# Patient Record
Sex: MALE | Race: WHITE | NOT HISPANIC OR LATINO | Employment: OTHER | URBAN - METROPOLITAN AREA
[De-identification: names, ages, dates, MRNs, and addresses within clinical notes are randomized per-mention and may not be internally consistent; named-entity substitution may affect disease eponyms.]

---

## 2017-03-30 DIAGNOSIS — K70.31 ALCOHOLIC CIRRHOSIS OF LIVER WITH ASCITES (H): Primary | ICD-10-CM

## 2017-04-10 ENCOUNTER — OFFICE VISIT (OUTPATIENT)
Dept: GASTROENTEROLOGY | Facility: CLINIC | Age: 52
End: 2017-04-10
Attending: INTERNAL MEDICINE
Payer: COMMERCIAL

## 2017-04-10 VITALS
TEMPERATURE: 98 F | HEART RATE: 89 BPM | HEIGHT: 70 IN | OXYGEN SATURATION: 100 % | SYSTOLIC BLOOD PRESSURE: 156 MMHG | WEIGHT: 171.8 LBS | BODY MASS INDEX: 24.6 KG/M2 | DIASTOLIC BLOOD PRESSURE: 91 MMHG

## 2017-04-10 DIAGNOSIS — K70.31 ALCOHOLIC CIRRHOSIS OF LIVER WITH ASCITES (H): ICD-10-CM

## 2017-04-10 DIAGNOSIS — K70.30 ALCOHOLIC CIRRHOSIS OF LIVER WITHOUT ASCITES (H): Primary | ICD-10-CM

## 2017-04-10 LAB
ALBUMIN SERPL-MCNC: 3.2 G/DL (ref 3.4–5)
ALP SERPL-CCNC: 245 U/L (ref 40–150)
ALT SERPL W P-5'-P-CCNC: 19 U/L (ref 0–70)
ANION GAP SERPL CALCULATED.3IONS-SCNC: 8 MMOL/L (ref 3–14)
AST SERPL W P-5'-P-CCNC: 48 U/L (ref 0–45)
BILIRUB DIRECT SERPL-MCNC: 0.9 MG/DL (ref 0–0.2)
BILIRUB SERPL-MCNC: 1.9 MG/DL (ref 0.2–1.3)
BUN SERPL-MCNC: 7 MG/DL (ref 7–30)
CALCIUM SERPL-MCNC: 9 MG/DL (ref 8.5–10.1)
CHLORIDE SERPL-SCNC: 109 MMOL/L (ref 94–109)
CO2 SERPL-SCNC: 23 MMOL/L (ref 20–32)
CREAT SERPL-MCNC: 0.92 MG/DL (ref 0.66–1.25)
ERYTHROCYTE [DISTWIDTH] IN BLOOD BY AUTOMATED COUNT: 15.9 % (ref 10–15)
GFR SERPL CREATININE-BSD FRML MDRD: 86 ML/MIN/1.7M2
GLUCOSE SERPL-MCNC: 116 MG/DL (ref 70–99)
HCT VFR BLD AUTO: 39.3 % (ref 40–53)
HGB BLD-MCNC: 13.2 G/DL (ref 13.3–17.7)
INR PPP: 1.4 (ref 0.86–1.14)
MCH RBC QN AUTO: 31.7 PG (ref 26.5–33)
MCHC RBC AUTO-ENTMCNC: 33.6 G/DL (ref 31.5–36.5)
MCV RBC AUTO: 95 FL (ref 78–100)
PLATELET # BLD AUTO: 66 10E9/L (ref 150–450)
POTASSIUM SERPL-SCNC: 3.5 MMOL/L (ref 3.4–5.3)
PROT SERPL-MCNC: 7.6 G/DL (ref 6.8–8.8)
RBC # BLD AUTO: 4.16 10E12/L (ref 4.4–5.9)
SODIUM SERPL-SCNC: 140 MMOL/L (ref 133–144)
WBC # BLD AUTO: 4.3 10E9/L (ref 4–11)

## 2017-04-10 PROCEDURE — 36415 COLL VENOUS BLD VENIPUNCTURE: CPT | Performed by: INTERNAL MEDICINE

## 2017-04-10 PROCEDURE — 99212 OFFICE O/P EST SF 10 MIN: CPT | Mod: ZF

## 2017-04-10 PROCEDURE — 80048 BASIC METABOLIC PNL TOTAL CA: CPT | Performed by: INTERNAL MEDICINE

## 2017-04-10 PROCEDURE — 85027 COMPLETE CBC AUTOMATED: CPT | Performed by: INTERNAL MEDICINE

## 2017-04-10 PROCEDURE — 80076 HEPATIC FUNCTION PANEL: CPT | Performed by: INTERNAL MEDICINE

## 2017-04-10 PROCEDURE — 85610 PROTHROMBIN TIME: CPT | Performed by: INTERNAL MEDICINE

## 2017-04-10 ASSESSMENT — PAIN SCALES - GENERAL: PAINLEVEL: NO PAIN (0)

## 2017-04-10 NOTE — NURSING NOTE
Chief Complaint   Patient presents with     RECHECK     Alcoholic Cirrhosis of Liver with ascites   Pt roomed, vitals, meds, and allergies reviewed with pt. Pt ready for provider.  Dalton Arriaga, CMA

## 2017-04-10 NOTE — MR AVS SNAPSHOT
After Visit Summary   4/10/2017    Ivan Bell    MRN: [id number]           Patient Information     Date Of Birth          1965        Visit Information        Provider Department      4/10/2017 9:00 AM Gonzalo Wahl MD [facility] Hepatology        Care Instructions    1.  Stop spironolactone/Aldactone  2.  Low salt diet  3.  Weight yourself every week- let us know if your weight increases or if you develop fluid retention in your legs  4.  No alcohol  5.  See me in the clinic in 6 months    Gonzalo Bales MD  Hepatology  [facility]        Follow-ups after your visit        Follow-up notes from your care team     Return in about 6 months (around 10/10/2017).      Your next 10 appointments already scheduled     Oct 09, 2017  7:00 AM CDT   Lab with  LAB   [facility])    [address] [place] [id number] [phone]            Oct 09, 2017  8:00 AM CDT   (Arrive by 7:45 AM)   Return General Liver with Gonzalo Miramontes MD   [facility] Hepatology ([facility])    [address] [place] [address] [phone]              Who to contact     If you have questions or need follow up information about today's clinic visit or your schedule please contact [facility] HEPATOLOGY directly at [phone].  Normal or non-critical lab and imaging results will be communicated to you by MyChart, letter or phone within 4 business days after the clinic has received the results. If you do not hear from us within 7 days, please contact the clinic through MyChart or phone. If you have a critical or abnormal lab result, we will notify you by phone as soon as possible.  Submit refill requests through [employer] or call your pharmacy and they will forward the refill request to us. Please allow 3 business days for your refill to be completed.          Additional Information About Your  "Visit        MyChart Information     [employer] gives you secure access to your electronic health record. If you see a primary care provider, you can also send messages to your care team and make appointments. If you have questions, please call your primary care clinic.  If you do not have a primary care provider, please call [phone] and they will assist you.        Care EveryWhere ID     This is your Care EveryWhere ID. This could be used by other organizations to access your [place] medical records  [id number]        Your Vitals Were     Pulse Temperature Height Pulse Oximetry BMI (Body Mass Index)       89 98  F (36.7  C) (Oral) 1.778 m (5' 10\") 100% 24.65 kg/m2        Blood Pressure from Last 3 Encounters:   04/10/17 (!) 156/91   10/03/16 140/70   10/03/16 135/79    Weight from Last 3 Encounters:   04/10/17 77.9 kg (171 lb 12.8 oz)   10/03/16 76.9 kg (169 lb 8 oz)   10/03/16 76.6 kg (168 lb 12.8 oz)              Today, you had the following     No orders found for display       Primary Care Provider Office Phone # Fax #    Rosette Wills [phone]       [place] [facility] [address] [place] [address]        Thank you!     Thank you for choosing [facility] HEPATOLOGY  for your care. Our goal is always to provide you with excellent care. Hearing back from our patients is one way we can continue to improve our services. Please take a few minutes to complete the written survey that you may receive in the mail after your visit with us. Thank you!             Your Updated Medication List - Protect others around you: Learn how to safely use, store and throw away your medicines at www.disposemymeds.org.          This list is accurate as of: 4/10/17  9:21 AM.  Always use your most recent med list.                   Brand Name Dispense Instructions for use    MULTIPLE VITAMINS PO      Take 1 tablet by mouth daily       sildenafil 20 MG tablet    REVATIO/VIAGRA    90 tablet    Take 1 " tablet (20 mg) by mouth 3 times daily Take 1-3 po daily prn       triamcinolone 0.1 % cream    KENALOG    80 g    Apply sparingly to affected area three times daily as needed for itching.       UNABLE TO FIND      MEDICATION NAME: P6

## 2017-04-10 NOTE — PATIENT INSTRUCTIONS
1.  Stop spironolactone/Aldactone  2.  Low salt diet  3.  Weight yourself every week- let us know if your weight increases or if you develop fluid retention in your legs  4.  No alcohol  5.  See me in the clinic in 6 months    Gonzalo Bales MD  Hepatology  [facility]

## 2017-04-10 NOTE — LETTER
"4/10/2017      RE: Ivan Bell  [address] [address] [address]       [facility]    Hepatology follow-up    Follow-up visit for cirrhosis    Subjective:  51 year old male    Cirrhosis  - ETOH, last ETOH 2/14/16  - hx ascites, HE  - no hx variceal bleed  - last EGD March 2016- small EV, mod portal hypertensive gastropathy  - HCC screening- abd U/S 4/10/2017 (P)    Patient presents to clinic this morning with his wife for follow-up of cirrhosis.  Last clinic visit was Oct 2016.  Patient denies ER visits, new medications or hospital admissions since his last clinic visit.    Patient is doing well.  His appetite has improved.  He feels well with improved energy.  He reports that his mood is good but his wife thinks that he is still martinez/irritable.  The clinic  and behavioral health clinician did reach out about counseling but the patient never returned their calls.  He is still concerned about erectile dysfunction.  Patient denies any symptoms specific to liver disease.    Patient denies jaundice, lower extremity edema, abdominal distension, lethargy or confusion.    Patient denies melena, hematemesis or hematochezia.    Patient denies fevers, sweats or chills.  Weight stable.    Patient reports that he \"occasionally\" has a beer.  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  He and his wife are still raising a [place] Short-hair puppy who is now 6 months old.      Medical hx Surgical hx   Past Medical History:   Diagnosis Date     Hypertension      Left calcaneus fracture 1/9/2006      Past Surgical History:   Procedure Laterality Date     ANKLE SURGERY Left      COLONOSCOPY N/A 3/31/2016    Procedure: COLONOSCOPY;  Surgeon: Rhys Uriostegui MD;  Location:  GI     ESOPHAGOSCOPY, GASTROSCOPY, DUODENOSCOPY (EGD), COMBINED N/A 3/31/2016    Procedure: COMBINED ESOPHAGOSCOPY, GASTROSCOPY, DUODENOSCOPY (EGD);  Surgeon: " "Rhys Uriostegui MD;  Location:  GI     KNEE SURGERY Left      KNEE SURGERY Right           Medications  Prior to Admission medications    Medication Sig Start Date End Date Taking? Authorizing Provider   UNABLE TO FIND MEDICATION NAME: P6   Yes Reported, Patient   sildenafil (REVATIO/VIAGRA) 20 MG tablet Take 1 tablet (20 mg) by mouth 3 times daily Take 1-3 po daily prn 10/3/16  Yes Rosette Wills MD   triamcinolone (KENALOG) 0.1 % cream Apply sparingly to affected area three times daily as needed for itching. 10/3/16  Yes Rosette Wills MD   spironolactone (ALDACTONE) 100 MG tablet TAKE ONE TABLET BY MOUTH EVERY DAY  Patient taking differently: TAKE ONE 1/2 TABLET BY MOUTH EVERY DAY 8/2/16  Yes Gonzalo Wahl MD   MULTIPLE VITAMINS PO Take 1 tablet by mouth daily   Yes Reported, Patient       Allergies  Allergies   Allergen Reactions     Benadryl [Diphenhydramine] Other (See Comments)     Delirium (visual and auditory hallucinations)       Review of systems  A 10-point review of systems was negative    Examination  BP (!) 156/91  Pulse 89  Temp 98  F (36.7  C) (Oral)  Ht 1.778 m (5' 10\")  Wt 77.9 kg (171 lb 12.8 oz)  SpO2 100%  BMI 24.65 kg/m2  Body mass index is 24.65 kg/(m^2).    Gen- well, NAD, A+Ox3, normal color  Lym- no palpable LAD  CVS- RRR  RS- CTA  Abd- soft, non-tender, no ascites or organomegaly on palpation or percussion, BS+  Extr-hands normal, no BESSY  Skin- no rash or jaundice  Neuro- no asterixis  Psych- normal mood    Laboratory  Lab Results   Component Value Date     04/10/2017    POTASSIUM 3.5 04/10/2017    CHLORIDE 109 04/10/2017    CO2 23 04/10/2017    BUN 7 04/10/2017    CR 0.92 04/10/2017       Lab Results   Component Value Date    BILITOTAL 1.9 04/10/2017    ALT 19 04/10/2017    AST 48 04/10/2017    ALKPHOS 245 04/10/2017       Lab Results   Component Value Date    ALBUMIN 3.2 04/10/2017    PROTTOTAL 7.6 04/10/2017        Lab Results   Component " Value Date    WBC 4.3 04/10/2017    HGB 13.2 04/10/2017    MCV 95 04/10/2017    PLT 66 04/10/2017       Lab Results   Component Value Date    INR 1.40 04/10/2017       Radiology  abd U/S (P)    Assessment  51 year old male who presents for routine follow-up of alcoholic cirrhosis complicated with history of ascites.  MELD-Na= 13.  Last ETOH= ?.  Ascites remains well-controlled on low dose diuretics.  Will discontinue diuretics.  We discussed the importance of complete abstinence from alcohol in patients with alcoholic cirrhosis in order to avoid worsening liver dysfunction which would ultimately require evaluation for liver transplant.  Up to date with surveillance of esophageal varices.  Up to date with HCC screening    Plan  1.  Follow-up pending U/S results  2.  Discontinue diuretics if no evidence of ascites on U/S  3.  Complete abstinence from alcohol  4.  Low Na diet  5.  Follow-up in 6 months    Gonzalo Bales MD  Hepatology  [facility]

## 2017-04-10 NOTE — PROGRESS NOTES
"[facility]    Hepatology follow-up    Follow-up visit for cirrhosis    Subjective:  51 year old male    Cirrhosis  - ETOH, last ETOH 2/14/16  - hx ascites, HE  - no hx variceal bleed  - last EGD March 2016- small EV, mod portal hypertensive gastropathy  - HCC screening- abd U/S 4/10/2017 (P)    Patient presents to clinic this morning with his wife for follow-up of cirrhosis.  Last clinic visit was Oct 2016.  Patient denies ER visits, new medications or hospital admissions since his last clinic visit.    Patient is doing well.  His appetite has improved.  He feels well with improved energy.  He reports that his mood is good but his wife thinks that he is still martinez/irritable.  The clinic  and behavioral health clinician did reach out about counseling but the patient never returned their calls.  He is still concerned about erectile dysfunction.  Patient denies any symptoms specific to liver disease.    Patient denies jaundice, lower extremity edema, abdominal distension, lethargy or confusion.    Patient denies melena, hematemesis or hematochezia.    Patient denies fevers, sweats or chills.  Weight stable.    Patient reports that he \"occasionally\" has a beer.  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      Medical hx Surgical hx   Past Medical History:   Diagnosis Date     Hypertension      Left calcaneus fracture 1/9/2006      Past Surgical History:   Procedure Laterality Date     ANKLE SURGERY Left      COLONOSCOPY N/A 3/31/2016    Procedure: COLONOSCOPY;  Surgeon: Rhys Uriostegui MD;  Location:  GI     ESOPHAGOSCOPY, GASTROSCOPY, DUODENOSCOPY (EGD), COMBINED N/A 3/31/2016    Procedure: COMBINED ESOPHAGOSCOPY, GASTROSCOPY, DUODENOSCOPY (EGD);  Surgeon: Rhys Uriostegui MD;  Location:  GI     KNEE SURGERY Left      KNEE " "SURGERY Right           Medications  Prior to Admission medications    Medication Sig Start Date End Date Taking? Authorizing Provider   UNABLE TO FIND MEDICATION NAME: P6   Yes Reported, Patient   sildenafil (REVATIO/VIAGRA) 20 MG tablet Take 1 tablet (20 mg) by mouth 3 times daily Take 1-3 po daily prn 10/3/16  Yes Rosette Wills MD   triamcinolone (KENALOG) 0.1 % cream Apply sparingly to affected area three times daily as needed for itching. 10/3/16  Yes Rosette Wills MD   spironolactone (ALDACTONE) 100 MG tablet TAKE ONE TABLET BY MOUTH EVERY DAY  Patient taking differently: TAKE ONE 1/2 TABLET BY MOUTH EVERY DAY 8/2/16  Yes Gonzalo Wahl MD   MULTIPLE VITAMINS PO Take 1 tablet by mouth daily   Yes Reported, Patient       Allergies  Allergies   Allergen Reactions     Benadryl [Diphenhydramine] Other (See Comments)     Delirium (visual and auditory hallucinations)       Review of systems  A 10-point review of systems was negative    Examination  BP (!) 156/91  Pulse 89  Temp 98  F (36.7  C) (Oral)  Ht 1.778 m (5' 10\")  Wt 77.9 kg (171 lb 12.8 oz)  SpO2 100%  BMI 24.65 kg/m2  Body mass index is 24.65 kg/(m^2).    Gen- well, NAD, A+Ox3, normal color  Lym- no palpable LAD  CVS- RRR  RS- CTA  Abd- soft, non-tender, no ascites or organomegaly on palpation or percussion, BS+  Extr-hands normal, no BESSY  Skin- no rash or jaundice  Neuro- no asterixis  Psych- normal mood    Laboratory  Lab Results   Component Value Date     04/10/2017    POTASSIUM 3.5 04/10/2017    CHLORIDE 109 04/10/2017    CO2 23 04/10/2017    BUN 7 04/10/2017    CR 0.92 04/10/2017       Lab Results   Component Value Date    BILITOTAL 1.9 04/10/2017    ALT 19 04/10/2017    AST 48 04/10/2017    ALKPHOS 245 04/10/2017       Lab Results   Component Value Date    ALBUMIN 3.2 04/10/2017    PROTTOTAL 7.6 04/10/2017        Lab Results   Component Value Date    WBC 4.3 04/10/2017    HGB 13.2 04/10/2017    MCV 95 04/10/2017    PLT " 66 04/10/2017       Lab Results   Component Value Date    INR 1.40 04/10/2017       Radiology  abd U/S (P)    Assessment  51 year old male who presents for routine follow-up of alcoholic cirrhosis complicated with history of ascites.  MELD-Na= 13.  Last ETOH= ?.  Ascites remains well-controlled on low dose diuretics.  Will discontinue diuretics.  We discussed the importance of complete abstinence from alcohol in patients with alcoholic cirrhosis in order to avoid worsening liver dysfunction which would ultimately require evaluation for liver transplant.  Up to date with surveillance of esophageal varices.  Up to date with HCC screening    Plan  1.  Follow-up pending U/S results  2.  Discontinue diuretics if no evidence of ascites on U/S  3.  Complete abstinence from alcohol  4.  Low Na diet  5.  Follow-up in 6 months    Gonzalo Bales MD  Hepatology  [facility]

## 2017-04-11 DIAGNOSIS — N52.9 ERECTILE DYSFUNCTION, UNSPECIFIED ERECTILE DYSFUNCTION TYPE: ICD-10-CM

## 2017-04-11 RX ORDER — SILDENAFIL CITRATE 20 MG/1
20 TABLET ORAL 3 TIMES DAILY
Qty: 90 TABLET | Refills: 3 | Status: ON HOLD | OUTPATIENT
Start: 2017-04-11 | End: 2017-10-31

## 2017-04-11 NOTE — TELEPHONE ENCOUNTER
Routing refill request to provider for review/approval because:  Drug not on the [facility] refill protocol or controlled substance  PDevyn Fernandez  Clinic  RN/[facility]

## 2017-04-11 NOTE — TELEPHONE ENCOUNTER
Sildenafil      Last Written Prescription Date: 10/3/2016  Last Fill Quantity: 90,  # refills: 0   Last Office Visit with [facility], [facility] or [facility] prescribing provider: 10/3/2016

## 2017-09-18 DIAGNOSIS — K70.31 ALCOHOLIC CIRRHOSIS OF LIVER WITH ASCITES (H): Primary | ICD-10-CM

## 2017-10-06 DIAGNOSIS — K70.31 ALCOHOLIC CIRRHOSIS OF LIVER WITH ASCITES (H): ICD-10-CM

## 2017-10-06 RX ORDER — SPIRONOLACTONE 100 MG/1
TABLET, FILM COATED ORAL
Qty: 30 TABLET | Refills: 3 | OUTPATIENT
Start: 2017-10-06

## 2017-10-11 ENCOUNTER — TELEPHONE (OUTPATIENT)
Dept: FAMILY MEDICINE | Facility: CLINIC | Age: 52
End: 2017-10-11

## 2017-10-11 ENCOUNTER — OFFICE VISIT (OUTPATIENT)
Dept: FAMILY MEDICINE | Facility: CLINIC | Age: 52
End: 2017-10-11
Payer: COMMERCIAL

## 2017-10-11 VITALS
HEIGHT: 70 IN | BODY MASS INDEX: 24.62 KG/M2 | TEMPERATURE: 96.9 F | HEART RATE: 107 BPM | SYSTOLIC BLOOD PRESSURE: 139 MMHG | DIASTOLIC BLOOD PRESSURE: 85 MMHG | WEIGHT: 172 LBS

## 2017-10-11 DIAGNOSIS — K70.31 ASCITES DUE TO ALCOHOLIC CIRRHOSIS (H): ICD-10-CM

## 2017-10-11 DIAGNOSIS — K70.31 ALCOHOLIC CIRRHOSIS OF LIVER WITH ASCITES (H): Primary | ICD-10-CM

## 2017-10-11 LAB
ALBUMIN SERPL-MCNC: 2.8 G/DL (ref 3.4–5)
ALP SERPL-CCNC: 330 U/L (ref 40–150)
ALT SERPL W P-5'-P-CCNC: 21 U/L (ref 0–70)
ANION GAP SERPL CALCULATED.3IONS-SCNC: 8 MMOL/L (ref 3–14)
AST SERPL W P-5'-P-CCNC: 46 U/L (ref 0–45)
BASOPHILS # BLD AUTO: 0 10E9/L (ref 0–0.2)
BASOPHILS NFR BLD AUTO: 0.4 %
BILIRUB DIRECT SERPL-MCNC: 1.5 MG/DL (ref 0–0.2)
BILIRUB SERPL-MCNC: 2.9 MG/DL (ref 0.2–1.3)
BUN SERPL-MCNC: 12 MG/DL (ref 7–30)
CALCIUM SERPL-MCNC: 8.5 MG/DL (ref 8.5–10.1)
CHLORIDE SERPL-SCNC: 107 MMOL/L (ref 94–109)
CO2 SERPL-SCNC: 21 MMOL/L (ref 20–32)
CREAT SERPL-MCNC: 0.94 MG/DL (ref 0.66–1.25)
DIFFERENTIAL METHOD BLD: ABNORMAL
EOSINOPHIL # BLD AUTO: 0.4 10E9/L (ref 0–0.7)
EOSINOPHIL NFR BLD AUTO: 7.1 %
ERYTHROCYTE [DISTWIDTH] IN BLOOD BY AUTOMATED COUNT: 16.6 % (ref 10–15)
GFR SERPL CREATININE-BSD FRML MDRD: 83 ML/MIN/1.7M2
GLUCOSE SERPL-MCNC: 101 MG/DL (ref 70–99)
HCT VFR BLD AUTO: 29.3 % (ref 40–53)
HGB BLD-MCNC: 9.3 G/DL (ref 13.3–17.7)
LYMPHOCYTES # BLD AUTO: 0.8 10E9/L (ref 0.8–5.3)
LYMPHOCYTES NFR BLD AUTO: 15.1 %
MCH RBC QN AUTO: 30 PG (ref 26.5–33)
MCHC RBC AUTO-ENTMCNC: 31.7 G/DL (ref 31.5–36.5)
MCV RBC AUTO: 95 FL (ref 78–100)
MONOCYTES # BLD AUTO: 0.6 10E9/L (ref 0–1.3)
MONOCYTES NFR BLD AUTO: 12 %
NEUTROPHILS # BLD AUTO: 3.5 10E9/L (ref 1.6–8.3)
NEUTROPHILS NFR BLD AUTO: 65.4 %
PLATELET # BLD AUTO: 130 10E9/L (ref 150–450)
POTASSIUM SERPL-SCNC: 4.2 MMOL/L (ref 3.4–5.3)
PROT SERPL-MCNC: 7.2 G/DL (ref 6.8–8.8)
RBC # BLD AUTO: 3.1 10E12/L (ref 4.4–5.9)
SODIUM SERPL-SCNC: 136 MMOL/L (ref 133–144)
WBC # BLD AUTO: 5.4 10E9/L (ref 4–11)

## 2017-10-11 PROCEDURE — 99214 OFFICE O/P EST MOD 30 MIN: CPT | Performed by: FAMILY MEDICINE

## 2017-10-11 PROCEDURE — 80048 BASIC METABOLIC PNL TOTAL CA: CPT | Performed by: FAMILY MEDICINE

## 2017-10-11 PROCEDURE — 85025 COMPLETE CBC W/AUTO DIFF WBC: CPT | Performed by: FAMILY MEDICINE

## 2017-10-11 PROCEDURE — 80076 HEPATIC FUNCTION PANEL: CPT | Performed by: FAMILY MEDICINE

## 2017-10-11 PROCEDURE — 36415 COLL VENOUS BLD VENIPUNCTURE: CPT | Performed by: FAMILY MEDICINE

## 2017-10-11 RX ORDER — SPIRONOLACTONE 100 MG/1
100 TABLET, FILM COATED ORAL DAILY
Qty: 30 TABLET | Refills: 3 | Status: ON HOLD | OUTPATIENT
Start: 2017-10-11 | End: 2017-10-31

## 2017-10-11 RX ORDER — SPIRONOLACTONE 100 MG/1
100 TABLET, FILM COATED ORAL DAILY
Qty: 30 TABLET | Refills: 3 | Status: CANCELLED | OUTPATIENT
Start: 2017-10-11

## 2017-10-11 NOTE — TELEPHONE ENCOUNTER
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   Sounded like they are in the car, on the way here.   Edie Clark RNC      "

## 2017-10-11 NOTE — MR AVS SNAPSHOT
After Visit Summary   10/11/2017    Ivan Bell    MRN: [id number]           Patient Information     Date Of Birth          1965        Visit Information        Provider Department      10/11/2017 8:00 AM Gabriella Ramirez MD [facility]        Today's Diagnoses     Alcoholic cirrhosis of liver with ascites (H)          Care Instructions          Thank you for choosing [facility].  You may be receiving a survey in the mail from [facility] your visit today.  Please take a few minutes to complete and return the survey to let us know how we are doing.      If you have questions or concerns, please contact us via [employer] or you can contact your care team at [phone].    Our Clinic hours are:  Monday 6:40 am  to 7:00 pm  Tuesday -Friday 6:40 am to 5:00 pm    The [place] outpatient lab hours are:  Monday - Friday 6:10 am to 4:45 pm  Saturdays 7:00 am to 11:00 am  Appointments are required, call [phone]    If you have clinical questions after hours or would like to schedule an appointment,  call the clinic at [phone].  Cirrhosis    The liver is found on the right side of your belly (abdomen). It is just below the rib cage. The liver has many important jobs. It removes toxins from the blood. It also helps your blood clot to stop bleeding. Cirrhosis happens when the liver is scarred or injured. This damage is permanent. It can cause your liver to stop working (liver failure).   The two most common causes of cirrhosis are long-term heavy alcohol use and having hepatitis B or C. Other things that can damage the liver include toxins, certain medicines, and certain viruses.  Common symptoms of cirrhosis include:    Tiredness or weakness    Loss of appetite    Nausea and vomiting    Easy bleeding and bruising    Swelling of the belly (abdomen)    Weight loss    Yellowing of the eyes or skin (jaundice)    Itching    Confusion  Treatment helps ease symptoms  and prevent more liver damage. You may also get treatment to fight the hepatitis virus. Quitting alcohol will help slow down the disease getting worse. It may also prevent more complications. If cirrhosis gets worse and becomes life threatening, you may need a liver transplant.   Home care    Don't take medicines that can make liver damage worse. Your healthcare provider will tell you if any of the medicines you now take need to be changed. Talk with your provider before taking any medicine not prescribed. These include dietary supplements and herbs. Some of these may make liver damage worse.    Talk with your healthcare provider about medicines that have acetaminophen or NSAIDs such as ibuprofen and naproxen. These can also harm your liver.     Stop drinking alcohol. If you find it hard to stop drinking, seek professional help. Consider joining Alcoholics Anonymous or another type of treatment program for support.    If you use IV drugs, you are at high risk for hepatitis B and C. Seek help to stop.     Be sure to ask your healthcare provider about recommended vaccines. These include vaccines for viruses that can cause liver disease.  Follow-up care  Follow up with your healthcare provider or as advised by our staff.  For more information and to learn about support groups for people with liver disease, contact:    American Liver Foundation, www.liverfoundation.org, 165.716.3160    Hepatitis Foundation International, www.hepfi.org, 139.495.6824  When to seek medical advice  Call your healthcare provider right away if you have any of the following:    Rapid weight gain with increased size of your belly (abdomen) or leg swelling    Yellow color of your skin or eyes (jaundice) gets worse    Excess bleeding from cuts or injuries  Date Last Reviewed: 6/22/2015 2000-2017 [employer]. [address] [place] [address], [place] [address]. All rights reserved. This information is not intended as a substitute for  professional medical care. Always follow your healthcare professional's instructions.                Follow-ups after your visit        Your next 10 appointments already scheduled     Oct 12, 2017 10:45 AM CDT   US PARACENTESIS with YOSVANY MILLIGAN IMAGING NURSE, [place] [facility]   [facility] [place] [facility] ([facility])    [address] [place] [address]   [phone]           Tell us in advance if there s any chance you may be pregnant.  Bring a list of your medicines to the exam. Include vitamins, minerals and over-the-counter drugs.  If you take blood thinners, you may need to stop taking them a few days before treatment. Talk to your doctor before stopping these medicines. You will need a blood test the morning of your exam.   Stop taking Coumadin (warfarin) 3 days before your exam. Restart the day after your exam.   If you take aspirin, you may need to stop taking it 3 days before your scan.   If you take Plavix, Ticlid, Pletal or Persantine, you may need to stop taking them 5 days before your scan. Please talk to your doctor before stopping these medicines.  IF YOU WILL RECEIVE SEDATION (medicine to help you relax):   See your family doctor for an exam within 30 days of treatment.   Plan for an adult to drive you home and stay with you for at least 6 hours.   Follow the eating and drinking guidelines checked below:   No eating or drinking for 4 hours before your test. You may take medicine with small sips of water.   If you have diabetes:If you take insulin, call your diabetes care team. Do not take diabetes pills on the morning of your test. If you take metformin (Avandamet, Glucophage, Glucovance, Metaglip) and received contrast, wait 48 hours before re-starting this medicine.  Please call the Imaging Department at your exam site with any questions.            Nov 28, 2017  7:45 AM CST   Lab with  LAB    Health Lab ([facility])    [address]  "Floor  [facility] [id number] [phone]            Nov 28, 2017  8:40 AM CST   (Arrive by 8:25 AM)   Return General Liver with Gonzalo Miramnotes MD   [facility] ([facility])    [address] [facility]  3rd [facility] [id number] [phone]              Future tests that were ordered for you today     Open Future Orders        Priority Expected Expires Ordered    US Paracentesis Routine  10/11/2018 10/11/2017    US Abdomen Complete Routine  10/11/2018 10/11/2017            Who to contact     If you have questions or need follow up information about today's clinic visit or your schedule please contact [facility] directly at [phone].  Normal or non-critical lab and imaging results will be communicated to you by MyChart, letter or phone within 4 business days after the clinic has received the results. If you do not hear from us within 7 days, please contact the clinic through MyChart or phone. If you have a critical or abnormal lab result, we will notify you by phone as soon as possible.  Submit refill requests through [employer] or call your pharmacy and they will forward the refill request to us. Please allow 3 business days for your refill to be completed.          Additional Information About Your Visit        [employer] Information     [employer] gives you secure access to your electronic health record. If you see a primary care provider, you can also send messages to your care team and make appointments. If you have questions, please call your primary care clinic.  If you do not have a primary care provider, please call [phone] and they will assist you.        Care EveryWhere ID     This is your Care EveryWhere ID. This could be used by other organizations to access your [facility] medical records  [id number]        Your Vitals Were     Pulse Temperature Height BMI (Body Mass Index)          107 96.9  F (36.1  C) (Tympanic) 5' 10\" (1.778 m) " 24.68 kg/m2         Blood Pressure from Last 3 Encounters:   10/11/17 139/85   04/10/17 (!) 156/91   10/03/16 140/70    Weight from Last 3 Encounters:   10/11/17 172 lb (78 kg)   04/10/17 171 lb 12.8 oz (77.9 kg)   10/03/16 169 lb 8 oz (76.9 kg)              We Performed the Following     Basic metabolic panel     CBC with platelets differential     Hepatic panel          Today's Medication Changes          These changes are accurate as of: 10/11/17  9:08 AM.  If you have any questions, ask your nurse or doctor.               Start taking these medicines.        Dose/Directions    spironolactone 100 MG tablet   Commonly known as:  ALDACTONE   Used for:  Alcoholic cirrhosis of liver with ascites (H)   Started by:  Gabriella Ramirez MD        Dose:  100 mg   Take 1 tablet (100 mg) by mouth daily   Quantity:  30 tablet   Refills:  3            Where to get your medicines      These medications were sent to [facility] [address] [address]     Phone:  [phone]     spironolactone 100 MG tablet                Primary Care Provider Office Phone # Fax #    Rosette Wills [phone]       [address] [place] [address]        Equal Access to Services     KAITLYN CAMACHO AH: Hadii linnea ku hadasho Soomaali, waaxda luqadaha, qaybta kaalmada adeegyada, jenny roe hayberenicen han molina. So [facility] [phone].    ATENCIÓN: Si habla español, tiene a lee disposición servicios gratuitos de asistencia lingüística. Llame al [phone].    We comply with applicable federal civil rights laws and [place] laws. We do not discriminate on the basis of race, color, national origin, age, disability, sex, sexual orientation, or gender identity.            Thank you!     Thank you for choosing [facility]  for your care. Our goal is always to provide you with excellent care. Hearing back from our patients is one  way we can continue to improve our services. Please take a few minutes to complete the written survey that you may receive in the mail after your visit with us. Thank you!             Your Updated Medication List - Protect others around you: Learn how to safely use, store and throw away your medicines at www.disposemymeds.org.          This list is accurate as of: 10/11/17  9:08 AM.  Always use your most recent med list.                   Brand Name Dispense Instructions for use Diagnosis    MULTIPLE VITAMINS PO      Take 1 tablet by mouth daily        sildenafil 20 MG tablet    REVATIO    90 tablet    Take 1 tablet (20 mg) by mouth 3 times daily Take 1-3 po daily prn    Erectile dysfunction, unspecified erectile dysfunction type       spironolactone 100 MG tablet    ALDACTONE    30 tablet    Take 1 tablet (100 mg) by mouth daily    Alcoholic cirrhosis of liver with ascites (H)       triamcinolone 0.1 % cream    KENALOG    80 g    Apply sparingly to affected area three times daily as needed for itching.    Rash       UNABLE TO FIND      MEDICATION NAME: P6

## 2017-10-11 NOTE — PATIENT INSTRUCTIONS
Thank you for choosing [facility].  You may be receiving a survey in the mail from [employer] regarding your visit today.  Please take a few minutes to complete and return the survey to let us know how we are doing.      If you have questions or concerns, please contact us via [employer] or you can contact your care team at [phone].    Our Clinic hours are:  Monday 6:40 am  to 7:00 pm  Tuesday -Friday 6:40 am to 5:00 pm    The [place] outpatient lab hours are:  Monday - Friday 6:10 am to 4:45 pm  Saturdays 7:00 am to 11:00 am  Appointments are required, call [phone]    If you have clinical questions after hours or would like to schedule an appointment,  call the clinic at [phone].  Cirrhosis    The liver is found on the right side of your belly (abdomen). It is just below the rib cage. The liver has many important jobs. It removes toxins from the blood. It also helps your blood clot to stop bleeding. Cirrhosis happens when the liver is scarred or injured. This damage is permanent. It can cause your liver to stop working (liver failure).   The two most common causes of cirrhosis are long-term heavy alcohol use and having hepatitis B or C. Other things that can damage the liver include toxins, certain medicines, and certain viruses.  Common symptoms of cirrhosis include:    Tiredness or weakness    Loss of appetite    Nausea and vomiting    Easy bleeding and bruising    Swelling of the belly (abdomen)    Weight loss    Yellowing of the eyes or skin (jaundice)    Itching    Confusion  Treatment helps ease symptoms and prevent more liver damage. You may also get treatment to fight the hepatitis virus. Quitting alcohol will help slow down the disease getting worse. It may also prevent more complications. If cirrhosis gets worse and becomes life threatening, you may need a liver transplant.   Home care    Don't take medicines that can make liver damage worse. Your healthcare provider will tell  you if any of the medicines you now take need to be changed. Talk with your provider before taking any medicine not prescribed. These include dietary supplements and herbs. Some of these may make liver damage worse.    Talk with your healthcare provider about medicines that have acetaminophen or NSAIDs such as ibuprofen and naproxen. These can also harm your liver.     Stop drinking alcohol. If you find it hard to stop drinking, seek professional help. Consider joining Alcoholics Anonymous or another type of treatment program for support.    If you use IV drugs, you are at high risk for hepatitis B and C. Seek help to stop.     Be sure to ask your healthcare provider about recommended vaccines. These include vaccines for viruses that can cause liver disease.  Follow-up care  Follow up with your healthcare provider or as advised by our staff.  For more information and to learn about support groups for people with liver disease, contact:    American Liver Foundation, www.liverfoundation.org, 700.406.3298    Hepatitis Foundation International, www.hepfi.org, 940.384.1269  When to seek medical advice  Call your healthcare provider right away if you have any of the following:    Rapid weight gain with increased size of your belly (abdomen) or leg swelling    Yellow color of your skin or eyes (jaundice) gets worse    Excess bleeding from cuts or injuries  Date Last Reviewed: 6/22/2015 2000-2017 [employer]. [address] [place] [address], [place] [address]. All rights reserved. This information is not intended as a substitute for professional medical care. Always follow your healthcare professional's instructions.

## 2017-10-11 NOTE — NURSING NOTE
"Chief Complaint   Patient presents with     Abdominal Pain     patient has fluid in abdominal area needing to be drained        Initial /85 (BP Location: Right arm, Cuff Size: Adult Regular)  Pulse 107  Temp 96.9  F (36.1  C) (Tympanic)  Ht 5' 10\" (1.778 m)  Wt 172 lb (78 kg)  BMI 24.68 kg/m2 Estimated body mass index is 24.68 kg/(m^2) as calculated from the following:    Height as of this encounter: 5' 10\" (1.778 m).    Weight as of this encounter: 172 lb (78 kg).  Medication Reconciliation: complete  "

## 2017-10-11 NOTE — PROGRESS NOTES
"  SUBJECTIVE:   Ivan Bell is a 52 year old male who presents to clinic today for the following health issues:      Concern - abdominal fluid   Onset: has been increasing over the past mo    Description:   Patient is obtaining abdominal fluid, it is getting difficult for him to breathe,   has had trouble in the past,    Intensity: moderate, severe    Progression of Symptoms:  Worsening over the past mo    Accompanying Signs & Symptoms:  Patient has not been taking the water pill    Previous history of similar problem:   Yes     Precipitating factors:   Worsened by: amount of fluid patient is having difficulty breathing     Alleviating factors:  Improved by: with fluid drained and maybe going back on the water pill    Therapies Tried and outcome: Patient does see Dr. Bales he is a liver specialist       Patient is a 52 yr old male here for complaint of abdominal distension. Patient has a history of alcohol cirrhosis and has had paracentesis in the past . He follows GI at the  of  . Last visit was in April of 2017 . He had been doing relatively well since then and has not needed any drainage from his abdomen. He reports that the last constanza there has been progression of fluid accumulating in his abdomen. He complained of pain and shortness of breath especially when he lays down at night . He reports that he is not drinking but has \" an occasional beer:\". No fevers or chills. He denies any jaundice.    Problem list and histories reviewed & adjusted, as indicated.  Additional history: as documented    Patient Active Problem List   Diagnosis     Hypertriglyceridemia     Gouty arthropathy     Erectile dysfunction     Injury, hand     CARDIOVASCULAR SCREENING; LDL GOAL LESS THAN 130     24 hour contact given to patient      Hypertension goal BP (blood pressure) < 140/90     Peroneal muscular atrophy     Hallucinations, visual     Alcoholic cirrhosis of liver with ascites (H)     Past Surgical History:   Procedure " Laterality Date     ANKLE SURGERY Left      COLONOSCOPY N/A 3/31/2016    Procedure: COLONOSCOPY;  Surgeon: Rhys Uriostegui MD;  Location:  GI     ESOPHAGOSCOPY, GASTROSCOPY, DUODENOSCOPY (EGD), COMBINED N/A 3/31/2016    Procedure: COMBINED ESOPHAGOSCOPY, GASTROSCOPY, DUODENOSCOPY (EGD);  Surgeon: Rhys Uriostegui MD;  Location:  GI     KNEE SURGERY Left      KNEE SURGERY Right        Social History   Substance Use Topics     Smoking status: Former Smoker     Types: Dip, chew, snus or snuff     Smokeless tobacco: Current User      Comment: 1 tin per 10 days.     Alcohol use No      Comment: last etoh 2/14/16     Family History   Problem Relation Age of Onset     Family History Negative Mother      Family History Negative Father      Hypertension Father      Breast Cancer Maternal Grandmother      Rheumatoid Arthritis Daughter      Depression Daughter      Cancer - colorectal No family hx of      Prostate Cancer No family hx of      Liver Disease No family hx of          Current Outpatient Prescriptions   Medication Sig Dispense Refill     spironolactone (ALDACTONE) 100 MG tablet Take 1 tablet (100 mg) by mouth daily 30 tablet 3     MULTIPLE VITAMINS PO Take 1 tablet by mouth daily       sildenafil (REVATIO/VIAGRA) 20 MG tablet Take 1 tablet (20 mg) by mouth 3 times daily Take 1-3 po daily prn 90 tablet 3     UNABLE TO FIND MEDICATION NAME: P6       triamcinolone (KENALOG) 0.1 % cream Apply sparingly to affected area three times daily as needed for itching. 80 g 3     Allergies   Allergen Reactions     Benadryl [Diphenhydramine] Other (See Comments)     Delirium (visual and auditory hallucinations)         Reviewed and updated as needed this visit by clinical staffTobacco  Allergies  Med Hx  Surg Hx  Fam Hx  Soc Hx      Reviewed and updated as needed this visit by Provider         ROS:  Constitutional, HEENT, cardiovascular, pulmonary, gi and gu systems are negative, except as  "otherwise noted.      OBJECTIVE:   /85 (BP Location: Right arm, Cuff Size: Adult Regular)  Pulse 107  Temp 96.9  F (36.1  C) (Tympanic)  Ht 5' 10\" (1.778 m)  Wt 172 lb (78 kg)  BMI 24.68 kg/m2  Body mass index is 24.68 kg/(m^2).  GENERAL: healthy, alert and no distress  EYES: Eyes grossly normal to inspection, PERRL and conjunctivae and sclerae normal  HENT: ear canals and TM's normal, nose and mouth without ulcers or lesions  NECK: no adenopathy, no asymmetry, masses, or scars and thyroid normal to palpation  RESP: lungs clear to auscultation - no rales, rhonchi or wheezes  CV: regular rate and rhythm, normal S1 S2, no S3 or S4, no murmur, click or rub, no peripheral edema and peripheral pulses strong  ABDOMEN: tenderness diffuse, bowel sounds normal and ascites  MS: no gross musculoskeletal defects noted, no edema  SKIN: no suspicious lesions or rashes  NEURO: Normal strength and tone, mentation intact and speech normal    Diagnostic Test Results:  Results for orders placed or performed in visit on 10/11/17 (from the past 24 hour(s))   CBC with platelets differential   Result Value Ref Range    WBC 5.4 4.0 - 11.0 10e9/L    RBC Count 3.10 (L) 4.4 - 5.9 10e12/L    Hemoglobin 9.3 (L) 13.3 - 17.7 g/dL    Hematocrit 29.3 (L) 40.0 - 53.0 %    MCV 95 78 - 100 fl    MCH 30.0 26.5 - 33.0 pg    MCHC 31.7 31.5 - 36.5 g/dL    RDW 16.6 (H) 10.0 - 15.0 %    Platelet Count 130 (L) 150 - 450 10e9/L    Diff Method Automated Method     % Neutrophils 65.4 %    % Lymphocytes 15.1 %    % Monocytes 12.0 %    % Eosinophils 7.1 %    % Basophils 0.4 %    Absolute Neutrophil 3.5 1.6 - 8.3 10e9/L    Absolute Lymphocytes 0.8 0.8 - 5.3 10e9/L    Absolute Monocytes 0.6 0.0 - 1.3 10e9/L    Absolute Eosinophils 0.4 0.0 - 0.7 10e9/L    Absolute Basophils 0.0 0.0 - 0.2 10e9/L       ASSESSMENT/PLAN:   (K70.31) Alcoholic cirrhosis of liver with ascites (H)  (primary encounter diagnosis)  Comment: Labs ordered, ordered US paracentesis. " This will be done tomorrow   Plan: CBC with platelets differential, Hepatic panel,        Basic metabolic panel, US Abdomen Complete, US         Paracentesis, spironolactone (ALDACTONE) 100 MG        tablet            (K70.31) Ascites due to alcoholic cirrhosis (H)  Comment: As above  Plan: Asked to refrain from alcohol. Restarted spironolactone     FUTURE APPOINTMENTS:       - Follow-up visit as needed.  Patient Instructions         Thank you for choosing [facility].  You may be receiving a survey in the mail from [employer] regarding your visit today.  Please take a few minutes to complete and return the survey to let us know how we are doing.      If you have questions or concerns, please contact us via [employer] or you can contact your care team at [phone].    Our Clinic hours are:  Monday 6:40 am  to 7:00 pm  Tuesday -Friday 6:40 am to 5:00 pm    The [place] outpatient lab hours are:  Monday - Friday 6:10 am to 4:45 pm  Saturdays 7:00 am to 11:00 am  Appointments are required, call [phone]    If you have clinical questions after hours or would like to schedule an appointment,  call the clinic at [phone].  Cirrhosis    The liver is found on the right side of your belly (abdomen). It is just below the rib cage. The liver has many important jobs. It removes toxins from the blood. It also helps your blood clot to stop bleeding. Cirrhosis happens when the liver is scarred or injured. This damage is permanent. It can cause your liver to stop working (liver failure).   The two most common causes of cirrhosis are long-term heavy alcohol use and having hepatitis B or C. Other things that can damage the liver include toxins, certain medicines, and certain viruses.  Common symptoms of cirrhosis include:    Tiredness or weakness    Loss of appetite    Nausea and vomiting    Easy bleeding and bruising    Swelling of the belly (abdomen)    Weight loss    Yellowing of the eyes or skin  (jaundice)    Itching    Confusion  Treatment helps ease symptoms and prevent more liver damage. You may also get treatment to fight the hepatitis virus. Quitting alcohol will help slow down the disease getting worse. It may also prevent more complications. If cirrhosis gets worse and becomes life threatening, you may need a liver transplant.   Home care    Don't take medicines that can make liver damage worse. Your healthcare provider will tell you if any of the medicines you now take need to be changed. Talk with your provider before taking any medicine not prescribed. These include dietary supplements and herbs. Some of these may make liver damage worse.    Talk with your healthcare provider about medicines that have acetaminophen or NSAIDs such as ibuprofen and naproxen. These can also harm your liver.     Stop drinking alcohol. If you find it hard to stop drinking, seek professional help. Consider joining Alcoholics Anonymous or another type of treatment program for support.    If you use IV drugs, you are at high risk for hepatitis B and C. Seek help to stop.     Be sure to ask your healthcare provider about recommended vaccines. These include vaccines for viruses that can cause liver disease.  Follow-up care  Follow up with your healthcare provider or as advised by our staff.  For more information and to learn about support groups for people with liver disease, contact:    [employer], www.liverfoundation.org, 660.951.6476    [employer], www.hepfi.org, 324.729.4163  When to seek medical advice  Call your healthcare provider right away if you have any of the following:    Rapid weight gain with increased size of your belly (abdomen) or leg swelling    Yellow color of your skin or eyes (jaundice) gets worse    Excess bleeding from cuts or injuries  Date Last Reviewed: 6/22/2015 2000-2017 [employer]. [address] [place] [address], [place] [address]. All rights  reserved. This information is not intended as a substitute for professional medical care. Always follow your healthcare professional's instructions.            Gabriella Ramirez MD  [facility]

## 2017-10-12 ENCOUNTER — HOSPITAL ENCOUNTER (OUTPATIENT)
Dept: ULTRASOUND IMAGING | Facility: CLINIC | Age: 52
Discharge: HOME | End: 2017-10-12
Attending: FAMILY MEDICINE | Admitting: FAMILY MEDICINE
Payer: COMMERCIAL

## 2017-10-12 ENCOUNTER — NURSE TRIAGE (OUTPATIENT)
Dept: NURSING | Facility: CLINIC | Age: 52
End: 2017-10-12

## 2017-10-12 DIAGNOSIS — K70.31 ALCOHOLIC CIRRHOSIS OF LIVER WITH ASCITES (H): ICD-10-CM

## 2017-10-12 LAB — INR PPP: 1.45 (ref 0.86–1.14)

## 2017-10-12 PROCEDURE — 36415 COLL VENOUS BLD VENIPUNCTURE: CPT | Performed by: FAMILY MEDICINE

## 2017-10-12 PROCEDURE — 25000125 ZZHC RX 250: Performed by: RADIOLOGY

## 2017-10-12 PROCEDURE — 85610 PROTHROMBIN TIME: CPT | Performed by: FAMILY MEDICINE

## 2017-10-12 PROCEDURE — 27210190 US PARACENTESIS

## 2017-10-12 PROCEDURE — 25000128 H RX IP 250 OP 636: Performed by: RADIOLOGY

## 2017-10-12 PROCEDURE — P9047 ALBUMIN (HUMAN), 25%, 50ML: HCPCS | Performed by: RADIOLOGY

## 2017-10-12 RX ORDER — ALBUMIN (HUMAN) 12.5 G/50ML
37.5 SOLUTION INTRAVENOUS ONCE
Status: COMPLETED | OUTPATIENT
Start: 2017-10-12 | End: 2017-10-12

## 2017-10-12 RX ADMIN — ALBUMIN (HUMAN) 37.5 G: 12.5 SOLUTION INTRAVENOUS at 12:25

## 2017-10-12 RX ADMIN — LIDOCAINE HYDROCHLORIDE 8 ML: 10 INJECTION, SOLUTION EPIDURAL; INFILTRATION; INTRACAUDAL; PERINEURAL at 11:34

## 2017-10-12 NOTE — PROGRESS NOTES
RADIOLOGY PROCEDURE NOTE  Patient name: Ivan Bell  MRN: [id number]  : 1965    Pre-procedure diagnosis: Distention.  Post-procedure diagnosis: Same    Procedure Date/Time: 2017  12:15 PM  Procedure: US guided paracentesis.  Estimated blood loss: None  Specimen(s) collected:  9.8 L ascitic fluid.  The patient tolerated the procedure well with no immediate complications.    See imaging dictation for procedural details.    Provider name: Dontrell Choe  Assistant(s):None

## 2017-10-12 NOTE — IP AVS SNAPSHOT
MRN:[id number]                      After Visit Summary   10/12/2017    Ivan Bell    MRN: [id number]           Visit Information        Provider Department      10/12/2017 11:15 AM [facility]; [facility] N[facility]SE; [facility]           Review of your medicines      UNREVIEWED medicines. Ask your doctor about these medicines        Dose / Directions    MULTIPLE VITAMINS PO        Dose:  1 tablet   Take 1 tablet by mouth daily   Refills:  0       sildenafil 20 MG tablet   Commonly known as:  REVATIO   Used for:  Erectile dysfunction, unspecified erectile dysfunction type        Dose:  20 mg   Take 1 tablet (20 mg) by mouth 3 times daily Take 1-3 po daily prn   Quantity:  90 tablet   Refills:  3       spironolactone 100 MG tablet   Commonly known as:  ALDACTONE   Used for:  Alcoholic cirrhosis of liver with ascites (H)        Dose:  100 mg   Take 1 tablet (100 mg) by mouth daily   Quantity:  30 tablet   Refills:  3       triamcinolone 0.1 % cream   Commonly known as:  KENALOG   Used for:  Rash        Apply sparingly to affected area three times daily as needed for itching.   Quantity:  80 g   Refills:  3       UNABLE TO FIND        MEDICATION NAME: P6   Refills:  0                Protect others around you: Learn how to safely use, store and throw away your medicines at www.disposemymeds.org.         Follow-ups after your visit        Your next 10 appointments already scheduled     Nov 28, 2017  7:45 AM CST   Lab with  LAB   [facility] ([facility])    [address] [place] [id number] [phone]            Nov 28, 2017  8:40 AM CST   (Arrive by 8:25 AM)   Return General Liver with Gonzalo Miramontes MD   [facility] ([facility])    [address] [place] [id number] [phone]               Care Instructions        Further instructions from your care team                              Radiology  Discharge Instructions for Abdominal Paracentesis    You have had an abdominal paracentesis procedure today.  A needle or catheter was put into your abdomen to remove fluid for laboratory studies and/or to remove the extra fluid from your abdomen.    AFTER YOU ARE HOME:    Rest at home today.    Limit physical activity such as lifting, straining, or exercise for 48 hours.  You may resume normal activity in 24 hours.    Resume previous diet and medications.    You may remove bandage in 24 hours.    CALL YOUR PRIMARY PROVIDER IF:    You develop temperature over 101o F, or redness at the drainage site.    You have any other questions or concerns.    AFTER HOURS CALL [facility] AT [phone]    COME TO EMERGENCY ROOM IF:    You develop severe abdominal pain, swelling the size of a baseball or larger, continuous oozing from puncture site longer than 24 hours, bleeding that saturates a gauze dressing even after you have put direct pressure on the site for 15 minutes, severe lightheadedness or fainting.  DO NOT DRIVE YOURSELF.      Your ordering physician will contact you with the laboratory results.        ADDITIONAL INSTRUCTIONS:   ***    I have reviewed and understand these instructions.      __________________________________________________  Ivan Gonsior      __________________________________________________     Nurse/Radiologist Signature      Date: 10/12/2017               Additional Information About Your Visit        [employer] Information     [employer] gives you secure access to your electronic health record. If you see a primary care provider, you can also send messages to your care team and make appointments. If you have questions, please call your primary care clinic.  If you do not have a primary care provider, please call [phone] and they will assist you.        Care EveryWhere ID     This is your Care EveryWhere ID. This could be used by other organizations  to access your [facility] medical records  [id number]         Primary Care Provider Office Phone # Fax #    Rosette Wills [phone]      Equal Access to Services     KAITLYN CAMACHO : Hadii linnea ku alo Sojimyali, waaxda luqadaha, qaybta kaalmada adeegyada, jenny vilchiseddie molina. So [facility] [phone].    ATENCIÓN: Si habla español, tiene a lee disposición servicios gratuitos de asistencia lingüística. Llame al [phone].    We comply with applicable federal civil rights laws and [place] laws. We do not discriminate on the basis of race, color, national origin, age, disability, sex, sexual orientation, or gender identity.            Thank you!     Thank you for choosing [facility] for your care. Our goal is always to provide you with excellent care. Hearing back from our patients is one way we can continue to improve our services. Please take a few minutes to complete the written survey that you may receive in the mail after you visit with us. Thank you!             Medication List: This is a list of all your medications and when to take them. Check marks below indicate your daily home schedule. Keep this list as a reference.      Medications           Morning Afternoon Evening Bedtime As Needed    MULTIPLE VITAMINS PO   Take 1 tablet by mouth daily                                sildenafil 20 MG tablet   Commonly known as:  REVATIO   Take 1 tablet (20 mg) by mouth 3 times daily Take 1-3 po daily prn                                spironolactone 100 MG tablet   Commonly known as:  ALDACTONE   Take 1 tablet (100 mg) by mouth daily                                triamcinolone 0.1 % cream   Commonly known as:  KENALOG   Apply sparingly to affected area three times daily as needed for itching.                                UNABLE TO FIND   MEDICATION NAME: P6

## 2017-10-12 NOTE — IP AVS SNAPSHOT
[facility]    [address] [facility] [address]    Phone:  [phone]                                       After Visit Summary   10/12/2017    Ivan Bell    MRN: [id number]           After Visit Summary Signature Page     I have received my discharge instructions, and my questions have been answered. I have discussed any challenges I see with this plan with the nurse or doctor.    ..........................................................................................................................................  Patient/Patient Representative Signature      ..........................................................................................................................................  Patient Representative Print Name and Relationship to Patient    ..................................................               ................................................  Date                                            Time    ..........................................................................................................................................  Reviewed by Signature/Title    ...................................................              ..............................................  Date                                                            Time

## 2017-10-13 NOTE — TELEPHONE ENCOUNTER
"  Additional Information    Negative: Looks like a broken bone or dislocated joint (e.g., crooked or deformed)    Negative: Sounds like a life-threatening emergency to the triager    Negative: Followed a leg injury    Negative: Leg swelling is main symptom    Negative: Back pain radiating (shooting) into leg(s)    Negative: Knee pain is main symptom    Negative: Ankle pain is main symptom    Negative: Pregnant    Negative: Chest pain    Negative: Difficulty breathing    Negative: Entire foot is cool or blue in comparison to other side    Negative: Unable to walk    Negative: [1] Red area or streak AND [2] fever    Negative: [1] Swollen joint AND [2] fever    Negative: [1] Cast on leg or ankle AND [2] now increased pain    Negative: Patient sounds very sick or weak to the triager    Negative: [1] SEVERE pain (e.g., excruciating, unable to do any normal activities) AND [2] not improved after 2 hours of pain medicine    Negative: [1] Thigh or calf pain AND [2] only 1 side AND [3] present > 1 hour    Negative: [1] Thigh, calf, or ankle swelling AND [2] only 1 side    Negative: [1] Thigh, calf, or ankle swelling AND [2] bilateral AND [3] 1 side is more swollen    Negative: [1] Red area or streak AND [2] large (> 2 in. or 5 cm)    Negative: History of prior \"blood clot\" in leg or lungs (i.e., deep vein thrombosis, pulmonary embolism)    Negative: History of inherited increased risk of blood clots (e.g., Factor 5 Leiden, Anti-thrombin 3, Protein C or Protein S deficiency, Prothrombin mutation)    Negative: Recent illness requiring prolonged bedrest (i.e., immobilization)    Negative: Hip or leg fracture in past 2 months (e.g, or had cast on leg or ankle)    Negative: Major surgery in the past two months    Negative: Cancer treatment in the past two months (or has cancer now)    Negative: Recent long-distance travel with prolonged time in car, bus, plane, or train (i.e., within past 2 weeks; 6 or  more hours duration)    " "Negative: [1] Painful rash AND [2] multiple small blisters grouped together (i.e., dermatomal distribution or \"band\" or \"stripe\")    Negative: Looks like a boil, infected sore, deep ulcer or other infected rash (spreading redness, pus)    Negative: [1] Localized rash is very painful AND [2] no fever    Negative: Numbness in a leg or foot (i.e., loss of sensation)    Negative: Localized pain, redness or hard lump along vein    Negative: [1] MODERATE pain (e.g., interferes with normal activities, limping) AND [2] present > 3 days    Negative: [1] Swollen joint AND [2] no fever or redness    Negative: [1] Leg pain which occurs after walking a certain distance AND [2] disappears with rest AND [3] age > 50    Negative: [1] Pain in front of the lower leg(s) (shins)     AND [2] occurs with running or jumping exercise  (e.g., jogging,  basketball)    Negative: [1] MILD pain (e.g., does not interfere with normal activities) AND [2] present > 7 days    Negative: Leg pain or muscle cramp is a chronic symptom (recurrent or ongoing AND present > 4 weeks)    Negative: Caused by strained muscle (all triage questions negative)    Negative: Caused by overuse from recent vigorous activity (e.g.,  aerobics, jogging/running, physical work, prolonged walking, sports)  (all triage questions negative)    [1] Caused by muscle cramps in the thigh, calf, or foot AND [2] present < 1 hour (brief, now gone) (all triage questions negative)     Wife calling with Ivan:  \"he had a paracentesis today and now he just had severe leg cramps\". Cramps resolved by the time they called, bilat in upper thigh, lasting two minutes.    Paged on call provider for [facility]/for 2nd level triage regarding cramps.  Dr. Garcia is on call, page sent @ 10:29 pm via smart web.    Dr. Garcia advised that due to paracentesis he lost fluid and there is probably fluid shift going on internally and dehydration occurring. Drink plenty of water tonight, may add " luz elena as well.    Called wife back with instructions, she appears to understand directives and agrees with plan.    Protocols used: LEG PAIN-ADULT-AH      Daylin Villeda, RN  [facility]

## 2017-10-27 ENCOUNTER — OFFICE VISIT (OUTPATIENT)
Dept: FAMILY MEDICINE | Facility: CLINIC | Age: 52
End: 2017-10-27
Payer: COMMERCIAL

## 2017-10-27 VITALS
HEART RATE: 109 BPM | OXYGEN SATURATION: 99 % | SYSTOLIC BLOOD PRESSURE: 143 MMHG | WEIGHT: 175 LBS | HEIGHT: 70 IN | DIASTOLIC BLOOD PRESSURE: 89 MMHG | BODY MASS INDEX: 25.05 KG/M2 | TEMPERATURE: 98.7 F

## 2017-10-27 DIAGNOSIS — K70.31 ALCOHOLIC CIRRHOSIS OF LIVER WITH ASCITES (H): Primary | ICD-10-CM

## 2017-10-27 PROCEDURE — 99213 OFFICE O/P EST LOW 20 MIN: CPT | Performed by: INTERNAL MEDICINE

## 2017-10-27 NOTE — PROGRESS NOTES
SUBJECTIVE:   Ivan Bell is a 52 year old male who presents to clinic today for the following health issues:  Chief Complaint   Patient presents with     RECHECK     had fluid drained from stomach 10/12/17. feeling pretty good, not seeing doctor at [facility] until 11/28/17     Imm/Inj     deferred flu and Tdap vaccines      Ivan has a history of alcoholic cirrhosis with ascites and had a paracentesis on 10/11 when he was SOB.  9.8L were removed.  He developed a lot of muscle cramps afterward, but these settled.  He is no loner having SOB.  No ab pain, fevers, chills.  Appetite is good.  He was resumed on spironolactone.  Had trouble with hypoK when on furosemide despite K supplement, so this has been avoided.  He would like to have a recheck of the ascites to make sure it doesn't accumulate so much prior to next intervention.      Problem list and histories reviewed & adjusted, as indicated.  Additional history: as documented    Current Outpatient Prescriptions   Medication Sig Dispense Refill     spironolactone (ALDACTONE) 100 MG tablet Take 1 tablet (100 mg) by mouth daily 30 tablet 3     MULTIPLE VITAMINS PO Take 1 tablet by mouth daily       sildenafil (REVATIO/VIAGRA) 20 MG tablet Take 1 tablet (20 mg) by mouth 3 times daily Take 1-3 po daily prn 90 tablet 3     UNABLE TO FIND MEDICATION NAME: P6       triamcinolone (KENALOG) 0.1 % cream Apply sparingly to affected area three times daily as needed for itching. (Patient not taking: Reported on 10/27/2017) 80 g 3     Allergies   Allergen Reactions     Benadryl [Diphenhydramine] Other (See Comments)     Delirium (visual and auditory hallucinations)       Reviewed and updated as needed this visit by clinical staff  Allergies       Reviewed and updated as needed this visit by Provider         ROS:  Constitutional, gi systems are negative, except as otherwise noted.      OBJECTIVE:   /89 (BP Location: Right arm, Patient Position: Chair, Cuff Size: Adult  "Regular)  Pulse 109  Temp 98.7  F (37.1  C) (Tympanic)  Ht 5' 10\" (1.778 m)  Wt 175 lb (79.4 kg)  SpO2 99%  BMI 25.11 kg/m2  Body mass index is 25.11 kg/(m^2).    GENERAL: healthy, alert and no distress  RESP: lungs clear to auscultation - no rales, rhonchi or wheezes  CV: regular and slightly tachy, normal S1 S2, no S3 or S4, no murmur, click or rub  ABDOMEN: distended and firm, non painful, dull to percussion      ASSESSMENT/PLAN:       1. Alcoholic cirrhosis of liver with ascites (H)    Ivan would like a repeat ultrasound to assess for re-accumulation of ascites and certainly he is quite distended on exam, so this was ordered.  Symptomatically, he is feeling pretty good right now.  He was recently restarted on spironolactone.  Avoiding furosemide due to history of significant hypokalemia on this.  Has follow-up with GI in a month.     - US Abdomen Limited    Jasper Garrett MD  [facility]  "

## 2017-10-27 NOTE — MR AVS SNAPSHOT
After Visit Summary   10/27/2017    Ivan Bell    MRN: [id number]           Patient Information     Date Of Birth          1965        Visit Information        Provider Department      10/27/2017 1:00 PM Jasper Garrett MD [facility]        Today's Diagnoses     Alcoholic cirrhosis of liver with ascites (H)    -  1       Follow-ups after your visit        Your next 10 appointments already scheduled     Oct 30, 2017  3:30 PM CDT   US ABDOMEN LIMITED with [id number]   [facility] Ultrasound ([facility])    [address] [id number] [phone]           Please bring a list of your medicines (including vitamins, minerals and over-the-counter drugs). Also, tell your doctor about any allergies you may have. Wear comfortable clothes and leave your valuables at home.  Adults: No eating or drinking for 8 hours before the exam. You may take medicine with a small sip of water.  Children: - Children 6+ years: No food or drink for 6 hours before exam. - Children 1-5 years: No food or drink for 4 hours before exam. - Infants, breast-fed: may have breast milk up to 2 hours before exam. - Infants, formula: may have bottle until 4 hours before exam.  Please call the Imaging Department at your exam site with any questions.            Nov 28, 2017  7:45 AM CST   Lab with  LAB   [facility] Lab ([facility])    [address] [place] [id number] [phone]            Nov 28, 2017  8:40 AM CST   (Arrive by 8:25 AM)   Return General Liver with Gonzalo Miramontes MD   [facility] Hepatology ([facility])    [address] [place] [id number] [phone]            Nov 28, 2017  9:00 AM CST   NUTRITION VISIT with Diana Heaton RD   [facility] Solid Organ Transplant ([facility])    [address] [place] [address]  "[id number] [phone]              Who to contact     If you have questions or need follow up information about today's clinic visit or your schedule please contact [facility] directly at [phone].  Normal or non-critical lab and imaging results will be communicated to you by MyChart, letter or phone within 4 business days after the clinic has received the results. If you do not hear from us within 7 days, please contact the clinic through [employer] or phone. If you have a critical or abnormal lab result, we will notify you by phone as soon as possible.  Submit refill requests through [employer] or call your pharmacy and they will forward the refill request to us. Please allow 3 business days for your refill to be completed.          Additional Information About Your Visit        [employer] Information     [employer] gives you secure access to your electronic health record. If you see a primary care provider, you can also send messages to your care team and make appointments. If you have questions, please call your primary care clinic.  If you do not have a primary care provider, please call [phone] and they will assist you.        Care EveryWhere ID     This is your Care EveryWhere ID. This could be used by other organizations to access your [facility] medical records  [id number]        Your Vitals Were     Pulse Temperature Height Pulse Oximetry BMI (Body Mass Index)       109 98.7  F (37.1  C) (Tympanic) 5' 10\" (1.778 m) 99% 25.11 kg/m2        Blood Pressure from Last 3 Encounters:   10/27/17 143/89   10/11/17 139/85   04/10/17 (!) 156/91    Weight from Last 3 Encounters:   10/27/17 175 lb (79.4 kg)   10/11/17 172 lb (78 kg)   04/10/17 171 lb 12.8 oz (77.9 kg)              We Performed the Following     US Abdomen Limited        Primary Care Provider Office Phone # Fax #    Rosette Wills [phone] [address] [place] [address]        Equal Access to Services     " KAITLYN CAMACHO : Hadii aad ku jacki Myles, waaxda luqadaha, qaybta kaalmada adeolesya, jenny bhupinder christianoyony short wendytova gaitan . So [facility] [phone].    ATENCIÓN: Si habla espking, tiene a lee disposición servicios gratuitos de asistencia lingüística. Llame al [phone].    We comply with applicable federal civil rights laws and [place] laws. We do not discriminate on the basis of race, color, national origin, age, disability, sex, sexual orientation, or gender identity.            Thank you!     Thank you for choosing [facility]  for your care. Our goal is always to provide you with excellent care. Hearing back from our patients is one way we can continue to improve our services. Please take a few minutes to complete the written survey that you may receive in the mail after your visit with us. Thank you!             Your Updated Medication List - Protect others around you: Learn how to safely use, store and throw away your medicines at www.disposemymeds.org.          This list is accurate as of: 10/27/17  2:23 PM.  Always use your most recent med list.                   Brand Name Dispense Instructions for use Diagnosis    MULTIPLE VITAMINS PO      Take 1 tablet by mouth daily        sildenafil 20 MG tablet    REVATIO    90 tablet    Take 1 tablet (20 mg) by mouth 3 times daily Take 1-3 po daily prn    Erectile dysfunction, unspecified erectile dysfunction type       spironolactone 100 MG tablet    ALDACTONE    30 tablet    Take 1 tablet (100 mg) by mouth daily    Alcoholic cirrhosis of liver with ascites (H)       triamcinolone 0.1 % cream    KENALOG    80 g    Apply sparingly to affected area three times daily as needed for itching.    Rash       UNABLE TO FIND      MEDICATION NAME: P6

## 2017-10-27 NOTE — NURSING NOTE
"Chief Complaint   Patient presents with     RECHECK     had fluid drained from stomach 10/12/17. feeling pretty good, not seeing doctor at [facility] until 11/28/17     Imm/Inj     deferred flu and Tdap vaccines        Initial /89 (BP Location: Right arm, Patient Position: Chair, Cuff Size: Adult Regular)  Pulse 109  Temp 98.7  F (37.1  C) (Tympanic)  Ht 5' 10\" (1.778 m)  Wt 175 lb (79.4 kg)  SpO2 99%  BMI 25.11 kg/m2 Estimated body mass index is 25.11 kg/(m^2) as calculated from the following:    Height as of this encounter: 5' 10\" (1.778 m).    Weight as of this encounter: 175 lb (79.4 kg).  Medication Reconciliation: complete   Jenelle LOZADA CMA ([facility])    "

## 2017-10-30 ENCOUNTER — HOSPITAL ENCOUNTER (EMERGENCY)
Facility: CLINIC | Age: 52
Discharge: HOME | End: 2017-10-30
Attending: EMERGENCY MEDICINE
Payer: COMMERCIAL

## 2017-10-30 ENCOUNTER — HOSPITAL ENCOUNTER (INPATIENT)
Facility: CLINIC | Age: 52
LOS: 4 days | Discharge: HOME HEALTH | End: 2017-11-03
Attending: INTERNAL MEDICINE | Admitting: INTERNAL MEDICINE
Payer: COMMERCIAL

## 2017-10-30 ENCOUNTER — HOSPITAL ENCOUNTER (EMERGENCY)
Facility: CLINIC | Age: 52
Discharge: ANOTHER HOSPITAL | End: 2017-10-30
Attending: EMERGENCY MEDICINE | Admitting: EMERGENCY MEDICINE
Payer: COMMERCIAL

## 2017-10-30 ENCOUNTER — APPOINTMENT (OUTPATIENT)
Dept: CT IMAGING | Facility: CLINIC | Age: 52
End: 2017-10-30
Attending: EMERGENCY MEDICINE
Payer: COMMERCIAL

## 2017-10-30 ENCOUNTER — APPOINTMENT (OUTPATIENT)
Dept: ULTRASOUND IMAGING | Facility: CLINIC | Age: 52
End: 2017-10-30
Attending: EMERGENCY MEDICINE
Payer: COMMERCIAL

## 2017-10-30 VITALS
RESPIRATION RATE: 21 BRPM | DIASTOLIC BLOOD PRESSURE: 46 MMHG | BODY MASS INDEX: 22.67 KG/M2 | OXYGEN SATURATION: 100 % | SYSTOLIC BLOOD PRESSURE: 91 MMHG | HEART RATE: 102 BPM | WEIGHT: 158 LBS | TEMPERATURE: 100.4 F

## 2017-10-30 VITALS
BODY MASS INDEX: 22.62 KG/M2 | OXYGEN SATURATION: 94 % | HEART RATE: 134 BPM | TEMPERATURE: 98.3 F | DIASTOLIC BLOOD PRESSURE: 54 MMHG | SYSTOLIC BLOOD PRESSURE: 94 MMHG | RESPIRATION RATE: 20 BRPM | WEIGHT: 158 LBS | HEIGHT: 70 IN

## 2017-10-30 DIAGNOSIS — R06.02 SHORTNESS OF BREATH: ICD-10-CM

## 2017-10-30 DIAGNOSIS — K76.6 PORTAL HYPERTENSIVE GASTROPATHY (H): ICD-10-CM

## 2017-10-30 DIAGNOSIS — K92.1 BLOOD IN STOOL: ICD-10-CM

## 2017-10-30 DIAGNOSIS — I81 PORTAL VEIN THROMBOSIS: ICD-10-CM

## 2017-10-30 DIAGNOSIS — T81.89XA DRAINING POSTOPERATIVE WOUND, INITIAL ENCOUNTER: ICD-10-CM

## 2017-10-30 DIAGNOSIS — R19.7 DIARRHEA, UNSPECIFIED TYPE: ICD-10-CM

## 2017-10-30 DIAGNOSIS — N20.1 URETERAL STONE: ICD-10-CM

## 2017-10-30 DIAGNOSIS — I85.00 ESOPHAGEAL VARICES WITHOUT BLEEDING, UNSPECIFIED ESOPHAGEAL VARICES TYPE (H): ICD-10-CM

## 2017-10-30 DIAGNOSIS — R78.81 BACTEREMIA: ICD-10-CM

## 2017-10-30 DIAGNOSIS — K65.2 SBP (SPONTANEOUS BACTERIAL PERITONITIS) (H): ICD-10-CM

## 2017-10-30 DIAGNOSIS — K76.6 PORTAL HYPERTENSION (H): ICD-10-CM

## 2017-10-30 DIAGNOSIS — K70.31 ALCOHOLIC CIRRHOSIS OF LIVER WITH ASCITES (H): ICD-10-CM

## 2017-10-30 DIAGNOSIS — D73.5 SPLENIC INFARCT: ICD-10-CM

## 2017-10-30 DIAGNOSIS — I85.10 SECONDARY ESOPHAGEAL VARICES WITHOUT BLEEDING (H): ICD-10-CM

## 2017-10-30 DIAGNOSIS — M79.605 LEFT LEG PAIN: ICD-10-CM

## 2017-10-30 DIAGNOSIS — K92.2 GASTROINTESTINAL HEMORRHAGE, UNSPECIFIED GASTROINTESTINAL HEMORRHAGE TYPE: Primary | ICD-10-CM

## 2017-10-30 DIAGNOSIS — R06.02 SOB (SHORTNESS OF BREATH): ICD-10-CM

## 2017-10-30 DIAGNOSIS — I81 PVT (PORTAL VEIN THROMBOSIS): ICD-10-CM

## 2017-10-30 DIAGNOSIS — R10.84 ABDOMINAL PAIN, GENERALIZED: ICD-10-CM

## 2017-10-30 DIAGNOSIS — N20.0 CALCULUS OF KIDNEY: ICD-10-CM

## 2017-10-30 DIAGNOSIS — D73.5 SPLENIC INFARCTION: ICD-10-CM

## 2017-10-30 DIAGNOSIS — K31.89 PORTAL HYPERTENSIVE GASTROPATHY (H): ICD-10-CM

## 2017-10-30 DIAGNOSIS — I85.11 SECONDARY ESOPHAGEAL VARICES WITH BLEEDING (H): ICD-10-CM

## 2017-10-30 PROBLEM — K74.60 DECOMPENSATED HEPATIC CIRRHOSIS (H): Status: ACTIVE | Noted: 2017-10-30

## 2017-10-30 PROBLEM — K72.90 DECOMPENSATED HEPATIC CIRRHOSIS (H): Status: ACTIVE | Noted: 2017-10-30

## 2017-10-30 LAB
ABO + RH BLD: NORMAL
ABO + RH BLD: NORMAL
ALBUMIN FLD-MCNC: 0.2 G/DL
ALBUMIN SERPL-MCNC: 2.3 G/DL (ref 3.4–5)
ALBUMIN SERPL-MCNC: 2.7 G/DL (ref 3.4–5)
ALP SERPL-CCNC: 163 U/L (ref 40–150)
ALP SERPL-CCNC: 221 U/L (ref 40–150)
ALT SERPL W P-5'-P-CCNC: 17 U/L (ref 0–70)
ALT SERPL W P-5'-P-CCNC: 19 U/L (ref 0–70)
ANION GAP SERPL CALCULATED.3IONS-SCNC: 10 MMOL/L (ref 3–14)
ANION GAP SERPL CALCULATED.3IONS-SCNC: 8 MMOL/L (ref 3–14)
ANION GAP SERPL CALCULATED.3IONS-SCNC: 8 MMOL/L (ref 3–14)
APPEARANCE FLD: NORMAL
AST SERPL W P-5'-P-CCNC: 29 U/L (ref 0–45)
AST SERPL W P-5'-P-CCNC: 38 U/L (ref 0–45)
BASOPHILS # BLD AUTO: 0 10E9/L (ref 0–0.2)
BASOPHILS NFR BLD AUTO: 0.1 %
BILIRUB SERPL-MCNC: 2.3 MG/DL (ref 0.2–1.3)
BILIRUB SERPL-MCNC: 2.7 MG/DL (ref 0.2–1.3)
BLD GP AB SCN SERPL QL: NORMAL
BLD PROD TYP BPU: NORMAL
BLD UNIT ID BPU: 0
BLD UNIT ID BPU: 0
BLOOD BANK CMNT PATIENT-IMP: NORMAL
BLOOD PRODUCT CODE: NORMAL
BLOOD PRODUCT CODE: NORMAL
BPU ID: NORMAL
BPU ID: NORMAL
BUN SERPL-MCNC: 23 MG/DL (ref 7–30)
BUN SERPL-MCNC: 25 MG/DL (ref 7–30)
BUN SERPL-MCNC: 26 MG/DL (ref 7–30)
CALCIUM SERPL-MCNC: 7.2 MG/DL (ref 8.5–10.1)
CALCIUM SERPL-MCNC: 7.9 MG/DL (ref 8.5–10.1)
CALCIUM SERPL-MCNC: 8 MG/DL (ref 8.5–10.1)
CHLORIDE SERPL-SCNC: 104 MMOL/L (ref 94–109)
CHLORIDE SERPL-SCNC: 105 MMOL/L (ref 94–109)
CHLORIDE SERPL-SCNC: 106 MMOL/L (ref 94–109)
CO2 SERPL-SCNC: 19 MMOL/L (ref 20–32)
CO2 SERPL-SCNC: 19 MMOL/L (ref 20–32)
CO2 SERPL-SCNC: 21 MMOL/L (ref 20–32)
COLOR FLD: YELLOW
CREAT SERPL-MCNC: 1.11 MG/DL (ref 0.66–1.25)
CREAT SERPL-MCNC: 1.15 MG/DL (ref 0.66–1.25)
CREAT SERPL-MCNC: 1.24 MG/DL (ref 0.66–1.25)
DIFFERENTIAL METHOD BLD: ABNORMAL
DIFFERENTIAL METHOD BLD: ABNORMAL
EOSINOPHIL # BLD AUTO: 0 10E9/L (ref 0–0.7)
EOSINOPHIL NFR BLD AUTO: 0.2 %
ERYTHROCYTE [DISTWIDTH] IN BLOOD BY AUTOMATED COUNT: 17.2 % (ref 10–15)
ERYTHROCYTE [DISTWIDTH] IN BLOOD BY AUTOMATED COUNT: 17.2 % (ref 10–15)
GFR SERPL CREATININE-BSD FRML MDRD: 61 ML/MIN/1.7M2
GFR SERPL CREATININE-BSD FRML MDRD: 66 ML/MIN/1.7M2
GFR SERPL CREATININE-BSD FRML MDRD: 69 ML/MIN/1.7M2
GLUCOSE SERPL-MCNC: 103 MG/DL (ref 70–99)
GLUCOSE SERPL-MCNC: 113 MG/DL (ref 70–99)
GLUCOSE SERPL-MCNC: 127 MG/DL (ref 70–99)
GRAM STN SPEC: NORMAL
GRAM STN SPEC: NORMAL
HCT VFR BLD AUTO: 20.9 % (ref 40–53)
HCT VFR BLD AUTO: 27.2 % (ref 40–53)
HGB BLD-MCNC: 6.5 G/DL (ref 13.3–17.7)
HGB BLD-MCNC: 6.5 G/DL (ref 13.3–17.7)
HGB BLD-MCNC: 8.8 G/DL (ref 13.3–17.7)
IMM GRANULOCYTES # BLD: 0 10E9/L (ref 0–0.4)
IMM GRANULOCYTES NFR BLD: 0.2 %
INR PPP: 1.83 (ref 0.86–1.14)
INR PPP: 2.76 (ref 0.86–1.14)
LACTATE BLD-SCNC: 2.3 MMOL/L (ref 0.7–2)
LACTATE BLD-SCNC: 2.6 MMOL/L (ref 0.7–2)
LACTATE BLD-SCNC: 3.3 MMOL/L (ref 0.7–2)
LYMPHOCYTES # BLD AUTO: 0.6 10E9/L (ref 0.8–5.3)
LYMPHOCYTES # BLD AUTO: 1.4 10E9/L (ref 0.8–5.3)
LYMPHOCYTES NFR BLD AUTO: 12 %
LYMPHOCYTES NFR BLD AUTO: 6.6 %
LYMPHOCYTES NFR FLD MANUAL: 2 %
MCH RBC QN AUTO: 26.3 PG (ref 26.5–33)
MCH RBC QN AUTO: 26.8 PG (ref 26.5–33)
MCHC RBC AUTO-ENTMCNC: 31.1 G/DL (ref 31.5–36.5)
MCHC RBC AUTO-ENTMCNC: 32.4 G/DL (ref 31.5–36.5)
MCV RBC AUTO: 83 FL (ref 78–100)
MCV RBC AUTO: 85 FL (ref 78–100)
MONOCYTES # BLD AUTO: 0.4 10E9/L (ref 0–1.3)
MONOCYTES # BLD AUTO: 1.3 10E9/L (ref 0–1.3)
MONOCYTES NFR BLD AUTO: 11.1 %
MONOCYTES NFR BLD AUTO: 5.1 %
NEUTROPHILS # BLD AUTO: 7.4 10E9/L (ref 1.6–8.3)
NEUTROPHILS # BLD AUTO: 8.6 10E9/L (ref 1.6–8.3)
NEUTROPHILS NFR BLD AUTO: 76.4 %
NEUTROPHILS NFR BLD AUTO: 88.3 %
NEUTS BAND NFR FLD MANUAL: 74 %
NUM BPU REQUESTED: 2
OTHER CELLS FLD MANUAL: 24 %
PLATELET # BLD AUTO: 120 10E9/L (ref 150–450)
PLATELET # BLD AUTO: 159 10E9/L (ref 150–450)
POTASSIUM SERPL-SCNC: 4.4 MMOL/L (ref 3.4–5.3)
POTASSIUM SERPL-SCNC: 4.6 MMOL/L (ref 3.4–5.3)
POTASSIUM SERPL-SCNC: 4.7 MMOL/L (ref 3.4–5.3)
PROT SERPL-MCNC: 6 G/DL (ref 6.8–8.8)
PROT SERPL-MCNC: 6.3 G/DL (ref 6.8–8.8)
RBC # BLD AUTO: 2.47 10E12/L (ref 4.4–5.9)
RBC # BLD AUTO: 3.28 10E12/L (ref 4.4–5.9)
SODIUM SERPL-SCNC: 133 MMOL/L (ref 133–144)
SODIUM SERPL-SCNC: 133 MMOL/L (ref 133–144)
SODIUM SERPL-SCNC: 134 MMOL/L (ref 133–144)
SPECIMEN EXP DATE BLD: NORMAL
SPECIMEN SOURCE FLD: NORMAL
SPECIMEN SOURCE FLD: NORMAL
SPECIMEN SOURCE: NORMAL
TRANSFUSION STATUS PATIENT QL: NORMAL
WBC # BLD AUTO: 11.3 10E9/L (ref 4–11)
WBC # BLD AUTO: 8.5 10E9/L (ref 4–11)
WBC # FLD AUTO: 7813 /UL

## 2017-10-30 PROCEDURE — 85018 HEMOGLOBIN: CPT | Performed by: INTERNAL MEDICINE

## 2017-10-30 PROCEDURE — 86923 COMPATIBILITY TEST ELECTRIC: CPT | Performed by: INTERNAL MEDICINE

## 2017-10-30 PROCEDURE — 76705 ECHO EXAM OF ABDOMEN: CPT | Mod: 26 | Performed by: EMERGENCY MEDICINE

## 2017-10-30 PROCEDURE — 25000132 ZZH RX MED GY IP 250 OP 250 PS 637: Performed by: EMERGENCY MEDICINE

## 2017-10-30 PROCEDURE — 49082 ABD PARACENTESIS: CPT | Mod: GC | Performed by: INTERNAL MEDICINE

## 2017-10-30 PROCEDURE — P9016 RBC LEUKOCYTES REDUCED: HCPCS | Performed by: EMERGENCY MEDICINE

## 2017-10-30 PROCEDURE — 99285 EMERGENCY DEPT VISIT HI MDM: CPT | Mod: 25 | Performed by: EMERGENCY MEDICINE

## 2017-10-30 PROCEDURE — 96366 THER/PROPH/DIAG IV INF ADDON: CPT | Performed by: EMERGENCY MEDICINE

## 2017-10-30 PROCEDURE — 96376 TX/PRO/DX INJ SAME DRUG ADON: CPT | Performed by: EMERGENCY MEDICINE

## 2017-10-30 PROCEDURE — 85025 COMPLETE CBC W/AUTO DIFF WBC: CPT | Performed by: EMERGENCY MEDICINE

## 2017-10-30 PROCEDURE — 99223 1ST HOSP IP/OBS HIGH 75: CPT | Mod: 25 | Performed by: INTERNAL MEDICINE

## 2017-10-30 PROCEDURE — 86900 BLOOD TYPING SEROLOGIC ABO: CPT | Performed by: INTERNAL MEDICINE

## 2017-10-30 PROCEDURE — 96365 THER/PROPH/DIAG IV INF INIT: CPT | Performed by: EMERGENCY MEDICINE

## 2017-10-30 PROCEDURE — 80048 BASIC METABOLIC PNL TOTAL CA: CPT | Performed by: PHYSICIAN ASSISTANT

## 2017-10-30 PROCEDURE — 49083 ABD PARACENTESIS W/IMAGING: CPT | Performed by: EMERGENCY MEDICINE

## 2017-10-30 PROCEDURE — 87077 CULTURE AEROBIC IDENTIFY: CPT | Performed by: EMERGENCY MEDICINE

## 2017-10-30 PROCEDURE — 12001 RPR S/N/AX/GEN/TRNK 2.5CM/<: CPT | Performed by: EMERGENCY MEDICINE

## 2017-10-30 PROCEDURE — 86901 BLOOD TYPING SEROLOGIC RH(D): CPT | Performed by: EMERGENCY MEDICINE

## 2017-10-30 PROCEDURE — 85610 PROTHROMBIN TIME: CPT | Performed by: INTERNAL MEDICINE

## 2017-10-30 PROCEDURE — 86850 RBC ANTIBODY SCREEN: CPT | Performed by: INTERNAL MEDICINE

## 2017-10-30 PROCEDURE — 99285 EMERGENCY DEPT VISIT HI MDM: CPT | Mod: 25,27 | Performed by: EMERGENCY MEDICINE

## 2017-10-30 PROCEDURE — 25000131 ZZH RX MED GY IP 250 OP 636 PS 637: Performed by: INTERNAL MEDICINE

## 2017-10-30 PROCEDURE — 87040 BLOOD CULTURE FOR BACTERIA: CPT | Performed by: EMERGENCY MEDICINE

## 2017-10-30 PROCEDURE — 25000128 H RX IP 250 OP 636

## 2017-10-30 PROCEDURE — 96375 TX/PRO/DX INJ NEW DRUG ADDON: CPT | Performed by: EMERGENCY MEDICINE

## 2017-10-30 PROCEDURE — 89051 BODY FLUID CELL COUNT: CPT | Performed by: INTERNAL MEDICINE

## 2017-10-30 PROCEDURE — 25000128 H RX IP 250 OP 636: Performed by: EMERGENCY MEDICINE

## 2017-10-30 PROCEDURE — 86901 BLOOD TYPING SEROLOGIC RH(D): CPT | Performed by: INTERNAL MEDICINE

## 2017-10-30 PROCEDURE — 12001 RPR S/N/AX/GEN/TRNK 2.5CM/<: CPT | Mod: Z6 | Performed by: EMERGENCY MEDICINE

## 2017-10-30 PROCEDURE — 87040 BLOOD CULTURE FOR BACTERIA: CPT | Performed by: STUDENT IN AN ORGANIZED HEALTH CARE EDUCATION/TRAINING PROGRAM

## 2017-10-30 PROCEDURE — 87205 SMEAR GRAM STAIN: CPT | Performed by: INTERNAL MEDICINE

## 2017-10-30 PROCEDURE — 40000141 ZZH STATISTIC PERIPHERAL IV START W/O US GUIDANCE

## 2017-10-30 PROCEDURE — 87186 SC STD MICRODIL/AGAR DIL: CPT | Performed by: EMERGENCY MEDICINE

## 2017-10-30 PROCEDURE — 83605 ASSAY OF LACTIC ACID: CPT | Mod: 91 | Performed by: EMERGENCY MEDICINE

## 2017-10-30 PROCEDURE — 76705 ECHO EXAM OF ABDOMEN: CPT | Performed by: EMERGENCY MEDICINE

## 2017-10-30 PROCEDURE — 36430 TRANSFUSION BLD/BLD COMPNT: CPT | Performed by: EMERGENCY MEDICINE

## 2017-10-30 PROCEDURE — 96365 THER/PROPH/DIAG IV INF INIT: CPT | Mod: 59 | Performed by: EMERGENCY MEDICINE

## 2017-10-30 PROCEDURE — 25000125 ZZHC RX 250: Performed by: EMERGENCY MEDICINE

## 2017-10-30 PROCEDURE — 96361 HYDRATE IV INFUSION ADD-ON: CPT | Performed by: EMERGENCY MEDICINE

## 2017-10-30 PROCEDURE — 86850 RBC ANTIBODY SCREEN: CPT | Performed by: EMERGENCY MEDICINE

## 2017-10-30 PROCEDURE — S0164 INJECTION PANTROPRAZOLE: HCPCS | Performed by: INTERNAL MEDICINE

## 2017-10-30 PROCEDURE — 87070 CULTURE OTHR SPECIMN AEROBIC: CPT | Performed by: INTERNAL MEDICINE

## 2017-10-30 PROCEDURE — 87800 DETECT AGNT MULT DNA DIREC: CPT | Performed by: EMERGENCY MEDICINE

## 2017-10-30 PROCEDURE — P9047 ALBUMIN (HUMAN), 25%, 50ML: HCPCS | Performed by: INTERNAL MEDICINE

## 2017-10-30 PROCEDURE — 36415 COLL VENOUS BLD VENIPUNCTURE: CPT | Performed by: PHYSICIAN ASSISTANT

## 2017-10-30 PROCEDURE — 83605 ASSAY OF LACTIC ACID: CPT | Performed by: INTERNAL MEDICINE

## 2017-10-30 PROCEDURE — 87040 BLOOD CULTURE FOR BACTERIA: CPT | Performed by: PHYSICIAN ASSISTANT

## 2017-10-30 PROCEDURE — 93971 EXTREMITY STUDY: CPT | Mod: LT

## 2017-10-30 PROCEDURE — 96367 TX/PROPH/DG ADDL SEQ IV INF: CPT | Performed by: EMERGENCY MEDICINE

## 2017-10-30 PROCEDURE — 25000125 ZZHC RX 250: Performed by: INTERNAL MEDICINE

## 2017-10-30 PROCEDURE — P9016 RBC LEUKOCYTES REDUCED: HCPCS | Performed by: INTERNAL MEDICINE

## 2017-10-30 PROCEDURE — 12000006 ZZH R&B IMCU INTERMEDIATE UMMC

## 2017-10-30 PROCEDURE — 82042 OTHER SOURCE ALBUMIN QUAN EA: CPT | Performed by: INTERNAL MEDICINE

## 2017-10-30 PROCEDURE — 49083 ABD PARACENTESIS W/IMAGING: CPT | Mod: Z6 | Performed by: EMERGENCY MEDICINE

## 2017-10-30 PROCEDURE — 86923 COMPATIBILITY TEST ELECTRIC: CPT | Performed by: EMERGENCY MEDICINE

## 2017-10-30 PROCEDURE — 83605 ASSAY OF LACTIC ACID: CPT | Performed by: EMERGENCY MEDICINE

## 2017-10-30 PROCEDURE — 74177 CT ABD & PELVIS W/CONTRAST: CPT

## 2017-10-30 PROCEDURE — 25000128 H RX IP 250 OP 636: Performed by: INTERNAL MEDICINE

## 2017-10-30 PROCEDURE — 25000131 ZZH RX MED GY IP 250 OP 636 PS 637: Performed by: EMERGENCY MEDICINE

## 2017-10-30 PROCEDURE — 80053 COMPREHEN METABOLIC PANEL: CPT | Performed by: EMERGENCY MEDICINE

## 2017-10-30 PROCEDURE — 0W9G3ZX DRAINAGE OF PERITONEAL CAVITY, PERCUTANEOUS APPROACH, DIAGNOSTIC: ICD-10-PCS | Performed by: INTERNAL MEDICINE

## 2017-10-30 PROCEDURE — 86900 BLOOD TYPING SEROLOGIC ABO: CPT | Performed by: EMERGENCY MEDICINE

## 2017-10-30 RX ORDER — CEFTRIAXONE 1 G/1
1 INJECTION, POWDER, FOR SOLUTION INTRAMUSCULAR; INTRAVENOUS EVERY 24 HOURS
Status: DISCONTINUED | OUTPATIENT
Start: 2017-10-30 | End: 2017-10-30 | Stop reason: CLARIF

## 2017-10-30 RX ORDER — ALBUMIN (HUMAN) 12.5 G/50ML
100 SOLUTION INTRAVENOUS ONCE
Status: COMPLETED | OUTPATIENT
Start: 2017-10-30 | End: 2017-10-30

## 2017-10-30 RX ORDER — ONDANSETRON 2 MG/ML
4 INJECTION INTRAMUSCULAR; INTRAVENOUS EVERY 6 HOURS PRN
Status: DISCONTINUED | OUTPATIENT
Start: 2017-10-30 | End: 2017-11-03 | Stop reason: HOSPADM

## 2017-10-30 RX ORDER — OCTREOTIDE ACETATE 50 UG/ML
50 INJECTION, SOLUTION INTRAVENOUS; SUBCUTANEOUS 3 TIMES DAILY
Status: DISCONTINUED | OUTPATIENT
Start: 2017-10-30 | End: 2017-10-30 | Stop reason: CLARIF

## 2017-10-30 RX ORDER — CEFTRIAXONE SODIUM 2 G/50ML
2 INJECTION, SOLUTION INTRAVENOUS EVERY 24 HOURS
Status: DISCONTINUED | OUTPATIENT
Start: 2017-10-30 | End: 2017-10-30 | Stop reason: HOSPADM

## 2017-10-30 RX ORDER — ONDANSETRON 2 MG/ML
4 INJECTION INTRAMUSCULAR; INTRAVENOUS
Status: DISCONTINUED | OUTPATIENT
Start: 2017-10-30 | End: 2017-10-30 | Stop reason: HOSPADM

## 2017-10-30 RX ORDER — HYDROMORPHONE HYDROCHLORIDE 1 MG/ML
0.5 INJECTION, SOLUTION INTRAMUSCULAR; INTRAVENOUS; SUBCUTANEOUS
Status: COMPLETED | OUTPATIENT
Start: 2017-10-30 | End: 2017-10-30

## 2017-10-30 RX ORDER — HYDROMORPHONE HYDROCHLORIDE 1 MG/ML
0.5 INJECTION, SOLUTION INTRAMUSCULAR; INTRAVENOUS; SUBCUTANEOUS
Status: DISCONTINUED | OUTPATIENT
Start: 2017-10-30 | End: 2017-10-30 | Stop reason: HOSPADM

## 2017-10-30 RX ORDER — LIDOCAINE 40 MG/G
CREAM TOPICAL
Status: DISCONTINUED | OUTPATIENT
Start: 2017-10-30 | End: 2017-11-03 | Stop reason: HOSPADM

## 2017-10-30 RX ORDER — CEFTRIAXONE 2 G/1
2 INJECTION, POWDER, FOR SOLUTION INTRAMUSCULAR; INTRAVENOUS EVERY 24 HOURS
Status: DISCONTINUED | OUTPATIENT
Start: 2017-10-31 | End: 2017-11-03 | Stop reason: HOSPADM

## 2017-10-30 RX ORDER — LIDOCAINE 40 MG/G
CREAM TOPICAL
Status: DISCONTINUED | OUTPATIENT
Start: 2017-10-30 | End: 2017-10-30 | Stop reason: HOSPADM

## 2017-10-30 RX ORDER — SODIUM CHLORIDE 9 MG/ML
1000 INJECTION, SOLUTION INTRAVENOUS CONTINUOUS
Status: DISCONTINUED | OUTPATIENT
Start: 2017-10-30 | End: 2017-10-30 | Stop reason: HOSPADM

## 2017-10-30 RX ORDER — OCTREOTIDE ACETATE 50 UG/ML
50 INJECTION, SOLUTION INTRAVENOUS; SUBCUTANEOUS ONCE
Status: COMPLETED | OUTPATIENT
Start: 2017-10-30 | End: 2017-10-30

## 2017-10-30 RX ORDER — NALOXONE HYDROCHLORIDE 0.4 MG/ML
.1-.4 INJECTION, SOLUTION INTRAMUSCULAR; INTRAVENOUS; SUBCUTANEOUS
Status: DISCONTINUED | OUTPATIENT
Start: 2017-10-30 | End: 2017-11-03 | Stop reason: HOSPADM

## 2017-10-30 RX ORDER — ONDANSETRON 4 MG/1
4 TABLET, ORALLY DISINTEGRATING ORAL EVERY 6 HOURS PRN
Status: DISCONTINUED | OUTPATIENT
Start: 2017-10-30 | End: 2017-11-03 | Stop reason: HOSPADM

## 2017-10-30 RX ORDER — HYDROMORPHONE HYDROCHLORIDE 1 MG/ML
INJECTION, SOLUTION INTRAMUSCULAR; INTRAVENOUS; SUBCUTANEOUS
Status: COMPLETED
Start: 2017-10-30 | End: 2017-10-30

## 2017-10-30 RX ORDER — HYDROMORPHONE HYDROCHLORIDE 1 MG/ML
0.5 INJECTION, SOLUTION INTRAMUSCULAR; INTRAVENOUS; SUBCUTANEOUS ONCE
Status: COMPLETED | OUTPATIENT
Start: 2017-10-30 | End: 2017-10-30

## 2017-10-30 RX ORDER — LACTULOSE 10 G/15ML
15 SOLUTION ORAL 2 TIMES DAILY
COMMUNITY
End: 2018-03-22

## 2017-10-30 RX ORDER — ACETAMINOPHEN 500 MG
1000 TABLET ORAL ONCE
Status: COMPLETED | OUTPATIENT
Start: 2017-10-30 | End: 2017-10-30

## 2017-10-30 RX ORDER — LIDOCAINE HYDROCHLORIDE AND EPINEPHRINE 10; 10 MG/ML; UG/ML
INJECTION, SOLUTION INFILTRATION; PERINEURAL
Status: DISCONTINUED
Start: 2017-10-30 | End: 2017-10-30 | Stop reason: HOSPADM

## 2017-10-30 RX ORDER — ALBUMIN (HUMAN) 12.5 G/50ML
75 SOLUTION INTRAVENOUS ONCE
Status: COMPLETED | OUTPATIENT
Start: 2017-11-01 | End: 2017-11-01

## 2017-10-30 RX ORDER — OCTREOTIDE ACETATE 100 UG/ML
50 INJECTION, SOLUTION INTRAVENOUS; SUBCUTANEOUS 3 TIMES DAILY
Status: DISCONTINUED | OUTPATIENT
Start: 2017-10-30 | End: 2017-10-30 | Stop reason: HOSPADM

## 2017-10-30 RX ORDER — HYDROMORPHONE HYDROCHLORIDE 1 MG/ML
0.5 INJECTION, SOLUTION INTRAMUSCULAR; INTRAVENOUS; SUBCUTANEOUS ONCE
Status: COMPLETED | OUTPATIENT
Start: 2017-10-30 | End: 2017-10-31

## 2017-10-30 RX ORDER — IOPAMIDOL 755 MG/ML
77 INJECTION, SOLUTION INTRAVASCULAR ONCE
Status: COMPLETED | OUTPATIENT
Start: 2017-10-30 | End: 2017-10-30

## 2017-10-30 RX ORDER — ALBUMIN (HUMAN) 12.5 G/50ML
50 SOLUTION INTRAVENOUS ONCE
Status: COMPLETED | OUTPATIENT
Start: 2017-10-30 | End: 2017-10-30

## 2017-10-30 RX ADMIN — ALBUMIN HUMAN 50 G: 0.25 SOLUTION INTRAVENOUS at 01:11

## 2017-10-30 RX ADMIN — OCTREOTIDE ACETATE 50 MCG/HR: 200 INJECTION, SOLUTION INTRAVENOUS; SUBCUTANEOUS at 20:59

## 2017-10-30 RX ADMIN — CEFTRIAXONE SODIUM 2 G: 2 INJECTION, SOLUTION INTRAVENOUS at 14:11

## 2017-10-30 RX ADMIN — OCTREOTIDE ACETATE 50 MCG: 50 INJECTION, SOLUTION INTRAVENOUS; SUBCUTANEOUS at 20:56

## 2017-10-30 RX ADMIN — HYDROMORPHONE HYDROCHLORIDE 1 MG: 1 INJECTION, SOLUTION INTRAMUSCULAR; INTRAVENOUS; SUBCUTANEOUS at 01:15

## 2017-10-30 RX ADMIN — SODIUM CHLORIDE 59 ML: 9 INJECTION, SOLUTION INTRAVENOUS at 15:17

## 2017-10-30 RX ADMIN — SODIUM CHLORIDE 1000 ML: 9 INJECTION, SOLUTION INTRAVENOUS at 17:36

## 2017-10-30 RX ADMIN — SODIUM CHLORIDE 1000 ML: 9 INJECTION, SOLUTION INTRAVENOUS at 15:01

## 2017-10-30 RX ADMIN — PANTOPRAZOLE SODIUM 8 MG/HR: 40 INJECTION, POWDER, FOR SOLUTION INTRAVENOUS at 21:12

## 2017-10-30 RX ADMIN — SODIUM CHLORIDE 1000 ML: 9 INJECTION, SOLUTION INTRAVENOUS at 13:38

## 2017-10-30 RX ADMIN — ALBUMIN (HUMAN) 100 G: 12.5 SOLUTION INTRAVENOUS at 21:50

## 2017-10-30 RX ADMIN — PANTOPRAZOLE SODIUM 80 MG: 40 INJECTION, POWDER, FOR SOLUTION INTRAVENOUS at 20:58

## 2017-10-30 RX ADMIN — IOPAMIDOL 77 ML: 755 INJECTION, SOLUTION INTRAVENOUS at 15:16

## 2017-10-30 RX ADMIN — HYDROMORPHONE HYDROCHLORIDE 0.5 MG: 1 INJECTION, SOLUTION INTRAMUSCULAR; INTRAVENOUS; SUBCUTANEOUS at 17:01

## 2017-10-30 RX ADMIN — OCTREOTIDE ACETATE 50 MCG: 100 INJECTION, SOLUTION INTRAVENOUS; SUBCUTANEOUS at 17:30

## 2017-10-30 RX ADMIN — ACETAMINOPHEN 1000 MG: 500 TABLET ORAL at 13:42

## 2017-10-30 RX ADMIN — HYDROMORPHONE HYDROCHLORIDE 0.5 MG: 1 INJECTION, SOLUTION INTRAMUSCULAR; INTRAVENOUS; SUBCUTANEOUS at 17:47

## 2017-10-30 RX ADMIN — HYDROMORPHONE HYDROCHLORIDE 0.5 MG: 1 INJECTION, SOLUTION INTRAMUSCULAR; INTRAVENOUS; SUBCUTANEOUS at 02:26

## 2017-10-30 RX ADMIN — OCTREOTIDE ACETATE 50 MCG/HR: 200 INJECTION, SOLUTION INTRAVENOUS; SUBCUTANEOUS at 17:32

## 2017-10-30 ASSESSMENT — ENCOUNTER SYMPTOMS
EYES NEGATIVE: 1
SHORTNESS OF BREATH: 1
PSYCHIATRIC NEGATIVE: 1
RESPIRATORY NEGATIVE: 1
ABDOMINAL DISTENTION: 1
FEVER: 1
BLOOD IN STOOL: 1
HEMATOLOGIC/LYMPHATIC NEGATIVE: 1
ABDOMINAL PAIN: 1
DIARRHEA: 1
ALLERGIC/IMMUNOLOGIC NEGATIVE: 1
NEUROLOGICAL NEGATIVE: 1
FEVER: 0
DIARRHEA: 1
ABDOMINAL DISTENTION: 1
ENDOCRINE NEGATIVE: 1
CARDIOVASCULAR NEGATIVE: 1

## 2017-10-30 ASSESSMENT — ACTIVITIES OF DAILY LIVING (ADL): ADLS_ACUITY_SCORE: 11

## 2017-10-30 NOTE — LETTER
Transition Communication Hand-off for Care Transitions to Next Level of Care Provider    Name: Ivan Bell  MRN #: [id number]  Primary Care Provider: Rosette Wills     Primary Clinic: [address] [place] [address] [address] [place] [address]     Reason for Hospitalization:  fever  leg pain  GI bleed  GI bleed  Decompensated hepatic cirrhosis (H)  GI bleed  Admit Date/Time: 10/30/2017  6:39 PM  Discharge Date: 11/3/17  Payor Source: Payor: BCBS / Plan: [employer] / Product Type: Indemnity /     Readmission Assessment Measure (VINNIE) Risk Score/category: Low    Reason for Communication Hand-off Referral: Multiple providers/specialties    Discharge Plan: home with IV antibiotics       Concern for non-adherence with plan of care:   Y/N no  Already enrolled in Tele-monitoring program and name of program:  no  Follow-up specialty is recommended: Yes    Follow-up plan:  Future Appointments  Date Time Provider Department Center   11/3/2017 1:30 PM Vanessa Em RN [facility]   11/8/2017 9:15 AM  LAB [facility]   11/8/2017 10:00 AM Gonzalo Wahl MD [facility]   11/28/2017 7:45 AM  LAB [facility]   11/28/2017 8:40 AM Gonzalo Wahl MD [facility]   11/28/2017 9:00 AM Diana Heaton RD [facility]       Referrals     Future Labs/Procedures    GASTROENTEROLOGY ADULT REF PROCEDURE ONLY     Comments:    Last Lab Result: Creatinine (mg/dL)       Date                     Value                 11/01/2017               0.97             ----------  Body mass index is 21.74 kg/(m^2).     Needed:  No  Language:  English    Patient will be contacted to schedule procedure.     Please be aware that coverage of these services is subject to the terms and limitations of your health insurance plan.  Call member services at your health plan with any benefit or coverage questions.  Any procedures must be performed at a [facility] facility OR coordinated by your clinic's referral office.    Please bring  the following with you to your appointment:    (1) Any X-Rays, CTs or MRIs which have been performed.  Contact the facility where they were done to arrange for  prior to your scheduled appointment.    (2) List of current medications   (3) This referral request   (4) Any documents/labs given to you for this referral    Home infusion referral     Comments:    Your provider has referred you to: [facility]: [facility] [phone]   [email or web address]    Local Address (if different from home address): N/A    Anticipated Length of Therapy: 2 week course: start date was 10/31/17    Ceftriaxone 2 Gm IV every 24 hours    Home Infusion Pharmacist to adjust therapy based on labs and clinical assessments: Yes    Labs:  May draw labs from Venous Catheter: Yes  Home Infusion Pharmacist to order labs based on therapy type and clinical assessments: Yes  Call/Fax Lab Results to:     Agency Staff to assess nursing needs for Infusion Therapy.    Access Device Management:  IV Access Type: midline  Flush with Heparin and Normal Saline IVP PRN and routine site care (per agency protocol) to maintain access device? Yes    Medication Therapy Management Referral     Comments:    Reason for referral:  on more than 10 medications and hospitalized or in the ED in the past 6 months  and takes rifaxamin or lactulose     This service is designed to help you get the most from your medications.  A specially trained pharmacist will work closely with you and your doctors  to solve any problems related to your medications and to help you get the   best results from taking them.      The Medication Therapy Management staff will call you to schedule an appointment.            Key Recommendations:  See AVS    Jolanta Gomes RN, BSN  Care Coordinator [facility]  Pager: [phone]  Phone: [phone]    AVS/Discharge Summary is the source of truth; this is a helpful guide for  improved communication of patient story

## 2017-10-30 NOTE — IP AVS SNAPSHOT
[address] [address]    Phone:  [phone]                                       After Visit Summary   10/30/2017    Ivan Bell    MRN: [id number]           After Visit Summary Signature Page     I have received my discharge instructions, and my questions have been answered. I have discussed any challenges I see with this plan with the nurse or doctor.    ..........................................................................................................................................  Patient/Patient Representative Signature      ..........................................................................................................................................  Patient Representative Print Name and Relationship to Patient    ..................................................               ................................................  Date                                            Time    ..........................................................................................................................................  Reviewed by Signature/Title    ...................................................              ..............................................  Date                                                            Time

## 2017-10-30 NOTE — IP AVS SNAPSHOT
MRN:[id number]                      After Visit Summary   10/30/2017    Ivan Bell    MRN: [id number]           Thank you!     Thank you for choosing [facility] for your care. Our goal is always to provide you with excellent care. Hearing back from our patients is one way we can continue to improve our services. Please take a few minutes to complete the written survey that you may receive in the mail after you visit with us. Thank you!        Patient Information     Date Of Birth          1965        Designated Caregiver       Most Recent Value    Caregiver    Will someone help with your care after discharge? yes    Name of designated caregiver Elicia    Phone number of caregiver [phone]    Caregiver address home address on file      About your hospital stay     You were admitted on:  October 30, 2017 You last received care in the:  [address] [facility]    You were discharged on:  November 3, 2017        Reason for your hospital stay       You were admitted with an upper gastrointestinal bleed.  An endoscopy was completed and esophageal varices were found.  Banding was completed.    You were also found to have spontaneous bacterial peritonitis (infection of your ascites) and bacteremia (infection of your blood).  You were started on antibiotics for this and will be continued on the intravenous antibiotics at discharge.                  Who to Call     For medical emergencies, please call 911.  For non-urgent questions about your medical care, please call your primary care provider or clinic, [phone]  For questions related to your surgery, please call your surgery clinic        Attending Provider     Provider Specialty    Ton Javier MD Internal Medicine    Stepan Rick MD Internal Medicine    Eric May MD Internal Medicine       Primary Care Provider Office Phone # Fax #    Rosette Wills [phone]      After Care Instructions     Activity        Your activity upon discharge: activity as tolerated            Diet       Follow this diet upon discharge: low salt diet            Discharge Instructions       You have follow up scheduled with Gastroenterology on 11/8/2017.  You should have labs completed before that appointment.  You will also need a repeat endoscopy in 1 month after the banding.  You will need to continue the pantoprazole 40 mg twice daily until you have the endoscopy.  They will advise you on the long term plan for this medication after.      For the infection of your ascites and your blood infection you will need to continue the ceftriaxone intravenously for 10 more doses. After you complete the last dose you will start taking the ciprofloxacin daily for prophylaxis to prevent recurrence.  Recommend that your reduce the dose of your spironolactone to 50 mg daily (previously 100 mg).  Will be adding another medication call furosemide (or lasix) 20 mg daily which should help prevent re-accumulation of the fluid.                  Follow-up Appointments     Adult [facility] Follow-up and recommended labs and tests       Follow up with Dr. Bales on 11/8/2018. The following labs/tests are recommended: BMP, hepatic panel, CBC, INR.    Appointments on [place] and/or [facility] (with [facility] or [facility] provider or service). Call [phone] if you haven't heard regarding these appointments within 7 days of discharge.                  Your next 10 appointments already scheduled     Nov 08, 2017  9:15 AM CST   Lab with  LAB   [facility])    [address] [place] [address] [id number] [phone]            Nov 08, 2017 10:00 AM CST   (Arrive by 9:45 AM)   Return General Liver with Gonzalo Miramontes MD   [facility] Hepatology ([facility])    [address] [place] [address] [id number] [phone]            Nov 28, 2017  7:45 AM CST   Lab with  LAB     [facility] ([facility])    [address]  1st Floor  [facility] [address]   [phone]            Nov 28, 2017  8:40 AM CST   (Arrive by 8:25 AM)   Return General Liver with Gonzalo Miramontes MD   [facility] Hepatology ([facility])    [address]  [address] [facility] [address]   [phone]            Nov 28, 2017  9:00 AM CST   NUTRITION VISIT with Diana Heaton RD   [facility] Solid Organ Transplant ([facility])    [address]  [address] [facility] [address]   [phone]            Dec 08, 2017   Procedure with Elsy Chamorro MD   [facility] [place], Endoscopy ([facility])    [address] [address]   [phone]           The [facility] is located on the corner of [address] and [address] on the [facility]. It is easily accessible from virtually any point in the [facility] area, via [employer] and [employer].              Additional Services     GASTROENTEROLOGY ADULT REF PROCEDURE ONLY       Last Lab Result: Creatinine (mg/dL)       Date                     Value                 11/01/2017               0.97             ----------  Body mass index is 21.74 kg/(m^2).     Needed:  No  Language:  English    Patient will be contacted to schedule procedure.     Please be aware that coverage of these services is subject to the terms and limitations of your health insurance plan.  Call member services at your health plan with any benefit or coverage questions.  Any procedures must be performed at a [place] facility OR coordinated by your clinic's referral office.    Please bring the following with you to your appointment:    (1) Any X-Rays, CTs or MRIs which have been performed.  Contact the facility where they were done to arrange for  prior to your scheduled appointment.     (2) List of current medications   (3) This referral request   (4) Any documents/labs given to you for this referral            Home infusion referral       Your provider has referred you to: [facility]: [facility] [place] [phone]   [email or web address]    Local Address (if different from home address): N/A    Anticipated Length of Therapy: 2 week course: start date was 10/31/17    Ceftriaxone 2 Gm IV every 24 hours    Home Infusion Pharmacist to adjust therapy based on labs and clinical assessments: Yes    Labs:  May draw labs from Venous Catheter: Yes  Home Infusion Pharmacist to order labs based on therapy type and clinical assessments: Yes  Call/Fax Lab Results to:     Agency Staff to assess nursing needs for Infusion Therapy.    Access Device Management:  IV Access Type: midline  Flush with Heparin and Normal Saline IVP PRN and routine site care (per agency protocol) to maintain access device? Yes            Medication Therapy Management Referral       Reason for referral:  on more than 10 medications and hospitalized or in the ED in the past 6 months  and takes rifaxamin or lactulose     This service is designed to help you get the most from your medications.  A specially trained pharmacist will work closely with you and your doctors  to solve any problems related to your medications and to help you get the   best results from taking them.      The Medication Therapy Management staff will call you to schedule an appointment.                  Further instructions from your care team       [facility]  Phone  [phone]  Fax  [phone]      Pending Results     Date and Time Order Name Status Description    10/31/2017 0453 Blood culture Preliminary     10/31/2017 0453 Blood culture Preliminary     10/30/2017 1941 Fluid Culture Aerobic Bacterial Preliminary     10/30/2017 1915 Blood culture Preliminary     10/30/2017 1915 Blood culture  "Preliminary     10/30/2017 1315 Blood culture Preliminary             Statement of Approval     Ordered          11/03/17 1206  I have reviewed and agree with all the recommendations and orders detailed in this document.  EFFECTIVE NOW     Approved and electronically signed by:  Precious Luke MD             Admission Information     Date & Time Provider Department Dept. Phone    10/30/2017 Eric May MD [address] [facility] [phone]      Your Vitals Were     Blood Pressure Pulse Temperature Respirations Height Weight    111/63 (BP Location: Right arm) 82 97.5  F (36.4  C) (Oral) 16 1.778 m (5' 10\") 72.1 kg (158 lb 14.4 oz)    Pulse Oximetry BMI (Body Mass Index)                99% 22.8 kg/m2          [facility] Information     [facility] gives you secure access to your electronic health record. If you see a primary care provider, you can also send messages to your care team and make appointments. If you have questions, please call your primary care clinic.  If you do not have a primary care provider, please call [phone] and they will assist you.        Care EveryWhere ID     This is your Care EveryWhere ID. This could be used by other organizations to access your [place] medical records  [id number]        Equal Access to Services     KAITLYN CAMACHO AH: Hadii linnea melendezo Sobud, waaxda luqadaha, qaybta kaalmada adeegyada, jenny molina. So [facility] [phone].    ATENCIÓN: Si habla español, tiene a lee disposición servicios gratuitos de asistencia lingüística. Llame al [phone].    We comply with applicable federal civil rights laws and [place] laws. We do not discriminate on the basis of race, color, national origin, age, disability, sex, sexual orientation, or gender identity.               Review of your medicines      START taking        Dose / Directions    cefTRIAXone 2 GM vial   Commonly known as:  ROCEPHIN   Indication:  Infection Within the Abdomen "   Used for:  Bacteremia        Dose:  2 g   Inject 2 g into the vein every 24 hours   Quantity:  10 each   Refills:  0       ciprofloxacin 500 MG tablet   Commonly known as:  CIPRO   Used for:  SBP (spontaneous bacterial peritonitis) (H)        Dose:  500 mg   Take 1 tablet (500 mg) by mouth daily Start taking after you complete course of IV ceftriaxone.   Quantity:  30 tablet   Refills:  0       furosemide 20 MG tablet   Commonly known as:  LASIX   Used for:  Alcoholic cirrhosis of liver with ascites (H)        Dose:  20 mg   Take 1 tablet (20 mg) by mouth daily   Quantity:  30 tablet   Refills:  0       pantoprazole 40 MG EC tablet   Commonly known as:  PROTONIX   Used for:  Secondary esophageal varices with bleeding (H)        Dose:  40 mg   Take 1 tablet (40 mg) by mouth daily   Quantity:  90 tablet   Refills:  0         CONTINUE these medicines which may have CHANGED, or have new prescriptions. If we are uncertain of the size of tablets/capsules you have at home, strength may be listed as something that might have changed.        Dose / Directions    spironolactone 100 MG tablet   Commonly known as:  ALDACTONE   This may have changed:  how much to take   Used for:  Alcoholic cirrhosis of liver with ascites (H)        Dose:  50 mg   Take 0.5 tablets (50 mg) by mouth daily   Quantity:  30 tablet   Refills:  0         CONTINUE these medicines which have NOT CHANGED        Dose / Directions    lactulose 10 GM/15ML solution   Commonly known as:  CHRONULAC        Dose:  15 g   Take 15 g by mouth 2 times daily   Refills:  0       MULTIPLE VITAMINS PO        Dose:  1 tablet   Take 1 tablet by mouth daily   Refills:  0       SILDENAFIL CITRATE PO        Dose:  10 mg   Take 10 mg by mouth daily   Refills:  0       TYLENOL PO        Take by mouth every 4 hours as needed for mild pain or fever   Refills:  0       VITAMIN B6 PO        Take by mouth daily   Refills:  0            Where to get your medicines      These  medications were sent to [facility] - [place], [place] - [address]  [address], [facility] [address]     Phone:  [phone]     ciprofloxacin 500 MG tablet    furosemide 20 MG tablet    pantoprazole 40 MG EC tablet    spironolactone 100 MG tablet         Some of these will need a paper prescription and others can be bought over the counter. Ask your nurse if you have questions.     You don't need a prescription for these medications     cefTRIAXone 2 GM vial               ANTIBIOTIC INSTRUCTION     You've Been Prescribed an Antibiotic - Now What?  Your healthcare team thinks that you or your loved one might have an infection. Some infections can be treated with antibiotics, which are powerful, life-saving drugs. Like all medications, antibiotics have side effects and should only be used when necessary. There are some important things you should know about your antibiotic treatment.      Your healthcare team may run tests before you start taking an antibiotic.    Your team may take samples (e.g., from your blood, urine or other areas) to run tests to look for bacteria. These test can be important to determine if you need an antibiotic at all and, if you do, which antibiotic will work best.      Within a few days, your healthcare team might change or even stop your antibiotic.    Your team may start you on an antibiotic while they are working to find out what is making you sick.    Your team might change your antibiotic because test results show that a different antibiotic would be better to treat your infection.    In some cases, once your team has more information, they learn that you do not need an antibiotic at all. They may find out that you don't have an infection, or that the antibiotic you're taking won't work against your infection. For example, an infection caused by a virus can't be treated with antibiotics. Staying on an antibiotic when you don't need it is more likely  to be harmful than helpful.      You may experience side effects from your antibiotic.    Like all medications, antibiotics have side effects. Some of these can be serious.    Let you healthcare team know if you have any known allergies when you are admitted to the hospital.    One significant side effect of nearly all antibiotics is the risk of severe and sometimes deadly diarrhea caused by Clostridium difficile (C. Difficile). This occurs when a person takes antibiotics because some good germs are destroyed. Antibiotic use allows C. diificile to take over, putting patients at high risk for this serious infection.    As a patient or caregiver, it is important to understand your or your loved one's antibiotic treatment. It is especially important for caregivers to speak up when patients can't speak for themselves. Here are some important questions to ask your healthcare team.    What infection is this antibiotic treating and how do you know I have that infection?    What side effects might occur from this antibiotic?    How long will I need to take this antibiotic?    Is it safe to take this antibiotic with other medications or supplements (e.g., vitamins) that I am taking?     Are there any special directions I need to know about taking this antibiotic? For example, should I take it with food?    How will I be monitored to know whether my infection is responding to the antibiotic?    What tests may help to make sure the right antibiotic is prescribed for me?      Information provided by:  www.cdc.gov/getsmart  U.S. Department of Health and Human Services  Centers for disease Control and Prevention  National Center for Emerging and Zoonotic Infectious Diseases  Division of Healthcare Quality Promotion         Protect others around you: Learn how to safely use, store and throw away your medicines at www.disposemymeds.org.             Medication List: This is a list of all your medications and when to take them. Check  marks below indicate your daily home schedule. Keep this list as a reference.      Medications           Morning Afternoon Evening Bedtime As Needed    cefTRIAXone 2 GM vial   Commonly known as:  ROCEPHIN   Inject 2 g into the vein every 24 hours   Last time this was given:  2 g on 11/3/2017  3:36 PM                                ciprofloxacin 500 MG tablet   Commonly known as:  CIPRO   Take 1 tablet (500 mg) by mouth daily Start taking after you complete course of IV ceftriaxone.                                furosemide 20 MG tablet   Commonly known as:  LASIX   Take 1 tablet (20 mg) by mouth daily                                lactulose 10 GM/15ML solution   Commonly known as:  CHRONULAC   Take 15 g by mouth 2 times daily                                MULTIPLE VITAMINS PO   Take 1 tablet by mouth daily                                pantoprazole 40 MG EC tablet   Commonly known as:  PROTONIX   Take 1 tablet (40 mg) by mouth daily   Last time this was given:  40 mg on 11/3/2017  8:17 AM                                SILDENAFIL CITRATE PO   Take 10 mg by mouth daily                                spironolactone 100 MG tablet   Commonly known as:  ALDACTONE   Take 0.5 tablets (50 mg) by mouth daily                                TYLENOL PO   Take by mouth every 4 hours as needed for mild pain or fever   Last time this was given:  650 mg on 11/1/2017  7:38 PM                                VITAMIN B6 PO   Take by mouth daily

## 2017-10-30 NOTE — DISCHARGE INSTRUCTIONS
Follow up with primary doctors as planned.        Paracentesis  Your healthcare provider recommends that you have paracentesis. This is a procedure to remove extra fluid from your belly (abdomen). A needle is used to drain the fluid. A small sample of fluid may be taken and tested for problems. If the fluid buildup is causing discomfort or pain, all of the fluid may be drained. To do this, a tube is attached to the needle. The fluid is drained into a container that sits outside of the body. If symptoms are severe, paracentesis may be done as an emergency procedure. Otherwise, it will be scheduled ahead of time. Read on to learn more about paracentesis and how it works.     Understanding ascites  Many of the body s organs, including the liver and intestines, are inside the belly (abdomen). The organs are covered in a thin membrane called the peritoneum. The peritoneum has 2 layers. It makes a fluid that allows the layers to glide smoothly past each other. If this fluid builds up in the belly, the condition is called ascites. Ascites causes pain and discomfort. It can also make it hard to breathe. Fluid can build up for a number of reasons. These include chronic liver disease (cirrhosis), heart or kidney failure, and cancer. Your provider can tell you more about the cause of your ascites.  How paracentesis works  The goal of paracentesis may be to help diagnose the cause of the excess fluid. Or, the goal may be to drain excess fluid from the abdomen. In some cases, fluid returns and the procedure needs to be repeated.  Before the procedure    Tell your provider about any medicines you are taking. This includes all prescription medicines, over-the-counter medicines, street drugs, herbs, vitamins, and other supplements.    Tell your provider about any allergies you have.    Before the procedure begins, you ll be asked to empty your bladder. This helps prevent injury to the bladder during the procedure. If needed, a thin  tube (Blue catheter) may be placed into your bladder to drain urine during the procedure. This tube is removed after the procedure.    An IV (intravenous) line may be put into a vein in your arm or hand. This line supplies fluids and medicines.  During the procedure    You are awake during the procedure.    An imaging method called ultrasound may be used to guide the procedure. It shows live images of the inside of your belly on a video screen. This helps the provider find the site of the excess fluid inside your belly and decide where to insert the needle.    A numbing medicine (local anesthesia) is injected into your belly where the needle will be inserted.    Once the skin is numb, the provider carefully inserts the needle into the belly. This causes the needle to fill with fluid.    The needle may be removed with only a small sample of fluid. This sample is sent to a lab for testing. Getting a sample takes about 10 to 15 minutes.    Or, a tube may be attached to the needle so that more of the excess fluid can be drained. The tube may be taped or stitched into place. This keeps it from pulling the needle out of your belly.    How long it takes to drain all of the fluid varies for each person. In most cases, it takes about 30 minutes. Your provider will let you know if the procedure is expected to take longer than usual.    Once all of the fluid is drained, the needle and tube are removed.    Pressure is put on the puncture site to stop any fluid leakage or bleeding.    A small bandage is placed over the puncture site. Albumin may be given during or after the procedure to prevent low blood pressure or kidney problems.  After the procedure  You may be taken to a recovery room to rest after the procedure. If you are in pain, you will be given medicine as needed. You will likely be sent home 1 to 2 hours after the procedure is done. When you leave the hospital, have an adult family member or friend drive you home.  If you are staying in the hospital, you will return to your hospital room.  Risks and possible complications of paracentesis  This procedure is considered safe. But like all procedures, it carries some risks. These include the following:    Bleeding    Infection    Injury to structures in the belly    Fast drop in blood pressure   Recovering at home    If needed, your provider can prescribe or recommend pain medicines for you to take at home. Take these exactly as directed. If you stopped taking other medicines before the procedure, ask your provider when you can start them again.    You may remove the bandage 24 hours after the procedure.    Take it easy for 24 hours after the procedure. Avoid physical activity until your provider says it s OK.  Follow-up care  Make a follow-up appointment with your provider as directed. During your follow-up visit, your provider will check your healing. Let your provider know how you are feeling. You can also discuss the cause of your ascites and if any more treatment is needed.   When to seek medical care  Call your healthcare provider if you notice any of the following after the procedure:    A fever of 100.4 F (38.0 C) or higher    Trouble breathing    Pain that does not go away even after taking pain medicine    Belly pain not caused by having the skin punctured    Bleeding from the puncture site    More than a small amount of fluid leakage from the puncture site    Swollen belly    Signs of infection at the puncture site. These include increased pain, redness, or swelling, as well as warmth or bad-smelling drainage.    Blood in your urine    Dizziness, lightheadedness, or fainting   Date Last Reviewed: 7/1/2016 2000-2017 [employer]. [address] [place] [address], [place] [id number]. All rights reserved. This information is not intended as a substitute for professional medical care. Always follow your healthcare professional's instructions.

## 2017-10-30 NOTE — ED PROVIDER NOTES
History     Chief Complaint   Patient presents with     Leg Pain     pt seen 0100 this am in ED and had fluid drained from abdomen.   pt now has left leg pain with diarrhea.       EMERY Bell is a 52 year old male with a history of alcoholic cirrhosis with ascites who presents to the Emergency Department for concerns of left leg pain and red bloody diarrhea. Patient was seen in the ED early this morning and had a paracentisis of approximately 9 L. This morning patient developed left posterior leg pain, behind the knee. Patient reports no history of blood clots. He notes a 2-3 week history of cough and congestion. He denies fever at home, but is febrile on examination. He denies sick contacts. Patient also developed red bloody diarrhea this morning. His last bowel movement was at noon, which was red and bloody. Patient restarted lactulose 30 g daily but cut back this morning 15 g due to diarrhea. Patients endoscopy in March 2016 showed small esophageal varicies.     Social History: Lives in [place], [place].  Here in ED with his wife Elicia by private car.     Past Medical History:  Past Medical History:   Diagnosis Date     Cirrhosis (H)      Hepatitis      Hypertension      Left calcaneus fracture 1/9/2006 January 16, 2006: Fell 10 feet from ladder onto left foot on frozen ground on 1/9/06 at home.  Immediate pain and unable to walk- seen at [place] and diagnosed with calcaneus fracture       Medications:  Current Outpatient Prescriptions   Medication Sig Dispense Refill     lactulose (CHRONULAC) 10 GM/15ML solution Take 15 g by mouth 2 times daily        Acetaminophen (TYLENOL PO) Take by mouth every 4 hours as needed for mild pain or fever       spironolactone (ALDACTONE) 100 MG tablet Take 1 tablet (100 mg) by mouth daily 30 tablet 3     UNABLE TO FIND Take 1 capsule by mouth every evening MEDICATION NAME: P6 (energy booster from [facility])       MULTIPLE VITAMINS PO Take 1 tablet by mouth daily       sildenafil  (REVATIO/VIAGRA) 20 MG tablet Take 1 tablet (20 mg) by mouth 3 times daily Take 1-3 po daily prn 90 tablet 3       Allergies:     Allergies   Allergen Reactions     Benadryl [Diphenhydramine] Other (See Comments)     Delirium (visual and auditory hallucinations)       Review of Systems   Constitutional: Positive for fever.   HENT: Negative.    Eyes: Negative.    Respiratory: Negative.    Cardiovascular: Negative.    Gastrointestinal: Positive for abdominal distention, abdominal pain, blood in stool and diarrhea.   Endocrine: Negative.    Genitourinary: Negative.    Musculoskeletal:        Left posterior thigh pain, redness and warmth   Skin: Negative.    Allergic/Immunologic: Negative.    Neurological: Negative.    Hematological: Negative.    Psychiatric/Behavioral: Negative.        Physical Exam   BP: 121/59  Pulse: 102  Heart Rate: 133  Temp: 101.2  F (38.4  C)  Resp: 18  Weight: 71.7 kg (158 lb)  SpO2: 100 %      Physical Exam   Constitutional: He appears well-developed and well-nourished. He appears ill. No distress.   HENT:   Head: Normocephalic and atraumatic.   Eyes: Conjunctivae are normal. Pupils are equal, round, and reactive to light.   Neck: Normal range of motion. Neck supple. No JVD present. No tracheal deviation present. No thyromegaly present.   Cardiovascular: Tachycardia present.    Pulmonary/Chest: Effort normal and breath sounds normal. No stridor. Tachypnea noted. No respiratory distress. He has no wheezes. He has no rales.   Abdominal: He exhibits distension (with leak of ascitic fluid from post-puncture wound after paracentesis). There is tenderness.       Musculoskeletal:        Right shoulder: He exhibits tenderness and pain.        Left knee: He exhibits erythema. He exhibits no deformity and no laceration. Tenderness found.        Legs:  Lymphadenopathy:     He has no cervical adenopathy.   Skin: Skin is warm. He is not diaphoretic. There is pallor.       ED Course     ED Course      Procedures               Critical Care time:  none     The patient has signs of Severe Sepsis as evidenced by:    1. 2 SIRS criteria, AND  2. Suspected infection, AND   3. Organ dysfunction: Lactic Acid >2    Time severe sepsis diagnosis confirmed on ED arrival as this was the time when Lactate resulted, and the level was >2      3 Hour Severe Sepsis Bundle Completion:  1. Initial Lactic Acid Result:   Recent Labs   Lab Test  10/30/17   1540  10/30/17   1345  03/09/16   1238   LACT  2.3*  3.3*  2.4*     2. Blood Cultures before Antibiotics: Yes  3. Broad Spectrum Antibiotics Administered: Yes     Anti-infectives (Future)    Start     Dose/Rate Route Frequency Ordered Stop    10/30/17 1400  cefTRIAXone IN D5W (ROCEPHIN) intermittent infusion 2 g      2 g  over 30 Minutes Intravenous EVERY 24 HOURS 10/30/17 1352          4. 2000 ml of IV fluids.    the patient was transferred out of the ED prior to the 6 hour jennifer.              ED Medications:  Medications   lidocaine 1 % 1 mL (not administered)   lidocaine (LMX4) kit (not administered)   sodium chloride (PF) 0.9% PF flush 3 mL (not administered)   sodium chloride (PF) 0.9% PF flush 3 mL (not administered)   cefTRIAXone IN D5W (ROCEPHIN) intermittent infusion 2 g (0 g Intravenous Stopped 10/30/17 1452)   0.9% sodium chloride infusion (1,000 mLs Intravenous New Bag 10/30/17 1736)   ondansetron (ZOFRAN) injection 4 mg (not administered)   octreotide (sandoSTATIN) injection 50 mcg (50 mcg Intravenous Given 10/30/17 1730)   0.9% sodium chloride BOLUS (0 mLs Intravenous Stopped 10/30/17 1453)   acetaminophen (TYLENOL) tablet 1,000 mg (1,000 mg Oral Given 10/30/17 1342)   0.9% sodium chloride BOLUS (0 mLs Intravenous Stopped 10/30/17 1736)   iopamidol (ISOVUE-370) solution 77 mL (77 mLs Intravenous Given 10/30/17 1516)   sodium chloride 0.9 % bag 500mL for CT scan flush use (59 mLs As instructed Given 10/30/17 1517)   HYDROmorphone (PF) (DILAUDID) injection 0.5 mg (0.5  mg Intravenous Given 10/30/17 1701)   octreotide (sandoSTATIN) 1,250 mcg in NaCl 0.9 % 250 mL (50 mcg/hr Intravenous New Bag 10/30/17 5070)         ED Labs and Imaging:  Results for orders placed or performed during the hospital encounter of 10/30/17 (from the past 24 hour(s))   Comprehensive metabolic panel   Result Value Ref Range    Sodium 133 133 - 144 mmol/L    Potassium 4.4 3.4 - 5.3 mmol/L    Chloride 104 94 - 109 mmol/L    Carbon Dioxide 21 20 - 32 mmol/L    Anion Gap 8 3 - 14 mmol/L    Glucose 127 (H) 70 - 99 mg/dL    Urea Nitrogen 25 7 - 30 mg/dL    Creatinine 1.11 0.66 - 1.25 mg/dL    GFR Estimate 69 >60 mL/min/1.7m2    GFR Estimate If Black 83 >60 mL/min/1.7m2    Calcium 7.9 (L) 8.5 - 10.1 mg/dL    Bilirubin Total 2.3 (H) 0.2 - 1.3 mg/dL    Albumin 2.7 (L) 3.4 - 5.0 g/dL    Protein Total 6.0 (L) 6.8 - 8.8 g/dL    Alkaline Phosphatase 163 (H) 40 - 150 U/L    ALT 17 0 - 70 U/L    AST 29 0 - 45 U/L   CBC with platelets differential   Result Value Ref Range    WBC 8.5 4.0 - 11.0 10e9/L    RBC Count 2.47 (L) 4.4 - 5.9 10e12/L    Hemoglobin 6.5 (LL) 13.3 - 17.7 g/dL    Hematocrit 20.9 (L) 40.0 - 53.0 %    MCV 85 78 - 100 fl    MCH 26.3 (L) 26.5 - 33.0 pg    MCHC 31.1 (L) 31.5 - 36.5 g/dL    RDW 17.2 (H) 10.0 - 15.0 %    Platelet Count 120 (L) 150 - 450 10e9/L    Diff Method Automated Method     % Neutrophils 88.3 %    % Lymphocytes 6.6 %    % Monocytes 5.1 %    Absolute Neutrophil 7.4 1.6 - 8.3 10e9/L    Absolute Lymphocytes 0.6 (L) 0.8 - 5.3 10e9/L    Absolute Monocytes 0.4 0.0 - 1.3 10e9/L   Lactic acid whole blood   Result Value Ref Range    Lactic Acid 3.3 (H) 0.7 - 2.0 mmol/L   ABO/Rh type and screen   Result Value Ref Range    Units Ordered 2     ABO A     RH(D) Neg     Antibody Screen Neg     Test Valid Only At [facility]        Specimen Expires 11/02/2017     Crossmatch Red Blood Cells    Blood component   Result Value Ref Range    Unit Number [id number]     Blood Component Type Red  Blood Cells Leukocyte Reduced     Division Number 00     Status of Unit Released to care unit 10/30/2017 1625     Blood Product Code [id number]     Unit Status ISS    Blood component   Result Value Ref Range    Unit Number [id number]     Blood Component Type Red Blood Cells Leukocyte Reduced     Division Number 00     Status of Unit Ready for patient 10/30/2017 1603     Blood Product Code [id number]     Unit Status CON    US Lower Extremity Venous Duplex Left    Narrative    VENOUS ULTRASOUND LEFT LEG  10/30/2017 2:38 PM     HISTORY: Evaluate for DVT versus superficial thrombophlebitis.,  History of alcoholic cirrhosis, status post paracentesis  therapeutically.  Fever, leg pain    COMPARISON: None.    FINDINGS:  Examination of the deep veins with graded compression and  color flow Doppler with spectral wave form analysis was performed.      Impression    IMPRESSION: No evidence of deep venous thrombosis.  Probable ascites  in the right inguinal region.    TRE MARTINEZ MD   CT Abdomen Pelvis w Contrast    Narrative    CT ABDOMEN AND PELVIS WITH CONTRAST   10/30/2017 3:33 PM     HISTORY: Left lower quadrant abdominal pain. Fever, bloody stools.  Status post paracentesis. Evaluate for ischemic versus other acute  process.    TECHNIQUE:   CT of the abdomen and pelvis was performed following the  administration of 77 mL Isovue 370. Radiation dose for this scan was  reduced using automated exposure control, adjustment of the mA and/or  kV according to patient size, or iterative reconstruction technique.    COMPARISON: CT of the abdomen and pelvis dated 2/25/2016.    FINDINGS: The colon appears grossly unremarkable. There is a small  hiatal hernia.    The liver is atrophic with nodular contour consistent with cirrhosis.  There appears to be thrombosis of the left portal vein and anterior  branch of the right portal vein. There is nonocclusive thrombus in the  main portal vein to its junction with the superior  mesenteric vein.  These findings are new. There are distal esophageal varices.    The spleen is enlarged with a maximum length of approximately 16 cm  with the upper limits of normal being 13 mm. There is a new  wedge-shaped area of decreased perfusion in the anterosuperior  portions of the spleen most consistent with an infarct.    There are multiple nonobstructing stones in the kidneys bilaterally.  There also appears to be a 5 mm stone in the mid distal left ureter.  No significant left hydroureter is noted.    The pancreas and adrenal glands appear unremarkable.    There are multiple gallstones.    There is a moderate amount of ascites in the abdomen and pelvis.      Impression    IMPRESSION:  1. Cirrhosis of the liver. Thrombosis of the left portal vein and  anterior branch of the right portal vein. Nonocclusive thrombus is  noted in the main portal vein as above. Distal esophageal varices.  2. Splenomegaly with evidence of a new infarct involving the  anterosuperior spleen.  3. Moderate amount of ascites.  4. Nephrolithiasis bilaterally. There also appears to be a stone in  the mid distal left ureter. No significant left hydroureter or  hydronephrosis.  5. Gallstones.    TRE MARTINEZ MD   Lactic acid whole blood   Result Value Ref Range    Lactic Acid 2.3 (H) 0.7 - 2.0 mmol/L       ED Vitals:  Vitals:    10/30/17 1645 10/30/17 1700 10/30/17 1715 10/30/17 1730   BP: 102/49 102/49 98/48 105/57   BP Location:       Pulse:       Resp: 22 25 22 24   Temp:  100.4  F (38  C)     TempSrc:  Oral     SpO2:       Weight:         Vitals:    10/30/17 1645 10/30/17 1700 10/30/17 1715 10/30/17 1730   BP: 102/49 102/49 98/48 105/57   BP Location:       Pulse:       Resp: 22 25 22 24   Temp:  100.4  F (38  C)     TempSrc:  Oral     SpO2:       Weight:             Previous records reviewed:   Colonoscopy 8/4/16  Findings:        A sessile polyp was found in the cecum. The polyp was 15 mm in size. The        polyp was  removed with a hot snare. Resection and retrieval were        complete. Verification of patient identification for the specimen was        done by the physician using the patient's name and medical record number.        A single small-mouthed diverticulum was found in the sigmoid colon.                                                                                     Impression:          - One 15 mm polyp in the cecum. Resected and retrieved.                        - Diverticulosis in the sigmoid colon.   Recommendation:      - Repeat colonoscopy in 1 year to review the polypectomy                        site.                                                                                       electronically signed by Rianna Be   ______________________   Rianna Be MD   8/4/2016 2:38 PM   I was physically present for the entire viewing portion of the exam.     Upper endoscopy 3/31/2016  Findings:        Small (< 5 mm) varices were found in the lower third of the esophagus.        Moderate portal hypertensive gastropathy was found in the entire        examined stomach.        The duodenal bulb, first part of the duodenum and 2nd part of the        duodenum were normal.                                                                                     Impression:          - Small (< 5 mm) esophageal varices.                        - Portal hypertensive gastropathy.                        - Normal duodenal bulb, first part of the duodenum and                        2nd part of the duodenum.   Recommendation:      - Perform a colonoscopy today.                                                                                       electronically signed by JAKE Uriostegui   _____________________   Rhys Uriostegui MD   3/31/2016 10:49 AM   I was physically present for the entire viewing portion of the exam.       Assessments & Plan (with Medical Decision Making)   Clinical Impression: Pleasant 52-year-old male  who presented for evaluation for fever, leg pain,  and diarrhea after paracentesis completed early this morning in the emergency department.  Patient has sepsis felt to be secondary to intra-abdominal process with new portal vein thrombosis and splenic infarct.  He may also have a GI bleed cannot exclude variceal bleeding vs possible infected mid distal left ureteral stone with left lower quadrant abdominal pain.  Patient has a history of alcoholic cirrhosis and is reported to have been sober  since February 2016.  Patient reports since his ED visit early this morning after his therapeutic paracentesis he developed a fever.  He reports he continues to leak around his paracentesis site, overlying the puncture wound.  He also reports he has had diarrhea which is likely related to his lactulose use.  He did change his lactulose dose from 30 mg daily to 15 mg daily today.  He reports a cough ×2-3 weeks and reports he has had bloody stools this afternoon.  Review of his medical records indicate the patient was found to have a small esophageal varices during his last upper endoscopy from March 2016.  He also does have known portal hypertensive gastropathy.  Patient appears ill on exam.  He is warm to touch, leaking ascitic fluid over the left abdomen that is distended with some localized tenderness.  He has some streaking redness over the posterior left thigh.  He is alert and oriented.  He is tachycardic at rest but normotensive.       ED Course and Plan:   I reviewed his medical records including his ED visit earlier this morning with Dr. Shahid Miller who completed his emergent paracentesis for therapeutic purposes given patient was symptomatic with abdominal distention and shortness of breath.  Labs were obtained given his fever with temperature 101.2F here in the ED and tachycardia.  Blood work was obtained.  His medical record and procedure note by Dr. Miller indicates patient had a 8.6 L of ascites drained.  At this  time given concern for post-paracentesis lower abdominal pain with fever patient was started empirically on IV Rocephin after blood cultures were obtained.  A CT of his abdomen was obtained to exclude any secondary complications post-paracentesis completed for therapeutic purposes in the emergency department.   CT today does not show any acute hemorrhage.  There is however what appears to be an acute portal vein thrombosis in the left, and anterior branch of the right portal vein there is a nonocclusive thrombus noted in the main portal vein.  Distal esophageal varices noted with splenomegaly with evidence of new infarct involving the anterior superior spleen.  Moderate amount of ascites is also noted. There is also probable mid distal left ureteral stone.  See detailed report above.  An ultrasound of his left lower leg was obtained given lower posterior thigh redness and localized tenderness to exclude DVT versus superficial thrombophlebitis.  With his history of a small esophageal varices and report of bright red blood per rectum after a bowel movement this morning patient will likely need to be transferred for additional care given possibility of variceal bleeding although he is not hypotensive.Patient is followed by  Dr. Bales by report. He will benefit from transfer to [place] for further care and evaluation including consideration of repeat upper endoscopy and colonoscopy.    I reviewed his presentation with Dr. ROBISON- triage hospitalist at the [facility] who agreed to assume care on transfer.  Hemoglobin this morning prior to his therapeutic paracentesis in the ED was 8.8, hematocrit was 27.2. Hemoglobin this afternoon is 6.5.  Lactate is 3.3.  Type and screen completed.  2 units of blood ordered, consent signed.  Repeat lactate 2.3 after fluids.. He was also not heparinized for his portal vein thrombosis.  A figure of 8 stitch was placed over his post-paracentesis puncture  Wound (from earlier in the  morning) over the left lower abdomen as he continued to have leaking from his paracentesis this morning.  The skin was prepped with Betadine.  Anesthesia with 2 cc of 0.5% bupivacaine with epinephrine.  Leak from the paracentesis puncture wound stopped.  He was started on IV octreotide bolus with drip.  Patient is transferred for further care including consideration of care by GI/Hepatology and Urology. Plan of care reviewed with patient and spouse.      Critical care 60 minutes for resuscitation for sepsis felt to be secondary to intra-abdominal process including portal vein thrombosis, splenic infarct and possible mid distal uretheral stone as well as coordination of care and consultation.        Disclaimer: This note consists of symbols derived from keyboarding, dictation and/or voice recognition software. As a result, there may be errors in the script that have gone undetected. Please consider this when interpreting information found in this chart.    I have reviewed the nursing notes.    I have reviewed the findings, diagnosis, plan and need for follow up with the patient.       New Prescriptions    No medications on file       Final diagnoses:   Alcoholic cirrhosis of liver with ascites (H) - s/p therapeutic paracentesis this morning   Blood in stool   Esophageal varices without bleeding, unspecified esophageal varices type (H) - Less than 5 mm varices noted on upper endoscopy from March 2016.   Portal hypertensive gastropathy   Diarrhea, unspecified type - Likely medication related given recent dose change of lactulose   PVT (portal vein thrombosis)   Splenic infarct   Ureteral stone - nephrolithiasis with possible left mid distal uretheral stone     This document serves as a record of the services and decisions personally performed and made by Darrel Brandon. The HPI was created on his behalf by Ayah Turk, a trained medical scribe. The creation of this document is based on the provider's  statements to the medical scribe.     Ayah Turk 1:10 PM October 30, 2017    Provider:   The information in this document created by the medical scribe for me, accurately reflects the services I personally performed and the decisions made by me. I have reviewed and approved this document for accuracy prior to leaving the patient care area. Darrel Brandon, 1:10 PM October 30, 2017      10/30/2017   [facility] EMERGENCY DEPARTMENT      Darrel Brandon MD  10/30/17 [id number]

## 2017-10-30 NOTE — ED NOTES
Paracentesis complete with 8.6L out. Pt tolerated procedure well. Pt reports decreased abdominal discomfort and improvement in breathing.

## 2017-10-30 NOTE — ED PROVIDER NOTES
History     Chief Complaint   Patient presents with     Abdominal Pain     since 1200, pt is due to have abdomin drainged at 3pm 10/30;      Shortness of Breath     HPI  Ivan Bell is a 52 year old male who has past medical history significant for alcoholic cirrhosis with ascites, who recently had a paracentesis draining 9.8 L of fluid on 10/11/2017.  I reviewed a recent primary care visit with Dr. Velez.  Patient had been feeling better, however he states that his abdomen has been increasingly distended over the recent past.  He describes increasing amounts of shortness of breath, worsened over the past couple days, causing him to not be able to sleep this evening.  Therefore, he presents to the emergency department with his wife.  Patient is on spironolactone currently.  He was unable to take furosemide secondary to previous episodes of hypokalemia taking that medication.  Patient denies fever.  He has had slight cough, with nasal congestion.  Denies urinary symptoms.  He did also start lactulose on Friday, 2 days ago.  He has had frequent stooling secondary to the lactulose.  No other changes in health.  He does state he has been working lots, and therefore his appetite has been less, and he has been less able to eat good meals.  No alcohol use since February, 2016.  he has GI appointment in approximately 8 days.      Problem List:    Patient Active Problem List    Diagnosis Date Noted     Alcoholic cirrhosis of liver with ascites (H) 03/08/2016     Priority: Medium     Hallucinations, visual 02/28/2016     Priority: Medium     Peroneal muscular atrophy 01/02/2014     Priority: Medium     January 2, 2014 right leg, EMG done.        Hypertension goal BP (blood pressure) < 140/90 12/18/2012     Priority: Medium     24 hour contact given to patient  01/02/2012     Priority: Medium     EMERGENCY CARE PLAN  Presenting Problem Signs and Symptoms Treatment Plan    Questions or conerns during clinic hours    I will  call the clinic directly     Questions or conerns outside clinic hours    I will call the 24 hour nurse line at [phone]    Patient needs to schedule an appointment    I will call the 24 hour scheduling team at [phone] or clinic directly    Same day treatment     I will call the clinic first, nurse line if after hours, urgent care and express care if needed                                 CARDIOVASCULAR SCREENING; LDL GOAL LESS THAN 130 10/31/2010     Priority: Medium     Injury, hand 11/21/2008     Priority: Medium     January 2, 2009: Tendon injury 2nd  extensor tendon.  Crush injury with residual significant deficit.  Ortho Hand consult       Erectile dysfunction 01/29/2008     Priority: Medium     Gouty arthropathy 12/31/2006     Priority: Medium     Problem list name updated by automated process. Provider to review and confirm  [employer] Update utility       Hypertriglyceridemia 08/03/2005     Priority: Medium     Last Exam: December 19, 2007  Last Lipids: CHOL      211   01/25/2007 LDL      104   01/25/2007 HDL       83   01/25/2007  Last LFTs:: AST      106   01/25/2007   ALT       53   01/25/2007    TRIG      120   01/25/2007  TRIG      115   01/16/2006  Meds: Lopid 600 bid - tolerating          Past Medical History:    Past Medical History:   Diagnosis Date     Cirrhosis (H)      Hepatitis      Hypertension      Left calcaneus fracture 1/9/2006       Past Surgical History:    Past Surgical History:   Procedure Laterality Date     ANKLE SURGERY Left      COLONOSCOPY N/A 3/31/2016    Procedure: COLONOSCOPY;  Surgeon: Rhys Uriostegui MD;  Location:  GI     ESOPHAGOSCOPY, GASTROSCOPY, DUODENOSCOPY (EGD), COMBINED N/A 3/31/2016    Procedure: COMBINED ESOPHAGOSCOPY, GASTROSCOPY, DUODENOSCOPY (EGD);  Surgeon: Rhys Uriostegui MD;  Location:  GI     KNEE SURGERY Left      KNEE SURGERY Right        Family History:    Family History   Problem Relation Age of Onset     Family History  "Negative Mother      Family History Negative Father      Hypertension Father      CEREBROVASCULAR DISEASE Father 87     Breast Cancer Maternal Grandmother      Rheumatoid Arthritis Daughter      Depression Daughter      Cancer - colorectal No family hx of      Prostate Cancer No family hx of      Liver Disease No family hx of        Social History:  Marital Status:   [2]  Social History   Substance Use Topics     Smoking status: Former Smoker     Types: Dip, chew, snus or snuff     Smokeless tobacco: Current User      Comment: 1 tin per 10 days.     Alcohol use No      Comment: last etoh 2/14/16        Medications:      lactulose (CHRONULAC) 10 GM/15ML solution   Acetaminophen (TYLENOL PO)   spironolactone (ALDACTONE) 100 MG tablet   MULTIPLE VITAMINS PO   sildenafil (REVATIO/VIAGRA) 20 MG tablet   UNABLE TO FIND   triamcinolone (KENALOG) 0.1 % cream         Review of Systems   Constitutional: Negative for fever.   Respiratory: Positive for shortness of breath.    Cardiovascular: Negative for chest pain.   Gastrointestinal: Positive for abdominal distention and diarrhea.   All other systems reviewed and are negative.      Physical Exam   BP: 140/80  Pulse: 134  Temp: 98.3  F (36.8  C)  Resp: 20  Height: 177.8 cm (5' 10\")  Weight: 71.7 kg (158 lb)  SpO2: 100 %      Physical Exam  BP 94/54  Pulse 134  Temp 98.3  F (36.8  C) (Oral)  Resp 20  Ht 1.778 m (5' 10\")  Wt 71.7 kg (158 lb)  SpO2 94%  BMI 22.67 kg/m2  General: alert, interactive, in moderate respiratory distress.  Notably distended abdomen  Head: atraumatic  Nose: no rhinorrhea or epistaxis  Ears: no external auditory canal discharge or bleeding.    Eyes: Sclera nonicteric. Conjunctiva noninjected.    Mouth: no tonsillar erythema, edema, or exudate  Neck: supple, no palp LAD  Lungs: Clear, however patient with increased work of breathing  CV: RRR, S1/S2; peripheral pulses palpable and symmetric  Abdomen: distended with fluid wave present.  " "  Extremities: no cyanosis or edema  Skin: no rash or diaphoresis  Neuro:  strength 5/5 in UE and LEs bilaterally, sensation intact to light touch in UE and LEs bilaterally;       ED Course     ED Course     Paracentesis  Date/Time: 10/30/2017 3:11 AM  Performed by: SHAHID MILLER  Authorized by: SHAHID MILLER   Consent: Verbal consent obtained. Written consent obtained.  Risks and benefits: risks, benefits and alternatives were discussed  Consent given by: patient  Patient understanding: patient states understanding of the procedure being performed  Patient consent: the patient's understanding of the procedure matches consent given  Patient identity confirmed: verbally with patient  Time out: Immediately prior to procedure a \"time out\" was called to verify the correct patient, procedure, equipment, support staff and site/side marked as required.  Initial or subsequent exam: initial  Procedure purpose: therapeutic  Indications: abdominal discomfort secondary to ascites and respiratory distress secondary to ascites  Anesthesia: local infiltration    Anesthesia:  Local Anesthetic: lidocaine 1% without epinephrine  Anesthetic total: 3 mL    Sedation:  Patient sedated: no  Preparation: Patient was prepped and draped in the usual sterile fashion.  Needle gauge: 18  Ultrasound guidance: yes  Puncture site: left lower quadrant  Fluid removed: 8600(ml)  Fluid appearance: serous  Dressing: 4x4 sterile gauze  Patient tolerance: Patient tolerated the procedure well with no immediate complications          Results for orders placed during the hospital encounter of 10/30/17   POC US ABDOMEN LIMITED    [facility] Procedure Note      limited abdome    PROCEDURE: PERFORMED BY: Dr. Shahid Miller  INDICATIONS/SYMPTOM:  Abdominal Pain  PROBE: Low frequency convex probe  BODY LOCATION: The ultrasound was performed in the abdominal areas.  FINDINGS: Free fluid in the abdomen with known history of " ascities      INTERPRETATION: FAST exam revealed Free fluid in abdomen consistent with known ascites.  IMAGE DOCUMENTATION: Images were archived to PACs system.               Critical Care time:  none               Labs Ordered and Resulted from Time of ED Arrival Up to the Time of Departure from the ED   CBC WITH PLATELETS DIFFERENTIAL - Abnormal; Notable for the following:        Result Value    WBC 11.3 (*)     RBC Count 3.28 (*)     Hemoglobin 8.8 (*)     Hematocrit 27.2 (*)     RDW 17.2 (*)     Absolute Neutrophil 8.6 (*)     All other components within normal limits   COMPREHENSIVE METABOLIC PANEL - Abnormal; Notable for the following:     Carbon Dioxide 19 (*)     Glucose 113 (*)     Calcium 8.0 (*)     Bilirubin Total 2.7 (*)     Albumin 2.3 (*)     Protein Total 6.3 (*)     Alkaline Phosphatase 221 (*)     All other components within normal limits   INR - Abnormal; Notable for the following:     INR 1.83 (*)     All other components within normal limits     Results for orders placed or performed during the hospital encounter of 10/30/17 (from the past 24 hour(s))   CBC with platelets differential   Result Value Ref Range    WBC 11.3 (H) 4.0 - 11.0 10e9/L    RBC Count 3.28 (L) 4.4 - 5.9 10e12/L    Hemoglobin 8.8 (L) 13.3 - 17.7 g/dL    Hematocrit 27.2 (L) 40.0 - 53.0 %    MCV 83 78 - 100 fl    MCH 26.8 26.5 - 33.0 pg    MCHC 32.4 31.5 - 36.5 g/dL    RDW 17.2 (H) 10.0 - 15.0 %    Platelet Count 159 150 - 450 10e9/L    Diff Method Automated Method     % Neutrophils 76.4 %    % Lymphocytes 12.0 %    % Monocytes 11.1 %    % Eosinophils 0.2 %    % Basophils 0.1 %    % Immature Granulocytes 0.2 %    Absolute Neutrophil 8.6 (H) 1.6 - 8.3 10e9/L    Absolute Lymphocytes 1.4 0.8 - 5.3 10e9/L    Absolute Monocytes 1.3 0.0 - 1.3 10e9/L    Absolute Eosinophils 0.0 0.0 - 0.7 10e9/L    Absolute Basophils 0.0 0.0 - 0.2 10e9/L    Abs Immature Granulocytes 0.0 0 - 0.4 10e9/L   Comprehensive metabolic panel   Result Value Ref  Range    Sodium 134 133 - 144 mmol/L    Potassium 4.6 3.4 - 5.3 mmol/L    Chloride 105 94 - 109 mmol/L    Carbon Dioxide 19 (L) 20 - 32 mmol/L    Anion Gap 10 3 - 14 mmol/L    Glucose 113 (H) 70 - 99 mg/dL    Urea Nitrogen 23 7 - 30 mg/dL    Creatinine 1.24 0.66 - 1.25 mg/dL    GFR Estimate 61 >60 mL/min/1.7m2    GFR Estimate If Black 73 >60 mL/min/1.7m2    Calcium 8.0 (L) 8.5 - 10.1 mg/dL    Bilirubin Total 2.7 (H) 0.2 - 1.3 mg/dL    Albumin 2.3 (L) 3.4 - 5.0 g/dL    Protein Total 6.3 (L) 6.8 - 8.8 g/dL    Alkaline Phosphatase 221 (H) 40 - 150 U/L    ALT 19 0 - 70 U/L    AST 38 0 - 45 U/L   INR   Result Value Ref Range    INR 1.83 (H) 0.86 - 1.14   POC US ABDOMEN LIMITED    Impression    [facility] Procedure Note      limited abdome    PROCEDURE: PERFORMED BY: Dr. Shahid Miller  INDICATIONS/SYMPTOM:  Abdominal Pain  PROBE: Low frequency convex probe  BODY LOCATION: The ultrasound was performed in the abdominal areas.  FINDINGS: Free fluid in the abdomen with known history of ascities      INTERPRETATION: FAST exam revealed Free fluid in abdomen consistent with known ascites.  IMAGE DOCUMENTATION: Images were archived to PACs system.            Assessments & Plan (with Medical Decision Making)  52 year old male , with known history of alcoholic cirrhosis who has not had any alcohol since February, 2016, and presents to the emergency department with increasing amounts of abdominal distention, in addition to shortness of breath.  Patient had drainage of approximately 9.8 L of fluid on October 11.  He has since reaccumulated fluid in his abdomen, causing shortness of breath.  Patient arrives with significant amounts of shortness of breath secondary to the abdominal distention.  He recently started lactulose on Friday, 2 days ago.  Has had frequent stooling secondary to the lactulose.  No other changes in medications.  He is unable to take furosemide secondary to prior hypokalemia with that medication.   Patient has GI follow-up in approximately 8-9 days.    I had discussion with the patient, and given his shortness of breath with abdominal distention, and free fluid consistent with ascites, paracentesis which was originally planned for later this afternoon will be performed now in the emergency department.  Verbal, in addition a written consent is performed.  Patient given IV Dilaudid as well.  8.6 L of ascitic fluid was removed from the abdomen.  He was given albumin before and during the procedure.  Blood pressures remained stable throughout his ED course.  He felt much improved, with respiratory status that it also improved prior to discharge.  Patient has GI follow-up.  No immediate complications secondary to the procedure.  Recommended continued follow-up with primary care provider, in addition to GI.       I have reviewed the nursing notes.    I have reviewed the findings, diagnosis, plan and need for follow up with the patient.       Discharge Medication List as of 10/30/2017  2:52 AM          Final diagnoses:   Alcoholic cirrhosis of liver with ascites (H)   SOB (shortness of breath)   Abdominal pain, generalized       10/30/2017   [facility] EMERGENCY DEPARTMENT     Shahid Miller MD  10/30/17 0315

## 2017-10-30 NOTE — ED AVS SNAPSHOT
[facility] Emergency Department    [address] [address]    Phone:  [phone]    Fax:  [phone]                                       Ivan Bell   MRN: [id number]    Department:  [facility] Emergency Department   Date of Visit:  10/30/2017           After Visit Summary Signature Page     I have received my discharge instructions, and my questions have been answered. I have discussed any challenges I see with this plan with the nurse or doctor.    ..........................................................................................................................................  Patient/Patient Representative Signature      ..........................................................................................................................................  Patient Representative Print Name and Relationship to Patient    ..................................................               ................................................  Date                                            Time    ..........................................................................................................................................  Reviewed by Signature/Title    ...................................................              ..............................................  Date                                                            Time

## 2017-10-30 NOTE — ED NOTES
"Pt reports increased abdominal pain since 1200 10/29. Pt describes \"gas\" \"constant\" pain in abdomen. Pt states pain and distention in abdomen are causing shortness of breath. Pt states he does have cirrhosis and is due to have abdominal paracentesis at 3pm today. Pt states he started taking lactulose Friday night and has since started abdominal discomfort. Pt reports about 12 stools today.   "

## 2017-10-30 NOTE — ED AVS SNAPSHOT
[facility] Emergency Department    [address] [address]    Phone:  [phone]    Fax:  [phone]                                       Ivan Bell   MRN: [id number]    Department:  [facility] Emergency Department   Date of Visit:  10/30/2017           Patient Information     Date Of Birth          1965        Your diagnoses for this visit were:     Alcoholic cirrhosis of liver with ascites (H)     SOB (shortness of breath)     Abdominal pain, generalized        You were seen by Shahid Miller MD.        Discharge Instructions       Follow up with primary doctors as planned.        Paracentesis  Your healthcare provider recommends that you have paracentesis. This is a procedure to remove extra fluid from your belly (abdomen). A needle is used to drain the fluid. A small sample of fluid may be taken and tested for problems. If the fluid buildup is causing discomfort or pain, all of the fluid may be drained. To do this, a tube is attached to the needle. The fluid is drained into a container that sits outside of the body. If symptoms are severe, paracentesis may be done as an emergency procedure. Otherwise, it will be scheduled ahead of time. Read on to learn more about paracentesis and how it works.     Understanding ascites  Many of the body s organs, including the liver and intestines, are inside the belly (abdomen). The organs are covered in a thin membrane called the peritoneum. The peritoneum has 2 layers. It makes a fluid that allows the layers to glide smoothly past each other. If this fluid builds up in the belly, the condition is called ascites. Ascites causes pain and discomfort. It can also make it hard to breathe. Fluid can build up for a number of reasons. These include chronic liver disease (cirrhosis), heart or kidney failure, and cancer. Your provider can tell you more about the cause of your ascites.  How paracentesis works  The goal of paracentesis may  be to help diagnose the cause of the excess fluid. Or, the goal may be to drain excess fluid from the abdomen. In some cases, fluid returns and the procedure needs to be repeated.  Before the procedure    Tell your provider about any medicines you are taking. This includes all prescription medicines, over-the-counter medicines, street drugs, herbs, vitamins, and other supplements.    Tell your provider about any allergies you have.    Before the procedure begins, you ll be asked to empty your bladder. This helps prevent injury to the bladder during the procedure. If needed, a thin tube (Blue catheter) may be placed into your bladder to drain urine during the procedure. This tube is removed after the procedure.    An IV (intravenous) line may be put into a vein in your arm or hand. This line supplies fluids and medicines.  During the procedure    You are awake during the procedure.    An imaging method called ultrasound may be used to guide the procedure. It shows live images of the inside of your belly on a video screen. This helps the provider find the site of the excess fluid inside your belly and decide where to insert the needle.    A numbing medicine (local anesthesia) is injected into your belly where the needle will be inserted.    Once the skin is numb, the provider carefully inserts the needle into the belly. This causes the needle to fill with fluid.    The needle may be removed with only a small sample of fluid. This sample is sent to a lab for testing. Getting a sample takes about 10 to 15 minutes.    Or, a tube may be attached to the needle so that more of the excess fluid can be drained. The tube may be taped or stitched into place. This keeps it from pulling the needle out of your belly.    How long it takes to drain all of the fluid varies for each person. In most cases, it takes about 30 minutes. Your provider will let you know if the procedure is expected to take longer than usual.    Once all of  the fluid is drained, the needle and tube are removed.    Pressure is put on the puncture site to stop any fluid leakage or bleeding.    A small bandage is placed over the puncture site. Albumin may be given during or after the procedure to prevent low blood pressure or kidney problems.  After the procedure  You may be taken to a recovery room to rest after the procedure. If you are in pain, you will be given medicine as needed. You will likely be sent home 1 to 2 hours after the procedure is done. When you leave the hospital, have an adult family member or friend drive you home. If you are staying in the hospital, you will return to your hospital room.  Risks and possible complications of paracentesis  This procedure is considered safe. But like all procedures, it carries some risks. These include the following:    Bleeding    Infection    Injury to structures in the belly    Fast drop in blood pressure   Recovering at home    If needed, your provider can prescribe or recommend pain medicines for you to take at home. Take these exactly as directed. If you stopped taking other medicines before the procedure, ask your provider when you can start them again.    You may remove the bandage 24 hours after the procedure.    Take it easy for 24 hours after the procedure. Avoid physical activity until your provider says it s OK.  Follow-up care  Make a follow-up appointment with your provider as directed. During your follow-up visit, your provider will check your healing. Let your provider know how you are feeling. You can also discuss the cause of your ascites and if any more treatment is needed.   When to seek medical care  Call your healthcare provider if you notice any of the following after the procedure:    A fever of 100.4 F (38.0 C) or higher    Trouble breathing    Pain that does not go away even after taking pain medicine    Belly pain not caused by having the skin punctured    Bleeding from the puncture  site    More than a small amount of fluid leakage from the puncture site    Swollen belly    Signs of infection at the puncture site. These include increased pain, redness, or swelling, as well as warmth or bad-smelling drainage.    Blood in your urine    Dizziness, lightheadedness, or fainting   Date Last Reviewed: 7/1/2016 2000-2017 [employer]. [address] [place] [address]. All rights reserved. This information is not intended as a substitute for professional medical care. Always follow your healthcare professional's instructions.          Future Appointments        Provider Department Dept Phone Center    10/30/2017 8:45 AM [place] Radiologist; [place] [facility] Ultrasound [phone] [facility]    10/30/2017 3:30 PM [facility] Ultrasound [phone] [facility]    11/8/2017 9:15 AM [facility] [phone] [facility]    11/8/2017 10:00 AM Gonzalo Bales MD [facility] Hepatology [phone] [facility]    11/28/2017 7:45 AM [facility] [phone] [facility]    11/28/2017 8:40 AM Gonzalo Bales MD [facility] Hepatology [phone] [facility]    11/28/2017 9:00 AM Diana Heaton RD [facility] Solid Organ Transplant [phone] [facility]      24 Hour Appointment Hotline       To make an appointment at any [facility], call [phone] ([phone]). If you don't have a family doctor or clinic, we will help you find one. [facility] are conveniently located to serve the needs of you and your family.             Review of your medicines      Our records show that you are taking the medicines listed below. If these are incorrect, please call your family doctor or clinic.        Dose / Directions Last dose taken    lactulose 10 GM/15ML solution   Commonly known as:  CHRONULAC   Dose:  30 g        Take 30 g by mouth 2 times daily   Refills:  0        MULTIPLE VITAMINS PO   Dose:  1 tablet         Take 1 tablet by mouth daily   Refills:  0        sildenafil 20 MG tablet   Commonly known as:  REVATIO   Dose:  20 mg   Quantity:  90 tablet        Take 1 tablet (20 mg) by mouth 3 times daily Take 1-3 po daily prn   Refills:  3        spironolactone 100 MG tablet   Commonly known as:  ALDACTONE   Dose:  100 mg   Quantity:  30 tablet        Take 1 tablet (100 mg) by mouth daily   Refills:  3        triamcinolone 0.1 % cream   Commonly known as:  KENALOG   Quantity:  80 g        Apply sparingly to affected area three times daily as needed for itching.   Refills:  3        TYLENOL PO        Take by mouth every 4 hours as needed for mild pain or fever   Refills:  0        UNABLE TO FIND        MEDICATION NAME: P6   Refills:  0                Procedures and tests performed during your visit     CBC with platelets differential    Comprehensive metabolic panel    INR    POC US ABDOMEN LIMITED      Orders Needing Specimen Collection     None      Pending Results     Date and Time Order Name Status Description    10/30/2017 0045 POC US ABDOMEN LIMITED In process             Pending Culture Results     No orders found from 10/28/2017 to 10/31/2017.            Pending Results Instructions     If you had any lab results that were not finalized at the time of your Discharge, you can call the ED Lab Result RN at [phone]. You will be contacted by this team for any positive Lab results or changes in treatment. The nurses are available 7 days a week from 10A to 6:30P.  You can leave a message 24 hours per day and they will return your call.        Test Results From Your Hospital Stay        10/30/2017 12:42 AM      Component Results     Component Value Ref Range & Units Status    WBC 11.3 (H) 4.0 - 11.0 10e9/L Final    RBC Count 3.28 (L) 4.4 - 5.9 10e12/L Final    Hemoglobin 8.8 (L) 13.3 - 17.7 g/dL Final    Hematocrit 27.2 (L) 40.0 - 53.0 % Final    MCV 83 78 - 100 fl Final    MCH 26.8 26.5 - 33.0 pg Final    MCHC 32.4  31.5 - 36.5 g/dL Final    RDW 17.2 (H) 10.0 - 15.0 % Final    Platelet Count 159 150 - 450 10e9/L Final    Diff Method Automated Method  Final    % Neutrophils 76.4 % Final    % Lymphocytes 12.0 % Final    % Monocytes 11.1 % Final    % Eosinophils 0.2 % Final    % Basophils 0.1 % Final    % Immature Granulocytes 0.2 % Final    Absolute Neutrophil 8.6 (H) 1.6 - 8.3 10e9/L Final    Absolute Lymphocytes 1.4 0.8 - 5.3 10e9/L Final    Absolute Monocytes 1.3 0.0 - 1.3 10e9/L Final    Absolute Eosinophils 0.0 0.0 - 0.7 10e9/L Final    Absolute Basophils 0.0 0.0 - 0.2 10e9/L Final    Abs Immature Granulocytes 0.0 0 - 0.4 10e9/L Final         10/30/2017 12:59 AM      Component Results     Component Value Ref Range & Units Status    Sodium 134 133 - 144 mmol/L Final    Potassium 4.6 3.4 - 5.3 mmol/L Final    Chloride 105 94 - 109 mmol/L Final    Carbon Dioxide 19 (L) 20 - 32 mmol/L Final    Anion Gap 10 3 - 14 mmol/L Final    Glucose 113 (H) 70 - 99 mg/dL Final    Urea Nitrogen 23 7 - 30 mg/dL Final    Creatinine 1.24 0.66 - 1.25 mg/dL Final    GFR Estimate 61 >60 mL/min/1.7m2 Final    Non  GFR Calc    GFR Estimate If Black 73 >60 mL/min/1.7m2 Final    African American GFR Calc    Calcium 8.0 (L) 8.5 - 10.1 mg/dL Final    Bilirubin Total 2.7 (H) 0.2 - 1.3 mg/dL Final    Albumin 2.3 (L) 3.4 - 5.0 g/dL Final    Protein Total 6.3 (L) 6.8 - 8.8 g/dL Final    Alkaline Phosphatase 221 (H) 40 - 150 U/L Final    ALT 19 0 - 70 U/L Final    AST 38 0 - 45 U/L Final         10/30/2017  1:18 AM      Component Results     Component Value Ref Range & Units Status    INR 1.83 (H) 0.86 - 1.14 Final         10/30/2017 12:45 AM      Result not yet available     Exam Begun                Thank you for choosing [facility]       Thank you for choosing [facility] for your care. Our goal is always to provide you with excellent care. Hearing back from our patients is one way we can continue to improve our services. Please take a few  minutes to complete the written survey that you may receive in the mail after you visit with us. Thank you!        [employer] Information     [employer] gives you secure access to your electronic health record. If you see a primary care provider, you can also send messages to your care team and make appointments. If you have questions, please call your primary care clinic.  If you do not have a primary care provider, please call [phone] and they will assist you.        Care EveryWhere ID     This is your Care EveryWhere ID. This could be used by other organizations to access your [place] medical records  [id number]        Equal Access to Services     [facility] Center[facility]KH[facility] : Elie Myles, sarah guevara, jeet cabral, jenny molina. So [facility] [phone].    ATENCIÓN: Si habla español, tiene a lee disposición servicios gratuitos de asistencia lingüística. Llame al [phone].    We comply with applicable federal civil rights laws and [place] laws. We do not discriminate on the basis of race, color, national origin, age, disability, sex, sexual orientation, or gender identity.            After Visit Summary       This is your record. Keep this with you and show to your community pharmacist(s) and doctor(s) at your next visit.

## 2017-10-31 ENCOUNTER — APPOINTMENT (OUTPATIENT)
Dept: ULTRASOUND IMAGING | Facility: CLINIC | Age: 52
End: 2017-10-31
Attending: INTERNAL MEDICINE
Payer: COMMERCIAL

## 2017-10-31 ENCOUNTER — APPOINTMENT (OUTPATIENT)
Dept: CT IMAGING | Facility: CLINIC | Age: 52
End: 2017-10-31
Attending: INTERNAL MEDICINE
Payer: COMMERCIAL

## 2017-10-31 LAB
ABO + RH BLD: NORMAL
ABO + RH BLD: NORMAL
ALBUMIN SERPL-MCNC: 3.4 G/DL (ref 3.4–5)
ALP SERPL-CCNC: 106 U/L (ref 40–150)
ALT SERPL W P-5'-P-CCNC: 14 U/L (ref 0–70)
ANION GAP SERPL CALCULATED.3IONS-SCNC: 6 MMOL/L (ref 3–14)
AST SERPL W P-5'-P-CCNC: 24 U/L (ref 0–45)
BILIRUB SERPL-MCNC: 3.5 MG/DL (ref 0.2–1.3)
BLD GP AB SCN SERPL QL: NORMAL
BLD PROD TYP BPU: NORMAL
BLD PROD TYP BPU: NORMAL
BLD UNIT ID BPU: 0
BLOOD BANK CMNT PATIENT-IMP: NORMAL
BLOOD PRODUCT CODE: NORMAL
BPU ID: NORMAL
BUN SERPL-MCNC: 29 MG/DL (ref 7–30)
CALCIUM SERPL-MCNC: 7.7 MG/DL (ref 8.5–10.1)
CHLORIDE SERPL-SCNC: 106 MMOL/L (ref 94–109)
CK SERPL-CCNC: 53 U/L (ref 30–300)
CO2 SERPL-SCNC: 22 MMOL/L (ref 20–32)
CREAT SERPL-MCNC: 1.18 MG/DL (ref 0.66–1.25)
ERYTHROCYTE [DISTWIDTH] IN BLOOD BY AUTOMATED COUNT: 16.8 % (ref 10–15)
FLEXIBLE SIGMOIDOSCOPY: NORMAL
GFR SERPL CREATININE-BSD FRML MDRD: 64 ML/MIN/1.7M2
GLUCOSE SERPL-MCNC: 102 MG/DL (ref 70–99)
HCT VFR BLD AUTO: 25 % (ref 40–53)
HGB BLD-MCNC: 6 G/DL (ref 13.3–17.7)
HGB BLD-MCNC: 8 G/DL (ref 13.3–17.7)
HGB BLD-MCNC: 8.1 G/DL (ref 13.3–17.7)
HGB BLD-MCNC: 8.9 G/DL (ref 13.3–17.7)
INR PPP: 2.55 (ref 0.86–1.14)
LACTATE BLD-SCNC: 1.4 MMOL/L (ref 0.7–2)
MCH RBC QN AUTO: 27.5 PG (ref 26.5–33)
MCHC RBC AUTO-ENTMCNC: 32 G/DL (ref 31.5–36.5)
MCV RBC AUTO: 86 FL (ref 78–100)
NUM BPU REQUESTED: 2
PLATELET # BLD AUTO: 70 10E9/L (ref 150–450)
POTASSIUM SERPL-SCNC: 4.3 MMOL/L (ref 3.4–5.3)
PROT SERPL-MCNC: 5.9 G/DL (ref 6.8–8.8)
RBC # BLD AUTO: 2.91 10E12/L (ref 4.4–5.9)
SODIUM SERPL-SCNC: 134 MMOL/L (ref 133–144)
SPECIMEN EXP DATE BLD: NORMAL
TRANSFUSION STATUS PATIENT QL: NORMAL
TRANSFUSION STATUS PATIENT QL: NORMAL
UPPER GI ENDOSCOPY: NORMAL
WBC # BLD AUTO: 6 10E9/L (ref 4–11)

## 2017-10-31 PROCEDURE — 85018 HEMOGLOBIN: CPT | Performed by: INTERNAL MEDICINE

## 2017-10-31 PROCEDURE — 06L38CZ OCCLUSION OF ESOPHAGEAL VEIN WITH EXTRALUMINAL DEVICE, VIA NATURAL OR ARTIFICIAL OPENING ENDOSCOPIC: ICD-10-PCS | Performed by: INTERNAL MEDICINE

## 2017-10-31 PROCEDURE — 99152 MOD SED SAME PHYS/QHP 5/>YRS: CPT | Performed by: INTERNAL MEDICINE

## 2017-10-31 PROCEDURE — 85018 HEMOGLOBIN: CPT | Performed by: HOSPITALIST

## 2017-10-31 PROCEDURE — 73700 CT LOWER EXTREMITY W/O DYE: CPT | Mod: LT

## 2017-10-31 PROCEDURE — 25000125 ZZHC RX 250: Performed by: INTERNAL MEDICINE

## 2017-10-31 PROCEDURE — 82550 ASSAY OF CK (CPK): CPT | Performed by: STUDENT IN AN ORGANIZED HEALTH CARE EDUCATION/TRAINING PROGRAM

## 2017-10-31 PROCEDURE — 87040 BLOOD CULTURE FOR BACTERIA: CPT | Performed by: INTERNAL MEDICINE

## 2017-10-31 PROCEDURE — 36415 COLL VENOUS BLD VENIPUNCTURE: CPT | Performed by: INTERNAL MEDICINE

## 2017-10-31 PROCEDURE — 25000128 H RX IP 250 OP 636: Performed by: INTERNAL MEDICINE

## 2017-10-31 PROCEDURE — 12000006 ZZH R&B IMCU INTERMEDIATE UMMC

## 2017-10-31 PROCEDURE — P9016 RBC LEUKOCYTES REDUCED: HCPCS | Performed by: INTERNAL MEDICINE

## 2017-10-31 PROCEDURE — 85027 COMPLETE CBC AUTOMATED: CPT | Performed by: PHYSICIAN ASSISTANT

## 2017-10-31 PROCEDURE — 45330 DIAGNOSTIC SIGMOIDOSCOPY: CPT | Performed by: INTERNAL MEDICINE

## 2017-10-31 PROCEDURE — 25000128 H RX IP 250 OP 636: Performed by: STUDENT IN AN ORGANIZED HEALTH CARE EDUCATION/TRAINING PROGRAM

## 2017-10-31 PROCEDURE — 43244 EGD VARICES LIGATION: CPT | Performed by: INTERNAL MEDICINE

## 2017-10-31 PROCEDURE — 76705 ECHO EXAM OF ABDOMEN: CPT | Mod: XS

## 2017-10-31 PROCEDURE — 83605 ASSAY OF LACTIC ACID: CPT | Performed by: INTERNAL MEDICINE

## 2017-10-31 PROCEDURE — 0DJD8ZZ INSPECTION OF LOWER INTESTINAL TRACT, VIA NATURAL OR ARTIFICIAL OPENING ENDOSCOPIC: ICD-10-PCS | Performed by: INTERNAL MEDICINE

## 2017-10-31 PROCEDURE — 80053 COMPREHEN METABOLIC PANEL: CPT | Performed by: PHYSICIAN ASSISTANT

## 2017-10-31 PROCEDURE — 36415 COLL VENOUS BLD VENIPUNCTURE: CPT | Performed by: PHYSICIAN ASSISTANT

## 2017-10-31 PROCEDURE — 99233 SBSQ HOSP IP/OBS HIGH 50: CPT | Mod: GC | Performed by: INTERNAL MEDICINE

## 2017-10-31 PROCEDURE — S0164 INJECTION PANTROPRAZOLE: HCPCS | Performed by: INTERNAL MEDICINE

## 2017-10-31 PROCEDURE — 40000141 ZZH STATISTIC PERIPHERAL IV START W/O US GUIDANCE

## 2017-10-31 PROCEDURE — 36415 COLL VENOUS BLD VENIPUNCTURE: CPT | Performed by: HOSPITALIST

## 2017-10-31 PROCEDURE — 85610 PROTHROMBIN TIME: CPT | Performed by: PHYSICIAN ASSISTANT

## 2017-10-31 RX ORDER — FENTANYL CITRATE 50 UG/ML
INJECTION, SOLUTION INTRAMUSCULAR; INTRAVENOUS PRN
Status: DISCONTINUED | OUTPATIENT
Start: 2017-10-31 | End: 2017-10-31 | Stop reason: HOSPADM

## 2017-10-31 RX ORDER — HYDROMORPHONE HYDROCHLORIDE 1 MG/ML
.3-.5 INJECTION, SOLUTION INTRAMUSCULAR; INTRAVENOUS; SUBCUTANEOUS EVERY 6 HOURS PRN
Status: DISCONTINUED | OUTPATIENT
Start: 2017-10-31 | End: 2017-11-02

## 2017-10-31 RX ADMIN — HYDROMORPHONE HYDROCHLORIDE 0.5 MG: 1 INJECTION, SOLUTION INTRAMUSCULAR; INTRAVENOUS; SUBCUTANEOUS at 00:09

## 2017-10-31 RX ADMIN — VANCOMYCIN HYDROCHLORIDE 1500 MG: 10 INJECTION, POWDER, LYOPHILIZED, FOR SOLUTION INTRAVENOUS at 20:58

## 2017-10-31 RX ADMIN — PANTOPRAZOLE SODIUM 8 MG/HR: 40 INJECTION, POWDER, FOR SOLUTION INTRAVENOUS at 16:53

## 2017-10-31 RX ADMIN — PHYTONADIONE 10 MG: 10 INJECTION, EMULSION INTRAMUSCULAR; INTRAVENOUS; SUBCUTANEOUS at 00:25

## 2017-10-31 RX ADMIN — VANCOMYCIN HYDROCHLORIDE 1500 MG: 10 INJECTION, POWDER, LYOPHILIZED, FOR SOLUTION INTRAVENOUS at 06:41

## 2017-10-31 RX ADMIN — CEFTRIAXONE SODIUM 2 G: 2 INJECTION, POWDER, FOR SOLUTION INTRAMUSCULAR; INTRAVENOUS at 17:38

## 2017-10-31 ASSESSMENT — ACTIVITIES OF DAILY LIVING (ADL)
ADLS_ACUITY_SCORE: 11
ADLS_ACUITY_SCORE: 12

## 2017-10-31 ASSESSMENT — PAIN DESCRIPTION - DESCRIPTORS
DESCRIPTORS: SHARP
DESCRIPTORS: SHARP

## 2017-10-31 NOTE — PHARMACY-VANCOMYCIN DOSING SERVICE
Pharmacy Vancomycin Initial Note  Date of Service 2017  Patient's  1965  52 year old, male    Indication: Bacteremia    Current estimated CrCl = Estimated Creatinine Clearance: 72.6 mL/min (based on Cr of 1.15).    Creatinine for last 3 days  10/30/2017: 12:35 AM Creatinine 1.24 mg/dL;  1:45 PM Creatinine 1.11 mg/dL;  8:06 PM Creatinine 1.15 mg/dL    Recent Vancomycin Level(s) for last 3 days  No results found for requested labs within last 72 hours.      Vancomycin IV Administrations (past 72 hours)      No vancomycin orders with administrations in past 72 hours.                Nephrotoxins and other renal medications (Future)    Start     Dose/Rate Route Frequency Ordered Stop    10/31/17 0515  vancomycin (VANCOCIN) 1,500 mg in NaCl 0.9 % 250 mL intermittent infusion      1,500 mg  over 90 Minutes Intravenous EVERY 12 HOURS 10/31/17 0503            Contrast Orders - past 72 hours     None           Plan:  1.  Start vancomycin 1500 mg IV q12h.   2.  Goal Trough Level: 15-20 mg/L   3.  Pharmacy will check trough levels as appropriate in 1-3 Days.    4. Serum creatinine levels will be ordered daily for the first week of therapy and at least twice weekly for subsequent weeks.    5. [place] method utilized to dose vancomycin therapy: Method 2    Jolanta Almanza, PharmD, BCPS

## 2017-10-31 NOTE — PLAN OF CARE
Problem: Patient Care Overview  Goal: Plan of Care/Patient Progress Review     Pt admitted from OSH ED with GI bleed and left upper leg pain, tender to touch. U/S of leg negative. 1 unit PRBCs completed after arrival to . Recheck Hgb 6.5, another unit ordered. Blood transfusion consent obtained. Protonix and Octreotide boluses and gtts given/started. Upon arrival, diagnostic paracentesis performed, results positive for SBP. Lactate 3.3 to most recent 2.6. BPs 100s/60s on admission then decreasing to 70s-80s/40s, MD aware. 100g albumin given. No further BMs since arrival. Pt also has not voided yet. Pt alert and oriented x4. Pt's family at bedside upon admission and updated on plan of care.   Plan for endoscopy procedure tomorrow (10/31).  Monitor hemodynamics closely.

## 2017-10-31 NOTE — PROGRESS NOTES
Care Coordinator Progress Note     Admission Date/Time:  10/30/2017  Attending MD:  Eric Mya MD     Data  Chart reviewed, discussed with interdisciplinary team.   Patient was admitted for: Gastrointestinal hemorrhage, unspecified gastrointestinal hemorrhage type.    Concerns with insurance coverage for discharge needs: None.  Current Living Situation: Patient lives with spouse.  Support System: Supportive and Involved  Services Involved: none prior to admission  Transportation: Family or Friend will provide  Barriers to Discharge: medical course     Assessment  Patient admitted with BRBPR and fever. History of alcoholic cirrhosis. Met with patients wife and daughter, introduced self and RNCC role. Patient lives independently at home. Denies need for any services. Family will transport home at time of discharge.      Plan  Anticipated Discharge Date:  TBD  Anticipated Discharge Plan:  Home    Jennifer Rowland RN

## 2017-10-31 NOTE — PROCEDURES
Paracentesis Procedure Note  Date of Service: 10/30/2017    Patient: Ivan Bell  MRN: [id number]  Admission Date: 10/30/2017  Hospital Day # 0    Attending: Dr. Vincent Rick  Resident: Dr. Basim Jimenez  Procedure: Diagnostic paracentesis  Indication: SBP  Pre-procedure diagnosis: Possible SBP  Post-procedure diagnosis: pending    The risks and benefits of the procedure were explained to Mr. Bell who expressed understanding and opted to proceed.  Consent was obtained and placed in the chart.  A time out was performed.  An area of ascites was located and marked in the right lower quadrant; the area was prepped and draped in the usual sterile fashion.  6 ml of 1% lidocaine was instilled and ascites located. Approximately 10 ml of ascites was removed and was sent for analysis.   Patient tolerated the procedure well with no immediate complications.      Dr. Rick was present for the entirety of the procedure.     Basim Jimenez MD  Internal Medicine, PGY1  Pager: [phone]      Attending Attestation   I was present for the entirety of the procedure.     Vincent Rick MD  Med-Peds Hospitalist  Pager [phone]

## 2017-10-31 NOTE — CONSULTS
Hepatology Consultation    Ivan Bell   MRN# [id number]     Age: 52 year old YOB: 1965       Referring provider: Eric May  Attending Hepatologist: Dr. Uriostegui  Consult requested for: GI bleeding       Assessment and Recommendation:   Assessment:   Mr. Bell is a 52-year-old with a history of alcohol cirrhosis complicated by esophageal varices, HE, ascites and PVT. He was admitted with BRBPR and fevers after a paracentesis and transferred from OSH.       Recommendations:   1. BRBPR  2. History of small EV  - currently on octreotide and ppi  - did received x1 dose of vitamin K 10 IV, continue daily for total of 3 days.   - BP and hemoglobin improved with PRBC. Plan for EGD with +/- flex sig today.     3. SBP  4. Ascites  - on ceftriaxone and awaiting cultures  - will need to be on prophylaxis following completing treatment course  - Day 1 albumin bolus of 1.5 gm/kg, D3 albumin 1 gm/kg to protect kidney function.   - Will need to restart lasix and spironolactone later if labs stable.     5. Alcohol cirrhosis  6. Portal vein thrombosis  - MELD 26. Has not been evaluated for liver transplant in the past due to low MELD.   - Was advised as outpatient no alcohol use. Last had O'Douls approx 2 months ago.   - new PVT seen on imaging of CT and US. Would not start anticoagulation at this time. Will discuss with IR if any options.   - No evidence of HCC on imaging.     Plan of care discussed with Dr. Uriostegui    Thank you for the opportunity to be involved in Ivan Bell care. Please call with any questions or concerns.     Josseline Naylor, APRN, CNP  [phone]               History of Present Illness:   Ivan Bell is a 52 year old male with a history of alcohol cirrhosis, last O'Douls drink ~2 months ago. His liver disease has been complicated by ascites, HE, and EV. He started to have increasing abdominal distention requiring paracentesis of 9 liters on 10/11. This reoccurred quickly and  he went into the ER yesterday and had 8 L removed. He returned to the ER with BRBPR and his hemoglobin was 6.5 (dropped from 8.8 earlier in the day). He denies any fevers but was feeling chilled (no rigors). He did not have hematemesis, nausea or vomiting. His last EGD was 3/2016 with small varices seen. A CT did note a left portal vein thrombosis, possible splenic infarct, and nonocclusive thrombus in main PV.     Mr. Bell has not needed a paracentesis for some period of time and has not been on diuretics. He normally has ~2 BM's per day that are formed. 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 He continues to have appetite but doesn't eat very much due to activity with work. He has been trying to adhere to low sodium diet. He denies change in mentation. He stopped using chewing tobacco 1 week ago.              Past Medical History:     Past Medical History:   Diagnosis Date     Ascites      Cirrhosis (H)      Esophageal varices (H)      Hepatitis      Hypertension      Left calcaneus fracture 1/9/2006 January 16, 2006: Fell 10 feet from ladder onto left foot on frozen ground on 1/9/06 at home.  Immediate pain and unable to walk- seen at [place] and diagnosed with calcaneus fracture     Portal vein thrombosis     left occlusion, partial main              Past Surgical History:     Past Surgical History:   Procedure Laterality Date     ANKLE SURGERY Left      COLONOSCOPY N/A 3/31/2016    Procedure: COLONOSCOPY;  Surgeon: Rhys Uriostegui MD;  Location:  GI     ESOPHAGOSCOPY, GASTROSCOPY, DUODENOSCOPY (EGD), COMBINED N/A 3/31/2016    Procedure: COMBINED ESOPHAGOSCOPY, GASTROSCOPY, DUODENOSCOPY (EGD);  Surgeon: Rhys Uriostegui MD;  Location:  GI     KNEE SURGERY Left      KNEE SURGERY Right               Social History:     Social History   Substance Use Topics     Smoking status: Former Smoker     Types:  "Dip, chew, snus or snuff     Smokeless tobacco: Former User     Quit date: 10/24/2017      Comment: 1 tin per 10 days.     Alcohol use No      Comment: last etoh 2/14/16, did have Odouls ~8/2017             Family History:   The family history includes Breast Cancer in his maternal grandmother; CEREBROVASCULAR DISEASE (age of onset: 87) in his father; Depression in his daughter; Family History Negative in his father and mother; Hypertension in his father; Rheumatoid Arthritis in his daughter. There is no history of Cancer - colorectal, Prostate Cancer, or Liver Disease.             Immunizations:     Immunization History   Administered Date(s) Administered     Influenza Vaccine IM 3yrs+ 4 Valent IIV4 10/03/2016     Pneumococcal (PCV 13) 03/21/2016     TD (ADULT, 7+) 12/19/2005            Allergies:     Allergies   Allergen Reactions     Benadryl [Diphenhydramine] Other (See Comments)     Delirium (visual and auditory hallucinations)             Medications:   @  Prescriptions Prior to Admission   Medication Sig Dispense Refill Last Dose     SILDENAFIL CITRATE PO Take 10 mg by mouth daily   10/30/2017 at Unknown time     Pyridoxine HCl (VITAMIN B6 PO) Take by mouth daily        lactulose (CHRONULAC) 10 GM/15ML solution Take 15 g by mouth 2 times daily    10/30/2017 at Unknown time     Acetaminophen (TYLENOL PO) Take by mouth every 4 hours as needed for mild pain or fever   10/30/2017 at Unknown time     MULTIPLE VITAMINS PO Take 1 tablet by mouth daily   10/30/2017 at Unknown time   @          Review of Systems:    ROS: 10 point ROS neg other than the symptoms noted above in the HPI.          Physical Exam:   Blood pressure 102/66, pulse 82, temperature 98.7  F (37.1  C), temperature source Oral, resp. rate 14, height 1.778 m (5' 10\"), weight 69.9 kg (154 lb 3.2 oz), SpO2 98 %. Body mass index is 22.13 kg/(m^2).    Intake/Output Summary (Last 24 hours) at 10/31/17 1021  Last data filed at 10/31/17 0850   Gross per 24 " hour   Intake           893.17 ml   Output              500 ml   Net           393.17 ml     General: In no acute distress, mild facial muscle wasting  Neuro: AOx3, No asterixis  HEENT: PERRL trace scleral icterus, no overt oral lesions, tongue with demarcated area, no redness  Lymph:  No cervical lymphadenoapthy  CV: S1/P6yjqmsdu murmurs, Skin warm and dry  Lungs: clear to auscultation Respirations even and nonlabored on room air  Abd: Moderately distended  Extrem: Noperipehral edema  Skin: Nojaundice  Psych: pleasant         Data:     Lab Results   Component Value Date    WBC 6.0 10/31/2017     Lab Results   Component Value Date    RBC 2.91 10/31/2017     Lab Results   Component Value Date    HGB 8.0 10/31/2017     Lab Results   Component Value Date    HCT 25.0 10/31/2017     No components found for: MCT  Lab Results   Component Value Date    MCV 86 10/31/2017     Lab Results   Component Value Date    MCH 27.5 10/31/2017     Lab Results   Component Value Date    MCHC 32.0 10/31/2017     Lab Results   Component Value Date    RDW 16.8 10/31/2017     Lab Results   Component Value Date    PLT 70 10/31/2017       Last Basic Metabolic Panel:  Lab Results   Component Value Date     10/31/2017      Lab Results   Component Value Date    POTASSIUM 4.3 10/31/2017     Lab Results   Component Value Date    CHLORIDE 106 10/31/2017     Lab Results   Component Value Date    JULISSA 7.7 10/31/2017     Lab Results   Component Value Date    CO2 22 10/31/2017     Lab Results   Component Value Date    BUN 29 10/31/2017     Lab Results   Component Value Date    CR 1.18 10/31/2017     Lab Results   Component Value Date     10/31/2017       Liver Function Studies -   Recent Labs   Lab Test  10/31/17   0559   PROTTOTAL  5.9*   ALBUMIN  3.4   BILITOTAL  3.5*   ALKPHOS  106   AST  24   ALT  14       Lab Results   Component Value Date    INR 2.55 10/31/2017       MELD-Na score: 26 at 10/31/2017  5:59 AM  MELD score: 24 at 10/31/2017   5:59 AM  Calculated from:  Serum Creatinine: 1.18 mg/dL at 10/31/2017  5:59 AM  Serum Sodium: 134 mmol/L at 10/31/2017  5:59 AM  Total Bilirubin: 3.5 mg/dL at 10/31/2017  5:59 AM  INR(ratio): 2.55 at 10/31/2017  5:59 AM  Age: 52 years    IMAGING:  US abd 10/31/17  Impression:   1.  Cirrhotic configuration of the liver parenchyma. No focal liver  lesions.  2.  Bidirectional flow in the main portal vein at the kayla hepatis  with nonocclusive intraluminal thrombus.  3.  Occluded left portal vein, with no evidence of flow in the present  study.  4.  The right portal vein demonstrates retrograde flow. The anterior  branch of the right portal vein is occluded with no evidence of flow.  5.  Nonobstructive right renal stones.  6.  Moderate volume of ascites.    CT abd 10/30/17  IMPRESSION:  1. Cirrhosis of the liver. Thrombosis of the left portal vein and  anterior branch of the right portal vein. Nonocclusive thrombus is  noted in the main portal vein as above. Distal esophageal varices.  2. Splenomegaly with evidence of a new infarct involving the  anterosuperior spleen.  3. Moderate amount of ascites.  4. Nephrolithiasis bilaterally. There also appears to be a stone in  the mid distal left ureter. No significant left hydroureter or  hydronephrosis.  5. Gallstones.    EGD 3/31/16  Impression:          - Small (< 5 mm) esophageal varices.                        - Portal hypertensive gastropathy.                        - Normal duodenal bulb, first part of the duodenum and                        2nd part of the duodenum.

## 2017-10-31 NOTE — BRIEF OP NOTE
Upper GI Endoscopy 10/31/2017 11:50 AM [facility], [address] [place] [address] [phone]     Endoscopy Department   _______________________________________________________________________________   Patient Name: Ivan Bell           Procedure Date: 10/31/2017 11:50 AM   MRN: [id number]                       Account Number: [id number]   YOB: 1965              Admit Type: Inpatient   Age: 52                                Gender: Male   Note Status: Finalized                Attending MD: Armaan Adams MD   Pause for the Cause: time out performed Total Sedation Time:   _______________________________________________________________________________       Procedure:           Upper GI endoscopy   Indications:         Hematochezia   Providers:           Armaan Adams MD, Lida Sky RN   Referring MD:        Lm May MD   Medicines:           Midazolam 4 mg IV, Fentanyl 200 micrograms IV   Complications:       No immediate complications. Estimated blood loss:                        Minimal.   _______________________________________________________________________________   Procedure:           Pre-Anesthesia Assessment:                        - Prior to the procedure, a History and Physical was                        performed, and patient medications and allergies were                        reviewed. The patient is competent. The risks and                        benefits of the procedure and the sedation options and                        risks were discussed with the patient. All questions                        were answered and informed consent was obtained. Patient                        identification and proposed procedure were verified by                        the physician and the nurse in the procedure room.                        Mental Status Examination: alert and oriented. Airway                        Examination: normal oropharyngeal  airway and neck                        mobility. Respiratory Examination: clear to                        auscultation. CV Examination: normal. Prophylactic                        Antibiotics: The patient does not require prophylactic                        antibiotics. Prior Anticoagulants: The patient has taken                        no previous anticoagulant or antiplatelet agents. ASA                        Grade Assessment: III - A patient with severe systemic                        disease. After reviewing the risks and benefits, the                        patient was deemed in satisfactory condition to undergo                        the procedure. The anesthesia plan was to use moderate                        sedation / analgesia (conscious sedation). Immediately                        prior to administration of medications, the patient was                        re-assessed for adequacy to receive sedatives. The heart                        rate, respiratory rate, oxygen saturations, blood                        pressure, adequacy of pulmonary ventilation, and                        response to care were monitored throughout the                        procedure. The physical status of the patient was                        re-assessed after the procedure.                        After obtaining informed consent, the endoscope was                        passed under direct vision. Throughout the procedure,                        the patient's blood pressure, pulse, and oxygen                        saturations were monitored continuously. The Endoscope                        was introduced through the mouth, and advanced to the                        second part of duodenum. The upper GI endoscopy was                        accomplished without difficulty. The patient tolerated                        the procedure well.                                                                                      Findings:        The examined duodenum was normal.        Mild portal hypertensive gastropathy was found in the entire examined        stomach.        There is no endoscopic evidence of varices in the entire examined        stomach.        Two columns of non-bleeding grade III varices were found in the lower        third of the esophagus,. No stigmata of recent bleeding were evident but        red lucia signs were present. Five bands were successfully placed with        complete eradication, resulting in deflation of varices. There was no        bleeding at the end of the procedure.                                                                                     Moderate Sedation:        Moderate (conscious) sedation was administered by the endoscopy nurse        and supervised by the endoscopist. The following parameters were        monitored: oxygen saturation, heart rate, blood pressure, and response        to care. Total physician intraservice time was 18 minutes.   Impression:          - Normal examined duodenum.                        - Portal hypertensive gastropathy.                        - Non-bleeding grade III esophageal varices. Completely                        eradicated. Banded.                        - No specimens collected.   Recommendation:      - Perform a flexible sigmoidoscopy for further                        evaluation of the rectum and sigmoid colon today.                        - Continue IV octreotide                        - Continue antibiotics                        - BID PPI                        - Hepatology to continue following                        - Repeat upper endoscopy in 1 month for endoscopic band                        ligation.                                                                                       Armaan Adams MD      Flex Sig 10/31/2017 11:50 AM [address]., [place] [id number] [phone]      Endoscopy Department   _______________________________________________________________________________   Patient Name: Ivan Bell           Procedure Date: 10/31/2017 11:50 AM   MRN: [id number]                       Account Number: [id number]   YOB: 1965              Admit Type: Inpatient   Age: 52                                Gender: Male   Note Status: Finalized                Attending MD: Armaan Adams MD   Total Sedation Time:                     _______________________________________________________________________________       Procedure:           Flexible Sigmoidoscopy   Indications:         Hematochezia   Providers:           Armaan Adams MD, Lida Sky RN   Referring MD:        Lm May MD   Medicines:           See the other procedure note for documentation of the                        administered medications   Complications:       No immediate complications. Estimated blood loss: None.   _______________________________________________________________________________   Procedure:           Pre-Anesthesia Assessment:                        - Prior to the procedure, a History and Physical was                        performed, and patient medications and allergies were                        reviewed. The patient is competent. The risks and                        benefits of the procedure and the sedation options and                        risks were discussed with the patient. All questions                        were answered and informed consent was obtained. Patient                        identification and proposed procedure were verified by                        the physician and the nurse in the procedure room.                        Mental Status Examination: alert and oriented. Airway                        Examination: normal oropharyngeal airway and neck                        mobility. Respiratory Examination: clear to                        auscultation. CV  Examination: normal. Prophylactic                        Antibiotics: The patient does not require prophylactic                        antibiotics. Prior Anticoagulants: The patient has taken                        no previous anticoagulant or antiplatelet agents. ASA                        Grade Assessment: III - A patient with severe systemic                        disease. After reviewing the risks and benefits, the                        patient was deemed in satisfactory condition to undergo                        the procedure. The anesthesia plan was to use moderate                        sedation / analgesia (conscious sedation). Immediately                        prior to administration of medications, the patient was                        re-assessed for adequacy to receive sedatives. The heart                        rate, respiratory rate, oxygen saturations, blood                        pressure, adequacy of pulmonary ventilation, and                        response to care were monitored throughout the                        procedure. The physical status of the patient was                        re-assessed after the procedure.                        After obtaining informed consent, the scope was passed                        under direct vision. The Endoscope was introduced                        through the anus and advanced to 50 cm from the anal                        verge. The flexible sigmoidoscopy was accomplished                        without difficulty. The patient tolerated the procedure                        well. The quality of the bowel preparation was poor.                                                                                     Findings:        The perianal exam findings include non-thrombosed external hemorrhoids.        Normal mucosa was found at 50 cm proximal to the anus. No signs of        bleeding        Internal hemorrhoids were found during retroflexion.        10  mm, non-bleeding rectal varix was found on the right lateral wall        without any high risk stigmata. Not treated                                                                                     Moderate Sedation:        Moderate (conscious) sedation was administered by the endoscopy nurse        and supervised by the endoscopist. The following parameters were        monitored: oxygen saturation, heart rate, blood pressure, and response        to care. Total physician intraservice time was 3 minutes.   Impression:          - Preparation of the colon was poor.                        - Non-thrombosed external hemorrhoids found on perianal                        exam.                        - No signs of bleeding                        - Internal hemorrhoids.                        - Rectal varix without high risk stigmata.                        - No specimens collected.   Recommendation:      - Return patient to hospital [facility] for ongoing care.                        - If further evidence of hematochezia, would then                        consider full colonoscopy and possible treatment of                        rectal varix                        - See EGD report for further recommendations                        - Advance diet as tolerated.                                                                                       Armaan Adams MD

## 2017-10-31 NOTE — PROGRESS NOTES
Admission          10/30/2017  6:39 PM  -----------------------------------------------------------  Reason for admission:  Primary team notified of pt arrival.  Admitted from: [facility]  Via: stretcher  Accompanied by: family  Belongings: Placed in closet; valuables sent home with family  Admission Profile: complete  Teaching: orientation to unit and call light- call light within reach, call don't fall, use of console, meal times, when to call for the RN, and enforced importance of safety   Access: PIV  Telemetry:Placed on pt  Ht./Wt.: complete  2 RN Skin Assessment Completed By: Kenneth PERLA and Ángela PERLA  Pt status: A&Ox4, complains of pain in leg.     Temp:  [98.9  F (37.2  C)] 98.9  F (37.2  C)  Heart Rate:  [105] 105  Resp:  [16] 16  BP: (105)/(64) 105/64  SpO2:  [95 %] 95 %

## 2017-10-31 NOTE — PHARMACY-ADMISSION MEDICATION HISTORY
"Admission medication history interview status for the 10/30/2017 admission is complete. See Epic admission navigator for allergy information, pharmacy, prior to admission medications and immunization status.     Medication history interview sources:  Patient and [facility]    Changes made to PTA medication list (reason)  Added: none  Deleted: Spironolactone 100 mg qday  Changed: [employer] \"energy booster\" to B6, Sildenafil dose/directions,     Additional medication history information (including reliability of information, actions taken by pharmacist):  Patient was fairly good historian. Knew specific doses of some meds w/o prompting.  APAP-not sure how many/what dose he takes. Takes about 1-2 times per  Week.  Sildenafil-Says he takes 1/2 a tab every day (per [place] [facility] Sildenafil 20 mg-take 1-3 tablets per day prn. Last filled 4/11/17 for 90 d.s.  Spironolactone-Says he hasn't taken for months. However last sold per [place] on 10/11/17 for 30 d.s.  \"energy booster\" is B6 per patient.   Also states he has been taking a pain med for his leg, however is sounds like he received this in the hospital, not from an outpatient pharmacy.  Benadryl allergy/AE-makes him fell \"weird\" \"it's really bad\".   Last flu shot per [facility] 10/3/16        Prior to Admission medications    Medication Sig Last Dose Taking? Auth Provider   SILDENAFIL CITRATE PO Take 10 mg by mouth daily 10/30/2017 at Unknown time Yes Unknown, Entered By History   Pyridoxine HCl (VITAMIN B6 PO) Take by mouth daily  Yes Unknown, Entered By History   lactulose (CHRONULAC) 10 GM/15ML solution Take 15 g by mouth 2 times daily  10/30/2017 at Unknown time Yes Reported, Patient   Acetaminophen (TYLENOL PO) Take by mouth every 4 hours as needed for mild pain or fever 10/30/2017 at Unknown time Yes Reported, Patient   MULTIPLE VITAMINS PO Take 1 tablet by mouth daily 10/30/2017 at Unknown time Yes Reported, Patient         Medication history completed by: Mely Paige " PD4 Student

## 2017-10-31 NOTE — PROGRESS NOTES
[facility] Progress Note      Patient: Ivan Bell  Date of Admission: 10/30/2017  Hospital Day: # 1    Date of Service: 10/31/2017    Assessment & Plan:  Ivan Bell is a 52 year old male admitted on 10/30/2017. He has a history of alcoholic cirrhosis c/b ascites and esophageal varices admitted for BRBPR in setting of recent therapeutic paracentesis also with concern for SBP given fever upon admission.     # upper GI bleed  # acute blood loss anemia  2 episodes of BRBPR.  Baseline Hgb ~12. Hgb drop 8.8 to 6.5 in ED. EGD 3/2016 with grade I esophageal varices. S/p 3 units of pRBC's.  Hgb stabilized.  EGD completed by GI on 10/31 and notable for portal hypertensive gastropathy and grade II esophageal varices that were banded x 5.  Varices with whale sign.  Flex sig also completed and with rectal varix without thomas risk stigmata.    - GI consulted, appreciate recs  - continue protonix gtt  - continue octreotide gtt  - vitamin K 10 mg IV x 3 doses  - monitor hgb Q12H overnight  - treatment of SBP below  - holding PTA aldactone    # SBP  Diagnostic paracentesis upon admission with 7800 WBCs and 74% neutrophils.   - follow culture  - continue ceftriaxone 2g daily  - albumin 1.5g/kg IV day 1, repeat 1g/kg scheduled for 11/01    # streptococcus bacteremia   2/2 blood cultures positive on 10/30.   - continue vancomycin  - repeat cultures  - follow speciation and sensitivities     # portal venous thrombus   CT abd with thrombosis of left portal vein and anterior branch of right portal vein. Nonocclusive thrombus noted in main portal vein. Also, with evidence of new infarct involving anterosuperior spleen. This is likely chronic and at least not acutely related to ascites as ascites has been chronic.  - GI consulted, appreciate recs  - avoiding anticoagulation in setting of GI bleed     # EtOH cirrhosis c/b HE  Diagnosed many years ago after liver bx at [facility] which showed TOMAS cirrhosis, subsequently followed  up with [facility] GI for short time. Patient also with long history of alcohol use, although reports no alcohol use currently.  No signs of HE at this time.   - GI following, appreciate recs  - holding PTA lactulose given bleeding  - holding PTA aldactone with bleeding   - trend MELD labs  MELD-Na score: 26 at 10/31/2017  5:59 AM  MELD score: 24 at 10/31/2017  5:59 AM  Calculated from:  Serum Creatinine: 1.18 mg/dL at 10/31/2017  5:59 AM  Serum Sodium: 134 mmol/L at 10/31/2017  5:59 AM  Total Bilirubin: 3.5 mg/dL at 10/31/2017  5:59 AM  INR(ratio): 2.55 at 10/31/2017  5:59 AM  Age: 52 years    # left lower extremity pain and rash  Tenderness and mild erythema on medial right thigh.  CK level 53.  CT of the leg without acute findings.   Possible shingles?  - consider biopsy in AM    FEN: clear liquid w/ banding  GI PPx: protonix gtt  DVT PPx: no chemical given variceal bleed  CODE: full code  Disposition: pending stability of hemoglobin and off drips    Patient was seen and discussed with Dr. May.     Precious Luke MD, MS  Internal Medicine, PGY-2  Pager: [phone]    ___________________________________________________________________    Subjective:  Single episode of melena since admission.  Denies abdominal pain.  Notes significant discomfort on inside of left leg.  No extension of the area of redness.  Hemoglobin stable this AM.  Nursing notes reviewed.     Medications:  Current Facility-Administered Medications   Medication     HYDROmorphone (PF) (DILAUDID) injection 0.3-0.5 mg     vancomycin (VANCOCIN) 1,500 mg in NaCl 0.9 % 250 mL intermittent infusion     naloxone (NARCAN) injection 0.1-0.4 mg     lidocaine 1 % 1 mL     lidocaine (LMX4) kit     sodium chloride (PF) 0.9% PF flush 3 mL     sodium chloride (PF) 0.9% PF flush 3 mL     ondansetron (ZOFRAN-ODT) ODT tab 4 mg    Or     ondansetron (ZOFRAN) injection 4 mg     octreotide (sandoSTATIN) 1,250 mcg in NaCl 0.9 % 250 mL     pantoprazole (PROTONIX) 80 mg in NaCl 0.9 %  "100 mL infusion     [START ON 11/1/2017] albumin human 25 % injection 75 g     cefTRIAXone (ROCEPHIN) 2 g vial to attach to  ml bag for ADULTS or NS 50 ml bag for PEDS     phytonadione (AQUA-MEPHYTON) 10 mg in NaCl 0.9 % 50 mL intermittent infusion       Vitals:  Blood pressure 115/65, pulse 82, temperature 97.8  F (36.6  C), temperature source Oral, resp. rate 18, height 1.778 m (5' 10\"), weight 69.9 kg (154 lb 3.2 oz), SpO2 98 %.    Physical Exam:  General  - sitting up in bed, no acute distress  HEENT - NC/AT, no scleral icterus, mmm  Cardiovascular - RRR, normal S1/S2, no murmur appreciated  Pulmonary - CTAB, no wheezing or crackles appreciated, normal respiratory effort on room air  Abdomen - soft, non-tender, mild distension   Extremities - warm and well perfused, no peripheral edema in bilateral lower extremities  Neurologic - alert and oriented, no gross focal deficits, no asterixis  Psych - normal mood and affect  Skin - erythematous area on medial side of left leg    Labs:  Reviewed.     Microbiology:   Reviewed.     Imaging:   Reviewed.    "

## 2017-10-31 NOTE — H&P
[facility], [place]    Internal Medicine History and Physical - [facility] Service       Date of Admission:  10/30/2017    Chief Complaint    BRBPR, fever    History is obtained from the patient    History of Present Illness   Ivan Bell is a 52 year old male with PMH significant for alcoholic cirrhosis complicated by ascites and history of encephalopathy who presents to the ED with BRBPR and fever. He was seen in the ED early AM 10/30  with abdominal pain and distension and had a therapeutic paracentesis performed (on 10/30) with 8.6L out. Fluid at that time not sent for diagnostic studies. He returned to the ED later in day with BRBPR, fever (101.2 in the ED), and left leg pain.     He describes red bloody diarrhea that started this morning with last BM at 1600. He also notes cutting back on his lactulose from 30g to 15g due to diarrhea and abdominal cramping although failry asymptomatic today and reports no abd pain this AM, no SOB, hematemesis, or syncopal symptoms. Of note, he follows GI for alcoholic cirrhosis and most recently endoscopy 3/2016 revealed small esophageal varices and portal hypertensive gastropathy. Cscope performed 8/2016 revealing diverticulosis in sigmoid colon and 15mm polyp which was resected. That he can remember, he's not had any past episodes of GI bleeding.    In the ED, he was found to be febrile with Hgb drop from 8.8-6.5, elevated lactate (3.3) and mildly hypotensive. He was fluid resuscitated with 2L NS and 1u PRBC and started on ceftriaxone, PPi and octreotide gtt's. CT abd was obtained which was negative for ischemic colitis but did show portal vein thrombosis and new splenic infarct. DVT US was also done on left LE as he complained of significant pain with palpation behind left knee with some redness. US negative for DVT. Upon transfer to floor, he was pleasant, hemodynamically stable and without further bloody stool. 1 further unit of blood given for  hgb remaining 6.5 despite first unit and albumin dosed for SBP treatment.      Review of Systems   General: Suprisingly well appearing man in no acute distress lying in bed  Skin: negative for rash, itching, lumps or bumps, slight contusion of left leg behind knee  Respiratory: Mild SOB, no dyspnea on exertion, cough, or hemoptysis  Cardiovascular: negative for palpitations, tachycardia, irregular heart beat, chest pain and lower extremity edema  Gastrointestinal: negative for poor appetite, nausea, vomiting, reflux, abdominal pain, or constipation. Recent BRBPR  Genitourinary: negative for dysuria, frequency, urgency, hematuria, incontinence and vaginal discharge  Musculoskeletal: pain at left LLE  Neurologic:  Hematologic/Lymphatic/Immunologic: negative for fevers/chills, anemia, bleeding disorder, night sweats and weight loss  Endocrine: negative for cold intolerance and heat intolerance      Past Medical History    I have reviewed this patient's medical history and updated it with pertinent information if needed.   Past Medical History:   Diagnosis Date     Cirrhosis (H)      Hepatitis      Hypertension      Left calcaneus fracture 1/9/2006 January 16, 2006: Fell 10 feet from ladder onto left foot on frozen ground on 1/9/06 at home.  Immediate pain and unable to walk- seen at [place] and diagnosed with calcaneus fracture       Past Surgical History   I have reviewed this patient's surgical history and updated it with pertinent information if needed.  Past Surgical History:   Procedure Laterality Date     ANKLE SURGERY Left      COLONOSCOPY N/A 3/31/2016    Procedure: COLONOSCOPY;  Surgeon: Rhys Uriostegui MD;  Location:  GI     ESOPHAGOSCOPY, GASTROSCOPY, DUODENOSCOPY (EGD), COMBINED N/A 3/31/2016    Procedure: COMBINED ESOPHAGOSCOPY, GASTROSCOPY, DUODENOSCOPY (EGD);  Surgeon: Rhys Uriostegui MD;  Location:  GI     KNEE SURGERY Left      KNEE SURGERY Right        Social History    Home: Lives in [place], [place] ([facility]) with wife at home.   Work: In the [employer] business and continues to work frequently  Tobacco: Former smoker, Currently chews tobacco  EtOH: none currently  No drug use    Family History   I have reviewed this patient's family history and updated it with pertinent information if needed.   Family History   Problem Relation Age of Onset     Family History Negative Mother      Family History Negative Father      Hypertension Father      CEREBROVASCULAR DISEASE Father 87     Breast Cancer Maternal Grandmother      Rheumatoid Arthritis Daughter      Depression Daughter      Cancer - colorectal No family hx of      Prostate Cancer No family hx of      Liver Disease No family hx of        Prior to Admission Medications   Prior to Admission Medications   Prescriptions Last Dose Informant Patient Reported? Taking?   Acetaminophen (TYLENOL PO)  Self Yes No   Sig: Take by mouth every 4 hours as needed for mild pain or fever   MULTIPLE VITAMINS PO  Self Yes No   Sig: Take 1 tablet by mouth daily   UNABLE TO FIND  Self Yes No   Sig: Take 1 capsule by mouth every evening MEDICATION NAME: P6 (energy booster from [facility])   lactulose (CHRONULAC) 10 GM/15ML solution  Self Yes No   Sig: Take 15 g by mouth 2 times daily    sildenafil (REVATIO/VIAGRA) 20 MG tablet  Self No No   Sig: Take 1 tablet (20 mg) by mouth 3 times daily Take 1-3 po daily prn   spironolactone (ALDACTONE) 100 MG tablet  Self No No   Sig: Take 1 tablet (100 mg) by mouth daily      Facility-Administered Medications: None     Allergies   Allergies   Allergen Reactions     Benadryl [Diphenhydramine] Other (See Comments)     Delirium (visual and auditory hallucinations)       Physical Exam   Vital Signs: Temp: 98.9  F (37.2  C) Temp src: Oral BP: 105/64   Heart Rate: 105 Resp: 16 SpO2: 95 % O2 Device: None (Room air)    Weight: 0 lbs 0 oz    Exam:  Constitutional: alert, no distress and cooperative  Head: Normocephalic. No  masses, lesions, tenderness or abnormalities  Neck: Neck supple. No LAD. Thyroid symmetric, normal size, Carotids without bruits.  Cardiovascular: RRR. No murmurs, clicks gallops or rub, No edema or JVD.  Respiratory: Lungs CTAB, no wheezes or rales, Good diaphragmatic excursion.    Gastrointestinal: Abdomen soft, non-tender. BS normal. No masses, organomegaly  : Deferred  Musculoskeletal: extremities normal- no gross deformities noted, gait normal, normal muscle tone and normal range of motion of extremities  Skin: no suspicious lesions or rashes  Neurologic: CN II-XII intact. Gait normal. Reflexes normal and symmetric. Sensation grossly WNL.  Psychiatric: mentation appears normal and affect normal/bright  Hematologic/Lymphatic/Immunologic: Normal cervical lymph nodes      Assessment & Plan   Ivan Bell is a 52 year old male admitted on 10/30/2017. He has a history of alcoholic cirrhosis c/b ascites and esophageal varices admitted for BRBPR in setting of recent therapeutic paracentesis also with concern for SBP given fever upon admission.      # BRBPR  # Acute Blood Loss Anemia  # Likely UGI Bleed  Differential includes UGI variceal bleeding vs diverticular bleed vs Other. Baseline Hgb ~12. INR 2.76. Bloody stools x2 today with last stool at 1600. Hgb drop 8.8 to 6.5 in ED. EGD 3/2016 notes small esophageal varices. Colonoscopy 8/16 with 15mm polyp in cecum and diverticulosis in sigmoid colon requiring 1yr f/u although not yet done. In ED received 2L NS in ED and 1u pRBC. No bloody stools or melena upon admission to medicine floor.  - GI consulted, appreciate rec's. Will plan to see in AM with likely endoscopy tomorrow  - Started PPI gtt  - Started Octreotide gtt  - Started Ceftriaxone  - Vit K 10mg x3 days  - Trend Hgb q4h  - Transfusing for hgb <7      # SBP  Diagnostic paracentesis upon admission with 7800 WBCs and cloudy. Has had numerous recent therapeutic paracenteses most recent being early AM of 10/30  with 9L off.  - Ascites fluid culture pending, Gm stain without org's  - Started Ceftriaxone 2g/day  - Albumin 1.5g/kg IV today, repeat 1g/kg scheduled for 11/01  - Blood cultures x2 drawn: pending      # Hypotension  MAPs ~60-65 upon admission. Patient appears to have SBP in 140s on outpatient visits. LA 3.3 on admission down to 2.3 and then up to 2.6. Fluid resuscitation in ED as well as albumin dosed upon admission. Patient otherwise stable and mentating appropriately  - Holding pta spironolactone  - Trend Lactic Acid (downtrending despite MAPs ~60)  - Consider further fluid bolus as needed      # Portal Venous Thrombus   CT abd with thrombosis of left portal vein and anterior branch of right portal vein. Nonocclusive thrombus noted in main portal vein. Also with evidence of new infarct involving anterosuperior spleen. This is likely chronic and at least not acutely related to ascites as ascites has been chronic.  - GI following, appreciate rec's  - Avoiding anticoagulation in setting of GI bleed      # EtOH Cirrhosis   # Hepatic Encephalopathy  Diagnosed many years ago after liver bx at [facility] which showed TOMAS cirrhosis, subsequently followed up with [facility] GI for short time. Patient also with long history of alcohol use, although reports no alcohol use currently.  - GI following, appreciate rec's  - Hold pta lactulose 15g qday (recently decreased from 30g due to cramping abd pain) with plan to restart when hgb stable without further bleeding.  - Trend MELD labs    MELD-Na score: 25 at 10/30/2017  8:06 PM  MELD score: 23 at 10/30/2017  8:06 PM  Calculated from:  Serum Creatinine: 1.15 mg/dL at 10/30/2017  8:06 PM  Serum Sodium: 133 mmol/L at 10/30/2017  8:06 PM  Total Bilirubin: 2.3 mg/dL at 10/30/2017  1:45 PM  INR(ratio): 2.76 at 10/30/2017  8:06 PM  Age: 52 years      # LLE pain  Complains of vague, left posterior leg pain behind the knee. No recollection of trauma or inciting event. US DVT of leg  negative. Some erythema with significant tenderness on palpation. Unlikely infectious given physical exam appearance. Possible hematoma though not picked up on US.   - Dilaudid prn for pain  - Continue to monitor  - CK level 53  - CT left femur pending      Diet: clear liquid  Fluids: 2L given in ED, Albumin 1.5g/kg IV 10/30  DVT Prophylaxis: Pneumatic Compression Devices  Code Status: Full Code      Disposition Plan   Expected discharge: 4 - 7 days; recommended to prior living arrangement once hemoglobin stable and SIRS/Sepsis treated.     Entered: Basim Jimenez 10/30/2017, 7:05 PM   Information in the above section will display in the discharge planner report.    The patient was discussed with Dr. Vincent Jimenez MD  Internal Medicine, PGY1  Pager: [phone]    Data     Recent Labs  Lab 10/30/17  2006 10/30/17  1345 10/30/17  0035   WBC  --  8.5 11.3*   HGB 6.5* 6.5* 8.8*   MCV  --  85 83   PLT  --  120* 159   INR 2.76*  --  1.83*    133 134   POTASSIUM 4.7 4.4 4.6   CHLORIDE 106 104 105   CO2 19* 21 19*   BUN 26 25 23   CR 1.15 1.11 1.24   ANIONGAP 8 8 10   JULISSA 7.2* 7.9* 8.0*   * 127* 113*   ALBUMIN  --  2.7* 2.3*   PROTTOTAL  --  6.0* 6.3*   BILITOTAL  --  2.3* 2.7*   ALKPHOS  --  163* 221*   ALT  --  17 19   AST  --  29 38     Recent Results (from the past 24 hour(s))   US Lower Extremity Venous Duplex Left    Narrative    VENOUS ULTRASOUND LEFT LEG  10/30/2017 2:38 PM     HISTORY: Evaluate for DVT versus superficial thrombophlebitis.,  History of alcoholic cirrhosis, status post paracentesis  therapeutically.  Fever, leg pain    COMPARISON: None.    FINDINGS:  Examination of the deep veins with graded compression and  color flow Doppler with spectral wave form analysis was performed.      Impression    IMPRESSION: No evidence of deep venous thrombosis.  Probable ascites  in the right inguinal region.    TRE MARTINEZ MD   CT Abdomen Pelvis w Contrast    Narrative    CT ABDOMEN AND  PELVIS WITH CONTRAST   10/30/2017 3:33 PM     HISTORY: Left lower quadrant abdominal pain. Fever, bloody stools.  Status post paracentesis. Evaluate for ischemic versus other acute  process.    TECHNIQUE:   CT of the abdomen and pelvis was performed following the  administration of 77 mL Isovue 370. Radiation dose for this scan was  reduced using automated exposure control, adjustment of the mA and/or  kV according to patient size, or iterative reconstruction technique.    COMPARISON: CT of the abdomen and pelvis dated 2/25/2016.    FINDINGS: The colon appears grossly unremarkable. There is a small  hiatal hernia.    The liver is atrophic with nodular contour consistent with cirrhosis.  There appears to be thrombosis of the left portal vein and anterior  branch of the right portal vein. There is nonocclusive thrombus in the  main portal vein to its junction with the superior mesenteric vein.  These findings are new. There are distal esophageal varices.    The spleen is enlarged with a maximum length of approximately 16 cm  with the upper limits of normal being 13 mm. There is a new  wedge-shaped area of decreased perfusion in the anterosuperior  portions of the spleen most consistent with an infarct.    There are multiple nonobstructing stones in the kidneys bilaterally.  There also appears to be a 5 mm stone in the mid distal left ureter.  No significant left hydroureter is noted.    The pancreas and adrenal glands appear unremarkable.    There are multiple gallstones.    There is a moderate amount of ascites in the abdomen and pelvis.      Impression    IMPRESSION:  1. Cirrhosis of the liver. Thrombosis of the left portal vein and  anterior branch of the right portal vein. Nonocclusive thrombus is  noted in the main portal vein as above. Distal esophageal varices.  2. Splenomegaly with evidence of a new infarct involving the  anterosuperior spleen.  3. Moderate amount of ascites.  4. Nephrolithiasis bilaterally.  There also appears to be a stone in  the mid distal left ureter. No significant left hydroureter or  hydronephrosis.  5. Gallstones.    TRE MARTINEZ MD       Attending Attestation   This patient has been seen and evaluated by me, Vincent Rick MD.  I have discussed the patient and today's care plan with the house staff team and agree with the findings and plan in this note and any edits by me are indicated above in blue.      I have reviewed today's care team notes, Medications, Vital Signs, Labs and Imaging    Vincent Rick MD  Med-Peds Hospitalist  Pager [phone]

## 2017-10-31 NOTE — PLAN OF CARE
Problem: Gastrointestinal Bleeding (Adult)  Goal: Signs and Symptoms of Listed Potential Problems Will be Absent, Minimized or Managed (Gastrointestinal Bleeding)  Signs and symptoms of listed potential problems will be absent, minimized or managed by discharge/transition of care (reference Gastrointestinal Bleeding (Adult) CPG).   Outcome: No Change  Pt passed small BM maroon in color. Previous stools reported as bloody/red. 2 units total of prbc given with hgb recheck as 8.0. LS decreased in bases. Cross cover monitoring BP changes. Pt asymptomatic with lower BP. Denies feeling light headed or any changes in sensation. A/O x 4. NPO for US in AM.

## 2017-10-31 NOTE — PLAN OF CARE
Problem: Patient Care Overview  Goal: Plan of Care/Patient Progress Review  Outcome: Improving  Neuro: A&Ox4.   Cardiac: SR. VSS. /66             Respiratory: Sating 98% on RA.  GI/: Adequate urine output. BM X1 Brown/Slightly maroon, and 3 tap water enemas for Flex sig prep.  Diet/appetite: NPO except meds  Activity:  Stand by Assist to the bathroom.  Pain: At acceptable level on current regimen. Left upper leg, painful to touch and with walking.  Skin: Reddened area on Left thigh area.  LDA's: PIV x2 Octreotide and Protonix gtt running.  Trending Hgb now 8.9. Pt went down for Upper endoscopy and Flex sig.  Plan: Continue with POC. Notify primary team with changes.

## 2017-10-31 NOTE — OR NURSING
Procedure: EGD with 5 bands and no interventions in the flex sig  Sedation: Concious  O2: 2l  Tolerated Procedure: Fair  Patient returned to recovery room in stable condition with RN  Other: Report called to Carin on Floor RN 6B  Lida Sky RN

## 2017-11-01 DIAGNOSIS — K70.31 ALCOHOLIC CIRRHOSIS OF LIVER WITH ASCITES (H): Primary | ICD-10-CM

## 2017-11-01 LAB
ALBUMIN SERPL-MCNC: 2.7 G/DL (ref 3.4–5)
ALP SERPL-CCNC: 113 U/L (ref 40–150)
ALT SERPL W P-5'-P-CCNC: 11 U/L (ref 0–70)
ANION GAP SERPL CALCULATED.3IONS-SCNC: 8 MMOL/L (ref 3–14)
AST SERPL W P-5'-P-CCNC: 26 U/L (ref 0–45)
BILIRUB SERPL-MCNC: 2.4 MG/DL (ref 0.2–1.3)
BUN SERPL-MCNC: 22 MG/DL (ref 7–30)
CALCIUM SERPL-MCNC: 7.7 MG/DL (ref 8.5–10.1)
CHLORIDE SERPL-SCNC: 109 MMOL/L (ref 94–109)
CO2 SERPL-SCNC: 18 MMOL/L (ref 20–32)
CREAT SERPL-MCNC: 0.97 MG/DL (ref 0.66–1.25)
ERYTHROCYTE [DISTWIDTH] IN BLOOD BY AUTOMATED COUNT: 17.3 % (ref 10–15)
GFR SERPL CREATININE-BSD FRML MDRD: 80 ML/MIN/1.7M2
GLUCOSE SERPL-MCNC: 179 MG/DL (ref 70–99)
HCT VFR BLD AUTO: 25 % (ref 40–53)
HGB BLD-MCNC: 7.6 G/DL (ref 13.3–17.7)
HGB BLD-MCNC: 8 G/DL (ref 13.3–17.7)
INR PPP: 1.89 (ref 0.86–1.14)
MAGNESIUM SERPL-MCNC: 2.2 MG/DL (ref 1.6–2.3)
MCH RBC QN AUTO: 27.5 PG (ref 26.5–33)
MCHC RBC AUTO-ENTMCNC: 32 G/DL (ref 31.5–36.5)
MCV RBC AUTO: 86 FL (ref 78–100)
PHOSPHATE SERPL-MCNC: 1.6 MG/DL (ref 2.5–4.5)
PHOSPHATE SERPL-MCNC: 1.7 MG/DL (ref 2.5–4.5)
PLATELET # BLD AUTO: 80 10E9/L (ref 150–450)
POTASSIUM SERPL-SCNC: 3.7 MMOL/L (ref 3.4–5.3)
PROT SERPL-MCNC: 5.3 G/DL (ref 6.8–8.8)
RBC # BLD AUTO: 2.91 10E12/L (ref 4.4–5.9)
SODIUM SERPL-SCNC: 136 MMOL/L (ref 133–144)
WBC # BLD AUTO: 4.7 10E9/L (ref 4–11)

## 2017-11-01 PROCEDURE — 25000128 H RX IP 250 OP 636: Performed by: INTERNAL MEDICINE

## 2017-11-01 PROCEDURE — S0164 INJECTION PANTROPRAZOLE: HCPCS | Performed by: INTERNAL MEDICINE

## 2017-11-01 PROCEDURE — 25000128 H RX IP 250 OP 636: Performed by: HOSPITALIST

## 2017-11-01 PROCEDURE — 83735 ASSAY OF MAGNESIUM: CPT | Performed by: HOSPITALIST

## 2017-11-01 PROCEDURE — 25000125 ZZHC RX 250: Performed by: INTERNAL MEDICINE

## 2017-11-01 PROCEDURE — 36415 COLL VENOUS BLD VENIPUNCTURE: CPT | Performed by: HOSPITALIST

## 2017-11-01 PROCEDURE — 12000006 ZZH R&B IMCU INTERMEDIATE UMMC

## 2017-11-01 PROCEDURE — 85027 COMPLETE CBC AUTOMATED: CPT | Performed by: HOSPITALIST

## 2017-11-01 PROCEDURE — P9047 ALBUMIN (HUMAN), 25%, 50ML: HCPCS | Performed by: INTERNAL MEDICINE

## 2017-11-01 PROCEDURE — 99233 SBSQ HOSP IP/OBS HIGH 50: CPT | Mod: GC | Performed by: INTERNAL MEDICINE

## 2017-11-01 PROCEDURE — 80053 COMPREHEN METABOLIC PANEL: CPT | Performed by: HOSPITALIST

## 2017-11-01 PROCEDURE — 85018 HEMOGLOBIN: CPT | Performed by: HOSPITALIST

## 2017-11-01 PROCEDURE — 25000125 ZZHC RX 250: Performed by: HOSPITALIST

## 2017-11-01 PROCEDURE — 85610 PROTHROMBIN TIME: CPT | Performed by: HOSPITALIST

## 2017-11-01 PROCEDURE — 25000132 ZZH RX MED GY IP 250 OP 250 PS 637: Performed by: STUDENT IN AN ORGANIZED HEALTH CARE EDUCATION/TRAINING PROGRAM

## 2017-11-01 PROCEDURE — 84100 ASSAY OF PHOSPHORUS: CPT | Performed by: HOSPITALIST

## 2017-11-01 RX ORDER — ACETAMINOPHEN 325 MG/1
325-650 TABLET ORAL ONCE
Status: COMPLETED | OUTPATIENT
Start: 2017-11-01 | End: 2017-11-01

## 2017-11-01 RX ORDER — ALBUMIN (HUMAN) 12.5 G/50ML
25 SOLUTION INTRAVENOUS ONCE
Status: COMPLETED | OUTPATIENT
Start: 2017-11-01 | End: 2017-11-01

## 2017-11-01 RX ORDER — SPIRONOLACTONE 100 MG/1
100 TABLET, FILM COATED ORAL DAILY
Refills: 0 | Status: ON HOLD | COMMUNITY
Start: 2017-10-11 | End: 2017-11-03

## 2017-11-01 RX ADMIN — PHYTONADIONE 10 MG: 10 INJECTION, EMULSION INTRAMUSCULAR; INTRAVENOUS; SUBCUTANEOUS at 00:46

## 2017-11-01 RX ADMIN — ALBUMIN (HUMAN) 25 G: 12.5 SOLUTION INTRAVENOUS at 10:26

## 2017-11-01 RX ADMIN — CEFTRIAXONE SODIUM 2 G: 2 INJECTION, POWDER, FOR SOLUTION INTRAMUSCULAR; INTRAVENOUS at 16:20

## 2017-11-01 RX ADMIN — ALBUMIN (HUMAN) 50 G: 12.5 SOLUTION INTRAVENOUS at 09:09

## 2017-11-01 RX ADMIN — POTASSIUM PHOSPHATE, MONOBASIC AND POTASSIUM PHOSPHATE, DIBASIC 20 MMOL: 224; 236 INJECTION, SOLUTION INTRAVENOUS at 11:08

## 2017-11-01 RX ADMIN — ACETAMINOPHEN 650 MG: 325 TABLET, FILM COATED ORAL at 19:38

## 2017-11-01 RX ADMIN — PANTOPRAZOLE SODIUM 8 MG/HR: 40 INJECTION, POWDER, FOR SOLUTION INTRAVENOUS at 03:46

## 2017-11-01 RX ADMIN — PANTOPRAZOLE SODIUM 8 MG/HR: 40 INJECTION, POWDER, FOR SOLUTION INTRAVENOUS at 12:13

## 2017-11-01 RX ADMIN — POTASSIUM PHOSPHATE, MONOBASIC AND POTASSIUM PHOSPHATE, DIBASIC 20 MMOL: 224; 236 INJECTION, SOLUTION INTRAVENOUS at 23:39

## 2017-11-01 RX ADMIN — VANCOMYCIN HYDROCHLORIDE 1500 MG: 10 INJECTION, POWDER, LYOPHILIZED, FOR SOLUTION INTRAVENOUS at 06:16

## 2017-11-01 RX ADMIN — PANTOPRAZOLE SODIUM 8 MG/HR: 40 INJECTION, POWDER, FOR SOLUTION INTRAVENOUS at 22:25

## 2017-11-01 RX ADMIN — PHYTONADIONE 10 MG: 10 INJECTION, EMULSION INTRAMUSCULAR; INTRAVENOUS; SUBCUTANEOUS at 22:21

## 2017-11-01 RX ADMIN — ACETAMINOPHEN 325 MG: 325 TABLET, FILM COATED ORAL at 01:33

## 2017-11-01 RX ADMIN — VANCOMYCIN HYDROCHLORIDE 1500 MG: 10 INJECTION, POWDER, LYOPHILIZED, FOR SOLUTION INTRAVENOUS at 17:41

## 2017-11-01 ASSESSMENT — ACTIVITIES OF DAILY LIVING (ADL)
ADLS_ACUITY_SCORE: 13
ADLS_ACUITY_SCORE: 10
ADLS_ACUITY_SCORE: 12

## 2017-11-01 ASSESSMENT — PAIN DESCRIPTION - DESCRIPTORS
DESCRIPTORS: ACHING
DESCRIPTORS: ACHING

## 2017-11-01 NOTE — PROGRESS NOTES
[facility]  WOC Nurse Inpatient Adult Pressure INJURY (PI) Wound Assessment     Initial assessment of PU(s) on pt's:   Coccyx    Data:   Patient History:      per MD note(s):  Ivan Bell is a 52 year old male with PMH significant for alcoholic cirrhosis complicated by ascites and history of encephalopathy who presents to the ED with BRBPR and fever. He was seen in the ED early AM 10/30  with abdominal pain and distension and had a therapeutic paracentesis performed (on 10/30) with 8.6L out. Fluid at that time not sent for diagnostic studies. He returned to the ED later in day with BRBPR, fever (101.2 in the ED), and left leg pain.      He describes red bloody diarrhea that started this morning with last BM at 1600. He also notes cutting back on his lactulose from 30g to 15g due to diarrhea and abdominal cramping although failry asymptomatic today and reports no abd pain this AM, no SOB, hematemesis, or syncopal symptoms. Of note, he follows GI for alcoholic cirrhosis and most recently endoscopy 3/2016 revealed small esophageal varices and portal hypertensive gastropathy. Cscope performed 8/2016 revealing diverticulosis in sigmoid colon and 15mm polyp which was resected. That he can remember, he's not had any past episodes of GI bleeding.     In the ED, he was found to be febrile with Hgb drop from 8.8-6.5, elevated lactate (3.3) and mildly hypotensive. He was fluid resuscitated with 2L NS and 1u PRBC and started on ceftriaxone, PPi and octreotide gtt's. CT abd was obtained which was negative for ischemic colitis but did show portal vein thrombosis and new splenic infarct. DVT US was also done on left LE as he complained of significant pain with palpation behind left knee with some redness. US negative for DVT. Upon transfer to floor, he was pleasant, hemodynamically stable and without further bloody stool. 1 further unit of blood given for hgb remaining 6.5 despite first unit and albumin dosed for SBP  treatment.         Tanmay Assessment and sub scores:   Tanmay Score  Av.1  Min: 17  Max: 18    Positioning: Mepilex dressing,     Mattress:  Standard , Atmos Air    Moisture Management:  N/A    Current Diet / Nutrition:           Active Diet Order      Clear Liquid Diet  Labs:   Recent Labs   Lab Test  17   0618   ALBUMIN  2.7*   HGB  8.0*   INR  1.89*   WBC  4.7                                                                                                                        Pressure Injury Assessment  (location #1Coccyx )    Wound History:   Patient admitted with a stage 2 community acquired pressure injury          Wound Base:is clean with dermal base , moist    Specific Dimensions (length x width x depth, in cm) :  0.5 cm x 0.5 cm x 0.2 cm     Tunneling:  N/A    Undermining: N/A    Palpation of the wound bed:  normal    Slough appearance:  none    Eschar appearance:  none    Periwound Skin: erythema,      Color: pink    Temperature  normal     Drainage:  Scant          Odor: none    Pain:  minimal , tender         Intervention:     Patient's chart evaluated.      Tanmay Interventions:  Current Tanmay Interventions and Care Plan reviewed and updated, appropriate at this time.    Wound was fully assessed.    Wound Care: was done: Removal of existing dressing    Cleansing with microklenz solution    Application of clean dressing,    Orders  Written    Supplies  Ordered: Mepilex    Discussed plan of care with Patient           Assessment:     Pressure Injury (PI) located on Coccyx: 2    Status: wound  initial assessment, Stable    Present on admission         Plan:     Nursing to notify the Provider(s) and re-consult the [facility] Nurse if wound(s) deteriorate(s).  Plan of care for wound located on Coccyx:Cleanse with MicroKlenz moisten gauze   Pat dry. Apply no sting barrier film to th tea wound skin allow to dry   Cover with  4 x4 Mepilex dressing    Change every other day and as needed          [facility]  Nurse will return: Weekly  Face to face time: 20 Minutes

## 2017-11-01 NOTE — PROGRESS NOTES
"[facility]  HEPATOLOGY PROGRESS NOTE  Ivan Bell [id number]       CC: fevers, BRBPR  SUBJECTIVE:  Has appetite and would like to eat more. He denies nausea, vomiting, fevers, hematemesis, abdominal pain or chest pain. No further bright red blood per rectum. He is anxious to leave the hospital in a short period of time.     ROS:  A 10-point review of systems was negative.    OBJECTIVE:  VS: /67 (BP Location: Right arm)  Pulse 91  Temp 98.3  F (36.8  C) (Oral)  Resp 20  Ht 1.778 m (5' 10\")  Wt 68.7 kg (151 lb 8 oz)  SpO2 99%  BMI 21.74 kg/m2   General: In no acute distress, mild facial muscle wasting  Neuro: AOx3, No asterixis  Lymph: No cervical lymphadenopathy  HEENT:  Noscleral icterus, Nooral lesions  CV: S1/N9gtodpau murmurs. Skin warm and dry  Lungs: clear to auscultation Respirations even and nonlabored on room air  Abd: Soft, nontender, nondistended. +BS  Extrem: No peripehral edema  Skin: No jaundice  Psych: Pleasant, appropriate.     MEDICATIONS:  Current Facility-Administered Medications   Medication     potassium phosphate 15 mmol in D5W 250 mL intermittent infusion     potassium phosphate 20 mmol in D5W 500 mL intermittent infusion     potassium phosphate 20 mmol in D5W 250 mL intermittent infusion     potassium phosphate 25 mmol in D5W 500 mL intermittent infusion     HYDROmorphone (PF) (DILAUDID) injection 0.3-0.5 mg     vancomycin (VANCOCIN) 1,500 mg in NaCl 0.9 % 250 mL intermittent infusion     naloxone (NARCAN) injection 0.1-0.4 mg     lidocaine 1 % 1 mL     lidocaine (LMX4) kit     sodium chloride (PF) 0.9% PF flush 3 mL     sodium chloride (PF) 0.9% PF flush 3 mL     ondansetron (ZOFRAN-ODT) ODT tab 4 mg    Or     ondansetron (ZOFRAN) injection 4 mg     octreotide (sandoSTATIN) 1,250 mcg in NaCl 0.9 % 250 mL     pantoprazole (PROTONIX) 80 mg in NaCl 0.9 % 100 mL infusion     cefTRIAXone (ROCEPHIN) 2 g vial to attach to  ml bag for ADULTS or NS 50 ml bag for PEDS     " phytonadione (AQUA-MEPHYTON) 10 mg in NaCl 0.9 % 50 mL intermittent infusion       REVIEW OF LABORATORY, PATHOLOGY AND IMAGING RESULTS:  BMP  Recent Labs  Lab 11/01/17  0618 10/31/17  0559 10/30/17  2006 10/30/17  1345    134 133 133   POTASSIUM 3.7 4.3 4.7 4.4   CHLORIDE 109 106 106 104   JULISSA 7.7* 7.7* 7.2* 7.9*   CO2 18* 22 19* 21   BUN 22 29 26 25   CR 0.97 1.18 1.15 1.11   * 102* 103* 127*     CBC  Recent Labs  Lab 11/01/17  0618  10/31/17  0559  10/30/17  1345 10/30/17  0035   WBC 4.7  --  6.0  --  8.5 11.3*   RBC 2.91*  --  2.91*  --  2.47* 3.28*   HGB 8.0*  < > 8.0*  < > 6.5* 8.8*   HCT 25.0*  --  25.0*  --  20.9* 27.2*   MCV 86  --  86  --  85 83   MCH 27.5  --  27.5  --  26.3* 26.8   MCHC 32.0  --  32.0  --  31.1* 32.4   RDW 17.3*  --  16.8*  --  17.2* 17.2*   PLT 80*  --  70*  --  120* 159   < > = values in this interval not displayed.  INR  Recent Labs  Lab 11/01/17  0618 10/31/17  0559 10/30/17  2006 10/30/17  0035   INR 1.89* 2.55* 2.76* 1.83*     LFTs  Recent Labs  Lab 11/01/17  0618 10/31/17  0559 10/30/17  1345 10/30/17  0035   ALKPHOS 113 106 163* 221*   AST 26 24 29 38   ALT 11 14 17 19   BILITOTAL 2.4* 3.5* 2.3* 2.7*   PROTTOTAL 5.3* 5.9* 6.0* 6.3*   ALBUMIN 2.7* 3.4 2.7* 2.3*      PANCNo lab results found in last 7 days.   MELD-Na score: 18 at 11/1/2017  6:18 AM  MELD score: 17 at 11/1/2017  6:18 AM  Calculated from:  Serum Creatinine: 0.97 mg/dL (Rounded to 1) at 11/1/2017  6:18 AM  Serum Sodium: 136 mmol/L at 11/1/2017  6:18 AM  Total Bilirubin: 2.4 mg/dL at 11/1/2017  6:18 AM  INR(ratio): 1.89 at 11/1/2017  6:18 AM  Age: 52 years      Imaging Results:  US doppler 10/31/17  Impression:   1.  Cirrhotic configuration of the liver parenchyma. No focal liver  lesions.  2.  Bidirectional flow in the main portal vein at the kayla hepatis  with nonocclusive intraluminal thrombus.  3.  Occluded left portal vein, with no evidence of flow in the present  study.  4.  The right portal vein  demonstrates retrograde flow. The anterior  branch of the right portal vein is occluded with no evidence of flow.  5.  Nonobstructive right renal stones.  6.  Moderate volume of ascites.    EGD 10/31/17  Impression:          - Normal examined duodenum.                        - Portal hypertensive gastropathy.                        - Non-bleeding grade III esophageal varices. Completely                        eradicated. Banded.     Flex Sig 10/31/17  Impression:          - Preparation of the colon was poor.                        - Non-thrombosed external hemorrhoids found on perianal                        exam.                        - No signs of bleeding                        - Internal hemorrhoids.                        - Rectal varix without high risk stigmata.     IMPRESSION:  Ivan Bell is a 52 year old male with a history of alcohol cirrhosis complicated by EV, HE, non occlusive PVT who was admitted with fevers and BRBPR. His paracentesis did have high PMN's of 5700 and he underwent EGD with banding x5. No clear source of bleeding noted but high risk stigmata of EV bleeding.     RECOMMENDATIONS:  1. Large esophageal varices  2. Rectal varix  - currently on octreotide and ppi to complete 72 hours.   - varices banded x5, may advance diet, first with soft  - hemoglobin stable and no further evidence of bleeding.   - will need repeat EGD in ~ 1 month.  (ordered and note sent to schedulers)  - if he has BRBPR occur, he will need repeat colonoscopy to evaluate rectal varix bleeding.    3.  SBP  4. Ascites  - on ceftriaxone and awaiting cultures, continue for at least 5 days.   - will need to be on prophylaxis following completing treatment course  - Day 1 albumin bolus of 1.5 gm/kg, D3 albumin 1 gm/kg to protect kidney function, d 3 today.   - Will need to restart lasix and spironolactone later if labs stable.      5. Alcohol cirrhosis  6. Portal vein thrombosis  - MELD 18. Has not been evaluated for liver  transplant in the past due to low MELD.   - Was advised as outpatient no alcohol use. Last had O'Douls approx 2 months ago.   - new PVT seen on imaging of CT and US. If TIPS an option in future, may also do thrombectomy. No anticoagulation  - No evidence of HCC on imaging.     Has follow up apt with Dr. Bales on 11/8    Patient was discussed with attending physician, Dr. Uriostegui      Thank you for the opportunity to be involved with  [facility]. Please call with any questions or concerns.    Josseline Naylor, APRN, CNP  [phone]

## 2017-11-01 NOTE — PROGRESS NOTES
[facility] Progress Note      Patient: Ivan Bell  Date of Admission: 10/30/2017  Hospital Day: # 2    Date of Service: 11/1/2017    Assessment & Plan:  Ivan Bell is a 52 year old male admitted on 10/30/2017. He has a history of alcoholic cirrhosis c/b ascites and esophageal varices admitted for BRBPR in setting of recent therapeutic paracentesis also with concern for SBP given fever upon admission.     # upper GI bleed  # acute blood loss anemia  # grade III esophageal varices s/p banding (10/31)   2 episodes of BRBPR.  Baseline Hgb ~12. Hgb drop 8.8 to 6.5 in ED. EGD 3/2016 with grade I esophageal varices. S/p 3 units of pRBC's.  Hgb stabilized.  EGD completed by GI on 10/31 and notable for portal hypertensive gastropathy and grade III esophageal varices that were banded x 5.  Varices with whale sign.  Flex sig also completed and with rectal varix without thomas risk stigmata.    - GI consulted, appreciate recs  - continue protonix gtt  - continue octreotide gtt  - vitamin K 10 mg IV x 3 doses  - monitor hgb Q12H   - treatment of SBP below  - holding PTA aldactone, plan to resume tomorrow if stable    # SBP  Diagnostic paracentesis upon admission with 7800 WBCs and 74% neutrophils.   - follow culture, currently NTD  - continue ceftriaxone 2g daily  - albumin 1.5g/kg IV day 1, repeat 1g/kg scheduled for 11/01    # streptococcus bacteremia   2/2 blood cultures positive on 10/30. Awaiting speciation and sensitivities.  Repeat cultures from 10/30 and 10/31 are negative.    - continue vancomycin  - follow speciation and sensitivities     # portal venous thrombus   CT abd with thrombosis of left portal vein and anterior branch of right portal vein. Nonocclusive thrombus noted in main portal vein. Also, with evidence of new infarct involving anterosuperior spleen. This is likely chronic and at least not acutely related to ascites as ascites has been chronic.  - GI consulted, appreciate recs  - avoiding  anticoagulation in setting of GI bleed     # EtOH cirrhosis c/b HE  Diagnosed many years ago after liver bx at [facility] which showed TOMAS cirrhosis, subsequently followed up with [facility] GI for short time. Patient also with long history of alcohol use, although reports no alcohol use currently.  No signs of HE at this time.   - GI following, appreciate recs  - holding PTA lactulose given bleeding  - holding PTA aldactone, plan to resume tomorrow if stable  - trend MELD labs  MELD-Na score: 18 at 11/1/2017  6:18 AM  MELD score: 17 at 11/1/2017  6:18 AM  Calculated from:  Serum Creatinine: 0.97 mg/dL (Rounded to 1) at 11/1/2017  6:18 AM  Serum Sodium: 136 mmol/L at 11/1/2017  6:18 AM  Total Bilirubin: 2.4 mg/dL at 11/1/2017  6:18 AM  INR(ratio): 1.89 at 11/1/2017  6:18 AM  Age: 52 years    # left lower extremity pain and rash  Tenderness and mild erythema on medial right thigh.  CK level 53.  CT of the leg without acute findings.   Now improving.    - continue to monitor    FEN: clear liquid w/ banding  GI PPx: protonix gtt  DVT PPx: no chemical given variceal bleed  CODE: full code  Disposition: pending stability of hemoglobin and off drips    Patient was seen and discussed with Dr. May.     Precious Luke MD, MS  Internal Medicine, PGY-2  Pager: [phone]    ___________________________________________________________________    Subjective:  EGD completed yesterday with banding.  No more episodes of melena.  Denies abdominal pain and nausea.  Hungry and would like to advance diet.  Pain on left leg is improved.  Nursing notes reviewed.  Afebrile.      Medications:  Current Facility-Administered Medications   Medication     potassium phosphate 15 mmol in D5W 250 mL intermittent infusion     potassium phosphate 20 mmol in D5W 500 mL intermittent infusion     potassium phosphate 20 mmol in D5W 250 mL intermittent infusion     potassium phosphate 25 mmol in D5W 500 mL intermittent infusion     HYDROmorphone (PF)  "(DILAUDID) injection 0.3-0.5 mg     vancomycin (VANCOCIN) 1,500 mg in NaCl 0.9 % 250 mL intermittent infusion     naloxone (NARCAN) injection 0.1-0.4 mg     lidocaine 1 % 1 mL     lidocaine (LMX4) kit     sodium chloride (PF) 0.9% PF flush 3 mL     sodium chloride (PF) 0.9% PF flush 3 mL     ondansetron (ZOFRAN-ODT) ODT tab 4 mg    Or     ondansetron (ZOFRAN) injection 4 mg     octreotide (sandoSTATIN) 1,250 mcg in NaCl 0.9 % 250 mL     pantoprazole (PROTONIX) 80 mg in NaCl 0.9 % 100 mL infusion     cefTRIAXone (ROCEPHIN) 2 g vial to attach to  ml bag for ADULTS or NS 50 ml bag for PEDS     phytonadione (AQUA-MEPHYTON) 10 mg in NaCl 0.9 % 50 mL intermittent infusion       Vitals:  Blood pressure 106/66, pulse 91, temperature 98.5  F (36.9  C), temperature source Oral, resp. rate 20, height 1.778 m (5' 10\"), weight 68.7 kg (151 lb 8 oz), SpO2 97 %.    Physical Exam:  General  - sitting up in bed, no acute distress  HEENT - NC/AT, no scleral icterus, mmm  Cardiovascular - RRR, normal S1/S2, no murmur appreciated  Pulmonary - CTAB, no wheezing or crackles appreciated, normal respiratory effort on room air  Abdomen - soft, non-tender, mild distension   Extremities - warm and well perfused, no peripheral edema in bilateral lower extremities  Neurologic - alert and oriented, no gross focal deficits, no asterixis  Psych - normal mood and affect  Skin - erythematous area on medial side of left leg which has receded from outline    Labs:  Reviewed.     Microbiology:   Reviewed.     Imaging:   Reviewed.    "

## 2017-11-01 NOTE — PLAN OF CARE
Problem: Patient Care Overview  Goal: Plan of Care/Patient Progress Review  Outcome: No Change  Neuro: A&Ox4. Appropriate. Unsteady and shaky on feet. Very thirsty. Asking if he can go hunting this weekend.  Cardiac: SR. VSS.       Respiratory: Sating mid 90's on RA.  GI/: Adequate urine output per urinal. No bm overnight.  Diet/appetite: Tolerating clears diet. Eating well. Had soup broth, 3 popsicle and 1L water. Hungry  Activity:  Assist of 1,up to bathroom  Pain: discomfort at left inner thigh area.   Skin: Intact, reddened inner left thigh, warm to touch. Suture in stomach old para site and bandage on right abdominal area.   LDA's:none     Plan: Continue with POC. Notify primary team with changes.

## 2017-11-01 NOTE — PLAN OF CARE
"Problem: Patient Care Overview  Goal: Plan of Care/Patient Progress Review  Outcome: No Change  Blood pressure 106/67, pulse 91, temperature 98.3  F (36.8  C), temperature source Oral, resp. rate 20, height 1.778 m (5' 10\"), weight 68.7 kg (151 lb 8 oz), SpO2 99 %.    Pt A&O x 4, denies any pain.  VSS, on RA.  Protonix and octreotide gtt infusing per orders.  Advanced to full liquids, tolerating well.  Wants to eat more.  Up with SBA to BR.  Adequate UOP, BM x 1.  Wife at bedside.  Phos replaced per protocol. Hgb recheck in for 1800. mepilex placed on coccyx wound by WOCN.  Call light within reach. Continue with plan of care.       "

## 2017-11-01 NOTE — PLAN OF CARE
Problem: Patient Care Overview  Goal: Plan of Care/Patient Progress Review  Outcome: No Change  End Of Shift Progress Note:     Neuro: alert and oriented, occasionally forgetful, impulsive and restless since returning for upper GI endoscopy, easily redirectable  Cardiac: remains on tele, sinus rhythm; BP soft occasionally, IVF continue to run  Respiratory: sating well on RA, needed O2 following procedure but no longer in need; lung sounds clear-diminished with fine crackles in upper lobes  GI/: voiding well, no concerns; bowel sounds hyperactive  Diet/appetite: tolerating NPO diet  Activity: SBA-A1 with transfers  Pain: reported pain in L leg/back, refused pain medications  Skin: rash on L inner thigh, continue to monitor; coccyx wound, no concerns  LDA's: PIV x2, both infusing, no concerns     Plan: Continue with plan of care. Notify primary team with changes.        Carin Zuniga RN  10/31/17  11:00 PM

## 2017-11-01 NOTE — PROGRESS NOTES
Wife called for update, RN in another room for awhile, RN returned call x twice with no answer and no voicemail.

## 2017-11-02 ENCOUNTER — HOME INFUSION (PRE-WILLOW HOME INFUSION) (OUTPATIENT)
Dept: PHARMACY | Facility: CLINIC | Age: 52
End: 2017-11-02

## 2017-11-02 LAB
ALBUMIN SERPL-MCNC: 2.8 G/DL (ref 3.4–5)
ALP SERPL-CCNC: 101 U/L (ref 40–150)
ALT SERPL W P-5'-P-CCNC: 13 U/L (ref 0–70)
ANION GAP SERPL CALCULATED.3IONS-SCNC: 8 MMOL/L (ref 3–14)
AST SERPL W P-5'-P-CCNC: 24 U/L (ref 0–45)
BACTERIA SPEC CULT: ABNORMAL
BACTERIA SPEC CULT: ABNORMAL
BILIRUB SERPL-MCNC: 2.3 MG/DL (ref 0.2–1.3)
BUN SERPL-MCNC: 13 MG/DL (ref 7–30)
CA-I BLD-MCNC: 4.3 MG/DL (ref 4.4–5.2)
CALCIUM SERPL-MCNC: 7.5 MG/DL (ref 8.5–10.1)
CHLORIDE BLD-SCNC: 110 MMOL/L (ref 94–109)
CHLORIDE SERPL-SCNC: 112 MMOL/L (ref 94–109)
CO2 SERPL-SCNC: 19 MMOL/L (ref 20–32)
CREAT SERPL-MCNC: 0.81 MG/DL (ref 0.66–1.25)
ERYTHROCYTE [DISTWIDTH] IN BLOOD BY AUTOMATED COUNT: 17.6 % (ref 10–15)
GFR SERPL CREATININE-BSD FRML MDRD: >90 ML/MIN/1.7M2
GLUCOSE BLD-MCNC: 158 MG/DL (ref 70–99)
GLUCOSE SERPL-MCNC: 107 MG/DL (ref 70–99)
HCT VFR BLD AUTO: 24.6 % (ref 40–53)
HGB BLD-MCNC: 7.8 G/DL (ref 13.3–17.7)
INR PPP: 1.83 (ref 0.86–1.14)
LACTATE BLD-SCNC: 1.6 MMOL/L (ref 0.7–2)
LACTATE BLD-SCNC: 1.6 MMOL/L (ref 0.7–2)
Lab: ABNORMAL
MCH RBC QN AUTO: 27.6 PG (ref 26.5–33)
MCHC RBC AUTO-ENTMCNC: 31.7 G/DL (ref 31.5–36.5)
MCV RBC AUTO: 87 FL (ref 78–100)
PHOSPHATE SERPL-MCNC: 2.3 MG/DL (ref 2.5–4.5)
PLATELET # BLD AUTO: 71 10E9/L (ref 150–450)
POTASSIUM BLD-SCNC: 3.8 MMOL/L (ref 3.4–5.3)
POTASSIUM SERPL-SCNC: 3.9 MMOL/L (ref 3.4–5.3)
PROT SERPL-MCNC: 5.4 G/DL (ref 6.8–8.8)
RBC # BLD AUTO: 2.83 10E12/L (ref 4.4–5.9)
SODIUM BLD-SCNC: 134 MMOL/L (ref 133–144)
SODIUM SERPL-SCNC: 138 MMOL/L (ref 133–144)
SPECIMEN SOURCE: ABNORMAL
VANCOMYCIN SERPL-MCNC: 15 MG/L
WBC # BLD AUTO: 3.7 10E9/L (ref 4–11)

## 2017-11-02 PROCEDURE — 25000125 ZZHC RX 250: Performed by: INTERNAL MEDICINE

## 2017-11-02 PROCEDURE — 99233 SBSQ HOSP IP/OBS HIGH 50: CPT | Mod: GC | Performed by: INTERNAL MEDICINE

## 2017-11-02 PROCEDURE — 84100 ASSAY OF PHOSPHORUS: CPT | Performed by: HOSPITALIST

## 2017-11-02 PROCEDURE — 85610 PROTHROMBIN TIME: CPT | Performed by: HOSPITALIST

## 2017-11-02 PROCEDURE — 82330 ASSAY OF CALCIUM: CPT

## 2017-11-02 PROCEDURE — S0164 INJECTION PANTROPRAZOLE: HCPCS | Performed by: INTERNAL MEDICINE

## 2017-11-02 PROCEDURE — 85027 COMPLETE CBC AUTOMATED: CPT | Performed by: HOSPITALIST

## 2017-11-02 PROCEDURE — 36415 COLL VENOUS BLD VENIPUNCTURE: CPT | Performed by: HOSPITALIST

## 2017-11-02 PROCEDURE — 80202 ASSAY OF VANCOMYCIN: CPT | Performed by: INTERNAL MEDICINE

## 2017-11-02 PROCEDURE — 12000001 ZZH R&B MED SURG/OB UMMC

## 2017-11-02 PROCEDURE — 82435 ASSAY OF BLOOD CHLORIDE: CPT

## 2017-11-02 PROCEDURE — 83605 ASSAY OF LACTIC ACID: CPT

## 2017-11-02 PROCEDURE — 25000128 H RX IP 250 OP 636: Performed by: HOSPITALIST

## 2017-11-02 PROCEDURE — 90686 IIV4 VACC NO PRSV 0.5 ML IM: CPT | Performed by: INTERNAL MEDICINE

## 2017-11-02 PROCEDURE — 25000125 ZZHC RX 250: Performed by: HOSPITALIST

## 2017-11-02 PROCEDURE — 84132 ASSAY OF SERUM POTASSIUM: CPT

## 2017-11-02 PROCEDURE — 80053 COMPREHEN METABOLIC PANEL: CPT | Performed by: HOSPITALIST

## 2017-11-02 PROCEDURE — 25000131 ZZH RX MED GY IP 250 OP 636 PS 637: Performed by: INTERNAL MEDICINE

## 2017-11-02 PROCEDURE — 36415 COLL VENOUS BLD VENIPUNCTURE: CPT | Performed by: INTERNAL MEDICINE

## 2017-11-02 PROCEDURE — 25000128 H RX IP 250 OP 636: Performed by: INTERNAL MEDICINE

## 2017-11-02 PROCEDURE — 36569 INSJ PICC 5 YR+ W/O IMAGING: CPT

## 2017-11-02 PROCEDURE — 82947 ASSAY GLUCOSE BLOOD QUANT: CPT

## 2017-11-02 PROCEDURE — 27210208 ZZH KIT VALVED DOUBLE LUMEN

## 2017-11-02 PROCEDURE — 84295 ASSAY OF SERUM SODIUM: CPT

## 2017-11-02 PROCEDURE — 83605 ASSAY OF LACTIC ACID: CPT | Performed by: INTERNAL MEDICINE

## 2017-11-02 RX ORDER — HEPARIN SODIUM,PORCINE 10 UNIT/ML
5-10 VIAL (ML) INTRAVENOUS EVERY 24 HOURS
Status: DISCONTINUED | OUTPATIENT
Start: 2017-11-02 | End: 2017-11-03 | Stop reason: HOSPADM

## 2017-11-02 RX ORDER — HEPARIN SODIUM,PORCINE 10 UNIT/ML
5-10 VIAL (ML) INTRAVENOUS
Status: DISCONTINUED | OUTPATIENT
Start: 2017-11-02 | End: 2017-11-03 | Stop reason: HOSPADM

## 2017-11-02 RX ORDER — PANTOPRAZOLE SODIUM 40 MG/1
40 TABLET, DELAYED RELEASE ORAL
Status: DISCONTINUED | OUTPATIENT
Start: 2017-11-03 | End: 2017-11-03 | Stop reason: HOSPADM

## 2017-11-02 RX ORDER — LIDOCAINE 40 MG/G
CREAM TOPICAL
Status: DISCONTINUED | OUTPATIENT
Start: 2017-11-02 | End: 2017-11-03 | Stop reason: HOSPADM

## 2017-11-02 RX ADMIN — POTASSIUM PHOSPHATE, MONOBASIC AND POTASSIUM PHOSPHATE, DIBASIC 15 MMOL: 224; 236 INJECTION, SOLUTION INTRAVENOUS at 10:35

## 2017-11-02 RX ADMIN — PANTOPRAZOLE SODIUM 8 MG/HR: 40 INJECTION, POWDER, FOR SOLUTION INTRAVENOUS at 08:30

## 2017-11-02 RX ADMIN — LIDOCAINE HYDROCHLORIDE 3 ML: 10 INJECTION, SOLUTION EPIDURAL; INFILTRATION; INTRACAUDAL; PERINEURAL at 16:10

## 2017-11-02 RX ADMIN — CEFTRIAXONE SODIUM 2 G: 2 INJECTION, POWDER, FOR SOLUTION INTRAMUSCULAR; INTRAVENOUS at 14:22

## 2017-11-02 RX ADMIN — OCTREOTIDE ACETATE 50 MCG/HR: 200 INJECTION, SOLUTION INTRAVENOUS; SUBCUTANEOUS at 00:20

## 2017-11-02 RX ADMIN — VANCOMYCIN HYDROCHLORIDE 1500 MG: 10 INJECTION, POWDER, LYOPHILIZED, FOR SOLUTION INTRAVENOUS at 07:31

## 2017-11-02 RX ADMIN — INFLUENZA A VIRUS A/MICHIGAN/45/2015 X-275 (H1N1) ANTIGEN (FORMALDEHYDE INACTIVATED), INFLUENZA A VIRUS A/HONG KONG/4801/2014 X-263B (H3N2) ANTIGEN (FORMALDEHYDE INACTIVATED), INFLUENZA B VIRUS B/PHUKET/3073/2013 ANTIGEN (FORMALDEHYDE INACTIVATED), AND INFLUENZA B VIRUS B/BRISBANE/60/2008 ANTIGEN (FORMALDEHYDE INACTIVATED) 0.5 ML: 15; 15; 15; 15 INJECTION, SUSPENSION INTRAMUSCULAR at 10:35

## 2017-11-02 ASSESSMENT — ACTIVITIES OF DAILY LIVING (ADL)
ADLS_ACUITY_SCORE: 13
ADLS_ACUITY_SCORE: 13
ADLS_ACUITY_SCORE: 12
ADLS_ACUITY_SCORE: 12

## 2017-11-02 NOTE — PROGRESS NOTES
Home Infusion  Ivan is expected to dc tomorrow and will be going home on daily IV rocephin.   He has never done home IV therapy before and has not yet been to the [facility] for initial teaching.  Met with Ivan and his wife, Elicia at bedside and provided information about [facility] services.  Explained about administration method, showed them the teaching materials and explained that a home nurse will provide additional teaching needed for self-administration.  Informed them about supplies and delivery of supplies,  dosing schedule, plan for SNV and 24/7 availability of I staff while on IV therapy.     Ivan and Elicia verbalized understanding of all information given.   They were a little taken aback at first being told about self-administration but are more comfortable now with a little more info.  They are willing and able to learn and manage home IV therapy.  Questions answered.  Will continue to follow until dc and update pt once final orders are determined.    Kimberly JARVIS  [place] [facility] Liaison  [phone] pager

## 2017-11-02 NOTE — PLAN OF CARE
Problem: Patient Care Overview  Goal: Plan of Care/Patient Progress Review  Outcome: No Change  Temp:  [98.3  F (36.8  C)-98.7  F (37.1  C)] 98.5  F (36.9  C)  Pulse:  [91] 91  Heart Rate:  [81-90] 89  Resp:  [19-20] 19  BP: ()/(48-69) 102/57  SpO2:  [95 %-99 %] 95 %  Shift 2116-6205  Neuro: A/Ox4.  Cardiac:NSR,  Respiratory:  RA.  GI/: 1bm, flushed before visualized, pt denies any blood with stool. Adequate UO.   Diet/appetite: full liquid, good appetite.   Activity: SBA to bathroom.   Pain: headache, 1x dose tylenol given.    Skin: rash to L thigh outlined, improving. Coccyx mepilex CDI.       hgb 7.6 notified cross cover of drop from 8.0 this morning. No new orders at this time, will continue to monitor for bleeding.  1.6 phos. Ordered replacement infusion, awaiting delivery from pharmacy.      Continue with POC. Notify primary team with changes.

## 2017-11-02 NOTE — PLAN OF CARE
"Problem: Patient Care Overview  Goal: Plan of Care/Patient Progress Review  Outcome: No Change  Blood pressure 115/67, pulse 91, temperature 97.6  F (36.4  C), temperature source Oral, resp. rate 22, height 1.778 m (5' 10\"), weight 70 kg (154 lb 5.2 oz), SpO2 100 %.    Pt VSS, on RA.  Denies any pain.  A&O x 4.  Up with SBA to BR.  Advanced to mechanical soft diet this morning, tolerating well.  Good UOP.  BM x 1, no blood noted.  Phosphorus replaced per protocol, currently infusing.  Suture removed from LLQ abdomen from paracentesis site.  Large amount of serous drainage since, soaking through pressure dressings.  MD placed dermabond on site.  Report given to 5A.  All belongings packed and sent with pt in wheelchair.  Transferred around 1145.      "

## 2017-11-02 NOTE — PROGRESS NOTES
[facility] Progress Note      Patient: Ivan Bell  Date of Admission: 10/30/2017  Hospital Day: # 3    Date of Service: 11/2/2017    Assessment & Plan:  Ivan Bell is a 52 year old male admitted on 10/30/2017. He has a history of alcoholic cirrhosis c/b ascites and esophageal varices admitted for BRBPR in setting of recent therapeutic paracentesis also with concern for SBP given fever upon admission.     # upper GI bleed  # acute blood loss anemia  # grade III esophageal varices s/p banding (10/31)   2 episodes of BRBPR.  Baseline Hgb ~12. Hgb drop 8.8 to 6.5 in ED. EGD 3/2016 with grade I esophageal varices. S/p 3 units of pRBC's.  Hgb stabilized.  EGD completed by GI on 10/31 and notable for portal hypertensive gastropathy and grade III esophageal varices that were banded x 5.  Flex sig also completed and with rectal varix without thomas risk stigmata.    - GI consulted, appreciate recs  - continue protonix gtt x 72 hours (end 11/2 @ 2000)  - protonix 40 mg BID (start 11/3)  - continue octreotide gtt x 72 hours (end 11/2 @ 2000)  - completed vitamin K 10 mg IV x 3 doses  - treatment of SBP below  - resume aldactone at d/c   - repeat EGD in 1 month    # SBP  Diagnostic paracentesis upon admission with 7800 WBCs and 74% neutrophils.   - follow culture, currently NTD  - continue ceftriaxone 2g daily  - albumin 1.5g/kg IV day 1, 1g/kg IV day 3  - prophylaxis following completion of ceftriaxone     # beta hemolytic group C strep bacteremia   2/2 blood cultures positive on 10/30 for beta hemolytic group c strep (pan-sensitive).    - d/c vancomycin   - ceftriaxone 2 g daily as above (14 day course)     # portal venous thrombus   CT abd with thrombosis of left portal vein and anterior branch of right portal vein. Nonocclusive thrombus noted in main portal vein. Also, with evidence of new infarct involving anterosuperior spleen. This is likely chronic and at least not acutely related to ascites as ascites has been  chronic.  - GI consulted, appreciate recs  - avoiding anticoagulation in setting of GI bleed     # EtOH cirrhosis c/b HE  Diagnosed many years ago after liver bx at [facility] which showed TOMAS cirrhosis, subsequently followed up with [facility] for short time. Patient also with long history of alcohol use, although reports no alcohol use currently.  No signs of HE at this time.   - GI following, appreciate recs  - resume aldactone and lactulose at d/c  - trend MELD labs  MELD-Na score: 18 at 11/2/2017  8:45 AM  MELD score: 16 at 11/2/2017  6:35 AM  Calculated from:  Serum Creatinine: 0.81 mg/dL (Rounded to 1) at 11/2/2017  6:35 AM  Serum Sodium: 134 mmol/L at 11/2/2017  8:45 AM  Total Bilirubin: 2.3 mg/dL at 11/2/2017  6:35 AM  INR(ratio): 1.83 at 11/2/2017  6:35 AM  Age: 52 years    # left lower extremity pain and rash  Tenderness and mild erythema on medial right thigh.  CK level 53.  CT of the leg without acute findings.   Now improving.    - continue to monitor    FEN: mechanical soft  GI PPx: protonix gtt  DVT PPx: no chemical given variceal bleed  CODE: full code  Disposition: tomorrow once transitioned of gtt    Patient was seen and discussed with Dr. May.     Precious Luke MD, MS  Internal Medicine, PGY-2  Pager: [phone]    ___________________________________________________________________    Subjective:  No more episodes of melena.  Denies abdominal pain and nausea.  Pain on left leg is improved.  Nursing notes reviewed.  Afebrile. Hemoglobin stable.   4 point ROS otherwise negative.      Medications:  Current Facility-Administered Medications   Medication     lidocaine 1 % 0.5-5 mL     lidocaine (LMX4) kit     sodium chloride (PF) 0.9% PF flush 10-20 mL     sodium chloride (PF) 0.9% PF flush 10 mL     potassium phosphate 15 mmol in D5W 250 mL intermittent infusion     potassium phosphate 20 mmol in D5W 500 mL intermittent infusion     potassium phosphate 20 mmol in D5W 250 mL intermittent infusion      "potassium phosphate 25 mmol in D5W 500 mL intermittent infusion     HYDROmorphone (PF) (DILAUDID) injection 0.3-0.5 mg     naloxone (NARCAN) injection 0.1-0.4 mg     lidocaine 1 % 1 mL     lidocaine (LMX4) kit     sodium chloride (PF) 0.9% PF flush 3 mL     ondansetron (ZOFRAN-ODT) ODT tab 4 mg    Or     ondansetron (ZOFRAN) injection 4 mg     octreotide (sandoSTATIN) 1,250 mcg in NaCl 0.9 % 250 mL     pantoprazole (PROTONIX) 80 mg in NaCl 0.9 % 100 mL infusion     cefTRIAXone (ROCEPHIN) 2 g vial to attach to  ml bag for ADULTS or NS 50 ml bag for PEDS       Vitals:  Blood pressure 115/67, pulse 91, temperature 97.6  F (36.4  C), temperature source Oral, resp. rate 22, height 1.778 m (5' 10\"), weight 70 kg (154 lb 5.2 oz), SpO2 100 %.    Physical Exam:  General  - sitting up in bed, no acute distress  HEENT - NC/AT, no scleral icterus, mmm  Cardiovascular - RRR, normal S1/S2, no murmur appreciated  Pulmonary - CTAB, no wheezing or crackles appreciated, normal respiratory effort on room air  Abdomen - soft, non-tender, mild distension   Extremities - warm and well perfused, no peripheral edema in bilateral lower extremities  Neurologic - alert and oriented, no gross focal deficits, no asterixis  Psych - normal mood and affect  Skin - erythematous area on medial side of left leg which has receded from outline    Labs:  Reviewed.     Microbiology:   Reviewed.     Imaging:   No new imaging.    "

## 2017-11-02 NOTE — PLAN OF CARE
"Problem: Patient Care Overview  Goal: Plan of Care/Patient Progress Review  /65 (BP Location: Right arm)  Pulse 76  Temp 97.8  F (36.6  C) (Oral)  Resp 18  Ht 1.778 m (5' 10\")  Wt 70 kg (154 lb 5.2 oz)  SpO2 99%  BMI 22.14 kg/m2     Admitted for GI bleed. Patient A&Ox4, able to make needs known. Up w/SBA. Denies pain. Paracentesis site now closed with dermabond, no leaking noted. Plan for midline placement, patient will d/c with midline for IV antibiotics. Patient learning center appointment scheduled for tomorrow at 1330, wife will attend. Will continue to monitor.              "

## 2017-11-02 NOTE — PROGRESS NOTES
Care Coordinator Progress Note     Admission Date/Time:  10/30/2017  Attending MD:  Eric May MD     Data  Chart reviewed, discussed with interdisciplinary team.   Patient was admitted for:    Gastrointestinal hemorrhage, unspecified gastrointestinal hemorrhage type  Alcoholic cirrhosis of liver with ascites (H)  Secondary esophageal varices without bleeding (H).    Concerns with insurance coverage for discharge needs: None.  Current Living Situation: Patient lives with spouse.  Support System: Supportive and Involved  Services Involved: none  Transportation: Family or Friend will provide  Barriers to Discharge: pending medical clearance    Coordination of Care and Referrals: Provided patient/family with options for Home Infusion.        Assessment  Was notified patient will need IV ceftriaxone daily for 2 weeks.  Spoke with patient at bedside.  He would like to use [facility] and gave permission for checking benefits.  Referral and orders placed.    Addendum: Patient has met his $5000 deductible and now has 100% coverage for home IV antibiotics.  Patient notified. PLC order placed.    [facility]  Phone  [phone]  Fax  [phone]       Plan  Anticipated Discharge Date:  tomorrow  Anticipated Discharge Plan:  Home with IV antibiotics    Jolanta Gomes RN, BSN  Care Coordinator [facility]  Pager: [phone]  Phone: [phone]

## 2017-11-02 NOTE — PROGRESS NOTES
[facility], [place]   Antimicrobial Management Team (AMT) Note              To: Constance Davis  Unit: 0E, 3389  Allergies   Allergen Reactions     Benadryl [Diphenhydramine] Other (See Comments)     Delirium (visual and auditory hallucinations)       Brief Summary: Ivan Bell is a 52 year old male with PMH of alcoholic cirrhosis, hepatitis, hx of portal hypertensive gastropathy hypertension, and an endoscopy (3/2016) that showed small esophageal varices admitted on 10/30/17 for GI bleed and decompensated hepatic cirrhosis.    HPI: Patient presented to [facility] ED with concerns of left leg pain (behind the knee), red bloody diarrhea (started in AM), and had previously been in the ED earlier that morning for paracentesis which collected approximately 9 L. Deep vein thrombosis was ruled out. The puncture site for paracentesis continued to leak per patient and left abdomen remain distended with some localized tenderness. He reported a 2 to 3 week history of cough/congestion and was febrile (101.2F) on examination with elevated lactate (3.3), heart rate (102), and hypotensive (83/56). Empirically started on ceftriaxone (10/30) for intra-abdominal infection/SBP and vancomycin (10/31) for bacteremia. Vancomycin was discontinued on 11/2 after susceptibility results showed favorable sensitivities to more narrow spectrum antibiotics (ceftriaxone ML <0.25, clindamycin ML <0.06). 2/4 BCx collected peripherally on 10/30 were positive for beta-hemolytic Streptococcus group C. 2/2 BCx collected peripherally on 10/31 were negative for growth. Ascites fluid was collected with negative results for gram stain and aerobic bacteria. Patient is on Day 4 of ceftriaxone.      Assessment:   SBP vs Bacteremia. Patient is afebrile, slightly low WBC count (3.7), and mostly hemodynamically stable other than elevated respiratory rate (22) on 11/1. He has significant history of ascites and concerns of SBP with diagnostic  paracentesis showing 7800 WBCs and 74% neutrophils. Repeat blood cultures have been negative for growth. Agree with discontinuation of vancomycin and continuation of ceftriaxone as it provides coverage of both SBP and Streptococcus group C (ML <0.25). Although AASLD guidelines recommend a duration of therapy for 7 days for SBP (Class 1, Level A - American Association for the Study of Liver Diseases), management of bacteremia is more important and IDSA guidelines state that a 14-day course of antibiotics from last negative blood culture (Stop Date: 11/12) is appropriate.    Recommendations:  1. Agree with discontinuing vancomycin  2. Agree with current regimen of ceftriaxone for a 14-day course (Stop Date: 11/2)    Discussed with ID Staff - Dr. Dylan Berumen MD and Dr. Nina Bacon , PharmD   Ha    BETA HEMOLYTIC STREPTOCOCCUS GROUP C      Antibiotic Interpretation Sensitivity Unit Method Status     AMPICILLIN Sensitive <=0.06 ug/mL ML Preliminary     CEFOTAXIME Sensitive <=0.25 ug/mL ML Preliminary     CEFTRIAXONE Sensitive <=0.25 ug/mL ML Preliminary     CLINDAMYCIN Sensitive <=0.06 ug/mL ML Preliminary     MEROPENEM Sensitive <=0.06 ug/mL ML Preliminary     PENICILLIN Sensitive <=0.03 ug/mL ML Preliminary     VANCOMYCIN Sensitive 0.25 ug/mL ML Preliminary       Current Anti-infective Orders:  Anti-infectives (Future)    Start     Dose/Rate Route Frequency Ordered Stop    10/31/17 1400  cefTRIAXone (ROCEPHIN) 2 g vial to attach to  ml bag for ADULTS or NS 50 ml bag for PEDS      2 g  over 30 Minutes Intravenous EVERY 24 HOURS 10/30/17 2050      10/31/17 0515  vancomycin (VANCOCIN) 1,500 mg in NaCl 0.9 % 250 mL intermittent infusion      1,500 mg  over 90 Minutes Intravenous EVERY 12 HOURS 10/31/17 0503            Vitals and other clinical features  Vitals       10/31 0700  -  11/01 0659 11/01 0700  -  11/02 0659 11/02 0700  -  11/02 0838   Most Recent    Temp ( F) 97.8 -  98.7    98 -  98.7       98.4     98.4 (36.9)    Pulse   91         91    Heart Rate 85 -  105    74 -  89      79     79    Resp 14 -  23    14 -  20      22     22    BP (!)85/47 -  116/69    98/53 -  123/67      112/67     112/67    SpO2 (%) (!)28 -  99    95 -  99      100     100        Temperature curve:      Labs  Estimated Creatinine Clearance: 103.2 mL/min (based on Cr of 0.81).  Recent Labs   Lab Test  11/02/17   0635  11/01/17   0618  10/31/17   0559  10/30/17   2006  10/30/17   1345  10/30/17   0035   CR  0.81  0.97  1.18  1.15  1.11  1.24       Recent Labs   Lab Test  11/02/17   0635  11/01/17   1805  11/01/17 0618  10/31/17   2024  10/31/17   1147  10/31/17   0559   10/30/17   1345  10/30/17   0035  10/11/17   0829   03/08/16   1727  03/07/16   0833   02/28/16   0248  02/25/16   1630   WBC  3.7*   --   4.7   --    --   6.0   --   8.5  11.3*  5.4   < >  14.6*  14.7*   < >  8.7  8.3   ANEU   --    --    --    --    --    --    --   7.4  8.6*  3.5   --   11.5*  11.7*   --   6.6  6.6   ALYM   --    --    --    --    --    --    --   0.6*  1.4  0.8   --   1.3  1.1   --   0.8  0.6*   COREY   --    --    --    --    --    --    --   0.4  1.3  0.6   --   1.5*  1.6*   --   1.1  0.9   AEOS   --    --    --    --    --    --    --    --   0.0  0.4   --   0.2  0.3   --   0.2  0.1   HGB  7.8*  7.6*  8.0*  8.1*  8.9*  8.0*   < >  6.5*  8.8*  9.3*   < >  12.7*  12.8*   < >  14.0  14.2   HCT  24.6*   --   25.0*   --    --   25.0*   --   20.9*  27.2*  29.3*   < >  36.4*  36.0*   < >  38.9*  40.1   MCV  87   --   86   --    --   86   --   85  83  95   < >  103*  103*   < >  98  98   PLT  71*   --   80*   --    --   70*   --   120*  159  130*   < >  196  210   < >  133*  99*    < > = values in this interval not displayed.       Recent Labs   Lab Test  11/02/17   0635  11/01/17   0618  10/31/17   0559  10/30/17   1345  10/30/17   0035  10/11/17   0829   BILITOTAL  2.3*  2.4*  3.5*  2.3*  2.7*  2.9*   ALKPHOS  101  113  106  163*  221*  330*    ALBUMIN  2.8*  2.7*  3.4  2.7*  2.3*  2.8*   AST  24  26  24  29  38  46*   ALT  13  11  14  17  19  21       Recent Labs   Lab Test  10/31/17   0312  10/30/17   2006   LACT  1.4  2.6*     Recent Labs   Lab Test  11/02/17   0635   VANCOMYCIN  15.0       Culture results with specimen source  Culture Micro   Date Value Ref Range Status   10/31/2017 No growth after 2 days  Preliminary   10/31/2017 No growth after 2 days  Preliminary   10/30/2017 No growth after 3 days  Preliminary   10/30/2017 No growth after 3 days  Preliminary   10/30/2017 Culture negative monitoring continues  Preliminary   10/30/2017 (A)  Preliminary    Cultured on the 1st day of incubation:  Beta hemolytic Streptococcus group C  Susceptibility testing in progress     10/30/2017   Preliminary    Critical Value/Significant Value, preliminary result only, called to and read back by  Daryl Sethi RN 0449 10/31/17. MS     10/30/2017   Preliminary    (Note)  POSITIVE for STREPTOCOCCUS SPECIES OTHER THAN pneumococcus, anginosus  group, S. pyogenes and S. agalactiae. Performed using Tagrule  multiplex nucleic acid test. Final identification and antimicrobial  susceptibility testing will be verified by standard methods.    Specimen tested with Verigene multiplex, gram-positive blood culture  nucleic acid test for the following targets: Staph aureus, Staph  epidermidis, Staph lugdunensis, other Staph species, Enterococcus  faecalis, Enterococcus faecium, Streptococcus species, S. agalactiae,  S. anginosus grp., S. pneumoniae, S. pyogenes, Listeria sp., mecA  (methicillin resistance) and Chastity/B (vancomycin resistance).    Critical Value/Significant Value, preliminary result only, called to  and read back by CHHAYA WARREN RN 10/31/17 0758. ANUSHA      Specimen Description   Date Value Ref Range Status   10/31/2017 Blood Left Arm  Final   10/31/2017 Blood Right Arm  Final   10/30/2017 Blood Right Arm  Final   10/30/2017 Blood Left Arm  Final   10/30/2017 Ascites   Final   10/30/2017 Ascites  Final        Urine Studies     Recent Labs   Lab Test  03/09/16   0017  02/28/16   0425  02/25/16   1718  12/27/13   0918  12/18/12   1642   URINEPH  5.5  6.0  6.5  6.0  6.0   NITRITE  Negative  Negative  Negative  Negative  Negative   LEUKEST  Negative  Negative  Negative  Trace*  Negative   WBCU  <1  6*  1  O - 2  O - 2       CSF Testing  No lab results found.    Respiratory Virus Testing    No results found for: RVSPEC, IFLUA, FLUAH1, FLUAH3, YL0120, IFLUB, RSVA, RSVB, PIV1, PIV2, PIV3, HMPV, HRVS, ADVBE, ADVC  Last check of C difficile  C Diff Toxin B PCR   Date Value Ref Range Status   03/09/2016  NEG Final    Negative  Negative: Clostridium difficile target DNA sequences NOT detected, presumed   negative for Clostridium difficile toxin B or the number of bacteria present   may be below the limit of detection for the test.   FDA approved assay performed using [employer] GeneXpert real-time PCR.   A negative result does not exclude actual disease due to Clostridium difficile   and may be due to improper collection, handling and storage of the specimen or   the number of organisms in the specimen is below the detection limit of the   assay.         Imaging:  Ct Femur Left W/o Contrast    Result Date: 10/31/2017  Exam: CT FEMUR LEFT W/O CONTRAST, 10/31/2017 3:34 AM Indication: pain with erythema of left leg behind knee Comparison: None Technique: CT scan of the left lower extremity was performed on a multidetector scanner and images were reconstructed in multiple planes and reviewed. Findings: Visualized hip and knee joint synovial exhibit no evidence of dislocation. No bone shows no evidence of osseous lesion. The anterior, medial and posterior compartment of the thigh show normal architecture. No space-occupying mass lesion is seen. Mild subcutaneous streakiness is seen involving the medial and posterior thigh, believed to be related to CT contrast from yesterday. The presence of the  contrast soft tissue suggest underlying edema. No hemorrhage is demonstrated..     Impression: 1. No osseous lesion involving the femur. No intramuscular hematoma 2. Mild subcutaneous streakiness involving the medial and posterior thigh, believed to be related to contrast from yesterday's CT of the abdomen.. I have personally reviewed the examination and initial interpretation and I agree with the findings. KERRIE CRUZ MD    Us Lower Extremity Venous Duplex Left    Result Date: 10/30/2017  VENOUS ULTRASOUND LEFT LEG  10/30/2017 2:38 PM HISTORY: Evaluate for DVT versus superficial thrombophlebitis., History of alcoholic cirrhosis, status post paracentesis therapeutically.  Fever, leg pain COMPARISON: None. FINDINGS:  Examination of the deep veins with graded compression and color flow Doppler with spectral wave form analysis was performed.     IMPRESSION: No evidence of deep venous thrombosis.  Probable ascites in the right inguinal region. TRE MARTINEZ MD    Ct Abdomen Pelvis W Contrast    Result Date: 10/30/2017  CT ABDOMEN AND PELVIS WITH CONTRAST   10/30/2017 3:33 PM HISTORY: Left lower quadrant abdominal pain. Fever, bloody stools. Status post paracentesis. Evaluate for ischemic versus other acute process. TECHNIQUE:   CT of the abdomen and pelvis was performed following the administration of 77 mL Isovue 370. Radiation dose for this scan was reduced using automated exposure control, adjustment of the mA and/or kV according to patient size, or iterative reconstruction technique. COMPARISON: CT of the abdomen and pelvis dated 2/25/2016. FINDINGS: The colon appears grossly unremarkable. There is a small hiatal hernia. The liver is atrophic with nodular contour consistent with cirrhosis. There appears to be thrombosis of the left portal vein and anterior branch of the right portal vein. There is nonocclusive thrombus in the main portal vein to its junction with the superior mesenteric vein. These findings  are new. There are distal esophageal varices. The spleen is enlarged with a maximum length of approximately 16 cm with the upper limits of normal being 13 mm. There is a new wedge-shaped area of decreased perfusion in the anterosuperior portions of the spleen most consistent with an infarct. There are multiple nonobstructing stones in the kidneys bilaterally. There also appears to be a 5 mm stone in the mid distal left ureter. No significant left hydroureter is noted. The pancreas and adrenal glands appear unremarkable. There are multiple gallstones. There is a moderate amount of ascites in the abdomen and pelvis.     IMPRESSION: 1. Cirrhosis of the liver. Thrombosis of the left portal vein and anterior branch of the right portal vein. Nonocclusive thrombus is noted in the main portal vein as above. Distal esophageal varices. 2. Splenomegaly with evidence of a new infarct involving the anterosuperior spleen. 3. Moderate amount of ascites. 4. Nephrolithiasis bilaterally. There also appears to be a stone in the mid distal left ureter. No significant left hydroureter or hydronephrosis. 5. Gallstones. TRE MARTINEZ MD    Us Abddomen Limited W Abd/pelvis Duplex Complete    Result Date: 10/31/2017  EXAMINATION: US ABDOMEN COMPLETE WITH DOPPLER, 10/31/2017 8:37 AM COMPARISON: CT exam 10/30/2017. HISTORY: Cirrhosis. Evaluation for portal flow. TECHNIQUE: The abdomen was scanned in standard fashion with specialized ultrasound transducer(s) using both gray-scale, color Doppler, and spectral flow techniques. Findings: Liver: Cirrhotic configuration of the liver parenchyma with coarse heterogeneous echotexture, nodular contours and irregular surface. No focal liver lesions. No intra or extrahepatic biliary dilation. Recanalization of the paraumbilical vein. Extrahepatic portal vein flow is bidirectional, measuring 42 cm/sec. There is a partially nonocclusive intraluminal echogenic thrombus at the main portal vein at the  kayla hepatis. Right portal vein flow is retrograde, measuring 26 cm/sec. The anterior branch of the right portal vein is occluded with no evidence of flow. Left portal vein is occluded with intraluminal thrombus and no evidence of flow. Flow in the hepatic artery is towards the liver and: 149 cm/sec peak systolic 0.69 resistive index. The splenic vein is not characterized in the present study due to overlying bowel gas. The left, middle, and right hepatic veins are patent with flow towards the IVC. The IVC is patent with flow towards the heart. Gallbladder: There is no pericholecystic fluid, positive sonographic Zimmer's sign. Cholelithiasis. No associated sonographic findings of acute cholecystitis. Mild gallbladder wall thickening measuring 4 mm, likely due to hepatic intrinsic disease and ascites. Bile Ducts: Both the intra- and extrahepatic biliary system are of normal caliber.  The common bile duct measures 4 mm in diameter. Pancreas: Not evaluated due to overlying bowel gas. Right kidney with normal size, position, echogenicity, cortical thickness and corticomedullary junction differentiation. Scattered nonobstructive stones. No hydronephrosis. The right kidney measures 11.9 cm in length Fluid: Moderate volume of ascites.     Impression: 1.  Cirrhotic configuration of the liver parenchyma. No focal liver lesions. 2.  Bidirectional flow in the main portal vein at the kayla hepatis with nonocclusive intraluminal thrombus. 3.  Occluded left portal vein, with no evidence of flow in the present study. 4.  The right portal vein demonstrates retrograde flow. The anterior branch of the right portal vein is occluded with no evidence of flow. 5.  Nonobstructive right renal stones. 6.  Moderate volume of ascites. HARIS VALDEZ MD    Poc Us Abdomen Limited    [facility] Procedure Note   limited abdome PROCEDURE: PERFORMED BY: Dr. Shahid Miller INDICATIONS/SYMPTOM:  Abdominal Pain PROBE: Low frequency  convex probe BODY LOCATION: The ultrasound was performed in the abdominal areas. FINDINGS: Free fluid in the abdomen with known history of ascities   INTERPRETATION: FAST exam revealed Free fluid in abdomen consistent with known ascites. IMAGE DOCUMENTATION: Images were archived to PACs system.

## 2017-11-02 NOTE — PROGRESS NOTES
Therapy: Home Infusion  Insurance: [facility]  Ded: $5000  Met: $5000  Per plan, pt is covered 100% once ded met.    Please contact Intake with any questions, [phone] or In Basket pool, [facility] ([id number]).    [employer] 5A: in reference to admission date 10/30/2017 to check IC home infusion benefits.

## 2017-11-02 NOTE — PLAN OF CARE
Problem: Gastrointestinal Bleeding (Adult)  Goal: Signs and Symptoms of Listed Potential Problems Will be Absent, Minimized or Managed (Gastrointestinal Bleeding)  Signs and symptoms of listed potential problems will be absent, minimized or managed by discharge/transition of care (reference Gastrointestinal Bleeding (Adult) CPG).   Outcome: Improving  Pt a/o x 4. VSS. Good UOP. Passed one loose BM brown in color. No issues of leg pain. Pt in NAD.

## 2017-11-02 NOTE — PHARMACY-VANCOMYCIN DOSING SERVICE
Pharmacy Vancomycin Note  Date of Service 2017  Patient's  1965   52 year old, male    Indication: Bacteremia  Goal Trough Level: 15-20 mg/L  Day of Therapy: 3  Current Vancomycin regimen:  1500 mg IV q12h    Current estimated CrCl = Estimated Creatinine Clearance: 103.2 mL/min (based on Cr of 0.81).    Creatinine for last 3 days  10/30/2017:  1:45 PM Creatinine 1.11 mg/dL;  8:06 PM Creatinine 1.15 mg/dL  10/31/2017:  5:59 AM Creatinine 1.18 mg/dL  2017:  6:18 AM Creatinine 0.97 mg/dL  2017:  6:35 AM Creatinine 0.81 mg/dL    Recent Vancomycin Levels (past 3 days)  2017:  6:35 AM Vancomycin Level 15.0 mg/L    Vancomycin IV Administrations (past 72 hours)                   vancomycin (VANCOCIN) 1,500 mg in NaCl 0.9 % 250 mL intermittent infusion (mg) 1,500 mg New Bag 17 0731     1,500 mg New Bag 17 1741     1,500 mg New Bag  0616     1,500 mg New Bag 10/31/17 2058     1,500 mg New Bag  0641                Nephrotoxins and other renal medications (Future)    Start     Dose/Rate Route Frequency Ordered Stop    10/31/17 0515  vancomycin (VANCOCIN) 1,500 mg in NaCl 0.9 % 250 mL intermittent infusion      1,500 mg  over 90 Minutes Intravenous EVERY 12 HOURS 10/31/17 0503               Contrast Orders - past 72 hours     None          Interpretation of levels and current regimen:  Trough level is  Therapeutic    Has serum creatinine changed > 50% in last 72 hours: No    Urine output:  good urine output    Renal Function: Stable    Plan:  1.  Continue Current Dose  2.  Pharmacy will check trough levels as appropriate in 1-3 Days.    3. Serum creatinine levels will be ordered daily for the first week of therapy and at least twice weekly for subsequent weeks.      Tabatha Mckenzie, PharmD  Tabatha Mckenzie        .

## 2017-11-02 NOTE — PROGRESS NOTES
GI Progress Note  11/2/2017     S: Feeling well. Brown BM today. No abdominal pain. 4 point ROS otherwise negative.     O:  B/P: 120/65, T: 97.8, P: 76, R: 18   Gen: NAD  HEENT: anicteric, MMM  Neck: supple  CV: RRR  Pulm: non labored  Abd: non tender, slightly distended  Skin: no jaundice  Neuro: AAOx3, no asterixis    Labs and imaging personally reviewed.   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     RECOMMENDATIONS:  1. Large esophageal varices  2. Rectal varix  - currently on octreotide and ppi, complete 72 hours.   - varices banded x5, ADAT  - hemoglobin stable and no further evidence of bleeding.   - will need repeat EGD in ~ 1 month.  (ordered and note sent to schedulers)  - if he has BRBPR occur, he will need repeat colonoscopy to evaluate for rectal varix bleeding  - may benefit from TIPS (first hemorrhage, Payal class C, MELD 16), will defer to outpatient hepatologist Dr Bales     3.  SBP  4. Ascites  - on ceftriaxone and awaiting cultures (b-hemolytic Streptococcus group C from blood 10/30), continue for at least 5 days  - will need to be on ciprofloxacin prophylaxis for SBP following completing treatment course  - finished albumin for SBP (day 1 and 3)  - Will need to restart lasix and spironolactone later if labs stable      5. Alcohol cirrhosis  6. Non occlusive portal vein thrombosis  - MELD Na 18. Has not been evaluated for liver  transplant in the past due to low MELD.   - Was advised as outpatient no alcohol use. Last had O'Douls approx 2 months ago.   - new PVT seen on imaging of CT and US. If TIPS an option in future, may also do thrombectomy. No anticoagulation  - No evidence of HCC on imaging.       Has follow up apt with Dr. Bales on 11/8.     Discussed with attending, Dr Be.     Berlin Stoner MD  GI Fellow  [phone]     Addendum: Patient would need echocardiogram completed prior to TIPS consideration, but this can be decided at outpatient hepatology appointment.

## 2017-11-03 VITALS
HEART RATE: 82 BPM | TEMPERATURE: 97.5 F | SYSTOLIC BLOOD PRESSURE: 111 MMHG | WEIGHT: 158.9 LBS | HEIGHT: 70 IN | RESPIRATION RATE: 16 BRPM | OXYGEN SATURATION: 99 % | BODY MASS INDEX: 22.75 KG/M2 | DIASTOLIC BLOOD PRESSURE: 63 MMHG

## 2017-11-03 LAB
ALBUMIN SERPL-MCNC: 2.8 G/DL (ref 3.4–5)
ALP SERPL-CCNC: 122 U/L (ref 40–150)
ALT SERPL W P-5'-P-CCNC: 11 U/L (ref 0–70)
ANION GAP SERPL CALCULATED.3IONS-SCNC: 9 MMOL/L (ref 3–14)
AST SERPL W P-5'-P-CCNC: 27 U/L (ref 0–45)
BILIRUB SERPL-MCNC: 2.3 MG/DL (ref 0.2–1.3)
BLD PROD TYP BPU: NORMAL
BLD UNIT ID BPU: 0
BLOOD PRODUCT CODE: NORMAL
BPU ID: NORMAL
BUN SERPL-MCNC: 10 MG/DL (ref 7–30)
CALCIUM SERPL-MCNC: 7.8 MG/DL (ref 8.5–10.1)
CHLORIDE SERPL-SCNC: 111 MMOL/L (ref 94–109)
CO2 SERPL-SCNC: 18 MMOL/L (ref 20–32)
CREAT SERPL-MCNC: 0.87 MG/DL (ref 0.66–1.25)
ERYTHROCYTE [DISTWIDTH] IN BLOOD BY AUTOMATED COUNT: 18 % (ref 10–15)
GFR SERPL CREATININE-BSD FRML MDRD: >90 ML/MIN/1.7M2
GLUCOSE SERPL-MCNC: 99 MG/DL (ref 70–99)
HCT VFR BLD AUTO: 24.7 % (ref 40–53)
HGB BLD-MCNC: 7.9 G/DL (ref 13.3–17.7)
INR PPP: 1.91 (ref 0.86–1.14)
MAGNESIUM SERPL-MCNC: 1.9 MG/DL (ref 1.6–2.3)
MCH RBC QN AUTO: 27.6 PG (ref 26.5–33)
MCHC RBC AUTO-ENTMCNC: 32 G/DL (ref 31.5–36.5)
MCV RBC AUTO: 86 FL (ref 78–100)
PHOSPHATE SERPL-MCNC: 1.9 MG/DL (ref 2.5–4.5)
PLATELET # BLD AUTO: 75 10E9/L (ref 150–450)
POTASSIUM SERPL-SCNC: 4 MMOL/L (ref 3.4–5.3)
PROT SERPL-MCNC: 5.4 G/DL (ref 6.8–8.8)
RBC # BLD AUTO: 2.86 10E12/L (ref 4.4–5.9)
SODIUM SERPL-SCNC: 138 MMOL/L (ref 133–144)
TRANSFUSION STATUS PATIENT QL: NORMAL
TRANSFUSION STATUS PATIENT QL: NORMAL
WBC # BLD AUTO: 3.7 10E9/L (ref 4–11)

## 2017-11-03 PROCEDURE — 85027 COMPLETE CBC AUTOMATED: CPT | Performed by: HOSPITALIST

## 2017-11-03 PROCEDURE — 25000125 ZZHC RX 250: Performed by: HOSPITALIST

## 2017-11-03 PROCEDURE — 84100 ASSAY OF PHOSPHORUS: CPT | Performed by: HOSPITALIST

## 2017-11-03 PROCEDURE — 25000128 H RX IP 250 OP 636: Performed by: INTERNAL MEDICINE

## 2017-11-03 PROCEDURE — 83735 ASSAY OF MAGNESIUM: CPT | Performed by: HOSPITALIST

## 2017-11-03 PROCEDURE — 80053 COMPREHEN METABOLIC PANEL: CPT | Performed by: HOSPITALIST

## 2017-11-03 PROCEDURE — 36592 COLLECT BLOOD FROM PICC: CPT | Performed by: HOSPITALIST

## 2017-11-03 PROCEDURE — 25000132 ZZH RX MED GY IP 250 OP 250 PS 637: Performed by: HOSPITALIST

## 2017-11-03 PROCEDURE — 40000558 ZZH STATISTIC CVC DRESSING CHANGE

## 2017-11-03 PROCEDURE — 25000128 H RX IP 250 OP 636: Performed by: HOSPITALIST

## 2017-11-03 PROCEDURE — 85610 PROTHROMBIN TIME: CPT | Performed by: HOSPITALIST

## 2017-11-03 PROCEDURE — 99239 HOSP IP/OBS DSCHRG MGMT >30: CPT | Mod: GC | Performed by: INTERNAL MEDICINE

## 2017-11-03 RX ORDER — CEFTRIAXONE 2 G/1
2 INJECTION, POWDER, FOR SOLUTION INTRAMUSCULAR; INTRAVENOUS EVERY 24 HOURS
Qty: 10 EACH | Refills: 0
Start: 2017-11-03 | End: 2017-12-12

## 2017-11-03 RX ORDER — PANTOPRAZOLE SODIUM 40 MG/1
40 TABLET, DELAYED RELEASE ORAL DAILY
Qty: 90 TABLET | Refills: 0 | Status: SHIPPED | OUTPATIENT
Start: 2017-11-03 | End: 2018-02-07

## 2017-11-03 RX ORDER — PANTOPRAZOLE SODIUM 40 MG/1
40 TABLET, DELAYED RELEASE ORAL
Qty: 60 TABLET | Refills: 0 | Status: SHIPPED | OUTPATIENT
Start: 2017-11-03 | End: 2017-11-03

## 2017-11-03 RX ORDER — FUROSEMIDE 20 MG
20 TABLET ORAL DAILY
Qty: 30 TABLET | Refills: 0 | Status: SHIPPED | OUTPATIENT
Start: 2017-11-03 | End: 2017-11-08

## 2017-11-03 RX ORDER — CIPROFLOXACIN 500 MG/1
500 TABLET, FILM COATED ORAL DAILY
Qty: 20 TABLET | Refills: 0 | Status: SHIPPED | OUTPATIENT
Start: 2017-11-03 | End: 2017-11-03

## 2017-11-03 RX ORDER — CEFTRIAXONE 2 G/1
2 INJECTION, POWDER, FOR SOLUTION INTRAMUSCULAR; INTRAVENOUS EVERY 24 HOURS
Qty: 10 EACH | Refills: 0 | Status: SHIPPED | OUTPATIENT
Start: 2017-11-03 | End: 2017-11-03

## 2017-11-03 RX ORDER — SPIRONOLACTONE 100 MG/1
50 TABLET, FILM COATED ORAL DAILY
Qty: 30 TABLET | Refills: 0 | Status: SHIPPED | OUTPATIENT
Start: 2017-11-03 | End: 2017-11-03

## 2017-11-03 RX ORDER — FUROSEMIDE 20 MG
20 TABLET ORAL DAILY
Qty: 30 TABLET | Refills: 0 | Status: SHIPPED | OUTPATIENT
Start: 2017-11-03 | End: 2017-11-03

## 2017-11-03 RX ORDER — SPIRONOLACTONE 100 MG/1
50 TABLET, FILM COATED ORAL DAILY
Qty: 30 TABLET | Refills: 0 | Status: SHIPPED | OUTPATIENT
Start: 2017-11-03 | End: 2017-11-08

## 2017-11-03 RX ORDER — CIPROFLOXACIN 500 MG/1
500 TABLET, FILM COATED ORAL DAILY
Qty: 30 TABLET | Refills: 0 | Status: SHIPPED | OUTPATIENT
Start: 2017-11-03 | End: 2018-02-07

## 2017-11-03 RX ORDER — PANTOPRAZOLE SODIUM 40 MG/1
40 TABLET, DELAYED RELEASE ORAL DAILY
Qty: 90 TABLET | Refills: 0 | Status: SHIPPED | OUTPATIENT
Start: 2017-11-03 | End: 2017-11-03

## 2017-11-03 RX ADMIN — SODIUM CHLORIDE, PRESERVATIVE FREE 5 ML: 5 INJECTION INTRAVENOUS at 17:14

## 2017-11-03 RX ADMIN — PANTOPRAZOLE SODIUM 40 MG: 40 TABLET, DELAYED RELEASE ORAL at 17:13

## 2017-11-03 RX ADMIN — POTASSIUM PHOSPHATE, MONOBASIC AND POTASSIUM PHOSPHATE, DIBASIC 20 MMOL: 224; 236 INJECTION, SOLUTION INTRAVENOUS at 12:46

## 2017-11-03 RX ADMIN — CEFTRIAXONE SODIUM 2 G: 2 INJECTION, POWDER, FOR SOLUTION INTRAMUSCULAR; INTRAVENOUS at 15:36

## 2017-11-03 RX ADMIN — PANTOPRAZOLE SODIUM 40 MG: 40 TABLET, DELAYED RELEASE ORAL at 08:17

## 2017-11-03 NOTE — PLAN OF CARE
Problem: Patient Care Overview  Goal: Plan of Care/Patient Progress Review  Pt has been improving significantly today. Alert and oriented X 4, VSS, denied any pain, up in room independently, tolerated diet well. Paracentesis site on LLQ stopped leaking after derma-bond applied X2 in addition to pressure tape. Dressing has been c/d/i. Mepiplex dressing change done on old wound on coccyx area. Phos 1.9 this am, replaced. Pt went to [facility] at mid-line teaching at 1345 for home IV antibx. Rocephin given when pt came back from [facility]. Discharge instruction given with wife at the bedside. Pt was accompanied home by wife.

## 2017-11-03 NOTE — PLAN OF CARE
Pt A&Ox4 VSS on room air. Independent to bathroom, no BM overnight. Midline SL. Heat to site for discomfort. PLC appointment for 1345 today. Phos recheck this AM. Continue with POC.

## 2017-11-03 NOTE — DISCHARGE SUMMARY
Medicine Discharge Summary  Ivan Bell MRN: [id number]  1965  Primary care provider: Rosette Wills  ___________________________________          Date of Admission:  10/30/2017  Date of Discharge:  11/3/2017   Admitting Physician:  Stepan Rick MD  Discharge Physician:  Eric May MD   Discharging Service:  Internal Medicine, [place] [id number]     Primary Provider: Rosette Wills         Reason for Admission:   Ivan Bell is a 52 year old male admitted on 10/30/2017. He has a history of alcoholic cirrhosis complicated by ascites and esophageal varices admitted for bright red blood per rectum in setting of recent therapeutic paracentesis.  Also, found to be febrile on admission.           Discharge Diagnosis:   Upper gastrointestinal bleed  Acute blood loss anemia  Grade II esophageal varices s/p banding   Spontaneous bacterial peritonitis  Beta hemolytic group C streptococcus bacteremia  Portal venous thrombosis  Alcoholic cirrhosis c/b ascites and HE  Left lower extremity rash         Procedures & Significant Findings:   CT abdomen/pelvis w/ contrast (10/30) -   1. Cirrhosis of the liver. Thrombosis of the left portal vein and anterior branch of the right portal vein. Nonocclusive thrombus is noted in the main portal vein as above. Distal esophageal varices.  2. Splenomegaly with evidence of a new infarct involving the anterosuperior spleen.  3. Moderate amount of ascites.  4. Nephrolithiasis bilaterally. There also appears to be a stone in the mid distal left ureter. No significant left hydroureter or hydronephrosis.  5. Gallstones.    US abdomen w/ doppler (10/30) -  1.  Cirrhotic configuration of the liver parenchyma. No focal liver lesions.  2.  Bidirectional flow in the main portal vein at the kayla hepatis with nonocclusive intraluminal thrombus.  3.  Occluded left portal vein, with no evidence of flow in the present  study.  4.  The right portal vein demonstrates retrograde flow. The anterior branch of the right portal vein is occluded with no evidence of flow.  5.  Nonobstructive right renal stones.  6.  Moderate volume of ascites.    US left lower extremity (10/30) -   No evidence of deep venous thrombosis.  Probable ascites in the right inguinal region    CT left lower extremity (10/30) -   1. No osseous lesion involving the femur. No intramuscular hematoma  2. Mild subcutaneous streakiness involving the medial and posterior thigh, believed to be related to contrast from yesterday's CT of the abdomen.    Endoscopy (10/31) -   - Normal examined duodenum.   - Portal hypertensive gastropathy.   - Non-bleeding grade III esophageal varices. Completely eradicated. Banded.   - No specimens collected.     Flexible Sigmoidoscopy (10/31) -   - Preparation of the colon was poor.   - Non-thrombosed external hemorrhoids found on perianal exam.   - No signs of bleeding   - Internal hemorrhoids.   - Rectal varix without high risk stigmata.   - No specimens collected.          Consultations:   Gastroenterology         Hospital Course by Problem:    # Upper gastrointestinal bleed  # Acute blood loss anemia  # Grade II esophageal varices s/p banding   Two episodes of bright red blood per rectum.  Hemoglobin drop from 8.8 to 6.5 in ED.  Stabilized after 3 units of pRBC's. Gastroenterology completed EGD and 10/31 that was notable for portal hypertensive gastropathy and grade III esophageal varices that were completed eradicated with banding.  Flexible sigmoidoscopy was also completed and was notable for rectal varix without high risk stigmata for bleeding.  Patient was continued on pantoprazole and octreotide drip for 72 hours.  Transitioned to pantoprazole 40 mg BID prior to discharge.  Also, received 3 doses of 10 mg of IV vitamin K.  Patient will need repeat EGD in 1 month.  He will continue pantoprazole 40 mg BID until follow up EGD.      #  Spontaneous bacterial peritonitis  Diagnostic paracentesis completed on admission with 7800 WBC's and 74% neutrophils.  Culture of the fluid remained negative.  Started on ceftriaxone 2 g daily.  Following completion of course he will start ciprofloxacin 500 mg daily for prophylaxis.      # Beta hemolytic group C streptococcus bacteremia  2/2 blood cultures from admission positive for beta hemolytic group C streptococcus.  Possible abdominal source, but ascitic fluid culture remained negative.  Started on vancomycin initially, but transitioned to ceftriaxone 2 g daily once speciated.  Repeat blood cultures remained negative.  Midline was placed prior to discharge and patient will continue ceftriaxone 2 g daily to complete 2 week treatment course.      # Portal venous thrombosis  CT abdomen obtained on admission with thrombosis of left portal vein and anterior branch of right portal vein. Non-occlusive thrombus noted in main portal vein. Not a candidate for anticoagulation given acute bleed.  Gastroenterology evaluated and presumed that it was chronic. Will follow up with Gastroenterology in clinic on 11/8 (Dr. Bales).     # Alcoholic cirrhosis c/b ascites and HE  Resumed aldactone at 50 mg daily at discharged and started lasix at 20 mg daily for management of ascetics.  Will follow up with Gastroenterology in clinic on 11/8 (Dr. Bales).  For MELD at discharge see below.   MELD-Na score: 17 at 11/3/2017  7:37 AM  MELD score: 17 at 11/3/2017  7:37 AM  Calculated from:  Serum Creatinine: 0.87 mg/dL (Rounded to 1) at 11/3/2017  7:37 AM  Serum Sodium: 138 mmol/L (Rounded to 137) at 11/3/2017  7:37 AM  Total Bilirubin: 2.3 mg/dL at 11/3/2017  7:37 AM  INR(ratio): 1.91 at 11/3/2017  7:37 AM  Age: 52 years    # Left lower extremity rash  Noted to have a tender erythematous area left medial thigh on admission.  US negative for DVT.  CT negative.  CK level within normal limits.  Unclear source.  Self resolved over the course of  "the admission.     Physical Exam on day of Discharge:  Blood pressure 116/64, pulse 82, temperature 97.8  F (36.6  C), temperature source Oral, resp. rate 16, height 1.778 m (5' 10\"), weight 72.1 kg (158 lb 14.4 oz), SpO2 98 %.  General  - sitting up in bed, no acute distress  HEENT - NC/AT, no scleral icterus, mmm  Cardiovascular - RRR, normal S1/S2, no murmur appreciated  Pulmonary - CTAB, no wheezing or crackles appreciated, normal respiratory effort on room air  Abdomen - soft, non-tender, mild distension due to ascities  Extremities - warm and well perfused, no peripheral edema in bilateral lower extremities  Neurologic - alert and oriented, no gross focal deficits, no asterixis  Psych - normal mood and affect  Skin - erythematous area on medial side of left leg dissipated     Lines/Tubes:  left midline         Pending Results:   Ascitic fluid culture - negative to date  Blood cultures - negative to date         Discharge Medications:     Current Discharge Medication List      START taking these medications    Details   furosemide (LASIX) 20 MG tablet Take 1 tablet (20 mg) by mouth daily  Qty: 30 tablet, Refills: 0    Associated Diagnoses: Alcoholic cirrhosis of liver with ascites (H)      ciprofloxacin (CIPRO) 500 MG tablet Take 1 tablet (500 mg) by mouth daily Start taking after you complete course of IV ceftriaxone.  Qty: 20 tablet, Refills: 0    Associated Diagnoses: SBP (spontaneous bacterial peritonitis) (H)      cefTRIAXone (ROCEPHIN) 2 GM vial Inject 2 g into the vein every 24 hours  Qty: 10 each, Refills: 0    Associated Diagnoses: Bacteremia      pantoprazole (PROTONIX) 40 MG EC tablet Take 1 tablet (40 mg) by mouth daily  Qty: 90 tablet, Refills: 0    Associated Diagnoses: Secondary esophageal varices with bleeding (H)         CONTINUE these medications which have CHANGED    Details   spironolactone (ALDACTONE) 100 MG tablet Take 0.5 tablets (50 mg) by mouth daily  Qty: 30 tablet, Refills: 0    " Associated Diagnoses: Alcoholic cirrhosis of liver with ascites (H)         CONTINUE these medications which have NOT CHANGED    Details   SILDENAFIL CITRATE PO Take 10 mg by mouth daily      Pyridoxine HCl (VITAMIN B6 PO) Take by mouth daily      lactulose (CHRONULAC) 10 GM/15ML solution Take 15 g by mouth 2 times daily       Acetaminophen (TYLENOL PO) Take by mouth every 4 hours as needed for mild pain or fever      MULTIPLE VITAMINS PO Take 1 tablet by mouth daily                  Discharge Instructions and Follow-Up:     Discharge Procedure Orders  GASTROENTEROLOGY ADULT REF PROCEDURE ONLY     Home infusion referral     Medication Therapy Management Referral   Referral Type: Med Therapy Management     Reason for your hospital stay   Order Comments: You were admitted with an upper gastrointestinal bleed.  An endoscopy was completed and esophageal varices were found.  Banding was completed.    You were also found to have spontaneous bacterial peritonitis (infection of your ascites) and bacteremia (infection of your blood).  You were started on antibiotics for this and will be continued on the intravenous antibiotics at discharge.     Adult [facility] Follow-up and recommended labs and tests   Order Comments: Follow up with Dr. Bales on 11/8/2018. The following labs/tests are recommended: BMP, hepatic panel, CBC, INR.    Appointments on [place] and/or [facility] (with [facility] or [facility] provider or service). Call [phone] if you haven't heard regarding these appointments within 7 days of discharge.     Activity   Order Comments: Your activity upon discharge: activity as tolerated   Order Specific Question Answer Comments   Is discharge order? Yes      Discharge Instructions   Order Comments: You have follow up scheduled with Gastroenterology on 11/8/2017.  You should have labs completed before that appointment.  You will also need a repeat endoscopy in 1 month after the banding.  You will need to continue the  pantoprazole 40 mg twice daily until you have the endoscopy.  They will advise you on the long term plan for this medication after.      For the infection of your ascites and your blood infection you will need to continue the ceftriaxone intravenously for 10 more doses. After you complete the last dose you will start taking the ciprofloxacin daily for prophylaxis to prevent recurrence.  Recommend that your reduce the dose of your spironolactone to 50 mg daily (previously 100 mg).  Will be adding another medication call furosemide (or lasix) 20 mg daily which should help prevent re-accumulation of the fluid.     Full Code     Diet   Order Comments: Follow this diet upon discharge: low salt diet   Order Specific Question Answer Comments   Is discharge order? Yes        IV access: left midline         Discharge Disposition:   home         Condition on Discharge:   Discharge condition: Stable   Code status on discharge: Full Code        Date of service: 11/3/2017    The patient was discussed with Dr. May.    Precious Luke MD, MS  Internal Medicine, PGY-2

## 2017-11-03 NOTE — PLAN OF CARE
"11/3/17 Patient(pt)and wife correctly returned all mid line/IV medication skills using [facility] supplies,asked a few questions,able to answer \"teach back\",and verbalized understanding of content presented.See education flow sheet.Home care to follow.All written material given and reviewed at today's appointment.    "

## 2017-11-04 LAB
BACTERIA SPEC CULT: ABNORMAL
BACTERIA SPEC CULT: NO GROWTH
Lab: ABNORMAL
SPECIMEN SOURCE: ABNORMAL
SPECIMEN SOURCE: NORMAL

## 2017-11-05 LAB
BACTERIA SPEC CULT: NO GROWTH
BACTERIA SPEC CULT: NO GROWTH
SPECIMEN SOURCE: NORMAL
SPECIMEN SOURCE: NORMAL

## 2017-11-06 ENCOUNTER — TELEPHONE (OUTPATIENT)
Dept: PHARMACY | Facility: OTHER | Age: 52
End: 2017-11-06

## 2017-11-06 ENCOUNTER — HOSPITAL ENCOUNTER (EMERGENCY)
Facility: CLINIC | Age: 52
Discharge: HOME | End: 2017-11-06
Attending: EMERGENCY MEDICINE | Admitting: EMERGENCY MEDICINE
Payer: COMMERCIAL

## 2017-11-06 ENCOUNTER — TELEPHONE (OUTPATIENT)
Dept: FAMILY MEDICINE | Facility: CLINIC | Age: 52
End: 2017-11-06

## 2017-11-06 VITALS
BODY MASS INDEX: 22.68 KG/M2 | WEIGHT: 158.07 LBS | OXYGEN SATURATION: 100 % | HEART RATE: 87 BPM | SYSTOLIC BLOOD PRESSURE: 118 MMHG | TEMPERATURE: 97.8 F | RESPIRATION RATE: 18 BRPM | DIASTOLIC BLOOD PRESSURE: 78 MMHG

## 2017-11-06 DIAGNOSIS — T14.8XXA DRAINAGE FROM WOUND: ICD-10-CM

## 2017-11-06 DIAGNOSIS — T81.89XA NON-HEALING SURGICAL WOUND: ICD-10-CM

## 2017-11-06 DIAGNOSIS — K70.31 ALCOHOLIC CIRRHOSIS OF LIVER WITH ASCITES (H): ICD-10-CM

## 2017-11-06 PROCEDURE — 99282 EMERGENCY DEPT VISIT SF MDM: CPT | Mod: 25

## 2017-11-06 PROCEDURE — 99282 EMERGENCY DEPT VISIT SF MDM: CPT | Mod: 25 | Performed by: EMERGENCY MEDICINE

## 2017-11-06 PROCEDURE — 27210282 ZZH ADHESIVE DERMABOND SKIN

## 2017-11-06 PROCEDURE — 12001 RPR S/N/AX/GEN/TRNK 2.5CM/<: CPT | Mod: Z6 | Performed by: EMERGENCY MEDICINE

## 2017-11-06 PROCEDURE — 12001 RPR S/N/AX/GEN/TRNK 2.5CM/<: CPT

## 2017-11-06 ASSESSMENT — ENCOUNTER SYMPTOMS
ROS GI COMMENTS: SEE HPI.
SHORTNESS OF BREATH: 0
FEVER: 0
ABDOMINAL PAIN: 0

## 2017-11-06 NOTE — DISCHARGE INSTRUCTIONS
Leave bandage in place until follow up Wednesday.    Would leave suture/stitch in place for one week to 10 days.          Ascites    Ascites (pronounced [id number]) is fluid collecting in the abdomen (stomach area). Symptoms include swelling of the abdomen and a feeling of pressure. Shortness of breath may also occur. In severe cases, the feet, ankles and legs may also swell.   There are many causes of ascites. The most common are related to the liver. They include:    Cirrhosis of the liver, which may be due to hepatitis B, hepatitis C, long-term alcohol abuse, nonalcoholic steatohepatitis (TOMAS) and others    Diseases such as heart failure, kidney failure, pancreatitis, or cancer  To treat the condition, a low-salt diet may be recommended. Medicines that help fluid leave the body (diuretics) may be prescribed. In some cases, a procedure is done to drain the abdomen of fluid. This is called paracentesis. Unless the underlying cause is treated, the fluid is likely to return.  If liver damage is due to alcohol, stopping all alcohol will help slow the progress of the disease. If liver damage is from hepatitis B or C, treatments may be given to fight the virus. If liver damage becomes life threatening, a liver transplant may be needed.  Home care    Certain medicines can worsen liver damage. Talk to your healthcare provider or pharmacist about any medicines you currently take. Ask your healthcare provider or pharmacist before taking any new medicines. Also ask before taking herbs, vitamins, or minerals. Certain ones affect the liver.    Do not taking acetaminophen or ibuprofen without taking to your healthcare provider first. Both can affect your liver.     Stop all alcohol use. If you abuse alcohol, talk to your healthcare provider about getting help and support to stop.     If you use IV drugs, seek help to stop. Never share needles or other equipment.      If you have cirrhosis from diabetes, obesity, or metabolic  syndrome (associated with non-alcoholic steatohepatitis), treatment of the underlying condition is critical. So is exercise and weight loss.  Follow-up care  Follow up with your healthcare provider as advised. If a culture was done on the ascitic fluid, or imaging, or other labs were done, call as directed for the results. Depending on the results, your treatment may change.  The following sources can tell you more about ascites and help you find support.    [employer] 653-224-1344 www.liverfoundation.org    Hepatitis [employer] International www.hepfi.org    Alcoholics Anonymous www.aa.org    [employer] [place] on Alcoholism and Drug Dependence 714-689-6369 www.ncadd.org  When to seek medical advice  Call your healthcare provider right away if you have any of the following:    Sudden weight gain with increased size of your abdomen or leg swelling    Increasing jaundice (yellowing of skin or eyes)    Excess bleeding from cuts or injuries    Blood in vomit or stool (black or red color)    Trouble breathing    Increasing abdominal pain    Confusion    Fever of 100.4 F (38 C) or higher, or as directed by your healthcare provider  Date Last Reviewed: 6/1/2017 2000-2017 [employer]. [address] [place] [address], [place] [address]. All rights reserved. This information is not intended as a substitute for professional medical care. Always follow your healthcare professional's instructions.

## 2017-11-06 NOTE — ED AVS SNAPSHOT
[facility] Emergency Department    [address] [facility] [address]    Phone:  [phone]    Fax:  [phone]                                       Ivan Bell   MRN: [id number]    Department:  [facility] Emergency Department   Date of Visit:  11/6/2017           After Visit Summary Signature Page     I have received my discharge instructions, and my questions have been answered. I have discussed any challenges I see with this plan with the nurse or doctor.    ..........................................................................................................................................  Patient/Patient Representative Signature      ..........................................................................................................................................  Patient Representative Print Name and Relationship to Patient    ..................................................               ................................................  Date                                            Time    ..........................................................................................................................................  Reviewed by Signature/Title    ...................................................              ..............................................  Date                                                            Time

## 2017-11-06 NOTE — ED NOTES
Leaking from paracentesis site started around 2300 after changing dressing at 1900 hrs  Denies any pain or discomfort no fevers otherwise feeling well  Was discharged from hospital on Friday had suture removed form site while in hospital   Drove self here

## 2017-11-06 NOTE — TELEPHONE ENCOUNTER
MTM referral from: Transitions of Care (recent hospital discharge or ED visit)    MTM referral outreach attempt #1 on November 6, 2017 at 3:24 PM      Outcome: No Answer    Bianca Do MTM Coordinator

## 2017-11-06 NOTE — ED AVS SNAPSHOT
[facility] Emergency Department    [address] [address]    Phone:  [phone]    Fax:  [phone]                                       Ivan Bell   MRN: [id number]    Department:  [facility] Emergency Department   Date of Visit:  11/6/2017           Patient Information     Date Of Birth          1965        Your diagnoses for this visit were:     Drainage from wound     Alcoholic cirrhosis of liver with ascites (H)        You were seen by Shahid Miller MD.        Discharge Instructions       Leave bandage in place until follow up Wednesday.    Would leave suture/stitch in place for one week to 10 days.          Ascites    Ascites (pronounced xh-naak-chjx) is fluid collecting in the abdomen (stomach area). Symptoms include swelling of the abdomen and a feeling of pressure. Shortness of breath may also occur. In severe cases, the feet, ankles and legs may also swell.   There are many causes of ascites. The most common are related to the liver. They include:    Cirrhosis of the liver, which may be due to hepatitis B, hepatitis C, long-term alcohol abuse, nonalcoholic steatohepatitis (TOMAS) and others    Diseases such as heart failure, kidney failure, pancreatitis, or cancer  To treat the condition, a low-salt diet may be recommended. Medicines that help fluid leave the body (diuretics) may be prescribed. In some cases, a procedure is done to drain the abdomen of fluid. This is called paracentesis. Unless the underlying cause is treated, the fluid is likely to return.  If liver damage is due to alcohol, stopping all alcohol will help slow the progress of the disease. If liver damage is from hepatitis B or C, treatments may be given to fight the virus. If liver damage becomes life threatening, a liver transplant may be needed.  Home care    Certain medicines can worsen liver damage. Talk to your healthcare provider or pharmacist about any medicines you currently take.  Ask your healthcare provider or pharmacist before taking any new medicines. Also ask before taking herbs, vitamins, or minerals. Certain ones affect the liver.    Do not taking acetaminophen or ibuprofen without taking to your healthcare provider first. Both can affect your liver.     Stop all alcohol use. If you abuse alcohol, talk to your healthcare provider about getting help and support to stop.     If you use IV drugs, seek help to stop. Never share needles or other equipment.      If you have cirrhosis from diabetes, obesity, or metabolic syndrome (associated with non-alcoholic steatohepatitis), treatment of the underlying condition is critical. So is exercise and weight loss.  Follow-up care  Follow up with your healthcare provider as advised. If a culture was done on the ascitic fluid, or imaging, or other labs were done, call as directed for the results. Depending on the results, your treatment may change.  The following sources can tell you more about ascites and help you find support.    American [employer] Foundation [phone] www.liverfoundation.org    Hepatitis Foundation International www.hepfi.org    Alcoholics Anonymous www.aa.org    National [place] on Alcoholism and Drug Dependence 633-105-4455 www.ncadd.org  When to seek medical advice  Call your healthcare provider right away if you have any of the following:    Sudden weight gain with increased size of your abdomen or leg swelling    Increasing jaundice (yellowing of skin or eyes)    Excess bleeding from cuts or injuries    Blood in vomit or stool (black or red color)    Trouble breathing    Increasing abdominal pain    Confusion    Fever of 100.4 F (38 C) or higher, or as directed by your healthcare provider  Date Last Reviewed: 6/1/2017 2000-2017 [employer]. [address] [place] [address], [place] [address]. All rights reserved. This information is not intended as a substitute for professional medical care. Always follow your  healthcare professional's instructions.          Future Appointments        Provider Department Dept Phone Center    11/8/2017 9:15 AM Lab  Health Lab [phone] [facility]    11/8/2017 10:00 AM Gonzalo Bales MD [facility] Hepatology [phone] [facility]    11/28/2017 7:45 AM Lab  Health Lab [phone] [facility]    11/28/2017 8:40 AM Gonzalo Bales MD [facility] Hepatology [phone] [facility]    11/28/2017 9:00 AM Diana Heaton RD [facility] Solid Organ Transplant [phone] [facility]      24 Hour Appointment Hotline       To make an appointment at any [facility], call [phone] ([phone]). If you don't have a family doctor or clinic, we will help you find one. [facility] are conveniently located to serve the needs of you and your family.             Review of your medicines      Our records show that you are taking the medicines listed below. If these are incorrect, please call your family doctor or clinic.        Dose / Directions Last dose taken    cefTRIAXone 2 GM vial   Commonly known as:  ROCEPHIN   Dose:  2 g   Indication:  Infection Within the Abdomen   Quantity:  10 each        Inject 2 g into the vein every 24 hours   Refills:  0        ciprofloxacin 500 MG tablet   Commonly known as:  CIPRO   Dose:  500 mg   Quantity:  30 tablet        Take 1 tablet (500 mg) by mouth daily Start taking after you complete course of IV ceftriaxone.   Refills:  0        furosemide 20 MG tablet   Commonly known as:  LASIX   Dose:  20 mg   Quantity:  30 tablet        Take 1 tablet (20 mg) by mouth daily   Refills:  0        lactulose 10 GM/15ML solution   Commonly known as:  CHRONULAC   Dose:  15 g        Take 15 g by mouth 2 times daily   Refills:  0        MULTIPLE VITAMINS PO   Dose:  1 tablet        Take 1 tablet by mouth daily   Refills:  0        pantoprazole 40 MG EC tablet   Commonly known as:  PROTONIX   Dose:  40 mg   Quantity:  90 tablet        Take 1 tablet (40 mg) by mouth daily   Refills:  0         SILDENAFIL CITRATE PO   Dose:  10 mg        Take 10 mg by mouth daily   Refills:  0        spironolactone 100 MG tablet   Commonly known as:  ALDACTONE   Dose:  50 mg   Quantity:  30 tablet        Take 0.5 tablets (50 mg) by mouth daily   Refills:  0        TYLENOL PO        Take by mouth every 4 hours as needed for mild pain or fever   Refills:  0        VITAMIN B6 PO        Take by mouth daily   Refills:  0                Orders Needing Specimen Collection     None      Pending Results     No orders found from 11/4/2017 to 11/7/2017.            Pending Culture Results     No orders found from 11/4/2017 to 11/7/2017.            Pending Results Instructions     If you had any lab results that were not finalized at the time of your Discharge, you can call the ED Lab Result RN at [phone]. You will be contacted by this team for any positive Lab results or changes in treatment. The nurses are available 7 days a week from 10A to 6:30P.  You can leave a message 24 hours per day and they will return your call.        Test Results From Your Hospital Stay               Thank you for choosing [facility]       Thank you for choosing [facility] for your care. Our goal is always to provide you with excellent care. Hearing back from our patients is one way we can continue to improve our services. Please take a few minutes to complete the written survey that you may receive in the mail after you visit with us. Thank you!        [facility] Information     [employer] gives you secure access to your electronic health record. If you see a primary care provider, you can also send messages to your care team and make appointments. If you have questions, please call your primary care clinic.  If you do not have a primary care provider, please call [phone] and they will assist you.        Care EveryWhere ID     This is your Care EveryWhere ID. This could be used by other organizations to access your [facility]  records  [id number]        Equal Access to Services     KAITLYN CAMACHO : Elie Myles, sarah guevara, jeet cabral, jenny molina. So [facility] [phone].    ATENCIÓN: Si habla español, tiene a lee disposición servicios gratuitos de asistencia lingüística. Llame al [phone].    We comply with applicable federal civil rights laws and [place] laws. We do not discriminate on the basis of race, color, national origin, age, disability, sex, sexual orientation, or gender identity.            After Visit Summary       This is your record. Keep this with you and show to your community pharmacist(s) and doctor(s) at your next visit.

## 2017-11-07 NOTE — TELEPHONE ENCOUNTER
ED / Discharge Outreach Protocol    Patient Contact    Attempt # 2    Was call answered?  No.  Unable to leave message. No voicemail set up

## 2017-11-07 NOTE — TELEPHONE ENCOUNTER
MTM referral from: Transitions of Care (recent hospital discharge or ED visit)    MTM referral outreach attempt #2 on November 7, 2017 at 1:37 PM      Outcome: Patient not reachable after several attempts, will route to MTM Pharmacist/Provider as an FYI. Thank you for the referral.    Beverly Bull, MTM Coordinator Intern

## 2017-11-08 ENCOUNTER — OFFICE VISIT (OUTPATIENT)
Dept: GASTROENTEROLOGY | Facility: CLINIC | Age: 52
End: 2017-11-08
Attending: INTERNAL MEDICINE
Payer: COMMERCIAL

## 2017-11-08 VITALS
HEART RATE: 99 BPM | WEIGHT: 167.7 LBS | SYSTOLIC BLOOD PRESSURE: 133 MMHG | HEIGHT: 70 IN | OXYGEN SATURATION: 100 % | BODY MASS INDEX: 24.01 KG/M2 | RESPIRATION RATE: 16 BRPM | TEMPERATURE: 98.2 F | DIASTOLIC BLOOD PRESSURE: 86 MMHG

## 2017-11-08 DIAGNOSIS — K70.31 ALCOHOLIC CIRRHOSIS OF LIVER WITH ASCITES (H): ICD-10-CM

## 2017-11-08 DIAGNOSIS — I85.10 SECONDARY ESOPHAGEAL VARICES WITHOUT BLEEDING (H): Primary | ICD-10-CM

## 2017-11-08 PROBLEM — K72.90 DECOMPENSATED HEPATIC CIRRHOSIS (H): Status: RESOLVED | Noted: 2017-10-30 | Resolved: 2017-11-08

## 2017-11-08 PROBLEM — K74.60 DECOMPENSATED HEPATIC CIRRHOSIS (H): Status: RESOLVED | Noted: 2017-10-30 | Resolved: 2017-11-08

## 2017-11-08 PROBLEM — K92.2 GI BLEED: Status: RESOLVED | Noted: 2017-10-30 | Resolved: 2017-11-08

## 2017-11-08 LAB
ALBUMIN SERPL-MCNC: 3.1 G/DL (ref 3.4–5)
ALP SERPL-CCNC: 363 U/L (ref 40–150)
ALT SERPL W P-5'-P-CCNC: 17 U/L (ref 0–70)
ANION GAP SERPL CALCULATED.3IONS-SCNC: 9 MMOL/L (ref 3–14)
AST SERPL W P-5'-P-CCNC: 40 U/L (ref 0–45)
BILIRUB DIRECT SERPL-MCNC: 1.1 MG/DL (ref 0–0.2)
BILIRUB SERPL-MCNC: 2 MG/DL (ref 0.2–1.3)
BUN SERPL-MCNC: 8 MG/DL (ref 7–30)
CALCIUM SERPL-MCNC: 8.2 MG/DL (ref 8.5–10.1)
CHLORIDE SERPL-SCNC: 109 MMOL/L (ref 94–109)
CO2 SERPL-SCNC: 19 MMOL/L (ref 20–32)
CREAT SERPL-MCNC: 0.84 MG/DL (ref 0.66–1.25)
ERYTHROCYTE [DISTWIDTH] IN BLOOD BY AUTOMATED COUNT: 18.6 % (ref 10–15)
GFR SERPL CREATININE-BSD FRML MDRD: >90 ML/MIN/1.7M2
GLUCOSE SERPL-MCNC: 92 MG/DL (ref 70–99)
HCT VFR BLD AUTO: 27.9 % (ref 40–53)
HGB BLD-MCNC: 8.9 G/DL (ref 13.3–17.7)
INR PPP: 1.73 (ref 0.86–1.14)
MCH RBC QN AUTO: 27.5 PG (ref 26.5–33)
MCHC RBC AUTO-ENTMCNC: 31.9 G/DL (ref 31.5–36.5)
MCV RBC AUTO: 86 FL (ref 78–100)
PLATELET # BLD AUTO: 161 10E9/L (ref 150–450)
POTASSIUM SERPL-SCNC: 3.7 MMOL/L (ref 3.4–5.3)
PROT SERPL-MCNC: 6.8 G/DL (ref 6.8–8.8)
RBC # BLD AUTO: 3.24 10E12/L (ref 4.4–5.9)
SODIUM SERPL-SCNC: 137 MMOL/L (ref 133–144)
WBC # BLD AUTO: 5.4 10E9/L (ref 4–11)

## 2017-11-08 PROCEDURE — 80048 BASIC METABOLIC PNL TOTAL CA: CPT | Performed by: INTERNAL MEDICINE

## 2017-11-08 PROCEDURE — 85610 PROTHROMBIN TIME: CPT | Performed by: INTERNAL MEDICINE

## 2017-11-08 PROCEDURE — 99212 OFFICE O/P EST SF 10 MIN: CPT | Mod: ZF

## 2017-11-08 PROCEDURE — 36415 COLL VENOUS BLD VENIPUNCTURE: CPT | Performed by: INTERNAL MEDICINE

## 2017-11-08 PROCEDURE — 80076 HEPATIC FUNCTION PANEL: CPT | Performed by: INTERNAL MEDICINE

## 2017-11-08 PROCEDURE — 85027 COMPLETE CBC AUTOMATED: CPT | Performed by: INTERNAL MEDICINE

## 2017-11-08 RX ORDER — SPIRONOLACTONE 100 MG/1
100 TABLET, FILM COATED ORAL DAILY
Qty: 30 TABLET | Refills: 0
Start: 2017-11-08 | End: 2017-11-20

## 2017-11-08 RX ORDER — FUROSEMIDE 20 MG
40 TABLET ORAL DAILY
Qty: 30 TABLET | Refills: 0
Start: 2017-11-08 | End: 2017-11-20

## 2017-11-08 RX ORDER — CARVEDILOL 6.25 MG/1
6.25 TABLET ORAL DAILY
Qty: 60 TABLET | Refills: 1 | Status: SHIPPED | OUTPATIENT
Start: 2017-11-08 | End: 2018-01-09

## 2017-11-08 ASSESSMENT — PAIN SCALES - GENERAL: PAINLEVEL: MILD PAIN (2)

## 2017-11-08 NOTE — PROGRESS NOTES
[facility]    Hepatology follow-up    Follow-up visit for cirrhosis    Subjective:  52 year old male    Cirrhosis  - ETOH, last ETOH 2/14/16  - hx ascites, HE  - no hx variceal bleed  - last EGD March 2016- small EV, mod portal hypertensive gastropathy  - HCC screening- CT A/P with IV contrast Oct 2017    Patient comes to clinic this morning with his wife and daughter for follow-up of cirrhosis and recent hospital admission.  Last clinic visit 04/2017.    The patient was discharged from hospital last week following a variceal bleed complicated with e.coli bacteremia secondary to SBP.  Patient initially presented to the hospital with hematochezia.  He underwent EGD 10/31/2017 which showed a large esophageal varices which were banded.  He also underwent flexible sigmoidoscopy which may have shown rectal varices.  The patient was discharged to home on IV antibiotics.  He was not started on a non-selective beta blocker.  TIPS was considered as the patient was technically Child class C but the decision was deferred to this clinic visit.      Of note, the patient presented to ER with new onset ascites the day before admission to hospital with bleeding.  A new portal vein thrombosis was seen on CT with IV contrast.  It was decided not to anticoagulate the patient in context of his GI bleed.     The patient is feeling better compared to admission.  He has ongoing ascites.  He is taking low-dose furosemide and spironolactone.  He is not adherent to a low-salt diet: follow-up with a nutritionist has been scheduled for later this month.      The patient denies any melena, hematemesis or hematochezia.      He denies jaundice, lower extremity edema or confusion.      No history of fevers, sweats or chills.  Weight has increased since last clinic visit related to ascites.      The patient last drank alcohol 6 weeks ago.  He had a beer with his brother-in-law around that time.       Medical hx Surgical  hx   Past Medical History:   Diagnosis Date     Ascites      Cirrhosis (H)      Esophageal varices (H)      Hepatitis      Hypertension      Left calcaneus fracture 1/9/2006     Portal vein thrombosis       Past Surgical History:   Procedure Laterality Date     ANKLE SURGERY Left      COLONOSCOPY N/A 3/31/2016    Procedure: COLONOSCOPY;  Surgeon: Rhys Uriostegui MD;  Location:  GI     ESOPHAGOSCOPY, GASTROSCOPY, DUODENOSCOPY (EGD), COMBINED N/A 3/31/2016    Procedure: COMBINED ESOPHAGOSCOPY, GASTROSCOPY, DUODENOSCOPY (EGD);  Surgeon: Rhys Uriostegui MD;  Location: [facility]     KNEE SURGERY Left      KNEE SURGERY Right      SIGMOIDOSCOPY FLEXIBLE N/A 10/31/2017    Procedure: SIGMOIDOSCOPY FLEXIBLE;;  Surgeon: Armaan Adams MD;  Location:  GI          Medications  Prior to Admission medications    Medication Sig Start Date End Date Taking? Authorizing Provider   cefTRIAXone (ROCEPHIN) 2 GM vial Inject 2 g into the vein every 24 hours 11/3/17   Precious Luke MD   furosemide (LASIX) 20 MG tablet Take 1 tablet (20 mg) by mouth daily 11/3/17   Precious Luke MD   spironolactone (ALDACTONE) 100 MG tablet Take 0.5 tablets (50 mg) by mouth daily 11/3/17   Precious Luke MD   ciprofloxacin (CIPRO) 500 MG tablet Take 1 tablet (500 mg) by mouth daily Start taking after you complete course of IV ceftriaxone. 11/3/17   Precious Luke MD   pantoprazole (PROTONIX) 40 MG EC tablet Take 1 tablet (40 mg) by mouth daily 11/3/17   Precious Luke MD   SILDENAFIL CITRATE PO Take 10 mg by mouth daily    Unknown, Entered By History   Pyridoxine HCl (VITAMIN B6 PO) Take by mouth daily    Unknown, Entered By History   lactulose (CHRONULAC) 10 GM/15ML solution Take 15 g by mouth 2 times daily     Reported, Patient   Acetaminophen (TYLENOL PO) Take by mouth every 4 hours as needed for mild pain or fever    Reported, Patient   MULTIPLE VITAMINS PO Take 1 tablet by mouth daily     "Reported, Patient       Allergies  Allergies   Allergen Reactions     Benadryl [Diphenhydramine] Other (See Comments)     Delirium (visual and auditory hallucinations)       Review of systems  A 10-point review of systems was negative    Examination  /86  Pulse 99  Temp 98.2  F (36.8  C) (Oral)  Resp 16  Ht 1.778 m (5' 10\")  Wt 76.1 kg (167 lb 11.2 oz)  SpO2 100%  BMI 24.06 kg/m2  Body mass index is 24.06 kg/(m^2).    Gen- well, NAD, A+Ox3, normal color  CVS- RRR  RS- reduced air entry and dull to percuss L base, no wheeze or crackles  Abd- distended, soft, non-tender, dull to percuss, no obvious organomegaly, BS+  Extr- hands normal, no BESSY  Skin- no rash or jaundice  Neuro- no asterixis  Psych- normal mood    Laboratory  Lab Results   Component Value Date     11/03/2017    POTASSIUM 4.0 11/03/2017    CHLORIDE 111 11/03/2017    CO2 18 11/03/2017    BUN 10 11/03/2017    CR 0.87 11/03/2017       Lab Results   Component Value Date    BILITOTAL 2.3 11/03/2017    ALT 11 11/03/2017    AST 27 11/03/2017    ALKPHOS 122 11/03/2017       Lab Results   Component Value Date    ALBUMIN 2.8 11/03/2017    PROTTOTAL 5.4 11/03/2017        Lab Results   Component Value Date    WBC 5.4 11/08/2017    HGB 8.9 11/08/2017    MCV 86 11/08/2017     11/08/2017       Lab Results   Component Value Date    INR 1.73 11/08/2017       Radiology  CT A/P with IV contrast 10/30/17 reviewed- PVT, ascites, varices    Assessment  52-year-old male with history of decompensated alcoholic cirrhosis complicated with ascites who presents for hospital follow-up after recent admission with variceal bleeding and SBP secondary to E. coli.  MELD-Na= 15.  Last ETOH= 10/2017.  Will complete 2-week course of IV antibiotics for e. coli bacteremia secondary to SBP.  Will then start prophylactic antibiotics for SBP.  Will increase diuretic doses to optimize control of ascites.  Will start non-selective beta blocker for secondary prophylaxis " of variceal bleeding.  TIPS placement during hospital admission would have been appropriate.  The patient will undergo a follow-up EGD next month.  Will not anticoagulate for relatively new onset portal vein thrombosis due to recent GI bleeding.  Up-to-date with HCC screening.     We discussed the importance of abstinence from alcohol in liver disease, complications of cirrhosis, management of ascites and indications for liver transplantation.    Plan  1.  Complete abstinence from alcohol  2.  Complete IV antibiotics as scheduled  3.  Start ciprofloxacin 500mg PO Q24 after completion of IV antibiotics  4.  Start carvedilol 6.25mg PO Q24  5.  EGD in Dec 2017  6.  Increase furosemide to 40mg per day  7.  Increase spironolactone to 100mg per day  8.  Low salt diet  9.  Nutritionist appointment 11/28/17  10.  Follow-up in 3 months    Gonzalo Bales MD  Hepatology  [facility]    I spent a total of 40 minutes face-to-face with Ivan Bell during today's office visit.  Over 50% of this time was spent counseling the patient and/or coordinating care regarding the importance of abstinence from alcohol in liver disease, complications of cirrhosis, management of ascites and indications for liver transplantation.  See note for details.

## 2017-11-08 NOTE — TELEPHONE ENCOUNTER
"ED / Discharge Outreach Protocol    Patient Contact    Attempt # 1    Was call answered?  No.  Unable to leave message. \"wireless customer you are calling is not available, please try your call again later. \"  Edie RIOS      "

## 2017-11-08 NOTE — PATIENT INSTRUCTIONS
1.  Complete IV antibiotics as scheduled  2.  Start ciprofloxacin as instructed  3.  Start carvedilol 6.25mg PO Q24 to reduce the pressures in your liver  4.  Upper endoscopy with me in Dec  5.  Increase furosemide to 40mg per day  6.  Increase spironolactone to 100mg per day  7.  Low salt diet  8.  Nutritionist appointment 11/28/17 as scheduled  9.  See me in the office in 3 months    Gonzalo Bales MD  Hepatology  [facility]

## 2017-11-08 NOTE — MR AVS SNAPSHOT
After Visit Summary   11/8/2017    Ivan Bell    MRN: [id number]           Patient Information     Date Of Birth          1965        Visit Information        Provider Department      11/8/2017 10:00 AM Gonzalo Wahl MD [facility] Hepatology        Today's Diagnoses     Secondary esophageal varices without bleeding (H)    -  1    Alcoholic cirrhosis of liver with ascites (H)          Care Instructions    1.  Complete IV antibiotics as scheduled  2.  Start ciprofloxacin as instructed  3.  Start carvedilol 6.25mg PO Q24 to reduce the pressures in your liver  4.  Upper endoscopy with me in Dec  5.  Increase furosemide to 40mg per day  6.  Increase spironolactone to 100mg per day  7.  Low salt diet  8.  Nutritionist appointment 11/28/17 as scheduled  9.  See me in the office in 3 months    Gonzalo Bales MD  Hepatology  [facility]          Follow-ups after your visit        Follow-up notes from your care team     Return in about 3 months (around 2/8/2018).      Your next 10 appointments already scheduled     Nov 28, 2017  8:00 AM CST   NUTRITION VISIT with Diana Heaton RD   [facility] Solid Organ Transplant ([facility])    [address] [place] [address]  3rd [facility] [id number] [phone]            Dec 08, 2017   Procedure with Gonzalo Miramontes MD   [facility] [place], Endoscopy ([facility])    [address] [facility] [address] [phone]           The [facility] is located on the corner of [address] and [address] on the [facility]. It is easily accessible from virtually any point in the [facility] area, via [address] and [address].            Feb 07, 2018  7:00 AM CST   Lab with  LAB    Health Lab ([facility])    [address] [id number] [phone]            Feb 07, 2018  8:00 AM  "CST   (Arrive by 7:45 AM)   Return General Liver with Gonzalo Miramontes MD   [facility] Hepatology ([facility])    [address] [facility]  3rd [facility] [address] [phone]              Who to contact     If you have questions or need follow up information about today's clinic visit or your schedule please contact [facility] HEPATOLOGY directly at [phone].  Normal or non-critical lab and imaging results will be communicated to you by [employer], letter or phone within 4 business days after the clinic has received the results. If you do not hear from us within 7 days, please contact the clinic through [employer] or phone. If you have a critical or abnormal lab result, we will notify you by phone as soon as possible.  Submit refill requests through [employer] or call your pharmacy and they will forward the refill request to us. Please allow 3 business days for your refill to be completed.          Additional Information About Your Visit        [employer] Information     [employer] gives you secure access to your electronic health record. If you see a primary care provider, you can also send messages to your care team and make appointments. If you have questions, please call your primary care clinic.  If you do not have a primary care provider, please call [phone] and they will assist you.        Care EveryWhere ID     This is your Care EveryWhere ID. This could be used by other organizations to access your [place] medical records  [id number]        Your Vitals Were     Pulse Temperature Respirations Height Pulse Oximetry BMI (Body Mass Index)    99 98.2  F (36.8  C) (Oral) 16 1.778 m (5' 10\") 100% 24.06 kg/m2       Blood Pressure from Last 3 Encounters:   11/08/17 133/86   11/06/17 118/78   11/03/17 111/63    Weight from Last 3 Encounters:   11/08/17 76.1 kg (167 lb 11.2 oz)   11/06/17 71.7 kg (158 lb 1.1 oz)   11/03/17 72.1 kg (158 lb 14.4 oz)              Today, you had the " following     No orders found for display         Today's Medication Changes          These changes are accurate as of: 11/8/17 10:52 AM.  If you have any questions, ask your nurse or doctor.               Start taking these medicines.        Dose/Directions    carvedilol 6.25 MG tablet   Commonly known as:  COREG   Used for:  Secondary esophageal varices without bleeding (H), Alcoholic cirrhosis of liver with ascites (H)   Started by:  Gonzalo Wahl MD        Dose:  6.25 mg   Take 1 tablet (6.25 mg) by mouth daily   Quantity:  60 tablet   Refills:  1         These medicines have changed or have updated prescriptions.        Dose/Directions    furosemide 20 MG tablet   Commonly known as:  LASIX   This may have changed:  how much to take   Used for:  Alcoholic cirrhosis of liver with ascites (H)   Changed by:  Gonzalo Wahl MD        Dose:  40 mg   Take 2 tablets (40 mg) by mouth daily   Quantity:  30 tablet   Refills:  0       spironolactone 100 MG tablet   Commonly known as:  ALDACTONE   This may have changed:  how much to take   Used for:  Alcoholic cirrhosis of liver with ascites (H)   Changed by:  Gonzalo Wahl MD        Dose:  100 mg   Take 1 tablet (100 mg) by mouth daily   Quantity:  30 tablet   Refills:  0            Where to get your medicines      These medications were sent to [facility] [address]  [address] [facility] [address]     Phone:  [phone]     carvedilol 6.25 MG tablet         Some of these will need a paper prescription and others can be bought over the counter.  Ask your nurse if you have questions.     You don't need a prescription for these medications     furosemide 20 MG tablet    spironolactone 100 MG tablet                Primary Care Provider Office Phone # Fax #    Rosette Wills [phone] [address] [facility] [address]        Equal Access to Services     KAITLYN  GAAR : Hadii aad ku jacki Myles, waaxda luqadaha, qaybta kaamarada adeolesya, jenny jenellein hayaayony short wendytova gaitan . So [facility] [phone].    ATENCIÓN: Si habla español, tiene a lee disposición servicios gratuitos de asistencia lingüística. Llame al [phone].    We comply with applicable federal civil rights laws and [place] laws. We do not discriminate on the basis of race, color, national origin, age, disability, sex, sexual orientation, or gender identity.            Thank you!     Thank you for choosing [facility] HEPATOLOGY  for your care. Our goal is always to provide you with excellent care. Hearing back from our patients is one way we can continue to improve our services. Please take a few minutes to complete the written survey that you may receive in the mail after your visit with us. Thank you!             Your Updated Medication List - Protect others around you: Learn how to safely use, store and throw away your medicines at www.disposemymeds.org.          This list is accurate as of: 11/8/17 10:52 AM.  Always use your most recent med list.                   Brand Name Dispense Instructions for use Diagnosis    carvedilol 6.25 MG tablet    COREG    60 tablet    Take 1 tablet (6.25 mg) by mouth daily    Secondary esophageal varices without bleeding (H), Alcoholic cirrhosis of liver with ascites (H)       cefTRIAXone 2 GM vial    ROCEPHIN    10 each    Inject 2 g into the vein every 24 hours    Bacteremia       ciprofloxacin 500 MG tablet    CIPRO    30 tablet    Take 1 tablet (500 mg) by mouth daily Start taking after you complete course of IV ceftriaxone.    SBP (spontaneous bacterial peritonitis) (H)       furosemide 20 MG tablet    LASIX    30 tablet    Take 2 tablets (40 mg) by mouth daily    Alcoholic cirrhosis of liver with ascites (H)       lactulose 10 GM/15ML solution    CHRONULAC     Take 15 g by mouth 2 times daily        MULTIPLE VITAMINS PO      Take 1 tablet by mouth daily         pantoprazole 40 MG EC tablet    PROTONIX    90 tablet    Take 1 tablet (40 mg) by mouth daily    Secondary esophageal varices with bleeding (H)       SILDENAFIL CITRATE PO      Take 10 mg by mouth daily        spironolactone 100 MG tablet    ALDACTONE    30 tablet    Take 1 tablet (100 mg) by mouth daily    Alcoholic cirrhosis of liver with ascites (H)       TYLENOL PO      Take by mouth every 4 hours as needed for mild pain or fever        VITAMIN B6 PO      Take by mouth daily

## 2017-11-08 NOTE — LETTER
11/8/2017       RE: Ivan Bell  [id number] [address] [place] [address]     Dear Colleague,    Thank you for referring your patient, Ivan Bell, to the [facility] HEPATOLOGY at [facility]. Please see a copy of my visit note below.    [facility]    Hepatology follow-up    Follow-up visit for cirrhosis    Subjective:  52 year old male    Cirrhosis  - ETOH, last ETOH 2/14/16  - hx ascites, HE  - no hx variceal bleed  - last EGD March 2016- small EV, mod portal hypertensive gastropathy  - HCC screening- CT A/P with IV contrast Oct 2017    Patient comes to clinic this morning with his wife and daughter for follow-up of cirrhosis and recent hospital admission.  Last clinic visit 04/2017.    The patient was discharged from hospital last week following a variceal bleed complicated with e.coli bacteremia secondary to SBP.  Patient initially presented to the hospital with hematochezia.  He underwent EGD 10/31/2017 which showed a large esophageal varices which were banded.  He also underwent flexible sigmoidoscopy which may have shown rectal varices.  The patient was discharged to home on IV antibiotics.  He was not started on a non-selective beta blocker.  TIPS was considered as the patient was technically Child class C but the decision was deferred to this clinic visit.      Of note, the patient presented to ER with new onset ascites the day before admission to hospital with bleeding.  A new portal vein thrombosis was seen on CT with IV contrast.  It was decided not to anticoagulate the patient in context of his GI bleed.     The patient is feeling better compared to admission.  He has ongoing ascites.  He is taking low-dose furosemide and spironolactone.  He is not adherent to a low-salt diet: follow-up with a nutritionist has been scheduled for later this month.      The patient denies any melena, hematemesis or hematochezia.      He denies jaundice,  lower extremity edema or confusion.      No history of fevers, sweats or chills.  Weight has increased since last clinic visit related to ascites.      The patient last drank alcohol 6 weeks ago.  He had a beer with his brother-in-law around that time.       Medical hx Surgical hx   Past Medical History:   Diagnosis Date     Ascites      Cirrhosis (H)      Esophageal varices (H)      Hepatitis      Hypertension      Left calcaneus fracture 1/9/2006     Portal vein thrombosis       Past Surgical History:   Procedure Laterality Date     ANKLE SURGERY Left      COLONOSCOPY N/A 3/31/2016    Procedure: COLONOSCOPY;  Surgeon: Rhys Uriostegui MD;  [facility]     ESOPHAGOSCOPY, GASTROSCOPY, DUODENOSCOPY (EGD), COMBINED N/A 3/31/2016    Procedure: COMBINED ESOPHAGOSCOPY, GASTROSCOPY, DUODENOSCOPY (EGD);  Surgeon: Rhys Uriostegui MD;  [facility]     KNEE SURGERY Left      KNEE SURGERY Right      SIGMOIDOSCOPY FLEXIBLE N/A 10/31/2017    Procedure: SIGMOIDOSCOPY FLEXIBLE;;  Surgeon: Armaan Adams MD;  [facility]          Medications  Prior to Admission medications    Medication Sig Start Date End Date Taking? Authorizing Provider   cefTRIAXone (ROCEPHIN) 2 GM vial Inject 2 g into the vein every 24 hours 11/3/17   Precious Luke MD   furosemide (LASIX) 20 MG tablet Take 1 tablet (20 mg) by mouth daily 11/3/17   Precious Luke MD   spironolactone (ALDACTONE) 100 MG tablet Take 0.5 tablets (50 mg) by mouth daily 11/3/17   Precious Luke MD   ciprofloxacin (CIPRO) 500 MG tablet Take 1 tablet (500 mg) by mouth daily Start taking after you complete course of IV ceftriaxone. 11/3/17   Precious Luke MD   pantoprazole (PROTONIX) 40 MG EC tablet Take 1 tablet (40 mg) by mouth daily 11/3/17   Precious Luke MD   SILDENAFIL CITRATE PO Take 10 mg by mouth daily    Unknown, Entered By History   Pyridoxine HCl (VITAMIN B6 PO) Take by mouth daily    Unknown, Entered By  "History   lactulose (CHRONULAC) 10 GM/15ML solution Take 15 g by mouth 2 times daily     Reported, Patient   Acetaminophen (TYLENOL PO) Take by mouth every 4 hours as needed for mild pain or fever    Reported, Patient   MULTIPLE VITAMINS PO Take 1 tablet by mouth daily    Reported, Patient       Allergies  Allergies   Allergen Reactions     Benadryl [Diphenhydramine] Other (See Comments)     Delirium (visual and auditory hallucinations)       Review of systems  A 10-point review of systems was negative    Examination  /86  Pulse 99  Temp 98.2  F (36.8  C) (Oral)  Resp 16  Ht 1.778 m (5' 10\")  Wt 76.1 kg (167 lb 11.2 oz)  SpO2 100%  BMI 24.06 kg/m2  Body mass index is 24.06 kg/(m^2).    Gen- well, NAD, A+Ox3, normal color  CVS- RRR  RS- reduced air entry and dull to percuss L base, no wheeze or crackles  Abd- distended, soft, non-tender, dull to percuss, no obvious organomegaly, BS+  Extr- hands normal, no BESSY  Skin- no rash or jaundice  Neuro- no asterixis  Psych- normal mood    Laboratory  Lab Results   Component Value Date     11/03/2017    POTASSIUM 4.0 11/03/2017    CHLORIDE 111 11/03/2017    CO2 18 11/03/2017    BUN 10 11/03/2017    CR 0.87 11/03/2017       Lab Results   Component Value Date    BILITOTAL 2.3 11/03/2017    ALT 11 11/03/2017    AST 27 11/03/2017    ALKPHOS 122 11/03/2017       Lab Results   Component Value Date    ALBUMIN 2.8 11/03/2017    PROTTOTAL 5.4 11/03/2017        Lab Results   Component Value Date    WBC 5.4 11/08/2017    HGB 8.9 11/08/2017    MCV 86 11/08/2017     11/08/2017       Lab Results   Component Value Date    INR 1.73 11/08/2017       Radiology  CT A/P with IV contrast 10/30/17 reviewed- PVT, ascites, varices    Assessment  52-year-old male with history of decompensated alcoholic cirrhosis complicated with ascites who presents for hospital follow-up after recent admission with variceal bleeding and SBP secondary to E. coli.  MELD-Na= 15.  Last ETOH= " 10/2017.  Will complete 2-week course of IV antibiotics for e. coli bacteremia secondary to SBP.  Will then start prophylactic antibiotics for SBP.  Will increase diuretic doses to optimize control of ascites.  Will start non-selective beta blocker for secondary prophylaxis of variceal bleeding.  TIPS placement during hospital admission would have been appropriate.  The patient will undergo a follow-up EGD next month.  Will not anticoagulate for relatively new onset portal vein thrombosis due to recent GI bleeding.  Up-to-date with HCC screening.     We discussed the importance of abstinence from alcohol in liver disease, complications of cirrhosis, management of ascites and indications for liver transplantation.    Plan  1.  Complete abstinence from alcohol  2.  Complete IV antibiotics as scheduled  3.  Start ciprofloxacin 500mg PO Q24 after completion of IV antibiotics  4.  Start carvedilol 6.25mg PO Q24  5.  EGD in Dec 2017  6.  Increase furosemide to 40mg per day  7.  Increase spironolactone to 100mg per day  8.  Low salt diet  9.  Nutritionist appointment 11/28/17  10.  Follow-up in 3 months    Gonzalo Bales MD  Hepatology  [facility]    I spent a total of 40 minutes face-to-face with Ivan Bell during today's office visit.  Over 50% of this time was spent counseling the patient and/or coordinating care regarding the importance of abstinence from alcohol in liver disease, complications of cirrhosis, management of ascites and indications for liver transplantation.  See note for details.      Again, thank you for allowing me to participate in the care of your patient.      Sincerely,    Gonzalo Bales MD

## 2017-11-08 NOTE — NURSING NOTE
"No chief complaint on file.      Initial /86  Pulse 99  Temp 98.2  F (36.8  C) (Oral)  Resp 16  Ht 1.778 m (5' 10\")  Wt 76.1 kg (167 lb 11.2 oz)  SpO2 100%  BMI 24.06 kg/m2 Estimated body mass index is 24.06 kg/(m^2) as calculated from the following:    Height as of this encounter: 1.778 m (5' 10\").    Weight as of this encounter: 76.1 kg (167 lb 11.2 oz).  Medication Reconciliation: complete    "

## 2017-11-09 NOTE — TELEPHONE ENCOUNTER
ED / Discharge Outreach Protocol    Patient Contact    Attempt # 3    Was call answered?  No.  Unable to leave message.  I tried to call both of the numbers listed. Both of the numbers just rang. No answer.   Stephanie Huff RN

## 2017-11-10 ENCOUNTER — HOME INFUSION (PRE-WILLOW HOME INFUSION) (OUTPATIENT)
Dept: PHARMACY | Facility: CLINIC | Age: 52
End: 2017-11-10

## 2017-11-13 ENCOUNTER — ALLIED HEALTH/NURSE VISIT (OUTPATIENT)
Dept: FAMILY MEDICINE | Facility: CLINIC | Age: 52
End: 2017-11-13
Payer: COMMERCIAL

## 2017-11-13 ENCOUNTER — HOME INFUSION (PRE-WILLOW HOME INFUSION) (OUTPATIENT)
Dept: PHARMACY | Facility: CLINIC | Age: 52
End: 2017-11-13

## 2017-11-13 VITALS
HEIGHT: 70 IN | WEIGHT: 167 LBS | HEART RATE: 72 BPM | BODY MASS INDEX: 23.91 KG/M2 | SYSTOLIC BLOOD PRESSURE: 92 MMHG | DIASTOLIC BLOOD PRESSURE: 60 MMHG

## 2017-11-13 DIAGNOSIS — Z48.02 ENCOUNTER FOR REMOVAL OF SUTURES: Primary | ICD-10-CM

## 2017-11-13 DIAGNOSIS — K70.31 ALCOHOLIC CIRRHOSIS OF LIVER WITH ASCITES (H): ICD-10-CM

## 2017-11-13 PROCEDURE — 99024 POSTOP FOLLOW-UP VISIT: CPT | Performed by: FAMILY MEDICINE

## 2017-11-13 NOTE — PROGRESS NOTES
This is a recent snapshot of the patient's [facility] medical record.  For current drug dose and complete information and questions, call [phone] or In Basket pool, [facility] ([id number])  CSN Number:  [id number]

## 2017-11-13 NOTE — PROGRESS NOTES
SUBJECTIVE:   Ivan Bell is a 52 year old male who presents to clinic today for the following health issues:    - 2 sutures to remove from left abdomen. Area is somewhat red and does itch. There is no bleeding or oozing. Area is not warm to the touch. No fevers. Sutures were placed 11/6/2017. PMHx cirrhosis with ascites and paracentesis completed 10/30/2017.    Problem list and histories reviewed & adjusted, as indicated.  Additional history: as documented    Patient Active Problem List   Diagnosis     Hypertriglyceridemia     Gouty arthropathy     Erectile dysfunction     Injury, hand     CARDIOVASCULAR SCREENING; LDL GOAL LESS THAN 130     24 hour contact given to patient      Hypertension goal BP (blood pressure) < 140/90     Peroneal muscular atrophy     Hallucinations, visual     Alcoholic cirrhosis of liver with ascites (H)     Past Surgical History:   Procedure Laterality Date     ANKLE SURGERY Left      COLONOSCOPY N/A 3/31/2016    Procedure: COLONOSCOPY;  Surgeon: Rhys Uriostegui MD;  Location:  GI     ESOPHAGOSCOPY, GASTROSCOPY, DUODENOSCOPY (EGD), COMBINED N/A 3/31/2016    Procedure: COMBINED ESOPHAGOSCOPY, GASTROSCOPY, DUODENOSCOPY (EGD);  Surgeon: Rhys Uriostegui MD;  Location:  GI     KNEE SURGERY Left      KNEE SURGERY Right      SIGMOIDOSCOPY FLEXIBLE N/A 10/31/2017    Procedure: SIGMOIDOSCOPY FLEXIBLE;;  Surgeon: Armaan Adams MD;  Location:  GI       Social History   Substance Use Topics     Smoking status: Former Smoker     Types: Dip, chew, snus or snuff     Smokeless tobacco: Former User     Quit date: 10/24/2017      Comment: 1 tin per 10 days.     Alcohol use No      Comment: last etoh 2/14/16, did have Odouls ~8/2017     Family History   Problem Relation Age of Onset     Family History Negative Mother      Family History Negative Father      Hypertension Father      CEREBROVASCULAR DISEASE Father 87     Breast Cancer Maternal Grandmother      Rheumatoid  "Arthritis Daughter      Depression Daughter      Cancer - colorectal No family hx of      Prostate Cancer No family hx of      Liver Disease No family hx of              Reviewed and updated as needed this visit by clinical staffTobacco  Allergies  Meds  Med Hx  Surg Hx  Fam Hx  Soc Hx      Reviewed and updated as needed this visit by Provider         1. Encounter for removal of sutures    2. Alcoholic cirrhosis of liver with ascites (H)        PMH: Updated and/or reviewed in chart.    PSH: Updated and/or reviewed in chart.    Family History: Updated and/or reviewed in chart.     ROS:  NO fevers, pain, oozing.  No new issues.  See recent GI note.    OBJECTIVE:                                                    BP 92/60  Pulse 72  Ht 5' 10\" (1.778 m)  Wt 167 lb (75.8 kg)  BMI 23.96 kg/m2  ABDOMEN: protuberant.  Non-tender. Paracentesis site with a large clump of adehsive. Carefully removed and apears to have single, figure of 8 suture. Removed. Tolerated well. No erythema. No drainage.      Results for orders placed or performed in visit on 11/08/17   Hepatic panel   Result Value Ref Range    Bilirubin Direct 1.1 (H) 0.0 - 0.2 mg/dL    Bilirubin Total 2.0 (H) 0.2 - 1.3 mg/dL    Albumin 3.1 (L) 3.4 - 5.0 g/dL    Protein Total 6.8 6.8 - 8.8 g/dL    Alkaline Phosphatase 363 (H) 40 - 150 U/L    ALT 17 0 - 70 U/L    AST 40 0 - 45 U/L   Basic metabolic panel   Result Value Ref Range    Sodium 137 133 - 144 mmol/L    Potassium 3.7 3.4 - 5.3 mmol/L    Chloride 109 94 - 109 mmol/L    Carbon Dioxide 19 (L) 20 - 32 mmol/L    Anion Gap 9 3 - 14 mmol/L    Glucose 92 70 - 99 mg/dL    Urea Nitrogen 8 7 - 30 mg/dL    Creatinine 0.84 0.66 - 1.25 mg/dL    GFR Estimate >90 >60 mL/min/1.7m2    GFR Estimate If Black >90 >60 mL/min/1.7m2    Calcium 8.2 (L) 8.5 - 10.1 mg/dL   INR   Result Value Ref Range    INR 1.73 (H) 0.86 - 1.14   CBC with platelets   Result Value Ref Range    WBC 5.4 4.0 - 11.0 10e9/L    RBC Count 3.24 (L) " 4.4 - 5.9 10e12/L    Hemoglobin 8.9 (L) 13.3 - 17.7 g/dL    Hematocrit 27.9 (L) 40.0 - 53.0 %    MCV 86 78 - 100 fl    MCH 27.5 26.5 - 33.0 pg    MCHC 31.9 31.5 - 36.5 g/dL    RDW 18.6 (H) 10.0 - 15.0 %    Platelet Count 161 150 - 450 10e9/L      ASSESSMENT/PLAN:                                                        ICD-10-CM    1. Encounter for removal of sutures Z48.02    2. Alcoholic cirrhosis of liver with ascites (H) K70.31          There are no Patient Instructions on file for this visit.   No orders of the defined types were placed in this encounter.     BP Readings from Last 1 Encounters:   11/13/17 92/60      Wt Readings from Last 1 Encounters:   11/13/17 167 lb (75.8 kg)          See Patient Instructions    Jm Barrow MD

## 2017-11-13 NOTE — MR AVS SNAPSHOT
After Visit Summary   11/13/2017    Ivan Bell    MRN: [id number]           Patient Information     Date Of Birth          1965        Visit Information        Provider Department      11/13/2017 4:00 PM Jm Barrow MD [facility]        Today's Diagnoses     Encounter for removal of sutures    -  1    Alcoholic cirrhosis of liver with ascites (H)           Follow-ups after your visit        Your next 10 appointments already scheduled     Nov 28, 2017  8:00 AM CST   NUTRITION VISIT with Diana Heaton RD   [facility] Solid Organ Transplant ([facility])    [address] [place] [address]  3rd [facility] [id number] [phone]            Dec 08, 2017   Procedure with Gonzalo Miramontes MD   [facility] [place], Endoscopy ([facility], [facility])    [address] [id number] [phone]           The [facility] is located on the corner of [facility] and [facility] on the [facility]. It is easily accessible from virtually any point in the [facility] area, via [address] and [address].            Feb 07, 2018  7:00 AM CST   Lab with  LAB   [facility] Lab ([facility])    [address] [place] [id number] [phone]            Feb 07, 2018  8:00 AM CST   (Arrive by 7:45 AM)   Return General Liver with Gonzalo Miramontes MD   [facility] Hepatology ([facility])    [address] [place] [id number] [phone]              Who to contact     Normal or non-critical lab and imaging results will be communicated to you by MyChart, letter or phone within 4 business days after the clinic has received the results. If you do not hear from us within 7 days, please contact the clinic through MyChart or phone. If you have a critical or abnormal lab result, we will notify  "you by phone as soon as possible.  Submit refill requests through [employer] or call your pharmacy and they will forward the refill request to us. Please allow 3 business days for your refill to be completed.          If you need to speak with a  for additional information , please call: [phone]           Additional Information About Your Visit        [employer] Information     [employer] gives you secure access to your electronic health record. If you see a primary care provider, you can also send messages to your care team and make appointments. If you have questions, please call your primary care clinic.  If you do not have a primary care provider, please call [phone] and they will assist you.        Care EveryWhere ID     This is your Care EveryWhere ID. This could be used by other organizations to access your [place] medical records  [id number]        Your Vitals Were     Pulse Height BMI (Body Mass Index)             72 5' 10\" (1.778 m) 23.96 kg/m2          Blood Pressure from Last 3 Encounters:   11/13/17 92/60   11/08/17 133/86   11/06/17 118/78    Weight from Last 3 Encounters:   11/13/17 167 lb (75.8 kg)   11/08/17 167 lb 11.2 oz (76.1 kg)   11/06/17 158 lb 1.1 oz (71.7 kg)              Today, you had the following     No orders found for display       Primary Care Provider Office Phone # Fax #    Rosette Wills [phone] [address] [place] [facility] [address]        Equal Access to Services     [facility] CenterMEKH[facility] : Hadii aad ku hadasho Soomaali, waaxda luqadaha, qaybta kaalmada adeegyada, waxay jenellein hayberenicen han molina. So [facility] [phone].    ATENCIÓN: Si habla español, tiene a lee disposición servicios gratuitos de asistencia lingüística. Llame al [phone].    We comply with applicable federal civil rights laws and [place] laws. We do not discriminate on the basis of race, color, national origin, age, disability, sex, sexual orientation, or " gender identity.            Thank you!     Thank you for choosing [facility]  for your care. Our goal is always to provide you with excellent care. Hearing back from our patients is one way we can continue to improve our services. Please take a few minutes to complete the written survey that you may receive in the mail after your visit with us. Thank you!             Your Updated Medication List - Protect others around you: Learn how to safely use, store and throw away your medicines at www.disposemymeds.org.          This list is accurate as of: 11/13/17 11:59 PM.  Always use your most recent med list.                   Brand Name Dispense Instructions for use Diagnosis    carvedilol 6.25 MG tablet    COREG    60 tablet    Take 1 tablet (6.25 mg) by mouth daily    Secondary esophageal varices without bleeding (H), Alcoholic cirrhosis of liver with ascites (H)       cefTRIAXone 2 GM vial    ROCEPHIN    10 each    Inject 2 g into the vein every 24 hours    Bacteremia       ciprofloxacin 500 MG tablet    CIPRO    30 tablet    Take 1 tablet (500 mg) by mouth daily Start taking after you complete course of IV ceftriaxone.    SBP (spontaneous bacterial peritonitis) (H)       furosemide 20 MG tablet    LASIX    30 tablet    Take 2 tablets (40 mg) by mouth daily    Alcoholic cirrhosis of liver with ascites (H)       lactulose 10 GM/15ML solution    CHRONULAC     Take 15 g by mouth 2 times daily        MULTIPLE VITAMINS PO      Take 1 tablet by mouth daily        pantoprazole 40 MG EC tablet    PROTONIX    90 tablet    Take 1 tablet (40 mg) by mouth daily    Secondary esophageal varices with bleeding (H)       SILDENAFIL CITRATE PO      Take 10 mg by mouth daily        spironolactone 100 MG tablet    ALDACTONE    30 tablet    Take 1 tablet (100 mg) by mouth daily    Alcoholic cirrhosis of liver with ascites (H)       TYLENOL PO      Take by mouth every 4 hours as needed for mild pain or fever        VITAMIN  B6 PO      Take by mouth daily

## 2017-11-14 ENCOUNTER — HOME INFUSION (PRE-WILLOW HOME INFUSION) (OUTPATIENT)
Dept: PHARMACY | Facility: CLINIC | Age: 52
End: 2017-11-14

## 2017-11-14 ENCOUNTER — TELEPHONE (OUTPATIENT)
Dept: GASTROENTEROLOGY | Facility: CLINIC | Age: 52
End: 2017-11-14

## 2017-11-14 NOTE — TELEPHONE ENCOUNTER
Writer called patient's wife.  She had questions on whether he should be finished with his Cefetriaxone infusion.  Informed her that Dr. [facility] know that the last day of infusion is today 11/14/17 and PICC line removal afterwards.

## 2017-11-14 NOTE — PROGRESS NOTES
This is a recent snapshot of the patient's [facility] medical record.  For current drug dose and complete information and questions, call [phone] or In Basket pool, fv home infusion ([id number])  CSN Number:  [id number]

## 2017-11-16 NOTE — PROGRESS NOTES
This is a recent snapshot of the patient's [facility] medical record.  For current drug dose and complete information and questions, call [phone] or In Basket pool, fv home infusion ([id number])  CSN Number:  [id number]

## 2017-11-28 ENCOUNTER — ALLIED HEALTH/NURSE VISIT (OUTPATIENT)
Dept: TRANSPLANT | Facility: CLINIC | Age: 52
End: 2017-11-28
Payer: COMMERCIAL

## 2017-11-28 DIAGNOSIS — K70.31 ALCOHOLIC CIRRHOSIS OF LIVER WITH ASCITES (H): Primary | ICD-10-CM

## 2017-11-28 PROCEDURE — 97802 MEDICAL NUTRITION INDIV IN: CPT | Mod: ZF | Performed by: DIETITIAN, REGISTERED

## 2017-11-28 NOTE — MR AVS SNAPSHOT
After Visit Summary   11/28/2017    Ivan Bell    MRN: [id number]           Patient Information     Date Of Birth          1965        Visit Information        Provider Department      11/28/2017 8:00 AM Diana Heaton RD [facility] Solid Organ Transplant        Today's Diagnoses     Alcoholic cirrhosis of liver with ascites (H)    -  1       Follow-ups after your visit        Your next 10 appointments already scheduled     Dec 08, 2017   Procedure with Gonzalo Miramontes MD   [facility] [place], Endoscopy ([facility])    [address] [facility] [id number] [phone]           The [facility] is located on the corner of [facility] and [facility] on the [facility]. It is easily accessible from virtually any point in the [facility] area, via [address] and [address].            Feb 07, 2018  7:00 AM CST   Lab with  LAB   [facility] Lab ([facility])    [address] [facility]  1st [facility] [address] [phone]            Feb 07, 2018  8:00 AM CST   (Arrive by 7:45 AM)   Return General Liver with Gonzalo Miramontes MD   [facility] Hepatology ([facility])    [address] [place] [address]  3rd [facility] [address] [phone]              Who to contact     If you have questions or need follow up information about today's clinic visit or your schedule please contact [facility] directly at [phone].  Normal or non-critical lab and imaging results will be communicated to you by MyChart, letter or phone within 4 business days after the clinic has received the results. If you do not hear from us within 7 days, please contact the clinic through MyChart or phone. If you have a critical or abnormal lab result, we will notify you by phone as soon as possible.  Submit refill requests through [employer] or call your pharmacy  and they will forward the refill request to us. Please allow 3 business days for your refill to be completed.          Additional Information About Your Visit        "[employer] Information     [employer] gives you secure access to your electronic health record. If you see a primary care provider, you can also send messages to your care team and make appointments. If you have questions, please call your primary care clinic.  If you do not have a primary care provider, please call [phone] and they will assist you.        Care EveryWhere ID     This is your Care EveryWhere ID. This could be used by other organizations to access your [place] medical records  [id number]         Blood Pressure from Last 3 Encounters:   11/13/17 92/60   11/08/17 133/86   11/06/17 118/78    Weight from Last 3 Encounters:   11/13/17 75.8 kg (167 lb)   11/08/17 76.1 kg (167 lb 11.2 oz)   11/06/17 71.7 kg (158 lb 1.1 oz)              We Performed the Following     MNT INDIVIDUAL INITIAL EA 15 MIN        Primary Care Provider Office Phone # Fax #    Rosette Wills [phone] [address] [place] [address]        Equal Access to Services     MYKE CAMACHO AH: Hadii aad pat hadtooo Sojimyali, waaxda luqadaha, qaybta kaalmada adeegyada, jenny abad labird molina. So [facility] [phone].    ATENCIÓN: Si habla español, tiene a lee disposición servicios gratuitos de asistencia lingüística. Llame al [phone].    We comply with applicable federal civil rights laws and [place] laws. We do not discriminate on the basis of race, color, national origin, age, disability, sex, sexual orientation, or gender identity.            Thank you!     Thank you for choosing [facility]  for your care. Our goal is always to provide you with excellent care. Hearing back from our patients is one way we can continue to improve our services. Please take a few minutes to complete the written survey that  you may receive in the mail after your visit with us. Thank you!             Your Updated Medication List - Protect others around you: Learn how to safely use, store and throw away your medicines at www.disposemymeds.org.          This list is accurate as of: 11/28/17  9:14 AM.  Always use your most recent med list.                   Brand Name Dispense Instructions for use Diagnosis    carvedilol 6.25 MG tablet    COREG    60 tablet    Take 1 tablet (6.25 mg) by mouth daily    Secondary esophageal varices without bleeding (H), Alcoholic cirrhosis of liver with ascites (H)       cefTRIAXone 2 GM vial    ROCEPHIN    10 each    Inject 2 g into the vein every 24 hours    Bacteremia       ciprofloxacin 500 MG tablet    CIPRO    30 tablet    Take 1 tablet (500 mg) by mouth daily Start taking after you complete course of IV ceftriaxone.    SBP (spontaneous bacterial peritonitis) (H)       furosemide 20 MG tablet    LASIX    30 tablet    Take 2 tablets (40 mg) by mouth daily    Alcoholic cirrhosis of liver with ascites (H)       lactulose 10 GM/15ML solution    CHRONULAC     Take 15 g by mouth 2 times daily        MULTIPLE VITAMINS PO      Take 1 tablet by mouth daily        pantoprazole 40 MG EC tablet    PROTONIX    90 tablet    Take 1 tablet (40 mg) by mouth daily    Secondary esophageal varices with bleeding (H)       SILDENAFIL CITRATE PO      Take 10 mg by mouth daily        spironolactone 100 MG tablet    ALDACTONE    30 tablet    Take 1 tablet (100 mg) by mouth daily    Alcoholic cirrhosis of liver with ascites (H)       TYLENOL PO      Take by mouth every 4 hours as needed for mild pain or fever        VITAMIN B6 PO      Take by mouth daily

## 2017-11-28 NOTE — PROGRESS NOTES
Outpatient MNT     Time Spent: 30 minutes  Visit Type: Initial  Referring Physician: Dr Bales   Reason for RD Visit: recent weight loss, low Na+ diet ed   Pt accompanied by: his wife, Elicia    Nutrition Assessment  Appetite: varies   Pt's wife makes shake every other day, which pt drinks half of - Greek yogurt, protein powder, PB, banana, flaxseed, cinnamon, oats. Pt does have protein bars at home that he used to eat when working this summer, but not currently.     Vitamins, Supplements, Pertinent Meds: MVI  Herbal Medicines/Supplements: Cellucor P6 from [facility] for testosterone purposes per pt    Diet Recall  Breakfast Cereal, PB toast, 1 cinnamon roll, banana, fruit    Lunch Hot dish, toast with butter   Dinner Frozen burrito, RB float, cinnamon roll   Snacks Candy bars   Beverages Water, Root Beer, occasional tea, hot chocolate    Dining out 2x/month      Physical Activity  Working outside      Procedures with Nutritional Implications  N/A    Anthropometrics  Height:   70 in   BMI:    23.9    Weight Status:Normal BMI   Weight:  167 lbs (11/13)            IBW (lb): 166  % IBW: 100    Wt Hx: UBW 1 year ago when healthy of 150 lbs. Current weight up (likely fluid - although pt reports no edema/ascites), but severe muscle loss, most notable in face, legs, arms. Pt's wife showed me a picture of them >1 year ago, and pt's muscle wasting would be classified as severe.     Adj/dosing BW: 167 lbs/76 kg       Malnutrition  % Intake: difficult to assess d/t variable appetite   % Weight Loss: None noted  Subcutaneous Fat Loss: Mild  Muscle Loss: Severe  Fluid Accumulation/Edema: Unknown, yet pt appears to be retaining fluid   Malnutrition Diagnosis: Non-Severe malnutrition in the context of chronic illness    Estimated Nutrition Needs  Energy  1211-2973     (25-30 kcal/kg for maintenance)     Protein  75-    (1-1.2-1.5 g/kg for increased needs based on severe muscle wasting)         Fluid  1 ml/kcal or per MD     Nutrition  Diagnosis  Inadequate protein intake related to appetite for sweet foods and non high protein items as evidenced by severe muscle wasting noted in face, arms, and legs, pt/wife report of muscle loss, diet recall showing pt not meeting 75 g/day minimum of protein intake.     Nutrition Intervention  1- Discussed need for increased protein intake at a minimum of 75 g/day consistently in order to replete any lost muscle. Reviewed protein sources, encouraged him to drink his wife's protein shake daily, try protein bars that he has around, and think about how to add/increase protein at each meal. Provided protein shake ideas for variety via email. Encouraged tracking his protein intake if desired.     2- Reviewed following low Na+ diet <2000 mg/day. Encouraged him to read labels to educate self on sodium content of various foods. Pt reports he was not aware of amount for sodium restriction, but wife has heard this goal number before. Reviewed seasonings to use instead of salt, high sodium foods to avoid and low sodium foods to choose more often, and emailed recipes.     Patient Understanding: Pt verbalized understanding of education provided.  Expected Compliance: Good  Follow-Up Plans: PRN     Nutrition Goals  1. Na+ <2000 mg/day  2. Minimum of 75 g protein/day using 1 protein shake or supplement daily     Provided pt with contact info.   Diana Heaton, RD, LD

## 2017-12-04 ENCOUNTER — TELEPHONE (OUTPATIENT)
Dept: GASTROENTEROLOGY | Facility: CLINIC | Age: 52
End: 2017-12-04

## 2017-12-04 NOTE — TELEPHONE ENCOUNTER
Patient scheduled for EGD    Indication for procedure. Secondary esophageal varices without bleeding    Referring Provider. Josseline Naylor, GILLIAN CNP     ? Not Needed    Arrival time verified? Yes, 0820    Facility location verified? Yes, [address]    Instructions given regarding prep and procedure    Prep Type NPO    Are you taking any anticoagulants or blood thinners? No    Instructions given? N/A    Electronic implanted devices? No    Pre procedure teaching completed? Yes    Transportation from procedure? Wife    H&P / Pre op physical completed? N/A

## 2017-12-06 DIAGNOSIS — K70.31 ALCOHOLIC CIRRHOSIS OF LIVER WITH ASCITES (H): ICD-10-CM

## 2017-12-06 RX ORDER — FUROSEMIDE 20 MG
40 TABLET ORAL DAILY
Qty: 60 TABLET | Refills: 0 | Status: SHIPPED | OUTPATIENT
Start: 2017-12-06 | End: 2018-01-10

## 2017-12-06 RX ORDER — SPIRONOLACTONE 100 MG/1
100 TABLET, FILM COATED ORAL DAILY
Qty: 30 TABLET | Refills: 1 | Status: SHIPPED | OUTPATIENT
Start: 2017-12-06 | End: 2018-02-07

## 2017-12-06 NOTE — TELEPHONE ENCOUNTER
spironolactone      Last Written Prescription Date: 11-  Last Fill Quantity: 30,  # refills: 0   Last Office Visit with [facility], [facility] or [facility] prescribing provider: upcoming 12-

## 2017-12-08 ENCOUNTER — HOSPITAL ENCOUNTER (OUTPATIENT)
Facility: CLINIC | Age: 52
Discharge: HOME | End: 2017-12-08
Attending: INTERNAL MEDICINE | Admitting: INTERNAL MEDICINE
Payer: COMMERCIAL

## 2017-12-08 ENCOUNTER — SURGERY (OUTPATIENT)
Age: 52
End: 2017-12-08

## 2017-12-08 VITALS
HEART RATE: 70 BPM | DIASTOLIC BLOOD PRESSURE: 88 MMHG | RESPIRATION RATE: 16 BRPM | OXYGEN SATURATION: 62 % | SYSTOLIC BLOOD PRESSURE: 142 MMHG

## 2017-12-08 DIAGNOSIS — K70.31 ALCOHOLIC CIRRHOSIS OF LIVER WITH ASCITES (H): Primary | ICD-10-CM

## 2017-12-08 LAB — UPPER GI ENDOSCOPY: NORMAL

## 2017-12-08 PROCEDURE — 25000125 ZZHC RX 250: Performed by: INTERNAL MEDICINE

## 2017-12-08 PROCEDURE — 25000128 H RX IP 250 OP 636: Performed by: INTERNAL MEDICINE

## 2017-12-08 PROCEDURE — 43244 EGD VARICES LIGATION: CPT | Performed by: INTERNAL MEDICINE

## 2017-12-08 PROCEDURE — G0500 MOD SEDAT ENDO SERVICE >5YRS: HCPCS | Performed by: INTERNAL MEDICINE

## 2017-12-08 PROCEDURE — 99152 MOD SED SAME PHYS/QHP 5/>YRS: CPT | Performed by: INTERNAL MEDICINE

## 2017-12-08 RX ORDER — FENTANYL CITRATE 50 UG/ML
INJECTION, SOLUTION INTRAMUSCULAR; INTRAVENOUS PRN
Status: DISCONTINUED | OUTPATIENT
Start: 2017-12-08 | End: 2017-12-08 | Stop reason: HOSPADM

## 2017-12-08 RX ADMIN — MIDAZOLAM 2 MG: 1 INJECTION INTRAMUSCULAR; INTRAVENOUS at 09:35

## 2017-12-08 RX ADMIN — MIDAZOLAM 1 MG: 1 INJECTION INTRAMUSCULAR; INTRAVENOUS at 09:38

## 2017-12-08 RX ADMIN — MIDAZOLAM 1 MG: 1 INJECTION INTRAMUSCULAR; INTRAVENOUS at 09:45

## 2017-12-08 RX ADMIN — BENZOCAINE 1 SPRAY: 220 SPRAY, METERED PERIODONTAL at 09:33

## 2017-12-08 RX ADMIN — FENTANYL CITRATE 100 MCG: 50 INJECTION, SOLUTION INTRAMUSCULAR; INTRAVENOUS at 09:34

## 2017-12-08 RX ADMIN — FENTANYL CITRATE 50 MCG: 50 INJECTION, SOLUTION INTRAMUSCULAR; INTRAVENOUS at 09:45

## 2017-12-08 RX ADMIN — FENTANYL CITRATE 50 MCG: 50 INJECTION, SOLUTION INTRAMUSCULAR; INTRAVENOUS at 09:38

## 2017-12-08 NOTE — OR NURSING
Procedure: EGD with banding x3  Sedation: Conscious sedation  O2: 2-4 LPM NC, room air post  Tolerated: Well. VS stable during and post procedure. Not c/o abdominal or chest pain  Report: Given to recovery RN  Pt to recovery area in stable condition, accompanied by RN      Sue French, RN

## 2017-12-13 DIAGNOSIS — K70.31 ALCOHOLIC CIRRHOSIS OF LIVER WITH ASCITES (H): Primary | ICD-10-CM

## 2017-12-16 ENCOUNTER — RADIANT APPOINTMENT (OUTPATIENT)
Dept: ULTRASOUND IMAGING | Facility: CLINIC | Age: 52
End: 2017-12-16
Attending: INTERNAL MEDICINE
Payer: COMMERCIAL

## 2017-12-16 DIAGNOSIS — K70.31 ALCOHOLIC CIRRHOSIS OF LIVER WITH ASCITES (H): ICD-10-CM

## 2017-12-18 ENCOUNTER — TELEPHONE (OUTPATIENT)
Dept: GASTROENTEROLOGY | Facility: CLINIC | Age: 52
End: 2017-12-18

## 2017-12-18 DIAGNOSIS — R18.8 ASCITES: Primary | ICD-10-CM

## 2017-12-18 RX ORDER — ALBUMIN (HUMAN) 12.5 G/50ML
12.5 SOLUTION INTRAVENOUS 4 TIMES DAILY PRN
Status: CANCELLED
Start: 2017-12-18

## 2017-12-18 NOTE — TELEPHONE ENCOUNTER
Left vm message for patient to call back to confirm dosage of diuretics he is taking.      Wife called and confirmed that patient is taking his furosemide and spironolactone as directed, which is 40 mg daily furosemide and spironolactone 100 mg one tablet daily.

## 2017-12-18 NOTE — TELEPHONE ENCOUNTER
Writer called patient to let him know that order was put in for paracentesis.  Writer told patient he could called [facility] to see when he could get in to get paracentesis done. He would like to get done tomorrow if he could.

## 2017-12-19 ENCOUNTER — HOSPITAL ENCOUNTER (OUTPATIENT)
Dept: ULTRASOUND IMAGING | Facility: CLINIC | Age: 52
Discharge: HOME | End: 2017-12-19
Attending: INTERNAL MEDICINE | Admitting: INTERNAL MEDICINE
Payer: COMMERCIAL

## 2017-12-19 VITALS — RESPIRATION RATE: 16 BRPM | SYSTOLIC BLOOD PRESSURE: 124 MMHG | HEART RATE: 67 BPM | DIASTOLIC BLOOD PRESSURE: 74 MMHG

## 2017-12-19 DIAGNOSIS — K70.31 ALCOHOLIC CIRRHOSIS OF LIVER WITH ASCITES (H): ICD-10-CM

## 2017-12-19 LAB
INR PPP: 1.5 (ref 0.86–1.14)
PLATELET # BLD AUTO: 92 10E9/L (ref 150–450)

## 2017-12-19 PROCEDURE — 25000128 H RX IP 250 OP 636: Performed by: INTERNAL MEDICINE

## 2017-12-19 PROCEDURE — P9047 ALBUMIN (HUMAN), 25%, 50ML: HCPCS | Performed by: INTERNAL MEDICINE

## 2017-12-19 PROCEDURE — 27210190 US PARACENTESIS

## 2017-12-19 PROCEDURE — 85049 AUTOMATED PLATELET COUNT: CPT | Performed by: RADIOLOGY

## 2017-12-19 PROCEDURE — 85610 PROTHROMBIN TIME: CPT | Performed by: RADIOLOGY

## 2017-12-19 RX ORDER — ALBUMIN (HUMAN) 12.5 G/50ML
12.5 SOLUTION INTRAVENOUS ONCE
Status: COMPLETED | OUTPATIENT
Start: 2017-12-19 | End: 2017-12-19

## 2017-12-19 RX ORDER — LIDOCAINE 40 MG/G
CREAM TOPICAL
Status: DISCONTINUED | OUTPATIENT
Start: 2017-12-19 | End: 2017-12-20 | Stop reason: HOSPADM

## 2017-12-19 RX ADMIN — ALBUMIN HUMAN 50 G: 0.25 SOLUTION INTRAVENOUS at 10:23

## 2017-12-19 NOTE — PROGRESS NOTES
RLQ paracentesis performed by radiologist.  7100   Ml clear yellow fluid removed. Pressure held at site after catheter removed. No bleeding noted.  Skin glue used at site.

## 2017-12-19 NOTE — IP AVS SNAPSHOT
MRN:[id number]                      After Visit Summary   12/19/2017    Ivan Bell    MRN: [id number]           Visit Information        Provider Department      12/19/2017 10:00 AM [place] RAD; [place] IMAGING NURSE; [facility]           Review of your medicines      UNREVIEWED medicines. Ask your doctor about these medicines        Dose / Directions    carvedilol 6.25 MG tablet   Commonly known as:  COREG   Used for:  Secondary esophageal varices without bleeding (H), Alcoholic cirrhosis of liver with ascites (H)        Dose:  6.25 mg   Take 1 tablet (6.25 mg) by mouth daily   Quantity:  60 tablet   Refills:  1       ciprofloxacin 500 MG tablet   Commonly known as:  CIPRO   Used for:  SBP (spontaneous bacterial peritonitis) (H)        Dose:  500 mg   Take 1 tablet (500 mg) by mouth daily Start taking after you complete course of IV ceftriaxone.   Quantity:  30 tablet   Refills:  0       furosemide 20 MG tablet   Commonly known as:  LASIX   Used for:  Alcoholic cirrhosis of liver with ascites (H)        Dose:  40 mg   Take 2 tablets (40 mg) by mouth daily   Quantity:  60 tablet   Refills:  0       lactulose 10 GM/15ML solution   Commonly known as:  CHRONULAC        Dose:  15 g   Take 15 g by mouth 2 times daily   Refills:  0       MULTIPLE VITAMINS PO        Dose:  1 tablet   Take 1 tablet by mouth daily   Refills:  0       pantoprazole 40 MG EC tablet   Commonly known as:  PROTONIX   Used for:  Secondary esophageal varices with bleeding (H)        Dose:  40 mg   Take 1 tablet (40 mg) by mouth daily   Quantity:  90 tablet   Refills:  0       SILDENAFIL CITRATE PO        Dose:  10 mg   Take 10 mg by mouth daily   Refills:  0       spironolactone 100 MG tablet   Commonly known as:  ALDACTONE   Used for:  Alcoholic cirrhosis of liver with ascites (H)        Dose:  100 mg   Take 1 tablet (100 mg) by mouth daily   Quantity:  30 tablet   Refills:  1       TYLENOL PO        Take by mouth  every 4 hours as needed for mild pain or fever   Refills:  0       VITAMIN B6 PO        Take by mouth daily   Refills:  0                Protect others around you: Learn how to safely use, store and throw away your medicines at www.disposemymeds.org.         Follow-ups after your visit        Your next 10 appointments already scheduled     Dec 19, 2017 10:00 AM CST   (Arrive by 9:45 AM)   US PARACENTESIS with YOSVANY MILLIGAN IMAGING NURSE, [place] RAD   [facility] ([facility])    [address] [address] [phone]           Tell us in advance if there s any chance you may be pregnant.  Bring a list of your medicines to the exam. Include vitamins, minerals and over-the-counter drugs.  If you take blood thinners, you may need to stop taking them a few days before treatment. Talk to your doctor before stopping these medicines. You will need a blood test the morning of your exam.   Stop taking Coumadin (warfarin) 3 days before your exam. Restart the day after your exam.   If you take aspirin, you may need to stop taking it 3 days before your scan.   If you take Plavix, Ticlid, Pletal or Persantine, you may need to stop taking them 5 days before your scan. Please talk to your doctor before stopping these medicines.  IF YOU WILL RECEIVE SEDATION (medicine to help you relax):   See your family doctor for an exam within 30 days of treatment.   Plan for an adult to drive you home and stay with you for at least 6 hours.   Follow the eating and drinking guidelines checked below:   No eating or drinking for 4 hours before your test. You may take medicine with small sips of water.   If you have diabetes:If you take insulin, call your diabetes care team. Do not take diabetes pills on the morning of your test. If you take metformin (Avandamet, Glucophage, Glucovance, Metaglip) and received contrast, wait 48 hours before re-starting this medicine.  Please call the Imaging Department at  your exam site with any questions.            Jan 19, 2018   Procedure with Gonzalo Miramontes MD   [facility] [place], Endoscopy ([facility])    [address]  [facility] [place] [address] [phone]           The [facility] is located on the corner of [address] and [address] on the [facility]. It is easily accessible from virtually any point in the [place] area, via [address] and [address].            Feb 07, 2018  7:00 AM CST   Lab with  LAB   [facility] Lab ([facility])    [address]  [address] [facility] [id number] [phone]            Feb 07, 2018  8:00 AM CST   (Arrive by 7:45 AM)   Return General Liver with Gonzalo Miramontes MD   [facility] Hepatology ([facility])    [address] [place] [id number] [phone]               Care Instructions        Further instructions from your care team                             Radiology  Discharge Instructions for Abdominal Paracentesis    You have had an abdominal paracentesis procedure today.  A needle or catheter was put into your abdomen to remove fluid for laboratory studies and/or to remove the extra fluid from your abdomen.    AFTER YOU ARE HOME:    Rest at home today.    Limit physical activity such as lifting, straining, or exercise for 48 hours.  You may resume normal activity in 24 hours.    Resume previous diet and medications.    You may remove bandage in 24 hours.    CALL YOUR PRIMARY PROVIDER IF:    You develop temperature over 101o F, or redness at the drainage site.    You have any other questions or concerns.    AFTER HOURS CALL [place] NURSE ADVISORS AT [phone]    COME TO EMERGENCY ROOM IF:    You develop severe abdominal pain, swelling the size of a baseball or larger, continuous oozing from puncture site longer than 24 hours, bleeding that saturates a  gauze dressing even after you have put direct pressure on the site for 15 minutes, severe lightheadedness or fainting.  DO NOT DRIVE YOURSELF.      Your ordering physician will contact you with the laboratory results.        ADDITIONAL INSTRUCTIONS:       I have reviewed and understand these instructions.      __________________________________________________  Ivan Gonsior      __________________________________________________     Nurse/Radiologist Signature      Date: 12/19/2017               Additional Information About Your Visit        [employer] Information     [employer] gives you secure access to your electronic health record. If you see a primary care provider, you can also send messages to your care team and make appointments. If you have questions, please call your primary care clinic.  If you do not have a primary care provider, please call [phone] and they will assist you.        Care EveryWhere ID     This is your Care EveryWhere ID. This could be used by other organizations to access your [place] medical records  [id number]         Primary Care Provider Office Phone # Fax #    Rosette Wills [phone]      Equal Access to Services     [facility]: Elie Myles, sarah guevara, qajenny amador . So [facility] [place] [facility] [phone].    ATENCIÓN: Si habla español, tiene a lee disposición servicios gratuitos de asistencia lingüística. Roberto al [phone].    We comply with applicable federal civil rights laws and [place] laws. We do not discriminate on the basis of race, color, national origin, age, disability, sex, sexual orientation, or gender identity.            Thank you!     Thank you for choosing [place] for your care. Our goal is always to provide you with excellent care. Hearing back from our patients is one way we can continue to improve our services. Please take a few minutes to complete the written survey  that you may receive in the mail after you visit with us. Thank you!             Medication List: This is a list of all your medications and when to take them. Check marks below indicate your daily home schedule. Keep this list as a reference.      Medications           Morning Afternoon Evening Bedtime As Needed    carvedilol 6.25 MG tablet   Commonly known as:  COREG   Take 1 tablet (6.25 mg) by mouth daily                                ciprofloxacin 500 MG tablet   Commonly known as:  CIPRO   Take 1 tablet (500 mg) by mouth daily Start taking after you complete course of IV ceftriaxone.                                furosemide 20 MG tablet   Commonly known as:  LASIX   Take 2 tablets (40 mg) by mouth daily                                lactulose 10 GM/15ML solution   Commonly known as:  CHRONULAC   Take 15 g by mouth 2 times daily                                MULTIPLE VITAMINS PO   Take 1 tablet by mouth daily                                pantoprazole 40 MG EC tablet   Commonly known as:  PROTONIX   Take 1 tablet (40 mg) by mouth daily                                SILDENAFIL CITRATE PO   Take 10 mg by mouth daily                                spironolactone 100 MG tablet   Commonly known as:  ALDACTONE   Take 1 tablet (100 mg) by mouth daily                                TYLENOL PO   Take by mouth every 4 hours as needed for mild pain or fever                                VITAMIN B6 PO   Take by mouth daily

## 2017-12-19 NOTE — DISCHARGE INSTRUCTIONS
Radiology  Discharge Instructions for Abdominal Paracentesis    You have had an abdominal paracentesis procedure today.  A needle or catheter was put into your abdomen to remove fluid for laboratory studies and/or to remove the extra fluid from your abdomen.    AFTER YOU ARE HOME:    Rest at home today.    Limit physical activity such as lifting, straining, or exercise for 48 hours.  You may resume normal activity in 24 hours.    Resume previous diet and medications.    You may remove bandage in 24 hours.    CALL YOUR PRIMARY PROVIDER IF:    You develop temperature over 101o F, or redness at the drainage site.    You have any other questions or concerns.    AFTER HOURS CALL [place] NURSE ADVISORS AT [phone]    COME TO EMERGENCY ROOM IF:    You develop severe abdominal pain, swelling the size of a baseball or larger, continuous oozing from puncture site longer than 24 hours, bleeding that saturates a gauze dressing even after you have put direct pressure on the site for 15 minutes, severe lightheadedness or fainting.  DO NOT DRIVE YOURSELF.      Your ordering physician will contact you with the laboratory results.        ADDITIONAL INSTRUCTIONS:       I have reviewed and understand these instructions.      __________________________________________________  Ivan Gonsior      __________________________________________________     Nurse/Radiologist Signature      Date: 12/19/2017

## 2017-12-19 NOTE — IP AVS SNAPSHOT
[facility]    [address] [facility] [place] [address]    Phone:  [phone]                                       After Visit Summary   12/19/2017    Ivan Bell    MRN: [id number]           After Visit Summary Signature Page     I have received my discharge instructions, and my questions have been answered. I have discussed any challenges I see with this plan with the nurse or doctor.    ..........................................................................................................................................  Patient/Patient Representative Signature      ..........................................................................................................................................  Patient Representative Print Name and Relationship to Patient    ..................................................               ................................................  Date                                            Time    ..........................................................................................................................................  Reviewed by Signature/Title    ...................................................              ..............................................  Date                                                            Time

## 2017-12-28 DIAGNOSIS — K70.31 ALCOHOLIC CIRRHOSIS OF LIVER WITH ASCITES (H): Primary | ICD-10-CM

## 2017-12-28 RX ORDER — ALBUMIN (HUMAN) 12.5 G/50ML
12.5 SOLUTION INTRAVENOUS 4 TIMES DAILY PRN
Status: CANCELLED
Start: 2017-12-28

## 2018-01-09 ENCOUNTER — MYC REFILL (OUTPATIENT)
Dept: GASTROENTEROLOGY | Facility: CLINIC | Age: 53
End: 2018-01-09

## 2018-01-09 DIAGNOSIS — K70.31 ALCOHOLIC CIRRHOSIS OF LIVER WITH ASCITES (H): ICD-10-CM

## 2018-01-09 DIAGNOSIS — K70.31 ALCOHOLIC CIRRHOSIS OF LIVER WITH ASCITES (H): Primary | ICD-10-CM

## 2018-01-09 DIAGNOSIS — I85.10 SECONDARY ESOPHAGEAL VARICES WITHOUT BLEEDING (H): ICD-10-CM

## 2018-01-09 RX ORDER — CARVEDILOL 6.25 MG/1
6.25 TABLET ORAL DAILY
Qty: 60 TABLET | Refills: 1 | Status: SHIPPED | OUTPATIENT
Start: 2018-01-09 | End: 2018-01-10

## 2018-01-10 ENCOUNTER — TELEPHONE (OUTPATIENT)
Dept: GASTROENTEROLOGY | Facility: CLINIC | Age: 53
End: 2018-01-10

## 2018-01-10 DIAGNOSIS — K70.31 ALCOHOLIC CIRRHOSIS OF LIVER WITH ASCITES (H): ICD-10-CM

## 2018-01-10 RX ORDER — CARVEDILOL 6.25 MG/1
6.25 TABLET ORAL DAILY
Qty: 60 TABLET | Refills: 3 | Status: ON HOLD | OUTPATIENT
Start: 2018-01-10 | End: 2018-03-09

## 2018-01-10 RX ORDER — FUROSEMIDE 20 MG
40 TABLET ORAL DAILY
Qty: 60 TABLET | Refills: 1 | Status: SHIPPED | OUTPATIENT
Start: 2018-01-10 | End: 2018-02-07

## 2018-01-19 ENCOUNTER — SURGERY (OUTPATIENT)
Age: 53
End: 2018-01-19

## 2018-01-19 ENCOUNTER — HOSPITAL ENCOUNTER (OUTPATIENT)
Facility: CLINIC | Age: 53
Discharge: HOME | End: 2018-01-19
Attending: INTERNAL MEDICINE | Admitting: INTERNAL MEDICINE
Payer: COMMERCIAL

## 2018-01-19 VITALS
RESPIRATION RATE: 13 BRPM | OXYGEN SATURATION: 95 % | BODY MASS INDEX: 22.62 KG/M2 | SYSTOLIC BLOOD PRESSURE: 119 MMHG | HEIGHT: 70 IN | DIASTOLIC BLOOD PRESSURE: 71 MMHG | WEIGHT: 158 LBS

## 2018-01-19 DIAGNOSIS — I85.10 SECONDARY ESOPHAGEAL VARICES WITHOUT BLEEDING (H): Primary | ICD-10-CM

## 2018-01-19 LAB — UPPER GI ENDOSCOPY: NORMAL

## 2018-01-19 PROCEDURE — 99152 MOD SED SAME PHYS/QHP 5/>YRS: CPT | Performed by: INTERNAL MEDICINE

## 2018-01-19 PROCEDURE — 99153 MOD SED SAME PHYS/QHP EA: CPT | Performed by: INTERNAL MEDICINE

## 2018-01-19 PROCEDURE — 25000125 ZZHC RX 250: Performed by: INTERNAL MEDICINE

## 2018-01-19 PROCEDURE — 43244 EGD VARICES LIGATION: CPT | Performed by: INTERNAL MEDICINE

## 2018-01-19 PROCEDURE — 25000128 H RX IP 250 OP 636: Performed by: INTERNAL MEDICINE

## 2018-01-19 PROCEDURE — G0500 MOD SEDAT ENDO SERVICE >5YRS: HCPCS | Performed by: INTERNAL MEDICINE

## 2018-01-19 RX ORDER — FENTANYL CITRATE 50 UG/ML
INJECTION, SOLUTION INTRAMUSCULAR; INTRAVENOUS PRN
Status: DISCONTINUED | OUTPATIENT
Start: 2018-01-19 | End: 2018-01-19 | Stop reason: HOSPADM

## 2018-01-19 RX ADMIN — MIDAZOLAM 2 MG: 1 INJECTION INTRAMUSCULAR; INTRAVENOUS at 08:09

## 2018-01-19 RX ADMIN — FENTANYL CITRATE 50 MCG: 50 INJECTION, SOLUTION INTRAMUSCULAR; INTRAVENOUS at 08:14

## 2018-01-19 RX ADMIN — FENTANYL CITRATE 50 MCG: 50 INJECTION, SOLUTION INTRAMUSCULAR; INTRAVENOUS at 08:19

## 2018-01-19 RX ADMIN — MIDAZOLAM 1 MG: 1 INJECTION INTRAMUSCULAR; INTRAVENOUS at 08:14

## 2018-01-19 RX ADMIN — FENTANYL CITRATE 100 MCG: 50 INJECTION, SOLUTION INTRAMUSCULAR; INTRAVENOUS at 08:09

## 2018-01-19 RX ADMIN — MIDAZOLAM 1 MG: 1 INJECTION INTRAMUSCULAR; INTRAVENOUS at 08:19

## 2018-01-19 RX ADMIN — BENZOCAINE 1 SPRAY: 220 SPRAY, METERED PERIODONTAL at 08:06

## 2018-01-19 NOTE — OR NURSING
Procedure: EGD with banding (5 placed)  Sedation: Conscious sedation  O2: 2 LPM NC, room air post  Tolerated: Well. VS stable during and post procedure. Not c/o abdominal or chest pain  Report: Given to recovery RN  Pt to recovery area in stable condition, accompanied by RN    Sue French RN

## 2018-01-22 NOTE — ADDENDUM NOTE
Encounter addended by: Radha Shane RN on: 1/22/2018  6:12 AM<BR>     Actions taken: Charge Capture section accepted

## 2018-01-23 ENCOUNTER — TELEPHONE (OUTPATIENT)
Dept: GASTROENTEROLOGY | Facility: CLINIC | Age: 53
End: 2018-01-23

## 2018-02-07 ENCOUNTER — RADIANT APPOINTMENT (OUTPATIENT)
Dept: ULTRASOUND IMAGING | Facility: CLINIC | Age: 53
End: 2018-02-07
Attending: INTERNAL MEDICINE
Payer: COMMERCIAL

## 2018-02-07 ENCOUNTER — OFFICE VISIT (OUTPATIENT)
Dept: INFUSION THERAPY | Facility: CLINIC | Age: 53
End: 2018-02-07
Attending: INTERNAL MEDICINE
Payer: COMMERCIAL

## 2018-02-07 ENCOUNTER — OFFICE VISIT (OUTPATIENT)
Dept: GASTROENTEROLOGY | Facility: CLINIC | Age: 53
End: 2018-02-07
Attending: INTERNAL MEDICINE
Payer: COMMERCIAL

## 2018-02-07 VITALS
RESPIRATION RATE: 20 BRPM | WEIGHT: 162.2 LBS | DIASTOLIC BLOOD PRESSURE: 71 MMHG | BODY MASS INDEX: 23.22 KG/M2 | HEIGHT: 70 IN | SYSTOLIC BLOOD PRESSURE: 121 MMHG | HEART RATE: 77 BPM | TEMPERATURE: 98 F

## 2018-02-07 VITALS
OXYGEN SATURATION: 100 % | DIASTOLIC BLOOD PRESSURE: 61 MMHG | SYSTOLIC BLOOD PRESSURE: 112 MMHG | BODY MASS INDEX: 21.21 KG/M2 | HEART RATE: 76 BPM | WEIGHT: 147.8 LBS

## 2018-02-07 DIAGNOSIS — K70.31 ALCOHOLIC CIRRHOSIS OF LIVER WITH ASCITES (H): Primary | ICD-10-CM

## 2018-02-07 DIAGNOSIS — K70.31 ALCOHOLIC CIRRHOSIS OF LIVER WITH ASCITES (H): ICD-10-CM

## 2018-02-07 LAB
ALBUMIN SERPL-MCNC: 2.7 G/DL (ref 3.4–5)
ALP SERPL-CCNC: 217 U/L (ref 40–150)
ALT SERPL W P-5'-P-CCNC: 14 U/L (ref 0–70)
ANION GAP SERPL CALCULATED.3IONS-SCNC: 7 MMOL/L (ref 3–14)
AST SERPL W P-5'-P-CCNC: 37 U/L (ref 0–45)
BILIRUB DIRECT SERPL-MCNC: 0.9 MG/DL (ref 0–0.2)
BILIRUB SERPL-MCNC: 1.8 MG/DL (ref 0.2–1.3)
BUN SERPL-MCNC: 10 MG/DL (ref 7–30)
CALCIUM SERPL-MCNC: 8.1 MG/DL (ref 8.5–10.1)
CHLORIDE SERPL-SCNC: 110 MMOL/L (ref 94–109)
CO2 SERPL-SCNC: 23 MMOL/L (ref 20–32)
CREAT SERPL-MCNC: 1.13 MG/DL (ref 0.66–1.25)
ERYTHROCYTE [DISTWIDTH] IN BLOOD BY AUTOMATED COUNT: 21.4 % (ref 10–15)
GFR SERPL CREATININE-BSD FRML MDRD: 68 ML/MIN/1.7M2
GLUCOSE SERPL-MCNC: 89 MG/DL (ref 70–99)
HCT VFR BLD AUTO: 31.4 % (ref 40–53)
HGB BLD-MCNC: 9.9 G/DL (ref 13.3–17.7)
INR PPP: 1.52 (ref 0.86–1.14)
MCH RBC QN AUTO: 27.4 PG (ref 26.5–33)
MCHC RBC AUTO-ENTMCNC: 31.5 G/DL (ref 31.5–36.5)
MCV RBC AUTO: 87 FL (ref 78–100)
PLATELET # BLD AUTO: 62 10E9/L (ref 150–450)
POTASSIUM SERPL-SCNC: 3.8 MMOL/L (ref 3.4–5.3)
PROT SERPL-MCNC: 7.2 G/DL (ref 6.8–8.8)
RBC # BLD AUTO: 3.61 10E12/L (ref 4.4–5.9)
SODIUM SERPL-SCNC: 140 MMOL/L (ref 133–144)
WBC # BLD AUTO: 2.6 10E9/L (ref 4–11)

## 2018-02-07 PROCEDURE — 25000128 H RX IP 250 OP 636: Mod: ZF | Performed by: INTERNAL MEDICINE

## 2018-02-07 PROCEDURE — G0463 HOSPITAL OUTPT CLINIC VISIT: HCPCS | Mod: 25

## 2018-02-07 PROCEDURE — 25000125 ZZHC RX 250: Mod: ZF | Performed by: INTERNAL MEDICINE

## 2018-02-07 PROCEDURE — 85027 COMPLETE CBC AUTOMATED: CPT | Performed by: INTERNAL MEDICINE

## 2018-02-07 PROCEDURE — 85610 PROTHROMBIN TIME: CPT | Performed by: INTERNAL MEDICINE

## 2018-02-07 PROCEDURE — 80048 BASIC METABOLIC PNL TOTAL CA: CPT | Performed by: INTERNAL MEDICINE

## 2018-02-07 PROCEDURE — 80076 HEPATIC FUNCTION PANEL: CPT | Performed by: INTERNAL MEDICINE

## 2018-02-07 PROCEDURE — 27210190 US PARACENTESIS

## 2018-02-07 PROCEDURE — P9047 ALBUMIN (HUMAN), 25%, 50ML: HCPCS | Mod: ZF | Performed by: INTERNAL MEDICINE

## 2018-02-07 RX ORDER — ALBUMIN (HUMAN) 12.5 G/50ML
12.5 SOLUTION INTRAVENOUS 4 TIMES DAILY PRN
Status: DISCONTINUED | OUTPATIENT
Start: 2018-02-07 | End: 2018-02-07

## 2018-02-07 RX ORDER — ALBUMIN (HUMAN) 12.5 G/50ML
12.5 SOLUTION INTRAVENOUS 4 TIMES DAILY PRN
Status: CANCELLED
Start: 2018-02-07

## 2018-02-07 RX ORDER — SPIRONOLACTONE 100 MG/1
150 TABLET, FILM COATED ORAL DAILY
Qty: 120 TABLET | Refills: 2 | Status: SHIPPED | OUTPATIENT
Start: 2018-02-07 | End: 2018-07-02

## 2018-02-07 RX ORDER — FUROSEMIDE 20 MG
60 TABLET ORAL DAILY
Qty: 120 TABLET | Refills: 2 | Status: SHIPPED | OUTPATIENT
Start: 2018-02-07 | End: 2018-07-02

## 2018-02-07 RX ORDER — ALBUMIN (HUMAN) 12.5 G/50ML
12.5 SOLUTION INTRAVENOUS 4 TIMES DAILY PRN
Status: DISCONTINUED | OUTPATIENT
Start: 2018-02-07 | End: 2018-02-07 | Stop reason: HOSPADM

## 2018-02-07 RX ADMIN — ALBUMIN HUMAN 12.5 G: 0.25 SOLUTION INTRAVENOUS at 10:54

## 2018-02-07 RX ADMIN — LIDOCAINE HYDROCHLORIDE 20 ML: 10 INJECTION, SOLUTION INFILTRATION; PERINEURAL at 10:37

## 2018-02-07 RX ADMIN — ALBUMIN HUMAN 12.5 G: 0.25 SOLUTION INTRAVENOUS at 10:46

## 2018-02-07 RX ADMIN — ALBUMIN HUMAN 12.5 G: 0.25 SOLUTION INTRAVENOUS at 11:03

## 2018-02-07 RX ADMIN — ALBUMIN HUMAN 12.5 G: 0.25 SOLUTION INTRAVENOUS at 10:38

## 2018-02-07 ASSESSMENT — PAIN SCALES - GENERAL: PAINLEVEL: NO PAIN (0)

## 2018-02-07 NOTE — MR AVS SNAPSHOT
After Visit Summary   2/7/2018    Ivan Bell    MRN: [id number]           Patient Information     Date Of Birth          1965        Visit Information        Provider Department      2/7/2018 8:00 AM Gonzalo Wahl MD [facility]atology        Today's Diagnoses     Alcoholic cirrhosis of liver with ascites (H)    -  1      Care Instructions    1.  Paracentesis today or this week  2.  Continue low salt diet  3.  Increase furosemide to 60mg (3 tabs) per day  4.  Increase spironolactone to 150mg (1.5 tabs) per day  5.  Check blood work next week  6.  Endoscopy in early March (UPPER GI ENDOSCOPY is scheduled for Friday March 9, 2018 at 9:45am, check-in at 8:45am. Their phone number is [phone].    7.  Follow-up with me in the office in 2 months    Gonzalo Bales MD  Hepatology  [facility]          Follow-ups after your visit        Follow-up notes from your care team     Return in about 2 months (around 4/7/2018).      Your next 10 appointments already scheduled     Feb 07, 2018  2:00 PM CST   Paracentesis Visit with Uc Spec Inf Para Provider,  39 ATC   [facility] ([facility])    [address]  [address]  [facility] [id number] [phone]            Mar 09, 2018   Procedure with Gonzalo Miramontes MD   [facility] [place], Endoscopy ([facility])    [address] [address] [phone]           The [facility] is located on the corner of [address] on the [facility]. It is easily accessible from virtually any point in the [facility] area, via [address] and [address].            Apr 10, 2018  8:00 AM CDT   (Arrive by 7:45 AM)   Return General Liver with Gonzalo Miramontes MD   [facility] ([facility])    [address] [place] [address]  [address]  [facility] [id number] [phone]              Future tests that were ordered for you today     Open Future Orders        Priority Expected Expires Ordered    UPPER GI ENDOSCOPY Routine  3/24/2018 2/7/2018    Basic metabolic panel Routine  3/9/2018 2/7/2018    US Paracentesis Routine  2/7/2019 2/7/2018            Who to contact     If you have questions or need follow up information about today's clinic visit or your schedule please contact [facility] HEPATOLOGY directly at [phone].  Normal or non-critical lab and imaging results will be communicated to you by OceanTailerhart, letter or phone within 4 business days after the clinic has received the results. If you do not hear from us within 7 days, please contact the clinic through [employer] or phone. If you have a critical or abnormal lab result, we will notify you by phone as soon as possible.  Submit refill requests through [employer] or call your pharmacy and they will forward the refill request to us. Please allow 3 business days for your refill to be completed.          Additional Information About Your Visit        [employer] Information     [employer] gives you secure access to your electronic health record. If you see a primary care provider, you can also send messages to your care team and make appointments. If you have questions, please call your primary care clinic.  If you do not have a primary care provider, please call [phone] and they will assist you.        Care EveryWhere ID     This is your Care EveryWhere ID. This could be used by other organizations to access your [place] medical records  [id number]        Your Vitals Were     Pulse Temperature Respirations Height BMI (Body Mass Index)       77 98  F (36.7  C) (Oral) 20 1.778 m (5' 10\") 23.27 kg/m2        Blood Pressure from Last 3 Encounters:   02/07/18 121/71   01/19/18 119/71   12/19/17 124/74    Weight from Last 3 Encounters:   02/07/18 73.6 kg (162 lb 3.2 oz)   01/19/18 71.7 kg (158 lb) "   11/13/17 75.8 kg (167 lb)                 Today's Medication Changes          These changes are accurate as of 2/7/18  8:54 AM.  If you have any questions, ask your nurse or doctor.               These medicines have changed or have updated prescriptions.        Dose/Directions    furosemide 20 MG tablet   Commonly known as:  LASIX   This may have changed:  how much to take   Used for:  Alcoholic cirrhosis of liver with ascites (H)   Changed by:  Gonzalo Wahl MD        Dose:  60 mg   Take 3 tablets (60 mg) by mouth daily   Quantity:  120 tablet   Refills:  2       spironolactone 100 MG tablet   Commonly known as:  ALDACTONE   This may have changed:  how much to take   Used for:  Alcoholic cirrhosis of liver with ascites (H)   Changed by:  Gonzalo Wahl MD        Dose:  150 mg   Take 1.5 tablets (150 mg) by mouth daily   Quantity:  120 tablet   Refills:  2            Where to get your medicines      These medications were sent to [address]  [address] [place] [address]     Phone:  [phone]     furosemide 20 MG tablet    spironolactone 100 MG tablet                Primary Care Provider Office Phone # Fax #    Rosette Wills [phone] [address] [facility] [address]        Equal Access to Services     KAITLYN CAMACHO AH: Hadii linnea stewart hadasho Soomaali, waaxda luqadaha, qaybta kaalmada adeegyada, waxay jenellein hayberenicen han molina. So [facility] [phone].    ATENCIÓN: Si habla español, tiene a lee disposición servicios gratuitos de asistencia lingüística. Llame al [phone].    We comply with applicable federal civil rights laws and [place] laws. We do not discriminate on the basis of race, color, national origin, age, disability, sex, sexual orientation, or gender identity.            Thank you!     Thank you for choosing [facility]PATOLOG[facility]  for your care. Our goal is always to provide you  with excellent care. Hearing back from our patients is one way we can continue to improve our services. Please take a few minutes to complete the written survey that you may receive in the mail after your visit with us. Thank you!             Your Updated Medication List - Protect others around you: Learn how to safely use, store and throw away your medicines at www.disposemymeds.org.          This list is accurate as of 2/7/18  8:54 AM.  Always use your most recent med list.                   Brand Name Dispense Instructions for use Diagnosis    carvedilol 6.25 MG tablet    COREG    60 tablet    Take 1 tablet (6.25 mg) by mouth daily    Secondary esophageal varices without bleeding (H), Alcoholic cirrhosis of liver with ascites (H)       furosemide 20 MG tablet    LASIX    120 tablet    Take 3 tablets (60 mg) by mouth daily    Alcoholic cirrhosis of liver with ascites (H)       lactulose 10 GM/15ML solution    CHRONULAC     Take 15 g by mouth 2 times daily        MULTIPLE VITAMINS PO      Take 1 tablet by mouth daily        SILDENAFIL CITRATE PO      Take 10 mg by mouth daily        spironolactone 100 MG tablet    ALDACTONE    120 tablet    Take 1.5 tablets (150 mg) by mouth daily    Alcoholic cirrhosis of liver with ascites (H)       TYLENOL PO      Take by mouth every 4 hours as needed for mild pain or fever

## 2018-02-07 NOTE — PROGRESS NOTES
DEBBIE Bales: D/C Dr. Smith paracentesis therapy plan and continue with the Dr. Bales Therapy plan as written.  D/C at the end of the procedure as this is a one time order for a paracentesis.

## 2018-02-07 NOTE — PROGRESS NOTES
Paracentesis Nursing Note  Ivan Bell presents [facility].    During today's appointment orders from Dr. Bales were completed.    Progress Note:  Patient identification verified by name and date of birth.  Assessment completed.  Vitals monitored throughout appointment and recorded in Doc Flowsheets.  See proceduralist note in ultrasound.    Date of consent or authorization: 2/7/18.  Invasive Procedure Safety Checklist was completed and sent for scanning.     Paracentesis performed by Jean Sanchez PA-C Radiology.    The following labs were communicated to provider performing paracentesis:  Lab Results   Component Value Date    PLT 62 02/07/2018       Total amount of ascites fluid drained: 8.1 liters.  Color of ascites fluid: yellow.  Total amount of albumin given: 50  grams.    Patient tolerated procedure well.    Post procedure,denies pain or discomfort post paracentesis.      Discharge Plan:  Discharge instructions were reviewed with patient.  Patient/Representative verbalized understanding and all questions were answered.   Discharged from [facility] in stable condition.    HAL YANG RN        /73  Pulse 78  SpO2 100%

## 2018-02-07 NOTE — NURSING NOTE
"Chief Complaint   Patient presents with     RECHECK     3 mo follow up       Initial /71  Pulse 77  Temp 98  F (36.7  C) (Oral)  Resp 20  Ht 1.778 m (5' 10\")  Wt 73.6 kg (162 lb 3.2 oz)  BMI 23.27 kg/m2 Estimated body mass index is 23.27 kg/(m^2) as calculated from the following:    Height as of this encounter: 1.778 m (5' 10\").    Weight as of this encounter: 73.6 kg (162 lb 3.2 oz).  Medication Reconciliation: complete   CRISTIANO GOMEZ CMA      "

## 2018-02-07 NOTE — LETTER
2/7/2018      RE: Ivan Bell  [address] [facility] [address]       [employer]    Hepatology follow-up    Follow-up visit for cirrhosis, ascites    Subjective:  52 year old male    Cirrhosis  - ETOH, last ETOH 2/14/16  - hx ascites, HE  - hx variceal bleed Oct 2017  - last EGD Jan 2018- medium EV with banding, mod portal hypertensive gastropathy  - HCC screening- CT A/P with IV contrast Oct 2017    Patient comes to clinic this morning for follow-up of ascites.  Last clinic visit Nov 2017.  Since then, patient has undergone EGD twice with banding of esophageal varices.  Patient has undergone therapeutic paracentesis once in Dec 2017.  No new medications, ER visits or hospital admissions since last clinic visit.    Patient is doing fine.  He reports occasional transient dyspnea.  No cough or chest pain.  His wife has noticed increasing abdominal distension but he denies any abdominal discomfort or pain.  He also denies lower extremity edema.  He is adherent to a low Na diet and his diuretics.    Patient denies lethargy or confusion.  He is taking lactulose once per day (in the evenings) and moves his bowels 2-3 times per day.    Patient denies jaundice, melena, hematemesis or hematochezia.    Patient denies fevers, sweats or chills.  Weight stable- it has been fluctuating by 5lbs since last clinic visit.    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  He last drank any alcohol in October.  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      Medical hx Surgical hx   Past Medical History:   Diagnosis Date     Ascites      Cirrhosis (H)      Esophageal varices (H)      Hepatitis      Hypertension      Left calcaneus fracture 1/9/2006     Portal vein thrombosis       Past Surgical History:   Procedure Laterality Date     ANKLE SURGERY Left      COLONOSCOPY N/A 3/31/2016    Procedure: COLONOSCOPY;  Surgeon: Rhys Uriostegui MD;  Location:  GI     ESOPHAGOSCOPY, GASTROSCOPY,  "DUODENOSCOPY (EGD), COMBINED N/A 3/31/2016    Procedure: COMBINED ESOPHAGOSCOPY, GASTROSCOPY, DUODENOSCOPY (EGD);  Surgeon: Rhys Uriostegui MD;  [facility]     KNEE SURGERY Left      KNEE SURGERY Right      SIGMOIDOSCOPY FLEXIBLE N/A 10/31/2017    Procedure: SIGMOIDOSCOPY FLEXIBLE;;  Surgeon: Armaan Adams MD;  [facility]          Medications  Prior to Admission medications    Medication Sig Start Date End Date Taking? Authorizing Provider   furosemide (LASIX) 20 MG tablet Take 2 tablets (40 mg) by mouth daily 1/10/18  Yes Gonzalo Wahl MD   carvedilol (COREG) 6.25 MG tablet Take 1 tablet (6.25 mg) by mouth daily 1/10/18  Yes Gonzalo Wahl MD   spironolactone (ALDACTONE) 100 MG tablet Take 1 tablet (100 mg) by mouth daily 12/6/17  Yes Gonzalo Wahl MD   SILDENAFIL CITRATE PO Take 10 mg by mouth daily   Yes Unknown, Entered By History   lactulose (CHRONULAC) 10 GM/15ML solution Take 15 g by mouth 2 times daily    Yes Reported, Patient   Acetaminophen (TYLENOL PO) Take by mouth every 4 hours as needed for mild pain or fever   Yes Reported, Patient   MULTIPLE VITAMINS PO Take 1 tablet by mouth daily   Yes Reported, Patient       Allergies  Allergies   Allergen Reactions     Benadryl [Diphenhydramine] Other (See Comments)     Delirium (visual and auditory hallucinations)       Review of systems  A 10-point review of systems was negative    Examination  /71  Pulse 77  Temp 98  F (36.7  C) (Oral)  Resp 20  Ht 1.778 m (5' 10\")  Wt 73.6 kg (162 lb 3.2 oz)  BMI 23.27 kg/m2  Body mass index is 23.27 kg/(m^2).    Gen- well, NAD, A+Ox3, normal color, fronto-temporal wasting  CVS- RRR  RS- CTA  Abd- distended, soft, non-tender, dull to percuss, no obvious organomegaly on palpation or percussion, BS+  Extr- hands normal, no BESSY  Skin- no rash or jaundice  Neuro- no asterixis  Psych- normal mood    Laboratory  Lab Results   Component Value Date     02/07/2018 "    POTASSIUM 3.8 02/07/2018    CHLORIDE 110 02/07/2018    CO2 23 02/07/2018    BUN 10 02/07/2018    CR 1.13 02/07/2018       Lab Results   Component Value Date    BILITOTAL 1.8 02/07/2018    ALT 14 02/07/2018    AST 37 02/07/2018    ALKPHOS 217 02/07/2018       Lab Results   Component Value Date    ALBUMIN 2.7 02/07/2018    PROTTOTAL 7.2 02/07/2018        Lab Results   Component Value Date    WBC 5.4 11/08/2017    HGB 8.9 11/08/2017    MCV 86 11/08/2017    PLT 92 12/19/2017       Lab Results   Component Value Date    INR 1.52 02/07/2018       Radiology  Nil new    Assessment  52 year old male who presents for follow-up of decompensated alcoholic cirrhosis complicated with ascites and history of variceal bleed in Oct 2017.  MELD-Na= 14 (stable).  Last ETOH= Oct 2017.  Will obtain paracentesis and increase diuretics to optimize ascites.  If there is any evidence of renal dysfunction, will consider TIPS placement which would also address ongoing issues with esophageal varices despite non-selective beta-blocker therapy.      Plan  1.  Therapeutic paracentesis  2.  Increase furosemide to 60mg PO Q24  3.  Increase spironolactone to 150mg PO Q24  4.  Check BMP next week  5.  EGD in 4-6 weeks  6.  Continue carvedilol  7.  Follow-up in 2 months    Gonzalo Bales MD  Hepatology  [facility]

## 2018-02-07 NOTE — PATIENT INSTRUCTIONS
1.  Paracentesis today or this week  2.  Continue low salt diet  3.  Increase furosemide to 60mg (3 tabs) per day  4.  Increase spironolactone to 150mg (1.5 tabs) per day  5.  Check blood work next week  6.  Endoscopy in early March (UPPER GI ENDOSCOPY is scheduled for Friday March 9, 2018 at 9:45am, check-in at 8:45am. Their phone number is [phone].    7.  Follow-up with me in the office in 2 months    Gonzalo Bales MD  Hepatology  [facility]

## 2018-02-07 NOTE — PATIENT INSTRUCTIONS
Dear Ivan Bell    Thank you for choosing [facility] ([facility]) for your paracentesis.  The following information is a summary of our appointment as well as important reminders.    Discharge Instructions for Paracentesis  Paracentesis is a procedure to remove extra fluid from your belly (abdomen). This fluid buildup in the abdomen is called ascites. The procedure may have been done to take a sample of the fluid. Or, it may have been done to drain the extra fluid from your abdomen and help make you more comfortable.     Ascites is buildup of excess fluid in the abdomen.   Home care    If you have pain after the procedure, your healthcare provider can prescribe or recommend pain medicines. Take these exactly as directed. If you stopped taking other medicines before the procedure, ask your provider when you can start them again.    Take it easy for 24 hours after the procedure. Avoid physical activity until your provider says it s OK.    You will have a small bandage over the puncture site. Stitches (sutures), surgical staples, adhesive tapes, adhesive strips, or surgical glue may be used to close the incision. They also help stop bleeding and speed healing. You may take the bandage off in 24 hours.    Check the puncture site for the signs of infection listed below.  Follow-up care  Make a follow-up appointment with your healthcare provider as directed. During your follow-up visit, your provider will check your healing. Let your provider know how you are feeling. You can also discuss the cause of your ascites and if you need any further treatment.  When to seek medical advice  Call your healthcare provider if you have any of the following after the procedure:    A fever of 100.4 F (38.0 C) or higher    Trouble breathing    Pain that doesn't go away even after taking pain medicine    Belly pain not caused by having the skin punctured    Bleeding from the  puncture site    More than a small amount of fluid leaking from the puncture site    Swollen belly    Signs of infection at the puncture site. These include increased pain, redness, or swelling, warmth, or bad-smelling drainage.    Blood in your urine    Feeling dizzy or lightheaded, or fainting   Date Last Reviewed: 7/1/2016 2000-2017 The [employer]. [address] [place] [address]. All rights reserved. This information is not intended as a substitute for professional medical care. Always follow your healthcare professional's instructions.    We look forward in seeing you on your next appointment here at [facility].  Please don t hesitate to call us at [phone] to reschedule any of your appointments or to speak with one of the [facility] registered nurses.  It was a pleasure taking care of you today.    Sincerely,    [facility]  Specialty Infusion & Procedure Center  [address] [place]  [address]  Phone:  [phone]

## 2018-02-07 NOTE — MR AVS SNAPSHOT
After Visit Summary   2/7/2018    Ivan Bell    MRN: [id number]           Patient Information     Date Of Birth          1965        Visit Information        Provider Department      2/7/2018 9:30 AM Provider, [facility] Spec Inf Para; [facility] Specialty [facility] Procedure        Today's Diagnoses     Alcoholic cirrhosis of liver with ascites (H)    -  1      Care Instructions    Dear Ivan Bell    Thank you for choosing [facility] ([facility]) for your paracentesis.  The following information is a summary of our appointment as well as important reminders.    Discharge Instructions for Paracentesis  Paracentesis is a procedure to remove extra fluid from your belly (abdomen). This fluid buildup in the abdomen is called ascites. The procedure may have been done to take a sample of the fluid. Or, it may have been done to drain the extra fluid from your abdomen and help make you more comfortable.     Ascites is buildup of excess fluid in the abdomen.   Home care    If you have pain after the procedure, your healthcare provider can prescribe or recommend pain medicines. Take these exactly as directed. If you stopped taking other medicines before the procedure, ask your provider when you can start them again.    Take it easy for 24 hours after the procedure. Avoid physical activity until your provider says it s OK.    You will have a small bandage over the puncture site. Stitches (sutures), surgical staples, adhesive tapes, adhesive strips, or surgical glue may be used to close the incision. They also help stop bleeding and speed healing. You may take the bandage off in 24 hours.    Check the puncture site for the signs of infection listed below.  Follow-up care  Make a follow-up appointment with your healthcare provider as directed. During your follow-up visit, your provider will check your healing. Let your provider  know how you are feeling. You can also discuss the cause of your ascites and if you need any further treatment.  When to seek medical advice  Call your healthcare provider if you have any of the following after the procedure:    A fever of 100.4 F (38.0 C) or higher    Trouble breathing    Pain that doesn't go away even after taking pain medicine    Belly pain not caused by having the skin punctured    Bleeding from the puncture site    More than a small amount of fluid leaking from the puncture site    Swollen belly    Signs of infection at the puncture site. These include increased pain, redness, or swelling, warmth, or bad-smelling drainage.    Blood in your urine    Feeling dizzy or lightheaded, or fainting   Date Last Reviewed: 7/1/2016 2000-2017 [employer]. [address] [place] [address]. All rights reserved. This information is not intended as a substitute for professional medical care. Always follow your healthcare professional's instructions.    We look forward in seeing you on your next appointment here at [facility].  Please don t hesitate to call us at [phone] to reschedule any of your appointments or to speak with one of the [facility] registered nurses.  It was a pleasure taking care of you today.    Sincerely,    [facility]  Specialty Infusion & Procedure Center  [address] [place] [address]  [address]  Phone:  [phone]            Follow-ups after your visit        Your next 10 appointments already scheduled     Mar 09, 2018   Procedure with Gonzalo Miramontes MD   [facility] [place] End[facility] ([facility])    [address] [address] [phone]           The [facility] is located on the corner of [facility] and [facility] on the [address]. It is easily accessible from virtually any point in the [facility] area, via [employer] and [employer].             Apr 10, 2018  8:00 AM CDT   (Arrive by 7:45 AM)   Return General Liver with Gonzalo Miramontes MD   [facility] ([facility])    [address]  [address]  [facility] [address] [phone]              Future tests that were ordered for you today     Open Future Orders        Priority Expected Expires Ordered    UPPER GI ENDOSCOPY Routine  3/24/2018 2/7/2018    Basic metabolic panel Routine  3/9/2018 2/7/2018    US Paracentesis Routine  2/7/2019 2/7/2018            Who to contact     If you have questions or need follow up information about today's clinic visit or your schedule please contact [facility] SPECIALTY AND PROCEDURE directly at [phone].  Normal or non-critical lab and imaging results will be communicated to you by MyChart, letter or phone within 4 business days after the clinic has received the results. If you do not hear from us within 7 days, please contact the clinic through [employer] or phone. If you have a critical or abnormal lab result, we will notify you by phone as soon as possible.  Submit refill requests through [employer] or call your pharmacy and they will forward the refill request to us. Please allow 3 business days for your refill to be completed.          Additional Information About Your Visit        [employer] Information     [employer] gives you secure access to your electronic health record. If you see a primary care provider, you can also send messages to your care team and make appointments. If you have questions, please call your primary care clinic.  If you do not have a primary care provider, please call [phone] and they will assist you.        Care EveryWhere ID     This is your Care EveryWhere ID. This could be used by other organizations to access your [place] medical records  [id number]        Your Vitals Were     Pulse Pulse Oximetry BMI (Body Mass Index)             76 100% 21.21 kg/m2           Blood Pressure from Last 3 Encounters:   02/07/18 112/61   02/07/18 121/71   01/19/18 119/71    Weight from Last 3 Encounters:   02/07/18 67 kg (147 lb 12.8 oz)   02/07/18 73.6 kg (162 lb 3.2 oz)   01/19/18 71.7 kg (158 lb)              We Performed the Following     US Paracentesis          Today's Medication Changes          These changes are accurate as of 2/7/18 11:49 AM.  If you have any questions, ask your nurse or doctor.               These medicines have changed or have updated prescriptions.        Dose/Directions    furosemide 20 MG tablet   Commonly known as:  LASIX   This may have changed:  how much to take   Used for:  Alcoholic cirrhosis of liver with ascites (H)   Changed by:  Gonzalo Wahl MD        Dose:  60 mg   Take 3 tablets (60 mg) by mouth daily   Quantity:  120 tablet   Refills:  2       spironolactone 100 MG tablet   Commonly known as:  ALDACTONE   This may have changed:  how much to take   Used for:  Alcoholic cirrhosis of liver with ascites (H)   Changed by:  Gonzalo Wahl MD        Dose:  150 mg   Take 1.5 tablets (150 mg) by mouth daily   Quantity:  120 tablet   Refills:  2            Where to get your medicines      These medications were sent to [facility] [address] [address]     Phone:  [phone]     furosemide 20 MG tablet    spironolactone 100 MG tablet                Primary Care Provider Office Phone # Fax #    Rosette Wills [phone]       [address] [place] [address]        Equal Access to Services     [facility] Hospital[facility] AH: Hadii linnea stewart hadasho Soomaali, waaxda luqadaha, qaybta kaalmada adeegyada, jenny molina. So [facility] [phone].    ATENCIÓN: Si habla español, tiene a lee disposición servicios gratuitos de asistencia lingüística. Llame al [phone].    We comply with applicable federal civil rights laws and [place]  laws. We do not discriminate on the basis of race, color, national origin, age, disability, sex, sexual orientation, or gender identity.            Thank you!     Thank you for choosing [facility] SPECIALTY AND PROCEDURE  for your care. Our goal is always to provide you with excellent care. Hearing back from our patients is one way we can continue to improve our services. Please take a few minutes to complete the written survey that you may receive in the mail after your visit with us. Thank you!             Your Updated Medication List - Protect others around you: Learn how to safely use, store and throw away your medicines at www.disposemymeds.org.          This list is accurate as of 2/7/18 11:49 AM.  Always use your most recent med list.                   Brand Name Dispense Instructions for use Diagnosis    carvedilol 6.25 MG tablet    COREG    60 tablet    Take 1 tablet (6.25 mg) by mouth daily    Secondary esophageal varices without bleeding (H), Alcoholic cirrhosis of liver with ascites (H)       furosemide 20 MG tablet    LASIX    120 tablet    Take 3 tablets (60 mg) by mouth daily    Alcoholic cirrhosis of liver with ascites (H)       lactulose 10 GM/15ML solution    CHRONULAC     Take 15 g by mouth 2 times daily        MULTIPLE VITAMINS PO      Take 1 tablet by mouth daily        SILDENAFIL CITRATE PO      Take 10 mg by mouth daily        spironolactone 100 MG tablet    ALDACTONE    120 tablet    Take 1.5 tablets (150 mg) by mouth daily    Alcoholic cirrhosis of liver with ascites (H)       TYLENOL PO      Take by mouth every 4 hours as needed for mild pain or fever

## 2018-02-07 NOTE — PROGRESS NOTES
[facility]    Hepatology follow-up    Follow-up visit for cirrhosis, ascites    Subjective:  52 year old male    Cirrhosis  - ETOH, last ETOH 2/14/16  - hx ascites, HE  - hx variceal bleed Oct 2017  - last EGD Jan 2018- medium EV with banding, mod portal hypertensive gastropathy  - HCC screening- CT A/P with IV contrast Oct 2017    Patient comes to clinic this morning for follow-up of ascites.  Last clinic visit Nov 2017.  Since then, patient has undergone EGD twice with banding of esophageal varices.  Patient has undergone therapeutic paracentesis once in Dec 2017.  No new medications, ER visits or hospital admissions since last clinic visit.    Patient is doing fine.  He reports occasional transient dyspnea.  No cough or chest pain.  His wife has noticed increasing abdominal distension but he denies any abdominal discomfort or pain.  He also denies lower extremity edema.  He is adherent to a low Na diet and his diuretics.    Patient denies lethargy or confusion.  He is taking lactulose once per day (in the evenings) and moves his bowels 2-3 times per day.    Patient denies jaundice, melena, hematemesis or hematochezia.    Patient denies fevers, sweats or chills.  Weight stable- it has been fluctuating by 5lbs since last clinic visit.    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  He last drank any alcohol in October.  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      Medical hx Surgical hx   Past Medical History:   Diagnosis Date     Ascites      Cirrhosis (H)      Esophageal varices (H)      Hepatitis      Hypertension      Left calcaneus fracture 1/9/2006     Portal vein thrombosis       Past Surgical History:   Procedure Laterality Date     ANKLE SURGERY Left      COLONOSCOPY N/A 3/31/2016    Procedure: COLONOSCOPY;  Surgeon: Rhys Uriostegui MD;  Location:  GI     ESOPHAGOSCOPY, GASTROSCOPY, DUODENOSCOPY (EGD), COMBINED N/A 3/31/2016    Procedure: COMBINED ESOPHAGOSCOPY, GASTROSCOPY,  "DUODENOSCOPY (EGD);  Surgeon: Rhys Uriostegui MD;  Location: [facility]     KNEE SURGERY Left      KNEE SURGERY Right      SIGMOIDOSCOPY FLEXIBLE N/A 10/31/2017    Procedure: SIGMOIDOSCOPY FLEXIBLE;;  Surgeon: Armaan Adams MD;  Location: [facility]          Medications  Prior to Admission medications    Medication Sig Start Date End Date Taking? Authorizing Provider   furosemide (LASIX) 20 MG tablet Take 2 tablets (40 mg) by mouth daily 1/10/18  Yes Gonzalo Wahl MD   carvedilol (COREG) 6.25 MG tablet Take 1 tablet (6.25 mg) by mouth daily 1/10/18  Yes Gonzalo Wahl MD   spironolactone (ALDACTONE) 100 MG tablet Take 1 tablet (100 mg) by mouth daily 12/6/17  Yes Gonzalo Wahl MD   SILDENAFIL CITRATE PO Take 10 mg by mouth daily   Yes Unknown, Entered By History   lactulose (CHRONULAC) 10 GM/15ML solution Take 15 g by mouth 2 times daily    Yes Reported, Patient   Acetaminophen (TYLENOL PO) Take by mouth every 4 hours as needed for mild pain or fever   Yes Reported, Patient   MULTIPLE VITAMINS PO Take 1 tablet by mouth daily   Yes Reported, Patient       Allergies  Allergies   Allergen Reactions     Benadryl [Diphenhydramine] Other (See Comments)     Delirium (visual and auditory hallucinations)       Review of systems  A 10-point review of systems was negative    Examination  /71  Pulse 77  Temp 98  F (36.7  C) (Oral)  Resp 20  Ht 1.778 m (5' 10\")  Wt 73.6 kg (162 lb 3.2 oz)  BMI 23.27 kg/m2  Body mass index is 23.27 kg/(m^2).    Gen- well, NAD, A+Ox3, normal color, fronto-temporal wasting  CVS- RRR  RS- CTA  Abd- distended, soft, non-tender, dull to percuss, no obvious organomegaly on palpation or percussion, BS+  Extr- hands normal, no BESSY  Skin- no rash or jaundice  Neuro- no asterixis  Psych- normal mood    Laboratory  Lab Results   Component Value Date     02/07/2018    POTASSIUM 3.8 02/07/2018    CHLORIDE 110 02/07/2018    CO2 23 02/07/2018    BUN 10 " 02/07/2018    CR 1.13 02/07/2018       Lab Results   Component Value Date    BILITOTAL 1.8 02/07/2018    ALT 14 02/07/2018    AST 37 02/07/2018    ALKPHOS 217 02/07/2018       Lab Results   Component Value Date    ALBUMIN 2.7 02/07/2018    PROTTOTAL 7.2 02/07/2018        Lab Results   Component Value Date    WBC 5.4 11/08/2017    HGB 8.9 11/08/2017    MCV 86 11/08/2017    PLT 92 12/19/2017       Lab Results   Component Value Date    INR 1.52 02/07/2018       Radiology  Nil new    Assessment  52 year old male who presents for follow-up of decompensated alcoholic cirrhosis complicated with ascites and history of variceal bleed in Oct 2017.  MELD-Na= 14 (stable).  Last ETOH= Oct 2017.  Will obtain paracentesis and increase diuretics to optimize ascites.  If there is any evidence of renal dysfunction, will consider TIPS placement which would also address ongoing issues with esophageal varices despite non-selective beta-blocker therapy.      Plan  1.  Therapeutic paracentesis  2.  Increase furosemide to 60mg PO Q24  3.  Increase spironolactone to 150mg PO Q24  4.  Check BMP next week  5.  EGD in 4-6 weeks  6.  Continue carvedilol  7.  Follow-up in 2 months    Gonzalo Bales MD  Hepatology  [facility]

## 2018-02-13 ENCOUNTER — CARE COORDINATION (OUTPATIENT)
Dept: GASTROENTEROLOGY | Facility: CLINIC | Age: 53
End: 2018-02-13

## 2018-02-13 DIAGNOSIS — K70.31 ALCOHOLIC CIRRHOSIS OF LIVER WITH ASCITES (H): ICD-10-CM

## 2018-02-13 PROCEDURE — 36415 COLL VENOUS BLD VENIPUNCTURE: CPT | Performed by: INTERNAL MEDICINE

## 2018-02-13 PROCEDURE — 80048 BASIC METABOLIC PNL TOTAL CA: CPT | Performed by: INTERNAL MEDICINE

## 2018-02-13 NOTE — PROGRESS NOTES
Patient is having lab recheck tomorrow at [facility]. He states since his last para and increased diuretic dosing, his weight has been stable and he has not noticed any lower extremity swelling or ascites build up. Will check in later this week to review lab results. Patient has no further questions or concerns at this time.

## 2018-02-14 LAB
ANION GAP SERPL CALCULATED.3IONS-SCNC: 7 MMOL/L (ref 3–14)
BUN SERPL-MCNC: 13 MG/DL (ref 7–30)
CALCIUM SERPL-MCNC: 8.2 MG/DL (ref 8.5–10.1)
CHLORIDE SERPL-SCNC: 107 MMOL/L (ref 94–109)
CO2 SERPL-SCNC: 23 MMOL/L (ref 20–32)
CREAT SERPL-MCNC: 1.14 MG/DL (ref 0.66–1.25)
GFR SERPL CREATININE-BSD FRML MDRD: 67 ML/MIN/1.7M2
GLUCOSE SERPL-MCNC: 56 MG/DL (ref 70–99)
POTASSIUM SERPL-SCNC: 3.4 MMOL/L (ref 3.4–5.3)
SODIUM SERPL-SCNC: 137 MMOL/L (ref 133–144)

## 2018-03-05 ENCOUNTER — TELEPHONE (OUTPATIENT)
Dept: GASTROENTEROLOGY | Facility: CLINIC | Age: 53
End: 2018-03-05

## 2018-03-05 NOTE — TELEPHONE ENCOUNTER
Patient scheduled for EGD    Indication for procedure. Variceal screening    Referring Provider. Dr. Bales    ? no    Arrival time verified? Yes, 8:45    Facility location verified? [address] [facility], [address]    Instructions given regarding prep and procedure    Prep Type npo for 6 hours    Are you taking any anticoagulants or blood thinners? no    Instructions given? Yes, verbally and written    Electronic implanted devices? no    Pre procedure teaching completed? Yes    Transportation from procedure? Yes, wife    H&P / Pre op physical completed? Na    Denise Bansal RN

## 2018-03-09 ENCOUNTER — SURGERY (OUTPATIENT)
Age: 53
End: 2018-03-09

## 2018-03-09 ENCOUNTER — HOSPITAL ENCOUNTER (OUTPATIENT)
Facility: CLINIC | Age: 53
Discharge: HOME | End: 2018-03-09
Attending: INTERNAL MEDICINE | Admitting: INTERNAL MEDICINE
Payer: COMMERCIAL

## 2018-03-09 VITALS
RESPIRATION RATE: 20 BRPM | DIASTOLIC BLOOD PRESSURE: 73 MMHG | SYSTOLIC BLOOD PRESSURE: 128 MMHG | OXYGEN SATURATION: 97 %

## 2018-03-09 DIAGNOSIS — I85.10 SECONDARY ESOPHAGEAL VARICES WITHOUT BLEEDING (H): ICD-10-CM

## 2018-03-09 DIAGNOSIS — K70.31 ALCOHOLIC CIRRHOSIS OF LIVER WITH ASCITES (H): ICD-10-CM

## 2018-03-09 LAB — UPPER GI ENDOSCOPY: NORMAL

## 2018-03-09 PROCEDURE — 25000125 ZZHC RX 250: Performed by: INTERNAL MEDICINE

## 2018-03-09 PROCEDURE — 43239 EGD BIOPSY SINGLE/MULTIPLE: CPT | Performed by: INTERNAL MEDICINE

## 2018-03-09 PROCEDURE — 25000132 ZZH RX MED GY IP 250 OP 250 PS 637: Performed by: INTERNAL MEDICINE

## 2018-03-09 PROCEDURE — G0500 MOD SEDAT ENDO SERVICE >5YRS: HCPCS | Performed by: INTERNAL MEDICINE

## 2018-03-09 PROCEDURE — 25000128 H RX IP 250 OP 636: Performed by: INTERNAL MEDICINE

## 2018-03-09 PROCEDURE — 40000556 ZZH STATISTIC PERIPHERAL IV START W US GUIDANCE

## 2018-03-09 PROCEDURE — 43244 EGD VARICES LIGATION: CPT | Performed by: INTERNAL MEDICINE

## 2018-03-09 PROCEDURE — 99152 MOD SED SAME PHYS/QHP 5/>YRS: CPT | Performed by: INTERNAL MEDICINE

## 2018-03-09 RX ORDER — SIMETHICONE
LIQUID (ML) MISCELLANEOUS PRN
Status: DISCONTINUED | OUTPATIENT
Start: 2018-03-09 | End: 2018-03-09 | Stop reason: HOSPADM

## 2018-03-09 RX ORDER — FENTANYL CITRATE 50 UG/ML
INJECTION, SOLUTION INTRAMUSCULAR; INTRAVENOUS PRN
Status: DISCONTINUED | OUTPATIENT
Start: 2018-03-09 | End: 2018-03-09 | Stop reason: HOSPADM

## 2018-03-09 RX ORDER — SODIUM CHLORIDE, SODIUM LACTATE, POTASSIUM CHLORIDE, CALCIUM CHLORIDE 600; 310; 30; 20 MG/100ML; MG/100ML; MG/100ML; MG/100ML
INJECTION, SOLUTION INTRAVENOUS CONTINUOUS
Status: DISCONTINUED | OUTPATIENT
Start: 2018-03-09 | End: 2018-03-09 | Stop reason: HOSPADM

## 2018-03-09 RX ORDER — CARVEDILOL 6.25 MG/1
6.25 TABLET ORAL 2 TIMES DAILY WITH MEALS
Qty: 180 TABLET | Refills: 1 | Status: SHIPPED | OUTPATIENT
Start: 2018-03-09 | End: 2018-07-02

## 2018-03-09 RX ORDER — ONDANSETRON 2 MG/ML
4 INJECTION INTRAMUSCULAR; INTRAVENOUS
Status: DISCONTINUED | OUTPATIENT
Start: 2018-03-09 | End: 2018-03-09 | Stop reason: HOSPADM

## 2018-03-09 RX ADMIN — MIDAZOLAM 1 MG: 1 INJECTION INTRAMUSCULAR; INTRAVENOUS at 10:20

## 2018-03-09 RX ADMIN — FENTANYL CITRATE 50 MCG: 50 INJECTION, SOLUTION INTRAMUSCULAR; INTRAVENOUS at 10:30

## 2018-03-09 RX ADMIN — FENTANYL CITRATE 50 MCG: 50 INJECTION, SOLUTION INTRAMUSCULAR; INTRAVENOUS at 10:21

## 2018-03-09 RX ADMIN — FENTANYL CITRATE 100 MCG: 50 INJECTION, SOLUTION INTRAMUSCULAR; INTRAVENOUS at 10:16

## 2018-03-09 RX ADMIN — Medication 2 ML: at 10:17

## 2018-03-09 RX ADMIN — BENZOCAINE 1 SPRAY: 220 SPRAY, METERED PERIODONTAL at 10:18

## 2018-03-09 RX ADMIN — MIDAZOLAM 2 MG: 1 INJECTION INTRAMUSCULAR; INTRAVENOUS at 10:15

## 2018-03-09 RX ADMIN — MIDAZOLAM 1 MG: 1 INJECTION INTRAMUSCULAR; INTRAVENOUS at 10:30

## 2018-03-09 NOTE — DISCHARGE INSTRUCTIONS
Discharge Instructions after  Upper Endoscopy (EGD)    Activity and Diet  You were given medicine for pain. You may be dizzy or sleepy.  For 24 hours:    Do not drive or use heavy equipment.    Do not make important decisions.    Do not drink any alcohol.      Discomfort  You may have a sore throat for 2 to 3 days. It may help to:    Avoid hot liquids for 24 hours.    Use sore throat lozenges.    Gargle as needed with salt water up to 4 times a day. Mix 1 cup of warm water  with 1 teaspoon of salt. Do not swallow.  __x_ Your esophagus was dilated (opened) or banded during the exam:    Drink only cool liquids for the rest of the day. Eat a soft diet for the next few days.    You may have a sore chest for 2 to 3 days.    You may take Tylenol (acetaminophen) for pain unless your doctor has told you not to.    Do not take aspirin or ibuprofen (Advil, Motrin) or other NSAIDS  (anti-inflammatory drugs) for _3__ days.    Follow-up per doctor orders    Other instructions________________________________________________________    When to call us:  Problems are rare. Call right away if you have:    Unusual throat pain or trouble swallowing    Unusual pain in belly or chest that is not relieved by belching or passing air    Black stools (tar-like looking bowel movement)    Temperature above 100.6  F. (37.5  C).    If you vomit blood or have severe pain, go to an emergency room.    If you have questions, call:  Monday to Friday, 7 a.m. to 4:30 p.m.: Endoscopy: [phone] (We may have to call you back)    After hours: Hospital: [phone] (Ask for the GI fellow on call)

## 2018-03-09 NOTE — IP AVS SNAPSHOT
MRN:[id number]                      After Visit Summary   3/9/2018    Ivan Bell    MRN: [id number]           Thank you!     Thank you for choosing [facility] for your care. Our goal is always to provide you with excellent care. Hearing back from our patients is one way we can continue to improve our services. Please take a few minutes to complete the written survey that you may receive in the mail after you visit with us. Thank you!        Patient Information     Date Of Birth          1965        About your hospital stay     You were admitted on:  March 9, 2018 You last received care in the:  [facility], Endoscopy    You were discharged on:  March 9, 2018       Who to Call     For medical emergencies, please call 911.  For non-urgent questions about your medical care, please call your primary care provider or clinic, [phone]  For questions related to your surgery, please call your surgery clinic        Attending Provider     Provider Specialty    Gonzalo Wahl MD Gastroenterology       Primary Care Provider Office Phone # Fax #    Rosette Wills [phone]      Your next 10 appointments already scheduled     Apr 10, 2018  7:00 AM CDT   Lab with  LAB   [facility] ([facility] [place])    [address]  1st Floor  [facility] [address] [phone]            Apr 10, 2018  8:00 AM CDT   (Arrive by 7:45 AM)   Return General Liver with Gonzalo Miramontes MD   [facility] ([facility] [place])    [address]  [address]  [facility] [address] [phone]              Further instructions from your care team       Discharge Instructions after  Upper Endoscopy (EGD)    Activity and Diet  You were given medicine for pain. You may be dizzy or sleepy.  For 24 hours:    Do not drive or use heavy equipment.    Do not make important decisions.    Do not drink any  alcohol.      Discomfort  You may have a sore throat for 2 to 3 days. It may help to:    Avoid hot liquids for 24 hours.    Use sore throat lozenges.    Gargle as needed with salt water up to 4 times a day. Mix 1 cup of warm water  with 1 teaspoon of salt. Do not swallow.  __x_ Your esophagus was dilated (opened) or banded during the exam:    Drink only cool liquids for the rest of the day. Eat a soft diet for the next few days.    You may have a sore chest for 2 to 3 days.    You may take Tylenol (acetaminophen) for pain unless your doctor has told you not to.    Do not take aspirin or ibuprofen (Advil, Motrin) or other NSAIDS  (anti-inflammatory drugs) for _3__ days.    Follow-up per doctor orders    Other instructions________________________________________________________    When to call us:  Problems are rare. Call right away if you have:    Unusual throat pain or trouble swallowing    Unusual pain in belly or chest that is not relieved by belching or passing air    Black stools (tar-like looking bowel movement)    Temperature above 100.6  F. (37.5  C).    If you vomit blood or have severe pain, go to an emergency room.    If you have questions, call:  Monday to Friday, 7 a.m. to 4:30 p.m.: Endoscopy: [phone] (We may have to call you back)    After hours: Hospital: [phone] (Ask for the GI fellow on call)    Pending Results     No orders found from 3/7/2018 to 3/10/2018.            Admission Information     Date & Time Provider Department Dept. Phone    3/9/2018 Gonzalo Wahl MD [facility] [place], Endoscopy [phone]      Your Vitals Were     Blood Pressure Respirations Pulse Oximetry             145/86 11 95%         MyChart Information     [employer] gives you secure access to your electronic health record. If you see a primary care provider, you can also send messages to your care team and make appointments. If you have questions, please call your primary care clinic.  If you do not have a  primary care provider, please call [phone] and they will assist you.        Care EveryWhere ID     This is your Care EveryWhere ID. This could be used by other organizations to access your [place] medical records  [id number]        Equal Access to Services     KAITLYN CAMACHO : Hadii aad ku hadtoohellen Sobud, wajadada luqadaha, qaybta kaalmada adeolesya, waxana bhupinder christianoyony romerosofiyatova molina. So [facility] [phone].    ATENCIÓN: Si habla español, tiene a lee disposición servicios gratuitos de asistencia lingüística. Llame al [phone].    We comply with applicable federal civil rights laws and [place] laws. We do not discriminate on the basis of race, color, national origin, age, disability, sex, sexual orientation, or gender identity.               Review of your medicines      UNREVIEWED medicines. Ask your doctor about these medicines        Dose / Directions    carvedilol 6.25 MG tablet   Commonly known as:  COREG   Used for:  Secondary esophageal varices without bleeding (H), Alcoholic cirrhosis of liver with ascites (H)        Dose:  6.25 mg   Take 1 tablet (6.25 mg) by mouth daily   Quantity:  60 tablet   Refills:  3       furosemide 20 MG tablet   Commonly known as:  LASIX   Used for:  Alcoholic cirrhosis of liver with ascites (H)        Dose:  60 mg   Take 3 tablets (60 mg) by mouth daily   Quantity:  120 tablet   Refills:  2       lactulose 10 GM/15ML solution   Commonly known as:  CHRONULAC        Dose:  15 g   Take 15 g by mouth 2 times daily   Refills:  0       MULTIPLE VITAMINS PO        Dose:  1 tablet   Take 1 tablet by mouth daily   Refills:  0       SILDENAFIL CITRATE PO        Dose:  10 mg   Take 10 mg by mouth daily   Refills:  0       spironolactone 100 MG tablet   Commonly known as:  ALDACTONE   Used for:  Alcoholic cirrhosis of liver with ascites (H)        Dose:  150 mg   Take 1.5 tablets (150 mg) by mouth daily   Quantity:  120 tablet   Refills:  2       TYLENOL PO        Take by  mouth every 4 hours as needed for mild pain or fever   Refills:  0                Protect others around you: Learn how to safely use, store and throw away your medicines at www.disposemymeds.org.             Medication List: This is a list of all your medications and when to take them. Check marks below indicate your daily home schedule. Keep this list as a reference.      Medications           Morning Afternoon Evening Bedtime As Needed    carvedilol 6.25 MG tablet   Commonly known as:  COREG   Take 1 tablet (6.25 mg) by mouth daily                                furosemide 20 MG tablet   Commonly known as:  LASIX   Take 3 tablets (60 mg) by mouth daily                                lactulose 10 GM/15ML solution   Commonly known as:  CHRONULAC   Take 15 g by mouth 2 times daily                                MULTIPLE VITAMINS PO   Take 1 tablet by mouth daily                                SILDENAFIL CITRATE PO   Take 10 mg by mouth daily                                spironolactone 100 MG tablet   Commonly known as:  ALDACTONE   Take 1.5 tablets (150 mg) by mouth daily                                TYLENOL PO   Take by mouth every 4 hours as needed for mild pain or fever

## 2018-03-09 NOTE — IP AVS SNAPSHOT
[facility] [place], Endoscopy    [address] [address]    Phone:  [phone]                                       After Visit Summary   3/9/2018    Ivan Bell    MRN: [id number]           After Visit Summary Signature Page     I have received my discharge instructions, and my questions have been answered. I have discussed any challenges I see with this plan with the nurse or doctor.    ..........................................................................................................................................  Patient/Patient Representative Signature      ..........................................................................................................................................  Patient Representative Print Name and Relationship to Patient    ..................................................               ................................................  Date                                            Time    ..........................................................................................................................................  Reviewed by Signature/Title    ...................................................              ..............................................  Date                                                            Time

## 2018-03-12 ENCOUNTER — HOSPITAL ENCOUNTER (OUTPATIENT)
Facility: CLINIC | Age: 53
End: 2018-03-12
Attending: INTERNAL MEDICINE | Admitting: INTERNAL MEDICINE

## 2018-03-22 ENCOUNTER — MYC MEDICAL ADVICE (OUTPATIENT)
Dept: GASTROENTEROLOGY | Facility: CLINIC | Age: 53
End: 2018-03-22

## 2018-03-22 DIAGNOSIS — K70.31 ALCOHOLIC CIRRHOSIS OF LIVER WITH ASCITES (H): Primary | ICD-10-CM

## 2018-03-22 DIAGNOSIS — K76.82 HEPATIC ENCEPHALOPATHY (H): ICD-10-CM

## 2018-03-22 RX ORDER — LACTULOSE 10 G/15ML
20 SOLUTION ORAL 3 TIMES DAILY PRN
Qty: 500 ML | Refills: 11 | Status: ON HOLD | OUTPATIENT
Start: 2018-03-22 | End: 2018-06-12

## 2018-04-06 ENCOUNTER — TELEPHONE (OUTPATIENT)
Dept: GASTROENTEROLOGY | Facility: CLINIC | Age: 53
End: 2018-04-06

## 2018-04-06 NOTE — TELEPHONE ENCOUNTER
Patient scheduled for EGD    Indication for procedure. H/o colonic polyps     Referring Provider. Rosette Wills MD    ? No     Arrival time verified? Patient instructed to arrive at 0800 am.     Facility location verified? [address] [place] [address], 5th floor     Instructions given regarding prep and procedure. Transportation policy reviewed; verbalized understanding.     Prep Type NPO 6-8 hours     Are you taking any anticoagulants or blood thinners? Denies     Instructions given? Yes     Electronic implanted devices? Denies     Pre procedure teaching completed? Yes    Transportation from procedure? Yes.     H&P / Pre op physical completed? N/A    Jerome Ramirez RN

## 2018-04-10 ENCOUNTER — OFFICE VISIT (OUTPATIENT)
Dept: GASTROENTEROLOGY | Facility: CLINIC | Age: 53
End: 2018-04-10
Attending: INTERNAL MEDICINE
Payer: COMMERCIAL

## 2018-04-10 VITALS
BODY MASS INDEX: 22.68 KG/M2 | WEIGHT: 158.4 LBS | TEMPERATURE: 98.3 F | HEIGHT: 70 IN | HEART RATE: 74 BPM | DIASTOLIC BLOOD PRESSURE: 71 MMHG | SYSTOLIC BLOOD PRESSURE: 118 MMHG

## 2018-04-10 DIAGNOSIS — K70.31 ALCOHOLIC CIRRHOSIS OF LIVER WITH ASCITES (H): Primary | ICD-10-CM

## 2018-04-10 DIAGNOSIS — K70.31 ALCOHOLIC CIRRHOSIS OF LIVER WITH ASCITES (H): ICD-10-CM

## 2018-04-10 LAB
ALBUMIN SERPL-MCNC: 2.9 G/DL (ref 3.4–5)
ALP SERPL-CCNC: 214 U/L (ref 40–150)
ALT SERPL W P-5'-P-CCNC: 16 U/L (ref 0–70)
ANION GAP SERPL CALCULATED.3IONS-SCNC: 8 MMOL/L (ref 3–14)
AST SERPL W P-5'-P-CCNC: 42 U/L (ref 0–45)
BILIRUB DIRECT SERPL-MCNC: 0.9 MG/DL (ref 0–0.2)
BILIRUB SERPL-MCNC: 1.8 MG/DL (ref 0.2–1.3)
BUN SERPL-MCNC: 14 MG/DL (ref 7–30)
CALCIUM SERPL-MCNC: 8.5 MG/DL (ref 8.5–10.1)
CHLORIDE SERPL-SCNC: 107 MMOL/L (ref 94–109)
CO2 SERPL-SCNC: 25 MMOL/L (ref 20–32)
CREAT SERPL-MCNC: 1.2 MG/DL (ref 0.66–1.25)
ERYTHROCYTE [DISTWIDTH] IN BLOOD BY AUTOMATED COUNT: 17.8 % (ref 10–15)
GFR SERPL CREATININE-BSD FRML MDRD: 63 ML/MIN/1.7M2
GLUCOSE SERPL-MCNC: 111 MG/DL (ref 70–99)
HCT VFR BLD AUTO: 32.2 % (ref 40–53)
HGB BLD-MCNC: 10.2 G/DL (ref 13.3–17.7)
INR PPP: 1.4 (ref 0.86–1.14)
MCH RBC QN AUTO: 28.4 PG (ref 26.5–33)
MCHC RBC AUTO-ENTMCNC: 31.7 G/DL (ref 31.5–36.5)
MCV RBC AUTO: 90 FL (ref 78–100)
PLATELET # BLD AUTO: 61 10E9/L (ref 150–450)
POTASSIUM SERPL-SCNC: 3.3 MMOL/L (ref 3.4–5.3)
PROT SERPL-MCNC: 7.2 G/DL (ref 6.8–8.8)
RBC # BLD AUTO: 3.59 10E12/L (ref 4.4–5.9)
SODIUM SERPL-SCNC: 139 MMOL/L (ref 133–144)
WBC # BLD AUTO: 4.1 10E9/L (ref 4–11)

## 2018-04-10 PROCEDURE — 80048 BASIC METABOLIC PNL TOTAL CA: CPT | Performed by: INTERNAL MEDICINE

## 2018-04-10 PROCEDURE — 85610 PROTHROMBIN TIME: CPT | Performed by: INTERNAL MEDICINE

## 2018-04-10 PROCEDURE — G0463 HOSPITAL OUTPT CLINIC VISIT: HCPCS | Mod: ZF

## 2018-04-10 PROCEDURE — 80076 HEPATIC FUNCTION PANEL: CPT | Performed by: INTERNAL MEDICINE

## 2018-04-10 PROCEDURE — 85027 COMPLETE CBC AUTOMATED: CPT | Performed by: INTERNAL MEDICINE

## 2018-04-10 PROCEDURE — 36415 COLL VENOUS BLD VENIPUNCTURE: CPT | Performed by: INTERNAL MEDICINE

## 2018-04-10 ASSESSMENT — PAIN SCALES - GENERAL: PAINLEVEL: NO PAIN (0)

## 2018-04-10 NOTE — PATIENT INSTRUCTIONS
Your weight is a little higher than before    There is a bit more of strain on your kidneys from the diuretic pills.    We will do some tests to see if a stent/shunt (TIPS) is feasible as it would help manage both the varices and the fluid.    This would reduce the need for medications for both varices and fluid.    The endoscopy findings on Friday will also help to influence what we do.    Gonzalo Bales MD  Hepatology  [facility]

## 2018-04-10 NOTE — PROGRESS NOTES
[facility]    Hepatology follow-up    Follow-up visit for cirrhosis    Subjective:  52 year old male     Cirrhosis  - ETOH  - hx ascites, HE  - hx variceal bleed Oct 2017  - last EGD Mar 2018- medium EV with bandingx6, mod portal hypertensive gastropathy  - HCC screening- Abd U/S Dec 2017    Patient comes to clinic this morning for follow-up of cirrhosis.  Last clinic visit was Feb 2018.  Since then, he has undergone repeat EGD with bandingx6.  Carvedilol dose was doubled in context of ongoing bandable esophageal varices.  He denies any new medications, ER visits or hospital admissions.    Patient is well today.  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  He denies lethargy and has not noticed any new symptoms since the increase in carvedilol.    Patient denies abdominal distension or discomfort.  He is taking his diuretics as prescribed.  He also denies lower extremity edema.    Patient is taking lactulose twice daily with 3-4 bowel movements per day.  He denies any confusion.    Patient denies jaundice, melena, hematemesis or hematochezia.    Patient denies fevers, sweats or chills.      Weight has increased 11lbs since last clinic visit.    Patient has not drank alcohol since Oct 2017.      Medical hx Surgical hx   Past Medical History:   Diagnosis Date     Ascites      Cirrhosis (H)      Esophageal varices (H)      Hepatitis      Hypertension      Left calcaneus fracture 1/9/2006     Portal vein thrombosis       Past Surgical History:   Procedure Laterality Date     ANKLE SURGERY Left      COLONOSCOPY N/A 3/31/2016    Procedure: COLONOSCOPY;  Surgeon: Rhys Uriostegui MD;  Location:  GI     ESOPHAGOSCOPY, GASTROSCOPY, DUODENOSCOPY (EGD), COMBINED N/A 3/31/2016    Procedure: COMBINED ESOPHAGOSCOPY, GASTROSCOPY, DUODENOSCOPY (EGD);  Surgeon: Rhys Uriostegui MD;  Location:  GI     ESOPHAGOSCOPY, GASTROSCOPY, DUODENOSCOPY (EGD), COMBINED  "N/A 3/9/2018    Procedure: COMBINED ESOPHAGOSCOPY, GASTROSCOPY, DUODENOSCOPY (EGD), BIOPSY SINGLE OR MULTIPLE;  EGD;  Surgeon: Gonzalo Wahl MD;  Location: [facility] GI     KNEE SURGERY Left      KNEE SURGERY Right      SIGMOIDOSCOPY FLEXIBLE N/A 10/31/2017    Procedure: SIGMOIDOSCOPY FLEXIBLE;;  Surgeon: Armaan Adams MD;  Location: [facility] GI          Medications  Prior to Admission medications    Medication Sig Start Date End Date Taking? Authorizing Provider   lactulose (CHRONULAC) 10 GM/15ML solution Take 30 mLs (20 g) by mouth 3 times daily as needed for constipation (Take as needed to maintain 3-4 bowel movements daily) 3/22/18  Yes Gonzalo Wahl MD   carvedilol (COREG) 6.25 MG tablet Take 1 tablet (6.25 mg) by mouth 2 times daily (with meals) 3/9/18  Yes Gonzalo Wahl MD   furosemide (LASIX) 20 MG tablet Take 3 tablets (60 mg) by mouth daily 2/7/18  Yes Gonzalo Wahl MD   spironolactone (ALDACTONE) 100 MG tablet Take 1.5 tablets (150 mg) by mouth daily 2/7/18  Yes Gonzalo Wahl MD   SILDENAFIL CITRATE PO Take 10 mg by mouth daily   Yes Unknown, Entered By History   Acetaminophen (TYLENOL PO) Take by mouth every 4 hours as needed for mild pain or fever   Yes Reported, Patient   MULTIPLE VITAMINS PO Take 1 tablet by mouth daily   Yes Reported, Patient       Allergies  Allergies   Allergen Reactions     Benadryl [Diphenhydramine] Other (See Comments)     Delirium (visual and auditory hallucinations)       Review of systems  A 10-point review of systems was negative    Examination  /71  Pulse 74  Temp 98.3  F (36.8  C) (Oral)  Ht 1.778 m (5' 10\")  Wt 71.8 kg (158 lb 6.4 oz)  BMI 22.73 kg/m2  Body mass index is 22.73 kg/(m^2).    Gen- well, NAD, A+Ox3, normal color  CVS- RRR  RS- CTA  Abd- non-distended, soft, non-tender, no ascites or organomegaly on palpation or percussion, BS+  Extr- hands normal, no BESSY  Skin- no rash or jaundice  Neuro- no asterixis  Psych- " normal mood    Laboratory  Lab Results   Component Value Date     04/10/2018    POTASSIUM 3.3 04/10/2018    CHLORIDE 107 04/10/2018    CO2 25 04/10/2018    BUN 14 04/10/2018    CR 1.20 04/10/2018       Lab Results   Component Value Date    BILITOTAL 1.8 04/10/2018    ALT 16 04/10/2018    AST 42 04/10/2018    ALKPHOS 214 04/10/2018       Lab Results   Component Value Date    ALBUMIN 2.9 04/10/2018    PROTTOTAL 7.2 04/10/2018        Lab Results   Component Value Date    WBC 4.1 04/10/2018    HGB 10.2 04/10/2018    MCV 90 04/10/2018    PLT 61 04/10/2018       Lab Results   Component Value Date    INR 1.40 04/10/2018       Radiology  CT A/P with contrast Oct 2017- left portal vein and anterior branch of right portal vein thrombosis    Assessment  52 year old male who presents for routine follow-up of decompensated alcoholic cirrhosis complicated with ascites, variceal bleed and history of hepatic encephalopathy.  MELD-Na= 14 (stable).  Last ETOH= Oct 2017.  Evidence of mild renal dysfunction on increased doses of diuretics although ascites is well-controlled.  In addition, esophageal varices still requiring intervention despite multiple banding sessions.  MELD-Na too low for consideration of liver transplant therefore will evaluate for TIPS for management of ascites and esophageal varices.  Will evaluate status of previously seen portal vein thrombus (6 months ago) with abdominal doppler U/S.  Hepatic encephalopathy is well-controlled on lactulose alone.  Due for follow-up endoscopy later this week.  Up to date with HCC screening.    Plan  1.  Continue abstinence from alcohol  2.  Low Na diet  3.  Continue carvedilol 6.25mg PO BID  4.  Continue furosemide 60mg PO Q24  5.  Continue spironolactone 150mg PO Q24  6.  Echocardiogram  7.  Abd U/S with doppler U/S  8.  EGD as scheduled  9.  Follow-up in 3 months    Gonzalo Bales MD  Hepatology  [facility]

## 2018-04-10 NOTE — NURSING NOTE
"Chief Complaint   Patient presents with     RECHECK     follow up with cirrhosis, tzimmer cma       Initial /71  Pulse 74  Temp 98.3  F (36.8  C) (Oral)  Ht 1.778 m (5' 10\")  Wt 71.8 kg (158 lb 6.4 oz)  BMI 22.73 kg/m2 Estimated body mass index is 22.73 kg/(m^2) as calculated from the following:    Height as of this encounter: 1.778 m (5' 10\").    Weight as of this encounter: 71.8 kg (158 lb 6.4 oz).  Medication Reconciliation: complete    "

## 2018-04-10 NOTE — MR AVS SNAPSHOT
After Visit Summary   4/10/2018    Ivan Bell    MRN: [id number]           Patient Information     Date Of Birth          1965        Visit Information        Provider Department      4/10/2018 8:00 AM Gonzalo Wahl MD [facility] Hepatology        Today's Diagnoses     Alcoholic cirrhosis of liver with ascites (H)    -  1      Care Instructions    Your weight is a little higher than before    There is a bit more of strain on your kidneys from the diuretic pills.    We will do some tests to see if a stent/shunt (TIPS) is feasible as it would help manage both the varices and the fluid.    This would reduce the need for medications for both varices and fluid.    The endoscopy findings on Friday will also help to influence what we do.    Gonzalo Bales MD  Hepatology  [facility]          Follow-ups after your visit        Follow-up notes from your care team     Return in about 3 months (around 7/10/2018).      Your next 10 appointments already scheduled     Apr 13, 2018   Procedure with Gonzalo Miramontes MD   [facility] ([facility])    [address] [place] [address]  [address] [place] [id number] [phone]           Located in the Clinics and Surgery Center at [address] [place] [address].   parking is very convenient and highly recommended.  is a $6 flat rate fee.  Both  and self parkers should enter the main arrival plaza from [facility]; parking attendants will direct you based on your parking preference.            Apr 26, 2018  9:45 AM CDT   Ech Complete with DONNY   [facility] Echocardiography ([facility])    [address] [address] [phone]           1. Please bring or wear a comfortable two-piece outfit. 2. You may eat, drink and take your normal medicines. 3. For any questions that cannot be answered, please contact the ordering  physician            Apr 26, 2018 11:00 AM CDT   US ABDOMEN/PELVIS DUPLEX COMPLETE with [id number]   [facility] ([facility])    [address] [address] [phone]           Please bring a list of your medicines (including vitamins, minerals and over-the-counter drugs). Also, tell your doctor about any allergies you may have. Wear comfortable clothes and leave your valuables at home.  Adults: No eating or drinking for 8 hours before the exam. You may take medicine with a small sip of water.  Children: - Children 6+ years: No food or drink for 6 hours before exam. - Children 1-5 years: No food or drink for 4 hours before exam. - Infants, breast-fed: may have breast milk up to 2 hours before exam. - Infants, formula: may have bottle until 4 hours before exam.  Please call the Imaging Department at your exam site with any questions.            Jul 23, 2018  8:00 AM CDT   Lab with  LAB   [facility] ([facility])    [address]  1st Floor  [facility] [address] [phone]            Jul 23, 2018  9:00 AM CDT   (Arrive by 8:45 AM)   Return General Liver with Gonzalo Miramontes MD   [facility] Hepatology ([facility])    [address] [place] [address]  [address]  [facility] [address] [phone]              Future tests that were ordered for you today     Open Future Orders        Priority Expected Expires Ordered    Echocardiogram Complete Routine  4/10/2019 4/10/2018    US Abdomen Complete w Doppler Complete Routine  5/10/2018 4/10/2018            Who to contact     If you have questions or need follow up information about today's clinic visit or your schedule please contact [facility] HEPATOLOGY directly at [phone].  Normal or non-critical lab and imaging results will be communicated to you by MyChart, letter or phone within 4 business days after the clinic has received the results. If you do not  "hear from us within 7 days, please contact the clinic through [employer] or phone. If you have a critical or abnormal lab result, we will notify you by phone as soon as possible.  Submit refill requests through [employer] or call your pharmacy and they will forward the refill request to us. Please allow 3 business days for your refill to be completed.          Additional Information About Your Visit        [employer] Information     [employer] gives you secure access to your electronic health record. If you see a primary care provider, you can also send messages to your care team and make appointments. If you have questions, please call your primary care clinic.  If you do not have a primary care provider, please call [phone] and they will assist you.        Care EveryWhere ID     This is your Care EveryWhere ID. This could be used by other organizations to access your [facility] medical records  [id number]        Your Vitals Were     Pulse Temperature Height BMI (Body Mass Index)          74 98.3  F (36.8  C) (Oral) 1.778 m (5' 10\") 22.73 kg/m2         Blood Pressure from Last 3 Encounters:   04/10/18 118/71   03/09/18 128/73   02/07/18 112/61    Weight from Last 3 Encounters:   04/10/18 71.8 kg (158 lb 6.4 oz)   02/07/18 67 kg (147 lb 12.8 oz)   02/07/18 73.6 kg (162 lb 3.2 oz)               Primary Care Provider Office Phone # Fax #    Rosette Maycol Wills [phone] [address] [place] [facility] [address]        Equal Access to Services     MYKE CAMACHO : Hadii linnea stewart hadtooo Sobud, waaxda luqadaha, qaybta kaalmada misha, jenny molina. So [facility] [phone].    ATENCIÓN: Si habla español, tiene a lee disposición servicios gratuitos de asistencia lingüística. Llame al [phone].    We comply with applicable federal civil rights laws and [place] laws. We do not discriminate on the basis of race, color, national origin, age, disability, sex, sexual orientation, or " gender identity.            Thank you!     Thank you for choosing [facility] HEPATOLOGY  for your care. Our goal is always to provide you with excellent care. Hearing back from our patients is one way we can continue to improve our services. Please take a few minutes to complete the written survey that you may receive in the mail after your visit with us. Thank you!             Your Updated Medication List - Protect others around you: Learn how to safely use, store and throw away your medicines at www.disposemymeds.org.          This list is accurate as of 4/10/18  8:58 AM.  Always use your most recent med list.                   Brand Name Dispense Instructions for use Diagnosis    carvedilol 6.25 MG tablet    COREG    180 tablet    Take 1 tablet (6.25 mg) by mouth 2 times daily (with meals)    Secondary esophageal varices without bleeding (H), Alcoholic cirrhosis of liver with ascites (H)       furosemide 20 MG tablet    LASIX    120 tablet    Take 3 tablets (60 mg) by mouth daily    Alcoholic cirrhosis of liver with ascites (H)       lactulose 10 GM/15ML solution    CHRONULAC    500 mL    Take 30 mLs (20 g) by mouth 3 times daily as needed for constipation (Take as needed to maintain 3-4 bowel movements daily)    Alcoholic cirrhosis of liver with ascites (H), Hepatic encephalopathy (H)       MULTIPLE VITAMINS PO      Take 1 tablet by mouth daily        SILDENAFIL CITRATE PO      Take 10 mg by mouth daily        spironolactone 100 MG tablet    ALDACTONE    120 tablet    Take 1.5 tablets (150 mg) by mouth daily    Alcoholic cirrhosis of liver with ascites (H)       TYLENOL PO      Take by mouth every 4 hours as needed for mild pain or fever

## 2018-04-10 NOTE — LETTER
4/10/2018      RE: Ivan Bell  [address] [address]       [facility]    Hepatology follow-up    Follow-up visit for cirrhosis    Subjective:  52 year old male     Cirrhosis  - ETOH  - hx ascites, HE  - hx variceal bleed Oct 2017  - last EGD Mar 2018- medium EV with bandingx6, mod portal hypertensive gastropathy  - HCC screening- Abd U/S Dec 2017    Patient comes to clinic this morning for follow-up of cirrhosis.  Last clinic visit was Feb 2018.  Since then, he has undergone repeat EGD with bandingx6.  Carvedilol dose was doubled in context of ongoing bandable esophageal varices.  He denies any new medications, ER visits or hospital admissions.    Patient is well today.  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  He denies lethargy and has not noticed any new symptoms since the increase in carvedilol.    Patient denies abdominal distension or discomfort.  He is taking his diuretics as prescribed.  He also denies lower extremity edema.    Patient is taking lactulose twice daily with 3-4 bowel movements per day.  He denies any confusion.    Patient denies jaundice, melena, hematemesis or hematochezia.    Patient denies fevers, sweats or chills.      Weight has increased 11lbs since last clinic visit.    Patient has not drank alcohol since Oct 2017.      Medical hx Surgical hx   Past Medical History:   Diagnosis Date     Ascites      Cirrhosis (H)      Esophageal varices (H)      Hepatitis      Hypertension      Left calcaneus fracture 1/9/2006     Portal vein thrombosis       Past Surgical History:   Procedure Laterality Date     ANKLE SURGERY Left      COLONOSCOPY N/A 3/31/2016    Procedure: COLONOSCOPY;  Surgeon: Rhys Uriostegui MD;  Location:  GI     ESOPHAGOSCOPY, GASTROSCOPY, DUODENOSCOPY (EGD), COMBINED N/A 3/31/2016    Procedure: COMBINED ESOPHAGOSCOPY, GASTROSCOPY, DUODENOSCOPY (EGD);  Surgeon: Rhys Uriostegui  "MD ALAN;  Location:  GI     ESOPHAGOSCOPY, GASTROSCOPY, DUODENOSCOPY (EGD), COMBINED N/A 3/9/2018    Procedure: COMBINED ESOPHAGOSCOPY, GASTROSCOPY, DUODENOSCOPY (EGD), BIOPSY SINGLE OR MULTIPLE;  EGD;  Surgeon: Gonzalo Wahl MD;  Location:  GI     KNEE SURGERY Left      KNEE SURGERY Right      SIGMOIDOSCOPY FLEXIBLE N/A 10/31/2017    Procedure: SIGMOIDOSCOPY FLEXIBLE;;  Surgeon: Armaan Adams MD;  Location:  GI          Medications  Prior to Admission medications    Medication Sig Start Date End Date Taking? Authorizing Provider   lactulose (CHRONULAC) 10 GM/15ML solution Take 30 mLs (20 g) by mouth 3 times daily as needed for constipation (Take as needed to maintain 3-4 bowel movements daily) 3/22/18  Yes Gonzalo Wahl MD   carvedilol (COREG) 6.25 MG tablet Take 1 tablet (6.25 mg) by mouth 2 times daily (with meals) 3/9/18  Yes Gonzalo Wahl MD   furosemide (LASIX) 20 MG tablet Take 3 tablets (60 mg) by mouth daily 2/7/18  Yes Gonzalo Wahl MD   spironolactone (ALDACTONE) 100 MG tablet Take 1.5 tablets (150 mg) by mouth daily 2/7/18  Yes Gonzalo Wahl MD   SILDENAFIL CITRATE PO Take 10 mg by mouth daily   Yes Unknown, Entered By History   Acetaminophen (TYLENOL PO) Take by mouth every 4 hours as needed for mild pain or fever   Yes Reported, Patient   MULTIPLE VITAMINS PO Take 1 tablet by mouth daily   Yes Reported, Patient       Allergies  Allergies   Allergen Reactions     Benadryl [Diphenhydramine] Other (See Comments)     Delirium (visual and auditory hallucinations)       Review of systems  A 10-point review of systems was negative    Examination  /71  Pulse 74  Temp 98.3  F (36.8  C) (Oral)  Ht 1.778 m (5' 10\")  Wt 71.8 kg (158 lb 6.4 oz)  BMI 22.73 kg/m2  Body mass index is 22.73 kg/(m^2).    Gen- well, NAD, A+Ox3, normal color  CVS- RRR  RS- CTA  Abd- non-distended, soft, non-tender, no ascites or organomegaly on palpation or percussion, " BS+  Extr- hands normal, no BESSY  Skin- no rash or jaundice  Neuro- no asterixis  Psych- normal mood    Laboratory  Lab Results   Component Value Date     04/10/2018    POTASSIUM 3.3 04/10/2018    CHLORIDE 107 04/10/2018    CO2 25 04/10/2018    BUN 14 04/10/2018    CR 1.20 04/10/2018       Lab Results   Component Value Date    BILITOTAL 1.8 04/10/2018    ALT 16 04/10/2018    AST 42 04/10/2018    ALKPHOS 214 04/10/2018       Lab Results   Component Value Date    ALBUMIN 2.9 04/10/2018    PROTTOTAL 7.2 04/10/2018        Lab Results   Component Value Date    WBC 4.1 04/10/2018    HGB 10.2 04/10/2018    MCV 90 04/10/2018    PLT 61 04/10/2018       Lab Results   Component Value Date    INR 1.40 04/10/2018       Radiology  CT A/P with contrast Oct 2017- left portal vein and anterior branch of right portal vein thrombosis    Assessment  52 year old male who presents for routine follow-up of decompensated alcoholic cirrhosis complicated with ascites, variceal bleed and history of hepatic encephalopathy.  MELD-Na= 14 (stable).  Last ETOH= Oct 2017.  Evidence of mild renal dysfunction on increased doses of diuretics although ascites is well-controlled.  In addition, esophageal varices still requiring intervention despite multiple banding sessions.  MELD-Na too low for consideration of liver transplant therefore will evaluate for TIPS for management of ascites and esophageal varices.  Will evaluate status of previously seen portal vein thrombus (6 months ago) with abdominal doppler U/S.  Hepatic encephalopathy is well-controlled on lactulose alone.  Due for follow-up endoscopy later this week.  Up to date with HCC screening.    Plan  1.  Continue abstinence from alcohol  2.  Low Na diet  3.  Continue carvedilol 6.25mg PO BID  4.  Continue furosemide 60mg PO Q24  5.  Continue spironolactone 150mg PO Q24  6.  Echocardiogram  7.  Abd U/S with doppler U/S  8.  EGD as scheduled  9.  Follow-up in 3 months    Gonzalo Bales  MD  Hepatology  [facility]

## 2018-04-13 ENCOUNTER — SURGERY (OUTPATIENT)
Age: 53
End: 2018-04-13

## 2018-04-13 ENCOUNTER — HOSPITAL ENCOUNTER (OUTPATIENT)
Facility: AMBULATORY SURGERY CENTER | Age: 53
End: 2018-04-13
Attending: INTERNAL MEDICINE
Payer: COMMERCIAL

## 2018-04-13 VITALS
OXYGEN SATURATION: 96 % | WEIGHT: 158.6 LBS | BODY MASS INDEX: 22.71 KG/M2 | SYSTOLIC BLOOD PRESSURE: 105 MMHG | RESPIRATION RATE: 18 BRPM | HEART RATE: 59 BPM | DIASTOLIC BLOOD PRESSURE: 60 MMHG | HEIGHT: 70 IN | TEMPERATURE: 97.9 F

## 2018-04-13 LAB — UPPER GI ENDOSCOPY: NORMAL

## 2018-04-13 RX ORDER — ONDANSETRON 2 MG/ML
4 INJECTION INTRAMUSCULAR; INTRAVENOUS
Status: DISCONTINUED | OUTPATIENT
Start: 2018-04-13 | End: 2018-04-14 | Stop reason: HOSPADM

## 2018-04-13 RX ORDER — SIMETHICONE
LIQUID (ML) MISCELLANEOUS PRN
Status: DISCONTINUED | OUTPATIENT
Start: 2018-04-13 | End: 2018-04-13 | Stop reason: HOSPADM

## 2018-04-13 RX ORDER — LIDOCAINE 40 MG/G
CREAM TOPICAL
Status: DISCONTINUED | OUTPATIENT
Start: 2018-04-13 | End: 2018-04-14 | Stop reason: HOSPADM

## 2018-04-13 RX ORDER — FENTANYL CITRATE 50 UG/ML
INJECTION, SOLUTION INTRAMUSCULAR; INTRAVENOUS PRN
Status: DISCONTINUED | OUTPATIENT
Start: 2018-04-13 | End: 2018-04-13 | Stop reason: HOSPADM

## 2018-04-13 RX ADMIN — Medication 2 ML: at 09:07

## 2018-04-13 RX ADMIN — FENTANYL CITRATE 50 MCG: 50 INJECTION, SOLUTION INTRAMUSCULAR; INTRAVENOUS at 09:49

## 2018-04-13 RX ADMIN — FENTANYL CITRATE 50 MCG: 50 INJECTION, SOLUTION INTRAMUSCULAR; INTRAVENOUS at 09:36

## 2018-04-13 RX ADMIN — FENTANYL CITRATE 100 MCG: 50 INJECTION, SOLUTION INTRAMUSCULAR; INTRAVENOUS at 09:32

## 2018-04-13 NOTE — OR NURSING
EGD under conscious sedation wearing 2lpm 02 via NC.  Pt on left side for exam, tolerated procedure.  4 bands placed using [employer] .

## 2018-04-26 ENCOUNTER — HOSPITAL ENCOUNTER (OUTPATIENT)
Dept: CARDIOLOGY | Facility: CLINIC | Age: 53
Discharge: HOME | End: 2018-04-26
Attending: INTERNAL MEDICINE | Admitting: INTERNAL MEDICINE
Payer: COMMERCIAL

## 2018-04-26 ENCOUNTER — HOSPITAL ENCOUNTER (OUTPATIENT)
Dept: ULTRASOUND IMAGING | Facility: CLINIC | Age: 53
End: 2018-04-26
Attending: INTERNAL MEDICINE
Payer: COMMERCIAL

## 2018-04-26 DIAGNOSIS — K70.31 ALCOHOLIC CIRRHOSIS OF LIVER WITH ASCITES (H): Primary | ICD-10-CM

## 2018-04-26 DIAGNOSIS — K70.31 ALCOHOLIC CIRRHOSIS OF LIVER WITH ASCITES (H): ICD-10-CM

## 2018-04-26 PROCEDURE — 93306 TTE W/DOPPLER COMPLETE: CPT | Mod: 26 | Performed by: INTERNAL MEDICINE

## 2018-04-26 PROCEDURE — 93306 TTE W/DOPPLER COMPLETE: CPT

## 2018-04-26 PROCEDURE — 76700 US EXAM ABDOM COMPLETE: CPT | Mod: XS

## 2018-05-07 ENCOUNTER — OFFICE VISIT (OUTPATIENT)
Dept: INTERVENTIONAL RADIOLOGY/VASCULAR | Facility: CLINIC | Age: 53
End: 2018-05-07
Payer: COMMERCIAL

## 2018-05-07 VITALS
SYSTOLIC BLOOD PRESSURE: 116 MMHG | OXYGEN SATURATION: 100 % | WEIGHT: 161 LBS | HEART RATE: 78 BPM | BODY MASS INDEX: 23.1 KG/M2 | DIASTOLIC BLOOD PRESSURE: 66 MMHG

## 2018-05-07 DIAGNOSIS — K70.31 ALCOHOLIC CIRRHOSIS OF LIVER WITH ASCITES (H): Primary | ICD-10-CM

## 2018-05-07 ASSESSMENT — ENCOUNTER SYMPTOMS
EYE IRRITATION: 0
EYE REDNESS: 0
BRUISES/BLEEDS EASILY: 1
EYE PAIN: 0
SWOLLEN GLANDS: 0
NAIL CHANGES: 0
EYE WATERING: 1
SKIN CHANGES: 0
POOR WOUND HEALING: 0
DOUBLE VISION: 0

## 2018-05-07 NOTE — PATIENT INSTRUCTIONS
We will call you with the appointment if your TIPS PLACEMENT.    On the day of the appointment, you will check  in 2 hours early to your scheduled procedure.     Please check into the 3rd floor, Pre-Op , located  at [facility], located at [address] [place] [address].     *please note that you will be given General Anesthesia sedation for this procedure.       Reminders:    -Nothing to eat or drink after midnight the night before.     -Please take your morning medications as indicated with enough water to swallow.     -Please also note that you will be going home after the procedure so therefore make sure that  You have a .       *We ask that you continue to take your medications and attend Paracentesis appointments as scheduled. We do not want you to discontinue your medications, especially if it is related to your liver disease.     Once the TIPS is placed, it will take a few weeks for it to function properly, so therefore attending Paracentesis appointments is important as you will notice that over time there will be less fluid removed.     **Should you experience any of the symptoms below please let us know.     1. Confusion- Hepatic encephalopathy. This is a dangerous symptom that can happen after the TIPS procedure. Please go immediately to the Emergency room    2. Swelling of lower legs- please notify us as soon as possible. This can happen afterwards as well. Please make sure to connect with your Hepatologist/Liver doctor for any diuretics.     3. Fever and abd pain-please call me as this may indicate an infection or so.      We will call you 2-3 days after you go home.     Follow up: We will see you at 1, 3 ,6, 12 months after TIPS placement for proper follow up with an Ultrasound to evaluate if the TIPS is still patent. Otherwise your Hepatologist will follow up with you.       Should you have any further questions, please call us anytime. It has been a pleasure to  see you today.        Denise Ramirez, RN, BSN  Interventional Radiology Nurse Coordinator   Phone: [phone]

## 2018-05-07 NOTE — MR AVS SNAPSHOT
After Visit Summary   5/7/2018    Ivan Bell    MRN: [id number]           Patient Information     Date Of Birth          1965        Visit Information        Provider Department      5/7/2018 1:15 PM Jelani Tristan MD [facility] Interventional Radiology        Care Instructions    We will call you with the appointment if your TIPS PLACEMENT.    On the day of the appointment, you will check  in 2 hours early to your scheduled procedure.     Please check into the 3rd floor, Pre-Op , located  at [facility], located at [address] [place] [address].     *please note that you will be given General Anesthesia sedation for this procedure.       Reminders:    -Nothing to eat or drink after midnight the night before.     -Please take your morning medications as indicated with enough water to swallow.     -Please also note that you will be going home after the procedure so therefore make sure that  You have a .       *We ask that you continue to take your medications and attend Paracentesis appointments as scheduled. We do not want you to discontinue your medications, especially if it is related to your liver disease.     Once the TIPS is placed, it will take a few weeks for it to function properly, so therefore attending Paracentesis appointments is important as you will notice that over time there will be less fluid removed.     **Should you experience any of the symptoms below please let us know.     1. Confusion- Hepatic encephalopathy. This is a dangerous symptom that can happen after the TIPS procedure. Please go immediately to the Emergency room    2. Swelling of lower legs- please notify us as soon as possible. This can happen afterwards as well. Please make sure to connect with your Hepatologist/Liver doctor for any diuretics.     3. Fever and abd pain-please call me as this may indicate an infection or so.      We will call you 2-3 days after you go  home.     Follow up: We will see you at 1, 3 ,6, 12 months after TIPS placement for proper follow up with an Ultrasound to evaluate if the TIPS is still patent. Otherwise your Hepatologist will follow up with you.       Should you have any further questions, please call us anytime. It has been a pleasure to see you today.        Denise Ramirez RN, BSN  Interventional Radiology Nurse Coordinator   Phone: [phone]            Follow-ups after your visit        Your next 10 appointments already scheduled     Jul 23, 2018  8:00 AM CDT   Lab with  LAB   [facility] ([facility])    [address]  1st Floor  [facility] [address] [phone]            Jul 23, 2018  9:00 AM CDT   (Arrive by 8:45 AM)   Return General Liver with Gonzalo Miramontes MD   [facility] Hepatology ([facility])    [address]  [address]  [facility] [address] [phone]              Who to contact     Please call your clinic at [phone] to:    Ask questions about your health    Make or cancel appointments    Discuss your medicines    Learn about your test results    Speak to your doctor            Additional Information About Your Visit        [employer] Information     [employer] gives you secure access to your electronic health record. If you see a primary care provider, you can also send messages to your care team and make appointments. If you have questions, please call your primary care clinic.  If you do not have a primary care provider, please call [phone] and they will assist you.      [employer] is an electronic gateway that provides easy, online access to your medical records. With [employer], you can request a clinic appointment, read your test results, renew a prescription or communicate with your care team.     To access your existing account, please contact your [facility] or call [phone] for assistance.        Care  EveryWhere ID     This is your Care EveryWhere ID. This could be used by other organizations to access your [facility] medical records  [id number]        Your Vitals Were     Pulse Pulse Oximetry BMI (Body Mass Index)             78 100% 23.1 kg/m2          Blood Pressure from Last 3 Encounters:   05/07/18 116/66   04/13/18 105/60   04/10/18 118/71    Weight from Last 3 Encounters:   05/07/18 73 kg (161 lb)   04/13/18 71.9 kg (158 lb 9.6 oz)   04/10/18 71.8 kg (158 lb 6.4 oz)              Today, you had the following     No orders found for display       Primary Care Provider Office Phone # Fax #    Rosette Wills [phone] [address] [place] [address]        Equal Access to Services     [facility]: Hadii aad pat hadasho Soomaali, waaxda luqadaha, qaybta kaalmada adeegyada, jenny roe hayeddie gaitan . So [facility] [phone].    ATENCIÓN: Si habla español, tiene a lee disposición servicios gratuitos de asistencia lingüística. [facility] [phone].    We comply with applicable federal civil rights laws and [place] laws. We do not discriminate on the basis of race, color, national origin, age, disability, sex, sexual orientation, or gender identity.            Thank you!     Thank you for choosing [facility] INTERVENTIONAL RADIOLOGY  for your care. Our goal is always to provide you with excellent care. Hearing back from our patients is one way we can continue to improve our services. Please take a few minutes to complete the written survey that you may receive in the mail after your visit with us. Thank you!             Your Updated Medication List - Protect others around you: Learn how to safely use, store and throw away your medicines at www.disposemymeds.org.          This list is accurate as of 5/7/18  1:48 PM.  Always use your most recent med list.                   Brand Name Dispense Instructions for use Diagnosis    carvedilol 6.25 MG tablet    COREG    180  tablet    Take 1 tablet (6.25 mg) by mouth 2 times daily (with meals)    Secondary esophageal varices without bleeding (H), Alcoholic cirrhosis of liver with ascites (H)       furosemide 20 MG tablet    LASIX    120 tablet    Take 3 tablets (60 mg) by mouth daily    Alcoholic cirrhosis of liver with ascites (H)       lactulose 10 GM/15ML solution    CHRONULAC    500 mL    Take 30 mLs (20 g) by mouth 3 times daily as needed for constipation (Take as needed to maintain 3-4 bowel movements daily)    Alcoholic cirrhosis of liver with ascites (H), Hepatic encephalopathy (H)       MULTIPLE VITAMINS PO      Take 1 tablet by mouth daily        SILDENAFIL CITRATE PO      Take 10 mg by mouth daily        spironolactone 100 MG tablet    ALDACTONE    120 tablet    Take 1.5 tablets (150 mg) by mouth daily    Alcoholic cirrhosis of liver with ascites (H)       TYLENOL PO      Take by mouth every 4 hours as needed for mild pain or fever

## 2018-05-07 NOTE — PROGRESS NOTES
INTERVENTIONAL RADIOLOGY CONSULTATION    Name: Ivan Bell  Age: 52 year old   Referring Physician: Dr. Nii Miramontes   REASON FOR REFERRAL: TIPS    HPI:   51 yo male with EtOH cirrhosis with ascites and variceal bleeding s/p multiple bandings. He has EGDs with Dr. Mcintyre which have shown progressive varices. He also has portal hypertensive gastropathy. He had a possible episode of HE currently under control by lactulose. He last had a paracentesis several months ago and has not needed another since adjusting his diuretics.    No episodes of hematemesis or melena. No confusion recently. Some weight loss. Hasn't had a drink in over 6 months.    PAST MEDICAL HISTORY:   Past Medical History:   Diagnosis Date     Ascites      Cirrhosis (H)      Esophageal varices (H)      Hepatitis      Hypertension      Left calcaneus fracture 1/9/2006 January 16, 2006: Fell 10 feet from ladder onto left foot on frozen ground on 1/9/06 at home.  Immediate pain and unable to walk- seen at [place] and diagnosed with calcaneus fracture     Portal vein thrombosis     left occlusion, partial main       PAST SURGICAL HISTORY:   Past Surgical History:   Procedure Laterality Date     ANKLE SURGERY Left      COLONOSCOPY N/A 3/31/2016    Procedure: COLONOSCOPY;  Surgeon: Rhys Uriostegui MD;  Location: [facility]     ESOPHAGOSCOPY, GASTROSCOPY, DUODENOSCOPY (EGD), COMBINED N/A 3/31/2016    Procedure: COMBINED ESOPHAGOSCOPY, GASTROSCOPY, DUODENOSCOPY (EGD);  Surgeon: Rhys Uriostegui MD;  Location:  GI     ESOPHAGOSCOPY, GASTROSCOPY, DUODENOSCOPY (EGD), COMBINED N/A 3/9/2018    Procedure: COMBINED ESOPHAGOSCOPY, GASTROSCOPY, DUODENOSCOPY (EGD), BIOPSY SINGLE OR MULTIPLE;  EGD;  Surgeon: Gonzalo Wahl MD;  Location:  GI     KNEE SURGERY Left      KNEE SURGERY Right      SIGMOIDOSCOPY FLEXIBLE N/A 10/31/2017    Procedure: SIGMOIDOSCOPY FLEXIBLE;;  Surgeon: Armaan Adams MD;  Location:  GI       FAMILY  HISTORY:   Family History   Problem Relation Age of Onset     Family History Negative Mother      Family History Negative Father      Hypertension Father      CEREBROVASCULAR DISEASE Father 87     Breast Cancer Maternal Grandmother      Rheumatoid Arthritis Daughter      Depression Daughter      Cancer - colorectal No family hx of      Prostate Cancer No family hx of      Liver Disease No family hx of        SOCIAL HISTORY:   Social History   Substance Use Topics     Smoking status: Former Smoker     Types: Dip, chew, snus or snuff     Quit date: 8/29/1998     Smokeless tobacco: Former User     Quit date: 10/24/2017      Comment: 1 tin per 10 days.     Alcohol use No      Comment: last etoh 2/14/16, did have Odouls ~8/2017     MEDICATIONS:   Prescription Medications as of 5/7/2018             Acetaminophen (TYLENOL PO) Take by mouth every 4 hours as needed for mild pain or fever    carvedilol (COREG) 6.25 MG tablet Take 1 tablet (6.25 mg) by mouth 2 times daily (with meals)    furosemide (LASIX) 20 MG tablet Take 3 tablets (60 mg) by mouth daily    lactulose (CHRONULAC) 10 GM/15ML solution Take 30 mLs (20 g) by mouth 3 times daily as needed for constipation (Take as needed to maintain 3-4 bowel movements daily)    MULTIPLE VITAMINS PO Take 1 tablet by mouth daily    SILDENAFIL CITRATE PO Take 10 mg by mouth daily    spironolactone (ALDACTONE) 100 MG tablet Take 1.5 tablets (150 mg) by mouth daily          ALLERGIES:   Benadryl [diphenhydramine]    Physical Examination:   Constitutional: healthy, alert and no distress  Head: No scleral icterus  Cardiovascular: RRR. No murmurs  Respiratory: CTA bilaterally.  Skin: No jaundice  Hematologic/Lymphatic/Immunologic: No LE swelling.    Labs:    BMP RESULTS:  Lab Results   Component Value Date     04/10/2018    POTASSIUM 3.3 (L) 04/10/2018    CHLORIDE 107 04/10/2018    CO2 25 04/10/2018    ANIONGAP 8 04/10/2018     (H) 04/10/2018    BUN 14 04/10/2018    CR 1.20  04/10/2018    GFRESTIMATED 63 04/10/2018    GFRESTBLACK 76 04/10/2018    JULISSA 8.5 04/10/2018        CBC RESULTS:  Lab Results   Component Value Date    WBC 4.1 04/10/2018    RBC 3.59 (L) 04/10/2018    HGB 10.2 (L) 04/10/2018    HCT 32.2 (L) 04/10/2018    MCV 90 04/10/2018    MCH 28.4 04/10/2018    MCHC 31.7 04/10/2018    RDW 17.8 (H) 04/10/2018    PLT 61 (L) 04/10/2018       INR/PTT:  Lab Results   Component Value Date    INR 1.40 (H) 04/10/2018    PTT 45 (H) 03/10/2016       Diagnostic studies:   10/30/17 CT and 4/26 US show a shrunken cirrhotic liver with nonocclusive PV thrombus.  The RPV does have some thrombus but is patent on ultrasound and probably amenable to tips.  The nonocclusive thrombus in the MPV is stable.    Assessment/Plan   52 year old male with EtOH cirrhosis leading to variceal bleeding and ascites. 4/10/18 MELD 14. Ascites controlled well with diuretics. No recent HE on lactulose.    Good candidate for TIPS placement. If the RPV is thrombosed and inaccessible, will perform DIPS.    I, Dr Jelani Tristan, was present with the fellow during the history and exam. I discussed the case with the fellow and agree with the findings as documented in the assessment and plan. I spent a total of 45 minutes with the patient.     Jatinder Blanc MD  Vascular and Interventional Radiology Fellow  [facility]  Patient Care Team:  Rosette Wills MD as PCP - General (Family Practice)  Devin Diego MD as MD (Family Practice)  Gonzalo Watkins MD as MD (Gastroenterology)  Vaishali Goff, RN as Nurse Coordinator (Hepatology)  GONZALO WATKINS

## 2018-05-07 NOTE — LETTER
5/7/2018       RE: Ivan Bell  [address] [id number]     Dear Colleague,    Thank you for referring your patient, Ivan Bell, to the [facility] INTERVENTIONAL RADIOLOGY at [facility]. Please see a copy of my visit note below.        INTERVENTIONAL RADIOLOGY CONSULTATION    Name: Ivan Bell  Age: 52 year old   Referring Physician: Dr. Nii Miramontes   REASON FOR REFERRAL: TIPS    HPI:   51 yo male with EtOH cirrhosis with ascites and variceal bleeding s/p multiple bandings. He has EGDs with Dr. Mcintyre which have shown progressive varices. He also has portal hypertensive gastropathy. He had a possible episode of HE currently under control by lactulose. He last had a paracentesis several months ago and has not needed another since adjusting his diuretics.    No episodes of hematemesis or melena. No confusion recently. Some weight loss. Hasn't had a drink in over 6 months.    PAST MEDICAL HISTORY:   Past Medical History:   Diagnosis Date     Ascites      Cirrhosis (H)      Esophageal varices (H)      Hepatitis      Hypertension      Left calcaneus fracture 1/9/2006 January 16, 2006: Fell 10 feet from ladder onto left foot on frozen ground on 1/9/06 at home.  Immediate pain and unable to walk- seen at [place] and diagnosed with calcaneus fracture     Portal vein thrombosis     left occlusion, partial main       PAST SURGICAL HISTORY:   Past Surgical History:   Procedure Laterality Date     ANKLE SURGERY Left      COLONOSCOPY N/A 3/31/2016    Procedure: COLONOSCOPY;  Surgeon: Rhys Uriostegui MD;  Location:  GI     ESOPHAGOSCOPY, GASTROSCOPY, DUODENOSCOPY (EGD), COMBINED N/A 3/31/2016    Procedure: COMBINED ESOPHAGOSCOPY, GASTROSCOPY, DUODENOSCOPY (EGD);  Surgeon: Rhys Uriostegui MD;  Location:  GI     ESOPHAGOSCOPY, GASTROSCOPY, DUODENOSCOPY (EGD), COMBINED N/A 3/9/2018    Procedure: COMBINED ESOPHAGOSCOPY, GASTROSCOPY,  DUODENOSCOPY (EGD), BIOPSY SINGLE OR MULTIPLE;  EGD;  Surgeon: Gonzalo Wahl MD;  Location:  GI     KNEE SURGERY Left      KNEE SURGERY Right      SIGMOIDOSCOPY FLEXIBLE N/A 10/31/2017    Procedure: SIGMOIDOSCOPY FLEXIBLE;;  Surgeon: Armaan Adams MD;  Location:  GI       FAMILY HISTORY:   Family History   Problem Relation Age of Onset     Family History Negative Mother      Family History Negative Father      Hypertension Father      CEREBROVASCULAR DISEASE Father 87     Breast Cancer Maternal Grandmother      Rheumatoid Arthritis Daughter      Depression Daughter      Cancer - colorectal No family hx of      Prostate Cancer No family hx of      Liver Disease No family hx of        SOCIAL HISTORY:   Social History   Substance Use Topics     Smoking status: Former Smoker     Types: Dip, chew, snus or snuff     Quit date: 8/29/1998     Smokeless tobacco: Former User     Quit date: 10/24/2017      Comment: 1 tin per 10 days.     Alcohol use No      Comment: last etoh 2/14/16, did have Odouls ~8/2017     MEDICATIONS:   Prescription Medications as of 5/7/2018             Acetaminophen (TYLENOL PO) Take by mouth every 4 hours as needed for mild pain or fever    carvedilol (COREG) 6.25 MG tablet Take 1 tablet (6.25 mg) by mouth 2 times daily (with meals)    furosemide (LASIX) 20 MG tablet Take 3 tablets (60 mg) by mouth daily    lactulose (CHRONULAC) 10 GM/15ML solution Take 30 mLs (20 g) by mouth 3 times daily as needed for constipation (Take as needed to maintain 3-4 bowel movements daily)    MULTIPLE VITAMINS PO Take 1 tablet by mouth daily    SILDENAFIL CITRATE PO Take 10 mg by mouth daily    spironolactone (ALDACTONE) 100 MG tablet Take 1.5 tablets (150 mg) by mouth daily          ALLERGIES:   Benadryl [diphenhydramine]    Physical Examination:   Constitutional: healthy, alert and no distress  Head: No scleral icterus  Cardiovascular: RRR. No murmurs  Respiratory: CTA bilaterally.  Skin: No  jaundice  Hematologic/Lymphatic/Immunologic: No LE swelling.    Labs:    BMP RESULTS:  Lab Results   Component Value Date     04/10/2018    POTASSIUM 3.3 (L) 04/10/2018    CHLORIDE 107 04/10/2018    CO2 25 04/10/2018    ANIONGAP 8 04/10/2018     (H) 04/10/2018    BUN 14 04/10/2018    CR 1.20 04/10/2018    GFRESTIMATED 63 04/10/2018    GFRESTBLACK 76 04/10/2018    JULISSA 8.5 04/10/2018        CBC RESULTS:  Lab Results   Component Value Date    WBC 4.1 04/10/2018    RBC 3.59 (L) 04/10/2018    HGB 10.2 (L) 04/10/2018    HCT 32.2 (L) 04/10/2018    MCV 90 04/10/2018    MCH 28.4 04/10/2018    MCHC 31.7 04/10/2018    RDW 17.8 (H) 04/10/2018    PLT 61 (L) 04/10/2018       INR/PTT:  Lab Results   Component Value Date    INR 1.40 (H) 04/10/2018    PTT 45 (H) 03/10/2016       Diagnostic studies:   10/30/17 CT and 4/26 US show a shrunken cirrhotic liver with nonocclusive PV thrombus.  The RPV does have some thrombus but is patent on ultrasound and probably amenable to tips.  The nonocclusive thrombus in the MPV is stable.    Assessment/Plan   52 year old male with EtOH cirrhosis leading to variceal bleeding and ascites. 4/10/18 MELD 14. Ascites controlled well with diuretics. No recent HE on lactulose.    Good candidate for TIPS placement. If the RPV is thrombosed and inaccessible, will perform DIPS.    I, Dr Jelani Tristan, was present with the fellow during the history and exam. I discussed the case with the fellow and agree with the findings as documented in the assessment and plan. I spent a total of 45 minutes with the patient.     Jatinder Blanc MD  Vascular and Interventional Radiology Fellow  [facility]  Patient Care Team:  Rosette Wills MD as PCP - General (Family Practice)  Devin Diego MD as MD (Family Practice)  Gonzalo Watkins MD as MD (Gastroenterology)  Vaishali Goff, RN as Nurse Coordinator (Hepatology)  GONZALO WATKINS, thank you for  allowing me to participate in the care of your patient.      Sincerely,    Jelani Tristan MD

## 2018-05-08 ENCOUNTER — TELEPHONE (OUTPATIENT)
Dept: INTERVENTIONAL RADIOLOGY/VASCULAR | Facility: CLINIC | Age: 53
End: 2018-05-08

## 2018-05-08 NOTE — TELEPHONE ENCOUNTER
Called and left a msg for pt to return my call regarding his TIPS procedure.   Inquired to see if he can call me back. Left direct number.     Denise Ramirez RN, BSN  Interventional Radiology Nurse Coordinator   Phone: [phone]

## 2018-05-11 ENCOUNTER — ANESTHESIA EVENT (OUTPATIENT)
Dept: SURGERY | Facility: CLINIC | Age: 53
End: 2018-05-11
Payer: COMMERCIAL

## 2018-05-13 NOTE — ANESTHESIA PREPROCEDURE EVALUATION
Anesthesia Evaluation     . Pt has had prior anesthetic. Type: MAC    No history of anesthetic complications          ROS/MED HX    ENT/Pulmonary:  - neg pulmonary ROS     Neurologic: Comment: H/o hepatic encephalopathy on lactulose      Cardiovascular:     (+) hypertension----. : . . . :. .       METS/Exercise Tolerance:     Hematologic:     (+) Other Hematologic Disorder-coagulopathic 2/2 liver disease (INR 1.4)      Musculoskeletal:  - neg musculoskeletal ROS       GI/Hepatic: Comment: EtOH cirrhosis with esophageal varices, non-occlusive portal vein thrombosis    (+) hepatitis liver disease,       Renal/Genitourinary:  - ROS Renal section negative       Endo:  - neg endo ROS       Psychiatric:  - neg psychiatric ROS       Infectious Disease:  - neg infectious disease ROS       Malignancy:      - no malignancy   Other:    - neg other ROS               ANESTHESIA PREOP EVALUATION    Procedure: Procedure(s):  Anesthesia Out of OR Transvenous Intrahepatic Systemic Shunt  - Wound Class:     HPI: Ivan Bell is a 52 year old male     PMHx/PSHx/ROS:  Past Medical History:   Diagnosis Date     Ascites      Cirrhosis (H)      Esophageal varices (H)      Hepatitis      Hypertension      Left calcaneus fracture 1/9/2006 January 16, 2006: Fell 10 feet from ladder onto left foot on frozen ground on 1/9/06 at home.  Immediate pain and unable to walk- seen at [place] and diagnosed with calcaneus fracture     Portal vein thrombosis     left occlusion, partial main       Past Surgical History:   Procedure Laterality Date     ANKLE SURGERY Left      COLONOSCOPY N/A 3/31/2016    Procedure: COLONOSCOPY;  Surgeon: Rhys Uriostegui MD;  Location:  GI     ESOPHAGOSCOPY, GASTROSCOPY, DUODENOSCOPY (EGD), COMBINED N/A 3/31/2016    Procedure: COMBINED ESOPHAGOSCOPY, GASTROSCOPY, DUODENOSCOPY (EGD);  Surgeon: Rhys Uriostegui MD;  Location:  GI     ESOPHAGOSCOPY, GASTROSCOPY, DUODENOSCOPY (EGD),  COMBINED N/A 3/9/2018    Procedure: COMBINED ESOPHAGOSCOPY, GASTROSCOPY, DUODENOSCOPY (EGD), BIOPSY SINGLE OR MULTIPLE;  EGD;  Surgeon: Gonzalo Wahl MD;  Location:  GI     KNEE SURGERY Left      KNEE SURGERY Right      SIGMOIDOSCOPY FLEXIBLE N/A 10/31/2017    Procedure: SIGMOIDOSCOPY FLEXIBLE;;  Surgeon: Armaan Adams MD;  Location:  GI         Past Anes Hx: No personal or family h/o anesthesia problems    Soc Hx:   Social History   Substance Use Topics     Smoking status: Former Smoker     Types: Dip, chew, snus or snuff     Quit date: 8/29/1998     Smokeless tobacco: Former User     Quit date: 10/24/2017      Comment: 1 tin per 10 days.     Alcohol use No      Comment: last etoh 2/14/16, did have Odouls ~8/2017       Allergies:   Allergies   Allergen Reactions     Benadryl [Diphenhydramine] Other (See Comments)     Delirium (visual and auditory hallucinations)       Meds:   No prescriptions prior to admission.       Current Outpatient Prescriptions   Medication Sig Dispense Refill     Acetaminophen (TYLENOL PO) Take by mouth every 4 hours as needed for mild pain or fever       carvedilol (COREG) 6.25 MG tablet Take 1 tablet (6.25 mg) by mouth 2 times daily (with meals) 180 tablet 1     furosemide (LASIX) 20 MG tablet Take 3 tablets (60 mg) by mouth daily 120 tablet 2     lactulose (CHRONULAC) 10 GM/15ML solution Take 30 mLs (20 g) by mouth 3 times daily as needed for constipation (Take as needed to maintain 3-4 bowel movements daily) 500 mL 11     MULTIPLE VITAMINS PO Take 1 tablet by mouth daily       SILDENAFIL CITRATE PO Take 10 mg by mouth daily       spironolactone (ALDACTONE) 100 MG tablet Take 1.5 tablets (150 mg) by mouth daily 120 tablet 2       Physical Exam:  VS: T Data Unavailable, P Data Unavailable, BP Data Unavailable, R Data Unavailable, SpO2  , Weight   Wt Readings from Last 2 Encounters:   05/07/18 73 kg (161 lb)   04/13/18 71.9 kg (158 lb 9.6 oz)         Labs:  UPT: No results  found for: HCGQUANT      BMP:  Recent Labs   Lab Test  04/10/18   0659   NA  139   POTASSIUM  3.3*   CHLORIDE  107   CO2  25   BUN  14   CR  1.20   GLC  111*   JULISSA  8.5     CBC:   Recent Labs   Lab Test  04/10/18   0659   WBC  4.1   RBC  3.59*   HGB  10.2*   HCT  32.2*   MCV  90   MCH  28.4   MCHC  31.7   RDW  17.8*   PLT  61*     Coags:  Recent Labs   Lab Test  04/10/18   0659   03/10/16   0610   INR  1.40*   < >  1.98*   PTT   --    --   45*   FIBR   --    --   181*    < > = values in this interval not displayed.       Assessment/Plan:  - ASA 3  - GETA with standard ASA monitors, IV induction, balanced anesthetic  - PIVx2  - Antibiotics per surgery  - PONV prophylaxis  - Blood products available, possible administration discussed with patient  - Relevant risks, benefits, alternatives and the anesthetic plan were discussed with patient/family or family representative.  All questions were answered and there was agreement to proceed.      Celina Hutchinson MD    5/13/2018  6:13 PM      Physical Exam  Normal systems: cardiovascular, pulmonary and dental    Airway   Mallampati: II  TM distance: >3 FB  Neck ROM: full    Dental     Cardiovascular   Rhythm and rate: regular and normal      Pulmonary    breath sounds clear to auscultation                    Anesthesia Plan      History & Physical Review  History and physical reviewed and following examination; no interval change.    ASA Status:  3 .    NPO Status:  > 2 hours    Plan for General with Intravenous induction. Maintenance will be Balanced.    PONV prophylaxis:  Ondansetron (or other 5HT-3)       Postoperative Care  Postoperative pain management:  Multi-modal analgesia.      Consents  Anesthetic plan, risks, benefits and alternatives discussed with:  Patient.  Use of blood products discussed: Yes.   Use of blood products discussed with Patient.  Consented to blood products.  .                          .

## 2018-05-14 ENCOUNTER — APPOINTMENT (OUTPATIENT)
Dept: INTERVENTIONAL RADIOLOGY/VASCULAR | Facility: CLINIC | Age: 53
End: 2018-05-14
Attending: RADIOLOGY
Payer: COMMERCIAL

## 2018-05-14 ENCOUNTER — ANESTHESIA (OUTPATIENT)
Dept: SURGERY | Facility: CLINIC | Age: 53
End: 2018-05-14
Payer: COMMERCIAL

## 2018-05-14 ENCOUNTER — HOSPITAL ENCOUNTER (OUTPATIENT)
Facility: CLINIC | Age: 53
Discharge: HOME | End: 2018-05-14
Attending: RADIOLOGY | Admitting: RADIOLOGY
Payer: COMMERCIAL

## 2018-05-14 ENCOUNTER — SURGERY (OUTPATIENT)
Age: 53
End: 2018-05-14

## 2018-05-14 VITALS
TEMPERATURE: 97.5 F | SYSTOLIC BLOOD PRESSURE: 107 MMHG | RESPIRATION RATE: 16 BRPM | HEART RATE: 76 BPM | OXYGEN SATURATION: 99 % | BODY MASS INDEX: 23.67 KG/M2 | HEIGHT: 70 IN | WEIGHT: 165.34 LBS | DIASTOLIC BLOOD PRESSURE: 70 MMHG

## 2018-05-14 DIAGNOSIS — K70.31 ALCOHOLIC CIRRHOSIS OF LIVER WITH ASCITES (H): ICD-10-CM

## 2018-05-14 LAB
ABO + RH BLD: NORMAL
ABO + RH BLD: NORMAL
ALBUMIN SERPL-MCNC: 2.8 G/DL (ref 3.4–5)
ALP SERPL-CCNC: 187 U/L (ref 40–150)
ALT SERPL W P-5'-P-CCNC: 19 U/L (ref 0–70)
ANION GAP SERPL CALCULATED.3IONS-SCNC: 10 MMOL/L (ref 3–14)
APTT PPP: 29 SEC (ref 22–37)
AST SERPL W P-5'-P-CCNC: 44 U/L (ref 0–45)
BASOPHILS # BLD AUTO: 0 10E9/L (ref 0–0.2)
BASOPHILS NFR BLD AUTO: 0.4 %
BILIRUB SERPL-MCNC: 2.1 MG/DL (ref 0.2–1.3)
BLD GP AB SCN SERPL QL: NORMAL
BLD PROD TYP BPU: NORMAL
BLOOD BANK CMNT PATIENT-IMP: NORMAL
BUN SERPL-MCNC: 11 MG/DL (ref 7–30)
CALCIUM SERPL-MCNC: 8.5 MG/DL (ref 8.5–10.1)
CHLORIDE SERPL-SCNC: 108 MMOL/L (ref 94–109)
CO2 SERPL-SCNC: 22 MMOL/L (ref 20–32)
CREAT SERPL-MCNC: 1.02 MG/DL (ref 0.66–1.25)
DIFFERENTIAL METHOD BLD: ABNORMAL
EOSINOPHIL # BLD AUTO: 0.3 10E9/L (ref 0–0.7)
EOSINOPHIL NFR BLD AUTO: 10.5 %
ERYTHROCYTE [DISTWIDTH] IN BLOOD BY AUTOMATED COUNT: 18.5 % (ref 10–15)
ERYTHROCYTE [DISTWIDTH] IN BLOOD BY AUTOMATED COUNT: 18.5 % (ref 10–15)
GFR SERPL CREATININE-BSD FRML MDRD: 76 ML/MIN/1.7M2
GLUCOSE BLDC GLUCOMTR-MCNC: 85 MG/DL (ref 70–99)
GLUCOSE SERPL-MCNC: 92 MG/DL (ref 70–99)
HCT VFR BLD AUTO: 28.3 % (ref 40–53)
HCT VFR BLD AUTO: 32.6 % (ref 40–53)
HGB BLD-MCNC: 10.2 G/DL (ref 13.3–17.7)
HGB BLD-MCNC: 9 G/DL (ref 13.3–17.7)
IMM GRANULOCYTES # BLD: 0 10E9/L (ref 0–0.4)
IMM GRANULOCYTES NFR BLD: 0 %
INR PPP: 1.33 (ref 0.86–1.14)
LYMPHOCYTES # BLD AUTO: 0.6 10E9/L (ref 0.8–5.3)
LYMPHOCYTES NFR BLD AUTO: 24.6 %
MCH RBC QN AUTO: 28.2 PG (ref 26.5–33)
MCH RBC QN AUTO: 28.6 PG (ref 26.5–33)
MCHC RBC AUTO-ENTMCNC: 31.3 G/DL (ref 31.5–36.5)
MCHC RBC AUTO-ENTMCNC: 31.8 G/DL (ref 31.5–36.5)
MCV RBC AUTO: 90 FL (ref 78–100)
MCV RBC AUTO: 90 FL (ref 78–100)
MONOCYTES # BLD AUTO: 0.3 10E9/L (ref 0–1.3)
MONOCYTES NFR BLD AUTO: 12.9 %
NEUTROPHILS # BLD AUTO: 1.3 10E9/L (ref 1.6–8.3)
NEUTROPHILS NFR BLD AUTO: 51.6 %
NRBC # BLD AUTO: 0 10*3/UL
NRBC BLD AUTO-RTO: 0 /100
NUM BPU REQUESTED: 2
PLATELET # BLD AUTO: 44 10E9/L (ref 150–450)
PLATELET # BLD AUTO: 50 10E9/L (ref 150–450)
POTASSIUM SERPL-SCNC: 3.6 MMOL/L (ref 3.4–5.3)
PROT SERPL-MCNC: 6.8 G/DL (ref 6.8–8.8)
RBC # BLD AUTO: 3.15 10E12/L (ref 4.4–5.9)
RBC # BLD AUTO: 3.62 10E12/L (ref 4.4–5.9)
SODIUM SERPL-SCNC: 140 MMOL/L (ref 133–144)
SPECIMEN EXP DATE BLD: NORMAL
WBC # BLD AUTO: 2.5 10E9/L (ref 4–11)
WBC # BLD AUTO: 2.7 10E9/L (ref 4–11)

## 2018-05-14 PROCEDURE — C1769 GUIDE WIRE: HCPCS

## 2018-05-14 PROCEDURE — 37000009 ZZH ANESTHESIA TECHNICAL FEE, EACH ADDTL 15 MIN

## 2018-05-14 PROCEDURE — 25000565 ZZH ISOFLURANE, EA 15 MIN

## 2018-05-14 PROCEDURE — 86901 BLOOD TYPING SEROLOGIC RH(D): CPT | Performed by: RADIOLOGY

## 2018-05-14 PROCEDURE — 27211039 ZZH NEEDLE CR2

## 2018-05-14 PROCEDURE — C9399 UNCLASSIFIED DRUGS OR BIOLOG: HCPCS | Performed by: NURSE ANESTHETIST, CERTIFIED REGISTERED

## 2018-05-14 PROCEDURE — 27210732 ZZH ACCESSORY CR1

## 2018-05-14 PROCEDURE — 76705 ECHO EXAM OF ABDOMEN: CPT

## 2018-05-14 PROCEDURE — 86923 COMPATIBILITY TEST ELECTRIC: CPT | Performed by: RADIOLOGY

## 2018-05-14 PROCEDURE — 25000125 ZZHC RX 250: Performed by: NURSE ANESTHETIST, CERTIFIED REGISTERED

## 2018-05-14 PROCEDURE — 40000170 ZZH STATISTIC PRE-PROCEDURE ASSESSMENT II

## 2018-05-14 PROCEDURE — 25000125 ZZHC RX 250: Performed by: RADIOLOGY

## 2018-05-14 PROCEDURE — 00000146 ZZHCL STATISTIC GLUCOSE BY METER IP

## 2018-05-14 PROCEDURE — 27210908 ZZH NEEDLE CR4

## 2018-05-14 PROCEDURE — 40000275 ZZH STATISTIC RCP TIME EA 10 MIN

## 2018-05-14 PROCEDURE — 27210911 ZZH NEEDLE CR7

## 2018-05-14 PROCEDURE — 71000015 ZZH RECOVERY PHASE 1 LEVEL 2 EA ADDTL HR

## 2018-05-14 PROCEDURE — 71000027 ZZH RECOVERY PHASE 2 EACH 15 MINS

## 2018-05-14 PROCEDURE — 27210903 ZZH KIT CR5

## 2018-05-14 PROCEDURE — 36481 INSERTION OF CATHETER VEIN: CPT

## 2018-05-14 PROCEDURE — 37182 INSERT HEPATIC SHUNT (TIPS): CPT | Mod: 74

## 2018-05-14 PROCEDURE — 85025 COMPLETE CBC W/AUTO DIFF WBC: CPT | Performed by: RADIOLOGY

## 2018-05-14 PROCEDURE — C1887 CATHETER, GUIDING: HCPCS

## 2018-05-14 PROCEDURE — 75887 VEIN X-RAY LIVER W/O HEMODYN: CPT

## 2018-05-14 PROCEDURE — 27210845 ZZH DEVICE INFLATION CR5

## 2018-05-14 PROCEDURE — 71000014 ZZH RECOVERY PHASE 1 LEVEL 2 FIRST HR

## 2018-05-14 PROCEDURE — 80053 COMPREHEN METABOLIC PANEL: CPT | Performed by: RADIOLOGY

## 2018-05-14 PROCEDURE — 86850 RBC ANTIBODY SCREEN: CPT | Performed by: RADIOLOGY

## 2018-05-14 PROCEDURE — 85610 PROTHROMBIN TIME: CPT | Performed by: RADIOLOGY

## 2018-05-14 PROCEDURE — 25000128 H RX IP 250 OP 636: Performed by: NURSE ANESTHETIST, CERTIFIED REGISTERED

## 2018-05-14 PROCEDURE — 27210905 ZZH KIT CR7

## 2018-05-14 PROCEDURE — 25000132 ZZH RX MED GY IP 250 OP 250 PS 637: Performed by: ANESTHESIOLOGY

## 2018-05-14 PROCEDURE — 27210780 ZZH KIT CR3

## 2018-05-14 PROCEDURE — 85730 THROMBOPLASTIN TIME PARTIAL: CPT | Performed by: RADIOLOGY

## 2018-05-14 PROCEDURE — 40000014 ZZH STATISTIC ARTERIAL MONITORING DAILY

## 2018-05-14 PROCEDURE — 25000128 H RX IP 250 OP 636: Performed by: RADIOLOGY

## 2018-05-14 PROCEDURE — 36415 COLL VENOUS BLD VENIPUNCTURE: CPT | Performed by: RADIOLOGY

## 2018-05-14 PROCEDURE — 86900 BLOOD TYPING SEROLOGIC ABO: CPT | Performed by: RADIOLOGY

## 2018-05-14 PROCEDURE — 37000008 ZZH ANESTHESIA TECHNICAL FEE, 1ST 30 MIN

## 2018-05-14 PROCEDURE — 85027 COMPLETE CBC AUTOMATED: CPT | Performed by: RADIOLOGY

## 2018-05-14 RX ORDER — LIDOCAINE 40 MG/G
CREAM TOPICAL
Status: DISCONTINUED | OUTPATIENT
Start: 2018-05-14 | End: 2018-05-14 | Stop reason: HOSPADM

## 2018-05-14 RX ORDER — ACETAMINOPHEN 325 MG/1
975 TABLET ORAL ONCE
Status: DISCONTINUED | OUTPATIENT
Start: 2018-05-14 | End: 2018-05-14

## 2018-05-14 RX ORDER — LABETALOL HYDROCHLORIDE 5 MG/ML
10 INJECTION, SOLUTION INTRAVENOUS
Status: DISCONTINUED | OUTPATIENT
Start: 2018-05-14 | End: 2018-05-14 | Stop reason: HOSPADM

## 2018-05-14 RX ORDER — ONDANSETRON 4 MG/1
4 TABLET, ORALLY DISINTEGRATING ORAL EVERY 30 MIN PRN
Status: DISCONTINUED | OUTPATIENT
Start: 2018-05-14 | End: 2018-05-14 | Stop reason: HOSPADM

## 2018-05-14 RX ORDER — DEXTROSE MONOHYDRATE 25 G/50ML
25-50 INJECTION, SOLUTION INTRAVENOUS
Status: DISCONTINUED | OUTPATIENT
Start: 2018-05-14 | End: 2018-05-14 | Stop reason: HOSPADM

## 2018-05-14 RX ORDER — ONDANSETRON 2 MG/ML
4 INJECTION INTRAMUSCULAR; INTRAVENOUS EVERY 30 MIN PRN
Status: DISCONTINUED | OUTPATIENT
Start: 2018-05-14 | End: 2018-05-14 | Stop reason: HOSPADM

## 2018-05-14 RX ORDER — PROPOFOL 10 MG/ML
INJECTION, EMULSION INTRAVENOUS PRN
Status: DISCONTINUED | OUTPATIENT
Start: 2018-05-14 | End: 2018-05-14

## 2018-05-14 RX ORDER — NICOTINE POLACRILEX 4 MG
15-30 LOZENGE BUCCAL
Status: DISCONTINUED | OUTPATIENT
Start: 2018-05-14 | End: 2018-05-14 | Stop reason: HOSPADM

## 2018-05-14 RX ORDER — ACETAMINOPHEN 500 MG
500 TABLET ORAL EVERY 6 HOURS PRN
Status: DISCONTINUED | OUTPATIENT
Start: 2018-05-14 | End: 2018-05-14 | Stop reason: HOSPADM

## 2018-05-14 RX ORDER — ONDANSETRON 2 MG/ML
INJECTION INTRAMUSCULAR; INTRAVENOUS PRN
Status: DISCONTINUED | OUTPATIENT
Start: 2018-05-14 | End: 2018-05-14

## 2018-05-14 RX ORDER — IODIXANOL 320 MG/ML
200 INJECTION, SOLUTION INTRAVASCULAR ONCE
Status: COMPLETED | OUTPATIENT
Start: 2018-05-14 | End: 2018-05-14

## 2018-05-14 RX ORDER — FENTANYL CITRATE 50 UG/ML
INJECTION, SOLUTION INTRAMUSCULAR; INTRAVENOUS PRN
Status: DISCONTINUED | OUTPATIENT
Start: 2018-05-14 | End: 2018-05-14

## 2018-05-14 RX ORDER — ACETAMINOPHEN 325 MG/1
975 TABLET ORAL ONCE
Status: COMPLETED | OUTPATIENT
Start: 2018-05-14 | End: 2018-05-14

## 2018-05-14 RX ORDER — SODIUM CHLORIDE, SODIUM LACTATE, POTASSIUM CHLORIDE, CALCIUM CHLORIDE 600; 310; 30; 20 MG/100ML; MG/100ML; MG/100ML; MG/100ML
INJECTION, SOLUTION INTRAVENOUS CONTINUOUS
Status: DISCONTINUED | OUTPATIENT
Start: 2018-05-14 | End: 2018-05-14 | Stop reason: HOSPADM

## 2018-05-14 RX ORDER — SODIUM CHLORIDE, SODIUM LACTATE, POTASSIUM CHLORIDE, CALCIUM CHLORIDE 600; 310; 30; 20 MG/100ML; MG/100ML; MG/100ML; MG/100ML
INJECTION, SOLUTION INTRAVENOUS CONTINUOUS PRN
Status: DISCONTINUED | OUTPATIENT
Start: 2018-05-14 | End: 2018-05-14

## 2018-05-14 RX ORDER — PROPOFOL 10 MG/ML
INJECTION, EMULSION INTRAVENOUS CONTINUOUS PRN
Status: DISCONTINUED | OUTPATIENT
Start: 2018-05-14 | End: 2018-05-14

## 2018-05-14 RX ORDER — NALOXONE HYDROCHLORIDE 0.4 MG/ML
.1-.4 INJECTION, SOLUTION INTRAMUSCULAR; INTRAVENOUS; SUBCUTANEOUS
Status: DISCONTINUED | OUTPATIENT
Start: 2018-05-14 | End: 2018-05-14 | Stop reason: HOSPADM

## 2018-05-14 RX ORDER — MEPERIDINE HYDROCHLORIDE 25 MG/ML
12.5 INJECTION INTRAMUSCULAR; INTRAVENOUS; SUBCUTANEOUS
Status: DISCONTINUED | OUTPATIENT
Start: 2018-05-14 | End: 2018-05-14 | Stop reason: HOSPADM

## 2018-05-14 RX ORDER — AMPICILLIN AND SULBACTAM 2; 1 G/1; G/1
3 INJECTION, POWDER, FOR SOLUTION INTRAMUSCULAR; INTRAVENOUS
Status: COMPLETED | OUTPATIENT
Start: 2018-05-14 | End: 2018-05-14

## 2018-05-14 RX ORDER — SODIUM CHLORIDE 9 MG/ML
INJECTION, SOLUTION INTRAVENOUS CONTINUOUS
Status: DISCONTINUED | OUTPATIENT
Start: 2018-05-14 | End: 2018-05-14 | Stop reason: HOSPADM

## 2018-05-14 RX ORDER — FENTANYL CITRATE 50 UG/ML
25-50 INJECTION, SOLUTION INTRAMUSCULAR; INTRAVENOUS
Status: DISCONTINUED | OUTPATIENT
Start: 2018-05-14 | End: 2018-05-14 | Stop reason: HOSPADM

## 2018-05-14 RX ADMIN — ONDANSETRON 4 MG: 2 INJECTION INTRAMUSCULAR; INTRAVENOUS at 10:59

## 2018-05-14 RX ADMIN — MIDAZOLAM 2 MG: 1 INJECTION INTRAMUSCULAR; INTRAVENOUS at 08:07

## 2018-05-14 RX ADMIN — SODIUM CHLORIDE, POTASSIUM CHLORIDE, SODIUM LACTATE AND CALCIUM CHLORIDE: 600; 310; 30; 20 INJECTION, SOLUTION INTRAVENOUS at 07:56

## 2018-05-14 RX ADMIN — AMPICILLIN SODIUM AND SULBACTAM SODIUM 3 G: 2; 1 INJECTION, POWDER, FOR SOLUTION INTRAMUSCULAR; INTRAVENOUS at 08:55

## 2018-05-14 RX ADMIN — FENTANYL CITRATE 50 MCG: 50 INJECTION, SOLUTION INTRAMUSCULAR; INTRAVENOUS at 10:34

## 2018-05-14 RX ADMIN — IODIXANOL 85 ML: 320 INJECTION, SOLUTION INTRAVASCULAR at 11:16

## 2018-05-14 RX ADMIN — PROPOFOL 180 MG: 10 INJECTION, EMULSION INTRAVENOUS at 08:44

## 2018-05-14 RX ADMIN — ROCURONIUM BROMIDE 10 MG: 10 INJECTION INTRAVENOUS at 10:37

## 2018-05-14 RX ADMIN — SUGAMMADEX 200 MG: 100 INJECTION, SOLUTION INTRAVENOUS at 10:59

## 2018-05-14 RX ADMIN — LIDOCAINE HYDROCHLORIDE 15 ML: 10 INJECTION, SOLUTION EPIDURAL; INFILTRATION; INTRACAUDAL; PERINEURAL at 11:21

## 2018-05-14 RX ADMIN — FENTANYL CITRATE 25 MCG: 50 INJECTION, SOLUTION INTRAMUSCULAR; INTRAVENOUS at 09:45

## 2018-05-14 RX ADMIN — ROCURONIUM BROMIDE 10 MG: 10 INJECTION INTRAVENOUS at 09:45

## 2018-05-14 RX ADMIN — ROCURONIUM BROMIDE 40 MG: 10 INJECTION INTRAVENOUS at 08:44

## 2018-05-14 RX ADMIN — FENTANYL CITRATE 50 MCG: 50 INJECTION, SOLUTION INTRAMUSCULAR; INTRAVENOUS at 08:32

## 2018-05-14 RX ADMIN — PROPOFOL 75 MCG/KG/MIN: 10 INJECTION, EMULSION INTRAVENOUS at 11:12

## 2018-05-14 RX ADMIN — ROCURONIUM BROMIDE 10 MG: 10 INJECTION INTRAVENOUS at 09:15

## 2018-05-14 RX ADMIN — FENTANYL CITRATE 50 MCG: 50 INJECTION, SOLUTION INTRAMUSCULAR; INTRAVENOUS at 08:29

## 2018-05-14 RX ADMIN — ACETAMINOPHEN 975 MG: 325 TABLET, FILM COATED ORAL at 13:31

## 2018-05-14 RX ADMIN — Medication 12.5 MG: at 07:44

## 2018-05-14 RX ADMIN — FENTANYL CITRATE 25 MCG: 50 INJECTION, SOLUTION INTRAMUSCULAR; INTRAVENOUS at 10:14

## 2018-05-14 RX ADMIN — PHENYLEPHRINE HYDROCHLORIDE 100 MCG: 10 INJECTION, SOLUTION INTRAMUSCULAR; INTRAVENOUS; SUBCUTANEOUS at 09:32

## 2018-05-14 RX ADMIN — Medication 5000 UNITS: at 11:21

## 2018-05-14 NOTE — IP AVS SNAPSHOT
MRN:[id number]                      After Visit Summary   5/14/2018    Ivan Bell    MRN: [id number]           Thank you!     Thank you for choosing [facility] for your care. Our goal is always to provide you with excellent care. Hearing back from our patients is one way we can continue to improve our services. Please take a few minutes to complete the written survey that you may receive in the mail after you visit with us. Thank you!        Patient Information     Date Of Birth          1965        About your hospital stay     You were admitted on:  May 14, 2018 You last received care in the:  Same Day [facility]    You were discharged on:  May 14, 2018       Who to Call     For medical emergencies, please call 911.  For non-urgent questions about your medical care, please call your primary care provider or clinic, [phone]  For questions related to your surgery, please call your surgery clinic        Attending Provider     Provider Specialty    Jelani Tristan MD Radiology       Primary Care Provider Office Phone # Fax #    Rosette Wills [phone]      Your next 10 appointments already scheduled     Jul 23, 2018  8:00 AM CDT   Lab with  LAB   [facility] ([facility])    [address]  1st Floor  [facility] [place] [address] [phone]            Jul 23, 2018  9:00 AM CDT   (Arrive by 8:45 AM)   Return General Liver with Gonzalo Miramontes MD   [facility] ([facility])    [address]  [address]  [place] [address] [phone]              Further instructions from your care team       No hot tub or bath for five days post procedure.  For the next 48 hours please apply pressure to the puncture site when coughing, sneezing or bearing down for a bowel movement.  No lotion or powder to R neck puncture site for 3 days.  Same-Day Surgery   Adult Discharge Orders  & Instructions     For 24 hours after surgery    1. Get plenty of rest.  A responsible adult must stay with you for at least 24 hours after you leave the hospital.   2. Do not drive or use heavy equipment.  If you have weakness or tingling, don't drive or use heavy equipment until this feeling goes away.  3. Do not drink alcohol.  4. Avoid strenuous or risky activities.  Ask for help when climbing stairs.   5. You may feel lightheaded.  IF so, sit for a few minutes before standing.  Have someone help you get up.   6. If you have nausea (feel sick to your stomach): Drink only clear liquids such as apple juice, ginger ale, broth or 7-Up.  Rest may also help.  Be sure to drink enough fluids.  Move to a regular diet as you feel able.  7. You may have a slight fever. Call the doctor if your fever is over 100 F (37.7 C) (taken under the tongue) or lasts longer than 24 hours.  8. You may have a dry mouth, a sore throat, muscle aches or trouble sleeping.  These should go away after 24 hours.  9. Do not make important or legal decisions.   Call your doctor for any of the followin.  Signs of infection (fever, growing tenderness at the surgery site, a large amount of drainage or bleeding, severe pain, foul-smelling drainage, redness, swelling).    2. It has been over 8 to 10 hours since surgery and you are still not able to urinate (pass water).    3.  Headache for over 24 hours.      To contact a doctor, call Interventional Radiology at [phone] or:        [phone] and ask for the resident on call for Interventional Radiology (answered 24 hours a day)      Emergency Department:    [facility]: [phone]       (TTY for hearing impaired: [phone])              Pending Results     Date and Time Order Name Status Description    2018 0646 IR Transven Intrahepatic Portosyst Shunt In process             Admission Information     Date & Time Provider Department Dept. Phone    2018 Jelani Tristan  "MD Jovan [facility] [phone]      Your Vitals Were     Blood Pressure Pulse Temperature Respirations Height Weight    107/69 72 97.6  F (36.4  C) (Oral) 16 1.778 m (5' 10\") 75 kg (165 lb 5.5 oz)    Pulse Oximetry BMI (Body Mass Index)                97% 23.72 kg/m2          [facility] Information     [facility] gives you secure access to your electronic health record. If you see a primary care provider, you can also send messages to your care team and make appointments. If you have questions, please call your primary care clinic.  If you do not have a primary care provider, please call [phone] and they will assist you.        Care EveryWhere ID     This is your Care EveryWhere ID. This could be used by other organizations to access your [facility] medical records  [id number]        Equal Access to Services     KAITLYN CAMACHO : Elie Myles, sarah guevara, jenny ramsey . So [facility] [phone].    ATENCIÓN: Si habla español, tiene a lee disposición servicios gratuitos de asistencia lingüística. Llame al [phone].    We comply with applicable federal civil rights laws and [place] laws. We do not discriminate on the basis of race, color, national origin, age, disability, sex, sexual orientation, or gender identity.               Review of your medicines      CONTINUE these medicines which have NOT CHANGED        Dose / Directions    carvedilol 6.25 MG tablet   Commonly known as:  COREG   Used for:  Secondary esophageal varices without bleeding (H), Alcoholic cirrhosis of liver with ascites (H)        Dose:  6.25 mg   Take 1 tablet (6.25 mg) by mouth 2 times daily (with meals)   Quantity:  180 tablet   Refills:  1       furosemide 20 MG tablet   Commonly known as:  LASIX   Used for:  Alcoholic cirrhosis of liver with ascites (H)        Dose:  60 mg   Take 3 tablets (60 mg) by mouth daily   Quantity:  120 tablet "   Refills:  2       lactulose 10 GM/15ML solution   Commonly known as:  CHRONULAC   Used for:  Alcoholic cirrhosis of liver with ascites (H), Hepatic encephalopathy (H)        Dose:  20 g   Take 30 mLs (20 g) by mouth 3 times daily as needed for constipation (Take as needed to maintain 3-4 bowel movements daily)   Quantity:  500 mL   Refills:  11       MULTIPLE VITAMINS PO        Dose:  1 tablet   Take 1 tablet by mouth daily   Refills:  0       SILDENAFIL CITRATE PO        Dose:  10 mg   Take 10 mg by mouth daily   Refills:  0       spironolactone 100 MG tablet   Commonly known as:  ALDACTONE   Used for:  Alcoholic cirrhosis of liver with ascites (H)        Dose:  150 mg   Take 1.5 tablets (150 mg) by mouth daily   Quantity:  120 tablet   Refills:  2       TYLENOL PO        Take by mouth every 4 hours as needed for mild pain or fever   Refills:  0                Protect others around you: Learn how to safely use, store and throw away your medicines at www.disposemymeds.org.             Medication List: This is a list of all your medications and when to take them. Check marks below indicate your daily home schedule. Keep this list as a reference.      Medications           Morning Afternoon Evening Bedtime As Needed    carvedilol 6.25 MG tablet   Commonly known as:  COREG   Take 1 tablet (6.25 mg) by mouth 2 times daily (with meals)                                furosemide 20 MG tablet   Commonly known as:  LASIX   Take 3 tablets (60 mg) by mouth daily                                lactulose 10 GM/15ML solution   Commonly known as:  CHRONULAC   Take 30 mLs (20 g) by mouth 3 times daily as needed for constipation (Take as needed to maintain 3-4 bowel movements daily)                                MULTIPLE VITAMINS PO   Take 1 tablet by mouth daily                                SILDENAFIL CITRATE PO   Take 10 mg by mouth daily                                spironolactone 100 MG tablet   Commonly known as:   ALDACTONE   Take 1.5 tablets (150 mg) by mouth daily                                TYLENOL PO   Take by mouth every 4 hours as needed for mild pain or fever

## 2018-05-14 NOTE — OR NURSING
Dr Hutchinson notified that patient did not take his coreg.  She ordered Metoprolol to be given preop which was done

## 2018-05-14 NOTE — ANESTHESIA CARE TRANSFER NOTE
Patient: Ivan Bell    Procedure(s):  Anesthesia Out of OR Transvenous Intrahepatic Systemic Shunt     Diagnosis: Alcohol Cirrhosis with Ascites   Diagnosis Additional Information: No value filed.    Anesthesia Type:   General     Note:  Airway :Face Mask  Patient transferred to:PACU  Comments: Vss, report to rn,  Sleepy but responds to voiceHandoff Report: Identifed the Patient, Identified the Reponsible Provider, Reviewed the pertinent medical history, Discussed the surgical course, Reviewed Intra-OP anesthesia mangement and issues during anesthesia, Set expectations for post-procedure period and Allowed opportunity for questions and acknowledgement of understanding      Vitals: (Last set prior to Anesthesia Care Transfer)    CRNA VITALS  5/14/2018 1102 - 5/14/2018 1132      5/14/2018             Resp Rate (observed): (!)  3                Electronically Signed By: GILLIAN Guillen CRNA  May 14, 2018  11:32 AM

## 2018-05-14 NOTE — PROGRESS NOTES
Patient Name: Ivan Bell  Medical Record Number: [id number]  Today's Date: 5/14/2018    Procedure: TIPS  Proceduralist: Dr. Tristan, Dr. Barrera    Sedation start time: General Anesthesia    Procedure start time: 0915  Puncture time: 0921  Procedure end time: 1125    Report given to: Pacu, per anesthesia  : N/A    Other Notes: Pt arrived to IR room 1 from pre-op. Pt denies any questions or concerns regarding procedure. Pt positioned supine and monitored per protocol. Pt monitored throughout by anesthesia. Pt transferred back to PACU.    Amanda Gipson RN

## 2018-05-14 NOTE — DISCHARGE INSTRUCTIONS
No hot tub or bath for five days post procedure.  For the next 48 hours please apply pressure to the puncture site when coughing, sneezing or bearing down for a bowel movement.  No lotion or powder to R neck puncture site for 3 days.  Same-Day Surgery   Adult Discharge Orders & Instructions     For 24 hours after surgery    1. Get plenty of rest.  A responsible adult must stay with you for at least 24 hours after you leave the hospital.   2. Do not drive or use heavy equipment.  If you have weakness or tingling, don't drive or use heavy equipment until this feeling goes away.  3. Do not drink alcohol.  4. Avoid strenuous or risky activities.  Ask for help when climbing stairs.   5. You may feel lightheaded.  IF so, sit for a few minutes before standing.  Have someone help you get up.   6. If you have nausea (feel sick to your stomach): Drink only clear liquids such as apple juice, ginger ale, broth or 7-Up.  Rest may also help.  Be sure to drink enough fluids.  Move to a regular diet as you feel able.  7. You may have a slight fever. Call the doctor if your fever is over 100 F (37.7 C) (taken under the tongue) or lasts longer than 24 hours.  8. You may have a dry mouth, a sore throat, muscle aches or trouble sleeping.  These should go away after 24 hours.  9. Do not make important or legal decisions.   Call your doctor for any of the followin.  Signs of infection (fever, growing tenderness at the surgery site, a large amount of drainage or bleeding, severe pain, foul-smelling drainage, redness, swelling).    2. It has been over 8 to 10 hours since surgery and you are still not able to urinate (pass water).    3.  Headache for over 24 hours.      To contact a doctor, call Interventional Radiology at [phone] or:        [phone] and ask for the resident on call for Interventional Radiology (answered 24 hours a day)      Emergency Department:    [facility]: [phone]       (TTY for hearing  impaired: [phone])

## 2018-05-14 NOTE — OR NURSING
Dr. GONZALO Tristan contacted regarding the need to void before discharge. He said it was OK if he did not, but to be sure and recommend watching for his urinating closely and bringing him to the ER if unable to void within 8-10 hours. Wife, Elicia and Ivan expressed understanding.

## 2018-05-14 NOTE — IP AVS SNAPSHOT
Same Day Surgery [address] [address]    Phone:  [phone]                                       After Visit Summary   5/14/2018    Ivan Bell    MRN: [id number]           After Visit Summary Signature Page     I have received my discharge instructions, and my questions have been answered. I have discussed any challenges I see with this plan with the nurse or doctor.    ..........................................................................................................................................  Patient/Patient Representative Signature      ..........................................................................................................................................  Patient Representative Print Name and Relationship to Patient    ..................................................               ................................................  Date                                            Time    ..........................................................................................................................................  Reviewed by Signature/Title    ...................................................              ..............................................  Date                                                            Time

## 2018-05-14 NOTE — OR NURSING
Dr Hutchinson in during interview from 0655 to 7:02.  At this time Dr Solorzano from service in to talk with patient about consent

## 2018-05-14 NOTE — BRIEF OP NOTE
Interventional Radiology Brief Post Procedure Note    Procedure: TIPS    Proceduralist: Jelani Tristan MD    Assistant: Jose Barrera MD    Time Out: Prior to the start of the procedure and with procedural staff participation, I verbally confirmed the patient s identity using two indicators, relevant allergies, that the procedure was appropriate and matched the consent or emergent situation, and that the correct equipment/implants were available. Immediately prior to starting the procedure I conducted the Time Out with the procedural staff and re-confirmed the patient s name, procedure, and site/side. (The Joint Commission universal protocol was followed.)  Yes        Sedation: General Endotracheal Anesthesia (GET) administered and documented by Anesthesia Care Provider    Findings: Unsuccessful TIPS placement    Estimated Blood Loss: Minimal    Fluoroscopy Time:  minute(s)    SPECIMENS: None    Complications: 1. None     Condition: Stable    Plan: Patient to PACU for postprocedure cares. Bedrest for at least 1 hour. IR to arrange followup.    Comments: See dictated procedure note for full details.    Jose Barrera MD

## 2018-05-14 NOTE — ANESTHESIA POSTPROCEDURE EVALUATION
Patient: [facility]    Procedure(s):  Anesthesia Out of OR Transvenous Intrahepatic Systemic Shunt     Diagnosis:Alcohol Cirrhosis with Ascites   Diagnosis Additional Information: No value filed.    Anesthesia Type:  General    Note:  Anesthesia Post Evaluation    Patient location during evaluation: PACU  Patient participation: Able to fully participate in evaluation  Level of consciousness: awake  Pain management: adequate  Airway patency: patent  Cardiovascular status: acceptable  Respiratory status: acceptable  Hydration status: acceptable  PONV: none     Anesthetic complications: None          Last vitals:  Vitals:    05/14/18 0634 05/14/18 1130 05/14/18 1145   BP: 122/78 111/62 92/57   Resp: 15 18 16   Temp: 36.7  C (98  F) 36.4  C (97.6  F)    SpO2: 100% 100% 95%         Electronically Signed By: Celina Hutchinson MD  May 14, 2018  11:56 AM

## 2018-05-14 NOTE — OR NURSING
Pt extubated on arrival to PACU by MARTINEZ Meléndez and Dr. Hutchinson.  Pt follows commands.  Lungs clear post extubation.  Sats in the mid 90's on 5L O2.  Pt breathing easily.

## 2018-05-14 NOTE — OR NURSING
Dr. Tristan of Radiology paged and notified of pt's PLT level of 44 and HGB of 9.  No new interventions ordered at this time.  Plan for pt to d/c to home today.

## 2018-05-15 ENCOUNTER — TELEPHONE (OUTPATIENT)
Dept: INTERVENTIONAL RADIOLOGY/VASCULAR | Facility: CLINIC | Age: 53
End: 2018-05-15

## 2018-05-15 ENCOUNTER — TELEPHONE (OUTPATIENT)
Dept: FAMILY MEDICINE | Facility: CLINIC | Age: 53
End: 2018-05-15

## 2018-05-15 DIAGNOSIS — I85.00 ESOPHAGEAL VARICES (H): Primary | ICD-10-CM

## 2018-05-15 NOTE — TELEPHONE ENCOUNTER
ED/UC/IP follow up phone call:   05/14/18  Alcoholic Cirrhosis of Liver with Ascites    RN please call to follow up    Number of ED visits in past 12 mths:   1    Poppy Vela  Clinic Station

## 2018-05-15 NOTE — TELEPHONE ENCOUNTER
"  ED / Discharge Outreach Protocol    Patient Contact    Attempt # 1    Was call answered?  Yes.  \"May I please speak with <patient name>\"  Is patient available?   No. Left message with female who answered for patient to call back. \"he is napping right now. \"  Edie Clark RNC      "

## 2018-05-16 ENCOUNTER — TELEPHONE (OUTPATIENT)
Dept: INTERVENTIONAL RADIOLOGY/VASCULAR | Facility: CLINIC | Age: 53
End: 2018-05-16

## 2018-05-16 ENCOUNTER — MYC MEDICAL ADVICE (OUTPATIENT)
Dept: GASTROENTEROLOGY | Facility: CLINIC | Age: 53
End: 2018-05-16

## 2018-05-16 NOTE — TELEPHONE ENCOUNTER
Attempted call to pt regarding is re-schedule TIPS procedure. Unsuccessful.    However am still working on appt details.     Plan would be to reschedule for Tues 6/5 or Weds 6/6.     Will try again later.     Denise Ramirez RN, BSN  Interventional Radiology Nurse Coordinator   Phone: [phone]

## 2018-05-16 NOTE — TELEPHONE ENCOUNTER
"ED/Discharge Protocol    \"Hi, my name is Tracy Awan, a registered nurse, and I am calling on behalf of Dr. Wills's office at [place].  I am calling to follow up and see how things are going for you after your recent visit.\"    \"I see that you were in the (ER/UC/IP) on 5/14/18.    How are you doing now that you are home?\" ok    Is patient experiencing symptoms that may require a hospital visit?  maybe    Discharge Instructions    \"Let's review your discharge instructions.  What is/are the follow-up recommendations?  Pt. Response: to have the TIPS procedure done again    \"Were you instructed to make a follow-up appointment?\"  Pt. Response: No.       \"When you see the provider, I would recommend that you bring your discharge instructions with you.    Medications    \"How many new medications are you on since your hospitalization/ED visit?\"    0-1  \"How many of your current medicines changed (dose, timing, name, etc.) while you were in the hospital/ED visit?\"   0-1  \"Do you have questions about your medications?\"   No  \"Were you newly diagnosed with heart failure, COPD, diabetes or did you have a heart attack?\"   No  For patients on insulin: \"Did you start on insulin in the hospital or did you have your insulin dose changed?\"   No    Medication reconciliation completed? No, due to busy    Was MTM referral placed (*Make sure to put transitions as reason for referral)?   No    Call Summary    \"Do you have any questions or concerns about your condition or care plan at the moment?\"    Yes   I need to set this procedure up again.  They were unable to perform it the other day as it was totally blocked off.  I am having some SOA.  Triage nurse advice given: To get a hold of the [facility] and if unable to do that then go directly to the nearest ED.    Patient was in ER 1 in the past year (assess appropriateness of ER visits.)      \"If you have questions or things don't continue to improve, we encourage you contact us through " "the main clinic number,  [phone].  Even if the clinic is not open, triage nurses are available 24/7 to help you.     We would like you to know that our clinic has extended hours (provide information).  We also have urgent care (provide details on closest location and hours/contact info)\"      \"Thank you for your time and take care!\"        "

## 2018-05-17 LAB
BLD PROD TYP BPU: NORMAL
BLD PROD TYP BPU: NORMAL
BLD UNIT ID BPU: 0
BLD UNIT ID BPU: 0
BLOOD PRODUCT CODE: NORMAL
BLOOD PRODUCT CODE: NORMAL
BPU ID: NORMAL
BPU ID: NORMAL
TRANSFUSION STATUS PATIENT QL: NORMAL

## 2018-05-17 NOTE — TELEPHONE ENCOUNTER
Spoke to patient to let him know that the schedule of testing and TIPS is being worked on by that department, and that he will hear from them when scheduling is finalized. Patient verbalized understanding and has no further questions.

## 2018-05-18 ENCOUNTER — RADIANT APPOINTMENT (OUTPATIENT)
Dept: CT IMAGING | Facility: CLINIC | Age: 53
End: 2018-05-18
Attending: RADIOLOGY
Payer: COMMERCIAL

## 2018-05-18 DIAGNOSIS — I85.00 ESOPHAGEAL VARICES (H): ICD-10-CM

## 2018-05-18 RX ORDER — IOPAMIDOL 755 MG/ML
101 INJECTION, SOLUTION INTRAVASCULAR ONCE
Status: COMPLETED | OUTPATIENT
Start: 2018-05-18 | End: 2018-05-18

## 2018-05-18 RX ADMIN — IOPAMIDOL 101 ML: 755 INJECTION, SOLUTION INTRAVASCULAR at 07:53

## 2018-05-18 NOTE — DISCHARGE INSTRUCTIONS

## 2018-05-21 NOTE — TELEPHONE ENCOUNTER
Called pt on his mobile phone and was not able to leave a VM.     Called pt on his home phone. Informed him that I've tried to schedule him sooner however with next week being a holiday, it set the scheduling back a little.     At this time, we have him scheduled for Weds 6/6. This is a 6am check for an 8am start time.     He verbalized understanding.     Informed him that If I'm able to schedule him sooner than I will see what I can do. He agrees to plan     Denise Ramirez RN, BSN  Interventional Radiology Nurse Coordinator   Phone: [phone]

## 2018-06-05 ENCOUNTER — ANESTHESIA EVENT (OUTPATIENT)
Dept: SURGERY | Facility: CLINIC | Age: 53
End: 2018-06-05
Payer: COMMERCIAL

## 2018-06-05 NOTE — ANESTHESIA PREPROCEDURE EVALUATION
Anesthesia Evaluation     . Pt has had prior anesthetic. Type: MAC    No history of anesthetic complications          ROS/MED HX    ENT/Pulmonary:  - neg pulmonary ROS     Neurologic: Comment: H/o hepatic encephalopathy on lactulose      Cardiovascular:     (+) hypertension----. : . . . :. . Previous cardiac testing Echodate:4/26/18results:Interpretation Summary     The visual ejection fraction is estimated at 55-60%.  Left ventricular systolic function is normal.  _____________________________________________________________________________  __        Left Ventricle  The left ventricle is normal in size. There is mild to moderate concentric  left ventricular hypertrophy. Left ventricular systolic function is normal.  The visual ejection fraction is estimated at 55-60%. Diastolic Doppler  findings (E/E' ratio and/or other parameters) suggest left ventricular filling  pressures are indeterminate. A false chord is noted (normal variant).     Right Ventricle  The right ventricle is normal in size and function.     Atria  Normal left atrial size. Right atrial size is normal. There is no color  Doppler evidence of an atrial shunt.     Mitral Valve  There is trace mitral regurgitation.        Tricuspid Valve  There is trace tricuspid regurgitation. The right ventricular systolic  pressure is approximated at 16.4 mmHg plus the right atrial pressure.     Aortic Valve  The aortic valve is trileaflet. There is trivial trileaflet aortic sclerosis.  No aortic regurgitation is present. No hemodynamically significant valvular  aortic stenosis.     Pulmonic Valve  There is no pulmonic valvular regurgitation.     Vessels  The aortic root is normal size. The inferior vena cava is not dilated.     Pericardium  There is no pericardial effusion. Moderate ascites.        Rhythm  Sinus rhythm was noted.  _____________________________________________________________________________date: results: date: results: date: results:           METS/Exercise Tolerance:     Hematologic:     (+) Other Hematologic Disorder-coagulopathic 2/2 liver disease (INR 1.33 as of 5/14/18)      Musculoskeletal:  - neg musculoskeletal ROS       GI/Hepatic: Comment: EtOH cirrhosis with esophageal varices, non-occlusive portal vein thrombosis    (+) hepatitis type Alcoholic, liver disease, Other GI/Hepatic ascites, controlled on diuretics      Renal/Genitourinary:  - ROS Renal section negative       Endo:  - neg endo ROS       Psychiatric:  - neg psychiatric ROS       Infectious Disease:  - neg infectious disease ROS       Malignancy:      - no malignancy   Other:    - neg other ROS               Procedure: Procedure(s):  Anesthesia Coverage Transjugular Intrahepatic Portosystemic Shunt Procedure @0800  - Wound Class:     HPI: Ivan Bell is a 52 year old male with PMHx as below. Patient presents for above procedure 2/2 liver cirrhosis and PV nonocclusive thrombosis.    PMHx/PSHx:  Past Medical History:   Diagnosis Date     Ascites      Cirrhosis (H)      Esophageal varices (H)      Hepatitis      Hypertension      Left calcaneus fracture 1/9/2006 January 16, 2006: Fell 10 feet from ladder onto left foot on frozen ground on 1/9/06 at home.  Immediate pain and unable to walk- seen at [place] and diagnosed with calcaneus fracture     Portal vein thrombosis     left occlusion, partial main       Past Surgical History:   Procedure Laterality Date     ANKLE SURGERY Left      COLONOSCOPY N/A 3/31/2016    Procedure: COLONOSCOPY;  Surgeon: Rhys Uriostegui MD;  Location:  GI     ESOPHAGOSCOPY, GASTROSCOPY, DUODENOSCOPY (EGD), COMBINED N/A 3/31/2016    Procedure: COMBINED ESOPHAGOSCOPY, GASTROSCOPY, DUODENOSCOPY (EGD);  Surgeon: Rhys Uriostegui MD;  Location:  GI     ESOPHAGOSCOPY, GASTROSCOPY, DUODENOSCOPY (EGD), COMBINED N/A 3/9/2018    Procedure: COMBINED ESOPHAGOSCOPY, GASTROSCOPY, DUODENOSCOPY (EGD), BIOPSY SINGLE OR MULTIPLE;  EGD;   Surgeon: Gonzalo Wahl MD;  Location:  GI     KNEE SURGERY Left      KNEE SURGERY Right      SIGMOIDOSCOPY FLEXIBLE N/A 10/31/2017    Procedure: SIGMOIDOSCOPY FLEXIBLE;;  Surgeon: Armaan Adams MD;  Location:  GI         No current facility-administered medications on file prior to encounter.   Current Outpatient Prescriptions on File Prior to Encounter:  Acetaminophen (TYLENOL PO) Take by mouth every 4 hours as needed for mild pain or fever   carvedilol (COREG) 6.25 MG tablet Take 1 tablet (6.25 mg) by mouth 2 times daily (with meals)   furosemide (LASIX) 20 MG tablet Take 3 tablets (60 mg) by mouth daily   lactulose (CHRONULAC) 10 GM/15ML solution Take 30 mLs (20 g) by mouth 3 times daily as needed for constipation (Take as needed to maintain 3-4 bowel movements daily)   MULTIPLE VITAMINS PO Take 1 tablet by mouth daily   SILDENAFIL CITRATE PO Take 10 mg by mouth daily   spironolactone (ALDACTONE) 100 MG tablet Take 1.5 tablets (150 mg) by mouth daily       Social Hx:   Social History   Substance Use Topics     Smoking status: Former Smoker     Types: Dip, chew, snus or snuff     Quit date: 8/29/1998     Smokeless tobacco: Former User     Quit date: 10/24/2017      Comment: 1 tin per 10 days.     Alcohol use No      Comment: last etoh 2/14/16, did have Odouls ~8/2017       Allergies:   Allergies   Allergen Reactions     Benadryl [Diphenhydramine] Other (See Comments)     Delirium (visual and auditory hallucinations)         NPO Status: Per ASA Guidelines    Labs:    Blood Bank:  Lab Results   Component Value Date    ABO A 05/14/2018    RH Neg 05/14/2018    AS Neg 05/14/2018     BMP:  Recent Labs   Lab Test  05/14/18   0712   NA  140   POTASSIUM  3.6   CHLORIDE  108   CO2  22   BUN  11   CR  1.02   GLC  92   JULISSA  8.5     CBC:   Recent Labs   Lab Test  05/14/18   1101   WBC  2.5*   RBC  3.15*   HGB  9.0*   HCT  28.3*   MCV  90   MCH  28.6   MCHC  31.8   RDW  18.5*   PLT  44*     Coags:  Recent Labs    Lab Test  05/14/18   0712   03/10/16   0610   INR  1.33*   < >  1.98*   PTT  29   --   45*   FIBR   --    --   181*    < > = values in this interval not displayed.       Results for orders placed or performed in visit on 05/18/18   CT Abdomen w contrast*    Narrative    CT ABDOMEN WITH CONTRAST 5/18/2018    CLINICAL HISTORY: Reevaluate portal and splenic veins after  unsuccessful TIPS attempt. History of nonocclusive thrombus in the  portal vein.    COMPARISONS: Ultrasound 4/26/2018. TIPS attempt 5/14/2018.    TECHNIQUE: Following the uneventful administration of intravenous  contrast, CT was performed through the abdomen.    DOSE (DLP): 231 mGy*cm    CONTRAST: 101 mL Isovue-370.    FINDINGS: Extrahepatic main portal vein nonocclusive thickening or  thrombus causes up to 50% diameter narrowing. Right and left  intrahepatic and extrahepatic portal veins are otherwise patent.    Heterogeneous opacification of the superior mesenteric vein is thought  to represent contrast mixing rather than thrombus. Splenic and  superior mesenteric vein are patent.    Hepatic veins were poorly demonstrated.    Splenomegaly. Large gastroesophageal varices. Ascites.    Cholelithiasis without CT evidence for cholecystitis.    Nonobstructing bilateral renal stones.    Spine degenerative changes.    Dependent atelectasis.      Impression    IMPRESSION:  1. Extrahepatic main portal vein nonocclusive thickening or thrombus  causes up to 50% diameter narrowing. Patent intrahepatic portal veins.    2. Portal hypertension with ascites, splenomegaly, and large  gastroesophageal varices.    AVA SHERMAN MD                        Anesthesia Plan      History & Physical Review  History and physical reviewed and following examination; no interval change.    ASA Status:  3 .        Plan for General and ETT with Intravenous induction. Maintenance will be Balanced.    PONV prophylaxis:  Ondansetron (or other 5HT-3)       Postoperative  Care  Postoperative pain management:  IV analgesics.      Consents            Sho Butler MD  CA-3/PGY-4  6/5/2018  3:30 PM                      .

## 2018-06-06 ENCOUNTER — APPOINTMENT (OUTPATIENT)
Dept: INTERVENTIONAL RADIOLOGY/VASCULAR | Facility: CLINIC | Age: 53
End: 2018-06-06
Attending: RADIOLOGY
Payer: COMMERCIAL

## 2018-06-06 ENCOUNTER — ANESTHESIA (OUTPATIENT)
Dept: SURGERY | Facility: CLINIC | Age: 53
End: 2018-06-06
Payer: COMMERCIAL

## 2018-06-06 ENCOUNTER — HOSPITAL ENCOUNTER (OUTPATIENT)
Facility: CLINIC | Age: 53
Discharge: HOME | End: 2018-06-06
Attending: STUDENT IN AN ORGANIZED HEALTH CARE EDUCATION/TRAINING PROGRAM | Admitting: STUDENT IN AN ORGANIZED HEALTH CARE EDUCATION/TRAINING PROGRAM
Payer: COMMERCIAL

## 2018-06-06 VITALS
HEIGHT: 70 IN | DIASTOLIC BLOOD PRESSURE: 64 MMHG | OXYGEN SATURATION: 96 % | TEMPERATURE: 98.2 F | BODY MASS INDEX: 23.64 KG/M2 | WEIGHT: 165.12 LBS | SYSTOLIC BLOOD PRESSURE: 125 MMHG | HEART RATE: 83 BPM | RESPIRATION RATE: 16 BRPM

## 2018-06-06 DIAGNOSIS — I85.00 ESOPHAGEAL VARICES (H): ICD-10-CM

## 2018-06-06 LAB
ABO + RH BLD: NORMAL
ABO + RH BLD: NORMAL
ALBUMIN SERPL-MCNC: 2.8 G/DL (ref 3.4–5)
ALP SERPL-CCNC: 157 U/L (ref 40–150)
ALT SERPL W P-5'-P-CCNC: 20 U/L (ref 0–70)
ANION GAP SERPL CALCULATED.3IONS-SCNC: 8 MMOL/L (ref 3–14)
AST SERPL W P-5'-P-CCNC: 39 U/L (ref 0–45)
BILIRUB SERPL-MCNC: 2.1 MG/DL (ref 0.2–1.3)
BLD GP AB SCN SERPL QL: NORMAL
BLD PROD TYP BPU: NORMAL
BLD UNIT ID BPU: 0
BLOOD BANK CMNT PATIENT-IMP: NORMAL
BLOOD PRODUCT CODE: NORMAL
BPU ID: NORMAL
BUN SERPL-MCNC: 15 MG/DL (ref 7–30)
CALCIUM SERPL-MCNC: 8.4 MG/DL (ref 8.5–10.1)
CHLORIDE SERPL-SCNC: 108 MMOL/L (ref 94–109)
CO2 SERPL-SCNC: 22 MMOL/L (ref 20–32)
CREAT SERPL-MCNC: 1.27 MG/DL (ref 0.66–1.25)
ERYTHROCYTE [DISTWIDTH] IN BLOOD BY AUTOMATED COUNT: 18.5 % (ref 10–15)
GFR SERPL CREATININE-BSD FRML MDRD: 59 ML/MIN/1.7M2
GLUCOSE BLDC GLUCOMTR-MCNC: 77 MG/DL (ref 70–99)
GLUCOSE SERPL-MCNC: 92 MG/DL (ref 70–99)
HCT VFR BLD AUTO: 32.2 % (ref 40–53)
HGB BLD-MCNC: 10.3 G/DL (ref 13.3–17.7)
INR PPP: 1.43 (ref 0.86–1.14)
INTERPRETATION ECG - MUSE: NORMAL
MCH RBC QN AUTO: 29.3 PG (ref 26.5–33)
MCHC RBC AUTO-ENTMCNC: 32 G/DL (ref 31.5–36.5)
MCV RBC AUTO: 92 FL (ref 78–100)
NUM BPU REQUESTED: 2
PLATELET # BLD AUTO: 38 10E9/L (ref 150–450)
POTASSIUM SERPL-SCNC: 3.2 MMOL/L (ref 3.4–5.3)
PROT SERPL-MCNC: 6.6 G/DL (ref 6.8–8.8)
RBC # BLD AUTO: 3.52 10E12/L (ref 4.4–5.9)
SODIUM SERPL-SCNC: 138 MMOL/L (ref 133–144)
SPECIMEN EXP DATE BLD: NORMAL
TRANSFUSION STATUS PATIENT QL: NORMAL
WBC # BLD AUTO: 2.8 10E9/L (ref 4–11)

## 2018-06-06 PROCEDURE — C9399 UNCLASSIFIED DRUGS OR BIOLOG: HCPCS

## 2018-06-06 PROCEDURE — 37182 INSERT HEPATIC SHUNT (TIPS): CPT

## 2018-06-06 PROCEDURE — 25000128 H RX IP 250 OP 636: Performed by: STUDENT IN AN ORGANIZED HEALTH CARE EDUCATION/TRAINING PROGRAM

## 2018-06-06 PROCEDURE — 37000008 ZZH ANESTHESIA TECHNICAL FEE, 1ST 30 MIN

## 2018-06-06 PROCEDURE — C1769 GUIDE WIRE: HCPCS

## 2018-06-06 PROCEDURE — P9041 ALBUMIN (HUMAN),5%, 50ML: HCPCS | Performed by: ANESTHESIOLOGY

## 2018-06-06 PROCEDURE — 25000132 ZZH RX MED GY IP 250 OP 250 PS 637

## 2018-06-06 PROCEDURE — 27210845 ZZH DEVICE INFLATION CR5

## 2018-06-06 PROCEDURE — 71000015 ZZH RECOVERY PHASE 1 LEVEL 2 EA ADDTL HR

## 2018-06-06 PROCEDURE — 40000065 ZZH STATISTIC EKG NON-CHARGEABLE

## 2018-06-06 PROCEDURE — 25000128 H RX IP 250 OP 636: Performed by: ANESTHESIOLOGY

## 2018-06-06 PROCEDURE — 25000128 H RX IP 250 OP 636: Performed by: RADIOLOGY

## 2018-06-06 PROCEDURE — 71000014 ZZH RECOVERY PHASE 1 LEVEL 2 FIRST HR

## 2018-06-06 PROCEDURE — C1887 CATHETER, GUIDING: HCPCS

## 2018-06-06 PROCEDURE — P9041 ALBUMIN (HUMAN),5%, 50ML: HCPCS

## 2018-06-06 PROCEDURE — 82962 GLUCOSE BLOOD TEST: CPT

## 2018-06-06 PROCEDURE — 86900 BLOOD TYPING SEROLOGIC ABO: CPT | Performed by: RADIOLOGY

## 2018-06-06 PROCEDURE — 93005 ELECTROCARDIOGRAM TRACING: CPT

## 2018-06-06 PROCEDURE — C1725 CATH, TRANSLUMIN NON-LASER: HCPCS

## 2018-06-06 PROCEDURE — 85027 COMPLETE CBC AUTOMATED: CPT | Performed by: RADIOLOGY

## 2018-06-06 PROCEDURE — C1874 STENT, COATED/COV W/DEL SYS: HCPCS

## 2018-06-06 PROCEDURE — P9037 PLATE PHERES LEUKOREDU IRRAD: HCPCS | Performed by: RADIOLOGY

## 2018-06-06 PROCEDURE — 25000125 ZZHC RX 250: Performed by: STUDENT IN AN ORGANIZED HEALTH CARE EDUCATION/TRAINING PROGRAM

## 2018-06-06 PROCEDURE — 80053 COMPREHEN METABOLIC PANEL: CPT | Performed by: RADIOLOGY

## 2018-06-06 PROCEDURE — 86923 COMPATIBILITY TEST ELECTRIC: CPT | Performed by: RADIOLOGY

## 2018-06-06 PROCEDURE — 27210732 ZZH ACCESSORY CR1

## 2018-06-06 PROCEDURE — 40000170 ZZH STATISTIC PRE-PROCEDURE ASSESSMENT II

## 2018-06-06 PROCEDURE — 36415 COLL VENOUS BLD VENIPUNCTURE: CPT | Performed by: RADIOLOGY

## 2018-06-06 PROCEDURE — P9016 RBC LEUKOCYTES REDUCED: HCPCS | Performed by: RADIOLOGY

## 2018-06-06 PROCEDURE — 49083 ABD PARACENTESIS W/IMAGING: CPT

## 2018-06-06 PROCEDURE — 27210908 ZZH NEEDLE CR4

## 2018-06-06 PROCEDURE — 25000125 ZZHC RX 250

## 2018-06-06 PROCEDURE — 86901 BLOOD TYPING SEROLOGIC RH(D): CPT | Performed by: RADIOLOGY

## 2018-06-06 PROCEDURE — 27211039 ZZH NEEDLE CR2

## 2018-06-06 PROCEDURE — 25000128 H RX IP 250 OP 636

## 2018-06-06 PROCEDURE — 37000009 ZZH ANESTHESIA TECHNICAL FEE, EACH ADDTL 15 MIN

## 2018-06-06 PROCEDURE — 93010 ELECTROCARDIOGRAM REPORT: CPT | Performed by: INTERNAL MEDICINE

## 2018-06-06 PROCEDURE — 27210891 ZZH BALLOON (NON-PTA) CR6

## 2018-06-06 PROCEDURE — 86850 RBC ANTIBODY SCREEN: CPT | Performed by: RADIOLOGY

## 2018-06-06 PROCEDURE — 71000027 ZZH RECOVERY PHASE 2 EACH 15 MINS

## 2018-06-06 PROCEDURE — 25000566 ZZH SEVOFLURANE, EA 15 MIN

## 2018-06-06 PROCEDURE — 85610 PROTHROMBIN TIME: CPT | Performed by: RADIOLOGY

## 2018-06-06 PROCEDURE — 27210905 ZZH KIT CR7

## 2018-06-06 RX ORDER — AMPICILLIN AND SULBACTAM 2; 1 G/1; G/1
3 INJECTION, POWDER, FOR SOLUTION INTRAMUSCULAR; INTRAVENOUS
Status: DISCONTINUED | OUTPATIENT
Start: 2018-06-06 | End: 2018-06-06 | Stop reason: HOSPADM

## 2018-06-06 RX ORDER — DEXTROSE MONOHYDRATE 25 G/50ML
25-50 INJECTION, SOLUTION INTRAVENOUS
Status: DISCONTINUED | OUTPATIENT
Start: 2018-06-06 | End: 2018-06-06 | Stop reason: HOSPADM

## 2018-06-06 RX ORDER — ONDANSETRON 2 MG/ML
INJECTION INTRAMUSCULAR; INTRAVENOUS PRN
Status: DISCONTINUED | OUTPATIENT
Start: 2018-06-06 | End: 2018-06-06

## 2018-06-06 RX ORDER — NALOXONE HYDROCHLORIDE 0.4 MG/ML
.1-.4 INJECTION, SOLUTION INTRAMUSCULAR; INTRAVENOUS; SUBCUTANEOUS
Status: DISCONTINUED | OUTPATIENT
Start: 2018-06-06 | End: 2018-06-06 | Stop reason: HOSPADM

## 2018-06-06 RX ORDER — ACETAMINOPHEN 500 MG
500 TABLET ORAL EVERY 6 HOURS PRN
Status: DISCONTINUED | OUTPATIENT
Start: 2018-06-06 | End: 2018-06-06 | Stop reason: HOSPADM

## 2018-06-06 RX ORDER — FENTANYL CITRATE 50 UG/ML
25-50 INJECTION, SOLUTION INTRAMUSCULAR; INTRAVENOUS
Status: DISCONTINUED | OUTPATIENT
Start: 2018-06-06 | End: 2018-06-06 | Stop reason: HOSPADM

## 2018-06-06 RX ORDER — ONDANSETRON 2 MG/ML
4 INJECTION INTRAMUSCULAR; INTRAVENOUS EVERY 30 MIN PRN
Status: DISCONTINUED | OUTPATIENT
Start: 2018-06-06 | End: 2018-06-06 | Stop reason: HOSPADM

## 2018-06-06 RX ORDER — SODIUM CHLORIDE, SODIUM LACTATE, POTASSIUM CHLORIDE, CALCIUM CHLORIDE 600; 310; 30; 20 MG/100ML; MG/100ML; MG/100ML; MG/100ML
INJECTION, SOLUTION INTRAVENOUS CONTINUOUS
Status: DISCONTINUED | OUTPATIENT
Start: 2018-06-06 | End: 2018-06-06 | Stop reason: HOSPADM

## 2018-06-06 RX ORDER — DEXMEDETOMIDINE HYDROCHLORIDE 4 UG/ML
0.2-1.2 INJECTION, SOLUTION INTRAVENOUS CONTINUOUS
Status: DISCONTINUED | OUTPATIENT
Start: 2018-06-06 | End: 2018-06-06 | Stop reason: HOSPADM

## 2018-06-06 RX ORDER — ALBUMIN, HUMAN INJ 5% 5 %
SOLUTION INTRAVENOUS CONTINUOUS PRN
Status: DISCONTINUED | OUTPATIENT
Start: 2018-06-06 | End: 2018-06-06

## 2018-06-06 RX ORDER — ACETAMINOPHEN 325 MG/1
975 TABLET ORAL ONCE
Status: COMPLETED | OUTPATIENT
Start: 2018-06-06 | End: 2018-06-06

## 2018-06-06 RX ORDER — CARVEDILOL 6.25 MG/1
6.25 TABLET ORAL 2 TIMES DAILY WITH MEALS
Status: DISCONTINUED | OUTPATIENT
Start: 2018-06-06 | End: 2018-06-06 | Stop reason: HOSPADM

## 2018-06-06 RX ORDER — LIDOCAINE 40 MG/G
CREAM TOPICAL
Status: DISCONTINUED | OUTPATIENT
Start: 2018-06-06 | End: 2018-06-06 | Stop reason: HOSPADM

## 2018-06-06 RX ORDER — DEXAMETHASONE SODIUM PHOSPHATE 4 MG/ML
4 INJECTION, SOLUTION INTRA-ARTICULAR; INTRALESIONAL; INTRAMUSCULAR; INTRAVENOUS; SOFT TISSUE
Status: DISCONTINUED | OUTPATIENT
Start: 2018-06-06 | End: 2018-06-06 | Stop reason: HOSPADM

## 2018-06-06 RX ORDER — ALBUMIN, HUMAN INJ 5% 5 %
25 SOLUTION INTRAVENOUS ONCE
Status: COMPLETED | OUTPATIENT
Start: 2018-06-06 | End: 2018-06-06

## 2018-06-06 RX ORDER — IODIXANOL 320 MG/ML
100 INJECTION, SOLUTION INTRAVASCULAR ONCE
Status: COMPLETED | OUTPATIENT
Start: 2018-06-06 | End: 2018-06-06

## 2018-06-06 RX ORDER — DEXAMETHASONE SODIUM PHOSPHATE 4 MG/ML
INJECTION, SOLUTION INTRA-ARTICULAR; INTRALESIONAL; INTRAMUSCULAR; INTRAVENOUS; SOFT TISSUE PRN
Status: DISCONTINUED | OUTPATIENT
Start: 2018-06-06 | End: 2018-06-06

## 2018-06-06 RX ORDER — HYDROMORPHONE HYDROCHLORIDE 1 MG/ML
.3-.5 INJECTION, SOLUTION INTRAMUSCULAR; INTRAVENOUS; SUBCUTANEOUS EVERY 5 MIN PRN
Status: DISCONTINUED | OUTPATIENT
Start: 2018-06-06 | End: 2018-06-06 | Stop reason: HOSPADM

## 2018-06-06 RX ORDER — LIDOCAINE HYDROCHLORIDE 20 MG/ML
INJECTION, SOLUTION INFILTRATION; PERINEURAL PRN
Status: DISCONTINUED | OUTPATIENT
Start: 2018-06-06 | End: 2018-06-06

## 2018-06-06 RX ORDER — FENTANYL CITRATE 50 UG/ML
INJECTION, SOLUTION INTRAMUSCULAR; INTRAVENOUS PRN
Status: DISCONTINUED | OUTPATIENT
Start: 2018-06-06 | End: 2018-06-06

## 2018-06-06 RX ORDER — LABETALOL HYDROCHLORIDE 5 MG/ML
10 INJECTION, SOLUTION INTRAVENOUS
Status: DISCONTINUED | OUTPATIENT
Start: 2018-06-06 | End: 2018-06-06 | Stop reason: HOSPADM

## 2018-06-06 RX ORDER — OXYCODONE HYDROCHLORIDE 5 MG/1
5 TABLET ORAL EVERY 4 HOURS PRN
Status: DISCONTINUED | OUTPATIENT
Start: 2018-06-06 | End: 2018-06-06 | Stop reason: HOSPADM

## 2018-06-06 RX ORDER — ONDANSETRON 4 MG/1
4 TABLET, ORALLY DISINTEGRATING ORAL EVERY 30 MIN PRN
Status: DISCONTINUED | OUTPATIENT
Start: 2018-06-06 | End: 2018-06-06 | Stop reason: HOSPADM

## 2018-06-06 RX ORDER — NICOTINE POLACRILEX 4 MG
15-30 LOZENGE BUCCAL
Status: DISCONTINUED | OUTPATIENT
Start: 2018-06-06 | End: 2018-06-06 | Stop reason: HOSPADM

## 2018-06-06 RX ORDER — PROPOFOL 10 MG/ML
INJECTION, EMULSION INTRAVENOUS PRN
Status: DISCONTINUED | OUTPATIENT
Start: 2018-06-06 | End: 2018-06-06

## 2018-06-06 RX ADMIN — ROCURONIUM BROMIDE 20 MG: 10 INJECTION INTRAVENOUS at 09:18

## 2018-06-06 RX ADMIN — PHENYLEPHRINE HYDROCHLORIDE 100 MCG: 10 INJECTION, SOLUTION INTRAMUSCULAR; INTRAVENOUS; SUBCUTANEOUS at 08:25

## 2018-06-06 RX ADMIN — FENTANYL CITRATE 100 MCG: 50 INJECTION, SOLUTION INTRAMUSCULAR; INTRAVENOUS at 08:25

## 2018-06-06 RX ADMIN — FENTANYL CITRATE 50 MCG: 50 INJECTION INTRAMUSCULAR; INTRAVENOUS at 11:25

## 2018-06-06 RX ADMIN — SODIUM CHLORIDE, POTASSIUM CHLORIDE, SODIUM LACTATE AND CALCIUM CHLORIDE: 600; 310; 30; 20 INJECTION, SOLUTION INTRAVENOUS at 08:08

## 2018-06-06 RX ADMIN — SUGAMMADEX 300 MG: 100 INJECTION, SOLUTION INTRAVENOUS at 10:31

## 2018-06-06 RX ADMIN — PHENYLEPHRINE HYDROCHLORIDE 200 MCG: 10 INJECTION, SOLUTION INTRAMUSCULAR; INTRAVENOUS; SUBCUTANEOUS at 08:34

## 2018-06-06 RX ADMIN — PHENYLEPHRINE HYDROCHLORIDE 100 MCG: 10 INJECTION, SOLUTION INTRAMUSCULAR; INTRAVENOUS; SUBCUTANEOUS at 08:45

## 2018-06-06 RX ADMIN — DEXMEDETOMIDINE HYDROCHLORIDE 0.2 MCG/KG/HR: 4 INJECTION, SOLUTION INTRAVENOUS at 08:55

## 2018-06-06 RX ADMIN — HYDROMORPHONE HYDROCHLORIDE 0.5 MG: 1 INJECTION, SOLUTION INTRAMUSCULAR; INTRAVENOUS; SUBCUTANEOUS at 12:07

## 2018-06-06 RX ADMIN — ROCURONIUM BROMIDE 50 MG: 10 INJECTION INTRAVENOUS at 08:25

## 2018-06-06 RX ADMIN — LIDOCAINE HYDROCHLORIDE 80 MG: 20 INJECTION, SOLUTION INFILTRATION; PERINEURAL at 08:25

## 2018-06-06 RX ADMIN — ALBUMIN HUMAN: 0.05 INJECTION, SOLUTION INTRAVENOUS at 10:00

## 2018-06-06 RX ADMIN — ALBUMIN HUMAN 12.5 G: 0.05 INJECTION, SOLUTION INTRAVENOUS at 11:23

## 2018-06-06 RX ADMIN — ROCURONIUM BROMIDE 10 MG: 10 INJECTION INTRAVENOUS at 10:00

## 2018-06-06 RX ADMIN — ONDANSETRON 4 MG: 2 INJECTION INTRAMUSCULAR; INTRAVENOUS at 10:24

## 2018-06-06 RX ADMIN — IODIXANOL 80 ML: 320 INJECTION, SOLUTION INTRAVASCULAR at 10:29

## 2018-06-06 RX ADMIN — OXYCODONE HYDROCHLORIDE 5 MG: 5 TABLET ORAL at 12:29

## 2018-06-06 RX ADMIN — HYDROMORPHONE HYDROCHLORIDE 0.5 MG: 1 INJECTION, SOLUTION INTRAMUSCULAR; INTRAVENOUS; SUBCUTANEOUS at 12:47

## 2018-06-06 RX ADMIN — PROPOFOL 150 MG: 10 INJECTION, EMULSION INTRAVENOUS at 08:25

## 2018-06-06 RX ADMIN — PHENYLEPHRINE HYDROCHLORIDE 0.5 MCG/KG/MIN: 10 INJECTION, SOLUTION INTRAMUSCULAR; INTRAVENOUS; SUBCUTANEOUS at 08:44

## 2018-06-06 RX ADMIN — PHENYLEPHRINE HYDROCHLORIDE 0.5 MCG/KG/MIN: 10 INJECTION, SOLUTION INTRAMUSCULAR; INTRAVENOUS; SUBCUTANEOUS at 09:15

## 2018-06-06 RX ADMIN — PHENYLEPHRINE HYDROCHLORIDE 200 MCG: 10 INJECTION, SOLUTION INTRAMUSCULAR; INTRAVENOUS; SUBCUTANEOUS at 09:55

## 2018-06-06 RX ADMIN — ACETAMINOPHEN 975 MG: 325 TABLET, FILM COATED ORAL at 06:52

## 2018-06-06 RX ADMIN — PHENYLEPHRINE HYDROCHLORIDE 100 MCG: 10 INJECTION, SOLUTION INTRAMUSCULAR; INTRAVENOUS; SUBCUTANEOUS at 08:41

## 2018-06-06 RX ADMIN — FENTANYL CITRATE 50 MCG: 50 INJECTION, SOLUTION INTRAMUSCULAR; INTRAVENOUS at 09:50

## 2018-06-06 RX ADMIN — HYDROMORPHONE HYDROCHLORIDE 0.5 MG: 1 INJECTION, SOLUTION INTRAMUSCULAR; INTRAVENOUS; SUBCUTANEOUS at 11:37

## 2018-06-06 RX ADMIN — DEXAMETHASONE SODIUM PHOSPHATE 8 MG: 4 INJECTION, SOLUTION INTRA-ARTICULAR; INTRALESIONAL; INTRAMUSCULAR; INTRAVENOUS; SOFT TISSUE at 08:33

## 2018-06-06 RX ADMIN — CARVEDILOL 6.25 MG: 6.25 TABLET, FILM COATED ORAL at 07:02

## 2018-06-06 ASSESSMENT — PAIN DESCRIPTION - DESCRIPTORS
DESCRIPTORS: PRESSURE

## 2018-06-06 NOTE — IP AVS SNAPSHOT
MRN:[id number]                      After Visit Summary   6/6/2018    Ivan Bell    MRN: [id number]           Thank you!     Thank you for choosing [facility] for your care. Our goal is always to provide you with excellent care. Hearing back from our patients is one way we can continue to improve our services. Please take a few minutes to complete the written survey that you may receive in the mail after you visit with us. Thank you!        Patient Information     Date Of Birth          1965        About your hospital stay     You were admitted on:  June 6, 2018 You last received care in the:  Same Day Surgery [facility]    You were discharged on:  June 6, 2018       Who to Call     For medical emergencies, please call 911.  For non-urgent questions about your medical care, please call your primary care provider or clinic, [phone]  For questions related to your surgery, please call your surgery clinic        Attending Provider     Provider Specialty    Oniel Real MD Anesthesiology       Primary Care Provider Office Phone # Fax #    Rosette Wills [phone]      Your next 10 appointments already scheduled     Jul 23, 2018  8:00 AM CDT   Lab with  LAB   [facility] ([facility] [place])    [address]  1st Floor  [place] [address] [phone]            Jul 23, 2018  9:00 AM CDT   (Arrive by 8:45 AM)   Return General Liver with Gonzalo Miramontes MD   [facility] Hepatology ([facility] [place])    [address]  [address]  [place] [address] [phone]              Further instructions from your care team       No lotion or powder to puncture site for 3 days.  Support the puncture site for coughing, sneezing, or moving your bowels for the first 48 hours.  or 5 days post procedure - no tub bath.     For patients on anticoagulation, patient should return to the primary clinic in 3 days  after the procedure to have INR blood drawn.  Patient should be established in an anticoagulation clinic for routine anticoagulation management.    [facility] [place], [place]  Same-Day Surgery   Adult Discharge Orders & Instructions     For 24 hours after surgery    1. Get plenty of rest.  A responsible adult must stay with you for at least 24 hours after you leave the hospital.   2. Do not drive or use heavy equipment.  If you have weakness or tingling, don't drive or use heavy equipment until this feeling goes away.  3. Do not drink alcohol.  4. Avoid strenuous or risky activities.  Ask for help when climbing stairs.   5. You may feel lightheaded.  IF so, sit for a few minutes before standing.  Have someone help you get up.   6. If you have nausea (feel sick to your stomach): Drink only clear liquids such as apple juice, ginger ale, broth or 7-Up.  Rest may also help.  Be sure to drink enough fluids.  Move to a regular diet as you feel able.  7. You may have a slight fever. Call the doctor if your fever is over 100 F (37.7 C) (taken under the tongue) or lasts longer than 24 hours.  8. You may have a dry mouth, a sore throat, muscle aches or trouble sleeping.  These should go away after 24 hours.  9. Do not make important or legal decisions.   Call your doctor for any of the followin.  Signs of infection (fever, growing tenderness at the surgery site, a large amount of drainage or bleeding, severe pain, foul-smelling drainage, redness, swelling).    2. It has been over 8 to 10 hours since surgery and you are still not able to urinate (pass water).    3.  Headache for over 24 hours.    To contact a doctor, call Dr. WILLIAMSON @ [phone] or:        [phone] and ask for the resident on call for INTERVENTIONAL RADIOLOGY (IR) (answered 24 hours a day)      Emergency Department:    [facility]: [phone]       (TTY for hearing impaired: [phone])    [facility]:  "[phone]       (TTY for hearing impaired: [phone])    Pending Results     Date and Time Order Name Status Description    6/6/2018 0850 Platelets prepare order unit In process     6/6/2018 0716 IR Transven Intrahepatic Portosyst Shunt In process             Statement of Approval     Ordered          06/06/18 1028  I have reviewed and agree with all the recommendations and orders detailed in this document.  EFFECTIVE NOW     Approved and electronically signed by:  Jose Barrera MD             Admission Information     Date & Time Provider Department Dept. Phone    6/6/2018 Oniel Real MD Same Day Surgery [facility] [phone]      Your Vitals Were     Blood Pressure Pulse Temperature Respirations Height Weight    120/55 83 98.2  F (36.8  C) (Oral) 16 1.778 m (5' 10\") 74.9 kg (165 lb 2 oz)    Pulse Oximetry BMI (Body Mass Index)                92% 23.69 kg/m2          [email or web address] Information     [employer] gives you secure access to your electronic health record. If you see a primary care provider, you can also send messages to your care team and make appointments. If you have questions, please call your primary care clinic.  If you do not have a primary care provider, please call [phone] and they will assist you.        Care EveryWhere ID     This is your Care EveryWhere ID. This could be used by other organizations to access your [facility] medical records  [id number]        Equal Access to Services     KAITLYN CAMACHO AH: Hadii linnea melendezo Sobud, waaxda luqadaha, qaybta kaalmada misha, jenny gaitan . So [facility] [phone].    ATENCIÓN: Si habla español, tiene a lee disposición servicios gratuitos de asistencia lingüística. Llame al [phone].    We comply with applicable federal civil rights laws and [place] laws. We do not discriminate on the basis of race, color, national origin, age, disability, sex, sexual orientation, or gender identity.             "   Review of your medicines      CONTINUE these medicines which have NOT CHANGED        Dose / Directions    carvedilol 6.25 MG tablet   Commonly known as:  COREG   Used for:  Secondary esophageal varices without bleeding (H), Alcoholic cirrhosis of liver with ascites (H)        Dose:  6.25 mg   Take 1 tablet (6.25 mg) by mouth 2 times daily (with meals)   Quantity:  180 tablet   Refills:  1       furosemide 20 MG tablet   Commonly known as:  LASIX   Used for:  Alcoholic cirrhosis of liver with ascites (H)        Dose:  60 mg   Take 3 tablets (60 mg) by mouth daily   Quantity:  120 tablet   Refills:  2       lactulose 10 GM/15ML solution   Commonly known as:  CHRONULAC   Used for:  Alcoholic cirrhosis of liver with ascites (H), Hepatic encephalopathy (H)        Dose:  20 g   Take 30 mLs (20 g) by mouth 3 times daily as needed for constipation (Take as needed to maintain 3-4 bowel movements daily)   Quantity:  500 mL   Refills:  11       MULTIPLE VITAMINS PO        Dose:  1 tablet   Take 1 tablet by mouth daily   Refills:  0       SILDENAFIL CITRATE PO        Dose:  10 mg   Take 10 mg by mouth daily   Refills:  0       spironolactone 100 MG tablet   Commonly known as:  ALDACTONE   Used for:  Alcoholic cirrhosis of liver with ascites (H)        Dose:  150 mg   Take 1.5 tablets (150 mg) by mouth daily   Quantity:  120 tablet   Refills:  2       TYLENOL PO        Take by mouth every 4 hours as needed for mild pain or fever   Refills:  0                Protect others around you: Learn how to safely use, store and throw away your medicines at www.disposemymeds.org.             Medication List: This is a list of all your medications and when to take them. Check marks below indicate your daily home schedule. Keep this list as a reference.      Medications           Morning Afternoon Evening Bedtime As Needed    carvedilol 6.25 MG tablet   Commonly known as:  COREG   Take 1 tablet (6.25 mg) by mouth 2 times daily (with meals)    Last time this was given:  6.25 mg on 6/6/2018  7:02 AM                                furosemide 20 MG tablet   Commonly known as:  LASIX   Take 3 tablets (60 mg) by mouth daily                                lactulose 10 GM/15ML solution   Commonly known as:  CHRONULAC   Take 30 mLs (20 g) by mouth 3 times daily as needed for constipation (Take as needed to maintain 3-4 bowel movements daily)                                MULTIPLE VITAMINS PO   Take 1 tablet by mouth daily                                SILDENAFIL CITRATE PO   Take 10 mg by mouth daily                                spironolactone 100 MG tablet   Commonly known as:  ALDACTONE   Take 1.5 tablets (150 mg) by mouth daily                                TYLENOL PO   Take by mouth every 4 hours as needed for mild pain or fever   Last time this was given:  975 mg on 6/6/2018  6:52 AM

## 2018-06-06 NOTE — ANESTHESIA CARE TRANSFER NOTE
Patient: Ivan Bell    Procedure(s):  Anesthesia Coverage Transjugular Intrahepatic Portosystemic Shunt Procedure @0800     Diagnosis: Esophageal Varices   Diagnosis Additional Information: No value filed.    Anesthesia Type:   General, ETT     Note:  Airway :Nasal Cannula  Patient transferred to:PACU  Comments: Patient is awake and alert. No complaints. Vss. rn given report at bedside.Handoff Report: Identifed the Patient, Identified the Reponsible Provider, Reviewed the pertinent medical history, Discussed the surgical course, Reviewed Intra-OP anesthesia mangement and issues during anesthesia, Set expectations for post-procedure period and Allowed opportunity for questions and acknowledgement of understanding      Vitals: (Last set prior to Anesthesia Care Transfer)    CRNA VITALS  6/6/2018 1005 - 6/6/2018 1047      6/6/2018             Pulse: 78    Ht Rate: 78    SpO2: 100 %    Resp Rate (observed): 24    Resp Rate (set): 14                Electronically Signed By: Sho Butler MD  June 6, 2018  10:47 AM

## 2018-06-06 NOTE — ANESTHESIA POSTPROCEDURE EVALUATION
Patient: Ivan Bell    Procedure(s):  Anesthesia Coverage Transjugular Intrahepatic Portosystemic Shunt Procedure @0800     Diagnosis:Esophageal Varices   Diagnosis Additional Information: No value filed.    Anesthesia Type:  General, ETT    Note:  Anesthesia Post Evaluation    Patient location during evaluation: PACU  Patient participation: Able to fully participate in evaluation  Level of consciousness: awake and alert  Pain management: adequate  Airway patency: patent  Cardiovascular status: acceptable and hemodynamically stable  Respiratory status: acceptable  Hydration status: acceptable  PONV: none     Anesthetic complications: None          Last vitals:  Vitals:    06/06/18 1130 06/06/18 1145 06/06/18 1200   BP: 104/58 105/63 111/65   Pulse:      Resp: 16 13 12   Temp: 35.4  C (95.7  F) 35.5  C (95.9  F) 35.6  C (96.1  F)   SpO2: 100% 100% 100%         Electronically Signed By: Jean De Oliveira MD  June 6, 2018  1:04 PM

## 2018-06-06 NOTE — OR NURSING
Call to Dr De Oliveira regarding pt's platelet level of 38, no new orders at this time, he will contact IR.

## 2018-06-06 NOTE — IP AVS SNAPSHOT
Same Day Surgery [address] [address]    Phone:  [phone]                                       After Visit Summary   6/6/2018    Ivan Bell    MRN: [id number]           After Visit Summary Signature Page     I have received my discharge instructions, and my questions have been answered. I have discussed any challenges I see with this plan with the nurse or doctor.    ..........................................................................................................................................  Patient/Patient Representative Signature      ..........................................................................................................................................  Patient Representative Print Name and Relationship to Patient    ..................................................               ................................................  Date                                            Time    ..........................................................................................................................................  Reviewed by Signature/Title    ...................................................              ..............................................  Date                                                            Time

## 2018-06-06 NOTE — IR NOTE
Patient Name: Ivan Bell  Medical Record Number: [id number]  Today's Date: 6/6/2018    Procedure: transjugular intrahepatic portal-systemic shunting  Proceduralist: Jelani Tristan MD; Jose Barrera MD; Michelle Trinidad MD  Anesthesia Staff: Laura Butler MD; Antwon Uriostegui MD    Procedure start time: 0900  Puncture time: 0905  Procedure end time: 1030    Other Notes: Pt arrived to IR room 4 from . Pt denies any questions or concerns regarding procedure. Consent reviewed. Pt positioned supine and monitored per protocol. Access obtained. Target vessel identified. Stent deployed. Sheath removed. Manual pressure applied. Dressing applied. Hemostasis obtained. Transparent dressing applied.  Pt tolerated procedure without any noted complications. Pt transferred back to  accompanied by anesthesia staff.

## 2018-06-06 NOTE — DISCHARGE INSTRUCTIONS
No lotion or powder to puncture site for 3 days.  Support the puncture site for coughing, sneezing, or moving your bowels for the first 48 hours.  or 5 days post procedure - no tub bath.     For patients on anticoagulation, patient should return to the primary clinic in 3 days after the procedure to have INR blood drawn.  Patient should be established in an anticoagulation clinic for routine anticoagulation management.    [facility], [place]  Same-Day Surgery   Adult Discharge Orders & Instructions     For 24 hours after surgery    1. Get plenty of rest.  A responsible adult must stay with you for at least 24 hours after you leave the hospital.   2. Do not drive or use heavy equipment.  If you have weakness or tingling, don't drive or use heavy equipment until this feeling goes away.  3. Do not drink alcohol.  4. Avoid strenuous or risky activities.  Ask for help when climbing stairs.   5. You may feel lightheaded.  IF so, sit for a few minutes before standing.  Have someone help you get up.   6. If you have nausea (feel sick to your stomach): Drink only clear liquids such as apple juice, ginger ale, broth or 7-Up.  Rest may also help.  Be sure to drink enough fluids.  Move to a regular diet as you feel able.  7. You may have a slight fever. Call the doctor if your fever is over 100 F (37.7 C) (taken under the tongue) or lasts longer than 24 hours.  8. You may have a dry mouth, a sore throat, muscle aches or trouble sleeping.  These should go away after 24 hours.  9. Do not make important or legal decisions.   Call your doctor for any of the followin.  Signs of infection (fever, growing tenderness at the surgery site, a large amount of drainage or bleeding, severe pain, foul-smelling drainage, redness, swelling).    2. It has been over 8 to 10 hours since surgery and you are still not able to urinate (pass water).    3.  Headache for over 24 hours.    To contact a doctor, call   [facility] @ [phone] or:        [phone] and ask for the resident on call for INTERVENTIONAL RADIOLOGY (IR) (answered 24 hours a day)      Emergency Department:    [facility]: [phone]       (TTY for hearing impaired: [phone])    [facility]: [phone]       (TTY for hearing impaired: [phone])

## 2018-06-06 NOTE — OR NURSING
Pt doing much better.. VSS Pain under control.. Sign out,  d/c instructions., Brief op note done. . Report to 3C Tx via cart

## 2018-06-06 NOTE — BRIEF OP NOTE
Interventional Radiology Brief Post Procedure Note    Procedure: TIPS placement    Proceduralist: Michelle Trinidad MD and Jelani Tristan MD    Assistant: Jose Barrera MD    Time Out: Prior to the start of the procedure and with procedural staff participation, I verbally confirmed the patient s identity using two indicators, relevant allergies, that the procedure was appropriate and matched the consent or emergent situation, and that the correct equipment/implants were available. Immediately prior to starting the procedure I conducted the Time Out with the procedural staff and re-confirmed the patient s name, procedure, and site/side. (The Joint Commission universal protocol was followed.)  Yes        Sedation: General Endotracheal Anesthesia (GET) administered and documented by Anesthesia Care Provider    Findings: Placement of 8-10mm x 6cm x 2cm TIPS endoprosthesis    Estimated Blood Loss: Minimal    Fluoroscopy Time:  minute(s)    SPECIMENS: None    Complications: 1. None     Condition: Stable    Plan: Patient to PACU for postprocedure cares. Bedrest for at least 2 hours. IR to see patient prior to discharge and will arrange clinic followup in 1 month.    Comments: See dictated procedure note for full details.    Jose Barrera MD3  [phone]

## 2018-06-06 NOTE — OR NURSING
Patient complained of rt shoulder pain on arrival to PACU, anesthesia notified and saw patient.  IR also came to evaluate patient, and stated it was referred pain. Pain medication given with some relief.  Patient hypotensive on arrival to PACU, 500cc 5% albumin ordered per anesthesia.  1145 Handoff report given to Dennys Xiao RN

## 2018-06-07 ENCOUNTER — TELEPHONE (OUTPATIENT)
Dept: OTHER | Facility: CLINIC | Age: 53
End: 2018-06-07

## 2018-06-07 DIAGNOSIS — Z95.828 S/P TIPS (TRANSJUGULAR INTRAHEPATIC PORTOSYSTEMIC SHUNT): ICD-10-CM

## 2018-06-07 DIAGNOSIS — G89.18 OTHER ACUTE POSTPROCEDURAL PAIN: Primary | ICD-10-CM

## 2018-06-07 RX ORDER — OXYCODONE HYDROCHLORIDE 5 MG/1
5-10 TABLET ORAL EVERY 6 HOURS PRN
Qty: 20 TABLET | Refills: 0 | Status: ON HOLD | OUTPATIENT
Start: 2018-06-07 | End: 2018-06-10

## 2018-06-07 NOTE — TELEPHONE ENCOUNTER
Elicia called regarding patient. He had a TIPS placement with Dr. Tristan in IR on 6/6. In the recovery unit he was complaining of right arm pain and headache. He still has that same pain today and states he takes tylenol and it is not managing the pain. He describes this pain as a dull, constant pain. He is otherwise feeling well, without fevers, abdominal pain. He denies any musculoskeletal concerns prior to coming for his procedure.    Spoke with Dr. Tristan regarding Ivan's concerns. He states that Ivan should not be taking tylenol. He is willing to give him an rx for oxycodone 5 mg tabs. A small amount to hopefully get him through this post-procedural period. This is likely related to positioning from the procedure. If this does not improve he will need to see Dr. Tristan in clinic for follow-up.     Elicia confirms this plan. The rx will be at the discharge pharmacy for  here at the hospital.     Beryl Peterson DNP, GILLIAN  Interventional Radiology   Phone: [phone]

## 2018-06-08 ENCOUNTER — TELEPHONE (OUTPATIENT)
Dept: INTERVENTIONAL RADIOLOGY/VASCULAR | Facility: CLINIC | Age: 53
End: 2018-06-08

## 2018-06-08 DIAGNOSIS — I85.00 ESOPHAGEAL VARICES (H): Primary | ICD-10-CM

## 2018-06-08 NOTE — TELEPHONE ENCOUNTER
Called to f/u on pt s/p discharge and ongoing pain and headache.     Called pt's cell phone in which I was unsuccessful and received a busy signal.    I then called pt's home phone in which I spoke to him.     I inquired as to how he is doing today. Called to f/u on his pain in which he states, that it's gotten better. He did have some shoulder pain in which he states that this is better. He does have some slight abd pain but is now tolerable.       He states that he's doing well so far and that we will go ahead and schedule his follow up appt and let him know. He agrees to plan.     Denise Ramirez RN, BSN  Interventional Radiology Nurse Coordinator   Phone: [phone]

## 2018-06-09 ENCOUNTER — HOSPITAL ENCOUNTER (INPATIENT)
Facility: CLINIC | Age: 53
LOS: 2 days | Discharge: HOME | End: 2018-06-12
Attending: EMERGENCY MEDICINE | Admitting: PEDIATRICS
Payer: COMMERCIAL

## 2018-06-09 ENCOUNTER — APPOINTMENT (OUTPATIENT)
Dept: CT IMAGING | Facility: CLINIC | Age: 53
End: 2018-06-09
Attending: EMERGENCY MEDICINE
Payer: COMMERCIAL

## 2018-06-09 ENCOUNTER — APPOINTMENT (OUTPATIENT)
Dept: GENERAL RADIOLOGY | Facility: CLINIC | Age: 53
End: 2018-06-09
Attending: EMERGENCY MEDICINE
Payer: COMMERCIAL

## 2018-06-09 ENCOUNTER — TELEPHONE (OUTPATIENT)
Dept: INTERVENTIONAL RADIOLOGY/VASCULAR | Facility: CLINIC | Age: 53
End: 2018-06-09

## 2018-06-09 DIAGNOSIS — N39.0 URINARY TRACT INFECTION WITHOUT HEMATURIA, SITE UNSPECIFIED: ICD-10-CM

## 2018-06-09 DIAGNOSIS — R41.0 DELIRIUM: ICD-10-CM

## 2018-06-09 DIAGNOSIS — R41.82 ALTERED MENTAL STATUS, UNSPECIFIED ALTERED MENTAL STATUS TYPE: ICD-10-CM

## 2018-06-09 DIAGNOSIS — I10 HYPERTENSION GOAL BP (BLOOD PRESSURE) < 140/90: ICD-10-CM

## 2018-06-09 DIAGNOSIS — K76.82 HEPATIC ENCEPHALOPATHY (H): Primary | ICD-10-CM

## 2018-06-09 DIAGNOSIS — K70.31 ALCOHOLIC CIRRHOSIS OF LIVER WITH ASCITES (H): ICD-10-CM

## 2018-06-09 LAB
ALBUMIN SERPL-MCNC: 3 G/DL (ref 3.4–5)
ALBUMIN UR-MCNC: NEGATIVE MG/DL
ALP SERPL-CCNC: 167 U/L (ref 40–150)
ALT SERPL W P-5'-P-CCNC: 33 U/L (ref 0–70)
AMMONIA PLAS-SCNC: 36 UMOL/L (ref 10–50)
AMPHETAMINES UR QL SCN: NEGATIVE
ANION GAP SERPL CALCULATED.3IONS-SCNC: 9 MMOL/L (ref 3–14)
APPEARANCE UR: CLEAR
AST SERPL W P-5'-P-CCNC: 62 U/L (ref 0–45)
BARBITURATES UR QL: NEGATIVE
BASOPHILS # BLD AUTO: 0 10E9/L (ref 0–0.2)
BASOPHILS NFR BLD AUTO: 0.2 %
BENZODIAZ UR QL: NEGATIVE
BILIRUB SERPL-MCNC: 3.2 MG/DL (ref 0.2–1.3)
BILIRUB UR QL STRIP: NEGATIVE
BUN SERPL-MCNC: 13 MG/DL (ref 7–30)
CALCIUM SERPL-MCNC: 8.3 MG/DL (ref 8.5–10.1)
CANNABINOIDS UR QL SCN: NEGATIVE
CHLORIDE SERPL-SCNC: 108 MMOL/L (ref 94–109)
CO2 BLDCOV-SCNC: 26 MMOL/L (ref 21–28)
CO2 SERPL-SCNC: 25 MMOL/L (ref 20–32)
COCAINE UR QL: NEGATIVE
COLOR UR AUTO: YELLOW
CREAT SERPL-MCNC: 1.06 MG/DL (ref 0.66–1.25)
DIFFERENTIAL METHOD BLD: ABNORMAL
EOSINOPHIL # BLD AUTO: 0.6 10E9/L (ref 0–0.7)
EOSINOPHIL NFR BLD AUTO: 10.1 %
ERYTHROCYTE [DISTWIDTH] IN BLOOD BY AUTOMATED COUNT: 18.9 % (ref 10–15)
ETHANOL SERPL-MCNC: <0.01 G/DL
ETHANOL UR QL SCN: NEGATIVE
GFR SERPL CREATININE-BSD FRML MDRD: 73 ML/MIN/1.7M2
GLUCOSE SERPL-MCNC: 116 MG/DL (ref 70–99)
GLUCOSE UR STRIP-MCNC: NEGATIVE MG/DL
HCT VFR BLD AUTO: 37.1 % (ref 40–53)
HGB BLD-MCNC: 12.4 G/DL (ref 13.3–17.7)
HGB UR QL STRIP: NEGATIVE
IMM GRANULOCYTES # BLD: 0 10E9/L (ref 0–0.4)
IMM GRANULOCYTES NFR BLD: 0.3 %
INR PPP: 1.54 (ref 0.86–1.14)
INTERPRETATION ECG - MUSE: NORMAL
KETONES UR STRIP-MCNC: NEGATIVE MG/DL
LACTATE BLD-SCNC: 1.4 MMOL/L (ref 0.7–2.1)
LEUKOCYTE ESTERASE UR QL STRIP: ABNORMAL
LIPASE SERPL-CCNC: 182 U/L (ref 73–393)
LYMPHOCYTES # BLD AUTO: 1.4 10E9/L (ref 0.8–5.3)
LYMPHOCYTES NFR BLD AUTO: 23.7 %
MCH RBC QN AUTO: 29.5 PG (ref 26.5–33)
MCHC RBC AUTO-ENTMCNC: 33.4 G/DL (ref 31.5–36.5)
MCV RBC AUTO: 88 FL (ref 78–100)
MONOCYTES # BLD AUTO: 0.7 10E9/L (ref 0–1.3)
MONOCYTES NFR BLD AUTO: 11.5 %
NEUTROPHILS # BLD AUTO: 3.1 10E9/L (ref 1.6–8.3)
NEUTROPHILS NFR BLD AUTO: 54.2 %
NITRATE UR QL: NEGATIVE
NRBC # BLD AUTO: 0 10*3/UL
NRBC BLD AUTO-RTO: 0 /100
OPIATES UR QL SCN: NEGATIVE
PCO2 BLDV: 37 MM HG (ref 40–50)
PH BLDV: 7.45 PH (ref 7.32–7.43)
PH UR STRIP: 7.5 PH (ref 5–7)
PLATELET # BLD AUTO: 64 10E9/L (ref 150–450)
PO2 BLDV: 29 MM HG (ref 25–47)
POTASSIUM SERPL-SCNC: 3.5 MMOL/L (ref 3.4–5.3)
PROT SERPL-MCNC: 6.7 G/DL (ref 6.8–8.8)
RBC # BLD AUTO: 4.2 10E12/L (ref 4.4–5.9)
RBC #/AREA URNS AUTO: 1 /HPF (ref 0–2)
SAO2 % BLDV FROM PO2: 60 %
SODIUM SERPL-SCNC: 141 MMOL/L (ref 133–144)
SOURCE: ABNORMAL
SP GR UR STRIP: 1.01 (ref 1–1.03)
TROPONIN I SERPL-MCNC: <0.015 UG/L (ref 0–0.04)
TSH SERPL DL<=0.005 MIU/L-ACNC: 1.22 MU/L (ref 0.4–4)
UROBILINOGEN UR STRIP-MCNC: NORMAL MG/DL (ref 0–2)
WBC # BLD AUTO: 5.8 10E9/L (ref 4–11)
WBC #/AREA URNS AUTO: 13 /HPF (ref 0–5)

## 2018-06-09 PROCEDURE — 80053 COMPREHEN METABOLIC PANEL: CPT | Performed by: EMERGENCY MEDICINE

## 2018-06-09 PROCEDURE — 83690 ASSAY OF LIPASE: CPT | Performed by: EMERGENCY MEDICINE

## 2018-06-09 PROCEDURE — 82803 BLOOD GASES ANY COMBINATION: CPT

## 2018-06-09 PROCEDURE — 85025 COMPLETE CBC W/AUTO DIFF WBC: CPT | Performed by: EMERGENCY MEDICINE

## 2018-06-09 PROCEDURE — 25000128 H RX IP 250 OP 636: Performed by: EMERGENCY MEDICINE

## 2018-06-09 PROCEDURE — 70450 CT HEAD/BRAIN W/O DYE: CPT

## 2018-06-09 PROCEDURE — 87086 URINE CULTURE/COLONY COUNT: CPT | Performed by: EMERGENCY MEDICINE

## 2018-06-09 PROCEDURE — 80307 DRUG TEST PRSMV CHEM ANLYZR: CPT | Performed by: EMERGENCY MEDICINE

## 2018-06-09 PROCEDURE — 81001 URINALYSIS AUTO W/SCOPE: CPT | Performed by: EMERGENCY MEDICINE

## 2018-06-09 PROCEDURE — 85610 PROTHROMBIN TIME: CPT | Performed by: EMERGENCY MEDICINE

## 2018-06-09 PROCEDURE — 99285 EMERGENCY DEPT VISIT HI MDM: CPT | Mod: 25

## 2018-06-09 PROCEDURE — 93005 ELECTROCARDIOGRAM TRACING: CPT

## 2018-06-09 PROCEDURE — 84484 ASSAY OF TROPONIN QUANT: CPT | Performed by: EMERGENCY MEDICINE

## 2018-06-09 PROCEDURE — 99285 EMERGENCY DEPT VISIT HI MDM: CPT | Mod: 25 | Performed by: EMERGENCY MEDICINE

## 2018-06-09 PROCEDURE — 80320 DRUG SCREEN QUANTALCOHOLS: CPT | Performed by: EMERGENCY MEDICINE

## 2018-06-09 PROCEDURE — 96365 THER/PROPH/DIAG IV INF INIT: CPT

## 2018-06-09 PROCEDURE — 82140 ASSAY OF AMMONIA: CPT | Performed by: EMERGENCY MEDICINE

## 2018-06-09 PROCEDURE — 83605 ASSAY OF LACTIC ACID: CPT

## 2018-06-09 PROCEDURE — 71046 X-RAY EXAM CHEST 2 VIEWS: CPT

## 2018-06-09 PROCEDURE — 84443 ASSAY THYROID STIM HORMONE: CPT | Performed by: EMERGENCY MEDICINE

## 2018-06-09 PROCEDURE — 93010 ELECTROCARDIOGRAM REPORT: CPT | Mod: Z6 | Performed by: EMERGENCY MEDICINE

## 2018-06-09 RX ORDER — CEFTRIAXONE 1 G/1
1 INJECTION, POWDER, FOR SOLUTION INTRAMUSCULAR; INTRAVENOUS ONCE
Status: COMPLETED | OUTPATIENT
Start: 2018-06-09 | End: 2018-06-09

## 2018-06-09 RX ADMIN — CEFTRIAXONE 1 G: 1 INJECTION, POWDER, FOR SOLUTION INTRAMUSCULAR; INTRAVENOUS at 22:23

## 2018-06-09 ASSESSMENT — ENCOUNTER SYMPTOMS
PALPITATIONS: 0
COLOR CHANGE: 0
SORE THROAT: 0
TROUBLE SWALLOWING: 0
HEADACHES: 0
COUGH: 0
WEAKNESS: 1
LIGHT-HEADEDNESS: 0
HEMATURIA: 0
CHILLS: 0
POLYDIPSIA: 0
VOICE CHANGE: 0
DYSPHORIC MOOD: 0
DIARRHEA: 0
NAUSEA: 1
CONFUSION: 1
ADENOPATHY: 0
DIAPHORESIS: 0
BLOOD IN STOOL: 0
ABDOMINAL PAIN: 0
BACK PAIN: 0
CONSTIPATION: 0
FREQUENCY: 0
VOMITING: 1
NERVOUS/ANXIOUS: 0
FEVER: 0
BRUISES/BLEEDS EASILY: 0
NUMBNESS: 0
DYSURIA: 0
EYE PAIN: 0
NECK PAIN: 0
SHORTNESS OF BREATH: 0
DIZZINESS: 0

## 2018-06-09 NOTE — IP AVS SNAPSHOT
MRN:[id number]                      After Visit Summary   6/9/2018    Ivan Bell    MRN: [id number]           Thank you!     Thank you for choosing [facility] for your care. Our goal is always to provide you with excellent care. Hearing back from our patients is one way we can continue to improve our services. Please take a few minutes to complete the written survey that you may receive in the mail after you visit with us. Thank you!        Patient Information     Date Of Birth          1965        Designated Caregiver       Most Recent Value    Caregiver    Will someone help with your care after discharge? yes    Name of designated caregiver wife     Phone number of caregiver same     Caregiver address same       About your hospital stay     You were admitted on:  Meghan 10, 2018 You last received care in the:  [address] [facility]    You were discharged on:  June 12, 2018        Reason for your hospital stay       You were admitted for confusion due to your liver and constipation                  Who to Call     For medical emergencies, please call 911.  For non-urgent questions about your medical care, please call your primary care provider or clinic, [phone]          Attending Provider     Provider Specialty    Sara Kruse MD Emergency Medicine    Sit, MD Xavier Internal Medicine    [facility], Kang Suarez MD Internal Medicine    , MD Ton Internal Medicine       Primary Care Provider Office Phone # Fax #    Rosette Wills [phone]       When to contact your care team       Call your primary doctor if you have any of the following: confusion or altered mental status that does not respond to extra lactulose.                  After Care Instructions     Activity       Your activity upon discharge: activity as tolerated            Diet       Follow this diet upon discharge: Orders Placed This Encounter  Regular diet            Discharge Instructions        Recommended that patient complete an independent driving assessment PRIOR to returning to driving  There are many organizations that can you can choose from, only one has been listened below.    [facility]   Assessment Services  To make an appointment, call [phone]. For more information, call [phone]    For many of us, being able to drive means independence, mobility and a sense of control in our lives. [facility]'s unique  Assessment and Training service consists of both assessment and training. Since 1978, [facility] has been a leader in  assessment and training for seniors and people with disabilities.            Discharge Instructions       - follow up with primary care in 1 week  - follow up with your liver doctor in 1 week  - take lactulose with goal of 3-4 loose BMs per day, may hold if at goal, give extra for too few BMs or increased confusion  - start taking rifaximin twice daily                  Follow-up Appointments     Adult [facility] Follow-up and recommended labs and tests       Follow up with primary care provider, Rosette Wills, within 7 days to evaluate medication change and for hospital follow- up.  No follow up labs or test are needed.    Follow up with hepatologist, Dr Bales, at [facility] hepatology clinic  Within 7 days to evaluate post-TIPS and hepatic encephalopathy    Appointments on [place] and/or [facility] (with [facility] or [facility] provider or service). Call [phone] if you haven't heard regarding these appointments within 7 days of discharge.                  Your next 10 appointments already scheduled     Jun 25, 2018  8:15 AM CDT   US ABDOMEN/PELVIS DUPLEX COMPLETE with [address] [facility] ([facility])    [address]  1st Floor  [facility] [address] [phone]           Please bring a list of your medicines (including  vitamins, minerals and over-the-counter drugs). Also, tell your doctor about any allergies you may have. Wear comfortable clothes and leave your valuables at home.  Adults: No eating or drinking for 8 hours before the exam. You may take medicine with a small sip of water.  Children: - Children 6+ years: No food or drink for 6 hours before exam. - Children 1-5 years: No food or drink for 4 hours before exam. - Infants, breast-fed: may have breast milk up to 2 hours before exam. - Infants, formula: may have bottle until 4 hours before exam.  Please call the Imaging Department at your exam site with any questions.            Jun 25, 2018  9:00 AM CDT   LAB with  LAB   [facility] ([facility])    [address] [place] [id number] [phone]           Please do not eat 10-12 hours before your appointment if you are coming in fasting for labs on lipids, cholesterol, or glucose (sugar). This does not apply to pregnant women. Water, hot tea and black coffee (with nothing added) are okay. Do not drink other fluids, diet soda or chew gum.            Jul 02, 2018 12:30 PM CDT   (Arrive by 12:15 PM)   Return Visit with Jelani Tristan MD   [facility] Interventional Radiology ([facility])    [address] [place] [id number]   [phone]            Jul 02, 2018  3:15 PM CDT   Lab with  LAB   [facility] ([facility])    [address] [place] [id number] [phone]            Jul 02, 2018  4:30 PM CDT   (Arrive by 4:15 PM)   New General Liver with Gonzalo Miramontes MD   [facility] Hepatology ([facility])    [address] [place] [address]   [phone]            Jul 23, 2018  8:00 AM CDT   Lab with  LAB   [facility] ([facility])    [address] [place] [address]  "[id number]   [phone]            Jul 23, 2018  9:00 AM CDT   (Arrive by 8:45 AM)   Return General Liver with Gonzalo Miramontes MD   [facility] ([facility])    [address]  [address]  [facility] [id number] [phone]              Pending Results     No orders found from 6/7/2018 to 6/10/2018.            Statement of Approval     Ordered          06/12/18 1311  I have reviewed and agree with all the recommendations and orders detailed in this document.  EFFECTIVE NOW     Approved and electronically signed by:  Antwon Light MD             Admission Information     Date & Time Provider Department Dept. Phone    6/9/2018 Ton Javier MD [address] [facility] [phone]      Your Vitals Were     Blood Pressure Pulse Temperature Respirations Height Weight    109/66 (BP Location: Right arm) 64 97.4  F (36.3  C) (Oral) 20 1.778 m (5' 10\") 72.6 kg (160 lb)    Pulse Oximetry BMI (Body Mass Index)                98% 22.96 kg/m2          [facility] [employer] Information     [employer] gives you secure access to your electronic health record. If you see a primary care provider, you can also send messages to your care team and make appointments. If you have questions, please call your primary care clinic.  If you do not have a primary care provider, please call [phone] and they will assist you.        Care EveryWhere ID     This is your Care EveryWhere ID. This could be used by other organizations to access your [facility] medical records  [id number]        Equal Access to Services     KAITLYN CAMACHO : Hadii linnea ku hadasho Sojimyali, waaxda luqadaha, qaybta kaalmada adeegyada, jenny idisalina molina. So [facility] [phone].    ATENCIÓN: Si habla español, tiene a lee disposición servicios gratuitos de asistencia lingüística. Llame al [phone].    We comply with applicable federal civil rights laws and [place] laws. We do not discriminate on the basis of race, " color, national origin, age, disability, sex, sexual orientation, or gender identity.               Review of your medicines      START taking        Dose / Directions    lactulose 20 GM Packet   Commonly known as:  CEPHULAC   Used for:  Hepatic encephalopathy (H)   Replaces:  lactulose 10 GM/15ML solution        Dose:  20 g   Take 1 packet (20 g) by mouth 4 times daily Goal of 3-4 loose BMs per day, extra doses if concerned for confusion or not at goal, may hold for that day if reaches goal   Quantity:  300 each   Refills:  0       rifaximin 550 MG Tabs tablet   Commonly known as:  XIFAXAN   Used for:  Hepatic encephalopathy (H)        Dose:  550 mg   Take 1 tablet (550 mg) by mouth 2 times daily   Quantity:  180 tablet   Refills:  0         CONTINUE these medicines which have NOT CHANGED        Dose / Directions    carvedilol 6.25 MG tablet   Commonly known as:  COREG   Used for:  Secondary esophageal varices without bleeding (H), Alcoholic cirrhosis of liver with ascites (H)        Dose:  6.25 mg   Take 1 tablet (6.25 mg) by mouth 2 times daily (with meals)   Quantity:  180 tablet   Refills:  1       furosemide 20 MG tablet   Commonly known as:  LASIX   Used for:  Alcoholic cirrhosis of liver with ascites (H)        Dose:  60 mg   Take 3 tablets (60 mg) by mouth daily   Quantity:  120 tablet   Refills:  2       MULTIPLE VITAMINS PO        Dose:  1 tablet   Take 1 tablet by mouth daily   Refills:  0       SILDENAFIL CITRATE PO        Dose:  10 mg   Take 10 mg by mouth daily   Refills:  0       spironolactone 100 MG tablet   Commonly known as:  ALDACTONE   Used for:  Alcoholic cirrhosis of liver with ascites (H)        Dose:  150 mg   Take 1.5 tablets (150 mg) by mouth daily   Quantity:  120 tablet   Refills:  2       TYLENOL PO        Take by mouth every 4 hours as needed for mild pain or fever   Refills:  0         STOP taking     lactulose 10 GM/15ML solution   Commonly known as:  CHRONULAC   Replaced by:   lactulose 20 GM Packet           oxyCODONE IR 5 MG tablet   Commonly known as:  ROXICODONE                Where to get your medicines      These medications were sent to [facility] [place] - [place], [place] - [address]  [address], [place] [address]     Phone:  [phone]     rifaximin 550 MG Tabs tablet         Some of these will need a paper prescription and others can be bought over the counter. Ask your nurse if you have questions.     Bring a paper prescription for each of these medications     lactulose 20 GM Packet                Protect others around you: Learn how to safely use, store and throw away your medicines at www.disposemymeds.org.        ANTIBIOTIC INSTRUCTION     You've Been Prescribed an Antibiotic - Now What?  Your healthcare team thinks that you or your loved one might have an infection. Some infections can be treated with antibiotics, which are powerful, life-saving drugs. Like all medications, antibiotics have side effects and should only be used when necessary. There are some important things you should know about your antibiotic treatment.      Your healthcare team may run tests before you start taking an antibiotic.    Your team may take samples (e.g., from your blood, urine or other areas) to run tests to look for bacteria. These test can be important to determine if you need an antibiotic at all and, if you do, which antibiotic will work best.      Within a few days, your healthcare team might change or even stop your antibiotic.    Your team may start you on an antibiotic while they are working to find out what is making you sick.    Your team might change your antibiotic because test results show that a different antibiotic would be better to treat your infection.    In some cases, once your team has more information, they learn that you do not need an antibiotic at all. They may find out that you don't have an infection, or that the antibiotic you're  taking won't work against your infection. For example, an infection caused by a virus can't be treated with antibiotics. Staying on an antibiotic when you don't need it is more likely to be harmful than helpful.      You may experience side effects from your antibiotic.    Like all medications, antibiotics have side effects. Some of these can be serious.    Let you healthcare team know if you have any known allergies when you are admitted to the hospital.    One significant side effect of nearly all antibiotics is the risk of severe and sometimes deadly diarrhea caused by Clostridium difficile (C. Difficile). This occurs when a person takes antibiotics because some good germs are destroyed. Antibiotic use allows C. diificile to take over, putting patients at high risk for this serious infection.    As a patient or caregiver, it is important to understand your or your loved one's antibiotic treatment. It is especially important for caregivers to speak up when patients can't speak for themselves. Here are some important questions to ask your healthcare team.    What infection is this antibiotic treating and how do you know I have that infection?    What side effects might occur from this antibiotic?    How long will I need to take this antibiotic?    Is it safe to take this antibiotic with other medications or supplements (e.g., vitamins) that I am taking?     Are there any special directions I need to know about taking this antibiotic? For example, should I take it with food?    How will I be monitored to know whether my infection is responding to the antibiotic?    What tests may help to make sure the right antibiotic is prescribed for me?      Information provided by:  www.cdc.gov/getsmart  U.S. Department of Health and Human Services  Centers for disease Control and Prevention  National Center for Emerging and Zoonotic Infectious Diseases  Division of Healthcare Quality Promotion             Medication List: This  is a list of all your medications and when to take them. Check marks below indicate your daily home schedule. Keep this list as a reference.      Medications           Morning Afternoon Evening Bedtime As Needed    carvedilol 6.25 MG tablet   Commonly known as:  COREG   Take 1 tablet (6.25 mg) by mouth 2 times daily (with meals)   Last time this was given:  6.25 mg on 6/12/2018  8:24 AM                                furosemide 20 MG tablet   Commonly known as:  LASIX   Take 3 tablets (60 mg) by mouth daily   Last time this was given:  60 mg on 6/12/2018  8:24 AM                                lactulose 20 GM Packet   Commonly known as:  CEPHULAC   Take 1 packet (20 g) by mouth 4 times daily Goal of 3-4 loose BMs per day, extra doses if concerned for confusion or not at goal, may hold for that day if reaches goal   Last time this was given:  20 g on 6/12/2018  8:24 AM                                MULTIPLE VITAMINS PO   Take 1 tablet by mouth daily                                rifaximin 550 MG Tabs tablet   Commonly known as:  XIFAXAN   Take 1 tablet (550 mg) by mouth 2 times daily   Last time this was given:  550 mg on 6/12/2018  8:24 AM                                SILDENAFIL CITRATE PO   Take 10 mg by mouth daily                                spironolactone 100 MG tablet   Commonly known as:  ALDACTONE   Take 1.5 tablets (150 mg) by mouth daily   Last time this was given:  150 mg on 6/12/2018  8:24 AM                                TYLENOL PO   Take by mouth every 4 hours as needed for mild pain or fever

## 2018-06-09 NOTE — LETTER
Transition Communication Hand-off for Care Transitions to Next Level of Care Provider    Name: Ivan Bell  : 1965  MRN #: [id number]  Primary Care Provider: Rosette Wills     Primary Clinic: [address] [place] [address] [place] [id number]     Reason for Hospitalization:    Delirium [R41.0]  Urinary tract infection without hematuria, site unspecified [N39.0]  Altered mental status, unspecified altered mental status type [R41.82]    Admit Date/Time: 2018  6:55 PM  Discharge Date: 2018    Payor Source: Payor: BCBS / Plan: [place]BS [place] / Product Type: Indemnity /     Discharge Needs Assessment:  Needs       Most Recent Value    Equipment Currently Used at Home none    Transportation Available car, family or friend will provide        Follow-up plan:  Future Appointments  Date Time Provider Department Center   2018 6:00 AM Holli Dumont OTR [facility]   2018 8:15 AM [facility]   2018 9:00 AM  LAB [facility]   2018 12:30 PM Jelani Tristan MD [facility]   2018 3:15 PM  LAB [facility]   2018 4:30 PM Gonzalo Wahl MD [facility]   2018 8:00 AM  LAB [facility]   2018 9:00 AM Gonzalo Wahl MD [facility]         Key Recommendations:  Please review     Nina PEÑAN RN PHN  Patient Care Management Coordinator  Constance Valencia 5, and [facility] 5  Phone: [phone] / Pager: [phone]      AVS/Discharge Summary is the source of truth; this is a helpful guide for improved communication of patient story

## 2018-06-09 NOTE — IP AVS SNAPSHOT
[address] [address]    Phone:  [phone]                                       After Visit Summary   6/9/2018    Ivan Bell    MRN: [id number]           After Visit Summary Signature Page     I have received my discharge instructions, and my questions have been answered. I have discussed any challenges I see with this plan with the nurse or doctor.    ..........................................................................................................................................  Patient/Patient Representative Signature      ..........................................................................................................................................  Patient Representative Print Name and Relationship to Patient    ..................................................               ................................................  Date                                            Time    ..........................................................................................................................................  Reviewed by Signature/Title    ...................................................              ..............................................  Date                                                            Time

## 2018-06-09 NOTE — TELEPHONE ENCOUNTER
Patient's wife contacted IR pager on 6/9/2018 in the afternoon. She described Ivan as acting differently and his behavior has changed and he has become more sleepy today. Ivan underwent a successful TIPS placement on 6/7/2018. Since then he took Oxycodone (total of 8 tablets over 2 days) and also he has been on Lactulose. But he was constipated until today that he had a bowel movement after an over the counter Madonna lax.   Patient's wife was strongly encouraged to ensure patient is taking Lactulose and also control constipation aggressively. If patient's symptoms are not controlled they should come to ED.   Patient's wife called second time and after that they were advised to the ED immediately to control the symptoms of encephalopathy.     Catracho Warner MD, MPH  IR Fellow  Pager: [phone]

## 2018-06-10 PROBLEM — G93.41 ACUTE METABOLIC ENCEPHALOPATHY: Status: ACTIVE | Noted: 2018-06-10

## 2018-06-10 PROBLEM — G93.40 ENCEPHALOPATHY ACUTE: Status: ACTIVE | Noted: 2018-06-10

## 2018-06-10 LAB
BACTERIA SPEC CULT: NO GROWTH
INR PPP: 1.59 (ref 0.86–1.14)
Lab: NORMAL
SODIUM SERPL-SCNC: 142 MMOL/L (ref 133–144)
SPECIMEN SOURCE: NORMAL

## 2018-06-10 PROCEDURE — 12000008 ZZH R&B INTERMEDIATE UMMC

## 2018-06-10 PROCEDURE — 25000132 ZZH RX MED GY IP 250 OP 250 PS 637: Performed by: INTERNAL MEDICINE

## 2018-06-10 PROCEDURE — 85610 PROTHROMBIN TIME: CPT | Performed by: INTERNAL MEDICINE

## 2018-06-10 PROCEDURE — 84295 ASSAY OF SERUM SODIUM: CPT | Performed by: INTERNAL MEDICINE

## 2018-06-10 PROCEDURE — G0378 HOSPITAL OBSERVATION PER HR: HCPCS

## 2018-06-10 PROCEDURE — 36415 COLL VENOUS BLD VENIPUNCTURE: CPT | Performed by: INTERNAL MEDICINE

## 2018-06-10 RX ORDER — POTASSIUM CHLORIDE 1.5 G/1.58G
20-40 POWDER, FOR SOLUTION ORAL
Status: DISCONTINUED | OUTPATIENT
Start: 2018-06-10 | End: 2018-06-12 | Stop reason: HOSPADM

## 2018-06-10 RX ORDER — ONDANSETRON 2 MG/ML
4 INJECTION INTRAMUSCULAR; INTRAVENOUS EVERY 6 HOURS PRN
Status: DISCONTINUED | OUTPATIENT
Start: 2018-06-10 | End: 2018-06-12 | Stop reason: HOSPADM

## 2018-06-10 RX ORDER — MAGNESIUM SULFATE HEPTAHYDRATE 40 MG/ML
4 INJECTION, SOLUTION INTRAVENOUS EVERY 4 HOURS PRN
Status: DISCONTINUED | OUTPATIENT
Start: 2018-06-10 | End: 2018-06-12 | Stop reason: HOSPADM

## 2018-06-10 RX ORDER — LACTULOSE 10 G/15ML
20 SOLUTION ORAL
Status: DISCONTINUED | OUTPATIENT
Start: 2018-06-10 | End: 2018-06-12 | Stop reason: HOSPADM

## 2018-06-10 RX ORDER — ACETAMINOPHEN 325 MG/1
325 TABLET ORAL EVERY 4 HOURS PRN
Status: DISCONTINUED | OUTPATIENT
Start: 2018-06-10 | End: 2018-06-12 | Stop reason: HOSPADM

## 2018-06-10 RX ORDER — ONDANSETRON 4 MG/1
4 TABLET, ORALLY DISINTEGRATING ORAL EVERY 6 HOURS PRN
Status: DISCONTINUED | OUTPATIENT
Start: 2018-06-10 | End: 2018-06-12 | Stop reason: HOSPADM

## 2018-06-10 RX ORDER — POTASSIUM CL/LIDO/0.9 % NACL 10MEQ/0.1L
10 INTRAVENOUS SOLUTION, PIGGYBACK (ML) INTRAVENOUS
Status: DISCONTINUED | OUTPATIENT
Start: 2018-06-10 | End: 2018-06-12 | Stop reason: HOSPADM

## 2018-06-10 RX ORDER — LACTULOSE 10 G/15ML
100 SOLUTION ORAL
Status: DISCONTINUED | OUTPATIENT
Start: 2018-06-10 | End: 2018-06-12 | Stop reason: HOSPADM

## 2018-06-10 RX ORDER — POTASSIUM CHLORIDE 7.45 MG/ML
10 INJECTION INTRAVENOUS
Status: DISCONTINUED | OUTPATIENT
Start: 2018-06-10 | End: 2018-06-12 | Stop reason: HOSPADM

## 2018-06-10 RX ORDER — CARVEDILOL 6.25 MG/1
6.25 TABLET ORAL 2 TIMES DAILY WITH MEALS
Status: DISCONTINUED | OUTPATIENT
Start: 2018-06-10 | End: 2018-06-12 | Stop reason: HOSPADM

## 2018-06-10 RX ORDER — NALOXONE HYDROCHLORIDE 0.4 MG/ML
.1-.4 INJECTION, SOLUTION INTRAMUSCULAR; INTRAVENOUS; SUBCUTANEOUS
Status: DISCONTINUED | OUTPATIENT
Start: 2018-06-10 | End: 2018-06-12 | Stop reason: HOSPADM

## 2018-06-10 RX ORDER — POTASSIUM CHLORIDE 750 MG/1
20-40 TABLET, EXTENDED RELEASE ORAL
Status: DISCONTINUED | OUTPATIENT
Start: 2018-06-10 | End: 2018-06-12 | Stop reason: HOSPADM

## 2018-06-10 RX ORDER — SPIRONOLACTONE 50 MG/1
150 TABLET, FILM COATED ORAL DAILY
Status: DISCONTINUED | OUTPATIENT
Start: 2018-06-10 | End: 2018-06-12 | Stop reason: HOSPADM

## 2018-06-10 RX ORDER — POTASSIUM CHLORIDE 29.8 MG/ML
20 INJECTION INTRAVENOUS
Status: DISCONTINUED | OUTPATIENT
Start: 2018-06-10 | End: 2018-06-12 | Stop reason: HOSPADM

## 2018-06-10 RX ADMIN — CARVEDILOL 6.25 MG: 6.25 TABLET, FILM COATED ORAL at 08:51

## 2018-06-10 RX ADMIN — CARVEDILOL 6.25 MG: 6.25 TABLET, FILM COATED ORAL at 18:27

## 2018-06-10 RX ADMIN — SPIRONOLACTONE 150 MG: 100 TABLET, FILM COATED ORAL at 08:51

## 2018-06-10 RX ADMIN — LACTULOSE 20 G: 20 SOLUTION ORAL at 05:03

## 2018-06-10 RX ADMIN — LACTULOSE 20 G: 20 SOLUTION ORAL at 07:08

## 2018-06-10 RX ADMIN — LACTULOSE 20 G: 20 SOLUTION ORAL at 08:51

## 2018-06-10 RX ADMIN — RIFAXIMIN 550 MG: 550 TABLET ORAL at 20:12

## 2018-06-10 RX ADMIN — LACTULOSE 20 G: 20 SOLUTION ORAL at 02:23

## 2018-06-10 RX ADMIN — RIFAXIMIN 550 MG: 550 TABLET ORAL at 08:51

## 2018-06-10 RX ADMIN — FUROSEMIDE 60 MG: 40 TABLET ORAL at 08:50

## 2018-06-10 ASSESSMENT — ACTIVITIES OF DAILY LIVING (ADL)
COGNITION: 0 - NO COGNITION ISSUES REPORTED
TOILETING: 0-->INDEPENDENT
TRANSFERRING: 0-->INDEPENDENT
BATHING: 0-->INDEPENDENT
RETIRED_EATING: 0-->INDEPENDENT
SWALLOWING: 0-->SWALLOWS FOODS/LIQUIDS WITHOUT DIFFICULTY
RETIRED_COMMUNICATION: 0-->UNDERSTANDS/COMMUNICATES WITHOUT DIFFICULTY
AMBULATION: 0-->INDEPENDENT
FALL_HISTORY_WITHIN_LAST_SIX_MONTHS: NO
DRESS: 0-->INDEPENDENT

## 2018-06-10 NOTE — PLAN OF CARE
"Problem: Fall Risk (Adult)  Goal: Identify Related Risk Factors and Signs and Symptoms  Related risk factors and signs and symptoms are identified upon initiation of Human Response Clinical Practice Guideline (CPG).   Outcome: No Change  Pt arrived to 6A at 0100, admitted for altered mental status, d/t hepatic encephalopathy. /63  Pulse 79  Temp 97.9  F (36.6  C) (Axillary)  Resp 16  Ht 1.778 m (5' 10\")  Wt 72.6 kg (160 lb)  SpO2 94%  BMI 22.96 kg/m2. Oriented to self. Neuros difficult to complete full neuro exam d/t pt not following all commands, pt with generalized weakness and confusion, not impulsive this shift, BA on for safety. Denies pain. Incision on upper R thoracic area from recent TIPS sugery. Voiding spont. BS+, no BM this shift, lactolose given x2 per protocol, plan to give again at 0700. PIV SL. Up with A1/GB. Clear liquid diet. Cont to monitor and with POC.         "

## 2018-06-10 NOTE — PLAN OF CARE
Problem: Patient Care Overview  Goal: Plan of Care/Patient Progress Review  Pt VSS. Neuros with mild confusion, needs cueing/redirecting for simple tasks (example washing hands, getting dressed, etc). Steady on feet. L groin site from TIPS WDL. Linens/gown changed multiple times. BM x5, large, loose. PIV SL. Changed to inpatient status. Reg diet, refusing to eat, but denies nausea/pain/abd discomfort. Up in room independently. Door alarm in place d/t confusion, however pt has not attempted to leave room yet. Wife at bedside and supportive of pt. Cont with POC.

## 2018-06-10 NOTE — PLAN OF CARE
Problem: Patient Care Overview  Goal: Plan of Care/Patient Progress Review  OT:  Pt with encephalopathic not following commands.  Will hold OT evaluation until pt clears, nursing agreeable.

## 2018-06-10 NOTE — ED NOTES
[facility], [place]   ED Nurse to Floor Handoff     Ivan Bell is a 52 year old male who speaks English and lives with a spouse,  in a home  They arrived in the ED by car from home    ED Chief Complaint: Altered Mental Status    ED Dx;   Final diagnoses:   Altered mental status, unspecified altered mental status type   Urinary tract infection without hematuria, site unspecified   Delirium         Needed?: No    Allergies:   Allergies   Allergen Reactions     Benadryl [Diphenhydramine] Other (See Comments)     Delirium (visual and auditory hallucinations)   .  Past Medical Hx:   Past Medical History:   Diagnosis Date     Ascites      Cirrhosis (H)      Esophageal varices (H)      Hepatitis      Hypertension      Left calcaneus fracture 1/9/2006 January 16, 2006: Fell 10 feet from ladder onto left foot on frozen ground on 1/9/06 at home.  Immediate pain and unable to walk- seen at [place] and diagnosed with calcaneus fracture     Portal vein thrombosis     left occlusion, partial main      Baseline Mental status: WDL  Current Mental Status changes: other Confused, unknown cause at this time    Infection present or suspected this encounter: yes urinary and cultures pending  Sepsis suspected: No  Isolation type: No active isolations     Activity level - Baseline/Home:  Independent  Activity Level - Current:   Stand with Assist    Bariatric equipment needed?: No    In the ED these meds were given:   Medications   cefTRIAXone (ROCEPHIN) 1 g vial to attach to  mL bag for ADULTS or NS 50 mL bag for PEDS (not administered)       Drips running?  Yes    Home pump  No    Current LDAs  Peripheral IV 06/09/18 Left Upper arm (Active)   Number of days:0       Venous Sheath (Active)   Number of days:3       Midline Catheter Double Lumen (Active)   Number of days:219       Wound 10/31/17 Left;Inner;Lower Thigh Reddened with petechia (Active)   Number of days:221       Wound 10/31/17  Coccyx small hole over coccyx area (Active)   Number of days:221       Incision/Surgical Site 05/14/18 Right Neck (Active)   Number of days:26       Incision/Surgical Site 06/06/18 Right Neck (Active)   Number of days:3       Incision/Surgical Site 06/06/18 Right Chest (Active)   Number of days:3       Labs results:   Labs Ordered and Resulted from Time of ED Arrival Up to the Time of Departure from the ED   CBC WITH PLATELETS DIFFERENTIAL - Abnormal; Notable for the following:        Result Value    RBC Count 4.20 (*)     Hemoglobin 12.4 (*)     Hematocrit 37.1 (*)     RDW 18.9 (*)     Platelet Count 64 (*)     All other components within normal limits   INR - Abnormal; Notable for the following:     INR 1.54 (*)     All other components within normal limits   COMPREHENSIVE METABOLIC PANEL - Abnormal; Notable for the following:     Glucose 116 (*)     Calcium 8.3 (*)     Bilirubin Total 3.2 (*)     Albumin 3.0 (*)     Protein Total 6.7 (*)     Alkaline Phosphatase 167 (*)     AST 62 (*)     All other components within normal limits   ROUTINE UA WITH MICROSCOPIC - Abnormal; Notable for the following:     pH Urine 7.5 (*)     Leukocyte Esterase Urine Small (*)     WBC Urine 13 (*)     All other components within normal limits   ISTAT  GASES LACTATE ROSALEE POCT - Abnormal; Notable for the following:     Ph Venous 7.45 (*)     PCO2 Venous 37 (*)     All other components within normal limits   LIPASE   AMMONIA   TROPONIN I   TSH WITH FREE T4 REFLEX   ALCOHOL ETHYL   DRUG ABUSE SCREEN 6 CHEM DEP URINE ([facility])   ISTAT CG4 GASES LACTATE ROSALEE NURSING POCT   URINE CULTURE AEROBIC BACTERIAL       Imaging Studies:   Recent Results (from the past 24 hour(s))   CT Head w/o Contrast    Narrative    CT HEAD W/O CONTRAST 6/9/2018 8:19 PM    Provided History: AMS;     Comparison: Head CT 2/28/2016.    Technique: Using multidetector thin collimation helical acquisition  technique, axial, coronal and sagittal CT images from the skull  "base  to the vertex were obtained without intravenous contrast.     Findings:    No intracranial hemorrhage, mass effect, midline shift, or abnormal  extra axial fluid collection. No hydrocephalus. Basal cisterns are  patent. Gray-white matter differentiation throughout both cerebral  hemispheres is preserved.    Bony calvarium and visualized facial bones are unremarkable. Paranasal  sinuses and mastoid air cells are clear. Orbits are unremarkable.      Impression    Impression: No acute intracranial pathology.     I have personally reviewed the examination and initial interpretation  and I agree with the findings.    MONALISA LOPEZ MD   XR Chest 2 Views    Narrative    Trace right pleural effusion and adjacent basilar atelectasis and/or  consolidation.       Recent vital signs:   /69  Pulse 82  Temp 98.4  F (36.9  C) (Oral)  Resp 14  Ht 1.778 m (5' 10\")  Wt 72.6 kg (160 lb)  SpO2 95%  BMI 22.96 kg/m2    Cardiac Rhythm: Normal Sinus  Pt needs tele? No  Skin/wound Issues: None    Code Status: Full Code    Pain control: fair    Nausea control: good    Abnormal labs/tests/findings requiring intervention: See Epic    Family present during ED course? Yes   Family Comments/Social Situation comments: Wife and Daughter    Tasks needing completion: None    David Bowens RN  ascom-- [phone] [facility]  [phone] [facility]      "

## 2018-06-10 NOTE — H&P
[facility], [place]  Internal Medicine History and Physical - [facility]       Date of Admission:  6/9/2018    Assessment & Plan:   Mr. Bell is a 52-year-old male with history of alcoholic cirrhosis complicated by ascites, portal hypertensive gastropathy, grade 2 varices, portal vein thrombosis, status post TIPS June 6, 2018 who presents from home with acute encephalopathy in the setting of constipation; suspect hepatic encephalopathy.    # Acute encephalopathy  # Suspect hepatic encephalopathy in the setting of constipation 2/2 opiates  # DDx includes delirium 2/2 surgery + opiates versus UTI  Developed progressive encephalopathy since starting opiates after TIPS procedure.  Has been constipated despite reportedly taking his lactulose and MiraLAX.  Mental slowing and asterixis is compatible with hepatic encephalopathy; normal ammonia level does not rule out hepatic encephalopathy.  UA possibly suggestive of UTI w/ 13 WBC, small LE, alkaline urine. Could also be delirium given recent surgery and opiates. No vomiting, is passing flatus; at this point not concerned for small bowel obstruction. No ascites to tap by bedside US.  -Lactulose protocol  -If no stools by morning, abdominal film  -Urine culture pending; s/p 1g ceftriaxone in ED    Chronic medical problems:  # Cirrhosis with portal hypertension, thrombocytopenia, elevated INR: Continue furosemide 60 mg daily, spironolactone 150 mg daily, carvedilol 6.25 mg daily    # Pain Assessment:  Current Pain Score 6/10/2018   Patient currently in pain? denies   Pain score (0-10) -   Pain location -   Pain descriptors -   Ivan s pain level was assessed and he currently denies pain.      Active Diet Order      Advance Diet as Tolerated: Clear Liquid Diet  Fluids: PO as needed  DVT Prophylaxis: Pneumatic Compression Devices  Code Status: Full Code (discussed w/ pt and wife)    Disposition Plan:   Expected discharge: 2 - 3 days;  "recommended to prior living arrangement once mental status at baseline.     Entered: Calin Shay Meghan 10, 2018     To staff in     Calin Shay  [facility] -> to M5 in [facility]   Pager: [phone]  Please see sticky note for cross cover information    ---------------------------------------------------------------------------------------------    Chief Complaint:   \"I do not know why I am here\"    History is obtained from the patient and patient's spouse    History of Present Illness   Mr. Bell is a 52-year-old male with history of alcoholic cirrhosis complicated by ascites, portal hypertensive gastropathy, grade 2 varices, portal vein thrombosis, status post TIPS June 6, 2018 who presents from home with altered mental status.    The patient had TIPS placement completed June 6 without any complication.  Per the patient's wife, they returned home and was apparently doing quite well until Wednesday night, when he could not sleep due to some shoulder pain ongoing since the surgery, apparently referred pain.  He had been taken Tylenol but his surgical team advised him to switch to oxycodone due to his liver disease.  On Thursday he was prescribed oxycodone and consumed approximately 8 pills over 48 hours.  By Friday he was appearing tired which persisted until yesterday morning when he began to act \"goofy\" --not turning off lights, not flushing the toilet, one time even opening the refrigerator and attempting to walk into it thinking the fridge was his car.  He was also dropping things and shaking. He has not had any falls that the wife is aware of.    The patient himself is quite slow to answer questions.  He does not think he has had a bowel movement since Tuesday of last week despite taking MiraLAX every day.  He usually has a bowel movement twice daily.  His wife is unsure if he has been taking his lactulose as she is does not monitor his med intake. She is unsure when he " last had a bowel movement.  He did have some intermittent nausea, per his wife, but the patient denies any of that currently.  There has been no vomiting.  He continues to pass flatus.  The wife and patient denies any drug use, alcohol use, hallucinations.  The wife says that the last time he acted like this was around the time he was diagnosed with cirrhosis, a few years ago.    The patient denies any headache, fever, chills, dysuria, shortness of breath or chest pain.  He does endorse occasional epigastric pain which she believes proceeds the tips; he has no abdominal pain now.    In the ED he was given ceftriaxone    Review of Systems:   10 point ROS completed and negative unless noted in HPI.    Past Medical History:    I have reviewed this patient's medical history and updated it with pertinent information if needed.   Past Medical History:   Diagnosis Date     Ascites      Cirrhosis (H)      Esophageal varices (H)      Hepatitis      Hypertension      Left calcaneus fracture 1/9/2006 January 16, 2006: Fell 10 feet from ladder onto left foot on frozen ground on 1/9/06 at home.  Immediate pain and unable to walk- seen at [place] and diagnosed with calcaneus fracture     Portal vein thrombosis     left occlusion, partial main      Past Surgical History:   I have reviewed this patient's surgical history and updated it with pertinent information if needed.  Past Surgical History:   Procedure Laterality Date     ANKLE SURGERY Left      COLONOSCOPY N/A 3/31/2016    Procedure: COLONOSCOPY;  Surgeon: Rhys Uriostegui MD;  Location:  GI     ESOPHAGOSCOPY, GASTROSCOPY, DUODENOSCOPY (EGD), COMBINED N/A 3/31/2016    Procedure: COMBINED ESOPHAGOSCOPY, GASTROSCOPY, DUODENOSCOPY (EGD);  Surgeon: Rhys Uriostegui MD;  Location:  GI     ESOPHAGOSCOPY, GASTROSCOPY, DUODENOSCOPY (EGD), COMBINED N/A 3/9/2018    Procedure: COMBINED ESOPHAGOSCOPY, GASTROSCOPY, DUODENOSCOPY (EGD), BIOPSY SINGLE OR  MULTIPLE;  EGD;  Surgeon: Gonzalo Wahl MD;  Location:  GI     KNEE SURGERY Left      KNEE SURGERY Right      SIGMOIDOSCOPY FLEXIBLE N/A 10/31/2017    Procedure: SIGMOIDOSCOPY FLEXIBLE;;  Surgeon: Armaan Adams MD;  Location:  GI     TIPS Procedure  06/06/2018      Social History:   Social History   Substance Use Topics     Smoking status: Former Smoker     Types: Dip, chew, snus or snuff     Quit date: 8/29/1998     Smokeless tobacco: Former User     Quit date: 10/24/2017      Comment: 1 tin per 10 days.     Alcohol use No      Comment: last etoh 2/14/16, did have Odouls ~8/2017     Family History:   I have reviewed this patient's family history and updated it with pertinent information if needed.   Family History   Problem Relation Age of Onset     Family History Negative Mother      Family History Negative Father      Hypertension Father      CEREBROVASCULAR DISEASE Father 87     Breast Cancer Maternal Grandmother      Rheumatoid Arthritis Daughter      Depression Daughter      Cancer - colorectal No family hx of      Prostate Cancer No family hx of      Liver Disease No family hx of      Prior to Admission Medications:     No current facility-administered medications on file prior to encounter.   Current Outpatient Prescriptions on File Prior to Encounter:  Acetaminophen (TYLENOL PO) Take by mouth every 4 hours as needed for mild pain or fever   carvedilol (COREG) 6.25 MG tablet Take 1 tablet (6.25 mg) by mouth 2 times daily (with meals)   furosemide (LASIX) 20 MG tablet Take 3 tablets (60 mg) by mouth daily   lactulose (CHRONULAC) 10 GM/15ML solution Take 30 mLs (20 g) by mouth 3 times daily as needed for constipation (Take as needed to maintain 3-4 bowel movements daily)   MULTIPLE VITAMINS PO Take 1 tablet by mouth daily   oxyCODONE IR (ROXICODONE) 5 MG tablet Take 1-2 tablets (5-10 mg) by mouth every 6 hours as needed for severe pain   SILDENAFIL CITRATE PO Take 10 mg by mouth daily    spironolactone (ALDACTONE) 100 MG tablet Take 1.5 tablets (150 mg) by mouth daily       Allergies:   Allergies   Allergen Reactions     Benadryl [Diphenhydramine] Other (See Comments)     Delirium (visual and auditory hallucinations)       Physical Exam:   Vital Signs: Temp: 98.2  F (36.8  C) Temp src: Oral BP: 117/62 Pulse: 84   Resp: 16 SpO2: 94 % O2 Device: None (Room air)    Weight: 160 lbs 0 oz    General: Pleasant male, laying in bed, slow, delayed speech  HEENT: No Scleral icterus. NCAT, MMM. PERRL, EOMI, temporal atrophy  Cardiac: Normal rate, regular rhythm. No m/r/g. Normal S1, S2.  Pulm: CTAB, no wheezes, or crackles. Normal respiratory effort  Abd: Soft, slightly distended distended, slightly tender diffusely with palpation, no ascites  Skin: No jaundice, No rash  Extremities: No LE edema  Joints: No inflammation noted on joints  Neuro: Oriented to self, location, reports Cruz is the president, cannot name the year, cranial nerves II through XII intact bilaterally.  Slow to follow commands.  Upper and lower extremities with 4-5 strength bilaterally.  Asterixis noted with handgrip bilaterally.  Psych: euthymic mood, full affect    Data   Creatinine 1.06, improving from prior.  Alk phos 167, decreasing since November 2018.  AST slightly elevated to 62.  Hemoglobin 12.4, improving from most recent of 10.3.  Platelets 64, also improving from 38 on June 6.  Ammonia 36, lipase 182, negative troponin.  TSH is normal.  Mildly hypercarbic.  UA with 13 white cells, small leukocyte esterase, negative nitrite.      Chest x-ray with mild right-sided pleural effusion and atelectasis, cannot completely rule out infection.    EKG without ischemic changes.    Completed ultrasound in room, no ascites to tap.

## 2018-06-10 NOTE — PLAN OF CARE
Observation goals    -diagnostic tests and consults completed and resulted: not met  -vital signs normal or at patient baseline: goal met   -tolerating oral intake to maintain hydration: goal met, taking adequate oral intake this shift  -returns to baseline functional status: not met, pt d/o to place, time, situation, unable to follow all commands

## 2018-06-10 NOTE — PROGRESS NOTES
INTERNAL MEDICINE PROGRESS NOTE    Ivan Bell ([id number]) admitted on 6/9/2018  06/10/2018    Changes today:  -d/c oxycodone  -increased lactulose (goal 3-4 BMs/day)  -pt had 4 BMs (6/10)  -2g Na diet      Assessment & Plan:  Mr. Bell is a 51 yo M with a hx of alcoholic cirrhosis complicated by ascites, portal hypertensive gastropathy, grade 2 varices, portal vein thrombosis, s/pt TIPS June 6, 2018 who presents with AMS likely 2/2 hepatic encephalopathy in the setting of constipation.     # Acute encephalopathy  #Hepatic encephalopathy  #Toxic encephalopathy  EtOH cirrhosis complicated by: ascites, portal HTN, varices, , s/p TIPs, thrombocytopenia, elevated INR  #Chronic nonocclusive portal vein thrombus  # Constipation  Pt developed progressive encephalopathy since starting opiates after TIPS procedure. Etiology remains multifactorial d/t opiates and insufficient BMs causing hepatic encephalopathy (likely contributed to by opiate use as well). No signs or symptoms of infection, no GIB, patient has known chronic nonocclusive thrombus of portal vein (since 10/2018).  No alcohol or other drug use, CT head negative, no other metabolic derangements, glucose within normal limits, TSH normal.  Pt does have asterixis on exam. Normal ammonia level does not rule out hepatic encephalopathy. Minimal ascites to tap by bedside US (6/10).   -Lactulose protocol (goal 3-4 BM/day)  -d/c all opiates  -Continue furosemide 60 mg daily, spironolactone 150 mg daily, carvedilol 6.25 mg daily  -monitor electrolytes  -trend INR and Plts  -since 10/30/17 pt has had non-occlusive portal vein thrombus    #Asymptomatic pyuria  UA possibly suggestive of UTI w/ 13 WBC, small LE, alkaline urine. Pt denies dysuria, abdominal/flank pain, afebrile. Ucx negative. Will with hold abx at this time.  -Monitor    #Possible right pleural effusion/atelectasis  CXR: (6/10) Trace right pleural effusion and adjacent basilar atelectasis and/or  "consolidation. Pt is afebrile, no leukocytosis. Denies SOB, CP, cough. Breathing comfortably on room air, CTAB.   -Monitor      FEN: 2g Na diet  Prophylaxis: ambulate  Disposition: 1-2 days, goal 3-4 BMs day, baseline mental status, Hepatology f/u with in 2 weeks  Code Status: Full code    Creed Roddy    I was present with the medical student who participated in the service and in the documentation of the notes.  I have verified the history and personal performed the physical exam medical decision making.  I agree with the assessment and plan of care as documented in the note. My additions are marked in blue. Corrections .    Meghan 10, 2018   Jean Light MD  Internal Medicine, PGY-2  Pager [phone]    ==================================================================    Subjective:  Today pt is doing well. He is short with his answers, mostly one word responses. He states it is June 2018 and his is at the [facility]. He was unable to do this yesterday. Per wife he is still not at his baseline mentation. He has NO confusion at baseline. He works as a  and has no difficulty with his job or ADLs.     No BM for 5-6 days prior to admission. 4 BMs today (6/10) after increasing lactulose. Last opiate dose was morning of (6/9). Pts has 1BM at baseline while on 15g Lactulose once daily. Mental status improved, per wife he does not have any confusion at baseline, he works as a  with out difficulty. On 6/9) pt was unable to identify wife, calling her (Elicia) \"Gurtrude\". He is now oriented to time and place. However according to his wife he is not at his baseline mentation. Nursing states he continues to need prompting with simple tasks such as washing hands.    He denies fever, SOB, cough, neck stiffness, abdominal pain, N/V, skin lesions. He has no other concerns.    Objective:  Most recent vital signs:  /65 (BP Location: Left arm)  Pulse 79  Temp 96.5  F (35.8  C) (Axillary) " " Resp 16  Ht 1.778 m (5' 10\")  Wt 72.6 kg (160 lb)  SpO2 97%  BMI 22.96 kg/m2  Temp:  [96.5  F (35.8  C)-98.4  F (36.9  C)] 96.5  F (35.8  C)  Pulse:  [79-84] 79  Resp:  [14-16] 16  BP: (110-132)/(60-69) 118/65  SpO2:  [94 %-97 %] 97 %  Wt Readings from Last 2 Encounters:   06/09/18 72.6 kg (160 lb)   06/06/18 74.9 kg (165 lb 2 oz)       Intake/Output Summary (Last 24 hours) at 06/10/18 1139  Last data filed at 06/10/18 0700   Gross per 24 hour   Intake              360 ml   Output              575 ml   Net             -215 ml       Physical exam:  General: Patient lying comfortably in bed, NAD  HEENT: no scleral icterus or injection  Cardiac: RRR, no m/r/g appreciated.   Respiratory: CTAB, no wheezes, rhonchi or crackles appreciated.  GI: NABS, NT/ND, no guarding or rebound. R sided chest bandage. No erythema, active bleeding.  Extremities: No LE edema  Skin: No acute lesions appreciated   Neuro: AOx3, answers question with one word anwers, appears irritated.    Labs (Past three days):  CBC-plts 64, since 11/2017 range 38-92  Hgb 12.4, since 11/2017 range 8-10  Bili 3.2, 2.1 on (6/6)  Cr-1.06 (baseline)  INR-1.59 from 1.54 (6/10)  Na-142  Urine drug screen-negative  UA-small LE, 13 WBCs  Ucx-negative  Ammonia-wnl 36  TSH 1.22  Ethanol- <0.01  Lipase-wnl 182    Imaging/procedure results: Reviewed and remarkable for:   CT head: (6/10) No acute intracranial pathology.    CXR: (6/10) Trace right pleural effusion and adjacent basilar atelectasis and/or consolidation.    "

## 2018-06-10 NOTE — ED NOTES
Bed: ED15  Expected date:   Expected time:   Means of arrival:   Comments:  HELD FOR POSSIBLE [facility] TRANSFER

## 2018-06-10 NOTE — PLAN OF CARE
"Observation goals    -diagnostic tests and consults completed and resulted: not met  -vital signs normal or at patient baseline: goal met /62  Pulse 84  Temp 98.2  F (36.8  C) (Oral)  Resp 16  Ht 1.778 m (5' 10\")  Wt 72.6 kg (160 lb)  SpO2 94%  BMI 22.96 kg/m2  -tolerating oral intake to maintain hydration: not met, pt arrived on 6A at 0100, will monitor I&O  -returns to baseline functional status: not met, pt d/o to place, time, situation, unable to follow all commands          "

## 2018-06-10 NOTE — ED PROVIDER NOTES
[place] EMERGENCY DEPARTMENT ([facility])  6/09/18   History     Chief Complaint   Patient presents with     Altered Mental Status     The history is provided by a relative, medical records and the patient. History limited by: Some confusion, but answering all questions.     Ivan Bell is a 52 year old male with a medical history significant for EtOH cirrhosis with ascites and variceal bleeding s/p multiple banding who recently underwent a TIPS procedure on 6/6/2018 and presents to the Emergency Department today for evaluation of altered mental status.  The family brought the patient in as they were concerned about the patient's acute confusion.  The family reports that the patient while hospitalized after the TIPS procedure was complaining of some abdominal pain that radiated up to his shoulder; he was given a dose of oxycodone prior to leaving.  The next day, the patient was still complaining of this pain and he was given a prescription for oxycodone at home.  The family reports that the patient has been constipated over the past few days and he has been taking his oxycodone for pain management.  They believed his confusion was secondary to this; however, they contacted IR and was told to bring the patient in.  The family reports that the patient's confusion started today; he was using the restroom and not flushing and not turning the light off after he left, which is abnormal for him.  The family also reports that they went into the sewing room and the patient was unzipping his pants in the middle of the room, at which point they became concerned about his confusion and contacted IR.  The wife states that when they were getting into the car to come to the Emergency Department, the patient grabbed the door for the freezer in the garage.  The wife then asked the patient what he was doing, the patient said he was getting into the car; the patient then stared at the freezer with the door open.       The family reports that the patient has been taking his medications as prescribed.  The patient does complain of some generalized weakness as well as some mild nausea and vomiting.  Patient denies any pain, denies chest pain or abdominal pain, denies any fevers, loss of consciousness, difficulty breathing, palpitations, blood in his vomit, blood in stool, rashes, urinary symptoms or sore throat.  He also denies any cough.  The family states that the patient has had no other recent changes to his medications besides the oxycodone.    I have reviewed the Medications, Allergies, Past Medical and Surgical History, and Social History in the [facility] system.    Past Medical History:   Diagnosis Date     Ascites      Cirrhosis (H)      Esophageal varices (H)      Hepatitis      Hypertension      Left calcaneus fracture 1/9/2006 January 16, 2006: Fell 10 feet from ladder onto left foot on frozen ground on 1/9/06 at home.  Immediate pain and unable to walk- seen at [place] and diagnosed with calcaneus fracture     Portal vein thrombosis     left occlusion, partial main       Past Surgical History:   Procedure Laterality Date     ANKLE SURGERY Left      COLONOSCOPY N/A 3/31/2016    Procedure: COLONOSCOPY;  Surgeon: Rhys Uriostegui MD;  Location:  GI     ESOPHAGOSCOPY, GASTROSCOPY, DUODENOSCOPY (EGD), COMBINED N/A 3/31/2016    Procedure: COMBINED ESOPHAGOSCOPY, GASTROSCOPY, DUODENOSCOPY (EGD);  Surgeon: Rhys Uriostegui MD;  Location:  GI     ESOPHAGOSCOPY, GASTROSCOPY, DUODENOSCOPY (EGD), COMBINED N/A 3/9/2018    Procedure: COMBINED ESOPHAGOSCOPY, GASTROSCOPY, DUODENOSCOPY (EGD), BIOPSY SINGLE OR MULTIPLE;  EGD;  Surgeon: Gonzalo Wahl MD;  Location:  GI     KNEE SURGERY Left      KNEE SURGERY Right      SIGMOIDOSCOPY FLEXIBLE N/A 10/31/2017    Procedure: SIGMOIDOSCOPY FLEXIBLE;;  Surgeon: Armaan Adams MD;  Location:  GI     TIPS Procedure  06/06/2018       Family History    Problem Relation Age of Onset     Family History Negative Mother      Family History Negative Father      Hypertension Father      CEREBROVASCULAR DISEASE Father 87     Breast Cancer Maternal Grandmother      Rheumatoid Arthritis Daughter      Depression Daughter      Cancer - colorectal No family hx of      Prostate Cancer No family hx of      Liver Disease No family hx of        Social History   Substance Use Topics     Smoking status: Former Smoker     Types: Dip, chew, snus or snuff     Quit date: 8/29/1998     Smokeless tobacco: Former User     Quit date: 10/24/2017      Comment: 1 tin per 10 days.     Alcohol use No      Comment: last etoh 2/14/16, did have [place] ~8/2017       No current facility-administered medications for this encounter.      Current Outpatient Prescriptions   Medication     Acetaminophen (TYLENOL PO)     carvedilol (COREG) 6.25 MG tablet     furosemide (LASIX) 20 MG tablet     lactulose (CHRONULAC) 10 GM/15ML solution     MULTIPLE VITAMINS PO     oxyCODONE IR (ROXICODONE) 5 MG tablet     SILDENAFIL CITRATE PO     spironolactone (ALDACTONE) 100 MG tablet        Allergies   Allergen Reactions     Benadryl [Diphenhydramine] Other (See Comments)     Delirium (visual and auditory hallucinations)         Review of Systems   Reason unable to perform ROS: May be limited by confusion.   Constitutional: Negative for chills, diaphoresis and fever.   HENT: Negative for ear pain, sore throat, tinnitus, trouble swallowing and voice change.    Eyes: Negative for pain and visual disturbance.   Respiratory: Negative for cough and shortness of breath.    Cardiovascular: Negative for chest pain, palpitations and leg swelling.   Gastrointestinal: Positive for nausea and vomiting. Negative for abdominal pain, blood in stool, constipation and diarrhea.   Endocrine: Negative for polydipsia and polyuria.   Genitourinary: Negative for dysuria, frequency, hematuria and urgency.   Musculoskeletal: Negative for  "back pain and neck pain.   Skin: Negative for color change and rash.   Allergic/Immunologic: Negative for immunocompromised state.   Neurological: Positive for weakness (generalized). Negative for dizziness, syncope, light-headedness, numbness and headaches.   Hematological: Negative for adenopathy. Does not bruise/bleed easily.   Psychiatric/Behavioral: Positive for confusion. Negative for dysphoric mood and suicidal ideas. The patient is not nervous/anxious.    All other systems reviewed and are negative.      Physical Exam   BP: 132/69  Pulse: 82  Temp: 98.4  F (36.9  C)  Resp: 14  Height: 177.8 cm (5' 10\")  Weight: 72.6 kg (160 lb)  SpO2: 96 %      Physical Exam   CONSTITUTIONAL: Well-developed and well-nourished. Awake and alert. Non-toxic appearance. No acute distress.   HENT:   - Head: Normocephalic and atraumatic.   - Ears: Hearing and external ear grossly normal.   - Nose: Nose normal. No rhinorrhea. No epistaxis.   - Mouth/Throat: MMM  EYES: Conjunctivae and lids are normal. No scleral icterus.   NECK: Normal range of motion and phonation normal. Neck supple.  No tracheal deviation, no stridor. No edema or erythema noted.  CARDIOVASCULAR: Normal rate, regular rhythm and no appreciable abnormal heart sounds.  PULMONARY/CHEST: Normal work of breathing. No accessory muscle usage or stridor. No respiratory distress.  No appreciable abnormal breath sounds.  ABDOMEN: Soft, non-distended. No tenderness. No rigidity, rebound or guarding.   MUSCULOSKELETAL: Extremities warm and seemingly well perfused. No edema or calf tenderness.  NEUROLOGIC: Awake, alert. Not disoriented.  Normal tone. No seizure activity. GCS 15  SKIN: Skin is warm and dry. No rash noted. No diaphoresis. No pallor.   PSYCHIATRIC: Normal mood and affect. Speech and behavior normal. Thought processes linear. Cognition and memory are normal.       ED Course   7:22 PM  The patient was seen and examined by Sara Kruse MD in [facility].     ED Course "     Procedures             EKG Interpretation:      Interpreted by Sara Kruse MD  Time reviewed: 19:13  Symptoms at time of EKG: AMS   Rhythm: normal sinus   Rate: 77 bpm  Axis: Normal  Ectopy: none  Conduction: normal  ST Segments/ T Waves: No ST-T wave changes and No acute ischemic changes  Comparison to prior: Compared to 6/6/2018: Qrc now 460s from 480s, no significant change in morphology  Clinical Impression: normal EKG           Labs Ordered and Resulted from Time of ED Arrival Up to the Time of Departure from the ED - No data to display         Assessments & Plan (with Medical Decision Making)   IMPRESSION: 82-year-old male, PMH notable for alcoholic liver cirrhosis, S/P TIPS procedure on 6/6/18 (3 days ago), w/ additional PMH notable for hx of portal vein thrombus, previous GIB w/ known esophageal varices, HTN, previous delirium, et al., who presents w/ wife for worsening AMS following the recent TIPS, some missed doses of lactulose/less stooling, along w/ addition of oxycodone, as described further above in the HPI/ROS.     Vitals grossly WNL. Clinically, patient appears nontoxic, NAD, is awake and alert, does appear confused/oriented to person/place only.   Otherwise on examination, has some mild bruising on the upper extremities from IVs, etc. No focal deficits, abdomen benign. Exam otherwise unremarkable.    This sounds to likely be a case of delirium especially given the relatively acute onset, somewhat waxing and waning severity and timing near a recent procedure.  DDX for such includes, but not limited to, hyperammonemia especially given the lack of stooling and somewhat less consistent lactulose dosing, medication effects from oxycodone, metabolic derrangement, occult infection, complication from procedure, et al. Think most likely cause might be from a hyperammonemia with potential narcotic medication effect.   No fevers or findings on exam to make infection more likely.  No history or findings  for trauma to make me think patient would've had an intracranial event/bleed, etc.    PLAN: Labs, urine studies, CXR, may add additional testing if not clear cause identified on initial eval. Will likely need to come in for Obs when having the AMS.     RESULTS:  - Labs: WBC (5.8), Hgb (12.4) and PLT (64) all increased from previous. INR 1.54 (somewhat up form previous, prev 1.43), ammonia normal, lactate normal.   - Urine: Possible UTI (though asymptomatic from urine standpoint), culture pending  - Imaging: Images and written preliminary reports reviewed by myself and revealed:  --- CXR: Possible right pleural effusion with basilar atelectasis and or consolidation (no lung sounds in this area)  --- Head CT: No acute intracranial pathology    INTERVENTIONS:   - IV Ceftriaxone    RE-EVALUATION:  - Pt continues to do well here in the ED, no acute issues or apparent concerning changes in vitals or clinical appearance, but remains confused, though cooperative.    DISCUSSIONS:  - w/ IM: Reviewed case, they will admit for further evaluation/management  - w/ Patient: I have reviewed the available findings, plan with the patient and his family. They expressed understanding and agreement with this plan. All questions answered to the best of our ability at this time.     DISPOSITION/PLANNING:  - IMPRESSION: AMS/confusion/altered behaviors  --- Could be related to liver disease, oxycodone use, possible UTI  - DISPOSITION: Admit to IM for further evaluation/management        ______________________________________________________________________________    - I have reviewed the available nursing notes.      New Prescriptions    No medications on file       Final diagnoses:   None     I, Jamie Levi, am serving as a trained medical scribe to document services personally performed by Sara Kruse MD, based on the provider's statements to me.   I, Sara Kruse MD, was physically present and have reviewed and verified the  accuracy of this note documented by Jamie Lvei.    6/9/2018   [facility], [place], EMERGENCY DEPARTMENT     Sara Kruse MD  06/11/18 [id number]

## 2018-06-10 NOTE — DISCHARGE SUMMARY
[facility], [place]    Internal Medicine Discharge Summary- [facility]    Date of Admission:  6/9/2018  Date of Discharge:  6/12/2018  1:44 PM  Discharging Attending Provider: Dr Ton ROBISON  Discharge Team: [facility] 5    Discharge Diagnoses   Acute encephalopathy  Hepatic encephalopathy  Toxic encephalopathy  Decompensated alcoholic cirrhosis s/p TIPS 6/6  Ascites  Esophageal varices    Follow-ups Needed After Discharge   -Primary care follow-up in 1 week to evaluate compliance with rifaximin and lactulose, mental status  -Follow-up with hepatology in 1-2 weeks to evaluate hepatic encephalopathy in light of recent TIPS.  -Outpatient driving assessment prior to resuming driving    Hospital Course   Ivan Bell has hx decompensated etoh cirrhosis, grade II EVs, ascites, s/p TIPS 6/6, HE who was admitted on 6/9/2018 for acute encephalopathy.  The following problems were addressed during his hospitalization:    # Acute encephalopathy  # Hepatic encephalopathy  # Toxic encephalopathy  Patient presented with acute encephalopathy which is likely due to hepatic as well as toxic encephalopathy (opiates).  Patient had asterixis on exam, normal ammonia.  Patient denies alcohol use, alcohol level negative on presentation, U tox negative.  Patient did not have any signs or symptoms of infection, minimal ascites, too small to perform diagnostic paracentesis.  No other metabolic derangements.  Patient underwent tips 6/6 as below which increases risk of hepatic encephalopathy.  Patient had had suboptimal bowel movements including no bowel movements since 6/5.  This was likely exacerbated by initiation of opiates for post TIPS pain.  Opiates likely contributed to ongoing encephalopathy themselves.  Patient given lactulose with goal of 3-4 loose bowel movements per day, started rifaximin.  Opiates were held.  Patient cleared with lactulose.  Evaluated by OT, patient scored 12/30 on [place], repeat 1 day  later 18/30 indicating acute process.  Patient failed to appropriately answer hypothetical situations and responses initially, by discharge, demonstrated appropriate responses (calling 911 for severe chest pain and shortness of breath, not for stubbing toe).  Unclear whether hepatic encephalopathy will be more difficult to control status post TIPS or whether this was acute worsening due to constipation.  Will need to continue to monitor this over the upcoming months.  -continue lactulose with goal 3-4 loose bowel movements per day, start rifaximin  -Hepatology follow-up within 1-2 weeks to evaluate liver disease post TIPS  -Primary care follow-up within 1 week to evaluate mental status  -OT recommend driving eval prior to resuming driving    # Decompensated alcoholic cirrhosis s/p TIPS 6/6  # Ascites  # Grade II esophageal varices  # Chronic non-occlusive portal vein thrombus  Patient to perform 6/6 for worsened varices as well as ascites management and not a transplant candidate due to low MELD-Na.  Patient has had nonocclusive portal vein thrombus since at least 10/2017.  No evidence of GI bleed, clinically, hemoglobin stable, vitals stable.  As noted above, minimal ascites noted on bedside ultrasound.  -Hepatology follow-up within 1-2 weeks to evaluate liver disease post TIPS    # Discharge Pain Plan:  - Patient currently has NO PAIN and is not being prescribed pain medications on discharge.    Consultations This Hospital Stay   PHYSICAL THERAPY ADULT IP CONSULT  OCCUPATIONAL THERAPY ADULT IP CONSULT    Code Status   Full Code       The patient was discussed with Dr. ENRIQUETA Light  [facility]  Pager: [phone]  ______________________________________________________________________    Physical Exam   Vital Signs: Temp: 97.4  F (36.3  C) Temp src: Oral BP: 109/66 Pulse: 64   Resp: 20 SpO2: 98 % O2 Device: None (Room air)    Weight: 160 lbs 0 oz    General Appearance: Pleasant, no acute  distress  Respiratory: Clear to auscultation bilaterally, breathing comfortably on room air  Cardiovascular: Regular rate and rhythm, no murmurs rubs or gallops  GI: Soft, nontender, nondistended, no fluid wave  Skin: No jaundice or rash appreciated.  Bilateral gynecomastia present  Other: Alert, oriented ×3, no asterixis, able to perform serial sevens, months backwards    Significant Results and Procedures   Most Recent 3 CBC's:  Recent Labs   Lab Test  06/12/18   0755  06/11/18   0636  06/09/18 1926   WBC  6.6  5.7  5.8   HGB  13.1*  11.5*  12.4*   MCV  87  88  88   PLT  65*  50*  64*     Most Recent 3 BMP's:  Recent Labs   Lab Test  06/12/18   0755  06/11/18   0636  06/10/18   0828  06/09/18 1926   NA  141  140  142  141   POTASSIUM  3.8  3.3*   --   3.5   CHLORIDE  108  110*   --   108   CO2  24  20   --   25   BUN  19  18   --   13   CR  1.29*  0.94   --   1.06   ANIONGAP  10  11   --   9   JULISSA  9.1  8.7   --   8.3*   GLC  119*  97   --   116*     Most Recent 2 LFT's:  Recent Labs   Lab Test  06/12/18   0755  06/11/18   0636   AST  46*  41   ALT  27  26   ALKPHOS  196*  169*   BILITOTAL  4.1*  3.1*     Most Recent 3 INR's:  Recent Labs   Lab Test  06/12/18   0755  06/11/18   0636  06/10/18   0828   INR  1.51*  1.61*  1.59*       Pending Results     Unresulted Labs Ordered in the Past 30 Days of this Admission     Date and Time Order Name Status Description    6/6/2018 0850 Platelets prepare order unit In process              Primary Care Physician   Rosette Wills    Discharge Disposition   Discharged to home  Condition at discharge: Stable    Discharge Orders     Discharge Instructions   Recommended that patient complete an independent driving assessment PRIOR to returning to driving  There are many organizations that can you can choose from, only one has been listened below.    [facility]   Assessment Services  To make an appointment, call [phone]. For more information, call  [phone]    For many of us, being able to drive means independence, mobility and a sense of control in our lives. [facility]'s unique  Assessment and Training service consists of both assessment and training. Since 1978, [facility] has been a leader in  assessment and training for seniors and people with disabilities.     Reason for your hospital stay   You were admitted for confusion due to your liver and constipation     Adult [facility] Follow-up and recommended labs and tests   Follow up with primary care provider, Rosette Wills, within 7 days to evaluate medication change and for hospital follow- up.  No follow up labs or test are needed.    Follow up with hepatologist, Dr Bales, at [facility] hepatology clinic  Within 7 days to evaluate post-TIPS and hepatic encephalopathy    Appointments on [place] and/or [facility] (with [facility] provider or service). Call [phone] if you haven't heard regarding these appointments within 7 days of discharge.     Activity   Your activity upon discharge: activity as tolerated     When to contact your care team   Call your primary doctor if you have any of the following: confusion or altered mental status that does not respond to extra lactulose.     Discharge Instructions   - follow up with primary care in 1 week  - follow up with your liver doctor in 1 week  - take lactulose with goal of 3-4 loose BMs per day, may hold if at goal, give extra for too few BMs or increased confusion  - start taking rifaximin twice daily     Full Code     Diet   Follow this diet upon discharge: Orders Placed This Encounter  Regular diet       Discharge Medications   Discharge Medication List as of 6/12/2018  1:22 PM      START taking these medications    Details   rifaximin (XIFAXAN) 550 MG TABS tablet Take 1 tablet (550 mg) by mouth 2 times daily, Disp-180 tablet, R-0, E-Prescribe         CONTINUE these  medications which have CHANGED    Details   lactulose (CEPHULAC) 20 GM Packet Take 1 packet (20 g) by mouth 4 times daily Goal of 3-4 loose BMs per day, extra doses if concerned for confusion or not at goal, may hold for that day if reaches goal, Disp-300 each, R-0, Local Print         CONTINUE these medications which have NOT CHANGED    Details   Acetaminophen (TYLENOL PO) Take by mouth every 4 hours as needed for mild pain or fever, Historical      carvedilol (COREG) 6.25 MG tablet Take 1 tablet (6.25 mg) by mouth 2 times daily (with meals), Disp-180 tablet, R-1, E-Prescribe      furosemide (LASIX) 20 MG tablet Take 3 tablets (60 mg) by mouth daily, Disp-120 tablet, R-2, E-Prescribe      MULTIPLE VITAMINS PO Take 1 tablet by mouth daily, Historical      SILDENAFIL CITRATE PO Take 10 mg by mouth daily, Historical      spironolactone (ALDACTONE) 100 MG tablet Take 1.5 tablets (150 mg) by mouth daily, Disp-120 tablet, R-2, E-Prescribe         STOP taking these medications       lactulose (CHRONULAC) 10 GM/15ML solution Comments:   Reason for Stopping:         oxyCODONE IR (ROXICODONE) 5 MG tablet Comments:   Reason for Stopping:             Allergies   Allergies   Allergen Reactions     Benadryl [Diphenhydramine] Other (See Comments)     Delirium (visual and auditory hallucinations)

## 2018-06-11 ENCOUNTER — APPOINTMENT (OUTPATIENT)
Dept: OCCUPATIONAL THERAPY | Facility: CLINIC | Age: 53
End: 2018-06-11
Attending: INTERNAL MEDICINE
Payer: COMMERCIAL

## 2018-06-11 LAB
ALBUMIN SERPL-MCNC: 3 G/DL (ref 3.4–5)
ALP SERPL-CCNC: 169 U/L (ref 40–150)
ALT SERPL W P-5'-P-CCNC: 26 U/L (ref 0–70)
ANION GAP SERPL CALCULATED.3IONS-SCNC: 11 MMOL/L (ref 3–14)
AST SERPL W P-5'-P-CCNC: 41 U/L (ref 0–45)
BILIRUB DIRECT SERPL-MCNC: 1.1 MG/DL (ref 0–0.2)
BILIRUB SERPL-MCNC: 3.1 MG/DL (ref 0.2–1.3)
BUN SERPL-MCNC: 18 MG/DL (ref 7–30)
CALCIUM SERPL-MCNC: 8.7 MG/DL (ref 8.5–10.1)
CHLORIDE SERPL-SCNC: 110 MMOL/L (ref 94–109)
CO2 SERPL-SCNC: 20 MMOL/L (ref 20–32)
CREAT SERPL-MCNC: 0.94 MG/DL (ref 0.66–1.25)
ERYTHROCYTE [DISTWIDTH] IN BLOOD BY AUTOMATED COUNT: 18.7 % (ref 10–15)
GFR SERPL CREATININE-BSD FRML MDRD: 84 ML/MIN/1.7M2
GLUCOSE SERPL-MCNC: 97 MG/DL (ref 70–99)
HCT VFR BLD AUTO: 35.4 % (ref 40–53)
HGB BLD-MCNC: 11.5 G/DL (ref 13.3–17.7)
INR PPP: 1.61 (ref 0.86–1.14)
MCH RBC QN AUTO: 28.5 PG (ref 26.5–33)
MCHC RBC AUTO-ENTMCNC: 32.5 G/DL (ref 31.5–36.5)
MCV RBC AUTO: 88 FL (ref 78–100)
PHOSPHATE SERPL-MCNC: 3.2 MG/DL (ref 2.5–4.5)
PLATELET # BLD AUTO: 50 10E9/L (ref 150–450)
POTASSIUM SERPL-SCNC: 3.3 MMOL/L (ref 3.4–5.3)
PROT SERPL-MCNC: 6.4 G/DL (ref 6.8–8.8)
RBC # BLD AUTO: 4.04 10E12/L (ref 4.4–5.9)
SODIUM SERPL-SCNC: 140 MMOL/L (ref 133–144)
WBC # BLD AUTO: 5.7 10E9/L (ref 4–11)

## 2018-06-11 PROCEDURE — 84100 ASSAY OF PHOSPHORUS: CPT | Performed by: INTERNAL MEDICINE

## 2018-06-11 PROCEDURE — 99233 SBSQ HOSP IP/OBS HIGH 50: CPT | Mod: GC | Performed by: PEDIATRICS

## 2018-06-11 PROCEDURE — 85610 PROTHROMBIN TIME: CPT | Performed by: INTERNAL MEDICINE

## 2018-06-11 PROCEDURE — 97165 OT EVAL LOW COMPLEX 30 MIN: CPT | Mod: GO

## 2018-06-11 PROCEDURE — 25000132 ZZH RX MED GY IP 250 OP 250 PS 637: Performed by: STUDENT IN AN ORGANIZED HEALTH CARE EDUCATION/TRAINING PROGRAM

## 2018-06-11 PROCEDURE — 25000132 ZZH RX MED GY IP 250 OP 250 PS 637: Performed by: INTERNAL MEDICINE

## 2018-06-11 PROCEDURE — 36415 COLL VENOUS BLD VENIPUNCTURE: CPT | Performed by: INTERNAL MEDICINE

## 2018-06-11 PROCEDURE — 82248 BILIRUBIN DIRECT: CPT | Performed by: INTERNAL MEDICINE

## 2018-06-11 PROCEDURE — 12000001 ZZH R&B MED SURG/OB UMMC

## 2018-06-11 PROCEDURE — 97530 THERAPEUTIC ACTIVITIES: CPT | Mod: GO

## 2018-06-11 PROCEDURE — 85027 COMPLETE CBC AUTOMATED: CPT | Performed by: INTERNAL MEDICINE

## 2018-06-11 PROCEDURE — 40000133 ZZH STATISTIC OT WARD VISIT

## 2018-06-11 PROCEDURE — 80053 COMPREHEN METABOLIC PANEL: CPT | Performed by: INTERNAL MEDICINE

## 2018-06-11 RX ADMIN — CARVEDILOL 6.25 MG: 6.25 TABLET, FILM COATED ORAL at 18:03

## 2018-06-11 RX ADMIN — CARVEDILOL 6.25 MG: 6.25 TABLET, FILM COATED ORAL at 08:32

## 2018-06-11 RX ADMIN — POTASSIUM CHLORIDE 20 MEQ: 750 TABLET, EXTENDED RELEASE ORAL at 08:33

## 2018-06-11 RX ADMIN — LACTULOSE 20 G: 20 SOLUTION ORAL at 12:21

## 2018-06-11 RX ADMIN — LACTULOSE 20 G: 20 POWDER, FOR SOLUTION ORAL at 16:03

## 2018-06-11 RX ADMIN — RIFAXIMIN 550 MG: 550 TABLET ORAL at 08:31

## 2018-06-11 RX ADMIN — RIFAXIMIN 550 MG: 550 TABLET ORAL at 20:25

## 2018-06-11 RX ADMIN — FUROSEMIDE 60 MG: 40 TABLET ORAL at 08:35

## 2018-06-11 RX ADMIN — SPIRONOLACTONE 150 MG: 100 TABLET, FILM COATED ORAL at 08:34

## 2018-06-11 ASSESSMENT — ACTIVITIES OF DAILY LIVING (ADL): PREVIOUS_RESPONSIBILITIES: WORK

## 2018-06-11 NOTE — PROGRESS NOTES
06/11/18 0800   Quick Adds   Type of Visit Initial Occupational Therapy Evaluation   Living Environment   Lives With spouse   Living Arrangements house   Home Accessibility tub/shower is not walk in;stairs to enter home   Number of Stairs to Enter Home 1   Number of Stairs Within Home 12  (does not need to access - pool table only downstairs)   Transportation Available car;family or friend will provide   Living Environment Comment Pt lives in a single-story home with his wife.  1 SHIKHA with no rail. All needs can be met on the main level including laundry.  Pt has a basement with a pool table only.  Bathroom is set up with a tub shower combo with no shower chair.    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  Pt drives and manages his own schedule.  Pt's wife is responsible for med set up and finances.  Pt states that he enjoys golfing.    Functional Level Prior   Ambulation 0-->independent   Transferring 0-->independent   Toileting 0-->independent   Bathing 0-->independent   Dressing 0-->independent   Eating 0-->independent   Communication 0-->understands/communicates without difficulty   Swallowing 0-->swallows foods/liquids without difficulty   Cognition 0 - no cognition issues reported   Fall history within last six months no   Which of the above functional risks had a recent onset or change? cognition   Prior Functional Level Comment Previously IND with ADLs       Present no   Language English    General Information   Onset of Illness/Injury or Date of Surgery - Date 06/09/18   Referring Physician Calin Shay MD   Patient/Family Goals Statement  To return home    Additional Occupational Profile Info/Pertinent History of Current Problem 53 yo M with a hx of alcoholic cirrhosis complicated by ascites, portal hypertensive gastropathy, grade 2 varices, portal vein thrombosis, s/pt TIPS June 6, 2018 who presents with AMS likely 2/2 hepatic encephalopathy in the setting of constipation.   Precautions/Limitations no known precautions/limitations   General Observations Pt was resting in bed upon arrival, agreeable to OT   General Info Comments Activity: Ambulate with assist   Cognitive Status Examination   Orientation person;place;time   Cognitive Comment Pt scores 12/30 on MoCA. Significant deficits in memory, attention, and problem solving impacting safety with returning home without supervision.  Pt needs max A for pathfinding activity.     Visual Perception   Visual Perception Wears glasses;No deficits were identified  (for reading - does not have at hospital )   Sensory Examination   Sensory Quick Adds No deficits were identified   Pain Assessment   Patient Currently in Pain No   Range of Motion (ROM)   ROM Comment WFL    Strength   Strength Comments WFL    Hand Strength   Hand Strength Comments WFL    Coordination   Upper Extremity Coordination No deficits were identified   Transfer Skill: Bed to Chair/Chair to Bed   Level of [place]: Bed to Chair independent   Transfer Skill: Sit to Stand   Level of [place]: Sit/Stand independent   Upper Body Dressing   Level of [place]: Dress Upper Body independent   Instrumental Activities of Daily Living (IADL)   Previous Responsibilities work   IADL Comments Pt's wife is responsible for meds and finances    Activities of Daily Living Analysis   Impairments Contributing to Impaired Activities of Daily Living cognition impaired   General Therapy Interventions   Planned Therapy Interventions cognition;IADL retraining;risk factor education   Clinical Impression   Criteria for Skilled Therapeutic Interventions  "Met yes, treatment indicated   OT Diagnosis Decreased safety and IND with I/ADLs    Influenced by the following impairments Cognitive deficits    Assessment of Occupational Performance 1-3 Performance Deficits   Identified Performance Deficits home managment, IADLs, driving    Clinical Decision Making (Complexity) Low complexity   Therapy Frequency 5 times/wk   Predicted Duration of Therapy Intervention (days/wks) 1 week   Anticipated Discharge Disposition Transitional Care Facility;Home with Assist;Home with Outpatient Therapy   Risks and Benefits of Treatment have been explained. Yes   Patient, Family & other staff in agreement with plan of care Yes   [facility] TM \"6 Clicks\"   2016, Trustees of [facility], under license to [employer].  All rights reserved.   6 Clicks Short Forms Daily Activity Inpatient Short Form   [facility]  \"6 Clicks\" Daily Activity Inpatient Short Form   1. Putting on and taking off regular lower body clothing? 4 - None   2. Bathing (including washing, rinsing, drying)? 4 - None   3. Toileting, which includes using toilet, bedpan or urinal? 4 - None   4. Putting on and taking off regular upper body clothing? 4 - None   5. Taking care of personal grooming such as brushing teeth? 4 - None   6. Eating meals? 4 - None   Daily Activity Raw Score (Score out of 24.Lower scores equate to lower levels of function) 24   Total Evaluation Time   Total Evaluation Time (Minutes) 5     "

## 2018-06-11 NOTE — PLAN OF CARE
Problem: Patient Care Overview  Goal: Discharge Needs Assessment  D/I: patient remains on 6A for monitoring following AMS.  Neuro- unchanged: confused, aphasic, unable to follow commands, needs constant cueing and reorientation for initiation and completion of ADLs.  CV- WNL  Pulm- on RA with O2 sats in mid-upper 90s. LS clear  GI- Tolerated regular diet, BM x 5 today, none since lactulose stopped this afternoon.  - voiding with incontinence, pt refuses showering or changing underwear.  Skin- jaundiced  Gtts- PIV Saline locked  ID- n/a  Labs- WNL  Pain- denied since start of shift  Activity- up independently in room. Door alarm   Drains- n/a  Social- wife at bedside most of day, supportive  CRRT- n/a  See flow sheets for further details and assessments.  A: pt admitted to inpatient, possible DC tomorrow afternoon if pt mentation clears as expected with resumption of regular diet and hydration along with lactulose.  P: continue to monitor, notify MD of significant changes.

## 2018-06-11 NOTE — PROGRESS NOTES
INTERNAL MEDICINE PROGRESS NOTE    Ivan Bell ([id number]) admitted on 6/9/2018 06/11/2018    Changes today:  -scheduled lactulose (goal 3-4 BMs/day)  -pt had 3 BMs (6/11)  -continue 2g Na diet  -OT assessed pt, moca 12/30        Assessment & Plan:  Mr. Bell is a 51 yo M with a hx of alcoholic cirrhosis complicated by ascites, portal hypertensive gastropathy, grade 2 varices, portal vein thrombosis, s/pt TIPS June 6, 2018 who presents with AMS likely 2/2 hepatic encephalopathy in the setting of constipation.      # Acute encephalopathy  #Hepatic encephalopathy  #Toxic encephalopathy  EtOH cirrhosis complicated by: ascites, portal HTN, varices, s/p TIPs, thrombocytopenia, elevated INR  #Chronic nonocclusive portal vein thrombus  # Constipation  Pt developed progressive encephalopathy since starting opiates after TIPS procedure. Etiology remains multifactorial d/t opiates and insufficient BMs causing hepatic encephalopathy (likely contributed to by opiate use as well). No signs or symptoms of infection, no GIB, patient has known chronic nonocclusive thrombus of portal vein (since 10/2018).  No alcohol or other drug use, CT head negative, no other metabolic derangements, glucose within normal limits, TSH normal. Pt does have asterixis on exam, improved from (6/10). Normal ammonia level does not rule out hepatic encephalopathy. Minimal ascites to tap by bedside US (6/10). Overall patient's encephalopathy has improved though concern remains given 12/30 MoCA.  -Lactulose protocol (goal 3-4 BM/day)  -d/c all opiates  -Continue furosemide 60 mg daily, spironolactone 150 mg daily, carvedilol 6.25 mg daily  -monitor electrolytes, replace as needed  -trend INR and Plts  -since 10/30/17 pt has had non-occlusive portal vein thrombus     #Asymptomatic pyuria  UA possibly suggestive of UTI w/ 13 WBC, small LE, alkaline urine. Pt denies dysuria, abdominal/flank pain, afebrile. Ucx negative. Will with hold abx at this  "time.  -Monitor     #Possible right pleural effusion/atelectasis  CXR: (6/10) Trace right pleural effusion and adjacent basilar atelectasis and/or consolidation. Pt is afebrile, no leukocytosis. Denies SOB, CP, cough. Breathing comfortably on room air, CTAB.   -Monitor        FEN: 2g Na diet  Prophylaxis: ambulate  Disposition: 1-2 days, goal 3-4 BMs day, baseline mental status, Hepatology f/u with in 2 weeks  Code Status: Full code     Austen Pereyra, MS4    I was present with the medical student who participated in the service and in the documentation of the notes.  I have verified the history and personal performed the physical exam medical decision making.  I agree with the assessment and plan of care as documented in the note. My additions are marked in blue. Corrections .    June 11, 2018   Jean Light MD  Internal Medicine, PGY-2  Pager [phone]    ==================================================================    Subjective:  Today pt is doing well. His mood is improved today, he is able to have longer conversations and appears to have better insight into his condition. Continues to deny abdominal pain, n/v. No new or worsening symptoms. His mental status continues to improve but per wife he is not at his baseline. OT saw pt today, moca 12/30 and 3/7 on safety questions. Discussed with pt the need to have proper outpatient management, specifically BM monitoring with a goal of 3-4 a day. He was receptive to discussion. No other complaints,    Objective:  Most recent vital signs:  /59  Pulse 80  Temp 98  F (36.7  C) (Oral)  Resp 16  Ht 1.778 m (5' 10\")  Wt 72.6 kg (160 lb)  SpO2 98%  BMI 22.96 kg/m2  Temp:  [96.6  F (35.9  C)-98  F (36.7  C)] 98  F (36.7  C)  Pulse:  [79-87] 80  Resp:  [16] 16  BP: (104-118)/(47-67) 104/59  SpO2:  [96 %-99 %] 98 %  Wt Readings from Last 2 Encounters:   06/09/18 72.6 kg (160 lb)   06/06/18 74.9 kg (165 lb 2 oz)       Intake/Output Summary (Last 24 hours) at " 18 1314  Last data filed at 06/10/18 1400   Gross per 24 hour   Intake              200 ml   Output                0 ml   Net              200 ml       Physical exam:    General: Patient lying comfortably in bed, NAD  HEENT: no scleral icterus or injection  Cardiac: RRR, no m/r/g appreciated.   Respiratory: CTAB, no wheezes, rhonchi or crackles appreciated.  GI: NABS, NT/ND, no guarding or rebound. R sided chest bandage-clean,dry, intact  Extremities: No LE edema  Skin: No acute lesions appreciated. L groin site (TIPs) clean dry intact  Neuro: AOx3, able to tell me who the president is, wifes name, his , multiples of 15, difficulty with naming months in reverse order. Asterixis mainly left hand, improved from 6/10    Labs (Past three days):  CBC-hgb 11.5 < 12.4  Plts 50 < 64  Potassium 3.3 < 3.5  Total bili-3.1  Direct 1.1    INR 1.61    Imaging/procedure results: Reviewed and remarkable for: no new imaging

## 2018-06-11 NOTE — PLAN OF CARE
Problem: Patient Care Overview  Goal: Plan of Care/Patient Progress Review  PT 6A: Deferring PT consult. Acknowledge order placed for PT eval and treat.  Upon chart review and discussion with OT, no acute needs identified requiring skilled PT intervention.     Current level of assist for mobility: Ind from strength and balance standpoint, assistance for pathfinding and limited in cognitive abilities.   Current level of assist for ADLs:  Supervision secondary to cognition.  Barriers to discharge: Cognitive deficits, no 24/7 supervision available from family - this was recommended by OT due to 12/30 on MoCA  Disciplines to follow: OT to address cognition and safety issues.

## 2018-06-11 NOTE — PROGRESS NOTES
Care Coordinator Progress Note    Admission Date/Time:  6/9/2018  Attending MD:  Kang Liang*    Data  Chart reviewed, discussed with interdisciplinary team.   Patient was admitted for:    Altered mental status, unspecified altered mental status type  Urinary tract infection without hematuria, site unspecified  Delirium.    Concerns with insurance coverage for discharge needs: None.  Current Living Situation: Patient lives with spouse.  Support System: Supportive and Involved  Services Involved: none  Transportation at Discharge: Family or friend will provide  Transportation to Medical Appointments: - family can provide  Barriers to Discharge: confusion    Coordination of Care    Referrals: Provided patient/family with referrals for Outpatient Rehab and Outpatient Driving Assessment.        OT recommended outpatient physical therapy and strongly advised that patient complete an outpatient driving assessment (information provided on AVS)    Recommended that patient complete an independent driving assessment PRIOR to returning to driving. There are many organizations that can you can choose from, only one has been listened below.    [facility]   Assessment Services  To make an appointment, call [phone]. For more information, call [phone]    For many of us, being able to drive means independence, mobility and a sense of control in our lives. [facility]'s unique  Assessment and Training service consists of both assessment and training. Since 1978, [facility] has been a leader in  assessment and training for seniors and people with disabilities.    Plan  Anticipated Discharge Date: Tues/Wed TBD pending improvement with confusion  Anticipated Discharge Plan:  Home (pt is refusing TCU)    Nina PEÑAN RN PHN  Patient Care Management Coordinator  [facility]  Phone: [phone] /  Pager: [phone]

## 2018-06-11 NOTE — PLAN OF CARE
Problem: Patient Care Overview  Goal: Plan of Care/Patient Progress Review  Outcome: Improving  Pt is on 6A due to encephalopathy r/t cirrhosis. VSS. Pt has been experiencing some confusion. He was A&amp;Ox4 this morning, but was only oreintedx2 when assessed again at 1600 (pt was disoriented to time and situation; he knew he had recently been hospitalized for TIPS procedure, but could not recall that he is currently here for encephalopathy). Pt denies pain, voids spontaneously; had three BMs today, per pt report. PIV SL. Pt walked in halls with OT this shift. He is tolerating a 2g sodium diet well. He is up independently in room with an activated door alarm for safety. Pt may transfer later this evening to 5A/B to be closer to the [facility] 5 team. Continue to monitor and follow POC.

## 2018-06-11 NOTE — PLAN OF CARE
Problem: Patient Care Overview  Goal: Plan of Care/Patient Progress Review  Outcome: No Change  1900 - 2300: VSS. Disoriented to place, time, & situation. Neuro's unchanged: mild confusion, aphasic, needs constant cueing, reorientation, & redirecting for ADLs, unable to follow all commands. Reg diet. Pt had multiple BMs today. Voiding with incontinence at times. Up independent in room, Door Alarm in place. Possible DC tomorrow if mentation clears as expected. Cont to monitor & POC.

## 2018-06-11 NOTE — PLAN OF CARE
"Problem: Patient Care Overview  Goal: Plan of Care/Patient Progress Review  Discharge Planner OT   Patient plan for discharge: Pt adamant about returning home - does not want to pursue rehab.  Does not have family/friends available for 24 hour assist upon DC.  Would be agreeable to home health/PCA services if he can qualify.   Current status: OT eval completed.  Pt demonstrates IND with functional transfers and mobility without AD.  Administered MoCA to screen for cognitive deficits and pt scores 12/30 (>26/30 is normal).  Pt requires max A for pathfinding activity in the hallway and answers 3/7 questions correctly related to problem solving and safety.  For example, when questioned how he would respond if he spilled hot coffee on his lap, pt states, \"I'd call 911.\"  Pt appears to have some insight into his cognitive deficits, but is not agreeable to facility placement upon DC despite not having assist at home.  Pt's daughter was present for discussion of DC recs and agrees that pt is unsafe to be home alone.  Pt's wife not present.    Barriers to return to prior living situation: Cognitive deficits, decreased insight into consequences of deficits.  Recommendations for discharge: Pt declining TCU, does not have 24 hour caregiver assist available.  Should pt return home, would recommend 24 hour caregiver assist and follow-up OP OT to address cognitive deficits.    Rationale for recommendations: Pt is not safe to return home alone.  Pt presents with deficits in attention, memory, problem solving, and executive function.  Pt's safety would be at risk should he return home without 24 hour supervision.     **Would also recommend that a driving evaluation be completed prior to pt returning to driving.         Entered by: Wes Jones 06/11/2018 10:00 AM           "

## 2018-06-11 NOTE — PLAN OF CARE
Problem: Fall Risk (Adult)  Goal: Identify Related Risk Factors and Signs and Symptoms  Related risk factors and signs and symptoms are identified upon initiation of Human [employer] Clinical Practice Guideline (CPG).   Outcome: No Change  Oriented x4, pt has intermittent confusion. Neuros difficult to complete full neuro exam d/t pt not following all commands, pt with confusion and illogical speech at times. Denies pain. Voiding spont. BS+, no BM this shift. PIV SL. Up with ind in room, door alarm for safety. 2 gram sodium diet. Cont to monitor and with POC.

## 2018-06-12 ENCOUNTER — APPOINTMENT (OUTPATIENT)
Dept: OCCUPATIONAL THERAPY | Facility: CLINIC | Age: 53
End: 2018-06-12
Payer: COMMERCIAL

## 2018-06-12 ENCOUNTER — CARE COORDINATION (OUTPATIENT)
Dept: CARE COORDINATION | Facility: CLINIC | Age: 53
End: 2018-06-12

## 2018-06-12 VITALS
WEIGHT: 160 LBS | TEMPERATURE: 97.4 F | RESPIRATION RATE: 20 BRPM | SYSTOLIC BLOOD PRESSURE: 109 MMHG | DIASTOLIC BLOOD PRESSURE: 66 MMHG | HEART RATE: 64 BPM | HEIGHT: 70 IN | OXYGEN SATURATION: 98 % | BODY MASS INDEX: 22.9 KG/M2

## 2018-06-12 LAB
ALBUMIN SERPL-MCNC: 3.4 G/DL (ref 3.4–5)
ALP SERPL-CCNC: 196 U/L (ref 40–150)
ALT SERPL W P-5'-P-CCNC: 27 U/L (ref 0–70)
ANION GAP SERPL CALCULATED.3IONS-SCNC: 10 MMOL/L (ref 3–14)
AST SERPL W P-5'-P-CCNC: 46 U/L (ref 0–45)
BILIRUB SERPL-MCNC: 4.1 MG/DL (ref 0.2–1.3)
BUN SERPL-MCNC: 19 MG/DL (ref 7–30)
CALCIUM SERPL-MCNC: 9.1 MG/DL (ref 8.5–10.1)
CHLORIDE SERPL-SCNC: 108 MMOL/L (ref 94–109)
CO2 SERPL-SCNC: 24 MMOL/L (ref 20–32)
CREAT SERPL-MCNC: 1.29 MG/DL (ref 0.66–1.25)
ERYTHROCYTE [DISTWIDTH] IN BLOOD BY AUTOMATED COUNT: 19.1 % (ref 10–15)
GFR SERPL CREATININE-BSD FRML MDRD: 58 ML/MIN/1.7M2
GLUCOSE SERPL-MCNC: 119 MG/DL (ref 70–99)
HCT VFR BLD AUTO: 39 % (ref 40–53)
HGB BLD-MCNC: 13.1 G/DL (ref 13.3–17.7)
INR PPP: 1.51 (ref 0.86–1.14)
MAGNESIUM SERPL-MCNC: 2 MG/DL (ref 1.6–2.3)
MCH RBC QN AUTO: 29.2 PG (ref 26.5–33)
MCHC RBC AUTO-ENTMCNC: 33.6 G/DL (ref 31.5–36.5)
MCV RBC AUTO: 87 FL (ref 78–100)
PLATELET # BLD AUTO: 65 10E9/L (ref 150–450)
POTASSIUM SERPL-SCNC: 3.8 MMOL/L (ref 3.4–5.3)
PROT SERPL-MCNC: 7.3 G/DL (ref 6.8–8.8)
RBC # BLD AUTO: 4.48 10E12/L (ref 4.4–5.9)
SODIUM SERPL-SCNC: 141 MMOL/L (ref 133–144)
WBC # BLD AUTO: 6.6 10E9/L (ref 4–11)

## 2018-06-12 PROCEDURE — 25000132 ZZH RX MED GY IP 250 OP 250 PS 637: Performed by: STUDENT IN AN ORGANIZED HEALTH CARE EDUCATION/TRAINING PROGRAM

## 2018-06-12 PROCEDURE — 99239 HOSP IP/OBS DSCHRG MGMT >30: CPT | Mod: GC | Performed by: INTERNAL MEDICINE

## 2018-06-12 PROCEDURE — 84132 ASSAY OF SERUM POTASSIUM: CPT | Performed by: STUDENT IN AN ORGANIZED HEALTH CARE EDUCATION/TRAINING PROGRAM

## 2018-06-12 PROCEDURE — 36415 COLL VENOUS BLD VENIPUNCTURE: CPT | Performed by: STUDENT IN AN ORGANIZED HEALTH CARE EDUCATION/TRAINING PROGRAM

## 2018-06-12 PROCEDURE — 80053 COMPREHEN METABOLIC PANEL: CPT | Performed by: STUDENT IN AN ORGANIZED HEALTH CARE EDUCATION/TRAINING PROGRAM

## 2018-06-12 PROCEDURE — 97530 THERAPEUTIC ACTIVITIES: CPT | Mod: GO

## 2018-06-12 PROCEDURE — 85610 PROTHROMBIN TIME: CPT | Performed by: STUDENT IN AN ORGANIZED HEALTH CARE EDUCATION/TRAINING PROGRAM

## 2018-06-12 PROCEDURE — 25000132 ZZH RX MED GY IP 250 OP 250 PS 637: Performed by: INTERNAL MEDICINE

## 2018-06-12 PROCEDURE — 40000133 ZZH STATISTIC OT WARD VISIT

## 2018-06-12 PROCEDURE — 83735 ASSAY OF MAGNESIUM: CPT | Performed by: STUDENT IN AN ORGANIZED HEALTH CARE EDUCATION/TRAINING PROGRAM

## 2018-06-12 PROCEDURE — 85027 COMPLETE CBC AUTOMATED: CPT | Performed by: STUDENT IN AN ORGANIZED HEALTH CARE EDUCATION/TRAINING PROGRAM

## 2018-06-12 RX ADMIN — CARVEDILOL 6.25 MG: 6.25 TABLET, FILM COATED ORAL at 08:24

## 2018-06-12 RX ADMIN — RIFAXIMIN 550 MG: 550 TABLET ORAL at 08:24

## 2018-06-12 RX ADMIN — SPIRONOLACTONE 150 MG: 100 TABLET, FILM COATED ORAL at 08:24

## 2018-06-12 RX ADMIN — LACTULOSE 20 G: 20 POWDER, FOR SOLUTION ORAL at 08:24

## 2018-06-12 RX ADMIN — FUROSEMIDE 60 MG: 40 TABLET ORAL at 08:24

## 2018-06-12 NOTE — PLAN OF CARE
"Problem: Patient Care Overview  Goal: Plan of Care/Patient Progress Review  Outcome: Improving  BP 98/50 (BP Location: Left arm)  Pulse 66  Temp 98.1  F (36.7  C) (Oral)  Resp 16  Ht 1.778 m (5' 10\")  Wt 72.6 kg (160 lb)  SpO2 98%  BMI 22.96 kg/m2 RA.Pt has been A&O x4 and has been sleeping when roommate has woke him up.Denies any pain or nausea and no BM yet during the night.Will continue to monitor.      "

## 2018-06-12 NOTE — PLAN OF CARE
Problem: Patient Care Overview  Goal: Plan of Care/Patient Progress Review  Outcome: No Change  Pt w/ intermittent confusion. Transferred to  via  after report given to next RN .  Pt stable

## 2018-06-12 NOTE — PROGRESS NOTES
Social Work Services Progress Note    Hospital Day: 4  Collaborated with:  Primary team Constance Davis, patient    Data:  Pt is a 52-year-old male presenting with AMS likely 2/2 hepatic encephalopathy in the setting of constipation.     Intervention:  OT recommending TCU at discharge. Per OT pt not agreeable to TCU placement. Met with pt to confirm this. Pt states that he is planning to return home and does not want to go to a TCU/facility. Pt states he is eager to get back to work. Pt lives with spouse but she works during the days.     Assessment:  Pt declining TCU placement    Plan:    Anticipated Disposition:  Home with services    Barriers to d/c plan:  Medical stability    Follow Up:  SW to follow as needed    DAVID Samayoa, LGSW  5A Unit   Pager [phone]  Phone [phone]

## 2018-06-12 NOTE — PLAN OF CARE
Problem: Patient Care Overview  Goal: Plan of Care/Patient Progress Review  OT5A  Discharge Planner OT   Patient plan for discharge: Home   Current status: Pt completed path finding activity, ambulating 400+' SBA with no AD/AE and no LOB, pt needing Levi to navigate path back to room from 7th floor gym. Pt scored 18/30 on MOCA version 7.2, pt had difficulty with areas of memory, delayed recall, language, attention, and abstraction. Pt receptive to education but still declining d/c to anywhere but home.   Barriers to return to prior living situation: cognitive deficits, decreased insight to safety awareness, medical status   Recommendations for discharge: TCU, however pt not agreeable to this recommend home with supervision and OP OT to assist with cognitive deficits   Rationale for recommendations: Pt would benefit from 24 hour supervision, however not an option 2/2 wife working. Pt not receptive to d/c anywhere but home. Still recommend OP OT to progress cognitive abilities, and supervision at home.        Entered by: Holli Dumont 06/12/2018 8:12 AM         Occupational Therapy Discharge Summary    Reason for therapy discharge:    Discharged to home.    Progress towards therapy goal(s). See goals on Care Plan in [facility] electronic health record for goal details.  Goals partially met.  Barriers to achieving goals:   discharge from facility.    Therapy recommendation(s):    Continued therapy is recommended.  Rationale/Recommendations:  TCU recommended pt refusing however, and would benefit from 24/7 supervision at home and OP  assessment.

## 2018-06-12 NOTE — PLAN OF CARE
Problem: Patient Care Overview  Goal: Plan of Care/Patient Progress Review  Pt arrived from 6A today around 2030. Pt is A&Ox4 and VSS ORA at this time. Pt up independently in room and reports 4 bowel movements today. No signs of encephalopathy. Evening lactulose held. Belongings placed in room closet. Will continue to monitor and follow POC.

## 2018-06-12 NOTE — PROGRESS NOTES
Care Coordinator Progress Note    Admission Date/Time:  6/9/2018  Attending MD:  Kang Liang*    Data  Chart reviewed, discussed with interdisciplinary team.   Patient was admitted for:    Altered mental status, unspecified altered mental status type  Urinary tract infection without hematuria, site unspecified  Delirium.    Concerns with insurance coverage for discharge needs: None.  Current Living Situation: Patient lives with spouse.  Support System: Supportive and Involved  Services Involved: none  Transportation at Discharge: Family or friend will provide  Transportation to Medical Appointments: - family can provide  Barriers to Discharge: none    Coordination of Care    6/12: JANEL acosta again today; although improved MoCA 18/30 recommendation continues to be TCU (patient is still refusing). MD to speak to patient and spouse regarding recommendations as patient is medically to discharge. Medical Team feels patient is okay to d/c home.    6/11: JANEL acosta completed MoCA 12/30. Recommendations is for discharge to TCU (patient adamantly refusing). Secondary recommendation is for patient to be discharged home with 24hr assistance (this is not available as spouse work). Plan to continue increased Lactulose hope for improvment in cognition prior to d/c.    Referrals: Provided patient/family with referrals for Outpatient Rehab and Outpatient Driving Assessment.        OT recommended outpatient physical therapy and strongly advised that patient complete an outpatient driving assessment (information provided on AVS)    Recommended that patient complete an independent driving assessment PRIOR to returning to driving. There are many organizations that can you can choose from, only one has been listened below.    [facility]   Assessment Services  To make an appointment, call [phone]. For more information, call [phone]    For many of us, being able to drive means independence, mobility  and a sense of control in our lives. [facility]'s unique  Assessment and Training service consists of both assessment and training. Since 1978, [facility] has been a leader in  assessment and training for seniors and people with disabilities.    Plan  Anticipated Discharge Date: Tues/Wed  Anticipated Discharge Plan:  Home (pt is refusing TCU)    CTS Handoff Complete: YES    Nina PEÑAN RN PHN  Patient Care Management Coordinator  [facility]  Phone: [phone] / Pager: [phone]

## 2018-06-12 NOTE — PLAN OF CARE
Problem: Patient Care Overview  Goal: Individualization & Mutuality  Outcome: Adequate for Discharge Date Met: 06/12/18  Alert and oriented x 4. Vital signs stable. On room air. Denies pain. Ambulates independently. I/A: Explained care. Reviewed discharge instruction. Removed iv line. Discharged. Patient accompanied by spouse.

## 2018-06-14 ENCOUNTER — CARE COORDINATION (OUTPATIENT)
Dept: GASTROENTEROLOGY | Facility: CLINIC | Age: 53
End: 2018-06-14

## 2018-06-14 NOTE — PROGRESS NOTES
Hepatology post hospital discharge RNCC assessment    Post hospital discharge follow up:      1. Date of Admission:  6/9/2018  2. Admission/discharge diagnosis:  Hepatic encephalopathy s/p TIPS 6/6/18  3. Discharged on:  6/12/2018    Medication Review    1. Medication review completed.    2. Discharge medication changes reviewed.   3. Patient did have new medications prescribed in hospital: Rifaximin    Follow Up Post Discharge    1. Reviewed discharge instructions with patient. Follow up appointments reviewed and transportation to appointments confirmed.   2. Patient able to verbalized understanding of discharge instructions including medications and follow up.      3. Care coordinator role and contact information reviewed, and pt encouraged to call with questions or concerns.     Symptom Review    1.  Ascites:  none  2.  Edema:  none  3.  Jaundice:  none  4.  Encephalopathy:  none  5.  Other:  none    Collaboration    N/A    Plan    1. Continue medications as prescribed  2. Titration of lactulose as needed to maintain 3-5 bowel movements daily.  3. Follow up in hepatology as scheduled 7/2/18 with Dr. Bales  4. Call this writer with questions or concerns.      Patient was given an opportunity to ask questions and have those questions answered.  Patient verbalized understanding of instructions provided and agreed to plan of care.

## 2018-06-18 ENCOUNTER — TELEPHONE (OUTPATIENT)
Dept: GASTROENTEROLOGY | Facility: CLINIC | Age: 53
End: 2018-06-18
Payer: COMMERCIAL

## 2018-06-18 NOTE — TELEPHONE ENCOUNTER
[facility] Call Center    Phone Message    May a detailed message be left on voicemail: yes    Reason for Call: Patient wife called in regarding Hospital F/U he was discharged 06/12 , she wondered if he could come in on June 25 if Dr Bales is in the clinic. He is is a RTN patient   Action Taken: Message routed to:  [facility] HEPATOLOGY

## 2018-06-21 NOTE — TELEPHONE ENCOUNTER
[facility] Call [facility]    Phone Message    May a detailed message be left on voicemail: yes    Reason for Call: Other: Pt's wife returning call about pt's hosp f/u appt, please f/u with the wife's mobile number listed.     Action Taken: Message routed to:  [facility] Hep

## 2018-06-21 NOTE — DISCHARGE SUMMARY
Mr. Bell presented for outpatient TIPS placement.  During TIPS attempt patient found to have thrombosed right portal vein near ostium.  Therefore TIPS could not be placed.  Patient tolereated procedure without complication.  He recovered from anesthesia well and was discharged.  He will return in a couple weeks for re attempt at TIPS placement possibly with transplenic wire or ICE.

## 2018-06-22 NOTE — TELEPHONE ENCOUNTER
Attempted to reach patient's wife, unable to leave message. Dr. Bales is out of the office the week of 6/25 so he will be unable to be seen at all that week. Patient does have an appointment on 7/2, as previously scheduled.

## 2018-06-25 ENCOUNTER — RADIANT APPOINTMENT (OUTPATIENT)
Dept: ULTRASOUND IMAGING | Facility: CLINIC | Age: 53
End: 2018-06-25
Attending: RADIOLOGY
Payer: COMMERCIAL

## 2018-06-25 DIAGNOSIS — I85.00 ESOPHAGEAL VARICES (H): ICD-10-CM

## 2018-06-25 LAB
ALBUMIN SERPL-MCNC: 2.5 G/DL (ref 3.4–5)
ALP SERPL-CCNC: 154 U/L (ref 40–150)
ALT SERPL W P-5'-P-CCNC: 19 U/L (ref 0–70)
ANION GAP SERPL CALCULATED.3IONS-SCNC: 7 MMOL/L (ref 3–14)
AST SERPL W P-5'-P-CCNC: 40 U/L (ref 0–45)
BILIRUB SERPL-MCNC: 1.9 MG/DL (ref 0.2–1.3)
BUN SERPL-MCNC: 11 MG/DL (ref 7–30)
CALCIUM SERPL-MCNC: 8.4 MG/DL (ref 8.5–10.1)
CHLORIDE SERPL-SCNC: 114 MMOL/L (ref 94–109)
CO2 SERPL-SCNC: 22 MMOL/L (ref 20–32)
CREAT SERPL-MCNC: 1.13 MG/DL (ref 0.66–1.25)
ERYTHROCYTE [DISTWIDTH] IN BLOOD BY AUTOMATED COUNT: 20.9 % (ref 10–15)
GFR SERPL CREATININE-BSD FRML MDRD: 67 ML/MIN/1.7M2
GLUCOSE SERPL-MCNC: 80 MG/DL (ref 70–99)
HCT VFR BLD AUTO: 31.3 % (ref 40–53)
HGB BLD-MCNC: 10.1 G/DL (ref 13.3–17.7)
INR PPP: 1.58 (ref 0.86–1.14)
MCH RBC QN AUTO: 30.3 PG (ref 26.5–33)
MCHC RBC AUTO-ENTMCNC: 32.3 G/DL (ref 31.5–36.5)
MCV RBC AUTO: 94 FL (ref 78–100)
PLATELET # BLD AUTO: 50 10E9/L (ref 150–450)
POTASSIUM SERPL-SCNC: 3.6 MMOL/L (ref 3.4–5.3)
PROT SERPL-MCNC: 5.7 G/DL (ref 6.8–8.8)
RBC # BLD AUTO: 3.33 10E12/L (ref 4.4–5.9)
SODIUM SERPL-SCNC: 143 MMOL/L (ref 133–144)
WBC # BLD AUTO: 2.9 10E9/L (ref 4–11)

## 2018-06-29 DIAGNOSIS — K70.31 ALCOHOLIC CIRRHOSIS OF LIVER WITH ASCITES (H): Primary | ICD-10-CM

## 2018-07-02 ENCOUNTER — OFFICE VISIT (OUTPATIENT)
Dept: GASTROENTEROLOGY | Facility: CLINIC | Age: 53
End: 2018-07-02
Attending: INTERNAL MEDICINE
Payer: COMMERCIAL

## 2018-07-02 ENCOUNTER — OFFICE VISIT (OUTPATIENT)
Dept: INTERVENTIONAL RADIOLOGY/VASCULAR | Facility: CLINIC | Age: 53
End: 2018-07-02
Payer: COMMERCIAL

## 2018-07-02 VITALS
TEMPERATURE: 98 F | DIASTOLIC BLOOD PRESSURE: 65 MMHG | HEART RATE: 68 BPM | SYSTOLIC BLOOD PRESSURE: 106 MMHG | BODY MASS INDEX: 24.34 KG/M2 | OXYGEN SATURATION: 100 % | WEIGHT: 169.6 LBS

## 2018-07-02 VITALS — SYSTOLIC BLOOD PRESSURE: 106 MMHG | OXYGEN SATURATION: 100 % | DIASTOLIC BLOOD PRESSURE: 59 MMHG | HEART RATE: 72 BPM

## 2018-07-02 DIAGNOSIS — K70.31 ALCOHOLIC CIRRHOSIS OF LIVER WITH ASCITES (H): ICD-10-CM

## 2018-07-02 DIAGNOSIS — K70.31 ALCOHOLIC CIRRHOSIS OF LIVER WITH ASCITES (H): Primary | ICD-10-CM

## 2018-07-02 PROBLEM — G93.40 ENCEPHALOPATHY ACUTE: Status: RESOLVED | Noted: 2018-06-10 | Resolved: 2018-07-02

## 2018-07-02 PROBLEM — G93.41 ACUTE METABOLIC ENCEPHALOPATHY: Status: RESOLVED | Noted: 2018-06-10 | Resolved: 2018-07-02

## 2018-07-02 LAB
ALBUMIN SERPL-MCNC: 2.7 G/DL (ref 3.4–5)
ALP SERPL-CCNC: 152 U/L (ref 40–150)
ALT SERPL W P-5'-P-CCNC: 18 U/L (ref 0–70)
ANION GAP SERPL CALCULATED.3IONS-SCNC: 9 MMOL/L (ref 3–14)
AST SERPL W P-5'-P-CCNC: 36 U/L (ref 0–45)
BILIRUB DIRECT SERPL-MCNC: 1 MG/DL (ref 0–0.2)
BILIRUB SERPL-MCNC: 2.2 MG/DL (ref 0.2–1.3)
BUN SERPL-MCNC: 14 MG/DL (ref 7–30)
CALCIUM SERPL-MCNC: 8.9 MG/DL (ref 8.5–10.1)
CHLORIDE SERPL-SCNC: 112 MMOL/L (ref 94–109)
CO2 SERPL-SCNC: 23 MMOL/L (ref 20–32)
CREAT SERPL-MCNC: 1.12 MG/DL (ref 0.66–1.25)
ERYTHROCYTE [DISTWIDTH] IN BLOOD BY AUTOMATED COUNT: 20.9 % (ref 10–15)
GFR SERPL CREATININE-BSD FRML MDRD: 68 ML/MIN/1.7M2
GLUCOSE SERPL-MCNC: 153 MG/DL (ref 70–99)
HCT VFR BLD AUTO: 32.5 % (ref 40–53)
HGB BLD-MCNC: 10.5 G/DL (ref 13.3–17.7)
INR PPP: 1.42 (ref 0.86–1.14)
MCH RBC QN AUTO: 31.2 PG (ref 26.5–33)
MCHC RBC AUTO-ENTMCNC: 32.3 G/DL (ref 31.5–36.5)
MCV RBC AUTO: 96 FL (ref 78–100)
PLATELET # BLD AUTO: 51 10E9/L (ref 150–450)
POTASSIUM SERPL-SCNC: 4.1 MMOL/L (ref 3.4–5.3)
PROT SERPL-MCNC: 6.2 G/DL (ref 6.8–8.8)
RBC # BLD AUTO: 3.37 10E12/L (ref 4.4–5.9)
SODIUM SERPL-SCNC: 144 MMOL/L (ref 133–144)
WBC # BLD AUTO: 2.9 10E9/L (ref 4–11)

## 2018-07-02 PROCEDURE — G0463 HOSPITAL OUTPT CLINIC VISIT: HCPCS | Mod: ZF

## 2018-07-02 PROCEDURE — 85610 PROTHROMBIN TIME: CPT | Performed by: INTERNAL MEDICINE

## 2018-07-02 PROCEDURE — 80048 BASIC METABOLIC PNL TOTAL CA: CPT | Performed by: INTERNAL MEDICINE

## 2018-07-02 PROCEDURE — 80076 HEPATIC FUNCTION PANEL: CPT | Performed by: INTERNAL MEDICINE

## 2018-07-02 PROCEDURE — 36415 COLL VENOUS BLD VENIPUNCTURE: CPT | Performed by: INTERNAL MEDICINE

## 2018-07-02 PROCEDURE — 85027 COMPLETE CBC AUTOMATED: CPT | Performed by: INTERNAL MEDICINE

## 2018-07-02 RX ORDER — SPIRONOLACTONE 100 MG/1
100 TABLET, FILM COATED ORAL DAILY
Qty: 120 TABLET | Refills: 2
Start: 2018-07-02 | End: 2018-09-10

## 2018-07-02 RX ORDER — FUROSEMIDE 20 MG
40 TABLET ORAL DAILY
Qty: 120 TABLET | Refills: 2
Start: 2018-07-02 | End: 2018-07-10

## 2018-07-02 ASSESSMENT — ENCOUNTER SYMPTOMS
NERVOUS/ANXIOUS: 0
INSOMNIA: 1
POLYDIPSIA: 1
POLYPHAGIA: 0
WEIGHT LOSS: 0
NIGHT SWEATS: 0
ALTERED TEMPERATURE REGULATION: 0
POOR WOUND HEALING: 0
FATIGUE: 0
DECREASED CONCENTRATION: 0
DECREASED APPETITE: 0
CHILLS: 0
SKIN CHANGES: 0
NAIL CHANGES: 0
WEIGHT GAIN: 1
FEVER: 0
BRUISES/BLEEDS EASILY: 1
PANIC: 0
INCREASED ENERGY: 0
DEPRESSION: 0
SWOLLEN GLANDS: 0
HALLUCINATIONS: 0

## 2018-07-02 ASSESSMENT — PAIN SCALES - GENERAL: PAINLEVEL: NO PAIN (0)

## 2018-07-02 NOTE — PATIENT INSTRUCTIONS
Plan  1.  Reduce spironolactone to 1 tab (100mg per day)  2.  Reduce furosemide to 2 tabs (40mg per day)  3.  Stop carvedilol  4.  Continue lactulose and rifaximin  5.  Follow-up with me in 2 months    Gonzalo Bales MD  Hepatology  [facility]

## 2018-07-02 NOTE — PROGRESS NOTES
[facility]    Hepatology follow-up    Follow-up visit for cirrhosis    Subjective:  52 year old male    Cirrhosis  - ETOH  - hx ascites, TIPS placement 5/14/18, 6/6/18  - hx HE  - hx variceal bleed Oct 2017  - last EGD Apr 2018- medium EV with bandingx4, mild portal hypertensive gastropathy  - HCC screening- Abd U/S Jun 2018    The patient comes to clinic this afternoon with his wife for follow-up of cirrhosis and recent admission for hepatic encephalopathy.  Last clinic visit 04/18/2018.  Since then, the patient underwent an upper endoscopy, which showed medium-sized esophageal varices, which required banding x4.  The patient was referred to Interventional Radiology and underwent an unsuccessful attempt at TIPS 05/14/2018.  He underwent a successful TIPS placement 6/6/2018 with a post-TIPS pressure gradient of 7 mmHg.  The patient was then admitted to the hospital for 2 days for hepatic encephalopathy related to recent TIPS placement and narcotic analgesia.      The patient is doing well today.  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  He denies any problems with confusion or lethargy.  He is currently taking lactulose and rifaximin.  Bowels are moving 4 times per day, and this is not affecting his quality of life.      The patient denies any abdominal distention.  He last underwent paracentesis on the same day as his TIPS last month with 2.5L removed.  He continues to take diuretics at the same doses prior to TIPS placement.  He denies any lower extremity edema.      The patient reports some recent gynecomastia with mild tenderness and sensitivity.  He has noticed this over the last 6 months.      The patient denies jaundice, melena, hematemesis or hematochezia.      No history of fevers, sweats or chills.      Weight has been stable.      The patient does not drink any alcohol.       Medical hx Surgical hx   Past Medical History:   Diagnosis Date      Ascites      Cirrhosis (H)      Esophageal varices (H)      Hepatitis      Hypertension      Left calcaneus fracture 1/9/2006     Portal vein thrombosis       Past Surgical History:   Procedure Laterality Date     ANKLE SURGERY Left      COLONOSCOPY N/A 3/31/2016    Procedure: COLONOSCOPY;  Surgeon: Rhys Uriostegui MD;  Location:  GI     ESOPHAGOSCOPY, GASTROSCOPY, DUODENOSCOPY (EGD), COMBINED N/A 3/31/2016    Procedure: COMBINED ESOPHAGOSCOPY, GASTROSCOPY, DUODENOSCOPY (EGD);  Surgeon: Rhys Uriostegui MD;  Location:  GI     ESOPHAGOSCOPY, GASTROSCOPY, DUODENOSCOPY (EGD), COMBINED N/A 3/9/2018    Procedure: COMBINED ESOPHAGOSCOPY, GASTROSCOPY, DUODENOSCOPY (EGD), BIOPSY SINGLE OR MULTIPLE;  EGD;  Surgeon: Gonzalo Wahl MD;  Location:  GI     KNEE SURGERY Left      KNEE SURGERY Right      SIGMOIDOSCOPY FLEXIBLE N/A 10/31/2017    Procedure: SIGMOIDOSCOPY FLEXIBLE;;  Surgeon: Armaan Adams MD;  Location:  GI     TIPS Procedure  06/06/2018          Medications  Prior to Admission medications    Medication Sig Start Date End Date Taking? Authorizing Provider   Acetaminophen (TYLENOL PO) Take by mouth every 4 hours as needed for mild pain or fever   Yes Reported, Patient   furosemide (LASIX) 20 MG tablet Take 2 tablets (40 mg) by mouth daily 7/2/18  Yes Gonzalo Wahl MD   lactulose (CEPHULAC) 20 GM Packet Take 1 packet (20 g) by mouth 4 times daily Goal of 3-4 loose BMs per day, extra doses if concerned for confusion or not at goal, may hold for that day if reaches goal 6/12/18  Yes Antwon Light MD   MULTIPLE VITAMINS PO Take 1 tablet by mouth daily   Yes Reported, Patient   rifaximin (XIFAXAN) 550 MG TABS tablet Take 1 tablet (550 mg) by mouth 2 times daily 6/12/18  Yes Antwon Light MD   SILDENAFIL CITRATE PO Take 10 mg by mouth daily   Yes Unknown, Entered By History   spironolactone (ALDACTONE) 100 MG tablet Take 1 tablet (100 mg) by mouth daily 7/2/18   Yes Gonzalo Wahl MD       Allergies  Allergies   Allergen Reactions     Benadryl [Diphenhydramine] Other (See Comments)     Delirium (visual and auditory hallucinations)     Oxycodone Other (See Comments)     Delirium and constipation       Review of systems  A 10-point review of systems was negative    Examination  /65  Pulse 68  Temp 98  F (36.7  C) (Oral)  Wt 76.9 kg (169 lb 9.6 oz)  SpO2 100%  BMI 24.34 kg/m2  Body mass index is 24.34 kg/(m^2).    Gen- well, NAD, A+Ox3, normal color  CVS- RRR  Chest- normal air entry, no crackles or wheeze, bilateral gynecomastia  Abd- soft, non-tender, no ascites or organomegaly on palpation or percussion, BS+  Extr- hands normal, no BESSY  Skin- no rash or jaundice  Neuro- no asterixis  Psych- normal mood    Laboratory  Lab Results   Component Value Date     07/02/2018    POTASSIUM 4.1 07/02/2018    CHLORIDE 112 07/02/2018    CO2 23 07/02/2018    BUN 14 07/02/2018    CR 1.12 07/02/2018       Lab Results   Component Value Date    BILITOTAL 2.2 07/02/2018    ALT 18 07/02/2018    AST 36 07/02/2018    ALKPHOS 152 07/02/2018       Lab Results   Component Value Date    ALBUMIN 2.7 07/02/2018    PROTTOTAL 6.2 07/02/2018        Lab Results   Component Value Date    WBC 2.9 07/02/2018    HGB 10.5 07/02/2018    MCV 96 07/02/2018    PLT 51 07/02/2018       Lab Results   Component Value Date    INR 1.42 07/02/2018       Radiology  IR TIPS 5/14/18, 6/6/18 reviewed  Abd U/S 6/25/18 reviewed    Assessment  52 year old male with history of decompensated alcoholic cirrhosis complicated with hepatic encephalopathy, ascites and variceal bleeding who presents for follow-up after recent admission to hospital for hepatic encephalopathy related to TIPS placement and narcotic analgesia.  MELD-Na= 14 (stable).  Last ETOH= Oct 2017.  No evidence of hepatic encephalopathy on lactulose and rifaximin.  Will reduce dose of diuretics and discontinue carvedilol following recent  successful TIPS placement.  Dose reduction and eventual cessation of spironolactone will also help gynecomastia.  Up to date with HCC screening.  No longer needs upper endoscopy for surveillance of esophageal varices following TIPS placement.    Plan  1.  Continue ETOH abstinence  2.  Low Na diet  3.  Discontinue carvedilol  4.  Reduce furosemide to 40mg PO Q24  5.  Reduce spironolactone to 100mg PO Q24  6.  Continue lactulose  7.  Continue rifaximin for now  8.  Follow-up in 2 months    Gonzalo Bales MD  Hepatology  [facility]

## 2018-07-02 NOTE — LETTER
7/2/2018      RE: Ivan Bell  [id number] [address] [id number]       [facility]    Hepatology follow-up    Follow-up visit for cirrhosis    Subjective:  52 year old male    Cirrhosis  - ETOH  - hx ascites, TIPS placement 5/14/18, 6/6/18  - hx HE  - hx variceal bleed Oct 2017  - last EGD Apr 2018- medium EV with bandingx4, mild portal hypertensive gastropathy  - HCC screening- Abd U/S Jun 2018    The patient comes to clinic this afternoon with his wife for follow-up of cirrhosis and recent admission for hepatic encephalopathy.  Last clinic visit 04/18/2018.  Since then, the patient underwent an upper endoscopy, which showed medium-sized esophageal varices, which required banding x4.  The patient was referred to Interventional Radiology and underwent an unsuccessful attempt at TIPS 05/14/2018.  He underwent a successful TIPS placement 6/6/2018 with a post-TIPS pressure gradient of 7 mmHg.  The patient was then admitted to the hospital for 2 days for hepatic encephalopathy related to recent TIPS placement and narcotic analgesia.      The patient is doing well today.  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  He denies any problems with confusion or lethargy.  He is currently taking lactulose and rifaximin.  Bowels are moving 4 times per day, and this is not affecting his quality of life.      The patient denies any abdominal distention.  He last underwent paracentesis on the same day as his TIPS last month with 2.5L removed.  He continues to take diuretics at the same doses prior to TIPS placement.  He denies any lower extremity edema.      The patient reports some recent gynecomastia with mild tenderness and sensitivity.  He has noticed this over the last 6 months.      The patient denies jaundice, melena, hematemesis or hematochezia.      No history of fevers, sweats or chills.      Weight has been stable.      The patient does not drink  any alcohol.       Medical hx Surgical hx   Past Medical History:   Diagnosis Date     Ascites      Cirrhosis (H)      Esophageal varices (H)      Hepatitis      Hypertension      Left calcaneus fracture 1/9/2006     Portal vein thrombosis       Past Surgical History:   Procedure Laterality Date     ANKLE SURGERY Left      COLONOSCOPY N/A 3/31/2016    Procedure: COLONOSCOPY;  Surgeon: Rhys Uriostegui MD;  Location:  GI     ESOPHAGOSCOPY, GASTROSCOPY, DUODENOSCOPY (EGD), COMBINED N/A 3/31/2016    Procedure: COMBINED ESOPHAGOSCOPY, GASTROSCOPY, DUODENOSCOPY (EGD);  Surgeon: Rhys Uriostegui MD;  Location:  GI     ESOPHAGOSCOPY, GASTROSCOPY, DUODENOSCOPY (EGD), COMBINED N/A 3/9/2018    Procedure: COMBINED ESOPHAGOSCOPY, GASTROSCOPY, DUODENOSCOPY (EGD), BIOPSY SINGLE OR MULTIPLE;  EGD;  Surgeon: Gonzalo Wahl MD;  Location:  GI     KNEE SURGERY Left      KNEE SURGERY Right      SIGMOIDOSCOPY FLEXIBLE N/A 10/31/2017    Procedure: SIGMOIDOSCOPY FLEXIBLE;;  Surgeon: Armaan Adams MD;  Location:  GI     TIPS Procedure  06/06/2018          Medications  Prior to Admission medications    Medication Sig Start Date End Date Taking? Authorizing Provider   Acetaminophen (TYLENOL PO) Take by mouth every 4 hours as needed for mild pain or fever   Yes Reported, Patient   furosemide (LASIX) 20 MG tablet Take 2 tablets (40 mg) by mouth daily 7/2/18  Yes Gonzalo Wahl MD   lactulose (CEPHULAC) 20 GM Packet Take 1 packet (20 g) by mouth 4 times daily Goal of 3-4 loose BMs per day, extra doses if concerned for confusion or not at goal, may hold for that day if reaches goal 6/12/18  Yes Antwon Light MD   MULTIPLE VITAMINS PO Take 1 tablet by mouth daily   Yes Reported, Patient   rifaximin (XIFAXAN) 550 MG TABS tablet Take 1 tablet (550 mg) by mouth 2 times daily 6/12/18  Yes Antwon Light MD   SILDENAFIL CITRATE PO Take 10 mg by mouth daily   Yes Unknown, Entered By History    spironolactone (ALDACTONE) 100 MG tablet Take 1 tablet (100 mg) by mouth daily 7/2/18  Yes Gonzalo Wahl MD       Allergies  Allergies   Allergen Reactions     Benadryl [Diphenhydramine] Other (See Comments)     Delirium (visual and auditory hallucinations)     Oxycodone Other (See Comments)     Delirium and constipation       Review of systems  A 10-point review of systems was negative    Examination  /65  Pulse 68  Temp 98  F (36.7  C) (Oral)  Wt 76.9 kg (169 lb 9.6 oz)  SpO2 100%  BMI 24.34 kg/m2  Body mass index is 24.34 kg/(m^2).    Gen- well, NAD, A+Ox3, normal color  CVS- RRR  Chest- normal air entry, no crackles or wheeze, bilateral gynecomastia  Abd- soft, non-tender, no ascites or organomegaly on palpation or percussion, BS+  Extr- hands normal, no BESSY  Skin- no rash or jaundice  Neuro- no asterixis  Psych- normal mood    Laboratory  Lab Results   Component Value Date     07/02/2018    POTASSIUM 4.1 07/02/2018    CHLORIDE 112 07/02/2018    CO2 23 07/02/2018    BUN 14 07/02/2018    CR 1.12 07/02/2018       Lab Results   Component Value Date    BILITOTAL 2.2 07/02/2018    ALT 18 07/02/2018    AST 36 07/02/2018    ALKPHOS 152 07/02/2018       Lab Results   Component Value Date    ALBUMIN 2.7 07/02/2018    PROTTOTAL 6.2 07/02/2018        Lab Results   Component Value Date    WBC 2.9 07/02/2018    HGB 10.5 07/02/2018    MCV 96 07/02/2018    PLT 51 07/02/2018       Lab Results   Component Value Date    INR 1.42 07/02/2018       Radiology  IR TIPS 5/14/18, 6/6/18 reviewed  Abd U/S 6/25/18 reviewed    Assessment  52 year old male with history of decompensated alcoholic cirrhosis complicated with hepatic encephalopathy, ascites and variceal bleeding who presents for follow-up after recent admission to hospital for hepatic encephalopathy related to TIPS placement and narcotic analgesia.  MELD-Na= 14 (stable).  Last ETOH= Oct 2017.  No evidence of hepatic encephalopathy on lactulose and  rifaximin.  Will reduce dose of diuretics and discontinue carvedilol following recent successful TIPS placement.  Dose reduction and eventual cessation of spironolactone will also help gynecomastia.  Up to date with HCC screening.  No longer needs upper endoscopy for surveillance of esophageal varices following TIPS placement.    Plan  1.  Continue ETOH abstinence  2.  Low Na diet  3.  Discontinue carvedilol  4.  Reduce furosemide to 40mg PO Q24  5.  Reduce spironolactone to 100mg PO Q24  6.  Continue lactulose  7.  Continue rifaximin for now  8.  Follow-up in 2 months    Gonzalo Bales MD  Hepatology  [facility]

## 2018-07-02 NOTE — NURSING NOTE
Chief Complaint   Patient presents with     RECHECK     Alcoholic cirrhosis of liver with ascites     /65  Pulse 68  Temp 98  F (36.7  C) (Oral)  Wt 76.9 kg (169 lb 9.6 oz)  SpO2 100%  BMI 24.34 kg/m2  Ivonne Ogden, SMA

## 2018-07-02 NOTE — PROGRESS NOTES
S: Mr. 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  He was treated as he was failing variceal banding therapy.  He did suffer an episode of HE after TIPS placement requiring a hospital visit, however, is doing well after lactulose was started.  He reports having 4 bowel movements a day.  He denies jaundice, ascites, or lower extremity edema.      O:  /59  Pulse 72  SpO2 100%    Last Comprehensive Metabolic Panel:  Sodium   Date Value Ref Range Status   06/25/2018 143 133 - 144 mmol/L Final     Potassium   Date Value Ref Range Status   06/25/2018 3.6 3.4 - 5.3 mmol/L Final     Chloride   Date Value Ref Range Status   06/25/2018 114 (H) 94 - 109 mmol/L Final     Carbon Dioxide   Date Value Ref Range Status   06/25/2018 22 20 - 32 mmol/L Final     Anion Gap   Date Value Ref Range Status   06/25/2018 7 3 - 14 mmol/L Final     Glucose   Date Value Ref Range Status   06/25/2018 80 70 - 99 mg/dL Final     Urea Nitrogen   Date Value Ref Range Status   06/25/2018 11 7 - 30 mg/dL Final     Creatinine   Date Value Ref Range Status   06/25/2018 1.13 0.66 - 1.25 mg/dL Final     GFR Estimate   Date Value Ref Range Status   06/25/2018 67 >60 mL/min/1.7m2 Final     Comment:     Non  GFR Calc     Calcium   Date Value Ref Range Status   06/25/2018 8.4 (L) 8.5 - 10.1 mg/dL Final     Bilirubin Total   Date Value Ref Range Status   06/25/2018 1.9 (H) 0.2 - 1.3 mg/dL Final     Alkaline Phosphatase   Date Value Ref Range Status   06/25/2018 154 (H) 40 - 150 U/L Final     ALT   Date Value Ref Range Status   06/25/2018 19 0 - 70 U/L Final     AST   Date Value Ref Range Status   06/25/2018 40 0 - 45 U/L Final     I reviewed the US from 6/25/18 which demonstrated no concerning flow abnormalities.    A/P:  Mr. Bell is doing well 1 month after TIPS.  He did have significant HE which appears to be well controlled after lactulose.  We will  see him back again in approximately 2 months.

## 2018-07-02 NOTE — LETTER
7/2/2018       RE: Ivan Bell  [address] [address] [facility] [address]     Dear Colleague,    Thank you for referring your patient, Ivan Bell, to the [facility] INTERVENTIONAL RADIOLOGY at [facility]. Please see a copy of my visit note below.    S: Mr. 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  He was treated as he was failing variceal banding therapy.  He did suffer an episode of HE after TIPS placement requiring a hospital visit, however, is doing well after lactulose was started.  He reports having 4 bowel movements a day.  He denies jaundice, ascites, or lower extremity edema.      O:  /59  Pulse 72  SpO2 100%    Last Comprehensive Metabolic Panel:  Sodium   Date Value Ref Range Status   06/25/2018 143 133 - 144 mmol/L Final     Potassium   Date Value Ref Range Status   06/25/2018 3.6 3.4 - 5.3 mmol/L Final     Chloride   Date Value Ref Range Status   06/25/2018 114 (H) 94 - 109 mmol/L Final     Carbon Dioxide   Date Value Ref Range Status   06/25/2018 22 20 - 32 mmol/L Final     Anion Gap   Date Value Ref Range Status   06/25/2018 7 3 - 14 mmol/L Final     Glucose   Date Value Ref Range Status   06/25/2018 80 70 - 99 mg/dL Final     Urea Nitrogen   Date Value Ref Range Status   06/25/2018 11 7 - 30 mg/dL Final     Creatinine   Date Value Ref Range Status   06/25/2018 1.13 0.66 - 1.25 mg/dL Final     GFR Estimate   Date Value Ref Range Status   06/25/2018 67 >60 mL/min/1.7m2 Final     Comment:     Non  GFR Calc     Calcium   Date Value Ref Range Status   06/25/2018 8.4 (L) 8.5 - 10.1 mg/dL Final     Bilirubin Total   Date Value Ref Range Status   06/25/2018 1.9 (H) 0.2 - 1.3 mg/dL Final     Alkaline Phosphatase   Date Value Ref Range Status   06/25/2018 154 (H) 40 - 150 U/L Final     ALT   Date Value Ref Range Status   06/25/2018 19 0 - 70 U/L Final     AST    Date Value Ref Range Status   06/25/2018 40 0 - 45 U/L Final     I reviewed the US from 6/25/18 which demonstrated no concerning flow abnormalities.    A/P:  Mr. Bell is doing well 1 month after TIPS.  He did have significant HE which appears to be well controlled after lactulose.  We will see him back again in approximately 2 months.      Again, thank you for allowing me to participate in the care of your patient.      Sincerely,    Jelani Tristan MD

## 2018-07-02 NOTE — MR AVS SNAPSHOT
After Visit Summary   7/2/2018    Ivan Bell    MRN: [id number]           Patient Information     Date Of Birth          1965        Visit Information        Provider Department      7/2/2018 12:30 PM Jelani Tristan MD [facility] Interventional Radiology        Today's Diagnoses     Alcoholic cirrhosis of liver with ascites (H)    -  1       Follow-ups after your visit        Your next 10 appointments already scheduled     Jul 02, 2018  1:30 PM CDT   Lab with  LAB   [facility] Lab ([facility])    [address] [place] [id number] [phone]            Jul 02, 2018  2:30 PM CDT   (Arrive by 2:15 PM)   Return General Liver with Gonzalo Miramontes MD   [facility] Hepatology ([facility])    [address] [place] [address]  [address]  [facility] [id number] [phone]            Jul 23, 2018  8:00 AM CDT   Lab with  LAB   [facility] Lab ([facility])    [address] [place] [id number] [phone]            Jul 23, 2018  9:00 AM CDT   (Arrive by 8:45 AM)   Return General Liver with Gonzalo Miramontes MD   [facility] Hepatology ([facility])    [address] [place] [address]  [address]  [facility] [id number] [phone]              Who to contact     Please call your clinic at [phone] to:    Ask questions about your health    Make or cancel appointments    Discuss your medicines    Learn about your test results    Speak to your doctor            Additional Information About Your Visit        [employer] Information     [employer] gives you secure access to your electronic health record. If you see a primary care provider, you can also send messages to your care team and make appointments. If you have questions, please call your primary care clinic.  If you do not have a primary care provider, please call [phone] and they will assist you.       [employer] is an electronic gateway that provides easy, online access to your medical records. With [employer], you can request a clinic appointment, read your test results, renew a prescription or communicate with your care team.     To access your existing account, please contact your [facility] or call [phone] for assistance.        Care EveryWhere ID     This is your Care EveryWhere ID. This could be used by other organizations to access your [place] medical records  [id number]        Your Vitals Were     Pulse Pulse Oximetry                72 100%           Blood Pressure from Last 3 Encounters:   07/02/18 106/59   06/12/18 109/66   06/06/18 125/64    Weight from Last 3 Encounters:   06/09/18 72.6 kg (160 lb)   06/06/18 74.9 kg (165 lb 2 oz)   05/14/18 75 kg (165 lb 5.5 oz)              Today, you had the following     No orders found for display       Primary Care Provider Office Phone # Fax #    Rosette Wills [phone] [address] [place] [facility] [address]        Equal Access to Services     [facility]: Hadii aad ku hadasho Soomaali, waaxda luqadaha, qaybta kaalmada adeegyada, jenny gaitan . So [facility] [phone].    ATENCIÓN: Si habla español, tiene a lee disposición servicios gratuitos de asistencia lingüística. Roberto al [phone].    We comply with applicable federal civil rights laws and [place] laws. We do not discriminate on the basis of race, color, national origin, age, disability, sex, sexual orientation, or gender identity.            Thank you!     Thank you for choosing [facility] INTERVENTIONAL RADIOLOGY  for your care. Our goal is always to provide you with excellent care. Hearing back from our patients is one way we can continue to improve our services. Please take a few minutes to complete the written survey that you may receive in the mail after your visit with us. Thank you!             Your  Updated Medication List - Protect others around you: Learn how to safely use, store and throw away your medicines at www.disposemymeds.org.          This list is accurate as of 7/2/18  1:16 PM.  Always use your most recent med list.                   Brand Name Dispense Instructions for use Diagnosis    carvedilol 6.25 MG tablet    COREG    180 tablet    Take 1 tablet (6.25 mg) by mouth 2 times daily (with meals)    Secondary esophageal varices without bleeding (H), Alcoholic cirrhosis of liver with ascites (H)       furosemide 20 MG tablet    LASIX    120 tablet    Take 3 tablets (60 mg) by mouth daily    Alcoholic cirrhosis of liver with ascites (H)       lactulose 20 GM Packet    CEPHULAC    300 each    Take 1 packet (20 g) by mouth 4 times daily Goal of 3-4 loose BMs per day, extra doses if concerned for confusion or not at goal, may hold for that day if reaches goal    Hepatic encephalopathy (H)       MULTIPLE VITAMINS PO      Take 1 tablet by mouth daily        rifaximin 550 MG Tabs tablet    XIFAXAN    180 tablet    Take 1 tablet (550 mg) by mouth 2 times daily    Hepatic encephalopathy (H)       SILDENAFIL CITRATE PO      Take 10 mg by mouth daily        spironolactone 100 MG tablet    ALDACTONE    120 tablet    Take 1.5 tablets (150 mg) by mouth daily    Alcoholic cirrhosis of liver with ascites (H)       TYLENOL PO      Take by mouth every 4 hours as needed for mild pain or fever

## 2018-07-02 NOTE — MR AVS SNAPSHOT
After Visit Summary   7/2/2018    Ivan Bell    MRN: [id number]           Patient Information     Date Of Birth          1965        Visit Information        Provider Department      7/2/2018 2:30 PM Gonzalo Wahl MD [facility] Hepatology        Today's Diagnoses     Alcoholic cirrhosis of liver with ascites (H)          Care Instructions    Plan  1.  Reduce spironolactone to 1 tab (100mg per day)  2.  Reduce furosemide to 2 tabs (40mg per day)  3.  Stop carvedilol  4.  Continue lactulose and rifaximin  5.  Follow-up with me in 2 months    Gonzalo Bales MD  Hepatology  [facility]          Follow-ups after your visit        Follow-up notes from your care team     Return in about 2 months (around 9/2/2018).      Your next 10 appointments already scheduled     Jul 23, 2018  8:00 AM CDT   Lab with  LAB   [facility] Lab ([facility])    [address]  1st Floor  [facility] [id number] [phone]            Jul 23, 2018  9:00 AM CDT   (Arrive by 8:45 AM)   Return General Liver with Gonzalo Miramontes MD   [facility] Hepatology ([facility])    [address]  [address]  [facility] [id number] [phone]            Sep 10, 2018 10:30 AM CDT   US ABDOMEN/PELVIS DUPLEX COMPLETE with UCUS2   [facility] US ([facility])    [address]  1st Floor  [facility] [id number] [phone]           Please bring a list of your medicines (including vitamins, minerals and over-the-counter drugs). Also, tell your doctor about any allergies you may have. Wear comfortable clothes and leave your valuables at home.  Adults: No eating or drinking for 8 hours before the exam. You may take medicine with a small sip of water.  Children: - Children 6+ years: No food or drink for 6 hours before exam. - Children 1-5 years: No food or drink for 4 hours before exam. - Infants,  breast-fed: may have breast milk up to 2 hours before exam. - Infants, formula: may have bottle until 4 hours before exam.  Please call the Imaging Department at your exam site with any questions.            Sep 10, 2018 11:30 AM CDT   LAB with  LAB   [facility] Lab ([facility])    [address] [place] [id number] [phone]           Please do not eat 10-12 hours before your appointment if you are coming in fasting for labs on lipids, cholesterol, or glucose (sugar). This does not apply to pregnant women. Water, hot tea and black coffee (with nothing added) are okay. Do not drink other fluids, diet soda or chew gum.            Sep 10, 2018 12:00 PM CDT   (Arrive by 11:45 AM)   Return Visit with Jelani Tristan MD   [facility] Interventional Radiology ([facility])    [address] [place] [id number] [phone]              Future tests that were ordered for you today     Open Future Orders        Priority Expected Expires Ordered    US Abdomen Complete w Doppler Complete Routine  7/3/2019 7/3/2018    CBC with platelets Routine  7/3/2019 7/3/2018    Comprehensive metabolic panel Routine  7/3/2019 7/3/2018    INR Routine  7/3/2019 7/3/2018            Who to contact     If you have questions or need follow up information about today's clinic visit or your schedule please contact [facility] HEPATOLOGY directly at [phone].  Normal or non-critical lab and imaging results will be communicated to you by MyChart, letter or phone within 4 business days after the clinic has received the results. If you do not hear from us within 7 days, please contact the clinic through [employer] or phone. If you have a critical or abnormal lab result, we will notify you by phone as soon as possible.  Submit refill requests through [employer] or call your pharmacy and they will forward the refill request to us. Please allow 3 business  days for your refill to be completed.          Additional Information About Your Visit        [employer] Information     [employer] gives you secure access to your electronic health record. If you see a primary care provider, you can also send messages to your care team and make appointments. If you have questions, please call your primary care clinic.  If you do not have a primary care provider, please call [phone] and they will assist you.        Care EveryWhere ID     This is your Care EveryWhere ID. This could be used by other organizations to access your [place] medical records  [id number]        Your Vitals Were     Pulse Temperature Pulse Oximetry BMI (Body Mass Index)          68 98  F (36.7  C) (Oral) 100% 24.34 kg/m2         Blood Pressure from Last 3 Encounters:   07/02/18 106/65   07/02/18 106/59   06/12/18 109/66    Weight from Last 3 Encounters:   07/02/18 76.9 kg (169 lb 9.6 oz)   06/09/18 72.6 kg (160 lb)   06/06/18 74.9 kg (165 lb 2 oz)              Today, you had the following     No orders found for display         Today's Medication Changes          These changes are accurate as of 7/2/18 11:59 PM.  If you have any questions, ask your nurse or doctor.               These medicines have changed or have updated prescriptions.        Dose/Directions    furosemide 20 MG tablet   Commonly known as:  LASIX   This may have changed:  how much to take   Used for:  Alcoholic cirrhosis of liver with ascites (H)   Changed by:  Gonzalo Wahl MD        Dose:  40 mg   Take 2 tablets (40 mg) by mouth daily   Quantity:  120 tablet   Refills:  2       spironolactone 100 MG tablet   Commonly known as:  ALDACTONE   This may have changed:  how much to take   Used for:  Alcoholic cirrhosis of liver with ascites (H)   Changed by:  Gonzalo Wahl MD        Dose:  100 mg   Take 1 tablet (100 mg) by mouth daily   Quantity:  120 tablet   Refills:  2            Where to get your medicines      Some of  these will need a paper prescription and others can be bought over the counter.  Ask your nurse if you have questions.     You don't need a prescription for these medications     furosemide 20 MG tablet    spironolactone 100 MG tablet                Primary Care Provider Office Phone # Fax #    Rosette Wills [phone] [address] [place] [address]        Equal Access to Services     [facility]: Hadii aad ku hadasho Soomaali, waaxda luqadaha, qaybta kaalmada adeegyada, waxay idiin hayaan adeeg kharash la'aan . So [facility] [phone].    ATENCIÓN: Si habla español, tiene a lee disposición servicios gratuitos de asistencia lingüística. [facility] [phone].    We comply with applicable federal civil rights laws and [place] laws. We do not discriminate on the basis of race, color, national origin, age, disability, sex, sexual orientation, or gender identity.            Thank you!     Thank you for choosing [facility] HEPATOLOGY  for your care. Our goal is always to provide you with excellent care. Hearing back from our patients is one way we can continue to improve our services. Please take a few minutes to complete the written survey that you may receive in the mail after your visit with us. Thank you!             Your Updated Medication List - Protect others around you: Learn how to safely use, store and throw away your medicines at www.disposemymeds.org.          This list is accurate as of 7/2/18 11:59 PM.  Always use your most recent med list.                   Brand Name Dispense Instructions for use Diagnosis    furosemide 20 MG tablet    LASIX    120 tablet    Take 2 tablets (40 mg) by mouth daily    Alcoholic cirrhosis of liver with ascites (H)       lactulose 20 GM Packet    CEPHULAC    300 each    Take 1 packet (20 g) by mouth 4 times daily Goal of 3-4 loose BMs per day, extra doses if concerned for confusion or not at goal, may hold for that day if reaches goal     Hepatic encephalopathy (H)       MULTIPLE VITAMINS PO      Take 1 tablet by mouth daily        rifaximin 550 MG Tabs tablet    XIFAXAN    180 tablet    Take 1 tablet (550 mg) by mouth 2 times daily    Hepatic encephalopathy (H)       SILDENAFIL CITRATE PO      Take 10 mg by mouth daily        spironolactone 100 MG tablet    ALDACTONE    120 tablet    Take 1 tablet (100 mg) by mouth daily    Alcoholic cirrhosis of liver with ascites (H)       TYLENOL PO      Take by mouth every 4 hours as needed for mild pain or fever

## 2018-07-03 DIAGNOSIS — K76.6 PORTAL HYPERTENSION (H): ICD-10-CM

## 2018-07-03 DIAGNOSIS — K70.31 ALCOHOLIC CIRRHOSIS OF LIVER WITH ASCITES (H): ICD-10-CM

## 2018-07-03 DIAGNOSIS — I85.00 ESOPHAGEAL VARICES (H): Primary | ICD-10-CM

## 2018-07-10 ENCOUNTER — CARE COORDINATION (OUTPATIENT)
Dept: GASTROENTEROLOGY | Facility: CLINIC | Age: 53
End: 2018-07-10

## 2018-07-10 DIAGNOSIS — K70.31 ALCOHOLIC CIRRHOSIS OF LIVER WITH ASCITES (H): ICD-10-CM

## 2018-07-10 RX ORDER — FUROSEMIDE 20 MG
40 TABLET ORAL DAILY
Qty: 120 TABLET | Refills: 2 | Status: SHIPPED | OUTPATIENT
Start: 2018-07-10 | End: 2018-09-10

## 2018-07-10 NOTE — PROGRESS NOTES
Spoke with pt for check in after decrease in diuretics. Lasix decreased to 40 mg daily and spironolactone decreased to 100 mg daily. Pt denied any issues with edema or fluid retention. Pt has follow-up scheduled with Dr. Bales in September and pt requested an appointment letter be sent in the mail. Letter to be sent by clinic coordinator.

## 2018-07-30 ENCOUNTER — TELEPHONE (OUTPATIENT)
Dept: INTERVENTIONAL RADIOLOGY/VASCULAR | Facility: CLINIC | Age: 53
End: 2018-07-30

## 2018-08-07 DIAGNOSIS — K70.31 ALCOHOLIC CIRRHOSIS OF LIVER WITH ASCITES (H): Primary | ICD-10-CM

## 2018-08-23 ENCOUNTER — OFFICE VISIT (OUTPATIENT)
Dept: FAMILY MEDICINE | Facility: CLINIC | Age: 53
End: 2018-08-23
Payer: COMMERCIAL

## 2018-08-23 ENCOUNTER — RADIANT APPOINTMENT (OUTPATIENT)
Dept: GENERAL RADIOLOGY | Facility: CLINIC | Age: 53
End: 2018-08-23
Attending: FAMILY MEDICINE
Payer: COMMERCIAL

## 2018-08-23 VITALS
DIASTOLIC BLOOD PRESSURE: 73 MMHG | HEIGHT: 70 IN | HEART RATE: 96 BPM | BODY MASS INDEX: 25.9 KG/M2 | TEMPERATURE: 98.1 F | WEIGHT: 180.9 LBS | SYSTOLIC BLOOD PRESSURE: 131 MMHG

## 2018-08-23 DIAGNOSIS — S49.92XA ARM INJURIES, LEFT, INITIAL ENCOUNTER: Primary | ICD-10-CM

## 2018-08-23 DIAGNOSIS — S49.92XA ARM INJURIES, LEFT, INITIAL ENCOUNTER: ICD-10-CM

## 2018-08-23 PROCEDURE — 73090 X-RAY EXAM OF FOREARM: CPT | Mod: LT

## 2018-08-23 PROCEDURE — 99213 OFFICE O/P EST LOW 20 MIN: CPT | Performed by: FAMILY MEDICINE

## 2018-08-23 ASSESSMENT — PAIN SCALES - GENERAL: PAINLEVEL: SEVERE PAIN (7)

## 2018-08-23 NOTE — PROGRESS NOTES
SUBJECTIVE:                                                    Ivna Bell is a 52 year old male who presents to clinic today for the following health issues:    Concern - several cute on left forarm  Onset: Sunday    Description:   Tearing up concrete steps and got a piece of concrete dropped on his arm.    Intensity: moderate    Progression of Symptoms:  same    Accompanying Signs & Symptoms:  He is having some swelling. Still bleeding when he takes the wrap off.    Previous history of similar problem:   never    Therapies Tried and outcome: ty;enol seemed to give some relief    Problem list and histories reviewed & adjusted, as indicated.  Additional history:     Patient Active Problem List   Diagnosis     Hypertriglyceridemia     Gouty arthropathy     Erectile dysfunction     CARDIOVASCULAR SCREENING; LDL GOAL LESS THAN 130     24 hour contact given to patient      Hypertension goal BP (blood pressure) < 140/90     Alcoholic cirrhosis of liver with ascites (H)     Past Surgical History:   Procedure Laterality Date     ANKLE SURGERY Left      COLONOSCOPY N/A 3/31/2016    Procedure: COLONOSCOPY;  Surgeon: Rhys Uriostegui MD;  Location:  GI     ESOPHAGOSCOPY, GASTROSCOPY, DUODENOSCOPY (EGD), COMBINED N/A 3/31/2016    Procedure: COMBINED ESOPHAGOSCOPY, GASTROSCOPY, DUODENOSCOPY (EGD);  Surgeon: Rhys Uriostegui MD;  Location:  GI     ESOPHAGOSCOPY, GASTROSCOPY, DUODENOSCOPY (EGD), COMBINED N/A 3/9/2018    Procedure: COMBINED ESOPHAGOSCOPY, GASTROSCOPY, DUODENOSCOPY (EGD), BIOPSY SINGLE OR MULTIPLE;  EGD;  Surgeon: Gonzalo Wahl MD;  Location:  GI     KNEE SURGERY Left      KNEE SURGERY Right      SIGMOIDOSCOPY FLEXIBLE N/A 10/31/2017    Procedure: SIGMOIDOSCOPY FLEXIBLE;;  Surgeon: Armaan Adams MD;  Location:  GI     TIPS Procedure  06/06/2018       Social History   Substance Use Topics     Smoking status: Former Smoker     Types: Dip, chew, snus or snuff     Quit  "date: 8/29/1998     Smokeless tobacco: Former User     Quit date: 10/24/2017      Comment: 1 tin per 10 days.     Alcohol use No      Comment: last etoh 2/14/16, did have Odouls ~8/2017     Family History   Problem Relation Age of Onset     Family History Negative Mother      Family History Negative Father      Hypertension Father      Cerebrovascular Disease Father 87     Breast Cancer Maternal Grandmother      Rheumatoid Arthritis Daughter      Depression Daughter      Cancer - colorectal No family hx of      Prostate Cancer No family hx of      Liver Disease No family hx of            ROS:  Constitutional, HEENT, cardiovascular, pulmonary, gi and gu systems are negative, except as otherwise noted.    OBJECTIVE:                                                    /73 (BP Location: Right arm, Patient Position: Sitting, Cuff Size: Adult Large)  Pulse 96  Temp 98.1  F (36.7  C) (Tympanic)  Ht 5' 10\" (1.778 m)  Wt 180 lb 14.4 oz (82.1 kg)  BMI 25.96 kg/m2 Body mass index is 25.96 kg/(m^2).   GENERAL: healthy, alert, well nourished, well hydrated, no distress  HENT: ear canals- normal; TMs- normal; Nose- normal; Mouth- no ulcers, no lesions  NECK: no tenderness, no adenopathy, no asymmetry, no masses, no stiffness; thyroid- normal to palpation  RESP: lungs clear to auscultation - no rales, no rhonchi, no wheezes  CV: regular rates and rhythm, normal S1 S2, no S3 or S4 and no murmur, no click or rub -  ABDOMEN: soft, no tenderness, no  hepatosplenomegaly, no masses, normal bowel sounds  forarm  3 3 cortney tears. Puncture type.  No erythema  Debrided  cleanesed with hebicleans soak and irragation      ASSESSMENT/PLAN:                                                      (S49.92XA) Arm injuries, left, initial encounter  (primary encounter diagnosis)  Comment: I independently visualized the xray:  No frx  No forgien body radioopace    Plan: XR Forearm Left 2 Views   wound care instructions given       reports that " he quit smoking about 20 years ago. His smoking use included Dip, chew, snus or snuff. He quit smokeless tobacco use about 10 months ago.        [facility]

## 2018-08-23 NOTE — MR AVS SNAPSHOT
After Visit Summary   8/23/2018    Ivan Bell    MRN: [id number]           Patient Information     Date Of Birth          1965        Visit Information        Provider Department      8/23/2018 11:00 AM Rickie Cisneros MD [facility]        Today's Diagnoses     Arm injuries, left, initial encounter    -  1       Follow-ups after your visit        Your next 10 appointments already scheduled     Sep 10, 2018  8:00 AM CDT   Lab with  LAB   [facility] Lab ([facility])    [address]  1st Floor  [facility] [id number] [phone]            Sep 10, 2018  9:00 AM CDT   (Arrive by 8:45 AM)   Return General Liver with Gonzalo Miramontes MD   [facility] Hepatology ([facility])    [address] [place] [address]  [address]  [facility] [id number] [phone]            Sep 10, 2018 10:30 AM CDT   US ABDOMEN/PELVIS DUPLEX COMPLETE with UCUS2   [facility] [place] US ([facility])    [address]  [facility] [address] [phone]           Please bring a list of your medicines (including vitamins, minerals and over-the-counter drugs). Also, tell your doctor about any allergies you may have. Wear comfortable clothes and leave your valuables at home.  Adults: No eating or drinking for 8 hours before the exam. You may take medicine with a small sip of water.  Children: - Infants, breast-fed: may have breast milk up to 2 hours before exam. - Infants, formula: may have bottle until 4 hours before exam. - Children 1-5 years: No food or drink for 4 hours before exam. - Children 6 -12 years: No food or drink for 6 hours before exam. - Children over 12 years: No food or drink for 8 hours before exam. - J Tube Fed: No need to stop feedings.  Please call the Imaging Department at your exam site with any questions.            Sep 10, 2018 12:00 PM CDT   (Arrive by 11:45 AM)   Return Visit  "with Jelani Tristan MD   [facility] Interventional Radiology ([facility] [place])    [address]  1st Floor  [facility] [address] [phone]              Who to contact     Normal or non-critical lab and imaging results will be communicated to you by MyChart, letter or phone within 4 business days after the clinic has received the results. If you do not hear from us within 7 days, please contact the clinic through MyChart or phone. If you have a critical or abnormal lab result, we will notify you by phone as soon as possible.  Submit refill requests through [employer] or call your pharmacy and they will forward the refill request to us. Please allow 3 business days for your refill to be completed.          If you need to speak with a  for additional information , please call: [phone]             Additional Information About Your Visit        [employer] Information     [employer] gives you secure access to your electronic health record. If you see a primary care provider, you can also send messages to your care team and make appointments. If you have questions, please call your primary care clinic.  If you do not have a primary care provider, please call [phone] and they will assist you.        Care EveryWhere ID     This is your Care EveryWhere ID. This could be used by other organizations to access your [place] medical records  [id number]        Your Vitals Were     Pulse Temperature Height BMI (Body Mass Index)          96 98.1  F (36.7  C) (Tympanic) 5' 10\" (1.778 m) 25.96 kg/m2         Blood Pressure from Last 3 Encounters:   08/23/18 131/73   07/02/18 106/65   07/02/18 106/59    Weight from Last 3 Encounters:   08/23/18 180 lb 14.4 oz (82.1 kg)   07/02/18 169 lb 9.6 oz (76.9 kg)   06/09/18 160 lb (72.6 kg)               Primary Care Provider Office Phone # Fax #    Rosette Wills [phone] [address] [place] " [phone]        Equal Access to Services     [facility]: Hadii linnea stewart jacki Myles, wajadada luqadaha, qaybta kaamarada misha, jenny gaitan . So [facility] [phone].    ATENCIÓN: Si habla español, tiene a lee disposición servicios gratuitos de asistencia lingüística. Osminame al [phone].    We comply with applicable federal civil rights laws and [place] laws. We do not discriminate on the basis of race, color, national origin, age, disability, sex, sexual orientation, or gender identity.            Thank you!     Thank you for choosing [facility]  for your care. Our goal is always to provide you with excellent care. Hearing back from our patients is one way we can continue to improve our services. Please take a few minutes to complete the written survey that you may receive in the mail after your visit with us. Thank you!             Your Updated Medication List - Protect others around you: Learn how to safely use, store and throw away your medicines at www.disposemymeds.org.          This list is accurate as of 8/23/18 11:59 PM.  Always use your most recent med list.                   Brand Name Dispense Instructions for use Diagnosis    furosemide 20 MG tablet    LASIX    120 tablet    Take 2 tablets (40 mg) by mouth daily    Alcoholic cirrhosis of liver with ascites (H)       lactulose 20 GM Packet    CEPHULAC    300 each    Take 1 packet (20 g) by mouth 4 times daily Goal of 3-4 loose BMs per day, extra doses if concerned for confusion or not at goal, may hold for that day if reaches goal    Hepatic encephalopathy (H)       MULTIPLE VITAMINS PO      Take 1 tablet by mouth daily        rifaximin 550 MG Tabs tablet    XIFAXAN    180 tablet    Take 1 tablet (550 mg) by mouth 2 times daily    Hepatic encephalopathy (H)       SILDENAFIL CITRATE PO      Take 10 mg by mouth daily        spironolactone 100 MG tablet    ALDACTONE    120 tablet    Take 1 tablet (100 mg) by  mouth daily    Alcoholic cirrhosis of liver with ascites (H)       TYLENOL PO      Take by mouth every 4 hours as needed for mild pain or fever

## 2018-09-10 ENCOUNTER — RADIANT APPOINTMENT (OUTPATIENT)
Dept: ULTRASOUND IMAGING | Facility: CLINIC | Age: 53
End: 2018-09-10
Attending: RADIOLOGY
Payer: COMMERCIAL

## 2018-09-10 ENCOUNTER — OFFICE VISIT (OUTPATIENT)
Dept: INTERVENTIONAL RADIOLOGY/VASCULAR | Facility: CLINIC | Age: 53
End: 2018-09-10
Payer: COMMERCIAL

## 2018-09-10 ENCOUNTER — OFFICE VISIT (OUTPATIENT)
Dept: GASTROENTEROLOGY | Facility: CLINIC | Age: 53
End: 2018-09-10
Attending: INTERNAL MEDICINE
Payer: COMMERCIAL

## 2018-09-10 VITALS
TEMPERATURE: 98 F | HEIGHT: 70 IN | SYSTOLIC BLOOD PRESSURE: 135 MMHG | DIASTOLIC BLOOD PRESSURE: 74 MMHG | BODY MASS INDEX: 25.8 KG/M2 | RESPIRATION RATE: 16 BRPM | HEART RATE: 80 BPM | WEIGHT: 180.2 LBS | OXYGEN SATURATION: 100 %

## 2018-09-10 DIAGNOSIS — K76.6 PORTAL HYPERTENSION (H): ICD-10-CM

## 2018-09-10 DIAGNOSIS — I85.00 ESOPHAGEAL VARICES (H): ICD-10-CM

## 2018-09-10 DIAGNOSIS — K70.31 ALCOHOLIC CIRRHOSIS OF LIVER WITH ASCITES (H): Primary | ICD-10-CM

## 2018-09-10 DIAGNOSIS — K70.31 ALCOHOLIC CIRRHOSIS OF LIVER WITH ASCITES (H): ICD-10-CM

## 2018-09-10 LAB
ALBUMIN SERPL-MCNC: 2.8 G/DL (ref 3.4–5)
ALP SERPL-CCNC: 110 U/L (ref 40–150)
ALT SERPL W P-5'-P-CCNC: 16 U/L (ref 0–70)
ANION GAP SERPL CALCULATED.3IONS-SCNC: 10 MMOL/L (ref 3–14)
AST SERPL W P-5'-P-CCNC: 34 U/L (ref 0–45)
BILIRUB DIRECT SERPL-MCNC: 0.9 MG/DL (ref 0–0.2)
BILIRUB SERPL-MCNC: 2.6 MG/DL (ref 0.2–1.3)
BUN SERPL-MCNC: 13 MG/DL (ref 7–30)
CALCIUM SERPL-MCNC: 8.7 MG/DL (ref 8.5–10.1)
CHLORIDE SERPL-SCNC: 111 MMOL/L (ref 94–109)
CO2 SERPL-SCNC: 24 MMOL/L (ref 20–32)
CREAT SERPL-MCNC: 1.26 MG/DL (ref 0.66–1.25)
ERYTHROCYTE [DISTWIDTH] IN BLOOD BY AUTOMATED COUNT: 16.8 % (ref 10–15)
GFR SERPL CREATININE-BSD FRML MDRD: 59 ML/MIN/1.7M2
GLUCOSE SERPL-MCNC: 100 MG/DL (ref 70–99)
HCT VFR BLD AUTO: 32.3 % (ref 40–53)
HGB BLD-MCNC: 11.3 G/DL (ref 13.3–17.7)
INR PPP: 1.47 (ref 0.86–1.14)
MCH RBC QN AUTO: 31.9 PG (ref 26.5–33)
MCHC RBC AUTO-ENTMCNC: 35 G/DL (ref 31.5–36.5)
MCV RBC AUTO: 91 FL (ref 78–100)
PLATELET # BLD AUTO: 60 10E9/L (ref 150–450)
POTASSIUM SERPL-SCNC: 2.9 MMOL/L (ref 3.4–5.3)
PROT SERPL-MCNC: 6.1 G/DL (ref 6.8–8.8)
RBC # BLD AUTO: 3.54 10E12/L (ref 4.4–5.9)
SODIUM SERPL-SCNC: 144 MMOL/L (ref 133–144)
WBC # BLD AUTO: 3.1 10E9/L (ref 4–11)

## 2018-09-10 PROCEDURE — 82248 BILIRUBIN DIRECT: CPT | Performed by: RADIOLOGY

## 2018-09-10 PROCEDURE — G0463 HOSPITAL OUTPT CLINIC VISIT: HCPCS

## 2018-09-10 PROCEDURE — 36415 COLL VENOUS BLD VENIPUNCTURE: CPT | Performed by: RADIOLOGY

## 2018-09-10 ASSESSMENT — PAIN SCALES - GENERAL: PAINLEVEL: NO PAIN (0)

## 2018-09-10 NOTE — MR AVS SNAPSHOT
After Visit Summary   9/10/2018    Ivan Bell    MRN: [id number]           Patient Information     Date Of Birth          1965        Visit Information        Provider Department      9/10/2018 9:00 AM Gonzalo Wahl MD [facility] Hepatology         Follow-ups after your visit        Follow-up notes from your care team     Return in about 6 months (around 3/10/2019).      Your next 10 appointments already scheduled     Sep 10, 2018 10:30 AM CDT   US ABDOMEN/PELVIS DUPLEX COMPLETE with UCUS2   [facility] US ([facility])    [address] [place] [address] [phone]           How do I prepare for my exam? (Food and drink instructions) Adults: No eating, smoking, gum chewing or drinking for 8 hours before the exam. You may take medicine with a small sip of water.  Children: * Infants, breast-fed: may have breast milk up to 2 hours before exam. * Infants, formula: may have bottle until 4 hours before exam. * Children 1-5 years: No food or drink for 4 hours before exam. * Children 6 -12 years: No food or drink for 6 hours before exam. * Children over 12 years: No food or drink for 8 hours before exam.  * J Tube Fed: No need to stop feedings.  What should I wear: Wear comfortable clothes.  How long does the exam take: Most ultrasounds take 30 to 60 minutes.  What should I bring: Bring a list of your medicines, including vitamins, minerals and over-the-counter drugs. It is safest to leave personal items at home.  Do I need a :  No  is needed.  What do I need to tell my doctor: Tell your doctor about any allergies you may have.  What should I do after the exam: No restrictions, You may resume normal activities.  What is this test: An ultrasound uses sound waves to make pictures of the body. Sound waves do not cause pain. The only discomfort may be the pressure of the wand against your skin or full bladder.  Who  should I call with questions: If you have any questions, please call the Imaging Department where you will have your exam. Directions, parking instructions, and other information is available on our website, [email or web address].            Sep 10, 2018 12:00 PM CDT   (Arrive by 11:45 AM)   Return Visit with Jelani Tristan MD   [facility] Interventional Radiology ([facility])    [address]  1st Floor  [facility] [id number] [phone]            Mar 11, 2019  7:30 AM CDT   Lab with  LAB   [facility] Lab ([facility])    [address]  1st Floor  [facility] [id number] [phone]            Mar 11, 2019  8:30 AM CDT   (Arrive by 8:15 AM)   Return General Liver with Gonzalo Miramontes MD   [facility] Hepatology ([facility])    [address]  [address]  [facility] [address] [phone]              Who to contact     If you have questions or need follow up information about today's clinic visit or your schedule please contact [facility] HEPATOLOGY directly at [phone].  Normal or non-critical lab and imaging results will be communicated to you by MyChart, letter or phone within 4 business days after the clinic has received the results. If you do not hear from us within 7 days, please contact the clinic through MyChart or phone. If you have a critical or abnormal lab result, we will notify you by phone as soon as possible.  Submit refill requests through [employer] or call your pharmacy and they will forward the refill request to us. Please allow 3 business days for your refill to be completed.          Additional Information About Your Visit        [employer] Information     [employer] gives you secure access to your electronic health record. If you see a primary care provider, you can also send messages to your care team and make appointments. If you have questions, please call your primary care  "clinic.  If you do not have a primary care provider, please call [phone] and they will assist you.        Care EveryWhere ID     This is your Care EveryWhere ID. This could be used by other organizations to access your [place] medical records  [id number]        Your Vitals Were     Pulse Temperature Respirations Height Pulse Oximetry BMI (Body Mass Index)    80 98  F (36.7  C) (Oral) 16 1.778 m (5' 10\") 100% 25.86 kg/m2       Blood Pressure from Last 3 Encounters:   09/10/18 135/74   08/23/18 131/73   07/02/18 106/65    Weight from Last 3 Encounters:   09/10/18 81.7 kg (180 lb 3.2 oz)   08/23/18 82.1 kg (180 lb 14.4 oz)   07/02/18 76.9 kg (169 lb 9.6 oz)              Today, you had the following     No orders found for display       Primary Care Provider Office Phone # Fax #    Rosette Wills [phone] [address] [place] [address]        Equal Access to Services     [facility]: Hadii linnea ku hadjeremy Sobud, waaxda luqadaha, qaybta kaalmashaji cabral, jenny gaitan . So [facility] [phone].    ATENCIÓN: Si habla español, tiene a lee disposición servicios gratuitos de asistencia lingüística. [facility] [phone].    We comply with applicable federal civil rights laws and [place] laws. We do not discriminate on the basis of race, color, national origin, age, disability, sex, sexual orientation, or gender identity.            Thank you!     Thank you for choosing [facility] HEPATOLOGY  for your care. Our goal is always to provide you with excellent care. Hearing back from our patients is one way we can continue to improve our services. Please take a few minutes to complete the written survey that you may receive in the mail after your visit with us. Thank you!             Your Updated Medication List - Protect others around you: Learn how to safely use, store and throw away your medicines at www.disposemymeds.org.          This list is accurate as " of 9/10/18  9:31 AM.  Always use your most recent med list.                   Brand Name Dispense Instructions for use Diagnosis    furosemide 20 MG tablet    LASIX    120 tablet    Take 2 tablets (40 mg) by mouth daily    Alcoholic cirrhosis of liver with ascites (H)       lactulose 20 GM Packet    CEPHULAC    300 each    Take 1 packet (20 g) by mouth 4 times daily Goal of 3-4 loose BMs per day, extra doses if concerned for confusion or not at goal, may hold for that day if reaches goal    Hepatic encephalopathy (H)       MULTIPLE VITAMINS PO      Take 1 tablet by mouth daily        rifaximin 550 MG Tabs tablet    XIFAXAN    180 tablet    Take 1 tablet (550 mg) by mouth 2 times daily    Hepatic encephalopathy (H)       spironolactone 100 MG tablet    ALDACTONE    120 tablet    Take 1 tablet (100 mg) by mouth daily    Alcoholic cirrhosis of liver with ascites (H)       TYLENOL PO      Take by mouth every 4 hours as needed for mild pain or fever

## 2018-09-10 NOTE — MR AVS SNAPSHOT
MRN:[id number]                      After Visit Summary   9/10/2018    Ivan Bell    MRN: [id number]           Visit Information        Provider Department      9/10/2018 12:00 PM Jelani Tristan MD [facility] Interventional Radiology        Your next 10 appointments already scheduled     Mar 11, 2019  7:30 AM CDT   Lab with UC LAB   [facility] Lab ([facility])    [address]  1st Floor  [facility] [address] [phone]            Mar 11, 2019  8:30 AM CDT   (Arrive by 8:15 AM)   Return General Liver with Gonzalo Miramontes MD   [facility] Hepatology ([facility])    [address]  [address]  [facility] [address] [phone]              MyChart Information     [employer] gives you secure access to your electronic health record. If you see a primary care provider, you can also send messages to your care team and make appointments. If you have questions, please call your primary care clinic.  If you do not have a primary care provider, please call [phone] and they will assist you.      [employer] is an electronic gateway that provides easy, online access to your medical records. With [employer], you can request a clinic appointment, read your test results, renew a prescription or communicate with your care team.     To access your existing account, please contact your [facility] or call [phone] for assistance.        Care EveryWhere ID     This is your Care EveryWhere ID. This could be used by other organizations to access your [place] medical records  [id number]        Equal Access to Services     [facility]: Hadii aad ku hadasho Soomaali, waaxda luqadaha, qaybta kaalmada adeegyada, waxay jenellein hayberenicen han molina. So [facility] [phone].    ATENCIÓN: Si habla español, tiene a lee disposición servicios gratuitos de asistencia lingüística. Llame al [phone].    We  comply with applicable federal civil rights laws and [place] laws. We do not discriminate on the basis of race, color, national origin, age, disability, sex, sexual orientation, or gender identity.

## 2018-09-10 NOTE — LETTER
9/10/2018       RE: Ivan Bell  [address] [address]     Dear Colleague,    Thank you for referring your patient, Ivan Bell, to the [facility] INTERVENTIONAL RADIOLOGY at [facility]. Please see a copy of my visit note below.    S: Mr. 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  He was treated as he was failing variceal banding therapy.   He has had no further episodes of HE, he does continue to take lactulose and has 2-3 bowel movements a day.  He denies jaundice, ascites, or lower extremity edema.     O:  LE: No edema.    Lab Results   Component Value Date    WBC 3.1 09/10/2018     Lab Results   Component Value Date    RBC 3.54 09/10/2018     Lab Results   Component Value Date    HGB 11.3 09/10/2018     Lab Results   Component Value Date    HCT 32.3 09/10/2018     Lab Results   Component Value Date    MCV 91 09/10/2018     Lab Results   Component Value Date    MCH 31.9 09/10/2018     Lab Results   Component Value Date    MCHC 35.0 09/10/2018     Lab Results   Component Value Date    RDW 16.8 09/10/2018     Lab Results   Component Value Date    PLT 60 09/10/2018         Last Comprehensive Metabolic Panel:  Sodium   Date Value Ref Range Status   09/10/2018 144 133 - 144 mmol/L Final     Potassium   Date Value Ref Range Status   09/10/2018 2.9 (L) 3.4 - 5.3 mmol/L Final     Chloride   Date Value Ref Range Status   09/10/2018 111 (H) 94 - 109 mmol/L Final     Carbon Dioxide   Date Value Ref Range Status   09/10/2018 24 20 - 32 mmol/L Final     Anion Gap   Date Value Ref Range Status   09/10/2018 10 3 - 14 mmol/L Final     Glucose   Date Value Ref Range Status   09/10/2018 100 (H) 70 - 99 mg/dL Final     Urea Nitrogen   Date Value Ref Range Status   09/10/2018 13 7 - 30 mg/dL Final     Creatinine   Date Value Ref Range Status   09/10/2018 1.26 (H) 0.66 - 1.25 mg/dL Final      GFR Estimate   Date Value Ref Range Status   09/10/2018 59 (L) >60 mL/min/1.7m2 Final     Comment:     Non  GFR Calc     Calcium   Date Value Ref Range Status   09/10/2018 8.7 8.5 - 10.1 mg/dL Final     Bilirubin Total   Date Value Ref Range Status   09/10/2018 2.6 (H) 0.2 - 1.3 mg/dL Final     Alkaline Phosphatase   Date Value Ref Range Status   09/10/2018 110 40 - 150 U/L Final     ALT   Date Value Ref Range Status   09/10/2018 16 0 - 70 U/L Final     AST   Date Value Ref Range Status   09/10/2018 34 0 - 45 U/L Final     US: I reviewed the US from 9/10/2018 and it demonstrated a open TIPS with acceptable velocities.     A/P: Mr. Bell is doing well approximately 3 months s/p TIPS placement.  He has no complaints.  We discussed that he does not need to have a scheduled follow up with me.  He will continue to see Dr. Bales and we will follow his US results.     A total of 15 minutes was spent in care for the patient, of which >50% was spent in counseling and co-ordination of care.        Again, thank you for allowing me to participate in the care of your patient.      Sincerely,    Jelani Tristan MD

## 2018-09-10 NOTE — PROGRESS NOTES
[facility]    Hepatology follow-up    Follow-up visit for cirrhosis    Subjective:  53 year old male    Cirrhosis  - ETOH  - hx ascites, TIPS placement 5/14/18, 6/6/18  - hx HE  - hx variceal bleed Oct 2017  - last EGD Apr 2018- medium EV with bandingx4, mild portal hypertensive gastropathy  - HCC screening- liver doppler U/S 9/10/2018 (P)    The patient comes to clinic this morning with his wife for follow-up of cirrhosis.  Last clinic visit was 07/2018.  The patient denies new medications, ER visits or hospital admissions since last clinic visit.      The patient is doing well today.  He reports resolution of bloating symptoms compared to last visit.  He denies any abdominal discomfort or abdominal distention.  He denies lower extremity edema.  He continues to take his diuretics as prescribed.    He is taking lactulose once per day with 2-3 bowel movements per day.  He also continues to take rifaximin for now.     The patient denies jaundice, lethargy or confusion.      The patient denies melena, hematemesis, hematochezia.      The patient denies fevers, sweats or chills.      Weight has been stable.      The patient has not drank alcohol since 10/2017.  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       Medical hx Surgical hx   Past Medical History:   Diagnosis Date     Ascites      Cirrhosis (H)      Esophageal varices (H)      Hepatitis      Hypertension      Left calcaneus fracture 1/9/2006     Portal vein thrombosis       Past Surgical History:   Procedure Laterality Date     ANKLE SURGERY Left      COLONOSCOPY N/A 3/31/2016    Procedure: COLONOSCOPY;  Surgeon: Rhys Uriostegui MD;  Location:  GI     ESOPHAGOSCOPY, GASTROSCOPY, DUODENOSCOPY (EGD), COMBINED N/A 3/31/2016    Procedure: COMBINED ESOPHAGOSCOPY, GASTROSCOPY, DUODENOSCOPY (EGD);  Surgeon: Rhys Uriostegui MD;  Location:  GI     ESOPHAGOSCOPY, GASTROSCOPY, DUODENOSCOPY (EGD), COMBINED  "N/A 3/9/2018    Procedure: COMBINED ESOPHAGOSCOPY, GASTROSCOPY, DUODENOSCOPY (EGD), BIOPSY SINGLE OR MULTIPLE;  EGD;  Surgeon: Gonzalo Wahl MD;  Location:  GI     KNEE SURGERY Left      KNEE SURGERY Right      SIGMOIDOSCOPY FLEXIBLE N/A 10/31/2017    Procedure: SIGMOIDOSCOPY FLEXIBLE;;  Surgeon: Armaan Adams MD;  Location:  GI     TIPS Procedure  06/06/2018          Medications  Prior to Admission medications    Medication Sig Start Date End Date Taking? Authorizing Provider   Acetaminophen (TYLENOL PO) Take by mouth every 4 hours as needed for mild pain or fever   Yes Reported, Patient   furosemide (LASIX) 20 MG tablet Take 2 tablets (40 mg) by mouth daily 7/10/18  Yes Gonzalo Wahl MD   lactulose (CEPHULAC) 20 GM Packet Take 1 packet (20 g) by mouth 4 times daily Goal of 3-4 loose BMs per day, extra doses if concerned for confusion or not at goal, may hold for that day if reaches goal 6/12/18  Yes Antwon Light MD   MULTIPLE VITAMINS PO Take 1 tablet by mouth daily   Yes Reported, Patient   rifaximin (XIFAXAN) 550 MG TABS tablet Take 1 tablet (550 mg) by mouth 2 times daily 6/12/18  Yes Antwon Light MD   spironolactone (ALDACTONE) 100 MG tablet Take 1 tablet (100 mg) by mouth daily 7/2/18  Yes Gonzalo Wahl MD       Allergies  Allergies   Allergen Reactions     Benadryl [Diphenhydramine] Other (See Comments)     Delirium (visual and auditory hallucinations)     Oxycodone Other (See Comments)     Delirium and constipation       Review of systems  A 10-point review of systems was negative    Examination  /74 (BP Location: Right arm, Patient Position: Sitting, Cuff Size: Adult Regular)  Pulse 80  Temp 98  F (36.7  C) (Oral)  Resp 16  Ht 1.778 m (5' 10\")  Wt 81.7 kg (180 lb 3.2 oz)  SpO2 100%  BMI 25.86 kg/m2  Body mass index is 25.86 kg/(m^2).    Gen- well, NAD, A+Ox3, normal color  CVS- RRR  RS- CTA  Abd- soft, non-tender, no ascites or organomegaly on " palpation or percussion, BS+  Extr- hands normal, no BESSY  Skin- no rash or jaundice  Neuro- no asterixis  Psych- normal mood    Laboratory  Lab Results   Component Value Date     09/10/2018    POTASSIUM 2.9 09/10/2018    CHLORIDE 111 09/10/2018    CO2 24 09/10/2018    BUN 13 09/10/2018    CR 1.26 09/10/2018       Lab Results   Component Value Date    BILITOTAL 2.6 09/10/2018    ALT 16 09/10/2018    AST 34 09/10/2018    ALKPHOS 110 09/10/2018       Lab Results   Component Value Date    ALBUMIN 2.8 09/10/2018    PROTTOTAL 6.1 09/10/2018        Lab Results   Component Value Date    WBC 3.1 09/10/2018    HGB 11.3 09/10/2018    MCV 91 09/10/2018    PLT 60 09/10/2018       Lab Results   Component Value Date    INR 1.47 09/10/2018       Radiology  Liver doppler U/S 9/10/2018 (P)    Assessment  53-year-old male who presents for routine follow-up of decompensated alcoholic cirrhosis complicated with diuretic refractory ascites (s/p TIPS), variceal hemorrhage and hepatic encephalopathy.  MELD-Na= 17.  Last ETOH = 10/2017.  No evidence of ascites.  Hepatic encephalopathy adequately managed on current medical regimen.  If no evidence of ascites on abdominal ultrasound today, will discontinue diuretics especially with mild renal dysfunction.  If evidence of small ascites, will reduce dose of furosemide and discontinue spironolactone due to side effects of gynecomastia.  The patient has a prescription for rifaximin that is due to finish soon:  will try lactulose alone for management of hepatic encephalopathy.  Up-to-date with HCC screening.     Plan  1.  Abstinence from alcohol  2.  Low Na diet  3.  Follow-up pending liver ultrasound today  4.  Repeat CMP, INR, CBC next week  5.  Continue lactulose  6.  Trial off rifaximin once prescription completed  7.  Follow-up with me in 6 months    Gonzalo Bales MD  Hepatology  [facility]    Addendum 9/11/18 0820hrs- no evidence of ascites on U/S.  Will  discontinue diuretics.  Will repeat blood work next week to evaluate for improvement in lytes and renal function.  Discussed with patient.

## 2018-09-10 NOTE — NURSING NOTE
"Chief Complaint   Patient presents with     RECHECK     alcohol induced cirrhosis       /74 (BP Location: Right arm, Patient Position: Sitting, Cuff Size: Adult Regular)  Pulse 80  Temp 98  F (36.7  C) (Oral)  Resp 16  Ht 1.778 m (5' 10\")  Wt 81.7 kg (180 lb 3.2 oz)  SpO2 100%  BMI 25.86 kg/m2      Lida Hutchinson [facility]  9/10/2018 8:54 AM      "

## 2018-09-10 NOTE — PROGRESS NOTES
S: Mr. 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  He was treated as he was failing variceal banding therapy.  He has had no further episodes of HE, he does continue to take lactulose and has 2-3 bowel movements a day.  He denies jaundice, ascites, or lower extremity edema.     O:  LE: No edema.    Lab Results   Component Value Date    WBC 3.1 09/10/2018     Lab Results   Component Value Date    RBC 3.54 09/10/2018     Lab Results   Component Value Date    HGB 11.3 09/10/2018     Lab Results   Component Value Date    HCT 32.3 09/10/2018     Lab Results   Component Value Date    MCV 91 09/10/2018     Lab Results   Component Value Date    MCH 31.9 09/10/2018     Lab Results   Component Value Date    MCHC 35.0 09/10/2018     Lab Results   Component Value Date    RDW 16.8 09/10/2018     Lab Results   Component Value Date    PLT 60 09/10/2018         Last Comprehensive Metabolic Panel:  Sodium   Date Value Ref Range Status   09/10/2018 144 133 - 144 mmol/L Final     Potassium   Date Value Ref Range Status   09/10/2018 2.9 (L) 3.4 - 5.3 mmol/L Final     Chloride   Date Value Ref Range Status   09/10/2018 111 (H) 94 - 109 mmol/L Final     Carbon Dioxide   Date Value Ref Range Status   09/10/2018 24 20 - 32 mmol/L Final     Anion Gap   Date Value Ref Range Status   09/10/2018 10 3 - 14 mmol/L Final     Glucose   Date Value Ref Range Status   09/10/2018 100 (H) 70 - 99 mg/dL Final     Urea Nitrogen   Date Value Ref Range Status   09/10/2018 13 7 - 30 mg/dL Final     Creatinine   Date Value Ref Range Status   09/10/2018 1.26 (H) 0.66 - 1.25 mg/dL Final     GFR Estimate   Date Value Ref Range Status   09/10/2018 59 (L) >60 mL/min/1.7m2 Final     Comment:     Non  GFR Calc     Calcium   Date Value Ref Range Status   09/10/2018 8.7 8.5 - 10.1 mg/dL Final     Bilirubin Total   Date Value Ref Range Status   09/10/2018 2.6 (H) 0.2 -  1.3 mg/dL Final     Alkaline Phosphatase   Date Value Ref Range Status   09/10/2018 110 40 - 150 U/L Final     ALT   Date Value Ref Range Status   09/10/2018 16 0 - 70 U/L Final     AST   Date Value Ref Range Status   09/10/2018 34 0 - 45 U/L Final     US: I reviewed the US from 9/10/2018 and it demonstrated a open TIPS with acceptable velocities.     A/P: Mr. Bell is doing well approximately 3 months s/p TIPS placement.  He has no complaints.  We discussed that he does not need to have a scheduled follow up with me.  He will continue to see Dr. Bales and we will follow his US results.     A total of 15 minutes was spent in care for the patient, of which >50% was spent in counseling and co-ordination of care.

## 2018-09-10 NOTE — LETTER
9/10/2018       RE: Ivan Bell  [id number] [address] [place] [id number]     Dear Colleague,    Thank you for referring your patient, Ivan Bell, to the [facility] HEPATOLOGY at [facility]. Please see a copy of my visit note below.    [facility]    Hepatology follow-up    Follow-up visit for cirrhosis    Subjective:  53 year old male    Cirrhosis  - ETOH  - hx ascites, TIPS placement 5/14/18, 6/6/18  - hx HE  - hx variceal bleed Oct 2017  - last EGD Apr 2018- medium EV with bandingx4, mild portal hypertensive gastropathy  - HCC screening- liver doppler U/S 9/10/2018 (P)    The patient comes to clinic this morning with his wife for follow-up of cirrhosis.  Last clinic visit was 07/2018.  The patient denies new medications, ER visits or hospital admissions since last clinic visit.      The patient is doing well today.  He reports resolution of bloating symptoms compared to last visit.  He denies any abdominal discomfort or abdominal distention.  He denies lower extremity edema.  He continues to take his diuretics as prescribed.    He is taking lactulose once per day with 2-3 bowel movements per day.  He also continues to take rifaximin for now.     The patient denies jaundice, lethargy or confusion.      The patient denies melena, hematemesis, hematochezia.      The patient denies fevers, sweats or chills.      Weight has been stable.      The patient has not drank alcohol since 10/2017.  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       Medical hx Surgical hx   Past Medical History:   Diagnosis Date     Ascites      Cirrhosis (H)      Esophageal varices (H)      Hepatitis      Hypertension      Left calcaneus fracture 1/9/2006     Portal vein thrombosis       Past Surgical History:   Procedure Laterality Date     ANKLE SURGERY Left      COLONOSCOPY N/A 3/31/2016    Procedure: COLONOSCOPY;  Surgeon: Rhys Uriostegui MD;   Location:  GI     ESOPHAGOSCOPY, GASTROSCOPY, DUODENOSCOPY (EGD), COMBINED N/A 3/31/2016    Procedure: COMBINED ESOPHAGOSCOPY, GASTROSCOPY, DUODENOSCOPY (EGD);  Surgeon: Rhys Uriostegui MD;  Location:  GI     ESOPHAGOSCOPY, GASTROSCOPY, DUODENOSCOPY (EGD), COMBINED N/A 3/9/2018    Procedure: COMBINED ESOPHAGOSCOPY, GASTROSCOPY, DUODENOSCOPY (EGD), BIOPSY SINGLE OR MULTIPLE;  EGD;  Surgeon: Goznalo Wahl MD;  Location:  GI     KNEE SURGERY Left      KNEE SURGERY Right      SIGMOIDOSCOPY FLEXIBLE N/A 10/31/2017    Procedure: SIGMOIDOSCOPY FLEXIBLE;;  Surgeon: Armaan Adams MD;  Location:  GI     TIPS Procedure  06/06/2018          Medications  Prior to Admission medications    Medication Sig Start Date End Date Taking? Authorizing Provider   Acetaminophen (TYLENOL PO) Take by mouth every 4 hours as needed for mild pain or fever   Yes Reported, Patient   furosemide (LASIX) 20 MG tablet Take 2 tablets (40 mg) by mouth daily 7/10/18  Yes Gonzalo Wahl MD   lactulose (CEPHULAC) 20 GM Packet Take 1 packet (20 g) by mouth 4 times daily Goal of 3-4 loose BMs per day, extra doses if concerned for confusion or not at goal, may hold for that day if reaches goal 6/12/18  Yes Antwon Light MD   MULTIPLE VITAMINS PO Take 1 tablet by mouth daily   Yes Reported, Patient   rifaximin (XIFAXAN) 550 MG TABS tablet Take 1 tablet (550 mg) by mouth 2 times daily 6/12/18  Yes Antwon Light MD   spironolactone (ALDACTONE) 100 MG tablet Take 1 tablet (100 mg) by mouth daily 7/2/18  Yes Gonzalo Wahl MD       Allergies  Allergies   Allergen Reactions     Benadryl [Diphenhydramine] Other (See Comments)     Delirium (visual and auditory hallucinations)     Oxycodone Other (See Comments)     Delirium and constipation       Review of systems  A 10-point review of systems was negative    Examination  /74 (BP Location: Right arm, Patient Position: Sitting, Cuff Size: Adult Regular)   "Pulse 80  Temp 98  F (36.7  C) (Oral)  Resp 16  Ht 1.778 m (5' 10\")  Wt 81.7 kg (180 lb 3.2 oz)  SpO2 100%  BMI 25.86 kg/m2  Body mass index is 25.86 kg/(m^2).    Gen- well, NAD, A+Ox3, normal color  CVS- RRR  RS- CTA  Abd- soft, non-tender, no ascites or organomegaly on palpation or percussion, BS+  Extr- hands normal, no BESSY  Skin- no rash or jaundice  Neuro- no asterixis  Psych- normal mood    Laboratory  Lab Results   Component Value Date     09/10/2018    POTASSIUM 2.9 09/10/2018    CHLORIDE 111 09/10/2018    CO2 24 09/10/2018    BUN 13 09/10/2018    CR 1.26 09/10/2018       Lab Results   Component Value Date    BILITOTAL 2.6 09/10/2018    ALT 16 09/10/2018    AST 34 09/10/2018    ALKPHOS 110 09/10/2018       Lab Results   Component Value Date    ALBUMIN 2.8 09/10/2018    PROTTOTAL 6.1 09/10/2018        Lab Results   Component Value Date    WBC 3.1 09/10/2018    HGB 11.3 09/10/2018    MCV 91 09/10/2018    PLT 60 09/10/2018       Lab Results   Component Value Date    INR 1.47 09/10/2018       Radiology  Liver doppler U/S 9/10/2018 (P)    Assessment  53-year-old male who presents for routine follow-up of decompensated alcoholic cirrhosis complicated with diuretic refractory ascites (s/p TIPS), variceal hemorrhage and hepatic encephalopathy.  MELD-Na= 17.  Last ETOH = 10/2017.  No evidence of ascites.  Hepatic encephalopathy adequately managed on current medical regimen.  If no evidence of ascites on abdominal ultrasound today, will discontinue diuretics especially with mild renal dysfunction.  If evidence of small ascites, will reduce dose of furosemide and discontinue spironolactone due to side effects of gynecomastia.  The patient has a prescription for rifaximin that is due to finish soon:  will try lactulose alone for management of hepatic encephalopathy.  Up-to-date with HCC screening.     Plan  1.  Abstinence from alcohol  2.  Low Na diet  3.  Follow-up pending liver ultrasound today  4.  Repeat " CMP, INR, CBC next week  5.  Continue lactulose  6.  Trial off rifaximin once prescription completed  7.  Follow-up with me in 6 months    Gonzalo Bales MD  Hepatology  [facility]    Addendum 9/11/18 0820hrs- no evidence of ascites on U/S.  Will discontinue diuretics.  Will repeat blood work next week to evaluate for improvement in lytes and renal function.  Discussed with patient.

## 2018-09-21 ENCOUNTER — TELEPHONE (OUTPATIENT)
Dept: TRANSPLANT | Facility: CLINIC | Age: 53
End: 2018-09-21

## 2018-09-21 ENCOUNTER — OFFICE VISIT (OUTPATIENT)
Dept: FAMILY MEDICINE | Facility: CLINIC | Age: 53
End: 2018-09-21
Payer: COMMERCIAL

## 2018-09-21 ENCOUNTER — RADIANT APPOINTMENT (OUTPATIENT)
Dept: GENERAL RADIOLOGY | Facility: CLINIC | Age: 53
End: 2018-09-21
Attending: FAMILY MEDICINE
Payer: COMMERCIAL

## 2018-09-21 VITALS
HEIGHT: 70 IN | SYSTOLIC BLOOD PRESSURE: 122 MMHG | DIASTOLIC BLOOD PRESSURE: 60 MMHG | WEIGHT: 190.38 LBS | BODY MASS INDEX: 27.25 KG/M2 | HEART RATE: 70 BPM

## 2018-09-21 DIAGNOSIS — N20.0 NEPHROLITHIASIS: ICD-10-CM

## 2018-09-21 DIAGNOSIS — K70.31 ALCOHOLIC CIRRHOSIS OF LIVER WITH ASCITES (H): ICD-10-CM

## 2018-09-21 DIAGNOSIS — M25.541 METACARPOPHALANGEAL JOINT PAIN OF RIGHT HAND: ICD-10-CM

## 2018-09-21 DIAGNOSIS — Z23 NEED FOR PROPHYLACTIC VACCINATION AND INOCULATION AGAINST INFLUENZA: ICD-10-CM

## 2018-09-21 DIAGNOSIS — K80.20 CALCULUS OF GALLBLADDER WITHOUT CHOLECYSTITIS WITHOUT OBSTRUCTION: ICD-10-CM

## 2018-09-21 DIAGNOSIS — M79.645 THUMB PAIN, LEFT: ICD-10-CM

## 2018-09-21 DIAGNOSIS — M79.645 THUMB PAIN, LEFT: Primary | ICD-10-CM

## 2018-09-21 DIAGNOSIS — I10 HYPERTENSION GOAL BP (BLOOD PRESSURE) < 140/90: ICD-10-CM

## 2018-09-21 DIAGNOSIS — N52.9 IMPOTENCE OF ORGANIC ORIGIN: ICD-10-CM

## 2018-09-21 LAB
ALBUMIN SERPL-MCNC: 2.6 G/DL (ref 3.4–5)
ALP SERPL-CCNC: 124 U/L (ref 40–150)
ALT SERPL W P-5'-P-CCNC: 13 U/L (ref 0–70)
ANION GAP SERPL CALCULATED.3IONS-SCNC: 7 MMOL/L (ref 3–14)
AST SERPL W P-5'-P-CCNC: 34 U/L (ref 0–45)
BILIRUB DIRECT SERPL-MCNC: 1 MG/DL (ref 0–0.2)
BILIRUB SERPL-MCNC: 2.5 MG/DL (ref 0.2–1.3)
BUN SERPL-MCNC: 8 MG/DL (ref 7–30)
CALCIUM SERPL-MCNC: 8.6 MG/DL (ref 8.5–10.1)
CHLORIDE SERPL-SCNC: 115 MMOL/L (ref 94–109)
CO2 SERPL-SCNC: 23 MMOL/L (ref 20–32)
CREAT SERPL-MCNC: 1.07 MG/DL (ref 0.66–1.25)
ERYTHROCYTE [DISTWIDTH] IN BLOOD BY AUTOMATED COUNT: 16.6 % (ref 10–15)
GFR SERPL CREATININE-BSD FRML MDRD: 72 ML/MIN/1.7M2
GLUCOSE SERPL-MCNC: 104 MG/DL (ref 70–99)
HCT VFR BLD AUTO: 31.1 % (ref 40–53)
HGB BLD-MCNC: 10.2 G/DL (ref 13.3–17.7)
INR PPP: 1.6 (ref 0.86–1.14)
MCH RBC QN AUTO: 31.6 PG (ref 26.5–33)
MCHC RBC AUTO-ENTMCNC: 32.8 G/DL (ref 31.5–36.5)
MCV RBC AUTO: 96 FL (ref 78–100)
PLATELET # BLD AUTO: 44 10E9/L (ref 150–450)
POTASSIUM SERPL-SCNC: 3.2 MMOL/L (ref 3.4–5.3)
PROT SERPL-MCNC: 5.8 G/DL (ref 6.8–8.8)
RBC # BLD AUTO: 3.23 10E12/L (ref 4.4–5.9)
SODIUM SERPL-SCNC: 145 MMOL/L (ref 133–144)
WBC # BLD AUTO: 2.9 10E9/L (ref 4–11)

## 2018-09-21 PROCEDURE — 80048 BASIC METABOLIC PNL TOTAL CA: CPT | Performed by: INTERNAL MEDICINE

## 2018-09-21 PROCEDURE — 99214 OFFICE O/P EST MOD 30 MIN: CPT | Mod: 25 | Performed by: FAMILY MEDICINE

## 2018-09-21 PROCEDURE — 85610 PROTHROMBIN TIME: CPT | Performed by: INTERNAL MEDICINE

## 2018-09-21 PROCEDURE — 90686 IIV4 VACC NO PRSV 0.5 ML IM: CPT | Performed by: FAMILY MEDICINE

## 2018-09-21 PROCEDURE — 85027 COMPLETE CBC AUTOMATED: CPT | Performed by: INTERNAL MEDICINE

## 2018-09-21 PROCEDURE — 36415 COLL VENOUS BLD VENIPUNCTURE: CPT | Performed by: INTERNAL MEDICINE

## 2018-09-21 PROCEDURE — 80076 HEPATIC FUNCTION PANEL: CPT | Performed by: INTERNAL MEDICINE

## 2018-09-21 PROCEDURE — 73130 X-RAY EXAM OF HAND: CPT | Mod: RT

## 2018-09-21 PROCEDURE — 90471 IMMUNIZATION ADMIN: CPT | Performed by: FAMILY MEDICINE

## 2018-09-21 PROCEDURE — 73140 X-RAY EXAM OF FINGER(S): CPT | Mod: LT

## 2018-09-21 RX ORDER — SILDENAFIL CITRATE 20 MG/1
TABLET ORAL
Qty: 90 TABLET | Refills: 3 | Status: SHIPPED | OUTPATIENT
Start: 2018-09-21 | End: 2019-04-12

## 2018-09-21 NOTE — PROGRESS NOTES
SUBJECTIVE:   Ivan Bell is a 53 year old male who presents to clinic today for the following health issues:      - Right hand pain. Onset 6 weeks ago he was cutting a hole on concrete and the block twisted pushing pointer, middle and ring finger back. There is swelling to right hand and fingers, no redness or bruising. He is also now noticing left thumb pain. He is able to move the fingers but with pain.    - also addressed history of liver cirrhosis.    Problem list and histories reviewed & adjusted, as indicated.  Additional history: as documented    Patient Active Problem List   Diagnosis     Hypertriglyceridemia     Gouty arthropathy     Erectile dysfunction     CARDIOVASCULAR SCREENING; LDL GOAL LESS THAN 130     24 hour contact given to patient      Hypertension goal BP (blood pressure) < 140/90     Alcoholic cirrhosis of liver with ascites (H)     Calculus of gallbladder without cholecystitis     Nephrolithiasis     Past Surgical History:   Procedure Laterality Date     ANKLE SURGERY Left      COLONOSCOPY N/A 3/31/2016    Procedure: COLONOSCOPY;  Surgeon: Rhys Uriostegui MD;  Location:  GI     ESOPHAGOSCOPY, GASTROSCOPY, DUODENOSCOPY (EGD), COMBINED N/A 3/31/2016    Procedure: COMBINED ESOPHAGOSCOPY, GASTROSCOPY, DUODENOSCOPY (EGD);  Surgeon: Rhys Uriostegui MD;  Location:  GI     ESOPHAGOSCOPY, GASTROSCOPY, DUODENOSCOPY (EGD), COMBINED N/A 3/9/2018    Procedure: COMBINED ESOPHAGOSCOPY, GASTROSCOPY, DUODENOSCOPY (EGD), BIOPSY SINGLE OR MULTIPLE;  EGD;  Surgeon: Gonzalo Wahl MD;  Location:  GI     KNEE SURGERY Left      KNEE SURGERY Right      SIGMOIDOSCOPY FLEXIBLE N/A 10/31/2017    Procedure: SIGMOIDOSCOPY FLEXIBLE;;  Surgeon: Armaan Adams MD;  Location:  GI     TIPS Procedure  06/06/2018       Social History   Substance Use Topics     Smoking status: Former Smoker     Types: Dip, chew, snus or snuff     Quit date: 8/29/1998     Smokeless tobacco:  "Current User     Last attempt to quit: 10/24/2017      Comment: 1 tin per 10 days.     Alcohol use No      Comment: last etoh 2/14/16, did have Odouls ~8/2017     Family History   Problem Relation Age of Onset     Family History Negative Mother      Family History Negative Father      Hypertension Father      Cerebrovascular Disease Father 87     Breast Cancer Maternal Grandmother      Rheumatoid Arthritis Daughter      Depression Daughter      Cancer - colorectal No family hx of      Prostate Cancer No family hx of      Liver Disease No family hx of            Reviewed and updated as needed this visit by clinical staff  Tobacco  Allergies  Meds  Med Hx  Surg Hx  Fam Hx  Soc Hx      Reviewed and updated as needed this visit by Provider         ROS:  Constitutional, HEENT, cardiovascular, pulmonary, gi and gu systems are negative, except as otherwise noted.    OBJECTIVE:     /60  Pulse 70  Ht 5' 10\" (1.778 m)  Wt 190 lb 6 oz (86.4 kg)  BMI 27.32 kg/m2  Body mass index is 27.32 kg/(m^2).       GENERAL: Healthy, alert and no distress  EYES: Eyes grossly normal to inspection, conjunctivae and sclerae normal  RESP: Lungs clear to auscultation - no rales, rhonchi or wheezes  CV: Regular rate and rhythm, normal S1 S2, no murmur  MS: left thumb: There is tenderness to palpation at the proximal and distal joints, some lateral laxity.  Left hand shows tenderness to palpation over the middle finger and ring finger MCP joints.  NEURO: Normal strength and tone, mentation intact and speech normal  PSYCH: Mentation appears normal, affect normal/bright       Results for orders placed or performed in visit on 09/21/18   CBC with platelets   Result Value Ref Range    WBC 2.9 (L) 4.0 - 11.0 10e9/L    RBC Count 3.23 (L) 4.4 - 5.9 10e12/L    Hemoglobin 10.2 (L) 13.3 - 17.7 g/dL    Hematocrit 31.1 (L) 40.0 - 53.0 %    MCV 96 78 - 100 fl    MCH 31.6 26.5 - 33.0 pg    MCHC 32.8 31.5 - 36.5 g/dL    RDW 16.6 (H) 10.0 - 15.0 % "    Platelet Count 44 (LL) 150 - 450 10e9/L   Basic metabolic panel   Result Value Ref Range    Sodium 145 (H) 133 - 144 mmol/L    Potassium 3.2 (L) 3.4 - 5.3 mmol/L    Chloride 115 (H) 94 - 109 mmol/L    Carbon Dioxide 23 20 - 32 mmol/L    Anion Gap 7 3 - 14 mmol/L    Glucose 104 (H) 70 - 99 mg/dL    Urea Nitrogen 8 7 - 30 mg/dL    Creatinine 1.07 0.66 - 1.25 mg/dL    GFR Estimate 72 >60 mL/min/1.7m2    GFR Estimate If Black 87 >60 mL/min/1.7m2    Calcium 8.6 8.5 - 10.1 mg/dL   Hepatic panel   Result Value Ref Range    Bilirubin Direct 1.0 (H) 0.0 - 0.2 mg/dL    Bilirubin Total 2.5 (H) 0.2 - 1.3 mg/dL    Albumin 2.6 (L) 3.4 - 5.0 g/dL    Protein Total 5.8 (L) 6.8 - 8.8 g/dL    Alkaline Phosphatase 124 40 - 150 U/L    ALT 13 0 - 70 U/L    AST 34 0 - 45 U/L   INR   Result Value Ref Range    INR 1.60 (H) 0.86 - 1.14      X-rays of left thumb and right hand show no acute fracture, degenerative changes consistent with mild to moderate primary osteoarthritis.      ASSESSMENT/PLAN:     (M79.645) Thumb pain, left  (primary encounter diagnosis)  Comment: Possible collateral ligament tear.  Will refer for consultation with hand surgery.  Plan: XR Finger Left G/E 2 Views, ORTHO          REFERRAL          (M79.641) Metacarpophalangeal joint pain of right hand  Comment: Probable sprain.  Plan: XR Hand Right G/E 3 Views, ORTHO          REFERRAL, CANCELED: XR Finger Right G/E 2 Views        Monitor.    (N52.9) Erectile dysfunction  Comment: No contraindications.  Plan: sildenafil (REVATIO) 20 MG tablet        Reviewed side effects.    (I10) Hypertension goal BP (blood pressure) < 140/90  Comment: Resolved with abstinence from alcohol.  Not currently on hypertensive medications.      (Z23) Need for prophylactic vaccination and inoculation against influenza  Plan: FLU VACCINE, SPLIT VIRUS, IM (QUADRIVALENT)         [80906]- >3 YRS, Vaccine Administration,         Initial [64409]      (K70.31) Alcoholic cirrhosis of  liver with ascites (H)  Comment: See problem list.      (K80.20) Calculus of gallbladder without cholecystitis without obstruction  Comment: See problem list.    (N20.0) Nephrolithiasis  Comment: See problem list.            Patient Instructions   *    For the hand and thumb, the xrays are okay. Next step would be to see a hand specialist. Call [phone].     *    Great that you stopped smoking.     *    Call with any qestions.              Rosette Wills MD  [facility]      Injectable Influenza Immunization Documentation    1.  Is the person to be vaccinated sick today?   No    2. Does the person to be vaccinated have an allergy to a component   of the vaccine?   No  Egg Allergy Algorithm Link    3. Has the person to be vaccinated ever had a serious reaction   to influenza vaccine in the past?   No    4. Has the person to be vaccinated ever had Guillain-Barré syndrome?   No    Form completed by Digna Magallon CMA

## 2018-09-21 NOTE — MR AVS SNAPSHOT
After Visit Summary   9/21/2018    Ivan Bell    MRN: [id number]           Patient Information     Date Of Birth          1965        Visit Information        Provider Department      9/21/2018 8:00 AM Rosette Wills MD [facility]        Today's Diagnoses     Thumb pain, left    -  1    Metacarpophalangeal joint pain of right hand        Erectile dysfunction        Hypertension goal BP (blood pressure) < 140/90        Need for prophylactic vaccination and inoculation against influenza          Care Instructions    *    For the hand and thumb, the xrays are okay. Next step would be to see a hand specialist. Call [phone].     *    Great that you stopped smoking.     *    Call with any qestions.           Follow-ups after your visit        Additional Services     ORTHO  REFERRAL       [facility] is referring you to the Orthopedic  Services at [facility].       The  Representative will assist you in the coordination of your Orthopedic and Musculoskeletal Care as prescribed by your physician.    The  Representative will call you within 1 business day to help schedule your appointment, or you may contact the  Representative at:    All areas ~ [phone]     Type of Referral : Surgical / Specialist   Hand surgeon.       Timeframe requested: Routine    Coverage of these services is subject to the terms and limitations of your health insurance plan.  Please call member services at your health plan with any benefit or coverage questions.      If X-rays, CT or MRI's have been performed, please contact the facility where they were done to arrange for , prior to your scheduled appointment.  Please bring this referral request to your appointment and present it to your specialist.                  Your next 10 appointments already scheduled     Mar 11, 2019  7:30 AM CDT   Lab with  LAB  "   Health Lab ([facility])    [address]  1st Floor  [facility] [id number] [phone]            Mar 11, 2019  8:30 AM CDT   (Arrive by 8:15 AM)   Return General Liver with Gonzalo Miramontes MD   [facility] ([facility])    [address]  [address]  [facility] [place] [facility] [id number] [phone]              Who to contact     Normal or non-critical lab and imaging results will be communicated to you by [employer], letter or phone within 4 business days after the clinic has received the results. If you do not hear from us within 7 days, please contact the clinic through [employer] or phone. If you have a critical or abnormal lab result, we will notify you by phone as soon as possible.  Submit refill requests through [employer] or call your pharmacy and they will forward the refill request to us. Please allow 3 business days for your refill to be completed.          If you need to speak with a  for additional information , please call: [phone]           Additional Information About Your Visit        [employer] Information     [employer] gives you secure access to your electronic health record. If you see a primary care provider, you can also send messages to your care team and make appointments. If you have questions, please call your primary care clinic.  If you do not have a primary care provider, please call [phone] and they will assist you.        Care EveryWhere ID     This is your Care EveryWhere ID. This could be used by other organizations to access your [facility] medical records  [id number]        Your Vitals Were     Pulse Height BMI (Body Mass Index)             70 5' 10\" (1.778 m) 27.32 kg/m2          Blood Pressure from Last 3 Encounters:   09/21/18 122/60   09/10/18 135/74   08/23/18 131/73    Weight from Last 3 Encounters:   09/21/18 190 lb 6 oz (86.4 kg)   09/10/18 180 lb 3.2 oz (81.7 kg)   08/23/18 180 lb " 14.4 oz (82.1 kg)              We Performed the Following     FLU VACCINE, SPLIT VIRUS, IM (QUADRIVALENT) [90128]- >3 YRS     ORTHO  REFERRAL     Vaccine Administration, Initial [60216]          Today's Medication Changes          These changes are accurate as of 9/21/18  9:12 AM.  If you have any questions, ask your nurse or doctor.               Start taking these medicines.        Dose/Directions    sildenafil 20 MG tablet   Commonly known as:  REVATIO   Used for:  Impotence of organic origin   Started by:  Rosette Wills MD        Take 1-3  tablet (20 mg) by mouth daily as needed.   Quantity:  90 tablet   Refills:  3            Where to get your medicines      These medications were sent to [facility] [address]  [address] [address]     Phone:  [phone]     sildenafil 20 MG tablet                Primary Care Provider Office Phone # Fax #    Rosette Wills [phone] [address] [address]        Equal Access to Services     [facility] AH: Hadii aad ku hadasho Soomaali, waaxda luqadaha, qaybta kaalmada adeegyada, waxay idiin hayaan han gaitan . So [facility] [phone].    ATENCIÓN: Si habla español, tiene a lee disposición servicios gratuitos de asistencia lingüística. Llame al [phone].    We comply with applicable federal civil rights laws and [place] laws. We do not discriminate on the basis of race, color, national origin, age, disability, sex, sexual orientation, or gender identity.            Thank you!     Thank you for choosing [facility]  for your care. Our goal is always to provide you with excellent care. Hearing back from our patients is one way we can continue to improve our services. Please take a few minutes to complete the written survey that you may receive in the mail after your visit with us. Thank you!             Your Updated Medication List  - Protect others around you: Learn how to safely use, store and throw away your medicines at www.disposemymeds.org.          This list is accurate as of 9/21/18  9:12 AM.  Always use your most recent med list.                   Brand Name Dispense Instructions for use Diagnosis    lactulose 20 GM Packet    CEPHULAC    300 each    Take 1 packet (20 g) by mouth 4 times daily Goal of 3-4 loose BMs per day, extra doses if concerned for confusion or not at goal, may hold for that day if reaches goal    Hepatic encephalopathy (H)       MULTIPLE VITAMINS PO      Take 1 tablet by mouth daily        rifaximin 550 MG Tabs tablet    XIFAXAN    180 tablet    Take 1 tablet (550 mg) by mouth 2 times daily    Hepatic encephalopathy (H)       sildenafil 20 MG tablet    REVATIO    90 tablet    Take 1-3  tablet (20 mg) by mouth daily as needed.    Impotence of organic origin       TYLENOL PO      Take by mouth every 4 hours as needed for mild pain or fever

## 2018-09-21 NOTE — TELEPHONE ENCOUNTER
DATE:  9/21/2018   TIME OF RECEIPT FROM LAB:  0266  LAB TEST:  Platelets   LAB VALUE:  44  RESULTS GIVEN WITH READ-BACK TO (PROVIDER):  Kulwinder Romo RN  TIME LAB VALUE REPORTED TO PROVIDER:   2294

## 2018-09-21 NOTE — PATIENT INSTRUCTIONS
*    For the hand and thumb, the xrays are okay. Next step would be to see a hand specialist. Call [phone].     *    Great that you stopped smoking.     *    Call with any qestions.

## 2018-09-23 PROBLEM — K80.20 CALCULUS OF GALLBLADDER WITHOUT CHOLECYSTITIS: Status: ACTIVE | Noted: 2018-09-23

## 2018-10-03 ENCOUNTER — TRANSFERRED RECORDS (OUTPATIENT)
Dept: HEALTH INFORMATION MANAGEMENT | Facility: CLINIC | Age: 53
End: 2018-10-03

## 2018-11-05 DIAGNOSIS — K70.31 ALCOHOLIC CIRRHOSIS OF LIVER WITH ASCITES (H): Primary | ICD-10-CM

## 2018-11-05 DIAGNOSIS — K70.31 ALCOHOLIC CIRRHOSIS OF LIVER WITH ASCITES (H): ICD-10-CM

## 2018-11-05 LAB
ALBUMIN SERPL-MCNC: 2.6 G/DL (ref 3.4–5)
ALP SERPL-CCNC: 119 U/L (ref 40–150)
ALT SERPL W P-5'-P-CCNC: 17 U/L (ref 0–70)
ANION GAP SERPL CALCULATED.3IONS-SCNC: 8 MMOL/L (ref 3–14)
AST SERPL W P-5'-P-CCNC: 31 U/L (ref 0–45)
BILIRUB DIRECT SERPL-MCNC: 1 MG/DL (ref 0–0.2)
BILIRUB SERPL-MCNC: 2.9 MG/DL (ref 0.2–1.3)
BUN SERPL-MCNC: 9 MG/DL (ref 7–30)
CALCIUM SERPL-MCNC: 8.5 MG/DL (ref 8.5–10.1)
CHLORIDE SERPL-SCNC: 110 MMOL/L (ref 94–109)
CO2 SERPL-SCNC: 24 MMOL/L (ref 20–32)
CREAT SERPL-MCNC: 1.17 MG/DL (ref 0.66–1.25)
ERYTHROCYTE [DISTWIDTH] IN BLOOD BY AUTOMATED COUNT: 18.4 % (ref 10–15)
GFR SERPL CREATININE-BSD FRML MDRD: 65 ML/MIN/1.7M2
GLUCOSE SERPL-MCNC: 97 MG/DL (ref 70–99)
HCT VFR BLD AUTO: 36.8 % (ref 40–53)
HGB BLD-MCNC: 12.2 G/DL (ref 13.3–17.7)
INR PPP: 1.56 (ref 0.86–1.14)
MCH RBC QN AUTO: 31.5 PG (ref 26.5–33)
MCHC RBC AUTO-ENTMCNC: 33.2 G/DL (ref 31.5–36.5)
MCV RBC AUTO: 95 FL (ref 78–100)
PLATELET # BLD AUTO: 74 10E9/L (ref 150–450)
POTASSIUM SERPL-SCNC: 3.2 MMOL/L (ref 3.4–5.3)
PROT SERPL-MCNC: 5.8 G/DL (ref 6.8–8.8)
RBC # BLD AUTO: 3.87 10E12/L (ref 4.4–5.9)
SODIUM SERPL-SCNC: 142 MMOL/L (ref 133–144)
WBC # BLD AUTO: 5.3 10E9/L (ref 4–11)

## 2018-11-05 PROCEDURE — 85610 PROTHROMBIN TIME: CPT | Performed by: INTERNAL MEDICINE

## 2018-11-05 PROCEDURE — 85027 COMPLETE CBC AUTOMATED: CPT | Performed by: INTERNAL MEDICINE

## 2018-11-05 PROCEDURE — 80048 BASIC METABOLIC PNL TOTAL CA: CPT | Performed by: INTERNAL MEDICINE

## 2018-11-05 PROCEDURE — 80076 HEPATIC FUNCTION PANEL: CPT | Performed by: INTERNAL MEDICINE

## 2018-11-05 PROCEDURE — 36415 COLL VENOUS BLD VENIPUNCTURE: CPT | Performed by: INTERNAL MEDICINE

## 2018-11-07 ENCOUNTER — TELEPHONE (OUTPATIENT)
Dept: GASTROENTEROLOGY | Facility: CLINIC | Age: 53
End: 2018-11-07

## 2018-11-07 DIAGNOSIS — E87.6 HYPOKALEMIA: Primary | ICD-10-CM

## 2018-11-07 RX ORDER — POTASSIUM CHLORIDE 1500 MG/1
20 TABLET, EXTENDED RELEASE ORAL 2 TIMES DAILY
Qty: 120 TABLET | Refills: 3 | Status: ON HOLD | OUTPATIENT
Start: 2018-11-07 | End: 2019-03-22

## 2018-11-19 ENCOUNTER — TELEPHONE (OUTPATIENT)
Dept: FAMILY MEDICINE | Facility: CLINIC | Age: 53
End: 2018-11-19

## 2018-11-19 DIAGNOSIS — M79.643 PAIN OF HAND, UNSPECIFIED LATERALITY: Primary | ICD-10-CM

## 2018-11-19 NOTE — TELEPHONE ENCOUNTER
Pt is calling and states that Dr Wills referred him to [facility] and they advised him that he should have his left hand  fused together , he does not want to have this done and is asking for a referral to go to the [facility] for a second opinion. Please advise.     Abby Muller, [place]

## 2018-11-23 NOTE — TELEPHONE ENCOUNTER
Spoke with patient and he has to look for the visit summary to find out which surgeon he saw. Patient is going to look for this and call us back.    Alona Naylor RN

## 2018-11-23 NOTE — TELEPHONE ENCOUNTER
Called patient back; he saw Walker (spelling?) at [facility], who recommended fusing the hand together. He would like a second opinion, he has been reading about other techniques that would preserve hand function.  If possible, a doctor at [facility], as they have done his previous surgeries.    Would appreciate recommendation from Dr Wills.    Delphine Larry RN

## 2018-11-23 NOTE — TELEPHONE ENCOUNTER
Could you call him and ask who he saw? I do have several hand surgeons in the [place] I can recommend. Otherwise, he'd have to check with his insurance about coverage for the [facility]. Thank you.

## 2018-11-23 NOTE — TELEPHONE ENCOUNTER
I don't know the [facility] providers as well. He can call their office and request seeing a hand specialist. That's what I would recommend. Please update patient. Thank you.

## 2018-11-23 NOTE — TELEPHONE ENCOUNTER
Pt agrees with plan to call and ask for a hand specialist at [facility].  In order to be seen there, he will need a referral order entered.    Delphine Larry RN

## 2019-02-05 DIAGNOSIS — Z95.828 S/P TIPS (TRANSJUGULAR INTRAHEPATIC PORTOSYSTEMIC SHUNT): ICD-10-CM

## 2019-02-05 DIAGNOSIS — K70.31 ALCOHOLIC CIRRHOSIS OF LIVER WITH ASCITES (H): Primary | ICD-10-CM

## 2019-03-10 ENCOUNTER — HOSPITAL ENCOUNTER (INPATIENT)
Facility: CLINIC | Age: 54
LOS: 12 days | Discharge: HOME | End: 2019-03-22
Attending: EMERGENCY MEDICINE | Admitting: INTERNAL MEDICINE
Payer: COMMERCIAL

## 2019-03-10 ENCOUNTER — APPOINTMENT (OUTPATIENT)
Dept: ULTRASOUND IMAGING | Facility: CLINIC | Age: 54
End: 2019-03-10
Attending: EMERGENCY MEDICINE
Payer: COMMERCIAL

## 2019-03-10 DIAGNOSIS — E87.6 HYPOKALEMIA: ICD-10-CM

## 2019-03-10 DIAGNOSIS — R11.0 NAUSEA: ICD-10-CM

## 2019-03-10 DIAGNOSIS — R04.0 EPISTAXIS: ICD-10-CM

## 2019-03-10 DIAGNOSIS — K72.90 DECOMPENSATION OF CIRRHOSIS OF LIVER (H): Primary | ICD-10-CM

## 2019-03-10 DIAGNOSIS — R31.9 HEMATURIA SYNDROME: ICD-10-CM

## 2019-03-10 DIAGNOSIS — K72.90 DECOMPENSATED HEPATIC CIRRHOSIS (H): ICD-10-CM

## 2019-03-10 DIAGNOSIS — K74.60 DECOMPENSATED HEPATIC CIRRHOSIS (H): ICD-10-CM

## 2019-03-10 DIAGNOSIS — K74.60 DECOMPENSATION OF CIRRHOSIS OF LIVER (H): Primary | ICD-10-CM

## 2019-03-10 DIAGNOSIS — R42 DIZZINESS AND GIDDINESS: ICD-10-CM

## 2019-03-10 LAB
ABO + RH BLD: NORMAL
ABO + RH BLD: NORMAL
ALBUMIN SERPL-MCNC: 1.9 G/DL (ref 3.4–5)
ALP SERPL-CCNC: 329 U/L (ref 40–150)
ALT SERPL W P-5'-P-CCNC: 41 U/L (ref 0–70)
AMMONIA PLAS-SCNC: 71 UMOL/L (ref 10–50)
ANION GAP SERPL CALCULATED.3IONS-SCNC: 14 MMOL/L (ref 3–14)
AST SERPL W P-5'-P-CCNC: 171 U/L (ref 0–45)
BASOPHILS # BLD AUTO: 0 10E9/L (ref 0–0.2)
BASOPHILS NFR BLD AUTO: 0.5 %
BILIRUB DIRECT SERPL-MCNC: 24.1 MG/DL (ref 0–0.2)
BILIRUB SERPL-MCNC: 28.6 MG/DL (ref 0.2–1.3)
BLD GP AB SCN SERPL QL: NORMAL
BLOOD BANK CMNT PATIENT-IMP: NORMAL
BUN SERPL-MCNC: 18 MG/DL (ref 7–30)
CALCIUM SERPL-MCNC: 8.5 MG/DL (ref 8.5–10.1)
CHLORIDE SERPL-SCNC: 102 MMOL/L (ref 94–109)
CO2 SERPL-SCNC: 20 MMOL/L (ref 20–32)
COHGB MFR BLD: 1.7 % (ref 0–2)
CREAT SERPL-MCNC: 1.68 MG/DL (ref 0.66–1.25)
DIFFERENTIAL METHOD BLD: ABNORMAL
EOSINOPHIL # BLD AUTO: 0.5 10E9/L (ref 0–0.7)
EOSINOPHIL NFR BLD AUTO: 6.5 %
ERYTHROCYTE [DISTWIDTH] IN BLOOD BY AUTOMATED COUNT: 18.6 % (ref 10–15)
ETHANOL SERPL-MCNC: <0.01 G/DL
GFR SERPL CREATININE-BSD FRML MDRD: 45 ML/MIN/{1.73_M2}
GLUCOSE SERPL-MCNC: 113 MG/DL (ref 70–99)
HCT VFR BLD AUTO: 36.2 % (ref 40–53)
HGB BLD-MCNC: 12.8 G/DL (ref 13.3–17.7)
IMM GRANULOCYTES # BLD: 0.1 10E9/L (ref 0–0.4)
IMM GRANULOCYTES NFR BLD: 1 %
INR PPP: 2.09 (ref 0.86–1.14)
LIPASE SERPL-CCNC: 252 U/L (ref 73–393)
LYMPHOCYTES # BLD AUTO: 0.5 10E9/L (ref 0.8–5.3)
LYMPHOCYTES NFR BLD AUTO: 6.3 %
MAGNESIUM SERPL-MCNC: 2.1 MG/DL (ref 1.6–2.3)
MCH RBC QN AUTO: 34.6 PG (ref 26.5–33)
MCHC RBC AUTO-ENTMCNC: 35.4 G/DL (ref 31.5–36.5)
MCV RBC AUTO: 98 FL (ref 78–100)
MONOCYTES # BLD AUTO: 0.7 10E9/L (ref 0–1.3)
MONOCYTES NFR BLD AUTO: 8.2 %
NEUTROPHILS # BLD AUTO: 6.4 10E9/L (ref 1.6–8.3)
NEUTROPHILS NFR BLD AUTO: 77.5 %
NRBC # BLD AUTO: 0 10*3/UL
NRBC BLD AUTO-RTO: 0 /100
PLATELET # BLD AUTO: 80 10E9/L (ref 150–450)
POTASSIUM SERPL-SCNC: 2.1 MMOL/L (ref 3.4–5.3)
POTASSIUM SERPL-SCNC: 2.4 MMOL/L (ref 3.4–5.3)
POTASSIUM SERPL-SCNC: 2.5 MMOL/L (ref 3.4–5.3)
PROT SERPL-MCNC: 5.6 G/DL (ref 6.8–8.8)
RBC # BLD AUTO: 3.7 10E12/L (ref 4.4–5.9)
SODIUM SERPL-SCNC: 135 MMOL/L (ref 133–144)
SPECIMEN EXP DATE BLD: NORMAL
WBC # BLD AUTO: 8.3 10E9/L (ref 4–11)

## 2019-03-10 PROCEDURE — 86900 BLOOD TYPING SEROLOGIC ABO: CPT | Performed by: EMERGENCY MEDICINE

## 2019-03-10 PROCEDURE — 25000125 ZZHC RX 250: Performed by: EMERGENCY MEDICINE

## 2019-03-10 PROCEDURE — 25000128 H RX IP 250 OP 636: Performed by: EMERGENCY MEDICINE

## 2019-03-10 PROCEDURE — 82375 ASSAY CARBOXYHB QUANT: CPT | Performed by: EMERGENCY MEDICINE

## 2019-03-10 PROCEDURE — 83735 ASSAY OF MAGNESIUM: CPT | Performed by: EMERGENCY MEDICINE

## 2019-03-10 PROCEDURE — 96365 THER/PROPH/DIAG IV INF INIT: CPT | Mod: 59 | Performed by: EMERGENCY MEDICINE

## 2019-03-10 PROCEDURE — 96361 HYDRATE IV INFUSION ADD-ON: CPT | Performed by: EMERGENCY MEDICINE

## 2019-03-10 PROCEDURE — 83690 ASSAY OF LIPASE: CPT | Performed by: EMERGENCY MEDICINE

## 2019-03-10 PROCEDURE — 25000132 ZZH RX MED GY IP 250 OP 250 PS 637: Performed by: STUDENT IN AN ORGANIZED HEALTH CARE EDUCATION/TRAINING PROGRAM

## 2019-03-10 PROCEDURE — 84132 ASSAY OF SERUM POTASSIUM: CPT | Performed by: STUDENT IN AN ORGANIZED HEALTH CARE EDUCATION/TRAINING PROGRAM

## 2019-03-10 PROCEDURE — 85610 PROTHROMBIN TIME: CPT | Performed by: EMERGENCY MEDICINE

## 2019-03-10 PROCEDURE — 93005 ELECTROCARDIOGRAM TRACING: CPT

## 2019-03-10 PROCEDURE — 99285 EMERGENCY DEPT VISIT HI MDM: CPT | Mod: Z6 | Performed by: EMERGENCY MEDICINE

## 2019-03-10 PROCEDURE — 93975 VASCULAR STUDY: CPT | Mod: TC

## 2019-03-10 PROCEDURE — 86901 BLOOD TYPING SEROLOGIC RH(D): CPT | Performed by: EMERGENCY MEDICINE

## 2019-03-10 PROCEDURE — 25800030 ZZH RX IP 258 OP 636

## 2019-03-10 PROCEDURE — 25000132 ZZH RX MED GY IP 250 OP 250 PS 637

## 2019-03-10 PROCEDURE — 99285 EMERGENCY DEPT VISIT HI MDM: CPT | Mod: 25 | Performed by: EMERGENCY MEDICINE

## 2019-03-10 PROCEDURE — 96375 TX/PRO/DX INJ NEW DRUG ADDON: CPT | Performed by: EMERGENCY MEDICINE

## 2019-03-10 PROCEDURE — 87040 BLOOD CULTURE FOR BACTERIA: CPT

## 2019-03-10 PROCEDURE — 36415 COLL VENOUS BLD VENIPUNCTURE: CPT | Performed by: EMERGENCY MEDICINE

## 2019-03-10 PROCEDURE — 82248 BILIRUBIN DIRECT: CPT | Performed by: EMERGENCY MEDICINE

## 2019-03-10 PROCEDURE — 12000012 ZZH R&B MS OVERFLOW UMMC

## 2019-03-10 PROCEDURE — 99223 1ST HOSP IP/OBS HIGH 75: CPT | Mod: AI | Performed by: INTERNAL MEDICINE

## 2019-03-10 PROCEDURE — 93010 ELECTROCARDIOGRAM REPORT: CPT | Performed by: INTERNAL MEDICINE

## 2019-03-10 PROCEDURE — 85025 COMPLETE CBC W/AUTO DIFF WBC: CPT | Performed by: EMERGENCY MEDICINE

## 2019-03-10 PROCEDURE — 80053 COMPREHEN METABOLIC PANEL: CPT | Performed by: EMERGENCY MEDICINE

## 2019-03-10 PROCEDURE — 82140 ASSAY OF AMMONIA: CPT | Performed by: EMERGENCY MEDICINE

## 2019-03-10 PROCEDURE — 40000141 ZZH STATISTIC PERIPHERAL IV START W/O US GUIDANCE

## 2019-03-10 PROCEDURE — 36415 COLL VENOUS BLD VENIPUNCTURE: CPT | Performed by: STUDENT IN AN ORGANIZED HEALTH CARE EDUCATION/TRAINING PROGRAM

## 2019-03-10 PROCEDURE — 36415 COLL VENOUS BLD VENIPUNCTURE: CPT

## 2019-03-10 PROCEDURE — 84132 ASSAY OF SERUM POTASSIUM: CPT

## 2019-03-10 PROCEDURE — 25800030 ZZH RX IP 258 OP 636: Performed by: EMERGENCY MEDICINE

## 2019-03-10 PROCEDURE — 86850 RBC ANTIBODY SCREEN: CPT | Performed by: EMERGENCY MEDICINE

## 2019-03-10 PROCEDURE — 80320 DRUG SCREEN QUANTALCOHOLS: CPT | Performed by: EMERGENCY MEDICINE

## 2019-03-10 PROCEDURE — 25000132 ZZH RX MED GY IP 250 OP 250 PS 637: Performed by: EMERGENCY MEDICINE

## 2019-03-10 PROCEDURE — 25000128 H RX IP 250 OP 636

## 2019-03-10 PROCEDURE — P9047 ALBUMIN (HUMAN), 25%, 50ML: HCPCS

## 2019-03-10 RX ORDER — POTASSIUM CL/LIDO/0.9 % NACL 10MEQ/0.1L
10 INTRAVENOUS SOLUTION, PIGGYBACK (ML) INTRAVENOUS
Status: COMPLETED | OUTPATIENT
Start: 2019-03-10 | End: 2019-03-10

## 2019-03-10 RX ORDER — PANTOPRAZOLE SODIUM 40 MG/1
40 TABLET, DELAYED RELEASE ORAL
Status: DISCONTINUED | OUTPATIENT
Start: 2019-03-11 | End: 2019-03-22 | Stop reason: HOSPADM

## 2019-03-10 RX ORDER — MULTIPLE VITAMINS W/ MINERALS TAB 9MG-400MCG
1 TAB ORAL DAILY
Status: DISCONTINUED | OUTPATIENT
Start: 2019-03-10 | End: 2019-03-11 | Stop reason: ALTCHOICE

## 2019-03-10 RX ORDER — LACTULOSE 10 G/15ML
20 SOLUTION ORAL
Status: DISCONTINUED | OUTPATIENT
Start: 2019-03-10 | End: 2019-03-13

## 2019-03-10 RX ORDER — ALBUMIN (HUMAN) 12.5 G/50ML
75 SOLUTION INTRAVENOUS DAILY
Status: COMPLETED | OUTPATIENT
Start: 2019-03-10 | End: 2019-03-12

## 2019-03-10 RX ORDER — POTASSIUM CHLORIDE 1.5 G/1.58G
40 POWDER, FOR SOLUTION ORAL ONCE
Status: COMPLETED | OUTPATIENT
Start: 2019-03-10 | End: 2019-03-10

## 2019-03-10 RX ORDER — LACTULOSE 10 G/10G
20 SOLUTION ORAL 4 TIMES DAILY
Status: DISCONTINUED | OUTPATIENT
Start: 2019-03-10 | End: 2019-03-17

## 2019-03-10 RX ORDER — ONDANSETRON 2 MG/ML
8 INJECTION INTRAMUSCULAR; INTRAVENOUS ONCE
Status: COMPLETED | OUTPATIENT
Start: 2019-03-10 | End: 2019-03-10

## 2019-03-10 RX ORDER — LANOLIN ALCOHOL/MO/W.PET/CERES
100 CREAM (GRAM) TOPICAL DAILY
Status: COMPLETED | OUTPATIENT
Start: 2019-03-10 | End: 2019-03-14

## 2019-03-10 RX ORDER — SODIUM CHLORIDE, SODIUM LACTATE, POTASSIUM CHLORIDE, CALCIUM CHLORIDE 600; 310; 30; 20 MG/100ML; MG/100ML; MG/100ML; MG/100ML
INJECTION, SOLUTION INTRAVENOUS CONTINUOUS
Status: DISCONTINUED | OUTPATIENT
Start: 2019-03-10 | End: 2019-03-10

## 2019-03-10 RX ORDER — LACTULOSE 10 G/15ML
20 SOLUTION ORAL ONCE
Status: COMPLETED | OUTPATIENT
Start: 2019-03-10 | End: 2019-03-10

## 2019-03-10 RX ORDER — POTASSIUM CHLORIDE 1.5 G/1.58G
60 POWDER, FOR SOLUTION ORAL ONCE
Status: COMPLETED | OUTPATIENT
Start: 2019-03-10 | End: 2019-03-10

## 2019-03-10 RX ORDER — POTASSIUM CL/LIDO/0.9 % NACL 10MEQ/0.1L
10 INTRAVENOUS SOLUTION, PIGGYBACK (ML) INTRAVENOUS ONCE
Status: COMPLETED | OUTPATIENT
Start: 2019-03-10 | End: 2019-03-10

## 2019-03-10 RX ORDER — NALOXONE HYDROCHLORIDE 0.4 MG/ML
.1-.4 INJECTION, SOLUTION INTRAMUSCULAR; INTRAVENOUS; SUBCUTANEOUS
Status: DISCONTINUED | OUTPATIENT
Start: 2019-03-10 | End: 2019-03-22 | Stop reason: HOSPADM

## 2019-03-10 RX ORDER — LACTULOSE 10 G/15ML
100 SOLUTION ORAL
Status: DISCONTINUED | OUTPATIENT
Start: 2019-03-10 | End: 2019-03-22 | Stop reason: HOSPADM

## 2019-03-10 RX ORDER — FOLIC ACID 1 MG/1
1 TABLET ORAL DAILY
Status: DISCONTINUED | OUTPATIENT
Start: 2019-03-10 | End: 2019-03-22 | Stop reason: HOSPADM

## 2019-03-10 RX ORDER — PROMETHAZINE HYDROCHLORIDE 25 MG/ML
12.5 INJECTION, SOLUTION INTRAMUSCULAR; INTRAVENOUS ONCE
Status: DISCONTINUED | OUTPATIENT
Start: 2019-03-10 | End: 2019-03-19

## 2019-03-10 RX ORDER — POTASSIUM CHLORIDE 7.45 MG/ML
10 INJECTION INTRAVENOUS CONTINUOUS
Status: DISCONTINUED | OUTPATIENT
Start: 2019-03-10 | End: 2019-03-10 | Stop reason: ALTCHOICE

## 2019-03-10 RX ORDER — LIDOCAINE 40 MG/G
CREAM TOPICAL
Status: DISCONTINUED | OUTPATIENT
Start: 2019-03-10 | End: 2019-03-22 | Stop reason: HOSPADM

## 2019-03-10 RX ADMIN — Medication 10 MEQ: at 13:31

## 2019-03-10 RX ADMIN — MAGNESIUM SULFATE HEPTAHYDRATE: 500 INJECTION, SOLUTION INTRAMUSCULAR; INTRAVENOUS at 10:14

## 2019-03-10 RX ADMIN — SODIUM CHLORIDE, POTASSIUM CHLORIDE, SODIUM LACTATE AND CALCIUM CHLORIDE: 600; 310; 30; 20 INJECTION, SOLUTION INTRAVENOUS at 11:54

## 2019-03-10 RX ADMIN — RIFAXIMIN 550 MG: 550 TABLET ORAL at 13:01

## 2019-03-10 RX ADMIN — RIFAXIMIN 550 MG: 550 TABLET ORAL at 21:26

## 2019-03-10 RX ADMIN — Medication 100 MG: at 13:02

## 2019-03-10 RX ADMIN — LACTULOSE 20 G: 10 POWDER, FOR SOLUTION ORAL at 14:55

## 2019-03-10 RX ADMIN — POTASSIUM CHLORIDE 40 MEQ: 1.5 POWDER, FOR SOLUTION ORAL at 16:49

## 2019-03-10 RX ADMIN — Medication 10 MEQ: at 14:57

## 2019-03-10 RX ADMIN — FOLIC ACID 1 MG: 1 TABLET ORAL at 13:02

## 2019-03-10 RX ADMIN — Medication 10 MEQ: at 16:41

## 2019-03-10 RX ADMIN — Medication 1 MG: at 23:21

## 2019-03-10 RX ADMIN — POTASSIUM CHLORIDE 40 MEQ: 1.5 POWDER, FOR SOLUTION ORAL at 08:05

## 2019-03-10 RX ADMIN — Medication 10 MEQ: at 10:11

## 2019-03-10 RX ADMIN — ALBUMIN HUMAN 75 G: 0.25 SOLUTION INTRAVENOUS at 15:34

## 2019-03-10 RX ADMIN — PHYTONADIONE 10 MG: 10 INJECTION, EMULSION INTRAMUSCULAR; INTRAVENOUS; SUBCUTANEOUS at 12:06

## 2019-03-10 RX ADMIN — ONDANSETRON 8 MG: 2 INJECTION INTRAMUSCULAR; INTRAVENOUS at 06:51

## 2019-03-10 RX ADMIN — SODIUM CHLORIDE 1000 ML: 9 INJECTION, SOLUTION INTRAVENOUS at 06:51

## 2019-03-10 RX ADMIN — LACTULOSE 20 G: 20 SOLUTION ORAL at 07:30

## 2019-03-10 RX ADMIN — MULTIPLE VITAMINS W/ MINERALS TAB 1 TABLET: TAB at 13:02

## 2019-03-10 RX ADMIN — POTASSIUM CHLORIDE 60 MEQ: 1.5 POWDER, FOR SOLUTION ORAL at 21:26

## 2019-03-10 ASSESSMENT — ACTIVITIES OF DAILY LIVING (ADL)
RETIRED_COMMUNICATION: 0-->UNDERSTANDS/COMMUNICATES WITHOUT DIFFICULTY
ADLS_ACUITY_SCORE: 10
ADLS_ACUITY_SCORE: 10
FALL_HISTORY_WITHIN_LAST_SIX_MONTHS: NO
ADLS_ACUITY_SCORE: 10
COGNITION: 0 - NO COGNITION ISSUES REPORTED
TOILETING: 0-->INDEPENDENT
RETIRED_EATING: 0-->INDEPENDENT
BATHING: 0-->INDEPENDENT
SWALLOWING: 0-->SWALLOWS FOODS/LIQUIDS WITHOUT DIFFICULTY
DRESS: 0-->INDEPENDENT

## 2019-03-10 ASSESSMENT — MIFFLIN-ST. JEOR: SCORE: 1589.69

## 2019-03-10 NOTE — ED NOTES
"     Emergency Department Patient Sign-out       Brief HPI:  This is a 53 year old male signed out to me by Dr. Taylor.  See initial ED Provider note for details of the presentation.        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  Noted on initial exam to be quite jaundiced.  Wife and patient state increase in yellowness over the past 3-4 weeks.  Patient reinterviewed and admits to alcohol use in January of this year.          Exam:   Patient Vitals for the past 24 hrs:   BP Temp Temp src Pulse Resp SpO2 Height Weight   03/10/19 0715 131/72 -- -- -- -- 97 % -- --   03/10/19 0615 143/51 -- -- -- -- 98 % -- --   03/10/19 0613 143/51 98.4  F (36.9  C) Oral 96 18 99 % 1.778 m (5' 10\") 74.8 kg (165 lb)           ED RESULTS:   Results for orders placed or performed during the hospital encounter of 03/10/19 (from the past 24 hour(s))   CBC with platelets differential     Status: Abnormal    Collection Time: 03/10/19  6:42 AM   Result Value Ref Range    WBC 8.3 4.0 - 11.0 10e9/L    RBC Count 3.70 (L) 4.4 - 5.9 10e12/L    Hemoglobin 12.8 (L) 13.3 - 17.7 g/dL    Hematocrit 36.2 (L) 40.0 - 53.0 %    MCV 98 78 - 100 fl    MCH 34.6 (H) 26.5 - 33.0 pg    MCHC 35.4 31.5 - 36.5 g/dL    RDW 18.6 (H) 10.0 - 15.0 %    Platelet Count 80 (L) 150 - 450 10e9/L    Diff Method Automated Method     % Neutrophils 77.5 %    % Lymphocytes 6.3 %    % Monocytes 8.2 %    % Eosinophils 6.5 %    % Basophils 0.5 %    % Immature Granulocytes 1.0 %    Nucleated RBCs 0 0 /100    Absolute Neutrophil 6.4 1.6 - 8.3 10e9/L    Absolute Lymphocytes 0.5 (L) 0.8 - 5.3 10e9/L    Absolute Monocytes 0.7 0.0 - 1.3 10e9/L    Absolute Eosinophils 0.5 0.0 - 0.7 10e9/L    Absolute Basophils 0.0 0.0 - 0.2 10e9/L    Abs Immature Granulocytes 0.1 0 - 0.4 10e9/L    Absolute Nucleated RBC 0.0    Comprehensive metabolic panel     Status: Abnormal    " Collection Time: 03/10/19  6:42 AM   Result Value Ref Range    Sodium 135 133 - 144 mmol/L    Potassium 2.1 (LL) 3.4 - 5.3 mmol/L    Chloride 102 94 - 109 mmol/L    Carbon Dioxide 20 20 - 32 mmol/L    Anion Gap 14 3 - 14 mmol/L    Glucose 113 (H) 70 - 99 mg/dL    Urea Nitrogen 18 7 - 30 mg/dL    Creatinine 1.68 (H) 0.66 - 1.25 mg/dL    GFR Estimate 45 (L) >60 mL/min/[1.73_m2]    GFR Estimate If Black 52 (L) >60 mL/min/[1.73_m2]    Calcium 8.5 8.5 - 10.1 mg/dL    Bilirubin Total 28.6 (HH) 0.2 - 1.3 mg/dL    Albumin 1.9 (L) 3.4 - 5.0 g/dL    Protein Total 5.6 (L) 6.8 - 8.8 g/dL    Alkaline Phosphatase 329 (H) 40 - 150 U/L    ALT 41 0 - 70 U/L     (H) 0 - 45 U/L   Lipase     Status: None    Collection Time: 03/10/19  6:42 AM   Result Value Ref Range    Lipase 252 73 - 393 U/L   Ammonia     Status: Abnormal    Collection Time: 03/10/19  6:42 AM   Result Value Ref Range    Ammonia 71 (H) 10 - 50 umol/L   ABO/Rh type and screen     Status: None    Collection Time: 03/10/19  6:42 AM   Result Value Ref Range    ABO A     RH(D) Neg     Antibody Screen Neg     Test Valid Only At          [facility]    Specimen Expires 03/13/2019        ED MEDICATIONS:   Medications   potassium chloride (KLOR-CON) Packet 40 mEq (not administered)   0.9% sodium chloride BOLUS (1,000 mLs Intravenous New Bag 3/10/19 0651)   ondansetron (ZOFRAN) injection 8 mg (8 mg Intravenous Given 3/10/19 0651)   lactulose (CHRONULAC) solution 20 g (20 g Oral Given 3/10/19 7630)         Impression:    ICD-10-CM    1. Decompensated hepatic cirrhosis (H) K72.90 CANCELED: Magnesium       Plan:    Given patient's progressive symptoms over the past 3-4 weeks in light of acutely worsening jaundice and laboratories showing significant hypokalemia with potassium level at 2.1.  I have recommended admission for further evaluation and management.      Shaquille Dick MD  03/10/19 0837

## 2019-03-10 NOTE — ED NOTES
Received report and assumed care of patient.  Patient found on litter resting; PIV infusing bolus of NS.  Wife at bedside.  Patient denies any pain and no needs at this time.  Will perform orthostatic pressures.

## 2019-03-10 NOTE — CONSULTS
GASTROENTEROLOGY CONSULTATION      Date of Admission: 3/10/2019       Date of Consult: 3/10/2019          Chief Complaint:   We were asked by DELFINO Styles MD to evaluate this patient with alcoholic cirrhosis         ASSESSMENT AND RECOMMENDATIONS:   Assessment:  Ivan Bell is a 53 year old male with a history of alcohol misuse, decompensated alcoholic cirrhosis, history of ascites (s/p TIPS 5/14/18, 6/6/18), HE, EV and variceal bleed (Oct 2017) who presents to the emergency department for evaluation of dizziness, nausea and recent mental changes in setting of relapsed alcohol use.    # Decompensated liver cirrhosis:  # Alcoholic hepatitis:  Sober for 18 months, relapsed drinking in January 2019 Per patient's wife, noted to be more confused over last few days. Presentation suggestive of HE, with elevated transaminases suggestive of AH and intrahepatic cholestasis, no biliary dilation noted on imaging.  - HE: Grade I PTA, grade 0 at bedside  - EV/PHG: last EGD Apr 2018- medium EV with bandingx4, mild portal hypertensive gastropathy  - Ascites: Mild, US w/ doppler 3/10 with significant increase in TIPS stent velocities concerning for stenosis.  - HCC screening: no focal lesions on most recent imaging  - Coagulopathy: INR 2.09, PLTs 80, Hgb 12.8  - HRS/AMBER: Worsening creatinine at 1.68  MELD-Na score: 33 at 3/10/2019  6:42 AM  MELD score: 32 at 3/10/2019  6:42 AM  Calculated from:  Serum Creatinine: 1.68 mg/dL at 3/10/2019  6:42 AM  Serum Sodium: 135 mmol/L at 3/10/2019  6:42 AM  Total Bilirubin: 28.6 mg/dL at 3/10/2019  6:42 AM  INR(ratio): 2.09 at 3/10/2019  6:42 AM  Age: 53 years     Recommendations  - Neuro check q4H, given risk of progression   - PO Lactulose, titrate to 3-4 BMs per day  - Rifaximin 550 mg BID  - Will hold off on steroids for now, please complete infectious workup including dx paracentesis if feasible, UA, CXR and blood cultures  - Albumin challenge, closely monitor renal function  - Aggressive  electrolytes replacement  - Thiamine and folate  - Daily MELD labs  - PO PPI once daily  - Chem dep evaluation  - Low salt diet (<2 g/day)  - Nutritional service consult  - GI team will continue to follow with you, please call for questions/concerns     Patient care plan discussed with Dr. Be, GI staff physician.   Thank you for involving us in this patient's care.       Ulisses Briggs  Hepatology fellow  #[id number]   -------------------------------------------------------------------------------------------------------------------   History is obtained from the patient and the medical record.          History of Present Illness:   Ivan Bell is a 53 year old male with a history of alcohol misuse, decompensated alcoholic cirrhosis, history of ascites (s/p TIPS 5/14/18, 6/6/18), HE, EV and variceal bleed (Oct 2017) who presents to the emergency department for evaluation of dizziness.  Nausea and recent mental changes in setting of of relapsed alcohol use.  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 Per patient's wife since his recent alcohol relapse in January 2019, he was noted to be more jaundiced and weak. Per patient he had a drink (1/2 pint of blackberry ryan) two months ago but he stopped drinking then after. More recently he was noted to have intermittent confusional states, and being more forgetful.  Continued to take lactulose at home. Denied abdominal pain, distension, changes in BM (once a day), bloody stools, N/V, fevers or chills. No reported systemic symptoms. Not on home diuretics, and no recurrence of ascites after TIPS placement. No recent infection, IVDU or transfusions.            Past Medical History:   Reviewed and edited as appropriate  Past Medical History:   Diagnosis Date     Ascites      Cirrhosis (H)      Esophageal varices (H)      Hepatitis      Hypertension      Left calcaneus fracture 1/9/2006 January 16, 2006: Fell 10 feet from  ladder onto left foot on frozen ground on 1/9/06 at home.  Immediate pain and unable to walk- seen at [place] and diagnosed with calcaneus fracture     Portal vein thrombosis     left occlusion, partial main            Past Surgical History:   Reviewed and edited as appropriate   Past Surgical History:   Procedure Laterality Date     ANKLE SURGERY Left      COLONOSCOPY N/A 3/31/2016    Procedure: COLONOSCOPY;  Surgeon: Rhys Uriostegui MD;  Location:  GI     ESOPHAGOSCOPY, GASTROSCOPY, DUODENOSCOPY (EGD), COMBINED N/A 3/31/2016    Procedure: COMBINED ESOPHAGOSCOPY, GASTROSCOPY, DUODENOSCOPY (EGD);  Surgeon: Rhys Uriostegui MD;  Location:  GI     ESOPHAGOSCOPY, GASTROSCOPY, DUODENOSCOPY (EGD), COMBINED N/A 3/9/2018    Procedure: COMBINED ESOPHAGOSCOPY, GASTROSCOPY, DUODENOSCOPY (EGD), BIOPSY SINGLE OR MULTIPLE;  EGD;  Surgeon: Gonzalo Wahl MD;  Location:  GI     KNEE SURGERY Left      KNEE SURGERY Right      SIGMOIDOSCOPY FLEXIBLE N/A 10/31/2017    Procedure: SIGMOIDOSCOPY FLEXIBLE;;  Surgeon: Armaan Adams MD;  Location:  GI     TIPS Procedure  06/06/2018            Previous Endoscopy:     Results for orders placed or performed during the hospital encounter of 08/04/16   COLONOSCOPY   Result Value Ref Range    COLONOSCOPY       [address] [place] [address] [phone]     Endoscopy Department  _______________________________________________________________________________  Patient Name: Ivan Bell           Procedure Date: 8/4/2016 12:48 PM  MRN: [id number]                       Account Number: [id number]  YOB: 1965              Admit Type: Outpatient  Age: 50                                Gender: Male  Note Status: Finalized                Attending MD: Rianna Be MD  _______________________________________________________________________________     Procedure:           Colonoscopy  Indications:          Personal history of colonic polyps  Providers:           Rianna Be MD, Royer Rogel, RN  Referring MD:        Gonzalo Bales MD, Rosette Wills MD  Medicines:           Monitored Anesthesia Care  Complications:       No immediate complications.  _______________________________________________________________________________  Proced ure:           Pre-Anesthesia Assessment:                       - Prior to the procedure, a History and Physical was                        performed, and patient medications and allergies were                        reviewed. The patient is competent. The risks and                        benefits of the procedure and the sedation options and                        risks were discussed with the patient. All questions                        were answered and informed consent was obtained. Patient                        identification and proposed procedure were verified by                        the physician in the procedure room. Mental Status                        Examination: alert and oriented. Airway Examination:                        normal oropharyngeal airway and neck mobility.                        Respiratory Examination: clear to auscultation. CV                        Examination: normal. Prophylactic Antibiotics: The                        patient does not require proph ylactic antibiotics. Prior                        Anticoagulants: The patient has taken no previous                        anticoagulant or antiplatelet agents. ASA Grade                        Assessment: II - A patient with mild systemic disease.                        After reviewing the risks and benefits, the patient was                        deemed in satisfactory condition to undergo the                        procedure. The anesthesia plan was to use monitored                        anesthesia care (MAC). Immediately prior to                        administration of medications, the  patient was                        re-assessed for adequacy to receive sedatives. The heart                        rate, respiratory rate, oxygen saturations, blood                        pressure, adequacy of pulmonary ventilation, and                        response to care were monitored throughout the                        procedure. The physical status of the patient was                         re-assessed after the procedure.                       After obtaining informed consent, the colonoscope was                        passed under direct vision. Throughout the procedure,                        the patient's blood pressure, pulse, and oxygen                        saturations were monitored continuously. The Colonoscope                        was introduced through the anus and advanced to the                        cecum, identified by appendiceal orifice and ileocecal                        valve. The colonoscopy was performed without difficulty.                        The patient tolerated the procedure well.                                                                                   Findings:       A sessile polyp was found in the cecum. The polyp was 15 mm in size. The        polyp was removed with a hot snare. Resection and retrieval were        complete. Verification of patient identification for the specimen was        done by the physician using the inez ent's name and medical record number.       A single small-mouthed diverticulum was found in the sigmoid colon.                                                                                   Impression:          - One 15 mm polyp in the cecum. Resected and retrieved.                       - Diverticulosis in the sigmoid colon.  Recommendation:      - Repeat colonoscopy in 1 year to review the polypectomy                        site.                                                                                     electronically signed  by Rianna Be  ______________________  Rianna Be MD  2016 2:38 PM  I was physically present for the entire viewing portion of the exam.  __________________________  Signature of teaching physician  B4c/D4c  Number of Addenda: 0    Note Initiated On: 2016 12:48 PM              Social History:   Reviewed and edited as appropriate  Social History     Socioeconomic History     Marital status:      Spouse name: Not on file     Number of children: Not on file     Years of education: Not on file     Highest education level: Not on file   Occupational History     Not on file   Social Needs     Financial resource strain: Not on file     Food insecurity:     Worry: Not on file     Inability: Not on file     Transportation needs:     Medical: Not on file     Non-medical: Not on file   Tobacco Use     Smoking status: Former Smoker     Types: Dip, chew, snus or snuff     Last attempt to quit: 1998     Years since quittin.5     Smokeless tobacco: Current User     Last attempt to quit: 10/24/2017     Tobacco comment: 1 tin per 10 days.   Substance and Sexual Activity     Alcohol use: No     Alcohol/week: 10.5 oz     Types: 21 Cans of beer per week     Comment: last etoh 14, did have Odouls ~2017     Drug use: No     Sexual activity: Not on file   Lifestyle     Physical activity:     Days per week: Not on file     Minutes per session: Not on file     Stress: Not on file   Relationships     Social connections:     Talks on phone: Not on file     Gets together: Not on file     Attends Baptism service: Not on file     Active member of club or organization: Not on file     Attends meetings of clubs or organizations: Not on file     Relationship status: Not on file     Intimate partner violence:     Fear of current or ex partner: Not on file     Emotionally abused: Not on file     Physically abused: Not on file     Forced sexual activity: Not on file   Other Topics Concern      Parent/sibling w/ CABG, MI or angioplasty before 65F 55M? Not Asked   Social History Narrative     Not on file            Family History:   Reviewed and edited as appropriate  Family History   Problem Relation Age of Onset     Family History Negative Mother      Family History Negative Father      Hypertension Father      Cerebrovascular Disease Father 87     Breast Cancer Maternal Grandmother      Rheumatoid Arthritis Daughter      Depression Daughter      Cancer - colorectal No family hx of      Prostate Cancer No family hx of      Liver Disease No family hx of            Allergies:   Reviewed and edited as appropriate     Allergies   Allergen Reactions     Benadryl [Diphenhydramine] Other (See Comments)     Delirium (visual and auditory hallucinations)     Oxycodone Other (See Comments)     Delirium and constipation            Medications:     Current Facility-Administered Medications   Medication     albumin human 25 % injection 75 g     Daily 2 GRAM acetaminophen limit, unless fulminent liver failure or transaminases greater than or equal to 300 - 400, then none     folic acid (FOLVITE) tablet 1 mg     lactulose (CEPHULAC) Packet 20 g     lactulose (CHRONULAC) solution 20 g    Or     lactulose (CHRONULAC) solution for enema prep 100 g     lidocaine (LMX4) cream     lidocaine 1 % 0.1-1 mL     melatonin tablet 1 mg     multivitamin w/minerals (THERA-VIT-M) tablet 1 tablet     naloxone (NARCAN) injection 0.1-0.4 mg     potassium chloride 10 mEq in 100 mL intermittent infusion with 10 mg lidocaine     promethazine (PHENERGAN) IV injection 12.5 mg     rifaximin (XIFAXAN) tablet 550 mg     sodium chloride (PF) 0.9% PF flush 3 mL     sodium chloride (PF) 0.9% PF flush 3 mL     vitamin B1 (THIAMINE) tablet 100 mg             Review of Systems:   A complete review of systems was performed and is negative except as noted in the HPI           Physical Exam:   /62 (BP Location: Left arm)   Pulse 88   Temp 98.8  F  "(37.1  C) (Oral)   Resp 16   Ht 1.778 m (5' 10\")   Wt 74.8 kg (165 lb)   SpO2 96%   BMI 23.68 kg/m    Wt:   Wt Readings from Last 2 Encounters:   03/10/19 74.8 kg (165 lb)   09/21/18 86.4 kg (190 lb 6 oz)      Constitutional:  no acute distress  Eyes: Sclera icteric/injected  Neck: supple, thyroid normal size  CV: No edema  Respiratory: Unlabored breathing  Lymph: No axillary, submandibular, supraclavicular or inguinal lymphadenopathy  Abd: Nondistended, +bs, no hepatosplenomegaly, nontender, no peritoneal signs  Skin: warm, perfused, ++jaundice  Neuro: AAO x 3, No asterixis         Data:   Labs and imaging below were independently reviewed and interpreted    BMP  Recent Labs   Lab 03/10/19  1237 03/10/19  0642   NA  --  135   POTASSIUM 2.4* 2.1*   CHLORIDE  --  102   JULISSA  --  8.5   CO2  --  20   BUN  --  18   CR  --  1.68*   GLC  --  113*     CBC  Recent Labs   Lab 03/10/19  0642   WBC 8.3   RBC 3.70*   HGB 12.8*   HCT 36.2*   MCV 98   MCH 34.6*   MCHC 35.4   RDW 18.6*   PLT 80*     INR  Recent Labs   Lab 03/10/19  0642   INR 2.09*     LFTs  Recent Labs   Lab 03/10/19  0642   ALKPHOS 329*   *   ALT 41   BILITOTAL 28.6*   PROTTOTAL 5.6*   ALBUMIN 1.9*      PANC  Recent Labs   Lab 03/10/19  0642   LIPASE 252     ATTENDING NOTE, GASTROENTEROLOGY/HEPATOLOGY    I saw and discussed this patient with the fellow and participated in the decision making. I agree with the fellow's note. Rianna Be MD  "

## 2019-03-10 NOTE — ED PROVIDER NOTES
"  History     Chief Complaint   Patient presents with     Nausea     Dizziness     Hematuria     [facility]  Ivan Bell is a 53 year old male with PMH notable for cirrhosis s/p TIPS 6/6/2018, esophageal varices, who presents to the ED with nausea and lightheadedness. 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 He has felt somewhat lightheaded and nauseous this past week but not this severe. He has had several episodes of dry heaving but no emesis tonight. He had a brief nose bleed that stopped after a few minutes spontaneously. No abdominal pain, no fever. Patient has been having 2-3 BM/day with lactulose use. No bloody nor black BM. Patient endorses 2 episodes painless hematuria this past day. He has been getting gradually more jaundiced this past month. Ascites reportedly resolved after TIPS this  Past summer.     I have reviewed the Medications, Allergies, Past Medical and Surgical History, and Social History in the Epic system.    Review of Systems  A complete review of systems was performed with pertinent positives and negatives noted in the HPI, and all other systems negative.     Physical Exam   BP: 143/51  Pulse: 96  Temp: 98.4  F (36.9  C)  Resp: 18  Height: 177.8 cm (5' 10\")  Weight: 74.8 kg (165 lb)  SpO2: 99 %    Physical Exam  General: very jaundiced male. Appears stated age.   HENT: dry MM, no oropharyngeal lesions. Dried blood in left nare.   Eyes: PERRL, icteric sclerae  Cardio: regular rate. Regular rhythm. Extremities well perfused  Resp: Normal work of breathing, clear breath sounds  Chest/Back: no visual signs of trauma, no CVA tenderness  Abdomen: no tenderness, non-distended, no rebound, no guarding  Neuro: alert and fully oriented. CN II-XII grossly intact. Grossly normal strength and sensation in all extremities.   MSK: no deformities.   Integumentary/Skin: no rash visualized, jaundiced color  Psych: normal affect, normal " behavior    ED Course      Procedures        Critical Care time:  none       Labs Ordered and Resulted from Time of ED Arrival Up to the Time of Departure from the ED - No data to display         Assessments & Plan (with Medical Decision Making)   Patient presenting with dry heaves, lightheadedness, and hematuria. Vitals in the ED wnl. Initial differential diagnosis includes but not limited to hepatic encephalopathy, CO poisoning, hyperbilirubinemia, TIPS shunt failure, hematuria.     Broad lab work-up with CBC, CMP, lipase, ammonia, CO level, INR, UA, type and screen ordered. Abdominal ultrasound pending. Patient signed out early in his ED course to oncoming provider with plan for f/u of the above studies, likely admission pending completion of work-up.       Clinical Impression (preliminary):  Lightheadedness   Nausea   Epistaxis   Hematuria       Hitesh Taylor MD  Emergency Medicine     I have reviewed the nursing notes.  I have reviewed the findings, diagnosis, plan and need for follow up with the patient.  Current Discharge Medication List          Final diagnoses:   None       3/10/2019   [facility], [place], EMERGENCY DEPARTMENT     Hitesh Taylor MD  03/10/19 0719

## 2019-03-10 NOTE — ED NOTES
[facility], [place]   ED Nurse to Floor Handoff     Ivan Bell is a 53 year old male who speaks English and lives with a spouse,  in a home  They arrived in the ED by car from home    ED Chief Complaint: Nausea; Dizziness; and Hematuria    ED Dx;   Final diagnoses:   Decompensated hepatic cirrhosis (H)         Needed?: No    Allergies:   Allergies   Allergen Reactions     Benadryl [Diphenhydramine] Other (See Comments)     Delirium (visual and auditory hallucinations)     Oxycodone Other (See Comments)     Delirium and constipation   .  Past Medical Hx:   Past Medical History:   Diagnosis Date     Ascites      Cirrhosis (H)      Esophageal varices (H)      Hepatitis      Hypertension      Left calcaneus fracture 1/9/2006 January 16, 2006: Fell 10 feet from ladder onto left foot on frozen ground on 1/9/06 at home.  Immediate pain and unable to walk- seen at [place] and diagnosed with calcaneus fracture     Portal vein thrombosis     left occlusion, partial main      Baseline Mental status: WDL  Current Mental Status changes: at basesline    Infection present or suspected this encounter: no  Sepsis suspected: No  Isolation type: No active isolations     Activity level - Baseline/Home:  Independent  Activity Level - Current:   Independent    Bariatric equipment needed?: No    In the ED these meds were given:   Medications   0.9% sodium chloride BOLUS (0 mLs Intravenous Stopped 3/10/19 0804)   ondansetron (ZOFRAN) injection 8 mg (8 mg Intravenous Given 3/10/19 0651)   lactulose (CHRONULAC) solution 20 g (20 g Oral Given 3/10/19 0730)   potassium chloride (KLOR-CON) Packet 40 mEq (40 mEq Oral Given 3/10/19 0805)       Drips running?  Yes    Home pump  No    Current LDAs  Peripheral IV 03/10/19 Left Lower forearm (Active)   Number of days: 0       Wound 10/31/17 Left;Inner;Lower Thigh Reddened with petechia (Active)   Number of days: 495       Wound 10/31/17 Coccyx small hole  "over coccyx area (Active)   Number of days: 495       Incision/Surgical Site 05/14/18 Right Neck (Active)   Number of days: 300       Incision/Surgical Site 06/06/18 Right Neck (Active)   Number of days: 277       Incision/Surgical Site 06/06/18 Right Chest (Active)   Number of days: 277       Labs results:   Labs Ordered and Resulted from Time of ED Arrival Up to the Time of Departure from the ED   CBC WITH PLATELETS DIFFERENTIAL - Abnormal; Notable for the following components:       Result Value    RBC Count 3.70 (*)     Hemoglobin 12.8 (*)     Hematocrit 36.2 (*)     MCH 34.6 (*)     RDW 18.6 (*)     Platelet Count 80 (*)     Absolute Lymphocytes 0.5 (*)     All other components within normal limits   COMPREHENSIVE METABOLIC PANEL - Abnormal; Notable for the following components:    Potassium 2.1 (*)     Glucose 113 (*)     Creatinine 1.68 (*)     GFR Estimate 45 (*)     GFR Estimate If Black 52 (*)     Bilirubin Total 28.6 (*)     Albumin 1.9 (*)     Protein Total 5.6 (*)     Alkaline Phosphatase 329 (*)      (*)     All other components within normal limits   AMMONIA - Abnormal; Notable for the following components:    Ammonia 71 (*)     All other components within normal limits   INR - Abnormal; Notable for the following components:    INR 2.09 (*)     All other components within normal limits   LIPASE   CARBON MONOXIDE   MAGNESIUM   ROUTINE UA WITH MICROSCOPIC REFLEX TO CULTURE   DRUG ABUSE SCREEN 6 CHEM DEP URINE ([facility])   PERIPHERAL IV CATHETER   ORTHOSTATIC BLOOD PRESSURE AND PULSE   ABO/RH TYPE AND SCREEN       Imaging Studies: No results found for this or any previous visit (from the past 24 hour(s)).    Recent vital signs:   /72   Pulse 96   Temp 98.4  F (36.9  C) (Oral)   Resp 19   Ht 1.778 m (5' 10\")   Wt 74.8 kg (165 lb)   SpO2 97%   BMI 23.68 kg/m      Cardiac Rhythm: Normal Sinus  Pt needs tele? Yes  Skin/wound Issues: None    Code Status: Full Code    Pain control: pt had " none    Nausea control: good    Abnormal labs/tests/findings requiring intervention: potassium of 2.1--received K-kim 40 meq/ ammonia level of 71---received lactulose.     Family present during ED course? Yes   Family Comments/Social Situation comments: Patient live at home with wife and two dogs.  Patient works in snow removal; has essentially been working non-stop for the past 5 weeks.     Tasks needing completion: Patient still needs to provide urine speciment for UA and Utox    NICHOLAS CALI, RN  [phone] [facility]

## 2019-03-10 NOTE — ED TRIAGE NOTES
Pt arrived to the ER with c/o nausea and dry heaving. 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 Pt also reports blood in urine. Since yesterday. VSS and afebrile. Pt has hx of liver disease and is on lactulose.

## 2019-03-10 NOTE — H&P
[facility], [place]    History and Physical - Mardarcy 2 Service        Date of Admission:  3/10/2019    Assessment & Plan   Ivan Bell is a 53 year old male with history of NAFLD cirrhosis s/p TIPS who presents with superimposed etoh hepaitis w/o ascites in setting of relapsed alcohol use and additionally with AMBER     # NAFLD cirrhosis  # Alcohol hepatis, superimposed  # Alcohol use disorder  US Tips doesn't show thrombosis, but increased stent velocities concerning for stenosis. Per GI, not alarming but they are consulting and will provide recs. Relapsed drinking started ~ 2 months ago (had been 18 months sober), unclear amount of drinking but he said it was a total of 1/4 pint total that he drank over this period of time and his last drink was 3 weeks ago. No ascites on ultrasound, LFTs with alcohol hepatitis pattern with AST great than 3x ALT, direct hyperbilirubinemia, alk-phosphatemia. According to his wife, she thinks that he has been a bit more confused than baseline but nothing like florid hepatic encephalopathy which she has seen in the past. He restarted his lactulose 2 weeks ago. Maddrey's of 88    MELD-Na score: 33 at 3/10/2019  6:42 AM  MELD score: 32 at 3/10/2019  6:42 AM  Calculated from:  Serum Creatinine: 1.68 mg/dL at 3/10/2019  6:42 AM  Serum Sodium: 135 mmol/L at 3/10/2019  6:42 AM  Total Bilirubin: 28.6 mg/dL at 3/10/2019  6:42 AM  INR(ratio): 2.09 at 3/10/2019  6:42 AM  Age: 53 years      Consults  - Chem dep consult  - Hepatology consult    Other:  - CIWA monitoring w/o prn benzo, nursing to page if scoring high  - Maddrey's (using IRN, not PT) of 88, f/u with GI re steroids    Treatment  - Scheduled lactulose  - Rifaxamin  - PRN lactulose (aka [place] protocol)  - Vit K    # Hypokalemia  Hypokalemia to 2.1, receiving IV and oral replacement. EKG with flat T waves  - Aggressive replacement as above  - Recheck stat    # AMBER  Baseline SCr of 1.10 now elevated  to 1.68. US tips w/o hydronephrosis. Level of decompensation would make HRS less likely but will albumin challenge. Likely non HRS prerenal in setting of decreased PO  - UA with micro  - FeNa  - 75 g 25% albumin x 3 days    # Nicotine use disorder  Has 60 year pack history, quit smoking in 1994 but he still uses chewing tobacco. He is interested in quitting nicotine altogether and understands cancer risk associated with smokeless tobacco. Not interested in any nicotine replacement at this time.      Diet: Regular diet  Fluids: 125 ml/hr LR  DVT Prophylaxis: VTE Prophylaxis contraindicated due to elevated INR  Blue Catheter: not present  Code Status: Full, discussed with patient and wife    Disposition Plan   Expected discharge: 2 - 3 days, recommended to prior living arrangement once Resolution of AMBER, improved liver function.  Entered: Chace Bustamante MD 03/10/2019, 9:47 AM       The patient's care was discussed with the Attending Physician, Dr. Styles.    Chace Bustamante MD  [address], [place]  Pager: [phone]  Please see sticky note for cross cover information  ______________________________________________________________________    Chief Complaint   Lightheadedness  Alcohol use, wants to stop drinking  Jaundice  Mild confusion    History is obtained from the patient and wife    History of Present Illness   Ivan Bell is a 53 year old male who presents with history of cirrhosis s/p TIPS 6/2018 with prior complications of ascites, variceal bleed, esophageal varices s/p banding in 2018, alcohol use disorder, nephrolithiasis who presents with nausea and lightheadedness. This morning he was working (does [employer]) and felt lightheaded, slightly nauseated. Three weeks ago, became more jaundiced after relapse to drinking alcohol ~2 months ago. He said that he has stopped drinking since noticing the jaundice 3 weeks ago, and during the period of  "drinking (~5-6 weeks) he only at \"1/2 of 1/2 pint of blackberry ryan.\" His wife, Elicia, is in the room and says that she maybe has noticed some confusion. Given concern for returning hepatic encephalopathy, Ivan restarted his lactulose 2 weeks ago which he'd been off of since after getting his TIPS placement. He denies fevers/chills, vomiting, cough, shortness of breath, chest pain. He has had loose stools but only after restarting his lactulose. Has not noticed any BRB in stool, nor black tarry stool. He did notice a sore throat yesterday, which is mild. He also said that he did have some pinkish urine, but no flank pain or dysuria/freq/urgency. About a month ago he did pass a kidney stone, which he has done several times before as well. He has a 60 year pack hx (3 pack per day for 20 years) and he quit smoking in 1994 but he still chews tobacco (says he doesn't want lozenge/gum/patch). Wife (Elicia) thinks that drinking for him has mainly been a problem in the winter time and that it may be related to seasonal affective disorder. He has never been treated for this. Ivan is very interested in quitting alcohol once and for all, and said that he would like to \"start taking that pill for it.\" He would like to speak with chem dep while he is here. He denies other drug use.    Review of Systems    The 10 point Review of Systems is negative other than noted in the HPI or here.     Past Medical History    I have reviewed this patient's medical history and updated it with pertinent information if needed.   Past Medical History:   Diagnosis Date     Ascites      Cirrhosis (H)      Esophageal varices (H)      Hepatitis      Hypertension      Left calcaneus fracture 1/9/2006 January 16, 2006: Fell 10 feet from ladder onto left foot on frozen ground on 1/9/06 at home.  Immediate pain and unable to walk- seen at [place] and diagnosed with calcaneus fracture     Portal vein thrombosis     left occlusion, partial main    "     Past Surgical History   I have reviewed this patient's surgical history and updated it with pertinent information if needed.  Past Surgical History:   Procedure Laterality Date     ANKLE SURGERY Left      COLONOSCOPY N/A 3/31/2016    Procedure: COLONOSCOPY;  Surgeon: Rhys Uriostegui MD;  Location:  GI     ESOPHAGOSCOPY, GASTROSCOPY, DUODENOSCOPY (EGD), COMBINED N/A 3/31/2016    Procedure: COMBINED ESOPHAGOSCOPY, GASTROSCOPY, DUODENOSCOPY (EGD);  Surgeon: Rhys Uriostegui MD;  Location:  GI     ESOPHAGOSCOPY, GASTROSCOPY, DUODENOSCOPY (EGD), COMBINED N/A 3/9/2018    Procedure: COMBINED ESOPHAGOSCOPY, GASTROSCOPY, DUODENOSCOPY (EGD), BIOPSY SINGLE OR MULTIPLE;  EGD;  Surgeon: Gonzalo Wahl MD;  Location:  GI     KNEE SURGERY Left      KNEE SURGERY Right      SIGMOIDOSCOPY FLEXIBLE N/A 10/31/2017    Procedure: SIGMOIDOSCOPY FLEXIBLE;;  Surgeon: Armaan Adams MD;  Location:  GI     TIPS Procedure  06/06/2018        Social History   I have reviewed this patient's social history and updated it with pertinent information if needed. Ivan Bell  reports that he quit smoking about 20 years ago. His smoking use included dip, chew, snus or snuff. He uses smokeless tobacco. He reports that he does not drink alcohol or use drugs.    Family History   I have reviewed this patient's family history and updated it with pertinent information if needed.   Family History   Problem Relation Age of Onset     Family History Negative Mother      Family History Negative Father      Hypertension Father      Cerebrovascular Disease Father 87     Breast Cancer Maternal Grandmother      Rheumatoid Arthritis Daughter      Depression Daughter      Cancer - colorectal No family hx of      Prostate Cancer No family hx of      Liver Disease No family hx of        Prior to Admission Medications   Prior to Admission Medications   Prescriptions Last Dose Informant Patient Reported? Taking?    Acetaminophen (TYLENOL PO) Unknown at Unknown time Self Yes No   Sig: Take by mouth every 4 hours as needed for mild pain or fever   MULTIPLE VITAMINS PO Unknown at Unknown time Self Yes No   Sig: Take 1 tablet by mouth daily   lactulose (CEPHULAC) 20 GM Packet 3/9/2019 at Unknown time  No Yes   Sig: Take 1 packet (20 g) by mouth 4 times daily Goal of 3-4 loose BMs per day, extra doses if concerned for confusion or not at goal, may hold for that day if reaches goal   potassium chloride SA (K-DUR/KLOR-CON M) 20 MEQ CR tablet Unknown at Unknown time  No No   Sig: Take 1 tablet (20 mEq) by mouth 2 times daily   rifaximin (XIFAXAN) 550 MG TABS tablet Unknown at Unknown time  No No   Sig: Take 1 tablet (550 mg) by mouth 2 times daily   sildenafil (REVATIO) 20 MG tablet Unknown at Unknown time  No No   Sig: Take 1-3  tablet (20 mg) by mouth daily as needed.      Facility-Administered Medications: None     Allergies   Allergies   Allergen Reactions     Benadryl [Diphenhydramine] Other (See Comments)     Delirium (visual and auditory hallucinations)     Oxycodone Other (See Comments)     Delirium and constipation       Physical Exam   Vital Signs: Temp: 98.4  F (36.9  C) Temp src: Oral BP: 131/72 Pulse: 96 Heart Rate: 90 Resp: 19 SpO2: 97 % O2 Device: None (Room air)    Weight: 165 lbs 0 oz    General Appearance: Jaundiced, alert and oriented x 3  Eyes: scleral icterus, PERRLA, EOMI  HEENT: MMM, mild pharyngeal erythema    Respiratory: CTAB  Cardiovascular: I/VI systolic murmur. S1/S2 heard a/p/t windows, but diminished in mitral window  GI: Nontender, non-distended  Skin: Jaundice, no rashes on limited exam, no telangiectasias, spider angiomas  Neurologic: no asterixis, grossly neuro intact    Data   Data reviewed today: I reviewed all medications, new labs and imaging results over the last 24 hours. I personally reviewed the EKG tracing showing fairly flat t waves.    Recent Labs   Lab 03/10/19  0642   WBC 8.3   HGB  12.8*   MCV 98   PLT 80*   INR 2.09*      POTASSIUM 2.1*   CHLORIDE 102   CO2 20   BUN 18   CR 1.68*   ANIONGAP 14   JULISSA 8.5   *   ALBUMIN 1.9*   PROTTOTAL 5.6*   BILITOTAL 28.6*   ALKPHOS 329*   ALT 41   *   LIPASE 252

## 2019-03-10 NOTE — ED NOTES
8:39 AM  Results for orders placed or performed during the hospital encounter of 03/10/19   CBC with platelets differential   Result Value Ref Range    WBC 8.3 4.0 - 11.0 10e9/L    RBC Count 3.70 (L) 4.4 - 5.9 10e12/L    Hemoglobin 12.8 (L) 13.3 - 17.7 g/dL    Hematocrit 36.2 (L) 40.0 - 53.0 %    MCV 98 78 - 100 fl    MCH 34.6 (H) 26.5 - 33.0 pg    MCHC 35.4 31.5 - 36.5 g/dL    RDW 18.6 (H) 10.0 - 15.0 %    Platelet Count 80 (L) 150 - 450 10e9/L    Diff Method Automated Method     % Neutrophils 77.5 %    % Lymphocytes 6.3 %    % Monocytes 8.2 %    % Eosinophils 6.5 %    % Basophils 0.5 %    % Immature Granulocytes 1.0 %    Nucleated RBCs 0 0 /100    Absolute Neutrophil 6.4 1.6 - 8.3 10e9/L    Absolute Lymphocytes 0.5 (L) 0.8 - 5.3 10e9/L    Absolute Monocytes 0.7 0.0 - 1.3 10e9/L    Absolute Eosinophils 0.5 0.0 - 0.7 10e9/L    Absolute Basophils 0.0 0.0 - 0.2 10e9/L    Abs Immature Granulocytes 0.1 0 - 0.4 10e9/L    Absolute Nucleated RBC 0.0    Comprehensive metabolic panel   Result Value Ref Range    Sodium 135 133 - 144 mmol/L    Potassium 2.1 (LL) 3.4 - 5.3 mmol/L    Chloride 102 94 - 109 mmol/L    Carbon Dioxide 20 20 - 32 mmol/L    Anion Gap 14 3 - 14 mmol/L    Glucose 113 (H) 70 - 99 mg/dL    Urea Nitrogen 18 7 - 30 mg/dL    Creatinine 1.68 (H) 0.66 - 1.25 mg/dL    GFR Estimate 45 (L) >60 mL/min/[1.73_m2]    GFR Estimate If Black 52 (L) >60 mL/min/[1.73_m2]    Calcium 8.5 8.5 - 10.1 mg/dL    Bilirubin Total 28.6 (HH) 0.2 - 1.3 mg/dL    Albumin 1.9 (L) 3.4 - 5.0 g/dL    Protein Total 5.6 (L) 6.8 - 8.8 g/dL    Alkaline Phosphatase 329 (H) 40 - 150 U/L    ALT 41 0 - 70 U/L     (H) 0 - 45 U/L   Lipase   Result Value Ref Range    Lipase 252 73 - 393 U/L   Ammonia   Result Value Ref Range    Ammonia 71 (H) 10 - 50 umol/L   Carbon monoxide   Result Value Ref Range    Carbon Monoxide 1.7 0 - 2 %   INR   Result Value Ref Range    INR 2.09 (H) 0.86 - 1.14   Magnesium   Result Value Ref Range    Magnesium 2.1 1.6  - 2.3 mg/dL   EKG 12 lead   Result Value Ref Range    Interpretation ECG Click View Image link to view waveform and result    ABO/Rh type and screen   Result Value Ref Range    ABO A     RH(D) Neg     Antibody Screen Neg     Test Valid Only At          [facility]    Specimen Expires 03/13/2019      The case was discussed at length with the hematology fellow who agrees with admission and ultrasound.       EKG Interpretation:      Interpreted by Shaquille Caruso  Time reviewed: 7:09 AM  Symptoms at time of EKG: Generalized weakness  Rhythm: Normal sinus   Rate: Normal  Axis: Right Axis Deviation  Ectopy: None  Conduction:  prolonged QT  ST Segments/ T Waves: No ST-T wave changes, No acute ischemic changes and ST Segment Depression III, V2, V3, V4, V5 and V6  Q Waves: None  Comparison to prior: Compared with EKG from June 9, 2018, patient now has prolongation of QT, diffuse ST segment flattening as noted above    Clinical Impression: hypokalemia       Shaquille Caruso MD  03/10/19 0844

## 2019-03-10 NOTE — PLAN OF CARE
Vitals: Vitals stable, on room air. NSR with no ectopy, is on telemetry.  Endocrine: n/a  Labs: Increased LFT's, Potassium 2.1 in the ED, re-check 2.4 after their replacements @noon. Infusing KCL 10 meq IV X3 and 40 meq orally with recheck at 1900. Ammonia level 71, patient states taking his Lactulose due to recent jaundice.  Pain: Patient denies pain.  PRN's: n/a  Diet: Regular diet, ate cereal  and a popsicle at noon, fair appetite. He denies nausea, has PRN Zofran available and 1X IV Phenergan if needed.   LDA: PIV X2, new one re- placed. Albumin 300cc ordered, banana bag completed.  GI: Diarrhea with Lactulose, 5 BM's since midnight, team ok with holding for >4 BM's daily.He thought the color looked dark, instructed him to not flush for staff to assess.  : One void in the toilet with BM, needs several urine samples collected with next void. Patient stated his recent void was pink.  Skin: Jaundiced, skin intact.   Neuro: Alert and oriented, pleasant, family here most of the day.  Mobility: Stand by assist to the bathroom.  Education: Instructed to use the call light when up due to dizziness in the last few days.  Plan: Albumin, monitor potassium level.  1800 Update: Patient had a large, loose green BM, and was unable to void in the urinal. Patient reported that the urine was pink/red but difficult to determine in the toilet as it looked more brown/icteric. Patient is aware that a urine culture is needed with next void.

## 2019-03-11 ENCOUNTER — APPOINTMENT (OUTPATIENT)
Dept: OCCUPATIONAL THERAPY | Facility: CLINIC | Age: 54
End: 2019-03-11
Payer: COMMERCIAL

## 2019-03-11 LAB
ALBUMIN SERPL-MCNC: 2.3 G/DL (ref 3.4–5)
ALBUMIN UR-MCNC: NEGATIVE MG/DL
ALP SERPL-CCNC: 214 U/L (ref 40–150)
ALT SERPL W P-5'-P-CCNC: 28 U/L (ref 0–70)
AMPHETAMINES UR QL SCN: NEGATIVE
ANION GAP SERPL CALCULATED.3IONS-SCNC: 8 MMOL/L (ref 3–14)
APPEARANCE UR: CLEAR
AST SERPL W P-5'-P-CCNC: 115 U/L (ref 0–45)
BARBITURATES UR QL: NEGATIVE
BENZODIAZ UR QL: NEGATIVE
BILIRUB SERPL-MCNC: 26.8 MG/DL (ref 0.2–1.3)
BILIRUB UR QL STRIP: ABNORMAL
BUN SERPL-MCNC: 14 MG/DL (ref 7–30)
CALCIUM SERPL-MCNC: 8 MG/DL (ref 8.5–10.1)
CANNABINOIDS UR QL SCN: NEGATIVE
CHLORIDE SERPL-SCNC: 113 MMOL/L (ref 94–109)
CO2 SERPL-SCNC: 20 MMOL/L (ref 20–32)
COCAINE UR QL: NEGATIVE
COLOR UR AUTO: ABNORMAL
CREAT SERPL-MCNC: 1.44 MG/DL (ref 0.66–1.25)
CREAT UR-MCNC: 65 MG/DL
ERYTHROCYTE [DISTWIDTH] IN BLOOD BY AUTOMATED COUNT: 18.9 % (ref 10–15)
ETHANOL UR QL SCN: NEGATIVE
FRACT EXCRET NA UR+SERPL-RTO: 0.8 %
GFR SERPL CREATININE-BSD FRML MDRD: 54 ML/MIN/{1.73_M2}
GLUCOSE SERPL-MCNC: 82 MG/DL (ref 70–99)
GLUCOSE UR STRIP-MCNC: NEGATIVE MG/DL
HCT VFR BLD AUTO: 26.8 % (ref 40–53)
HGB BLD-MCNC: 9.2 G/DL (ref 13.3–17.7)
HGB UR QL STRIP: ABNORMAL
INR PPP: 2.25 (ref 0.86–1.14)
INTERPRETATION ECG - MUSE: NORMAL
INTERPRETATION ECG - MUSE: NORMAL
KETONES UR STRIP-MCNC: NEGATIVE MG/DL
LACTATE BLD-SCNC: 1.4 MMOL/L (ref 0.7–2)
LACTATE BLD-SCNC: 3 MMOL/L (ref 0.7–2)
LEUKOCYTE ESTERASE UR QL STRIP: ABNORMAL
MAGNESIUM SERPL-MCNC: 2.4 MG/DL (ref 1.6–2.3)
MCH RBC QN AUTO: 34.6 PG (ref 26.5–33)
MCHC RBC AUTO-ENTMCNC: 34.3 G/DL (ref 31.5–36.5)
MCV RBC AUTO: 101 FL (ref 78–100)
MUCOUS THREADS #/AREA URNS LPF: PRESENT /LPF
NITRATE UR QL: NEGATIVE
OPIATES UR QL SCN: NEGATIVE
PH UR STRIP: 7.5 PH (ref 5–7)
PLATELET # BLD AUTO: 42 10E9/L (ref 150–450)
POTASSIUM SERPL-SCNC: 2.5 MMOL/L (ref 3.4–5.3)
POTASSIUM SERPL-SCNC: 3.3 MMOL/L (ref 3.4–5.3)
POTASSIUM SERPL-SCNC: 3.7 MMOL/L (ref 3.4–5.3)
PROT SERPL-MCNC: 4.6 G/DL (ref 6.8–8.8)
RBC # BLD AUTO: 2.66 10E12/L (ref 4.4–5.9)
RBC #/AREA URNS AUTO: 164 /HPF (ref 0–2)
SODIUM SERPL-SCNC: 141 MMOL/L (ref 133–144)
SODIUM UR-SCNC: 53 MMOL/L
SOURCE: ABNORMAL
SP GR UR STRIP: 1.01 (ref 1–1.03)
UROBILINOGEN UR STRIP-MCNC: 2 MG/DL (ref 0–2)
WBC # BLD AUTO: 3.7 10E9/L (ref 4–11)
WBC #/AREA URNS AUTO: 13 /HPF (ref 0–5)

## 2019-03-11 PROCEDURE — 25000132 ZZH RX MED GY IP 250 OP 250 PS 637

## 2019-03-11 PROCEDURE — P9047 ALBUMIN (HUMAN), 25%, 50ML: HCPCS

## 2019-03-11 PROCEDURE — 36415 COLL VENOUS BLD VENIPUNCTURE: CPT | Performed by: STUDENT IN AN ORGANIZED HEALTH CARE EDUCATION/TRAINING PROGRAM

## 2019-03-11 PROCEDURE — 85610 PROTHROMBIN TIME: CPT

## 2019-03-11 PROCEDURE — 84132 ASSAY OF SERUM POTASSIUM: CPT | Performed by: STUDENT IN AN ORGANIZED HEALTH CARE EDUCATION/TRAINING PROGRAM

## 2019-03-11 PROCEDURE — 87086 URINE CULTURE/COLONY COUNT: CPT | Performed by: INTERNAL MEDICINE

## 2019-03-11 PROCEDURE — 87040 BLOOD CULTURE FOR BACTERIA: CPT | Performed by: STUDENT IN AN ORGANIZED HEALTH CARE EDUCATION/TRAINING PROGRAM

## 2019-03-11 PROCEDURE — 84300 ASSAY OF URINE SODIUM: CPT | Performed by: STUDENT IN AN ORGANIZED HEALTH CARE EDUCATION/TRAINING PROGRAM

## 2019-03-11 PROCEDURE — 80307 DRUG TEST PRSMV CHEM ANLYZR: CPT | Performed by: STUDENT IN AN ORGANIZED HEALTH CARE EDUCATION/TRAINING PROGRAM

## 2019-03-11 PROCEDURE — 80053 COMPREHEN METABOLIC PANEL: CPT

## 2019-03-11 PROCEDURE — 82570 ASSAY OF URINE CREATININE: CPT | Performed by: STUDENT IN AN ORGANIZED HEALTH CARE EDUCATION/TRAINING PROGRAM

## 2019-03-11 PROCEDURE — 99254 IP/OBS CNSLTJ NEW/EST MOD 60: CPT | Performed by: PSYCHIATRY & NEUROLOGY

## 2019-03-11 PROCEDURE — 25000128 H RX IP 250 OP 636

## 2019-03-11 PROCEDURE — 85027 COMPLETE CBC AUTOMATED: CPT

## 2019-03-11 PROCEDURE — 84132 ASSAY OF SERUM POTASSIUM: CPT

## 2019-03-11 PROCEDURE — 25000128 H RX IP 250 OP 636: Performed by: STUDENT IN AN ORGANIZED HEALTH CARE EDUCATION/TRAINING PROGRAM

## 2019-03-11 PROCEDURE — 97535 SELF CARE MNGMENT TRAINING: CPT | Mod: GO

## 2019-03-11 PROCEDURE — 83605 ASSAY OF LACTIC ACID: CPT | Performed by: INTERNAL MEDICINE

## 2019-03-11 PROCEDURE — 83605 ASSAY OF LACTIC ACID: CPT | Performed by: STUDENT IN AN ORGANIZED HEALTH CARE EDUCATION/TRAINING PROGRAM

## 2019-03-11 PROCEDURE — 25000132 ZZH RX MED GY IP 250 OP 250 PS 637: Performed by: STUDENT IN AN ORGANIZED HEALTH CARE EDUCATION/TRAINING PROGRAM

## 2019-03-11 PROCEDURE — 80320 DRUG SCREEN QUANTALCOHOLS: CPT | Performed by: STUDENT IN AN ORGANIZED HEALTH CARE EDUCATION/TRAINING PROGRAM

## 2019-03-11 PROCEDURE — 81001 URINALYSIS AUTO W/SCOPE: CPT | Performed by: STUDENT IN AN ORGANIZED HEALTH CARE EDUCATION/TRAINING PROGRAM

## 2019-03-11 PROCEDURE — 12000012 ZZH R&B MS OVERFLOW UMMC

## 2019-03-11 PROCEDURE — 36415 COLL VENOUS BLD VENIPUNCTURE: CPT

## 2019-03-11 PROCEDURE — 97165 OT EVAL LOW COMPLEX 30 MIN: CPT | Mod: GO

## 2019-03-11 PROCEDURE — 99233 SBSQ HOSP IP/OBS HIGH 50: CPT | Mod: GC | Performed by: INTERNAL MEDICINE

## 2019-03-11 PROCEDURE — 83735 ASSAY OF MAGNESIUM: CPT

## 2019-03-11 PROCEDURE — P9047 ALBUMIN (HUMAN), 25%, 50ML: HCPCS | Performed by: STUDENT IN AN ORGANIZED HEALTH CARE EDUCATION/TRAINING PROGRAM

## 2019-03-11 RX ORDER — POTASSIUM CHLORIDE 20MEQ/15ML
60 LIQUID (ML) ORAL EVERY 4 HOURS
Status: DISCONTINUED | OUTPATIENT
Start: 2019-03-11 | End: 2019-03-11

## 2019-03-11 RX ORDER — POTASSIUM CHLORIDE 1.5 G/1.58G
40 POWDER, FOR SOLUTION ORAL 2 TIMES DAILY
Status: DISCONTINUED | OUTPATIENT
Start: 2019-03-11 | End: 2019-03-11

## 2019-03-11 RX ORDER — POTASSIUM CL/LIDO/0.9 % NACL 10MEQ/0.1L
10 INTRAVENOUS SOLUTION, PIGGYBACK (ML) INTRAVENOUS
Status: DISCONTINUED | OUTPATIENT
Start: 2019-03-11 | End: 2019-03-11

## 2019-03-11 RX ORDER — ALBUMIN (HUMAN) 12.5 G/50ML
12.5 SOLUTION INTRAVENOUS ONCE
Status: COMPLETED | OUTPATIENT
Start: 2019-03-11 | End: 2019-03-11

## 2019-03-11 RX ORDER — POTASSIUM CHLORIDE 1.5 G/1.58G
40 POWDER, FOR SOLUTION ORAL ONCE
Status: COMPLETED | OUTPATIENT
Start: 2019-03-11 | End: 2019-03-11

## 2019-03-11 RX ORDER — POTASSIUM CHLORIDE 1.5 G/1.58G
60 POWDER, FOR SOLUTION ORAL ONCE
Status: DISCONTINUED | OUTPATIENT
Start: 2019-03-11 | End: 2019-03-11

## 2019-03-11 RX ORDER — MULTIVITAMIN,THERAPEUTIC
1 TABLET ORAL DAILY
Status: DISCONTINUED | OUTPATIENT
Start: 2019-03-12 | End: 2019-03-22 | Stop reason: HOSPADM

## 2019-03-11 RX ORDER — TRAZODONE HYDROCHLORIDE 50 MG/1
50 TABLET, FILM COATED ORAL AT BEDTIME
Status: DISCONTINUED | OUTPATIENT
Start: 2019-03-11 | End: 2019-03-14

## 2019-03-11 RX ADMIN — Medication 10 MEQ: at 10:37

## 2019-03-11 RX ADMIN — POTASSIUM CHLORIDE 40 MEQ: 1.5 POWDER, FOR SOLUTION ORAL at 08:09

## 2019-03-11 RX ADMIN — Medication 10 MEQ: at 15:27

## 2019-03-11 RX ADMIN — LACTULOSE 20 G: 10 POWDER, FOR SOLUTION ORAL at 20:03

## 2019-03-11 RX ADMIN — LACTULOSE 20 G: 10 POWDER, FOR SOLUTION ORAL at 09:16

## 2019-03-11 RX ADMIN — PANTOPRAZOLE SODIUM 40 MG: 40 TABLET, DELAYED RELEASE ORAL at 08:07

## 2019-03-11 RX ADMIN — TRAZODONE HYDROCHLORIDE 50 MG: 50 TABLET ORAL at 22:42

## 2019-03-11 RX ADMIN — Medication 10 MEQ: at 14:18

## 2019-03-11 RX ADMIN — Medication 10 MEQ: at 09:16

## 2019-03-11 RX ADMIN — FOLIC ACID 1 MG: 1 TABLET ORAL at 08:08

## 2019-03-11 RX ADMIN — ALBUMIN HUMAN 75 G: 0.25 SOLUTION INTRAVENOUS at 08:09

## 2019-03-11 RX ADMIN — POTASSIUM CHLORIDE 60 MEQ: 20 SOLUTION ORAL at 12:07

## 2019-03-11 RX ADMIN — ALBUMIN HUMAN 12.5 G: 0.25 SOLUTION INTRAVENOUS at 18:12

## 2019-03-11 RX ADMIN — LACTULOSE 20 G: 10 POWDER, FOR SOLUTION ORAL at 15:32

## 2019-03-11 RX ADMIN — LACTULOSE 20 G: 10 POWDER, FOR SOLUTION ORAL at 12:06

## 2019-03-11 RX ADMIN — Medication 10 MEQ: at 11:41

## 2019-03-11 RX ADMIN — RIFAXIMIN 550 MG: 550 TABLET ORAL at 08:08

## 2019-03-11 RX ADMIN — POTASSIUM CHLORIDE 40 MEQ: 1.5 POWDER, FOR SOLUTION ORAL at 21:25

## 2019-03-11 RX ADMIN — LACTULOSE 20 G: 20 SOLUTION ORAL at 06:42

## 2019-03-11 RX ADMIN — RIFAXIMIN 550 MG: 550 TABLET ORAL at 20:03

## 2019-03-11 RX ADMIN — MULTIPLE VITAMINS W/ MINERALS TAB 1 TABLET: TAB at 08:08

## 2019-03-11 RX ADMIN — Medication 100 MG: at 08:07

## 2019-03-11 ASSESSMENT — ACTIVITIES OF DAILY LIVING (ADL)
ADLS_ACUITY_SCORE: 10
ADLS_ACUITY_SCORE: 10
ADLS_ACUITY_SCORE: 11
ADLS_ACUITY_SCORE: 10

## 2019-03-11 ASSESSMENT — MIFFLIN-ST. JEOR: SCORE: 1599.21

## 2019-03-11 NOTE — PROGRESS NOTES
"CLINICAL NUTRITION SERVICES - ASSESSMENT NOTE     Nutrition Prescription    Malnutrition Status:    Severe malnutrition in the context of acute on chronic illness    Recommendations already ordered by Registered Dietitian (RD):  Continue Breeze supplements (250 kcal, 0 grams protein, 0mg K+, try Gelatein Plus (150 kcal, 20 grams protein, 150 mg K+)    Change to MVI without minerals (d/t preference of avoidance of Cu/Mn with high Tbili)    Future/Additional Recommendations:  Continue thiamine and folic acid supplementation.       REASON FOR ASSESSMENT  Ivan Bell is a/an 53 year old male assessed by the dietitian for Provider Order - malnutrition in the setting of chronic liver disease    PMH of decompensated alcoholic cirrhosis, ascites (s/p TIPS 2018), HE, EV.  Admitted for confusion, worsening liver function.     NUTRITION HISTORY  Patient reports \"good PO intake\".  Patient's wife notes his appetite has been decreased over the last month.  When he has an appetite, he eats well and most foods.  He reports he sometimes doesn't get lunch in.  Will try to eat cereal in AM and have a dinner daily.  Has tried his wife's shakes (protein powder, greek yogurt, peanut butter, bananas, etc), but doesn't care for them.  He has tried protein bars in the past.      Patient notes he has been educated on a low sodium diet (writer notes documentation of this from 12/2017) and says, \"nothing from a can\".      CURRENT NUTRITION ORDERS  Diet: 2 g Sodium + Boost Breeze between meals  Intake/Tolerance: Interested in trying Boost Breeze (peach) during visit.     LABS  K+ 2.5 (low)     MEDICATIONS  MVI with minerals  Folate  Thiamine    ANTHROPOMETRICS  Height: 177.8 cm (5' 10\")  Most Recent Weight: 75.8 kg (167 lb 1.6 oz)    IBW: 75.5 kg  BMI: Normal BMI  Weight History: Patient reports stable weight.  Feels he is dry currently, denies edema.  No ascites per US.   Dosing Weight: 76 kg (actual)    ASSESSED NUTRITION NEEDS  Estimated " Energy Needs: 6198-7259 kcals/day (25 - 30 kcals/kg)  Justification: Maintenance  Estimated Protein Needs:  grams protein/day (1 - 1.5 grams of pro/kg)  Justification: Maintenance/Hypercatabolism of liver disease  Estimated Fluid Needs: 1 mL/kcal   Justification: Maintenance    PHYSICAL FINDINGS  Jaundice    MALNUTRITION  % Intake: </=75% for >/= 1 month (severe)  % Weight Loss: None noted  Subcutaneous Fat Loss: Facial region: Moderate and Thoracic/intercostal: Moderate  Muscle Loss: Facial & jaw region:  Mild-moderate and Thoracic region (clavicle, acromium bone, deltoid, trapezius, pectoral):  Mild-moderate  Fluid Accumulation/Edema: None noted  Malnutrition Diagnosis: Severe malnutrition in the context of acute on chronic illness    NUTRITION DIAGNOSIS  Inadequate oral intake related to decreased appetite as evidenced by patient eating 2 small meals daily over the last month.     INTERVENTIONS  Implementation  Nutrition Education: Provided education on oral supplement options.  Encouraged avoidance of prolonged periods without PO intake as this will lead to accelerated muscle loss.  Encouraged ongoing adequate protein intake.     Medical food supplement therapy- (see above).  Discussed with wife where patient can obtain the supplements he is receiving this admission for use at home.      Goals  Patient to consume % of nutritionally adequate meal trays TID, or the equivalent with supplements/snacks.     Monitoring/Evaluation  Progress toward goals will be monitored and evaluated per protocol.    Citlali Conway MS, RD, LD  Pager [phone]

## 2019-03-11 NOTE — PROGRESS NOTES
"[facility]    Hepatology Follow-up    CC: Dizziness                  AMS    Dx: Decompensated alcoholic cirrhosis, grade 1 HE                  AH              AMBER  24 hour events:  Persistent hypokalemia, creatinine and LFTs continues to improve    Subjective:  Alert and oriented, denies abd pain, distension, N/V  Patient denies fevers, sweats or chills.    Medications  Current Facility-Administered Medications   Medication Dose Route Frequency     albumin human  75 g Intravenous Daily     folic acid  1 mg Oral Daily     lactulose  20 g Oral 4x Daily     multivitamin w/minerals  1 tablet Oral Daily     pantoprazole  40 mg Oral QAM AC     potassium chloride  60 mEq Oral Q4H     potassium chloride with lidocaine  10 mEq Intravenous Q1H     promethazine  12.5 mg Intravenous Once     rifaximin  550 mg Oral BID     sodium chloride (PF)  3 mL Intracatheter Q8H     vitamin B1  100 mg Oral Daily       Review of systems  A 10-point review of systems was negative.    Examination  /71   Pulse 88   Temp 99.1  F (37.3  C) (Oral)   Resp 16   Ht 1.778 m (5' 10\")   Wt 75.8 kg (167 lb 1.6 oz)   SpO2 96%   BMI 23.98 kg/m      Intake/Output Summary (Last 24 hours) at 3/11/2019 1050  Last data filed at 3/11/2019 1038  Gross per 24 hour   Intake 500 ml   Output 901 ml   Net -401 ml       Gen- NAD, A+Ox3, icteric  CVS- RRR  RS- CTA  Abd- Soft, NT, ND, + BS  Extr-No edema  Neuro- No asterexis  Skin- Jaundice  Psych- normal mood  Lym- no LAD    Laboratory  Lab Results   Component Value Date     03/11/2019    POTASSIUM 2.5 03/11/2019    CHLORIDE 113 03/11/2019    CO2 20 03/11/2019    BUN 14 03/11/2019    CR 1.44 03/11/2019       Lab Results   Component Value Date    BILITOTAL 26.8 03/11/2019    ALT 28 03/11/2019     03/11/2019    ALKPHOS 214 03/11/2019       Lab Results   Component Value Date    WBC 3.7 03/11/2019    HGB 9.2 03/11/2019     03/11/2019    PLT 42 03/11/2019       Lab " Results   Component Value Date    INR 2.25 03/11/2019     MELD-Na score: 31 at 3/11/2019  6:14 AM  MELD score: 31 at 3/11/2019  6:14 AM  Calculated from:  Serum Creatinine: 1.44 mg/dL at 3/11/2019  6:14 AM  Serum Sodium: 141 mmol/L (Rounded to 137 mmol/L) at 3/11/2019  6:14 AM  Total Bilirubin: 26.8 mg/dL at 3/11/2019  6:14 AM  INR(ratio): 2.25 at 3/11/2019  6:14 AM  Age: 53 years    Assessment  53 year old male with a history of alcohol misuse, decompensated alcoholic cirrhosis, history of ascites (s/p TIPS 5/14/18, 6/6/18), HE, EV and variceal bleed (Oct 2017) who presents for evaluation of dizziness, nausea and recent mental changes in setting of relapsed alcohol use.  Sober for 18 months, relapsed drinking in January 2019 Per patient's wife, noted to be more confused over last few days. Presentation suggestive of grade 1 HE, with elevated transaminases suggestive of AH and intrahepatic cholestasis, no biliary dilation noted on imaging.  Last EGD Apr 2018- medium EV with bandingx4, mild portal hypertensive gastropathy. US w/ doppler 3/10 with mild ascites and significant increase in TIPS stent velocities concerning for stenosis.  Renal function and LFTs continues to improve with gentle hydration, suspect low volume status on presentation.   Recommendations  - Continue neuro check q4H  - Continue PO Lactulose, titrate to 3-4 BMs per day, avoid over-titration of lactulose to prevent dehydration/FWD  - Continue rifaximin 550 mg BID  - Albumin challenge, closely monitor renal function  - Aggressive electrolytes replacement  - Thiamine and folate  - Daily MELD labs  - PO PPI once daily  - Chem dep evaluation  - Low salt diet (<2 g/day)  - Protein-calorie supplementation  - GI team will continue to follow with you, please call for questions/concerns     Patient care plan discussed with Dr. Be, GI staff physician.      Ulisses Briggs MD  Hepatology  #[id number]    ATTENDING NOTE, GASTROENTEROLOGY/HEPATOLOGY    I saw  and discussed this patient with the fellow and participated in the decision making. I agree with the fellow's note. Rianna Be MD

## 2019-03-11 NOTE — PROGRESS NOTES
" 03/11/19 1638   Quick Adds   Type of Visit Initial Occupational Therapy Evaluation   Living Environment   Lives With spouse   Living Arrangements house   Home Accessibility stairs within home;stairs to enter home   Number of Stairs, Main Entrance one   Number of Stairs, Within Home, Primary (12)   Transportation Anticipated family or friend will provide;car, drives self   Living Environment Comment Pt reports all needs met on main level but does go down to lower level to use pool table. Pt with tub shower.    Self-Care   Usual Activity Tolerance moderate   Current Activity Tolerance moderate   Equipment Currently Used at Home none   Activity/Exercise/Self-Care Comment Pt report working still, plowing snow. Pt reports IND wit hmed management as only taking 1 medication   Functional Level   Ambulation 0-->independent   Transferring 0-->independent   Toileting 0-->independent   Bathing 0-->independent   Dressing 0-->independent   Eating 0-->independent   Communication 0-->understands/communicates without difficulty   Swallowing 0-->swallows foods/liquids without difficulty   Cognition 1 - attention or memory deficits   Fall history within last six months no   Which of the above functional risks had a recent onset or change? cognition   Prior Functional Level Comment Pt previously IND with I/ADLs.    General Information   Onset of Illness/Injury or Date of Surgery - Date 03/10/18   Referring Physician Chace Bustamante MD   Patient/Family Goals Statement return home and to work   Additional Occupational Profile Info/Pertinent History of Current Problem \"53 year old male with a history of alcohol misuse, decompensated alcoholic cirrhosis, history of ascites (s/p TIPS 5/14/18, 6/6/18), HE, EV and variceal bleed (Oct 2017) who presents to the emergency department for evaluation of dizziness, nausea and recent mental changes in setting of relapsed alcohol use.\"   Precautions/Limitations no known " precautions/limitations   Weight-Bearing Status - LUE full weight-bearing   Weight-Bearing Status - RUE full weight-bearing   Weight-Bearing Status - LLE full weight-bearing   Weight-Bearing Status - RLE full weight-bearing   General Info Comments Activity: up with assist   Cognitive Status Examination   Orientation orientation to person, place and time   Level of Consciousness alert   Follows Commands (Cognition) WNL   Memory impaired   Attention Sustained attention impaired   Visual Perception   Visual Perception No deficits were identified   Visual Perception Comments needs glasses for reading   Integumentary/Edema   Integumentary/Edema no deficits were identifed   Posture   Posture forward head position;protracted shoulders   Range of Motion (ROM)   ROM Comment BUE ROM WNL   Strength   Strength Comments MMT: BUE grossly 5/5   Coordination   Upper Extremity Coordination No deficits were identified   Mobility   Bed Mobility Bed mobility skill: Supine to sit;Bed mobility skill: Sit to supine   Bed Mobility Skill: Sit to Supine   Level of [place]: Sit/Supine independent   Bed Mobility Skill: Supine to Sit   Level of [place]: Supine/Sit independent   Transfer Skill: Sit to Stand   Level of [place]: Sit/Stand stand-by assist   Balance   Balance Comments Pt steady on feet    Toileting   Level of [place]: Toilet independent   Instrumental Activities of Daily Living (IADL)   Previous Responsibilities yardwork;medication management;driving;work   Activities of Daily Living Analysis   Impairments Contributing to Impaired Activities of Daily Living cognition impaired   General Therapy Interventions   Planned Therapy Interventions ADL retraining;IADL retraining;cognition;home program guidelines;risk factor education   Clinical Impression   Criteria for Skilled Therapeutic Interventions Met yes, treatment indicated   OT Diagnosis decreased safety with I/ADLs   Influenced by the following impairments  "impaired cognition   Assessment of Occupational Performance 1-3 Performance Deficits   Identified Performance Deficits work, transportation (driving), med management   Clinical Decision Making (Complexity) Low complexity   Therapy Frequency 5 times/wk   Predicted Duration of Therapy Intervention (days/wks) 1 week   Anticipated Discharge Disposition Home with Assist   Risks and Benefits of Treatment have been explained. Yes   Patient, Family & other staff in agreement with plan of care Yes   [facility] TM \"6 Clicks\"   2016, Trustees of [facility], under license to [employer].  All rights reserved.   6 Clicks Short Forms Daily Activity Inpatient Short Form   [facility]  \"6 Clicks\" Daily Activity Inpatient Short Form   1. Putting on and taking off regular lower body clothing? 4 - None   2. Bathing (including washing, rinsing, drying)? 4 - None   3. Toileting, which includes using toilet, bedpan or urinal? 4 - None   4. Putting on and taking off regular upper body clothing? 4 - None   5. Taking care of personal grooming such as brushing teeth? 4 - None   6. Eating meals? 4 - None   Daily Activity Raw Score (Score out of 24.Lower scores equate to lower levels of function) 24   Total Evaluation Time   Total Evaluation Time (Minutes) 8     "

## 2019-03-11 NOTE — PLAN OF CARE
RN [id number]:  Tmax 99.1; intermittently tachycardic low 100's. Otherwise vitally stable on RA. Sepsis protocol triggered at ~1500 with Lactic: 3.0. [place] 2 team paged with results & VS. Tele NSR/sinus tachycardia. Denies pain and nausea. 2gm Na diet with fair appetite. Voiding adequate amounts. BM x3 today - Lactulose administered as scheduled. K+ 2.5 with AM labs replaced with IV + PO (see MAR). Recheck at 1400: 3.7 Additional recheck scheduled for 1900 this evening. Urine sample sent, but requiring one more for excretion of potassium. Patient up with SBA. Will continue to monitor and treat per plan of care.

## 2019-03-11 NOTE — PLAN OF CARE
7A PT: PT orders acknowledged and appreciated. Per discussion with OT, pt limited by cognitive deficits and SOB with activity, though presents with no unsteadiness or balance concerns with mobility. Pt appropriate for 1 discipline to follow, no skilled IP PT needs at this time, OT to continue to follow for progression of functional mobility, ADLs, and cognition. PT to complete orders and defer.

## 2019-03-11 NOTE — PLAN OF CARE
"/68 (BP Location: Right arm)   Pulse 88   Temp 99.3  F (37.4  C) (Oral)   Resp 16   Ht 1.778 m (5' 10\")   Wt 74.8 kg (165 lb)   SpO2 99%   BMI 23.68 kg/m       VSS on RA, afebrile. Tele- NSR. Denies pain & nausea. Given PRN melatonin 1 mg x 1 at bedtime. A&O x 4.  mL icteric + occurrence x 2. Pt reminded of need to collect urine for lab. Clean urinal at bedside for collection. No BM overnight. Blood culture drawn. Fair appetite on regular diet; pt concerned about decreased appetite over last ~2 weeks. R & L PIVs NS locked. Pt up independently, asked to call due to lightheadedness in ED today. Bed alarm on, call light in reach. Pt educated about effects of ammonia on mental status and managing ammonia levels with lactulose & xifaxan. LABS: K recheck 2.5 @ 1945; 60 mEq PO given, recheck in AM. Creat 1.68. Tot bili 28.6. Alk phos 329. ALT 41. . Ammonia 71. INR 2.09. Continue to monitor and update [facility] 2 team with acute changes.    "

## 2019-03-11 NOTE — CONSULTS
Consult Date:  03/11/2019      PSYCHIATRIC CONSULTATION      IDENTIFICATION:  Mr. Ivan Bell is a 53-year-old white male who is currently hospitalized with decompensated cirrhosis, status post TIPS procedure with superimposed alcoholic hepatitis.  I am asked to evaluate his chemical dependency by Dr. Bustamante.      HISTORY OF PRESENT ILLNESS:  Mr. Bell reports that he started drinking at age 14 and has generally been a binge drinker.  He says once he gets started, he has difficulty stopping.  He reports that his most recent sobriety was about 15 months prior to January of this year.  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  When the snow quits falling he becomes increasingly bored and lethargic and those are the times when he starts drinking.  She does not view him as depressed during those times, simply lazy and bored.  However, he has very poor sleep most of the time.  Recently he has had very poor appetite.  He denies decreased interest or suicidal ideation.  He has had decreased energy but this is likely from his medical illness.  There is also a question of anxiety disorder as the patient is often staying up all night long, watching the weather forecast so he knows when to jump out of bed and begin plowing snow.  During these times, he does seem to have some anxiety and in general has significant insomnia.      Given his anxiety and insomnia, I thought it might be helpful to add a little Remeron.  However, in reviewing his laboratory results, I note that he has significant kidney issues including an elevated creatinine and most notably a chronic hypokalemia that has been difficult to treat for unknown reasons.  He also has very abnormal liver function tests.  Because of his very abnormal laboratories, any of the medications that are traditionally used to treat alcohol use disorder would be inappropriate.  The patient is aware  "that the alcohol use is potentially fatal.  He would like to stop and is perfectly comfortable with the idea of outpatient chemical dependency treatment.  I did give the patient the phone number of [facility] intake.  The patient does have insurance and likely will not require a Rule 25.  However, I will be recommending that the treatment team get a social work consult to make sure that this patient does have the resources necessary to receive outpatient alcohol treatment.      PAST MEDICAL HISTORY:  The patient has history of ascites, cirrhosis, esophageal varices, hepatitis, hypertension, left calcaneus fracture, and portal vein thrombosis.      ALLERGIES:  BENADRYL and OXYCODONE.      FAMILY HISTORY:  The patient reports that his brother  of alcohol use.  He has a daughter with depression.      SOCIAL HISTORY:  The patient has a 60-pack-year history of smoking cigarettes.  He has quit cigarettes but is now chewing snuff.  He denies other substances of abuse.  He has a very supportive wife and works as a snow plow  and contractor, as mentioned above.      REVIEW OF SYSTEMS:  Today, the patient denied headache or problems with vision or hearing.  He denied chest pain, shortness of breath, abdominal pain.  He denied genitourinary symptoms or problems with muscles, skin or joints.  There are no known endocrinopathies.  He has a number of significant laboratory abnormalities suggesting a severe kidney and liver disease.  He also has thrombocytopenia and anemia.  His INR is still elevated at 2.25 and his creatinine is still low, but at 3.7, which is significantly better than yesterday.      MENTAL STATUS EXAMINATION:  On my interview, the patient was a pleasant, cooperative, ill-appearing white male.  His mood was reported as \"okay.\"  His affect was somewhat restricted.  His speech was coherent and goal oriented.  His associations tight.  His thought processes logical and linear.  His content of thought was " without psychosis or suicidal ideation.  Recent memory appears somewhat impaired.  He tended to look to his wife to answer questions about the last several months.  His long-term memory appears better preserved.  Fund of knowledge, use of language and concentration were within normal limits.  He is alert and oriented x 3.  Insight and judgment are currently intact, but have certainly been guarded as far as alcohol goes.        Recent vital signs include a temperature of 98.4, heart rate of 88, respiration rate of 16 with 100% oxygen saturation and a blood pressure of 131/70.  Muscle strength and tone seem decreased, and the patient has had very significant weight loss.      ASSESSMENT:  Alcohol use disorder and unspecified anxiety disorder.      RECOMMENDATION:  Outpatient chemical dependency treatment.  Follow up with his local physician.  If his laboratory results improve significantly, he may be a candidate for outpatient pharmacologic treatment of substance use disorder, but at the present time he is too physically ill. Please consult social work        ROGELIO SILVERMAN MD             D: 2019   T: 2019   MT: WT      Name:     AMADO HAMMER   MRN:      [id number]        Account:       [id number]   :      1965           Consult Date:  2019      Document: [id number]       cc: DONTAE Wills MD

## 2019-03-11 NOTE — PLAN OF CARE
OT 7A  Discharge Planner OT   Patient plan for discharge: home  Current status: Pt IND for bed mobility. SBA for functional mobility ~150 x 2; SOB noted but tolerating okay. SBA for transfers. Administered MoCA 8.2; pt scored 17/30 with deficits most apparent in delayed recall, attention, and language. Pt likely with baseline deficits due to liver disease but does endorse not being at baseline cognitively this admission demonstrating some insight   Barriers to return to prior living situation: cognition, medical status   Recommendations for discharge: home with increased assist/supervision with I/ADLs until return to baseline cognition   Rationale for recommendations: Spouse supportive and both pt and spouse verbalizing insight and agreeableness to recommendations.       Entered by: Holli Wang 03/11/2019 4:35 PM

## 2019-03-11 NOTE — PROVIDER NOTIFICATION
Patient's K+ level: 2.5 this AM. Provider notified. Will replace per orders and continue to monitor.

## 2019-03-11 NOTE — PROGRESS NOTES
[facility], [place]    Sepsis Evaluation Progress Note    Date of Service: 03/11/2019    I was called to see Ivan Bell due to abnormal vital signs triggering the Sepsis SIRS screening alert. (low WBCs, mild tachycardia) He is not known to have an infection.     Physical Exam    Vital Signs:  Temp: 98.6  F (37  C) Temp src: Oral BP: 129/59   Heart Rate: 97 Resp: 16 SpO2: 94 % O2 Device: None (Room air)      Lab:  Lactic Acid   Date Value Ref Range Status   06/09/2018 1.4 0.7 - 2.1 mmol/L Final     Lactate for Sepsis Protocol   Date Value Ref Range Status   03/11/2019 3.0 (H) 0.7 - 2.0 mmol/L Final     Comment:     Significant value called to and read back by  DILIA JAMES ON 7A AT 17 39 03/11/19 BY AEG         The patient is at baseline mental status. (confirmed with primary team that admitted him)    The rest of their physical exam: Patient is comfortable on room air, mildly tachycardic, abdomen nontender, CVAs nontender, A&O x3.     Assessment and Plan    The SIRS and exam findings are likely due to alcoholic hepatitis atop possible hepatorenal syndrome, there is no sign of sepsis at this time.     Intervention: 12.5 gm of 25% albumin atop current albumin challenge, with lactate recheck at approximately 1930 tonight (current draw 3.0).     Disposition: The patient will remain on the current unit. We will continue to monitor this patient closely.    Cuauhtemoc Nichols MD

## 2019-03-12 ENCOUNTER — APPOINTMENT (OUTPATIENT)
Dept: OCCUPATIONAL THERAPY | Facility: CLINIC | Age: 54
End: 2019-03-12
Payer: COMMERCIAL

## 2019-03-12 LAB
ALBUMIN SERPL-MCNC: 2.8 G/DL (ref 3.4–5)
ALP SERPL-CCNC: 174 U/L (ref 40–150)
ALT SERPL W P-5'-P-CCNC: 26 U/L (ref 0–70)
ANION GAP SERPL CALCULATED.3IONS-SCNC: 9 MMOL/L (ref 3–14)
AST SERPL W P-5'-P-CCNC: 95 U/L (ref 0–45)
BACTERIA SPEC CULT: NO GROWTH
BILIRUB DIRECT SERPL-MCNC: 21.6 MG/DL (ref 0–0.2)
BILIRUB SERPL-MCNC: 30.8 MG/DL (ref 0.2–1.3)
BUN SERPL-MCNC: 13 MG/DL (ref 7–30)
CALCIUM SERPL-MCNC: 8.4 MG/DL (ref 8.5–10.1)
CHLORIDE SERPL-SCNC: 117 MMOL/L (ref 94–109)
CO2 SERPL-SCNC: 16 MMOL/L (ref 20–32)
CREAT SERPL-MCNC: 1.36 MG/DL (ref 0.66–1.25)
ERYTHROCYTE [DISTWIDTH] IN BLOOD BY AUTOMATED COUNT: 18.7 % (ref 10–15)
GFR SERPL CREATININE-BSD FRML MDRD: 57 ML/MIN/{1.73_M2}
GLUCOSE SERPL-MCNC: 85 MG/DL (ref 70–99)
HCT VFR BLD AUTO: 25.2 % (ref 40–53)
HGB BLD-MCNC: 8.5 G/DL (ref 13.3–17.7)
INR PPP: 2.31 (ref 0.86–1.14)
LACTATE BLD-SCNC: 1.8 MMOL/L (ref 0.7–2)
Lab: NORMAL
MCH RBC QN AUTO: 35.1 PG (ref 26.5–33)
MCHC RBC AUTO-ENTMCNC: 33.7 G/DL (ref 31.5–36.5)
MCV RBC AUTO: 104 FL (ref 78–100)
PLATELET # BLD AUTO: 40 10E9/L (ref 150–450)
POTASSIUM SERPL-SCNC: 3 MMOL/L (ref 3.4–5.3)
POTASSIUM SERPL-SCNC: 3.3 MMOL/L (ref 3.4–5.3)
POTASSIUM UR-SCNC: 40 MMOL/L
PROT SERPL-MCNC: 4.9 G/DL (ref 6.8–8.8)
RBC # BLD AUTO: 2.42 10E12/L (ref 4.4–5.9)
SODIUM SERPL-SCNC: 142 MMOL/L (ref 133–144)
SPECIMEN SOURCE: NORMAL
WBC # BLD AUTO: 3.8 10E9/L (ref 4–11)

## 2019-03-12 PROCEDURE — 12000012 ZZH R&B MS OVERFLOW UMMC

## 2019-03-12 PROCEDURE — 97535 SELF CARE MNGMENT TRAINING: CPT | Mod: GO | Performed by: OCCUPATIONAL THERAPIST

## 2019-03-12 PROCEDURE — 25000132 ZZH RX MED GY IP 250 OP 250 PS 637: Performed by: STUDENT IN AN ORGANIZED HEALTH CARE EDUCATION/TRAINING PROGRAM

## 2019-03-12 PROCEDURE — 85027 COMPLETE CBC AUTOMATED: CPT

## 2019-03-12 PROCEDURE — 99233 SBSQ HOSP IP/OBS HIGH 50: CPT | Mod: GC | Performed by: STUDENT IN AN ORGANIZED HEALTH CARE EDUCATION/TRAINING PROGRAM

## 2019-03-12 PROCEDURE — 25000132 ZZH RX MED GY IP 250 OP 250 PS 637

## 2019-03-12 PROCEDURE — 85610 PROTHROMBIN TIME: CPT

## 2019-03-12 PROCEDURE — 84132 ASSAY OF SERUM POTASSIUM: CPT | Performed by: STUDENT IN AN ORGANIZED HEALTH CARE EDUCATION/TRAINING PROGRAM

## 2019-03-12 PROCEDURE — 36415 COLL VENOUS BLD VENIPUNCTURE: CPT | Performed by: STUDENT IN AN ORGANIZED HEALTH CARE EDUCATION/TRAINING PROGRAM

## 2019-03-12 PROCEDURE — 80053 COMPREHEN METABOLIC PANEL: CPT

## 2019-03-12 PROCEDURE — 82248 BILIRUBIN DIRECT: CPT

## 2019-03-12 PROCEDURE — 84133 ASSAY OF URINE POTASSIUM: CPT | Performed by: STUDENT IN AN ORGANIZED HEALTH CARE EDUCATION/TRAINING PROGRAM

## 2019-03-12 PROCEDURE — P9047 ALBUMIN (HUMAN), 25%, 50ML: HCPCS

## 2019-03-12 PROCEDURE — 83605 ASSAY OF LACTIC ACID: CPT | Performed by: INTERNAL MEDICINE

## 2019-03-12 PROCEDURE — 36415 COLL VENOUS BLD VENIPUNCTURE: CPT

## 2019-03-12 PROCEDURE — 25000128 H RX IP 250 OP 636

## 2019-03-12 RX ORDER — POTASSIUM CL/LIDO/0.9 % NACL 10MEQ/0.1L
10 INTRAVENOUS SOLUTION, PIGGYBACK (ML) INTRAVENOUS
Status: DISCONTINUED | OUTPATIENT
Start: 2019-03-12 | End: 2019-03-16

## 2019-03-12 RX ORDER — POTASSIUM CHLORIDE 7.45 MG/ML
10 INJECTION INTRAVENOUS
Status: DISCONTINUED | OUTPATIENT
Start: 2019-03-12 | End: 2019-03-16

## 2019-03-12 RX ORDER — POTASSIUM CHLORIDE 1.5 G/1.58G
20-40 POWDER, FOR SOLUTION ORAL
Status: DISCONTINUED | OUTPATIENT
Start: 2019-03-12 | End: 2019-03-16

## 2019-03-12 RX ORDER — POTASSIUM CHLORIDE 750 MG/1
20-40 TABLET, EXTENDED RELEASE ORAL
Status: DISCONTINUED | OUTPATIENT
Start: 2019-03-12 | End: 2019-03-16

## 2019-03-12 RX ORDER — POTASSIUM CHLORIDE 29.8 MG/ML
20 INJECTION INTRAVENOUS
Status: DISCONTINUED | OUTPATIENT
Start: 2019-03-12 | End: 2019-03-16

## 2019-03-12 RX ADMIN — POTASSIUM CHLORIDE 20 MEQ: 750 TABLET, EXTENDED RELEASE ORAL at 10:23

## 2019-03-12 RX ADMIN — Medication 100 MG: at 07:38

## 2019-03-12 RX ADMIN — RIFAXIMIN 550 MG: 550 TABLET ORAL at 19:53

## 2019-03-12 RX ADMIN — LACTULOSE 20 G: 10 POWDER, FOR SOLUTION ORAL at 15:37

## 2019-03-12 RX ADMIN — LACTULOSE 20 G: 10 POWDER, FOR SOLUTION ORAL at 19:54

## 2019-03-12 RX ADMIN — POTASSIUM CHLORIDE 20 MEQ: 750 TABLET, EXTENDED RELEASE ORAL at 22:39

## 2019-03-12 RX ADMIN — TRAZODONE HYDROCHLORIDE 50 MG: 50 TABLET ORAL at 22:39

## 2019-03-12 RX ADMIN — PANTOPRAZOLE SODIUM 40 MG: 40 TABLET, DELAYED RELEASE ORAL at 07:38

## 2019-03-12 RX ADMIN — RIFAXIMIN 550 MG: 550 TABLET ORAL at 07:38

## 2019-03-12 RX ADMIN — POTASSIUM CHLORIDE 40 MEQ: 750 TABLET, EXTENDED RELEASE ORAL at 07:55

## 2019-03-12 RX ADMIN — THERA TABS 1 TABLET: TAB at 07:38

## 2019-03-12 RX ADMIN — FOLIC ACID 1 MG: 1 TABLET ORAL at 07:38

## 2019-03-12 RX ADMIN — ALBUMIN HUMAN 75 G: 0.25 SOLUTION INTRAVENOUS at 07:37

## 2019-03-12 RX ADMIN — LACTULOSE 20 G: 10 POWDER, FOR SOLUTION ORAL at 07:38

## 2019-03-12 RX ADMIN — POTASSIUM CHLORIDE 40 MEQ: 750 TABLET, EXTENDED RELEASE ORAL at 20:26

## 2019-03-12 ASSESSMENT — ACTIVITIES OF DAILY LIVING (ADL)
ADLS_ACUITY_SCORE: 11

## 2019-03-12 ASSESSMENT — MIFFLIN-ST. JEOR: SCORE: 1607.38

## 2019-03-12 ASSESSMENT — PAIN DESCRIPTION - DESCRIPTORS: DESCRIPTORS: HEADACHE

## 2019-03-12 NOTE — PLAN OF CARE
"/62 (BP Location: Right arm)   Pulse 88   Temp 98.7  F (37.1  C) (Oral)   Resp 16   Ht 1.778 m (5' 10\")   Wt 75.8 kg (167 lb 1.6 oz)   SpO2 97%   BMI 23.98 kg/m  . Pt's vitals stable on RA. CIWA score: 0. Continues on telemetry & in NSR. Pt. denies pain or nausea. 2 PIVs saline locked. Pt. reported diarrhea mixed with urine x1 this shift. Urine needs to be sent for K+ & pt. reminded to save urine.   Pt. slept well between cares. Call light within reach. Continue to follow POC & notify team with changes.    "

## 2019-03-12 NOTE — PLAN OF CARE
RN [id number]:  Tmax 99.1; intermittently tachycardic low 100's. Tele monitored; sinus tachycardia noted. Otherwise vitally stable on RA. C/o slight HA; cold pack given. Denies nausea. Tolerating 2gm Na diet with fair appetite. Voiding adequate amounts. BM x3 - scheduled Lactulose administered. PIV x2 SL'd. Up with SBA. Will continue with plan of care.     Sepsis protocol triggered- Lactic 1.8  K+ recheck drawn- results currently pending.

## 2019-03-12 NOTE — PROGRESS NOTES
[facility], [place]    History and Physical - [facility] 2 Service        Date of Admission:  3/10/2019    Assessment & Plan   Ivan Bell is a 53 year old male with history of NAFLD cirrhosis s/p TIPS who presents with superimposed EtOH hepaitis w/o ascites in setting of relapsed alcohol use and AMBER.     TODAY  - Electrolyte replacement protocol ordered   - Nephrology consult for concern for possible renal tubular acidosis in setting of low potassium and metabolic acidosis (requiring significant amount of repletion ~330mEq upon admission)   - Chem dep as an outpatient   - Per GI, question of stenosis of TIPS can be followed up as outpatient    #Hypokalemia   Patient requiring ~330mEq of potassium upon admission to bring potassium from 2.1 --> 3.7. Urine potassium of 40, which is elevated. Urine potassium should not be elevated in setting of hypokalemia in the serum. Patient with metabolic acidosis (HCO3 of 16) today. Concern for renal tubular acidosis, likely type II.   - Electrolyte protocol   - Renal consult, appreciate recommendations      # NAFLD cirrhosis  # Alcohol hepatis, superimposed  # Alcohol use disorder  # MELD 32, worsening total bilirubin   US TIPS doesn't show thrombosis, but increased stent velocities concerning for stenosis. Per GI, not alarming but they are consulted and will provide recs. Relapsed drinking started ~ 2 months ago (had been 18 months sober), unclear amount of drinking but he said it was a total of 1/4 pint total that he drank over this period of time and his last drink was 3 weeks ago. No ascites on ultrasound, LFTs with alcohol hepatitis pattern with AST great than 3x ALT, direct hyperbilirubinemia, alk-phosphatemia. According to his wife, she thinks that he has been a bit more confused than baseline but nothing like florid hepatic encephalopathy which she has seen in the past. He restarted his lactulose 2 weeks ago. Shaguftaey's of 88 on admission, no  steroids at this time.      MELD-Na score: 32 at 3/12/2019  6:10 AM  MELD score: 32 at 3/12/2019  6:10 AM  Calculated from:  Serum Creatinine: 1.36 mg/dL at 3/12/2019  6:10 AM  Serum Sodium: 142 mmol/L (Rounded to 137 mmol/L) at 3/12/2019  6:10 AM  Total Bilirubin: 30.8 mg/dL at 3/12/2019  6:10 AM  INR(ratio): 2.31 at 3/12/2019  6:10 AM  Age: 53 years      Consults  - Chem dep consult, appreciate recs   - Hepatology consult, appreciate recs    Other:  - CIWA monitoring w/o prn benzo, nursing to page if scoring high    Treatment  - Scheduled lactulose  - Rifaxamin  - PRN lactulose (aka [place] protocol)  - Vitamin K    Follow up  - TIPS ?stenosis follow up as OP  - Outpatient chem dep  - No pharmacologic management for etoh cessation for now (maybe as OP)    # AMBER  Likely pre-renal in setting of poor po intake and high EtOH intake prior to admission. Level of decompensation would make HRS less likely but will albumin challenge.  - 75 g 25% albumin x 3 days    # Nicotine use disorder  Has 60 year pack history, quit smoking in 1994 but he still uses chewing tobacco. He is interested in quitting nicotine altogether and understands cancer risk associated with smokeless tobacco. Not interested in any nicotine replacement at this time.      Diet: 2g restricted sodium diet   Fluids: None  DVT Prophylaxis: VTE Prophylaxis contraindicated due to elevated INR  Blue Catheter: not present  Code Status: Full, discussed with patient and wife    Disposition Plan   Expected discharge: 1-2 days recommended to prior living arrangement once Resolution of AMBER, improved liver function.  Entered: Leonarda Cabrera MD 03/12/2019, 4:45 PM       The patient's care was discussed with the attending physician, Dr. Patel.     DO Constance Levine 2 Service  [facility], [place]  Pager: [phone]  Please see sticky note for cross cover  information  ______________________________________________________________________    Subjective  Patient states he feels better today. Tearful when he starts talking about his relapse with EtOH. He states that he wants to get sober again. Denies nausea, vomiting, chest pain, SOB, abdominal pain. Patient having loose bowel movements that are dark brown in color.       Physical Exam   Vital Signs: Temp: 99.1  F (37.3  C) Temp src: Oral BP: 140/80   Heart Rate: 103 Resp: 16 SpO2: 90 % O2 Device: None (Room air)    Weight: 168 lbs 14.4 oz    General Appearance: Jaundiced, alert and oriented x 3  Eyes: scleral icterus, PERRLA, EOMI  HEENT: MMM, mild pharyngeal erythema    Respiratory: CTAB  Cardiovascular: RRR, systolic murmur.  GI: Nontender, non-distended  Skin: Jaundice, no rashes on limited exam, no telangiectasias, spider angiomas  Neurologic: no asterixis, grossly neuro intact    Data   Data reviewed today: I reviewed all medications, new labs and imaging results over the last 24 hours. I personally reviewed the EKG tracing showing fairly flat t waves.    Recent Labs   Lab 03/12/19  0610 03/11/19  1926 03/11/19  1358 03/11/19  0614  03/10/19  0642   WBC 3.8*  --   --  3.7*  --  8.3   HGB 8.5*  --   --  9.2*  --  12.8*   *  --   --  101*  --  98   PLT 40*  --   --  42*  --  80*   INR 2.31*  --   --  2.25*  --  2.09*     --   --  141  --  135   POTASSIUM 3.0* 3.3* 3.7 2.5*   < > 2.1*   CHLORIDE 117*  --   --  113*  --  102   CO2 16*  --   --  20  --  20   BUN 13  --   --  14  --  18   CR 1.36*  --   --  1.44*  --  1.68*   ANIONGAP 9  --   --  8  --  14   JULISSA 8.4*  --   --  8.0*  --  8.5   GLC 85  --   --  82  --  113*   ALBUMIN 2.8*  --   --  2.3*  --  1.9*   PROTTOTAL 4.9*  --   --  4.6*  --  5.6*   BILITOTAL 30.8*  --   --  26.8*  --  28.6*   ALKPHOS 174*  --   --  214*  --  329*   ALT 26  --   --  28  --  41   AST 95*  --   --  115*  --  171*   LIPASE  --   --   --   --   --  252    < > = values in  this interval not displayed.

## 2019-03-12 NOTE — CONSULTS
[facility], [place]  Consult Note - Nephrology     Date of Admission:  3/10/2019  Consult Requested by: Dr. FER Styles  Reason for Consult: Hypokalemia     Assessment & Plan   Ivan Bell is a 53 year old male admitted on 3/10/2019. He has a PMH of cirrhosis s/p TIPS 6/2018 with prior complications of ascites, variceal bleed, esophageal varices s/p banding in 2018, and alcohol use disorder who came to the hospital with nausea, jaundice, and lightheadedness in the setting of recent alcohol relapse; he was found to to have AMBER with creatinine of 1.68 on admission and hypokalemia, with K of 2.1 on admission. EKG was significant for flat T waves. He received over 330 mEq of potassium, bringing K up from 2.1 to 3.7. Most recent K was 3.0.     Recommendations:   - Continue to check K on AM labs and replace per protocol  - If K is less than 3.0 after correction, then we would recommend starting spironolactone at 12.5 mg; if adding spironolactone, would also add oral sodium bicarbonate.    # Hypokalemia   # Type II renal tubular acidosis   K went from 2.1--> 2.4 -> 2.5--> 2.5--> 3.7--> 3.3--> 3.0. He received over 330 mEq of K to bring K from 2.1 to 3.7. Urine K is 40, combined with non-anion gap metabolic acidosis with alkaline urinary pH, suggests that kidney is inappropriately wasting potassium, consistent with proximal renal tubular acidosis (type II RTA). Proximal RTA more likely than distal RTA because serum bicarb is low (16 on 3/12/2019), likely because of decreased proximal reabsorption of HCO3 in the nephron.   BP is WNL, making primary hyperaldosteronism an unlikely cause of hypokalemia. Of note, it is possible that the patient also has a level of hyperaldosteronism on top of this RTA, but it is unlikely that this is the primary problem. The etiology of this patient's RTA is not exactly known, but it is possibly secondary to acute tubular necrosis from transient ischemia after  prerenal AMBER (decreased PO intake due to nausea, increased stool output after restarting lactulose). In other words, it appears that the patient's AMBER from prerenal etiology caused an ATN which then caused a proximal RTA leading to hypokalemia.   At this time, it appears as though the potassium is normalizing (currently 3.0, up from 2.1 on admission). This is likely because kidney function is also improving (creatinine is 1.36, down from 1.68 on admission).    - Replete potassium per protocol   - If potassium drops to < 3.0, okay to start 12.5 mg of spironolactone; would need to watch serum bicarbonate levels if adding spironolactone, likely would need to add sodium bicarbonate therapy    Other electrolytes:  Magnesium WNL (2.1 to 2.4). Corrected calcium WNL (9.4). Sodium (142) WNL.    # AMBER   Patient likely had a prerenal AMBER which converted to ATN from decreased PO intake due to nausea  and increased stool output after restarting lactulose. His creatinine on admission was 1.68, up from his baseline of about 1.1. AMBER improved with volume (received 75 g of albumin x 3 with improvement in creatinine). Creatinine downtrended to 1.68-->1.44-->1.36. Patient appears euvolemic on physical exam.   - Encourage PO intake  - Continue to monitor Cr on AM labs     # Non- gap metabolic acidosis   Etiology of non-gap metabolic acidosis is from Type II RTA, as discussed above. Urine pH is 7.5 and bicarb is low at 16 on AM labs. Kidney likely has decreased proximal reabsorption of bicarb secondary to tubular injury, likely from hypovolemia.  - Continue to monitor on AM labs     The patient's care was discussed with the Attending Physician, Dr. Blue.    Esther Kraft MD  [facility], [place]  Pager: [phone]  Please see sticky note for cross cover information  ______________________________________________________________________    Chief Complaint   Hypokalemia     History is obtained from the  patient, electronic health record and patient's significant other    History of Present Illness   Ivan Bell is a 53 year old male PMH of cirrhosis s/p TIPS 6/2018 with prior complications of ascites, variceal bleed, esophageal varices s/p banding in 2018, and alcohol use disorder who came to the hospital with nausea, jaundice, and lightheadedness in the setting of recent alcohol relapse. He reports that he was also having decreased appetite secondary to nausea. He also recently restarted taking lactulose for hepatic encephalopathy; his wife was concerned that he was slowing down mentally. As a result of resuming lactulose, he was having increased stool output. He did not have any emesis. No abdominal pain. No chest pain. No shortness of breath. No fevers. No chills. No cough or sputum production. No lower extremity swelling.     Review of Systems   ROS negative except as indicated above.     Past Medical History    I have reviewed this patient's medical history and updated it with pertinent information if needed.   Past Medical History:   Diagnosis Date     Ascites      Cirrhosis (H)      Esophageal varices (H)      Hepatitis      Hypertension      Left calcaneus fracture 1/9/2006 January 16, 2006: Fell 10 feet from ladder onto left foot on frozen ground on 1/9/06 at home.  Immediate pain and unable to walk- seen at [place] and diagnosed with calcaneus fracture     Portal vein thrombosis     left occlusion, partial main       Past Surgical History   I have reviewed this patient's surgical history and updated it with pertinent information if needed.  Past Surgical History:   Procedure Laterality Date     ANKLE SURGERY Left      COLONOSCOPY N/A 3/31/2016    Procedure: COLONOSCOPY;  Surgeon: Rhys Uriostegui MD;  Location:  GI     ESOPHAGOSCOPY, GASTROSCOPY, DUODENOSCOPY (EGD), COMBINED N/A 3/31/2016    Procedure: COMBINED ESOPHAGOSCOPY, GASTROSCOPY, DUODENOSCOPY (EGD);  Surgeon: Rhys Uriostegui  Luis Manuel PHILLIPS MD;  Location:  GI     ESOPHAGOSCOPY, GASTROSCOPY, DUODENOSCOPY (EGD), COMBINED N/A 3/9/2018    Procedure: COMBINED ESOPHAGOSCOPY, GASTROSCOPY, DUODENOSCOPY (EGD), BIOPSY SINGLE OR MULTIPLE;  EGD;  Surgeon: Gonzalo Wahl MD;  Location:  GI     KNEE SURGERY Left      KNEE SURGERY Right      SIGMOIDOSCOPY FLEXIBLE N/A 10/31/2017    Procedure: SIGMOIDOSCOPY FLEXIBLE;;  Surgeon: Armaan Adams MD;  Location:  GI     TIPS Procedure  2018       Social History   I have reviewed this patient's social history and updated it with pertinent information if needed.  Social History     Tobacco Use     Smoking status: Former Smoker     Types: Dip, chew, snus or snuff     Last attempt to quit: 1998     Years since quittin.5     Smokeless tobacco: Current User     Last attempt to quit: 10/24/2017     Tobacco comment: 1 tin per 10 days.   Substance Use Topics     Alcohol use: No     Alcohol/week: 10.5 oz     Types: 21 Cans of beer per week     Comment: last etoh 16, did have Odouls ~2017     Drug use: No       Family History   I have reviewed this patient's family history and updated it with pertinent information if needed.   Family History   Problem Relation Age of Onset     Family History Negative Mother      Family History Negative Father      Hypertension Father      Cerebrovascular Disease Father 87     Breast Cancer Maternal Grandmother      Rheumatoid Arthritis Daughter      Depression Daughter      Cancer - colorectal No family hx of      Prostate Cancer No family hx of      Liver Disease No family hx of        Medications   Medications Prior to Admission   Medication Sig Dispense Refill Last Dose     lactulose (CEPHULAC) 20 GM Packet Take 1 packet (20 g) by mouth 4 times daily Goal of 3-4 loose BMs per day, extra doses if concerned for confusion or not at goal, may hold for that day if reaches goal 300 each 0 3/9/2019 at Unknown time     Acetaminophen (TYLENOL PO) Take by  mouth every 4 hours as needed for mild pain or fever   Unknown at Unknown time     MULTIPLE VITAMINS PO Take 1 tablet by mouth daily   Unknown at Unknown time     potassium chloride SA (K-DUR/KLOR-CON M) 20 MEQ CR tablet Take 1 tablet (20 mEq) by mouth 2 times daily 120 tablet 3 Unknown at Unknown time     rifaximin (XIFAXAN) 550 MG TABS tablet Take 1 tablet (550 mg) by mouth 2 times daily 180 tablet 0 Unknown at Unknown time     sildenafil (REVATIO) 20 MG tablet Take 1-3  tablet (20 mg) by mouth daily as needed. 90 tablet 3 Unknown at Unknown time       Allergies   Allergies   Allergen Reactions     Benadryl [Diphenhydramine] Other (See Comments)     Delirium (visual and auditory hallucinations)     Oxycodone Other (See Comments)     Delirium and constipation       Physical Exam   Vital Signs: Temp: 99.5  F (37.5  C) Temp src: Oral BP: 129/63   Heart Rate: 98 Resp: 18 SpO2: 98 % O2 Device: None (Room air)    Weight: 168 lbs 14.4 oz    General Appearance: Laying in bed in NAD   Eyes: PERRL, MMM, unable to appreciate scleral icterus   HEENT: Normocephalic, atraumatic   Respiratory: CTAB, no respiratory distress, breathing comfortably on RA  Cardiovascular: Tachycardic   GI: Firm. Slightly distended. No rebound or tenderness. No masses appreciated.   Skin: Jaundiced   Musculoskeletal: No LE edema   Neurologic: No focal neurologic deficits   Psych: Normal affect. Responds to questions appropriately     Data   Results for orders placed or performed during the hospital encounter of 03/10/19 (from the past 24 hour(s))   Lactic acid level STAT for sepsis protocol   Result Value Ref Range    Lactate for Sepsis Protocol 3.0 (H) 0.7 - 2.0 mmol/L   Potassium   Result Value Ref Range    Potassium 3.3 (L) 3.4 - 5.3 mmol/L   Lactic acid whole blood   Result Value Ref Range    Lactic Acid 1.4 0.7 - 2.0 mmol/L   Comprehensive metabolic panel   Result Value Ref Range    Sodium 142 133 - 144 mmol/L    Potassium 3.0 (L) 3.4 - 5.3  mmol/L    Chloride 117 (H) 94 - 109 mmol/L    Carbon Dioxide 16 (L) 20 - 32 mmol/L    Anion Gap 9 3 - 14 mmol/L    Glucose 85 70 - 99 mg/dL    Urea Nitrogen 13 7 - 30 mg/dL    Creatinine 1.36 (H) 0.66 - 1.25 mg/dL    GFR Estimate 57 (L) >60 mL/min/[1.73_m2]    GFR Estimate If Black 66 >60 mL/min/[1.73_m2]    Calcium 8.4 (L) 8.5 - 10.1 mg/dL    Bilirubin Total 30.8 (HH) 0.2 - 1.3 mg/dL    Albumin 2.8 (L) 3.4 - 5.0 g/dL    Protein Total 4.9 (L) 6.8 - 8.8 g/dL    Alkaline Phosphatase 174 (H) 40 - 150 U/L    ALT 26 0 - 70 U/L    AST 95 (H) 0 - 45 U/L   CBC with platelets   Result Value Ref Range    WBC 3.8 (L) 4.0 - 11.0 10e9/L    RBC Count 2.42 (L) 4.4 - 5.9 10e12/L    Hemoglobin 8.5 (L) 13.3 - 17.7 g/dL    Hematocrit 25.2 (L) 40.0 - 53.0 %     (H) 78 - 100 fl    MCH 35.1 (H) 26.5 - 33.0 pg    MCHC 33.7 31.5 - 36.5 g/dL    RDW 18.7 (H) 10.0 - 15.0 %    Platelet Count 40 (LL) 150 - 450 10e9/L   INR   Result Value Ref Range    INR 2.31 (H) 0.86 - 1.14   Bilirubin direct   Result Value Ref Range    Bilirubin Direct 21.6 (H) 0.0 - 0.2 mg/dL   Potassium random urine   Result Value Ref Range    Potassium Urine mmol/L 40 mmol/L   Lactic acid level STAT for sepsis protocol   Result Value Ref Range    Lactate for Sepsis Protocol 1.8 0.7 - 2.0 mmol/L       Dennys RUIZ, saw this patient on 03/12/2019 with Dr. Ivey and agree with the findings and plan of care as documented in the note.  Agree that his hypokalemia and non-anion gap metabolic acidosis likely reflects proximal tubular damage and proximal renal tubular acidosis, proximal sodium bicarbonate leak with highly distal sodium excretion (conjugated with bicarbonate) leading to secondary hyperaldosteronism and hypokalemia.  His renal function appears to be improving, likely that proximal RTA should improve as well. If adding spironolactone, the latter may exacerbate acidosis while improving K, so should be combined with sodium bicarbonate.

## 2019-03-12 NOTE — PROGRESS NOTES
[facility] [place]    History and Physical - [facility] Service        Date of Admission:  3/10/2019    Assessment & Plan   Ivan Bell is a 53 year old male with history of NAFLD cirrhosis s/p TIPS who presents with superimposed etoh hepaitis w/o ascites in setting of relapsed alcohol use and additionally with AMBER     TODAY  Received over 330 mEq of potassium since admission to bring up potassium from 2.1 to 3.7, curb-sided nephrology who recommend urine potassium for now. Seen by psych today who don't recommend starting medication for etoh cessation management given his liver and kidney issues currently.  - Recheck K this mode  - Follow up urine K  - Per psych - outpatient chem dependency treatment and he may be candidate for pharmacologic treatment   - Per GI, question of stenosis of TIPS can be followed up as outpatient    # NAFLD cirrhosis  # Alcohol hepatis, superimposed  # Alcohol use disorder  US Tips doesn't show thrombosis, but increased stent velocities concerning for stenosis. Per GI, not alarming but they are consulting and will provide recs. Relapsed drinking started ~ 2 months ago (had been 18 months sober), unclear amount of drinking but he said it was a total of 1/4 pint total that he drank over this period of time and his last drink was 3 weeks ago. No ascites on ultrasound, LFTs with alcohol hepatitis pattern with AST great than 3x ALT, direct hyperbilirubinemia, alk-phosphatemia. According to his wife, she thinks that he has been a bit more confused than baseline but nothing like florid hepatic encephalopathy which she has seen in the past. He restarted his lactulose 2 weeks ago. Jamila's of 88    MELD-Na score: 31 at 3/11/2019  6:14 AM  MELD score: 31 at 3/11/2019  6:14 AM  Calculated from:  Serum Creatinine: 1.44 mg/dL at 3/11/2019  6:14 AM  Serum Sodium: 141 mmol/L (Rounded to 137 mmol/L) at 3/11/2019  6:14 AM  Total Bilirubin: 26.8 mg/dL at 3/11/2019  6:14  AM  INR(ratio): 2.25 at 3/11/2019  6:14 AM  Age: 53 years      Consults  - Chem dep consult, appreciate recs  - Hepatology consult, appreciate recs    Other:  - CIWA monitoring w/o prn benzo, nursing to page if scoring high  - Maddrey's (using IRN, not PT) of 88, f/u with GI re steroids    Treatment  - Scheduled lactulose  - Rifaxamin  - PRN lactulose (aka [place] protocol)  - Vit K    Follow up  - TIPS ?stenosis follow up as OP  - Outpatient chem dep  - No pharmacologic management for etoh cessation for now (maybe as OP)    # Hypokalemia  Hypokalemia to 2.1, receiving IV and oral replacement. EKG with flat T waves  - Aggressive replacement as above  - Recheck stat    # AMBER  Baseline SCr of 1.10 now elevated to 1.68. US tips w/o hydronephrosis. Level of decompensation would make HRS less likely but will albumin challenge. Likely non HRS prerenal in setting of decreased PO. UA with blood most likely from nephrolithiasis  - 75 g 25% albumin x 3 days    # Nicotine use disorder  Has 60 year pack history, quit smoking in 1994 but he still uses chewing tobacco. He is interested in quitting nicotine altogether and understands cancer risk associated with smokeless tobacco. Not interested in any nicotine replacement at this time.      Diet: Regular diet  Fluids: 125 ml/hr LR  DVT Prophylaxis: VTE Prophylaxis contraindicated due to elevated INR  Blue Catheter: not present  Code Status: Full, discussed with patient and wife    Disposition Plan   Expected discharge: 2 - 3 days, recommended to prior living arrangement once Resolution of AMBER, improved liver function.  Entered: Chace Bustamante MD 03/11/2019, 7:24 PM       The patient's care was discussed with the Attending Physician, Dr. Styles.    Chace Bustamante MD  [place] [id number] Service  [facility], [place]  Pager: [phone]  Please see sticky note for cross cover  information  ______________________________________________________________________    Subjective  NAEO, no f/c/n/v, no blood per rectum. CIWA not elevated       Physical Exam   Vital Signs: Temp: 98.6  F (37  C) Temp src: Oral BP: 129/59   Heart Rate: 97 Resp: 16 SpO2: 94 % O2 Device: None (Room air)    Weight: 167 lbs 1.6 oz    General Appearance: Jaundiced, alert and oriented x 3  Eyes: scleral icterus, PERRLA, EOMI  HEENT: MMM, mild pharyngeal erythema    Respiratory: CTAB  Cardiovascular: I/V systolic murmur. S1/S2 heard a/p/t windows, but diminished in mitral window  GI: Nontender, non-distended  Skin: Jaundice, no rashes on limited exam, no telangiectasias, spider angiomas  Neurologic: no asterixis, grossly neuro intact    Data   Data reviewed today: I reviewed all medications, new labs and imaging results over the last 24 hours. I personally reviewed the EKG tracing showing fairly flat t waves.    Recent Labs   Lab 03/11/19  1358 03/11/19  0614 03/10/19  1904  03/10/19  0642   WBC  --  3.7*  --   --  8.3   HGB  --  9.2*  --   --  12.8*   MCV  --  101*  --   --  98   PLT  --  42*  --   --  80*   INR  --  2.25*  --   --  2.09*   NA  --  141  --   --  135   POTASSIUM 3.7 2.5* 2.5*   < > 2.1*   CHLORIDE  --  113*  --   --  102   CO2  --  20  --   --  20   BUN  --  14  --   --  18   CR  --  1.44*  --   --  1.68*   ANIONGAP  --  8  --   --  14   JULISSA  --  8.0*  --   --  8.5   GLC  --  82  --   --  113*   ALBUMIN  --  2.3*  --   --  1.9*   PROTTOTAL  --  4.6*  --   --  5.6*   BILITOTAL  --  26.8*  --   --  28.6*   ALKPHOS  --  214*  --   --  329*   ALT  --  28  --   --  41   AST  --  115*  --   --  171*   LIPASE  --   --   --   --  252    < > = values in this interval not displayed.

## 2019-03-12 NOTE — PLAN OF CARE
"Vitals: Vitals stable, 99.5 temperature at noon.  Endocrine: n/a  Labs: Elevated LFT's and bilirubin, improvement in most, INR 2.31, creatinine decreased to 1.36. Potassium 3.0 this morning, 60 meq orally administered, recheck per lab at 1400.  Pain: Denies pain or nausea.  PRN's: n/a  Diet: 2 GM Sodium diet, fair appetite.  LDA: PIV X2, saline locked. 300cc IV Albumin administered.  GI: Distended abdomen, 1 loose BM per patient, is on Lactulose, hold for BM> 4. Patient flushed, unable to assess.  : Icteric urine, no blood noted.  Skin: Jaundiced.  Neuro: Flat affect, wife is concerned that he is still \"slow\" and did poorly on cognitive test yesterday.  Mobility: Up independently to the bathroom when not attached to IV. Continue to assess stability when up due to dizziness upon admission.  Education: n/a  Plan: Discharge home when medically stable with LFT's and potassium levels.    "

## 2019-03-12 NOTE — PLAN OF CARE
"Discharge Planner OT   Patient plan for discharge: Home with wife  Current status: indep bed mobility. SBA amb'd >500 feet no device and no LOB. Completed stairs 4 stepsx2 with mod I-indep, with and without use of rails. Tub transfer with mod I. Educated on EC/WS, fatigue management and prevention of deconditioning with sitting up in chair vs lying in bed. Pt with insight about need for OP chemical rehab and reports cognition is \"closer to normal\" during this session.   Barriers to return to prior living situation: Cognition, medical status  Recommendations for discharge: home with wife and OP chemical rehab to address alcohol abuse  Rationale for recommendations: pt with insight into his cognitive deficits and further need for chemical rehab. Pt making progress with functional ADL's.        Entered by: Susan Simmons 03/12/2019 11:11 AM       "

## 2019-03-13 ENCOUNTER — APPOINTMENT (OUTPATIENT)
Dept: OCCUPATIONAL THERAPY | Facility: CLINIC | Age: 54
End: 2019-03-13
Payer: COMMERCIAL

## 2019-03-13 ENCOUNTER — APPOINTMENT (OUTPATIENT)
Dept: ULTRASOUND IMAGING | Facility: CLINIC | Age: 54
End: 2019-03-13
Payer: COMMERCIAL

## 2019-03-13 LAB
ALBUMIN SERPL-MCNC: 3.5 G/DL (ref 3.4–5)
ALP SERPL-CCNC: 155 U/L (ref 40–150)
ALT SERPL W P-5'-P-CCNC: 22 U/L (ref 0–70)
ANION GAP SERPL CALCULATED.3IONS-SCNC: 14 MMOL/L (ref 3–14)
AST SERPL W P-5'-P-CCNC: 77 U/L (ref 0–45)
BILIRUB DIRECT SERPL-MCNC: 25.1 MG/DL (ref 0–0.2)
BILIRUB SERPL-MCNC: 35.5 MG/DL (ref 0.2–1.3)
BILIRUB SERPL-MCNC: 36.3 MG/DL (ref 0.2–1.3)
BUN SERPL-MCNC: 16 MG/DL (ref 7–30)
CALCIUM SERPL-MCNC: 8.8 MG/DL (ref 8.5–10.1)
CHLORIDE SERPL-SCNC: 112 MMOL/L (ref 94–109)
CO2 SERPL-SCNC: 14 MMOL/L (ref 20–32)
CREAT SERPL-MCNC: 1.41 MG/DL (ref 0.66–1.25)
ERYTHROCYTE [DISTWIDTH] IN BLOOD BY AUTOMATED COUNT: 18.6 % (ref 10–15)
GFR SERPL CREATININE-BSD FRML MDRD: 55 ML/MIN/{1.73_M2}
GLUCOSE SERPL-MCNC: 90 MG/DL (ref 70–99)
HCT VFR BLD AUTO: 25.1 % (ref 40–53)
HGB BLD-MCNC: 8.5 G/DL (ref 13.3–17.7)
INR PPP: 2.4 (ref 0.86–1.14)
MCH RBC QN AUTO: 35.9 PG (ref 26.5–33)
MCHC RBC AUTO-ENTMCNC: 33.9 G/DL (ref 31.5–36.5)
MCV RBC AUTO: 106 FL (ref 78–100)
PLATELET # BLD AUTO: 46 10E9/L (ref 150–450)
POTASSIUM SERPL-SCNC: 3.5 MMOL/L (ref 3.4–5.3)
POTASSIUM SERPL-SCNC: 3.5 MMOL/L (ref 3.4–5.3)
PROT SERPL-MCNC: 5.5 G/DL (ref 6.8–8.8)
RBC # BLD AUTO: 2.37 10E12/L (ref 4.4–5.9)
SODIUM SERPL-SCNC: 140 MMOL/L (ref 133–144)
WBC # BLD AUTO: 6.2 10E9/L (ref 4–11)

## 2019-03-13 PROCEDURE — 99233 SBSQ HOSP IP/OBS HIGH 50: CPT | Mod: GC | Performed by: STUDENT IN AN ORGANIZED HEALTH CARE EDUCATION/TRAINING PROGRAM

## 2019-03-13 PROCEDURE — 80053 COMPREHEN METABOLIC PANEL: CPT

## 2019-03-13 PROCEDURE — 25000132 ZZH RX MED GY IP 250 OP 250 PS 637: Performed by: STUDENT IN AN ORGANIZED HEALTH CARE EDUCATION/TRAINING PROGRAM

## 2019-03-13 PROCEDURE — 85027 COMPLETE CBC AUTOMATED: CPT

## 2019-03-13 PROCEDURE — 85610 PROTHROMBIN TIME: CPT

## 2019-03-13 PROCEDURE — 84132 ASSAY OF SERUM POTASSIUM: CPT | Performed by: INTERNAL MEDICINE

## 2019-03-13 PROCEDURE — 84132 ASSAY OF SERUM POTASSIUM: CPT | Performed by: STUDENT IN AN ORGANIZED HEALTH CARE EDUCATION/TRAINING PROGRAM

## 2019-03-13 PROCEDURE — 25000132 ZZH RX MED GY IP 250 OP 250 PS 637

## 2019-03-13 PROCEDURE — 36415 COLL VENOUS BLD VENIPUNCTURE: CPT

## 2019-03-13 PROCEDURE — 36415 COLL VENOUS BLD VENIPUNCTURE: CPT | Performed by: INTERNAL MEDICINE

## 2019-03-13 PROCEDURE — 97535 SELF CARE MNGMENT TRAINING: CPT | Mod: GO

## 2019-03-13 PROCEDURE — 76700 US EXAM ABDOM COMPLETE: CPT

## 2019-03-13 PROCEDURE — 12000012 ZZH R&B MS OVERFLOW UMMC

## 2019-03-13 PROCEDURE — 82247 BILIRUBIN TOTAL: CPT | Performed by: INTERNAL MEDICINE

## 2019-03-13 PROCEDURE — 97530 THERAPEUTIC ACTIVITIES: CPT | Mod: GO

## 2019-03-13 PROCEDURE — 82248 BILIRUBIN DIRECT: CPT | Performed by: INTERNAL MEDICINE

## 2019-03-13 RX ORDER — POTASSIUM CHLORIDE 750 MG/1
40 TABLET, EXTENDED RELEASE ORAL 2 TIMES DAILY
Status: DISCONTINUED | OUTPATIENT
Start: 2019-03-13 | End: 2019-03-14

## 2019-03-13 RX ORDER — POTASSIUM CHLORIDE 1.5 G/1.58G
40 POWDER, FOR SOLUTION ORAL 2 TIMES DAILY
Status: DISCONTINUED | OUTPATIENT
Start: 2019-03-13 | End: 2019-03-13

## 2019-03-13 RX ORDER — POTASSIUM CHLORIDE 1.5 G/1.58G
20 POWDER, FOR SOLUTION ORAL ONCE
Status: COMPLETED | OUTPATIENT
Start: 2019-03-13 | End: 2019-03-13

## 2019-03-13 RX ORDER — PREDNISOLONE 5 MG/1
40 TABLET ORAL DAILY
Status: DISCONTINUED | OUTPATIENT
Start: 2019-03-13 | End: 2019-03-19

## 2019-03-13 RX ADMIN — POTASSIUM CHLORIDE 20 MEQ: 750 TABLET, EXTENDED RELEASE ORAL at 04:00

## 2019-03-13 RX ADMIN — FOLIC ACID 1 MG: 1 TABLET ORAL at 07:50

## 2019-03-13 RX ADMIN — Medication 100 MG: at 07:50

## 2019-03-13 RX ADMIN — POTASSIUM CHLORIDE 40 MEQ: 750 TABLET, EXTENDED RELEASE ORAL at 19:50

## 2019-03-13 RX ADMIN — PREDNISOLONE 40 MG: 5 TABLET ORAL at 17:18

## 2019-03-13 RX ADMIN — THERA TABS 1 TABLET: TAB at 07:50

## 2019-03-13 RX ADMIN — PANTOPRAZOLE SODIUM 40 MG: 40 TABLET, DELAYED RELEASE ORAL at 07:50

## 2019-03-13 RX ADMIN — RIFAXIMIN 550 MG: 550 TABLET ORAL at 19:50

## 2019-03-13 RX ADMIN — LACTULOSE 20 G: 10 POWDER, FOR SOLUTION ORAL at 16:42

## 2019-03-13 RX ADMIN — RIFAXIMIN 550 MG: 550 TABLET ORAL at 07:50

## 2019-03-13 RX ADMIN — LACTULOSE 20 G: 10 POWDER, FOR SOLUTION ORAL at 19:50

## 2019-03-13 RX ADMIN — POTASSIUM CHLORIDE 20 MEQ: 750 TABLET, EXTENDED RELEASE ORAL at 07:50

## 2019-03-13 RX ADMIN — POTASSIUM CHLORIDE 20 MEQ: 1.5 POWDER, FOR SOLUTION ORAL at 09:50

## 2019-03-13 RX ADMIN — TRAZODONE HYDROCHLORIDE 50 MG: 50 TABLET ORAL at 22:32

## 2019-03-13 ASSESSMENT — ACTIVITIES OF DAILY LIVING (ADL)
ADLS_ACUITY_SCORE: 11
ADLS_ACUITY_SCORE: 11
ADLS_ACUITY_SCORE: 12
ADLS_ACUITY_SCORE: 11

## 2019-03-13 ASSESSMENT — MIFFLIN-ST. JEOR: SCORE: 1601.48

## 2019-03-13 NOTE — PROGRESS NOTES
"[facility]    Hepatology Follow-up    CC: Dizziness                  AMS    Dx: Decompensated alcoholic cirrhosis, grade 1 HE                  AH              AMBER  24 hour events:  Potasium normalizes, slightly worsened renal function and T bilirubin.     Subjective:  Alert and oriented, denies abd pain, distension, N/V  Patient denies fevers, sweats or chills.    Medications  Current Facility-Administered Medications   Medication Dose Route Frequency     folic acid  1 mg Oral Daily     lactulose  20 g Oral 4x Daily     multivitamin, therapeutic  1 tablet Oral Daily     pantoprazole  40 mg Oral QAM AC     potassium chloride  40 mEq Oral BID     prednisoLONE  40 mg Oral Daily     promethazine  12.5 mg Intravenous Once     rifaximin  550 mg Oral BID     sodium chloride (PF)  3 mL Intracatheter Q8H     traZODone  50 mg Oral At Bedtime     vitamin B1  100 mg Oral Daily       Review of systems  A 10-point review of systems was negative.    Examination  /79 (BP Location: Left arm)   Pulse 88   Temp 98.3  F (36.8  C) (Oral)   Resp 20   Ht 1.778 m (5' 10\")   Wt 76 kg (167 lb 9.6 oz)   SpO2 92%   BMI 24.05 kg/m      Intake/Output Summary (Last 24 hours) at 3/11/2019 1050  Last data filed at 3/11/2019 1038  Gross per 24 hour   Intake 500 ml   Output 901 ml   Net -401 ml       Gen- NAD, A+Ox3, icteric  CVS- RRR  RS- CTA  Abd- Soft, NT, ND, + BS  Extr-No edema  Neuro- No asterexis  Skin- Jaundice  Psych- normal mood  Lym- no LAD    Laboratory  Lab Results   Component Value Date     03/11/2019    POTASSIUM 2.5 03/11/2019    CHLORIDE 113 03/11/2019    CO2 20 03/11/2019    BUN 14 03/11/2019    CR 1.44 03/11/2019       Lab Results   Component Value Date    BILITOTAL 26.8 03/11/2019    ALT 28 03/11/2019     03/11/2019    ALKPHOS 214 03/11/2019       Lab Results   Component Value Date    WBC 3.7 03/11/2019    HGB 9.2 03/11/2019     03/11/2019    PLT 42 03/11/2019       Lab " Results   Component Value Date    INR 2.25 03/11/2019     MELD-Na score: 33 at 3/13/2019  6:00 AM  MELD score: 33 at 3/13/2019  6:00 AM  Calculated from:  Serum Creatinine: 1.41 mg/dL at 3/13/2019  6:00 AM  Serum Sodium: 140 mmol/L (Rounded to 137 mmol/L) at 3/13/2019  6:00 AM  Total Bilirubin: 36.3 mg/dL at 3/13/2019  6:00 AM  INR(ratio): 2.4 at 3/13/2019  6:00 AM  Age: 53 years    Assessment  53 year old male with a history of alcohol misuse, decompensated alcoholic cirrhosis, history of ascites (s/p TIPS 5/14/18, 6/6/18), HE, EV and variceal bleed (Oct 2017) who presents for evaluation of dizziness, nausea and recent mental changes in setting of relapsed alcohol use.  Sober for 18 months, relapsed drinking in January 2019 Per patient's wife, noted to be more confused over last few days. Presentation suggestive of grade 1 HE, with elevated transaminases suggestive of AH and intrahepatic cholestasis, no biliary dilation noted on imaging.  Last EGD Apr 2018- medium EV with bandingx4, mild portal hypertensive gastropathy. US w/ doppler 3/10 with mild ascites and significant increase in TIPS stent velocities concerning for stenosis.  Continues to have worsening hyperbilirubinemia and AMBER.    Recommendations  - Start PO Prednisolone 40 mg daily and continue for 28 days if showing response to tratment. Stop therapy if fails to show signs of improvement after one week (no improvement in bilirubin or DF). This can be addressed/monitored as outpatient with repeat LFTs/PT/INR  - Continue neuro check q4H  - Continue PO Lactulose, titrate to 3-4 BMs per day, avoid over-titration of lactulose to prevent dehydration/FWD  - Continue rifaximin 550 mg BID  - Thiamine and folate  - Daily MELD labs  - PO PPI once daily  - Low salt diet (<2 g/day)  - Protein-calorie supplementation  - GI team will continue to follow with you, please call for questions/concerns     Patient care plan discussed with Dr. Be, GI staff  physician.      Ulisses Briggs MD  Hepatology  #[id number]  ATTENDING NOTE, GASTROENTEROLOGY/HEPATOLOGY    I saw and discussed this patient with the fellow and participated in the decision making. I agree with the fellow's note. Rianna Be MD

## 2019-03-13 NOTE — PROGRESS NOTES
Nephrology Progress Note  March 13, 2019      Ivan Bell MRN:[id number] YOB: 1965  Date of Admission:3/10/2019  Primary care provider: Rosette Wills  Requesting physician: Michael Patel,*    ASSESSMENT AND RECOMMENDATIONS:   Ivan Bell is a 53 year old male admitted on 3/10/2019. He has a PMH of cirrhosis s/p TIPS 6/2018 with prior complications of ascites, variceal bleed, esophageal varices s/p banding in 2018, and alcohol use disorder who came to the hospital with nausea, jaundice, and lightheadedness in the setting of recent alcohol relapse; he was found to to have AMBER with creatinine of 1.68 on admission and hypokalemia with K of 2.1 on admission. EKG was significant for flat T waves. He received over 330 mEq of potassium, bringing K up from 2.1 to 3.7. Most recent K was 3.5. Also showing non-gap metabolic acidosis with hypocapnia, suggestive of type II RTA.      Recommendations:   - Check ABG or VBG for serum pH; depending on pH, could consider giving bicarb.  - Agree with 1.5 to 2 L water restriction   - Standing daily weights   - Continue to check K on AM labs and replace per protocol     # Hypokalemia   # Type II renal tubular acidosis   K went from 2.1--> 2.4 -> 2.5--> 2.5--> 3.7--> 3.3--> 3.0. He received over 330 mEq of K to bring K from 2.1 to 3.7. Urine K is 40, combined with non-anion gap metabolic acidosis with alkaline urinary pH, suggests that kidney is inappropriately wasting potassium, consistent with proximal renal tubular acidosis (type II RTA). Proximal RTA more likely than distal RTA because serum bicarb is low (16 on 3/12/2019), likely because of decreased proximal reabsorption of HCO3 in the nephron.   BP is WNL, making primary hyperaldosteronism an unlikely cause of hypokalemia. Of note, it is possible that the patient also has a level of hyperaldosteronism on top of this RTA, but it is unlikely that this is the primary problem. The etiology of this  patient's RTA is not exactly known, but it is possibly secondary to acute tubular necrosis from transient ischemia after prerenal AMBER (decreased PO intake due to nausea, increased stool output after restarting lactulose). In other words, it appears that the patient's AMBER from prerenal etiology caused an ATN which then caused a proximal RTA leading to hypokalemia.   At this time, it appears as though the potassium is normalizing (currently 3.5, up from 2.1 on admission). He received 120 mEq of K on 3/12/2019. Expect hypokalemia to normalize as renal function improves.   - No need for spironolactone at this time    - Replete potassium per protocol      Other electrolytes:  Magnesium WNL. Corrected calcium WNL. Sodium WNL.     # AMBER   Patient likely had a prerenal AMBER which converted to ATN from decreased PO intake due to nausea  and increased stool output after restarting lactulose. His creatinine on admission was 1.68, up from his baseline of about 1.1. AMBER improved with volume (received 75 g of albumin x 3 with improvement in creatinine). Creatinine downtrended to 1.68-->1.44-->1.36. Slightly uptrending creatinine on 3/13/2019 to 1.41.   - Encourage PO intake, but agree with 2 L water restriction from primary team.   - Standing daily weights   - Continue to monitor Cr on AM labs      # Non- gap metabolic acidosis   Etiology of non-gap metabolic acidosis is from Type II RTA, as discussed above. Urine pH is 7.5 and bicarb is low at 14 on AM labs (down from 16). Kidney likely has decreased proximal reabsorption of bicarb secondary to tubular injury, likely from hypovolemia. Patient likely has proximal sodium bicarbonate leak with highly distal sodium excretion (conjugated with bicarbonate) leading to secondary hyperaldosteronism and hypokalemia  - Recommend getting ABG or VBG for serum pH   - Need to watch serum bicarbonate levels; may need to add sodium bicarbonate therapy, but this is risky because it could cause  intracellular shifting of K, which would further lower potassium levels in the blood.     Recommendations were communicated to primary team.     Seen and discussed with Dr. Su Kraft MD   Internal Medicine, PGY-1  [phone]      SUBJECTIVE:  Nursing notes reviewed. No acute events overnight. Patient denies any nausea, vomiting, chest pain, constipation, or abdominal pain. Denies confusion, although wife thinks that he might be more confused than his baseline. Also denying shortness of breath.     PAST MEDICAL HISTORY:  Reviewed with patient on 03/13/2019     Past Medical History:   Diagnosis Date     Ascites      Cirrhosis (H)      Esophageal varices (H)      Hepatitis      Hypertension      Left calcaneus fracture 1/9/2006 January 16, 2006: Fell 10 feet from ladder onto left foot on frozen ground on 1/9/06 at home.  Immediate pain and unable to walk- seen at [place] and diagnosed with calcaneus fracture     Portal vein thrombosis     left occlusion, partial main       Past Surgical History:   Procedure Laterality Date     ANKLE SURGERY Left      COLONOSCOPY N/A 3/31/2016    Procedure: COLONOSCOPY;  Surgeon: Rhys Uriostegui MD;  Location:  GI     ESOPHAGOSCOPY, GASTROSCOPY, DUODENOSCOPY (EGD), COMBINED N/A 3/31/2016    Procedure: COMBINED ESOPHAGOSCOPY, GASTROSCOPY, DUODENOSCOPY (EGD);  Surgeon: Rhys Uriostegui MD;  Location:  GI     ESOPHAGOSCOPY, GASTROSCOPY, DUODENOSCOPY (EGD), COMBINED N/A 3/9/2018    Procedure: COMBINED ESOPHAGOSCOPY, GASTROSCOPY, DUODENOSCOPY (EGD), BIOPSY SINGLE OR MULTIPLE;  EGD;  Surgeon: Gonzalo Wahl MD;  Location:  GI     KNEE SURGERY Left      KNEE SURGERY Right      SIGMOIDOSCOPY FLEXIBLE N/A 10/31/2017    Procedure: SIGMOIDOSCOPY FLEXIBLE;;  Surgeon: Armaan Adams MD;  Location:  GI     TIPS Procedure  06/06/2018        MEDICATIONS:  PTA Meds  Prior to Admission medications    Medication Sig Last Dose Taking? Auth  Provider   lactulose (CEPHULAC) 20 GM Packet Take 1 packet (20 g) by mouth 4 times daily Goal of 3-4 loose BMs per day, extra doses if concerned for confusion or not at goal, may hold for that day if reaches goal 3/9/2019 at Unknown time Yes Antwon Light MD   Acetaminophen (TYLENOL PO) Take by mouth every 4 hours as needed for mild pain or fever Unknown at Unknown time  Reported, Patient   MULTIPLE VITAMINS PO Take 1 tablet by mouth daily Unknown at Unknown time  Reported, Patient   potassium chloride SA (K-DUR/KLOR-CON M) 20 MEQ CR tablet Take 1 tablet (20 mEq) by mouth 2 times daily Unknown at Unknown time  Gonzalo Wahl MD   rifaximin (XIFAXAN) 550 MG TABS tablet Take 1 tablet (550 mg) by mouth 2 times daily Unknown at Unknown time  Antwon Light MD   sildenafil (REVATIO) 20 MG tablet Take 1-3  tablet (20 mg) by mouth daily as needed. Unknown at Unknown time  Rosette Wills MD      Current Meds    folic acid  1 mg Oral Daily     lactulose  20 g Oral 4x Daily     multivitamin, therapeutic  1 tablet Oral Daily     pantoprazole  40 mg Oral QAM AC     potassium chloride  40 mEq Oral BID     prednisoLONE  40 mg Oral Daily     promethazine  12.5 mg Intravenous Once     rifaximin  550 mg Oral BID     sodium chloride (PF)  3 mL Intracatheter Q8H     traZODone  50 mg Oral At Bedtime     vitamin B1  100 mg Oral Daily     Infusion Meds    - MEDICATION INSTRUCTIONS -         ALLERGIES:    Allergies   Allergen Reactions     Benadryl [Diphenhydramine] Other (See Comments)     Delirium (visual and auditory hallucinations)     Oxycodone Other (See Comments)     Delirium and constipation       REVIEW OF SYSTEMS:  A comprehensive of systems was negative except as noted above.    SOCIAL HISTORY:   Social History     Socioeconomic History     Marital status:      Spouse name: Not on file     Number of children: Not on file     Years of education: Not on file     Highest education level: Not on file    Occupational History     Not on file   Social Needs     Financial resource strain: Not on file     Food insecurity:     Worry: Not on file     Inability: Not on file     Transportation needs:     Medical: Not on file     Non-medical: Not on file   Tobacco Use     Smoking status: Former Smoker     Types: Dip, chew, snus or snuff     Last attempt to quit: 1998     Years since quittin.5     Smokeless tobacco: Current User     Last attempt to quit: 10/24/2017     Tobacco comment: 1 tin per 10 days.   Substance and Sexual Activity     Alcohol use: No     Alcohol/week: 10.5 oz     Types: 21 Cans of beer per week     Comment: last etoh 16, did have Odouls ~2017     Drug use: No     Sexual activity: Not on file   Lifestyle     Physical activity:     Days per week: Not on file     Minutes per session: Not on file     Stress: Not on file   Relationships     Social connections:     Talks on phone: Not on file     Gets together: Not on file     Attends [employer] service: Not on file     Active member of club or organization: Not on file     Attends meetings of clubs or organizations: Not on file     Relationship status: Not on file     Intimate partner violence:     Fear of current or ex partner: Not on file     Emotionally abused: Not on file     Physically abused: Not on file     Forced sexual activity: Not on file   Other Topics Concern     Parent/sibling w/ CABG, MI or angioplasty before 65F 55M? Not Asked   Social History Narrative     Not on file       FAMILY MEDICAL HISTORY:   Family History   Problem Relation Age of Onset     Family History Negative Mother      Family History Negative Father      Hypertension Father      Cerebrovascular Disease Father 87     Breast Cancer Maternal Grandmother      Rheumatoid Arthritis Daughter      Depression Daughter      Cancer - colorectal No family hx of      Prostate Cancer No family hx of      Liver Disease No family hx of          PHYSICAL EXAM:   Temp  Avg:  "98.9  F (37.2  C)  Min: 97.9  F (36.6  C)  Max: 100  F (37.8  C)      Pulse  Av  Min: 86  Max: 96 Resp  Av  Min: 14  Max: 21  SpO2  Av.9 %  Min: 90 %  Max: 100 %       /79 (BP Location: Left arm)   Pulse 88   Temp 98.3  F (36.8  C) (Oral)   Resp 20   Ht 1.778 m (5' 10\")   Wt 76 kg (167 lb 9.6 oz)   SpO2 92%   BMI 24.05 kg/m     Date 19 0700 - 19 0659   Shift 7819-1753 9720-7029 7244-2561 24 Hour Total   INTAKE   P.O. 320   320   Shift Total(mL/kg) 320(4.21)   320(4.21)   OUTPUT   Urine 200   200   Shift Total(mL/kg) 200(2.63)   200(2.63)   Weight (kg) 76.02 76.02 76.02 76.02      Admit Weight: 74.8 kg (165 lb)     GENERAL APPEARANCE: Laying in bed in NAD   EYES: Positive for faint scleral icterus, pupils equal  Pulmonary: Tachypneic with some abdominal movement with inspiration. Faint crackles auscultated. Symmetric chest rise.   CV: Regular rhythm, normal rate  GI: Distended, soft, nontender, normal bowel sounds  MSK: No LE edema. Distal pulses intact bilaterally.   : No kenney catheter in place   SKIN: Positive for jaundice   NEURO: Alert, but not responding to questions appropriately. Appears confused.     LABS:   CMP  Recent Labs   Lab 19  0600 19  0302 19  1637 19  0610  19  0614  03/10/19  0642     --   --  142  --  141  --  135   POTASSIUM 3.5 3.5 3.3* 3.0*   < > 2.5*   < > 2.1*   CHLORIDE 112*  --   --  117*  --  113*  --  102   CO2 14*  --   --  16*  --  20  --  20   ANIONGAP 14  --   --  9  --  8  --  14   GLC 90  --   --  85  --  82  --  113*   BUN 16  --   --  13  --  14  --  18   CR 1.41*  --   --  1.36*  --  1.44*  --  1.68*   GFRESTIMATED 55*  --   --  57*  --  54*  --  45*   GFRESTBLACK 64  --   --  66  --  63  --  52*   JULISSA 8.8  --   --  8.4*  --  8.0*  --  8.5   MAG  --   --   --   --   --  2.4*  --  2.1   PROTTOTAL 5.5*  --   --  4.9*  --  4.6*  --  5.6*   ALBUMIN 3.5  --   --  2.8*  --  2.3*  --  1.9*   BILITOTAL 36.3* " 35.5*  --  30.8*  --  26.8*  --  28.6*   ALKPHOS 155*  --   --  174*  --  214*  --  329*   AST 77*  --   --  95*  --  115*  --  171*   ALT 22  --   --  26  --  28  --  41    < > = values in this interval not displayed.     CBC  Recent Labs   Lab 03/13/19  0600 03/12/19  0610 03/11/19  0614 03/10/19  0642   HGB 8.5* 8.5* 9.2* 12.8*   WBC 6.2 3.8* 3.7* 8.3   RBC 2.37* 2.42* 2.66* 3.70*   HCT 25.1* 25.2* 26.8* 36.2*   * 104* 101* 98   MCH 35.9* 35.1* 34.6* 34.6*   MCHC 33.9 33.7 34.3 35.4   RDW 18.6* 18.7* 18.9* 18.6*   PLT 46* 40* 42* 80*     INR  Recent Labs   Lab 03/13/19  0600 03/12/19  0610 03/11/19  0614 03/10/19  0642   INR 2.40* 2.31* 2.25* 2.09*     ABGNo lab results found in last 7 days.   URINE STUDIES  Recent Labs   Lab Test 03/11/19  1040 06/09/18 2003 03/09/16  0017 02/28/16  0425  12/27/13  0918 12/18/12  1642 03/30/11  0830   COLOR Dark Yellow Yellow Dark Yellow Dark Yellow   < > Yellow Yellow Yellow   APPEARANCE Clear Clear Slightly Cloudy Slightly Cloudy   < > Clear Clear Clear   URINEGLC Negative Negative Negative Negative   < > Negative Negative Negative   URINEBILI Large* Negative Large   This is an unconfirmed screening test result. A positive result may be false.  * Large   This is an unconfirmed screening test result. A positive result may be false.  *   < > Negative Negative Small*   URINEKETONE Negative Negative Negative Negative   < > Negative 15* Negative   SG 1.007 1.008 1.010 1.003   < > 1.020 1.015 1.020   UBLD Moderate* Negative Negative Negative   < > Negative Trace* Negative   URINEPH 7.5* 7.5* 5.5 6.0   < > 6.0 6.0 6.0   PROTEIN Negative Negative Negative Negative   < > Negative Negative Trace*   UROBILINOGEN  --   --   --   --   --  1.0 0.2 2.0*   NITRITE Negative Negative Negative Negative   < > Negative Negative Negative CORRECTED ON 03/30 AT 0928: PREVIOUSLY REPORTED AS Positive   LEUKEST Small* Small* Negative Negative   < > Trace* Negative Negative   RBCU 164* 1 0 <1   <  > O - 2 O - 2 O - 2   WBCU 13* 13* <1 6*   < > O - 2 O - 2 O - 2    < > = values in this interval not displayed.     No lab results found.  PTH  No lab results found.  IRON STUDIES  Recent Labs   Lab Test 02/25/16  1630 02/13/11  0709   IRON 51 147   * 229*   IRONSAT 28 64*   JOAQUIN 1,306* 1391*     MELD-Na score: 33 at 3/13/2019  6:00 AM  MELD score: 33 at 3/13/2019  6:00 AM  Calculated from:  Serum Creatinine: 1.41 mg/dL at 3/13/2019  6:00 AM  Serum Sodium: 140 mmol/L (Rounded to 137 mmol/L) at 3/13/2019  6:00 AM  Total Bilirubin: 36.3 mg/dL at 3/13/2019  6:00 AM  INR(ratio): 2.4 at 3/13/2019  6:00 AM  Age: 53 years    IMAGING:  Reviewed.       I, Dennys Blue, saw this patient on 03/13/2019 with Dr. Kraft and agree with the findings and plan of care as documented in the note.

## 2019-03-13 NOTE — PROGRESS NOTES
[facility], [place]    History and Physical - Mardarcy 2 Service        Date of Admission:  3/10/2019    Assessment & Plan   Ivan Bell is a 53 year old male with history of NAFLD cirrhosis s/p TIPS who presents with superimposed EtOH hepaitis w/o ascites in setting of relapsed alcohol use and AMBER. Patient more overloaded on physical exam today, tachypneic at times.  Improving creatinine but worsening total bilirubin.     TODAY  - 40mg Prednisolone started for alcohol hepatitis   - Abdominal ultrasound ordered  - 40mEq potassium BID with potassium replacement protocol     # NAFLD cirrhosis  # Alcohol hepatis, superimposed  # Alcohol use disorder  # MELD 33, worsening total bilirubin   Patient sober for 18 months but then started drinking again in January. Presented to the hospital with laboratory values concerning for EtOH hepatitis. Patient's Maddrey score was 88 but no steroids were started. Abdominal ultrasound on admission showed no evidence of thrombosis or ascites but did show increased stent velocities concerning for stenosis. Patient slightly more confused today. Also appears more volume overloaded on physical examination. Tachypneic at times. Total bilirubin continues to rise.   - Hepatology following, appreciate recs  - Rediscussed patient's case with hepatology today who is not recommending 40mg Prednisolone be started for EtOH hepatitis   - Abdominal ultrasound ordered today to look for ascites as patient more hypervolemic   - Lactulose QID   - Daily MELD labs   - Daily weights, 2L fluid restriction, 2g sodium restricted diet  - PPI once daily       MELD-Na score: 33 at 3/13/2019  6:00 AM  MELD score: 33 at 3/13/2019  6:00 AM  Calculated from:  Serum Creatinine: 1.41 mg/dL at 3/13/2019  6:00 AM  Serum Sodium: 140 mmol/L (Rounded to 137 mmol/L) at 3/13/2019  6:00 AM  Total Bilirubin: 36.3 mg/dL at 3/13/2019  6:00 AM  INR(ratio): 2.4 at 3/13/2019  6:00 AM  Age: 53  years    Consults  - Chem dep consult, appreciate recs   - Hepatology consult, appreciate recs    Other:  - CIWA monitoring w/o prn benzo    Treatment  - Scheduled lactulose  - Rifaxamin    Follow up  - TIPS ?stenosis follow up as OP  - Outpatient chem dep      #Hypokalemia  #RTA Type II    Patient requiring ~330mEq of potassium upon admission to bring potassium from 2.1 --> 3.7. Urine potassium of 40. Thought to be secondary to RTA Type II in setting of pre-renal injury resulting in AMBER. Decreased amount of potassium repletion (120mEq) over the last 24 hours. Potassium has remained stable.   - Renal consult, appreciate recs  - Since potassium staying stable, no need to start Spironolactone at this time   - 40mEq potassium BID   - Potassium repletion protocol     # AMBER  Likely pre-renal in setting of poor po intake and high EtOH intake prior to admission. Responded well to albumin challenge. Creatinine increased slightly this AM.   - Continue to monitor     # Nicotine use disorder  Has 60 year pack history, quit smoking in 1994 but he still uses chewing tobacco. He is interested in quitting nicotine altogether and understands cancer risk associated with smokeless tobacco. Not interested in any nicotine replacement at this time.      Diet: 2g restricted sodium diet, 2L fluid restricted diet   Fluids: None  DVT Prophylaxis: VTE Prophylaxis contraindicated due to elevated INR  Blue Catheter: not present  Code Status: Full, discussed with patient and wife    Disposition Plan   Expected discharge: 1-2 days recommended to prior living arrangement once Resolution of AMBER, improved liver function.  Entered: Leonarda Cabrera MD 03/13/2019, 6:02 PM       The patient's care was discussed with the attending physician, Dr. Patel.     DO Constance Levine 2 Service  [facility]  Pager: [phone]  Please see sticky note for cross cover  information  ______________________________________________________________________    Subjective  No acute events overnight. Patient states he feels well today. Wife is at bedside. Patient states that he has been having multiple loose bowel movements. Denies headache, dizziness, lightheadedness, chest pain, SOB, abdominal pain, dysuria.       Physical Exam   Vital Signs: Temp: 99.4  F (37.4  C) Temp src: Oral BP: 148/74   Heart Rate: 105 Resp: 20 SpO2: 90 % O2 Device: None (Room air)    Weight: 167 lbs 9.6 oz    General Appearance: Jaundiced, alert and oriented x 3  Eyes: scleral icterus, PERRLA, EOMI  HEENT: MMM, mild pharyngeal erythema    Respiratory: Tachypneic on exam, bibasilar crackles present   Cardiovascular: RRR, systolic murmur  GI: Nontender, slightly distended   Skin: Jaundice, no rashes on limited exam, no telangiectasias, spider angiomas  Neurologic: no asterixis, grossly neuro intact    Data   Data reviewed today: I reviewed all medications, new labs and imaging results over the last 24 hours. I personally reviewed the EKG tracing showing fairly flat t waves.    Recent Labs   Lab 03/13/19  0600 03/13/19  0302 03/12/19  1637 03/12/19  0610  03/11/19  0614  03/10/19  0642   WBC 6.2  --   --  3.8*  --  3.7*  --  8.3   HGB 8.5*  --   --  8.5*  --  9.2*  --  12.8*   *  --   --  104*  --  101*  --  98   PLT 46*  --   --  40*  --  42*  --  80*   INR 2.40*  --   --  2.31*  --  2.25*  --  2.09*     --   --  142  --  141  --  135   POTASSIUM 3.5 3.5 3.3* 3.0*   < > 2.5*   < > 2.1*   CHLORIDE 112*  --   --  117*  --  113*  --  102   CO2 14*  --   --  16*  --  20  --  20   BUN 16  --   --  13  --  14  --  18   CR 1.41*  --   --  1.36*  --  1.44*  --  1.68*   ANIONGAP 14  --   --  9  --  8  --  14   JULISSA 8.8  --   --  8.4*  --  8.0*  --  8.5   GLC 90  --   --  85  --  82  --  113*   ALBUMIN 3.5  --   --  2.8*  --  2.3*  --  1.9*   PROTTOTAL 5.5*  --   --  4.9*  --  4.6*  --  5.6*   BILITOTAL 36.3*  35.5*  --  30.8*  --  26.8*  --  28.6*   ALKPHOS 155*  --   --  174*  --  214*  --  329*   ALT 22  --   --  26  --  28  --  41   AST 77*  --   --  95*  --  115*  --  171*   LIPASE  --   --   --   --   --   --   --  252    < > = values in this interval not displayed.

## 2019-03-13 NOTE — PLAN OF CARE
Vitals: Vitals stable, on room air, afebrile.  Endocrine: n/a  Labs: Elevated liver enzymes, creatinine stable. Potassium 3.5 40 meq orally and started on scheduled oral K+.  Pain: Patient denies pain.  PRN's: n/a  Diet: 2 GM Na diet, poor appetite.  LDA: PIV X1 saline locked.  GI: 4 loose BM's, Lactulose held this morning.  : Voiding dark waleska, tea colored urine.  Skin: Jaundiced, skin intact.  Neuro: OT noted some improvement in cognitive impairment today, is oriented, off on the day of the month by a week, does tend to have garbled speech at times. His wife noted the speech but thought he was not confused. She did express some concern about him returning to work and driving in the condition that his is in now.  Mobility: Minimal stand by assist, encourage ambulation.  Education: n/a  Plan: Liver ultrasound completed, to start on oral Prednisone.

## 2019-03-13 NOTE — PLAN OF CARE
"/67 (BP Location: Left arm)   Pulse 88   Temp 99.6  F (37.6  C) (Oral)   Resp 18   Ht 1.778 m (5' 10\")   Wt 76.6 kg (168 lb 14.4 oz)   SpO2 96%   BMI 24.23 kg/m  . T-max: 99.6 oral, HR: 100-102, OVSS, on RA. CIWA score: 0. Pt. in NSR & sinus tachycardia during the night.  Pt. denies pain or nausea. Voiding spontaneously, 3 loose stools reported this shift. K+ 3.5 & pt. given 20meq Klor-con tab. X1 per protocol. Next K+ level with morning labs. Pt. up with standby assist. 2 PIVs saline locked. Pt. slept well between cares. Continue to follow POC & update team with changes/concerns.   "

## 2019-03-13 NOTE — PLAN OF CARE
Discharge Planner OT   Patient plan for discharge: Patient is eager to discharge home  Current status: Patient completed safety questionnaire for cognitive activity, answering 6/7 correctly. Patient ambulated around unit for endurance and path finding activity with SBA, SOB but O2 sats in mid 90's with activity. Encouraged to sit up in chair for a bit for improved lung health, patient in agreement.   Barriers to return to prior living situation: cognition, medical status  Recommendations for discharge: home with assist and OP rehab to address alcohol issues. When discussing concerns with returning home, patient has none, wife is concerned about his awareness/safety when driving.  Rationale for recommendations: will continue with OT to maximize safety and IND with functional mobility and ADLs/cognition.        Entered by: MINE IVORY HELD 03/13/2019 10:55 AM

## 2019-03-14 ENCOUNTER — APPOINTMENT (OUTPATIENT)
Dept: CT IMAGING | Facility: CLINIC | Age: 54
End: 2019-03-14
Payer: COMMERCIAL

## 2019-03-14 ENCOUNTER — APPOINTMENT (OUTPATIENT)
Dept: GENERAL RADIOLOGY | Facility: CLINIC | Age: 54
End: 2019-03-14
Payer: COMMERCIAL

## 2019-03-14 ENCOUNTER — APPOINTMENT (OUTPATIENT)
Dept: OCCUPATIONAL THERAPY | Facility: CLINIC | Age: 54
End: 2019-03-14
Payer: COMMERCIAL

## 2019-03-14 LAB
ABO + RH BLD: NORMAL
ABO + RH BLD: NORMAL
ALBUMIN SERPL-MCNC: 3.3 G/DL (ref 3.4–5)
ALBUMIN UR-MCNC: NEGATIVE MG/DL
ALP SERPL-CCNC: 144 U/L (ref 40–150)
ALT SERPL W P-5'-P-CCNC: 24 U/L (ref 0–70)
ANION GAP SERPL CALCULATED.3IONS-SCNC: 13 MMOL/L (ref 3–14)
APPEARANCE UR: ABNORMAL
AST SERPL W P-5'-P-CCNC: 65 U/L (ref 0–45)
BACTERIA #/AREA URNS HPF: ABNORMAL /HPF
BASE DEFICIT BLDV-SCNC: 10.2 MMOL/L
BILIRUB DIRECT SERPL-MCNC: 29 MG/DL (ref 0–0.2)
BILIRUB SERPL-MCNC: 40.2 MG/DL (ref 0.2–1.3)
BILIRUB UR QL STRIP: ABNORMAL
BLD GP AB SCN SERPL QL: NORMAL
BLD PROD TYP BPU: NORMAL
BLOOD BANK CMNT PATIENT-IMP: NORMAL
BUN SERPL-MCNC: 18 MG/DL (ref 7–30)
CALCIUM SERPL-MCNC: 8.9 MG/DL (ref 8.5–10.1)
CHLORIDE SERPL-SCNC: 116 MMOL/L (ref 94–109)
CO2 SERPL-SCNC: 12 MMOL/L (ref 20–32)
COLOR UR AUTO: ABNORMAL
CREAT SERPL-MCNC: 1.37 MG/DL (ref 0.66–1.25)
ERYTHROCYTE [DISTWIDTH] IN BLOOD BY AUTOMATED COUNT: 18.3 % (ref 10–15)
GFR SERPL CREATININE-BSD FRML MDRD: 57 ML/MIN/{1.73_M2}
GLUCOSE SERPL-MCNC: 156 MG/DL (ref 70–99)
GLUCOSE UR STRIP-MCNC: NEGATIVE MG/DL
HCO3 BLDV-SCNC: 14 MMOL/L (ref 21–28)
HCT VFR BLD AUTO: 24.7 % (ref 40–53)
HGB BLD-MCNC: 8.2 G/DL (ref 13.3–17.7)
HGB UR QL STRIP: ABNORMAL
INR PPP: 2.37 (ref 0.86–1.14)
KETONES UR STRIP-MCNC: NEGATIVE MG/DL
LEUKOCYTE ESTERASE UR QL STRIP: ABNORMAL
MAGNESIUM SERPL-MCNC: 2.1 MG/DL (ref 1.6–2.3)
MCH RBC QN AUTO: 35.5 PG (ref 26.5–33)
MCHC RBC AUTO-ENTMCNC: 33.2 G/DL (ref 31.5–36.5)
MCV RBC AUTO: 107 FL (ref 78–100)
MUCOUS THREADS #/AREA URNS LPF: PRESENT /LPF
NITRATE UR QL: NEGATIVE
NUM BPU REQUESTED: 2
O2/TOTAL GAS SETTING VFR VENT: 21 %
PCO2 BLDV: 24 MM HG (ref 40–50)
PH BLDV: 7.37 PH (ref 7.32–7.43)
PH UR STRIP: 6.5 PH (ref 5–7)
PLATELET # BLD AUTO: 41 10E9/L (ref 150–450)
PO2 BLDV: 40 MM HG (ref 25–47)
POTASSIUM SERPL-SCNC: 3.6 MMOL/L (ref 3.4–5.3)
PROT SERPL-MCNC: 5.2 G/DL (ref 6.8–8.8)
RBC # BLD AUTO: 2.31 10E12/L (ref 4.4–5.9)
RBC #/AREA URNS AUTO: 3 /HPF (ref 0–2)
SODIUM SERPL-SCNC: 141 MMOL/L (ref 133–144)
SOURCE: ABNORMAL
SP GR UR STRIP: 1.01 (ref 1–1.03)
SPECIMEN EXP DATE BLD: NORMAL
TRANS CELLS #/AREA URNS HPF: <1 /HPF (ref 0–1)
UROBILINOGEN UR STRIP-MCNC: NORMAL MG/DL (ref 0–2)
WBC # BLD AUTO: 4.6 10E9/L (ref 4–11)
WBC #/AREA URNS AUTO: 5 /HPF (ref 0–5)

## 2019-03-14 PROCEDURE — 86923 COMPATIBILITY TEST ELECTRIC: CPT | Performed by: INTERNAL MEDICINE

## 2019-03-14 PROCEDURE — 86901 BLOOD TYPING SEROLOGIC RH(D): CPT | Performed by: INTERNAL MEDICINE

## 2019-03-14 PROCEDURE — 25000132 ZZH RX MED GY IP 250 OP 250 PS 637: Performed by: STUDENT IN AN ORGANIZED HEALTH CARE EDUCATION/TRAINING PROGRAM

## 2019-03-14 PROCEDURE — 99221 1ST HOSP IP/OBS SF/LOW 40: CPT | Performed by: STUDENT IN AN ORGANIZED HEALTH CARE EDUCATION/TRAINING PROGRAM

## 2019-03-14 PROCEDURE — 70450 CT HEAD/BRAIN W/O DYE: CPT

## 2019-03-14 PROCEDURE — 83605 ASSAY OF LACTIC ACID: CPT | Performed by: STUDENT IN AN ORGANIZED HEALTH CARE EDUCATION/TRAINING PROGRAM

## 2019-03-14 PROCEDURE — 36415 COLL VENOUS BLD VENIPUNCTURE: CPT | Performed by: STUDENT IN AN ORGANIZED HEALTH CARE EDUCATION/TRAINING PROGRAM

## 2019-03-14 PROCEDURE — 25000132 ZZH RX MED GY IP 250 OP 250 PS 637

## 2019-03-14 PROCEDURE — 40000141 ZZH STATISTIC PERIPHERAL IV START W/O US GUIDANCE

## 2019-03-14 PROCEDURE — 71045 X-RAY EXAM CHEST 1 VIEW: CPT

## 2019-03-14 PROCEDURE — 85027 COMPLETE CBC AUTOMATED: CPT | Performed by: STUDENT IN AN ORGANIZED HEALTH CARE EDUCATION/TRAINING PROGRAM

## 2019-03-14 PROCEDURE — 82248 BILIRUBIN DIRECT: CPT | Performed by: STUDENT IN AN ORGANIZED HEALTH CARE EDUCATION/TRAINING PROGRAM

## 2019-03-14 PROCEDURE — 99233 SBSQ HOSP IP/OBS HIGH 50: CPT | Mod: GC | Performed by: STUDENT IN AN ORGANIZED HEALTH CARE EDUCATION/TRAINING PROGRAM

## 2019-03-14 PROCEDURE — 80053 COMPREHEN METABOLIC PANEL: CPT | Performed by: STUDENT IN AN ORGANIZED HEALTH CARE EDUCATION/TRAINING PROGRAM

## 2019-03-14 PROCEDURE — 81001 URINALYSIS AUTO W/SCOPE: CPT | Performed by: STUDENT IN AN ORGANIZED HEALTH CARE EDUCATION/TRAINING PROGRAM

## 2019-03-14 PROCEDURE — 85610 PROTHROMBIN TIME: CPT

## 2019-03-14 PROCEDURE — 86900 BLOOD TYPING SEROLOGIC ABO: CPT | Performed by: INTERNAL MEDICINE

## 2019-03-14 PROCEDURE — 86850 RBC ANTIBODY SCREEN: CPT | Performed by: INTERNAL MEDICINE

## 2019-03-14 PROCEDURE — 12000012 ZZH R&B MS OVERFLOW UMMC

## 2019-03-14 PROCEDURE — 25800030 ZZH RX IP 258 OP 636: Performed by: STUDENT IN AN ORGANIZED HEALTH CARE EDUCATION/TRAINING PROGRAM

## 2019-03-14 PROCEDURE — 82803 BLOOD GASES ANY COMBINATION: CPT | Performed by: STUDENT IN AN ORGANIZED HEALTH CARE EDUCATION/TRAINING PROGRAM

## 2019-03-14 PROCEDURE — 83735 ASSAY OF MAGNESIUM: CPT | Performed by: STUDENT IN AN ORGANIZED HEALTH CARE EDUCATION/TRAINING PROGRAM

## 2019-03-14 PROCEDURE — 97110 THERAPEUTIC EXERCISES: CPT | Mod: GO | Performed by: OCCUPATIONAL THERAPIST

## 2019-03-14 PROCEDURE — 25000128 H RX IP 250 OP 636: Performed by: STUDENT IN AN ORGANIZED HEALTH CARE EDUCATION/TRAINING PROGRAM

## 2019-03-14 PROCEDURE — 87040 BLOOD CULTURE FOR BACTERIA: CPT | Performed by: STUDENT IN AN ORGANIZED HEALTH CARE EDUCATION/TRAINING PROGRAM

## 2019-03-14 RX ORDER — SODIUM BICARBONATE 650 MG/1
1300 TABLET ORAL ONCE
Status: COMPLETED | OUTPATIENT
Start: 2019-03-14 | End: 2019-03-14

## 2019-03-14 RX ORDER — POTASSIUM CHLORIDE 1500 MG/1
60 TABLET, EXTENDED RELEASE ORAL 2 TIMES DAILY
Status: DISCONTINUED | OUTPATIENT
Start: 2019-03-14 | End: 2019-03-14

## 2019-03-14 RX ORDER — LANOLIN ALCOHOL/MO/W.PET/CERES
3 CREAM (GRAM) TOPICAL
Status: DISCONTINUED | OUTPATIENT
Start: 2019-03-14 | End: 2019-03-22 | Stop reason: HOSPADM

## 2019-03-14 RX ORDER — POTASSIUM CHLORIDE 750 MG/1
40 TABLET, EXTENDED RELEASE ORAL 2 TIMES DAILY
Status: DISCONTINUED | OUTPATIENT
Start: 2019-03-14 | End: 2019-03-20

## 2019-03-14 RX ORDER — LACTULOSE 10 G/15ML
20 SOLUTION ORAL
Status: DISCONTINUED | OUTPATIENT
Start: 2019-03-14 | End: 2019-03-22 | Stop reason: HOSPADM

## 2019-03-14 RX ORDER — SODIUM BICARBONATE 650 MG/1
650 TABLET ORAL 2 TIMES DAILY
Status: DISCONTINUED | OUTPATIENT
Start: 2019-03-15 | End: 2019-03-16

## 2019-03-14 RX ORDER — LACTULOSE 10 G/15ML
100 SOLUTION ORAL
Status: DISCONTINUED | OUTPATIENT
Start: 2019-03-14 | End: 2019-03-22 | Stop reason: HOSPADM

## 2019-03-14 RX ADMIN — PANTOPRAZOLE SODIUM 40 MG: 40 TABLET, DELAYED RELEASE ORAL at 07:46

## 2019-03-14 RX ADMIN — Medication 100 MG: at 07:46

## 2019-03-14 RX ADMIN — THIAMINE HYDROCHLORIDE 500 MG: 100 INJECTION, SOLUTION INTRAMUSCULAR; INTRAVENOUS at 22:18

## 2019-03-14 RX ADMIN — PREDNISOLONE 40 MG: 5 TABLET ORAL at 07:45

## 2019-03-14 RX ADMIN — MELATONIN TAB 3 MG 3 MG: 3 TAB at 19:41

## 2019-03-14 RX ADMIN — FOLIC ACID 1 MG: 1 TABLET ORAL at 07:46

## 2019-03-14 RX ADMIN — LACTULOSE 20 G: 10 POWDER, FOR SOLUTION ORAL at 07:45

## 2019-03-14 RX ADMIN — THIAMINE HYDROCHLORIDE 500 MG: 100 INJECTION, SOLUTION INTRAMUSCULAR; INTRAVENOUS at 16:44

## 2019-03-14 RX ADMIN — POTASSIUM CHLORIDE 20 MEQ: 750 TABLET, EXTENDED RELEASE ORAL at 17:34

## 2019-03-14 RX ADMIN — LACTULOSE 20 G: 10 SOLUTION ORAL at 10:34

## 2019-03-14 RX ADMIN — POTASSIUM CHLORIDE 40 MEQ: 750 TABLET, EXTENDED RELEASE ORAL at 19:42

## 2019-03-14 RX ADMIN — LACTULOSE 20 G: 10 SOLUTION ORAL at 03:18

## 2019-03-14 RX ADMIN — LACTULOSE 20 G: 10 SOLUTION ORAL at 01:16

## 2019-03-14 RX ADMIN — LACTULOSE 20 G: 10 POWDER, FOR SOLUTION ORAL at 16:44

## 2019-03-14 RX ADMIN — LACTULOSE 20 G: 10 SOLUTION ORAL at 18:55

## 2019-03-14 RX ADMIN — SODIUM BICARBONATE 650 MG TABLET 1300 MG: at 19:41

## 2019-03-14 RX ADMIN — RIFAXIMIN 550 MG: 550 TABLET ORAL at 19:41

## 2019-03-14 RX ADMIN — LACTULOSE 20 G: 10 SOLUTION ORAL at 14:47

## 2019-03-14 RX ADMIN — LACTULOSE 20 G: 10 POWDER, FOR SOLUTION ORAL at 19:41

## 2019-03-14 RX ADMIN — LACTULOSE 20 G: 10 SOLUTION ORAL at 05:21

## 2019-03-14 RX ADMIN — THERA TABS 1 TABLET: TAB at 07:45

## 2019-03-14 RX ADMIN — LACTULOSE 20 G: 10 POWDER, FOR SOLUTION ORAL at 12:51

## 2019-03-14 RX ADMIN — RIFAXIMIN 550 MG: 550 TABLET ORAL at 07:46

## 2019-03-14 RX ADMIN — POTASSIUM CHLORIDE 40 MEQ: 750 TABLET, EXTENDED RELEASE ORAL at 07:45

## 2019-03-14 ASSESSMENT — MIFFLIN-ST. JEOR: SCORE: 1616.9

## 2019-03-14 ASSESSMENT — ACTIVITIES OF DAILY LIVING (ADL)
ADLS_ACUITY_SCORE: 12

## 2019-03-14 NOTE — PROGRESS NOTES
"[facility]    Hepatology Follow-up    CC: Dizziness                  AMS    Dx: Decompensated alcoholic cirrhosis, grade 1 HE                  AH              AMBER  24 hour events:  Confused overnight, more oriented this am, fluctuating renal function and worsening T bilirubin.     Subjective:  Oriented, denies abd pain, distension, N/V  Patient denies fevers, sweats or chills.    Medications  Current Facility-Administered Medications   Medication Dose Route Frequency     folic acid  1 mg Oral Daily     lactulose  20 g Oral 4x Daily     multivitamin, therapeutic  1 tablet Oral Daily     pantoprazole  40 mg Oral QAM AC     potassium chloride  40 mEq Oral BID     prednisoLONE  40 mg Oral Daily     promethazine  12.5 mg Intravenous Once     rifaximin  550 mg Oral BID     sodium chloride (PF)  3 mL Intracatheter Q8H     traZODone  50 mg Oral At Bedtime       Review of systems  A 10-point review of systems was negative.    Examination  /77 (BP Location: Left arm)   Pulse 98   Temp 98.1  F (36.7  C) (Oral)   Resp 20   Ht 1.778 m (5' 10\")   Wt 77.6 kg (171 lb)   SpO2 94%   BMI 24.54 kg/m      Intake/Output Summary (Last 24 hours) at 3/11/2019 1050  Last data filed at 3/11/2019 1038  Gross per 24 hour   Intake 500 ml   Output 901 ml   Net -401 ml       Gen- NAD, A+Ox2, icteric  CVS- RRR  RS- CTA  Abd- Soft, NT, ND, + BS  Extr-No edema  Neuro- No asterexis  Skin- Jaundice  Psych- normal mood  Lym- no LAD    Laboratory  Lab Results   Component Value Date     03/11/2019    POTASSIUM 2.5 03/11/2019    CHLORIDE 113 03/11/2019    CO2 20 03/11/2019    BUN 14 03/11/2019    CR 1.44 03/11/2019       Lab Results   Component Value Date    BILITOTAL 26.8 03/11/2019    ALT 28 03/11/2019     03/11/2019    ALKPHOS 214 03/11/2019       Lab Results   Component Value Date    WBC 3.7 03/11/2019    HGB 9.2 03/11/2019     03/11/2019    PLT 42 03/11/2019       Lab Results   Component " Value Date    INR 2.25 03/11/2019     MELD-Na score: 33 at 3/14/2019  6:02 AM  MELD score: 33 at 3/14/2019  6:02 AM  Calculated from:  Serum Creatinine: 1.37 mg/dL at 3/14/2019  6:02 AM  Serum Sodium: 141 mmol/L (Rounded to 137 mmol/L) at 3/14/2019  6:02 AM  Total Bilirubin: 40.2 mg/dL at 3/14/2019  6:02 AM  INR(ratio): 2.37 at 3/14/2019  6:02 AM  Age: 53 years    Assessment  53 year old male with a history of alcohol misuse, decompensated alcoholic cirrhosis, history of ascites (s/p TIPS 5/14/18, 6/6/18), HE, EV and variceal bleed (Oct 2017) who presents for evaluation of dizziness, nausea and recent mental changes in setting of relapsed alcohol use.  Sober for 18 months, relapsed drinking in January 2019 Per patient's wife, noted to be more confused over last few days. Presentation suggestive of grade 1 HE, with elevated transaminases suggestive of AH and intrahepatic cholestasis, no biliary dilation noted on imaging.  Last EGD Apr 2018- medium EV with bandingx4, mild portal hypertensive gastropathy. US w/ doppler 3/10 with mild ascites and significant increase in TIPS stent velocities concerning for stenosis.  Continues to have worsening hyperbilirubinemia and AMBER.    Recommendations  - Dx paracentesis today, please send fluid for cells, differential, albumin, protein, gram stain and culture  - Continue PO Prednisolone 40 mg daily, stop therapy if fails to show signs of improvement after one week (no improvement in bilirubin or DF). This can be addressed/monitored as outpatient with repeat LFTs/PT/INR  - Continue neuro check q4H  - Continue PO Lactulose, titrate to 3-4 BMs per day, avoid over-titration of lactulose to prevent dehydration/FWD  - Continue rifaximin 550 mg BID  - Thiamine and folate  - Daily MELD labs  - PO PPI once daily  - Low salt diet (<2 g/day)  - Protein-calorie supplementation  - GI team will continue to follow with you, please call for questions/concerns     Patient care plan discussed with  Dr. Bales, GI staff physician.      Ulisses Briggs MD  Hepatology  #[phone]

## 2019-03-14 NOTE — PLAN OF CARE
"Patient becoming more confused, walking into other patient rooms, easily directed, calm and cooperative but disoriented. He talks about things that make no sense, \"I need Denise to find the closet door.\". Patient is walking around the unit, expressed wanting to leave with his wife but has since been resting in bed. Team paged regarding worsening mental status changes. Unable to place him on bed/chair alarm due to restlessness, VPM would possibly agitate him worse, will be assessed for sitter. Patient is taking Q2 hour Lactulose, hat in the toilet to assess frequency of BM's as patient has short term memory impairment and is not accurate in reporting frequency.   "

## 2019-03-14 NOTE — PLAN OF CARE
Vitals: Vitals are stable, afebrile, on room air.  Endocrine: Blood glucose 156 per labs.  Labs: Increasing Bilirubin, Potassium 3.6, HGB 8.3, new order for 2U PRBC's.  Pain: Patient denies pain.  PRN's: Lactulose Q 2hours.  Diet: 2 GM Na diet, fair appetite.  LDA: PIV saline locked.  GI: Diarrhea with Lactulose, unable to determine how often as patient flushes.  : Urine is icteric, UA/UC sent.  Skin: Jaundiced, skin intact.  Neuro: Disoriented to time, situation. [facility] Protocol 1 in place this morning, level 2 this afternoon.  Mobility: Up ad dianna.  Education: n/a  Plan: Monitor neuro status, lactulose PRN and scheduled, possible paracentesis today.

## 2019-03-14 NOTE — PLAN OF CARE
Occupational Therapy Discharge Summary    Reason for therapy discharge:    All goals and outcomes met, no further needs identified.    Progress towards therapy goal(s). See goals on Care Plan in [facility] electronic health record for goal details.  Goals met    Therapy recommendation(s):    Continued therapy is recommended.  Rationale/Recommendations:  Pt to benefit from OP rehab to address alcohol abuse.

## 2019-03-14 NOTE — CONSULTS
[facility], [place]  Consult Note - Consult and Procedure Service    Date of Admission:  3/10/2019  Consult Requested by: Dr. Patel  Reason for Consult:  Evaluate for paracentesis    Assessment & Plan   Ivan Bell is a 53 year old male admitted on 3/10/2019. He has a history of NAFLD and recent diagnosis of alcoholic hepatitis along with acute kidney injury.  The CAPS service is consulted for paracentesis.  Patient has had worsening in recent days along with more hypervolemia.    Labs this morning reveal hemoglobin of 8.2 and plts 41.  Patient not on anticoagulant or antiplatelet drugs.     Ultrasound yesterday revealed minimal ascites.    On my scan today there is a very small pocket of perihepatic ascites that is not amenable to safe drainage - there was bowel floating in and out of view in the small pocket.         Will defer procedure for now and recheck tomorrow    The patient's care was discussed with the Patient and Primary team.    Portillo Arriaga MD  [facility], [place]  Pager: [phone]  Please see sticky note for cross cover information  ______________________________________________________________________    Chief Complaint   Evaluate for paracentesis    History is obtained from the patient    History of Present Illness   Ivan Bell is a 53 year old man with a history of alcohol use disorder, end stage liver disease due to alcohol complicated by ascites s/p TIPS x2, HE, EV and bleeding in 2017 who presents with dizziness, nausea and altered mental status.  Of note, patient had a recent relapse after 18 months of sobriety and at admission the patient appears to have alcoholic hepatitis superimposed on cirrhosis.    He has ongoing worsening of his hyperbilirubinemia and also his acute kidney injury and the CAPS service is consulted for evaluation for paracentesis.  It appears last paracentesis was performed in 2018.    He is not receiving  any anticoagulant medications.     Past Medical History    I have reviewed this patient's medical history and updated it with pertinent information if needed.   Past Medical History:   Diagnosis Date     Ascites      Cirrhosis (H)      Esophageal varices (H)      Hepatitis      Hypertension      Left calcaneus fracture 2006: Fell 10 feet from ladder onto left foot on frozen ground on 06 at home.  Immediate pain and unable to walk- seen at [place] and diagnosed with calcaneus fracture     Portal vein thrombosis     left occlusion, partial main       Past Surgical History   I have reviewed this patient's surgical history and updated it with pertinent information if needed.  Past Surgical History:   Procedure Laterality Date     ANKLE SURGERY Left      COLONOSCOPY N/A 3/31/2016    Procedure: COLONOSCOPY;  Surgeon: Rhys Uriostegui MD;  Location:  GI     ESOPHAGOSCOPY, GASTROSCOPY, DUODENOSCOPY (EGD), COMBINED N/A 3/31/2016    Procedure: COMBINED ESOPHAGOSCOPY, GASTROSCOPY, DUODENOSCOPY (EGD);  Surgeon: Rhys Uriostegui MD;  Location:  GI     ESOPHAGOSCOPY, GASTROSCOPY, DUODENOSCOPY (EGD), COMBINED N/A 3/9/2018    Procedure: COMBINED ESOPHAGOSCOPY, GASTROSCOPY, DUODENOSCOPY (EGD), BIOPSY SINGLE OR MULTIPLE;  EGD;  Surgeon: Gonzalo Wahl MD;  Location:  GI     KNEE SURGERY Left      KNEE SURGERY Right      SIGMOIDOSCOPY FLEXIBLE N/A 10/31/2017    Procedure: SIGMOIDOSCOPY FLEXIBLE;;  Surgeon: Armaan Adams MD;  Location:  GI     TIPS Procedure  2018       Social History   I have reviewed this patient's social history and updated it with pertinent information if needed.  Social History     Tobacco Use     Smoking status: Former Smoker     Types: Dip, chew, snus or snuff     Last attempt to quit: 1998     Years since quittin.5     Smokeless tobacco: Current User     Last attempt to quit: 10/24/2017     Tobacco comment: 1 tin per 10  days.   Substance Use Topics     Alcohol use: No     Alcohol/week: 10.5 oz     Types: 21 Cans of beer per week     Comment: last etoh 2/14/16, did have Odouls ~8/2017     Drug use: No       Family History   I have reviewed this patient's family history and updated it with pertinent information if needed.   Family History   Problem Relation Age of Onset     Family History Negative Mother      Family History Negative Father      Hypertension Father      Cerebrovascular Disease Father 87     Breast Cancer Maternal Grandmother      Rheumatoid Arthritis Daughter      Depression Daughter      Cancer - colorectal No family hx of      Prostate Cancer No family hx of      Liver Disease No family hx of        Medications   Current Facility-Administered Medications   Medication     Daily 2 GRAM acetaminophen limit, unless fulminent liver failure or transaminases greater than or equal to 300 - 400, then none     folic acid (FOLVITE) tablet 1 mg     lactulose (CEPHULAC) Packet 20 g     lactulose (CHRONULAC) solution 20 g    Or     lactulose (CHRONULAC) solution for enema prep 100 g     lactulose (CHRONULAC) solution for enema prep 100 g     lidocaine (LMX4) cream     lidocaine 1 % 0.1-1 mL     melatonin tablet 3 mg     multivitamin, therapeutic (THERA-VIT) tablet 1 tablet     naloxone (NARCAN) injection 0.1-0.4 mg     pantoprazole (PROTONIX) EC tablet 40 mg     potassium chloride (KLOR-CON) Packet 20-40 mEq     potassium chloride 10 mEq in 100 mL intermittent infusion with 10 mg lidocaine     potassium chloride 10 mEq in 100 mL sterile water intermittent infusion (premix)     potassium chloride 20 mEq in 50 mL intermittent infusion     potassium chloride ER (K-DUR/KLOR-CON M) CR tablet 20-40 mEq     potassium chloride ER (K-DUR/KLOR-CON M) CR tablet 40 mEq     prednisoLONE tablet 40 mg     promethazine (PHENERGAN) IV injection 12.5 mg     rifaximin (XIFAXAN) tablet 550 mg     sodium chloride (PF) 0.9% PF flush 3 mL     sodium  chloride (PF) 0.9% PF flush 3 mL     thiamine (B-1) 500 mg in sodium chloride 0.9 % 50 mL intermittent infusion       Allergies   Allergies   Allergen Reactions     Benadryl [Diphenhydramine] Other (See Comments)     Delirium (visual and auditory hallucinations)     Oxycodone Other (See Comments)     Delirium and constipation       Physical Exam   Vital Signs: Temp: 98.1  F (36.7  C) Temp src: Oral BP: 138/77 Pulse: 98 Heart Rate: 98 Resp: 20 SpO2: 94 % O2 Device: None (Room air)    Weight: 171 lbs 0 oz  Gen: alert, awake, oriented  Abdominal: Soft, mildly distended, no tenderness to palpation.    Data   Most Recent 3 CBC's:  Recent Labs   Lab Test 03/14/19  0602 03/13/19  0600 03/12/19  0610   WBC 4.6 6.2 3.8*   HGB 8.2* 8.5* 8.5*   * 106* 104*   PLT 41* 46* 40*     Most Recent 3 BMP's:  Recent Labs   Lab Test 03/14/19 0602 03/13/19  0600 03/13/19  0302  03/12/19  0610    140  --   --  142   POTASSIUM 3.6 3.5 3.5   < > 3.0*   CHLORIDE 116* 112*  --   --  117*   CO2 12* 14*  --   --  16*   BUN 18 16  --   --  13   CR 1.37* 1.41*  --   --  1.36*   ANIONGAP 13 14  --   --  9   JULISSA 8.9 8.8  --   --  8.4*   * 90  --   --  85    < > = values in this interval not displayed.     Most Recent 2 LFT's:  Recent Labs   Lab Test 03/14/19 0602 03/13/19  0600   AST 65* 77*   ALT 24 22   ALKPHOS 144 155*   BILITOTAL 40.2* 36.3*

## 2019-03-14 NOTE — PLAN OF CARE
"/79 (BP Location: Left arm)   Pulse 88   Temp 98.1  F (36.7  C) (Oral)   Resp 20   Ht 1.778 m (5' 10\")   Wt 76 kg (167 lb 9.6 oz)   SpO2 96%   BMI 24.05 kg/m  .  T-max: 99.0 oral, HR: 100-106, OVSS, on RA. Pt. confused & disoriented to date, time, place & situation & on-call, Dr. Garcia, notified & restarted prn Lactulose for HE. Pt. given prn Lactulose x3 doses per protocol. Pt. had 4 loose stools this shift, voiding but not always saving when having stools. Pt. denies pain or nausea. Left PIV saline locked. Pt. slept very little despite receiving Remeron at HS.  Pt. not using call light & reminded many times to use, bed alarm on. Pt's spouse called for update & will be coming around 1400 today. Continue to monitor pt. closely & update MD with changes/concerns.   "

## 2019-03-14 NOTE — PLAN OF CARE
[id number]:  Pt wandering halls at beginning of shift, asking for keys so he could leave. Wife present in room and unable to redirect patient. Placed on a sitter for safety. VSS, denies pain. PRN lactulose given per [facility] protocol. Has reached limit for PO intake for the day, has already exceded 2L limit. Head CT completed, results pending. Potassium replaced per PRN protocol with recheck in the morning.

## 2019-03-14 NOTE — PLAN OF CARE
Discharge Planner OT   Patient plan for discharge: home with wife  Current status: Pt declining OOB activities this session. Therapist addressed any concerns for home, AE needs, and discharge plans. Therapist educated pt on continuing to participate in exercise and activity to increase endurance and strength. Pt DISCH from OT services, pt is indep with all ADL's and currently ambulating independently in hallways without a device.   Barriers to return to prior living situation: cognition   Recommendations for discharge: home with wife and OP chemical rehab to address alcohol abuse  Rationale for recommendations: Pt is indep with ADLs and ambulating in hallways with indep no device used. Pt to benefit from OP to address alcohol abuse.        Entered by: Susan Simmons 03/14/2019 1:00 PM

## 2019-03-14 NOTE — PROGRESS NOTES
[facility], [place]    History and Physical - Constance 2 Service        Date of Admission:  3/10/2019    Assessment & Plan   Ivan Bell is a 53 year old male with history of NAFLD cirrhosis s/p TIPS who presents with superimposed EtOH hepaitis w/o ascites in setting of relapsed alcohol use and AMBER. Patient's total bilirubin continues to increase. He has also had worsening confusion over the last 24 hours.     TODAY  - CT head for confusion   - Sodium bicarbonate started   - IV thiamine started   - Discontinued trazodone     # NAFLD cirrhosis  # Alcohol hepatis, superimposed  # Alcohol use disorder  # MELD 33, worsening total bilirubin   Patient sober for 18 months but then started drinking again in January. Presented to the hospital with laboratory values concerning for EtOH hepatitis. Patient's Maddrey score was 88 but no steroids were started. Abdominal ultrasound on admission showed no evidence of thrombosis or ascites but did show increased stent velocities concerning for stenosis. Abdominal ultrasound repeated 3/13 with no significant change. Patient's total bilirubin continues to rise despite steroids. Patient's confusion worsened over the last 24 hours. Nystagmus and confabulation on exam (no ataxia), so some concern for Wernicke's in setting of alcohol abuse. However, we will also rule out infection and get CT head.   - Hepatology following, appreciate recs  - 40mg Prednisolone for EtOH hepatitis   - CXR, UA, blood cultures  - CAPS consult for diagnostic paracentesis. Will attempt again 3/15.   - CT head   - Lactulose QID   - Daily MELD labs   - Daily weights, 2L fluid restriction, 2g sodium restricted diet  - PPI once daily       MELD-Na score: 33 at 3/14/2019  6:02 AM  MELD score: 33 at 3/14/2019  6:02 AM  Calculated from:  Serum Creatinine: 1.37 mg/dL at 3/14/2019  6:02 AM  Serum Sodium: 141 mmol/L (Rounded to 137 mmol/L) at 3/14/2019  6:02 AM  Total Bilirubin: 40.2 mg/dL  at 3/14/2019  6:02 AM  INR(ratio): 2.37 at 3/14/2019  6:02 AM  Age: 53 years    Consults  - Chem dep consult, appreciate recs   - Hepatology consult, appreciate recs    Other:  - CIWA monitoring w/o prn benzo    Treatment  - Scheduled lactulose  - Rifaxamin    Follow up  - TIPS ?stenosis follow up as OP  - Outpatient chem dep      #Hypokalemia  #RTA Type II    #NAGMA   Patient requiring ~330mEq of potassium upon admission to bring potassium from 2.1 --> 3.7. Urine potassium of 40. Thought to be secondary to RTA Type II in setting of pre-renal injury resulting in AMBRE. Decreased amount of potassium repletion (100mEq) over the last 24 hours. Worsening bicarbonate this AM.   - Renal consult, appreciate recs  - 40mEq potassium BID   - Potassium repletion protocol   - 1300mg sodium bicarb tonight then 650mg BID   - Daily BMP    # AMBER  Likely pre-renal in setting of poor po intake and high EtOH intake prior to admission. Responded well to albumin challenge. Creatinine increased slightly this AM.   - Continue to monitor     # Nicotine use disorder  Has 60 year pack history, quit smoking in 1994 but he still uses chewing tobacco. He is interested in quitting nicotine altogether and understands cancer risk associated with smokeless tobacco. Not interested in any nicotine replacement at this time.      Diet: 2g restricted sodium diet, 2L fluid restricted diet   Fluids: None  DVT Prophylaxis: VTE Prophylaxis contraindicated due to elevated INR  Blue Catheter: not present  Code Status: Full, discussed with patient and wife    Disposition Plan   Expected discharge: 1-2 days recommended to prior living arrangement once Resolution of AMBER, improved liver function.  Entered: Leonarda Cabrera MD 03/14/2019, 7:50 AM       The patient's care was discussed with the attending physician, Dr. Patel.     DO Constance Levine 2 Service  [facility], [place]  Pager: [phone]  Please see sticky note for  cross cover information  ______________________________________________________________________    Subjective  No acute events overnight but patient is having worsening confusion. Patient states he feels well today. Wife is at bedside - very concerned about patient's worsening confusion. Patient denies confusion. Patient denies pain. States he has been having a lot of loose stools per day. Denies chest pain, SOB, cough, abdominal pain.       Physical Exam   Vital Signs: Temp: 98.1  F (36.7  C) Temp src: Oral BP: 152/79   Heart Rate: 100 Resp: 20 SpO2: 96 % O2 Device: None (Room air)    Weight: 167 lbs 9.6 oz    General Appearance: Jaundiced, alert and oriented x 3  Eyes: scleral icterus, PERRLA, EOMI  HEENT: MMM, mild pharyngeal erythema    Respiratory: Tachypneic on exam, bibasilar crackles present   Cardiovascular: RRR, systolic murmur  GI: Nontender, slightly distended   Skin: Jaundice, no rashes on limited exam, no telangiectasias, spider angiomas  Neurologic: no asterixis, nystagmus present, CN II-XII intact, sensation intact bilaterally, 5/5 muscle strength in UE and LE     Data   Data reviewed today: I reviewed all medications, new labs and imaging results over the last 24 hours. I personally reviewed the EKG tracing showing fairly flat t waves.    Recent Labs   Lab 03/14/19  0602 03/13/19  0600 03/13/19  0302  03/12/19  0610  03/10/19  0642   WBC 4.6 6.2  --   --  3.8*   < > 8.3   HGB 8.2* 8.5*  --   --  8.5*   < > 12.8*   * 106*  --   --  104*   < > 98   PLT 41* 46*  --   --  40*   < > 80*   INR 2.37* 2.40*  --   --  2.31*   < > 2.09*    140  --   --  142   < > 135   POTASSIUM 3.6 3.5 3.5   < > 3.0*   < > 2.1*   CHLORIDE 116* 112*  --   --  117*   < > 102   CO2 12* 14*  --   --  16*   < > 20   BUN 18 16  --   --  13   < > 18   CR 1.37* 1.41*  --   --  1.36*   < > 1.68*   ANIONGAP 13 14  --   --  9   < > 14   JULISSA 8.9 8.8  --   --  8.4*   < > 8.5   * 90  --   --  85   < > 113*   ALBUMIN  3.3* 3.5  --   --  2.8*   < > 1.9*   PROTTOTAL 5.2* 5.5*  --   --  4.9*   < > 5.6*   BILITOTAL 40.2* 36.3* 35.5*  --  30.8*   < > 28.6*   ALKPHOS 144 155*  --   --  174*   < > 329*   ALT 24 22  --   --  26   < > 41   AST 65* 77*  --   --  95*   < > 171*   LIPASE  --   --   --   --   --   --  252    < > = values in this interval not displayed.

## 2019-03-14 NOTE — PROGRESS NOTES
Nephrology Progress Note  March 14, 2019      ASSESSMENT AND RECOMMENDATIONS:   Ivan Bell is a 53 year old male admitted on 3/10/2019. He has a PMH of cirrhosis s/p TIPS 6/2018 with prior complications of ascites, variceal bleed, esophageal varices s/p banding in 2018, and alcohol use disorder who came to the hospital with nausea, jaundice, and lightheadedness in the setting of recent alcohol relapse; he was found to to have AMBER with creatinine of 1.68 on admission and hypokalemia with K of 2.1 on admission. EKG was significant for flat T waves. He received over 330 mEq of potassium, bringing K up from 2.1 to 3.7. Most recent K was 3.5. Also showing non-gap metabolic acidosis with hypocapnia, suggestive of type II RTA.      Recommendations:   - Start Bicarb Oral - 650mg BID   - 1.5 L FR   - Standing daily weights   - Agree with Encephalopathy work up    # AMBER   Patient likely had a prerenal AMBER which converted to ATN from decreased PO intake due to nausea and increased stool output after restarting lactulose. His creatinine on admission was 1.68, up from his baseline of about 1.1. AMBER improved with volume (received 75 g of albumin x 3 with improvement in creatinine).   --  At this time, renal function remains relatively stable with Crt ~1.3. However, concerning re: encephalopathy and rising LFTs for worsening liver disease vs infection which could neg affect his renal function. Recommend continued supportive cares and daily weights for volume management (weight is uptrending - if developing SOB/Hypoxia - may need to trial diuretics).     # Hypokalemia   # Type II renal tubular acidosis   Potassium remains stable and at a safe level with replacement. He continue to have ongoing loss in the stool. Agree with ongoing replacement.   Bicarb continues to drop, related to renal dysfunction and GI loss. Well compensated by VBG. However, given degree of loss and ongoing stools, recommend starting oral bicarbonate as  "above.     Recommendations were communicated to primary team.     Seen and discussed with Dr. Su Ellis MD   [id number]    SUBJECTIVE:  Nursing notes reviewed. Increasing confusion, disorientation. He is not in distress, and denies any major issues. Still having lots of BMs. Making urine. No fevers, chest pain. He is mildly SOB at rest due to distended abdomen, but ambulating without issue.      Current Meds    folic acid  1 mg Oral Daily     lactulose  20 g Oral 4x Daily     multivitamin, therapeutic  1 tablet Oral Daily     pantoprazole  40 mg Oral QAM AC     potassium chloride  40 mEq Oral BID     prednisoLONE  40 mg Oral Daily     promethazine  12.5 mg Intravenous Once     rifaximin  550 mg Oral BID     sodium chloride (PF)  3 mL Intracatheter Q8H     thiamine  500 mg Intravenous TID     Infusion Meds    - MEDICATION INSTRUCTIONS -         ALLERGIES:    Allergies   Allergen Reactions     Benadryl [Diphenhydramine] Other (See Comments)     Delirium (visual and auditory hallucinations)     Oxycodone Other (See Comments)     Delirium and constipation       REVIEW OF SYSTEMS:  A comprehensive of systems was negative except as noted above.      PHYSICAL EXAM:   Temp  Av.9  F (37.2  C)  Min: 97.9  F (36.6  C)  Max: 100  F (37.8  C)      Pulse  Av  Min: 86  Max: 96 Resp  Av  Min: 14  Max: 21  SpO2  Av.9 %  Min: 90 %  Max: 100 %       /78 (BP Location: Left arm)   Pulse 98   Temp 98.9  F (37.2  C) (Oral)   Resp 28   Ht 1.778 m (5' 10\")   Wt 77.6 kg (171 lb)   SpO2 95%   BMI 24.54 kg/m     Date 19 0700 - 19 0659   Shift 4817-2345 8598-3177 3686-5527 24 Hour Total   INTAKE   P.O. 320   320   Shift Total(mL/kg) 320(4.21)   320(4.21)   OUTPUT   Urine 200   200   Shift Total(mL/kg) 200(2.63)   200(2.63)   Weight (kg) 76.02 76.02 76.02 76.02      Admit Weight: 74.8 kg (165 lb)     GENERAL APPEARANCE: Laying in bed in NAD   EYES: Positive for faint scleral icterus, " pupils equal  Pulmonary: Air entry bilaterally, Faint crackles auscultated. Symmetric chest rise.   CV: Regular rhythm, normal rate  GI: Distended, soft, nontender, normal bowel sounds  MSK: No LE edema. Distal pulses intact bilaterally.   : No kenney catheter in place   SKIN: Positive for jaundice   NEURO: Alert, but not responding to questions appropriately. Appears confused.     LABS:   CMP  Recent Labs   Lab 03/14/19  0602 03/13/19  0600 03/13/19  0302 03/12/19  1637 03/12/19  0610  03/11/19  0614  03/10/19  0642    140  --   --  142  --  141  --  135   POTASSIUM 3.6 3.5 3.5 3.3* 3.0*   < > 2.5*   < > 2.1*   CHLORIDE 116* 112*  --   --  117*  --  113*  --  102   CO2 12* 14*  --   --  16*  --  20  --  20   ANIONGAP 13 14  --   --  9  --  8  --  14   * 90  --   --  85  --  82  --  113*   BUN 18 16  --   --  13  --  14  --  18   CR 1.37* 1.41*  --   --  1.36*  --  1.44*  --  1.68*   GFRESTIMATED 57* 55*  --   --  57*  --  54*  --  45*   GFRESTBLACK 67 64  --   --  66  --  63  --  52*   JULISSA 8.9 8.8  --   --  8.4*  --  8.0*  --  8.5   MAG 2.1  --   --   --   --   --  2.4*  --  2.1   PROTTOTAL 5.2* 5.5*  --   --  4.9*  --  4.6*  --  5.6*   ALBUMIN 3.3* 3.5  --   --  2.8*  --  2.3*  --  1.9*   BILITOTAL 40.2* 36.3* 35.5*  --  30.8*  --  26.8*  --  28.6*   ALKPHOS 144 155*  --   --  174*  --  214*  --  329*   AST 65* 77*  --   --  95*  --  115*  --  171*   ALT 24 22  --   --  26  --  28  --  41    < > = values in this interval not displayed.     CBC  Recent Labs   Lab 03/14/19  0602 03/13/19  0600 03/12/19 0610 03/11/19 0614   HGB 8.2* 8.5* 8.5* 9.2*   WBC 4.6 6.2 3.8* 3.7*   RBC 2.31* 2.37* 2.42* 2.66*   HCT 24.7* 25.1* 25.2* 26.8*   * 106* 104* 101*   MCH 35.5* 35.9* 35.1* 34.6*   MCHC 33.2 33.9 33.7 34.3   RDW 18.3* 18.6* 18.7* 18.9*   PLT 41* 46* 40* 42*     INR  Recent Labs   Lab 03/14/19  0602 03/13/19  0600 03/12/19 0610 03/11/19 0614   INR 2.37* 2.40* 2.31* 2.25*     ABG  Recent Labs    Lab 03/14/19  0602   O2PER 21.0      URINE STUDIES  Recent Labs   Lab Test 03/14/19  1300 03/11/19  1040 06/09/18 2003 03/09/16  0017  12/27/13  0918 12/18/12  1642 03/30/11  0830   COLOR Dark Yellow Dark Yellow Yellow Dark Yellow   < > Yellow Yellow Yellow   APPEARANCE Slightly Cloudy Clear Clear Slightly Cloudy   < > Clear Clear Clear   URINEGLC Negative Negative Negative Negative   < > Negative Negative Negative   URINEBILI Large* Large* Negative Large   This is an unconfirmed screening test result. A positive result may be false.  *   < > Negative Negative Small*   URINEKETONE Negative Negative Negative Negative   < > Negative 15* Negative   SG 1.009 1.007 1.008 1.010   < > 1.020 1.015 1.020   UBLD Small* Moderate* Negative Negative   < > Negative Trace* Negative   URINEPH 6.5 7.5* 7.5* 5.5   < > 6.0 6.0 6.0   PROTEIN Negative Negative Negative Negative   < > Negative Negative Trace*   UROBILINOGEN  --   --   --   --   --  1.0 0.2 2.0*   NITRITE Negative Negative Negative Negative   < > Negative Negative Negative CORRECTED ON 03/30 AT 0928: PREVIOUSLY REPORTED AS Positive   LEUKEST Small* Small* Small* Negative   < > Trace* Negative Negative   RBCU 3* 164* 1 0   < > O - 2 O - 2 O - 2   WBCU 5 13* 13* <1   < > O - 2 O - 2 O - 2    < > = values in this interval not displayed.     No lab results found.  PTH  No lab results found.  IRON STUDIES  Recent Labs   Lab Test 02/25/16  1630 02/13/11  0709   IRON 51 147   * 229*   IRONSAT 28 64*   JOAQUIN 1,306* 1391*     MELD-Na score: 33 at 3/14/2019  6:02 AM  MELD score: 33 at 3/14/2019  6:02 AM  Calculated from:  Serum Creatinine: 1.37 mg/dL at 3/14/2019  6:02 AM  Serum Sodium: 141 mmol/L (Rounded to 137 mmol/L) at 3/14/2019  6:02 AM  Total Bilirubin: 40.2 mg/dL at 3/14/2019  6:02 AM  INR(ratio): 2.37 at 3/14/2019  6:02 AM  Age: 53 years    IMAGING:  Reviewed.   I, catalina Valencia patient on *03/14/2019 with Dr. Ellis and agree with the findings  and plan of care as documented in the note.

## 2019-03-14 NOTE — PROVIDER NOTIFICATION
Notified Resident at 0040 AM regarding pt's confusion.      Spoke with:  EMANUEL Garcia MD    Orders were obtained.    Comments: MD notified of pt's confusion with [place] Stage 1 & ordered prn Lactulose for HE protocol. Pt. given prn Lactulose. Pt. disoriented to time & place & reoriented, bed alarm on.

## 2019-03-15 LAB
ALBUMIN SERPL-MCNC: 3.5 G/DL (ref 3.4–5)
ALP SERPL-CCNC: 184 U/L (ref 40–150)
ALT SERPL W P-5'-P-CCNC: 29 U/L (ref 0–70)
ANION GAP SERPL CALCULATED.3IONS-SCNC: 8 MMOL/L (ref 3–14)
AST SERPL W P-5'-P-CCNC: 86 U/L (ref 0–45)
BILIRUB SERPL-MCNC: 44.2 MG/DL (ref 0.2–1.3)
BUN SERPL-MCNC: 21 MG/DL (ref 7–30)
CALCIUM SERPL-MCNC: 9 MG/DL (ref 8.5–10.1)
CHLORIDE SERPL-SCNC: 121 MMOL/L (ref 94–109)
CO2 SERPL-SCNC: 14 MMOL/L (ref 20–32)
CREAT SERPL-MCNC: 1.21 MG/DL (ref 0.66–1.25)
ERYTHROCYTE [DISTWIDTH] IN BLOOD BY AUTOMATED COUNT: 18.2 % (ref 10–15)
GFR SERPL CREATININE-BSD FRML MDRD: 67 ML/MIN/{1.73_M2}
GLUCOSE SERPL-MCNC: 116 MG/DL (ref 70–99)
HCT VFR BLD AUTO: 27.8 % (ref 40–53)
HGB BLD-MCNC: 9.2 G/DL (ref 13.3–17.7)
INR PPP: 1.98 (ref 0.86–1.14)
MCH RBC QN AUTO: 35.9 PG (ref 26.5–33)
MCHC RBC AUTO-ENTMCNC: 33.1 G/DL (ref 31.5–36.5)
MCV RBC AUTO: 109 FL (ref 78–100)
PLATELET # BLD AUTO: 61 10E9/L (ref 150–450)
POTASSIUM SERPL-SCNC: 3.6 MMOL/L (ref 3.4–5.3)
PROT SERPL-MCNC: 6 G/DL (ref 6.8–8.8)
RBC # BLD AUTO: 2.56 10E12/L (ref 4.4–5.9)
SODIUM SERPL-SCNC: 144 MMOL/L (ref 133–144)
WBC # BLD AUTO: 5.8 10E9/L (ref 4–11)

## 2019-03-15 PROCEDURE — 25000132 ZZH RX MED GY IP 250 OP 250 PS 637: Performed by: STUDENT IN AN ORGANIZED HEALTH CARE EDUCATION/TRAINING PROGRAM

## 2019-03-15 PROCEDURE — 80053 COMPREHEN METABOLIC PANEL: CPT

## 2019-03-15 PROCEDURE — 25000132 ZZH RX MED GY IP 250 OP 250 PS 637

## 2019-03-15 PROCEDURE — 99233 SBSQ HOSP IP/OBS HIGH 50: CPT | Mod: GC | Performed by: STUDENT IN AN ORGANIZED HEALTH CARE EDUCATION/TRAINING PROGRAM

## 2019-03-15 PROCEDURE — 25000128 H RX IP 250 OP 636: Performed by: STUDENT IN AN ORGANIZED HEALTH CARE EDUCATION/TRAINING PROGRAM

## 2019-03-15 PROCEDURE — 12000026 ZZH R&B TRANSPLANT

## 2019-03-15 PROCEDURE — 85610 PROTHROMBIN TIME: CPT

## 2019-03-15 PROCEDURE — 25800030 ZZH RX IP 258 OP 636: Performed by: STUDENT IN AN ORGANIZED HEALTH CARE EDUCATION/TRAINING PROGRAM

## 2019-03-15 PROCEDURE — 85027 COMPLETE CBC AUTOMATED: CPT

## 2019-03-15 PROCEDURE — 36415 COLL VENOUS BLD VENIPUNCTURE: CPT

## 2019-03-15 RX ADMIN — POTASSIUM CHLORIDE 40 MEQ: 750 TABLET, EXTENDED RELEASE ORAL at 07:59

## 2019-03-15 RX ADMIN — THERA TABS 1 TABLET: TAB at 07:59

## 2019-03-15 RX ADMIN — RIFAXIMIN 550 MG: 550 TABLET ORAL at 07:59

## 2019-03-15 RX ADMIN — LACTULOSE 20 G: 10 POWDER, FOR SOLUTION ORAL at 07:59

## 2019-03-15 RX ADMIN — MELATONIN TAB 3 MG 3 MG: 3 TAB at 21:56

## 2019-03-15 RX ADMIN — POTASSIUM CHLORIDE 20 MEQ: 750 TABLET, EXTENDED RELEASE ORAL at 17:26

## 2019-03-15 RX ADMIN — THIAMINE HYDROCHLORIDE 500 MG: 100 INJECTION, SOLUTION INTRAMUSCULAR; INTRAVENOUS at 07:59

## 2019-03-15 RX ADMIN — THIAMINE HYDROCHLORIDE 500 MG: 100 INJECTION, SOLUTION INTRAMUSCULAR; INTRAVENOUS at 17:28

## 2019-03-15 RX ADMIN — SODIUM BICARBONATE 650 MG TABLET 650 MG: at 07:59

## 2019-03-15 RX ADMIN — THIAMINE HYDROCHLORIDE 500 MG: 100 INJECTION, SOLUTION INTRAMUSCULAR; INTRAVENOUS at 12:37

## 2019-03-15 RX ADMIN — PANTOPRAZOLE SODIUM 40 MG: 40 TABLET, DELAYED RELEASE ORAL at 07:59

## 2019-03-15 RX ADMIN — POTASSIUM CHLORIDE 40 MEQ: 750 TABLET, EXTENDED RELEASE ORAL at 19:55

## 2019-03-15 RX ADMIN — FOLIC ACID 1 MG: 1 TABLET ORAL at 07:59

## 2019-03-15 RX ADMIN — PREDNISOLONE 40 MG: 5 TABLET ORAL at 07:58

## 2019-03-15 RX ADMIN — RIFAXIMIN 550 MG: 550 TABLET ORAL at 19:55

## 2019-03-15 RX ADMIN — SODIUM BICARBONATE 650 MG TABLET 650 MG: at 19:55

## 2019-03-15 RX ADMIN — LACTULOSE 20 G: 10 SOLUTION ORAL at 00:11

## 2019-03-15 ASSESSMENT — ACTIVITIES OF DAILY LIVING (ADL)
ADLS_ACUITY_SCORE: 14
ADLS_ACUITY_SCORE: 15
ADLS_ACUITY_SCORE: 15
ADLS_ACUITY_SCORE: 12
ADLS_ACUITY_SCORE: 14
ADLS_ACUITY_SCORE: 15

## 2019-03-15 ASSESSMENT — MIFFLIN-ST. JEOR: SCORE: 1579.71

## 2019-03-15 NOTE — PLAN OF CARE
Vitals:  Vitals stable, afebrile.  Endocrine: Glucose 116 with labs.  Labs: Increased bilirubin, stable creatinine, Bicarb 13.  Pain: Patient denies pain.  PRN's: n/a  Diet: 2 GM Na + 2L fluid restriction, 1330cc since midnight,  improving appetite.  LDA: Right PIV, saline locked between use.  GI: Diarrhea with scheduled lactulose, 2 small watery stools this shift. Lactulose given this morning, held afternoon dose. Patient has hemorrhoids, some bleeding noted with wiping but he declined medications or interventions.  : Voiding icteric urine in urinal, or mixed with stool in collection hat.  Skin: Jaundiced skin, intact otherwise.  Neuro: Mildly confused this morning, improving mentation this afternoon. Sitter DC'd, VPM initiated. Patient is confused on the year but has improved on additions and subtractions, has used the call light appropriately most recently. Bed and chair alarm in use.  Mobility: Minimal stand by assist, declines staff presence when in the bathroom.  Education: Using call light when out of bed or chair.  Plan: Monitor mental status changes, patient safety. His wife will be here this afternoon.

## 2019-03-15 NOTE — PROVIDER NOTIFICATION
Pt CIWA score 11 due to agitation and tremors.   No PRN available for CIWA. PRN lactulose given for [place] protocol Stage 1.   Maroon cross cover notified of patient findings/behaviors.   Pt has since calmed and trying to sleep.   Will continue to monitor closely.

## 2019-03-15 NOTE — PLAN OF CARE
VS: HTN at times secondary to agitation; afebrile; on RA  Mental Status: A&O 2-4; intermittent confusion, agitation; [place] and CIWA protocol continue   Cardiac: WNL; denies chest pain   Respiratory: LS clear, diminished in bases, bilaterally; denies SOB  GI/: voiding adequate amount urine; measured. Abdomen distended, + hyperactive bowel tones, diarrhea secondary to scheduled and PRN lactulose; denies nausea   Pain: denies pain   Diet: tolerating 2g sodium diet; 2L fluid restriction - pt tolerating diet    Mobility: Up SBA; sitter at bedside   Treatment: scheduled and PRN lactulose; thiamine replacement; sitter for behaviors   DC plan: TBD once improved AMBER and improved liver function per MD note

## 2019-03-15 NOTE — PROGRESS NOTES
"[facility]    Hepatology Follow-up    CC: Dizziness                  AMS     Dx: Decompensated alcoholic cirrhosis/alcoholic hepatitis                   HE                   Resolving AMBER    Subjective:  Alert and oriented, denies abd pain, N/V, fevers or chills. Tolerating PO  Medications  Current Facility-Administered Medications   Medication Dose Route Frequency     folic acid  1 mg Oral Daily     lactulose  20 g Oral 4x Daily     multivitamin, therapeutic  1 tablet Oral Daily     pantoprazole  40 mg Oral QAM AC     potassium chloride  40 mEq Oral BID     prednisoLONE  40 mg Oral Daily     promethazine  12.5 mg Intravenous Once     rifaximin  550 mg Oral BID     sodium bicarbonate  650 mg Oral BID     sodium chloride (PF)  3 mL Intracatheter Q8H     thiamine  500 mg Intravenous TID       Review of systems  A 10-point review of systems was negative.    Examination  /63 (BP Location: Left arm)   Pulse 98   Temp 98.3  F (36.8  C) (Oral)   Resp 18   Ht 1.778 m (5' 10\")   Wt 73.8 kg (162 lb 12.8 oz)   SpO2 99%   BMI 23.36 kg/m      Intake/Output Summary (Last 24 hours) at 3/11/2019 1050  Last data filed at 3/11/2019 1038  Gross per 24 hour   Intake 500 ml   Output 901 ml   Net -401 ml       Gen- NAD, AAOx3, ictericNAD, A+Ox3, icteric  CVS- RRR  RS- CTA  Abd- Soft, ND, NT, +BS  Extr-No edema  Neuro- No asterexis  Skin- Jaundice      Laboratory  Lab Results   Component Value Date     03/11/2019    POTASSIUM 2.5 03/11/2019    CHLORIDE 113 03/11/2019    CO2 20 03/11/2019    BUN 14 03/11/2019    CR 1.44 03/11/2019       Lab Results   Component Value Date    BILITOTAL 26.8 03/11/2019    ALT 28 03/11/2019     03/11/2019    ALKPHOS 214 03/11/2019       Lab Results   Component Value Date    WBC 3.7 03/11/2019    HGB 9.2 03/11/2019     03/11/2019    PLT 42 03/11/2019       Lab Results   Component Value Date    INR 2.25 03/11/2019     MELD-Na score: 30 at 3/15/2019  " 6:36 AM  MELD score: 30 at 3/15/2019  6:36 AM  Calculated from:  Serum Creatinine: 1.21 mg/dL at 3/15/2019  6:36 AM  Serum Sodium: 144 mmol/L (Rounded to 137 mmol/L) at 3/15/2019  6:36 AM  Total Bilirubin: 44.2 mg/dL at 3/15/2019  6:36 AM  INR(ratio): 1.98 at 3/15/2019  6:36 AM  Age: 53 years    Assessment  53 year old male with decompensated alcoholic cirrhosis complicated with ascites s/p TIPS, HE and EV who admitted with dizziness and encephalopathy in setting of relapsed alcohol use.   On prednisolone for AH, noted gradual improvement in renal function and increasing Na levels with calculated FWD of 1.3 L     Recommendations  - Continue prednisolone 40 mg daily  - Replace free water deficit with continuous D5 100 ml/hr for 24 hours   - Continue with rifaximin and lactulose, avoid over-titration of lactulose considering current FWD  - Protein-calorie supplementation     Plan discussed with Dr. Bales, Hepatology staff     Ulisses Briggs MD  Hepatology  #[phone]

## 2019-03-15 NOTE — PROGRESS NOTES
Nephrology Progress Note  March 15, 2019      ASSESSMENT AND RECOMMENDATIONS:   Ivan Bell is a 53 year old male admitted on 3/10/2019. He has a PMH of cirrhosis s/p TIPS 6/2018 with prior complications of ascites, variceal bleed, esophageal varices s/p banding in 2018, and alcohol use disorder who came to the hospital with nausea, jaundice, and lightheadedness in the setting of recent alcohol relapse; he was found to to have AMBER with creatinine of 1.68 on admission and hypokalemia with K of 2.1 on admission. EKG was significant for flat T waves. He received over 330 mEq of potassium, bringing K up from 2.1 to 3.7. Most recent K was 3.5. Also showing non-gap metabolic acidosis with hypocapnia, suggestive of type II RTA.      Recommendations:   - 1.5 L Fluid Restriction  - Continue K+ and Bicarb Replacement   - Standing daily weights     # AMBER   Patient likely had a prerenal AMBER which converted to ATN from decreased PO intake due to nausea and increased stool output after restarting lactulose. His creatinine on admission was 1.68, up from his baseline of about 1.1. AMBER improved with volume (received 75 g of albumin x 3 with improvement in creatinine).   --  At this time, renal function remains relatively stable with Crt ~1.3. He is non oliguric and his mental status is improving as well. Additionally, weight is down and he is breathing comf, would not start diuretics at this time but could be started at a later date outpatient when renal function is consistently stable. At this point, recommend continued supportive cares and daily weights.    # Hypokalemia   # Type II renal tubular acidosis   Potassium remains stable and bicarbonate improved as well. He continue to have ongoing loss in the stool. Agree with ongoing replacement of K+ and Bicarb.     Recommendations were communicated to primary team.     Seen and discussed with Dr. Patino.    Chi Ellis MD   [id number]    SUBJECTIVE:  Nursing notes reviewed.  "Improved confusion, sitter discontinued. He is not in distress, and denies any major issues. Enjoying television. Still having stool output and making urine. No fevers, chest pain.       Current Meds    folic acid  1 mg Oral Daily     lactulose  20 g Oral 4x Daily     multivitamin, therapeutic  1 tablet Oral Daily     pantoprazole  40 mg Oral QAM AC     potassium chloride  40 mEq Oral BID     prednisoLONE  40 mg Oral Daily     promethazine  12.5 mg Intravenous Once     rifaximin  550 mg Oral BID     sodium bicarbonate  650 mg Oral BID     sodium chloride (PF)  3 mL Intracatheter Q8H     thiamine  500 mg Intravenous TID     Infusion Meds    - MEDICATION INSTRUCTIONS -         ALLERGIES:    Allergies   Allergen Reactions     Benadryl [Diphenhydramine] Other (See Comments)     Delirium (visual and auditory hallucinations)     Oxycodone Other (See Comments)     Delirium and constipation       REVIEW OF SYSTEMS:  A comprehensive of systems was negative except as noted above.      PHYSICAL EXAM:   Temp  Av.9  F (37.2  C)  Min: 97.9  F (36.6  C)  Max: 100  F (37.8  C)      Pulse  Av  Min: 86  Max: 96 Resp  Av  Min: 14  Max: 21  SpO2  Av.9 %  Min: 90 %  Max: 100 %       /73 (BP Location: Left arm)   Pulse 98   Temp 98  F (36.7  C) (Oral)   Resp 18   Ht 1.778 m (5' 10\")   Wt 73.8 kg (162 lb 12.8 oz)   SpO2 96%   BMI 23.36 kg/m     Date 19 0700 - 19 0659   Shift 7824-2797 1095-8961 0873-3527 24 Hour Total   INTAKE   P.O. 320   320   Shift Total(mL/kg) 320(4.21)   320(4.21)   OUTPUT   Urine 200   200   Shift Total(mL/kg) 200(2.63)   200(2.63)   Weight (kg) 76.02 76.02 76.02 76.02      Admit Weight: 74.8 kg (165 lb)     GENERAL APPEARANCE: Up in chair, NAD  EYES: Positive for scleral icterus, pupils equal  Pulmonary: Air entry bilaterally, Faint crackles auscultated. Symmetric chest rise.   CV: Regular rhythm, normal rate  GI: Distended, soft, nontender, normal bowel sounds  MSK: No " LE edema. Distal pulses intact bilaterally.   : No kenney catheter in place   SKIN: Positive for jaundice   NEURO: Alert, answers Qs appropriately. Per RN report, still with intermittent confusion.     LABS:   CMP  Recent Labs   Lab 03/15/19  0636 03/14/19  0602 03/13/19  0600 03/13/19  0302  03/12/19  0610  03/11/19  0614  03/10/19  0642    141 140  --   --  142  --  141  --  135   POTASSIUM 3.6 3.6 3.5 3.5   < > 3.0*   < > 2.5*   < > 2.1*   CHLORIDE 121* 116* 112*  --   --  117*  --  113*  --  102   CO2 14* 12* 14*  --   --  16*  --  20  --  20   ANIONGAP 8 13 14  --   --  9  --  8  --  14   * 156* 90  --   --  85  --  82  --  113*   BUN 21 18 16  --   --  13  --  14  --  18   CR 1.21 1.37* 1.41*  --   --  1.36*  --  1.44*  --  1.68*   GFRESTIMATED 67 57* 55*  --   --  57*  --  54*  --  45*   GFRESTBLACK 77 67 64  --   --  66  --  63  --  52*   JULISSA 9.0 8.9 8.8  --   --  8.4*  --  8.0*  --  8.5   MAG  --  2.1  --   --   --   --   --  2.4*  --  2.1   PROTTOTAL 6.0* 5.2* 5.5*  --   --  4.9*  --  4.6*  --  5.6*   ALBUMIN 3.5 3.3* 3.5  --   --  2.8*  --  2.3*  --  1.9*   BILITOTAL 44.2* 40.2* 36.3* 35.5*  --  30.8*  --  26.8*  --  28.6*   ALKPHOS 184* 144 155*  --   --  174*  --  214*  --  329*   AST 86* 65* 77*  --   --  95*  --  115*  --  171*   ALT 29 24 22  --   --  26  --  28  --  41    < > = values in this interval not displayed.     CBC  Recent Labs   Lab 03/15/19  0636 03/14/19  0602 03/13/19  0600 03/12/19  0610   HGB 9.2* 8.2* 8.5* 8.5*   WBC 5.8 4.6 6.2 3.8*   RBC 2.56* 2.31* 2.37* 2.42*   HCT 27.8* 24.7* 25.1* 25.2*   * 107* 106* 104*   MCH 35.9* 35.5* 35.9* 35.1*   MCHC 33.1 33.2 33.9 33.7   RDW 18.2* 18.3* 18.6* 18.7*   PLT 61* 41* 46* 40*     INR  Recent Labs   Lab 03/15/19  0636 03/14/19  0602 03/13/19  0600 03/12/19  0610   INR 1.98* 2.37* 2.40* 2.31*     ABG  Recent Labs   Lab 03/14/19  0602   O2PER 21.0      URINE STUDIES  Recent Labs   Lab Test 03/14/19  1300 03/11/19  1040  06/09/18 2003 03/09/16  0017  12/27/13  0918 12/18/12  1642 03/30/11  0830   COLOR Dark Yellow Dark Yellow Yellow Dark Yellow   < > Yellow Yellow Yellow   APPEARANCE Slightly Cloudy Clear Clear Slightly Cloudy   < > Clear Clear Clear   URINEGLC Negative Negative Negative Negative   < > Negative Negative Negative   URINEBILI Large* Large* Negative Large   This is an unconfirmed screening test result. A positive result may be false.  *   < > Negative Negative Small*   URINEKETONE Negative Negative Negative Negative   < > Negative 15* Negative   SG 1.009 1.007 1.008 1.010   < > 1.020 1.015 1.020   UBLD Small* Moderate* Negative Negative   < > Negative Trace* Negative   URINEPH 6.5 7.5* 7.5* 5.5   < > 6.0 6.0 6.0   PROTEIN Negative Negative Negative Negative   < > Negative Negative Trace*   UROBILINOGEN  --   --   --   --   --  1.0 0.2 2.0*   NITRITE Negative Negative Negative Negative   < > Negative Negative Negative CORRECTED ON 03/30 AT 0928: PREVIOUSLY REPORTED AS Positive   LEUKEST Small* Small* Small* Negative   < > Trace* Negative Negative   RBCU 3* 164* 1 0   < > O - 2 O - 2 O - 2   WBCU 5 13* 13* <1   < > O - 2 O - 2 O - 2    < > = values in this interval not displayed.     No lab results found.  PTH  No lab results found.  IRON STUDIES  Recent Labs   Lab Test 02/25/16  1630 02/13/11  0709   IRON 51 147   * 229*   IRONSAT 28 64*   JOAQUIN 1,306* 1391*     MELD-Na score: 30 at 3/15/2019  6:36 AM  MELD score: 30 at 3/15/2019  6:36 AM  Calculated from:  Serum Creatinine: 1.21 mg/dL at 3/15/2019  6:36 AM  Serum Sodium: 144 mmol/L (Rounded to 137 mmol/L) at 3/15/2019  6:36 AM  Total Bilirubin: 44.2 mg/dL at 3/15/2019  6:36 AM  INR(ratio): 1.98 at 3/15/2019  6:36 AM  Age: 53 years    IMAGING:  Reviewed.

## 2019-03-15 NOTE — PROGRESS NOTES
[facility], [place]    History and Physical - Mardarcy 2 Service        Date of Admission:  3/10/2019    Assessment & Plan   Ivan Bell is a 53 year old male with history of NAFLD cirrhosis s/p TIPS who presents with superimposed EtOH hepaitis w/o ascites in setting of relapsed alcohol use and AMBER. Patient's confusion markedly improved today. MELD labs improved overall with decrease in total bilirubin today.     TODAY  - Discontinued potassium replacement protocol, continue scheduled potassium   - Sodium bicarb increased   - Continue prednisolone     # NAFLD cirrhosis  # Alcohol hepatis, superimposed  # Alcohol use disorder  # MELD 33, worsening total bilirubin   Patient sober for 18 months but then started drinking again in January. Presented to the hospital with laboratory values concerning for EtOH hepatitis. Patient's Maddrey score was 88 but no steroids were started. Abdominal ultrasound on admission showed no evidence of thrombosis or ascites but did show increased stent velocities concerning for stenosis. Abdominal ultrasound repeated 3/13 with no significant change. Patient with confusion during hospitalization (infectious workup and CT head negative), which has markedly improved today. Patient's MELD 30 with decrease in total bilirubin today.   - Hepatology following, appreciate recs  - 40mg Prednisolone for EtOH hepatitis for total of 7 days. At that time, Lille score will be calculated and further steroid recommendations will be made at that time.   - 500mg IV Thiamine TID for possible Wernicke's encephalopathy    - Lactulose per [facility] protocol   - Continue folic acid and MV    - Daily MELD labs   - Daily weights, 2L fluid restriction, 2g sodium restricted diet  - PPI once daily     MELD-Na score: 30 at 3/15/2019  6:36 AM  MELD score: 30 at 3/15/2019  6:36 AM  Calculated from:  Serum Creatinine: 1.21 mg/dL at 3/15/2019  6:36 AM  Serum Sodium: 144 mmol/L (Rounded to  137 mmol/L) at 3/15/2019  6:36 AM  Total Bilirubin: 44.2 mg/dL at 3/15/2019  6:36 AM  INR(ratio): 1.98 at 3/15/2019  6:36 AM  Age: 53 years    Consults  - Chem dep consult, appreciate recs   - Hepatology consult, appreciate recs    Treatment  - [facility] protocol for lactulose mgmt   - Rifaxamin    Follow up  - TIPS and EtOH hepatitis follow-up as outpatient   - Outpatient chem dep     #Hypokalemia  #RTA Type II    #NAGMA, improving   Patient requiring ~330mEq of potassium upon admission to bring potassium from 2.1 --> 3.7. Urine potassium of 40 and low bicarbonate. Thought to be secondary to RTA Type II in setting of pre-renal injury --> ATN. Sodium bicarbonate 14 --> 11 today.   - Renal consult, appreciate recs  - 40mEq potassium BID   - Potassium repletion protocol discontinued   - Sodium bicarb increased to 1300mg TID   - Daily BMP    # AMBER, improving   Likely pre-renal in setting of poor po intake and high EtOH intake prior to admission. Responded well to albumin challenge. Creatinine improved this AM.   - Continue to monitor     # Nicotine use disorder  Has 60 year pack history, quit smoking in 1994 but he still uses chewing tobacco. He is interested in quitting nicotine altogether and understands cancer risk associated with smokeless tobacco. Not interested in any nicotine replacement at this time.      Diet: 2g restricted sodium diet, 2L fluid restricted diet   Fluids: None  DVT Prophylaxis: VTE Prophylaxis contraindicated due to elevated INR  Blue Catheter: not present  Code Status: Full, discussed with patient and wife    Disposition Plan   Expected discharge: 1-2 days recommended to prior living arrangement once treatment of EtOH hepatitis is more clear.   Entered: Leonarda Cabrera MD 03/15/2019, 11:24 AM       The patient's care was discussed with the attending physician, Dr. Patel.     DO Constance Levine 2 Service  [facility], [place]  Pager: [phone]  Please see  sticky note for cross cover information  ______________________________________________________________________    Subjective  No acute events overnight. Patient sitting up in bed today asking when he can go home. States he had about 3 bowel movements overnight and slept well. Denies cough, SOB, chest pain, abdominal pain.     Physical Exam   Vital Signs: Temp: 98  F (36.7  C) Temp src: Oral BP: 137/73 Pulse: 98 Heart Rate: 88 Resp: 18 SpO2: 96 % O2 Device: None (Room air)    Weight: 171 lbs 0 oz    General Appearance: Jaundiced, alert and oriented x 3.   Eyes: Scleral icterus, PERRLA, EOMI  HEENT: MMM, mild pharyngeal erythema    Respiratory: Mild bibasilar crackles, improved overall. Breathing comfortably on RA.   Cardiovascular: RRR, systolic murmur  GI: Nontender, slightly distended   Skin: Jaundice, no rashes on limited exam, no telangiectasias, spider angiomas.   Neurologic: no asterixis, nystagmus present, CN II-XII intact, sensation intact bilaterally, 5/5 muscle strength in UE and LE     Data   Data reviewed today: I reviewed all medications, new labs and imaging results over the last 24 hours. I personally reviewed the EKG tracing showing fairly flat t waves.    Recent Labs   Lab 03/15/19  0636 03/14/19  0602 03/13/19  0600  03/10/19  0642   WBC 5.8 4.6 6.2   < > 8.3   HGB 9.2* 8.2* 8.5*   < > 12.8*   * 107* 106*   < > 98   PLT 61* 41* 46*   < > 80*   INR 1.98* 2.37* 2.40*   < > 2.09*    141 140   < > 135   POTASSIUM 3.6 3.6 3.5   < > 2.1*   CHLORIDE 121* 116* 112*   < > 102   CO2 14* 12* 14*   < > 20   BUN 21 18 16   < > 18   CR 1.21 1.37* 1.41*   < > 1.68*   ANIONGAP 8 13 14   < > 14   JULISSA 9.0 8.9 8.8   < > 8.5   * 156* 90   < > 113*   ALBUMIN 3.5 3.3* 3.5   < > 1.9*   PROTTOTAL 6.0* 5.2* 5.5*   < > 5.6*   BILITOTAL 44.2* 40.2* 36.3*   < > 28.6*   ALKPHOS 184* 144 155*   < > 329*   ALT 29 24 22   < > 41   AST 86* 65* 77*   < > 171*   LIPASE  --   --   --   --  252    < > = values in  this interval not displayed.

## 2019-03-16 LAB
ALBUMIN SERPL-MCNC: 3 G/DL (ref 3.4–5)
ALP SERPL-CCNC: 188 U/L (ref 40–150)
ALT SERPL W P-5'-P-CCNC: 28 U/L (ref 0–70)
ANION GAP SERPL CALCULATED.3IONS-SCNC: 14 MMOL/L (ref 3–14)
AST SERPL W P-5'-P-CCNC: 88 U/L (ref 0–45)
BACTERIA SPEC CULT: NO GROWTH
BILIRUB SERPL-MCNC: 38.3 MG/DL (ref 0.2–1.3)
BUN SERPL-MCNC: 20 MG/DL (ref 7–30)
CALCIUM SERPL-MCNC: 8.2 MG/DL (ref 8.5–10.1)
CHLORIDE SERPL-SCNC: 117 MMOL/L (ref 94–109)
CO2 SERPL-SCNC: 11 MMOL/L (ref 20–32)
CREAT SERPL-MCNC: 1.12 MG/DL (ref 0.66–1.25)
ERYTHROCYTE [DISTWIDTH] IN BLOOD BY AUTOMATED COUNT: 18.5 % (ref 10–15)
GFR SERPL CREATININE-BSD FRML MDRD: 73 ML/MIN/{1.73_M2}
GLUCOSE SERPL-MCNC: 109 MG/DL (ref 70–99)
HCT VFR BLD AUTO: 26 % (ref 40–53)
HGB BLD-MCNC: 8.5 G/DL (ref 13.3–17.7)
INR PPP: 2.01 (ref 0.86–1.14)
Lab: NORMAL
MCH RBC QN AUTO: 36 PG (ref 26.5–33)
MCHC RBC AUTO-ENTMCNC: 32.7 G/DL (ref 31.5–36.5)
MCV RBC AUTO: 110 FL (ref 78–100)
PLATELET # BLD AUTO: 59 10E9/L (ref 150–450)
POTASSIUM SERPL-SCNC: 3.9 MMOL/L (ref 3.4–5.3)
PROT SERPL-MCNC: 5.6 G/DL (ref 6.8–8.8)
RBC # BLD AUTO: 2.36 10E12/L (ref 4.4–5.9)
SODIUM SERPL-SCNC: 142 MMOL/L (ref 133–144)
SPECIMEN SOURCE: NORMAL
WBC # BLD AUTO: 5.8 10E9/L (ref 4–11)

## 2019-03-16 PROCEDURE — 25000132 ZZH RX MED GY IP 250 OP 250 PS 637: Performed by: STUDENT IN AN ORGANIZED HEALTH CARE EDUCATION/TRAINING PROGRAM

## 2019-03-16 PROCEDURE — 40000556 ZZH STATISTIC PERIPHERAL IV START W US GUIDANCE

## 2019-03-16 PROCEDURE — 99233 SBSQ HOSP IP/OBS HIGH 50: CPT | Performed by: STUDENT IN AN ORGANIZED HEALTH CARE EDUCATION/TRAINING PROGRAM

## 2019-03-16 PROCEDURE — 25800030 ZZH RX IP 258 OP 636: Performed by: STUDENT IN AN ORGANIZED HEALTH CARE EDUCATION/TRAINING PROGRAM

## 2019-03-16 PROCEDURE — 12000026 ZZH R&B TRANSPLANT

## 2019-03-16 PROCEDURE — 85027 COMPLETE CBC AUTOMATED: CPT | Performed by: STUDENT IN AN ORGANIZED HEALTH CARE EDUCATION/TRAINING PROGRAM

## 2019-03-16 PROCEDURE — 25000132 ZZH RX MED GY IP 250 OP 250 PS 637: Performed by: INTERNAL MEDICINE

## 2019-03-16 PROCEDURE — 25000128 H RX IP 250 OP 636: Performed by: STUDENT IN AN ORGANIZED HEALTH CARE EDUCATION/TRAINING PROGRAM

## 2019-03-16 PROCEDURE — 85610 PROTHROMBIN TIME: CPT | Performed by: STUDENT IN AN ORGANIZED HEALTH CARE EDUCATION/TRAINING PROGRAM

## 2019-03-16 PROCEDURE — 36415 COLL VENOUS BLD VENIPUNCTURE: CPT | Performed by: STUDENT IN AN ORGANIZED HEALTH CARE EDUCATION/TRAINING PROGRAM

## 2019-03-16 PROCEDURE — 80053 COMPREHEN METABOLIC PANEL: CPT | Performed by: STUDENT IN AN ORGANIZED HEALTH CARE EDUCATION/TRAINING PROGRAM

## 2019-03-16 PROCEDURE — 25000132 ZZH RX MED GY IP 250 OP 250 PS 637

## 2019-03-16 RX ORDER — SODIUM BICARBONATE 650 MG/1
1300 TABLET ORAL 3 TIMES DAILY
Status: DISCONTINUED | OUTPATIENT
Start: 2019-03-16 | End: 2019-03-22 | Stop reason: HOSPADM

## 2019-03-16 RX ADMIN — RIFAXIMIN 550 MG: 550 TABLET ORAL at 20:33

## 2019-03-16 RX ADMIN — SODIUM BICARBONATE 650 MG TABLET 1300 MG: at 13:50

## 2019-03-16 RX ADMIN — SODIUM BICARBONATE 650 MG TABLET 1300 MG: at 07:52

## 2019-03-16 RX ADMIN — FOLIC ACID 1 MG: 1 TABLET ORAL at 07:52

## 2019-03-16 RX ADMIN — PREDNISOLONE 40 MG: 5 TABLET ORAL at 07:51

## 2019-03-16 RX ADMIN — LACTULOSE 20 G: 10 SOLUTION ORAL at 01:50

## 2019-03-16 RX ADMIN — RIFAXIMIN 550 MG: 550 TABLET ORAL at 07:52

## 2019-03-16 RX ADMIN — THIAMINE HYDROCHLORIDE 500 MG: 100 INJECTION, SOLUTION INTRAMUSCULAR; INTRAVENOUS at 07:50

## 2019-03-16 RX ADMIN — THIAMINE HYDROCHLORIDE 500 MG: 100 INJECTION, SOLUTION INTRAMUSCULAR; INTRAVENOUS at 17:23

## 2019-03-16 RX ADMIN — LACTULOSE 20 G: 10 SOLUTION ORAL at 06:20

## 2019-03-16 RX ADMIN — LACTULOSE 20 G: 10 POWDER, FOR SOLUTION ORAL at 12:42

## 2019-03-16 RX ADMIN — LACTULOSE 20 G: 10 SOLUTION ORAL at 04:31

## 2019-03-16 RX ADMIN — POTASSIUM CHLORIDE 40 MEQ: 750 TABLET, EXTENDED RELEASE ORAL at 07:52

## 2019-03-16 RX ADMIN — SODIUM BICARBONATE 650 MG TABLET 1300 MG: at 20:33

## 2019-03-16 RX ADMIN — LACTULOSE 20 G: 10 POWDER, FOR SOLUTION ORAL at 15:05

## 2019-03-16 RX ADMIN — THIAMINE HYDROCHLORIDE 500 MG: 100 INJECTION, SOLUTION INTRAMUSCULAR; INTRAVENOUS at 12:43

## 2019-03-16 RX ADMIN — PANTOPRAZOLE SODIUM 40 MG: 40 TABLET, DELAYED RELEASE ORAL at 07:52

## 2019-03-16 RX ADMIN — THERA TABS 1 TABLET: TAB at 07:52

## 2019-03-16 RX ADMIN — POTASSIUM CHLORIDE 40 MEQ: 750 TABLET, EXTENDED RELEASE ORAL at 20:33

## 2019-03-16 ASSESSMENT — ACTIVITIES OF DAILY LIVING (ADL)
ADLS_ACUITY_SCORE: 15
ADLS_ACUITY_SCORE: 14
ADLS_ACUITY_SCORE: 14
ADLS_ACUITY_SCORE: 15
ADLS_ACUITY_SCORE: 14
ADLS_ACUITY_SCORE: 14

## 2019-03-16 ASSESSMENT — MIFFLIN-ST. JEOR: SCORE: 1598.76

## 2019-03-16 NOTE — PLAN OF CARE
AVSS and denies pain.  Patient alert and oriented x4 this AM; however, patient appears to be a little bit more confused as day has progressed - bed alarm and VPM remain in place.  Patient up with standby assist, voiding, BMx4, and tolerating diet with good appetite.  Scheduled meds given as ordered.  Continue to monitor, treat as ordered, notify team with changes.

## 2019-03-16 NOTE — PLAN OF CARE
"/54   Pulse 98   Temp 98.9  F (37.2  C) (Oral)   Resp 28   Ht 1.778 m (5' 10\")   Wt 75.8 kg (167 lb)   SpO2 96%   BMI 23.96 kg/m  . Pt. tachypneic 28, AOVSS, on RA. Pt. denies pain or nausea. Right PIV saline locked. Pt. up with standby assist.   Voiding spontaneously, 3 stools this shift. Pt. remains confused to date. Bed alarm & VPM on. Pt. received prn Lactulose x3 per [facility] protocol. Pt. on 2gm NA+ with 2L fluid restriction & tolerating well. Pt. slept fair between cares. Call light within reach. Continue to monitor per POC & notify team with change in status.   "

## 2019-03-16 NOTE — PLAN OF CARE
[id number]: AVSS on RA. A&O x4, received scheduled lactulose. VPM in place and bed alarm activated. Denies pain and nausea. PIV saline locked. Voiding, not saving. Loose BM x2. Up w/ SBA. Plan for possible discharge tomorrow pending lab values. Will continue to monitor and update team w/ changes.

## 2019-03-17 ENCOUNTER — APPOINTMENT (OUTPATIENT)
Dept: PHYSICAL THERAPY | Facility: CLINIC | Age: 54
End: 2019-03-17
Payer: COMMERCIAL

## 2019-03-17 LAB
ALBUMIN SERPL-MCNC: 2.8 G/DL (ref 3.4–5)
ALP SERPL-CCNC: 225 U/L (ref 40–150)
ALT SERPL W P-5'-P-CCNC: 37 U/L (ref 0–70)
ANION GAP SERPL CALCULATED.3IONS-SCNC: 8 MMOL/L (ref 3–14)
AST SERPL W P-5'-P-CCNC: 104 U/L (ref 0–45)
BACTERIA SPEC CULT: NO GROWTH
BILIRUB SERPL-MCNC: 37.5 MG/DL (ref 0.2–1.3)
BUN SERPL-MCNC: 18 MG/DL (ref 7–30)
CALCIUM SERPL-MCNC: 8.2 MG/DL (ref 8.5–10.1)
CHLORIDE SERPL-SCNC: 120 MMOL/L (ref 94–109)
CO2 SERPL-SCNC: 15 MMOL/L (ref 20–32)
CREAT SERPL-MCNC: 1.1 MG/DL (ref 0.66–1.25)
ERYTHROCYTE [DISTWIDTH] IN BLOOD BY AUTOMATED COUNT: 18.6 % (ref 10–15)
GFR SERPL CREATININE-BSD FRML MDRD: 75 ML/MIN/{1.73_M2}
GLUCOSE SERPL-MCNC: 126 MG/DL (ref 70–99)
HCT VFR BLD AUTO: 27.7 % (ref 40–53)
HGB BLD-MCNC: 9 G/DL (ref 13.3–17.7)
INR PPP: 2.04 (ref 0.86–1.14)
Lab: NORMAL
MCH RBC QN AUTO: 36 PG (ref 26.5–33)
MCHC RBC AUTO-ENTMCNC: 32.5 G/DL (ref 31.5–36.5)
MCV RBC AUTO: 111 FL (ref 78–100)
PLATELET # BLD AUTO: 73 10E9/L (ref 150–450)
POTASSIUM SERPL-SCNC: 4 MMOL/L (ref 3.4–5.3)
PROT SERPL-MCNC: 4.9 G/DL (ref 6.8–8.8)
RBC # BLD AUTO: 2.5 10E12/L (ref 4.4–5.9)
SODIUM SERPL-SCNC: 134 MMOL/L (ref 133–144)
SODIUM SERPL-SCNC: 141 MMOL/L (ref 133–144)
SODIUM SERPL-SCNC: 144 MMOL/L (ref 133–144)
SPECIMEN SOURCE: NORMAL
WBC # BLD AUTO: 6.1 10E9/L (ref 4–11)

## 2019-03-17 PROCEDURE — 25000132 ZZH RX MED GY IP 250 OP 250 PS 637: Performed by: STUDENT IN AN ORGANIZED HEALTH CARE EDUCATION/TRAINING PROGRAM

## 2019-03-17 PROCEDURE — 85610 PROTHROMBIN TIME: CPT | Performed by: STUDENT IN AN ORGANIZED HEALTH CARE EDUCATION/TRAINING PROGRAM

## 2019-03-17 PROCEDURE — 99233 SBSQ HOSP IP/OBS HIGH 50: CPT | Mod: GC | Performed by: STUDENT IN AN ORGANIZED HEALTH CARE EDUCATION/TRAINING PROGRAM

## 2019-03-17 PROCEDURE — 25000128 H RX IP 250 OP 636: Performed by: STUDENT IN AN ORGANIZED HEALTH CARE EDUCATION/TRAINING PROGRAM

## 2019-03-17 PROCEDURE — 25800030 ZZH RX IP 258 OP 636: Performed by: STUDENT IN AN ORGANIZED HEALTH CARE EDUCATION/TRAINING PROGRAM

## 2019-03-17 PROCEDURE — 84295 ASSAY OF SERUM SODIUM: CPT | Performed by: STUDENT IN AN ORGANIZED HEALTH CARE EDUCATION/TRAINING PROGRAM

## 2019-03-17 PROCEDURE — 25000132 ZZH RX MED GY IP 250 OP 250 PS 637: Performed by: INTERNAL MEDICINE

## 2019-03-17 PROCEDURE — 85027 COMPLETE CBC AUTOMATED: CPT | Performed by: STUDENT IN AN ORGANIZED HEALTH CARE EDUCATION/TRAINING PROGRAM

## 2019-03-17 PROCEDURE — 12000026 ZZH R&B TRANSPLANT

## 2019-03-17 PROCEDURE — 80053 COMPREHEN METABOLIC PANEL: CPT | Performed by: STUDENT IN AN ORGANIZED HEALTH CARE EDUCATION/TRAINING PROGRAM

## 2019-03-17 PROCEDURE — 97116 GAIT TRAINING THERAPY: CPT | Mod: GP

## 2019-03-17 PROCEDURE — 36415 COLL VENOUS BLD VENIPUNCTURE: CPT | Performed by: STUDENT IN AN ORGANIZED HEALTH CARE EDUCATION/TRAINING PROGRAM

## 2019-03-17 PROCEDURE — 97161 PT EVAL LOW COMPLEX 20 MIN: CPT | Mod: GP

## 2019-03-17 PROCEDURE — 25000132 ZZH RX MED GY IP 250 OP 250 PS 637

## 2019-03-17 RX ORDER — LACTULOSE 10 G/10G
20 SOLUTION ORAL 3 TIMES DAILY
Status: DISCONTINUED | OUTPATIENT
Start: 2019-03-17 | End: 2019-03-22 | Stop reason: HOSPADM

## 2019-03-17 RX ORDER — DEXTROSE MONOHYDRATE 50 MG/ML
INJECTION, SOLUTION INTRAVENOUS CONTINUOUS
Status: DISCONTINUED | OUTPATIENT
Start: 2019-03-17 | End: 2019-03-17

## 2019-03-17 RX ADMIN — POTASSIUM CHLORIDE 40 MEQ: 750 TABLET, EXTENDED RELEASE ORAL at 21:09

## 2019-03-17 RX ADMIN — PANTOPRAZOLE SODIUM 40 MG: 40 TABLET, DELAYED RELEASE ORAL at 07:46

## 2019-03-17 RX ADMIN — THIAMINE HYDROCHLORIDE 500 MG: 100 INJECTION, SOLUTION INTRAMUSCULAR; INTRAVENOUS at 07:46

## 2019-03-17 RX ADMIN — LACTULOSE 20 G: 10 POWDER, FOR SOLUTION ORAL at 11:30

## 2019-03-17 RX ADMIN — DEXTROSE MONOHYDRATE: 50 INJECTION, SOLUTION INTRAVENOUS at 10:12

## 2019-03-17 RX ADMIN — RIFAXIMIN 550 MG: 550 TABLET ORAL at 21:09

## 2019-03-17 RX ADMIN — POTASSIUM CHLORIDE 40 MEQ: 750 TABLET, EXTENDED RELEASE ORAL at 07:47

## 2019-03-17 RX ADMIN — SODIUM BICARBONATE 650 MG TABLET 1300 MG: at 14:35

## 2019-03-17 RX ADMIN — THIAMINE HYDROCHLORIDE 500 MG: 100 INJECTION, SOLUTION INTRAMUSCULAR; INTRAVENOUS at 18:01

## 2019-03-17 RX ADMIN — PREDNISOLONE 40 MG: 5 TABLET ORAL at 07:46

## 2019-03-17 RX ADMIN — LACTULOSE 20 G: 10 POWDER, FOR SOLUTION ORAL at 16:03

## 2019-03-17 RX ADMIN — RIFAXIMIN 550 MG: 550 TABLET ORAL at 07:46

## 2019-03-17 RX ADMIN — SODIUM BICARBONATE 650 MG TABLET 1300 MG: at 07:46

## 2019-03-17 RX ADMIN — THERA TABS 1 TABLET: TAB at 07:46

## 2019-03-17 RX ADMIN — THIAMINE HYDROCHLORIDE 500 MG: 100 INJECTION, SOLUTION INTRAMUSCULAR; INTRAVENOUS at 11:26

## 2019-03-17 RX ADMIN — FOLIC ACID 1 MG: 1 TABLET ORAL at 07:46

## 2019-03-17 RX ADMIN — SODIUM BICARBONATE 650 MG TABLET 1300 MG: at 21:09

## 2019-03-17 RX ADMIN — LACTULOSE 20 G: 10 POWDER, FOR SOLUTION ORAL at 07:46

## 2019-03-17 ASSESSMENT — ACTIVITIES OF DAILY LIVING (ADL)
ADLS_ACUITY_SCORE: 14
ADLS_ACUITY_SCORE: 12
ADLS_ACUITY_SCORE: 14
ADLS_ACUITY_SCORE: 14

## 2019-03-17 ASSESSMENT — MIFFLIN-ST. JEOR: SCORE: 1594.22

## 2019-03-17 NOTE — PLAN OF CARE
AVSS and denies pain.  Patient alert and oriented to person, place, situation, and a little off on time, confusion appears to be intermittent at times, bed alarm and VPM remain in place.  Patient up with standby assist, voiding, BMx4, and tolerating diet with good appetite. Maintenance fluids initiated today to correct sodium level; sodium level rechecked at 1330 and awaiting results.  Scheduled meds given as ordered.  Continue to monitor, treat as ordered, notify team with changes.

## 2019-03-17 NOTE — PLAN OF CARE
Physical Therapy Discharge Summary    Reason for therapy discharge:    All goals and outcomes met, no further needs identified.    Progress towards therapy goal(s). See goals on Care Plan in [facility] electronic health record for goal details.  Goals met    Therapy recommendation(s):    Continued therapy is recommended.  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

## 2019-03-17 NOTE — PLAN OF CARE
"/62 (BP Location: Left arm)   Pulse 98   Temp 97.9  F (36.6  C) (Oral)   Resp 28   Ht 1.778 m (5' 10\")   Wt 75.3 kg (166 lb)   SpO2 99%   BMI 23.82 kg/m      [id number]. Tachypneic, no signs of distress. AOVSS on RA, afebrile. Denies pain and nausea. Tolerating a 2g sodium diet w/ 2L FR. R PIV is SL. Voiding, not saving. 1 loose BM overnight. Skin intact, jaundice. Up with SBA. BA and VPM in place, pt is impulsive and forgetful, but A&O this shift. Plan for potential discharge based on labs this am. Will continue to monitor and notify of any changes.   "

## 2019-03-17 NOTE — PROGRESS NOTES
[facility], [place]    History and Physical - Mardarcy 2 Service        Date of Admission:  3/10/2019    Assessment & Plan   Ivan Bell is a 53 year old male with history of NAFLD cirrhosis s/p TIPS who presents with superimposed EtOH hepaitis w/o ascites in setting of relapsed alcohol use and AMBER. Patient's confusion and total bilirubin improved today.   TODAY  - D5W started for sodium   - Fluid restriction discontinued   - PT/OT consult   - Continue prednisolone     # NAFLD cirrhosis  # Alcohol hepatis, superimposed  # Alcohol use disorder  # MELD 33, worsening total bilirubin   Patient with confusion during hospitalization (infectious workup and CT head negative), which has improved overall, however still confused at times. Patient's sodium borderline elevated (144), which could be contributing. Will start D5W today with q8h sodium checks. Continue prednisolone for alcoholic hepatitis, MELD labs improving.   - Hepatology following, appreciate recs  - PT/OT consult for safe discharge planning   - 40mg Prednisolone for EtOH hepatitis for total of 7 days. At that time, Lille score will be calculated and further steroid recommendations will be made at that time.   - d5W @100mL/hr  - q8h sodium check to avoid overcorrection   - 500mg IV Thiamine TID for possible Wernicke's encephalopathy    - Lactulose per [facility] protocol   - Continue folic acid and MV    - Daily MELD labs   - Daily weights, 2L fluid restriction, 2g sodium restricted diet  - PPI once daily     MELD-Na score: 29 at 3/17/2019  5:35 AM  MELD score: 29 at 3/17/2019  5:35 AM  Calculated from:  Serum Creatinine: 1.1 mg/dL at 3/17/2019  5:35 AM  Serum Sodium: 144 mmol/L (Rounded to 137 mmol/L) at 3/17/2019  5:35 AM  Total Bilirubin: 37.5 mg/dL at 3/17/2019  5:35 AM  INR(ratio): 2.04 at 3/17/2019  5:35 AM  Age: 53 years    Consults  - Chem dep consult, appreciate recs   - Hepatology consult, appreciate recs    Treatment  -  [facility] protocol for lactulose mgmt   - Rifaxamin    Follow up  - TIPS and EtOH hepatitis follow-up as outpatient   - Outpatient chem dep     #Hypokalemia  #RTA Type II    #NAGMA, improving   Patient requiring ~330mEq of potassium upon admission to bring potassium from 2.1 --> 3.7. Urine potassium of 40 and low bicarbonate. Thought to be secondary to RTA Type II in setting of pre-renal injury --> ATN. Potassium and bicarbonate improving with scheduled repletion.   - Renal consult, appreciate recs  - 40mEq potassium BID   - Sodium bicarb increased to 1300mg TID   - Daily BMP    # AMBER, improving   Likely pre-renal in setting of poor po intake and high EtOH intake prior to admission. Responded well to albumin challenge. Creatinine improved this AM.   - Continue to monitor     # Nicotine use disorder  Has 60 year pack history, quit smoking in 1994 but he still uses chewing tobacco. He is interested in quitting nicotine altogether and understands cancer risk associated with smokeless tobacco. Not interested in any nicotine replacement at this time.      Diet: 2g restricted sodium diet, 2L fluid restricted diet   Fluids: None  DVT Prophylaxis: VTE Prophylaxis contraindicated due to elevated INR  Blue Catheter: not present  Code Status: Full, discussed with patient and wife    Disposition Plan   Expected discharge: 1-2 days recommended to prior living arrangement once treatment of EtOH hepatitis is more clear.   Entered: Leonarda Cabrera MD 03/17/2019, 9:47 AM       The patient's care was discussed with the attending physician, Dr. Patel.     DO Constance Levine 2 Service  [facility], [place]  Pager: [phone]  Please see sticky note for cross cover information  ______________________________________________________________________    Subjective  No acute events overnight. Patient sitting up in bed today asking to go home. Wife at bedside. States she doesn't think patient is ready  to go home at this time. Thinks confusion is better but he's unsteady on his feet and time and still not back to his baseline mental status. Patient denies chest pain, cough, SOB, abdominal pain. Continues to have multiple loose bowel movements per day.     Physical Exam   Vital Signs: Temp: 97.7  F (36.5  C) Temp src: Oral BP: 121/69   Heart Rate: 78 Resp: 24 SpO2: 100 % O2 Device: None (Room air)    Weight: 166 lbs 0 oz    General Appearance: Jaundiced, alert and oriented x 3.   Eyes: Scleral icterus, PERRLA, EOMI  HEENT: MMM, mild pharyngeal erythema    Respiratory: CTAB   Cardiovascular: RRR, systolic murmur  GI: Nontender, slightly distended   Skin: Jaundice, no rashes on limited exam, no telangiectasias, spider angiomas.   Neurologic: no asterixis, nystagmus present, CN II-XII intact, sensation intact bilaterally, 5/5 muscle strength in UE and LE     Data   Data reviewed today: I reviewed all medications, new labs and imaging results over the last 24 hours. I personally reviewed the EKG tracing showing fairly flat t waves.    Recent Labs   Lab 03/17/19  0535 03/16/19  0611 03/15/19  0636   WBC 6.1 5.8 5.8   HGB 9.0* 8.5* 9.2*   * 110* 109*   PLT 73* 59* 61*   INR 2.04* 2.01* 1.98*    142 144   POTASSIUM 4.0 3.9 3.6   CHLORIDE 120* 117* 121*   CO2 15* 11* 14*   BUN 18 20 21   CR 1.10 1.12 1.21   ANIONGAP 8 14 8   JULISSA 8.2* 8.2* 9.0   * 109* 116*   ALBUMIN 2.8* 3.0* 3.5   PROTTOTAL 4.9* 5.6* 6.0*   BILITOTAL 37.5* 38.3* 44.2*   ALKPHOS 225* 188* 184*   ALT 37 28 29   * 88* 86*

## 2019-03-17 NOTE — PLAN OF CARE
6B  Discharge Planner PT   Patient plan for discharge: home  Current status: Pt is IND with all flat bed mobility and transfers. Pt amb 720' without rest or device, progressing from SBA to IND, no LOB with horizontal and vertical head turns. Completed 4 stairs x2 without rail, progressing from SBA to IND.  Barriers to return to prior living situation: medical status  Recommendations for discharge: home with OP PT  Rationale for recommendations: Pt is near baseline IND and would be safe for home discharge. 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        Entered by: Mary Souza 03/17/2019 1:57 PM

## 2019-03-17 NOTE — PROGRESS NOTES
"[facility]    Hepatology Follow-up    CC: Confusion     Dx: Alcoholic hepatitis   Hepatic encephalopathy   Delirium   Alcoholic cirrhosis    Acute kidney injury    24 hour events: nil    Subjective:  Confused overnight per nursing staff  Mentation overall improved per wife- \"less goofy\" each day  No abdominal pain  Patient denies fevers, sweats or chills.    Medications  Current Facility-Administered Medications   Medication Dose Route Frequency     folic acid  1 mg Oral Daily     lactulose  20 g Oral 4x Daily     multivitamin, therapeutic  1 tablet Oral Daily     pantoprazole  40 mg Oral QAM AC     potassium chloride  40 mEq Oral BID     prednisoLONE  40 mg Oral Daily     promethazine  12.5 mg Intravenous Once     rifaximin  550 mg Oral BID     sodium bicarbonate  1,300 mg Oral TID     sodium chloride (PF)  3 mL Intracatheter Q8H     thiamine  500 mg Intravenous TID       Review of systems  A 10-point review of systems was negative.    Examination  /71 (BP Location: Left arm)   Pulse 98   Temp 98  F (36.7  C) (Oral)   Resp 24   Ht 1.778 m (5' 10\")   Wt 75.3 kg (166 lb)   SpO2 99%   BMI 23.82 kg/m      Intake/Output Summary (Last 24 hours) at 3/17/2019 1418  Last data filed at 3/17/2019 1200  Gross per 24 hour   Intake 2224 ml   Output --   Net 2224 ml       Gen- well, NAD, A+Ox3, jaundiced  CVS- RRR  RS- CTA  Abd- distended, SNT, BS+  Extr- no BESSY  Neuro- mild asterixis  Skin- jaundiced  Psych- normal mood    Laboratory  Lab Results   Component Value Date     03/17/2019    POTASSIUM 4.0 03/17/2019    CHLORIDE 120 03/17/2019    CO2 15 03/17/2019    BUN 18 03/17/2019    CR 1.10 03/17/2019       Lab Results   Component Value Date    BILITOTAL 37.5 03/17/2019    ALT 37 03/17/2019     03/17/2019    ALKPHOS 225 03/17/2019       Lab Results   Component Value Date    WBC 6.1 03/17/2019    HGB 9.0 03/17/2019     03/17/2019    PLT 73 03/17/2019       Lab Results "   Component Value Date    INR 2.04 03/17/2019     Radiology  Nil recent    Assessment  53 year old male with history of decompensated alcoholic cirrhosis admitted to hospital with alcoholic hepatitis complicated with pre-renal acute kidney injury.  MELD-Na= 29 (stable).  Jaundice improving.  Renal function stable.  Free water deficit increased with ongoing GI losses therefore would reduce lactulose frequency and start D5 infusion.  No need for fluid restriction in patients with cirrhosis- this is also contributing to free water deficit.      Recommendations  1.  Start D5 IV 100ml/hr  2.  No fluid restriction  3.  Reduce lactulose to TID  4.  Continue prednisolone day 5  5.  Continue lactulose and rifaximin    Please page GI fellow on call with questions    Gonzalo Bales MD  Hepatology staff

## 2019-03-17 NOTE — PLAN OF CARE
RN [id number]:  Alert and disoriented only to time. AVSS on RA. Denies pain and nausea. 2gm Na diet with good appetite. PIV SL'd. Voiding adequate amounts. BM x6 today; scheduled Lactulose administered. VPM + bed alarm remain activated. Up with SBA. Family at bedside this afternoon. Will continue to monitor and treat per plan of care.

## 2019-03-17 NOTE — PROGRESS NOTES
03/17/19 1400   Quick Adds   Type of Visit Initial PT Evaluation   Living Environment   Lives With spouse   Living Arrangements house   Home Accessibility stairs to enter home   Number of Stairs, Main Entrance 1   Stair Railings, Main Entrance none   Number of Stairs, Within Home, Primary 1   Living Environment Comment Pt is currently remodeling kitchen and plans to continue project.   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 Plans to return to work immediately.   Functional Level Prior   Ambulation 0-->independent   Transferring 0-->independent   Toileting 0-->independent   Bathing 0-->independent   Communication 0-->understands/communicates without difficulty   Swallowing 0-->swallows foods/liquids without difficulty   Fall history within last six months no   Which of the above functional risks had a recent onset or change? ambulation   Prior Functional Level Comment Pt was previously IND with all ADL's and mobility   General Information   Onset of Illness/Injury or Date of Surgery - Date 03/10/19   Referring Physician Leonarda Cabrera MD   Patient/Family Goals Statement To return to all physical activities and work   Pertinent History of Current Problem (include personal factors and/or comorbidities that impact the POC) 53 year old male with history of NAFLD cirrhosis s/p TIPS who presents with superimposed EtOH hepaitis w/o ascites in setting of relapsed alcohol use and AMBER.   Precautions/Limitations no known precautions/limitations   Heart Disease Risk Factors High blood pressure;Dislipidemia;Family history;Gender   Cognitive Status Examination   Orientation  "orientation to person, place and time   Level of Consciousness alert   Follows Commands and Answers Questions 100% of the time   Personal Safety and Judgment impaired   Pain Assessment   Patient Currently in Pain No   Posture    Posture Protracted shoulders;Forward head position   Range of Motion (ROM)   ROM Comment B UE/LE ROM WFL   Strength   Strength Comments B UE/LE strength >3/5   Bed Mobility   Bed Mobility Comments IND supine <> sit from flat bed   Transfer Skills   Transfer Comments IND sit <> stand   Gait   Gait Comments Amb 200' with SBA, no device; completes 4 stairs with no rail and SBA   Balance   Balance Comments static and dynamic balance adequate for safe mobility   General Therapy Interventions   Planned Therapy Interventions balance training;gait training;manual therapy;neuromuscular re-education;ROM;strengthening;stretching;transfer training;risk factor education;home program guidelines;progressive activity/exercise   Clinical Impression   Criteria for Skilled Therapeutic Intervention yes, treatment indicated   PT Diagnosis impaired functional mobility   Influenced by the following impairments balance, strength, endurance   Functional limitations due to impairments stairs, gait   Clinical Presentation Stable/Uncomplicated   Clinical Presentation Rationale medical status improving, clinical reasoning   Clinical Decision Making (Complexity) Low complexity   Therapy Frequency` daily   Predicted Duration of Therapy Intervention (days/wks) eval and treat x1   Anticipated Discharge Disposition Home with Outpatient Therapy   Risk & Benefits of therapy have been explained Yes   Patient, Family & other staff in agreement with plan of care Yes   [facility] AM-PAC  \"6 Clicks\" V.2 Basic Mobility Inpatient Short Form   1. Turning from your back to your side while in a flat bed without using bedrails? 4 - None   2. Moving from lying on your back to sitting on the side of a flat bed without using " bedrails? 4 - None   3. Moving to and from a bed to a chair (including a wheelchair)? 4 - None   4. Standing up from a chair using your arms (e.g., wheelchair, or bedside chair)? 4 - None   5. To walk in hospital room? 4 - None   6. Climbing 3-5 steps with a railing? 4 - None   Basic Mobility Raw Score (Score out of 24.Lower scores equate to lower levels of function) 24   Total Evaluation Time   Total Evaluation Time (Minutes) 7

## 2019-03-18 LAB
ALBUMIN SERPL-MCNC: 2.7 G/DL (ref 3.4–5)
ALP SERPL-CCNC: 242 U/L (ref 40–150)
ALT SERPL W P-5'-P-CCNC: 41 U/L (ref 0–70)
ANION GAP SERPL CALCULATED.3IONS-SCNC: 10 MMOL/L (ref 3–14)
AST SERPL W P-5'-P-CCNC: 109 U/L (ref 0–45)
BILIRUB SERPL-MCNC: 34 MG/DL (ref 0.2–1.3)
BLD PROD TYP BPU: NORMAL
BLD PROD TYP BPU: NORMAL
BLD UNIT ID BPU: 0
BLD UNIT ID BPU: 0
BLOOD PRODUCT CODE: NORMAL
BLOOD PRODUCT CODE: NORMAL
BPU ID: NORMAL
BPU ID: NORMAL
BUN SERPL-MCNC: 17 MG/DL (ref 7–30)
CALCIUM SERPL-MCNC: 8.2 MG/DL (ref 8.5–10.1)
CHLORIDE SERPL-SCNC: 119 MMOL/L (ref 94–109)
CO2 SERPL-SCNC: 14 MMOL/L (ref 20–32)
CREAT SERPL-MCNC: 1 MG/DL (ref 0.66–1.25)
ERYTHROCYTE [DISTWIDTH] IN BLOOD BY AUTOMATED COUNT: 18.7 % (ref 10–15)
GFR SERPL CREATININE-BSD FRML MDRD: 84 ML/MIN/{1.73_M2}
GLUCOSE SERPL-MCNC: 94 MG/DL (ref 70–99)
HCT VFR BLD AUTO: 26.9 % (ref 40–53)
HGB BLD-MCNC: 9.1 G/DL (ref 13.3–17.7)
INR PPP: 2.19 (ref 0.86–1.14)
MCH RBC QN AUTO: 36.5 PG (ref 26.5–33)
MCHC RBC AUTO-ENTMCNC: 33.8 G/DL (ref 31.5–36.5)
MCV RBC AUTO: 108 FL (ref 78–100)
PLATELET # BLD AUTO: 63 10E9/L (ref 150–450)
POTASSIUM SERPL-SCNC: 3.7 MMOL/L (ref 3.4–5.3)
PROT SERPL-MCNC: 4.6 G/DL (ref 6.8–8.8)
RBC # BLD AUTO: 2.49 10E12/L (ref 4.4–5.9)
SODIUM SERPL-SCNC: 141 MMOL/L (ref 133–144)
SODIUM SERPL-SCNC: 142 MMOL/L (ref 133–144)
TRANSFUSION STATUS PATIENT QL: NORMAL
WBC # BLD AUTO: 5.5 10E9/L (ref 4–11)

## 2019-03-18 PROCEDURE — 25000132 ZZH RX MED GY IP 250 OP 250 PS 637: Performed by: STUDENT IN AN ORGANIZED HEALTH CARE EDUCATION/TRAINING PROGRAM

## 2019-03-18 PROCEDURE — 85027 COMPLETE CBC AUTOMATED: CPT | Performed by: STUDENT IN AN ORGANIZED HEALTH CARE EDUCATION/TRAINING PROGRAM

## 2019-03-18 PROCEDURE — 12000012 ZZH R&B MS OVERFLOW UMMC

## 2019-03-18 PROCEDURE — 85610 PROTHROMBIN TIME: CPT | Performed by: STUDENT IN AN ORGANIZED HEALTH CARE EDUCATION/TRAINING PROGRAM

## 2019-03-18 PROCEDURE — 25000132 ZZH RX MED GY IP 250 OP 250 PS 637: Performed by: INTERNAL MEDICINE

## 2019-03-18 PROCEDURE — 25000128 H RX IP 250 OP 636: Performed by: STUDENT IN AN ORGANIZED HEALTH CARE EDUCATION/TRAINING PROGRAM

## 2019-03-18 PROCEDURE — 99233 SBSQ HOSP IP/OBS HIGH 50: CPT | Performed by: STUDENT IN AN ORGANIZED HEALTH CARE EDUCATION/TRAINING PROGRAM

## 2019-03-18 PROCEDURE — 25000132 ZZH RX MED GY IP 250 OP 250 PS 637

## 2019-03-18 PROCEDURE — 80053 COMPREHEN METABOLIC PANEL: CPT | Performed by: STUDENT IN AN ORGANIZED HEALTH CARE EDUCATION/TRAINING PROGRAM

## 2019-03-18 PROCEDURE — 25800030 ZZH RX IP 258 OP 636: Performed by: STUDENT IN AN ORGANIZED HEALTH CARE EDUCATION/TRAINING PROGRAM

## 2019-03-18 PROCEDURE — 40000894 ZZH STATISTIC OT IP EVAL DEFER: Performed by: OCCUPATIONAL THERAPIST

## 2019-03-18 PROCEDURE — 36415 COLL VENOUS BLD VENIPUNCTURE: CPT | Performed by: STUDENT IN AN ORGANIZED HEALTH CARE EDUCATION/TRAINING PROGRAM

## 2019-03-18 PROCEDURE — 84295 ASSAY OF SERUM SODIUM: CPT | Performed by: STUDENT IN AN ORGANIZED HEALTH CARE EDUCATION/TRAINING PROGRAM

## 2019-03-18 RX ORDER — DEXTROSE MONOHYDRATE 50 MG/ML
INJECTION, SOLUTION INTRAVENOUS
Status: DISPENSED
Start: 2019-03-18 | End: 2019-03-19

## 2019-03-18 RX ORDER — DEXTROSE MONOHYDRATE 50 MG/ML
INJECTION, SOLUTION INTRAVENOUS CONTINUOUS
Status: DISCONTINUED | OUTPATIENT
Start: 2019-03-18 | End: 2019-03-20

## 2019-03-18 RX ADMIN — FOLIC ACID 1 MG: 1 TABLET ORAL at 07:28

## 2019-03-18 RX ADMIN — POTASSIUM CHLORIDE 40 MEQ: 750 TABLET, EXTENDED RELEASE ORAL at 07:28

## 2019-03-18 RX ADMIN — DEXTROSE MONOHYDRATE: 50 INJECTION, SOLUTION INTRAVENOUS at 19:04

## 2019-03-18 RX ADMIN — PREDNISOLONE 40 MG: 5 TABLET ORAL at 07:28

## 2019-03-18 RX ADMIN — SODIUM BICARBONATE 650 MG TABLET 1300 MG: at 07:28

## 2019-03-18 RX ADMIN — RIFAXIMIN 550 MG: 550 TABLET ORAL at 07:28

## 2019-03-18 RX ADMIN — THIAMINE HYDROCHLORIDE 500 MG: 100 INJECTION, SOLUTION INTRAMUSCULAR; INTRAVENOUS at 18:02

## 2019-03-18 RX ADMIN — SODIUM BICARBONATE 650 MG TABLET 1300 MG: at 20:53

## 2019-03-18 RX ADMIN — DEXTROSE MONOHYDRATE: 50 INJECTION, SOLUTION INTRAVENOUS at 13:27

## 2019-03-18 RX ADMIN — THIAMINE HYDROCHLORIDE 500 MG: 100 INJECTION, SOLUTION INTRAMUSCULAR; INTRAVENOUS at 07:32

## 2019-03-18 RX ADMIN — SODIUM BICARBONATE 650 MG TABLET 1300 MG: at 13:34

## 2019-03-18 RX ADMIN — RIFAXIMIN 550 MG: 550 TABLET ORAL at 20:53

## 2019-03-18 RX ADMIN — POTASSIUM CHLORIDE 40 MEQ: 750 TABLET, EXTENDED RELEASE ORAL at 20:53

## 2019-03-18 RX ADMIN — LACTULOSE 20 G: 10 POWDER, FOR SOLUTION ORAL at 20:52

## 2019-03-18 RX ADMIN — PANTOPRAZOLE SODIUM 40 MG: 40 TABLET, DELAYED RELEASE ORAL at 07:28

## 2019-03-18 RX ADMIN — THERA TABS 1 TABLET: TAB at 07:28

## 2019-03-18 RX ADMIN — THIAMINE HYDROCHLORIDE 500 MG: 100 INJECTION, SOLUTION INTRAMUSCULAR; INTRAVENOUS at 12:37

## 2019-03-18 ASSESSMENT — ACTIVITIES OF DAILY LIVING (ADL)
ADLS_ACUITY_SCORE: 14

## 2019-03-18 ASSESSMENT — MIFFLIN-ST. JEOR: SCORE: 1585.6

## 2019-03-18 NOTE — PLAN OF CARE
[id number]:  Disoriented to time  VS: AVSS on RA  Pain/Nausea: No acute complaints overnight  Diet: 2G Na diet, eating well  LDA: R upper peripheral saline locked  GI: Multiple BMs overnight  Skin: Jaundiced  Mobility: steady gait, bed alarm on for forgetfulness and high falls risk. VPM in place. Setting off both  Will continue to monitor and follow POC.

## 2019-03-18 NOTE — PROGRESS NOTES
CLINICAL NUTRITION SERVICES - REASSESSMENT NOTE     Nutrition Prescription    Malnutrition Status:    Non-severe malnutrition in he context of acute on chronic illness    Recommendations already ordered by Registered Dietitian (RD):  Continue Breeze and Gelatein Plus (1 each daily)    Future/Additional Recommendations:  Encourage PO intake of meals TID + oral supplements as ordered     EVALUATION OF THE PROGRESS TOWARD GOALS   Diet: 2 gram Sodium   IVF with D5 @ 100 ml/hr (started 3/18 AM)    Intake: Patient reports good appetite/intake.  Tolerates the oral supplements and is agreeable to continue getting sent one of each daily.  Nursing reporting 100% of meals over the last 2 days, 50-75% from 3/13-3/15.       NEW FINDINGS   Meds: MVI, thiamine 5000 mg TID IV, folic acid 1 mg daily    Weight fairly stable with admission weight, ~74 kg.     MALNUTRITION  % Intake: </=75% for >/= 1 month (severe) - on admission, appears improved  % Weight Loss: None noted  Subcutaneous Fat Loss: Facial region:  Moderate and Thoracic/intercostal: Moderate  Muscle Loss: Facial & jaw region and Thoracic region (clavicle, acromium bone, deltoid, trapezius, pectoral): Mild-moderate  Fluid Accumulation/Edema: None noted  Malnutrition Diagnosis: Non-severe malnutrition in he context of acute on chronic illness    Previous Goals   Patient to consume % of nutritionally adequate meal trays TID, or the equivalent with supplements/snacks.  Evaluation: Met    Previous Nutrition Diagnosis  Inadequate oral intake  Evaluation: Improving    CURRENT NUTRITION DIAGNOSIS  Predicted inadequate nutrient intake (kcal/pro) related to altered mental status    INTERVENTIONS  Implementation  Discussed ongoing PO intake, encouraged ongoing intake of oral supplements (x2/day)  Medical food supplement therapy - continue (see above)    Goals  Patient to consume % of nutritionally adequate meal trays TID, or the equivalent with  supplements/snacks.    Monitoring/Evaluation  Progress toward goals will be monitored and evaluated per protocol.    Citlali Conway MS, RD, LD  Pager [phone]

## 2019-03-18 NOTE — PROGRESS NOTES
[facility], [place]    History and Physical - Constance 2 Service        Date of Admission:  3/10/2019    Assessment & Plan   Ivan Bell is a 53 year old male with history of NAFLD cirrhosis s/p TIPS who presents with superimposed EtOH hepaitis w/o ascites in setting of relapsed alcohol use and AMBER.     TODAY  - restart free water today  - Fluid restriction discontinued   - ongoing confusion - concern that patient is not safe at home alone  - Continue prednisolone (Carlos 6)  - discuss with renal for ongoing low HCO3    # NAFLD cirrhosis  # Alcohol hepatis, superimposed  # Alcohol use disorder  # MELD 33, worsening total bilirubin   Patient with confusion during hospitalization (infectious workup and CT head negative), which has improved overall, however still confused at times. Patient's sodium borderline elevated (144), which could be contributing. Will start D5W today with q8h sodium checks. Continue prednisolone for alcoholic hepatitis, MELD labs improving.   - Hepatology following, appreciate recs  - 40mg Prednisolone for EtOH hepatitis, to determine Lille score in AM  - d5W @100mL/hr  - q8h sodium check to avoid overcorrection   - 500mg IV Thiamine TID x7 days (if discharges earlier, high dose replacement for remainder)  - Continue folic acid and MV    - Daily MELD labs   - Daily weights, 2g sodium restricted diet  - PPI once daily     #Hypokalemia  #RTA Type II    #NAGMA, improving   Patient requiring ~330mEq of potassium upon admission to bring potassium from 2.1 --> 3.7. Urine potassium of 40 and low bicarbonate. Thought to be secondary to RTA Type II in setting of pre-renal injury --> ATN. Potassium and bicarbonate improving with scheduled repletion.   - Renal consult, appreciate recs - will need to discuss persistence of NAGMA and when expected resolution will occur  - 40mEq potassium BID   - Sodium bicarb 1300mg TID   - Daily BMP    # AMBER, improving   Likely pre-renal in  setting of poor po intake and high EtOH intake prior to admission.   - Continue to monitor     # Nicotine use disorder  Has 60 year pack history, quit smoking in 1994 but he still uses chewing tobacco. He is interested in quitting nicotine altogether and understands cancer risk associated with smokeless tobacco. Not interested in any nicotine replacement at this time.      Diet: 2g restricted sodium diet, 2L fluid restricted diet   Fluids: None  DVT Prophylaxis: VTE Prophylaxis contraindicated due to elevated INR  Blue Catheter: not present  Code Status: Full, discussed with patient and wife    Disposition Plan   Expected discharge: 1-2 days recommended to prior living arrangement once treatment of EtOH hepatitis is more clear.   Entered: Michael Patel MD 03/18/2019, 12:39 PM     Michael Patel MD  Staff physician  ______________________________________________________________________    Subjective  No acute events overnight. Still having some confusion this morning, reports he ordered dinner a little while ago.  Denies pain, shortness of breath or other concerns.  Very much wants to go home, but has been understanding of concerns for wellbeing and need for ongoing hospitalization.    Physical Exam   Vital Signs: Temp: 97.9  F (36.6  C) Temp src: Oral BP: 130/67   Heart Rate: 78 Resp: 16 SpO2: 100 % O2 Device: None (Room air)    Weight: 164 lbs 1.6 oz    General Appearance: Jaundiced, alert and oriented x 2   Eyes: Scleral icterus, PERRLA, EOMI  HEENT: MMM, mild pharyngeal erythema    Respiratory: CTAB   Cardiovascular: RRR, systolic murmur  GI: Nontender, slightly distended   Skin: Jaundice, no rashes on limited exam, no telangiectasias, spider angiomas.   Neurologic: no asterixis, DURAND, CN 2-12 grossly intact    Data

## 2019-03-18 NOTE — PLAN OF CARE
OT: Defer, Pt previously discharged from OT services for meeting all goals.  Pt able to discharge safely home with assistance from wife, see previous notes for more details.  Will complete orders at this time.

## 2019-03-18 NOTE — PROGRESS NOTES
"[facility]    Hepatology Follow-up    CC: AMS    Dx:       AH                Decompensated alcoholic cirrhosis, HE                AMBER    24 hour events:  Worsening hypernatremia, bilirubin continues to improve while on Po steroids     Subjective:  Feels well. Disoriented to time earlier this am, tolerating po and walking around  Patient denies fevers, sweats or chills.    Medications  Current Facility-Administered Medications   Medication Dose Route Frequency     folic acid  1 mg Oral Daily     lactulose  20 g Oral TID     multivitamin, therapeutic  1 tablet Oral Daily     pantoprazole  40 mg Oral QAM AC     potassium chloride  40 mEq Oral BID     prednisoLONE  40 mg Oral Daily     promethazine  12.5 mg Intravenous Once     rifaximin  550 mg Oral BID     sodium bicarbonate  1,300 mg Oral TID     sodium chloride (PF)  3 mL Intracatheter Q8H     thiamine  500 mg Intravenous TID       Review of systems  A 10-point review of systems was negative.    Examination  /67 (BP Location: Left arm)   Pulse 98   Temp 97.9  F (36.6  C) (Oral)   Resp 16   Ht 1.778 m (5' 10\")   Wt 74.4 kg (164 lb 1.6 oz)   SpO2 100%   BMI 23.55 kg/m      Intake/Output Summary (Last 24 hours) at 3/18/2019 1224  Last data filed at 3/18/2019 0900  Gross per 24 hour   Intake 818.33 ml   Output --   Net 818.33 ml       Gen-NAD, A+Ox3, Jaundiced  CVS- RRR  RS- CTA  Abd- Soft, NT/ND, +BS  Extr-No edema  Neuro-AAOx3, no asterexis  Skin- no rash      Laboratory  Lab Results   Component Value Date     03/18/2019    POTASSIUM 3.7 03/18/2019    CHLORIDE 119 03/18/2019    CO2 14 03/18/2019    BUN 17 03/18/2019    CR 1.00 03/18/2019       Lab Results   Component Value Date    BILITOTAL 34.0 03/18/2019    ALT 41 03/18/2019     03/18/2019    ALKPHOS 242 03/18/2019       Lab Results   Component Value Date    WBC 5.5 03/18/2019    HGB 9.1 03/18/2019     03/18/2019    PLT 63 03/18/2019       Lab Results "   Component Value Date    INR 2.19 03/18/2019     MELD-Na score: 29 at 3/18/2019  5:42 AM  MELD score: 29 at 3/18/2019  5:42 AM  Calculated from:  Serum Creatinine: 1 mg/dL at 3/18/2019  5:42 AM  Serum Sodium: 142 mmol/L (Rounded to 137 mmol/L) at 3/18/2019  5:42 AM  Total Bilirubin: 34 mg/dL at 3/18/2019  5:42 AM  INR(ratio): 2.19 at 3/18/2019  5:42 AM  Age: 53 years    Assessment  53 year old male with decompensated alcoholic cirrhosis admitted with AH complicated with AMBER. MELD-Na 29 (stable), bilirubin continues to improve while on PO steroids. Still with hyponatremia and FWD of 2.3 L today.    Recommendations  - Continue PO prednisolone (day 6)  - No fluids restriction  - Replace FWD with D5 100 ml/hr continuous  - Continue lactulose and rifaximin (limit lactulose to TID)    Discussed with hepatology staff, Dr. Nii Briggs MD  Hepatology  #[id number]

## 2019-03-18 NOTE — PLAN OF CARE
Vitals: VSS, afebrile.  Endocrine: n/a  Labs: Improving bilirubin and creatinine.  Pain: Patient denies pain.  PRN's: n/a  Diet: Did not order breakfast, ordered lunch.  LDA: PIV.  GI: Several loose stools, held Lactulose this morning.  : Voiding in the toilet, is not saving in the urinal.  Skin: Jaundiced, skin is intact.  Neuro: Disoriented to the year, but oriented X3, able to do serial additions and subtractions.  Mobility: Up ad dianna.  Education: n/a  Plan: D5W at 100cc/hr to start this afternoon, team to discuss plan of care with his wife when she gets here this afternoon.

## 2019-03-19 ENCOUNTER — APPOINTMENT (OUTPATIENT)
Dept: OCCUPATIONAL THERAPY | Facility: CLINIC | Age: 54
End: 2019-03-19
Payer: COMMERCIAL

## 2019-03-19 LAB
ALBUMIN SERPL-MCNC: 2.6 G/DL (ref 3.4–5)
ALP SERPL-CCNC: 279 U/L (ref 40–150)
ALT SERPL W P-5'-P-CCNC: 54 U/L (ref 0–70)
ANION GAP SERPL CALCULATED.3IONS-SCNC: 10 MMOL/L (ref 3–14)
AST SERPL W P-5'-P-CCNC: 133 U/L (ref 0–45)
BILIRUB SERPL-MCNC: 35.2 MG/DL (ref 0.2–1.3)
BUN SERPL-MCNC: 15 MG/DL (ref 7–30)
CALCIUM SERPL-MCNC: 7.9 MG/DL (ref 8.5–10.1)
CHLORIDE SERPL-SCNC: 113 MMOL/L (ref 94–109)
CO2 SERPL-SCNC: 14 MMOL/L (ref 20–32)
CREAT SERPL-MCNC: 1.02 MG/DL (ref 0.66–1.25)
ERYTHROCYTE [DISTWIDTH] IN BLOOD BY AUTOMATED COUNT: 18.6 % (ref 10–15)
GFR SERPL CREATININE-BSD FRML MDRD: 82 ML/MIN/{1.73_M2}
GLUCOSE SERPL-MCNC: 131 MG/DL (ref 70–99)
HCT VFR BLD AUTO: 27.9 % (ref 40–53)
HGB BLD-MCNC: 9.7 G/DL (ref 13.3–17.7)
INR PPP: 2.04 (ref 0.86–1.14)
MCH RBC QN AUTO: 36.2 PG (ref 26.5–33)
MCHC RBC AUTO-ENTMCNC: 34.8 G/DL (ref 31.5–36.5)
MCV RBC AUTO: 104 FL (ref 78–100)
PLATELET # BLD AUTO: 63 10E9/L (ref 150–450)
POTASSIUM SERPL-SCNC: 3.4 MMOL/L (ref 3.4–5.3)
PROT SERPL-MCNC: 5.1 G/DL (ref 6.8–8.8)
RBC # BLD AUTO: 2.68 10E12/L (ref 4.4–5.9)
SODIUM SERPL-SCNC: 138 MMOL/L (ref 133–144)
SODIUM SERPL-SCNC: 138 MMOL/L (ref 133–144)
SODIUM SERPL-SCNC: 141 MMOL/L (ref 133–144)
WBC # BLD AUTO: 7.2 10E9/L (ref 4–11)

## 2019-03-19 PROCEDURE — 99232 SBSQ HOSP IP/OBS MODERATE 35: CPT | Mod: GC | Performed by: PEDIATRICS

## 2019-03-19 PROCEDURE — 36416 COLLJ CAPILLARY BLOOD SPEC: CPT | Performed by: STUDENT IN AN ORGANIZED HEALTH CARE EDUCATION/TRAINING PROGRAM

## 2019-03-19 PROCEDURE — 25000132 ZZH RX MED GY IP 250 OP 250 PS 637: Performed by: STUDENT IN AN ORGANIZED HEALTH CARE EDUCATION/TRAINING PROGRAM

## 2019-03-19 PROCEDURE — 25000132 ZZH RX MED GY IP 250 OP 250 PS 637

## 2019-03-19 PROCEDURE — 25000128 H RX IP 250 OP 636: Performed by: STUDENT IN AN ORGANIZED HEALTH CARE EDUCATION/TRAINING PROGRAM

## 2019-03-19 PROCEDURE — 84295 ASSAY OF SERUM SODIUM: CPT | Performed by: STUDENT IN AN ORGANIZED HEALTH CARE EDUCATION/TRAINING PROGRAM

## 2019-03-19 PROCEDURE — 97535 SELF CARE MNGMENT TRAINING: CPT | Mod: GO | Performed by: OCCUPATIONAL THERAPIST

## 2019-03-19 PROCEDURE — 80053 COMPREHEN METABOLIC PANEL: CPT | Performed by: STUDENT IN AN ORGANIZED HEALTH CARE EDUCATION/TRAINING PROGRAM

## 2019-03-19 PROCEDURE — 25800030 ZZH RX IP 258 OP 636: Performed by: STUDENT IN AN ORGANIZED HEALTH CARE EDUCATION/TRAINING PROGRAM

## 2019-03-19 PROCEDURE — 85610 PROTHROMBIN TIME: CPT | Performed by: STUDENT IN AN ORGANIZED HEALTH CARE EDUCATION/TRAINING PROGRAM

## 2019-03-19 PROCEDURE — 36415 COLL VENOUS BLD VENIPUNCTURE: CPT | Performed by: STUDENT IN AN ORGANIZED HEALTH CARE EDUCATION/TRAINING PROGRAM

## 2019-03-19 PROCEDURE — 12000012 ZZH R&B MS OVERFLOW UMMC

## 2019-03-19 PROCEDURE — 85027 COMPLETE CBC AUTOMATED: CPT | Performed by: STUDENT IN AN ORGANIZED HEALTH CARE EDUCATION/TRAINING PROGRAM

## 2019-03-19 PROCEDURE — 40000556 ZZH STATISTIC PERIPHERAL IV START W US GUIDANCE

## 2019-03-19 PROCEDURE — 25000132 ZZH RX MED GY IP 250 OP 250 PS 637: Performed by: INTERNAL MEDICINE

## 2019-03-19 RX ADMIN — THIAMINE HYDROCHLORIDE 500 MG: 100 INJECTION, SOLUTION INTRAMUSCULAR; INTRAVENOUS at 07:57

## 2019-03-19 RX ADMIN — LACTULOSE 20 G: 10 POWDER, FOR SOLUTION ORAL at 07:57

## 2019-03-19 RX ADMIN — PREDNISOLONE 40 MG: 5 TABLET ORAL at 07:57

## 2019-03-19 RX ADMIN — SODIUM BICARBONATE 650 MG TABLET 1300 MG: at 20:25

## 2019-03-19 RX ADMIN — SODIUM BICARBONATE 650 MG TABLET 1300 MG: at 07:57

## 2019-03-19 RX ADMIN — SODIUM BICARBONATE 650 MG TABLET 1300 MG: at 13:38

## 2019-03-19 RX ADMIN — PANTOPRAZOLE SODIUM 40 MG: 40 TABLET, DELAYED RELEASE ORAL at 07:57

## 2019-03-19 RX ADMIN — LACTULOSE 20 G: 10 SOLUTION ORAL at 16:13

## 2019-03-19 RX ADMIN — THERA TABS 1 TABLET: TAB at 07:57

## 2019-03-19 RX ADMIN — POTASSIUM CHLORIDE 40 MEQ: 750 TABLET, EXTENDED RELEASE ORAL at 20:25

## 2019-03-19 RX ADMIN — POTASSIUM CHLORIDE 40 MEQ: 750 TABLET, EXTENDED RELEASE ORAL at 07:57

## 2019-03-19 RX ADMIN — DEXTROSE MONOHYDRATE: 50 INJECTION, SOLUTION INTRAVENOUS at 06:16

## 2019-03-19 RX ADMIN — THIAMINE HYDROCHLORIDE 500 MG: 100 INJECTION, SOLUTION INTRAMUSCULAR; INTRAVENOUS at 11:30

## 2019-03-19 RX ADMIN — FOLIC ACID 1 MG: 1 TABLET ORAL at 07:57

## 2019-03-19 RX ADMIN — RIFAXIMIN 550 MG: 550 TABLET ORAL at 07:57

## 2019-03-19 RX ADMIN — THIAMINE HYDROCHLORIDE 500 MG: 100 INJECTION, SOLUTION INTRAMUSCULAR; INTRAVENOUS at 17:39

## 2019-03-19 RX ADMIN — LACTULOSE 20 G: 10 SOLUTION ORAL at 17:39

## 2019-03-19 RX ADMIN — RIFAXIMIN 550 MG: 550 TABLET ORAL at 20:25

## 2019-03-19 RX ADMIN — DEXTROSE MONOHYDRATE: 50 INJECTION, SOLUTION INTRAVENOUS at 16:51

## 2019-03-19 ASSESSMENT — ACTIVITIES OF DAILY LIVING (ADL)
ADLS_ACUITY_SCORE: 14

## 2019-03-19 ASSESSMENT — MIFFLIN-ST. JEOR: SCORE: 1609.64

## 2019-03-19 NOTE — PLAN OF CARE
VSS. [facility] protocol - 1. Relatively alert with maybe some disorientation to situation. Waxes and wanes. May have been related to change in disposition conversation, patient therefore looking to be discharge. Evidence of understanding as patient did state that he is hoping to go tomorrow if everything goes well. States that he feels better given that his stool is more formed. Got x1 Lactulose evening shift. Serial sodium q8, timed 2130 did not get drawn. Drawn @ 0145 - 141. 2 gm sodium diet.    L PIV - D5 @ 100 ml/hr. Voids, does not save. BM x1 overnight. VPM

## 2019-03-19 NOTE — PLAN OF CARE
"/81 (BP Location: Left arm)   Pulse 77   Temp 97.8  F (36.6  C) (Oral)   Resp 18   Ht 1.778 m (5' 10\")   Wt 74.4 kg (164 lb 1.6 oz)   SpO2 100%   BMI 23.55 kg/m       Pt scored 1 on the [place] but for very minimal confusion. Pt appears to hid confusion well and did not appear disoriented majority of the shift. However, patient's wife made a few comments about concerns regarding things patient was saying such as not seeing the second hand moving on the room clock.    VS: stable on room air.   BG: n/a.   LABS: q8 hr Na checks; see chart.   Pain: denied.   PRN: n/a.  Nausea: denied.   Diet: 2 gm Na diet, fair appetite.   LDA: PIV with D5% @ 100 ml/hr and receiving thiamine infusions TID.   GI/: voiding but not saving, no reported BM this shift and on scheduled lactulose. It was reported that pt had more stools than the goal number today.  Skin: jaundice and ecchymotic, especially around IV site. .   Mobility: up independently.   Education: pt reminded to call for help but is setting off bed alarm at times. VPM in room for added safety measures. Pt also reminded to take his pole with him when getting out of bed and ambulating.   Plan: Continue POC.     All care explained and questions answered. Will continue to monitor and notify team of changes.     "

## 2019-03-19 NOTE — PLAN OF CARE
AVSS and denies pain.  Patient alert and oriented to person, place, situation, but disoriented to time - confusion appears to be intermittent at times, bed alarm and VPM remain in place.  Patient up with standby assist, voiding, BMx3, and tolerating diet with good appetite. Sodium to be drawn at 1330.  Scheduled meds given as ordered.  Continue to monitor, treat as ordered, notify team with changes.

## 2019-03-19 NOTE — PROGRESS NOTES
"SPIRITUAL HEALTH SERVICES  SPIRITUAL ASSESSMENT Progress Note  [facility] [address] [facility]     PRIMARY FOCUS:     Goals of care    Emotional/spiritual/Denominational distress    Support for coping    REFERRAL SOURCE: Length-of-stay    ILLNESS CIRCUMSTANCES:   Reviewed documentation. Reflective conversation shared with Ivan which integrated elements of illness and family narratives.     Context of Serious Illness/Symptom(s) - Ivan did not go into great detail about his illness other than to say that he knows now \"that [he] can never drink again.\" Ivan cited extreme frustration and disappointment at being hospitalized for longer than he had anticipated. He described the experience of being hospitalized for so long as \"hell.\"    Resources for Support  o Family     DISTRESS:     Emotional/Spiritual/Existential Distress - Ivan explained that he was sober for \"18 months\" and then started drinking again and it was \"worse than it was before.\" He named his fear that \"it could happen again.\" We discussed the principle of living one day at a time and his comitment to being sober for today as something that is more manageable than thinking in \"forever\" terms.    Faith Distress - none discussed    Social/Cultural/Economic Distress - Ivan endorsed feeling isolated, saying that he \"hasn't seen his wife for two weeks\" and he is concerned that she is worried that \"it will happen again,\" referring to his relapse.     SPIRITUAL/Mu-ism COPING:     Islam/Carol - none discussed     Spiritual Practice(s) - Ivan said he has tried AA and plans to return, with an understanding that \"he can't go every day but he can go every other day.\"     Emotional/Relational/Existential Connections - Ivan spoke about his wife several times and his desire to stay sober for their relationship.    GOALS OF CARE:    Goals of Care - Ivan wants to get out of the hospital so he can \"take his wife out to eat.\" He is committed to \"never drinking " "again.\"    Meaning/Sense-Making - Ivan appears to be understanding his hospitalization as a wake-up call that he should refrain from alcohol.     PLAN: I will bring Ivan a schedule of the weekly AA meetings in [place], as he said he did not have one and would like one. [facility] remains available to support Ivan as needed.    Ed Dejesus  Chaplain Resident  Pager [phone]    "

## 2019-03-19 NOTE — PLAN OF CARE
Discharge Planner OT   Patient plan for discharge: home with assist from wife  Current status: mod I ambulation with IV pole and was walking with wife prior to OT arrival. Cognitive assessment with SLUMS, score of 8/30 today indicating dementia-like cognitive deficits. (MoCA completed on 3/11/19 with score of 17/30 with norms of a score of 26 and above as normal cognition.) Therapist educated pt and his wife on cognitive scores and recommendations for 24 hour supervision including medication management.   Barriers to return to prior living situation: cognition, medical status  Recommendations for discharge: home with assist from wife, recommending 24 hours supervision including medication management.  Rationale for recommendations: pt with some confusion during this session. Pt presents with dementia-like cognitive deficits based on low cognitive score on SLUMS. OT recommends 24 hours supervision including medication management.        Entered by: Susan Simmons 03/19/2019 2:28 PM

## 2019-03-19 NOTE — PROGRESS NOTES
[facility], [place]    History and Physical - Mardarcy 2 Service        Date of Admission:  3/10/2019    Assessment & Plan   Ivan Bell is a 53 year old male with history of NAFLD cirrhosis s/p TIPS who presents with superimposed EtOH hepaitis w/o ascites in setting of relapsed alcohol use and AMBER. Patient's confusion markedly improved today. MELD labs improved overall with decrease in total bilirubin today.     TODAY  - continue to monitor rising bilirubin and creatinine  - discuss with renal HCO3 supplementation    # NAFLD cirrhosis  # Alcohol hepatis, superimposed  # Alcohol use disorder  # MELD 33, worsening total bilirubin   Patient sober for 18 months but then started drinking again in January. Presented to the hospital with laboratory values concerning for EtOH hepatitis. Patient's Maddrey score was 88 but no steroids were started. Abdominal ultrasound on admission showed no evidence of thrombosis or ascites but did show increased stent velocities concerning for stenosis. Abdominal ultrasound repeated 3/13 with no significant change. Patient with confusion during hospitalization (infectious workup and CT head negative), which has markedly improved today. Patient's MELD 30 with decrease in total bilirubin today.   - Hepatology following, appreciate recs  - 40mg Prednisolone for EtOH hepatitis for total of 7 days. At that time, Lille score will be calculated and further steroid recommendations will be made at that time.   - 500mg IV Thiamine TID for possible Wernicke's encephalopathy (x7 days total)  - Lactulose per [facility] protocol   - Continue folic acid and MV    - Daily MELD labs   - Daily weights, 2L fluid restriction, 2g sodium restricted diet  - PPI once daily     MELD-Na score: 28 at 3/19/2019  6:06 AM  MELD score: 28 at 3/19/2019  6:06 AM  Calculated from:  Serum Creatinine: 1.02 mg/dL at 3/19/2019  6:06 AM  Serum Sodium: 138 mmol/L (Rounded to 137 mmol/L) at  3/19/2019  6:06 AM  Total Bilirubin: 35.2 mg/dL at 3/19/2019  6:06 AM  INR(ratio): 2.04 at 3/19/2019  6:06 AM  Age: 53 years    Consults  - Chem dep consult, appreciate recs   - Hepatology consult, appreciate recs    Treatment  - [facility] protocol for lactulose mgmt   - Rifaxamin    Follow up  - TIPS and EtOH hepatitis follow-up as outpatient   - Outpatient chem dep     #Hypokalemia  #RTA Type II    #NAGMA, improving   Patient requiring ~330mEq of potassium upon admission to bring potassium from 2.1 --> 3.7. Urine potassium of 40 and low bicarbonate. Thought to be secondary to RTA Type II in setting of pre-renal injury --> ATN.   - Renal consult, appreciate recs  - 40mEq potassium BID   - Potassium repletion protocol discontinued   - Continue bicarb, discuss dosing with renal  - Daily BMP    # AMBER, improving   Likely pre-renal in setting of poor po intake and high EtOH intake prior to admission. Responded well to albumin challenge. Creatinine improved this AM.   - Continue to monitor     # Nicotine use disorder  Has 60 year pack history, quit smoking in 1994 but he still uses chewing tobacco. He is interested in quitting nicotine altogether and understands cancer risk associated with smokeless tobacco. Not interested in any nicotine replacement at this time.      Diet: 2g restricted sodium diet, 2L fluid restricted diet   Fluids: None  DVT Prophylaxis: VTE Prophylaxis contraindicated due to elevated INR  Blue Catheter: not present  Code Status: Full, discussed with patient and wife    Disposition Plan   Expected discharge: 1-2 days recommended to prior living arrangement once treatment of EtOH hepatitis is more clear.     Micheal Patel MD  Staff physician  ______________________________________________________________________    Subjective  Ivan continues to report feeling well.  He acknowledges some confusion, but thinks he is improving overall. Denies significant concerns - no abdominal pain, cough, shortness  of breath, headache, blurred vision.  He wants to go home, but is understanding of need to stay.     Physical Exam   Vital Signs: Temp: 98.1  F (36.7  C) Temp src: Oral BP: 129/74 Pulse: 77 Heart Rate: 78 Resp: 16 SpO2: 100 % O2 Device: None (Room air)    Weight: 169 lbs 6.4 oz    General Appearance: Jaundiced, alert and oriented x 3.   Eyes: Scleral icterus, PERRLA, EOMI  HEENT: MMM, mild pharyngeal erythema    Respiratory: Rare crackles, Breathing comfortably on RA.   Cardiovascular: RRR, systolic murmur  GI: Nontender, slightly distended   Skin: Jaundice, no rashes on limited exam, no telangiectasias, spider angiomas.   Neurologic: no asterixis, DURAND, CN 2-12 grossly intact    Data   Data reviewed today: I reviewed all medications, new labs and imaging results over the last 24 hours. I personally reviewed the EKG tracing showing fairly flat t waves.    Recent Labs   Lab 03/19/19  0606 03/19/19  0145 03/18/19  1333 03/18/19  0542  03/17/19  0535   WBC 7.2  --   --  5.5  --  6.1   HGB 9.7*  --   --  9.1*  --  9.0*   *  --   --  108*  --  111*   PLT 63*  --   --  63*  --  73*   INR 2.04*  --   --  2.19*  --  2.04*    141 141 142   < > 144   POTASSIUM 3.4  --   --  3.7  --  4.0   CHLORIDE 113*  --   --  119*  --  120*   CO2 14*  --   --  14*  --  15*   BUN 15  --   --  17  --  18   CR 1.02  --   --  1.00  --  1.10   ANIONGAP 10  --   --  10  --  8   JULISSA 7.9*  --   --  8.2*  --  8.2*   *  --   --  94  --  126*   ALBUMIN 2.6*  --   --  2.7*  --  2.8*   PROTTOTAL 5.1*  --   --  4.6*  --  4.9*   BILITOTAL 35.2*  --   --  34.0*  --  37.5*   ALKPHOS 279*  --   --  242*  --  225*   ALT 54  --   --  41  --  37   *  --   --  109*  --  104*    < > = values in this interval not displayed.

## 2019-03-20 LAB
ALBUMIN SERPL-MCNC: 2.4 G/DL (ref 3.4–5)
ALP SERPL-CCNC: 259 U/L (ref 40–150)
ALT SERPL W P-5'-P-CCNC: 60 U/L (ref 0–70)
ANION GAP SERPL CALCULATED.3IONS-SCNC: 11 MMOL/L (ref 3–14)
AST SERPL W P-5'-P-CCNC: 138 U/L (ref 0–45)
BACTERIA SPEC CULT: NO GROWTH
BACTERIA SPEC CULT: NO GROWTH
BILIRUB SERPL-MCNC: 31.2 MG/DL (ref 0.2–1.3)
BUN SERPL-MCNC: 13 MG/DL (ref 7–30)
CALCIUM SERPL-MCNC: 7.7 MG/DL (ref 8.5–10.1)
CHLORIDE SERPL-SCNC: 114 MMOL/L (ref 94–109)
CO2 SERPL-SCNC: 16 MMOL/L (ref 20–32)
CREAT SERPL-MCNC: 1.02 MG/DL (ref 0.66–1.25)
ERYTHROCYTE [DISTWIDTH] IN BLOOD BY AUTOMATED COUNT: 19 % (ref 10–15)
GFR SERPL CREATININE-BSD FRML MDRD: 82 ML/MIN/{1.73_M2}
GLUCOSE SERPL-MCNC: 93 MG/DL (ref 70–99)
HCT VFR BLD AUTO: 26.2 % (ref 40–53)
HGB BLD-MCNC: 8.9 G/DL (ref 13.3–17.7)
INR PPP: 2.1 (ref 0.86–1.14)
Lab: NORMAL
Lab: NORMAL
MCH RBC QN AUTO: 35.9 PG (ref 26.5–33)
MCHC RBC AUTO-ENTMCNC: 34 G/DL (ref 31.5–36.5)
MCV RBC AUTO: 106 FL (ref 78–100)
PLATELET # BLD AUTO: 61 10E9/L (ref 150–450)
POTASSIUM SERPL-SCNC: 3.2 MMOL/L (ref 3.4–5.3)
PROT SERPL-MCNC: 4.7 G/DL (ref 6.8–8.8)
RBC # BLD AUTO: 2.48 10E12/L (ref 4.4–5.9)
SODIUM SERPL-SCNC: 140 MMOL/L (ref 133–144)
SODIUM SERPL-SCNC: 141 MMOL/L (ref 133–144)
SODIUM SERPL-SCNC: 142 MMOL/L (ref 133–144)
SODIUM SERPL-SCNC: 143 MMOL/L (ref 133–144)
SPECIMEN SOURCE: NORMAL
SPECIMEN SOURCE: NORMAL
WBC # BLD AUTO: 5 10E9/L (ref 4–11)

## 2019-03-20 PROCEDURE — 25000132 ZZH RX MED GY IP 250 OP 250 PS 637: Performed by: STUDENT IN AN ORGANIZED HEALTH CARE EDUCATION/TRAINING PROGRAM

## 2019-03-20 PROCEDURE — 25800030 ZZH RX IP 258 OP 636: Performed by: STUDENT IN AN ORGANIZED HEALTH CARE EDUCATION/TRAINING PROGRAM

## 2019-03-20 PROCEDURE — 25000132 ZZH RX MED GY IP 250 OP 250 PS 637

## 2019-03-20 PROCEDURE — 85610 PROTHROMBIN TIME: CPT | Performed by: STUDENT IN AN ORGANIZED HEALTH CARE EDUCATION/TRAINING PROGRAM

## 2019-03-20 PROCEDURE — 25000132 ZZH RX MED GY IP 250 OP 250 PS 637: Performed by: INTERNAL MEDICINE

## 2019-03-20 PROCEDURE — 99232 SBSQ HOSP IP/OBS MODERATE 35: CPT | Mod: GC | Performed by: PEDIATRICS

## 2019-03-20 PROCEDURE — 85027 COMPLETE CBC AUTOMATED: CPT | Performed by: STUDENT IN AN ORGANIZED HEALTH CARE EDUCATION/TRAINING PROGRAM

## 2019-03-20 PROCEDURE — 25000128 H RX IP 250 OP 636: Performed by: STUDENT IN AN ORGANIZED HEALTH CARE EDUCATION/TRAINING PROGRAM

## 2019-03-20 PROCEDURE — 80053 COMPREHEN METABOLIC PANEL: CPT | Performed by: STUDENT IN AN ORGANIZED HEALTH CARE EDUCATION/TRAINING PROGRAM

## 2019-03-20 PROCEDURE — 84295 ASSAY OF SERUM SODIUM: CPT | Performed by: STUDENT IN AN ORGANIZED HEALTH CARE EDUCATION/TRAINING PROGRAM

## 2019-03-20 PROCEDURE — 12000012 ZZH R&B MS OVERFLOW UMMC

## 2019-03-20 PROCEDURE — 36415 COLL VENOUS BLD VENIPUNCTURE: CPT | Performed by: STUDENT IN AN ORGANIZED HEALTH CARE EDUCATION/TRAINING PROGRAM

## 2019-03-20 PROCEDURE — 40000141 ZZH STATISTIC PERIPHERAL IV START W/O US GUIDANCE

## 2019-03-20 RX ORDER — POTASSIUM CHLORIDE 1500 MG/1
60 TABLET, EXTENDED RELEASE ORAL 2 TIMES DAILY
Status: DISCONTINUED | OUTPATIENT
Start: 2019-03-20 | End: 2019-03-22 | Stop reason: HOSPADM

## 2019-03-20 RX ORDER — DEXTROSE MONOHYDRATE 50 MG/ML
INJECTION, SOLUTION INTRAVENOUS CONTINUOUS
Status: DISCONTINUED | OUTPATIENT
Start: 2019-03-20 | End: 2019-03-21

## 2019-03-20 RX ADMIN — FOLIC ACID 1 MG: 1 TABLET ORAL at 09:14

## 2019-03-20 RX ADMIN — SODIUM BICARBONATE 650 MG TABLET 1300 MG: at 09:14

## 2019-03-20 RX ADMIN — POTASSIUM CHLORIDE 60 MEQ: 1500 TABLET, EXTENDED RELEASE ORAL at 19:29

## 2019-03-20 RX ADMIN — THERA TABS 1 TABLET: TAB at 09:14

## 2019-03-20 RX ADMIN — DEXTROSE MONOHYDRATE: 50 INJECTION, SOLUTION INTRAVENOUS at 12:46

## 2019-03-20 RX ADMIN — PANTOPRAZOLE SODIUM 40 MG: 40 TABLET, DELAYED RELEASE ORAL at 09:14

## 2019-03-20 RX ADMIN — THIAMINE HYDROCHLORIDE 500 MG: 100 INJECTION, SOLUTION INTRAMUSCULAR; INTRAVENOUS at 17:49

## 2019-03-20 RX ADMIN — SODIUM BICARBONATE 650 MG TABLET 1300 MG: at 14:27

## 2019-03-20 RX ADMIN — DEXTROSE MONOHYDRATE: 50 INJECTION, SOLUTION INTRAVENOUS at 04:19

## 2019-03-20 RX ADMIN — LACTULOSE 20 G: 10 POWDER, FOR SOLUTION ORAL at 19:26

## 2019-03-20 RX ADMIN — DEXTROSE MONOHYDRATE: 50 INJECTION, SOLUTION INTRAVENOUS at 17:49

## 2019-03-20 RX ADMIN — LACTULOSE 20 G: 10 SOLUTION ORAL at 00:49

## 2019-03-20 RX ADMIN — RIFAXIMIN 550 MG: 550 TABLET ORAL at 09:14

## 2019-03-20 RX ADMIN — SODIUM BICARBONATE 650 MG TABLET 1300 MG: at 19:26

## 2019-03-20 RX ADMIN — THIAMINE HYDROCHLORIDE 500 MG: 100 INJECTION, SOLUTION INTRAMUSCULAR; INTRAVENOUS at 11:17

## 2019-03-20 RX ADMIN — RIFAXIMIN 550 MG: 550 TABLET ORAL at 19:26

## 2019-03-20 RX ADMIN — THIAMINE HYDROCHLORIDE 500 MG: 100 INJECTION, SOLUTION INTRAMUSCULAR; INTRAVENOUS at 09:15

## 2019-03-20 RX ADMIN — LACTULOSE 20 G: 10 POWDER, FOR SOLUTION ORAL at 09:14

## 2019-03-20 RX ADMIN — POTASSIUM CHLORIDE 60 MEQ: 1500 TABLET, EXTENDED RELEASE ORAL at 09:14

## 2019-03-20 ASSESSMENT — ACTIVITIES OF DAILY LIVING (ADL)
ADLS_ACUITY_SCORE: 14

## 2019-03-20 NOTE — PLAN OF CARE
Ivan had set off his bed alarm/VPM x2 both times needing to go the the bathroom. At 0150 I did give him a PRN dose of lactulose and at 0300 after going to the bathroom he was very clear with his speech and thought. Was asking about his liver disease and if he would get better. He was open about his drinking and how upset he was after being 18mo sober and then started drinking. We talked about the importance of not drinking and staying with the medicines he care provider subscribes as well keeping up with his care provider visit. This nurse listened to his concerns and reinforced with him the importance of self care. He appeared to be very pleased to be conversing with a clearer mind then he had at the start of shift. Will continue to monitor and report any significant changes.

## 2019-03-20 NOTE — PROGRESS NOTES
Care Coordinator Progress Note    Admission Date/Time:  3/10/2019  Attending MD:  Kiera Gray MD    Data  Chart reviewed, discussed with interdisciplinary team.   Patient was admitted for:    Decompensated hepatic cirrhosis (H)  Hypokalemia  Dizziness and giddiness  Nausea  Epistaxis  Hematuria syndrome  Decompensation of cirrhosis of liver (H).    Concerns with insurance coverage for discharge needs: None.  Current Living Situation: Patient lives with spouse.  Support System: Supportive and Involved  Services Involved: None at time of admission.  Transportation at Discharge: Family or friend will provide  Transportation to Medical Appointments:   - Name of caregiver: SpouseElicia  Barriers to Discharge: Medical stability.     Coordination of Care and Referrals: Provided patient/family with options for Home Care.        Assessment  Patient is a 53yr odl male with a history of NAFLD cirrhosis s/p TIPS who was admitted with superimposed EtOH hepatitis w/o ascites in setting of relapsed alcohol use and AMBER. Writer introduced self and role of RNCC to patient and his spouse, Elicia. Patient lives locally and has no current services. Per chart review, OT recommending home w/assist and 24hr supervision. At this time, patient's wife has stated that she will be taking Friday off of work and will be able to get things situated over the weekend, noting that their daughter in-law who is currently not working has offered to help. Patient and spouse have declined home care services at this time, noting that they don't see a need for weekly nursing visits. Patient's wife will provide transportation at time of discharge. RNCC will continue to follow to assist with discharge needs.      Plan  Anticipated Discharge Date:  TBD  Anticipated Discharge Plan:  Home w/asst and 24hr supervision from family.     Jolanta Salomon, RNCC, BSN    [facility]  [address] [place]  [address]    [email or web address]  [email or web address]    Office: [phone] Pager: [phone]  To contact weekend MAYA, dial [phone] and enter pager number [phone] at prompt. This pager can not be contacted by text page or outside line.

## 2019-03-20 NOTE — PROGRESS NOTES
[facility], [place]    History and Physical - Mardarcy 2 Service        Date of Admission:  3/10/2019    Assessment & Plan   Ivan Bell is a 53 year old male with history of NAFLD cirrhosis s/p TIPS who presents with superimposed EtOH hepaitis w/o ascites in setting of relapsed alcohol use and AMBER.     TODAY  - OT consult for cognitive evaluation   - Last day of prednisolone     # NAFLD cirrhosis  # Alcohol hepatis, superimposed  # Alcohol use disorder  # MELD 33, worsening total bilirubin   Patient with confusion during hospitalization (infectious workup and CT head negative), which has improved overall, however still confused at times. Consideration that borderline sodium (144) was contributing. Patient has been receiving D5W with q8h sodium checks. Remains confused at times. ?Prednisolone. Lille score calculated today and prednisolone is no longer indicated.   - Hepatology following, appreciate recs  - Last day of Prednisolone   - OT consult for confusion: recommend 24-hour assist at home   - d5W @100mL/hr  - q8h sodium check to avoid overcorrection   - 500mg IV Thiamine TID x7 days (if discharges earlier, high dose replacement for remainder)  - Continue folic acid and MV    - Daily MELD labs   - Daily weights, 2g sodium restricted diet  - PPI once daily     #Hypokalemia  #RTA Type II    #NAGMA, improving   Patient requiring ~330mEq of potassium upon admission to bring potassium from 2.1 --> 3.7. Urine potassium of 40 and low bicarbonate. Thought to be secondary to RTA Type II in setting of pre-renal injury --> ATN. Potassium and bicarbonate improving with scheduled repletion.   - Renal consult, appreciate recs   - 40mEq potassium BID   - Sodium bicarb 1300mg TID   - Daily BMP    # AMBER, improving   Likely pre-renal in setting of poor po intake and high EtOH intake prior to admission.   - Continue to monitor     # Nicotine use disorder  Has 60 year pack history, quit smoking in  1994 but he still uses chewing tobacco. He is interested in quitting nicotine altogether and understands cancer risk associated with smokeless tobacco. Not interested in any nicotine replacement at this time.      Diet: 2g restricted sodium diet  Fluids: D5W @ 100ml/hr   DVT Prophylaxis: VTE Prophylaxis contraindicated due to elevated INR  Blue Catheter: not present  Code Status: Full, discussed with patient and wife    Disposition Plan   Expected discharge: 1-2 days recommended to prior living arrangement once treatment of EtOH hepatitis is more clear.   Entered: Leonarda Cabrera MD 03/19/2019, 8:13 PM     Leonarda Cabrera DO  PGY1 Internal Medicine  [phone]  ______________________________________________________________________    Subjective  No acute events overnight. Still having confusion this AM. Unable to read the first and second hand of the clock. States he did not sleep well overnight. Denies fever, chest pain, SOB, cough, abdominal pain. Continues to have multiple loose BMs a day.     Physical Exam   Vital Signs: Temp: 97.7  F (36.5  C) Temp src: Oral BP: 121/62 Pulse: 78 Heart Rate: 75 Resp: 18 SpO2: 99 % O2 Device: None (Room air)    Weight: 169 lbs 6.4 oz    General Appearance: Jaundiced, alert and oriented x 2   Eyes: Scleral icterus, PERRLA, EOMI  HEENT: MMM, mild pharyngeal erythema    Respiratory: CTAB   Cardiovascular: RRR, systolic murmur  GI: Nontender, slightly distended   Skin: Jaundice, no rashes on limited exam, no telangiectasias, spider angiomas.   Neurologic: no asterixis, CN 2-12 grossly intact

## 2019-03-20 NOTE — PLAN OF CARE
"/62 (BP Location: Right arm)   Pulse 78   Temp 97.7  F (36.5  C) (Oral)   Resp 18   Ht 1.778 m (5' 10\")   Wt 76.8 kg (169 lb 6.4 oz)   SpO2 99%   BMI 24.31 kg/m       Pt confused intermittently and frustrated at times about not being able to discharge home.    VS: stable on room air.   BG: n/a.   LABS: see chart.   Pain: denied.   PRN: n/a.  Nausea: denied.   Diet: 2 gm Na diet.   LDA: PIV with D5 @ 100 ml/hr.   GI/: voiding but not saving and had multiple BMs this shift. Pt on lactulose protocol with [place] but did not take last dose of 3 d/t \"upset stomach\".   Skin: jaundice and ecchymotic.   Mobility: up SBA.   Education: pt reminded to call for help when getting out of bed. Bed alarm on and VPM.   Plan: continue POC.     All care explained and questions answered. Will continue to monitor and notify team of changes.     "

## 2019-03-20 NOTE — PLAN OF CARE
AVSS and denies pain.  Patient alert and oriented to person, place, situation, but disoriented to time - confusion appears to be intermittent at times, bed alarm and VPM remain in place.  Patient up with standby assist, voiding, BMx3, and tolerating diet with good appetite. Sodium to be drawn at 1330 and 1600.  Scheduled meds given as ordered.  Continue to monitor, treat as ordered, notify team with changes.

## 2019-03-21 LAB
ALBUMIN SERPL-MCNC: 2.4 G/DL (ref 3.4–5)
ALP SERPL-CCNC: 280 U/L (ref 40–150)
ALT SERPL W P-5'-P-CCNC: 62 U/L (ref 0–70)
ANION GAP SERPL CALCULATED.3IONS-SCNC: 9 MMOL/L (ref 3–14)
AST SERPL W P-5'-P-CCNC: 159 U/L (ref 0–45)
BILIRUB SERPL-MCNC: 29.7 MG/DL (ref 0.2–1.3)
BUN SERPL-MCNC: 11 MG/DL (ref 7–30)
CALCIUM SERPL-MCNC: 7.9 MG/DL (ref 8.5–10.1)
CHLORIDE SERPL-SCNC: 114 MMOL/L (ref 94–109)
CO2 SERPL-SCNC: 17 MMOL/L (ref 20–32)
CREAT SERPL-MCNC: 1 MG/DL (ref 0.66–1.25)
ERYTHROCYTE [DISTWIDTH] IN BLOOD BY AUTOMATED COUNT: 19.6 % (ref 10–15)
GFR SERPL CREATININE-BSD FRML MDRD: 84 ML/MIN/{1.73_M2}
GLUCOSE SERPL-MCNC: 105 MG/DL (ref 70–99)
HCT VFR BLD AUTO: 27.3 % (ref 40–53)
HGB BLD-MCNC: 9.2 G/DL (ref 13.3–17.7)
INR PPP: 2.15 (ref 0.86–1.14)
MCH RBC QN AUTO: 36.1 PG (ref 26.5–33)
MCHC RBC AUTO-ENTMCNC: 33.7 G/DL (ref 31.5–36.5)
MCV RBC AUTO: 107 FL (ref 78–100)
PLATELET # BLD AUTO: 60 10E9/L (ref 150–450)
POTASSIUM SERPL-SCNC: 4.2 MMOL/L (ref 3.4–5.3)
PROT SERPL-MCNC: 4.6 G/DL (ref 6.8–8.8)
RBC # BLD AUTO: 2.55 10E12/L (ref 4.4–5.9)
SODIUM SERPL-SCNC: 140 MMOL/L (ref 133–144)
SODIUM SERPL-SCNC: 141 MMOL/L (ref 133–144)
WBC # BLD AUTO: 3.9 10E9/L (ref 4–11)

## 2019-03-21 PROCEDURE — 25000132 ZZH RX MED GY IP 250 OP 250 PS 637: Performed by: INTERNAL MEDICINE

## 2019-03-21 PROCEDURE — 99232 SBSQ HOSP IP/OBS MODERATE 35: CPT | Mod: GC | Performed by: PEDIATRICS

## 2019-03-21 PROCEDURE — 80053 COMPREHEN METABOLIC PANEL: CPT | Performed by: STUDENT IN AN ORGANIZED HEALTH CARE EDUCATION/TRAINING PROGRAM

## 2019-03-21 PROCEDURE — 25000132 ZZH RX MED GY IP 250 OP 250 PS 637: Performed by: STUDENT IN AN ORGANIZED HEALTH CARE EDUCATION/TRAINING PROGRAM

## 2019-03-21 PROCEDURE — 25000128 H RX IP 250 OP 636: Performed by: STUDENT IN AN ORGANIZED HEALTH CARE EDUCATION/TRAINING PROGRAM

## 2019-03-21 PROCEDURE — 84295 ASSAY OF SERUM SODIUM: CPT | Performed by: STUDENT IN AN ORGANIZED HEALTH CARE EDUCATION/TRAINING PROGRAM

## 2019-03-21 PROCEDURE — 25000132 ZZH RX MED GY IP 250 OP 250 PS 637

## 2019-03-21 PROCEDURE — 85027 COMPLETE CBC AUTOMATED: CPT | Performed by: STUDENT IN AN ORGANIZED HEALTH CARE EDUCATION/TRAINING PROGRAM

## 2019-03-21 PROCEDURE — 12000012 ZZH R&B MS OVERFLOW UMMC

## 2019-03-21 PROCEDURE — 25800030 ZZH RX IP 258 OP 636: Performed by: STUDENT IN AN ORGANIZED HEALTH CARE EDUCATION/TRAINING PROGRAM

## 2019-03-21 PROCEDURE — 85610 PROTHROMBIN TIME: CPT | Performed by: STUDENT IN AN ORGANIZED HEALTH CARE EDUCATION/TRAINING PROGRAM

## 2019-03-21 PROCEDURE — 36415 COLL VENOUS BLD VENIPUNCTURE: CPT | Performed by: STUDENT IN AN ORGANIZED HEALTH CARE EDUCATION/TRAINING PROGRAM

## 2019-03-21 RX ADMIN — RIFAXIMIN 550 MG: 550 TABLET ORAL at 19:39

## 2019-03-21 RX ADMIN — SODIUM BICARBONATE 650 MG TABLET 1300 MG: at 19:39

## 2019-03-21 RX ADMIN — SODIUM BICARBONATE 650 MG TABLET 1300 MG: at 08:18

## 2019-03-21 RX ADMIN — LACTULOSE 20 G: 10 POWDER, FOR SOLUTION ORAL at 14:25

## 2019-03-21 RX ADMIN — LACTULOSE 20 G: 10 POWDER, FOR SOLUTION ORAL at 08:19

## 2019-03-21 RX ADMIN — POTASSIUM CHLORIDE 60 MEQ: 1500 TABLET, EXTENDED RELEASE ORAL at 08:18

## 2019-03-21 RX ADMIN — LACTULOSE 20 G: 10 POWDER, FOR SOLUTION ORAL at 19:39

## 2019-03-21 RX ADMIN — MELATONIN TAB 3 MG 3 MG: 3 TAB at 01:04

## 2019-03-21 RX ADMIN — THERA TABS 1 TABLET: TAB at 08:18

## 2019-03-21 RX ADMIN — SODIUM BICARBONATE 650 MG TABLET 1300 MG: at 14:25

## 2019-03-21 RX ADMIN — PANTOPRAZOLE SODIUM 40 MG: 40 TABLET, DELAYED RELEASE ORAL at 08:18

## 2019-03-21 RX ADMIN — THIAMINE HYDROCHLORIDE 500 MG: 100 INJECTION, SOLUTION INTRAMUSCULAR; INTRAVENOUS at 08:14

## 2019-03-21 RX ADMIN — MELATONIN TAB 3 MG 3 MG: 3 TAB at 19:39

## 2019-03-21 RX ADMIN — RIFAXIMIN 550 MG: 550 TABLET ORAL at 08:18

## 2019-03-21 RX ADMIN — FOLIC ACID 1 MG: 1 TABLET ORAL at 08:18

## 2019-03-21 RX ADMIN — POTASSIUM CHLORIDE 60 MEQ: 1500 TABLET, EXTENDED RELEASE ORAL at 19:40

## 2019-03-21 ASSESSMENT — ACTIVITIES OF DAILY LIVING (ADL)
ADLS_ACUITY_SCORE: 13
ADLS_ACUITY_SCORE: 14
ADLS_ACUITY_SCORE: 13

## 2019-03-21 ASSESSMENT — MIFFLIN-ST. JEOR: SCORE: 1568.37

## 2019-03-21 NOTE — PLAN OF CARE
"/62 (BP Location: Left arm)   Pulse 75   Temp 98.4  F (36.9  C) (Oral)   Resp 20   Ht 1.778 m (5' 10\")   Wt 76.8 kg (169 lb 6.4 oz)   SpO2 100%   BMI 24.31 kg/m       VS: stable on room air.   BG: n/a.   LABS: Na 142 and ___; q 6 hr Na checks.   Pain: denied.   PRN: n/a.  Nausea: denied.   Diet: 2 gm Na diet with fair appetite.   LDA: PIV with D5 @ 100 ml/hr and TID thiamine infusions.   GI/: voiding and having loose BMs (on scheduled lactulose).   Skin: jaundice and ecchymotic.   Mobility: up independently in room.   Education: reminded to call for assistance when getting out of bed.   Plan: Possible discharge Friday; continue POC.    Bed alarm on and VPM for additional safety measures. Pt had very little evidence of confusion this evening other than time. Rested most of the shift between cares.     All care explained and questions answered. Will continue to monitor and notify team of changes.     "

## 2019-03-21 NOTE — PROGRESS NOTES
[facility], [place]    History and Physical - Mardarcy 2 Service        Date of Admission:  3/10/2019    Assessment & Plan   Ivan Bell is a 53 year old male with history of NAFLD cirrhosis s/p TIPS who presents with superimposed EtOH hepaitis w/o ascites in setting of relapsed alcohol use and AMBER.     TODAY  - Stopped D5W   - Discussed tylenol use  - Will likely discharge home tomorrow     # NAFLD cirrhosis  # Alcohol hepatis, superimposed  # Alcohol use disorder  Patient with confusion during hospitalization (infectious workup and CT head negative) which could be secondary to Prednisolone considering his confusion has improved over last 24-48 hours after stopping Prednisolone. Will likely discharge home tomorrow.   - Hepatology following, appreciate recs  - OT consult: 24-hour assist at home   - Prednisolone discontinued 3/19 due to Lille score   - Continue Lactulose   - D5W discontinued for borderline hypernatremia   - 500mg IV Thiamine TID x7 days (if discharges earlier, high dose replacement for remainder)  - Continue folic acid and MV    - Daily MELD labs   - Daily weights, 2g sodium restricted diet  - PPI once daily     #Hypokalemia  #RTA Type II    #NAGMA, improving   Patient requiring ~330mEq of potassium upon admission to bring potassium from 2.1 --> 3.7. Urine potassium of 40 and low bicarbonate. Thought to be secondary to RTA Type II in setting of pre-renal injury --> ATN.   - Renal consult, appreciate recs   - Potassium 60mEq BID   - Sodium bicarb 1300mg TID   - Daily BMP    # AMBER, improved  Likely pre-renal in setting of poor po intake and high EtOH intake prior to admission.   - Continue to monitor     # Nicotine use disorder  Has 60 year pack history, quit smoking in 1994 but he still uses chewing tobacco. He is interested in quitting nicotine altogether and understands cancer risk associated with smokeless tobacco. Not interested in any nicotine replacement at this  time.      Diet: 2g restricted sodium diet  Fluids: None   DVT Prophylaxis: VTE Prophylaxis contraindicated due to elevated INR  Blue Catheter: not present  Code Status: Full, discussed with patient and wife    Disposition Plan   Expected discharge: Likely tomorrow to  prior living arrangement with 24 hour supervision.   Entered: Leonarda Cabrera MD 03/21/2019, 5:44 PM     Assessment and plan discussed with my attending, Dr. Gray.      Leonarda Cabrera,   PGY1 Internal Medicine  [phone]  ______________________________________________________________________    Subjective  No acute events overnight. Patient's confusion much improved this AM. States he feels well and wants to go home. All ROS negative.     Physical Exam   Vital Signs: Temp: 97.8  F (36.6  C) Temp src: Oral BP: 135/63 Pulse: 85 Heart Rate: 85 Resp: 16 SpO2: 100 % O2 Device: None (Room air)    Weight: 160 lbs 4.8 oz    General Appearance: Jaundiced, alert and oriented x 3  Eyes: Scleral icterus, PERRLA, EOMI  HEENT: Normocephalic, scleral icterus, MMM   Respiratory: CTAB   Cardiovascular: RRR, systolic murmur  GI: Nontender, slightly distended   Skin: Jaundice, no rashes on limited exam, no telangiectasias, spider angiomas.   Neurologic: no asterixis, CN 2-12 grossly intact    Labs: Reviewed in Epic

## 2019-03-21 NOTE — PLAN OF CARE
"/60 (BP Location: Left arm)   Pulse 74   Temp 98.2  F (36.8  C) (Oral)   Resp 18   Ht 1.778 m (5' 10\")   Wt 72.7 kg (160 lb 4.8 oz)   SpO2 100%   BMI 23.00 kg/m      Patient VSS on RA, afebrile. A&O x4, [place] protocol scoring 0, scheduled lactulose given. VPM active, bed alarm active. Denies pain. Tolerating 2g Na+ diet with good appetite. L PIV-SL, IV thiamine given, discontinued IVF. BM today, voiding but not saving. Up with SBA. Educated for medication regimen, completed med card to take home per wife's request. Plan to discharge to home tomorrow. Will continue with POC and notify MD with changes or concerns.    "

## 2019-03-21 NOTE — PLAN OF CARE
Ivan appears more alert this night. Has used his call light at times for bathroom assist. Has had 2 loose stools this shift. Last sodium was 140. Continues on the D-5 at 100cc/hr. Did set off VPM a couple of times, but he stated he was just sitting up. Ivan expressed that he is hoping to go home today. [place] score was '0 and has not required any PRN dose of lactulose. Will continue to monitor and will report any significant changes.

## 2019-03-22 VITALS
WEIGHT: 160.3 LBS | HEART RATE: 85 BPM | OXYGEN SATURATION: 100 % | HEIGHT: 70 IN | RESPIRATION RATE: 18 BRPM | SYSTOLIC BLOOD PRESSURE: 133 MMHG | TEMPERATURE: 98.8 F | DIASTOLIC BLOOD PRESSURE: 75 MMHG | BODY MASS INDEX: 22.95 KG/M2

## 2019-03-22 LAB
ALBUMIN SERPL-MCNC: 2.4 G/DL (ref 3.4–5)
ALP SERPL-CCNC: 282 U/L (ref 40–150)
ALT SERPL W P-5'-P-CCNC: 63 U/L (ref 0–70)
ANION GAP SERPL CALCULATED.3IONS-SCNC: 7 MMOL/L (ref 3–14)
AST SERPL W P-5'-P-CCNC: 144 U/L (ref 0–45)
BILIRUB SERPL-MCNC: 30.4 MG/DL (ref 0.2–1.3)
BUN SERPL-MCNC: 10 MG/DL (ref 7–30)
CALCIUM SERPL-MCNC: 8.1 MG/DL (ref 8.5–10.1)
CHLORIDE SERPL-SCNC: 112 MMOL/L (ref 94–109)
CO2 SERPL-SCNC: 21 MMOL/L (ref 20–32)
CREAT SERPL-MCNC: 0.92 MG/DL (ref 0.66–1.25)
ERYTHROCYTE [DISTWIDTH] IN BLOOD BY AUTOMATED COUNT: 19.2 % (ref 10–15)
GFR SERPL CREATININE-BSD FRML MDRD: >90 ML/MIN/{1.73_M2}
GLUCOSE SERPL-MCNC: 80 MG/DL (ref 70–99)
HCT VFR BLD AUTO: 28.5 % (ref 40–53)
HGB BLD-MCNC: 9.6 G/DL (ref 13.3–17.7)
MCH RBC QN AUTO: 36 PG (ref 26.5–33)
MCHC RBC AUTO-ENTMCNC: 33.7 G/DL (ref 31.5–36.5)
MCV RBC AUTO: 107 FL (ref 78–100)
PLATELET # BLD AUTO: 62 10E9/L (ref 150–450)
POTASSIUM SERPL-SCNC: 4 MMOL/L (ref 3.4–5.3)
PROT SERPL-MCNC: 4.8 G/DL (ref 6.8–8.8)
RBC # BLD AUTO: 2.67 10E12/L (ref 4.4–5.9)
SODIUM SERPL-SCNC: 140 MMOL/L (ref 133–144)
WBC # BLD AUTO: 4.1 10E9/L (ref 4–11)

## 2019-03-22 PROCEDURE — 25000132 ZZH RX MED GY IP 250 OP 250 PS 637: Performed by: INTERNAL MEDICINE

## 2019-03-22 PROCEDURE — 25000132 ZZH RX MED GY IP 250 OP 250 PS 637: Performed by: STUDENT IN AN ORGANIZED HEALTH CARE EDUCATION/TRAINING PROGRAM

## 2019-03-22 PROCEDURE — 80053 COMPREHEN METABOLIC PANEL: CPT | Performed by: STUDENT IN AN ORGANIZED HEALTH CARE EDUCATION/TRAINING PROGRAM

## 2019-03-22 PROCEDURE — 25000132 ZZH RX MED GY IP 250 OP 250 PS 637

## 2019-03-22 PROCEDURE — 36415 COLL VENOUS BLD VENIPUNCTURE: CPT | Performed by: STUDENT IN AN ORGANIZED HEALTH CARE EDUCATION/TRAINING PROGRAM

## 2019-03-22 PROCEDURE — 99238 HOSP IP/OBS DSCHRG MGMT 30/<: CPT | Mod: GC | Performed by: PEDIATRICS

## 2019-03-22 PROCEDURE — 85027 COMPLETE CBC AUTOMATED: CPT | Performed by: STUDENT IN AN ORGANIZED HEALTH CARE EDUCATION/TRAINING PROGRAM

## 2019-03-22 RX ORDER — POTASSIUM CHLORIDE 1500 MG/1
20 TABLET, EXTENDED RELEASE ORAL 2 TIMES DAILY
Qty: 120 TABLET | Refills: 0 | Status: SHIPPED | OUTPATIENT
Start: 2019-03-22 | End: 2019-05-24

## 2019-03-22 RX ORDER — LANOLIN ALCOHOL/MO/W.PET/CERES
100 CREAM (GRAM) TOPICAL 3 TIMES DAILY
Qty: 42 TABLET | Refills: 0 | Status: SHIPPED | OUTPATIENT
Start: 2019-03-22 | End: 2019-04-01

## 2019-03-22 RX ORDER — FOLIC ACID 1 MG/1
1 TABLET ORAL DAILY
Qty: 30 TABLET | Refills: 0 | Status: SHIPPED | OUTPATIENT
Start: 2019-03-22 | End: 2019-04-17

## 2019-03-22 RX ORDER — PANTOPRAZOLE SODIUM 40 MG/1
40 TABLET, DELAYED RELEASE ORAL
Qty: 30 TABLET | Refills: 0 | Status: SHIPPED | OUTPATIENT
Start: 2019-03-22 | End: 2019-04-17

## 2019-03-22 RX ORDER — LANOLIN ALCOHOL/MO/W.PET/CERES
100 CREAM (GRAM) TOPICAL DAILY
Qty: 30 TABLET | Refills: 0 | Status: SHIPPED | OUTPATIENT
Start: 2019-04-06 | End: 2019-06-14

## 2019-03-22 RX ORDER — SODIUM BICARBONATE 650 MG/1
1300 TABLET ORAL 3 TIMES DAILY
Qty: 90 TABLET | Refills: 0 | Status: SHIPPED | OUTPATIENT
Start: 2019-03-22 | End: 2019-04-12

## 2019-03-22 RX ADMIN — SODIUM BICARBONATE 650 MG TABLET 1300 MG: at 07:55

## 2019-03-22 RX ADMIN — FOLIC ACID 1 MG: 1 TABLET ORAL at 07:56

## 2019-03-22 RX ADMIN — LACTULOSE 20 G: 10 POWDER, FOR SOLUTION ORAL at 07:56

## 2019-03-22 RX ADMIN — THERA TABS 1 TABLET: TAB at 07:56

## 2019-03-22 RX ADMIN — PANTOPRAZOLE SODIUM 40 MG: 40 TABLET, DELAYED RELEASE ORAL at 07:55

## 2019-03-22 RX ADMIN — POTASSIUM CHLORIDE 60 MEQ: 1500 TABLET, EXTENDED RELEASE ORAL at 07:55

## 2019-03-22 RX ADMIN — RIFAXIMIN 550 MG: 550 TABLET ORAL at 07:55

## 2019-03-22 ASSESSMENT — ACTIVITIES OF DAILY LIVING (ADL)
ADLS_ACUITY_SCORE: 13

## 2019-03-22 NOTE — PLAN OF CARE
Ivan appeared to sleep soundly between cares tonight. Has been alert and oriented. No c/o of discomfort or pain. Reyna has bee '0'. Will continue on scheduled lactulose and give prn lactulose when needed.

## 2019-03-22 NOTE — PLAN OF CARE
"/54 (BP Location: Left arm)   Pulse 85   Temp 98.5  F (36.9  C) (Oral)   Resp 16   Ht 1.778 m (5' 10\")   Wt 72.7 kg (160 lb 4.8 oz)   SpO2 99%   BMI 23.00 kg/m       VS: stable on room air.   BG: n/a.   LABS: see chart.   Pain: denied.   PRN: n/a.  Nausea: denied.   Diet: 2 gm Na diet with good appetite.   LDA: PIV saline locked .   GI/: voiding and having BMs but not saving.   Skin: slightly jaundice, but improving.   Mobility: up independently in room.   Plan: plan to discharge home tomorrow with family.    [place] score = 0. Pt off on date but otherwise appropriate.    Bed alarm and VPM discontinued this afternoon, pt appropriate and stable to be up on own.    All care explained and questions answered. Will continue to monitor and notify team of changes.     "

## 2019-03-22 NOTE — PLAN OF CARE
Occupational Therapy Discharge Summary    Reason for therapy discharge:    Discharged to home.    Progress towards therapy goal(s). See goals on Care Plan in [facility] electronic health record for goal details.  Goals partially met.  Barriers to achieving goals:   Pt appears to have plateau with cognitive function or presents with cognitive deficits that may be better treated medically .    Therapy recommendation(s):    Futher assessment warrented at different time to assess for cognitive deficits.

## 2019-03-22 NOTE — PLAN OF CARE
Patient discharged home accompanied by his wife. PIV DC'd, discharge orders and medications reviewed with Ivan and his wife. Medications to be picked up at outside Pharmacy. All belongings with the patient. No lines or drains in place.

## 2019-03-24 ENCOUNTER — MYC REFILL (OUTPATIENT)
Dept: INTERNAL MEDICINE | Facility: CLINIC | Age: 54
End: 2019-03-24

## 2019-03-24 DIAGNOSIS — K76.82 HEPATIC ENCEPHALOPATHY (H): ICD-10-CM

## 2019-03-24 NOTE — DISCHARGE SUMMARY
Medicine Discharge Summary  Ivan Bell MRN: [id number]  1965  Primary care provider: Rosette Wills  ___________________________________          Date of Admission:  3/10/2019  Date of Discharge:  3/24/2019  Admitting Physician:  Cande Styles MD  Discharge Physician:  Kiera Gray MD   Discharging Service:  Internal Medicine, [place] [id number]     Primary Provider: Rosette Wills         Reason for Admission:   53 year old male with history of NAFLD cirrhosis s/p TIPS who presents with superimposed EtOH hepatitis without significant ascites in setting of relapsed alcohol use and additionally with AMBER.          Discharge Diagnosis:   Alcoholic hepatitis          Procedures & Significant Findings:   N/A         Consultations:   Hepatology   Psychiatry  Nephrology  Procedure Team  PT  OT  Nutrition        Relevant Results:   Sodium: 140 (3/22)  Potassium: 2.1 (3/10) --> 4.0 (3/22)   Bicarbonate: 21 (3/22)  Creatinine: 1.68 (3/10) --> 0.92 (3/22)  Total bilirubin: 28.6 (3/10) --> 44.2 (peak on 3/15) --> 30.4 (3/22)   INR: 2.15 (3/22)     Abdominal ultrasound (3/10):   Impression:   1. Significant increase in TIPS stent velocities concerning for  stenosis. Flow within the stent demonstrates significant aliasing,  which was seen on the prior exam. The highest velocity measures 279  cm/sec in the mid stent, previously 159 cm/sec.  2. Cirrhotic morphology of the liver with evidence of portal  hypertension, including splenomegaly and mild ascites.  3. Limited examination with nonvisualization of the gallbladder and  pancreas, likely due to bowel gas.  4. Bilateral renal stones.    Abdominal ultrasound (3/13):   Impression: Mild ascites noted in all four quadrants.  Not  substantially changed since 3/10/19.     CXR (3/14):   IMPRESSION:  Bilateral interstitial opacities consistent with edema.    CT head w/o contrast (3/14):   Impression: No acute  intracranial pathology.         Hospital Course by Problem:    # Superimposed EtOH Hepatitis   # NAFLD cirrhosis   # Alcohol use disorder  Patient had been sober a total of 18 months, but he started to relapse ~2 months prior to admission. Patient presented with labs suggestive of alcoholic hepatitis: AST three times greater than ALT, direct hyperbilirubinemia, and alkaline phosphatemia. Abdominal ultrasound showed increased stent velocities concerning for stenosis, but no thrombosis. Patient's Maddrey score was 88 upon admission. Steroids were not started right away after discussion with hepatology. Patient's MELD score continued to increase during hospitalization and patient had waxing/waning confusion. Addition of steroids was readdressed with hepatology team. Prednisolone was started on 3/13. The patient's Lille score was calculated after 7-day course of Prednisolone, and due to the results, Prednisolone was discontinued. While the patient was receiving Prednisolone, he had increased confusion.  Infectious workup, CT head, and repeat abdominal ultrasound negative for acute etiology. Patient has some ascites on abdominal ultrasound, so the procedure team was consulted to rule out SBP. However, there was not enough fluid within the abdomen for them to obtain a sample. Patient's sodium was borderline elevated (144), so he was treated with D5W. Wernicke's encephalopathy was also considered, so he was started on Thiamine. Patient's confusion improved 24-48 hours after stopping prednisolone. PT/OT were consulted due to patient's confusion who recommended that patient go home with 24-hour assist. Discussion was had with patient and wife upon discharge that he is not to drive, operate machinery, or electric tools before he is cleared by his PCP or hepatologist.   Additionally, patient's EtOH use disorder was addressed during this hospitalization. Psychiatry was consulted and recommended outpatient chemical dependency  "treatment. Patient plans on going to local treatment center upon discharge. PCP could consider outpatient pharmacologic treatment of substance use disorder if deemed appropriate.     #Hypokalemia  #NAGMA  #RTA Type II  #AMBER  Patient required 330mEq of potassium upon admission to bring potassium to 2.1 --> 3.7. Urine potassium was 40. Additionally, patient had low bicarbonate and a NAGMA. Nephrology was consulted and agreed with medicine team that patient likely developed RTA Type II in setting of pre-renal injury --> AMBER. Patient was discharged on 60mEq potassium BID and 1300mg sodium bicarbonate TID. The patient should have repeat labs in 1 week to reassess regimen.     #Non-severe malnutrition in setting of acute on chronic illness   Nutrition consulted during hospitalization to address patient's nutrition status. Recommend that patient continues multivitamins, folic acid and thiamine upon discharge and encourage supplement use (Boost).     Follow up plan summary  1. Repeat CMP in 1 week   2. Follow up with PCP in 1-2 weeks to discuss potassium and bicarbonate dosing. Also, evaluate the appropriateness of medications for EtOH use disorder.   3. Continue lactulose  4. Follow-up with hepatologist, Dr. Bales in 2-4 weeks.   5. Do not drive, operate machinery or electronic tools before cleared by PCP or Dr. Bales.   6. Go to outpatient chemical dependency   7. Do not exceed use >1gram Tylenol per day     Physical Exam on day of Discharge:  Blood pressure 133/75, pulse 85, temperature 98.8  F (37.1  C), temperature source Oral, resp. rate 18, height 1.778 m (5' 10\"), weight 72.7 kg (160 lb 4.8 oz), SpO2 100 %.    General Appearance: Jaundiced, alert and oriented x 3  Eyes: Scleral icterus, PERRLA, EOMI  HEENT: Normocephalic, MMM  Respiratory: CTAB   Cardiovascular: RRR, systolic murmur  GI: Nontender, slightly distended   Skin: Jaundice, no telangiectasias or spider angiomas.   Neurologic: no asterixis, CN 2-12 grossly " intact    Lines/Tubes:  None            Discharge Medications:     Discharge Medication List as of 3/22/2019 12:26 PM      START taking these medications    Details   folic acid (FOLVITE) 1 MG tablet Take 1 tablet (1 mg) by mouth daily, Disp-30 tablet, R-0, Local Print      pantoprazole (PROTONIX) 40 MG EC tablet Take 1 tablet (40 mg) by mouth every morning (before breakfast), Disp-30 tablet, R-0, Local Print      sodium bicarbonate 650 MG tablet Take 2 tablets (1,300 mg) by mouth 3 times daily, Disp-90 tablet, R-0, Local Print      !! vitamin B1 (THIAMINE) 100 MG tablet Take 1 tablet (100 mg) by mouth 3 times daily for 14 days, Disp-42 tablet, R-0, Local Print      !! vitamin B1 (THIAMINE) 100 MG tablet Take 1 tablet (100 mg) by mouth daily, Disp-30 tablet, R-0, Local Print       !! - Potential duplicate medications found. Please discuss with provider.      CONTINUE these medications which have CHANGED    Details   potassium chloride ER (K-DUR/KLOR-CON M) 20 MEQ CR tablet Take 1 tablet (20 mEq) by mouth 2 times daily, Disp-120 tablet, R-0, Local Print         CONTINUE these medications which have NOT CHANGED    Details   lactulose (CEPHULAC) 20 GM Packet Take 1 packet (20 g) by mouth 4 times daily Goal of 3-4 loose BMs per day, extra doses if concerned for confusion or not at goal, may hold for that day if reaches goal, Disp-300 each, R-0, Local Print      MULTIPLE VITAMINS PO Take 1 tablet by mouth daily, Historical      rifaximin (XIFAXAN) 550 MG TABS tablet Take 1 tablet (550 mg) by mouth 2 times daily, Disp-180 tablet, R-0, E-Prescribe      sildenafil (REVATIO) 20 MG tablet Take 1-3  tablet (20 mg) by mouth daily as needed., Disp-90 tablet, R-3, E-Prescribe         STOP taking these medications       Acetaminophen (TYLENOL PO) Comments:   Reason for Stopping:                    Discharge Instructions and Follow-Up:     Discharge Procedure Orders   Physical Therapy Referral   Standing Status: Future Standing  Exp. Date: 20   Referral Type: Rehab Therapy Physical Therapy   Number of Visits Requested: 1     Reason for your hospital stay   Order Comments: You were hospitalized for alcoholic hepatitis secondary to alcohol use. During admission, you had very elevated total bilirubin levels, low potassium and low bicarbonate levels.     Follow Up and recommended labs and tests   Order Comments: Follow up with Dr. Bales , at  [facility], within 2-4 weeks  to evaluate medication change and to evaluate treatment change. The following labs/tests are recommended: CMP and INR.     Adult [facility] Follow-up and recommended labs and tests   Order Comments: Follow up with primary care provider, Rosette Wills, within 7 days to evaluate medication change.  The following labs/tests are recommended: CMP, INR.      Appointments on [place] and/or [facility] (with [facility] or [facility] provider or service). Call [phone] if you haven't heard regarding these appointments within 7 days of discharge.     Activity   Order Comments: Your activity upon discharge: activity as tolerated     Order Specific Question Answer Comments   Is discharge order? Yes      Discharge Instructions   Order Comments: You were admitted for alcoholic hepatitis. Upon discharge you should do the followin) Start taking sodium bicarbonate, folic acid, potassium chloride, thiamine, and Protonix as prescribed.   2) You should schedule a follow-up with your primary care doctor in ~7 days and have repeat labs. They should discuss your potassium and bicarbonate levels with you.   3) You should attempt sobriety and attempt to find a treatment group/facility.   4) You should not take more than 2 grams of Tylenol per day.   5) You should have 24-hour assistance at home and help with medications until your confusion improves.   6) You should not drive, operate machinery or tools until you have met with either your primary  care physician or Dr. Bales and they have deemed that it is safe for you to do those things.     Full Code     Order Specific Question Answer Comments   Code status determined by: Discussion with patient/legal decision maker      Diet   Order Comments: Follow this diet upon discharge: Orders Placed This Encounter      Snacks/Supplements Adult: Other; Send peach Breeze at 10 AM snack time and pineapple Gelatein Plus AT 8 PM snack time; Between Meals      2 Gram Sodium Diet     Order Specific Question Answer Comments   Is discharge order? Yes              Discharge Disposition:   Home with 24 hour surveillance          Condition on Discharge:   Discharge condition: Stable    Code status on discharge: Full         Date of service: 3/24/2019    The patient was discussed with Dr. Gray.    Leonarda Cabrera DO  PGY-1 Internal Medicine  Pager [phone]

## 2019-03-25 ENCOUNTER — MYC MEDICAL ADVICE (OUTPATIENT)
Dept: GASTROENTEROLOGY | Facility: CLINIC | Age: 54
End: 2019-03-25

## 2019-03-25 ENCOUNTER — TELEPHONE (OUTPATIENT)
Dept: GASTROENTEROLOGY | Facility: CLINIC | Age: 54
End: 2019-03-25

## 2019-03-25 NOTE — TELEPHONE ENCOUNTER
[facility] Call Center    Phone Message    May a detailed message be left on voicemail: yes    Reason for Call: Other: Pt was instructed to follow up with his hepatologist, Dr Bales 2-4 weeks after discharge from the hospital (on 3/22) Dr Bales's next openings are currently in July. Please call pt to discuss, thanks!     Action Taken: Message routed to:  [facility] ([facility] Hepatology

## 2019-03-28 DIAGNOSIS — K76.82 HEPATIC ENCEPHALOPATHY (H): ICD-10-CM

## 2019-03-28 NOTE — TELEPHONE ENCOUNTER
Routing refill request to provider for review/approval because:  Drug not on the [facility] refill protocol  A break in medication     Requested Prescriptions   Pending Prescriptions Disp Refills     rifaximin (XIFAXAN) 550 MG TABS tablet 180 tablet 0     Sig: Take 1 tablet (550 mg) by mouth 2 times daily    There is no refill protocol information for this order        rifaximin (XIFAXAN) 550 MG TABS tablet  Last Written Prescription Date:  6/12/18  Last Fill Quantity: 180,  # refills: 0   Last office visit: 9/21/2018 with prescribing provider:  Dr. Wills   Future Office Visit:   Next 5 appointments (look out 90 days)    Apr 01, 2019  2:40 PM CDT  SHORT with Rosette Wills MD  [facility] [address] [address] [phone]         Aleja DEWEY RN

## 2019-04-01 ENCOUNTER — OFFICE VISIT (OUTPATIENT)
Dept: FAMILY MEDICINE | Facility: CLINIC | Age: 54
End: 2019-04-01
Payer: COMMERCIAL

## 2019-04-01 VITALS
HEART RATE: 80 BPM | HEIGHT: 70 IN | WEIGHT: 169 LBS | BODY MASS INDEX: 24.2 KG/M2 | RESPIRATION RATE: 16 BRPM | TEMPERATURE: 97.6 F | DIASTOLIC BLOOD PRESSURE: 60 MMHG | SYSTOLIC BLOOD PRESSURE: 122 MMHG

## 2019-04-01 DIAGNOSIS — R17 JAUNDICE: ICD-10-CM

## 2019-04-01 DIAGNOSIS — E55.9 VITAMIN D DEFICIENCY: ICD-10-CM

## 2019-04-01 DIAGNOSIS — D63.8 ANEMIA OF CHRONIC DISEASE: ICD-10-CM

## 2019-04-01 DIAGNOSIS — K70.31 ALCOHOLIC CIRRHOSIS OF LIVER WITH ASCITES (H): Primary | ICD-10-CM

## 2019-04-01 DIAGNOSIS — K76.89 HEPATIC DYSFUNCTION: ICD-10-CM

## 2019-04-01 DIAGNOSIS — I85.10 SECONDARY ESOPHAGEAL VARICES WITHOUT BLEEDING (H): ICD-10-CM

## 2019-04-01 LAB
ALBUMIN SERPL-MCNC: 2.4 G/DL (ref 3.4–5)
ALP SERPL-CCNC: 328 U/L (ref 40–150)
ALT SERPL W P-5'-P-CCNC: 30 U/L (ref 0–70)
ANION GAP SERPL CALCULATED.3IONS-SCNC: 8 MMOL/L (ref 3–14)
AST SERPL W P-5'-P-CCNC: 66 U/L (ref 0–45)
BILIRUB SERPL-MCNC: 32.6 MG/DL (ref 0.2–1.3)
BUN SERPL-MCNC: 9 MG/DL (ref 7–30)
CALCIUM SERPL-MCNC: 8.4 MG/DL (ref 8.5–10.1)
CHLORIDE SERPL-SCNC: 108 MMOL/L (ref 94–109)
CO2 SERPL-SCNC: 23 MMOL/L (ref 20–32)
CREAT SERPL-MCNC: 1.14 MG/DL (ref 0.66–1.25)
ERYTHROCYTE [DISTWIDTH] IN BLOOD BY AUTOMATED COUNT: 17 % (ref 10–15)
GFR SERPL CREATININE-BSD FRML MDRD: 72 ML/MIN/{1.73_M2}
GLUCOSE SERPL-MCNC: 124 MG/DL (ref 70–99)
HCT VFR BLD AUTO: 31.9 % (ref 40–53)
HGB BLD-MCNC: 10.6 G/DL (ref 13.3–17.7)
INR PPP: 1.8 (ref 0.86–1.14)
MCH RBC QN AUTO: 34.4 PG (ref 26.5–33)
MCHC RBC AUTO-ENTMCNC: 33.2 G/DL (ref 31.5–36.5)
MCV RBC AUTO: 104 FL (ref 78–100)
PLATELET # BLD AUTO: 72 10E9/L (ref 150–450)
POTASSIUM SERPL-SCNC: 3.9 MMOL/L (ref 3.4–5.3)
PROT SERPL-MCNC: 5.5 G/DL (ref 6.8–8.8)
RBC # BLD AUTO: 3.08 10E12/L (ref 4.4–5.9)
SODIUM SERPL-SCNC: 139 MMOL/L (ref 133–144)
WBC # BLD AUTO: 5.2 10E9/L (ref 4–11)

## 2019-04-01 PROCEDURE — 82306 VITAMIN D 25 HYDROXY: CPT | Performed by: FAMILY MEDICINE

## 2019-04-01 PROCEDURE — 85610 PROTHROMBIN TIME: CPT | Performed by: FAMILY MEDICINE

## 2019-04-01 PROCEDURE — 99215 OFFICE O/P EST HI 40 MIN: CPT | Performed by: FAMILY MEDICINE

## 2019-04-01 PROCEDURE — 85027 COMPLETE CBC AUTOMATED: CPT | Performed by: FAMILY MEDICINE

## 2019-04-01 PROCEDURE — 36415 COLL VENOUS BLD VENIPUNCTURE: CPT | Performed by: FAMILY MEDICINE

## 2019-04-01 PROCEDURE — 80053 COMPREHEN METABOLIC PANEL: CPT | Performed by: FAMILY MEDICINE

## 2019-04-01 ASSESSMENT — PAIN SCALES - GENERAL: PAINLEVEL: NO PAIN (0)

## 2019-04-01 ASSESSMENT — MIFFLIN-ST. JEOR: SCORE: 1607.83

## 2019-04-01 NOTE — PATIENT INSTRUCTIONS
*   Will check blood tests today, including potassium and vitamin D.     *   Stay on your current medications.     *   I think a group is very important to stay sober. I don't think you can stay away from alcohol on your own.     *   Learn from past mistakes.     *   Try small meals, eat what you can.     *    See me again in 2 weeks.

## 2019-04-01 NOTE — PROGRESS NOTES
SUBJECTIVE:   Ivan Bell is a 53 year old male who presents to clinic today for the following health issues:      Hospital Follow-up Visit:    Hospital/Nursing Home/IP Rehab Facility: [facility]  Date of Admission: 3/10/2019  Date of Discharge: 3/22/2019  Reason(s) for Admission: Alcoholic hepatitis             Problems taking medications regularly:  None       Medication changes since discharge: Folic acid 1mg every day, pantoprazole 40mg every day, sodium bicarbonate 1,300mg tid, vitamin B1 100mg every day. Potassium chloride ER was changed to 20mEq bid       Problems adhering to non-medication therapy:  None    Summary of hospitalization:  [facility] discharge summary reviewed  Diagnostic Tests/Treatments reviewed.  Follow up needed: none  Other Healthcare Providers Involved in Patient s Care:         None  Update since discharge: Since returning home he is very fatigued. He does have a good appetite but states that food makes him feel ill. He is having diarrhea, no constipation, urinary sx or nausea. Discuss dosage of sodium bicarbonate. He denies any pain today but states that last night he did have some epigastric pain which felt like gas.    Post Discharge Medication Reconciliation: discharge medications reconciled, continue medications without change.  Plan of care communicated with patient     Coding guidelines for this visit:  Type of Medical   Decision Making Face-to-Face Visit       within 7 Days of discharge Face-to-Face Visit        within 14 days of discharge   Moderate Complexity [id number]   High Complexity [id number]       CT HEAD W/O CONTRAST 3/14/2019 5:25 PM  Impression: No acute intracranial pathology.    XR CHEST PORT 1 VW  3/14/2019 9:51 AM   IMPRESSION:  Bilateral interstitial opacities consistent with edema.    Ultrasound ascites check 3/13/2019 2:37 PM  Impression: Mild ascites noted in all four quadrants.  Not  substantially changed since 3/10/19.  "    US ABDOMEN COMPLETE WITH TIPSS DOPPLER, 3/10/2019 8:48 AM  Impression:   1. Significant increase in TIPS stent velocities concerning for  stenosis. Flow within the stent demonstrates significant aliasing,  which was seen on the prior exam. The highest velocity measures 279  cm/sec in the mid stent, previously 159 cm/sec.  2. Cirrhotic morphology of the liver with evidence of portal  hypertension, including splenomegaly and mild ascites.  3. Limited examination with nonvisualization of the gallbladder and  pancreas, likely due to bowel gas.  4. Bilateral renal stones.           Alcohol Abuse  Notes signed up program at [facility]. Denies any alcohol use since discharge. 18 month soberity.    Problem list and histories reviewed & adjusted, as indicated.  Additional history: as documented    Labs reviewed in EPIC    Reviewed and updated as needed this visit by clinical staff       Reviewed and updated as needed this visit by Provider         ROS:  CONSTITUTIONAL: NEGATIVE for fever, chills, change in weight  INTEGUMENTARY/SKIN: POSITIVE for jaundice  ENT/MOUTH: NEGATIVE for ear, mouth and throat problems  RESP: NEGATIVE for significant cough or SOB  CV: NEGATIVE for chest pain, palpitations or peripheral edema  GI: POSITIVE for abdominal pain epigastric, diarrhea, gas or bloating, jaundice and nausea (postprandial)  PSYCHIATRIC: POSITIVE for fatigue    This document serves as a record of the services and decisions personally performed and made by Rosette Wills MD. It was created on his behalf by Jonah Wang, a trained medical scribe. The creation of this document is based the provider's statements to the medical scribe.  Jonah Wang 3:26 PM April 1, 2019    OBJECTIVE:                                                    /60   Pulse 80   Temp 97.6  F (36.4  C) (Tympanic)   Resp 16   Ht 1.778 m (5' 10\")   Wt 76.7 kg (169 lb)   BMI 24.25 kg/m    Body mass index is 24.25 kg/m .   GENERAL: alert, pleasant, " marked jaundice in color.  HENT: ear canals- normal; TMs- normal; Nose- normal; Mouth- no ulcers, no lesions  NECK: no tenderness, no adenopathy, no asymmetry, no masses, no stiffness; thyroid- normal to palpation  RESP: lungs clear to auscultation - no rales, no rhonchi, no wheezes  CV: regular rates and rhythm, normal S1 S2  ABDOMEN: soft, no tenderness  SKIN: Significant icterus noted.  PSYCH: mentation appears normal, affect normal/bright    Results for orders placed or performed in visit on 04/01/19   Comprehensive metabolic panel (BMP + Alb, Alk Phos, ALT, AST, Total. Bili, TP)   Result Value Ref Range    Sodium 139 133 - 144 mmol/L    Potassium 3.9 3.4 - 5.3 mmol/L    Chloride 108 94 - 109 mmol/L    Carbon Dioxide 23 20 - 32 mmol/L    Anion Gap 8 3 - 14 mmol/L    Glucose 124 (H) 70 - 99 mg/dL    Urea Nitrogen 9 7 - 30 mg/dL    Creatinine 1.14 0.66 - 1.25 mg/dL    GFR Estimate 72 >60 mL/min/[1.73_m2]    GFR Estimate If Black 83 >60 mL/min/[1.73_m2]    Calcium 8.4 (L) 8.5 - 10.1 mg/dL    Bilirubin Total 32.6 (HH) 0.2 - 1.3 mg/dL    Albumin 2.4 (L) 3.4 - 5.0 g/dL    Protein Total 5.5 (L) 6.8 - 8.8 g/dL    Alkaline Phosphatase 328 (H) 40 - 150 U/L    ALT 30 0 - 70 U/L    AST 66 (H) 0 - 45 U/L   Vitamin D Deficiency   Result Value Ref Range    Vitamin D Deficiency screening 7 (L) 20 - 75 ug/L   INR   Result Value Ref Range    INR 1.80 (H) 0.86 - 1.14   CBC with platelets   Result Value Ref Range    WBC 5.2 4.0 - 11.0 10e9/L    RBC Count 3.08 (L) 4.4 - 5.9 10e12/L    Hemoglobin 10.6 (L) 13.3 - 17.7 g/dL    Hematocrit 31.9 (L) 40.0 - 53.0 %     (H) 78 - 100 fl    MCH 34.4 (H) 26.5 - 33.0 pg    MCHC 33.2 31.5 - 36.5 g/dL    RDW 17.0 (H) 10.0 - 15.0 %    Platelet Count 72 (L) 150 - 450 10e9/L         ASSESSMENT/PLAN:                                                    (K70.31) Alcoholic cirrhosis of liver with ascites (H)  (primary encounter diagnosis)  Comment: Reviewed with patient the importance of abstaining  from alcohol.  Discussed this is a lifelong abstinence.  Discussed the importance of an aftercare program and close follow-up.  Plan: Comprehensive metabolic panel (BMP + Alb, Alk         Phos, ALT, AST, Total. Bili, TP), INR, CBC with        platelets           (E55.9) Vitamin D deficiency  Comment: Significant deficiency.  Plan: Vitamin D Deficiency        Through the result note, advised 5000 units vitamin D3 daily.  Recheck vitamin D level in 3 months.    (I85.10) Secondary esophageal varices without bleeding (H)  Comment: No apparent bleeding at this time.  Plan: Monitor.    (K76.9) Hepatic dysfunction  Comment: Significant liver dysfunction with an elevated INR and bilirubin.  Liver function tests are not significantly elevated at this time.  Reviewed with the patient, this could represent 2 scenarios.  First, he has intact liver function that will improve if he still abstains from alcohol and the bilirubin and INR will improve with time.  Second, he could be looking at end-stage liver function.  In which case, there is limited treatment options.  Plan: We will monitor.  Serial blood tests to monitor bilirubin, INR, and liver function test.    (R17) Jaundice  Comment: Significant, patient denies itching.      (D63.8) Anemia of chronic disease  Comment: Moderate.  Plan: Reviewed treatment is addressing the underlying conditions.              Patient Instructions   *   Will check blood tests today, including potassium and vitamin D.     *   Stay on your current medications.     *   I think a group is very important to stay sober. I don't think you can stay away from alcohol on your own.     *   Learn from past mistakes.     *   Try small meals, eat what you can.     *    See me again in 2 weeks.     Follow up with Provider - Patient will follow up in 2 weeks or sooner, PRN.  Patient instructed to call with any questions or concerns.     See Patient Instructions    The information in this document, created by van  scribe for me, accurately reflects the services I personally performed and the decisions made by me. I have reviewed and approved this document for accuracy.     Rosette Wills MD  [facility]

## 2019-04-02 LAB — DEPRECATED CALCIDIOL+CALCIFEROL SERPL-MC: 7 UG/L (ref 20–75)

## 2019-04-10 ENCOUNTER — TELEPHONE (OUTPATIENT)
Dept: GASTROENTEROLOGY | Facility: CLINIC | Age: 54
End: 2019-04-10

## 2019-04-10 DIAGNOSIS — K74.69 DECOMPENSATED LIVER DISEASE (H): ICD-10-CM

## 2019-04-10 DIAGNOSIS — K70.31 ALCOHOLIC CIRRHOSIS OF LIVER WITH ASCITES (H): Primary | ICD-10-CM

## 2019-04-10 NOTE — TELEPHONE ENCOUNTER
Patient contacted and reminded of upcoming appointment.  Patient instructed to bring updated medications list to appointment.  Ivonne Ogden MA

## 2019-04-12 ENCOUNTER — OFFICE VISIT (OUTPATIENT)
Dept: GASTROENTEROLOGY | Facility: CLINIC | Age: 54
End: 2019-04-12
Attending: PHYSICIAN ASSISTANT
Payer: COMMERCIAL

## 2019-04-12 VITALS
HEART RATE: 97 BPM | WEIGHT: 172.4 LBS | HEIGHT: 70 IN | TEMPERATURE: 98 F | BODY MASS INDEX: 24.68 KG/M2 | SYSTOLIC BLOOD PRESSURE: 112 MMHG | DIASTOLIC BLOOD PRESSURE: 64 MMHG

## 2019-04-12 DIAGNOSIS — K74.69 DECOMPENSATED LIVER DISEASE (H): ICD-10-CM

## 2019-04-12 DIAGNOSIS — K70.31 ALCOHOLIC CIRRHOSIS OF LIVER WITH ASCITES (H): ICD-10-CM

## 2019-04-12 DIAGNOSIS — Z95.828 S/P TIPS (TRANSJUGULAR INTRAHEPATIC PORTOSYSTEMIC SHUNT): Primary | ICD-10-CM

## 2019-04-12 LAB
ALBUMIN SERPL-MCNC: 2 G/DL (ref 3.4–5)
ALP SERPL-CCNC: 299 U/L (ref 40–150)
ALT SERPL W P-5'-P-CCNC: 17 U/L (ref 0–70)
ANION GAP SERPL CALCULATED.3IONS-SCNC: 12 MMOL/L (ref 3–14)
AST SERPL W P-5'-P-CCNC: 57 U/L (ref 0–45)
BILIRUB DIRECT SERPL-MCNC: 21.1 MG/DL (ref 0–0.2)
BILIRUB SERPL-MCNC: 25.7 MG/DL (ref 0.2–1.3)
BUN SERPL-MCNC: 8 MG/DL (ref 7–30)
CALCIUM SERPL-MCNC: 8.6 MG/DL (ref 8.5–10.1)
CHLORIDE SERPL-SCNC: 109 MMOL/L (ref 94–109)
CO2 SERPL-SCNC: 19 MMOL/L (ref 20–32)
CREAT SERPL-MCNC: 1.08 MG/DL (ref 0.66–1.25)
ERYTHROCYTE [DISTWIDTH] IN BLOOD BY AUTOMATED COUNT: 16.4 % (ref 10–15)
GFR SERPL CREATININE-BSD FRML MDRD: 77 ML/MIN/{1.73_M2}
GLUCOSE SERPL-MCNC: 95 MG/DL (ref 70–99)
HCT VFR BLD AUTO: 30 % (ref 40–53)
HGB BLD-MCNC: 9.9 G/DL (ref 13.3–17.7)
INR PPP: 2.14 (ref 0.86–1.14)
MCH RBC QN AUTO: 33.2 PG (ref 26.5–33)
MCHC RBC AUTO-ENTMCNC: 33 G/DL (ref 31.5–36.5)
MCV RBC AUTO: 101 FL (ref 78–100)
PLATELET # BLD AUTO: 88 10E9/L (ref 150–450)
POTASSIUM SERPL-SCNC: 3.2 MMOL/L (ref 3.4–5.3)
PROT SERPL-MCNC: 5 G/DL (ref 6.8–8.8)
RBC # BLD AUTO: 2.98 10E12/L (ref 4.4–5.9)
SODIUM SERPL-SCNC: 139 MMOL/L (ref 133–144)
WBC # BLD AUTO: 5.6 10E9/L (ref 4–11)

## 2019-04-12 PROCEDURE — 85027 COMPLETE CBC AUTOMATED: CPT | Performed by: PHYSICIAN ASSISTANT

## 2019-04-12 PROCEDURE — G0463 HOSPITAL OUTPT CLINIC VISIT: HCPCS | Mod: ZF

## 2019-04-12 PROCEDURE — 85610 PROTHROMBIN TIME: CPT | Performed by: PHYSICIAN ASSISTANT

## 2019-04-12 PROCEDURE — 80048 BASIC METABOLIC PNL TOTAL CA: CPT | Performed by: PHYSICIAN ASSISTANT

## 2019-04-12 PROCEDURE — 80076 HEPATIC FUNCTION PANEL: CPT | Performed by: PHYSICIAN ASSISTANT

## 2019-04-12 PROCEDURE — 36415 COLL VENOUS BLD VENIPUNCTURE: CPT | Performed by: PHYSICIAN ASSISTANT

## 2019-04-12 ASSESSMENT — MIFFLIN-ST. JEOR: SCORE: 1623.25

## 2019-04-12 ASSESSMENT — PAIN SCALES - GENERAL: PAINLEVEL: NO PAIN (0)

## 2019-04-12 NOTE — NURSING NOTE
Met with patient and wife along with Celestino Verduzco PA-C for follow up after recent hospitalization. Plan for ultrasound to evaluate TIPS patency and ascites, scheduled for tomorrow AM. Will call on Monday to discuss any diuretic prescriptions Celestino may want to prescribe based on ultrasound results. Called and notified patient's wife to have patient increase his potassium over the weekend to 40 mEq twice per day. Will have labs drawn again at [facility] 1 week after starting diuretics. If patient were to need a paracentesis, would like this scheduled in [facility]. Patient and wife have this writer's direct contact information should they have any questions or concerns going forward. Elicia verbalized understanding and is agreeable to plan.

## 2019-04-12 NOTE — LETTER
4/12/2019      RE: Ivan Bell  [address] [address]       Hepatology Follow-up Clinic note  Ivan Bell   Date of Birth 1965  Date of Service 4/11/2019    Reason for follow-up: ETOH cirrhosis          Assessment/plan:   Ivan Bell is a 53 year old male with recent hospitalization ofr acute ETOH hepatitis on chronic cirrhosis complicated by history of ascites and variceal bleed s/p TIPS on 6/6/2018 and history of HE. He relapsed drinking after 18 months of sobriety, Last drank end of January 2019. His HE is well controlled. He does have moderate shifting dullness on exam and early satiety, so will assess for ascites with imaging and to re-evaluate TIPS patency.  His Bilirubin has improved from 32.5 to 25.7. MELD-Na-28. His hemoglobin is stable at 9.9. We discuss the importance of good nutrition as he recovers. We discussed a referral to behavior therapy to help aid in sobriety and provide further coping skills. He was agreeable.     # US abdomen limited today to assess for ascites, if so will start diuretics  - 2000 mg sodium/high protein diet  # Hypokalemia: Replace and repeat BMP in one week   - Increase to 80 mEq x 3 days.   # Hx of RTA Type 2: okay to discontinue Sodium Bicarb at this time given improvement of bilirubin and cholestasis   # Anemia: discussed monitoring for signs of lightheadedness or SOB  - Peripheal blood smear, LDH, haptoglobin   # History of alcohol abuse: Continue abstinence from Alcohol. Behavior therapy referral, this was discussed with patient and he is agreeable   # History of HE: continue lactulose (dose to 3-4 bowel movements a day)   # Follow-up in clinic in one month   # Follow up with Dr. Bales in 3 months      Celestino Verduzco PA-C   [facility]    Discussed with Dr. Valdez   -----------------------------------------------------       HPI:   Ivan Bell is a 53 year old male presenting for follow-up.     Cirrhosis  -  ETOH  - hx ascites, TIPS placement 5/14/18, 6/6/18  - hx HE  - hx variceal bleed Oct 2017  - last EGD Apr 2018- medium EV with banding x 4, mild portal hypertensive gastropathy  - HCC screening- liver doppler U/S  (P)    Patient was last seen in clinic by Dr. Bales on 9/10/2018. He is accompanied by his wife today.     Since last visit, patient was hospitalized for ETOH Hepatitis complicated by AMBER and confusion and malnutrition after relapsing with alcohol use.     Patient was sober for 18 month and then relapsed January 2019. Patient did not respond to prednisolone so it was discontinued after 7 days. No significant ascites on ultrasounds during hospitalization in March.     His weight has remained stable. He is losing muscle mass. He is not eating a lot. He states he will have an appetite and then he eats and foods don't taste good and he gets full easily. He is not sure if has fluid in his abdomen or ascites.  He is having at least three bowel movements a day. He denies any confusion. He has been taking rifaximin and lactulose twice daily.     He has been taking potassium supplements. He finished sodium bi-carb. He has been taking potassium supplements.     Patient denies lower extremity edema, abdominal distension or confusion.  Patient also denies melena, hematochezia or hematemesis. Patient denies  fevers, sweats or chills.    Patient last drank the end of January. States he drinks during boredom during the winter when he doesn't have work to do.     Medical hx Surgical hx   Past Medical History:   Diagnosis Date     Ascites      Cirrhosis (H)      Esophageal varices (H)      Hepatitis      Hypertension      Left calcaneus fracture 1/9/2006     Portal vein thrombosis     Past Surgical History:   Procedure Laterality Date     ANKLE SURGERY Left      COLONOSCOPY N/A 3/31/2016    Procedure: COLONOSCOPY;  Surgeon: Rhys Uriostegui MD;  Location:  GI     ESOPHAGOSCOPY, GASTROSCOPY, DUODENOSCOPY (EGD),  "COMBINED N/A 3/31/2016    Procedure: COMBINED ESOPHAGOSCOPY, GASTROSCOPY, DUODENOSCOPY (EGD);  Surgeon: Rhys Uriostegui MD;  Location:  GI     ESOPHAGOSCOPY, GASTROSCOPY, DUODENOSCOPY (EGD), COMBINED N/A 3/9/2018    Procedure: COMBINED ESOPHAGOSCOPY, GASTROSCOPY, DUODENOSCOPY (EGD), BIOPSY SINGLE OR MULTIPLE;  EGD;  Surgeon: Gonzalo Wahl MD;  Location:  GI     KNEE SURGERY Left      KNEE SURGERY Right      SIGMOIDOSCOPY FLEXIBLE N/A 10/31/2017    Procedure: SIGMOIDOSCOPY FLEXIBLE;;  Surgeon: Armaan Adams MD;  Location:  GI     TIPS Procedure  06/06/2018                 Medications:     Current Outpatient Medications   Medication     folic acid (FOLVITE) 1 MG tablet     lactulose (CEPHULAC) 20 GM Packet     MULTIPLE VITAMINS PO     pantoprazole (PROTONIX) 40 MG EC tablet     potassium chloride ER (K-DUR/KLOR-CON M) 20 MEQ CR tablet     rifaximin (XIFAXAN) 550 MG TABS tablet     vitamin B1 (THIAMINE) 100 MG tablet     No current facility-administered medications for this visit.             Allergies:     Allergies   Allergen Reactions     Benadryl [Diphenhydramine] Other (See Comments)     Delirium (visual and auditory hallucinations)     Oxycodone Other (See Comments)     Delirium and constipation            Review of Systems:   10 points ROS was obtained and highlighted in the HPI, otherwise negative.          Physical Exam:   VS:  /64   Pulse 97   Temp 98  F (36.7  C) (Oral)   Ht 1.778 m (5' 10\")   Wt 78.2 kg (172 lb 6.4 oz)   BMI 24.74 kg/m         Gen: A&Ox3, NAD, thin,peripheral muscle wasting, significantly jaundiced  HEENT: Icteric sclera,   CV: RRR, no overt murmurs  Lung: CTA Bilatererally, no wheezing or crackles.   Lym- no palpable lymphadenopathy  Abd: soft, NT, ND, moderate shifting dullness. No tense ascites.   Ext: no edema, intact pulses.   Skin: No rash no palmar erythema, telangiectasias.   Neuro: grossly intact, no asterixis   Psych: appropriate mood and " affects           Data:   Reviewed in person and significant for:    Lab Results   Component Value Date     04/01/2019      Lab Results   Component Value Date    POTASSIUM 3.9 04/01/2019     Lab Results   Component Value Date    CHLORIDE 108 04/01/2019     Lab Results   Component Value Date    CO2 23 04/01/2019     Lab Results   Component Value Date    BUN 9 04/01/2019     Lab Results   Component Value Date    CR 1.14 04/01/2019       Lab Results   Component Value Date    WBC 5.2 04/01/2019     Lab Results   Component Value Date    HGB 10.6 04/01/2019     Lab Results   Component Value Date    HCT 31.9 04/01/2019     Lab Results   Component Value Date     04/01/2019     Lab Results   Component Value Date    PLT 72 04/01/2019       Lab Results   Component Value Date    AST 66 04/01/2019     Lab Results   Component Value Date    ALT 30 04/01/2019     Lab Results   Component Value Date    BILICONJ 0.1 03/30/2011      Lab Results   Component Value Date    BILITOTAL 32.6 04/01/2019       Lab Results   Component Value Date    ALBUMIN 2.4 04/01/2019     Lab Results   Component Value Date    PROTTOTAL 5.5 04/01/2019      Lab Results   Component Value Date    ALKPHOS 328 04/01/2019       Lab Results   Component Value Date    INR 1.80 04/01/2019     3/13/2019:   US ABDOMEN   Impression: Mild ascites noted in all four quadrants.  Not  substantially changed since 3/10/19.      3/10/2019:   US ABDOMEN COMPLETE WITH DOPPLER:   1. Significant increase in TIPS stent velocities concerning for  stenosis. Flow within the stent demonstrates significant aliasing,  which was seen on the prior exam. The highest velocity measures 279  cm/sec in the mid stent, previously 159 cm/sec.  2. Cirrhotic morphology of the liver with evidence of portal  hypertension, including splenomegaly and mild ascites.  3. Limited examination with nonvisualization of the gallbladder and  pancreas, likely due to bowel gas.  4. Bilateral renal  stones.      Celestino Verduzco PA-C

## 2019-04-12 NOTE — NURSING NOTE
"/64   Pulse 97   Temp 98  F (36.7  C) (Oral)   Ht 1.778 m (5' 10\")   Wt 78.2 kg (172 lb 6.4 oz)   BMI 24.74 kg/m    Chief Complaint   Patient presents with     RECHECK     follow up with hospital visit, tyler heller       "

## 2019-04-12 NOTE — PROGRESS NOTES
Hepatology Follow-up Clinic note  Ivan Bell   Date of Birth 1965  Date of Service 4/11/2019    Reason for follow-up: ETOH cirrhosis          Assessment/plan:   Ivan Bell is a 53 year old male with recent hospitalization ofr acute ETOH hepatitis on chronic cirrhosis complicated by history of ascites and variceal bleed s/p TIPS on 6/6/2018 and history of HE. He relapsed drinking after 18 months of sobriety, Last drank end of January 2019. His HE is well controlled. He does have moderate shifting dullness on exam and early satiety, so will assess for ascites with imaging and to re-evaluate TIPS patency.  His Bilirubin has improved from 32.5 to 25.7. MELD-Na-28. His hemoglobin is stable at 9.9. We discuss the importance of good nutrition as he recovers. We discussed a referral to behavior therapy to help aid in sobriety and provide further coping skills. He was agreeable.     # US abdomen limited today to assess for ascites, if so will start diuretics  - 2000 mg sodium/high protein diet  # Hypokalemia: Replace and repeat BMP in one week   - Increase to 80 mEq x 3 days.   # Hx of RTA Type 2: okay to discontinue Sodium Bicarb at this time given improvement of bilirubin and cholestasis   # Anemia: discussed monitoring for signs of lightheadedness or SOB  - Peripheal blood smear, LDH, haptoglobin   # History of alcohol abuse: Continue abstinence from Alcohol. Behavior therapy referral, this was discussed with patient and he is agreeable   # History of HE: continue lactulose (dose to 3-4 bowel movements a day)   # Follow-up in clinic in one month   # Follow up with Dr. Bales in 3 months      Celestino Verduzco PA-C   [facility] Hepatology clinic    Discussed with Dr. Valdez   -----------------------------------------------------       HPI:   Ivan Bell is a 53 year old male presenting for follow-up.     Cirrhosis  - ETOH  - hx ascites, TIPS placement 5/14/18, 6/6/18  - hx HE  - hx variceal bleed Oct  2017  - last EGD Apr 2018- medium EV with banding x 4, mild portal hypertensive gastropathy  - HCC screening- liver doppler U/S  (P)    Patient was last seen in clinic by Dr. Bales on 9/10/2018. He is accompanied by his wife today.     Since last visit, patient was hospitalized for ETOH Hepatitis complicated by AMBER and confusion and malnutrition after relapsing with alcohol use.     Patient was sober for 18 month and then relapsed January 2019. Patient did not respond to prednisolone so it was discontinued after 7 days. No significant ascites on ultrasounds during hospitalization in March.     His weight has remained stable. He is losing muscle mass. He is not eating a lot. He states he will have an appetite and then he eats and foods don't taste good and he gets full easily. He is not sure if has fluid in his abdomen or ascites.  He is having at least three bowel movements a day. He denies any confusion. He has been taking rifaximin and lactulose twice daily.     He has been taking potassium supplements. He finished sodium bi-carb. He has been taking potassium supplements.     Patient denies lower extremity edema, abdominal distension or confusion.  Patient also denies melena, hematochezia or hematemesis. Patient denies  fevers, sweats or chills.    Patient last drank the end of January. States he drinks during boredom during the winter when he doesn't have work to do.     Medical hx Surgical hx   Past Medical History:   Diagnosis Date     Ascites      Cirrhosis (H)      Esophageal varices (H)      Hepatitis      Hypertension      Left calcaneus fracture 1/9/2006     Portal vein thrombosis     Past Surgical History:   Procedure Laterality Date     ANKLE SURGERY Left      COLONOSCOPY N/A 3/31/2016    Procedure: COLONOSCOPY;  Surgeon: Rhys Uriostegui MD;  Location:  GI     ESOPHAGOSCOPY, GASTROSCOPY, DUODENOSCOPY (EGD), COMBINED N/A 3/31/2016    Procedure: COMBINED ESOPHAGOSCOPY, GASTROSCOPY,  "DUODENOSCOPY (EGD);  Surgeon: Rhys Uriostegui MD;  Location:  GI     ESOPHAGOSCOPY, GASTROSCOPY, DUODENOSCOPY (EGD), COMBINED N/A 3/9/2018    Procedure: COMBINED ESOPHAGOSCOPY, GASTROSCOPY, DUODENOSCOPY (EGD), BIOPSY SINGLE OR MULTIPLE;  EGD;  Surgeon: Gonzalo Wahl MD;  Location:  GI     KNEE SURGERY Left      KNEE SURGERY Right      SIGMOIDOSCOPY FLEXIBLE N/A 10/31/2017    Procedure: SIGMOIDOSCOPY FLEXIBLE;;  Surgeon: Armaan Adams MD;  Location:  GI     TIPS Procedure  06/06/2018                 Medications:     Current Outpatient Medications   Medication     folic acid (FOLVITE) 1 MG tablet     lactulose (CEPHULAC) 20 GM Packet     MULTIPLE VITAMINS PO     pantoprazole (PROTONIX) 40 MG EC tablet     potassium chloride ER (K-DUR/KLOR-CON M) 20 MEQ CR tablet     rifaximin (XIFAXAN) 550 MG TABS tablet     vitamin B1 (THIAMINE) 100 MG tablet     No current facility-administered medications for this visit.             Allergies:     Allergies   Allergen Reactions     Benadryl [Diphenhydramine] Other (See Comments)     Delirium (visual and auditory hallucinations)     Oxycodone Other (See Comments)     Delirium and constipation            Review of Systems:   10 points ROS was obtained and highlighted in the HPI, otherwise negative.          Physical Exam:   VS:  /64   Pulse 97   Temp 98  F (36.7  C) (Oral)   Ht 1.778 m (5' 10\")   Wt 78.2 kg (172 lb 6.4 oz)   BMI 24.74 kg/m        Gen: A&Ox3, NAD, thin,peripheral muscle wasting, significantly jaundiced  HEENT: Icteric sclera,   CV: RRR, no overt murmurs  Lung: CTA Bilatererally, no wheezing or crackles.   Lym- no palpable lymphadenopathy  Abd: soft, NT, ND, moderate shifting dullness. No tense ascites.   Ext: no edema, intact pulses.   Skin: No rash no palmar erythema, telangiectasias.   Neuro: grossly intact, no asterixis   Psych: appropriate mood and affects           Data:   Reviewed in person and significant for:    Lab " Results   Component Value Date     04/01/2019      Lab Results   Component Value Date    POTASSIUM 3.9 04/01/2019     Lab Results   Component Value Date    CHLORIDE 108 04/01/2019     Lab Results   Component Value Date    CO2 23 04/01/2019     Lab Results   Component Value Date    BUN 9 04/01/2019     Lab Results   Component Value Date    CR 1.14 04/01/2019       Lab Results   Component Value Date    WBC 5.2 04/01/2019     Lab Results   Component Value Date    HGB 10.6 04/01/2019     Lab Results   Component Value Date    HCT 31.9 04/01/2019     Lab Results   Component Value Date     04/01/2019     Lab Results   Component Value Date    PLT 72 04/01/2019       Lab Results   Component Value Date    AST 66 04/01/2019     Lab Results   Component Value Date    ALT 30 04/01/2019     Lab Results   Component Value Date    BILICONJ 0.1 03/30/2011      Lab Results   Component Value Date    BILITOTAL 32.6 04/01/2019       Lab Results   Component Value Date    ALBUMIN 2.4 04/01/2019     Lab Results   Component Value Date    PROTTOTAL 5.5 04/01/2019      Lab Results   Component Value Date    ALKPHOS 328 04/01/2019       Lab Results   Component Value Date    INR 1.80 04/01/2019     3/13/2019:   US ABDOMEN   Impression: Mild ascites noted in all four quadrants.  Not  substantially changed since 3/10/19.      3/10/2019:   US ABDOMEN COMPLETE WITH DOPPLER:   1. Significant increase in TIPS stent velocities concerning for  stenosis. Flow within the stent demonstrates significant aliasing,  which was seen on the prior exam. The highest velocity measures 279  cm/sec in the mid stent, previously 159 cm/sec.  2. Cirrhotic morphology of the liver with evidence of portal  hypertension, including splenomegaly and mild ascites.  3. Limited examination with nonvisualization of the gallbladder and  pancreas, likely due to bowel gas.  4. Bilateral renal stones.

## 2019-04-13 ENCOUNTER — ANCILLARY PROCEDURE (OUTPATIENT)
Dept: ULTRASOUND IMAGING | Facility: CLINIC | Age: 54
End: 2019-04-13
Attending: PHYSICIAN ASSISTANT
Payer: COMMERCIAL

## 2019-04-13 DIAGNOSIS — K70.31 ALCOHOLIC CIRRHOSIS OF LIVER WITH ASCITES (H): ICD-10-CM

## 2019-04-13 DIAGNOSIS — Z95.828 S/P TIPS (TRANSJUGULAR INTRAHEPATIC PORTOSYSTEMIC SHUNT): ICD-10-CM

## 2019-04-15 ENCOUNTER — PATIENT OUTREACH (OUTPATIENT)
Dept: GASTROENTEROLOGY | Facility: CLINIC | Age: 54
End: 2019-04-15

## 2019-04-15 RX ORDER — FUROSEMIDE 20 MG
20 TABLET ORAL DAILY
Qty: 30 TABLET | Refills: 3 | Status: SHIPPED | OUTPATIENT
Start: 2019-04-15 | End: 2019-07-22 | Stop reason: SINTOL

## 2019-04-15 RX ORDER — SPIRONOLACTONE 50 MG/1
100 TABLET, FILM COATED ORAL DAILY
Qty: 60 TABLET | Refills: 1 | Status: ON HOLD | OUTPATIENT
Start: 2019-04-15 | End: 2019-06-07

## 2019-04-15 RX ORDER — ALBUMIN (HUMAN) 12.5 G/50ML
12.5 SOLUTION INTRAVENOUS 4 TIMES DAILY PRN
Status: CANCELLED
Start: 2019-04-26

## 2019-04-15 NOTE — PROGRESS NOTES
Called and spoke to patient's wife to review recommendations per Celestino Verduzco PA-C:  Please call patient, he did have a moderate to large amount of ascites on ultrasound.   - Go back down to 40 mEq of K-Dur (potassium chloride)   - Start taking 100 mg spironolactone   - Start taking 20 mg Lasix   - Repeat BMP in one week.   He may want to schedule a paracentesis for late next week, If he still has a poor appetite and feels full and bloated in a week despite starting diuretics.     She verbalized understanding and has no further questions at this time.   Addendum: Paracentesis is scheduled for Friday 4/26 with an 0730 check in time, in [facility].  Attempted to reach patient's wife, no answer and unable to leave message. Called the home phone and spoke to patient, relayed this appointment date/time, and suggested he have wife Elicia call tomorrow if she has further questions. Patient verbalized understanding.

## 2019-04-17 ENCOUNTER — OFFICE VISIT (OUTPATIENT)
Dept: INFUSION THERAPY | Facility: CLINIC | Age: 54
End: 2019-04-17
Attending: PHYSICIAN ASSISTANT
Payer: COMMERCIAL

## 2019-04-17 ENCOUNTER — MYC MEDICAL ADVICE (OUTPATIENT)
Dept: GASTROENTEROLOGY | Facility: CLINIC | Age: 54
End: 2019-04-17

## 2019-04-17 ENCOUNTER — HOSPITAL ENCOUNTER (EMERGENCY)
Facility: CLINIC | Age: 54
Discharge: HOME | End: 2019-04-17
Attending: FAMILY MEDICINE | Admitting: FAMILY MEDICINE
Payer: COMMERCIAL

## 2019-04-17 ENCOUNTER — ANCILLARY PROCEDURE (OUTPATIENT)
Dept: ULTRASOUND IMAGING | Facility: CLINIC | Age: 54
End: 2019-04-17
Attending: PHYSICIAN ASSISTANT
Payer: COMMERCIAL

## 2019-04-17 VITALS
SYSTOLIC BLOOD PRESSURE: 122 MMHG | TEMPERATURE: 97.7 F | WEIGHT: 156.2 LBS | BODY MASS INDEX: 22.41 KG/M2 | OXYGEN SATURATION: 99 % | DIASTOLIC BLOOD PRESSURE: 66 MMHG

## 2019-04-17 VITALS
OXYGEN SATURATION: 98 % | HEIGHT: 70 IN | BODY MASS INDEX: 23.34 KG/M2 | RESPIRATION RATE: 28 BRPM | SYSTOLIC BLOOD PRESSURE: 134 MMHG | WEIGHT: 163 LBS | DIASTOLIC BLOOD PRESSURE: 64 MMHG | TEMPERATURE: 97.9 F

## 2019-04-17 DIAGNOSIS — K70.31 ALCOHOLIC CIRRHOSIS OF LIVER WITH ASCITES (H): Primary | ICD-10-CM

## 2019-04-17 DIAGNOSIS — K70.31 ASCITES DUE TO ALCOHOLIC CIRRHOSIS (H): ICD-10-CM

## 2019-04-17 DIAGNOSIS — K72.90 DECOMPENSATION OF CIRRHOSIS OF LIVER (H): ICD-10-CM

## 2019-04-17 DIAGNOSIS — K74.60 DECOMPENSATION OF CIRRHOSIS OF LIVER (H): ICD-10-CM

## 2019-04-17 LAB
ALBUMIN SERPL-MCNC: 1.9 G/DL (ref 3.4–5)
ALP SERPL-CCNC: 301 U/L (ref 40–150)
ALT SERPL W P-5'-P-CCNC: 22 U/L (ref 0–70)
ANION GAP SERPL CALCULATED.3IONS-SCNC: 8 MMOL/L (ref 3–14)
AST SERPL W P-5'-P-CCNC: 67 U/L (ref 0–45)
BASOPHILS # BLD AUTO: 0 10E9/L (ref 0–0.2)
BASOPHILS NFR BLD AUTO: 0.5 %
BILIRUB SERPL-MCNC: 24.7 MG/DL (ref 0.2–1.3)
BUN SERPL-MCNC: 8 MG/DL (ref 7–30)
CALCIUM SERPL-MCNC: 8 MG/DL (ref 8.5–10.1)
CHLORIDE SERPL-SCNC: 110 MMOL/L (ref 94–109)
CO2 SERPL-SCNC: 19 MMOL/L (ref 20–32)
CREAT SERPL-MCNC: 1.22 MG/DL (ref 0.66–1.25)
DIFFERENTIAL METHOD BLD: ABNORMAL
EOSINOPHIL # BLD AUTO: 1.3 10E9/L (ref 0–0.7)
EOSINOPHIL NFR BLD AUTO: 16.7 %
ERYTHROCYTE [DISTWIDTH] IN BLOOD BY AUTOMATED COUNT: 16.2 % (ref 10–15)
GFR SERPL CREATININE-BSD FRML MDRD: 66 ML/MIN/{1.73_M2}
GLUCOSE SERPL-MCNC: 115 MG/DL (ref 70–99)
HCT VFR BLD AUTO: 28.8 % (ref 40–53)
HGB BLD-MCNC: 9.7 G/DL (ref 13.3–17.7)
IMM GRANULOCYTES # BLD: 0.1 10E9/L (ref 0–0.4)
IMM GRANULOCYTES NFR BLD: 0.9 %
INR PPP: 2.09 (ref 0.86–1.14)
LYMPHOCYTES # BLD AUTO: 0.9 10E9/L (ref 0.8–5.3)
LYMPHOCYTES NFR BLD AUTO: 11.2 %
MCH RBC QN AUTO: 33.2 PG (ref 26.5–33)
MCHC RBC AUTO-ENTMCNC: 33.7 G/DL (ref 31.5–36.5)
MCV RBC AUTO: 99 FL (ref 78–100)
MONOCYTES # BLD AUTO: 0.6 10E9/L (ref 0–1.3)
MONOCYTES NFR BLD AUTO: 8.1 %
NEUTROPHILS # BLD AUTO: 4.8 10E9/L (ref 1.6–8.3)
NEUTROPHILS NFR BLD AUTO: 62.6 %
NRBC # BLD AUTO: 0 10*3/UL
NRBC BLD AUTO-RTO: 0 /100
PLATELET # BLD AUTO: 77 10E9/L (ref 150–450)
POTASSIUM SERPL-SCNC: 3.5 MMOL/L (ref 3.4–5.3)
PROT SERPL-MCNC: 5.1 G/DL (ref 6.8–8.8)
RBC # BLD AUTO: 2.92 10E12/L (ref 4.4–5.9)
SODIUM SERPL-SCNC: 137 MMOL/L (ref 133–144)
WBC # BLD AUTO: 7.7 10E9/L (ref 4–11)

## 2019-04-17 PROCEDURE — 40000556 ZZH STATISTIC PERIPHERAL IV START W US GUIDANCE: Mod: ZF

## 2019-04-17 PROCEDURE — 85025 COMPLETE CBC W/AUTO DIFF WBC: CPT | Performed by: FAMILY MEDICINE

## 2019-04-17 PROCEDURE — 99284 EMERGENCY DEPT VISIT MOD MDM: CPT | Mod: Z6 | Performed by: FAMILY MEDICINE

## 2019-04-17 PROCEDURE — 25000125 ZZHC RX 250: Mod: ZF | Performed by: PHYSICIAN ASSISTANT

## 2019-04-17 PROCEDURE — 85610 PROTHROMBIN TIME: CPT | Performed by: FAMILY MEDICINE

## 2019-04-17 PROCEDURE — 27210190 US PARACENTESIS

## 2019-04-17 PROCEDURE — 25000128 H RX IP 250 OP 636: Mod: ZF | Performed by: PHYSICIAN ASSISTANT

## 2019-04-17 PROCEDURE — 99283 EMERGENCY DEPT VISIT LOW MDM: CPT | Performed by: FAMILY MEDICINE

## 2019-04-17 PROCEDURE — 80053 COMPREHEN METABOLIC PANEL: CPT | Performed by: FAMILY MEDICINE

## 2019-04-17 PROCEDURE — P9047 ALBUMIN (HUMAN), 25%, 50ML: HCPCS | Mod: ZF | Performed by: PHYSICIAN ASSISTANT

## 2019-04-17 RX ORDER — FOLIC ACID 1 MG/1
1 TABLET ORAL DAILY
Qty: 30 TABLET | Refills: 0 | Status: SHIPPED | OUTPATIENT
Start: 2019-04-17 | End: 2019-05-24

## 2019-04-17 RX ORDER — ALBUMIN (HUMAN) 12.5 G/50ML
12.5 SOLUTION INTRAVENOUS 4 TIMES DAILY PRN
Status: CANCELLED
Start: 2019-04-17

## 2019-04-17 RX ORDER — PANTOPRAZOLE SODIUM 40 MG/1
40 TABLET, DELAYED RELEASE ORAL
Qty: 30 TABLET | Refills: 0 | Status: SHIPPED | OUTPATIENT
Start: 2019-04-17 | End: 2019-05-24

## 2019-04-17 RX ORDER — ALBUMIN (HUMAN) 12.5 G/50ML
12.5 SOLUTION INTRAVENOUS 4 TIMES DAILY PRN
Status: DISCONTINUED | OUTPATIENT
Start: 2019-04-17 | End: 2019-04-17 | Stop reason: HOSPADM

## 2019-04-17 RX ADMIN — ALBUMIN HUMAN 12.5 G: 0.25 SOLUTION INTRAVENOUS at 13:10

## 2019-04-17 RX ADMIN — ALBUMIN HUMAN 12.5 G: 0.25 SOLUTION INTRAVENOUS at 12:57

## 2019-04-17 RX ADMIN — ALBUMIN HUMAN 12.5 G: 0.25 SOLUTION INTRAVENOUS at 13:02

## 2019-04-17 RX ADMIN — ALBUMIN HUMAN 12.5 G: 0.25 SOLUTION INTRAVENOUS at 12:56

## 2019-04-17 RX ADMIN — LIDOCAINE HYDROCHLORIDE 15 ML: 10 INJECTION, SOLUTION EPIDURAL; INFILTRATION; INTRACAUDAL; PERINEURAL at 12:56

## 2019-04-17 ASSESSMENT — MIFFLIN-ST. JEOR: SCORE: 1580.61

## 2019-04-17 NOTE — ED NOTES
Pt states that his stomach has gotten too large and he cannot breathe as well as the increase in pain/ jaundiced throughout

## 2019-04-17 NOTE — TELEPHONE ENCOUNTER
Returned call and spoke to patient's wife Elicia. The family will arrange for transportation for him, she is just in need of assistance in obtaining an appointment. Arranged for paracentesis today at 12:00 in [facility].

## 2019-04-17 NOTE — PROGRESS NOTES
Paracentesis Nursing Note  Ivan Bell presents today to [facility] for a paracentesis.    During today's appointment orders from Celestino Verduzco PA-C were completed.    Progress Note:  Patient identification verified by name and date of birth.  Assessment completed.  Vitals monitored throughout appointment and recorded in Doc Flowsheets.  See proceduralist note in ultrasound.    Vascular Access: peripheral IV was placed at vascular access.  Labs: were not ordered for this appointment.    Date of consent or authorization: 4/17/19.  Invasive Procedure Safety Checklist was completed and sent for scanning.     Paracentesis performed by Courtney Sethi PA-C Radiology.    The following labs were communicated to provider performing paracentesis:  Lab Results   Component Value Date    PLT 77 04/17/2019       Total amount of ascites fluid drained: 4.5 liters.  Color of ascites fluid: yellow.  Total amount of albumin given: 50  grams.    Patient tolerated procedure well.    Post procedure,denies pain or discomfort post paracentesis.      Discharge Plan:  Discharge instructions were reviewed with patient.  Patient/Representative verbalized understanding and all questions were answered.   Discharged from [facility] in stable condition.    Maya Monroe RN    Administrations This Visit     albumin human 25 % injection 12.5 g     Admin Date  04/17/2019 Action  New Bag Dose  12.5 g Route  Intravenous Administered By  Maya Monroe RN           Admin Date  04/17/2019 Action  New Bag Dose  12.5 g Route  Intravenous Administered By  Maya Monroe RN           Admin Date  04/17/2019 Action  New Bag Dose  12.5 g Route  Intravenous Administered By  Maya Monroe RN           Admin Date  04/17/2019 Action  New Bag Dose  12.5 g Route  Intravenous Administered By  Maya Monroe RN          lidocaine 1 % 20 mL     Admin Date  04/17/2019 Action  Given by Other  Dose  15 mL Route  Other Administered By  Maya Monroe, DILIA                /69   Temp 97.7  F (36.5  C)   Wt 75.5 kg (166 lb 8 oz)   SpO2 99%   BMI 23.89 kg/m

## 2019-04-17 NOTE — DISCHARGE INSTRUCTIONS
Please call the diagnostic imaging department here in the morning after 8 AM and they should be able to advise you if paracentesis can be performed at this facility today or tomorrow by 1 of our radiologist.  I would recommend a follow-up phone call to your gastroenterologist to see if they can facilitate an earlier paracentesis at [facility] or at the [place] if you are unsuccessful getting paracentesis here.

## 2019-04-17 NOTE — ED PROVIDER NOTES
History     Chief Complaint   Patient presents with     Abdominal Pain     Cirrhosis/ schrduled for paracentesis 2 weeks     HPI  Ivan Bell is a 53 year old male, past medical history is significant for alcoholic cirrhosis with ascites, nephrolithiasis, cholecystitis, hypertension, erectile dysfunction, gout, hypertriglyceridemia, esophageal varices, presents to the emergency department with concerns of expanding abdomen and request for paracentesis.  History is obtained from the patient and his wife, primarily his wife.  She states that he has been recently seen by gastroenterology and had a conversation with them on 4/15/19 with respect to his expanding abdominal girth and reaccumulation of ascites, was scheduled at [facility] on 4/26/19 at 7:30 AM for a paracentesis.  He presents now because he does not think he can wait that long.  He feels like his abdomen has expanded to the point where it is interfering with his breathing.  He feels like he is short of breath all the time.  He does not feel like he is more jaundiced than usual.  When questioned about pain in his abdomen he feels like it is more pressure/discomfort and primarily centrally.  He has been compliant with diuretics as discussed with him at the previous GI contact.  He notes no fever chills or sweats.    Allergies:  Allergies   Allergen Reactions     Benadryl [Diphenhydramine] Other (See Comments)     Delirium (visual and auditory hallucinations)     Oxycodone Other (See Comments)     Delirium and constipation       Problem List:    Patient Active Problem List    Diagnosis Date Noted     Decompensation of cirrhosis of liver (H) 03/10/2019     Priority: Medium     Calculus of gallbladder without cholecystitis 09/23/2018     Priority: Medium     September 23, 2018 non-obstructing, incidental finding on us.        Nephrolithiasis 09/23/2018     Priority: Medium     September 23, 2018 non-obstructing, incident finding on us.         Alcoholic cirrhosis of liver with ascites (H) 03/08/2016     Priority: Medium     September 23, 2018 now sober, ascites resolved, S/P TIP procedure 6/2018. Normal liver enzymes, diminished liver function.  INR 1.6, bilirubin 2.5, albumin 2.6. He  appears healthy. Doing well. Stressed the importance of abstaining from alcohol. See copy of recent us.     9/10/18:  Impression:   1. Patent TIPS with appropriate in stent velocities. Normal Doppler  evaluation of the remainder of the hepatic vasculature in the setting  of TIPS.  2. Cirrhotic hepatic morphology with evidence of portal hypertension,  including splenomegaly. No focal hepatic mass.  3. Cholelithiasis without evidence of cholecystitis.  4. Bilateral nonobstructing nephrolithiasis.            Hypertension goal BP (blood pressure) < 140/90 12/18/2012     Priority: Medium     24 hour contact given to patient  01/02/2012     Priority: Medium     EMERGENCY CARE PLAN  Presenting Problem Signs and Symptoms Treatment Plan    Questions or conerns during clinic hours    I will call the clinic directly     Questions or conerns outside clinic hours    I will call the 24 hour nurse line at [phone]    Patient needs to schedule an appointment    I will call the 24 hour scheduling team at [phone] or clinic directly    Same day treatment     I will call the clinic first, nurse line if after hours, urgent care and express care if needed                                 CARDIOVASCULAR SCREENING; LDL GOAL LESS THAN 130 10/31/2010     Priority: Medium     Erectile dysfunction 01/29/2008     Priority: Medium     September 23, 2018 no known CAD, no contraindications to sildenafil.        Gouty arthropathy 12/31/2006     Priority: Medium     Problem list name updated by automated process. Provider to review and confirm  Imo Update utility       Hypertriglyceridemia 08/03/2005     Priority: Medium     Last Exam: December 19, 2007  Last Lipids: CHOL      211   01/25/2007 LDL       104   2007 HDL       83   2007  Last LFTs:: AST      106   2007   ALT       53   2007    TRIG      120   2007  TRIG      115   2006  Meds: Lopid 600 bid - tolerating          Past Medical History:    Past Medical History:   Diagnosis Date     Ascites      Cirrhosis (H)      Esophageal varices (H)      Hepatitis      Hypertension      Left calcaneus fracture 2006     Portal vein thrombosis        Past Surgical History:    Past Surgical History:   Procedure Laterality Date     ANKLE SURGERY Left      COLONOSCOPY N/A 3/31/2016    Procedure: COLONOSCOPY;  Surgeon: Rhys Uriostegui MD;  Location:  GI     ESOPHAGOSCOPY, GASTROSCOPY, DUODENOSCOPY (EGD), COMBINED N/A 3/31/2016    Procedure: COMBINED ESOPHAGOSCOPY, GASTROSCOPY, DUODENOSCOPY (EGD);  Surgeon: Rhys Uriostegui MD;  Location:  GI     ESOPHAGOSCOPY, GASTROSCOPY, DUODENOSCOPY (EGD), COMBINED N/A 3/9/2018    Procedure: COMBINED ESOPHAGOSCOPY, GASTROSCOPY, DUODENOSCOPY (EGD), BIOPSY SINGLE OR MULTIPLE;  EGD;  Surgeon: Gonzalo Wahl MD;  Location:  GI     KNEE SURGERY Left      KNEE SURGERY Right      SIGMOIDOSCOPY FLEXIBLE N/A 10/31/2017    Procedure: SIGMOIDOSCOPY FLEXIBLE;;  Surgeon: Armaan Adams MD;  Location:  GI     TIPS Procedure  2018       Family History:    Family History   Problem Relation Age of Onset     Family History Negative Mother      Family History Negative Father      Hypertension Father      Cerebrovascular Disease Father 87     Breast Cancer Maternal Grandmother      Rheumatoid Arthritis Daughter      Depression Daughter      Cancer - colorectal No family hx of      Prostate Cancer No family hx of      Liver Disease No family hx of        Social History:  Marital Status:   [2]  Social History     Tobacco Use     Smoking status: Former Smoker     Types: Dip, chew, snus or snuff     Last attempt to quit: 1998     Years since quittin.6  "    Smokeless tobacco: Current User     Last attempt to quit: 10/24/2017     Tobacco comment: 1 tin per 10 days.   Substance Use Topics     Alcohol use: No     Alcohol/week: 10.5 oz     Types: 21 Cans of beer per week     Comment: last etoh 2/14/16, did have Odouls ~8/2017     Drug use: No        Medications:      folic acid (FOLVITE) 1 MG tablet   furosemide (LASIX) 20 MG tablet   lactulose (CEPHULAC) 20 GM Packet   MULTIPLE VITAMINS PO   pantoprazole (PROTONIX) 40 MG EC tablet   potassium chloride ER (K-DUR/KLOR-CON M) 20 MEQ CR tablet   rifaximin (XIFAXAN) 550 MG TABS tablet   spironolactone (ALDACTONE) 50 MG tablet   vitamin B1 (THIAMINE) 100 MG tablet         Review of Systems   All other systems reviewed and are negative.      Physical Exam   BP: 134/64  Heart Rate: 87  Temp: 97.9  F (36.6  C)  Resp: 28  Height: 177.8 cm (5' 10\")  Weight: 73.9 kg (163 lb)  SpO2: 98 %      Physical Exam   Constitutional: He is oriented to person, place, and time.   Chronically ill, cachectic, profoundly jaundiced, no acute distress.  Heart rate is 67, respiratory rate is 18 at the time of exam.   HENT:   Head: Normocephalic and atraumatic.   Right Ear: External ear normal.   Left Ear: External ear normal.   Nose: Nose normal.   Mouth/Throat: Oropharynx is clear and moist.   Eyes: Pupils are equal, round, and reactive to light. EOM are normal. Scleral icterus is present.   Neck: Normal range of motion. Neck supple.   Cardiovascular: Normal rate, regular rhythm and normal heart sounds.   Pulmonary/Chest: Effort normal and breath sounds normal.   Abdominal: Soft.   Distended abdomen with caput present, shifting dullness, transmitted fluid wave.  Nontender no rebound no guarding.  Normal bowel sounds.   Musculoskeletal: Normal range of motion.   Neurological: He is alert and oriented to person, place, and time.   Skin: Skin is warm. Capillary refill takes less than 2 seconds.   Psychiatric: He has a normal mood and affect. His " behavior is normal.   Nursing note and vitals reviewed.      ED Course     ED Course as of Apr 17 0235 Wed Apr 17, 2019   0129 CBC with platelets, differential(!)   0129 Hemoglobin(!): 9.7   0129 Hemoglobin(!): 9.7   0130 CBC with platelets, differential(!)   0130 CBC with platelets, differential(!)   0130 WBC: 7.7   0144 Bilirubin Total(!!): 24.7     Procedures               Critical Care time:  none               Results for orders placed or performed during the hospital encounter of 04/17/19 (from the past 24 hour(s))   CBC with platelets, differential   Result Value Ref Range    WBC 7.7 4.0 - 11.0 10e9/L    RBC Count 2.92 (L) 4.4 - 5.9 10e12/L    Hemoglobin 9.7 (L) 13.3 - 17.7 g/dL    Hematocrit 28.8 (L) 40.0 - 53.0 %    MCV 99 78 - 100 fl    MCH 33.2 (H) 26.5 - 33.0 pg    MCHC 33.7 31.5 - 36.5 g/dL    RDW 16.2 (H) 10.0 - 15.0 %    Platelet Count 77 (L) 150 - 450 10e9/L    Diff Method Automated Method     % Neutrophils 62.6 %    % Lymphocytes 11.2 %    % Monocytes 8.1 %    % Eosinophils 16.7 %    % Basophils 0.5 %    % Immature Granulocytes 0.9 %    Nucleated RBCs 0 0 /100    Absolute Neutrophil 4.8 1.6 - 8.3 10e9/L    Absolute Lymphocytes 0.9 0.8 - 5.3 10e9/L    Absolute Monocytes 0.6 0.0 - 1.3 10e9/L    Absolute Eosinophils 1.3 (H) 0.0 - 0.7 10e9/L    Absolute Basophils 0.0 0.0 - 0.2 10e9/L    Abs Immature Granulocytes 0.1 0 - 0.4 10e9/L    Absolute Nucleated RBC 0.0    INR   Result Value Ref Range    INR 2.09 (H) 0.86 - 1.14   Comprehensive metabolic panel   Result Value Ref Range    Sodium 137 133 - 144 mmol/L    Potassium 3.5 3.4 - 5.3 mmol/L    Chloride 110 (H) 94 - 109 mmol/L    Carbon Dioxide 19 (L) 20 - 32 mmol/L    Anion Gap 8 3 - 14 mmol/L    Glucose 115 (H) 70 - 99 mg/dL    Urea Nitrogen 8 7 - 30 mg/dL    Creatinine 1.22 0.66 - 1.25 mg/dL    GFR Estimate 66 >60 mL/min/[1.73_m2]    GFR Estimate If Black 77 >60 mL/min/[1.73_m2]    Calcium 8.0 (L) 8.5 - 10.1 mg/dL    Bilirubin Total 24.7 (HH) 0.2 -  1.3 mg/dL    Albumin 1.9 (L) 3.4 - 5.0 g/dL    Protein Total 5.1 (L) 6.8 - 8.8 g/dL    Alkaline Phosphatase 301 (H) 40 - 150 U/L    ALT 22 0 - 70 U/L    AST 67 (H) 0 - 45 U/L   2:36 AM  At the time of presentation paracentesis is not available and the patient is not emergently in need of it at this time.  I certainly empathized with his situation and to this and I contacted the on-call radiologist to find out if interventional radiology, to the limited extent that we have at this facility, will be available tomorrow to be able to perform paracentesis for the patient.  The on-call radiologist, Dr. Dixon, was not certain that the radiologist listed for this facility either today or tomorrow would be able to perform this procedure.  He suggested that I placed the order for the paracentesis for the patient, have them call scheduling after 8 AM and they would be able to inform the patient at that point.  I also spoke with the rad tech about this and made them aware of the patient.  I discussed this with the patient and his wife.  If were not able to get him in for paracentesis in a timely fashion in the next couple of days here I have encouraged him to call their GI physician and see if this can be arranged at an earlier date at [facility] where they had originally intended to have the procedure performed.  They were comfortable with the plan at discharge.    Medications - No data to display    Assessments & Plan (with Medical Decision Making)   53-year-old male with complex past medical history as discussed in the HPI who presents to the emergency department with concerns of expanding abdominal girth, suspected increase in ascites and a request for paracentesis as discussed in the HPI.  On exam the patient is a no acute distress.  He has stable normal adult range vital signs without tachycardia, tachypnea, fever or hypoxia on room air.  He has a distended abdomen with shifting dullness transmitted fluid wave  and I suspect quite likely does have significant ascites.  I reviewed most recent imaging available in the patient's chart.  Please see the discussion at the time stamp above.  This patient will certainly require therapeutic paracentesis.  He will call here after 8 AM in the morning to see if we can accommodate him at our facility in the next couple of days and if not he will need to contact his GI physician to arrange earlier paracentesis at [facility].    Disclaimer: This note consists of symbols derived from keyboarding, dictation and/or voice recognition software. As a result, there may be errors in the script that have gone undetected. Please consider this when interpreting information found in this chart.      I have reviewed the nursing notes.    I have reviewed the findings, diagnosis, plan and need for follow up with the patient.             Medication List      There are no discharge medications for this visit.         Final diagnoses:   Ascites due to alcoholic cirrhosis (H)       4/17/2019   [facility] EMERGENCY DEPARTMENT     Dontrell Rob MD  04/24/19 0701

## 2019-04-17 NOTE — ED AVS SNAPSHOT
[facility] Emergency Department  [address] [address]  Phone:  [phone]  Fax:  [phone]                                    Ivan Bell   MRN: [id number]    Department:  [facility] Emergency Department   Date of Visit:  4/17/2019           After Visit Summary Signature Page    I have received my discharge instructions, and my questions have been answered. I have discussed any challenges I see with this plan with the nurse or doctor.    ..........................................................................................................................................  Patient/Patient Representative Signature      ..........................................................................................................................................  Patient Representative Print Name and Relationship to Patient    ..................................................               ................................................  Date                                   Time    ..........................................................................................................................................  Reviewed by Signature/Title    ...................................................              ..............................................  Date                                               Time          22EPIC Rev 08/18

## 2019-04-17 NOTE — PATIENT INSTRUCTIONS
Dear Ivan Bell    Thank you for choosing [facility] ([facility]) for your procedure.  The following information is a summary of our appointment as well as important reminders.      DISCHARGE INSTRUCTIONS FOLLOWING ABDOMINAL PARACENTESIS    After you go home:    No strenuous activity for 24 hours    Resume your regular diet    Limit fluid intake for the first 48 hours to no more than 2 quarts per day.  There should be minimal drainage from the needle site.  If drainage does occur and soaks through the bandage, apply gentle pressure with your hand for 5 minutes.    Notify MD for the following:    Excessive drainage    Excessive swelling, redness or tenderness at the needle site    Fever greater than 101 degrees F    Dizziness or light-headedness when getting up or walking      IF THIS IS A MEDICAL EMERGENCY, CALL [phone]    If you have any questions or concerns    Contact the Hepatology Clinic:   [phone]    If you are a post-transplant patient, contact the Transplant Office:  [phone]    If this is after hours, contact the hospital :  [phone] and as to have the GI resident on call paged                   I have received and understand my discharge instructions and I have all of my personal belongings.          ------------------------------------------------------        ---------------------------------------------------    Patient / Significant Other's Signature     Relationship       We look forward in seeing you on your next appointment here at [facility].  Please don t hesitate to call us at [phone] to reschedule any of your appointments or to speak with one of the [facility] registered nurses.  It was a pleasure taking care of you today.    Sincerely,    [facility]  [address] [place] [address]  [address]  Phone:  [phone]

## 2019-04-25 ENCOUNTER — TELEPHONE (OUTPATIENT)
Dept: GASTROENTEROLOGY | Facility: CLINIC | Age: 54
End: 2019-04-25

## 2019-04-25 DIAGNOSIS — K70.31 ALCOHOLIC CIRRHOSIS OF LIVER WITH ASCITES (H): ICD-10-CM

## 2019-04-25 DIAGNOSIS — K76.82 HEPATIC ENCEPHALOPATHY (H): ICD-10-CM

## 2019-04-25 NOTE — TELEPHONE ENCOUNTER
Pts wife, Elicia, called as pt had paracentesis done on 4/17/19.  He has another paracentesis scheduled for 4/26/19 - does he need to go on 4/26/19, too?  Please follow up with Elicia at [phone].  Thank you!

## 2019-04-25 NOTE — TELEPHONE ENCOUNTER
Returned Elicia's call to discuss whether pt needs paracentesis appointment for 4/26. Explained to Elicia that it depends on pt's symptoms and whether pt feels uncomfortable from ascites. Elicia stated that she will keep this appointment until she can talk to pt and will cancel the appointment if not needed. Provided Elicia with the phone number for the [facility].    normal (ped)...

## 2019-04-26 RX ORDER — LACTULOSE 10 G/15ML
SOLUTION ORAL
Qty: 1000 ML | Refills: 11 | Status: SHIPPED | OUTPATIENT
Start: 2019-04-26 | End: 2019-12-12

## 2019-05-02 ENCOUNTER — ANCILLARY PROCEDURE (OUTPATIENT)
Dept: ULTRASOUND IMAGING | Facility: CLINIC | Age: 54
End: 2019-05-02
Attending: PHYSICIAN ASSISTANT
Payer: COMMERCIAL

## 2019-05-02 ENCOUNTER — OFFICE VISIT (OUTPATIENT)
Dept: INFUSION THERAPY | Facility: CLINIC | Age: 54
End: 2019-05-02
Attending: PHYSICIAN ASSISTANT
Payer: COMMERCIAL

## 2019-05-02 VITALS
WEIGHT: 155.1 LBS | SYSTOLIC BLOOD PRESSURE: 119 MMHG | OXYGEN SATURATION: 100 % | RESPIRATION RATE: 16 BRPM | DIASTOLIC BLOOD PRESSURE: 71 MMHG | TEMPERATURE: 97.8 F | BODY MASS INDEX: 22.25 KG/M2

## 2019-05-02 DIAGNOSIS — K70.31 ALCOHOLIC CIRRHOSIS OF LIVER WITH ASCITES (H): Primary | ICD-10-CM

## 2019-05-02 DIAGNOSIS — Z95.828 S/P TIPS (TRANSJUGULAR INTRAHEPATIC PORTOSYSTEMIC SHUNT): ICD-10-CM

## 2019-05-02 LAB
ANION GAP SERPL CALCULATED.3IONS-SCNC: 12 MMOL/L (ref 3–14)
BUN SERPL-MCNC: 13 MG/DL (ref 7–30)
CALCIUM SERPL-MCNC: 8.7 MG/DL (ref 8.5–10.1)
CHLORIDE SERPL-SCNC: 105 MMOL/L (ref 94–109)
CO2 SERPL-SCNC: 15 MMOL/L (ref 20–32)
CREAT SERPL-MCNC: 1.2 MG/DL (ref 0.66–1.25)
GFR SERPL CREATININE-BSD FRML MDRD: 67 ML/MIN/{1.73_M2}
GLUCOSE SERPL-MCNC: 90 MG/DL (ref 70–99)
POTASSIUM SERPL-SCNC: 3.9 MMOL/L (ref 3.4–5.3)
SODIUM SERPL-SCNC: 132 MMOL/L (ref 133–144)

## 2019-05-02 PROCEDURE — 40000141 ZZH STATISTIC PERIPHERAL IV START W/O US GUIDANCE: Mod: ZF

## 2019-05-02 PROCEDURE — 25000125 ZZHC RX 250: Mod: ZF | Performed by: PHYSICIAN ASSISTANT

## 2019-05-02 PROCEDURE — 80048 BASIC METABOLIC PNL TOTAL CA: CPT | Performed by: PHYSICIAN ASSISTANT

## 2019-05-02 PROCEDURE — 27210190 US PARACENTESIS

## 2019-05-02 PROCEDURE — P9047 ALBUMIN (HUMAN), 25%, 50ML: HCPCS | Mod: ZF | Performed by: PHYSICIAN ASSISTANT

## 2019-05-02 PROCEDURE — 25000128 H RX IP 250 OP 636: Mod: ZF | Performed by: PHYSICIAN ASSISTANT

## 2019-05-02 RX ORDER — ALBUMIN (HUMAN) 12.5 G/50ML
12.5 SOLUTION INTRAVENOUS 4 TIMES DAILY PRN
Status: DISCONTINUED | OUTPATIENT
Start: 2019-05-02 | End: 2019-05-02 | Stop reason: HOSPADM

## 2019-05-02 RX ORDER — ALBUMIN (HUMAN) 12.5 G/50ML
12.5 SOLUTION INTRAVENOUS 4 TIMES DAILY PRN
Status: CANCELLED
Start: 2019-05-02

## 2019-05-02 RX ADMIN — ALBUMIN HUMAN 12.5 G: 0.25 SOLUTION INTRAVENOUS at 08:37

## 2019-05-02 RX ADMIN — ALBUMIN HUMAN 12.5 G: 0.25 SOLUTION INTRAVENOUS at 08:29

## 2019-05-02 RX ADMIN — LIDOCAINE HYDROCHLORIDE 10 ML: 10 INJECTION, SOLUTION EPIDURAL; INFILTRATION; INTRACAUDAL; PERINEURAL at 08:08

## 2019-05-02 RX ADMIN — ALBUMIN HUMAN 12.5 G: 0.25 SOLUTION INTRAVENOUS at 08:20

## 2019-05-02 NOTE — PROGRESS NOTES
Paracentesis Nursing Note  Ivan Bell [facility].    During today's appointment orders from ROGER Herrera were completed.    Progress Note:  Patient identification verified by name and date of birth.  Assessment completed.  Vitals monitored throughout appointment and recorded in Doc Flowsheets.  See proceduralist note in ultrasound.    Vascular Access: peripheral IV placed today by vascular access RN.  Labs: BMP drawn per orders.     Date of consent or authorization: 4/17/2019.  Invasive Procedure Safety Checklist was completed and sent for scanning.     Paracentesis performed by Jean Sanchez PA-C Radiology and [employer]OG[facility] Mendoza student.    The following labs were communicated to provider performing paracentesis:  Lab Results   Component Value Date    PLT 77 04/17/2019       Total amount of ascites fluid drained: 3.4 liters.  Color of ascites fluid: yellow clear  Total amount of albumin given: 37.5  grams.    Patient tolerated procedure well.    Post procedure,denies pain or discomfort post paracentesis.      Discharge Plan:  Discharge instructions were reviewed with patient.  Patient/Representative verbalized understanding and all questions were answered.   Discharged from [facility] in stable condition.    Vishal Sharp RN     Administrations This Visit     albumin human 25 % injection 12.5 g     Admin Date  05/02/2019 Action  New Bag Dose  12.5 g Route  Intravenous Administered By  Vishal Sharp RN           Admin Date  05/02/2019 Action  New Bag Dose  12.5 g Route  Intravenous Administered By  Vishal Sharp RN           Admin Date  05/02/2019 Action  New Bag Dose  12.5 g Route  Intravenous Administered By  Vishal Sharp RN          lidocaine 1 % 20 mL     Admin Date  05/02/2019 Action  Given by Other Dose  10 mL Route  Other Administered By  Vishal Sharp RN               Vital signs:  Temp:  "97.8  F (36.6  C) Temp src: Oral       Resp: 20 SpO2: 100 % O2 Device: None (Room air)     Weight: 73.7 kg (162 lb 6.4 oz)  Estimated body mass index is 23.3 kg/m  as calculated from the following:    Height as of 4/17/19: 1.778 m (5' 10\").    Weight as of this encounter: 73.7 kg (162 lb 6.4 oz).              "

## 2019-05-02 NOTE — PATIENT INSTRUCTIONS
Dear Ivan Bell    Thank you for choosing [facility] ([facility]) for your paracentesis.  The following information is a summary of our appointment as well as important reminders.      Patient Education     Discharge Instructions for Paracentesis  Paracentesis is a procedure to remove extra fluid from your belly (abdomen). This fluid buildup in the abdomen is called ascites. The procedure may have been done to take a sample of the fluid. Or, it may have been done to drain the extra fluid from your abdomen and help make you more comfortable.     Ascites is buildup of excess fluid in the abdomen.   Home care    If you have pain after the procedure, your healthcare provider can prescribe or recommend pain medicines. Take these exactly as directed. If you stopped taking other medicines before the procedure, ask your provider when you can start them again.    Take it easy for 24 hours after the procedure. Avoid physical activity until your provider says it s OK.    You will have a small bandage over the puncture site. Stitches (sutures), surgical staples, adhesive tapes, adhesive strips, or surgical glue may be used to close the incision. They also help stop bleeding and speed healing. You may take the bandage off in 24 hours.    Check the puncture site for the signs of infection listed below.  Follow-up care  Make a follow-up appointment with your healthcare provider as directed. During your follow-up visit, your provider will check your healing. Let your provider know how you are feeling. You can also discuss the cause of your ascites and if you need any further treatment.  When to seek medical advice  Call your healthcare provider if you have any of the following after the procedure:    A fever of 100.4 F (38.0 C) or higher    Trouble breathing    Pain that doesn't go away even after taking pain medicine    Belly pain not caused by having the skin  punctured    Bleeding from the puncture site    More than a small amount of fluid leaking from the puncture site    Swollen belly    Signs of infection at the puncture site. These include increased pain, redness, or swelling, warmth, or bad-smelling drainage.    Blood in your urine    Feeling dizzy or lightheaded, or fainting   Date Last Reviewed: 7/1/2016 2000-2018 [employer]. [address] [place] [address]. All rights reserved. This information is not intended as a substitute for professional medical care. Always follow your healthcare professional's instructions.             We look forward in seeing you on your next appointment here at [facility].  Please don t hesitate to call us at [phone] to reschedule any of your appointments or to speak with one of the [facility] registered nurses.  It was a pleasure taking care of you today.    Sincerely,    [facility]  [facility]  [address] [place] [address]  [address]  Phone:  [phone]

## 2019-05-03 ENCOUNTER — PATIENT OUTREACH (OUTPATIENT)
Dept: GASTROENTEROLOGY | Facility: CLINIC | Age: 54
End: 2019-05-03

## 2019-05-03 NOTE — PROGRESS NOTES
Patient's wife Elicia returned call and reviewed plan after recent lab results per Celestino Verduzco PA-C: Kidney labs look okay. Continue lasix and spironolactone at current dose. Repeat labs at next visit on May 17, 2019.   Elicia requests his para appointment be changed to 5/17 following clinic appointment, and [facility] was able to accommodate this.

## 2019-05-08 ENCOUNTER — DOCUMENTATION ONLY (OUTPATIENT)
Dept: CARE COORDINATION | Facility: CLINIC | Age: 54
End: 2019-05-08

## 2019-05-13 ENCOUNTER — DOCUMENTATION ONLY (OUTPATIENT)
Dept: CARE COORDINATION | Facility: CLINIC | Age: 54
End: 2019-05-13

## 2019-05-16 ENCOUNTER — OFFICE VISIT (OUTPATIENT)
Dept: INFUSION THERAPY | Facility: CLINIC | Age: 54
End: 2019-05-16
Attending: PHYSICIAN ASSISTANT
Payer: COMMERCIAL

## 2019-05-16 ENCOUNTER — ANCILLARY PROCEDURE (OUTPATIENT)
Dept: ULTRASOUND IMAGING | Facility: CLINIC | Age: 54
End: 2019-05-16
Attending: PHYSICIAN ASSISTANT
Payer: COMMERCIAL

## 2019-05-16 ENCOUNTER — OFFICE VISIT (OUTPATIENT)
Dept: GASTROENTEROLOGY | Facility: CLINIC | Age: 54
End: 2019-05-16
Attending: PHYSICIAN ASSISTANT
Payer: COMMERCIAL

## 2019-05-16 VITALS
HEIGHT: 70 IN | SYSTOLIC BLOOD PRESSURE: 131 MMHG | HEART RATE: 97 BPM | TEMPERATURE: 97.9 F | DIASTOLIC BLOOD PRESSURE: 76 MMHG | BODY MASS INDEX: 23.65 KG/M2 | WEIGHT: 165.2 LBS

## 2019-05-16 VITALS
BODY MASS INDEX: 22.47 KG/M2 | DIASTOLIC BLOOD PRESSURE: 65 MMHG | TEMPERATURE: 97.5 F | WEIGHT: 156.6 LBS | HEART RATE: 90 BPM | SYSTOLIC BLOOD PRESSURE: 116 MMHG | OXYGEN SATURATION: 100 %

## 2019-05-16 DIAGNOSIS — K70.31 ALCOHOLIC CIRRHOSIS OF LIVER WITH ASCITES (H): Primary | ICD-10-CM

## 2019-05-16 DIAGNOSIS — Z95.828 S/P TIPS (TRANSJUGULAR INTRAHEPATIC PORTOSYSTEMIC SHUNT): ICD-10-CM

## 2019-05-16 DIAGNOSIS — K70.31 ALCOHOLIC CIRRHOSIS OF LIVER WITH ASCITES (H): ICD-10-CM

## 2019-05-16 LAB
ALBUMIN SERPL-MCNC: 2.4 G/DL (ref 3.4–5)
ALP SERPL-CCNC: 245 U/L (ref 40–150)
ALT SERPL W P-5'-P-CCNC: 22 U/L (ref 0–70)
ANION GAP SERPL CALCULATED.3IONS-SCNC: 10 MMOL/L (ref 3–14)
AST SERPL W P-5'-P-CCNC: 62 U/L (ref 0–45)
BASOPHILS # BLD AUTO: 0 10E9/L (ref 0–0.2)
BASOPHILS NFR BLD AUTO: 0.6 %
BILIRUB DIRECT SERPL-MCNC: 8 MG/DL (ref 0–0.2)
BILIRUB SERPL-MCNC: 11.5 MG/DL (ref 0.2–1.3)
BUN SERPL-MCNC: 14 MG/DL (ref 7–30)
CALCIUM SERPL-MCNC: 9.3 MG/DL (ref 8.5–10.1)
CHLORIDE SERPL-SCNC: 108 MMOL/L (ref 94–109)
CO2 SERPL-SCNC: 18 MMOL/L (ref 20–32)
COPATH REPORT: NORMAL
CREAT SERPL-MCNC: 1.47 MG/DL (ref 0.66–1.25)
DIFFERENTIAL METHOD BLD: ABNORMAL
EOSINOPHIL # BLD AUTO: 0.3 10E9/L (ref 0–0.7)
EOSINOPHIL NFR BLD AUTO: 5.4 %
ERYTHROCYTE [DISTWIDTH] IN BLOOD BY AUTOMATED COUNT: 17.6 % (ref 10–15)
GFR SERPL CREATININE-BSD FRML MDRD: 53 ML/MIN/{1.73_M2}
GLUCOSE SERPL-MCNC: 95 MG/DL (ref 70–99)
HAPTOGLOB SERPL-MCNC: <6 MG/DL (ref 15–200)
HCT VFR BLD AUTO: 29.6 % (ref 40–53)
HGB BLD-MCNC: 9.8 G/DL (ref 13.3–17.7)
IMM GRANULOCYTES # BLD: 0 10E9/L (ref 0–0.4)
IMM GRANULOCYTES NFR BLD: 0.6 %
INR PPP: 1.82 (ref 0.86–1.14)
LDH SERPL L TO P-CCNC: 283 U/L (ref 85–227)
LYMPHOCYTES # BLD AUTO: 0.8 10E9/L (ref 0.8–5.3)
LYMPHOCYTES NFR BLD AUTO: 15.9 %
MCH RBC QN AUTO: 32.7 PG (ref 26.5–33)
MCHC RBC AUTO-ENTMCNC: 33.1 G/DL (ref 31.5–36.5)
MCV RBC AUTO: 99 FL (ref 78–100)
MONOCYTES # BLD AUTO: 0.5 10E9/L (ref 0–1.3)
MONOCYTES NFR BLD AUTO: 8.9 %
NEUTROPHILS # BLD AUTO: 3.6 10E9/L (ref 1.6–8.3)
NEUTROPHILS NFR BLD AUTO: 68.6 %
NRBC # BLD AUTO: 0 10*3/UL
NRBC BLD AUTO-RTO: 0 /100
PLATELET # BLD AUTO: 74 10E9/L (ref 150–450)
POTASSIUM SERPL-SCNC: 3.8 MMOL/L (ref 3.4–5.3)
PROT SERPL-MCNC: 5.8 G/DL (ref 6.8–8.8)
RBC # BLD AUTO: 3 10E12/L (ref 4.4–5.9)
RETICS # AUTO: 69.9 10E9/L (ref 25–95)
RETICS/RBC NFR AUTO: 2.3 % (ref 0.5–2)
SODIUM SERPL-SCNC: 136 MMOL/L (ref 133–144)
WBC # BLD AUTO: 5.2 10E9/L (ref 4–11)

## 2019-05-16 PROCEDURE — G0463 HOSPITAL OUTPT CLINIC VISIT: HCPCS | Mod: 25

## 2019-05-16 PROCEDURE — 83010 ASSAY OF HAPTOGLOBIN QUANT: CPT | Performed by: PHYSICIAN ASSISTANT

## 2019-05-16 PROCEDURE — 80076 HEPATIC FUNCTION PANEL: CPT | Performed by: PHYSICIAN ASSISTANT

## 2019-05-16 PROCEDURE — 25000128 H RX IP 250 OP 636: Mod: ZF | Performed by: PHYSICIAN ASSISTANT

## 2019-05-16 PROCEDURE — 36415 COLL VENOUS BLD VENIPUNCTURE: CPT | Performed by: PHYSICIAN ASSISTANT

## 2019-05-16 PROCEDURE — 25000125 ZZHC RX 250: Mod: ZF | Performed by: PHYSICIAN ASSISTANT

## 2019-05-16 PROCEDURE — 85025 COMPLETE CBC W/AUTO DIFF WBC: CPT | Performed by: PHYSICIAN ASSISTANT

## 2019-05-16 PROCEDURE — 80048 BASIC METABOLIC PNL TOTAL CA: CPT | Performed by: PHYSICIAN ASSISTANT

## 2019-05-16 PROCEDURE — 85610 PROTHROMBIN TIME: CPT | Performed by: PHYSICIAN ASSISTANT

## 2019-05-16 PROCEDURE — 27210190 US PARACENTESIS

## 2019-05-16 PROCEDURE — 40000611 ZZHCL STATISTIC MORPHOLOGY W/INTERP HEMEPATH TC 85060: Performed by: PHYSICIAN ASSISTANT

## 2019-05-16 PROCEDURE — P9047 ALBUMIN (HUMAN), 25%, 50ML: HCPCS | Mod: ZF | Performed by: PHYSICIAN ASSISTANT

## 2019-05-16 PROCEDURE — 85045 AUTOMATED RETICULOCYTE COUNT: CPT | Performed by: PHYSICIAN ASSISTANT

## 2019-05-16 PROCEDURE — 83615 LACTATE (LD) (LDH) ENZYME: CPT | Performed by: PHYSICIAN ASSISTANT

## 2019-05-16 RX ORDER — ALBUMIN (HUMAN) 12.5 G/50ML
12.5 SOLUTION INTRAVENOUS 4 TIMES DAILY PRN
Status: CANCELLED
Start: 2019-05-16

## 2019-05-16 RX ORDER — ALBUMIN (HUMAN) 12.5 G/50ML
12.5 SOLUTION INTRAVENOUS 4 TIMES DAILY PRN
Status: DISCONTINUED | OUTPATIENT
Start: 2019-05-16 | End: 2019-05-16 | Stop reason: HOSPADM

## 2019-05-16 RX ADMIN — LIDOCAINE HYDROCHLORIDE 10 ML: 10 INJECTION, SOLUTION EPIDURAL; INFILTRATION; INTRACAUDAL; PERINEURAL at 10:22

## 2019-05-16 RX ADMIN — ALBUMIN HUMAN 12.5 G: 0.25 SOLUTION INTRAVENOUS at 10:27

## 2019-05-16 RX ADMIN — ALBUMIN HUMAN 12.5 G: 0.25 SOLUTION INTRAVENOUS at 10:36

## 2019-05-16 RX ADMIN — ALBUMIN HUMAN 12.5 G: 0.25 SOLUTION INTRAVENOUS at 10:42

## 2019-05-16 RX ADMIN — ALBUMIN HUMAN 12.5 G: 0.25 SOLUTION INTRAVENOUS at 10:22

## 2019-05-16 ASSESSMENT — MIFFLIN-ST. JEOR: SCORE: 1590.59

## 2019-05-16 ASSESSMENT — PAIN SCALES - GENERAL: PAINLEVEL: NO PAIN (0)

## 2019-05-16 NOTE — NURSING NOTE
"/76   Pulse 97   Temp 97.9  F (36.6  C) (Oral)   Ht 1.778 m (5' 10\")   Wt 74.9 kg (165 lb 3.2 oz)   BMI 23.70 kg/m    Chief Complaint   Patient presents with     RECHECK     follow up with cirrhosis, tzimmer cma       "

## 2019-05-16 NOTE — PROGRESS NOTES
Paracentesis Nursing Note  Ivan Bell presents today to [facility] for a paracentesis.    During today's appointment orders from Celestino Verduzco PA-C were completed.    Progress Note:  Patient identification verified by name and date of birth.  Assessment completed.  Vitals monitored throughout appointment and recorded in Doc Flowsheets.  See proceduralist note in ultrasound.    Vascular Access: peripheral IV placed today.  Labs: were not ordered for this appointment.    Date of consent or authorization: 4/17/19.  Invasive Procedure Safety Checklist was completed and sent for scanning.     Paracentesis performed by Jatinder Jack PA-C Radiology.    The following labs were communicated to provider performing paracentesis:  Lab Results   Component Value Date    PLT 74 05/16/2019       Total amount of ascites fluid drained: 4.7 liters.  Color of ascites fluid: yellow.  Total amount of albumin given: 50  grams.    Patient tolerated procedure well.    Post procedure,denies pain or discomfort post paracentesis.      Discharge Plan:  Discharge instructions were reviewed with patient.  Patient/Representative verbalized understanding and all questions were answered.   Discharged from [facility] in stable condition.    Maya Monroe RN       Administrations This Visit     albumin human 25 % injection 12.5 g     Admin Date  05/16/2019 Action  New Bag Dose  12.5 g Route  Intravenous Administered By  Maya Monroe RN           Admin Date  05/16/2019 Action  New Bag Dose  12.5 g Route  Intravenous Administered By  Maya Monroe, DILIA           Admin Date  05/16/2019 Action  New Bag Dose  12.5 g Route  Intravenous Administered By  Maya Monroe RN           Admin Date  05/16/2019 Action  New Bag Dose  12.5 g Route  Intravenous Administered By  Maya Monroe, DILIA          lidocaine 1 % 20 mL     Admin Date  05/16/2019 Action  Given by Other  Dose  10 mL Route  Other Administered By  Maya Monroe, RN                  /67   Pulse 90   Temp 97.5  F (36.4  C) (Oral)   Wt 75.4 kg (166 lb 3.2 oz)   SpO2 100%   BMI 23.85 kg/m

## 2019-05-16 NOTE — PROGRESS NOTES
Hepatology Follow-up Clinic note  Ivan Bell   Date of Birth 1965  Date of Service 5/16/2019    Reason for follow-up: ETOH cirrhosis          Assessment/plan:   Ivan Bell is a 53 year old male with ETOH cirrhosis recent hospitalization for Alcohol Hepatitis. He has been sober since January 2019. His liver disease have been complicated by history of ascites s/p TIPs placement w/ last revision in 6/6/2018 who is requiring regular paracentesis again, history of hepatic encephalopathy and history of variceal bleeding. He hepatic encephalopathy is well controlled on current regimen. His liver function has improved. MELD-Na 26 (previously 28). His bilirubin has improved from 25.7 to 11.5. We discussed that sobriety and good nutrition continues to be important as he recovers. Discussed recommendation for behavior therapy referral or chemical dependency evaluation and he is open to referral. His anemia is due to liver disease and his hemoglobin has remained stable. He is up to date with HCC screening and does not need EGD due to patent TIPS.     # History of ascites:   - Continue 100 mg spironolactone, 20 mg Lasix   - Paracentesis prn   # HCC screening/TIPs patency with abdominal ultrasound w/ doppler   # Follow up with me in 2 months   # Follow-up in clinic with Dr. Bales or sooner as needed in October    Celestino Verduzco PA-C   [facility] Hepatology clinic    -----------------------------------------------------       HPI:   Ivan Bell is a 53 year old male presenting for follow-up.     Cirrhosis  - ETOH  - hx ascites, TIPS placement 5/14/18, 6/6/18  - hx HE  - hx variceal bleed Oct 2017  - last EGD Apr 2018- medium EV with banding x 4, mild portal hypertensive gastropathy  - HCC screening- 4/13/2019: liver doppler U/S  (P)    Patient was last seen by me on 4/12/2019. No recent hospitalizations or ER visits. No new medications.     Patient has had two paracentesis over the past month, 4.5 L and 3.5  L removed. He has continued taking 100 mg spironolactone, 20 mg lasix and 40 mEq of potassium. His appetite is improved. He is eating smaller more frequent meals. He denies any confusion. He is taking taking lactulose and having 3-4 bowel movements a day.     Patient denies lower extremity edema. Patient also denies melena, hematochezia or hematemesis. Patient denies fevers, sweats or chills.    No alcohol since the end of January 2019.  He has never been to any chemical dependency treatment. He has not been to see behavior therapy. 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     Medical hx Surgical hx   Past Medical History:   Diagnosis Date     Ascites      Cirrhosis (H)      Esophageal varices (H)      Hepatitis      Hypertension      Left calcaneus fracture 1/9/2006     Portal vein thrombosis     Past Surgical History:   Procedure Laterality Date     ANKLE SURGERY Left      COLONOSCOPY N/A 3/31/2016    Procedure: COLONOSCOPY;  Surgeon: Rhys Uriostegui MD;  Location:  GI     ESOPHAGOSCOPY, GASTROSCOPY, DUODENOSCOPY (EGD), COMBINED N/A 3/31/2016    Procedure: COMBINED ESOPHAGOSCOPY, GASTROSCOPY, DUODENOSCOPY (EGD);  Surgeon: Rhys Uriostegui MD;  Location:  GI     ESOPHAGOSCOPY, GASTROSCOPY, DUODENOSCOPY (EGD), COMBINED N/A 3/9/2018    Procedure: COMBINED ESOPHAGOSCOPY, GASTROSCOPY, DUODENOSCOPY (EGD), BIOPSY SINGLE OR MULTIPLE;  EGD;  Surgeon: Gonzalo Wahl MD;  Location:  GI     KNEE SURGERY Left      KNEE SURGERY Right      SIGMOIDOSCOPY FLEXIBLE N/A 10/31/2017    Procedure: SIGMOIDOSCOPY FLEXIBLE;;  Surgeon: Armaan Adams MD;  Location:  GI     TIPS Procedure  06/06/2018                 Medications:     Current Outpatient Medications   Medication     CONSTULOSE 10 GM/15ML solution     folic acid (FOLVITE) 1 MG tablet     furosemide (LASIX) 20 MG tablet     lactulose (CEPHULAC) 20 GM Packet     MULTIPLE VITAMINS PO      "pantoprazole (PROTONIX) 40 MG EC tablet     potassium chloride ER (K-DUR/KLOR-CON M) 20 MEQ CR tablet     rifaximin (XIFAXAN) 550 MG TABS tablet     spironolactone (ALDACTONE) 50 MG tablet     vitamin B1 (THIAMINE) 100 MG tablet     No current facility-administered medications for this visit.             Allergies:     Allergies   Allergen Reactions     Benadryl [Diphenhydramine] Other (See Comments)     Delirium (visual and auditory hallucinations)     Oxycodone Other (See Comments)     Delirium and constipation            Review of Systems:   10 points ROS was obtained and highlighted in the HPI, otherwise negative.          Physical Exam:   VS:  /76   Pulse 97   Temp 97.9  F (36.6  C) (Oral)   Ht 1.778 m (5' 10\")   Wt 74.9 kg (165 lb 3.2 oz)   BMI 23.70 kg/m        Gen: A&Ox3, NAD, jaundiced, thin  HEENT: icteric   CV: RRR, no overt murmurs  Lung: CTA Bilatererally, no wheezing or crackles.   Lym- no palpable lymphadenopathy  Abd: soft, NT, ND,  Minimal amount of ascites  Ext: no edema, intact pulses.   Skin: No rash, no palmar erythema, telangiectasias or jaundice  Neuro: grossly intact, no asterixis   Psych: appropriate mood and affects           Data:   Reviewed in person and significant for:    Lab Results   Component Value Date     05/16/2019      Lab Results   Component Value Date    POTASSIUM 3.8 05/16/2019     Lab Results   Component Value Date    CHLORIDE 108 05/16/2019     Lab Results   Component Value Date    CO2 18 05/16/2019     Lab Results   Component Value Date    BUN 14 05/16/2019     Lab Results   Component Value Date    CR 1.47 05/16/2019       Lab Results   Component Value Date    WBC 5.2 05/16/2019     Lab Results   Component Value Date    HGB 9.8 05/16/2019     Lab Results   Component Value Date    HCT 29.6 05/16/2019     Lab Results   Component Value Date    MCV 99 05/16/2019     Lab Results   Component Value Date    PLT 74 05/16/2019       Lab Results   Component Value Date "    AST 62 05/16/2019     Lab Results   Component Value Date    ALT 22 05/16/2019     Lab Results   Component Value Date    BILICONJ 0.1 03/30/2011      Lab Results   Component Value Date    BILITOTAL 11.5 05/16/2019       Lab Results   Component Value Date    ALBUMIN 2.4 05/16/2019     Lab Results   Component Value Date    PROTTOTAL 5.8 05/16/2019      Lab Results   Component Value Date    ALKPHOS 245 05/16/2019       Lab Results   Component Value Date    INR 1.82 05/16/2019 4/13/2019:   US ABDOMEN COMPLETE w/ TIPS DOPPLER:   1. Improvement in mid stent velocities of the TIPS. Continued expected  retrograde flow in the right and left portal veins.  2. Cirrhotic appearing liver with evidence of portal hypertension with  moderate to large volume ascites and splenomegaly. No focal mass.  3. Multiple bilateral renal stones. No hydronephrosis.

## 2019-05-16 NOTE — PATIENT INSTRUCTIONS
Patient Education     Discharge Instructions for Paracentesis  Paracentesis is a procedure to remove extra fluid from your belly (abdomen). This fluid buildup in the abdomen is called ascites. The procedure may have been done to take a sample of the fluid. Or, it may have been done to drain the extra fluid from your abdomen and help make you more comfortable.     Ascites is buildup of excess fluid in the abdomen.   Home care    If you have pain after the procedure, your healthcare provider can prescribe or recommend pain medicines. Take these exactly as directed. If you stopped taking other medicines before the procedure, ask your provider when you can start them again.    Take it easy for 24 hours after the procedure. Avoid physical activity until your provider says it s OK.    You will have a small bandage over the puncture site. Stitches (sutures), surgical staples, adhesive tapes, adhesive strips, or surgical glue may be used to close the incision. They also help stop bleeding and speed healing. You may take the bandage off in 24 hours.    Check the puncture site for the signs of infection listed below.  Follow-up care  Make a follow-up appointment with your healthcare provider as directed. During your follow-up visit, your provider will check your healing. Let your provider know how you are feeling. You can also discuss the cause of your ascites and if you need any further treatment.  When to seek medical advice  Call your healthcare provider if you have any of the following after the procedure:    A fever of 100.4 F (38.0 C) or higher    Trouble breathing    Pain that doesn't go away even after taking pain medicine    Belly pain not caused by having the skin punctured    Bleeding from the puncture site    More than a small amount of fluid leaking from the puncture site    Swollen belly    Signs of infection at the puncture site. These include increased pain, redness, or swelling, warmth, or bad-smelling  drainage.    Blood in your urine    Feeling dizzy or lightheaded, or fainting   Date Last Reviewed: 7/1/2016 2000-2018 [employer]. [address] [place] [address], [place] [address]. All rights reserved. This information is not intended as a substitute for professional medical care. Always follow your healthcare professional's instructions.

## 2019-05-16 NOTE — LETTER
5/16/2019      RE: Ivan Bell  [address] [facility] [id number]         Hepatology Follow-up Clinic note  Ivan Bell   Date of Birth 1965  Date of Service 5/16/2019    Reason for follow-up: ETOH cirrhosis          Assessment/plan:   Ivan Bell is a 53 year old male with ETOH cirrhosis recent hospitalization for Alcohol Hepatitis. He has been sober since January 2019. His liver disease have been complicated by history of ascites s/p TIPs placement w/ last revision in 6/6/2018 who is requiring regular paracentesis again, history of hepatic encephalopathy and history of variceal bleeding. He hepatic encephalopathy is well controlled on current regimen. His liver function has improved. MELD-Na 26 (previously 28). His bilirubin has improved from 25.7 to 11.5. We discussed that sobriety and good nutrition continues to be important as he recovers. Discussed recommendation for behavior therapy referral or chemical dependency evaluation and he is open to referral. His anemia is due to liver disease and his hemoglobin has remained stable. He is up to date with HCC screening and does not need EGD due to patent TIPS.     # History of ascites:   - Continue 100 mg spironolactone, 20 mg Lasix   - Paracentesis prn   # HCC screening/TIPs patency with abdominal ultrasound w/ doppler   # Follow up with me in 2 months   # Follow-up in clinic with Dr. Bales or sooner as needed in October    Celestino Verduzco PA-C   [facility] Hepatology clinic    -----------------------------------------------------       HPI:   Ivan Bell is a 53 year old male presenting for follow-up.     Cirrhosis  - ETOH  - hx ascites, TIPS placement 5/14/18, 6/6/18  - hx HE  - hx variceal bleed Oct 2017  - last EGD Apr 2018- medium EV with banding x 4, mild portal hypertensive gastropathy  - HCC screening- 4/13/2019: liver doppler U/S  (P)    Patient was last seen by me on 4/12/2019. No recent hospitalizations or ER visits. No  new medications.     Patient has had two paracentesis over the past month, 4.5 L and 3.5 L removed. He has continued taking 100 mg spironolactone, 20 mg lasix and 40 mEq of potassium. His appetite is improved. He is eating smaller more frequent meals. He denies any confusion. He is taking taking lactulose and having 3-4 bowel movements a day.     Patient denies lower extremity edema. Patient also denies melena, hematochezia or hematemesis. Patient denies fevers, sweats or chills.    No alcohol since the end of January 2019.  He has never been to any chemical dependency treatment. He has not been to see behavior therapy. 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     Medical hx Surgical hx   Past Medical History:   Diagnosis Date     Ascites      Cirrhosis (H)      Esophageal varices (H)      Hepatitis      Hypertension      Left calcaneus fracture 1/9/2006     Portal vein thrombosis     Past Surgical History:   Procedure Laterality Date     ANKLE SURGERY Left      COLONOSCOPY N/A 3/31/2016    Procedure: COLONOSCOPY;  Surgeon: Rhys Uriostegui MD;  Location:  GI     ESOPHAGOSCOPY, GASTROSCOPY, DUODENOSCOPY (EGD), COMBINED N/A 3/31/2016    Procedure: COMBINED ESOPHAGOSCOPY, GASTROSCOPY, DUODENOSCOPY (EGD);  Surgeon: Rhys Uriostegui MD;  Location:  GI     ESOPHAGOSCOPY, GASTROSCOPY, DUODENOSCOPY (EGD), COMBINED N/A 3/9/2018    Procedure: COMBINED ESOPHAGOSCOPY, GASTROSCOPY, DUODENOSCOPY (EGD), BIOPSY SINGLE OR MULTIPLE;  EGD;  Surgeon: Gonzalo Wahl MD;  Location:  GI     KNEE SURGERY Left      KNEE SURGERY Right      SIGMOIDOSCOPY FLEXIBLE N/A 10/31/2017    Procedure: SIGMOIDOSCOPY FLEXIBLE;;  Surgeon: Armaan Adams MD;  Location:  GI     TIPS Procedure  06/06/2018                 Medications:     Current Outpatient Medications   Medication     CONSTULOSE 10 GM/15ML solution     folic acid (FOLVITE) 1 MG tablet     furosemide  "(LASIX) 20 MG tablet     lactulose (CEPHULAC) 20 GM Packet     MULTIPLE VITAMINS PO     pantoprazole (PROTONIX) 40 MG EC tablet     potassium chloride ER (K-DUR/KLOR-CON M) 20 MEQ CR tablet     rifaximin (XIFAXAN) 550 MG TABS tablet     spironolactone (ALDACTONE) 50 MG tablet     vitamin B1 (THIAMINE) 100 MG tablet     No current facility-administered medications for this visit.             Allergies:     Allergies   Allergen Reactions     Benadryl [Diphenhydramine] Other (See Comments)     Delirium (visual and auditory hallucinations)     Oxycodone Other (See Comments)     Delirium and constipation            Review of Systems:   10 points ROS was obtained and highlighted in the HPI, otherwise negative.          Physical Exam:   VS:  /76   Pulse 97   Temp 97.9  F (36.6  C) (Oral)   Ht 1.778 m (5' 10\")   Wt 74.9 kg (165 lb 3.2 oz)   BMI 23.70 kg/m         Gen: A&Ox3, NAD, jaundiced, thin  HEENT: icteric   CV: RRR, no overt murmurs  Lung: CTA Bilatererally, no wheezing or crackles.   Lym- no palpable lymphadenopathy  Abd: soft, NT, ND,  Minimal amount of ascites  Ext: no edema, intact pulses.   Skin: No rash, no palmar erythema, telangiectasias or jaundice  Neuro: grossly intact, no asterixis   Psych: appropriate mood and affects           Data:   Reviewed in person and significant for:    Lab Results   Component Value Date     05/16/2019      Lab Results   Component Value Date    POTASSIUM 3.8 05/16/2019     Lab Results   Component Value Date    CHLORIDE 108 05/16/2019     Lab Results   Component Value Date    CO2 18 05/16/2019     Lab Results   Component Value Date    BUN 14 05/16/2019     Lab Results   Component Value Date    CR 1.47 05/16/2019       Lab Results   Component Value Date    WBC 5.2 05/16/2019     Lab Results   Component Value Date    HGB 9.8 05/16/2019     Lab Results   Component Value Date    HCT 29.6 05/16/2019     Lab Results   Component Value Date    MCV 99 05/16/2019     Lab " Results   Component Value Date    PLT 74 05/16/2019       Lab Results   Component Value Date    AST 62 05/16/2019     Lab Results   Component Value Date    ALT 22 05/16/2019     Lab Results   Component Value Date    BILICONJ 0.1 03/30/2011      Lab Results   Component Value Date    BILITOTAL 11.5 05/16/2019       Lab Results   Component Value Date    ALBUMIN 2.4 05/16/2019     Lab Results   Component Value Date    PROTTOTAL 5.8 05/16/2019      Lab Results   Component Value Date    ALKPHOS 245 05/16/2019       Lab Results   Component Value Date    INR 1.82 05/16/2019 4/13/2019:   US ABDOMEN COMPLETE w/ TIPS DOPPLER:   1. Improvement in mid stent velocities of the TIPS. Continued expected  retrograde flow in the right and left portal veins.  2. Cirrhotic appearing liver with evidence of portal hypertension with  moderate to large volume ascites and splenomegaly. No focal mass.  3. Multiple bilateral renal stones. No hydronephrosis.      Celestino Verduzco PA-C

## 2019-05-23 DIAGNOSIS — K70.31 ALCOHOLIC CIRRHOSIS OF LIVER WITH ASCITES (H): ICD-10-CM

## 2019-05-23 LAB
ANION GAP SERPL CALCULATED.3IONS-SCNC: 7 MMOL/L (ref 3–14)
BUN SERPL-MCNC: 13 MG/DL (ref 7–30)
CALCIUM SERPL-MCNC: 8.8 MG/DL (ref 8.5–10.1)
CHLORIDE SERPL-SCNC: 110 MMOL/L (ref 94–109)
CO2 SERPL-SCNC: 20 MMOL/L (ref 20–32)
CREAT SERPL-MCNC: 1.45 MG/DL (ref 0.66–1.25)
GFR SERPL CREATININE-BSD FRML MDRD: 54 ML/MIN/{1.73_M2}
GLUCOSE SERPL-MCNC: 170 MG/DL (ref 70–99)
POTASSIUM SERPL-SCNC: 3.1 MMOL/L (ref 3.4–5.3)
SODIUM SERPL-SCNC: 137 MMOL/L (ref 133–144)

## 2019-05-23 PROCEDURE — 80048 BASIC METABOLIC PNL TOTAL CA: CPT | Performed by: PHYSICIAN ASSISTANT

## 2019-05-23 PROCEDURE — 36415 COLL VENOUS BLD VENIPUNCTURE: CPT | Performed by: PHYSICIAN ASSISTANT

## 2019-05-24 DIAGNOSIS — K72.90 DECOMPENSATION OF CIRRHOSIS OF LIVER (H): ICD-10-CM

## 2019-05-24 DIAGNOSIS — K74.60 DECOMPENSATION OF CIRRHOSIS OF LIVER (H): ICD-10-CM

## 2019-05-24 DIAGNOSIS — E87.6 HYPOKALEMIA: ICD-10-CM

## 2019-05-24 RX ORDER — POTASSIUM CHLORIDE 1500 MG/1
20 TABLET, EXTENDED RELEASE ORAL 2 TIMES DAILY
Qty: 120 TABLET | Refills: 1 | Status: ON HOLD | OUTPATIENT
Start: 2019-05-24 | End: 2019-06-04

## 2019-05-24 RX ORDER — PANTOPRAZOLE SODIUM 40 MG/1
40 TABLET, DELAYED RELEASE ORAL
Qty: 30 TABLET | Refills: 11 | Status: ON HOLD | OUTPATIENT
Start: 2019-05-24 | End: 2019-06-04

## 2019-05-24 RX ORDER — FOLIC ACID 1 MG/1
1 TABLET ORAL DAILY
Qty: 30 TABLET | Refills: 11 | Status: ON HOLD | OUTPATIENT
Start: 2019-05-24 | End: 2019-06-04

## 2019-05-24 NOTE — TELEPHONE ENCOUNTER
Health Call Center    Phone Message    May a detailed message be left on voicemail: yes    Reason for Call: Medication Refill Request    Has the patient contacted the pharmacy for the refill? Yes   Name of medication being requested:  folic acid (FOLVITE) 1 MG tablet  Provider who prescribed the medication: [facility]  Pharmacy: [address]    Date medication is needed: ASAP - patient out, pharmacy had sent last week refill request    Reason for Call: Medication Refill Request    Has the patient contacted the pharmacy for the refill? Yes   Name of medication being requested:  pantoprazole (PROTONIX) 40 MG EC tablet  Provider who prescribed the medication: [facility]  Pharmacy: [address]    Date medication is needed: ASAP - pharmacy had requested last week         Action Taken: Message routed to:  [facility] ([facility] [place]): Hepatology     Reason for Call: Medication Refill Request    Has the patient contacted the pharmacy for the refill? Yes   Name of medication being requested:  potassium chloride ER (K-DUR/KLOR-CON M) 20 MEQ CR tablet  Provider who prescribed the medication: [facility]  Pharmacy: [address]    Date medication is needed: ASAP - pharmacy had requested last week        Action Taken: Message routed to:  [facility] ([facility]): Hepatology

## 2019-05-31 ENCOUNTER — ANCILLARY PROCEDURE (OUTPATIENT)
Dept: ULTRASOUND IMAGING | Facility: CLINIC | Age: 54
End: 2019-05-31
Attending: FAMILY MEDICINE
Payer: COMMERCIAL

## 2019-05-31 ENCOUNTER — OFFICE VISIT (OUTPATIENT)
Dept: INFUSION THERAPY | Facility: CLINIC | Age: 54
End: 2019-05-31
Attending: PHYSICIAN ASSISTANT
Payer: COMMERCIAL

## 2019-05-31 VITALS
HEART RATE: 80 BPM | BODY MASS INDEX: 22.31 KG/M2 | SYSTOLIC BLOOD PRESSURE: 120 MMHG | WEIGHT: 155.5 LBS | DIASTOLIC BLOOD PRESSURE: 65 MMHG | RESPIRATION RATE: 16 BRPM | TEMPERATURE: 97.8 F | OXYGEN SATURATION: 100 %

## 2019-05-31 DIAGNOSIS — K70.31 ASCITES DUE TO ALCOHOLIC CIRRHOSIS (H): ICD-10-CM

## 2019-05-31 DIAGNOSIS — K70.31 ALCOHOLIC CIRRHOSIS OF LIVER WITH ASCITES (H): Primary | ICD-10-CM

## 2019-05-31 PROCEDURE — 25000128 H RX IP 250 OP 636: Mod: ZF | Performed by: PHYSICIAN ASSISTANT

## 2019-05-31 PROCEDURE — P9047 ALBUMIN (HUMAN), 25%, 50ML: HCPCS | Mod: ZF | Performed by: PHYSICIAN ASSISTANT

## 2019-05-31 PROCEDURE — 25000125 ZZHC RX 250: Mod: ZF | Performed by: PHYSICIAN ASSISTANT

## 2019-05-31 PROCEDURE — 27210190 US PARACENTESIS

## 2019-05-31 RX ORDER — ALBUMIN (HUMAN) 12.5 G/50ML
12.5 SOLUTION INTRAVENOUS 4 TIMES DAILY PRN
Status: DISCONTINUED | OUTPATIENT
Start: 2019-05-31 | End: 2019-05-31 | Stop reason: HOSPADM

## 2019-05-31 RX ORDER — ALBUMIN (HUMAN) 12.5 G/50ML
12.5 SOLUTION INTRAVENOUS 4 TIMES DAILY PRN
Status: CANCELLED
Start: 2019-05-31

## 2019-05-31 RX ADMIN — ALBUMIN HUMAN 12.5 G: 0.25 SOLUTION INTRAVENOUS at 10:20

## 2019-05-31 RX ADMIN — ALBUMIN HUMAN 12.5 G: 0.25 SOLUTION INTRAVENOUS at 10:27

## 2019-05-31 RX ADMIN — LIDOCAINE HYDROCHLORIDE 10 ML: 10 INJECTION, SOLUTION EPIDURAL; INFILTRATION; INTRACAUDAL; PERINEURAL at 10:21

## 2019-05-31 NOTE — PROGRESS NOTES
Paracentesis Nursing Note  Ivan Bell presents today to [facility] for a paracentesis.    During today's appointment orders from LETA DURAN were completed.    Progress Note:  Patient identification verified by name and date of birth.  Assessment completed.  Vitals monitored throughout appointment and recorded in Doc Flowsheets.  See proceduralist note in ultrasound.    Vascular Access: peripheral IV placed today.  Labs: were not ordered for this appointment.    Date of consent or authorization: 4/17/2019.  Invasive Procedure Safety Checklist was completed and sent for scanning.     Paracentesis performed by Cristopher Colvin PA-C Radiology.    The following labs were communicated to provider performing paracentesis:  Lab Results   Component Value Date    PLT 74 05/16/2019       Total amount of ascites fluid drained: 1.9 liters.  Color of ascites fluid: yellow.  Total amount of albumin given: 25  grams.    Patient tolerated procedure well.    Post procedure,denies pain or discomfort post paracentesis.      Discharge Plan:  Discharge instructions were reviewed with patient.  Patient/Representative verbalized understanding and all questions were answered.   Discharged from [facility] in stable condition.    Jolanta Ferguson RN    Administrations This Visit     lidocaine 1 % 20 mL     Admin Date  05/31/2019 Action  Given by Other Dose  10 mL Route  Other Administered By  Jolanta Ferguson RN                /66   Pulse 76   Temp 97.8  F (36.6  C) (Oral)   Resp 16   Wt 72.4 kg (159 lb 11.2 oz)   SpO2 100%   BMI 22.91 kg/m

## 2019-05-31 NOTE — PATIENT INSTRUCTIONS
DISCHARGE INSTRUCTIONS FOLLOWING ABDOMINAL PARACENTESIS    After you go home:    No strenuous activity for 24 hours    Resume your regular diet    Limit fluid intake for the first 48 hours to no more than 2 quarts per day.  There should be minimal drainage from the needle site.  If drainage does occur and soaks through the bandage, apply gentle pressure with your hand for 5 minutes.    Notify MD for the following:    Excessive drainage    Excessive swelling, redness or tenderness at the needle site    Fever greater than 101 degrees F    Dizziness or light-headedness when getting up or walking      IF THIS IS A MEDICAL EMERGENCY, CALL [phone]    If you have any questions or concerns    Contact the Hepatology Clinic:   [phone]    If you are a post-transplant patient, contact the Transplant Office:  [phone]    If this is after hours, contact the hospital :  [phone] and as to have the GI resident on call paged                   I have received and understand my discharge instructions and I have all of my personal belongings.          ------------------------------------------------------        ---------------------------------------------------    Patient / Significant Other's Signature     Relationship

## 2019-06-04 ENCOUNTER — HOSPITAL ENCOUNTER (INPATIENT)
Facility: CLINIC | Age: 54
LOS: 3 days | Discharge: HOME | End: 2019-06-07
Attending: EMERGENCY MEDICINE | Admitting: INTERNAL MEDICINE
Payer: COMMERCIAL

## 2019-06-04 ENCOUNTER — APPOINTMENT (OUTPATIENT)
Dept: GENERAL RADIOLOGY | Facility: CLINIC | Age: 54
End: 2019-06-04
Attending: EMERGENCY MEDICINE
Payer: COMMERCIAL

## 2019-06-04 ENCOUNTER — APPOINTMENT (OUTPATIENT)
Dept: ULTRASOUND IMAGING | Facility: CLINIC | Age: 54
End: 2019-06-04
Attending: PHYSICIAN ASSISTANT
Payer: COMMERCIAL

## 2019-06-04 DIAGNOSIS — K70.31 ALCOHOLIC CIRRHOSIS OF LIVER WITH ASCITES (H): ICD-10-CM

## 2019-06-04 DIAGNOSIS — K29.71 GASTRITIS WITH HEMORRHAGE, UNSPECIFIED CHRONICITY, UNSPECIFIED GASTRITIS TYPE: Primary | ICD-10-CM

## 2019-06-04 DIAGNOSIS — K74.60 DECOMPENSATION OF CIRRHOSIS OF LIVER (H): ICD-10-CM

## 2019-06-04 DIAGNOSIS — E87.6 HYPOKALEMIA: ICD-10-CM

## 2019-06-04 DIAGNOSIS — Z95.828 S/P TIPS (TRANSJUGULAR INTRAHEPATIC PORTOSYSTEMIC SHUNT): ICD-10-CM

## 2019-06-04 DIAGNOSIS — K72.90 DECOMPENSATION OF CIRRHOSIS OF LIVER (H): ICD-10-CM

## 2019-06-04 DIAGNOSIS — N25.89 RTA (RENAL TUBULAR ACIDOSIS): ICD-10-CM

## 2019-06-04 DIAGNOSIS — K76.82 HEPATIC ENCEPHALOPATHY (H): ICD-10-CM

## 2019-06-04 DIAGNOSIS — E87.6 HYPOPOTASSEMIA: ICD-10-CM

## 2019-06-04 DIAGNOSIS — R41.82 ALTERED MENTAL STATUS, UNSPECIFIED ALTERED MENTAL STATUS TYPE: ICD-10-CM

## 2019-06-04 PROBLEM — G93.40 ENCEPHALOPATHY: Status: ACTIVE | Noted: 2019-06-04

## 2019-06-04 LAB
ALBUMIN SERPL-MCNC: 3.1 G/DL (ref 3.4–5)
ALP SERPL-CCNC: 198 U/L (ref 40–150)
ALT SERPL W P-5'-P-CCNC: 17 U/L (ref 0–70)
AMMONIA PLAS-SCNC: 62 UMOL/L (ref 10–50)
ANION GAP SERPL CALCULATED.3IONS-SCNC: 8 MMOL/L (ref 3–14)
ANION GAP SERPL CALCULATED.3IONS-SCNC: 9 MMOL/L (ref 3–14)
AST SERPL W P-5'-P-CCNC: 56 U/L (ref 0–45)
BASOPHILS # BLD AUTO: 0 10E9/L (ref 0–0.2)
BASOPHILS NFR BLD AUTO: 0.2 %
BILIRUB SERPL-MCNC: 9.2 MG/DL (ref 0.2–1.3)
BUN SERPL-MCNC: 12 MG/DL (ref 7–30)
BUN SERPL-MCNC: 12 MG/DL (ref 7–30)
CALCIUM SERPL-MCNC: 9.5 MG/DL (ref 8.5–10.1)
CALCIUM SERPL-MCNC: 9.7 MG/DL (ref 8.5–10.1)
CHLORIDE SERPL-SCNC: 112 MMOL/L (ref 94–109)
CHLORIDE SERPL-SCNC: 115 MMOL/L (ref 94–109)
CO2 SERPL-SCNC: 18 MMOL/L (ref 20–32)
CO2 SERPL-SCNC: 20 MMOL/L (ref 20–32)
CREAT SERPL-MCNC: 1.37 MG/DL (ref 0.66–1.25)
CREAT SERPL-MCNC: 1.41 MG/DL (ref 0.66–1.25)
DIFFERENTIAL METHOD BLD: ABNORMAL
EOSINOPHIL # BLD AUTO: 0.3 10E9/L (ref 0–0.7)
EOSINOPHIL NFR BLD AUTO: 7.7 %
ERYTHROCYTE [DISTWIDTH] IN BLOOD BY AUTOMATED COUNT: 17.4 % (ref 10–15)
GFR SERPL CREATININE-BSD FRML MDRD: 55 ML/MIN/{1.73_M2}
GFR SERPL CREATININE-BSD FRML MDRD: 57 ML/MIN/{1.73_M2}
GLUCOSE SERPL-MCNC: 115 MG/DL (ref 70–99)
GLUCOSE SERPL-MCNC: 128 MG/DL (ref 70–99)
HCT VFR BLD AUTO: 30.2 % (ref 40–53)
HGB BLD-MCNC: 9.8 G/DL (ref 13.3–17.7)
IMM GRANULOCYTES # BLD: 0 10E9/L (ref 0–0.4)
IMM GRANULOCYTES NFR BLD: 0.5 %
INR PPP: 1.92 (ref 0.86–1.14)
LYMPHOCYTES # BLD AUTO: 0.8 10E9/L (ref 0.8–5.3)
LYMPHOCYTES NFR BLD AUTO: 17.6 %
MAGNESIUM SERPL-MCNC: 2 MG/DL (ref 1.6–2.3)
MAGNESIUM SERPL-MCNC: 2.1 MG/DL (ref 1.6–2.3)
MCH RBC QN AUTO: 30.6 PG (ref 26.5–33)
MCHC RBC AUTO-ENTMCNC: 32.5 G/DL (ref 31.5–36.5)
MCV RBC AUTO: 94 FL (ref 78–100)
MONOCYTES # BLD AUTO: 0.4 10E9/L (ref 0–1.3)
MONOCYTES NFR BLD AUTO: 8.3 %
NEUTROPHILS # BLD AUTO: 2.9 10E9/L (ref 1.6–8.3)
NEUTROPHILS NFR BLD AUTO: 65.7 %
NRBC # BLD AUTO: 0 10*3/UL
NRBC BLD AUTO-RTO: 0 /100
PHOSPHATE SERPL-MCNC: 1.2 MG/DL (ref 2.5–4.5)
PLATELET # BLD AUTO: 85 10E9/L (ref 150–450)
POTASSIUM SERPL-SCNC: 2.2 MMOL/L (ref 3.4–5.3)
POTASSIUM SERPL-SCNC: 2.5 MMOL/L (ref 3.4–5.3)
POTASSIUM SERPL-SCNC: 2.7 MMOL/L (ref 3.4–5.3)
PROT SERPL-MCNC: 6.6 G/DL (ref 6.8–8.8)
RBC # BLD AUTO: 3.2 10E12/L (ref 4.4–5.9)
SODIUM SERPL-SCNC: 139 MMOL/L (ref 133–144)
SODIUM SERPL-SCNC: 143 MMOL/L (ref 133–144)
WBC # BLD AUTO: 4.4 10E9/L (ref 4–11)

## 2019-06-04 PROCEDURE — 84132 ASSAY OF SERUM POTASSIUM: CPT | Performed by: INTERNAL MEDICINE

## 2019-06-04 PROCEDURE — 84132 ASSAY OF SERUM POTASSIUM: CPT | Performed by: PHYSICIAN ASSISTANT

## 2019-06-04 PROCEDURE — 25000132 ZZH RX MED GY IP 250 OP 250 PS 637: Performed by: EMERGENCY MEDICINE

## 2019-06-04 PROCEDURE — 36415 COLL VENOUS BLD VENIPUNCTURE: CPT | Performed by: INTERNAL MEDICINE

## 2019-06-04 PROCEDURE — 99207 ZZC APP CREDIT; MD BILLING SHARED VISIT: CPT | Performed by: PHYSICIAN ASSISTANT

## 2019-06-04 PROCEDURE — 99284 EMERGENCY DEPT VISIT MOD MDM: CPT | Mod: 25 | Performed by: EMERGENCY MEDICINE

## 2019-06-04 PROCEDURE — 76705 ECHO EXAM OF ABDOMEN: CPT | Mod: 26 | Performed by: INTERNAL MEDICINE

## 2019-06-04 PROCEDURE — 25800030 ZZH RX IP 258 OP 636: Performed by: INTERNAL MEDICINE

## 2019-06-04 PROCEDURE — 85610 PROTHROMBIN TIME: CPT | Performed by: EMERGENCY MEDICINE

## 2019-06-04 PROCEDURE — 93005 ELECTROCARDIOGRAM TRACING: CPT | Performed by: EMERGENCY MEDICINE

## 2019-06-04 PROCEDURE — 40000556 ZZH STATISTIC PERIPHERAL IV START W US GUIDANCE

## 2019-06-04 PROCEDURE — 80053 COMPREHEN METABOLIC PANEL: CPT | Performed by: EMERGENCY MEDICINE

## 2019-06-04 PROCEDURE — 96365 THER/PROPH/DIAG IV INF INIT: CPT | Performed by: EMERGENCY MEDICINE

## 2019-06-04 PROCEDURE — 36415 COLL VENOUS BLD VENIPUNCTURE: CPT | Performed by: PHYSICIAN ASSISTANT

## 2019-06-04 PROCEDURE — 99233 SBSQ HOSP IP/OBS HIGH 50: CPT | Mod: 25 | Performed by: INTERNAL MEDICINE

## 2019-06-04 PROCEDURE — 12000026 ZZH R&B TRANSPLANT

## 2019-06-04 PROCEDURE — 0W9G3ZX DRAINAGE OF PERITONEAL CAVITY, PERCUTANEOUS APPROACH, DIAGNOSTIC: ICD-10-PCS | Performed by: INTERNAL MEDICINE

## 2019-06-04 PROCEDURE — 93010 ELECTROCARDIOGRAM REPORT: CPT | Mod: Z6 | Performed by: EMERGENCY MEDICINE

## 2019-06-04 PROCEDURE — 93975 VASCULAR STUDY: CPT | Mod: TC

## 2019-06-04 PROCEDURE — 25000132 ZZH RX MED GY IP 250 OP 250 PS 637: Performed by: PHYSICIAN ASSISTANT

## 2019-06-04 PROCEDURE — 82140 ASSAY OF AMMONIA: CPT | Performed by: EMERGENCY MEDICINE

## 2019-06-04 PROCEDURE — 87040 BLOOD CULTURE FOR BACTERIA: CPT | Performed by: EMERGENCY MEDICINE

## 2019-06-04 PROCEDURE — 80048 BASIC METABOLIC PNL TOTAL CA: CPT | Performed by: PHYSICIAN ASSISTANT

## 2019-06-04 PROCEDURE — 96367 TX/PROPH/DG ADDL SEQ IV INF: CPT | Performed by: EMERGENCY MEDICINE

## 2019-06-04 PROCEDURE — 83735 ASSAY OF MAGNESIUM: CPT | Performed by: INTERNAL MEDICINE

## 2019-06-04 PROCEDURE — 83735 ASSAY OF MAGNESIUM: CPT | Performed by: EMERGENCY MEDICINE

## 2019-06-04 PROCEDURE — 25000128 H RX IP 250 OP 636: Performed by: EMERGENCY MEDICINE

## 2019-06-04 PROCEDURE — P9047 ALBUMIN (HUMAN), 25%, 50ML: HCPCS | Performed by: PHYSICIAN ASSISTANT

## 2019-06-04 PROCEDURE — 99285 EMERGENCY DEPT VISIT HI MDM: CPT | Mod: 25 | Performed by: EMERGENCY MEDICINE

## 2019-06-04 PROCEDURE — 84100 ASSAY OF PHOSPHORUS: CPT | Performed by: INTERNAL MEDICINE

## 2019-06-04 PROCEDURE — 85025 COMPLETE CBC W/AUTO DIFF WBC: CPT | Performed by: EMERGENCY MEDICINE

## 2019-06-04 PROCEDURE — 71046 X-RAY EXAM CHEST 2 VIEWS: CPT

## 2019-06-04 PROCEDURE — 74019 RADEX ABDOMEN 2 VIEWS: CPT

## 2019-06-04 PROCEDURE — 25000128 H RX IP 250 OP 636: Performed by: INTERNAL MEDICINE

## 2019-06-04 PROCEDURE — 25000125 ZZHC RX 250: Performed by: INTERNAL MEDICINE

## 2019-06-04 PROCEDURE — 25000128 H RX IP 250 OP 636: Performed by: PHYSICIAN ASSISTANT

## 2019-06-04 RX ORDER — FOLIC ACID 1 MG/1
1 TABLET ORAL DAILY
Status: DISCONTINUED | OUTPATIENT
Start: 2019-06-04 | End: 2019-06-07 | Stop reason: HOSPADM

## 2019-06-04 RX ORDER — ALBUMIN (HUMAN) 12.5 G/50ML
75 SOLUTION INTRAVENOUS DAILY
Status: DISCONTINUED | OUTPATIENT
Start: 2019-06-04 | End: 2019-06-05

## 2019-06-04 RX ORDER — POTASSIUM CHLORIDE 20MEQ/15ML
60 LIQUID (ML) ORAL ONCE
Status: COMPLETED | OUTPATIENT
Start: 2019-06-04 | End: 2019-06-04

## 2019-06-04 RX ORDER — LANOLIN ALCOHOL/MO/W.PET/CERES
100 CREAM (GRAM) TOPICAL DAILY
Status: DISCONTINUED | OUTPATIENT
Start: 2019-06-04 | End: 2019-06-07 | Stop reason: HOSPADM

## 2019-06-04 RX ORDER — MULTIVITAMIN,THERAPEUTIC
1 TABLET ORAL DAILY
Status: DISCONTINUED | OUTPATIENT
Start: 2019-06-04 | End: 2019-06-07 | Stop reason: HOSPADM

## 2019-06-04 RX ORDER — POTASSIUM CL/LIDO/0.9 % NACL 10MEQ/0.1L
10 INTRAVENOUS SOLUTION, PIGGYBACK (ML) INTRAVENOUS ONCE
Status: COMPLETED | OUTPATIENT
Start: 2019-06-04 | End: 2019-06-04

## 2019-06-04 RX ORDER — POTASSIUM CHLORIDE 7.45 MG/ML
10 INJECTION INTRAVENOUS
Status: DISCONTINUED | OUTPATIENT
Start: 2019-06-04 | End: 2019-06-07 | Stop reason: HOSPADM

## 2019-06-04 RX ORDER — SODIUM CHLORIDE, SODIUM LACTATE, POTASSIUM CHLORIDE, CALCIUM CHLORIDE 600; 310; 30; 20 MG/100ML; MG/100ML; MG/100ML; MG/100ML
INJECTION, SOLUTION INTRAVENOUS CONTINUOUS
Status: DISCONTINUED | OUTPATIENT
Start: 2019-06-04 | End: 2019-06-04

## 2019-06-04 RX ORDER — SODIUM CHLORIDE 9 MG/ML
INJECTION, SOLUTION INTRAVENOUS CONTINUOUS
Status: DISCONTINUED | OUTPATIENT
Start: 2019-06-04 | End: 2019-06-04

## 2019-06-04 RX ORDER — LACTULOSE 10 G/15ML
20 SOLUTION ORAL
Status: DISCONTINUED | OUTPATIENT
Start: 2019-06-04 | End: 2019-06-07 | Stop reason: HOSPADM

## 2019-06-04 RX ORDER — MAGNESIUM SULFATE HEPTAHYDRATE 40 MG/ML
4 INJECTION, SOLUTION INTRAVENOUS EVERY 4 HOURS PRN
Status: DISCONTINUED | OUTPATIENT
Start: 2019-06-04 | End: 2019-06-07 | Stop reason: HOSPADM

## 2019-06-04 RX ORDER — POTASSIUM CHLORIDE 29.8 MG/ML
20 INJECTION INTRAVENOUS
Status: DISCONTINUED | OUTPATIENT
Start: 2019-06-04 | End: 2019-06-07 | Stop reason: HOSPADM

## 2019-06-04 RX ORDER — POTASSIUM CL/LIDO/0.9 % NACL 10MEQ/0.1L
10 INTRAVENOUS SOLUTION, PIGGYBACK (ML) INTRAVENOUS
Status: DISCONTINUED | OUTPATIENT
Start: 2019-06-04 | End: 2019-06-07 | Stop reason: HOSPADM

## 2019-06-04 RX ORDER — CHOLECALCIFEROL (VITAMIN D3) 50 MCG
1 TABLET ORAL DAILY
COMMUNITY

## 2019-06-04 RX ORDER — LACTULOSE 10 G/15ML
100 SOLUTION ORAL
Status: DISCONTINUED | OUTPATIENT
Start: 2019-06-04 | End: 2019-06-07 | Stop reason: HOSPADM

## 2019-06-04 RX ORDER — PANTOPRAZOLE SODIUM 40 MG/1
40 TABLET, DELAYED RELEASE ORAL
Status: DISCONTINUED | OUTPATIENT
Start: 2019-06-05 | End: 2019-06-05

## 2019-06-04 RX ORDER — POTASSIUM CHLORIDE 1.5 G/1.58G
20-40 POWDER, FOR SOLUTION ORAL
Status: DISCONTINUED | OUTPATIENT
Start: 2019-06-04 | End: 2019-06-07 | Stop reason: HOSPADM

## 2019-06-04 RX ORDER — POTASSIUM CHLORIDE 750 MG/1
20-40 TABLET, EXTENDED RELEASE ORAL
Status: DISCONTINUED | OUTPATIENT
Start: 2019-06-04 | End: 2019-06-07 | Stop reason: HOSPADM

## 2019-06-04 RX ORDER — NALOXONE HYDROCHLORIDE 0.4 MG/ML
.1-.4 INJECTION, SOLUTION INTRAMUSCULAR; INTRAVENOUS; SUBCUTANEOUS
Status: DISCONTINUED | OUTPATIENT
Start: 2019-06-04 | End: 2019-06-07 | Stop reason: HOSPADM

## 2019-06-04 RX ORDER — MAGNESIUM SULFATE HEPTAHYDRATE 40 MG/ML
4 INJECTION, SOLUTION INTRAVENOUS EVERY 4 HOURS PRN
Status: DISCONTINUED | OUTPATIENT
Start: 2019-06-04 | End: 2019-06-04

## 2019-06-04 RX ADMIN — RIFAXIMIN 550 MG: 550 TABLET ORAL at 19:41

## 2019-06-04 RX ADMIN — LACTULOSE 20 G: 20 SOLUTION ORAL at 21:57

## 2019-06-04 RX ADMIN — POTASSIUM CHLORIDE 10 MEQ: 10 INJECTION, SOLUTION INTRAVENOUS at 20:20

## 2019-06-04 RX ADMIN — ALBUMIN HUMAN 75 G: 0.25 SOLUTION INTRAVENOUS at 14:29

## 2019-06-04 RX ADMIN — POTASSIUM CHLORIDE: 2 INJECTION, SOLUTION, CONCENTRATE INTRAVENOUS at 18:15

## 2019-06-04 RX ADMIN — LACTULOSE 20 G: 20 SOLUTION ORAL at 19:48

## 2019-06-04 RX ADMIN — POTASSIUM CHLORIDE 10 MEQ: 10 INJECTION, SOLUTION INTRAVENOUS at 16:46

## 2019-06-04 RX ADMIN — POTASSIUM CHLORIDE 10 MEQ: 10 INJECTION, SOLUTION INTRAVENOUS at 18:14

## 2019-06-04 RX ADMIN — LACTULOSE 20 G: 20 SOLUTION ORAL at 23:57

## 2019-06-04 RX ADMIN — Medication 10 MEQ: at 09:19

## 2019-06-04 RX ADMIN — FOLIC ACID 1 MG: 1 TABLET ORAL at 11:55

## 2019-06-04 RX ADMIN — Medication 10 MEQ: at 08:26

## 2019-06-04 RX ADMIN — RIFAXIMIN 550 MG: 550 TABLET ORAL at 11:54

## 2019-06-04 RX ADMIN — THERA TABS 1 TABLET: TAB at 11:54

## 2019-06-04 RX ADMIN — LACTULOSE 20 G: 20 SOLUTION ORAL at 14:10

## 2019-06-04 RX ADMIN — POTASSIUM CHLORIDE 60 MEQ: 20 SOLUTION ORAL at 08:29

## 2019-06-04 RX ADMIN — POTASSIUM CHLORIDE 10 MEQ: 10 INJECTION, SOLUTION INTRAVENOUS at 22:27

## 2019-06-04 RX ADMIN — Medication 100 MG: at 11:54

## 2019-06-04 RX ADMIN — POTASSIUM PHOSPHATE, MONOBASIC AND POTASSIUM PHOSPHATE, DIBASIC 20 MMOL: 224; 236 INJECTION, SOLUTION INTRAVENOUS at 23:46

## 2019-06-04 RX ADMIN — POTASSIUM CHLORIDE 10 MEQ: 10 INJECTION, SOLUTION INTRAVENOUS at 21:20

## 2019-06-04 RX ADMIN — POTASSIUM CHLORIDE 10 MEQ: 10 INJECTION, SOLUTION INTRAVENOUS at 19:23

## 2019-06-04 ASSESSMENT — ENCOUNTER SYMPTOMS
FEVER: 0
NAUSEA: 1
DIARRHEA: 0
NUMBNESS: 0
HEADACHES: 0
CHILLS: 0
DIZZINESS: 0
FATIGUE: 1
COUGH: 0
CONSTIPATION: 1
VOMITING: 0
SHORTNESS OF BREATH: 0
LIGHT-HEADEDNESS: 0

## 2019-06-04 ASSESSMENT — ACTIVITIES OF DAILY LIVING (ADL)
ADLS_ACUITY_SCORE: 13

## 2019-06-04 ASSESSMENT — MIFFLIN-ST. JEOR: SCORE: 1530.72

## 2019-06-04 NOTE — CONSULTS
Nephrology Initial Consult  June 4, 2019      Ivan Bell MRN:[id number] YOB: 1965  Date of Admission:6/4/2019  Primary care provider: Rosette Wills  Requesting physician: Marlee Guerra MD    ASSESSMENT AND RECOMMENDATIONS:   *52 yo M with hx of alcohol cirrhosis, c/b ascites s/p TIPS but still requiring paracentersis, hx of HE and esophageal varices, recent AMBER and Typ2 II RTA, admitted for AMS found to have hypokalemia and mildly low bicarb.     #Hypokalemia  Patient was seen by nephrology last admission where RTA type 2 (proximal) was diagnosed due to high urine potassium excretion. Urine anion gap was not checked at that time. He now has hypokalemia and mildly low bicarb at 18. This could be caused by RTA but could also be due to GI losses. Since he is on lactulose, will need to get urine anion gap to differentiate.   - Replace potassium as you are  - Increase spironolactone to 100mg/day (is only taking 50 at home despite bring prescribed 100)   - We will follow up urine studies- U Cl, U K, Tereso, and UA for anion gap, and follow up with further recs based on the results.       Recommendations were communicated to primary team. We will continue to follow with you.     Seen and discussed with Dr. Opal Arcos MD   [phone]      REASON FOR CONSULT: management of hypokalemia possible RTA    HISTORY OF PRESENT ILLNESS:  Admitting provider and nursing notes reviewed  Ivan Bell is a 53 year old male with history of alcoholic cirrhosis with ascites s/p tips however still requiring paracentesis, esophageal varices, and hepatic encephalopathy, was admitted due to hepatic encephalopathy and found to have hypokalemia.     He was supposed to be taking a 100mg spironolactone but only taking 50mg at home, and he was not discharged with potassium supplements. However, per discharge summary he was discharged oin 60 mEq K BID, and 1300 Na HCO3- TID. These were not on his home med  list on admission, however.     At last admission he was seen by nephrology and diagnosed with an RTA due to high potassium excretion in the urine. Creatinine has been otherwise at baseline.     Patient had some abdominal pain but not any more. He was told he was confused by his wife and brought in for this reason. Pt states he has been having 3 BMs daily and has not skipped any lactulose recently. He says he already had 3 loose BMs today. He denies SOB, leg swelling.     PAST MEDICAL HISTORY:  Reviewed with patient on 06/04/2019     Past Medical History:   Diagnosis Date     Ascites      Cirrhosis (H)      Esophageal varices (H)      Hepatitis      Hypertension      Left calcaneus fracture 1/9/2006 January 16, 2006: Fell 10 feet from ladder onto left foot on frozen ground on 1/9/06 at home.  Immediate pain and unable to walk- seen at [place] and diagnosed with calcaneus fracture     Portal vein thrombosis     left occlusion, partial main       Past Surgical History:   Procedure Laterality Date     ANKLE SURGERY Left      COLONOSCOPY N/A 3/31/2016    Procedure: COLONOSCOPY;  Surgeon: Rhys Uriostegui MD;  Location:  GI     ESOPHAGOSCOPY, GASTROSCOPY, DUODENOSCOPY (EGD), COMBINED N/A 3/31/2016    Procedure: COMBINED ESOPHAGOSCOPY, GASTROSCOPY, DUODENOSCOPY (EGD);  Surgeon: Rhys Uriostegui MD;  Location:  GI     ESOPHAGOSCOPY, GASTROSCOPY, DUODENOSCOPY (EGD), COMBINED N/A 3/9/2018    Procedure: COMBINED ESOPHAGOSCOPY, GASTROSCOPY, DUODENOSCOPY (EGD), BIOPSY SINGLE OR MULTIPLE;  EGD;  Surgeon: Gonzalo Wahl MD;  Location:  GI     KNEE SURGERY Left      KNEE SURGERY Right      SIGMOIDOSCOPY FLEXIBLE N/A 10/31/2017    Procedure: SIGMOIDOSCOPY FLEXIBLE;;  Surgeon: Armaan Adams MD;  Location:  GI     TIPS Procedure  06/06/2018        MEDICATIONS:  PTA Meds  Prior to Admission medications    Medication Sig Last Dose Taking? Auth Provider   CONSTULOSE 10 GM/15ML solution TAKE  30MLS BY MOUTH THREE TIMES DAILY AS NEEDED FOR CONSTIPATION ( TAKE AS NEEDED TO MAINTAIN 3 TO 4 BOWEL MOVEMENTS DAILY) 6/4/2019 at Unknown time Yes Gonzalo Wahl MD   furosemide (LASIX) 20 MG tablet Take 1 tablet (20 mg) by mouth daily 6/3/2019 at Unknown time Yes Celestino Verduzco PA-C   MULTIPLE VITAMINS PO Take 1 tablet by mouth daily 6/3/2019 at Unknown time Yes Reported, Patient   rifaximin (XIFAXAN) 550 MG TABS tablet Take 1 tablet (550 mg) by mouth 2 times daily 6/3/2019 at Unknown time Yes Rosette Wills MD   vitamin B1 (THIAMINE) 100 MG tablet Take 1 tablet (100 mg) by mouth daily 6/3/2019 at Unknown time Yes Leonarda Cabrera MD   vitamin D3 (CHOLECALCIFEROL) 2000 units (50 mcg) tablet Take 1 tablet by mouth daily 6/3/2019 at Unknown time Yes Unknown, Entered By History   spironolactone (ALDACTONE) 50 MG tablet Take 2 tablets (100 mg) by mouth daily  Patient taking differently: Take 50 mg by mouth daily    Celestino Verduzco PA-C      Current Meds    albumin human  75 g Intravenous Daily     folic acid  1 mg Oral Daily     multivitamin, therapeutic  1 tablet Oral Daily     [START ON 6/5/2019] pantoprazole  40 mg Oral QAM AC     rifaximin  550 mg Oral BID     vitamin B1  100 mg Oral Daily     Infusion Meds    - MEDICATION INSTRUCTIONS -       IV infusion builder WITH additives         ALLERGIES:    Allergies   Allergen Reactions     Prednisone Visual Disturbance     Trazodone Visual Disturbance     Benadryl [Diphenhydramine] Other (See Comments)     Delirium (visual and auditory hallucinations)     Oxycodone Other (See Comments)     Delirium and constipation       REVIEW OF SYSTEMS:  A comprehensive of systems was negative except as noted above.    SOCIAL HISTORY:   Social History     Socioeconomic History     Marital status:      Spouse name: Not on file     Number of children: Not on file     Years of education: Not on file     Highest education level: Not on file    Occupational History     Not on file   Social Needs     Financial resource strain: Not on file     Food insecurity:     Worry: Not on file     Inability: Not on file     Transportation needs:     Medical: Not on file     Non-medical: Not on file   Tobacco Use     Smoking status: Former Smoker     Types: Dip, chew, snus or snuff     Last attempt to quit: 1998     Years since quittin.7     Smokeless tobacco: Current User     Last attempt to quit: 10/24/2017     Tobacco comment: 1 tin per 10 days.   Substance and Sexual Activity     Alcohol use: No     Alcohol/week: 10.5 oz     Types: 21 Cans of beer per week     Comment: last etoh 16, did have Odouls ~2017     Drug use: No     Sexual activity: Not on file   Lifestyle     Physical activity:     Days per week: Not on file     Minutes per session: Not on file     Stress: Not on file   Relationships     Social connections:     Talks on phone: Not on file     Gets together: Not on file     Attends Muslim service: Not on file     Active member of club or organization: Not on file     Attends meetings of clubs or organizations: Not on file     Relationship status: Not on file     Intimate partner violence:     Fear of current or ex partner: Not on file     Emotionally abused: Not on file     Physically abused: Not on file     Forced sexual activity: Not on file   Other Topics Concern     Parent/sibling w/ CABG, MI or angioplasty before 65F 55M? Not Asked   Social History Narrative     Not on file       FAMILY MEDICAL HISTORY:   Family History   Problem Relation Age of Onset     Family History Negative Mother      Family History Negative Father      Hypertension Father      Cerebrovascular Disease Father 87     Breast Cancer Maternal Grandmother      Rheumatoid Arthritis Daughter      Depression Daughter      Cancer - colorectal No family hx of      Prostate Cancer No family hx of      Liver Disease No family hx of        PHYSICAL EXAM:   Temp  Av  " F (36.7  C)  Min: 97.8  F (36.6  C)  Max: 98.1  F (36.7  C)      Pulse  Av.8  Min: 80  Max: 98 Resp  Av  Min: 16  Max: 16  SpO2  Av.9 %  Min: 99 %  Max: 100 %       /85 (BP Location: Right arm)   Pulse 80   Temp 97.8  F (36.6  C) (Oral)   Resp 16   Ht 1.778 m (5' 10\")   Wt 68.9 kg (152 lb)   SpO2 100%   BMI 21.81 kg/m        Admit Weight: 68.9 kg (152 lb)     GENERAL APPEARANCE: awake, alert, in NAD.   EYES: ++ scleral icterus, pupils equal  Lymphatics: no cervical or supraclavicular LAD  Pulmonary: bibasilar crackles, no distress on RA.   CV: regular rhythm, normal rate, SEJM 3/6, no LE edema.   GI: soft, mildly distended, non-tender, +HM.   MS: no evidence of inflammation in joints, no muscle tenderness  : no kenney  SKIN: no rash, warm, dry, no cyanosis  NEURO: face symmetric, + asterixis     LABS:   CMP  Recent Labs   Lab 19  1508 19  0716    139   POTASSIUM 2.7* 2.2*   CHLORIDE 115* 112*   CO2 20 18*   ANIONGAP 8 9   * 115*   BUN 12 12   CR 1.41* 1.37*   GFRESTIMATED 55* 57*   GFRESTBLACK 64 66   JULISSA 9.5 9.7   MAG  --  2.0   PROTTOTAL  --  6.6*   ALBUMIN  --  3.1*   BILITOTAL  --  9.2*   ALKPHOS  --  198*   AST  --  56*   ALT  --  17     CBC  Recent Labs   Lab 19  0716   HGB 9.8*   WBC 4.4   RBC 3.20*   HCT 30.2*   MCV 94   MCH 30.6   MCHC 32.5   RDW 17.4*   PLT 85*     INR  Recent Labs   Lab 19  0716   INR 1.92*     ABGNo lab results found in last 7 days.   URINE STUDIES  Recent Labs   Lab Test 19  1300 19  1040 18  0017  13  0918 12  1642 11  0830   COLOR Dark Yellow Dark Yellow Yellow Dark Yellow   < > Yellow Yellow Yellow   APPEARANCE Slightly Cloudy Clear Clear Slightly Cloudy   < > Clear Clear Clear   URINEGLC Negative Negative Negative Negative   < > Negative Negative Negative   URINEBILI Large* Large* Negative Large   This is an unconfirmed screening test result. A positive result may be " false.  *   < > Negative Negative Small*   URINEKETONE Negative Negative Negative Negative   < > Negative 15* Negative   SG 1.009 1.007 1.008 1.010   < > 1.020 1.015 1.020   UBLD Small* Moderate* Negative Negative   < > Negative Trace* Negative   URINEPH 6.5 7.5* 7.5* 5.5   < > 6.0 6.0 6.0   PROTEIN Negative Negative Negative Negative   < > Negative Negative Trace*   UROBILINOGEN  --   --   --   --   --  1.0 0.2 2.0*   NITRITE Negative Negative Negative Negative   < > Negative Negative Negative CORRECTED ON 03/30 AT 0928: PREVIOUSLY REPORTED AS Positive   LEUKEST Small* Small* Small* Negative   < > Trace* Negative Negative   RBCU 3* 164* 1 0   < > O - 2 O - 2 O - 2   WBCU 5 13* 13* <1   < > O - 2 O - 2 O - 2    < > = values in this interval not displayed.     No lab results found.  PTH  No lab results found.  IRON STUDIES  Recent Labs   Lab Test 02/25/16  1630   IRON 51   *   IRONSAT 28   JOAQUIN 1,306*       IMAGING:  none    Carlos Arcos MD

## 2019-06-04 NOTE — ED PROVIDER NOTES
"  History     Chief Complaint   Patient presents with     Altered Mental Status     HPI  Ivan Bell is a 53 year old male with hx of liver cirrhosis who presents to the ER for increased dizziness and confusion.  Pt says that he had a paracentesis on Friday, 4 days prior, and was feeling okay the first 2 days afterwards.  Since yesterday he has been having worse dizziness and some abdominal cramps.  Patient says that he feels like he needs to pass some gas or poop.  Per wife patient did not take his lactulose all day yesterday.  When she returned from work she noticed that he was confused and acting abnormal.  She ended up giving him 3 doses of lactulose from 7 PM to this morning.  She is unsure whether he had a bowel movement or not.  Says that during the night he was not sleeping and was moaning and breathing heavy.  Denies any fever.  Patient notes some mild nausea but denies any vomiting.  Denies any difficulty breathing.  Denies any chest pain.  Patient's wife says he has been admitted once in the past for hallucinations due to elevated ammonia levels.  Currently patient is sleepy and mildly confused otherwise able to answer questions.  Patient denies any recent alcohol use.    I have reviewed the Medications, Allergies, Past Medical and Surgical History, and Social History in the Epic system.    Review of Systems   Constitutional: Positive for fatigue. Negative for chills and fever.   Respiratory: Negative for cough and shortness of breath.    Gastrointestinal: Positive for constipation and nausea. Negative for diarrhea and vomiting.   Neurological: Negative for dizziness, light-headedness, numbness and headaches.   All other systems reviewed and are negative.      Physical Exam   BP: 150/80  Pulse: 98  Temp: 98.1  F (36.7  C)  Resp: 16  Height: 177.8 cm (5' 10\")  Weight: 68.9 kg (152 lb)  SpO2: 100 %      Physical Exam   Constitutional: He appears well-developed and well-nourished.   HENT:   Head: " Normocephalic and atraumatic.   Eyes: Scleral icterus is present.   Neck: Normal range of motion. Neck supple.   Cardiovascular: Normal rate, regular rhythm and normal heart sounds.   Pulmonary/Chest: Effort normal. No respiratory distress. He has no wheezes.   Abdominal: Soft. He exhibits no distension and no mass. There is no tenderness. There is no rebound and no guarding.   Musculoskeletal: He exhibits no edema.   + asterixis    Neurological:   Mildly confused but able to answer questions; mild word finding difficulty    Skin: Skin is warm.   Psychiatric: He has a normal mood and affect. His behavior is normal. Thought content normal.       ED Course        Procedures             EKG Interpretation:      Interpreted by Gertrude Crowley  Time reviewed: 807am  Symptoms at time of EKG: None   Rhythm: normal sinus   Rate: Normal  Axis: Left Axis Deviation  Ectopy: none  Conduction: normal  ST Segments/ T Waves: No ST-T wave changes and No acute ischemic changes  Q Waves: III and aVf  Comparison to prior: Unchanged    Clinical Impression: no acute changes                Critical Care time:  none             Labs Ordered and Resulted from Time of ED Arrival Up to the Time of Departure from the ED   CBC WITH PLATELETS DIFFERENTIAL - Abnormal; Notable for the following components:       Result Value    RBC Count 3.20 (*)     Hemoglobin 9.8 (*)     Hematocrit 30.2 (*)     RDW 17.4 (*)     Platelet Count 85 (*)     All other components within normal limits   COMPREHENSIVE METABOLIC PANEL   INR   AMMONIA   ROUTINE UA WITH MICROSCOPIC   CARDIAC CONTINUOUS MONITORING   BLOOD CULTURE   BLOOD CULTURE     Results for orders placed or performed during the hospital encounter of 06/04/19   XR Chest 2 Views    Narrative    XR CHEST 2 VW, 6/4/2019 8:58 AM.    Comparison: 3/14/2019.    History: SOB.    Technique: PA and lateral chest radiographs    Findings:   Low lung volumes. Cardiomediastinal silhouette is within  normal  limits. Pulmonary vasculature is distinct. No acute airspace  opacities. No pleural effusion. No pneumothorax. No acute  intra-abdominal abnormalities. Postsurgical changes of TIPS procedure.      Impression    Impression:   No acute cardiopulmonary disease.    I have personally reviewed the examination and initial interpretation  and I agree with the findings.    SHEEBA MONTANA MD   XR Abdomen 2 Views    Narrative    EXAM: ABDOMEN X-ray 6/4/2019 8:54 AM     HISTORY:  Abdominal cramping       COMPARISON:  CT abdomen 5/18/2018    TECHNIQUE:  Upright and supine abdominal radiographs.    FINDINGS:   TIPS shunt in the liver.    Nonobstructive bowel gas pattern. Stool seen throughout the large  bowel. No portal venous gas, pneumatosis or free air in the abdomen.  Multiple bilateral renal collecting system stones.    The included osseous structures and soft tissues are within normal  limits.     The lung bases are clear.      Impression    IMPRESSION:   1. Nonobstructive bowel gas pattern. Moderate colonic stool burden.  2. Multiple bilateral renal collecting system stones.    I have personally reviewed the examination and initial interpretation  and I agree with the findings.    HERNANDEZ SERVIN MD   CBC with platelets differential   Result Value Ref Range    WBC 4.4 4.0 - 11.0 10e9/L    RBC Count 3.20 (L) 4.4 - 5.9 10e12/L    Hemoglobin 9.8 (L) 13.3 - 17.7 g/dL    Hematocrit 30.2 (L) 40.0 - 53.0 %    MCV 94 78 - 100 fl    MCH 30.6 26.5 - 33.0 pg    MCHC 32.5 31.5 - 36.5 g/dL    RDW 17.4 (H) 10.0 - 15.0 %    Platelet Count 85 (L) 150 - 450 10e9/L    Diff Method Automated Method     % Neutrophils 65.7 %    % Lymphocytes 17.6 %    % Monocytes 8.3 %    % Eosinophils 7.7 %    % Basophils 0.2 %    % Immature Granulocytes 0.5 %    Nucleated RBCs 0 0 /100    Absolute Neutrophil 2.9 1.6 - 8.3 10e9/L    Absolute Lymphocytes 0.8 0.8 - 5.3 10e9/L    Absolute Monocytes 0.4 0.0 - 1.3 10e9/L    Absolute Eosinophils 0.3 0.0 -  0.7 10e9/L    Absolute Basophils 0.0 0.0 - 0.2 10e9/L    Abs Immature Granulocytes 0.0 0 - 0.4 10e9/L    Absolute Nucleated RBC 0.0    Comprehensive metabolic panel   Result Value Ref Range    Sodium 139 133 - 144 mmol/L    Potassium 2.2 (LL) 3.4 - 5.3 mmol/L    Chloride 112 (H) 94 - 109 mmol/L    Carbon Dioxide 18 (L) 20 - 32 mmol/L    Anion Gap 9 3 - 14 mmol/L    Glucose 115 (H) 70 - 99 mg/dL    Urea Nitrogen 12 7 - 30 mg/dL    Creatinine 1.37 (H) 0.66 - 1.25 mg/dL    GFR Estimate 57 (L) >60 mL/min/[1.73_m2]    GFR Estimate If Black 66 >60 mL/min/[1.73_m2]    Calcium 9.7 8.5 - 10.1 mg/dL    Bilirubin Total 9.2 (H) 0.2 - 1.3 mg/dL    Albumin 3.1 (L) 3.4 - 5.0 g/dL    Protein Total 6.6 (L) 6.8 - 8.8 g/dL    Alkaline Phosphatase 198 (H) 40 - 150 U/L    ALT 17 0 - 70 U/L    AST 56 (H) 0 - 45 U/L   INR   Result Value Ref Range    INR 1.92 (H) 0.86 - 1.14   Ammonia (on ice)   Result Value Ref Range    Ammonia 62 (H) 10 - 50 umol/L   Magnesium   Result Value Ref Range    Magnesium 2.0 1.6 - 2.3 mg/dL   EKG 12-lead, tracing only   Result Value Ref Range    Interpretation ECG Click View Image link to view waveform and result    Blood culture   Result Value Ref Range    Specimen Description Blood Left Arm     Special Requests Received in aerobic bottle only     Culture Micro PENDING      Medications   folic acid (FOLVITE) tablet 1 mg (1 mg Oral Given 6/4/19 1155)   multivitamin, therapeutic (THERA-VIT) tablet 1 tablet (1 tablet Oral Given 6/4/19 1154)   pantoprazole (PROTONIX) EC tablet 40 mg (has no administration in time range)   rifaximin (XIFAXAN) tablet 550 mg (550 mg Oral Given 6/4/19 1154)   vitamin B1 (THIAMINE) tablet 100 mg (100 mg Oral Given 6/4/19 1154)   Daily 2 GRAM acetaminophen limit, unless fulminent liver failure or transaminases greater than or equal to 300 - 400, then none (has no administration in time range)   lactulose (CHRONULAC) solution 20 g (has no administration in time range)     Or   lactulose  (CHRONULAC) solution for enema prep 100 g (has no administration in time range)   potassium chloride ER (K-DUR/KLOR-CON M) CR tablet 20-40 mEq (has no administration in time range)   potassium chloride (KLOR-CON) Packet 20-40 mEq (has no administration in time range)   potassium chloride 10 mEq in 100 mL sterile water intermittent infusion (premix) (has no administration in time range)   potassium chloride 10 mEq in 100 mL intermittent infusion with 10 mg lidocaine (has no administration in time range)   potassium chloride 20 mEq in 50 mL intermittent infusion (has no administration in time range)   magnesium sulfate 4 g in 100 mL sterile water (premade) (has no administration in time range)   albumin human 25 % injection 75 g (has no administration in time range)   naloxone (NARCAN) injection 0.1-0.4 mg (has no administration in time range)   melatonin tablet 1 mg (has no administration in time range)   lactated ringers infusion (has no administration in time range)   potassium chloride (KAYCIEL) solution 60 mEq (60 mEq Oral Given 6/4/19 0829)   potassium chloride 10 mEq in 100 mL intermittent infusion with 10 mg lidocaine (0 mEq Intravenous ED Infusing on Admission/transfer 6/4/19 1029)   potassium chloride 10 mEq in 100 mL intermittent infusion with 10 mg lidocaine (0 mEq Intravenous Stopped 6/4/19 0918)            Assessments & Plan (with Medical Decision Making)   Patient is a [age]-year-old male with a history of liver disease who comes to the ER due to increased confusion for the past 2 days.  Patient's been taking lactulose last night but has not had a bowel movement.  Patient missed prior doses before this.  Patient is mildly sleepy on exam but is able to follow commands and give history.  We obtain labs that show the patient is severely hypokalemic with a potassium of 2.2.  Patient had to IV riders ordered as well as 60 mEq oral potassium given.  Patient will be admitted to internal medicine for further  care.  Patient and family both agree with plan of care.    I have reviewed the nursing notes.    I have reviewed the findings, diagnosis, plan and need for follow up with the patient.       Medication List      There are no discharge medications for this visit.         Final diagnoses:   Altered mental status, unspecified altered mental status type   Hepatic encephalopathy (H)   Hypokalemia       6/4/2019   [facility], [place], EMERGENCY DEPARTMENT     Gertrude Crowley MD  06/04/19 [id number]

## 2019-06-04 NOTE — ED NOTES
[facility] [place]   ED Nurse to Floor Handoff     Ivan Bell is a 53 year old male who speaks English and lives with a spouse,  in a home  They arrived in the ED by ambulance from home    ED Chief Complaint: Altered Mental Status    ED Dx;   Final diagnoses:   Altered mental status, unspecified altered mental status type   Hepatic encephalopathy (H)   Hypokalemia         Needed?: No    Allergies:   Allergies   Allergen Reactions     Benadryl [Diphenhydramine] Other (See Comments)     Delirium (visual and auditory hallucinations)     Oxycodone Other (See Comments)     Delirium and constipation   .  Past Medical Hx:   Past Medical History:   Diagnosis Date     Ascites      Cirrhosis (H)      Esophageal varices (H)      Hepatitis      Hypertension      Left calcaneus fracture 1/9/2006 January 16, 2006: Fell 10 feet from ladder onto left foot on frozen ground on 1/9/06 at home.  Immediate pain and unable to walk- seen at [place] and diagnosed with calcaneus fracture     Portal vein thrombosis     left occlusion, partial main      Baseline Mental status: WDL  Current Mental Status changes: other Patient now experiencing mild confusion; ammonia level is elevated    Infection present or suspected this encounter: no  Sepsis suspected: No  Isolation type: No active isolations     Activity level - Baseline/Home:  Independent  Activity Level - Current:   Stand with Assist    Bariatric equipment needed?: No    In the ED these meds were given:   Medications   potassium chloride 10 mEq in 100 mL intermittent infusion with 10 mg lidocaine (has no administration in time range)   potassium chloride 10 mEq in 100 mL intermittent infusion with 10 mg lidocaine (10 mEq Intravenous New Bag 6/4/19 5382)   potassium chloride (KAYCIEL) solution 60 mEq (60 mEq Oral Given 6/4/19 6204)       Drips running?  Yes    Home pump  No    Current LDAs  Wound 10/31/17 Left;Inner;Lower Thigh Reddened with  "petechia (Active)   Number of days: 581       Wound 10/31/17 Coccyx small hole over coccyx area (Active)   Number of days: 581       Incision/Surgical Site 05/14/18 Right Neck (Active)   Number of days: 386       Incision/Surgical Site 06/06/18 Right Neck (Active)   Number of days: 363       Incision/Surgical Site 06/06/18 Right Chest (Active)   Number of days: 363       Labs results:   Labs Ordered and Resulted from Time of ED Arrival Up to the Time of Departure from the ED   CBC WITH PLATELETS DIFFERENTIAL - Abnormal; Notable for the following components:       Result Value    RBC Count 3.20 (*)     Hemoglobin 9.8 (*)     Hematocrit 30.2 (*)     RDW 17.4 (*)     Platelet Count 85 (*)     All other components within normal limits   COMPREHENSIVE METABOLIC PANEL - Abnormal; Notable for the following components:    Potassium 2.2 (*)     Chloride 112 (*)     Carbon Dioxide 18 (*)     Glucose 115 (*)     Creatinine 1.37 (*)     GFR Estimate 57 (*)     Bilirubin Total 9.2 (*)     Albumin 3.1 (*)     Protein Total 6.6 (*)     Alkaline Phosphatase 198 (*)     AST 56 (*)     All other components within normal limits   INR - Abnormal; Notable for the following components:    INR 1.92 (*)     All other components within normal limits   AMMONIA - Abnormal; Notable for the following components:    Ammonia 62 (*)     All other components within normal limits   ROUTINE UA WITH MICROSCOPIC   CARDIAC CONTINUOUS MONITORING   BLOOD CULTURE   BLOOD CULTURE       Imaging Studies: No results found for this or any previous visit (from the past 24 hour(s)).    Recent vital signs:   /80   Pulse 98   Temp 98.1  F (36.7  C) (Oral)   Resp 16   Ht 1.778 m (5' 10\")   Wt 68.9 kg (152 lb)   SpO2 100%   BMI 21.81 kg/m      [place] Coma Scale Score: 14 (06/04/19 0716)       Cardiac Rhythm: Normal Sinus  Pt needs tele? Yes  Skin/wound Issues: None    Code Status: Full Code    Pain control: pt had none    Nausea control: " good    Abnormal labs/tests/findings requiring intervention: Potassium of 2.2; now getting IV and oral replacement    Family present during ED course? Yes   Family Comments/Social Situation comments: Patient lives at home with wife and two dogs; continues to work as a  when he is feeling well     Tasks needing completion: second dose of IV potassium if not completed in the ED.     NICHOLAS CALI, RN  [phone]

## 2019-06-04 NOTE — PHARMACY-ADMISSION MEDICATION HISTORY
Admission medication history interview status for the 6/4/2019 admission is complete. See Epic admission navigator for allergy information, pharmacy, prior to admission medications and immunization status.     Medication history interview sources:  Patient, spouse, and medication dispense report.     Changes made to PTA medication list (reason)  Added:   Vitamin D3 2000 unit tablet once daily by mouth.   Deleted:   Folic Acid 1 mg tablet taken once daily by mouth   Lactulose 20 gram packet taken 4 times daily  Pantoprazole 40 mg EC tablet taken once daily in the morning  Potassium chloride 20 MEQ CR tablet taken 2 times daily.   Changed:   Spironolactone was changed from taking two 50 mg tablets once daily, to taking one 50 mg tablet once daily.     Additional medication history information (including reliability of information, actions taken by pharmacist):    -Patient was not a reliable historian of his medications, but his spouse was helpful and appeared to have a current list of his  Medications on her phone.  -Patient mentioned that he fills his medications at [facility] in [place], which matches his dispense records.    -Folic acid was filled on 5/24/19 (30 tabs) per dispense records, but patient and spouse said the patient is not currently taking this medication.   -Potassium chloride  was filled on 5/24/19 (120 tabs)  per dispense records, but patient and spouse said it is not currently being taken.  They also mentioned that he stopped taking this about 2 weeks ago.   -Pantoprazole was filled 5/24/19 (30 tabs) per dispense records, but patient was unsure and said he was not taking this medication any longer.  -Lactulose 20 gram packet is no longer being taken, as the patient is using Contulose solution as needed. The patient uses this medication most days of the week.        Prior to Admission medications    Medication Sig Last Dose Taking? Auth Provider   CONSTULOSE 10 GM/15ML solution TAKE 30MLS  BY MOUTH THREE TIMES DAILY AS NEEDED FOR CONSTIPATION ( TAKE AS NEEDED TO MAINTAIN 3 TO 4 BOWEL MOVEMENTS DAILY) 6/4/2019 at Unknown time Yes Gonzalo Wahl MD   furosemide (LASIX) 20 MG tablet Take 1 tablet (20 mg) by mouth daily 6/3/2019 at Unknown time Yes Celestino Verduzco PA-C   MULTIPLE VITAMINS PO Take 1 tablet by mouth daily 6/3/2019 at Unknown time Yes Reported, Patient   rifaximin (XIFAXAN) 550 MG TABS tablet Take 1 tablet (550 mg) by mouth 2 times daily 6/3/2019 at Unknown time Yes Rosette Wills MD   vitamin B1 (THIAMINE) 100 MG tablet Take 1 tablet (100 mg) by mouth daily 6/3/2019 at Unknown time Yes Leonarda Cabrera MD   vitamin D3 (CHOLECALCIFEROL) 2000 units (50 mcg) tablet Take 1 tablet by mouth daily 6/3/2019 at Unknown time Yes Unknown, Entered By History   spironolactone (ALDACTONE) 50 MG tablet Take 2 tablets (100 mg) by mouth daily  Patient taking differently: Take 50 mg by mouth daily    Celestino Verduzco PA-C         Medication history completed by: Emmett Boyd PharmD 4 Student

## 2019-06-04 NOTE — PROGRESS NOTES
"CLINICAL NUTRITION SERVICES - ASSESSMENT NOTE     Nutrition Prescription    RECOMMENDATIONS FOR MDs/PROVIDERS TO ORDER:  Consider scheduling potassium replacement   Recommend 2 gm sodium diet once pt able to resume oral diet    Malnutrition Status:    Non-severe malnutrition in the context of acute on chronic illness    Future/Additional Recommendations:  Once diet advances, schedule Boost Breeze daily and Gelatein Plus BID between meals     REASON FOR ASSESSMENT  Ivan Bell is a/an 53 year old male assessed by the dietitian for Provider Order - \"non severe malnutrition\"    NUTRITION HISTORY  PMH includes, alcoholic cirrhosis (c/b ascites, HE, EV), s/p TIPS (still requiring paracentesis). Pt report poor PO recently. Seen by [facility] RD in March, 2019. Pt reported a good appetite at that time. Was taking Boost Breeze and Gelatein Plus at the time. Of note, pt also hypokalemic at admission in March.      Met with pt at bedside. Pt appeared lethargic and confused. States he has been eating less lately, about 2 meals, but unsure of how long he has had reduced intake. When asked what kinds of foods he has jassi eating at his meals, pt states, \"as much as I can.\" Pt reports no weight loss, although documented weight suggest ~ 10 lb loss over past 3 months. Pt agreeable to schedule Boost Breeze and Gelatein Plus this admission to help meet increased protein needs. Pt states he does not like cottage cheese.      CURRENT NUTRITION ORDERS  Diet: NPO  Intake/Tolerance: No intake d/t NPO for procedure.    LABS  Labs reviewed  K+ 2.2 (L), Mg ++ 2 (WNL) - on 6/4/19  Vitamin D 7 (L) - On 4/1/19    MEDICATIONS  Medications reviewed  Folic acid, thiamine  Thera-vit-M  Potassium replacement protocol     ANTHROPOMETRICS  Height: 177.8 cm (5' 10\")  Most Recent Weight: 68.9 kg (152 lb)    IBW: 75.5 kg  BMI: Normal BMI  Weight History: Pt has lost 8.3 lbs (5.2%) in the past 3 months  Wt Readings from Last 15 Encounters:   06/04/19 68.9 kg " (152 lb)   05/31/19 70.5 kg (155 lb 8 oz)   05/16/19 71 kg (156 lb 9.6 oz)   05/16/19 74.9 kg (165 lb 3.2 oz)   05/02/19 70.4 kg (155 lb 1.6 oz)   04/17/19 70.9 kg (156 lb 3.2 oz)   04/17/19 73.9 kg (163 lb)   04/12/19 78.2 kg (172 lb 6.4 oz)   04/01/19 76.7 kg (169 lb)   03/21/19 72.7 kg (160 lb 4.8 oz)   09/21/18 86.4 kg (190 lb 6 oz)   09/10/18 81.7 kg (180 lb 3.2 oz)   08/23/18 82.1 kg (180 lb 14.4 oz)   07/02/18 76.9 kg (169 lb 9.6 oz)   06/09/18 72.6 kg (160 lb)     Dosing Weight: 69 kg    ASSESSED NUTRITION NEEDS  Estimated Energy Needs: 2792-8457+ kcals/day (25 - 30+ kcals/kg)  Justification: Maintenance (recommend higher end of range given recent wt loss)  Estimated Protein Needs:  grams protein/day (1.2 - 1.5 grams of pro/kg)  Justification: Increased needs 2/2 liver disease and repletion   Estimated Fluid Needs: 4880-2235 mL/day (1 mL/kcal)   Justification: Maintenance( or Per provider pending fluid status)    PHYSICAL FINDINGS  See malnutrition section below.  Jaundice      MALNUTRITION  % Intake: < 75% for > 7 days (non-severe)  % Weight Loss: Up to 7.5% in 3 months (non-severe)  Subcutaneous Fat Loss: Facial region and Thoracic/intercostal:  Moderate  Muscle Loss: Temporal, Facial & jaw region, Thoracic region (clavicle, acromium bone, deltoid, trapezius, pectoral), Upper arm (bicep, tricep), Lower arm  (forearm), Upper leg (quadricep, hamstring) and Patellar region:  Moderate  Fluid Accumulation/Edema: None noted  Malnutrition Diagnosis: Non-severe malnutrition in the context of acute on chronic illness    NUTRITION DIAGNOSIS  Inadequate oral intake related to poor appetite as evidenced by wt loss of 5.2% of body wt in the past 3 months and moderate muscle and subcutaneous fat depletion.       INTERVENTIONS  Implementation  Nutrition education for nutrition relationship to health/disease - Discussed importance for adequate intake and increased protein needs. Provided examples of protein sources  "and snacks/supplemetns available to pt to help meet needs. Discussed likelihood that oral diet will be sodium restricted. Asked pt if he had any questions about a 2 gm sodium diet and pt shook his head no, stating \"I've already been following it.\"    Goals  Patient to consume % of nutritionally adequate meal trays TID, or the equivalent with supplements/snacks.     Monitoring/Evaluation  Progress toward goals will be monitored and evaluated per protocol.    Dorothy Silva, CITLALI, LD  Pgr: [phone]      "

## 2019-06-04 NOTE — H&P
[facility], [place]    History and Physical - Hospitalist Service, Gold 8       Date of Admission:  6/4/2019    Assessment & Plan   Ivan Bell is a 53 year old male with a hx of alcohol cirrhosis (c/b ascites s/p TIPS but requiring crissy, HE, EV), alcohol use disorder, recent AMBER and type II RTA, and non severe malnutrition in setting of acute on chronic illness who is being admitted for AMS in setting of not taking lactulose and abdominal discomfort.    Encephalopathy - likely HE  Admitted with increased confusion x 1d, unclear if he was compliant with lactulose, but had only 1 BM yesterday. He reports overall poor po intake recently, and has been taking his diuretics as prescribed. UOP has been normal. Denies recent ETOH use. No known head injury. Ammonia 62. CXR neg. BCx and UA pending. Alert and oriented to person and place, not oriented to time. Positive asterixis. No focal neuro deficits, strength symmetric in all extremities. PERRLA. ?if HE is 2/2 decreased stool output in setting of severe hypokalemia (as below).   - Fall precautions  - [place] protocol with lactulose  - Replace K as below  - Follow UA/UC and blood cultures  - Start thiamine, folic acid  - CAPS consult for diagnostic paracentesis  - Repeat Abd US with dopplers to assess TIPS  - Will need PT/OT consults when appropriate  - FEN: mIVF 100cc/hr, reg diet when mentation clears    Alcohol cirrhosis c/b ascites, HE, EV  Hx of ascites s/p TIPS placement 6/6/18, on lasix + aldactone pta, last paracentesis 5/31/19 with 1900cc removed. Hx of HE on lactulose. Hx of variceal bleed in 2017, last EGD 4/2018 with med EV with banding x 4, mild portal hypertensive gastropathy. US 4/13/19 showed cirrhotic liver with e/o portal HTN with mod/large ascites, BL renal stones, improvement in mid stent velocities of the TIPS. Recent admission for alcoholic hepatitis in 3/2019 requiring steroids. MELD Na 25.   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 Recent admission, had RTA felt 2/2 acute tubular necrosis from transient ischemia after pre-renal AMBER, which required albumin + aggressive K repletion + sodium bicarb.  It is likely that patient has RTA again, bicarb 18, AG 7, Cr 1.37, BUN 12, K 2.2.   - Nephrology consult  - Check urine K and urine Na  - Hold lasix and aldactone pending improvement in Cr  - Give albumin 75g daily x 3d  - BMP daily  - Replace K as below    Hypokalemia. Has been recurrent issue in setting of RTA. K 2.2. S/p replacement in ED. Repeat K at 1200, start K replacement protocol. Check Mg.    Alcohol use disorder. Was sober x 18months, but relapsed around ~1/2019. Admitted with alcoholic hepatitis 3/2019. Cont to encourage cessation. Will consider CD consult once appropriate.     Thrombocytopenia. BL Plt ~40-80. Plt 85. Likely 2/2 ESLD. No s/sx of bleeding. Monitor.     Non severe malnutrition in setting of acute on chronic illness. Cont MVI, folic acid, and thiamine. Nutrition consult.       Fluids: LR 100cc/hr  Diet: NPO pending mentation  DVT Prophylaxis: Pneumatic Compression Devices  Blue Catheter: not present  Code Status: FULL code    Disposition Plan   Expected discharge: 2 - 3 days, recommended to prior living arrangement once medically stable.  Entered: Marilee Dawkins PA-C 06/04/2019, 9:06 AM     The patient's care was discussed with the Attending Physician, Dr. Guerra.    Marilee Dawkins PA-C  [facility], [place]  Pager: [phone]  Please see sticky note for cross cover information  ______________________________________________________________________    Chief Complaint   AMS    History of Present Illness   Ivan Bell is a 53 year old male with a hx of alcohol cirrhosis (c/b ascites s/p TIPS but  requiring crissy, HE, EV), alcohol use disorder, recent AMBER and type II RTA, and non severe malnutrition in setting of acute on chronic illness who is being admitted for AMS in setting of not taking lactulose and abdominal discomfort.    Per patients wife, he had a paracentesis last Friday and was feeling well for a few days following and tehn started complaining of some abdominal cramps. He did take his lactulose yesterday but only had 1 BM following. His wife noted that he was confused and not acting normal, so she gave him a few doses of lactulose and brought him into the ED. In the ED, patient was noted to have an elevated ammonia of 62.. He reports overall poor po intake recently, and has been taking his diuretics as prescribed. UOP has been normal. Denies recent ETOH use. No known head injury. He denies any headache. No fevers or chills. Abdominal distention has improved. No chest pain, sob, or dyspnea. He is alert and oriented to person and place, not oriented to time. He is able to tell me his wifes name and although his response time is slow, he is responding appropriately. He is not sure if he has any BM today. Denies any emesis, mild nausea. No abdominal pain. No hematemesis or hematochezia.     Review of Systems    The 10 point Review of Systems is negative other than noted in the HPI or here.     Past Medical History    I have reviewed this patient's medical history and updated it with pertinent information if needed.   Past Medical History:   Diagnosis Date     Ascites      Cirrhosis (H)      Esophageal varices (H)      Hepatitis      Hypertension      Left calcaneus fracture 1/9/2006 January 16, 2006: Fell 10 feet from ladder onto left foot on frozen ground on 1/9/06 at home.  Immediate pain and unable to walk- seen at [place] and diagnosed with calcaneus fracture     Portal vein thrombosis     left occlusion, partial main       Past Surgical History   I have reviewed this patient's surgical history  and updated it with pertinent information if needed.  Past Surgical History:   Procedure Laterality Date     ANKLE SURGERY Left      COLONOSCOPY N/A 3/31/2016    Procedure: COLONOSCOPY;  Surgeon: Rhys Uriostegui MD;  Location:  GI     ESOPHAGOSCOPY, GASTROSCOPY, DUODENOSCOPY (EGD), COMBINED N/A 3/31/2016    Procedure: COMBINED ESOPHAGOSCOPY, GASTROSCOPY, DUODENOSCOPY (EGD);  Surgeon: Rhys Uriostegui MD;  Location:  GI     ESOPHAGOSCOPY, GASTROSCOPY, DUODENOSCOPY (EGD), COMBINED N/A 3/9/2018    Procedure: COMBINED ESOPHAGOSCOPY, GASTROSCOPY, DUODENOSCOPY (EGD), BIOPSY SINGLE OR MULTIPLE;  EGD;  Surgeon: Gonzalo Wahl MD;  Location:  GI     KNEE SURGERY Left      KNEE SURGERY Right      SIGMOIDOSCOPY FLEXIBLE N/A 10/31/2017    Procedure: SIGMOIDOSCOPY FLEXIBLE;;  Surgeon: Armaan Adams MD;  Location:  GI     TIPS Procedure  2018       Social History   I have reviewed this patient's social history and updated it with pertinent information if needed.  Social History     Tobacco Use     Smoking status: Former Smoker     Types: Dip, chew, snus or snuff     Last attempt to quit: 1998     Years since quittin.7     Smokeless tobacco: Current User     Last attempt to quit: 10/24/2017     Tobacco comment: 1 tin per 10 days.   Substance Use Topics     Alcohol use: No     Alcohol/week: 10.5 oz     Types: 21 Cans of beer per week     Comment: last etoh 16, did have Odouls ~2017     Drug use: No       Family History   I have reviewed this patient's family history and updated it with pertinent information if needed.   Family History   Problem Relation Age of Onset     Family History Negative Mother      Family History Negative Father      Hypertension Father      Cerebrovascular Disease Father 87     Breast Cancer Maternal Grandmother      Rheumatoid Arthritis Daughter      Depression Daughter      Cancer - colorectal No family hx of      Prostate Cancer No  family hx of      Liver Disease No family hx of        Prior to Admission Medications   Prior to Admission Medications   Prescriptions Last Dose Informant Patient Reported? Taking?   CONSTULOSE 10 GM/15ML solution   No No   Sig: TAKE 30MLS BY MOUTH THREE TIMES DAILY AS NEEDED FOR CONSTIPATION ( TAKE AS NEEDED TO MAINTAIN 3 TO 4 BOWEL MOVEMENTS DAILY)   MULTIPLE VITAMINS PO  Self Yes No   Sig: Take 1 tablet by mouth daily   folic acid (FOLVITE) 1 MG tablet   No No   Sig: Take 1 tablet (1 mg) by mouth daily   furosemide (LASIX) 20 MG tablet   No No   Sig: Take 1 tablet (20 mg) by mouth daily   lactulose (CEPHULAC) 20 GM Packet   No No   Sig: Take 1 packet (20 g) by mouth 4 times daily Goal of 3-4 loose BMs per day, extra doses if concerned for confusion or not at goal, may hold for that day if reaches goal   pantoprazole (PROTONIX) 40 MG EC tablet   No No   Sig: Take 1 tablet (40 mg) by mouth every morning (before breakfast)   potassium chloride ER (K-DUR/KLOR-CON M) 20 MEQ CR tablet   No No   Sig: Take 1 tablet (20 mEq) by mouth 2 times daily   rifaximin (XIFAXAN) 550 MG TABS tablet   No No   Sig: Take 1 tablet (550 mg) by mouth 2 times daily   spironolactone (ALDACTONE) 50 MG tablet   No No   Sig: Take 2 tablets (100 mg) by mouth daily   vitamin B1 (THIAMINE) 100 MG tablet   No No   Sig: Take 1 tablet (100 mg) by mouth daily      Facility-Administered Medications: None     Allergies   Allergies   Allergen Reactions     Benadryl [Diphenhydramine] Other (See Comments)     Delirium (visual and auditory hallucinations)     Oxycodone Other (See Comments)     Delirium and constipation       Physical Exam   Vital Signs: Temp: 98.1  F (36.7  C) Temp src: Oral BP: 150/80 Pulse: 98   Resp: 16 SpO2: 100 % O2 Device: None (Room air)    Weight: 152 lbs 0 oz  GENERAL: Alert and oriented x 2. NAD, Resting in bed, appears comfortable  HEENT: icteric sclera, MM slightly dry, PERRLA, EOMI  CV: RRR.  RESPIRATORY: Effort normal on RA.  Lungs CTAB.  GI: Abdomen soft and mildly distended with normoactive bowel sounds present in all quadrants. No tenderness, rebound, guarding.   NEUROLOGICAL: A&O to person and place, not oriented to time. Moves all extremities symmetrically. Slow responses to questions but no word finding difficulties or slurred speech. + asterixis.   EXTREMITIES: No peripheral edema.  SKIN: generalized jaundice

## 2019-06-04 NOTE — ED NOTES
Bed: ED15  Expected date: 6/4/19  Expected time: 6:58 AM  Means of arrival:   Comments:  N302. 53M. Confusion after paracentesis on Friday. Yellow.

## 2019-06-04 NOTE — ED TRIAGE NOTES
BIBA from home. Last had paracentesis 5/31. Since has had confusion with noticeable disorientation to time. Also c/o abdominal pain and feeling tired.

## 2019-06-04 NOTE — LETTER
Transition Communication Hand-off for Care Transitions to Next Level of Care Provider    Name: Ivan Bell  : 1965  MRN #: [id number]  Primary Care Provider: Rosette Wills     Primary Clinic: [address] [place] [address] [address] [place] [address]     Reason for Hospitalization:  Hepatic encephalopathy (H) [K72.90]  Hypokalemia [E87.6]  Altered mental status, unspecified altered mental status type [R41.82]  Admit Date/Time: 2019  7:10 AM  Discharge Date: 19  Payor Source: Payor: BCBS / Plan: [place] / Product Type: Indemnity /                  Reason for Communication Hand-off Referral: Other encephalopathy    Discharge Plan: PCP in 1 week with labs       Concern for non-adherence with plan of care:   Y/N N  Discharge Needs Assessment:  Needs      Most Recent Value   Equipment Currently Used at Home  none            Follow-up specialty is recommended: Yes    Follow-up plan:    Future Appointments   Date Time Provider Department Center   2019  9:30 AM Provider,  Spec Inf Para [facility]   2019  7:00 AM  LAB [facility]   2019  8:00 AM Gonzalo Wahl MD [facility]   10/7/2019  7:30 AM  LAB [facility]   10/7/2019  8:30 AM Gonzalo Wahl MD [facility]       Any outstanding tests or procedures:        Referrals     Future Labs/Procedures    NEPHROLOGY ADULT REFERRAL     Comments:    Your provider has referred you to: [facility]: Kid[facility] (Nephrology[facility] [phone]   [email or web address]    Please be aware that coverage of these services is subject to the terms and limitations of your health insurance plan.  Call member services at your health plan with any benefit or coverage questions.      Reason for referral:  Renal tubular acidosis  Please bring the following to your appointment:    >>   Any x-rays, CTs or MRIs which have been performed.  Contact the facility where they were done to arrange for  prior  to your scheduled appointment.   >>   List of current medications   >>   This referral request   >>   Any documents/labs given to you for this referral            Key Recommendations:      Bianca Kim    AVS/Discharge Summary is the source of truth; this is a helpful guide for improved communication of patient story

## 2019-06-04 NOTE — PLAN OF CARE
"/85 (BP Location: Right arm)   Pulse 80   Temp 97.8  F (36.6  C) (Oral)   Resp 16   Ht 1.778 m (5' 10\")   Wt 68.9 kg (152 lb)   SpO2 100%   BMI 21.81 kg/m      Patient admitted for encephalopathy after wife noticed an increase in confusion and inability to concentrate. In the ED, K+ was 2.2 and replaced before arrival to the unit. Ammonia was 62 on arrival and 2 doses of lactulose were given in the ED.      Patient VSS on RA; afebrile. Denies pain. Tolerating regular diet with fair appetite. K+ recheck after replaced in the ED was 2.7, currently being replaced with 60 mEq. Lactulose given x2, 2 stools. Voiding spontaneously, awaiting urine for UA and Urine potassium. Skin is bruised. Up around room with SBA. Bed alarm on for safety. Will continue with POC and notify MD with changes or concerns.       "

## 2019-06-05 ENCOUNTER — APPOINTMENT (OUTPATIENT)
Dept: OCCUPATIONAL THERAPY | Facility: CLINIC | Age: 54
End: 2019-06-05
Attending: PHYSICIAN ASSISTANT
Payer: COMMERCIAL

## 2019-06-05 LAB
ALBUMIN SERPL-MCNC: 3 G/DL (ref 3.4–5)
ALBUMIN UR-MCNC: 10 MG/DL
ALP SERPL-CCNC: 131 U/L (ref 40–150)
ALT SERPL W P-5'-P-CCNC: 13 U/L (ref 0–70)
ANION GAP SERPL CALCULATED.3IONS-SCNC: 7 MMOL/L (ref 3–14)
ANION GAP SERPL CALCULATED.3IONS-SCNC: 9 MMOL/L (ref 3–14)
APPEARANCE UR: CLEAR
APTT PPP: 49 SEC (ref 22–37)
AST SERPL W P-5'-P-CCNC: 34 U/L (ref 0–45)
BACTERIA #/AREA URNS HPF: ABNORMAL /HPF
BASOPHILS # BLD AUTO: 0 10E9/L (ref 0–0.2)
BASOPHILS NFR BLD AUTO: 0 %
BILIRUB DIRECT SERPL-MCNC: 4 MG/DL (ref 0–0.2)
BILIRUB SERPL-MCNC: 7.3 MG/DL (ref 0.2–1.3)
BILIRUB UR QL STRIP: NEGATIVE
BUN SERPL-MCNC: 10 MG/DL (ref 7–30)
BUN SERPL-MCNC: 8 MG/DL (ref 7–30)
CALCIUM SERPL-MCNC: 8.7 MG/DL (ref 8.5–10.1)
CALCIUM SERPL-MCNC: 8.8 MG/DL (ref 8.5–10.1)
CHLORIDE SERPL-SCNC: 118 MMOL/L (ref 94–109)
CHLORIDE SERPL-SCNC: 120 MMOL/L (ref 94–109)
CHLORIDE UR-SCNC: 48 MMOL/L
CO2 SERPL-SCNC: 14 MMOL/L (ref 20–32)
CO2 SERPL-SCNC: 16 MMOL/L (ref 20–32)
COLOR UR AUTO: YELLOW
CREAT SERPL-MCNC: 1.19 MG/DL (ref 0.66–1.25)
CREAT SERPL-MCNC: 1.23 MG/DL (ref 0.66–1.25)
DIFFERENTIAL METHOD BLD: ABNORMAL
EOSINOPHIL # BLD AUTO: 0.2 10E9/L (ref 0–0.7)
EOSINOPHIL NFR BLD AUTO: 8.9 %
ERYTHROCYTE [DISTWIDTH] IN BLOOD BY AUTOMATED COUNT: 17.7 % (ref 10–15)
ERYTHROCYTE [DISTWIDTH] IN BLOOD BY AUTOMATED COUNT: 17.7 % (ref 10–15)
ERYTHROCYTE [DISTWIDTH] IN BLOOD BY AUTOMATED COUNT: 17.9 % (ref 10–15)
FOLATE SERPL-MCNC: 66 NG/ML
GFR SERPL CREATININE-BSD FRML MDRD: 65 ML/MIN/{1.73_M2}
GFR SERPL CREATININE-BSD FRML MDRD: 68 ML/MIN/{1.73_M2}
GLUCOSE SERPL-MCNC: 197 MG/DL (ref 70–99)
GLUCOSE SERPL-MCNC: 94 MG/DL (ref 70–99)
GLUCOSE UR STRIP-MCNC: NEGATIVE MG/DL
HCT VFR BLD AUTO: 22.5 % (ref 40–53)
HCT VFR BLD AUTO: 24.2 % (ref 40–53)
HCT VFR BLD AUTO: 24.2 % (ref 40–53)
HGB BLD-MCNC: 7.2 G/DL (ref 13.3–17.7)
HGB BLD-MCNC: 7.5 G/DL (ref 13.3–17.7)
HGB BLD-MCNC: 7.7 G/DL (ref 13.3–17.7)
HGB UR QL STRIP: ABNORMAL
IMM GRANULOCYTES # BLD: 0 10E9/L (ref 0–0.4)
IMM GRANULOCYTES NFR BLD: 0 %
INR PPP: 2.45 (ref 0.86–1.14)
KETONES UR STRIP-MCNC: NEGATIVE MG/DL
LEUKOCYTE ESTERASE UR QL STRIP: ABNORMAL
LYMPHOCYTES # BLD AUTO: 0.5 10E9/L (ref 0.8–5.3)
LYMPHOCYTES NFR BLD AUTO: 22 %
MAGNESIUM SERPL-MCNC: 1.8 MG/DL (ref 1.6–2.3)
MAGNESIUM SERPL-MCNC: 1.9 MG/DL (ref 1.6–2.3)
MCH RBC QN AUTO: 30.9 PG (ref 26.5–33)
MCH RBC QN AUTO: 31 PG (ref 26.5–33)
MCH RBC QN AUTO: 31.2 PG (ref 26.5–33)
MCHC RBC AUTO-ENTMCNC: 31 G/DL (ref 31.5–36.5)
MCHC RBC AUTO-ENTMCNC: 31.8 G/DL (ref 31.5–36.5)
MCHC RBC AUTO-ENTMCNC: 32 G/DL (ref 31.5–36.5)
MCV RBC AUTO: 100 FL (ref 78–100)
MCV RBC AUTO: 97 FL (ref 78–100)
MCV RBC AUTO: 97 FL (ref 78–100)
MONOCYTES # BLD AUTO: 0.2 10E9/L (ref 0–1.3)
MONOCYTES NFR BLD AUTO: 8.9 %
MUCOUS THREADS #/AREA URNS LPF: PRESENT /LPF
NEUTROPHILS # BLD AUTO: 1.5 10E9/L (ref 1.6–8.3)
NEUTROPHILS NFR BLD AUTO: 60.2 %
NITRATE UR QL: NEGATIVE
NRBC # BLD AUTO: 0 10*3/UL
NRBC BLD AUTO-RTO: 0 /100
PH UR STRIP: 7.5 PH (ref 5–7)
PHOSPHATE SERPL-MCNC: 1.8 MG/DL (ref 2.5–4.5)
PHOSPHATE SERPL-MCNC: 2 MG/DL (ref 2.5–4.5)
PLATELET # BLD AUTO: 52 10E9/L (ref 150–450)
PLATELET # BLD AUTO: 52 10E9/L (ref 150–450)
PLATELET # BLD AUTO: 53 10E9/L (ref 150–450)
POTASSIUM SERPL-SCNC: 2.9 MMOL/L (ref 3.4–5.3)
POTASSIUM SERPL-SCNC: 3.4 MMOL/L (ref 3.4–5.3)
POTASSIUM SERPL-SCNC: 3.4 MMOL/L (ref 3.4–5.3)
POTASSIUM UR-SCNC: 22 MMOL/L
PROT SERPL-MCNC: 5.3 G/DL (ref 6.8–8.8)
RBC # BLD AUTO: 2.31 10E12/L (ref 4.4–5.9)
RBC # BLD AUTO: 2.42 10E12/L (ref 4.4–5.9)
RBC # BLD AUTO: 2.49 10E12/L (ref 4.4–5.9)
RBC #/AREA URNS AUTO: 136 /HPF (ref 0–2)
RETICS # AUTO: 45.4 10E9/L (ref 25–95)
RETICS/RBC NFR AUTO: 1.9 % (ref 0.5–2)
SODIUM SERPL-SCNC: 142 MMOL/L (ref 133–144)
SODIUM SERPL-SCNC: 143 MMOL/L (ref 133–144)
SODIUM UR-SCNC: 53 MMOL/L
SOURCE: ABNORMAL
SP GR UR STRIP: 1.01 (ref 1–1.03)
UROBILINOGEN UR STRIP-MCNC: 2 MG/DL (ref 0–2)
VIT B12 SERPL-MCNC: 1693 PG/ML (ref 193–986)
WBC # BLD AUTO: 2 10E9/L (ref 4–11)
WBC # BLD AUTO: 2.3 10E9/L (ref 4–11)
WBC # BLD AUTO: 2.5 10E9/L (ref 4–11)
WBC #/AREA URNS AUTO: 58 /HPF (ref 0–5)

## 2019-06-05 PROCEDURE — 80048 BASIC METABOLIC PNL TOTAL CA: CPT | Performed by: PHYSICIAN ASSISTANT

## 2019-06-05 PROCEDURE — 25000128 H RX IP 250 OP 636: Performed by: INTERNAL MEDICINE

## 2019-06-05 PROCEDURE — 40000893 ZZH STATISTIC PT IP EVAL DEFER: Performed by: REHABILITATION PRACTITIONER

## 2019-06-05 PROCEDURE — 25800030 ZZH RX IP 258 OP 636: Performed by: INTERNAL MEDICINE

## 2019-06-05 PROCEDURE — 99233 SBSQ HOSP IP/OBS HIGH 50: CPT | Performed by: INTERNAL MEDICINE

## 2019-06-05 PROCEDURE — 84300 ASSAY OF URINE SODIUM: CPT | Performed by: EMERGENCY MEDICINE

## 2019-06-05 PROCEDURE — 84133 ASSAY OF URINE POTASSIUM: CPT | Performed by: EMERGENCY MEDICINE

## 2019-06-05 PROCEDURE — 36415 COLL VENOUS BLD VENIPUNCTURE: CPT | Performed by: INTERNAL MEDICINE

## 2019-06-05 PROCEDURE — 25000128 H RX IP 250 OP 636: Performed by: PHYSICIAN ASSISTANT

## 2019-06-05 PROCEDURE — 12000012 ZZH R&B MS OVERFLOW UMMC

## 2019-06-05 PROCEDURE — 82746 ASSAY OF FOLIC ACID SERUM: CPT | Performed by: INTERNAL MEDICINE

## 2019-06-05 PROCEDURE — 81001 URINALYSIS AUTO W/SCOPE: CPT | Performed by: EMERGENCY MEDICINE

## 2019-06-05 PROCEDURE — 85027 COMPLETE CBC AUTOMATED: CPT | Performed by: INTERNAL MEDICINE

## 2019-06-05 PROCEDURE — 85730 THROMBOPLASTIN TIME PARTIAL: CPT | Performed by: PHYSICIAN ASSISTANT

## 2019-06-05 PROCEDURE — 84132 ASSAY OF SERUM POTASSIUM: CPT | Performed by: INTERNAL MEDICINE

## 2019-06-05 PROCEDURE — 85025 COMPLETE CBC W/AUTO DIFF WBC: CPT | Performed by: PHYSICIAN ASSISTANT

## 2019-06-05 PROCEDURE — 80076 HEPATIC FUNCTION PANEL: CPT | Performed by: PHYSICIAN ASSISTANT

## 2019-06-05 PROCEDURE — 82436 ASSAY OF URINE CHLORIDE: CPT | Performed by: EMERGENCY MEDICINE

## 2019-06-05 PROCEDURE — 83735 ASSAY OF MAGNESIUM: CPT | Performed by: PHYSICIAN ASSISTANT

## 2019-06-05 PROCEDURE — 25000125 ZZHC RX 250: Performed by: INTERNAL MEDICINE

## 2019-06-05 PROCEDURE — 80048 BASIC METABOLIC PNL TOTAL CA: CPT | Performed by: INTERNAL MEDICINE

## 2019-06-05 PROCEDURE — 25000132 ZZH RX MED GY IP 250 OP 250 PS 637: Performed by: PHYSICIAN ASSISTANT

## 2019-06-05 PROCEDURE — 36415 COLL VENOUS BLD VENIPUNCTURE: CPT | Performed by: PHYSICIAN ASSISTANT

## 2019-06-05 PROCEDURE — P9047 ALBUMIN (HUMAN), 25%, 50ML: HCPCS | Performed by: PHYSICIAN ASSISTANT

## 2019-06-05 PROCEDURE — C9113 INJ PANTOPRAZOLE SODIUM, VIA: HCPCS | Performed by: INTERNAL MEDICINE

## 2019-06-05 PROCEDURE — 84100 ASSAY OF PHOSPHORUS: CPT | Performed by: INTERNAL MEDICINE

## 2019-06-05 PROCEDURE — 83735 ASSAY OF MAGNESIUM: CPT | Performed by: INTERNAL MEDICINE

## 2019-06-05 PROCEDURE — 80307 DRUG TEST PRSMV CHEM ANLYZR: CPT | Performed by: HOSPITALIST

## 2019-06-05 PROCEDURE — 97165 OT EVAL LOW COMPLEX 30 MIN: CPT | Mod: GO

## 2019-06-05 PROCEDURE — 25000132 ZZH RX MED GY IP 250 OP 250 PS 637: Performed by: INTERNAL MEDICINE

## 2019-06-05 PROCEDURE — 97535 SELF CARE MNGMENT TRAINING: CPT | Mod: GO

## 2019-06-05 PROCEDURE — 97530 THERAPEUTIC ACTIVITIES: CPT | Mod: GO

## 2019-06-05 PROCEDURE — 85610 PROTHROMBIN TIME: CPT | Performed by: PHYSICIAN ASSISTANT

## 2019-06-05 PROCEDURE — 84100 ASSAY OF PHOSPHORUS: CPT | Performed by: PHYSICIAN ASSISTANT

## 2019-06-05 PROCEDURE — 85045 AUTOMATED RETICULOCYTE COUNT: CPT | Performed by: INTERNAL MEDICINE

## 2019-06-05 PROCEDURE — 82607 VITAMIN B-12: CPT | Performed by: INTERNAL MEDICINE

## 2019-06-05 RX ORDER — FUROSEMIDE 20 MG
20 TABLET ORAL DAILY
Status: DISCONTINUED | OUTPATIENT
Start: 2019-06-06 | End: 2019-06-07 | Stop reason: HOSPADM

## 2019-06-05 RX ORDER — SPIRONOLACTONE 100 MG/1
100 TABLET, FILM COATED ORAL DAILY
Status: DISCONTINUED | OUTPATIENT
Start: 2019-06-05 | End: 2019-06-07 | Stop reason: HOSPADM

## 2019-06-05 RX ORDER — PANTOPRAZOLE SODIUM 40 MG/1
40 TABLET, DELAYED RELEASE ORAL
Status: DISCONTINUED | OUTPATIENT
Start: 2019-06-06 | End: 2019-06-07 | Stop reason: HOSPADM

## 2019-06-05 RX ORDER — CEFTRIAXONE 1 G/1
1 INJECTION, POWDER, FOR SOLUTION INTRAMUSCULAR; INTRAVENOUS EVERY 24 HOURS
Status: DISCONTINUED | OUTPATIENT
Start: 2019-06-05 | End: 2019-06-05

## 2019-06-05 RX ORDER — OCTREOTIDE ACETATE 50 UG/ML
50 INJECTION, SOLUTION INTRAVENOUS; SUBCUTANEOUS ONCE
Status: COMPLETED | OUTPATIENT
Start: 2019-06-05 | End: 2019-06-05

## 2019-06-05 RX ADMIN — LACTULOSE 20 G: 20 SOLUTION ORAL at 09:02

## 2019-06-05 RX ADMIN — LACTULOSE 20 G: 20 SOLUTION ORAL at 20:03

## 2019-06-05 RX ADMIN — OCTREOTIDE ACETATE 50 MCG/HR: 200 INJECTION, SOLUTION INTRAVENOUS; SUBCUTANEOUS at 09:10

## 2019-06-05 RX ADMIN — SPIRONOLACTONE 100 MG: 100 TABLET, FILM COATED ORAL at 20:03

## 2019-06-05 RX ADMIN — LACTULOSE 20 G: 20 SOLUTION ORAL at 13:00

## 2019-06-05 RX ADMIN — LACTULOSE 20 G: 20 SOLUTION ORAL at 11:12

## 2019-06-05 RX ADMIN — FOLIC ACID 1 MG: 1 TABLET ORAL at 08:28

## 2019-06-05 RX ADMIN — POTASSIUM PHOSPHATE, MONOBASIC AND POTASSIUM PHOSPHATE, DIBASIC 20 MMOL: 224; 236 INJECTION, SOLUTION INTRAVENOUS at 08:42

## 2019-06-05 RX ADMIN — Medication 10 MEQ: at 06:03

## 2019-06-05 RX ADMIN — OCTREOTIDE ACETATE 50 MCG: 50 INJECTION, SOLUTION INTRAVENOUS; SUBCUTANEOUS at 09:10

## 2019-06-05 RX ADMIN — POTASSIUM CHLORIDE: 2 INJECTION, SOLUTION, CONCENTRATE INTRAVENOUS at 03:59

## 2019-06-05 RX ADMIN — Medication 10 MEQ: at 02:55

## 2019-06-05 RX ADMIN — POTASSIUM CHLORIDE: 2 INJECTION, SOLUTION, CONCENTRATE INTRAVENOUS at 16:44

## 2019-06-05 RX ADMIN — RIFAXIMIN 550 MG: 550 TABLET ORAL at 20:03

## 2019-06-05 RX ADMIN — LACTULOSE 20 G: 20 SOLUTION ORAL at 22:17

## 2019-06-05 RX ADMIN — THERA TABS 1 TABLET: TAB at 08:28

## 2019-06-05 RX ADMIN — Medication 10 MEQ: at 03:58

## 2019-06-05 RX ADMIN — Medication 10 MEQ: at 05:03

## 2019-06-05 RX ADMIN — ALBUMIN HUMAN 75 G: 0.25 SOLUTION INTRAVENOUS at 08:29

## 2019-06-05 RX ADMIN — Medication 10 MEQ: at 01:57

## 2019-06-05 RX ADMIN — POTASSIUM PHOSPHATE, MONOBASIC AND POTASSIUM PHOSPHATE, DIBASIC 15 MMOL: 224; 236 INJECTION, SOLUTION INTRAVENOUS at 22:44

## 2019-06-05 RX ADMIN — RIFAXIMIN 550 MG: 550 TABLET ORAL at 08:28

## 2019-06-05 RX ADMIN — Medication 10 MEQ: at 07:18

## 2019-06-05 RX ADMIN — PANTOPRAZOLE SODIUM 40 MG: 40 INJECTION, POWDER, LYOPHILIZED, FOR SOLUTION INTRAVENOUS at 08:28

## 2019-06-05 RX ADMIN — CEFTRIAXONE 1 G: 1 INJECTION, POWDER, FOR SOLUTION INTRAMUSCULAR; INTRAVENOUS at 09:04

## 2019-06-05 RX ADMIN — Medication 100 MG: at 08:28

## 2019-06-05 ASSESSMENT — ACTIVITIES OF DAILY LIVING (ADL)
ADLS_ACUITY_SCORE: 13
ADLS_ACUITY_SCORE: 13
PREVIOUS_RESPONSIBILITIES: MEAL PREP;HOUSEKEEPING;LAUNDRY;SHOPPING;YARDWORK;MEDICATION MANAGEMENT;FINANCES;DRIVING;WORK
ADLS_ACUITY_SCORE: 13

## 2019-06-05 ASSESSMENT — PAIN DESCRIPTION - DESCRIPTORS: DESCRIPTORS: ACHING

## 2019-06-05 ASSESSMENT — MIFFLIN-ST. JEOR: SCORE: 1531.62

## 2019-06-05 NOTE — PROGRESS NOTES
Nephrology Progress Note  06/05/2019         Today Recommendations:  - Spironolactone 100 mg BID.   - Continue with KCl supplementation, may consider KCl 40 mEq BID in the setting of higher dose of Spironolactone.   - Sodium bicarbonate 650 mg TID with meals.    - Need iron level.     ASSESSMENT AND RECOMMENDATIONS:   Mr. Ivan Bell is a 53 year old year old male with PMHx of alcohol cirrhosis, complicated by ascites s/p TIPS but still requiring paracentersis, hx of hepatoencephalopathy and esophageal varices, recent AMBER and Typ2 II RTA, admitted for AMS found to have hypokalemia and mildly low bicarb.      #Hypokalemia:  - Patient was seen by nephrology last admission where RTA type 2 (proximal) was diagnosed due to high urine potassium excretion. Urine anion gap was not checked at that time.   - This admission has hypokalemia and mildly low bicarb at 18 on admission.   -This could be caused by RTA but could also be due to GI losses. Since he is on lactulose, will need to get urine anion gap to differentiate.   - Urine Cl: 48, K: 22, Na: 53, Urine anion gap: Na+K-Cl= +27, this means renal potassium loss due to RTA.    - Spironolactone 100 mg BID.   - Continue with KCl supplementation, may consider KCl 40 mEq BID in the setting of higher dose of Spironolactone.   - Today HCO3 is 14, so consider start sodium bicarbonate 650 mg TID with meals.        # CKD stage II:  - Crea baseline ~1-1.2, stable at baseline  - Current Kidney function is compatible with his age, continue monitoring.      # Anemia:  - Hgb is trending down slowly over the past few months.   - Hgb this morning 7.2  - Likely due to liver cirrhosis, need iron level.  - Folate and Vitamin B12: no issue.     # Hypophosphatemia:  - Phos level this morning 2.0, replete as needed.    Recommendations were communicated to primary team via Note.    Seen and discussed with Dr. Joseph.    Urmila Richey MD   Nephrology Fellow  [phone]    Interval  "History :   Nursing and provider notes from last 24 hours reviewed.  In the last 24 hours Ivan Wuor no events overnight. Patient was seen and examined at bedside. Patient states he feels better today. He denies any chest pain, shortness of breath, nausea, vomiting, abdominal pain or fever.     Review of Systems:   I reviewed the following systems:  GI: ok appetite. no nausea or vomiting or diarrhea.   Neuro:  no confusion  Constitutional:  no fever or chills  CV: no dyspnea or edema.  no chest pain.    Physical Exam:   I/O last 3 completed shifts:  In: 1393.33 [P.O.:120; I.V.:1273.33]  Out: 250 [Urine:250]   /57   Pulse 98   Temp 98.6  F (37  C) (Oral)   Resp 18   Ht 1.778 m (5' 10\")   Wt 69 kg (152 lb 3.2 oz)   SpO2 100%   BMI 21.84 kg/m       GENERAL APPEARANCE: NAD  EYES:  no scleral icterus, pupils equal  HENT: mouth without ulcers or lesions  PULM: lungs clear to auscultation bilaterally, equal air movement, no clubbing  CV: regular rhythm, normal rate, no rub     -JVD not elevated.     -edema none   GI: soft, nontender, nondistended, bowel sounds are positive  INTEGUMENT: no cyanosis, no rash  NEURO:  no asterixis     Labs:     I personally reviewed his labs.     Electrolytes/Renal -   Recent Labs   Lab Test 06/05/19  0626 06/05/19  0103 06/04/19  1841 06/04/19  1508 06/04/19  0716  06/11/18  0636     --   --  143 139   < > 140   POTASSIUM 3.4 2.9* 2.5* 2.7* 2.2*   < > 3.3*   CHLORIDE 120*  --   --  115* 112*   < > 110*   CO2 14*  --   --  20 18*   < > 20   BUN 10  --   --  12 12   < > 18   CR 1.23  --   --  1.41* 1.37*   < > 0.94   GLC 94  --   --  128* 115*   < > 97   JULISSA 8.8  --   --  9.5 9.7   < > 8.7   MAG 1.9  --  2.1  --  2.0   < >  --    PHOS 1.8*  --  1.2*  --   --   --  3.2    < > = values in this interval not displayed.       CBC -   Recent Labs   Lab Test 06/05/19  0747 06/05/19  0626 06/04/19  0716   WBC 2.3* 2.5* 4.4   HGB 7.5* 7.7* 9.8*   PLT 53* 52* 85*       LFTs - "   Recent Labs   Lab Test 06/05/19  0626 06/04/19  0716 05/16/19  0805   ALKPHOS 131 198* 245*   BILITOTAL 7.3* 9.2* 11.5*   ALT 13 17 22   AST 34 56* 62*   PROTTOTAL 5.3* 6.6* 5.8*   ALBUMIN 3.0* 3.1* 2.4*       Iron Panel -   Recent Labs   Lab Test 02/25/16  1630   IRON 51   IRONSAT 28   JOAQUIN 1,306*         Imaging:  All imaging studies reviewed by me.     Current Medications:    cefTRIAXone  1 g Intravenous Q24H     folic acid  1 mg Oral Daily     multivitamin, therapeutic  1 tablet Oral Daily     pantoprazole (PROTONIX) IV  40 mg Intravenous Q12H     rifaximin  550 mg Oral BID     vitamin B1  100 mg Oral Daily       - MEDICATION INSTRUCTIONS -       IV infusion builder WITH additives 100 mL/hr at 06/05/19 0359     octreotide (sandoSTATIN) infusion ADULT 50 mcg/hr (06/05/19 0910)     Urmila Richey MD   Nephrology Fellow  Pager: [phone]

## 2019-06-05 NOTE — PLAN OF CARE
RN [id number]:  Patient disoriented to time; otherwise oriented. Westhaven stage I; PRN Lactulose given x3 today. BM x4. AVSS on RA. Denies pain and nausea. Phos 1.8 replaced with 20 mmol IV- recheck 2.0 to be replaced with 15 mmol IV - replacement ordered and will pass on to oncoming RN. Hgb drop from 9.8 to 7.7 this morning. Team notified and patient made NPO for possible scope. GI consulted and made final decision not to scope; hgb recheck this evening 7.2. LR +KCl at 100 ml/hr infusing via PIV. Voiding adequately. Bed alarm activated for safety. Up with SBA. Will continue to monitor and treat per plan of care.

## 2019-06-05 NOTE — PROGRESS NOTES
06/05/19 0900   Quick Adds   Type of Visit Initial Occupational Therapy Evaluation   Living Environment   Lives With spouse   Living Arrangements house   Home Accessibility stairs to enter home;stairs within home   Number of Stairs, Main Entrance 1   Number of Stairs, Within Home, Primary 5   Transportation Anticipated car, drives self   Living Environment Comment Pt lives in a house with his wife. He has a tub/shower combo in the bathroom   Self-Care   Usual Activity Tolerance good   Current Activity Tolerance moderate   Regular Exercise No   Equipment Currently Used at Home none   Activity/Exercise/Self-Care Comment Pt was previously independent with ADL completion   Functional Level   Ambulation 0-->independent   Transferring 0-->independent   Toileting 0-->independent   Bathing 0-->independent   Dressing 0-->independent   Eating 0-->independent   Communication 0-->understands/communicates without difficulty   Cognition 0 - no cognition issues reported   Fall history within last six months no   Which of the above functional risks had a recent onset or change? cognition;ambulation       Present no   Language english   General Information   Onset of Illness/Injury or Date of Surgery - Date 06/04/19   Referring Physician Marilee Dawkins PA-C   Patient/Family Goals Statement To return home   Additional Occupational Profile Info/Pertinent History of Current Problem Ivan Bell is a 53 year old male with a hx of alcohol cirrhosis (c/b ascites s/p TIPS but requiring crissy, HE, EV), alcohol use disorder, recent AMBER and type II RTA, and non severe malnutrition in setting of acute on chronic illness who is being admitted for AMS in setting of not taking lactulose and abdominal discomfort.   Weight-Bearing Status - LUE full weight-bearing   Weight-Bearing Status - RUE full weight-bearing   Weight-Bearing Status - LLE full weight-bearing   Weight-Bearing Status - RLE full weight-bearing    General Observations pt is pleasant and agreeable to therapy   General Info Comments Activity: up with assist   Cognitive Status Examination   Orientation place   Level of Consciousness alert;confused   Follows Commands (Cognition) WFL   Memory impaired   Attention Distractible during evaluation   Executive Function Self awareness/monitoring impaired   Cognitive Comment Pt is alert and oriented to place and year. Administered the MOCA. See care plan for details   Visual Perception   Visual Perception No deficits were identified   Sensory Examination   Sensory Quick Adds No deficits were identified   Integumentary/Edema   Integumentary/Edema no deficits were identifed   Range of Motion (ROM)   ROM Quick Adds No deficits were identified   Strength   Manual Muscle Testing Quick Adds No deficits were identified   Hand Strength   Hand Strength Comments Bilateral  strength WNL   Mobility   Bed Mobility Bed mobility skill: Supine to sit   Bed Mobility Skill: Supine to Sit   Level of Roy: Supine/Sit independent   Transfer Skill: Sit to Stand   Level of Roy: Sit/Stand independent   Lower Body Dressing   Level of Roy: Dress Lower Body independent   Instrumental Activities of Daily Living (IADL)   Previous Responsibilities meal prep;housekeeping;laundry;shopping;yardwork;medication management;finances;driving;work   IADL Comments Pt was previously independent with IADL completion.   Activities of Daily Living Analysis   Impairments Contributing to Impaired Activities of Daily Living cognition impaired   General Therapy Interventions   Planned Therapy Interventions cognition;ADL retraining;IADL retraining;home program guidelines;progressive activity/exercise   Clinical Impression   Criteria for Skilled Therapeutic Interventions Met yes, treatment indicated   OT Diagnosis decreased safety awareness   Influenced by the following impairments cognitive deficits, weakness   Assessment of Occupational  "Performance 1-3 Performance Deficits   Identified Performance Deficits functional cognition, functional mobility   Clinical Decision Making (Complexity) Low complexity   Therapy Frequency 5 times/wk   Predicted Duration of Therapy Intervention (days/wks) 6/15/19   Anticipated Equipment Needs at Discharge other (see comments)  (TBD)   Anticipated Discharge Disposition Home with Assist;Home with Outpatient Therapy   Risks and Benefits of Treatment have been explained. Yes   Patient, Family & other staff in agreement with plan of care Yes   [employer] \"6 Clicks\"   2016, Trustees of [facility], under license to [employer].  All rights reserved.   6 Clicks Short Forms Daily Activity Inpatient Short Form   [facility] AM-PAC  \"6 Clicks\" Daily Activity Inpatient Short Form   1. Putting on and taking off regular lower body clothing? 4 - None   2. Bathing (including washing, rinsing, drying)? 3 - A Little   3. Toileting, which includes using toilet, bedpan or urinal? 4 - None   4. Putting on and taking off regular upper body clothing? 4 - None   5. Taking care of personal grooming such as brushing teeth? 4 - None   6. Eating meals? 4 - None   Daily Activity Raw Score (Score out of 24.Lower scores equate to lower levels of function) 23   Total Evaluation Time   Total Evaluation Time (Minutes) 5     "

## 2019-06-05 NOTE — PLAN OF CARE
PT-7A- PT Consult Order received, per chart review and communication with interdisciplinary care team, pt is mobilizing well, is most limited by AMS and cognition. PT will defer evaluation, as pt had no LOB during OT session, and pt will continue to participate in Occupational Therapy.

## 2019-06-05 NOTE — PROGRESS NOTES
[facility], [facility] Progress Note - Hospitalist Service, Gold 8       Date of Admission:  6/4/2019  Assessment & Plan   Ivan Bell is a 53 year old male with a hx of alcohol cirrhosis (c/b ascites s/p TIPS but requiring crissy, HE, EV), alcohol use disorder, recent AMBER and type II RTA, and non severe malnutrition in setting of acute on chronic illness who is being admitted for AMS in setting of not taking lactulose and abdominal discomfort.    Changes today: Gi consult given 2g drop in hemoglobin. Started octreotide and ceftriaxone, which were stopped after evaluation revealed no bleed. Does have history of varices.     Encephalopathy - likely HE due to hypokalemia  Admitted with increased confusion x 1d, unclear if he was compliant with lactulose, but had only 1 BM day prior to admission. He reports overall poor po intake recently, and has been taking his diuretics as prescribed. UOP has been normal. Denies recent ETOH use. No known head injury. Ammonia 62. CXR neg. No obvious source of infection, no significant ascites to tap. Positive asterixis. No focal neuro deficits, strength symmetric in all extremities. PERRLA.    - Fall precautions  - Westhaven protocol with lactulose  - Replace K as below  - Follow UA/UC and blood cultures  - Start thiamine, folic acid  - PT/OT consults  - FEN: mIVF 100cc/hr, reg diet when mentation clears  - Repeat renal US tomorrow given possible RTA, possible right hydronephrosis     Alcohol cirrhosis c/b ascites, HE, EV  Hx of ascites s/p TIPS placement 6/6/18, on lasix + aldactone pta, last paracentesis 5/31/19 with 1900cc removed. Hx of HE on lactulose. Hx of variceal bleed in 2017, last EGD 4/2018 with med EV with banding x 4, mild portal hypertensive gastropathy. US 4/13/19 showed cirrhotic liver with e/o portal HTN with mod/large ascites, BL renal stones, improvement in mid stent velocities of the TIPS. Recent admission for alcoholic  hepatitis in 3/2019 requiring steroids. MELD Na 25.   - Cont protonix 40mg daily  - Cont rifaximin 550mg BID  - Increase spironolactone to 100mg daily (was taking 50mg daily), restart furosemide  - MELD labs daily     AMBER  Likely type II RTA  Non anion gap metabolic acidosis  BL Cr ~1-1.1. Recent admission, had RTA felt 2/2 acute tubular necrosis from transient ischemia after pre-renal AMBER, which required albumin + aggressive K repletion + sodium bicarb.  It is likely that patient has RTA again, bicarb 18, AG 7, Cr 1.37, BUN 12, K 2.2.   - Nephrology consult  - Check urine K and urine Na  - BMP daily  - Replace K as below     Hypokalemia. Has been recurrent issue in setting of RTA. K 2.2. S/p replacement in ED. Check potassium BID     Alcohol use disorder. Was sober x 18months, but relapsed around ~1/2019. Admitted with alcoholic hepatitis 3/2019. Cont to encourage cessation. Will consider CD consult once appropriate.      Thrombocytopenia. BL Plt ~40-80. Likely 2/2 ESLD. No s/sx of bleeding. Monitor.      Non severe malnutrition in setting of acute on chronic illness. Cont MVI, folic acid, and thiamine. Nutrition consult.     Diet: Regular Diet Adult    DVT Prophylaxis: Low Risk/Ambulatory with no VTE prophylaxis indicated  Blue Catheter: not present  Code Status: Full Code      Disposition Plan   Expected discharge: Tomorrow, recommended to prior living arrangement once potassium stable, mental status improved, hemoglobin stable.  Entered: Tian Perez MD 06/05/2019, 6:24 PM       The patient's care was discussed with the Bedside Nurse, Patient, Patient's Family and gastroenterology Consultant.    Tian Perez MD  Hospitalist Service, [address], [place]  Pager: [phone]  Please see sticky note for cross cover information  ______________________________________________________________________    Interval History   No acute events overnight. He denies black/bloody  stools. No abdominal pain. Thinks his mental status is close to baseline. No change in urination.    Data reviewed today: I reviewed all medications, new labs and imaging results over the last 24 hours.    Physical Exam   Vital Signs: Temp: 98.5  F (36.9  C) Temp src: Oral BP: 127/63 Pulse: 98 Heart Rate: 73 Resp: 18 SpO2: 100 % O2 Device: None (Room air)    Weight: 152 lbs 3.2 oz  Constitutional: awake, alert, cooperative, no apparent distress, and appears stated age  Respiratory: No increased work of breathing, good air exchange, clear to auscultation bilaterally, no crackles or wheezing  Cardiovascular: Normal apical impulse, regular rate and rhythm, normal S1 and S2, no S3 or S4, and no murmur noted  GI: Normal bowel sounds, slightly distended, no tenderness on palpation  Neuropsychiatric: Asterixis present, oriented to person, place, and time

## 2019-06-05 NOTE — PLAN OF CARE
"/74 (BP Location: Right arm)   Pulse 98   Temp 98.2  F (36.8  C) (Oral)   Resp 18   Ht 1.778 m (5' 10\")   Wt 68.9 kg (152 lb)   SpO2 100%   BMI 21.81 kg/m      Vitals: Pt's vitals stable on RA.  Neuro: Pt. disoriented to time. Westhaven score: stage 1 & received PRN Lactulose. Bed alarm on for pt's safety.   Activity: Pt. up with SBA.   LDAs: Right PIV with LR + KCL 20meq at 100cc/hour. Left PIV saline locked.   Respiratory: LS clear bilat.   GI/: Pt. voided spontaneously. Pt. reported 2 loose stools this shift. Urine sent for UA/UC, chloride, potassium & sodium.  Skin: Intact & WNL  Pain: Pt. denies pain or nausea.   Diet: Regular diet.  Labs/Imaging: K+ 2.9 after receiving KCL 60meq IV. Pt. currently receiving KCL 10meq IV x6 doses per protocol. Phos. 1.2 last mode. & pt. received K+ Phos 20mmol IV x1 & recheck with morning labs. K+ recheck with morning labs.  Plan: Continue to follow POC & notify team with change in status.      "

## 2019-06-05 NOTE — PLAN OF CARE
OT 7A  Discharge Planner OT   Patient plan for discharge: Home  Current status: Eval complete, treatment indicated. Independent supine<>EOB, sit<>stand and donning socks. Pt ambulated ~250ft x2 with no AD and SBA. Administered the MOCA 8.1 blind (due to pt not having glasses to see writing sections) and pt scored 7/22, with 18/22 as normal, indicating a cognitive deficit. Pt is unaware of cognitive deficits and denies every missing medications or appointments, etc.   Barriers to return to prior living situation: cognitive deficits, acute medical needs  Recommendations for discharge: Home with 24 hour assist and OP OT due to cognitive deficits  Rationale for recommendations: Pt is primarily independent with ADL completion, however would benefit from continued skilled therapy due to cognitive deficits and safety awareness        Entered by: Anai Barros 06/05/2019 10:58 AM

## 2019-06-05 NOTE — CONSULTS
HEPATOLOGY CONSULTATION      Date of Admission:  6/4/2019  Requesting Dr. 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     Hemoglobin drop  Unlikely UGIB: Hgb dropped 1.5 grams overnight, remained HDS. Patient has been having BMs here and per nurse and per patient they have been brown stools. Rectal exam today without evidence of melena. Suspect drop in hemoglobin is likely dilutional given his wbc, platelets and calcium dropped as well. He does have a history of non bleeding EV which were banded in 4/18 and he is due for a repeat surveillance EGD however this can be done as outpatient.     - ok to discontinue octreotide, ceftriaxone and iv PPI given low suspicion for acute GI bleed (patietn should still be on po PPI)  - if patient agrees to stay one more night we can get another hemoglobin tomorrow, if he wants to be discharge he can follow up with his PCP as an outpatient   - monitor for melena, hemodynamic instability and/or further drop in hemoglobin   - ok to advance diet    Acute metabolic encephalopathy  Likely hepatic encephalopathy: per wife the patient has been more confused since Monday night. He is compliant with his lactulose and rifaximin however he had moderate stool burden evidenced on abdominal XR which means he wasn't having adequate stool output at home. He has been having large BM after given large doses of lactulose and patient seems to be back to his baseline. Still having some asterixis on exam. Possible that he could have a component of Wernicke's encephalopathy given his long term alcohol use, agree with iv thiamine replacement. No evidence of sepsis driving AMS (no URI symptoms, no abdominal pain  to suggest SBP, no fever).  Still has asterixis, would benefit from monitoring overnight or until he clears a bit more.     - consider discharging on thiamine replacement therapy.   - continue PTA lactulose and rifaximin     Alcoholic cirrhosis- compensated  Alcohol use disorder: MELD 26, has been stable. No evidence of HCC on recent US and lastly screened for EV 4/18 (grade II EV, non bleeding, were banded). Is S/p TIPS ~1 year ago and has been getting scheduled paracentesis q2 weeks for the last month. Currently he doesn't have a significant amount of ascites and he appears euvolemic. He had been sober for 18 months but relapsed march of this year when he had to be hospitalized for alcoholic hepatitis. Has been sober since per patient and per wife. As stated above, patient needs repeat EGD for surveillance especially given he had an episode of decompensation recently but this can be done as an outpatient.     - add on ethyl glucoronide urine (to ensure he hasnt had any recent alcohol intake)       Thank you for involving us in this patient's care. Please do not hesitate to contact the GI service with any questions or concerns. Hepatology service will continue to follow.     Patient care plan discussed with Dr. Be, GI staff physician.    Teresa Vallejo MD  Internal Medicine-PGY2  Hepatology service            Chief Complaint: drop in hemoglobin      History is obtained from the patient, wife  and the medical record.          History of Present Illness:   Ivan Bell is a 53 year old male with a medical history as documented below, but pertinent for alcoholic cirrhosis s/p TIPS and known EV s/p banding 4/18, alcohol use disorder in remission for 3 months, RTA type II; admitted into the hospital for confusion; and being seen by hepatology service for concern for upper GI bleed.     Per patient (and corroborated with wife via phone) he has been more confused since Monday evening. Wife says he wasn't making  any sense when was talking. Endorses being complaint with diuretics, rifaximin and lactulose and per patient he has been having 3-5 BM per day. Wife is unaware of how many BMs he's been having. Denies fever, chills, abdominal pain, watery stool, CP, productive cough.     PO intake has been normal per patient, he denies not having an appetite. Has been sober since march of this year, prior to that he has been sober for 18 months before relapsing.             Past Medical History:   Reviewed and edited as appropriate  Past Medical History:   Diagnosis Date     Ascites      Cirrhosis (H)      Esophageal varices (H)      Hepatitis      Hypertension      Left calcaneus fracture 1/9/2006 January 16, 2006: Fell 10 feet from ladder onto left foot on frozen ground on 1/9/06 at home.  Immediate pain and unable to walk- seen at [place] and diagnosed with calcaneus fracture     Portal vein thrombosis     left occlusion, partial main            Past Surgical History:   Reviewed and edited as appropriate   Past Surgical History:   Procedure Laterality Date     ANKLE SURGERY Left      COLONOSCOPY N/A 3/31/2016    Procedure: COLONOSCOPY;  Surgeon: Rhys Uriostegui MD;  Location:  GI     ESOPHAGOSCOPY, GASTROSCOPY, DUODENOSCOPY (EGD), COMBINED N/A 3/31/2016    Procedure: COMBINED ESOPHAGOSCOPY, GASTROSCOPY, DUODENOSCOPY (EGD);  Surgeon: Rhys Uriostegui MD;  Location:  GI     ESOPHAGOSCOPY, GASTROSCOPY, DUODENOSCOPY (EGD), COMBINED N/A 3/9/2018    Procedure: COMBINED ESOPHAGOSCOPY, GASTROSCOPY, DUODENOSCOPY (EGD), BIOPSY SINGLE OR MULTIPLE;  EGD;  Surgeon: Gonzalo Wahl MD;  Location:  GI     KNEE SURGERY Left      KNEE SURGERY Right      SIGMOIDOSCOPY FLEXIBLE N/A 10/31/2017    Procedure: SIGMOIDOSCOPY FLEXIBLE;;  Surgeon: Armaan Adams MD;  Location:  GI     TIPS Procedure  06/06/2018            Previous Endoscopy:     Results for orders placed or performed during the hospital  encounter of 08/04/16   COLONOSCOPY   Result Value Ref Range    COLONOSCOPY       [facility], [place]  [address] [place] [address] [phone]     Endoscopy Department  _______________________________________________________________________________  Patient Name: Ivan Bell           Procedure Date: 8/4/2016 12:48 PM  MRN: [id number]                       Account Number: [id number]  YOB: 1965              Admit Type: Outpatient  Age: 50                                Gender: Male  Note Status: Finalized                Attending MD: Rianna Be MD  _______________________________________________________________________________     Procedure:           Colonoscopy  Indications:         Personal history of colonic polyps  Providers:           Rianna Be MD, Royer Rogel RN  Referring MD:        Gonzalo Bales MD, Rosette Wills MD  Medicines:           Monitored Anesthesia Care  Complications:       No immediate complications.  _______________________________________________________________________________  Proced ure:           Pre-Anesthesia Assessment:                       - Prior to the procedure, a History and Physical was                        performed, and patient medications and allergies were                        reviewed. The patient is competent. The risks and                        benefits of the procedure and the sedation options and                        risks were discussed with the patient. All questions                        were answered and informed consent was obtained. Patient                        identification and proposed procedure were verified by                        the physician in the procedure room. Mental Status                        Examination: alert and oriented. Airway Examination:                        normal oropharyngeal airway and neck mobility.                        Respiratory Examination: clear to  auscultation. CV                        Examination: normal. Prophylactic Antibiotics: The                        patient does not require proph ylactic antibiotics. Prior                        Anticoagulants: The patient has taken no previous                        anticoagulant or antiplatelet agents. ASA Grade                        Assessment: II - A patient with mild systemic disease.                        After reviewing the risks and benefits, the patient was                        deemed in satisfactory condition to undergo the                        procedure. The anesthesia plan was to use monitored                        anesthesia care (MAC). Immediately prior to                        administration of medications, the patient was                        re-assessed for adequacy to receive sedatives. The heart                        rate, respiratory rate, oxygen saturations, blood                        pressure, adequacy of pulmonary ventilation, and                        response to care were monitored throughout the                        procedure. The physical status of the patient was                         re-assessed after the procedure.                       After obtaining informed consent, the colonoscope was                        passed under direct vision. Throughout the procedure,                        the patient's blood pressure, pulse, and oxygen                        saturations were monitored continuously. The Colonoscope                        was introduced through the anus and advanced to the                        cecum, identified by appendiceal orifice and ileocecal                        valve. The colonoscopy was performed without difficulty.                        The patient tolerated the procedure well.                                                                                   Findings:       A sessile polyp was found in the cecum. The polyp was 15 mm in size.  The        polyp was removed with a hot snare. Resection and retrieval were        complete. Verification of patient identification for the specimen was        done by the physician using the inez ent's name and medical record number.       A single small-mouthed diverticulum was found in the sigmoid colon.                                                                                   Impression:          - One 15 mm polyp in the cecum. Resected and retrieved.                       - Diverticulosis in the sigmoid colon.  Recommendation:      - Repeat colonoscopy in 1 year to review the polypectomy                        site.                                                                                     electronically signed by Rianna Be  ______________________  Rianna Be MD  2016 2:38 PM  I was physically present for the entire viewing portion of the exam.  __________________________  Signature of teaching physician  B4c/D4c  Number of Addenda: 0    Note Initiated On: 2016 12:48 PM              Social History:   Reviewed and edited as appropriate  Social History     Socioeconomic History     Marital status:      Spouse name: Not on file     Number of children: Not on file     Years of education: Not on file     Highest education level: Not on file   Occupational History     Not on file   Social Needs     Financial resource strain: Not on file     Food insecurity:     Worry: Not on file     Inability: Not on file     Transportation needs:     Medical: Not on file     Non-medical: Not on file   Tobacco Use     Smoking status: Former Smoker     Types: Dip, chew, snus or snuff     Last attempt to quit: 1998     Years since quittin.7     Smokeless tobacco: Current User     Last attempt to quit: 10/24/2017     Tobacco comment: 1 tin per 10 days.   Substance and Sexual Activity     Alcohol use: No     Alcohol/week: 10.5 oz     Types: 21 Cans of beer per week     Comment:  last etoh 2/14/16, did have Odouls ~8/2017     Drug use: No     Sexual activity: Not on file   Lifestyle     Physical activity:     Days per week: Not on file     Minutes per session: Not on file     Stress: Not on file   Relationships     Social connections:     Talks on phone: Not on file     Gets together: Not on file     Attends [employer] service: Not on file     Active member of club or organization: Not on file     Attends meetings of clubs or organizations: Not on file     Relationship status: Not on file     Intimate partner violence:     Fear of current or ex partner: Not on file     Emotionally abused: Not on file     Physically abused: Not on file     Forced sexual activity: Not on file   Other Topics Concern     Parent/sibling w/ CABG, MI or angioplasty before 65F 55M? Not Asked   Social History Narrative     Not on file            Family History:   Reviewed and edited as appropriate  No known history of gastrointestinal/liver disease or  gastrointestinal malignancies       Allergies:   Reviewed and edited as appropriate     Allergies   Allergen Reactions     Prednisone Visual Disturbance     Trazodone Visual Disturbance     Benadryl [Diphenhydramine] Other (See Comments)     Delirium (visual and auditory hallucinations)     Oxycodone Other (See Comments)     Delirium and constipation            Medications:     Medications Prior to Admission   Medication Sig Dispense Refill Last Dose     CONSTULOSE 10 GM/15ML solution TAKE 30MLS BY MOUTH THREE TIMES DAILY AS NEEDED FOR CONSTIPATION ( TAKE AS NEEDED TO MAINTAIN 3 TO 4 BOWEL MOVEMENTS DAILY) 1000 mL 11 6/4/2019 at Unknown time     furosemide (LASIX) 20 MG tablet Take 1 tablet (20 mg) by mouth daily 30 tablet 3 6/3/2019 at Unknown time     MULTIPLE VITAMINS PO Take 1 tablet by mouth daily   6/3/2019 at Unknown time     rifaximin (XIFAXAN) 550 MG TABS tablet Take 1 tablet (550 mg) by mouth 2 times daily 180 tablet 0 6/3/2019 at Unknown time     vitamin B1  "(THIAMINE) 100 MG tablet Take 1 tablet (100 mg) by mouth daily 30 tablet 0 6/3/2019 at Unknown time     vitamin D3 (CHOLECALCIFEROL) 2000 units (50 mcg) tablet Take 1 tablet by mouth daily   6/3/2019 at Unknown time     spironolactone (ALDACTONE) 50 MG tablet Take 2 tablets (100 mg) by mouth daily (Patient taking differently: Take 50 mg by mouth daily ) 60 tablet 1 Taking             Review of Systems:     A complete review of systems was performed and is negative except as noted in the HPI           Physical Exam:   /57   Pulse 98   Temp 98.6  F (37  C) (Oral)   Resp 18   Ht 1.778 m (5' 10\")   Wt 69 kg (152 lb 3.2 oz)   SpO2 100%   BMI 21.84 kg/m    Wt:   Wt Readings from Last 2 Encounters:   06/05/19 69 kg (152 lb 3.2 oz)   05/31/19 70.5 kg (155 lb 8 oz)      Constitutional: cooperative, pleasant, not dyspneic/diaphoretic, no acute distress  Eyes: Sclera icteric/injected  Ears/nose/mouth/throat: Normal oropharynx without ulcers or exudate, mucus membranes moist, hearing intact  Neck: supple, thyroid normal size  CV: No edema  Respiratory: Unlabored breathing  Lymph: No axillary, submandibular, supraclavicular or inguinal lymphadenopathy  Abdomen: Nondistended, +bs, no hepatosplenomegaly, nontender, no peritoneal signs  Skin: warm, perfused, no jaundice  Neuro: AAO x 3, No asterixis  Psych: Normal affect  MSK: Normal gait         Data:   Labs and imaging below were independently reviewed and interpreted    MELD-Na score: 26 at 6/5/2019  6:26 AM  MELD score: 26 at 6/5/2019  6:26 AM  Calculated from:  Serum Creatinine: 1.23 mg/dL at 6/5/2019  6:26 AM  Serum Sodium: 143 mmol/L (Rounded to 137 mmol/L) at 6/5/2019  6:26 AM  Total Bilirubin: 7.3 mg/dL at 6/5/2019  6:26 AM  INR(ratio): 2.45 at 6/5/2019  6:26 AM  Age: 53 years     BMP  Recent Labs   Lab 06/05/19  0626 06/05/19  0103 06/04/19  1841 06/04/19  1508 06/04/19  0716     --   --  143 139   POTASSIUM 3.4 2.9* 2.5* 2.7* 2.2*   CHLORIDE 120*  --   " --  115* 112*   JULISSA 8.8  --   --  9.5 9.7   CO2 14*  --   --  20 18*   BUN 10  --   --  12 12   CR 1.23  --   --  1.41* 1.37*   GLC 94  --   --  128* 115*     CBC  Recent Labs   Lab 06/05/19  0747 06/05/19  0626 06/04/19  0716   WBC 2.3* 2.5* 4.4   RBC 2.42* 2.49* 3.20*   HGB 7.5* 7.7* 9.8*   HCT 24.2* 24.2* 30.2*    97 94   MCH 31.0 30.9 30.6   MCHC 31.0* 31.8 32.5   RDW 17.7* 17.7* 17.4*   PLT 53* 52* 85*     INR  Recent Labs   Lab 06/05/19  0626 06/04/19  0716   INR 2.45* 1.92*     LFTs  Recent Labs   Lab 06/05/19  0626 06/04/19  0716   ALKPHOS 131 198*   AST 34 56*   ALT 13 17   BILITOTAL 7.3* 9.2*   PROTTOTAL 5.3* 6.6*   ALBUMIN 3.0* 3.1*      Abdomen XR  IMPRESSION:   1. Nonobstructive bowel gas pattern. Moderate colonic stool burden.  2. Multiple bilateral renal collecting system stones.  Abdomen US  Impression:   1. Patent TIPS with no substantial change in stent velocities.  Continued expected retrograde flow in the right portal vein. Left  portal vein is not well seen.  2. Nodular thickened gallbladder wall with cholelithiasis and  gallbladder sludge. This is nonspecific in the setting of liver  disease, although is somewhat more nodular in appearance than  expected. Cannot exclude cholecystitis in the proper clinical setting.  3. Mild right hydronephrosis and bilateral renal stones.  4. Redemonstration of cirrhotic hepatic morphology. Moderate ascites.     Teresa Vallejo MD  Internal Medicine-PGY2  Hepatology service  ATTENDING NOTE, GASTROENTEROLOGY/HEPATOLOGY    I saw and discussed this patient with the fellow and participated in the decision making. I agree with the fellow's note. Rianna Be MD

## 2019-06-05 NOTE — CONSULTS
Please see the consult note from earlier date by Dr. Yoni Kaba for details on assessment and recommendations.  This note has only been written to satisfy the electronic consult order.     ATTENDING NOTE, GASTROENTEROLOGY/HEPATOLOGY    I saw and discussed this patient with the fellow and participated in the decision making. I agree with the fellow's note. Rianna Be MD

## 2019-06-06 ENCOUNTER — APPOINTMENT (OUTPATIENT)
Dept: ULTRASOUND IMAGING | Facility: CLINIC | Age: 54
End: 2019-06-06
Attending: INTERNAL MEDICINE
Payer: COMMERCIAL

## 2019-06-06 ENCOUNTER — APPOINTMENT (OUTPATIENT)
Dept: OCCUPATIONAL THERAPY | Facility: CLINIC | Age: 54
End: 2019-06-06
Payer: COMMERCIAL

## 2019-06-06 LAB
ABO + RH BLD: NORMAL
ABO + RH BLD: NORMAL
ALBUMIN SERPL-MCNC: 3.4 G/DL (ref 3.4–5)
ALBUMIN UR-MCNC: NEGATIVE MG/DL
ALP SERPL-CCNC: 111 U/L (ref 40–150)
ALT SERPL W P-5'-P-CCNC: 14 U/L (ref 0–70)
ANION GAP SERPL CALCULATED.3IONS-SCNC: 9 MMOL/L (ref 3–14)
APPEARANCE UR: CLEAR
AST SERPL W P-5'-P-CCNC: 38 U/L (ref 0–45)
BILIRUB SERPL-MCNC: 6.8 MG/DL (ref 0.2–1.3)
BILIRUB UR QL STRIP: NEGATIVE
BLD GP AB SCN SERPL QL: NORMAL
BLD PROD TYP BPU: NORMAL
BLD PROD TYP BPU: NORMAL
BLD UNIT ID BPU: 0
BLOOD BANK CMNT PATIENT-IMP: NORMAL
BLOOD PRODUCT CODE: NORMAL
BPU ID: NORMAL
BUN SERPL-MCNC: 7 MG/DL (ref 7–30)
CALCIUM SERPL-MCNC: 8.8 MG/DL (ref 8.5–10.1)
CHLORIDE SERPL-SCNC: 120 MMOL/L (ref 94–109)
CO2 SERPL-SCNC: 14 MMOL/L (ref 20–32)
COLOR UR AUTO: YELLOW
CREAT SERPL-MCNC: 1.27 MG/DL (ref 0.66–1.25)
ERYTHROCYTE [DISTWIDTH] IN BLOOD BY AUTOMATED COUNT: 17.8 % (ref 10–15)
ETHYL GLUCURONIDE UR QL: NEGATIVE
GFR SERPL CREATININE-BSD FRML MDRD: 63 ML/MIN/{1.73_M2}
GLUCOSE SERPL-MCNC: 81 MG/DL (ref 70–99)
GLUCOSE UR STRIP-MCNC: NEGATIVE MG/DL
HCT VFR BLD AUTO: 22 % (ref 40–53)
HGB BLD-MCNC: 7 G/DL (ref 13.3–17.7)
HGB UR QL STRIP: ABNORMAL
INR PPP: 2.87 (ref 0.86–1.14)
KETONES UR STRIP-MCNC: NEGATIVE MG/DL
LEUKOCYTE ESTERASE UR QL STRIP: ABNORMAL
MAGNESIUM SERPL-MCNC: 1.8 MG/DL (ref 1.6–2.3)
MCH RBC QN AUTO: 31 PG (ref 26.5–33)
MCHC RBC AUTO-ENTMCNC: 31.8 G/DL (ref 31.5–36.5)
MCV RBC AUTO: 97 FL (ref 78–100)
MUCOUS THREADS #/AREA URNS LPF: PRESENT /LPF
NITRATE UR QL: NEGATIVE
NUM BPU REQUESTED: 1
PH UR STRIP: 6.5 PH (ref 5–7)
PHOSPHATE SERPL-MCNC: 2.2 MG/DL (ref 2.5–4.5)
PLATELET # BLD AUTO: 57 10E9/L (ref 150–450)
POTASSIUM SERPL-SCNC: 3.3 MMOL/L (ref 3.4–5.3)
POTASSIUM SERPL-SCNC: 3.9 MMOL/L (ref 3.4–5.3)
PROT SERPL-MCNC: 5.3 G/DL (ref 6.8–8.8)
RBC # BLD AUTO: 2.26 10E12/L (ref 4.4–5.9)
RBC #/AREA URNS AUTO: 68 /HPF (ref 0–2)
SODIUM SERPL-SCNC: 143 MMOL/L (ref 133–144)
SOURCE: ABNORMAL
SP GR UR STRIP: 1.01 (ref 1–1.03)
SPECIMEN EXP DATE BLD: NORMAL
SQUAMOUS #/AREA URNS AUTO: <1 /HPF (ref 0–1)
TRANS CELLS #/AREA URNS HPF: <1 /HPF (ref 0–1)
TRANSFUSION STATUS PATIENT QL: NORMAL
TRANSFUSION STATUS PATIENT QL: NORMAL
UROBILINOGEN UR STRIP-MCNC: NORMAL MG/DL (ref 0–2)
WBC # BLD AUTO: 2.2 10E9/L (ref 4–11)
WBC #/AREA URNS AUTO: 19 /HPF (ref 0–5)

## 2019-06-06 PROCEDURE — 97535 SELF CARE MNGMENT TRAINING: CPT | Mod: GO | Performed by: OCCUPATIONAL THERAPIST

## 2019-06-06 PROCEDURE — 83735 ASSAY OF MAGNESIUM: CPT | Performed by: INTERNAL MEDICINE

## 2019-06-06 PROCEDURE — 87086 URINE CULTURE/COLONY COUNT: CPT | Performed by: INTERNAL MEDICINE

## 2019-06-06 PROCEDURE — 40000141 ZZH STATISTIC PERIPHERAL IV START W/O US GUIDANCE

## 2019-06-06 PROCEDURE — 81001 URINALYSIS AUTO W/SCOPE: CPT | Performed by: INTERNAL MEDICINE

## 2019-06-06 PROCEDURE — 84132 ASSAY OF SERUM POTASSIUM: CPT | Performed by: INTERNAL MEDICINE

## 2019-06-06 PROCEDURE — 85027 COMPLETE CBC AUTOMATED: CPT | Performed by: INTERNAL MEDICINE

## 2019-06-06 PROCEDURE — 86850 RBC ANTIBODY SCREEN: CPT | Performed by: INTERNAL MEDICINE

## 2019-06-06 PROCEDURE — 25000125 ZZHC RX 250: Performed by: INTERNAL MEDICINE

## 2019-06-06 PROCEDURE — 80053 COMPREHEN METABOLIC PANEL: CPT | Performed by: INTERNAL MEDICINE

## 2019-06-06 PROCEDURE — 85610 PROTHROMBIN TIME: CPT | Performed by: INTERNAL MEDICINE

## 2019-06-06 PROCEDURE — 12000012 ZZH R&B MS OVERFLOW UMMC

## 2019-06-06 PROCEDURE — 36415 COLL VENOUS BLD VENIPUNCTURE: CPT | Performed by: INTERNAL MEDICINE

## 2019-06-06 PROCEDURE — 99233 SBSQ HOSP IP/OBS HIGH 50: CPT | Performed by: INTERNAL MEDICINE

## 2019-06-06 PROCEDURE — 86923 COMPATIBILITY TEST ELECTRIC: CPT | Performed by: INTERNAL MEDICINE

## 2019-06-06 PROCEDURE — 86901 BLOOD TYPING SEROLOGIC RH(D): CPT | Performed by: INTERNAL MEDICINE

## 2019-06-06 PROCEDURE — 25000132 ZZH RX MED GY IP 250 OP 250 PS 637: Performed by: INTERNAL MEDICINE

## 2019-06-06 PROCEDURE — 76770 US EXAM ABDO BACK WALL COMP: CPT

## 2019-06-06 PROCEDURE — 86900 BLOOD TYPING SEROLOGIC ABO: CPT | Performed by: INTERNAL MEDICINE

## 2019-06-06 PROCEDURE — 25800030 ZZH RX IP 258 OP 636: Performed by: INTERNAL MEDICINE

## 2019-06-06 PROCEDURE — P9016 RBC LEUKOCYTES REDUCED: HCPCS | Performed by: INTERNAL MEDICINE

## 2019-06-06 PROCEDURE — 25000132 ZZH RX MED GY IP 250 OP 250 PS 637: Performed by: PHYSICIAN ASSISTANT

## 2019-06-06 PROCEDURE — 84100 ASSAY OF PHOSPHORUS: CPT | Performed by: INTERNAL MEDICINE

## 2019-06-06 RX ADMIN — FUROSEMIDE 20 MG: 20 TABLET ORAL at 08:19

## 2019-06-06 RX ADMIN — PANTOPRAZOLE SODIUM 40 MG: 40 TABLET, DELAYED RELEASE ORAL at 08:19

## 2019-06-06 RX ADMIN — POTASSIUM CHLORIDE 20 MEQ: 750 TABLET, EXTENDED RELEASE ORAL at 10:13

## 2019-06-06 RX ADMIN — LACTULOSE 20 G: 20 SOLUTION ORAL at 22:47

## 2019-06-06 RX ADMIN — SPIRONOLACTONE 100 MG: 100 TABLET, FILM COATED ORAL at 08:19

## 2019-06-06 RX ADMIN — POTASSIUM PHOSPHATE, MONOBASIC AND POTASSIUM PHOSPHATE, DIBASIC 15 MMOL: 224; 236 INJECTION, SOLUTION INTRAVENOUS at 13:49

## 2019-06-06 RX ADMIN — LACTULOSE 20 G: 20 SOLUTION ORAL at 00:30

## 2019-06-06 RX ADMIN — LACTULOSE 20 G: 20 SOLUTION ORAL at 08:18

## 2019-06-06 RX ADMIN — THERA TABS 1 TABLET: TAB at 08:19

## 2019-06-06 RX ADMIN — Medication 100 MG: at 08:19

## 2019-06-06 RX ADMIN — LACTULOSE 20 G: 20 SOLUTION ORAL at 12:00

## 2019-06-06 RX ADMIN — FOLIC ACID 1 MG: 1 TABLET ORAL at 08:19

## 2019-06-06 RX ADMIN — POTASSIUM CHLORIDE 40 MEQ: 750 TABLET, EXTENDED RELEASE ORAL at 08:18

## 2019-06-06 RX ADMIN — LACTULOSE 20 G: 20 SOLUTION ORAL at 10:13

## 2019-06-06 RX ADMIN — RIFAXIMIN 550 MG: 550 TABLET ORAL at 08:19

## 2019-06-06 RX ADMIN — RIFAXIMIN 550 MG: 550 TABLET ORAL at 19:48

## 2019-06-06 ASSESSMENT — ACTIVITIES OF DAILY LIVING (ADL)
ADLS_ACUITY_SCORE: 13

## 2019-06-06 NOTE — PLAN OF CARE
RN [id number]:  Disoriented to time; [facility] protocol stage I. Lactulose administered per PRN orders with 4 stools. AVSS on RA. Denies pain and nausea. Tolerating regular diet with good appetite. PIV SL'd. K+ 3.3; 60 mEq PO administered. Recheck pending collection. Phos 2.2; 15 mmol IV administered. Recheck in AM. Hgb 7.0 (trending downward, transfusion indicated and likely in setting of bleed)- 1U RBC's administered without s/s of rxn. Plan for scope tomorrow to evaluate possible source of bleed (NPO at 0000). Bed alarm activated for safety. Up with SBA. Wife, Elicia included in plan of care and would like to be updated in early AM. Will continue with plan of care.

## 2019-06-06 NOTE — PLAN OF CARE
"7A OT  Discharge Planner OT   Patient plan for discharge: Home  Current status: Engaged pt in functional cognition activities and pathfinding activity. Pt requires verbal prompting and guided questioning to facilitate problem solving during pathfinding activity. Pt located 3/3 locations, however, required verbal cues. Pt demonstrates inability to follow multistep commands during pathfinding task. Pt correctly set up 2/3 medications, required verbal prompting for prn meds. Facilitated discussion regarding MoCA assessment, pt reports assessment went \"alright\" indicating significant lack of insight regarding cognitive and memory deficits.   Barriers to return to prior living situation: cognition, acute medical status  Recommendations for discharge: home with OP OT, 24/7 assist  Rationale for recommendations: Pt demonstrates lack of insight to cognitive deficits. Would benefit from continued skilled intervention to address compensatory strategies for deficits.       Entered by: Cassy Taylor 06/06/2019 3:56 PM       "

## 2019-06-06 NOTE — PROGRESS NOTES
"[facility]    Hepatology Follow-up    CC: Altered mental status    Dx: Hepatic encephalopathy                  Alcoholic cirrhosis s/p TIPS-decompensated                  Acute on chronic anemia    24 hour events: Had 4 BM. No acute events overnight. Hemoglobin dropped from 7.2 to 7.0. No melena and hemodynamically stable.     Subjective:    Patient denies fevers, sweats or chills. Appetite has been good.     Medications  Current Facility-Administered Medications   Medication Dose Route Frequency     folic acid  1 mg Oral Daily     furosemide  20 mg Oral Daily     multivitamin, therapeutic  1 tablet Oral Daily     pantoprazole  40 mg Oral QAM AC     rifaximin  550 mg Oral BID     spironolactone  100 mg Oral Daily     vitamin B1  100 mg Oral Daily       Review of systems  A 10-point review of systems was negative, unless stated above    Examination  /66   Pulse 98   Temp 98.4  F (36.9  C) (Oral)   Resp 18   Ht 1.778 m (5' 10\")   Wt 69 kg (152 lb 3.2 oz)   SpO2 100%   BMI 21.84 kg/m      Intake/Output Summary (Last 24 hours) at 6/6/2019 1106  Last data filed at 6/5/2019 2030  Gross per 24 hour   Intake 240 ml   Output --   Net 240 ml       General: Looks well, NAD, normal color  CVS: RRR  Resp: CTA  Abdomen: soft, non tender, non distended, no masses, no guarding.   Extremities:  Neuro: AAO X 3, no asterixis noted today  Skin: no rash  Psych: normal mood  Lymph: no LAD    Laboratory    Hgb: 7.0  K: 3.3  Albumin: 3.4  MELD-Na score: 28 at 6/6/2019  6:09 AM  MELD score: 28 at 6/6/2019  6:09 AM  Calculated from:  Serum Creatinine: 1.27 mg/dL at 6/6/2019  6:09 AM  Serum Sodium: 143 mmol/L (Rounded to 137 mmol/L) at 6/6/2019  6:09 AM  Total Bilirubin: 6.8 mg/dL at 6/6/2019  6:09 AM  INR(ratio): 2.87 at 6/6/2019  6:09 AM  Age: 53 years       Assessment  53 year old male with a medical history significant for alcoholic cirrhosis with EV s/p TIPS, alcohol use disorder with recent " relapse resulting in alcoholic hepatitis in 3/19, who presented to the ED with hepatic encephalopathy, being seen by the hepatology service for a acute on chronic anemia concerning for GI bleed.     Hgb has dropped from 9.8 on admission to 7.0 this morning, has remained HDS without evidence of melena. He has a history of EV but had a TIPS procedure last year; suspect patient is slowly oozing from superficial lesions like GAVE vs worsening portal hypertension. Plan is to scope tomorrow to further assess.     Regarding his HE, he is back to baseline after having several BM in the past 24 hours. He no longer has asterixis. No concern for infection either.      Recommendations  - NPO at midnight for EGD tomorrow  - ok to keep off octreotide, ceftriaxone and iv PPI   - continue PTA lactulose and rifaximin        Thank you for involving us in this patient's care. Please do not hesitate to contact the GI service with any questions or concerns. Hepatology service will continue to follow.      Patient care plan discussed with Dr. Bales, GI staff physician.      Teresa Vallejo MD  Internal Medicine-PGY2

## 2019-06-06 NOTE — PLAN OF CARE
"/55 (BP Location: Right arm)   Pulse 98   Temp 99  F (37.2  C) (Oral)   Resp 18   Ht 1.778 m (5' 10\")   Wt 69 kg (152 lb 3.2 oz)   SpO2 100%   BMI 21.84 kg/m      Vitals:  T-max: 99.0 oral, OVSS, on RA.  Neuro: Pt. disoriented to time. [place] score: stage 1 & received PRN Lactulose. Bed alarm on for pt's safety.   Activity: Pt. up with SBA.   LDAs: Right and left PIV saline locked.   Respiratory: LS clear bilat.   GI/: Pt. voided spontaneously. Pt. reported 2 loose stools this shift.   Skin: Intact & WNL  Pain: Pt. denies pain or nausea.   Diet: Regular diet.  Labs/Imaging: Phos 2.0 & received K+ Phos 15mmol IV x1. Next phos. level with morning labs.  Plan: Repeat renal ultrasound today & possible discharge home. Continue to follow POC & notify team with change in status.       "

## 2019-06-06 NOTE — PROGRESS NOTES
[facility], [facility] Progress Note - Hospitalist Service, Gold 8       Date of Admission:  6/4/2019  Assessment & Plan   Ivan Bell is a 53 year old male with a hx of alcohol cirrhosis (c/b ascites s/p TIPS but requiring crissy, HE, EV), alcohol use disorder, recent AMBER and type II RTA, and non severe malnutrition in setting of acute on chronic illness who is being admitted for hepatic encephalopathy.    Changes today: Hemoglobin dropped to 7; got blood transfusion and will plan for endoscopy in the morning. Repeat US kidney shows unchanged mild hydronephrosis. Sending urine culture.     Encephalopathy - likely HE due to hypokalemia, possible GI bleed  Admitted with increased confusion x 1d, unclear if he was compliant with lactulose, but had only 1 BM day prior to admission. He reports overall poor po intake recently, and has been taking his diuretics as prescribed. UOP has been normal. Denies recent ETOH use. No known head injury. Ammonia 62. CXR neg. No obvious source of infection, no significant ascites to tap. Positive asterixis. No focal neuro deficits, strength symmetric in all extremities. PERRLA.    - [facility] protocol with lactulose  - Replace K as below  - Follow UA/UC and blood cultures  - Start thiamine, folic acid  - PT/OT consults    Acute normocytic anemia: Endoscopy tomorrow to look for gastritis, ulcer, vs varices. No need for octreotide/ceftriaxone currently. NPO at midnight.     Alcohol cirrhosis c/b ascites, HE, EV  Hx of ascites s/p TIPS placement 6/6/18, on lasix + aldactone pta, last paracentesis 5/31/19 with 1900cc removed. Hx of HE on lactulose. Hx of variceal bleed in 2017, last EGD 4/2018 with med EV with banding x 4, mild portal hypertensive gastropathy. US 4/13/19 showed cirrhotic liver with e/o portal HTN with mod/large ascites, BL renal stones, improvement in mid stent velocities of the TIPS. Recent admission for alcoholic hepatitis in 3/2019  requiring steroids. MELD Na 25.   - Cont protonix 40mg daily  - Cont rifaximin 550mg BID  - Increase spironolactone to 100mg daily (was taking 50mg daily), restart furosemide  - MELD labs daily     AMBER  Likely type II RTA  Non anion gap metabolic acidosis  Right mild hydronephrosis  BL Cr ~1-1.1. Recent admission, had RTA felt 2/2 acute tubular necrosis from transient ischemia after pre-renal AMBER, which required albumin + aggressive K repletion + sodium bicarb.  It is likely that patient has RTA again, bicarb 18, AG 7, Cr 1.37, BUN 12, K 2.2.   - Nephrology consulted; will discuss hydronephrosis with them. Urine culture pending  - Check urine K and urine Na  - BMP daily  - Replace K as below     Hypokalemia. Has been recurrent issue in setting of RTA. K 2.2. S/p replacement in ED and replacement protocol on floor. Check potassium daily     Alcohol use disorder. Was sober x 18months, but relapsed around ~1/2019. Admitted with alcoholic hepatitis 3/2019. Cont to encourage cessation. Will consider CD consult once appropriate.      Thrombocytopenia. BL Plt ~40-80. Likely 2/2 ESLD. No s/sx of bleeding. Monitor.      Non severe malnutrition in setting of acute on chronic illness. Cont MVI, folic acid, and thiamine. Nutrition consult.     Diet: Regular Diet Adult  Snacks/Supplements Adult: Other; see cmnts; Between Meals  NPO per Anesthesia Guidelines for Procedure/Surgery Except for: Meds    DVT Prophylaxis: Low Risk/Ambulatory with no VTE prophylaxis indicated  Blue Catheter: not present  Code Status: Full Code      Disposition Plan   Expected discharge: Tomorrow, recommended to prior living arrangement once endoscopy performed, potassium stable  Entered: Tian Perez MD 06/06/2019, 6:51 PM       The patient's care was discussed with the Bedside Nurse, Patient, Patient's Family and gastroenterology Consultant.    Tian Perez MD  Hospitalist Service, [address],  [place]  Pager: [phone]  Please see sticky note for cross cover information  ______________________________________________________________________    Interval History   No acute events overnight. No bloody stools, no blood loss, no black stools. Mind feels stable. No fever/chills. Urinating normally.    Data reviewed today: I reviewed all medications, new labs and imaging results over the last 24 hours.    Physical Exam   Vital Signs: Temp: 97.9  F (36.6  C) Temp src: Oral BP: 126/71   Heart Rate: 78 Resp: 17 SpO2: 100 % O2 Device: None (Room air)    Weight: 152 lbs 3.2 oz  Constitutional: awake, alert, cooperative, no apparent distress, and appears stated age  Respiratory: No increased work of breathing, good air exchange, clear to auscultation bilaterally, no crackles or wheezing  Cardiovascular: Normal apical impulse, regular rate and rhythm, normal S1 and S2, no S3 or S4, and no murmur noted  GI: Normal bowel sounds, slightly distended, no tenderness on palpation  Neuropsychiatric: Asterixis present but decreased, oriented to person, place, and time

## 2019-06-07 VITALS
HEIGHT: 70 IN | BODY MASS INDEX: 21.79 KG/M2 | TEMPERATURE: 97.9 F | HEART RATE: 78 BPM | WEIGHT: 152.2 LBS | SYSTOLIC BLOOD PRESSURE: 125 MMHG | RESPIRATION RATE: 20 BRPM | DIASTOLIC BLOOD PRESSURE: 69 MMHG | OXYGEN SATURATION: 100 %

## 2019-06-07 LAB
ALBUMIN SERPL-MCNC: 3.5 G/DL (ref 3.4–5)
ALP SERPL-CCNC: 127 U/L (ref 40–150)
ALT SERPL W P-5'-P-CCNC: 14 U/L (ref 0–70)
ANION GAP SERPL CALCULATED.3IONS-SCNC: 9 MMOL/L (ref 3–14)
AST SERPL W P-5'-P-CCNC: 37 U/L (ref 0–45)
BACTERIA SPEC CULT: NO GROWTH
BILIRUB SERPL-MCNC: 8 MG/DL (ref 0.2–1.3)
BUN SERPL-MCNC: 9 MG/DL (ref 7–30)
CALCIUM SERPL-MCNC: 9.1 MG/DL (ref 8.5–10.1)
CHLORIDE SERPL-SCNC: 120 MMOL/L (ref 94–109)
CO2 SERPL-SCNC: 15 MMOL/L (ref 20–32)
CREAT SERPL-MCNC: 1.18 MG/DL (ref 0.66–1.25)
ERYTHROCYTE [DISTWIDTH] IN BLOOD BY AUTOMATED COUNT: 18.2 % (ref 10–15)
GFR SERPL CREATININE-BSD FRML MDRD: 69 ML/MIN/{1.73_M2}
GLUCOSE SERPL-MCNC: 93 MG/DL (ref 70–99)
HCT VFR BLD AUTO: 26.6 % (ref 40–53)
HGB BLD-MCNC: 8.6 G/DL (ref 13.3–17.7)
INR PPP: 2.58 (ref 0.86–1.14)
LACTATE BLD-SCNC: 1.2 MMOL/L (ref 0.7–2)
Lab: NORMAL
MAGNESIUM SERPL-MCNC: 2 MG/DL (ref 1.6–2.3)
MCH RBC QN AUTO: 30.9 PG (ref 26.5–33)
MCHC RBC AUTO-ENTMCNC: 32.3 G/DL (ref 31.5–36.5)
MCV RBC AUTO: 96 FL (ref 78–100)
PHOSPHATE SERPL-MCNC: 2.1 MG/DL (ref 2.5–4.5)
PLATELET # BLD AUTO: 55 10E9/L (ref 150–450)
POTASSIUM SERPL-SCNC: 3.4 MMOL/L (ref 3.4–5.3)
PROT SERPL-MCNC: 5.7 G/DL (ref 6.8–8.8)
RBC # BLD AUTO: 2.78 10E12/L (ref 4.4–5.9)
SODIUM SERPL-SCNC: 144 MMOL/L (ref 133–144)
SPECIMEN SOURCE: NORMAL
UPPER GI ENDOSCOPY: NORMAL
WBC # BLD AUTO: 2.7 10E9/L (ref 4–11)

## 2019-06-07 PROCEDURE — 83605 ASSAY OF LACTIC ACID: CPT | Performed by: INTERNAL MEDICINE

## 2019-06-07 PROCEDURE — 99239 HOSP IP/OBS DSCHRG MGMT >30: CPT | Performed by: INTERNAL MEDICINE

## 2019-06-07 PROCEDURE — 25000125 ZZHC RX 250: Performed by: INTERNAL MEDICINE

## 2019-06-07 PROCEDURE — 85610 PROTHROMBIN TIME: CPT | Performed by: INTERNAL MEDICINE

## 2019-06-07 PROCEDURE — 25000132 ZZH RX MED GY IP 250 OP 250 PS 637: Performed by: PHYSICIAN ASSISTANT

## 2019-06-07 PROCEDURE — 80053 COMPREHEN METABOLIC PANEL: CPT | Performed by: INTERNAL MEDICINE

## 2019-06-07 PROCEDURE — 85027 COMPLETE CBC AUTOMATED: CPT | Performed by: INTERNAL MEDICINE

## 2019-06-07 PROCEDURE — 25000128 H RX IP 250 OP 636: Performed by: INTERNAL MEDICINE

## 2019-06-07 PROCEDURE — 0DJ08ZZ INSPECTION OF UPPER INTESTINAL TRACT, VIA NATURAL OR ARTIFICIAL OPENING ENDOSCOPIC: ICD-10-PCS | Performed by: INTERNAL MEDICINE

## 2019-06-07 PROCEDURE — 43235 EGD DIAGNOSTIC BRUSH WASH: CPT | Performed by: INTERNAL MEDICINE

## 2019-06-07 PROCEDURE — 99152 MOD SED SAME PHYS/QHP 5/>YRS: CPT | Performed by: INTERNAL MEDICINE

## 2019-06-07 PROCEDURE — 83735 ASSAY OF MAGNESIUM: CPT | Performed by: INTERNAL MEDICINE

## 2019-06-07 PROCEDURE — 84100 ASSAY OF PHOSPHORUS: CPT | Performed by: INTERNAL MEDICINE

## 2019-06-07 PROCEDURE — 36415 COLL VENOUS BLD VENIPUNCTURE: CPT | Performed by: INTERNAL MEDICINE

## 2019-06-07 PROCEDURE — 25000132 ZZH RX MED GY IP 250 OP 250 PS 637: Performed by: INTERNAL MEDICINE

## 2019-06-07 RX ORDER — SODIUM BICARBONATE 650 MG/1
650 TABLET ORAL 3 TIMES DAILY
Qty: 90 TABLET | Refills: 1 | Status: SHIPPED | OUTPATIENT
Start: 2019-06-07 | End: 2019-08-02

## 2019-06-07 RX ORDER — SODIUM BICARBONATE 650 MG/1
650 TABLET ORAL 3 TIMES DAILY
Qty: 90 TABLET | Refills: 0 | Status: SHIPPED | OUTPATIENT
Start: 2019-06-07 | End: 2019-06-07

## 2019-06-07 RX ORDER — POTASSIUM CHLORIDE 1500 MG/1
20 TABLET, EXTENDED RELEASE ORAL DAILY
Qty: 30 TABLET | Refills: 1 | Status: SHIPPED | OUTPATIENT
Start: 2019-06-07 | End: 2019-06-07

## 2019-06-07 RX ORDER — SODIUM BICARBONATE 650 MG/1
650 TABLET ORAL 3 TIMES DAILY
Status: DISCONTINUED | OUTPATIENT
Start: 2019-06-07 | End: 2019-06-07 | Stop reason: HOSPADM

## 2019-06-07 RX ORDER — SPIRONOLACTONE 100 MG/1
150 TABLET, FILM COATED ORAL DAILY
Qty: 45 TABLET | Refills: 1 | Status: SHIPPED | OUTPATIENT
Start: 2019-06-07 | End: 2019-08-19

## 2019-06-07 RX ORDER — SPIRONOLACTONE 100 MG/1
150 TABLET, FILM COATED ORAL DAILY
Qty: 45 TABLET | Refills: 1 | Status: SHIPPED | OUTPATIENT
Start: 2019-06-07 | End: 2019-06-07

## 2019-06-07 RX ORDER — FENTANYL CITRATE 50 UG/ML
INJECTION, SOLUTION INTRAMUSCULAR; INTRAVENOUS PRN
Status: DISCONTINUED | OUTPATIENT
Start: 2019-06-07 | End: 2019-06-07 | Stop reason: HOSPADM

## 2019-06-07 RX ORDER — PANTOPRAZOLE SODIUM 40 MG/1
40 TABLET, DELAYED RELEASE ORAL
Qty: 30 TABLET | Refills: 1 | Status: SHIPPED | OUTPATIENT
Start: 2019-06-08 | End: 2019-06-07

## 2019-06-07 RX ORDER — FOLIC ACID 1 MG/1
1 TABLET ORAL DAILY
Qty: 30 TABLET | Refills: 1 | Status: SHIPPED | OUTPATIENT
Start: 2019-06-07 | End: 2019-08-30

## 2019-06-07 RX ORDER — POTASSIUM CHLORIDE 1500 MG/1
20 TABLET, EXTENDED RELEASE ORAL DAILY
Qty: 30 TABLET | Refills: 1 | Status: SHIPPED | OUTPATIENT
Start: 2019-06-07 | End: 2019-06-17

## 2019-06-07 RX ORDER — PANTOPRAZOLE SODIUM 40 MG/1
40 TABLET, DELAYED RELEASE ORAL
Qty: 30 TABLET | Refills: 1 | Status: SHIPPED | OUTPATIENT
Start: 2019-06-08 | End: 2019-08-23

## 2019-06-07 RX ADMIN — LACTULOSE 20 G: 20 SOLUTION ORAL at 01:38

## 2019-06-07 RX ADMIN — Medication 100 MG: at 07:42

## 2019-06-07 RX ADMIN — PANTOPRAZOLE SODIUM 40 MG: 40 TABLET, DELAYED RELEASE ORAL at 07:42

## 2019-06-07 RX ADMIN — SPIRONOLACTONE 100 MG: 100 TABLET, FILM COATED ORAL at 07:42

## 2019-06-07 RX ADMIN — FOLIC ACID 1 MG: 1 TABLET ORAL at 07:42

## 2019-06-07 RX ADMIN — LACTULOSE 20 G: 20 SOLUTION ORAL at 03:54

## 2019-06-07 RX ADMIN — THERA TABS 1 TABLET: TAB at 07:42

## 2019-06-07 RX ADMIN — FUROSEMIDE 20 MG: 20 TABLET ORAL at 07:42

## 2019-06-07 RX ADMIN — RIFAXIMIN 550 MG: 550 TABLET ORAL at 07:42

## 2019-06-07 ASSESSMENT — ACTIVITIES OF DAILY LIVING (ADL)
ADLS_ACUITY_SCORE: 12
ADLS_ACUITY_SCORE: 13
ADLS_ACUITY_SCORE: 13
ADLS_ACUITY_SCORE: 12

## 2019-06-07 NOTE — PLAN OF CARE
DISCHARGE:  Patient with orders to discharge to his home.     Education Provided:   Med Card n/a  Lab Book print out labs for today an yesterday  Handouts discharge instruction  Specialty Pharmacy n/a  LDAs PIV removed prior to discharge    Discharge instructions, medications & follow ups reviewed with pt and his wife. Copy of discharge summary given to pt. PIV removed. Pt left the floor accompanied by his wife

## 2019-06-07 NOTE — PROGRESS NOTES
Today potassium level low normal, we continue with potassium supplementation, consider Spironolactone 100 mg BID and Sodium bicarbonate 650 mg TID with meals.   We will sign off the case for now, please do not hesitate to call or re-consult for any further question.     Urmila Richey M.D.  Nephrology Fellow  Pager: [phone]

## 2019-06-07 NOTE — PLAN OF CARE
"/66 (BP Location: Right arm)   Pulse 98   Temp 98.6  F (37  C) (Oral)   Resp 18   Ht 1.778 m (5' 10\")   Wt 69 kg (152 lb 3.2 oz)   SpO2 100%   BMI 21.84 kg/m      Shift: 8960-8514  Neuro: A/Ox4, mentation improving. PRN lactulose given x 3 per [facility] protocol.   Cardiac: VSS. No s/sx of bleeding noted.   Respiratory: Maintains sats on RA.   GI/: 2 BMs overnight, pt also voiding when up to bathroom and not saving.   Diet/Appetite: NPO at midnight. Denies nausea.   Activity: Up with SBA to bathroom.   Pain: Denies pain.   Skin: No new deficits noted.   LDA's: JERRY, SL.    Plan: Plan for scope today. Will continue to monitor per POC.     "

## 2019-06-07 NOTE — PROGRESS NOTES
Gastroenterology Endoscopy Suite Brief Operative Note    Procedure:  Upper endoscopy   Post-operative diagnosis:  Negative EGD   Staff Physician:  Dr. Gonzalo Miramontes   Fellow/Assistant(s):  Isis Menendez     Specimens:  Please see final procedure note for further details.   Findings:  Diminutive varices, mild PHG, normal duodenum   Complications:  None.   Condition:  Stable   Recommendations  Diet:  ADAT  PPI:  PPI po once daily  Anti-coagulants/platelets:  N/A  Octreotide:  Stop octreotide drip  Discharge Planning:   Okay to discharge as no HE, if all else stable  Recommend iron supplementation  Follow up with hepatology as prior planned

## 2019-06-07 NOTE — OR NURSING
Procedure: EGD  Sedation: Conscious sedation (100 mg fentanyl, 2 mcg versed)  O2: 2 LPM NC  Tolerated: Well. VS stable during and post procedure. ETCO2 monitored continuously. Not c/o abdominal or chest pain  Report: Given to 7A RN  Pt to recovery area in stable condition, accompanied by RN    Sue French RN

## 2019-06-07 NOTE — PLAN OF CARE
Occupational Therapy Discharge Summary    Reason for therapy discharge:    Discharged to home with outpatient therapy.    Progress towards therapy goal(s). See goals on [facility] Plan [facility] electronic health record for goal details.  Goals partially met.  Barriers to achieving goals:   discharge from facility.    Therapy recommendation(s):    Continued therapy is recommended.  Rationale/Recommendations:  Continue OP OT to address cognition and safety with higher level IADLs. .    OT 7A: Cx, attempted to see pt in AM, however, session cancelled as pt taken to scope. Pt declined following attempt due to planning to discharge.

## 2019-06-07 NOTE — DISCHARGE SUMMARY
[facility], [place]  Hospitalist Discharge Summary       Date of Admission:  6/4/2019  Date of Discharge:  6/7/2019  Discharging Provider: Tian Perez MD  Discharge Team: Hospitalist Service Gold 8    Discharge Diagnoses   Hepatic encephalopathy  Hypokalemia  Renal tubular acidosis  Mild right hydronephrosis  AMBER  Acute normocytic anemia  Portal hypertensive gastropathy  Alcoholic cirrhosis with ascites, esophageal varices      Follow-ups Needed After Discharge   Follow-up Appointments     Follow Up and recommended labs and tests      Follow up with your primary doctor in 1 week to check potassium levels   and your hemoglobin level. They can adjust your medications. You will need   to have a repeat renal ultrasound (kidney ultrasound) to look for fluid   buildup.             Unresulted Labs Ordered in the Past 30 Days of this Admission     Date and Time Order Name Status Description    6/4/2019 0733 Blood culture Preliminary     6/4/2019 0733 Blood culture Preliminary       These results will be followed up by Dr. Perez    Discharge Disposition   Discharged to home  Condition at discharge: Stable    Hospital Course   Hepatic encephalopathy.  Was admitted for altered mental status and asterixis, consistent with hepatic encephalopathy. Likely secondary to combination of not taking his lactulose as well as hypokalemia from RTA. Improved with lactulose and replacing potassium. There was no evidence of infection and no significant ascites to tap. On discharge, mental status was greatly improved and he was encouraged to continue taking his lactulose and rifaximin. Potassium replacement as below, and should follow up with PCP in one week to recheck labs.    Hypokalemia, RTA, right hydronephrosis:   Nephrology was consulted. Recommended increasing spironolactone (was taking 50mg daily, increased to 150mg daily) and to add sodium bicarbonate TID. Renal ultrasound showed mild right  hydronephrosis; no evidence of obstruction and no signs of infection. Should follow up with PCP to order repeat renal US and will follow up with nephrology in 2-4 weeks for management of RTA. Repeat BMP at PCP visit.    Acute normocytic anemia:   Had no signs of GI bleed during admission. Received 1 unit PRBCs. GI performed endoscopy, which showed Diminutive varices, mild PHG, normal duodenum. No acute variceal bleed or ulceration to explain anemia. B12 and folate levels were normal. Should continue PPI for 8 weeks and follow up with hepatology as scheduled.    Consultations This Hospital Stay   PHYSICAL THERAPY ADULT IP CONSULT  OCCUPATIONAL THERAPY ADULT IP CONSULT  NUTRITION SERVICES ADULT IP CONSULT  INTERNAL MEDICINE PROCEDURE TEAM ADULT IP CONSULT [facility] - PARACENTESIS  NEPHROLOGY GENERAL ADULT IP CONSULT  MEDICATION HISTORY IP PHARMACY CONSULT  VASCULAR ACCESS CARE ADULT IP CONSULT  GI LUMINAL ADULT IP CONSULT  VASCULAR ACCESS CARE ADULT IP CONSULT    Code Status   Full Code    Time Spent on this Encounter   I, Tian Perez, personally saw the patient today and spent greater than 30 minutes discharging this patient.       Tian Perez MD  [facility], [place]  ______________________________________________________________________    Physical Exam   Vital Signs: Temp: 97.9  F (36.6  C) Temp src: Oral BP: 125/69 Pulse: 78 Heart Rate: 70 Resp: 20 SpO2: 100 % O2 Device: None (Room air)    Weight: 152 lbs 3.2 oz  Constitutional: awake, alert, cooperative, no apparent distress, and appears stated age  Respiratory: No increased work of breathing, good air exchange, clear to auscultation bilaterally, no crackles or wheezing  Cardiovascular: Normal apical impulse, regular rate and rhythm, normal S1 and S2, no S3 or S4, and no murmur noted  GI: Normal bowel sounds, slightly distended, no tenderness on palpation  Neuropsychiatric: Asterixis present but decreased, oriented to  person, place, and time      Primary Care Physician   Rosette Wills    Discharge Orders      NEPHROLOGY ADULT REFERRAL      Reason for your hospital stay    You were admitted for hepatic encephalopathy, or toxins not cleared by your body due to liver disease. This was likely caused by low potassium and low hemoglobin level. Keep taking lactulose to have 3-5 loose bowel movements per day to prevent toxin buildup. We also increased your dose of spironolactone and prescribed potassium to keep your potassium in a normal range.     Activity    Your activity upon discharge: activity as tolerated     When to contact your care team    Call your doctor if you have bleeding, black stools, confusion, decreased energy, or any other concerning symptoms     Follow Up and recommended labs and tests    Follow up with your primary doctor in 1 week to check potassium levels and your hemoglobin level. They can adjust your medications. You will need to have a repeat renal ultrasound (kidney ultrasound) to look for fluid buildup.     Full Code     Diet    Follow this diet upon discharge      Regular Diet Adult       Significant Results and Procedures   Most Recent 3 CBC's:  Recent Labs   Lab Test 06/07/19  0644 06/06/19  0609 06/05/19  1655   WBC 2.7* 2.2* 2.0*   HGB 8.6* 7.0* 7.2*   MCV 96 97 97   PLT 55* 57* 52*     Most Recent 3 BMP's:  Recent Labs   Lab Test 06/07/19  0644 06/06/19  1749 06/06/19  0609 06/05/19  1655     --  143 142   POTASSIUM 3.4 3.9 3.3* 3.4   CHLORIDE 120*  --  120* 118*   CO2 15*  --  14* 16*   BUN 9  --  7 8   CR 1.18  --  1.27* 1.19   ANIONGAP 9  --  9 7   JULISSA 9.1  --  8.8 8.7   GLC 93  --  81 197*     Most Recent 2 LFT's:  Recent Labs   Lab Test 06/07/19  0644 06/06/19  0609   AST 37 38   ALT 14 14   ALKPHOS 127 111   BILITOTAL 8.0* 6.8*     Most Recent 3 INR's:  Recent Labs   Lab Test 06/07/19  0644 06/06/19  0609 06/05/19  0626   INR 2.58* 2.87* 2.45*       Discharge Medications   Current  Discharge Medication List      START taking these medications    Details   pantoprazole (PROTONIX) 40 MG EC tablet Take 1 tablet (40 mg) by mouth every morning (before breakfast)  Qty: 30 tablet, Refills: 1    Associated Diagnoses: Gastritis with hemorrhage, unspecified chronicity, unspecified gastritis type      potassium chloride ER (K-DUR/KLOR-CON M) 20 MEQ CR tablet Take 1 tablet (20 mEq) by mouth daily  Qty: 30 tablet, Refills: 1    Associated Diagnoses: Hypopotassemia      sodium bicarbonate 650 MG tablet Take 1 tablet (650 mg) by mouth 3 times daily  Qty: 90 tablet, Refills: 1    Associated Diagnoses: RTA (renal tubular acidosis)         CONTINUE these medications which have CHANGED    Details   folic acid (FOLVITE) 1 MG tablet Take 1 tablet (1 mg) by mouth daily  Qty: 30 tablet, Refills: 1    Associated Diagnoses: Decompensation of cirrhosis of liver (H)      spironolactone (ALDACTONE) 100 MG tablet Take 1.5 tablets (150 mg) by mouth daily  Qty: 45 tablet, Refills: 1    Associated Diagnoses: S/P TIPS (transjugular intrahepatic portosystemic shunt); Alcoholic cirrhosis of liver with ascites (H)         CONTINUE these medications which have NOT CHANGED    Details   CONSTULOSE 10 GM/15ML solution TAKE 30MLS BY MOUTH THREE TIMES DAILY AS NEEDED FOR CONSTIPATION ( TAKE AS NEEDED TO MAINTAIN 3 TO 4 BOWEL MOVEMENTS DAILY)  Qty: 1000 mL, Refills: 11    Associated Diagnoses: Alcoholic cirrhosis of liver with ascites (H); Hepatic encephalopathy (H)      furosemide (LASIX) 20 MG tablet Take 1 tablet (20 mg) by mouth daily  Qty: 30 tablet, Refills: 3    Associated Diagnoses: S/P TIPS (transjugular intrahepatic portosystemic shunt); Alcoholic cirrhosis of liver with ascites (H)      MULTIPLE VITAMINS PO Take 1 tablet by mouth daily      rifaximin (XIFAXAN) 550 MG TABS tablet Take 1 tablet (550 mg) by mouth 2 times daily  Qty: 180 tablet, Refills: 0    Associated Diagnoses: Hepatic encephalopathy (H)      vitamin B1  (THIAMINE) 100 MG tablet Take 1 tablet (100 mg) by mouth daily  Qty: 30 tablet, Refills: 0    Associated Diagnoses: Decompensation of cirrhosis of liver (H)      vitamin D3 (CHOLECALCIFEROL) 2000 units (50 mcg) tablet Take 1 tablet by mouth daily           Allergies   Allergies   Allergen Reactions     Prednisone Visual Disturbance     Trazodone Visual Disturbance     Benadryl [Diphenhydramine] Other (See Comments)     Delirium (visual and auditory hallucinations)     Oxycodone Other (See Comments)     Delirium and constipation

## 2019-06-10 ENCOUNTER — PATIENT OUTREACH (OUTPATIENT)
Dept: CARE COORDINATION | Facility: CLINIC | Age: 54
End: 2019-06-10

## 2019-06-10 LAB
BACTERIA SPEC CULT: NO GROWTH
BACTERIA SPEC CULT: NO GROWTH
Lab: NORMAL
Lab: NORMAL
SPECIMEN SOURCE: NORMAL
SPECIMEN SOURCE: NORMAL

## 2019-06-12 ENCOUNTER — PATIENT OUTREACH (OUTPATIENT)
Dept: CARE COORDINATION | Facility: CLINIC | Age: 54
End: 2019-06-12

## 2019-06-12 DIAGNOSIS — D63.8 ANEMIA OF CHRONIC DISEASE: ICD-10-CM

## 2019-06-12 DIAGNOSIS — E87.6 HYPOKALEMIA: ICD-10-CM

## 2019-06-12 DIAGNOSIS — D63.8 ANEMIA OF CHRONIC DISEASE: Primary | ICD-10-CM

## 2019-06-12 NOTE — PROGRESS NOTES
Clinic Care Coordination Contact  [facility]/Voicemail    Referral Source: IP Handoff  Clinical Data: Care Coordinator Outreach  Outreach attempted x 1.  Left message on voicemail with call back information and requested return call.  Plan: Care Coordinator will mail out care coordination introduction letter with care coordinator contact information and explanation of care coordination services. Care Coordinator will try to reach patient again in 1-2 business days.  Kulwinder Marie RN  Primary Care Clinic RN Care Coordinator  [facility]   [phone] or [phone]

## 2019-06-12 NOTE — LETTER
Health Care Home - Access Care Plan    About Me:    Patient Name:  Ivan Bell    YOB: 1965  Age:                      53 year old   Vickie MRN:     [id number] Telephone Information:   Home Phone [phone]   Mobile [phone]       Address:  [address] [place] [address] Email address:  [email or web address]      Emergency Contact(s)   Name Relationship Lgl Grd Work Phone Home Phone Mobile Phone   1. JAQUELINAPTTY Spouse  [phone]   2. QUINTIN VARGAS Daughter                 Health Maintenance:    Health Maintenance Due   Topic Date Due     MICROALBUMIN  1965     ADVANCED DIRECTIVE PLANNING  1965     HIV SCREENING  08/29/1978     ZOSTER IMMUNIZATION (1 of 2) 08/29/2013     DTAP/TDAP/TD IMMUNIZATION (2 - Td) 12/19/2015     LIPID  02/12/2016     PREVENTIVE CARE VISIT  10/03/2017     PHQ-2  01/01/2019       My Access Plan  Medical Emergency 917   Questions or concerns during clinic hours Primary Clinic Line, I will call the clinic directly: [facility] - [phone]   24 Hour Appointment Line [phone] or  [phone] (toll free)   24 Hour Nurse Line [phone] (toll free)   Questions or concerns outside clinic hours 24 Hour Appointment Line, I will call the after-hours on-call line:   [facility] [phone] or [phone]) (toll-free)   Preferred Urgent Care [facility], [phone]   Preferred Hospital [place], [place]  [phone]   Preferred Pharmacy [facility] [address] [facility]     Behavioral Health Crisis Line The National Suicide Prevention Lifeline at 1-132.896.4371 or 918                     My Care Team Members  Patient Care Team       Relationship Specialty Notifications Start End    Rosette Wills MD PCP - General Family Practice  12/26/13     Phone: [phone] Fax: [phone]          [address] [place] [address]    Devin Diego MD MD Family Practice  2/26/16     referring to hepatology    Phone: [phone] Fax: [phone] [address] [place] [address]    Gonzalo Wahl MD MD Gastroenterology  2/26/16     Phone: [phone] Fax: [phone]         [address] [facility] [address]    Jelani Tristan MD MD Radiology  6/11/18     Phone: [phone] Fax: [phone]         [address] [facility] [address]    Vaishali Goff RN Specialty Care Coordinator Hepatology  9/17/18     Phone: [phone]         Rosette iWlls MD Assigned PCP   4/26/19     Phone: [phone] Fax: [phone]         [address] [place] [address]    Tommy Marie, RN Clinic Care Coordinator Primary Care - CC  6/12/19     Phone: [phone] Fax: [phone]         [address] [place] [address]           My Medical and Care Information  Problem List   Patient Active Problem List   Diagnosis     Hypertriglyceridemia     Gouty arthropathy     Erectile dysfunction     CARDIOVASCULAR SCREENING; LDL GOAL LESS THAN 130     24 hour contact given to patient      Hypertension goal BP (blood pressure) < 140/90     Alcoholic cirrhosis of liver with ascites (H)     Calculus of gallbladder without cholecystitis     Nephrolithiasis     Decompensation of cirrhosis of liver (H)     Encephalopathy      Current Medications and Allergies:  See printed Medication Report

## 2019-06-12 NOTE — LETTER
[place] CARE COORDINATION  [address] [address]  [phone]    June 12, 2019    Ivan Bell  [address] [place] [id number]      Dear Ivan,    I am a clinic care coordinator who works with Rosette Wills MD at [facility]. I recently tried to call and was unable to reach you. I wanted to introduce myself and provide you with my contact information so that you can call me with questions or concerns about your health care. Below is a description of clinic care coordination and how I can further assist you.     The clinic care coordinator is a registered nurse and/or  who understand the health care system. The goal of clinic care coordination is to help you manage your health and improve access to the [facility] system in the most efficient manner. The registered nurse can assist you in meeting your health care goals by providing education, coordinating services, and strengthening the communication among your providers. The  can assist you with financial, behavioral, psychosocial, chemical dependency, counseling, and/or psychiatric resources.    Please feel free to contact me at [phone], [phone], with any questions or concerns. We at [facility] are focused on providing you with the highest-quality healthcare experience possible and that all starts with you.     Sincerely,     Kulwinder Marie RN  Clinic Care Coordinator    Enclosed: I have enclosed a copy of a 24 Hour Access Plan. This has helpful phone numbers for you to call when needed. Please keep this in an easy to access place to use as needed.

## 2019-06-13 ENCOUNTER — MYC MEDICAL ADVICE (OUTPATIENT)
Dept: NURSING | Facility: CLINIC | Age: 54
End: 2019-06-13

## 2019-06-13 DIAGNOSIS — K76.82 HEPATIC ENCEPHALOPATHY (H): ICD-10-CM

## 2019-06-13 DIAGNOSIS — K74.60 DECOMPENSATION OF CIRRHOSIS OF LIVER (H): ICD-10-CM

## 2019-06-13 DIAGNOSIS — K72.90 DECOMPENSATION OF CIRRHOSIS OF LIVER (H): ICD-10-CM

## 2019-06-13 RX ORDER — RIFAXIMIN 550 MG/1
TABLET ORAL
Qty: 180 TABLET | Refills: 0 | Status: SHIPPED | OUTPATIENT
Start: 2019-06-13 | End: 2019-09-24

## 2019-06-13 NOTE — PROGRESS NOTES
Clinic Care Coordination Contact  [facility]/Voicemail    Referral Source: IP Handoff  Clinical Data: Care Coordinator Outreach  Outreach attempted x 2.  Left message on voicemail with call back information and requested return call.  Plan: Care Coordinator mailed out care coordination introduction letter on 6-12. Care Coordinator will do no further outreaches at this time.  Kulwinder Marie RN  Primary Care Clinic RN Care Coordinator  [facility]   [phone] or [phone]

## 2019-06-13 NOTE — TELEPHONE ENCOUNTER
Routing refill request to provider for review/approval because:  Drug not on the [facility] refill protocol   Last ordered by Leonarda Cabrera MD Cindy F. RN

## 2019-06-13 NOTE — TELEPHONE ENCOUNTER
Review of recent hospital discharge   Prescription approved per [facility] Refill Protocol or patient Primary care provider (PCP)  OLIVIA Vila RN/[place]  Attempted to call pt line busy and cell no VM  Sent mychart to pt   OLIVIA Vila RN/[place]

## 2019-06-13 NOTE — TELEPHONE ENCOUNTER
"Spoke with patient  Advised needs labs can only do them first thing in AM  Still working [employer]   Lab appt made for tomorrow  Pt asked me to cancel his paracentesis  For tomorrow, advised not to  But he has to work   \"stats he feels fine\"  I asked him to call and speak with them about [facility]  Clinic  RN/[facility]mer Smith        "

## 2019-06-13 NOTE — TELEPHONE ENCOUNTER
Requested Prescriptions   Pending Prescriptions Disp Refills     vitamin B1 (THIAMINE) 100 MG tablet 30 tablet 0     Sig: Take 1 tablet (100 mg) by mouth daily  Last Written Prescription Date:  04/06/2019 #30 x 0  Last filled - not provided  Last office visit: 4/1/2019 GISSEL Wills   Future Office Visit:  None         There is no refill protocol information for this order

## 2019-06-13 NOTE — TELEPHONE ENCOUNTER
Last filled 03-   Last qty 110  Last office visit 04-      Delphine Fall [facility]   Certified Pharmacy Technician

## 2019-06-14 ENCOUNTER — PATIENT OUTREACH (OUTPATIENT)
Dept: GASTROENTEROLOGY | Facility: CLINIC | Age: 54
End: 2019-06-14

## 2019-06-14 ENCOUNTER — MYC MEDICAL ADVICE (OUTPATIENT)
Dept: GASTROENTEROLOGY | Facility: CLINIC | Age: 54
End: 2019-06-14

## 2019-06-14 ENCOUNTER — TELEPHONE (OUTPATIENT)
Dept: FAMILY MEDICINE | Facility: CLINIC | Age: 54
End: 2019-06-14

## 2019-06-14 DIAGNOSIS — K70.31 ALCOHOLIC CIRRHOSIS OF LIVER WITH ASCITES (H): Primary | ICD-10-CM

## 2019-06-14 DIAGNOSIS — E87.6 HYPOKALEMIA: ICD-10-CM

## 2019-06-14 DIAGNOSIS — D63.8 ANEMIA OF CHRONIC DISEASE: ICD-10-CM

## 2019-06-14 DIAGNOSIS — E87.6 HYPOKALEMIA: Primary | ICD-10-CM

## 2019-06-14 LAB
ANION GAP SERPL CALCULATED.3IONS-SCNC: 8 MMOL/L (ref 3–14)
BUN SERPL-MCNC: 11 MG/DL (ref 7–30)
CALCIUM SERPL-MCNC: 9.5 MG/DL (ref 8.5–10.1)
CHLORIDE SERPL-SCNC: 109 MMOL/L (ref 94–109)
CO2 SERPL-SCNC: 21 MMOL/L (ref 20–32)
CREAT SERPL-MCNC: 1.17 MG/DL (ref 0.66–1.25)
ERYTHROCYTE [DISTWIDTH] IN BLOOD BY AUTOMATED COUNT: 18.2 % (ref 10–15)
GFR SERPL CREATININE-BSD FRML MDRD: 69 ML/MIN/{1.73_M2}
GLUCOSE SERPL-MCNC: 164 MG/DL (ref 70–99)
HCT VFR BLD AUTO: 30.5 % (ref 40–53)
HGB BLD-MCNC: 9.8 G/DL (ref 13.3–17.7)
MCH RBC QN AUTO: 30.9 PG (ref 26.5–33)
MCHC RBC AUTO-ENTMCNC: 32.1 G/DL (ref 31.5–36.5)
MCV RBC AUTO: 96 FL (ref 78–100)
PLATELET # BLD AUTO: 65 10E9/L (ref 150–450)
POTASSIUM SERPL-SCNC: 2.6 MMOL/L (ref 3.4–5.3)
RBC # BLD AUTO: 3.17 10E12/L (ref 4.4–5.9)
SODIUM SERPL-SCNC: 138 MMOL/L (ref 133–144)
WBC # BLD AUTO: 3.4 10E9/L (ref 4–11)

## 2019-06-14 PROCEDURE — 80048 BASIC METABOLIC PNL TOTAL CA: CPT | Performed by: FAMILY MEDICINE

## 2019-06-14 PROCEDURE — 36415 COLL VENOUS BLD VENIPUNCTURE: CPT | Performed by: FAMILY MEDICINE

## 2019-06-14 PROCEDURE — 85027 COMPLETE CBC AUTOMATED: CPT | Performed by: FAMILY MEDICINE

## 2019-06-14 RX ORDER — LANOLIN ALCOHOL/MO/W.PET/CERES
100 CREAM (GRAM) TOPICAL DAILY
Qty: 30 TABLET | Refills: 0 | Status: SHIPPED | OUTPATIENT
Start: 2019-06-14 | End: 2019-09-24

## 2019-06-14 NOTE — TELEPHONE ENCOUNTER
Left message on patients home phone to call back. Tried cell phone and there was no answer.  Please advise on critical lab value.    Patient has Potassium 20 meq ordered on 6/7/2019 from [facility] ED.   Madisyn Tavarez RN

## 2019-06-14 NOTE — TELEPHONE ENCOUNTER
Per Madisyn, unable to reach patient today on critical value.  I suggest calling emergency contact and send him a [email or web address] message to reach out to him today on the critical value.  If he is feeling weak/dizzy or heart palpitations, he needs to go to the ER right away.     I suggest increasing potassium to 40 meq daily and f/u for office visit Monday with PCP/doctor.     Citlali Morgan PA-C

## 2019-06-14 NOTE — TELEPHONE ENCOUNTER
[facility] Hepatology clinic providers will be contacting patient with direction on critical potassium. If the patient does not need to be seen Monday please release the hold at 2:20. Thanks, Madisyn Tavarez RN

## 2019-06-14 NOTE — TELEPHONE ENCOUNTER
Labs reviewed.  Patient potassium was normal on 6/7/19.  Has there been any changes in regards to patient's medications?  Lasix can cause low potassium but his medications states that he has been taking this since 4/15/19.  He was recently hospitalized from 6/4-6/7 for hepatic encephalopathy. Constulose can cause low potassium as well.  Confirm that he took his potassium prescribed on 6/7/19. Encourage food high in potassium (bananas, potatoes, yogurt, avocados, and spinach).  Recheck labs in 3 days.  Advise to schedule lab appointment.       1. Hypokalemia  - Basic metabolic panel  (Ca, Cl, CO2, Creat, Gluc, K, Na, BUN); Future

## 2019-06-14 NOTE — TELEPHONE ENCOUNTER
Ivan called back. He is taking the lasix every day and he said the hospital doctor called him on Thursday and told him to take 2 of the potassium pills. Ivan was not at home so he wasn't sure if they were 20 meq tablets.So more than likely he is taking 40 meq currently.    He said he feels fine and that the [facility] wanted him to do the lab.  I do see Dr Wills signed the last one.    He needs to know what he should take now and if he should stop the Lasix? He said he may not be reached by phone but he will read the My Chart note for direction.If he is called on his mobile phone soon he may be able to take the call.     Please see Chasidy's note from yesterday. The patient wanted to cancel his paracentesis and go to work which is where he is today.

## 2019-06-14 NOTE — TELEPHONE ENCOUNTER
Please assess for symptoms of low potassium.  Please verify that has been taking potassium 20 mill equivalents orally daily.  If has should take a total of 60 mEq orally today and then increase to 40 mEq orally daily.  Plan to recheck potassium in 7 to 10 days.    Jenifer FRANCOC

## 2019-06-14 NOTE — PROGRESS NOTES
Received call from patient's wife, asking to clarify the instructions patient received today from primary care. Discussed with VICKIE Mars, and reviewed with Celestino Verduzco PA-C. Patient was discharged 6/7/19 on potassium 20 mEq daily, lasix 20 mg daily, and spironolactone 150 mg daily. Today potassium is 2.6. Per Celestino Verduzco, instructed to increase potassium to 60 mEq daily x 3 days, and then decrease to 40 mEq daily. Recheck BMP on Wednesday 6/19/19. Will also send these instructions to patient via [employer] App. Patient will follow up with his PCP Dr. Wills on 6/24/19 and with Dr. Bales in hepatology on 7/23/19.

## 2019-06-14 NOTE — TELEPHONE ENCOUNTER
I am also holding an appointment in Dr Christopher schedule on Monday afternoon as he has not had a hospital follow-up visit. If he should see Dr Wills let me know. Madisyn Tavarez RN

## 2019-06-17 ENCOUNTER — OFFICE VISIT (OUTPATIENT)
Dept: FAMILY MEDICINE | Facility: CLINIC | Age: 54
End: 2019-06-17
Payer: COMMERCIAL

## 2019-06-17 VITALS
OXYGEN SATURATION: 100 % | BODY MASS INDEX: 22.02 KG/M2 | HEIGHT: 70 IN | WEIGHT: 153.8 LBS | HEART RATE: 90 BPM | SYSTOLIC BLOOD PRESSURE: 132 MMHG | DIASTOLIC BLOOD PRESSURE: 70 MMHG | TEMPERATURE: 98.3 F

## 2019-06-17 DIAGNOSIS — E87.6 HYPOPOTASSEMIA: ICD-10-CM

## 2019-06-17 DIAGNOSIS — D64.9 ANEMIA, UNSPECIFIED TYPE: ICD-10-CM

## 2019-06-17 DIAGNOSIS — K70.31 ALCOHOLIC CIRRHOSIS OF LIVER WITH ASCITES (H): Primary | ICD-10-CM

## 2019-06-17 DIAGNOSIS — N13.39 OTHER HYDRONEPHROSIS: ICD-10-CM

## 2019-06-17 LAB
ALBUMIN SERPL-MCNC: 3.4 G/DL (ref 3.4–5)
ALP SERPL-CCNC: 186 U/L (ref 40–150)
ALT SERPL W P-5'-P-CCNC: 17 U/L (ref 0–70)
ANION GAP SERPL CALCULATED.3IONS-SCNC: 8 MMOL/L (ref 3–14)
AST SERPL W P-5'-P-CCNC: 40 U/L (ref 0–45)
BASOPHILS # BLD AUTO: 0 10E9/L (ref 0–0.2)
BASOPHILS NFR BLD AUTO: 0.7 %
BILIRUB SERPL-MCNC: 6.7 MG/DL (ref 0.2–1.3)
BUN SERPL-MCNC: 7 MG/DL (ref 7–30)
CALCIUM SERPL-MCNC: 9.7 MG/DL (ref 8.5–10.1)
CHLORIDE SERPL-SCNC: 111 MMOL/L (ref 94–109)
CO2 SERPL-SCNC: 22 MMOL/L (ref 20–32)
CREAT SERPL-MCNC: 1.25 MG/DL (ref 0.66–1.25)
DIFFERENTIAL METHOD BLD: ABNORMAL
EOSINOPHIL # BLD AUTO: 0.4 10E9/L (ref 0–0.7)
EOSINOPHIL NFR BLD AUTO: 10.1 %
ERYTHROCYTE [DISTWIDTH] IN BLOOD BY AUTOMATED COUNT: 18.1 % (ref 10–15)
GFR SERPL CREATININE-BSD FRML MDRD: 64 ML/MIN/{1.73_M2}
GLUCOSE SERPL-MCNC: 158 MG/DL (ref 70–99)
HCT VFR BLD AUTO: 30.7 % (ref 40–53)
HGB BLD-MCNC: 9.7 G/DL (ref 13.3–17.7)
IMM GRANULOCYTES # BLD: 0 10E9/L (ref 0–0.4)
IMM GRANULOCYTES NFR BLD: 0.7 %
LYMPHOCYTES # BLD AUTO: 0.7 10E9/L (ref 0.8–5.3)
LYMPHOCYTES NFR BLD AUTO: 16.9 %
MCH RBC QN AUTO: 30.6 PG (ref 26.5–33)
MCHC RBC AUTO-ENTMCNC: 31.6 G/DL (ref 31.5–36.5)
MCV RBC AUTO: 97 FL (ref 78–100)
MONOCYTES # BLD AUTO: 0.3 10E9/L (ref 0–1.3)
MONOCYTES NFR BLD AUTO: 7.7 %
NEUTROPHILS # BLD AUTO: 2.7 10E9/L (ref 1.6–8.3)
NEUTROPHILS NFR BLD AUTO: 63.9 %
NRBC # BLD AUTO: 0 10*3/UL
NRBC BLD AUTO-RTO: 0 /100
PLATELET # BLD AUTO: 75 10E9/L (ref 150–450)
POTASSIUM SERPL-SCNC: 4 MMOL/L (ref 3.4–5.3)
PROT SERPL-MCNC: 6.2 G/DL (ref 6.8–8.8)
RBC # BLD AUTO: 3.17 10E12/L (ref 4.4–5.9)
SODIUM SERPL-SCNC: 141 MMOL/L (ref 133–144)
WBC # BLD AUTO: 4.2 10E9/L (ref 4–11)

## 2019-06-17 PROCEDURE — 85025 COMPLETE CBC W/AUTO DIFF WBC: CPT | Performed by: FAMILY MEDICINE

## 2019-06-17 PROCEDURE — 36415 COLL VENOUS BLD VENIPUNCTURE: CPT | Performed by: FAMILY MEDICINE

## 2019-06-17 PROCEDURE — 99214 OFFICE O/P EST MOD 30 MIN: CPT | Performed by: FAMILY MEDICINE

## 2019-06-17 PROCEDURE — 80053 COMPREHEN METABOLIC PANEL: CPT | Performed by: FAMILY MEDICINE

## 2019-06-17 RX ORDER — POTASSIUM CHLORIDE 1500 MG/1
20 TABLET, EXTENDED RELEASE ORAL DAILY
Qty: 30 TABLET | Refills: 1 | Status: ON HOLD | COMMUNITY
Start: 2019-06-17 | End: 2020-01-01

## 2019-06-17 ASSESSMENT — ENCOUNTER SYMPTOMS
SHORTNESS OF BREATH: 0
PARESTHESIAS: 0
SORE THROAT: 0
ABDOMINAL PAIN: 0
CHILLS: 0
CONSTIPATION: 0
COUGH: 0
PALPITATIONS: 0
DIZZINESS: 0
MYALGIAS: 0
HEMATOCHEZIA: 0
ARTHRALGIAS: 0
NAUSEA: 0
WEAKNESS: 0
TREMORS: 1
HEMATURIA: 0
HEARTBURN: 0
FREQUENCY: 0
DIARRHEA: 0
JOINT SWELLING: 0
NERVOUS/ANXIOUS: 0
EYE PAIN: 0
FEVER: 0
HEADACHES: 0
DYSURIA: 0

## 2019-06-17 ASSESSMENT — MIFFLIN-ST. JEOR: SCORE: 1538.88

## 2019-06-17 NOTE — PATIENT INSTRUCTIONS
Chiquita Bell,    Thank you for allowing [facility] to manage your care.    Please continue with your current medications.     I ordered some blood work, please go to the laboratory to get your laboratory studies.    I ordered a renal ultrasound , please call diagnostic imaging [phone] to schedule your test around 7/5/19.     Encourage foods high in potassium (bananas, potatoes, yogurt, avocados, and spinach)    Please allow 1-2 business days for our office to call you in regards to your laboratory/radiological studies.  If not done so, I encourage you to login into Mychart [email or web address] to review your results as well.     If you have any questions or concerns, please feel free to call us at [phone].    Sincerely,    Dr. Washington

## 2019-06-17 NOTE — PROGRESS NOTES
Subjective     Ivan Bell is a 53 year old male who presents to clinic today for the following health issues:    HPI       Hospital Follow-up Visit:    Hospital/Nursing Home/IP Rehab Facility: [facility]  Date of Admission: 6/4/19  Date of Discharge: 6/7/19  Reason(s) for Admission: low potassium            Problems taking medications regularly:  None       Medication changes since discharge: Potassium dosage changed        Problems adhering to non-medication therapy:  None    Summary of hospitalization:  [facility] discharge summary reviewed  Diagnostic Tests/Treatments reviewed.  Follow up needed: BMP, CBC, and renal ultrasound  Other Healthcare Providers Involved in Patient s Care: Nephrology         Update since discharge: stable.   Post Discharge Medication Reconciliation: discharge medications reconciled, continue medications without change.  Plan of care communicated with patient and wife     Coding guidelines for this visit:  Type of Medical   Decision Making Face-to-Face Visit       within 7 Days of discharge Face-to-Face Visit        within 14 days of discharge   Moderate Complexity [id number]   High Complexity [id number]            Patient Active Problem List   Diagnosis     Hypertriglyceridemia     Gouty arthropathy     Erectile dysfunction     CARDIOVASCULAR SCREENING; LDL GOAL LESS THAN 130     24 hour contact given to patient      Hypertension goal BP (blood pressure) < 140/90     Alcoholic cirrhosis of liver with ascites (H)     Calculus of gallbladder without cholecystitis     Nephrolithiasis     Decompensation of cirrhosis of liver (H)     Encephalopathy     Past Surgical History:   Procedure Laterality Date     ANKLE SURGERY Left      COLONOSCOPY N/A 3/31/2016    Procedure: COLONOSCOPY;  Surgeon: Rhys Uriostegui MD;  Location:  GI     ESOPHAGOSCOPY, GASTROSCOPY, DUODENOSCOPY (EGD), COMBINED N/A 3/31/2016    Procedure: COMBINED ESOPHAGOSCOPY,  GASTROSCOPY, DUODENOSCOPY (EGD);  Surgeon: Rhys Uriostegui MD;  Location:  GI     ESOPHAGOSCOPY, GASTROSCOPY, DUODENOSCOPY (EGD), COMBINED N/A 3/9/2018    Procedure: COMBINED ESOPHAGOSCOPY, GASTROSCOPY, DUODENOSCOPY (EGD), BIOPSY SINGLE OR MULTIPLE;  EGD;  Surgeon: Gonzalo Wahl MD;  Location:  GI     ESOPHAGOSCOPY, GASTROSCOPY, DUODENOSCOPY (EGD), COMBINED N/A 2019    Procedure: ESOPHAGOGASTRODUODENOSCOPY (EGD);  Surgeon: Gonzalo Wahl MD;  Location:  GI     KNEE SURGERY Left      KNEE SURGERY Right      SIGMOIDOSCOPY FLEXIBLE N/A 10/31/2017    Procedure: SIGMOIDOSCOPY FLEXIBLE;;  Surgeon: Armaan Adams MD;  Location:  GI     TIPS Procedure  2018       Social History     Tobacco Use     Smoking status: Former Smoker     Types: Dip, chew, snus or snuff     Last attempt to quit: 1998     Years since quittin.8     Smokeless tobacco: Current User     Last attempt to quit: 10/24/2017     Tobacco comment: 1 tin per 10 days.   Substance Use Topics     Alcohol use: No     Alcohol/week: 10.5 oz     Types: 21 Cans of beer per week     Comment: last etoh 16, did have Odouls ~2017     Family History   Problem Relation Age of Onset     Family History Negative Mother      Family History Negative Father      Hypertension Father      Cerebrovascular Disease Father 87     Breast Cancer Maternal Grandmother      Rheumatoid Arthritis Daughter      Depression Daughter      Cancer - colorectal No family hx of      Prostate Cancer No family hx of      Liver Disease No family hx of          Current Outpatient Medications   Medication Sig Dispense Refill     potassium chloride ER (K-DUR/KLOR-CON M) 20 MEQ CR tablet Take 2 tablets (40 mEq) by mouth daily 30 tablet 1     CONSTULOSE 10 GM/15ML solution TAKE 30MLS BY MOUTH THREE TIMES DAILY AS NEEDED FOR CONSTIPATION ( TAKE AS NEEDED TO MAINTAIN 3 TO 4 BOWEL MOVEMENTS DAILY) 1000 mL 11     folic acid (FOLVITE) 1 MG tablet  Take 1 tablet (1 mg) by mouth daily 30 tablet 1     furosemide (LASIX) 20 MG tablet Take 1 tablet (20 mg) by mouth daily 30 tablet 3     MULTIPLE VITAMINS PO Take 1 tablet by mouth daily       pantoprazole (PROTONIX) 40 MG EC tablet Take 1 tablet (40 mg) by mouth every morning (before breakfast) 30 tablet 1     sodium bicarbonate 650 MG tablet Take 1 tablet (650 mg) by mouth 3 times daily 90 tablet 1     spironolactone (ALDACTONE) 100 MG tablet Take 1.5 tablets (150 mg) by mouth daily 45 tablet 1     vitamin B1 (THIAMINE) 100 MG tablet Take 1 tablet (100 mg) by mouth daily 30 tablet 0     vitamin D3 (CHOLECALCIFEROL) 2000 units (50 mcg) tablet Take 1 tablet by mouth daily       XIFAXAN 550 MG TABS tablet TAKE ONE TABLET BY MOUTH TWICE A  tablet 0     Allergies   Allergen Reactions     Prednisone Visual Disturbance     Trazodone Visual Disturbance     Benadryl [Diphenhydramine] Other (See Comments)     Delirium (visual and auditory hallucinations)     Oxycodone Other (See Comments)     Delirium and constipation     1. Hospital f/u 6/4/19-6/7/19: History of alcohol abuse.  This was noticed by his wife.  There was concerns that he was not taking his potassium (not taking for 2 weeks in May).  Patient thought he was suppose take lactulose as needed.  History of liver cirrhosis due alcohol.  Last drink was 5 months ago.  Patient was admitted for altered mental status most likely due hepatic encephalopathy. There was concern that patient has not been compliant with his lactulose.  During the hospital course patient was found to be hypokalemic secondary RTA.  He was treated with lactulose and had his potassium replaced.  During the hospitalization, patient was found to be anemic and had an endoscopy performed.  Patient was treated with lactulose.  He has a history of paracentesis.  As of today, states that symptoms are improved (altered mental status) but has tremors started on Saturday.  Patient is now taking  "lactulose TID.  As of note, patient was noted to have right sided hydronephrosis.  History of renal stones.     Review of Systems   Constitutional: Negative for chills and fever.   HENT: Negative for congestion, ear pain, hearing loss and sore throat.    Eyes: Negative for pain and visual disturbance.   Respiratory: Negative for cough and shortness of breath.    Cardiovascular: Negative for chest pain, palpitations and peripheral edema.   Gastrointestinal: Negative for abdominal pain, constipation, diarrhea, heartburn, hematochezia and nausea.   Genitourinary: Negative for discharge, dysuria, frequency, genital sores, hematuria, impotence and urgency.   Musculoskeletal: Negative for arthralgias, joint swelling and myalgias.   Skin: Negative for rash.   Neurological: Positive for tremors. Negative for dizziness, weakness, headaches and paresthesias.   Psychiatric/Behavioral: Negative for mood changes. The patient is not nervous/anxious.             Objective    /70 (BP Location: Left arm, Cuff Size: Adult Regular)   Pulse 90   Temp 98.3  F (36.8  C) (Tympanic)   Ht 1.778 m (5' 10\")   Wt 69.8 kg (153 lb 12.8 oz)   SpO2 100%   BMI 22.07 kg/m    Body mass index is 22.07 kg/m .  Physical Exam   Constitutional: He is oriented to person, place, and time. He appears well-developed and well-nourished. No distress.   HENT:   Head: Normocephalic and atraumatic.   Nose: Nose normal.   Eyes: EOM are normal. Scleral icterus (bilaterally) is present.   Neck: Normal range of motion. No tracheal deviation present.   Cardiovascular: Normal heart sounds.   No murmur heard.  Pulmonary/Chest: Effort normal and breath sounds normal. No respiratory distress. He has no wheezes.   Abdominal: Soft. He exhibits no mass. There is no tenderness. There is no guarding.   Mild ascites   Musculoskeletal: Normal range of motion.   Neurological: He is alert and oriented to person, place, and time.   Skin: No rash noted.   Psychiatric: He " has a normal mood and affect. His behavior is normal.      6/6/19  IMPRESSION:  1.  Stable appearance of mild-to-moderate right hydronephrosis.  2.  Bilateral renal stones.  3.  Small volume of free intraperitoneal fluid.    Assessment & Plan     1. Alcoholic cirrhosis of liver with ascites (H)  S/P TIPS and multiple paracentesis.  Last drink 02/2019.  Stressed the importance of abstaining from alcohol.  Recent admitted for hepatic encephalopathy due to not taking lactulose correctly.  Continue with lactulose, lasix, and paracentesis.  Appreciate GI f/u.     2. Hypopotassemia  Continue with potassium 40 mEq daily  - potassium chloride ER (K-DUR/KLOR-CON M) 20 MEQ CR tablet; Take 2 tablets (40 mEq) by mouth daily  Dispense: 30 tablet; Refill: 1  - Comprehensive metabolic panel (BMP + Alb, Alk Phos, ALT, AST, Total. Bili, TP)    3. Anemia, unspecified type  S/P endoscopy  - CBC with platelets and differential    4. Other hydronephrosis  History of renal stones and ATN.   - US Renal Limited; Future     See Patient Instructions    Return in about 1 week (around 6/24/2019), or if symptoms worsen or fail to improve.    Too Washington, DO  [facility]

## 2019-06-18 NOTE — PROGRESS NOTES
"Patient was seen yesterday in his primary care clinic to follow up after hospitalization. Lab work was repeated and potassium had improved to 4.0. Patient currently taking potassium 40 mEq daily, lasix 20 mg daily, and spironolactone 150 mg daily. Creatinine up slightly but within normal limits. PCP ordered renal ultrasound to follow up on right hydronephrosis, not scheduled yet. Wife reports patient drinks \"a lot of pop\" daily. Encouraged him to cut back on pop and increase water intake. Patient complains of hands shaking which began over the weekend, such as when holding a cup or writing. PCP exam noted tremors. Wife notes no signs of confusion, sleepiness, and has no concerns about encephalopathy-related shaking. They are awaiting further instruction per PCP and plan to be seen again in 1 week. They would like to know if Dr. Bales has any further information or instructions for them. Will route to Dr. Bales for review.  Per Dr. Bales, patient can try to take additional lactulose for the shaking to see if this helps, and he feels much of this should calm down as liver function improves with sobriety. Discussed with patient's wife.  "

## 2019-06-19 NOTE — PROGRESS NOTES
Subjective     Ivan Bell is a 53 year old male who presents to clinic today for the following health issues:    HPI   Chief Complaint   Patient presents with     RECHECK     follow up visit from      Patient Active Problem List   Diagnosis     Hypertriglyceridemia     Gouty arthropathy     Erectile dysfunction     CARDIOVASCULAR SCREENING; LDL GOAL LESS THAN 130     24 hour contact given to patient      Hypertension goal BP (blood pressure) < 140/90     Alcoholic cirrhosis of liver with ascites (H)     Calculus of gallbladder without cholecystitis     Nephrolithiasis     Decompensation of cirrhosis of liver (H)     Encephalopathy     Past Surgical History:   Procedure Laterality Date     ANKLE SURGERY Left      COLONOSCOPY N/A 3/31/2016    Procedure: COLONOSCOPY;  Surgeon: Rhys Uriostegui MD;  Location:  GI     ESOPHAGOSCOPY, GASTROSCOPY, DUODENOSCOPY (EGD), COMBINED N/A 3/31/2016    Procedure: COMBINED ESOPHAGOSCOPY, GASTROSCOPY, DUODENOSCOPY (EGD);  Surgeon: Rhys Uriostegui MD;  Location:  GI     ESOPHAGOSCOPY, GASTROSCOPY, DUODENOSCOPY (EGD), COMBINED N/A 3/9/2018    Procedure: COMBINED ESOPHAGOSCOPY, GASTROSCOPY, DUODENOSCOPY (EGD), BIOPSY SINGLE OR MULTIPLE;  EGD;  Surgeon: Gonzalo Wahl MD;  Location:  GI     ESOPHAGOSCOPY, GASTROSCOPY, DUODENOSCOPY (EGD), COMBINED N/A 2019    Procedure: ESOPHAGOGASTRODUODENOSCOPY (EGD);  Surgeon: Gonzalo Wahl MD;  Location:  GI     KNEE SURGERY Left      KNEE SURGERY Right      SIGMOIDOSCOPY FLEXIBLE N/A 10/31/2017    Procedure: SIGMOIDOSCOPY FLEXIBLE;;  Surgeon: Armaan Adams MD;  Location:  GI     TIPS Procedure  2018       Social History     Tobacco Use     Smoking status: Former Smoker     Types: Dip, chew, snus or snuff     Last attempt to quit: 1998     Years since quittin.8     Smokeless tobacco: Current User     Last attempt to quit: 10/24/2017     Tobacco comment: 1 tin per 10  days.   Substance Use Topics     Alcohol use: No     Alcohol/week: 10.5 oz     Types: 21 Cans of beer per week     Comment: last etoh 2/14/16, did have Odouls ~8/2017     Family History   Problem Relation Age of Onset     Family History Negative Mother      Family History Negative Father      Hypertension Father      Cerebrovascular Disease Father 87     Breast Cancer Maternal Grandmother      Rheumatoid Arthritis Daughter      Depression Daughter      Cancer - colorectal No family hx of      Prostate Cancer No family hx of      Liver Disease No family hx of          Current Outpatient Medications   Medication Sig Dispense Refill     CONSTULOSE 10 GM/15ML solution TAKE 30MLS BY MOUTH THREE TIMES DAILY AS NEEDED FOR CONSTIPATION ( TAKE AS NEEDED TO MAINTAIN 3 TO 4 BOWEL MOVEMENTS DAILY) 1000 mL 11     folic acid (FOLVITE) 1 MG tablet Take 1 tablet (1 mg) by mouth daily 30 tablet 1     furosemide (LASIX) 20 MG tablet Take 1 tablet (20 mg) by mouth daily 30 tablet 3     MULTIPLE VITAMINS PO Take 1 tablet by mouth daily       pantoprazole (PROTONIX) 40 MG EC tablet Take 1 tablet (40 mg) by mouth every morning (before breakfast) 30 tablet 1     potassium chloride ER (K-DUR/KLOR-CON M) 20 MEQ CR tablet Take 2 tablets (40 mEq) by mouth daily 30 tablet 1     sodium bicarbonate 650 MG tablet Take 1 tablet (650 mg) by mouth 3 times daily 90 tablet 1     spironolactone (ALDACTONE) 100 MG tablet Take 1.5 tablets (150 mg) by mouth daily 45 tablet 1     vitamin B1 (THIAMINE) 100 MG tablet Take 1 tablet (100 mg) by mouth daily 30 tablet 0     vitamin D3 (CHOLECALCIFEROL) 2000 units (50 mcg) tablet Take 1 tablet by mouth daily       XIFAXAN 550 MG TABS tablet TAKE ONE TABLET BY MOUTH TWICE A  tablet 0     Allergies   Allergen Reactions     Prednisone Visual Disturbance     Trazodone Visual Disturbance     Benadryl [Diphenhydramine] Other (See Comments)     Delirium (visual and auditory hallucinations)     Oxycodone Other  "(See Comments)     Delirium and constipation       Reviewed and updated as needed this visit by Provider        1. Low potassium f/u: Patient is currently taking potassium 40 mEq.  States that his tremors has improved.  Patient has gained 6 lbs since the last visit.  Patient is trying to watch is fluid intake.  History of alcoholic cirrhosis with recent admission for hepatic encephalopathy.  He denies any swelling in his lower extremities.     Review of Systems   Constitutional: Negative for chills and fever.   HENT: Negative for congestion, ear pain, hearing loss and sore throat.    Eyes: Negative for pain and visual disturbance.   Respiratory: Negative for cough and shortness of breath.    Cardiovascular: Negative for chest pain, palpitations and peripheral edema.   Gastrointestinal: Negative for abdominal pain, constipation, diarrhea, heartburn, hematochezia and nausea.   Genitourinary: Negative for discharge, dysuria, frequency, genital sores, hematuria, impotence and urgency.   Musculoskeletal: Negative for arthralgias, joint swelling and myalgias.   Skin: Negative for rash.   Neurological: Negative for dizziness, weakness, headaches and paresthesias.   Psychiatric/Behavioral: Negative for mood changes. The patient is not nervous/anxious.       Objective    /62 (BP Location: Left arm, Cuff Size: Adult Regular)   Pulse 99   Temp 98.5  F (36.9  C) (Tympanic)   Resp 16   Ht 1.778 m (5' 10\")   Wt 72.1 kg (159 lb)   SpO2 100%   BMI 22.81 kg/m    Body mass index is 22.81 kg/m .  Physical Exam   Constitutional: He is oriented to person, place, and time. He appears well-developed and well-nourished. No distress.   HENT:   Head: Normocephalic and atraumatic.   Nose: Nose normal.   Eyes: EOM are normal. Scleral icterus (involving bilateral eyes) is present.   Neck: Normal range of motion. No tracheal deviation present.   Cardiovascular: Normal heart sounds.   No murmur heard.  Pulmonary/Chest: Effort normal " and breath sounds normal. No respiratory distress. He has no wheezes.   Abdominal: Soft. Bowel sounds are normal. He exhibits distension (mild abdominal).   Musculoskeletal: Normal range of motion.   Neurological: He is alert and oriented to person, place, and time.   No involuntary tremors appreciated   Skin: No rash noted.   Psychiatric: He has a normal mood and affect. His behavior is normal.      Assessment & Plan     1. Hypokalemia  Now improved.  Continue with potassium 40 mEq daily per day.    2. Alcoholic cirrhosis of liver with ascites (H)  Continue with lactulose.  Stressed the importance of daily weights.  Keep upcoming appointment with GI.     3. Other hydronephrosis  Stressed the importance of scheduling kidney ultrasound.     See Patient Instructions    Return in about 3 months (around 9/24/2019) for liver follow-up.    Too Washington [facility]

## 2019-06-24 ENCOUNTER — OFFICE VISIT (OUTPATIENT)
Dept: FAMILY MEDICINE | Facility: CLINIC | Age: 54
End: 2019-06-24
Payer: COMMERCIAL

## 2019-06-24 VITALS
TEMPERATURE: 98.5 F | RESPIRATION RATE: 16 BRPM | BODY MASS INDEX: 22.76 KG/M2 | WEIGHT: 159 LBS | HEART RATE: 99 BPM | HEIGHT: 70 IN | OXYGEN SATURATION: 100 % | DIASTOLIC BLOOD PRESSURE: 62 MMHG | SYSTOLIC BLOOD PRESSURE: 128 MMHG

## 2019-06-24 DIAGNOSIS — E87.6 HYPOKALEMIA: Primary | ICD-10-CM

## 2019-06-24 DIAGNOSIS — N13.39 OTHER HYDRONEPHROSIS: ICD-10-CM

## 2019-06-24 DIAGNOSIS — K70.31 ALCOHOLIC CIRRHOSIS OF LIVER WITH ASCITES (H): ICD-10-CM

## 2019-06-24 PROCEDURE — 99213 OFFICE O/P EST LOW 20 MIN: CPT | Performed by: FAMILY MEDICINE

## 2019-06-24 ASSESSMENT — ENCOUNTER SYMPTOMS
FEVER: 0
SHORTNESS OF BREATH: 0
NAUSEA: 0
JOINT SWELLING: 0
WEAKNESS: 0
DIARRHEA: 0
ABDOMINAL PAIN: 0
HEMATOCHEZIA: 0
HEADACHES: 0
HEARTBURN: 0
NERVOUS/ANXIOUS: 0
SORE THROAT: 0
PALPITATIONS: 0
COUGH: 0
EYE PAIN: 0
ARTHRALGIAS: 0
CHILLS: 0
FREQUENCY: 0
DIZZINESS: 0
PARESTHESIAS: 0
HEMATURIA: 0
CONSTIPATION: 0
MYALGIAS: 0
DYSURIA: 0

## 2019-06-24 ASSESSMENT — MIFFLIN-ST. JEOR: SCORE: 1562.47

## 2019-06-24 NOTE — PATIENT INSTRUCTIONS
Chiquita Bell,    Thank you for allowing [facility] to manage your care.    Please continue with your current medications.     Please schedule your kidney ultrasound, please call diagnostic imaging [phone] to schedule your test.    Please limit your total fluids to no more than 6 cups per day.  Continue with daily weights.     If you have any questions or concerns, please feel free to call us at [phone].    Sincerely,    Dr. Washington

## 2019-07-02 ENCOUNTER — PATIENT OUTREACH (OUTPATIENT)
Dept: GASTROENTEROLOGY | Facility: CLINIC | Age: 54
End: 2019-07-02

## 2019-07-02 NOTE — PROGRESS NOTES
"Patient's wife Elicia reports that Ivan is doing well, \"almost back to his normal self.\" She states he is back to work, increased energy, and improved appetite. He has gained some weight, but does not feel he is retaining fluid. She received a call to reschedule an upcoming appointment in July, and will await further contact with the . She has no questions or concerns at this time.  "

## 2019-07-15 ENCOUNTER — HOSPITAL ENCOUNTER (OUTPATIENT)
Dept: ULTRASOUND IMAGING | Facility: CLINIC | Age: 54
Discharge: HOME | End: 2019-07-15
Attending: FAMILY MEDICINE | Admitting: FAMILY MEDICINE
Payer: COMMERCIAL

## 2019-07-15 DIAGNOSIS — N13.39 OTHER HYDRONEPHROSIS: ICD-10-CM

## 2019-07-15 DIAGNOSIS — K70.31 ALCOHOLIC CIRRHOSIS OF LIVER WITH ASCITES (H): Primary | ICD-10-CM

## 2019-07-15 PROCEDURE — 76775 US EXAM ABDO BACK WALL LIM: CPT

## 2019-07-19 ENCOUNTER — TELEPHONE (OUTPATIENT)
Dept: GASTROENTEROLOGY | Facility: CLINIC | Age: 54
End: 2019-07-19

## 2019-07-22 ENCOUNTER — OFFICE VISIT (OUTPATIENT)
Dept: GASTROENTEROLOGY | Facility: CLINIC | Age: 54
End: 2019-07-22
Attending: INTERNAL MEDICINE
Payer: COMMERCIAL

## 2019-07-22 VITALS
WEIGHT: 161.4 LBS | DIASTOLIC BLOOD PRESSURE: 69 MMHG | OXYGEN SATURATION: 100 % | TEMPERATURE: 97.8 F | SYSTOLIC BLOOD PRESSURE: 135 MMHG | BODY MASS INDEX: 23.16 KG/M2 | HEART RATE: 81 BPM

## 2019-07-22 DIAGNOSIS — N25.89 RENAL TUBULAR ACIDOSIS: ICD-10-CM

## 2019-07-22 DIAGNOSIS — K70.31 ALCOHOLIC CIRRHOSIS OF LIVER WITH ASCITES (H): Primary | ICD-10-CM

## 2019-07-22 DIAGNOSIS — K70.31 ALCOHOLIC CIRRHOSIS OF LIVER WITH ASCITES (H): ICD-10-CM

## 2019-07-22 LAB
ALBUMIN SERPL-MCNC: 2.7 G/DL (ref 3.4–5)
ALP SERPL-CCNC: 132 U/L (ref 40–150)
ALT SERPL W P-5'-P-CCNC: 22 U/L (ref 0–70)
ANION GAP SERPL CALCULATED.3IONS-SCNC: 6 MMOL/L (ref 3–14)
AST SERPL W P-5'-P-CCNC: 46 U/L (ref 0–45)
BILIRUB DIRECT SERPL-MCNC: 2.4 MG/DL (ref 0–0.2)
BILIRUB SERPL-MCNC: 4.4 MG/DL (ref 0.2–1.3)
BUN SERPL-MCNC: 13 MG/DL (ref 7–30)
CALCIUM SERPL-MCNC: 8.4 MG/DL (ref 8.5–10.1)
CHLORIDE SERPL-SCNC: 108 MMOL/L (ref 94–109)
CO2 SERPL-SCNC: 22 MMOL/L (ref 20–32)
CREAT SERPL-MCNC: 1.17 MG/DL (ref 0.66–1.25)
ERYTHROCYTE [DISTWIDTH] IN BLOOD BY AUTOMATED COUNT: 16.7 % (ref 10–15)
GFR SERPL CREATININE-BSD FRML MDRD: 69 ML/MIN/{1.73_M2}
GLUCOSE SERPL-MCNC: 90 MG/DL (ref 70–99)
HCT VFR BLD AUTO: 32.6 % (ref 40–53)
HGB BLD-MCNC: 10.5 G/DL (ref 13.3–17.7)
INR PPP: 1.93 (ref 0.86–1.14)
MCH RBC QN AUTO: 30.6 PG (ref 26.5–33)
MCHC RBC AUTO-ENTMCNC: 32.2 G/DL (ref 31.5–36.5)
MCV RBC AUTO: 95 FL (ref 78–100)
PLATELET # BLD AUTO: 58 10E9/L (ref 150–450)
POTASSIUM SERPL-SCNC: 4.1 MMOL/L (ref 3.4–5.3)
PROT SERPL-MCNC: 5.7 G/DL (ref 6.8–8.8)
RBC # BLD AUTO: 3.43 10E12/L (ref 4.4–5.9)
SODIUM SERPL-SCNC: 136 MMOL/L (ref 133–144)
WBC # BLD AUTO: 3.3 10E9/L (ref 4–11)

## 2019-07-22 PROCEDURE — 80076 HEPATIC FUNCTION PANEL: CPT | Performed by: INTERNAL MEDICINE

## 2019-07-22 PROCEDURE — G0463 HOSPITAL OUTPT CLINIC VISIT: HCPCS | Mod: ZF

## 2019-07-22 PROCEDURE — 80048 BASIC METABOLIC PNL TOTAL CA: CPT | Performed by: INTERNAL MEDICINE

## 2019-07-22 PROCEDURE — 85027 COMPLETE CBC AUTOMATED: CPT | Performed by: INTERNAL MEDICINE

## 2019-07-22 PROCEDURE — 85610 PROTHROMBIN TIME: CPT | Performed by: INTERNAL MEDICINE

## 2019-07-22 PROCEDURE — 36415 COLL VENOUS BLD VENIPUNCTURE: CPT | Performed by: INTERNAL MEDICINE

## 2019-07-22 ASSESSMENT — PAIN SCALES - GENERAL: PAINLEVEL: NO PAIN (0)

## 2019-07-22 NOTE — NURSING NOTE
Chief Complaint   Patient presents with     RECHECK     ETOH cirrhosis     /69 (BP Location: Right arm, Patient Position: Sitting, Cuff Size: Adult Regular)   Pulse 81   Temp 97.8  F (36.6  C) (Oral)   Wt 73.2 kg (161 lb 6.4 oz)   SpO2 100%   BMI 23.16 kg/m    Elana Brito CMA    7/22/2019 8:08 AM

## 2019-07-22 NOTE — TELEPHONE ENCOUNTER
Health Call Center    Phone Message    May a detailed message be left on voicemail: no    Reason for Call: Other: Courtney RN from [facility] called to follow up on last message on about getting an update on patients treatment plan and status. Call her direct line at [phone]. Please advise.     Action Taken: Message routed to:  [facility] ([facility] Hepatology

## 2019-07-22 NOTE — PATIENT INSTRUCTIONS
Plan  1.  Stop furosemide  2.  Continue spironolactone  3.  Check blood work next week  4.  Behavioral/spiritual health referral  5.  Nephrology (kidney) referral  6.  See me in 3 months as scheduled    Gonzalo Bales MD  Hepatology  [facility]

## 2019-07-22 NOTE — LETTER
7/22/2019      RE: Ivan Bell  [id number] [facility] [id number]       [facility]    Hepatology follow-up    Follow-up visit for cirrhosis    Subjective:  53 year old male    Cirrhosis  - ETOH  - hx ascites, TIPS placement 5/14/18, 6/6/18  - hx HE  - hx variceal bleed Oct 2017  - ETOH hepatitis March 2019  - last EGD Apr 2018- medium EV with bandingx4, mild portal hypertensive gastropathy  - HCC screening- liver  TIPS doppler U/S  June 2019    The patient comes to clinic this morning with his wife for follow-up of alcoholic cirrhosis and recent alcoholic hepatitis.  Last clinic visit with me 09/2018.  Last clinic visit with PA 05/2019.  The patient was admitted to the hospital in 03/2019 with alcoholic hepatitis after resuming drinking alcohol.  He was admitted to the hospital a second time in 06/2019 with hepatic encephalopathy, anemia and hypokalemia secondary to renal tubular acidosis, lactulose and diuretic therapy.  The patient has not been admitted to the hospital since 06/2019.  Potassium chloride was recently increased due to hypokalemia.      The patient is feeling better compared to his presentation to the hospital in 03/2019.  His jaundice has gradually improved with time.      The patient has not had a paracentesis since 05/2019.  Last attempt at paracentesis was in 06/2019 when there was no fluid present.  He continues to take furosemide 20 mg and spironolactone 150 mg and is currently experiencing side effects of muscle cramps in his hands.  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  The patient denies any lower extremity edema.      The patient denies lethargy or confusion.  He continues to take lactulose 3 times a day with 3-4 bowel movements per day.      No history of melena, hematemesis or hematochezia.      The patient denies any fevers, sweats or chills.      Weight has been stable.  Appetite is good.  The patient last drank  alcohol in 02/2019.  He has not started any counseling or treatment programs for his alcohol abuse and recidivism.  He reports that returning to work has made it significantly easier to avoid alcohol.       Medical hx Surgical hx   Past Medical History:   Diagnosis Date     Cirrhosis (H)      Esophageal varices (H)      Hepatitis      Hypertension      Left calcaneus fracture 1/9/2006 January 16, 2006: Fell 10 feet from ladder onto left foot on frozen ground on 1/9/06 at home.  Immediate pain and unable to walk- seen at [place] and diagnosed with calcaneus fracture     Portal vein thrombosis     left occlusion, partial main      Past Surgical History:   Procedure Laterality Date     ANKLE SURGERY Left      COLONOSCOPY N/A 3/31/2016    Procedure: COLONOSCOPY;  Surgeon: Rhys Uriostegui MD;  Location:  GI     ESOPHAGOSCOPY, GASTROSCOPY, DUODENOSCOPY (EGD), COMBINED N/A 3/31/2016    Procedure: COMBINED ESOPHAGOSCOPY, GASTROSCOPY, DUODENOSCOPY (EGD);  Surgeon: Rhys Uriostegui MD;  Location:  GI     ESOPHAGOSCOPY, GASTROSCOPY, DUODENOSCOPY (EGD), COMBINED N/A 3/9/2018    Procedure: COMBINED ESOPHAGOSCOPY, GASTROSCOPY, DUODENOSCOPY (EGD), BIOPSY SINGLE OR MULTIPLE;  EGD;  Surgeon: Gonzalo Wahl MD;  Location:  GI     ESOPHAGOSCOPY, GASTROSCOPY, DUODENOSCOPY (EGD), COMBINED N/A 6/7/2019    Procedure: ESOPHAGOGASTRODUODENOSCOPY (EGD);  Surgeon: Gonzalo Wahl MD;  Location:  GI     KNEE SURGERY Left      KNEE SURGERY Right      SIGMOIDOSCOPY FLEXIBLE N/A 10/31/2017    Procedure: SIGMOIDOSCOPY FLEXIBLE;;  Surgeon: Armaan Adams MD;  Location:  GI     TIPS Procedure  06/06/2018          Medications  Prior to Admission medications    Medication Sig Start Date End Date Taking? Authorizing Provider   CONSTULOSE 10 GM/15ML solution TAKE 30MLS BY MOUTH THREE TIMES DAILY AS NEEDED FOR CONSTIPATION ( TAKE AS NEEDED TO MAINTAIN 3 TO 4 BOWEL MOVEMENTS DAILY) 4/26/19  Yes Nii  Gonzalo Davalos MD   folic acid (FOLVITE) 1 MG tablet Take 1 tablet (1 mg) by mouth daily 6/7/19  Yes Tian Perez MD   furosemide (LASIX) 20 MG tablet Take 1 tablet (20 mg) by mouth daily 4/15/19  Yes Celestino Verduzco PA-C   MULTIPLE VITAMINS PO Take 1 tablet by mouth daily   Yes Reported, Patient   pantoprazole (PROTONIX) 40 MG EC tablet Take 1 tablet (40 mg) by mouth every morning (before breakfast) 6/8/19  Yes Tian Perez MD   potassium chloride ER (K-DUR/KLOR-CON M) 20 MEQ CR tablet Take 2 tablets (40 mEq) by mouth daily 6/17/19  Yes Too Washington DO   sodium bicarbonate 650 MG tablet Take 1 tablet (650 mg) by mouth 3 times daily 6/7/19  Yes Tian Perez MD   spironolactone (ALDACTONE) 100 MG tablet Take 1.5 tablets (150 mg) by mouth daily 6/7/19  Yes Tian Perez MD   vitamin B1 (THIAMINE) 100 MG tablet Take 1 tablet (100 mg) by mouth daily 6/14/19  Yes Rosette Wills MD   vitamin D3 (CHOLECALCIFEROL) 2000 units (50 mcg) tablet Take 1 tablet by mouth daily   Yes Unknown, Entered By History   XIFAXAN 550 MG TABS tablet TAKE ONE TABLET BY MOUTH TWICE A DAY 6/13/19  Yes Rosette Wills MD       Allergies  Allergies   Allergen Reactions     Prednisone Visual Disturbance     Trazodone Visual Disturbance     Benadryl [Diphenhydramine] Other (See Comments)     Delirium (visual and auditory hallucinations)     Oxycodone Other (See Comments)     Delirium and constipation       Review of systems  A 10-point review of systems was negative    Examination  /69 (BP Location: Right arm, Patient Position: Sitting, Cuff Size: Adult Regular)   Pulse 81   Temp 97.8  F (36.6  C) (Oral)   Wt 73.2 kg (161 lb 6.4 oz)   SpO2 100%   BMI 23.16 kg/m     Body mass index is 23.16 kg/m .    Gen- well, NAD, A+Ox3, normal color  CVS- RRR  RS- CTA  Abd- soft, non-tender, no ascites or organomegaly on palpation or percussion, BS+  Extr- hands normal, no BESSY  Skin- no rash or  jaundice  Neuro- no asterixis  Psych- normal mood    Laboratory  Lab Results   Component Value Date     07/22/2019    POTASSIUM 4.1 07/22/2019    CHLORIDE 108 07/22/2019    CO2 22 07/22/2019    BUN 13 07/22/2019    CR 1.17 07/22/2019       Lab Results   Component Value Date    BILITOTAL 4.4 07/22/2019    ALT 22 07/22/2019    AST 46 07/22/2019    ALKPHOS 132 07/22/2019       Lab Results   Component Value Date    ALBUMIN 2.7 07/22/2019    PROTTOTAL 5.7 07/22/2019        Lab Results   Component Value Date    WBC 3.3 07/22/2019    HGB 10.5 07/22/2019    MCV 95 07/22/2019    PLT 58 07/22/2019       Lab Results   Component Value Date    INR 1.93 07/22/2019       Radiology  Abd U/S Jun 2019 reviewed  Renal U/S Jul 2019 reviewed- no right hydronephrosis, small free fluid    Assessment  53 year old male who presents for follow-up of decompensated alcoholic cirrhosis complicated with variceal bleed, ascites and hepatic encephalopathy after recent admission to hospital with alcoholic hepatitis.  MELD-Na= 22 (improved).  Last ETOH= Feb 2019.  Ascites resolved with sobriety and diuretics.  Will reduce diuretic doses now due to side effects.  Hepatic encephalopathy adequately managed with current medications.  Will refer to nephrology for follow-up of RTA.  Will need counseling to maintain sobriety to preserve remaining liver function.  Up to date with HCC screening.    We discussed the importance of maintenance of sobriety in order to improve and maintain liver function.  We also discussed the importance to start any program to help maintain sobriety given the patient's prior issues with recidivism and the liver transplant program's requirement for a minimum of 6 months (sometimes 12 months) sobriety prior to consideration for evaluation for liver transplant evaluation.    Plan  1.  Complete abstinence from alcohol  2.  Behavioral/spiritual health referral  3.  Discontinue furosemide  4.  Continue spironolactone 150mg PO  Q24 for now  5.  Check BMP in 1 week  6.  Continue lactulose and rifaximin  7.  Nephrology referral for follow-up of RTA  8.  Follow-up in 3 months as scheduled    Gonzalo Bales MD  Hepatology  [facility]

## 2019-07-22 NOTE — PROGRESS NOTES
[facility]    Hepatology follow-up    Follow-up visit for cirrhosis    Subjective:  53 year old male    Cirrhosis  - ETOH  - hx ascites, TIPS placement 5/14/18, 6/6/18  - hx HE  - hx variceal bleed Oct 2017  - ETOH hepatitis March 2019  - last EGD Apr 2018- medium EV with bandingx4, mild portal hypertensive gastropathy  - HCC screening- liver TIPS doppler U/S June 2019    The patient comes to clinic this morning with his wife for follow-up of alcoholic cirrhosis and recent alcoholic hepatitis.  Last clinic visit with me 09/2018.  Last clinic visit with PA 05/2019.  The patient was admitted to the hospital in 03/2019 with alcoholic hepatitis after resuming drinking alcohol.  He was admitted to the hospital a second time in 06/2019 with hepatic encephalopathy, anemia and hypokalemia secondary to renal tubular acidosis, lactulose and diuretic therapy.  The patient has not been admitted to the hospital since 06/2019.  Potassium chloride was recently increased due to hypokalemia.      The patient is feeling better compared to his presentation to the hospital in 03/2019.  His jaundice has gradually improved with time.      The patient has not had a paracentesis since 05/2019.  Last attempt at paracentesis was in 06/2019 when there was no fluid present.  He continues to take furosemide 20 mg and spironolactone 150 mg and is currently experiencing side effects of muscle cramps in his hands.  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  The patient denies any lower extremity edema.      The patient denies lethargy or confusion.  He continues to take lactulose 3 times a day with 3-4 bowel movements per day.      No history of melena, hematemesis or hematochezia.      The patient denies any fevers, sweats or chills.      Weight has been stable.  Appetite is good.  The patient last drank alcohol in 02/2019.  He has not started any counseling or treatment programs for his  alcohol abuse and recidivism.  He reports that returning to work has made it significantly easier to avoid alcohol.       Medical hx Surgical hx   Past Medical History:   Diagnosis Date     Cirrhosis (H)      Esophageal varices (H)      Hepatitis      Hypertension      Left calcaneus fracture 1/9/2006 January 16, 2006: Fell 10 feet from ladder onto left foot on frozen ground on 1/9/06 at home.  Immediate pain and unable to walk- seen at [place] and diagnosed with calcaneus fracture     Portal vein thrombosis     left occlusion, partial main      Past Surgical History:   Procedure Laterality Date     ANKLE SURGERY Left      COLONOSCOPY N/A 3/31/2016    Procedure: COLONOSCOPY;  Surgeon: Rhys Uriostegui MD;  Location:  GI     ESOPHAGOSCOPY, GASTROSCOPY, DUODENOSCOPY (EGD), COMBINED N/A 3/31/2016    Procedure: COMBINED ESOPHAGOSCOPY, GASTROSCOPY, DUODENOSCOPY (EGD);  Surgeon: Rhys Uriostegui MD;  Location:  GI     ESOPHAGOSCOPY, GASTROSCOPY, DUODENOSCOPY (EGD), COMBINED N/A 3/9/2018    Procedure: COMBINED ESOPHAGOSCOPY, GASTROSCOPY, DUODENOSCOPY (EGD), BIOPSY SINGLE OR MULTIPLE;  EGD;  Surgeon: Gonzalo Wahl MD;  Location:  GI     ESOPHAGOSCOPY, GASTROSCOPY, DUODENOSCOPY (EGD), COMBINED N/A 6/7/2019    Procedure: ESOPHAGOGASTRODUODENOSCOPY (EGD);  Surgeon: Gonzalo Wahl MD;  Location:  GI     KNEE SURGERY Left      KNEE SURGERY Right      SIGMOIDOSCOPY FLEXIBLE N/A 10/31/2017    Procedure: SIGMOIDOSCOPY FLEXIBLE;;  Surgeon: Armaan Adams MD;  Location:  GI     TIPS Procedure  06/06/2018          Medications  Prior to Admission medications    Medication Sig Start Date End Date Taking? Authorizing Provider   CONSTULOSE 10 GM/15ML solution TAKE 30MLS BY MOUTH THREE TIMES DAILY AS NEEDED FOR CONSTIPATION ( TAKE AS NEEDED TO MAINTAIN 3 TO 4 BOWEL MOVEMENTS DAILY) 4/26/19  Yes Gonzalo Wahl MD   folic acid (FOLVITE) 1 MG tablet Take 1 tablet (1 mg) by  mouth daily 6/7/19  Yes Tian Perez MD   furosemide (LASIX) 20 MG tablet Take 1 tablet (20 mg) by mouth daily 4/15/19  Yes Celestino Verduzco PA-C   MULTIPLE VITAMINS PO Take 1 tablet by mouth daily   Yes Reported, Patient   pantoprazole (PROTONIX) 40 MG EC tablet Take 1 tablet (40 mg) by mouth every morning (before breakfast) 6/8/19  Yes Tian Perez MD   potassium chloride ER (K-DUR/KLOR-CON M) 20 MEQ CR tablet Take 2 tablets (40 mEq) by mouth daily 6/17/19  Yes Too Washington DO   sodium bicarbonate 650 MG tablet Take 1 tablet (650 mg) by mouth 3 times daily 6/7/19  Yes Tian Perez MD   spironolactone (ALDACTONE) 100 MG tablet Take 1.5 tablets (150 mg) by mouth daily 6/7/19  Yes Tian Perez MD   vitamin B1 (THIAMINE) 100 MG tablet Take 1 tablet (100 mg) by mouth daily 6/14/19  Yes Rosette Wills MD   vitamin D3 (CHOLECALCIFEROL) 2000 units (50 mcg) tablet Take 1 tablet by mouth daily   Yes Unknown, Entered By History   XIFAXAN 550 MG TABS tablet TAKE ONE TABLET BY MOUTH TWICE A DAY 6/13/19  Yes Rosette Wills MD       Allergies  Allergies   Allergen Reactions     Prednisone Visual Disturbance     Trazodone Visual Disturbance     Benadryl [Diphenhydramine] Other (See Comments)     Delirium (visual and auditory hallucinations)     Oxycodone Other (See Comments)     Delirium and constipation       Review of systems  A 10-point review of systems was negative    Examination  /69 (BP Location: Right arm, Patient Position: Sitting, Cuff Size: Adult Regular)   Pulse 81   Temp 97.8  F (36.6  C) (Oral)   Wt 73.2 kg (161 lb 6.4 oz)   SpO2 100%   BMI 23.16 kg/m    Body mass index is 23.16 kg/m .    Gen- well, NAD, A+Ox3, normal color  CVS- RRR  RS- CTA  Abd- soft, non-tender, no ascites or organomegaly on palpation or percussion, BS+  Extr- hands normal, no BESSY  Skin- no rash or jaundice  Neuro- no asterixis  Psych- normal mood    Laboratory  Lab Results    Component Value Date     07/22/2019    POTASSIUM 4.1 07/22/2019    CHLORIDE 108 07/22/2019    CO2 22 07/22/2019    BUN 13 07/22/2019    CR 1.17 07/22/2019       Lab Results   Component Value Date    BILITOTAL 4.4 07/22/2019    ALT 22 07/22/2019    AST 46 07/22/2019    ALKPHOS 132 07/22/2019       Lab Results   Component Value Date    ALBUMIN 2.7 07/22/2019    PROTTOTAL 5.7 07/22/2019        Lab Results   Component Value Date    WBC 3.3 07/22/2019    HGB 10.5 07/22/2019    MCV 95 07/22/2019    PLT 58 07/22/2019       Lab Results   Component Value Date    INR 1.93 07/22/2019       Radiology  Abd U/S Jun 2019 reviewed  Renal U/S Jul 2019 reviewed- no right hydronephrosis, small free fluid    Assessment  53 year old male who presents for follow-up of decompensated alcoholic cirrhosis complicated with variceal bleed, ascites and hepatic encephalopathy after recent admission to hospital with alcoholic hepatitis.  MELD-Na= 22 (improved).  Last ETOH= Feb 2019.  Ascites resolved with sobriety and diuretics.  Will reduce diuretic doses now due to side effects.  Hepatic encephalopathy adequately managed with current medications.  Will refer to nephrology for follow-up of RTA.  Will need counseling to maintain sobriety to preserve remaining liver function.  Up to date with HCC screening.    We discussed the importance of maintenance of sobriety in order to improve and maintain liver function.  We also discussed the importance to start any program to help maintain sobriety given the patient's prior issues with recidivism and the liver transplant program's requirement for a minimum of 6 months (sometimes 12 months) sobriety prior to consideration for evaluation for liver transplant evaluation.    Plan  1.  Complete abstinence from alcohol  2.  Behavioral/spiritual health referral  3.  Discontinue furosemide  4.  Continue spironolactone 150mg PO Q24 for now  5.  Check BMP in 1 week  6.  Continue lactulose and rifaximin  7.   Nephrology referral for follow-up of RTA  8.  Follow-up in 3 months as scheduled    Gonzalo Bales MD  Hepatology  [facility]

## 2019-07-23 NOTE — TELEPHONE ENCOUNTER
[facility] Call Center    Phone Message    May a detailed message be left on voicemail: yes    Reason for Call: Other: Courtney returning Vaishali's call.  Please call her back at your earliest convenience     Action Taken: Message routed to:  [facility]

## 2019-07-23 NOTE — TELEPHONE ENCOUNTER
[facility] Call Center    Phone Message    May a detailed message be left on voicemail: yes    Reason for Call: Other: Courtney from [facility] called back, please call back this message is for Vaishali Goff     Action Taken: Message routed to:  [facility] hepatology

## 2019-07-30 ENCOUNTER — PATIENT OUTREACH (OUTPATIENT)
Dept: GASTROENTEROLOGY | Facility: CLINIC | Age: 54
End: 2019-07-30

## 2019-07-30 NOTE — PROGRESS NOTES
Patient was to have labs rechecked this week, not done yet. He does have an appointment on 8/2 for labs and to see nephrology. Will check in with patient again after this appointment. Called to check in with patient today. There was no answer, message left requesting call back.

## 2019-08-01 DIAGNOSIS — I10 HYPERTENSION GOAL BP (BLOOD PRESSURE) < 140/90: Primary | ICD-10-CM

## 2019-08-02 ENCOUNTER — OFFICE VISIT (OUTPATIENT)
Dept: NEPHROLOGY | Facility: CLINIC | Age: 54
End: 2019-08-02
Payer: COMMERCIAL

## 2019-08-02 VITALS
WEIGHT: 167.1 LBS | SYSTOLIC BLOOD PRESSURE: 113 MMHG | DIASTOLIC BLOOD PRESSURE: 69 MMHG | TEMPERATURE: 97.7 F | BODY MASS INDEX: 23.98 KG/M2 | HEART RATE: 75 BPM | OXYGEN SATURATION: 100 %

## 2019-08-02 DIAGNOSIS — N20.0 NEPHROLITHIASIS: ICD-10-CM

## 2019-08-02 DIAGNOSIS — I10 HYPERTENSION GOAL BP (BLOOD PRESSURE) < 140/90: ICD-10-CM

## 2019-08-02 DIAGNOSIS — N17.9 ACUTE KIDNEY INJURY (H): Primary | ICD-10-CM

## 2019-08-02 DIAGNOSIS — K70.31 ALCOHOLIC CIRRHOSIS OF LIVER WITH ASCITES (H): ICD-10-CM

## 2019-08-02 DIAGNOSIS — N25.89 RENAL TUBULAR ACIDOSIS: ICD-10-CM

## 2019-08-02 LAB
ALBUMIN SERPL-MCNC: 2.8 G/DL (ref 3.4–5)
ANION GAP SERPL CALCULATED.3IONS-SCNC: 6 MMOL/L (ref 3–14)
BUN SERPL-MCNC: 11 MG/DL (ref 7–30)
CALCIUM SERPL-MCNC: 9 MG/DL (ref 8.5–10.1)
CHLORIDE SERPL-SCNC: 110 MMOL/L (ref 94–109)
CO2 SERPL-SCNC: 22 MMOL/L (ref 20–32)
CREAT SERPL-MCNC: 1.18 MG/DL (ref 0.66–1.25)
FERRITIN SERPL-MCNC: 123 NG/ML (ref 26–388)
GFR SERPL CREATININE-BSD FRML MDRD: 69 ML/MIN/{1.73_M2}
GLUCOSE SERPL-MCNC: 89 MG/DL (ref 70–99)
HGB BLD-MCNC: 11.2 G/DL (ref 13.3–17.7)
IRON SATN MFR SERPL: 47 % (ref 15–46)
IRON SERPL-MCNC: 113 UG/DL (ref 35–180)
PHOSPHATE SERPL-MCNC: 2.8 MG/DL (ref 2.5–4.5)
POTASSIUM SERPL-SCNC: 4.3 MMOL/L (ref 3.4–5.3)
PTH-INTACT SERPL-MCNC: <7 PG/ML (ref 18–80)
SODIUM SERPL-SCNC: 138 MMOL/L (ref 133–144)
TIBC SERPL-MCNC: 239 UG/DL (ref 240–430)

## 2019-08-02 PROCEDURE — 82728 ASSAY OF FERRITIN: CPT

## 2019-08-02 PROCEDURE — 80069 RENAL FUNCTION PANEL: CPT

## 2019-08-02 PROCEDURE — 83970 ASSAY OF PARATHORMONE: CPT

## 2019-08-02 PROCEDURE — 85018 HEMOGLOBIN: CPT

## 2019-08-02 PROCEDURE — 83540 ASSAY OF IRON: CPT

## 2019-08-02 PROCEDURE — 36415 COLL VENOUS BLD VENIPUNCTURE: CPT

## 2019-08-02 PROCEDURE — G0463 HOSPITAL OUTPT CLINIC VISIT: HCPCS | Mod: ZF

## 2019-08-02 PROCEDURE — 83550 IRON BINDING TEST: CPT

## 2019-08-02 ASSESSMENT — PAIN SCALES - GENERAL: PAINLEVEL: NO PAIN (0)

## 2019-08-02 NOTE — PATIENT INSTRUCTIONS
1. Decrease bicarb to twice daily  2. Decrease Potassium to one per day  3. Increase potassium in diet  4. Stay hydrated. Try for at least 2 liters per day

## 2019-08-02 NOTE — LETTER
8/2/2019      RE: Ivan Bell  [address] [facility] [address]       Nephrology Clinic Visit 8/2/19    Assessment and Plan:    1. AMBER - Resolving. Creat 1.1, peak creat 1.4. Baseline creat ~ 1.0. No difficulty voiding.   Etiology of AMBER felt to be poor oral intake prior to admission 2/2 HE.    - Has h/o recurrent AMBER   - Reviewed AMBER vs CKD and patient's risk factors for developing AMBER and ultimately CKD    2. Nephrolithiasis - Bilateral kidney stones noted on renal US 6/19. Non obstructing. Has gout. Has never had stone analysis. Fluid intake limited by need to restrict fluid due to cirrhosis. No h/o stents/lithotripsy. Passes stones regularly.    - Discussed referral to Dr Moses for stone management    3. RTA2 - Started on Bicarb and Potassium during inpatient stay. Spironolactone dose was increased. Bicarb dropped to 14 the day following admission.    - Currently on Bicarb 650 mg TID. Bicarb 22. No diarrhea   - Decrease Bicarb to 650 mg bid   - Currently on KCL 20 meq bid. Potassium is 4.3.    - Decrease Potassium to 20 meq every day    4. ETOH cirrhosis c/b HE/EV - Sober since 2/19. T bili 4.4, down from 9.2 upon admission, but was in the 20's in 4/19. On Lactulose/Rifaximin/Thiamine and Spironolactone 150 mg every day    5. HTN - Blood pressure well controlled. Clinic readings 113-126/69-73.  Only on Spironolactone.     6. Nutrition - Appetite is improving. Albumin 2.8, Phos 2.8    7. Vit D def - Ca 9.0, Vit D level 7 (4/19)   - Continue Cholecalciferol    8. Anemia - Hgb 11.2   - Iron studies 7/19 replete    9. Disposition - RTC 1 month for f/u    Assessment and plan was discussed with patient and he voiced his understanding and agreement.      Reason for Visit:  Post hospital AMBER/RTA    HPI:  Mr Bell is a 54 yo male with ETOH cirrhosis, Ascites s/p TIPS, EV with bleed 10/17, Nephrolithiasis, Recurrent AMBER, Sober since 2/19 with recent hospital admission 6/4-6/17/19 after he stopped his  Lactulose with Hepatic encephalopathy, RTA and AMBER with peak creat of 1.4. Baseline creat ~ 1.0 with recurrent AMBER.     ROS:   Feeling well.   Denies CP, dyspnea, abdominal pain.   Not on a regular paracentesis schedule  Taking the Lactulose   Trying to stay hydrated to prevent kidney stones but on a fluid restriction given his cirrhosis  Appetite is good  Energy is good. Continues to work in construction  No voiding concerns  No edema  ,   Chronic Health Problems:  Recurrent AMBER  Hypertriglyceridemia  ETOH cirrhosis  Hepatic encephalopathy  Esophageal varices  Nephrolithiasis  Gout  HTN  ED  Vit D def    Family Hx:   Family History   Problem Relation Age of Onset     Family History Negative Mother      Family History Negative Father      Hypertension Father      Cerebrovascular Disease Father 87     Breast Cancer Maternal Grandmother      Rheumatoid Arthritis Daughter      Depression Daughter      Cancer - colorectal No family hx of      Prostate Cancer No family hx of      Liver Disease No family hx of      Personal Hx:   , works in construction, 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   Substance Use Topics     Alcohol use: No     Alcohol/week: 10.5 oz     Types: 21 Cans of beer per week     Comment: last etoh 16, did have Odouls ~2017       Allergies:  Allergies   Allergen Reactions     Prednisone Visual Disturbance     Trazodone Visual Disturbance     Benadryl [Diphenhydramine] Other (See Comments)     Delirium (visual and auditory hallucinations)     Oxycodone Other (See Comments)     Delirium and constipation       Medications:  Current Outpatient Medications   Medication Sig     CONSTULOSE 10 GM/15ML solution TAKE 30MLS BY MOUTH THREE TIMES  DAILY AS NEEDED FOR CONSTIPATION ( TAKE AS NEEDED TO MAINTAIN 3 TO 4 BOWEL MOVEMENTS DAILY)     folic acid (FOLVITE) 1 MG tablet Take 1 tablet (1 mg) by mouth daily     MULTIPLE VITAMINS PO Take 1 tablet by mouth daily     pantoprazole (PROTONIX) 40 MG EC tablet Take 1 tablet (40 mg) by mouth every morning (before breakfast)     potassium chloride ER (K-DUR/KLOR-CON M) 20 MEQ CR tablet Take 20 mEq by mouth daily     sodium bicarbonate 650 MG tablet Take 1 tablet (650 mg) by mouth 2 times daily     spironolactone (ALDACTONE) 100 MG tablet Take 1.5 tablets (150 mg) by mouth daily     vitamin B1 (THIAMINE) 100 MG tablet Take 1 tablet (100 mg) by mouth daily     vitamin D3 (CHOLECALCIFEROL) 2000 units (50 mcg) tablet Take 1 tablet by mouth daily     XIFAXAN 550 MG TABS tablet TAKE ONE TABLET BY MOUTH TWICE A DAY     No current facility-administered medications for this visit.       Vitals:  /69   Pulse 75   Temp 97.7  F (36.5  C)   Wt 75.8 kg (167 lb 1.6 oz)   SpO2 100%   BMI 23.98 kg/m       Exam:  GEN: Pleasant male in NAD. Wife present  CARDIAC: RRR  LUNGS: CTA  ABDOMEN: Soft, NT, non distended  EXT: No edema  NEURO: A/O    LABS:   CMP  Recent Labs   Lab Test 08/02/19  0708 07/22/19  0702 06/17/19  1507 06/14/19  0800    136 141 138   POTASSIUM 4.3 4.1 4.0 2.6*   CHLORIDE 110* 108 111* 109   CO2 22 22 22 21   ANIONGAP 6 6 8 8   GLC 89 90 158* 164*   BUN 11 13 7 11   CR 1.18 1.17 1.25 1.17   GFRESTIMATED 69 69 64 69   GFRESTBLACK 80 80 74 80   JULISSA 9.0 8.4* 9.7 9.5     Recent Labs   Lab Test 07/22/19  0702 06/17/19  1507 06/07/19  0644 06/06/19  0609   BILITOTAL 4.4* 6.7* 8.0* 6.8*   ALKPHOS 132 186* 127 111   ALT 22 17 14 14   AST 46* 40 37 38     CBC  Recent Labs   Lab Test 08/02/19  0708 07/22/19  0702 06/17/19  1507 06/14/19  0800 06/07/19  0644   HGB 11.2* 10.5* 9.7* 9.8* 8.6*   WBC  --  3.3* 4.2 3.4* 2.7*   RBC  --  3.43* 3.17* 3.17* 2.78*   HCT  --  32.6* 30.7* 30.5* 26.6*   MCV  --  95 97 96 96    MCH  --  30.6 30.6 30.9 30.9   MCHC  --  32.2 31.6 32.1 32.3   RDW  --  16.7* 18.1* 18.2* 18.2*   PLT  --  58* 75* 65* 55*     URINE STUDIES  Recent Labs   Lab Test 06/06/19  1215 06/05/19  0615 03/14/19  1300 03/11/19  1040  12/27/13  0918 12/18/12  1642   COLOR Yellow Yellow Dark Yellow Dark Yellow   < > Yellow Yellow   APPEARANCE Clear Clear Slightly Cloudy Clear   < > Clear Clear   URINEGLC Negative Negative Negative Negative   < > Negative Negative   URINEBILI Negative Negative Large* Large*   < > Negative Negative   URINEKETONE Negative Negative Negative Negative   < > Negative 15*   SG 1.007 1.007 1.009 1.007   < > 1.020 1.015   UBLD Moderate* Moderate* Small* Moderate*   < > Negative Trace*   URINEPH 6.5 7.5* 6.5 7.5*   < > 6.0 6.0   PROTEIN Negative 10* Negative Negative   < > Negative Negative   UROBILINOGEN  --   --   --   --   --  1.0 0.2   NITRITE Negative Negative Negative Negative   < > Negative Negative   LEUKEST Moderate* Large* Small* Small*   < > Trace* Negative   RBCU 68* 136* 3* 164*   < > O - 2 O - 2   WBCU 19* 58* 5 13*   < > O - 2 O - 2    < > = values in this interval not displayed.     No lab results found.  PTH  Recent Labs   Lab Test 08/02/19  0708   PTHI <7*     IRON STUDIES  Recent Labs   Lab Test 08/02/19  0708 02/25/16  1630   IRON 113 51   * 180*   IRONSAT 47* 28   JOAQUIN 123 1,306*       Ryanne Obrien, NP

## 2019-08-02 NOTE — NURSING NOTE
"Chief Complaint   Patient presents with     RECHECK     Referred DR Bales Med changes     Vital signs:  Temp: 97.7  F (36.5  C)   BP: 113/69 Pulse: 75     SpO2: 100 %       Weight: 75.8 kg (167 lb 1.6 oz)  Estimated body mass index is 23.98 kg/m  as calculated from the following:    Height as of 6/24/19: 1.778 m (5' 10\").    Weight as of this encounter: 75.8 kg (167 lb 1.6 oz).        Nancy Alegria cma    "

## 2019-08-04 RX ORDER — SODIUM BICARBONATE 650 MG/1
650 TABLET ORAL 2 TIMES DAILY
Qty: 90 TABLET | Refills: 1
Start: 2019-08-04 | End: 2019-08-12

## 2019-08-05 NOTE — PROGRESS NOTES
Patient had labs and saw nephrology on 8/2/19. Lab results were reviewed at that appointment. It was noted that he had no edema, and non distended abdomen. He reported good appetite, good energy, has been somewhat restricting fluid, taking lactulose. Johanna Camille NP reduced bicarb to twice daily, decreased potassium to one per day, increase potassium in diet, and try to take in about 2 liters of fluid per day. Patient's wife called and left message, reporting that he has been doing well. He has scheduled a behavioral health appointment with Guru Frank on 8/9/19, will return to nephrology 8/30/19, and will be back to see Dr. Bales on 10/7/19.

## 2019-08-05 NOTE — PROGRESS NOTES
Nephrology Clinic Visit 8/2/19    Assessment and Plan:    1. AMBER - Resolving. Creat 1.1, peak creat 1.4. Baseline creat ~ 1.0. No difficulty voiding.   Etiology of AMBER felt to be poor oral intake prior to admission 2/2 HE.    - Has h/o recurrent AMBER   - Reviewed AMBER vs CKD and patient's risk factors for developing AMBER and ultimately CKD    2. Nephrolithiasis - Bilateral kidney stones noted on renal US 6/19. Non obstructing. Has gout. Has never had stone analysis. Fluid intake limited by need to restrict fluid due to cirrhosis. No h/o stents/lithotripsy. Passes stones regularly.    - Discussed referral to Dr Moses for stone management    3. RTA2 - Started on Bicarb and Potassium during inpatient stay. Spironolactone dose was increased. Bicarb dropped to 14 the day following admission.    - Currently on Bicarb 650 mg TID. Bicarb 22. No diarrhea   - Decrease Bicarb to 650 mg bid   - Currently on KCL 20 meq bid. Potassium is 4.3.    - Decrease Potassium to 20 meq every day    4. ETOH cirrhosis c/b HE/EV - Sober since 2/19. T bili 4.4, down from 9.2 upon admission, but was in the 20's in 4/19. On Lactulose/Rifaximin/Thiamine and Spironolactone 150 mg every day    5. HTN - Blood pressure well controlled. Clinic readings 113-126/69-73.  Only on Spironolactone.     6. Nutrition - Appetite is improving. Albumin 2.8, Phos 2.8    7. Vit D def - Ca 9.0, Vit D level 7 (4/19)   - Continue Cholecalciferol    8. Anemia - Hgb 11.2   - Iron studies 7/19 replete    9. Disposition - RTC 1 month for f/u    Assessment and plan was discussed with patient and he voiced his understanding and agreement.      Reason for Visit:  Post hospital AMBER/RTA    HPI:  Mr Bell is a 52 yo male with ETOH cirrhosis, Ascites s/p TIPS, EV with bleed 10/17, Nephrolithiasis, Recurrent AMBER, Sober since 2/19 with recent hospital admission 6/4-6/17/19 after he stopped his Lactulose with Hepatic encephalopathy, RTA and AMBER with peak creat of 1.4. Baseline  creat ~ 1.0 with recurrent AMBER.     ROS:   Feeling well.   Denies CP, dyspnea, abdominal pain.   Not on a regular paracentesis schedule  Taking the Lactulose   Trying to stay hydrated to prevent kidney stones but on a fluid restriction given his cirrhosis  Appetite is good  Energy is good. Continues to work in construction  No voiding concerns  No edema  ,   Chronic Health Problems:  Recurrent AMBER  Hypertriglyceridemia  ETOH cirrhosis  Hepatic encephalopathy  Esophageal varices  Nephrolithiasis  Gout  HTN  ED  Vit D def    Family Hx:   Family History   Problem Relation Age of Onset     Family History Negative Mother      Family History Negative Father      Hypertension Father      Cerebrovascular Disease Father 87     Breast Cancer Maternal Grandmother      Rheumatoid Arthritis Daughter      Depression Daughter      Cancer - colorectal No family hx of      Prostate Cancer No family hx of      Liver Disease No family hx of      Personal Hx:   , works in construction, 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   Substance Use Topics     Alcohol use: No     Alcohol/week: 10.5 oz     Types: 21 Cans of beer per week     Comment: last etoh 14, did have Odouls ~2017       Allergies:  Allergies   Allergen Reactions     Prednisone Visual Disturbance     Trazodone Visual Disturbance     Benadryl [Diphenhydramine] Other (See Comments)     Delirium (visual and auditory hallucinations)     Oxycodone Other (See Comments)     Delirium and constipation       Medications:  Current Outpatient Medications   Medication Sig     CONSTULOSE 10 GM/15ML solution TAKE 30MLS BY MOUTH THREE TIMES DAILY AS NEEDED FOR CONSTIPATION ( TAKE AS NEEDED TO MAINTAIN 3 TO 4 BOWEL MOVEMENTS  DAILY)     folic acid (FOLVITE) 1 MG tablet Take 1 tablet (1 mg) by mouth daily     MULTIPLE VITAMINS PO Take 1 tablet by mouth daily     pantoprazole (PROTONIX) 40 MG EC tablet Take 1 tablet (40 mg) by mouth every morning (before breakfast)     potassium chloride ER (K-DUR/KLOR-CON M) 20 MEQ CR tablet Take 20 mEq by mouth daily     sodium bicarbonate 650 MG tablet Take 1 tablet (650 mg) by mouth 2 times daily     spironolactone (ALDACTONE) 100 MG tablet Take 1.5 tablets (150 mg) by mouth daily     vitamin B1 (THIAMINE) 100 MG tablet Take 1 tablet (100 mg) by mouth daily     vitamin D3 (CHOLECALCIFEROL) 2000 units (50 mcg) tablet Take 1 tablet by mouth daily     XIFAXAN 550 MG TABS tablet TAKE ONE TABLET BY MOUTH TWICE A DAY     No current facility-administered medications for this visit.       Vitals:  /69   Pulse 75   Temp 97.7  F (36.5  C)   Wt 75.8 kg (167 lb 1.6 oz)   SpO2 100%   BMI 23.98 kg/m      Exam:  GEN: Pleasant male in NAD. Wife present  CARDIAC: RRR  LUNGS: CTA  ABDOMEN: Soft, NT, non distended  EXT: No edema  NEURO: A/O    LABS:   CMP  Recent Labs   Lab Test 08/02/19  0708 07/22/19  0702 06/17/19  1507 06/14/19  0800    136 141 138   POTASSIUM 4.3 4.1 4.0 2.6*   CHLORIDE 110* 108 111* 109   CO2 22 22 22 21   ANIONGAP 6 6 8 8   GLC 89 90 158* 164*   BUN 11 13 7 11   CR 1.18 1.17 1.25 1.17   GFRESTIMATED 69 69 64 69   GFRESTBLACK 80 80 74 80   JULISSA 9.0 8.4* 9.7 9.5     Recent Labs   Lab Test 07/22/19  0702 06/17/19  1507 06/07/19  0644 06/06/19  0609   BILITOTAL 4.4* 6.7* 8.0* 6.8*   ALKPHOS 132 186* 127 111   ALT 22 17 14 14   AST 46* 40 37 38     CBC  Recent Labs   Lab Test 08/02/19  0708 07/22/19  0702 06/17/19  1507 06/14/19  0800 06/07/19  0644   HGB 11.2* 10.5* 9.7* 9.8* 8.6*   WBC  --  3.3* 4.2 3.4* 2.7*   RBC  --  3.43* 3.17* 3.17* 2.78*   HCT  --  32.6* 30.7* 30.5* 26.6*   MCV  --  95 97 96 96   MCH  --  30.6 30.6 30.9 30.9   MCHC  --  32.2 31.6 32.1 32.3   RDW  --  16.7* 18.1*  18.2* 18.2*   PLT  --  58* 75* 65* 55*     URINE STUDIES  Recent Labs   Lab Test 06/06/19  1215 06/05/19  0615 03/14/19  1300 03/11/19  1040  12/27/13  0918 12/18/12  1642   COLOR Yellow Yellow Dark Yellow Dark Yellow   < > Yellow Yellow   APPEARANCE Clear Clear Slightly Cloudy Clear   < > Clear Clear   URINEGLC Negative Negative Negative Negative   < > Negative Negative   URINEBILI Negative Negative Large* Large*   < > Negative Negative   URINEKETONE Negative Negative Negative Negative   < > Negative 15*   SG 1.007 1.007 1.009 1.007   < > 1.020 1.015   UBLD Moderate* Moderate* Small* Moderate*   < > Negative Trace*   URINEPH 6.5 7.5* 6.5 7.5*   < > 6.0 6.0   PROTEIN Negative 10* Negative Negative   < > Negative Negative   UROBILINOGEN  --   --   --   --   --  1.0 0.2   NITRITE Negative Negative Negative Negative   < > Negative Negative   LEUKEST Moderate* Large* Small* Small*   < > Trace* Negative   RBCU 68* 136* 3* 164*   < > O - 2 O - 2   WBCU 19* 58* 5 13*   < > O - 2 O - 2    < > = values in this interval not displayed.     No lab results found.  PTH  Recent Labs   Lab Test 08/02/19  0708   PTHI <7*     IRON STUDIES  Recent Labs   Lab Test 08/02/19  0708 02/25/16  1630   IRON 113 51   * 180*   IRONSAT 47* 28   JOAQUIN 123 1,306*       Ryanne Obrien, NP

## 2019-08-19 DIAGNOSIS — Z95.828 S/P TIPS (TRANSJUGULAR INTRAHEPATIC PORTOSYSTEMIC SHUNT): ICD-10-CM

## 2019-08-19 DIAGNOSIS — K70.31 ALCOHOLIC CIRRHOSIS OF LIVER WITH ASCITES (H): ICD-10-CM

## 2019-08-19 RX ORDER — SPIRONOLACTONE 100 MG/1
150 TABLET, FILM COATED ORAL DAILY
Qty: 45 TABLET | Refills: 1 | Status: SHIPPED | OUTPATIENT
Start: 2019-08-19 | End: 2019-08-30

## 2019-08-19 NOTE — TELEPHONE ENCOUNTER
Elicia called requesting refills for spironolactone, protonix, and folic acid. Will fill spironolactone per clinic protocol, and will discuss need for protonix and folic acid with Dr. Bales.  Addendum: Per Dr. Bales, discontinue the protonix and folic acid. Informed patient's wife Elicia and she verbalized understanding.

## 2019-08-21 ENCOUNTER — PATIENT OUTREACH (OUTPATIENT)
Dept: GASTROENTEROLOGY | Facility: CLINIC | Age: 54
End: 2019-08-21

## 2019-08-21 NOTE — PROGRESS NOTES
Subjective     Ivan Bell is a 53 year old male who presents to clinic today for the following health issues:    HPI   Chief Complaint   Patient presents with     Gastrointestinal Problem     epigastric pain off/on x3 weeks     *declined tdap today    Patient Active Problem List   Diagnosis     Hypertriglyceridemia     Gouty arthropathy     Erectile dysfunction     CARDIOVASCULAR SCREENING; LDL GOAL LESS THAN 130     24 hour contact given to patient      Hypertension goal BP (blood pressure) < 140/90     Alcoholic cirrhosis of liver with ascites (H)     Calculus of gallbladder without cholecystitis     Nephrolithiasis     Decompensation of cirrhosis of liver (H)     Encephalopathy     Past Surgical History:   Procedure Laterality Date     ANKLE SURGERY Left      COLONOSCOPY N/A 3/31/2016    Procedure: COLONOSCOPY;  Surgeon: Rhys Uriostegui MD;  Location:  GI     ESOPHAGOSCOPY, GASTROSCOPY, DUODENOSCOPY (EGD), COMBINED N/A 3/31/2016    Procedure: COMBINED ESOPHAGOSCOPY, GASTROSCOPY, DUODENOSCOPY (EGD);  Surgeon: Rhys Uriostegui MD;  Location:  GI     ESOPHAGOSCOPY, GASTROSCOPY, DUODENOSCOPY (EGD), COMBINED N/A 3/9/2018    Procedure: COMBINED ESOPHAGOSCOPY, GASTROSCOPY, DUODENOSCOPY (EGD), BIOPSY SINGLE OR MULTIPLE;  EGD;  Surgeon: Gonzalo Wahl MD;  Location:  GI     ESOPHAGOSCOPY, GASTROSCOPY, DUODENOSCOPY (EGD), COMBINED N/A 2019    Procedure: ESOPHAGOGASTRODUODENOSCOPY (EGD);  Surgeon: Gonzalo Wahl MD;  Location:  GI     KNEE SURGERY Left      KNEE SURGERY Right      SIGMOIDOSCOPY FLEXIBLE N/A 10/31/2017    Procedure: SIGMOIDOSCOPY FLEXIBLE;;  Surgeon: Armaan Adams MD;  Location:  GI     TIPS Procedure  2018       Social History     Tobacco Use     Smoking status: Former Smoker     Types: Dip, chew, snus or snuff     Last attempt to quit: 1998     Years since quittin.9     Smokeless tobacco: Current User     Last attempt to quit:  10/24/2017     Tobacco comment: 1 tin per 10 days.   Substance Use Topics     Alcohol use: No     Alcohol/week: 10.5 oz     Types: 21 Cans of beer per week     Comment: last etoh 2/14/16, did have Odouls ~8/2017     Family History   Problem Relation Age of Onset     Family History Negative Mother      Family History Negative Father      Hypertension Father      Cerebrovascular Disease Father 87     Breast Cancer Maternal Grandmother      Rheumatoid Arthritis Daughter      Depression Daughter      Cancer - colorectal No family hx of      Prostate Cancer No family hx of      Liver Disease No family hx of          Current Outpatient Medications   Medication Sig Dispense Refill     CONSTULOSE 10 GM/15ML solution TAKE 30MLS BY MOUTH THREE TIMES DAILY AS NEEDED FOR CONSTIPATION ( TAKE AS NEEDED TO MAINTAIN 3 TO 4 BOWEL MOVEMENTS DAILY) 1000 mL 11     folic acid (FOLVITE) 1 MG tablet Take 1 tablet (1 mg) by mouth daily 30 tablet 1     MULTIPLE VITAMINS PO Take 1 tablet by mouth daily       pantoprazole (PROTONIX) 40 MG EC tablet Take 1 tablet (40 mg) by mouth every morning (before breakfast) 30 tablet 1     potassium chloride ER (K-DUR/KLOR-CON M) 20 MEQ CR tablet Take 20 mEq by mouth daily 30 tablet 1     sodium bicarbonate 650 MG tablet Take 1 tablet (650 mg) by mouth 2 times daily 180 tablet 3     spironolactone (ALDACTONE) 100 MG tablet Take 1.5 tablets (150 mg) by mouth daily 45 tablet 1     vitamin B1 (THIAMINE) 100 MG tablet Take 1 tablet (100 mg) by mouth daily 30 tablet 0     vitamin D3 (CHOLECALCIFEROL) 2000 units (50 mcg) tablet Take 1 tablet by mouth daily       XIFAXAN 550 MG TABS tablet TAKE ONE TABLET BY MOUTH TWICE A  tablet 0     Allergies   Allergen Reactions     Prednisone Visual Disturbance     Trazodone Visual Disturbance     Benadryl [Diphenhydramine] Other (See Comments)     Delirium (visual and auditory hallucinations)     Oxycodone Other (See Comments)     Delirium and constipation       1.  "Stomach pain: Intermittent.  Gets worse laying on right side.  Ongoing for the past 3 weeks.  Feels like gas.  Epigastric.  Can last 1/2-1 hour.  Gets better when laying down on back or left side.  Unchanged over the past 3 weeks.  8/10 pain and even hard to breath.  Not associated with food. Occurs 3-4 days per day  Can occur when patient is sleeping.  In regards to medications, furosemide was discontinued by his gastroenterologist.  History of liver cirrhosis.  Last drink was February 2019.  He was seen by nephrology and had potassium dosage and bicarb dosage decreased.  Feels like a bubble.  States that bowel movements are every 3-4 hours.     Review of Systems   Constitutional: Negative for chills and fever.   HENT: Negative for congestion, ear pain, hearing loss and sore throat.    Respiratory: Negative for cough and shortness of breath.    Cardiovascular: Negative for chest pain, palpitations and peripheral edema.   Gastrointestinal: Positive for abdominal distention (mild) and abdominal pain (intermittent). Negative for constipation, diarrhea, hematochezia, nausea and vomiting.   Musculoskeletal: Negative for arthralgias, joint swelling and myalgias.   Skin: Negative for rash.   Neurological: Negative for dizziness, weakness, headaches and paresthesias.   Psychiatric/Behavioral: Negative for mood changes. The patient is not nervous/anxious.             Objective    /68 (BP Location: Left arm, Cuff Size: Adult Regular)   Pulse 90   Temp 97.2  F (36.2  C) (Tympanic)   Ht 1.778 m (5' 10\")   Wt 76.2 kg (168 lb)   SpO2 100%   BMI 24.11 kg/m    Body mass index is 24.11 kg/m .  Physical Exam   Constitutional: He is oriented to person, place, and time. He appears well-developed and well-nourished. No distress.   HENT:   Head: Normocephalic and atraumatic.   Nose: Nose normal.   Eyes: Conjunctivae and EOM are normal.   Neck: Normal range of motion. No tracheal deviation present.   Cardiovascular: Normal " rate, regular rhythm and normal heart sounds.   Pulmonary/Chest: Effort normal. He has no wheezes.   Abdominal:   Slightly distended, mild tenderness involving the epigastric region   Musculoskeletal: Normal range of motion.   Neurological: He is alert and oriented to person, place, and time.   Skin: No rash noted. No erythema.   Psychiatric: He has a normal mood and affect. His behavior is normal.          Assessment & Plan     1. Epigastric pain  History of liver cirrhosis due to alcohol.  Has been alcohol free since February 2019.  Would like to rule out hepatitis, pancreatitis, peritonitis, cholecystitis, versus GERD.  Does not appear due to SBO.  Will treat for GERD.  Advised to go directly to ER if symptoms get worse.   - Comprehensive metabolic panel (BMP + Alb, Alk Phos, ALT, AST, Total. Bili, TP)  - CBC with platelets and differential  - Lipase    2. Gastritis with hemorrhage, unspecified chronicity, unspecified gastritis type  - pantoprazole (PROTONIX) 40 MG EC tablet; Take 1 tablet (40 mg) by mouth every morning (before breakfast)  Dispense: 90 tablet; Refill: 3     See Patient Instructions    No follow-ups on file.    Too Washington DO  [facility]

## 2019-08-21 NOTE — PROGRESS NOTES
Elicia called to gather further information regarding patient's recent onset of symptoms. Over the weekend patient began having shaking hands, which they attributed to missing doses of lactulose while out of town. However, the shaking continues along with a weak  and aching pain, especially at night, in bilateral hands. He reports that he works with his hands at his job, but no more than usual. He wonders if there is anything else he can do or try to help this.    Per Dr. Bales, hold the spironolactone for a couple of days to see if the hand symptoms improve.  If they do, we will stop the spironolactone.  Elicia also describes a small bulging at his umbilicus that is painless and purplish. Discussed umbilical hernia care including support with exertion and symptoms which would require immediate attention. She states he does have an appointment to see his PCP this Friday. Elicia verbalized understanding and will call to update clinic in a few days.

## 2019-08-23 ENCOUNTER — OFFICE VISIT (OUTPATIENT)
Dept: FAMILY MEDICINE | Facility: CLINIC | Age: 54
End: 2019-08-23
Payer: COMMERCIAL

## 2019-08-23 VITALS
OXYGEN SATURATION: 100 % | SYSTOLIC BLOOD PRESSURE: 120 MMHG | TEMPERATURE: 97.2 F | BODY MASS INDEX: 24.05 KG/M2 | WEIGHT: 168 LBS | DIASTOLIC BLOOD PRESSURE: 68 MMHG | HEIGHT: 70 IN | HEART RATE: 90 BPM

## 2019-08-23 DIAGNOSIS — R10.13 EPIGASTRIC PAIN: Primary | ICD-10-CM

## 2019-08-23 DIAGNOSIS — K29.71 GASTRITIS WITH HEMORRHAGE, UNSPECIFIED CHRONICITY, UNSPECIFIED GASTRITIS TYPE: ICD-10-CM

## 2019-08-23 LAB
ALBUMIN SERPL-MCNC: 2.6 G/DL (ref 3.4–5)
ALP SERPL-CCNC: 136 U/L (ref 40–150)
ALT SERPL W P-5'-P-CCNC: 23 U/L (ref 0–70)
ANION GAP SERPL CALCULATED.3IONS-SCNC: 6 MMOL/L (ref 3–14)
AST SERPL W P-5'-P-CCNC: 46 U/L (ref 0–45)
BASOPHILS # BLD AUTO: 0 10E9/L (ref 0–0.2)
BASOPHILS NFR BLD AUTO: 0.5 %
BILIRUB SERPL-MCNC: 4.5 MG/DL (ref 0.2–1.3)
BUN SERPL-MCNC: 12 MG/DL (ref 7–30)
CALCIUM SERPL-MCNC: 8.7 MG/DL (ref 8.5–10.1)
CHLORIDE SERPL-SCNC: 108 MMOL/L (ref 94–109)
CO2 SERPL-SCNC: 21 MMOL/L (ref 20–32)
CREAT SERPL-MCNC: 1.05 MG/DL (ref 0.66–1.25)
DIFFERENTIAL METHOD BLD: ABNORMAL
EOSINOPHIL # BLD AUTO: 0.2 10E9/L (ref 0–0.7)
EOSINOPHIL NFR BLD AUTO: 5.7 %
ERYTHROCYTE [DISTWIDTH] IN BLOOD BY AUTOMATED COUNT: 17.2 % (ref 10–15)
GFR SERPL CREATININE-BSD FRML MDRD: 79 ML/MIN/{1.73_M2}
GLUCOSE SERPL-MCNC: 94 MG/DL (ref 70–99)
HCT VFR BLD AUTO: 34.8 % (ref 40–53)
HGB BLD-MCNC: 11.2 G/DL (ref 13.3–17.7)
IMM GRANULOCYTES # BLD: 0 10E9/L (ref 0–0.4)
IMM GRANULOCYTES NFR BLD: 0.2 %
LIPASE SERPL-CCNC: 206 U/L (ref 73–393)
LYMPHOCYTES # BLD AUTO: 0.6 10E9/L (ref 0.8–5.3)
LYMPHOCYTES NFR BLD AUTO: 13 %
MCH RBC QN AUTO: 30.4 PG (ref 26.5–33)
MCHC RBC AUTO-ENTMCNC: 32.2 G/DL (ref 31.5–36.5)
MCV RBC AUTO: 94 FL (ref 78–100)
MONOCYTES # BLD AUTO: 0.5 10E9/L (ref 0–1.3)
MONOCYTES NFR BLD AUTO: 11.1 %
NEUTROPHILS # BLD AUTO: 3 10E9/L (ref 1.6–8.3)
NEUTROPHILS NFR BLD AUTO: 69.5 %
NRBC # BLD AUTO: 0 10*3/UL
NRBC BLD AUTO-RTO: 0 /100
PLATELET # BLD AUTO: 63 10E9/L (ref 150–450)
POTASSIUM SERPL-SCNC: 3.9 MMOL/L (ref 3.4–5.3)
PROT SERPL-MCNC: 6.1 G/DL (ref 6.8–8.8)
RBC # BLD AUTO: 3.69 10E12/L (ref 4.4–5.9)
SODIUM SERPL-SCNC: 135 MMOL/L (ref 133–144)
WBC # BLD AUTO: 4.2 10E9/L (ref 4–11)

## 2019-08-23 PROCEDURE — 36415 COLL VENOUS BLD VENIPUNCTURE: CPT | Performed by: FAMILY MEDICINE

## 2019-08-23 PROCEDURE — 99214 OFFICE O/P EST MOD 30 MIN: CPT | Performed by: FAMILY MEDICINE

## 2019-08-23 PROCEDURE — 85025 COMPLETE CBC W/AUTO DIFF WBC: CPT | Performed by: FAMILY MEDICINE

## 2019-08-23 PROCEDURE — 80053 COMPREHEN METABOLIC PANEL: CPT | Performed by: FAMILY MEDICINE

## 2019-08-23 PROCEDURE — 83690 ASSAY OF LIPASE: CPT | Performed by: FAMILY MEDICINE

## 2019-08-23 RX ORDER — PANTOPRAZOLE SODIUM 40 MG/1
40 TABLET, DELAYED RELEASE ORAL
Qty: 90 TABLET | Refills: 3 | Status: SHIPPED | OUTPATIENT
Start: 2019-08-23 | End: 2019-08-26 | Stop reason: SINTOL

## 2019-08-23 ASSESSMENT — ENCOUNTER SYMPTOMS
ABDOMINAL DISTENTION: 1
NERVOUS/ANXIOUS: 0
ARTHRALGIAS: 0
WEAKNESS: 0
JOINT SWELLING: 0
HEADACHES: 0
SHORTNESS OF BREATH: 0
FEVER: 0
DIARRHEA: 0
SORE THROAT: 0
MYALGIAS: 0
HEMATOCHEZIA: 0
CONSTIPATION: 0
CHILLS: 0
DIZZINESS: 0
VOMITING: 0
PALPITATIONS: 0
ABDOMINAL PAIN: 1
COUGH: 0
NAUSEA: 0
PARESTHESIAS: 0

## 2019-08-23 ASSESSMENT — MIFFLIN-ST. JEOR: SCORE: 1603.29

## 2019-08-23 NOTE — PATIENT INSTRUCTIONS
Chiquita Bell,    Thank you for allowing [facility] to manage your care.    I ordered some blood work, please go to the laboratory to get your laboratory studies.    I sent your prescriptions to your pharmacy.    Please avoid foods or drinks which contain citrus (tomato, pineapple, lime, lemon, etc), caffeine, chocolate, and spicy foods.  Avoid eating late at night.     Please allow 1-2 business days for our office to call you in regards to your laboratory/radiological studies.  If not done so, I encourage you to login into [email or web address] ([email or web address] to review your results as well.     If you have any questions or concerns, please feel free to call us at [phone].    Sincerely,    Dr. Washington

## 2019-08-26 ENCOUNTER — TELEPHONE (OUTPATIENT)
Dept: FAMILY MEDICINE | Facility: CLINIC | Age: 54
End: 2019-08-26

## 2019-08-26 DIAGNOSIS — K21.9 GASTROESOPHAGEAL REFLUX DISEASE WITHOUT ESOPHAGITIS: Primary | ICD-10-CM

## 2019-08-26 NOTE — TELEPHONE ENCOUNTER
Lets do trial of zantac  (please go over medication instruction with patient).  Stop protonix.  Rx sent to pharmacy.     1. Gastroesophageal reflux disease without esophagitis  - ranitidine (ZANTAC) 150 MG capsule; Take 1 capsule (150 mg) by mouth 2 times daily  Dispense: 60 capsule; Refill: 3

## 2019-08-26 NOTE — TELEPHONE ENCOUNTER
Spoke with patient to relay recent lab results and he asked that I forward a message to his PCP regarding the medication that was prescribed on 8/23 pantoprazole. He had spoken with a nurse from the [facility] gastroenterology earlier today that told him he should not take this medication because he had tried it in the past and had been shaking so badly. Patient is hoping that there is an acceptable alternative to try.    Lida Manzanares CMA ([facility])

## 2019-08-27 ENCOUNTER — TELEPHONE (OUTPATIENT)
Dept: GASTROENTEROLOGY | Facility: CLINIC | Age: 54
End: 2019-08-27

## 2019-08-27 NOTE — TELEPHONE ENCOUNTER
Left message with patient regarding Rx sent to pharmacy for zantac and for patient to stop protonix  Patient advised to call back with questions.    Luz Marina Deleon RN

## 2019-08-27 NOTE — TELEPHONE ENCOUNTER
----- Message from Gonzalo Miramontes MD sent at 8/26/2019  6:00 PM CDT -----  We can stop the spironolactone altogether.    I stopped the protonix as there was no indication for it at the time.  If he is feeling better with it restarted, he can keep taking it.    Thanks    ----- Message -----  From: Sarah Avila RN  Sent: 8/26/2019  11:41 AM  To: Gonzalo Miramontes MD    Hi, I am following up on this pt while Mary is out. Spironolactone was recently held due to hand symptoms. Spoke with pt's wife, Elicia, who stated that the pt's hands have improved slightly and pt does not appear to have fluid retention. Do you want to discontinue the spironolactone?    Also, pt's PCP put him back on protonix due to pt's epigastric pain and I know that was just discontinued by you last week. Should I advise the pt to stop it?    ThanksSarah

## 2019-08-27 NOTE — TELEPHONE ENCOUNTER
Left a voicemail on the home number regarding the ok to stop spironolactone altogether as there is no fluid retention while off the medication. Also mentioned that Dr. Bales is ok for patient to continue taking Protonix as prescribed by the patient's PCP if Protonix is helping.

## 2019-08-29 ENCOUNTER — TELEPHONE (OUTPATIENT)
Dept: NEPHROLOGY | Facility: CLINIC | Age: 54
End: 2019-08-29

## 2019-08-29 NOTE — TELEPHONE ENCOUNTER
Called Elicia to confirm she received the message from Albetro Lopez RN. Elicia stated that she did and pt will stop taking the spironolactone. Elicia will review protonix and whether pt feels the medication is helping with symptoms. Pt will continue to take if helping. Encouraged Elicia to call the clinic with any additional questions/concerns.

## 2019-08-30 ENCOUNTER — OFFICE VISIT (OUTPATIENT)
Dept: NEPHROLOGY | Facility: CLINIC | Age: 54
End: 2019-08-30
Payer: COMMERCIAL

## 2019-08-30 VITALS
DIASTOLIC BLOOD PRESSURE: 70 MMHG | SYSTOLIC BLOOD PRESSURE: 130 MMHG | OXYGEN SATURATION: 99 % | TEMPERATURE: 98.3 F | WEIGHT: 179.5 LBS | BODY MASS INDEX: 25.76 KG/M2 | HEART RATE: 85 BPM

## 2019-08-30 DIAGNOSIS — E87.6 HYPOKALEMIA: ICD-10-CM

## 2019-08-30 DIAGNOSIS — N20.0 NEPHROLITHIASIS: ICD-10-CM

## 2019-08-30 DIAGNOSIS — N17.9 ACUTE KIDNEY INJURY (H): Primary | ICD-10-CM

## 2019-08-30 DIAGNOSIS — K70.31 ALCOHOLIC CIRRHOSIS OF LIVER WITH ASCITES (H): Primary | ICD-10-CM

## 2019-08-30 DIAGNOSIS — N17.9 ACUTE KIDNEY INJURY (H): ICD-10-CM

## 2019-08-30 LAB
ALBUMIN SERPL-MCNC: 2.5 G/DL (ref 3.4–5)
ANION GAP SERPL CALCULATED.3IONS-SCNC: 7 MMOL/L (ref 3–14)
BUN SERPL-MCNC: 10 MG/DL (ref 7–30)
CALCIUM SERPL-MCNC: 8.3 MG/DL (ref 8.5–10.1)
CHLORIDE SERPL-SCNC: 116 MMOL/L (ref 94–109)
CO2 SERPL-SCNC: 19 MMOL/L (ref 20–32)
CREAT SERPL-MCNC: 1.04 MG/DL (ref 0.66–1.25)
GFR SERPL CREATININE-BSD FRML MDRD: 80 ML/MIN/{1.73_M2}
GLUCOSE SERPL-MCNC: 103 MG/DL (ref 70–99)
PHOSPHATE SERPL-MCNC: 2.5 MG/DL (ref 2.5–4.5)
POTASSIUM SERPL-SCNC: 3.2 MMOL/L (ref 3.4–5.3)
SODIUM SERPL-SCNC: 142 MMOL/L (ref 133–144)
URATE SERPL-MCNC: 4.9 MG/DL (ref 3.5–7.2)

## 2019-08-30 PROCEDURE — 84550 ASSAY OF BLOOD/URIC ACID: CPT

## 2019-08-30 PROCEDURE — G0463 HOSPITAL OUTPT CLINIC VISIT: HCPCS | Mod: ZF

## 2019-08-30 PROCEDURE — 80069 RENAL FUNCTION PANEL: CPT

## 2019-08-30 PROCEDURE — 36415 COLL VENOUS BLD VENIPUNCTURE: CPT

## 2019-08-30 RX ORDER — PANTOPRAZOLE SODIUM 40 MG/1
TABLET, DELAYED RELEASE ORAL
Refills: 0 | COMMUNITY
Start: 2019-08-23 | End: 2019-08-30

## 2019-08-30 ASSESSMENT — PAIN SCALES - GENERAL: PAINLEVEL: NO PAIN (0)

## 2019-08-30 NOTE — LETTER
8/30/2019      RE: Ivan Bell  [address] [facility] [id number]       Nephrology Clinic Visit 8/30/19    Assessment and Plan:    1. AMBER - Resolved. Creat 1.0, peak creat 1.4. Baseline creat ~ 1.0. No difficulty voiding.   Etiology of AMBER felt to be poor oral intake prior to admission 2/2 HE.    - Has h/o recurrent AMBER   - Reviewed AMBER vs CKD and patient's risk factors for developing AMBER and ultimately CKD    2. Nephrolithiasis - Bilateral kidney stones noted on renal US 6/19. Non obstructing. Has gout. Has never had stone analysis. Fluid intake limited by need to restrict fluid due to cirrhosis. No h/o stents/lithotripsy. Passes stones regularly.    - Discussed referral to Dr Moses for stone management    3. RTA2 - Started on Bicarb and Potassium during inpatient stay. Spironolactone dose was increased, but has now been discontinued due to tremors. Bicarb dropped to 14 the day following admission.    - Bicarb was decreased to 650 mg BID last visit. The f/u bicarb was 21. He is down to 19 today in setting of gastroenteritis/diarrhea.    - Continue current Bicarb dose of 650 mg bid   - KCL decreased to 20 meq every day last visit and f/u K was 3.9. In the interim his Spironolactone was discontinued and he has developed diarrhea due to gastroenteritis.     - Take KCL 40 meq today. Advised to increase his K containing foods.     4. ETOH cirrhosis c/b HE/EV - Sober since 2/19. T bili 4.5, down from 9.2 upon admission, but was in the 20's in 4/19. On Lactulose/Rifaximin/Thiamine. Spironolactone discontinued by Dr Bales    5. HTN - Blood pressure well controlled. Clinic readings 129-132/70=76.     - Off Spironolactone.     6. Nutrition - Appetite is improving. Albumin 2.5, Phos 2.5    7. Vit D def - Ca corrected 9.5, Vit D level 7 (4/19)   - Continue Cholecalciferol    8. Anemia - Hgb 11.2   - Iron studies 7/19 replete    9. Disposition - No further scheduled Nephrology f/u required.     Assessment and  plan was discussed with patient and he voiced his understanding and agreement.    Reason for Visit:  AMBER/RTA    HPI:  Mr Bell is a 53 yo male with ETOH cirrhosis, Ascites s/p TIPS, EV with bleed 10/17, Nephrolithiasis, Recurrent AMBER, Sober since 2/19 with recent hospital admission 6/4-6/17/19 after he stopped his Lactulose with Hepatic encephalopathy, RTA and AMBER with peak creat of 1.4. Baseline creat ~ 1.0 with recurrent AMBER.     ROS:   Was feeling well until his birthday dinner last night. He and his wife shared a salad and now they both have diarrhea. Patient notes liquid stools. No abdominal pain, fever, blood. Has had 3 liquid stools so far today. Prior to this event, had been having 2-3 formed stools/day  Patient notes his Spironolactone was discontinued last week after he developed severe tremors. The tremors were so severe he had trouble handling any utensils. He was frightened to drive. Since stopping Spironolactone symptoms have resolved.   No kidney stone events  Denies CP, dyspnea  Not on a regular paracentesis schedule  Taking the Lactulose   Trying to stay hydrated to prevent kidney stones but on a fluid restriction given his cirrhosis  Appetite is good  Energy is good. Continues to work in construction  No voiding concerns  Edema has increased since stopping Spironolactone  ,   Chronic Health Problems:  Recurrent AMBER  Hypertriglyceridemia  ETOH cirrhosis  Hepatic encephalopathy  Esophageal varices  Nephrolithiasis  Gout  HTN  ED  Vit D def    Family Hx:   Family History   Problem Relation Age of Onset     Family History Negative Mother      Family History Negative Father      Hypertension Father      Cerebrovascular Disease Father 87     Breast Cancer Maternal Grandmother      Rheumatoid Arthritis Daughter      Depression Daughter      Cancer - colorectal No family hx of      Prostate Cancer No family hx of      Liver Disease No family hx of      Personal Hx:   , works in construction, 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   Substance Use Topics     Alcohol use: No     Alcohol/week: 10.5 oz     Types: 21 Cans of beer per week     Comment: last etoh 14, did have Odouls ~2017       Allergies:  Allergies   Allergen Reactions     Prednisone Visual Disturbance     Trazodone Visual Disturbance     Benadryl [Diphenhydramine] Other (See Comments)     Delirium (visual and auditory hallucinations)     Oxycodone Other (See Comments)     Delirium and constipation       Medications:  Current Outpatient Medications   Medication Sig     CONSTULOSE 10 GM/15ML solution TAKE 30MLS BY MOUTH THREE TIMES DAILY AS NEEDED FOR CONSTIPATION ( TAKE AS NEEDED TO MAINTAIN 3 TO 4 BOWEL MOVEMENTS DAILY)     MULTIPLE VITAMINS PO Take 1 tablet by mouth daily     potassium chloride ER (K-DUR/KLOR-CON M) 20 MEQ CR tablet Take 20 mEq by mouth daily     sodium bicarbonate 650 MG tablet Take 1 tablet (650 mg) by mouth 2 times daily     vitamin B1 (THIAMINE) 100 MG tablet Take 1 tablet (100 mg) by mouth daily     vitamin D3 (CHOLECALCIFEROL) 2000 units (50 mcg) tablet Take 1 tablet by mouth daily     XIFAXAN 550 MG TABS tablet TAKE ONE TABLET BY MOUTH TWICE A DAY     No current facility-administered medications for this visit.       Vitals:  /70   Pulse 85   Temp 98.3  F (36.8  C)   Wt 81.4 kg (179 lb 8 oz)   SpO2 99%   BMI 25.76 kg/m       Exam:  GEN: Pleasant male in NAD. Wife present  CARDIAC: RRR  LUNGS: CTA  ABDOMEN: Soft, NT, non distended  EXT: 1+ edema  NEURO: A/O    LABS:   CMP  Recent Labs   Lab Test 19  0819 19  1131 19  0708 19  0702    135 138 136   POTASSIUM 3.2* 3.9 4.3 4.1   CHLORIDE 116* 108 110* 108   CO2 19* 21     ANIONGAP 7 6 6 6   * 94 89 90   BUN 10 12 11 13   CR 1.04 1.05 1.18 1.17   GFRESTIMATED 80 79 69 69   GFRESTBLACK >90 >90 80 80   JULISSA 8.3* 8.7 9.0 8.4*     Recent Labs   Lab Test 08/23/19  1131 07/22/19  0702 06/17/19  1507 06/07/19  0644   BILITOTAL 4.5* 4.4* 6.7* 8.0*   ALKPHOS 136 132 186* 127   ALT 23 22 17 14   AST 46* 46* 40 37     CBC  Recent Labs   Lab Test 08/23/19  1131 08/02/19  0708 07/22/19  0702 06/17/19  1507 06/14/19  0800   HGB 11.2* 11.2* 10.5* 9.7* 9.8*   WBC 4.2  --  3.3* 4.2 3.4*   RBC 3.69*  --  3.43* 3.17* 3.17*   HCT 34.8*  --  32.6* 30.7* 30.5*   MCV 94  --  95 97 96   MCH 30.4  --  30.6 30.6 30.9   MCHC 32.2  --  32.2 31.6 32.1   RDW 17.2*  --  16.7* 18.1* 18.2*   PLT 63*  --  58* 75* 65*     URINE STUDIES  Recent Labs   Lab Test 06/06/19  1215 06/05/19  0615 03/14/19  1300 03/11/19  1040  12/27/13  0918 12/18/12  1642   COLOR Yellow Yellow Dark Yellow Dark Yellow   < > Yellow Yellow   APPEARANCE Clear Clear Slightly Cloudy Clear   < > Clear Clear   URINEGLC Negative Negative Negative Negative   < > Negative Negative   URINEBILI Negative Negative Large* Large*   < > Negative Negative   URINEKETONE Negative Negative Negative Negative   < > Negative 15*   SG 1.007 1.007 1.009 1.007   < > 1.020 1.015   UBLD Moderate* Moderate* Small* Moderate*   < > Negative Trace*   URINEPH 6.5 7.5* 6.5 7.5*   < > 6.0 6.0   PROTEIN Negative 10* Negative Negative   < > Negative Negative   UROBILINOGEN  --   --   --   --   --  1.0 0.2   NITRITE Negative Negative Negative Negative   < > Negative Negative   LEUKEST Moderate* Large* Small* Small*   < > Trace* Negative   RBCU 68* 136* 3* 164*   < > O - 2 O - 2   WBCU 19* 58* 5 13*   < > O - 2 O - 2    < > = values in this interval not displayed.     No lab results found.  PTH  Recent Labs   Lab Test 08/02/19  0708   PTHI <7*     IRON STUDIES  Recent Labs   Lab Test 08/02/19  0708 02/25/16  1630   IRON 113 51   * 180*   IRONSAT 47* 28   JOAQUIN 123  1,306*       Ryanne Obrien, NP

## 2019-08-30 NOTE — PROGRESS NOTES
Nephrology Clinic Visit 8/30/19    Assessment and Plan:    1. AMBER - Resolved. Creat 1.0, peak creat 1.4. Baseline creat ~ 1.0. No difficulty voiding.   Etiology of AMBER felt to be poor oral intake prior to admission 2/2 HE.    - Has h/o recurrent AMBER   - Reviewed AMBER vs CKD and patient's risk factors for developing AMBER and ultimately CKD    2. Nephrolithiasis - Bilateral kidney stones noted on renal US 6/19. Non obstructing. Has gout. Has never had stone analysis. Fluid intake limited by need to restrict fluid due to cirrhosis. No h/o stents/lithotripsy. Passes stones regularly.    - Discussed referral to Dr Moses for stone management    3. RTA2 - Started on Bicarb and Potassium during inpatient stay. Spironolactone dose was increased, but has now been discontinued due to tremors. Bicarb dropped to 14 the day following admission.    - Bicarb was decreased to 650 mg BID last visit. The f/u bicarb was 21. He is down to 19 today in setting of gastroenteritis/diarrhea.    - Continue current Bicarb dose of 650 mg bid   - KCL decreased to 20 meq every day last visit and f/u K was 3.9. In the interim his Spironolactone was discontinued and he has developed diarrhea due to gastroenteritis.     - Take KCL 40 meq today. Advised to increase his K containing foods.     4. ETOH cirrhosis c/b HE/EV - Sober since 2/19. T bili 4.5, down from 9.2 upon admission, but was in the 20's in 4/19. On Lactulose/Rifaximin/Thiamine. Spironolactone discontinued by Dr Bales    5. HTN - Blood pressure well controlled. Clinic readings 129-132/70=76.     - Off Spironolactone.     6. Nutrition - Appetite is improving. Albumin 2.5, Phos 2.5    7. Vit D def - Ca corrected 9.5, Vit D level 7 (4/19)   - Continue Cholecalciferol    8. Anemia - Hgb 11.2   - Iron studies 7/19 replete    9. Disposition - No further scheduled Nephrology f/u required.     Assessment and plan was discussed with patient and he voiced his understanding and agreement.    Reason  for Visit:  AMBER/RTA    HPI:  Mr Bell is a 55 yo male with ETOH cirrhosis, Ascites s/p TIPS, EV with bleed 10/17, Nephrolithiasis, Recurrent AMBER, Sober since 2/19 with recent hospital admission 6/4-6/17/19 after he stopped his Lactulose with Hepatic encephalopathy, RTA and AMBER with peak creat of 1.4. Baseline creat ~ 1.0 with recurrent AMBER.     ROS:   Was feeling well until his birthday dinner last night. He and his wife shared a salad and now they both have diarrhea. Patient notes liquid stools. No abdominal pain, fever, blood. Has had 3 liquid stools so far today. Prior to this event, had been having 2-3 formed stools/day  Patient notes his Spironolactone was discontinued last week after he developed severe tremors. The tremors were so severe he had trouble handling any utensils. He was frightened to drive. Since stopping Spironolactone symptoms have resolved.   No kidney stone events  Denies CP, dyspnea  Not on a regular paracentesis schedule  Taking the Lactulose   Trying to stay hydrated to prevent kidney stones but on a fluid restriction given his cirrhosis  Appetite is good  Energy is good. Continues to work in construction  No voiding concerns  Edema has increased since stopping Spironolactone  ,   Chronic Health Problems:  Recurrent AMBER  Hypertriglyceridemia  ETOH cirrhosis  Hepatic encephalopathy  Esophageal varices  Nephrolithiasis  Gout  HTN  ED  Vit D def    Family Hx:   Family History   Problem Relation Age of Onset     Family History Negative Mother      Family History Negative Father      Hypertension Father      Cerebrovascular Disease Father 87     Breast Cancer Maternal Grandmother      Rheumatoid Arthritis Daughter      Depression Daughter      Cancer - colorectal No family hx of      Prostate Cancer No family hx of      Liver Disease No family hx of      Personal Hx:   , works in construction, 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   Substance Use Topics     Alcohol use: No     Alcohol/week: 10.5 oz     Types: 21 Cans of beer per week     Comment: last etoh 14, did have Odouls ~2017       Allergies:  Allergies   Allergen Reactions     Prednisone Visual Disturbance     Trazodone Visual Disturbance     Benadryl [Diphenhydramine] Other (See Comments)     Delirium (visual and auditory hallucinations)     Oxycodone Other (See Comments)     Delirium and constipation       Medications:  Current Outpatient Medications   Medication Sig     CONSTULOSE 10 GM/15ML solution TAKE 30MLS BY MOUTH THREE TIMES DAILY AS NEEDED FOR CONSTIPATION ( TAKE AS NEEDED TO MAINTAIN 3 TO 4 BOWEL MOVEMENTS DAILY)     MULTIPLE VITAMINS PO Take 1 tablet by mouth daily     potassium chloride ER (K-DUR/KLOR-CON M) 20 MEQ CR tablet Take 20 mEq by mouth daily     sodium bicarbonate 650 MG tablet Take 1 tablet (650 mg) by mouth 2 times daily     vitamin B1 (THIAMINE) 100 MG tablet Take 1 tablet (100 mg) by mouth daily     vitamin D3 (CHOLECALCIFEROL) 2000 units (50 mcg) tablet Take 1 tablet by mouth daily     XIFAXAN 550 MG TABS tablet TAKE ONE TABLET BY MOUTH TWICE A DAY     No current facility-administered medications for this visit.       Vitals:  /70   Pulse 85   Temp 98.3  F (36.8  C)   Wt 81.4 kg (179 lb 8 oz)   SpO2 99%   BMI 25.76 kg/m      Exam:  GEN: Pleasant male in NAD. Wife present  CARDIAC: RRR  LUNGS: CTA  ABDOMEN: Soft, NT, non distended  EXT: 1+ edema  NEURO: A/O    LABS:   CMP  Recent Labs   Lab Test 19  0819 19  1131 19  0708 19  0702    135 138 136   POTASSIUM 3.2* 3.9 4.3 4.1   CHLORIDE 116* 108 110* 108   CO2 19* 21 22 22   ANIONGAP 7 6 6 6   * 94 89 90   BUN 10 12 11 13   CR 1.04 1.05 1.18 1.17    GFRESTIMATED 80 79 69 69   GFRESTBLACK >90 >90 80 80   JULISSA 8.3* 8.7 9.0 8.4*     Recent Labs   Lab Test 08/23/19  1131 07/22/19  0702 06/17/19  1507 06/07/19  0644   BILITOTAL 4.5* 4.4* 6.7* 8.0*   ALKPHOS 136 132 186* 127   ALT 23 22 17 14   AST 46* 46* 40 37     CBC  Recent Labs   Lab Test 08/23/19  1131 08/02/19  0708 07/22/19  0702 06/17/19  1507 06/14/19  0800   HGB 11.2* 11.2* 10.5* 9.7* 9.8*   WBC 4.2  --  3.3* 4.2 3.4*   RBC 3.69*  --  3.43* 3.17* 3.17*   HCT 34.8*  --  32.6* 30.7* 30.5*   MCV 94  --  95 97 96   MCH 30.4  --  30.6 30.6 30.9   MCHC 32.2  --  32.2 31.6 32.1   RDW 17.2*  --  16.7* 18.1* 18.2*   PLT 63*  --  58* 75* 65*     URINE STUDIES  Recent Labs   Lab Test 06/06/19  1215 06/05/19  0615 03/14/19  1300 03/11/19  1040  12/27/13  0918 12/18/12  1642   COLOR Yellow Yellow Dark Yellow Dark Yellow   < > Yellow Yellow   APPEARANCE Clear Clear Slightly Cloudy Clear   < > Clear Clear   URINEGLC Negative Negative Negative Negative   < > Negative Negative   URINEBILI Negative Negative Large* Large*   < > Negative Negative   URINEKETONE Negative Negative Negative Negative   < > Negative 15*   SG 1.007 1.007 1.009 1.007   < > 1.020 1.015   UBLD Moderate* Moderate* Small* Moderate*   < > Negative Trace*   URINEPH 6.5 7.5* 6.5 7.5*   < > 6.0 6.0   PROTEIN Negative 10* Negative Negative   < > Negative Negative   UROBILINOGEN  --   --   --   --   --  1.0 0.2   NITRITE Negative Negative Negative Negative   < > Negative Negative   LEUKEST Moderate* Large* Small* Small*   < > Trace* Negative   RBCU 68* 136* 3* 164*   < > O - 2 O - 2   WBCU 19* 58* 5 13*   < > O - 2 O - 2    < > = values in this interval not displayed.     No lab results found.  PTH  Recent Labs   Lab Test 08/02/19  0708   PTHI <7*     IRON STUDIES  Recent Labs   Lab Test 08/02/19  0708 02/25/16  1630   IRON 113 51   * 180*   IRONSAT 47* 28   JOAQUIN 123 1,306*       Ryanne Obrien, NP

## 2019-08-30 NOTE — PATIENT INSTRUCTIONS
1. Take potassium meq today and eat bananas!  2. Follow BRAT diet until diarrhea subsides ( bananas, rice, applesauce, toast)

## 2019-08-30 NOTE — NURSING NOTE
"Chief Complaint   Patient presents with     RECHECK     Follow Up Cirrhosis     Vital signs:  Temp: 98.3  F (36.8  C)   BP: 130/70 Pulse: 85     SpO2: 99 %       Weight: 81.4 kg (179 lb 8 oz)  Estimated body mass index is 25.76 kg/m  as calculated from the following:    Height as of 8/23/19: 1.778 m (5' 10\").    Weight as of this encounter: 81.4 kg (179 lb 8 oz).        Nancy Alegria, [facility]    "

## 2019-09-24 DIAGNOSIS — K76.82 HEPATIC ENCEPHALOPATHY (H): ICD-10-CM

## 2019-09-24 DIAGNOSIS — K74.60 DECOMPENSATION OF CIRRHOSIS OF LIVER (H): ICD-10-CM

## 2019-09-24 DIAGNOSIS — K72.90 DECOMPENSATION OF CIRRHOSIS OF LIVER (H): ICD-10-CM

## 2019-09-25 NOTE — TELEPHONE ENCOUNTER
Requested Prescriptions   Pending Prescriptions Disp Refills     Thiamine HCl (B-1) 100 MG TABS [Pharmacy Med Name: B-1 100MG TABS]  Last Written Prescription Date:  6/14/19  Last Fill Quantity: 30,  # refills: 0   Last office visit: 8/23/2019 with prescribing provider:  yesi Rosas Office Visit:     110 tablet 0     Sig: TAKE ONE TABLET BY MOUTH ONCE DAILY       There is no refill protocol information for this order        XIFAXAN 550 MG TABS tablet [Pharmacy Med Name: XIFAXAN 550MG TABS]  Last Written Prescription Date:  6/13/19  Last Fill Quantity: 180,  # refills: 0   Last office visit: 8/23/2019 with prescribing provider:  yesi Rosas Office Visit:     180 tablet 0     Sig: TAKE ONE TABLET BY MOUTH TWICE A DAY       There is no refill protocol information for this order

## 2019-09-25 NOTE — TELEPHONE ENCOUNTER
Routing refill request to provider for review/approval because:  Drug not on the [facility] refill protocol     Maile Crocker RN

## 2019-09-27 RX ORDER — RIFAXIMIN 550 MG/1
TABLET ORAL
Qty: 180 TABLET | Refills: 0 | Status: SHIPPED | OUTPATIENT
Start: 2019-09-27 | End: 2019-12-13

## 2019-09-27 RX ORDER — METHION/INOS/CHOL BT/B COM/LIV 110MG-86MG
CAPSULE ORAL
Qty: 110 TABLET | Refills: 0 | Status: SHIPPED | OUTPATIENT
Start: 2019-09-27 | End: 2019-12-13

## 2019-09-30 ENCOUNTER — PATIENT OUTREACH (OUTPATIENT)
Dept: GASTROENTEROLOGY | Facility: CLINIC | Age: 54
End: 2019-09-30

## 2019-09-30 NOTE — PROGRESS NOTES
Patient's wife Elicia called for help in scheduling a paracentesis appointment on Monday 10/7 after his appointment with Dr. Bales. [facility] was able to get him scheduled at 12:00 that day, Elicia aware. She states he is beginning to look more distended, but is not uncomfortable at this time. He is no longer taking spironolactone per instructions and conversations from the end of August. He will further discuss this at his appointment.

## 2019-10-04 ENCOUNTER — TELEPHONE (OUTPATIENT)
Dept: GASTROENTEROLOGY | Facility: CLINIC | Age: 54
End: 2019-10-04

## 2019-10-04 NOTE — TELEPHONE ENCOUNTER
Left message for pt reminding them of upcoming appointment.  Instructed pt to bring updated medications list.  Ivonne Ogden, CMA

## 2019-10-07 ENCOUNTER — OFFICE VISIT (OUTPATIENT)
Dept: GASTROENTEROLOGY | Facility: CLINIC | Age: 54
End: 2019-10-07
Attending: INTERNAL MEDICINE
Payer: COMMERCIAL

## 2019-10-07 ENCOUNTER — ANCILLARY PROCEDURE (OUTPATIENT)
Dept: ULTRASOUND IMAGING | Facility: CLINIC | Age: 54
End: 2019-10-07
Attending: PHYSICIAN ASSISTANT
Payer: COMMERCIAL

## 2019-10-07 ENCOUNTER — OFFICE VISIT (OUTPATIENT)
Dept: INFUSION THERAPY | Facility: CLINIC | Age: 54
End: 2019-10-07
Attending: PHYSICIAN ASSISTANT
Payer: COMMERCIAL

## 2019-10-07 VITALS
WEIGHT: 173 LBS | SYSTOLIC BLOOD PRESSURE: 131 MMHG | OXYGEN SATURATION: 100 % | BODY MASS INDEX: 24.82 KG/M2 | DIASTOLIC BLOOD PRESSURE: 76 MMHG

## 2019-10-07 VITALS
RESPIRATION RATE: 18 BRPM | SYSTOLIC BLOOD PRESSURE: 147 MMHG | HEART RATE: 84 BPM | TEMPERATURE: 98.1 F | DIASTOLIC BLOOD PRESSURE: 81 MMHG | OXYGEN SATURATION: 100 % | BODY MASS INDEX: 26.17 KG/M2 | HEIGHT: 70 IN | WEIGHT: 182.8 LBS

## 2019-10-07 DIAGNOSIS — K70.31 ALCOHOLIC CIRRHOSIS OF LIVER WITH ASCITES (H): ICD-10-CM

## 2019-10-07 DIAGNOSIS — K70.31 ALCOHOLIC CIRRHOSIS OF LIVER WITH ASCITES (H): Primary | ICD-10-CM

## 2019-10-07 PROBLEM — G93.40 ENCEPHALOPATHY: Status: RESOLVED | Noted: 2019-06-04 | Resolved: 2019-10-07

## 2019-10-07 LAB
ALBUMIN SERPL-MCNC: 2.3 G/DL (ref 3.4–5)
ALP SERPL-CCNC: 140 U/L (ref 40–150)
ALT SERPL W P-5'-P-CCNC: 15 U/L (ref 0–70)
ANION GAP SERPL CALCULATED.3IONS-SCNC: 5 MMOL/L (ref 3–14)
AST SERPL W P-5'-P-CCNC: 36 U/L (ref 0–45)
BILIRUB DIRECT SERPL-MCNC: 1.9 MG/DL (ref 0–0.2)
BILIRUB SERPL-MCNC: 4.4 MG/DL (ref 0.2–1.3)
BUN SERPL-MCNC: 9 MG/DL (ref 7–30)
CALCIUM SERPL-MCNC: 8.1 MG/DL (ref 8.5–10.1)
CHLORIDE SERPL-SCNC: 115 MMOL/L (ref 94–109)
CO2 SERPL-SCNC: 22 MMOL/L (ref 20–32)
CREAT SERPL-MCNC: 1.32 MG/DL (ref 0.66–1.25)
ERYTHROCYTE [DISTWIDTH] IN BLOOD BY AUTOMATED COUNT: 17.8 % (ref 10–15)
GFR SERPL CREATININE-BSD FRML MDRD: 60 ML/MIN/{1.73_M2}
GLUCOSE SERPL-MCNC: 146 MG/DL (ref 70–99)
HCT VFR BLD AUTO: 31.7 % (ref 40–53)
HGB BLD-MCNC: 10.2 G/DL (ref 13.3–17.7)
INR PPP: 2.18 (ref 0.86–1.14)
MCH RBC QN AUTO: 30 PG (ref 26.5–33)
MCHC RBC AUTO-ENTMCNC: 32.2 G/DL (ref 31.5–36.5)
MCV RBC AUTO: 93 FL (ref 78–100)
PLATELET # BLD AUTO: 61 10E9/L (ref 150–450)
POTASSIUM SERPL-SCNC: 3.5 MMOL/L (ref 3.4–5.3)
PROT SERPL-MCNC: 5.4 G/DL (ref 6.8–8.8)
RBC # BLD AUTO: 3.4 10E12/L (ref 4.4–5.9)
SODIUM SERPL-SCNC: 142 MMOL/L (ref 133–144)
WBC # BLD AUTO: 3.4 10E9/L (ref 4–11)

## 2019-10-07 PROCEDURE — 36415 COLL VENOUS BLD VENIPUNCTURE: CPT | Performed by: INTERNAL MEDICINE

## 2019-10-07 PROCEDURE — P9047 ALBUMIN (HUMAN), 25%, 50ML: HCPCS | Mod: ZF | Performed by: PHYSICIAN ASSISTANT

## 2019-10-07 PROCEDURE — 25000128 H RX IP 250 OP 636: Mod: ZF | Performed by: PHYSICIAN ASSISTANT

## 2019-10-07 PROCEDURE — G0463 HOSPITAL OUTPT CLINIC VISIT: HCPCS | Mod: 25

## 2019-10-07 PROCEDURE — 85027 COMPLETE CBC AUTOMATED: CPT | Performed by: INTERNAL MEDICINE

## 2019-10-07 PROCEDURE — 25000125 ZZHC RX 250: Mod: ZF | Performed by: PHYSICIAN ASSISTANT

## 2019-10-07 PROCEDURE — 27210190 US PARACENTESIS

## 2019-10-07 PROCEDURE — 80048 BASIC METABOLIC PNL TOTAL CA: CPT | Performed by: INTERNAL MEDICINE

## 2019-10-07 PROCEDURE — 80076 HEPATIC FUNCTION PANEL: CPT | Performed by: INTERNAL MEDICINE

## 2019-10-07 PROCEDURE — 85610 PROTHROMBIN TIME: CPT | Performed by: INTERNAL MEDICINE

## 2019-10-07 RX ORDER — SPIRONOLACTONE 100 MG/1
50 TABLET, FILM COATED ORAL DAILY
Refills: 0
Start: 2019-10-07 | End: 2019-11-01 | Stop reason: SINTOL

## 2019-10-07 RX ORDER — SPIRONOLACTONE 100 MG/1
100 TABLET, FILM COATED ORAL DAILY
Refills: 0 | COMMUNITY
Start: 2019-08-19 | End: 2019-10-07

## 2019-10-07 RX ORDER — FUROSEMIDE 20 MG
20 TABLET ORAL DAILY
Refills: 2 | COMMUNITY
Start: 2019-07-03 | End: 2020-01-09 | Stop reason: ALTCHOICE

## 2019-10-07 RX ORDER — ALBUMIN (HUMAN) 12.5 G/50ML
12.5 SOLUTION INTRAVENOUS 4 TIMES DAILY PRN
Status: CANCELLED
Start: 2019-10-07

## 2019-10-07 RX ORDER — ALBUMIN (HUMAN) 12.5 G/50ML
12.5 SOLUTION INTRAVENOUS 4 TIMES DAILY PRN
Status: DISCONTINUED | OUTPATIENT
Start: 2019-10-07 | End: 2019-10-07 | Stop reason: HOSPADM

## 2019-10-07 RX ADMIN — ALBUMIN HUMAN 12.5 G: 0.25 SOLUTION INTRAVENOUS at 13:09

## 2019-10-07 RX ADMIN — ALBUMIN HUMAN 12.5 G: 0.25 SOLUTION INTRAVENOUS at 13:03

## 2019-10-07 RX ADMIN — ALBUMIN HUMAN 12.5 G: 0.25 SOLUTION INTRAVENOUS at 13:16

## 2019-10-07 RX ADMIN — ALBUMIN HUMAN 12.5 G: 0.25 SOLUTION INTRAVENOUS at 13:26

## 2019-10-07 RX ADMIN — LIDOCAINE HYDROCHLORIDE 15 ML: 10 INJECTION, SOLUTION EPIDURAL; INFILTRATION; INTRACAUDAL; PERINEURAL at 13:02

## 2019-10-07 ASSESSMENT — MIFFLIN-ST. JEOR: SCORE: 1665.43

## 2019-10-07 ASSESSMENT — PAIN SCALES - GENERAL: PAINLEVEL: NO PAIN (0)

## 2019-10-07 NOTE — PROGRESS NOTES
"[facility]    Hepatology follow-up    Follow-up visit for cirrhosis    Subjective:  54 year old male    Cirrhosis  - ETOH  - hx ascites, TIPS placement 5/14/18, 6/6/18  - hx HE  - hx variceal bleed Oct 2017  - ETOH hepatitis March 2019  - last EGD Apr 2018- medium EV with bandingx4, mild portal hypertensive gastropathy  - HCC screening- liver TIPS doppler U/S June 2019    Patient comes to clinic this morning with his wife for follow-up of cirrhosis.  Last clinic visit July 2019.  Patient saw nephrology for RTA in August and will follow-up in the stone clinic.  He denies new medications, ER visits or hospital admissions since last clinic visit.    Patient is doing OK today.  He has developed occasional nausea and abdominal distension over the past month.  He also has mild lower extremity edema.  He is scheduled for a paracentesis this afternoon (last paracentesis May 2019).  His wife thinks that this change correlates with increased appetite and subsequent poor adherence to low Na diet.    Patient denies jaundice, lethargy or confusion.  His wife has put him in charge of his own medications and found out last week that he was only taking rifaximin once daily.    Patient denies melena, hematemesis or hematochezia.    Patient denies fevers, sweats or chills.  He declines an influenza vaccination.    Weight stable.    Patient last drank alcohol in Feb 2019.  He has not attended any chemical dependency appointments since his last clinic visit- he has been \"too busy.\"      Medical hx Surgical hx   Past Medical History:   Diagnosis Date     Alcoholic cirrhosis (H)      Alcoholic hepatitis 03/2019     Hypertension      Left calcaneus fracture 1/9/2006 January 16, 2006: Fell 10 feet from ladder onto left foot on frozen ground on 1/9/06 at home.  Immediate pain and unable to walk- seen at [place] and diagnosed with calcaneus fracture     Portal vein thrombosis     left occlusion, partial main "      Past Surgical History:   Procedure Laterality Date     ANKLE SURGERY Left      COLONOSCOPY N/A 3/31/2016    Procedure: COLONOSCOPY;  Surgeon: Rhys Uriostegui MD;  Location:  GI     ESOPHAGOSCOPY, GASTROSCOPY, DUODENOSCOPY (EGD), COMBINED N/A 3/31/2016    Procedure: COMBINED ESOPHAGOSCOPY, GASTROSCOPY, DUODENOSCOPY (EGD);  Surgeon: Rhys Uriostegui MD;  Location:  GI     ESOPHAGOSCOPY, GASTROSCOPY, DUODENOSCOPY (EGD), COMBINED N/A 3/9/2018    Procedure: COMBINED ESOPHAGOSCOPY, GASTROSCOPY, DUODENOSCOPY (EGD), BIOPSY SINGLE OR MULTIPLE;  EGD;  Surgeon: Gonzalo Wahl MD;  Location:  GI     ESOPHAGOSCOPY, GASTROSCOPY, DUODENOSCOPY (EGD), COMBINED N/A 6/7/2019    Procedure: ESOPHAGOGASTRODUODENOSCOPY (EGD);  Surgeon: Gonzalo Wahl MD;  Location:  GI     KNEE SURGERY Left      KNEE SURGERY Right      SIGMOIDOSCOPY FLEXIBLE N/A 10/31/2017    Procedure: SIGMOIDOSCOPY FLEXIBLE;;  Surgeon: Armaan Adams MD;  Location:  GI     TIPS Procedure  06/06/2018          Medications  Prior to Admission medications    Medication Sig Start Date End Date Taking? Authorizing Provider   CONSTULOSE 10 GM/15ML solution TAKE 30MLS BY MOUTH THREE TIMES DAILY AS NEEDED FOR CONSTIPATION ( TAKE AS NEEDED TO MAINTAIN 3 TO 4 BOWEL MOVEMENTS DAILY) 4/26/19  Yes Gonzalo Wahl MD   furosemide (LASIX) 20 MG tablet Take 20 mg by mouth daily as needed 7/3/19  Yes Reported, Patient   MULTIPLE VITAMINS PO Take 1 tablet by mouth daily   Yes Reported, Patient   potassium chloride ER (K-DUR/KLOR-CON M) 20 MEQ CR tablet Take 20 mEq by mouth daily 6/17/19  Yes Too Washington DO   sodium bicarbonate 650 MG tablet Take 1 tablet (650 mg) by mouth 2 times daily 8/12/19  Yes Ryanne Obrien, NP   spironolactone (ALDACTONE) 100 MG tablet Take 100 mg by mouth daily 8/19/19  Yes Reported, Patient   Thiamine HCl (B-1) 100 MG TABS TAKE ONE TABLET BY MOUTH ONCE DAILY 9/27/19  Yes Padmini,  "Too Vasquez DO   vitamin D3 (CHOLECALCIFEROL) 2000 units (50 mcg) tablet Take 1 tablet by mouth daily   Yes Unknown, Entered By History   XIFAXAN 550 MG TABS tablet TAKE ONE TABLET BY MOUTH TWICE A DAY 9/27/19  Yes Too Washington DO       Allergies  Allergies   Allergen Reactions     Prednisone Visual Disturbance     Trazodone Visual Disturbance     Benadryl [Diphenhydramine] Other (See Comments)     Delirium (visual and auditory hallucinations)     Oxycodone Other (See Comments)     Delirium and constipation       Review of systems  A 10-point review of systems was negative    Examination  BP (!) 147/81 (BP Location: Left arm, Patient Position: Sitting, Cuff Size: Adult Regular)   Pulse 84   Temp 98.1  F (36.7  C) (Oral)   Resp 18   Ht 1.778 m (5' 10\")   Wt 82.9 kg (182 lb 12.8 oz)   SpO2 100%   BMI 26.23 kg/m    Body mass index is 26.23 kg/m .    Gen- well, NAD, A+Ox3, normal color  CVS- RRR  RS- reduced air entry left base, no crackles or wheeze  Abd- distended, soft, non-tender, small reducible umbilical hernia, flank dullness on percussion, no obvious organomegaly on palpation or percussion, BS+  Extr- hands normal, mild bilateral pitting BESSY to ankles  Skin- no rash or jaundice  Neuro- no asterixis  Psych- normal mood    Laboratory  Lab Results   Component Value Date     10/07/2019    POTASSIUM 3.5 10/07/2019    CHLORIDE 115 10/07/2019    CO2 22 10/07/2019    BUN 9 10/07/2019    CR 1.32 10/07/2019       Lab Results   Component Value Date    BILITOTAL 4.4 10/07/2019    ALT 15 10/07/2019    AST 36 10/07/2019    ALKPHOS 140 10/07/2019       Lab Results   Component Value Date    ALBUMIN 2.3 10/07/2019    PROTTOTAL 5.4 10/07/2019        Lab Results   Component Value Date    WBC 3.4 10/07/2019    HGB 10.2 10/07/2019    MCV 93 10/07/2019    PLT 61 10/07/2019       Lab Results   Component Value Date    INR 2.18 10/07/2019       Radiology  Nil recent    Assessment  54 year old male who presents for " routine follow-up of decompensated alcoholic cirrhosis complicated with ascites and hepatic encephalopathy.  MELD-Na= 23 (stable).  Last ETOH= Feb 2019.  Worsening ascites since last clinic visit is likely related to poor adherence to low Na diet but will rule out TIPS dysfunction.  Will reduce diuretics doses due to renal dysfunction.  Hepatic encephalopathy adequately managed on current medications.  Patient will not be referred for liver transplant evaluation until he starts chemical dependency treatment at a bare minimum.  Up to date with HCC screening.      Plan  1.  Reduce spironolactone to 50mg PO Q24  2.  Increase oral fluid intake  3.  Check BMP next week  4.  Paracentesis as needed  5.  Liver TIPS doppler U/S  6.  Continue lactulose and rifaximin  7.  Follow-up in 3 months with PA regarding ascites    Gonzalo Bales MD  Hepatology  [facility]

## 2019-10-07 NOTE — LETTER
"10/7/2019       RE: Ivan Bell  [address] [address] [id number]     Dear Colleague,    Thank you for referring your patient, Ivan Bell, to the [facility] HEPATOLOGY at [facility]. Please see a copy of my visit note below.    [facility]    Hepatology follow-up    Follow-up visit for cirrhosis    Subjective:  54 year old male    Cirrhosis  - ETOH  - hx ascites, TIPS placement 5/14/18, 6/6/18  - hx HE  - hx variceal bleed Oct 2017  - ETOH hepatitis March 2019  - last EGD Apr 2018- medium EV with bandingx4, mild portal hypertensive gastropathy  - HCC screening- liver TIPS doppler U/S June 2019    Patient comes to clinic this morning with his wife for follow-up of cirrhosis.  Last clinic visit July 2019.  Patient saw nephrology for RTA in August and will follow-up in the stone clinic.  He denies new medications, ER visits or hospital admissions since last clinic visit.    Patient is doing OK today.  He has developed occasional nausea and abdominal distension over the past month.  He also has mild lower extremity edema.  He is scheduled for a paracentesis this afternoon (last paracentesis May 2019).  His wife thinks that this change correlates with increased appetite and subsequent poor adherence to low Na diet.    Patient denies jaundice, lethargy or confusion.  His wife has put him in charge of his own medications and found out last week that he was only taking rifaximin once daily.    Patient denies melena, hematemesis or hematochezia.    Patient denies fevers, sweats or chills.  He declines an influenza vaccination.    Weight stable.    Patient last drank alcohol in Feb 2019.  He has not attended any chemical dependency appointments since his last clinic visit- he has been \"too busy.\"      Medical hx Surgical hx   Past Medical History:   Diagnosis Date     Alcoholic cirrhosis (H)      Alcoholic hepatitis 03/2019     Hypertension      " Left calcaneus fracture 1/9/2006 January 16, 2006: Fell 10 feet from ladder onto left foot on frozen ground on 1/9/06 at home.  Immediate pain and unable to walk- seen at [place] and diagnosed with calcaneus fracture     Portal vein thrombosis     left occlusion, partial main      Past Surgical History:   Procedure Laterality Date     ANKLE SURGERY Left      COLONOSCOPY N/A 3/31/2016    Procedure: COLONOSCOPY;  Surgeon: Rhys Uriostegui MD;  Location:  GI     ESOPHAGOSCOPY, GASTROSCOPY, DUODENOSCOPY (EGD), COMBINED N/A 3/31/2016    Procedure: COMBINED ESOPHAGOSCOPY, GASTROSCOPY, DUODENOSCOPY (EGD);  Surgeon: Rhys Uriostegui MD;  Location:  GI     ESOPHAGOSCOPY, GASTROSCOPY, DUODENOSCOPY (EGD), COMBINED N/A 3/9/2018    Procedure: COMBINED ESOPHAGOSCOPY, GASTROSCOPY, DUODENOSCOPY (EGD), BIOPSY SINGLE OR MULTIPLE;  EGD;  Surgeon: Gonzalo Wahl MD;  Location:  GI     ESOPHAGOSCOPY, GASTROSCOPY, DUODENOSCOPY (EGD), COMBINED N/A 6/7/2019    Procedure: ESOPHAGOGASTRODUODENOSCOPY (EGD);  Surgeon: Gonzalo Wahl MD;  Location:  GI     KNEE SURGERY Left      KNEE SURGERY Right      SIGMOIDOSCOPY FLEXIBLE N/A 10/31/2017    Procedure: SIGMOIDOSCOPY FLEXIBLE;;  Surgeon: Armaan Adams MD;  Location:  GI     TIPS Procedure  06/06/2018          Medications  Prior to Admission medications    Medication Sig Start Date End Date Taking? Authorizing Provider   CONSTULOSE 10 GM/15ML solution TAKE 30MLS BY MOUTH THREE TIMES DAILY AS NEEDED FOR CONSTIPATION ( TAKE AS NEEDED TO MAINTAIN 3 TO 4 BOWEL MOVEMENTS DAILY) 4/26/19  Yes Gonzalo Wahl MD   furosemide (LASIX) 20 MG tablet Take 20 mg by mouth daily as needed 7/3/19  Yes Reported, Patient   MULTIPLE VITAMINS PO Take 1 tablet by mouth daily   Yes Reported, Patient   potassium chloride ER (K-DUR/KLOR-CON M) 20 MEQ CR tablet Take 20 mEq by mouth daily 6/17/19  Yes Too Washington,    sodium bicarbonate 650 MG  "tablet Take 1 tablet (650 mg) by mouth 2 times daily 8/12/19  Yes Ryanne Obrien, NP   spironolactone (ALDACTONE) 100 MG tablet Take 100 mg by mouth daily 8/19/19  Yes Reported, Patient   Thiamine HCl (B-1) 100 MG TABS TAKE ONE TABLET BY MOUTH ONCE DAILY 9/27/19  Yes Too Washington, DO   vitamin D3 (CHOLECALCIFEROL) 2000 units (50 mcg) tablet Take 1 tablet by mouth daily   Yes Unknown, Entered By History   XIFAXAN 550 MG TABS tablet TAKE ONE TABLET BY MOUTH TWICE A DAY 9/27/19  Yes Too Washington,        Allergies  Allergies   Allergen Reactions     Prednisone Visual Disturbance     Trazodone Visual Disturbance     Benadryl [Diphenhydramine] Other (See Comments)     Delirium (visual and auditory hallucinations)     Oxycodone Other (See Comments)     Delirium and constipation       Review of systems  A 10-point review of systems was negative    Examination  BP (!) 147/81 (BP Location: Left arm, Patient Position: Sitting, Cuff Size: Adult Regular)   Pulse 84   Temp 98.1  F (36.7  C) (Oral)   Resp 18   Ht 1.778 m (5' 10\")   Wt 82.9 kg (182 lb 12.8 oz)   SpO2 100%   BMI 26.23 kg/m     Body mass index is 26.23 kg/m .    Gen- well, NAD, A+Ox3, normal color  CVS- RRR  RS- reduced air entry left base, no crackles or wheeze  Abd- distended, soft, non-tender, small reducible umbilical hernia, flank dullness on percussion, no obvious organomegaly on palpation or percussion, BS+  Extr- hands normal, mild bilateral pitting BESSY to ankles  Skin- no rash or jaundice  Neuro- no asterixis  Psych- normal mood    Laboratory  Lab Results   Component Value Date     10/07/2019    POTASSIUM 3.5 10/07/2019    CHLORIDE 115 10/07/2019    CO2 22 10/07/2019    BUN 9 10/07/2019    CR 1.32 10/07/2019       Lab Results   Component Value Date    BILITOTAL 4.4 10/07/2019    ALT 15 10/07/2019    AST 36 10/07/2019    ALKPHOS 140 10/07/2019       Lab Results   Component Value Date    ALBUMIN 2.3 10/07/2019    PROTTOTAL 5.4 " 10/07/2019        Lab Results   Component Value Date    WBC 3.4 10/07/2019    HGB 10.2 10/07/2019    MCV 93 10/07/2019    PLT 61 10/07/2019       Lab Results   Component Value Date    INR 2.18 10/07/2019       Radiology  Nil recent    Assessment  54 year old male who presents for routine follow-up of decompensated alcoholic cirrhosis complicated with ascites and hepatic encephalopathy.  MELD-Na= 23 (stable).  Last ETOH= Feb 2019.  Worsening ascites since last clinic visit is likely related to poor adherence to low Na diet but will rule out TIPS dysfunction.  Will reduce diuretics doses due to renal dysfunction.  Hepatic encephalopathy adequately managed on current medications.  Patient will not be referred for liver transplant evaluation until he starts chemical dependency treatment at a bare minimum.  Up to date with HCC screening.      Plan  1.  Reduce spironolactone to 50mg PO Q24  2.  Increase oral fluid intake  3.  Check BMP next week  4.  Paracentesis as needed  5.  Liver TIPS doppler U/S  6.  Continue lactulose and rifaximin  7.  Follow-up in 3 months with PA regarding ascites    Gonzalo Bales MD  Hepatology  [facility]    Again, thank you for allowing me to participate in the care of your patient.      Sincerely,    Gonzalo Bales MD

## 2019-10-07 NOTE — PATIENT INSTRUCTIONS
Plan   1.  Paracentesis today  2.  Strict low salt diet  3.  Reduce spironolactone to 50mg per day (half a tablet per day)  4.  Recheck blood work in 1 week  5.  Continue lactulose  6.  Continue rifaximin 550mg twice per day  7.  Liver ultrasound  8.  Follow-up here in 3 months with Celestino Bales MD  Hepatology  [facility]

## 2019-10-07 NOTE — NURSING NOTE
"Chief Complaint   Patient presents with     RECHECK     ETOH Cirrhosis       Vital signs:  Temp: 98.1  F (36.7  C) Temp src: Oral BP: (!) 147/81 Pulse: 84   Resp: 18 SpO2: 100 %     Height: 177.8 cm (5' 10\") Weight: 82.9 kg (182 lb 12.8 oz)  Estimated body mass index is 26.23 kg/m  as calculated from the following:    Height as of this encounter: 1.778 m (5' 10\").    Weight as of this encounter: 82.9 kg (182 lb 12.8 oz).        Lida Hutchinson, [facility]  10/7/2019 8:03 AM      "

## 2019-10-07 NOTE — LETTER
"10/7/2019      RE: Ivan Bell  [address] [address] [facility] [address]       [facility]    Hepatology follow-up    Follow-up visit for cirrhosis    Subjective:  54 year old male    Cirrhosis  - ETOH  - hx ascites, TIPS placement 5/14/18, 6/6/18  - hx HE  - hx variceal bleed Oct 2017  - ETOH hepatitis March 2019  - last EGD Apr 2018- medium EV with bandingx4, mild portal hypertensive gastropathy  - HCC screening- liver TIPS doppler U/S June 2019    Patient comes to clinic this morning with his wife for follow-up of cirrhosis.  Last clinic visit July 2019.  Patient saw nephrology for RTA in August and will follow-up in the stone clinic.  He denies new medications, ER visits or hospital admissions since last clinic visit.    Patient is doing OK today.  He has developed occasional nausea and abdominal distension over the past month.  He also has mild lower extremity edema.  He is scheduled for a paracentesis this afternoon (last paracentesis May 2019).  His wife thinks that this change correlates with increased appetite and subsequent poor adherence to low Na diet.    Patient denies jaundice, lethargy or confusion.  His wife has put him in charge of his own medications and found out last week that he was only taking rifaximin once daily.    Patient denies melena, hematemesis or hematochezia.    Patient denies fevers, sweats or chills.  He declines an influenza vaccination.    Weight stable.    Patient last drank alcohol in Feb 2019.  He has not attended any chemical dependency appointments since his last clinic visit- he has been \"too busy.\"      Medical hx Surgical hx   Past Medical History:   Diagnosis Date     Alcoholic cirrhosis (H)      Alcoholic hepatitis 03/2019     Hypertension      Left calcaneus fracture 1/9/2006 January 16, 2006: Fell 10 feet from ladder onto left foot on frozen ground on 1/9/06 at home.  Immediate pain and unable to walk- seen at [place] and diagnosed " with calcaneus fracture     Portal vein thrombosis     left occlusion, partial main      Past Surgical History:   Procedure Laterality Date     ANKLE SURGERY Left      COLONOSCOPY N/A 3/31/2016    Procedure: COLONOSCOPY;  Surgeon: Rhys Uriostegui MD;  Location:  GI     ESOPHAGOSCOPY, GASTROSCOPY, DUODENOSCOPY (EGD), COMBINED N/A 3/31/2016    Procedure: COMBINED ESOPHAGOSCOPY, GASTROSCOPY, DUODENOSCOPY (EGD);  Surgeon: Rhys Uriostegui MD;  Location:  GI     ESOPHAGOSCOPY, GASTROSCOPY, DUODENOSCOPY (EGD), COMBINED N/A 3/9/2018    Procedure: COMBINED ESOPHAGOSCOPY, GASTROSCOPY, DUODENOSCOPY (EGD), BIOPSY SINGLE OR MULTIPLE;  EGD;  Surgeon: Gonzalo Wahl MD;  Location:  GI     ESOPHAGOSCOPY, GASTROSCOPY, DUODENOSCOPY (EGD), COMBINED N/A 6/7/2019    Procedure: ESOPHAGOGASTRODUODENOSCOPY (EGD);  Surgeon: Gonzalo Wahl MD;  Location:  GI     KNEE SURGERY Left      KNEE SURGERY Right      SIGMOIDOSCOPY FLEXIBLE N/A 10/31/2017    Procedure: SIGMOIDOSCOPY FLEXIBLE;;  Surgeon: Armaan Adams MD;  Location:  GI     TIPS Procedure  06/06/2018          Medications  Prior to Admission medications    Medication Sig Start Date End Date Taking? Authorizing Provider   CONSTULOSE 10 GM/15ML solution TAKE 30MLS BY MOUTH THREE TIMES DAILY AS NEEDED FOR CONSTIPATION ( TAKE AS NEEDED TO MAINTAIN 3 TO 4 BOWEL MOVEMENTS DAILY) 4/26/19  Yes Gonzalo Wahl MD   furosemide (LASIX) 20 MG tablet Take 20 mg by mouth daily as needed 7/3/19  Yes Reported, Patient   MULTIPLE VITAMINS PO Take 1 tablet by mouth daily   Yes Reported, Patient   potassium chloride ER (K-DUR/KLOR-CON M) 20 MEQ CR tablet Take 20 mEq by mouth daily 6/17/19  Yes Too Washington DO   sodium bicarbonate 650 MG tablet Take 1 tablet (650 mg) by mouth 2 times daily 8/12/19  Yes Ryanne Obrien NP   spironolactone (ALDACTONE) 100 MG tablet Take 100 mg by mouth daily 8/19/19  Yes Reported, Patient  "  Thiamine HCl (B-1) 100 MG TABS TAKE ONE TABLET BY MOUTH ONCE DAILY 9/27/19  Yes Too Washington DO   vitamin D3 (CHOLECALCIFEROL) 2000 units (50 mcg) tablet Take 1 tablet by mouth daily   Yes Unknown, Entered By History   XIFAXAN 550 MG TABS tablet TAKE ONE TABLET BY MOUTH TWICE A DAY 9/27/19  Yes Too Washington DO       Allergies  Allergies   Allergen Reactions     Prednisone Visual Disturbance     Trazodone Visual Disturbance     Benadryl [Diphenhydramine] Other (See Comments)     Delirium (visual and auditory hallucinations)     Oxycodone Other (See Comments)     Delirium and constipation       Review of systems  A 10-point review of systems was negative    Examination  BP (!) 147/81 (BP Location: Left arm, Patient Position: Sitting, Cuff Size: Adult Regular)   Pulse 84   Temp 98.1  F (36.7  C) (Oral)   Resp 18   Ht 1.778 m (5' 10\")   Wt 82.9 kg (182 lb 12.8 oz)   SpO2 100%   BMI 26.23 kg/m     Body mass index is 26.23 kg/m .    Gen- well, NAD, A+Ox3, normal color  CVS- RRR  RS- reduced air entry left base, no crackles or wheeze  Abd- distended, soft, non-tender, small reducible umbilical hernia, flank dullness on percussion, no obvious organomegaly on palpation or percussion, BS+  Extr- hands normal, mild bilateral pitting BESSY to ankles  Skin- no rash or jaundice  Neuro- no asterixis  Psych- normal mood    Laboratory  Lab Results   Component Value Date     10/07/2019    POTASSIUM 3.5 10/07/2019    CHLORIDE 115 10/07/2019    CO2 22 10/07/2019    BUN 9 10/07/2019    CR 1.32 10/07/2019       Lab Results   Component Value Date    BILITOTAL 4.4 10/07/2019    ALT 15 10/07/2019    AST 36 10/07/2019    ALKPHOS 140 10/07/2019       Lab Results   Component Value Date    ALBUMIN 2.3 10/07/2019    PROTTOTAL 5.4 10/07/2019        Lab Results   Component Value Date    WBC 3.4 10/07/2019    HGB 10.2 10/07/2019    MCV 93 10/07/2019    PLT 61 10/07/2019       Lab Results   Component Value Date    INR 2.18 " 10/07/2019       Radiology  Nil recent    Assessment  54 year old male who presents for routine follow-up of decompensated alcoholic cirrhosis complicated with ascites and hepatic encephalopathy.  MELD-Na= 23 (stable).  Last ETOH= Feb 2019.  Worsening ascites since last clinic visit is likely related to poor adherence to low Na diet but will rule out TIPS dysfunction.  Will reduce diuretics doses due to renal dysfunction.  Hepatic encephalopathy adequately managed on current medications.  Patient will not be referred for liver transplant evaluation until he starts chemical dependency treatment at a bare minimum.  Up to date with HCC screening.      Plan  1.  Reduce spironolactone to 50mg PO Q24  2.  Increase oral fluid intake  3.  Check BMP next week  4.  Paracentesis as needed  5.  Liver TIPS doppler U/S  6.  Continue lactulose and rifaximin  7.  Follow-up in 3 months with PA regarding ascites    Gonzalo Bales MD  Hepatology  [facility]    Gonzalo Bales MD

## 2019-10-07 NOTE — PATIENT INSTRUCTIONS
Patient Education     Discharge Instructions for Paracentesis  Paracentesis is a procedure to remove extra fluid from your belly (abdomen). This fluid buildup in the abdomen is called ascites. The procedure may have been done to take a sample of the fluid. Or, it may have been done to drain the extra fluid from your abdomen and help make you more comfortable.     Ascites is buildup of excess fluid in the abdomen.   Home care    If you have pain after the procedure, your healthcare provider can prescribe or recommend pain medicines. Take these exactly as directed. If you stopped taking other medicines before the procedure, ask your provider when you can start them again.    Take it easy for 24 hours after the procedure. Avoid physical activity until your provider says it s OK.    You will have a small bandage over the puncture site. Stitches (sutures), surgical staples, adhesive tapes, adhesive strips, or surgical glue may be used to close the incision. They also help stop bleeding and speed healing. You may take the bandage off in 24 hours.    Check the puncture site for the signs of infection listed below.  Follow-up care  Make a follow-up appointment with your healthcare provider as directed. During your follow-up visit, your provider will check your healing. Let your provider know how you are feeling. You can also discuss the cause of your ascites and if you need any further treatment.  When to seek medical advice  Call your healthcare provider if you have any of the following after the procedure:    A fever of 100.4 F (38 C) or higher    Trouble breathing    Pain that doesn't go away even after taking pain medicine    Belly pain not caused by having the skin punctured    Bleeding from the puncture site    More than a small amount of fluid leaking from the puncture site    Swollen belly    Signs of infection at the puncture site. These include increased pain, redness, or swelling, warmth, or bad-smelling  drainage.    Blood in your urine    Feeling dizzy or lightheaded, or fainting   Date Last Reviewed: 7/1/2016 2000-2018 [employer]. [address] [place] [address], [place] [address]. All rights reserved. This information is not intended as a substitute for professional medical care. Always follow your healthcare professional's instructions.

## 2019-10-07 NOTE — PROGRESS NOTES
Paracentesis Nursing Note  Ivan Bell presents today to [facility] for a paracentesis.    During today's appointment orders from Celestino Verduzco PA-C were completed.    Progress Note:  Patient identification verified by name and date of birth.  Assessment completed.  Vitals monitored throughout appointment and recorded in Doc Flowsheets.  See proceduralist note in ultrasound.    Vascular Access: peripheral IV placed today.  Labs: were not ordered for this appointment.    Date of consent or authorization: 10/7/19.  Invasive Procedure Safety Checklist was completed and sent for scanning.     Paracentesis performed by Courtney Dowell PA-C Radiology.    The following labs were communicated to provider performing paracentesis:  Lab Results   Component Value Date    PLT 61 10/07/2019       Total amount of ascites fluid drained: 4.2 liters.  Color of ascites fluid: yellow.  Total amount of albumin given: 50  grams.    Patient tolerated procedure well.    Post procedure,denies pain or discomfort post paracentesis.      Discharge Plan:  Discharge instructions were reviewed with patient.  Patient/Representative verbalized understanding and all questions were answered.   Discharged from [facility] in stable condition.    Sepideh Le RN       Administrations This Visit     albumin human 25 % injection 12.5 g     Admin Date  10/07/2019 Action  New Bag Dose  12.5 g Route  Intravenous Administered By  Sepideh Le RN           Admin Date  10/07/2019 Action  New Bag Dose  12.5 g Route  Intravenous Administered By  Sepideh Le, DILIA           Admin Date  10/07/2019 Action  New Bag Dose  12.5 g Route  Intravenous Administered By  Sepideh Le, DILIA           Admin Date  10/07/2019 Action  New Bag Dose  12.5 g Route  Intravenous Administered By  Sepideh eL, DILIA          lidocaine 1 % 20 mL     Admin Date  10/07/2019 Action  Given by Other Dose  15 mL Route  Other  Administered By  Sepideh Le, RN                  Wt 82.6 kg (182 lb)   SpO2 100%   BMI 26.11 kg/m

## 2019-10-08 NOTE — NURSING NOTE
Met with patient and wife along with Dr. Bales. Patient experiencing some worsening ascites but may be related to increased appetite and poor food choices. Patient had paracentesis with 4200 ml removed. Per Dr. Bales patient to have doppler ultrasound to evaluate TIPS, decrease spironolactone to 50 mg daily due to elevated creatinine and repeat BMP in 1-2 weeks, increase fluid intake and decrease sodium intake, and follow up in 3 months with Celestino Verduzco PA-C.

## 2019-10-12 ENCOUNTER — HOSPITAL ENCOUNTER (EMERGENCY)
Facility: CLINIC | Age: 54
Discharge: HOME | End: 2019-10-12
Attending: EMERGENCY MEDICINE | Admitting: EMERGENCY MEDICINE
Payer: COMMERCIAL

## 2019-10-12 ENCOUNTER — ANCILLARY PROCEDURE (OUTPATIENT)
Dept: ULTRASOUND IMAGING | Facility: CLINIC | Age: 54
End: 2019-10-12
Attending: INTERNAL MEDICINE
Payer: COMMERCIAL

## 2019-10-12 VITALS
SYSTOLIC BLOOD PRESSURE: 158 MMHG | OXYGEN SATURATION: 97 % | BODY MASS INDEX: 25.11 KG/M2 | RESPIRATION RATE: 16 BRPM | HEART RATE: 89 BPM | WEIGHT: 175.4 LBS | DIASTOLIC BLOOD PRESSURE: 62 MMHG | TEMPERATURE: 97.9 F | HEIGHT: 70 IN

## 2019-10-12 DIAGNOSIS — K83.8 BILIARY SLUDGE DETERMINED BY ULTRASOUND: ICD-10-CM

## 2019-10-12 DIAGNOSIS — K70.31 ALCOHOLIC CIRRHOSIS OF LIVER WITH ASCITES (H): ICD-10-CM

## 2019-10-12 LAB
ALBUMIN SERPL-MCNC: 2.6 G/DL (ref 3.4–5)
ALP SERPL-CCNC: 126 U/L (ref 40–150)
ALT SERPL W P-5'-P-CCNC: 14 U/L (ref 0–70)
ANION GAP SERPL CALCULATED.3IONS-SCNC: 7 MMOL/L (ref 3–14)
AST SERPL W P-5'-P-CCNC: 27 U/L (ref 0–45)
BASOPHILS # BLD AUTO: 0 10E9/L (ref 0–0.2)
BASOPHILS NFR BLD AUTO: 0.6 %
BILIRUB SERPL-MCNC: 4.7 MG/DL (ref 0.2–1.3)
BUN SERPL-MCNC: 8 MG/DL (ref 7–30)
CALCIUM SERPL-MCNC: 9 MG/DL (ref 8.5–10.1)
CHLORIDE SERPL-SCNC: 114 MMOL/L (ref 94–109)
CO2 BLDCOV-SCNC: 20 MMOL/L (ref 21–28)
CO2 SERPL-SCNC: 21 MMOL/L (ref 20–32)
CREAT SERPL-MCNC: 1.25 MG/DL (ref 0.66–1.25)
DIFFERENTIAL METHOD BLD: ABNORMAL
EOSINOPHIL # BLD AUTO: 0.3 10E9/L (ref 0–0.7)
EOSINOPHIL NFR BLD AUTO: 7.7 %
ERYTHROCYTE [DISTWIDTH] IN BLOOD BY AUTOMATED COUNT: 17.3 % (ref 10–15)
GFR SERPL CREATININE-BSD FRML MDRD: 64 ML/MIN/{1.73_M2}
GLUCOSE SERPL-MCNC: 132 MG/DL (ref 70–99)
HCT VFR BLD AUTO: 30.7 % (ref 40–53)
HGB BLD-MCNC: 10 G/DL (ref 13.3–17.7)
IMM GRANULOCYTES # BLD: 0 10E9/L (ref 0–0.4)
IMM GRANULOCYTES NFR BLD: 0.3 %
LACTATE BLD-SCNC: 2.4 MMOL/L (ref 0.7–2.1)
LYMPHOCYTES # BLD AUTO: 0.7 10E9/L (ref 0.8–5.3)
LYMPHOCYTES NFR BLD AUTO: 20.2 %
MCH RBC QN AUTO: 30.1 PG (ref 26.5–33)
MCHC RBC AUTO-ENTMCNC: 32.6 G/DL (ref 31.5–36.5)
MCV RBC AUTO: 93 FL (ref 78–100)
MONOCYTES # BLD AUTO: 0.3 10E9/L (ref 0–1.3)
MONOCYTES NFR BLD AUTO: 9.2 %
NEUTROPHILS # BLD AUTO: 2.1 10E9/L (ref 1.6–8.3)
NEUTROPHILS NFR BLD AUTO: 62 %
NRBC # BLD AUTO: 0 10*3/UL
NRBC BLD AUTO-RTO: 0 /100
PCO2 BLDV: 34 MM HG (ref 40–50)
PH BLDV: 7.37 PH (ref 7.32–7.43)
PLATELET # BLD AUTO: 50 10E9/L (ref 150–450)
PO2 BLDV: 32 MM HG (ref 25–47)
POTASSIUM SERPL-SCNC: 3 MMOL/L (ref 3.4–5.3)
PROT SERPL-MCNC: 5.3 G/DL (ref 6.8–8.8)
RBC # BLD AUTO: 3.32 10E12/L (ref 4.4–5.9)
SAO2 % BLDV FROM PO2: 61 %
SODIUM SERPL-SCNC: 143 MMOL/L (ref 133–144)
WBC # BLD AUTO: 3.4 10E9/L (ref 4–11)

## 2019-10-12 PROCEDURE — 99284 EMERGENCY DEPT VISIT MOD MDM: CPT | Mod: Z6 | Performed by: EMERGENCY MEDICINE

## 2019-10-12 PROCEDURE — 96361 HYDRATE IV INFUSION ADD-ON: CPT | Performed by: EMERGENCY MEDICINE

## 2019-10-12 PROCEDURE — 83605 ASSAY OF LACTIC ACID: CPT

## 2019-10-12 PROCEDURE — 80053 COMPREHEN METABOLIC PANEL: CPT | Performed by: EMERGENCY MEDICINE

## 2019-10-12 PROCEDURE — 82803 BLOOD GASES ANY COMBINATION: CPT

## 2019-10-12 PROCEDURE — 85025 COMPLETE CBC W/AUTO DIFF WBC: CPT | Performed by: EMERGENCY MEDICINE

## 2019-10-12 PROCEDURE — 96360 HYDRATION IV INFUSION INIT: CPT | Performed by: EMERGENCY MEDICINE

## 2019-10-12 PROCEDURE — 99283 EMERGENCY DEPT VISIT LOW MDM: CPT | Mod: 25 | Performed by: EMERGENCY MEDICINE

## 2019-10-12 PROCEDURE — 25800030 ZZH RX IP 258 OP 636: Performed by: EMERGENCY MEDICINE

## 2019-10-12 RX ORDER — SODIUM CHLORIDE, SODIUM LACTATE, POTASSIUM CHLORIDE, CALCIUM CHLORIDE 600; 310; 30; 20 MG/100ML; MG/100ML; MG/100ML; MG/100ML
1000 INJECTION, SOLUTION INTRAVENOUS CONTINUOUS
Status: DISCONTINUED | OUTPATIENT
Start: 2019-10-12 | End: 2019-10-12 | Stop reason: HOSPADM

## 2019-10-12 RX ADMIN — SODIUM CHLORIDE, POTASSIUM CHLORIDE, SODIUM LACTATE AND CALCIUM CHLORIDE 1000 ML: 600; 310; 30; 20 INJECTION, SOLUTION INTRAVENOUS at 13:24

## 2019-10-12 RX ADMIN — SODIUM CHLORIDE, POTASSIUM CHLORIDE, SODIUM LACTATE AND CALCIUM CHLORIDE 500 ML: 600; 310; 30; 20 INJECTION, SOLUTION INTRAVENOUS at 12:44

## 2019-10-12 ASSESSMENT — ENCOUNTER SYMPTOMS
CHILLS: 0
FEVER: 0
VOMITING: 0
ABDOMINAL PAIN: 1
ABDOMINAL DISTENTION: 0
CONSTIPATION: 0
SHORTNESS OF BREATH: 0
COUGH: 0
DIARRHEA: 0
NAUSEA: 0

## 2019-10-12 ASSESSMENT — MIFFLIN-ST. JEOR: SCORE: 1631.86

## 2019-10-12 NOTE — CONSULTS
General Surgery Consultation Note     Ivan Bell MRN# [id number]   Age: 54 year old YOB: 1965     Date of Admission:  10/12/2019    Date of Consult:   10/12/19    Reason for consult: Gangrenous gall bladder found on US        Requesting service: ED                   Assessment and Plan:   Assessment:   53 yo male with alcoholic liver cirrhosis, Meld of 23,  reported finding of cholecystitis vs gangrenous gall bladder on US, clinically the patient has no symptoms or signs of gall bladder disease         Plan:   - Patient has no signs or symptoms that correlate with cholecystitis, no indication for surgical intervention  - dispo per ED     Discussed with staff, Dr. Padmini Akbar MD  General surgery resident             Chief Complaint:   Gall bladder wall sloughing seen on US          History of Present Illness:   53 yo male with history of alcoholic liver cirrhosis, Meld 23, portal HTN and esophageal varices,  underwent US to evaluate TIPS patency this morning as he has been requiring paracentesis, he was called back and advised to present to the ED for suspicious finding of gangrenous gall bladder on imaging, normal CBD. The patient denies history of biliary colic or cholecystitis, denies fever, chills or RUQ pain, reports occasional LUQ pain that he is unsure if it is caused by food or soda, he does have gastric petechia noted on his last EGD, no nausea or emesis, tolerating diet, having regular BM, denies blood per rectum. WBC 3.4 chronically leukopenic.               Past Medical History:     Past Medical History:   Diagnosis Date     Alcoholic cirrhosis (H)      Alcoholic hepatitis 03/2019     Hypertension      Left calcaneus fracture 1/9/2006 January 16, 2006: Fell 10 feet from ladder onto left foot on frozen ground on 1/9/06 at home.  Immediate pain and unable to walk- seen at [place] and diagnosed with calcaneus fracture     Portal vein thrombosis     left occlusion, partial  main             Past Surgical History:     Past Surgical History:   Procedure Laterality Date     ANKLE SURGERY Left      COLONOSCOPY N/A 3/31/2016    Procedure: COLONOSCOPY;  Surgeon: Rhys Uriostegui MD;  Location:  GI     ESOPHAGOSCOPY, GASTROSCOPY, DUODENOSCOPY (EGD), COMBINED N/A 3/31/2016    Procedure: COMBINED ESOPHAGOSCOPY, GASTROSCOPY, DUODENOSCOPY (EGD);  Surgeon: Rhys Uriostegui MD;  Location:  GI     ESOPHAGOSCOPY, GASTROSCOPY, DUODENOSCOPY (EGD), COMBINED N/A 3/9/2018    Procedure: COMBINED ESOPHAGOSCOPY, GASTROSCOPY, DUODENOSCOPY (EGD), BIOPSY SINGLE OR MULTIPLE;  EGD;  Surgeon: Gonzalo Wahl MD;  Location:  GI     ESOPHAGOSCOPY, GASTROSCOPY, DUODENOSCOPY (EGD), COMBINED N/A 2019    Procedure: ESOPHAGOGASTRODUODENOSCOPY (EGD);  Surgeon: Gonzalo Wahl MD;  Location:  GI     KNEE SURGERY Left      KNEE SURGERY Right      SIGMOIDOSCOPY FLEXIBLE N/A 10/31/2017    Procedure: SIGMOIDOSCOPY FLEXIBLE;;  Surgeon: Armaan Adams MD;  Location:  GI     TIPS Procedure  2018             Social History:     Social History     Tobacco Use     Smoking status: Former Smoker     Types: Dip, chew, snus or snuff     Last attempt to quit: 1998     Years since quittin.1     Smokeless tobacco: Current User     Last attempt to quit: 10/24/2017     Tobacco comment: 1 tin per 10 days.   Substance Use Topics     Alcohol use: No     Alcohol/week: 17.5 standard drinks     Types: 21 Cans of beer per week     Comment: last etoh 16, did have Odouls ~2017             Family History:     Family History   Problem Relation Age of Onset     Family History Negative Mother      Family History Negative Father      Hypertension Father      Cerebrovascular Disease Father 87     Breast Cancer Maternal Grandmother      Rheumatoid Arthritis Daughter      Depression Daughter      Cancer - colorectal No family hx of      Prostate Cancer No family hx of      Liver  Disease No family hx of                 Allergies:     Allergies   Allergen Reactions     Prednisone Visual Disturbance     Trazodone Visual Disturbance     Benadryl [Diphenhydramine] Other (See Comments)     Delirium (visual and auditory hallucinations)     Oxycodone Other (See Comments)     Delirium and constipation             Medications:     No current facility-administered medications for this encounter.      Current Outpatient Medications   Medication Sig     CONSTULOSE 10 GM/15ML solution TAKE 30MLS BY MOUTH THREE TIMES DAILY AS NEEDED FOR CONSTIPATION ( TAKE AS NEEDED TO MAINTAIN 3 TO 4 BOWEL MOVEMENTS DAILY)     furosemide (LASIX) 20 MG tablet Take 20 mg by mouth daily as needed     MULTIPLE VITAMINS PO Take 1 tablet by mouth daily     potassium chloride ER (K-DUR/KLOR-CON M) 20 MEQ CR tablet Take 20 mEq by mouth daily     sodium bicarbonate 650 MG tablet Take 1 tablet (650 mg) by mouth 2 times daily     spironolactone (ALDACTONE) 100 MG tablet Take 0.5 tablets (50 mg) by mouth daily     Thiamine HCl (B-1) 100 MG TABS TAKE ONE TABLET BY MOUTH ONCE DAILY     vitamin D3 (CHOLECALCIFEROL) 2000 units (50 mcg) tablet Take 1 tablet by mouth daily     XIFAXAN 550 MG TABS tablet TAKE ONE TABLET BY MOUTH TWICE A DAY               Review of Systems:   CV: NEGATIVE for chest pain, palpitations or peripheral edema  C: NEGATIVE for fever, chills, change in weight  E/M: NEGATIVE for ear, mouth and throat problems  R: NEGATIVE for significant cough or SOB          Physical Exam:   All vitals have been reviewed  Temp:  [97.9  F (36.6  C)] 97.9  F (36.6  C)  Pulse:  [89] 89  Resp:  [16] 16  BP: (165)/(64) 165/64  SpO2:  [100 %] 100 %  No intake or output data in the 24 hours ending 10/12/19 1214  Physical Exam:  Alert and oriented, jaundiced   Non labored breathing, on RA   Non cyanotic   Soft abdomen, distended, reducible umbilical hernia, non tender, no RUQ tenderness           Data:   All laboratory data  reviewed    Results:  BMP  Recent Labs   Lab 10/07/19  0740      POTASSIUM 3.5   CHLORIDE 115*   CO2 22   BUN 9   CR 1.32*   *     CBC  Recent Labs   Lab 10/07/19  0740   WBC 3.4*   HGB 10.2*   PLT 61*     LFT  Recent Labs   Lab 10/07/19  0740   AST 36   ALT 15   ALKPHOS 140   BILITOTAL 4.4*   ALBUMIN 2.3*   INR 2.18*     Recent Labs   Lab 10/07/19  0740   *       Imaging:  US abd:                                                                  Impression:   1. Patent TIPS with increased velocities compared to prior exam.  Continued retrograde flow in the right portal vein.  2. Gallbladder is filled with hyperechoic material, with appearance of  sloughing of the gallbladder wall. There is no significant  pericholecystic fluid. This may be seen in acute cholecystitis or  gangrenous cholecystitis.  3. Stable mild right hydronephrosis.  4. Redemonstration of cirrhotic hepatic morphology. Moderate ascites.     Patient was called directly by Dr. Padmini Herrera at 7:25 AM, patient  did not answer a message was left for the patient regarding the need  to review his ultrasound results. Patient was called again by  radiology resident at 955 at which time patient answered, results were  communicated to patient with the recommendation of presenting to the  emergency department for further evaluation. Patient agreed to return  to the emergency department.     I have personally reviewed the examination and initial interpretation  and I agree with the findings.     MD Jessica BURROUGHS MD

## 2019-10-12 NOTE — ED AVS SNAPSHOT
[facility], [place], Emergency Department  [address] [place] [address]  Phone:  [phone]                                    Ivan Bell   MRN: [id number]    Department:  [facility], Emergency Department   Date of Visit:  10/12/2019           After Visit Summary Signature Page    I have received my discharge instructions, and my questions have been answered. I have discussed any challenges I see with this plan with the nurse or doctor.    ..........................................................................................................................................  Patient/Patient Representative Signature      ..........................................................................................................................................  Patient Representative Print Name and Relationship to Patient    ..................................................               ................................................  Date                                   Time    ..........................................................................................................................................  Reviewed by Signature/Title    ...................................................              ..............................................  Date                                               Time          22EPIC Rev 08/18

## 2019-10-12 NOTE — ED TRIAGE NOTES
Patient presents ambulatory from home post abdominal ultrasound. Patient reports he has alcoholic liver cirrhosis and had a liver stent placed approximately 1 year ago. Patient gets paracentesis performed as needed and had a scheduled ultrasound this morning. After the ultrasound was done and he got home he got a call from his doctor telling him to come to the ED.

## 2019-10-12 NOTE — ED PROVIDER NOTES
[place] EMERGENCY DEPARTMENT ([facility])  10/12/19   History     Chief Complaint   Patient presents with     Results     The history is provided by the patient and medical records.     Ivan Bell is a 54 year old male who has a PMHx of decompensated alcoholic cirrhosis (MELD-NA=23, stable per 10/7/19 clinic note) complicated with ascites and hepatic encephalopathy s/p TIPS, alcohol use disorder prior hospitalizations due to hepatic encephalopathy, who presents to the Emergency Department per physician call after an abdominal ultrasound today.  Per patient he had developed worsening ascites last week and met with his hepatologist who thought this may be related to poor adherence to low any diet but wanted to rule out TIPS dysfunction.  Provider reduced diuretic doses (spironolactone to 50 mg p.o. every 24 hr from 100 mg) and had patient undergo paracentesis (4.2 L on 10/7/19).  Patient had complete abdomen ultrasound with TIPS Doppler this morning and as soon as he arrived home received report that there was concern about sloughing of his gallbladder wall and recommended the patient presents the ED for further evaluation.    Here in the ED, patient denies any right-sided abdominal pain but has had intermittent left upper abdominal pain that often coincides with meals.  He denies fever, cough, vomiting or diarrhea.  He has not felt distended since this paracentesis nor is he had any difficulty with bowel movements.  He did have some swelling of his bilateral feet yesterday which has now improved.  Patient states that his ascites were related to him not taking his diuretic as prescribed.  He is still taking his lactulose and Xifaxan.  Patient states that outside of his TIPS procedure he has not had other abdominal surgeries.  States that he has had liver disease for approximately 3 years and was told he had a stage where he currently needs transplant given his cessation of alcohol consumption and his  prior TIPS.  Patient states that he quit drinking for 18 months before relapsing briefly (February 2019) and has since quit again.  Patient that he had his labs drawn Monday and usually has them done at [place]. Of note, patient is accompanied by his wife.      I have reviewed the Medications, Allergies, Past Medical and Surgical History, and Social History in the [facility] system.    Past Medical History:   Diagnosis Date     Alcoholic cirrhosis (H)      Alcoholic hepatitis 03/2019     Hypertension      Left calcaneus fracture 1/9/2006 January 16, 2006: Fell 10 feet from ladder onto left foot on frozen ground on 1/9/06 at home.  Immediate pain and unable to walk- seen at [place] and diagnosed with calcaneus fracture     Portal vein thrombosis     left occlusion, partial main       Past Surgical History:   Procedure Laterality Date     ANKLE SURGERY Left      COLONOSCOPY N/A 3/31/2016    Procedure: COLONOSCOPY;  Surgeon: Rhys Uriostegui MD;  Location:  GI     ESOPHAGOSCOPY, GASTROSCOPY, DUODENOSCOPY (EGD), COMBINED N/A 3/31/2016    Procedure: COMBINED ESOPHAGOSCOPY, GASTROSCOPY, DUODENOSCOPY (EGD);  Surgeon: Rhys Uriostegui MD;  Location:  GI     ESOPHAGOSCOPY, GASTROSCOPY, DUODENOSCOPY (EGD), COMBINED N/A 3/9/2018    Procedure: COMBINED ESOPHAGOSCOPY, GASTROSCOPY, DUODENOSCOPY (EGD), BIOPSY SINGLE OR MULTIPLE;  EGD;  Surgeon: Gonzalo Wahl MD;  Location:  GI     ESOPHAGOSCOPY, GASTROSCOPY, DUODENOSCOPY (EGD), COMBINED N/A 6/7/2019    Procedure: ESOPHAGOGASTRODUODENOSCOPY (EGD);  Surgeon: Gonzalo Wahl MD;  Location:  GI     KNEE SURGERY Left      KNEE SURGERY Right      SIGMOIDOSCOPY FLEXIBLE N/A 10/31/2017    Procedure: SIGMOIDOSCOPY FLEXIBLE;;  Surgeon: Armaan Adams MD;  Location:  GI     TIPS Procedure  06/06/2018       Family History   Problem Relation Age of Onset     Family History Negative Mother      Family History Negative Father       Hypertension Father      Cerebrovascular Disease Father 87     Breast Cancer Maternal Grandmother      Rheumatoid Arthritis Daughter      Depression Daughter      Cancer - colorectal No family hx of      Prostate Cancer No family hx of      Liver Disease No family hx of        Social History     Tobacco Use     Smoking status: Former Smoker     Types: Dip, chew, snus or snuff     Last attempt to quit: 1998     Years since quittin.1     Smokeless tobacco: Current User     Last attempt to quit: 10/24/2017     Tobacco comment: 1 tin per 10 days.   Substance Use Topics     Alcohol use: No     Alcohol/week: 17.5 standard drinks     Types: 21 Cans of beer per week     Comment: last etoh 16, did have Odouls ~2017       Current Facility-Administered Medications   Medication     lactated ringers infusion     Current Outpatient Medications   Medication     CONSTULOSE 10 GM/15ML solution     furosemide (LASIX) 20 MG tablet     MULTIPLE VITAMINS PO     potassium chloride ER (K-DUR/KLOR-CON M) 20 MEQ CR tablet     sodium bicarbonate 650 MG tablet     spironolactone (ALDACTONE) 100 MG tablet     Thiamine HCl (B-1) 100 MG TABS     vitamin D3 (CHOLECALCIFEROL) 2000 units (50 mcg) tablet     XIFAXAN 550 MG TABS tablet        Allergies   Allergen Reactions     Prednisone Visual Disturbance     Trazodone Visual Disturbance     Benadryl [Diphenhydramine] Other (See Comments)     Delirium (visual and auditory hallucinations)     Oxycodone Other (See Comments)     Delirium and constipation        Review of Systems   Constitutional: Negative for chills and fever.   Respiratory: Negative for cough and shortness of breath.    Cardiovascular: Negative for chest pain. Leg swelling: bilateral feet yesterday, now improved.   Gastrointestinal: Positive for abdominal pain (intermittant upper right quadrant abd pain with meals, no R sided abd pain). Negative for abdominal distention, constipation, diarrhea, nausea and vomiting.  "  All other systems reviewed and are negative.      Physical Exam   BP: (!) 165/64  Pulse: 89  Heart Rate: 78  Temp: 97.9  F (36.6  C)  Resp: 16  Height: 177.8 cm (5' 10\")  Weight: 79.6 kg (175 lb 6.4 oz)  SpO2: 100 %      Physical Exam  Gen:A&Ox3, no acute distress  HEENT:PERRL, no facial tenderness or wounds, head atraumatic, oropharynx clear, mucous membranes moist  Back: no CVA tenderness  CV:RRR without murmurs  PULM:Clear to auscultation bilaterally  Abd:soft, non-tender, negative Zimmer's sign, scant ascites, no abdominal tenderness or peritoneal signs  UE:No traumatic injuries, skin normal  LE:no traumatic injuries, skin normal, trace LE edema at ankles  Neuro:CN II-XII intact, strength 5/5 throughout, gait stable.   Skin: no rashes or ecchymoses, minimal jaundice      ED Course   11:31 AM  The patient was seen and examined by Pat Arana MD in Room ED22.       Procedures             Critical Care time:  none     The Lactic acid level is elevated due to liver disease, at this time there is no sign of severe sepsis or septic shock.       Labs Ordered and Resulted from Time of ED Arrival Up to the Time of Departure from the ED   CBC WITH PLATELETS DIFFERENTIAL - Abnormal; Notable for the following components:       Result Value    WBC 3.4 (*)     RBC Count 3.32 (*)     Hemoglobin 10.0 (*)     Hematocrit 30.7 (*)     RDW 17.3 (*)     Platelet Count 50 (*)     Absolute Lymphocytes 0.7 (*)     All other components within normal limits   COMPREHENSIVE METABOLIC PANEL - Abnormal; Notable for the following components:    Potassium 3.0 (*)     Chloride 114 (*)     Glucose 132 (*)     Bilirubin Total 4.7 (*)     Albumin 2.6 (*)     Protein Total 5.3 (*)     All other components within normal limits   ISTAT  GASES LACTATE ROSALEE POCT - Abnormal; Notable for the following components:    PCO2 Venous 34 (*)     Bicarbonate Venous 20 (*)     Lactic Acid 2.4 (*)     All other components within normal limits   PERIPHERAL IV " CATHETER   ISTAT CG4 GASES LACTATE ROSALEE NURSING POCT            Assessments & Plan (with Medical Decision Making)   53 yo M with a hx of alcoholic cirrhosis with TIPS, presenting after abnormal outpatient abdominal US done today. US done to assess TIPS patency due to recent recurrence of ascites with need for therapeutic paracentesis on 10/7. Pt feels well, and is without abdominal pain or quick recurrence of ascites since his recent paracentesis.   Vitals stable.   US discussed with the Radiology resident on call who reviewed the US results with the patient. TIPS is patent. Gallbladder wall abnormal. Has previously appeared thickened and nodular, with sludge and stones in the gallbladder. Possibility of chronic cholecystitis. Today there is concern for possible gangrenous cholecystitis with an appearance of gallbladder wall sloughing.     IV access obtained and lab testing done.   Lactic acid increased to 2.4. pH 7.37.   CBC unchanged pancytopenia with WBC 3.4, Hgb 10.0, Plts 50  CMP with stable low albumin and protein, K 3.0, Cr 1.25    12:20 PM  General Surgery consulted and will evaluate the patient.     Pt seen and Surgery team reviewed images with Radiology. Not consistent with acute or chronic cholecystitis. No intervention.     Pt, his wife and I reviewed return instructions in the event of development of RUQ pain with eating, signs of peritonitis, fever.   Follow up with Primary Care and Hepatology.       I have reviewed the nursing notes.    I have reviewed the findings, diagnosis, plan and need for follow up with the patient.    Discharge Medication List as of 10/12/2019  3:27 PM          Final diagnoses:   Biliary sludge determined by ultrasound     Aries RUIZ, am serving as a trained medical scribe to document services personally performed by Pat Arana MD, based on the provider's statements to me.   IPat MD, was physically present and have reviewed and verified the accuracy of this  note documented by Aries Barillas.     10/12/2019   [facility], [place], EMERGENCY DEPARTMENT    MD LULU Peoples Katrina Anne, MD  10/12/19 7299

## 2019-10-12 NOTE — DISCHARGE INSTRUCTIONS
Thank you for coming to the [facility] Emergency Department.     Please follow up with your liver and primary care doctors.     TIPS appears open on ultrasound.     For your gallbladder, we do not see signs of infection or severe inflammation. Please watch for the following symptoms, that are reasons to return to the ER to be rechecked:  Pain in right upper abdomen, that is worsened by eating  Diffuse abdominal pain with fever >100.4F

## 2019-10-21 ENCOUNTER — OFFICE VISIT (OUTPATIENT)
Dept: VASCULAR SURGERY | Facility: CLINIC | Age: 54
End: 2019-10-21
Payer: COMMERCIAL

## 2019-10-21 VITALS — BODY MASS INDEX: 25.54 KG/M2 | WEIGHT: 178 LBS

## 2019-10-21 DIAGNOSIS — K70.31 ALCOHOLIC CIRRHOSIS OF LIVER WITH ASCITES (H): Primary | ICD-10-CM

## 2019-10-21 ASSESSMENT — PAIN SCALES - GENERAL: PAINLEVEL: NO PAIN (0)

## 2019-10-21 NOTE — PATIENT INSTRUCTIONS
We will call you with the appointment if your TIPS REVISION.        Please check into the [facility] Waiting Room, located  at [facility], located at [address] [place] [address].       Reminders:    -No food  6 hours prior to procedure  -No clear 2 hours prior to procedure.     -Please take your morning medications as indicated with enough water to swallow.     -Please also note that you will be going home, so therefore make sure that you have a .     *We ask that you continue to take your medications and attend Paracentesis appointments as scheduled. We do not want you to discontinue your medications, especially if it is related to your liver disease.     **Should you experience any of the symptoms below please let us know.     1. Confusion- Hepatic encephalopathy. This is a dangerous symptom that can happen after the TIPS procedure. Please go immediately to the Emergency room    2. Swelling of lower legs- please notify us as soon as possible. This can happen afterwards as well. Please make sure to connect with your Hepatologist/Liver doctor for any diuretics.     3. Fever and abd pain-please call me as this may indicate an infection or so.      We will call you 2-3 days after you go home.     Follow up: We will see you at 1, 3 ,6, 12 months after TIPS placement for proper follow up with an Ultrasound to evaluate if the TIPS is still patent. Otherwise your Hepatologist will follow up with you.       Should you have any further questions, please call us anytime. It has been a pleasure to see you today.        Denise LAZARO RN, BSN  Interventional Radiology Nurse Coordinator   Phone: [phone]

## 2019-10-21 NOTE — NURSING NOTE
Vascular Rooming Note     Ivan Bell's goals for this visit include:   Chief Complaint   Patient presents with     Consult     Ivan, is being seen today for a consult regarding the TIPS procedure, feeling good, no concerns at this time, as reported by patient.     Citlali Naqvi LPN

## 2019-10-21 NOTE — LETTER
10/21/2019       RE: Ivan Bell  [id number] [address] [address]     Dear Colleague,    Thank you for referring your patient, Ivan Bell, to the [facility] at [facility]. Please see a copy of my visit note below.    S:  Mr. 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  Unfortunately, he had a EtOH relapse in 2/2019, but has again been sober for the past 7 months.  He has also required a couple paracenteses, the most recent of which was 10/7/2019 which removed 4200 mL.    O:  BP (P) 117/74 (BP Location: Right arm, Patient Position: Chair, Cuff Size: Adult Large)   Pulse (P) 89   Temp (P) 97.9  F (36.6  C) (Oral)   Wt 80.7 kg (178 lb)   SpO2 (P) 99%   BMI 25.54 kg/m       Lab Results   Component Value Date    WBC 3.4 10/12/2019     Lab Results   Component Value Date    RBC 3.32 10/12/2019     Lab Results   Component Value Date    HGB 10.0 10/12/2019     Lab Results   Component Value Date    HCT 30.7 10/12/2019     Lab Results   Component Value Date    MCV 93 10/12/2019     Lab Results   Component Value Date    MCH 30.1 10/12/2019     Lab Results   Component Value Date    MCHC 32.6 10/12/2019     Lab Results   Component Value Date    RDW 17.3 10/12/2019     Lab Results   Component Value Date    PLT 50 10/12/2019     Last Comprehensive Metabolic Panel:  Sodium   Date Value Ref Range Status   10/12/2019 143 133 - 144 mmol/L Final     Potassium   Date Value Ref Range Status   10/12/2019 3.0 (L) 3.4 - 5.3 mmol/L Final     Chloride   Date Value Ref Range Status   10/12/2019 114 (H) 94 - 109 mmol/L Final     Carbon Dioxide   Date Value Ref Range Status   10/12/2019 21 20 - 32 mmol/L Final     Anion Gap   Date Value Ref Range Status   10/12/2019 7 3 - 14 mmol/L Final     Glucose   Date Value Ref Range Status   10/12/2019 132 (H) 70 - 99 mg/dL Final     Urea Nitrogen    Date Value Ref Range Status   10/12/2019 8 7 - 30 mg/dL Final     Creatinine   Date Value Ref Range Status   10/12/2019 1.25 0.66 - 1.25 mg/dL Final     GFR Estimate   Date Value Ref Range Status   10/12/2019 64 >60 mL/min/[1.73_m2] Final     Comment:     Non  GFR Calc  Starting 12/18/2018, serum creatinine based estimated GFR (eGFR) will be   calculated using the Chronic Kidney Disease Epidemiology Collaboration   (CKD-EPI) equation.       Calcium   Date Value Ref Range Status   10/12/2019 9.0 8.5 - 10.1 mg/dL Final     Bilirubin Total   Date Value Ref Range Status   10/12/2019 4.7 (H) 0.2 - 1.3 mg/dL Final     Alkaline Phosphatase   Date Value Ref Range Status   10/12/2019 126 40 - 150 U/L Final     ALT   Date Value Ref Range Status   10/12/2019 14 0 - 70 U/L Final     AST   Date Value Ref Range Status   10/12/2019 27 0 - 45 U/L Final       I reviewed the US from 10/12/2019 which demonstrates elevated velocities in the TIPS, which have increased since prior.    A/P:  Mr. Bell has developed ascites following a brief EtOH lapse, he has now been sober 7 months.  His TIPS also demonstrates elevated velocities.  We will plan for a TIPS venogram and possible revision.    A total of 25 minutes was spent in care for the patient, of which >50% was spent in counseling and co-ordination of care.    Again, thank you for allowing me to participate in the care of your patient.      Sincerely,    Jelani Tristan MD

## 2019-10-22 ENCOUNTER — PATIENT OUTREACH (OUTPATIENT)
Dept: GASTROENTEROLOGY | Facility: CLINIC | Age: 54
End: 2019-10-22

## 2019-10-22 DIAGNOSIS — K70.31 ALCOHOLIC CIRRHOSIS OF LIVER WITH ASCITES (H): Primary | ICD-10-CM

## 2019-10-22 DIAGNOSIS — L29.9 ITCHING: ICD-10-CM

## 2019-10-22 NOTE — PROGRESS NOTES
S:  Mr. 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  Unfortunately, he had a EtOH relapse in 2/2019, but has again been sober for the past 7 months.  He has also required a couple paracenteses, the most recent of which was 10/7/2019 which removed 4200 mL.    O:  BP (P) 117/74 (BP Location: Right arm, Patient Position: Chair, Cuff Size: Adult Large)   Pulse (P) 89   Temp (P) 97.9  F (36.6  C) (Oral)   Wt 80.7 kg (178 lb)   SpO2 (P) 99%   BMI 25.54 kg/m      Lab Results   Component Value Date    WBC 3.4 10/12/2019     Lab Results   Component Value Date    RBC 3.32 10/12/2019     Lab Results   Component Value Date    HGB 10.0 10/12/2019     Lab Results   Component Value Date    HCT 30.7 10/12/2019     Lab Results   Component Value Date    MCV 93 10/12/2019     Lab Results   Component Value Date    MCH 30.1 10/12/2019     Lab Results   Component Value Date    MCHC 32.6 10/12/2019     Lab Results   Component Value Date    RDW 17.3 10/12/2019     Lab Results   Component Value Date    PLT 50 10/12/2019     Last Comprehensive Metabolic Panel:  Sodium   Date Value Ref Range Status   10/12/2019 143 133 - 144 mmol/L Final     Potassium   Date Value Ref Range Status   10/12/2019 3.0 (L) 3.4 - 5.3 mmol/L Final     Chloride   Date Value Ref Range Status   10/12/2019 114 (H) 94 - 109 mmol/L Final     Carbon Dioxide   Date Value Ref Range Status   10/12/2019 21 20 - 32 mmol/L Final     Anion Gap   Date Value Ref Range Status   10/12/2019 7 3 - 14 mmol/L Final     Glucose   Date Value Ref Range Status   10/12/2019 132 (H) 70 - 99 mg/dL Final     Urea Nitrogen   Date Value Ref Range Status   10/12/2019 8 7 - 30 mg/dL Final     Creatinine   Date Value Ref Range Status   10/12/2019 1.25 0.66 - 1.25 mg/dL Final     GFR Estimate   Date Value Ref Range Status   10/12/2019 64 >60 mL/min/[1.73_m2] Final     Comment:     Non  GFR  Calc  Starting 12/18/2018, serum creatinine based estimated GFR (eGFR) will be   calculated using the Chronic Kidney Disease Epidemiology Collaboration   (CKD-EPI) equation.       Calcium   Date Value Ref Range Status   10/12/2019 9.0 8.5 - 10.1 mg/dL Final     Bilirubin Total   Date Value Ref Range Status   10/12/2019 4.7 (H) 0.2 - 1.3 mg/dL Final     Alkaline Phosphatase   Date Value Ref Range Status   10/12/2019 126 40 - 150 U/L Final     ALT   Date Value Ref Range Status   10/12/2019 14 0 - 70 U/L Final     AST   Date Value Ref Range Status   10/12/2019 27 0 - 45 U/L Final       I reviewed the US from 10/12/2019 which demonstrates elevated velocities in the TIPS, which have increased since prior.    A/P:  Mr. Bell has developed ascites following a brief EtOH lapse, he has now been sober 7 months.  His TIPS also demonstrates elevated velocities.  We will plan for a TIPS venogram and possible revision.    A total of 25 minutes was spent in care for the patient, of which >50% was spent in counseling and co-ordination of care.

## 2019-10-22 NOTE — PROGRESS NOTES
Patient is complaining of increased itching. He states it is generalized all over his body. It seems to be worse at night and keeps him from resting well. He discussed this with Dr. Tristan, who he states asked him to discuss possible treatments with Dr. Bales.   Per Dr. Bales, he should try topical therapies- calamine lotion, steroid cream etc- first.  If that doesn't help, zoloft 50mg po Q24 would be the next step. Patient's wife Elicia states that the patient has already been trying different topical lotions without success. He will try a steroid cream next, but requests a prescription for zoloft be sent. She will contact office if/when patient would like to try this medication.  Addendum: patient would like to try zoloft, as the topical agents he has used do not work for him. Script sent to  pharmacy in [place] per wife request.

## 2019-10-23 ENCOUNTER — TELEPHONE (OUTPATIENT)
Dept: VASCULAR SURGERY | Facility: CLINIC | Age: 54
End: 2019-10-23

## 2019-10-23 NOTE — TELEPHONE ENCOUNTER
Called pt on his cell phone.     Had to leave a .    Informed him that we have him scheduled for his TIPS revision.  [place] for Tues 10/29/19.     He is to check into [facility] room at 730am for a 9am start time.       Will also send a letter today as well.   I did inform him that I would like to go ahead and set up a 1 mos f/u appt as well due to he will be hunting for the month of November.     Asked him to return my call.    Denise LAZARO RN, BSN  Interventional Radiology Nurse Coordinator   Phone: [phone]

## 2019-10-25 ENCOUNTER — TELEPHONE (OUTPATIENT)
Dept: VASCULAR SURGERY | Facility: CLINIC | Age: 54
End: 2019-10-25

## 2019-10-25 NOTE — TELEPHONE ENCOUNTER
Called pt and informed him that we there's been a time change in his tips revision procedure.   He is to check into [facility] at 11am for a 1230pm start time now with Dr Castano.     Apologized for this time change.     Pt verbalized understanding.     Denise LAZARO RN, BSN  Interventional Radiology Nurse Coordinator   Phone: [phone]

## 2019-10-29 ENCOUNTER — APPOINTMENT (OUTPATIENT)
Dept: INTERVENTIONAL RADIOLOGY/VASCULAR | Facility: CLINIC | Age: 54
End: 2019-10-29
Attending: RADIOLOGY
Payer: COMMERCIAL

## 2019-10-29 ENCOUNTER — HOSPITAL ENCOUNTER (OUTPATIENT)
Facility: CLINIC | Age: 54
Discharge: HOME | End: 2019-10-29
Attending: RADIOLOGY | Admitting: RADIOLOGY
Payer: COMMERCIAL

## 2019-10-29 ENCOUNTER — APPOINTMENT (OUTPATIENT)
Dept: MEDSURG UNIT | Facility: CLINIC | Age: 54
End: 2019-10-29
Attending: RADIOLOGY
Payer: COMMERCIAL

## 2019-10-29 VITALS
OXYGEN SATURATION: 99 % | BODY MASS INDEX: 24.77 KG/M2 | WEIGHT: 173 LBS | SYSTOLIC BLOOD PRESSURE: 129 MMHG | HEIGHT: 70 IN | HEART RATE: 81 BPM | TEMPERATURE: 98.7 F | DIASTOLIC BLOOD PRESSURE: 59 MMHG | RESPIRATION RATE: 16 BRPM

## 2019-10-29 DIAGNOSIS — K70.31 ALCOHOLIC CIRRHOSIS OF LIVER WITH ASCITES (H): ICD-10-CM

## 2019-10-29 LAB
ALBUMIN SERPL-MCNC: 2.5 G/DL (ref 3.4–5)
ALP SERPL-CCNC: 115 U/L (ref 40–150)
ALT SERPL W P-5'-P-CCNC: 14 U/L (ref 0–70)
ANION GAP SERPL CALCULATED.3IONS-SCNC: 5 MMOL/L (ref 3–14)
APTT PPP: 44 SEC (ref 22–37)
AST SERPL W P-5'-P-CCNC: 32 U/L (ref 0–45)
BILIRUB DIRECT SERPL-MCNC: 1.8 MG/DL (ref 0–0.2)
BILIRUB SERPL-MCNC: 4.6 MG/DL (ref 0.2–1.3)
BUN SERPL-MCNC: 8 MG/DL (ref 7–30)
CALCIUM SERPL-MCNC: 8.5 MG/DL (ref 8.5–10.1)
CHLORIDE SERPL-SCNC: 117 MMOL/L (ref 94–109)
CO2 SERPL-SCNC: 21 MMOL/L (ref 20–32)
CREAT SERPL-MCNC: 1.15 MG/DL (ref 0.66–1.25)
ERYTHROCYTE [DISTWIDTH] IN BLOOD BY AUTOMATED COUNT: 18.5 % (ref 10–15)
GFR SERPL CREATININE-BSD FRML MDRD: 71 ML/MIN/{1.73_M2}
GLUCOSE SERPL-MCNC: 80 MG/DL (ref 70–99)
HCT VFR BLD AUTO: 30 % (ref 40–53)
HGB BLD-MCNC: 9.6 G/DL (ref 13.3–17.7)
INR PPP: 2.06 (ref 0.86–1.14)
MCH RBC QN AUTO: 30 PG (ref 26.5–33)
MCHC RBC AUTO-ENTMCNC: 32 G/DL (ref 31.5–36.5)
MCV RBC AUTO: 94 FL (ref 78–100)
PLATELET # BLD AUTO: 65 10E9/L (ref 150–450)
POTASSIUM SERPL-SCNC: 3.5 MMOL/L (ref 3.4–5.3)
PROT SERPL-MCNC: 5.4 G/DL (ref 6.8–8.8)
RBC # BLD AUTO: 3.2 10E12/L (ref 4.4–5.9)
SODIUM SERPL-SCNC: 143 MMOL/L (ref 133–144)
WBC # BLD AUTO: 3.3 10E9/L (ref 4–11)

## 2019-10-29 PROCEDURE — 25500064 ZZH RX 255 OP 636: Performed by: RADIOLOGY

## 2019-10-29 PROCEDURE — 49083 ABD PARACENTESIS W/IMAGING: CPT

## 2019-10-29 PROCEDURE — 27210914 ZZH SHEATH CR8

## 2019-10-29 PROCEDURE — 80076 HEPATIC FUNCTION PANEL: CPT | Performed by: PHYSICIAN ASSISTANT

## 2019-10-29 PROCEDURE — C1725 CATH, TRANSLUMIN NON-LASER: HCPCS

## 2019-10-29 PROCEDURE — 85610 PROTHROMBIN TIME: CPT | Performed by: PHYSICIAN ASSISTANT

## 2019-10-29 PROCEDURE — 25000125 ZZHC RX 250: Performed by: RADIOLOGY

## 2019-10-29 PROCEDURE — 27210732 ZZH ACCESSORY CR1

## 2019-10-29 PROCEDURE — C1769 GUIDE WIRE: HCPCS

## 2019-10-29 PROCEDURE — 80048 BASIC METABOLIC PNL TOTAL CA: CPT | Performed by: PHYSICIAN ASSISTANT

## 2019-10-29 PROCEDURE — 27210888 ZZH ACCESSORY CR7

## 2019-10-29 PROCEDURE — 27211039 ZZH NEEDLE CR2

## 2019-10-29 PROCEDURE — 99152 MOD SED SAME PHYS/QHP 5/>YRS: CPT

## 2019-10-29 PROCEDURE — 27210845 ZZH DEVICE INFLATION CR5

## 2019-10-29 PROCEDURE — 85027 COMPLETE CBC AUTOMATED: CPT | Performed by: PHYSICIAN ASSISTANT

## 2019-10-29 PROCEDURE — 99153 MOD SED SAME PHYS/QHP EA: CPT

## 2019-10-29 PROCEDURE — 27210804 ZZH SHEATH CR3

## 2019-10-29 PROCEDURE — 40000166 ZZH STATISTIC PP CARE STAGE 1

## 2019-10-29 PROCEDURE — 37183 REVISION TIPS: CPT

## 2019-10-29 PROCEDURE — 25800030 ZZH RX IP 258 OP 636: Performed by: PHYSICIAN ASSISTANT

## 2019-10-29 PROCEDURE — 27210908 ZZH NEEDLE CR4

## 2019-10-29 PROCEDURE — 85730 THROMBOPLASTIN TIME PARTIAL: CPT | Performed by: PHYSICIAN ASSISTANT

## 2019-10-29 PROCEDURE — C1887 CATHETER, GUIDING: HCPCS

## 2019-10-29 PROCEDURE — 25000128 H RX IP 250 OP 636: Performed by: PHYSICIAN ASSISTANT

## 2019-10-29 PROCEDURE — 25000128 H RX IP 250 OP 636: Performed by: RADIOLOGY

## 2019-10-29 PROCEDURE — 27210780 ZZH KIT CR3

## 2019-10-29 RX ORDER — IODIXANOL 320 MG/ML
100 INJECTION, SOLUTION INTRAVASCULAR ONCE
Status: COMPLETED | OUTPATIENT
Start: 2019-10-29 | End: 2019-10-29

## 2019-10-29 RX ORDER — NICOTINE POLACRILEX 4 MG
15-30 LOZENGE BUCCAL
Status: DISCONTINUED | OUTPATIENT
Start: 2019-10-29 | End: 2019-10-29 | Stop reason: HOSPADM

## 2019-10-29 RX ORDER — SODIUM CHLORIDE 9 MG/ML
INJECTION, SOLUTION INTRAVENOUS CONTINUOUS
Status: DISCONTINUED | OUTPATIENT
Start: 2019-10-29 | End: 2019-10-29 | Stop reason: HOSPADM

## 2019-10-29 RX ORDER — DEXTROSE MONOHYDRATE 25 G/50ML
25-50 INJECTION, SOLUTION INTRAVENOUS
Status: DISCONTINUED | OUTPATIENT
Start: 2019-10-29 | End: 2019-10-29 | Stop reason: HOSPADM

## 2019-10-29 RX ORDER — NALOXONE HYDROCHLORIDE 0.4 MG/ML
.1-.4 INJECTION, SOLUTION INTRAMUSCULAR; INTRAVENOUS; SUBCUTANEOUS
Status: DISCONTINUED | OUTPATIENT
Start: 2019-10-29 | End: 2019-10-29 | Stop reason: HOSPADM

## 2019-10-29 RX ORDER — FLUMAZENIL 0.1 MG/ML
0.2 INJECTION, SOLUTION INTRAVENOUS
Status: DISCONTINUED | OUTPATIENT
Start: 2019-10-29 | End: 2019-10-29 | Stop reason: HOSPADM

## 2019-10-29 RX ORDER — HEPARIN SOD,PORCINE/0.9 % NACL 5K/1000 ML
1000-10000 INTRAVENOUS SOLUTION INTRAVENOUS
Status: COMPLETED | OUTPATIENT
Start: 2019-10-29 | End: 2019-10-29

## 2019-10-29 RX ORDER — LIDOCAINE 40 MG/G
CREAM TOPICAL
Status: DISCONTINUED | OUTPATIENT
Start: 2019-10-29 | End: 2019-10-29 | Stop reason: HOSPADM

## 2019-10-29 RX ORDER — PHYTONADIONE (VIT K1) 100 MCG
2 TABLET ORAL
Status: ON HOLD | COMMUNITY
End: 2019-10-29

## 2019-10-29 RX ORDER — ASCORBIC ACID 125 MG
200 TABLET,CHEWABLE ORAL DAILY
COMMUNITY
End: 2024-04-03

## 2019-10-29 RX ORDER — FENTANYL CITRATE 50 UG/ML
25-50 INJECTION, SOLUTION INTRAMUSCULAR; INTRAVENOUS EVERY 5 MIN PRN
Status: DISCONTINUED | OUTPATIENT
Start: 2019-10-29 | End: 2019-10-29 | Stop reason: HOSPADM

## 2019-10-29 RX ADMIN — FENTANYL CITRATE 50 MCG: 50 INJECTION INTRAMUSCULAR; INTRAVENOUS at 13:10

## 2019-10-29 RX ADMIN — HEPARIN SODIUM 5000 UNITS: 1000 INJECTION, SOLUTION INTRAVENOUS; SUBCUTANEOUS at 13:22

## 2019-10-29 RX ADMIN — MIDAZOLAM 1 MG: 1 INJECTION INTRAMUSCULAR; INTRAVENOUS at 13:21

## 2019-10-29 RX ADMIN — MIDAZOLAM 1 MG: 1 INJECTION INTRAMUSCULAR; INTRAVENOUS at 13:10

## 2019-10-29 RX ADMIN — LIDOCAINE HYDROCHLORIDE 10 ML: 10 INJECTION, SOLUTION EPIDURAL; INFILTRATION; INTRACAUDAL; PERINEURAL at 13:22

## 2019-10-29 RX ADMIN — FENTANYL CITRATE 50 MCG: 50 INJECTION INTRAMUSCULAR; INTRAVENOUS at 13:21

## 2019-10-29 RX ADMIN — IODIXANOL 15 ML: 320 INJECTION, SOLUTION INTRAVASCULAR at 13:52

## 2019-10-29 RX ADMIN — MIDAZOLAM 1 MG: 1 INJECTION INTRAMUSCULAR; INTRAVENOUS at 13:32

## 2019-10-29 RX ADMIN — FENTANYL CITRATE 50 MCG: 50 INJECTION INTRAMUSCULAR; INTRAVENOUS at 13:31

## 2019-10-29 ASSESSMENT — MIFFLIN-ST. JEOR: SCORE: 1620.97

## 2019-10-29 NOTE — DISCHARGE INSTRUCTIONS
[facility]      Interventional Radiology  Patient Instructions Following Paracentesis    AFTER YOU GO HOME:  ? If you were given sedation; we recommend that an adult stay with you for the first 24 hours.   No driving or alcoholic beverages for 24 hours.  ? Resume previous diet and medication  ? Limit strenuous physical activity such as lifting (>10 lbs.), straining, or exercising for 48 hours.  You may resume normal activity in 24 hours  ? Change gauze at the puncture site as needed to keep site dry.  Leaking of additional fluid is not uncommon during the first 24-48 hours.    CALL 911:  ? If this is a medical emergency    CALL THE PHYSICIAN:  ? If you develop a fever, severe abdominal pain or excessive bleeding from the paracentesis site within 24 hours of the procedure  ? If you experience severe lightheadedness or fainting, call you doctor immediately or come to the closest Emergency Department.  Do not drive yourself.  ? If you have questions or concerns regarding your procedure call the Radiologist            [facility]  Going Home after a TIPS revision                                                     After you go home:    Drink plenty of fluids.    Resume your normal diet    Do not scrub the procedure site vigorously for 3 days    No lotion or powder to the puncture site for 3 days    DO NOT do any strenuous exercise or lifting greater than 10 pounds for at least 2 days following your procedure    HOB elevated to at least 30 degrees. Do not lay flat for at least 2 hours after you go home.  IF YOU WERE GIVEN SEDATION    No driving or alcoholic beverages today    Do not make any important or legal decisions today    We recommend an adult stay with you for the first 24 hours    CALL THE PHYSICIAN IF:    Monitor neck site for bleeding, swelling. If any bleeding or swelling immediately apply pressure and call the doctor.    If you notice any changes in your voice or  breathing you should call your doctor immediately & come to the closest Emergency Department.  Do Not Drive Yourself.    You develop nausea or vomiting    You develop hives or a rash or any unexplained itching        [facility] INTERVENTIONAL RADIOLOGY DEPARTMENT        Procedure Physician: Dr. Gamboa                                      Date of procedure:  Telephone Numbers: [phone] Monday-Friday 8:00 am to 4:30 pm  [phone] After 4:30 pm Monday-Friday, Weekends & Holidays.   Ask for the Interventional Radiologist on call.  Someone is on call 24 hrs/day  [facility] toll free number: [phone] Monday-Friday 8:00 am to 4:30 pm  [facility] Emergency Dept: [phone]

## 2019-10-29 NOTE — PROGRESS NOTES
Dr Gamboa to see pt and wife. Pt up walking, steady on his feet; voided. IV discontinued, catheter intact. Voided. Tolerated PO, food and drink. Sites remain dry and intact. Discharge instructions reviewed with copy to pt; stated understanding. [phone]--discharged to lobby per wheelchair with family.

## 2019-10-29 NOTE — PROGRESS NOTES
10/29/19: TIPS Revision Portal Pressures  Dr. GONZALO Tristan    Pre Pressures:  Portal Vein: 21  PV End of TIPS: 21  MidTIPS: 19  HV End of TIPS: 15  Right Atrium: 7    Gradient: 14    Post 10mm PTA Pressures:  Portal Vein: 18  PV End of TIPS: 18  MidTIPS: 16  HV End of TIPS: 15  Right Atrium: 8    Gradient: 10

## 2019-10-29 NOTE — IP AVS SNAPSHOT
MRN:[id number]                      After Visit Summary   10/29/2019    Ivan Bell    MRN: [id number]           Visit Information        Department      10/29/2019 10:56 AM [facility], [place], Interventional Radiology          Review of your medicines      UNREVIEWED medicines. Ask your doctor about these medicines       Dose / Directions   B-1 100 MG Tabs  Used for:  Decompensation of cirrhosis of liver (H)      TAKE ONE TABLET BY MOUTH ONCE DAILY  Quantity:  110 tablet  Refills:  0     CONSTULOSE 10 GM/15ML solution  Used for:  Alcoholic cirrhosis of liver with ascites (H), Hepatic encephalopathy (H)  Generic drug:  lactulose      TAKE 30MLS BY MOUTH THREE TIMES DAILY AS NEEDED FOR CONSTIPATION ( TAKE AS NEEDED TO MAINTAIN 3 TO 4 BOWEL MOVEMENTS DAILY)  Quantity:  1000 mL  Refills:  11     furosemide 20 MG tablet  Commonly known as:  LASIX      Dose:  20 mg  Take 20 mg by mouth daily as needed  Refills:  2     MULTIPLE VITAMINS PO      Dose:  1 tablet  Take 1 tablet by mouth daily  Refills:  0     potassium chloride ER 20 MEQ CR tablet  Commonly known as:  K-DUR/KLOR-CON M  Used for:  Hypopotassemia      Dose:  20 mEq  Take 20 mEq by mouth daily  Quantity:  30 tablet  Refills:  1     sertraline 50 MG tablet  Commonly known as:  ZOLOFT  Used for:  Alcoholic cirrhosis of liver with ascites (H), Itching      Dose:  25 mg  Take 0.5 tablets (25 mg) by mouth daily  Quantity:  30 tablet  Refills:  3     sodium bicarbonate 650 MG tablet  Used for:  Renal tubular acidosis      Dose:  650 mg  Take 1 tablet (650 mg) by mouth 2 times daily  Quantity:  180 tablet  Refills:  3     spironolactone 100 MG tablet  Commonly known as:  ALDACTONE  Used for:  Alcoholic cirrhosis of liver with ascites (H)      Dose:  50 mg  Take 0.5 tablets (50 mg) by mouth daily  Refills:  0     vitamin D3 2000 units (50 mcg) tablet  Commonly known as:  CHOLECALCIFEROL      Dose:  1 tablet  Take 1 tablet by mouth daily  Refills:  0      Vitamin K2 100 MCG Caps      Dose:  5 capsule  Take 5 capsules by mouth daily  Refills:  0     XIFAXAN 550 MG Tabs tablet  Used for:  Hepatic encephalopathy (H)  Generic drug:  rifaximin      TAKE ONE TABLET BY MOUTH TWICE A DAY  Quantity:  180 tablet  Refills:  0              Protect others around you: Learn how to safely use, store and throw away your medicines at www.disposemymeds.org.       Follow-ups after your visit       Your next 10 appointments already scheduled    Nov 19, 2019  2:00 PM CST  (Arrive by 1:45 PM)  New Patient Visit with Alma Moses MD  [facility] Urology and [facility] for Prostate and Urologic Cancers ([facility]) [address]  [address]th [address]  [place] [address] [phone]      Dalton 15, 2020  8:00 AM CST  Lab with [facility]  [facility] Lab ([facility]) [address]  [address]  [place] [facility] [address] [phone]      Dalton 15, 2020  9:00 AM CST  (Arrive by 8:45 AM)  Return General Liver with Celestino Verduzco PA-C  [facility] Hepatology ([facility]) [address]  [address]  [place] [address] [phone]         Care Instructions       Further instructions from your care team       [facility]      Interventional Radiology  Patient Instructions Following Paracentesis    AFTER YOU GO HOME:  ? If you were given sedation; we recommend that an adult stay with you for the first 24 hours.   No driving or alcoholic beverages for 24 hours.  ? Resume previous diet and medication  ? Limit strenuous physical activity such as lifting (>10 lbs.), straining, or exercising for 48 hours.  You may resume normal activity in 24 hours  ? Change gauze at the puncture site as needed to keep site dry.  Leaking of additional fluid is not uncommon during the first 24-48 hours.    CALL 911:  ? If this is a medical emergency    CALL THE PHYSICIAN:  ? If you develop a fever, severe  abdominal pain or excessive bleeding from the paracentesis site within 24 hours of the procedure  ? If you experience severe lightheadedness or fainting, call you doctor immediately or come to the closest Emergency Department.  Do not drive yourself.  ? If you have questions or concerns regarding your procedure call the Radiologist            [facility]  Going Home after a TIPS revision                                                     After you go home:    Drink plenty of fluids.    Resume your normal diet    Do not scrub the procedure site vigorously for 3 days    No lotion or powder to the puncture site for 3 days    DO NOT do any strenuous exercise or lifting greater than 10 pounds for at least 2 days following your procedure    HOB elevated to at least 30 degrees. Do not lay flat for at least 2 hours after you go home.  IF YOU WERE GIVEN SEDATION    No driving or alcoholic beverages today    Do not make any important or legal decisions today    We recommend an adult stay with you for the first 24 hours    CALL THE PHYSICIAN IF:    Monitor neck site for bleeding, swelling. If any bleeding or swelling immediately apply pressure and call the doctor.    If you notice any changes in your voice or breathing you should call your doctor immediately & come to the closest Emergency Department.  Do Not Drive Yourself.    You develop nausea or vomiting    You develop hives or a rash or any unexplained itching        [facility] INTERVENTIONAL RADIOLOGY DEPARTMENT        Procedure Physician: Dr. Gamboa                                      Date of procedure:  Telephone Numbers: [phone] Monday-Friday 8:00 am to 4:30 pm  [phone] After 4:30 pm Monday-Friday, Weekends & Holidays.   Ask for the Interventional Radiologist on call.  Someone is on call 24 hrs/day  [facility] toll free number: [phone] Monday-Friday 8:00 am to 4:30 pm  [facility] Emergency Dept: [phone]          Additional Information About  "Your Visit       MyChart Information    [email or web address] gives you secure access to your electronic health record. If you see a primary care provider, you can also send messages to your care team and make appointments. If you have questions, please call your primary care clinic.  If you do not have a primary care provider, please call [phone] and they will assist you.       Care EveryWhere ID    This is your Care EveryWhere ID. This could be used by other organizations to access your [facility] medical records  [id number]       Your Vitals Were  Most recent update: 10/29/2019  3:51 PM    Blood Pressure   132/60          Pulse   81          Temperature   98.7  F (37.1  C) (Oral)          Respirations   16          Height   1.778 m (5' 10\")             Weight   78.5 kg (173 lb)    Pulse Oximetry   99%    BMI (Body Mass Index)   24.82 kg/m           Primary Care Provider Office Phone #    [facility] [place] [phone]      Equal Access to Services    KAITLYN CAMACHO AH: Hadii aad ku hadasho Soomaali, waaxda luqadaha, qaybta kaalmada adeegyada, waxay idiin hayaan han kharatova gaitan . So [facility] [phone].    ATENCIÓN: Si habla español, tiene a lee disposición servicios gratuitos de asistencia lingüística. Llame al [phone].    We comply with applicable federal civil rights laws and [place] laws. We do not discriminate on the basis of race, color, national origin, age, disability, sex, sexual orientation, or gender identity.           Thank you!    Thank you for choosing [facility] for your care. Our goal is always to provide you with excellent care. Hearing back from our patients is one way we can continue to improve our services. Please take a few minutes to complete the written survey that you may receive in the mail after you visit with us. Thank you!            Medication List      ASK your doctor about these medications          Morning Afternoon Evening Bedtime As Needed    B-1 100 MG Tabs  INSTRUCTIONS:  TAKE " ONE TABLET BY MOUTH ONCE DAILY                     CONSTULOSE 10 GM/15ML solution  INSTRUCTIONS:  TAKE 30MLS BY MOUTH THREE TIMES DAILY AS NEEDED FOR CONSTIPATION ( TAKE AS NEEDED TO MAINTAIN 3 TO 4 BOWEL MOVEMENTS DAILY)  Generic drug:  lactulose                     furosemide 20 MG tablet  Also known as:  LASIX  INSTRUCTIONS:  Take 20 mg by mouth daily as needed                     MULTIPLE VITAMINS PO  INSTRUCTIONS:  Take 1 tablet by mouth daily                     potassium chloride ER 20 MEQ CR tablet  Also known as:  K-DUR/KLOR-CON M  INSTRUCTIONS:  Take 20 mEq by mouth daily                     sertraline 50 MG tablet  Also known as:  ZOLOFT  INSTRUCTIONS:  Take 0.5 tablets (25 mg) by mouth daily                     sodium bicarbonate 650 MG tablet  INSTRUCTIONS:  Take 1 tablet (650 mg) by mouth 2 times daily                     spironolactone 100 MG tablet  Also known as:  ALDACTONE  INSTRUCTIONS:  Take 0.5 tablets (50 mg) by mouth daily                     vitamin D3 2000 units (50 mcg) tablet  Also known as:  CHOLECALCIFEROL  INSTRUCTIONS:  Take 1 tablet by mouth daily                     Vitamin K2 100 MCG Caps  INSTRUCTIONS:  Take 5 capsules by mouth daily                     XIFAXAN 550 MG Tabs tablet  INSTRUCTIONS:  TAKE ONE TABLET BY MOUTH TWICE A DAY  Generic drug:  rifaximin

## 2019-10-29 NOTE — PRE-PROCEDURE
GENERAL PRE-PROCEDURE:   Procedure:  TIPS revision    Written consent obtained?: Yes    Risks and benefits: Risks, benefits and alternatives were discussed    Consent given by:  Patient  Patient states understanding of procedure being performed: Yes    Patient's understanding of procedure matches consent: Yes    Procedure consent matches procedure scheduled: Yes    Expected level of sedation:  Moderate  Appropriately NPO:  Yes  ASA Class:  Class 3- Severe systemic disease, definite functional limitations  Mallampati  :  Grade 2- soft palate, base of uvula, tonsillar pillars, and portion of posterior pharyngeal wall visible  Lungs:  Lungs clear with good breath sounds bilaterally  Heart:  Normal heart sounds and rate  History & Physical reviewed:  History and physical reviewed and no updates needed  Statement of review:  I have reviewed the lab findings, diagnostic data, medications, and the plan for sedation

## 2019-10-29 NOTE — PROGRESS NOTES
Slept for two hours.  Right neck site tender.  Upper abdomen pain resolving.  Tolerated food, fluids, ambulation and urination.  Awaiting MD to update family.  Will discharge to home after update.

## 2019-10-29 NOTE — PROGRESS NOTES
Returned from IR s/p TIPS revision as well as paracentesis.  VSS.  C/O significant pain in upper abdomen immediately upon return, but is now sleepingRIJ site CDI.  Right lateral, low abdomen site CDI.  MD aware of previous pain.  Sleeping in bed.

## 2019-10-29 NOTE — PROGRESS NOTES
Interventional Radiology Intra-procedural Nursing Note    Patient Name: Ivan Bell  Medical Record Number: [id number]  Today's Date: October 29, 2019    Start Time: 1310  End of procedure time: 1340  Procedure: Transjugular intrahepatic portosystemic shunt venogram, revision, paracentesis  Fire Safety Score: 2    Consent Review/Timeout Performed by: Dr. Jelani Tristan  Procedure Performed By: Dr. Jelani Tristan, Dr. Gualberto Gamboa    Fentanyl administered: 150 mcg  Versed administered: 3 mg    Start of Sedation Time: 1310  End of Sedation Time: 1340  Total Sedation Time: 30 minutes    Procedure start time: 1310  Puncture time: 1315  Procedure end time: 1340    Report given to: Edie DOBBS 2A  Time pt departs:  1400  : NA    Other Notes:  Alert male transported via cart from  to IR Procedure Room 5 for planned intervention.  ID band confirmed and patient acknowledges understanding of planned procedure. Patient repositioned to procedure table via hover-mat and positioned supine.  Patient prepped and draped per policy see VS flowsheet, MAR for further information.       Prior to procedure, distal pulses were identified and marked via palpation. +3, bilaterally.    Right internal jugular identified under image guidance.  Sheath in place at 1315.   Intervention with plasty performed.   Right abdominal site identified using image guidance.  A total of 3300 cc of waleska colored fluid obtained via aspiration. Site cleansed and dressed per protocol.     RIJ Introducer removed at 1340.  Manual pressure held for 10 minutes.  Rightsite is cleansed and dressed per protocol.     Patient condition post procedure is stable.   Patient returned to  for post-procedure monitoring.    Delphine Hutchins RN

## 2019-10-29 NOTE — PROCEDURES
[facility], [place]    Procedure: IR Procedure Note  Date/Time: 10/29/2019 1:56 PM  Performed by: Gualberto Gamboa MD  Authorized by: Gualberto Gamboa MD   IR Fellow Physician: Gualberto Gamboa  Other(s) attending procedure: Staff: Dr. Tristan     UNIVERSAL PROTOCOL   Site Marked: Yes  Prior Images Obtained and Reviewed:  Yes  Required items: Required blood products, implants, devices and special equipment available    Patient identity confirmed:  Verbally with patient  Patient was reevaluated immediately before administering moderate or deep sedation or anesthesia  Confirmation Checklist:  Patient's identity using two indicators, relevant allergies, procedure was appropriate and matched the consent or emergent situation and correct equipment/implants were available  Time out: Immediately prior to the procedure a time out was called    Preparation: Patient was prepped and draped in usual sterile fashion      SEDATION    Patient Sedated: Yes    Vital signs: Vital signs monitored during sedation    See dictated procedure note for full details.  Findings: -From right internal jugular approach, TIPS venogram and dilatation was performed. Paracentesis was performed from right hemiabdomen approach.     Specimens: none    Complications: None    Condition: Stable    Plan: -Bed rest as ordered  -To follow up with Dr. Tristan in IR clinic after 1 month with a ultrasound doppler TIPS/ hepatic   -Patient may be discharged later today if meets unit discharge guidelines     PROCEDURE   Patient Tolerance:  Patient tolerated the procedure well with no immediate complications    Time of Sedation in Minutes by Physician:  30

## 2019-10-29 NOTE — IP AVS SNAPSHOT
[facility], [place], Interventional Radiology  [address] [address]  Phone:  [phone]                                    After Visit Summary   10/29/2019    Ivan Bell    MRN: [id number]           After Visit Summary Signature Page    I have received my discharge instructions, and my questions have been answered. I have discussed any challenges I see with this plan with the nurse or doctor.    ..........................................................................................................................................  Patient/Patient Representative Signature      ..........................................................................................................................................  Patient Representative Print Name and Relationship to Patient    ..................................................               ................................................  Date                                   Time    ..........................................................................................................................................  Reviewed by Signature/Title    ...................................................              ..............................................  Date                                               Time          22EPIC Rev 08/18

## 2019-10-31 ENCOUNTER — TELEPHONE (OUTPATIENT)
Dept: VASCULAR SURGERY | Facility: CLINIC | Age: 54
End: 2019-10-31

## 2019-10-31 NOTE — TELEPHONE ENCOUNTER
Called pt to f/u on him s/p tips revision with Dr Tristan.    Informed him that I am calling to f/u on him s/p tips revision     Inquired on how he is feeling.     I did ask that he return my call to check in as I know that he will be hunting soon.     We will want him to return in 1 mos time.     Left my direct line for callback.     Denise LAZARO RN, BSN  Interventional Radiology Nurse Coordinator   Phone: [phone]

## 2019-11-01 ENCOUNTER — HOSPITAL ENCOUNTER (EMERGENCY)
Facility: CLINIC | Age: 54
Discharge: HOME | End: 2019-11-01
Attending: EMERGENCY MEDICINE | Admitting: EMERGENCY MEDICINE
Payer: COMMERCIAL

## 2019-11-01 ENCOUNTER — PATIENT OUTREACH (OUTPATIENT)
Dept: GASTROENTEROLOGY | Facility: CLINIC | Age: 54
End: 2019-11-01

## 2019-11-01 VITALS
RESPIRATION RATE: 18 BRPM | OXYGEN SATURATION: 99 % | TEMPERATURE: 98.1 F | HEIGHT: 70 IN | HEART RATE: 85 BPM | SYSTOLIC BLOOD PRESSURE: 148 MMHG | DIASTOLIC BLOOD PRESSURE: 64 MMHG | BODY MASS INDEX: 24.77 KG/M2 | WEIGHT: 173 LBS

## 2019-11-01 DIAGNOSIS — R31.9 URINARY TRACT INFECTION WITH HEMATURIA, SITE UNSPECIFIED: ICD-10-CM

## 2019-11-01 DIAGNOSIS — N39.0 URINARY TRACT INFECTION WITH HEMATURIA, SITE UNSPECIFIED: ICD-10-CM

## 2019-11-01 DIAGNOSIS — R31.9 HEMATURIA, UNSPECIFIED TYPE: ICD-10-CM

## 2019-11-01 LAB
ALBUMIN SERPL-MCNC: 2.5 G/DL (ref 3.4–5)
ALBUMIN UR-MCNC: 30 MG/DL
ALP SERPL-CCNC: 126 U/L (ref 40–150)
ALT SERPL W P-5'-P-CCNC: 18 U/L (ref 0–70)
ANION GAP SERPL CALCULATED.3IONS-SCNC: 2 MMOL/L (ref 3–14)
APPEARANCE UR: ABNORMAL
AST SERPL W P-5'-P-CCNC: 30 U/L (ref 0–45)
BASOPHILS # BLD AUTO: 0 10E9/L (ref 0–0.2)
BASOPHILS NFR BLD AUTO: 0.6 %
BILIRUB SERPL-MCNC: 5.4 MG/DL (ref 0.2–1.3)
BILIRUB UR QL STRIP: NEGATIVE
BUN SERPL-MCNC: 7 MG/DL (ref 7–30)
CALCIUM SERPL-MCNC: 8.8 MG/DL (ref 8.5–10.1)
CHLORIDE SERPL-SCNC: 115 MMOL/L (ref 94–109)
CO2 SERPL-SCNC: 25 MMOL/L (ref 20–32)
COLOR UR AUTO: ABNORMAL
CREAT SERPL-MCNC: 1.13 MG/DL (ref 0.66–1.25)
DIFFERENTIAL METHOD BLD: ABNORMAL
EOSINOPHIL # BLD AUTO: 0.5 10E9/L (ref 0–0.7)
EOSINOPHIL NFR BLD AUTO: 10.5 %
ERYTHROCYTE [DISTWIDTH] IN BLOOD BY AUTOMATED COUNT: 18.5 % (ref 10–15)
GFR SERPL CREATININE-BSD FRML MDRD: 72 ML/MIN/{1.73_M2}
GLUCOSE SERPL-MCNC: 85 MG/DL (ref 70–99)
GLUCOSE UR STRIP-MCNC: NEGATIVE MG/DL
HCT VFR BLD AUTO: 34.3 % (ref 40–53)
HGB BLD-MCNC: 10.9 G/DL (ref 13.3–17.7)
HGB UR QL STRIP: ABNORMAL
IMM GRANULOCYTES # BLD: 0 10E9/L (ref 0–0.4)
IMM GRANULOCYTES NFR BLD: 0.4 %
INR PPP: 2.11 (ref 0.86–1.14)
KETONES UR STRIP-MCNC: NEGATIVE MG/DL
LACTATE BLD-SCNC: 1.4 MMOL/L (ref 0.7–2)
LEUKOCYTE ESTERASE UR QL STRIP: ABNORMAL
LYMPHOCYTES # BLD AUTO: 1 10E9/L (ref 0.8–5.3)
LYMPHOCYTES NFR BLD AUTO: 21.2 %
MCH RBC QN AUTO: 29.7 PG (ref 26.5–33)
MCHC RBC AUTO-ENTMCNC: 31.8 G/DL (ref 31.5–36.5)
MCV RBC AUTO: 94 FL (ref 78–100)
MONOCYTES # BLD AUTO: 0.4 10E9/L (ref 0–1.3)
MONOCYTES NFR BLD AUTO: 7.9 %
NEUTROPHILS # BLD AUTO: 2.8 10E9/L (ref 1.6–8.3)
NEUTROPHILS NFR BLD AUTO: 59.4 %
NITRATE UR QL: NEGATIVE
NRBC # BLD AUTO: 0 10*3/UL
NRBC BLD AUTO-RTO: 0 /100
PH UR STRIP: 7 PH (ref 5–7)
PLATELET # BLD AUTO: 71 10E9/L (ref 150–450)
POTASSIUM SERPL-SCNC: 3.8 MMOL/L (ref 3.4–5.3)
PROT SERPL-MCNC: 5.8 G/DL (ref 6.8–8.8)
RBC # BLD AUTO: 3.67 10E12/L (ref 4.4–5.9)
RBC #/AREA URNS AUTO: >182 /HPF (ref 0–2)
SODIUM SERPL-SCNC: 143 MMOL/L (ref 133–144)
SOURCE: ABNORMAL
SP GR UR STRIP: 1.01 (ref 1–1.03)
UROBILINOGEN UR STRIP-MCNC: 4 MG/DL (ref 0–2)
WBC # BLD AUTO: 4.7 10E9/L (ref 4–11)
WBC #/AREA URNS AUTO: 63 /HPF (ref 0–5)

## 2019-11-01 PROCEDURE — 87086 URINE CULTURE/COLONY COUNT: CPT | Performed by: EMERGENCY MEDICINE

## 2019-11-01 PROCEDURE — 99284 EMERGENCY DEPT VISIT MOD MDM: CPT | Mod: 25 | Performed by: EMERGENCY MEDICINE

## 2019-11-01 PROCEDURE — 96365 THER/PROPH/DIAG IV INF INIT: CPT | Performed by: EMERGENCY MEDICINE

## 2019-11-01 PROCEDURE — 99284 EMERGENCY DEPT VISIT MOD MDM: CPT | Mod: Z6 | Performed by: EMERGENCY MEDICINE

## 2019-11-01 PROCEDURE — 83605 ASSAY OF LACTIC ACID: CPT | Performed by: EMERGENCY MEDICINE

## 2019-11-01 PROCEDURE — 85610 PROTHROMBIN TIME: CPT | Performed by: EMERGENCY MEDICINE

## 2019-11-01 PROCEDURE — 80053 COMPREHEN METABOLIC PANEL: CPT | Performed by: EMERGENCY MEDICINE

## 2019-11-01 PROCEDURE — 85025 COMPLETE CBC W/AUTO DIFF WBC: CPT | Performed by: EMERGENCY MEDICINE

## 2019-11-01 PROCEDURE — 81001 URINALYSIS AUTO W/SCOPE: CPT | Performed by: EMERGENCY MEDICINE

## 2019-11-01 PROCEDURE — 25000128 H RX IP 250 OP 636: Performed by: EMERGENCY MEDICINE

## 2019-11-01 RX ORDER — CEFTRIAXONE 1 G/1
1 INJECTION, POWDER, FOR SOLUTION INTRAMUSCULAR; INTRAVENOUS ONCE
Status: COMPLETED | OUTPATIENT
Start: 2019-11-01 | End: 2019-11-01

## 2019-11-01 RX ORDER — SULFAMETHOXAZOLE/TRIMETHOPRIM 800-160 MG
1 TABLET ORAL 2 TIMES DAILY
Qty: 20 TABLET | Refills: 0 | Status: SHIPPED | OUTPATIENT
Start: 2019-11-01 | End: 2019-12-31

## 2019-11-01 RX ADMIN — CEFTRIAXONE 1 G: 1 INJECTION, POWDER, FOR SOLUTION INTRAMUSCULAR; INTRAVENOUS at 21:30

## 2019-11-01 ASSESSMENT — MIFFLIN-ST. JEOR: SCORE: 1620.97

## 2019-11-01 NOTE — ED TRIAGE NOTES
Ivan comes to the ED for evaluation of dysuria and hematuria today.  Had a TIPS procedure on 10/29.  Denies fever.

## 2019-11-01 NOTE — PROGRESS NOTES
Patient's wife called to discuss patient's increase in shaking this week. She states he does not have other symptoms of encephalopathy and has clear thinking and adequate stools. She notes that he does think he is passing another kidney stone, as he is having pain like he has had in the past when passing a stone. He is not running a fever, does not have chills with his shaking. In review of medications, patient is taking spironolactone 50 mg daily, started about 3 weeks ago. Previous to that time, he had stopped the spironolactone altogether when having severe shaking in August. At that time the shaking resolved once he stopped the spironolactone.   Per Dr. Bales discontinue spironolactone. Discussed with patient's wife who verbalized understanding.  Patient called back to report that he passed a very large blood clot along with urination. He states it was very difficult and painful. Johanna Obrien NP from nephrology reviewed symptoms and recommends patient be seen today in emergency department because of the blood clot. Patient agreeable to this plan and will call wife to bring him in to the [facility] ER.

## 2019-11-01 NOTE — ED AVS SNAPSHOT
[facility], [place], Emergency Department  [address] [place] [address]  Phone:  [phone]                                    Ivan Bell   MRN: [id number]    Department:  [facility], Emergency Department   Date of Visit:  11/1/2019           After Visit Summary Signature Page    I have received my discharge instructions, and my questions have been answered. I have discussed any challenges I see with this plan with the nurse or doctor.    ..........................................................................................................................................  Patient/Patient Representative Signature      ..........................................................................................................................................  Patient Representative Print Name and Relationship to Patient    ..................................................               ................................................  Date                                   Time    ..........................................................................................................................................  Reviewed by Signature/Title    ...................................................              ..............................................  Date                                               Time          22EPIC Rev 08/18

## 2019-11-02 ENCOUNTER — HEALTH MAINTENANCE LETTER (OUTPATIENT)
Age: 54
End: 2019-11-02

## 2019-11-02 LAB
BACTERIA SPEC CULT: NO GROWTH
Lab: NORMAL
SPECIMEN SOURCE: NORMAL

## 2019-11-02 ASSESSMENT — ENCOUNTER SYMPTOMS
DYSURIA: 1
WEAKNESS: 0
BLOOD IN STOOL: 0
FATIGUE: 0
COUGH: 0
CONSTIPATION: 0
COLOR CHANGE: 0
HEMATURIA: 1
SHORTNESS OF BREATH: 0
VOMITING: 0
BACK PAIN: 0
FREQUENCY: 1
DIAPHORESIS: 0
DIARRHEA: 0
ABDOMINAL DISTENTION: 0
FLANK PAIN: 0
FEVER: 0
NAUSEA: 0
DIFFICULTY URINATING: 0
CHILLS: 0
ABDOMINAL PAIN: 0

## 2019-11-02 NOTE — DISCHARGE INSTRUCTIONS
TODAY'S VISIT:  You were seen today for discomfort and urgency with urination and bloody urine.   - It appears you may have a urinary tract infection (UTI) on your studies today, but as we discussed, your urine culture is pending.   - It is also possible that these urine tests represent something other than a UTI as we discussed, so please discuss these symptoms with your usual providers to ensure that you do not need further/more advanced testing or evaluation.   - If you had any labs or imaging/radiology tests performed today, you should also discuss these tests with your usual provider.     FOLLOW-UP:  Please make an appointment to follow up with:  - Your Primary Care and usual Providers as soon as possible. Call tomorrow morning to your usual providers (as many are open Saturday mornings), Monday at the latest, to be seen no later than Monday/Tuesday for a recheck.   - Return to the Emergency Department with any concerns/new or worsening symptoms prior to that follow-up appointment.   - Have your provider review the results from today's visit with you again to make sure no further follow-up or additional testing is needed based on those results.     PRESCRIPTIONS / MEDICATIONS:  - Take prescribed antibiotics until completed or told otherwise by a medical provider.     RETURN TO THE EMERGENCY DEPARTMENT  Return to the Emergency Department at any time for any new or worsening symptoms or any concerns.   - Watch closely for signs/symptoms as we discussed (increasing abdominal fullness or discomfort, ongoing or worsening bleeding, inability to pass urine or decreased urine output, fevers, lightheadedness, or any other new or worsening symptom or complaint or any concern). If you experience any of this, please immediately notify a medical provider or return to the nearest Emergency Department.

## 2019-11-02 NOTE — ED PROVIDER NOTES
History     Chief Complaint   Patient presents with     Hematuria     Dysuria     HPI  Ivan Bell is a 54 year old male with PMHx of decompensated alcoholic cirrhosis, previously complicated with ascites and hepatic encephalopathy, now s/p recent TIPS procedure, alcohol use disorder, HTN, high triglycerides, kidney stones, gout,  who presents to the Emergency Department today for evaluation of hematuria and dysuria.     Pt has been doing well since TIPS. He is unsure if had catheter placed for the procedure, but did not have any bleeding or pain immediately thereafter. Hematuria today started out more bright (no clots), but has begun to clear somewhat, though not resolved. Associated with mild discomfort w/ urination and some frequency/urgency. No difficulty passing urine. No abdominal pain or fullness. No side, flank or back pain. Has a hx of kidney stones, but says does not fee like that. No testicular/scrotal or other  symptoms. No traumatic hx. Not aware of any prostate issues and no sx's for such. No known malignancies. No fevers or chills. No myalgias. Systemically feels well, just the urine changes. Not on blood thinners, no known clotting disorders. No CP, shortness of breath, LH, dizziness, fatigue or other symptoms for significant blood loss. No other new symptoms or complaints at this time. Please see ROS for further details.     I have reviewed the Medications, Allergies, Past Medical and Surgical History, and Social History in the mygall system.    Past Medical History:   Diagnosis Date     Alcoholic cirrhosis (H)      Alcoholic hepatitis 03/2019     Hypertension      Left calcaneus fracture 1/9/2006 January 16, 2006: Fell 10 feet from ladder onto left foot on frozen ground on 1/9/06 at home.  Immediate pain and unable to walk- seen at [place] and diagnosed with calcaneus fracture     Portal vein thrombosis     left occlusion, partial main       Past Surgical History:   Procedure Laterality Date      ANKLE SURGERY Left      COLONOSCOPY N/A 3/31/2016    Procedure: COLONOSCOPY;  Surgeon: Rhys Uriostegui MD;  Location:  GI     ESOPHAGOSCOPY, GASTROSCOPY, DUODENOSCOPY (EGD), COMBINED N/A 3/31/2016    Procedure: COMBINED ESOPHAGOSCOPY, GASTROSCOPY, DUODENOSCOPY (EGD);  Surgeon: Rhys Uriostegui MD;  Location:  GI     ESOPHAGOSCOPY, GASTROSCOPY, DUODENOSCOPY (EGD), COMBINED N/A 3/9/2018    Procedure: COMBINED ESOPHAGOSCOPY, GASTROSCOPY, DUODENOSCOPY (EGD), BIOPSY SINGLE OR MULTIPLE;  EGD;  Surgeon: Gonzalo Wahl MD;  Location:  GI     ESOPHAGOSCOPY, GASTROSCOPY, DUODENOSCOPY (EGD), COMBINED N/A 2019    Procedure: ESOPHAGOGASTRODUODENOSCOPY (EGD);  Surgeon: Gonzalo Wahl MD;  Location:  GI     IR PARACENTESIS  10/29/2019     IR TRANSVEN INTRAHEPATIC PORTOSYST REV  10/29/2019     KNEE SURGERY Left      KNEE SURGERY Right      SIGMOIDOSCOPY FLEXIBLE N/A 10/31/2017    Procedure: SIGMOIDOSCOPY FLEXIBLE;;  Surgeon: Armaan Adams MD;  Location:  GI     TIPS Procedure  2018       Family History   Problem Relation Age of Onset     Family History Negative Mother      Family History Negative Father      Hypertension Father      Cerebrovascular Disease Father 87     Breast Cancer Maternal Grandmother      Rheumatoid Arthritis Daughter      Depression Daughter      Cancer - colorectal No family hx of      Prostate Cancer No family hx of      Liver Disease No family hx of        Social History     Tobacco Use     Smoking status: Former Smoker     Packs/day: 0.00     Types: Dip, chew, snus or snuff     Last attempt to quit: 1998     Years since quittin.1     Smokeless tobacco: Current User     Last attempt to quit: 10/24/2017     Tobacco comment: 1 tin per 10 days.   Substance Use Topics     Alcohol use: No     Alcohol/week: 17.5 standard drinks     Types: 21 Cans of beer per week     Comment: last etoh 16, did have Odouls ~2017         Review  "of Systems   Constitutional: Negative for chills, diaphoresis, fatigue and fever.   Respiratory: Negative for cough and shortness of breath.    Cardiovascular: Negative for chest pain.   Gastrointestinal: Negative for abdominal distention, abdominal pain, blood in stool, constipation, diarrhea, nausea and vomiting.   Genitourinary: Positive for dysuria, frequency, hematuria and urgency. Negative for difficulty urinating, flank pain, penile pain, scrotal swelling and testicular pain.   Musculoskeletal: Negative for back pain.   Skin: Negative for color change and rash.   Neurological: Negative for weakness.   All other systems reviewed and are negative.    Physical Exam   BP: (!) 151/71  Pulse: 85  Temp: 98  F (36.7  C)  Resp: 16  Height: 177.8 cm (5' 10\")  Weight: 78.5 kg (173 lb)  SpO2: 99 %    Physical Exam  CONSTITUTIONAL: Well-developed and well-nourished. Awake and alert. Non-toxic appearance. No acute distress.   HENT:   - Head: Normocephalic and atraumatic.   - Ears: Hearing and external ear grossly normal.   - Nose: Nose normal. No rhinorrhea. No epistaxis.   - Mouth/Throat: MMM  EYES: Conjunctivae and lids are normal. No scleral icterus.   NECK: Normal range of motion and phonation normal. Neck supple.  No tracheal deviation, no stridor. No edema or erythema noted.  CARDIOVASCULAR: Normal rate, regular rhythm and no appreciable abnormal heart sounds.  PULMONARY/CHEST: Normal work of breathing. No accessory muscle usage or stridor. No respiratory distress.  No appreciable abnormal breath sounds.  ABDOMEN: Soft, non-distended. No tenderness. No rigidity, rebound or guarding. No flank/cva pain.   MUSCULOSKELETAL: Extremities warm and seemingly well perfused. No edema or calf tenderness.  NEUROLOGIC: Awake, alert. Not disoriented. He displays no atrophy and no tremor. Normal tone. No seizure activity. GCS 15  SKIN: Skin is warm and dry. No rash noted. No diaphoresis. No pallor.   PSYCHIATRIC: Normal mood and " affect. Speech and behavior normal. Thought processes linear. Cognition and memory are normal.      Assessments & Plan (with Medical Decision Making)   IMPRESSION: 54 year old male w/ PMH notable for recent TIPS procedure, presenting w/ a 1-day hx of hematuria (improving), as described further above in the HPI/ROS.     Clinically, patient appears nontoxic, NAD . Vitals WNL. Otherwise on examination, no acute findings. No abdominal, back or flank pain or abnormalities, no peritoneal findings.     Ddx includes, but not limited to, post-procedural complication, UTI, intraabdominal infection, prostatitis, malgnancy, etc. (all potential causes reviewed, he will arrange close F/U to have this evaluated further. ). Think unlikely to be kidney stone when having no pain, or at least not an obstructing stone.     PLAN: labs, urine studies    RESULTS:  - Labs: No leukocytosis, Hgb not significantly changed from previous, PLT 71, No acute findings on CMP (had elevated bilirubin before, no AMBER)  - Urine: UA shows patient has >182 RBC and 63 WBC.  Large amount of leukocyte esterase and negative nitrite.    INTERVENTIONS:   - IV ceftriaxone    RE-EVALUATION:  - The patient's symptoms were slowly improving.   - Pt otherwise continues to do well here in the ED, no acute issues or apparent concerning changes in vitals or clinical appearance.    DISCUSSIONS:  - w/ ED Pharmacist: Reviewed case, findings, medical comorbidities, current medications, etc.  They would recommend Bactrim for UTI coverage prescription and close outpatient follow-up  - w/ Patient: I have reviewed the available findings, plan, need for close follow up, strict return/safety instructions with the patient and his loved one/guest. They expressed understanding and agreement with this plan. All questions answered to the best of our ability at this time.     DISPOSITION/PLANNING:  - IMPRESSION: Hematuria, UTI  - DISPOSITION: Discharge to home  - FOLLOW-UP:  w/ usual  providers/ PCP as soon as can be arranged  - PENDING:  Urine culture  - RECOMMENDATIONS: Conservative symptom management, strict return instructions  - Rx: Bactrim      ______________________________________________________________________      11/1/2019   [facility]     Sara Kruse MD  11/02/19 [id number]

## 2019-11-12 ENCOUNTER — PRE VISIT (OUTPATIENT)
Dept: NEPHROLOGY | Facility: CLINIC | Age: 54
End: 2019-11-12

## 2019-11-12 NOTE — TELEPHONE ENCOUNTER
Patient with history of kidney stones coming in for kidney stone prevention discussion with Dr. Moses. Arabella not available. Patient chart reviewed, no need for call, all records available and ready for appointment.

## 2019-11-14 ENCOUNTER — MYC MEDICAL ADVICE (OUTPATIENT)
Dept: GASTROENTEROLOGY | Facility: CLINIC | Age: 54
End: 2019-11-14

## 2019-11-14 DIAGNOSIS — K70.31 ALCOHOLIC CIRRHOSIS OF LIVER WITH ASCITES (H): Primary | ICD-10-CM

## 2019-11-14 NOTE — TELEPHONE ENCOUNTER
Spoke with Elicia regarding pt's diarrhea and titration of lactulose. Elicia stated that pt is currently hunting in [place] with family and has been complaining of diarrhea since TIPS procedure (10/29/19). Pt reported that he is up all night with loose stools and wants to decrease lactulose  Per chart review, pt was placed on Bactrim for UTI on 11/1. Discussed side effects of antibiotics and that the Bactrim might be causing pt's GI issues. Per Elicia, pt stopped taking Bactrim on 11/8 and does not feel that this is what's causing diarrhea. Pt is currently taking lactulose 30 ml twice daily. Discussed decreasing evening dose of lactulose to 15 ml and seeing if this helps with loose stools. Reviewed that pt could decrease to 30 ml daily but pt would need to keep count of number of stools and take an additional dose of lactulose if less than 3 BMs per day and/or starts becoming confused. Elicia stated that pt is with family who is very knowledgeable and supportive of pt's liver disease and would encourage to take a dose of lactulose if needed. Will update Mary Goff, pt's RN Care Coordinator.

## 2019-11-19 ENCOUNTER — OFFICE VISIT (OUTPATIENT)
Dept: NEPHROLOGY | Facility: CLINIC | Age: 54
End: 2019-11-19
Payer: COMMERCIAL

## 2019-11-19 ENCOUNTER — ANCILLARY PROCEDURE (OUTPATIENT)
Dept: CT IMAGING | Facility: CLINIC | Age: 54
End: 2019-11-19
Attending: INTERNAL MEDICINE
Payer: COMMERCIAL

## 2019-11-19 VITALS
DIASTOLIC BLOOD PRESSURE: 70 MMHG | HEART RATE: 82 BPM | SYSTOLIC BLOOD PRESSURE: 140 MMHG | WEIGHT: 170 LBS | HEIGHT: 70 IN | BODY MASS INDEX: 24.34 KG/M2

## 2019-11-19 DIAGNOSIS — N13.30 HYDRONEPHROSIS, UNSPECIFIED HYDRONEPHROSIS TYPE: ICD-10-CM

## 2019-11-19 DIAGNOSIS — N20.0 RECURRENT KIDNEY STONES: ICD-10-CM

## 2019-11-19 DIAGNOSIS — N20.0 RECURRENT KIDNEY STONES: Primary | ICD-10-CM

## 2019-11-19 ASSESSMENT — PAIN SCALES - GENERAL: PAINLEVEL: NO PAIN (0)

## 2019-11-19 ASSESSMENT — MIFFLIN-ST. JEOR: SCORE: 1607.36

## 2019-11-19 NOTE — PATIENT INSTRUCTIONS
Dear Ivan Bell      Your were seen in the [facility].  Basic advice to avoid kidney stones  - Drink 100 ounces of fluid daily (urine volume should be 80 ounces per day)  - low sodium diet (less than 2400 mg sodium per day- 500-700 mg per meal)  - minimize processed foods  - three servings daily of food/beverage high in calcium.  Please have one high calcium food three times a day with meals - can be either 8 oz milk, 6 oz yogurt or 2 oz low sodium cheese or 8 oz calcium fortified OJ/dairy alternative)   - unsweetened flax milk is the preferred dairy alternative  ---- Total should be at least 1000 mg calcium a day from food  - Protein (meat) in moderation  - add lemon or lime to foods and beverages.  Consider 2-4 oz lemon or lime juice diluted in water.  I advise against lemonade since it is high in sugar and low in actual lemon juice.     [email or web address]    Today we discussed   1 - please continue low salt diet  2 - please get CT scan today to check on swelling in right kidney  3 - please complete TWO 24 hour urine collections to look for possible cause of kidney stones    We are suggesting the following medications:  No change      Please set up appointment with:  Alma Moses MD in 4 months    It was a pleasure meeting with you today. Thank you for allowing me and my team the privilege of caring for you today. Please let us know if there is anything else we can do for you.    Take care!  Alma Moses MD  Department of Medicine  Division of Renal Diseases and Hypertension  [facility]    Email: [email or web address]  You may reach a nurse by calling the urology clinic at [phone]

## 2019-11-19 NOTE — NURSING NOTE
"Chief Complaint   Patient presents with     Consult For     KIDNEY STONE       Blood pressure (!) 140/70, pulse 82, height 1.778 m (5' 10\"), weight 77.1 kg (170 lb). Body mass index is 24.39 kg/m .    Patient Active Problem List   Diagnosis     Hypertriglyceridemia     Gouty arthropathy     Erectile dysfunction     CARDIOVASCULAR SCREENING; LDL GOAL LESS THAN 130     24 hour contact given to patient      Hypertension goal BP (blood pressure) < 140/90     Alcoholic cirrhosis of liver with ascites (H)     Calculus of gallbladder without cholecystitis     Nephrolithiasis     Decompensation of cirrhosis of liver (H)       Allergies   Allergen Reactions     Prednisone Visual Disturbance     Trazodone Visual Disturbance     Benadryl [Diphenhydramine] Other (See Comments)     Delirium (visual and auditory hallucinations)     Oxycodone Other (See Comments)     Delirium and constipation       Current Outpatient Medications   Medication Sig Dispense Refill     CONSTULOSE 10 GM/15ML solution TAKE 30MLS BY MOUTH THREE TIMES DAILY AS NEEDED FOR CONSTIPATION ( TAKE AS NEEDED TO MAINTAIN 3 TO 4 BOWEL MOVEMENTS DAILY) 1000 mL 11     furosemide (LASIX) 20 MG tablet Take 20 mg by mouth daily as needed  2     Menaquinone-7 (VITAMIN K2) 100 MCG CAPS Take 5 capsules by mouth daily       MULTIPLE VITAMINS PO Take 1 tablet by mouth daily       potassium chloride ER (K-DUR/KLOR-CON M) 20 MEQ CR tablet Take 20 mEq by mouth daily 30 tablet 1     sertraline (ZOLOFT) 50 MG tablet Take 0.5 tablets (25 mg) by mouth daily 30 tablet 3     sodium bicarbonate 650 MG tablet Take 1 tablet (650 mg) by mouth 2 times daily 180 tablet 3     Thiamine HCl (B-1) 100 MG TABS TAKE ONE TABLET BY MOUTH ONCE DAILY 110 tablet 0     vitamin D3 (CHOLECALCIFEROL) 2000 units (50 mcg) tablet Take 1 tablet by mouth daily       XIFAXAN 550 MG TABS tablet TAKE ONE TABLET BY MOUTH TWICE A  tablet 0       Social History     Tobacco Use     Smoking status: Former " Smoker     Packs/day: 0.00     Types: Dip, chew, snus or snuff     Last attempt to quit: 1998     Years since quittin.2     Smokeless tobacco: Current User     Last attempt to quit: 10/24/2017     Tobacco comment: 1 tin per 10 days.   Substance Use Topics     Alcohol use: No     Alcohol/week: 17.5 standard drinks     Types: 21 Cans of beer per week     Comment: last etoh 16, did have Odouls ~2017     Drug use: No       Precious Dixon LPN  2019  1:53 PM    [employer] order form filled out and faxed to [employer] at [phone].    Precious Dixon LPN  19  2:22 PM

## 2019-11-19 NOTE — LETTER
2019     RE: Ivan Bell  [id number] [facility] [id number]     Dear Colleague,    Thank you for referring your patient, Ivan Bell, to the [facility] UROLOGY AND INST FOR PROSTATE AND UROLOGIC CANCERS at [facility]. Please see a copy of my visit note below.      [facility]ph[facility]    Alma Moses MD  2019     Name: Ivan Bell  MRN: [id number]  Age: 54 year old  : 1965  Referring provider: Ryanne Obrien     Assessment and Plan:  Ivan Bell is a 54 year old male presenting for nephrolithiasis.     # Nephrolithiasis - Bilateral kidney stones noted on renal US . Non obstructing. Has gout. Has never had stone analysis. Fluid intake limited by need to restrict fluid due to cirrhosis. No h/o stents/lithotripsy. Passes stones regularly.   -- Recommended he continue low salt diet  -- CT scan today to check on stone burden as well as hydronephrosis noted on previous renal ultrasound done summer 2019  -- complete TWO 24 hour urine collections to look for possible cause of kidney stones  -- Continue consuming vegetables and decrease potato intake by 1 serving    # AMBER - Resolved. Creat 1.13, peak creat 1.4. Baseline creat ~ 1.0. No difficulty voiding.   Etiology of AMBER felt to be poor oral intake prior to admission  HE.    - Has h/o recurrent AMBER  - likely to have some element of CKD as previous baseline creatinine had been closer to 0.8 mg/dL and creatinine ~1 mg/dL usually associated with eGFR <60 in patients with cirrhosis.  Low muscle mass will cause overestimation of kidney function.     # ETOH cirrhosis c/b HE/EV - Sober since . T bili 4.5, down from 9.2 upon admission, but was in the 20's in . On Lactulose/Rifaximin/Thiamine. Spironolactone discontinued by Dr Bales.     # HTN - Blood pressure well controlled.    - Off Spironolactone.     Two 24 hour urine at his convenience.  CT scan  today.       Alma Moses MD  [facility]  Department of Medicine  Division of Renal Disease and Hypertension  [phone]    Follow-up: Return in about 4 months (around 3/19/2020).    Reason For Visit:   Nephrolithiasis.     HPI:   Ivan Bell is a 54 year old male with PMHx of decompensated alcoholic cirrhosis, previously complicated with ascites and hepatic encephalopathy, s/p TIPS procedure  on 10/29, alcohol use disorder, HTN, high triglycerides, kidney stones, gout, who presents for evaluation per the request of Ryanne Obrien NP. Patient was last seen by Ryanne Obrien on 8/30/19 at which time bilateral kidney stones were noted on renal US (6/6/19). He reported passing kidneys stones once or twice a year and has never required surgery. He was recommended to follow up with our clinic today for further management.     Patient was admitted to the ED on 11/1/19 for evaluation of hematuria and dysuria. A TIPS procedure was performed, which resolved his difficulty urinating. He noted that his symptoms at the time felt different than his previous kidney stones.     Patient currently denies abdominal or flank pain. No fevers or chills. No difficulty urinating or hematuria. His energy level has been stable and was able to go on a hunting trip without feeling short of breath. Patient endorses some lower extremity edema and is continued on lasix. 3.3L was removed from his paracentesis in the ED on 10/29. Patient has tried to adhere to a low salt diet for years in the setting of cirrhosis.    Family history is positive for kidney stones in 2 of his children and his father.     Patient's diet consists of many meals throughout the day.  He particularly enjoys snacking on peanuts and fruits throughout the day. Consumes about 3 servings of fruit per day and 2 servings of vegetables.  Eats large servings of potatoes, steak, hamburgers, and fish in the evening for dinner.  His wife often  makes him protein powder and yogurt based smoothies.     Last imaging: Renal US on 6/6/2019 showed bilateral renal stones.   Last Surgery: None       Review of Systems:   Pertinent items are noted in HPI or as below, remainder of complete ROS is negative.      Active Medications:     Current Outpatient Medications:      CONSTULOSE 10 GM/15ML solution, TAKE 30MLS BY MOUTH THREE TIMES DAILY AS NEEDED FOR CONSTIPATION ( TAKE AS NEEDED TO MAINTAIN 3 TO 4 BOWEL MOVEMENTS DAILY), Disp: 1000 mL, Rfl: 11     furosemide (LASIX) 20 MG tablet, Take 20 mg by mouth daily as needed, Disp: , Rfl: 2     Menaquinone-7 (VITAMIN K2) 100 MCG CAPS, Take 5 capsules by mouth daily, Disp: , Rfl:      MULTIPLE VITAMINS PO, Take 1 tablet by mouth daily, Disp: , Rfl:      potassium chloride ER (K-DUR/KLOR-CON M) 20 MEQ CR tablet, Take 20 mEq by mouth daily, Disp: 30 tablet, Rfl: 1     sertraline (ZOLOFT) 50 MG tablet, Take 0.5 tablets (25 mg) by mouth daily, Disp: 30 tablet, Rfl: 3     sodium bicarbonate 650 MG tablet, Take 1 tablet (650 mg) by mouth 2 times daily, Disp: 180 tablet, Rfl: 3     Thiamine HCl (B-1) 100 MG TABS, TAKE ONE TABLET BY MOUTH ONCE DAILY, Disp: 110 tablet, Rfl: 0     vitamin D3 (CHOLECALCIFEROL) 2000 units (50 mcg) tablet, Take 1 tablet by mouth daily, Disp: , Rfl:      XIFAXAN 550 MG TABS tablet, TAKE ONE TABLET BY MOUTH TWICE A DAY, Disp: 180 tablet, Rfl: 0     Allergies:   Prednisone; Trazodone; Benadryl [diphenhydramine]; and Oxycodone      Past Medical History:  Alcohol cirrhosis  Alcohol hepatitis  Hypertension  Left calcaneus fracture  Portal vein thrombosis  Hypertriglyceridemia  Gouty arthropathy  Erectile dysfunction  Nephrolithiasis     Past Surgical History:  Ankle surgery  Colonoscopy  Esophagoscopy, gastroscopy, duodenoscopy, combined x 3  IR paracentesis  TIPS procedure  Sigmoidoscopy    Family History:   Father: Hypertension, cerebrovascular disease  Maternal grandmother: Breast cancer  Daughter:  "Rheumatoid arthritis, depression      Social History:   Former smoker  Hx of alcohol abuse   in winter    Physical Exam:  BP (!) 140/70 (BP Location: Right arm, Patient Position: Sitting, Cuff Size: Adult Regular)   Pulse 82   Ht 1.778 m (5' 10\")   Wt 77.1 kg (170 lb)   BMI 24.39 kg/m      GENERAL APPEARANCE: alert and no distress  EYES: No scleral icterus, pupils equal  HENT: NC/AT, mouth without ulcers or lesions  Endocrine: No goiter, no moon facies  Pulmonary: Breathing comfortably.   CV: 1+ lower extremity edema bilaterally.   MS: No evidence of inflammation in joints, no muscle tenderness  SKIN: No rash, warm, dry, no cyanosis  NEURO: Mentation intact and speech normal    Laboratory:  CMP:  Recent Labs   Lab Test 11/01/19  2013 10/29/19  1155 10/12/19  1213 10/07/19  0740 08/30/19  0819  08/02/19  0708  06/07/19  0644  06/06/19  0609 06/05/19  1655 06/05/19  0626    143 143 142 142   < > 138   < > 144  --  143 142 143   POTASSIUM 3.8 3.5 3.0* 3.5 3.2*   < > 4.3   < > 3.4   < > 3.3* 3.4 3.4   CHLORIDE 115* 117* 114* 115* 116*   < > 110*   < > 120*  --  120* 118* 120*   CO2 25 21 21 22 19*   < > 22   < > 15*  --  14* 16* 14*   ANIONGAP 2* 5 7 5 7   < > 6   < > 9  --  9 7 9   GLC 85 80 132* 146* 103*   < > 89   < > 93  --  81 197* 94   BUN 7 8 8 9 10   < > 11   < > 9  --  7 8 10   CR 1.13 1.15 1.25 1.32* 1.04   < > 1.18   < > 1.18  --  1.27* 1.19 1.23   GFRESTIMATED 72 71 64 60* 80   < > 69   < > 69  --  63 68 65   GFRESTBLACK 84 82 74 69 >90   < > 80   < > 80  --  73 79 76   JULISSA 8.8 8.5 9.0 8.1* 8.3*   < > 9.0   < > 9.1  --  8.8 8.7 8.8   MAG  --   --   --   --   --   --   --   --  2.0  --  1.8 1.8 1.9   PHOS  --   --   --   --  2.5  --  2.8  --  2.1*  --  2.2* 2.0* 1.8*   PROTTOTAL 5.8* 5.4* 5.3* 5.4*  --    < >  --    < > 5.7*  --  5.3*  --  5.3*   ALBUMIN 2.5* 2.5* 2.6* 2.3* 2.5*   < > 2.8*   < > 3.5  --  3.4  --  3.0*   BILITOTAL 5.4* 4.6* 4.7* 4.4*  --    < >  --    < > 8.0*  --  " 6.8*  --  7.3*   ALKPHOS 126 115 126 140  --    < >  --    < > 127  --  111  --  131   AST 30 32 27 36  --    < >  --    < > 37  --  38  --  34   ALT 18 14 14 15  --    < >  --    < > 14  --  14  --  13    < > = values in this interval not displayed.       CBC:  Recent Labs   Lab Test 11/01/19  2013 10/29/19  1155 10/12/19  1213 10/07/19  0740   HGB 10.9* 9.6* 10.0* 10.2*   WBC 4.7 3.3* 3.4* 3.4*   RBC 3.67* 3.20* 3.32* 3.40*   HCT 34.3* 30.0* 30.7* 31.7*   MCV 94 94 93 93   MCH 29.7 30.0 30.1 30.0   MCHC 31.8 32.0 32.6 32.2   RDW 18.5* 18.5* 17.3* 17.8*   PLT 71* 65* 50* 61*       INR:  Recent Labs   Lab Test 11/01/19  2013 10/29/19  1155 10/07/19  0740 07/22/19  0702  06/05/19  0626  05/14/18  0712  03/10/16  0610   INR 2.11* 2.06* 2.18* 1.93*   < > 2.45*   < > 1.33*   < > 1.98*   PTT  --  44*  --   --   --  49*  --  29  --  45*    < > = values in this interval not displayed.       ABG:  Recent Labs   Lab Test 03/14/19  0602   O2PER 21.0        URINE STUDIES:  Recent Labs   Lab Test 11/01/19  1730 06/06/19  1215 06/05/19  0615 03/14/19  1300  12/27/13  0918 12/18/12  1642   COLOR Light Red Yellow Yellow Dark Yellow   < > Yellow Yellow   APPEARANCE Slightly Cloudy Clear Clear Slightly Cloudy   < > Clear Clear   URINEGLC Negative Negative Negative Negative   < > Negative Negative   URINEBILI Negative Negative Negative Large*   < > Negative Negative   URINEKETONE Negative Negative Negative Negative   < > Negative 15*   SG 1.015 1.007 1.007 1.009   < > 1.020 1.015   UBLD Large* Moderate* Moderate* Small*   < > Negative Trace*   URINEPH 7.0 6.5 7.5* 6.5   < > 6.0 6.0   PROTEIN 30* Negative 10* Negative   < > Negative Negative   UROBILINOGEN  --   --   --   --   --  1.0 0.2   NITRITE Negative Negative Negative Negative   < > Negative Negative   LEUKEST Large* Moderate* Large* Small*   < > Trace* Negative   RBCU >182* 68* 136* 3*   < > O - 2 O - 2   WBCU 63* 19* 58* 5   < > O - 2 O - 2    < > = values in this interval  not displayed.     No lab results found.    PTH:   Recent Labs   Lab Test 08/02/19 0708   PTHI <7*       IRON STUDIES:   Recent Labs   Lab Test 08/02/19  0708 02/25/16  1630   IRON 113 51   * 180*   IRONSAT 47* 28   JOAQUIN 123 1,306*     Imaging:  US Renal - 6/6/19  1.  Stable appearance of mild-to-moderate right hydronephrosis.  2.  Bilateral renal stones.  3.  Small volume of free intraperitoneal fluid.    Per radiology.      Scribe Disclosure:  I, Woodrow Fonseca, am serving as a scribe to document services personally performed by Alma Moses MD at this visit, based upon the provider's statements to me. All documentation has been reviewed by the aforementioned provider prior to being entered into the official medical record.    Again, thank you for allowing me to participate in the care of your patient.      Sincerely,    Alma Moses MD

## 2019-11-19 NOTE — PROGRESS NOTES
[facility] Nephrology Comprehensive Stone Clinic    Alma Moses MD  2019     Name: Ivan Bell  MRN: [id number]  Age: 54 year old  : 1965  Referring provider: Ryanne Obrien     Assessment and Plan:  Ivan Bell is a 54 year old male presenting for nephrolithiasis.     # Nephrolithiasis - Bilateral kidney stones noted on renal US . Non obstructing. Has gout. Has never had stone analysis. Fluid intake limited by need to restrict fluid due to cirrhosis. No h/o stents/lithotripsy. Passes stones regularly.   -- Recommended he continue low salt diet  -- CT scan today to check on stone burden as well as hydronephrosis noted on previous renal ultrasound done summer 2019  -- complete TWO 24 hour urine collections to look for possible cause of kidney stones  -- Continue consuming vegetables and decrease potato intake by 1 serving    # AMBER - Resolved. Creat 1.13, peak creat 1.4. Baseline creat ~ 1.0. No difficulty voiding.   Etiology of AMBER felt to be poor oral intake prior to admission  HE.    - Has h/o recurrent AMBER  - likely to have some element of CKD as previous baseline creatinine had been closer to 0.8 mg/dL and creatinine ~1 mg/dL usually associated with eGFR <60 in patients with cirrhosis.  Low muscle mass will cause overestimation of kidney function.     # ETOH cirrhosis c/b HE/EV - Sober since . T bili 4.5, down from 9.2 upon admission, but was in the 20's in . On Lactulose/Rifaximin/Thiamine. Spironolactone discontinued by Dr Bales.     # HTN - Blood pressure well controlled.    - Off Spironolactone.     Two 24 hour urine at his convenience.  CT scan today.       Alma Moses MD  [facility]  Department of Medicine  Division of Renal Disease and Hypertension  [phone]    Follow-up: Return in about 4 months (around 3/19/2020).    Reason For Visit:   Nephrolithiasis.     HPI:   Ivan Bell is a 54 year old male with PMHx  of decompensated alcoholic cirrhosis, previously complicated with ascites and hepatic encephalopathy, s/p TIPS procedure on 10/29, alcohol use disorder, HTN, high triglycerides, kidney stones, gout, who presents for evaluation per the request of Ryanne Obrien NP. Patient was last seen by Ryanne Obrien on 8/30/19 at which time bilateral kidney stones were noted on renal US (6/6/19). He reported passing kidneys stones once or twice a year and has never required surgery. He was recommended to follow up with our clinic today for further management.     Patient was admitted to the ED on 11/1/19 for evaluation of hematuria and dysuria. A TIPS procedure was performed, which resolved his difficulty urinating. He noted that his symptoms at the time felt different than his previous kidney stones.     Patient currently denies abdominal or flank pain. No fevers or chills. No difficulty urinating or hematuria. His energy level has been stable and was able to go on a hunting trip without feeling short of breath. Patient endorses some lower extremity edema and is continued on lasix. 3.3L was removed from his paracentesis in the ED on 10/29. Patient has tried to adhere to a low salt diet for years in the setting of cirrhosis.    Family history is positive for kidney stones in 2 of his children and his father.     Patient's diet consists of many meals throughout the day.  He particularly enjoys snacking on peanuts and fruits throughout the day. Consumes about 3 servings of fruit per day and 2 servings of vegetables.  Eats large servings of potatoes, steak, hamburgers, and fish in the evening for dinner.  His wife often makes him protein powder and yogurt based smoothies.     Last imaging: Renal US on 6/6/2019 showed bilateral renal stones.   Last Surgery: None       Review of Systems:   Pertinent items are noted in HPI or as below, remainder of complete ROS is negative.      Active Medications:     Current Outpatient  "Medications:      CONSTULOSE 10 GM/15ML solution, TAKE 30MLS BY MOUTH THREE TIMES DAILY AS NEEDED FOR CONSTIPATION ( TAKE AS NEEDED TO MAINTAIN 3 TO 4 BOWEL MOVEMENTS DAILY), Disp: 1000 mL, Rfl: 11     furosemide (LASIX) 20 MG tablet, Take 20 mg by mouth daily as needed, Disp: , Rfl: 2     Menaquinone-7 (VITAMIN K2) 100 MCG CAPS, Take 5 capsules by mouth daily, Disp: , Rfl:      MULTIPLE VITAMINS PO, Take 1 tablet by mouth daily, Disp: , Rfl:      potassium chloride ER (K-DUR/KLOR-CON M) 20 MEQ CR tablet, Take 20 mEq by mouth daily, Disp: 30 tablet, Rfl: 1     sertraline (ZOLOFT) 50 MG tablet, Take 0.5 tablets (25 mg) by mouth daily, Disp: 30 tablet, Rfl: 3     sodium bicarbonate 650 MG tablet, Take 1 tablet (650 mg) by mouth 2 times daily, Disp: 180 tablet, Rfl: 3     Thiamine HCl (B-1) 100 MG TABS, TAKE ONE TABLET BY MOUTH ONCE DAILY, Disp: 110 tablet, Rfl: 0     vitamin D3 (CHOLECALCIFEROL) 2000 units (50 mcg) tablet, Take 1 tablet by mouth daily, Disp: , Rfl:      XIFAXAN 550 MG TABS tablet, TAKE ONE TABLET BY MOUTH TWICE A DAY, Disp: 180 tablet, Rfl: 0     Allergies:   Prednisone; Trazodone; Benadryl [diphenhydramine]; and Oxycodone      Past Medical History:  Alcohol cirrhosis  Alcohol hepatitis  Hypertension  Left calcaneus fracture  Portal vein thrombosis  Hypertriglyceridemia  Gouty arthropathy  Erectile dysfunction  Nephrolithiasis     Past Surgical History:  Ankle surgery  Colonoscopy  Esophagoscopy, gastroscopy, duodenoscopy, combined x 3  IR paracentesis  TIPS procedure  Sigmoidoscopy    Family History:   Father: Hypertension, cerebrovascular disease  Maternal grandmother: Breast cancer  Daughter: Rheumatoid arthritis, depression      Social History:   Former smoker  Hx of alcohol abuse   in winter    Physical Exam:  BP (!) 140/70 (BP Location: Right arm, Patient Position: Sitting, Cuff Size: Adult Regular)   Pulse 82   Ht 1.778 m (5' 10\")   Wt 77.1 kg (170 lb)   BMI 24.39 kg/m   "   GENERAL APPEARANCE: alert and no distress  EYES: No scleral icterus, pupils equal  HENT: NC/AT, mouth without ulcers or lesions  Endocrine: No goiter, no moon facies  Pulmonary: Breathing comfortably.   CV: 1+ lower extremity edema bilaterally.   MS: No evidence of inflammation in joints, no muscle tenderness  SKIN: No rash, warm, dry, no cyanosis  NEURO: Mentation intact and speech normal    Laboratory:  CMP:  Recent Labs   Lab Test 11/01/19  2013 10/29/19  1155 10/12/19  1213 10/07/19  0740 08/30/19  0819  08/02/19  0708  06/07/19  0644  06/06/19  0609 06/05/19  1655 06/05/19  0626    143 143 142 142   < > 138   < > 144  --  143 142 143   POTASSIUM 3.8 3.5 3.0* 3.5 3.2*   < > 4.3   < > 3.4   < > 3.3* 3.4 3.4   CHLORIDE 115* 117* 114* 115* 116*   < > 110*   < > 120*  --  120* 118* 120*   CO2 25 21 21 22 19*   < > 22   < > 15*  --  14* 16* 14*   ANIONGAP 2* 5 7 5 7   < > 6   < > 9  --  9 7 9   GLC 85 80 132* 146* 103*   < > 89   < > 93  --  81 197* 94   BUN 7 8 8 9 10   < > 11   < > 9  --  7 8 10   CR 1.13 1.15 1.25 1.32* 1.04   < > 1.18   < > 1.18  --  1.27* 1.19 1.23   GFRESTIMATED 72 71 64 60* 80   < > 69   < > 69  --  63 68 65   GFRESTBLACK 84 82 74 69 >90   < > 80   < > 80  --  73 79 76   JULISSA 8.8 8.5 9.0 8.1* 8.3*   < > 9.0   < > 9.1  --  8.8 8.7 8.8   MAG  --   --   --   --   --   --   --   --  2.0  --  1.8 1.8 1.9   PHOS  --   --   --   --  2.5  --  2.8  --  2.1*  --  2.2* 2.0* 1.8*   PROTTOTAL 5.8* 5.4* 5.3* 5.4*  --    < >  --    < > 5.7*  --  5.3*  --  5.3*   ALBUMIN 2.5* 2.5* 2.6* 2.3* 2.5*   < > 2.8*   < > 3.5  --  3.4  --  3.0*   BILITOTAL 5.4* 4.6* 4.7* 4.4*  --    < >  --    < > 8.0*  --  6.8*  --  7.3*   ALKPHOS 126 115 126 140  --    < >  --    < > 127  --  111  --  131   AST 30 32 27 36  --    < >  --    < > 37  --  38  --  34   ALT 18 14 14 15  --    < >  --    < > 14  --  14  --  13    < > = values in this interval not displayed.       CBC:  Recent Labs   Lab Test 11/01/19 2013  10/29/19  1155 10/12/19  1213 10/07/19  0740   HGB 10.9* 9.6* 10.0* 10.2*   WBC 4.7 3.3* 3.4* 3.4*   RBC 3.67* 3.20* 3.32* 3.40*   HCT 34.3* 30.0* 30.7* 31.7*   MCV 94 94 93 93   MCH 29.7 30.0 30.1 30.0   MCHC 31.8 32.0 32.6 32.2   RDW 18.5* 18.5* 17.3* 17.8*   PLT 71* 65* 50* 61*       INR:  Recent Labs   Lab Test 11/01/19  2013 10/29/19  1155 10/07/19  0740 07/22/19  0702  06/05/19  0626  05/14/18  0712  03/10/16  0610   INR 2.11* 2.06* 2.18* 1.93*   < > 2.45*   < > 1.33*   < > 1.98*   PTT  --  44*  --   --   --  49*  --  29  --  45*    < > = values in this interval not displayed.       ABG:  Recent Labs   Lab Test 03/14/19  0602   O2PER 21.0        URINE STUDIES:  Recent Labs   Lab Test 11/01/19  1730 06/06/19  1215 06/05/19  0615 03/14/19  1300  12/27/13  0918 12/18/12  1642   COLOR Light Red Yellow Yellow Dark Yellow   < > Yellow Yellow   APPEARANCE Slightly Cloudy Clear Clear Slightly Cloudy   < > Clear Clear   URINEGLC Negative Negative Negative Negative   < > Negative Negative   URINEBILI Negative Negative Negative Large*   < > Negative Negative   URINEKETONE Negative Negative Negative Negative   < > Negative 15*   SG 1.015 1.007 1.007 1.009   < > 1.020 1.015   UBLD Large* Moderate* Moderate* Small*   < > Negative Trace*   URINEPH 7.0 6.5 7.5* 6.5   < > 6.0 6.0   PROTEIN 30* Negative 10* Negative   < > Negative Negative   UROBILINOGEN  --   --   --   --   --  1.0 0.2   NITRITE Negative Negative Negative Negative   < > Negative Negative   LEUKEST Large* Moderate* Large* Small*   < > Trace* Negative   RBCU >182* 68* 136* 3*   < > O - 2 O - 2   WBCU 63* 19* 58* 5   < > O - 2 O - 2    < > = values in this interval not displayed.     No lab results found.    PTH:   Recent Labs   Lab Test 08/02/19  0708   PTHI <7*       IRON STUDIES:   Recent Labs   Lab Test 08/02/19  0708 02/25/16  1630   IRON 113 51   * 180*   IRONSAT 47* 28   JOAQUIN 123 1,306*     Imaging:  US Renal - 6/6/19  1.  Stable appearance of  mild-to-moderate right hydronephrosis.  2.  Bilateral renal stones.  3.  Small volume of free intraperitoneal fluid.    Per radiology.      Scribe Disclosure:  I, Woodrow Fonseca, am serving as a scribe to document services personally performed by Alma Moses MD at this visit, based upon the provider's statements to me. All documentation has been reviewed by the aforementioned provider prior to being entered into the official medical record.

## 2019-11-25 ENCOUNTER — OFFICE VISIT (OUTPATIENT)
Dept: VASCULAR SURGERY | Facility: CLINIC | Age: 54
End: 2019-11-25
Payer: COMMERCIAL

## 2019-11-25 VITALS
HEART RATE: 76 BPM | OXYGEN SATURATION: 98 % | DIASTOLIC BLOOD PRESSURE: 63 MMHG | SYSTOLIC BLOOD PRESSURE: 125 MMHG | HEIGHT: 70 IN | BODY MASS INDEX: 25.91 KG/M2 | WEIGHT: 181 LBS

## 2019-11-25 DIAGNOSIS — K70.31 ALCOHOLIC CIRRHOSIS OF LIVER WITH ASCITES (H): Primary | ICD-10-CM

## 2019-11-25 DIAGNOSIS — K70.31 ALCOHOLIC CIRRHOSIS OF LIVER WITH ASCITES (H): ICD-10-CM

## 2019-11-25 LAB
ALBUMIN SERPL-MCNC: 2.2 G/DL (ref 3.4–5)
ALP SERPL-CCNC: 125 U/L (ref 40–150)
ALT SERPL W P-5'-P-CCNC: 20 U/L (ref 0–70)
ANION GAP SERPL CALCULATED.3IONS-SCNC: 7 MMOL/L (ref 3–14)
AST SERPL W P-5'-P-CCNC: 44 U/L (ref 0–45)
BILIRUB SERPL-MCNC: 4 MG/DL (ref 0.2–1.3)
BUN SERPL-MCNC: 9 MG/DL (ref 7–30)
CALCIUM SERPL-MCNC: 8.3 MG/DL (ref 8.5–10.1)
CHLORIDE SERPL-SCNC: 120 MMOL/L (ref 94–109)
CO2 SERPL-SCNC: 21 MMOL/L (ref 20–32)
CREAT SERPL-MCNC: 1.3 MG/DL (ref 0.66–1.25)
ERYTHROCYTE [DISTWIDTH] IN BLOOD BY AUTOMATED COUNT: 18.9 % (ref 10–15)
GFR SERPL CREATININE-BSD FRML MDRD: 61 ML/MIN/{1.73_M2}
GLUCOSE SERPL-MCNC: 83 MG/DL (ref 70–99)
HCT VFR BLD AUTO: 32.8 % (ref 40–53)
HGB BLD-MCNC: 10.6 G/DL (ref 13.3–17.7)
INR PPP: 2.11 (ref 0.86–1.14)
INTERPRETATION ECG - MUSE: NORMAL
MCH RBC QN AUTO: 29.9 PG (ref 26.5–33)
MCHC RBC AUTO-ENTMCNC: 32.3 G/DL (ref 31.5–36.5)
MCV RBC AUTO: 92 FL (ref 78–100)
PLATELET # BLD AUTO: 54 10E9/L (ref 150–450)
POTASSIUM SERPL-SCNC: 3.2 MMOL/L (ref 3.4–5.3)
PROT SERPL-MCNC: 5.2 G/DL (ref 6.8–8.8)
RBC # BLD AUTO: 3.55 10E12/L (ref 4.4–5.9)
SODIUM SERPL-SCNC: 148 MMOL/L (ref 133–144)
WBC # BLD AUTO: 3.6 10E9/L (ref 4–11)

## 2019-11-25 ASSESSMENT — PAIN SCALES - GENERAL: PAINLEVEL: NO PAIN (0)

## 2019-11-25 ASSESSMENT — MIFFLIN-ST. JEOR: SCORE: 1657.26

## 2019-11-25 NOTE — PATIENT INSTRUCTIONS
We have you scheduled for your paracentesis on Monday 12/2 @ 2pm   If this doesn't work for you then please call [phone] and follow the prompts.     We will see you back in 6mos in clinic with Dr. Tristan.    Please call me with any other questions     Denise LAZARO RN, BSN  Interventional Radiology Nurse Coordinator   Phone: [phone]

## 2019-11-25 NOTE — NURSING NOTE
Vascular Rooming Note     Ivan Bell's goals for this visit include:   Chief Complaint   Patient presents with     Follow Up     Ivan, is being seen today for a follow up regarding cirrhosis, feeling fine except at times after eating sharp pains in the stomach, as reported by patient.     Citlali Naqvi LPN

## 2019-11-25 NOTE — LETTER
"11/25/2019       RE: Ivan Bell  [id number] [facility] [id number]     Dear Colleague,    Thank you for referring your patient, Ivan Bell, to the [facility] at [facility]. Please see a copy of my visit note below.    S: Mr. 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  Unfortunately, he had a EtOH relapse in 2/2019, but has again been sober for the past 8 months.   He underwent a TIPS revision on 10/29/2019 for recurrent ascites, and has not had a paracentesis since.  He does report feeling a bit full.      O:  /63 (BP Location: Left arm, Patient Position: Chair, Cuff Size: Adult Regular)   Pulse 76   Ht 5' 10\"   Wt 181 lb   SpO2 98%   BMI 25.97 kg/m       Lab Results   Component Value Date    WBC 3.6 11/25/2019     Lab Results   Component Value Date    RBC 3.55 11/25/2019     Lab Results   Component Value Date    HGB 10.6 11/25/2019     Lab Results   Component Value Date    HCT 32.8 11/25/2019     Lab Results   Component Value Date    MCV 92 11/25/2019     Lab Results   Component Value Date    MCH 29.9 11/25/2019     Lab Results   Component Value Date    MCHC 32.3 11/25/2019     Lab Results   Component Value Date    RDW 18.9 11/25/2019     Lab Results   Component Value Date    PLT 54 11/25/2019     Last Comprehensive Metabolic Panel:  Sodium   Date Value Ref Range Status   11/25/2019 148 (H) 133 - 144 mmol/L Final     Potassium   Date Value Ref Range Status   11/25/2019 3.2 (L) 3.4 - 5.3 mmol/L Final     Chloride   Date Value Ref Range Status   11/25/2019 120 (H) 94 - 109 mmol/L Final     Carbon Dioxide   Date Value Ref Range Status   11/25/2019 21 20 - 32 mmol/L Final     Anion Gap   Date Value Ref Range Status   11/25/2019 7 3 - 14 mmol/L Final     Glucose   Date Value Ref Range Status   11/25/2019 83 70 - 99 mg/dL Final     Urea Nitrogen   Date Value " Ref Range Status   11/25/2019 9 7 - 30 mg/dL Final     Creatinine   Date Value Ref Range Status   11/25/2019 1.30 (H) 0.66 - 1.25 mg/dL Final     GFR Estimate   Date Value Ref Range Status   11/25/2019 61 >60 mL/min/[1.73_m2] Final     Comment:     Non  GFR Calc  Starting 12/18/2018, serum creatinine based estimated GFR (eGFR) will be   calculated using the Chronic Kidney Disease Epidemiology Collaboration   (CKD-EPI) equation.       Calcium   Date Value Ref Range Status   11/25/2019 8.3 (L) 8.5 - 10.1 mg/dL Final     Bilirubin Total   Date Value Ref Range Status   11/25/2019 4.0 (H) 0.2 - 1.3 mg/dL Final     Alkaline Phosphatase   Date Value Ref Range Status   11/25/2019 125 40 - 150 U/L Final     ALT   Date Value Ref Range Status   11/25/2019 20 0 - 70 U/L Final     AST   Date Value Ref Range Status   11/25/2019 44 0 - 45 U/L Final     I reviewed his CT from 11/19 which demonstrates a moderate amount of ascites.    A/P: Mr. Bell  is a pleasant 53 yo male with EtOH cirrhosis leading to ascites and variceal bleeding s/p TIPS placement on 6/6/2018 and TIPS revision on 10/29/2019.  He has not required a paracentesis since TIPS revision, but does feel as though he could use a para so we will try to get him an appointment in the next week or so.  We discussed the importance of sobriety, given his relapse following TIPS placement, and the importance of a low salt diet.      A total of 20 minutes was spent in care for the patient, of which >50% was spent in counseling and co-ordination of care.    Again, thank you for allowing me to participate in the care of your patient.      Sincerely,    Jelani Tristan MD

## 2019-11-25 NOTE — PROGRESS NOTES
"S: Mr. 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  Unfortunately, he had a EtOH relapse in 2/2019, but has again been sober for the past 8 months.   He underwent a TIPS revision on 10/29/2019 for recurrent ascites, and has not had a paracentesis since.  He does report feeling a bit full.      O:  /63 (BP Location: Left arm, Patient Position: Chair, Cuff Size: Adult Regular)   Pulse 76   Ht 5' 10\"   Wt 181 lb   SpO2 98%   BMI 25.97 kg/m      Lab Results   Component Value Date    WBC 3.6 11/25/2019     Lab Results   Component Value Date    RBC 3.55 11/25/2019     Lab Results   Component Value Date    HGB 10.6 11/25/2019     Lab Results   Component Value Date    HCT 32.8 11/25/2019     Lab Results   Component Value Date    MCV 92 11/25/2019     Lab Results   Component Value Date    MCH 29.9 11/25/2019     Lab Results   Component Value Date    MCHC 32.3 11/25/2019     Lab Results   Component Value Date    RDW 18.9 11/25/2019     Lab Results   Component Value Date    PLT 54 11/25/2019     Last Comprehensive Metabolic Panel:  Sodium   Date Value Ref Range Status   11/25/2019 148 (H) 133 - 144 mmol/L Final     Potassium   Date Value Ref Range Status   11/25/2019 3.2 (L) 3.4 - 5.3 mmol/L Final     Chloride   Date Value Ref Range Status   11/25/2019 120 (H) 94 - 109 mmol/L Final     Carbon Dioxide   Date Value Ref Range Status   11/25/2019 21 20 - 32 mmol/L Final     Anion Gap   Date Value Ref Range Status   11/25/2019 7 3 - 14 mmol/L Final     Glucose   Date Value Ref Range Status   11/25/2019 83 70 - 99 mg/dL Final     Urea Nitrogen   Date Value Ref Range Status   11/25/2019 9 7 - 30 mg/dL Final     Creatinine   Date Value Ref Range Status   11/25/2019 1.30 (H) 0.66 - 1.25 mg/dL Final     GFR Estimate   Date Value Ref Range Status   11/25/2019 61 >60 mL/min/[1.73_m2] Final     Comment:     Non  GFR Calc  Starting " 12/18/2018, serum creatinine based estimated GFR (eGFR) will be   calculated using the Chronic Kidney Disease Epidemiology Collaboration   (CKD-EPI) equation.       Calcium   Date Value Ref Range Status   11/25/2019 8.3 (L) 8.5 - 10.1 mg/dL Final     Bilirubin Total   Date Value Ref Range Status   11/25/2019 4.0 (H) 0.2 - 1.3 mg/dL Final     Alkaline Phosphatase   Date Value Ref Range Status   11/25/2019 125 40 - 150 U/L Final     ALT   Date Value Ref Range Status   11/25/2019 20 0 - 70 U/L Final     AST   Date Value Ref Range Status   11/25/2019 44 0 - 45 U/L Final     I reviewed his CT from 11/19 which demonstrates a moderate amount of ascites.    A/P: Mr. Bell  is a pleasant 53 yo male with EtOH cirrhosis leading to ascites and variceal bleeding s/p TIPS placement on 6/6/2018 and TIPS revision on 10/29/2019.  He has not required a paracentesis since TIPS revision, but does feel as though he could use a para so we will try to get him an appointment in the next week or so.  We discussed the importance of sobriety, given his relapse following TIPS placement, and the importance of a low salt diet.      A total of 20 minutes was spent in care for the patient, of which >50% was spent in counseling and co-ordination of care.

## 2019-12-02 ENCOUNTER — OFFICE VISIT (OUTPATIENT)
Dept: INFUSION THERAPY | Facility: CLINIC | Age: 54
End: 2019-12-02
Attending: PHYSICIAN ASSISTANT
Payer: COMMERCIAL

## 2019-12-02 ENCOUNTER — ANCILLARY PROCEDURE (OUTPATIENT)
Dept: ULTRASOUND IMAGING | Facility: CLINIC | Age: 54
End: 2019-12-02
Attending: PHYSICIAN ASSISTANT
Payer: COMMERCIAL

## 2019-12-02 VITALS
BODY MASS INDEX: 24.68 KG/M2 | SYSTOLIC BLOOD PRESSURE: 124 MMHG | TEMPERATURE: 98.3 F | DIASTOLIC BLOOD PRESSURE: 66 MMHG | RESPIRATION RATE: 16 BRPM | OXYGEN SATURATION: 97 % | WEIGHT: 172 LBS

## 2019-12-02 DIAGNOSIS — K70.31 ALCOHOLIC CIRRHOSIS OF LIVER WITH ASCITES (H): Primary | ICD-10-CM

## 2019-12-02 PROCEDURE — P9047 ALBUMIN (HUMAN), 25%, 50ML: HCPCS | Mod: ZF | Performed by: PHYSICIAN ASSISTANT

## 2019-12-02 PROCEDURE — 25000128 H RX IP 250 OP 636: Mod: ZF | Performed by: PHYSICIAN ASSISTANT

## 2019-12-02 PROCEDURE — 27210190 US PARACENTESIS

## 2019-12-02 PROCEDURE — 25000125 ZZHC RX 250: Mod: ZF | Performed by: PHYSICIAN ASSISTANT

## 2019-12-02 RX ORDER — ALBUMIN (HUMAN) 12.5 G/50ML
12.5 SOLUTION INTRAVENOUS 4 TIMES DAILY PRN
Status: CANCELLED
Start: 2019-12-02

## 2019-12-02 RX ORDER — ALBUMIN (HUMAN) 12.5 G/50ML
12.5 SOLUTION INTRAVENOUS 4 TIMES DAILY PRN
Status: DISCONTINUED | OUTPATIENT
Start: 2019-12-02 | End: 2019-12-02 | Stop reason: HOSPADM

## 2019-12-02 RX ADMIN — LIDOCAINE HYDROCHLORIDE 10 ML: 10 INJECTION, SOLUTION EPIDURAL; INFILTRATION; INTRACAUDAL; PERINEURAL at 14:45

## 2019-12-02 RX ADMIN — ALBUMIN HUMAN 12.5 G: 0.25 SOLUTION INTRAVENOUS at 15:02

## 2019-12-02 RX ADMIN — ALBUMIN HUMAN 12.5 G: 0.25 SOLUTION INTRAVENOUS at 15:11

## 2019-12-02 RX ADMIN — ALBUMIN HUMAN 12.5 G: 0.25 SOLUTION INTRAVENOUS at 14:47

## 2019-12-02 RX ADMIN — ALBUMIN HUMAN 12.5 G: 0.25 SOLUTION INTRAVENOUS at 14:53

## 2019-12-02 NOTE — PROGRESS NOTES
Paracentesis Nursing Note  Ivan Bell presents today to [facility] for a paracentesis.    During today's appointment orders from Celestino Verduzco PA-C were completed.    Progress Note:  Patient identification verified by name and date of birth.  Assessment completed.  Vitals monitored throughout appointment and recorded in Doc Flowsheets.  See proceduralist note in ultrasound.    Vascular Access: peripheral IV placed today.  Labs: were not ordered for this appointment.    Date of consent or authorization: 10/7/19.  Invasive Procedure Safety Checklist was completed and sent for scanning.     Paracentesis performed by Portillo Hancock PA-C Radiology.    The following labs were communicated to provider performing paracentesis:  Lab Results   Component Value Date    PLT 54 11/25/2019       Total amount of ascites fluid drained: 5.7 liters.  Color of ascites fluid: yellow.  Total amount of albumin given: 50  grams.    Patient tolerated procedure well.    Post procedure,denies pain or discomfort post paracentesis.      Discharge Plan:  Discharge instructions were reviewed with patient.  Patient/Representative verbalized understanding and all questions were answered.   Discharged from [facility] in stable condition.    Maya Monroe RN       Administrations This Visit     albumin human 25 % injection 12.5 g     Admin Date  12/02/2019 Action  New Bag Dose  12.5 g Route  Intravenous Administered By  Maya Monroe RN           Admin Date  12/02/2019 Action  New Bag Dose  12.5 g Route  Intravenous Administered By  Maya Monroe, DILIA           Admin Date  12/02/2019 Action  New Bag Dose  12.5 g Route  Intravenous Administered By  Maya Monroe RN           Admin Date  12/02/2019 Action  New Bag Dose  12.5 g Route  Intravenous Administered By  Maya Monroe, DILIA          lidocaine 1 % 20 mL     Admin Date  12/02/2019 Action  Given by Other Dose  10 mL  Route  Other Administered By  Maya Monroe, DILIA                  Temp 98.3  F (36.8  C) (Oral)   Resp 16   Wt 83.6 kg (184 lb 6.4 oz)   SpO2 97%   BMI 26.46 kg/m

## 2019-12-02 NOTE — PATIENT INSTRUCTIONS
Patient Education     Discharge Instructions for Paracentesis  Paracentesis is a procedure to remove extra fluid from your belly (abdomen). This fluid buildup in the abdomen is called ascites. The procedure may have been done to take a sample of the fluid. Or, it may have been done to drain the extra fluid from your abdomen and help make you more comfortable.     Ascites is buildup of excess fluid in the abdomen.   Home care    If you have pain after the procedure, your healthcare provider can prescribe or recommend pain medicines. Take these exactly as directed. If you stopped taking other medicines before the procedure, ask your provider when you can start them again.    Take it easy for 24 hours after the procedure. Avoid physical activity until your provider says it s OK.    You will have a small bandage over the puncture site. Stitches (sutures), surgical staples, adhesive tapes, adhesive strips, or surgical glue may be used to close the incision. They also help stop bleeding and speed healing. You may take the bandage off in 24 hours.    Check the puncture site for the signs of infection listed below.  Follow-up care  Make a follow-up appointment with your healthcare provider as directed. During your follow-up visit, your provider will check your healing. Let your provider know how you are feeling. You can also discuss the cause of your ascites and if you need any further treatment.  When to seek medical advice  Call your healthcare provider if you have any of the following after the procedure:    A fever of 100.4 F (38 C) or higher    Trouble breathing    Pain that doesn't go away even after taking pain medicine    Belly pain not caused by having the skin punctured    Bleeding from the puncture site    More than a small amount of fluid leaking from the puncture site    Swollen belly    Signs of infection at the puncture site. These include increased pain, redness, or swelling, warmth, or bad-smelling  drainage.    Blood in your urine    Feeling dizzy or lightheaded, or fainting   Date Last Reviewed: 7/1/2016 2000-2018 [employer]. [address] [place] [address], [place] [address]. All rights reserved. This information is not intended as a substitute for professional medical care. Always follow your healthcare professional's instructions.

## 2019-12-12 DIAGNOSIS — K70.31 ALCOHOLIC CIRRHOSIS OF LIVER WITH ASCITES (H): ICD-10-CM

## 2019-12-12 DIAGNOSIS — K76.82 HEPATIC ENCEPHALOPATHY (H): ICD-10-CM

## 2019-12-12 RX ORDER — LACTULOSE 10 G/15ML
SOLUTION ORAL
Qty: 1000 ML | Refills: 11 | Status: SHIPPED | OUTPATIENT
Start: 2019-12-12 | End: 2020-06-05

## 2019-12-13 DIAGNOSIS — K76.82 HEPATIC ENCEPHALOPATHY (H): ICD-10-CM

## 2019-12-13 DIAGNOSIS — K74.60 DECOMPENSATION OF CIRRHOSIS OF LIVER (H): ICD-10-CM

## 2019-12-13 DIAGNOSIS — K72.90 DECOMPENSATION OF CIRRHOSIS OF LIVER (H): ICD-10-CM

## 2019-12-13 NOTE — TELEPHONE ENCOUNTER
Requested Prescriptions   Pending Prescriptions Disp Refills     rifaximin (XIFAXAN) 550 MG TABS tablet 180 tablet 0     Sig: Take 1 tablet (550 mg) by mouth 2 times daily  Last Written Prescription Date:  09/27/2019 #180 x 0  Last filled - not provided  Last office visit: 8/23/2019 [facility] Office Visit:  None         There is no refill protocol information for this order        Thiamine HCl (B-1) 100 MG TABS 110 tablet 0     Sig: Take 1 tablet by mouth daily  Last Written Prescription Date:  09/27/2019 #110 x 0  Last filled - not provided  Last office visit: 8/23/2019 [facility] Office Visit:  None         There is no refill protocol information for this order

## 2019-12-16 RX ORDER — METHION/INOS/CHOL BT/B COM/LIV 110MG-86MG
1 CAPSULE ORAL DAILY
Qty: 90 TABLET | Refills: 3 | Status: SHIPPED | OUTPATIENT
Start: 2019-12-16 | End: 2021-02-01

## 2019-12-16 NOTE — TELEPHONE ENCOUNTER
Routing refill request to provider for review/approval because:  Should prescriptions be sent to [facility] - Gonzalo Miramontes MD or are you willing to refill?  Griselda Rush RN

## 2019-12-26 ENCOUNTER — PATIENT OUTREACH (OUTPATIENT)
Dept: GASTROENTEROLOGY | Facility: CLINIC | Age: 54
End: 2019-12-26

## 2019-12-26 NOTE — PROGRESS NOTES
Attempted to reach patient for check in, no answer, message left requesting call back, number provided.  Patient's wife returned call. She states Ivan has been complaining of increased stooling and wants to cut back on lactulose. She is not certain if he is exaggerating. After discussion it was determined to try to cut back slightly on the lactulose and assess the response.   Elicia left message over the weekend stating that after decreasing lactulose, patient exhibited signs of encephalopathy along with continued loose stools. Recommended that patient be seen and evaluated along with labs to see if there is an infectious process causing the loose stools. Elicia states she will call his PCP office and proceed to urgent care if unable to be seen.  In review of chart, patient went in and had lab work drawn with noted critical potassium of 2.5. He was instructed to report to ER for this critical value and further assessment.

## 2019-12-31 ENCOUNTER — HOSPITAL ENCOUNTER (OUTPATIENT)
Facility: CLINIC | Age: 54
Setting detail: OBSERVATION
Discharge: HOME | End: 2020-01-01
Attending: FAMILY MEDICINE | Admitting: FAMILY MEDICINE
Payer: COMMERCIAL

## 2019-12-31 ENCOUNTER — TELEPHONE (OUTPATIENT)
Dept: FAMILY MEDICINE | Facility: CLINIC | Age: 54
End: 2019-12-31

## 2019-12-31 DIAGNOSIS — K70.31 ALCOHOLIC CIRRHOSIS OF LIVER WITH ASCITES (H): ICD-10-CM

## 2019-12-31 DIAGNOSIS — E87.6 HYPOKALEMIA: ICD-10-CM

## 2019-12-31 LAB
ALBUMIN SERPL-MCNC: 2 G/DL (ref 3.4–5)
ALBUMIN SERPL-MCNC: 2.2 G/DL (ref 3.4–5)
ALP SERPL-CCNC: 120 U/L (ref 40–150)
ALP SERPL-CCNC: 149 U/L (ref 40–150)
ALT SERPL W P-5'-P-CCNC: 18 U/L (ref 0–70)
ALT SERPL W P-5'-P-CCNC: 19 U/L (ref 0–70)
ANION GAP SERPL CALCULATED.3IONS-SCNC: 6 MMOL/L (ref 3–14)
ANION GAP SERPL CALCULATED.3IONS-SCNC: 7 MMOL/L (ref 3–14)
AST SERPL W P-5'-P-CCNC: 33 U/L (ref 0–45)
AST SERPL W P-5'-P-CCNC: 33 U/L (ref 0–45)
BASOPHILS # BLD AUTO: 0 10E9/L (ref 0–0.2)
BASOPHILS NFR BLD AUTO: 0.5 %
BILIRUB SERPL-MCNC: 3.5 MG/DL (ref 0.2–1.3)
BILIRUB SERPL-MCNC: 3.9 MG/DL (ref 0.2–1.3)
BUN SERPL-MCNC: 8 MG/DL (ref 7–30)
BUN SERPL-MCNC: 9 MG/DL (ref 7–30)
CALCIUM SERPL-MCNC: 7.8 MG/DL (ref 8.5–10.1)
CALCIUM SERPL-MCNC: 8.1 MG/DL (ref 8.5–10.1)
CHLORIDE SERPL-SCNC: 115 MMOL/L (ref 94–109)
CHLORIDE SERPL-SCNC: 118 MMOL/L (ref 94–109)
CO2 SERPL-SCNC: 20 MMOL/L (ref 20–32)
CO2 SERPL-SCNC: 22 MMOL/L (ref 20–32)
CREAT SERPL-MCNC: 1.26 MG/DL (ref 0.66–1.25)
CREAT SERPL-MCNC: 1.28 MG/DL (ref 0.66–1.25)
DIFFERENTIAL METHOD BLD: ABNORMAL
EOSINOPHIL # BLD AUTO: 0.8 10E9/L (ref 0–0.7)
EOSINOPHIL NFR BLD AUTO: 13.8 %
ERYTHROCYTE [DISTWIDTH] IN BLOOD BY AUTOMATED COUNT: 18.6 % (ref 10–15)
ERYTHROCYTE [DISTWIDTH] IN BLOOD BY AUTOMATED COUNT: 18.6 % (ref 10–15)
GFR SERPL CREATININE-BSD FRML MDRD: 62 ML/MIN/{1.73_M2}
GFR SERPL CREATININE-BSD FRML MDRD: 63 ML/MIN/{1.73_M2}
GLUCOSE SERPL-MCNC: 108 MG/DL (ref 70–99)
GLUCOSE SERPL-MCNC: 140 MG/DL (ref 70–99)
HCT VFR BLD AUTO: 29.5 % (ref 40–53)
HCT VFR BLD AUTO: 30.1 % (ref 40–53)
HGB BLD-MCNC: 9.7 G/DL (ref 13.3–17.7)
HGB BLD-MCNC: 9.8 G/DL (ref 13.3–17.7)
IMM GRANULOCYTES # BLD: 0 10E9/L (ref 0–0.4)
IMM GRANULOCYTES NFR BLD: 0.2 %
INR PPP: 2.11 (ref 0.86–1.14)
INR PPP: 2.16 (ref 0.86–1.14)
LYMPHOCYTES # BLD AUTO: 1.1 10E9/L (ref 0.8–5.3)
LYMPHOCYTES NFR BLD AUTO: 18.7 %
MAGNESIUM SERPL-MCNC: 1.8 MG/DL (ref 1.6–2.3)
MCH RBC QN AUTO: 29.3 PG (ref 26.5–33)
MCH RBC QN AUTO: 29.7 PG (ref 26.5–33)
MCHC RBC AUTO-ENTMCNC: 32.6 G/DL (ref 31.5–36.5)
MCHC RBC AUTO-ENTMCNC: 32.9 G/DL (ref 31.5–36.5)
MCV RBC AUTO: 90 FL (ref 78–100)
MCV RBC AUTO: 90 FL (ref 78–100)
MONOCYTES # BLD AUTO: 0.5 10E9/L (ref 0–1.3)
MONOCYTES NFR BLD AUTO: 9 %
NEUTROPHILS # BLD AUTO: 3.4 10E9/L (ref 1.6–8.3)
NEUTROPHILS NFR BLD AUTO: 57.8 %
NRBC # BLD AUTO: 0 10*3/UL
NRBC BLD AUTO-RTO: 0 /100
PLATELET # BLD AUTO: 69 10E9/L (ref 150–450)
PLATELET # BLD AUTO: 83 10E9/L (ref 150–450)
POTASSIUM SERPL-SCNC: 2.5 MMOL/L (ref 3.4–5.3)
POTASSIUM SERPL-SCNC: 2.6 MMOL/L (ref 3.4–5.3)
POTASSIUM SERPL-SCNC: 2.9 MMOL/L (ref 3.4–5.3)
PROT SERPL-MCNC: 4.8 G/DL (ref 6.8–8.8)
PROT SERPL-MCNC: 5.5 G/DL (ref 6.8–8.8)
RBC # BLD AUTO: 3.27 10E12/L (ref 4.4–5.9)
RBC # BLD AUTO: 3.34 10E12/L (ref 4.4–5.9)
SODIUM SERPL-SCNC: 142 MMOL/L (ref 133–144)
SODIUM SERPL-SCNC: 146 MMOL/L (ref 133–144)
WBC # BLD AUTO: 5.5 10E9/L (ref 4–11)
WBC # BLD AUTO: 5.9 10E9/L (ref 4–11)

## 2019-12-31 PROCEDURE — 96375 TX/PRO/DX INJ NEW DRUG ADDON: CPT

## 2019-12-31 PROCEDURE — 96365 THER/PROPH/DIAG IV INF INIT: CPT

## 2019-12-31 PROCEDURE — 96366 THER/PROPH/DIAG IV INF ADDON: CPT

## 2019-12-31 PROCEDURE — 84132 ASSAY OF SERUM POTASSIUM: CPT | Performed by: FAMILY MEDICINE

## 2019-12-31 PROCEDURE — 25000132 ZZH RX MED GY IP 250 OP 250 PS 637: Performed by: FAMILY MEDICINE

## 2019-12-31 PROCEDURE — 87641 MR-STAPH DNA AMP PROBE: CPT | Performed by: FAMILY MEDICINE

## 2019-12-31 PROCEDURE — 93010 ELECTROCARDIOGRAM REPORT: CPT | Mod: Z6 | Performed by: FAMILY MEDICINE

## 2019-12-31 PROCEDURE — 80053 COMPREHEN METABOLIC PANEL: CPT | Performed by: RADIOLOGY

## 2019-12-31 PROCEDURE — 36415 COLL VENOUS BLD VENIPUNCTURE: CPT | Performed by: RADIOLOGY

## 2019-12-31 PROCEDURE — 85025 COMPLETE CBC W/AUTO DIFF WBC: CPT | Performed by: FAMILY MEDICINE

## 2019-12-31 PROCEDURE — 87640 STAPH A DNA AMP PROBE: CPT | Performed by: FAMILY MEDICINE

## 2019-12-31 PROCEDURE — 36415 COLL VENOUS BLD VENIPUNCTURE: CPT | Performed by: FAMILY MEDICINE

## 2019-12-31 PROCEDURE — 85027 COMPLETE CBC AUTOMATED: CPT | Performed by: RADIOLOGY

## 2019-12-31 PROCEDURE — 99285 EMERGENCY DEPT VISIT HI MDM: CPT | Mod: 25 | Performed by: FAMILY MEDICINE

## 2019-12-31 PROCEDURE — 96376 TX/PRO/DX INJ SAME DRUG ADON: CPT

## 2019-12-31 PROCEDURE — 93005 ELECTROCARDIOGRAM TRACING: CPT

## 2019-12-31 PROCEDURE — 85610 PROTHROMBIN TIME: CPT | Performed by: RADIOLOGY

## 2019-12-31 PROCEDURE — G0378 HOSPITAL OBSERVATION PER HR: HCPCS

## 2019-12-31 PROCEDURE — 25000128 H RX IP 250 OP 636: Performed by: FAMILY MEDICINE

## 2019-12-31 PROCEDURE — 99285 EMERGENCY DEPT VISIT HI MDM: CPT | Mod: 25

## 2019-12-31 PROCEDURE — 85610 PROTHROMBIN TIME: CPT | Performed by: FAMILY MEDICINE

## 2019-12-31 PROCEDURE — 80053 COMPREHEN METABOLIC PANEL: CPT | Performed by: FAMILY MEDICINE

## 2019-12-31 PROCEDURE — 83735 ASSAY OF MAGNESIUM: CPT | Performed by: FAMILY MEDICINE

## 2019-12-31 RX ORDER — POTASSIUM CL/LIDO/0.9 % NACL 10MEQ/0.1L
10 INTRAVENOUS SOLUTION, PIGGYBACK (ML) INTRAVENOUS
Status: DISCONTINUED | OUTPATIENT
Start: 2019-12-31 | End: 2020-01-01 | Stop reason: HOSPADM

## 2019-12-31 RX ORDER — POTASSIUM CHLORIDE 1500 MG/1
20-40 TABLET, EXTENDED RELEASE ORAL
Status: DISCONTINUED | OUTPATIENT
Start: 2019-12-31 | End: 2019-12-31

## 2019-12-31 RX ORDER — POTASSIUM CHLORIDE 1500 MG/1
20-40 TABLET, EXTENDED RELEASE ORAL
Status: DISCONTINUED | OUTPATIENT
Start: 2019-12-31 | End: 2020-01-01 | Stop reason: HOSPADM

## 2019-12-31 RX ORDER — MAGNESIUM SULFATE 1 G/100ML
1 INJECTION INTRAVENOUS ONCE
Status: COMPLETED | OUTPATIENT
Start: 2019-12-31 | End: 2019-12-31

## 2019-12-31 RX ORDER — POTASSIUM CHLORIDE 1.5 G/1.58G
20-40 POWDER, FOR SOLUTION ORAL
Status: DISCONTINUED | OUTPATIENT
Start: 2019-12-31 | End: 2020-01-01 | Stop reason: HOSPADM

## 2019-12-31 RX ORDER — POTASSIUM CL/LIDO/0.9 % NACL 10MEQ/0.1L
10 INTRAVENOUS SOLUTION, PIGGYBACK (ML) INTRAVENOUS
Status: DISCONTINUED | OUTPATIENT
Start: 2019-12-31 | End: 2019-12-31

## 2019-12-31 RX ADMIN — MAGNESIUM SULFATE HEPTAHYDRATE 1 G: 1 INJECTION, SOLUTION INTRAVENOUS at 16:37

## 2019-12-31 RX ADMIN — POTASSIUM CHLORIDE 40 MEQ: 1500 TABLET, EXTENDED RELEASE ORAL at 22:29

## 2019-12-31 RX ADMIN — Medication 10 MEQ: at 15:27

## 2019-12-31 RX ADMIN — POTASSIUM CHLORIDE 40 MEQ: 1500 TABLET, EXTENDED RELEASE ORAL at 15:26

## 2019-12-31 RX ADMIN — Medication 10 MEQ: at 17:30

## 2019-12-31 RX ADMIN — POTASSIUM CHLORIDE 40 MEQ: 1500 TABLET, EXTENDED RELEASE ORAL at 20:51

## 2019-12-31 ASSESSMENT — ENCOUNTER SYMPTOMS
CHILLS: 0
FREQUENCY: 0
ABDOMINAL PAIN: 0
FEVER: 0
WHEEZING: 0
SINUS PRESSURE: 0
DIARRHEA: 1
SORE THROAT: 0
BLOOD IN STOOL: 0
DYSURIA: 0
CONSTIPATION: 0
ABDOMINAL DISTENTION: 1
HEADACHES: 0
DIAPHORESIS: 0
COUGH: 0
SHORTNESS OF BREATH: 0
VOMITING: 0
PALPITATIONS: 0
NAUSEA: 0

## 2019-12-31 ASSESSMENT — MIFFLIN-ST. JEOR: SCORE: 1661.25

## 2019-12-31 NOTE — ED TRIAGE NOTES
Hx of alcoholic cirrosis, s/p TIPS procedure 18 months ago with revision. Seen in clinic last week, recheck of labs today. INR elevated- Potassium low. Jaundice.

## 2019-12-31 NOTE — TELEPHONE ENCOUNTER
Patient notified of lab results and advised to go to the ED per Dr. Washington. Patient will be going up there today. He was told to let the ED staff know he is there for low potassium. Spoke with Viviana DOBBS in the ED and notified her of patient and critical lab value.   Griselda Rush RN

## 2019-12-31 NOTE — TELEPHONE ENCOUNTER
Please advise patient to go directly to the ER to get evaluated.  Please call ER triage nurse for report.

## 2020-01-01 VITALS
HEART RATE: 72 BPM | OXYGEN SATURATION: 99 % | SYSTOLIC BLOOD PRESSURE: 134 MMHG | TEMPERATURE: 97.9 F | DIASTOLIC BLOOD PRESSURE: 70 MMHG | HEIGHT: 70 IN | RESPIRATION RATE: 16 BRPM | WEIGHT: 181.88 LBS | BODY MASS INDEX: 26.04 KG/M2

## 2020-01-01 LAB
ANION GAP SERPL CALCULATED.3IONS-SCNC: 6 MMOL/L (ref 3–14)
BUN SERPL-MCNC: 8 MG/DL (ref 7–30)
CALCIUM SERPL-MCNC: 7.6 MG/DL (ref 8.5–10.1)
CHLORIDE SERPL-SCNC: 122 MMOL/L (ref 94–109)
CO2 SERPL-SCNC: 21 MMOL/L (ref 20–32)
CREAT SERPL-MCNC: 1.21 MG/DL (ref 0.66–1.25)
ERYTHROCYTE [DISTWIDTH] IN BLOOD BY AUTOMATED COUNT: 18.3 % (ref 10–15)
GFR SERPL CREATININE-BSD FRML MDRD: 66 ML/MIN/{1.73_M2}
GLUCOSE SERPL-MCNC: 77 MG/DL (ref 70–99)
HCT VFR BLD AUTO: 27.3 % (ref 40–53)
HGB BLD-MCNC: 8.7 G/DL (ref 13.3–17.7)
HGB BLD-MCNC: 9.4 G/DL (ref 13.3–17.7)
MCH RBC QN AUTO: 28.7 PG (ref 26.5–33)
MCHC RBC AUTO-ENTMCNC: 31.9 G/DL (ref 31.5–36.5)
MCV RBC AUTO: 90 FL (ref 78–100)
MRSA DNA SPEC QL NAA+PROBE: NEGATIVE
PLATELET # BLD AUTO: 58 10E9/L (ref 150–450)
POTASSIUM SERPL-SCNC: 3.2 MMOL/L (ref 3.4–5.3)
POTASSIUM SERPL-SCNC: 3.3 MMOL/L (ref 3.4–5.3)
RBC # BLD AUTO: 3.03 10E12/L (ref 4.4–5.9)
SODIUM SERPL-SCNC: 149 MMOL/L (ref 133–144)
SPECIMEN SOURCE: NORMAL
WBC # BLD AUTO: 3.3 10E9/L (ref 4–11)

## 2020-01-01 PROCEDURE — 36415 COLL VENOUS BLD VENIPUNCTURE: CPT | Performed by: INTERNAL MEDICINE

## 2020-01-01 PROCEDURE — 25000132 ZZH RX MED GY IP 250 OP 250 PS 637: Performed by: PHYSICIAN ASSISTANT

## 2020-01-01 PROCEDURE — 36415 COLL VENOUS BLD VENIPUNCTURE: CPT | Performed by: PHYSICIAN ASSISTANT

## 2020-01-01 PROCEDURE — 85018 HEMOGLOBIN: CPT | Performed by: INTERNAL MEDICINE

## 2020-01-01 PROCEDURE — 85027 COMPLETE CBC AUTOMATED: CPT | Performed by: PHYSICIAN ASSISTANT

## 2020-01-01 PROCEDURE — 99235 HOSP IP/OBS SAME DATE MOD 70: CPT | Performed by: INTERNAL MEDICINE

## 2020-01-01 PROCEDURE — 80048 BASIC METABOLIC PNL TOTAL CA: CPT | Performed by: PHYSICIAN ASSISTANT

## 2020-01-01 PROCEDURE — 84132 ASSAY OF SERUM POTASSIUM: CPT | Performed by: INTERNAL MEDICINE

## 2020-01-01 PROCEDURE — 25000132 ZZH RX MED GY IP 250 OP 250 PS 637: Performed by: FAMILY MEDICINE

## 2020-01-01 PROCEDURE — 99207 ZZC CDG-CODE CATEGORY CHANGED: CPT | Performed by: INTERNAL MEDICINE

## 2020-01-01 PROCEDURE — G0378 HOSPITAL OBSERVATION PER HR: HCPCS

## 2020-01-01 RX ORDER — LACTULOSE 10 G/15ML
15 SOLUTION ORAL EVERY EVENING
Status: DISCONTINUED | OUTPATIENT
Start: 2020-01-01 | End: 2020-01-01 | Stop reason: HOSPADM

## 2020-01-01 RX ORDER — SERTRALINE HYDROCHLORIDE 25 MG/1
25 TABLET, FILM COATED ORAL DAILY
Status: DISCONTINUED | OUTPATIENT
Start: 2020-01-01 | End: 2020-01-01 | Stop reason: HOSPADM

## 2020-01-01 RX ORDER — POTASSIUM CHLORIDE 1500 MG/1
40 TABLET, EXTENDED RELEASE ORAL ONCE
Qty: 1 TABLET | Refills: 0
Start: 2020-01-01 | End: 2020-01-09

## 2020-01-01 RX ORDER — SODIUM BICARBONATE 650 MG/1
650 TABLET ORAL 2 TIMES DAILY
Status: DISCONTINUED | OUTPATIENT
Start: 2020-01-01 | End: 2020-01-01 | Stop reason: HOSPADM

## 2020-01-01 RX ORDER — LACTULOSE 10 G/15ML
30 SOLUTION ORAL EVERY MORNING
Status: DISCONTINUED | OUTPATIENT
Start: 2020-01-01 | End: 2020-01-01 | Stop reason: HOSPADM

## 2020-01-01 RX ORDER — POTASSIUM CHLORIDE 1.5 G/1.58G
40 POWDER, FOR SOLUTION ORAL DAILY
Qty: 60 PACKET | Refills: 0 | Status: SHIPPED | OUTPATIENT
Start: 2020-01-01 | End: 2020-01-01

## 2020-01-01 RX ORDER — ONDANSETRON 4 MG/1
4 TABLET, ORALLY DISINTEGRATING ORAL EVERY 6 HOURS PRN
Status: DISCONTINUED | OUTPATIENT
Start: 2020-01-01 | End: 2020-01-01 | Stop reason: HOSPADM

## 2020-01-01 RX ORDER — POTASSIUM CHLORIDE 1500 MG/1
20 TABLET, EXTENDED RELEASE ORAL 2 TIMES DAILY
Qty: 60 TABLET | Refills: 0 | Status: SHIPPED | OUTPATIENT
Start: 2020-01-01 | End: 2020-01-09 | Stop reason: DRUGHIGH

## 2020-01-01 RX ORDER — LANOLIN ALCOHOL/MO/W.PET/CERES
100 CREAM (GRAM) TOPICAL DAILY
Status: DISCONTINUED | OUTPATIENT
Start: 2020-01-01 | End: 2020-01-01 | Stop reason: HOSPADM

## 2020-01-01 RX ORDER — IBUPROFEN 600 MG/1
600 TABLET, FILM COATED ORAL EVERY 6 HOURS PRN
Status: DISCONTINUED | OUTPATIENT
Start: 2020-01-01 | End: 2020-01-01 | Stop reason: HOSPADM

## 2020-01-01 RX ORDER — ONDANSETRON 2 MG/ML
4 INJECTION INTRAMUSCULAR; INTRAVENOUS EVERY 6 HOURS PRN
Status: DISCONTINUED | OUTPATIENT
Start: 2020-01-01 | End: 2020-01-01 | Stop reason: HOSPADM

## 2020-01-01 RX ORDER — PROCHLORPERAZINE 25 MG
25 SUPPOSITORY, RECTAL RECTAL EVERY 12 HOURS PRN
Status: DISCONTINUED | OUTPATIENT
Start: 2020-01-01 | End: 2020-01-01 | Stop reason: HOSPADM

## 2020-01-01 RX ORDER — POTASSIUM CHLORIDE 1.5 G/1.58G
40 POWDER, FOR SOLUTION ORAL ONCE
Qty: 2 PACKET | Refills: 0
Start: 2020-01-01 | End: 2020-01-01

## 2020-01-01 RX ORDER — POTASSIUM CHLORIDE 1500 MG/1
20 TABLET, EXTENDED RELEASE ORAL DAILY
Status: DISCONTINUED | OUTPATIENT
Start: 2020-01-01 | End: 2020-01-01 | Stop reason: HOSPADM

## 2020-01-01 RX ORDER — NALOXONE HYDROCHLORIDE 0.4 MG/ML
.1-.4 INJECTION, SOLUTION INTRAMUSCULAR; INTRAVENOUS; SUBCUTANEOUS
Status: DISCONTINUED | OUTPATIENT
Start: 2020-01-01 | End: 2020-01-01 | Stop reason: HOSPADM

## 2020-01-01 RX ORDER — PROCHLORPERAZINE MALEATE 10 MG
10 TABLET ORAL EVERY 6 HOURS PRN
Status: DISCONTINUED | OUTPATIENT
Start: 2020-01-01 | End: 2020-01-01 | Stop reason: HOSPADM

## 2020-01-01 RX ADMIN — SODIUM BICARBONATE 650 MG TABLET 650 MG: at 07:56

## 2020-01-01 RX ADMIN — LACTULOSE 30 G: 20 SOLUTION ORAL at 07:56

## 2020-01-01 RX ADMIN — Medication 100 MG: at 07:56

## 2020-01-01 RX ADMIN — RIFAXIMIN 550 MG: 550 TABLET ORAL at 07:56

## 2020-01-01 RX ADMIN — POTASSIUM CHLORIDE 40 MEQ: 1500 TABLET, EXTENDED RELEASE ORAL at 06:12

## 2020-01-01 RX ADMIN — POTASSIUM CHLORIDE 20 MEQ: 1500 TABLET, EXTENDED RELEASE ORAL at 07:56

## 2020-01-01 RX ADMIN — SERTRALINE HYDROCHLORIDE 25 MG: 25 TABLET ORAL at 07:56

## 2020-01-01 NOTE — H&P
[facility]    History and Physical / DISCHARGE   Hospital Medicine       Date of Admission:  12/31/2019  Date of Service: 1/1/2020     Assessment & Plan   Ivan Bell is a 54 year old male with hx of etoh related cirrhosis who presents with  Hypokalemia noted on a routine lab    1. Hypokalemia- likely related to increased dose of lactulose recenlty and subsequent increased diarhhea. He was replaced from 2.5 to 3.3 and wants to be discharge home today. I discussed with him that we will need to increase his dose of KCL at discharge and that he will need repeat K done this week in clinic    2. Anemia- chronic anemia that worsened while here.11/2019 hgb10.6 and then here 9.8>9.7>8.7. This was repeated before discharge today and was 9.4.  Pt says he has had a lot of nosebleeds bc of the dry air. No other bleeding.  This will need to be followed in clinic    3. Cirrhosis related to hx etoh- stable      Disposition:  discharge today    Rianna Garcia MD            Past Medical History      Past Medical History:   Diagnosis Date     Alcoholic cirrhosis (H)      Alcoholic hepatitis 03/2019     Gout      Hypertension      Hypertriglyceridemia      Left calcaneus fracture 1/9/2006 January 16, 2006: Fell 10 feet from ladder onto left foot on frozen ground on 1/9/06 at home.  Immediate pain and unable to walk- seen at [place] and diagnosed with calcaneus fracture     Portal vein thrombosis     left occlusion, partial main     Patient Active Problem List    Diagnosis Date Noted     Hypokalemia 12/31/2019     Priority: Medium     Decompensation of cirrhosis of liver (H) 03/10/2019     Priority: Medium     Calculus of gallbladder without cholecystitis 09/23/2018     Priority: Medium     September 23, 2018 non-obstructing, incidental finding on us.        Nephrolithiasis 09/23/2018     Priority: Medium     September 23, 2018 non-obstructing, incident finding on us.        Alcoholic cirrhosis of liver  with ascites (H) 03/08/2016     Priority: Medium     September 23, 2018 now sober, ascites resolved, S/P TIP procedure 6/2018. Normal liver enzymes, diminished liver function.  INR 1.6, bilirubin 2.5, albumin 2.6. He  appears healthy. Doing well. Stressed the importance of abstaining from alcohol. See copy of recent us.     9/10/18:  Impression:   1. Patent TIPS with appropriate in stent velocities. Normal Doppler  evaluation of the remainder of the hepatic vasculature in the setting  of TIPS.  2. Cirrhotic hepatic morphology with evidence of portal hypertension,  including splenomegaly. No focal hepatic mass.  3. Cholelithiasis without evidence of cholecystitis.  4. Bilateral nonobstructing nephrolithiasis.            Hypertension goal BP (blood pressure) < 140/90 12/18/2012     Priority: Medium     24 hour contact given to patient  01/02/2012     Priority: Medium     EMERGENCY CARE PLAN  Presenting Problem Signs and Symptoms Treatment Plan    Questions or conerns during clinic hours    I will call the clinic directly     Questions or conerns outside clinic hours    I will call the 24 hour nurse line at [phone]    Patient needs to schedule an appointment    I will call the 24 hour scheduling team at [phone] or clinic directly    Same day treatment     I will call the clinic first, nurse line if after hours, urgent care and express care if needed                                 CARDIOVASCULAR SCREENING; LDL GOAL LESS THAN 130 10/31/2010     Priority: Medium     Erectile dysfunction 01/29/2008     Priority: Medium     September 23, 2018 no known CAD, no contraindications to sildenafil.        Gouty arthropathy 12/31/2006     Priority: Medium     Problem list name updated by automated process. Provider to review and confirm  Imo Update utility       Hypertriglyceridemia 08/03/2005     Priority: Medium     Last Exam: December 19, 2007  Last Lipids: CHOL      211   01/25/2007 LDL      104   01/25/2007 HDL        83   01/25/2007  Last LFTs:: AST      106   01/25/2007   ALT       53   01/25/2007    TRIG      120   01/25/2007  TRIG      115   01/16/2006  Meds: Lopid 600 bid - tolerating          Past Surgical History     Past Surgical History:   Procedure Laterality Date     ANKLE SURGERY Left      COLONOSCOPY N/A 3/31/2016    Procedure: COLONOSCOPY;  Surgeon: Rhys Uriostegui MD;  Location: [facility]     ESOPHAGOSCOPY, GASTROSCOPY, DUODENOSCOPY (EGD), COMBINED N/A 3/31/2016    Procedure: COMBINED ESOPHAGOSCOPY, GASTROSCOPY, DUODENOSCOPY (EGD);  Surgeon: Rhys Uriostegui MD;  Location: [facility]     ESOPHAGOSCOPY, GASTROSCOPY, DUODENOSCOPY (EGD), COMBINED N/A 3/9/2018    Procedure: COMBINED ESOPHAGOSCOPY, GASTROSCOPY, DUODENOSCOPY (EGD), BIOPSY SINGLE OR MULTIPLE;  EGD;  Surgeon: Gonzalo Wahl MD;  Location: [facility]     ESOPHAGOSCOPY, GASTROSCOPY, DUODENOSCOPY (EGD), COMBINED N/A 6/7/2019    Procedure: ESOPHAGOGASTRODUODENOSCOPY (EGD);  Surgeon: Gonzalo Wahl MD;  Location: [facility]     IR PARACENTESIS  10/29/2019     IR TRANSVEN INTRAHEPATIC PORTOSYST REV  10/29/2019     KNEE SURGERY Left      KNEE SURGERY Right      SIGMOIDOSCOPY FLEXIBLE N/A 10/31/2017    Procedure: SIGMOIDOSCOPY FLEXIBLE;;  Surgeon: Armaan Adams MD;  Location: [facility]     TIPS Procedure  06/06/2018        History of Present Illness   Ivan Bell is a 54 year old male with hx etoh cirrhosis who presents because of hypokalemia. This was a routine lab done on clinic and the patient feels well.  He does note that recently his lactulose dose was increased and he has had more diarhhea.  Otherwise he has no c/o  Prior to Admission Medications   Prior to Admission Medications   Prescriptions Last Dose Informant Patient Reported? Taking?   CONSTULOSE 10 GM/15ML solution 12/31/2019 at am Spouse/Significant Other No Yes   Sig: TAKE 30MLS BY MOUTH THREE TIMES DAILY AS NEEDED FOR CONSTIPATION (TAKE AS NEEDED TO MAINTAIN 3 TO 4 BOWEL  MOVEMENTS DAILY)   Patient taking differently: 30 g in am and 15 g in pm   MULTIPLE VITAMINS PO 12/31/2019 at am Spouse/Significant Other Yes Yes   Sig: Take 1 tablet by mouth daily   Menaquinone-7 (VITAMIN K2) 100 MCG CAPS 12/31/2019 at am Spouse/Significant Other Yes Yes   Sig: Take 200 mcg by mouth daily    Thiamine HCl (B-1) 100 MG TABS 12/31/2019 at am Spouse/Significant Other No Yes   Sig: Take 1 tablet by mouth daily   furosemide (LASIX) 20 MG tablet 12/31/2019 at am Spouse/Significant Other Yes Yes   Sig: Take 20 mg by mouth daily    potassium chloride ER (K-DUR/KLOR-CON M) 20 MEQ CR tablet 12/31/2019 at am Spouse/Significant Other Yes No   Sig: Take 20 mEq by mouth daily   rifaximin (XIFAXAN) 550 MG TABS tablet 12/31/2019 at am Spouse/Significant Other No Yes   Sig: Take 1 tablet (550 mg) by mouth 2 times daily   sertraline (ZOLOFT) 50 MG tablet 12/30/2019 at pm Spouse/Significant Other No Yes   Sig: Take 0.5 tablets (25 mg) by mouth daily   sodium bicarbonate 650 MG tablet 12/31/2019 at am Spouse/Significant Other No Yes   Sig: Take 1 tablet (650 mg) by mouth 2 times daily   vitamin D3 (CHOLECALCIFEROL) 2000 units (50 mcg) tablet 12/31/2019 at am Spouse/Significant Other Yes Yes   Sig: Take 1 tablet by mouth daily      Facility-Administered Medications: None     Allergies   Allergies   Allergen Reactions     Prednisone Visual Disturbance     Trazodone Visual Disturbance     Benadryl [Diphenhydramine] Other (See Comments)     Delirium (visual and auditory hallucinations)     Oxycodone Other (See Comments)     Delirium and constipation       Family History    Family History   Problem Relation Age of Onset     Family History Negative Mother      Family History Negative Father      Hypertension Father      Cerebrovascular Disease Father 87     Breast Cancer Maternal Grandmother      Rheumatoid Arthritis Daughter      Depression Daughter      Cancer - colorectal No family hx of      Prostate Cancer No family hx  of      Liver Disease No family hx of        Social History   Social History     Socioeconomic History     Marital status:      Spouse name: Not on file     Number of children: Not on file     Years of education: Not on file     Highest education level: Not on file   Occupational History     Not on file   Social Needs     Financial resource strain: Not on file     Food insecurity:     Worry: Not on file     Inability: Not on file     Transportation needs:     Medical: Not on file     Non-medical: Not on file   Tobacco Use     Smoking status: Former Smoker     Packs/day: 0.00     Types: Dip, chew, snus or snuff     Last attempt to quit: 1998     Years since quittin.3     Smokeless tobacco: Current User     Last attempt to quit: 10/24/2017     Tobacco comment: 1 tin per 10 days.   Substance and Sexual Activity     Alcohol use: No     Alcohol/week: 17.5 standard drinks     Types: 21 Cans of beer per week     Comment: last etoh 14, did have Odouls ~2017     Drug use: No     Sexual activity: Not on file   Lifestyle     Physical activity:     Days per week: Not on file     Minutes per session: Not on file     Stress: Not on file   Relationships     Social connections:     Talks on phone: Not on file     Gets together: Not on file     Attends Christian service: Not on file     Active member of club or organization: Not on file     Attends meetings of clubs or organizations: Not on file     Relationship status: Not on file     Intimate partner violence:     Fear of current or ex partner: Not on file     Emotionally abused: Not on file     Physically abused: Not on file     Forced sexual activity: Not on file   Other Topics Concern     Parent/sibling w/ CABG, MI or angioplasty before 65F 55M? No   Social History Narrative     Not on file       Review of Systems    10 point ros neg except for HPI    Physical Exam   BP (P) 134/67   Pulse (P) 76   Temp 97.9  F (36.6  C) (Oral)   Resp (P) 18   Ht  "1.778 m (5' 10\")   Wt 82.5 kg (181 lb 14.1 oz)   SpO2 (P) 98%   BMI 26.10 kg/m       Weight: 181 lbs 14.07 oz Body mass index is 26.1 kg/m .     Constitutional: Alert, oriented, cooperative, no apparent distress, appears nontoxic  Eyes: Eyes are clear  HEENT:  No evidence of cranial trauma.  Lymph/Hematologic: No lymphadenopathy is appreciated.  Cardiovascular: Regular rate and rhythm,No lower extremity edema.  Respiratory: Clear to auscultation bilaterally.   GI: Soft, non-tender, normal bowel sounds  Genitourinary: Deferred  Musculoskeletal: Normal muscle bulk and tone.  Skin: Warm and dry, no rashes.   Neurologic: Neck supple. Cranial nerves are grossly intact.     Data   Data reviewed today:   Recent Labs   Lab 01/01/20  0914 01/01/20  0502 12/31/19  1839 12/31/19  1612 12/31/19  1516 12/31/19  1125   WBC  --  3.3*  --   --  5.9 5.5   HGB 9.4* 8.7*  --   --  9.7* 9.8*   MCV  --  90  --   --  90 90   PLT  --  58*  --   --  83* 69*   INR  --   --   --   --  2.16* 2.11*   NA  --  149*  --  146*  --  142   POTASSIUM 3.3* 3.2* 2.9* 2.6*  --  2.5*   CHLORIDE  --  122*  --  118*  --  115*   CO2  --  21  --  22  --  20   BUN  --  8  --  8  --  9   CR  --  1.21  --  1.26*  --  1.28*   ANIONGAP  --  6  --  6  --  7   JULISSA  --  7.6*  --  7.8*  --  8.1*   GLC  --  77  --  140*  --  108*   ALBUMIN  --   --   --  2.0*  --  2.2*   PROTTOTAL  --   --   --  4.8*  --  5.5*   BILITOTAL  --   --   --  3.5*  --  3.9*   ALKPHOS  --   --   --  120  --  149   ALT  --   --   --  18  --  19   AST  --   --   --  33  --  33         ekg- nonspecific diffuse st changes    Rianna Garcia MD     "

## 2020-01-01 NOTE — ED PROVIDER NOTES
History     Chief Complaint   Patient presents with     Abnormal Labs     seen in clinic, due to have recheck of labs. Potassium low, Jaundice.      Jaundice     HPI  Ivan Bell is a 54 year old male who presented with a history of alcohol related cirrhosis sober for years with periodic ascites and status post TIPS in 2018 with revision in the fall 2019, on chronic lactulose with secondary diarrhea and Lasix with increased dosing in the last week who underwent routine chemistry panel today and was referred to the emergency department for hypokalemia.  He has felt recent fatigue.  Has no associated palpitations or focal weakness.  He has had prior hypokalemia on several other occasions recently with potassium down to 3.0 and 3.2 range.    He has otherwise been feeling well without significant increased abdominal distention, fever or significant respiratory symptoms.  Review of systems is otherwise negative.  Triage nursing  mentioned possible jaundice although his baseline bilirubin is less than 5.    Allergies:  Allergies   Allergen Reactions     Prednisone Visual Disturbance     Trazodone Visual Disturbance     Benadryl [Diphenhydramine] Other (See Comments)     Delirium (visual and auditory hallucinations)     Oxycodone Other (See Comments)     Delirium and constipation       Problem List:    Patient Active Problem List    Diagnosis Date Noted     Hypokalemia 12/31/2019     Priority: Medium     Decompensation of cirrhosis of liver (H) 03/10/2019     Priority: Medium     Calculus of gallbladder without cholecystitis 09/23/2018     Priority: Medium     September 23, 2018 non-obstructing, incidental finding on us.        Nephrolithiasis 09/23/2018     Priority: Medium     September 23, 2018 non-obstructing, incident finding on us.        Alcoholic cirrhosis of liver with ascites (H) 03/08/2016     Priority: Medium     September 23, 2018 now sober, ascites resolved, S/P TIP procedure 6/2018. Normal liver enzymes,  diminished liver function.  INR 1.6, bilirubin 2.5, albumin 2.6. He  appears healthy. Doing well. Stressed the importance of abstaining from alcohol. See copy of recent us.     9/10/18:  Impression:   1. Patent TIPS with appropriate in stent velocities. Normal Doppler  evaluation of the remainder of the hepatic vasculature in the setting  of TIPS.  2. Cirrhotic hepatic morphology with evidence of portal hypertension,  including splenomegaly. No focal hepatic mass.  3. Cholelithiasis without evidence of cholecystitis.  4. Bilateral nonobstructing nephrolithiasis.            Hypertension goal BP (blood pressure) < 140/90 12/18/2012     Priority: Medium     24 hour contact given to patient  01/02/2012     Priority: Medium     EMERGENCY CARE PLAN  Presenting Problem Signs and Symptoms Treatment Plan    Questions or conerns during clinic hours    I will call the clinic directly     Questions or conerns outside clinic hours    I will call the 24 hour nurse line at [phone]    Patient needs to schedule an appointment    I will call the 24 hour scheduling team at [phone] or clinic directly    Same day treatment     I will call the clinic first, nurse line if after hours, urgent care and express care if needed                                 CARDIOVASCULAR SCREENING; LDL GOAL LESS THAN 130 10/31/2010     Priority: Medium     Erectile dysfunction 01/29/2008     Priority: Medium     September 23, 2018 no known CAD, no contraindications to sildenafil.        Gouty arthropathy 12/31/2006     Priority: Medium     Problem list name updated by automated process. Provider to review and confirm  Imo Update utility       Hypertriglyceridemia 08/03/2005     Priority: Medium     Last Exam: December 19, 2007  Last Lipids: CHOL      211   01/25/2007 LDL      104   01/25/2007 HDL       83   01/25/2007  Last LFTs:: AST      106   01/25/2007   ALT       53   01/25/2007    TRIG      120   01/25/2007  TRIG      115   01/16/2006  Meds:  Lopid 600 bid - tolerating          Past Medical History:    Past Medical History:   Diagnosis Date     Alcoholic cirrhosis (H)      Alcoholic hepatitis 03/2019     Hypertension      Left calcaneus fracture 1/9/2006     Portal vein thrombosis        Past Surgical History:    Past Surgical History:   Procedure Laterality Date     ANKLE SURGERY Left      COLONOSCOPY N/A 3/31/2016    Procedure: COLONOSCOPY;  Surgeon: Rhys Uriostegui MD;  Location: [facility]     ESOPHAGOSCOPY, GASTROSCOPY, DUODENOSCOPY (EGD), COMBINED N/A 3/31/2016    Procedure: COMBINED ESOPHAGOSCOPY, GASTROSCOPY, DUODENOSCOPY (EGD);  Surgeon: Rhsy Uriostegui MD;  Location: [facility]     ESOPHAGOSCOPY, GASTROSCOPY, DUODENOSCOPY (EGD), COMBINED N/A 3/9/2018    Procedure: COMBINED ESOPHAGOSCOPY, GASTROSCOPY, DUODENOSCOPY (EGD), BIOPSY SINGLE OR MULTIPLE;  EGD;  Surgeon: Gonzalo Wahl MD;  Location: [facility]     ESOPHAGOSCOPY, GASTROSCOPY, DUODENOSCOPY (EGD), COMBINED N/A 6/7/2019    Procedure: ESOPHAGOGASTRODUODENOSCOPY (EGD);  Surgeon: Gonzalo Wahl MD;  Location: [facility]     IR PARACENTESIS  10/29/2019     IR TRANSVEN INTRAHEPATIC PORTOSYST REV  10/29/2019     KNEE SURGERY Left      KNEE SURGERY Right      SIGMOIDOSCOPY FLEXIBLE N/A 10/31/2017    Procedure: SIGMOIDOSCOPY FLEXIBLE;;  Surgeon: Armaan Adams MD;  Location: [facility]     TIPS Procedure  06/06/2018       Family History:    Family History   Problem Relation Age of Onset     Family History Negative Mother      Family History Negative Father      Hypertension Father      Cerebrovascular Disease Father 87     Breast Cancer Maternal Grandmother      Rheumatoid Arthritis Daughter      Depression Daughter      Cancer - colorectal No family hx of      Prostate Cancer No family hx of      Liver Disease No family hx of        Social History:  Marital Status:   [2]  Social History     Tobacco Use     Smoking status: Former Smoker     Packs/day: 0.00     Types:  "Dip, chew, snus or snuff     Last attempt to quit: 1998     Years since quittin.3     Smokeless tobacco: Current User     Last attempt to quit: 10/24/2017     Tobacco comment: 1 tin per 10 days.   Substance Use Topics     Alcohol use: No     Alcohol/week: 17.5 standard drinks     Types: 21 Cans of beer per week     Comment: last etoh 14, did have Odouls ~2017     Drug use: No        Medications:    No current outpatient medications on file.        Review of Systems   Constitutional: Negative for chills, diaphoresis and fever.   HENT: Negative for ear pain, sinus pressure and sore throat.    Eyes: Negative for visual disturbance.   Respiratory: Negative for cough, shortness of breath and wheezing.    Cardiovascular: Negative for chest pain and palpitations.   Gastrointestinal: Positive for abdominal distention and diarrhea. Negative for abdominal pain, blood in stool, constipation, nausea and vomiting.   Genitourinary: Negative for dysuria, frequency and urgency.   Skin: Negative for rash.   Neurological: Negative for headaches.   All other systems reviewed and are negative.      Physical Exam   BP: (!) 159/72  Pulse: 91  Heart Rate: 78  Temp: 98.2  F (36.8  C)  Resp: 18  Height: 177.8 cm (5' 10\")  Weight: 83.8 kg (184 lb 12.8 oz)  SpO2: 99 %      Physical Exam  Constitutional:       General: He is not in acute distress.     Appearance: He is not toxic-appearing.   HENT:      Head: Atraumatic.      Nose: No rhinorrhea.      Mouth/Throat:      Mouth: Mucous membranes are moist.      Pharynx: Oropharynx is clear.   Eyes:      General: Scleral icterus (minimal) present.   Neck:      Musculoskeletal: Neck supple.   Cardiovascular:      Rate and Rhythm: Normal rate and regular rhythm.      Heart sounds: No murmur. No friction rub. No gallop.    Pulmonary:      Effort: Pulmonary effort is normal. No respiratory distress.      Breath sounds: Normal breath sounds. No stridor. No wheezing or rhonchi. "   Abdominal:      General: Abdomen is flat. Bowel sounds are normal. There is no distension.      Palpations: There is no mass.      Tenderness: There is no abdominal tenderness. There is no guarding.   Musculoskeletal:      Right lower leg: No edema.      Left lower leg: No edema.   Skin:     Coloration: Skin is not pale.      Findings: No rash.   Neurological:      General: No focal deficit present.      Mental Status: He is alert.      Motor: No weakness.              ED Course        Procedures                  EKG Interpretation:      Interpreted by Devin Diego MD  EKG done at 1522 hrs. demonstrates a sinus rhythm at 82 bpm with a leftward axis and no ST change but overall T wave flattening in most of the leads.  There is a poor R progression V1 through V3.  Possible Q waves inferiorly although likely R wave before that.  No ectopy.  Normal conduction.  Impression sinus rhythm at 82 bpm with T wave flattening and possibly a prior anterior septal MI.  Compared to 6/2019 EKG, the T waves appear to be more flat, but the R wave progression appeared to be better on the prior EKG..  However on comparison EKG when overlapping the EKGs compared with 2012 T waves were normal.    Critical Care time:  none               Results for orders placed or performed during the hospital encounter of 12/31/19 (from the past 24 hour(s))   CBC with platelets, differential   Result Value Ref Range    WBC 5.9 4.0 - 11.0 10e9/L    RBC Count 3.27 (L) 4.4 - 5.9 10e12/L    Hemoglobin 9.7 (L) 13.3 - 17.7 g/dL    Hematocrit 29.5 (L) 40.0 - 53.0 %    MCV 90 78 - 100 fl    MCH 29.7 26.5 - 33.0 pg    MCHC 32.9 31.5 - 36.5 g/dL    RDW 18.6 (H) 10.0 - 15.0 %    Platelet Count 83 (L) 150 - 450 10e9/L    Diff Method Automated Method     % Neutrophils 57.8 %    % Lymphocytes 18.7 %    % Monocytes 9.0 %    % Eosinophils 13.8 %    % Basophils 0.5 %    % Immature Granulocytes 0.2 %    Nucleated RBCs 0 0 /100    Absolute Neutrophil 3.4 1.6 - 8.3  10e9/L    Absolute Lymphocytes 1.1 0.8 - 5.3 10e9/L    Absolute Monocytes 0.5 0.0 - 1.3 10e9/L    Absolute Eosinophils 0.8 (H) 0.0 - 0.7 10e9/L    Absolute Basophils 0.0 0.0 - 0.2 10e9/L    Abs Immature Granulocytes 0.0 0 - 0.4 10e9/L    Absolute Nucleated RBC 0.0    INR   Result Value Ref Range    INR 2.16 (H) 0.86 - 1.14   Magnesium   Result Value Ref Range    Magnesium 1.8 1.6 - 2.3 mg/dL   Comprehensive metabolic panel   Result Value Ref Range    Sodium 146 (H) 133 - 144 mmol/L    Potassium 2.6 (LL) 3.4 - 5.3 mmol/L    Chloride 118 (H) 94 - 109 mmol/L    Carbon Dioxide 22 20 - 32 mmol/L    Anion Gap 6 3 - 14 mmol/L    Glucose 140 (H) 70 - 99 mg/dL    Urea Nitrogen 8 7 - 30 mg/dL    Creatinine 1.26 (H) 0.66 - 1.25 mg/dL    GFR Estimate 63 >60 mL/min/[1.73_m2]    GFR Estimate If Black 73 >60 mL/min/[1.73_m2]    Calcium 7.8 (L) 8.5 - 10.1 mg/dL    Bilirubin Total 3.5 (H) 0.2 - 1.3 mg/dL    Albumin 2.0 (L) 3.4 - 5.0 g/dL    Protein Total 4.8 (L) 6.8 - 8.8 g/dL    Alkaline Phosphatase 120 40 - 150 U/L    ALT 18 0 - 70 U/L    AST 33 0 - 45 U/L   Potassium   Result Value Ref Range    Potassium 2.9 (L) 3.4 - 5.3 mmol/L       Medications   potassium chloride 10 mEq in 100 mL intermittent infusion with 10 mg lidocaine (10 mEq Intravenous New Bag by Other Clinician 12/31/19 1730)   potassium chloride ER (K-DUR/KLOR-CON M) CR tablet 20-40 mEq (40 mEq Oral Given 12/31/19 1526)   potassium chloride (KLOR-CON) Packet 20-40 mEq (has no administration in time range)   influenza recomb quadrivalent PF (FLUBLOK) injection 0.5 mL (has no administration in time range)   magnesium sulfate 1 gm in 100mL D5W intermittent infusion (0 g Intravenous Stopped 12/31/19 1806)       Assessments & Plan (with Medical Decision Making)     MDM: Ivan Bell is a 54 year old male a history of alcoholic cirrhosis now sober for years and with use of Lasix and lactulose and likely secondary hypokalemia to this.  Had replacement of both magnesium and  potassium in the emergency department but the potassium is low as 2.5 cannot be replaced enough while still in the emergency department recommended that he remain overnight for replacement.  He does have EKG change with T wave flattening but no other changes on EKG.  Discussed the findings with both the patient and his wife.  I have reviewed the nursing notes.    I have reviewed the findings, diagnosis, plan and need for follow up with the patient.       Current Discharge Medication List          Final diagnoses:   Hypokalemia   Alcoholic cirrhosis of liver with ascites (H)       12/31/2019   [facility] INTENSIVE CARE     Devin Diego MD  12/31/19 2023

## 2020-01-01 NOTE — PROGRESS NOTES
VSS. Pt awake now, assisted to BR to void and stool without difficulties. AM labs drawn, await results.

## 2020-01-01 NOTE — PROGRESS NOTES
[facility] [place]    Patient admitted to room 1006 at approximately 1810 via cart from emergency room. Patient was accompanied by transport tech.     Verbal SBAR report received from DILIA Rangel prior to patient arrival.     Patient ambulated to bed independently. Patient alert and oriented X 3. The patient is not having any pain. 0-10 Pain Scale: 0. Admission vital signs: Blood pressure 131/67, pulse 76, temperature 98.2  F (36.8  C), temperature source Oral, resp. rate 19, weight 83.8 kg (184 lb 12.8 oz), SpO2 99 %. Patient was oriented to plan of care, call light, bed controls, tv, telephone, bathroom and visiting hours.     Risk Assessment    The following safety risks were identified during admission: none. Yellow risk band applied: NO.     Skin Initial Assessment    This writer admitted this patient and completed a full skin assessment and Tanmay score in the Adult PCS flowsheet. Appropriate interventions initiated as needed.     Secondary skin check completed by Edie SEGAL RN.         Education    Patient has a [place] to Observation order: Yes  Observation education completed and documented: Yes      Nichole C. Bushweiler, RN

## 2020-01-01 NOTE — PROGRESS NOTES
Pt verbalizing desire to discharge soon. States he feels good, potassium supplement given per protocol. MD updated.

## 2020-01-01 NOTE — DISCHARGE SUMMARY
[facility]    Discharge Summary  Hospital Medicine    Date of Admission:  12/31/2019  Date of Discharge:  1/1/2020   Discharging Provider: Rianna Garcia  Date of Service: 1/1/2020     Primary Care     [place]  [address]  [place] [address]     Please see H and P for details of hospital course     Discharge Disposition    home    Discharge Orders      Reason for your hospital stay    Hypokalemia ( low potassium)     Follow-up and recommended labs and tests     Need K and hgb lab 1/2 or 1/3.  Need appt with Primary MD within 1 week     Discharge Medications   Current Discharge Medication List      CONTINUE these medications which have CHANGED    Details   !! potassium chloride ER (K-DUR/KLOR-CON M) 20 MEQ CR tablet Take 1 tablet (20 mEq) by mouth 2 times daily  Qty: 60 tablet, Refills: 0    Associated Diagnoses: Hypokalemia      !! potassium chloride ER (K-DUR/KLOR-CON M) 20 MEQ CR tablet Take 2 tablets (40 mEq) by mouth once for 1 dose  Qty: 1 tablet, Refills: 0    Comments: Take this dose tonight and then start 2 tabs in the morning tomorrow  Associated Diagnoses: Hypokalemia       !! - Potential duplicate medications found. Please discuss with provider.      CONTINUE these medications which have NOT CHANGED    Details   CONSTULOSE 10 GM/15ML solution TAKE 30MLS BY MOUTH THREE TIMES DAILY AS NEEDED FOR CONSTIPATION (TAKE AS NEEDED TO MAINTAIN 3 TO 4 BOWEL MOVEMENTS DAILY)  Qty: 1000 mL, Refills: 11    Associated Diagnoses: Alcoholic cirrhosis of liver with ascites (H); Hepatic encephalopathy (H)      furosemide (LASIX) 20 MG tablet Take 20 mg by mouth daily   Refills: 2      Menaquinone-7 (VITAMIN K2) 100 MCG CAPS Take 200 mcg by mouth daily       MULTIPLE VITAMINS PO Take 1 tablet by mouth daily      rifaximin (XIFAXAN) 550 MG TABS tablet Take 1 tablet (550 mg) by mouth 2 times daily  Qty: 180 tablet, Refills: 0    Associated Diagnoses: Hepatic encephalopathy (H)       sertraline (ZOLOFT) 50 MG tablet Take 0.5 tablets (25 mg) by mouth daily  Qty: 30 tablet, Refills: 3    Associated Diagnoses: Alcoholic cirrhosis of liver with ascites (H); Itching      sodium bicarbonate 650 MG tablet Take 1 tablet (650 mg) by mouth 2 times daily  Qty: 180 tablet, Refills: 3    Associated Diagnoses: Renal tubular acidosis      Thiamine HCl (B-1) 100 MG TABS Take 1 tablet by mouth daily  Qty: 90 tablet, Refills: 3    Associated Diagnoses: Decompensation of cirrhosis of liver (H)      vitamin D3 (CHOLECALCIFEROL) 2000 units (50 mcg) tablet Take 1 tablet by mouth daily           Allergies   Allergies   Allergen Reactions     Prednisone Visual Disturbance     Trazodone Visual Disturbance     Benadryl [Diphenhydramine] Other (See Comments)     Delirium (visual and auditory hallucinations)     Oxycodone Other (See Comments)     Delirium and constipation         Rianna Garcia MD

## 2020-01-01 NOTE — PROGRESS NOTES
[facility] DISCHARGE NOTE    Patient discharged to home at 10:11 AM via ambulation. Accompanied by spouse and staff. Discharge instructions reviewed with patient and spouse, opportunity offered to ask questions. Prescriptions sent with patient to fill . All belongings sent with patient. Pt will make his own follow up appointment after the holidays and verbalized understanding about increased potassium dosing.     Nichole C. Bushweiler, RN

## 2020-01-03 ENCOUNTER — OFFICE VISIT (OUTPATIENT)
Dept: INFUSION THERAPY | Facility: CLINIC | Age: 55
End: 2020-01-03
Attending: PHYSICIAN ASSISTANT
Payer: COMMERCIAL

## 2020-01-03 ENCOUNTER — TELEPHONE (OUTPATIENT)
Dept: GASTROENTEROLOGY | Facility: CLINIC | Age: 55
End: 2020-01-03

## 2020-01-03 ENCOUNTER — ANCILLARY PROCEDURE (OUTPATIENT)
Dept: ULTRASOUND IMAGING | Facility: CLINIC | Age: 55
End: 2020-01-03
Attending: PHYSICIAN ASSISTANT
Payer: COMMERCIAL

## 2020-01-03 VITALS
TEMPERATURE: 97.8 F | HEART RATE: 82 BPM | SYSTOLIC BLOOD PRESSURE: 134 MMHG | WEIGHT: 170.7 LBS | RESPIRATION RATE: 16 BRPM | DIASTOLIC BLOOD PRESSURE: 72 MMHG | OXYGEN SATURATION: 99 % | BODY MASS INDEX: 24.49 KG/M2

## 2020-01-03 DIAGNOSIS — E87.6 HYPOKALEMIA: ICD-10-CM

## 2020-01-03 DIAGNOSIS — K70.31 ALCOHOLIC CIRRHOSIS OF LIVER WITH ASCITES (H): Primary | ICD-10-CM

## 2020-01-03 LAB
ANION GAP SERPL CALCULATED.3IONS-SCNC: 5 MMOL/L (ref 3–14)
BUN SERPL-MCNC: 7 MG/DL (ref 7–30)
CALCIUM SERPL-MCNC: 7.7 MG/DL (ref 8.5–10.1)
CHLORIDE SERPL-SCNC: 121 MMOL/L (ref 94–109)
CO2 SERPL-SCNC: 19 MMOL/L (ref 20–32)
CREAT SERPL-MCNC: 1.25 MG/DL (ref 0.66–1.25)
GFR SERPL CREATININE-BSD FRML MDRD: 64 ML/MIN/{1.73_M2}
GLUCOSE SERPL-MCNC: 142 MG/DL (ref 70–99)
POTASSIUM SERPL-SCNC: 3.1 MMOL/L (ref 3.4–5.3)
SODIUM SERPL-SCNC: 146 MMOL/L (ref 133–144)

## 2020-01-03 PROCEDURE — P9047 ALBUMIN (HUMAN), 25%, 50ML: HCPCS | Mod: ZF | Performed by: PHYSICIAN ASSISTANT

## 2020-01-03 PROCEDURE — 49083 ABD PARACENTESIS W/IMAGING: CPT

## 2020-01-03 PROCEDURE — 80048 BASIC METABOLIC PNL TOTAL CA: CPT | Performed by: INTERNAL MEDICINE

## 2020-01-03 PROCEDURE — 25000128 H RX IP 250 OP 636: Mod: ZF | Performed by: PHYSICIAN ASSISTANT

## 2020-01-03 PROCEDURE — 25000125 ZZHC RX 250: Mod: ZF | Performed by: PHYSICIAN ASSISTANT

## 2020-01-03 RX ORDER — ALBUMIN (HUMAN) 12.5 G/50ML
12.5 SOLUTION INTRAVENOUS 4 TIMES DAILY PRN
Status: CANCELLED
Start: 2020-01-03

## 2020-01-03 RX ORDER — POTASSIUM CHLORIDE 1500 MG/1
40 TABLET, EXTENDED RELEASE ORAL 2 TIMES DAILY
Qty: 120 TABLET | Refills: 3 | Status: SHIPPED | OUTPATIENT
Start: 2020-01-03 | End: 2020-01-09

## 2020-01-03 RX ORDER — ALBUMIN (HUMAN) 12.5 G/50ML
12.5 SOLUTION INTRAVENOUS 4 TIMES DAILY PRN
Status: DISCONTINUED | OUTPATIENT
Start: 2020-01-03 | End: 2020-01-03 | Stop reason: HOSPADM

## 2020-01-03 RX ADMIN — ALBUMIN HUMAN 12.5 G: 0.25 SOLUTION INTRAVENOUS at 12:39

## 2020-01-03 RX ADMIN — ALBUMIN HUMAN 12.5 G: 0.25 SOLUTION INTRAVENOUS at 13:05

## 2020-01-03 RX ADMIN — LIDOCAINE HYDROCHLORIDE 10 ML: 10 INJECTION, SOLUTION EPIDURAL; INFILTRATION; INTRACAUDAL; PERINEURAL at 12:39

## 2020-01-03 RX ADMIN — ALBUMIN HUMAN 12.5 G: 0.25 SOLUTION INTRAVENOUS at 12:54

## 2020-01-03 RX ADMIN — ALBUMIN HUMAN 12.5 G: 0.25 SOLUTION INTRAVENOUS at 13:15

## 2020-01-03 ASSESSMENT — PAIN SCALES - GENERAL: PAINLEVEL: NO PAIN (0)

## 2020-01-03 NOTE — TELEPHONE ENCOUNTER
Elicia stated that pt is having increased lower extremity edema and ascites since pt discharged from the ED on 12/31. Elicia stated that she spoke with Mary Goff on 12/31, RN care coordinator, regarding pt's increased stooling and labs were recommended. Pt had labs drawn and then was instructed to go to ED for  potassium of 2.5. Pt presented to the ED and was given IV potassium and magnesium replacements. Pt was discharged on potassium chloride 20 mEq twice daily and furosemide 20 mg daily. Per Elicia, pt was not taking any diuretics prior to ED visit. Elicia stated that pt denies shortness of breath but pt has labored breathing with talking. Elicia feels that pt needs a paracentesis and is wondering what other recommendations Dr. Bales has for pt's symptoms. Pt was able to be scheduled for paracentesis at [facility] at 12:30 pm and message sent to Dr. Bales reviewing pt's symptoms. Per Dr. Bales:    I think that the increased stool is causing the hypokalemia and hypernatremia which is causing worsening encephalopathy.       I would back off the lactulose.  I would hold the lasix for now as it will worsen his hypokalemia.  He should go on 40mEq KCL PO BID.  We should recheck labs next week.     Can we check serum PEth as the same time too?  He might be drinking again.     Lastly, can we get liver TIPS doppler U/S the same day as his appt with [facility]?     Thanks,     Bhavesh     Reviewed Dr. Bales's recommendations and new orders with Elicia. Elicia plans to have pt take lactulose 15 ml twice daily. Reiterated that the goal is for pt to have 3-5 stools per day and pt should be keeping track of number of stools. Instructed Elicia to have pt hold lasix and pt will need repeat labs next week. Pt will go to local [facility] to have labs drawn. Pt scheduled for TIPS ultrasound on 1/15 at 7:45 am. Plan to check in with pt in 3-4 days and look for labs results. Elicia verbalized understanding and is agreeable to plan.

## 2020-01-03 NOTE — TELEPHONE ENCOUNTER
Health Call Center    Phone Message    May a detailed message be left on voicemail: yes    Reason for Call: Symptoms or Concerns     If patient has red-flag symptoms, warm transfer to triage line    Current symptom or concern: Pt is getting swollen. Pt does have Ascites as well. Pt wife is wondering what they should be doing. Pt also just got out of ED. Please advise    Symptoms have been present for:  2 day(s)    Has patient previously been seen for this? No    Are there any new or worsening symptoms? Yes: Pt is getting swollen       Action Taken: Message routed to:  [facility] ([facility] Hepatology

## 2020-01-03 NOTE — PATIENT INSTRUCTIONS
Patient Education     Discharge Instructions for Paracentesis  Paracentesis is a procedure to remove extra fluid from your belly (abdomen). This fluid buildup in the abdomen is called ascites. The procedure may have been done to take a sample of the fluid. Or, it may have been done to drain the extra fluid from your abdomen and help make you more comfortable.     Ascites is buildup of excess fluid in the abdomen.   Home care    If you have pain after the procedure, your healthcare provider can prescribe or recommend pain medicines. Take these exactly as directed. If you stopped taking other medicines before the procedure, ask your provider when you can start them again.    Take it easy for 24 hours after the procedure. Avoid physical activity until your provider says it s OK.    You will have a small bandage over the puncture site. Stitches (sutures), surgical staples, adhesive tapes, adhesive strips, or surgical glue may be used to close the incision. They also help stop bleeding and speed healing. You may take the bandage off in 24 hours.    Check the puncture site for the signs of infection listed below.  Follow-up care  Make a follow-up appointment with your healthcare provider as directed. During your follow-up visit, your provider will check your healing. Let your provider know how you are feeling. You can also discuss the cause of your ascites and if you need any further treatment.  When to seek medical advice  Call your healthcare provider if you have any of the following after the procedure:    A fever of 100.4 F (38 C) or higher    Trouble breathing    Pain that doesn't go away even after taking pain medicine    Belly pain not caused by having the skin punctured    Bleeding from the puncture site    More than a small amount of fluid leaking from the puncture site    Swollen belly    Signs of infection at the puncture site. These include increased pain, redness, or swelling, warmth, or bad-smelling  drainage.    Blood in your urine    Feeling dizzy or lightheaded, or fainting   Date Last Reviewed: 7/1/2016 2000-2019 [employer]. [address] [place] [address], [place] [address]. All rights reserved. This information is not intended as a substitute for professional medical care. Always follow your healthcare professional's instructions.

## 2020-01-03 NOTE — NURSING NOTE
Chief Complaint   Patient presents with     Blood Draw     labs drawn with IV start by rn.  vital signs taken.     Labs drawn with IV start by RN in lab.  Vital signs taken.  Pt checked in to next appointment.    Luz Marina Snell RN

## 2020-01-03 NOTE — PROGRESS NOTES
Paracentesis Nursing Note  Ivan Bell presents today to [facility] for [facility].    During today's appointment orders from Celestino Verduzco were completed.    Progress Note:  Patient identification verified by name and date of birth.  Assessment completed.  Vitals monitored throughout appointment and recorded in Doc Flowsheets.  See proceduralist note in ultrasound.    Vascular Access: peripheral IV was placed prior to appointment at [facility] in lab  Labs: were drawn prior to appointment in lab.    Date of consent or authorization: 10/7/19.  Invasive Procedure Safety Checklist was completed and sent for scanning.     Paracentesis performed by Cristopher Colvin PA-C Radiology.    The following labs were communicated to provider performing paracentesis:  Lab Results   Component Value Date    PLT 58 01/01/2020       Total amount of ascites fluid drained: 7 liters.  Color of ascites fluid: yellow and clear.  Total amount of albumin given: 50  grams.    Patient tolerated procedure well.    Post procedure,denies pain or discomfort post paracentesis.      Discharge Plan:  Discharge instructions were reviewed with patient.  Patient/Representative verbalized understanding and all questions were answered.   Discharged from [facility] in stable condition.    Laura Zamudio RN  Administrations This Visit     albumin human 25 % injection 12.5 g     Admin Date  01/03/2020 Action  New Bag Dose  12.5 g Route  Intravenous Administered By  Laura Zamudio RN           Admin Date  01/03/2020 Action  New Bag Dose  12.5 g Route  Intravenous Administered By  Laura Zamudio RN           Admin Date  01/03/2020 Action  New Bag Dose  12.5 g Route  Intravenous Administered By  Laura Zamudio RN           Admin Date  01/03/2020 Action  New Bag Dose  12.5 g Route  Intravenous Administered By  Laura Zamudio RN          lidocaine 1 % 20 mL     Admin  Date  01/03/2020 Action  Given by Other Clinician Dose  10 mL Route  Other Administered By  Laura Zamudio, DILIA                    /72 (BP Location: Right arm, Patient Position: Sitting, Cuff Size: Adult Regular)   Pulse 82   Temp 97.9  F (36.6  C) (Oral)   Resp 16   Wt 85 kg (187 lb 4.8 oz)   SpO2 100%   BMI 26.87 kg/m

## 2020-01-07 DIAGNOSIS — K70.31 ALCOHOLIC CIRRHOSIS OF LIVER WITH ASCITES (H): ICD-10-CM

## 2020-01-07 LAB
ANION GAP SERPL CALCULATED.3IONS-SCNC: 3 MMOL/L (ref 3–14)
BUN SERPL-MCNC: 7 MG/DL (ref 7–30)
CALCIUM SERPL-MCNC: 8.3 MG/DL (ref 8.5–10.1)
CHLORIDE SERPL-SCNC: 116 MMOL/L (ref 94–109)
CO2 SERPL-SCNC: 23 MMOL/L (ref 20–32)
CREAT SERPL-MCNC: 1.2 MG/DL (ref 0.66–1.25)
GFR SERPL CREATININE-BSD FRML MDRD: 67 ML/MIN/{1.73_M2}
GLUCOSE SERPL-MCNC: 141 MG/DL (ref 70–99)
POTASSIUM SERPL-SCNC: 3.9 MMOL/L (ref 3.4–5.3)
SODIUM SERPL-SCNC: 142 MMOL/L (ref 133–144)

## 2020-01-07 PROCEDURE — 36415 COLL VENOUS BLD VENIPUNCTURE: CPT | Performed by: PHYSICIAN ASSISTANT

## 2020-01-07 PROCEDURE — 99000 SPECIMEN HANDLING OFFICE-LAB: CPT | Performed by: PHYSICIAN ASSISTANT

## 2020-01-07 PROCEDURE — 80048 BASIC METABOLIC PNL TOTAL CA: CPT | Performed by: PHYSICIAN ASSISTANT

## 2020-01-07 PROCEDURE — 80321 ALCOHOLS BIOMARKERS 1OR 2: CPT | Mod: 90 | Performed by: PHYSICIAN ASSISTANT

## 2020-01-07 NOTE — TELEPHONE ENCOUNTER
Called Elicia to check in on pt's symptoms since change in plan of care. Elicia stated that pt's diarrhea has subsided since decrease in lactulose to 15 ml twice daily. Elicia has not noticed any confusion with dose decrease. Pt had paracentesis on 1/3 and 7 liters of ascites was removed. Elicia denied increase in lower extremity swelling with stopping lasix. Pt had labs drawn today, results pending. Will discuss lab results with Dr. Bales and notify Elicia and pt of any additional orders/recommendations.

## 2020-01-09 RX ORDER — SPIRONOLACTONE 25 MG/1
75 TABLET ORAL DAILY
Qty: 90 TABLET | Refills: 0 | Status: SHIPPED | OUTPATIENT
Start: 2020-01-09 | End: 2020-01-31

## 2020-01-09 RX ORDER — POTASSIUM CHLORIDE 1500 MG/1
40 TABLET, EXTENDED RELEASE ORAL DAILY
Qty: 120 TABLET | Refills: 3 | COMMUNITY
Start: 2020-01-09 | End: 2020-08-24

## 2020-01-09 RX ORDER — FUROSEMIDE 20 MG
40 TABLET ORAL DAILY
Qty: 60 TABLET | Refills: 3 | Status: SHIPPED | OUTPATIENT
Start: 2020-01-09 | End: 2020-02-06

## 2020-01-09 NOTE — TELEPHONE ENCOUNTER
Received the following message from Celestino Verduzco PA-C:    Hi, please call patient/wife.   - Decrease potassium to 40 mEq once daily.   - Start 75 mg spironolactone   - Start 40 mg Lasix   - Repeat BMP in one week.     Thanks, Celestino     Notified Elicia of Celestino's recommendations after reviewing pt's labs from 1/7/2020. New prescriptions for spironolactone 75 mg daily and furosemide 40 mg daily sent to pt's preferred pharmacy. Pt will have BMP rechecked at clinic appointment on 1/15. Advised Elicia to have pt continue taking lactulose 15 ml twice daily. Encouraged pt and/or Leicia to call with any questions/concerns. Elicia verbalized understanding and is agreeable to plan.

## 2020-01-10 LAB — PETH BLD-MCNC: NEGATIVE NG/ML

## 2020-01-14 ENCOUNTER — PATIENT OUTREACH (OUTPATIENT)
Dept: GASTROENTEROLOGY | Facility: CLINIC | Age: 55
End: 2020-01-14

## 2020-01-14 NOTE — TELEPHONE ENCOUNTER
Attempted to reach patient/spouse for check in, no answer, unable to leave message at either number, will try again later.  Reached Elicia and [facility], but can proceed with labs and ultrasound as scheduled if they would like, and she agreed to keep these appointments. She states he does still complain of abdominal cramping at times, though he reports diarrhea has resolved. Elicia states patient is eating better, and has lost about 3 lbs since starting diuretics, 176 down to 173 lbs. Will have MD review lab and US results and call patient/Elicia to discuss plan. Elicia verbalized understanding and is agreeable to this plan.

## 2020-01-15 ENCOUNTER — ANCILLARY PROCEDURE (OUTPATIENT)
Dept: ULTRASOUND IMAGING | Facility: CLINIC | Age: 55
End: 2020-01-15
Attending: RADIOLOGY
Payer: COMMERCIAL

## 2020-01-15 DIAGNOSIS — K70.31 ALCOHOLIC CIRRHOSIS OF LIVER WITH ASCITES (H): ICD-10-CM

## 2020-01-15 DIAGNOSIS — N17.9 ACUTE KIDNEY INJURY (H): ICD-10-CM

## 2020-01-15 DIAGNOSIS — N20.0 NEPHROLITHIASIS: ICD-10-CM

## 2020-01-15 LAB
ALBUMIN SERPL-MCNC: 2.4 G/DL (ref 3.4–5)
ALP SERPL-CCNC: 144 U/L (ref 40–150)
ALT SERPL W P-5'-P-CCNC: 19 U/L (ref 0–70)
ANION GAP SERPL CALCULATED.3IONS-SCNC: 7 MMOL/L (ref 3–14)
AST SERPL W P-5'-P-CCNC: 34 U/L (ref 0–45)
BILIRUB DIRECT SERPL-MCNC: 1.3 MG/DL (ref 0–0.2)
BILIRUB SERPL-MCNC: 2.7 MG/DL (ref 0.2–1.3)
BUN SERPL-MCNC: 7 MG/DL (ref 7–30)
CALCIUM SERPL-MCNC: 7.9 MG/DL (ref 8.5–10.1)
CHLORIDE SERPL-SCNC: 117 MMOL/L (ref 94–109)
CO2 SERPL-SCNC: 22 MMOL/L (ref 20–32)
CREAT SERPL-MCNC: 1.22 MG/DL (ref 0.66–1.25)
CREAT UR-MCNC: 153 MG/DL
ERYTHROCYTE [DISTWIDTH] IN BLOOD BY AUTOMATED COUNT: 19 % (ref 10–15)
GFR SERPL CREATININE-BSD FRML MDRD: 66 ML/MIN/{1.73_M2}
GLUCOSE SERPL-MCNC: 83 MG/DL (ref 70–99)
HCT VFR BLD AUTO: 33.4 % (ref 40–53)
HGB BLD-MCNC: 10.4 G/DL (ref 13.3–17.7)
INR PPP: 2.16 (ref 0.86–1.14)
MCH RBC QN AUTO: 29.2 PG (ref 26.5–33)
MCHC RBC AUTO-ENTMCNC: 31.1 G/DL (ref 31.5–36.5)
MCV RBC AUTO: 94 FL (ref 78–100)
PLATELET # BLD AUTO: 70 10E9/L (ref 150–450)
POTASSIUM SERPL-SCNC: 3.3 MMOL/L (ref 3.4–5.3)
PROT SERPL-MCNC: 5.3 G/DL (ref 6.8–8.8)
PROT UR-MCNC: 0.37 G/L
PROT/CREAT 24H UR: 0.24 G/G CR (ref 0–0.2)
RBC # BLD AUTO: 3.56 10E12/L (ref 4.4–5.9)
SODIUM SERPL-SCNC: 145 MMOL/L (ref 133–144)
WBC # BLD AUTO: 3.6 10E9/L (ref 4–11)

## 2020-01-16 RX ORDER — FUROSEMIDE 20 MG
20 TABLET ORAL DAILY
Refills: 2 | OUTPATIENT
Start: 2020-01-16

## 2020-01-21 ENCOUNTER — PATIENT OUTREACH (OUTPATIENT)
Dept: GASTROENTEROLOGY | Facility: CLINIC | Age: 55
End: 2020-01-21

## 2020-01-21 DIAGNOSIS — R19.7 DIARRHEA: Primary | ICD-10-CM

## 2020-01-21 DIAGNOSIS — K70.31 ALCOHOLIC CIRRHOSIS OF LIVER WITH ASCITES (H): ICD-10-CM

## 2020-01-21 NOTE — TELEPHONE ENCOUNTER
Patient called to report continued diarrhea despite changing eating habits and eating solids. He reports at minimum 4-5 liquid stools per day, often more. There are no solid parts of the stool, just liquid per his report. He has cut back lactulose to 15 ml twice per day. He also reports his weight has been stable at 170-172 lbs.   Per Dr. Bales: He needs to drink more fluid as he is dry, reduce his lactulose more, and check c.diff and stool cx.   He has been scheduled for follow up with Celestino DURAN on 2/6/20, followed by paracentesis. Patient will decrease lactulose to 1 dose in AM, and will adjust as needed to maintain stools. Patient verbalized understanding of the plan, and will  stool kit at [facility] tomorrow AM.

## 2020-01-23 ENCOUNTER — ANCILLARY PROCEDURE (OUTPATIENT)
Dept: ULTRASOUND IMAGING | Facility: CLINIC | Age: 55
End: 2020-01-23
Attending: PHYSICIAN ASSISTANT
Payer: COMMERCIAL

## 2020-01-23 ENCOUNTER — APPOINTMENT (OUTPATIENT)
Dept: LAB | Facility: CLINIC | Age: 55
End: 2020-01-23
Payer: COMMERCIAL

## 2020-01-23 ENCOUNTER — OFFICE VISIT (OUTPATIENT)
Dept: INFUSION THERAPY | Facility: CLINIC | Age: 55
End: 2020-01-23
Attending: PHYSICIAN ASSISTANT
Payer: COMMERCIAL

## 2020-01-23 VITALS
BODY MASS INDEX: 23.96 KG/M2 | WEIGHT: 167 LBS | HEART RATE: 80 BPM | OXYGEN SATURATION: 97 % | TEMPERATURE: 98 F | DIASTOLIC BLOOD PRESSURE: 70 MMHG | SYSTOLIC BLOOD PRESSURE: 124 MMHG

## 2020-01-23 DIAGNOSIS — K70.31 ALCOHOLIC CIRRHOSIS OF LIVER WITH ASCITES (H): Primary | ICD-10-CM

## 2020-01-23 PROCEDURE — 25000125 ZZHC RX 250: Mod: ZF | Performed by: PHYSICIAN ASSISTANT

## 2020-01-23 PROCEDURE — 25000128 H RX IP 250 OP 636: Mod: ZF | Performed by: PHYSICIAN ASSISTANT

## 2020-01-23 PROCEDURE — P9047 ALBUMIN (HUMAN), 25%, 50ML: HCPCS | Mod: ZF | Performed by: PHYSICIAN ASSISTANT

## 2020-01-23 PROCEDURE — 27210190 US PARACENTESIS

## 2020-01-23 RX ORDER — ALBUMIN (HUMAN) 12.5 G/50ML
12.5 SOLUTION INTRAVENOUS 4 TIMES DAILY PRN
Status: DISCONTINUED | OUTPATIENT
Start: 2020-01-23 | End: 2020-01-23 | Stop reason: HOSPADM

## 2020-01-23 RX ORDER — ALBUMIN (HUMAN) 12.5 G/50ML
12.5 SOLUTION INTRAVENOUS 4 TIMES DAILY PRN
Status: CANCELLED
Start: 2020-01-23

## 2020-01-23 RX ADMIN — ALBUMIN HUMAN 12.5 G: 0.25 SOLUTION INTRAVENOUS at 12:27

## 2020-01-23 RX ADMIN — ALBUMIN HUMAN 12.5 G: 0.25 SOLUTION INTRAVENOUS at 12:44

## 2020-01-23 RX ADMIN — LIDOCAINE HYDROCHLORIDE 10 ML: 10 INJECTION, SOLUTION EPIDURAL; INFILTRATION; INTRACAUDAL; PERINEURAL at 12:14

## 2020-01-23 RX ADMIN — ALBUMIN HUMAN 12.5 G: 0.25 SOLUTION INTRAVENOUS at 12:19

## 2020-01-23 RX ADMIN — ALBUMIN HUMAN 12.5 G: 0.25 SOLUTION INTRAVENOUS at 12:35

## 2020-01-23 NOTE — PROGRESS NOTES
Paracentesis Nursing Note  Ivan Bell presents today to [facility] for a paracentesis.    During today's appointment orders from Celestino Verduzco PA-C  were completed.    Progress Note:  Patient identification verified by name and date of birth.  Assessment completed.  Vitals monitored throughout appointment and recorded in Doc Flowsheets.  See proceduralist note in ultrasound.    Vascular Access: peripheral IV placed today.  Labs: were not ordered for this appointment.    Date of consent or authorization: 1/23/2020.  Invasive Procedure Safety Checklist was completed and sent for scanning.     Paracentesis performed by Cristopher Colvin PA-C Radiology.    The following labs were communicated to provider performing paracentesis:  Lab Results   Component Value Date    PLT 70 01/15/2020       Total amount of ascites fluid drained: 6.1 liters.  Color of ascites fluid: clear, yellow.  Total amount of albumin given: 50  grams.    Patient tolerated procedure well.    Post procedure,denies pain or discomfort post paracentesis.      Discharge Plan:  Discharge instructions were reviewed with patient.  Patient/Representative verbalized understanding and all questions were answered.   Discharged from [facility] in stable condition.    Kasey Min RN       Administrations This Visit     albumin human 25 % injection 12.5 g     Admin Date  01/23/2020 Action  New Bag Dose  12.5 g Rate  300 mL/hr Route  Intravenous Administered By  Kasey Min RN           Admin Date  01/23/2020 Action  New Bag Dose  12.5 g Rate  400 mL/hr Route  Intravenous Administered By  Kasey Min RN           Admin Date  01/23/2020 Action  New Bag Dose  12.5 g Rate   Route  Intravenous Administered By  Kasey Min RN           Admin Date  01/23/2020 Action  New Bag Dose  12.5 g Rate   Route  Intravenous Administered By  Kasey Min RN          lidocaine 1 % 20 mL     Admin Date  01/23/2020  Action  Given by Other Clinician Dose  10 mL Route  Other Administered By  Kasey Min, RN                Temp 98  F (36.7  C)   Wt 82 kg (180 lb 12.8 oz)   SpO2 97%   BMI 25.94 kg/m

## 2020-01-23 NOTE — PATIENT INSTRUCTIONS
Dear Ivan Bell    Thank you for choosing [facility] ([facility]) for your procedure.  The following information is a summary of our appointment as well as important reminders.      Patient Education     Discharge Instructions for Paracentesis  Paracentesis is a procedure to remove extra fluid from your belly (abdomen). This fluid buildup in the abdomen is called ascites. The procedure may have been done to take a sample of the fluid. Or, it may have been done to drain the extra fluid from your abdomen and help make you more comfortable.     Ascites is buildup of excess fluid in the abdomen.   Home care    If you have pain after the procedure, your healthcare provider can prescribe or recommend pain medicines. Take these exactly as directed. If you stopped taking other medicines before the procedure, ask your provider when you can start them again.    Take it easy for 24 hours after the procedure. Avoid physical activity until your provider says it s OK.    You will have a small bandage over the puncture site. Stitches (sutures), surgical staples, adhesive tapes, adhesive strips, or surgical glue may be used to close the incision. They also help stop bleeding and speed healing. You may take the bandage off in 24 hours.    Check the puncture site for the signs of infection listed below.  Follow-up care  Make a follow-up appointment with your healthcare provider as directed. During your follow-up visit, your provider will check your healing. Let your provider know how you are feeling. You can also discuss the cause of your ascites and if you need any further treatment.  When to seek medical advice  Call your healthcare provider if you have any of the following after the procedure:    A fever of 100.4 F (38 C) or higher    Trouble breathing    Pain that doesn't go away even after taking pain medicine    Belly pain not caused by having the skin  punctured    Bleeding from the puncture site    More than a small amount of fluid leaking from the puncture site    Swollen belly    Signs of infection at the puncture site. These include increased pain, redness, or swelling, warmth, or bad-smelling drainage.    Blood in your urine    Feeling dizzy or lightheaded, or fainting   Date Last Reviewed: 7/1/2016 2000-2019 [employer] [address] [place] [address]. All rights reserved. This information is not intended as a substitute for professional medical care. Always follow your healthcare professional's instructions.             We look forward in seeing you on your next appointment here at [facility] ([facility]).  Please don t hesitate to call us at [phone] to reschedule any of your appointments or to speak with one of the [facility] registered nurses.  It was a pleasure taking care of you today.    Sincerely,    [facility]  [facility]  [address] [place] [address]  [id number]  Phone:  [phone]

## 2020-01-24 NOTE — PROGRESS NOTES
Patient reports he has decreased the lactulose to once daily, but continues to have loose stools. He has not noted any increase in confusion or fogginess. He states he had paracentesis yesterday and 6 liters were removed. He also reports that his weight has decreasing despite the increase in ascites. Encouraged patient to get stool sample completed to evaluate continued stools.

## 2020-01-29 DIAGNOSIS — K70.31 ALCOHOLIC CIRRHOSIS OF LIVER WITH ASCITES (H): Primary | ICD-10-CM

## 2020-01-29 NOTE — PROGRESS NOTES
"Patient called to report that diarrhea has stopped he feels because he stopped eating fresh fruit in overabundance. He states he was eating a \"ton of fresh fruit\" everyday, and he cut way back on this, and diarrhea stopped. He states he is still having several soft formed stools each day and continues to take lactulose 1-2 times per day. He has not provided a stool sample for testing as ordered, and does not plan to now that diarrhea has stopped. Last para was 1/23 with 6100 ml removed, and he plans to see Celestino Verduzco PA-C on 2/6 as scheduled. Patient has no further questions or concerns at this time.  "

## 2020-01-31 ENCOUNTER — TELEPHONE (OUTPATIENT)
Dept: GASTROENTEROLOGY | Facility: CLINIC | Age: 55
End: 2020-01-31

## 2020-01-31 RX ORDER — SPIRONOLACTONE 25 MG/1
75 TABLET ORAL DAILY
Qty: 90 TABLET | Refills: 11 | Status: SHIPPED | OUTPATIENT
Start: 2020-01-31 | End: 2020-02-06

## 2020-01-31 NOTE — TELEPHONE ENCOUNTER
Patient contacted and reminded of upcoming appointment.  Patient confirmed they will be attending.  Patient instructed to bring updated medications list to appointment.    Eufemia Vick on 1/31/2020 at 3:02 PM

## 2020-02-06 ENCOUNTER — OFFICE VISIT (OUTPATIENT)
Dept: GASTROENTEROLOGY | Facility: CLINIC | Age: 55
End: 2020-02-06
Attending: PHYSICIAN ASSISTANT
Payer: COMMERCIAL

## 2020-02-06 ENCOUNTER — OFFICE VISIT (OUTPATIENT)
Dept: INFUSION THERAPY | Facility: CLINIC | Age: 55
End: 2020-02-06
Attending: PHYSICIAN ASSISTANT
Payer: COMMERCIAL

## 2020-02-06 ENCOUNTER — ANCILLARY PROCEDURE (OUTPATIENT)
Dept: ULTRASOUND IMAGING | Facility: CLINIC | Age: 55
End: 2020-02-06
Attending: PHYSICIAN ASSISTANT
Payer: COMMERCIAL

## 2020-02-06 VITALS
DIASTOLIC BLOOD PRESSURE: 73 MMHG | TEMPERATURE: 97.9 F | BODY MASS INDEX: 23.19 KG/M2 | SYSTOLIC BLOOD PRESSURE: 132 MMHG | OXYGEN SATURATION: 98 % | WEIGHT: 161.6 LBS

## 2020-02-06 VITALS
SYSTOLIC BLOOD PRESSURE: 131 MMHG | WEIGHT: 170.5 LBS | BODY MASS INDEX: 24.41 KG/M2 | RESPIRATION RATE: 20 BRPM | OXYGEN SATURATION: 100 % | HEIGHT: 70 IN | HEART RATE: 80 BPM | DIASTOLIC BLOOD PRESSURE: 69 MMHG | TEMPERATURE: 98.3 F

## 2020-02-06 DIAGNOSIS — K70.31 ALCOHOLIC CIRRHOSIS OF LIVER WITH ASCITES (H): ICD-10-CM

## 2020-02-06 DIAGNOSIS — K70.31 ALCOHOLIC CIRRHOSIS OF LIVER WITH ASCITES (H): Primary | ICD-10-CM

## 2020-02-06 LAB
ALBUMIN SERPL-MCNC: 2.5 G/DL (ref 3.4–5)
ALP SERPL-CCNC: 142 U/L (ref 40–150)
ALT SERPL W P-5'-P-CCNC: 19 U/L (ref 0–70)
ANION GAP SERPL CALCULATED.3IONS-SCNC: 5 MMOL/L (ref 3–14)
AST SERPL W P-5'-P-CCNC: 39 U/L (ref 0–45)
BILIRUB DIRECT SERPL-MCNC: 1.1 MG/DL (ref 0–0.2)
BILIRUB SERPL-MCNC: 2.5 MG/DL (ref 0.2–1.3)
BUN SERPL-MCNC: 13 MG/DL (ref 7–30)
CALCIUM SERPL-MCNC: 8.8 MG/DL (ref 8.5–10.1)
CHLORIDE SERPL-SCNC: 112 MMOL/L (ref 94–109)
CO2 SERPL-SCNC: 24 MMOL/L (ref 20–32)
CREAT SERPL-MCNC: 1.2 MG/DL (ref 0.66–1.25)
ERYTHROCYTE [DISTWIDTH] IN BLOOD BY AUTOMATED COUNT: 19.3 % (ref 10–15)
GFR SERPL CREATININE-BSD FRML MDRD: 67 ML/MIN/{1.73_M2}
GLUCOSE SERPL-MCNC: 74 MG/DL (ref 70–99)
HCT VFR BLD AUTO: 32.5 % (ref 40–53)
HGB BLD-MCNC: 10.3 G/DL (ref 13.3–17.7)
INR PPP: 1.86 (ref 0.86–1.14)
MCH RBC QN AUTO: 30.2 PG (ref 26.5–33)
MCHC RBC AUTO-ENTMCNC: 31.7 G/DL (ref 31.5–36.5)
MCV RBC AUTO: 95 FL (ref 78–100)
PLATELET # BLD AUTO: 63 10E9/L (ref 150–450)
POTASSIUM SERPL-SCNC: 3.9 MMOL/L (ref 3.4–5.3)
PROT SERPL-MCNC: 5.4 G/DL (ref 6.8–8.8)
RBC # BLD AUTO: 3.41 10E12/L (ref 4.4–5.9)
SODIUM SERPL-SCNC: 142 MMOL/L (ref 133–144)
WBC # BLD AUTO: 3.6 10E9/L (ref 4–11)

## 2020-02-06 PROCEDURE — P9047 ALBUMIN (HUMAN), 25%, 50ML: HCPCS | Mod: ZF | Performed by: PHYSICIAN ASSISTANT

## 2020-02-06 PROCEDURE — 25000125 ZZHC RX 250: Mod: ZF | Performed by: PHYSICIAN ASSISTANT

## 2020-02-06 PROCEDURE — G0463 HOSPITAL OUTPT CLINIC VISIT: HCPCS | Mod: ZF

## 2020-02-06 PROCEDURE — 80048 BASIC METABOLIC PNL TOTAL CA: CPT | Performed by: PHYSICIAN ASSISTANT

## 2020-02-06 PROCEDURE — 85610 PROTHROMBIN TIME: CPT | Performed by: PHYSICIAN ASSISTANT

## 2020-02-06 PROCEDURE — 49083 ABD PARACENTESIS W/IMAGING: CPT

## 2020-02-06 PROCEDURE — 25000128 H RX IP 250 OP 636: Mod: ZF | Performed by: PHYSICIAN ASSISTANT

## 2020-02-06 PROCEDURE — 80076 HEPATIC FUNCTION PANEL: CPT | Performed by: PHYSICIAN ASSISTANT

## 2020-02-06 PROCEDURE — 36415 COLL VENOUS BLD VENIPUNCTURE: CPT | Performed by: PHYSICIAN ASSISTANT

## 2020-02-06 PROCEDURE — 85027 COMPLETE CBC AUTOMATED: CPT | Performed by: PHYSICIAN ASSISTANT

## 2020-02-06 PROCEDURE — 40000556 ZZH STATISTIC PERIPHERAL IV START W US GUIDANCE: Mod: ZF

## 2020-02-06 RX ORDER — ALBUMIN (HUMAN) 12.5 G/50ML
12.5 SOLUTION INTRAVENOUS 4 TIMES DAILY PRN
Status: CANCELLED
Start: 2020-02-06

## 2020-02-06 RX ORDER — ALBUMIN (HUMAN) 12.5 G/50ML
12.5 SOLUTION INTRAVENOUS 4 TIMES DAILY PRN
Status: DISCONTINUED | OUTPATIENT
Start: 2020-02-06 | End: 2020-02-06 | Stop reason: HOSPADM

## 2020-02-06 RX ORDER — FUROSEMIDE 40 MG
80 TABLET ORAL DAILY
Qty: 60 TABLET | Refills: 1 | Status: SHIPPED | OUTPATIENT
Start: 2020-02-06 | End: 2020-04-10

## 2020-02-06 RX ORDER — SPIRONOLACTONE 50 MG/1
125 TABLET, FILM COATED ORAL DAILY
Qty: 60 TABLET | Refills: 1 | Status: SHIPPED | OUTPATIENT
Start: 2020-02-06 | End: 2020-04-10 | Stop reason: SINTOL

## 2020-02-06 RX ADMIN — ALBUMIN HUMAN 12.5 G: 0.25 SOLUTION INTRAVENOUS at 10:42

## 2020-02-06 RX ADMIN — ALBUMIN HUMAN 12.5 G: 0.25 SOLUTION INTRAVENOUS at 10:58

## 2020-02-06 RX ADMIN — ALBUMIN HUMAN 12.5 G: 0.25 SOLUTION INTRAVENOUS at 10:49

## 2020-02-06 RX ADMIN — ALBUMIN HUMAN 12.5 G: 0.25 SOLUTION INTRAVENOUS at 10:36

## 2020-02-06 RX ADMIN — LIDOCAINE HYDROCHLORIDE 10 ML: 10 INJECTION, SOLUTION EPIDURAL; INFILTRATION; INTRACAUDAL; PERINEURAL at 10:27

## 2020-02-06 ASSESSMENT — PAIN SCALES - GENERAL: PAINLEVEL: NO PAIN (0)

## 2020-02-06 ASSESSMENT — MIFFLIN-ST. JEOR: SCORE: 1609.63

## 2020-02-06 NOTE — LETTER
2/6/2020       RE: Ivan Bell  [address] [facility] [id number]     Dear Colleague,    Thank you for referring your patient, Ivan Bell, to the [facility] HEPATOLOGY at [facility]. Please see a copy of my visit note below.      Hepatology Follow-up Clinic note  Ivan Bell   Date of Birth 1965  Date of Service 2/11/2020    Reason for follow-up: ETOH cirrhosis         Assessment/plan:   Ivan Bell is a 54 year old male with ETOH cirrhosis complicated by paracentesis S/P TIPS and recent revision in Nov 2019, hepatic encephalopathy and portal hypertension gastropathy. He has been sober from alcohol since January 2019. He continues to have moderate liver dysfunction, MELD-Na 19. Patient will need to complete CD evaluation if he wishes to move forward with liver transplant evaluation given multiple . He is up to date with HCC screening and does not need variceal screening with patent TIPS.     # Ascites:   - Recent US abdomen noting patent TIPS  - Increase to 150 mg spironolactone  - Continue 80 mg lasix twice daily   - Repeat BMP in one week   # History of alcohol abuse:   - Continue absolute sobriety from ETOH. Will recommend completing CD evaluation in order to be a liver transplant candidate.   # Hepatic encephalopathy:   - Continue Rifaximin 550 mg twice daily   - Continue lactulose (dose to 3-5 bowel movements a day)  - Follow-up in clinic with Dr. Bales in 3 months     Celestino Verduzco PA-C   [facility] Hepatology clinic    -----------------------------------------------------       HPI:   Ivan Bell is a 54 year old male presenting for follow-up.     Cirrhosis  - ETOH  - hx ascites, TIPS placement 5/14/18, 6/6/18  - hx HE  - hx variceal bleed Oct 2017  - ETOH hepatitis March 2019  - last EGD Apr 2018- medium EV with bandingx4, mild portal hypertensive gastropathy  - HCC screening- US TIPPS on 1/15/2020     Patient was last by Dr. Bales on  10/7/2019. Patient is following up after recent ER visit for hypokalemia and diarrhea.     His lactulose dose has been adjusted. He also adjusted his diet and is not eating so much fruit during the day, which helped with his diarrhea. He is currently taking 15 mL of lactulose a day and having 3-4 bowel movements a day. He is also taking Rifaximin twice daily. No problems with confusion since recent ER visit.     He is still having large volume paracentesis post TIPs revision in November 2019. He has US Abdomen in mid-January that showed a patent TIPS. His last paracentesis was 7 L and was two weeks ago.     He is currently taking 40 mg Lasix once daily and 75 mg spironolactone as well as 40 mEq potassium.     He last drank alcohol one year ago. He has not gone through any support groups or classes. He is still working, removing snow.     Medical hx Surgical hx   Past Medical History:   Diagnosis Date     Alcoholic cirrhosis (H)      Alcoholic hepatitis 03/2019     Gout      Hypertension      Hypertriglyceridemia      Left calcaneus fracture 1/9/2006     Portal vein thrombosis     Past Surgical History:   Procedure Laterality Date     ANKLE SURGERY Left      COLONOSCOPY N/A 3/31/2016    Procedure: COLONOSCOPY;  Surgeon: Rhys Uriostegui MD;  Location:  GI     ESOPHAGOSCOPY, GASTROSCOPY, DUODENOSCOPY (EGD), COMBINED N/A 3/31/2016    Procedure: COMBINED ESOPHAGOSCOPY, GASTROSCOPY, DUODENOSCOPY (EGD);  Surgeon: Rhys Uriostegui MD;  Location:  GI     ESOPHAGOSCOPY, GASTROSCOPY, DUODENOSCOPY (EGD), COMBINED N/A 3/9/2018    Procedure: COMBINED ESOPHAGOSCOPY, GASTROSCOPY, DUODENOSCOPY (EGD), BIOPSY SINGLE OR MULTIPLE;  EGD;  Surgeon: Gonzalo Wahl MD;  Location:  GI     ESOPHAGOSCOPY, GASTROSCOPY, DUODENOSCOPY (EGD), COMBINED N/A 6/7/2019    Procedure: ESOPHAGOGASTRODUODENOSCOPY (EGD);  Surgeon: Gonzalo Wahl MD;  Location:  GI     IR PARACENTESIS  10/29/2019     IR  "TRANSVEN INTRAHEPATIC PORTOSYST REV  10/29/2019     KNEE SURGERY Left      KNEE SURGERY Right      SIGMOIDOSCOPY FLEXIBLE N/A 10/31/2017    Procedure: SIGMOIDOSCOPY FLEXIBLE;;  Surgeon: Armaan Adams MD;  Location: [facility] GI     TIPS Procedure  06/06/2018                 Medications:     Current Outpatient Medications   Medication     Ascorbic Acid (VITAMIN C PO)     CONSTULOSE 10 GM/15ML solution     furosemide (LASIX) 40 MG tablet     Menaquinone-7 (VITAMIN K2) 100 MCG CAPS     MULTIPLE VITAMINS PO     potassium chloride ER (K-DUR/KLOR-CON M) 20 MEQ CR tablet     rifaximin (XIFAXAN) 550 MG TABS tablet     sertraline (ZOLOFT) 50 MG tablet     sodium bicarbonate 650 MG tablet     spironolactone (ALDACTONE) 50 MG tablet     Thiamine HCl (B-1) 100 MG TABS     vitamin D3 (CHOLECALCIFEROL) 2000 units (50 mcg) tablet     No current facility-administered medications for this visit.             Allergies:     Allergies   Allergen Reactions     Prednisone Visual Disturbance     Trazodone Visual Disturbance     Benadryl [Diphenhydramine] Other (See Comments)     Delirium (visual and auditory hallucinations)     Oxycodone Other (See Comments)     Delirium and constipation            Review of Systems:   10 points ROS was obtained and highlighted in the HPI, otherwise negative.          Physical Exam:   VS:  /69 (BP Location: Right arm, Patient Position: Sitting, Cuff Size: Adult Regular)   Pulse 80   Temp 98.3  F (36.8  C) (Oral)   Resp 20   Ht 1.778 m (5' 10\")   Wt 77.3 kg (170 lb 8 oz)   SpO2 100%   BMI 24.46 kg/m         Gen: A&Ox3, NAD, thin  HEENT: non-icteric   CV: RRR, no overt murmurs  Lung: CTA Bilatererally, no wheezing or crackles.   Lym- no palpable lymphadenopathy  Abd: soft, NT, ND,  Moderate ascites   Ext: no edema, intact pulses.   Skin: No rash,  no palmar erythema, telangiectasias or jaundice  Neuro: grossly intact, no asterixis   Psych: appropriate mood and affects           Data:   Reviewed in " person and significant for:    Lab Results   Component Value Date     02/06/2020      Lab Results   Component Value Date    POTASSIUM 3.9 02/06/2020     Lab Results   Component Value Date    CHLORIDE 112 02/06/2020     Lab Results   Component Value Date    CO2 24 02/06/2020     Lab Results   Component Value Date    BUN 13 02/06/2020     Lab Results   Component Value Date    CR 1.20 02/06/2020       Lab Results   Component Value Date    WBC 3.6 02/06/2020     Lab Results   Component Value Date    HGB 10.3 02/06/2020     Lab Results   Component Value Date    HCT 32.5 02/06/2020     Lab Results   Component Value Date    MCV 95 02/06/2020     Lab Results   Component Value Date    PLT 63 02/06/2020       Lab Results   Component Value Date    AST 39 02/06/2020     Lab Results   Component Value Date    ALT 19 02/06/2020     Lab Results   Component Value Date    BILICONJ 0.1 03/30/2011      Lab Results   Component Value Date    BILITOTAL 2.5 02/06/2020       Lab Results   Component Value Date    ALBUMIN 2.5 02/06/2020     Lab Results   Component Value Date    PROTTOTAL 5.4 02/06/2020      Lab Results   Component Value Date    ALKPHOS 142 02/06/2020       Lab Results   Component Value Date    INR 1.86 02/06/2020     1/15/2020:   US TIPSS DOPPLER:   DOPPLER:      There is antegrade flow in the main portal vein:  37 cm/sec in the main portal vein  Retroverted at 19 cm/sec in the right portal vein  Retrograde at 8 cm/sec in the left portal vein     There is antegrade in the hepatic artery:  133 cm/sec peak systolic flow  18 cm/sec minimum diastolic flow   0.86 resistive index      The stent velocities are  151 cm/sec at the portal vein  180 cm/sec in mid stent  193 cm/sec in the hepatic vein distal shunt end.      The splenic vein is patent and flow is towards the liver.      The left, middle, and right hepatic veins are patent with flow towards  the IVC.                                                                        Impression:   Patent TIPS.          Again, thank you for allowing me to participate in the care of your patient.      Sincerely,    Celestino Verduzco PA-C

## 2020-02-06 NOTE — PROGRESS NOTES
Paracentesis Nursing Note  Ivan Bell presents today to [facility] for a paracentesis.    During today's appointment orders from Celestino Verduzco PA-C were completed.    Progress Note:  Patient identification verified by name and date of birth.  Assessment completed.  Vitals monitored throughout appointment and recorded in Doc Flowsheets.  See proceduralist note in ultrasound.    Vascular Access: peripheral IV was placed by vascular access nurse.  Labs: were drawn prior to appointment on 2/6/20.    Date of consent or authorization: 1/23/20.  Invasive Procedure Safety Checklist was completed and sent for scanning.     Paracentesis performed by Portillo Hancock PA-C Radiology.    The following labs were communicated to provider performing paracentesis:  Lab Results   Component Value Date    PLT 63 02/06/2020       Total amount of ascites fluid drained: 4 liters.  Color of ascites fluid: yellow.  Total amount of albumin given: 50  grams.    Patient tolerated procedure well.    Post procedure,denies pain or discomfort post paracentesis.      Discharge Plan:  Discharge instructions were reviewed with patient.  Patient/Representative verbalized understanding and all questions were answered.   Discharged from [facility] in stable condition.    Maya Monroe RN    Administrations This Visit     albumin human 25 % injection 12.5 g     Admin Date  02/06/2020 Action  New Bag Dose  12.5 g Route  Intravenous Administered By  Maya Monroe RN           Admin Date  02/06/2020 Action  New Bag Dose  12.5 g Route  Intravenous Administered By  Maya Monroe RN           Admin Date  02/06/2020 Action  New Bag Dose  12.5 g Route  Intravenous Administered By  Maya Monroe, DILIA           Admin Date  02/06/2020 Action  New Bag Dose  12.5 g Route  Intravenous Administered By  Maya Monroe RN          lidocaine 1 % 20 mL     Admin Date  02/06/2020 Action  Given by  Other Dose  10 mL Route  Other Administered By  Maya Monroe, DILIA                Temp 97.9  F (36.6  C) (Oral)   Wt 77.4 kg (170 lb 9.6 oz)   SpO2 98%   BMI 24.48 kg/m

## 2020-02-06 NOTE — PROGRESS NOTES
Hepatology Follow-up Clinic note  Ivan Bell   Date of Birth 1965  Date of Service 2/11/2020    Reason for follow-up: ETOH cirrhosis         Assessment/plan:   Ivan Bell is a 54 year old male with ETOH cirrhosis complicated by paracentesis S/P TIPS and recent revision in Nov 2019, hepatic encephalopathy and portal hypertension gastropathy. He has been sober from alcohol since January 2019. He continues to have moderate liver dysfunction, MELD-Na 19. Patient will need to complete CD evaluation if he wishes to move forward with liver transplant evaluation given multiple . He is up to date with HCC screening and does not need variceal screening with patent TIPS.     # Ascites:   - Recent US abdomen noting patent TIPS  - Increase to 150 mg spironolactone  - Continue 80 mg lasix twice daily   - Repeat BMP in one week   # History of alcohol abuse:   - Continue absolute sobriety from ETOH. Will recommend completing CD evaluation in order to be a liver transplant candidate.   # Hepatic encephalopathy:   - Continue Rifaximin 550 mg twice daily   - Continue lactulose (dose to 3-5 bowel movements a day)  - Follow-up in clinic with Dr. Bales in 3 months     Celestino Verduzco PA-C   [facility] Hepatology clinic    -----------------------------------------------------       HPI:   Ivan Bell is a 54 year old male presenting for follow-up.     Cirrhosis  - ETOH  - hx ascites, TIPS placement 5/14/18, 6/6/18  - hx HE  - hx variceal bleed Oct 2017  - ETOH hepatitis March 2019  - last EGD Apr 2018- medium EV with bandingx4, mild portal hypertensive gastropathy  - HCC screening- US TIPPS on 1/15/2020     Patient was last by Dr. Bales on 10/7/2019. Patient is following up after recent ER visit for hypokalemia and diarrhea.     His lactulose dose has been adjusted. He also adjusted his diet and is not eating so much fruit during the day, which helped with his diarrhea. He is currently taking 15 mL of lactulose a day  and having 3-4 bowel movements a day. He is also taking Rifaximin twice daily. No problems with confusion since recent ER visit.     He is still having large volume paracentesis post TIPs revision in November 2019. He has US Abdomen in mid-January that showed a patent TIPS. His last paracentesis was 7 L and was two weeks ago.     He is currently taking 40 mg Lasix once daily and 75 mg spironolactone as well as 40 mEq potassium.     He last drank alcohol one year ago. He has not gone through any support groups or classes. He is still working, removing snow.     Medical hx Surgical hx   Past Medical History:   Diagnosis Date     Alcoholic cirrhosis (H)      Alcoholic hepatitis 03/2019     Gout      Hypertension      Hypertriglyceridemia      Left calcaneus fracture 1/9/2006     Portal vein thrombosis     Past Surgical History:   Procedure Laterality Date     ANKLE SURGERY Left      COLONOSCOPY N/A 3/31/2016    Procedure: COLONOSCOPY;  Surgeon: Rhys Uriostegui MD;  Location:  GI     ESOPHAGOSCOPY, GASTROSCOPY, DUODENOSCOPY (EGD), COMBINED N/A 3/31/2016    Procedure: COMBINED ESOPHAGOSCOPY, GASTROSCOPY, DUODENOSCOPY (EGD);  Surgeon: Rhys Uriostegui MD;  Location:  GI     ESOPHAGOSCOPY, GASTROSCOPY, DUODENOSCOPY (EGD), COMBINED N/A 3/9/2018    Procedure: COMBINED ESOPHAGOSCOPY, GASTROSCOPY, DUODENOSCOPY (EGD), BIOPSY SINGLE OR MULTIPLE;  EGD;  Surgeon: Gonzalo Wahl MD;  Location:  GI     ESOPHAGOSCOPY, GASTROSCOPY, DUODENOSCOPY (EGD), COMBINED N/A 6/7/2019    Procedure: ESOPHAGOGASTRODUODENOSCOPY (EGD);  Surgeon: Gonzalo Wahl MD;  Location:  GI     IR PARACENTESIS  10/29/2019     IR TRANSVEN INTRAHEPATIC PORTOSYST REV  10/29/2019     KNEE SURGERY Left      KNEE SURGERY Right      SIGMOIDOSCOPY FLEXIBLE N/A 10/31/2017    Procedure: SIGMOIDOSCOPY FLEXIBLE;;  Surgeon: Armaan Adams MD;  Location:  GI     TIPS Procedure  06/06/2018                 Medications:  "    Current Outpatient Medications   Medication     Ascorbic Acid (VITAMIN C PO)     CONSTULOSE 10 GM/15ML solution     furosemide (LASIX) 40 MG tablet     Menaquinone-7 (VITAMIN K2) 100 MCG CAPS     MULTIPLE VITAMINS PO     potassium chloride ER (K-DUR/KLOR-CON M) 20 MEQ CR tablet     rifaximin (XIFAXAN) 550 MG TABS tablet     sertraline (ZOLOFT) 50 MG tablet     sodium bicarbonate 650 MG tablet     spironolactone (ALDACTONE) 50 MG tablet     Thiamine HCl (B-1) 100 MG TABS     vitamin D3 (CHOLECALCIFEROL) 2000 units (50 mcg) tablet     No current facility-administered medications for this visit.             Allergies:     Allergies   Allergen Reactions     Prednisone Visual Disturbance     Trazodone Visual Disturbance     Benadryl [Diphenhydramine] Other (See Comments)     Delirium (visual and auditory hallucinations)     Oxycodone Other (See Comments)     Delirium and constipation            Review of Systems:   10 points ROS was obtained and highlighted in the HPI, otherwise negative.          Physical Exam:   VS:  /69 (BP Location: Right arm, Patient Position: Sitting, Cuff Size: Adult Regular)   Pulse 80   Temp 98.3  F (36.8  C) (Oral)   Resp 20   Ht 1.778 m (5' 10\")   Wt 77.3 kg (170 lb 8 oz)   SpO2 100%   BMI 24.46 kg/m        Gen: A&Ox3, NAD, thin  HEENT: non-icteric   CV: RRR, no overt murmurs  Lung: CTA Bilatererally, no wheezing or crackles.   Lym- no palpable lymphadenopathy  Abd: soft, NT, ND,  Moderate ascites   Ext: no edema, intact pulses.   Skin: No rash,  no palmar erythema, telangiectasias or jaundice  Neuro: grossly intact, no asterixis   Psych: appropriate mood and affects           Data:   Reviewed in person and significant for:    Lab Results   Component Value Date     02/06/2020      Lab Results   Component Value Date    POTASSIUM 3.9 02/06/2020     Lab Results   Component Value Date    CHLORIDE 112 02/06/2020     Lab Results   Component Value Date    CO2 24 02/06/2020 "     Lab Results   Component Value Date    BUN 13 02/06/2020     Lab Results   Component Value Date    CR 1.20 02/06/2020       Lab Results   Component Value Date    WBC 3.6 02/06/2020     Lab Results   Component Value Date    HGB 10.3 02/06/2020     Lab Results   Component Value Date    HCT 32.5 02/06/2020     Lab Results   Component Value Date    MCV 95 02/06/2020     Lab Results   Component Value Date    PLT 63 02/06/2020       Lab Results   Component Value Date    AST 39 02/06/2020     Lab Results   Component Value Date    ALT 19 02/06/2020     Lab Results   Component Value Date    BILICONJ 0.1 03/30/2011      Lab Results   Component Value Date    BILITOTAL 2.5 02/06/2020       Lab Results   Component Value Date    ALBUMIN 2.5 02/06/2020     Lab Results   Component Value Date    PROTTOTAL 5.4 02/06/2020      Lab Results   Component Value Date    ALKPHOS 142 02/06/2020       Lab Results   Component Value Date    INR 1.86 02/06/2020     1/15/2020:   US TIPSS DOPPLER:   DOPPLER:      There is antegrade flow in the main portal vein:  37 cm/sec in the main portal vein  Retroverted at 19 cm/sec in the right portal vein  Retrograde at 8 cm/sec in the left portal vein     There is antegrade in the hepatic artery:  133 cm/sec peak systolic flow  18 cm/sec minimum diastolic flow   0.86 resistive index      The stent velocities are  151 cm/sec at the portal vein  180 cm/sec in mid stent  193 cm/sec in the hepatic vein distal shunt end.      The splenic vein is patent and flow is towards the liver.      The left, middle, and right hepatic veins are patent with flow towards  the IVC.                                                                       Impression:   Patent TIPS.

## 2020-02-06 NOTE — LETTER
2/6/2020      RE: Ivan Bell  [address] [facility] [address]         Hepatology Follow-up Clinic note  Ivan Bell   Date of Birth 1965  Date of Service 2/11/2020    Reason for follow-up: ETOH cirrhosis         Assessment/plan:   Ivan Bell is a 54 year old male with ETOH cirrhosis complicated by paracentesis S/P TIPS and recent revision in Nov 2019, hepatic encephalopathy and portal hypertension gastropathy. He has been sober from alcohol since January 2019. He continues to have moderate liver dysfunction, MELD-Na 19. Patient will need to complete CD evaluation if he wishes to move forward with liver transplant evaluation given multiple . He is up to date with HCC screening and does not need variceal screening with patent TIPS.     # Ascites:   - Recent US abdomen noting patent TIPS  - Increase to 150 mg spironolactone  - Continue 80 mg lasix twice daily   - Repeat BMP in one week   # History of alcohol abuse:   - Continue absolute sobriety from ETOH. Will recommend completing CD evaluation in order to be a liver transplant candidate.   # Hepatic encephalopathy:   - Continue Rifaximin 550 mg twice daily   - Continue lactulose (dose to 3-5 bowel movements a day)  - Follow-up in clinic with Dr. Bales in 3 months     Celestino Verduzco PA-C   [facility] Hepatology clinic    -----------------------------------------------------       HPI:   Ivan Bell is a 54 year old male presenting for follow-up.     Cirrhosis  - ETOH  - hx ascites, TIPS placement 5/14/18, 6/6/18  - hx HE  - hx variceal bleed Oct 2017  - ETOH hepatitis March 2019  - last EGD Apr 2018- medium EV with bandingx4, mild portal hypertensive gastropathy  - HCC screening- US TIPPS on 1/15/2020     Patient was last by Dr. Bales on 10/7/2019. Patient is following up after recent ER visit for hypokalemia and diarrhea.     His lactulose dose has been adjusted. He also adjusted his diet and is not eating so much fruit during the  day, which helped with his diarrhea. He is currently taking 15 mL of lactulose a day and having 3-4 bowel movements a day. He is also taking Rifaximin twice daily. No problems with confusion since recent ER visit.     He is still having large volume paracentesis post TIPs revision in November 2019. He has US Abdomen in mid-January that showed a patent TIPS. His last paracentesis was 7 L and was two weeks ago.     He is currently taking 40 mg Lasix once daily and 75 mg spironolactone as well as 40 mEq potassium.     He last drank alcohol one year ago. He has not gone through any support groups or classes. He is still working, removing snow.     Medical hx Surgical hx   Past Medical History:   Diagnosis Date     Alcoholic cirrhosis (H)      Alcoholic hepatitis 03/2019     Gout      Hypertension      Hypertriglyceridemia      Left calcaneus fracture 1/9/2006     Portal vein thrombosis     Past Surgical History:   Procedure Laterality Date     ANKLE SURGERY Left      COLONOSCOPY N/A 3/31/2016    Procedure: COLONOSCOPY;  Surgeon: Rhys Uriostegui MD;  Location:  GI     ESOPHAGOSCOPY, GASTROSCOPY, DUODENOSCOPY (EGD), COMBINED N/A 3/31/2016    Procedure: COMBINED ESOPHAGOSCOPY, GASTROSCOPY, DUODENOSCOPY (EGD);  Surgeon: Rhys Uriostegui MD;  Location:  GI     ESOPHAGOSCOPY, GASTROSCOPY, DUODENOSCOPY (EGD), COMBINED N/A 3/9/2018    Procedure: COMBINED ESOPHAGOSCOPY, GASTROSCOPY, DUODENOSCOPY (EGD), BIOPSY SINGLE OR MULTIPLE;  EGD;  Surgeon: Gonzalo Wahl MD;  Location:  GI     ESOPHAGOSCOPY, GASTROSCOPY, DUODENOSCOPY (EGD), COMBINED N/A 6/7/2019    Procedure: ESOPHAGOGASTRODUODENOSCOPY (EGD);  Surgeon: Gonzalo Wahl MD;  Location: [facility]     IR PARACENTESIS  10/29/2019     IR TRANSVEN INTRAHEPATIC PORTOSYST REV  10/29/2019     KNEE SURGERY Left      KNEE SURGERY Right      SIGMOIDOSCOPY FLEXIBLE N/A 10/31/2017    Procedure: SIGMOIDOSCOPY FLEXIBLE;;  Surgeon: Armaan Adams  "MD;  Location: [facility]     TIPS Procedure  06/06/2018                 Medications:     Current Outpatient Medications   Medication     Ascorbic Acid (VITAMIN C PO)     CONSTULOSE 10 GM/15ML solution     furosemide (LASIX) 40 MG tablet     Menaquinone-7 (VITAMIN K2) 100 MCG CAPS     MULTIPLE VITAMINS PO     potassium chloride ER (K-DUR/KLOR-CON M) 20 MEQ CR tablet     rifaximin (XIFAXAN) 550 MG TABS tablet     sertraline (ZOLOFT) 50 MG tablet     sodium bicarbonate 650 MG tablet     spironolactone (ALDACTONE) 50 MG tablet     Thiamine HCl (B-1) 100 MG TABS     vitamin D3 (CHOLECALCIFEROL) 2000 units (50 mcg) tablet     No current facility-administered medications for this visit.             Allergies:     Allergies   Allergen Reactions     Prednisone Visual Disturbance     Trazodone Visual Disturbance     Benadryl [Diphenhydramine] Other (See Comments)     Delirium (visual and auditory hallucinations)     Oxycodone Other (See Comments)     Delirium and constipation            Review of Systems:   10 points ROS was obtained and highlighted in the HPI, otherwise negative.          Physical Exam:   VS:  /69 (BP Location: Right arm, Patient Position: Sitting, Cuff Size: Adult Regular)   Pulse 80   Temp 98.3  F (36.8  C) (Oral)   Resp 20   Ht 1.778 m (5' 10\")   Wt 77.3 kg (170 lb 8 oz)   SpO2 100%   BMI 24.46 kg/m         Gen: A&Ox3, NAD, thin  HEENT: non-icteric   CV: RRR, no overt murmurs  Lung: CTA Bilatererally, no wheezing or crackles.   Lym- no palpable lymphadenopathy  Abd: soft, NT, ND,  Moderate ascites   Ext: no edema, intact pulses.   Skin: No rash,  no palmar erythema, telangiectasias or jaundice  Neuro: grossly intact, no asterixis   Psych: appropriate mood and affects           Data:   Reviewed in person and significant for:    Lab Results   Component Value Date     02/06/2020      Lab Results   Component Value Date    POTASSIUM 3.9 02/06/2020     Lab Results   Component Value Date    " CHLORIDE 112 02/06/2020     Lab Results   Component Value Date    CO2 24 02/06/2020     Lab Results   Component Value Date    BUN 13 02/06/2020     Lab Results   Component Value Date    CR 1.20 02/06/2020       Lab Results   Component Value Date    WBC 3.6 02/06/2020     Lab Results   Component Value Date    HGB 10.3 02/06/2020     Lab Results   Component Value Date    HCT 32.5 02/06/2020     Lab Results   Component Value Date    MCV 95 02/06/2020     Lab Results   Component Value Date    PLT 63 02/06/2020       Lab Results   Component Value Date    AST 39 02/06/2020     Lab Results   Component Value Date    ALT 19 02/06/2020     Lab Results   Component Value Date    BILICONJ 0.1 03/30/2011      Lab Results   Component Value Date    BILITOTAL 2.5 02/06/2020       Lab Results   Component Value Date    ALBUMIN 2.5 02/06/2020     Lab Results   Component Value Date    PROTTOTAL 5.4 02/06/2020      Lab Results   Component Value Date    ALKPHOS 142 02/06/2020       Lab Results   Component Value Date    INR 1.86 02/06/2020     1/15/2020:   US TIPSS DOPPLER:   DOPPLER:      There is antegrade flow in the main portal vein:  37 cm/sec in the main portal vein  Retroverted at 19 cm/sec in the right portal vein  Retrograde at 8 cm/sec in the left portal vein     There is antegrade in the hepatic artery:  133 cm/sec peak systolic flow  18 cm/sec minimum diastolic flow   0.86 resistive index      The stent velocities are  151 cm/sec at the portal vein  180 cm/sec in mid stent  193 cm/sec in the hepatic vein distal shunt end.      The splenic vein is patent and flow is towards the liver.      The left, middle, and right hepatic veins are patent with flow towards  the IVC.                                                                       Impression:   Patent TIPS.          Celestino Verduzco PA-C

## 2020-02-06 NOTE — NURSING NOTE
Met with patient and wife Elicia along with Celestino Verduzco PA-C. Patient currently on Lasix 40 mg and spironolactone 75 mg daily, K+ 40 MEq daily, and lactulose 15 ml daily with 3-4 bowel movements daily. TIPS revision last done November 2019, and most recent para about 2 weeks ago. Patient is scheduled for para this morning.   Plan per Celestino Verduzco PA-C: increase Lasix to 80 mg daily and increase spironolactone to 125 mg daily, labs in 1 week.

## 2020-02-06 NOTE — NURSING NOTE
"Chief Complaint   Patient presents with     RECHECK     alcohol induced cirrhosis       Vital signs:  Temp: 98.3  F (36.8  C) Temp src: Oral BP: 131/69 Pulse: 80   Resp: 20 SpO2: 100 %     Height: 177.8 cm (5' 10\") Weight: 77.3 kg (170 lb 8 oz)  Estimated body mass index is 24.46 kg/m  as calculated from the following:    Height as of this encounter: 1.778 m (5' 10\").    Weight as of this encounter: 77.3 kg (170 lb 8 oz).          Lida Hutchinson, [facility] CMA  2/6/2020 9:13 AM      "

## 2020-02-13 DIAGNOSIS — K70.31 ALCOHOLIC CIRRHOSIS OF LIVER WITH ASCITES (H): ICD-10-CM

## 2020-02-13 LAB
ANION GAP SERPL CALCULATED.3IONS-SCNC: 2 MMOL/L (ref 3–14)
BUN SERPL-MCNC: 15 MG/DL (ref 7–30)
CALCIUM SERPL-MCNC: 8.3 MG/DL (ref 8.5–10.1)
CHLORIDE SERPL-SCNC: 105 MMOL/L (ref 94–109)
CO2 SERPL-SCNC: 30 MMOL/L (ref 20–32)
CREAT SERPL-MCNC: 1.26 MG/DL (ref 0.66–1.25)
GFR SERPL CREATININE-BSD FRML MDRD: 63 ML/MIN/{1.73_M2}
GLUCOSE SERPL-MCNC: 150 MG/DL (ref 70–99)
POTASSIUM SERPL-SCNC: 3.7 MMOL/L (ref 3.4–5.3)
SODIUM SERPL-SCNC: 137 MMOL/L (ref 133–144)

## 2020-02-13 PROCEDURE — 80048 BASIC METABOLIC PNL TOTAL CA: CPT | Performed by: PHYSICIAN ASSISTANT

## 2020-02-13 PROCEDURE — 36415 COLL VENOUS BLD VENIPUNCTURE: CPT | Performed by: PHYSICIAN ASSISTANT

## 2020-02-14 ENCOUNTER — PATIENT OUTREACH (OUTPATIENT)
Dept: GASTROENTEROLOGY | Facility: CLINIC | Age: 55
End: 2020-02-14

## 2020-02-14 DIAGNOSIS — K70.31 ALCOHOLIC CIRRHOSIS OF LIVER WITH ASCITES (H): Primary | ICD-10-CM

## 2020-02-14 NOTE — PROGRESS NOTES
Patient is currently taking Lasix 80 mg daily and spironolactone 150 mg daily. Per eClestino Verduzco PA-C patient instructed to decrease lasix to 60 mg daily, and repeat labs in 1 week. This information was left in a message. Patient's spouse called back this morning and confirmed message and patient will decrease lasix as instructed and repeat labs next week. She has no further questions.

## 2020-02-20 DIAGNOSIS — K70.31 ALCOHOLIC CIRRHOSIS OF LIVER WITH ASCITES (H): ICD-10-CM

## 2020-02-20 LAB
ANION GAP SERPL CALCULATED.3IONS-SCNC: 1 MMOL/L (ref 3–14)
BUN SERPL-MCNC: 15 MG/DL (ref 7–30)
CALCIUM SERPL-MCNC: 8.8 MG/DL (ref 8.5–10.1)
CHLORIDE SERPL-SCNC: 108 MMOL/L (ref 94–109)
CO2 SERPL-SCNC: 27 MMOL/L (ref 20–32)
CREAT SERPL-MCNC: 1.31 MG/DL (ref 0.66–1.25)
GFR SERPL CREATININE-BSD FRML MDRD: 60 ML/MIN/{1.73_M2}
GLUCOSE SERPL-MCNC: 132 MG/DL (ref 70–99)
POTASSIUM SERPL-SCNC: 4.2 MMOL/L (ref 3.4–5.3)
SODIUM SERPL-SCNC: 136 MMOL/L (ref 133–144)

## 2020-02-20 PROCEDURE — 80048 BASIC METABOLIC PNL TOTAL CA: CPT | Performed by: PHYSICIAN ASSISTANT

## 2020-02-20 PROCEDURE — 36415 COLL VENOUS BLD VENIPUNCTURE: CPT | Performed by: PHYSICIAN ASSISTANT

## 2020-02-21 ENCOUNTER — PATIENT OUTREACH (OUTPATIENT)
Dept: GASTROENTEROLOGY | Facility: CLINIC | Age: 55
End: 2020-02-21

## 2020-02-21 DIAGNOSIS — K70.31 ALCOHOLIC CIRRHOSIS OF LIVER WITH ASCITES (H): Primary | ICD-10-CM

## 2020-02-21 NOTE — PROGRESS NOTES
Attempted to reach patient for check in, no answer, message left requesting call back, number provided. Also relayed the following instructions per Celestino Verduzco PA-C:  Please have patient reduce duiretics dose to 100 mg spironolactone and 40 mg Lasix. Repeat BMP in one week. If patient wants to consider liver transplantation, will need to have chemical dependency evaluation and move forward with their recommendations. I suggest that he does not delay doing this.   Patient's spouse Elicia returned call and this information was relayed to her. She verbalized understanding and has no further questions.

## 2020-02-27 ENCOUNTER — PATIENT OUTREACH (OUTPATIENT)
Dept: GASTROENTEROLOGY | Facility: CLINIC | Age: 55
End: 2020-02-27

## 2020-02-27 DIAGNOSIS — K70.31 ALCOHOLIC CIRRHOSIS OF LIVER WITH ASCITES (H): ICD-10-CM

## 2020-02-27 LAB
ANION GAP SERPL CALCULATED.3IONS-SCNC: 4 MMOL/L (ref 3–14)
BUN SERPL-MCNC: 13 MG/DL (ref 7–30)
CALCIUM SERPL-MCNC: 9 MG/DL (ref 8.5–10.1)
CHLORIDE SERPL-SCNC: 111 MMOL/L (ref 94–109)
CO2 SERPL-SCNC: 25 MMOL/L (ref 20–32)
CREAT SERPL-MCNC: 1.24 MG/DL (ref 0.66–1.25)
GFR SERPL CREATININE-BSD FRML MDRD: 64 ML/MIN/{1.73_M2}
GLUCOSE SERPL-MCNC: 147 MG/DL (ref 70–99)
POTASSIUM SERPL-SCNC: 4 MMOL/L (ref 3.4–5.3)
SODIUM SERPL-SCNC: 140 MMOL/L (ref 133–144)

## 2020-02-27 PROCEDURE — 80048 BASIC METABOLIC PNL TOTAL CA: CPT | Performed by: PHYSICIAN ASSISTANT

## 2020-02-27 PROCEDURE — 36415 COLL VENOUS BLD VENIPUNCTURE: CPT | Performed by: PHYSICIAN ASSISTANT

## 2020-02-27 NOTE — PROGRESS NOTES
Per wife, patient reports increased weight in recent weeks, and slight increase in abdominal girth. He scheduled a paracentesis for 3/6 to assess for fluid. Patient had decreased diuretics on 2/21 to Lasix 40 mg daily and spironolactone 100 mg daily, and repeat BMP done today.

## 2020-02-28 NOTE — PROGRESS NOTES
Patient reports about 10 lb weight gain, but denies lower extremity swelling or increased abdominal girth. He states he is working and has increased appetite, but he does have a paracentesis scheduled just to check and see if there is fluid build up. Para is now scheduled 3/3. Reviewed instructions per Celestino Verduzco to continue diuretics at the same dose, Lasix 40 mg daily and spironolactone 100 mg daily. Patient agrees with this plan.   Patient also asking for phone number to schedule a chemical dependency evaluation. Per  patient can call [phone], message left with patient.

## 2020-03-03 ENCOUNTER — OFFICE VISIT (OUTPATIENT)
Dept: INFUSION THERAPY | Facility: CLINIC | Age: 55
End: 2020-03-03
Attending: PHYSICIAN ASSISTANT
Payer: COMMERCIAL

## 2020-03-03 ENCOUNTER — ANCILLARY PROCEDURE (OUTPATIENT)
Dept: ULTRASOUND IMAGING | Facility: CLINIC | Age: 55
End: 2020-03-03
Attending: PHYSICIAN ASSISTANT
Payer: COMMERCIAL

## 2020-03-03 ENCOUNTER — APPOINTMENT (OUTPATIENT)
Dept: LAB | Facility: CLINIC | Age: 55
End: 2020-03-03
Attending: PHYSICIAN ASSISTANT
Payer: COMMERCIAL

## 2020-03-03 ENCOUNTER — PATIENT OUTREACH (OUTPATIENT)
Dept: GASTROENTEROLOGY | Facility: CLINIC | Age: 55
End: 2020-03-03

## 2020-03-03 VITALS
RESPIRATION RATE: 16 BRPM | WEIGHT: 170.9 LBS | OXYGEN SATURATION: 99 % | TEMPERATURE: 97.9 F | BODY MASS INDEX: 24.52 KG/M2

## 2020-03-03 DIAGNOSIS — K70.31 ALCOHOLIC CIRRHOSIS OF LIVER WITH ASCITES (H): Primary | ICD-10-CM

## 2020-03-03 LAB — PLATELET # BLD AUTO: 56 10E9/L (ref 150–450)

## 2020-03-03 PROCEDURE — 76705 ECHO EXAM OF ABDOMEN: CPT

## 2020-03-03 PROCEDURE — 85049 AUTOMATED PLATELET COUNT: CPT | Performed by: PHYSICIAN ASSISTANT

## 2020-03-03 RX ORDER — ALBUMIN (HUMAN) 12.5 G/50ML
12.5 SOLUTION INTRAVENOUS 4 TIMES DAILY PRN
Status: CANCELLED
Start: 2020-03-03

## 2020-03-03 NOTE — PROGRESS NOTES
"Patient called to report he went in today and had ultrasound for paracentesis, and states they found inadequate fluid to drain. He was pleased with this, as he has had recent weight gain. He states he has been eating \"like a horse\" and much more physically active, working on projects on his home. He would like to decrease his diuretics if Celestino thinks that would be okay to try. Also provided phone number for patient to schedule a chemical dependency assessment. Patient has no further questions at this time.  Addendum: Per Celestino Verduzco PA-C patient can decrease diuretics, Lasix to 20 mg daily and spironolactone to 50 mg daily. Watch for fluid retention and if it becomes an issue again, call the clinic. This information was left on patient's home phone and his mobile, requested call back for receipt of this information., number provided.  "

## 2020-03-03 NOTE — PROGRESS NOTES
Paracentesis Nursing Note  Ivan Bell presents today to [facility] for [facility].    During today's appointment orders from Celestino Verduzco PA-C were completed.    Progress Note:  Patient identification verified by name and date of birth. Vitals obtained. Assessment completed. It was determined after ultrasound that there was inadequate fluid to proceed with paracentesis today. Patient denied abdominal fullness or discomfort. Platelets were drawn. Patient was discharged from [facility] in stable condition.     Consent completed: 1/23/2020

## 2020-03-03 NOTE — PATIENT INSTRUCTIONS
Dear Ivan Bell    Thank you for choosing [facility] ([facility]) for your paracentesis.  The following information is a summary of our appointment as well as important reminders.      Patient Education     Discharge Instructions for Paracentesis  Paracentesis is a procedure to remove extra fluid from your belly (abdomen). This fluid buildup in the abdomen is called ascites. The procedure may have been done to take a sample of the fluid. Or, it may have been done to drain the extra fluid from your abdomen and help make you more comfortable.     Ascites is buildup of excess fluid in the abdomen.   Home care    If you have pain after the procedure, your healthcare provider can prescribe or recommend pain medicines. Take these exactly as directed. If you stopped taking other medicines before the procedure, ask your provider when you can start them again.    Take it easy for 24 hours after the procedure. Avoid physical activity until your provider says it s OK.    You will have a small bandage over the puncture site. Stitches (sutures), surgical staples, adhesive tapes, adhesive strips, or surgical glue may be used to close the incision. They also help stop bleeding and speed healing. You may take the bandage off in 24 hours.    Check the puncture site for the signs of infection listed below.  Follow-up care  Make a follow-up appointment with your healthcare provider as directed. During your follow-up visit, your provider will check your healing. Let your provider know how you are feeling. You can also discuss the cause of your ascites and if you need any further treatment.  When to seek medical advice  Call your healthcare provider if you have any of the following after the procedure:    A fever of 100.4 F (38 C) or higher    Trouble breathing    Pain that doesn't go away even after taking pain medicine    Belly pain not caused by having the skin  punctured    Bleeding from the puncture site    More than a small amount of fluid leaking from the puncture site    Swollen belly    Signs of infection at the puncture site. These include increased pain, redness, or swelling, warmth, or bad-smelling drainage.    Blood in your urine    Feeling dizzy or lightheaded, or fainting   Date Last Reviewed: 7/1/2016 2000-2019 [employer]. [address] [place] [address]. All rights reserved. This information is not intended as a substitute for professional medical care. Always follow your healthcare professional's instructions.             We look forward in seeing you on your next appointment here at [facility] ([facility]).  Please don t hesitate to call us at [phone] to reschedule any of your appointments or to speak with one of the [facility] registered nurses.  It was a pleasure taking care of you today.    Sincerely,    [facility]  [address] [place] [address]  [address]  Phone:  [phone]

## 2020-03-03 NOTE — PROGRESS NOTES
Chief Complaint   Patient presents with     Blood Draw     No lab orders. Patient checked into appointment.      Rekha Reyes RN

## 2020-03-16 ENCOUNTER — PATIENT OUTREACH (OUTPATIENT)
Dept: GASTROENTEROLOGY | Facility: CLINIC | Age: 55
End: 2020-03-16

## 2020-03-16 NOTE — PROGRESS NOTES
Patient called to check if he needs to check labs at all since decreasing his diuretics. He states he is feeling good, no signs of swelling or fluid retention with the decreased dose, great appetite.Patient had decreased diuretics to lasix 20 mg daily and spironolactone 50 mg daily. His only concern is feeling increased fatigue in recent days. He reports taking lactulose and xifaxan as ordered, no change in bowel movements, and denies confusion or foggy thinking.  Per Celestino Verduzco PA-C he does not need to check labs at this time. Patient encouraged to call clinic as needed for change in status.

## 2020-03-18 ENCOUNTER — PRE VISIT (OUTPATIENT)
Dept: NEPHROLOGY | Facility: CLINIC | Age: 55
End: 2020-03-18

## 2020-03-18 ENCOUNTER — TELEPHONE (OUTPATIENT)
Dept: NEPHROLOGY | Facility: CLINIC | Age: 55
End: 2020-03-18

## 2020-03-18 NOTE — TELEPHONE ENCOUNTER
Patient with history of kidney stones coming in for kidney stone prevention discussion with Dr. Moses. [place] not available at this time.

## 2020-03-18 NOTE — TELEPHONE ENCOUNTER
Contacted patient and informed of need to change to telephone visit. Patient stated understanding and agreement with plan.  Precious Dixon LPN

## 2020-03-22 DIAGNOSIS — K76.82 HEPATIC ENCEPHALOPATHY (H): ICD-10-CM

## 2020-03-23 NOTE — TELEPHONE ENCOUNTER
Requested Prescriptions   Pending Prescriptions Disp Refills     rifaximin (XIFAXAN) 550 MG TABS tablet 180 tablet 0     Sig: Take 1 tablet (550 mg) by mouth 2 times daily  Last Written Prescription Date:  12/16/2019 #180 x 0  Last filled - not provided  Last office visit: 8/23/2019 [facility] [place]     Future Office Visit:   Next 5 appointments (look out 90 days)    Mar 24, 2020  1:00 PM CDT  Telephone Visit with Alma Moses MD  [facility] ([facility]) [address] [place] [id number] [phone]                There is no refill protocol information for this order

## 2020-03-24 ENCOUNTER — VIRTUAL VISIT (OUTPATIENT)
Dept: NEPHROLOGY | Facility: CLINIC | Age: 55
End: 2020-03-24
Payer: COMMERCIAL

## 2020-03-24 DIAGNOSIS — N20.0 RECURRENT KIDNEY STONES: Primary | ICD-10-CM

## 2020-03-24 NOTE — PROGRESS NOTES
"Ivan Bell is a 54 year old male who is being evaluated via a billable telephone visit.      The patient has been notified of following:     \"This telephone visit will be conducted via a call between you and your physician/provider. We have found that certain health care needs can be provided without the need for a physical exam.  This service lets us provide the care you need with a short phone conversation.  If a prescription is necessary we can send it directly to your pharmacy.  If lab work is needed we can place an order for that and you can then stop by our lab to have the test done at a later time.    If during the course of the call the physician/provider feels a telephone visit is not appropriate, you will not be charged for this service.\"     Ivan Bell complains of    Chief Complaint   Patient presents with     Telephone     Stone Consult      Last visit Nov 2019 - I advised completion of 24 hour urine chemistries to determine kidney stone risk factors - he opted against this.    Hospitalized with hypokalemia 12/31/2019    Had diarrhea with higher fresh fruit intake, diarrhea improved with lowering fruit intake.      I have reviewed and updated the patient's Past Medical History, Social History, Family History and Medication List.    Current Outpatient Medications   Medication Sig Dispense Refill     Ascorbic Acid (VITAMIN C PO) Take by mouth daily       CONSTULOSE 10 GM/15ML solution TAKE 30MLS BY MOUTH THREE TIMES DAILY AS NEEDED FOR CONSTIPATION (TAKE AS NEEDED TO MAINTAIN 3 TO 4 BOWEL MOVEMENTS DAILY) (Patient taking differently: 30 g in am and 15 g in pm) 1000 mL 11     furosemide (LASIX) 40 MG tablet Take 2 tablets (80 mg) by mouth daily (Patient taking differently: Take 40 mg by mouth daily ) 60 tablet 1     Menaquinone-7 (VITAMIN K2) 100 MCG CAPS Take 200 mcg by mouth daily        MULTIPLE VITAMINS PO Take 1 tablet by mouth daily       potassium chloride ER (K-DUR/KLOR-CON M) 20 MEQ CR " tablet Take 2 tablets (40 mEq) by mouth daily 120 tablet 3     rifaximin (XIFAXAN) 550 MG TABS tablet Take 1 tablet (550 mg) by mouth 2 times daily 180 tablet 0     sertraline (ZOLOFT) 50 MG tablet Take 0.5 tablets (25 mg) by mouth daily 30 tablet 3     sodium bicarbonate 650 MG tablet Take 1 tablet (650 mg) by mouth 2 times daily 180 tablet 3     spironolactone (ALDACTONE) 50 MG tablet Take 3 tablets (150 mg) by mouth daily (Patient taking differently: Take 100 mg by mouth daily ) 60 tablet 1     Thiamine HCl (B-1) 100 MG TABS Take 1 tablet by mouth daily 90 tablet 3     vitamin D3 (CHOLECALCIFEROL) 2000 units (50 mcg) tablet Take 1 tablet by mouth daily        ALLERGIES  Prednisone; Trazodone; Benadryl [diphenhydramine]; and Oxycodone      Assessment/Plan:  # recurrent kidney stones  # CKD (creatinine >1 likely reflects chronic kidney disease in pt with cirrhosis  # sodium retention/hypokalemia - per patient, no edema.  Defer diuretic management to hepatology.  Last potassium level 4 so I suggested to Ivan that he should continue potassium supplement.  Serum bicarb 25, continue sodium bicarbonate    Phone call duration: 1:06- 1:13 (7 minutes)    Follow up prn worsening kidney function or worsening kidney stones    Alma Moses MD

## 2020-04-02 ENCOUNTER — PATIENT OUTREACH (OUTPATIENT)
Dept: GASTROENTEROLOGY | Facility: CLINIC | Age: 55
End: 2020-04-02

## 2020-04-02 RX ORDER — ALBUMIN (HUMAN) 12.5 G/50ML
12.5 SOLUTION INTRAVENOUS
Status: CANCELLED | OUTPATIENT
Start: 2020-04-02

## 2020-04-02 RX ORDER — HEPARIN SODIUM,PORCINE 10 UNIT/ML
5 VIAL (ML) INTRAVENOUS
Status: CANCELLED | OUTPATIENT
Start: 2020-04-02

## 2020-04-02 RX ORDER — LIDOCAINE HYDROCHLORIDE 10 MG/ML
20 INJECTION, SOLUTION EPIDURAL; INFILTRATION; INTRACAUDAL; PERINEURAL ONCE
Status: CANCELLED | OUTPATIENT
Start: 2020-04-02

## 2020-04-02 RX ORDER — HEPARIN SODIUM (PORCINE) LOCK FLUSH IV SOLN 100 UNIT/ML 100 UNIT/ML
5 SOLUTION INTRAVENOUS
Status: CANCELLED | OUTPATIENT
Start: 2020-04-02

## 2020-04-03 ENCOUNTER — TELEPHONE (OUTPATIENT)
Dept: INTERVENTIONAL RADIOLOGY/VASCULAR | Facility: CLINIC | Age: 55
End: 2020-04-03

## 2020-04-03 NOTE — TELEPHONE ENCOUNTER
NALINI for patient regarding r/s appointment to a telephone visit on 5/11 with  at 1:00.    5/4  Labs at 7:00    5/11   Telephone visit Dr. Tristan at 1:00    Itinerary sent, call back number was provided.    Citlali Naqvi LPN

## 2020-04-07 NOTE — PROGRESS NOTES
Patient left message asking for call back to discuss more questions he has. Returned call, there was no answer, left message with call back number.

## 2020-04-09 ENCOUNTER — PATIENT OUTREACH (OUTPATIENT)
Dept: GASTROENTEROLOGY | Facility: CLINIC | Age: 55
End: 2020-04-09

## 2020-04-09 DIAGNOSIS — K70.31 ALCOHOLIC CIRRHOSIS OF LIVER WITH ASCITES (H): ICD-10-CM

## 2020-04-10 RX ORDER — FUROSEMIDE 20 MG
20 TABLET ORAL DAILY
Qty: 90 TABLET | Refills: 3 | Status: SHIPPED | OUTPATIENT
Start: 2020-04-10 | End: 2020-08-24

## 2020-04-17 ENCOUNTER — VIRTUAL VISIT (OUTPATIENT)
Dept: FAMILY MEDICINE | Facility: CLINIC | Age: 55
End: 2020-04-17
Payer: COMMERCIAL

## 2020-04-17 DIAGNOSIS — J30.2 SEASONAL ALLERGIC RHINITIS, UNSPECIFIED TRIGGER: Primary | ICD-10-CM

## 2020-04-17 DIAGNOSIS — K70.31 ALCOHOLIC CIRRHOSIS OF LIVER WITH ASCITES (H): ICD-10-CM

## 2020-04-17 DIAGNOSIS — N18.2 CKD (CHRONIC KIDNEY DISEASE) STAGE 2, GFR 60-89 ML/MIN: ICD-10-CM

## 2020-04-17 DIAGNOSIS — N20.0 NEPHROLITHIASIS: ICD-10-CM

## 2020-04-17 PROCEDURE — 99214 OFFICE O/P EST MOD 30 MIN: CPT | Mod: 95 | Performed by: PHYSICIAN ASSISTANT

## 2020-04-17 RX ORDER — FEXOFENADINE HCL 60 MG/1
60 TABLET, FILM COATED ORAL DAILY
Qty: 30 TABLET | Refills: 1 | Status: ON HOLD | OUTPATIENT
Start: 2020-04-17 | End: 2023-09-19

## 2020-04-17 RX ORDER — IPRATROPIUM BROMIDE 42 UG/1
2 SPRAY, METERED NASAL 4 TIMES DAILY
Qty: 15 ML | Refills: 1 | Status: SHIPPED | OUTPATIENT
Start: 2020-04-17 | End: 2020-05-04

## 2020-04-17 NOTE — PROGRESS NOTES
"Ivan Bell is a 54 year old male who is being evaluated via a billable telephone visit.      The patient has been notified of following:     \"This telephone visit will be conducted via a call between you and your physician/provider. We have found that certain health care needs can be provided without the need for a physical exam.  This service lets us provide the care you need with a short phone conversation.  If a prescription is necessary we can send it directly to your pharmacy.  If lab work is needed we can place an order for that and you can then stop by our lab to have the test done at a later time.    Telephone visits are billed at different rates depending on your insurance coverage. During this emergency period, for some insurers they may be billed the same as an in-person visit.  Please reach out to your insurance provider with any questions.    If during the course of the call the physician/provider feels a telephone visit is not appropriate, you will not be charged for this service.\"    Patient has given verbal consent for Telephone visit?  No *Declined video visit as he does not want to download the louise*    How would you like to obtain your AVS? Frances Malave     Ivan Bell is a 54 year old male who presents to clinic today for the following health issues:    ENT Symptoms             Symptoms: cc Present Absent Comment   Fever/Chills   x    Fatigue  x  Increased   Muscle Aches   x    Eye Irritation  x  Bilateral-itching intermittently   Sneezing       Nasal Tyrone/Drg  x  Congested   Sinus Pressure/Pain  x  Orbital   Loss of smell   x    Dental pain   x    Sore Throat   x    Swollen Glands   x    Ear Pain/Fullness   x    Cough    Occasional productive cough (particularly in AM)   Wheeze   x    Chest Pain   x    Shortness of breath   x    Rash   x    Other  x  HA's- occipital behind both     Symptom duration:  4 days- HA   Symptom severity:  moderate   Treatments tried:  OTC cough medicine "   Contacts:  None     Ally DeShong CMA    Sneezing started 4 days ago.  Usually with allergies in the Spring and Fall.   Does not take any anti-histamines.     Has used nasal sprays a few years ago (flonase), however this was not helpful.     No fevers or changes in breathing.     Upon further questioning, he does report difficulty breathing at night due to mucus build up in sinuses and dry mouth/throat because he has to breath through his mouth.  No daytime symptoms.      He does have a h/o alcoholic cirrhosis of liver with ascites and is managed by a gastroenterologist.      He also has a h/o recurrent kidney stones, hydronephrosis and CKD (per nephrology note on 3/24/20 creatinine > 1 likely reflects CKD in patient with cirrhosis).  Managed by nephrologist.         Patient Active Problem List   Diagnosis     Hypertriglyceridemia     Gouty arthropathy     Erectile dysfunction     CARDIOVASCULAR SCREENING; LDL GOAL LESS THAN 130     24 hour contact given to patient      Hypertension goal BP (blood pressure) < 140/90     Alcoholic cirrhosis of liver with ascites (H)     Calculus of gallbladder without cholecystitis     Nephrolithiasis     Decompensation of cirrhosis of liver (H)     Hypokalemia     CKD (chronic kidney disease) stage 2, GFR 60-89 ml/min     Seasonal allergic rhinitis, unspecified trigger     Past Surgical History:   Procedure Laterality Date     ANKLE SURGERY Left      COLONOSCOPY N/A 3/31/2016    Procedure: COLONOSCOPY;  Surgeon: Rhys Uriostegui MD;  Location:  GI     ESOPHAGOSCOPY, GASTROSCOPY, DUODENOSCOPY (EGD), COMBINED N/A 3/31/2016    Procedure: COMBINED ESOPHAGOSCOPY, GASTROSCOPY, DUODENOSCOPY (EGD);  Surgeon: Rhys Uriostegui MD;  Location:  GI     ESOPHAGOSCOPY, GASTROSCOPY, DUODENOSCOPY (EGD), COMBINED N/A 3/9/2018    Procedure: COMBINED ESOPHAGOSCOPY, GASTROSCOPY, DUODENOSCOPY (EGD), BIOPSY SINGLE OR MULTIPLE;  EGD;  Surgeon: Gonzalo Wahl MD;   Location:  GI     ESOPHAGOSCOPY, GASTROSCOPY, DUODENOSCOPY (EGD), COMBINED N/A 2019    Procedure: ESOPHAGOGASTRODUODENOSCOPY (EGD);  Surgeon: Gonzalo Wahl MD;  Location:  GI     IR PARACENTESIS  10/29/2019     IR TRANSVEN INTRAHEPATIC PORTOSYST REV  10/29/2019     KNEE SURGERY Left      KNEE SURGERY Right      SIGMOIDOSCOPY FLEXIBLE N/A 10/31/2017    Procedure: SIGMOIDOSCOPY FLEXIBLE;;  Surgeon: Armaan Adams MD;  Location:  GI     TIPS Procedure  2018       Social History     Tobacco Use     Smoking status: Former Smoker     Packs/day: 0.00     Types: Dip, chew, snus or snuff     Last attempt to quit: 1998     Years since quittin.6     Smokeless tobacco: Current User     Tobacco comment: 1 tin per 10 days.   Substance Use Topics     Alcohol use: No     Alcohol/week: 17.5 standard drinks     Types: 21 Cans of beer per week     Comment: last etoh 14, did have Odouls ~2017     Family History   Problem Relation Age of Onset     Family History Negative Mother      Family History Negative Father      Hypertension Father      Cerebrovascular Disease Father 87     Breast Cancer Maternal Grandmother      Rheumatoid Arthritis Daughter      Depression Daughter      Cancer - colorectal No family hx of      Prostate Cancer No family hx of      Liver Disease No family hx of          Current Outpatient Medications   Medication Sig Dispense Refill     fexofenadine (ALLEGRA) 60 MG tablet Take 1 tablet (60 mg) by mouth daily 30 tablet 1     ipratropium (ATROVENT) 0.06 % nasal spray Spray 2 sprays into both nostrils 4 times daily 15 mL 1     Ascorbic Acid (VITAMIN C PO) Take by mouth daily       CONSTULOSE 10 GM/15ML solution TAKE 30MLS BY MOUTH THREE TIMES DAILY AS NEEDED FOR CONSTIPATION (TAKE AS NEEDED TO MAINTAIN 3 TO 4 BOWEL MOVEMENTS DAILY) (Patient taking differently: 30 g in am and 15 g in pm) 1000 mL 11     furosemide (LASIX) 20 MG tablet Take 1 tablet (20 mg) by mouth daily 90  tablet 3     Menaquinone-7 (VITAMIN K2) 100 MCG CAPS Take 200 mcg by mouth daily        MULTIPLE VITAMINS PO Take 1 tablet by mouth daily       potassium chloride ER (K-DUR/KLOR-CON M) 20 MEQ CR tablet Take 2 tablets (40 mEq) by mouth daily 120 tablet 3     rifaximin (XIFAXAN) 550 MG TABS tablet Take 1 tablet (550 mg) by mouth 2 times daily 180 tablet 0     sertraline (ZOLOFT) 50 MG tablet Take 0.5 tablets (25 mg) by mouth daily 30 tablet 3     sodium bicarbonate 650 MG tablet Take 1 tablet (650 mg) by mouth 2 times daily 180 tablet 3     Thiamine HCl (B-1) 100 MG TABS Take 1 tablet by mouth daily 90 tablet 3     vitamin D3 (CHOLECALCIFEROL) 2000 units (50 mcg) tablet Take 1 tablet by mouth daily       BP Readings from Last 3 Encounters:   02/06/20 132/73   02/06/20 131/69   01/23/20 124/70    Wt Readings from Last 3 Encounters:   03/03/20 77.5 kg (170 lb 14.4 oz)   02/06/20 73.3 kg (161 lb 9.6 oz)   02/06/20 77.3 kg (170 lb 8 oz)                    Reviewed and updated as needed this visit by Provider         Review of Systems   ROS COMP: Constitutional, HEENT, cardiovascular, pulmonary, gi and gu systems are negative, except as otherwise noted.       Objective   Reported vitals:  There were no vitals taken for this visit.   healthy, alert and no distress  PSYCH: Alert and oriented times 3; coherent speech, normal   rate and volume, able to articulate logical thoughts, able   to abstract reason, no tangential thoughts, no hallucinations   or delusions  His affect is normal  RESP: No cough, no audible wheezing, able to talk in full sentences  Remainder of exam unable to be completed due to telephone visits    Diagnostic Test Results:  Labs reviewed in Epic        Assessment/Plan:  1. Seasonal allergic rhinitis, unspecified trigger  Allergic Rhinitis    Discussed the nature and general treatment of allergies including avoidance of allergen triggers and medications.    Discussed the use of antihistamines, nasal  steroid sprays and atrovent. See orders in [facility] care    He prefers to try atrovent.  No renal or hepatic dosing adjustment needed for this.     Will also start an oral anti-histamine, renal dosing adjustment reviewed and will use 60 mg daily.     We discussed signs and symptoms on a sinus infection, if he develops worsening pain in his sinuses, pain in his gums or teeth, or any fevers, he is to notify clinic and we would consider starting an antibiotic at that time (it is early enough today, that I do not think that is necessary).     We also discussed the possibility of COVID-19, as it is difficult to differentiate allergy symptoms vs viral URI's vs Covid-19 at this time and testing is not indicated (as he is stable).      Regardless, I suggest he err on the side of caution and self-isolate for 7 days.  If he must go out in the public, then wear a mask.  Research has shown that controlling allergy symptoms at this time of year is very important to help with his bodies response in case he does come in contact with Covid-19.       - ipratropium (ATROVENT) 0.06 % nasal spray; Spray 2 sprays into both nostrils 4 times daily  Dispense: 15 mL; Refill: 1  - fexofenadine (ALLEGRA) 60 MG tablet; Take 1 tablet (60 mg) by mouth daily  Dispense: 30 tablet; Refill: 1    2. Alcoholic cirrhosis of liver with ascites (H)  Labs reviewed and stable.  No dosing adjustments needed.  Managed by specialist.     3. Nephrolithiasis  Labs reviewed and stable.  Renal dosing adjustment for allegra.  Managed by specialist.     4. CKD (chronic kidney disease) stage 2, GFR 60-89 ml/min  Labs reviewed and stable.  Renal dosing adjustment for allegra.  Managed by specialist.       Return in about 1 week (around 4/24/2020) for a recheck if symptoms do not improve (mychart or phone call ok).   Start: 9:56 am   End: 10:04 am    Phone call duration:  8 minutes    Citlali Morgan PA-C

## 2020-04-21 ENCOUNTER — PATIENT OUTREACH (OUTPATIENT)
Dept: GASTROENTEROLOGY | Facility: CLINIC | Age: 55
End: 2020-04-21

## 2020-04-21 ENCOUNTER — TELEPHONE (OUTPATIENT)
Dept: FAMILY MEDICINE | Facility: CLINIC | Age: 55
End: 2020-04-21

## 2020-04-21 DIAGNOSIS — K70.31 ALCOHOLIC CIRRHOSIS OF LIVER WITH ASCITES (H): Primary | ICD-10-CM

## 2020-04-21 DIAGNOSIS — J01.90 ACUTE SINUSITIS WITH SYMPTOMS > 10 DAYS: Primary | ICD-10-CM

## 2020-04-21 RX ORDER — DOXYCYCLINE 100 MG/1
100 CAPSULE ORAL 2 TIMES DAILY
Qty: 20 CAPSULE | Refills: 0 | Status: SHIPPED | OUTPATIENT
Start: 2020-04-21 | End: 2020-05-04

## 2020-04-21 RX ORDER — SPIRONOLACTONE 25 MG/1
50 TABLET ORAL DAILY
COMMUNITY
Start: 2020-04-21 | End: 2020-05-13

## 2020-04-21 NOTE — TELEPHONE ENCOUNTER
Patient had a virtual visit on 4.17.2020 for sinus infection. He was advised to use atrovent and allegra and follow up on 4.24.2020 if no improvement.  Patient has not noticed much of a change in symptoms. He did say the bloody noses and sneezing episodes are worse. Continues to have congestion, headache, pain above eyes, and drainage. No temp. Will route to provider.  Griselda Rush RN

## 2020-04-21 NOTE — TELEPHONE ENCOUNTER
Reason for call:  Patient reporting a symptom    Symptom or request: Patient had virtual visit with MAYITO Morgan on 04/17 for sinus inf.  Patient states that he is still not feeling well and sinus inf is not clearing up.  Asking what else he should be doing?    Have you been treated for this before? Yes    Additional comments: [facility]    Phone Number patient can be reached at:  Home number on file [phone]    Best Time:  Any    Can we leave a detailed message on this number:  YES    Call taken on 4/21/2020 at 9:07 AM by Courtney Saab

## 2020-04-21 NOTE — TELEPHONE ENCOUNTER
Please call patient, script for doxy BID x 10 days sent to [facility] pharmacy  Citlali Morgan PA-C

## 2020-04-24 ENCOUNTER — PATIENT OUTREACH (OUTPATIENT)
Dept: GASTROENTEROLOGY | Facility: CLINIC | Age: 55
End: 2020-04-24

## 2020-04-24 NOTE — PROGRESS NOTES
Patient's wife left message regarding patient having sudden onset abdominal pain, and questioning if this could be result of new antibiotic patient started for sinus infection. Returned call, there was no answer. Left detailed message with general information that sometimes antibiotics can cause abdominal symptoms of cramping and loose stools. Sudden and intense abdominal pain would be uncommon and something he should get evaluated, especially if it continues or he develops other symptoms such as nausea/vomiting, fever, etc. Suggested they check in with the doctor who prescribed the antibiotic if symptoms persist. Call back number for writer along with call center number provided.

## 2020-04-29 ENCOUNTER — PATIENT OUTREACH (OUTPATIENT)
Dept: GASTROENTEROLOGY | Facility: CLINIC | Age: 55
End: 2020-04-29

## 2020-04-29 DIAGNOSIS — K70.31 ALCOHOLIC CIRRHOSIS OF LIVER WITH ASCITES (H): ICD-10-CM

## 2020-04-29 DIAGNOSIS — K70.31 ALCOHOLIC CIRRHOSIS OF LIVER WITH ASCITES (H): Primary | ICD-10-CM

## 2020-04-29 LAB
ALBUMIN SERPL-MCNC: 2.3 G/DL (ref 3.4–5)
ALP SERPL-CCNC: 137 U/L (ref 40–150)
ALT SERPL W P-5'-P-CCNC: 18 U/L (ref 0–70)
ANION GAP SERPL CALCULATED.3IONS-SCNC: 4 MMOL/L (ref 3–14)
AST SERPL W P-5'-P-CCNC: 47 U/L (ref 0–45)
BILIRUB DIRECT SERPL-MCNC: 1.1 MG/DL (ref 0–0.2)
BILIRUB SERPL-MCNC: 2.4 MG/DL (ref 0.2–1.3)
BUN SERPL-MCNC: 10 MG/DL (ref 7–30)
CALCIUM SERPL-MCNC: 8.7 MG/DL (ref 8.5–10.1)
CHLORIDE SERPL-SCNC: 115 MMOL/L (ref 94–109)
CO2 SERPL-SCNC: 24 MMOL/L (ref 20–32)
CREAT SERPL-MCNC: 1.33 MG/DL (ref 0.66–1.25)
ERYTHROCYTE [DISTWIDTH] IN BLOOD BY AUTOMATED COUNT: 18.8 % (ref 10–15)
GFR SERPL CREATININE-BSD FRML MDRD: 59 ML/MIN/{1.73_M2}
GLUCOSE SERPL-MCNC: 78 MG/DL (ref 70–99)
HCT VFR BLD AUTO: 34.7 % (ref 40–53)
HGB BLD-MCNC: 11.2 G/DL (ref 13.3–17.7)
INR PPP: 1.99 (ref 0.86–1.14)
MCH RBC QN AUTO: 33.3 PG (ref 26.5–33)
MCHC RBC AUTO-ENTMCNC: 32.3 G/DL (ref 31.5–36.5)
MCV RBC AUTO: 103 FL (ref 78–100)
PLATELET # BLD AUTO: 53 10E9/L (ref 150–450)
POTASSIUM SERPL-SCNC: 5 MMOL/L (ref 3.4–5.3)
PROT SERPL-MCNC: 5.5 G/DL (ref 6.8–8.8)
RBC # BLD AUTO: 3.36 10E12/L (ref 4.4–5.9)
SODIUM SERPL-SCNC: 143 MMOL/L (ref 133–144)
WBC # BLD AUTO: 3.2 10E9/L (ref 4–11)

## 2020-04-29 PROCEDURE — 85610 PROTHROMBIN TIME: CPT | Performed by: INTERNAL MEDICINE

## 2020-04-29 PROCEDURE — 80076 HEPATIC FUNCTION PANEL: CPT | Performed by: INTERNAL MEDICINE

## 2020-04-29 PROCEDURE — 85027 COMPLETE CBC AUTOMATED: CPT | Performed by: INTERNAL MEDICINE

## 2020-04-29 PROCEDURE — 80048 BASIC METABOLIC PNL TOTAL CA: CPT | Performed by: INTERNAL MEDICINE

## 2020-04-29 PROCEDURE — 36415 COLL VENOUS BLD VENIPUNCTURE: CPT | Performed by: INTERNAL MEDICINE

## 2020-05-04 ENCOUNTER — VIRTUAL VISIT (OUTPATIENT)
Dept: GASTROENTEROLOGY | Facility: CLINIC | Age: 55
End: 2020-05-04
Attending: INTERNAL MEDICINE
Payer: COMMERCIAL

## 2020-05-04 DIAGNOSIS — K70.31 ALCOHOLIC CIRRHOSIS OF LIVER WITH ASCITES (H): Primary | ICD-10-CM

## 2020-05-04 RX ORDER — ACETAMINOPHEN 500 MG
1000 TABLET ORAL EVERY 6 HOURS PRN
COMMUNITY

## 2020-05-04 ASSESSMENT — PAIN SCALES - GENERAL: PAINLEVEL: NO PAIN (0)

## 2020-05-04 NOTE — PROGRESS NOTES
"Ivan Bell is a 54 year old male who is being evaluated via a billable telephone visit.      The patient has been notified of following:     \"This telephone visit will be conducted via a call between you and your physician/provider. We have found that certain health care needs can be provided without the need for a physical exam.  This service lets us provide the care you need with a short phone conversation.  If a prescription is necessary we can send it directly to your pharmacy.  If lab work is needed we can place an order for that and you can then stop by our lab to have the test done at a later time.    Telephone visits are billed at different rates depending on your insurance coverage. During this emergency period, for some insurers they may be billed the same as an in-person visit.  Please reach out to your insurance provider with any questions.    If during the course of the call the physician/provider feels a telephone visit is not appropriate, you will not be charged for this service.\"    Patient has given verbal consent for Telephone visit?  Yes    What phone number would you like to be contacted at? [phone]    How would you like to obtain your AVS? Frances    Phone call duration: 21 minutes    ____________________________________________________________________________________________    Telephone visit for cirrhosis.    Cirrhosis  - ETOH  - hx ascites, TIPS placement 5/14/18, 6/6/18, TIPS revision 10/29/19  - hx HE  - hx variceal bleed Oct 2017  - ETOH hepatitis March 2019  - last EGD Jun 2019- dimin EV, mild portal hypertensive gastropathy  - HCC screening- liver TIPS doppler U/S Jan 2020    Last visit with PA Feb 2020.  Patient was admitted to hospital in Jan 2020 with hypokalemia.  We tried to taper off his diuretics due to renal function and gynecomastia but BESSY recurred within 2 weeks. Patient now taking lasix 20 and spironolactone 50.  He was recently on doxycycline for a sinus infection " which he stopped after a few days as he had nausea.    Patient is well today.  He has no issues with ascites, BESSY or gynecomastia.  Last paracentesis in Feb 2020.  Sinus infection fully resolved.    Patient is taking lactulose 1x per day.  Bowels are moving 2-3 times per day.  No confusion.    Patient denies jaundice, itch, melena, hematemesis or hematochezia.    Patient last drank alcohol in Feb 2019.  He had scheduled a visit for ?counseling at the  which has been postponed due to COVID-19.    Labs reviewed.  Imaging reviewed.    Assessment  54 year old male with decompensated alcoholic cirrhosis complicated with ascites and hepatic encephalopathy.  MELD-Na= 20 (stable).    Last ETOH= Jan/Feb 2019.  Ascites well-controlled on low dose diuretics and TIPS.  Hepatic encephalopathy well-controlled.  Up to date with HCC screening.    Plan  1.  Complete abstinence from alcohol  2.  Start counseling as planned after COVID restrictions lifted  3.  Low Na diet  4.  Continue furosemide 20mg  5.  Continue spironolactone 50mg  6.  Continue lactulose and rifaximin  7.  Check CMP, INR, CBC in 3 months  8.  Follow-up with me in 6 months    Gonzalo Bales MD  Hepatology  [facility]

## 2020-05-04 NOTE — PATIENT INSTRUCTIONS
Plan  1.  No alcohol  2.  No change to medications  3.  Check blood work in 3 months  4.  Reschedule alcohol counselor appointment when COVID-19 restrictions lifted  5.  Follow-up with me in 6 months    Gonzalo Bales MD  Hepatology  [facility]

## 2020-05-11 ENCOUNTER — VIRTUAL VISIT (OUTPATIENT)
Dept: VASCULAR SURGERY | Facility: CLINIC | Age: 55
End: 2020-05-11
Payer: COMMERCIAL

## 2020-05-11 ENCOUNTER — TELEPHONE (OUTPATIENT)
Dept: INTERVENTIONAL RADIOLOGY/VASCULAR | Facility: CLINIC | Age: 55
End: 2020-05-11

## 2020-05-11 DIAGNOSIS — K70.31 ALCOHOLIC CIRRHOSIS OF LIVER WITH ASCITES (H): Primary | ICD-10-CM

## 2020-05-11 ASSESSMENT — PAIN SCALES - GENERAL: PAINLEVEL: NO PAIN (0)

## 2020-05-11 NOTE — PROGRESS NOTES
"Ivan Bell is a 54 year old male who is being evaluated via a billable telephone visit.      The patient has been notified of following:     \"This telephone visit will be conducted via a call between you and your physician/provider. We have found that certain health care needs can be provided without the need for a physical exam.  This service lets us provide the care you need with a short phone conversation.  If a prescription is necessary we can send it directly to your pharmacy.  If lab work is needed we can place an order for that and you can then stop by our lab to have the test done at a later time.    Telephone visits are billed at different rates depending on your insurance coverage. During this emergency period, for some insurers they may be billed the same as an in-person visit.  Please reach out to your insurance provider with any questions.    If during the course of the call the physician/provider feels a telephone visit is not appropriate, you will not be charged for this service.\"    Patient has given verbal consent for Telephone visit?  Yes    What phone number would you like to be contacted at? [phone]    How would you like to obtain your AVS? MyChart     SUBJECTIVE: Mr. 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  Unfortunately, he had a EtOH relapse in 2/2019, but has again been sober since.   He underwent a TIPS revision on 10/29/2019 for recurrent ascites, and need for paracentesis has decreased since then. His last paracentesis was 3 months ago.  No confusion, throwing up blood or swelling in legs. He is abstaining from alcohol.     Patient Active Problem List   Diagnosis     Hypertriglyceridemia     Gouty arthropathy     Erectile dysfunction     CARDIOVASCULAR SCREENING; LDL GOAL LESS THAN 130     24 hour contact given to patient      Hypertension goal BP (blood pressure) < 140/90     Alcoholic cirrhosis " of liver with ascites (H)     Calculus of gallbladder without cholecystitis     Nephrolithiasis     Decompensation of cirrhosis of liver (H)     Hypokalemia     CKD (chronic kidney disease) stage 2, GFR 60-89 ml/min     Seasonal allergic rhinitis, unspecified trigger      Current Outpatient Medications   Medication Sig     acetaminophen (TYLENOL) 500 MG tablet Take 1,000 mg by mouth every 6 hours as needed for mild pain     Ascorbic Acid (VITAMIN C PO) Take by mouth daily     CONSTULOSE 10 GM/15ML solution TAKE 30MLS BY MOUTH THREE TIMES DAILY AS NEEDED FOR CONSTIPATION (TAKE AS NEEDED TO MAINTAIN 3 TO 4 BOWEL MOVEMENTS DAILY) (Patient taking differently: Take 20 g by mouth daily )     fexofenadine (ALLEGRA) 60 MG tablet Take 1 tablet (60 mg) by mouth daily     furosemide (LASIX) 20 MG tablet Take 1 tablet (20 mg) by mouth daily     Menaquinone-7 (VITAMIN K2) 100 MCG CAPS Take 200 mcg by mouth daily      MULTIPLE VITAMINS PO Take 1 tablet by mouth daily     potassium chloride ER (K-DUR/KLOR-CON M) 20 MEQ CR tablet Take 2 tablets (40 mEq) by mouth daily     rifaximin (XIFAXAN) 550 MG TABS tablet Take 1 tablet (550 mg) by mouth 2 times daily     sertraline (ZOLOFT) 50 MG tablet Take 0.5 tablets (25 mg) by mouth daily     sodium bicarbonate 650 MG tablet Take 1 tablet (650 mg) by mouth 2 times daily     spironolactone (ALDACTONE) 25 MG tablet Take 2 tablets (50 mg) by mouth daily     Thiamine HCl (B-1) 100 MG TABS Take 1 tablet by mouth daily     vitamin D3 (CHOLECALCIFEROL) 2000 units (50 mcg) tablet Take 1 tablet by mouth daily     No current facility-administered medications for this visit.      Social History     Tobacco Use     Smoking status: Former Smoker     Packs/day: 0.00     Types: Dip, chew, snus or snuff     Last attempt to quit: 1998     Years since quittin.7     Smokeless tobacco: Current User     Types: Chew     Tobacco comment: 1 tin per 10 days.   Substance Use Topics     Alcohol use: No      Alcohol/week: 17.5 standard drinks     Types: 21 Cans of beer per week     Comment: last etoh 2/14/16, did have Odouls ~8/2017     Drug use: No      REVIEW OF SYSTEMS: As above    OBJECTIVE:  AOx 3  Normal affect    Imaging: None pertinent since     ASSESSMENT AND PLAN: Patient with EtOH cirrhosis, status post TIPS placement 6/6/2018 for refractory ascites, with alcohol relapse and recurrence of ascites in 2019, TIPS revision on 10/29/2019, decreased need for paracentesis since then. Last paracentesis 3 months ago     -Continue follow up with Dr. Bales, if he starts building fluid in abdomen, he was asked to give us a call    Phone call duration: 10 minutes    Jelani Tristan MD

## 2020-05-11 NOTE — NURSING NOTE
Vascular Rooming Note     Ivan Bell's goals for this visit include:   Chief Complaint   Patient presents with     VISHNU Love, is participating in a telephone visit today for a follow up TIPS, feeling good and weight has been stable, no concerns at this time, as reported by patient.     Citlali Naqvi LPN

## 2020-05-11 NOTE — TELEPHONE ENCOUNTER
LM for patient regarding  telephone visit at 1:00 with .    I will call back 5mins.    Call back number was provided.

## 2020-05-13 ENCOUNTER — PATIENT OUTREACH (OUTPATIENT)
Dept: GASTROENTEROLOGY | Facility: CLINIC | Age: 55
End: 2020-05-13

## 2020-05-13 DIAGNOSIS — K70.31 ALCOHOLIC CIRRHOSIS OF LIVER WITH ASCITES (H): ICD-10-CM

## 2020-05-13 RX ORDER — SPIRONOLACTONE 50 MG/1
50 TABLET, FILM COATED ORAL DAILY
Qty: 90 TABLET | Refills: 3 | Status: SHIPPED | OUTPATIENT
Start: 2020-05-13 | End: 2021-08-09

## 2020-05-13 NOTE — PROGRESS NOTES
Patient needing refill for spironolactone 50 mg. This was ordered and sent to [facility]. Patient has no further issues or concerns to report.

## 2020-05-27 ENCOUNTER — ANCILLARY PROCEDURE (OUTPATIENT)
Dept: GENERAL RADIOLOGY | Facility: CLINIC | Age: 55
End: 2020-05-27
Attending: NURSE PRACTITIONER
Payer: COMMERCIAL

## 2020-05-27 ENCOUNTER — OFFICE VISIT (OUTPATIENT)
Dept: FAMILY MEDICINE | Facility: CLINIC | Age: 55
End: 2020-05-27
Payer: COMMERCIAL

## 2020-05-27 VITALS
RESPIRATION RATE: 16 BRPM | BODY MASS INDEX: 24.97 KG/M2 | HEART RATE: 84 BPM | DIASTOLIC BLOOD PRESSURE: 71 MMHG | WEIGHT: 174 LBS | TEMPERATURE: 98.1 F | SYSTOLIC BLOOD PRESSURE: 130 MMHG

## 2020-05-27 DIAGNOSIS — M19.041 ARTHRITIS OF RIGHT HAND: ICD-10-CM

## 2020-05-27 DIAGNOSIS — M65.341 TRIGGER RING FINGER OF RIGHT HAND: Primary | ICD-10-CM

## 2020-05-27 LAB
CRP SERPL-MCNC: <2.9 MG/L (ref 0–8)
DIFFERENTIAL METHOD BLD: ABNORMAL
EOSINOPHIL # BLD AUTO: 0.7 10E9/L (ref 0–0.7)
EOSINOPHIL NFR BLD AUTO: 18 %
ERYTHROCYTE [DISTWIDTH] IN BLOOD BY AUTOMATED COUNT: 16.6 % (ref 10–15)
ERYTHROCYTE [SEDIMENTATION RATE] IN BLOOD BY WESTERGREN METHOD: 9 MM/H (ref 0–20)
HCT VFR BLD AUTO: 37 % (ref 40–53)
HGB BLD-MCNC: 12 G/DL (ref 13.3–17.7)
LYMPHOCYTES # BLD AUTO: 0.8 10E9/L (ref 0.8–5.3)
LYMPHOCYTES NFR BLD AUTO: 20 %
MCH RBC QN AUTO: 32.4 PG (ref 26.5–33)
MCHC RBC AUTO-ENTMCNC: 32.4 G/DL (ref 31.5–36.5)
MCV RBC AUTO: 100 FL (ref 78–100)
MONOCYTES # BLD AUTO: 0.3 10E9/L (ref 0–1.3)
MONOCYTES NFR BLD AUTO: 8 %
NEUTROPHILS # BLD AUTO: 2.3 10E9/L (ref 1.6–8.3)
NEUTROPHILS NFR BLD AUTO: 54 %
PLATELET # BLD AUTO: 59 10E9/L (ref 150–450)
PLATELET # BLD EST: ABNORMAL 10*3/UL
RBC # BLD AUTO: 3.7 10E12/L (ref 4.4–5.9)
RBC MORPH BLD: NORMAL
URATE SERPL-MCNC: 5.2 MG/DL (ref 3.5–7.2)
WBC # BLD AUTO: 4.1 10E9/L (ref 4–11)

## 2020-05-27 PROCEDURE — 85025 COMPLETE CBC W/AUTO DIFF WBC: CPT | Performed by: NURSE PRACTITIONER

## 2020-05-27 PROCEDURE — 73130 X-RAY EXAM OF HAND: CPT | Mod: RT

## 2020-05-27 PROCEDURE — 99213 OFFICE O/P EST LOW 20 MIN: CPT | Performed by: NURSE PRACTITIONER

## 2020-05-27 PROCEDURE — 85652 RBC SED RATE AUTOMATED: CPT | Performed by: NURSE PRACTITIONER

## 2020-05-27 PROCEDURE — 36415 COLL VENOUS BLD VENIPUNCTURE: CPT | Performed by: NURSE PRACTITIONER

## 2020-05-27 PROCEDURE — 84550 ASSAY OF BLOOD/URIC ACID: CPT | Performed by: NURSE PRACTITIONER

## 2020-05-27 PROCEDURE — 86140 C-REACTIVE PROTEIN: CPT | Performed by: NURSE PRACTITIONER

## 2020-05-27 RX ORDER — PREDNISONE 20 MG/1
20 TABLET ORAL DAILY
Qty: 5 TABLET | Refills: 0 | Status: SHIPPED | OUTPATIENT
Start: 2020-05-27 | End: 2020-06-03

## 2020-05-27 ASSESSMENT — ENCOUNTER SYMPTOMS
DIARRHEA: 0
FATIGUE: 0
NAUSEA: 0
SORE THROAT: 0
JOINT SWELLING: 1
EYE DISCHARGE: 0
FEVER: 0
WHEEZING: 0
SHORTNESS OF BREATH: 0
VOMITING: 0
COUGH: 0
HEADACHES: 0
SINUS PRESSURE: 0
RHINORRHEA: 0
DIAPHORESIS: 0

## 2020-05-27 ASSESSMENT — PAIN SCALES - GENERAL: PAINLEVEL: SEVERE PAIN (7)

## 2020-05-27 NOTE — PROGRESS NOTES
Ananda Bell is a 54 year old male who presents to clinic today for the following health issues:    HPI     Musculoskeletal problem/pain      Duration: 1 week    Description  Location: Index, middle and ring fingers on right hand. Pt is right handed    Intensity:  severe    Accompanying signs and symptoms: clicking and pain when he moves the fingers    History  Previous similar problem: no   Previous evaluation:  none    Precipitating or alleviating factors:  Trauma or overuse: YES- construction/concrete work  Aggravating factors include: grasping, moving fingers    Therapies tried and outcome: nothing    Current Outpatient Medications   Medication Sig Dispense Refill     acetaminophen (TYLENOL) 500 MG tablet Take 1,000 mg by mouth every 6 hours as needed for mild pain       Ascorbic Acid (VITAMIN C PO) Take by mouth daily       CONSTULOSE 10 GM/15ML solution TAKE 30MLS BY MOUTH THREE TIMES DAILY AS NEEDED FOR CONSTIPATION (TAKE AS NEEDED TO MAINTAIN 3 TO 4 BOWEL MOVEMENTS DAILY) (Patient taking differently: Take 20 g by mouth daily ) 1000 mL 11     fexofenadine (ALLEGRA) 60 MG tablet Take 1 tablet (60 mg) by mouth daily 30 tablet 1     furosemide (LASIX) 20 MG tablet Take 1 tablet (20 mg) by mouth daily 90 tablet 3     Menaquinone-7 (VITAMIN K2) 100 MCG CAPS Take 200 mcg by mouth daily        MULTIPLE VITAMINS PO Take 1 tablet by mouth daily       potassium chloride ER (K-DUR/KLOR-CON M) 20 MEQ CR tablet Take 2 tablets (40 mEq) by mouth daily 120 tablet 3     predniSONE (DELTASONE) 20 MG tablet Take 1 tablet (20 mg) by mouth daily 5 tablet 0     rifaximin (XIFAXAN) 550 MG TABS tablet Take 1 tablet (550 mg) by mouth 2 times daily 180 tablet 0     sertraline (ZOLOFT) 50 MG tablet Take 0.5 tablets (25 mg) by mouth daily 30 tablet 3     sodium bicarbonate 650 MG tablet Take 1 tablet (650 mg) by mouth 2 times daily 180 tablet 3     spironolactone (ALDACTONE) 50 MG tablet Take 1 tablet (50 mg) by mouth  daily 90 tablet 3     Thiamine HCl (B-1) 100 MG TABS Take 1 tablet by mouth daily 90 tablet 3     vitamin D3 (CHOLECALCIFEROL) 2000 units (50 mcg) tablet Take 1 tablet by mouth daily       Allergies   Allergen Reactions     Prednisone Visual Disturbance     Trazodone Visual Disturbance     Benadryl [Diphenhydramine] Other (See Comments)     Delirium (visual and auditory hallucinations)     Oxycodone Other (See Comments)     Delirium and constipation       Reviewed and updated as needed this visit by Provider         For the last week right hand has been hurting more. Is swollen, 2nd and 3rd digits are getting stuck at times. Will have to help them. No home over the counter meds for this. 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 Has never had this before. Is right handed. Does get some numbness and tingling at times. Will just be there. Does feel some numb right now. Has previous injury to right arm, tendons.       Objective    /71 (BP Location: Left arm, Patient Position: Sitting, Cuff Size: Adult Regular)   Pulse 84   Temp 98.1  F (36.7  C) (Tympanic)   Resp 16   Wt 78.9 kg (174 lb)   BMI 24.97 kg/m    Body mass index is 24.97 kg/m .          Assessment & Plan      Review of Systems   Constitutional: Negative for diaphoresis, fatigue and fever.   HENT: Negative for congestion, ear pain, rhinorrhea, sinus pressure and sore throat.    Eyes: Negative for discharge.   Respiratory: Negative for cough, shortness of breath and wheezing.    Cardiovascular: Negative for chest pain.   Gastrointestinal: Negative for diarrhea, nausea and vomiting.   Musculoskeletal: Positive for joint swelling (right hand).        Third finger to right hand will get stuck at times.   Neurological: Negative for headaches.       Physical Exam  Constitutional:       Appearance: He is well-developed.   Cardiovascular:      Rate and Rhythm: Normal rate and regular rhythm.      Heart sounds: Normal heart sounds.    Pulmonary:      Effort: Pulmonary effort is normal.      Breath sounds: Normal breath sounds.   Musculoskeletal:      Right hand: He exhibits decreased range of motion, tenderness, bony tenderness and swelling. Decreased strength noted.   Skin:     General: Skin is warm and dry.   Neurological:      Mental Status: He is alert.       1. Trigger ring finger of right hand  - Orthopedic & Spine  Referral; Future    2. Arthritis of right hand  We will check additional labs here today.  We will obtain imaging.  Short burst of prednisone.  Education given regarding overuse.  - XR Hand Right G/E 3 Views; Future  - Erythrocyte sedimentation rate auto  - CRP inflammation  - CBC with platelets differential  - Uric acid  - predniSONE (DELTASONE) 20 MG tablet; Take 1 tablet (20 mg) by mouth daily  Dispense: 5 tablet; Refill: 0      No follow-ups on file.    GILLIAN Pereyra [facility]

## 2020-05-27 NOTE — LETTER
June 2, 2020      Ivan Bell  [id number] [address] [id number]        Dear ,    We are writing to inform you of your test results.    Your blood test to look for gout is in normal range.     Your inflammatory markers are also in normal range.     Your blood counts remain stable.      Resulted Orders   Erythrocyte sedimentation rate auto   Result Value Ref Range    Sed Rate 9 0 - 20 mm/h   CRP inflammation   Result Value Ref Range    CRP Inflammation <2.9 0.0 - 8.0 mg/L   CBC with platelets differential   Result Value Ref Range    WBC 4.1 4.0 - 11.0 10e9/L    RBC Count 3.70 (L) 4.4 - 5.9 10e12/L    Hemoglobin 12.0 (L) 13.3 - 17.7 g/dL    Hematocrit 37.0 (L) 40.0 - 53.0 %     78 - 100 fl    MCH 32.4 26.5 - 33.0 pg    MCHC 32.4 31.5 - 36.5 g/dL    RDW 16.6 (H) 10.0 - 15.0 %    Platelet Count 59 (L) 150 - 450 10e9/L    % Neutrophils 54.0 %    % Lymphocytes 20.0 %    % Monocytes 8.0 %    % Eosinophils 18.0 %    Absolute Neutrophil 2.3 1.6 - 8.3 10e9/L    Absolute Lymphocytes 0.8 0.8 - 5.3 10e9/L    Absolute Monocytes 0.3 0.0 - 1.3 10e9/L    Absolute Eosinophils 0.7 0.0 - 0.7 10e9/L    RBC Morphology Normal     Platelet Estimate       Automated count confirmed.  Platelet morphology is abnormal.    Diff Method Automated Method    Uric acid   Result Value Ref Range    Uric Acid 5.2 3.5 - 7.2 mg/dL       If you have any questions or concerns, please call the clinic at the number listed above.       Sincerely,        Jenifer Forde, GILLIAN CNP/am

## 2020-05-28 ENCOUNTER — VIRTUAL VISIT (OUTPATIENT)
Dept: ORTHOPEDICS | Facility: CLINIC | Age: 55
End: 2020-05-28
Payer: COMMERCIAL

## 2020-05-28 VITALS — BODY MASS INDEX: 24.62 KG/M2 | HEIGHT: 70 IN | WEIGHT: 172 LBS

## 2020-05-28 DIAGNOSIS — M65.341 TRIGGER RING FINGER OF RIGHT HAND: ICD-10-CM

## 2020-05-28 DIAGNOSIS — M79.644 FINGER PAIN, RIGHT: Primary | ICD-10-CM

## 2020-05-28 PROCEDURE — 99213 OFFICE O/P EST LOW 20 MIN: CPT | Mod: 95 | Performed by: ORTHOPAEDIC SURGERY

## 2020-05-28 ASSESSMENT — PAIN SCALES - GENERAL: PAINLEVEL: SEVERE PAIN (6)

## 2020-05-28 ASSESSMENT — MIFFLIN-ST. JEOR: SCORE: 1616.44

## 2020-05-28 NOTE — PROGRESS NOTES
"Ivan Bell is a 54 year old male who is being evaluated via a billable telephone visit due to COVID-19.    The patient has been notified of following:     \"This telephone visit will be conducted via a call between you and your physician/provider. We have found that certain health care needs can be provided without the need for a physical exam.  This service lets us provide the care you need with a short phone conversation.  If a prescription is necessary we can send it directly to your pharmacy.  If lab work is needed we can place an order for that and you can then stop by our lab to have the test done at a later time.    Telephone visits are billed at different rates depending on your insurance coverage. During this emergency period, for some insurers they may be billed the same as an in-person visit.  Please reach out to your insurance provider with any questions.    If during the course of the call the physician/provider feels a telephone visit is not appropriate, you will not be charged for this service.\"    Patient has given verbal consent for Telephone visit?  Yes    What phone number would you like to be contacted at? [phone]    How would you like to obtain your AVS? Frances    CHIEF COMPLAINT:   Chief Complaint   Patient presents with     Finger     Right long and ring finger pain. Onset: comes and goes for about 2-3 weeks. He can't even turn his keys to start his vehicle. It has locking and snapping. His fingers are really sore in the morning.      Ivan Bell is seen today via telephone visit through the [facility] for evaluation of right long and ring finger pain at the request of GILLIAN Williamson CNP.      HISTORY:  Ivan Bell is a 54 year old male , Right -hand dominant who is seen in telephone consultation for Right hand pain and locking/snapping x 2-3  Weeks. Locates pain in the \"knuckles\".  The symptoms have been intermittent and not helped with 1 day " of oral prednisone. he has numbness and tingling in the tips of the long and ring fingers. Fingers are really sore in the morning. Hand/fingers swell. Can't grasp and hold on to things, can't stand the pain. States similar problem 3-4 years ago treated with cortisone without relief, he thinks at [place], but he can't recall exactly what he was diagnosed with at that time.     Works as a , self-employed.    Suspected cause: Due to unknown factors.    Pain severity: 6/10  Pain quality: dull, aching and stabbing  Frequency of symptoms: are constant.  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   Patient Active Problem List    Diagnosis Date Noted     CKD (chronic kidney disease) stage 2, GFR 60-89 ml/min 04/17/2020     Priority: Medium     Seasonal allergic rhinitis, unspecified trigger 04/17/2020     Priority: Medium     Hypokalemia 12/31/2019     Priority: Medium     Decompensation of cirrhosis of liver (H) 03/10/2019     Priority: Medium     Calculus of gallbladder without cholecystitis 09/23/2018     Priority: Medium     September 23, 2018 non-obstructing, incidental finding on us.        Nephrolithiasis 09/23/2018     Priority: Medium     September 23, 2018 non-obstructing, incident finding on us.        Alcoholic cirrhosis of liver with ascites (H) 03/08/2016     Priority: Medium     September 23, 2018 now sober, ascites resolved, S/P TIP procedure 6/2018. Normal liver enzymes, diminished liver function.  INR 1.6, bilirubin 2.5, albumin 2.6. He  appears healthy. Doing well. Stressed the importance of abstaining from alcohol. See copy of recent us.     9/10/18:  Impression:   1. Patent TIPS with appropriate in stent velocities. Normal Doppler  evaluation of the remainder  of the hepatic vasculature in the setting  of TIPS.  2. Cirrhotic hepatic morphology with evidence of portal hypertension,  including splenomegaly. No focal hepatic mass.  3. Cholelithiasis without evidence of cholecystitis.  4. Bilateral nonobstructing nephrolithiasis.            Hypertension goal BP (blood pressure) < 140/90 12/18/2012     Priority: Medium     24 hour contact given to patient  01/02/2012     Priority: Medium     EMERGENCY CARE PLAN  Presenting Problem Signs and Symptoms Treatment Plan    Questions or conerns during clinic hours    I will call the clinic directly     Questions or conerns outside clinic hours    I will call the 24 hour nurse line at [phone]    Patient needs to schedule an appointment    I will call the 24 hour scheduling team at [phone] or clinic directly    Same day treatment     I will call the clinic first, nurse line if after hours, urgent care and express care if needed                                 CARDIOVASCULAR SCREENING; LDL GOAL LESS THAN 130 10/31/2010     Priority: Medium     Erectile dysfunction 01/29/2008     Priority: Medium     September 23, 2018 no known CAD, no contraindications to sildenafil.        Gouty arthropathy 12/31/2006     Priority: Medium     Problem list name updated by automated process. Provider to review and confirm  [employer] Update utility       Hypertriglyceridemia 08/03/2005     Priority: Medium     Last Exam: December 19, 2007  Last Lipids: CHOL      211   01/25/2007 LDL      104   01/25/2007 HDL       83   01/25/2007  Last LFTs:: AST      106   01/25/2007   ALT       53   01/25/2007    TRIG      120   01/25/2007  TRIG      115   01/16/2006  Meds: Lopid 600 bid - tolerating           Surgical Hx:  has a past surgical history that includes Colonoscopy (N/A, 3/31/2016); knee surgery (Left); knee surgery (Right); Ankle surgery (Left); Esophagoscopy, gastroscopy, duodenoscopy (EGD), combined (N/A, 3/31/2016); Sigmoidoscopy flexible (N/A,  10/31/2017); Esophagoscopy, gastroscopy, duodenoscopy (EGD), combined (N/A, 3/9/2018); TIPS Procedure (06/06/2018); Esophagoscopy, gastroscopy, duodenoscopy (EGD), combined (N/A, 6/7/2019); IR Paracentesis (10/29/2019); and IR Transven Intrahepatic Portosyst Rev (10/29/2019).    Medications:   Current Outpatient Medications:      acetaminophen (TYLENOL) 500 MG tablet, Take 1,000 mg by mouth every 6 hours as needed for mild pain, Disp: , Rfl:      Ascorbic Acid (VITAMIN C PO), Take by mouth daily, Disp: , Rfl:      CONSTULOSE 10 GM/15ML solution, TAKE 30MLS BY MOUTH THREE TIMES DAILY AS NEEDED FOR CONSTIPATION (TAKE AS NEEDED TO MAINTAIN 3 TO 4 BOWEL MOVEMENTS DAILY) (Patient taking differently: Take 20 g by mouth daily ), Disp: 1000 mL, Rfl: 11     fexofenadine (ALLEGRA) 60 MG tablet, Take 1 tablet (60 mg) by mouth daily, Disp: 30 tablet, Rfl: 1     furosemide (LASIX) 20 MG tablet, Take 1 tablet (20 mg) by mouth daily, Disp: 90 tablet, Rfl: 3     Menaquinone-7 (VITAMIN K2) 100 MCG CAPS, Take 200 mcg by mouth daily , Disp: , Rfl:      MULTIPLE VITAMINS PO, Take 1 tablet by mouth daily, Disp: , Rfl:      potassium chloride ER (K-DUR/KLOR-CON M) 20 MEQ CR tablet, Take 2 tablets (40 mEq) by mouth daily, Disp: 120 tablet, Rfl: 3     predniSONE (DELTASONE) 20 MG tablet, Take 1 tablet (20 mg) by mouth daily, Disp: 5 tablet, Rfl: 0     rifaximin (XIFAXAN) 550 MG TABS tablet, Take 1 tablet (550 mg) by mouth 2 times daily, Disp: 180 tablet, Rfl: 0     sertraline (ZOLOFT) 50 MG tablet, Take 0.5 tablets (25 mg) by mouth daily, Disp: 30 tablet, Rfl: 3     sodium bicarbonate 650 MG tablet, Take 1 tablet (650 mg) by mouth 2 times daily, Disp: 180 tablet, Rfl: 3     spironolactone (ALDACTONE) 50 MG tablet, Take 1 tablet (50 mg) by mouth daily, Disp: 90 tablet, Rfl: 3     Thiamine HCl (B-1) 100 MG TABS, Take 1 tablet by mouth daily, Disp: 90 tablet, Rfl: 3     vitamin D3 (CHOLECALCIFEROL) 2000 units (50 mcg) tablet, Take 1 tablet by  "mouth daily, Disp: , Rfl:     Allergies:   Allergies   Allergen Reactions     Prednisone Visual Disturbance     Trazodone Visual Disturbance     Benadryl [Diphenhydramine] Other (See Comments)     Delirium (visual and auditory hallucinations)     Oxycodone Other (See Comments)     Delirium and constipation       Social Hx:  reports that he quit smoking about 21 years ago. His smoking use included dip, chew, snus or snuff. He smoked 0.00 packs per day. His smokeless tobacco use includes chew. He reports that he does not drink alcohol or use drugs.    Family Hx: family history includes Breast Cancer in his maternal grandmother; Cerebrovascular Disease (age of onset: 87) in his father; Depression in his daughter; Family History Negative in his father and mother; Hypertension in his father; Rheumatoid Arthritis in his daughter.    REVIEW OF SYSTEMS: 10 point ROS neg other than the symptoms noted above in the HPI and PMH. Notables include  CONSTITUTIONAL:NEGATIVE for fever, chills, change in weight  INTEGUMENTARY/SKIN: NEGATIVE for worrisome rashes, moles or lesions  MUSCULOSKELETAL:See HPI above  Neurology: see HPI above.      EXAM:  Ht 1.778 m (5' 10\")   Wt 78 kg (172 lb)   BMI 24.68 kg/m            ASSESSMENT: 53yo RHD male with right long/ring finger pain, swelling, locking, likely trigger finger, possible carpal tunnel syndrome.    PLAN:   * likely trigger finger to explain locking/snapping  * unclear of numbness and tingling, swelling of hands/fingers    If indeed trigger finger, we talked about the options: taping the PIP, splinting the PIP joint, corticosteroid injections, hand therapy and surgery. I explained the risks and benefits of each, including recurrence. I have explained the nature of the surgical procedure, the risks and recovery time with the patient.  * At this time, advised patient to make a face to face visit with us to further evaluate and try to confirm diagnosis, and appropriate treatment " options.    He was appreciative of the call.      Pascual Valencia M.D., M.S.  Dept. of Orthopaedic Surgery  [facility]      Phone call duration: 9:33 minutes:seconds.

## 2020-05-28 NOTE — LETTER
"    5/28/2020         RE: Ivan Bell  [address] [facility] [address]        Dear Colleague,    Thank you for referring your patient, Ivan Bell, to the [facility]. Please see a copy of my visit note below.    Ivan Bell is a 54 year old male who is being evaluated via a billable telephone visit due to COVID-19.    The patient has been notified of following:     \"This telephone visit will be conducted via a call between you and your physician/provider. We have found that certain health care needs can be provided without the need for a physical exam.  This service lets us provide the care you need with a short phone conversation.  If a prescription is necessary we can send it directly to your pharmacy.  If lab work is needed we can place an order for that and you can then stop by our lab to have the test done at a later time.    Telephone visits are billed at different rates depending on your insurance coverage. During this emergency period, for some insurers they may be billed the same as an in-person visit.  Please reach out to your insurance provider with any questions.    If during the course of the call the physician/provider feels a telephone visit is not appropriate, you will not be charged for this service.\"    Patient has given verbal consent for Telephone visit?  Yes    What phone number would you like to be contacted at? [phone]    How would you like to obtain your AVS? Frances    CHIEF COMPLAINT:   Chief Complaint   Patient presents with     Finger     Right long and ring finger pain. Onset: comes and goes for about 2-3 weeks. He can't even turn his keys to start his vehicle. It has locking and snapping. His fingers are really sore in the morning.      Ivan Bell is seen today via telephone visit through the [facility] for evaluation of right long and ring finger pain at the request of GILLIAN Williamson " "CNP.      HISTORY:  Ivna Bell is a 54 year old male , Right -hand dominant who is seen in telephone consultation for Right hand pain and locking/snapping x 2-3  Weeks. Locates pain in the \"knuckles\".  The symptoms have been intermittent and not helped with 1 day of oral prednisone. he has numbness and tingling in the tips of the long and ring fingers. Fingers are really sore in the morning. Hand/fingers swell. Can't grasp and hold on to things, can't stand the pain. States similar problem 3-4 years ago treated with cortisone without relief, he thinks at [place], but he can't recall exactly what he was diagnosed with at that time.     Works as a , self-employed.    Suspected cause: Due to unknown factors.    Pain severity: 6/10  Pain quality: dull, aching and stabbing  Frequency of symptoms: are constant.  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   Patient Active Problem List    Diagnosis Date Noted     CKD (chronic kidney disease) stage 2, GFR 60-89 ml/min 04/17/2020     Priority: Medium     Seasonal allergic rhinitis, unspecified trigger 04/17/2020     Priority: Medium     Hypokalemia 12/31/2019     Priority: Medium     Decompensation of cirrhosis of liver (H) 03/10/2019     Priority: Medium     Calculus of gallbladder without cholecystitis 09/23/2018     Priority: Medium     September 23, 2018 non-obstructing, incidental finding on us.        Nephrolithiasis 09/23/2018     Priority: Medium     September 23, 2018 non-obstructing, incident finding on us.        Alcoholic cirrhosis of liver with ascites (H) 03/08/2016     Priority: Medium     September 23, 2018 now sober, ascites resolved, S/P TIP procedure 6/2018. Normal liver enzymes, diminished liver function.  INR " 1.6, bilirubin 2.5, albumin 2.6. He  appears healthy. Doing well. Stressed the importance of abstaining from alcohol. See copy of recent us.     9/10/18:  Impression:   1. Patent TIPS with appropriate in stent velocities. Normal Doppler  evaluation of the remainder of the hepatic vasculature in the setting  of TIPS.  2. Cirrhotic hepatic morphology with evidence of portal hypertension,  including splenomegaly. No focal hepatic mass.  3. Cholelithiasis without evidence of cholecystitis.  4. Bilateral nonobstructing nephrolithiasis.            Hypertension goal BP (blood pressure) < 140/90 12/18/2012     Priority: Medium     24 hour contact given to patient  01/02/2012     Priority: Medium     EMERGENCY CARE PLAN  Presenting Problem Signs and Symptoms Treatment Plan    Questions or conerns during clinic hours    I will call the clinic directly     Questions or conerns outside clinic hours    I will call the 24 hour nurse line at [phone]    Patient needs to schedule an appointment    I will call the 24 hour scheduling team at [phone] or clinic directly    Same day treatment     I will call the clinic first, nurse line if after hours, urgent care and express care if needed                                 CARDIOVASCULAR SCREENING; LDL GOAL LESS THAN 130 10/31/2010     Priority: Medium     Erectile dysfunction 01/29/2008     Priority: Medium     September 23, 2018 no known CAD, no contraindications to sildenafil.        Gouty arthropathy 12/31/2006     Priority: Medium     Problem list name updated by automated process. Provider to review and confirm  Imo Update utility       Hypertriglyceridemia 08/03/2005     Priority: Medium     Last Exam: December 19, 2007  Last Lipids: CHOL      211   01/25/2007 LDL      104   01/25/2007 HDL       83   01/25/2007  Last LFTs:: AST      106   01/25/2007   ALT       53   01/25/2007    TRIG      120   01/25/2007  TRIG      115   01/16/2006  Meds: Lopid 600 bid - tolerating            Surgical Hx:  has a past surgical history that includes Colonoscopy (N/A, 3/31/2016); knee surgery (Left); knee surgery (Right); Ankle surgery (Left); Esophagoscopy, gastroscopy, duodenoscopy (EGD), combined (N/A, 3/31/2016); Sigmoidoscopy flexible (N/A, 10/31/2017); Esophagoscopy, gastroscopy, duodenoscopy (EGD), combined (N/A, 3/9/2018); TIPS Procedure (06/06/2018); Esophagoscopy, gastroscopy, duodenoscopy (EGD), combined (N/A, 6/7/2019); IR Paracentesis (10/29/2019); and IR Transven Intrahepatic Portosyst Rev (10/29/2019).    Medications:   Current Outpatient Medications:      acetaminophen (TYLENOL) 500 MG tablet, Take 1,000 mg by mouth every 6 hours as needed for mild pain, Disp: , Rfl:      Ascorbic Acid (VITAMIN C PO), Take by mouth daily, Disp: , Rfl:      CONSTULOSE 10 GM/15ML solution, TAKE 30MLS BY MOUTH THREE TIMES DAILY AS NEEDED FOR CONSTIPATION (TAKE AS NEEDED TO MAINTAIN 3 TO 4 BOWEL MOVEMENTS DAILY) (Patient taking differently: Take 20 g by mouth daily ), Disp: 1000 mL, Rfl: 11     fexofenadine (ALLEGRA) 60 MG tablet, Take 1 tablet (60 mg) by mouth daily, Disp: 30 tablet, Rfl: 1     furosemide (LASIX) 20 MG tablet, Take 1 tablet (20 mg) by mouth daily, Disp: 90 tablet, Rfl: 3     Menaquinone-7 (VITAMIN K2) 100 MCG CAPS, Take 200 mcg by mouth daily , Disp: , Rfl:      MULTIPLE VITAMINS PO, Take 1 tablet by mouth daily, Disp: , Rfl:      potassium chloride ER (K-DUR/KLOR-CON M) 20 MEQ CR tablet, Take 2 tablets (40 mEq) by mouth daily, Disp: 120 tablet, Rfl: 3     predniSONE (DELTASONE) 20 MG tablet, Take 1 tablet (20 mg) by mouth daily, Disp: 5 tablet, Rfl: 0     rifaximin (XIFAXAN) 550 MG TABS tablet, Take 1 tablet (550 mg) by mouth 2 times daily, Disp: 180 tablet, Rfl: 0     sertraline (ZOLOFT) 50 MG tablet, Take 0.5 tablets (25 mg) by mouth daily, Disp: 30 tablet, Rfl: 3     sodium bicarbonate 650 MG tablet, Take 1 tablet (650 mg) by mouth 2 times daily, Disp: 180 tablet, Rfl: 3      "spironolactone (ALDACTONE) 50 MG tablet, Take 1 tablet (50 mg) by mouth daily, Disp: 90 tablet, Rfl: 3     Thiamine HCl (B-1) 100 MG TABS, Take 1 tablet by mouth daily, Disp: 90 tablet, Rfl: 3     vitamin D3 (CHOLECALCIFEROL) 2000 units (50 mcg) tablet, Take 1 tablet by mouth daily, Disp: , Rfl:     Allergies:   Allergies   Allergen Reactions     Prednisone Visual Disturbance     Trazodone Visual Disturbance     Benadryl [Diphenhydramine] Other (See Comments)     Delirium (visual and auditory hallucinations)     Oxycodone Other (See Comments)     Delirium and constipation       Social Hx:  reports that he quit smoking about 21 years ago. His smoking use included dip, chew, snus or snuff. He smoked 0.00 packs per day. His smokeless tobacco use includes chew. He reports that he does not drink alcohol or use drugs.    Family Hx: family history includes Breast Cancer in his maternal grandmother; Cerebrovascular Disease (age of onset: 87) in his father; Depression in his daughter; Family History Negative in his father and mother; Hypertension in his father; Rheumatoid Arthritis in his daughter.    REVIEW OF SYSTEMS: 10 point ROS neg other than the symptoms noted above in the HPI and PMH. Notables include  CONSTITUTIONAL:NEGATIVE for fever, chills, change in weight  INTEGUMENTARY/SKIN: NEGATIVE for worrisome rashes, moles or lesions  MUSCULOSKELETAL:See HPI above  Neurology: see HPI above.      EXAM:  Ht 1.778 m (5' 10\")   Wt 78 kg (172 lb)   BMI 24.68 kg/m            ASSESSMENT: 55yo RHD male with right long/ring finger pain, swelling, locking, likely trigger finger, possible carpal tunnel syndrome.    PLAN:   * likely trigger finger to explain locking/snapping  * unclear of numbness and tingling, swelling of hands/fingers    If indeed trigger finger, we talked about the options: taping the PIP, splinting the PIP joint, corticosteroid injections, hand therapy and surgery. I explained the risks and benefits of each, " including recurrence. I have explained the nature of the surgical procedure, the risks and recovery time with the patient.  * At this time, advised patient to make a face to face visit with us to further evaluate and try to confirm diagnosis, and appropriate treatment options.    He was appreciative of the call.      Pascual Valencia M.D., M.S.  Dept. of Orthopaedic Surgery  [facility]      Phone call duration: 9:33 minutes:seconds.            Again, thank you for allowing me to participate in the care of your patient.        Sincerely,        Pascual Valencia MD

## 2020-06-01 ENCOUNTER — ANCILLARY PROCEDURE (OUTPATIENT)
Dept: GENERAL RADIOLOGY | Facility: CLINIC | Age: 55
End: 2020-06-01
Attending: PHYSICIAN ASSISTANT
Payer: COMMERCIAL

## 2020-06-01 ENCOUNTER — OFFICE VISIT (OUTPATIENT)
Dept: ORTHOPEDICS | Facility: CLINIC | Age: 55
End: 2020-06-01
Payer: COMMERCIAL

## 2020-06-01 ENCOUNTER — TELEPHONE (OUTPATIENT)
Dept: ORTHOPEDICS | Facility: CLINIC | Age: 55
End: 2020-06-01

## 2020-06-01 VITALS
DIASTOLIC BLOOD PRESSURE: 75 MMHG | WEIGHT: 174 LBS | BODY MASS INDEX: 24.91 KG/M2 | HEIGHT: 70 IN | SYSTOLIC BLOOD PRESSURE: 122 MMHG

## 2020-06-01 DIAGNOSIS — Z11.59 ENCOUNTER FOR SCREENING FOR OTHER VIRAL DISEASES: Primary | ICD-10-CM

## 2020-06-01 DIAGNOSIS — M79.641 RIGHT HAND PAIN: ICD-10-CM

## 2020-06-01 DIAGNOSIS — M79.641 RIGHT HAND PAIN: Primary | ICD-10-CM

## 2020-06-01 DIAGNOSIS — M65.30 TRIGGER FINGER, ACQUIRED: ICD-10-CM

## 2020-06-01 PROCEDURE — 73130 X-RAY EXAM OF HAND: CPT | Mod: RT

## 2020-06-01 PROCEDURE — 99213 OFFICE O/P EST LOW 20 MIN: CPT | Performed by: ORTHOPAEDIC SURGERY

## 2020-06-01 ASSESSMENT — MIFFLIN-ST. JEOR: SCORE: 1625.51

## 2020-06-01 ASSESSMENT — PAIN SCALES - GENERAL: PAINLEVEL: SEVERE PAIN (7)

## 2020-06-01 NOTE — LETTER
"    6/1/2020         RE: Ivan Bell  [address] [facility] [address]        Dear Colleague,    Thank you for referring your patient, Ivan Bell, to the [facility]. Please see a copy of my visit note below.        CHIEF COMPLAINT:   Chief Complaint   Patient presents with     Right Hand - Pain     Right hand pain. Long, ring and small finger. In the morning the fingers are locked. Fingers are very tender.      Hand Pain     He was using a maul about 2-3 weeks ago with NKI, just sore afterwards. 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 Right hand dominant.       HISTORY:  Ivan Bell is a 54 year old male , Right -hand dominant who is seen today for right hand pain and locking/snapping x 2-3  Weeks. Locates pain in the \"knuckles\" and palm.  The symptoms have been intermittent and not helped with 5 days of oral prednisone. he has numbness and tingling in the tips of the long and ring fingers. Fingers are really sore in the morning. Hand/fingers swell. Can't grasp and hold on to things, can't stand the pain. Most pain in the ring finger more than long finger, a little pain in small and index fingers. The ring finger locks the most. States similar problem 3-4 years ago treated with cortisone without relief, he thinks at [place], but he can't recall exactly what he was diagnosed with at that time.     Works as a , self-employed.    Suspected cause: Due to unknown factors.    Pain severity: 7/10  Pain quality: dull, aching and stabbing  Frequency of symptoms: are constant.  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      Other PMH:  has a past medical history of Alcoholic cirrhosis (H), Alcoholic hepatitis (03/2019), Gout, Hypertension, Hypertriglyceridemia, Left calcaneus fracture (1/9/2006), and Portal vein thrombosis.   Patient Active Problem List    Diagnosis Date Noted     CKD (chronic kidney disease) stage 2, GFR 60-89 " ml/min 04/17/2020     Priority: Medium     Seasonal allergic rhinitis, unspecified trigger 04/17/2020     Priority: Medium     Hypokalemia 12/31/2019     Priority: Medium     Decompensation of cirrhosis of liver (H) 03/10/2019     Priority: Medium     Calculus of gallbladder without cholecystitis 09/23/2018     Priority: Medium     September 23, 2018 non-obstructing, incidental finding on us.        Nephrolithiasis 09/23/2018     Priority: Medium     September 23, 2018 non-obstructing, incident finding on us.        Alcoholic cirrhosis of liver with ascites (H) 03/08/2016     Priority: Medium     September 23, 2018 now sober, ascites resolved, S/P TIP procedure 6/2018. Normal liver enzymes, diminished liver function.  INR 1.6, bilirubin 2.5, albumin 2.6. He  appears healthy. Doing well. Stressed the importance of abstaining from alcohol. See copy of recent us.     9/10/18:  Impression:   1. Patent TIPS with appropriate in stent velocities. Normal Doppler  evaluation of the remainder of the hepatic vasculature in the setting  of TIPS.  2. Cirrhotic hepatic morphology with evidence of portal hypertension,  including splenomegaly. No focal hepatic mass.  3. Cholelithiasis without evidence of cholecystitis.  4. Bilateral nonobstructing nephrolithiasis.            Hypertension goal BP (blood pressure) < 140/90 12/18/2012     Priority: Medium     24 hour contact given to patient  01/02/2012     Priority: Medium     EMERGENCY CARE PLAN  Presenting Problem Signs and Symptoms Treatment Plan    Questions or conerns during clinic hours    I will call the clinic directly     Questions or conerns outside clinic hours    I will call the 24 hour nurse line at [phone]    Patient needs to schedule an appointment    I will call the 24 hour scheduling team at [phone] or clinic directly    Same day treatment     I will call the clinic first, nurse line if after hours, urgent care and express care if needed                                  CARDIOVASCULAR SCREENING; LDL GOAL LESS THAN 130 10/31/2010     Priority: Medium     Erectile dysfunction 01/29/2008     Priority: Medium     September 23, 2018 no known CAD, no contraindications to sildenafil.        Gouty arthropathy 12/31/2006     Priority: Medium     Problem list name updated by automated process. Provider to review and confirm  Imo Update utility       Hypertriglyceridemia 08/03/2005     Priority: Medium     Last Exam: December 19, 2007  Last Lipids: CHOL      211   01/25/2007 LDL      104   01/25/2007 HDL       83   01/25/2007  Last LFTs:: AST      106   01/25/2007   ALT       53   01/25/2007    TRIG      120   01/25/2007  TRIG      115   01/16/2006  Meds: Lopid 600 bid - tolerating           Surgical Hx:  has a past surgical history that includes Colonoscopy (N/A, 3/31/2016); knee surgery (Left); knee surgery (Right); Ankle surgery (Left); Esophagoscopy, gastroscopy, duodenoscopy (EGD), combined (N/A, 3/31/2016); Sigmoidoscopy flexible (N/A, 10/31/2017); Esophagoscopy, gastroscopy, duodenoscopy (EGD), combined (N/A, 3/9/2018); TIPS Procedure (06/06/2018); Esophagoscopy, gastroscopy, duodenoscopy (EGD), combined (N/A, 6/7/2019); IR Paracentesis (10/29/2019); and IR Transven Intrahepatic Portosyst Rev (10/29/2019).    Medications:   Current Outpatient Medications:      acetaminophen (TYLENOL) 500 MG tablet, Take 1,000 mg by mouth every 6 hours as needed for mild pain, Disp: , Rfl:      Ascorbic Acid (VITAMIN C PO), Take by mouth daily, Disp: , Rfl:      CONSTULOSE 10 GM/15ML solution, TAKE 30MLS BY MOUTH THREE TIMES DAILY AS NEEDED FOR CONSTIPATION (TAKE AS NEEDED TO MAINTAIN 3 TO 4 BOWEL MOVEMENTS DAILY) (Patient taking differently: Take 20 g by mouth daily ), Disp: 1000 mL, Rfl: 11     fexofenadine (ALLEGRA) 60 MG tablet, Take 1 tablet (60 mg) by mouth daily, Disp: 30 tablet, Rfl: 1     furosemide (LASIX) 20 MG tablet, Take 1 tablet (20 mg) by mouth daily, Disp: 90 tablet, Rfl:  3     Menaquinone-7 (VITAMIN K2) 100 MCG CAPS, Take 200 mcg by mouth daily , Disp: , Rfl:      MULTIPLE VITAMINS PO, Take 1 tablet by mouth daily, Disp: , Rfl:      potassium chloride ER (K-DUR/KLOR-CON M) 20 MEQ CR tablet, Take 2 tablets (40 mEq) by mouth daily, Disp: 120 tablet, Rfl: 3     predniSONE (DELTASONE) 20 MG tablet, Take 1 tablet (20 mg) by mouth daily, Disp: 5 tablet, Rfl: 0     rifaximin (XIFAXAN) 550 MG TABS tablet, Take 1 tablet (550 mg) by mouth 2 times daily, Disp: 180 tablet, Rfl: 0     sertraline (ZOLOFT) 50 MG tablet, Take 0.5 tablets (25 mg) by mouth daily, Disp: 30 tablet, Rfl: 3     sodium bicarbonate 650 MG tablet, Take 1 tablet (650 mg) by mouth 2 times daily, Disp: 180 tablet, Rfl: 3     spironolactone (ALDACTONE) 50 MG tablet, Take 1 tablet (50 mg) by mouth daily, Disp: 90 tablet, Rfl: 3     Thiamine HCl (B-1) 100 MG TABS, Take 1 tablet by mouth daily, Disp: 90 tablet, Rfl: 3     vitamin D3 (CHOLECALCIFEROL) 2000 units (50 mcg) tablet, Take 1 tablet by mouth daily, Disp: , Rfl:     Allergies:   Allergies   Allergen Reactions     Prednisone Visual Disturbance     Trazodone Visual Disturbance     Benadryl [Diphenhydramine] Other (See Comments)     Delirium (visual and auditory hallucinations)     Oxycodone Other (See Comments)     Delirium and constipation       Social Hx:  reports that he quit smoking about 21 years ago. His smoking use included dip, chew, snus or snuff. He smoked 0.00 packs per day. His smokeless tobacco use includes chew. He reports that he does not drink alcohol or use drugs.    Family Hx: family history includes Breast Cancer in his maternal grandmother; Cerebrovascular Disease (age of onset: 87) in his father; Depression in his daughter; Family History Negative in his father and mother; Hypertension in his father; Rheumatoid Arthritis in his daughter.    REVIEW OF SYSTEMS: 10 point ROS neg other than the symptoms noted above in the HPI and PMH. Notables  "include  CONSTITUTIONAL:NEGATIVE for fever, chills, change in weight  INTEGUMENTARY/SKIN: NEGATIVE for worrisome rashes, moles or lesions  MUSCULOSKELETAL:See HPI above  Neurology: see HPI above.      EXAM:  /75   Ht 1.778 m (5' 10\")   Wt 78.9 kg (174 lb)   BMI 24.97 kg/m        GENERAL APPEARANCE: healthy, alert and no distress   GAIT: NORMAL  SKIN: diffuse purplish/ecchymotic lesions of the bilateral upper extremity; otherwise no suspicious lesions or rashes  RESPIRATORY: No increased work of breathing.  NEURO:  strength: decreased, thenar fasiculations:negative; Sensation diminished in the tips of all digits right hand compared to the left, reflexes normal in upper extremities.   PSYCH:  mentation appears normal and affect normal, not anxious.    MUSCULOSKELETAL:      RIGHT HAND/FINGERS:   Skin intact. Normal wear pattern, color and tone.   Decreased finger range of motion due to pain.  Mild diffuse finger swelling.  Special tests wrist:    Tinel's equivocal,    Phalen's positive.   Flexion/compression test positive    Special tests medial elbow ulnar nerve:    Tinel's negative,    Flexion/compression test negative.    Special tests median nerve proximal forearm:    Tinel's negative.    Good capillary refill to all fingers. 2+ radial pulse.    Triggering noted: slight crepitus of ring finger but no gross triggering  Severe tenderness over A1 pulley of ring and long fingers, slight tender to palpation over the small finger, mild tender to palpation index finger A1 pulley.    No observable or palpable masses of the fingers or palm or wrist.  No observable or palpable cords or nodules of the fingers or palm.    Intact EPL, FPL, FDP, EDC, wrist flexion/extension, biceps/triceps  Intact sensation to light touch in median, radial and ulnar nerve distributions.      LEFT HAND/FINGERS:   Skin intact. Normal wear pattern, color and tone.   No clubbing or nail pitting.  Range of Motion All Normal  Special " tests wrist:    Tinel's equivocal,    Phalen's positive.   Flexion/compression test positive    Good capillary refill to all fingers. 2+ radial pulse.    Triggering noted: none  Tenderness over A1 pulley of none of the fingers.    No observable or palpable masses of the fingers or palm or wrist.  No observable or palpable cords or nodules of the fingers or palm.    Intact EPL, FPL, FDP, EDC, wrist flexion/extension, biceps/triceps  Intact sensation to light touch in median, radial and ulnar nerve distributions.            ASSESSMENT: 55yo RHD male with :  1. right long/ring finger pain, swelling, locking, likely trigger finger  2.  possible bilateral carpal tunnel syndrome.    PLAN:   * likely trigger finger to explain locking/snapping; no active triggering today.  * unclear of numbness and tingling, swelling of hands/fingers likely due to some synovitis due to work, as well as likely carpal tunnel syndrome.  * wrist brace at night for numbness and tingling.    We talked about the options: taping the PIP, splinting the PIP joint, corticosteroid injections, hand therapy and surgery. I explained the risks and benefits of each, including recurrence. I have explained the nature of the surgical procedure, the risks and recovery time with the patient.  * at this time, patient states since he's had injection in past without relief, he'd like to proceed with trigger release.  * he is in understanding of the need to take it easy postoperative until wounds healed, sutures removed.  * plan right long/ring trigger finger release, outpatient, MAC with local.  * he will need preop H+P prior to surgery, as well as COVID-19 testing.  * return to clinic 2 weeks postoperative for wound check,suture removal.    Ivan to follow up with Primary Care provider regarding elevated blood pressure.        Pascual Valencia M.D., M.S.  Dept. of Orthopaedic Surgery  [facility]              Again, thank you for allowing me to  participate in the care of your patient.        Sincerely,        Pascual Valencia MD

## 2020-06-01 NOTE — TELEPHONE ENCOUNTER
Type of surgery: Right ring and right long finger trigger ginfer release  CPT 42330   Right hand pain M79.641     Trigger finger, acquired M65.30    Location of surgery: [facility]  Date and time of surgery: 6-11-20  Surgeon: Dr. Valencia  Pre-Op Appt Date: TBD  Post-Op Appt Date: 6-26-20   Packet sent out: Yes  Pre-cert/Authorization completed: Per [employer]:  Group Number  [id number]  Line of Business  COMMERCIAL WITH [employer]  Date of Service  2020-06-11  Message  This group is managed by [employer] for the following programs: Medical Oncology, Molecular/Genetic Lab, Musculoskeletal, Radiation Therapy Program, Radiology/Cardiology, Sleep Management    Procedure Code 1  95271    Reference Number  [id number]  Status  NO AUTH REQUIRED      Date: 06/01/2020    Thank you,   Yael Gonzalez   Referral Department  [phone]

## 2020-06-03 ENCOUNTER — OFFICE VISIT (OUTPATIENT)
Dept: FAMILY MEDICINE | Facility: CLINIC | Age: 55
End: 2020-06-03
Payer: COMMERCIAL

## 2020-06-03 VITALS
SYSTOLIC BLOOD PRESSURE: 137 MMHG | TEMPERATURE: 97.2 F | HEIGHT: 70 IN | OXYGEN SATURATION: 100 % | WEIGHT: 171 LBS | HEART RATE: 78 BPM | BODY MASS INDEX: 24.48 KG/M2 | DIASTOLIC BLOOD PRESSURE: 71 MMHG

## 2020-06-03 DIAGNOSIS — N18.2 CKD (CHRONIC KIDNEY DISEASE) STAGE 2, GFR 60-89 ML/MIN: ICD-10-CM

## 2020-06-03 DIAGNOSIS — M65.30 TRIGGER FINGER, ACQUIRED: ICD-10-CM

## 2020-06-03 DIAGNOSIS — I10 HYPERTENSION GOAL BP (BLOOD PRESSURE) < 140/90: ICD-10-CM

## 2020-06-03 DIAGNOSIS — J30.2 SEASONAL ALLERGIC RHINITIS, UNSPECIFIED TRIGGER: ICD-10-CM

## 2020-06-03 DIAGNOSIS — K70.31 ALCOHOLIC CIRRHOSIS OF LIVER WITH ASCITES (H): ICD-10-CM

## 2020-06-03 DIAGNOSIS — K76.82 HEPATIC ENCEPHALOPATHY (H): ICD-10-CM

## 2020-06-03 DIAGNOSIS — Z01.818 PREOP GENERAL PHYSICAL EXAM: Primary | ICD-10-CM

## 2020-06-03 DIAGNOSIS — D69.6 THROMBOCYTOPENIA (H): ICD-10-CM

## 2020-06-03 PROCEDURE — 99215 OFFICE O/P EST HI 40 MIN: CPT | Performed by: PHYSICIAN ASSISTANT

## 2020-06-03 PROCEDURE — 80053 COMPREHEN METABOLIC PANEL: CPT | Performed by: PHYSICIAN ASSISTANT

## 2020-06-03 PROCEDURE — 36415 COLL VENOUS BLD VENIPUNCTURE: CPT | Performed by: PHYSICIAN ASSISTANT

## 2020-06-03 ASSESSMENT — MIFFLIN-ST. JEOR: SCORE: 1611.9

## 2020-06-03 NOTE — H&P (VIEW-ONLY)
[facility]  [address]  [place] [address]  [phone]  Dept: [phone]    PRE-OP EVALUATION:  Today's date: 6/3/2020    Ivan Bell (: 1965) presents for pre-operative evaluation assessment as requested by Dr. Valencia.  He requires evaluation and anesthesia risk assessment prior to undergoing surgery/procedure for treatment of RELEASE, TRIGGER FINGER, right ring and long finger  .    Proposed Surgery/ Procedure: RELEASE, TRIGGER FINGER, right ring and long finger   Date of Surgery/ Procedure: 20   Time of Surgery/ Procedure: 10:15am  Hospital/Surgical Facility: [facility]  Primary Physician: Too Washington  Type of Anesthesia Anticipated: Combined MAC with Local     Patient has a Health Care Directive or Living Will:  NO    1. NO - Do you have a history of heart attack, stroke, stent, bypass or surgery on an artery in the head, neck, heart or legs?  2. YES - DO YOU EVER HAVE ANY PAIN OR DISCOMFORT IN YOUR CHEST? No acute chest pain.    3. NO - Do you have a history of  Heart Failure?  4. NO - Are you troubled by shortness of breath when: walking on the level, up a slight hill or at night?  5. NO - Do you currently have a cold, bronchitis or other respiratory infection?  6. NO - Do you have a cough, shortness of breath or wheezing?  7. NO - Do you sometimes get pains in the calves of your legs when you walk?  8. NO - Do you or anyone in your family have previous history of blood clots?  9. NO - Do you or does anyone in your family have a serious bleeding problem such as prolonged bleeding following surgeries or cuts?  10. NO - Have you ever had problems with anemia or been told to take iron pills?  11. NO - Have you had any abnormal blood loss such as black, tarry or bloody stools, or abnormal vaginal bleeding?  12. YES - HAVE YOU EVER HAD A BLOOD TRANSFUSION? Had endoscopy performed during hospitalization 19.  Most recent CBC on 20 with a  stable hemoglobin at 12.0, platelets low at 59.  13. NO - Have you or any of your relatives ever had problems with anesthesia?  14. NO - Do you have sleep apnea, excessive snoring or daytime drowsiness?  15. NO - Do you have any prosthetic heart valves?  16. NO - Do you have prosthetic joints?  17. NO - Is there any chance that you may be pregnant?      HPI:     HPI related to upcoming procedure:  Has trigger fingers of right hand off and on for the past few months.  Condition has not responded to NSAIDs and rest.  Symptoms are worsening.  Plan is for trigger finger release.          HYPERTENSION - Patient has longstanding history of HTN , currently denies any symptoms referable to elevated blood pressure. Specifically denies chest pain, palpitations, dyspnea, orthopnea, PND or peripheral edema. Blood pressure readings have been in normal range. Current medication regimen is as listed below. Patient denies any side effects of medication.       MEDICAL HISTORY:     Patient Active Problem List    Diagnosis Date Noted     Thrombocytopenia (H) 06/05/2020     Priority: Medium     Trigger finger, acquired 06/01/2020     Priority: Medium     Added automatically from request for surgery [id number]       CKD (chronic kidney disease) stage 2, GFR 60-89 ml/min 04/17/2020     Priority: Medium     Seasonal allergic rhinitis, unspecified trigger 04/17/2020     Priority: Medium     Hypokalemia 12/31/2019     Priority: Medium     Decompensation of cirrhosis of liver (H) 03/10/2019     Priority: Medium     Calculus of gallbladder without cholecystitis 09/23/2018     Priority: Medium     September 23, 2018 non-obstructing, incidental finding on us.        Nephrolithiasis 09/23/2018     Priority: Medium     September 23, 2018 non-obstructing, incident finding on us.        Hepatic encephalopathy (H) 06/10/2018     Priority: Medium     Alcoholic cirrhosis of liver with ascites (H) 03/08/2016     Priority: Medium     September 23, 2018 now  sober, ascites resolved, S/P TIP procedure 6/2018. Normal liver enzymes, diminished liver function.  INR 1.6, bilirubin 2.5, albumin 2.6. He  appears healthy. Doing well. Stressed the importance of abstaining from alcohol. See copy of recent us.     9/10/18:  Impression:   1. Patent TIPS with appropriate in stent velocities. Normal Doppler  evaluation of the remainder of the hepatic vasculature in the setting  of TIPS.  2. Cirrhotic hepatic morphology with evidence of portal hypertension,  including splenomegaly. No focal hepatic mass.  3. Cholelithiasis without evidence of cholecystitis.  4. Bilateral nonobstructing nephrolithiasis.            Hypertension goal BP (blood pressure) < 140/90 12/18/2012     Priority: Medium     24 hour contact given to patient  01/02/2012     Priority: Medium     EMERGENCY CARE PLAN  Presenting Problem Signs and Symptoms Treatment Plan    Questions or conerns during clinic hours    I will call the clinic directly     Questions or conerns outside clinic hours    I will call the 24 hour nurse line at [phone]    Patient needs to schedule an appointment    I will call the 24 hour scheduling team at [phone] or clinic directly    Same day treatment     I will call the clinic first, nurse line if after hours, urgent care and express care if needed                                 CARDIOVASCULAR SCREENING; LDL GOAL LESS THAN 130 10/31/2010     Priority: Medium     Erectile dysfunction 01/29/2008     Priority: Medium     September 23, 2018 no known CAD, no contraindications to sildenafil.        Gouty arthropathy 12/31/2006     Priority: Medium     Problem list name updated by automated process. Provider to review and confirm  [employer] Update utility       Hypertriglyceridemia 08/03/2005     Priority: Medium     Last Exam: December 19, 2007  Last Lipids: CHOL      211   01/25/2007 LDL      104   01/25/2007 HDL       83   01/25/2007  Last LFTs:: AST      106   01/25/2007   ALT       53    01/25/2007    TRIG      120   01/25/2007  TRIG      115   01/16/2006  Meds: Lopid 600 bid - tolerating        Past Medical History:   Diagnosis Date     Alcoholic cirrhosis (H)      Alcoholic hepatitis 03/2019     Gout      Hypertension      Hypertriglyceridemia      Left calcaneus fracture 1/9/2006 January 16, 2006: Fell 10 feet from ladder onto left foot on frozen ground on 1/9/06 at home.  Immediate pain and unable to walk- seen at [place] and diagnosed with calcaneus fracture     Portal vein thrombosis     left occlusion, partial main     Past Surgical History:   Procedure Laterality Date     ANKLE SURGERY Left      COLONOSCOPY N/A 3/31/2016    Procedure: COLONOSCOPY;  Surgeon: Rhys Uriostegui MD;  Location:  GI     ESOPHAGOSCOPY, GASTROSCOPY, DUODENOSCOPY (EGD), COMBINED N/A 3/31/2016    Procedure: COMBINED ESOPHAGOSCOPY, GASTROSCOPY, DUODENOSCOPY (EGD);  Surgeon: Rhys Uriostegui MD;  Location:  GI     ESOPHAGOSCOPY, GASTROSCOPY, DUODENOSCOPY (EGD), COMBINED N/A 3/9/2018    Procedure: COMBINED ESOPHAGOSCOPY, GASTROSCOPY, DUODENOSCOPY (EGD), BIOPSY SINGLE OR MULTIPLE;  EGD;  Surgeon: Gonzalo Wahl MD;  Location:  GI     ESOPHAGOSCOPY, GASTROSCOPY, DUODENOSCOPY (EGD), COMBINED N/A 6/7/2019    Procedure: ESOPHAGOGASTRODUODENOSCOPY (EGD);  Surgeon: Gonzalo aWhl MD;  Location:  GI     IR PARACENTESIS  10/29/2019     IR TRANSVEN INTRAHEPATIC PORTOSYST REV  10/29/2019     KNEE SURGERY Left      KNEE SURGERY Right      SIGMOIDOSCOPY FLEXIBLE N/A 10/31/2017    Procedure: SIGMOIDOSCOPY FLEXIBLE;;  Surgeon: Armaan Adams MD;  Location:  GI     TIPS Procedure  06/06/2018     Current Outpatient Medications   Medication Sig Dispense Refill     acetaminophen (TYLENOL) 500 MG tablet Take 1,000 mg by mouth every 6 hours as needed for mild pain       Ascorbic Acid (VITAMIN C PO) Take by mouth daily       fexofenadine (ALLEGRA) 60 MG tablet Take 1 tablet (60 mg) by  mouth daily 30 tablet 1     furosemide (LASIX) 20 MG tablet Take 1 tablet (20 mg) by mouth daily 90 tablet 3     lactulose (CONSTULOSE) 10 GM/15ML solution Take 30 mLs (20 g) by mouth daily       Menaquinone-7 (VITAMIN K2) 100 MCG CAPS Take 200 mcg by mouth daily        MULTIPLE VITAMINS PO Take 1 tablet by mouth daily       potassium chloride ER (K-DUR/KLOR-CON M) 20 MEQ CR tablet Take 2 tablets (40 mEq) by mouth daily 120 tablet 3     rifaximin (XIFAXAN) 550 MG TABS tablet Take 1 tablet (550 mg) by mouth 2 times daily 180 tablet 0     sertraline (ZOLOFT) 50 MG tablet Take 0.5 tablets (25 mg) by mouth daily 30 tablet 3     sodium bicarbonate 650 MG tablet Take 1 tablet (650 mg) by mouth 2 times daily 180 tablet 3     spironolactone (ALDACTONE) 50 MG tablet Take 1 tablet (50 mg) by mouth daily 90 tablet 3     Thiamine HCl (B-1) 100 MG TABS Take 1 tablet by mouth daily 90 tablet 3     vitamin D3 (CHOLECALCIFEROL) 2000 units (50 mcg) tablet Take 1 tablet by mouth daily       OTC products: None, except as noted above    Allergies   Allergen Reactions     Prednisone Visual Disturbance     Trazodone Visual Disturbance     Benadryl [Diphenhydramine] Other (See Comments)     Delirium (visual and auditory hallucinations)     Oxycodone Other (See Comments)     Delirium and constipation      Latex Allergy: NO    Social History     Tobacco Use     Smoking status: Former Smoker     Packs/day: 0.00     Types: Dip, chew, snus or snuff     Last attempt to quit: 1998     Years since quittin.7     Smokeless tobacco: Current User     Types: Chew     Tobacco comment: 1 tin per 10 days.   Substance Use Topics     Alcohol use: No     Alcohol/week: 17.5 standard drinks     Types: 21 Cans of beer per week     Comment: last etoh 14, did have Odouls ~2017     History   Drug Use No       REVIEW OF SYSTEMS:   Constitutional, neuro, ENT, endocrine, pulmonary, cardiac, gastrointestinal, genitourinary, musculoskeletal,  "integument and psychiatric systems are negative, except as otherwise noted.    EXAM:   /71   Pulse 78   Temp 97.2  F (36.2  C) (Tympanic)   Ht 1.778 m (5' 10\")   Wt 77.6 kg (171 lb)   SpO2 100%   BMI 24.54 kg/m      GENERAL APPEARANCE: healthy, alert and no distress     EYES: EOMI,  PERRL     HENT: ear canals and TM's normal and nose and mouth without ulcers or lesions     NECK: no adenopathy, no asymmetry, masses, or scars and thyroid normal to palpation     RESP: lungs clear to auscultation - no rales, rhonchi or wheezes     CV: regular rates and rhythm, normal S1 S2, no S3 or S4 and no murmur, click or rub     ABDOMEN:  soft, nontender, no HSM or masses and bowel sounds normal     MS: extremities normal- no gross deformities noted, no evidence of inflammation in joints, FROM in all extremities.  Triggering noted of digits 2-5 of right hand.         SKIN: no suspicious lesions or rashes     NEURO: Normal strength and tone, sensory exam grossly normal, mentation intact and speech normal     PSYCH: mentation appears normal. and affect normal/bright     LYMPHATICS: No cervical adenopathy    DIAGNOSTICS:   EKG: Not indicated due to non-vascular surgery and last ekg on 12/31/19 (within 30 days for CAD history or last year for cardiac risk factors)    Recent Labs   Lab Test 05/27/20  1118 04/29/20  1353  02/27/20  0855  02/06/20  0756   HGB 12.0* 11.2*  --   --   --  10.3*   PLT 59* 53*   < >  --   --  63*   INR  --  1.99*  --   --   --  1.86*   NA  --  143  --  140   < > 142   POTASSIUM  --  5.0  --  4.0   < > 3.9   CR  --  1.33*  --  1.24   < > 1.20    < > = values in this interval not displayed.      CMP from 6/3/20:  Stable.   IMPRESSION:   Reason for surgery/procedure: trigger finger  Diagnosis/reason for consult: preop    The proposed surgical procedure is considered INTERMEDIATE risk.    REVISED CARDIAC RISK INDEX  The patient has the following serious cardiovascular risks for perioperative " complications such as (MI, PE, VFib and 3  AV Block):  No serious cardiac risks  INTERPRETATION: 1 risks: Class II (low risk - 0.9% complication rate)    The patient has the following additional risks for perioperative complications:  No identified additional risks  See problem list below, these conditions are stable.       ICD-10-CM    1. Preop general physical exam  Z01.818 Comprehensive metabolic panel (BMP + Alb, Alk Phos, ALT, AST, Total. Bili, TP)   2. Trigger finger, acquired  M65.30    3. Seasonal allergic rhinitis, unspecified trigger  J30.2    4. Hypertension goal BP (blood pressure) < 140/90  I10    5. CKD (chronic kidney disease) stage 2, GFR 60-89 ml/min  N18.2    6. Alcoholic cirrhosis of liver with ascites (H)  K70.31 lactulose (CONSTULOSE) 10 GM/15ML solution   7. Hepatic encephalopathy (H)  K72.90 lactulose (CONSTULOSE) 10 GM/15ML solution   8. Thrombocytopenia (H)  D69.6      Finger splints dispensed today for trigger finger.    RECOMMENDATIONS:       Cardiovascular Risk  Performs 4 METs exercise without symptoms (Climb a flight of stairs) .       --Patient is to take all scheduled medications on the day of surgery    APPROVAL GIVEN to proceed with proposed procedure, without further diagnostic evaluation       Signed Electronically by: Citlali Morgan PA-C    Copy of this evaluation report is provided to requesting physician.    [facility] Preop Guidelines    Revised Cardiac Risk Index

## 2020-06-03 NOTE — PATIENT INSTRUCTIONS
Taking morning meds with a  Small sip of water.       Before Your Surgery      Call your surgeon if there is any change in your health. This includes signs of a cold or flu (such as a sore throat, runny nose, cough, rash or fever).    Do not smoke, drink alcohol or take over the counter medicine (unless your surgeon or primary care doctor tells you to) for the 24 hours before and after surgery.    If you take prescribed drugs: Follow your doctor s orders about which medicines to take and which to stop until after surgery.    Eating and drinking prior to surgery: follow the instructions from your surgeon    Take a shower or bath the night before surgery. Use the soap your surgeon gave you to gently clean your skin. If you do not have soap from your surgeon, use your regular soap. Do not shave or scrub the surgery site.  Wear clean pajamas and have clean sheets on your bed.

## 2020-06-04 LAB
ALBUMIN SERPL-MCNC: 2.6 G/DL (ref 3.4–5)
ALP SERPL-CCNC: 156 U/L (ref 40–150)
ALT SERPL W P-5'-P-CCNC: 24 U/L (ref 0–70)
ANION GAP SERPL CALCULATED.3IONS-SCNC: 5 MMOL/L (ref 3–14)
AST SERPL W P-5'-P-CCNC: 45 U/L (ref 0–45)
BILIRUB SERPL-MCNC: 2.3 MG/DL (ref 0.2–1.3)
BUN SERPL-MCNC: 13 MG/DL (ref 7–30)
CALCIUM SERPL-MCNC: 8.9 MG/DL (ref 8.5–10.1)
CHLORIDE SERPL-SCNC: 114 MMOL/L (ref 94–109)
CO2 SERPL-SCNC: 25 MMOL/L (ref 20–32)
CREAT SERPL-MCNC: 1.19 MG/DL (ref 0.66–1.25)
GFR SERPL CREATININE-BSD FRML MDRD: 68 ML/MIN/{1.73_M2}
GLUCOSE SERPL-MCNC: 84 MG/DL (ref 70–99)
POTASSIUM SERPL-SCNC: 4.5 MMOL/L (ref 3.4–5.3)
PROT SERPL-MCNC: 5.9 G/DL (ref 6.8–8.8)
SODIUM SERPL-SCNC: 144 MMOL/L (ref 133–144)

## 2020-06-05 PROBLEM — D69.6 THROMBOCYTOPENIA (H): Status: ACTIVE | Noted: 2020-06-05

## 2020-06-05 PROBLEM — K76.82 HEPATIC ENCEPHALOPATHY (H): Status: ACTIVE | Noted: 2018-06-10

## 2020-06-05 RX ORDER — LACTULOSE 10 G/15ML
20 SOLUTION ORAL DAILY
COMMUNITY
Start: 2020-06-05 | End: 2020-12-21

## 2020-06-06 DIAGNOSIS — L29.9 ITCHING: ICD-10-CM

## 2020-06-06 DIAGNOSIS — K70.31 ALCOHOLIC CIRRHOSIS OF LIVER WITH ASCITES (H): ICD-10-CM

## 2020-06-09 DIAGNOSIS — Z11.59 ENCOUNTER FOR SCREENING FOR OTHER VIRAL DISEASES: ICD-10-CM

## 2020-06-09 PROCEDURE — 99207 ZZC NO BILLABLE SERVICE THIS VISIT: CPT

## 2020-06-09 PROCEDURE — 99000 SPECIMEN HANDLING OFFICE-LAB: CPT | Performed by: ORTHOPAEDIC SURGERY

## 2020-06-09 PROCEDURE — 87635 SARS-COV-2 COVID-19 AMP PRB: CPT | Mod: 90 | Performed by: ORTHOPAEDIC SURGERY

## 2020-06-10 ENCOUNTER — ANESTHESIA EVENT (OUTPATIENT)
Dept: SURGERY | Facility: AMBULATORY SURGERY CENTER | Age: 55
End: 2020-06-10

## 2020-06-10 LAB
SARS-COV-2 RNA SPEC QL NAA+PROBE: NOT DETECTED
SPECIMEN SOURCE: NORMAL

## 2020-06-11 ENCOUNTER — ANESTHESIA (OUTPATIENT)
Dept: SURGERY | Facility: AMBULATORY SURGERY CENTER | Age: 55
End: 2020-06-11
Payer: COMMERCIAL

## 2020-06-11 ENCOUNTER — HOSPITAL ENCOUNTER (OUTPATIENT)
Facility: AMBULATORY SURGERY CENTER | Age: 55
Discharge: HOME | End: 2020-06-11
Attending: ORTHOPAEDIC SURGERY | Admitting: ORTHOPAEDIC SURGERY
Payer: COMMERCIAL

## 2020-06-11 VITALS
SYSTOLIC BLOOD PRESSURE: 127 MMHG | OXYGEN SATURATION: 99 % | RESPIRATION RATE: 18 BRPM | TEMPERATURE: 97.9 F | DIASTOLIC BLOOD PRESSURE: 57 MMHG

## 2020-06-11 DIAGNOSIS — M65.30 TRIGGER FINGER, ACQUIRED: Primary | ICD-10-CM

## 2020-06-11 DIAGNOSIS — M65.30 TRIGGER FINGER, ACQUIRED: ICD-10-CM

## 2020-06-11 PROCEDURE — 26055 INCISE FINGER TENDON SHEATH: CPT | Mod: F3 | Performed by: ORTHOPAEDIC SURGERY

## 2020-06-11 PROCEDURE — G8907 PT DOC NO EVENTS ON DISCHARG: HCPCS

## 2020-06-11 PROCEDURE — G8916 PT W IV AB GIVEN ON TIME: HCPCS

## 2020-06-11 PROCEDURE — 26055 INCISE FINGER TENDON SHEATH: CPT | Mod: F8

## 2020-06-11 RX ORDER — FENTANYL CITRATE 50 UG/ML
INJECTION, SOLUTION INTRAMUSCULAR; INTRAVENOUS PRN
Status: DISCONTINUED | OUTPATIENT
Start: 2020-06-11 | End: 2020-06-11

## 2020-06-11 RX ORDER — PROPOFOL 10 MG/ML
INJECTION, EMULSION INTRAVENOUS PRN
Status: DISCONTINUED | OUTPATIENT
Start: 2020-06-11 | End: 2020-06-11

## 2020-06-11 RX ORDER — METHOCARBAMOL 750 MG/1
750 TABLET, FILM COATED ORAL
Status: DISCONTINUED | OUTPATIENT
Start: 2020-06-11 | End: 2020-06-12 | Stop reason: HOSPADM

## 2020-06-11 RX ORDER — NALOXONE HYDROCHLORIDE 0.4 MG/ML
.1-.4 INJECTION, SOLUTION INTRAMUSCULAR; INTRAVENOUS; SUBCUTANEOUS
Status: DISCONTINUED | OUTPATIENT
Start: 2020-06-11 | End: 2020-06-12 | Stop reason: HOSPADM

## 2020-06-11 RX ORDER — HYDROCODONE BITARTRATE AND ACETAMINOPHEN 5; 325 MG/1; MG/1
1 TABLET ORAL
Status: DISCONTINUED | OUTPATIENT
Start: 2020-06-11 | End: 2020-06-12 | Stop reason: HOSPADM

## 2020-06-11 RX ORDER — ALBUTEROL SULFATE 0.83 MG/ML
2.5 SOLUTION RESPIRATORY (INHALATION) EVERY 4 HOURS PRN
Status: DISCONTINUED | OUTPATIENT
Start: 2020-06-11 | End: 2020-06-12 | Stop reason: HOSPADM

## 2020-06-11 RX ORDER — ACETAMINOPHEN 325 MG/1
650 TABLET ORAL
Status: DISCONTINUED | OUTPATIENT
Start: 2020-06-11 | End: 2020-06-12 | Stop reason: HOSPADM

## 2020-06-11 RX ORDER — ACETAMINOPHEN 325 MG/1
975 TABLET ORAL ONCE
Status: DISCONTINUED | OUTPATIENT
Start: 2020-06-11 | End: 2020-06-12 | Stop reason: HOSPADM

## 2020-06-11 RX ORDER — LIDOCAINE HYDROCHLORIDE 20 MG/ML
INJECTION, SOLUTION INFILTRATION; PERINEURAL PRN
Status: DISCONTINUED | OUTPATIENT
Start: 2020-06-11 | End: 2020-06-11

## 2020-06-11 RX ORDER — HYDROMORPHONE HYDROCHLORIDE 1 MG/ML
.3-.5 INJECTION, SOLUTION INTRAMUSCULAR; INTRAVENOUS; SUBCUTANEOUS EVERY 10 MIN PRN
Status: DISCONTINUED | OUTPATIENT
Start: 2020-06-11 | End: 2020-06-12 | Stop reason: HOSPADM

## 2020-06-11 RX ORDER — DEXAMETHASONE SODIUM PHOSPHATE 4 MG/ML
4 INJECTION, SOLUTION INTRA-ARTICULAR; INTRALESIONAL; INTRAMUSCULAR; INTRAVENOUS; SOFT TISSUE EVERY 10 MIN PRN
Status: DISCONTINUED | OUTPATIENT
Start: 2020-06-11 | End: 2020-06-12 | Stop reason: HOSPADM

## 2020-06-11 RX ORDER — ONDANSETRON 4 MG/1
4 TABLET, ORALLY DISINTEGRATING ORAL
Status: DISCONTINUED | OUTPATIENT
Start: 2020-06-11 | End: 2020-06-12 | Stop reason: HOSPADM

## 2020-06-11 RX ORDER — PROPOFOL 10 MG/ML
INJECTION, EMULSION INTRAVENOUS CONTINUOUS PRN
Status: DISCONTINUED | OUTPATIENT
Start: 2020-06-11 | End: 2020-06-11

## 2020-06-11 RX ORDER — HYDROXYZINE HYDROCHLORIDE 25 MG/1
25 TABLET, FILM COATED ORAL
Status: DISCONTINUED | OUTPATIENT
Start: 2020-06-11 | End: 2020-06-12 | Stop reason: HOSPADM

## 2020-06-11 RX ORDER — FENTANYL CITRATE 50 UG/ML
25-50 INJECTION, SOLUTION INTRAMUSCULAR; INTRAVENOUS
Status: DISCONTINUED | OUTPATIENT
Start: 2020-06-11 | End: 2020-06-12 | Stop reason: HOSPADM

## 2020-06-11 RX ORDER — SODIUM CHLORIDE, SODIUM LACTATE, POTASSIUM CHLORIDE, CALCIUM CHLORIDE 600; 310; 30; 20 MG/100ML; MG/100ML; MG/100ML; MG/100ML
INJECTION, SOLUTION INTRAVENOUS CONTINUOUS
Status: DISCONTINUED | OUTPATIENT
Start: 2020-06-11 | End: 2020-06-12 | Stop reason: HOSPADM

## 2020-06-11 RX ORDER — GABAPENTIN 300 MG/1
300 CAPSULE ORAL ONCE
Status: DISCONTINUED | OUTPATIENT
Start: 2020-06-11 | End: 2020-06-12 | Stop reason: HOSPADM

## 2020-06-11 RX ORDER — ONDANSETRON 2 MG/ML
INJECTION INTRAMUSCULAR; INTRAVENOUS PRN
Status: DISCONTINUED | OUTPATIENT
Start: 2020-06-11 | End: 2020-06-11

## 2020-06-11 RX ORDER — CEFAZOLIN SODIUM 1 G/3ML
1 INJECTION, POWDER, FOR SOLUTION INTRAMUSCULAR; INTRAVENOUS SEE ADMIN INSTRUCTIONS
Status: DISCONTINUED | OUTPATIENT
Start: 2020-06-11 | End: 2020-06-12 | Stop reason: HOSPADM

## 2020-06-11 RX ORDER — CEFAZOLIN SODIUM 2 G/100ML
2 INJECTION, SOLUTION INTRAVENOUS
Status: COMPLETED | OUTPATIENT
Start: 2020-06-11 | End: 2020-06-11

## 2020-06-11 RX ORDER — KETOROLAC TROMETHAMINE 30 MG/ML
30 INJECTION, SOLUTION INTRAMUSCULAR; INTRAVENOUS EVERY 6 HOURS PRN
Status: DISCONTINUED | OUTPATIENT
Start: 2020-06-11 | End: 2020-06-12 | Stop reason: HOSPADM

## 2020-06-11 RX ORDER — TRAMADOL HYDROCHLORIDE 50 MG/1
50 TABLET ORAL EVERY 6 HOURS PRN
Qty: 10 TABLET | Refills: 0 | Status: SHIPPED | OUTPATIENT
Start: 2020-06-11 | End: 2020-06-14

## 2020-06-11 RX ORDER — ONDANSETRON 4 MG/1
4 TABLET, ORALLY DISINTEGRATING ORAL EVERY 30 MIN PRN
Status: DISCONTINUED | OUTPATIENT
Start: 2020-06-11 | End: 2020-06-12 | Stop reason: HOSPADM

## 2020-06-11 RX ORDER — AMOXICILLIN 250 MG
1-2 CAPSULE ORAL 2 TIMES DAILY
Qty: 30 TABLET | Refills: 0 | Status: SHIPPED | OUTPATIENT
Start: 2020-06-11 | End: 2021-12-07

## 2020-06-11 RX ORDER — ONDANSETRON 2 MG/ML
4 INJECTION INTRAMUSCULAR; INTRAVENOUS EVERY 30 MIN PRN
Status: DISCONTINUED | OUTPATIENT
Start: 2020-06-11 | End: 2020-06-12 | Stop reason: HOSPADM

## 2020-06-11 RX ORDER — LIDOCAINE 40 MG/G
CREAM TOPICAL
Status: DISCONTINUED | OUTPATIENT
Start: 2020-06-11 | End: 2020-06-12 | Stop reason: HOSPADM

## 2020-06-11 RX ORDER — MEPERIDINE HYDROCHLORIDE 25 MG/ML
12.5 INJECTION INTRAMUSCULAR; INTRAVENOUS; SUBCUTANEOUS
Status: DISCONTINUED | OUTPATIENT
Start: 2020-06-11 | End: 2020-06-12 | Stop reason: HOSPADM

## 2020-06-11 RX ADMIN — LIDOCAINE HYDROCHLORIDE 40 MG: 20 INJECTION, SOLUTION INFILTRATION; PERINEURAL at 10:26

## 2020-06-11 RX ADMIN — PROPOFOL 50 MG: 10 INJECTION, EMULSION INTRAVENOUS at 10:30

## 2020-06-11 RX ADMIN — PROPOFOL 50 MG: 10 INJECTION, EMULSION INTRAVENOUS at 10:28

## 2020-06-11 RX ADMIN — PROPOFOL 150 MCG/KG/MIN: 10 INJECTION, EMULSION INTRAVENOUS at 10:28

## 2020-06-11 RX ADMIN — CEFAZOLIN SODIUM 2 G: 2 INJECTION, SOLUTION INTRAVENOUS at 10:31

## 2020-06-11 RX ADMIN — PROPOFOL 40 MG: 10 INJECTION, EMULSION INTRAVENOUS at 10:43

## 2020-06-11 RX ADMIN — PROPOFOL 50 MG: 10 INJECTION, EMULSION INTRAVENOUS at 10:32

## 2020-06-11 RX ADMIN — SODIUM CHLORIDE, SODIUM LACTATE, POTASSIUM CHLORIDE, CALCIUM CHLORIDE: 600; 310; 30; 20 INJECTION, SOLUTION INTRAVENOUS at 09:23

## 2020-06-11 RX ADMIN — FENTANYL CITRATE 50 MCG: 50 INJECTION, SOLUTION INTRAMUSCULAR; INTRAVENOUS at 10:23

## 2020-06-11 RX ADMIN — ONDANSETRON 4 MG: 2 INJECTION INTRAMUSCULAR; INTRAVENOUS at 10:42

## 2020-06-11 NOTE — DISCHARGE INSTRUCTIONS
[facility]   Adult Discharge Orders & Instructions     For 24 hours after surgery    1. Get plenty of rest.  A responsible adult must stay with you for at least 24 hours after you leave the hospital.   2. Do not drive or use heavy equipment.  If you have weakness or tingling, don't drive or use heavy equipment until this feeling goes away.  3. Do not drink alcohol.  4. Avoid strenuous or risky activities.  Ask for help when climbing stairs.   5. You may feel lightheaded.  IF so, sit for a few minutes before standing.  Have someone help you get up.   6. If you have nausea (feel sick to your stomach): Drink only clear liquids such as apple juice, ginger ale, broth or 7-Up.  Rest may also help.  Be sure to drink enough fluids.  Move to a regular diet as you feel able.  7. You may have a slight fever. Call the doctor if your fever is over 100 F (37.7 C) (taken under the tongue) or lasts longer than 24 hours.  8. You may have a dry mouth, a sore throat, muscle aches or trouble sleeping.  These should go away after 24 hours.  9. Do not make important or legal decisions.     Call your doctor for any of the followin.  Signs of infection (fever, growing tenderness at the surgery site, a large amount of drainage or bleeding, severe pain, foul-smelling drainage, redness, swelling).    2. It has been over 8 to 10 hours since surgery and you are still not able to urinate (pass water).    3.  Headache for over 24 hours.    4.  Numbness, tingling or weakness the day after surgery (if you had spinal anesthesia).                  5. Signs of Covid-19 infection (temperature over 100 degrees, shortness of breath, cough, loss of taste/smell, generalized body aches, persistent headache,                  chills, sore throat, nausea/vomiting/diarrhea).    ___To contact Dr Valencia call:  [phone]___________________________________      1. Name: Ivan Bell MRN #: [id number]  2. Date: 2020  Procedure:  right ring and long finger trigger releases  3. Discharge to home when stable, tolerating clear liquids, and patient has urinated  4. Call for follow-up appointment, [phone], with Dr. Valencia in:  2 weeks   WOUND CARE    The bandage may be slightly bloody. This is normal.  5. Ice:  Keep an ice bag on your hand for 20 minutes at a time.  6. Keep incisions clean and dry following surgery for:  72 hours   7. Change all bandages in:  72 hours       8. If bandages are changed before follow-up, cover all incisions with fresh bandages or bandaids.  9. O.K. to shower (may get incision wet) in:  72 hours  10. No tub baths, swimming pools, hot tubs, etc. for a minimum of 2 weeks following surgery  ACTIVITY  11. Keep hand elevated above heart at all times for the first few days after surgery, then as much as possible.  12. Weight-bearing ([place]):  Partial weight-bearing 30%     13. Exercises:  Perform exercises 3 times a day for a minimum of 25 reps each time (start today or tomorrow):             Finger range of motion   14. OK to drive:  Not for 24 hours    When going back to driving, be sure to test braking/acceleration maneuvers in an empty parking lot before entry into any traffic areas.      ABSOLUTELY NO DRIVING WHILE TAKING NARCOTICS!    DISCHARGE MEDICATIONS:   Other: tramadol  Ok to take over the counter medications such as acetaminophen or ibuprofen as needed.    Strong pain medication has been prescribed. Use as directed. Do not combine with alcohol. Be careful as you walk or climb stairs.   DIET:  If no nausea, clear liquids should be taken initially.  Then progress to solid foods when clear liquids are tolerated.   RESPONSE TO SURGERY: It is normal to have pain and swelling in your hand after surgery. It may take 4 weeks or longer for the swelling to go away. It is also common to notice some bruising around the hand, wrist and forearm as the swelling resolves.  EMERGENCY: Call or return for any fevers  (temperature greater than 101.5   or sustained fevers greater than 100.5   that haven t resolved within 3 to 4 days following surgery) or chills, increasing pain, swelling, redness, drainage (especially if yellow, green, or foul smelling), excessive bleeding), chest pain, shortness of breath:  Phone #: [phone]; If emergency, go to local ER or dial 911.    Pascual Valencia M.D., M.S.  Dept. of Orthopaedic Surgery  [facility]      6/11/2020        Exercises should be performed at least 3 times per day. Move in pain-free range.           Finger Exercises      Perform 1 set of 20 reps, each exercise, 3 times a day    Perform 1 rep every 4 seconds    Rest 1 minute between sets                                Elbow Flexion/Extension     Begin with arm at side, elbow straight, palm up    Bend elbow upward    Return to starting position    Perform 1 set of 20 reps, 3 times a day    Perform 1 rep every 4 seconds    Rest 1 minute between sets           Wrist Flexion/Extension     Place forearm on table with hand off edge, palm up    Move hand upward    Return to start position    Perform 1 set of 20 reps, 3 times a day    Perform 1 rep every 4 seconds    Rest 1 minute between sets

## 2020-06-11 NOTE — ANESTHESIA POSTPROCEDURE EVALUATION
Anesthesia POST Procedure Evaluation    Patient: Ivan Bell   MRN:     [id number] Gender:   male   Age:    54 year old :      1965        Preoperative Diagnosis: Trigger finger, acquired [M65.30]   Procedure(s):  RELEASE, TRIGGER FINGER, right ring and long finger   Postop Comments: No value filed.     Anesthesia Type: MAC          Postop Pain Control: Uneventful            Sign Out: Well controlled pain   PONV: No   Neuro/Psych: Uneventful            Sign Out: Acceptable/Baseline neuro status   Airway/Respiratory: Uneventful            Sign Out: Acceptable/Baseline resp. status   CV/Hemodynamics: Uneventful            Sign Out: Acceptable CV status   Other NRE: NONE   DID A NON-ROUTINE EVENT OCCUR? No         Last Anesthesia Record Vitals:  CRNA VITALS  2020 1039 - 2020 1139      2020             Pulse:  67    SpO2:  98 %          Last PACU Vitals:  Vitals Value Taken Time   /48 2020 11:10 AM   Temp 36.6  C (97.9  F) 2020 11:10 AM   Pulse     Resp 18 2020 11:10 AM   SpO2 100 % 2020 11:10 AM   Temp src Skin 2020 11:00 AM   NIBP 91/45 2020 11:04 AM   Pulse 67 2020 11:09 AM   SpO2 98 % 2020 11:09 AM   Resp     Temp 36.8  C (98.2  F) 2020 11:04 AM   Ht Rate     Temp 2           Electronically Signed By: Priyank Vu MD, 2020, 3:47 PM

## 2020-06-11 NOTE — INTERVAL H&P NOTE
"The History and Physical on patient's chart was personally reviewed today with the patient. there have been no interval changes in patient's history since H+P performed.    History:  Ivan Bell is a 54 year old male , Right -hand dominant with  right hand pain and locking/snapping x 3-4  Weeks. Locates pain in the \"knuckles\" and palm.  The symptoms have been intermittent and not helped with 5 days of oral prednisone. he has numbness and tingling in the tips of the long and ring fingers. Fingers are really sore in the morning. Hand/fingers swell. Can't grasp and hold on to things, can't stand the pain. Most pain in the ring finger more than long finger, a little pain in small and index fingers. The ring finger locks the most. States similar problem 3-4 years ago treated with cortisone without relief, he thinks at [facility], but he can't recall exactly what he was diagnosed with at that time.      Works as a , self-employed.    ASSESSMENT: 53yo RHD male with :  1. right long/ring finger pain, swelling, locking, likely trigger finger  2.  possible bilateral carpal tunnel syndrome.     PLAN:   * likely trigger finger to explain locking/snapping; no active triggering today.  * unclear of numbness and tingling, swelling of hands/fingers likely due to some synovitis due to work, as well as likely carpal tunnel syndrome.  * wrist brace at night for numbness and tingling.     We talked about the options: taping the PIP, splinting the PIP joint, corticosteroid injections, hand therapy and surgery. I explained the risks and benefits of each, including recurrence. I have explained the nature of the surgical procedure, the risks and recovery time with the patient.  * at this time, patient states since he's had injection in past without relief, he'd like to proceed with trigger release.  * he is in understanding of the need to take it easy postoperative until wounds healed, sutures removed.  * plan right long/ring " trigger finger release, outpatient, [facility] with local.      Risks and perceived benefits of surgery again discussed with patient. Patient's questions addressed and answered. Written informed consent obtained and reviewed. Surgical site marked with indelible marker with patient's participation after confirming site with patient.      Pascual Valencia M.D., M.S.  Dept. of Orthopaedic Surgery  [facility]

## 2020-06-11 NOTE — ANESTHESIA PREPROCEDURE EVALUATION
Anesthesia Pre-Procedure Evaluation    Patient: Ivan Bell   MRN:     [id number] Gender:   male   Age:    54 year old :      1965        Preoperative Diagnosis: Trigger finger, acquired [M65.30]   Procedure(s):  RELEASE, TRIGGER FINGER, right ring and long finger     LABS:  CBC:   Lab Results   Component Value Date    WBC 4.1 2020    WBC 3.2 (L) 2020    HGB 12.0 (L) 2020    HGB 11.2 (L) 2020    HCT 37.0 (L) 2020    HCT 34.7 (L) 2020    PLT 59 (L) 2020    PLT 53 (L) 2020     BMP:   Lab Results   Component Value Date     2020     2020    POTASSIUM 4.5 2020    POTASSIUM 5.0 2020    CHLORIDE 114 (H) 2020    CHLORIDE 115 (H) 2020    CO2 25 2020    CO2 24 2020    BUN 13 2020    BUN 10 2020    CR 1.19 2020    CR 1.33 (H) 2020    GLC 84 2020    GLC 78 2020     COAGS:   Lab Results   Component Value Date    PTT 44 (H) 10/29/2019    INR 1.99 (H) 2020    FIBR 181 (L) 03/10/2016     POC:   Lab Results   Component Value Date    BGM 77 2018     OTHER:   Lab Results   Component Value Date    LACT 1.4 2019    A1C 5.3 2008    JULISSA 8.9 2020    PHOS 2.5 2019    MAG 1.8 2019    ALBUMIN 2.6 (L) 2020    PROTTOTAL 5.9 (L) 2020    ALT 24 2020    AST 45 2020    GGT 3411 (H) 2004    ALKPHOS 156 (H) 2020    BILITOTAL 2.3 (H) 2020    LIPASE 206 2019    AMYLASE 66 2004    LUISITO 62 (H) 2019    TSH 1.22 2018    CRP <2.9 2020    SED 9 2020        Preop Vitals    BP Readings from Last 3 Encounters:   20 135/61   20 137/71   20 122/75    Pulse Readings from Last 3 Encounters:   20 78   20 84   20 80      Resp Readings from Last 3 Encounters:   20 16   20 16   20 16    SpO2 Readings from Last 3 Encounters:   20 100%  "  06/03/20 100%   03/03/20 99%      Temp Readings from Last 1 Encounters:   06/11/20 37  C (98.6  F) (Temporal)    Ht Readings from Last 1 Encounters:   06/03/20 1.778 m (5' 10\")      Wt Readings from Last 1 Encounters:   06/03/20 77.6 kg (171 lb)    Estimated body mass index is 24.54 kg/m  as calculated from the following:    Height as of 6/3/20: 1.778 m (5' 10\").    Weight as of 6/3/20: 77.6 kg (171 lb).     LDA:  Peripheral IV 06/11/20 Left Hand (Active)   Site Assessment WDL 06/11/20 0921   Line Status Infusing 06/11/20 0921   Phlebitis Scale 0-->no symptoms 06/11/20 0921   Dressing Intervention New dressing  06/11/20 0921   Number of days: 0        Past Medical History:   Diagnosis Date     Alcoholic cirrhosis (H)      Alcoholic hepatitis 03/2019     Gout      Hypertension      Hypertriglyceridemia      Left calcaneus fracture 1/9/2006 January 16, 2006: Fell 10 feet from ladder onto left foot on frozen ground on 1/9/06 at home.  Immediate pain and unable to walk- seen at [place] and diagnosed with calcaneus fracture     Portal vein thrombosis     left occlusion, partial main      Past Surgical History:   Procedure Laterality Date     ANKLE SURGERY Left      COLONOSCOPY N/A 3/31/2016    Procedure: COLONOSCOPY;  Surgeon: Rhys Uriostegui MD;  Location:  GI     ESOPHAGOSCOPY, GASTROSCOPY, DUODENOSCOPY (EGD), COMBINED N/A 3/31/2016    Procedure: COMBINED ESOPHAGOSCOPY, GASTROSCOPY, DUODENOSCOPY (EGD);  Surgeon: Rhys Uriostegui MD;  Location:  GI     ESOPHAGOSCOPY, GASTROSCOPY, DUODENOSCOPY (EGD), COMBINED N/A 3/9/2018    Procedure: COMBINED ESOPHAGOSCOPY, GASTROSCOPY, DUODENOSCOPY (EGD), BIOPSY SINGLE OR MULTIPLE;  EGD;  Surgeon: Gonzalo Wahl MD;  Location:  GI     ESOPHAGOSCOPY, GASTROSCOPY, DUODENOSCOPY (EGD), COMBINED N/A 6/7/2019    Procedure: ESOPHAGOGASTRODUODENOSCOPY (EGD);  Surgeon: Gonzalo Wahl MD;  Location: [facility]     IR PARACENTESIS  10/29/2019 "     IR TRANSVEN INTRAHEPATIC PORTOSYST REV  10/29/2019     KNEE SURGERY Left      KNEE SURGERY Right      SIGMOIDOSCOPY FLEXIBLE N/A 10/31/2017    Procedure: SIGMOIDOSCOPY FLEXIBLE;;  Surgeon: Armaan Adams MD;  Location: [facility]     TIPS Procedure  06/06/2018      Allergies   Allergen Reactions     Prednisone Visual Disturbance     Trazodone Visual Disturbance     Benadryl [Diphenhydramine] Other (See Comments)     Delirium (visual and auditory hallucinations)     Oxycodone Other (See Comments)     Delirium and constipation        Anesthesia Evaluation     .             ROS/MED HX    ENT/Pulmonary:  - neg pulmonary ROS     Neurologic:  - neg neurologic ROS     Cardiovascular:  - neg cardiovascular ROS   (+) hypertension----. : . . . :. .       METS/Exercise Tolerance:     Hematologic:  - neg hematologic  ROS       Musculoskeletal:  - neg musculoskeletal ROS       GI/Hepatic:  - neg GI/hepatic ROS   (+) hepatitis type Alcoholic, liver disease,       Renal/Genitourinary:  - ROS Renal section negative       Endo:  - neg endo ROS       Psychiatric:  - neg psychiatric ROS       Infectious Disease:  - neg infectious disease ROS       Malignancy:      - no malignancy   Other: Comment: Former Etoh abuse.  Sober since 2016   - neg other ROS                     PHYSICAL EXAM:   Mental Status/Neuro: A/A/O   Airway: Facies: Feasible  Mallampati: I  Mouth/Opening: Full  TM distance: > 6 cm  Neck ROM: Full   Respiratory: Auscultation: CTAB     Resp. Rate: Normal     Resp. Effort: Normal      CV: Rhythm: Regular  Rate: Age appropriate  Heart: Normal Sounds  Edema: None   Comments:      Dental: Normal Dentition                Assessment:   ASA SCORE: 3    H&P: History and physical reviewed and following examination; no interval change.   Smoking Status:  Non-Smoker/Unknown   NPO Status: NPO Appropriate     Plan:   Anes. Type:  MAC   Pre-Medication: None   Induction:  N/a   Airway: Native Airway   Access/Monitoring: PIV    Maintenance: Propofol Sedation     Postop Plan:   Postop Pain: None  Postop Sedation/Airway: Not planned  Disposition: Outpatient     PONV Management:   Adult Risk Factors:, Non-Smoker   Prevention: Ondansetron, Propofol     CONSENT: Direct conversation   Plan and risks discussed with: Patient   Blood Products: Consent Deferred (Minimal Blood Loss)                   Priyank Vu MD

## 2020-06-11 NOTE — OP NOTE
OPERATIVE / PROCEDURE    DATE OF SERVICE:  6/11/2020    PREOPERATIVE DIAGNOSIS   right hand, ring finger trigger finger.   Right hand, long finger trigger finger    POSTOPERATIVE DIAGNOSIS  right hand, ring finger trigger finger.   Right hand long trigger finger    OPERATIVE PROCEDURE   right hand, ring finger trigger finger release.   Right hand, long finger trigger release    SURGEON  Pascual Valencia M.D., M.S.     ANESTHESIA  Local with MAC    EBL: 2mL    IVF: 300mL    ANTIBIOTICS: cefazolin 2g iv    TOURNIQUET TIME: 10 minutes at 200 mmHg    COMPLICATIONS: None apparent.    SPECIMENS: none    DRAINS: none    IMPLANTS: none    INDICATIONS  This is a  54 year old year-old male with locking of the right hand, ring and long finger, consistent with a trigger finger. Having pain without locking of the right index and small fingers. The patient had failed conservative treatment including splints, therapy, and injections. Risks of surgery, including but not limited to: bleeding, infection, pain, scar, damage to adjacent structures (nerves, vessels, tendons), tendon injury or rupture, temporary vs permanent nerve damage, failure to relieve symptoms, recurrence of symptoms, need for further surgery, risks of anesthesia, and death were discussed. All questions were addressed to patient satisfaction. The patient elected to proceed with this surgery understanding the risks and perceived benefits. Informed consent was obtained for the procedure.      PROCEDURE IN DETAIL  The patient was identified in the preoperative holding area. The correct procedure and procedural site was confirmed with the patient. Consent was reviewed. The correct surgical site was marked with an indelible marker by the attending surgeon, Pascual Valencia MD.      The patient was then taken to the operating room and placed on the operating table in the supine position.  Tourniquet was placed around the proximal arm. All bony prominences well padded. IV  sedation was given by Anesthesia.  Local anesthetic using a combination of 0.25% Marcaine and 1% lidocaine was injected in the anticipated incision site in a sterile technique. The limb was prepped and draped in the usual sterile fashion. The limb was exsanguinated and tourniquet inflated to 200 mmHg.    An oblique incision was made in the flexion crease of the palm over the A1 pulley of the right long flexor tendon. Once through the dermal  layer, spreading technique was used and retractors inserted to avoid any  neurovascular injury. I then incised the palmar fascia and identified the  flexor tendon sheath. I identified the A1 pulley and incised it longitudinally with a #15  blade. I used a Nancy to make sure that it had been  completely, and then removed the loose flaps of the A1 pulley.  I also observed that there were some tendinous changes within the flexor tendon, right about the area of stenosis. Loose tendon fraying was sharply debrided.     A separate oblique incision was made in the flexion crease of the palm over the A1 pulley of the right ring flexor tendon. Once through the dermal  layer, spreading technique was used and retractors inserted to avoid any  neurovascular injury. I then incised the palmar fascia and identified the  flexor tendon sheath. I identified the A1 pulley and incised it longitudinally with a #15  blade. I used a Nancy to make sure that it had been  completely, and then removed the loose flaps of the A1 pulley.  I also observed that there were some tendinous changes within the flexor tendon, right about the area of stenosis. Loose tendon fraying was sharply debrided      The wounds were then copiously irrigated and the tourniquet deflated.  Minimal bleeders were encountered and hemostasis was achieved with bipolar electrocautery.     Then the wounds were closed with interrupted 4-0 nylon sutures placed in a  horizontal mattress fashion. Sterile dressings were  applied. The patient was then  transferred to the hospital cart and to the recovery area in stable condition.      Pascual Valencia M.D., M.S.  Dept. of Orthopaedic Surgery  [facility]

## 2020-06-11 NOTE — BRIEF OP NOTE
[facility] Brief Operative Note    Pre-operative diagnosis: Trigger finger, acquired [M65.30]   Post-operative diagnosis right long and ring trigger finger    Procedure: Procedure(s):  RELEASE, TRIGGER FINGER, right ring and long finger   Surgeon(s): Surgeon(s) and Role:     * Pascual Valencia MD - Primary   Estimated blood loss: * No values recorded between 6/11/2020 10:42 AM and 6/11/2020 10:57 AM *    Specimens: * No specimens in log *   Findings: Right long and ring triggering, tenosynovitis.

## 2020-06-11 NOTE — ANESTHESIA CARE TRANSFER NOTE
Patient: Ivan Gonsior    Procedure(s):  RELEASE, TRIGGER FINGER, right ring and long finger    Diagnosis: Trigger finger, acquired [M65.30]  Diagnosis Additional Information: No value filed.    Anesthesia Type:   MAC     Note:  Airway :Face Mask  Patient transferred to:Phase II  Handoff Report: Identifed the Patient, Identified the Reponsible Provider, Reviewed the pertinent medical history, Discussed the surgical course, Reviewed Intra-OP anesthesia mangement and issues during anesthesia, Set expectations for post-procedure period and Allowed opportunity for questions and acknowledgement of understanding      Vitals: (Last set prior to Anesthesia Care Transfer)    CRNA VITALS  6/11/2020 1039 - 6/11/2020 1116      6/11/2020             Pulse:  67    SpO2:  98 %                Electronically Signed By: GILLIAN Burton CRNA  June 11, 2020  11:16 AM

## 2020-06-12 ENCOUNTER — PATIENT OUTREACH (OUTPATIENT)
Dept: GASTROENTEROLOGY | Facility: CLINIC | Age: 55
End: 2020-06-12

## 2020-06-12 NOTE — PROGRESS NOTES
Patient's wife called and is inquiring Dr. Bales's thoughts regarding the pain medication tramadol patient was prescribed after surgery for trigger finger. Per Dr. Bales, if patient can avoid it, that would be best, since he has reacted poorly in the past to these types of medications. He can take tylenol as needed for pain, maximum of 2000 mg daily. Patient's wife verbalized understanding, and states he has just taken it once at bedtime, and seemed to do okay overnight. He will use tylenol today as needed.

## 2020-06-13 ENCOUNTER — NURSE TRIAGE (OUTPATIENT)
Dept: NURSING | Facility: CLINIC | Age: 55
End: 2020-06-13

## 2020-06-13 NOTE — TELEPHONE ENCOUNTER
Had surgery on his hand on 6/11/2020.    Wanting to know if what he is feeling is normal.    Has some swelling and pain    No numbness or tingling    Advised that he keep elevating the hand, using the ice every hour.    Not unusual for the pain to spike on day 3-4.    COVID 19 Nurse Triage Plan/Patient Instructions    Please be aware that novel coronavirus (COVID-19) may be circulating in the community. If you develop symptoms such as fever, cough, or SOB or if you have concerns about the presence of another infection including coronavirus (COVID-19), please contact your health care provider or visit [email or web address].     Disposition/Instructions    Patient to stay at home and follow home care protocol based instructions.     Thank you for taking steps to prevent the spread of this virus.  o Limit your contact with others.  o Wear a simple mask to cover your cough.  o Wash your hands well and often.    Resources    [facility]: About COVID-19: [email or web address]    CDC: What to Do If You're Sick: www.cdc.gov/coronavirus/2019-ncov/about/steps-when-sick.html    CDC: Ending Home Isolation: www.cdc.gov/coronavirus/2019-ncov/hcp/disposition-in-home-patients.html     CDC: Caring for Someone: www.cdc.gov/coronavirus/2019-ncov/if-you-are-sick/care-for-someone.html     [facility]: Interim Guidance for Hospital Discharge to Home: www.health.[facility].[place]/diseases/coronavirus/hcp/hospdischarge.pdf    [facility] clinical trials (COVID-19 research studies): [email or web address].[facility]-clinical-trials     Below are the COVID-19 hotlines at the [place] Department of Health ([facility]). Interpreters are available.   o For health questions: Call 361-853-2324 or 1-932.674.8455 (7 a.m. to 7 p.m.)  o For questions about schools and childcare: Call 887-610-6785 or 1-604.643.9850 (7 a.m. to 7 p.m.)           Elena Rasmussen RN          Additional Information    Negative: Sounds like a life-threatening emergency to the  triager    Negative: [1] SEVERE post-op pain (e.g., excruciating, pain scale 8-10) AND [2] not controlled with pain medications    Negative: [1] Caller has URGENT question AND [2] triager unable to answer question    Negative: [1] Headache AND [2] after spinal (epidural) anesthesia AND [3] not severe    Negative: Fever present > 3 days (72 hours)    Other post-op symptom or question    Protocols used: POST-OP SYMPTOMS AND [id number]-PA

## 2020-06-26 ENCOUNTER — OFFICE VISIT (OUTPATIENT)
Dept: ORTHOPEDICS | Facility: CLINIC | Age: 55
End: 2020-06-26
Payer: COMMERCIAL

## 2020-06-26 VITALS
DIASTOLIC BLOOD PRESSURE: 70 MMHG | HEIGHT: 70 IN | SYSTOLIC BLOOD PRESSURE: 134 MMHG | BODY MASS INDEX: 25.05 KG/M2 | WEIGHT: 175 LBS

## 2020-06-26 DIAGNOSIS — Z98.890 S/P TRIGGER FINGER RELEASE: Primary | ICD-10-CM

## 2020-06-26 PROCEDURE — 99024 POSTOP FOLLOW-UP VISIT: CPT | Performed by: ORTHOPAEDIC SURGERY

## 2020-06-26 ASSESSMENT — PAIN SCALES - GENERAL: PAINLEVEL: MODERATE PAIN (5)

## 2020-06-26 ASSESSMENT — MIFFLIN-ST. JEOR: SCORE: 1630.04

## 2020-06-26 NOTE — PROGRESS NOTES
chief complaint:   Chief Complaint   Patient presents with     Right Hand - Pain     Right long and ring finger trigger finger releases. DOS. 6/11/20. Has kept incisions dry, clean and covered. No issues. Long finger is pretty sore.        SURGERY: Right ring finger trigger release, long finger trigger release ([facility] [place])  DATE OF SURGERY: 6/11/2020      HISTORY:  Ivan Bell is a 54 year old male , Right -hand dominant who returns for postoperative visit of Right ring and long finger trigger release. He returns now 2 weeks following surgery. Since surgery, no issues. Denies fevers, chills, night sweats. Denies numbness or tingling. Pain has been controlled, most pain in long finger as prior to surgery. No locking noted. Hasn't been doing much with the hand.    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            Other PMH:  has a past medical history of Alcoholic cirrhosis (H), Alcoholic hepatitis (03/2019), Gout, Hypertension, Hypertriglyceridemia, Left calcaneus fracture (1/9/2006), and Portal vein thrombosis.  Patient Active Problem List    Diagnosis Date Noted     Thrombocytopenia (H) 06/05/2020     Priority: Medium     Trigger finger, acquired 06/01/2020     Priority: Medium     Added automatically from request for surgery [id number]       CKD (chronic kidney disease) stage 2, GFR 60-89 ml/min 04/17/2020     Priority: Medium     Seasonal allergic rhinitis, unspecified trigger 04/17/2020     Priority: Medium     Hypokalemia 12/31/2019     Priority: Medium     Decompensation of cirrhosis of liver (H) 03/10/2019     Priority: Medium     Calculus of gallbladder without cholecystitis 09/23/2018     Priority: Medium     September 23, 2018 non-obstructing, incidental finding on us.        Nephrolithiasis 09/23/2018     Priority: Medium     September 23, 2018 non-obstructing, incident finding on us.        Hepatic encephalopathy (H) 06/10/2018     Priority: Medium      Alcoholic cirrhosis of liver with ascites (H) 03/08/2016     Priority: Medium     September 23, 2018 now sober, ascites resolved, S/P TIP procedure 6/2018. Normal liver enzymes, diminished liver function.  INR 1.6, bilirubin 2.5, albumin 2.6. He  appears healthy. Doing well. Stressed the importance of abstaining from alcohol. See copy of recent us.     9/10/18:  Impression:   1. Patent TIPS with appropriate in stent velocities. Normal Doppler  evaluation of the remainder of the hepatic vasculature in the setting  of TIPS.  2. Cirrhotic hepatic morphology with evidence of portal hypertension,  including splenomegaly. No focal hepatic mass.  3. Cholelithiasis without evidence of cholecystitis.  4. Bilateral nonobstructing nephrolithiasis.            Hypertension goal BP (blood pressure) < 140/90 12/18/2012     Priority: Medium     24 hour contact given to patient  01/02/2012     Priority: Medium     EMERGENCY CARE PLAN  Presenting Problem Signs and Symptoms Treatment Plan    Questions or conerns during clinic hours    I will call the clinic directly     Questions or conerns outside clinic hours    I will call the 24 hour nurse line at [phone]    Patient needs to schedule an appointment    I will call the 24 hour scheduling team at [phone] or clinic directly    Same day treatment     I will call the clinic first, nurse line if after hours, urgent care and express care if needed                                 CARDIOVASCULAR SCREENING; LDL GOAL LESS THAN 130 10/31/2010     Priority: Medium     Erectile dysfunction 01/29/2008     Priority: Medium     September 23, 2018 no known CAD, no contraindications to sildenafil.        Gouty arthropathy 12/31/2006     Priority: Medium     Problem list name updated by automated process. Provider to review and confirm  Im[employer] Update utility       Hypertriglyceridemia 08/03/2005     Priority: Medium     Last Exam: December 19, 2007  Last Lipids: CHOL      211   01/25/2007 LDL       104   01/25/2007 HDL       83   01/25/2007  Last LFTs:: AST      106   01/25/2007   ALT       53   01/25/2007    TRIG      120   01/25/2007  TRIG      115   01/16/2006  Meds: Lopid 600 bid - tolerating         Surgical Hx:  has a past surgical history that includes Colonoscopy (N/A, 3/31/2016); knee surgery (Left); knee surgery (Right); Ankle surgery (Left); Esophagoscopy, gastroscopy, duodenoscopy (EGD), combined (N/A, 3/31/2016); Sigmoidoscopy flexible (N/A, 10/31/2017); Esophagoscopy, gastroscopy, duodenoscopy (EGD), combined (N/A, 3/9/2018); TIPS Procedure (06/06/2018); Esophagoscopy, gastroscopy, duodenoscopy (EGD), combined (N/A, 6/7/2019); IR Paracentesis (10/29/2019); and IR Transven Intrahepatic Portosyst Rev (10/29/2019).    Medications:   Current Outpatient Medications:      acetaminophen (TYLENOL) 500 MG tablet, Take 1,000 mg by mouth every 6 hours as needed for mild pain, Disp: , Rfl:      Ascorbic Acid (VITAMIN C PO), Take by mouth daily, Disp: , Rfl:      fexofenadine (ALLEGRA) 60 MG tablet, Take 1 tablet (60 mg) by mouth daily, Disp: 30 tablet, Rfl: 1     furosemide (LASIX) 20 MG tablet, Take 1 tablet (20 mg) by mouth daily, Disp: 90 tablet, Rfl: 3     lactulose (CONSTULOSE) 10 GM/15ML solution, Take 30 mLs (20 g) by mouth daily, Disp: , Rfl:      Menaquinone-7 (VITAMIN K2) 100 MCG CAPS, Take 200 mcg by mouth daily , Disp: , Rfl:      MULTIPLE VITAMINS PO, Take 1 tablet by mouth daily, Disp: , Rfl:      potassium chloride ER (K-DUR/KLOR-CON M) 20 MEQ CR tablet, Take 2 tablets (40 mEq) by mouth daily, Disp: 120 tablet, Rfl: 3     rifaximin (XIFAXAN) 550 MG TABS tablet, Take 1 tablet (550 mg) by mouth 2 times daily, Disp: 180 tablet, Rfl: 0     senna-docusate (SENOKOT-S/PERICOLACE) 8.6-50 MG tablet, Take 1-2 tablets by mouth 2 times daily, Disp: 30 tablet, Rfl: 0     sertraline (ZOLOFT) 50 MG tablet, TAKE 1/2 TABLET BY MOUTH ONCE DAILY, Disp: 30 tablet, Rfl: 3     sodium bicarbonate 650 MG tablet,  "Take 1 tablet (650 mg) by mouth 2 times daily, Disp: 180 tablet, Rfl: 3     spironolactone (ALDACTONE) 50 MG tablet, Take 1 tablet (50 mg) by mouth daily, Disp: 90 tablet, Rfl: 3     Thiamine HCl (B-1) 100 MG TABS, Take 1 tablet by mouth daily, Disp: 90 tablet, Rfl: 3     vitamin D3 (CHOLECALCIFEROL) 2000 units (50 mcg) tablet, Take 1 tablet by mouth daily, Disp: , Rfl:     Allergies:   Allergies   Allergen Reactions     Prednisone Visual Disturbance     Trazodone Visual Disturbance     Benadryl [Diphenhydramine] Other (See Comments)     Delirium (visual and auditory hallucinations)     Oxycodone Other (See Comments)     Delirium and constipation           REVIEW OF SYSTEMS:    Denies numbness, tingling, parasthesias.   Denies headaches.   Denies fevers, chills, night sweats   Denies chest pain.   Denies shortness of breath.   Denies any skin problems, abrasions, rashes, irritation.      EXAM:  /70   Ht 1.778 m (5' 10\")   Wt 79.4 kg (175 lb)   BMI 25.11 kg/m      GENERAL APPEARANCE: healthy, alert and no distress   GAIT: NORMAL  SKIN: no suspicious lesions or rashes  RESPIRATORY: No increased work of breathing.  PSYCH:  mentation appears normal and affect normal/bright, not anxious.    MUSCULOSKELETAL:    Right HAND/FINGERS:   Skin intact. Normal wear pattern, color and tone.   Incision/wounds healing well with skin edges well approximated. No erythema or drainage. Sutures in place.  No palpable triggering noted at A1 pulley of Right long (middle) and ring finger.  Moderate tender to palpation  at incisions.  There is mild swelling in the hand, all fingers.  There is resolving palmar ecchymosis.  There is no erythema of the surrounding skin.  There is no maceration of the skin.  There is no deformity in the area.    Intact EPL, FPL, FDP, EDC, wrist flexion/extension, biceps/triceps  Intact sensation to light touch in median, radial and ulnar nerve distributions of the hand and specifically the radial and " ulnar digital nerves of the Right long (middle) and ring finger.      XRAYS: none indicated.    ASSESSMENT: 54 year old male , Right -hand dominant,  2 weeks s/p trigger release of Right long (middle) and ring finger, doing well.      PLAN: routine postoperative trigger release  * sutures removed  * ok to get incision wet. No soaking for another week  * gradual return to use of hand and fingers  * scar massage and desensitization.  * finger range of motion, flexion and extension to prevent contracture and build up of scar tissues.  * return to clinic as needed.      Pascual Valencia M.D., M.S.  Dept. of Orthopaedic Surgery  [facility]

## 2020-06-26 NOTE — LETTER
6/26/2020         RE: Ivan Bell  [address] [facility] [id number]        Dear Colleague,    Thank you for referring your patient, Ivan Bell, to the [facility]. Please see a copy of my visit note below.    chief complaint:   Chief Complaint   Patient presents with     Right Hand - Pain     Right long and ring finger trigger finger releases. DOS. 6/11/20. Has kept incisions dry, clean and covered. No issues. Long finger is pretty sore.        SURGERY: Right ring finger trigger release, long finger trigger release ([facility])  DATE OF SURGERY: 6/11/2020      HISTORY:  Ivan Bell is a 54 year old male , Right -hand dominant who returns for postoperative visit of Right ring and long finger trigger release. He returns now 2 weeks following surgery. Since surgery, no issues. Denies fevers, chills, night sweats. Denies numbness or tingling. Pain has been controlled, most pain in long finger as prior to surgery. No locking noted. Hasn't been doing much with the hand.    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            Other PMH:  has a past medical history of Alcoholic cirrhosis (H), Alcoholic hepatitis (03/2019), Gout, Hypertension, Hypertriglyceridemia, Left calcaneus fracture (1/9/2006), and Portal vein thrombosis.  Patient Active Problem List    Diagnosis Date Noted     Thrombocytopenia (H) 06/05/2020     Priority: Medium     Trigger finger, acquired 06/01/2020     Priority: Medium     Added automatically from request for surgery [id number]       CKD (chronic kidney disease) stage 2, GFR 60-89 ml/min 04/17/2020     Priority: Medium     Seasonal allergic rhinitis, unspecified trigger 04/17/2020     Priority: Medium     Hypokalemia 12/31/2019     Priority: Medium     Decompensation of cirrhosis of liver (H) 03/10/2019     Priority: Medium     Calculus of gallbladder without cholecystitis 09/23/2018     Priority:  Medium     September 23, 2018 non-obstructing, incidental finding on us.        Nephrolithiasis 09/23/2018     Priority: Medium     September 23, 2018 non-obstructing, incident finding on us.        Hepatic encephalopathy (H) 06/10/2018     Priority: Medium     Alcoholic cirrhosis of liver with ascites (H) 03/08/2016     Priority: Medium     September 23, 2018 now sober, ascites resolved, S/P TIP procedure 6/2018. Normal liver enzymes, diminished liver function.  INR 1.6, bilirubin 2.5, albumin 2.6. He  appears healthy. Doing well. Stressed the importance of abstaining from alcohol. See copy of recent us.     9/10/18:  Impression:   1. Patent TIPS with appropriate in stent velocities. Normal Doppler  evaluation of the remainder of the hepatic vasculature in the setting  of TIPS.  2. Cirrhotic hepatic morphology with evidence of portal hypertension,  including splenomegaly. No focal hepatic mass.  3. Cholelithiasis without evidence of cholecystitis.  4. Bilateral nonobstructing nephrolithiasis.            Hypertension goal BP (blood pressure) < 140/90 12/18/2012     Priority: Medium     24 hour contact given to patient  01/02/2012     Priority: Medium     EMERGENCY CARE PLAN  Presenting Problem Signs and Symptoms Treatment Plan    Questions or conerns during clinic hours    I will call the clinic directly     Questions or conerns outside clinic hours    I will call the 24 hour nurse line at [phone]    Patient needs to schedule an appointment    I will call the 24 hour scheduling team at [phone] or clinic directly    Same day treatment     I will call the clinic first, nurse line if after hours, urgent care and express care if needed                                 CARDIOVASCULAR SCREENING; LDL GOAL LESS THAN 130 10/31/2010     Priority: Medium     Erectile dysfunction 01/29/2008     Priority: Medium     September 23, 2018 no known CAD, no contraindications to sildenafil.        Gouty arthropathy 12/31/2006      Priority: Medium     Problem list name updated by automated process. Provider to review and confirm  [employer] Update utility       Hypertriglyceridemia 08/03/2005     Priority: Medium     Last Exam: December 19, 2007  Last Lipids: CHOL      211   01/25/2007 LDL      104   01/25/2007 HDL       83   01/25/2007  Last LFTs:: AST      106   01/25/2007   ALT       53   01/25/2007    TRIG      120   01/25/2007  TRIG      115   01/16/2006  Meds: Lopid 600 bid - tolerating         Surgical Hx:  has a past surgical history that includes Colonoscopy (N/A, 3/31/2016); knee surgery (Left); knee surgery (Right); Ankle surgery (Left); Esophagoscopy, gastroscopy, duodenoscopy (EGD), combined (N/A, 3/31/2016); Sigmoidoscopy flexible (N/A, 10/31/2017); Esophagoscopy, gastroscopy, duodenoscopy (EGD), combined (N/A, 3/9/2018); TIPS Procedure (06/06/2018); Esophagoscopy, gastroscopy, duodenoscopy (EGD), combined (N/A, 6/7/2019); IR Paracentesis (10/29/2019); and IR Transven Intrahepatic Portosyst Rev (10/29/2019).    Medications:   Current Outpatient Medications:      acetaminophen (TYLENOL) 500 MG tablet, Take 1,000 mg by mouth every 6 hours as needed for mild pain, Disp: , Rfl:      Ascorbic Acid (VITAMIN C PO), Take by mouth daily, Disp: , Rfl:      fexofenadine (ALLEGRA) 60 MG tablet, Take 1 tablet (60 mg) by mouth daily, Disp: 30 tablet, Rfl: 1     furosemide (LASIX) 20 MG tablet, Take 1 tablet (20 mg) by mouth daily, Disp: 90 tablet, Rfl: 3     lactulose (CONSTULOSE) 10 GM/15ML solution, Take 30 mLs (20 g) by mouth daily, Disp: , Rfl:      Menaquinone-7 (VITAMIN K2) 100 MCG CAPS, Take 200 mcg by mouth daily , Disp: , Rfl:      MULTIPLE VITAMINS PO, Take 1 tablet by mouth daily, Disp: , Rfl:      potassium chloride ER (K-DUR/KLOR-CON M) 20 MEQ CR tablet, Take 2 tablets (40 mEq) by mouth daily, Disp: 120 tablet, Rfl: 3     rifaximin (XIFAXAN) 550 MG TABS tablet, Take 1 tablet (550 mg) by mouth 2 times daily, Disp: 180 tablet, Rfl:  "0     senna-docusate (SENOKOT-S/PERICOLACE) 8.6-50 MG tablet, Take 1-2 tablets by mouth 2 times daily, Disp: 30 tablet, Rfl: 0     sertraline (ZOLOFT) 50 MG tablet, TAKE 1/2 TABLET BY MOUTH ONCE DAILY, Disp: 30 tablet, Rfl: 3     sodium bicarbonate 650 MG tablet, Take 1 tablet (650 mg) by mouth 2 times daily, Disp: 180 tablet, Rfl: 3     spironolactone (ALDACTONE) 50 MG tablet, Take 1 tablet (50 mg) by mouth daily, Disp: 90 tablet, Rfl: 3     Thiamine HCl (B-1) 100 MG TABS, Take 1 tablet by mouth daily, Disp: 90 tablet, Rfl: 3     vitamin D3 (CHOLECALCIFEROL) 2000 units (50 mcg) tablet, Take 1 tablet by mouth daily, Disp: , Rfl:     Allergies:   Allergies   Allergen Reactions     Prednisone Visual Disturbance     Trazodone Visual Disturbance     Benadryl [Diphenhydramine] Other (See Comments)     Delirium (visual and auditory hallucinations)     Oxycodone Other (See Comments)     Delirium and constipation           REVIEW OF SYSTEMS:    Denies numbness, tingling, parasthesias.   Denies headaches.   Denies fevers, chills, night sweats   Denies chest pain.   Denies shortness of breath.   Denies any skin problems, abrasions, rashes, irritation.      EXAM:  /70   Ht 1.778 m (5' 10\")   Wt 79.4 kg (175 lb)   BMI 25.11 kg/m      GENERAL APPEARANCE: healthy, alert and no distress   GAIT: NORMAL  SKIN: no suspicious lesions or rashes  RESPIRATORY: No increased work of breathing.  PSYCH:  mentation appears normal and affect normal/bright, not anxious.    MUSCULOSKELETAL:    Right HAND/FINGERS:   Skin intact. Normal wear pattern, color and tone.   Incision/wounds healing well with skin edges well approximated. No erythema or drainage. Sutures in place.  No palpable triggering noted at A1 pulley of Right long (middle) and ring finger.  Moderate tender to palpation  at incisions.  There is mild swelling in the hand, all fingers.  There is resolving palmar ecchymosis.  There is no erythema of the surrounding skin.  There " is no maceration of the skin.  There is no deformity in the area.    Intact EPL, FPL, FDP, EDC, wrist flexion/extension, biceps/triceps  Intact sensation to light touch in median, radial and ulnar nerve distributions of the hand and specifically the radial and ulnar digital nerves of the Right long (middle) and ring finger.      XRAYS: none indicated.    ASSESSMENT: 54 year old male , Right -hand dominant,  2 weeks s/p trigger release of Right long (middle) and ring finger, doing well.      PLAN: routine postoperative trigger release  * sutures removed  * ok to get incision wet. No soaking for another week  * gradual return to use of hand and fingers  * scar massage and desensitization.  * finger range of motion, flexion and extension to prevent contracture and build up of scar tissues.  * return to clinic as needed.      Pascual Valencia M.D., M.S.  Dept. of Orthopaedic Surgery  [facility]    Again, thank you for allowing me to participate in the care of your patient.        Sincerely,        Pascual Valencia MD

## 2020-06-29 DIAGNOSIS — K76.82 HEPATIC ENCEPHALOPATHY (H): ICD-10-CM

## 2020-06-29 NOTE — TELEPHONE ENCOUNTER
Routing refill request to provider for review/approval because:  Drug not on the [facility] refill protocol   Alexa Donis RN

## 2020-07-09 ENCOUNTER — HOSPITAL ENCOUNTER (OUTPATIENT)
Dept: MRI IMAGING | Facility: CLINIC | Age: 55
Discharge: HOME | End: 2020-07-09
Attending: NURSE PRACTITIONER | Admitting: NURSE PRACTITIONER
Payer: COMMERCIAL

## 2020-07-09 ENCOUNTER — OFFICE VISIT (OUTPATIENT)
Dept: FAMILY MEDICINE | Facility: CLINIC | Age: 55
End: 2020-07-09
Payer: COMMERCIAL

## 2020-07-09 VITALS
SYSTOLIC BLOOD PRESSURE: 138 MMHG | DIASTOLIC BLOOD PRESSURE: 72 MMHG | RESPIRATION RATE: 15 BRPM | BODY MASS INDEX: 24.68 KG/M2 | HEART RATE: 78 BPM | TEMPERATURE: 97.8 F | WEIGHT: 172 LBS

## 2020-07-09 DIAGNOSIS — M25.561 ACUTE PAIN OF RIGHT KNEE: Primary | ICD-10-CM

## 2020-07-09 DIAGNOSIS — M25.561 ACUTE PAIN OF RIGHT KNEE: ICD-10-CM

## 2020-07-09 PROCEDURE — 99213 OFFICE O/P EST LOW 20 MIN: CPT | Performed by: NURSE PRACTITIONER

## 2020-07-09 PROCEDURE — 73721 MRI JNT OF LWR EXTRE W/O DYE: CPT | Mod: RT

## 2020-07-09 RX ORDER — NAPROXEN 500 MG/1
TABLET ORAL
Qty: 40 TABLET | Refills: 0 | Status: SHIPPED | OUTPATIENT
Start: 2020-07-09 | End: 2020-10-12

## 2020-07-09 ASSESSMENT — ENCOUNTER SYMPTOMS
SHORTNESS OF BREATH: 0
NAUSEA: 0
HEADACHES: 0
DIAPHORESIS: 0
EYE DISCHARGE: 0
FATIGUE: 0
FEVER: 0
SINUS PRESSURE: 0
COUGH: 0
NUMBNESS: 1
RHINORRHEA: 0
VOMITING: 0
WHEEZING: 0
SORE THROAT: 0
DIARRHEA: 0
ARTHRALGIAS: 1

## 2020-07-09 NOTE — PATIENT INSTRUCTIONS
No additional over the counter ibuprofen, Advil, naproxen, aleve. Ok to take Tylenol or acetaminophen.     Its okay to take this medication with the Zoloft short term.     Continue with the ice three times per day for 20 minutes

## 2020-07-09 NOTE — PROGRESS NOTES
Ananda Bell is a 54 year old male who presents to clinic today for the following health issues:    HPI   Joint Pain    Onset: 1 week ago    Description:   Location: right knee  Character: Sharp, Dull ache and Burning    Intensity: mild, moderate    Progression of Symptoms: better    Accompanying Signs & Symptoms:  Other symptoms:slight swelling    History:   Previous similar pain: YES- hx 8-10 years ago had  Surgery on R knee    Precipitating factors:   Trauma or overuse: YE- 1 week ago had sudden pain when hopped out    Alleviating factors:  Improved by:Tylenol    Therapies Tried and outcome: None    Sosa Miles CMA    Current Outpatient Medications   Medication Sig Dispense Refill     acetaminophen (TYLENOL) 500 MG tablet Take 1,000 mg by mouth every 6 hours as needed for mild pain       Ascorbic Acid (VITAMIN C PO) Take by mouth daily       fexofenadine (ALLEGRA) 60 MG tablet Take 1 tablet (60 mg) by mouth daily 30 tablet 1     furosemide (LASIX) 20 MG tablet Take 1 tablet (20 mg) by mouth daily 90 tablet 3     lactulose (CONSTULOSE) 10 GM/15ML solution Take 30 mLs (20 g) by mouth daily       Menaquinone-7 (VITAMIN K2) 100 MCG CAPS Take 200 mcg by mouth daily        MULTIPLE VITAMINS PO Take 1 tablet by mouth daily       naproxen (NAPROSYN) 500 MG tablet Take 1 pill twice per day with food for 10 days then every 12 hours as needed 40 tablet 0     potassium chloride ER (K-DUR/KLOR-CON M) 20 MEQ CR tablet Take 2 tablets (40 mEq) by mouth daily 120 tablet 3     rifaximin (XIFAXAN) 550 MG TABS tablet Take 1 tablet (550 mg) by mouth 2 times daily 180 tablet 0     senna-docusate (SENOKOT-S/PERICOLACE) 8.6-50 MG tablet Take 1-2 tablets by mouth 2 times daily 30 tablet 0     sertraline (ZOLOFT) 50 MG tablet TAKE 1/2 TABLET BY MOUTH ONCE DAILY 30 tablet 3     sodium bicarbonate 650 MG tablet Take 1 tablet (650 mg) by mouth 2 times daily 180 tablet 3     spironolactone (ALDACTONE) 50 MG tablet Take 1  tablet (50 mg) by mouth daily 90 tablet 3     Thiamine HCl (B-1) 100 MG TABS Take 1 tablet by mouth daily 90 tablet 3     vitamin D3 (CHOLECALCIFEROL) 2000 units (50 mcg) tablet Take 1 tablet by mouth daily       Allergies   Allergen Reactions     Prednisone Visual Disturbance     Trazodone Visual Disturbance     Benadryl [Diphenhydramine] Other (See Comments)     Delirium (visual and auditory hallucinations)     Oxycodone Other (See Comments)     Delirium and constipation     Reviewed and updated as needed this visit by Provider       About 1 week ago hopped out of a bobcat and that is when right knee pain started. Does have some numbness to area to inner knee and down a bit. Went to kneel down last night and when went to get up hurt very badly. 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 Has been using some ice and that does help some. Is also taking some Tylenol but that really does not help to decrease the pain. Has burning type pain on the inside. When goes to get up will be really bad. Has previously had surgery to this knee, to have mensicus removed.       Review of Systems   Constitutional: Negative for diaphoresis, fatigue and fever.   HENT: Negative for congestion, ear pain, rhinorrhea, sinus pressure and sore throat.    Eyes: Negative for discharge.   Respiratory: Negative for cough, shortness of breath and wheezing.    Cardiovascular: Negative for chest pain.   Gastrointestinal: Negative for diarrhea, nausea and vomiting.   Musculoskeletal: Positive for arthralgias (right knee pain).   Neurological: Positive for numbness (inside of right knee). Negative for headaches.         Objective    /72   Pulse 78   Temp 97.8  F (36.6  C) (Tympanic)   Resp 15   Wt 78 kg (172 lb)   BMI 24.68 kg/m    Body mass index is 24.68 kg/m .  Physical Exam  Constitutional:       Appearance: He is well-developed.   Cardiovascular:      Rate and Rhythm: Normal rate and regular rhythm.      Heart sounds: Normal heart sounds.   Pulmonary:       Effort: Pulmonary effort is normal.      Breath sounds: Normal breath sounds.   Musculoskeletal:      Right knee: He exhibits decreased range of motion and swelling. He exhibits no effusion, no ecchymosis, no deformity and no erythema. Tenderness found. Medial joint line and MCL tenderness noted.   Skin:     General: Skin is warm and dry.   Neurological:      Mental Status: He is alert.             Assessment & Plan     1. Acute pain of right knee  Due to severity of pain will plan to obtain MRI.  Educated on use of naproxen.  Encouraged to use ice 3 times daily.  Will refer to orthopedics.  Apply Ace bandage.  - MR Knee Right w/o Contrast; Future  - Orthopedic & Spine  Referral; Future  - naproxen (NAPROSYN) 500 MG tablet; Take 1 pill twice per day with food for 10 days then every 12 hours as needed  Dispense: 40 tablet; Refill: 0      Return in about 2 weeks (around 7/23/2020), or if symptoms worsen or fail to improve.    GILLIAN Pereyra [facility]

## 2020-07-14 NOTE — PROGRESS NOTES
Sports Medicine Clinic Visit    PCP: [place]    CC: Patient presents with:  Right Knee - Pain      HPI:  Ivan Bell is a 54 year old male who is seen in consultation at the request of Jenifer Forde NP.   He notes right knee pain that began 10 days when he hopped out of a bobcat. He is unsure if he felt a pop, pain gradually worsened. He notes the pain started in the back of the knee and has migrated around to the inside of the knee.   He rates the pain at a 10/10 at its worst and a 7/10 currently.  Symptoms are relieved with compression slightly. Symptoms are worsened by walking, flexion, extension, kneeling, and getting up from the floor. He endorses swelling, popping, grinding, catching, instability and weakness.   He denies bruising and locking.  Other treatment has included ice and Tylenol. He notes difficulty with sleeping at night. He notes a burning sensation in the knee at night.  He has had 3 surgeries on the right knee and one on the left.  All related to the menisci.      Review of Systems:  Musculoskeletal: as above  Remainder of review of systems is negative including constitutional, eyes, ENT, CV, pulmonary, GI, , endocrine, skin, hematologic, and neurologic except as noted in HPI or medical history.    History reviewed. No pertinent past surgical/medical/family/social history other than as mentioned in HPI.    Patient Active Problem List   Diagnosis     Hypertriglyceridemia     Gouty arthropathy     Erectile dysfunction     CARDIOVASCULAR SCREENING; LDL GOAL LESS THAN 130     24 hour contact given to patient      Hypertension goal BP (blood pressure) < 140/90     Alcoholic cirrhosis of liver with ascites (H)     Hepatic encephalopathy (H)     Calculus of gallbladder without cholecystitis     Nephrolithiasis     Decompensation of cirrhosis of liver (H)     Hypokalemia     CKD (chronic kidney disease) stage 2, GFR 60-89 ml/min     Seasonal allergic rhinitis, unspecified trigger      Trigger finger, acquired     Thrombocytopenia (H)     Past Medical History:   Diagnosis Date     Alcoholic cirrhosis (H)      Alcoholic hepatitis 03/2019     Gout      Hypertension      Hypertriglyceridemia      Left calcaneus fracture 1/9/2006 January 16, 2006: Fell 10 feet from ladder onto left foot on frozen ground on 1/9/06 at home.  Immediate pain and unable to walk- seen at [place] and diagnosed with calcaneus fracture     Portal vein thrombosis     left occlusion, partial main     Past Surgical History:   Procedure Laterality Date     ANKLE SURGERY Left      COLONOSCOPY N/A 3/31/2016    Procedure: COLONOSCOPY;  Surgeon: Rhys Uriostegui MD;  Location:  GI     ESOPHAGOSCOPY, GASTROSCOPY, DUODENOSCOPY (EGD), COMBINED N/A 3/31/2016    Procedure: COMBINED ESOPHAGOSCOPY, GASTROSCOPY, DUODENOSCOPY (EGD);  Surgeon: Rhys Uriostegui MD;  Location:  GI     ESOPHAGOSCOPY, GASTROSCOPY, DUODENOSCOPY (EGD), COMBINED N/A 3/9/2018    Procedure: COMBINED ESOPHAGOSCOPY, GASTROSCOPY, DUODENOSCOPY (EGD), BIOPSY SINGLE OR MULTIPLE;  EGD;  Surgeon: Gonzalo Wahl MD;  Location:  GI     ESOPHAGOSCOPY, GASTROSCOPY, DUODENOSCOPY (EGD), COMBINED N/A 6/7/2019    Procedure: ESOPHAGOGASTRODUODENOSCOPY (EGD);  Surgeon: Gonzalo Wahl MD;  Location:  GI     IR PARACENTESIS  10/29/2019     IR TRANSVEN INTRAHEPATIC PORTOSYST REV  10/29/2019     KNEE SURGERY Left      KNEE SURGERY Right      RELEASE TRIGGER FINGER Right 6/11/2020    Procedure: RELEASE, TRIGGER FINGER, right ring and long finger;  Surgeon: Pascual Valencia MD;  Location: [facility]     SIGMOIDOSCOPY FLEXIBLE N/A 10/31/2017    Procedure: SIGMOIDOSCOPY FLEXIBLE;;  Surgeon: Armaan Adams MD;  Location:  GI     TIPS Procedure  06/06/2018     Family History   Problem Relation Age of Onset     Family History Negative Mother      Family History Negative Father      Hypertension Father      Cerebrovascular Disease Father 87      Breast Cancer Maternal Grandmother      Rheumatoid Arthritis Daughter      Depression Daughter      Cancer - colorectal No family hx of      Prostate Cancer No family hx of      Liver Disease No family hx of      Social History     Socioeconomic History     Marital status:      Spouse name: Not on file     Number of children: Not on file     Years of education: Not on file     Highest education level: Not on file   Occupational History     Not on file   Social Needs     Financial resource strain: Not on file     Food insecurity     Worry: Not on file     Inability: Not on file     Transportation needs     Medical: Not on file     Non-medical: Not on file   Tobacco Use     Smoking status: Former Smoker     Packs/day: 0.00     Types: Dip, chew, snus or snuff     Last attempt to quit: 1998     Years since quittin.8     Smokeless tobacco: Current User     Types: Chew     Tobacco comment: 1 tin per 10 days.   Substance and Sexual Activity     Alcohol use: No     Alcohol/week: 17.5 standard drinks     Types: 21 Cans of beer per week     Comment: last etoh 16, did have Odouls ~2017     Drug use: No     Sexual activity: Not on file   Lifestyle     Physical activity     Days per week: Not on file     Minutes per session: Not on file     Stress: Not on file   Relationships     Social connections     Talks on phone: Not on file     Gets together: Not on file     Attends [employer] service: Not on file     Active member of club or organization: Not on file     Attends meetings of clubs or organizations: Not on file     Relationship status: Not on file     Intimate partner violence     Fear of current or ex partner: Not on file     Emotionally abused: Not on file     Physically abused: Not on file     Forced sexual activity: Not on file   Other Topics Concern     Parent/sibling w/ CABG, MI or angioplasty before 65F 55M? No   Social History Narrative     Not on file       He is self employed, [employer].  "    Current Outpatient Medications   Medication     acetaminophen (TYLENOL) 500 MG tablet     Ascorbic Acid (VITAMIN C PO)     fexofenadine (ALLEGRA) 60 MG tablet     furosemide (LASIX) 20 MG tablet     lactulose (CONSTULOSE) 10 GM/15ML solution     Menaquinone-7 (VITAMIN K2) 100 MCG CAPS     MULTIPLE VITAMINS PO     naproxen (NAPROSYN) 500 MG tablet     order for DME     potassium chloride ER (K-DUR/KLOR-CON M) 20 MEQ CR tablet     rifaximin (XIFAXAN) 550 MG TABS tablet     senna-docusate (SENOKOT-S/PERICOLACE) 8.6-50 MG tablet     sertraline (ZOLOFT) 50 MG tablet     sodium bicarbonate 650 MG tablet     spironolactone (ALDACTONE) 50 MG tablet     Thiamine HCl (B-1) 100 MG TABS     vitamin D3 (CHOLECALCIFEROL) 2000 units (50 mcg) tablet     No current facility-administered medications for this visit.      Allergies   Allergen Reactions     Prednisone Visual Disturbance     Trazodone Visual Disturbance     Benadryl [Diphenhydramine] Other (See Comments)     Delirium (visual and auditory hallucinations)     Oxycodone Other (See Comments)     Delirium and constipation         Objective:  /68   Ht 1.778 m (5' 10\")   Wt 78 kg (172 lb)   BMI 24.68 kg/m      General: Alert and in no distress    Head: Normocephalic, atraumatic  Eyes: no scleral icterus or conjunctival erythema   Skin: no erythema, petechiae, or jaundice  CV: regular rhythm by palpation, 2+ distal pulses  Resp: normal respiratory effort without conversational dyspnea   Psych: normal mood and affect    Gait: Antalgic  Neuro: Motor strength and sensation as noted below    Musculoskeletal:    Bilateral Knee exam    Inspection:  No erythema, ecchymosis, or warmth.  Right knee effusion.    Palpation: Tender to palpation over the right medial knee and popliteal fossa.    Knee ROM: Right knee flexion and extension decreased and painful.    Hip ROM: Bilateral hip internal rotation decreased.  Right hip external rotation causes right pain.      Special " Tests: Negative log roll, negative anterior/posterior drawer, negative Lachman's, no varus/valgus instability at 0 and 30 degrees, Francine's test causes pain in the right medial joint line    Sensation:  Altered sensation to light touch over the right medial distal thigh/knee/proximal lower leg      Radiology:  Independent visualization of images performed and reviewed with Ivan.    Recent Results (from the past 744 hour(s))   MR Knee Right w/o Contrast    Narrative    MR right knee without contrast 7/9/2020 3:25 PM    Techniques: Multiplanar multisequence imaging of the right knee was  obtained without administration of intra-articular or intravenous  contrast using routing protocol.    History: Knee pain, initial exam; Acute pain of right knee    Comparison: Radiograph 12/13/2006    Findings:    MENISCI:  Medial meniscus: Horizontal signal medial meniscus extending posterior  horn to the anterior horn involving the tibial articular surface.  Superimposed free edge fraying of the meniscal body with diminutive  meniscus remnant. Possible meniscal flap arising from the posterior  root ligament on sagittal series 6 image 18.  Lateral meniscus: Intact.    LIGAMENTS  Cruciate ligaments: Intact.  Medial supporting structures: Minimal thickening of the proximal MCL  attachment with mild overlying superficial edema. Mild peripheral  bowing of the superficial tibial collateral ligament related to  meniscal extrusion.  Lateral supporting structures: Lateral collateral ligament and biceps  femoris tendon are intact.     EXTENSOR MECHANISM  Intact quadriceps and patellar tendons. Extensive edema in Hoffa's fat  pad and the suprapatellar fat pad.    FLUID  Large joint effusion with mild synovitis. Tiny popliteal cyst with  edema tracking inferiorly along the medial gastrocnemius muscle belly,  likely sequelae of ruptured popliteal cyst.    OSSEOUS and ARTICULAR STRUCTURES  Bones: No fracture or abnormal marrow infiltration.   Mild edema-like  marrow signal in the femoral condyles and tibial plateaus.    Patellofemoral compartment: There is moderate generalized chondral  thinning with focal full-thickness cartilage loss of the medial  patellar facet superiorly with underlying subchondral edema    Medial compartment: Full thickness cartilage loss along the central  weightbearing surface of the medial femoral condyle and the medial  tibial plateau adjacent the meniscal body with associated subchondral  edema.    Lateral compartment: Full-thickness cartilage fissuring at the  posterior aspect of the lateral tibial plateau with minimal  subchondral edema.    ANCILLARY FINDINGS  Soft tissue edema along the knee anteriorly.      Impression    Impression:     1. Somewhat diminutive medial meniscus with horizontal increased  signal which may represent repeat tear versus postsurgical change..    2. Tricompartmental grade IV chondromalacia, most pronounced in the  patellofemoral and medial compartments.    3. Large joint effusion with mild synovitis.    I have personally reviewed the examination and initial interpretation  and I agree with the findings.    THERON BURRELL MD (Joe)       Assessment:  1. Acute pain of right knee    2. Chondromalacia of right knee    3. History of meniscectomy of right knee        Plan:  Discussed the assessment with the patient and developed a plan together:  -Ivan had an acute knee injury about 2 weeks ago.  History of 3 right knee surgeries related to menisci.  MRI shows evidence of prior meniscus surgery and chondromalacia.  Also thickening of the MCL.  Discussed physical therapy, bracing, and steroid injection/aspiration.  He states he wants it fixed.  Discussed that I am not sure that they would recommend surgical intervention but we could certainly get their opinion.  Referral placed.    -Provided home exercises. Please do as indicated.  -Brace as needed for comfort and support.  Dispensed today.    -Ice  or ice massage 15-20 minutes for pain relief or swelling as needed  -Patient's preferred over the counter medication as directed on packaging as needed for pain or soreness.  -Elevate the knee as much as possible above the heart to reduce swelling.        Follow Up: As needed if symptoms fail to improve or worsen. Please call with any questions or concerns.       Bianca Kelley MD, CAQ Sports Medicine  [facility]

## 2020-07-16 ENCOUNTER — OFFICE VISIT (OUTPATIENT)
Dept: ORTHOPEDICS | Facility: CLINIC | Age: 55
End: 2020-07-16
Attending: NURSE PRACTITIONER
Payer: COMMERCIAL

## 2020-07-16 VITALS
BODY MASS INDEX: 24.62 KG/M2 | DIASTOLIC BLOOD PRESSURE: 68 MMHG | WEIGHT: 172 LBS | SYSTOLIC BLOOD PRESSURE: 134 MMHG | HEIGHT: 70 IN

## 2020-07-16 DIAGNOSIS — Z98.890 HISTORY OF MENISCECTOMY OF RIGHT KNEE: ICD-10-CM

## 2020-07-16 DIAGNOSIS — M25.561 ACUTE PAIN OF RIGHT KNEE: Primary | ICD-10-CM

## 2020-07-16 DIAGNOSIS — M94.261 CHONDROMALACIA OF RIGHT KNEE: ICD-10-CM

## 2020-07-16 PROCEDURE — 99244 OFF/OP CNSLTJ NEW/EST MOD 40: CPT | Performed by: PHYSICAL MEDICINE & REHABILITATION

## 2020-07-16 ASSESSMENT — MIFFLIN-ST. JEOR: SCORE: 1616.44

## 2020-07-16 NOTE — LETTER
7/16/2020         RE: Ivan Bell  [address] [facility] [id number]        Dear Colleague,    Thank you for referring your patient, Ivan Bell, to the [facility] [place]. Please see a copy of my visit note below.    Sports Medicine Clinic Visit    PCP: [place]    CC: Patient presents with:  Right Knee - Pain      HPI:  Ivan Bell is a 54 year old male who is seen in consultation at the request of Jenifer Forde NP.   He notes right knee pain that began 10 days when he hopped out of a bobcat. He is unsure if he felt a pop, pain gradually worsened. He notes the pain started in the back of the knee and has migrated around to the inside of the knee.   He rates the pain at a 10/10 at its worst and a 7/10 currently.  Symptoms are relieved with compression slightly. Symptoms are worsened by walking, flexion, extension, kneeling, and getting up from the floor. He endorses swelling, popping, grinding, catching, instability and weakness.   He denies bruising and locking.  Other treatment has included ice and Tylenol. He notes difficulty with sleeping at night. He notes a burning sensation in the knee at night.  He has had 3 surgeries on the right knee and one on the left.  All related to the menisci.      Review of Systems:  Musculoskeletal: as above  Remainder of review of systems is negative including constitutional, eyes, ENT, CV, pulmonary, GI, , endocrine, skin, hematologic, and neurologic except as noted in HPI or medical history.    History reviewed. No pertinent past surgical/medical/family/social history other than as mentioned in HPI.    Patient Active Problem List   Diagnosis     Hypertriglyceridemia     Gouty arthropathy     Erectile dysfunction     CARDIOVASCULAR SCREENING; LDL GOAL LESS THAN 130     24 hour contact given to patient      Hypertension goal BP (blood pressure) < 140/90     Alcoholic cirrhosis of liver with ascites (H)     Hepatic  encephalopathy (H)     Calculus of gallbladder without cholecystitis     Nephrolithiasis     Decompensation of cirrhosis of liver (H)     Hypokalemia     CKD (chronic kidney disease) stage 2, GFR 60-89 ml/min     Seasonal allergic rhinitis, unspecified trigger     Trigger finger, acquired     Thrombocytopenia (H)     Past Medical History:   Diagnosis Date     Alcoholic cirrhosis (H)      Alcoholic hepatitis 03/2019     Gout      Hypertension      Hypertriglyceridemia      Left calcaneus fracture 1/9/2006 January 16, 2006: Fell 10 feet from ladder onto left foot on frozen ground on 1/9/06 at home.  Immediate pain and unable to walk- seen at [place] and diagnosed with calcaneus fracture     Portal vein thrombosis     left occlusion, partial main     Past Surgical History:   Procedure Laterality Date     ANKLE SURGERY Left      COLONOSCOPY N/A 3/31/2016    Procedure: COLONOSCOPY;  Surgeon: Rhys Uriostegui MD;  Location:  GI     ESOPHAGOSCOPY, GASTROSCOPY, DUODENOSCOPY (EGD), COMBINED N/A 3/31/2016    Procedure: COMBINED ESOPHAGOSCOPY, GASTROSCOPY, DUODENOSCOPY (EGD);  Surgeon: Rhys Uriostegui MD;  Location:  GI     ESOPHAGOSCOPY, GASTROSCOPY, DUODENOSCOPY (EGD), COMBINED N/A 3/9/2018    Procedure: COMBINED ESOPHAGOSCOPY, GASTROSCOPY, DUODENOSCOPY (EGD), BIOPSY SINGLE OR MULTIPLE;  EGD;  Surgeon: Gonzalo Wahl MD;  Location:  GI     ESOPHAGOSCOPY, GASTROSCOPY, DUODENOSCOPY (EGD), COMBINED N/A 6/7/2019    Procedure: ESOPHAGOGASTRODUODENOSCOPY (EGD);  Surgeon: Gonzalo Wahl MD;  Location:  GI     IR PARACENTESIS  10/29/2019     IR TRANSVEN INTRAHEPATIC PORTOSYST REV  10/29/2019     KNEE SURGERY Left      KNEE SURGERY Right      RELEASE TRIGGER FINGER Right 6/11/2020    Procedure: RELEASE, TRIGGER FINGER, right ring and long finger;  Surgeon: Pascual Valencia MD;  Location: [facility] OR     SIGMOIDOSCOPY FLEXIBLE N/A 10/31/2017    Procedure: SIGMOIDOSCOPY FLEXIBLE;;   Surgeon: Armaan Adams MD;  Location: [facility]     TIPS Procedure  2018     Family History   Problem Relation Age of Onset     Family History Negative Mother      Family History Negative Father      Hypertension Father      Cerebrovascular Disease Father 87     Breast Cancer Maternal Grandmother      Rheumatoid Arthritis Daughter      Depression Daughter      Cancer - colorectal No family hx of      Prostate Cancer No family hx of      Liver Disease No family hx of      Social History     Socioeconomic History     Marital status:      Spouse name: Not on file     Number of children: Not on file     Years of education: Not on file     Highest education level: Not on file   Occupational History     Not on file   Social Needs     Financial resource strain: Not on file     Food insecurity     Worry: Not on file     Inability: Not on file     Transportation needs     Medical: Not on file     Non-medical: Not on file   Tobacco Use     Smoking status: Former Smoker     Packs/day: 0.00     Types: Dip, chew, snus or snuff     Last attempt to quit: 1998     Years since quittin.8     Smokeless tobacco: Current User     Types: Chew     Tobacco comment: 1 tin per 10 days.   Substance and Sexual Activity     Alcohol use: No     Alcohol/week: 17.5 standard drinks     Types: 21 Cans of beer per week     Comment: last etoh 16, did have Odouls ~2017     Drug use: No     Sexual activity: Not on file   Lifestyle     Physical activity     Days per week: Not on file     Minutes per session: Not on file     Stress: Not on file   Relationships     Social connections     Talks on phone: Not on file     Gets together: Not on file     Attends Voodoo service: Not on file     Active member of club or organization: Not on file     Attends meetings of clubs or organizations: Not on file     Relationship status: Not on file     Intimate partner violence     Fear of current or ex partner: Not on file     Emotionally  "abused: Not on file     Physically abused: Not on file     Forced sexual activity: Not on file   Other Topics Concern     Parent/sibling w/ CABG, MI or angioplasty before 65F 55M? No   Social History Narrative     Not on file       He is self employed, [employer].     Current Outpatient Medications   Medication     acetaminophen (TYLENOL) 500 MG tablet     Ascorbic Acid (VITAMIN C PO)     fexofenadine (ALLEGRA) 60 MG tablet     furosemide (LASIX) 20 MG tablet     lactulose (CONSTULOSE) 10 GM/15ML solution     Menaquinone-7 (VITAMIN K2) 100 MCG CAPS     MULTIPLE VITAMINS PO     naproxen (NAPROSYN) 500 MG tablet     order for DME     potassium chloride ER (K-DUR/KLOR-CON M) 20 MEQ CR tablet     rifaximin (XIFAXAN) 550 MG TABS tablet     senna-docusate (SENOKOT-S/PERICOLACE) 8.6-50 MG tablet     sertraline (ZOLOFT) 50 MG tablet     sodium bicarbonate 650 MG tablet     spironolactone (ALDACTONE) 50 MG tablet     Thiamine HCl (B-1) 100 MG TABS     vitamin D3 (CHOLECALCIFEROL) 2000 units (50 mcg) tablet     No current facility-administered medications for this visit.      Allergies   Allergen Reactions     Prednisone Visual Disturbance     Trazodone Visual Disturbance     Benadryl [Diphenhydramine] Other (See Comments)     Delirium (visual and auditory hallucinations)     Oxycodone Other (See Comments)     Delirium and constipation         Objective:  /68   Ht 1.778 m (5' 10\")   Wt 78 kg (172 lb)   BMI 24.68 kg/m      General: Alert and in no distress    Head: Normocephalic, atraumatic  Eyes: no scleral icterus or conjunctival erythema   Skin: no erythema, petechiae, or jaundice  CV: regular rhythm by palpation, 2+ distal pulses  Resp: normal respiratory effort without conversational dyspnea   Psych: normal mood and affect    Gait: Antalgic  Neuro: Motor strength and sensation as noted below    Musculoskeletal:    Bilateral Knee exam    Inspection:  No erythema, ecchymosis, or warmth.  Right knee " effusion.    Palpation: Tender to palpation over the right medial knee and popliteal fossa.    Knee ROM: Right knee flexion and extension decreased and painful.    Hip ROM: Bilateral hip internal rotation decreased.  Right hip external rotation causes right pain.      Special Tests: Negative log roll, negative anterior/posterior drawer, negative Lachman's, no varus/valgus instability at 0 and 30 degrees, Francine's test causes pain in the right medial joint line    Sensation:  Altered sensation to light touch over the right medial distal thigh/knee/proximal lower leg      Radiology:  Independent visualization of images performed and reviewed with Ivan.    Recent Results (from the past 744 hour(s))   MR Knee Right w/o Contrast    Narrative    MR right knee without contrast 7/9/2020 3:25 PM    Techniques: Multiplanar multisequence imaging of the right knee was  obtained without administration of intra-articular or intravenous  contrast using routing protocol.    History: Knee pain, initial exam; Acute pain of right knee    Comparison: Radiograph 12/13/2006    Findings:    MENISCI:  Medial meniscus: Horizontal signal medial meniscus extending posterior  horn to the anterior horn involving the tibial articular surface.  Superimposed free edge fraying of the meniscal body with diminutive  meniscus remnant. Possible meniscal flap arising from the posterior  root ligament on sagittal series 6 image 18.  Lateral meniscus: Intact.    LIGAMENTS  Cruciate ligaments: Intact.  Medial supporting structures: Minimal thickening of the proximal MCL  attachment with mild overlying superficial edema. Mild peripheral  bowing of the superficial tibial collateral ligament related to  meniscal extrusion.  Lateral supporting structures: Lateral collateral ligament and biceps  femoris tendon are intact.     EXTENSOR MECHANISM  Intact quadriceps and patellar tendons. Extensive edema in Hoffa's fat  pad and the suprapatellar fat  pad.    FLUID  Large joint effusion with mild synovitis. Tiny popliteal cyst with  edema tracking inferiorly along the medial gastrocnemius muscle belly,  likely sequelae of ruptured popliteal cyst.    OSSEOUS and ARTICULAR STRUCTURES  Bones: No fracture or abnormal marrow infiltration.  Mild edema-like  marrow signal in the femoral condyles and tibial plateaus.    Patellofemoral compartment: There is moderate generalized chondral  thinning with focal full-thickness cartilage loss of the medial  patellar facet superiorly with underlying subchondral edema    Medial compartment: Full thickness cartilage loss along the central  weightbearing surface of the medial femoral condyle and the medial  tibial plateau adjacent the meniscal body with associated subchondral  edema.    Lateral compartment: Full-thickness cartilage fissuring at the  posterior aspect of the lateral tibial plateau with minimal  subchondral edema.    ANCILLARY FINDINGS  Soft tissue edema along the knee anteriorly.      Impression    Impression:     1. Somewhat diminutive medial meniscus with horizontal increased  signal which may represent repeat tear versus postsurgical change..    2. Tricompartmental grade IV chondromalacia, most pronounced in the  patellofemoral and medial compartments.    3. Large joint effusion with mild synovitis.    I have personally reviewed the examination and initial interpretation  and I agree with the findings.    THERON BURRELL MD (Joe)       Assessment:  1. Acute pain of right knee    2. Chondromalacia of right knee    3. History of meniscectomy of right knee        Plan:  Discussed the assessment with the patient and developed a plan together:  -Ivan had an acute knee injury about 2 weeks ago.  History of 3 right knee surgeries related to menisci.  MRI shows evidence of prior meniscus surgery and chondromalacia.  Also thickening of the MCL.  Discussed physical therapy, bracing, and steroid injection/aspiration.   He states he wants it fixed.  Discussed that I am not sure that they would recommend surgical intervention but we could certainly get their opinion.  Referral placed.    -Provided home exercises. Please do as indicated.  -Brace as needed for comfort and support.  Dispensed today.    -Ice or ice massage 15-20 minutes for pain relief or swelling as needed  -Patient's preferred over the counter medication as directed on packaging as needed for pain or soreness.  -Elevate the knee as much as possible above the heart to reduce swelling.        Follow Up: As needed if symptoms fail to improve or worsen. Please call with any questions or concerns.       Bianca Kelley MD, [facility]  [facility]    Again, thank you for allowing me to participate in the care of your patient.        Sincerely,        Edna Kelley MD

## 2020-07-20 ENCOUNTER — ANCILLARY PROCEDURE (OUTPATIENT)
Dept: GENERAL RADIOLOGY | Facility: CLINIC | Age: 55
End: 2020-07-20
Attending: ORTHOPAEDIC SURGERY
Payer: COMMERCIAL

## 2020-07-20 ENCOUNTER — OFFICE VISIT (OUTPATIENT)
Dept: ORTHOPEDICS | Facility: CLINIC | Age: 55
End: 2020-07-20
Payer: COMMERCIAL

## 2020-07-20 VITALS — SYSTOLIC BLOOD PRESSURE: 162 MMHG | DIASTOLIC BLOOD PRESSURE: 77 MMHG | HEART RATE: 80 BPM

## 2020-07-20 DIAGNOSIS — G89.29 CHRONIC PAIN OF RIGHT KNEE: ICD-10-CM

## 2020-07-20 DIAGNOSIS — M25.561 ACUTE PAIN OF RIGHT KNEE: ICD-10-CM

## 2020-07-20 DIAGNOSIS — S83.411A SPRAIN OF MEDIAL COLLATERAL LIGAMENT OF RIGHT KNEE, INITIAL ENCOUNTER: ICD-10-CM

## 2020-07-20 DIAGNOSIS — G89.29 CHRONIC PAIN OF RIGHT KNEE: Primary | ICD-10-CM

## 2020-07-20 DIAGNOSIS — M25.561 CHRONIC PAIN OF RIGHT KNEE: Primary | ICD-10-CM

## 2020-07-20 DIAGNOSIS — M25.561 CHRONIC PAIN OF RIGHT KNEE: ICD-10-CM

## 2020-07-20 DIAGNOSIS — M17.11 PRIMARY OSTEOARTHRITIS OF RIGHT KNEE: ICD-10-CM

## 2020-07-20 PROCEDURE — 99243 OFF/OP CNSLTJ NEW/EST LOW 30: CPT | Mod: 24 | Performed by: ORTHOPAEDIC SURGERY

## 2020-07-20 PROCEDURE — 20610 DRAIN/INJ JOINT/BURSA W/O US: CPT | Mod: RT | Performed by: ORTHOPAEDIC SURGERY

## 2020-07-20 PROCEDURE — 73562 X-RAY EXAM OF KNEE 3: CPT | Mod: RT

## 2020-07-20 RX ORDER — BUPIVACAINE HYDROCHLORIDE 2.5 MG/ML
4 INJECTION, SOLUTION INFILTRATION; PERINEURAL
Status: DISCONTINUED | OUTPATIENT
Start: 2020-07-20 | End: 2020-11-02

## 2020-07-20 RX ORDER — METHYLPREDNISOLONE ACETATE 80 MG/ML
80 INJECTION, SUSPENSION INTRA-ARTICULAR; INTRALESIONAL; INTRAMUSCULAR; SOFT TISSUE
Status: DISCONTINUED | OUTPATIENT
Start: 2020-07-20 | End: 2020-11-02

## 2020-07-20 RX ADMIN — BUPIVACAINE HYDROCHLORIDE 4 ML: 2.5 INJECTION, SOLUTION INFILTRATION; PERINEURAL at 16:10

## 2020-07-20 RX ADMIN — METHYLPREDNISOLONE ACETATE 80 MG: 80 INJECTION, SUSPENSION INTRA-ARTICULAR; INTRALESIONAL; INTRAMUSCULAR; SOFT TISSUE at 16:10

## 2020-07-20 ASSESSMENT — PAIN SCALES - GENERAL: PAINLEVEL: SEVERE PAIN (7)

## 2020-07-20 NOTE — NURSING NOTE
Weight management plan: Patient was referred to their PCP to discuss a diet and exercise plan.     Ivan to follow up with Primary Care provider regarding elevated blood pressure.

## 2020-07-20 NOTE — LETTER
"    7/20/2020         RE: Ivan Bell  [address] [facility] [address]        Dear Colleague,    Thank you for referring your patient, Ivan Bell, to the [facility] [place]. Please see a copy of my visit note below.    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   nothing to make the pain better  Had MRI done thru Dr. Kelley.  Recent injury from getting  out of [employer]   .    Ivan Bell is seen today in the [facility] for evaluation of right knee pain at the request of Dr. Bianca Kelley MD      HISTORY OF PRESENT ILLNESS    Ivan Bell is a 54 year old male seen for evaluation of ongoing right knee pain with a known injury.   He reports having right knee pain for years, having had 3 previous surgeries, but worse after an injury ~7/6/2020 when he jumped out of his [employer], twisting his right knee. Locates pain throughout the knee with movement or pressure. Pain started behind the knee but has moved around to the front. Treatment has been ice, tylenol. Pain is burning. He continues to work in concrete.     Right knee surgery x3, left knee surgery x1.    Present symptoms: pain diffuse, pain sharp, dull/achy, burning , moderate pain, severe pain, moderate swelling.    Pain severity: 7/10  Frequency of symptoms: are constant  Exacerbating Factors:  Walking working.  Relieving Factors: rest, ice.  Night Pain: Yes  Pain while at rest: Yes   Numbness or tingling: No   Patient has tried:     NSAIDS: No      Physical Therapy: No      Activity modification: No      Bracing: Yes      Injections: No     Ice: Yes      Assistive device:  No      Other PMH:  has a past medical history of Alcoholic cirrhosis (H), Alcoholic hepatitis (03/2019), Gout, Hypertension, Hypertriglyceridemia, Left calcaneus fracture (1/9/2006), and Portal vein thrombosis.  Patient " Active Problem List   Diagnosis     Hypertriglyceridemia     Gouty arthropathy     Erectile dysfunction     CARDIOVASCULAR SCREENING; LDL GOAL LESS THAN 130     24 hour contact given to patient      Hypertension goal BP (blood pressure) < 140/90     Alcoholic cirrhosis of liver with ascites (H)     Hepatic encephalopathy (H)     Calculus of gallbladder without cholecystitis     Nephrolithiasis     Decompensation of cirrhosis of liver (H)     Hypokalemia     CKD (chronic kidney disease) stage 2, GFR 60-89 ml/min     Seasonal allergic rhinitis, unspecified trigger     Trigger finger, acquired     Thrombocytopenia (H)       Surgical Hx:  has a past surgical history that includes Colonoscopy (N/A, 3/31/2016); Ankle surgery (Left); Esophagoscopy, gastroscopy, duodenoscopy (EGD), combined (N/A, 3/31/2016); Sigmoidoscopy flexible (N/A, 10/31/2017); Esophagoscopy, gastroscopy, duodenoscopy (EGD), combined (N/A, 3/9/2018); TIPS Procedure (06/06/2018); Esophagoscopy, gastroscopy, duodenoscopy (EGD), combined (N/A, 6/7/2019); IR Paracentesis (10/29/2019); IR Transven Intrahepatic Portosyst Rev (10/29/2019); Release trigger finger (Right, 6/11/2020); knee surgery (Left); and knee surgery (Right).    Medications:   Current Outpatient Medications:      acetaminophen (TYLENOL) 500 MG tablet, Take 1,000 mg by mouth every 6 hours as needed for mild pain, Disp: , Rfl:      Ascorbic Acid (VITAMIN C PO), Take by mouth daily, Disp: , Rfl:      fexofenadine (ALLEGRA) 60 MG tablet, Take 1 tablet (60 mg) by mouth daily, Disp: 30 tablet, Rfl: 1     furosemide (LASIX) 20 MG tablet, Take 1 tablet (20 mg) by mouth daily, Disp: 90 tablet, Rfl: 3     lactulose (CONSTULOSE) 10 GM/15ML solution, Take 30 mLs (20 g) by mouth daily, Disp: , Rfl:      Menaquinone-7 (VITAMIN K2) 100 MCG CAPS, Take 200 mcg by mouth daily , Disp: , Rfl:      MULTIPLE VITAMINS PO, Take 1 tablet by mouth daily, Disp: , Rfl:      naproxen (NAPROSYN) 500 MG tablet, Take 1  pill twice per day with food for 10 days then every 12 hours as needed, Disp: 40 tablet, Rfl: 0     order for DME, Equipment being ordered: patellar stabilization knee brace with horseshoe, Large, Disp: 1 Device, Rfl: 0     potassium chloride ER (K-DUR/KLOR-CON M) 20 MEQ CR tablet, Take 2 tablets (40 mEq) by mouth daily, Disp: 120 tablet, Rfl: 3     rifaximin (XIFAXAN) 550 MG TABS tablet, Take 1 tablet (550 mg) by mouth 2 times daily, Disp: 180 tablet, Rfl: 0     senna-docusate (SENOKOT-S/PERICOLACE) 8.6-50 MG tablet, Take 1-2 tablets by mouth 2 times daily, Disp: 30 tablet, Rfl: 0     sertraline (ZOLOFT) 50 MG tablet, TAKE 1/2 TABLET BY MOUTH ONCE DAILY, Disp: 30 tablet, Rfl: 3     sodium bicarbonate 650 MG tablet, Take 1 tablet (650 mg) by mouth 2 times daily, Disp: 180 tablet, Rfl: 3     spironolactone (ALDACTONE) 50 MG tablet, Take 1 tablet (50 mg) by mouth daily, Disp: 90 tablet, Rfl: 3     Thiamine HCl (B-1) 100 MG TABS, Take 1 tablet by mouth daily, Disp: 90 tablet, Rfl: 3     vitamin D3 (CHOLECALCIFEROL) 2000 units (50 mcg) tablet, Take 1 tablet by mouth daily, Disp: , Rfl:     Allergies:   Allergies   Allergen Reactions     Prednisone Visual Disturbance     Trazodone Visual Disturbance     Benadryl [Diphenhydramine] Other (See Comments)     Delirium (visual and auditory hallucinations)     Oxycodone Other (See Comments)     Delirium and constipation       Social Hx: .   reports that he quit smoking about 21 years ago. His smoking use included dip, chew, snus or snuff. He smoked 0.00 packs per day. His smokeless tobacco use includes chew. He reports that he does not drink alcohol or use drugs.    Family Hx: family history includes Breast Cancer in his maternal grandmother; Cerebrovascular Disease (age of onset: 87) in his father; Depression in his daughter; Family History Negative in his father and mother; Hypertension in his father; Rheumatoid Arthritis in his daughter.    REVIEW OF SYSTEMS:  10 point ROS neg other than the symptoms noted above in the HPI and PMH. Notables include  CONSTITUTIONAL:NEGATIVE for fever, chills, change in weight  INTEGUMENTARY/SKIN: NEGATIVE for worrisome rashes, moles or lesions  MUSCULOSKELETAL:See HPI above  NEURO: NEGATIVE for weakness, dizziness or paresthesias    PHYSICAL EXAM:  BP (!) 162/77   Pulse 80    GENERAL APPEARANCE: healthy, alert, no distress  SKIN: diffuse blotchy, purplish skin discoloration of the upper extremity, otherwise no suspicious lesions or rashes  NEURO: Normal strength and tone, mentation intact and speech normal  PSYCH:  mentation appears normal and affect normal, not anxious  RESPIRATORY: No increased work of breathing.  HANDS: no clubbing, nail pitting  LYMPH: no palpable popliteal lymphadenopathy.    BILATERAL LOWER EXTREMITIES:  Gait: antalgic favoring right.   Alignment:  vaurs  No gross deformities or masses.  Bilateral Quad atrophy, strength weak on the right.  Intact sensation deep peroneal nerve, superficial peroneal nerve, med/lat tibial nerve, sural nerve, saphenous nerve  Intact EHL, EDL, TA, FHL, GS, quadriceps hamstrings and hip flexors  Toes warm and well perfused, brisk capillary refill. Palpable 2+ dp pulses.  Bilateral calf soft and nttp or squeeze.  DTRs: achilles 2+, patella 2+.  Edema: trace    LEFT KNEE EXAM:    Skin: intact, no ecchymosis or erythema  ROM: full extension to 130 flexion  Tight hamstrings on straight leg raise.  Effusion: none  Tender: medial joint line, pes  NTTP lat joint line, anterior or posterior knee  McMurrays: negative    MCL: stable, and non-painful at both 0 and 30 degrees knee flexion  Varus stress: stable, and non-painful at both 0 and 30 degrees knee flexion  Lachmans: neg, firm endpoint  Posterior Drawer stable  Patellofemoral joint:                Apprehension: negative              Crepitations: mild   Grind: positive.    RIGHT KNEE EXAM:    Skin: intact, no ecchymosis or erythema  ROM: 3  "extension to 90 flexion, limited by diffuse discomfort.  Tight hamstrings on straight leg raise.  Effusion: moderate   Tender: TTP med/lat joint line, anterior and posterior knee, medial collateral ligament, pes  McMurrays: negative    MCL: stable, and moderately-painful at both 0 and 30 degrees knee flexion  Varus stress: stable, and non-painful at both 0 and 30 degrees knee flexion  Lachmans: neg, firm endpoint  Posterior Drawer stable  Patellofemoral joint:                Apprehension: negative              Crepitations: mild   Grind: positive.    X-RAY:  3 views right knee from 7/20/2020 were reviewed in clinic today. On my review, no obvious fractures or dislocations. Moderate medial compartment narrowing.    MRI:  MRI right knee from 7/9/2020 was reviewed in clinic today.    1. Somewhat diminutive medial meniscus with horizontal increased  signal which may represent repeat tear versus postsurgical change..  2. Tricompartmental grade IV chondromalacia, most pronounced in the  patellofemoral and medial compartments.  3. Large joint effusion with mild synovitis.        ASSESSMENT/PLAN: Ivan Bell is a 54 year old male with acute on chronic right knee pain, primary osteoarthritis, effusion, medial collateral ligament sprain..     * reviewed imaging studies with patient, showing arthritic changes, or wearing of the cartilage in the knee. This can be caused by normal \"wear and tear\" over the years or following prior injury to the knee.    Non-surgical treatment for knee arthritis includes:    * rest, sitting  * Activity modification - avoid impact activities or activities that aggravate symptoms.  * NSAIDS (non-steroidal anti-inflammatory medications; e.g. Aleve, advil, motrin, ibuprofen) - regular use for inflammation ( twice daily or three times daily), with food, as long as no contra-indications Please discuss with primary care doctor if needed  * ice, 15-20 minutes at a time several times a day or as " "needed.  * Strengthening of quadriceps muscles  * Physical Therapy for strengthening, stretching and range of motion exercises of legs  * Tylenol as needed for pain, consider Tylenol arthritis or similar  * Weight loss: Weight loss:  There is no height or weight on file to calculate BMI.. weight loss benefits, not only for the current pain symptoms, but also overall health. Recommend a good diet plan that works for the patient, with the assistance of a dietician or primary care doctor as needed. Also, a good, low-impact exercise program for at least 20 minutes per day, 3 times per week, such as exercise bike, elliptical , or pool.  * Exercise: low impact such as stationary bike, elliptical, pool.  * Injections: cortisone versus viscosupplementation (hyaluronic acid, \"rooster comb\", \"gel shots\"); risks and perceived benefits discussed today. Patient elects  to proceed.  * Bracing: bracing the knee may offer some relief of symptoms when worn and provide some stability, especially with medial collateral ligament sprain.  * over the counter supplements such as glucosamine and chondroitin sulfate may help with joint pain.  * topical ointments may help as well    * return to clinic as needed.          Pascual Valencia M.D., M.S.  Dept. of Orthopaedic Surgery  [facility]    Large Joint Injection/Arthocentesis: R knee joint    Date/Time: 7/20/2020 4:10 PM  Performed by: Pascual Valencia MD  Authorized by: Pascual Valencia MD     Indications:  Pain  Needle Size:  22 G  Guidance: landmark guided    Approach:  Superolateral  Location:  Knee      Medications:  80 mg methylPREDNISolone 80 MG/ML; 4 mL bupivacaine 0.25 %  Aspirate amount (mL):  20  Aspirate:  Serous, yellow and clear  Outcome:  Tolerated well, no immediate complications  Procedure discussed: discussed risks, benefits, and alternatives    Consent Given by:  Patient  Timeout: timeout called immediately prior to procedure    Prep: patient was " prepped and draped in usual sterile fashion            Again, thank you for allowing me to participate in the care of your patient.        Sincerely,        Pascual Valencia MD

## 2020-07-28 ENCOUNTER — PATIENT OUTREACH (OUTPATIENT)
Dept: GASTROENTEROLOGY | Facility: CLINIC | Age: 55
End: 2020-07-28

## 2020-08-06 DIAGNOSIS — K70.31 ALCOHOLIC CIRRHOSIS OF LIVER WITH ASCITES (H): ICD-10-CM

## 2020-08-06 LAB
ALBUMIN SERPL-MCNC: 2.3 G/DL (ref 3.4–5)
ALP SERPL-CCNC: 120 U/L (ref 40–150)
ALT SERPL W P-5'-P-CCNC: 21 U/L (ref 0–70)
ANION GAP SERPL CALCULATED.3IONS-SCNC: 6 MMOL/L (ref 3–14)
AST SERPL W P-5'-P-CCNC: 38 U/L (ref 0–45)
BILIRUB DIRECT SERPL-MCNC: 1.3 MG/DL (ref 0–0.2)
BILIRUB SERPL-MCNC: 3.1 MG/DL (ref 0.2–1.3)
BUN SERPL-MCNC: 13 MG/DL (ref 7–30)
CALCIUM SERPL-MCNC: 8.4 MG/DL (ref 8.5–10.1)
CHLORIDE SERPL-SCNC: 115 MMOL/L (ref 94–109)
CO2 SERPL-SCNC: 24 MMOL/L (ref 20–32)
CREAT SERPL-MCNC: 1.25 MG/DL (ref 0.66–1.25)
ERYTHROCYTE [DISTWIDTH] IN BLOOD BY AUTOMATED COUNT: 16.8 % (ref 10–15)
GFR SERPL CREATININE-BSD FRML MDRD: 64 ML/MIN/{1.73_M2}
GLUCOSE SERPL-MCNC: 110 MG/DL (ref 70–99)
HCT VFR BLD AUTO: 33.8 % (ref 40–53)
HGB BLD-MCNC: 11.1 G/DL (ref 13.3–17.7)
INR PPP: 1.75 (ref 0.86–1.14)
MCH RBC QN AUTO: 32.4 PG (ref 26.5–33)
MCHC RBC AUTO-ENTMCNC: 32.8 G/DL (ref 31.5–36.5)
MCV RBC AUTO: 99 FL (ref 78–100)
PLATELET # BLD AUTO: 59 10E9/L (ref 150–450)
POTASSIUM SERPL-SCNC: 3.9 MMOL/L (ref 3.4–5.3)
PROT SERPL-MCNC: 5.2 G/DL (ref 6.8–8.8)
RBC # BLD AUTO: 3.43 10E12/L (ref 4.4–5.9)
SODIUM SERPL-SCNC: 145 MMOL/L (ref 133–144)
WBC # BLD AUTO: 3.3 10E9/L (ref 4–11)

## 2020-08-06 PROCEDURE — 85610 PROTHROMBIN TIME: CPT | Performed by: INTERNAL MEDICINE

## 2020-08-06 PROCEDURE — 80048 BASIC METABOLIC PNL TOTAL CA: CPT | Performed by: INTERNAL MEDICINE

## 2020-08-06 PROCEDURE — 80076 HEPATIC FUNCTION PANEL: CPT | Performed by: INTERNAL MEDICINE

## 2020-08-06 PROCEDURE — 85027 COMPLETE CBC AUTOMATED: CPT | Performed by: INTERNAL MEDICINE

## 2020-08-06 PROCEDURE — 36415 COLL VENOUS BLD VENIPUNCTURE: CPT | Performed by: INTERNAL MEDICINE

## 2020-08-07 DIAGNOSIS — N52.9 IMPOTENCE OF ORGANIC ORIGIN: ICD-10-CM

## 2020-08-10 ENCOUNTER — TELEPHONE (OUTPATIENT)
Dept: NEPHROLOGY | Facility: CLINIC | Age: 55
End: 2020-08-10

## 2020-08-10 DIAGNOSIS — N25.89 RENAL TUBULAR ACIDOSIS: ICD-10-CM

## 2020-08-10 RX ORDER — SODIUM BICARBONATE 650 MG/1
650 TABLET ORAL 2 TIMES DAILY
Qty: 180 TABLET | Refills: 3 | Status: SHIPPED | OUTPATIENT
Start: 2020-08-10 | End: 2021-08-10

## 2020-08-10 RX ORDER — SILDENAFIL CITRATE 20 MG/1
TABLET ORAL
Qty: 90 TABLET | Refills: 3 | OUTPATIENT
Start: 2020-08-10

## 2020-08-10 NOTE — TELEPHONE ENCOUNTER
Refill request from [facility] for sodium bicarbonate 650 mg BID. Per protocol, last CO2 24, no change in dose, refill submitted.

## 2020-08-20 ENCOUNTER — HOSPITAL ENCOUNTER (OUTPATIENT)
Dept: ULTRASOUND IMAGING | Facility: CLINIC | Age: 55
Discharge: HOME | End: 2020-08-20
Attending: INTERNAL MEDICINE | Admitting: INTERNAL MEDICINE
Payer: COMMERCIAL

## 2020-08-20 DIAGNOSIS — K70.31 ALCOHOLIC CIRRHOSIS OF LIVER WITH ASCITES (H): ICD-10-CM

## 2020-08-20 PROCEDURE — 76700 US EXAM ABDOM COMPLETE: CPT

## 2020-08-21 DIAGNOSIS — E87.6 HYPOKALEMIA: ICD-10-CM

## 2020-08-24 DIAGNOSIS — K70.31 ALCOHOLIC CIRRHOSIS OF LIVER WITH ASCITES (H): ICD-10-CM

## 2020-08-24 RX ORDER — POTASSIUM CHLORIDE 1500 MG/1
40 TABLET, EXTENDED RELEASE ORAL DAILY
Qty: 120 TABLET | Refills: 3 | Status: SHIPPED | OUTPATIENT
Start: 2020-08-24 | End: 2021-04-30

## 2020-08-24 RX ORDER — FUROSEMIDE 20 MG
40 TABLET ORAL DAILY
Qty: 180 TABLET | Refills: 3 | Status: SHIPPED | OUTPATIENT
Start: 2020-08-24 | End: 2021-09-29

## 2020-08-24 NOTE — PROGRESS NOTES
Called the patient and reviewed plan per Dr. Bales to increase lasix to 40 mg and repeat lab in 2 weeks. Patient will begin by taking lasix 20 mg daily and alternating with 40 mg every other day. He will get labs the week of Labor day, and will call with any changes or concerns.

## 2020-08-27 ENCOUNTER — TRANSFERRED RECORDS (OUTPATIENT)
Dept: HEALTH INFORMATION MANAGEMENT | Facility: CLINIC | Age: 55
End: 2020-08-27

## 2020-08-31 ENCOUNTER — CARE COORDINATION (OUTPATIENT)
Dept: GASTROENTEROLOGY | Facility: CLINIC | Age: 55
End: 2020-08-31

## 2020-08-31 NOTE — PROGRESS NOTES
Returned call to patient and left detailed message reviewing plan of care to increase furosemide and repeat labs next week. Call back number provided.

## 2020-09-15 DIAGNOSIS — K70.31 ALCOHOLIC CIRRHOSIS OF LIVER WITH ASCITES (H): ICD-10-CM

## 2020-09-15 LAB
ANION GAP SERPL CALCULATED.3IONS-SCNC: 6 MMOL/L (ref 3–14)
BUN SERPL-MCNC: 12 MG/DL (ref 7–30)
CALCIUM SERPL-MCNC: 8.5 MG/DL (ref 8.5–10.1)
CHLORIDE SERPL-SCNC: 114 MMOL/L (ref 94–109)
CO2 SERPL-SCNC: 22 MMOL/L (ref 20–32)
CREAT SERPL-MCNC: 1.12 MG/DL (ref 0.66–1.25)
GFR SERPL CREATININE-BSD FRML MDRD: 73 ML/MIN/{1.73_M2}
GLUCOSE SERPL-MCNC: 128 MG/DL (ref 70–99)
POTASSIUM SERPL-SCNC: 4.3 MMOL/L (ref 3.4–5.3)
SODIUM SERPL-SCNC: 142 MMOL/L (ref 133–144)

## 2020-09-15 PROCEDURE — 36415 COLL VENOUS BLD VENIPUNCTURE: CPT | Performed by: INTERNAL MEDICINE

## 2020-09-15 PROCEDURE — 80048 BASIC METABOLIC PNL TOTAL CA: CPT | Performed by: INTERNAL MEDICINE

## 2020-10-06 ENCOUNTER — PATIENT OUTREACH (OUTPATIENT)
Dept: GASTROENTEROLOGY | Facility: CLINIC | Age: 55
End: 2020-10-06

## 2020-10-06 NOTE — PROGRESS NOTES
Attempted to reach patient for check in, no answer, message left requesting call back, number provided.  Also reminded that he needs to schedule a follow up with Dr. Bales in November, scheduling number provided.

## 2020-10-09 NOTE — PROGRESS NOTES
Patient returned call. He states he has been doing okay, feeling tired and poor sleep at times. He will be having a knee replacement at the end of October, and his surgeon is aware of his liver disease. Discussed need for additional attention to signs of encephalopathy after anesthesia and while taking pain medications, and strategies to use including additional lactulose as needed. He is not having issues with significant fluid retention, and continues to work. He agrees to follow up and will be scheduled at 0930 on 11/24.

## 2020-10-12 ENCOUNTER — OFFICE VISIT (OUTPATIENT)
Dept: FAMILY MEDICINE | Facility: CLINIC | Age: 55
End: 2020-10-12
Payer: COMMERCIAL

## 2020-10-12 VITALS
HEIGHT: 70 IN | WEIGHT: 176 LBS | DIASTOLIC BLOOD PRESSURE: 66 MMHG | BODY MASS INDEX: 25.2 KG/M2 | SYSTOLIC BLOOD PRESSURE: 123 MMHG | TEMPERATURE: 98 F | RESPIRATION RATE: 15 BRPM | HEART RATE: 71 BPM

## 2020-10-12 DIAGNOSIS — Z13.220 SCREENING FOR HYPERLIPIDEMIA: ICD-10-CM

## 2020-10-12 DIAGNOSIS — Z23 NEED FOR VACCINATION: ICD-10-CM

## 2020-10-12 DIAGNOSIS — M65.30 TRIGGER FINGER, ACQUIRED: ICD-10-CM

## 2020-10-12 DIAGNOSIS — K74.60 DECOMPENSATION OF CIRRHOSIS OF LIVER (H): ICD-10-CM

## 2020-10-12 DIAGNOSIS — K76.82 HEPATIC ENCEPHALOPATHY (H): ICD-10-CM

## 2020-10-12 DIAGNOSIS — N18.2 CKD (CHRONIC KIDNEY DISEASE) STAGE 2, GFR 60-89 ML/MIN: ICD-10-CM

## 2020-10-12 DIAGNOSIS — Z01.818 PREOP GENERAL PHYSICAL EXAM: Primary | ICD-10-CM

## 2020-10-12 DIAGNOSIS — K72.90 DECOMPENSATION OF CIRRHOSIS OF LIVER (H): ICD-10-CM

## 2020-10-12 DIAGNOSIS — I10 HYPERTENSION GOAL BP (BLOOD PRESSURE) < 140/90: ICD-10-CM

## 2020-10-12 LAB
ALBUMIN SERPL-MCNC: 2.4 G/DL (ref 3.4–5)
ALP SERPL-CCNC: 148 U/L (ref 40–150)
ALT SERPL W P-5'-P-CCNC: 21 U/L (ref 0–70)
ANION GAP SERPL CALCULATED.3IONS-SCNC: 3 MMOL/L (ref 3–14)
AST SERPL W P-5'-P-CCNC: 45 U/L (ref 0–45)
BILIRUB DIRECT SERPL-MCNC: 1.1 MG/DL (ref 0–0.2)
BILIRUB SERPL-MCNC: 2.4 MG/DL (ref 0.2–1.3)
BUN SERPL-MCNC: 13 MG/DL (ref 7–30)
CALCIUM SERPL-MCNC: 8.7 MG/DL (ref 8.5–10.1)
CHLORIDE SERPL-SCNC: 116 MMOL/L (ref 94–109)
CO2 SERPL-SCNC: 26 MMOL/L (ref 20–32)
CREAT SERPL-MCNC: 1.2 MG/DL (ref 0.66–1.25)
ERYTHROCYTE [DISTWIDTH] IN BLOOD BY AUTOMATED COUNT: 17.1 % (ref 10–15)
GFR SERPL CREATININE-BSD FRML MDRD: 67 ML/MIN/{1.73_M2}
GLUCOSE SERPL-MCNC: 93 MG/DL (ref 70–99)
HCT VFR BLD AUTO: 35.1 % (ref 40–53)
HGB BLD-MCNC: 11.5 G/DL (ref 13.3–17.7)
INR PPP: 1.66 (ref 0.86–1.14)
LDLC SERPL DIRECT ASSAY-MCNC: 54 MG/DL
MCH RBC QN AUTO: 32.1 PG (ref 26.5–33)
MCHC RBC AUTO-ENTMCNC: 32.8 G/DL (ref 31.5–36.5)
MCV RBC AUTO: 98 FL (ref 78–100)
PLATELET # BLD AUTO: 58 10E9/L (ref 150–450)
POTASSIUM SERPL-SCNC: 4.7 MMOL/L (ref 3.4–5.3)
PROT SERPL-MCNC: 5.6 G/DL (ref 6.8–8.8)
RBC # BLD AUTO: 3.58 10E12/L (ref 4.4–5.9)
SODIUM SERPL-SCNC: 145 MMOL/L (ref 133–144)
WBC # BLD AUTO: 3.3 10E9/L (ref 4–11)

## 2020-10-12 PROCEDURE — 85027 COMPLETE CBC AUTOMATED: CPT | Performed by: PHYSICIAN ASSISTANT

## 2020-10-12 PROCEDURE — 90682 RIV4 VACC RECOMBINANT DNA IM: CPT | Performed by: PHYSICIAN ASSISTANT

## 2020-10-12 PROCEDURE — 83721 ASSAY OF BLOOD LIPOPROTEIN: CPT | Performed by: PHYSICIAN ASSISTANT

## 2020-10-12 PROCEDURE — 90715 TDAP VACCINE 7 YRS/> IM: CPT

## 2020-10-12 PROCEDURE — 90471 IMMUNIZATION ADMIN: CPT | Performed by: PHYSICIAN ASSISTANT

## 2020-10-12 PROCEDURE — 90471 IMMUNIZATION ADMIN: CPT

## 2020-10-12 PROCEDURE — 99215 OFFICE O/P EST HI 40 MIN: CPT | Mod: 25 | Performed by: PHYSICIAN ASSISTANT

## 2020-10-12 PROCEDURE — 80048 BASIC METABOLIC PNL TOTAL CA: CPT | Performed by: PHYSICIAN ASSISTANT

## 2020-10-12 PROCEDURE — 80076 HEPATIC FUNCTION PANEL: CPT | Performed by: PHYSICIAN ASSISTANT

## 2020-10-12 PROCEDURE — 85610 PROTHROMBIN TIME: CPT | Performed by: PHYSICIAN ASSISTANT

## 2020-10-12 ASSESSMENT — MIFFLIN-ST. JEOR: SCORE: 1629.58

## 2020-10-12 NOTE — PATIENT INSTRUCTIONS

## 2020-10-12 NOTE — LETTER
October 16, 2020      Ivan Bell  [address] [facility] [address]        Dear Ivan,    Your labs are stable for you and you are cleared for surgery.  Your platelets are low, your surgeon will want to monitor for excessive blood loss.     Please follow-up if you have any questions or concerns.     Resulted Orders   LDL cholesterol direct   Result Value Ref Range    LDL Cholesterol Direct 54 <100 mg/dL      Comment:      Desirable:       <100 mg/dl   CBC with platelets   Result Value Ref Range    WBC 3.3 (L) 4.0 - 11.0 10e9/L    RBC Count 3.58 (L) 4.4 - 5.9 10e12/L    Hemoglobin 11.5 (L) 13.3 - 17.7 g/dL    Hematocrit 35.1 (L) 40.0 - 53.0 %    MCV 98 78 - 100 fl    MCH 32.1 26.5 - 33.0 pg    MCHC 32.8 31.5 - 36.5 g/dL    RDW 17.1 (H) 10.0 - 15.0 %    Platelet Count 58 (L) 150 - 450 10e9/L      Comment:      Verified by smear review  Results confirmed by repeat test     INR   Result Value Ref Range    INR 1.66 (H) 0.86 - 1.14   Hepatic panel   Result Value Ref Range    Bilirubin Direct 1.1 (H) 0.0 - 0.2 mg/dL    Bilirubin Total 2.4 (H) 0.2 - 1.3 mg/dL    Albumin 2.4 (L) 3.4 - 5.0 g/dL    Protein Total 5.6 (L) 6.8 - 8.8 g/dL    Alkaline Phosphatase 148 40 - 150 U/L    ALT 21 0 - 70 U/L    AST 45 0 - 45 U/L   Basic metabolic panel   Result Value Ref Range    Sodium 145 (H) 133 - 144 mmol/L    Potassium 4.7 3.4 - 5.3 mmol/L    Chloride 116 (H) 94 - 109 mmol/L    Carbon Dioxide 26 20 - 32 mmol/L    Anion Gap 3 3 - 14 mmol/L    Glucose 93 70 - 99 mg/dL    Urea Nitrogen 13 7 - 30 mg/dL    Creatinine 1.20 0.66 - 1.25 mg/dL    GFR Estimate 67 >60 mL/min/[1.73_m2]      Comment:      Non  GFR Calc  Starting 12/18/2018, serum creatinine based estimated GFR (eGFR) will be   calculated using the Chronic Kidney Disease Epidemiology Collaboration   (CKD-EPI) equation.      GFR Estimate If Black 77 >60 mL/min/[1.73_m2]      Comment:       GFR Calc  Starting 12/18/2018, serum  creatinine based estimated GFR (eGFR) will be   calculated using the Chronic Kidney Disease Epidemiology Collaboration   (CKD-EPI) equation.      Calcium 8.7 8.5 - 10.1 mg/dL       If you have any questions or concerns, please call the clinic at the number listed above.       Sincerely,        Citlali Morgan PA-C

## 2020-10-12 NOTE — PROGRESS NOTES
Right knee total  3  SUBJECTIVE:   CC: Ivan Bell is an 55 year old male who presents for preventive health visit.     Patient has been advised of split billing requirements and indicates understanding: No  Healthy Habits:    Do you get at least three servings of calcium containing foods daily (dairy, green leafy vegetables, etc.)? yes    Amount of exercise or daily activities, outside of work: 7 day(s) per week    Problems taking medications regularly No    Medication side effects: No    Have you had an eye exam in the past two years? no    Do you see a dentist twice per year? yes    Do you have sleep apnea, excessive snoring or daytime drowsiness?no    Hypertension Follow-up      Do you check your blood pressure regularly outside of the clinic? No     Are you following a low salt diet? No    Are your blood pressures ever more than 140 on the top number (systolic) OR more   than 90 on the bottom number (diastolic), for example 140/90? unknown    *Hand pain x 4 months- surgery 2020 for trigger release    Today's PHQ-2 Score:   PHQ-2 (  [employer]) 2020   Q1: Little interest or pleasure in doing things 0 0   Q2: Feeling down, depressed or hopeless 0 0   PHQ-2 Score 0 0     Abuse: Current or Past(Physical, Sexual or Emotional)- No  Do you feel safe in your environment? Yes    Have you ever done Advance Care Planning? (For example, a Health Directive, POLST, or a discussion with a medical provider or your loved ones about your wishes): No, advance care planning information given to patient to review.  Patient plans to discuss their wishes with loved ones or provider.      Social History     Tobacco Use     Smoking status: Former Smoker     Packs/day: 0.00     Types: Dip, chew, snus or snuff     Quit date: 1998     Years since quittin.1     Smokeless tobacco: Current User     Types: Chew     Tobacco comment: 1 tin per 10 days.   Substance Use Topics     Alcohol use: No     Alcohol/week:  17.5 standard drinks     Types: 21 Cans of beer per week     Comment: last etoh 2/14/16, did have Odouls ~8/2017     If you drink alcohol do you typically have >3 drinks per day or >7 drinks per week? No                      Last PSA: No results found for: PSA    Reviewed orders with patient. Reviewed health maintenance and updated orders accordingly - Yes  BP Readings from Last 3 Encounters:   07/20/20 (!) 162/77   07/16/20 134/68   07/09/20 138/72    Wt Readings from Last 3 Encounters:   07/16/20 78 kg (172 lb)   07/09/20 78 kg (172 lb)   06/26/20 79.4 kg (175 lb)         Reviewed and updated as needed this visit by clinical staff               Sosa Miles CMA

## 2020-10-12 NOTE — PROGRESS NOTES
[facility]  [address]  [place] [address]  [phone]  Dept: [phone]    PRE-OP EVALUATION:  Today's date: 10/12/2020    Ivan Bell (: 1965) presents for pre-operative evaluation assessment as requested by Dr. Berg.  He requires evaluation and anesthesia risk assessment prior to undergoing surgery/procedure for treatment of Right knee pain .    Proposed Surgery/ Procedure: Right Knee  Date of Surgery/ Procedure: 10/29/20  Time of Surgery/ Procedure: [facility]  Hospital/Surgical Facility: [facility]  Surgery Fax Number:   Primary Physician: Too Washington  Type of Anesthesia Anticipated: Unsure    Preoperative Questionnaire:   No - Have you ever had a heart attack or stroke?  No - Have you ever had surgery on your heart or blood vessels, such as a stent, coronary (heart) bypass, or surgery on an artery in the head, neck, heart, or legs?  No - Do you have chest pain when you are physically active?  No - Do you have a history of heart failure?  No - Do you currently have a cold, bronchitis, or symptoms of other respiratory (head and chest) infections?  No - Do you have a cough, shortness of breath, or wheezing?  No - Do you or anyone in your family have a history of blood clots?  No - Do you or anyone in your family have a serious bleeding problem, such as long-lasting bleeding after surgeries or cuts?  No - Have you ever had anemia or been told to take iron pills?  No - Have you had any abnormal blood loss such as black, tarry or bloody stools, or abnormal vaginal bleeding?  YES- Have you ever had a blood transfusion?  Yes - Are you willing to have a blood transfusion if it is medically needed before, during, or after your surgery?  No - Have you or anyone in your family ever had problems with anesthesia (sedation for surgery)?  YES- drowsiness - Do you have sleep apnea, excessive snoring, or daytime drowsiness?   No - Do you have any  "artifical heart valves or other implanted medical devices, such as a pacemaker, defibrillator, or continuous glucose monitor?  No - Do you have any artifical joints?  No - Are you allergic to latex?  No - Is there any chance that you may be pregnant?    Patient has a Health Care Directive or Living Will:  NO    HPI:     HPI related to upcoming procedure:   He has been followed by orthopedist at [facility] for osteoarthritis of both knees (grade 4 on right and grade 2 on left).  He has failed conservative treatments (oral NSAIDs, physical therapy and cortisone shots) and partial knee replacement has been advised.        HYPERLIPIDEMIA - Patient has a long history of significant Hyperlipidemia not currently on statin.     HYPERTENSION - Patient has longstanding history of HTN , currently denies any symptoms referable to elevated blood pressure. Specifically denies chest pain, palpitations, dyspnea, orthopnea, PND or peripheral edema. Blood pressure readings have been in normal range. Current medication regimen is as listed below. Patient denies any side effects of medication.     RENAL INSUFFICIENCY - Patient has a longstanding history of moderate-severe chronic renal insufficiency. Last Cr 1.20.     Hepatic Encephalopathy:  Managed by hepatologist, Dr. Gonzalo Miramontes.    Message from care coordinator on 10/6/20:    \"Patient returned call. He states he has been doing okay, feeling tired and poor sleep at times. He will be having a knee replacement at the end of October, and his surgeon is aware of his liver disease. Discussed need for additional attention to signs of encephalopathy after anesthesia and while taking pain medications, and strategies to use including additional lactulose as needed. He is not having issues with significant fluid retention, and continues to work. He agrees to follow up and will be scheduled at 0930 on 11/24.\"      MEDICAL HISTORY:     Patient Active Problem List    Diagnosis " Date Noted     Thrombocytopenia (H) 06/05/2020     Priority: Medium     Trigger finger, acquired 06/01/2020     Priority: Medium     Added automatically from request for surgery [id number]       CKD (chronic kidney disease) stage 2, GFR 60-89 ml/min 04/17/2020     Priority: Medium     Seasonal allergic rhinitis, unspecified trigger 04/17/2020     Priority: Medium     Hypokalemia 12/31/2019     Priority: Medium     Decompensation of cirrhosis of liver (H) 03/10/2019     Priority: Medium     Calculus of gallbladder without cholecystitis 09/23/2018     Priority: Medium     September 23, 2018 non-obstructing, incidental finding on us.        Nephrolithiasis 09/23/2018     Priority: Medium     September 23, 2018 non-obstructing, incident finding on us.        Hepatic encephalopathy (H) 06/10/2018     Priority: Medium     Alcoholic cirrhosis of liver with ascites (H) 03/08/2016     Priority: Medium     September 23, 2018 now sober, ascites resolved, S/P TIP procedure 6/2018. Normal liver enzymes, diminished liver function.  INR 1.6, bilirubin 2.5, albumin 2.6. He  appears healthy. Doing well. Stressed the importance of abstaining from alcohol. See copy of recent us.     9/10/18:  Impression:   1. Patent TIPS with appropriate in stent velocities. Normal Doppler  evaluation of the remainder of the hepatic vasculature in the setting  of TIPS.  2. Cirrhotic hepatic morphology with evidence of portal hypertension,  including splenomegaly. No focal hepatic mass.  3. Cholelithiasis without evidence of cholecystitis.  4. Bilateral nonobstructing nephrolithiasis.            Hypertension goal BP (blood pressure) < 140/90 12/18/2012     Priority: Medium     24 hour contact given to patient  01/02/2012     Priority: Medium     EMERGENCY CARE PLAN  Presenting Problem Signs and Symptoms Treatment Plan    Questions or conerns during clinic hours    I will call the clinic directly     Questions or conerns outside clinic hours    I will call  the 24 hour nurse line at [phone]    Patient needs to schedule an appointment    I will call the 24 hour scheduling team at [phone] or clinic directly    Same day treatment     I will call the clinic first, nurse line if after hours, urgent care and express care if needed                                 CARDIOVASCULAR SCREENING; LDL GOAL LESS THAN 130 10/31/2010     Priority: Medium     Erectile dysfunction 01/29/2008     Priority: Medium     September 23, 2018 no known CAD, no contraindications to sildenafil.        Gouty arthropathy 12/31/2006     Priority: Medium     Problem list name updated by automated process. Provider to review and confirm  [employer] Update utility       Hypertriglyceridemia 08/03/2005     Priority: Medium     Last Exam: December 19, 2007  Last Lipids: CHOL      211   01/25/2007 LDL      104   01/25/2007 HDL       83   01/25/2007  Last LFTs:: AST      106   01/25/2007   ALT       53   01/25/2007    TRIG      120   01/25/2007  TRIG      115   01/16/2006  Meds: Lopid 600 bid - tolerating        Past Medical History:   Diagnosis Date     Alcoholic cirrhosis (H)      Alcoholic hepatitis 03/2019     Gout      Hypertension      Hypertriglyceridemia      Left calcaneus fracture 1/9/2006 January 16, 2006: Fell 10 feet from ladder onto left foot on frozen ground on 1/9/06 at home.  Immediate pain and unable to walk- seen at [place] and diagnosed with calcaneus fracture     Portal vein thrombosis     left occlusion, partial main     Past Surgical History:   Procedure Laterality Date     ANKLE SURGERY Left      COLONOSCOPY N/A 3/31/2016    Procedure: COLONOSCOPY;  Surgeon: Rhys Uriostegui MD;  Location:  GI     ESOPHAGOSCOPY, GASTROSCOPY, DUODENOSCOPY (EGD), COMBINED N/A 3/31/2016    Procedure: COMBINED ESOPHAGOSCOPY, GASTROSCOPY, DUODENOSCOPY (EGD);  Surgeon: Rhys Uriostegui MD;  Location:  GI     ESOPHAGOSCOPY, GASTROSCOPY, DUODENOSCOPY (EGD), COMBINED N/A  3/9/2018    Procedure: COMBINED ESOPHAGOSCOPY, GASTROSCOPY, DUODENOSCOPY (EGD), BIOPSY SINGLE OR MULTIPLE;  EGD;  Surgeon: Gonzalo Wahl MD;  Location:  GI     ESOPHAGOSCOPY, GASTROSCOPY, DUODENOSCOPY (EGD), COMBINED N/A 6/7/2019    Procedure: ESOPHAGOGASTRODUODENOSCOPY (EGD);  Surgeon: Gonzalo Wahl MD;  Location: [facility]     IR PARACENTESIS  10/29/2019     IR TRANSVEN INTRAHEPATIC PORTOSYST REV  10/29/2019     KNEE SURGERY Left      KNEE SURGERY Right      RELEASE TRIGGER FINGER Right 6/11/2020    Procedure: RELEASE, TRIGGER FINGER, right ring and long finger;  Surgeon: Pascual Valencia MD;  Location: [facility] OR     SIGMOIDOSCOPY FLEXIBLE N/A 10/31/2017    Procedure: SIGMOIDOSCOPY FLEXIBLE;;  Surgeon: Armaan Adams MD;  Location: [facility]     TIPS Procedure  06/06/2018     Current Outpatient Medications   Medication Sig Dispense Refill     acetaminophen (TYLENOL) 500 MG tablet Take 1,000 mg by mouth every 6 hours as needed for mild pain       Ascorbic Acid (VITAMIN C PO) Take by mouth daily       fexofenadine (ALLEGRA) 60 MG tablet Take 1 tablet (60 mg) by mouth daily 30 tablet 1     furosemide (LASIX) 20 MG tablet Take 2 tablets (40 mg) by mouth daily 180 tablet 3     lactulose (CONSTULOSE) 10 GM/15ML solution Take 30 mLs (20 g) by mouth daily       Menaquinone-7 (VITAMIN K2) 100 MCG CAPS Take 200 mcg by mouth daily        MULTIPLE VITAMINS PO Take 1 tablet by mouth daily       order for DME Equipment being ordered: patellar stabilization knee brace with horseshoe, Large 1 Device 0     potassium chloride ER (KLOR-CON M) 20 MEQ CR tablet Take 2 tablets (40 mEq) by mouth daily 120 tablet 3     senna-docusate (SENOKOT-S/PERICOLACE) 8.6-50 MG tablet Take 1-2 tablets by mouth 2 times daily 30 tablet 0     sertraline (ZOLOFT) 50 MG tablet TAKE 1/2 TABLET BY MOUTH ONCE DAILY 30 tablet 3     sodium bicarbonate 650 MG tablet Take 1 tablet (650 mg) by mouth 2 times daily 180 tablet 3     spironolactone  "(ALDACTONE) 50 MG tablet Take 1 tablet (50 mg) by mouth daily 90 tablet 3     Thiamine HCl (B-1) 100 MG TABS Take 1 tablet by mouth daily 90 tablet 3     vitamin D3 (CHOLECALCIFEROL) 2000 units (50 mcg) tablet Take 1 tablet by mouth daily       rifaximin (XIFAXAN) 550 MG TABS tablet Take 1 tablet (550 mg) by mouth 2 times daily 180 tablet 3     OTC products: None, except as noted above    Allergies   Allergen Reactions     Prednisone Visual Disturbance     Trazodone Visual Disturbance     Benadryl [Diphenhydramine] Other (See Comments)     Delirium (visual and auditory hallucinations)     Oxycodone Other (See Comments)     Delirium and constipation      Latex Allergy: NO    Social History     Tobacco Use     Smoking status: Former Smoker     Packs/day: 0.00     Types: Dip, chew, snus or snuff     Quit date: 1998     Years since quittin.1     Smokeless tobacco: Current User     Types: Chew     Tobacco comment: 1 tin per 10 days.   Substance Use Topics     Alcohol use: No     Alcohol/week: 17.5 standard drinks     Types: 21 Cans of beer per week     Comment: last etoh 16, did have Odouls ~2017     History   Drug Use No       REVIEW OF SYSTEMS:   Constitutional, neuro, ENT, endocrine, pulmonary, cardiac, gastrointestinal, genitourinary, musculoskeletal, integument and psychiatric systems are negative, except as otherwise noted.    EXAM:   /66   Pulse 71   Temp 98  F (36.7  C) (Tympanic)   Resp 15   Ht 1.778 m (5' 10\")   Wt 79.8 kg (176 lb)   BMI 25.25 kg/m      GENERAL APPEARANCE: healthy, alert and no distress     EYES: EOMI,  PERRL     HENT: ear canals and TM's normal and nose and mouth without ulcers or lesions     NECK: no adenopathy, no asymmetry, masses, or scars and thyroid normal to palpation     RESP: lungs clear to auscultation - no rales, rhonchi or wheezes     CV: regular rates and rhythm, normal S1 S2, no S3 or S4 and no murmur, click or rub     ABDOMEN:  soft, nontender, no " HSM or masses and bowel sounds normal     MS: extremities normal- no gross deformities noted, no evidence of inflammation in joints, FROM in all extremities.     SKIN: no suspicious lesions or rashes     NEURO: Normal strength and tone, sensory exam grossly normal, mentation intact and speech normal     PSYCH: mentation appears normal. and affect normal/bright     LYMPHATICS: No cervical adenopathy    DIAGNOSTICS:     EKG: Not indicated due to non-vascular surgery and last ekg on 12/31/2019 (within 30 days for CAD history or last year for cardiac risk factors)  Labs Resulted Today:   Results for orders placed or performed in visit on 10/12/20   LDL cholesterol direct     Status: None   Result Value Ref Range    LDL Cholesterol Direct 54 <100 mg/dL   CBC with platelets     Status: Abnormal   Result Value Ref Range    WBC 3.3 (L) 4.0 - 11.0 10e9/L    RBC Count 3.58 (L) 4.4 - 5.9 10e12/L    Hemoglobin 11.5 (L) 13.3 - 17.7 g/dL    Hematocrit 35.1 (L) 40.0 - 53.0 %    MCV 98 78 - 100 fl    MCH 32.1 26.5 - 33.0 pg    MCHC 32.8 31.5 - 36.5 g/dL    RDW 17.1 (H) 10.0 - 15.0 %    Platelet Count 58 (L) 150 - 450 10e9/L   INR     Status: Abnormal   Result Value Ref Range    INR 1.66 (H) 0.86 - 1.14   Hepatic panel     Status: Abnormal   Result Value Ref Range    Bilirubin Direct 1.1 (H) 0.0 - 0.2 mg/dL    Bilirubin Total 2.4 (H) 0.2 - 1.3 mg/dL    Albumin 2.4 (L) 3.4 - 5.0 g/dL    Protein Total 5.6 (L) 6.8 - 8.8 g/dL    Alkaline Phosphatase 148 40 - 150 U/L    ALT 21 0 - 70 U/L    AST 45 0 - 45 U/L   Basic metabolic panel     Status: Abnormal   Result Value Ref Range    Sodium 145 (H) 133 - 144 mmol/L    Potassium 4.7 3.4 - 5.3 mmol/L    Chloride 116 (H) 94 - 109 mmol/L    Carbon Dioxide 26 20 - 32 mmol/L    Anion Gap 3 3 - 14 mmol/L    Glucose 93 70 - 99 mg/dL    Urea Nitrogen 13 7 - 30 mg/dL    Creatinine 1.20 0.66 - 1.25 mg/dL    GFR Estimate 67 >60 mL/min/[1.73_m2]    GFR Estimate If Black 77 >60 mL/min/[1.73_m2]    Calcium  8.7 8.5 - 10.1 mg/dL     Labs Drawn and in Process:       Recent Labs   Lab Test 09/15/20  1111 08/06/20  1345 05/27/20  1118 05/27/20  1118 04/29/20  1353   HGB  --  11.1*  --  12.0* 11.2*   PLT  --  59*  --  59* 53*   INR  --  1.75*  --   --  1.99*    145*   < >  --  143   POTASSIUM 4.3 3.9   < >  --  5.0   CR 1.12 1.25   < >  --  1.33*    < > = values in this interval not displayed.        IMPRESSION:   Reason for surgery/procedure: osteoarthritis knee  Diagnosis/reason for consult: preop    The proposed surgical procedure is considered INTERMEDIATE risk.    REVISED CARDIAC RISK INDEX  The patient has the following serious cardiovascular risks for perioperative complications such as (MI, PE, VFib and 3  AV Block):  No serious cardiac risks  INTERPRETATION: 0 risks: Class I (very low risk - 0.4% complication rate)    The patient has the following additional risks for perioperative complications:    Thrombocytopenia.  Platelets are low but stable for Ivan.  Surgeon should be aware of this level and have FFP if needed.     Hepatic Encephalopathy: per GI, need for additional attention to signs of encephalopathy after anesthesia and while taking pain medications may need additional lactulose as needed.      ICD-10-CM    1. Preop general physical exam  Z01.818 CBC with platelets   2. Screening for hyperlipidemia  Z13.220 LDL cholesterol direct   3. Need for vaccination  Z23 C RIV4 (FLUBLOK) VACCINE RECOMBINANT DNA PRSRV ANTIBIO FREE, IM [[id number]]     VACCINE ADMINISTRATION, INITIAL     VACCINE ADMINISTRATION, EACH ADDITIONAL     TDAP VACCINE     CANCELED: TDAP VACCINE (ADACEL)   4. Hypertension goal BP (blood pressure) < 140/90  I10 Basic metabolic panel   5. CKD (chronic kidney disease) stage 2, GFR 60-89 ml/min  N18.2 Basic metabolic panel   6. Trigger finger, acquired  M65.30 Orthopedic [facility] Spine  Referral   7. Decompensation of cirrhosis of liver (H)  K72.90 INR    K74.60 Hepatic panel     Basic  metabolic panel       RECOMMENDATIONS:     --Consult hospital rounder / IM to assist post-op medical management    --Patient is to take all scheduled medications on the day of surgery EXCEPT for modifications listed below.    APPROVAL GIVEN to proceed with proposed procedure, without further diagnostic evaluation       Signed Electronically by: Citlali Morgan PA-C    Copy of this evaluation report is provided to requesting physician.    [place] Preop Guidelines    Revised Cardiac Risk Index

## 2020-10-20 ENCOUNTER — PATIENT OUTREACH (OUTPATIENT)
Dept: GASTROENTEROLOGY | Facility: CLINIC | Age: 55
End: 2020-10-20

## 2020-10-20 DIAGNOSIS — K70.31 ALCOHOLIC CIRRHOSIS OF LIVER WITH ASCITES (H): Primary | ICD-10-CM

## 2020-11-02 ENCOUNTER — OFFICE VISIT (OUTPATIENT)
Dept: GASTROENTEROLOGY | Facility: CLINIC | Age: 55
End: 2020-11-02
Attending: INTERNAL MEDICINE
Payer: COMMERCIAL

## 2020-11-02 VITALS
OXYGEN SATURATION: 100 % | HEIGHT: 70 IN | BODY MASS INDEX: 25.17 KG/M2 | SYSTOLIC BLOOD PRESSURE: 145 MMHG | WEIGHT: 175.8 LBS | TEMPERATURE: 98.1 F | HEART RATE: 80 BPM | DIASTOLIC BLOOD PRESSURE: 71 MMHG

## 2020-11-02 DIAGNOSIS — K70.31 ALCOHOLIC CIRRHOSIS OF LIVER WITH ASCITES (H): ICD-10-CM

## 2020-11-02 DIAGNOSIS — Z95.828 S/P TIPS (TRANSJUGULAR INTRAHEPATIC PORTOSYSTEMIC SHUNT): ICD-10-CM

## 2020-11-02 DIAGNOSIS — K70.31 ALCOHOLIC CIRRHOSIS OF LIVER WITH ASCITES (H): Primary | ICD-10-CM

## 2020-11-02 PROBLEM — E87.6 HYPOKALEMIA: Status: RESOLVED | Noted: 2019-12-31 | Resolved: 2020-11-02

## 2020-11-02 PROBLEM — K74.60 DECOMPENSATION OF CIRRHOSIS OF LIVER (H): Status: RESOLVED | Noted: 2019-03-10 | Resolved: 2020-11-02

## 2020-11-02 PROBLEM — K76.82 HEPATIC ENCEPHALOPATHY (H): Status: RESOLVED | Noted: 2018-06-10 | Resolved: 2020-11-02

## 2020-11-02 PROBLEM — K72.90 DECOMPENSATION OF CIRRHOSIS OF LIVER (H): Status: RESOLVED | Noted: 2019-03-10 | Resolved: 2020-11-02

## 2020-11-02 PROBLEM — M65.30 TRIGGER FINGER, ACQUIRED: Status: RESOLVED | Noted: 2020-06-01 | Resolved: 2020-11-02

## 2020-11-02 PROBLEM — K80.20 CALCULUS OF GALLBLADDER WITHOUT CHOLECYSTITIS: Status: RESOLVED | Noted: 2018-09-23 | Resolved: 2020-11-02

## 2020-11-02 LAB
ALBUMIN SERPL-MCNC: 2.2 G/DL (ref 3.4–5)
ALP SERPL-CCNC: 136 U/L (ref 40–150)
ALT SERPL W P-5'-P-CCNC: 19 U/L (ref 0–70)
ANION GAP SERPL CALCULATED.3IONS-SCNC: 4 MMOL/L (ref 3–14)
AST SERPL W P-5'-P-CCNC: 39 U/L (ref 0–45)
BILIRUB DIRECT SERPL-MCNC: 1.1 MG/DL (ref 0–0.2)
BILIRUB SERPL-MCNC: 2.2 MG/DL (ref 0.2–1.3)
BUN SERPL-MCNC: 17 MG/DL (ref 7–30)
CALCIUM SERPL-MCNC: 8.4 MG/DL (ref 8.5–10.1)
CHLORIDE SERPL-SCNC: 117 MMOL/L (ref 94–109)
CO2 SERPL-SCNC: 24 MMOL/L (ref 20–32)
CREAT SERPL-MCNC: 1.22 MG/DL (ref 0.66–1.25)
ERYTHROCYTE [DISTWIDTH] IN BLOOD BY AUTOMATED COUNT: 17.2 % (ref 10–15)
GFR SERPL CREATININE-BSD FRML MDRD: 65 ML/MIN/{1.73_M2}
GLUCOSE SERPL-MCNC: 213 MG/DL (ref 70–99)
HCT VFR BLD AUTO: 33.8 % (ref 40–53)
HGB BLD-MCNC: 10.9 G/DL (ref 13.3–17.7)
INR PPP: 1.77 (ref 0.86–1.14)
MCH RBC QN AUTO: 32.2 PG (ref 26.5–33)
MCHC RBC AUTO-ENTMCNC: 32.2 G/DL (ref 31.5–36.5)
MCV RBC AUTO: 100 FL (ref 78–100)
PLATELET # BLD AUTO: 58 10E9/L (ref 150–450)
POTASSIUM SERPL-SCNC: 4.5 MMOL/L (ref 3.4–5.3)
PROT SERPL-MCNC: 5.4 G/DL (ref 6.8–8.8)
RBC # BLD AUTO: 3.38 10E12/L (ref 4.4–5.9)
SODIUM SERPL-SCNC: 144 MMOL/L (ref 133–144)
WBC # BLD AUTO: 3.2 10E9/L (ref 4–11)

## 2020-11-02 PROCEDURE — G0463 HOSPITAL OUTPT CLINIC VISIT: HCPCS

## 2020-11-02 PROCEDURE — 85027 COMPLETE CBC AUTOMATED: CPT | Performed by: PATHOLOGY

## 2020-11-02 PROCEDURE — 80076 HEPATIC FUNCTION PANEL: CPT | Performed by: PATHOLOGY

## 2020-11-02 PROCEDURE — 99214 OFFICE O/P EST MOD 30 MIN: CPT | Performed by: INTERNAL MEDICINE

## 2020-11-02 PROCEDURE — 36415 COLL VENOUS BLD VENIPUNCTURE: CPT | Performed by: PATHOLOGY

## 2020-11-02 PROCEDURE — 80048 BASIC METABOLIC PNL TOTAL CA: CPT | Performed by: PATHOLOGY

## 2020-11-02 PROCEDURE — 85610 PROTHROMBIN TIME: CPT | Performed by: PATHOLOGY

## 2020-11-02 ASSESSMENT — PAIN SCALES - GENERAL: PAINLEVEL: NO PAIN (0)

## 2020-11-02 ASSESSMENT — MIFFLIN-ST. JEOR: SCORE: 1628.67

## 2020-11-02 NOTE — NURSING NOTE
"Chief Complaint   Patient presents with     RECHECK     Hepatic encephalopathy      BP (!) 145/71   Pulse 80   Temp 98.1  F (36.7  C) (Oral)   Ht 1.778 m (5' 10\")   Wt 79.7 kg (175 lb 12.8 oz)   SpO2 100%   BMI 25.22 kg/m    Ivonne Ogden, [facility]  11/2/2020 9:30 AM    "

## 2020-11-02 NOTE — LETTER
11/2/2020         RE: Ivan Bell  [id number] [facility] [id number]      [facility]    Hepatology follow-up    Follow-up visit for cirrhosis    Subjective:  55 year old male    Cirrhosis  - ETOH  - hx ascites, TIPS placement 5/14/18, 6/6/18; TIPS revision 10/29/19  - hx HE  - hx variceal bleed Oct 2017  - ETOH hepatitis March 2019  - last EGD Jun 2019- dimin EV, mild portal hypertensive gastropathy  - HCC screening- liver TIPS doppler U/S Aug 2020    The patient comes to clinic this morning with his wife for follow-up of cirrhosis.  Last visit 05/2020.  The patient underwent trigger finger release under MAC in 06/2020.  He was noted to have ascites and abnormal TIPS velocities on ultrasound in 08/2020, which resulted in an increase of his furosemide dose.  More recently, the patient has been having issues with his right knee and will require a partial knee replacement.  No other medication changes, no ER visits or hospital admissions since last clinic visit.      The patient is well today.  He reports that he did have some mild abdominal distention in 08/2020 when he had his TIPS and was noted to have ascites.  He feels that this has improved since increasing the doses of his diuretics.  He denies any lower extremity edema.      The patient reports that he has been having issues with his right knee for several years.  This has significantly worsened over the past couple of months.  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  His anticipated surgery would be performed at [facility] and would involve 10 weeks of intensive physical therapy in the post-operative period.      The patient denies any lethargy or confusion.  He is taking lactulose and rifaximin as prescribed and is moving his bowels 3-4 times per day.      The patient denies jaundice, melena, hematemesis or  hematochezia.      The patient denies fevers, sweats or chills.  No cough or dyspnea.      Weight has been stable.  Appetite is good.      The patient has not drank any alcohol since 02/2019.  He wears a mask when out in public and is practicing social distancing.  He quit chewing tobacco 2 weeks ago.       Medical hx Surgical hx   Past Medical History:   Diagnosis Date     Alcoholic cirrhosis (H)      Alcoholic hepatitis 03/2019     Gout      Hypertension      Hypertriglyceridemia      Left calcaneus fracture 1/9/2006 January 16, 2006: Fell 10 feet from ladder onto left foot on frozen ground on 1/9/06 at home.  Immediate pain and unable to walk- seen at [place] and diagnosed with calcaneus fracture     Portal vein thrombosis     left occlusion, partial main      Past Surgical History:   Procedure Laterality Date     ANKLE SURGERY Left      COLONOSCOPY N/A 3/31/2016    Procedure: COLONOSCOPY;  Surgeon: Rhys Uriostegui MD;  Location:  GI     ESOPHAGOSCOPY, GASTROSCOPY, DUODENOSCOPY (EGD), COMBINED N/A 3/31/2016    Procedure: COMBINED ESOPHAGOSCOPY, GASTROSCOPY, DUODENOSCOPY (EGD);  Surgeon: Rhys Uriostegui MD;  Location:  GI     ESOPHAGOSCOPY, GASTROSCOPY, DUODENOSCOPY (EGD), COMBINED N/A 3/9/2018    Procedure: COMBINED ESOPHAGOSCOPY, GASTROSCOPY, DUODENOSCOPY (EGD), BIOPSY SINGLE OR MULTIPLE;  EGD;  Surgeon: Gonzalo Wahl MD;  Location:  GI     ESOPHAGOSCOPY, GASTROSCOPY, DUODENOSCOPY (EGD), COMBINED N/A 6/7/2019    Procedure: ESOPHAGOGASTRODUODENOSCOPY (EGD);  Surgeon: Gonzalo Wahl MD;  [facility]     IR PARACENTESIS  10/29/2019     IR TRANSVEN INTRAHEPATIC PORTOSYST REV  10/29/2019     KNEE SURGERY Left      KNEE SURGERY Right      RELEASE TRIGGER FINGER Right 6/11/2020    Procedure: RELEASE, TRIGGER FINGER, right ring and long finger;  Surgeon: Pascual Valencia MD;  Location: [facility] OR     SIGMOIDOSCOPY FLEXIBLE N/A 10/31/2017    Procedure:  SIGMOIDOSCOPY FLEXIBLE;;  Surgeon: Armaan Adams MD;  Location: [facility]     TIPS Procedure  06/06/2018          Medications  Prior to Admission medications    Medication Sig Start Date End Date Taking? Authorizing Provider   acetaminophen (TYLENOL) 500 MG tablet Take 1,000 mg by mouth every 6 hours as needed for mild pain   Yes Reported, Patient   Ascorbic Acid (VITAMIN C PO) Take by mouth daily   Yes Reported, Patient   fexofenadine (ALLEGRA) 60 MG tablet Take 1 tablet (60 mg) by mouth daily 4/17/20  Yes Citlali Morgan PA-C   furosemide (LASIX) 20 MG tablet Take 2 tablets (40 mg) by mouth daily 8/24/20  Yes Gonzalo Wahl MD   lactulose (CONSTULOSE) 10 GM/15ML solution Take 30 mLs (20 g) by mouth daily 6/5/20  Yes Citlali Morgan PA-C   Menaquinone-7 (VITAMIN K2) 100 MCG CAPS Take 200 mcg by mouth daily    Yes Reported, Patient   MULTIPLE VITAMINS PO Take 1 tablet by mouth daily   Yes Reported, Patient   order for DME Equipment being ordered: patellar stabilization knee brace with horseshoe, Large 7/16/20  Yes Edna Kelley MD   potassium chloride ER (KLOR-CON M) 20 MEQ CR tablet Take 2 tablets (40 mEq) by mouth daily 8/24/20  Yes Gonzalo Wahl MD   rifaximin (XIFAXAN) 550 MG TABS tablet Take 1 tablet (550 mg) by mouth 2 times daily 10/12/20  Yes Gonzalo Wahl MD   senna-docusate (SENOKOT-S/PERICOLACE) 8.6-50 MG tablet Take 1-2 tablets by mouth 2 times daily 6/11/20  Yes Pascual Valencia MD   sertraline (ZOLOFT) 50 MG tablet TAKE 1/2 TABLET BY MOUTH ONCE DAILY 6/8/20  Yes Gonzalo Wahl MD   sodium bicarbonate 650 MG tablet Take 1 tablet (650 mg) by mouth 2 times daily 8/10/20  Yes Alma Moses MD   spironolactone (ALDACTONE) 50 MG tablet Take 1 tablet (50 mg) by mouth daily 5/13/20  Yes Gonzalo Wahl MD   Thiamine HCl (B-1) 100 MG TABS Take 1 tablet by mouth daily 12/16/19  Yes Too Washington DO   vitamin D3 (CHOLECALCIFEROL)  "2000 units (50 mcg) tablet Take 1 tablet by mouth daily   Yes Unknown, Entered By History       Allergies  Allergies   Allergen Reactions     Prednisone Visual Disturbance     Trazodone Visual Disturbance     Benadryl [Diphenhydramine] Other (See Comments)     Delirium (visual and auditory hallucinations)     Oxycodone Other (See Comments)     Delirium and constipation       Review of systems  A 10-point review of systems was negative    Examination  BP (!) 145/71   Pulse 80   Temp 98.1  F (36.7  C) (Oral)   Ht 1.778 m (5' 10\")   Wt 79.7 kg (175 lb 12.8 oz)   SpO2 100%   BMI 25.22 kg/m    Body mass index is 25.22 kg/m .    Gen- well, NAD, A+Ox3, normal color  CVS- RRR, no m/r/g, no added sounds  RS- good air entry bilaterally, no wheeze or crackles  Abd- non-distended, soft, non-tender, small reducible umbilical hernia, no obvious ascites or organomegaly on palpation or percussion, BS+  Extr- hands normal, no BESSY  Skin- no rash or jaundice  Neuro- no asterixis  Psych- normal mood    Laboratory  Lab Results   Component Value Date     11/02/2020    POTASSIUM 4.5 11/02/2020    CHLORIDE 117 11/02/2020    CO2 24 11/02/2020    BUN 17 11/02/2020    CR 1.22 11/02/2020       Lab Results   Component Value Date    BILITOTAL 2.2 11/02/2020    ALT 19 11/02/2020    AST 39 11/02/2020    ALKPHOS 136 11/02/2020       Lab Results   Component Value Date    ALBUMIN 2.2 11/02/2020    PROTTOTAL 5.4 11/02/2020        Lab Results   Component Value Date    WBC 3.2 11/02/2020    HGB 10.9 11/02/2020     11/02/2020    PLT 58 11/02/2020       Lab Results   Component Value Date    INR 1.77 11/02/2020       Radiology  Liver TIPS U/S Aug 2020 reviewed    Assessment  55-year-old male who presents for followup of decompensated alcoholic cirrhosis complicated with ascites, hepatic encephalopathy and variceal bleeding.  MELD-Na= 18 (improved).  Last ETOH= 02/2019.  MELD score is likely related to serum creatinine related to increase " dose of diuretics.  Will obtain follow-up ultrasound to evaluate for resolution of ascites and to evaluate for TIPS occlusion.  The patient will need a patent TIPS in order to undergo right partial knee replacement as planned.  Hepatic encephalopathy is well-controlled on current medications.  It will be of the utmost importance for the patient to have regular bowel movements in the post-operative period, particularly given his issues in the past with delirium (usually related to medications).  Up to date with HCC screening.      We discussed that patients with cirrhosis are at a higher risk for complications after surgery, including delayed healing and post-operative infections and the risks of acute changes in portal pressures related to induction of anesthesia that can occur in patients with cirrhosis, for which it will be important to ensure a functioning, patent TIPS prior to surgery.  We also discussed the possibility of liver transplant evaluation if the patient decompensates after surgery.  The patient has not completed chemical dependency assessment as advised.    Plan  1.  Continue diuretics at current doses for now  2.  Liver TIPS doppler U/S  3.  Consider TIPS revision prior to surgery  4.  Low Na diet  5.  Continue lactulose and rifaximin  6.  Aggressive bowel regimen following surgery  7.  Follow-up in 3 months    Gonzalo Bales MD  Hepatology  [facility]    Addendum 11/3/2020- liver TIPS doppler reviewed- evidence of ascites and abnormal shunt velocities.  Will need TIPS revision prior to surgery.        Gonzalo Bales MD

## 2020-11-02 NOTE — PROGRESS NOTES
[facility]    Hepatology follow-up    Follow-up visit for cirrhosis    Subjective:  55 year old male    Cirrhosis  - ETOH  - hx ascites, TIPS placement 5/14/18, 6/6/18; TIPS revision 10/29/19  - hx HE  - hx variceal bleed Oct 2017  - ETOH hepatitis March 2019  - last EGD Jun 2019- dimin EV, mild portal hypertensive gastropathy  - HCC screening- liver TIPS doppler U/S Aug 2020    The patient comes to clinic this morning with his wife for follow-up of cirrhosis.  Last visit 05/2020.  The patient underwent trigger finger release under MAC in 06/2020.  He was noted to have ascites and abnormal TIPS velocities on ultrasound in 08/2020, which resulted in an increase of his furosemide dose.  More recently, the patient has been having issues with his right knee and will require a partial knee replacement.  No other medication changes, no ER visits or hospital admissions since last clinic visit.      The patient is well today.  He reports that he did have some mild abdominal distention in 08/2020 when he had his TIPS and was noted to have ascites.  He feels that this has improved since increasing the doses of his diuretics.  He denies any lower extremity edema.      The patient reports that he has been having issues with his right knee for several years.  This has significantly worsened over the past couple of months.  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  His anticipated surgery would be performed at [facility] and would involve 10 weeks of intensive physical therapy in the post-operative period.      The patient denies any lethargy or confusion.  He is taking lactulose and rifaximin as prescribed and is moving his bowels 3-4 times per day.      The patient denies jaundice, melena, hematemesis or hematochezia.      The patient denies fevers, sweats or chills.  No cough or dyspnea.      Weight  has been stable.  Appetite is good.      The patient has not drank any alcohol since 02/2019.  He wears a mask when out in public and is practicing social distancing.  He quit chewing tobacco 2 weeks ago.       Medical hx Surgical hx   Past Medical History:   Diagnosis Date     Alcoholic cirrhosis (H)      Alcoholic hepatitis 03/2019     Gout      Hypertension      Hypertriglyceridemia      Left calcaneus fracture 1/9/2006 January 16, 2006: Fell 10 feet from ladder onto left foot on frozen ground on 1/9/06 at home.  Immediate pain and unable to walk- seen at [place] and diagnosed with calcaneus fracture     Portal vein thrombosis     left occlusion, partial main      Past Surgical History:   Procedure Laterality Date     ANKLE SURGERY Left      COLONOSCOPY N/A 3/31/2016    Procedure: COLONOSCOPY;  Surgeon: Rhys Uriostegui MD;  Location:  GI     ESOPHAGOSCOPY, GASTROSCOPY, DUODENOSCOPY (EGD), COMBINED N/A 3/31/2016    Procedure: COMBINED ESOPHAGOSCOPY, GASTROSCOPY, DUODENOSCOPY (EGD);  Surgeon: Rhys Uriostegui MD;  Location:  GI     ESOPHAGOSCOPY, GASTROSCOPY, DUODENOSCOPY (EGD), COMBINED N/A 3/9/2018    Procedure: COMBINED ESOPHAGOSCOPY, GASTROSCOPY, DUODENOSCOPY (EGD), BIOPSY SINGLE OR MULTIPLE;  EGD;  Surgeon: Gonzalo Wahl MD;  Location:  GI     ESOPHAGOSCOPY, GASTROSCOPY, DUODENOSCOPY (EGD), COMBINED N/A 6/7/2019    Procedure: ESOPHAGOGASTRODUODENOSCOPY (EGD);  Surgeon: Gonzalo Wahl MD;  Location:  GI     IR PARACENTESIS  10/29/2019     IR TRANSVEN INTRAHEPATIC PORTOSYST REV  10/29/2019     KNEE SURGERY Left      KNEE SURGERY Right      RELEASE TRIGGER FINGER Right 6/11/2020    Procedure: RELEASE, TRIGGER FINGER, right ring and long finger;  Surgeon: Pascual Valencia MD;  Location: [facility]     SIGMOIDOSCOPY FLEXIBLE N/A 10/31/2017    Procedure: SIGMOIDOSCOPY FLEXIBLE;;  Surgeon: Armaan Adams MD;  Location:  GI     TIPS Procedure  06/06/2018           Medications  Prior to Admission medications    Medication Sig Start Date End Date Taking? Authorizing Provider   acetaminophen (TYLENOL) 500 MG tablet Take 1,000 mg by mouth every 6 hours as needed for mild pain   Yes Reported, Patient   Ascorbic Acid (VITAMIN C PO) Take by mouth daily   Yes Reported, Patient   fexofenadine (ALLEGRA) 60 MG tablet Take 1 tablet (60 mg) by mouth daily 4/17/20  Yes Citlali Morgan PA-C   furosemide (LASIX) 20 MG tablet Take 2 tablets (40 mg) by mouth daily 8/24/20  Yes Gonzalo Wahl MD   lactulose (CONSTULOSE) 10 GM/15ML solution Take 30 mLs (20 g) by mouth daily 6/5/20  Yes Citlali Morgan PA-C   Menaquinone-7 (VITAMIN K2) 100 MCG CAPS Take 200 mcg by mouth daily    Yes Reported, Patient   MULTIPLE VITAMINS PO Take 1 tablet by mouth daily   Yes Reported, Patient   order for DME Equipment being ordered: patellar stabilization knee brace with horseshoe, Large 7/16/20  Yes Edna Kelley MD   potassium chloride ER (KLOR-CON M) 20 MEQ CR tablet Take 2 tablets (40 mEq) by mouth daily 8/24/20  Yes Gonzalo Wahl MD   rifaximin (XIFAXAN) 550 MG TABS tablet Take 1 tablet (550 mg) by mouth 2 times daily 10/12/20  Yes Gonzalo Wahl MD   senna-docusate (SENOKOT-S/PERICOLACE) 8.6-50 MG tablet Take 1-2 tablets by mouth 2 times daily 6/11/20  Yes Pascual Valencia MD   sertraline (ZOLOFT) 50 MG tablet TAKE 1/2 TABLET BY MOUTH ONCE DAILY 6/8/20  Yes Gonzalo Wahl MD   sodium bicarbonate 650 MG tablet Take 1 tablet (650 mg) by mouth 2 times daily 8/10/20  Yes Alma Moses MD   spironolactone (ALDACTONE) 50 MG tablet Take 1 tablet (50 mg) by mouth daily 5/13/20  Yes Gonzalo Wahl MD   Thiamine HCl (B-1) 100 MG TABS Take 1 tablet by mouth daily 12/16/19  Yes Too Washington    vitamin D3 (CHOLECALCIFEROL) 2000 units (50 mcg) tablet Take 1 tablet by mouth daily   Yes Unknown, Entered By History  "      Allergies  Allergies   Allergen Reactions     Prednisone Visual Disturbance     Trazodone Visual Disturbance     Benadryl [Diphenhydramine] Other (See Comments)     Delirium (visual and auditory hallucinations)     Oxycodone Other (See Comments)     Delirium and constipation       Review of systems  A 10-point review of systems was negative    Examination  BP (!) 145/71   Pulse 80   Temp 98.1  F (36.7  C) (Oral)   Ht 1.778 m (5' 10\")   Wt 79.7 kg (175 lb 12.8 oz)   SpO2 100%   BMI 25.22 kg/m    Body mass index is 25.22 kg/m .    Gen- well, NAD, A+Ox3, normal color  CVS- RRR, no m/r/g, no added sounds  RS- good air entry bilaterally, no wheeze or crackles  Abd- non-distended, soft, non-tender, small reducible umbilical hernia, no obvious ascites or organomegaly on palpation or percussion, BS+  Extr- hands normal, no BESSY  Skin- no rash or jaundice  Neuro- no asterixis  Psych- normal mood    Laboratory  Lab Results   Component Value Date     11/02/2020    POTASSIUM 4.5 11/02/2020    CHLORIDE 117 11/02/2020    CO2 24 11/02/2020    BUN 17 11/02/2020    CR 1.22 11/02/2020       Lab Results   Component Value Date    BILITOTAL 2.2 11/02/2020    ALT 19 11/02/2020    AST 39 11/02/2020    ALKPHOS 136 11/02/2020       Lab Results   Component Value Date    ALBUMIN 2.2 11/02/2020    PROTTOTAL 5.4 11/02/2020        Lab Results   Component Value Date    WBC 3.2 11/02/2020    HGB 10.9 11/02/2020     11/02/2020    PLT 58 11/02/2020       Lab Results   Component Value Date    INR 1.77 11/02/2020       Radiology  Liver TIPS U/S Aug 2020 reviewed    Assessment  55-year-old male who presents for followup of decompensated alcoholic cirrhosis complicated with ascites, hepatic encephalopathy and variceal bleeding.  MELD-Na= 18 (improved).  Last ETOH= 02/2019.  MELD score is likely related to serum creatinine related to increase dose of diuretics.  Will obtain follow-up ultrasound to evaluate for resolution of " ascites and to evaluate for TIPS occlusion.  The patient will need a patent TIPS in order to undergo right partial knee replacement as planned.  Hepatic encephalopathy is well-controlled on current medications.  It will be of the utmost importance for the patient to have regular bowel movements in the post-operative period, particularly given his issues in the past with delirium (usually related to medications).  Up to date with HCC screening.      We discussed that patients with cirrhosis are at a higher risk for complications after surgery, including delayed healing and post-operative infections and the risks of acute changes in portal pressures related to induction of anesthesia that can occur in patients with cirrhosis, for which it will be important to ensure a functioning, patent TIPS prior to surgery.  We also discussed the possibility of liver transplant evaluation if the patient decompensates after surgery.  The patient has not completed chemical dependency assessment as advised.    Plan  1.  Continue diuretics at current doses for now  2.  Liver TIPS doppler U/S  3.  Consider TIPS revision prior to surgery  4.  Low Na diet  5.  Continue lactulose and rifaximin  6.  Aggressive bowel regimen following surgery  7.  Follow-up in 3 months    Gonzalo Bales MD  Hepatology  [facility]    Addendum 11/3/2020- liver TIPS doppler reviewed- evidence of ascites and abnormal shunt velocities.  Will need TIPS revision prior to surgery.

## 2020-11-02 NOTE — LETTER
11/2/2020         RE: Ivan Bell  [address] [facility] [address]        Dear Colleague,    Thank you for referring your patient, Ivan Bell, to the [facility] [place]. Please see a copy of my visit note below.    [facility]    Hepatology follow-up    Follow-up visit for cirrhosis    Subjective:  55 year old male    Cirrhosis  - ETOH  - hx ascites, TIPS placement 5/14/18, 6/6/18; TIPS revision 10/29/19  - hx HE  - hx variceal bleed Oct 2017  - ETOH hepatitis March 2019  - last EGD Jun 2019- dimin EV, mild portal hypertensive gastropathy  - HCC screening- liver TIPS doppler U/S Aug 2020    The patient comes to clinic this morning with his wife for follow-up of cirrhosis.  Last visit 05/2020.  The patient underwent trigger finger release under MAC in 06/2020.  He was noted to have ascites and abnormal TIPS velocities on ultrasound in 08/2020, which resulted in an increase of his furosemide dose.  More recently, the patient has been having issues with his right knee and will require a partial knee replacement.  No other medication changes, no ER visits or hospital admissions since last clinic visit.      The patient is well today.  He reports that he did have some mild abdominal distention in 08/2020 when he had his TIPS and was noted to have ascites.  He feels that this has improved since increasing the doses of his diuretics.  He denies any lower extremity edema.      The patient reports that he has been having issues with his right knee for several years.  This has significantly worsened over the past couple of months.  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  His anticipated surgery would be performed at [facility] and would involve 10 weeks of intensive physical therapy in the post-operative period.      The patient  denies any lethargy or confusion.  He is taking lactulose and rifaximin as prescribed and is moving his bowels 3-4 times per day.      The patient denies jaundice, melena, hematemesis or hematochezia.      The patient denies fevers, sweats or chills.  No cough or dyspnea.      Weight has been stable.  Appetite is good.      The patient has not drank any alcohol since 02/2019.  He wears a mask when out in public and is practicing social distancing.  He quit chewing tobacco 2 weeks ago.       Medical hx Surgical hx   Past Medical History:   Diagnosis Date     Alcoholic cirrhosis (H)      Alcoholic hepatitis 03/2019     Gout      Hypertension      Hypertriglyceridemia      Left calcaneus fracture 1/9/2006 January 16, 2006: Fell 10 feet from ladder onto left foot on frozen ground on 1/9/06 at home.  Immediate pain and unable to walk- seen at [place] and diagnosed with calcaneus fracture     Portal vein thrombosis     left occlusion, partial main      Past Surgical History:   Procedure Laterality Date     ANKLE SURGERY Left      COLONOSCOPY N/A 3/31/2016    Procedure: COLONOSCOPY;  Surgeon: Rhys Uriostegui MD;  Location:  GI     ESOPHAGOSCOPY, GASTROSCOPY, DUODENOSCOPY (EGD), COMBINED N/A 3/31/2016    Procedure: COMBINED ESOPHAGOSCOPY, GASTROSCOPY, DUODENOSCOPY (EGD);  Surgeon: Rhys Uriostegui MD;  Location:  GI     ESOPHAGOSCOPY, GASTROSCOPY, DUODENOSCOPY (EGD), COMBINED N/A 3/9/2018    Procedure: COMBINED ESOPHAGOSCOPY, GASTROSCOPY, DUODENOSCOPY (EGD), BIOPSY SINGLE OR MULTIPLE;  EGD;  Surgeon: Gonzalo Wahl MD;  Location:  GI     ESOPHAGOSCOPY, GASTROSCOPY, DUODENOSCOPY (EGD), COMBINED N/A 6/7/2019    Procedure: ESOPHAGOGASTRODUODENOSCOPY (EGD);  Surgeon: Gonzalo Wahl MD;  Location:  GI     IR PARACENTESIS  10/29/2019     IR TRANSVEN INTRAHEPATIC PORTOSYST REV  10/29/2019     KNEE SURGERY Left      KNEE SURGERY Right      RELEASE TRIGGER FINGER Right  6/11/2020    Procedure: RELEASE, TRIGGER FINGER, right ring and long finger;  Surgeon: Pascual Valencia MD;  Location: [facility] OR     SIGMOIDOSCOPY FLEXIBLE N/A 10/31/2017    Procedure: SIGMOIDOSCOPY FLEXIBLE;;  Surgeon: Armaan Adams MD;  Location: [facility] GI     TIPS Procedure  06/06/2018          Medications  Prior to Admission medications    Medication Sig Start Date End Date Taking? Authorizing Provider   acetaminophen (TYLENOL) 500 MG tablet Take 1,000 mg by mouth every 6 hours as needed for mild pain   Yes Reported, Patient   Ascorbic Acid (VITAMIN C PO) Take by mouth daily   Yes Reported, Patient   fexofenadine (ALLEGRA) 60 MG tablet Take 1 tablet (60 mg) by mouth daily 4/17/20  Yes Citlali Morgan PA-C   furosemide (LASIX) 20 MG tablet Take 2 tablets (40 mg) by mouth daily 8/24/20  Yes Gonzalo Wahl MD   lactulose (CONSTULOSE) 10 GM/15ML solution Take 30 mLs (20 g) by mouth daily 6/5/20  Yes Citlali Morgan PA-C   Menaquinone-7 (VITAMIN K2) 100 MCG CAPS Take 200 mcg by mouth daily    Yes Reported, Patient   MULTIPLE VITAMINS PO Take 1 tablet by mouth daily   Yes Reported, Patient   order for DME Equipment being ordered: patellar stabilization knee brace with horseshoe, Large 7/16/20  Yes Edna Kelley MD   potassium chloride ER (KLOR-CON M) 20 MEQ CR tablet Take 2 tablets (40 mEq) by mouth daily 8/24/20  Yes Gonzalo Wahl MD   rifaximin (XIFAXAN) 550 MG TABS tablet Take 1 tablet (550 mg) by mouth 2 times daily 10/12/20  Yes Gonzalo Wahl MD   senna-docusate (SENOKOT-S/PERICOLACE) 8.6-50 MG tablet Take 1-2 tablets by mouth 2 times daily 6/11/20  Yes Pascual Valencia MD   sertraline (ZOLOFT) 50 MG tablet TAKE 1/2 TABLET BY MOUTH ONCE DAILY 6/8/20  Yes Gonzalo Wahl MD   sodium bicarbonate 650 MG tablet Take 1 tablet (650 mg) by mouth 2 times daily 8/10/20  Yes Alma Moses MD   spironolactone (ALDACTONE) 50 MG tablet Take 1 tablet (50 mg) by  "mouth daily 5/13/20  Yes Gonzalo Wahl MD   Thiamine HCl (B-1) 100 MG TABS Take 1 tablet by mouth daily 12/16/19  Yes Too Washington DO   vitamin D3 (CHOLECALCIFEROL) 2000 units (50 mcg) tablet Take 1 tablet by mouth daily   Yes Unknown, Entered By History       Allergies  Allergies   Allergen Reactions     Prednisone Visual Disturbance     Trazodone Visual Disturbance     Benadryl [Diphenhydramine] Other (See Comments)     Delirium (visual and auditory hallucinations)     Oxycodone Other (See Comments)     Delirium and constipation       Review of systems  A 10-point review of systems was negative    Examination  BP (!) 145/71   Pulse 80   Temp 98.1  F (36.7  C) (Oral)   Ht 1.778 m (5' 10\")   Wt 79.7 kg (175 lb 12.8 oz)   SpO2 100%   BMI 25.22 kg/m    Body mass index is 25.22 kg/m .    Gen- well, NAD, A+Ox3, normal color  CVS- RRR, no m/r/g, no added sounds  RS- good air entry bilaterally, no wheeze or crackles  Abd- non-distended, soft, non-tender, small reducible umbilical hernia, no obvious ascites or organomegaly on palpation or percussion, BS+  Extr- hands normal, no BESSY  Skin- no rash or jaundice  Neuro- no asterixis  Psych- normal mood    Laboratory  Lab Results   Component Value Date     11/02/2020    POTASSIUM 4.5 11/02/2020    CHLORIDE 117 11/02/2020    CO2 24 11/02/2020    BUN 17 11/02/2020    CR 1.22 11/02/2020       Lab Results   Component Value Date    BILITOTAL 2.2 11/02/2020    ALT 19 11/02/2020    AST 39 11/02/2020    ALKPHOS 136 11/02/2020       Lab Results   Component Value Date    ALBUMIN 2.2 11/02/2020    PROTTOTAL 5.4 11/02/2020        Lab Results   Component Value Date    WBC 3.2 11/02/2020    HGB 10.9 11/02/2020     11/02/2020    PLT 58 11/02/2020       Lab Results   Component Value Date    INR 1.77 11/02/2020       Radiology  Liver TIPS U/S Aug 2020 reviewed    Assessment  55-year-old male who presents for followup of decompensated alcoholic cirrhosis " complicated with ascites, hepatic encephalopathy and variceal bleeding.  MELD-Na= 18 (improved).  Last ETOH= 02/2019.  MELD score is likely related to serum creatinine related to increase dose of diuretics.  Will obtain follow-up ultrasound to evaluate for resolution of ascites and to evaluate for TIPS occlusion.  The patient will need a patent TIPS in order to undergo right partial knee replacement as planned.  Hepatic encephalopathy is well-controlled on current medications.  It will be of the utmost importance for the patient to have regular bowel movements in the post-operative period, particularly given his issues in the past with delirium (usually related to medications).  Up to date with HCC screening.      We discussed that patients with cirrhosis are at a higher risk for complications after surgery, including delayed healing and post-operative infections and the risks of acute changes in portal pressures related to induction of anesthesia that can occur in patients with cirrhosis, for which it will be important to ensure a functioning, patent TIPS prior to surgery.  We also discussed the possibility of liver transplant evaluation if the patient decompensates after surgery.  The patient has not completed chemical dependency assessment as advised.    Plan  1.  Continue diuretics at current doses for now  2.  Liver TIPS doppler U/S  3.  Consider TIPS revision prior to surgery  4.  Low Na diet  5.  Continue lactulose and rifaximin  6.  Aggressive bowel regimen following surgery  7.  Follow-up in 3 months    Gonzalo Bales MD  Hepatology  [facility]    Addendum 11/3/2020- liver TIPS doppler reviewed- evidence of ascites and abnormal shunt velocities.  Will need TIPS revision prior to surgery.      Again, thank you for allowing me to participate in the care of your patient.        Sincerely,        Gonzalo Bales MD

## 2020-11-03 ENCOUNTER — ANCILLARY PROCEDURE (OUTPATIENT)
Dept: ULTRASOUND IMAGING | Facility: CLINIC | Age: 55
End: 2020-11-03
Attending: INTERNAL MEDICINE
Payer: COMMERCIAL

## 2020-11-03 DIAGNOSIS — K70.31 ALCOHOLIC CIRRHOSIS OF LIVER WITH ASCITES (H): ICD-10-CM

## 2020-11-03 DIAGNOSIS — Z95.828 S/P TIPS (TRANSJUGULAR INTRAHEPATIC PORTOSYSTEMIC SHUNT): ICD-10-CM

## 2020-11-03 PROCEDURE — 93975 VASCULAR STUDY: CPT | Performed by: RADIOLOGY

## 2020-11-04 ENCOUNTER — TELEPHONE (OUTPATIENT)
Dept: VASCULAR SURGERY | Facility: CLINIC | Age: 55
End: 2020-11-04

## 2020-11-04 ENCOUNTER — PATIENT OUTREACH (OUTPATIENT)
Dept: GASTROENTEROLOGY | Facility: CLINIC | Age: 55
End: 2020-11-04

## 2020-11-04 NOTE — TELEPHONE ENCOUNTER
Called and left a msg for pt regarding f/up on a referral that Dr Bales would like for him to come back and see Dr. Tristan regarding a TIPS revision     Informed him that we do have him scheduled for this consult appt on MOn 11/16 at 440pm     This is a tele visit and explained that Dr. Tristan will be calling him around this time     Asked that he return my call to confirm my msg.     Denise LAZARO RN, BSN  Interventional Radiology/Vascular  Nurse Coordinator   Phone: [phone]  Fax: [phone]

## 2020-11-04 NOTE — RESULT ENCOUNTER NOTE
Left message with patient and spoke to patient's wife Elicia reviewing the results. She verbalized understanding.

## 2020-11-05 NOTE — PROGRESS NOTES
Unable to reach patient, but did reach wife Elicia and discussed findings and recommendations per Dr. Bales for TIPS revision in IR. Patient able to speak later in the day and further discussed the plan with him. He did receive a message from IR and will return that call.

## 2020-11-14 ENCOUNTER — HEALTH MAINTENANCE LETTER (OUTPATIENT)
Age: 55
End: 2020-11-14

## 2020-11-16 ENCOUNTER — VIRTUAL VISIT (OUTPATIENT)
Dept: RADIOLOGY | Facility: CLINIC | Age: 55
End: 2020-11-16
Attending: RADIOLOGY
Payer: COMMERCIAL

## 2020-11-16 DIAGNOSIS — K70.31 ALCOHOLIC CIRRHOSIS OF LIVER WITH ASCITES (H): Primary | ICD-10-CM

## 2020-11-16 PROCEDURE — 99212 OFFICE O/P EST SF 10 MIN: CPT | Performed by: RADIOLOGY

## 2020-11-16 PROCEDURE — 999N001193 HC VIDEO/TELEPHONE VISIT; NO CHARGE

## 2020-11-16 NOTE — PROGRESS NOTES
"Ivan Bell is a 55 year old male who is being evaluated via a billable telephone visit.      The patient has been notified of following:     \"This telephone visit will be conducted via a call between you and your physician/provider. We have found that certain health care needs can be provided without the need for a physical exam.  This service lets us provide the care you need with a short phone conversation.  If a prescription is necessary we can send it directly to your pharmacy.  If lab work is needed we can place an order for that and you can then stop by our lab to have the test done at a later time.    Telephone visits are billed at different rates depending on your insurance coverage. During this emergency period, for some insurers they may be billed the same as an in-person visit.  Please reach out to your insurance provider with any questions.    If during the course of the call the physician/provider feels a telephone visit is not appropriate, you will not be charged for this service.\"    Patient has given verbal consent for Telephone visit?  Yes    What phone number would you like to be contacted at? [phone]    How would you like to obtain your AVS? Frances    Vitals - Patient Reported  Weight (Patient Reported): 74.8 kg (165 lb)  Pain Score: No Pain (0)        I have reviewed and updated patient's allergy and medication list.    Concerns: None  Refills: None      Marilee Alaniz [facility]    Phone call duration: 10 minutes    Jelani Tristan MD    S:  Mr. 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  Unfortunately, he had a EtOH relapse in 2/2019, but has again been sober since.   He underwent a TIPS revision on 10/29/2019 for recurrent ascites, and hasn't needed a paracentesis since.  However, he does report he feels he's beginning to reaccumulate fluid.  He has no complaints otherwise and denies HE, jaundice, or " GI bleeds.    O    Lab Results   Component Value Date    WBC 3.2 11/02/2020     Lab Results   Component Value Date    RBC 3.38 11/02/2020     Lab Results   Component Value Date    HGB 10.9 11/02/2020     Lab Results   Component Value Date    HCT 33.8 11/02/2020     Lab Results   Component Value Date     11/02/2020     Lab Results   Component Value Date    MCH 32.2 11/02/2020     Lab Results   Component Value Date    MCHC 32.2 11/02/2020     Lab Results   Component Value Date    RDW 17.2 11/02/2020     Lab Results   Component Value Date    PLT 58 11/02/2020     Last Comprehensive Metabolic Panel:  Sodium   Date Value Ref Range Status   11/02/2020 144 133 - 144 mmol/L Final     Potassium   Date Value Ref Range Status   11/02/2020 4.5 3.4 - 5.3 mmol/L Final     Chloride   Date Value Ref Range Status   11/02/2020 117 (H) 94 - 109 mmol/L Final     Carbon Dioxide   Date Value Ref Range Status   11/02/2020 24 20 - 32 mmol/L Final     Anion Gap   Date Value Ref Range Status   11/02/2020 4 3 - 14 mmol/L Final     Glucose   Date Value Ref Range Status   11/02/2020 213 (H) 70 - 99 mg/dL Final     Urea Nitrogen   Date Value Ref Range Status   11/02/2020 17 7 - 30 mg/dL Final     Creatinine   Date Value Ref Range Status   11/02/2020 1.22 0.66 - 1.25 mg/dL Final     GFR Estimate   Date Value Ref Range Status   11/02/2020 65 >60 mL/min/[1.73_m2] Final     Comment:     Non  GFR Calc  Starting 12/18/2018, serum creatinine based estimated GFR (eGFR) will be   calculated using the Chronic Kidney Disease Epidemiology Collaboration   (CKD-EPI) equation.       Calcium   Date Value Ref Range Status   11/02/2020 8.4 (L) 8.5 - 10.1 mg/dL Final     Bilirubin Total   Date Value Ref Range Status   11/02/2020 2.2 (H) 0.2 - 1.3 mg/dL Final     Alkaline Phosphatase   Date Value Ref Range Status   11/02/2020 136 40 - 150 U/L Final     ALT   Date Value Ref Range Status   11/02/2020 19 0 - 70 U/L Final     AST   Date Value Ref  Range Status   11/02/2020 39 0 - 45 U/L Final     US: I reviewed the US from 11/17 which demonstrated some elevated velocities which have increased since prior.    A/P: Mr. Bell has had a nice response to his TIPS for refractory ascites, but now feels his fluid is reaccumulating.  In addition his US demonstrates increased velocities, therefore we will bring him in for a TIPS revision.

## 2020-11-16 NOTE — LETTER
"    11/16/2020         RE: Ivan Bell  [address] [address] [facility] [address] [facility] [address]        Dear Colleague,    Thank you for referring your patient, Ivan Bell, to the [facility]. Please see a copy of my visit note below.    Ivan Bell is a 55 year old male who is being evaluated via a billable telephone visit.      The patient has been notified of following:     \"This telephone visit will be conducted via a call between you and your physician/provider. We have found that certain health care needs can be provided without the need for a physical exam.  This service lets us provide the care you need with a short phone conversation.  If a prescription is necessary we can send it directly to your pharmacy.  If lab work is needed we can place an order for that and you can then stop by our lab to have the test done at a later time.    Telephone visits are billed at different rates depending on your insurance coverage. During this emergency period, for some insurers they may be billed the same as an in-person visit.  Please reach out to your insurance provider with any questions.    If during the course of the call the physician/provider feels a telephone visit is not appropriate, you will not be charged for this service.\"    Patient has given verbal consent for Telephone visit?  Yes    What phone number would you like to be contacted at? [phone]    How would you like to obtain your AVS? Frances    Vitals - Patient Reported  Weight (Patient Reported): 74.8 kg (165 lb)  Pain Score: No Pain (0)        I have reviewed and updated patient's allergy and medication list.    Concerns: None  Refills: None      Marilee Alaniz [facility]    Phone call duration: 10 minutes    Jelani Tristan MD    S:  Mr. 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  Unfortunately, he had a EtOH relapse in " 2/2019, but has again been sober since.   He underwent a TIPS revision on 10/29/2019 for recurrent ascites, and hasn't needed a paracentesis since.  However, he does report he feels he's beginning to reaccumulate fluid.  He has no complaints otherwise and denies HE, jaundice, or GI bleeds.    O    Lab Results   Component Value Date    WBC 3.2 11/02/2020     Lab Results   Component Value Date    RBC 3.38 11/02/2020     Lab Results   Component Value Date    HGB 10.9 11/02/2020     Lab Results   Component Value Date    HCT 33.8 11/02/2020     Lab Results   Component Value Date     11/02/2020     Lab Results   Component Value Date    MCH 32.2 11/02/2020     Lab Results   Component Value Date    MCHC 32.2 11/02/2020     Lab Results   Component Value Date    RDW 17.2 11/02/2020     Lab Results   Component Value Date    PLT 58 11/02/2020     Last Comprehensive Metabolic Panel:  Sodium   Date Value Ref Range Status   11/02/2020 144 133 - 144 mmol/L Final     Potassium   Date Value Ref Range Status   11/02/2020 4.5 3.4 - 5.3 mmol/L Final     Chloride   Date Value Ref Range Status   11/02/2020 117 (H) 94 - 109 mmol/L Final     Carbon Dioxide   Date Value Ref Range Status   11/02/2020 24 20 - 32 mmol/L Final     Anion Gap   Date Value Ref Range Status   11/02/2020 4 3 - 14 mmol/L Final     Glucose   Date Value Ref Range Status   11/02/2020 213 (H) 70 - 99 mg/dL Final     Urea Nitrogen   Date Value Ref Range Status   11/02/2020 17 7 - 30 mg/dL Final     Creatinine   Date Value Ref Range Status   11/02/2020 1.22 0.66 - 1.25 mg/dL Final     GFR Estimate   Date Value Ref Range Status   11/02/2020 65 >60 mL/min/[1.73_m2] Final     Comment:     Non  GFR Calc  Starting 12/18/2018, serum creatinine based estimated GFR (eGFR) will be   calculated using the Chronic Kidney Disease Epidemiology Collaboration   (CKD-EPI) equation.       Calcium   Date Value Ref Range Status   11/02/2020 8.4 (L) 8.5 - 10.1 mg/dL Final      Bilirubin Total   Date Value Ref Range Status   11/02/2020 2.2 (H) 0.2 - 1.3 mg/dL Final     Alkaline Phosphatase   Date Value Ref Range Status   11/02/2020 136 40 - 150 U/L Final     ALT   Date Value Ref Range Status   11/02/2020 19 0 - 70 U/L Final     AST   Date Value Ref Range Status   11/02/2020 39 0 - 45 U/L Final     US: I reviewed the US from 11/17 which demonstrated some elevated velocities which have increased since prior.    A/P: Mr. Bell has had a nice response to his TIPS for refractory ascites, but now feels his fluid is reaccumulating.  In addition his US demonstrates increased velocities, therefore we will bring him in for a TIPS revision.            Again, thank you for allowing me to participate in the care of your patient.        Sincerely,        Jelani Tristan MD

## 2020-11-17 ENCOUNTER — TELEPHONE (OUTPATIENT)
Dept: VASCULAR SURGERY | Facility: CLINIC | Age: 55
End: 2020-11-17

## 2020-11-17 DIAGNOSIS — K70.31 ALCOHOLIC CIRRHOSIS OF LIVER WITH ASCITES (H): Primary | ICD-10-CM

## 2020-11-17 DIAGNOSIS — Z11.59 ENCOUNTER FOR SCREENING FOR OTHER VIRAL DISEASES: Primary | ICD-10-CM

## 2020-11-17 NOTE — TELEPHONE ENCOUNTER
Called pt on cell    Informed him that we have him scheduled for his tips revision with Dr. Tristan on Thurs 12/10.     He is to check into [facility] at 830am for a 10am start time    Informed him that he doesn't need a preop however we are asking that he have a covid test 4 days prior to.     Informed him that there will be a scheduling team who will reach out to him.     He verbalized understanding and will also send a letter as well.     Denise LAZARO RN, BSN  Interventional Radiology/Vascular  Nurse Coordinator   Phone: [phone]  Fax: [phone]

## 2020-11-23 ENCOUNTER — TELEPHONE (OUTPATIENT)
Dept: INTERVENTIONAL RADIOLOGY/VASCULAR | Facility: CLINIC | Age: 55
End: 2020-11-23

## 2020-11-23 ENCOUNTER — AMBULATORY - HEALTHEAST (OUTPATIENT)
Dept: LAB | Facility: CLINIC | Age: 55
End: 2020-11-23

## 2020-11-23 ENCOUNTER — TELEPHONE (OUTPATIENT)
Dept: VASCULAR SURGERY | Facility: CLINIC | Age: 55
End: 2020-11-23

## 2020-11-23 ENCOUNTER — AMBULATORY - HEALTHEAST (OUTPATIENT)
Dept: FAMILY MEDICINE | Facility: CLINIC | Age: 55
End: 2020-11-23

## 2020-11-23 DIAGNOSIS — Z11.59 ENCOUNTER FOR SCREENING FOR OTHER VIRAL DISEASES: ICD-10-CM

## 2020-11-23 NOTE — TELEPHONE ENCOUNTER
Called pt and informed him that I do have a cancellation for Dr. Tristan on WEds 11/25.     Pt is interested in moving his procedure up.    *pt scheduled for covid today at 230pm at [facility].     All appt details given to him    Also that his procedure is now on Weds 11/25     930am check into [facility] waiting room    All appt details and location given to him.     Asked him to return my call to confirm my VM    Denise LAZARO RN, BSN  Interventional Radiology/Vascular  Nurse Coordinator   Phone: [phone]  Fax: [phone]

## 2020-11-24 LAB
SARS-COV-2 PCR COMMENT: NORMAL
SARS-COV-2 RNA SPEC QL NAA+PROBE: NEGATIVE
SARS-COV-2 VIRUS SPECIMEN SOURCE: NORMAL

## 2020-11-25 ENCOUNTER — HOSPITAL ENCOUNTER (OUTPATIENT)
Facility: CLINIC | Age: 55
Discharge: HOME | End: 2020-11-25
Attending: RADIOLOGY | Admitting: RADIOLOGY
Payer: COMMERCIAL

## 2020-11-25 ENCOUNTER — APPOINTMENT (OUTPATIENT)
Dept: MEDSURG UNIT | Facility: CLINIC | Age: 55
End: 2020-11-25
Attending: RADIOLOGY
Payer: COMMERCIAL

## 2020-11-25 ENCOUNTER — APPOINTMENT (OUTPATIENT)
Dept: INTERVENTIONAL RADIOLOGY/VASCULAR | Facility: CLINIC | Age: 55
End: 2020-11-25
Attending: RADIOLOGY
Payer: COMMERCIAL

## 2020-11-25 ENCOUNTER — COMMUNICATION - HEALTHEAST (OUTPATIENT)
Dept: SCHEDULING | Facility: CLINIC | Age: 55
End: 2020-11-25

## 2020-11-25 VITALS
BODY MASS INDEX: 24.39 KG/M2 | WEIGHT: 170 LBS | OXYGEN SATURATION: 98 % | SYSTOLIC BLOOD PRESSURE: 116 MMHG | HEART RATE: 73 BPM | TEMPERATURE: 98.1 F | RESPIRATION RATE: 16 BRPM | DIASTOLIC BLOOD PRESSURE: 66 MMHG

## 2020-11-25 DIAGNOSIS — K70.31 ALCOHOLIC CIRRHOSIS OF LIVER WITH ASCITES (H): ICD-10-CM

## 2020-11-25 PROCEDURE — 250N000011 HC RX IP 250 OP 636: Performed by: PHYSICIAN ASSISTANT

## 2020-11-25 PROCEDURE — 272N000504 HC NEEDLE CR4

## 2020-11-25 PROCEDURE — 255N000002 HC RX 255 OP 636: Performed by: RADIOLOGY

## 2020-11-25 PROCEDURE — 99153 MOD SED SAME PHYS/QHP EA: CPT

## 2020-11-25 PROCEDURE — 49083 ABD PARACENTESIS W/IMAGING: CPT | Mod: GC | Performed by: RADIOLOGY

## 2020-11-25 PROCEDURE — 272N000500 HC NEEDLE CR2

## 2020-11-25 PROCEDURE — 99152 MOD SED SAME PHYS/QHP 5/>YRS: CPT | Mod: GC | Performed by: RADIOLOGY

## 2020-11-25 PROCEDURE — C1769 GUIDE WIRE: HCPCS

## 2020-11-25 PROCEDURE — 37183 REVISION TIPS: CPT | Mod: GC | Performed by: RADIOLOGY

## 2020-11-25 PROCEDURE — C1725 CATH, TRANSLUMIN NON-LASER: HCPCS

## 2020-11-25 PROCEDURE — 250N000013 HC RX MED GY IP 250 OP 250 PS 637: Performed by: STUDENT IN AN ORGANIZED HEALTH CARE EDUCATION/TRAINING PROGRAM

## 2020-11-25 PROCEDURE — 999N000127 HC STATISTIC PERIPHERAL IV START W US GUIDANCE

## 2020-11-25 PROCEDURE — 37183 REVISION TIPS: CPT

## 2020-11-25 PROCEDURE — 272N000570 HC SHEATH CR7

## 2020-11-25 PROCEDURE — C1887 CATHETER, GUIDING: HCPCS

## 2020-11-25 PROCEDURE — 99152 MOD SED SAME PHYS/QHP 5/>YRS: CPT

## 2020-11-25 PROCEDURE — 49083 ABD PARACENTESIS W/IMAGING: CPT

## 2020-11-25 PROCEDURE — 250N000009 HC RX 250: Performed by: RADIOLOGY

## 2020-11-25 PROCEDURE — 272N000192 HC ACCESSORY CR2

## 2020-11-25 PROCEDURE — 272N000302 HC DEVICE INFLATION CR5

## 2020-11-25 PROCEDURE — 999N000133 HC STATISTIC PP CARE STAGE 2

## 2020-11-25 PROCEDURE — 250N000011 HC RX IP 250 OP 636: Performed by: RADIOLOGY

## 2020-11-25 RX ORDER — HEPARIN SODIUM 200 [USP'U]/100ML
1 INJECTION, SOLUTION INTRAVENOUS CONTINUOUS PRN
Status: DISCONTINUED | OUTPATIENT
Start: 2020-11-25 | End: 2020-11-25 | Stop reason: HOSPADM

## 2020-11-25 RX ORDER — IODIXANOL 320 MG/ML
100 INJECTION, SOLUTION INTRAVASCULAR ONCE
Status: COMPLETED | OUTPATIENT
Start: 2020-11-25 | End: 2020-11-25

## 2020-11-25 RX ORDER — SODIUM CHLORIDE 9 MG/ML
INJECTION, SOLUTION INTRAVENOUS CONTINUOUS
Status: DISCONTINUED | OUTPATIENT
Start: 2020-11-25 | End: 2020-11-25 | Stop reason: HOSPADM

## 2020-11-25 RX ORDER — NICOTINE POLACRILEX 4 MG
15-30 LOZENGE BUCCAL
Status: DISCONTINUED | OUTPATIENT
Start: 2020-11-25 | End: 2020-11-25 | Stop reason: HOSPADM

## 2020-11-25 RX ORDER — DEXTROSE MONOHYDRATE 25 G/50ML
25-50 INJECTION, SOLUTION INTRAVENOUS
Status: DISCONTINUED | OUTPATIENT
Start: 2020-11-25 | End: 2020-11-25 | Stop reason: HOSPADM

## 2020-11-25 RX ORDER — LIDOCAINE 40 MG/G
CREAM TOPICAL
Status: DISCONTINUED | OUTPATIENT
Start: 2020-11-25 | End: 2020-11-25 | Stop reason: HOSPADM

## 2020-11-25 RX ORDER — IODIXANOL 320 MG/ML
100 INJECTION, SOLUTION INTRAVASCULAR ONCE
Status: DISCONTINUED | OUTPATIENT
Start: 2020-11-25 | End: 2020-11-25 | Stop reason: HOSPADM

## 2020-11-25 RX ORDER — FENTANYL CITRATE 50 UG/ML
25-50 INJECTION, SOLUTION INTRAMUSCULAR; INTRAVENOUS EVERY 5 MIN PRN
Status: DISCONTINUED | OUTPATIENT
Start: 2020-11-25 | End: 2020-11-25 | Stop reason: HOSPADM

## 2020-11-25 RX ORDER — NALOXONE HYDROCHLORIDE 0.4 MG/ML
.1-.4 INJECTION, SOLUTION INTRAMUSCULAR; INTRAVENOUS; SUBCUTANEOUS
Status: DISCONTINUED | OUTPATIENT
Start: 2020-11-25 | End: 2020-11-25 | Stop reason: HOSPADM

## 2020-11-25 RX ORDER — ACETAMINOPHEN 500 MG
500 TABLET ORAL EVERY 6 HOURS PRN
Status: DISCONTINUED | OUTPATIENT
Start: 2020-11-25 | End: 2020-11-25 | Stop reason: HOSPADM

## 2020-11-25 RX ORDER — AMPICILLIN AND SULBACTAM 2; 1 G/1; G/1
3 INJECTION, POWDER, FOR SOLUTION INTRAMUSCULAR; INTRAVENOUS
Status: COMPLETED | OUTPATIENT
Start: 2020-11-25 | End: 2020-11-25

## 2020-11-25 RX ORDER — FLUMAZENIL 0.1 MG/ML
0.2 INJECTION, SOLUTION INTRAVENOUS
Status: DISCONTINUED | OUTPATIENT
Start: 2020-11-25 | End: 2020-11-25 | Stop reason: HOSPADM

## 2020-11-25 RX ORDER — IBUPROFEN 200 MG
600 TABLET ORAL EVERY 6 HOURS PRN
Status: DISCONTINUED | OUTPATIENT
Start: 2020-11-25 | End: 2020-11-25 | Stop reason: HOSPADM

## 2020-11-25 RX ADMIN — IODIXANOL 15 ML: 320 INJECTION, SOLUTION INTRAVASCULAR at 10:30

## 2020-11-25 RX ADMIN — HEPARIN SODIUM 2 BAG: 200 INJECTION, SOLUTION INTRAVENOUS at 10:48

## 2020-11-25 RX ADMIN — FENTANYL CITRATE 50 MCG: 50 INJECTION, SOLUTION INTRAMUSCULAR; INTRAVENOUS at 10:52

## 2020-11-25 RX ADMIN — MIDAZOLAM 1 MG: 1 INJECTION INTRAMUSCULAR; INTRAVENOUS at 10:36

## 2020-11-25 RX ADMIN — MIDAZOLAM 1 MG: 1 INJECTION INTRAMUSCULAR; INTRAVENOUS at 10:52

## 2020-11-25 RX ADMIN — MIDAZOLAM 1 MG: 1 INJECTION INTRAMUSCULAR; INTRAVENOUS at 10:31

## 2020-11-25 RX ADMIN — FENTANYL CITRATE 50 MCG: 50 INJECTION, SOLUTION INTRAMUSCULAR; INTRAVENOUS at 10:37

## 2020-11-25 RX ADMIN — IBUPROFEN 600 MG: 200 TABLET, FILM COATED ORAL at 12:33

## 2020-11-25 RX ADMIN — FENTANYL CITRATE 50 MCG: 50 INJECTION, SOLUTION INTRAMUSCULAR; INTRAVENOUS at 10:31

## 2020-11-25 RX ADMIN — LIDOCAINE HYDROCHLORIDE 6 ML: 10 INJECTION, SOLUTION EPIDURAL; INFILTRATION; INTRACAUDAL; PERINEURAL at 10:35

## 2020-11-25 RX ADMIN — AMPICILLIN SODIUM AND SULBACTAM SODIUM 3 G: 2; 1 INJECTION, POWDER, FOR SOLUTION INTRAMUSCULAR; INTRAVENOUS at 10:19

## 2020-11-25 NOTE — PROCEDURES
[facility]     Procedure: IR Procedure Note    Date/Time: 11/25/2020 11:16 AM  Performed by: Rickie Simon MD  Authorized by: Rickie Simon MD     UNIVERSAL PROTOCOL   Site Marked: NA  Prior Images Obtained and Reviewed:  Yes  Required items: Required blood products, implants, devices and special equipment available    Patient identity confirmed:  Verbally with patient, arm band, provided demographic data and hospital-assigned identification number  Patient was reevaluated immediately before administering moderate or deep sedation or anesthesia  Confirmation Checklist:  Patient's identity using two indicators, relevant allergies, procedure was appropriate and matched the consent or emergent situation and correct equipment/implants were available  Time out: Immediately prior to the procedure a time out was called    Universal Protocol: the Joint Commission Universal Protocol was followed    Preparation: Patient was prepped and draped in usual sterile fashion           ANESTHESIA    Anesthesia: Local infiltration  Local Anesthetic:  Lidocaine 1% without epinephrine      SEDATION    Patient Sedated: Yes    Sedation Type:  Moderate (conscious) sedation  Sedation:  Fentanyl and midazolam  Vital signs: Vital signs monitored during sedation    See dictated procedure note for full details.  Findings: Gradient 19, plasty to 10 mm, reduced gradient to 12    Specimens: none    Complications: None    Condition: Stable    Plan: 1 hr bed rest    PROCEDURE   Patient Tolerance:  Patient tolerated the procedure well with no immediate complications    Length of time physician/provider present for 1:1 monitoring during sedation: 30

## 2020-11-25 NOTE — PROGRESS NOTES
Patient Name: Ivan Bell  Medical Record Number: [id number]  Today's Date: 11/25/2020    Procedure: transvenous intrahepatic portosystemic shunt revision, paracentesis  Proceduralist: Dr. Jelani Tristan, Dr. Rickie Simon.    Patient in room:1023  Procedure Start: 1033  Procedure end: 1101  Sedation medications administered: 3 mg versed, 150 mcg fentanyl.  Patient out of room: 1106    Report given to: 2DARIO Rosenbaum.    Other Notes: Pt arrived to IR room 1 from 2.A.. Consent reviewed. Pt denies any questions or concerns regarding procedure. Pt positioned supine and monitored per protocol. Pt tolerated procedure without any noted complications. Pt transferred back to 2.A.    Paracenteis fluid removed: 1200cc.

## 2020-11-25 NOTE — IP AVS SNAPSHOT
Formerly [facility] [address] [address]                                    After Visit Summary   11/25/2020    Ivan Bell    MRN: [id number]           After Visit Summary Signature Page    I have received my discharge instructions, and my questions have been answered. I have discussed any challenges I see with this plan with the nurse or doctor.    ..........................................................................................................................................  Patient/Patient Representative Signature      ..........................................................................................................................................  Patient Representative Print Name and Relationship to Patient    ..................................................               ................................................  Date                                   Time    ..........................................................................................................................................  Reviewed by Signature/Title    ...................................................              ..............................................  Date                                               Time          22EPIC Rev 08/18

## 2020-11-25 NOTE — PROGRESS NOTES
11/25/20: TIPS revision  GONZALO Tristan MD, and MAYITO Simon MD    Pre Pressures:  MPV- 23  PVES- 21  HVES- 21  RA- 4    Post 10mm PTA Pressures:  MPV- 20  PVES- 18  HVES- 18  RA- 8        GONZALO Davidson RT(R)(VI)

## 2020-11-25 NOTE — IP AVS SNAPSHOT
MRN:[id number]                      After Visit Summary   11/25/2020    Ivan Bell    MRN: [id number]           Visit Information        Department      11/25/2020  9:07 AM [facility] [address] [place]          Review of your medicines      UNREVIEWED medicines. Ask your doctor about these medicines       Dose / Directions   acetaminophen 500 MG tablet  Commonly known as: TYLENOL      Dose: 1,000 mg  Take 1,000 mg by mouth every 6 hours as needed for mild pain  Refills: 0     B-1 100 MG Tabs  Used for: Decompensation of cirrhosis of liver (H)      Dose: 1 tablet  Take 1 tablet by mouth daily  Quantity: 90 tablet  Refills: 3     Constulose 10 GM/15ML solution  Used for: Alcoholic cirrhosis of liver with ascites (H), Hepatic encephalopathy (H)  Generic drug: lactulose      Dose: 20 g  Take 30 mLs (20 g) by mouth daily  Refills: 0     fexofenadine 60 MG tablet  Commonly known as: ALLEGRA  Used for: Seasonal allergic rhinitis, unspecified trigger      Dose: 60 mg  Take 1 tablet (60 mg) by mouth daily  Quantity: 30 tablet  Refills: 1     furosemide 20 MG tablet  Commonly known as: LASIX  Used for: Alcoholic cirrhosis of liver with ascites (H)      Dose: 40 mg  Take 2 tablets (40 mg) by mouth daily  Quantity: 180 tablet  Refills: 3     MULTIPLE VITAMINS PO      Dose: 1 tablet  Take 1 tablet by mouth daily  Refills: 0     potassium chloride ER 20 MEQ CR tablet  Commonly known as: KLOR-CON M  Used for: Hypokalemia      Dose: 40 mEq  Take 2 tablets (40 mEq) by mouth daily  Quantity: 120 tablet  Refills: 3     rifaximin 550 MG Tabs tablet  Commonly known as: Xifaxan  Used for: Hepatic encephalopathy (H)      Dose: 550 mg  Take 1 tablet (550 mg) by mouth 2 times daily  Quantity: 180 tablet  Refills: 3     senna-docusate 8.6-50 MG tablet  Commonly known as: SENOKOT-S/PERICOLACE  Used for: Trigger finger, acquired      Dose: 1-2 tablet  Take 1-2 tablets by mouth 2 times daily  Quantity: 30  tablet  Refills: 0     sertraline 50 MG tablet  Commonly known as: ZOLOFT  Used for: Alcoholic cirrhosis of liver with ascites (H), Itching      TAKE 1/2 TABLET BY MOUTH ONCE DAILY  Quantity: 30 tablet  Refills: 3     sodium bicarbonate 650 MG tablet  Used for: Renal tubular acidosis      Dose: 650 mg  Take 1 tablet (650 mg) by mouth 2 times daily  Quantity: 180 tablet  Refills: 3     spironolactone 50 MG tablet  Commonly known as: Aldactone  Used for: Alcoholic cirrhosis of liver with ascites (H)      Dose: 50 mg  Take 1 tablet (50 mg) by mouth daily  Quantity: 90 tablet  Refills: 3     VITAMIN C PO      Take by mouth daily  Refills: 0     vitamin D3 50 mcg (2000 units) tablet  Commonly known as: CHOLECALCIFEROL      Dose: 1 tablet  Take 1 tablet by mouth daily  Refills: 0     Vitamin K2 100 MCG Caps      Dose: 200 mcg  Take 200 mcg by mouth daily  Refills: 0        CONTINUE these medicines which have NOT CHANGED       Dose / Directions   order for DME  Used for: Acute pain of right knee      Equipment being ordered: patellar stabilization knee brace with horseshoe, Large  Quantity: 1 Device  Refills: 0              Protect others around you: Learn how to safely use, store and throw away your medicines at www.disposemymeds.org.       Follow-ups after your visit       Your next 10 appointments already scheduled    Feb 01, 2021  8:30 AM  Telephone Visit with Gonzalo Miramontes MD  [facility] [place] ([facility]) [address] [place] [address] [phone]   [facility] [place]  Note: this is not an onsite visit; there is no need to come to the facility.  Please have a list of all current medications available for appointment.         Care Instructions       Further instructions from your care team       [facility]      Interventional Radiology  Patient Instructions Following  Paracentesis    AFTER YOU GO HOME:  ? If you were given sedation; we recommend that an adult stay with you for the first 24 hours.   No driving or alcoholic beverages for 24 hours.  ? Resume previous diet and medication  ? Limit strenuous physical activity such as lifting (>10 lbs.), straining, or exercising for 48 hours.  You may resume normal activity in 24 hours  ? Change gauze at the puncture site as needed to keep site dry.  Leaking of additional fluid is not uncommon during the first 24-48 hours.    CALL 911:  ? If this is a medical emergency    CALL THE PHYSICIAN:  ? If you develop a fever, severe abdominal pain or excessive bleeding from the paracentesis site within 24 hours of the procedure  ? If you experience severe lightheadedness or fainting, call you doctor immediately or come to the closest Emergency Department.  Do not drive yourself.  ? If you have questions or concerns regarding your procedure call the Radiologist                [facility]  Going Home after TIPS Revision                                                     After you go home:    Drink plenty of fluids.    Resume your normal diet    Do not scrub the procedure site vigorously for 3 days    No lotion or powder to the puncture site for 3 days    DO NOT do any strenuous exercise or lifting greater than 10 pounds for at least 2 days following your procedure  IF YOU WERE GIVEN SEDATION    No driving or alcoholic beverages today    Do not make any important or legal decisions today    We recommend an adult stay with you for the first 24 hours    CALL THE PHYSICIAN IF:    Monitor neck site for bleeding, swelling. If any bleeding or swelling immediately apply pressure and call the doctor.    If you notice any changes in your voice or breathing you should call your doctor immediately & come to the closest Emergency Department.  Do Not Drive Yourself.    You develop nausea or vomiting    You develop hives or a rash or any  unexplained itching    ADDITIONAL INSTRUCTIONS: Expect to have a bruise at the right neck for 7-10 days due to hematoma after procedure.      [facility] INTERVENTIONAL RADIOLOGY DEPARTMENT        Procedure Physician:  Dr. Tristan and Dr. Simon          Date of procedure:November 25, 2020        Telephone numbers:     [phone]  Monday-Friday 8:00 am to 4:30 pm                                              [phone]  After 4:30 pm Monday-Friday, Weekends & Holidays. Ask for the Interventional Radiologist on call.Someone is available  24 hours/day        [facility] toll free number: [phone]  Monday-Friday 8:00 am to 4:30 pm          Additional Information About Your Visit       [employer] Information    [employer] gives you secure access to your electronic health record. If you see a primary care provider, you can also send messages to your care team and make appointments. If you have questions, please call your primary care clinic.  If you do not have a primary care provider, please call [phone] and they will assist you.       Care EveryWhere ID    This is your Care EveryWhere ID. This could be used by other organizations to access your [facility] medical records  [id number]       Your Vitals Were  Most recent update: 11/25/2020 12:49 PM    Blood Pressure   120/58 (BP Location: Left arm)          Pulse   72          Temperature   98.1  F (36.7  C) (Oral)          Respirations   16          Weight   77.1 kg (170 lb)             Pulse Oximetry   98%    BMI (Body Mass Index)   24.39 kg/m           Primary Care Provider Office Phone # Fax #    Too Pedro [place] [phone]      Equal Access to Services    [facility]: Hadii aad ku hadasho Soomaali, waaxda luqadaha, qaybta kaalmada adeegyada, jenny roe hayeddie gaitan . So [facility] [phone].    ATENCIÓN: Si habla español, tiene a lee disposición servicios gratuitos de asistencia lingüística. Llame al [phone].    We comply with  applicable federal and state civil rights laws, including the [place] Human Rights Act. We do not discriminate on the basis of race, color, creed, Moravian, national origin, marital status, age, disability, sex, sexual orientation, or gender identity.       Thank you!    Thank you for choosing [facility] for your care. Our goal is always to provide you with excellent care. Hearing back from our patients is one way we can continue to improve our services. Please take a few minutes to complete the written survey that you may receive in the mail after you visit with us. Thank you!            Medication List      Medications          Morning Afternoon Evening Bedtime As Needed    order for DME  INSTRUCTIONS: Equipment being ordered: patellar stabilization knee brace with horseshoe, Large                       ASK your doctor about these medications          Morning Afternoon Evening Bedtime As Needed    acetaminophen 500 MG tablet  Also known as: TYLENOL  INSTRUCTIONS: Take 1,000 mg by mouth every 6 hours as needed for mild pain                     B-1 100 MG Tabs  INSTRUCTIONS: Take 1 tablet by mouth daily                     Constulose 10 GM/15ML solution  INSTRUCTIONS: Take 30 mLs (20 g) by mouth daily  Generic drug: lactulose                     fexofenadine 60 MG tablet  Also known as: ALLEGRA  INSTRUCTIONS: Take 1 tablet (60 mg) by mouth daily  Doctor's comments: Renal dosing, start with 60 mg daily                     furosemide 20 MG tablet  Also known as: LASIX  INSTRUCTIONS: Take 2 tablets (40 mg) by mouth daily                     MULTIPLE VITAMINS PO  INSTRUCTIONS: Take 1 tablet by mouth daily                     potassium chloride ER 20 MEQ CR tablet  Also known as: KLOR-CON M  INSTRUCTIONS: Take 2 tablets (40 mEq) by mouth daily                     rifaximin 550 MG Tabs tablet  Also known as: Xifaxan  INSTRUCTIONS: Take 1 tablet (550 mg) by mouth 2 times daily                     senna-docusate 8.6-50 MG  tablet  Also known as: SENOKOT-S/PERICOLACE  INSTRUCTIONS: Take 1-2 tablets by mouth 2 times daily                     sertraline 50 MG tablet  Also known as: ZOLOFT  INSTRUCTIONS: TAKE 1/2 TABLET BY MOUTH ONCE DAILY                     sodium bicarbonate 650 MG tablet  INSTRUCTIONS: Take 1 tablet (650 mg) by mouth 2 times daily                     spironolactone 50 MG tablet  Also known as: Aldactone  INSTRUCTIONS: Take 1 tablet (50 mg) by mouth daily                     VITAMIN C PO  INSTRUCTIONS: Take by mouth daily                     vitamin D3 50 mcg (2000 units) tablet  Also known as: CHOLECALCIFEROL  INSTRUCTIONS: Take 1 tablet by mouth daily                     Vitamin K2 100 MCG Caps  INSTRUCTIONS: Take 200 mcg by mouth daily

## 2020-11-25 NOTE — PROGRESS NOTES
At 1150 pt right neck noticed to have a small amount of bleeding.  Dressing removed and pressure held for 10mn.  New dressing applied and bleeding stopped.  At 1215 right neck slightly swollen and small amount of bleeding noted.  MD notified and pressure held for 10 minutes.  Dr. Simon came to examine patient.  Ibuprofen given.      1300: Pt up in kang and tolerated well.  VSS.  Right neck slightly swollen.  Dressing dry.  Dr. Gonzales here to see patient.  Discharge instructions went over with patient.  PIV d/c'd.  Pt wheeled to front lobby at 1340.  Pt wife also called on phone to update on discharge instructions.

## 2020-11-25 NOTE — PROGRESS NOTES
Pt admitted to 2A for a para and TIPS revision.  Consent and H and P up to date.  Labs not needed per Dr. Tristan.

## 2020-11-25 NOTE — DISCHARGE INSTRUCTIONS
[facility]      Interventional Radiology  Patient Instructions Following Paracentesis    AFTER YOU GO HOME:  ? If you were given sedation; we recommend that an adult stay with you for the first 24 hours.   No driving or alcoholic beverages for 24 hours.  ? Resume previous diet and medication  ? Limit strenuous physical activity such as lifting (>10 lbs.), straining, or exercising for 48 hours.  You may resume normal activity in 24 hours  ? Change gauze at the puncture site as needed to keep site dry.  Leaking of additional fluid is not uncommon during the first 24-48 hours.    CALL 911:  ? If this is a medical emergency    CALL THE PHYSICIAN:  ? If you develop a fever, severe abdominal pain or excessive bleeding from the paracentesis site within 24 hours of the procedure  ? If you experience severe lightheadedness or fainting, call you doctor immediately or come to the closest Emergency Department.  Do not drive yourself.  ? If you have questions or concerns regarding your procedure call the Radiologist                [facility]  Going Home after TIPS Revision                                                     After you go home:    Drink plenty of fluids.    Resume your normal diet    Do not scrub the procedure site vigorously for 3 days    No lotion or powder to the puncture site for 3 days    DO NOT do any strenuous exercise or lifting greater than 10 pounds for at least 2 days following your procedure  IF YOU WERE GIVEN SEDATION    No driving or alcoholic beverages today    Do not make any important or legal decisions today    We recommend an adult stay with you for the first 24 hours    CALL THE PHYSICIAN IF:    Monitor neck site for bleeding, swelling. If any bleeding or swelling immediately apply pressure and call the doctor.    If you notice any changes in your voice or breathing you should call your doctor immediately & come to the closest Emergency Department.  Do  Not Drive Yourself.    You develop nausea or vomiting    You develop hives or a rash or any unexplained itching    ADDITIONAL INSTRUCTIONS: Expect to have a bruise at the right neck for 7-10 days due to hematoma after procedure.      [facility] INTERVENTIONAL RADIOLOGY DEPARTMENT        Procedure Physician:  Dr. Tristan and Dr. Simon          Date of procedure:November 25, 2020        Telephone numbers:     [phone]  Monday-Friday 8:00 am to 4:30 pm                                              [phone]  After 4:30 pm Monday-Friday, Weekends & Holidays. Ask for the Interventional Radiologist on call.Someone is available  24 hours/day        [facility] toll free number: [phone]  Monday-Friday 8:00 am to 4:30 pm

## 2020-11-25 NOTE — PROGRESS NOTES
Patient arrived to floor with RN from IR s/p TIPS Revision/paracentesis. RIJ site CDI, no hematoma. R abd site CDI, no hematoma. VSS. Patient denies pain.

## 2020-11-25 NOTE — PRE-PROCEDURE
GENERAL PRE-PROCEDURE:   Procedure:  TIPS revision    Written consent obtained?: Yes    Risks and benefits: Risks, benefits and alternatives were discussed    Consent given by:  Patient  Patient states understanding of procedure being performed: Yes    Patient's understanding of procedure matches consent: Yes    Procedure consent matches procedure scheduled: Yes    Expected level of sedation:  Moderate  Appropriately NPO:  Yes  ASA Class:  Class 2- mild systemic disease, no acute problems, no functional limitations  Mallampati  :  Grade 2- soft palate, base of uvula, tonsillar pillars, and portion of posterior pharyngeal wall visible  Lungs:  Lungs clear with good breath sounds bilaterally  Heart:  Normal heart sounds and rate  History & Physical reviewed:  History and physical reviewed and no updates needed  Statement of review:  I have reviewed the lab findings, diagnostic data, medications, and the plan for sedation

## 2020-11-30 ENCOUNTER — TELEPHONE (OUTPATIENT)
Dept: VASCULAR SURGERY | Facility: CLINIC | Age: 55
End: 2020-11-30

## 2020-11-30 NOTE — TELEPHONE ENCOUNTER
Called pt to f/u up s/p tips revision    Left a msg for him to f/u on how he is doing s/p treatment    Asked him to return my call    Will also be planning for a f/up appt with him and Dr. Tristan as well in which or  will be calling him for these appt details.     Left direct line for him     .Denise LAZARO RN, BSN  Interventional Radiology/Vascular  Nurse Coordinator   Phone: [phone]  Fax: [phone]

## 2020-12-02 ENCOUNTER — TELEPHONE (OUTPATIENT)
Dept: VASCULAR SURGERY | Facility: CLINIC | Age: 55
End: 2020-12-02

## 2020-12-02 NOTE — TELEPHONE ENCOUNTER
Called pt to f/up on him s/p tips and returning his page.     He states that about 2 hours ago  He's not doing well per pt.     Denies fevers/N/V    Pain: yes in the abdominal area    Left sided pain  Constant pain    Chills today also.     Swelling?Yes, Legs.   Was able to eat breakfast today but now currently having chills and not feeling well to where he had to stop working.     I informed him that if he doesn't feel better after taking Tylenol then he should go to the ER for further assessment    He agrees to plan.     Denise LAZARO RN, BSN  Interventional Radiology/Vascular  Nurse Coordinator   Phone: [phone]  Fax: [phone]

## 2020-12-09 ENCOUNTER — PATIENT OUTREACH (OUTPATIENT)
Dept: GASTROENTEROLOGY | Facility: CLINIC | Age: 55
End: 2020-12-09

## 2020-12-11 ENCOUNTER — TELEPHONE (OUTPATIENT)
Dept: VASCULAR SURGERY | Facility: CLINIC | Age: 55
End: 2020-12-11

## 2020-12-11 NOTE — TELEPHONE ENCOUNTER
Called pt to fup on him s/p tips revision with Dr. Tristan    Left a msg for him to return my phone call.    Informed him that we will have our schedulers call him to make a follow up appt in 1 mos     Left direct line for him.    Denise LAZARO RN, BSN  Interventional Radiology/Vascular  Nurse Coordinator   Phone: [phone]  Fax: [phone]

## 2020-12-15 ENCOUNTER — PATIENT OUTREACH (OUTPATIENT)
Dept: GASTROENTEROLOGY | Facility: CLINIC | Age: 55
End: 2020-12-15

## 2020-12-15 ENCOUNTER — TELEPHONE (OUTPATIENT)
Dept: VASCULAR SURGERY | Facility: CLINIC | Age: 55
End: 2020-12-15

## 2020-12-15 NOTE — PROGRESS NOTES
"Patient called to check in. States he is doing well post TIPS revision. He felt \"a little weird\" for a few days afterwards, but that has gone away now. He denies fluid buildup in abdomen or lower extremity swelling. He is stooling adequately, appetite is back and feels he is eating well, and he is anxious to get the surgery done on his knee. That procedure has been postponed for now due to covid, and has not yet been rescheduled. Reminded him that Denise from  had called to check in post TIPS revision and provided her number. Reviewed next appointment with Dr. Bales on 2/1/21. Patient has no further questions or concerns at this time.  "

## 2020-12-15 NOTE — TELEPHONE ENCOUNTER
Returning pt's phone call regarding his VM about labs.     Informed him that I do see Vaishali's note in which it appears that he's doing o.k post TIPS.     Informed him that normally we will just get him in for follow up appt with no imaging/labs, however if he would like these tests to be done then he can let me know    Left direct line for him.     Denise LAZARO RN, BSN  Interventional Radiology/Vascular  Nurse Coordinator   Phone: [phone]  Fax: [phone]

## 2020-12-21 DIAGNOSIS — K70.31 ALCOHOLIC CIRRHOSIS OF LIVER WITH ASCITES (H): ICD-10-CM

## 2020-12-21 DIAGNOSIS — K76.82 HEPATIC ENCEPHALOPATHY (H): ICD-10-CM

## 2020-12-21 RX ORDER — LACTULOSE 10 G/15ML
SOLUTION ORAL
Qty: 2000 ML | Refills: 11 | Status: SHIPPED | OUTPATIENT
Start: 2020-12-21 | End: 2021-10-14

## 2021-01-05 ENCOUNTER — AMBULATORY - HEALTHEAST (OUTPATIENT)
Dept: SURGERY | Facility: CLINIC | Age: 56
End: 2021-01-05

## 2021-01-05 DIAGNOSIS — Z11.59 ENCOUNTER FOR SCREENING FOR OTHER VIRAL DISEASES: ICD-10-CM

## 2021-01-05 DIAGNOSIS — F12.11 MILD CANNABIS ABUSE IN SUSTAINED REMISSION IN CONTROLLED ENVIRONMENT: ICD-10-CM

## 2021-01-27 ENCOUNTER — PATIENT OUTREACH (OUTPATIENT)
Dept: GASTROENTEROLOGY | Facility: CLINIC | Age: 56
End: 2021-01-27

## 2021-01-27 NOTE — PROGRESS NOTES
Patient is scheduled for knee surgery next month. He is inquiring what he can take for pain relief during this recovery period. Discussed to avoid NSAIDS such as aleve, advil, ibuprofen. Tylenol with daily maximum of 2000 mg is okay. He should try to avoid narcotics, but if he required them to use them cautiously and sparingly, and to transition to plain tylenol when able. He would like Dr. Bales to provide his opinion as well.

## 2021-02-01 ENCOUNTER — VIRTUAL VISIT (OUTPATIENT)
Dept: GASTROENTEROLOGY | Facility: CLINIC | Age: 56
End: 2021-02-01
Attending: INTERNAL MEDICINE
Payer: COMMERCIAL

## 2021-02-01 DIAGNOSIS — K70.31 ALCOHOLIC CIRRHOSIS OF LIVER WITH ASCITES (H): Primary | ICD-10-CM

## 2021-02-01 PROCEDURE — 99213 OFFICE O/P EST LOW 20 MIN: CPT | Mod: 95 | Performed by: INTERNAL MEDICINE

## 2021-02-01 ASSESSMENT — PAIN SCALES - GENERAL: PAINLEVEL: NO PAIN (0)

## 2021-02-01 NOTE — LETTER
2/1/2021         RE: Ivan Bell  [address] [facility] [id number]        Dear Colleague,    Thank you for referring your patient, Ivan Bell, to the [facility] [place]. Please see a copy of my visit note below.    Ivan is a 55 year old who is being evaluated via a billable telephone visit.      What phone number would you like to be contacted at? [phone]  How would you like to obtain your AVS? Mail a copy  Phone call duration: 11 minutes  ___________________________________________________________________________________    [facility]    Hepatology follow-up    Follow-up visit for cirrhosis    Subjective:  55 year old male    Cirrhosis  - ETOH  - hx ascites, TIPS placement 5/14/18, 6/6/18; TIPS revision 10/29/19, 11/25/2020  - hx HE  - hx variceal bleed Oct 2017  - ETOH hepatitis March 2019  - last EGD Jun 2019- dimin EV, mild portal hypertensive gastropathy  - HCC screening- liver TIPS doppler U/S Nov 2020    Last visit 11/2020.  The patient underwent TIPS revision 11/25/2020.  He is due to undergo a right knee replacement in 2 weeks.  No new medicines, ER visits or hospital admissions since last clinic visit.      The patient is well today.  He denies any further abdominal distention or lower extremity edema.  He has not had a paracentesis in 3 months.  He is adherent to a low-sodium diet and his diuretics.       The patient denies any problems with confusion.  Bowels are moving twice per day.  He is currently taking lactulose as needed and rifaximin.      The patient denies jaundice, melena, hematemesis or hematochezia.      No history of fevers, sweats or chills.  No cough or dyspnea.      The patient last drank alcohol in 02/2019.  He continues to practice social distancing and wears a mask when out in public.       Medical hx Surgical hx   Past Medical History:   Diagnosis Date     Alcoholic cirrhosis (H)       Alcoholic hepatitis 03/2019     Gout      Hypertension      Hypertriglyceridemia      Left calcaneus fracture 1/9/2006 January 16, 2006: Fell 10 feet from ladder onto left foot on frozen ground on 1/9/06 at home.  Immediate pain and unable to walk- seen at [place] and diagnosed with calcaneus fracture     Portal vein thrombosis     left occlusion, partial main      Past Surgical History:   Procedure Laterality Date     ANKLE SURGERY Left      COLONOSCOPY N/A 3/31/2016    Procedure: COLONOSCOPY;  Surgeon: Rhys Uriostegui MD;  Location:  GI     ESOPHAGOSCOPY, GASTROSCOPY, DUODENOSCOPY (EGD), COMBINED N/A 3/31/2016    Procedure: COMBINED ESOPHAGOSCOPY, GASTROSCOPY, DUODENOSCOPY (EGD);  Surgeon: Rhys Uriostegui MD;  Location:  GI     ESOPHAGOSCOPY, GASTROSCOPY, DUODENOSCOPY (EGD), COMBINED N/A 3/9/2018    Procedure: COMBINED ESOPHAGOSCOPY, GASTROSCOPY, DUODENOSCOPY (EGD), BIOPSY SINGLE OR MULTIPLE;  EGD;  Surgeon: Gonzalo Wahl MD;  Location:  GI     ESOPHAGOSCOPY, GASTROSCOPY, DUODENOSCOPY (EGD), COMBINED N/A 6/7/2019    Procedure: ESOPHAGOGASTRODUODENOSCOPY (EGD);  Surgeon: Gonzalo Wahl MD;  Location:  GI     IR PARACENTESIS  10/29/2019     IR PARACENTESIS  11/25/2020     IR TRANSVEN INTRAHEPATIC PORTOSYST REV  10/29/2019     IR TRANSVEN INTRAHEPATIC PORTOSYST REV  11/25/2020     KNEE SURGERY Left      KNEE SURGERY Right      RELEASE TRIGGER FINGER Right 6/11/2020    Procedure: RELEASE, TRIGGER FINGER, right ring and long finger;  Surgeon: Pascual Valencia MD;  Location: [facility]     SIGMOIDOSCOPY FLEXIBLE N/A 10/31/2017    Procedure: SIGMOIDOSCOPY FLEXIBLE;;  Surgeon: Armaan Adams MD;  Location:  GI     TIPS Procedure  06/06/2018          Medications  Prior to Admission medications    Medication Sig Start Date End Date Taking? Authorizing Provider   acetaminophen (TYLENOL) 500 MG tablet Take 1,000 mg by mouth every 6 hours as needed for mild pain   Yes  Reported, Patient   Ascorbic Acid (VITAMIN C PO) Take by mouth daily   Yes Reported, Patient   fexofenadine (ALLEGRA) 60 MG tablet Take 1 tablet (60 mg) by mouth daily 4/17/20  Yes Citlali Morgan PA-C   furosemide (LASIX) 20 MG tablet Take 2 tablets (40 mg) by mouth daily 8/24/20  Yes Gonzalo Wahl MD   lactulose (CHRONULAC) 10 GM/15ML solution TAKE 30MLS BY MOUTH THREE TIMES DAILY AS NEEDED FOR CONSTIPATION (TAKE AS NEEDED TO MAINTAIN 3 TO 4 BOWEL MOVEMENTS DAILY) 12/21/20  Yes Gonzalo Wahl MD   Menaquinone-7 (VITAMIN K2) 100 MCG CAPS Take 200 mcg by mouth daily    Yes Reported, Patient   MULTIPLE VITAMINS PO Take 1 tablet by mouth daily   Yes Reported, Patient   order for DME Equipment being ordered: patellar stabilization knee brace with horseshoe, Large 7/16/20  Yes Edna Kelley MD   potassium chloride ER (KLOR-CON M) 20 MEQ CR tablet Take 2 tablets (40 mEq) by mouth daily 8/24/20  Yes Gonzalo Wahl MD   rifaximin (XIFAXAN) 550 MG TABS tablet Take 1 tablet (550 mg) by mouth 2 times daily 10/12/20  Yes Gonzalo Wahl MD   senna-docusate (SENOKOT-S/PERICOLACE) 8.6-50 MG tablet Take 1-2 tablets by mouth 2 times daily 6/11/20  Yes Pascual Valencia MD   sertraline (ZOLOFT) 50 MG tablet TAKE 1/2 TABLET BY MOUTH ONCE DAILY 6/8/20  Yes Gonzalo Wahl MD   sodium bicarbonate 650 MG tablet Take 1 tablet (650 mg) by mouth 2 times daily 8/10/20  Yes Alma Moses MD   spironolactone (ALDACTONE) 50 MG tablet Take 1 tablet (50 mg) by mouth daily 5/13/20  Yes Gonzalo Wahl MD   vitamin D3 (CHOLECALCIFEROL) 2000 units (50 mcg) tablet Take 1 tablet by mouth daily   Yes Unknown, Entered By History       Allergies  Allergies   Allergen Reactions     Prednisone Visual Disturbance     Trazodone Visual Disturbance     Benadryl [Diphenhydramine] Other (See Comments)     Delirium (visual and auditory hallucinations)     Oxycodone Other (See  Comments)     Delirium and constipation       Review of systems  A 10-point review of systems was negative    Examination  N/A    Laboratory  Nil recent    Radiology  TIPS revision 11/25/2020 reviewed- portosystemic gradient 19mmHg to 10mmHg    Assessment  55-year-old male who presents for followup of decompensated alcoholic cirrhosis complicated with ascites and hepatic encephalopathy.  MELD-Na= ?  Last ETOH= 02/2019.  No evidence of ascites following TIPS revision.  Hepatic encephalopathy well-controlled on current medications.  Up-to-date with HCC screening.  Will check routine blood work and if normal or stable, patient will be adequately optimized to undergo knee surgery as planned.      We discussed post-operative analgesia- would recommend simple analgesia with acetaminophen then tramadol or narcotic analgesia as needed.  The patient had significant issues with delirium in the past related to medications and he may have similar issues with tramadol or narcotic analgesia.     Plan  1.  Check CMP, INR, CBC  2.  Continue diuretics at current doses  3.  Continue lactulose and rifaximin  4.  Follow-up in 6 months    Gonzalo Bales MD  Hepatology  [facility]      Again, thank you for allowing me to participate in the care of your patient.        Sincerely,        Gonzalo Bales MD

## 2021-02-01 NOTE — PROGRESS NOTES
Ivan is a 55 year old who is being evaluated via a billable telephone visit.      What phone number would you like to be contacted at? [phone]  How would you like to obtain your AVS? Mail a copy  Phone call duration: 11 minutes  ___________________________________________________________________________________    [facility]    Hepatology follow-up    Follow-up visit for cirrhosis    Subjective:  55 year old male    Cirrhosis  - ETOH  - hx ascites, TIPS placement 5/14/18, 6/6/18; TIPS revision 10/29/19, 11/25/2020  - hx HE  - hx variceal bleed Oct 2017  - ETOH hepatitis March 2019  - last EGD Jun 2019- dimin EV, mild portal hypertensive gastropathy  - HCC screening- liver TIPS doppler U/S Nov 2020    Last visit 11/2020.  The patient underwent TIPS revision 11/25/2020.  He is due to undergo a right knee replacement in 2 weeks.  No new medicines, ER visits or hospital admissions since last clinic visit.      The patient is well today.  He denies any further abdominal distention or lower extremity edema.  He has not had a paracentesis in 3 months.  He is adherent to a low-sodium diet and his diuretics.       The patient denies any problems with confusion.  Bowels are moving twice per day.  He is currently taking lactulose as needed and rifaximin.      The patient denies jaundice, melena, hematemesis or hematochezia.      No history of fevers, sweats or chills.  No cough or dyspnea.      The patient last drank alcohol in 02/2019.  He continues to practice social distancing and wears a mask when out in public.       Medical hx Surgical hx   Past Medical History:   Diagnosis Date     Alcoholic cirrhosis (H)      Alcoholic hepatitis 03/2019     Gout      Hypertension      Hypertriglyceridemia      Left calcaneus fracture 1/9/2006 January 16, 2006: Fell 10 feet from ladder onto left foot on frozen ground on 1/9/06 at home.  Immediate pain and unable to walk- seen at [place] and diagnosed  with calcaneus fracture     Portal vein thrombosis     left occlusion, partial main      Past Surgical History:   Procedure Laterality Date     ANKLE SURGERY Left      COLONOSCOPY N/A 3/31/2016    Procedure: COLONOSCOPY;  Surgeon: Rhys Uriostegui MD;  Location:  GI     ESOPHAGOSCOPY, GASTROSCOPY, DUODENOSCOPY (EGD), COMBINED N/A 3/31/2016    Procedure: COMBINED ESOPHAGOSCOPY, GASTROSCOPY, DUODENOSCOPY (EGD);  Surgeon: Rhys Uriostegui MD;  Location:  GI     ESOPHAGOSCOPY, GASTROSCOPY, DUODENOSCOPY (EGD), COMBINED N/A 3/9/2018    Procedure: COMBINED ESOPHAGOSCOPY, GASTROSCOPY, DUODENOSCOPY (EGD), BIOPSY SINGLE OR MULTIPLE;  EGD;  Surgeon: Gonzalo Wahl MD;  Location:  GI     ESOPHAGOSCOPY, GASTROSCOPY, DUODENOSCOPY (EGD), COMBINED N/A 6/7/2019    Procedure: ESOPHAGOGASTRODUODENOSCOPY (EGD);  Surgeon: Gonzalo Wahl MD;  Location:  GI     IR PARACENTESIS  10/29/2019     IR PARACENTESIS  11/25/2020     IR TRANSVEN INTRAHEPATIC PORTOSYST REV  10/29/2019     IR TRANSVEN INTRAHEPATIC PORTOSYST REV  11/25/2020     KNEE SURGERY Left      KNEE SURGERY Right      RELEASE TRIGGER FINGER Right 6/11/2020    Procedure: RELEASE, TRIGGER FINGER, right ring and long finger;  Surgeon: Pascual Valencia MD;  Location: [facility]     SIGMOIDOSCOPY FLEXIBLE N/A 10/31/2017    Procedure: SIGMOIDOSCOPY FLEXIBLE;;  Surgeon: Armaan Adams MD;  Location:  GI     TIPS Procedure  06/06/2018          Medications  Prior to Admission medications    Medication Sig Start Date End Date Taking? Authorizing Provider   acetaminophen (TYLENOL) 500 MG tablet Take 1,000 mg by mouth every 6 hours as needed for mild pain   Yes Reported, Patient   Ascorbic Acid (VITAMIN C PO) Take by mouth daily   Yes Reported, Patient   fexofenadine (ALLEGRA) 60 MG tablet Take 1 tablet (60 mg) by mouth daily 4/17/20  Yes Citlali Morgan PA-C   furosemide (LASIX) 20 MG tablet Take 2 tablets (40 mg) by mouth daily  8/24/20  Yes Gonzalo Wahl MD   lactulose (CHRONULAC) 10 GM/15ML solution TAKE 30MLS BY MOUTH THREE TIMES DAILY AS NEEDED FOR CONSTIPATION (TAKE AS NEEDED TO MAINTAIN 3 TO 4 BOWEL MOVEMENTS DAILY) 12/21/20  Yes Gonzalo Wahl MD   Menaquinone-7 (VITAMIN K2) 100 MCG CAPS Take 200 mcg by mouth daily    Yes Reported, Patient   MULTIPLE VITAMINS PO Take 1 tablet by mouth daily   Yes Reported, Patient   order for DME Equipment being ordered: patellar stabilization knee brace with horseshoe, Large 7/16/20  Yes Edna Kelley MD   potassium chloride ER (KLOR-CON M) 20 MEQ CR tablet Take 2 tablets (40 mEq) by mouth daily 8/24/20  Yes Gonzalo Wahl MD   rifaximin (XIFAXAN) 550 MG TABS tablet Take 1 tablet (550 mg) by mouth 2 times daily 10/12/20  Yes Gonzalo Wahl MD   senna-docusate (SENOKOT-S/PERICOLACE) 8.6-50 MG tablet Take 1-2 tablets by mouth 2 times daily 6/11/20  Yes Pascual Valencia MD   sertraline (ZOLOFT) 50 MG tablet TAKE 1/2 TABLET BY MOUTH ONCE DAILY 6/8/20  Yes Gonzalo Wahl MD   sodium bicarbonate 650 MG tablet Take 1 tablet (650 mg) by mouth 2 times daily 8/10/20  Yes Alma Moses MD   spironolactone (ALDACTONE) 50 MG tablet Take 1 tablet (50 mg) by mouth daily 5/13/20  Yes Gonzalo Wahl MD   vitamin D3 (CHOLECALCIFEROL) 2000 units (50 mcg) tablet Take 1 tablet by mouth daily   Yes Unknown, Entered By History       Allergies  Allergies   Allergen Reactions     Prednisone Visual Disturbance     Trazodone Visual Disturbance     Benadryl [Diphenhydramine] Other (See Comments)     Delirium (visual and auditory hallucinations)     Oxycodone Other (See Comments)     Delirium and constipation       Review of systems  A 10-point review of systems was negative    Examination  N/A    Laboratory  Nil recent    Radiology  TIPS revision 11/25/2020 reviewed- portosystemic gradient 19mmHg to 10mmHg    Assessment  55-year-old male who presents  for followup of decompensated alcoholic cirrhosis complicated with ascites and hepatic encephalopathy.  MELD-Na= ?  Last ETOH= 02/2019.  No evidence of ascites following TIPS revision.  Hepatic encephalopathy well-controlled on current medications.  Up-to-date with HCC screening.  Will check routine blood work and if normal or stable, patient will be adequately optimized to undergo knee surgery as planned.      We discussed post-operative analgesia- would recommend simple analgesia with acetaminophen then tramadol or narcotic analgesia as needed.  The patient had significant issues with delirium in the past related to medications and he may have similar issues with tramadol or narcotic analgesia.     Plan  1.  Check CMP, INR, CBC  2.  Continue diuretics at current doses  3.  Continue lactulose and rifaximin  4.  Follow-up in 6 months    Gonzalo Bales MD  Hepatology  [facility]

## 2021-02-02 ASSESSMENT — MIFFLIN-ST. JEOR
SCORE: 1624.66
SCORE: 1619.66

## 2021-02-10 DIAGNOSIS — L29.9 ITCHING: ICD-10-CM

## 2021-02-10 DIAGNOSIS — K70.31 ALCOHOLIC CIRRHOSIS OF LIVER WITH ASCITES (H): ICD-10-CM

## 2021-02-10 DIAGNOSIS — K72.90 DECOMPENSATION OF CIRRHOSIS OF LIVER (H): ICD-10-CM

## 2021-02-10 DIAGNOSIS — K74.60 DECOMPENSATION OF CIRRHOSIS OF LIVER (H): ICD-10-CM

## 2021-02-11 ENCOUNTER — OFFICE VISIT (OUTPATIENT)
Dept: FAMILY MEDICINE | Facility: CLINIC | Age: 56
End: 2021-02-11
Payer: COMMERCIAL

## 2021-02-11 ENCOUNTER — AMBULATORY - HEALTHEAST (OUTPATIENT)
Dept: MULTI SPECIALTY CLINIC | Facility: CLINIC | Age: 56
End: 2021-02-11

## 2021-02-11 VITALS
WEIGHT: 176.2 LBS | DIASTOLIC BLOOD PRESSURE: 74 MMHG | RESPIRATION RATE: 24 BRPM | BODY MASS INDEX: 25.22 KG/M2 | SYSTOLIC BLOOD PRESSURE: 129 MMHG | TEMPERATURE: 97.1 F | HEIGHT: 70 IN | OXYGEN SATURATION: 100 % | HEART RATE: 77 BPM

## 2021-02-11 DIAGNOSIS — N18.2 CKD (CHRONIC KIDNEY DISEASE) STAGE 2, GFR 60-89 ML/MIN: ICD-10-CM

## 2021-02-11 DIAGNOSIS — Z13.220 LIPID SCREENING: ICD-10-CM

## 2021-02-11 DIAGNOSIS — Z13.220 SCREENING FOR HYPERLIPIDEMIA: Primary | ICD-10-CM

## 2021-02-11 DIAGNOSIS — N52.8 OTHER MALE ERECTILE DYSFUNCTION: ICD-10-CM

## 2021-02-11 DIAGNOSIS — M17.31 POST-TRAUMATIC OSTEOARTHRITIS OF RIGHT KNEE: ICD-10-CM

## 2021-02-11 DIAGNOSIS — K70.31 ALCOHOLIC CIRRHOSIS OF LIVER WITH ASCITES (H): ICD-10-CM

## 2021-02-11 LAB
ALBUMIN SERPL-MCNC: 2.5 G/DL (ref 3.4–5)
ALP SERPL-CCNC: 147 U/L (ref 40–150)
ALT SERPL W P-5'-P-CCNC: 19 U/L (ref 0–70)
ANION GAP SERPL CALCULATED.3IONS-SCNC: 3 MMOL/L (ref 3–14)
AST SERPL W P-5'-P-CCNC: 33 U/L (ref 0–45)
BILIRUB DIRECT SERPL-MCNC: 1.2 MG/DL (ref 0–0.2)
BILIRUB SERPL-MCNC: 2.4 MG/DL (ref 0.2–1.3)
BUN SERPL-MCNC: 15 MG/DL (ref 7–30)
CALCIUM SERPL-MCNC: 8.4 MG/DL (ref 8.5–10.1)
CHLORIDE SERPL-SCNC: 114 MMOL/L (ref 94–109)
CHOLEST SERPL-MCNC: 141 MG/DL
CHOLEST SERPL-MCNC: 141 MG/DL
CO2 SERPL-SCNC: 27 MMOL/L (ref 20–32)
CREAT SERPL-MCNC: 1.38 MG/DL (ref 0.66–1.25)
ERYTHROCYTE [DISTWIDTH] IN BLOOD BY AUTOMATED COUNT: 17.2 % (ref 10–15)
GFR SERPL CREATININE-BSD FRML MDRD: 56 ML/MIN/{1.73_M2}
GLUCOSE SERPL-MCNC: 91 MG/DL (ref 70–99)
HCT VFR BLD AUTO: 36.7 % (ref 40–53)
HDLC SERPL-MCNC: 81 MG/DL
HDLC SERPL-MCNC: 81 MG/DL
HGB BLD-MCNC: 11.8 G/DL (ref 13.3–17.7)
INR PPP: 1.48 (ref 0.86–1.14)
LDLC SERPL CALC-MCNC: 45 MG/DL
LDLC SERPL CALC-MCNC: 45 MG/DL
MCH RBC QN AUTO: 32.2 PG (ref 26.5–33)
MCHC RBC AUTO-ENTMCNC: 32.2 G/DL (ref 31.5–36.5)
MCV RBC AUTO: 100 FL (ref 78–100)
NON HDL CHOL. (LDL+VLDL): 60 MG/DL
NONHDLC SERPL-MCNC: 60 MG/DL
PLATELET # BLD AUTO: 52 10E9/L (ref 150–450)
POTASSIUM SERPL-SCNC: 4.5 MMOL/L (ref 3.4–5.3)
PROT SERPL-MCNC: 5.8 G/DL (ref 6.8–8.8)
RBC # BLD AUTO: 3.67 10E12/L (ref 4.4–5.9)
SODIUM SERPL-SCNC: 144 MMOL/L (ref 133–144)
TRIGL SERPL-MCNC: 73 MG/DL
TRIGLYCERIDES (HISTORICAL CONVERSION): 73 MG/DL
WBC # BLD AUTO: 3 10E9/L (ref 4–11)

## 2021-02-11 PROCEDURE — 80048 BASIC METABOLIC PNL TOTAL CA: CPT | Performed by: INTERNAL MEDICINE

## 2021-02-11 PROCEDURE — 93000 ELECTROCARDIOGRAM COMPLETE: CPT | Performed by: PHYSICIAN ASSISTANT

## 2021-02-11 PROCEDURE — 80076 HEPATIC FUNCTION PANEL: CPT | Performed by: INTERNAL MEDICINE

## 2021-02-11 PROCEDURE — 80061 LIPID PANEL: CPT | Performed by: INTERNAL MEDICINE

## 2021-02-11 PROCEDURE — 99214 OFFICE O/P EST MOD 30 MIN: CPT | Performed by: PHYSICIAN ASSISTANT

## 2021-02-11 PROCEDURE — 36415 COLL VENOUS BLD VENIPUNCTURE: CPT | Performed by: INTERNAL MEDICINE

## 2021-02-11 PROCEDURE — 85027 COMPLETE CBC AUTOMATED: CPT | Performed by: INTERNAL MEDICINE

## 2021-02-11 PROCEDURE — 85610 PROTHROMBIN TIME: CPT | Performed by: INTERNAL MEDICINE

## 2021-02-11 RX ORDER — SILDENAFIL CITRATE 20 MG/1
TABLET ORAL
Qty: 30 TABLET | Refills: 11 | Status: SHIPPED | OUTPATIENT
Start: 2021-02-11 | End: 2022-06-07

## 2021-02-11 ASSESSMENT — MIFFLIN-ST. JEOR: SCORE: 1625.49

## 2021-02-11 NOTE — PROGRESS NOTES
[facility]  [address] [facility]  [place] [address]  Phone: [phone]  Primary Provider: Too Washington  Pre-op Performing Provider: JACKELYN MORTON      PREOPERATIVE EVALUATION:  Today's date: 2/11/2021    Ivan Bell is a 55 year old male who presents for a preoperative evaluation.    Surgical Information:  Surgery/Procedure: right knee replacement  Surgery Location: [facility]  Surgeon: Dr. Landaverde  Surgery Date: 2/19/21  Time of Surgery: 11am  Where patient plans to recover: At home with family  Fax number for surgical facility: Note does not need to be faxed, will be available electronically in Epic.    Type of Anesthesia Anticipated: to be determined    Ivan was seen today for pre-op exam.    Diagnoses and all orders for this visit:    Screening for hyperlipidemia    Post-traumatic osteoarthritis of right knee    CKD (chronic kidney disease) stage 2, GFR 60-89 ml/min  -     Albumin Random Urine Quantitative with Creat Ratio  -     EKG 12-lead complete w/read - Clinics    Alcoholic cirrhosis of liver with ascites (H)  -     Hepatic panel  -     CBC with platelets  -     INR  -     Basic metabolic panel    Lipid screening  -     Lipid panel reflex to direct LDL Fasting    Other male erectile dysfunction  -     sildenafil (REVATIO) 20 MG tablet; Take 3-5 tabs 1/2-4 hours to relations      Ivan is cleared for surgery and appropriate anesthesia.        Subjective     HPI related to upcoming procedure: Right knee replacement    Preop Questions 2/11/2021   1. Have you ever had a heart attack or stroke? No   2. Have you ever had surgery on your heart or blood vessels, such as a stent placement, a coronary artery bypass, or surgery on an artery in your head, neck, heart, or legs? No   3. Do you have chest pain with activity? No   4. Do you have a history of  heart failure? No   5. Do you currently have a cold, bronchitis or symptoms of other infection? No   6. Do you have  a cough, shortness of breath, or wheezing? No   7. Do you or anyone in your family have previous history of blood clots? No   8. Do you or does anyone in your family have a serious bleeding problem such as prolonged bleeding following surgeries or cuts? No   9. Have you ever had problems with anemia or been told to take iron pills? No   10. Have you had any abnormal blood loss such as black, tarry or bloody stools? No   11. Have you ever had a blood transfusion? YES - 2 years ago   11a. Have you ever had a transfusion reaction? No   12. Are you willing to have a blood transfusion if it is medically needed before, during, or after your surgery? Yes   13. Have you or any of your relatives ever had problems with anesthesia? No   14. Do you have sleep apnea, excessive snoring or daytime drowsiness? No   15. Do you have any artifical heart valves or other implanted medical devices like a pacemaker, defibrillator, or continuous glucose monitor? No   16. Do you have artificial joints? YES - Left Heel has plate in it   17. Are you allergic to latex? No       Health Care Directive:  Patient does not have a Health Care Directive or Living Will: Discussed advance care planning with patient; however, patient declined at this time.    Preoperative Review of :   reviewed - no record of controlled substances prescribed.      Status of Chronic Conditions:  See problem list for active medical problems.  Problems all longstanding and stable, except as noted/documented.  See ROS for pertinent symptoms related to these conditions.      Review of Systems  Constitutional, neuro, ENT, endocrine, pulmonary, cardiac, gastrointestinal, genitourinary, musculoskeletal, integument and psychiatric systems are negative, except as otherwise noted.    Patient Active Problem List    Diagnosis Date Noted     Thrombocytopenia (H) 06/05/2020     Priority: Medium     CKD (chronic kidney disease) stage 2, GFR 60-89 ml/min 04/17/2020     Priority:  Medium     Seasonal allergic rhinitis, unspecified trigger 04/17/2020     Priority: Medium     Nephrolithiasis 09/23/2018     Priority: Medium     September 23, 2018 non-obstructing, incident finding on us.        Alcoholic cirrhosis of liver with ascites (H) 03/08/2016     Priority: Medium     September 23, 2018 now sober, ascites resolved, S/P TIP procedure 6/2018. Normal liver enzymes, diminished liver function.  INR 1.6, bilirubin 2.5, albumin 2.6. He  appears healthy. Doing well. Stressed the importance of abstaining from alcohol. See copy of recent us.     9/10/18:  Impression:   1. Patent TIPS with appropriate in stent velocities. Normal Doppler  evaluation of the remainder of the hepatic vasculature in the setting  of TIPS.  2. Cirrhotic hepatic morphology with evidence of portal hypertension,  including splenomegaly. No focal hepatic mass.  3. Cholelithiasis without evidence of cholecystitis.  4. Bilateral nonobstructing nephrolithiasis.            Hypertension goal BP (blood pressure) < 140/90 12/18/2012     Priority: Medium     24 hour contact given to patient  01/02/2012     Priority: Medium     EMERGENCY CARE PLAN  Presenting Problem Signs and Symptoms Treatment Plan    Questions or conerns during clinic hours    I will call the clinic directly     Questions or conerns outside clinic hours    I will call the 24 hour nurse line at [phone]    Patient needs to schedule an appointment    I will call the 24 hour scheduling team at [phone] or clinic directly    Same day treatment     I will call the clinic first, nurse line if after hours, urgent care and express care if needed                                 CARDIOVASCULAR SCREENING; LDL GOAL LESS THAN 130 10/31/2010     Priority: Medium     Erectile dysfunction 01/29/2008     Priority: Medium     September 23, 2018 no known CAD, no contraindications to sildenafil.        Gouty arthropathy 12/31/2006     Priority: Medium     Problem list name  updated by automated process. Provider to review and confirm  [employer] Update utility       Hypertriglyceridemia 08/03/2005     Priority: Medium     Last Exam: December 19, 2007  Last Lipids: CHOL      211   01/25/2007 LDL      104   01/25/2007 HDL       83   01/25/2007  Last LFTs:: AST      106   01/25/2007   ALT       53   01/25/2007    TRIG      120   01/25/2007  TRIG      115   01/16/2006  Meds: Lopid 600 bid - tolerating        Past Medical History:   Diagnosis Date     Alcoholic cirrhosis (H)      Alcoholic hepatitis 03/2019     Gout      Hypertension      Hypertriglyceridemia      Left calcaneus fracture 1/9/2006 January 16, 2006: Fell 10 feet from ladder onto left foot on frozen ground on 1/9/06 at home.  Immediate pain and unable to walk- seen at [place] and diagnosed with calcaneus fracture     Portal vein thrombosis     left occlusion, partial main     Past Surgical History:   Procedure Laterality Date     ANKLE SURGERY Left      COLONOSCOPY N/A 3/31/2016    Procedure: COLONOSCOPY;  Surgeon: Rhys Uriostegui MD;  Location:  GI     ESOPHAGOSCOPY, GASTROSCOPY, DUODENOSCOPY (EGD), COMBINED N/A 3/31/2016    Procedure: COMBINED ESOPHAGOSCOPY, GASTROSCOPY, DUODENOSCOPY (EGD);  Surgeon: Rhys Uriostegui MD;  Location:  GI     ESOPHAGOSCOPY, GASTROSCOPY, DUODENOSCOPY (EGD), COMBINED N/A 3/9/2018    Procedure: COMBINED ESOPHAGOSCOPY, GASTROSCOPY, DUODENOSCOPY (EGD), BIOPSY SINGLE OR MULTIPLE;  EGD;  Surgeon: Gonzalo Wahl MD;  Location:  GI     ESOPHAGOSCOPY, GASTROSCOPY, DUODENOSCOPY (EGD), COMBINED N/A 6/7/2019    Procedure: ESOPHAGOGASTRODUODENOSCOPY (EGD);  Surgeon: Gonzalo Wahl MD;  Location:  GI     IR PARACENTESIS  10/29/2019     IR PARACENTESIS  11/25/2020     IR TRANSVEN INTRAHEPATIC PORTOSYST REV  10/29/2019     IR TRANSVEN INTRAHEPATIC PORTOSYST REV  11/25/2020     KNEE SURGERY Left      KNEE SURGERY Right      RELEASE TRIGGER FINGER Right 6/11/2020     Procedure: RELEASE, TRIGGER FINGER, right ring and long finger;  Surgeon: Pascual Valencia MD;  Location: [facility] OR     SIGMOIDOSCOPY FLEXIBLE N/A 10/31/2017    Procedure: SIGMOIDOSCOPY FLEXIBLE;;  Surgeon: Armaan Adams MD;  Location: [facility] GI     TIPS Procedure  06/06/2018     Current Outpatient Medications   Medication Sig Dispense Refill     acetaminophen (TYLENOL) 500 MG tablet Take 1,000 mg by mouth every 6 hours as needed for mild pain       Ascorbic Acid (VITAMIN C PO) Take by mouth daily       fexofenadine (ALLEGRA) 60 MG tablet Take 1 tablet (60 mg) by mouth daily 30 tablet 1     furosemide (LASIX) 20 MG tablet Take 2 tablets (40 mg) by mouth daily 180 tablet 3     lactulose (CHRONULAC) 10 GM/15ML solution TAKE 30MLS BY MOUTH THREE TIMES DAILY AS NEEDED FOR CONSTIPATION (TAKE AS NEEDED TO MAINTAIN 3 TO 4 BOWEL MOVEMENTS DAILY) 2000 mL 11     Menaquinone-7 (VITAMIN K2) 100 MCG CAPS Take 200 mcg by mouth daily        MULTIPLE VITAMINS PO Take 1 tablet by mouth daily       potassium chloride ER (KLOR-CON M) 20 MEQ CR tablet Take 2 tablets (40 mEq) by mouth daily 120 tablet 3     rifaximin (XIFAXAN) 550 MG TABS tablet Take 1 tablet (550 mg) by mouth 2 times daily 180 tablet 3     senna-docusate (SENOKOT-S/PERICOLACE) 8.6-50 MG tablet Take 1-2 tablets by mouth 2 times daily 30 tablet 0     sertraline (ZOLOFT) 50 MG tablet TAKE 1/2 A TABLET BY MOUTH ONCE DAILY 45 tablet 3     sildenafil (REVATIO) 20 MG tablet Take 3-5 tabs 1/2-4 hours to relations 30 tablet 11     sodium bicarbonate 650 MG tablet Take 1 tablet (650 mg) by mouth 2 times daily 180 tablet 3     spironolactone (ALDACTONE) 50 MG tablet Take 1 tablet (50 mg) by mouth daily 90 tablet 3     vitamin D3 (CHOLECALCIFEROL) 2000 units (50 mcg) tablet Take 1 tablet by mouth daily       order for DME Equipment being ordered: patellar stabilization knee brace with horseshoe, Large 1 Device 0       Allergies   Allergen Reactions     Prednisone Visual Disturbance  "    Trazodone Visual Disturbance     Benadryl [Diphenhydramine] Other (See Comments)     Delirium (visual and auditory hallucinations)     Oxycodone Other (See Comments)     Delirium and constipation        Social History     Tobacco Use     Smoking status: Former Smoker     Packs/day: 0.00     Types: Dip, chew, snus or snuff     Quit date: 1998     Years since quittin.4     Smokeless tobacco: Former User     Types: Chew     Tobacco comment: 1 tin per 10 days.   Substance Use Topics     Alcohol use: No     Alcohol/week: 17.5 standard drinks     Types: 21 Cans of beer per week     Comment: last etoh 14, did have Odouls ~2017     Family History   Problem Relation Age of Onset     Family History Negative Mother      Family History Negative Father      Hypertension Father      Cerebrovascular Disease Father 87     Breast Cancer Maternal Grandmother      Rheumatoid Arthritis Daughter      Depression Daughter      Cancer - colorectal No family hx of      Prostate Cancer No family hx of      Liver Disease No family hx of      History   Drug Use No         Objective     /74   Pulse 77   Temp 97.1  F (36.2  C) (Tympanic)   Resp 24   Ht 1.77 m (5' 9.69\")   Wt 79.9 kg (176 lb 3.2 oz)   SpO2 100%   BMI 25.51 kg/m      Physical Exam    GENERAL APPEARANCE: healthy, alert and no distress     EYES: EOMI,  PERRL     HENT: ear canals and TM's normal and nose and mouth without ulcers or lesions     NECK: no adenopathy, no asymmetry, masses, or scars and thyroid normal to palpation     RESP: lungs clear to auscultation - no rales, rhonchi or wheezes     CV: regular rates and rhythm, normal S1 S2, no S3 or S4 and no murmur, click or rub     ABDOMEN:  soft, nontender, no HSM or masses and bowel sounds normal     MS: extremities normal- no gross deformities noted, no evidence of inflammation in joints, FROM in all extremities.     SKIN: no suspicious lesions or rashes     NEURO: Normal strength and tone, " sensory exam grossly normal, mentation intact and speech normal     PSYCH: mentation appears normal. and affect normal/bright     LYMPHATICS: No cervical adenopathy    Recent Labs   Lab Test 11/02/20  0850 10/12/20  0805   HGB 10.9* 11.5*   PLT 58* 58*   INR 1.77* 1.66*    145*   POTASSIUM 4.5 4.7   CR 1.22 1.20        Diagnostics:  Labs pending at this time.  Results will be reviewed when available.   EKG: appears normal, NSR, normal axis, normal intervals, no acute ST/T changes c/w ischemia, no LVH by voltage criteria, unchanged from previous tracings    Revised Cardiac Risk Index (RCRI):  The patient has the following serious cardiovascular risks for perioperative complications:   - No serious cardiac risks = 0 points     RCRI Interpretation: 0 points: Class I (very low risk - 0.4% complication rate)    Be fasting on the morning of surgery. Hold all AM meds.           Signed Electronically by: Cristopher Arriaga PA-C  Copy of this evaluation report is provided to requesting physician.    Preop [facility] Preop Guidelines    Revised Cardiac Risk Index

## 2021-02-15 ENCOUNTER — TELEPHONE (OUTPATIENT)
Dept: FAMILY MEDICINE | Facility: CLINIC | Age: 56
End: 2021-02-15

## 2021-02-15 RX ORDER — METHION/INOS/CHOL BT/B COM/LIV 110MG-86MG
CAPSULE ORAL
Qty: 110 TABLET | Refills: 0 | OUTPATIENT
Start: 2021-02-15

## 2021-02-15 NOTE — TELEPHONE ENCOUNTER
Reason for Call:  Other Fax    Detailed comments: Rafaela from [facility] calling to request a copy of his history and physical along with an EKG to be faxed to fax # [phone] attn: Rafaela    Phone Number Rafaela  can be reached at: Other phone number:      Best Time: anytime    Can we leave a detailed message on this number? YES    Call taken on 2/15/2021 at 1:48 PM by Jovi Skelton

## 2021-02-15 NOTE — TELEPHONE ENCOUNTER
Requested Prescriptions   Pending Prescriptions Disp Refills     Thiamine HCl (B-1) 100 MG TABS [Pharmacy Med Name: B-1 100MG TABS] 110 tablet 0     Sig: TAKE ONE TABLET BY MOUTH ONCE DAILY       There is no refill protocol information for this order        Medication was discontinued on 2/1/21    Megan JOYNER-RN  Triage Nurse  [facility]: [facility]

## 2021-02-16 DIAGNOSIS — Z11.59 ENCOUNTER FOR SCREENING FOR OTHER VIRAL DISEASES: ICD-10-CM

## 2021-02-16 LAB
LABORATORY COMMENT REPORT: NORMAL
SARS-COV-2 RNA RESP QL NAA+PROBE: NEGATIVE
SARS-COV-2 RNA RESP QL NAA+PROBE: NORMAL
SPECIMEN SOURCE: NORMAL
SPECIMEN SOURCE: NORMAL

## 2021-02-16 PROCEDURE — U0003 INFECTIOUS AGENT DETECTION BY NUCLEIC ACID (DNA OR RNA); SEVERE ACUTE RESPIRATORY SYNDROME CORONAVIRUS 2 (SARS-COV-2) (CORONAVIRUS DISEASE [COVID-19]), AMPLIFIED PROBE TECHNIQUE, MAKING USE OF HIGH THROUGHPUT TECHNOLOGIES AS DESCRIBED BY CMS-2020-01-R: HCPCS | Performed by: ORTHOPAEDIC SURGERY

## 2021-02-16 PROCEDURE — U0005 INFEC AGEN DETEC AMPLI PROBE: HCPCS | Performed by: ORTHOPAEDIC SURGERY

## 2021-02-17 ENCOUNTER — PATIENT OUTREACH (OUTPATIENT)
Dept: GASTROENTEROLOGY | Facility: CLINIC | Age: 56
End: 2021-02-17

## 2021-02-17 NOTE — PROGRESS NOTES
Patient inquiring about lab work from last week. Reviewed with patient and spouse, answered questions.

## 2021-02-18 ENCOUNTER — COMMUNICATION - HEALTHEAST (OUTPATIENT)
Dept: SCHEDULING | Facility: CLINIC | Age: 56
End: 2021-02-18

## 2021-02-19 ENCOUNTER — SURGERY - HEALTHEAST (OUTPATIENT)
Dept: SURGERY | Facility: CLINIC | Age: 56
End: 2021-02-19

## 2021-02-19 ENCOUNTER — RECORDS - HEALTHEAST (OUTPATIENT)
Dept: ADMINISTRATIVE | Facility: OTHER | Age: 56
End: 2021-02-19

## 2021-02-19 ENCOUNTER — ANESTHESIA - HEALTHEAST (OUTPATIENT)
Dept: SURGERY | Facility: CLINIC | Age: 56
End: 2021-02-19

## 2021-02-19 ENCOUNTER — RECORDS - HEALTHEAST (OUTPATIENT)
Dept: MEDSURG UNIT | Facility: CLINIC | Age: 56
End: 2021-02-19
Payer: COMMERCIAL

## 2021-02-19 DIAGNOSIS — M17.11 UNILATERAL PRIMARY OSTEOARTHRITIS, RIGHT KNEE: ICD-10-CM

## 2021-02-19 LAB
ABO/RH(D): NORMAL
ALBUMIN SERPL-MCNC: 2.7 G/DL (ref 3.5–5)
ALP SERPL-CCNC: 111 U/L (ref 45–120)
ALT SERPL W P-5'-P-CCNC: 15 U/L (ref 0–45)
ANION GAP SERPL CALCULATED.3IONS-SCNC: 8 MMOL/L (ref 5–18)
ANTIBODY SCREEN: NEGATIVE
AST SERPL W P-5'-P-CCNC: 40 U/L (ref 0–40)
BILIRUB SERPL-MCNC: 2.9 MG/DL (ref 0–1)
BUN SERPL-MCNC: 15 MG/DL (ref 8–22)
CALCIUM SERPL-MCNC: 8.4 MG/DL (ref 8.5–10.5)
CHLORIDE BLD-SCNC: 111 MMOL/L (ref 98–107)
CO2 SERPL-SCNC: 20 MMOL/L (ref 22–31)
CREAT SERPL-MCNC: 1.07 MG/DL (ref 0.7–1.3)
ERYTHROCYTE [DISTWIDTH] IN BLOOD BY AUTOMATED COUNT: 17.9 % (ref 11–14.5)
GFR SERPL CREATININE-BSD FRML MDRD: >60 ML/MIN/1.73M2
GLUCOSE BLD-MCNC: 101 MG/DL (ref 70–125)
HCT VFR BLD AUTO: 32.6 % (ref 40–54)
HGB BLD-MCNC: 11 G/DL (ref 14–18)
INR PPP: 1.7 (ref 0.9–1.1)
INR PPP: 1.83 (ref 0.9–1.1)
MCH RBC QN AUTO: 32.6 PG (ref 27–34)
MCHC RBC AUTO-ENTMCNC: 33.7 G/DL (ref 32–36)
MCV RBC AUTO: 97 FL (ref 80–100)
PLATELET # BLD AUTO: 61 THOU/UL (ref 140–440)
PLATELET # BLD AUTO: 62 THOU/UL (ref 140–440)
PMV BLD AUTO: 11 FL (ref 8.5–12.5)
POTASSIUM BLD-SCNC: 4.1 MMOL/L (ref 3.5–5)
PROT SERPL-MCNC: 5.5 G/DL (ref 6–8)
RBC # BLD AUTO: 3.37 MILL/UL (ref 4.4–6.2)
SODIUM SERPL-SCNC: 139 MMOL/L (ref 136–145)
WBC: 3.2 THOU/UL (ref 4–11)

## 2021-02-19 ASSESSMENT — MIFFLIN-ST. JEOR
SCORE: 1580.35
SCORE: 1585.35

## 2021-02-20 LAB
ALBUMIN SERPL-MCNC: 2.5 G/DL (ref 3.5–5)
ALP SERPL-CCNC: 88 U/L (ref 45–120)
ALT SERPL W P-5'-P-CCNC: 15 U/L (ref 0–45)
ANION GAP SERPL CALCULATED.3IONS-SCNC: 4 MMOL/L (ref 5–18)
AST SERPL W P-5'-P-CCNC: 36 U/L (ref 0–40)
BASOPHILS # BLD AUTO: 0 THOU/UL (ref 0–0.2)
BASOPHILS NFR BLD AUTO: 0 % (ref 0–2)
BILIRUB SERPL-MCNC: 2.6 MG/DL (ref 0–1)
BLD PROD TYP BPU: NORMAL
BLD PROD TYP BPU: NORMAL
BLOOD TYPE: 600
BLOOD TYPE: 6200
BUN SERPL-MCNC: 15 MG/DL (ref 8–22)
CALCIUM SERPL-MCNC: 8 MG/DL (ref 8.5–10.5)
CHLORIDE BLD-SCNC: 110 MMOL/L (ref 98–107)
CO2 SERPL-SCNC: 24 MMOL/L (ref 22–31)
CODING SYSTEM: NORMAL
CODING SYSTEM: NORMAL
COMPONENT (HISTORICAL CONVERSION): NORMAL
COMPONENT (HISTORICAL CONVERSION): NORMAL
CREAT SERPL-MCNC: 1.05 MG/DL (ref 0.7–1.3)
EOSINOPHIL # BLD AUTO: 0 THOU/UL (ref 0–0.4)
EOSINOPHIL NFR BLD AUTO: 0 % (ref 0–6)
ERYTHROCYTE [DISTWIDTH] IN BLOOD BY AUTOMATED COUNT: 17.1 % (ref 11–14.5)
GFR SERPL CREATININE-BSD FRML MDRD: >60 ML/MIN/1.73M2
GLUCOSE BLD-MCNC: 157 MG/DL (ref 70–125)
HCT VFR BLD AUTO: 28.8 % (ref 40–54)
HGB BLD-MCNC: 9.6 G/DL (ref 14–18)
IMM GRANULOCYTES # BLD: 0 THOU/UL
IMM GRANULOCYTES NFR BLD: 1 %
ISSUE DATE AND TIME: NORMAL
ISSUE DATE AND TIME: NORMAL
LYMPHOCYTES # BLD AUTO: 0.4 THOU/UL (ref 0.8–4.4)
LYMPHOCYTES NFR BLD AUTO: 9 % (ref 20–40)
MCH RBC QN AUTO: 32.8 PG (ref 27–34)
MCHC RBC AUTO-ENTMCNC: 33.3 G/DL (ref 32–36)
MCV RBC AUTO: 98 FL (ref 80–100)
MONOCYTES # BLD AUTO: 0.2 THOU/UL (ref 0–0.9)
MONOCYTES NFR BLD AUTO: 4 % (ref 2–10)
NEUTROPHILS # BLD AUTO: 3.5 THOU/UL (ref 2–7.7)
NEUTROPHILS NFR BLD AUTO: 86 % (ref 50–70)
PLATELET # BLD AUTO: 63 THOU/UL (ref 140–440)
PMV BLD AUTO: 9.8 FL (ref 8.5–12.5)
POTASSIUM BLD-SCNC: 4.2 MMOL/L (ref 3.5–5)
PROT SERPL-MCNC: 5.3 G/DL (ref 6–8)
RBC # BLD AUTO: 2.93 MILL/UL (ref 4.4–6.2)
SODIUM SERPL-SCNC: 138 MMOL/L (ref 136–145)
STATUS (HISTORICAL CONVERSION): NORMAL
STATUS (HISTORICAL CONVERSION): NORMAL
UNIT ABO/RH (HISTORICAL CONVERSION): NORMAL
UNIT ABO/RH (HISTORICAL CONVERSION): NORMAL
UNIT NUMBER: NORMAL
UNIT NUMBER: NORMAL
WBC: 4.1 THOU/UL (ref 4–11)

## 2021-02-21 LAB
BLD PROD TYP BPU: NORMAL
BLD PROD TYP BPU: NORMAL
BLOOD TYPE: 600
BLOOD TYPE: 600
CODING SYSTEM: NORMAL
CODING SYSTEM: NORMAL
COMPONENT (HISTORICAL CONVERSION): NORMAL
COMPONENT (HISTORICAL CONVERSION): NORMAL
ISSUE DATE AND TIME: NORMAL
ISSUE DATE AND TIME: NORMAL
STATUS (HISTORICAL CONVERSION): NORMAL
STATUS (HISTORICAL CONVERSION): NORMAL
UNIT ABO/RH (HISTORICAL CONVERSION): NORMAL
UNIT ABO/RH (HISTORICAL CONVERSION): NORMAL
UNIT NUMBER: NORMAL
UNIT NUMBER: NORMAL

## 2021-02-24 ENCOUNTER — PATIENT OUTREACH (OUTPATIENT)
Dept: GASTROENTEROLOGY | Facility: CLINIC | Age: 56
End: 2021-02-24

## 2021-02-24 NOTE — PROGRESS NOTES
Patient is s/p knee replacement surgery on 2/19/21. He has been recovering at home with no major issues or concerns. His one complaint it that he is urinating frequently and has been since last week. Prior to surgery he did pass about 3 kidney stones, and has been drinking more fluids per recommendation. He is also taking diuretics as prescribed, furosemide 40 mg and spironolactone 50 mg both taken in the AM. He is urinating about every hour around the clock. Patient denies urgency, pain or burning, foul smell or off color urine. He only notes some swelling in the toes on the side operated on, denies edema of feet/ankles or ascites.  Addendum: per Dr. Bales patient may try decreasing or stopping the diuretics for a couple days, but his lower extremity swelling may increase. Left this in a message for patient, and was able to discuss with spouse Elicia. She verbalized understanding.

## 2021-03-01 ENCOUNTER — TELEPHONE (OUTPATIENT)
Dept: GASTROENTEROLOGY | Facility: CLINIC | Age: 56
End: 2021-03-01

## 2021-03-01 NOTE — TELEPHONE ENCOUNTER
[facility] Call Center    Phone Message    May a detailed message be left on voicemail: yes     Reason for Call: Patient had knee surgery recently.  Patient passed 3 kidney stones prior to surgery.  Saturday, patient started to pass blood in his urine.  Currently has no pain.  Labs look great.  Patient and wife would like to speak with Dr. Bales or his care team in regards to the recent blood in his urine and what the  Thinks could be wrong.  Please reach out to Elicia.    Action Taken: Message routed to:  [facility] ([facility] Hepatology    Travel Screening: Not Applicable

## 2021-03-01 NOTE — TELEPHONE ENCOUNTER
Received a call from pt's spouse, Elicia, reporting that pt is urinating bright red blood today and wondering if additional follow up is needed. Per Elicia, pt has no pain and denied feeling lightheaded and/or dizzy. Reviewed with Elicia that bleeding is likely due to kidney stones and/or irritation from catheter that was placed for pt's surgery. Encouraged pt to follow up with primary care regarding these symptoms. Advised pt to present to urgent care or the emergency room if bleeding worsens and/or pt becomes symptomatic. Also reviewed pt's symptoms with Dr. Bales and Dr. Bales had no additional recommendations at this time. Elicia verbalized understanding and is agreeable to plan of care.

## 2021-03-04 ENCOUNTER — TRANSFERRED RECORDS (OUTPATIENT)
Dept: HEALTH INFORMATION MANAGEMENT | Facility: CLINIC | Age: 56
End: 2021-03-04

## 2021-03-25 ENCOUNTER — TELEPHONE (OUTPATIENT)
Dept: FAMILY MEDICINE | Facility: CLINIC | Age: 56
End: 2021-03-25

## 2021-03-25 NOTE — TELEPHONE ENCOUNTER
Do not see that patient was seen for his shoulder by Cristopher Arriaga.  Last seen by Cristopher Arriaga for pre op for right knee. Assuming patient needs to be seen to discuss.  Will send to provider to advise

## 2021-03-25 NOTE — TELEPHONE ENCOUNTER
Reason for Call:  Other     Detailed comments: Patient would like a referral and recommendation for a cortisone injection in shoulder.    Phone Number Patient can be reached at: Home number on file [phone] (home)    Best Time:     Can we leave a detailed message on this number? YES    Call taken on 3/25/2021 at 2:04 PM by Ava Wiseman

## 2021-03-26 ENCOUNTER — ANCILLARY PROCEDURE (OUTPATIENT)
Dept: GENERAL RADIOLOGY | Facility: CLINIC | Age: 56
End: 2021-03-26
Attending: PHYSICIAN ASSISTANT
Payer: COMMERCIAL

## 2021-03-26 ENCOUNTER — APPOINTMENT (OUTPATIENT)
Dept: CT IMAGING | Facility: CLINIC | Age: 56
End: 2021-03-26
Attending: EMERGENCY MEDICINE
Payer: COMMERCIAL

## 2021-03-26 ENCOUNTER — HOSPITAL ENCOUNTER (EMERGENCY)
Facility: CLINIC | Age: 56
Discharge: HOME | End: 2021-03-27
Attending: EMERGENCY MEDICINE | Admitting: EMERGENCY MEDICINE
Payer: COMMERCIAL

## 2021-03-26 ENCOUNTER — OFFICE VISIT (OUTPATIENT)
Dept: FAMILY MEDICINE | Facility: CLINIC | Age: 56
End: 2021-03-26
Payer: COMMERCIAL

## 2021-03-26 VITALS
OXYGEN SATURATION: 99 % | HEIGHT: 70 IN | WEIGHT: 174 LBS | DIASTOLIC BLOOD PRESSURE: 72 MMHG | TEMPERATURE: 98.6 F | BODY MASS INDEX: 24.91 KG/M2 | HEART RATE: 76 BPM | RESPIRATION RATE: 16 BRPM | SYSTOLIC BLOOD PRESSURE: 139 MMHG

## 2021-03-26 VITALS
SYSTOLIC BLOOD PRESSURE: 142 MMHG | HEART RATE: 87 BPM | RESPIRATION RATE: 16 BRPM | OXYGEN SATURATION: 100 % | TEMPERATURE: 98.5 F | BODY MASS INDEX: 25.19 KG/M2 | WEIGHT: 174 LBS | DIASTOLIC BLOOD PRESSURE: 75 MMHG

## 2021-03-26 DIAGNOSIS — M54.6 ACUTE LEFT-SIDED THORACIC BACK PAIN: ICD-10-CM

## 2021-03-26 DIAGNOSIS — M54.42 ACUTE LEFT-SIDED LOW BACK PAIN WITH LEFT-SIDED SCIATICA: ICD-10-CM

## 2021-03-26 DIAGNOSIS — M54.6 ACUTE LEFT-SIDED THORACIC BACK PAIN: Primary | ICD-10-CM

## 2021-03-26 LAB
ALBUMIN SERPL-MCNC: 2.7 G/DL (ref 3.4–5)
ALP SERPL-CCNC: 147 U/L (ref 40–150)
ALT SERPL W P-5'-P-CCNC: 19 U/L (ref 0–70)
ANION GAP SERPL CALCULATED.3IONS-SCNC: 2 MMOL/L (ref 3–14)
AST SERPL W P-5'-P-CCNC: 28 U/L (ref 0–45)
BILIRUB SERPL-MCNC: 2.5 MG/DL (ref 0.2–1.3)
BUN SERPL-MCNC: 12 MG/DL (ref 7–30)
CALCIUM SERPL-MCNC: 9 MG/DL (ref 8.5–10.1)
CHLORIDE SERPL-SCNC: 114 MMOL/L (ref 94–109)
CO2 SERPL-SCNC: 29 MMOL/L (ref 20–32)
CREAT SERPL-MCNC: 1.17 MG/DL (ref 0.66–1.25)
D DIMER PPP FEU-MCNC: 2.1 UG/ML FEU (ref 0–0.5)
ERYTHROCYTE [DISTWIDTH] IN BLOOD BY AUTOMATED COUNT: 17 % (ref 10–15)
GFR SERPL CREATININE-BSD FRML MDRD: 69 ML/MIN/{1.73_M2}
GLUCOSE SERPL-MCNC: 144 MG/DL (ref 70–99)
HCT VFR BLD AUTO: 34.4 % (ref 40–53)
HGB BLD-MCNC: 11 G/DL (ref 13.3–17.7)
LIPASE SERPL-CCNC: 281 U/L (ref 73–393)
MCH RBC QN AUTO: 32.6 PG (ref 26.5–33)
MCHC RBC AUTO-ENTMCNC: 32 G/DL (ref 31.5–36.5)
MCV RBC AUTO: 102 FL (ref 78–100)
PLATELET # BLD AUTO: 53 10E9/L (ref 150–450)
POTASSIUM SERPL-SCNC: 4.5 MMOL/L (ref 3.4–5.3)
PROT SERPL-MCNC: 5.7 G/DL (ref 6.8–8.8)
RBC # BLD AUTO: 3.37 10E12/L (ref 4.4–5.9)
SODIUM SERPL-SCNC: 145 MMOL/L (ref 133–144)
TROPONIN I SERPL-MCNC: <0.015 UG/L (ref 0–0.04)
WBC # BLD AUTO: 3.5 10E9/L (ref 4–11)

## 2021-03-26 PROCEDURE — 36415 COLL VENOUS BLD VENIPUNCTURE: CPT | Performed by: PHYSICIAN ASSISTANT

## 2021-03-26 PROCEDURE — 250N000009 HC RX 250: Performed by: EMERGENCY MEDICINE

## 2021-03-26 PROCEDURE — 71101 X-RAY EXAM UNILAT RIBS/CHEST: CPT | Mod: LT | Performed by: RADIOLOGY

## 2021-03-26 PROCEDURE — 258N000003 HC RX IP 258 OP 636: Performed by: EMERGENCY MEDICINE

## 2021-03-26 PROCEDURE — 93010 ELECTROCARDIOGRAM REPORT: CPT | Performed by: EMERGENCY MEDICINE

## 2021-03-26 PROCEDURE — 84484 ASSAY OF TROPONIN QUANT: CPT | Performed by: EMERGENCY MEDICINE

## 2021-03-26 PROCEDURE — 250N000011 HC RX IP 250 OP 636: Performed by: EMERGENCY MEDICINE

## 2021-03-26 PROCEDURE — 85027 COMPLETE CBC AUTOMATED: CPT | Performed by: PHYSICIAN ASSISTANT

## 2021-03-26 PROCEDURE — 99285 EMERGENCY DEPT VISIT HI MDM: CPT | Mod: 25 | Performed by: EMERGENCY MEDICINE

## 2021-03-26 PROCEDURE — 71275 CT ANGIOGRAPHY CHEST: CPT

## 2021-03-26 PROCEDURE — 96374 THER/PROPH/DIAG INJ IV PUSH: CPT | Performed by: EMERGENCY MEDICINE

## 2021-03-26 PROCEDURE — 83690 ASSAY OF LIPASE: CPT | Performed by: PHYSICIAN ASSISTANT

## 2021-03-26 PROCEDURE — 99215 OFFICE O/P EST HI 40 MIN: CPT | Performed by: PHYSICIAN ASSISTANT

## 2021-03-26 PROCEDURE — 80053 COMPREHEN METABOLIC PANEL: CPT | Performed by: PHYSICIAN ASSISTANT

## 2021-03-26 PROCEDURE — 85379 FIBRIN DEGRADATION QUANT: CPT | Performed by: PHYSICIAN ASSISTANT

## 2021-03-26 PROCEDURE — 96361 HYDRATE IV INFUSION ADD-ON: CPT | Performed by: EMERGENCY MEDICINE

## 2021-03-26 PROCEDURE — 72100 X-RAY EXAM L-S SPINE 2/3 VWS: CPT | Performed by: RADIOLOGY

## 2021-03-26 PROCEDURE — 93005 ELECTROCARDIOGRAM TRACING: CPT | Performed by: EMERGENCY MEDICINE

## 2021-03-26 RX ORDER — CYCLOBENZAPRINE HCL 10 MG
5-10 TABLET ORAL 3 TIMES DAILY PRN
Qty: 25 TABLET | Refills: 0 | Status: ON HOLD | OUTPATIENT
Start: 2021-03-26 | End: 2021-08-11

## 2021-03-26 RX ORDER — HYDROMORPHONE HYDROCHLORIDE 1 MG/ML
0.5 INJECTION, SOLUTION INTRAMUSCULAR; INTRAVENOUS; SUBCUTANEOUS
Status: DISCONTINUED | OUTPATIENT
Start: 2021-03-26 | End: 2021-03-27 | Stop reason: HOSPADM

## 2021-03-26 RX ORDER — IOPAMIDOL 755 MG/ML
76 INJECTION, SOLUTION INTRAVASCULAR ONCE
Status: COMPLETED | OUTPATIENT
Start: 2021-03-26 | End: 2021-03-26

## 2021-03-26 RX ADMIN — HYDROMORPHONE HYDROCHLORIDE 0.5 MG: 1 INJECTION, SOLUTION INTRAMUSCULAR; INTRAVENOUS; SUBCUTANEOUS at 22:52

## 2021-03-26 RX ADMIN — SODIUM CHLORIDE 100 ML: 9 INJECTION, SOLUTION INTRAVENOUS at 22:40

## 2021-03-26 RX ADMIN — SODIUM CHLORIDE, POTASSIUM CHLORIDE, SODIUM LACTATE AND CALCIUM CHLORIDE 1000 ML: 600; 310; 30; 20 INJECTION, SOLUTION INTRAVENOUS at 22:54

## 2021-03-26 RX ADMIN — IOPAMIDOL 76 ML: 755 INJECTION, SOLUTION INTRAVENOUS at 22:39

## 2021-03-26 ASSESSMENT — MIFFLIN-ST. JEOR: SCORE: 1620.51

## 2021-03-26 NOTE — PATIENT INSTRUCTIONS
Chiquita Love,    Thank you for allowing [facility] to manage your care.    I ordered some lab work, please go to the laboratory to get your studies.     ordered some xrays, please go to our radiology department to get your xrays.    I sent your prescriptions to your pharmacy.    For your pain, please use Tylenol 650mg.     Max acetaminophen (Tylenol) 4,000mg/24 hours    For severe pain not controlled by over the counter medications, please use cyclobenzaprine as prescribed. Do not use this medication while driving, operating machinery, with other sedating medications, or while drinking alcohol as it will make you drowsy.    I made a referral to orthopedics/sports medicine, they will be calling in approximately 1 week to set up your appointment.  If you do not hear from them, please call the specialty number on your after visit summary.     Please allow 1-2 business days for our office to contact you in regards to your laboratory/radiological studies.  If not done so, I encourage you to login into [employer] ([email or web address] to review your results as well.     If you have any questions or concerns, please feel free to call us at [phone]    Sincerely,    Justin Sanchez PA-C    Did you know?      You can schedule a video visit for follow-up appointments as well as future appointments for certain conditions.  Please see the below link.     [email or web address]    If you have not already done so,  I encourage you to sign up for [employer] ([email or web address]).  This will allow you to review your results, securely communicate with a provider, and schedule virtual visits as well.      Patient Education     Relieving Back Pain  Back pain is a common problem. You can strain back muscles by lifting too much weight or just by moving the wrong way. Back strain can be uncomfortable, even painful. And it can take weeks or months to improve. To help  yourself feel better and prevent future back strains, try these tips.  Important: Don't give aspirin to children or teens without first discussing it with your child's healthcare provider.  Ice    Ice reduces muscle pain and swelling. It helps most during the first 24 to 48 hours after an injury.    Wrap an ice pack or a bag of frozen peas in a thin towel. Never put ice directly on your skin.    Place the ice where your back hurts the most.    Don t ice for more than 20 minutes at a time.    You can use ice several times a day.  Medicines  Over-the-counter pain relievers include acetaminophen and anti-inflammatory medicines, which includes aspirin, naproxen, or ibuprofen. They can help ease discomfort. Some also reduce swelling.    Tell your healthcare provider about any medicines you are already taking.    Take medicines only as directed.  Manipulation and massage  Having manipulation by an osteopathic doctor or chiropractor may be helpful. Getting a massage also may help.   Heat  After the first 48 hours, heat can relax sore muscles and improve blood flow.    Try a warm bath or shower. Or use a heating pad set on low. To prevent a burn, keep a cloth between you and the heating pad.    Don t use a heating pad for more than 15 minutes at a time. Never sleep on a heating pad.  [employer] last reviewed this educational content on 6/1/2018 2000-2020 [employer]. All rights reserved. This information is not intended as a substitute for professional medical care. Always follow your healthcare professional's instructions.

## 2021-03-26 NOTE — PROGRESS NOTES
Assessment & Plan   Problem List Items Addressed This Visit     None      Visit Diagnoses     Acute left-sided thoracic back pain    -  Primary    Relevant Medications    cyclobenzaprine (FLEXERIL) 10 MG tablet    Other Relevant Orders    D dimer, quantitative (Completed)    XR Ribs & Chest Left G/E 3 Views (Completed)    Orthopedic & Spine  Referral    XR Lumbar Spine 2/3 Views (Completed)    Comprehensive metabolic panel (BMP + Alb, Alk Phos, ALT, AST, Total. Bili, TP) (Completed)    Lipase (Completed)    CBC with platelets (Completed)    Acute left-sided low back pain with left-sided sciatica        Relevant Medications    cyclobenzaprine (FLEXERIL) 10 MG tablet    Other Relevant Orders    XR Lumbar Spine 2/3 Views (Completed)    Comprehensive metabolic panel (BMP + Alb, Alk Phos, ALT, AST, Total. Bili, TP) (Completed)    Lipase (Completed)    CBC with platelets (Completed)         Impression is likely left thoracic back strain vs rib fracture, but no injury. XRs show old rib fractures, but no other worrisome issues such as pneumothorax or pneumonia.. Lipase and comp panel shows no pancreatitis or hepatobiliary process. D dimer sent to screen for PE given pain is pleuritic and he recently had a knee replacement. I cannot use the PERC rule based on his age though he is low risk based on Wells criteria. This returned abnormal and later in the day(8:45pm), I called him and discussed next steps. He felt more comfortable pursuing a CTA to rule out PE tonight and I feel this is prudent as well. No dizziness or worsening symptoms and he wishes to drive himself to the ER. Will call EMS should he worsen change. [place] [facility] [place]    Complete history and physical exam as below. AF with normal VS except for mildly elevated bp..    DDx discussed with and explained to the pt to their satisfaction.  All questions were answered at this time. Pt expressed  understanding of and agreement with this plan. Will go to the Emergency Department immediately. Patient left the call in no apparent distress.     54 minutes spent on the date of the encounter doing chart review, history and exam, documentation and further activities as noted above    See Patient Instructions    Return in about 2 weeks (around 4/9/2021) for a recheck of your symptoms if not improving, or go to an ER anytime if worsening/changing..    ROGER Baig  [facility] ERENDIRA Love is a 55 year old who presents for the following health issues:    HPI     Musculoskeletal problem/pain  Onset/Duration: 3-4 weeks  Description  Location: left shoulder and lower back   Joint Swelling: no  Redness: no  Pain: YES  Warmth: no  Intensity:  moderate  Progression of Symptoms:  worsening  Accompanying signs and symptoms:   Fevers: no  Numbness/tingling/weakness: no  History  Trauma to the area: no  Recent illness:  no  Previous similar problem: no  Previous evaluation:  no  Precipitating or alleviating factors:  Aggravating factors include: movement and sometimes with deep breaths.   Therapies tried and outcome: ice and acetaminophen    Review of Systems   Constitutional, HEENT, cardiovascular, pulmonary, gi and gu systems are negative, except as otherwise noted.      Objective    BP (!) 142/75 (BP Location: Left arm, Cuff Size: Adult Large)   Pulse 87   Temp 98.5  F (36.9  C) (Tympanic)   Resp 16   Wt 78.9 kg (174 lb)   SpO2 100%   BMI 25.19 kg/m    Body mass index is 25.19 kg/m .  Physical Exam  Vitals signs and nursing note reviewed.   Constitutional:       General: He is not in acute distress.     Appearance: He is not ill-appearing or diaphoretic.   HENT:      Head: Normocephalic and atraumatic.      Mouth/Throat:      Mouth: Mucous membranes are moist.   Eyes:      Conjunctiva/sclera: Conjunctivae normal.   Cardiovascular:      Rate and Rhythm: Normal rate and regular  rhythm.      Heart sounds: Normal heart sounds. No murmur. No friction rub. No gallop.    Pulmonary:      Effort: Pulmonary effort is normal. No respiratory distress.      Breath sounds: Normal breath sounds. No stridor. No wheezing, rhonchi or rales.   Abdominal:      General: Bowel sounds are normal. There is no distension.      Palpations: Abdomen is soft. There is no mass.      Tenderness: There is no abdominal tenderness. There is no guarding or rebound.      Hernia: No hernia is present.   Musculoskeletal:      Comments: Left scapular region tenderness in the region of the rhomboid.  No overlying signs of trauma or infection. No midline spinal tenderness. Left lumbar paraspinal tenderness. Right knee has surgical scars with erythema and warmth anteriorly. No calf or thing tenderness or edema.   Skin:     General: Skin is warm and dry.   Neurological:      General: No focal deficit present.      Mental Status: He is alert. Mental status is at baseline.   Psychiatric:         Mood and Affect: Mood normal.         Behavior: Behavior normal.          Results for orders placed or performed in visit on 03/26/21   XR Lumbar Spine 2/3 Views     Status: None    Narrative    XR LUMBAR SPINE TWO-THREE VIEWS 3/26/2021 3:09 PM     COMPARISON: None    HISTORY: Acute left-sided thoracic back pain. Acute left-sided low  back pain with left-sided sciatica.      Impression    IMPRESSION: Five lumbar-type vertebrae. Normal alignment. Vertebral  body heights normal. No fractures. Degenerative endplate spurring at  L1-L2, L2-L3 and L3-L4. Facet arthropathy at L4-L5 and L5-S1.    SIL OCHOA MD   Results for orders placed or performed in visit on 03/26/21   XR Ribs & Chest Left G/E 3 Views     Status: None    Narrative    XR LEFT RIBS AND CHEST THREE VIEWS   3/26/2021 3:09 PM     HISTORY: Left chest pain.    COMPARISON: Chest x-ray from 6/4/2019.      Impression    IMPRESSION: Heart size is normal. Pulmonary vasculature is  normal.  Lungs are clear. There are lucencies in the left fourth rib and right  third rib that appear to be residua from rib fracture. These were seen  on the chest x-ray from 6/4/2019. No definite acute rib fracture.    NELIA MIRELES MD   Results for orders placed or performed in visit on 03/26/21   D dimer, quantitative     Status: Abnormal   Result Value Ref Range    D Dimer 2.1 (H) 0.0 - 0.50 ug/ml FEU   Comprehensive metabolic panel (BMP + Alb, Alk Phos, ALT, AST, Total. Bili, TP)     Status: Abnormal   Result Value Ref Range    Sodium 145 (H) 133 - 144 mmol/L    Potassium 4.5 3.4 - 5.3 mmol/L    Chloride 114 (H) 94 - 109 mmol/L    Carbon Dioxide 29 20 - 32 mmol/L    Anion Gap 2 (L) 3 - 14 mmol/L    Glucose 144 (H) 70 - 99 mg/dL    Urea Nitrogen 12 7 - 30 mg/dL    Creatinine 1.17 0.66 - 1.25 mg/dL    GFR Estimate 69 >60 mL/min/[1.73_m2]    GFR Estimate If Black 79 >60 mL/min/[1.73_m2]    Calcium 9.0 8.5 - 10.1 mg/dL    Bilirubin Total 2.5 (H) 0.2 - 1.3 mg/dL    Albumin 2.7 (L) 3.4 - 5.0 g/dL    Protein Total 5.7 (L) 6.8 - 8.8 g/dL    Alkaline Phosphatase 147 40 - 150 U/L    ALT 19 0 - 70 U/L    AST 28 0 - 45 U/L   Lipase     Status: None   Result Value Ref Range    Lipase 281 73 - 393 U/L   CBC with platelets     Status: Abnormal   Result Value Ref Range    WBC 3.5 (L) 4.0 - 11.0 10e9/L    RBC Count 3.37 (L) 4.4 - 5.9 10e12/L    Hemoglobin 11.0 (L) 13.3 - 17.7 g/dL    Hematocrit 34.4 (L) 40.0 - 53.0 %     (H) 78 - 100 fl    MCH 32.6 26.5 - 33.0 pg    MCHC 32.0 31.5 - 36.5 g/dL    RDW 17.0 (H) 10.0 - 15.0 %    Platelet Count 53 (L) 150 - 450 10e9/L

## 2021-03-27 RX ORDER — OXYCODONE HYDROCHLORIDE 5 MG/1
5 TABLET ORAL EVERY 6 HOURS PRN
Qty: 4 TABLET | Refills: 0 | Status: SHIPPED | OUTPATIENT
Start: 2021-03-27 | End: 2021-12-07

## 2021-03-27 NOTE — DISCHARGE INSTRUCTIONS
Continue to use your home medications for the pain.  Return to the emergency department if you have worsening symptoms, fevers, spreading rash, difficulty breathing, or other concerns.  Otherwise follow-up in clinic for recheck in the next 1 to 2 weeks.

## 2021-03-27 NOTE — ED NOTES
Pt c/o on/off soa and lt side back pain for past 3 weeks.  Pt had knee surgery 4 weeks ago.  No cough, fever, or recent illness.

## 2021-03-27 NOTE — ED PROVIDER NOTES
History     Chief Complaint   Patient presents with     Abnormal Labs     saw his PCP today for back and shoulder pain. D-dimer elevated so came in for CT     Shortness of Breath     started 2 weeks ago. had a knee replacement 2/19 HPI  Ivan Bell is a 55 year old male who is about 1 month post right knee replacement who presents for evaluation of left thoracic back pain.  Symptoms have been ongoing for the past 3 weeks.  Pain is sharp, hurts to take a deep breath.  He feels mildly short of breath.  He denies cough or hemoptysis.  No fever, chills, headache, runny nose, sore throat, or body aches.  He denies nausea or vomiting no abdominal pain.  The pain in his knee has been ongoing since the surgery but has not been getting worse.  No increased redness or swelling.  He has been doing physical therapy.  Using acetaminophen for pain.    The patient went to clinic today and had blood drawn including a D-dimer which was elevated.    Allergies:  Allergies   Allergen Reactions     Prednisone Visual Disturbance     Trazodone Visual Disturbance     Benadryl [Diphenhydramine] Other (See Comments)     Delirium (visual and auditory hallucinations)     Oxycodone Other (See Comments)     Delirium and constipation       Problem List:    Patient Active Problem List    Diagnosis Date Noted     Thrombocytopenia (H) 06/05/2020     Priority: Medium     CKD (chronic kidney disease) stage 2, GFR 60-89 ml/min 04/17/2020     Priority: Medium     Seasonal allergic rhinitis, unspecified trigger 04/17/2020     Priority: Medium     Nephrolithiasis 09/23/2018     Priority: Medium     September 23, 2018 non-obstructing, incident finding on us.        Alcoholic cirrhosis of liver with ascites (H) 03/08/2016     Priority: Medium     September 23, 2018 now sober, ascites resolved, S/P TIP procedure 6/2018. Normal liver enzymes, diminished liver function.  INR 1.6, bilirubin 2.5, albumin 2.6. He  appears healthy. Doing well. Stressed  the importance of abstaining from alcohol. See copy of recent us.     9/10/18:  Impression:   1. Patent TIPS with appropriate in stent velocities. Normal Doppler  evaluation of the remainder of the hepatic vasculature in the setting  of TIPS.  2. Cirrhotic hepatic morphology with evidence of portal hypertension,  including splenomegaly. No focal hepatic mass.  3. Cholelithiasis without evidence of cholecystitis.  4. Bilateral nonobstructing nephrolithiasis.            Hypertension goal BP (blood pressure) < 140/90 12/18/2012     Priority: Medium     24 hour contact given to patient  01/02/2012     Priority: Medium     EMERGENCY CARE PLAN  Presenting Problem Signs and Symptoms Treatment Plan    Questions or conerns during clinic hours    I will call the clinic directly     Questions or conerns outside clinic hours    I will call the 24 hour nurse line at [phone]    Patient needs to schedule an appointment    I will call the 24 hour scheduling team at [phone] or clinic directly    Same day treatment     I will call the clinic first, nurse line if after hours, urgent care and express care if needed                                 CARDIOVASCULAR SCREENING; LDL GOAL LESS THAN 130 10/31/2010     Priority: Medium     Erectile dysfunction 01/29/2008     Priority: Medium     September 23, 2018 no known CAD, no contraindications to sildenafil.        Gouty arthropathy 12/31/2006     Priority: Medium     Problem list name updated by automated process. Provider to review and confirm  [employer] Update utility       Hypertriglyceridemia 08/03/2005     Priority: Medium     Last Exam: December 19, 2007  Last Lipids: CHOL      211   01/25/2007 LDL      104   01/25/2007 HDL       83   01/25/2007  Last LFTs:: AST      106   01/25/2007   ALT       53   01/25/2007    TRIG      120   01/25/2007  TRIG      115   01/16/2006  Meds: Lopid 600 bid - tolerating          Past Medical History:    Past Medical History:   Diagnosis Date      Alcoholic cirrhosis (H)      Alcoholic hepatitis 03/2019     Gout      Hypertension      Hypertriglyceridemia      Left calcaneus fracture 1/9/2006     Portal vein thrombosis        Past Surgical History:    Past Surgical History:   Procedure Laterality Date     ANKLE SURGERY Left      COLONOSCOPY N/A 3/31/2016    Procedure: COLONOSCOPY;  Surgeon: Rhys Uriostegui MD;  Location:  GI     ESOPHAGOSCOPY, GASTROSCOPY, DUODENOSCOPY (EGD), COMBINED N/A 3/31/2016    Procedure: COMBINED ESOPHAGOSCOPY, GASTROSCOPY, DUODENOSCOPY (EGD);  Surgeon: Rhys Uriostegui MD;  [facility]: [facility]     ESOPHAGOSCOPY, GASTROSCOPY, DUODENOSCOPY (EGD), COMBINED N/A 3/9/2018    Procedure: COMBINED ESOPHAGOSCOPY, GASTROSCOPY, DUODENOSCOPY (EGD), BIOPSY SINGLE OR MULTIPLE;  EGD;  Surgeon: Gonzalo Wahl MD;  Location:  GI     ESOPHAGOSCOPY, GASTROSCOPY, DUODENOSCOPY (EGD), COMBINED N/A 6/7/2019    Procedure: ESOPHAGOGASTRODUODENOSCOPY (EGD);  Surgeon: Gonzalo Wahl MD;  Location:  GI     IR PARACENTESIS  10/29/2019     IR PARACENTESIS  11/25/2020     IR TRANSVEN INTRAHEPATIC PORTOSYST REV  10/29/2019     IR TRANSVEN INTRAHEPATIC PORTOSYST REV  11/25/2020     KNEE SURGERY Left      KNEE SURGERY Right      RELEASE TRIGGER FINGER Right 6/11/2020    Procedure: RELEASE, TRIGGER FINGER, right ring and long finger;  Surgeon: Pascual Valencia MD;  Location: [facility]     SIGMOIDOSCOPY FLEXIBLE N/A 10/31/2017    Procedure: SIGMOIDOSCOPY FLEXIBLE;;  Surgeon: Armaan Adams MD;  Location: [facility]     TIPS Procedure  06/06/2018       Family History:    Family History   Problem Relation Age of Onset     Family History Negative Mother      Family History Negative Father      Hypertension Father      Cerebrovascular Disease Father 87     Breast Cancer Maternal Grandmother      Rheumatoid Arthritis Daughter      Depression Daughter      Cancer - colorectal No family hx of      Prostate Cancer No family hx of       "Liver Disease No family hx of        Social History:  Marital Status:   [2]  Social History     Tobacco Use     Smoking status: Former Smoker     Packs/day: 0.00     Types: Dip, chew, snus or snuff     Quit date: 1998     Years since quittin.5     Smokeless tobacco: Former User     Types: Chew     Tobacco comment: 1 tin per 10 days.   Substance Use Topics     Alcohol use: No     Alcohol/week: 17.5 standard drinks     Types: 21 Cans of beer per week     Comment: last etoh 16, did have Odouls ~2017     Drug use: No        Medications:    oxyCODONE (ROXICODONE) 5 MG tablet  acetaminophen (TYLENOL) 500 MG tablet  Ascorbic Acid (VITAMIN C PO)  cyclobenzaprine (FLEXERIL) 10 MG tablet  fexofenadine (ALLEGRA) 60 MG tablet  furosemide (LASIX) 20 MG tablet  lactulose (CHRONULAC) 10 GM/15ML solution  Menaquinone-7 (VITAMIN K2) 100 MCG CAPS  MULTIPLE VITAMINS PO  order for DME  potassium chloride ER (KLOR-CON M) 20 MEQ CR tablet  rifaximin (XIFAXAN) 550 MG TABS tablet  senna-docusate (SENOKOT-S/PERICOLACE) 8.6-50 MG tablet  sertraline (ZOLOFT) 50 MG tablet  sildenafil (REVATIO) 20 MG tablet  sodium bicarbonate 650 MG tablet  spironolactone (ALDACTONE) 50 MG tablet  vitamin D3 (CHOLECALCIFEROL) 2000 units (50 mcg) tablet          Review of Systems  Pertinent positives and negatives listed in the HPI, all other systems reviewed and are negative.    Physical Exam   BP: 120/70  Pulse: 87  Temp: 98.6  F (37  C)  Resp: 16  Height: 177.8 cm (5' 10\")  Weight: 78.9 kg (174 lb)  SpO2: 98 %      Physical Exam  Vitals signs and nursing note reviewed.   Constitutional:       General: He is in acute distress.      Appearance: He is well-developed. He is not diaphoretic.   HENT:      Head: Normocephalic and atraumatic.      Right Ear: External ear normal.      Left Ear: External ear normal.      Nose: Nose normal.   Eyes:      General: No scleral icterus.     Conjunctiva/sclera: Conjunctivae normal.   Neck:      " Musculoskeletal: Normal range of motion.   Cardiovascular:      Rate and Rhythm: Normal rate and regular rhythm.   Pulmonary:      Effort: Pulmonary effort is normal. No respiratory distress.      Breath sounds: No stridor.   Abdominal:      General: There is no distension.      Palpations: Abdomen is soft.   Musculoskeletal:      Right lower leg: No edema.      Left lower leg: No edema.      Comments: Right knee: Well-appearing surgical incision with very mild erythema around the incision site and slight swelling of the joint, no significant excess warmth, swelling, or redness outside of what would be expected for post-surgical site   Skin:     General: Skin is warm and dry.   Neurological:      Mental Status: He is alert and oriented to person, place, and time.   Psychiatric:         Behavior: Behavior normal.         ED Course        Procedures               EKG Interpretation:      Interpreted by Gonzalo Keller MD  Time reviewed: 2210  Symptoms at time of EKG: Thoracic back pain   Rhythm: normal sinus   Rate: normal  Axis: normal  Ectopy: none  Conduction: normal  ST Segments/ T Waves: No ST-T wave changes  Q Waves: none  Comparison to prior: Unchanged    Clinical Impression: normal EKG    Critical Care time:  none               Results for orders placed or performed during the hospital encounter of 03/26/21 (from the past 24 hour(s))   Troponin I   Result Value Ref Range    Troponin I ES <0.015 0.000 - 0.045 ug/L   CT Chest Pulmonary Embolism w Contrast    Narrative    EXAM: CT CHEST PULMONARY EMBOLISM W CONTRAST  LOCATION: [facility]  DATE/TIME: 3/26/2021 10:38 PM    INDICATION: PE suspected, low/intermediate prob, positive D-dimer. Shortness of breath.  COMPARISON: Abdominal CT 11/19/2019  TECHNIQUE: CT chest pulmonary angiogram during arterial phase injection of IV contrast. Multiplanar reformats and MIP reconstructions were performed. Dose reduction techniques were used.   CONTRAST: 76  ml Isovue 370    FINDINGS:  ANGIOGRAM CHEST: Pulmonary arteries are normal caliber and negative for pulmonary emboli. Thoracic aorta is negative for dissection. No CT evidence of right heart strain.    LUNGS AND PLEURA: Minimal dependent atelectasis, otherwise clear.    MEDIASTINUM/AXILLAE: Normal.    CORONARY ARTERY CALCIFICATION: Moderate.    UPPER ABDOMEN: TIPS shunt, cirrhosis, splenomegaly, ascites and cholelithiasis all unchanged.    MUSCULOSKELETAL: No bony lesion identified. Gynecomastia unchanged.      Impression    IMPRESSION:  1.  No pulmonary emboli identified. No etiology for new symptoms evident.  2.  Cirrhosis, ascites and cholelithiasis unchanged.       Medications   HYDROmorphone (PF) (DILAUDID) injection 0.5 mg (0.5 mg Intravenous Given 3/26/21 2252)   lactated ringers BOLUS 1,000 mL (0 mLs Intravenous Stopped 3/26/21 9536)   iopamidol (ISOVUE-370) solution 76 mL (76 mLs Intravenous Given 3/26/21 6359)   sodium chloride 0.9 % bag 500mL for CT scan flush use (100 mLs As instructed Given 3/26/21 2240)       Assessments & Plan (with Medical Decision Making)   55-year-old male who presents from clinic for elevated D-dimer in the postoperative setting.  Blood pressure is 147/72, temperature is 37  C, heart 83, SPO2 is 99% on room air.  EKG sinus rhythm without signs of ischemia or dysrhythmia.  Troponin is normal.  Given the elevated D-dimer he is sent for CT of his chest.  Images reviewed independently as well as radiology read reviewed, no signs of pulmonary embolism, pneumonia, pneumothorax, or other explanation for the patient's pain.  He is feeling better after IV hydromorphone.  He is safe to discharge with reassurance and instructions to return if worse, otherwise follow-up in clinic.  The patient is in agreement to this plan.    I have reviewed the nursing notes.    I have reviewed the findings, diagnosis, plan and need for follow up with the patient.       Discharge Medication List as of  3/26/2021 11:56 PM          Final diagnoses:   Acute left-sided thoracic back pain       3/26/2021   [facility] EMERGENCY DEPT     Gonzalo Keller MD  03/27/21 0121

## 2021-04-05 ENCOUNTER — OFFICE VISIT (OUTPATIENT)
Dept: FAMILY MEDICINE | Facility: CLINIC | Age: 56
End: 2021-04-05
Payer: COMMERCIAL

## 2021-04-05 ENCOUNTER — ANCILLARY PROCEDURE (OUTPATIENT)
Dept: GENERAL RADIOLOGY | Facility: CLINIC | Age: 56
End: 2021-04-05
Attending: PHYSICIAN ASSISTANT
Payer: COMMERCIAL

## 2021-04-05 VITALS
DIASTOLIC BLOOD PRESSURE: 75 MMHG | RESPIRATION RATE: 24 BRPM | WEIGHT: 177.2 LBS | HEART RATE: 93 BPM | OXYGEN SATURATION: 100 % | TEMPERATURE: 98.4 F | SYSTOLIC BLOOD PRESSURE: 134 MMHG | BODY MASS INDEX: 25.43 KG/M2

## 2021-04-05 DIAGNOSIS — M54.50 ACUTE MIDLINE LOW BACK PAIN WITHOUT SCIATICA: ICD-10-CM

## 2021-04-05 DIAGNOSIS — M54.2 CERVICALGIA: Primary | ICD-10-CM

## 2021-04-05 DIAGNOSIS — M54.6 TRIGGER POINT OF THORACIC REGION: ICD-10-CM

## 2021-04-05 DIAGNOSIS — M54.2 CERVICALGIA: ICD-10-CM

## 2021-04-05 PROCEDURE — 99214 OFFICE O/P EST MOD 30 MIN: CPT | Performed by: PHYSICIAN ASSISTANT

## 2021-04-05 PROCEDURE — 72040 X-RAY EXAM NECK SPINE 2-3 VW: CPT | Performed by: RADIOLOGY

## 2021-04-05 RX ORDER — CYCLOBENZAPRINE HCL 5 MG
5 TABLET ORAL 3 TIMES DAILY PRN
Qty: 30 TABLET | Refills: 0 | Status: ON HOLD | OUTPATIENT
Start: 2021-04-05 | End: 2021-08-11

## 2021-04-05 RX ORDER — PREDNISONE 20 MG/1
20 TABLET ORAL 2 TIMES DAILY
Qty: 14 TABLET | Refills: 0 | Status: SHIPPED | OUTPATIENT
Start: 2021-04-05 | End: 2021-04-12

## 2021-04-05 NOTE — PROGRESS NOTES
Subjective   Ivan is a 55 year old who presents for the following health issuesI       Hospital Follow-up Visit:    Hospital/Nursing Home/IP Rehab Facility: [facility]  Date of Admission: 03/26/21  Date of Discharge: 03/27/21  Reason(s) for Admission: Abnormal Labs, Acute left sided thoracic back pain    Back pain and shoulder pain are the same - no improvement      Was your hospitalization related to COVID-19? No   Problems taking medications regularly:  None  Medication changes since discharge: None  Problems adhering to non-medication therapy:  None    Left upper back and midline low back pain .  Reviewed hospital work up and lumbar spine xrays.  Some tingling right leg. Some neck pain.    Neck pain x years.    Low back pain x 1 mos . No specific injury.          Review of Systems   Constitutional, HEENT, cardiovascular, pulmonary, GI, , musculoskeletal, neuro, skin, endocrine and psych systems are negative, except as otherwise noted.      Objective    /75   Pulse 93   Temp 98.4  F (36.9  C) (Tympanic)   Resp 24   Wt 80.4 kg (177 lb 3.2 oz)   SpO2 100%   BMI 25.43 kg/m    Body mass index is 25.43 kg/m .  Physical Exam   BACK: Lumbosacral spine area reveals local tenderness.  Painful and reduced LS ROM noted. Straight leg raise is negative .  DTR's, motor strength and sensation normal, including heel and toe gait.  Perifpheral pulses are palpable.  Hipes and knees have full range of motion without pain.  No abdominal tenderness, mass or organomegaly.    Neck rom limited. Some pain. Negative spurlings test.   UE and LE strength rom and dtr's normal and symmetric.     Ivan was seen today for hospital f/u.    Diagnoses and all orders for this visit:    Cervicalgia  -     XR Cervical Spine 2/3 Views; Future  -     predniSONE (DELTASONE) 20 MG tablet; Take 1 tablet (20 mg) by mouth 2 times daily for 7 days  -     cyclobenzaprine (FLEXERIL) 5 MG tablet; Take 1 tablet (5 mg) by mouth  3 times daily as needed for muscle spasms  -     [facility]; Future    Trigger point of thoracic region  -     predniSONE (DELTASONE) 20 MG tablet; Take 1 tablet (20 mg) by mouth 2 times daily for 7 days  -     cyclobenzaprine (FLEXERIL) 5 MG tablet; Take 1 tablet (5 mg) by mouth 3 times daily as needed for muscle spasms  -     [facility]; Future    Acute midline low back pain without sciatica  -     predniSONE (DELTASONE) 20 MG tablet; Take 1 tablet (20 mg) by mouth 2 times daily for 7 days  -     cyclobenzaprine (FLEXERIL) 5 MG tablet; Take 1 tablet (5 mg) by mouth 3 times daily as needed for muscle spasms  -     [facility]; Future      work on lifestyle modification  Advised supportive and symptomatic treatment.  Follow up with Provider - if condition persists or worsens.

## 2021-04-08 ENCOUNTER — TRANSFERRED RECORDS (OUTPATIENT)
Dept: HEALTH INFORMATION MANAGEMENT | Facility: CLINIC | Age: 56
End: 2021-04-08

## 2021-04-13 ENCOUNTER — THERAPY VISIT (OUTPATIENT)
Dept: PHYSICAL THERAPY | Facility: CLINIC | Age: 56
End: 2021-04-13
Attending: PHYSICIAN ASSISTANT
Payer: COMMERCIAL

## 2021-04-13 DIAGNOSIS — M54.50 ACUTE LOW BACK PAIN: ICD-10-CM

## 2021-04-13 DIAGNOSIS — M54.2 CERVICALGIA: ICD-10-CM

## 2021-04-13 DIAGNOSIS — M54.50 ACUTE MIDLINE LOW BACK PAIN WITHOUT SCIATICA: ICD-10-CM

## 2021-04-13 DIAGNOSIS — M54.6 TRIGGER POINT OF THORACIC REGION: ICD-10-CM

## 2021-04-13 PROCEDURE — 97110 THERAPEUTIC EXERCISES: CPT | Mod: GP | Performed by: PHYSICAL THERAPIST

## 2021-04-13 PROCEDURE — 97162 PT EVAL MOD COMPLEX 30 MIN: CPT | Mod: GP | Performed by: PHYSICAL THERAPIST
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
## 2021-04-20 ENCOUNTER — THERAPY VISIT (OUTPATIENT)
Dept: PHYSICAL THERAPY | Facility: CLINIC | Age: 56
End: 2021-04-20
Payer: COMMERCIAL

## 2021-04-20 DIAGNOSIS — M54.2 CERVICALGIA: Primary | ICD-10-CM

## 2021-04-20 DIAGNOSIS — M54.50 ACUTE LOW BACK PAIN: ICD-10-CM

## 2021-04-20 PROCEDURE — 97140 MANUAL THERAPY 1/> REGIONS: CPT | Mod: GP | Performed by: PHYSICAL THERAPIST

## 2021-04-20 PROCEDURE — 97530 THERAPEUTIC ACTIVITIES: CPT | Mod: GP | Performed by: PHYSICAL THERAPIST

## 2021-04-20 PROCEDURE — 97110 THERAPEUTIC EXERCISES: CPT | Mod: GP | Performed by: PHYSICAL THERAPIST

## 2021-04-27 ENCOUNTER — TRANSFERRED RECORDS (OUTPATIENT)
Dept: HEALTH INFORMATION MANAGEMENT | Facility: CLINIC | Age: 56
End: 2021-04-27

## 2021-04-29 DIAGNOSIS — E87.6 HYPOKALEMIA: ICD-10-CM

## 2021-04-30 RX ORDER — POTASSIUM CHLORIDE 1500 MG/1
TABLET, EXTENDED RELEASE ORAL
Qty: 120 TABLET | Refills: 1 | Status: SHIPPED | OUTPATIENT
Start: 2021-04-30 | End: 2021-09-08

## 2021-05-25 ENCOUNTER — TELEPHONE (OUTPATIENT)
Dept: FAMILY MEDICINE | Facility: CLINIC | Age: 56
End: 2021-05-25

## 2021-05-25 DIAGNOSIS — M54.50 ACUTE MIDLINE LOW BACK PAIN WITHOUT SCIATICA: Primary | ICD-10-CM

## 2021-05-25 NOTE — TELEPHONE ENCOUNTER
Routing to Cristopher Arriaga PA-C- see phone message    LV 4/5/21 for back pain. Pt calling in wondering if he could come in for a Cortizone injection. He is unable to physically do anything d/t is back pain. He said he had a Cortizone injection in the past and it worked well for him.    Priyanka Layton RN

## 2021-05-25 NOTE — TELEPHONE ENCOUNTER
Duplicate, see other encounter sent to provider.  Farida Alexander RN  [facility]

## 2021-05-25 NOTE — TELEPHONE ENCOUNTER
"Patient calling back, has not heard back from provider, he declines to just schedule evaluation for his back pain until he knows he would also get an injection.    Says it has been \"quite a while since he's had injection done.    Routed again to Cristopher Arriaga and will ask MA to send message to Cristopher to alert him of the message.  Vaishali Abbasi RN  [facility]      "

## 2021-05-25 NOTE — TELEPHONE ENCOUNTER
Left message on voice mail for patient to call clinic.   [phone]  Farida Alexander RN  [facility]

## 2021-05-25 NOTE — TELEPHONE ENCOUNTER
Patient states he was waiting for a call back from clinic. He is inquiring about a cortisone injection in lower back. Patient is unable to work.

## 2021-05-26 ENCOUNTER — TELEPHONE (OUTPATIENT)
Dept: VASCULAR SURGERY | Facility: CLINIC | Age: 56
End: 2021-05-26

## 2021-05-26 NOTE — TELEPHONE ENCOUNTER
Called pt to follow-up on msg that he needed to come back and see Dr. Tristan.    VM box is full    Denise LAZARO RN, BSN  Interventional Radiology/Vascular  Nurse Coordinator   Phone: [phone]  Fax: [phone]

## 2021-05-26 NOTE — TELEPHONE ENCOUNTER
Called patient and advised him of the message below.    He stated that he is having low back pain.  He stated that the prednisone did not help him at all.      Megan JOYNER-RN  Triage Nurse  [facility]    '

## 2021-05-26 NOTE — TELEPHONE ENCOUNTER
Spoke with patient patient asking for instructions?  Farida Alexander RN  [facility]

## 2021-05-26 NOTE — TELEPHONE ENCOUNTER
Find out : low back pain , mid back pain, neck pain? Next step should be an mri. Did the prednisone I put him on last month help?

## 2021-05-27 NOTE — TELEPHONE ENCOUNTER
Tc tried calling and the message stated mailbox is full and cannot take any messages at this time.

## 2021-06-02 ENCOUNTER — HOSPITAL ENCOUNTER (OUTPATIENT)
Dept: MRI IMAGING | Facility: CLINIC | Age: 56
Discharge: HOME | End: 2021-06-02
Attending: PHYSICIAN ASSISTANT | Admitting: PHYSICIAN ASSISTANT
Payer: COMMERCIAL

## 2021-06-02 DIAGNOSIS — M54.50 ACUTE MIDLINE LOW BACK PAIN WITHOUT SCIATICA: ICD-10-CM

## 2021-06-02 PROCEDURE — 72148 MRI LUMBAR SPINE W/O DYE: CPT

## 2021-06-03 ENCOUNTER — TELEPHONE (OUTPATIENT)
Dept: FAMILY MEDICINE | Facility: CLINIC | Age: 56
End: 2021-06-03

## 2021-06-03 DIAGNOSIS — M54.42 ACUTE LEFT-SIDED LOW BACK PAIN WITH LEFT-SIDED SCIATICA: Primary | ICD-10-CM

## 2021-06-03 NOTE — TELEPHONE ENCOUNTER
Reason for Call:  Other     Detailed comments: Patient would like to know if his imaging results a re available     Phone Number Patient can be reached at: Home number on file [phone] (home)    Best Time:     Can we leave a detailed message on this number? YES    Call taken on 6/3/2021 at 2:22 PM by Ava Wiseman

## 2021-06-03 NOTE — TELEPHONE ENCOUNTER
See result note:    Written by Cristopher Arriaga PA-C on 6/3/2021  6:04 AM  Ivan,   Your mri revealed a herniated disk at the L4-L5 level that appears to be impinging on a nerve. As a result of this, I am referring you to a spine specialist to discuss your treatment options.     Cristopher      I called and spoke to patient, advised him of result above and gave him phone number to call to schedule with spine specialist.    Patient verbalized understanding of and agreement with plan.    Vaishali Abbasi RN  [facility]

## 2021-06-04 ENCOUNTER — OFFICE VISIT (OUTPATIENT)
Dept: NEUROSURGERY | Facility: CLINIC | Age: 56
End: 2021-06-04
Attending: PHYSICIAN ASSISTANT
Payer: COMMERCIAL

## 2021-06-04 VITALS
DIASTOLIC BLOOD PRESSURE: 79 MMHG | WEIGHT: 173 LBS | RESPIRATION RATE: 20 BRPM | SYSTOLIC BLOOD PRESSURE: 151 MMHG | HEART RATE: 73 BPM | BODY MASS INDEX: 24.82 KG/M2

## 2021-06-04 DIAGNOSIS — M54.42 ACUTE LEFT-SIDED LOW BACK PAIN WITH LEFT-SIDED SCIATICA: ICD-10-CM

## 2021-06-04 PROCEDURE — 99203 OFFICE O/P NEW LOW 30 MIN: CPT | Performed by: PHYSICIAN ASSISTANT

## 2021-06-04 NOTE — LETTER
6/4/2021         RE: Ivan Bell  [address] [address] [id number]        Dear Colleague,    Thank you for referring your patient, Ivan Bell, to the [facility] [place]. Please see a copy of my visit note below.    Neurosurgery Consult    HPI    Mr. Bell is a 55-year-old male referred to us for evaluation of low back pain and left leg pain.  His symptoms have been present for about 4 weeks.  He did have a previous episode similar to this sometime in the past that resolved spontaneously.  He underwent a lumbar MRI recently which demonstrated a left L4-5 disc bulge.  He has tried an oral steroid pack which was not particularly helpful and has tried physical therapy which also did not help him.  He has not yet had an injection.  He denies any focal weakness in his left leg, he states his symptoms are intermittent but can be quite severe at times, he does say that his back pain is his major concern and leg pain is a secondary issue.  He denies any bowel or bladder symptoms or saddle anesthesia.    Medical history  Neck pain  Alcoholic cirrhosis of liver with ascites  Chronic kidney disease      Social history  Works in [employer]      B/P: 151/79, T: Data Unavailable, P: 73, R: 20       Exam    Alert and oriented no acute distress  Bilateral lower extremities with 5/5 strength  Reflexes absent right patella, 2+ left patella, 2+ bilateral ankle  Negative straight leg raise on the right, positive on the left  Negative ankle clonus negative Babinski bilaterally  Lumbar spine tender to palpation on the left  Able to stand on heels and toes  Gait is antalgic on the left    Imaging    Lumbar MRI demonstrates left L4-5 disc protrusion compressing the left L4 nerve in the lateral recess.    Assessment    Low back pain  Left leg radiculopathy    Plan:      I have sent the patient for an epidural steroid injection.  He will contact us a few weeks after the  injection to let us know how is doing.  If he is not improving we will follow-up in clinic with further recommendations.    Total time of 30 minutes spent with the patient today in counseling and coordination of care.      Again, thank you for allowing me to participate in the care of your patient.        Sincerely,        Bob Parker PA-C

## 2021-06-04 NOTE — PROGRESS NOTES
Neurosurgery Consult    HPI    Mr. Bell is a 55-year-old male referred to us for evaluation of low back pain and left leg pain.  His symptoms have been present for about 4 weeks.  He did have a previous episode similar to this sometime in the past that resolved spontaneously.  He underwent a lumbar MRI recently which demonstrated a left L4-5 disc bulge.  He has tried an oral steroid pack which was not particularly helpful and has tried physical therapy which also did not help him.  He has not yet had an injection.  He denies any focal weakness in his left leg, he states his symptoms are intermittent but can be quite severe at times, he does say that his back pain is his major concern and leg pain is a secondary issue.  He denies any bowel or bladder symptoms or saddle anesthesia.    Medical history  Neck pain  Alcoholic cirrhosis of liver with ascites  Chronic kidney disease      Social history  Works in [employer]      B/P: 151/79, T: Data Unavailable, P: 73, R: 20       Exam    Alert and oriented no acute distress  Bilateral lower extremities with 5/5 strength  Reflexes absent right patella, 2+ left patella, 2+ bilateral ankle  Negative straight leg raise on the right, positive on the left  Negative ankle clonus negative Babinski bilaterally  Lumbar spine tender to palpation on the left  Able to stand on heels and toes  Gait is antalgic on the left    Imaging    Lumbar MRI demonstrates left L4-5 disc protrusion compressing the left L4 nerve in the lateral recess.    Assessment    Low back pain  Left leg radiculopathy    Plan:      I have sent the patient for an epidural steroid injection.  He will contact us a few weeks after the injection to let us know how is doing.  If he is not improving we will follow-up in clinic with further recommendations.    Total time of 30 minutes spent with the patient today in counseling and coordination of care.

## 2021-06-11 ENCOUNTER — TELEPHONE (OUTPATIENT)
Dept: NEUROSURGERY | Facility: CLINIC | Age: 56
End: 2021-06-11

## 2021-06-11 NOTE — TELEPHONE ENCOUNTER
VM left for pt to call back at earliest convenience. Will discuss request for additional cortisone injection.       Sue Alexis, RNCC  Neurology

## 2021-06-11 NOTE — TELEPHONE ENCOUNTER
[facility] Call Center    Phone Message    May a detailed message be left on voicemail: yes     Reason for Call: Patient calling to get another order for a cortisone injection. Please advise. Thank you    Action Taken: Message routed to:  Adult Clinics: Neurology p [id number]    Travel Screening: Not Applicable

## 2021-06-14 DIAGNOSIS — Z11.59 ENCOUNTER FOR SCREENING FOR OTHER VIRAL DISEASES: ICD-10-CM

## 2021-06-17 NOTE — TELEPHONE ENCOUNTER
Tried to reach the pt again to discuss his request but there was no answer. A message was left on his VM requesting a call back.  Lory Andujar RN   Neurology Care Coordinator

## 2021-06-23 ENCOUNTER — ANCILLARY PROCEDURE (OUTPATIENT)
Dept: ULTRASOUND IMAGING | Facility: CLINIC | Age: 56
End: 2021-06-23
Attending: INTERNAL MEDICINE
Payer: COMMERCIAL

## 2021-06-23 ENCOUNTER — PATIENT OUTREACH (OUTPATIENT)
Dept: GASTROENTEROLOGY | Facility: CLINIC | Age: 56
End: 2021-06-23

## 2021-06-23 DIAGNOSIS — K70.31 ALCOHOLIC CIRRHOSIS OF LIVER WITH ASCITES (H): ICD-10-CM

## 2021-06-23 DIAGNOSIS — K70.31 ALCOHOLIC CIRRHOSIS OF LIVER WITH ASCITES (H): Primary | ICD-10-CM

## 2021-06-23 LAB
ALBUMIN SERPL-MCNC: 2.7 G/DL (ref 3.4–5)
ALP SERPL-CCNC: 165 U/L (ref 40–150)
ALT SERPL W P-5'-P-CCNC: 19 U/L (ref 0–70)
ANION GAP SERPL CALCULATED.3IONS-SCNC: 6 MMOL/L (ref 3–14)
AST SERPL W P-5'-P-CCNC: 33 U/L (ref 0–45)
BILIRUB DIRECT SERPL-MCNC: 1.5 MG/DL (ref 0–0.2)
BILIRUB SERPL-MCNC: 3.6 MG/DL (ref 0.2–1.3)
BUN SERPL-MCNC: 8 MG/DL (ref 7–30)
CALCIUM SERPL-MCNC: 8.5 MG/DL (ref 8.5–10.1)
CHLORIDE SERPL-SCNC: 117 MMOL/L (ref 94–109)
CO2 SERPL-SCNC: 25 MMOL/L (ref 20–32)
CREAT SERPL-MCNC: 1.09 MG/DL (ref 0.66–1.25)
ERYTHROCYTE [DISTWIDTH] IN BLOOD BY AUTOMATED COUNT: 18.6 % (ref 10–15)
GFR SERPL CREATININE-BSD FRML MDRD: 75 ML/MIN/{1.73_M2}
GLUCOSE SERPL-MCNC: 77 MG/DL (ref 70–99)
HCT VFR BLD AUTO: 37.3 % (ref 40–53)
HGB BLD-MCNC: 12.1 G/DL (ref 13.3–17.7)
INR PPP: 1.79 (ref 0.86–1.14)
MCH RBC QN AUTO: 32 PG (ref 26.5–33)
MCHC RBC AUTO-ENTMCNC: 32.4 G/DL (ref 31.5–36.5)
MCV RBC AUTO: 99 FL (ref 78–100)
PLATELET # BLD AUTO: 58 10E9/L (ref 150–450)
POTASSIUM SERPL-SCNC: 4 MMOL/L (ref 3.4–5.3)
PROT SERPL-MCNC: 5.6 G/DL (ref 6.8–8.8)
RBC # BLD AUTO: 3.78 10E12/L (ref 4.4–5.9)
SODIUM SERPL-SCNC: 148 MMOL/L (ref 133–144)
WBC # BLD AUTO: 4.3 10E9/L (ref 4–11)

## 2021-06-23 PROCEDURE — 80048 BASIC METABOLIC PNL TOTAL CA: CPT | Performed by: PATHOLOGY

## 2021-06-23 PROCEDURE — 93975 VASCULAR STUDY: CPT | Mod: GC | Performed by: RADIOLOGY

## 2021-06-23 PROCEDURE — 85027 COMPLETE CBC AUTOMATED: CPT | Performed by: PATHOLOGY

## 2021-06-23 PROCEDURE — 36415 COLL VENOUS BLD VENIPUNCTURE: CPT | Performed by: PATHOLOGY

## 2021-06-23 PROCEDURE — 85610 PROTHROMBIN TIME: CPT | Performed by: PATHOLOGY

## 2021-06-23 PROCEDURE — 80076 HEPATIC FUNCTION PANEL: CPT | Performed by: PATHOLOGY

## 2021-06-23 NOTE — PROGRESS NOTES
Patient called to state he hasn't been feeling well lately and asked if he could get an ultrasound and lab work. Patient feels slightly distended, and has also been nauseous to the point that he hasn't been eating. Today he has only had water. He states he feels tired all the time, and generally feeling overall unwell. Okay per Dr. Bales to proceed with US and labs, scheduled today.   Addendum: Follow up scheduled with Dr. Tristan in IR to discuss TIPS revision.

## 2021-06-25 DIAGNOSIS — K70.31 ALCOHOLIC CIRRHOSIS OF LIVER WITH ASCITES (H): Primary | ICD-10-CM

## 2021-07-01 ENCOUNTER — RECORDS - HEALTHEAST (OUTPATIENT)
Dept: LAB | Facility: CLINIC | Age: 56
End: 2021-07-01

## 2021-07-01 ENCOUNTER — TRANSFERRED RECORDS (OUTPATIENT)
Dept: HEALTH INFORMATION MANAGEMENT | Facility: CLINIC | Age: 56
End: 2021-07-01

## 2021-07-01 LAB
APPEARANCE FLD: ABNORMAL
COLOR FLD: ABNORMAL
CRYSTALS SNV MICRO: NORMAL
EOSINOPHIL NFR FLD MANUAL: 1 %
LYMPHOCYTES NFR FLD MANUAL: 21 %
MACROPHAGE % - HISTORICAL: 4 %
MESOTHELIALS, FLUID: 14 %
MONOCYTE % - HISTORICAL: 48 %
NEUTS BAND NFR FLD MANUAL: 12 %
OTHER CELLS FLD MANUAL: ABNORMAL
RBC FLUID - HISTORICAL: ABNORMAL /UL
WBC # FLD AUTO: 210 /UL (ref 0–99)

## 2021-07-02 DIAGNOSIS — Z11.59 ENCOUNTER FOR SCREENING FOR OTHER VIRAL DISEASES: ICD-10-CM

## 2021-07-02 PROCEDURE — U0005 INFEC AGEN DETEC AMPLI PROBE: HCPCS | Performed by: PHYSICIAN ASSISTANT

## 2021-07-02 PROCEDURE — U0003 INFECTIOUS AGENT DETECTION BY NUCLEIC ACID (DNA OR RNA); SEVERE ACUTE RESPIRATORY SYNDROME CORONAVIRUS 2 (SARS-COV-2) (CORONAVIRUS DISEASE [COVID-19]), AMPLIFIED PROBE TECHNIQUE, MAKING USE OF HIGH THROUGHPUT TECHNOLOGIES AS DESCRIBED BY CMS-2020-01-R: HCPCS | Performed by: PHYSICIAN ASSISTANT

## 2021-07-03 LAB
B BURGDOR DNA SPEC QL NAA+PROBE: NOT DETECTED
SPECIMEN SOURCE: NORMAL

## 2021-07-04 LAB
BACTERIA SPEC CULT: NO GROWTH
BACTERIA SPEC CULT: NORMAL
GRAM STAIN RESULT: NORMAL
GRAM STAIN RESULT: NORMAL

## 2021-07-06 ENCOUNTER — ANCILLARY PROCEDURE (OUTPATIENT)
Dept: GENERAL RADIOLOGY | Facility: CLINIC | Age: 56
End: 2021-07-06
Attending: PHYSICIAN ASSISTANT
Payer: COMMERCIAL

## 2021-07-06 DIAGNOSIS — M54.42 ACUTE LEFT-SIDED LOW BACK PAIN WITH LEFT-SIDED SCIATICA: ICD-10-CM

## 2021-07-06 PROCEDURE — 62323 NJX INTERLAMINAR LMBR/SAC: CPT | Performed by: RADIOLOGY

## 2021-07-06 RX ORDER — METHYLPREDNISOLONE ACETATE 80 MG/ML
80 INJECTION, SUSPENSION INTRA-ARTICULAR; INTRALESIONAL; INTRAMUSCULAR; SOFT TISSUE ONCE
Status: COMPLETED | OUTPATIENT
Start: 2021-07-06 | End: 2021-07-06

## 2021-07-06 RX ORDER — IOPAMIDOL 408 MG/ML
10 INJECTION, SOLUTION INTRATHECAL ONCE
Status: COMPLETED | OUTPATIENT
Start: 2021-07-06 | End: 2021-07-06

## 2021-07-06 RX ORDER — BUPIVACAINE HYDROCHLORIDE 5 MG/ML
5 INJECTION, SOLUTION EPIDURAL; INTRACAUDAL ONCE
Status: COMPLETED | OUTPATIENT
Start: 2021-07-06 | End: 2021-07-06

## 2021-07-06 RX ADMIN — METHYLPREDNISOLONE ACETATE 80 MG: 80 INJECTION, SUSPENSION INTRA-ARTICULAR; INTRALESIONAL; INTRAMUSCULAR; SOFT TISSUE at 11:08

## 2021-07-06 RX ADMIN — BUPIVACAINE HYDROCHLORIDE 10 MG: 5 INJECTION, SOLUTION EPIDURAL; INTRACAUDAL at 11:10

## 2021-07-06 RX ADMIN — IOPAMIDOL 2 ML: 408 INJECTION, SOLUTION INTRATHECAL at 11:08

## 2021-07-09 DIAGNOSIS — Z11.59 ENCOUNTER FOR SCREENING FOR OTHER VIRAL DISEASES: ICD-10-CM

## 2021-07-12 ENCOUNTER — VIRTUAL VISIT (OUTPATIENT)
Dept: RADIOLOGY | Facility: CLINIC | Age: 56
End: 2021-07-12
Attending: RADIOLOGY
Payer: COMMERCIAL

## 2021-07-12 ENCOUNTER — TELEPHONE (OUTPATIENT)
Dept: VASCULAR SURGERY | Facility: CLINIC | Age: 56
End: 2021-07-12

## 2021-07-12 DIAGNOSIS — K70.31 ALCOHOLIC CIRRHOSIS OF LIVER WITH ASCITES (H): Primary | ICD-10-CM

## 2021-07-12 PROCEDURE — 999N001193 HC VIDEO/TELEPHONE VISIT; NO CHARGE

## 2021-07-12 PROCEDURE — 99214 OFFICE O/P EST MOD 30 MIN: CPT | Mod: GC | Performed by: RADIOLOGY

## 2021-07-12 NOTE — LETTER
7/12/2021         RE: Ivan Bell  [address] [address] [address]        Dear Colleague,    Thank you for referring your patient, Ivan Bell, to the [facility]. Please see a copy of my visit note below.    Ivan is a 55 year old who is being evaluated via a billable telephone visit.      What phone number would you like to be contacted at? [phone]    How would you like to obtain your AVS? Frances Valencia LPJONATAN         Interventional Radiology Clinic Note    S:  Mr. 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  Unfortunately, he had a EtOH relapse in 2/2019, but has continued to be sober since. He underwent a TIPS revision on 10/29/2019 for recurrent ascites, and hasn't needed a paracentesis since.  However, he does report he feels he's beginning to reaccumulate fluid. Has not had to have a paracentesis yet. He has no complaints otherwise and denies HE, jaundice, or GI bleeds.    O  No vitals taken on this phone visit  Patient sounds well on the phone.        CBC RESULTS: Recent Labs   Lab Test 06/23/21  1805   WBC 4.3   RBC 3.78*   HGB 12.1*   HCT 37.3*   MCV 99   MCH 32.0   MCHC 32.4   RDW 18.6*   PLT 58*       Last Comprehensive Metabolic Panel:  Sodium   Date Value Ref Range Status   06/23/2021 148 (H) 133 - 144 mmol/L Final     Potassium   Date Value Ref Range Status   06/23/2021 4.0 3.4 - 5.3 mmol/L Final     Chloride   Date Value Ref Range Status   06/23/2021 117 (H) 94 - 109 mmol/L Final     Carbon Dioxide   Date Value Ref Range Status   06/23/2021 25 20 - 32 mmol/L Final     Anion Gap   Date Value Ref Range Status   06/23/2021 6 3 - 14 mmol/L Final     Glucose   Date Value Ref Range Status   06/23/2021 77 70 - 99 mg/dL Final     Urea Nitrogen   Date Value Ref Range Status   06/23/2021 8 7 - 30 mg/dL Final     Creatinine   Date Value Ref Range Status   06/23/2021  1.09 0.66 - 1.25 mg/dL Final     GFR Estimate   Date Value Ref Range Status   06/23/2021 75 >60 mL/min/[1.73_m2] Final     Comment:     Non  GFR Calc  Starting 12/18/2018, serum creatinine based estimated GFR (eGFR) will be   calculated using the Chronic Kidney Disease Epidemiology Collaboration   (CKD-EPI) equation.       Calcium   Date Value Ref Range Status   06/23/2021 8.5 8.5 - 10.1 mg/dL Final     Bilirubin Total   Date Value Ref Range Status   06/23/2021 3.6 (H) 0.2 - 1.3 mg/dL Final     Alkaline Phosphatase   Date Value Ref Range Status   06/23/2021 165 (H) 40 - 150 U/L Final     ALT   Date Value Ref Range Status   06/23/2021 19 0 - 70 U/L Final     AST   Date Value Ref Range Status   06/23/2021 33 0 - 45 U/L Final     Imaging: TIPS US from 6/23/2021 was personally reviewed and interpreted.     TIPS is patent, although with continued elevated velocities at the hepatic venous end and mid stent. Moderate ascites.     A/P: Mr. Bell has had a very good response to his TIPS for refractory ascites, but now feels his fluid is reaccumulating. He has not had to have another paracentesis yet. However, US demonstrates increased velocities  in the TIPS.    -We will bring him in for a TIPS revision.     Jamie Worthington,  on 7/12/2021 at 1:14 PM      Telephone time: 12 minutes      I, Dr Jelani Tristan, was present with the fellow during the history and exam. I discussed the case with the fellow and agree with the findings as documented in the assessment and plan.              Again, thank you for allowing me to participate in the care of your patient.        Sincerely,        Jelani Tristan MD

## 2021-07-12 NOTE — PROGRESS NOTES
Ivan is a 55 year old who is being evaluated via a billable telephone visit.      What phone number would you like to be contacted at? [phone]    How would you like to obtain your AVS? Frances Valencia LPJONATAN         Interventional Radiology Clinic Note    S:  Mr. 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  Unfortunately, he had a EtOH relapse in 2/2019, but has continued to be sober since. He underwent a TIPS revision on 10/29/2019 for recurrent ascites, and hasn't needed a paracentesis since.  However, he does report he feels he's beginning to reaccumulate fluid. Has not had to have a paracentesis yet. He has no complaints otherwise and denies HE, jaundice, or GI bleeds.    O  No vitals taken on this phone visit  Patient sounds well on the phone.        CBC RESULTS: Recent Labs   Lab Test 06/23/21  1805   WBC 4.3   RBC 3.78*   HGB 12.1*   HCT 37.3*   MCV 99   MCH 32.0   MCHC 32.4   RDW 18.6*   PLT 58*       Last Comprehensive Metabolic Panel:  Sodium   Date Value Ref Range Status   06/23/2021 148 (H) 133 - 144 mmol/L Final     Potassium   Date Value Ref Range Status   06/23/2021 4.0 3.4 - 5.3 mmol/L Final     Chloride   Date Value Ref Range Status   06/23/2021 117 (H) 94 - 109 mmol/L Final     Carbon Dioxide   Date Value Ref Range Status   06/23/2021 25 20 - 32 mmol/L Final     Anion Gap   Date Value Ref Range Status   06/23/2021 6 3 - 14 mmol/L Final     Glucose   Date Value Ref Range Status   06/23/2021 77 70 - 99 mg/dL Final     Urea Nitrogen   Date Value Ref Range Status   06/23/2021 8 7 - 30 mg/dL Final     Creatinine   Date Value Ref Range Status   06/23/2021 1.09 0.66 - 1.25 mg/dL Final     GFR Estimate   Date Value Ref Range Status   06/23/2021 75 >60 mL/min/[1.73_m2] Final     Comment:     Non  GFR Calc  Starting 12/18/2018, serum creatinine based estimated GFR (eGFR) will be   calculated  using the Chronic Kidney Disease Epidemiology Collaboration   (CKD-EPI) equation.       Calcium   Date Value Ref Range Status   06/23/2021 8.5 8.5 - 10.1 mg/dL Final     Bilirubin Total   Date Value Ref Range Status   06/23/2021 3.6 (H) 0.2 - 1.3 mg/dL Final     Alkaline Phosphatase   Date Value Ref Range Status   06/23/2021 165 (H) 40 - 150 U/L Final     ALT   Date Value Ref Range Status   06/23/2021 19 0 - 70 U/L Final     AST   Date Value Ref Range Status   06/23/2021 33 0 - 45 U/L Final     Imaging: TIPS US from 6/23/2021 was personally reviewed and interpreted.     TIPS is patent, although with continued elevated velocities at the hepatic venous end and mid stent. Moderate ascites.     A/P: Mr. Bell has had a very good response to his TIPS for refractory ascites, but now feels his fluid is reaccumulating. He has not had to have another paracentesis yet. However, US demonstrates increased velocities  in the TIPS.    -We will bring him in for a TIPS revision.     Jamie Worthington DO on 7/12/2021 at 1:14 PM      Telephone time: 12 minutes      I, Dr Jelani Tristan, was present with the fellow during the history and exam. I discussed the case with the fellow and agree with the findings as documented in the assessment and plan.

## 2021-07-12 NOTE — TELEPHONE ENCOUNTER
Called pt and informed him that we have him scheduled for his tips revision procedure.     Left a msg for him.     Informed him that we have him scheduled for WEds 8/11.     He is to check Into gold at 830am for a 10am start time    All prep reviewed with him and will also send a msg to him via [employer].     Left direct line for him to return my call with questions.     Denise LAZARO RN, BSN  Interventional Radiology/Vascular  Nurse Coordinator   Phone: [phone]  Fax: [phone]

## 2021-07-20 VITALS — BODY MASS INDEX: 24.05 KG/M2 | HEIGHT: 70 IN | WEIGHT: 168 LBS

## 2021-07-20 NOTE — PROGRESS NOTES
Pharmacy Note - Admission Medication History  Pertinent Provider Information:    ______________________________________________________________________  Prior To Admission (PTA) med list completed and updated in EMR.     PTA Med List   Medication Sig Last Dose     acetaminophen (TYLENOL) 500 MG tablet Take 1,000 mg by mouth every 6 (six) hours as needed for pain. Past Week at Unknown time     ascorbic acid, vitamin C, (VITAMIN C) 500 MG tablet Take 500 mg by mouth daily. 2/18/2021 at Unknown time     cholecalciferol, vitamin D3, 1,000 unit (25 mcg) tablet Take 2,000 Units by mouth daily. 2/18/2021 at Unknown time     furosemide (LASIX) 40 MG tablet Take 40 mg by mouth daily. 2/18/2021 at Unknown time     lactulose (ENULOSE) 10 gram/15 mL (15 mL) Take 10 g by mouth 2 (two) times a day as needed.  Past Week at Unknown time     multivitamin with minerals (THERA-M) 9 mg iron-400 mcg Tab tablet Take 1 tablet by mouth daily. 2/18/2021 at Unknown time     potassium chloride (K-DUR,KLOR-CON) 20 MEQ tablet Take 40 mEq by mouth daily.  2/18/2021 at Unknown time     rifAXIMin (XIFAXAN) 550 mg Tab tablet Take 550 mg by mouth 2 (two) times a day. 2/18/2021 at pm     sertraline (ZOLOFT) 50 MG tablet Take 25 mg by mouth at bedtime.  2/18/2021 at pm     sildenafil (REVATIO) 20 mg tablet Take 20 mg by mouth daily as needed.      sodium bicarbonate 650 MG tablet Take 650 mg by mouth 2 (two) times a day. OTC product 2/18/2021 at Unknown time     spironolactone (ALDACTONE) 50 MG tablet Take 50 mg by mouth daily. 2/18/2021 at Unknown time     vitamin K2 100 mcg cap Take 200 mcg by mouth daily. 2/18/2021 at Unknown time                                                       Information source(s): Patient and CareEverywhere/SureScripts  Method of interview communication: in-person  Patient was asked about OTC/herbal products specifically.  PTA med list reflects this.  Based on the pharmacist s assessment, the PTA med list information  appears reliable  Allergies were reviewed, assessed, and updated with the patient.    Patient does not use any multi-dose medications prior to admission.   Thank you for the opportunity to participate in the care of this patient.    Kg Daniel, PharmD     2/19/2021     12:25 PM

## 2021-07-20 NOTE — ANESTHESIA PROCEDURE NOTES
Peripheral Block    Patient location during procedure: pre-op  Start time: 2/19/2021 1:29 PM  End time: 2/19/2021 1:31 PM  post-op analgesia per surgeon order as noted in medical record  Staffing:  Performing  Anesthesiologist: Devin Taylor MD  Preanesthetic Checklist  Completed: patient identified, site marked, risks, benefits, and alternatives discussed, timeout performed, consent obtained, airway assessed, oxygen available, suction available, emergency drugs available and hand hygiene performed  Peripheral Block  Block type: saphenous, adductor canal block  Prep: ChloraPrep  Patient position: supine  Patient monitoring: cardiac monitor, continuous pulse oximetry, heart rate and blood pressure  Laterality: right  Injection technique: ultrasound guided    Ultrasound used to visualize needle placement in proximity to nerve being blocked: yes   US used to visualize anesthetic spread  Visualized anatomic structures normal  No Pathological Findings  Permanent ultrasound image captured for medical record  Sterile gel and probe cover used for ultrasound.  Needle  Needle type: Stimuplex   Needle gauge: 20G  Needle length: 4 in  no peripheral nerve catheter placed  Assessment  Injection assessment: negative aspiration for heme, no paresthesia on injection, incremental injection and no difficulty with injection

## 2021-07-20 NOTE — PROGRESS NOTES
"   02/20/21 1005   Visit Specifics   Eval Type Inital eval   Inital OT Consult  02/20/21   Bed/Tabs/Pad Alarm Applied Yes   Treatment Time   ADL Training 30   General   Onset date 02/19/21   Chart Reviewed Yes   PT/OT Patient/Caregiver Stated Goals ok   Family/Caregiver Present No   Precautions   Weight Bearing Status wbat   Home Living   Type of Home House   Bathroom Shower/Tub Tub/shower unit   Bathroom Toilet Raised   Bathroom Equipment Toilet raiser;Vanity near toilet  (cupboard near toilet \"very sturdy\")   Mobility Equipment Walker-front wheeled;Cane  (per PT)   Prior Status   Independent With All ADL's/IADL's   Lives With Spouse   Receives Help From Family  (PRN)   Device Used No   ADL   Upper Body Dressing All upper body dressing   All Upper Body Dressing Modified independent (Device);Sitting   Lower Body Dressing Socks  (stated indep with donning pants)   Socks Modified independent (Device);Sitting   Kitchen/Functional Mobility SBA  (stated indep, educ in safety)   ADL Comments educ in safe tech.   Activity Tolerance   Endurance Good   Balance   Sitting Balance Independent   Standing Balance Independent;Standby;Dynamic;Supported   Bed Mobility   Bed Mobility Rolling;Supine Scoot   Rolling Moderately independent (Device)   Supine to Sit Modified Independent   Sit to Supine Modified Independent   Supine Scoot Modified independent (Device)   Bed mobility comments educ in safe tech   Functional Transfers   Functional Transfers Bed Transfers;Chair Transfers;Toilet Transfers;Tub Transfers;Car Transfers   Bed Transfers Modified independent   Chair Transfers Modified independent   Toilet Transfers Modified independent   Tub Transfers Modified independent   Car Transfers   (educ in safe tech)   Transfer Cues Technique;Correct hand placement;Safety;Used correct ortho technique;After OT intervention;RTS;Grab bars   Transfer Deficits Increased effort;Increased time;Fatigue   Ambulation   Location In room;To chair;To " bathroom;To bed;In clinic   Assistance Modified independent   Device Rolling Walker   Verbal Cues Safety   Weight Bearing Status WBAT   Cognition   Overall Cognitive Status WFL   Behavior    Behavior During Session Cooperative   RUE Assessment   RUE Assessment WFL   LUE Assessment   LUE Assessment WFL   Hand Function   Gross Grasp Functional   Gross Motor Coordination Functional   Fine Motor Coordination Functional   Assessment   Assessment Decreased ADL status;Decreased funtional mobility   Prognosis Good   Treatment/Interventions ADL training;Functional transfer training   OT Frequency Daily   Goal Formulation Patient   Recommendations   OT Discharge Recommendation Home with family support/assistance   OT Summary OT d/c   OT Care Plan REVIEWED DAILY Yes, goals remain appropriate   Handouts Given Precautions after TKA;Resuming Activities after TKA

## 2021-07-20 NOTE — OR NURSING
Bone cuts from total joint disposed of per hospital policy as surgeon does not need sent to pathology and patient did not wish return

## 2021-07-20 NOTE — PLAN OF CARE
Problem: Physical Therapy  Goal: PT Goals  Description: Patient will demonstrate the following by 2/20/21, in order to maximize independence with functional mobility to facilitate safe discharge:   -Sit<>stand with FWW assistive device, mod I  -Ambulate 150 feet with FWW assistive device, SBA  -Negotiate 2 stairs with 1 rails, SBA, in order to access home.   -Independent with Home Exercise Program for TKA exercise with handout    Goals entered on 2/20/2021 by Jen Gamino, PT        Outcome: Completed   Physical Therapy Discharge Summary    Date of PT Discharge: 2/20/21  Recommended Equipment: FWW  Discharge Destination: Home with assist  Discharge Comments: All goals met      2/20/2021 by Jen Gamino, PT

## 2021-07-20 NOTE — CONSULTS
INTERNAL MEDICINE CONSULT    Physician requesting consult: Dr. Landaverde   Reason for consult: alcoholic cirrhosis and chronic thrombocytopenia       Assessment and Plan:      S/p right unicompartmental arthroplasty knee medical: Postop day #0.    Pain and bowel management  DVT protocol per ortho  Encourage PT/OT   Encourage incentive spirometery  Monitor Hb level  IVF      Alcoholic liver cirrhosis  S/p TIPS   On spironolactone and Lasix, ordered with holding parameter from tomorrow to avoid postop hypotension  On Rifaximin, ordered  On vitamin K PO, on hold, not in hospital formulary        Chronic thrombocytopenia  Plt 62,   Monitor     H/o CKD stage 3   Metabolic acidosis    Avoid NSAIDS   On sodium bicarbonate, ordered   Monitor renal function     Depression/anxiety  On Zoloft, ordered       HPI:     Ivan Bell is a 55 y.o. old male with past history significant for alcoholic liver cirrhosis sp TIPS, chronic thrombocytopenia, CKD stage 3, depression/anxiety admitted postoperatively after he underwent knee surgery.  Tolerated procedure well   Patient is very drowsy under the influence of pain medication currently, not able to provide any history.    Medical History  There are no active non-hospital problems to display for this patient.    Past Medical History:   Diagnosis Date     Alcoholic cirrhosis of liver (H)      Arthritis      Chronic kidney disease      Depression      Gout      Hepatic encephalopathy (H)      History of transfusion      Hypertension      Nephrolithiasis      Thrombocytopenia (H)      There are no active problems to display for this patient.       Surgical History  He  has a past surgical history that includes Knee arthroscopy; Carpal tunnel release; Foot surgery; Nephrectomy transplanted organ; Rotator cuff repair; Hip surgery; Shoulder arthroscopy; TIPS procedure (11/2020); and Ankle surgery.     Past Surgical History:   Procedure Laterality Date     ANKLE SURGERY       CARPAL TUNNEL  RELEASE       FOOT SURGERY       HIP SURGERY       KNEE ARTHROSCOPY       NEPHRECTOMY TRANSPLANTED ORGAN      TKA     ROTATOR CUFF REPAIR       SHOULDER ARTHROSCOPY       TIPS PROCEDURE  11/2020       Allergies  Allergies   Allergen Reactions     Benadryl [Diphenhydramine Hcl] Other (See Comments)     delirium     Oxycodone Other (See Comments)     Delirium and constipation     Prednisone Other (See Comments)     Visual disturbance       Trazodone Other (See Comments)     Visual disturbance         Prior to Admission Medications   Medications Prior to Admission   Medication Sig Dispense Refill Last Dose     acetaminophen (TYLENOL) 500 MG tablet Take 1,000 mg by mouth every 6 (six) hours as needed for pain.   Past Week at Unknown time     ascorbic acid, vitamin C, (VITAMIN C) 500 MG tablet Take 500 mg by mouth daily.   2/18/2021 at Unknown time     cholecalciferol, vitamin D3, 1,000 unit (25 mcg) tablet Take 2,000 Units by mouth daily.   2/18/2021 at Unknown time     furosemide (LASIX) 40 MG tablet Take 40 mg by mouth daily.   2/18/2021 at Unknown time     lactulose (ENULOSE) 10 gram/15 mL (15 mL) Take 10 g by mouth 2 (two) times a day as needed.    Past Week at Unknown time     multivitamin with minerals (THERA-M) 9 mg iron-400 mcg Tab tablet Take 1 tablet by mouth daily.   2/18/2021 at Unknown time     potassium chloride (K-DUR,KLOR-CON) 20 MEQ tablet Take 40 mEq by mouth daily.    2/18/2021 at Unknown time     rifAXIMin (XIFAXAN) 550 mg Tab tablet Take 550 mg by mouth 2 (two) times a day.   2/18/2021 at pm     sertraline (ZOLOFT) 50 MG tablet Take 25 mg by mouth at bedtime.    2/18/2021 at pm     sildenafil (REVATIO) 20 mg tablet Take 20 mg by mouth daily as needed.        sodium bicarbonate 650 MG tablet Take 650 mg by mouth 2 (two) times a day. OTC product   2/18/2021 at Unknown time     spironolactone (ALDACTONE) 50 MG tablet Take 50 mg by mouth daily.   2/18/2021 at Unknown time     vitamin K2 100 mcg cap Take  "200 mcg by mouth daily.   2021 at Unknown time       Social History  Reviewed, and he  reports that he quit smoking about 26 years ago. He has never used smokeless tobacco. He reports previous alcohol use. He reports that he does not use drugs.  Social History     Tobacco Use     Smoking status: Former Smoker     Quit date:      Years since quittin.1     Smokeless tobacco: Never Used   Substance Use Topics     Alcohol use: Not Currently     Comment: sober 4 years       Family History  Reviewed, and family history is not on file.    Review Of Systems  As per admission HPI, all others reviewed and negative.     Physical Exam:  /62 (Patient Position: Semi-tyler)   Pulse 83   Temp 97.6  F (36.4  C) (Axillary)   Resp 17   Ht 5' 9.5\" (1.765 m)   Wt 168 lb (76.2 kg)   SpO2 95%   BMI 24.45 kg/m    General Appearance:  drowsy, not in any apparent distress   Head:  Normocephalic, without obvious abnormality   Eyes:  PERRL, conjunctiva/corneas clear   Throat: Oral mucosa moist   Neck: Supple,  no JVD   Lungs:   Clear to auscultation bilaterally, respirations unlabored   Chest Wall:  No tenderness   Heart:  Regular rate and rhythm, S1, S2 normal,no murmur   Abdomen:   Soft, non-tender, bowel sounds present,  no masses, no organomegaly   Extremities:  no edema   Skin: Skin color, texture, turgor normal, no rashes or lesions   Neurologic: Alert and oriented X 3, Moves all 4 extremities     Results:  Results for orders placed or performed during the hospital encounter of 21   Type and screen   Result Value Ref Range    ABORh A NEG     Antibody Screen Negative Negative   CBC   Result Value Ref Range    WBC 3.2 (L) 4.0 - 11.0 thou/uL    RBC 3.37 (L) 4.40 - 6.20 mill/uL    Hemoglobin 11.0 (L) 14.0 - 18.0 g/dL    Hematocrit 32.6 (L) 40.0 - 54.0 %    MCV 97 80 - 100 fL    MCH 32.6 27.0 - 34.0 pg    MCHC 33.7 32.0 - 36.0 g/dL    RDW 17.9 (H) 11.0 - 14.5 %    Platelets 61 (L) 140 - 440 thou/uL    MPV " 11.0 8.5 - 12.5 fL   Protime-INR   Result Value Ref Range    INR 1.70 (H) 0.90 - 1.10   Comprehensive Metabolic Panel   Result Value Ref Range    Sodium 139 136 - 145 mmol/L    Potassium 4.1 3.5 - 5.0 mmol/L    Chloride 111 (H) 98 - 107 mmol/L    CO2 20 (L) 22 - 31 mmol/L    Anion Gap, Calculation 8 5 - 18 mmol/L    Glucose 101 70 - 125 mg/dL    BUN 15 8 - 22 mg/dL    Creatinine 1.07 0.70 - 1.30 mg/dL    GFR MDRD Af Amer >60 >60 mL/min/1.73m2    GFR MDRD Non Af Amer >60 >60 mL/min/1.73m2    Bilirubin, Total 2.9 (H) 0.0 - 1.0 mg/dL    Calcium 8.4 (L) 8.5 - 10.5 mg/dL    Protein, Total 5.5 (L) 6.0 - 8.0 g/dL    Albumin 2.7 (L) 3.5 - 5.0 g/dL    Alkaline Phosphatase 111 45 - 120 U/L    AST 40 0 - 40 U/L    ALT 15 0 - 45 U/L   Platelet Product Information   Result Value Ref Range    Unit Type A Neg     Unit Number [id number]     Status Issued     Component Platelets     PRODUCT CODE V0400Q17     Issue Date and Time [id number]     Blood Type 0600     CODING SYSTEM [id number]    Plasma Product Information   Result Value Ref Range    Unit Type A Neg     Unit Number [id number]     Status Issued     Component FROZEN PLASMA     PRODUCT CODE C3011C06     Issue Date and Time [id number]     Blood Type 0600     CODING SYSTEM QLGU180    Plasma Product Information   Result Value Ref Range    Unit Type A Pos     Unit Number [id number]     Status Issued     Component FROZEN PLASMA     PRODUCT CODE H0488J67     Issue Date and Time [id number]     Blood Type 6200     CODING SYSTEM VBOM947    Platelet Count   Result Value Ref Range    Platelets 62 (L) 140 - 440 thou/uL   INR   Result Value Ref Range    INR 1.83 (H) 0.90 - 1.10   Platelet Product Information   Result Value Ref Range    Unit Type A Neg     Unit Number [id number]     Status Issued     Component Platelets     PRODUCT CODE S9230W24     Issue Date and Time [id number]     Blood Type 0600     CODING SYSTEM UILB633            VIVIANA Jean  [facility]  Internal Medicine    2/19/2021  7:30 PM

## 2021-07-20 NOTE — ANESTHESIA POSTPROCEDURE EVALUATION
Patient: Ivan Bell  Procedure(s):  RIGHT UNICOMPARTMENTAL ARTHROPLASTY KNEE MEDIAL (Right)  Anesthesia type: general    Patient location: PACU  Last vitals:   Vitals Value Taken Time   /59 02/19/21 1700   Temp 36.9  C (98.5  F) 02/19/21 1650   Pulse 91 02/19/21 1701   Resp 17 02/19/21 1701   SpO2 94 % 02/19/21 1701   Vitals shown include unvalidated device data.  Post vital signs: stable  Level of consciousness: awake and responds to simple questions  Post-anesthesia pain: pain controlled  Post-anesthesia nausea and vomiting: no  Pulmonary: unassisted, return to baseline  Cardiovascular: stable and blood pressure at baseline  Hydration: adequate  Anesthetic events: no    QCDR Measures:  ASA# 11 - Amber-op Cardiac Arrest: ASA11B - Patient did NOT experience unanticipated cardiac arrest  ASA# 12 - Amber-op Mortality Rate: ASA12B - Patient did NOT die  ASA# 13 - PACU Re-Intubation Rate: ASA13B - Patient did NOT require a new airway mgmt  ASA# 10 - Composite Anes Safety: ASA10A - No serious adverse event    Additional Notes:

## 2021-07-20 NOTE — PLAN OF CARE
Problem: Pain  Goal: Patient's pain/discomfort is manageable  2/20/2021 0405 by Kayleigh Minor, RN  Outcome: Progressing  2/20/2021 0035 by Kayleigh Minor, RN  Outcome: Progressing   Pt. Will have pain < or equal to 3.  Patient vital signs are at baseline:             Patient able to ambulate as they were prior to admission or with assist devices provided by therapies during their stay:  Yes  Patient MUST void prior to discharge:  Yes  Patient able to tolerate oral intake:  Yes  Pain has adequate pain control using Oral analgesics:  Yes

## 2021-07-20 NOTE — PLAN OF CARE
Problem: Pain  Goal: Patient's pain/discomfort is manageable  Outcome: Progressing   Pt. Will have pain < or equal to 3.  Patient vital signs are at baseline: Yes  Patient able to ambulate as they were prior to admission or with assist devices provided by therapies during their stay:  Yes  Patient MUST void prior to discharge:  Yes  Patient able to tolerate oral intake:  Yes  Pain has adequate pain control using Oral analgesics:  Yes

## 2021-07-20 NOTE — PROGRESS NOTES
Ortho Progress Note      Post-operative Day: 1 Day Post-Op    RIGHT UNICOMPARTMENTAL ARTHROPLASTY KNEE MEDIAL      Subjective:    Pain: mild  Chest pain, SOB:  No  Neuro:  No complaints of numbness or tingling    Objective:  Vital signs in last 24 hours  Temp:  [97.6  F (36.4  C)-99.6  F (37.6  C)] 97.7  F (36.5  C)  Heart Rate:  [] 83  Resp:  [16-38] 18  BP: (118-153)/(58-70) 127/60    Wound status: Dressing clean and dry  Circulation, motion and sensation:   Sensation intact to light touch and position.  No motor deficit  Calf tenderness: No calf tenderness, left or right    Pertinent Labs   Lab Results: personally reviewed.   Lab Results   Component Value Date    WBC 4.1 02/20/2021    HGB 9.6 (L) 02/20/2021    HCT 28.8 (L) 02/20/2021    MCV 98 02/20/2021    PLT 63 (L) 02/20/2021       Plan: Pain consult pending            Discharge home today    Report completed by:  Avi Joseph  Date: 2/20/2021  Time: 9:05 AM

## 2021-07-20 NOTE — PROGRESS NOTES
[facility] Daily Progress Note   Assessment/Plan:    S/p right unicompartmental arthroplasty knee medical: Postop day #1.    Pain and bowel management  DVT protocol per ortho, he is not on any anticoagulation as his plt count is low 63 and INR is 1.8.   Encourage PT/OT   Encourage incentive spirometery    Acute blood loss anemia  Hb dropped from 11 to 9.6 postop  Continue to monitor Hb level    Alcoholic liver cirrhosis  H/o hepatic encephalopathy   S/p TIPS   On spironolactone and Lasix, ordered with holding parameter to avoid postop hypotension  On Rifaximin, ordered  On lactulose prn, ordered    On vitamin K PO, on hold, not in hospital formulary          Chronic thrombocytopenia  Plt count stable in 60s   Monitor      H/o CKD stage 3   Metabolic acidosis  Cr stable     Avoid NSAIDS   On sodium bicarbonate, ordered   Monitor renal function      Depression/anxiety  On Zoloft, ordered        Anticipated discharge/disposition. Per ortho   Active Problems:    * No active hospital problems. *     LOS: 0 days     Subjective:  He is feeling very well, denies any complaints       docusate sodium  100 mg Oral BID     furosemide  40 mg Oral DAILY     polyethylene glycol  17 g Oral DAILY     potassium chloride  40 mEq Oral DAILY     rifAXIMin  550 mg Oral BID     senna-docusate  1 tablet Oral BID     sertraline  25 mg Oral QHS     sodium bicarbonate  650 mg Oral BID     sodium chloride  3 mL Intravenous Line Care     sodium chloride  3 mL Intracatheter Q8H     spironolactone  50 mg Oral DAILY       Objective:  Vital signs in last 24 hours:  Temp:  [97.6  F (36.4  C)-99.6  F (37.6  C)] 97.7  F (36.5  C)  Heart Rate:  [] 83  Resp:  [16-38] 18  BP: (118-153)/(58-70) 127/60  Weight:   168 lb (76.2 kg)    Intake/Output last 3 shifts:  I/O last 3 completed shifts:  In: 3905 [P.O.:460; I.V.:2200; Blood:1245]  Out: 700 [Urine:675; Blood:25]  Intake/Output this shift:  No intake/output data recorded.    Review of Systems:   As per  subjective, all others negative.    Physical Exam:    General Appearance:  Alert, cooperative, no distress   Head:  Normocephalic, atraumatic   Eyes:  PERRL    Throat:  mucosa; moist   Neck: No JVD, no LAP   Lungs:   Clear to auscultation bilaterally, respirations unlabored   Chest Wall:  No tenderness or deformity   Heart:  Regular rate and rhythm, S1, S2 normal,no murmur   Abdomen:   Soft, non tender, non distended, bowel sounds present, no guarding or rigidity, reducible umbilical hernia    Extremities: No edema   Skin: Skin color, texture, turgor normal, no rashes or lesions   Neurologic: Alert and oriented X 3, Moves all 4 extremities     Lab Results:  Personally Reviewed.   Lab Results   Component Value Date    WBC 4.1 02/20/2021    HGB 9.6 (L) 02/20/2021    HCT 28.8 (L) 02/20/2021    MCV 98 02/20/2021    PLT 63 (L) 02/20/2021     Lab Results   Component Value Date    CREATININE 1.05 02/20/2021    BUN 15 02/20/2021     02/20/2021    K 4.2 02/20/2021     (H) 02/20/2021    CO2 24 02/20/2021       Tameka Zuniga M.D  [facility] Service  Internal Medicine

## 2021-07-20 NOTE — PROGRESS NOTES
[facility] ACUTE PAIN SERVICE    ([facility], [facility], [facility], and [facility])   Chart Check    Date of Admission:  2/19/2021  Date of Consult: 02/20/21  Physician requesting consult: ORtho team   Reason for consult: liver cirrhosis , past delerium, post TKA     Assessment/Plan:     Ivan Bell is a 55 y.o. male who was admitted on 2/19/2021.  1 Day Post-Op. I was asked to see the patient for  Pain management in patient with liver cirrhosis, delerium in past. Admitted for planned TKA. History ofsignificant for alcoholic liver cirrhosis sp TIPS, chronic thrombocytopenia, CKD stage 3, depression/anxiety admitted postoperatively after he underwent knee surgery . Pain began when with surgery.  The patient does not smoke and does have ETOH chemical dependency history (last drink reported in 2016).   Delirium and constipation reported with previous oxycodone use.     Since surgery, patient has utilized 4mg of PO Dilaudid and 0.5mg of IV Dilaudid for an MME of 26  .Patient chart checked by acute pain team, doses and meds appear appropriate given his comorbidities and current pain levels. Patient pain levels reported as mild-moderate. No notes of delirium/confusion based on RN/MD assessment. PMT will see patient in person tomorrow if necessary, please call [phone] if patient needs to be seen today.    Addendum 1220 : spoke with DILIA Leroy who confirmed that chart check is appropriate for pateint today based on his condition. Recommend at discharge: Dilaudid 1-2mg PO TIDPRN x 3-5 days     PLAN:   1) Pain is consistent with post op pain. The patient's home MME was none mg daily. Agree with ortho.   2)Multimodal Medication Therapy  Topical: none, could add topical lidocaine alternating with diclofenac if needed  NSAID'S: none, scr 1.05, CKD stage 2.  Muscle Relaxants: none  Adjuvants:Vistaril 10mg q6hprn  Antidepressants/anxiolytics:sertraline 25mg qhs  Opioids: Dilaudid 2-4mg q4hprn  IV Pain  medication: Dilaudid 0.2-0.4 IV q2hprn  3)Non-medication interventions  Pharmacy consult- appreciate recommendations   Acupuncture consult- as available Mon and Friday     Integrative consult - called referral to [phone]   4)Constipation Prophylaxis: Docusate scheduled two times a day, Miralax daily  5) Follow up   -Opioid prescriber has been none  -Discharge Recommendations - We recommend prescribing the following at the time of discharge: see above       History of Present Illness (HPI):       Ivan Bell is a 55 y.o. old male .  The pain is reported to be acute on chronic, located in the knee, . Per MN  review. The patient has no opioid tolerance.  Review of medical record/Summary of labs and care everywhere. Looked at clinic note from 2/11 family medicine visit, Dr Cristopher Arriaga. This indicated that patient fit for surgery. Multiple prior surgeries.     Past pain treatments have included pain clinic: no,      Last UA:none     MN  pulled from system on 02/20/21. Last refill on June 2020. This indicated minimal opioid use. One tramadol Rx in June 2020.      Medical History  There are no active non-hospital problems to display for this patient.    Past Medical History:   Diagnosis Date     Alcoholic cirrhosis of liver (H)      Arthritis      Chronic kidney disease      Depression      Gout      Hepatic encephalopathy (H)      History of transfusion      Hypertension      Nephrolithiasis      Thrombocytopenia (H)      There are no active problems to display for this patient.       Surgical History  He  has a past surgical history that includes Knee arthroscopy; Carpal tunnel release; Foot surgery; Nephrectomy transplanted organ; Rotator cuff repair; Hip surgery; Shoulder arthroscopy; TIPS procedure (11/2020); and Ankle surgery.     Past Surgical History:   Procedure Laterality Date     ANKLE SURGERY       CARPAL TUNNEL RELEASE       FOOT SURGERY       HIP SURGERY       KNEE ARTHROSCOPY       NEPHRECTOMY  TRANSPLANTED ORGAN      TKA     ROTATOR CUFF REPAIR       SHOULDER ARTHROSCOPY       TIPS PROCEDURE  11/2020       Allergies  Allergies   Allergen Reactions     Benadryl [Diphenhydramine Hcl] Other (See Comments)     delirium     Oxycodone Other (See Comments)     Delirium and constipation     Prednisone Other (See Comments)     Visual disturbance       Trazodone Other (See Comments)     Visual disturbance         Prior to Admission Medications   Medications Prior to Admission   Medication Sig Dispense Refill Last Dose     acetaminophen (TYLENOL) 500 MG tablet Take 1,000 mg by mouth every 6 (six) hours as needed for pain.   Past Week at Unknown time     ascorbic acid, vitamin C, (VITAMIN C) 500 MG tablet Take 500 mg by mouth daily.   2/18/2021 at Unknown time     cholecalciferol, vitamin D3, 1,000 unit (25 mcg) tablet Take 2,000 Units by mouth daily.   2/18/2021 at Unknown time     furosemide (LASIX) 40 MG tablet Take 40 mg by mouth daily.   2/18/2021 at Unknown time     lactulose (ENULOSE) 10 gram/15 mL (15 mL) Take 10 g by mouth 2 (two) times a day as needed.    Past Week at Unknown time     multivitamin with minerals (THERA-M) 9 mg iron-400 mcg Tab tablet Take 1 tablet by mouth daily.   2/18/2021 at Unknown time     potassium chloride (K-DUR,KLOR-CON) 20 MEQ tablet Take 40 mEq by mouth daily.    2/18/2021 at Unknown time     rifAXIMin (XIFAXAN) 550 mg Tab tablet Take 550 mg by mouth 2 (two) times a day.   2/18/2021 at pm     sertraline (ZOLOFT) 50 MG tablet Take 25 mg by mouth at bedtime.    2/18/2021 at pm     sildenafil (REVATIO) 20 mg tablet Take 20 mg by mouth daily as needed.        sodium bicarbonate 650 MG tablet Take 650 mg by mouth 2 (two) times a day. OTC product   2/18/2021 at Unknown time     spironolactone (ALDACTONE) 50 MG tablet Take 50 mg by mouth daily.   2/18/2021 at Unknown time     vitamin K2 100 mcg cap Take 200 mcg by mouth daily.   2/18/2021 at Unknown time         Ani Taylor  PharmD  Acute Care Pain Management Program  [facility] ([place], [place], [place], [place])   With questions call [phone]  Preference if for Martin Mejias  Click HERE to page Mary

## 2021-07-20 NOTE — ANESTHESIA PROCEDURE NOTES
Peripheral Block    Patient location during procedure: pre-op  Start time: 2/19/2021 1:25 PM  End time: 2/19/2021 1:28 PM  post-op analgesia per surgeon order as noted in medical record  Staffing:  Performing  Anesthesiologist: Devin Taylor MD  Preanesthetic Checklist  Completed: patient identified, site marked, risks, benefits, and alternatives discussed, timeout performed, consent obtained, airway assessed, oxygen available, suction available, emergency drugs available and hand hygiene performed  Peripheral Block  Block type: other, tibial  Prep: ChloraPrep  Patient position: supine  Patient monitoring: cardiac monitor, continuous pulse oximetry, heart rate and blood pressure  Laterality: right  Injection technique: ultrasound guided    Ultrasound used to visualize needle placement in proximity to nerve being blocked: yes   US used to visualize anesthetic spread  Visualized anatomic structures normal  No Pathological Findings  Permanent ultrasound image captured for medical record  Sterile gel and probe cover used for ultrasound.  Needle  Needle type: Stimuplex   Needle gauge: 20G  Needle length: 4 in    Assessment  Injection assessment: no difficulty with injection, negative aspiration for heme, no paresthesia on injection and incremental injection

## 2021-07-20 NOTE — OP NOTE
Operative Report    PATIENT:  Ivan Bell    DATE OF SURGERY:  2/19/2021    SURGEON  José Miguel Landaverde MD.      FIRST ASSISTANT  Bhavesh Bradshaw PA-C  (Expert TU assist was required throughout for patient positioning, soft tissue retraction, appropriate use of knee instrumentation, and patient safety)     PREOPERATIVE DIAGNOSIS  right knee osteoarthritis     POSTOPERATIVE DIAGNOSIS  right knee osteoarthritis.         PROCEDURE  right knee medial unicompartmental arthroplasty.         ANESTHESIA  General    SPECIMENS: none         ESTIMATED BLOOD LOSS  25cc    INDICATIONS  Mr. Ivan Bell is a pleasant 55 y.o.-year-old male with an ongoing history of increasing and progressive pain in the right knee with increasing disability.  X-rays have shown bone-on-bone degenerative change in the medial compartment. Has tried and failed  conservative management.  After discussion regarding the risk and benefits of knee replacement patient elected to proceed.      IMPLANTS  1. Connor, PPK,  femoral component, size 5 .  2. Connor, PPK, tibial component, size G.  3. Connor, PPK,  all polyethylene articular surface 8 mm thickness.        PROCEDURE  Once consent was obtained and the operative site marked in the preop holding area, the patient was brought to the operating room.  Anesthesia was established without difficulty. All bony prominences and the non-operative leg were padded appropriately. The right leg was sterilely prepped and draped in the usual fashion after placement of a proximal tourniquet.       The limb was exsanguinated, the tourniquet inflated and a longitudinal incision made over the knee.  Dissection was carried down through the extensor mechanism.  A medial parapatellar arthrotomy  was performed.  The patella was luxed laterally and protected. Patellofemoral joint showed normal cartialge. Lateral side showed normal articular cartilage.     An intramedullary guide was used in the femur.  The distal  femoral was made at 4 degrees.  The femoral cutting block  was applied and the rest of the femoral cuts completed.      Attention was then turned to the tibia.  This was cut using an extramedullary tibial cutting guide perpendicular to its mechanical axis.  The trial tibial and femoral components were then placed and the knee reduced.     The knee was copiously irrigated and all bony surfaces dried.  Cement was mixed and all components were cemented and held until firm.  The knee was again trialed.  The  Polyethylene was opened and snapped into place, the locking mechanism was ensured.  The knee was copiously irrigated.    The extensor mechanism was closed with # 1 interrupted Vicryl suture. Deep dermis was closed layer-wise of # 2-0 interrupted inverted Vicryl sutures followed by running # 3-0 Monocryl in the skin.  Dressings were applied. The patient tolerated the procedure well and was returned to the postop recovery area in stable condition.          JOSÉ MIGUEL MELGAR MD    COMMENT:  José Miguel Melgar    COMMENT:  Provider, No Primary Care

## 2021-07-20 NOTE — PLAN OF CARE
"Pt was significantly sedated when he arrived from PACU.  He has a Hx of liver cirrhosis so he doesn't metabolize anaesthesia or other meds as quickly as most people.  Pt's wife called the floor to remind our staff of pt's liver condition & to please be aware & NOT over-medicate him for pain.  She further stated that pt will get \"goofy\" if hasn't had a BM in awhile.  (due to ammonia build up in his system). Pt used a walker & 1-2 assist and was able to ambulate to the restroom on a couple of occasions.  He voided a few hundred ml of fluid each time.  Pt did report some dizziness when ambulating, but refused to let us get a chair for him.  He states \"I'm bull-headed\" and determined to ambulate.  Pt's cms was as to be expected post op.    "

## 2021-07-20 NOTE — PLAN OF CARE
Patient vital signs are at baseline: Yes  Patient able to ambulate as they were prior to admission or with assist devices provided by therapies during their stay:  Yes  Patient MUST void prior to discharge:  Yes  Patient able to tolerate oral intake:  Yes  Pain has adequate pain control using Oral analgesics:  Yes    Problem: Pain  Goal: Patient's pain/discomfort is manageable  Outcome: Progressing     Problem: Safety  Goal: Patient will be injury free during hospitalization  Outcome: Progressing     Patient's pain managed with PRN PO Dilaudid. Participated in PT/OT during the morning. VSS. Bed alarms used for patient. Anticipated discharge this evening, wife at bedside.

## 2021-07-20 NOTE — PROGRESS NOTES
I am evaluating this patient for upcoming Right Unicompartmental Arthroplasty Knee Medial with Dr. Landaverde at [facility] on 2/19/21:    - Reviewed H&P, recent labs and most recent Hepatology visit notes. Patient has alcoholic liver cirrhosis with chronically elevated INR's (on low dose Vit K daily) and chronic thrombocytopenia. Most recent INR 1.48 and platelet count 52. These lab values appear to be in line with patient's recent baseline levels. Felt to be medically stable for knee surgery by PCP and Hepatologist. Notified Dr. Landaverde of these lab values to make sure he is ok proceeding- waiting for response.     - Update 2/17/21 at 2:30 pm: Dr. Landaverde ok proceeding with surgery but patient will need a platelet transfusion before surgery due to increased bleeding risk from thrombocytopenia, elevated INR and liver cirrhosis. Patient was called to come in to pre-op early at 0930 on 2/19/21 in order to receive 2 units of platelets prior to knee surgery at 1240 on 2/19/21. Spoke to blood bank, they will order 2 units of platelets. Orders for platelet transfusion and labs have been placed in Epic under signed and held pre-op orders. Dr. Landaverde updated. Please call me at number below with any questions or concerns. Full Treatment Plan Note to follow.     GILLIAN Correa, CNP   Advanced Practice Nurse Navigator- Orthopedics  [facility]   Office Phone: [phone]  Direct Fax: [phone]

## 2021-07-20 NOTE — PLAN OF CARE
Problem: Occupational Therapy  Goal: OT Goals  Description: Patient will demonstrate the following by t.d , in order to maximize independence with ADL/IADL performance:     -Patient will be mod I with dressing him/herself with AE as needed.    -Patient will be mod I with bed, chair, toilet, tub/shower, and room mobility.    Goals entered on 2/20/2021 by Suad Velez OT      Outcome: Completed   Occupational Therapy Discharge Summary    Date of OT Discharge: 2/20/2021   Refer to daily doc flowsheet for equipment issued and current functional status.  Discharge Destination: Home  Discharge Comments: Goals met.       2/20/2021 by Suad Velez OT     Include Location In Plan?: Yes Hide Include Location In Plan Question?: No Detail Level: Generalized

## 2021-07-20 NOTE — ANESTHESIA PREPROCEDURE EVALUATION
Anesthesia Evaluation      Patient summary reviewed   No history of anesthetic complications     Airway   Mallampati: II  Neck ROM: full   Pulmonary - negative ROS    breath sounds clear to auscultation                         Cardiovascular   (+) hypertension, ,     Rhythm: regular  Rate: normal,         Neuro/Psych    (+) depression,     Endo/Other    (-) no obesity     GI/Hepatic/Renal    (+)   chronic renal disease CRI, impaired hepatic function (cirrhosis, low platelets)          Dental - normal exam                        Anesthesia Plan  Planned anesthetic: general LMA and peripheral nerve block  Adductor and selective tibial nerve blocks requested by surgeon for post op pain control.  Platelets and FFP ordered by surgeon to be given in pre-op.  Ketamine and magnesium.  ASA 3   Induction: intravenous   Anesthetic plan and risks discussed with: patient    Post-op plan: routine recovery

## 2021-07-20 NOTE — PROGRESS NOTES
02/20/21 1000   Visit Specifics   Eval Type Initial eval   Inital PT Consult 02/20/21   Bed/Tabs/Pad Alarm Applied Other (Comment)  (hand off to OT)   General   Onset date 02/19/21   Chart Reviewed Yes   PT/OT Patient/Caregiver Stated Goals None Stated   Family/Caregiver Present No   Treatment Time   Gait Training 15   Theraputic Exercise 10   Precautions   Weight Bearing Status wbat   Home Living   Type of Home House   Home Layout Stairs to enter with rails;Able to live on main level with bedroom/bathroom  (2 steps to enter)   Mobility Equipment Walker-front wheeled;Cane   Prior Status   Independent With Functional mobility;Stairs/Steps;Without device   Lives With Spouse   Device Used No   Cognition   Overall Cognitive Status WFL   LLE Assessment   LLE Assessment WFL   Bed Mobility   Supine to Sit Independent   Transfer    Sit To Stand Stand by assistance;Verbal cues   Stand To Sit Modified independent (Device)   Transfer Comments verbal cueing for hand placement initially   Ambulation    Weight Bearing Status wbat   Distance (ft)  125x2   Assistance Close supervision;Distant supervision;Chair follow;Minimal verbal cues   Assistive Device Rolling walker   Verbal Cues Safety;Posture   Pattern Step through;Decreased pace   Stairs   Stairs Number Of Steps;Assistance;Rails;Device;Pattern;Comment   Number Of Steps 6   Assistance Close supervision;Minimal verbal cues   Rails Right   Device No device   Pattern Non-reciprocal   Comment Safe technique, patient reports confidence in negotiating stairs at home   Plan   Treatment/Interventions Functional transfer training;Gait/stair training;Home exercise program   PT Frequency Daily   Assessment   Prognosis Good   Problem List Decreased range of motion;Decreased mobility;Pain   Barriers to Discharge None   Recommendation   PT Discharge Recommendation Home with assist;Outpatient PT   PT Equipment Recommendation Rolling walker / FWW   PT Care Plan REVIEWED DAILY Yes, goals  remain appropriate   PT Handouts Given HEP

## 2021-07-20 NOTE — TREATMENT PLAN
Orthopedic Surgery Pre-Op Plan: Ivan Bell  pre-op review. This is NOT an H&P   Surgeon: Dr. Landaverde   Hospital: [facility]   Name of Surgery: Right Unicompartmental Arthroplasty Knee Medial   Date of Surgery: 2/19/21  H&P: Completed 2/11/21 by Cristopher Arriaga PA-C at [facility]  History of ASA, NSAIDS, vitamin and/or herbal supplements within 10 days: Yes- Multivitamin- instructed to hold this for 1 week before surgery.   History of blood thinners: No    Plan:   1) Discharge Plan: Home with assist of Spouse. Please see Discharge Planning section near bottom of this note for further details.     2) Alcoholic Cirrhosis of Liver with Ascites: Follows closely with Dr. Nii Miramontes at [facility]. Reviewed recent visit note from Dr. Nii Miramontes from 2/1/21: history of hepatic encephalopathy well-controlled on current medications. History of ascites- last paracentesis in Nov 2020. Underwent TIPS revision 11/25/20- no evidence of ascites following TIPS revision. No recent ED visits or hospital admissions. On lactulose and rifaximin. Will continue on diuretics and low sodium diet for lower extremity edema. Recent hepatic labs appear stable within patient's recent baseline. Will re-check on day of surgery with CMP. Felt to be optimized from Hepatology standpoint for surgery however has history of hepatic encephalopathy and delirium. Will need judicious use of pain medications and close monitoring. Will order Inpatient Pain Team Consult to assist given history. Would recommend implementing delirium protocol as needed.     3) Chronic Thrombocytopenia: Moderate: appears chronic and related to liver disease. Baseline platelet counts over past 2 years range from 50-88. Most recent platelet count 52 on 2/11/21. Dr. Landaverde notified and is ok proceeding with surgery but patient will need a platelet transfusion before surgery due to bleeding risk. Patient contacted and will come in early at 0930  on 2/19/21 for platelet transfusion. Orders for transfusion of 2 units platelets and labs have been placed in Epic under signed/held pre-op orders. Blood bank contacted and they will order platelets so they are here by 2/19/21. Will re-check CBC on 2/19/21 after transfusion but before surgery.      4) Elevated INR: Most recent INR 1.48 on 2/11/21. Related to liver disease. Dr. Landaverde notified and is ok proceeding as long as patient receives platelet transfusion. Will re-check INR on day of surgery. On low dose Vit. K daily. Denies history of previous problems with bleeding. Previous blood transfusion 2 years ago. Will get blood type & screen on day of surgery as patient is at increased bleeding risk due to liver disease.     5) Chronic Anemia and Leukopenia: Mild: Most recent hemoglobin 11.8 and WBC 3.0 on 2/11/21. Related to liver disease and appears stable. Should not affect proceeding with surgery at these levels. Will re-check CBC on day of surgery and recommend close post-op monitoring.     6) Hypertension: appears well-controlled on furosemide and spironolactone.     7) Depression: on sertraline.     8) Chronic Kidney Disease-Stage 2: Creatinine 1.38 and GFR 56 on 2/11/21. Appears stable. Will re-check CMP on day of surgery. I recommend avoiding nephrotoxins like NSAIDS, promoting good post-op hydration and monitoring kidney function closely.      Patient has increased risks with surgery particularly related to bleeding with his liver disease but appears stable for surgery as long as he receives platelet transfusion prior to surgery. I would recommend Hospitalist Consult to assist with medical management. Please call me below with any questions on this patient.       Review of Systems Notable for: Alcoholic Liver Cirrhosis with Ascites, Hx of Hepatic Encephalopathy and Delirium, Chronic Thrombocytopenia, Elevated INR, Chronic Anemia and Leukopenia, HTN, Depression, CKD-Stage 2.     Past Medical History:   Past  Medical History:   Diagnosis Date     Alcoholic cirrhosis of liver (H)      Arthritis     OA     Chronic kidney disease     CRF     Depression      Gout      Hepatic encephalopathy (H)     alcohol related     History of transfusion      Hypertension      Nephrolithiasis      Thrombocytopenia (H)      Past Surgical History:   Procedure Laterality Date     ANKLE SURGERY       CARPAL TUNNEL RELEASE       FOOT SURGERY       HIP SURGERY       KNEE ARTHROSCOPY       NEPHRECTOMY TRANSPLANTED ORGAN      TKA     ROTATOR CUFF REPAIR       SHOULDER ARTHROSCOPY       TIPS PROCEDURE  11/2020       Current Medications:  Patient's Medications   New Prescriptions    No medications on file   Previous Medications    ACETAMINOPHEN (TYLENOL) 500 MG TABLET    Take 1,000 mg by mouth every 6 (six) hours as needed for pain.    ASCORBIC ACID (VITAMIN C ORAL)    Take by mouth daily.    CHOLECALCIFEROL, VITAMIN D3, (VITAMIN D3 ORAL)    Take 2,000 Units by mouth daily.    FEXOFENADINE (ALLEGRA ALLERGY) 60 MG TABLET    Take 60 mg by mouth daily.    FUROSEMIDE (LASIX) 40 MG TABLET    Take 40 mg by mouth daily.    LACTULOSE (ENULOSE) 10 GRAM/15 ML (15 ML)    Take 10 g by mouth daily as needed.    MULTIVITAMIN ORAL    Take by mouth daily.    POTASSIUM CHLORIDE (K-DUR,KLOR-CON) 20 MEQ TABLET    Take 20 mEq by mouth daily. x2 tablets    RIFAXIMIN (XIFAXAN) 550 MG TAB TABLET    Take 550 mg by mouth 2 (two) times a day.    SENNA-DOCUSATE (SENNOSIDES-DOCUSATE SODIUM) 8.6-50 MG TABLET    Take 1 tablet by mouth daily.    SERTRALINE (ZOLOFT) 50 MG TABLET    Take 50 mg by mouth daily.    SILDENAFIL (REVATIO) 20 MG TABLET    Take 20 mg by mouth daily as needed.    SODIUM BICARBONATE ORAL    Take 650 mg by mouth 2 (two) times a day.    SPIRONOLACTONE (ALDACTONE) 50 MG TABLET    Take 50 mg by mouth daily.    VITAMIN K2 ORAL    Take 200 mcg by mouth daily.   Modified Medications    No medications on file   Discontinued Medications    TRAZODONE (DESYREL) 50 MG  TABLET    Take 50 mg by mouth at bedtime.       ALLERGIES:  Allergies   Allergen Reactions     Benadryl [Diphenhydramine Hcl] Other (See Comments)     delirium     Oxycodone Other (See Comments)     Delirium and constipation     Prednisone Other (See Comments)     Visual disturbance       Trazodone Other (See Comments)     Visual disturbance         Social History  Social History     Tobacco Use     Smoking status: Former Smoker     Quit date:      Years since quittin.1     Smokeless tobacco: Never Used   Substance Use Topics     Alcohol use: Not Currently     Comment: sober 4 years     Drug use: Never       Any Abnormal Recent Diagnostics? yes  INR 1.48 on 21: due to liver disease. On low dose Vit K. Dr. Landaverde notified and ok proceeding but patient will receive platelet transfusion prior to surgery.  Hemoglobin 11.8, WBC 3.0, Platelet Count 52 on 21: Ok to proceed per Dr. Landaverde but will need platelet transfusion prior to surgery due to increased bleeding risk.  Creatinine 1.38, GFR 56 on 21: CKD-Stage 2  Bilirubin Direct: 1.2, Bilirubin Total: 2.4, Albumin: 2.5, Protein Total: 5.8 on 21: alcoholic liver cirrhosis and these values are in line with recent labs. Will re-check CMP on day of surgery.     Discharge Planning:   Discharge plan according to [facility]:        21 0001   Discharge Planning   Patient expects to be discharged to: Home   Living Arrangements Spouse/significant other   Type of Residence Private residence   Do you have stairs? Yes  (exterior stairs 2)   Do the stairs have railings? No   What does your bathroom have? Tub/shower combination   Support Systems Spouse/significant other                  GILLIAN Correa, CNP   Advanced Practice Nurse Navigator- Orthopedics  [facility]   Phone: [phone]

## 2021-07-20 NOTE — CONSULTS
Care Management Initial Consult    General Information:  Patient's communication limitations:  None  Level of Orientation: A & O x 3     Advance Care Planning: Patient does not have advance directive        Living Environment:   People in home/Living arrangements: Spouse/significant other  Current residence:  Private residence      Family/Social Support:  Care provided by/ Primary Caregiver: Self immediate family  Provides care for someone: No  Description of Support System: Spouse/significant other     Lifestyle & Psychosocial Needs:        Socioeconomic History     Marital status:      Spouse name: Not on file     Number of children: Not on file     Years of education: Not on file     Highest education level: Not on file     Tobacco Use     Smoking status: Former Smoker     Quit date:      Years since quittin.1     Smokeless tobacco: Never Used   Substance and Sexual Activity     Alcohol use: Not Currently     Comment: sober 4 years     Drug use: Never       Functional Status:  Prior to admission ADL limits: No      Current Resources:   Skilled Home Care Services:  None  Community Resources: None  Equipment currently used at home:    Supplies currently used at home: None      Additional Information:  SW met with pt and wife for initial assessment. Pt reports understanding and agreement with plan for outpatient PT after the hospital. Pt reports that he does not have appointments scheduled but will plan to go to [facility] in [place]. He reports that he has the phone number and will plan to call to schedule appointments. Pt reports having transportation. Pt and wife deny any other needs at this time.      FER Sampson

## 2021-07-20 NOTE — PROGRESS NOTES
Discharge plan according to [facility]:       01/05/21 0001   Discharge Planning   Patient expects to be discharged to: Home   Living Arrangements Spouse/significant other   Type of Residence Private residence   Do you have stairs? Yes  (exterior stairs 2)   Do the stairs have railings? No   What does your bathroom have? Tub/shower combination   Support Systems Spouse/significant other

## 2021-07-20 NOTE — ANESTHESIA CARE TRANSFER NOTE
Last vitals:   Vitals:    02/19/21 1616   BP: 143/66   Pulse: 97   Resp: 16   Temp: 37.6  C (99.6  F)   SpO2: 99%     Patient's level of consciousness is drowsy  Spontaneous respirations: yes  Maintains airway independently: yes  Dentition unchanged: yes  Oropharynx: oropharynx clear of all foreign objects    QCDR Measures:  ASA# 20 - Surgical Safety Checklist: WHO surgical safety checklist completed prior to induction    PQRS# 430 - Adult PONV Prevention: 4558F - Pt received => 2 anti-emetic agents (different classes) preop & intraop  ASA# 8 - Peds PONV Prevention: NA - Not pediatric patient, not GA or 2 or more risk factors NOT present  PQRS# 424 - Amber-op Temp Management: 4559F - At least one body temp DOCUMENTED => 35.5C or 95.9F within required timeframe  PQRS# 426 - PACU Transfer Protocol: - Transfer of care checklist used  ASA# 14 - Acute Post-op Pain: ASA14B - Patient did NOT experience pain >= 7 out of 10

## 2021-07-23 ENCOUNTER — PATIENT OUTREACH (OUTPATIENT)
Dept: GASTROENTEROLOGY | Facility: CLINIC | Age: 56
End: 2021-07-23

## 2021-07-23 DIAGNOSIS — K70.31 ALCOHOLIC CIRRHOSIS OF LIVER WITH ASCITES (H): Primary | ICD-10-CM

## 2021-07-23 NOTE — PROGRESS NOTES
Patient states his abdomen is firm, distended and uncomfortable. He states he is need of a paracentesis today. Unfortunately the next available paracentesis found was not until next week. Patient states he is unable to wait that long. He had hoped to put this off until his TIPS revision in August, but the fluid buildup has become too much this week. Orders for para were faxed to [facility] for any future needs. Today patient will plan to go to ER.

## 2021-08-02 ENCOUNTER — PATIENT OUTREACH (OUTPATIENT)
Dept: GASTROENTEROLOGY | Facility: CLINIC | Age: 56
End: 2021-08-02

## 2021-08-02 ENCOUNTER — TRANSFERRED RECORDS (OUTPATIENT)
Dept: HEALTH INFORMATION MANAGEMENT | Facility: CLINIC | Age: 56
End: 2021-08-02

## 2021-08-02 DIAGNOSIS — K70.31 ALCOHOLIC CIRRHOSIS OF LIVER WITH ASCITES (H): Primary | ICD-10-CM

## 2021-08-02 NOTE — PROGRESS NOTES
Patient called with complaints that his umbilical hernia was very painful. He states it has been sticking out more and more and he is not able to reduce the area into his abdomen when he lays on his back. He was able to do this in the past, but not in recent days. Instructed patient to be seen today in the ER for concern of incarcerated hernia. He is agreeable and states he will go to the [facility], as it is close by.

## 2021-08-05 ENCOUNTER — LAB (OUTPATIENT)
Dept: LAB | Facility: CLINIC | Age: 56
End: 2021-08-05
Attending: RADIOLOGY
Payer: COMMERCIAL

## 2021-08-05 DIAGNOSIS — K76.6 PORTAL HYPERTENSION (H): ICD-10-CM

## 2021-08-05 DIAGNOSIS — K70.31 ALCOHOLIC CIRRHOSIS OF LIVER WITH ASCITES (H): ICD-10-CM

## 2021-08-05 DIAGNOSIS — Z11.59 ENCOUNTER FOR SCREENING FOR OTHER VIRAL DISEASES: ICD-10-CM

## 2021-08-05 LAB — SARS-COV-2 RNA RESP QL NAA+PROBE: NEGATIVE

## 2021-08-05 PROCEDURE — U0005 INFEC AGEN DETEC AMPLI PROBE: HCPCS | Performed by: PATHOLOGY

## 2021-08-05 PROCEDURE — U0003 INFECTIOUS AGENT DETECTION BY NUCLEIC ACID (DNA OR RNA); SEVERE ACUTE RESPIRATORY SYNDROME CORONAVIRUS 2 (SARS-COV-2) (CORONAVIRUS DISEASE [COVID-19]), AMPLIFIED PROBE TECHNIQUE, MAKING USE OF HIGH THROUGHPUT TECHNOLOGIES AS DESCRIBED BY CMS-2020-01-R: HCPCS | Performed by: PATHOLOGY

## 2021-08-05 NOTE — PROGRESS NOTES
Patient was seen at [facility] to evaluate irreducible umbilical hernia. CT scan done which showed large volume ascites which extends into the umbilical hernia, and did not show an occlusion of bowel in the area. ER MD was also unable to reduce the hernia, and it was recommended he schedule another paracentesis.  Patient has para this afternoon at [facility]. He is open to diuretic increase if needed. He also asked for help in scheduling CD evaluation, message sent to  per FER.

## 2021-08-06 ENCOUNTER — TELEPHONE (OUTPATIENT)
Dept: INTERVENTIONAL RADIOLOGY/VASCULAR | Facility: CLINIC | Age: 56
End: 2021-08-06

## 2021-08-06 ENCOUNTER — MYC MEDICAL ADVICE (OUTPATIENT)
Dept: GASTROENTEROLOGY | Facility: CLINIC | Age: 56
End: 2021-08-06

## 2021-08-06 DIAGNOSIS — Z11.59 ENCOUNTER FOR SCREENING FOR OTHER VIRAL DISEASES: Primary | ICD-10-CM

## 2021-08-06 NOTE — PROGRESS NOTES
MRI ordered, no adjustment to diuretics at this time per Celestino Verduzco PA-C. CD eval has been scheduled. This information was left in a phone message and also sent via My Chart.    ASCITES: Yes  - History of SBP: No  - Meets criteria for SBP ppx: No  [History of SBP and/or ascites total protein < 1.5g/dL AND either SCr  >1.2 , Na <130 or TB>3]

## 2021-08-09 ENCOUNTER — PATIENT OUTREACH (OUTPATIENT)
Dept: GASTROENTEROLOGY | Facility: CLINIC | Age: 56
End: 2021-08-09

## 2021-08-09 DIAGNOSIS — N25.89 RENAL TUBULAR ACIDOSIS: ICD-10-CM

## 2021-08-09 DIAGNOSIS — K70.31 ALCOHOLIC CIRRHOSIS OF LIVER WITH ASCITES (H): Primary | ICD-10-CM

## 2021-08-09 RX ORDER — EPLERENONE 50 MG/1
50 TABLET, FILM COATED ORAL DAILY
Qty: 30 TABLET | Refills: 0 | Status: SHIPPED | OUTPATIENT
Start: 2021-08-09 | End: 2021-09-08

## 2021-08-09 NOTE — PROGRESS NOTES
"Patient called to report he never was able to reduce the hernia after his paracentesis last Wednesday. He noted some decrease in pressure, but overall states \"not much.\" continues to have bowel movements and pass gas, but states whenever he eats the hernia pain increases. He really didn't get a good explanation of what to watch for or when to return to the ED. He has the TIPS revision scheduled in 2 days on 8/11, and thinks maybe he can wait that long. Reviewed symptoms of emergency including increased pain, no stools, nausea/vomiting, increased size, redness or leakage at the site. Per Celestino DURAN, increase lasix to 60 mg daily, begin eplerenone 50 mg daily, and to maintain good hydration. Patient agreeable to plan.  "

## 2021-08-10 RX ORDER — SODIUM BICARBONATE 650 MG/1
650 TABLET ORAL 2 TIMES DAILY
Qty: 180 TABLET | Refills: 3 | Status: SHIPPED | OUTPATIENT
Start: 2021-08-10 | End: 2022-08-25

## 2021-08-11 ENCOUNTER — HOSPITAL ENCOUNTER (OUTPATIENT)
Facility: CLINIC | Age: 56
Discharge: HOME | End: 2021-08-11
Attending: RADIOLOGY | Admitting: RADIOLOGY
Payer: COMMERCIAL

## 2021-08-11 ENCOUNTER — APPOINTMENT (OUTPATIENT)
Dept: MEDSURG UNIT | Facility: CLINIC | Age: 56
End: 2021-08-11
Attending: RADIOLOGY
Payer: COMMERCIAL

## 2021-08-11 ENCOUNTER — APPOINTMENT (OUTPATIENT)
Dept: INTERVENTIONAL RADIOLOGY/VASCULAR | Facility: CLINIC | Age: 56
End: 2021-08-11
Attending: RADIOLOGY
Payer: COMMERCIAL

## 2021-08-11 VITALS
BODY MASS INDEX: 24.34 KG/M2 | HEART RATE: 72 BPM | OXYGEN SATURATION: 98 % | RESPIRATION RATE: 14 BRPM | DIASTOLIC BLOOD PRESSURE: 74 MMHG | SYSTOLIC BLOOD PRESSURE: 138 MMHG | WEIGHT: 170 LBS | TEMPERATURE: 97.6 F | HEIGHT: 70 IN

## 2021-08-11 DIAGNOSIS — K70.31 ALCOHOLIC CIRRHOSIS OF LIVER WITH ASCITES (H): ICD-10-CM

## 2021-08-11 LAB
ALBUMIN SERPL-MCNC: 2.3 G/DL (ref 3.4–5)
ALP SERPL-CCNC: 124 U/L (ref 40–150)
ALT SERPL W P-5'-P-CCNC: 22 U/L (ref 0–70)
ANION GAP SERPL CALCULATED.3IONS-SCNC: 6 MMOL/L (ref 3–14)
APTT PPP: 36 SECONDS (ref 22–38)
AST SERPL W P-5'-P-CCNC: 30 U/L (ref 0–45)
BILIRUB DIRECT SERPL-MCNC: 1.2 MG/DL (ref 0–0.2)
BILIRUB SERPL-MCNC: 3 MG/DL (ref 0.2–1.3)
BUN SERPL-MCNC: 14 MG/DL (ref 7–30)
CALCIUM SERPL-MCNC: 8 MG/DL (ref 8.5–10.1)
CHLORIDE BLD-SCNC: 116 MMOL/L (ref 94–109)
CO2 SERPL-SCNC: 23 MMOL/L (ref 20–32)
CREAT SERPL-MCNC: 1.16 MG/DL (ref 0.66–1.25)
ERYTHROCYTE [DISTWIDTH] IN BLOOD BY AUTOMATED COUNT: 17.8 % (ref 10–15)
GFR SERPL CREATININE-BSD FRML MDRD: 70 ML/MIN/1.73M2
GLUCOSE BLD-MCNC: 93 MG/DL (ref 70–99)
HCT VFR BLD AUTO: 41 % (ref 40–53)
HGB BLD-MCNC: 13.4 G/DL (ref 13.3–17.7)
INR PPP: 1.67 (ref 0.85–1.15)
MCH RBC QN AUTO: 33.4 PG (ref 26.5–33)
MCHC RBC AUTO-ENTMCNC: 32.7 G/DL (ref 31.5–36.5)
MCV RBC AUTO: 102 FL (ref 78–100)
PLATELET # BLD AUTO: 67 10E3/UL (ref 150–450)
POTASSIUM BLD-SCNC: 3.2 MMOL/L (ref 3.4–5.3)
PROT SERPL-MCNC: 5.6 G/DL (ref 6.8–8.8)
RBC # BLD AUTO: 4.01 10E6/UL (ref 4.4–5.9)
SODIUM SERPL-SCNC: 145 MMOL/L (ref 133–144)
WBC # BLD AUTO: 5.6 10E3/UL (ref 4–11)

## 2021-08-11 PROCEDURE — 99152 MOD SED SAME PHYS/QHP 5/>YRS: CPT

## 2021-08-11 PROCEDURE — 82248 BILIRUBIN DIRECT: CPT | Performed by: NURSE PRACTITIONER

## 2021-08-11 PROCEDURE — 250N000011 HC RX IP 250 OP 636: Performed by: RADIOLOGY

## 2021-08-11 PROCEDURE — C1887 CATHETER, GUIDING: HCPCS

## 2021-08-11 PROCEDURE — 85730 THROMBOPLASTIN TIME PARTIAL: CPT | Performed by: NURSE PRACTITIONER

## 2021-08-11 PROCEDURE — 272N000302 HC DEVICE INFLATION CR5

## 2021-08-11 PROCEDURE — 272N000570 HC SHEATH CR7

## 2021-08-11 PROCEDURE — C1769 GUIDE WIRE: HCPCS

## 2021-08-11 PROCEDURE — 250N000009 HC RX 250: Performed by: RADIOLOGY

## 2021-08-11 PROCEDURE — 80048 BASIC METABOLIC PNL TOTAL CA: CPT | Performed by: NURSE PRACTITIONER

## 2021-08-11 PROCEDURE — 272N000504 HC NEEDLE CR4

## 2021-08-11 PROCEDURE — 85610 PROTHROMBIN TIME: CPT | Performed by: NURSE PRACTITIONER

## 2021-08-11 PROCEDURE — 49083 ABD PARACENTESIS W/IMAGING: CPT | Mod: GC | Performed by: RADIOLOGY

## 2021-08-11 PROCEDURE — 255N000002 HC RX 255 OP 636: Performed by: RADIOLOGY

## 2021-08-11 PROCEDURE — C1725 CATH, TRANSLUMIN NON-LASER: HCPCS

## 2021-08-11 PROCEDURE — 250N000011 HC RX IP 250 OP 636: Performed by: NURSE PRACTITIONER

## 2021-08-11 PROCEDURE — 36415 COLL VENOUS BLD VENIPUNCTURE: CPT | Performed by: NURSE PRACTITIONER

## 2021-08-11 PROCEDURE — 37183 REVISION TIPS: CPT | Mod: GC | Performed by: RADIOLOGY

## 2021-08-11 PROCEDURE — 49083 ABD PARACENTESIS W/IMAGING: CPT

## 2021-08-11 PROCEDURE — 999N000127 HC STATISTIC PERIPHERAL IV START W US GUIDANCE

## 2021-08-11 PROCEDURE — 37183 REVISION TIPS: CPT

## 2021-08-11 PROCEDURE — 272N000500 HC NEEDLE CR2

## 2021-08-11 PROCEDURE — 250N000009 HC RX 250: Performed by: NURSE PRACTITIONER

## 2021-08-11 PROCEDURE — 99152 MOD SED SAME PHYS/QHP 5/>YRS: CPT | Mod: GC | Performed by: RADIOLOGY

## 2021-08-11 PROCEDURE — 999N000132 HC STATISTIC PP CARE STAGE 1

## 2021-08-11 PROCEDURE — 258N000003 HC RX IP 258 OP 636: Performed by: NURSE PRACTITIONER

## 2021-08-11 PROCEDURE — 99153 MOD SED SAME PHYS/QHP EA: CPT

## 2021-08-11 PROCEDURE — 85027 COMPLETE CBC AUTOMATED: CPT | Performed by: NURSE PRACTITIONER

## 2021-08-11 RX ORDER — FLUMAZENIL 0.1 MG/ML
0.2 INJECTION, SOLUTION INTRAVENOUS
Status: DISCONTINUED | OUTPATIENT
Start: 2021-08-11 | End: 2021-08-11 | Stop reason: HOSPADM

## 2021-08-11 RX ORDER — NALOXONE HYDROCHLORIDE 0.4 MG/ML
0.4 INJECTION, SOLUTION INTRAMUSCULAR; INTRAVENOUS; SUBCUTANEOUS
Status: DISCONTINUED | OUTPATIENT
Start: 2021-08-11 | End: 2021-08-11 | Stop reason: HOSPADM

## 2021-08-11 RX ORDER — NALOXONE HYDROCHLORIDE 0.4 MG/ML
0.2 INJECTION, SOLUTION INTRAMUSCULAR; INTRAVENOUS; SUBCUTANEOUS
Status: DISCONTINUED | OUTPATIENT
Start: 2021-08-11 | End: 2021-08-11 | Stop reason: HOSPADM

## 2021-08-11 RX ORDER — LIDOCAINE 40 MG/G
CREAM TOPICAL
Status: DISCONTINUED | OUTPATIENT
Start: 2021-08-11 | End: 2021-08-11 | Stop reason: HOSPADM

## 2021-08-11 RX ORDER — HEPARIN SODIUM 200 [USP'U]/100ML
1 INJECTION, SOLUTION INTRAVENOUS CONTINUOUS PRN
Status: DISCONTINUED | OUTPATIENT
Start: 2021-08-11 | End: 2021-08-11 | Stop reason: HOSPADM

## 2021-08-11 RX ORDER — IODIXANOL 320 MG/ML
100 INJECTION, SOLUTION INTRAVASCULAR ONCE
Status: COMPLETED | OUTPATIENT
Start: 2021-08-11 | End: 2021-08-11

## 2021-08-11 RX ORDER — FENTANYL CITRATE 50 UG/ML
25-50 INJECTION, SOLUTION INTRAMUSCULAR; INTRAVENOUS EVERY 5 MIN PRN
Status: DISCONTINUED | OUTPATIENT
Start: 2021-08-11 | End: 2021-08-11 | Stop reason: HOSPADM

## 2021-08-11 RX ORDER — SODIUM CHLORIDE 9 MG/ML
INJECTION, SOLUTION INTRAVENOUS CONTINUOUS
Status: DISCONTINUED | OUTPATIENT
Start: 2021-08-11 | End: 2021-08-11 | Stop reason: HOSPADM

## 2021-08-11 RX ADMIN — FENTANYL CITRATE 50 MCG: 50 INJECTION, SOLUTION INTRAMUSCULAR; INTRAVENOUS at 11:39

## 2021-08-11 RX ADMIN — LIDOCAINE HYDROCHLORIDE 8 ML: 10 INJECTION, SOLUTION EPIDURAL; INFILTRATION; INTRACAUDAL; PERINEURAL at 11:30

## 2021-08-11 RX ADMIN — FENTANYL CITRATE 50 MCG: 50 INJECTION, SOLUTION INTRAMUSCULAR; INTRAVENOUS at 11:32

## 2021-08-11 RX ADMIN — HEPARIN SODIUM 1 BAG: 200 INJECTION, SOLUTION INTRAVENOUS at 11:45

## 2021-08-11 RX ADMIN — FENTANYL CITRATE 50 MCG: 50 INJECTION, SOLUTION INTRAMUSCULAR; INTRAVENOUS at 11:27

## 2021-08-11 RX ADMIN — MIDAZOLAM 1 MG: 1 INJECTION INTRAMUSCULAR; INTRAVENOUS at 11:26

## 2021-08-11 RX ADMIN — IODIXANOL 90 ML: 320 INJECTION, SOLUTION INTRAVASCULAR at 12:44

## 2021-08-11 RX ADMIN — MIDAZOLAM 1 MG: 1 INJECTION INTRAMUSCULAR; INTRAVENOUS at 11:32

## 2021-08-11 RX ADMIN — MIDAZOLAM 1 MG: 1 INJECTION INTRAMUSCULAR; INTRAVENOUS at 11:39

## 2021-08-11 RX ADMIN — LIDOCAINE: 40 CREAM TOPICAL at 09:15

## 2021-08-11 RX ADMIN — SODIUM CHLORIDE: 9 INJECTION, SOLUTION INTRAVENOUS at 09:36

## 2021-08-11 ASSESSMENT — MIFFLIN-ST. JEOR: SCORE: 1602.36

## 2021-08-11 NOTE — PROCEDURES
[facility]    Procedure: IR Procedure Note    Date/Time: 8/11/2021 1:09 PM  Performed by: Tian Larry DO  Authorized by: Jelani Tristan MD   IR Fellow Physician: Laron    UNIVERSAL PROTOCOL   Site Marked: NA  Prior Images Obtained and Reviewed:  Yes  Required items: Required blood products, implants, devices and special equipment available    Patient identity confirmed:  Verbally with patient, arm band, provided demographic data and hospital-assigned identification number  Patient was reevaluated immediately before administering moderate or deep sedation or anesthesia  Confirmation Checklist:  Patient's identity using two indicators, relevant allergies, procedure was appropriate and matched the consent or emergent situation and correct equipment/implants were available  Time out: Immediately prior to the procedure a time out was called    Universal Protocol: the Joint Commission Universal Protocol was followed    Preparation: Patient was prepped and draped in usual sterile fashion           ANESTHESIA    Anesthesia: See MAR for details  Local Anesthetic:  Lidocaine 1% without epinephrine      SEDATION    Patient Sedated: Yes    Vital signs: Vital signs monitored during sedation    See dictated procedure note for full details.  Findings: Patent TIPS with minimal intra-stent narrowing on venogram. Pressures below:    TIPS Pressures:     Pre-plasty:      MPV: 29  PVEOS: 29  Mid-stent: 28  HVEOS: 28  RA: 8     Post-plasty:      MPV: 27  PVEOS: 26  HVEOS: 25  RA: 13    Moderate volume of anechoic ascites in the right lateral abdomen. Paracentesis total: 4150mL       Specimens: none    Complications: None    Condition: Stable    Plan: 1 hour routine post-sedation observation with head of bed elevated to 30 degrees.     PROCEDURE   Patient Tolerance:  Patient tolerated the procedure well with no immediate complications and patient tolerated the procedure well  with no immediate complications  Describe Procedure: RIJ access with TIPS venogram with pressures. PTA balloon venoplasty with 10mm Titus balloon within the stent. Therapeutic paracentesis.  Length of time physician/provider present for 1:1 monitoring during sedation: 60

## 2021-08-11 NOTE — PROGRESS NOTES
1340-Pt tolerated po well.  Pt tolerated ambulation in the hallway and up to the bathroom.  Discharge instructions went over with and given to pt, all questions answered.  PIV D/C'ed, catheter intact.  1345-Pt D/C'ed yto home with his family.

## 2021-08-11 NOTE — PROGRESS NOTES
Pt arrived from IR procedure. Vitally stable. Unable to report pain as pt is still groggy from intra-procedural meds. R mid abdomen site CDI from para. RIJ site CDI. Both negative for hematoma. Ice chips and maira crackers given to pt, tolerating well. Pt still needs to ambulate and void.

## 2021-08-11 NOTE — PROGRESS NOTES
Pt scheduled for an IR procedure today 8/11/2021.  Last COVID test was six days ago.  Our policy is four days.  GONZALO Tristan MD has approved proceeding with the procedure.

## 2021-08-11 NOTE — PROGRESS NOTES
Patient Name: Ivan Bell  Medical Record Number: [id number]  Today's Date: 8/11/2021    Procedure: Transjugular Intrahepatic Portosystemic Shunt Revision and Paracentesis  Proceduralist: Dr. Tristan and Dr. Larry    Procedure Start: 1126  Procedure end: 1229  Sedation medications administered:    Fentanyl 150mcg   Midazolam 3mg       Report given to: Pastora DOBBS      Other Notes: Pt arrived to IR room 5 from . Consent reviewed. Pt denies any questions or concerns regarding procedure. Pt positioned Supine and monitored per protocol. Pt tolerated procedure without any noted complications. 4150 ml ascites fluid removed. Pt transferred back to 2A.

## 2021-08-11 NOTE — PROGRESS NOTES
Paged out to Diana (IR NP) about pt's K at 3.2. At this time there are no concerns and we will move forward with procedure.

## 2021-08-11 NOTE — PROGRESS NOTES
Paracentesis total: 4150mL    TIPS Pressures:    Pre-plasty:     MPV: 29  PVEOS: 29  Mid-stent: 28  HVEOS: 28  RA: 8    Post-plasty:     MPV: 27  PVEOS: 26  HVEOS: 25  RA: 13

## 2021-08-11 NOTE — DISCHARGE INSTRUCTIONS
[facility]      Interventional Radiology  Patient Instructions Following Paracentesis    AFTER YOU GO HOME:  ? If you were given sedation; we recommend that an adult stay with you for the first 24 hours.   No driving or alcoholic beverages for 24 hours.  ? Resume previous diet and medication  ? Limit strenuous physical activity such as lifting (>10 lbs.), straining, or exercising for 48 hours.  You may resume normal activity in 24 hours  ? Change gauze at the puncture site as needed to keep site dry.  Leaking of additional fluid is not uncommon during the first 24-48 hours.    CALL 911:  ? If this is a medical emergency    CALL THE PHYSICIAN:  ? If you develop a fever, severe abdominal pain or excessive bleeding from the paracentesis site within 24 hours of the procedure  ? If you experience severe lightheadedness or fainting, call you doctor immediately or come to the closest Emergency Department.  Do not drive yourself.  ? If you have questions or concerns regarding your procedure call the Radiologist            [facility]  Going Home after TIPS Revision                                                     After you go home:    Drink plenty of fluids.    Resume your normal diet    Do not scrub the procedure site vigorously for 3 days    No lotion or powder to the puncture site for 3 days    DO NOT do any strenuous exercise or lifting greater than 10 pounds for at least 2 days following your procedure    HOB elevated to at least 30 degrees. Do not lay flat for at least 2 hours after you go home.  IF YOU WERE GIVEN SEDATION    No driving or alcoholic beverages today    Do not make any important or legal decisions today    We recommend an adult stay with you for the first 24 hours    CALL THE PHYSICIAN IF:    Monitor neck site for bleeding, swelling. If any bleeding or swelling immediately apply pressure and call the doctor.    If you notice any changes in your voice or  breathing you should call your doctor immediately & come to the closest Emergency Department.  Do Not Drive Yourself.    You develop nausea or vomiting    You develop hives or a rash or any unexplained itching      [facility] INTERVENTIONAL RADIOLOGY DEPARTMENT        Procedure Physician:  Dr Tristan &  Dr Larry          Date of procedure:August 11, 2021        Telephone numbers:     [phone]  Monday-Friday 8:00 am to 4:30 pm                                              [phone]  After 4:30 pm Monday-Friday, Weekends & Holidays. Ask for the Interventional Radiologist on call.Someone is available  24 hours/day        [facility] toll free number: [phone]  Monday-Friday 8:00 am to 4:30 pm

## 2021-08-11 NOTE — PRE-PROCEDURE
GENERAL PRE-PROCEDURE:   Procedure:  TIPS revision and paracentesis    Written consent obtained?: Yes    Risks and benefits: Risks, benefits and alternatives were discussed    Consent given by:  Patient  Patient states understanding of procedure being performed: Yes    Patient's understanding of procedure matches consent: Yes    Procedure consent matches procedure scheduled: Yes    Expected level of sedation:  Moderate  Appropriately NPO:  Yes  Mallampati  :  Grade 2- soft palate, base of uvula, tonsillar pillars, and portion of posterior pharyngeal wall visible  Lungs:  Lungs clear with good breath sounds bilaterally  Heart:  Normal heart sounds and rate  History & Physical reviewed:  History and physical reviewed and no updates needed  Statement of review:  I have reviewed the lab findings, diagnostic data, medications, and the plan for sedation

## 2021-08-11 NOTE — PROGRESS NOTES
Pt arrived for TIPS. Denies pain. Vitally stable. H&P reviewed. Consent signed. Vascular consult placed per pt's request. IV access obtained in L lower forearm. Labs pending. [facility]ido to [facility] site covered with Tegaderm. Elicia (wife) at the bedside.     Pt's last COVID was noted on 8/5/21; NOT within the 4 day period per hospital policy. Awaiting to see if the MD still wants to move forward with the procedure.

## 2021-08-13 NOTE — PROGRESS NOTES
TIPS revision completed yesterday. Patient had questions regarding site care and plan per Dr. Tristan, reviewed instructions in Epic and should receive a call from that department to follow up early next week. He started the increased lasix and eplerenone today and will have lab work next week. No further questions at this time.

## 2021-08-16 ENCOUNTER — TELEPHONE (OUTPATIENT)
Dept: VASCULAR SURGERY | Facility: CLINIC | Age: 56
End: 2021-08-16

## 2021-08-16 NOTE — TELEPHONE ENCOUNTER
Called pt to fup on him s/p tips revision with Dr Tristan.    Inquired on how he is doing.     He states that he's still feeling weak and tired    He states that he has minimal pan    He states that he has an umbilical hernia that's not been able to reduce it, and it was there prior to .    He states that he had to take a week off to rest.     He states that he's not feeling any fluid accumulation.     I inquired if he's met a surgeon in the past to talk about surgery in which he states that he's not.   I informed him that I'll check with Dr. Tristan and then get back to him    We talked about follow up in which I will nahid him in 2 weeks to see how he is doing and if he is getting fluid accumulation the we will plan for US and labs.     He agrees to plan.     Denise LAZARO RN, BSN  Interventional Radiology/Vascular  Nurse Coordinator   Phone: [phone]  Fax: [phone]

## 2021-08-19 ENCOUNTER — TELEPHONE (OUTPATIENT)
Dept: BEHAVIORAL HEALTH | Facility: CLINIC | Age: 56
End: 2021-08-19

## 2021-08-23 ENCOUNTER — PATIENT OUTREACH (OUTPATIENT)
Dept: GASTROENTEROLOGY | Facility: CLINIC | Age: 56
End: 2021-08-23

## 2021-08-23 DIAGNOSIS — K70.31 ALCOHOLIC CIRRHOSIS OF LIVER WITH ASCITES (H): Primary | ICD-10-CM

## 2021-08-23 NOTE — PROGRESS NOTES
Patient called to report ongoing symptoms. He states he has sharp pains that cause him to double over every time he eats anything. The pain is in the herniated area, which has not been able to be reduced. He denies swelling in lower extremities but notes a several pound weight gain since the TIPS. He feels he has put on some fluid in the abdomen. This pain and discomfort also cause him nausea. He has not thrown up but he was very close to it over the weekend. He continues to have about 2 soft BM's daily. He wonders what he can to do alleviate this pain with eating.

## 2021-08-30 DIAGNOSIS — K70.31 ALCOHOLIC CIRRHOSIS OF LIVER WITH ASCITES (H): Primary | ICD-10-CM

## 2021-08-30 NOTE — PROGRESS NOTES
Message left regarding need for BMP and consult to surgery regarding umbilical hernia. Call back requested.

## 2021-09-03 ENCOUNTER — LAB (OUTPATIENT)
Dept: LAB | Facility: CLINIC | Age: 56
End: 2021-09-03
Attending: PHYSICIAN ASSISTANT
Payer: COMMERCIAL

## 2021-09-03 DIAGNOSIS — K70.31 ALCOHOLIC CIRRHOSIS OF LIVER WITH ASCITES (H): ICD-10-CM

## 2021-09-03 LAB
ANION GAP SERPL CALCULATED.3IONS-SCNC: 5 MMOL/L (ref 3–14)
BUN SERPL-MCNC: 10 MG/DL (ref 7–30)
CALCIUM SERPL-MCNC: 8.2 MG/DL (ref 8.5–10.1)
CHLORIDE BLD-SCNC: 114 MMOL/L (ref 94–109)
CO2 SERPL-SCNC: 27 MMOL/L (ref 20–32)
CREAT SERPL-MCNC: 1.23 MG/DL (ref 0.66–1.25)
GFR SERPL CREATININE-BSD FRML MDRD: 64 ML/MIN/1.73M2
GLUCOSE BLD-MCNC: 146 MG/DL (ref 70–99)
POTASSIUM BLD-SCNC: 3.7 MMOL/L (ref 3.4–5.3)
SODIUM SERPL-SCNC: 146 MMOL/L (ref 133–144)

## 2021-09-03 PROCEDURE — 80048 BASIC METABOLIC PNL TOTAL CA: CPT | Performed by: PATHOLOGY

## 2021-09-03 PROCEDURE — 36415 COLL VENOUS BLD VENIPUNCTURE: CPT | Performed by: PATHOLOGY

## 2021-09-08 ENCOUNTER — HOSPITAL ENCOUNTER (OUTPATIENT)
Dept: BEHAVIORAL HEALTH | Facility: CLINIC | Age: 56
Discharge: HOME | End: 2021-09-08
Attending: FAMILY MEDICINE | Admitting: FAMILY MEDICINE
Payer: COMMERCIAL

## 2021-09-08 VITALS — HEIGHT: 70 IN | BODY MASS INDEX: 23.62 KG/M2 | WEIGHT: 165 LBS

## 2021-09-08 DIAGNOSIS — E87.6 HYPOKALEMIA: ICD-10-CM

## 2021-09-08 DIAGNOSIS — K70.31 ALCOHOLIC CIRRHOSIS OF LIVER WITH ASCITES (H): ICD-10-CM

## 2021-09-08 PROCEDURE — H0001 ALCOHOL AND/OR DRUG ASSESS: HCPCS | Mod: TEL | Performed by: COUNSELOR

## 2021-09-08 RX ORDER — POTASSIUM CHLORIDE 1500 MG/1
40 TABLET, EXTENDED RELEASE ORAL DAILY
Qty: 120 TABLET | Refills: 1 | Status: SHIPPED | OUTPATIENT
Start: 2021-09-08 | End: 2022-01-04

## 2021-09-08 RX ORDER — EPLERENONE 50 MG/1
50 TABLET, FILM COATED ORAL DAILY
Qty: 90 TABLET | Refills: 0 | Status: SHIPPED | OUTPATIENT
Start: 2021-09-08 | End: 2021-09-29

## 2021-09-08 ASSESSMENT — ANXIETY QUESTIONNAIRES
4. TROUBLE RELAXING: NOT AT ALL
2. NOT BEING ABLE TO STOP OR CONTROL WORRYING: MORE THAN HALF THE DAYS
3. WORRYING TOO MUCH ABOUT DIFFERENT THINGS: MORE THAN HALF THE DAYS
5. BEING SO RESTLESS THAT IT IS HARD TO SIT STILL: NOT AT ALL
7. FEELING AFRAID AS IF SOMETHING AWFUL MIGHT HAPPEN: NOT AT ALL
6. BECOMING EASILY ANNOYED OR IRRITABLE: NOT AT ALL
1. FEELING NERVOUS, ANXIOUS, OR ON EDGE: NOT AT ALL
GAD7 TOTAL SCORE: 4

## 2021-09-08 ASSESSMENT — COLUMBIA-SUICIDE SEVERITY RATING SCALE - C-SSRS
TOTAL  NUMBER OF ABORTED OR SELF INTERRUPTED ATTEMPTS PAST 3 MONTHS: NO
6. HAVE YOU EVER DONE ANYTHING, STARTED TO DO ANYTHING, OR PREPARED TO DO ANYTHING TO END YOUR LIFE?: NO
4. HAVE YOU HAD THESE THOUGHTS AND HAD SOME INTENTION OF ACTING ON THEM?: NO
3. HAVE YOU BEEN THINKING ABOUT HOW YOU MIGHT KILL YOURSELF?: NO
ATTEMPT LIFETIME: NO
2. HAVE YOU ACTUALLY HAD ANY THOUGHTS OF KILLING YOURSELF?: NO
1. IN THE PAST MONTH, HAVE YOU WISHED YOU WERE DEAD OR WISHED YOU COULD GO TO SLEEP AND NOT WAKE UP?: NO
TOTAL  NUMBER OF INTERRUPTED ATTEMPTS PAST 3 MONTHS: NO
TOTAL  NUMBER OF ABORTED OR SELF INTERRUPTED ATTEMPTS PAST LIFETIME: NO
TOTAL  NUMBER OF INTERRUPTED ATTEMPTS LIFETIME: NO
5. HAVE YOU STARTED TO WORK OUT OR WORKED OUT THE DETAILS OF HOW TO KILL YOURSELF? DO YOU INTEND TO CARRY OUT THIS PLAN?: NO
2. HAVE YOU ACTUALLY HAD ANY THOUGHTS OF KILLING YOURSELF LIFETIME?: NO
5. HAVE YOU STARTED TO WORK OUT OR WORKED OUT THE DETAILS OF HOW TO KILL YOURSELF? DO YOU INTEND TO CARRY OUT THIS PLAN?: NO
6. HAVE YOU EVER DONE ANYTHING, STARTED TO DO ANYTHING, OR PREPARED TO DO ANYTHING TO END YOUR LIFE?: NO
ATTEMPT PAST THREE MONTHS: NO
1. IN THE PAST MONTH, HAVE YOU WISHED YOU WERE DEAD OR WISHED YOU COULD GO TO SLEEP AND NOT WAKE UP?: NO
4. HAVE YOU HAD THESE THOUGHTS AND HAD SOME INTENTION OF ACTING ON THEM?: NO

## 2021-09-08 ASSESSMENT — MIFFLIN-ST. JEOR: SCORE: 1574.69

## 2021-09-08 ASSESSMENT — PAIN SCALES - GENERAL: PAINLEVEL: MILD PAIN (2)

## 2021-09-08 ASSESSMENT — PATIENT HEALTH QUESTIONNAIRE - PHQ9: SUM OF ALL RESPONSES TO PHQ QUESTIONS 1-9: 7

## 2021-09-08 NOTE — PROGRESS NOTES
[facility]  [facility] [place] [address]        9/8/2021      Ivan Bell  [address] [facility] [address]      Dear Mr. Bell,    It was a pleasure meeting with you on 9/8/2021 for your Chemical Dependency Evaluation. Based on your evaluation, the recommendation is:  1)  Attend 6 individual psychotherapy sessions with a therapist and then follow their recommendations.  2)  Abstain from all mood-altering chemicals unless prescribed by a licensed provider.   3)  You are strongly encouraged to attend one weekly sober support group meeting, such as 12 step based (AA/NA), SMART Recovery, [facility], [facility], [facility], [place] [facility] meetings.     4)  Follow all the recommendations of your treatment/medical providers.    To find a therapist, you can contact your insurance company to see who is in network. You can schedule therapy through [place] by calling [phone]. Other options can include [employer] ([employer]AM): [email or web address]-[email or web address]-[email or web address]-[email or web address]    If I can be of further service, please don't hesitate to call.    Sincerely,    Viviana Stanley MA [facility]  CD Evaluation Counselor  [phone]

## 2021-09-08 NOTE — PROGRESS NOTES
Reviewed with both patient and spouse Elicia the recommended follow up needs, including lab work, MRI, and surgery consult. Provided scheduling numbers and also sent message to our  to reach out to assist. Patient did get lab work done. He wonders if he can decrease his potassium and also if there are any other medications on his list that he can eliminate. Temporary refills of eplerenone and potassium provided as patient is almost out.
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
## 2021-09-09 ASSESSMENT — ANXIETY QUESTIONNAIRES: GAD7 TOTAL SCORE: 4

## 2021-09-10 ENCOUNTER — TELEPHONE (OUTPATIENT)
Dept: VASCULAR SURGERY | Facility: CLINIC | Age: 56
End: 2021-09-10

## 2021-09-10 NOTE — PROGRESS NOTES
Message left with update from Celestino Verduzco to continue eplerenone and furosemide as prescribed, as well as potassium. She thinks he can speak to nephrology about discontinuation of sodium bicarb. Call back number provided.

## 2021-09-10 NOTE — TELEPHONE ENCOUNTER
Called pt to fup on his VM.    He is inquiring on the genera surgery referral for his hernia repair.     Informed him that I did send a msg to the [facility] surg pool to see if they can call him for an appt.    He agrees to plan    Denise LAZARO RN, BSN  Interventional Radiology/Vascular  Nurse Coordinator   Phone: [phone]  Fax: [phone]

## 2021-09-10 NOTE — TELEPHONE ENCOUNTER
REFERRAL INFORMATION:    Referring Provider:  Dr. Jelani Tristan     Referring Clinic[facility]     Reason for Visit/Diagnosis: Umbilical hernia        FUTURE VISIT INFORMATION:    Appointment Date: 9/27/2021    Appointment Time: 12:30 PM      NOTES RECORD STATUS  DETAILS   OFFICE NOTE from Referring Provider N/A    OFFICE NOTE from Other Specialists N/A    HOSPITAL DISCHARGE SUMMARY/ ED VISITS  Care Everywhere 8/2/2021 ([facility])    OPERATIVE REPORT N/A    ENDOSCOPY (EGD)  Internal 6/4/19, 4/13/18, 3/9/18    ** More in EPIC   PERTINENT LABS Internal/ Care Everywhere    PATHOLOGY REPORTS (RELATED) N/A    IMAGING (CT, MRI, US, XR)  Internal/ Care Everywhere US Abdomen: 6/23/2021, 11/3/2020, 8/20/2020, 3/3/2020    [facility]:  - CT Abdomen Pelvis: 8/2/2021 9/20/2021 1:45pm Fax request sent to [facility] for image. -[place]te

## 2021-09-12 ENCOUNTER — HEALTH MAINTENANCE LETTER (OUTPATIENT)
Age: 56
End: 2021-09-12

## 2021-09-16 ENCOUNTER — ANCILLARY PROCEDURE (OUTPATIENT)
Dept: MRI IMAGING | Facility: CLINIC | Age: 56
End: 2021-09-16
Attending: PHYSICIAN ASSISTANT
Payer: COMMERCIAL

## 2021-09-16 DIAGNOSIS — K70.31 ALCOHOLIC CIRRHOSIS OF LIVER WITH ASCITES (H): ICD-10-CM

## 2021-09-16 PROCEDURE — A9585 GADOBUTROL INJECTION: HCPCS | Performed by: RADIOLOGY

## 2021-09-16 PROCEDURE — 74183 MRI ABD W/O CNTR FLWD CNTR: CPT | Mod: TC | Performed by: RADIOLOGY

## 2021-09-16 RX ORDER — GADOBUTROL 604.72 MG/ML
7.5 INJECTION INTRAVENOUS ONCE
Status: COMPLETED | OUTPATIENT
Start: 2021-09-16 | End: 2021-09-16

## 2021-09-16 RX ADMIN — GADOBUTROL 7.5 ML: 604.72 INJECTION INTRAVENOUS at 10:05

## 2021-09-22 ENCOUNTER — PATIENT OUTREACH (OUTPATIENT)
Dept: GASTROENTEROLOGY | Facility: CLINIC | Age: 56
End: 2021-09-22

## 2021-09-22 DIAGNOSIS — K70.31 ALCOHOLIC CIRRHOSIS OF LIVER WITH ASCITES (H): Primary | ICD-10-CM

## 2021-09-22 NOTE — PROGRESS NOTES
Spoke to both patient and spouse Elicia. They are both aware of upcoming appointments with Celestino DURAN, labs, and surgery consult.

## 2021-09-24 ENCOUNTER — PATIENT OUTREACH (OUTPATIENT)
Dept: SURGERY | Facility: CLINIC | Age: 56
End: 2021-09-24

## 2021-09-24 NOTE — PROGRESS NOTES
Patient Telephone Reminder Call    Date of call:  09/24/21  Phone numbers:  Cell number on file:    Telephone Information:   Mobile [phone]       Reached patient/confirmed appointment:  Yes  Appointment with:   Dr. Stanford Washington  Reason for visit:  Hernia consult

## 2021-09-27 ENCOUNTER — PRE VISIT (OUTPATIENT)
Dept: SURGERY | Facility: CLINIC | Age: 56
End: 2021-09-27

## 2021-09-27 ENCOUNTER — LAB (OUTPATIENT)
Dept: LAB | Facility: CLINIC | Age: 56
End: 2021-09-27
Payer: COMMERCIAL

## 2021-09-27 ENCOUNTER — OFFICE VISIT (OUTPATIENT)
Dept: SURGERY | Facility: CLINIC | Age: 56
End: 2021-09-27
Payer: COMMERCIAL

## 2021-09-27 ENCOUNTER — OFFICE VISIT (OUTPATIENT)
Dept: GASTROENTEROLOGY | Facility: CLINIC | Age: 56
End: 2021-09-27
Attending: PHYSICIAN ASSISTANT
Payer: COMMERCIAL

## 2021-09-27 VITALS
WEIGHT: 172 LBS | OXYGEN SATURATION: 100 % | BODY MASS INDEX: 24.62 KG/M2 | HEIGHT: 70 IN | TEMPERATURE: 98.2 F | HEART RATE: 68 BPM | SYSTOLIC BLOOD PRESSURE: 145 MMHG | DIASTOLIC BLOOD PRESSURE: 74 MMHG

## 2021-09-27 VITALS
TEMPERATURE: 99 F | OXYGEN SATURATION: 99 % | DIASTOLIC BLOOD PRESSURE: 82 MMHG | WEIGHT: 173 LBS | HEART RATE: 78 BPM | SYSTOLIC BLOOD PRESSURE: 147 MMHG | BODY MASS INDEX: 24.82 KG/M2

## 2021-09-27 DIAGNOSIS — Z95.828 S/P TIPS (TRANSJUGULAR INTRAHEPATIC PORTOSYSTEMIC SHUNT): Primary | ICD-10-CM

## 2021-09-27 DIAGNOSIS — K42.9 UMBILICAL HERNIA WITHOUT OBSTRUCTION AND WITHOUT GANGRENE: ICD-10-CM

## 2021-09-27 DIAGNOSIS — K70.31 ALCOHOLIC CIRRHOSIS OF LIVER WITH ASCITES (H): ICD-10-CM

## 2021-09-27 DIAGNOSIS — K42.9 UMBILICAL HERNIA WITHOUT OBSTRUCTION AND WITHOUT GANGRENE: Primary | ICD-10-CM

## 2021-09-27 LAB
ALBUMIN SERPL-MCNC: 2.2 G/DL (ref 3.4–5)
ALP SERPL-CCNC: 144 U/L (ref 40–150)
ALT SERPL W P-5'-P-CCNC: 24 U/L (ref 0–70)
ANION GAP SERPL CALCULATED.3IONS-SCNC: 8 MMOL/L (ref 3–14)
AST SERPL W P-5'-P-CCNC: 41 U/L (ref 0–45)
BILIRUB DIRECT SERPL-MCNC: 1.5 MG/DL (ref 0–0.2)
BILIRUB SERPL-MCNC: 3.6 MG/DL (ref 0.2–1.3)
BUN SERPL-MCNC: 9 MG/DL (ref 7–30)
CALCIUM SERPL-MCNC: 8.4 MG/DL (ref 8.5–10.1)
CHLORIDE BLD-SCNC: 110 MMOL/L (ref 94–109)
CO2 SERPL-SCNC: 26 MMOL/L (ref 20–32)
CREAT SERPL-MCNC: 1.13 MG/DL (ref 0.66–1.25)
ERYTHROCYTE [DISTWIDTH] IN BLOOD BY AUTOMATED COUNT: 17.2 % (ref 10–15)
GFR SERPL CREATININE-BSD FRML MDRD: 71 ML/MIN/1.73M2
GLUCOSE BLD-MCNC: 90 MG/DL (ref 70–99)
HCT VFR BLD AUTO: 34.8 % (ref 40–53)
HGB BLD-MCNC: 12.1 G/DL (ref 13.3–17.7)
INR PPP: 1.59 (ref 0.85–1.15)
MCH RBC QN AUTO: 34 PG (ref 26.5–33)
MCHC RBC AUTO-ENTMCNC: 34.8 G/DL (ref 31.5–36.5)
MCV RBC AUTO: 98 FL (ref 78–100)
PLATELET # BLD AUTO: 65 10E3/UL (ref 150–450)
POTASSIUM BLD-SCNC: 3.3 MMOL/L (ref 3.4–5.3)
PROT SERPL-MCNC: 5.9 G/DL (ref 6.8–8.8)
RBC # BLD AUTO: 3.56 10E6/UL (ref 4.4–5.9)
SODIUM SERPL-SCNC: 144 MMOL/L (ref 133–144)
WBC # BLD AUTO: 3.4 10E3/UL (ref 4–11)

## 2021-09-27 PROCEDURE — 99214 OFFICE O/P EST MOD 30 MIN: CPT | Performed by: PHYSICIAN ASSISTANT

## 2021-09-27 PROCEDURE — 82248 BILIRUBIN DIRECT: CPT | Performed by: PATHOLOGY

## 2021-09-27 PROCEDURE — 36415 COLL VENOUS BLD VENIPUNCTURE: CPT | Performed by: PATHOLOGY

## 2021-09-27 PROCEDURE — 85610 PROTHROMBIN TIME: CPT | Performed by: PATHOLOGY

## 2021-09-27 PROCEDURE — 99203 OFFICE O/P NEW LOW 30 MIN: CPT | Performed by: SURGERY

## 2021-09-27 PROCEDURE — G0463 HOSPITAL OUTPT CLINIC VISIT: HCPCS

## 2021-09-27 PROCEDURE — 85027 COMPLETE CBC AUTOMATED: CPT | Performed by: PATHOLOGY

## 2021-09-27 PROCEDURE — 80053 COMPREHEN METABOLIC PANEL: CPT | Performed by: PATHOLOGY

## 2021-09-27 ASSESSMENT — PAIN SCALES - GENERAL: PAINLEVEL: SEVERE PAIN (6)

## 2021-09-27 ASSESSMENT — MIFFLIN-ST. JEOR: SCORE: 1606.44

## 2021-09-27 NOTE — PATIENT INSTRUCTIONS
You met with Dr. Stanford Washington.      Today's visit instructions:    Dr. Washington does not recommend surgery to repair your hernia at this time.        If you have questions please contact Cinthia RN or Estelita RN during regular clinic hours, Monday through Friday 7:30 AM - 4:00 PM, or you can contact us via [employer] at anytime.       If you have urgent needs after-hours, weekends, or holidays please call the hospital at [phone] and ask to speak with our on-call General Surgery Team.    Appointment [phone], option #1   Nurse Advice (Cinthia or Estelita): [phone]   Surgery Scheduler (Matty): [phone]  Fax: [phone]

## 2021-09-27 NOTE — LETTER
9/27/2021         RE: Ivan Bell  [address] [address] [address]        Dear Colleague,    Thank you for referring your patient, Ivan Bell, to the [facility] HEPATOLOG[facility] [place]. Please see a copy of my visit note below.    Hepatology Follow-up Clinic note  Ivan Bell   Date of Birth 1965  Date of Service 9/27/2021    Reason for follow-up: ETOH cirrhosis          Assessment/plan:   Ivan Bell is a 56 year old male with ETOH cirrhosis, complicated by ascites s/p TIPS recent revision (on 8/11/21) who still has some ascites on imaging yet. HE is well controlled with current regimen.     He has symptomatic umbilical hernia which is causing him a lot of pain and limits his ability to eat. Discussed surgery would be complicated by persistent ascites. Recommended waiting a period of time to see if ascites will resolve after recent TIPS revision. Will also increase diuretics.He continues to have mild liver dysfunction. MELD-Na 17. He is up to date with HCC screening and does not need variceal screening with patent TIPS. He has remained sober from alcohol and is awaiting CD recommendations.     # Ascites s/p TIPs:   - Labs pending at the time of visit today.   - Increase furosemide to 40 mg twice daily   - Increase eplerenone to 100 mg in morning and 50 mg night  - Repeat BMP in one weeks  - Continue high protein, 2000 mg sodium    # HCC screening, indeterminate nodule:   - Repeat MRI in 3 months     # Hepatic encephalopathy, controlled:   - Continue lactulose (dose to 3-5 BM day)   - Continue Rifaximin 550 mg twice daily     # Umbilical hernia:   - Sees Gen Surg later today     # History of alcohol abuse:   - Continue with treatment per CD recommendations     # Follow-up in clinic with Dr. Bales in 3 months or sooner as needed    Celestino Verduzco PA-C   [facility] Hepatology clinic    -----------------------------------------------------       HPI:   Ivan KYRIE  Ray is a 56 year old male presenting for follow-up.     Cirrhosis  - ETOH  - hx ascites, TIPS placement 5/14/18, 6/6/18; TIPS revision 10/29/19, 11/25/2020,   - hx HE  - hx variceal bleed Oct 2017  - ETOH hepatitis March 2019  - last EGD Jun 2019- dimin EV, mild portal hypertensive gastropathy  - HCC screening-      Patient last saw Dr. Bales on 2/1/2021. No recent hospitalizations or ER visits.     Developed recurrent ascites and found to have increased velocities on US abdomen w/ doppler in August. TIPS revision on 8/11/2021. Fluid has improved after that. Recent MRI still showing mod to large volume ascites.      Appetite is fair. Pains in stomach when he eats. Usually have two to three bowel movements a day. No problems with confusion.     Currently taking Furosemide 40 mg and   Eplerone 50 mg and potassium supplement.   Weight is stable.     Patient denies jaundice, lower extremity edema. Patient also denies melena, hematochezia or hematemesis. Patient denies weight loss, fevers, sweats or chills.    Still awaiting CD recommendations. Working full time, does a lot of heavy lifting Avg 70-80 lbs.       Medical hx Surgical hx   Past Medical History:   Diagnosis Date     Alcoholic cirrhosis (H)      Alcoholic cirrhosis of liver (H)      Alcoholic hepatitis 03/2019     Arthritis      Chronic kidney disease      Depression      Gout      Gout      Hepatic encephalopathy (H)      History of transfusion      Hypertension      Hypertension      Hypertriglyceridemia      Left calcaneus fracture 1/9/2006     Nephrolithiasis      Portal vein thrombosis      Thrombocytopenia (H)     Past Surgical History:   Procedure Laterality Date     ANKLE SURGERY Left      ANKLE SURGERY       ARTHROSCOPY KNEE       ARTHROSCOPY SHOULDER       ARTHROSCOPY SHOULDER ROTATOR CUFF REPAIR       C PLASTY KNEE,MED OR LAT COMPARTMT Right 02/19/2021    Procedure: RIGHT UNICOMPARTMENTAL ARTHROPLASTY KNEE MEDIAL;  Surgeon: José Miguel Landaverde  MD;  Location: [facility] OR;  Service: Orthopedics     COLONOSCOPY N/A 03/31/2016    Procedure: COLONOSCOPY;  Surgeon: Rhys Uriostegui MD;  Location:  GI     ESOPHAGOSCOPY, GASTROSCOPY, DUODENOSCOPY (EGD), COMBINED N/A 03/31/2016    Procedure: COMBINED ESOPHAGOSCOPY, GASTROSCOPY, DUODENOSCOPY (EGD);  Surgeon: Rhys Uriostegui MD;  Location:  GI     ESOPHAGOSCOPY, GASTROSCOPY, DUODENOSCOPY (EGD), COMBINED N/A 03/09/2018    Procedure: COMBINED ESOPHAGOSCOPY, GASTROSCOPY, DUODENOSCOPY (EGD), BIOPSY SINGLE OR MULTIPLE;  EGD;  Surgeon: Gonzalo Wahl MD;  Location:  GI     ESOPHAGOSCOPY, GASTROSCOPY, DUODENOSCOPY (EGD), COMBINED N/A 06/07/2019    Procedure: ESOPHAGOGASTRODUODENOSCOPY (EGD);  Surgeon: Gonzalo Wahl MD;  Location:  GI     FOOT SURGERY       HIP SURGERY       IR PARACENTESIS  10/29/2019     IR PARACENTESIS  11/25/2020     IR PARACENTESIS  8/11/2021     IR TRANSVEN INTRAHEPATIC PORTOSYST REV  10/29/2019     IR TRANSVEN INTRAHEPATIC PORTOSYST REV  11/25/2020     IR TRANSVEN INTRAHEPATIC PORTOSYST REV  8/11/2021     KNEE SURGERY Left      KNEE SURGERY Right      RELEASE CARPAL TUNNEL       RELEASE TRIGGER FINGER Right 06/11/2020    Procedure: RELEASE, TRIGGER FINGER, right ring and long finger;  Surgeon: Pascual Valencia MD;  Location: [facility] OR     SIGMOIDOSCOPY FLEXIBLE N/A 10/31/2017    Procedure: SIGMOIDOSCOPY FLEXIBLE;;  Surgeon: Armaan dAams MD;  Location:  GI     TIPS Procedure  06/06/2018     TIPS PROCEDURE  11/01/2020                 Medications:     Current Outpatient Medications   Medication     acetaminophen (TYLENOL) 500 MG tablet     Ascorbic Acid (VITAMIN C PO)     eplerenone (INSPRA) 50 MG tablet     fexofenadine (ALLEGRA) 60 MG tablet     furosemide (LASIX) 20 MG tablet     lactulose (CHRONULAC) 10 GM/15ML solution     Menaquinone-7 (VITAMIN K2) 100 MCG CAPS     MULTIPLE VITAMINS PO     potassium chloride ER (KLOR-CON M) 20 MEQ CR  tablet     rifaximin (XIFAXAN) 550 MG TABS tablet     senna-docusate (SENOKOT-S/PERICOLACE) 8.6-50 MG tablet     sertraline (ZOLOFT) 50 MG tablet     sildenafil (REVATIO) 20 MG tablet     sodium bicarbonate 650 MG tablet     vitamin D3 (CHOLECALCIFEROL) 2000 units (50 mcg) tablet     order for DME     oxyCODONE (ROXICODONE) 5 MG tablet     No current facility-administered medications for this visit.            Allergies:     Allergies   Allergen Reactions     Prednisone Visual Disturbance     Trazodone Visual Disturbance     Benadryl [Diphenhydramine] Other (See Comments)     Delirium (visual and auditory hallucinations)            Review of Systems:   10 points ROS was obtained and highlighted in the HPI, otherwise negative.          Physical Exam:   VS:  BP (!) 147/82   Pulse 78   Temp 99  F (37.2  C)   Wt 78.5 kg (173 lb)   SpO2 99%   BMI 24.82 kg/m      Gen: A&Ox3, NAD, well developed  HEENT: non-icteric  CV: RRR, no overt murmurs  Lung: CTA Bilatererally, no wheezing or crackles.   Lym- no palpable lymphadenopathy  Abd: soft, NT, ND, nonreducible umbilical hernia    Ext: no edema, intact pulses.   Skin: No rash, no palmar erythema, telangiectasias or jaundice  Neuro: grossly intact, no asterixis   Psych: appropriate mood and affects         Data:   Reviewed in person and significant for:    Lab Results   Component Value Date     09/03/2021     06/23/2021      Lab Results   Component Value Date    POTASSIUM 3.7 09/03/2021    POTASSIUM 4.0 06/23/2021     Lab Results   Component Value Date    CHLORIDE 114 09/03/2021    CHLORIDE 117 06/23/2021     Lab Results   Component Value Date    CO2 27 09/03/2021    CO2 25 06/23/2021     Lab Results   Component Value Date    BUN 10 09/03/2021    BUN 8 06/23/2021     Lab Results   Component Value Date    CR 1.23 09/03/2021    CR 1.09 06/23/2021       Lab Results   Component Value Date    WBC 5.6 08/11/2021    WBC 4.3 06/23/2021     Lab Results   Component  Value Date    HGB 13.4 08/11/2021    HGB 12.1 06/23/2021     Lab Results   Component Value Date    HCT 41.0 08/11/2021    HCT 37.3 06/23/2021     Lab Results   Component Value Date     08/11/2021    MCV 99 06/23/2021     Lab Results   Component Value Date    PLT 67 08/11/2021    PLT 58 06/23/2021       Lab Results   Component Value Date    AST 30 08/11/2021    AST 33 06/23/2021     Lab Results   Component Value Date    ALT 22 08/11/2021    ALT 19 06/23/2021     Lab Results   Component Value Date    BILICONJ 0.1 03/30/2011      Lab Results   Component Value Date    BILITOTAL 3.0 08/11/2021    BILITOTAL 3.6 06/23/2021       Lab Results   Component Value Date    ALBUMIN 2.3 08/11/2021    ALBUMIN 2.7 06/23/2021     Lab Results   Component Value Date    PROTTOTAL 5.6 08/11/2021    PROTTOTAL 5.6 06/23/2021      Lab Results   Component Value Date    ALKPHOS 124 08/11/2021    ALKPHOS 165 06/23/2021       Lab Results   Component Value Date    INR 1.67 08/11/2021    INR 1.79 06/23/2021     MRI ABDOMEN:   9/16/2021:     IMPRESSION:  1. Segment 4A/4B nodule is technically indeterminant, though the  suggestion of enhancement at subtraction imaging may be artifactual  related to the pronounced motion artifact that limits this exam. There  are no other focal worrisome hepatic lesions noted. Recommend  surveillance MRI in 3 months. LI-RADS-NC (non categorizable).  2. Hepatic cirrhosis, and portal hypertension evidenced by  splenomegaly, moderate-to-large ascites, and vascular varices.         Again, thank you for allowing me to participate in the care of your patient.        Sincerely,        Celestino Verduzco PA-C

## 2021-09-27 NOTE — PROGRESS NOTES
Ivan Bell is a 56 year old male with a 4 year history of an umbilical hernia with the following symptoms of lump.      Obstructive symptoms:  no  Urinary difficulties:  no  Chronic cough: no  Constipation:  no  Current level of activity:  Medium, works with [employer]    Past medical history, medications, allergies, family history, and social history were reviewed with the patient.  Ascites +  Albumin 2.2  plts 65    Past Medical History:   Diagnosis Date     Alcoholic cirrhosis (H)      Alcoholic cirrhosis of liver (H)      Alcoholic hepatitis 03/2019     Arthritis     OA     Chronic kidney disease     CRF     Depression      Gout      Gout      Hepatic encephalopathy (H)     alcohol related     History of transfusion      Hypertension      Hypertension      Hypertriglyceridemia      Left calcaneus fracture 1/9/2006 January 16, 2006: Fell 10 feet from ladder onto left foot on frozen ground on 1/9/06 at home.  Immediate pain and unable to walk- seen at [place] and diagnosed with calcaneus fracture     Nephrolithiasis      Portal vein thrombosis     left occlusion, partial main     Thrombocytopenia (H)      Past Surgical History:   Procedure Laterality Date     ANKLE SURGERY Left      ANKLE SURGERY       ARTHROSCOPY KNEE       ARTHROSCOPY SHOULDER       ARTHROSCOPY SHOULDER ROTATOR CUFF REPAIR       C PLASTY KNEE,MED OR LAT COMPARTMT Right 02/19/2021    Procedure: RIGHT UNICOMPARTMENTAL ARTHROPLASTY KNEE MEDIAL;  Surgeon: José Miguel Landaverde MD;  Location: [facility];  Service: Orthopedics     COLONOSCOPY N/A 03/31/2016    Procedure: COLONOSCOPY;  Surgeon: Rhys Uriostegui MD;  Location:  GI     ESOPHAGOSCOPY, GASTROSCOPY, DUODENOSCOPY (EGD), COMBINED N/A 03/31/2016    Procedure: COMBINED ESOPHAGOSCOPY, GASTROSCOPY, DUODENOSCOPY (EGD);  Surgeon: Rhys Uriostegui MD;  Location:  GI     ESOPHAGOSCOPY, GASTROSCOPY, DUODENOSCOPY (EGD), COMBINED N/A 03/09/2018    Procedure:  "COMBINED ESOPHAGOSCOPY, GASTROSCOPY, DUODENOSCOPY (EGD), BIOPSY SINGLE OR MULTIPLE;  EGD;  Surgeon: Gonzalo Wahl MD;  Location:  GI     ESOPHAGOSCOPY, GASTROSCOPY, DUODENOSCOPY (EGD), COMBINED N/A 06/07/2019    Procedure: ESOPHAGOGASTRODUODENOSCOPY (EGD);  Surgeon: Gonzalo Wahl MD;  Location:  GI     FOOT SURGERY       HIP SURGERY       IR PARACENTESIS  10/29/2019     IR PARACENTESIS  11/25/2020     IR PARACENTESIS  8/11/2021     IR TRANSVEN INTRAHEPATIC PORTOSYST REV  10/29/2019     IR TRANSVEN INTRAHEPATIC PORTOSYST REV  11/25/2020     IR TRANSVEN INTRAHEPATIC PORTOSYST REV  8/11/2021     KNEE SURGERY Left      KNEE SURGERY Right      RELEASE CARPAL TUNNEL       RELEASE TRIGGER FINGER Right 06/11/2020    Procedure: RELEASE, TRIGGER FINGER, right ring and long finger;  Surgeon: Pascual Valencia MD;  Location: [facility] OR     SIGMOIDOSCOPY FLEXIBLE N/A 10/31/2017    Procedure: SIGMOIDOSCOPY FLEXIBLE;;  Surgeon: Armaan Adams MD;  Location:  GI     TIPS Procedure  06/06/2018     TIPS PROCEDURE  11/01/2020       ROS: 10 point review of systems negative except noted in HPI  PHYSICAL EXAM  General appearance- alert, and in no distress.  Neck- Neck is supple without obvious adenopathy.  Lungs- Respiratory effort unlabored.  Gait- Normal.  Abdomen - soft non distended, non tender with umbilical hernia with fat in defect. Overlying skin ok.    Impression: Low risk umbilical hernia in high risk patient.   Needs albumin to be > 3 and no paracentesis for 6 months.  Understands use of binder may achieve cosmetic result patient desires.  No surgical intervention at this point.  \"Total time = 30 minutes, spent on the date of encounter doing chart review, history and physical, documentation, patient education, and any further activity as noted above.         "

## 2021-09-27 NOTE — LETTER
9/27/2021       RE: Ivan Bell  [address] [address] [address]     Dear Colleague,    Thank you for referring your patient, Ivan Bell, to the [facility] GENERAL SURGERY CLINIC [place] at [facility]. Please see a copy of my visit note below.    Ivan Bell is a 56 year old male with a 4 year history of an umbilical hernia with the following symptoms of lump.      Obstructive symptoms:  no  Urinary difficulties:  no  Chronic cough: no  Constipation:  no  Current level of activity:  Medium, works with [employer]    Past medical history, medications, allergies, family history, and social history were reviewed with the patient.  Ascites +  Albumin 2.2  plts 65    Past Medical History:   Diagnosis Date     Alcoholic cirrhosis (H)      Alcoholic cirrhosis of liver (H)      Alcoholic hepatitis 03/2019     Arthritis     OA     Chronic kidney disease     CRF     Depression      Gout      Gout      Hepatic encephalopathy (H)     alcohol related     History of transfusion      Hypertension      Hypertension      Hypertriglyceridemia      Left calcaneus fracture 1/9/2006 January 16, 2006: Fell 10 feet from ladder onto left foot on frozen ground on 1/9/06 at home.  Immediate pain and unable to walk- seen at [place] and diagnosed with calcaneus fracture     Nephrolithiasis      Portal vein thrombosis     left occlusion, partial main     Thrombocytopenia (H)      Past Surgical History:   Procedure Laterality Date     ANKLE SURGERY Left      ANKLE SURGERY       ARTHROSCOPY KNEE       ARTHROSCOPY SHOULDER       ARTHROSCOPY SHOULDER ROTATOR CUFF REPAIR       C PLASTY KNEE,MED OR LAT COMPARTMT Right 02/19/2021    Procedure: RIGHT UNICOMPARTMENTAL ARTHROPLASTY KNEE MEDIAL;  Surgeon: José Miguel Landaverde MD;  Location: [facility];  Service: Orthopedics     COLONOSCOPY N/A 03/31/2016    Procedure: COLONOSCOPY;  Surgeon: Rhys Uriostegui,  "MD;  Location:  GI     ESOPHAGOSCOPY, GASTROSCOPY, DUODENOSCOPY (EGD), COMBINED N/A 03/31/2016    Procedure: COMBINED ESOPHAGOSCOPY, GASTROSCOPY, DUODENOSCOPY (EGD);  Surgeon: Rhys Uriostegui MD;  Location:  GI     ESOPHAGOSCOPY, GASTROSCOPY, DUODENOSCOPY (EGD), COMBINED N/A 03/09/2018    Procedure: COMBINED ESOPHAGOSCOPY, GASTROSCOPY, DUODENOSCOPY (EGD), BIOPSY SINGLE OR MULTIPLE;  EGD;  Surgeon: Gonzalo Wahl MD;  Location:  GI     ESOPHAGOSCOPY, GASTROSCOPY, DUODENOSCOPY (EGD), COMBINED N/A 06/07/2019    Procedure: ESOPHAGOGASTRODUODENOSCOPY (EGD);  Surgeon: Gonzalo Wahl MD;  Location:  GI     FOOT SURGERY       HIP SURGERY       IR PARACENTESIS  10/29/2019     IR PARACENTESIS  11/25/2020     IR PARACENTESIS  8/11/2021     IR TRANSVEN INTRAHEPATIC PORTOSYST REV  10/29/2019     IR TRANSVEN INTRAHEPATIC PORTOSYST REV  11/25/2020     IR TRANSVEN INTRAHEPATIC PORTOSYST REV  8/11/2021     KNEE SURGERY Left      KNEE SURGERY Right      RELEASE CARPAL TUNNEL       RELEASE TRIGGER FINGER Right 06/11/2020    Procedure: RELEASE, TRIGGER FINGER, right ring and long finger;  Surgeon: Pascual Valencia MD;  Location: [facility] OR     SIGMOIDOSCOPY FLEXIBLE N/A 10/31/2017    Procedure: SIGMOIDOSCOPY FLEXIBLE;;  Surgeon: Armaan Adams MD;  Location:  GI     TIPS Procedure  06/06/2018     TIPS PROCEDURE  11/01/2020       ROS: 10 point review of systems negative except noted in HPI  PHYSICAL EXAM  General appearance- alert, and in no distress.  Neck- Neck is supple without obvious adenopathy.  Lungs- Respiratory effort unlabored.  Gait- Normal.  Abdomen - soft non distended, non tender with umbilical hernia with fat in defect. Overlying skin ok.    Impression: Low risk umbilical hernia in high risk patient.   Needs albumin to be > 3 and no paracentesis for 6 months.  Understands use of binder may achieve cosmetic result patient desires.  No surgical intervention at this point.  \"Total time " = 30 minutes, spent on the date of encounter doing chart review, history and physical, documentation, patient education, and any further activity as noted above.             Again, thank you for allowing me to participate in the care of your patient.      Sincerely,    Stanford Washington MD

## 2021-09-27 NOTE — NURSING NOTE
Chief Complaint   Patient presents with     RECHECK     follow up cirrhosis       BP (!) 147/82   Pulse 78   Temp 99  F (37.2  C)   Wt 78.5 kg (173 lb)   SpO2 99%   BMI 24.82 kg/m        Niki SEGAL, MARTINA

## 2021-09-27 NOTE — NURSING NOTE
"Chief Complaint   Patient presents with     Consult     Ivan, is being seen today for a consult regarding umbilical hernia.       Vitals:    09/27/21 1221   BP: (!) 145/74   BP Location: Left arm   Patient Position: Chair   Cuff Size: Adult Regular   Pulse: 68   Temp: 98.2  F (36.8  C)   TempSrc: Oral   SpO2: 100%   Weight: 78 kg (172 lb)   Height: 1.778 m (5' 10\")       Body mass index is 24.68 kg/m .      Citlali Naqvi LPN    "

## 2021-09-27 NOTE — PROGRESS NOTES
Hepatology Follow-up Clinic note  Ivan Bell   Date of Birth 1965  Date of Service 9/27/2021    Reason for follow-up: ETOH cirrhosis          Assessment/plan:   Ivan Bell is a 56 year old male with ETOH cirrhosis, complicated by ascites s/p TIPS recent revision (on 8/11/21) who still has some ascites on imaging yet. HE is well controlled with current regimen.     He has symptomatic umbilical hernia which is causing him a lot of pain and limits his ability to eat. Discussed surgery would be complicated by persistent ascites. Recommended waiting a period of time to see if ascites will resolve after recent TIPS revision. Will also increase diuretics.He continues to have mild liver dysfunction. MELD-Na 17. He is up to date with HCC screening and does not need variceal screening with patent TIPS. He has remained sober from alcohol and is awaiting CD recommendations.     # Ascites s/p TIPs:   - Labs pending at the time of visit today.   - Increase furosemide to 40 mg twice daily   - Increase eplerenone to 100 mg in morning and 50 mg night  - Repeat BMP in one weeks  - Continue high protein, 2000 mg sodium    # HCC screening, indeterminate nodule:   - Repeat MRI in 3 months     # Hepatic encephalopathy, controlled:   - Continue lactulose (dose to 3-5 BM day)   - Continue Rifaximin 550 mg twice daily     # Umbilical hernia:   - Sees Gen Surg later today     # History of alcohol abuse:   - Continue with treatment per CD recommendations     # Follow-up in clinic with Dr. Bales in 3 months or sooner as needed    Celestino Verduzco PA-C   [facility]    -----------------------------------------------------       HPI:   Ivan Bell is a 56 year old male presenting for follow-up.     Cirrhosis  - ETOH  - hx ascites, TIPS placement 5/14/18, 6/6/18; TIPS revision 10/29/19, 11/25/2020,   - hx HE  - hx variceal bleed Oct 2017  - ETOH hepatitis March 2019  - last EGD Jun 2019- dimin EV, mild  portal hypertensive gastropathy  - HCC screening-      Patient last saw Dr. Bales on 2/1/2021. No recent hospitalizations or ER visits.     Developed recurrent ascites and found to have increased velocities on US abdomen w/ doppler in August. TIPS revision on 8/11/2021. Fluid has improved after that. Recent MRI still showing mod to large volume ascites.      Appetite is fair. Pains in stomach when he eats. Usually have two to three bowel movements a day. No problems with confusion.     Currently taking Furosemide 40 mg and   Eplerone 50 mg and potassium supplement.   Weight is stable.     Patient denies jaundice, lower extremity edema. Patient also denies melena, hematochezia or hematemesis. Patient denies weight loss, fevers, sweats or chills.    Still awaiting CD recommendations. Working full time, does a lot of heavy lifting Avg 70-80 lbs.       Medical hx Surgical hx   Past Medical History:   Diagnosis Date     Alcoholic cirrhosis (H)      Alcoholic cirrhosis of liver (H)      Alcoholic hepatitis 03/2019     Arthritis      Chronic kidney disease      Depression      Gout      Gout      Hepatic encephalopathy (H)      History of transfusion      Hypertension      Hypertension      Hypertriglyceridemia      Left calcaneus fracture 1/9/2006     Nephrolithiasis      Portal vein thrombosis      Thrombocytopenia (H)     Past Surgical History:   Procedure Laterality Date     ANKLE SURGERY Left      ANKLE SURGERY       ARTHROSCOPY KNEE       ARTHROSCOPY SHOULDER       ARTHROSCOPY SHOULDER ROTATOR CUFF REPAIR       C PLASTY KNEE,MED OR LAT COMPARTMT Right 02/19/2021    Procedure: RIGHT UNICOMPARTMENTAL ARTHROPLASTY KNEE MEDIAL;  Surgeon: José Miguel Landaverde MD;  Location: [facility];  Service: Orthopedics     COLONOSCOPY N/A 03/31/2016    Procedure: COLONOSCOPY;  Surgeon: Rhys Uriostegui MD;  Location:  GI     ESOPHAGOSCOPY, GASTROSCOPY, DUODENOSCOPY (EGD), COMBINED N/A 03/31/2016    Procedure:  COMBINED ESOPHAGOSCOPY, GASTROSCOPY, DUODENOSCOPY (EGD);  Surgeon: Rhys Uriostegui MD;  Location:  GI     ESOPHAGOSCOPY, GASTROSCOPY, DUODENOSCOPY (EGD), COMBINED N/A 03/09/2018    Procedure: COMBINED ESOPHAGOSCOPY, GASTROSCOPY, DUODENOSCOPY (EGD), BIOPSY SINGLE OR MULTIPLE;  EGD;  Surgeon: Gonzalo Wahl MD;  Location:  GI     ESOPHAGOSCOPY, GASTROSCOPY, DUODENOSCOPY (EGD), COMBINED N/A 06/07/2019    Procedure: ESOPHAGOGASTRODUODENOSCOPY (EGD);  Surgeon: Gonzalo Wahl MD;  Location:  GI     FOOT SURGERY       HIP SURGERY       IR PARACENTESIS  10/29/2019     IR PARACENTESIS  11/25/2020     IR PARACENTESIS  8/11/2021     IR TRANSVEN INTRAHEPATIC PORTOSYST REV  10/29/2019     IR TRANSVEN INTRAHEPATIC PORTOSYST REV  11/25/2020     IR TRANSVEN INTRAHEPATIC PORTOSYST REV  8/11/2021     KNEE SURGERY Left      KNEE SURGERY Right      RELEASE CARPAL TUNNEL       RELEASE TRIGGER FINGER Right 06/11/2020    Procedure: RELEASE, TRIGGER FINGER, right ring and long finger;  Surgeon: Pascual Valencia MD;  Location: [facility]     SIGMOIDOSCOPY FLEXIBLE N/A 10/31/2017    Procedure: SIGMOIDOSCOPY FLEXIBLE;;  Surgeon: Armaan Adams MD;  Location:  GI     TIPS Procedure  06/06/2018     TIPS PROCEDURE  11/01/2020                 Medications:     Current Outpatient Medications   Medication     acetaminophen (TYLENOL) 500 MG tablet     Ascorbic Acid (VITAMIN C PO)     eplerenone (INSPRA) 50 MG tablet     fexofenadine (ALLEGRA) 60 MG tablet     furosemide (LASIX) 20 MG tablet     lactulose (CHRONULAC) 10 GM/15ML solution     Menaquinone-7 (VITAMIN K2) 100 MCG CAPS     MULTIPLE VITAMINS PO     potassium chloride ER (KLOR-CON M) 20 MEQ CR tablet     rifaximin (XIFAXAN) 550 MG TABS tablet     senna-docusate (SENOKOT-S/PERICOLACE) 8.6-50 MG tablet     sertraline (ZOLOFT) 50 MG tablet     sildenafil (REVATIO) 20 MG tablet     sodium bicarbonate 650 MG tablet     vitamin D3 (CHOLECALCIFEROL) 2000 units  (50 mcg) tablet     order for DME     oxyCODONE (ROXICODONE) 5 MG tablet     No current facility-administered medications for this visit.            Allergies:     Allergies   Allergen Reactions     Prednisone Visual Disturbance     Trazodone Visual Disturbance     Benadryl [Diphenhydramine] Other (See Comments)     Delirium (visual and auditory hallucinations)            Review of Systems:   10 points ROS was obtained and highlighted in the HPI, otherwise negative.          Physical Exam:   VS:  BP (!) 147/82   Pulse 78   Temp 99  F (37.2  C)   Wt 78.5 kg (173 lb)   SpO2 99%   BMI 24.82 kg/m      Gen: A&Ox3, NAD, well developed  HEENT: non-icteric  CV: RRR, no overt murmurs  Lung: CTA Bilatererally, no wheezing or crackles.   Lym- no palpable lymphadenopathy  Abd: soft, NT, ND, nonreducible umbilical hernia    Ext: no edema, intact pulses.   Skin: No rash, no palmar erythema, telangiectasias or jaundice  Neuro: grossly intact, no asterixis   Psych: appropriate mood and affects         Data:   Reviewed in person and significant for:    Lab Results   Component Value Date     09/03/2021     06/23/2021      Lab Results   Component Value Date    POTASSIUM 3.7 09/03/2021    POTASSIUM 4.0 06/23/2021     Lab Results   Component Value Date    CHLORIDE 114 09/03/2021    CHLORIDE 117 06/23/2021     Lab Results   Component Value Date    CO2 27 09/03/2021    CO2 25 06/23/2021     Lab Results   Component Value Date    BUN 10 09/03/2021    BUN 8 06/23/2021     Lab Results   Component Value Date    CR 1.23 09/03/2021    CR 1.09 06/23/2021       Lab Results   Component Value Date    WBC 5.6 08/11/2021    WBC 4.3 06/23/2021     Lab Results   Component Value Date    HGB 13.4 08/11/2021    HGB 12.1 06/23/2021     Lab Results   Component Value Date    HCT 41.0 08/11/2021    HCT 37.3 06/23/2021     Lab Results   Component Value Date     08/11/2021    MCV 99 06/23/2021     Lab Results   Component Value Date    PLT  67 08/11/2021    PLT 58 06/23/2021       Lab Results   Component Value Date    AST 30 08/11/2021    AST 33 06/23/2021     Lab Results   Component Value Date    ALT 22 08/11/2021    ALT 19 06/23/2021     Lab Results   Component Value Date    BILICONJ 0.1 03/30/2011      Lab Results   Component Value Date    BILITOTAL 3.0 08/11/2021    BILITOTAL 3.6 06/23/2021       Lab Results   Component Value Date    ALBUMIN 2.3 08/11/2021    ALBUMIN 2.7 06/23/2021     Lab Results   Component Value Date    PROTTOTAL 5.6 08/11/2021    PROTTOTAL 5.6 06/23/2021      Lab Results   Component Value Date    ALKPHOS 124 08/11/2021    ALKPHOS 165 06/23/2021       Lab Results   Component Value Date    INR 1.67 08/11/2021    INR 1.79 06/23/2021     MRI ABDOMEN:   9/16/2021:     IMPRESSION:  1. Segment 4A/4B nodule is technically indeterminant, though the  suggestion of enhancement at subtraction imaging may be artifactual  related to the pronounced motion artifact that limits this exam. There  are no other focal worrisome hepatic lesions noted. Recommend  surveillance MRI in 3 months. LI-RADS-NC (non categorizable).  2. Hepatic cirrhosis, and portal hypertension evidenced by  splenomegaly, moderate-to-large ascites, and vascular varices.

## 2021-09-29 ENCOUNTER — PATIENT OUTREACH (OUTPATIENT)
Dept: GASTROENTEROLOGY | Facility: CLINIC | Age: 56
End: 2021-09-29

## 2021-09-29 DIAGNOSIS — K70.31 ALCOHOLIC CIRRHOSIS OF LIVER WITH ASCITES (H): ICD-10-CM

## 2021-09-29 RX ORDER — FUROSEMIDE 40 MG
40 TABLET ORAL 2 TIMES DAILY
Qty: 180 TABLET | Refills: 3 | Status: SHIPPED | OUTPATIENT
Start: 2021-09-29 | End: 2021-10-08

## 2021-09-29 RX ORDER — EPLERENONE 50 MG/1
TABLET, FILM COATED ORAL
Qty: 270 TABLET | Refills: 3 | Status: SHIPPED | OUTPATIENT
Start: 2021-09-29 | End: 2022-03-07

## 2021-09-29 NOTE — PROGRESS NOTES
Unable to leave message on patient's cell. Left message on spouses cell with the following per Celestino Verduzco PA-C: If patient wants to go up on diuretics, we can.    Would go up to 40 mg furosemide twice daily and Eplerenone 100 mg morning and 50 mg afternoon.     Patient's spouse Elicia returned call and reviewed med changes. She notes that the patient has been taking furosemide 60 mg daily for about 1 month, along with the eplerenone 50 mg daily. Will review with provider and verify new doses. Patient will recheck labs in one week.

## 2021-10-07 ENCOUNTER — LAB (OUTPATIENT)
Dept: LAB | Facility: CLINIC | Age: 56
End: 2021-10-07
Payer: COMMERCIAL

## 2021-10-07 DIAGNOSIS — K70.31 ALCOHOLIC CIRRHOSIS OF LIVER WITH ASCITES (H): ICD-10-CM

## 2021-10-07 LAB
ANION GAP SERPL CALCULATED.3IONS-SCNC: 4 MMOL/L (ref 3–14)
BUN SERPL-MCNC: 12 MG/DL (ref 7–30)
CALCIUM SERPL-MCNC: 8.7 MG/DL (ref 8.5–10.1)
CHLORIDE BLD-SCNC: 112 MMOL/L (ref 94–109)
CO2 SERPL-SCNC: 26 MMOL/L (ref 20–32)
CREAT SERPL-MCNC: 1.29 MG/DL (ref 0.66–1.25)
GFR SERPL CREATININE-BSD FRML MDRD: 61 ML/MIN/1.73M2
GLUCOSE BLD-MCNC: 94 MG/DL (ref 70–99)
POTASSIUM BLD-SCNC: 3.3 MMOL/L (ref 3.4–5.3)
SODIUM SERPL-SCNC: 142 MMOL/L (ref 133–144)

## 2021-10-07 PROCEDURE — 36415 COLL VENOUS BLD VENIPUNCTURE: CPT | Performed by: PATHOLOGY

## 2021-10-07 PROCEDURE — 80048 BASIC METABOLIC PNL TOTAL CA: CPT | Mod: 90 | Performed by: PATHOLOGY

## 2021-10-08 ENCOUNTER — PATIENT OUTREACH (OUTPATIENT)
Dept: GASTROENTEROLOGY | Facility: CLINIC | Age: 56
End: 2021-10-08

## 2021-10-08 DIAGNOSIS — K70.31 ALCOHOLIC CIRRHOSIS OF LIVER WITH ASCITES (H): ICD-10-CM

## 2021-10-08 RX ORDER — FUROSEMIDE 40 MG
TABLET ORAL
Qty: 135 TABLET | Refills: 3 | Status: SHIPPED | OUTPATIENT
Start: 2021-10-08 | End: 2021-11-09

## 2021-10-08 NOTE — PROGRESS NOTES
Discussed changes per Celestino Catalan with both patient and spouse:   Decrease furosemide to 40 mg and 20 mg.  Continue same dose of epleronone 100 and 50 mg.   Continue same potassium supplement.     Patient states his weight and abdomen have been stable at about 160 lbs over the past week or so. Still with some distention but not to the point of feeling uncomfortable. He denies swelling in feet or ankles. Recheck BMP in 2 weeks.

## 2021-10-11 ENCOUNTER — MYC MEDICAL ADVICE (OUTPATIENT)
Dept: GASTROENTEROLOGY | Facility: CLINIC | Age: 56
End: 2021-10-11
Payer: COMMERCIAL

## 2021-10-14 DIAGNOSIS — K76.82 HEPATIC ENCEPHALOPATHY (H): ICD-10-CM

## 2021-10-14 DIAGNOSIS — K70.31 ALCOHOLIC CIRRHOSIS OF LIVER WITH ASCITES (H): ICD-10-CM

## 2021-10-14 RX ORDER — LACTULOSE 10 G/15ML
SOLUTION ORAL
Qty: 2000 ML | Refills: 11 | Status: SHIPPED | OUTPATIENT
Start: 2021-10-14 | End: 2022-11-16

## 2021-10-20 ENCOUNTER — PATIENT OUTREACH (OUTPATIENT)
Dept: GASTROENTEROLOGY | Facility: CLINIC | Age: 56
End: 2021-10-20

## 2021-10-20 DIAGNOSIS — K76.82 HEPATIC ENCEPHALOPATHY (H): ICD-10-CM

## 2021-10-20 NOTE — PROGRESS NOTES
Patient called for refill of Xifaxan to be sent to [facility]. He will be heading out of town again in the AM and will not be able to get labs done until next week. He will schedule. Patient denies any concerns or issues at this time.

## 2021-10-27 ENCOUNTER — LAB (OUTPATIENT)
Dept: LAB | Facility: CLINIC | Age: 56
End: 2021-10-27
Payer: COMMERCIAL

## 2021-10-27 DIAGNOSIS — K70.31 ALCOHOLIC CIRRHOSIS OF LIVER WITH ASCITES (H): ICD-10-CM

## 2021-10-27 LAB
ANION GAP SERPL CALCULATED.3IONS-SCNC: 6 MMOL/L (ref 3–14)
BUN SERPL-MCNC: 11 MG/DL (ref 7–30)
CALCIUM SERPL-MCNC: 8.8 MG/DL (ref 8.5–10.1)
CHLORIDE BLD-SCNC: 116 MMOL/L (ref 94–109)
CO2 SERPL-SCNC: 24 MMOL/L (ref 20–32)
CREAT SERPL-MCNC: 1.21 MG/DL (ref 0.66–1.25)
GFR SERPL CREATININE-BSD FRML MDRD: 66 ML/MIN/1.73M2
GLUCOSE BLD-MCNC: 182 MG/DL (ref 70–99)
POTASSIUM BLD-SCNC: 3.4 MMOL/L (ref 3.4–5.3)
SODIUM SERPL-SCNC: 146 MMOL/L (ref 133–144)

## 2021-10-27 PROCEDURE — 80048 BASIC METABOLIC PNL TOTAL CA: CPT | Performed by: PATHOLOGY

## 2021-10-27 PROCEDURE — 36415 COLL VENOUS BLD VENIPUNCTURE: CPT | Performed by: PATHOLOGY

## 2021-11-03 ENCOUNTER — TELEPHONE (OUTPATIENT)
Dept: BEHAVIORAL HEALTH | Facility: CLINIC | Age: 56
End: 2021-11-03
Payer: COMMERCIAL

## 2021-11-03 NOTE — TELEPHONE ENCOUNTER
11/3/2021  I mailed out of a copy of the recommendation letter today to pt.    Recommendations are:  1)  Attend 6 individual psychotherapy sessions with a therapist and then follow their recommendations.  2)  Abstain from all mood-altering chemicals unless prescribed by a licensed provider.   3)  You are strongly encouraged to attend one weekly sober support group meeting, such as 12 step based (AA/NA), SM[facility] Recovery, [facility], [facility], [facility], [place] [facility] meetings.     4)  Follow all the recommendations of your treatment/medical providers.     To find a therapist, you can contact your insurance company to see who is in network. You can schedule therapy through [facility] by calling [phone]. Other options can include [facility]: [email or web address]

## 2021-11-08 ENCOUNTER — TELEPHONE (OUTPATIENT)
Dept: GASTROENTEROLOGY | Facility: CLINIC | Age: 56
End: 2021-11-08
Payer: COMMERCIAL

## 2021-11-09 ENCOUNTER — TELEPHONE (OUTPATIENT)
Dept: GASTROENTEROLOGY | Facility: CLINIC | Age: 56
End: 2021-11-09
Payer: COMMERCIAL

## 2021-11-09 DIAGNOSIS — K70.31 ALCOHOLIC CIRRHOSIS OF LIVER WITH ASCITES (H): ICD-10-CM

## 2021-11-09 RX ORDER — FUROSEMIDE 40 MG
40 TABLET ORAL EVERY MORNING
Qty: 90 TABLET | Refills: 3 | Status: SHIPPED | OUTPATIENT
Start: 2021-11-09 | End: 2022-09-07

## 2021-11-09 RX ORDER — FUROSEMIDE 20 MG
20 TABLET ORAL EVERY EVENING
Qty: 90 TABLET | Refills: 3 | Status: ON HOLD | OUTPATIENT
Start: 2021-11-09 | End: 2022-07-01

## 2021-11-09 NOTE — PROGRESS NOTES
Patient prefers whole tablets for lasix dosing due to difficulties cutting/breaking the tablets. 2 prescriptions sent, lasix 40 mg AM and lasix 20 mg PM.

## 2021-11-17 ENCOUNTER — PATIENT OUTREACH (OUTPATIENT)
Dept: GASTROENTEROLOGY | Facility: CLINIC | Age: 56
End: 2021-11-17
Payer: COMMERCIAL

## 2021-11-17 DIAGNOSIS — K70.31 ALCOHOLIC CIRRHOSIS OF LIVER WITH ASCITES (H): Primary | ICD-10-CM

## 2021-11-18 NOTE — PROGRESS NOTES
Patient heading out of town and called to get scheduled for follow up with Dr. Bales in December. He will have lab work done ahead of time at his local clinic, orders placed. Scheduled 12/7 at 0830 in person visit with Dr. Bales. Patient has no further concerns at this time.

## 2021-12-07 ENCOUNTER — OFFICE VISIT (OUTPATIENT)
Dept: GASTROENTEROLOGY | Facility: CLINIC | Age: 56
End: 2021-12-07
Attending: INTERNAL MEDICINE
Payer: COMMERCIAL

## 2021-12-07 ENCOUNTER — PATIENT OUTREACH (OUTPATIENT)
Dept: GASTROENTEROLOGY | Facility: CLINIC | Age: 56
End: 2021-12-07

## 2021-12-07 ENCOUNTER — LAB (OUTPATIENT)
Dept: LAB | Facility: CLINIC | Age: 56
End: 2021-12-07
Payer: COMMERCIAL

## 2021-12-07 VITALS
OXYGEN SATURATION: 99 % | DIASTOLIC BLOOD PRESSURE: 81 MMHG | BODY MASS INDEX: 24.82 KG/M2 | SYSTOLIC BLOOD PRESSURE: 152 MMHG | HEART RATE: 73 BPM | WEIGHT: 173 LBS

## 2021-12-07 DIAGNOSIS — K70.31 ALCOHOLIC CIRRHOSIS OF LIVER WITH ASCITES (H): Primary | ICD-10-CM

## 2021-12-07 DIAGNOSIS — K70.31 ALCOHOLIC CIRRHOSIS OF LIVER WITH ASCITES (H): ICD-10-CM

## 2021-12-07 LAB
ALBUMIN SERPL-MCNC: 1.9 G/DL (ref 3.4–5)
ALP SERPL-CCNC: 142 U/L (ref 40–150)
ALT SERPL W P-5'-P-CCNC: 23 U/L (ref 0–70)
ANION GAP SERPL CALCULATED.3IONS-SCNC: 7 MMOL/L (ref 3–14)
AST SERPL W P-5'-P-CCNC: 37 U/L (ref 0–45)
BILIRUB DIRECT SERPL-MCNC: 1 MG/DL (ref 0–0.2)
BILIRUB SERPL-MCNC: 2.3 MG/DL (ref 0.2–1.3)
BUN SERPL-MCNC: 14 MG/DL (ref 7–30)
CALCIUM SERPL-MCNC: 8.2 MG/DL (ref 8.5–10.1)
CHLORIDE BLD-SCNC: 115 MMOL/L (ref 94–109)
CO2 SERPL-SCNC: 24 MMOL/L (ref 20–32)
CREAT SERPL-MCNC: 1.31 MG/DL (ref 0.66–1.25)
ERYTHROCYTE [DISTWIDTH] IN BLOOD BY AUTOMATED COUNT: 16.3 % (ref 10–15)
GFR SERPL CREATININE-BSD FRML MDRD: 60 ML/MIN/1.73M2
GLUCOSE BLD-MCNC: 124 MG/DL (ref 70–99)
HCT VFR BLD AUTO: 33.9 % (ref 40–53)
HGB BLD-MCNC: 11.4 G/DL (ref 13.3–17.7)
INR PPP: 1.83 (ref 0.85–1.15)
MCH RBC QN AUTO: 32.6 PG (ref 26.5–33)
MCHC RBC AUTO-ENTMCNC: 33.6 G/DL (ref 31.5–36.5)
MCV RBC AUTO: 97 FL (ref 78–100)
PLATELET # BLD AUTO: 60 10E3/UL (ref 150–450)
POTASSIUM BLD-SCNC: 3.8 MMOL/L (ref 3.4–5.3)
PROT SERPL-MCNC: 5.5 G/DL (ref 6.8–8.8)
RBC # BLD AUTO: 3.5 10E6/UL (ref 4.4–5.9)
SODIUM SERPL-SCNC: 146 MMOL/L (ref 133–144)
WBC # BLD AUTO: 3.9 10E3/UL (ref 4–11)

## 2021-12-07 PROCEDURE — 82248 BILIRUBIN DIRECT: CPT | Performed by: PATHOLOGY

## 2021-12-07 PROCEDURE — 36415 COLL VENOUS BLD VENIPUNCTURE: CPT | Performed by: PATHOLOGY

## 2021-12-07 PROCEDURE — 80053 COMPREHEN METABOLIC PANEL: CPT | Performed by: PATHOLOGY

## 2021-12-07 PROCEDURE — 85610 PROTHROMBIN TIME: CPT | Performed by: PATHOLOGY

## 2021-12-07 PROCEDURE — 85027 COMPLETE CBC AUTOMATED: CPT | Performed by: PATHOLOGY

## 2021-12-07 PROCEDURE — 99215 OFFICE O/P EST HI 40 MIN: CPT | Performed by: INTERNAL MEDICINE

## 2021-12-07 RX ORDER — ALBUTEROL SULFATE 0.83 MG/ML
2.5 SOLUTION RESPIRATORY (INHALATION)
Status: CANCELLED | OUTPATIENT
Start: 2021-12-07

## 2021-12-07 RX ORDER — HEPARIN SODIUM,PORCINE 10 UNIT/ML
5 VIAL (ML) INTRAVENOUS
Status: CANCELLED | OUTPATIENT
Start: 2021-12-07

## 2021-12-07 RX ORDER — METHYLPREDNISOLONE SODIUM SUCCINATE 125 MG/2ML
125 INJECTION, POWDER, LYOPHILIZED, FOR SOLUTION INTRAMUSCULAR; INTRAVENOUS
Status: CANCELLED
Start: 2021-12-07

## 2021-12-07 RX ORDER — ALBUMIN (HUMAN) 12.5 G/50ML
12.5 SOLUTION INTRAVENOUS
Status: CANCELLED | OUTPATIENT
Start: 2021-12-07

## 2021-12-07 RX ORDER — EPINEPHRINE 1 MG/ML
0.3 INJECTION, SOLUTION, CONCENTRATE INTRAVENOUS EVERY 5 MIN PRN
Status: CANCELLED | OUTPATIENT
Start: 2021-12-07

## 2021-12-07 RX ORDER — LIDOCAINE HYDROCHLORIDE 10 MG/ML
20 INJECTION, SOLUTION EPIDURAL; INFILTRATION; INTRACAUDAL; PERINEURAL ONCE
Status: CANCELLED | OUTPATIENT
Start: 2021-12-07 | End: 2021-12-07

## 2021-12-07 RX ORDER — NALOXONE HYDROCHLORIDE 0.4 MG/ML
0.2 INJECTION, SOLUTION INTRAMUSCULAR; INTRAVENOUS; SUBCUTANEOUS
Status: CANCELLED | OUTPATIENT
Start: 2021-12-07

## 2021-12-07 RX ORDER — HEPARIN SODIUM (PORCINE) LOCK FLUSH IV SOLN 100 UNIT/ML 100 UNIT/ML
5 SOLUTION INTRAVENOUS
Status: CANCELLED | OUTPATIENT
Start: 2021-12-07

## 2021-12-07 RX ORDER — MEPERIDINE HYDROCHLORIDE 25 MG/ML
25 INJECTION INTRAMUSCULAR; INTRAVENOUS; SUBCUTANEOUS EVERY 30 MIN PRN
Status: CANCELLED | OUTPATIENT
Start: 2021-12-07

## 2021-12-07 RX ORDER — ALBUTEROL SULFATE 90 UG/1
1-2 AEROSOL, METERED RESPIRATORY (INHALATION)
Status: CANCELLED
Start: 2021-12-07

## 2021-12-07 RX ORDER — DIPHENHYDRAMINE HYDROCHLORIDE 50 MG/ML
50 INJECTION INTRAMUSCULAR; INTRAVENOUS
Status: CANCELLED
Start: 2021-12-07

## 2021-12-07 NOTE — LETTER
12/7/2021    RE: Ivan Bell  [id number] [address] [id number]    Dear Colleague,    Thank you for referring your patient, Ivan Bell, to the [facility] [place]. Please see a copy of my visit note below.    [facility] Hepatology    Follow-up Visit    Follow-up visit for cirrhosis    Subjective:  56 year old male    Cirrhosis  - ETOH  - hx ascites, TIPS placement 5/14/18, 6/6/18; TIPS revision 10/29/19, 11/25/2020, 8/11/2021  - hx HE  - hx variceal bleed Oct 2017  - ETOH hepatitis March 2019  - last EGD Jun 2019- diminutive EV, mild portal hypertensive gastropathy  - HCC screening- MRI liver Sep 2021    The patient comes to clinic with his wife this morning.  Last visit 09/21/2021 with PA.  The patient saw general surgery regarding umbilical hernia, who did not want to pursue surgery.  No new medications, ER visits or hospital admissions since last clinic visit.    The patient feels well today.  He denies any symptoms related to liver disease.    The patient denies abdominal distention, although his wife thinks that his abdomen has become increasingly distended over the past few weeks.  The patient denies lower extremity edema.  He is adhering to a low-sodium diet and his current diuretics.    The patient denies jaundice, lethargy or confusion.  Bowels are moving regularly on lactulose.    The patient denies melena, hematemesis or hematochezia.    The patient denies any fever, sweats or chills.  He is fully vaccinated against COVID-19, including his booster.    Weight has been stable.  Appetite is normal.    The patient does not drink alcohol.  He attended chemical dependency evaluation in 09/2021, but did not follow up on recommendations.  A letter was sent out with recommendations in 11/2021, which included attending 6 individual psychotherapy sessions and enrolling in a meeting.      Medical hx Surgical hx   Past Medical History:   Diagnosis Date     Alcoholic  cirrhosis of liver (H)      Alcoholic hepatitis 03/2019     Chronic kidney disease     CRF     Depression      Gout      History of transfusion      Hypertension      Hypertension      Hypertriglyceridemia      Left calcaneus fracture 01/09/2006 January 16, 2006: Fell 10 feet from ladder onto left foot on frozen ground on 1/9/06 at home.  Immediate pain and unable to walk- seen at [place] and diagnosed with calcaneus fracture     Nephrolithiasis      Osteoarthritis      Portal vein thrombosis     left occlusion, partial main      Past Surgical History:   Procedure Laterality Date     ANKLE SURGERY Left      ANKLE SURGERY       ARTHROSCOPY KNEE       ARTHROSCOPY SHOULDER       ARTHROSCOPY SHOULDER ROTATOR CUFF REPAIR       C PLASTY KNEE,MED OR LAT COMPARTMT Right 02/19/2021    Procedure: RIGHT UNICOMPARTMENTAL ARTHROPLASTY KNEE MEDIAL;  Surgeon: José Miguel Landaverde MD;  Location: [facility];  Service: Orthopedics     COLONOSCOPY N/A 03/31/2016    Procedure: COLONOSCOPY;  Surgeon: Rhys Uriostegui MD;  Location:  GI     ESOPHAGOSCOPY, GASTROSCOPY, DUODENOSCOPY (EGD), COMBINED N/A 03/31/2016    Procedure: COMBINED ESOPHAGOSCOPY, GASTROSCOPY, DUODENOSCOPY (EGD);  Surgeon: Rhys Uriostegui MD;  Location:  GI     ESOPHAGOSCOPY, GASTROSCOPY, DUODENOSCOPY (EGD), COMBINED N/A 03/09/2018    Procedure: COMBINED ESOPHAGOSCOPY, GASTROSCOPY, DUODENOSCOPY (EGD), BIOPSY SINGLE OR MULTIPLE;  EGD;  Surgeon: Gonzalo Wahl MD;  Location:  GI     ESOPHAGOSCOPY, GASTROSCOPY, DUODENOSCOPY (EGD), COMBINED N/A 06/07/2019    Procedure: ESOPHAGOGASTRODUODENOSCOPY (EGD);  Surgeon: Gonzalo Wahl MD;  Location:  GI     FOOT SURGERY       HIP SURGERY       IR PARACENTESIS  10/29/2019     IR PARACENTESIS  11/25/2020     IR PARACENTESIS  8/11/2021     IR TRANSVEN INTRAHEPATIC PORTOSYST REV  10/29/2019     IR TRANSVEN INTRAHEPATIC PORTOSYST REV  11/25/2020     IR TRANSVEN INTRAHEPATIC  PORTOSYST REV  8/11/2021     KNEE SURGERY Left      KNEE SURGERY Right      RELEASE CARPAL TUNNEL       RELEASE TRIGGER FINGER Right 06/11/2020    Procedure: RELEASE, TRIGGER FINGER, right ring and long finger;  Surgeon: Pascual Valencia MD;  Location: [facility]     SIGMOIDOSCOPY FLEXIBLE N/A 10/31/2017    Procedure: SIGMOIDOSCOPY FLEXIBLE;;  Surgeon: Armaan Adams MD;  Location: [facility] GI     TIPS Procedure  06/06/2018     TIPS PROCEDURE  11/01/2020          Medications  Prior to Admission medications    Medication Sig Start Date End Date Taking? Authorizing Provider   acetaminophen (TYLENOL) 500 MG tablet Take 1,000 mg by mouth every 6 hours as needed for mild pain   Yes Reported, Patient   Ascorbic Acid (VITAMIN C PO) Take by mouth daily   Yes Reported, Patient   eplerenone (INSPRA) 50 MG tablet Take 2 tablets (100 mg) by mouth every morning AND 1 tablet (50 mg) every evening. 9/29/21  Yes Celestino Verduzco PA-C   furosemide (LASIX) 20 MG tablet Take 1 tablet (20 mg) by mouth every evening 11/9/21  Yes Gonzalo Wahl MD   furosemide (LASIX) 40 MG tablet Take 1 tablet (40 mg) by mouth every morning 11/9/21  Yes Gonzalo Wahl MD   lactulose (CHRONULAC) 10 GM/15ML solution TAKE 30MLS BY MOUTH THREE TIMES DAILY AS NEEDED FOR CONSTIPATION (TAKE AS NEEDED TO MAINTAIN 3 TO 4 BOWEL MOVEMENTS DAILY) 10/14/21  Yes Gonzalo Wahl MD   Menaquinone-7 (VITAMIN K2) 100 MCG CAPS Take 200 mcg by mouth daily    Yes Reported, Patient   MULTIPLE VITAMINS PO Take 1 tablet by mouth daily   Yes Reported, Patient   potassium chloride ER (KLOR-CON M) 20 MEQ CR tablet Take 2 tablets (40 mEq) by mouth daily 9/8/21  Yes Celestino Verduzco PA-C   rifaximin (XIFAXAN) 550 MG TABS tablet Take 1 tablet (550 mg) by mouth 2 times daily 10/20/21  Yes Celestino Verduzco PA-C   sertraline (ZOLOFT) 50 MG tablet TAKE 1/2 A TABLET BY MOUTH ONCE DAILY 2/10/21  Yes Gonzalo Wahl MD   sodium bicarbonate 650  MG tablet Take 1 tablet (650 mg) by mouth 2 times daily 8/10/21  Yes Alma Moses MD   vitamin D3 (CHOLECALCIFEROL) 2000 units (50 mcg) tablet Take 1 tablet by mouth daily   Yes Unknown, Entered By History   fexofenadine (ALLEGRA) 60 MG tablet Take 1 tablet (60 mg) by mouth daily  Patient not taking: Reported on 12/7/2021 4/17/20   Citlali Morgan PA-C   order for DME Equipment being ordered: patellar stabilization knee brace with horseshoe, Large  Patient not taking: Reported on 3/26/2021 7/16/20   Edna Kelley MD   sildenafil (REVATIO) 20 MG tablet Take 3-5 tabs 1/2-4 hours to relations  Patient not taking: Reported on 12/7/2021 2/11/21   Cristopher Arriaga PA-C       Allergies  Allergies   Allergen Reactions     Prednisone Visual Disturbance     Trazodone Visual Disturbance     Benadryl [Diphenhydramine] Other (See Comments)     Delirium (visual and auditory hallucinations)       Review of systems  A 10-point review of systems was negative    Examination  BP (!) 152/81   Pulse 73   Wt 78.5 kg (173 lb)   SpO2 99%   BMI 24.82 kg/m    Body mass index is 24.82 kg/m .    Gen- well, NAD, A+Ox3, normal color  CVS- RRR  RS- CTA  Abd- distended, soft, non-tender, dull to percuss, small reducible umbilical hernia, no obvious organomegaly on palpation or percussion, BS+  Extr- hands normal, no BESSY  Skin- no rash or jaundice  Neuro- no asterixis  Psych- normal mood    Laboratory  Lab Results   Component Value Date     12/07/2021     06/23/2021    POTASSIUM 3.8 12/07/2021    POTASSIUM 4.0 06/23/2021    CHLORIDE 115 12/07/2021    CHLORIDE 117 06/23/2021    CO2 24 12/07/2021    CO2 25 06/23/2021    BUN 14 12/07/2021    BUN 8 06/23/2021    CR 1.31 12/07/2021    CR 1.09 06/23/2021       Lab Results   Component Value Date    BILITOTAL 2.3 12/07/2021    BILITOTAL 3.6 06/23/2021    ALT 23 12/07/2021    ALT 19 06/23/2021    AST 37 12/07/2021    AST 33 06/23/2021    ALKPHOS 142 12/07/2021     ALKPHOS 165 06/23/2021       Lab Results   Component Value Date    ALBUMIN 1.9 12/07/2021    ALBUMIN 2.7 06/23/2021    PROTTOTAL 5.5 12/07/2021    PROTTOTAL 5.6 06/23/2021        Lab Results   Component Value Date    WBC 3.9 12/07/2021    WBC 4.3 06/23/2021    HGB 11.4 12/07/2021    HGB 12.1 06/23/2021    MCV 97 12/07/2021    MCV 99 06/23/2021    PLT 60 12/07/2021    PLT 58 06/23/2021       Lab Results   Component Value Date    INR 1.83 12/07/2021    INR 1.79 06/23/2021       Radiology  MRI liver 9/16/2021 reviewed- 1cm indeterminant nodule in segment 4a/b    Assessment  56-year-old male who presents for follow-up of decompensated alcoholic cirrhosis complicated with ascites and hepatic encephalopathy.  MELD-Na= 19 (increased).  Last ETOH= 02/2019.  Evidence of ascites on physical exam therefore will discuss with IR regarding TIPS revision.  Will need MRI liver to follow up on liver nodule initially identified in August.  Will obtain therapeutic paracentesis beforehand.  Hepatic encephalopathy is well-controlled on current medications.  Will plan to refer for second opinion regarding umbilical hernia repair if liver function is stable and no evidence of HCC on upcoming MRI.    We discussed the differential diagnosis for nodule on MRI liver including HCC, management of HCC including liver transplant evaluation and the importance of starting chemical dependency treatment prior to initiation of the liver transplant evaluation.    Plan  1.  Therapeutic paracentesis  2.  Discuss with IR re TIPS revision  3.  MRI liver  4.  Chemical dependency treatment  5.  Low Na diet  6.  Continue diuretics at current doses  7.  Continue lactulose and rifaximin  8.  Follow-up in 3 months    Gonzalo Bales MD  Hepatology  [facility]    I spent 40 minutes on the date of the encounter doing chart review, history and exam, documentation and further activities as noted above.

## 2021-12-07 NOTE — PROGRESS NOTES
[facility]    Follow-up Visit    Follow-up visit for cirrhosis    Subjective:  56 year old male    Cirrhosis  - ETOH  - hx ascites, TIPS placement 5/14/18, 6/6/18; TIPS revision 10/29/19, 11/25/2020, 8/11/2021  - hx HE  - hx variceal bleed Oct 2017  - ETOH hepatitis March 2019  - last EGD Jun 2019- diminutive EV, mild portal hypertensive gastropathy  - HCC screening- MRI liver Sep 2021    The patient comes to clinic with his wife this morning.  Last visit 09/21/2021 with PA.  The patient saw general surgery regarding umbilical hernia, who did not want to pursue surgery.  No new medications, ER visits or hospital admissions since last clinic visit.    The patient feels well today.  He denies any symptoms related to liver disease.    The patient denies abdominal distention, although his wife thinks that his abdomen has become increasingly distended over the past few weeks.  The patient denies lower extremity edema.  He is adhering to a low-sodium diet and his current diuretics.    The patient denies jaundice, lethargy or confusion.  Bowels are moving regularly on lactulose.    The patient denies melena, hematemesis or hematochezia.    The patient denies any fever, sweats or chills.  He is fully vaccinated against COVID-19, including his booster.    Weight has been stable.  Appetite is normal.    The patient does not drink alcohol.  He attended chemical dependency evaluation in 09/2021, but did not follow up on recommendations.  A letter was sent out with recommendations in 11/2021, which included attending 6 individual psychotherapy sessions and enrolling in a meeting.      Medical hx Surgical hx   Past Medical History:   Diagnosis Date     Alcoholic cirrhosis of liver (H)      Alcoholic hepatitis 03/2019     Chronic kidney disease     CRF     Depression      Gout      History of transfusion      Hypertension      Hypertension      Hypertriglyceridemia      Left calcaneus fracture 01/09/2006     January 16, 2006: Fell 10 feet from ladder onto left foot on frozen ground on 1/9/06 at home.  Immediate pain and unable to walk- seen at [place] and diagnosed with calcaneus fracture     Nephrolithiasis      Osteoarthritis      Portal vein thrombosis     left occlusion, partial main      Past Surgical History:   Procedure Laterality Date     ANKLE SURGERY Left      ANKLE SURGERY       ARTHROSCOPY KNEE       ARTHROSCOPY SHOULDER       ARTHROSCOPY SHOULDER ROTATOR CUFF REPAIR       C PLASTY KNEE,MED OR LAT COMPARTMT Right 02/19/2021    Procedure: RIGHT UNICOMPARTMENTAL ARTHROPLASTY KNEE MEDIAL;  Surgeon: José Miguel Landaverde MD;  Location: [facility];  Service: Orthopedics     COLONOSCOPY N/A 03/31/2016    Procedure: COLONOSCOPY;  Surgeon: Rhys Uriostegui MD;  Location:  GI     ESOPHAGOSCOPY, GASTROSCOPY, DUODENOSCOPY (EGD), COMBINED N/A 03/31/2016    Procedure: COMBINED ESOPHAGOSCOPY, GASTROSCOPY, DUODENOSCOPY (EGD);  Surgeon: Rhys Uriostegui MD;  Location:  GI     ESOPHAGOSCOPY, GASTROSCOPY, DUODENOSCOPY (EGD), COMBINED N/A 03/09/2018    Procedure: COMBINED ESOPHAGOSCOPY, GASTROSCOPY, DUODENOSCOPY (EGD), BIOPSY SINGLE OR MULTIPLE;  EGD;  Surgeon: Gonzalo Wahl MD;  Location:  GI     ESOPHAGOSCOPY, GASTROSCOPY, DUODENOSCOPY (EGD), COMBINED N/A 06/07/2019    Procedure: ESOPHAGOGASTRODUODENOSCOPY (EGD);  Surgeon: Gonzalo Wahl MD;  Location:  GI     FOOT SURGERY       HIP SURGERY       IR PARACENTESIS  10/29/2019     IR PARACENTESIS  11/25/2020     IR PARACENTESIS  8/11/2021     IR TRANSVEN INTRAHEPATIC PORTOSYST REV  10/29/2019     IR TRANSVEN INTRAHEPATIC PORTOSYST REV  11/25/2020     IR TRANSVEN INTRAHEPATIC PORTOSYST REV  8/11/2021     KNEE SURGERY Left      KNEE SURGERY Right      RELEASE CARPAL TUNNEL       RELEASE TRIGGER FINGER Right 06/11/2020    Procedure: RELEASE, TRIGGER FINGER, right ring and long finger;  Surgeon: Pascual Valencia MD;   Location: [facility] OR     SIGMOIDOSCOPY FLEXIBLE N/A 10/31/2017    Procedure: SIGMOIDOSCOPY FLEXIBLE;;  Surgeon: Armaan Adams MD;  Location: [facility] GI     TIPS Procedure  06/06/2018     TIPS PROCEDURE  11/01/2020          Medications  Prior to Admission medications    Medication Sig Start Date End Date Taking? Authorizing Provider   acetaminophen (TYLENOL) 500 MG tablet Take 1,000 mg by mouth every 6 hours as needed for mild pain   Yes Reported, Patient   Ascorbic Acid (VITAMIN C PO) Take by mouth daily   Yes Reported, Patient   eplerenone (INSPRA) 50 MG tablet Take 2 tablets (100 mg) by mouth every morning AND 1 tablet (50 mg) every evening. 9/29/21  Yes Celestino Verduzco PA-C   furosemide (LASIX) 20 MG tablet Take 1 tablet (20 mg) by mouth every evening 11/9/21  Yes Gonzalo Wahl MD   furosemide (LASIX) 40 MG tablet Take 1 tablet (40 mg) by mouth every morning 11/9/21  Yes Gonzalo Wahl MD   lactulose (CHRONULAC) 10 GM/15ML solution TAKE 30MLS BY MOUTH THREE TIMES DAILY AS NEEDED FOR CONSTIPATION (TAKE AS NEEDED TO MAINTAIN 3 TO 4 BOWEL MOVEMENTS DAILY) 10/14/21  Yes Gonzalo Wahl MD   Menaquinone-7 (VITAMIN K2) 100 MCG CAPS Take 200 mcg by mouth daily    Yes Reported, Patient   MULTIPLE VITAMINS PO Take 1 tablet by mouth daily   Yes Reported, Patient   potassium chloride ER (KLOR-CON M) 20 MEQ CR tablet Take 2 tablets (40 mEq) by mouth daily 9/8/21  Yes Celestino Verduzco PA-C   rifaximin (XIFAXAN) 550 MG TABS tablet Take 1 tablet (550 mg) by mouth 2 times daily 10/20/21  Yes Celestino Verduzco PA-C   sertraline (ZOLOFT) 50 MG tablet TAKE 1/2 A TABLET BY MOUTH ONCE DAILY 2/10/21  Yes Gonzalo Wahl MD   sodium bicarbonate 650 MG tablet Take 1 tablet (650 mg) by mouth 2 times daily 8/10/21  Yes Alma Moses MD   vitamin D3 (CHOLECALCIFEROL) 2000 units (50 mcg) tablet Take 1 tablet by mouth daily   Yes Unknown, Entered By History   fexofenadine (ALLEGRA) 60 MG  tablet Take 1 tablet (60 mg) by mouth daily  Patient not taking: Reported on 12/7/2021 4/17/20   Citlali Morgan PA-C   order for DME Equipment being ordered: patellar stabilization knee brace with horseshoe, Large  Patient not taking: Reported on 3/26/2021 7/16/20   Edna Kelley MD   sildenafil (REVATIO) 20 MG tablet Take 3-5 tabs 1/2-4 hours to relations  Patient not taking: Reported on 12/7/2021 2/11/21   Cristopher Arriaga PA-C       Allergies  Allergies   Allergen Reactions     Prednisone Visual Disturbance     Trazodone Visual Disturbance     Benadryl [Diphenhydramine] Other (See Comments)     Delirium (visual and auditory hallucinations)       Review of systems  A 10-point review of systems was negative    Examination  BP (!) 152/81   Pulse 73   Wt 78.5 kg (173 lb)   SpO2 99%   BMI 24.82 kg/m    Body mass index is 24.82 kg/m .    Gen- well, NAD, A+Ox3, normal color  CVS- RRR  RS- CTA  Abd- distended, soft, non-tender, dull to percuss, small reducible umbilical hernia, no obvious organomegaly on palpation or percussion, BS+  Extr- hands normal, no BESSY  Skin- no rash or jaundice  Neuro- no asterixis  Psych- normal mood    Laboratory  Lab Results   Component Value Date     12/07/2021     06/23/2021    POTASSIUM 3.8 12/07/2021    POTASSIUM 4.0 06/23/2021    CHLORIDE 115 12/07/2021    CHLORIDE 117 06/23/2021    CO2 24 12/07/2021    CO2 25 06/23/2021    BUN 14 12/07/2021    BUN 8 06/23/2021    CR 1.31 12/07/2021    CR 1.09 06/23/2021       Lab Results   Component Value Date    BILITOTAL 2.3 12/07/2021    BILITOTAL 3.6 06/23/2021    ALT 23 12/07/2021    ALT 19 06/23/2021    AST 37 12/07/2021    AST 33 06/23/2021    ALKPHOS 142 12/07/2021    ALKPHOS 165 06/23/2021       Lab Results   Component Value Date    ALBUMIN 1.9 12/07/2021    ALBUMIN 2.7 06/23/2021    PROTTOTAL 5.5 12/07/2021    PROTTOTAL 5.6 06/23/2021        Lab Results   Component Value Date    WBC 3.9 12/07/2021    WBC  4.3 06/23/2021    HGB 11.4 12/07/2021    HGB 12.1 06/23/2021    MCV 97 12/07/2021    MCV 99 06/23/2021    PLT 60 12/07/2021    PLT 58 06/23/2021       Lab Results   Component Value Date    INR 1.83 12/07/2021    INR 1.79 06/23/2021       Radiology  MRI liver 9/16/2021 reviewed- 1cm indeterminant nodule in segment 4a/b    Assessment  56-year-old male who presents for follow-up of decompensated alcoholic cirrhosis complicated with ascites and hepatic encephalopathy.  MELD-Na= 19 (increased).  Last ETOH= 02/2019.  Evidence of ascites on physical exam therefore will discuss with IR regarding TIPS revision.  Will need MRI liver to follow up on liver nodule initially identified in August.  Will obtain therapeutic paracentesis beforehand.  Hepatic encephalopathy is well-controlled on current medications.  Will plan to refer for second opinion regarding umbilical hernia repair if liver function is stable and no evidence of HCC on upcoming MRI.    We discussed the differential diagnosis for nodule on MRI liver including HCC, management of HCC including liver transplant evaluation and the importance of starting chemical dependency treatment prior to initiation of the liver transplant evaluation.    Plan  1.  Therapeutic paracentesis  2.  Discuss with IR re TIPS revision  3.  MRI liver  4.  Chemical dependency treatment  5.  Low Na diet  6.  Continue diuretics at current doses  7.  Continue lactulose and rifaximin  8.  Follow-up in 3 months    Gonzalo Bales MD  Hepatology  [facility] spent 40 minutes on the date of the encounter doing chart review, history and exam, documentation and further activities as noted above.

## 2021-12-07 NOTE — PATIENT INSTRUCTIONS
Chemical dependency recommendations     Recommendations are:  1)  Attend 6 individual psychotherapy sessions with a therapist and then follow their recommendations.  2)  Abstain from all mood-altering chemicals unless prescribed by a licensed provider.   3)  You are strongly encouraged to attend one weekly sober support group meeting, such as 12 step based (AA/NA), [facility], [facility], [facility], [facility], [place] [facility] meetings.     4)  Follow all the recommendations of your treatment/medical providers.     To find a therapist, you can contact your insurance company to see who is in network. You can schedule therapy through [employer] by calling [phone]. Other options can include [facility]: [email or web address]    Plan  1.  Chem dep (see above)  2.  Paracentesis  3.  Liver MRI  4.  I will speak to Dr Tristan about TIPS revision  5.  See me in 3 months    Gonzalo Bales MD  Hepatology

## 2021-12-14 ENCOUNTER — TELEPHONE (OUTPATIENT)
Dept: BEHAVIORAL HEALTH | Facility: CLINIC | Age: 56
End: 2021-12-14
Payer: COMMERCIAL

## 2021-12-16 ENCOUNTER — HOSPITAL ENCOUNTER (OUTPATIENT)
Dept: BEHAVIORAL HEALTH | Facility: CLINIC | Age: 56
Discharge: HOME | End: 2021-12-16
Attending: FAMILY MEDICINE | Admitting: FAMILY MEDICINE
Payer: COMMERCIAL

## 2021-12-16 VITALS — BODY MASS INDEX: 23.62 KG/M2 | HEIGHT: 70 IN | WEIGHT: 165 LBS

## 2021-12-16 PROCEDURE — H0001 ALCOHOL AND/OR DRUG ASSESS: HCPCS | Mod: TEL,95

## 2021-12-16 ASSESSMENT — ANXIETY QUESTIONNAIRES
GAD7 TOTAL SCORE: 2
6. BECOMING EASILY ANNOYED OR IRRITABLE: NOT AT ALL
1. FEELING NERVOUS, ANXIOUS, OR ON EDGE: NOT AT ALL
5. BEING SO RESTLESS THAT IT IS HARD TO SIT STILL: SEVERAL DAYS
4. TROUBLE RELAXING: NOT AT ALL
3. WORRYING TOO MUCH ABOUT DIFFERENT THINGS: SEVERAL DAYS
2. NOT BEING ABLE TO STOP OR CONTROL WORRYING: NOT AT ALL
7. FEELING AFRAID AS IF SOMETHING AWFUL MIGHT HAPPEN: NOT AT ALL

## 2021-12-16 ASSESSMENT — MIFFLIN-ST. JEOR: SCORE: 1574.69

## 2021-12-16 ASSESSMENT — PATIENT HEALTH QUESTIONNAIRE - PHQ9: SUM OF ALL RESPONSES TO PHQ QUESTIONS 1-9: 6

## 2021-12-16 ASSESSMENT — PAIN SCALES - GENERAL: PAINLEVEL: NO PAIN (0)

## 2021-12-16 NOTE — PROGRESS NOTES
Patient denies need for paracentesis and did not know he missed a para appointment on 12/13. Rearranged his upcoming schedule to be able to have paracentesis followed by MRI, scheduled 1/3/22, next available MRI spot. He will keep the 12/27 para spot for now, and cancel as it gets closer if not needed. If cancelled, patient will need to reschedule the covid test, currently scheduled 12/23/22. Will check in with patient again next week.

## 2021-12-16 NOTE — PROGRESS NOTES
Type Of Assessment: Comprehensive Assessment Update    Referral Source:  [facility].  MRN: [id number]    DATE OF SERVICE: 2021  Date of previous JOSSE Assessment: [facility]  Provider Name:  Edna Stanley MA [facility]  Provider Phone Number: [phone]     Patient confirmed identity through two factor verification: Full Legal Name and     PATIENT'S NAME: Ivan Bell  Age: 56 year old  Last 4 SSN: [id number]  Sex: male   Gender Identity: male  Sexual Orientation: Heterosexual  Cultural Background: No, Denies any cultural influences or concerns that need to be considered for treatment  YOB: 1965  Current Address:   [address] [place] [address]  Patient Phone Number:  [phone]   Patient's E-Mail Contact:  [email or web address]  Funding: [facility] [place]  PMI: NA  Emergency Contact: Elicia Ray (Spouse) [phone] (Mobile)  DAANES information was provided to patient and patient does not want a copy.     Telemedicine Visit: The patient's condition can be safely assessed and treated via synchronous audio and visual telemedicine encounter.    Reason for Telemedicine Visit: Patient required immediate assessment / treatment   Originating Site (Patient Location): [id number] [place] [address] Mental Health & Addiction Services  Distant Site (Provider Location): Provider Remote Setting- Home Office  Consent:  The patient/guardian has verbally consented to: the potential risks and benefits of telemedicine (video visit) versus in person care; bill my insurance or make self-payment for services provided; and responsibility for payment of non-covered services.   Mode of Communication:  Video Conference via Telephone    START TIME: 8:00 AM  END TIME: 9:01 AM    As the provider I attest to compliance with applicable laws and regulations  related to telemedicine.   Ivan Bell was seen for a substance use disorder consult on 12/16/2021 by CARA Ogden.    Reason for Substance Use Disorder Consult: Patient is  56-year-old male who presents for follow-up of decompensated alcoholic cirrhosis complicated with ascites and hepatic encephalopathy.  Last ETOH= 02/2019. Pt was referred to complete a JOSSE CA by [facility] Pre-Liver Transplant Team but failed to follow through with 9/8/21 CD Evaluation.    Are you currently having severe withdrawal symptoms that are putting yourself or others in danger? No  Are you currently having severe medical problems that require immediate attention? No  Are you currently having severe emotional or behavioral problems that are putting yourself or others at risk of harm? No    Have you participated in prior substance use disorder evaluations? Yes. When, Where, and What circumstances: 9/8/21 at [facility] for his liver transplant.   Have you ever been to detox, inpatient or outpatient treatment for substance related use? List previous treatment: Yes. When, Where, and What circumstances: Patient has never been in detox, nor has attempted to resolve his alcoholism in the past, except through quitting cold turkey.  Have you ever had a gambling problem or had treatment for compulsive gambling? No  Patient does not appear to be in severe withdrawal, an imminent safety risk to self or others, or requiring immediate medical attention and may proceed with the assessment interview.    Comprehensive Substance Use History   X X = Primary Drug Used Age of First Use    Pattern of Substance Use   (heaviest use in life and a use history within the past year if applicable) (DSM-5: Sx #3) Date /  Quantity of last use if within the past 30 days Withdrawal Potential?   Method of use  (Oral, smoked, snorted, IV, etc)    Alcohol   14 HU: about 12 years ago after his brother passed away. Drinking almost daily and drinking  "mostly beer and sometimes hard alcohol. This pattern lasted for 2 years.     Nonalcoholic Beers:  He reports he doesn't drink them. 2-3 years ago no oral    Marijuana/Hashish   No use        Cocaine/Crack No use        Meth/Amphetamines   No use        Heroin   No use        Other Opiates/Synthetics   No use Hx of being prescribed after surgeries.   Abuse: denies       Inhalants  No use        Benzodiazepines   No use        Hallucinogens   No use        Barbiturates/Sedatives/Hypnotics   No use        Over-the-Counter Drugs   No use        Other   kid Coffee: Quit 10 years ago     Pop: drinking maybe 2 cans a day. 12/16/21 no oral    Nicotine   No use         Withdrawal symptoms: Have you had any of the following withdrawal symptoms?  None    Have you experienced any cravings?  No    Have you had periods of abstinence?  Yes   What was your longest period? Patient reports longest period of abstinence is 2-3 years currently. He is staying sober because \"I got my mind set on it. I don't want to be how I used to be.\" He quit drinking alcohol because of his wife and grand kids. He stated, \"I wanted to live for them\" when he was told he had liver disease. His current beverage of choice is a glass with half water and half pop    Any circumstances that lead to relapse? \"death of love ones, bad marriage\"    What activities have you engaged in when using alcohol/other drugs that could be hazardous to you or others?  The patient reported having a history of driving while under the influence of alcohol or drugs.    A description of any risk-taking behavior, including behavior that puts the client at risk of exposure to blood-borne or sexually transmitted diseases: Patient reports none.    Arrests and legal interventions related to substance use:   Patient reports the following substance related arrests or legal issues: DWI from 30 years ago.  Patient denies being on probation / parole / under the jurisdiction of the " court.    A description of how the patient's use affected his ability to function appropriately in a work setting: Patient reports none.    A description of how the patient's use affected their ability to function appropriately in an educational setting: Patient reports none.    Leisure time activities that are associated with substance use: Patient reports bowling while drinking or doing something.    Do you think your substance use has become a problem for you? He agrees he has a substance abuse problem.    MEDICAL HISTORY  Physical or medical concerns or diagnoses: See below.  Medical History:    Past Medical History        Past Medical History:   Diagnosis Date     Alcoholic cirrhosis (H)       Alcoholic cirrhosis of liver (H)       Alcoholic hepatitis 03/2019     Arthritis       OA     Chronic kidney disease       CRF     Depression       Gout       Gout       Hepatic encephalopathy (H)       alcohol related     History of transfusion       Hypertension       Hypertension       Hypertriglyceridemia       Left calcaneus fracture 1/9/2006 January 16, 2006: Fell 10 feet from ladder onto left foot on frozen ground on 1/9/06 at home.  Immediate pain and unable to walk- seen at [place] and diagnosed with calcaneus fracture     Nephrolithiasis       Portal vein thrombosis       left occlusion, partial main     Thrombocytopenia (H)               Patient Active Problem List   Diagnosis     Hypertriglyceridemia     Gouty arthropathy     Erectile dysfunction     CARDIOVASCULAR SCREENING; LDL GOAL LESS THAN 130     24 hour contact given to patient      Hypertension goal BP (blood pressure) < 140/90     Alcoholic cirrhosis of liver with ascites (H)     Nephrolithiasis     CKD (chronic kidney disease) stage 2, GFR 60-89 ml/min     Seasonal allergic rhinitis, unspecified trigger     Thrombocytopenia (H)     Cervicalgia     Acute low back pain        Do you have any current medical treatment needs not being addressed by  inpatient treatment?  No     Do you need a referral for a medical provider? No    Current medications:   Patient reports current meds as:   Outpatient Medications Marked as Taking for the 12/16/21 encounter (Hospital Encounter) with Flo Urias LADC   Medication Sig     Ascorbic Acid (VITAMIN C PO) Take by mouth daily     eplerenone (INSPRA) 50 MG tablet Take 2 tablets (100 mg) by mouth every morning AND 1 tablet (50 mg) every evening.     furosemide (LASIX) 20 MG tablet Take 1 tablet (20 mg) by mouth every evening     furosemide (LASIX) 40 MG tablet Take 1 tablet (40 mg) by mouth every morning     lactulose (CHRONULAC) 10 GM/15ML solution TAKE 30MLS BY MOUTH THREE TIMES DAILY AS NEEDED FOR CONSTIPATION (TAKE AS NEEDED TO MAINTAIN 3 TO 4 BOWEL MOVEMENTS DAILY)     Menaquinone-7 (VITAMIN K2) 100 MCG CAPS Take 200 mcg by mouth daily      MULTIPLE VITAMINS PO Take 1 tablet by mouth daily     potassium chloride ER (KLOR-CON M) 20 MEQ CR tablet Take 2 tablets (40 mEq) by mouth daily     rifaximin (XIFAXAN) 550 MG TABS tablet Take 1 tablet (550 mg) by mouth 2 times daily     sertraline (ZOLOFT) 50 MG tablet TAKE 1/2 A TABLET BY MOUTH ONCE DAILY     sodium bicarbonate 650 MG tablet Take 1 tablet (650 mg) by mouth 2 times daily     vitamin D3 (CHOLECALCIFEROL) 2000 units (50 mcg) tablet Take 1 tablet by mouth daily         Are you pregnant? NA, Male    Do you have any specific physical needs/accommodations? No    MENTAL HEALTH HISTORY:  Have you ever had  hospitalizations or treatment for mental health illness: Patient has not received mental health services in the past. Pt is currently prescribed zoloft.    Mental health history, including diagnosis and symptoms, and the effect on the client's ability to function: NA    Current mental health treatment including psychotropic medication needed to maintain stability: (Note: The assessment must utilize screening tools approved by the commissioner pursuant to  "section 531.3706 to identify whether the client screens positive for co-occurring disorders): See above.    GAIN-SS Tool:  When was the last time that you had significant problems... 9/8/2021 12/16/2021   with feeling very trapped, lonely, sad, blue, depressed or hopeless about the future? Past month Never   with sleep trouble, such as bad dreams, sleeping restlessly, or falling asleep during the day? Past Month Past Month   with feeling very anxious, nervous, tense, scared, panicked or like something bad was going to happen? Past month Never   with becoming very distressed & upset when something reminded you of the past? 2 to 12 months ago Past month   with thinking about ending your life or committing suicide? Never Never     When was the last time that you did the following things 2 or more times? 9/8/2021 12/16/2021   Lied or conned to get things you wanted or to avoid having to do something? 1+ years ago 1+ years ago   Had a hard time paying attention at school, work or home? 1+ years ago Never   Had a hard time listening to instructions at school, work or home? Never Never   Were a bully or threatened other people? Never 1+ years ago   Started physical fights with other people? 1+ years ago 1+ years ago       Have you ever been verbally, emotionally, physically or sexually abused?   No    Family history of substance use and misuse: Patient reports his brother who passed away had substance use problem.    The patient's desire for family involvement in the treatment program: yes.  Level of family support: Patient reports that his wife and his children are all supportive.    Social network in relation to expected support for recovery: Patient reports none.    Are you currently in a significant relationship? Yes.  4B. How long? \"been  for 24 years\"            Please describe your significant other's use of mood altering chemicals? Patient reports that his wife drinks occasionally, not much according to " patient.    Do you have any children (include living arrangements/custody/contact)?:  Patient reported having three children- ages: 35, 33, 27 and 7 grandchildren.    What is your current living situation? Patient lives with his wife and their 2 dogs and they report that housing is stable    Are you employed/attending school? 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  Patient reports their income is obtained through employment and spouse.  Patient does not identify finances as a current stressor.      SUMMARY:  Ability to understand written treatment materials: Yes  Ability to understand patient rules and patient rights: Yes  Does the patient recognize needs related to substance use and is willing to follow treatment recommendations: Yes  Does the patient have an opioid use disorder:  does not have a history of opiate use.    ASAM Dimension Scale Ratings:  Dimension 1: 0 Client displays full functioning with good ability to tolerate and cope with withdrawal discomfort. No signs or symptoms of intoxication or withdrawal or resolving signs or symptoms.  Dimension 2: 1 Client tolerates and lissa with physical discomfort and is able to get the services that the client needs.  Dimension 3: 1 Client has impulse control and coping skills. Client presents a mild to moderate risk of harm to self or others or displays symptoms of emotional, behavioral or cognitive problems. Client has a mental health diagnosis and is stable. Client functions adequately in significant life areas.  Dimension 4: 0 Client is cooperative, motivated, ready to change, admits problems, committed to change, and engaged in treatment as a responsible participant.  Dimension 5: 1 Client recognizes relapse issues and prevention strategies, but displays some vulnerability for further substance use or mental health problems.  Dimension 6: 1 Client has passive social  or family and significant other are not  interested in the client's recovery. The client is engaged in structured meaningful activity.    Category of Substance Severity (ICD-10 Code / DSM 5 Code)     Alcohol Use Disorder Severe  (10.20) (303.90)   Cannabis Use Disorder The patient does not meet the criteria for a Cannabis use disorder.   Hallucinogen Use Disorder The patient does not meet the criteria for a Hallucinogen use disorder.   Inhalant Use Disorder The patient does not meet the criteria for an Inhalant use disorder.   Opioid Use Disorder The patient does not meet the criteria for an Opioid use disorder.   Sedative, Hypnotic, or Anxiolytic Use Disorder The patient does not meet the criteria for a Sedative/Hypnotic use disorder.   Stimulant Related Disorder The patient does not meet the criteria for a Stimulant use disorder.   Tobacco Use Disorder The patient does not meet the criteria for a Tobacco use disorder.   Other (or unknown) Substance Use Disorder The patient does not meet the criteria for a Other (or unknown) Substance use disorder.     A problematic pattern of alcohol/drug use leading to clinically significant impairment or distress, as manifested by at least two of the following, occurring within a 12-month period:    1.) Alcohol/drug is often taken in larger amounts or over a longer period than was intended.  2.) There is a persistent desire or unsuccessful efforts to cut down or control alcohol/drug use  3.) A great deal of time is spent in activities necessary to obtain alcohol, use alcohol, or recover from its effects.  9.) Alcohol/drug use is continued despite knowledge of having a persistent or recurrent physical or psychological problem that is likely to have been caused or exacerbated by alcohol.  10.) Tolerance, as defined by either of the following: A need for markedly increased amounts of alcohol/drug to achieve intoxication or desired effect.    Specify if: In early remission:  After full criteria for alcohol/drug use  disorder were previously met, none of the criteria for alcohol/drug use disorder have been met for at least 3 months but for less than 12 months (with the exception that Criterion A4,  Craving or a strong desire or urge to use alcohol/drug  may be met).     In sustained remission:   After full criteria for alcohol use disorder were previously met, non of the criteria for alcohol/drug use disorder have been met at any time during a period of 12 months or longer (with the exception that Criterion A4,  Craving or strong desire or urge to use alcohol/drug  may be met).     Specify if:   This additional specifier is used if the individual is in an environment where access to alcohol is restricted.    Mild: Presence of 2-3 symptoms  Moderate: Presence of 4-5 symptoms  Severe: Presence of 6 or more symptoms    Collateral information: JOSSE Collateral Info: Sufficient information is obtained from the patient to support diagnosis and recommendations. Contact with a collateral sources is not required.    Recommendations:   1)  Complete a relapse prevention program or Attend 6 individual psychotherapy sessions with a therapist and then follow recommendations.  2)  Abstain from all mood-altering chemicals unless prescribed by a licensed provider.   3)  You are strongly encouraged to attend one weekly sober support group meeting, such as 12 step based (AA/NA), SMART Recovery, Health Realizations, [facility] Recovery, SHANTI, [place] Recovery Connection meetings.     4)  Follow all the recommendations of your treatment/medical providers.    Clinical Substantiation:    Met with patient individually on 12/16/21 for comprehensive assessment including summary of assessment and conclusion, assessment risk and appropriate steps taken, documentation to support the diagnosis, rationale for disposition and appropriate level of care recommended and alternative for treatment options.    Patient is a 56-year-old heterosexual   male, with  2 adult daughters.     ASSESSMENT SUMMARY:     D1 ADMIT RATING (0) Patient presents with alcohol use disorder, and his date of last use of alcohol was on 2-3 years ago    D2 ADMIT RATING (1) Patient presents with medical health concerns, has a primary care provider and is under doctor care.     D3 ADMIT RATING (1) Patient reports no mental health diagnosis . He denies any history or current suicidal ideation at this time. He reports difficulty with impulse control in the past.     D4 ADMIT RATING (0) Patient presents as in the contemplative stage of change.  He expresses willingness to get professional help to further his long term sobriety.     D5 ADMIT RATING (4) Patient reports no prior treatment episodes and 2-3 years of sobriety.  He has some understanding of relapse prevention and some coping skills to arrest his use. .    DIM. 6 ADMIT RATING (3) Patient lives with his wife and 2 dogs, has two adult daughters. Pt has his own business, is engaged in some structured activities. He lacks sober support and has no legal issues.    Rational for recommended level of care: Patient needs additional coping skills before his liver transplant.      Referrals/ Alternatives: [employer] [address] [place] , [place] [address]  Main Tel: [phone]  Fax: [phone]     Daanes: Record has been saved! Assessment ID: [id number]    JOSSE consult completed by: CARA Ogden.  Phone Number: [phone]  E-mail Address: [email or web address] and Addiction [email or web address] Evaluation Department  [address] [place] [address]     *Due to regulation of Title 42 of the Code of Federal Regulations (CFR) Part 2: Confidentiality laws apply to this note and the information wherein.  Thus, this note cannot be copy and pasted into any other health care staff's note nor can it be included in general medical records sent to ANY outside agency without  the patient's written consent.

## 2021-12-17 ASSESSMENT — ANXIETY QUESTIONNAIRES: GAD7 TOTAL SCORE: 2

## 2021-12-22 ENCOUNTER — TELEPHONE (OUTPATIENT)
Dept: VASCULAR SURGERY | Facility: CLINIC | Age: 56
End: 2021-12-22
Payer: COMMERCIAL

## 2021-12-22 ENCOUNTER — PATIENT OUTREACH (OUTPATIENT)
Dept: GASTROENTEROLOGY | Facility: CLINIC | Age: 56
End: 2021-12-22
Payer: COMMERCIAL

## 2021-12-22 NOTE — TELEPHONE ENCOUNTER
Called pt and william MIRANDA    Informed him that I received a msg from the liver team that pt has appts on [place] 1/3     He also has an in person appt with Dr Tristan    Informed him that we will cancel his appt with Dr Tristan and move it to Mon 1/20 at 4pm     Video visit    Informed him that we will go over results and check in to see how he's doing    Left direct line for him to call back with any questions.     Denise LAZARO RN, BSN  Interventional Radiology/Vascular  Nurse Coordinator   Phone: [phone]  Fax: [phone]

## 2021-12-22 NOTE — PROGRESS NOTES
Attempted to reach patient to determine need for upcoming paracentesis appointment on 12/27. Call back requested.

## 2021-12-23 ENCOUNTER — LAB (OUTPATIENT)
Dept: LAB | Facility: CLINIC | Age: 56
End: 2021-12-23
Attending: INTERNAL MEDICINE
Payer: COMMERCIAL

## 2021-12-23 DIAGNOSIS — K70.31 ALCOHOLIC CIRRHOSIS OF LIVER WITH ASCITES (H): ICD-10-CM

## 2021-12-23 PROCEDURE — U0003 INFECTIOUS AGENT DETECTION BY NUCLEIC ACID (DNA OR RNA); SEVERE ACUTE RESPIRATORY SYNDROME CORONAVIRUS 2 (SARS-COV-2) (CORONAVIRUS DISEASE [COVID-19]), AMPLIFIED PROBE TECHNIQUE, MAKING USE OF HIGH THROUGHPUT TECHNOLOGIES AS DESCRIBED BY CMS-2020-01-R: HCPCS

## 2021-12-23 PROCEDURE — U0005 INFEC AGEN DETEC AMPLI PROBE: HCPCS

## 2021-12-24 LAB — SARS-COV-2 RNA RESP QL NAA+PROBE: NEGATIVE

## 2021-12-27 ENCOUNTER — ANCILLARY PROCEDURE (OUTPATIENT)
Dept: ULTRASOUND IMAGING | Facility: CLINIC | Age: 56
End: 2021-12-27
Attending: INTERNAL MEDICINE
Payer: COMMERCIAL

## 2021-12-27 ENCOUNTER — OFFICE VISIT (OUTPATIENT)
Dept: INFUSION THERAPY | Facility: CLINIC | Age: 56
End: 2021-12-27
Attending: INTERNAL MEDICINE
Payer: COMMERCIAL

## 2021-12-27 VITALS
WEIGHT: 159.8 LBS | DIASTOLIC BLOOD PRESSURE: 77 MMHG | BODY MASS INDEX: 22.93 KG/M2 | TEMPERATURE: 98.3 F | OXYGEN SATURATION: 99 % | SYSTOLIC BLOOD PRESSURE: 142 MMHG | HEART RATE: 81 BPM

## 2021-12-27 DIAGNOSIS — K70.31 ALCOHOLIC CIRRHOSIS OF LIVER WITH ASCITES (H): Primary | ICD-10-CM

## 2021-12-27 PROCEDURE — 272N000706 US PARACENTESIS

## 2021-12-27 PROCEDURE — 250N000009 HC RX 250: Performed by: INTERNAL MEDICINE

## 2021-12-27 PROCEDURE — 49083 ABD PARACENTESIS W/IMAGING: CPT | Mod: GC | Performed by: RADIOLOGY

## 2021-12-27 RX ORDER — ALBUTEROL SULFATE 0.83 MG/ML
2.5 SOLUTION RESPIRATORY (INHALATION)
Status: CANCELLED | OUTPATIENT
Start: 2021-12-27

## 2021-12-27 RX ORDER — LIDOCAINE HYDROCHLORIDE 10 MG/ML
20 INJECTION, SOLUTION EPIDURAL; INFILTRATION; INTRACAUDAL; PERINEURAL ONCE
Status: CANCELLED | OUTPATIENT
Start: 2021-12-27 | End: 2021-12-27

## 2021-12-27 RX ORDER — ALBUMIN (HUMAN) 12.5 G/50ML
12.5 SOLUTION INTRAVENOUS
Status: DISCONTINUED | OUTPATIENT
Start: 2021-12-27 | End: 2021-12-27 | Stop reason: HOSPADM

## 2021-12-27 RX ORDER — DIPHENHYDRAMINE HYDROCHLORIDE 50 MG/ML
50 INJECTION INTRAMUSCULAR; INTRAVENOUS
Status: CANCELLED
Start: 2021-12-27

## 2021-12-27 RX ORDER — HEPARIN SODIUM (PORCINE) LOCK FLUSH IV SOLN 100 UNIT/ML 100 UNIT/ML
5 SOLUTION INTRAVENOUS
Status: CANCELLED | OUTPATIENT
Start: 2021-12-27

## 2021-12-27 RX ORDER — ALBUTEROL SULFATE 90 UG/1
1-2 AEROSOL, METERED RESPIRATORY (INHALATION)
Status: CANCELLED
Start: 2021-12-27

## 2021-12-27 RX ORDER — ALBUMIN (HUMAN) 12.5 G/50ML
12.5 SOLUTION INTRAVENOUS
Status: CANCELLED | OUTPATIENT
Start: 2021-12-27

## 2021-12-27 RX ORDER — METHYLPREDNISOLONE SODIUM SUCCINATE 125 MG/2ML
125 INJECTION, POWDER, LYOPHILIZED, FOR SOLUTION INTRAMUSCULAR; INTRAVENOUS
Status: CANCELLED
Start: 2021-12-27

## 2021-12-27 RX ORDER — EPINEPHRINE 1 MG/ML
0.3 INJECTION, SOLUTION INTRAMUSCULAR; SUBCUTANEOUS EVERY 5 MIN PRN
Status: CANCELLED | OUTPATIENT
Start: 2021-12-27

## 2021-12-27 RX ORDER — MEPERIDINE HYDROCHLORIDE 25 MG/ML
25 INJECTION INTRAMUSCULAR; INTRAVENOUS; SUBCUTANEOUS EVERY 30 MIN PRN
Status: CANCELLED | OUTPATIENT
Start: 2021-12-27

## 2021-12-27 RX ORDER — HEPARIN SODIUM,PORCINE 10 UNIT/ML
5 VIAL (ML) INTRAVENOUS
Status: CANCELLED | OUTPATIENT
Start: 2021-12-27

## 2021-12-27 RX ORDER — LIDOCAINE HYDROCHLORIDE 10 MG/ML
20 INJECTION, SOLUTION EPIDURAL; INFILTRATION; INTRACAUDAL; PERINEURAL ONCE
Status: COMPLETED | OUTPATIENT
Start: 2021-12-27 | End: 2021-12-27

## 2021-12-27 RX ORDER — NALOXONE HYDROCHLORIDE 0.4 MG/ML
0.2 INJECTION, SOLUTION INTRAMUSCULAR; INTRAVENOUS; SUBCUTANEOUS
Status: CANCELLED | OUTPATIENT
Start: 2021-12-27

## 2021-12-27 RX ADMIN — LIDOCAINE HYDROCHLORIDE 15 ML: 10 INJECTION, SOLUTION EPIDURAL; INFILTRATION; INTRACAUDAL; PERINEURAL at 14:32

## 2021-12-27 ASSESSMENT — PAIN SCALES - GENERAL: PAINLEVEL: MODERATE PAIN (5)

## 2021-12-27 NOTE — PROGRESS NOTES
Paracentesis Nursing Note  Ivan Bell presents [facility].    During today's appointment orders from Dr. Nii Miramontes were completed.    Progress Note:  Patient identification verified by name and date of birth.  Assessment completed.  Vitals monitored throughout appointment and recorded in Doc Flowsheets.  See proceduralist note in ultrasound.    Vascular Access: peripheral IV placed today.  Labs: were not ordered for this appointment.    Date of consent or authorization: 12/27/21.  Invasive Procedure Safety Checklist was completed and sent for scanning.     Paracentesis performed by Dr. Wiley.    The following labs were communicated to provider performing paracentesis:  Lab Results   Component Value Date    PLT 60 12/07/2021    PLT 58 06/23/2021       Total amount of ascites fluid drained: 3.7 liters.  Color of ascites fluid: yellow.  Total amount of albumin given: 0 grams.    Patient tolerated procedure well.    Post procedure,denies pain or discomfort post paracentesis.    Discharge Plan:  Discharge instructions were reviewed with patient.  Patient/Representative verbalized understanding and all questions were answered.   Discharged from [facility] in stable condition.    Sepideh Le, DILIA       Administrations This Visit     lidocaine (PF) (XYLOCAINE) 1 % injection 20 mL     Admin Date  12/27/2021 Action  Given by Other Dose  15 mL Route  Subcutaneous Administered By  Sepideh Le, RN                BP (!) 159/73 (Patient Position: Standing)   Pulse 83   Temp 98.3  F (36.8  C) (Oral)   Wt 76.6 kg (168 lb 12.8 oz)   SpO2 99%   BMI 24.22 kg/m

## 2021-12-27 NOTE — LETTER
12/27/2021         RE: Ivan Bell  [address] [facility] [id number]        Dear Colleague,    Thank you for referring your patient, Ivan Bell, to the [facility]. Please see a copy of my visit note below.    Paracentesis Nursing Note  Ivan Bell presents [facility].    During today's appointment orders from Dr. Nii Miramontes were completed.    Progress Note:  Patient identification verified by name and date of birth.  Assessment completed.  Vitals monitored throughout appointment and recorded in Doc Flowsheets.  See proceduralist note in ultrasound.    Vascular Access: peripheral IV placed today.  Labs: were not ordered for this appointment.    Date of consent or authorization: 12/27/21.  Invasive Procedure Safety Checklist was completed and sent for scanning.     Paracentesis performed by Dr. Wiley.    The following labs were communicated to provider performing paracentesis:  Lab Results   Component Value Date    PLT 60 12/07/2021    PLT 58 06/23/2021       Total amount of ascites fluid drained: 3.7 liters.  Color of ascites fluid: yellow.  Total amount of albumin given: 0 grams.    Patient tolerated procedure well.    Post procedure,denies pain or discomfort post paracentesis.    Discharge Plan:  Discharge instructions were reviewed with patient.  Patient/Representative verbalized understanding and all questions were answered.   Discharged from [facility] in stable condition.    Sepideh Le RN       Administrations This Visit     lidocaine (PF) (XYLOCAINE) 1 % injection 20 mL     Admin Date  12/27/2021 Action  Given by Other Dose  15 mL Route  Subcutaneous Administered By  Sepideh Le, RN                BP (!) 159/73 (Patient Position: Standing)   Pulse 83   Temp 98.3  F (36.8  C) (Oral)   Wt 76.6 kg (168 lb 12.8 oz)   SpO2 99%   BMI 24.22 kg/m         [facility] Provider

## 2022-01-02 ENCOUNTER — HEALTH MAINTENANCE LETTER (OUTPATIENT)
Age: 57
End: 2022-01-02

## 2022-01-03 ENCOUNTER — ANCILLARY PROCEDURE (OUTPATIENT)
Dept: MRI IMAGING | Facility: CLINIC | Age: 57
End: 2022-01-03
Attending: INTERNAL MEDICINE
Payer: COMMERCIAL

## 2022-01-03 ENCOUNTER — OFFICE VISIT (OUTPATIENT)
Dept: INFUSION THERAPY | Facility: CLINIC | Age: 57
End: 2022-01-03
Attending: INTERNAL MEDICINE
Payer: COMMERCIAL

## 2022-01-03 ENCOUNTER — ANCILLARY PROCEDURE (OUTPATIENT)
Dept: ULTRASOUND IMAGING | Facility: CLINIC | Age: 57
End: 2022-01-03
Attending: INTERNAL MEDICINE
Payer: COMMERCIAL

## 2022-01-03 VITALS — TEMPERATURE: 97.9 F | OXYGEN SATURATION: 98 % | BODY MASS INDEX: 22.57 KG/M2 | WEIGHT: 157.3 LBS

## 2022-01-03 DIAGNOSIS — K70.31 ALCOHOLIC CIRRHOSIS OF LIVER WITH ASCITES (H): Primary | ICD-10-CM

## 2022-01-03 DIAGNOSIS — K70.31 ALCOHOLIC CIRRHOSIS OF LIVER WITH ASCITES (H): ICD-10-CM

## 2022-01-03 PROCEDURE — A9585 GADOBUTROL INJECTION: HCPCS | Performed by: RADIOLOGY

## 2022-01-03 PROCEDURE — 74183 MRI ABD W/O CNTR FLWD CNTR: CPT | Performed by: RADIOLOGY

## 2022-01-03 PROCEDURE — 250N000009 HC RX 250: Performed by: INTERNAL MEDICINE

## 2022-01-03 PROCEDURE — 49083 ABD PARACENTESIS W/IMAGING: CPT | Performed by: RADIOLOGY

## 2022-01-03 PROCEDURE — 272N000706 US PARACENTESIS

## 2022-01-03 RX ORDER — ALBUTEROL SULFATE 90 UG/1
1-2 AEROSOL, METERED RESPIRATORY (INHALATION)
Status: CANCELLED
Start: 2022-01-03

## 2022-01-03 RX ORDER — LIDOCAINE HYDROCHLORIDE 10 MG/ML
20 INJECTION, SOLUTION EPIDURAL; INFILTRATION; INTRACAUDAL; PERINEURAL ONCE
Status: CANCELLED | OUTPATIENT
Start: 2022-01-03 | End: 2022-01-03

## 2022-01-03 RX ORDER — DIPHENHYDRAMINE HYDROCHLORIDE 50 MG/ML
50 INJECTION INTRAMUSCULAR; INTRAVENOUS
Status: CANCELLED
Start: 2022-01-03

## 2022-01-03 RX ORDER — LIDOCAINE HYDROCHLORIDE 10 MG/ML
20 INJECTION, SOLUTION EPIDURAL; INFILTRATION; INTRACAUDAL; PERINEURAL ONCE
Status: COMPLETED | OUTPATIENT
Start: 2022-01-03 | End: 2022-01-03

## 2022-01-03 RX ORDER — ALBUTEROL SULFATE 0.83 MG/ML
2.5 SOLUTION RESPIRATORY (INHALATION)
Status: CANCELLED | OUTPATIENT
Start: 2022-01-03

## 2022-01-03 RX ORDER — METHYLPREDNISOLONE SODIUM SUCCINATE 125 MG/2ML
125 INJECTION, POWDER, LYOPHILIZED, FOR SOLUTION INTRAMUSCULAR; INTRAVENOUS
Status: CANCELLED
Start: 2022-01-03

## 2022-01-03 RX ORDER — EPINEPHRINE 1 MG/ML
0.3 INJECTION, SOLUTION INTRAMUSCULAR; SUBCUTANEOUS EVERY 5 MIN PRN
Status: CANCELLED | OUTPATIENT
Start: 2022-01-03

## 2022-01-03 RX ORDER — ALBUMIN (HUMAN) 12.5 G/50ML
12.5 SOLUTION INTRAVENOUS
Status: CANCELLED | OUTPATIENT
Start: 2022-01-03

## 2022-01-03 RX ORDER — HEPARIN SODIUM (PORCINE) LOCK FLUSH IV SOLN 100 UNIT/ML 100 UNIT/ML
5 SOLUTION INTRAVENOUS
Status: CANCELLED | OUTPATIENT
Start: 2022-01-03

## 2022-01-03 RX ORDER — MEPERIDINE HYDROCHLORIDE 25 MG/ML
25 INJECTION INTRAMUSCULAR; INTRAVENOUS; SUBCUTANEOUS EVERY 30 MIN PRN
Status: CANCELLED | OUTPATIENT
Start: 2022-01-03

## 2022-01-03 RX ORDER — HEPARIN SODIUM,PORCINE 10 UNIT/ML
5 VIAL (ML) INTRAVENOUS
Status: CANCELLED | OUTPATIENT
Start: 2022-01-03

## 2022-01-03 RX ORDER — GADOBUTROL 604.72 MG/ML
7.5 INJECTION INTRAVENOUS ONCE
Status: COMPLETED | OUTPATIENT
Start: 2022-01-03 | End: 2022-01-03

## 2022-01-03 RX ORDER — NALOXONE HYDROCHLORIDE 0.4 MG/ML
0.2 INJECTION, SOLUTION INTRAMUSCULAR; INTRAVENOUS; SUBCUTANEOUS
Status: CANCELLED | OUTPATIENT
Start: 2022-01-03

## 2022-01-03 RX ADMIN — GADOBUTROL 7 ML: 604.72 INJECTION INTRAVENOUS at 15:57

## 2022-01-03 RX ADMIN — LIDOCAINE HYDROCHLORIDE 10 ML: 10 INJECTION, SOLUTION EPIDURAL; INFILTRATION; INTRACAUDAL; PERINEURAL at 14:32

## 2022-01-03 ASSESSMENT — PAIN SCALES - GENERAL: PAINLEVEL: SEVERE PAIN (6)

## 2022-01-03 NOTE — LETTER
1/3/2022         RE: Ivan Bell  [address] [facility] [address]        Dear Colleague,    Thank you for referring your patient, Ivan Bell, to the [facility]. Please see a copy of my visit note below.    Paracentesis Nursing Note  Ivan Bell presents [facility].    During today's appointment orders from  were completed.    Progress Note:  Patient identification verified by name and date of birth.  Assessment completed.  Vitals monitored throughout appointment and recorded in Doc Flowsheets.  See proceduralist note in ultrasound.    Vascular Access: None  Labs: were not ordered for this appointment.    Date of consent or authorization: 12/27/21.  Invasive Procedure Safety Checklist was completed and sent for scanning.     Paracentesis performed by .    The following labs were communicated to provider performing paracentesis:  Lab Results   Component Value Date    PLT 60 12/07/2021    PLT 58 06/23/2021       Total amount of ascites fluid drained: 3.1 liters.  Color of ascites fluid: straw colored.  Total amount of albumin given: patient did not meet order parameters for albumin    Patient tolerated procedure well.    Post procedure,denies pain or discomfort post paracentesis.    Asymptomatic COVID test date: 12/23/21. Patient declines scheduling additional paracentesis appointment at this time. Patient states he has appointment with  and would like to discuss before proceeding with additional procedures. Patient is aware he will need COVID test within 2 weeks of paracentesis.       Discharge Plan:  Discharge instructions were reviewed with patient.  Patient/Representative verbalized understanding and all questions were answered.   Discharged from [facility] in stable condition.    Rianna Chicas RN       Administrations This  Visit     lidocaine (PF) (XYLOCAINE) 1 % injection 20 mL     Admin Date  01/03/2022 Action  Given Dose  10 mL Route  Subcutaneous Administered By  Rianna Chicas, RN                  Temp 97.9  F (36.6  C) (Oral)   Wt 74.5 kg (164 lb 4.8 oz)   SpO2 98%   BMI 23.57 kg/m      [facility] Paracentesis Provider

## 2022-01-03 NOTE — DISCHARGE INSTRUCTIONS
MRI Contrast Discharge Instructions    The IV contrast you received today will pass out of your body in your  urine. This will happen in the next 24 hours. You will not feel this process.  Your urine will not change color.    Drink at least 4 extra glasses of water or juice today (unless your doctor  has restricted your fluids). This reduces the stress on your kidneys.  You may take your regular medicines.    If you are on dialysis: It is best to have dialysis today.    If you have a reaction: Most reactions happen right away. If you have  any new symptoms after leaving the hospital (such as hives or swelling),  call your hospital at the correct number below. Or call your family doctor.  If you have breathing distress or wheezing, call 911.    Special instructions: ***    I have read and understand the above information.    Signature:______________________________________ Date:___________    Staff:__________________________________________ Date:___________     Time:__________    [facility] Departments:    ___[place]: [phone]  ___[facility]: [phone]  ___[place]: [phone] ___[facility]: [phone]  ___[facility]: [phone]  ___[facility]: [phone]  ___[phone]  ___[facility]: [phone]  ___[phone]

## 2022-01-03 NOTE — PATIENT INSTRUCTIONS
Dear Ivan Bell    Thank you for choosing [facility] ([facility]) for your procedure.  The following information is a summary of our appointment as well as important reminders.      Patient Education     Discharge Instructions for Paracentesis  Paracentesis is a procedure to remove extra fluid from your belly (abdomen). This fluid buildup in the abdomen is called ascites. The procedure may have been done to take a sample of the fluid. Or, it may have been done to drain the extra fluid from your abdomen and help make you more comfortable.      Ascites is buildup of excess fluid in the abdomen.   Home care    If you have pain after the procedure, your healthcare provider can prescribe or recommend pain medicines. Take these exactly as directed. If you stopped taking other medicines before the procedure, ask your provider when you can start them again.    Take it easy for 24 hours after the procedure. Don't do any physical activity until your provider says it s OK.    You will have a small bandage over the puncture site. Stitches, surgical staples, adhesive tapes, adhesive strips, or surgical glue may be used to close the incision. They also help stop bleeding and speed healing. You may take the bandage off in 24 hours.    Check the puncture site for the signs of infection listed below.    Follow-up care  Make a follow-up appointment with your healthcare provider as directed. During your follow-up visit, your provider will check your healing. Let your provider know how you are feeling. You can also discuss the cause of your ascites and if you need any further treatment. If your fluid is infected, you will be sent home on antibiotics. In some cases, the paracentesis may need to be repeated if the fluid returns. Your provider may also prescribe medicines that increase urination (diuretics) to decrease the buildup of fluid.   When to call your healthcare  provider  Call your healthcare provider if you have any of the following after the procedure:    A fever of 100.4  F ( 38.0 C) or higher, or as directed by your provider    Chills    Trouble breathing    Pain that doesn't go away even after taking pain medicine    Belly pain not caused by having the skin punctured    Bleeding from the puncture site    More than a small amount of fluid leaking from the puncture site    Swollen belly    Signs of infection at the puncture site. These include increased pain, redness, or swelling, warmth, or bad-smelling drainage.    Blood in your urine    Feeling dizzy or lightheaded, or fainting  [employer] last reviewed this educational content on 10/1/2019    8330-0581 [employer]. All rights reserved. This information is not intended as a substitute for professional medical care. Always follow your healthcare professional's instructions.             We look forward in seeing you on your next appointment here at [facility] ([facility]).  Please don t hesitate to call us at [phone] to reschedule any of your appointments or to speak with one of the [facility] registered nurses.  It was a pleasure taking care of you today.    Sincerely,    [facility]  [facility]  [address] [place] [address]  [address]  Phone:  [phone]

## 2022-01-03 NOTE — PROGRESS NOTES
Paracentesis Nursing Note  Ivan Bell presents today to [facility] for a [facility].    During today's appointment orders from  were completed.    Progress Note:  Patient identification verified by name and date of birth.  Assessment completed.  Vitals monitored throughout appointment and recorded in Doc Flowsheets.  See proceduralist note in ultrasound.    Vascular Access: None  Labs: were not ordered for this appointment.    Date of consent or authorization: 12/27/21.  Invasive Procedure Safety Checklist was completed and sent for scanning.     Paracentesis performed by .    The following labs were communicated to provider performing paracentesis:  Lab Results   Component Value Date    PLT 60 12/07/2021    PLT 58 06/23/2021       Total amount of ascites fluid drained: 3.1 liters.  Color of ascites fluid: straw colored.  Total amount of albumin given: patient did not meet order parameters for albumin    Patient tolerated procedure well.    Post procedure,denies pain or discomfort post paracentesis.    Asymptomatic COVID test date: 12/23/21. Patient declines scheduling additional paracentesis appointment at this time. Patient states he has appointment with  and would like to discuss before proceeding with additional procedures. Patient is aware he will need COVID test within 2 weeks of paracentesis.       Discharge Plan:  Discharge instructions were reviewed with patient.  Patient/Representative verbalized understanding and all questions were answered.   Discharged from [facility] in stable condition.    Rianna Chicas RN       Administrations This Visit     lidocaine (PF) (XYLOCAINE) 1 % injection 20 mL     Admin Date  01/03/2022 Action  Given Dose  10 mL Route  Subcutaneous Administered By  Rianna Chicas RN                  Temp 97.9  F (36.6  C) (Oral)   Wt 74.5 kg (164 lb 4.8 oz)   SpO2 98%   BMI 23.57 kg/m

## 2022-01-04 ENCOUNTER — MYC MEDICAL ADVICE (OUTPATIENT)
Dept: GASTROENTEROLOGY | Facility: CLINIC | Age: 57
End: 2022-01-04
Payer: COMMERCIAL

## 2022-01-04 ENCOUNTER — TELEPHONE (OUTPATIENT)
Dept: GASTROENTEROLOGY | Facility: CLINIC | Age: 57
End: 2022-01-04
Payer: COMMERCIAL

## 2022-01-04 DIAGNOSIS — E87.6 HYPOKALEMIA: ICD-10-CM

## 2022-01-04 RX ORDER — POTASSIUM CHLORIDE 1500 MG/1
40 TABLET, EXTENDED RELEASE ORAL DAILY
Qty: 120 TABLET | Refills: 1 | Status: SHIPPED | OUTPATIENT
Start: 2022-01-04 | End: 2022-05-12

## 2022-01-04 NOTE — TELEPHONE ENCOUNTER
Prior Authorization Retail Medication Request    Medication/Dose: Potassium  ICD code (if different than what is on RX):    Previously Tried and Failed:    Rationale:  Patient Age exceeds Max    Insurance Name:  [facility]  Insurance ID:  [id number]      Pharmacy Information (if different than what is on RX)  Name:  [facility]  Phone:  [phone]      Please start the Prior Auth for this medication      If any questions please contact the site directly.    Thank you,  Abby Ang [facility]  [facility] Technician

## 2022-01-05 NOTE — TELEPHONE ENCOUNTER
Called pt to review MRI results. Per Dr. Bales, there is a low suspicion for malignancy and pt will need a repeat MRI in 6 months for monitoring. Pt verbalized understanding.

## 2022-01-10 ENCOUNTER — VIRTUAL VISIT (OUTPATIENT)
Dept: RADIOLOGY | Facility: CLINIC | Age: 57
End: 2022-01-10
Attending: RADIOLOGY
Payer: COMMERCIAL

## 2022-01-10 ENCOUNTER — TELEPHONE (OUTPATIENT)
Dept: GASTROENTEROLOGY | Facility: CLINIC | Age: 57
End: 2022-01-10

## 2022-01-10 DIAGNOSIS — K70.31 ALCOHOLIC CIRRHOSIS OF LIVER WITH ASCITES (H): Primary | ICD-10-CM

## 2022-01-10 PROCEDURE — 999N001193 HC VIDEO/TELEPHONE VISIT; NO CHARGE

## 2022-01-10 PROCEDURE — 99214 OFFICE O/P EST MOD 30 MIN: CPT | Mod: TEL | Performed by: RADIOLOGY

## 2022-01-10 NOTE — TELEPHONE ENCOUNTER
Central Prior Authorization Team   Phone: [phone]      PA Initiation    Medication: potassium chloride ER (KLOR-CON M) 20 MEQ CR tablet  Insurance Company: [employer] ([facility]) - Phone [phone] Fax [phone]  Pharmacy Filling the Rx: [facility] - [id number] [facility]  Filling Pharmacy Phone: [phone]  Filling Pharmacy Fax:    Start Date: 1/10/2022

## 2022-01-10 NOTE — TELEPHONE ENCOUNTER
Prior Authorization Retail Medication Request    Medication/Dose: Potassium oral  ICD code (if different than what is on RX):  K70.31, E87.6   Previously Tried and Failed:  Diet modification alone   Rationale:  Patient is on long term diuretics to manage ascites from cirrhosis, which in turn cause low potassium in this pt.     Insurance Name:    Insurance ID:        Pharmacy Information (if different than what is on RX)  Name:    Phone:

## 2022-01-10 NOTE — LETTER
1/10/2022         RE: Ivan Bell  [address] [address] [place] [address]        Dear Colleague,    Thank you for referring your patient, Ivan Bell, to the [facility]. Please see a copy of my visit note below.    S:  Mr. 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  Unfortunately, he had a EtOH relapse in 2/2019, but has continued to be sober since. He underwent a TIPS revision on 10/29/2019 and 8/11/2021 for recurrent ascites.  He had been without the need for paracentesis for several months but has required paracentesis the last two weeks which removed 4 and 1L respectively.      He has no complaints otherwise and denies HE, jaundice, or GI bleeds.    O:    Lab Results   Component Value Date    WBC 3.9 12/07/2021    WBC 4.3 06/23/2021     Lab Results   Component Value Date    RBC 3.50 12/07/2021    RBC 3.78 06/23/2021     Lab Results   Component Value Date    HGB 11.4 12/07/2021    HGB 12.1 06/23/2021     Lab Results   Component Value Date    HCT 33.9 12/07/2021    HCT 37.3 06/23/2021     Lab Results   Component Value Date    MCV 97 12/07/2021    MCV 99 06/23/2021     Lab Results   Component Value Date    MCH 32.6 12/07/2021    MCH 32.0 06/23/2021     Lab Results   Component Value Date    MCHC 33.6 12/07/2021    MCHC 32.4 06/23/2021     Lab Results   Component Value Date    RDW 16.3 12/07/2021    RDW 18.6 06/23/2021     Lab Results   Component Value Date    PLT 60 12/07/2021    PLT 58 06/23/2021     Last Comprehensive Metabolic Panel:  Sodium   Date Value Ref Range Status   12/07/2021 146 (H) 133 - 144 mmol/L Final   06/23/2021 148 (H) 133 - 144 mmol/L Final     Potassium   Date Value Ref Range Status   12/07/2021 3.8 3.4 - 5.3 mmol/L Final   06/23/2021 4.0 3.4 - 5.3 mmol/L Final     Chloride   Date Value Ref Range Status   12/07/2021 115 (H) 94 - 109 mmol/L Final   06/23/2021 117 (H) 94 -  109 mmol/L Final     Carbon Dioxide   Date Value Ref Range Status   06/23/2021 25 20 - 32 mmol/L Final     Carbon Dioxide (CO2)   Date Value Ref Range Status   12/07/2021 24 20 - 32 mmol/L Final     Anion Gap   Date Value Ref Range Status   12/07/2021 7 3 - 14 mmol/L Final   06/23/2021 6 3 - 14 mmol/L Final     Glucose   Date Value Ref Range Status   12/07/2021 124 (H) 70 - 99 mg/dL Final   06/23/2021 77 70 - 99 mg/dL Final     Urea Nitrogen   Date Value Ref Range Status   12/07/2021 14 7 - 30 mg/dL Final   06/23/2021 8 7 - 30 mg/dL Final     Creatinine   Date Value Ref Range Status   12/07/2021 1.31 (H) 0.66 - 1.25 mg/dL Final   06/23/2021 1.09 0.66 - 1.25 mg/dL Final     GFR Estimate   Date Value Ref Range Status   12/07/2021 60 (L) >60 mL/min/1.73m2 Final     Comment:     As of July 11, 2021, eGFR is calculated by the CKD-EPI creatinine equation, without race adjustment. eGFR can be influenced by muscle mass, exercise, and diet. The reported eGFR is an estimation only and is only applicable if the renal function is stable.   06/23/2021 75 >60 mL/min/[1.73_m2] Final     Comment:     Non  GFR Calc  Starting 12/18/2018, serum creatinine based estimated GFR (eGFR) will be   calculated using the Chronic Kidney Disease Epidemiology Collaboration   (CKD-EPI) equation.       Calcium   Date Value Ref Range Status   12/07/2021 8.2 (L) 8.5 - 10.1 mg/dL Final   06/23/2021 8.5 8.5 - 10.1 mg/dL Final     Bilirubin Total   Date Value Ref Range Status   12/07/2021 2.3 (H) 0.2 - 1.3 mg/dL Final   06/23/2021 3.6 (H) 0.2 - 1.3 mg/dL Final     Alkaline Phosphatase   Date Value Ref Range Status   12/07/2021 142 40 - 150 U/L Final   06/23/2021 165 (H) 40 - 150 U/L Final     ALT   Date Value Ref Range Status   12/07/2021 23 0 - 70 U/L Final   06/23/2021 19 0 - 70 U/L Final     AST   Date Value Ref Range Status   12/07/2021 37 0 - 45 U/L Final   06/23/2021 33 0 - 45 U/L Final     MRI: I reviewed his MRI from 1/3/22 which  did not show any concerning lesion.    A/P: Mr. Bell is overall doing well but has had some recurrence of his ascites.  I believe he needs to have a TIPS revision and we will schedule him for that as soon as possible.    A total of 15 minutes was spent in care for the patient, of which >50% was spent in counseling and co-ordination of care.      Jelani Tristan MD

## 2022-01-10 NOTE — PROGRESS NOTES
S:  Mr. 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  Unfortunately, he had a EtOH relapse in 2/2019, but has continued to be sober since. He underwent a TIPS revision on 10/29/2019 and 8/11/2021 for recurrent ascites.  He had been without the need for paracentesis for several months but has required paracentesis the last two weeks which removed 4 and 1L respectively.      He has no complaints otherwise and denies HE, jaundice, or GI bleeds.    O:    Lab Results   Component Value Date    WBC 3.9 12/07/2021    WBC 4.3 06/23/2021     Lab Results   Component Value Date    RBC 3.50 12/07/2021    RBC 3.78 06/23/2021     Lab Results   Component Value Date    HGB 11.4 12/07/2021    HGB 12.1 06/23/2021     Lab Results   Component Value Date    HCT 33.9 12/07/2021    HCT 37.3 06/23/2021     Lab Results   Component Value Date    MCV 97 12/07/2021    MCV 99 06/23/2021     Lab Results   Component Value Date    MCH 32.6 12/07/2021    MCH 32.0 06/23/2021     Lab Results   Component Value Date    MCHC 33.6 12/07/2021    MCHC 32.4 06/23/2021     Lab Results   Component Value Date    RDW 16.3 12/07/2021    RDW 18.6 06/23/2021     Lab Results   Component Value Date    PLT 60 12/07/2021    PLT 58 06/23/2021     Last Comprehensive Metabolic Panel:  Sodium   Date Value Ref Range Status   12/07/2021 146 (H) 133 - 144 mmol/L Final   06/23/2021 148 (H) 133 - 144 mmol/L Final     Potassium   Date Value Ref Range Status   12/07/2021 3.8 3.4 - 5.3 mmol/L Final   06/23/2021 4.0 3.4 - 5.3 mmol/L Final     Chloride   Date Value Ref Range Status   12/07/2021 115 (H) 94 - 109 mmol/L Final   06/23/2021 117 (H) 94 - 109 mmol/L Final     Carbon Dioxide   Date Value Ref Range Status   06/23/2021 25 20 - 32 mmol/L Final     Carbon Dioxide (CO2)   Date Value Ref Range Status   12/07/2021 24 20 - 32 mmol/L Final     Anion Gap   Date Value Ref Range Status   12/07/2021 7 3  - 14 mmol/L Final   06/23/2021 6 3 - 14 mmol/L Final     Glucose   Date Value Ref Range Status   12/07/2021 124 (H) 70 - 99 mg/dL Final   06/23/2021 77 70 - 99 mg/dL Final     Urea Nitrogen   Date Value Ref Range Status   12/07/2021 14 7 - 30 mg/dL Final   06/23/2021 8 7 - 30 mg/dL Final     Creatinine   Date Value Ref Range Status   12/07/2021 1.31 (H) 0.66 - 1.25 mg/dL Final   06/23/2021 1.09 0.66 - 1.25 mg/dL Final     GFR Estimate   Date Value Ref Range Status   12/07/2021 60 (L) >60 mL/min/1.73m2 Final     Comment:     As of July 11, 2021, eGFR is calculated by the CKD-EPI creatinine equation, without race adjustment. eGFR can be influenced by muscle mass, exercise, and diet. The reported eGFR is an estimation only and is only applicable if the renal function is stable.   06/23/2021 75 >60 mL/min/[1.73_m2] Final     Comment:     Non  GFR Calc  Starting 12/18/2018, serum creatinine based estimated GFR (eGFR) will be   calculated using the Chronic Kidney Disease Epidemiology Collaboration   (CKD-EPI) equation.       Calcium   Date Value Ref Range Status   12/07/2021 8.2 (L) 8.5 - 10.1 mg/dL Final   06/23/2021 8.5 8.5 - 10.1 mg/dL Final     Bilirubin Total   Date Value Ref Range Status   12/07/2021 2.3 (H) 0.2 - 1.3 mg/dL Final   06/23/2021 3.6 (H) 0.2 - 1.3 mg/dL Final     Alkaline Phosphatase   Date Value Ref Range Status   12/07/2021 142 40 - 150 U/L Final   06/23/2021 165 (H) 40 - 150 U/L Final     ALT   Date Value Ref Range Status   12/07/2021 23 0 - 70 U/L Final   06/23/2021 19 0 - 70 U/L Final     AST   Date Value Ref Range Status   12/07/2021 37 0 - 45 U/L Final   06/23/2021 33 0 - 45 U/L Final     MRI: I reviewed his MRI from 1/3/22 which did not show any concerning lesion.    A/P: Mr. Bell is overall doing well but has had some recurrence of his ascites.  I believe he needs to have a TIPS revision and we will schedule him for that as soon as possible.    A total of 15 minutes was spent  in care for the patient, of which >50% was spent in counseling and co-ordination of care.

## 2022-01-11 ENCOUNTER — TELEPHONE (OUTPATIENT)
Dept: VASCULAR SURGERY | Facility: CLINIC | Age: 57
End: 2022-01-11
Payer: COMMERCIAL

## 2022-01-11 DIAGNOSIS — Z11.59 ENCOUNTER FOR SCREENING FOR OTHER VIRAL DISEASES: Primary | ICD-10-CM

## 2022-01-11 NOTE — TELEPHONE ENCOUNTER
Called pt and left a msg    Informed him that we have him scheduled for his tips revision with Dr. Tristan    Fri 1/28  Check in at 7am for an 830am start time    All prep reviewed    Need covid testing 4 days  Need     Will send out letter and post on [place]    He is to call me with any questions.     Denise LAZARO RN, BSN  Interventional Radiology/Vascular  Nurse Coordinator   Phone: [phone]  Fax: [phone]

## 2022-01-11 NOTE — TELEPHONE ENCOUNTER
Yes his new insurance is requesting a Prior Auth due to  Patients age.      If any questions please contact the site directly.    Thank you,  Abby Ang [facility] Float Technician

## 2022-01-12 NOTE — TELEPHONE ENCOUNTER
"Message from [employer]:  Original authorizing provider: Gonzalo Bales MD    Ivan Bell would like a refill of the following medications:  carvedilol (COREG) 6.25 MG tablet [Gonzalo Bales MD]    Preferred pharmacy: [facility] [address] [facility]    Comment:  Ivan is now out of this medication and the pharmacy has been trying to verify its renewal since December 29, via phone and fax ... no response yet. please advise if he is to continue, as the bottle does state \"1 one refill left\".  " Subjective      The patient is a 73-year-old female with history of hypertension, hyperlipidemia, chronic low back pain, and severe COPD.  The patient currently is a nursing home resident.  The patient became more weak, tired, and less active, having low-grade fever.  The patient had blood cultures done in the nursing home that grew gram-negative ESBL with elevated procalcitonin.  The patient had been on cefepime for 2 days.  She had not improved much and the patient was sent to [facility] for further evaluation and therapy.  The patient was admitted to general medical floor for further care. The patient is afebrile, not in acute distress; appears alert and oriented at her baseline. Patient is Ok to be transferred back to NH.      Objective     I/O's    Intake/Output Summary (Last 24 hours) at 1/12/2022 1422  Last data filed at 1/12/2022 0900  Gross per 24 hour   Intake 1615.97 ml   Output --   Net 1615.97 ml       Last Recorded Vitals  Blood pressure 128/77, pulse 66, temperature 97.9 °F (36.6 °C), temperature source Axillary, resp. rate 12, height 4' 8\" (1.422 m), weight 39.4 kg (86 lb 13.8 oz), SpO2 92 %.  Body mass index is 19.47 kg/m².      General:  A&O x 2; malnourished  Skin:  Warm and dry without rash.    Head:  Normocephalic-atraumatic.   Neck:  Trachea is midline.     Eyes:  Normal conjunctivae and sclerae.   ENT:  Mucous membranes are moist. Poor oral dentition.   Cardiovascular:  Symmetrical pulses.  Regular rate and rhythm without murmur.  Respiratory:   Normal respiratory effort. Very diminished bronchial BS; no crackles.   Gastrointestinal:  Soft and nontender.  Normal bowel sounds.  No hepatomegaly or splenomegaly.   Musculoskeletal:  No deformity or edema.   Back:  Normal alignment.  No costovertebral angle tenderness.  Neurologic:  No focal deficits.  Psychiatric:  Cooperative.  Appropriate mood and affect.    Labs   Today’s Results:    Recent Labs   Lab 01/12/22  6355  01/11/22  0609   WBC 7.5 8.2   HCT 27.6* 29.0*   HGB 8.2* 8.4*   * 525*     Recent Labs   Lab 01/12/22  0509 01/11/22  0609   SODIUM 140 145   POTASSIUM 3.4 3.7   CHLORIDE 116* 119*   CO2 16* 18*   GLUCOSE 64* 85   BUN 8 11   CREATININE 0.52 0.56     CXR 01/09/22:    Impression:     1.    No acute pulmonary findings or active infiltrate.   2.    Mild cardiomegaly.          Assessment & Plan     UTI  Sepsis due to E coli/ESBL; patient had been on IV antibiotic therapy for 2 days in the NH  Acute mental status changes due to acute metabolic encephalopathy secondary to UTI   Dehydration upon admission  Hypernatremia upon admission, corrected  Hypokalemia upon admission, corrected  Anemia, chronic; will follow Hgb.   COPD   Shortness of breath  H/o heavy smoking  Chronic low back pain  Severe protein calorie malnutrition     DVT Prophylaxis  SCD    Smoking Cessation   Quit smoking 2 years ago; she had 50 p/y history of smoking    Transfer to NH; Meds: see list; Diet: cardiac; Activity: as tolerated.      Donis Hernandez MD

## 2022-01-12 NOTE — TELEPHONE ENCOUNTER
"Prior Authorization not available.   Patient should contact Member Services by calling the number on the back of the ID card and reference the reject 66 \"Patient Age Exceeds Max\" transmission message on the claim for assistance with processing.      Medication: potassium chloride ER (KLOR-CON M) 20 MEQ CR tablet  Insurance Company: [employer] ([facility]) - Phone [phone] Fax [phone]  Expected Co[employer]:      Pharmacy Filling the Rx: [facility] - [id number] [facility] [place]  Pharmacy Notified: Yes  Patient Notified: No      "

## 2022-01-12 NOTE — TELEPHONE ENCOUNTER
I received a call from the pharmacy and they had called the patient insurance and per the insurance, In the form, it has to state Patient Age Exceeds Max with the prior authorization.     I let the  Pharmacy know that I can reroute the encounter to Nadiya to resubmit either manually or electronically through CMM. Per the insurance, if that statement of Patient Age Exceeds Maximum it won't get canceled.

## 2022-01-12 NOTE — TELEPHONE ENCOUNTER
"I have resubmitted P/A request with attached chart notes, and the notation \"Patient Age Exceeds Max\"        PA Initiation    Medication: potassium chloride ER (KLOR-CON M) 20 MEQ CR tablet  Insurance Company: [employer] ([facility]) - Phone [phone] Fax [phone]  Pharmacy Filling the Rx: [facility]A [facility]ORD[facility] - [id number] [facility]  Filling Pharmacy Phone: [phone]  Filling Pharmacy Fax:    Start Date: 1/10/2022            "

## 2022-01-13 NOTE — TELEPHONE ENCOUNTER
Left message with pts spouse Elicia with the information obtained regarding PA for potassium. Instructed to call member services for more information. Recommended pt increase intake of potassium rich foods and will follow with labs per provider.

## 2022-01-13 NOTE — TELEPHONE ENCOUNTER
"I have resubmitted P/A request with attached chart notes, and the notation \"Patient Age Exceeds Max\", and again it is CANCELED.  PATIENT NEEDS TO CALL MEMBER SERVICES.            "

## 2022-01-18 ENCOUNTER — TELEPHONE (OUTPATIENT)
Dept: BEHAVIORAL HEALTH | Facility: CLINIC | Age: 57
End: 2022-01-18
Payer: COMMERCIAL

## 2022-01-18 VITALS — HEIGHT: 70 IN | WEIGHT: 168 LBS | BODY MASS INDEX: 24.05 KG/M2

## 2022-01-19 ENCOUNTER — PATIENT OUTREACH (OUTPATIENT)
Dept: GASTROENTEROLOGY | Facility: CLINIC | Age: 57
End: 2022-01-19
Payer: COMMERCIAL

## 2022-01-19 ENCOUNTER — ANCILLARY PROCEDURE (OUTPATIENT)
Dept: ULTRASOUND IMAGING | Facility: CLINIC | Age: 57
End: 2022-01-19
Attending: INTERNAL MEDICINE
Payer: COMMERCIAL

## 2022-01-19 ENCOUNTER — OFFICE VISIT (OUTPATIENT)
Dept: INFUSION THERAPY | Facility: CLINIC | Age: 57
End: 2022-01-19
Attending: INTERNAL MEDICINE
Payer: COMMERCIAL

## 2022-01-19 VITALS
BODY MASS INDEX: 23.93 KG/M2 | DIASTOLIC BLOOD PRESSURE: 77 MMHG | HEART RATE: 76 BPM | WEIGHT: 166.8 LBS | RESPIRATION RATE: 16 BRPM | SYSTOLIC BLOOD PRESSURE: 156 MMHG | TEMPERATURE: 97.7 F

## 2022-01-19 DIAGNOSIS — K70.31 ALCOHOLIC CIRRHOSIS OF LIVER WITH ASCITES (H): Primary | ICD-10-CM

## 2022-01-19 LAB
PLATELET # BLD AUTO: 61 10E3/UL (ref 150–450)
SARS-COV-2 RNA RESP QL NAA+PROBE: NEGATIVE

## 2022-01-19 PROCEDURE — 85049 AUTOMATED PLATELET COUNT: CPT | Performed by: INTERNAL MEDICINE

## 2022-01-19 PROCEDURE — 49083 ABD PARACENTESIS W/IMAGING: CPT | Mod: GC | Performed by: RADIOLOGY

## 2022-01-19 PROCEDURE — 250N000009 HC RX 250: Performed by: INTERNAL MEDICINE

## 2022-01-19 PROCEDURE — 36415 COLL VENOUS BLD VENIPUNCTURE: CPT | Performed by: INTERNAL MEDICINE

## 2022-01-19 PROCEDURE — 272N000706 US PARACENTESIS

## 2022-01-19 PROCEDURE — P9047 ALBUMIN (HUMAN), 25%, 50ML: HCPCS | Performed by: INTERNAL MEDICINE

## 2022-01-19 PROCEDURE — U0005 INFEC AGEN DETEC AMPLI PROBE: HCPCS | Performed by: INTERNAL MEDICINE

## 2022-01-19 PROCEDURE — 250N000011 HC RX IP 250 OP 636: Performed by: INTERNAL MEDICINE

## 2022-01-19 RX ORDER — ALBUMIN (HUMAN) 12.5 G/50ML
12.5 SOLUTION INTRAVENOUS
Status: CANCELLED | OUTPATIENT
Start: 2022-01-19

## 2022-01-19 RX ORDER — HEPARIN SODIUM,PORCINE 10 UNIT/ML
5 VIAL (ML) INTRAVENOUS
Status: CANCELLED | OUTPATIENT
Start: 2022-01-19

## 2022-01-19 RX ORDER — ALBUMIN (HUMAN) 12.5 G/50ML
12.5 SOLUTION INTRAVENOUS
Status: DISCONTINUED | OUTPATIENT
Start: 2022-01-19 | End: 2022-01-19 | Stop reason: HOSPADM

## 2022-01-19 RX ORDER — NALOXONE HYDROCHLORIDE 0.4 MG/ML
0.2 INJECTION, SOLUTION INTRAMUSCULAR; INTRAVENOUS; SUBCUTANEOUS
Status: CANCELLED | OUTPATIENT
Start: 2022-01-19

## 2022-01-19 RX ORDER — ALBUTEROL SULFATE 0.83 MG/ML
2.5 SOLUTION RESPIRATORY (INHALATION)
Status: CANCELLED | OUTPATIENT
Start: 2022-01-19

## 2022-01-19 RX ORDER — LIDOCAINE HYDROCHLORIDE 10 MG/ML
20 INJECTION, SOLUTION EPIDURAL; INFILTRATION; INTRACAUDAL; PERINEURAL ONCE
Status: COMPLETED | OUTPATIENT
Start: 2022-01-19 | End: 2022-01-19

## 2022-01-19 RX ORDER — DIPHENHYDRAMINE HYDROCHLORIDE 50 MG/ML
50 INJECTION INTRAMUSCULAR; INTRAVENOUS
Status: CANCELLED
Start: 2022-01-19

## 2022-01-19 RX ORDER — LIDOCAINE HYDROCHLORIDE 10 MG/ML
20 INJECTION, SOLUTION EPIDURAL; INFILTRATION; INTRACAUDAL; PERINEURAL ONCE
Status: CANCELLED | OUTPATIENT
Start: 2022-01-19 | End: 2022-01-19

## 2022-01-19 RX ORDER — EPINEPHRINE 1 MG/ML
0.3 INJECTION, SOLUTION INTRAMUSCULAR; SUBCUTANEOUS EVERY 5 MIN PRN
Status: CANCELLED | OUTPATIENT
Start: 2022-01-19

## 2022-01-19 RX ORDER — HEPARIN SODIUM (PORCINE) LOCK FLUSH IV SOLN 100 UNIT/ML 100 UNIT/ML
5 SOLUTION INTRAVENOUS
Status: CANCELLED | OUTPATIENT
Start: 2022-01-19

## 2022-01-19 RX ORDER — MEPERIDINE HYDROCHLORIDE 25 MG/ML
25 INJECTION INTRAMUSCULAR; INTRAVENOUS; SUBCUTANEOUS EVERY 30 MIN PRN
Status: CANCELLED | OUTPATIENT
Start: 2022-01-19

## 2022-01-19 RX ORDER — ALBUTEROL SULFATE 90 UG/1
1-2 AEROSOL, METERED RESPIRATORY (INHALATION)
Status: CANCELLED
Start: 2022-01-19

## 2022-01-19 RX ORDER — METHYLPREDNISOLONE SODIUM SUCCINATE 125 MG/2ML
125 INJECTION, POWDER, LYOPHILIZED, FOR SOLUTION INTRAMUSCULAR; INTRAVENOUS
Status: CANCELLED
Start: 2022-01-19

## 2022-01-19 RX ADMIN — ALBUMIN HUMAN 12.5 G: 0.25 SOLUTION INTRAVENOUS at 14:07

## 2022-01-19 RX ADMIN — ALBUMIN HUMAN 12.5 G: 0.25 SOLUTION INTRAVENOUS at 14:22

## 2022-01-19 RX ADMIN — ALBUMIN HUMAN 12.5 G: 0.25 SOLUTION INTRAVENOUS at 14:14

## 2022-01-19 RX ADMIN — LIDOCAINE HYDROCHLORIDE 10 ML: 10 INJECTION, SOLUTION EPIDURAL; INFILTRATION; INTRACAUDAL; PERINEURAL at 14:07

## 2022-01-19 NOTE — LETTER
1/19/2022         RE: Ivan Bell  [address] [address] [address]        Dear Colleague,    Thank you for referring your patient, Ivan Bell, to the [facility] TREAT[facility]. Please see a copy of my visit note below.    Paracentesis Nursing Note  Ivan Bell presents today to [facility]acent[facility].    During today's appointment orders from Dr. Bales were completed.    Progress Note:  Patient identification verified by name and date of birth.  Assessment completed.  Vitals monitored throughout appointment and recorded in Doc Flowsheets.  See proceduralist note in ultrasound.    Vascular Access: peripheral IV placed today.  Labs: were drawn per orders.     Date of consent or authorization: 12/27/21.  Invasive Procedure Safety Checklist was completed and sent for scanning.     Paracentesis performed by Dr. Cox.    The following labs were communicated to provider performing paracentesis:  Lab Results   Component Value Date    PLT 60 12/07/2021    PLT 58 06/23/2021       Total amount of ascites fluid drained: 6.2 liters.  Color of ascites fluid: yellow.  Total amount of albumin given: 37.5  grams.    Patient tolerated procedure well.    Post procedure,denies pain or discomfort post paracentesis.    Asymptomatic COVID test date: 12/23/21 - none performed today, having TIPS procedure done next week (If next appt is within 2 weeks, COVID test to be done in [facility])      Discharge Plan:  Discharge instructions were reviewed with patient.  Patient/Representative verbalized understanding and all questions were answered.   Discharged from [facility] in stable condition.    Administrations This Visit     albumin human 25 % injection 12.5 g     Admin Date  01/19/2022 Action  New Bag Dose  12.5 g Route  Intravenous Administered By  Alma Lieberman RN           Admin Date  01/19/2022 Action  New Bag  Dose  12.5 g Route  Intravenous Administered By  Alma Lieberman RN           Admin Date  01/19/2022 Action  New Bag Dose  12.5 g Route  Intravenous Administered By  Rianna Chicas RN          lidocaine (PF) (XYLOCAINE) 1 % injection 20 mL     Admin Date  01/19/2022 Action  Given Dose  10 mL Route  Subcutaneous Administered By  Alma Lieberman RN Hope Balcos, RN    BP (!) 149/81 (Patient Position: Standing)   Pulse 77   Temp 97.7  F (36.5  C) (Oral)   Resp 16   Wt 81.6 kg (179 lb 12.8 oz)   BMI 25.80 kg/m              Again, thank you for allowing me to participate in the care of your patient.      Sincerely,    [facility] Paracentesis Provider

## 2022-01-19 NOTE — PROGRESS NOTES
Paracentesis Nursing Note  Ivan Bell presents today to [facility] for a [facility]acentesis.    During today's appointment orders from Dr. Bales were completed.    Progress Note:  Patient identification verified by name and date of birth.  Assessment completed.  Vitals monitored throughout appointment and recorded in Doc Flowsheets.  See proceduralist note in ultrasound.    Vascular Access: peripheral IV placed today.  Labs: were drawn per orders.     Date of consent or authorization: 12/27/21.  Invasive Procedure Safety Checklist was completed and sent for scanning.     Paracentesis performed by Dr. Cox.    The following labs were communicated to provider performing paracentesis:  Lab Results   Component Value Date    PLT 60 12/07/2021    PLT 58 06/23/2021       Total amount of ascites fluid drained: 6.2 liters.  Color of ascites fluid: yellow.  Total amount of albumin given: 37.5  grams.    Patient tolerated procedure well.    Post procedure,denies pain or discomfort post paracentesis.    Asymptomatic COVID test date: 12/23/21 - none performed today, having TIPS procedure done next week (If next appt is within 2 weeks, COVID test to be done in [facility])      Discharge Plan:  Discharge instructions were reviewed with patient.  Patient/Representative verbalized understanding and all questions were answered.   Discharged from [facility] in stable condition.    Administrations This Visit     albumin human 25 % injection 12.5 g     Admin Date  01/19/2022 Action  New Bag Dose  12.5 g Route  Intravenous Administered By  Amla Lieberman RN           Admin Date  01/19/2022 Action  New Bag Dose  12.5 g Route  Intravenous Administered By  Amla Lieberman RN           Admin Date  01/19/2022 Action  New Bag Dose  12.5 g Route  Intravenous Administered By  Rianna Chicas RN          lidocaine (PF) (XYLOCAINE) 1 % injection 20 mL     Admin Date  01/19/2022  Action  Given Dose  10 mL Route  Subcutaneous Administered By  Alma Lieberman RN Hope Balcos, RN        BP (!) 149/81 (Patient Position: Standing)   Pulse 77   Temp 97.7  F (36.5  C) (Oral)   Resp 16   Wt 81.6 kg (179 lb 12.8 oz)   BMI 25.80 kg/m

## 2022-01-19 NOTE — PROGRESS NOTES
Patient is having TIPS revision on 1/28 and is in need of a pre procedure covid test. Provided the covid scheduling number to pt. Patient asking questions regarding his increased need for paracentesis. Discussed that hopefully the TIPS revision will improve this, and that revision is not uncommon. Patient verbalized understanding and will call to schedule his covid test.

## 2022-01-26 ENCOUNTER — LAB (OUTPATIENT)
Dept: LAB | Facility: CLINIC | Age: 57
End: 2022-01-26
Attending: RADIOLOGY
Payer: COMMERCIAL

## 2022-01-26 DIAGNOSIS — Z11.59 ENCOUNTER FOR SCREENING FOR OTHER VIRAL DISEASES: ICD-10-CM

## 2022-01-26 PROCEDURE — U0005 INFEC AGEN DETEC AMPLI PROBE: HCPCS

## 2022-01-26 PROCEDURE — U0003 INFECTIOUS AGENT DETECTION BY NUCLEIC ACID (DNA OR RNA); SEVERE ACUTE RESPIRATORY SYNDROME CORONAVIRUS 2 (SARS-COV-2) (CORONAVIRUS DISEASE [COVID-19]), AMPLIFIED PROBE TECHNIQUE, MAKING USE OF HIGH THROUGHPUT TECHNOLOGIES AS DESCRIBED BY CMS-2020-01-R: HCPCS

## 2022-01-26 NOTE — DISCHARGE INSTRUCTIONS
Radiology  Discharge Instructions for Abdominal Paracentesis    You have had an abdominal paracentesis procedure today.  A needle or catheter was put into your abdomen to remove fluid for laboratory studies and/or to remove the extra fluid from your abdomen.    AFTER YOU ARE HOME:    Rest at home today.    Limit physical activity such as lifting, straining, or exercise for 48 hours.  You may resume normal activity in 24 hours.    Resume previous diet and medications.    You may remove bandage in 24 hours.    CALL YOUR PRIMARY PROVIDER IF:    You develop temperature over 101o F, or redness at the drainage site.    You have any other questions or concerns.    AFTER HOURS CALL [facility] AT [phone]    COME TO EMERGENCY ROOM IF:    You develop severe abdominal pain, swelling the size of a baseball or larger, continuous oozing from puncture site longer than 24 hours, bleeding that saturates a gauze dressing even after you have put direct pressure on the site for 15 minutes, severe lightheadedness or fainting.  DO NOT DRIVE YOURSELF.      Your ordering physician will contact you with the laboratory results.        ADDITIONAL INSTRUCTIONS:   ***    I have reviewed and understand these instructions.      __________________________________________________  Ivan Gonsior      __________________________________________________     Nurse/Radiologist Signature      Date: 10/12/2017             0

## 2022-01-27 LAB — SARS-COV-2 RNA RESP QL NAA+PROBE: NEGATIVE

## 2022-01-28 ENCOUNTER — APPOINTMENT (OUTPATIENT)
Dept: INTERVENTIONAL RADIOLOGY/VASCULAR | Facility: CLINIC | Age: 57
End: 2022-01-28
Attending: RADIOLOGY
Payer: COMMERCIAL

## 2022-01-28 ENCOUNTER — HOSPITAL ENCOUNTER (OUTPATIENT)
Facility: CLINIC | Age: 57
Discharge: HOME | End: 2022-01-28
Attending: RADIOLOGY | Admitting: RADIOLOGY
Payer: COMMERCIAL

## 2022-01-28 ENCOUNTER — APPOINTMENT (OUTPATIENT)
Dept: MEDSURG UNIT | Facility: CLINIC | Age: 57
End: 2022-01-28
Attending: RADIOLOGY
Payer: COMMERCIAL

## 2022-01-28 VITALS
TEMPERATURE: 98 F | DIASTOLIC BLOOD PRESSURE: 76 MMHG | BODY MASS INDEX: 24.2 KG/M2 | WEIGHT: 169 LBS | OXYGEN SATURATION: 100 % | SYSTOLIC BLOOD PRESSURE: 121 MMHG | RESPIRATION RATE: 16 BRPM | HEART RATE: 66 BPM | HEIGHT: 70 IN

## 2022-01-28 DIAGNOSIS — K70.31 ALCOHOLIC CIRRHOSIS OF LIVER WITH ASCITES (H): ICD-10-CM

## 2022-01-28 LAB
ALBUMIN SERPL-MCNC: 2.2 G/DL (ref 3.4–5)
ALP SERPL-CCNC: 161 U/L (ref 40–150)
ALT SERPL W P-5'-P-CCNC: 19 U/L (ref 0–70)
ANION GAP SERPL CALCULATED.3IONS-SCNC: 6 MMOL/L (ref 3–14)
APTT PPP: 40 SECONDS (ref 22–38)
AST SERPL W P-5'-P-CCNC: 36 U/L (ref 0–45)
BILIRUB DIRECT SERPL-MCNC: 0.8 MG/DL (ref 0–0.2)
BILIRUB SERPL-MCNC: 1.6 MG/DL (ref 0.2–1.3)
BUN SERPL-MCNC: 13 MG/DL (ref 7–30)
CALCIUM SERPL-MCNC: 8.3 MG/DL (ref 8.5–10.1)
CHLORIDE BLD-SCNC: 115 MMOL/L (ref 94–109)
CO2 SERPL-SCNC: 24 MMOL/L (ref 20–32)
CREAT SERPL-MCNC: 1.3 MG/DL (ref 0.66–1.25)
ERYTHROCYTE [DISTWIDTH] IN BLOOD BY AUTOMATED COUNT: 18.5 % (ref 10–15)
GFR SERPL CREATININE-BSD FRML MDRD: 64 ML/MIN/1.73M2
GLUCOSE BLD-MCNC: 105 MG/DL (ref 70–99)
HCT VFR BLD AUTO: 33.1 % (ref 40–53)
HGB BLD-MCNC: 11.1 G/DL (ref 13.3–17.7)
INR PPP: 1.57 (ref 0.85–1.15)
MCH RBC QN AUTO: 32.9 PG (ref 26.5–33)
MCHC RBC AUTO-ENTMCNC: 33.5 G/DL (ref 31.5–36.5)
MCV RBC AUTO: 98 FL (ref 78–100)
PLATELET # BLD AUTO: 56 10E3/UL (ref 150–450)
POTASSIUM BLD-SCNC: 3.5 MMOL/L (ref 3.4–5.3)
PROT SERPL-MCNC: 5.7 G/DL (ref 6.8–8.8)
RBC # BLD AUTO: 3.37 10E6/UL (ref 4.4–5.9)
SODIUM SERPL-SCNC: 145 MMOL/L (ref 133–144)
WBC # BLD AUTO: 3.6 10E3/UL (ref 4–11)

## 2022-01-28 PROCEDURE — C1769 GUIDE WIRE: HCPCS

## 2022-01-28 PROCEDURE — 49083 ABD PARACENTESIS W/IMAGING: CPT | Mod: GC | Performed by: RADIOLOGY

## 2022-01-28 PROCEDURE — 999N000134 HC STATISTIC PP CARE STAGE 3

## 2022-01-28 PROCEDURE — 85610 PROTHROMBIN TIME: CPT | Performed by: NURSE PRACTITIONER

## 2022-01-28 PROCEDURE — 272N000302 HC DEVICE INFLATION CR5

## 2022-01-28 PROCEDURE — C1887 CATHETER, GUIDING: HCPCS

## 2022-01-28 PROCEDURE — 37183 REVISION TIPS: CPT

## 2022-01-28 PROCEDURE — 80048 BASIC METABOLIC PNL TOTAL CA: CPT | Performed by: NURSE PRACTITIONER

## 2022-01-28 PROCEDURE — 37183 REVISION TIPS: CPT | Mod: GC | Performed by: RADIOLOGY

## 2022-01-28 PROCEDURE — C1725 CATH, TRANSLUMIN NON-LASER: HCPCS

## 2022-01-28 PROCEDURE — 272N000504 HC NEEDLE CR4

## 2022-01-28 PROCEDURE — 255N000002 HC RX 255 OP 636: Performed by: RADIOLOGY

## 2022-01-28 PROCEDURE — 272N000570 HC SHEATH CR7

## 2022-01-28 PROCEDURE — 36415 COLL VENOUS BLD VENIPUNCTURE: CPT | Performed by: NURSE PRACTITIONER

## 2022-01-28 PROCEDURE — 250N000009 HC RX 250: Performed by: STUDENT IN AN ORGANIZED HEALTH CARE EDUCATION/TRAINING PROGRAM

## 2022-01-28 PROCEDURE — 85730 THROMBOPLASTIN TIME PARTIAL: CPT | Performed by: NURSE PRACTITIONER

## 2022-01-28 PROCEDURE — 250N000011 HC RX IP 250 OP 636: Performed by: STUDENT IN AN ORGANIZED HEALTH CARE EDUCATION/TRAINING PROGRAM

## 2022-01-28 PROCEDURE — 82040 ASSAY OF SERUM ALBUMIN: CPT | Performed by: NURSE PRACTITIONER

## 2022-01-28 PROCEDURE — 49083 ABD PARACENTESIS W/IMAGING: CPT

## 2022-01-28 PROCEDURE — 272N000500 HC NEEDLE CR2

## 2022-01-28 PROCEDURE — 85027 COMPLETE CBC AUTOMATED: CPT | Performed by: NURSE PRACTITIONER

## 2022-01-28 PROCEDURE — 999N000142 HC STATISTIC PROCEDURE PREP ONLY

## 2022-01-28 RX ORDER — LIDOCAINE 40 MG/G
CREAM TOPICAL
Status: DISCONTINUED | OUTPATIENT
Start: 2022-01-28 | End: 2022-01-28 | Stop reason: HOSPADM

## 2022-01-28 RX ORDER — NALOXONE HYDROCHLORIDE 0.4 MG/ML
0.4 INJECTION, SOLUTION INTRAMUSCULAR; INTRAVENOUS; SUBCUTANEOUS
Status: DISCONTINUED | OUTPATIENT
Start: 2022-01-28 | End: 2022-01-28 | Stop reason: HOSPADM

## 2022-01-28 RX ORDER — NALOXONE HYDROCHLORIDE 0.4 MG/ML
0.2 INJECTION, SOLUTION INTRAMUSCULAR; INTRAVENOUS; SUBCUTANEOUS
Status: DISCONTINUED | OUTPATIENT
Start: 2022-01-28 | End: 2022-01-28 | Stop reason: HOSPADM

## 2022-01-28 RX ORDER — FENTANYL CITRATE 50 UG/ML
25-50 INJECTION, SOLUTION INTRAMUSCULAR; INTRAVENOUS EVERY 5 MIN PRN
Status: DISCONTINUED | OUTPATIENT
Start: 2022-01-28 | End: 2022-01-28 | Stop reason: HOSPADM

## 2022-01-28 RX ORDER — SODIUM CHLORIDE 9 MG/ML
INJECTION, SOLUTION INTRAVENOUS CONTINUOUS
Status: DISCONTINUED | OUTPATIENT
Start: 2022-01-28 | End: 2022-01-28 | Stop reason: HOSPADM

## 2022-01-28 RX ORDER — HEPARIN SODIUM 200 [USP'U]/100ML
1 INJECTION, SOLUTION INTRAVENOUS CONTINUOUS PRN
Status: DISCONTINUED | OUTPATIENT
Start: 2022-01-28 | End: 2022-01-28 | Stop reason: HOSPADM

## 2022-01-28 RX ORDER — IODIXANOL 320 MG/ML
50 INJECTION, SOLUTION INTRAVASCULAR ONCE
Status: COMPLETED | OUTPATIENT
Start: 2022-01-28 | End: 2022-01-28

## 2022-01-28 RX ORDER — FLUMAZENIL 0.1 MG/ML
0.2 INJECTION, SOLUTION INTRAVENOUS
Status: DISCONTINUED | OUTPATIENT
Start: 2022-01-28 | End: 2022-01-28 | Stop reason: HOSPADM

## 2022-01-28 RX ADMIN — FENTANYL CITRATE 50 MCG: 50 INJECTION INTRAMUSCULAR; INTRAVENOUS at 08:54

## 2022-01-28 RX ADMIN — FENTANYL CITRATE 25 MCG: 50 INJECTION INTRAMUSCULAR; INTRAVENOUS at 08:49

## 2022-01-28 RX ADMIN — MIDAZOLAM 1 MG: 1 INJECTION INTRAMUSCULAR; INTRAVENOUS at 08:54

## 2022-01-28 RX ADMIN — MIDAZOLAM 0.5 MG: 1 INJECTION INTRAMUSCULAR; INTRAVENOUS at 08:46

## 2022-01-28 RX ADMIN — FENTANYL CITRATE 25 MCG: 50 INJECTION INTRAMUSCULAR; INTRAVENOUS at 08:46

## 2022-01-28 RX ADMIN — MIDAZOLAM 1 MG: 1 INJECTION INTRAMUSCULAR; INTRAVENOUS at 09:00

## 2022-01-28 RX ADMIN — MIDAZOLAM 1 MG: 1 INJECTION INTRAMUSCULAR; INTRAVENOUS at 08:43

## 2022-01-28 RX ADMIN — FENTANYL CITRATE 50 MCG: 50 INJECTION INTRAMUSCULAR; INTRAVENOUS at 08:42

## 2022-01-28 RX ADMIN — MIDAZOLAM 0.5 MG: 1 INJECTION INTRAMUSCULAR; INTRAVENOUS at 08:49

## 2022-01-28 RX ADMIN — IODIXANOL 30 ML: 320 INJECTION, SOLUTION INTRAVASCULAR at 09:40

## 2022-01-28 RX ADMIN — LIDOCAINE HYDROCHLORIDE 2 ML: 10 INJECTION, SOLUTION EPIDURAL; INFILTRATION; INTRACAUDAL; PERINEURAL at 08:55

## 2022-01-28 RX ADMIN — FENTANYL CITRATE 50 MCG: 50 INJECTION INTRAMUSCULAR; INTRAVENOUS at 09:00

## 2022-01-28 ASSESSMENT — MIFFLIN-ST. JEOR: SCORE: 1592.83

## 2022-01-28 NOTE — PROGRESS NOTES
Tolerated bedrest with extension of time, as he was very groggy and slept heavily.  Slept for 2.5 hours, and then woke, but still sleepy.  Per wife, it takes a long time for Ivan to clear sedation.  RIJ site CDI.  RLQ abdomen site CDI.  Reviewed discharge instructions with patient and his wife.  Discharged to home with wife.

## 2022-01-28 NOTE — PROCEDURES
[facility]    Procedure: TIPS revision    Date/Time: 1/28/2022 9:30 AM  Performed by: Kang Calles  Authorized by: Kang Calles    Fellow Physician: Marli  Other(s) attending procedure: Young - staff      UNIVERSAL PROTOCOL   Site Marked: Yes  Prior Images Obtained and Reviewed:  Yes  Required items: Required blood products, implants, devices and special equipment available    Patient identity confirmed:  Verbally with patient  Patient was reevaluated immediately before administering moderate or deep sedation or anesthesia  Confirmation Checklist:  Patient's identity using two indicators  Time out: Immediately prior to the procedure a time out was called    Universal Protocol: the Joint Commission Universal Protocol was followed    Preparation: Patient was prepped and draped in usual sterile fashion    ESBL (mL):  10     ANESTHESIA    Anesthesia: Local infiltration  Local Anesthetic:  Lidocaine 1% without epinephrine  Anesthetic Total (mL):  8      SEDATION  Patient Sedated: Yes    Sedation Type:  Moderate (conscious) sedation  Sedation:  Fentanyl and midazolam  Vital signs: Vital signs monitored during sedation    See dictated procedure note for full details.  Findings: PSG at beginning of case 20 mmHg, final PSG after plasty was 10 mmHg.    Specimens: none    Complications: None    Condition: Stable    Plan: Bedrest 2 hours. Routine cares 2A.      PROCEDURE  Describe Procedure: TIPS revision with 10 mm and then 12 mm high pressure balloon at hepatic venous end of stent.   Patient Tolerance:  Patient tolerated the procedure well with no immediate complications  Length of time physician/provider present for 1:1 monitoring during sedation: 60

## 2022-01-28 NOTE — IR NOTE
Initial Pressures:    Portal Venous End of Stent= 34  Mid Stent= 33  Hepatic Venous End of Stent= 32  Right Atrium= 14    Gradient= (20)        Post Dilation:    Portal Venous End of Stent= 28  Mid Stent= 27  Hepatic Venous End of Stent= 27  Right Atrium= 17    Gradient= (11)

## 2022-01-28 NOTE — PRE-PROCEDURE
GENERAL PRE-PROCEDURE:   Procedure:  TIPS revision and paracentesis  Date/Time:  1/28/2022 7:26 AM    Written consent obtained?: Yes    Risks and benefits: Risks, benefits and alternatives were discussed    Consent given by:  Patient  Patient states understanding of procedure being performed: Yes    Patient's understanding of procedure matches consent: Yes    Procedure consent matches procedure scheduled: Yes    Expected level of sedation:  Moderate  Appropriately NPO:  Yes  ASA Class:  3  Mallampati  :  Grade 2- soft palate, base of uvula, tonsillar pillars, and portion of posterior pharyngeal wall visible  Lungs:  Lungs clear with good breath sounds bilaterally  Heart:  Normal heart sounds and rate  History & Physical reviewed:  History and physical reviewed and no updates needed  Statement of review:  I have reviewed the lab findings, diagnostic data, medications, and the plan for sedation

## 2022-01-28 NOTE — PROGRESS NOTES
Arrived from home for a TIPS revision and paracentesis.  VSS. C/O abdominal discomfort due to fluid accumulation.  PIV placed and labs sent.  H&P current. Consent obtained.  Will be ready for procedure when labs are resulted.

## 2022-01-28 NOTE — IR NOTE
Patient Name: Ivan Bell  Medical Record Number: [id number]  Today's Date: 1/28/2022    Procedure: TIPS venogram and revision, paracentesis  Proceduralist: Dr. Kun Kuo    Procedure Start: 08:40  Procedure end: 0932  Sedation medications administered: 200 mcg fentanyl, 2 mg versed     Report given to:  RN  : none    Other Notes: Pt arrived to IR room 1 from . Consent reviewed. Pt denies any questions or concerns regarding procedure. Pt positioned supine and monitored per protocol. Pt tolerated procedure without any noted complications. Right internal jugular access with manual pressure held until hemostasis achieved.  3700 ml ascites fluid aspirated during paracentesis.  Pt transferred back to .

## 2022-01-28 NOTE — PROGRESS NOTES
Returned from IR s/p TIPS revision and paracentesis.  VSS.  C/o abdominal discomfort.  Right low quadrant site CDI.  RIJ site CDI.  Groggy; sleeping.

## 2022-01-28 NOTE — DISCHARGE INSTRUCTIONS
[facility]      Interventional Radiology  Patient Instructions Following Paracentesis    AFTER YOU GO HOME:  ? If you were given sedation; we recommend that an adult stay with you for the first 24 hours.   No driving or alcoholic beverages for 24 hours.  ? Resume previous diet and medication  ? Limit strenuous physical activity such as lifting (>10 lbs.), straining, or exercising for 48 hours.  You may resume normal activity in 24 hours  ? Change gauze at the puncture site as needed to keep site dry.  Leaking of additional fluid is not uncommon during the first 24-48 hours.    CALL 911:  ? If this is a medical emergency    CALL THE PHYSICIAN:  ? If you develop a fever, severe abdominal pain or excessive bleeding from the paracentesis site within 24 hours of the procedure  ? If you experience severe lightheadedness or fainting, call you doctor immediately or come to the closest Emergency Department.  Do not drive yourself.  ? If you have questions or concerns regarding your procedure call the Radiologist            [facility] INTERVENTIONAL RADIOLOGY DEPARTMENT  Procedure Physician: Dr. Tristan                                         Telephone Numbers: [phone] Monday-Friday 8:00 am to 4:30 pm  [phone] After 4:30 pm Monday-Friday, Weekends & Holidays.   Ask for the Interventional Radiologist on call.  Someone is on call 24 hrs/day  [facility] toll free number: [phone] Monday-Friday 8:00 am to 4:30 pm  [facility] Emergency Dept: [phone]      [facility]  Going Home after TIPS RevisionDr.                                                      After you go home:    Drink plenty of fluids.    Resume your normal diet    Do not scrub the procedure site vigorously for 3 days    No lotion or powder to the puncture site for 3 days    DO NOT do any strenuous exercise or lifting greater than 10 pounds for at least 2 days following your procedure    HOB elevated to at least 30 degrees. Do  not lay flat for at least 2 hours after you go home.  IF YOU WERE GIVEN SEDATION    No driving or alcoholic beverages today    Do not make any important or legal decisions today    We recommend an adult stay with you for the first 24 hours    CALL THE PHYSICIAN IF:    Monitor neck site for bleeding, swelling. If any bleeding or swelling immediately apply pressure and call the doctor.    If you notice any changes in your voice or breathing you should call your doctor immediately & come to the closest Emergency Department.  Do Not Drive Yourself.    You develop nausea or vomiting    You develop hives or a rash or any unexplained itching    ADDITIONAL INSTRUCTIONS:    [facility] INTERVENTIONAL RADIOLOGY DEPARTMENT        Procedure Physician:   Dr. Tristan         Date of procedure:January 28, 2022        Telephone numbers:     [phone]  Monday-Friday 8:00 am to 4:30 pm                                              [phone]  After 4:30 pm Monday-Friday, Weekends & Holidays. Ask for the Interventional Radiologist on call.Someone is available  24 hours/day        [facility] toll free number: [phone]  Monday-Friday 8:00 am to 4:30 pm

## 2022-01-31 ENCOUNTER — TELEPHONE (OUTPATIENT)
Dept: RADIOLOGY | Facility: CLINIC | Age: 57
End: 2022-01-31
Payer: COMMERCIAL

## 2022-01-31 NOTE — TELEPHONE ENCOUNTER
"I called and spoke with Ivan.  He states he has been nauseated and having right side abd pain since TIPS procedure. He feels his stomach is hard. He describes as Dull sharp pain.  Comes and goes.  He is taking 2 pills of tylenol in morning and at HS for pain.  \"All I can take\".  He had pain prior to tips but usually was in the middle.  He said he hernia belly button goes in and out, he doesn't feel like there is fluid in his belly but says he didn't before and they tapped him for 6 liters.  He rates his pain 5-7 and has been laying down on the couch.  Prior to TIPS, he had abd pain but was more in the middle.  He felt dizzy this morning and yesterday, worse today.  No falls.  He had chills Saturday and today but didn't take his temperature.  He denies confusion and though speech is slowed not confused and able to follow line of questioning.  His neck access has a drop of blood underneath the dressing and he had been just taking it easy on the couch.  He denies lower extremity edema.  He is eating and drinking ok, voiding ok and having 2-3 soft stools a day.  Pt denies any respiratory concerns. Pt will reach out to Mary Dr Bales's nurse about setting up a para. He states he thought after the TIPS the fluid would be all gone.  I have encouraged him to get a para set up and to take his temperature if he has chills again.  I will review with Dr Kun Pond, RN, BSN  Interventional Radiology Nurse Coordinator   Phone:  [phone]    "

## 2022-02-01 ENCOUNTER — TELEPHONE (OUTPATIENT)
Dept: VASCULAR SURGERY | Facility: CLINIC | Age: 57
End: 2022-02-01
Payer: COMMERCIAL

## 2022-02-01 NOTE — TELEPHONE ENCOUNTER
Called pt to fup on him sp tips revision with Dr. Tristan as well as msg that he was not feeling well yesterday.    Left a msg asking that he return my call.     Denise LAZARO RN, BSN  Interventional Radiology/Vascular  Nurse Coordinator   Phone: [phone]  Fax: [phone]

## 2022-02-02 ENCOUNTER — HOSPITAL ENCOUNTER (EMERGENCY)
Facility: CLINIC | Age: 57
Discharge: HOME | End: 2022-02-02
Attending: EMERGENCY MEDICINE | Admitting: EMERGENCY MEDICINE
Payer: COMMERCIAL

## 2022-02-02 ENCOUNTER — PATIENT OUTREACH (OUTPATIENT)
Dept: GASTROENTEROLOGY | Facility: CLINIC | Age: 57
End: 2022-02-02

## 2022-02-02 ENCOUNTER — APPOINTMENT (OUTPATIENT)
Dept: CT IMAGING | Facility: CLINIC | Age: 57
End: 2022-02-02
Attending: EMERGENCY MEDICINE
Payer: COMMERCIAL

## 2022-02-02 ENCOUNTER — TELEPHONE (OUTPATIENT)
Dept: VASCULAR SURGERY | Facility: CLINIC | Age: 57
End: 2022-02-02
Payer: COMMERCIAL

## 2022-02-02 ENCOUNTER — APPOINTMENT (OUTPATIENT)
Dept: ULTRASOUND IMAGING | Facility: CLINIC | Age: 57
End: 2022-02-02
Attending: EMERGENCY MEDICINE
Payer: COMMERCIAL

## 2022-02-02 VITALS
BODY MASS INDEX: 23.68 KG/M2 | OXYGEN SATURATION: 100 % | RESPIRATION RATE: 16 BRPM | TEMPERATURE: 97.9 F | WEIGHT: 165 LBS | SYSTOLIC BLOOD PRESSURE: 151 MMHG | DIASTOLIC BLOOD PRESSURE: 72 MMHG

## 2022-02-02 DIAGNOSIS — K70.31 ALCOHOLIC CIRRHOSIS OF LIVER WITH ASCITES (H): Primary | ICD-10-CM

## 2022-02-02 DIAGNOSIS — R10.84 ABDOMINAL PAIN, GENERALIZED: ICD-10-CM

## 2022-02-02 LAB
ALBUMIN SERPL-MCNC: 2.2 G/DL (ref 3.4–5)
ALP SERPL-CCNC: 146 U/L (ref 40–150)
ALT SERPL W P-5'-P-CCNC: 20 U/L (ref 0–70)
ANION GAP SERPL CALCULATED.3IONS-SCNC: 6 MMOL/L (ref 3–14)
APTT PPP: 41 SECONDS (ref 22–38)
AST SERPL W P-5'-P-CCNC: 37 U/L (ref 0–45)
BASOPHILS # BLD AUTO: 0 10E3/UL (ref 0–0.2)
BASOPHILS NFR BLD AUTO: 1 %
BILIRUB SERPL-MCNC: 2.3 MG/DL (ref 0.2–1.3)
BUN SERPL-MCNC: 18 MG/DL (ref 7–30)
CALCIUM SERPL-MCNC: 8.5 MG/DL (ref 8.5–10.1)
CHLORIDE BLD-SCNC: 112 MMOL/L (ref 94–109)
CO2 SERPL-SCNC: 23 MMOL/L (ref 20–32)
CREAT SERPL-MCNC: 1.36 MG/DL (ref 0.66–1.25)
EOSINOPHIL # BLD AUTO: 0.4 10E3/UL (ref 0–0.7)
EOSINOPHIL NFR BLD AUTO: 8 %
ERYTHROCYTE [DISTWIDTH] IN BLOOD BY AUTOMATED COUNT: 18.3 % (ref 10–15)
GFR SERPL CREATININE-BSD FRML MDRD: 60 ML/MIN/1.73M2
GLUCOSE BLD-MCNC: 95 MG/DL (ref 70–99)
HCT VFR BLD AUTO: 32.8 % (ref 40–53)
HGB BLD-MCNC: 10.9 G/DL (ref 13.3–17.7)
IMM GRANULOCYTES # BLD: 0 10E3/UL
IMM GRANULOCYTES NFR BLD: 0 %
INR PPP: 1.65 (ref 0.85–1.15)
LIPASE SERPL-CCNC: 327 U/L (ref 73–393)
LYMPHOCYTES # BLD AUTO: 0.9 10E3/UL (ref 0.8–5.3)
LYMPHOCYTES NFR BLD AUTO: 17 %
MCH RBC QN AUTO: 32.6 PG (ref 26.5–33)
MCHC RBC AUTO-ENTMCNC: 33.2 G/DL (ref 31.5–36.5)
MCV RBC AUTO: 98 FL (ref 78–100)
MONOCYTES # BLD AUTO: 0.5 10E3/UL (ref 0–1.3)
MONOCYTES NFR BLD AUTO: 11 %
NEUTROPHILS # BLD AUTO: 3.1 10E3/UL (ref 1.6–8.3)
NEUTROPHILS NFR BLD AUTO: 63 %
NRBC # BLD AUTO: 0 10E3/UL
NRBC BLD AUTO-RTO: 0 /100
PLATELET # BLD AUTO: 63 10E3/UL (ref 150–450)
POTASSIUM BLD-SCNC: 3.8 MMOL/L (ref 3.4–5.3)
PROT SERPL-MCNC: 5.7 G/DL (ref 6.8–8.8)
RBC # BLD AUTO: 3.34 10E6/UL (ref 4.4–5.9)
SODIUM SERPL-SCNC: 141 MMOL/L (ref 133–144)
WBC # BLD AUTO: 5 10E3/UL (ref 4–11)

## 2022-02-02 PROCEDURE — 85730 THROMBOPLASTIN TIME PARTIAL: CPT | Performed by: PHYSICIAN ASSISTANT

## 2022-02-02 PROCEDURE — 96374 THER/PROPH/DIAG INJ IV PUSH: CPT | Mod: 59 | Performed by: EMERGENCY MEDICINE

## 2022-02-02 PROCEDURE — 250N000011 HC RX IP 250 OP 636: Performed by: EMERGENCY MEDICINE

## 2022-02-02 PROCEDURE — 85610 PROTHROMBIN TIME: CPT | Performed by: PHYSICIAN ASSISTANT

## 2022-02-02 PROCEDURE — 250N000011 HC RX IP 250 OP 636: Performed by: PHYSICIAN ASSISTANT

## 2022-02-02 PROCEDURE — 85025 COMPLETE CBC W/AUTO DIFF WBC: CPT | Performed by: PHYSICIAN ASSISTANT

## 2022-02-02 PROCEDURE — 83690 ASSAY OF LIPASE: CPT | Performed by: PHYSICIAN ASSISTANT

## 2022-02-02 PROCEDURE — 74177 CT ABD & PELVIS W/CONTRAST: CPT

## 2022-02-02 PROCEDURE — 96376 TX/PRO/DX INJ SAME DRUG ADON: CPT | Performed by: EMERGENCY MEDICINE

## 2022-02-02 PROCEDURE — 99285 EMERGENCY DEPT VISIT HI MDM: CPT | Performed by: EMERGENCY MEDICINE

## 2022-02-02 PROCEDURE — 93975 VASCULAR STUDY: CPT

## 2022-02-02 PROCEDURE — 80053 COMPREHEN METABOLIC PANEL: CPT | Performed by: PHYSICIAN ASSISTANT

## 2022-02-02 PROCEDURE — 36415 COLL VENOUS BLD VENIPUNCTURE: CPT | Performed by: PHYSICIAN ASSISTANT

## 2022-02-02 PROCEDURE — 74177 CT ABD & PELVIS W/CONTRAST: CPT | Mod: 26 | Performed by: RADIOLOGY

## 2022-02-02 PROCEDURE — 93975 VASCULAR STUDY: CPT | Mod: 26 | Performed by: RADIOLOGY

## 2022-02-02 PROCEDURE — 99285 EMERGENCY DEPT VISIT HI MDM: CPT | Mod: 25 | Performed by: EMERGENCY MEDICINE

## 2022-02-02 RX ORDER — MORPHINE SULFATE 2 MG/ML
2 INJECTION, SOLUTION INTRAMUSCULAR; INTRAVENOUS
Status: DISCONTINUED | OUTPATIENT
Start: 2022-02-02 | End: 2022-02-02 | Stop reason: HOSPADM

## 2022-02-02 RX ORDER — MORPHINE SULFATE 4 MG/ML
2 INJECTION, SOLUTION INTRAMUSCULAR; INTRAVENOUS ONCE
Status: COMPLETED | OUTPATIENT
Start: 2022-02-02 | End: 2022-02-02

## 2022-02-02 RX ORDER — OXYCODONE HYDROCHLORIDE 5 MG/1
5 TABLET ORAL EVERY 6 HOURS PRN
Qty: 12 TABLET | Refills: 0 | Status: SHIPPED | OUTPATIENT
Start: 2022-02-02 | End: 2022-02-05

## 2022-02-02 RX ORDER — IOPAMIDOL 755 MG/ML
101 INJECTION, SOLUTION INTRAVASCULAR ONCE
Status: COMPLETED | OUTPATIENT
Start: 2022-02-02 | End: 2022-02-02

## 2022-02-02 RX ADMIN — MORPHINE SULFATE 2 MG: 4 INJECTION, SOLUTION INTRAMUSCULAR; INTRAVENOUS at 17:57

## 2022-02-02 RX ADMIN — IOPAMIDOL 101 ML: 755 INJECTION, SOLUTION INTRAVENOUS at 18:02

## 2022-02-02 RX ADMIN — MORPHINE SULFATE 2 MG: 2 INJECTION, SOLUTION INTRAMUSCULAR; INTRAVENOUS at 19:44

## 2022-02-02 ASSESSMENT — ENCOUNTER SYMPTOMS
WEAKNESS: 0
ABDOMINAL PAIN: 1
FATIGUE: 0
BACK PAIN: 0
ABDOMINAL DISTENTION: 0
NAUSEA: 1
FREQUENCY: 0
CONFUSION: 0
COLOR CHANGE: 0
FEVER: 0
VOMITING: 0
DYSURIA: 0
COUGH: 0
DIZZINESS: 0
DIFFICULTY URINATING: 0
ARTHRALGIAS: 0
HEADACHES: 0
CHILLS: 0
EYE PAIN: 0
DIARRHEA: 0
MYALGIAS: 0
CONSTIPATION: 0
CHEST TIGHTNESS: 0
SHORTNESS OF BREATH: 0
SORE THROAT: 0
PALPITATIONS: 0
NECK PAIN: 0

## 2022-02-02 NOTE — TELEPHONE ENCOUNTER
Called pt back  He states that he is not sleeping    He still has ongoing abdominal pain as noted on Mon 1/31.     States Not getting better.     He states that when standing it is worse with 10/10 pain.    5/10 when laying down.    Taking tylenol for pain with no relief    Denies fever, chills, aches.     Informed him that we did consult with Dr. Tristan. Recommendations to go to ER at this time to be further evaluated for ongoing abd pain     He will come to [facility] and I will notify Dr. Tristan.     Denise LAZARO RN, BSN  Interventional Radiology/Vascular  Nurse Coordinator   Phone: [phone]  Fax: [phone]

## 2022-02-02 NOTE — TELEPHONE ENCOUNTER
Patient called to discuss symptoms. He continues to have abdominal pain which prevents him from getting sleep. He describes it as coming and going, but doubles him over when it comes on. Pt states the pain is in the center of his abdomen, with sharp pains on his right side. Patient is passing gas and stool, took lactulose this morning, eating and drinking okay but does report some nausea when the pains are bad. There is a note in the chart from the IR team. Called Denise DOBBS and discussed pt. She plans to call pt to follow up with him.

## 2022-02-02 NOTE — ED PROVIDER NOTES
"    [place] EMERGENCY DEPARTMENT ([facility])  2/02/22  History     Chief Complaint   Patient presents with     Abdominal Pain     The history is provided by the patient and medical records.     Ivan Bell is a 56 year old male with a past medical history significant for EtOhH cirrhosis with ascites and variceal bleeding s/p TIPS placement on 2/2019 w/ subsequent revisions (last done on 1/28/2022), s/p paracentesis (last done on 1/28/2022) who presents to the Emergency Department for evaluation of right upper abdominal pain.  Patient reports he did undergo recent TIPS revision as well as paracentesis on 1/28/2022 with 3700 mL drawn.  Patient reports onset of his abdominal pain on Sunday night (1/30).  He reports significant worsening of his pain last night.  He reports that this particular pain is different than previous episodes of abdominal pain.  He reports it is intermittent, however, reports the pain will last \"hours\" when it is present.  Patient states this is a \"spasming\" pain.  He denies any difficulty with urination.  He has not had any fevers or chills.  He reports he did have some nausea earlier today but states this has resolved.  He has not had any vomiting.  Patient denies any significant abdominal distention since his paracentesis.    Past Medical History  Past Medical History:   Diagnosis Date     Alcoholic cirrhosis of liver (H)      Alcoholic hepatitis 03/2019     Chronic kidney disease     CRF     Depression      Gout      History of transfusion      Hypertension      Hypertension      Hypertriglyceridemia      Left calcaneus fracture 01/09/2006 January 16, 2006: Fell 10 feet from ladder onto left foot on frozen ground on 1/9/06 at home.  Immediate pain and unable to walk- seen at [place] and diagnosed with calcaneus fracture     Nephrolithiasis      Osteoarthritis      Portal vein thrombosis     left occlusion, partial main     Past Surgical History:   Procedure Laterality Date "     ANKLE SURGERY Left      ANKLE SURGERY       ARTHROSCOPY KNEE       ARTHROSCOPY SHOULDER       ARTHROSCOPY SHOULDER ROTATOR CUFF REPAIR       COLONOSCOPY N/A 03/31/2016    Procedure: COLONOSCOPY;  Surgeon: Rhys Uriostegui MD;  Location:  GI     ESOPHAGOSCOPY, GASTROSCOPY, DUODENOSCOPY (EGD), COMBINED N/A 03/31/2016    Procedure: COMBINED ESOPHAGOSCOPY, GASTROSCOPY, DUODENOSCOPY (EGD);  Surgeon: Rhys Uriostegui MD;  Location:  GI     ESOPHAGOSCOPY, GASTROSCOPY, DUODENOSCOPY (EGD), COMBINED N/A 03/09/2018    Procedure: COMBINED ESOPHAGOSCOPY, GASTROSCOPY, DUODENOSCOPY (EGD), BIOPSY SINGLE OR MULTIPLE;  EGD;  Surgeon: Gonzalo Wahl MD;  Location:  GI     ESOPHAGOSCOPY, GASTROSCOPY, DUODENOSCOPY (EGD), COMBINED N/A 06/07/2019    Procedure: ESOPHAGOGASTRODUODENOSCOPY (EGD);  Surgeon: Gonzalo Wahl MD;  Location:  GI     FOOT SURGERY       HIP SURGERY       IR PARACENTESIS  10/29/2019     IR PARACENTESIS  11/25/2020     IR PARACENTESIS  8/11/2021     IR PARACENTESIS  1/28/2022     IR TRANSVEN INTRAHEPATIC PORTOSYST REV  10/29/2019     IR TRANSVEN INTRAHEPATIC PORTOSYST REV  11/25/2020     IR TRANSVEN INTRAHEPATIC PORTOSYST REV  8/11/2021     IR TRANSVEN INTRAHEPATIC PORTOSYST REV  1/28/2022     KNEE SURGERY Left      KNEE SURGERY Right      RELEASE CARPAL TUNNEL       RELEASE TRIGGER FINGER Right 06/11/2020    Procedure: RELEASE, TRIGGER FINGER, right ring and long finger;  Surgeon: Pascual Valencia MD;  Location: [facility]     SIGMOIDOSCOPY FLEXIBLE N/A 10/31/2017    Procedure: SIGMOIDOSCOPY FLEXIBLE;;  Surgeon: Armaan Adams MD;  Location:  GI     TIPS Procedure  06/06/2018     TIPS PROCEDURE  11/01/2020     ZZC PLASTY KNEE,MED OR LAT COMPARTMT Right 02/19/2021    Procedure: RIGHT UNICOMPARTMENTAL ARTHROPLASTY KNEE MEDIAL;  Surgeon: José Miguel Landaverde MD;  Location: [facility];  Service: Orthopedics     oxyCODONE (ROXICODONE) 5 MG  tablet  acetaminophen (TYLENOL) 500 MG tablet  Ascorbic Acid (VITAMIN C PO)  eplerenone (INSPRA) 50 MG tablet  fexofenadine (ALLEGRA) 60 MG tablet  furosemide (LASIX) 20 MG tablet  furosemide (LASIX) 40 MG tablet  lactulose (CHRONULAC) 10 GM/15ML solution  Menaquinone-7 (VITAMIN K2) 100 MCG CAPS  MULTIPLE VITAMINS PO  potassium chloride ER (KLOR-CON M) 20 MEQ CR tablet  rifaximin (XIFAXAN) 550 MG TABS tablet  sertraline (ZOLOFT) 50 MG tablet  sildenafil (REVATIO) 20 MG tablet  sodium bicarbonate 650 MG tablet  vitamin D3 (CHOLECALCIFEROL) 2000 units (50 mcg) tablet      Allergies   Allergen Reactions     Prednisone Visual Disturbance     Trazodone Visual Disturbance     Benadryl [Diphenhydramine] Other (See Comments)     Delirium (visual and auditory hallucinations)     Family History  Family History   Problem Relation Age of Onset     Family History Negative Mother      Family History Negative Father      Hypertension Father      Cerebrovascular Disease Father 87     Breast Cancer Maternal Grandmother      Rheumatoid Arthritis Daughter      Depression Daughter      Substance Abuse Brother      Cancer - colorectal No family hx of      Prostate Cancer No family hx of      Liver Disease No family hx of      Social History   Social History     Tobacco Use     Smoking status: Former Smoker     Packs/day: 0.00     Types: Dip, chew, snus or snuff     Quit date: 1998     Years since quittin.4     Smokeless tobacco: Former User     Types: Chew     Tobacco comment: 1 tin per 10 days.   Substance Use Topics     Alcohol use: No     Alcohol/week: 17.5 standard drinks     Types: 21 Cans of beer per week     Comment: last etoh 14, did have Odouls ~2017     Drug use: No      Past medical history, past surgical history, medications, allergies, family history, and social history were reviewed with the patient. No additional pertinent items.     I have reviewed the Medications, Allergies, Past Medical and Surgical  History, and Social History in the Epic system.    Review of Systems   Constitutional: Negative for chills, fatigue and fever.   HENT: Negative for congestion and sore throat.    Eyes: Negative for pain and visual disturbance.   Respiratory: Negative for cough, chest tightness and shortness of breath.    Cardiovascular: Negative for chest pain and palpitations.   Gastrointestinal: Positive for abdominal pain (RUQ) and nausea (resolved). Negative for abdominal distention, constipation, diarrhea and vomiting.   Genitourinary: Negative for difficulty urinating, dysuria, frequency and urgency.   Musculoskeletal: Negative for arthralgias, back pain, myalgias and neck pain.   Skin: Negative for color change and rash.   Neurological: Negative for dizziness, weakness and headaches.   Psychiatric/Behavioral: Negative for confusion.       Physical Exam   BP: (!) 151/72  Temp: 97.9  F (36.6  C)  Resp: 16  Weight: 74.8 kg (165 lb)  SpO2: 100 %      Physical Exam  Vitals and nursing note reviewed.   Constitutional:       General: He is not in acute distress.     Appearance: Normal appearance. He is not ill-appearing or toxic-appearing.      Comments: Patient appears uncomfortable due to pain.   HENT:      Head: Normocephalic and atraumatic.      Nose: Nose normal.      Mouth/Throat:      Mouth: Mucous membranes are moist.   Eyes:      Pupils: Pupils are equal, round, and reactive to light.   Cardiovascular:      Rate and Rhythm: Normal rate.      Pulses: Normal pulses.      Heart sounds: Normal heart sounds.   Pulmonary:      Effort: Pulmonary effort is normal. No respiratory distress.      Breath sounds: Normal breath sounds.   Abdominal:      General: Abdomen is flat. There is no distension.      Comments: On her tenderness palpation in the epigastric area, right upper quadrant tenderness, right-sided CVA tenderness.  Mild abdominal distention.  Positive fluid wave.  No guarding rebound or other peritoneal signs.  Negative  Zimmer sign.  No McBurney's tenderness.   Musculoskeletal:         General: No swelling or deformity. Normal range of motion.      Cervical back: Normal range of motion. No rigidity.   Skin:     General: Skin is warm.      Capillary Refill: Capillary refill takes less than 2 seconds.   Neurological:      Mental Status: He is alert and oriented to person, place, and time.   Psychiatric:         Mood and Affect: Mood normal.         ED Course     At 5:00 PM the patient was seen and examined by Kevin Krishna DO in Room ED[facility].         Results for orders placed or performed during the hospital encounter of 02/02/22 (from the past 24 hour(s))   [place] Draw *Canceled*    Narrative    The following orders were created for panel order [place] Draw.  Procedure                               Abnormality         Status                     ---------                               -----------         ------                       Please view results for these tests on the individual orders.   CBC with platelets differential    Narrative    The following orders were created for panel order CBC with platelets differential.  Procedure                               Abnormality         Status                     ---------                               -----------         ------                     CBC with platelets and d...[[id number]]  Abnormal            Final result                 Please view results for these tests on the individual orders.   Comprehensive metabolic panel   Result Value Ref Range    Sodium 141 133 - 144 mmol/L    Potassium 3.8 3.4 - 5.3 mmol/L    Chloride 112 (H) 94 - 109 mmol/L    Carbon Dioxide (CO2) 23 20 - 32 mmol/L    Anion Gap 6 3 - 14 mmol/L    Urea Nitrogen 18 7 - 30 mg/dL    Creatinine 1.36 (H) 0.66 - 1.25 mg/dL    Calcium 8.5 8.5 - 10.1 mg/dL    Glucose 95 70 - 99 mg/dL    Alkaline Phosphatase 146 40 - 150 U/L    AST 37 0 - 45 U/L    ALT 20 0 - 70 U/L    Protein Total 5.7 (L) 6.8 - 8.8 g/dL    Albumin  2.2 (L) 3.4 - 5.0 g/dL    Bilirubin Total 2.3 (H) 0.2 - 1.3 mg/dL    GFR Estimate 60 (L) >60 mL/min/1.73m2   Lipase   Result Value Ref Range    Lipase 327 73 - 393 U/L   INR   Result Value Ref Range    INR 1.65 (H) 0.85 - 1.15   PTT   Result Value Ref Range    aPTT 41 (H) 22 - 38 Seconds   CBC with platelets and differential   Result Value Ref Range    WBC Count 5.0 4.0 - 11.0 10e3/uL    RBC Count 3.34 (L) 4.40 - 5.90 10e6/uL    Hemoglobin 10.9 (L) 13.3 - 17.7 g/dL    Hematocrit 32.8 (L) 40.0 - 53.0 %    MCV 98 78 - 100 fL    MCH 32.6 26.5 - 33.0 pg    MCHC 33.2 31.5 - 36.5 g/dL    RDW 18.3 (H) 10.0 - 15.0 %    Platelet Count 63 (L) 150 - 450 10e3/uL    % Neutrophils 63 %    % Lymphocytes 17 %    % Monocytes 11 %    % Eosinophils 8 %    % Basophils 1 %    % Immature Granulocytes 0 %    NRBCs per 100 WBC 0 <1 /100    Absolute Neutrophils 3.1 1.6 - 8.3 10e3/uL    Absolute Lymphocytes 0.9 0.8 - 5.3 10e3/uL    Absolute Monocytes 0.5 0.0 - 1.3 10e3/uL    Absolute Eosinophils 0.4 0.0 - 0.7 10e3/uL    Absolute Basophils 0.0 0.0 - 0.2 10e3/uL    Absolute Immature Granulocytes 0.0 <=0.4 10e3/uL    Absolute NRBCs 0.0 10e3/uL   US Abdomen Complete with TIPSS Doppler    Narrative    EXAMINATION: US ABDOMEN COMPLETE WITH TIPSS DOPPLER 2/2/2022 4:52 PM     COMPARISON: 6/23/2021    HISTORY: Right upper quadrant pain, history of TIPS    TECHNIQUE: The abdomen was scanned in standard fashion with  specialized ultrasound transducer(s) using both gray-scale, color  Doppler, and spectral flow techniques.    Findings:  Liver: Nodular hyperechoic hepatic parenchyma with nodular surface  contour. Liver measures 14.9 cm in craniocaudal dimension. No focal  masses appreciated. Changes of TIPS.    There is flow towards the liver in the main portal vein:  37 cm/sec in the main portal vein  Retrograde at 15 cm/sec in the right portal vein  Retrograde  at 17 cm/sec in the left portal vein    The stent velocities are  131 cm/sec at the portal  vein  174 cm/sec in mid stent  147 cm/sec in the right hepatic vein distal shunt end.     Flow in the hepatic artery is towards the liver and:  108 cm/s peak systolic  0.78 resistive index.     The splenic vein is patent and flow is towards the liver.  The left,  middle, and right hepatic veins are patent with flow towards the IVC.  The IVC is patent with flow towards the heart.   The visualized aorta  is not dilated.    Gallbladder: There is no wall thickening, pericholecystic fluid,  positive sonographic Zimmer's sign. Large shadowing gallstone in the  gallbladder neck, as well as layering biliary sludge.    Bile Ducts: Both the intra- and extrahepatic biliary system are of  normal caliber.  The common bile duct measures 2.5 mm.    Pancreas: Visualized portions of the head and body of the pancreas are  unremarkable.     Kidneys: Both kidneys are of normal echotexture, without mass or  hydronephrosis.   Renal lengths: right- 10.5 cm, left- 10.9 cm. Large  shadowing stone in the inferior pole right kidney measuring up to 1.2  cm, as well as an inferior pole left kidney shadowing stone measuring  up to 2.4 cm.    Spleen: The spleen is enlarged measuring 16.9 cm in length.    Fluid: Large volume ascites seen in all 4 quadrants.        Impression    Impression:   1.  Cirrhotic morphology of the liver with post surgical changes of  TIPS, with patency of all vessels demonstrated.  2.  Cholelithiasis without cholecystitis.  3.  Large nonobstructing shadowing stones in both kidneys as detailed.    I have personally reviewed the examination and initial interpretation  and I agree with the findings.    CLEOPATRA WILDER MD         SYSTEM ID:  [id number]   CT Abdomen Pelvis w Contrast    Narrative    EXAMINATION: CT ABDOMEN PELVIS W CONTRAST, 2/2/2022 6:26 PM    INDICATION: Severe right upper quadrant/epigastric pain, recent  paracentesis with TIPS revision    COMPARISON STUDY: Ultrasound 2/2/2022    TECHNIQUE: CT scan of the  abdomen and pelvis was performed on  multidetector CT scanner using volumetric acquisition technique and  images were reconstructed in multiple planes with variable thickness  and reviewed on dedicated workstations.     CONTRAST: iopamidol (ISOVUE-370) solution 101 mL injected IV with oral  contrast    CT scan radiation dose is optimized to minimum requisite dose using  automated dose modulation techniques.    FINDINGS:    Lower thorax: Bibasilar atelectasis. No focal infectious  consolidation. Partially visualized heart is mildly enlarged.  Partially visualized gastroesophageal and paraesophageal varices.    Liver: Cirrhotic configuration of the liver. No intrahepatic biliary  dilation. TIPS in place during the hepatic vein and portal vein and  the suprahepatic IVC. TIPS appears patent on both the CT and the  comparison ultrasound.    Biliary System: The gallbladder is distended with multiple with large  stones at the distal body and neck of the gallbladder. Layering  hyperdense sludge versus vicarious excretion of contrast. No  gallbladder wall thickening. No pericholecystic fluid collection.    Pancreas: No mass or pancreatic ductal dilation.    Adrenal glands: No mass or nodules    Spleen: Enlarged. Punctate calcified granulomas.    Kidneys: Multiple intrarenal calculi without calyceal dilatation. No  obstructing urinary calculi. Symmetric enhancement kidneys.  Calcification along the right ureter in the pelvis consistent with a  nonobstructing stone. This measures 5 mm on axial imaging, series 3  image 399 and 6 mm on coronal imaging, series 4 image 65.    Gastrointestinal tract : Appendix is unremarkable. There are prominent  gas-filled loops of colon involving the descending and sigmoid colon  measuring up to 4.1 cm at the distal sigmoid. Additional borderline  loops of small bowel in the anterior mid to upper abdomen, likely  proximal to mid ileum, for example within the upper abdomen measuring  up to 2.8 cm  on series 3 image 214). There is no definite focal  transition point. No pneumatosis or pneumoperitoneum. No portal venous  gas.    Mesentery/peritoneum/retroperitoneum: There is a moderate volume of  simple appearing ascites which extends to the right inguinal canal. No  loculated fluid collection.    Lymph nodes: No significant lymphadenopathy.    Vasculature: Gastroesophageal varices. Splenorenal collaterals. Patent  major intra-abdominal arterial vasculature and TIPS appears patent.  The branch portal veins are not opacified and presumably thrombosed.    Pelvis: Urinary bladder wall is normal.  Layering bladder stone  (series 3 image 419), however away from the expected locations of the  ureterovesical junctions.    Osseous structures: No aggressive or acute osseous lesion.      Soft tissues: Within normal limits.      Impression    IMPRESSION:   1. Cirrhotic configuration of the liver with moderate abdominal  ascites.  2. Additional evidence of portal venous hypertension including  splenomegaly and gastric esophageal varices, and splenorenal shunting.  3. Prominent loops of colon and small bowel without definable  transition point. Findings could be reactive to ascites versus  developing adynamic ileus. Developing small or large bowel obstruction  is thought to be less likely.  4. Cholelithiasis without cholecystitis.  5. Bilateral nonobstructing renal calculi. There is also a  nonobstructing stone in the right ureter and dependent stone in the  bladder suspected. Correlation with urinalysis and patient's symptoms  suggested.    I have personally reviewed the examination and initial interpretation  and I agree with the findings.    CLEOPATRA WILDER MD         SYSTEM ID:  [id number]     *Note: Due to a large number of results and/or encounters for the requested time period, some results have not been displayed. A complete set of results can be found in Results Review.     Medications   morphine (PF) injection 2  mg (2 mg Intravenous Given 2/2/22 1757)   iopamidol (ISOVUE-370) solution 101 mL (101 mLs Intravenous Given 2/2/22 1802)   sodium chloride (PF) 0.9% PF flush 65 mL (65 mLs Intravenous Given 2/2/22 1803)        Assessments & Plan (with Medical Decision Making)   Patient presents to the ED for evaluation of abdominal pain.  Had a TIPS procedure and paracentesis on Friday.  Pain located in the right upper quadrant, R flank.  Patient also notes that the pain started while he was lifting a heavy tire yesterday.    Differential diagnosis includes ascites, TIPS failure, thrombosis, cholecystitis, pancreatitis, ureterolithiasis, abdominal muscle strain, SBP    On arrival, patient has normal vital signs.  Is afebrile.  He appears nontoxic.  He is uncomfortable due to the pain has tenderness in the midepigastric area.  Negative Zimmer sign.  Abdomen is otherwise soft with a mild fluid wave.  Nonperitonitic    TIPS ultrasound shows patent TIPS vessels without any other complications.  Subsequent CT shows moderate ascites, cholelithiasis without cholecystitis    Bilirubin is consistent with baseline.  Patient without Zimmer sign, doubt biliary colic or atypical cholecystitis    Very low suspicion for SBP given afebrile, no leukocytosis, overall well-appearing.    Case/imaging discussed with the interventional radiology team given the recent procedure.  Does not appear to be any complications at this time.  No additional work-up from their standpoint.    Given the negative work-up, patient discharged with pain medicine.  Etiology of pain unclear, could be musculoskeletal in origin given onset after lifting heavy tire.      Update: Preliminary CT read did not note ureteral stone.  However, on final review, there is a nonobstructing stone in the right ureter as well as dependent stone in the bladder.  Very likely the cause of patient's pain.  Unfortunately, pt had already been dischareged  I called the patient at home and  discussed the results with him.  I recommended a urinalysis as soon as possible to rule out infection.  Additionally, patient should be provided with Flomax, urine strainer, urology follow-up.  Patient states that he will call his primary care physician tomorrow to help arrange this and if he is unable he will return to the ED.      I have reviewed the nursing notes.    I have reviewed the findings, diagnosis, plan and need for follow up with the patient.    Discharge Medication List as of 2/2/2022  7:39 PM      START taking these medications    Details   oxyCODONE (ROXICODONE) 5 MG tablet Take 1 tablet (5 mg) by mouth every 6 hours as needed for pain, Disp-12 tablet, R-0, Local Print             Final diagnoses:   Abdominal pain, generalized       IMin am serving as a trained medical scribe to document services personally performed by Kevin Krishna DO, based on the provider's statements to me.      IKevin DO, was physically present and have reviewed and verified the accuracy of this note documented by Min Krishnan.     Kevin Krishna DO  2/2/2022   [facility] EMERGENCY DEPARTMENT     Kevin Krishna DO  02/02/22 [id number]

## 2022-02-02 NOTE — ED TRIAGE NOTES
Abdominal and flank pain starting Sunday continuing today. Reports fluid removal from abdomen last Friday, nausea starting on the way to the ER. Denies vomiting, additional symptoms

## 2022-02-02 NOTE — ED NOTES
ED Triage Provider Note  [facility]  Encounter Date: Feb 2, 2022    History:  Chief Complaint   Patient presents with     Abdominal Pain     Ivan Bell is a 56 year old male past medical history significant for alcoholic cirrhosis with recurrent ascites, recent TIPS revision 1/28/2022 with 3700 mL paracentesis 1/28/2022 who presents to the ED with upper abdominal pain over the last 2 days.  Patient states that since Sunday has been doing with upper abdominal pain, as well as pain localized to the right upper quadrant.  Pain is constant character.  Has been keeping him up at night.  This is without any associated emesis or diarrhea, does report some nausea associated.  No fevers.  Denies any chest pain, reports that the pain does give him some shortness of breath.  He states that this is similar to when he has had ascites, although is more painful.  He has been taking Tylenol which does not help with his pain.    Review of Systems:  He denies any fevers, chest pain    Exam:  BP (!) 151/72   Temp 97.9  F (36.6  C) (Tympanic)   Resp 16   Wt 74.8 kg (165 lb)   SpO2 100%   BMI 23.68 kg/m    General: No acute distress. Appears stated age.   Cardio: Regular rate, extremities well perfused  Resp: Normal work of breathing, grossly normal respiratory rate  Abdominal: Question abdominal distention as patient is sitting in a chair, does have focal tenderness to palpation of the right upper quadrant as well as the epigastrium.  I do not appreciate significant tenderness to palpation of the lower abdomen  Neuro: Alert.    Medical Decision Making:  Patient arriving to the ED with problem as above. A medical screening exam was performed.  CBC CMP lipase INR PTT abdominal ultrasound, IV placement, morphine orders initiated from Triage.  Morphine to be given once patient has a bed.  The patient is appropriate to wait in triage.      Talia Bowen PA-C on 2/2/2022 at 3:09  PM     Talia Bowen PA-C  02/02/22 [id number]

## 2022-02-03 ENCOUNTER — OFFICE VISIT (OUTPATIENT)
Dept: FAMILY MEDICINE | Facility: CLINIC | Age: 57
End: 2022-02-03
Payer: COMMERCIAL

## 2022-02-03 VITALS
DIASTOLIC BLOOD PRESSURE: 56 MMHG | SYSTOLIC BLOOD PRESSURE: 118 MMHG | TEMPERATURE: 99.2 F | BODY MASS INDEX: 25.28 KG/M2 | RESPIRATION RATE: 20 BRPM | WEIGHT: 176.2 LBS | HEART RATE: 96 BPM

## 2022-02-03 DIAGNOSIS — R10.84 GENERALIZED ABDOMINAL PAIN: Primary | ICD-10-CM

## 2022-02-03 DIAGNOSIS — N20.1 RIGHT URETERAL STONE: ICD-10-CM

## 2022-02-03 DIAGNOSIS — N30.01 ACUTE CYSTITIS WITH HEMATURIA: ICD-10-CM

## 2022-02-03 DIAGNOSIS — K80.20 GALLSTONES: ICD-10-CM

## 2022-02-03 LAB
ALBUMIN UR-MCNC: NEGATIVE MG/DL
APPEARANCE UR: CLEAR
BACTERIA #/AREA URNS HPF: ABNORMAL /HPF
BILIRUB UR QL STRIP: NEGATIVE
COLOR UR AUTO: YELLOW
CREAT SERPL-MCNC: 1.25 MG/DL (ref 0.66–1.25)
GFR SERPL CREATININE-BSD FRML MDRD: 67 ML/MIN/1.73M2
GLUCOSE UR STRIP-MCNC: NEGATIVE MG/DL
HGB UR QL STRIP: ABNORMAL
KETONES UR STRIP-MCNC: NEGATIVE MG/DL
LEUKOCYTE ESTERASE UR QL STRIP: ABNORMAL
NITRATE UR QL: NEGATIVE
PH UR STRIP: 6.5 [PH] (ref 5–7)
RBC #/AREA URNS AUTO: ABNORMAL /HPF
SP GR UR STRIP: 1.01 (ref 1–1.03)
SQUAMOUS #/AREA URNS AUTO: ABNORMAL /LPF
UROBILINOGEN UR STRIP-ACNC: 1 E.U./DL
WBC #/AREA URNS AUTO: ABNORMAL /HPF
WBC CLUMPS #/AREA URNS HPF: PRESENT /HPF

## 2022-02-03 PROCEDURE — 99214 OFFICE O/P EST MOD 30 MIN: CPT | Performed by: NURSE PRACTITIONER

## 2022-02-03 PROCEDURE — 36415 COLL VENOUS BLD VENIPUNCTURE: CPT | Performed by: NURSE PRACTITIONER

## 2022-02-03 PROCEDURE — 99000 SPECIMEN HANDLING OFFICE-LAB: CPT | Performed by: NURSE PRACTITIONER

## 2022-02-03 PROCEDURE — 82565 ASSAY OF CREATININE: CPT | Performed by: NURSE PRACTITIONER

## 2022-02-03 PROCEDURE — 81001 URINALYSIS AUTO W/SCOPE: CPT | Performed by: NURSE PRACTITIONER

## 2022-02-03 PROCEDURE — 87086 URINE CULTURE/COLONY COUNT: CPT | Performed by: NURSE PRACTITIONER

## 2022-02-03 PROCEDURE — 82365 CALCULUS SPECTROSCOPY: CPT | Mod: 90 | Performed by: NURSE PRACTITIONER

## 2022-02-03 RX ORDER — ONDANSETRON 4 MG/1
4 TABLET, ORALLY DISINTEGRATING ORAL EVERY 8 HOURS PRN
Qty: 30 TABLET | Refills: 0 | Status: SHIPPED | OUTPATIENT
Start: 2022-02-03 | End: 2022-10-12

## 2022-02-03 RX ORDER — CIPROFLOXACIN 500 MG/1
500 TABLET, FILM COATED ORAL 2 TIMES DAILY
Qty: 14 TABLET | Refills: 0 | Status: SHIPPED | OUTPATIENT
Start: 2022-02-03 | End: 2022-03-17

## 2022-02-03 RX ORDER — TAMSULOSIN HYDROCHLORIDE 0.4 MG/1
0.4 CAPSULE ORAL DAILY
Qty: 7 CAPSULE | Refills: 1 | Status: ON HOLD | OUTPATIENT
Start: 2022-02-03 | End: 2022-07-01

## 2022-02-03 ASSESSMENT — ENCOUNTER SYMPTOMS
COUGH: 0
EYE DISCHARGE: 0
FLANK PAIN: 1
ABDOMINAL PAIN: 1
FATIGUE: 0
SINUS PRESSURE: 0
FEVER: 0
SHORTNESS OF BREATH: 0
NAUSEA: 1
DIAPHORESIS: 0
HEADACHES: 0
RHINORRHEA: 0
DIARRHEA: 0
WHEEZING: 0
SORE THROAT: 0
VOMITING: 0

## 2022-02-03 ASSESSMENT — PAIN SCALES - GENERAL: PAINLEVEL: EXTREME PAIN (8)

## 2022-02-03 NOTE — PATIENT INSTRUCTIONS
Please push fluids to flush out the kidney stone.    If you develop any worsening nausea, vomiting, fever, chills, worsening abdominal pain, inability to have a bowel movement or pass gas please be reevaluated in the emergency department.    Use over-the-counter Tylenol and ibuprofen very sparingly.    You may take 1-2 of the oxycodone as needed to help control the pain. However, this can decrease the motility in your intestines so please use it cautiously.    Prescription sent for Cipro to the pharmacy. This is an antibiotic to treat your urinary tract infection.    Please follow-up with urology within the next 2 weeks.    I did place an order for you to follow-up with general surgery as well. You may push this appointment out a few months.

## 2022-02-03 NOTE — DISCHARGE INSTRUCTIONS
The exact cause of your abdominal pain is not clear at this time.  Your work-up in the emergency department including ultrasound, CT, labs, did not reveal any significant medical problems.  There does not appear to be any complications from your recent procedure.  No evidence of infection in the abdomen.  Musculoskeletal pain such as an abdominal muscle strain is a possibility.  You have been discharged with a short course of pain medications.  Make sure to follow-up with your usual doctors within 3 to 5 days for reassessment of your symptoms.  If you develop any fevers, chills, nausea/vomiting, worsening abdominal pain, would like you to return to the emergency department immediately.

## 2022-02-03 NOTE — PROGRESS NOTES
Assessment & Plan     Abdominal pain  Emergency room notes and imaging reviewed.  Education given regarding possible ileus.  Educated regarding symptoms to watch for.  - UA reflex to Microscopic and Culture; Future  - UA reflex to Microscopic and Culture  - Urine Microscopic Exam  - Urine Culture    Right ureteral stone  Encouraged to push fluids.  Prescription for Flomax.  May increase oxycodone to 1 to 2 tablets as needed for discomfort.  Will refill x1 if needed.  Encouraged to use acetaminophen or ibuprofen very sparingly-avoid if possible.  We will recheck kidney function here today.  Will refer to urology.  Unfortunately, patient left clinic prior to receiving urine strainer  - tamsulosin (FLOMAX) 0.4 MG capsule; Take 1 capsule (0.4 mg) by mouth daily  - Adult Urology Referral; Future  - Stone analysis; Future  - Creatinine; Future  - ondansetron (ZOFRAN-ODT) 4 MG ODT tab; Take 1 tablet (4 mg) by mouth every 8 hours as needed for nausea  - Creatinine  - Stone analysis    Gallstones  Stable at present.  Will arrange for surgery consultation in the future.  Educated regarding warning signs to watch for.  - Adult General Surg Referral; Future    Acute cystitis with hematuria  Patient instructed to return for fever, vomiting, rash, flank/back pain or if symptoms not resolved in 2 days  Will send urine for C&S  - Creatinine; Future  - ciprofloxacin (CIPRO) 500 MG tablet; Take 1 tablet (500 mg) by mouth 2 times daily.  Renal and hepatic dosing utilized.    Review of external notes as documented elsewhere in note  Review of the result(s) of each unique test - Labs, imaging  Ordering of each unique test  Prescription drug management  42 minutes spent on the date of the encounter doing chart review, history and exam, documentation and further activities per the note       No follow-ups on file.    GILLIAN [facility] [place] is a 56 year old who presents for the  following health issues     HPI     ED/UC Followup:    Facility:  [facility] Emergency Department  Date of visit: 2/2/22   Reason for visit: Abdominal pain, generalized  Current Status: still in a lot of pain         Here today for emergency room follow-up.  Was seen yesterday at the [facility] for abdominal pain.  Had CT of abdomen that showed prominent bowel loops without definable transition point.  May be reactive ascites versus developing ileus, cholelithiasis without cholecystitis, bilateral renal stones with nonobstructing stone in the right ureter.  Unfortunately, patient was discharged home prior to final read of CT.  Presents today for further treatment of renal stone.  Was discharged yesterday with oxycodone for pain management.  Reports has not been able to sleep well due to severity of pain.  History of cirrhosis, altered liver and renal function.  Has appointment tomorrow for paracentesis.  Today abdominal pain is about the same as yesterday.  No fevers or chills.  Occasional nausea.  No vomiting.  Bowels have been moving okay, passing gas.  Has been occasionally taking Tylenol or ibuprofen in addition to oxycodone to help control pain.    Review of Systems   Constitutional: Negative for diaphoresis, fatigue and fever.   HENT: Negative for congestion, ear pain, rhinorrhea, sinus pressure and sore throat.    Eyes: Negative for discharge.   Respiratory: Negative for cough, shortness of breath and wheezing.    Cardiovascular: Negative for chest pain.   Gastrointestinal: Positive for abdominal pain and nausea. Negative for diarrhea and vomiting.   Genitourinary: Positive for flank pain.   Neurological: Negative for headaches.          Objective    /56 (BP Location: Left arm, Patient Position: Sitting, Cuff Size: Adult Regular)   Pulse 96   Temp 99.2  F (37.3  C) (Tympanic)   Resp 20   Wt 79.9 kg (176 lb 3.2 oz)   BMI 25.28 kg/m    Body mass index is 25.28  kg/m .  Physical Exam  Constitutional:       Appearance: He is well-developed.      Comments: Mild distress     HENT:      Head: Normocephalic and atraumatic.      Right Ear: Tympanic membrane and external ear normal. No middle ear effusion. Tympanic membrane is not erythematous.      Left Ear: Tympanic membrane and external ear normal.  No middle ear effusion. Tympanic membrane is not erythematous.      Nose: No mucosal edema or rhinorrhea.   Cardiovascular:      Rate and Rhythm: Normal rate and regular rhythm.      Heart sounds: Normal heart sounds.   Pulmonary:      Effort: Pulmonary effort is normal.      Breath sounds: Normal breath sounds. No wheezing.   Abdominal:      General: Bowel sounds are normal.      Palpations: Abdomen is soft.      Tenderness: There is generalized abdominal tenderness. There is right CVA tenderness and guarding. There is no left CVA tenderness or rebound.      Hernia: A hernia is present. Hernia is present in the umbilical area.   Skin:     General: Skin is warm and dry.   Neurological:      Mental Status: He is alert.

## 2022-02-04 ENCOUNTER — APPOINTMENT (OUTPATIENT)
Dept: LAB | Facility: CLINIC | Age: 57
End: 2022-02-04
Payer: COMMERCIAL

## 2022-02-04 LAB — BACTERIA UR CULT: NORMAL

## 2022-02-07 ENCOUNTER — TELEPHONE (OUTPATIENT)
Dept: SURGERY | Facility: CLINIC | Age: 57
End: 2022-02-07

## 2022-02-07 ENCOUNTER — OFFICE VISIT (OUTPATIENT)
Dept: SURGERY | Facility: CLINIC | Age: 57
End: 2022-02-07
Attending: NURSE PRACTITIONER
Payer: COMMERCIAL

## 2022-02-07 VITALS — HEIGHT: 70 IN | BODY MASS INDEX: 23.62 KG/M2 | WEIGHT: 165 LBS

## 2022-02-07 DIAGNOSIS — R10.12 LUQ PAIN: Primary | ICD-10-CM

## 2022-02-07 DIAGNOSIS — K80.20 GALLSTONES: ICD-10-CM

## 2022-02-07 PROCEDURE — 99214 OFFICE O/P EST MOD 30 MIN: CPT | Performed by: SURGERY

## 2022-02-07 RX ORDER — OMEPRAZOLE 20 MG/1
20 TABLET, DELAYED RELEASE ORAL DAILY
Qty: 30 TABLET | Refills: 3 | Status: SHIPPED | OUTPATIENT
Start: 2022-02-07 | End: 2022-03-09

## 2022-02-07 ASSESSMENT — PAIN SCALES - GENERAL: PAINLEVEL: SEVERE PAIN (6)

## 2022-02-07 ASSESSMENT — MIFFLIN-ST. JEOR: SCORE: 1574.69

## 2022-02-07 NOTE — TELEPHONE ENCOUNTER
2/7 Provided phone number [phone] to schedule endoscopy.     Luzma wheeler Procedure   Orthopedics, Podiatry, Sports Medicine, ENT/Eye Specialties  [facility]   [phone]

## 2022-02-07 NOTE — NURSING NOTE
"Ivan Bell's chief complaint for this visit includes:  Chief Complaint   Patient presents with     Consult     gallstones     PCP: [place]    Referring Provider:  GILLIAN Lou CNP  [id number] [facility] [place] [address]    Ht 1.778 m (5' 10\")   Wt 74.8 kg (165 lb)   BMI 23.68 kg/m    Severe Pain (6)     Do you need any medication refills at today's visit? No    Cheryl Elkins CMA        "

## 2022-02-07 NOTE — PROGRESS NOTES
Chief Complaint: Abdominal pain    History of Present Illness:   56 year old man sent in consultation by Jenifer FRENCH for evaluation of abdominal pain. He does have a history significant for liver cirrhosis, s/p esophageal variceal banding and TIPS. He first noted the abdonimal pain last Wednesday, he describes it as bieng located in the entire abdomen and was so severe that he could barely stand up. It was a sharp pain with any radiation.  It was severe enough that he had some associated nausea, but no vomiting. He denies any specific radiation of symptoms.  He denies any instigating activity. He also denies any aggravating or alleviating factors.  He did eat regularly during this time period, perhaps with some mild aggravation of the symptoms. He took some tylenol, which did not help much. In retrospect he does state  that this pain may have first been mildly present 1 week ago Friday after revision of his TIPS.  He went to the ED where US and CT scan were performed, and he was given some oxycontin, which was helpful. He noticed recurrence of the pain 2 days ago- again severe, but noted it to be worst on the left side. He denies any pain yesterday, but again notes some mild pain, in the LUQ today, which he states feels like a gas bubble. He has not taken any narcotics since Saturday. He is eating normally.   He denies any fevers.             Past Medical History:   Diagnosis Date     Alcoholic cirrhosis of liver (H)      Alcoholic hepatitis 03/2019     Chronic kidney disease     CRF     Depression      Gout      History of transfusion      Hypertension      Hypertension      Hypertriglyceridemia      Left calcaneus fracture 01/09/2006 January 16, 2006: Fell 10 feet from ladder onto left foot on frozen ground on 1/9/06 at home.  Immediate pain and unable to walk- seen at [place] and diagnosed with calcaneus fracture     Nephrolithiasis      Osteoarthritis      Portal vein thrombosis     left occlusion,  partial main     Past Surgical History:   Procedure Laterality Date     ANKLE SURGERY Left      ANKLE SURGERY       ARTHROSCOPY KNEE       ARTHROSCOPY SHOULDER       ARTHROSCOPY SHOULDER ROTATOR CUFF REPAIR       COLONOSCOPY N/A 03/31/2016    Procedure: COLONOSCOPY;  Surgeon: Rhys Uriostegui MD;  Location:  GI     ESOPHAGOSCOPY, GASTROSCOPY, DUODENOSCOPY (EGD), COMBINED N/A 03/31/2016    Procedure: COMBINED ESOPHAGOSCOPY, GASTROSCOPY, DUODENOSCOPY (EGD);  Surgeon: Rhys Uriostegui MD;  Location:  GI     ESOPHAGOSCOPY, GASTROSCOPY, DUODENOSCOPY (EGD), COMBINED N/A 03/09/2018    Procedure: COMBINED ESOPHAGOSCOPY, GASTROSCOPY, DUODENOSCOPY (EGD), BIOPSY SINGLE OR MULTIPLE;  EGD;  Surgeon: Gonzalo Wahl MD;  Location:  GI     ESOPHAGOSCOPY, GASTROSCOPY, DUODENOSCOPY (EGD), COMBINED N/A 06/07/2019    Procedure: ESOPHAGOGASTRODUODENOSCOPY (EGD);  Surgeon: Gonzalo Wahl MD;  Location:  GI     FOOT SURGERY       HIP SURGERY       IR PARACENTESIS  10/29/2019     IR PARACENTESIS  11/25/2020     IR PARACENTESIS  8/11/2021     IR PARACENTESIS  1/28/2022     IR TRANSVEN INTRAHEPATIC PORTOSYST REV  10/29/2019     IR TRANSVEN INTRAHEPATIC PORTOSYST REV  11/25/2020     IR TRANSVEN INTRAHEPATIC PORTOSYST REV  8/11/2021     IR TRANSVEN INTRAHEPATIC PORTOSYST REV  1/28/2022     KNEE SURGERY Left      KNEE SURGERY Right      RELEASE CARPAL TUNNEL       RELEASE TRIGGER FINGER Right 06/11/2020    Procedure: RELEASE, TRIGGER FINGER, right ring and long finger;  Surgeon: Pascual Valencia MD;  Location: [facility] OR     SIGMOIDOSCOPY FLEXIBLE N/A 10/31/2017    Procedure: SIGMOIDOSCOPY FLEXIBLE;;  Surgeon: Armaan Adams MD;  Location:  GI     TIPS Procedure  06/06/2018     TIPS PROCEDURE  11/01/2020     ZZC PLASTY KNEE,MED OR LAT COMPARTMT Right 02/19/2021    Procedure: RIGHT UNICOMPARTMENTAL ARTHROPLASTY KNEE MEDIAL;  Surgeon: José Miguel Landaverde MD;  Location: [facility] OR;   Service: Orthopedics     Current Outpatient Medications   Medication     Ascorbic Acid (VITAMIN C PO)     ciprofloxacin (CIPRO) 500 MG tablet     eplerenone (INSPRA) 50 MG tablet     fexofenadine (ALLEGRA) 60 MG tablet     furosemide (LASIX) 20 MG tablet     lactulose (CHRONULAC) 10 GM/15ML solution     Menaquinone-7 (VITAMIN K2) 100 MCG CAPS     MULTIPLE VITAMINS PO     ondansetron (ZOFRAN-ODT) 4 MG ODT tab     potassium chloride ER (KLOR-CON M) 20 MEQ CR tablet     rifaximin (XIFAXAN) 550 MG TABS tablet     sertraline (ZOLOFT) 50 MG tablet     sildenafil (REVATIO) 20 MG tablet     sodium bicarbonate 650 MG tablet     tamsulosin (FLOMAX) 0.4 MG capsule     vitamin D3 (CHOLECALCIFEROL) 2000 units (50 mcg) tablet     acetaminophen (TYLENOL) 500 MG tablet     furosemide (LASIX) 40 MG tablet     No current facility-administered medications for this visit.        Allergies   Allergen Reactions     Prednisone Visual Disturbance     Trazodone Visual Disturbance     Benadryl [Diphenhydramine] Other (See Comments)     Delirium (visual and auditory hallucinations)     Social History     Socioeconomic History     Marital status:      Spouse name: Not on file     Number of children: Not on file     Years of education: Not on file     Highest education level: Not on file   Occupational History     Not on file   Tobacco Use     Smoking status: Former Smoker     Packs/day: 0.00     Types: Dip, chew, snus or snuff     Quit date: 1998     Years since quittin.4     Smokeless tobacco: Former User     Types: Chew     Tobacco comment: 1 tin per 10 days.   Vaping Use     Vaping Use: Never used   Substance and Sexual Activity     Alcohol use: No     Alcohol/week: 17.5 standard drinks     Types: 21 Cans of beer per week     Comment: last etoh 14, did have Odouls ~2017     Drug use: No     Sexual activity: Not Currently   Other Topics Concern     Parent/sibling w/ CABG, MI or angioplasty before 65F 55M? No   Social  "History Narrative     Not on file     Social Determinants of Health     Financial Resource Strain: Not on file   Food Insecurity: Not on file   Transportation Needs: Not on file   Physical Activity: Not on file   Stress: Not on file   Social Connections: Not on file   Intimate Partner Violence: Not on file   Housing Stability: Not on file     Family History   Problem Relation Age of Onset     Family History Negative Mother      Family History Negative Father      Hypertension Father      Cerebrovascular Disease Father 87     Breast Cancer Maternal Grandmother      Rheumatoid Arthritis Daughter      Depression Daughter      Substance Abuse Brother      Cancer - colorectal No family hx of      Prostate Cancer No family hx of      Liver Disease No family hx of            Review of Systems:  No chest pain, dyspnea, weight loss, fevers or night sweats.   All other systems questioned and negative.     Exam:  Vital signs for exam: Ht 1.778 m (5' 10\")   Wt 74.8 kg (165 lb)   BMI 23.68 kg/m    Constitutional: healthy, alert and no distress.  Head: Normocephalic. No masses, lesions, tenderness or abnormalities.  ENT: ENT exam normal, no neck nodes or sinus tenderness.  Cardiovascular: Normal S1, S2, no murmurs  Respiratory: Clear to auscultation.   Gastrointestinal:  Abdomen soft, mild tenderness to palpation in LUQ, reducible no tender umbilical hernia  : Deferred  Musculoskeletal: extremities normal- no gross deformities noted and normal muscle tone  Skin: no jaundice, rashes or petechiae.   Neurologic: Gait normal.  Psychiatric: Mentation appears normal and affect normal.    Laboratory Studies:    Latest CBC:  Lab Results   Component Value Date    WBC 5.0 02/02/2022    WBC 4.3 06/23/2021     Lab Results   Component Value Date    RBC 3.34 02/02/2022    RBC 3.78 06/23/2021     Lab Results   Component Value Date    HGB 10.9 02/02/2022    HGB 12.1 06/23/2021     Lab Results   Component Value Date    HCT 32.8 02/02/2022    " HCT 37.3 06/23/2021     Lab Results   Component Value Date    MCV 98 02/02/2022    MCV 99 06/23/2021     Lab Results   Component Value Date    MCH 32.6 02/02/2022    MCH 32.0 06/23/2021     Lab Results   Component Value Date    MCHC 33.2 02/02/2022    MCHC 32.4 06/23/2021     Lab Results   Component Value Date    RDW 18.3 02/02/2022    RDW 18.6 06/23/2021     Lab Results   Component Value Date    PLT 63 02/02/2022    PLT 58 06/23/2021       Latest Basic Metabolic Panel:  Lab Results   Component Value Date     02/02/2022     06/23/2021      Lab Results   Component Value Date    POTASSIUM 3.8 02/02/2022    POTASSIUM 4.0 06/23/2021     Lab Results   Component Value Date    CHLORIDE 112 02/02/2022    CHLORIDE 117 06/23/2021     Lab Results   Component Value Date    JULISSA 8.5 02/02/2022    JULISSA 8.5 06/23/2021     Lab Results   Component Value Date    CO2 23 02/02/2022    CO2 25 06/23/2021     Lab Results   Component Value Date    BUN 18 02/02/2022    BUN 8 06/23/2021     Lab Results   Component Value Date    CR 1.25 02/03/2022    CR 1.09 06/23/2021     Lab Results   Component Value Date    GLC 95 02/02/2022    GLC 77 06/23/2021       Latest Liver Studies:  Lab Results   Component Value Date    PROTTOTAL 5.7 02/02/2022    PROTTOTAL 5.6 06/23/2021     Lab Results   Component Value Date    ALBUMIN 2.2 02/02/2022    ALBUMIN 2.7 06/23/2021     Lab Results   Component Value Date    BILITOTAL 2.3 02/02/2022    BILITOTAL 3.6 06/23/2021     Lab Results   Component Value Date    ALKPHOS 146 02/02/2022    ALKPHOS 165 06/23/2021     Lab Results   Component Value Date    AST 37 02/02/2022    AST 33 06/23/2021     Lab Results   Component Value Date    ALT 20 02/02/2022    ALT 19 06/23/2021         Latest Lipase and Amylase:  Lab Results   Component Value Date    LIPASE 327 02/02/2022    LIPASE 281 03/26/2021       Latest Coagulation Studies:  No results found for: PT  Lab Results   Component Value Date    PTT 41 02/02/2022     PTT 44 10/29/2019     Lab Results   Component Value Date    INR 1.65 02/02/2022    INR 1.79 06/23/2021       Radiology:   US: Gallbladder: There is no wall thickening, pericholecystic fluid,  positive sonographic Zimmer's sign. Large shadowing gallstone in the  gallbladder neck, as well as layering biliary sludge    CT: Cirrhosis, portal hypertension, cholelithiasis, nephrolithiasis without obstruction      IMPRESSION AND PLAN:  56 year old man with cirrhosis, portal hypertension, status post TIPS revision with abdominal pain, and cholelithiasis. His pain is on the left side currently, and unlikely to be due to cholelithiasis. We discussed symptoms of gallbladder disease.   The pain could be of gastric origin. I gave him a prescription of PPIs and ordered  an upper endoscopy as his last once seems to have been in 2019.

## 2022-02-07 NOTE — LETTER
2/7/2022         RE: Ivan Bell  [address] [facility] [address]        Dear Colleague,    Thank you for referring your patient, Ivan Bell, to the [facility]. Please see a copy of my visit note below.    Chief Complaint: Abdominal pain    History of Present Illness:   56 year old man sent in consultation by Jenifer FRENCH for evaluation of abdominal pain. He does have a history significant for liver cirrhosis, s/p esophageal variceal banding and TIPS. He first noted the abdonimal pain last Wednesday, he describes it as bieng located in the entire abdomen and was so severe that he could barely stand up. It was a sharp pain with any radiation.  It was severe enough that he had some associated nausea, but no vomiting. He denies any specific radiation of symptoms.  He denies any instigating activity. He also denies any aggravating or alleviating factors.  He did eat regularly during this time period, perhaps with some mild aggravation of the symptoms. He took some tylenol, which did not help much. In retrospect he does state  that this pain may have first been mildly present 1 week ago Friday after revision of his TIPS.  He went to the ED where US and CT scan were performed, and he was given some oxycontin, which was helpful. He noticed recurrence of the pain 2 days ago- again severe, but noted it to be worst on the left side. He denies any pain yesterday, but again notes some mild pain, in the LUQ today, which he states feels like a gas bubble. He has not taken any narcotics since Saturday. He is eating normally.   He denies any fevers.             Past Medical History:   Diagnosis Date     Alcoholic cirrhosis of liver (H)      Alcoholic hepatitis 03/2019     Chronic kidney disease     CRF     Depression      Gout      History of transfusion      Hypertension      Hypertension      Hypertriglyceridemia      Left calcaneus fracture 01/09/2006 January 16, 2006: Fell  10 feet from ladder onto left foot on frozen ground on 1/9/06 at home.  Immediate pain and unable to walk- seen at [place] and diagnosed with calcaneus fracture     Nephrolithiasis      Osteoarthritis      Portal vein thrombosis     left occlusion, partial main     Past Surgical History:   Procedure Laterality Date     ANKLE SURGERY Left      ANKLE SURGERY       ARTHROSCOPY KNEE       ARTHROSCOPY SHOULDER       ARTHROSCOPY SHOULDER ROTATOR CUFF REPAIR       COLONOSCOPY N/A 03/31/2016    Procedure: COLONOSCOPY;  Surgeon: Rhys Uriostegui MD;  Location:  GI     ESOPHAGOSCOPY, GASTROSCOPY, DUODENOSCOPY (EGD), COMBINED N/A 03/31/2016    Procedure: COMBINED ESOPHAGOSCOPY, GASTROSCOPY, DUODENOSCOPY (EGD);  Surgeon: Rhys Uriostegui MD;  Location:  GI     ESOPHAGOSCOPY, GASTROSCOPY, DUODENOSCOPY (EGD), COMBINED N/A 03/09/2018    Procedure: COMBINED ESOPHAGOSCOPY, GASTROSCOPY, DUODENOSCOPY (EGD), BIOPSY SINGLE OR MULTIPLE;  EGD;  Surgeon: Gonzalo Wahl MD;  Location:  GI     ESOPHAGOSCOPY, GASTROSCOPY, DUODENOSCOPY (EGD), COMBINED N/A 06/07/2019    Procedure: ESOPHAGOGASTRODUODENOSCOPY (EGD);  Surgeon: Gonzalo Wahl MD;  Location: [facility]     FOOT SURGERY       HIP SURGERY       IR PARACENTESIS  10/29/2019     IR PARACENTESIS  11/25/2020     IR PARACENTESIS  8/11/2021     IR PARACENTESIS  1/28/2022     IR TRANSVEN INTRAHEPATIC PORTOSYST REV  10/29/2019     IR TRANSVEN INTRAHEPATIC PORTOSYST REV  11/25/2020     IR TRANSVEN INTRAHEPATIC PORTOSYST REV  8/11/2021     IR TRANSVEN INTRAHEPATIC PORTOSYST REV  1/28/2022     KNEE SURGERY Left      KNEE SURGERY Right      RELEASE CARPAL TUNNEL       RELEASE TRIGGER FINGER Right 06/11/2020    Procedure: RELEASE, TRIGGER FINGER, right ring and long finger;  Surgeon: Pascual Valencia MD;  Location: [facility] OR     SIGMOIDOSCOPY FLEXIBLE N/A 10/31/2017    Procedure: SIGMOIDOSCOPY FLEXIBLE;;  Surgeon: Armaan Adams MD;  Location:  GI      TIPS Procedure  2018     TIPS PROCEDURE  2020     ZZC PLASTY KNEE,MED OR LAT COMPARTMT Right 2021    Procedure: RIGHT UNICOMPARTMENTAL ARTHROPLASTY KNEE MEDIAL;  Surgeon: José Miguel Landaverde MD;  Location: [facility];  Service: Orthopedics     Current Outpatient Medications   Medication     Ascorbic Acid (VITAMIN C PO)     ciprofloxacin (CIPRO) 500 MG tablet     eplerenone (INSPRA) 50 MG tablet     fexofenadine (ALLEGRA) 60 MG tablet     furosemide (LASIX) 20 MG tablet     lactulose (CHRONULAC) 10 GM/15ML solution     Menaquinone-7 (VITAMIN K2) 100 MCG CAPS     MULTIPLE VITAMINS PO     ondansetron (ZOFRAN-ODT) 4 MG ODT tab     potassium chloride ER (KLOR-CON M) 20 MEQ CR tablet     rifaximin (XIFAXAN) 550 MG TABS tablet     sertraline (ZOLOFT) 50 MG tablet     sildenafil (REVATIO) 20 MG tablet     sodium bicarbonate 650 MG tablet     tamsulosin (FLOMAX) 0.4 MG capsule     vitamin D3 (CHOLECALCIFEROL) 2000 units (50 mcg) tablet     acetaminophen (TYLENOL) 500 MG tablet     furosemide (LASIX) 40 MG tablet     No current facility-administered medications for this visit.        Allergies   Allergen Reactions     Prednisone Visual Disturbance     Trazodone Visual Disturbance     Benadryl [Diphenhydramine] Other (See Comments)     Delirium (visual and auditory hallucinations)     Social History     Socioeconomic History     Marital status:      Spouse name: Not on file     Number of children: Not on file     Years of education: Not on file     Highest education level: Not on file   Occupational History     Not on file   Tobacco Use     Smoking status: Former Smoker     Packs/day: 0.00     Types: Dip, chew, snus or snuff     Quit date: 1998     Years since quittin.4     Smokeless tobacco: Former User     Types: Chew     Tobacco comment: 1 tin per 10 days.   Vaping Use     Vaping Use: Never used   Substance and Sexual Activity     Alcohol use: No     Alcohol/week: 17.5 standard  "drinks     Types: 21 Cans of beer per week     Comment: last etoh 2/14/16, did have Odouls ~8/2017     Drug use: No     Sexual activity: Not Currently   Other Topics Concern     Parent/sibling w/ CABG, MI or angioplasty before 65F 55M? No   Social History Narrative     Not on file     Social Determinants of Health     Financial Resource Strain: Not on file   Food Insecurity: Not on file   Transportation Needs: Not on file   Physical Activity: Not on file   Stress: Not on file   Social Connections: Not on file   Intimate Partner Violence: Not on file   Housing Stability: Not on file     Family History   Problem Relation Age of Onset     Family History Negative Mother      Family History Negative Father      Hypertension Father      Cerebrovascular Disease Father 87     Breast Cancer Maternal Grandmother      Rheumatoid Arthritis Daughter      Depression Daughter      Substance Abuse Brother      Cancer - colorectal No family hx of      Prostate Cancer No family hx of      Liver Disease No family hx of            Review of Systems:  No chest pain, dyspnea, weight loss, fevers or night sweats.   All other systems questioned and negative.     Exam:  Vital signs for exam: Ht 1.778 m (5' 10\")   Wt 74.8 kg (165 lb)   BMI 23.68 kg/m    Constitutional: healthy, alert and no distress.  Head: Normocephalic. No masses, lesions, tenderness or abnormalities.  ENT: ENT exam normal, no neck nodes or sinus tenderness.  Cardiovascular: Normal S1, S2, no murmurs  Respiratory: Clear to auscultation.   Gastrointestinal:  Abdomen soft, mild tenderness to palpation in LUQ, reducible no tender umbilical hernia  : Deferred  Musculoskeletal: extremities normal- no gross deformities noted and normal muscle tone  Skin: no jaundice, rashes or petechiae.   Neurologic: Gait normal.  Psychiatric: Mentation appears normal and affect normal.    Laboratory Studies:    Latest CBC:  Lab Results   Component Value Date    WBC 5.0 02/02/2022    WBC " 4.3 06/23/2021     Lab Results   Component Value Date    RBC 3.34 02/02/2022    RBC 3.78 06/23/2021     Lab Results   Component Value Date    HGB 10.9 02/02/2022    HGB 12.1 06/23/2021     Lab Results   Component Value Date    HCT 32.8 02/02/2022    HCT 37.3 06/23/2021     Lab Results   Component Value Date    MCV 98 02/02/2022    MCV 99 06/23/2021     Lab Results   Component Value Date    MCH 32.6 02/02/2022    MCH 32.0 06/23/2021     Lab Results   Component Value Date    MCHC 33.2 02/02/2022    MCHC 32.4 06/23/2021     Lab Results   Component Value Date    RDW 18.3 02/02/2022    RDW 18.6 06/23/2021     Lab Results   Component Value Date    PLT 63 02/02/2022    PLT 58 06/23/2021       Latest Basic Metabolic Panel:  Lab Results   Component Value Date     02/02/2022     06/23/2021      Lab Results   Component Value Date    POTASSIUM 3.8 02/02/2022    POTASSIUM 4.0 06/23/2021     Lab Results   Component Value Date    CHLORIDE 112 02/02/2022    CHLORIDE 117 06/23/2021     Lab Results   Component Value Date    JULISSA 8.5 02/02/2022    JULISSA 8.5 06/23/2021     Lab Results   Component Value Date    CO2 23 02/02/2022    CO2 25 06/23/2021     Lab Results   Component Value Date    BUN 18 02/02/2022    BUN 8 06/23/2021     Lab Results   Component Value Date    CR 1.25 02/03/2022    CR 1.09 06/23/2021     Lab Results   Component Value Date    GLC 95 02/02/2022    GLC 77 06/23/2021       Latest Liver Studies:  Lab Results   Component Value Date    PROTTOTAL 5.7 02/02/2022    PROTTOTAL 5.6 06/23/2021     Lab Results   Component Value Date    ALBUMIN 2.2 02/02/2022    ALBUMIN 2.7 06/23/2021     Lab Results   Component Value Date    BILITOTAL 2.3 02/02/2022    BILITOTAL 3.6 06/23/2021     Lab Results   Component Value Date    ALKPHOS 146 02/02/2022    ALKPHOS 165 06/23/2021     Lab Results   Component Value Date    AST 37 02/02/2022    AST 33 06/23/2021     Lab Results   Component Value Date    ALT 20 02/02/2022    ALT 19  06/23/2021         Latest Lipase and Amylase:  Lab Results   Component Value Date    LIPASE 327 02/02/2022    LIPASE 281 03/26/2021       Latest Coagulation Studies:  No results found for: PT  Lab Results   Component Value Date    PTT 41 02/02/2022    PTT 44 10/29/2019     Lab Results   Component Value Date    INR 1.65 02/02/2022    INR 1.79 06/23/2021       Radiology:   US: Gallbladder: There is no wall thickening, pericholecystic fluid,  positive sonographic Zimmer's sign. Large shadowing gallstone in the  gallbladder neck, as well as layering biliary sludge    CT: Cirrhosis, portal hypertension, cholelithiasis, nephrolithiasis without obstruction      IMPRESSION AND PLAN:  56 year old man with cirrhosis, portal hypertension, status post TIPS revision with abdominal pain, and cholelithiasis. His pain is on the left side currently, and unlikely to be due to cholelithiasis. We discussed symptoms of gallbladder disease.   The pain could be of gastric origin. I gave him a prescription of PPIs and ordered  an upper endoscopy as his last once seems to have been in 2019.           Again, thank you for allowing me to participate in the care of your patient.        Sincerely,        Ngoc Buckner MD

## 2022-02-08 ENCOUNTER — PATIENT OUTREACH (OUTPATIENT)
Dept: GASTROENTEROLOGY | Facility: CLINIC | Age: 57
End: 2022-02-08
Payer: COMMERCIAL

## 2022-02-08 DIAGNOSIS — K70.31 ALCOHOLIC CIRRHOSIS OF LIVER WITH ASCITES (H): Primary | ICD-10-CM

## 2022-02-08 LAB
APPEARANCE STONE: NORMAL
COMPN STONE: NORMAL
SPECIMEN WT: 20 MG

## 2022-02-08 NOTE — CONFIDENTIAL NOTE
Patient called to reschedule a cancelled para from last Friday. He states he doesn't feel especially bloated or full of fluid. However, he is having increased abdominal pain on the left side of his abdomen. He states this pain started last Friday, and feels like an air bubble that won't go away. He is passing gas and stool, last this AM. He states the pain is increasing and it was so bad yesterday that he had to pull over and vomit. He has not been eating much, nothing today, due to nausea, but he is trying to continue to drink fluids. He had a general surgery consult yesterday, and she placed him on omeprazole and ordered an EGD. Patient has not taken oxycodone since Saturday, and he is wondering what Dr. Bales thinks he should do.  Per Dr. Bales, continue with the PPI as prescribed by another provider, and send fluid for testing at next para. Patient continues to pass stool and gas with no concerns. His para was moved up to this afternoon. Orders placed and called [facility] [place] to update need for fluid testing at today's para. Provided call back number if further questions.

## 2022-02-09 ENCOUNTER — HOSPITAL ENCOUNTER (OUTPATIENT)
Dept: ULTRASOUND IMAGING | Facility: CLINIC | Age: 57
Discharge: HOME | End: 2022-02-09
Attending: INTERNAL MEDICINE | Admitting: INTERNAL MEDICINE
Payer: COMMERCIAL

## 2022-02-09 VITALS
RESPIRATION RATE: 20 BRPM | OXYGEN SATURATION: 100 % | DIASTOLIC BLOOD PRESSURE: 72 MMHG | HEART RATE: 71 BPM | SYSTOLIC BLOOD PRESSURE: 132 MMHG | TEMPERATURE: 98.3 F

## 2022-02-09 DIAGNOSIS — K70.31 ALCOHOLIC CIRRHOSIS OF LIVER WITH ASCITES (H): ICD-10-CM

## 2022-02-09 LAB
ALBUMIN FLD-MCNC: 0.1 G/DL
APPEARANCE FLD: CLEAR
COLOR FLD: YELLOW
GRAM STAIN RESULT: NORMAL
GRAM STAIN RESULT: NORMAL
PROT FLD-MCNC: 0.5 G/DL
WBC # FLD AUTO: 85 /UL

## 2022-02-09 PROCEDURE — 89051 BODY FLUID CELL COUNT: CPT | Performed by: INTERNAL MEDICINE

## 2022-02-09 PROCEDURE — 250N000009 HC RX 250: Performed by: RADIOLOGY

## 2022-02-09 PROCEDURE — 82042 OTHER SOURCE ALBUMIN QUAN EA: CPT | Performed by: INTERNAL MEDICINE

## 2022-02-09 PROCEDURE — 272N000706 US PARACENTESIS

## 2022-02-09 PROCEDURE — 87205 SMEAR GRAM STAIN: CPT | Performed by: INTERNAL MEDICINE

## 2022-02-09 PROCEDURE — 87070 CULTURE OTHR SPECIMN AEROBIC: CPT | Performed by: INTERNAL MEDICINE

## 2022-02-09 PROCEDURE — 87015 SPECIMEN INFECT AGNT CONCNTJ: CPT | Performed by: INTERNAL MEDICINE

## 2022-02-09 PROCEDURE — 84157 ASSAY OF PROTEIN OTHER: CPT | Performed by: INTERNAL MEDICINE

## 2022-02-09 RX ORDER — LIDOCAINE 40 MG/G
CREAM TOPICAL
Status: DISCONTINUED | OUTPATIENT
Start: 2022-02-09 | End: 2022-02-10 | Stop reason: HOSPADM

## 2022-02-09 RX ORDER — ALBUMIN (HUMAN) 12.5 G/50ML
12.5 SOLUTION INTRAVENOUS ONCE
Status: DISCONTINUED | OUTPATIENT
Start: 2022-02-09 | End: 2022-02-09 | Stop reason: CLARIF

## 2022-02-09 RX ADMIN — LIDOCAINE HYDROCHLORIDE 7 ML: 10 INJECTION, SOLUTION EPIDURAL; INFILTRATION; INTRACAUDAL; PERINEURAL at 13:35

## 2022-02-09 NOTE — PROGRESS NOTES
RLQ paracentesis performed by radiologist.     3300Ml clear yellow fluid removed. Pressure held at site after catheter removed. No bleeding noted. No Albumin given per protocol.

## 2022-02-09 NOTE — DISCHARGE INSTRUCTIONS
Radiology  Discharge Instructions for Abdominal Paracentesis    You have had an abdominal paracentesis procedure today.  A needle or catheter was put into your abdomen to remove fluid for laboratory studies and/or to remove the extra fluid from your abdomen.    AFTER YOU ARE HOME:    Rest at home today.    Limit physical activity such as lifting, straining, or exercise for 48 hours.  You may resume normal activity in 24 hours.    Resume previous diet and medications.    You may remove bandage in 24 hours.    CALL YOUR PRIMARY PROVIDER IF:    You develop temperature over 101o F, or redness at the drainage site.    You have any other questions or concerns.    AFTER HOURS CALL [facility] NURSE ADVISORS AT [phone]    COME TO EMERGENCY ROOM IF:    You develop severe abdominal pain, swelling the size of a baseball or larger, continuous oozing from puncture site longer than 24 hours, bleeding that saturates a gauze dressing even after you have put direct pressure on the site for 15 minutes, severe lightheadedness or fainting.  DO NOT DRIVE YOURSELF.      Your ordering physician will contact you with the laboratory results.

## 2022-02-11 LAB
% LINING CELLS, BODY FLUID: 15 %
LYMPHOCYTES NFR FLD MANUAL: 21 %
MONOS+MACROS NFR FLD MANUAL: 59 %
NEUTS BAND NFR FLD MANUAL: 5 %

## 2022-02-14 LAB — BACTERIA FLD CULT: NO GROWTH

## 2022-02-14 NOTE — TELEPHONE ENCOUNTER
2/14 2nd attempt.  Provided phone number [phone] to schedule endoscopy.     Luzma wheeler Procedure   Orthopedics, Podiatry, Sports Medicine, ENT/Eye Specialties  [facility]   [phone]

## 2022-02-16 ENCOUNTER — ANCILLARY PROCEDURE (OUTPATIENT)
Dept: ULTRASOUND IMAGING | Facility: CLINIC | Age: 57
End: 2022-02-16
Attending: RADIOLOGY
Payer: COMMERCIAL

## 2022-02-16 DIAGNOSIS — K70.31 ALCOHOLIC CIRRHOSIS OF LIVER WITH ASCITES (H): ICD-10-CM

## 2022-02-16 PROCEDURE — 93975 VASCULAR STUDY: CPT | Performed by: RADIOLOGY

## 2022-02-18 ENCOUNTER — ANCILLARY PROCEDURE (OUTPATIENT)
Dept: ULTRASOUND IMAGING | Facility: CLINIC | Age: 57
End: 2022-02-18
Attending: INTERNAL MEDICINE
Payer: COMMERCIAL

## 2022-02-18 ENCOUNTER — OFFICE VISIT (OUTPATIENT)
Dept: INFUSION THERAPY | Facility: CLINIC | Age: 57
End: 2022-02-18
Attending: INTERNAL MEDICINE
Payer: COMMERCIAL

## 2022-02-18 VITALS
HEART RATE: 73 BPM | RESPIRATION RATE: 16 BRPM | DIASTOLIC BLOOD PRESSURE: 76 MMHG | TEMPERATURE: 98.3 F | BODY MASS INDEX: 23.7 KG/M2 | SYSTOLIC BLOOD PRESSURE: 146 MMHG | OXYGEN SATURATION: 95 % | WEIGHT: 165.2 LBS

## 2022-02-18 DIAGNOSIS — K70.31 ALCOHOLIC CIRRHOSIS OF LIVER WITH ASCITES (H): Primary | ICD-10-CM

## 2022-02-18 PROCEDURE — 272N000706 US PARACENTESIS

## 2022-02-18 PROCEDURE — U0005 INFEC AGEN DETEC AMPLI PROBE: HCPCS | Performed by: INTERNAL MEDICINE

## 2022-02-18 PROCEDURE — 250N000011 HC RX IP 250 OP 636: Performed by: INTERNAL MEDICINE

## 2022-02-18 PROCEDURE — P9047 ALBUMIN (HUMAN), 25%, 50ML: HCPCS | Performed by: INTERNAL MEDICINE

## 2022-02-18 PROCEDURE — 49083 ABD PARACENTESIS W/IMAGING: CPT | Performed by: RADIOLOGY

## 2022-02-18 PROCEDURE — 250N000009 HC RX 250: Performed by: INTERNAL MEDICINE

## 2022-02-18 RX ORDER — NALOXONE HYDROCHLORIDE 0.4 MG/ML
0.2 INJECTION, SOLUTION INTRAMUSCULAR; INTRAVENOUS; SUBCUTANEOUS
Status: CANCELLED | OUTPATIENT
Start: 2022-02-18

## 2022-02-18 RX ORDER — LIDOCAINE HYDROCHLORIDE 10 MG/ML
20 INJECTION, SOLUTION EPIDURAL; INFILTRATION; INTRACAUDAL; PERINEURAL ONCE
Status: COMPLETED | OUTPATIENT
Start: 2022-02-18 | End: 2022-02-18

## 2022-02-18 RX ORDER — ALBUTEROL SULFATE 90 UG/1
1-2 AEROSOL, METERED RESPIRATORY (INHALATION)
Status: CANCELLED
Start: 2022-02-18

## 2022-02-18 RX ORDER — LIDOCAINE HYDROCHLORIDE 10 MG/ML
20 INJECTION, SOLUTION EPIDURAL; INFILTRATION; INTRACAUDAL; PERINEURAL ONCE
Status: CANCELLED | OUTPATIENT
Start: 2022-02-18 | End: 2022-02-18

## 2022-02-18 RX ORDER — HEPARIN SODIUM (PORCINE) LOCK FLUSH IV SOLN 100 UNIT/ML 100 UNIT/ML
5 SOLUTION INTRAVENOUS
Status: DISCONTINUED | OUTPATIENT
Start: 2022-02-18 | End: 2022-02-18 | Stop reason: HOSPADM

## 2022-02-18 RX ORDER — EPINEPHRINE 1 MG/ML
0.3 INJECTION, SOLUTION INTRAMUSCULAR; SUBCUTANEOUS EVERY 5 MIN PRN
Status: CANCELLED | OUTPATIENT
Start: 2022-02-18

## 2022-02-18 RX ORDER — DIPHENHYDRAMINE HYDROCHLORIDE 50 MG/ML
50 INJECTION INTRAMUSCULAR; INTRAVENOUS
Status: CANCELLED
Start: 2022-02-18

## 2022-02-18 RX ORDER — ALBUMIN (HUMAN) 12.5 G/50ML
12.5 SOLUTION INTRAVENOUS
Status: DISCONTINUED | OUTPATIENT
Start: 2022-02-18 | End: 2022-02-18 | Stop reason: HOSPADM

## 2022-02-18 RX ORDER — METHYLPREDNISOLONE SODIUM SUCCINATE 125 MG/2ML
125 INJECTION, POWDER, LYOPHILIZED, FOR SOLUTION INTRAMUSCULAR; INTRAVENOUS
Status: CANCELLED
Start: 2022-02-18

## 2022-02-18 RX ORDER — ALBUTEROL SULFATE 0.83 MG/ML
2.5 SOLUTION RESPIRATORY (INHALATION)
Status: CANCELLED | OUTPATIENT
Start: 2022-02-18

## 2022-02-18 RX ORDER — HEPARIN SODIUM,PORCINE 10 UNIT/ML
5 VIAL (ML) INTRAVENOUS
Status: DISCONTINUED | OUTPATIENT
Start: 2022-02-18 | End: 2022-02-18 | Stop reason: HOSPADM

## 2022-02-18 RX ORDER — HEPARIN SODIUM,PORCINE 10 UNIT/ML
5 VIAL (ML) INTRAVENOUS
Status: CANCELLED | OUTPATIENT
Start: 2022-02-18

## 2022-02-18 RX ORDER — HEPARIN SODIUM (PORCINE) LOCK FLUSH IV SOLN 100 UNIT/ML 100 UNIT/ML
5 SOLUTION INTRAVENOUS
Status: CANCELLED | OUTPATIENT
Start: 2022-02-18

## 2022-02-18 RX ORDER — ALBUMIN (HUMAN) 12.5 G/50ML
12.5 SOLUTION INTRAVENOUS
Status: CANCELLED | OUTPATIENT
Start: 2022-02-18

## 2022-02-18 RX ORDER — MEPERIDINE HYDROCHLORIDE 25 MG/ML
25 INJECTION INTRAMUSCULAR; INTRAVENOUS; SUBCUTANEOUS EVERY 30 MIN PRN
Status: CANCELLED | OUTPATIENT
Start: 2022-02-18

## 2022-02-18 RX ADMIN — LIDOCAINE HYDROCHLORIDE 10 ML: 10 INJECTION, SOLUTION EPIDURAL; INFILTRATION; INTRACAUDAL; PERINEURAL at 14:20

## 2022-02-18 RX ADMIN — ALBUMIN HUMAN 12.5 G: 0.25 SOLUTION INTRAVENOUS at 14:48

## 2022-02-18 RX ADMIN — ALBUMIN HUMAN 12.5 G: 0.25 SOLUTION INTRAVENOUS at 14:35

## 2022-02-18 ASSESSMENT — PAIN SCALES - GENERAL: PAINLEVEL: MODERATE PAIN (4)

## 2022-02-18 NOTE — PATIENT INSTRUCTIONS
Patient Education     Discharge Instructions for Paracentesis  Paracentesis is a procedure to remove extra fluid from your belly (abdomen). This fluid buildup in the abdomen is called ascites. The procedure may have been done to take a sample of the fluid. Or, it may have been done to drain the extra fluid from your abdomen and help make you more comfortable.      Ascites is buildup of excess fluid in the abdomen.   Home care    If you have pain after the procedure, your healthcare provider can prescribe or recommend pain medicines. Take these exactly as directed. If you stopped taking other medicines before the procedure, ask your provider when you can start them again.    Take it easy for 24 hours after the procedure. Don't do any physical activity until your provider says it s OK.    You will have a small bandage over the puncture site. Stitches, surgical staples, adhesive tapes, adhesive strips, or surgical glue may be used to close the incision. They also help stop bleeding and speed healing. You may take the bandage off in 24 hours.    Check the puncture site for the signs of infection listed below.    Follow-up care  Make a follow-up appointment with your healthcare provider as directed. During your follow-up visit, your provider will check your healing. Let your provider know how you are feeling. You can also discuss the cause of your ascites and if you need any further treatment. If your fluid is infected, you will be sent home on antibiotics. In some cases, the paracentesis may need to be repeated if the fluid returns. Your provider may also prescribe medicines that increase urination (diuretics) to decrease the buildup of fluid.   When to call your healthcare provider  Call your healthcare provider if you have any of the following after the procedure:    A fever of 100.4  F ( 38.0 C) or higher, or as directed by your provider    Chills    Trouble breathing    Pain that doesn't go away even after taking  pain medicine    Belly pain not caused by having the skin punctured    Bleeding from the puncture site    More than a small amount of fluid leaking from the puncture site    Swollen belly    Signs of infection at the puncture site. These include increased pain, redness, or swelling, warmth, or bad-smelling drainage.    Blood in your urine    Feeling dizzy or lightheaded, or fainting  Sae last reviewed this educational content on 10/1/2019    1384-7430 [employer]. All rights reserved. This information is not intended as a substitute for professional medical care. Always follow your healthcare professional's instructions.

## 2022-02-18 NOTE — LETTER
2/18/2022         RE: Ivan Bell  [address] [facility] [address]        Dear Colleague,    Thank you for referring your patient, Ivan Bell, to the [facility] TREATMENT [facility]. Please see a copy of my visit note below.    Paracentesis Nursing Note  Ivan Bell presents today to [facility] for [facility]acentesis.    During today's appointment orders from Gonzalo Wahl MD were completed.    Progress Note:  Patient identification verified by name and date of birth.  Assessment completed.  Vitals monitored throughout appointment and recorded in Doc Flowsheets.  See proceduralist note in ultrasound.    Vascular Access: peripheral IV placed today.  Labs: were drawn per orders.     Date of consent or authorization: 12/27/2021.  Invasive Procedure Safety Checklist was completed and sent for scanning.     Paracentesis performed by Vincent Dukes MD Radiology.    The following labs were communicated to provider performing paracentesis:  Lab Results   Component Value Date    PLT 63 02/02/2022    PLT 58 06/23/2021       Total amount of ascites fluid drained: 5.6 liters.  Color of ascites fluid: yellow.  Total amount of albumin given: 25  grams.    Patient tolerated procedure well.    Post procedure,denies pain or discomfort post paracentesis.    Asymptomatic COVID test date: Today (If next appt is within 2 weeks, COVID test to be done in [facility])      Discharge Plan:  Discharge instructions were reviewed with patient.  Patient/Representative verbalized understanding and all questions were answered.   Discharged from [facility] in stable condition.    Jolanta Ferguson RN    Administrations This Visit     albumin human 25 % injection 12.5 g     Admin Date  02/18/2022 Action  New Bag Dose  12.5 g Route  Intravenous Administered By  Jolanta Ferguson RN          lidocaine (PF) (XYLOCAINE) 1 % injection 20 mL      Admin Date  02/18/2022 Action  Given Dose  10 mL Route  Subcutaneous Administered By  Jolanta Ferguson RN              /67   Pulse 67   Temp 98.3  F (36.8  C) (Oral)   Resp 16   Wt 80.3 kg (177 lb)   SpO2 95%   BMI 25.40 kg/m            Again, thank you for allowing me to participate in the care of your patient.      Sincerely,    [facility] Provider

## 2022-02-18 NOTE — PROGRESS NOTES
Paracentesis Nursing Note  Ivan Bell presents today to [facility] for a paracentesis.    During today's appointment orders from Gonzalo Wahl MD were completed.    Progress Note:  Patient identification verified by name and date of birth.  Assessment completed.  Vitals monitored throughout appointment and recorded in Doc Flowsheets.  See proceduralist note in ultrasound.    Vascular Access: peripheral IV placed today.  Labs: were drawn per orders.     Date of consent or authorization: 12/27/2021.  Invasive Procedure Safety Checklist was completed and sent for scanning.     Paracentesis performed by Vincent Dukes MD Radiology.    The following labs were communicated to provider performing paracentesis:  Lab Results   Component Value Date    PLT 63 02/02/2022    PLT 58 06/23/2021       Total amount of ascites fluid drained: 5.6 liters.  Color of ascites fluid: yellow.  Total amount of albumin given: 25  grams.    Patient tolerated procedure well.    Post procedure,denies pain or discomfort post paracentesis.    Asymptomatic COVID test date: Today (If next appt is within 2 weeks, COVID test to be done in [facility])      Discharge Plan:  Discharge instructions were reviewed with patient.  Patient/Representative verbalized understanding and all questions were answered.   Discharged from [facility] in stable condition.    Jolanta Ferguson RN    Administrations This Visit     albumin human 25 % injection 12.5 g     Admin Date  02/18/2022 Action  New Bag Dose  12.5 g Route  Intravenous Administered By  Jolanta Ferguson RN          lidocaine (PF) (XYLOCAINE) 1 % injection 20 mL     Admin Date  02/18/2022 Action  Given Dose  10 mL Route  Subcutaneous Administered By  Jolanta Ferguson RN                /67   Pulse 67   Temp 98.3  F (36.8  C) (Oral)   Resp 16   Wt 80.3 kg (177 lb)   SpO2 95%   BMI 25.40 kg/m

## 2022-02-19 LAB — SARS-COV-2 RNA RESP QL NAA+PROBE: NEGATIVE

## 2022-02-28 ENCOUNTER — PATIENT OUTREACH (OUTPATIENT)
Dept: GASTROENTEROLOGY | Facility: CLINIC | Age: 57
End: 2022-02-28
Payer: COMMERCIAL

## 2022-02-28 NOTE — PROGRESS NOTES
Patient called with questions regarding a bill for paracentesis that was refused by his insurance. Provided the number for [facility] at [phone]. Patient also needs assistance in completing his on-line assessment for CD evaluation. Provided number for [facility] at [phone]. Patient has no further questions or concerns at this time.

## 2022-03-01 ENCOUNTER — VIRTUAL VISIT (OUTPATIENT)
Dept: RADIOLOGY | Facility: CLINIC | Age: 57
End: 2022-03-01
Attending: RADIOLOGY
Payer: COMMERCIAL

## 2022-03-01 DIAGNOSIS — Z95.828 S/P TIPS (TRANSJUGULAR INTRAHEPATIC PORTOSYSTEMIC SHUNT): Primary | ICD-10-CM

## 2022-03-01 PROCEDURE — 99214 OFFICE O/P EST MOD 30 MIN: CPT | Mod: TEL | Performed by: RADIOLOGY

## 2022-03-01 NOTE — LETTER
3/1/2022         RE: Ivan Bell  [id number] [address] [place] [address]        Dear Colleague,    Thank you for referring your patient, Ivan Bell, to the [facility]. Please see a copy of my visit note below.    Ivan is a 56 year old who is being evaluated via a billable telephone visit.      Pt has pain in middle of abdomen and a low appetite.     What phone number would you like to be contacted at? [phone]  How would you like to obtain your AVS? Frances  Phone call duration: 12 minutes    Carin ROSALES          INTERVENTIONAL RADIOLOGY CLINIC VISIT - FOLLOW UP    Name: Ivan Bell  Age: 56 year old   Referral: Dr. Bales       HPI: Ivan Bell is a 56 year old with history of cirrhosis 2/2 EtOH s/p esophageal variceal banding, TIPS placement 6/2018 and multiple TIPS revisions d/t recurrent ascites, most recently 1/28/22, who returns to clinic for 1 month follow up. Last seen in clinic 1/10/22 and was previously a patient of Dr. Elaine. He had ongoing abdominal pain post-TIPS revision and was dx with a R ureteral stone after presenting to the ED 2/2. More recently, he has been having L-sided abdominal pain and associated n/v which is a little worse since his TIPS revision (he did have this abdominal pain pre-TIPS revision). He saw general surgery 2/7 who started omeprazole and ordered an EGD. Ascites fluid workup at that time was negative.     Since then, he has continued to have significant abdominal pain as well as n/v.  He has been needing almost weekly paracentesis, draining approximately 3-6L. He has no complaints otherwise and denies HE, jaundice, or GI bleeds.     TIPS History:   6/6/18 TIPS Placement  10/29/19 TIPS revision  11/25/20 TIPS revision  1/28/22 TIPS revision       I have seen his imaging and labs.    PAST MEDICAL HISTORY:   Past Medical History:   Diagnosis Date     Alcoholic cirrhosis of liver (H)      Alcoholic hepatitis 03/2019      Chronic kidney disease     CRF     Depression      Gout      History of transfusion      Hypertension      Hypertension      Hypertriglyceridemia      Left calcaneus fracture 01/09/2006 January 16, 2006: Fell 10 feet from ladder onto left foot on frozen ground on 1/9/06 at home.  Immediate pain and unable to walk- seen at [place] and diagnosed with calcaneus fracture     Nephrolithiasis      Osteoarthritis      Portal vein thrombosis     left occlusion, partial main       PAST SURGICAL HISTORY:   Past Surgical History:   Procedure Laterality Date     ANKLE SURGERY Left      ANKLE SURGERY       ARTHROSCOPY KNEE       ARTHROSCOPY SHOULDER       ARTHROSCOPY SHOULDER ROTATOR CUFF REPAIR       COLONOSCOPY N/A 03/31/2016    Procedure: COLONOSCOPY;  Surgeon: Rhys Uriostegui MD;  Location:  GI     ESOPHAGOSCOPY, GASTROSCOPY, DUODENOSCOPY (EGD), COMBINED N/A 03/31/2016    Procedure: COMBINED ESOPHAGOSCOPY, GASTROSCOPY, DUODENOSCOPY (EGD);  Surgeon: Rhys Uriostegui MD;  Location:  GI     ESOPHAGOSCOPY, GASTROSCOPY, DUODENOSCOPY (EGD), COMBINED N/A 03/09/2018    Procedure: COMBINED ESOPHAGOSCOPY, GASTROSCOPY, DUODENOSCOPY (EGD), BIOPSY SINGLE OR MULTIPLE;  EGD;  Surgeon: Gonzalo Wahl MD;  Location:  GI     ESOPHAGOSCOPY, GASTROSCOPY, DUODENOSCOPY (EGD), COMBINED N/A 06/07/2019    Procedure: ESOPHAGOGASTRODUODENOSCOPY (EGD);  Surgeon: Gonzalo Wahl MD;  Location:  GI     FOOT SURGERY       HIP SURGERY       IR PARACENTESIS  10/29/2019     IR PARACENTESIS  11/25/2020     IR PARACENTESIS  8/11/2021     IR PARACENTESIS  1/28/2022     IR TRANSVEN INTRAHEPATIC PORTOSYST REV  10/29/2019     IR TRANSVEN INTRAHEPATIC PORTOSYST REV  11/25/2020     IR TRANSVEN INTRAHEPATIC PORTOSYST REV  8/11/2021     IR TRANSVEN INTRAHEPATIC PORTOSYST REV  1/28/2022     KNEE SURGERY Left      KNEE SURGERY Right      RELEASE CARPAL TUNNEL       RELEASE TRIGGER FINGER Right 06/11/2020     Procedure: RELEASE, TRIGGER FINGER, right ring and long finger;  Surgeon: Pascual Valencia MD;  Location: [facility] OR     SIGMOIDOSCOPY FLEXIBLE N/A 10/31/2017    Procedure: SIGMOIDOSCOPY FLEXIBLE;;  Surgeon: Armaan Adams MD;  Location: [facility] GI     TIPS Procedure  2018     TIPS PROCEDURE  2020     ZZC PLASTY KNEE,MED OR LAT COMPARTMT Right 2021    Procedure: RIGHT UNICOMPARTMENTAL ARTHROPLASTY KNEE MEDIAL;  Surgeon: José Miguel Landaverde MD;  Location: [facility] OR;  Service: Orthopedics       FAMILY HISTORY:   Family History   Problem Relation Age of Onset     Family History Negative Mother      Family History Negative Father      Hypertension Father      Cerebrovascular Disease Father 87     Breast Cancer Maternal Grandmother      Rheumatoid Arthritis Daughter      Depression Daughter      Substance Abuse Brother      Cancer - colorectal No family hx of      Prostate Cancer No family hx of      Liver Disease No family hx of        SOCIAL HISTORY:   Social History     Tobacco Use     Smoking status: Former Smoker     Packs/day: 0.00     Types: Dip, chew, snus or snuff     Quit date: 1998     Years since quittin.5     Smokeless tobacco: Former User     Types: Chew     Tobacco comment: 1 tin per 10 days.   Substance Use Topics     Alcohol use: No     Alcohol/week: 17.5 standard drinks     Types: 21 Cans of beer per week     Comment: last etoh 16, did have Odouls ~2017       PROBLEM LIST:   Patient Active Problem List    Diagnosis Date Noted     Cervicalgia 2021     Priority: Medium     Acute low back pain 2021     Priority: Medium     Thrombocytopenia (H) 2020     Priority: Medium     CKD (chronic kidney disease) stage 2, GFR 60-89 ml/min 2020     Priority: Medium     Seasonal allergic rhinitis, unspecified trigger 2020     Priority: Medium     Nephrolithiasis 2018     Priority: Medium     2018 non-obstructing, incident finding on  us.        Alcoholic cirrhosis of liver with ascites (H) 03/08/2016     Priority: Medium     September 23, 2018 now sober, ascites resolved, S/P TIP procedure 6/2018. Normal liver enzymes, diminished liver function.  INR 1.6, bilirubin 2.5, albumin 2.6. He  appears healthy. Doing well. Stressed the importance of abstaining from alcohol. See copy of recent us.     9/10/18:  Impression:   1. Patent TIPS with appropriate in stent velocities. Normal Doppler  evaluation of the remainder of the hepatic vasculature in the setting  of TIPS.  2. Cirrhotic hepatic morphology with evidence of portal hypertension,  including splenomegaly. No focal hepatic mass.  3. Cholelithiasis without evidence of cholecystitis.  4. Bilateral nonobstructing nephrolithiasis.            Hypertension goal BP (blood pressure) < 140/90 12/18/2012     Priority: Medium     24 hour contact given to patient  01/02/2012     Priority: Medium     EMERGENCY CARE PLAN  Presenting Problem Signs and Symptoms Treatment Plan    Questions or conerns during clinic hours    I will call the clinic directly     Questions or conerns outside clinic hours    I will call the 24 hour nurse line at [phone]    Patient needs to schedule an appointment    I will call the 24 hour scheduling team at [phone] or clinic directly    Same day treatment     I will call the clinic first, nurse line if after hours, urgent care and express care if needed                                 CARDIOVASCULAR SCREENING; LDL GOAL LESS THAN 130 10/31/2010     Priority: Medium     Erectile dysfunction 01/29/2008     Priority: Medium     September 23, 2018 no known CAD, no contraindications to sildenafil.        Gouty arthropathy 12/31/2006     Priority: Medium     Problem list name updated by automated process. Provider to review and confirm  [employer] Update utility       Hypertriglyceridemia 08/03/2005     Priority: Medium     Last Exam: December 19, 2007  Last Lipids: CHOL      211    01/25/2007 LDL      104   01/25/2007 HDL       83   01/25/2007  Last LFTs:: AST      106   01/25/2007   ALT       53   01/25/2007    TRIG      120   01/25/2007  TRIG      115   01/16/2006  Meds: Lopid 600 bid - tolerating         MEDICATIONS:   Prescription Medications as of 3/1/2022       Rx Number Disp Refills Start End Last Dispensed Date Next Fill Date Owning Pharmacy    acetaminophen (TYLENOL) 500 MG tablet            Sig: Take 1,000 mg by mouth every 6 hours as needed for mild pain     Class: Historical    Route: Oral    Ascorbic Acid (VITAMIN C PO)        [address]    Sig: Take by mouth daily    Class: Historical    Route: Oral    ciprofloxacin (CIPRO) 500 MG tablet  14 tablet 0 2/3/2022    [facility] [place]    Sig: Take 1 tablet (500 mg) by mouth 2 times daily    Class: E-Prescribe    Route: Oral    eplerenone (INSPRA) 50 MG tablet  270 tablet 3 9/29/2021    [facility] [place]    Sig: Take 2 tablets (100 mg) by mouth every morning AND 1 tablet (50 mg) every evening.    Class: E-Prescribe    Route: Oral    fexofenadine (ALLEGRA) 60 MG tablet  30 tablet 1 4/17/2020    [address]    Sig: Take 1 tablet (60 mg) by mouth daily    Class: E-Prescribe    Notes to Pharmacy: Renal dosing, start with 60 mg daily    Route: Oral    furosemide (LASIX) 20 MG tablet  90 tablet 3 11/9/2021    [facility] [place]    Sig: Take 1 tablet (20 mg) by mouth every evening    Class: E-Prescribe    Route: Oral    furosemide (LASIX) 40 MG tablet  90 tablet 3 11/9/2021    [facility] [place]    Sig: Take 1 tablet (40 mg) by mouth every morning    Class: E-Prescribe    Route: Oral    lactulose (CHRONULAC) 10 GM/15ML solution  2000 mL 11 10/14/2021     [facility] [place] [address]    Sig: TAKE 30MLS BY MOUTH THREE TIMES DAILY AS NEEDED FOR CONSTIPATION (TAKE AS NEEDED TO MAINTAIN 3 TO 4 BOWEL MOVEMENTS DAILY)    Class: E-Prescribe    Menaquinone-7 (VITAMIN K2) 100 MCG CAPS            Sig: Take 200 mcg by mouth daily     Class: Historical    Route: Oral    MULTIPLE VITAMINS PO            Sig: Take 1 tablet by mouth daily    Class: Historical    Route: Oral    omeprazole (PRILOSEC OTC) 20 MG EC tablet  30 tablet 3 2/7/2022 3/9/2022   [facility] [place] [address]    Sig: Take 1 tablet (20 mg) by mouth daily    Class: E-Prescribe    Route: Oral    ondansetron (ZOFRAN-ODT) 4 MG ODT tab  30 tablet 0 2/3/2022    [facility] [place] [address]    Sig: Take 1 tablet (4 mg) by mouth every 8 hours as needed for nausea    Class: E-Prescribe    Route: Oral    potassium chloride ER (KLOR-CON M) 20 MEQ CR tablet  120 tablet 1 1/4/2022    [facility] [place] [address]    Sig: Take 2 tablets (40 mEq) by mouth daily    Class: E-Prescribe    Route: Oral    rifaximin (XIFAXAN) 550 MG TABS tablet  180 tablet 3 10/20/2021    [facility] [place] [address]    Sig: Take 1 tablet (550 mg) by mouth 2 times daily    Class: E-Prescribe    Route: Oral    sertraline (ZOLOFT) 50 MG tablet  45 tablet 3 2/10/2021    [facility] [place] [address]    Sig: TAKE 1/2 A TABLET BY MOUTH ONCE DAILY    Class: E-Prescribe    sildenafil (REVATIO) 20 MG tablet  30 tablet 11 2/11/2021    [facility] [place] [address]    Sig: Take 3-5 tabs 1/2-4 hours to relations    Class: E-Prescribe    sodium bicarbonate 650 MG tablet  180 tablet 3 8/10/2021    [facility] [place] [address]    Sig: Take 1 tablet (650 mg) by mouth 2 times daily    Class:  E-Prescribe    Route: Oral    tamsulosin (FLOMAX) 0.4 MG capsule  7 capsule 1 2/3/2022    [facility] [place], [place] - [address] [facility]    Sig: Take 1 capsule (0.4 mg) by mouth daily    Class: E-Prescribe    Route: Oral    vitamin D3 (CHOLECALCIFEROL) 2000 units (50 mcg) tablet            Sig: Take 1 tablet by mouth daily    Class: Historical    Route: Oral          ALLERGIES:   Prednisone, Trazodone, and Benadryl [diphenhydramine]    ROS:  A brief review of systems was negative as detailed in the HPI.      Physical Examination:   VITALS:   There were no vitals taken for this visit.  A limited virtual physical exam was performed for this phone visit.   General: Awake, alert, NAD  Resp: Breathing comfortably on room air, able to speak in full sentences    Labs:  BMP RESULTS:  Lab Results   Component Value Date     02/02/2022     (H) 06/23/2021    POTASSIUM 3.8 02/02/2022    POTASSIUM 4.0 06/23/2021    CHLORIDE 112 (H) 02/02/2022    CHLORIDE 117 (H) 06/23/2021    CO2 23 02/02/2022    CO2 25 06/23/2021    ANIONGAP 6 02/02/2022    ANIONGAP 6 06/23/2021    GLC 95 02/02/2022    GLC 77 06/23/2021    BUN 18 02/02/2022    BUN 8 06/23/2021    CR 1.25 02/03/2022    CR 1.09 06/23/2021    GFRESTIMATED 67 02/03/2022    GFRESTIMATED 75 06/23/2021    GFRESTBLACK 86 06/23/2021    JULISSA 8.5 02/02/2022    JULISSA 8.5 06/23/2021        CBC RESULTS:  Lab Results   Component Value Date    WBC 5.0 02/02/2022    WBC 4.3 06/23/2021    RBC 3.34 (L) 02/02/2022    RBC 3.78 (L) 06/23/2021    HGB 10.9 (L) 02/02/2022    HGB 12.1 (L) 06/23/2021    HCT 32.8 (L) 02/02/2022    HCT 37.3 (L) 06/23/2021    MCV 98 02/02/2022    MCV 99 06/23/2021    MCH 32.6 02/02/2022    MCH 32.0 06/23/2021    MCHC 33.2 02/02/2022    MCHC 32.4 06/23/2021    RDW 18.3 (H) 02/02/2022    RDW 18.6 (H) 06/23/2021    PLT 63 (L) 02/02/2022    PLT 58 (L) 06/23/2021       INR/PTT:  Lab Results   Component Value Date    INR 1.65 (H) 02/02/2022    INR 1.79  (H) 06/23/2021    PTT 41 (H) 02/02/2022    PTT 44 (H) 10/29/2019       Diagnostic studies:   ULTRASOUND TIPS DOPPLER 2/16/2022 7:20 AM  IMPRESSION:   1. Elevated velocity at the hepatic end of the TIPS to 299 cm/s  suggests recurrent stenosis.  2. Persistent large volume ascites.  3. Right and left portal veins remain retrograde in flow.  4. Segment 4 LI-RADS 3 lesion not demonstrated in this study.    US ABDOMEN COMPLETE WITH TIPSS DOPPLER 2/2/2022 4:52 PM   There is flow towards the liver in the main portal vein:  37 cm/sec in the main portal vein  Retrograde at 15 cm/sec in the right portal vein  Retrograde  at 17 cm/sec in the left portal vein     The stent velocities are  131 cm/sec at the portal vein  174 cm/sec in mid stent  147 cm/sec in the right hepatic vein distal shunt end.      The splenic vein is patent and flow is towards the liver.  The left,  middle, and right hepatic veins are patent with flow towards the IVC.  The IVC is patent with flow towards the heart.   The visualized aorta  is not dilated.    Assessment Ivan Bell is a 56 year old with history of cirrhosis 2/2 EtOH s/p esophageal variceal banding, TIPS placement 6/2018 and multiple TIPS revisions d/t recurrent ascites, most recently 1/28/22, who returns to clinic for 1 month follow up. Unfortunately his ascites has not yet been relieved and he continues to have abdominal pain. US is also consistent with a possible early stent stenosis with elevated hepatic vein stent velocities compared to US from 2/2, although flow remains retrograde through the portal veins. It is possible that he will need an additional stent with another TIPS revision if his ascites does not improve within the next few months. Discussed importance of follow-up with Dr. Bales for abdominal pain, possibly concerning for abdominal hernia. Pt is amenable to the plan. We will continue to follow.    Plan   - F/u IR clinic with repeat abd US in April   - At that appt, if  ascites has not improved, consider new stent at hepatic end of TIPS and TIPS revision  - Encouraged follow-up with Dr. Bales regarding abdominal pain    Flaca Carrasco, MS3  [employer]  March 1, 2022    I have visited this patient virtually together with the medical student. I have reviewed, edited the note above and approves it.       Again, thank you for allowing me to participate in the care of your patient.      Sincerely,    Michelle Trinidad MD

## 2022-03-01 NOTE — PROGRESS NOTES
Ivan is a 56 year old who is being evaluated via a billable telephone visit.      Pt has pain in middle of abdomen and a low appetite.     What phone number would you like to be contacted at? [phone]  How would you like to obtain your AVS? Frances  Phone call duration: 12 minutes    Carin ROSALES          INTERVENTIONAL RADIOLOGY CLINIC VISIT - FOLLOW UP    Name: Ivan Bell  Age: 56 year old   Referral: Dr. Bales       HPI: Ivan Bell is a 56 year old with history of cirrhosis 2/2 EtOH s/p esophageal variceal banding, TIPS placement 6/2018 and multiple TIPS revisions d/t recurrent ascites, most recently 1/28/22, who returns to clinic for 1 month follow up. Last seen in clinic 1/10/22 and was previously a patient of Dr. Elaine. He had ongoing abdominal pain post-TIPS revision and was dx with a R ureteral stone after presenting to the ED 2/2. More recently, he has been having L-sided abdominal pain and associated n/v which is a little worse since his TIPS revision (he did have this abdominal pain pre-TIPS revision). He saw general surgery 2/7 who started omeprazole and ordered an EGD. Ascites fluid workup at that time was negative.     Since then, he has continued to have significant abdominal pain as well as n/v.  He has been needing almost weekly paracentesis, draining approximately 3-6L. He has no complaints otherwise and denies HE, jaundice, or GI bleeds.     TIPS History:   6/6/18 TIPS Placement  10/29/19 TIPS revision  11/25/20 TIPS revision  1/28/22 TIPS revision       I have seen his imaging and labs.    PAST MEDICAL HISTORY:   Past Medical History:   Diagnosis Date     Alcoholic cirrhosis of liver (H)      Alcoholic hepatitis 03/2019     Chronic kidney disease     CRF     Depression      Gout      History of transfusion      Hypertension      Hypertension      Hypertriglyceridemia      Left calcaneus fracture 01/09/2006 January 16, 2006: Fell 10 feet from ladder onto left foot on frozen  ground on 1/9/06 at home.  Immediate pain and unable to walk- seen at [place] and diagnosed with calcaneus fracture     Nephrolithiasis      Osteoarthritis      Portal vein thrombosis     left occlusion, partial main       PAST SURGICAL HISTORY:   Past Surgical History:   Procedure Laterality Date     ANKLE SURGERY Left      ANKLE SURGERY       ARTHROSCOPY KNEE       ARTHROSCOPY SHOULDER       ARTHROSCOPY SHOULDER ROTATOR CUFF REPAIR       COLONOSCOPY N/A 03/31/2016    Procedure: COLONOSCOPY;  Surgeon: Rhys Uriostegui MD;  Location:  GI     ESOPHAGOSCOPY, GASTROSCOPY, DUODENOSCOPY (EGD), COMBINED N/A 03/31/2016    Procedure: COMBINED ESOPHAGOSCOPY, GASTROSCOPY, DUODENOSCOPY (EGD);  Surgeon: Rhys Uriostegui MD;  Location:  GI     ESOPHAGOSCOPY, GASTROSCOPY, DUODENOSCOPY (EGD), COMBINED N/A 03/09/2018    Procedure: COMBINED ESOPHAGOSCOPY, GASTROSCOPY, DUODENOSCOPY (EGD), BIOPSY SINGLE OR MULTIPLE;  EGD;  Surgeon: Gonzalo Wahl MD;  Location:  GI     ESOPHAGOSCOPY, GASTROSCOPY, DUODENOSCOPY (EGD), COMBINED N/A 06/07/2019    Procedure: ESOPHAGOGASTRODUODENOSCOPY (EGD);  Surgeon: Gonzalo Wahl MD;  Location: [facility]     FOOT SURGERY       HIP SURGERY       IR PARACENTESIS  10/29/2019     IR PARACENTESIS  11/25/2020     IR PARACENTESIS  8/11/2021     IR PARACENTESIS  1/28/2022     IR TRANSVEN INTRAHEPATIC PORTOSYST REV  10/29/2019     IR TRANSVEN INTRAHEPATIC PORTOSYST REV  11/25/2020     IR TRANSVEN INTRAHEPATIC PORTOSYST REV  8/11/2021     IR TRANSVEN INTRAHEPATIC PORTOSYST REV  1/28/2022     KNEE SURGERY Left      KNEE SURGERY Right      RELEASE CARPAL TUNNEL       RELEASE TRIGGER FINGER Right 06/11/2020    Procedure: RELEASE, TRIGGER FINGER, right ring and long finger;  Surgeon: Pascual Valencia MD;  Location: [facility] OR     SIGMOIDOSCOPY FLEXIBLE N/A 10/31/2017    Procedure: SIGMOIDOSCOPY FLEXIBLE;;  Surgeon: Armaan Adams MD;  Location:  GI     TIPS Procedure   2018     TIPS PROCEDURE  2020     ZZC PLASTY KNEE,MED OR LAT COMPARTMT Right 2021    Procedure: RIGHT UNICOMPARTMENTAL ARTHROPLASTY KNEE MEDIAL;  Surgeon: José Miguel Landaverde MD;  Location: [facility] Main OR;  Service: Orthopedics       FAMILY HISTORY:   Family History   Problem Relation Age of Onset     Family History Negative Mother      Family History Negative Father      Hypertension Father      Cerebrovascular Disease Father 87     Breast Cancer Maternal Grandmother      Rheumatoid Arthritis Daughter      Depression Daughter      Substance Abuse Brother      Cancer - colorectal No family hx of      Prostate Cancer No family hx of      Liver Disease No family hx of        SOCIAL HISTORY:   Social History     Tobacco Use     Smoking status: Former Smoker     Packs/day: 0.00     Types: Dip, chew, snus or snuff     Quit date: 1998     Years since quittin.5     Smokeless tobacco: Former User     Types: Chew     Tobacco comment: 1 tin per 10 days.   Substance Use Topics     Alcohol use: No     Alcohol/week: 17.5 standard drinks     Types: 21 Cans of beer per week     Comment: last etoh 16, did have Odouls ~2017       PROBLEM LIST:   Patient Active Problem List    Diagnosis Date Noted     Cervicalgia 2021     Priority: Medium     Acute low back pain 2021     Priority: Medium     Thrombocytopenia (H) 2020     Priority: Medium     CKD (chronic kidney disease) stage 2, GFR 60-89 ml/min 2020     Priority: Medium     Seasonal allergic rhinitis, unspecified trigger 2020     Priority: Medium     Nephrolithiasis 2018     Priority: Medium     2018 non-obstructing, incident finding on us.        Alcoholic cirrhosis of liver with ascites (H) 2016     Priority: Medium     2018 now sober, ascites resolved, S/P TIP procedure 2018. Normal liver enzymes, diminished liver function.  INR 1.6, bilirubin 2.5, albumin 2.6. He   appears healthy. Doing well. Stressed the importance of abstaining from alcohol. See copy of recent us.     9/10/18:  Impression:   1. Patent TIPS with appropriate in stent velocities. Normal Doppler  evaluation of the remainder of the hepatic vasculature in the setting  of TIPS.  2. Cirrhotic hepatic morphology with evidence of portal hypertension,  including splenomegaly. No focal hepatic mass.  3. Cholelithiasis without evidence of cholecystitis.  4. Bilateral nonobstructing nephrolithiasis.            Hypertension goal BP (blood pressure) < 140/90 12/18/2012     Priority: Medium     24 hour contact given to patient  01/02/2012     Priority: Medium     EMERGENCY CARE PLAN  Presenting Problem Signs and Symptoms Treatment Plan    Questions or conerns during clinic hours    I will call the clinic directly     Questions or conerns outside clinic hours    I will call the 24 hour nurse line at [phone]    Patient needs to schedule an appointment    I will call the 24 hour scheduling team at [phone] or clinic directly    Same day treatment     I will call the clinic first, nurse line if after hours, urgent care and express care if needed                                 CARDIOVASCULAR SCREENING; LDL GOAL LESS THAN 130 10/31/2010     Priority: Medium     Erectile dysfunction 01/29/2008     Priority: Medium     September 23, 2018 no known CAD, no contraindications to sildenafil.        Gouty arthropathy 12/31/2006     Priority: Medium     Problem list name updated by automated process. Provider to review and confirm  [employer] Update utility       Hypertriglyceridemia 08/03/2005     Priority: Medium     Last Exam: December 19, 2007  Last Lipids: CHOL      211   01/25/2007 LDL      104   01/25/2007 HDL       83   01/25/2007  Last LFTs:: AST      106   01/25/2007   ALT       53   01/25/2007    TRIG      120   01/25/2007  TRIG      115   01/16/2006  Meds: Lopid 600 bid - tolerating         MEDICATIONS:   Prescription  Medications as of 3/1/2022       Rx Number Disp Refills Start End Last Dispensed Date Next Fill Date Owning Pharmacy    acetaminophen (TYLENOL) 500 MG tablet            Sig: Take 1,000 mg by mouth every 6 hours as needed for mild pain     Class: Historical    Route: Oral    Ascorbic Acid (VITAMIN C PO)        [address]    Sig: Take by mouth daily    Class: Historical    Route: Oral    ciprofloxacin (CIPRO) 500 MG tablet  14 tablet 0 2/3/2022    [facility] [address]    Sig: Take 1 tablet (500 mg) by mouth 2 times daily    Class: E-Prescribe    Route: Oral    eplerenone (INSPRA) 50 MG tablet  270 tablet 3 9/29/2021    [facility] [address]    Sig: Take 2 tablets (100 mg) by mouth every morning AND 1 tablet (50 mg) every evening.    Class: E-Prescribe    Route: Oral    fexofenadine (ALLEGRA) 60 MG tablet  30 tablet 1 4/17/2020    [address]    Sig: Take 1 tablet (60 mg) by mouth daily    Class: E-Prescribe    Notes to Pharmacy: Renal dosing, start with 60 mg daily    Route: Oral    furosemide (LASIX) 20 MG tablet  90 tablet 3 11/9/2021    [facility] [address]    Sig: Take 1 tablet (20 mg) by mouth every evening    Class: E-Prescribe    Route: Oral    furosemide (LASIX) 40 MG tablet  90 tablet 3 11/9/2021    [facility] Hale [address]    Sig: Take 1 tablet (40 mg) by mouth every morning    Class: E-Prescribe    Route: Oral    lactulose (CHRONULAC) 10 GM/15ML solution  2000 mL 11 10/14/2021    [facility] Hale [address]    Sig: TAKE 30MLS BY MOUTH THREE TIMES DAILY AS NEEDED FOR CONSTIPATION (TAKE AS NEEDED TO MAINTAIN 3 TO 4 BOWEL MOVEMENTS DAILY)    Class: E-Prescribe    Menaquinone-7 (VITAMIN K2) 100 MCG CAPS             Sig: Take 200 mcg by mouth daily     Class: Historical    Route: Oral    MULTIPLE VITAMINS PO            Sig: Take 1 tablet by mouth daily    Class: Historical    Route: Oral    omeprazole (PRILOSEC OTC) 20 MG EC tablet  30 tablet 3 2/7/2022 3/9/2022   [facility] Geoffrey Hale [address]    Sig: Take 1 tablet (20 mg) by mouth daily    Class: E-Prescribe    Route: Oral    ondansetron (ZOFRAN-ODT) 4 MG ODT tab  30 tablet 0 2/3/2022    [facility] Hale [address]    Sig: Take 1 tablet (4 mg) by mouth every 8 hours as needed for nausea    Class: E-Prescribe    Route: Oral    potassium chloride ER (KLOR-CON M) 20 MEQ CR tablet  120 tablet 1 1/4/2022    [facility] Hale [address]    Sig: Take 2 tablets (40 mEq) by mouth daily    Class: E-Prescribe    Route: Oral    rifaximin (XIFAXAN) 550 MG TABS tablet  180 tablet 3 10/20/2021    [facility] Geoffrey Hale [address]    Sig: Take 1 tablet (550 mg) by mouth 2 times daily    Class: E-Prescribe    Route: Oral    sertraline (ZOLOFT) 50 MG tablet  45 tablet 3 2/10/2021    [facility] Hale [address]    Sig: TAKE 1/2 A TABLET BY MOUTH ONCE DAILY    Class: E-Prescribe    sildenafil (REVATIO) 20 MG tablet  30 tablet 11 2/11/2021    [facility] Hale [address]    Sig: Take 3-5 tabs 1/2-4 hours to relations    Class: E-Prescribe    sodium bicarbonate 650 MG tablet  180 tablet 3 8/10/2021    [facility] Geoffrey Hale [address]    Sig: Take 1 tablet (650 mg) by mouth 2 times daily    Class: E-Prescribe    Route: Oral    tamsulosin (FLOMAX) 0.4 MG capsule  7 capsule 1 2/3/2022    [facility] Geoffrey Hale [address]    Sig: Take 1 capsule (0.4 mg) by mouth daily    Class: E-Prescribe    Route: Oral    vitamin D3  (CHOLECALCIFEROL) 2000 units (50 mcg) tablet            Sig: Take 1 tablet by mouth daily    Class: Historical    Route: Oral          ALLERGIES:   Prednisone, Trazodone, and Benadryl [diphenhydramine]    ROS:  A brief review of systems was negative as detailed in the HPI.      Physical Examination:   VITALS:   There were no vitals taken for this visit.  A limited virtual physical exam was performed for this phone visit.   General: Awake, alert, NAD  Resp: Breathing comfortably on room air, able to speak in full sentences    Labs:  BMP RESULTS:  Lab Results   Component Value Date     02/02/2022     (H) 06/23/2021    POTASSIUM 3.8 02/02/2022    POTASSIUM 4.0 06/23/2021    CHLORIDE 112 (H) 02/02/2022    CHLORIDE 117 (H) 06/23/2021    CO2 23 02/02/2022    CO2 25 06/23/2021    ANIONGAP 6 02/02/2022    ANIONGAP 6 06/23/2021    GLC 95 02/02/2022    GLC 77 06/23/2021    BUN 18 02/02/2022    BUN 8 06/23/2021    CR 1.25 02/03/2022    CR 1.09 06/23/2021    GFRESTIMATED 67 02/03/2022    GFRESTIMATED 75 06/23/2021    GFRESTBLACK 86 06/23/2021    JULISSA 8.5 02/02/2022    JULISSA 8.5 06/23/2021        CBC RESULTS:  Lab Results   Component Value Date    WBC 5.0 02/02/2022    WBC 4.3 06/23/2021    RBC 3.34 (L) 02/02/2022    RBC 3.78 (L) 06/23/2021    HGB 10.9 (L) 02/02/2022    HGB 12.1 (L) 06/23/2021    HCT 32.8 (L) 02/02/2022    HCT 37.3 (L) 06/23/2021    MCV 98 02/02/2022    MCV 99 06/23/2021    MCH 32.6 02/02/2022    MCH 32.0 06/23/2021    MCHC 33.2 02/02/2022    MCHC 32.4 06/23/2021    RDW 18.3 (H) 02/02/2022    RDW 18.6 (H) 06/23/2021    PLT 63 (L) 02/02/2022    PLT 58 (L) 06/23/2021       INR/PTT:  Lab Results   Component Value Date    INR 1.65 (H) 02/02/2022    INR 1.79 (H) 06/23/2021    PTT 41 (H) 02/02/2022    PTT 44 (H) 10/29/2019       Diagnostic studies:   ULTRASOUND TIPS DOPPLER 2/16/2022 7:20 AM  IMPRESSION:   1. Elevated velocity at the hepatic end of the TIPS to 299 cm/s  suggests recurrent stenosis.  2.  Persistent large volume ascites.  3. Right and left portal veins remain retrograde in flow.  4. Segment 4 LI-RADS 3 lesion not demonstrated in this study.    US ABDOMEN COMPLETE WITH TIPSS DOPPLER 2/2/2022 4:52 PM   There is flow towards the liver in the main portal vein:  37 cm/sec in the main portal vein  Retrograde at 15 cm/sec in the right portal vein  Retrograde  at 17 cm/sec in the left portal vein     The stent velocities are  131 cm/sec at the portal vein  174 cm/sec in mid stent  147 cm/sec in the right hepatic vein distal shunt end.      The splenic vein is patent and flow is towards the liver.  The left,  middle, and right hepatic veins are patent with flow towards the IVC.  The IVC is patent with flow towards the heart.   The visualized aorta  is not dilated.    Assessment Ivan Bell is a 56 year old with history of cirrhosis 2/2 EtOH s/p esophageal variceal banding, TIPS placement 6/2018 and multiple TIPS revisions d/t recurrent ascites, most recently 1/28/22, who returns to clinic for 1 month follow up. Unfortunately his ascites has not yet been relieved and he continues to have abdominal pain. US is also consistent with a possible early stent stenosis with elevated hepatic vein stent velocities compared to US from 2/2, although flow remains retrograde through the portal veins. It is possible that he will need an additional stent with another TIPS revision if his ascites does not improve within the next few months. Discussed importance of follow-up with Dr. Bales for abdominal pain, possibly concerning for abdominal hernia. Pt is amenable to the plan. We will continue to follow.    Plan   - F/u IR clinic with repeat abd US in April   - At that appt, if ascites has not improved, consider new stent at hepatic end of TIPS and TIPS revision  - Encouraged follow-up with Dr. Bales regarding abdominal pain      CC  Patient Care Team:  Too Washington DO as PCP - General (Family Practice)  Devin Diego,  MD as MD (Family Practice)  Gonzalo Wahl MD as MD (Gastroenterology)  Jelani Tristan MD as MD (Radiology)  Vaishali Goff, RN as Specialty Care Coordinator (Hepatology)  Gonzalo Wahl MD as Assigned Gastroenterology Provider  Cristopher Arriaga PA-C as Assigned PCP  Bob Parker PA-C as Assigned Neuroscience Provider  Dylan Galaviz MD as MD (Urology)  Ngoc Buckner MD as Assigned Surgical Provider  SELF, REFERRED    Flaca Austin, MS3  [employer]  March 1, 2022      I have visited this patient virtually together with the medical student. I have reviewed, edited the note above and approves it.

## 2022-03-02 ENCOUNTER — OFFICE VISIT (OUTPATIENT)
Dept: INFUSION THERAPY | Facility: CLINIC | Age: 57
End: 2022-03-02
Attending: INTERNAL MEDICINE
Payer: COMMERCIAL

## 2022-03-02 ENCOUNTER — ANCILLARY PROCEDURE (OUTPATIENT)
Dept: ULTRASOUND IMAGING | Facility: CLINIC | Age: 57
End: 2022-03-02
Attending: INTERNAL MEDICINE
Payer: COMMERCIAL

## 2022-03-02 VITALS
HEART RATE: 71 BPM | OXYGEN SATURATION: 99 % | RESPIRATION RATE: 16 BRPM | WEIGHT: 164.5 LBS | TEMPERATURE: 97.7 F | SYSTOLIC BLOOD PRESSURE: 149 MMHG | BODY MASS INDEX: 23.6 KG/M2 | DIASTOLIC BLOOD PRESSURE: 76 MMHG

## 2022-03-02 DIAGNOSIS — K70.31 ALCOHOLIC CIRRHOSIS OF LIVER WITH ASCITES (H): Primary | ICD-10-CM

## 2022-03-02 DIAGNOSIS — Z11.59 ENCOUNTER FOR SCREENING FOR OTHER VIRAL DISEASES: ICD-10-CM

## 2022-03-02 PROCEDURE — 250N000011 HC RX IP 250 OP 636: Performed by: INTERNAL MEDICINE

## 2022-03-02 PROCEDURE — 250N000009 HC RX 250: Performed by: INTERNAL MEDICINE

## 2022-03-02 PROCEDURE — U0003 INFECTIOUS AGENT DETECTION BY NUCLEIC ACID (DNA OR RNA); SEVERE ACUTE RESPIRATORY SYNDROME CORONAVIRUS 2 (SARS-COV-2) (CORONAVIRUS DISEASE [COVID-19]), AMPLIFIED PROBE TECHNIQUE, MAKING USE OF HIGH THROUGHPUT TECHNOLOGIES AS DESCRIBED BY CMS-2020-01-R: HCPCS

## 2022-03-02 PROCEDURE — P9047 ALBUMIN (HUMAN), 25%, 50ML: HCPCS | Performed by: INTERNAL MEDICINE

## 2022-03-02 PROCEDURE — 272N000706 US PARACENTESIS

## 2022-03-02 PROCEDURE — 49083 ABD PARACENTESIS W/IMAGING: CPT | Performed by: RADIOLOGY

## 2022-03-02 PROCEDURE — 999N000127 HC STATISTIC PERIPHERAL IV START W US GUIDANCE

## 2022-03-02 RX ORDER — LIDOCAINE HYDROCHLORIDE 10 MG/ML
20 INJECTION, SOLUTION EPIDURAL; INFILTRATION; INTRACAUDAL; PERINEURAL ONCE
Status: COMPLETED | OUTPATIENT
Start: 2022-03-02 | End: 2022-03-02

## 2022-03-02 RX ORDER — METHYLPREDNISOLONE SODIUM SUCCINATE 125 MG/2ML
125 INJECTION, POWDER, LYOPHILIZED, FOR SOLUTION INTRAMUSCULAR; INTRAVENOUS
Status: CANCELLED
Start: 2022-03-02

## 2022-03-02 RX ORDER — NALOXONE HYDROCHLORIDE 0.4 MG/ML
0.2 INJECTION, SOLUTION INTRAMUSCULAR; INTRAVENOUS; SUBCUTANEOUS
Status: CANCELLED | OUTPATIENT
Start: 2022-03-02

## 2022-03-02 RX ORDER — EPINEPHRINE 1 MG/ML
0.3 INJECTION, SOLUTION INTRAMUSCULAR; SUBCUTANEOUS EVERY 5 MIN PRN
Status: CANCELLED | OUTPATIENT
Start: 2022-03-02

## 2022-03-02 RX ORDER — ALBUTEROL SULFATE 0.83 MG/ML
2.5 SOLUTION RESPIRATORY (INHALATION)
Status: CANCELLED | OUTPATIENT
Start: 2022-03-02

## 2022-03-02 RX ORDER — HEPARIN SODIUM (PORCINE) LOCK FLUSH IV SOLN 100 UNIT/ML 100 UNIT/ML
5 SOLUTION INTRAVENOUS
Status: CANCELLED | OUTPATIENT
Start: 2022-03-02

## 2022-03-02 RX ORDER — ALBUTEROL SULFATE 90 UG/1
1-2 AEROSOL, METERED RESPIRATORY (INHALATION)
Status: CANCELLED
Start: 2022-03-02

## 2022-03-02 RX ORDER — MEPERIDINE HYDROCHLORIDE 25 MG/ML
25 INJECTION INTRAMUSCULAR; INTRAVENOUS; SUBCUTANEOUS EVERY 30 MIN PRN
Status: CANCELLED | OUTPATIENT
Start: 2022-03-02

## 2022-03-02 RX ORDER — LIDOCAINE HYDROCHLORIDE 10 MG/ML
20 INJECTION, SOLUTION EPIDURAL; INFILTRATION; INTRACAUDAL; PERINEURAL ONCE
Status: CANCELLED | OUTPATIENT
Start: 2022-03-02 | End: 2022-03-02

## 2022-03-02 RX ORDER — ALBUMIN (HUMAN) 12.5 G/50ML
12.5 SOLUTION INTRAVENOUS
Status: CANCELLED | OUTPATIENT
Start: 2022-03-02

## 2022-03-02 RX ORDER — ALBUMIN (HUMAN) 12.5 G/50ML
12.5 SOLUTION INTRAVENOUS
Status: DISCONTINUED | OUTPATIENT
Start: 2022-03-02 | End: 2022-03-02 | Stop reason: HOSPADM

## 2022-03-02 RX ORDER — DIPHENHYDRAMINE HYDROCHLORIDE 50 MG/ML
50 INJECTION INTRAMUSCULAR; INTRAVENOUS
Status: CANCELLED
Start: 2022-03-02

## 2022-03-02 RX ORDER — HEPARIN SODIUM,PORCINE 10 UNIT/ML
5 VIAL (ML) INTRAVENOUS
Status: CANCELLED | OUTPATIENT
Start: 2022-03-02

## 2022-03-02 RX ADMIN — LIDOCAINE HYDROCHLORIDE 10 ML: 10 INJECTION, SOLUTION EPIDURAL; INFILTRATION; INTRACAUDAL; PERINEURAL at 14:08

## 2022-03-02 RX ADMIN — ALBUMIN HUMAN 12.5 G: 0.25 SOLUTION INTRAVENOUS at 14:30

## 2022-03-02 ASSESSMENT — PAIN SCALES - GENERAL: PAINLEVEL: MODERATE PAIN (4)

## 2022-03-02 NOTE — LETTER
3/2/2022         RE: Ivan Bell  [id number] [facility] [address]        Dear Colleague,    Thank you for referring your patient, Ivan Bell, to the [facility] T[facility]. Please see a copy of my visit note below.    Paracentesis Nursing Note  Ivan Bell [facility].    During today's appointment orders from Dr.Lim Francisco Javier Miramontes were completed.    Progress Note:  Patient identification verified by name and date of birth.  Assessment completed.  Vitals monitored throughout appointment and recorded in Doc Flowsheets.  See proceduralist note in ultrasound.    Vascular Access: peripheral IV was placed by vascular access nurse.  Labs: were not ordered for this appointment.    Date of consent or authorization: 12/27/21.  Invasive Procedure Safety Checklist was completed and sent for scanning.     Paracentesis performed by dr. garay.    The following labs were communicated to provider performing paracentesis:  Lab Results   Component Value Date    PLT 63 02/02/2022    PLT 58 06/23/2021       Total amount of ascites fluid drained: 4.9 liters.  Color of ascites fluid: straw colored.  Total amount of albumin given: 12.5  grams.    Patient tolerated procedure well.    Post procedure,denies pain or discomfort post paracentesis.    Asymptomatic COVID test date: today (If next appt is within 2 weeks, COVID test to be done in [facility])      Discharge Plan:  Discharge instructions were reviewed with patient.  Patient/Representative verbalized understanding and all questions were answered.   Discharged from [facility] in stable condition.    Rianna Chicas RN       Administrations This Visit     albumin human 25 % injection 12.5 g     Admin Date  03/02/2022 Action  New Bag Dose  12.5 g Route  Intravenous Administered By  Rianna Chicas RN          lidocaine (PF) (XYLOCAINE) 1 %  injection 20 mL     Admin Date  03/02/2022 Action  Given Dose  10 mL Route  Subcutaneous Administered By  Rianna Chicas, RN                Temp 97.7  F (36.5  C) (Oral)   Resp 16   SpO2 99%         Again, thank you for allowing me to participate in the care of your patient.      Sincerely,    [facility] Par[facility]esis Provider

## 2022-03-02 NOTE — PROGRESS NOTES
Paracentesis Nursing Note  Ivan Bell presents today to [facility] for a [facility].    During today's appointment orders from Dr.Lim Francisco Javier Miramontes were completed.    Progress Note:  Patient identification verified by name and date of birth.  Assessment completed.  Vitals monitored throughout appointment and recorded in Doc Flowsheets.  See proceduralist note in ultrasound.    Vascular Access: peripheral IV was placed by vascular access nurse.  Labs: were not ordered for this appointment.    Date of consent or authorization: 12/27/21.  Invasive Procedure Safety Checklist was completed and sent for scanning.     Paracentesis performed by dr. garay.    The following labs were communicated to provider performing paracentesis:  Lab Results   Component Value Date    PLT 63 02/02/2022    PLT 58 06/23/2021       Total amount of ascites fluid drained: 4.9 liters.  Color of ascites fluid: straw colored.  Total amount of albumin given: 12.5  grams.    Patient tolerated procedure well.    Post procedure,denies pain or discomfort post paracentesis.    Asymptomatic COVID test date: today (If next appt is within 2 weeks, COVID test to be done in [facility])      Discharge Plan:  Discharge instructions were reviewed with patient.  Patient/Representative verbalized understanding and all questions were answered.   Discharged from [facility] in stable condition.    Rianna Chicas RN       Administrations This Visit     albumin human 25 % injection 12.5 g     Admin Date  03/02/2022 Action  New Bag Dose  12.5 g Route  Intravenous Administered By  Rianna Chicas RN          lidocaine (PF) (XYLOCAINE) 1 % injection 20 mL     Admin Date  03/02/2022 Action  Given Dose  10 mL Route  Subcutaneous Administered By  Rianna Chicas RN                Temp 97.7  F (36.5  C) (Oral)   Resp 16   SpO2 99%

## 2022-03-02 NOTE — PATIENT INSTRUCTIONS
Dear Ivan Bell    Thank you for choosing [facility] ([facility]) for your paracentesis.  The following information is a summary of our appointment as well as important reminders.      Patient Education     Discharge Instructions for Paracentesis  Paracentesis is a procedure to remove extra fluid from your belly (abdomen). This fluid buildup in the abdomen is called ascites. The procedure may have been done to take a sample of the fluid. Or, it may have been done to drain the extra fluid from your abdomen and help make you more comfortable.      Ascites is buildup of excess fluid in the abdomen.   Home care    If you have pain after the procedure, your healthcare provider can prescribe or recommend pain medicines. Take these exactly as directed. If you stopped taking other medicines before the procedure, ask your provider when you can start them again.    Take it easy for 24 hours after the procedure. Don't do any physical activity until your provider says it s OK.    You will have a small bandage over the puncture site. Stitches, surgical staples, adhesive tapes, adhesive strips, or surgical glue may be used to close the incision. They also help stop bleeding and speed healing. You may take the bandage off in 24 hours.    Check the puncture site for the signs of infection listed below.    Follow-up care  Make a follow-up appointment with your healthcare provider as directed. During your follow-up visit, your provider will check your healing. Let your provider know how you are feeling. You can also discuss the cause of your ascites and if you need any further treatment. If your fluid is infected, you will be sent home on antibiotics. In some cases, the paracentesis may need to be repeated if the fluid returns. Your provider may also prescribe medicines that increase urination (diuretics) to decrease the buildup of fluid.   When to call your healthcare  provider  Call your healthcare provider if you have any of the following after the procedure:    A fever of 100.4  F ( 38.0 C) or higher, or as directed by your provider    Chills    Trouble breathing    Pain that doesn't go away even after taking pain medicine    Belly pain not caused by having the skin punctured    Bleeding from the puncture site    More than a small amount of fluid leaking from the puncture site    Swollen belly    Signs of infection at the puncture site. These include increased pain, redness, or swelling, warmth, or bad-smelling drainage.    Blood in your urine    Feeling dizzy or lightheaded, or fainting  [employer] last reviewed this educational content on 10/1/2019    4364-1302 [employer]. All rights reserved. This information is not intended as a substitute for professional medical care. Always follow your healthcare professional's instructions.             We look forward in seeing you on your next appointment here at [facility] ([facility]).  Please don t hesitate to call us at [phone] to reschedule any of your appointments or to speak with one of the [facility] registered nurses.  It was a pleasure taking care of you today.    Sincerely,    [facility]  [facility]  [address] [place] [address]  [id number]  Phone:  [phone]

## 2022-03-03 LAB — SARS-COV-2 RNA RESP QL NAA+PROBE: NEGATIVE

## 2022-03-07 ENCOUNTER — LAB (OUTPATIENT)
Dept: LAB | Facility: CLINIC | Age: 57
End: 2022-03-07
Attending: INTERNAL MEDICINE
Payer: COMMERCIAL

## 2022-03-07 ENCOUNTER — OFFICE VISIT (OUTPATIENT)
Dept: GASTROENTEROLOGY | Facility: CLINIC | Age: 57
End: 2022-03-07
Attending: PHYSICAL MEDICINE & REHABILITATION
Payer: COMMERCIAL

## 2022-03-07 VITALS
SYSTOLIC BLOOD PRESSURE: 139 MMHG | HEART RATE: 77 BPM | OXYGEN SATURATION: 100 % | DIASTOLIC BLOOD PRESSURE: 75 MMHG | BODY MASS INDEX: 24.74 KG/M2 | WEIGHT: 172.4 LBS

## 2022-03-07 DIAGNOSIS — K42.9 UMBILICAL HERNIA WITHOUT OBSTRUCTION AND WITHOUT GANGRENE: Primary | ICD-10-CM

## 2022-03-07 DIAGNOSIS — K70.31 ALCOHOLIC CIRRHOSIS OF LIVER WITH ASCITES (H): ICD-10-CM

## 2022-03-07 DIAGNOSIS — L29.9 ITCHING: ICD-10-CM

## 2022-03-07 LAB
ALBUMIN SERPL-MCNC: 2.5 G/DL (ref 3.4–5)
ALP SERPL-CCNC: 151 U/L (ref 40–150)
ALT SERPL W P-5'-P-CCNC: 18 U/L (ref 0–70)
ANION GAP SERPL CALCULATED.3IONS-SCNC: 9 MMOL/L (ref 3–14)
AST SERPL W P-5'-P-CCNC: 38 U/L (ref 0–45)
BILIRUB DIRECT SERPL-MCNC: 1.3 MG/DL (ref 0–0.2)
BILIRUB SERPL-MCNC: 3.1 MG/DL (ref 0.2–1.3)
BUN SERPL-MCNC: 11 MG/DL (ref 7–30)
CALCIUM SERPL-MCNC: 8.3 MG/DL (ref 8.5–10.1)
CHLORIDE BLD-SCNC: 113 MMOL/L (ref 94–109)
CO2 SERPL-SCNC: 22 MMOL/L (ref 20–32)
CREAT SERPL-MCNC: 1.2 MG/DL (ref 0.66–1.25)
ERYTHROCYTE [DISTWIDTH] IN BLOOD BY AUTOMATED COUNT: 16.3 % (ref 10–15)
GFR SERPL CREATININE-BSD FRML MDRD: 70 ML/MIN/1.73M2
GLUCOSE BLD-MCNC: 90 MG/DL (ref 70–99)
HCT VFR BLD AUTO: 33.6 % (ref 40–53)
HGB BLD-MCNC: 11.2 G/DL (ref 13.3–17.7)
INR PPP: 1.81 (ref 0.85–1.15)
MCH RBC QN AUTO: 31.5 PG (ref 26.5–33)
MCHC RBC AUTO-ENTMCNC: 33.3 G/DL (ref 31.5–36.5)
MCV RBC AUTO: 95 FL (ref 78–100)
PLATELET # BLD AUTO: 67 10E3/UL (ref 150–450)
POTASSIUM BLD-SCNC: 3.3 MMOL/L (ref 3.4–5.3)
PROT SERPL-MCNC: 5.3 G/DL (ref 6.8–8.8)
RBC # BLD AUTO: 3.55 10E6/UL (ref 4.4–5.9)
SODIUM SERPL-SCNC: 144 MMOL/L (ref 133–144)
WBC # BLD AUTO: 4.5 10E3/UL (ref 4–11)

## 2022-03-07 PROCEDURE — 85027 COMPLETE CBC AUTOMATED: CPT | Performed by: PATHOLOGY

## 2022-03-07 PROCEDURE — 80053 COMPREHEN METABOLIC PANEL: CPT | Performed by: PATHOLOGY

## 2022-03-07 PROCEDURE — 82248 BILIRUBIN DIRECT: CPT | Performed by: PATHOLOGY

## 2022-03-07 PROCEDURE — 36415 COLL VENOUS BLD VENIPUNCTURE: CPT | Performed by: PATHOLOGY

## 2022-03-07 PROCEDURE — 99215 OFFICE O/P EST HI 40 MIN: CPT | Performed by: INTERNAL MEDICINE

## 2022-03-07 PROCEDURE — 85610 PROTHROMBIN TIME: CPT | Performed by: PATHOLOGY

## 2022-03-07 RX ORDER — EPLERENONE 50 MG/1
100 TABLET, FILM COATED ORAL 2 TIMES DAILY
Qty: 340 TABLET | Refills: 3 | Status: ON HOLD | OUTPATIENT
Start: 2022-03-07 | End: 2023-04-24

## 2022-03-07 NOTE — PROGRESS NOTES
[facility] Hepatology    Follow-up Visit    Follow-up visit for cirrhosis    Subjective:  56 year old male    Cirrhosis  - ETOH  - hx ascites, TIPS placement 5/14/18, 6/6/18; TIPS revision 10/29/19, 11/25/2020, 8/11/2021, 1/28/2022  - hx HE  - hx variceal bleed Oct 2017  - ETOH hepatitis March 2019  - last EGD Jun 2019- diminutive EV, mild portal hypertensive gastropathy  - HCC screening- MRI liver Sep 2021    Last visit 07/2021.  The patient underwent a TIPS revision with IR on 01/28/2022.  He presented to his local ER with abdominal pain 02/02/2022.  He saw general surgery (locally) soon after for abdominal pain, who referred him for EGD.  No new medications or hospital admissions since last clinic visit.    The patient is well today.  His main complaint is abdominal pain around his umbilical hernia.  This is worse when his ascites is higher and is worsened with movement.  There is no relationship to eating or bowel movements.  The patient denies any nausea, vomiting or loss of flatus when he is having his episodes of pain.    The patient is currently undergoing therapeutic paracentesis every 2 weeks.  He is now requiring 5-6 liters to be removed each time.  Ascites volumes appeared to be increasing over time.  He saw interventional radiology who plan to review him next month and decide if insertion of a second TIPS will be necessary.  No lower extremity edema.  The patient is adhering to a low-sodium diet and diuretics.    The patient denies jaundice, lethargy or confusion.  He is taking lactulose and rifaximin as prescribed.    The patient denies melena, hematemesis or hematochezia.    The patient denies fever, sweats or chills.  He is fully vaccinated and boosted against COVID-19.    Weight is stable.  Appetite is okay, but worse during episodes of abdominal pain.    The patient does not drink alcohol.  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        Medical hx Surgical hx   Past Medical History:   Diagnosis Date     Alcoholic cirrhosis of liver (H)      Alcoholic hepatitis 03/2019     Chronic kidney disease     CRF     Depression      Gout      History of transfusion      Hypertension      Hypertension      Hypertriglyceridemia      Left calcaneus fracture 01/09/2006 January 16, 2006: Fell 10 feet from ladder onto left foot on frozen ground on 1/9/06 at home.  Immediate pain and unable to walk- seen at [place] and diagnosed with calcaneus fracture     Nephrolithiasis      Osteoarthritis      Portal vein thrombosis     left occlusion, partial main      Past Surgical History:   Procedure Laterality Date     ANKLE SURGERY Left      ANKLE SURGERY       ARTHROSCOPY KNEE       ARTHROSCOPY SHOULDER       ARTHROSCOPY SHOULDER ROTATOR CUFF REPAIR       COLONOSCOPY N/A 03/31/2016    Procedure: COLONOSCOPY;  Surgeon: Rhys Uriostegui MD;  Location:  GI     ESOPHAGOSCOPY, GASTROSCOPY, DUODENOSCOPY (EGD), COMBINED N/A 03/31/2016    Procedure: COMBINED ESOPHAGOSCOPY, GASTROSCOPY, DUODENOSCOPY (EGD);  Surgeon: Rhys Uriostegui MD;  Location:  GI     ESOPHAGOSCOPY, GASTROSCOPY, DUODENOSCOPY (EGD), COMBINED N/A 03/09/2018    Procedure: COMBINED ESOPHAGOSCOPY, GASTROSCOPY, DUODENOSCOPY (EGD), BIOPSY SINGLE OR MULTIPLE;  EGD;  Surgeon: Gonzalo Wahl MD;  Location:  GI     ESOPHAGOSCOPY, GASTROSCOPY, DUODENOSCOPY (EGD), COMBINED N/A 06/07/2019    Procedure: ESOPHAGOGASTRODUODENOSCOPY (EGD);  Surgeon: Gonzalo Wahl MD;  Location:  GI     FOOT SURGERY       HIP SURGERY       IR PARACENTESIS  10/29/2019     IR PARACENTESIS  11/25/2020     IR PARACENTESIS  8/11/2021     IR PARACENTESIS  1/28/2022     IR TRANSVEN INTRAHEPATIC PORTOSYST REV  10/29/2019     IR TRANSVEN INTRAHEPATIC PORTOSYST REV  11/25/2020     IR TRANSVEN INTRAHEPATIC PORTOSYST REV  8/11/2021     IR  TRANSVEN INTRAHEPATIC PORTOSYST REV  1/28/2022     KNEE SURGERY Left      KNEE SURGERY Right      RELEASE CARPAL TUNNEL       RELEASE TRIGGER FINGER Right 06/11/2020    Procedure: RELEASE, TRIGGER FINGER, right ring and long finger;  Surgeon: Pascual Valencia MD;  Location: [facility] OR     SIGMOIDOSCOPY FLEXIBLE N/A 10/31/2017    Procedure: SIGMOIDOSCOPY FLEXIBLE;;  Surgeon: Armaan Adams MD;  Location: [facility] GI     TIPS Procedure  06/06/2018     TIPS PROCEDURE  11/01/2020     ZZC PLASTY KNEE,MED OR LAT COMPARTMT Right 02/19/2021    Procedure: RIGHT UNICOMPARTMENTAL ARTHROPLASTY KNEE MEDIAL;  Surgeon: José Miguel Landaverde MD;  Location: [facility];  Service: Orthopedics          Medications  Prior to Admission medications    Medication Sig Start Date End Date Taking? Authorizing Provider   acetaminophen (TYLENOL) 500 MG tablet Take 1,000 mg by mouth every 6 hours as needed for mild pain    Yes Reported, Patient   Ascorbic Acid (VITAMIN C PO) Take by mouth daily   Yes Reported, Patient   ciprofloxacin (CIPRO) 500 MG tablet Take 1 tablet (500 mg) by mouth 2 times daily 2/3/22  Yes Jenifer Forde APRN CNP   eplerenone (INSPRA) 50 MG tablet Take 2 tablets (100 mg) by mouth every morning AND 1 tablet (50 mg) every evening. 9/29/21  Yes Celestino Verduzco PA-C   fexofenadine (ALLEGRA) 60 MG tablet Take 1 tablet (60 mg) by mouth daily 4/17/20  Yes Citlali Morgan PA-C   furosemide (LASIX) 20 MG tablet Take 1 tablet (20 mg) by mouth every evening 11/9/21  Yes Gonzalo Wahl MD   furosemide (LASIX) 40 MG tablet Take 1 tablet (40 mg) by mouth every morning 11/9/21  Yes Gonzalo Wahl MD   lactulose (CHRONULAC) 10 GM/15ML solution TAKE 30MLS BY MOUTH THREE TIMES DAILY AS NEEDED FOR CONSTIPATION (TAKE AS NEEDED TO MAINTAIN 3 TO 4 BOWEL MOVEMENTS DAILY) 10/14/21  Yes Gonzalo Wahl MD   Menaquinone-7 (VITAMIN K2) 100 MCG CAPS Take 200 mcg by mouth daily    Yes Reported, Patient    MULTIPLE VITAMINS PO Take 1 tablet by mouth daily   Yes Reported, Patient   omeprazole (PRILOSEC OTC) 20 MG EC tablet Take 1 tablet (20 mg) by mouth daily 2/7/22 3/9/22 Yes Ngoc Buckner MD   ondansetron (ZOFRAN-ODT) 4 MG ODT tab Take 1 tablet (4 mg) by mouth every 8 hours as needed for nausea 2/3/22  Yes Jenifer Forde APRN CNP   potassium chloride ER (KLOR-CON M) 20 MEQ CR tablet Take 2 tablets (40 mEq) by mouth daily 1/4/22  Yes Celestino Verduzco PA-C   rifaximin (XIFAXAN) 550 MG TABS tablet Take 1 tablet (550 mg) by mouth 2 times daily 10/20/21  Yes Celestino Verduzco PA-C   sertraline (ZOLOFT) 50 MG tablet TAKE 1/2 A TABLET BY MOUTH ONCE DAILY 2/10/21  Yes Gonzalo Wahl MD   sildenafil (REVATIO) 20 MG tablet Take 3-5 tabs 1/2-4 hours to relations 2/11/21  Yes Cristopher Arriaga PA-C   sodium bicarbonate 650 MG tablet Take 1 tablet (650 mg) by mouth 2 times daily 8/10/21  Yes Alma Moses MD   tamsulosin (FLOMAX) 0.4 MG capsule Take 1 capsule (0.4 mg) by mouth daily 2/3/22  Yes Jenifer Forde APRN CNP   vitamin D3 (CHOLECALCIFEROL) 2000 units (50 mcg) tablet Take 1 tablet by mouth daily   Yes Unknown, Entered By History       Allergies  Allergies   Allergen Reactions     Prednisone Visual Disturbance     Trazodone Visual Disturbance     Benadryl [Diphenhydramine] Other (See Comments)     Delirium (visual and auditory hallucinations)       Review of systems  A 10-point review of systems was negative    Examination  /75   Pulse 77   Wt 78.2 kg (172 lb 6.4 oz)   SpO2 100%   BMI 24.74 kg/m    Body mass index is 24.74 kg/m .    Gen- well, NAD, A+Ox3, normal color  CVS- RRR  RS- CTA  Abd- distended, soft, non-tender, small reducible umbilical hernia, dull to percuss, BS+  Extr- hands normal, no BESSY  Skin- no rash or jaundice  Neuro- no asterixis  Psych- normal mood    Laboratory  Lab Results   Component Value Date     03/07/2022      06/23/2021    POTASSIUM 3.3 03/07/2022    POTASSIUM 4.0 06/23/2021    CHLORIDE 113 03/07/2022    CHLORIDE 117 06/23/2021    CO2 22 03/07/2022    CO2 25 06/23/2021    BUN 11 03/07/2022    BUN 8 06/23/2021    CR 1.20 03/07/2022    CR 1.09 06/23/2021       Lab Results   Component Value Date    BILITOTAL 3.1 03/07/2022    BILITOTAL 3.6 06/23/2021    ALT 18 03/07/2022    ALT 19 06/23/2021    AST 38 03/07/2022    AST 33 06/23/2021    ALKPHOS 151 03/07/2022    ALKPHOS 165 06/23/2021       Lab Results   Component Value Date    ALBUMIN 2.5 03/07/2022    ALBUMIN 2.7 06/23/2021    PROTTOTAL 5.3 03/07/2022    PROTTOTAL 5.6 06/23/2021        Lab Results   Component Value Date    WBC 4.5 03/07/2022    WBC 4.3 06/23/2021    HGB 11.2 03/07/2022    HGB 12.1 06/23/2021    MCV 95 03/07/2022    MCV 99 06/23/2021    PLT 67 03/07/2022    PLT 58 06/23/2021       Lab Results   Component Value Date    INR 1.81 03/07/2022    INR 1.79 06/23/2021       Radiology  Liver TIPS doppler U/S 2/16/2022 reviewed  IR TIPS revision 1/28/2022 reviewed    Assessment  A 56-year-old male who presents for followup of decompensated alcoholic complicated with ascites and hepatic encephalopathy who presents for follow-up.  MELD-Na= 19 (stable).  Last ETOH= Feb 2019.  Ascites burden appears to be worsening at site of TIPS revision in 01/2022.  Will contact interventional radiology for consideration of second TIPS insertion.  No evidence of hepatic encephalopathy.  Will refer to general surgery (Dr. Tripp) for surgical intervention of symptomatic umbilical hernia as this is significantly impacting on the patient's quality of life and ability to work.  Will also begin liver transplant evaluation should the patient decompensate after surgery.  Up to date with HCC screening.    We talked about surgical intervention of umbilical hernia; complications of the context of chronic liver disease and the liver transplant evaluation process.    Plan  1.  Increase eplerenone  to 100mg BID  2.  Continue furosemide 60mg PO Q24  3.  Low Na diet  4.  Discuss with IR re new TIPS placement  5.  General surgery referral (Dr Tripp)  6.  Start liver transplant evaluation  7.  Continue lactulose and rifaximin  8.  Follow-up in 3 months    Gonzalo Bales MD  Hepatology  [facility]    I spent 40 minutes on the date of the encounter doing chart review, history and exam, documentation and further activities as noted above.

## 2022-03-07 NOTE — NURSING NOTE
Chief Complaint   Patient presents with     RECHECK     3 month follow up     Blood pressure 139/75, pulse 77, weight 78.2 kg (172 lb 6.4 oz), SpO2 100 %.    Eufemia Vick on 3/7/2022 at 8:06 AM

## 2022-03-07 NOTE — PATIENT INSTRUCTIONS
Plan  1.  Increase Inspra to 100mg twice per day  2.  Continue Lasix at same dose  3.  I will send a message to the radiologist about TIPS revision  4.  Surgery consult with Dr Tripp regarding your hernia  5.  We will start the liver transplant evaluation process- please try to set up a visit with behavioral health- this will move things along faster  6.  Follow-up in 3 months    Gonzalo Bales MD  Hepatology

## 2022-03-08 ENCOUNTER — REFERRAL (OUTPATIENT)
Dept: TRANSPLANT | Facility: CLINIC | Age: 57
End: 2022-03-08

## 2022-03-08 NOTE — LETTER
July 12, 2022        Ivan Bell  [address] [facility] [address]      Dear Ivan,       You have recently expressed interest in our Solid Organ Transplant Program and have requested to begin the evaluation process.  We have made several attempts to get in touch with you but have been unable to reach you.    If you are still interested and/or have any questions, please contact us at  [phone] or [phone] and ask to speak with the .      Monday - Friday, between the hours of 8:00am and 5:00pm central time.    We look forward to hearing from you.      Regards,     Solid Organ Transplant Intake Team  [address]  [address], [facility] [address]  [place] [address]

## 2022-03-08 NOTE — TELEPHONE ENCOUNTER
REFERRAL INFORMATION:    Referring Provider:  Dr. Nii Mcintyre    Referring Clinic:  [facility] Hepatology     Reason for Visit/Diagnosis: Umbilical hernia        FUTURE VISIT INFORMATION:    Appointment Date: 3/14/2022    Appointment Time: 8 AM      NOTES RECORD STATUS  DETAILS   OFFICE NOTE from Referring Provider Internal 3/7/2022 Office visit with Dr. Nii Mcintyre Kulwinder      OFFICE NOTE from Other Specialists N/A    HOSPITAL DISCHARGE SUMMARY/ ED VISITS  N/A    OPERATIVE REPORT N/A    ENDOSCOPY (EGD)  Internal  6/4/19, 4/13/18, 3/9/18, 1/19/18, more in EPIC   PERTINENT LABS Internal    PATHOLOGY REPORTS (RELATED) N/A    IMAGING (CT, MRI, US, XR)  Internal CT Abdomen Pelvis: 2/2/2022  US Abdomen: 2/2/2022

## 2022-03-08 NOTE — LETTER
March 15, 2022        Ivan eBll  [address] [facility] [address]      Dear Ivan.        You have recently expressed interest in our Solid Organ Transplant Program and have requested to begin the evaluation process.  We have made several attempts to get in touch with you but have been unable to reach you.    If you are still interested and/or have any questions, please contact us at  [phone] or [phone] and ask to speak with the .      Monday - Friday, between the hours of 8:00am and 5:00pm central time.    We look forward to hearing from you.      Regards,     Solid Organ Transplant Intake Team  [address]  [address], [facility] [address]  [place] [address]

## 2022-03-10 ENCOUNTER — TELEPHONE (OUTPATIENT)
Dept: VASCULAR SURGERY | Facility: CLINIC | Age: 57
End: 2022-03-10
Payer: COMMERCIAL

## 2022-03-10 DIAGNOSIS — Z11.59 ENCOUNTER FOR SCREENING FOR OTHER VIRAL DISEASES: Primary | ICD-10-CM

## 2022-03-10 DIAGNOSIS — K70.31 ALCOHOLIC CIRRHOSIS OF LIVER WITH ASCITES (H): Primary | ICD-10-CM

## 2022-03-10 NOTE — TELEPHONE ENCOUNTER
Called pt to inform him that we have him scheduled for a tips revision with Dr. Trinidad    Informed him    Weds 3/30  Check in at 12 pm for a 130pm start time  Need covid testing    Needs a ride    Asked him to return my call so that I can provide more details    Left direct line     Denise LAZARO RN, BSN  Interventional Radiology/Vascular  Nurse Coordinator   Phone: [phone]  Fax: [phone]

## 2022-03-11 ENCOUNTER — PATIENT OUTREACH (OUTPATIENT)
Dept: SURGERY | Facility: CLINIC | Age: 57
End: 2022-03-11
Payer: COMMERCIAL

## 2022-03-11 NOTE — PROGRESS NOTES
Patient Telephone Reminder Call    Date of call:  03/11/22  Phone numbers:  Home number on file [phone] (home)    Reached patient/confirmed appointment:  No - left message:   on voicemail  Appointment with:   Dr. Jose Tripp  Reason for visit:  Hernia consult

## 2022-03-14 ENCOUNTER — PRE VISIT (OUTPATIENT)
Dept: SURGERY | Facility: CLINIC | Age: 57
End: 2022-03-14

## 2022-03-14 ENCOUNTER — TELEPHONE (OUTPATIENT)
Dept: VASCULAR SURGERY | Facility: CLINIC | Age: 57
End: 2022-03-14

## 2022-03-14 ENCOUNTER — OFFICE VISIT (OUTPATIENT)
Dept: SURGERY | Facility: CLINIC | Age: 57
End: 2022-03-14
Attending: INTERNAL MEDICINE
Payer: COMMERCIAL

## 2022-03-14 VITALS
BODY MASS INDEX: 25.56 KG/M2 | HEART RATE: 81 BPM | HEIGHT: 70 IN | WEIGHT: 178.5 LBS | OXYGEN SATURATION: 97 % | SYSTOLIC BLOOD PRESSURE: 135 MMHG | DIASTOLIC BLOOD PRESSURE: 74 MMHG

## 2022-03-14 DIAGNOSIS — K42.9 UMBILICAL HERNIA WITHOUT OBSTRUCTION AND WITHOUT GANGRENE: ICD-10-CM

## 2022-03-14 DIAGNOSIS — K70.31 ALCOHOLIC CIRRHOSIS OF LIVER WITH ASCITES (H): ICD-10-CM

## 2022-03-14 PROCEDURE — 99204 OFFICE O/P NEW MOD 45 MIN: CPT | Performed by: SURGERY

## 2022-03-14 ASSESSMENT — PAIN SCALES - GENERAL: PAINLEVEL: MILD PAIN (3)

## 2022-03-14 NOTE — LETTER
3/14/2022       RE: Ivan Bell  [address] [facility] [address]     Dear Colleague,    Thank you for referring your patient, Ivan Bell, to the [facility] GENERAL SURGERY [facility]INIC [place] [facility]. Please see a copy of my visit note below.    Surgery CLINIC VISIT    HISTORY OF PRESENT ILLNESS:  Ivan Bell is a 56-year-old male who presents with a reducible umbilical hernia associated with hepatic cirrhosis and ascites.  The patient is planning to undergo a redo or revision of his TIPS with the potential to decrease the amount of ascites.  The patient would like to have the repair.  I discussed with the patient the difficulties associated with ascites and hepatic failure.  The patient has already been evaluated previously in the Surgery Clinic and considered a difficult candidate due to the ascites.    ROS: a ten point ROS is negative.     PAST MEDICAL HISTORY:  Past Medical History:   Diagnosis Date     Alcoholic cirrhosis of liver (H)      Alcoholic hepatitis 03/2019     Chronic kidney disease     CRF     Depression      Gout      History of transfusion      Hypertension      Hypertension      Hypertriglyceridemia      Left calcaneus fracture 01/09/2006 January 16, 2006: Fell 10 feet from ladder onto left foot on frozen ground on 1/9/06 at home.  Immediate pain and unable to walk- seen at [place] and diagnosed with calcaneus fracture     Nephrolithiasis      Osteoarthritis      Portal vein thrombosis     left occlusion, partial main        PAST SURGICAL HISTORY:  Past Surgical History:   Procedure Laterality Date     ANKLE SURGERY Left      ANKLE SURGERY       ARTHROSCOPY KNEE       ARTHROSCOPY SHOULDER       ARTHROSCOPY SHOULDER ROTATOR CUFF REPAIR       COLONOSCOPY N/A 03/31/2016    Procedure: COLONOSCOPY;  Surgeon: Rhys Uriostegui MD;  Location:  GI     ESOPHAGOSCOPY, GASTROSCOPY, DUODENOSCOPY (EGD), COMBINED N/A  03/31/2016    Procedure: COMBINED ESOPHAGOSCOPY, GASTROSCOPY, DUODENOSCOPY (EGD);  Surgeon: Rhys Uriostegui MD;  Location:  GI     ESOPHAGOSCOPY, GASTROSCOPY, DUODENOSCOPY (EGD), COMBINED N/A 03/09/2018    Procedure: COMBINED ESOPHAGOSCOPY, GASTROSCOPY, DUODENOSCOPY (EGD), BIOPSY SINGLE OR MULTIPLE;  EGD;  Surgeon: Gonzalo Wahl MD;  Location:  GI     ESOPHAGOSCOPY, GASTROSCOPY, DUODENOSCOPY (EGD), COMBINED N/A 06/07/2019    Procedure: ESOPHAGOGASTRODUODENOSCOPY (EGD);  Surgeon: Gonzalo Wahl MD;  Location:  GI     FOOT SURGERY       HIP SURGERY       IR PARACENTESIS  10/29/2019     IR PARACENTESIS  11/25/2020     IR PARACENTESIS  8/11/2021     IR PARACENTESIS  1/28/2022     IR TRANSVEN INTRAHEPATIC PORTOSYST REV  10/29/2019     IR TRANSVEN INTRAHEPATIC PORTOSYST REV  11/25/2020     IR TRANSVEN INTRAHEPATIC PORTOSYST REV  8/11/2021     IR TRANSVEN INTRAHEPATIC PORTOSYST REV  1/28/2022     KNEE SURGERY Left      KNEE SURGERY Right      RELEASE CARPAL TUNNEL       RELEASE TRIGGER FINGER Right 06/11/2020    Procedure: RELEASE, TRIGGER FINGER, right ring and long finger;  Surgeon: Pascual Valencia MD;  Location:  OR     SIGMOIDOSCOPY FLEXIBLE N/A 10/31/2017    Procedure: SIGMOIDOSCOPY FLEXIBLE;;  Surgeon: Armaan Adams MD;  Location:  GI     TIPS Procedure  06/06/2018     TIPS PROCEDURE  11/01/2020     ZZC PLASTY KNEE,MED OR LAT COMPARTMT Right 02/19/2021    Procedure: RIGHT UNICOMPARTMENTAL ARTHROPLASTY KNEE MEDIAL;  Surgeon: José Miguel Landaverde MD;  Location: [facility];  Service: Orthopedics        MEDICATIONS:  Current Outpatient Medications   Medication     acetaminophen (TYLENOL) 500 MG tablet     Ascorbic Acid (VITAMIN C PO)     ciprofloxacin (CIPRO) 500 MG tablet     eplerenone (INSPRA) 50 MG tablet     fexofenadine (ALLEGRA) 60 MG tablet     furosemide (LASIX) 20 MG tablet     furosemide (LASIX) 40 MG tablet     lactulose (CHRONULAC) 10 GM/15ML solution      Menaquinone-7 (VITAMIN K2) 100 MCG CAPS     MULTIPLE VITAMINS PO     ondansetron (ZOFRAN-ODT) 4 MG ODT tab     potassium chloride ER (KLOR-CON M) 20 MEQ CR tablet     rifaximin (XIFAXAN) 550 MG TABS tablet     sertraline (ZOLOFT) 50 MG tablet     sildenafil (REVATIO) 20 MG tablet     sodium bicarbonate 650 MG tablet     tamsulosin (FLOMAX) 0.4 MG capsule     vitamin D3 (CHOLECALCIFEROL) 2000 units (50 mcg) tablet     No current facility-administered medications for this visit.        ALLERGIES:  Allergies   Allergen Reactions     Prednisone Visual Disturbance     Trazodone Visual Disturbance     Benadryl [Diphenhydramine] Other (See Comments)     Delirium (visual and auditory hallucinations)        SOCIAL HISTORY:  Social History     Socioeconomic History     Marital status:      Spouse name: None     Number of children: None     Years of education: None     Highest education level: None   Occupational History     None   Tobacco Use     Smoking status: Former Smoker     Packs/day: 0.00     Types: Dip, chew, snus or snuff     Quit date: 1998     Years since quittin.5     Smokeless tobacco: Former User     Types: Chew     Tobacco comment: 1 tin per 10 days.   Vaping Use     Vaping Use: Never used   Substance and Sexual Activity     Alcohol use: No     Alcohol/week: 17.5 standard drinks     Types: 21 Cans of beer per week     Comment: last etoh 14, did have Odouls ~2017     Drug use: No     Sexual activity: Not Currently   Other Topics Concern     Parent/sibling w/ CABG, MI or angioplasty before 65F 55M? No   Social History Narrative     None     Social Determinants of Health     Financial Resource Strain: Not on file   Food Insecurity: Not on file   Transportation Needs: Not on file   Physical Activity: Not on file   Stress: Not on file   Social Connections: Not on file   Intimate Partner Violence: Not on file   Housing Stability: Not on file       FAMILY HISTORY:  Family History   Problem  "Relation Age of Onset     Family History Negative Mother      Family History Negative Father      Hypertension Father      Cerebrovascular Disease Father 87     Breast Cancer Maternal Grandmother      Rheumatoid Arthritis Daughter      Depression Daughter      Substance Abuse Brother      Cancer - colorectal No family hx of      Prostate Cancer No family hx of      Liver Disease No family hx of      PHYSICAL EXAM:  Vital Signs: /74   Pulse 81   Ht 1.778 m (5' 10\")   Wt 81 kg (178 lb 8 oz)   SpO2 97%   BMI 25.61 kg/m    GENERAL:  Ivan Bell was examined in the standing and supine position.    ABDOMEN:  The patient has a reducible umbilical hernia that appears to be mostly fluid-filled with ascites.  The fascial defect is approximately 1 cm.  The remainder of the abdomen demonstrates no masses or other hernias.  There are no groin hernias.  The abdomen is soft with mild tenderness to deep palpation in the right upper quadrant.    EXTREMITIES:  Warm and well perfused.    IMPRESSION:  Ivan Bell is a 56-year-old male with end-stage liver disease, ascites and a reducible umbilical hernia.  The patient is quite interested to have the hernia repaired; however, he understands the need to reduce the amount of ascites as much as possible prior to any procedure to avoid ascites draining across a surgical repair.  The patient will undergo his TIPS procedure.  He will call or return should he develop problems in the interim.  The patient will return to the General Surgery Clinic in approximately 2-3 months following TIPS procedure to see if he becomes a candidate for possible umbilical hernia repair.    Jose Tripp MD  "

## 2022-03-14 NOTE — PROGRESS NOTES
Surgery CLINIC VISIT    HISTORY OF PRESENT ILLNESS:  Ivan Bell is a 56-year-old male who presents with a reducible umbilical hernia associated with hepatic cirrhosis and ascites.  The patient is planning to undergo a redo or revision of his TIPS with the potential to decrease the amount of ascites.  The patient would like to have the repair.  I discussed with the patient the difficulties associated with ascites and hepatic failure.  The patient has already been evaluated previously in the Surgery Clinic and considered a difficult candidate due to the ascites.    ROS: a ten point ROS is negative.     PAST MEDICAL HISTORY:  Past Medical History:   Diagnosis Date     Alcoholic cirrhosis of liver (H)      Alcoholic hepatitis 03/2019     Chronic kidney disease     CRF     Depression      Gout      History of transfusion      Hypertension      Hypertension      Hypertriglyceridemia      Left calcaneus fracture 01/09/2006 January 16, 2006: Fell 10 feet from ladder onto left foot on frozen ground on 1/9/06 at home.  Immediate pain and unable to walk- seen at [place] and diagnosed with calcaneus fracture     Nephrolithiasis      Osteoarthritis      Portal vein thrombosis     left occlusion, partial main        PAST SURGICAL HISTORY:  Past Surgical History:   Procedure Laterality Date     ANKLE SURGERY Left      ANKLE SURGERY       ARTHROSCOPY KNEE       ARTHROSCOPY SHOULDER       ARTHROSCOPY SHOULDER ROTATOR CUFF REPAIR       COLONOSCOPY N/A 03/31/2016    Procedure: COLONOSCOPY;  Surgeon: Rhys Uriostegui MD;  Location:  GI     ESOPHAGOSCOPY, GASTROSCOPY, DUODENOSCOPY (EGD), COMBINED N/A 03/31/2016    Procedure: COMBINED ESOPHAGOSCOPY, GASTROSCOPY, DUODENOSCOPY (EGD);  Surgeon: Rhys Uriostegui MD;  Location:  GI     ESOPHAGOSCOPY, GASTROSCOPY, DUODENOSCOPY (EGD), COMBINED N/A 03/09/2018    Procedure: COMBINED ESOPHAGOSCOPY, GASTROSCOPY, DUODENOSCOPY (EGD), BIOPSY SINGLE OR MULTIPLE;   EGD;  Surgeon: Gonzalo Wahl MD;  Location:  GI     ESOPHAGOSCOPY, GASTROSCOPY, DUODENOSCOPY (EGD), COMBINED N/A 06/07/2019    Procedure: ESOPHAGOGASTRODUODENOSCOPY (EGD);  Surgeon: Gonzalo Wahl MD;  Location:  GI     FOOT SURGERY       HIP SURGERY       IR PARACENTESIS  10/29/2019     IR PARACENTESIS  11/25/2020     IR PARACENTESIS  8/11/2021     IR PARACENTESIS  1/28/2022     IR TRANSVEN INTRAHEPATIC PORTOSYST REV  10/29/2019     IR TRANSVEN INTRAHEPATIC PORTOSYST REV  11/25/2020     IR TRANSVEN INTRAHEPATIC PORTOSYST REV  8/11/2021     IR TRANSVEN INTRAHEPATIC PORTOSYST REV  1/28/2022     KNEE SURGERY Left      KNEE SURGERY Right      RELEASE CARPAL TUNNEL       RELEASE TRIGGER FINGER Right 06/11/2020    Procedure: RELEASE, TRIGGER FINGER, right ring and long finger;  Surgeon: Pascual Valencia MD;  Location: [facility]     SIGMOIDOSCOPY FLEXIBLE N/A 10/31/2017    Procedure: SIGMOIDOSCOPY FLEXIBLE;;  Surgeon: Armaan Adams MD;  Location:  GI     TIPS Procedure  06/06/2018     TIPS PROCEDURE  11/01/2020     ZZC PLASTY KNEE,MED OR LAT COMPARTMT Right 02/19/2021    Procedure: RIGHT UNICOMPARTMENTAL ARTHROPLASTY KNEE MEDIAL;  Surgeon: José Miguel Landaverde MD;  Location: [facility];  Service: Orthopedics        MEDICATIONS:  Current Outpatient Medications   Medication     acetaminophen (TYLENOL) 500 MG tablet     Ascorbic Acid (VITAMIN C PO)     ciprofloxacin (CIPRO) 500 MG tablet     eplerenone (INSPRA) 50 MG tablet     fexofenadine (ALLEGRA) 60 MG tablet     furosemide (LASIX) 20 MG tablet     furosemide (LASIX) 40 MG tablet     lactulose (CHRONULAC) 10 GM/15ML solution     Menaquinone-7 (VITAMIN K2) 100 MCG CAPS     MULTIPLE VITAMINS PO     ondansetron (ZOFRAN-ODT) 4 MG ODT tab     potassium chloride ER (KLOR-CON M) 20 MEQ CR tablet     rifaximin (XIFAXAN) 550 MG TABS tablet     sertraline (ZOLOFT) 50 MG tablet     sildenafil (REVATIO) 20 MG tablet     sodium bicarbonate 650 MG  tablet     tamsulosin (FLOMAX) 0.4 MG capsule     vitamin D3 (CHOLECALCIFEROL) 2000 units (50 mcg) tablet     No current facility-administered medications for this visit.        ALLERGIES:  Allergies   Allergen Reactions     Prednisone Visual Disturbance     Trazodone Visual Disturbance     Benadryl [Diphenhydramine] Other (See Comments)     Delirium (visual and auditory hallucinations)        SOCIAL HISTORY:  Social History     Socioeconomic History     Marital status:      Spouse name: None     Number of children: None     Years of education: None     Highest education level: None   Occupational History     None   Tobacco Use     Smoking status: Former Smoker     Packs/day: 0.00     Types: Dip, chew, snus or snuff     Quit date: 1998     Years since quittin.5     Smokeless tobacco: Former User     Types: Chew     Tobacco comment: 1 tin per 10 days.   Vaping Use     Vaping Use: Never used   Substance and Sexual Activity     Alcohol use: No     Alcohol/week: 17.5 standard drinks     Types: 21 Cans of beer per week     Comment: last etoh 14, did have Odouls ~2017     Drug use: No     Sexual activity: Not Currently   Other Topics Concern     Parent/sibling w/ CABG, MI or angioplasty before 65F 55M? No   Social History Narrative     None     Social Determinants of Health     Financial Resource Strain: Not on file   Food Insecurity: Not on file   Transportation Needs: Not on file   Physical Activity: Not on file   Stress: Not on file   Social Connections: Not on file   Intimate Partner Violence: Not on file   Housing Stability: Not on file       FAMILY HISTORY:  Family History   Problem Relation Age of Onset     Family History Negative Mother      Family History Negative Father      Hypertension Father      Cerebrovascular Disease Father 87     Breast Cancer Maternal Grandmother      Rheumatoid Arthritis Daughter      Depression Daughter      Substance Abuse Brother      Cancer - colorectal No  "family hx of      Prostate Cancer No family hx of      Liver Disease No family hx of         PHYSICAL EXAM:  Vital Signs: /74   Pulse 81   Ht 1.778 m (5' 10\")   Wt 81 kg (178 lb 8 oz)   SpO2 97%   BMI 25.61 kg/m    GENERAL:  Ivan Bell was examined in the standing and supine position.    ABDOMEN:  The patient has a reducible umbilical hernia that appears to be mostly fluid-filled with ascites.  The fascial defect is approximately 1 cm.  The remainder of the abdomen demonstrates no masses or other hernias.  There are no groin hernias.  The abdomen is soft with mild tenderness to deep palpation in the right upper quadrant.    EXTREMITIES:  Warm and well perfused.    IMPRESSION:  Ivan Bell is a 56-year-old male with end-stage liver disease, ascites and a reducible umbilical hernia.  The patient is quite interested to have the hernia repaired; however, he understands the need to reduce the amount of ascites as much as possible prior to any procedure to avoid ascites draining across a surgical repair.  The patient will undergo his TIPS procedure.  He will call or return should he develop problems in the interim.  The patient will return to the General Surgery Clinic in approximately 2-3 months following TIPS procedure to see if he becomes a candidate for possible umbilical hernia repair.        "

## 2022-03-14 NOTE — NURSING NOTE
"Chief Complaint   Patient presents with     Consult     Umbilical hernia       Vitals:    03/14/22 0759   BP: 135/74   Pulse: 81   SpO2: 97%   Weight: 81 kg (178 lb 8 oz)   Height: 1.778 m (5' 10\")       Body mass index is 25.61 kg/m .          RIO CAZARES EMT    "

## 2022-03-14 NOTE — TELEPHONE ENCOUNTER
Called pt again to fup on my msg regarding his tips    Informed him that this is scheduled his tips revision with Dr Trinidad on Weds 3/30 at 130pm   Check into [facility] at 12pm     covid testing 4 days prior to  Needs a  to bring him home    All appt information given to him and we will also send out a letter and mychart msg    He is to call me with any other questions    Denise LAZARO RN, BSN  Interventional Radiology/Vascular  Nurse Coordinator   Phone: [phone]  Fax: [phone]

## 2022-03-14 NOTE — TELEPHONE ENCOUNTER
Called and left a msg for pt to return my call regarding a discussion to have his tips revised soon.    Left my direct line for him to return my call.       Denise LAZARO RN, BSN  Interventional Radiology/Vascular  Nurse Coordinator   Phone: [phone]  Fax: [phone]

## 2022-03-16 ENCOUNTER — OFFICE VISIT (OUTPATIENT)
Dept: INFUSION THERAPY | Facility: CLINIC | Age: 57
End: 2022-03-16
Attending: INTERNAL MEDICINE
Payer: COMMERCIAL

## 2022-03-16 ENCOUNTER — ANCILLARY PROCEDURE (OUTPATIENT)
Dept: ULTRASOUND IMAGING | Facility: CLINIC | Age: 57
End: 2022-03-16
Attending: INTERNAL MEDICINE
Payer: COMMERCIAL

## 2022-03-16 VITALS
HEART RATE: 77 BPM | BODY MASS INDEX: 24.34 KG/M2 | WEIGHT: 169.6 LBS | TEMPERATURE: 98.8 F | DIASTOLIC BLOOD PRESSURE: 76 MMHG | RESPIRATION RATE: 18 BRPM | SYSTOLIC BLOOD PRESSURE: 136 MMHG | OXYGEN SATURATION: 98 %

## 2022-03-16 DIAGNOSIS — K70.31 ALCOHOLIC CIRRHOSIS OF LIVER WITH ASCITES (H): Primary | ICD-10-CM

## 2022-03-16 PROCEDURE — 250N000011 HC RX IP 250 OP 636: Performed by: INTERNAL MEDICINE

## 2022-03-16 PROCEDURE — P9047 ALBUMIN (HUMAN), 25%, 50ML: HCPCS | Performed by: INTERNAL MEDICINE

## 2022-03-16 PROCEDURE — 272N000706 US PARACENTESIS

## 2022-03-16 PROCEDURE — 250N000009 HC RX 250: Performed by: INTERNAL MEDICINE

## 2022-03-16 PROCEDURE — 49083 ABD PARACENTESIS W/IMAGING: CPT | Performed by: PHYSICIAN ASSISTANT

## 2022-03-16 RX ORDER — ALBUMIN (HUMAN) 12.5 G/50ML
12.5 SOLUTION INTRAVENOUS
Status: CANCELLED | OUTPATIENT
Start: 2022-03-16

## 2022-03-16 RX ORDER — NALOXONE HYDROCHLORIDE 0.4 MG/ML
0.2 INJECTION, SOLUTION INTRAMUSCULAR; INTRAVENOUS; SUBCUTANEOUS
Status: CANCELLED | OUTPATIENT
Start: 2022-03-16

## 2022-03-16 RX ORDER — ALBUTEROL SULFATE 0.83 MG/ML
2.5 SOLUTION RESPIRATORY (INHALATION)
Status: CANCELLED | OUTPATIENT
Start: 2022-03-16

## 2022-03-16 RX ORDER — EPINEPHRINE 1 MG/ML
0.3 INJECTION, SOLUTION INTRAMUSCULAR; SUBCUTANEOUS EVERY 5 MIN PRN
Status: CANCELLED | OUTPATIENT
Start: 2022-03-16

## 2022-03-16 RX ORDER — MEPERIDINE HYDROCHLORIDE 25 MG/ML
25 INJECTION INTRAMUSCULAR; INTRAVENOUS; SUBCUTANEOUS EVERY 30 MIN PRN
Status: CANCELLED | OUTPATIENT
Start: 2022-03-16

## 2022-03-16 RX ORDER — HEPARIN SODIUM (PORCINE) LOCK FLUSH IV SOLN 100 UNIT/ML 100 UNIT/ML
5 SOLUTION INTRAVENOUS
Status: CANCELLED | OUTPATIENT
Start: 2022-03-16

## 2022-03-16 RX ORDER — LIDOCAINE HYDROCHLORIDE 10 MG/ML
20 INJECTION, SOLUTION EPIDURAL; INFILTRATION; INTRACAUDAL; PERINEURAL ONCE
Status: CANCELLED | OUTPATIENT
Start: 2022-03-16 | End: 2022-03-16

## 2022-03-16 RX ORDER — ALBUTEROL SULFATE 90 UG/1
1-2 AEROSOL, METERED RESPIRATORY (INHALATION)
Status: CANCELLED
Start: 2022-03-16

## 2022-03-16 RX ORDER — LIDOCAINE HYDROCHLORIDE 10 MG/ML
20 INJECTION, SOLUTION EPIDURAL; INFILTRATION; INTRACAUDAL; PERINEURAL ONCE
Status: COMPLETED | OUTPATIENT
Start: 2022-03-16 | End: 2022-03-16

## 2022-03-16 RX ORDER — ALBUMIN (HUMAN) 12.5 G/50ML
12.5 SOLUTION INTRAVENOUS
Status: DISCONTINUED | OUTPATIENT
Start: 2022-03-16 | End: 2022-03-16 | Stop reason: HOSPADM

## 2022-03-16 RX ORDER — DIPHENHYDRAMINE HYDROCHLORIDE 50 MG/ML
50 INJECTION INTRAMUSCULAR; INTRAVENOUS
Status: CANCELLED
Start: 2022-03-16

## 2022-03-16 RX ORDER — METHYLPREDNISOLONE SODIUM SUCCINATE 125 MG/2ML
125 INJECTION, POWDER, LYOPHILIZED, FOR SOLUTION INTRAMUSCULAR; INTRAVENOUS
Status: CANCELLED
Start: 2022-03-16

## 2022-03-16 RX ORDER — HEPARIN SODIUM,PORCINE 10 UNIT/ML
5 VIAL (ML) INTRAVENOUS
Status: CANCELLED | OUTPATIENT
Start: 2022-03-16

## 2022-03-16 RX ADMIN — LIDOCAINE HYDROCHLORIDE 20 ML: 10 INJECTION, SOLUTION EPIDURAL; INFILTRATION; INTRACAUDAL; PERINEURAL at 14:23

## 2022-03-16 RX ADMIN — ALBUMIN HUMAN 12.5 G: 0.25 SOLUTION INTRAVENOUS at 14:27

## 2022-03-16 NOTE — LETTER
3/16/2022         RE: Ivan Bell  [address] [address] [address]        Dear Colleague,    Thank you for referring your patient, Ivan Bell, to the [facility]. Please see a copy of my visit note below.    Paracentesis Nursing Note  Ivan Bell presents today to [facility] for a paracentesis.    During today's appointment orders from Dr. Bales were completed.    Progress Note:  Patient identification verified by name and date of birth.  Assessment completed.  Vitals monitored throughout appointment and recorded in Doc Flowsheets.  See proceduralist note in ultrasound.    Vascular Access: peripheral IV placed today.  Labs: were not ordered for this appointment.    Date of consent or authorization: 12/27/21.  Invasive Procedure Safety Checklist was completed and sent for scanning.     Paracentesis performed by Dr. Cox.    The following labs were communicated to provider performing paracentesis:  Lab Results   Component Value Date    PLT 67 03/07/2022    PLT 58 06/23/2021       Total amount of ascites fluid drained: 4.7 liters.  Color of ascites fluid: yellow.  Total amount of albumin given: 12.5  grams.    Patient tolerated procedure well.    Post procedure,denies pain or discomfort post paracentesis.    Asymptomatic COVID test date: patient having surgery prior to next paracentesis (per patient) so will have COVID test done then.    Discharge Plan:  Discharge instructions were reviewed with patient.  Patient/Representative verbalized understanding and all questions were answered.   Discharged from [facility] in stable condition.    Sepideh Le RN    Administrations This Visit     albumin human 25 % injection 12.5 g     Admin Date  03/16/2022 Action  New Bag Dose  12.5 g Route  Intravenous Administered By  Sepideh Le, DILIA          lidocaine (PF) (XYLOCAINE) 1 % injection 20 mL      Admin Date  03/16/2022 Action  Given Dose  20 mL Route  Subcutaneous Administered By  Sepideh Le, DILIA              /82   Pulse 76   Temp 98.8  F (37.1  C) (Oral)   Resp 18   SpO2 98%           Again, thank you for allowing me to participate in the care of your patient.      Sincerely,    [facility] Paracentesis Provider

## 2022-03-16 NOTE — PROGRESS NOTES
Paracentesis Nursing Note  Ivan Bell presents today to [facility] for a paracentesis.    During today's appointment orders from Dr. Bales were completed.    Progress Note:  Patient identification verified by name and date of birth.  Assessment completed.  Vitals monitored throughout appointment and recorded in Doc Flowsheets.  See proceduralist note in ultrasound.    Vascular Access: peripheral IV placed today.  Labs: were not ordered for this appointment.    Date of consent or authorization: 12/27/21.  Invasive Procedure Safety Checklist was completed and sent for scanning.     Paracentesis performed by Dr. Cox.    The following labs were communicated to provider performing paracentesis:  Lab Results   Component Value Date    PLT 67 03/07/2022    PLT 58 06/23/2021       Total amount of ascites fluid drained: 4.7 liters.  Color of ascites fluid: yellow.  Total amount of albumin given: 12.5  grams.    Patient tolerated procedure well.    Post procedure,denies pain or discomfort post paracentesis.    Asymptomatic COVID test date: patient having surgery prior to next paracentesis (per patient) so will have COVID test done then.    Discharge Plan:  Discharge instructions were reviewed with patient.  Patient/Representative verbalized understanding and all questions were answered.   Discharged from [facility] in stable condition.    Sepideh Le RN       Administrations This Visit     albumin human 25 % injection 12.5 g     Admin Date  03/16/2022 Action  New Bag Dose  12.5 g Route  Intravenous Administered By  Sepideh Le, DILIA          lidocaine (PF) (XYLOCAINE) 1 % injection 20 mL     Admin Date  03/16/2022 Action  Given Dose  20 mL Route  Subcutaneous Administered By  Sepideh Le, DILIA                /82   Pulse 76   Temp 98.8  F (37.1  C) (Oral)   Resp 18   SpO2 98%

## 2022-03-17 ENCOUNTER — ANCILLARY PROCEDURE (OUTPATIENT)
Dept: GENERAL RADIOLOGY | Facility: CLINIC | Age: 57
End: 2022-03-17
Attending: PHYSICIAN ASSISTANT
Payer: COMMERCIAL

## 2022-03-17 ENCOUNTER — PATIENT OUTREACH (OUTPATIENT)
Dept: GASTROENTEROLOGY | Facility: CLINIC | Age: 57
End: 2022-03-17

## 2022-03-17 ENCOUNTER — OFFICE VISIT (OUTPATIENT)
Dept: FAMILY MEDICINE | Facility: CLINIC | Age: 57
End: 2022-03-17
Payer: COMMERCIAL

## 2022-03-17 VITALS
HEART RATE: 68 BPM | WEIGHT: 169 LBS | BODY MASS INDEX: 24.2 KG/M2 | HEIGHT: 70 IN | DIASTOLIC BLOOD PRESSURE: 67 MMHG | RESPIRATION RATE: 20 BRPM | SYSTOLIC BLOOD PRESSURE: 118 MMHG | OXYGEN SATURATION: 99 % | TEMPERATURE: 97.5 F

## 2022-03-17 DIAGNOSIS — J01.90 ACUTE NON-RECURRENT SINUSITIS, UNSPECIFIED LOCATION: Primary | ICD-10-CM

## 2022-03-17 DIAGNOSIS — J98.4 PNEUMONITIS: ICD-10-CM

## 2022-03-17 DIAGNOSIS — Z11.52 ENCOUNTER FOR SCREENING LABORATORY TESTING FOR COVID-19 VIRUS: ICD-10-CM

## 2022-03-17 DIAGNOSIS — R06.00 DYSPNEA, UNSPECIFIED TYPE: ICD-10-CM

## 2022-03-17 DIAGNOSIS — R05.9 COUGH: ICD-10-CM

## 2022-03-17 PROCEDURE — 99214 OFFICE O/P EST MOD 30 MIN: CPT | Performed by: PHYSICIAN ASSISTANT

## 2022-03-17 PROCEDURE — U0005 INFEC AGEN DETEC AMPLI PROBE: HCPCS | Performed by: PHYSICIAN ASSISTANT

## 2022-03-17 PROCEDURE — U0003 INFECTIOUS AGENT DETECTION BY NUCLEIC ACID (DNA OR RNA); SEVERE ACUTE RESPIRATORY SYNDROME CORONAVIRUS 2 (SARS-COV-2) (CORONAVIRUS DISEASE [COVID-19]), AMPLIFIED PROBE TECHNIQUE, MAKING USE OF HIGH THROUGHPUT TECHNOLOGIES AS DESCRIBED BY CMS-2020-01-R: HCPCS | Performed by: PHYSICIAN ASSISTANT

## 2022-03-17 PROCEDURE — 71046 X-RAY EXAM CHEST 2 VIEWS: CPT | Performed by: RADIOLOGY

## 2022-03-17 RX ORDER — BENZONATATE 100 MG/1
100 CAPSULE ORAL 3 TIMES DAILY PRN
Qty: 15 CAPSULE | Refills: 0 | Status: SHIPPED | OUTPATIENT
Start: 2022-03-17 | End: 2022-10-12

## 2022-03-17 ASSESSMENT — PAIN SCALES - GENERAL: PAINLEVEL: NO PAIN (0)

## 2022-03-17 NOTE — PATIENT INSTRUCTIONS
Your symptoms are concerning for a sinus infection, COVID-19, or other viral illness.  A COVID-19 test has been completed.  After completing the test, results should be available within 1-2 days and will be sent to your MyChart.  We are also completing a chest x-ray and will send those results to [facility].  You may use prescribed Tessalon for cough and congestion.  Make sure to get plenty of rest and stay hydrated.     To help treat the sinus infection, you have been prescribed Augmentin, an antibiotic.  Please take as prescribed.     If you have severe symptoms such as chest pain, wheezing, coughing up blood, shortness of breath, or fevers that you cannot control, please go to the emergency department.    Please reach out with any questions or concerns.  Take care,  Precious Billingsley PA-C    Patient Education     Sinusitis (Antibiotic Treatment)    The sinuses are air-filled spaces within the bones of the face. They connect to the inside of the nose. Sinusitis is an inflammation of the tissue that lines the sinuses. Sinusitis can occur during a cold. It can also happen due to allergies to pollens and other particles in the air. Sinusitis can cause symptoms of sinus congestion and a feeling of fullness. A sinus infection causes fever, headache, and facial pain. There is often green or yellow fluid draining from the nose or into the back of the throat (post-nasal drip). You have been given antibiotics to treat this condition.   Home care    Take the full course of antibiotics as instructed. Don't stop taking them, even when you feel better.    Drink plenty of water, hot tea, and other liquids as directed by the healthcare provider. This may help thin nasal mucus. It also may help your sinuses drain fluids.    Heat may help soothe painful areas of your face. Use a towel soaked in hot water. Or,  the shower and direct the warm spray onto your face. Using a vaporizer along with a menthol rub at night may also help  soothe symptoms.     An expectorant with guaifenesin may help thin nasal mucus and help your sinuses drain fluids. Talk with your provider or pharmacists before taking an over-the-counter (OTC) medicine if you have any questions about it or its side effects..    You can use an OTC decongestant, unless a similar medicine was prescribed to you. Nasal sprays work the fastest. Use one that contains phenylephrine or oxymetazoline. First blow your nose gently. Then use the spray. Don't use these medicines more often than directed on the label. If you do, your symptoms may get worse. You may also take pills that contain pseudoephedrine. Don t use products that combine multiple medicines. This is because side effects may be increased. Read labels. You can also ask the pharmacist for help. (People with high blood pressure should not use decongestants. They can raise blood pressure.) Talk with your provider or pharmacist if you have any questions about the medicine..    OTC antihistamines may help if allergies contributed to your sinusitis. Talk with your provider or pharmacist if you have any questions about the medicine..    Don't use nasal rinses or irrigation during an acute sinus infection, unless your healthcare provider tells you to. Rinsing may spread the infection to other areas in your sinuses.    Use acetaminophen or ibuprofen to control pain, unless another pain medicine was prescribed to you. If you have chronic liver or kidney disease or ever had a stomach ulcer, talk with your healthcare provider before using these medicines. Never give aspirin to anyone under age 18 who is ill with a fever. It may cause severe liver damage.    Don't smoke. This can make symptoms worse.    Follow-up care  Follow up with your healthcare provider, or as advised.   When to seek medical advice  Call your healthcare provider if any of these occur:     Facial pain or headache that gets worse    Stiff neck    Unusual drowsiness or  confusion    Swelling of your forehead or eyelids    Symptoms don't go away in 10 days    Vision problems, such as blurred or double vision    Fever of 100.4 F (38 C) or higher, or as directed by your healthcare provider  Call 911  Call 911 if any of these occur:     Seizure    Trouble breathing    Feeling dizzy or faint    Fingernails, skin or lips look blue, purple , or gray  Prevention  Here are steps you can take to help prevent an infection:     Keep good hand washing habits.    Don t have close contact with people who have sore throats, colds, or other upper respiratory infections.    Don t smoke, and stay away from secondhand smoke.    Stay up to date with of your vaccines.  [employer] last reviewed this educational content on 12/1/2019 2000-2021 [employer]. All rights reserved. This information is not intended as a substitute for professional medical care. Always follow your healthcare professional's instructions.           Patient Education     Viral Upper Respiratory Illness (Adult)    You have a viral upper respiratory illness (URI), which is another term for the common cold. This illness is contagious during the first few days. It is spread through the air by coughing and sneezing. It may also be spread by direct contact (touching the sick person and then touching your own eyes, nose, or mouth). Frequent handwashing will decrease risk of spread. Most viral illnesses go away within 7 to 10 days with rest and simple home remedies. Sometimes the illness may last for several weeks. Antibiotics will not kill a virus, and they are generally not prescribed for this condition.  Home care    If symptoms are severe, rest at home for the first 2 to 3 days. When you resume activity, don't let yourself get too tired.    Don't smoke. If you need help stopping, talk with your healthcare provider.    Avoid being exposed to cigarette smoke (yours or others ).    You may use acetaminophen or ibuprofen to  control pain and fever, unless another medicine was prescribed. If you have chronic liver or kidney disease, have ever had a stomach ulcer or gastrointestinal bleeding, or are taking blood-thinning medicines, talk with your healthcare provider before using these medicines. Aspirin should never be given to anyone under 18 years of age who is ill with a viral infection or fever. It may cause severe liver or brain damage.    Your appetite may be poor, so a light diet is fine. Stay well hydrated by drinking 6 to 8 glasses of fluids per day (water, soft drinks, juices, tea, or soup). Extra fluids will help loosen secretions in the nose and lungs.    Over-the-counter cold medicines will not shorten the length of time you re sick, but they may be helpful for the following symptoms: cough, sore throat, and nasal and sinus congestion. If you take prescription medicines, ask your healthcare provider or pharmacist which over-the-counter medicines are safe to use. (Note: Don't use decongestants if you have high blood pressure.)  Follow-up care  Follow up with your healthcare provider, or as advised.  When to seek medical advice  Call your healthcare provider right away if any of these occur:    Cough with lots of colored sputum (mucus)    Severe headache; face, neck, or ear pain    Difficulty swallowing due to throat pain    Fever of 100.4 F (38 C) or higher, or as directed by your healthcare provider  Call 911  Call 911 if any of these occur:    Chest pain, shortness of breath, wheezing, or difficulty breathing    Coughing up blood    Very severe pain with swallowing, especially if it goes along with a muffled voice   [employer] last reviewed this educational content on 6/1/2018 2000-2021 [employer]. All rights reserved. This information is not intended as a substitute for professional medical care. Always follow your healthcare professional's instructions.

## 2022-03-17 NOTE — PROGRESS NOTES
Assessment & Plan     Acute non-recurrent sinusitis, unspecified location  Cough  Dyspnea, unspecified type  Pneumonitis  Encounter for screening laboratory testing for COVID-19 virus  Patient is a 48-year-old male with past medical history significant for alcohol cirrhosis of liver with ascites and CKD who presents to clinic due to 6 days of sinus pressure, congestion, facial pain, and cough.  Vital signs normal.  Physical exam significant for nasal congestion as well as crackles at bilateral lung bases.  Chest x-ray completed showing possible pneumonitis.  To treat pneumonitis as well as possible sinusitis, patient prescribed Augmentin.  Tessalon prescribed for cough.  Will complete COVID-19 screening.  Return and urgent/emergent follow-up precautions provided.  - amoxicillin-clavulanate (AUGMENTIN) 875-125 MG tablet; Take 1 tablet by mouth 2 times daily for 7 days  - XR Chest 2 Views; Future  - benzonatate (TESSALON) 100 MG capsule; Take 1 capsule (100 mg) by mouth 3 times daily as needed for cough  - Symptomatic; Yes; 3/11/2022 COVID-19 Virus (Coronavirus) by PCR Nose; Future  - Symptomatic; Yes; 3/11/2022 COVID-19 Virus (Coronavirus) by PCR Nose       See Patient Instructions    Return in about 1 week (around 3/24/2022), or if symptoms worsen or fail to improve.    Precious Billingsley PA-C  [facility] ERENDIRA Love is a 56 year old who presents for the following health issues     HPI     Acute Illness  Acute illness concerns: Sinus pain and pressure, primarily behind right eye. Patient notes history of sinus infection around 1-2 times per year. No seasonal allergies.   Onset/Duration: 6 days  Symptoms:  Fever: no  Chills/Sweats: no  Headache (location?): YES  Sinus Pressure: YES  Conjunctivitis:  no  Ear Pain: no  Rhinorrhea: YES  Congestion: YES  Sore Throat: no  Cough: YES-non-productive  Wheeze: no  Decreased Appetite: YES  Nausea: no  Vomiting: no  Diarrhea: no  Dysuria/Freq.:  "no  Dysuria or Hematuria: no  Fatigue/Achiness: no  Sick/Strep Exposure: no  Therapies tried and outcome: None          Objective    /67   Pulse 68   Temp 97.5  F (36.4  C) (Tympanic)   Resp 20   Ht 1.778 m (5' 10\")   Wt 76.7 kg (169 lb)   SpO2 99%   BMI 24.25 kg/m    Body mass index is 24.25 kg/m .  Physical Exam  Vitals and nursing note reviewed.   Constitutional:       General: He is not in acute distress.     Appearance: Normal appearance.   HENT:      Head: Normocephalic and atraumatic.      Nose: Congestion present.      Mouth/Throat:      Mouth: Mucous membranes are moist.      Pharynx: Oropharynx is clear. No oropharyngeal exudate or posterior oropharyngeal erythema.   Eyes:      Extraocular Movements: Extraocular movements intact.      Pupils: Pupils are equal, round, and reactive to light.   Cardiovascular:      Rate and Rhythm: Normal rate and regular rhythm.      Heart sounds: Normal heart sounds. No murmur heard.  Pulmonary:      Effort: Pulmonary effort is normal. No respiratory distress.      Breath sounds: Normal breath sounds. No wheezing.      Comments: Faint crackles at bilateral lung bases   Musculoskeletal:         General: Normal range of motion.      Cervical back: Normal range of motion.   Skin:     General: Skin is warm and dry.   Neurological:      General: No focal deficit present.      Mental Status: He is alert.   Psychiatric:         Mood and Affect: Mood normal.         Behavior: Behavior normal.            Chest x-ray, 2 views:  IMPRESSION:  1. Patchy right basilar airspace opacities with additional right  parahilar interstitial opacities are suggestive of pneumonitis. No  pleural effusion. No pneumothorax.  2. Normal cardiac and mediastinal silhouette.  3. TIPS.              "

## 2022-03-17 NOTE — PROGRESS NOTES
Provided additional numbers for pt to try to get reestablished and started in  eval, [phone]. Reviewed plan per IR and Dr. Bales. Pt was evaluated today for cough, and prescribed Augmentin for pneumonitis, covid results pending.

## 2022-03-18 LAB — SARS-COV-2 RNA RESP QL NAA+PROBE: NEGATIVE

## 2022-03-24 NOTE — TELEPHONE ENCOUNTER
The Intake Pod has attempted to reach patient via phone x 3, with the numbers provided in chart. Trying to reach you letter also mailed to patient over seven days ago, with no response from patient. Per work flow process, patient is being removed from the intake pods reporting work bench. The assigned RN coordinator for this patient will be notified that Intake Pod was unable to reach patient to initiate referral process.      MARIANA Segura, LPN       Solid Organ Transplant

## 2022-03-28 ENCOUNTER — LAB (OUTPATIENT)
Dept: LAB | Facility: CLINIC | Age: 57
End: 2022-03-28
Attending: RADIOLOGY

## 2022-03-28 DIAGNOSIS — Z11.59 ENCOUNTER FOR SCREENING FOR OTHER VIRAL DISEASES: ICD-10-CM

## 2022-03-28 LAB — SARS-COV-2 RNA RESP QL NAA+PROBE: NEGATIVE

## 2022-03-28 PROCEDURE — U0005 INFEC AGEN DETEC AMPLI PROBE: HCPCS | Performed by: RADIOLOGY

## 2022-03-30 ENCOUNTER — APPOINTMENT (OUTPATIENT)
Dept: INTERVENTIONAL RADIOLOGY/VASCULAR | Facility: CLINIC | Age: 57
End: 2022-03-30
Attending: RADIOLOGY
Payer: COMMERCIAL

## 2022-03-30 ENCOUNTER — APPOINTMENT (OUTPATIENT)
Dept: MEDSURG UNIT | Facility: CLINIC | Age: 57
End: 2022-03-30
Attending: RADIOLOGY
Payer: COMMERCIAL

## 2022-03-30 ENCOUNTER — HOSPITAL ENCOUNTER (OUTPATIENT)
Facility: CLINIC | Age: 57
Discharge: HOME | End: 2022-03-30
Attending: RADIOLOGY | Admitting: RADIOLOGY
Payer: COMMERCIAL

## 2022-03-30 VITALS
DIASTOLIC BLOOD PRESSURE: 65 MMHG | TEMPERATURE: 97.9 F | SYSTOLIC BLOOD PRESSURE: 120 MMHG | RESPIRATION RATE: 18 BRPM | HEART RATE: 74 BPM | OXYGEN SATURATION: 97 %

## 2022-03-30 DIAGNOSIS — K70.31 ALCOHOLIC CIRRHOSIS OF LIVER WITH ASCITES (H): ICD-10-CM

## 2022-03-30 LAB
ALBUMIN SERPL-MCNC: 2.2 G/DL (ref 3.4–5)
ALP SERPL-CCNC: 140 U/L (ref 40–150)
ALT SERPL W P-5'-P-CCNC: 21 U/L (ref 0–70)
AST SERPL W P-5'-P-CCNC: 51 U/L (ref 0–45)
BILIRUB DIRECT SERPL-MCNC: 1.2 MG/DL (ref 0–0.2)
BILIRUB SERPL-MCNC: 3.4 MG/DL (ref 0.2–1.3)
ERYTHROCYTE [DISTWIDTH] IN BLOOD BY AUTOMATED COUNT: 16.8 % (ref 10–15)
HCT VFR BLD AUTO: 33.4 % (ref 40–53)
HGB BLD-MCNC: 10.8 G/DL (ref 13.3–17.7)
INR PPP: 1.76 (ref 0.85–1.15)
MCH RBC QN AUTO: 30.8 PG (ref 26.5–33)
MCHC RBC AUTO-ENTMCNC: 32.3 G/DL (ref 31.5–36.5)
MCV RBC AUTO: 95 FL (ref 78–100)
PLATELET # BLD AUTO: 69 10E3/UL (ref 150–450)
PROT SERPL-MCNC: 5.6 G/DL (ref 6.8–8.8)
RBC # BLD AUTO: 3.51 10E6/UL (ref 4.4–5.9)
WBC # BLD AUTO: 4.1 10E3/UL (ref 4–11)

## 2022-03-30 PROCEDURE — C1725 CATH, TRANSLUMIN NON-LASER: HCPCS

## 2022-03-30 PROCEDURE — 49083 ABD PARACENTESIS W/IMAGING: CPT

## 2022-03-30 PROCEDURE — 85610 PROTHROMBIN TIME: CPT | Performed by: NURSE PRACTITIONER

## 2022-03-30 PROCEDURE — 999N000132 HC STATISTIC PP CARE STAGE 1

## 2022-03-30 PROCEDURE — 250N000011 HC RX IP 250 OP 636: Performed by: NURSE PRACTITIONER

## 2022-03-30 PROCEDURE — C1874 STENT, COATED/COV W/DEL SYS: HCPCS

## 2022-03-30 PROCEDURE — 250N000009 HC RX 250: Performed by: PHYSICIAN ASSISTANT

## 2022-03-30 PROCEDURE — 36415 COLL VENOUS BLD VENIPUNCTURE: CPT | Performed by: NURSE PRACTITIONER

## 2022-03-30 PROCEDURE — C1769 GUIDE WIRE: HCPCS

## 2022-03-30 PROCEDURE — 255N000002 HC RX 255 OP 636: Performed by: RADIOLOGY

## 2022-03-30 PROCEDURE — 37183 REVISION TIPS: CPT | Mod: GC | Performed by: RADIOLOGY

## 2022-03-30 PROCEDURE — 272N000504 HC NEEDLE CR4

## 2022-03-30 PROCEDURE — 999N000142 HC STATISTIC PROCEDURE PREP ONLY

## 2022-03-30 PROCEDURE — 99152 MOD SED SAME PHYS/QHP 5/>YRS: CPT | Mod: GC | Performed by: RADIOLOGY

## 2022-03-30 PROCEDURE — 250N000011 HC RX IP 250 OP 636: Performed by: PHYSICIAN ASSISTANT

## 2022-03-30 PROCEDURE — 37183 REVISION TIPS: CPT

## 2022-03-30 PROCEDURE — 85027 COMPLETE CBC AUTOMATED: CPT | Performed by: NURSE PRACTITIONER

## 2022-03-30 PROCEDURE — 272N000500 HC NEEDLE CR2

## 2022-03-30 PROCEDURE — 80076 HEPATIC FUNCTION PANEL: CPT | Performed by: NURSE PRACTITIONER

## 2022-03-30 PROCEDURE — 272N000302 HC DEVICE INFLATION CR5

## 2022-03-30 PROCEDURE — 272N000192 HC ACCESSORY CR2

## 2022-03-30 PROCEDURE — 258N000003 HC RX IP 258 OP 636: Performed by: NURSE PRACTITIONER

## 2022-03-30 PROCEDURE — 272N000570 HC SHEATH CR7

## 2022-03-30 PROCEDURE — 49083 ABD PARACENTESIS W/IMAGING: CPT | Mod: GC | Performed by: RADIOLOGY

## 2022-03-30 PROCEDURE — 99152 MOD SED SAME PHYS/QHP 5/>YRS: CPT

## 2022-03-30 PROCEDURE — 272N000117 HC CATH CR2

## 2022-03-30 PROCEDURE — 99153 MOD SED SAME PHYS/QHP EA: CPT

## 2022-03-30 RX ORDER — NALOXONE HYDROCHLORIDE 0.4 MG/ML
0.2 INJECTION, SOLUTION INTRAMUSCULAR; INTRAVENOUS; SUBCUTANEOUS
Status: DISCONTINUED | OUTPATIENT
Start: 2022-03-30 | End: 2022-03-30 | Stop reason: HOSPADM

## 2022-03-30 RX ORDER — IODIXANOL 320 MG/ML
150 INJECTION, SOLUTION INTRAVASCULAR ONCE
Status: COMPLETED | OUTPATIENT
Start: 2022-03-30 | End: 2022-03-30

## 2022-03-30 RX ORDER — LIDOCAINE 40 MG/G
CREAM TOPICAL
Status: DISCONTINUED | OUTPATIENT
Start: 2022-03-30 | End: 2022-03-30 | Stop reason: HOSPADM

## 2022-03-30 RX ORDER — FLUMAZENIL 0.1 MG/ML
0.2 INJECTION, SOLUTION INTRAVENOUS
Status: DISCONTINUED | OUTPATIENT
Start: 2022-03-30 | End: 2022-03-30 | Stop reason: HOSPADM

## 2022-03-30 RX ORDER — NALOXONE HYDROCHLORIDE 0.4 MG/ML
0.4 INJECTION, SOLUTION INTRAMUSCULAR; INTRAVENOUS; SUBCUTANEOUS
Status: DISCONTINUED | OUTPATIENT
Start: 2022-03-30 | End: 2022-03-30 | Stop reason: HOSPADM

## 2022-03-30 RX ORDER — FENTANYL CITRATE 50 UG/ML
25-50 INJECTION, SOLUTION INTRAMUSCULAR; INTRAVENOUS EVERY 5 MIN PRN
Status: DISCONTINUED | OUTPATIENT
Start: 2022-03-30 | End: 2022-03-30 | Stop reason: HOSPADM

## 2022-03-30 RX ORDER — SODIUM CHLORIDE 9 MG/ML
INJECTION, SOLUTION INTRAVENOUS CONTINUOUS
Status: DISCONTINUED | OUTPATIENT
Start: 2022-03-30 | End: 2022-03-30 | Stop reason: HOSPADM

## 2022-03-30 RX ORDER — HEPARIN SODIUM 200 [USP'U]/100ML
1 INJECTION, SOLUTION INTRAVENOUS CONTINUOUS PRN
Status: DISCONTINUED | OUTPATIENT
Start: 2022-03-30 | End: 2022-03-30 | Stop reason: HOSPADM

## 2022-03-30 RX ADMIN — IODIXANOL 20 ML: 320 INJECTION, SOLUTION INTRAVASCULAR at 16:12

## 2022-03-30 RX ADMIN — HEPARIN SODIUM 1 BAG: 200 INJECTION, SOLUTION INTRAVENOUS at 16:12

## 2022-03-30 RX ADMIN — LIDOCAINE HYDROCHLORIDE 10 ML: 10 INJECTION, SOLUTION EPIDURAL; INFILTRATION; INTRACAUDAL; PERINEURAL at 16:13

## 2022-03-30 RX ADMIN — FENTANYL CITRATE 100 MCG: 50 INJECTION INTRAMUSCULAR; INTRAVENOUS at 16:08

## 2022-03-30 RX ADMIN — FENTANYL CITRATE 25 MCG: 50 INJECTION INTRAMUSCULAR; INTRAVENOUS at 15:21

## 2022-03-30 RX ADMIN — MIDAZOLAM 0.5 MG: 1 INJECTION INTRAMUSCULAR; INTRAVENOUS at 15:22

## 2022-03-30 RX ADMIN — MIDAZOLAM 2 MG: 1 INJECTION INTRAMUSCULAR; INTRAVENOUS at 16:08

## 2022-03-30 RX ADMIN — SODIUM CHLORIDE: 9 INJECTION, SOLUTION INTRAVENOUS at 13:44

## 2022-03-30 NOTE — PROCEDURES
[facility]    Procedure: TIPS revision    Date/Time: 3/30/2022 4:14 PM  Performed by: Kang Calles  Authorized by: Kang Calles   IR Fellow Physician: Marli  Other(s) attending procedure: Vivi - staff      UNIVERSAL PROTOCOL   Site Marked: NA  Prior Images Obtained and Reviewed:  Yes  Required items: Required blood products, implants, devices and special equipment available    Patient identity confirmed:  Verbally with patient, arm band, provided demographic data and hospital-assigned identification number  Patient was reevaluated immediately before administering moderate or deep sedation or anesthesia  Confirmation Checklist:  Patient's identity using two indicators, relevant allergies, procedure was appropriate and matched the consent or emergent situation and correct equipment/implants were available  Time out: Immediately prior to the procedure a time out was called    Universal Protocol: the Joint Commission Universal Protocol was followed    Preparation: Patient was prepped and draped in usual sterile fashion       ANESTHESIA    Anesthesia: Local infiltration  Local Anesthetic:  Lidocaine 1% without epinephrine      SEDATION  Patient Sedated: Yes    Sedation Type:  Moderate (conscious) sedation  Sedation:  Fentanyl and midazolam  Vital signs: Vital signs monitored during sedation    See dictated procedure note for full details.  Findings: HV end of TIPS stent was short. Mild TIPS stenosis at HV end.    Specimens: none    Complications: None    Condition: Stable    Plan: Bedrest in 2A. Follow up in 1 month in IR clinic with repeat TIPSS ultrasound prior to clinic visit.       PROCEDURE  Describe Procedure: Successful TIPS venogram showing stenosis at the HV end. TIPS stent was short and did not extend into IVC/RA junction. New 10 mm x 5/2 cm viatorr stent placed in HV end of existing stent with excellent flow at end of case.   Patient Tolerance:  Patient  tolerated the procedure well with no immediate complications  Length of time physician/provider present for 1:1 monitoring during sedation: 45

## 2022-03-30 NOTE — PROGRESS NOTES
Pt tolerated oral intake. Discharge instructions reviewed, copy given to pt. Stent information card given to pt. Pt tolerated ambulation. Voided. PIV dc'd. Pt will discharge home accompanied by wife.

## 2022-03-30 NOTE — PROGRESS NOTES
Prep complete for TIPS Revision. Awaiting lab results. Patients wife Elicia at bedside will transport patient home post procedure cell#[phone].

## 2022-03-30 NOTE — DISCHARGE INSTRUCTIONS
[facility]  Going Home after TIPS Revision and Paracentesis                                                     After you go home:    Drink plenty of fluids.    Resume your normal diet    Do not scrub the procedure site vigorously for 3 days    No lotion or powder to the puncture site for 3 days    DO NOT do any strenuous exercise or lifting greater than 10 pounds for at least 2 days following your procedure    HOB elevated to at least 30 degrees. Do not lay flat for at least 2 hours after you go home.    IF YOU WERE GIVEN SEDATION    No driving or alcoholic beverages today    Do not make any important or legal decisions today    We recommend an adult stay with you for the first 24 hours    CALL THE PHYSICIAN IF:    Monitor neck site for bleeding, swelling. If any bleeding or swelling immediately apply pressure and call the doctor.    If you notice any changes in your voice or breathing you should call your doctor immediately & come to the closest Emergency Department.  Do Not Drive Yourself.    You develop nausea or vomiting    You develop hives or a rash or any unexplained itching    ADDITIONAL INSTRUCTIONS: None    [facility] INTERVENTIONAL RADIOLOGY DEPARTMENT        Procedure Physician:    Dr. Trinidad/Dr. Calles   Date of procedure:March 30, 2022        Telephone numbers:     [phone]  Monday-Friday 8:00 am to 4:30 pm                                              [phone]  After 4:30 pm Monday-Friday, Weekends & Holidays. Ask for the Interventional                                                                Radiologist on call.Someone is available  24 hours/day        [facility] toll free number: [phone]  Monday-Friday 8:00 am to 4:30 pm

## 2022-03-30 NOTE — IR NOTE
Patient Name: Ivan Bell  Medical Record Number: [id number]  Today's Date: 3/30/2022    Procedure: Transjugular intrahepatic portosytemic shunt revision and paracentesis  Proceduralist: MD Vivi.  Pathology present: NA    Procedure Start: 1530  Procedure end: 1615  Sedation medications administered:    Fentanyl 125mcg   Midazolam 2.5mg     Report given to:  RN 2A  : AL    Other Notes: Pt arrived to IR room 1 from . Consent reviewed. Pt denies any questions or concerns regarding procedure. Pt positioned supine and monitored per protocol. Pt tolerated procedure without any noted complications. Pt transferred back to 2A.    Total Paracentesis Fluid removed: 3.5 L

## 2022-03-30 NOTE — PRE-PROCEDURE
GENERAL PRE-PROCEDURE:   Procedure:  TIPS revision and paracentesis    Written consent obtained?: Yes    Risks and benefits: Risks, benefits and alternatives were discussed    Consent given by:  Patient  Patient states understanding of procedure being performed: Yes    Patient's understanding of procedure matches consent: Yes    Procedure consent matches procedure scheduled: Yes    Expected level of sedation:  Moderate  Appropriately NPO:  Yes  ASA Class:  3  Mallampati  :  Grade 1- soft palate, uvula, tonsillar pillars, and posterior pharyngeal wall visible  Lungs:  Lungs clear with good breath sounds bilaterally  Heart:  Normal heart sounds and rate  History & Physical reviewed:  History and physical reviewed and no updates needed  Statement of review:  I have reviewed the lab findings, diagnostic data, medications, and the plan for sedation

## 2022-03-30 NOTE — PROGRESS NOTES
Pt arrived on 2a post TIPS revision and paracentesis. VSS RA. Dressings c/d/i. Family at bedside. Pt eating and drinking.

## 2022-03-30 NOTE — PROGRESS NOTES
Pre pressures:  PVEOS-27  Mid stent-25  Hepatic end of stent-25  RA-13    Post pressures:   PVEOS 26  Mid stent-20  HVEOS-20  RA-15    PARACENTESIS  FLUID 3.5 LITERS

## 2022-04-05 ENCOUNTER — PATIENT OUTREACH (OUTPATIENT)
Dept: GASTROENTEROLOGY | Facility: CLINIC | Age: 57
End: 2022-04-05
Payer: COMMERCIAL

## 2022-04-05 NOTE — PROGRESS NOTES
Patient is s/p TIPS revision on 3/30. He states he is still a bit sore, but overall doing well. Discussed possible issues with hepatic encephalopathy following TIPS revision. Pt states he has noted some of this, and has adjusted his lactulose as needed. Discussed strategies for managing this, and encouraged to call as needed with concerns. Pt also states he has left a message with the CD eval person and will continue to follow up in order to get established with them again. Reviewed upcoming appointments and encouraged pt to attend renal appointment for follow up after kidney stones.

## 2022-04-07 ENCOUNTER — TELEPHONE (OUTPATIENT)
Dept: RADIOLOGY | Facility: CLINIC | Age: 57
End: 2022-04-07
Payer: COMMERCIAL

## 2022-04-07 NOTE — TELEPHONE ENCOUNTER
I called and spoke with Ivan.  He did have some blood in his urine for 2 voids after now is clear, no fluid accumulation in his belly.  He denies LE edema, no HE, he is doing well.  Plan is for 1 month f/up with TIPS ultrasound and visit with Dr Trinidad.  FER Pond, RN, BSN  Interventional Radiology Nurse Coordinator   Phone:  [phone]

## 2022-04-14 ENCOUNTER — TELEPHONE (OUTPATIENT)
Dept: UROLOGY | Facility: CLINIC | Age: 57
End: 2022-04-14

## 2022-04-14 ENCOUNTER — OFFICE VISIT (OUTPATIENT)
Dept: UROLOGY | Facility: CLINIC | Age: 57
End: 2022-04-14
Attending: NURSE PRACTITIONER
Payer: COMMERCIAL

## 2022-04-14 ENCOUNTER — HOSPITAL ENCOUNTER (OUTPATIENT)
Facility: AMBULATORY SURGERY CENTER | Age: 57
End: 2022-04-14
Attending: UROLOGY
Payer: COMMERCIAL

## 2022-04-14 DIAGNOSIS — K70.31 ALCOHOLIC CIRRHOSIS OF LIVER WITH ASCITES (H): ICD-10-CM

## 2022-04-14 DIAGNOSIS — N20.0 LEFT RENAL STONE: ICD-10-CM

## 2022-04-14 DIAGNOSIS — R79.1 ELEVATED INR: ICD-10-CM

## 2022-04-14 DIAGNOSIS — N20.0 RIGHT KIDNEY STONE: Primary | ICD-10-CM

## 2022-04-14 PROCEDURE — 99204 OFFICE O/P NEW MOD 45 MIN: CPT | Performed by: UROLOGY

## 2022-04-14 NOTE — PATIENT INSTRUCTIONS
Surgery Instructions    Always follow your surgeon s instructions. If you don t, your surgery could be cancelled. Please use the following checklist.  Your surgery is on: The surgery scheduler will contact you within 1 week of your consult with the surgeon. If you do not hear from them, please call the clinic or RN Care Coordinator for your provider.    Time: Prearrival times can vary depending on location/type of surgery.  [place] - 2 hour pre-arrival  [facility] - 2 hour pre-arrival  [place] - 1 hour pre-arrival    Note:  These times may change. A nurse will call you before surgery to confirm. If you have not received a call or if you have more questions, please call us on the working day before your surgery:  [place]: [phone] or [phone] (9am to 5:30pm)  [facility]: [phone] (8am to 6pm)  [place]: [phone] (9am to 5pm)  [facility] [phone] (7am to 4pm)  Prior to surgery  Have a pre-op physical exam with your Primary Doctor within 30 days of surgery  Ask your doctor to send all of your results to the surgery center/hospital before surgery. Your doctor also may ask you to bring the results with you on the day of surgery.  Tell your doctor if:  You are allergic to latex or rubber (latex and rubber gloves are often used in medical care).  You are taking any medicines (including aspirin), vitamins, or herbal products. You may need to stop taking some medicines before surgery.  You have any medical problems (allergies, diabetes, or heart disease, for example).  You have a pacemaker or an AICD (automatic implanted cardiac defibrillator). If you do, please bring the ID card with you on the day of surgery.  People who smoke have a higher risk of infection after surgery. Ask your doctor how you can quit smoking.  If you Primary Doctor is not within the [facility] system, you will need to have your pre-op physical faxed to us to be scanned into your chart.  [place]: [phone]  or [phone]  [facility]): [phone]  [facility] ([facility]): [phone]  Schedule to complete pre-procedure COVID-19 Testing    - All patients MUST get tested for COVID-19. Your test needs to happen 2 to 4 days before you check in to the hospital or surgery site.  - A clinic scheduler will call about a week in advance to set up a testing time at one of our labs. We ll take a swab of your nose or throat.  Note: If you go to a clinic or pharmacy like [facility] or [facility] for your test, make sure you get a test inside the nose. Tests inside the nose are:   - A naso-pharyngeal (NP) RT-PCR test   - An anterior nares RT-PCR test  - Do NOT get a  rapid  test or a saliva (spit) test.  - After the test, please stay at home and stay out of contact with other people. It will be harder for you to recover if you get COVID-19 before your treatment.  Call your insurance company. Ask if you need pre-approval for your surgery. If you do not have insurance, please let us know. If you wish to speak to the , please alert the clinic staff so this can be arranged.  Arrange for someone to drive you home after surgery.  will need to be a responsible adult (18 years or older) that will provide transportation to and from surgery and stay in the waiting room during your surgery. You may not drive yourself or take public transportation to and from surgery.  Arrange for someone to stay with you for 24 hours after you go home. This person must be a responsible adult (18 years or older).  Call your surgeon or their nurse if there is any change in your health (cold, flu, infections, hospitalizations).  Do not smoke, drink alcohol, or take over-the-counter medicine for 24 hours before and after surgery.  If you take prescribed drugs, you may need to stop them until after the surgery.  Discuss what medications to take or not take prior to surgery with your Primary Doctor at your pre-op physical.  Avoid over-the-counter blood-thinning medications such as Aspirin, Ibuprofen, vitamin E, or fish oil 7 days prior to surgery (unless otherwise directed by your Primary Doctor). Tylenol is a good alternative for mild pain relief prior to surgery.  Eating and drinking guidelines prior to surgery:  Stop all solid food consumption 8 hours prior to surgery  You may drink clear liquids up to 2 hours prior to surgery (water, fruit juices without pulp, jello, tea/coffee without creamer, sports drinks, clear-fat free broth (bouillon or consomme), popsicles (without milk, bits of fruit, or seeds/nuts)  Follow instructions given for showering or bathing before surgery.    Use 8 ounces of antiseptic surgical soap, like:  Hibiclens, Scrub Care, or Exidine  You can find it at your local pharmacy, clinic, or retail store. If you have trouble, ask your pharmacist to help you find the right substitute.  Please wash with one of the above soaps twice before coming to the hospital for your surgery. This will decrease bacteria (germs) on your skin. It will also help reduce your chance of infection after surgery.  Items you will need for showerin newly washed washcloths  2 newly washed towels  8 ounces of one of the above soaps  Following these instructions:  The evening before surgery: Shower or bathe as you normally would, using your regular soap and a clean washcloth. Give special attention to places where your incision (surgical cut) or catheters will be. This includes your groin area. Rinse well. You may wash your hair with your regular shampoo. Next, wash your body with 4 ounces of the antiseptic soap. Use a clean, damp washcloth and gently clean your body (from the chin down). If your surgery involves your head, use the special soap on your head and scalp. Rinse well and dry off using a newly washed towel.  The morning of surgery: Repeat the same process as the evening shower.  Other suggestions:  Do not shave within 12  inches of your incision (surgical cut) area for at least 3 days before surgery. Shaving can make small cuts in the skin. This puts you at higher risk of infection.  Wear freshly washed pajamas or clothing after your evening shower.  Wear freshly washed clothes the day of surgery.  Wash and change your bed sheets the day before surgery to have clean bed sheets after your shower and when you get home from surgery.  If you have trouble washing all areas, make sure someone helps you.  Don't use any deodorant, lotion or powder after your shower.  Women who are menstruating should wear a fresh sanitary pad to the hospital.  Do not wear or add deodorant, cologne, lotion, makeup, nail polish or jewelry to surgery. If you wear fake nails, please remove at least one nail before coming to surgery (an oxygen monitor needs to be placed on your finger during surgery).  Bring these items to the surgery center/hospital:  Insurance card  Money for parking and co-pays, if needed  A list of all the medicines you take. Include vitamins, minerals, herbs, and over-the-counter drugs.  Note any drug allergies.  A copy of your advance health care directive, if you have one. This tells us what treatment you would want--and who would make health care decisions--if you could no longer speak for yourself. You may request this form in advance or download it from [email or web address].  A case for glasses, contact lenses, hearing aids, or dentures.  Your inhaler or CPAP machine, if you use these at home.  Leave extra cash, jewelry, and other valuables at home.  When you arrive  When you get to the surgery center/hospital, you will:  Check in. If you are under age 18, you must be with a parent or legal guardian.  Sign consent forms, if you haven t already. These forms state that you know the risks and benefits of surgery. When you sign the forms, you give us permission to do the surgery. Do not sign them unless you understand what will happen  during and after your surgery. If you have any questions about your surgery, ask to speak with your doctor before you sign the forms. If you don t understand the answers, ask again.  Receive a copy of the Patient s Bill of Rights. If you do not receive a copy, please ask for one.  Change into hospital clothes. Your belongings will be placed in a bag. We will return them to you after surgery.  Meet with the anesthesia provider. He or she will tell you what kind of anesthesia (medicine) will be used to keep you comfortable during surgery.  Remember: it s okay to remind doctors and nurses to wash their hands before touching you.  In most cases, your surgeon will use a marker to write his or her initials on the surgery site. This ensures that the exact site is operated on.  For safety reasons, we will ask you the same questions many times. For example, we may ask your name and birth date over and over again.  Friends and family can stay with you until it s time for surgery. While you re in surgery, they will be in the waiting area. Please note that cell phones are not allowed in some patient care areas.  If you have questions about what will happen in the operating room, talk to your care team.  After surgery  We will move you to a recovery room, where we will watch you closely. If you have any pain or discomfort, tell your nurse. He or she will try to make you comfortable.  If you are staying overnight, we will move you to your hospital room after you are awake.  If you are going home, we will move you to another room. Friends and family may be able to join you. The length of time you spend in recovery depend on the type of medicine you received, your medical condition, the type of surgery you had, or your response to the anesthesia given during your procedure.  When you are discharged from the recovery room, the nurses will review instructions with you and your caregiver.  Please wash your hands every time you touch  the wound or change bandages or dressings.  Do not submerge the wound in water.  You may not use a bathtub or hot tub until the wound is closed. The wait time frame is generally 2-3 weeks, but any open area can be a source of incoming bacteria, so it is better to be on the safe side and avoid water submersion until your wound is fully healed.  You may take a shower 24 hours after surgery. Double check with your surgeon if it is OK for water to run over the wound, whether it has been sutured, stapled, glued, or is open. You may gently wash the wound using the antiseptic soap provided for your pre-surgery showering (do not use a washcloth). Any mild soap will work as well.  Many surgical wounds will have small white strips of tape on them called steri-strips.  Do not remove these. The edges will curl and fall off within 7-10 days with normal showering.  If you are going home with sutures (stitches) or staples, you must return to the clinic to have them taken out, usually within 1-2 weeks. Some stitches are dissolvable and do not require removal. Make sure to clarify with your surgeon or surgery nurse reviewing discharge paperwork what kind of sutures you have.  Signs and symptoms of infection include:  Fever, temperature over 101.5   F  Redness  Swelling  Increased pain  Green or yellow drainage which may or may not have a foul odor  Dealing with pain  A nurse will check your comfort level often during your stay. He or she will work with you to manage your pain.  Remember:  All pain is real. There are many ways to control pain. We can help you decide what works best for you.  Ask for pain medicine when you need it. Don t try to  tough it out --this can make you feel worse. Always take your medicine as ordered.  Medicine doesn t work the same for everyone. If your medicine isn t working, tell your nurse. There may be other medicines or treatments we can try.  Going home  We will let you know when you re ready to leave  the surgery center or hospital. Before you leave, we will tell you how to care for yourself at home and prevent infections. If you do not understand something, please say so. We will answer any questions you have. We will then help you get ready to leave.  Remember, you must have a responsible adult (18 years or older) to stay with you 24 hours after you leave the hospital.  Take it easy when you get home. You will need some time to recover--you may be more tired than you realize at first. Rest and relax for at least the first 24 hours at home. You ll feel better and heal faster if you take good care of yourself.  Follow the discharge instructions that are given to you when you leave the surgery center or hospital  Please call the clinic if you experience any problems during regular clinic hours (Monday-Friday 8:00am-5:00pm).  If you experience problems during non-clinic hours, please call the [facility] on-call line at [phone] and ask the  to page the on-call Provider for your specialty. The on-call Provider will call you back and can triage your symptoms and further advise. If you are having an emergency, always call 911 or seek immediate evaluation at the Emergency Room.  Locations  [facility]  Same-day surgery center - 2nd floor, check-in #5 [address] [place] [address]  [phone]  [email or web address]

## 2022-04-14 NOTE — PROGRESS NOTES
"Urology Consult History and Physical  [facility]   Name: Ivan Bell    MRN: [id number]   YOB: 1965       We were asked to see Ivan Bell at the request of Jenifer Forde CNP for evaluation and treatment of Bilateral nephrolithiasis.        Chief Complaint:   Bilateral nephrolithiasis    History is obtained from the patient            History of Present Illness:   Ivan Bell is a 56 year old male who is being seen for evaluation of bilateral kidney stones    He has passed kidney stones in the past - \"a couple dozen\"  No prior ER visits for this  No prior surgery for kidney stones  He does have alcoholic cirrhosis - recent TIPS and paracentesis  He does have elevated INR  Sober for the past 4 years               Past Medical History:     Past Medical History:   Diagnosis Date     Alcoholic cirrhosis of liver (H)      Alcoholic hepatitis 03/2019     Chronic kidney disease     CRF     Depression      Gout      History of transfusion      Hypertension      Hypertension      Hypertriglyceridemia      Left calcaneus fracture 01/09/2006 January 16, 2006: Fell 10 feet from ladder onto left foot on frozen ground on 1/9/06 at home.  Immediate pain and unable to walk- seen at [place] and diagnosed with calcaneus fracture     Nephrolithiasis      Osteoarthritis      Portal vein thrombosis     left occlusion, partial main            Past Surgical History:     Past Surgical History:   Procedure Laterality Date     ANKLE SURGERY Left      ANKLE SURGERY       ARTHROSCOPY KNEE       ARTHROSCOPY SHOULDER       ARTHROSCOPY SHOULDER ROTATOR CUFF REPAIR       COLONOSCOPY N/A 03/31/2016    Procedure: COLONOSCOPY;  Surgeon: Rhys Uriostegui MD;  Location:  GI     ESOPHAGOSCOPY, GASTROSCOPY, DUODENOSCOPY (EGD), COMBINED N/A 03/31/2016    Procedure: COMBINED ESOPHAGOSCOPY, GASTROSCOPY, DUODENOSCOPY (EGD);  Surgeon: Rhys Uriostegui MD;  Location:  GI     ESOPHAGOSCOPY, " GASTROSCOPY, DUODENOSCOPY (EGD), COMBINED N/A 2018    Procedure: COMBINED ESOPHAGOSCOPY, GASTROSCOPY, DUODENOSCOPY (EGD), BIOPSY SINGLE OR MULTIPLE;  EGD;  Surgeon: Gonzalo Wahl MD;  Location:  GI     ESOPHAGOSCOPY, GASTROSCOPY, DUODENOSCOPY (EGD), COMBINED N/A 2019    Procedure: ESOPHAGOGASTRODUODENOSCOPY (EGD);  Surgeon: Gonzalo Wahl MD;  Location:  GI     FOOT SURGERY       HIP SURGERY       IR PARACENTESIS  10/29/2019     IR PARACENTESIS  2020     IR PARACENTESIS  2021     IR PARACENTESIS  2022     IR PARACENTESIS  3/30/2022     IR TRANSVEN INTRAHEPATIC PORTOSYST REV  10/29/2019     IR TRANSVEN INTRAHEPATIC PORTOSYST REV  2020     IR TRANSVEN INTRAHEPATIC PORTOSYST REV  2021     IR TRANSVEN INTRAHEPATIC PORTOSYST REV  2022     IR TRANSVEN INTRAHEPATIC PORTOSYST REV  3/30/2022     KNEE SURGERY Left      KNEE SURGERY Right      RELEASE CARPAL TUNNEL       RELEASE TRIGGER FINGER Right 2020    Procedure: RELEASE, TRIGGER FINGER, right ring and long finger;  Surgeon: Pascual Valencia MD;  Location: [facility]     SIGMOIDOSCOPY FLEXIBLE N/A 10/31/2017    Procedure: SIGMOIDOSCOPY FLEXIBLE;;  Surgeon: Armaan Adams MD;  Location:  GI     TIPS Procedure  2018     TIPS PROCEDURE  2020     ZZC PLASTY KNEE,MED OR LAT COMPARTMT Right 2021    Procedure: RIGHT UNICOMPARTMENTAL ARTHROPLASTY KNEE MEDIAL;  Surgeon: José Miguel Landaverde MD;  Location: [facility];  Service: Orthopedics            Social History:     Social History     Tobacco Use     Smoking status: Former Smoker     Packs/day: 0.00     Types: Dip, chew, snus or snuff     Quit date: 1998     Years since quittin.6     Smokeless tobacco: Former User     Types: Chew     Tobacco comment: 1 tin per 10 days.   Substance Use Topics     Alcohol use: No     Alcohol/week: 17.5 standard drinks     Types: 21 Cans of beer per week     Comment: last etoh 16, did  have Odouls ~8/2017       History   Smoking Status     Former Smoker     Packs/day: 0.00     Types: Dip, chew, snus or snuff     Quit date: 8/29/1998   Smokeless Tobacco     Former User     Types: Chew     Comment: 1 tin per 10 days.            Family History:     Family History   Problem Relation Age of Onset     Family History Negative Mother      Family History Negative Father      Hypertension Father      Cerebrovascular Disease Father 87     Breast Cancer Maternal Grandmother      Rheumatoid Arthritis Daughter      Depression Daughter      Substance Abuse Brother      Cancer - colorectal No family hx of      Prostate Cancer No family hx of      Liver Disease No family hx of               Allergies:     Allergies   Allergen Reactions     Prednisone Visual Disturbance     Trazodone Visual Disturbance     Benadryl [Diphenhydramine] Other (See Comments)     Delirium (visual and auditory hallucinations)            Medications:     Current Outpatient Medications   Medication Sig     acetaminophen (TYLENOL) 500 MG tablet Take 1,000 mg by mouth every 6 hours as needed for mild pain      Ascorbic Acid (VITAMIN C PO) Take by mouth daily     benzonatate (TESSALON) 100 MG capsule Take 1 capsule (100 mg) by mouth 3 times daily as needed for cough     eplerenone (INSPRA) 50 MG tablet Take 2 tablets (100 mg) by mouth 2 times daily     fexofenadine (ALLEGRA) 60 MG tablet Take 1 tablet (60 mg) by mouth daily     furosemide (LASIX) 20 MG tablet Take 1 tablet (20 mg) by mouth every evening     furosemide (LASIX) 40 MG tablet Take 1 tablet (40 mg) by mouth every morning     lactulose (CHRONULAC) 10 GM/15ML solution TAKE 30MLS BY MOUTH THREE TIMES DAILY AS NEEDED FOR CONSTIPATION (TAKE AS NEEDED TO MAINTAIN 3 TO 4 BOWEL MOVEMENTS DAILY)     Menaquinone-7 (VITAMIN K2) 100 MCG CAPS Take 200 mcg by mouth daily      MULTIPLE VITAMINS PO Take 1 tablet by mouth daily     ondansetron (ZOFRAN-ODT) 4 MG ODT tab Take 1 tablet (4 mg) by mouth  every 8 hours as needed for nausea     potassium chloride ER (KLOR-CON M) 20 MEQ CR tablet Take 2 tablets (40 mEq) by mouth daily     rifaximin (XIFAXAN) 550 MG TABS tablet Take 1 tablet (550 mg) by mouth 2 times daily     sertraline (ZOLOFT) 50 MG tablet TAKE ONE-HALF TABLET BY MOUTH EVERY DAY     sildenafil (REVATIO) 20 MG tablet Take 3-5 tabs 1/2-4 hours to relations     sodium bicarbonate 650 MG tablet Take 1 tablet (650 mg) by mouth 2 times daily     tamsulosin (FLOMAX) 0.4 MG capsule Take 1 capsule (0.4 mg) by mouth daily     vitamin D3 (CHOLECALCIFEROL) 2000 units (50 mcg) tablet Take 1 tablet by mouth daily     No current facility-administered medications for this visit.             Review of Systems:     Skin: negative  Eyes: negative  Ears/Nose/Throat: negative  Respiratory: No shortness of breath, dyspnea on exertion, cough, or hemoptysis  Cardiovascular: negative  Gastrointestinal: as above  Genitourinary: as above  Musculoskeletal: negative  Neurologic: negative  Psychiatric: negative  Hematologic/Lymphatic/Immunologic: as above  Endocrine: negative          Physical Exam:   No data found.  There is no height or weight on file to calculate BMI.     General: age-appropriate appearing male in NAD  HEENT: Head AT/NC, EOMI, CN Grossly intact  Lungs: no respiratory distress, or pursed lip breathing  Heart: No obvious jugular venous distension present  Back: no bony midline tenderness, no CVAT bilaterally.  Abdomen: soft, non-distended, non-tender. No organomegaly  Lymph: no palpable inguinal lymphadenopathy.  LE: no edema.   Musculoskeltal: extremities normal, no peripheral edema  Skin: no suspicious lesions or rashes  Neuro: Alert, oriented, speech and mentation normal;  moving all 4 extremities equally.  Psych: affect and mood normal          Data:   All laboratory data reviewed:    UA RESULTS:  Recent Labs   Lab Test 02/03/22  0818   COLOR Yellow   APPEARANCE Clear   URINEGLC Negative   URINEBILI Negative    URINEKETONE Negative   SG 1.015   UBLD Small*   URINEPH 6.5   PROTEIN Negative   UROBILINOGEN 1.0   NITRITE Negative   LEUKEST Moderate*   RBCU 5-10*   WBCU *      Lab Results   Component Value Date    CR 1.20 03/07/2022    CR 1.09 06/23/2021     INR   Date Value Ref Range Status   03/30/2022 1.76 (H) 0.85 - 1.15 Final   06/23/2021 1.79 (H) 0.86 - 1.14 Final      IMAGING:  All pertinent imaging reviewed:    All imaging studies reviewed by me.  I personally reviewed these imaging films.  A formal report from radiology will follow.    CT ABD/PEL 2/2/22:  IMPRESSION:   1. Cirrhotic configuration of the liver with moderate abdominal  ascites.  2. Additional evidence of portal venous hypertension including  splenomegaly and gastric esophageal varices, and splenorenal shunting.  3. Prominent loops of colon and small bowel without definable  transition point. Findings could be reactive to ascites versus  developing adynamic ileus. Developing small or large bowel obstruction  is thought to be less likely.  4. Cholelithiasis without cholecystitis.  5. Bilateral nonobstructing renal calculi. There is also a  nonobstructing stone in the right ureter and dependent stone in the  bladder suspected. Correlation with urinalysis and patient's symptoms  suggested.         Impression and Plan:   Impression:   56-year-old man with history of alcoholic cirrhosis status post recent TIPS procedure and paracentesis with elevation of his INR with history of bilateral nephrolithiasis      Plan:   Bilateral nephrolithiasis  - I reviewed his labs which are notable for a normal and stable serum creatinine which improved after his paracentesis  - I reviewed his INR which is notable for elevation at 1.76 most recently.  He does not take anticoagulation and this is the result of his liver cirrhosis  - I reviewed his CT scan from 2/2/2022 and he has significant bilateral nephrolithiasis.  I count at least 7 significant stones on the right  kidney and larger stone burden on the left including a possible lower pole staghorn calculi  -- We discussed treatment options which include:  ---- ESWL: We discussed that given his stone burden and his elevated INR I would not recommend this treatment modality  ---- URETEROSCOPY: we discussed that given his significant stone burden we would need to plan for a staged approach requiring at least 2 if not 3 ureteroscopy procedures for stone clearance.  We discussed we would plan for bilateral ureteroscopy initially, with focus on the right kidney and at least a left ureteral stent placement.  We discussed we would then plan to return 2 weeks later for further ureteroscopy and that there may be a need for a third surgery.  ---- PCNL: We discussed that given his stone size I would normally recommend this for his left stone burden, however given his elevation of his INR he would be at a higher bleeding risk and given that this is secondary to liver cirrhosis this would be challenging to correct and normalize his INR.  - he would like to proceed with ureteroscopy with the plan outlined above  - Order for surgery placed       Thank you for the kind consultation.    Time spent: 30 minutes spent on the date of the encounter doing chart review, history and exam, documentation and further activities as noted above.    Dylan Galaviz MD   Urology  [facility]  [facility] Phone: [phone]  [facility] Phone: [phone]

## 2022-04-14 NOTE — Clinical Note
Hi Dr. Washington and Ms. Forde,  I saw Ivan today in consultation for his bilateral nephrolithiasis.  We discussed the challenges posed by his cirrhosis and elevated INR and that this will require staged ureteroscopy for stone removal rather than a more invasive percutaneous nephrolithotomy.  Please let me know if you have any questions or concerns.  Thanks,  Dylan Galaviz M.D. Cell: [phone]

## 2022-04-14 NOTE — TELEPHONE ENCOUNTER
Called patient and scheduled both surgeries with Dr. Galaviz First procedure scheduled 6/14 and the second one is scheduled for 6/28.     H&P with PCP. Dr. Galaviz will update if needed for the second procedure.     First COVID test scheduled for 6/10, second COVID test scheduled for 6/24.    First post-op scheduled 6/21 for CYSTO stent removal, and second post-op is for 6/30.     Patient will need a surgery packet mailed.

## 2022-04-14 NOTE — NURSING NOTE
Ivan Bell's goals for this visit include:   Chief Complaint   Patient presents with     New Patient     Kidney stones, no pain       He requests these members of his care team be copied on today's visit information:     PCP: Too Washington    Referring Provider:  GILLIAN Lou CNP  [address] [facility] [place] [address]    There were no vitals taken for this visit.    Do you need any medication refills at today's visit?     Holli Sarmiento LPN on 4/14/2022 at 7:50 AM

## 2022-04-15 ENCOUNTER — LAB (OUTPATIENT)
Dept: LAB | Facility: CLINIC | Age: 57
End: 2022-04-15
Attending: RADIOLOGY
Payer: COMMERCIAL

## 2022-04-15 ENCOUNTER — ANCILLARY PROCEDURE (OUTPATIENT)
Dept: ULTRASOUND IMAGING | Facility: CLINIC | Age: 57
End: 2022-04-15
Attending: RADIOLOGY
Payer: COMMERCIAL

## 2022-04-15 DIAGNOSIS — K70.31 ALCOHOLIC CIRRHOSIS OF LIVER WITH ASCITES (H): ICD-10-CM

## 2022-04-15 LAB
ALBUMIN SERPL-MCNC: 1.9 G/DL (ref 3.4–5)
ALP SERPL-CCNC: 148 U/L (ref 40–150)
ALT SERPL W P-5'-P-CCNC: 19 U/L (ref 0–70)
ANION GAP SERPL CALCULATED.3IONS-SCNC: 8 MMOL/L (ref 3–14)
AST SERPL W P-5'-P-CCNC: 42 U/L (ref 0–45)
BILIRUB SERPL-MCNC: 2.8 MG/DL (ref 0.2–1.3)
BUN SERPL-MCNC: 9 MG/DL (ref 7–30)
CALCIUM SERPL-MCNC: 7.9 MG/DL (ref 8.5–10.1)
CHLORIDE BLD-SCNC: 114 MMOL/L (ref 94–109)
CO2 SERPL-SCNC: 26 MMOL/L (ref 20–32)
CREAT SERPL-MCNC: 1.19 MG/DL (ref 0.66–1.25)
ERYTHROCYTE [DISTWIDTH] IN BLOOD BY AUTOMATED COUNT: 18 % (ref 10–15)
GFR SERPL CREATININE-BSD FRML MDRD: 71 ML/MIN/1.73M2
GLUCOSE BLD-MCNC: 89 MG/DL (ref 70–99)
HCT VFR BLD AUTO: 30.8 % (ref 40–53)
HGB BLD-MCNC: 10 G/DL (ref 13.3–17.7)
INR PPP: 1.87 (ref 0.85–1.15)
MCH RBC QN AUTO: 30.7 PG (ref 26.5–33)
MCHC RBC AUTO-ENTMCNC: 32.5 G/DL (ref 31.5–36.5)
MCV RBC AUTO: 95 FL (ref 78–100)
PLATELET # BLD AUTO: 62 10E3/UL (ref 150–450)
POTASSIUM BLD-SCNC: 3.8 MMOL/L (ref 3.4–5.3)
PROT SERPL-MCNC: 4.9 G/DL (ref 6.8–8.8)
RBC # BLD AUTO: 3.26 10E6/UL (ref 4.4–5.9)
SODIUM SERPL-SCNC: 148 MMOL/L (ref 133–144)
WBC # BLD AUTO: 3.6 10E3/UL (ref 4–11)

## 2022-04-15 PROCEDURE — 85027 COMPLETE CBC AUTOMATED: CPT | Performed by: PATHOLOGY

## 2022-04-15 PROCEDURE — 93975 VASCULAR STUDY: CPT | Performed by: RADIOLOGY

## 2022-04-15 PROCEDURE — 80053 COMPREHEN METABOLIC PANEL: CPT | Performed by: PATHOLOGY

## 2022-04-15 PROCEDURE — 85610 PROTHROMBIN TIME: CPT | Performed by: PATHOLOGY

## 2022-04-15 PROCEDURE — 36415 COLL VENOUS BLD VENIPUNCTURE: CPT | Performed by: PATHOLOGY

## 2022-04-18 NOTE — TELEPHONE ENCOUNTER
DATE:  12/31/2019   TIME OF RECEIPT FROM LAB:  2:20 PM  LAB TEST:  potassium  LAB VALUE:  2.5  RESULTS GIVEN WITH READ-BACK TO (PROVIDER):  Dr. Washington via [employer], and message on providers desk  TIME LAB VALUE REPORTED TO PROVIDER:   2:21 PM  Griselda Rush RN       3 weeks

## 2022-04-19 ENCOUNTER — TELEPHONE (OUTPATIENT)
Dept: VASCULAR SURGERY | Facility: CLINIC | Age: 57
End: 2022-04-19
Payer: COMMERCIAL

## 2022-04-19 ENCOUNTER — PATIENT OUTREACH (OUTPATIENT)
Dept: GASTROENTEROLOGY | Facility: CLINIC | Age: 57
End: 2022-04-19
Payer: COMMERCIAL

## 2022-04-19 NOTE — PROGRESS NOTES
Patient calling in response to message left by  that his June 8 appointment with Dr. Bales will need to be rescheduled. Spot placed on hold for him with Dr. Bales on Monday 6/13. Patient also reports he saw the urology MD on 4/14. Because he still has a significant number of stones remaining in both kidneys, he will be having surgery to have the stones removed. These procedures are scheduled on 6/14 and 6/28. Patient also reports he was able to get connected with a treatment program, and he has been attending these. Patient states that he is otherwise doing well and has no further issues at this time.

## 2022-04-19 NOTE — TELEPHONE ENCOUNTER
Called and spoke with Pt.  He noted there have been no changes in his sxs.  No additional episodes of hematuria.  He is just inquiring on results. Informed him message would be sent to Dr Trinidad to have him review and Pt would receive a call back with recommendations. Pt verbalized understanding, agreed to current plan and denied any further questions.    After speaking with Dr Trinidad, called and LVM for Pt informing him Dr Trinidad reviewed results and noted there is some ascites noted and his TIPS velocities are elevated, but improved from previous.  Overall stable US.  Provider will discuss further with Pt at scheduled F/U.  Requested return call to discuss further if needed.  [facility] direct line provided.     Ann-Marie Wells LPN

## 2022-04-21 ENCOUNTER — HOSPITAL ENCOUNTER (EMERGENCY)
Facility: CLINIC | Age: 57
Discharge: HOME | End: 2022-04-21
Attending: PHYSICIAN ASSISTANT | Admitting: PHYSICIAN ASSISTANT
Payer: COMMERCIAL

## 2022-04-21 ENCOUNTER — APPOINTMENT (OUTPATIENT)
Dept: GENERAL RADIOLOGY | Facility: CLINIC | Age: 57
End: 2022-04-21
Attending: PHYSICIAN ASSISTANT
Payer: COMMERCIAL

## 2022-04-21 VITALS
HEART RATE: 77 BPM | WEIGHT: 165 LBS | DIASTOLIC BLOOD PRESSURE: 68 MMHG | RESPIRATION RATE: 16 BRPM | HEIGHT: 70 IN | BODY MASS INDEX: 23.62 KG/M2 | TEMPERATURE: 97.1 F | SYSTOLIC BLOOD PRESSURE: 130 MMHG | OXYGEN SATURATION: 100 %

## 2022-04-21 DIAGNOSIS — S61.411A LACERATION OF MULTIPLE SITES OF RIGHT HAND AND FINGERS, INITIAL ENCOUNTER: ICD-10-CM

## 2022-04-21 DIAGNOSIS — S61.219A LACERATION OF MULTIPLE SITES OF RIGHT HAND AND FINGERS, INITIAL ENCOUNTER: ICD-10-CM

## 2022-04-21 DIAGNOSIS — S67.21XA CRUSHING INJURY OF RIGHT HAND, INITIAL ENCOUNTER: ICD-10-CM

## 2022-04-21 DIAGNOSIS — L08.9 LOCAL INFECTION OF SKIN AND SUBCUTANEOUS TISSUE: ICD-10-CM

## 2022-04-21 PROCEDURE — G0463 HOSPITAL OUTPT CLINIC VISIT: HCPCS | Performed by: PHYSICIAN ASSISTANT

## 2022-04-21 PROCEDURE — 73130 X-RAY EXAM OF HAND: CPT | Mod: RT

## 2022-04-21 PROCEDURE — 99214 OFFICE O/P EST MOD 30 MIN: CPT | Performed by: PHYSICIAN ASSISTANT

## 2022-04-21 RX ORDER — CEPHALEXIN 500 MG/1
500 CAPSULE ORAL 4 TIMES DAILY
Qty: 28 CAPSULE | Refills: 0 | Status: SHIPPED | OUTPATIENT
Start: 2022-04-21 | End: 2022-04-28

## 2022-04-21 NOTE — ED PROVIDER NOTES
History     Chief Complaint   Patient presents with     Wound Infection     Dropped a trailer on his right hand on Saturday now having more pain and swelling     HPI  Ivan Bell is a 56 year old male who presents with complaints of right hand pain and swelling since an injury 5 days ago.  Patient states he accidentally dropped a trailer hitch on his hand and it was crushed against the concrete floor.  The trailer had a bobcat loaded on it.  Patient sustained wounds to the dorsal aspect of all his right fingers that have now scabbed over.  He states he has had progressive right hand swelling and pain since the injury and has noticed increased surrounding redness to the wounds.  No purulent drainage from the wounds.  He is moving his fingers without difficulties but is somewhat limited due to swelling of the fingers.  He states he has continued to use his hand as he is right-hand dominant.  Patient has been icing the hand.  Denies fevers or chills.  Patient's last tetanus was in 2020.      Allergies:  Allergies   Allergen Reactions     Prednisone Visual Disturbance     Trazodone Visual Disturbance     Benadryl [Diphenhydramine] Other (See Comments)     Delirium (visual and auditory hallucinations)       Problem List:    Patient Active Problem List    Diagnosis Date Noted     Cervicalgia 04/13/2021     Priority: Medium     Acute low back pain 04/13/2021     Priority: Medium     Thrombocytopenia (H) 06/05/2020     Priority: Medium     CKD (chronic kidney disease) stage 2, GFR 60-89 ml/min 04/17/2020     Priority: Medium     Seasonal allergic rhinitis, unspecified trigger 04/17/2020     Priority: Medium     Nephrolithiasis 09/23/2018     Priority: Medium     September 23, 2018 non-obstructing, incident finding on us.        Alcoholic cirrhosis of liver with ascites (H) 03/08/2016     Priority: Medium     September 23, 2018 now sober, ascites resolved, S/P TIP procedure 6/2018. Normal liver enzymes, diminished  liver function.  INR 1.6, bilirubin 2.5, albumin 2.6. He  appears healthy. Doing well. Stressed the importance of abstaining from alcohol. See copy of recent us.     9/10/18:  Impression:   1. Patent TIPS with appropriate in stent velocities. Normal Doppler  evaluation of the remainder of the hepatic vasculature in the setting  of TIPS.  2. Cirrhotic hepatic morphology with evidence of portal hypertension,  including splenomegaly. No focal hepatic mass.  3. Cholelithiasis without evidence of cholecystitis.  4. Bilateral nonobstructing nephrolithiasis.            Hypertension goal BP (blood pressure) < 140/90 12/18/2012     Priority: Medium     24 hour contact given to patient  01/02/2012     Priority: Medium     EMERGENCY CARE PLAN  Presenting Problem Signs and Symptoms Treatment Plan    Questions or conerns during clinic hours    I will call the clinic directly     Questions or conerns outside clinic hours    I will call the 24 hour nurse line at [phone]    Patient needs to schedule an appointment    I will call the 24 hour scheduling team at [phone] or clinic directly    Same day treatment     I will call the clinic first, nurse line if after hours, urgent care and express care if needed                                 CARDIOVASCULAR SCREENING; LDL GOAL LESS THAN 130 10/31/2010     Priority: Medium     Erectile dysfunction 01/29/2008     Priority: Medium     September 23, 2018 no known CAD, no contraindications to sildenafil.        Gouty arthropathy 12/31/2006     Priority: Medium     Problem list name updated by automated process. Provider to review and confirm  [employer] Update utility       Hypertriglyceridemia 08/03/2005     Priority: Medium     Last Exam: December 19, 2007  Last Lipids: CHOL      211   01/25/2007 LDL      104   01/25/2007 HDL       83   01/25/2007  Last LFTs:: AST      106   01/25/2007   ALT       53   01/25/2007    TRIG      120   01/25/2007  TRIG      115   01/16/2006  Meds: Lopid 600  bid - tolerating          Past Medical History:    Past Medical History:   Diagnosis Date     Alcoholic cirrhosis of liver (H)      Alcoholic hepatitis 03/2019     Chronic kidney disease      Depression      Gout      History of transfusion      Hypertension      Hypertension      Hypertriglyceridemia      Left calcaneus fracture 01/09/2006     Nephrolithiasis      Osteoarthritis      Portal vein thrombosis        Past Surgical History:    Past Surgical History:   Procedure Laterality Date     ANKLE SURGERY Left      ANKLE SURGERY       ARTHROSCOPY KNEE       ARTHROSCOPY SHOULDER       ARTHROSCOPY SHOULDER ROTATOR CUFF REPAIR       COLONOSCOPY N/A 03/31/2016    Procedure: COLONOSCOPY;  Surgeon: Rhys Uriostegui MD;  Location:  GI     ESOPHAGOSCOPY, GASTROSCOPY, DUODENOSCOPY (EGD), COMBINED N/A 03/31/2016    Procedure: COMBINED ESOPHAGOSCOPY, GASTROSCOPY, DUODENOSCOPY (EGD);  Surgeon: Rhys Uriostegui MD;  Location:  GI     ESOPHAGOSCOPY, GASTROSCOPY, DUODENOSCOPY (EGD), COMBINED N/A 03/09/2018    Procedure: COMBINED ESOPHAGOSCOPY, GASTROSCOPY, DUODENOSCOPY (EGD), BIOPSY SINGLE OR MULTIPLE;  EGD;  Surgeon: Gonzalo Wahl MD;  Location:  GI     ESOPHAGOSCOPY, GASTROSCOPY, DUODENOSCOPY (EGD), COMBINED N/A 06/07/2019    Procedure: ESOPHAGOGASTRODUODENOSCOPY (EGD);  Surgeon: Gonzalo Wahl MD;  Location:  GI     FOOT SURGERY       HIP SURGERY       IR PARACENTESIS  10/29/2019     IR PARACENTESIS  11/25/2020     IR PARACENTESIS  8/11/2021     IR PARACENTESIS  1/28/2022     IR PARACENTESIS  3/30/2022     IR TRANSVEN INTRAHEPATIC PORTOSYST REV  10/29/2019     IR TRANSVEN INTRAHEPATIC PORTOSYST REV  11/25/2020     IR TRANSVEN INTRAHEPATIC PORTOSYST REV  8/11/2021     IR TRANSVEN INTRAHEPATIC PORTOSYST REV  1/28/2022     IR TRANSVEN INTRAHEPATIC PORTOSYST REV  3/30/2022     KNEE SURGERY Left      KNEE SURGERY Right      RELEASE CARPAL TUNNEL       RELEASE TRIGGER FINGER  Right 2020    Procedure: RELEASE, TRIGGER FINGER, right ring and long finger;  Surgeon: Pascual Valencia MD;  Location: [facility]     SIGMOIDOSCOPY FLEXIBLE N/A 10/31/2017    Procedure: SIGMOIDOSCOPY FLEXIBLE;;  Surgeon: Armaan Adams MD;  Location: [facility]     TIPS Procedure  2018     TIPS PROCEDURE  2020     ZZC PLASTY KNEE,MED OR LAT COMPARTMT Right 2021    Procedure: RIGHT UNICOMPARTMENTAL ARTHROPLASTY KNEE MEDIAL;  Surgeon: José Miguel Landaverde MD;  Location: [facility];  Service: Orthopedics       Family History:    Family History   Problem Relation Age of Onset     Family History Negative Mother      Family History Negative Father      Hypertension Father      Cerebrovascular Disease Father 87     Breast Cancer Maternal Grandmother      Rheumatoid Arthritis Daughter      Depression Daughter      Substance Abuse Brother      Cancer - colorectal No family hx of      Prostate Cancer No family hx of      Liver Disease No family hx of        Social History:  Marital Status:   [2]  Social History     Tobacco Use     Smoking status: Former Smoker     Packs/day: 0.00     Types: Dip, chew, snus or snuff     Quit date: 1998     Years since quittin.6     Smokeless tobacco: Former User     Types: Chew     Tobacco comment: 1 tin per 10 days.   Vaping Use     Vaping Use: Never used   Substance Use Topics     Alcohol use: No     Alcohol/week: 17.5 standard drinks     Types: 21 Cans of beer per week     Comment: last etoh 16, did have Odouls ~2017     Drug use: No        Medications:    cephALEXin (KEFLEX) 500 MG capsule  acetaminophen (TYLENOL) 500 MG tablet  Ascorbic Acid (VITAMIN C PO)  benzonatate (TESSALON) 100 MG capsule  eplerenone (INSPRA) 50 MG tablet  fexofenadine (ALLEGRA) 60 MG tablet  furosemide (LASIX) 20 MG tablet  furosemide (LASIX) 40 MG tablet  lactulose (CHRONULAC) 10 GM/15ML solution  Menaquinone-7 (VITAMIN K2) 100 MCG CAPS  MULTIPLE VITAMINS  "PO  ondansetron (ZOFRAN-ODT) 4 MG ODT tab  potassium chloride ER (KLOR-CON M) 20 MEQ CR tablet  rifaximin (XIFAXAN) 550 MG TABS tablet  sertraline (ZOLOFT) 50 MG tablet  sildenafil (REVATIO) 20 MG tablet  sodium bicarbonate 650 MG tablet  tamsulosin (FLOMAX) 0.4 MG capsule  vitamin D3 (CHOLECALCIFEROL) 2000 units (50 mcg) tablet          Review of Systems   Constitutional: Negative.  Negative for fever.   Musculoskeletal:        Right hand pain and swelling   Skin:        Scabbed over wounds to right hand   All other systems reviewed and are negative.      Physical Exam   BP: 130/68  Pulse: 77  Temp: 97.1  F (36.2  C)  Resp: 16  Height: 177.8 cm (5' 10\")  Weight: 74.8 kg (165 lb) (stated)  SpO2: 100 %      Physical Exam  Constitutional:       General: He is not in acute distress.     Appearance: Normal appearance. He is well-developed. He is not ill-appearing, toxic-appearing or diaphoretic.   HENT:      Head: Normocephalic and atraumatic.      Nose: Nose normal.   Eyes:      Conjunctiva/sclera: Conjunctivae normal.   Cardiovascular:      Pulses: Normal pulses.   Pulmonary:      Effort: Pulmonary effort is normal.   Musculoskeletal:         General: Normal range of motion.      Right forearm: Normal. No swelling or bony tenderness.      Right wrist: Normal. No swelling, tenderness, bony tenderness or snuff box tenderness. Normal range of motion.      Right hand: Swelling and tenderness present. Normal range of motion. Normal strength. Normal sensation. There is no disruption of two-point discrimination. Normal capillary refill. Normal pulse.      Cervical back: Neck supple.      Comments: Scabbed over abrasions to dorsal proximal right index through ring fingers.  No purulent drainage.  There is mild surrounding erythema around the wounds.  Patient has full range of motion of fingers.  No streaking erythema.  Patient has swelling and tenderness across the right metacarpals.   Skin:     General: Skin is warm and dry. "      Capillary Refill: Capillary refill takes less than 2 seconds.   Neurological:      General: No focal deficit present.      Mental Status: He is alert.      Sensory: Sensation is intact.      Motor: Motor function is intact.         ED Course                 Procedures        No results found. However, due to the size of the patient record, not all encounters were searched. Please check Results Review for a complete set of results.     Results for orders placed or performed during the hospital encounter of 04/21/22   XR Hand Right G/E 3 Views     Status: None    Narrative    HAND RIGHT THREE OR MORE VIEWS    4/21/2022 12:48 PM     HISTORY: Right hand pain after crush injury on Saturday.    COMPARISON: None.      Impression    IMPRESSION: No acute fracture. Scattered DIP degenerative changes.  Otherwise unremarkable.    NELIA MIRELES MD         SYSTEM ID:  [id number]       Medications - No data to display    Assessments & Plan (with Medical Decision Making)     Pt is a 56 year old male who presents with complaints of right hand pain and swelling since an injury 5 days ago.  Patient states he accidentally dropped a trailer hitch on his hand and it was crushed against the concrete floor.  The trailer had a bobcat loaded on it.  Patient sustained wounds to the dorsal aspect of all his right fingers that have now scabbed over.  He states he has had progressive right hand swelling and pain since the injury and has noticed increased surrounding redness to the wounds.  Patient's last tetanus was in 2020.    Pt is afebrile on arrival.  Exam as above.  X-rays of right hand were negative for fracture.  Discussed results with patient.  Will place on oral antibiotic given surrounding erythema that has developed around the scabbed over sites.  Encouraged continued symptomatic treatments at home.  Return precautions were reviewed.  Hand-outs were provided.    Patient was sent with Keflex and was instructed to follow-up with  PCP in 3-5 days for continued care and management or sooner if new or worsening symptoms.  He is to return to the ED for persistent and/or worsening symptoms.  Patient expressed understanding of the diagnosis and plan and was discharged home in good condition.    I have reviewed the nursing notes.    I have reviewed the findings, diagnosis, plan and need for follow up with the patient.    Discharge Medication List as of 4/21/2022  2:04 PM      START taking these medications    Details   cephALEXin (KEFLEX) 500 MG capsule Take 1 capsule (500 mg) by mouth 4 times daily for 7 days, Disp-28 capsule, R-0, E-Prescribe             Final diagnoses:   Crushing injury of right hand, initial encounter   Laceration of multiple sites of right hand and fingers, initial encounter   Local infection of skin and subcutaneous tissue       4/21/2022   [facility] EMERGENCY DEPT      Disclaimer:  This note consists of symbols derived from keyboarding, dictation and/or voice recognition software.  As a result, there may be errors in the script that have gone undetected.  Please consider this when interpreting information found in this chart.     Makenna Templeton PA-C  04/22/22 1841       Makenna Templeton PA-C  04/22/22 1842

## 2022-04-22 ASSESSMENT — ENCOUNTER SYMPTOMS
CONSTITUTIONAL NEGATIVE: 1
FEVER: 0

## 2022-04-24 NOTE — ED NOTES
IV attempted, unsuccessful x 2   Left message for patient to call clinic   Pending Prescriptions:                       Disp   Refills    metFORMIN (GLUCOPHAGE-XR) 500 MG 24 hr ta*360 ta*0            Sig: TAKE 2 TABLETS(1000 MG) BY MOUTH TWICE DAILY WITH           MEALS    Medication is being filled for 1 time luís refill only due to:  Patient is due for diabetes follow up    Please call and help schedule.  Thank you!    Kianna Gipson RN on 5/14/2021 at 7:26 AM     This was a shared visit with the JOELLE. I reviewed and verified the documentation and independently performed the documented:

## 2022-04-26 ENCOUNTER — PATIENT OUTREACH (OUTPATIENT)
Dept: GASTROENTEROLOGY | Facility: CLINIC | Age: 57
End: 2022-04-26
Payer: COMMERCIAL

## 2022-04-26 NOTE — PROGRESS NOTES
"Patient calling for assistance in scheduling a paracentesis. He states he doesn't feel overly full, but is uncomfortable and has some abdominal distention. Ultrasound done 4/15 notes \"large volume ascites\". Unable to find a paracentesis appointment within the [employer] system until 5/6/22. Patient prefers this week if able, so scheduled at [facility] tomorrow 4/27 with check in at 0645. Patient agreeale to this. Patient also notes he injured his hand recently, with significant bruising still visible but no broken bones. He continues to work and is overall doing okay per his report. He has no further concerns at the moment.   "

## 2022-04-27 ENCOUNTER — PATIENT OUTREACH (OUTPATIENT)
Dept: GASTROENTEROLOGY | Facility: CLINIC | Age: 57
End: 2022-04-27
Payer: COMMERCIAL

## 2022-04-29 NOTE — TELEPHONE ENCOUNTER
Patient called to report 2500 ml removed at paracentesis today. He is feeling much better and pleased with the lower amount. He has upcoming visit with Dr. Trinidad in IR to follow up on TIPS on 5/3. Patient otherwise doing well and has no other concerns.

## 2022-05-02 ENCOUNTER — TELEPHONE (OUTPATIENT)
Dept: GASTROENTEROLOGY | Facility: CLINIC | Age: 57
End: 2022-05-02
Payer: COMMERCIAL

## 2022-05-02 NOTE — TELEPHONE ENCOUNTER
Prior Authorization Retail Medication Request    Medication/Dose: Xifaxan 550mg  ICD code (if different than what is on RX):    Previously Tried and Failed:    Rationale:  PA Required call [phone]  Drug Requires Prior Authorization  Lower Cost Alts: azithromycin OR  ciprofloxacin    Insurance Name:  [facility] [employer]  Insurance ID:  [id number]      Pharmacy Information (if different than what is on RX)  Name:    Phone:            Thank you,  [facility] [place]  [phone]

## 2022-05-03 ENCOUNTER — VIRTUAL VISIT (OUTPATIENT)
Dept: VASCULAR SURGERY | Facility: CLINIC | Age: 57
End: 2022-05-03
Attending: RADIOLOGY
Payer: COMMERCIAL

## 2022-05-03 ENCOUNTER — TELEPHONE (OUTPATIENT)
Dept: VASCULAR SURGERY | Facility: CLINIC | Age: 57
End: 2022-05-03
Payer: COMMERCIAL

## 2022-05-03 DIAGNOSIS — Z95.828 S/P TIPS (TRANSJUGULAR INTRAHEPATIC PORTOSYSTEMIC SHUNT): Primary | ICD-10-CM

## 2022-05-03 PROCEDURE — 99213 OFFICE O/P EST LOW 20 MIN: CPT | Mod: TEL | Performed by: RADIOLOGY

## 2022-05-03 NOTE — PATIENT INSTRUCTIONS
You were seen today in the Vascular IR Clinic by Dr Trinidad for follow up regarding TIPS.    Plan:    - Repeat abdominal US and labs (non-fasting) in 3 months prior to seeing Dr Trinidad.  - Follow up with Dr Trinidad in 3 months with testing prior.     Please call or send a MyChart with any questions or concerns.    Ann-Marie OBRIEN LPN/ Jade ARGUETA RN  [phone]

## 2022-05-03 NOTE — TELEPHONE ENCOUNTER
Called and LVM for Pt requesting return call to clinic to discuss appt for today.  Dr Trinidad had something last minute come up where he needs to end his afternoon clinic early.  Inquired if Pt would be able to move appt to 09:40 AM.  Clinic telephone provided.     Ann-Marie Wells LPN

## 2022-05-03 NOTE — PROGRESS NOTES
"[facility] Vascular      Type of Visit: Virtual: Other: [phone]    Patient is here for a return visit to discuss F/U TIPS.     Vitals - Patient Reported  Weight (Patient Reported): 165 lb  Height (Patient Reported): 5' 10\"  BMI (Based on Pt Reported Ht/Wt): 23.67  Pain Score: No Pain (0)    Questions patient would like addressed today are: Patent's verbalized questions/concerns regarding sweating at night. . This has been communicated to the provider.     Refills are needed: No    How would you like to obtain your AVS? Frances Simmons       Telephone Visit: 5 min      INTERVENTIONAL RADIOLOGY CLINIC VISIT - FOLLOW UP     Name: Ivan Bell  Age: 56 year old   Referral: Dr. Bales         HPI: Ivan Bell is a 56 year old with history of cirrhosis 2/2 EtOH Who is coming for 1-month follow up post TIPS revision. He is s/p esophageal variceal banding, TIPS placement 6/2018 and multiple TIPS revisions d/t recurrent ascites. He was previously a patient of Dr. Elaine. S    Post TIPS revision with an extension of the stent, he had a few days of bad abdominal pain. However, he is doing much better now. His last Paracentesis was last Wednesday where he drained only 2.5 L, which is much better than before. He feels that he doesn't need any para at this point and he is scheduled to check in two weeks from now.  Appetite is good. His main complaint is increased night sweating.  I looked at his imaging and Labs.  The US (15 days ago) shows some improvement of the portal venous end velocities but still high velocity at the mid to proximal stent.  Labs: High Bili and low Albumin (4?15/22). No recent lab is available.      Current Outpatient Medications   Medication     acetaminophen (TYLENOL) 500 MG tablet     Ascorbic Acid (VITAMIN C PO)     benzonatate (TESSALON) 100 MG capsule     eplerenone (INSPRA) 50 MG tablet     fexofenadine (ALLEGRA) 60 MG tablet     furosemide (LASIX) 20 MG tablet     furosemide " (LASIX) 40 MG tablet     lactulose (CHRONULAC) 10 GM/15ML solution     Menaquinone-7 (VITAMIN K2) 100 MCG CAPS     MULTIPLE VITAMINS PO     ondansetron (ZOFRAN-ODT) 4 MG ODT tab     potassium chloride ER (KLOR-CON M) 20 MEQ CR tablet     rifaximin (XIFAXAN) 550 MG TABS tablet     sertraline (ZOLOFT) 50 MG tablet     sildenafil (REVATIO) 20 MG tablet     sodium bicarbonate 650 MG tablet     tamsulosin (FLOMAX) 0.4 MG capsule     vitamin D3 (CHOLECALCIFEROL) 2000 units (50 mcg) tablet     No current facility-administered medications for this visit.     Past Medical History:   Diagnosis Date     Alcoholic cirrhosis of liver (H)      Alcoholic hepatitis 03/2019     Chronic kidney disease     CRF     Depression      Gout      History of transfusion      Hypertension      Hypertension      Hypertriglyceridemia      Left calcaneus fracture 01/09/2006 January 16, 2006: Fell 10 feet from ladder onto left foot on frozen ground on 1/9/06 at home.  Immediate pain and unable to walk- seen at [place] and diagnosed with calcaneus fracture     Nephrolithiasis      Osteoarthritis      Portal vein thrombosis     left occlusion, partial main       Past Surgical History:   Procedure Laterality Date     ANKLE SURGERY Left      ANKLE SURGERY       ARTHROSCOPY KNEE       ARTHROSCOPY SHOULDER       ARTHROSCOPY SHOULDER ROTATOR CUFF REPAIR       COLONOSCOPY N/A 03/31/2016    Procedure: COLONOSCOPY;  Surgeon: Rhys Uriostegui MD;  Location:  GI     ESOPHAGOSCOPY, GASTROSCOPY, DUODENOSCOPY (EGD), COMBINED N/A 03/31/2016    Procedure: COMBINED ESOPHAGOSCOPY, GASTROSCOPY, DUODENOSCOPY (EGD);  Surgeon: Rhys Uriostegui MD;  Location:  GI     ESOPHAGOSCOPY, GASTROSCOPY, DUODENOSCOPY (EGD), COMBINED N/A 03/09/2018    Procedure: COMBINED ESOPHAGOSCOPY, GASTROSCOPY, DUODENOSCOPY (EGD), BIOPSY SINGLE OR MULTIPLE;  EGD;  Surgeon: Gnozalo Wahl MD;  Location:  GI     ESOPHAGOSCOPY, GASTROSCOPY, DUODENOSCOPY  (EGD), COMBINED N/A 2019    Procedure: ESOPHAGOGASTRODUODENOSCOPY (EGD);  Surgeon: Gonzalo Wahl MD;  Location:  GI     FOOT SURGERY       HIP SURGERY       IR PARACENTESIS  10/29/2019     IR PARACENTESIS  2020     IR PARACENTESIS  2021     IR PARACENTESIS  2022     IR PARACENTESIS  3/30/2022     IR TRANSVEN INTRAHEPATIC PORTOSYST REV  10/29/2019     IR TRANSVEN INTRAHEPATIC PORTOSYST REV  2020     IR TRANSVEN INTRAHEPATIC PORTOSYST REV  2021     IR TRANSVEN INTRAHEPATIC PORTOSYST REV  2022     IR TRANSVEN INTRAHEPATIC PORTOSYST REV  3/30/2022     KNEE SURGERY Left      KNEE SURGERY Right      RELEASE CARPAL TUNNEL       RELEASE TRIGGER FINGER Right 2020    Procedure: RELEASE, TRIGGER FINGER, right ring and long finger;  Surgeon: Pascual Valencia MD;  Location: [facility] OR     SIGMOIDOSCOPY FLEXIBLE N/A 10/31/2017    Procedure: SIGMOIDOSCOPY FLEXIBLE;;  Surgeon: Armaan Adams MD;  Location:  GI     TIPS Procedure  2018     TIPS PROCEDURE  2020     ZZC PLASTY KNEE,MED OR LAT COMPARTMT Right 2021    Procedure: RIGHT UNICOMPARTMENTAL ARTHROPLASTY KNEE MEDIAL;  Surgeon: José Miguel Landaverde MD;  Location: [facility];  Service: Orthopedics       Family History   Problem Relation Age of Onset     Family History Negative Mother      Family History Negative Father      Hypertension Father      Cerebrovascular Disease Father 87     Breast Cancer Maternal Grandmother      Rheumatoid Arthritis Daughter      Depression Daughter      Substance Abuse Brother      Cancer - colorectal No family hx of      Prostate Cancer No family hx of      Liver Disease No family hx of        Social History     Tobacco Use     Smoking status: Former Smoker     Packs/day: 0.00     Types: Dip, chew, snus or snuff     Quit date: 1998     Years since quittin.6     Smokeless tobacco: Former User     Types: Chew     Tobacco comment: 1 tin per 10 days.   Substance  Use Topics     Alcohol use: No     Alcohol/week: 17.5 standard drinks     Types: 21 Cans of beer per week     Comment: last etoh 2/14/16, did have Odouls ~8/2017       Impression and Plan:    Good results post TIPS with reduction of ascites volume.  Patient will return at 3 months with new Labs and US.      Total time: 15 min

## 2022-05-03 NOTE — LETTER
"5/3/2022       RE: Ivan Bell  [id number] [facility] [address]     Dear Colleague,    Thank you for referring your patient, Ivan Bell, to the [facility] at [facility]. Please see a copy of my visit note below.    [facility]      Type of Visit: Virtual: Other: [phone]    Patient is here for a return visit to discuss F/U TIPS.     Vitals - Patient Reported  Weight (Patient Reported): 165 lb  Height (Patient Reported): 5' 10\"  BMI (Based on Pt Reported Ht/Wt): 23.67  Pain Score: No Pain (0)    Questions patient would like addressed today are: Patent's verbalized questions/concerns regarding sweating at night. . This has been communicated to the provider.     Refills are needed: No    How would you like to obtain your AVS? Frances Simmons       Telephone Visit: 5 min      INTERVENTIONAL RADIOLOGY CLINIC VISIT - FOLLOW UP     Name: Ivan Bell  Age: 56 year old   Referral: Dr. Bales         HPI: Ivan Bell is a 56 year old with history of cirrhosis 2/2 EtOH Who is coming for 1-month follow up post TIPS revision. He is s/p esophageal variceal banding, TIPS placement 6/2018 and multiple TIPS revisions d/t recurrent ascites. He was previously a patient of Dr. Elaine. S    Post TIPS revision with an extension of the stent, he had a few days of bad abdominal pain. However, he is doing much better now. His last Paracentesis was last Wednesday where he drained only 2.5 L, which is much better than before. He feels that he doesn't need any para at this point and he is scheduled to check in two weeks from now.  Appetite is good. His main complaint is increased night sweating.  I looked at his imaging and Labs.  The US (15 days ago) shows some improvement of the portal venous end velocities but still high velocity at the mid to proximal stent.  Labs: High Bili and low Albumin (4?15/22). No recent lab is " available.      Current Outpatient Medications   Medication     acetaminophen (TYLENOL) 500 MG tablet     Ascorbic Acid (VITAMIN C PO)     benzonatate (TESSALON) 100 MG capsule     eplerenone (INSPRA) 50 MG tablet     fexofenadine (ALLEGRA) 60 MG tablet     furosemide (LASIX) 20 MG tablet     furosemide (LASIX) 40 MG tablet     lactulose (CHRONULAC) 10 GM/15ML solution     Menaquinone-7 (VITAMIN K2) 100 MCG CAPS     MULTIPLE VITAMINS PO     ondansetron (ZOFRAN-ODT) 4 MG ODT tab     potassium chloride ER (KLOR-CON M) 20 MEQ CR tablet     rifaximin (XIFAXAN) 550 MG TABS tablet     sertraline (ZOLOFT) 50 MG tablet     sildenafil (REVATIO) 20 MG tablet     sodium bicarbonate 650 MG tablet     tamsulosin (FLOMAX) 0.4 MG capsule     vitamin D3 (CHOLECALCIFEROL) 2000 units (50 mcg) tablet     No current facility-administered medications for this visit.     Past Medical History:   Diagnosis Date     Alcoholic cirrhosis of liver (H)      Alcoholic hepatitis 03/2019     Chronic kidney disease     CRF     Depression      Gout      History of transfusion      Hypertension      Hypertension      Hypertriglyceridemia      Left calcaneus fracture 01/09/2006 January 16, 2006: Fell 10 feet from ladder onto left foot on frozen ground on 1/9/06 at home.  Immediate pain and unable to walk- seen at [place] and diagnosed with calcaneus fracture     Nephrolithiasis      Osteoarthritis      Portal vein thrombosis     left occlusion, partial main       Past Surgical History:   Procedure Laterality Date     ANKLE SURGERY Left      ANKLE SURGERY       ARTHROSCOPY KNEE       ARTHROSCOPY SHOULDER       ARTHROSCOPY SHOULDER ROTATOR CUFF REPAIR       COLONOSCOPY N/A 03/31/2016    Procedure: COLONOSCOPY;  Surgeon: Rhys Uriostegui MD;  Location:  GI     ESOPHAGOSCOPY, GASTROSCOPY, DUODENOSCOPY (EGD), COMBINED N/A 03/31/2016    Procedure: COMBINED ESOPHAGOSCOPY, GASTROSCOPY, DUODENOSCOPY (EGD);  Surgeon: Rhys Uriostegui  MD ALAN;  Location:  GI     ESOPHAGOSCOPY, GASTROSCOPY, DUODENOSCOPY (EGD), COMBINED N/A 03/09/2018    Procedure: COMBINED ESOPHAGOSCOPY, GASTROSCOPY, DUODENOSCOPY (EGD), BIOPSY SINGLE OR MULTIPLE;  EGD;  Surgeon: Gonzalo Wahl MD;  Location:  GI     ESOPHAGOSCOPY, GASTROSCOPY, DUODENOSCOPY (EGD), COMBINED N/A 06/07/2019    Procedure: ESOPHAGOGASTRODUODENOSCOPY (EGD);  Surgeon: Gonzalo Wahl MD;  Location:  GI     FOOT SURGERY       HIP SURGERY       IR PARACENTESIS  10/29/2019     IR PARACENTESIS  11/25/2020     IR PARACENTESIS  8/11/2021     IR PARACENTESIS  1/28/2022     IR PARACENTESIS  3/30/2022     IR TRANSVEN INTRAHEPATIC PORTOSYST REV  10/29/2019     IR TRANSVEN INTRAHEPATIC PORTOSYST REV  11/25/2020     IR TRANSVEN INTRAHEPATIC PORTOSYST REV  8/11/2021     IR TRANSVEN INTRAHEPATIC PORTOSYST REV  1/28/2022     IR TRANSVEN INTRAHEPATIC PORTOSYST REV  3/30/2022     KNEE SURGERY Left      KNEE SURGERY Right      RELEASE CARPAL TUNNEL       RELEASE TRIGGER FINGER Right 06/11/2020    Procedure: RELEASE, TRIGGER FINGER, right ring and long finger;  Surgeon: Pascual Valencia MD;  Location: [facility] OR     SIGMOIDOSCOPY FLEXIBLE N/A 10/31/2017    Procedure: SIGMOIDOSCOPY FLEXIBLE;;  Surgeon: Armaan Adams MD;  Location:  GI     TIPS Procedure  06/06/2018     TIPS PROCEDURE  11/01/2020     ZZC PLASTY KNEE,MED OR LAT COMPARTMT Right 02/19/2021    Procedure: RIGHT UNICOMPARTMENTAL ARTHROPLASTY KNEE MEDIAL;  Surgeon: José Miguel Landaverde MD;  Location: [facility] Main OR;  Service: Orthopedics       Family History   Problem Relation Age of Onset     Family History Negative Mother      Family History Negative Father      Hypertension Father      Cerebrovascular Disease Father 87     Breast Cancer Maternal Grandmother      Rheumatoid Arthritis Daughter      Depression Daughter      Substance Abuse Brother      Cancer - colorectal No family hx of      Prostate Cancer No family hx of      Liver  Disease No family hx of        Social History     Tobacco Use     Smoking status: Former Smoker     Packs/day: 0.00     Types: Dip, chew, snus or snuff     Quit date: 1998     Years since quittin.6     Smokeless tobacco: Former User     Types: Chew     Tobacco comment: 1 tin per 10 days.   Substance Use Topics     Alcohol use: No     Alcohol/week: 17.5 standard drinks     Types: 21 Cans of beer per week     Comment: last etoh 14, did have Odouls ~2017       Impression and Plan:  Good results post TIPS with reduction of ascites volume.  Patient will return at 3 months with new Labs and US.      Total time: 15 min      Sincerely,    Michelle Trinidad MD

## 2022-05-03 NOTE — NURSING NOTE
Chief Complaint   Patient presents with     Follow Up     1 month follow up TIPS       Patient confirms medications and allergies are accurate via patients echeck in completion, and or denies any changes since last reviewed/verified.     Nancy Simmons, Virtual Facilitator

## 2022-05-03 NOTE — TELEPHONE ENCOUNTER
Central Prior Authorization Team   Phone: [phone]      PA Initiation    Medication: Xifaxan 550mg-pa INITIATED  Insurance Company: [employer] ([facility]) - Phone [phone] Fax [phone]  Pharmacy Filling the Rx:    Filling Pharmacy Phone:    Filling Pharmacy Fax:    Start Date: 5/3/2022

## 2022-05-04 NOTE — TELEPHONE ENCOUNTER
Prior Authorization Approval    Authorization Effective Date: 5/3/2022  Authorization Expiration Date: 5/3/2023  Medication: Xifaxan 550mg-pa approved  Approved Dose/Quantity:   Reference #: [id number]   Insurance Company: [employer] ([facility]) - Phone [phone] Fax [phone]  Expected CoPay:       CoPay Card Available:      Foundation Assistance Needed:    Which Pharmacy is filling the prescription (Not needed for infusion/clinic administered): [facility] - [id number] [facility] [place]  Pharmacy Notified: Yes  Patient Notified: No

## 2022-05-09 ENCOUNTER — PATIENT OUTREACH (OUTPATIENT)
Dept: GASTROENTEROLOGY | Facility: CLINIC | Age: 57
End: 2022-05-09
Payer: COMMERCIAL

## 2022-05-09 NOTE — PROGRESS NOTES
Patient calling with complaints of increased urinary frequency, especially at night. He states he has been urinating up to every 20-30 minutes in small amounts. He denies burning, urgency, fever, abdominal pain. He does have occasional low back pain. Patient states he is taking his diuretics in the AM, and even if he decreases his fluid intake, the frequent urination keeps him up at night.

## 2022-05-10 ENCOUNTER — PATIENT OUTREACH (OUTPATIENT)
Dept: SURGERY | Facility: CLINIC | Age: 57
End: 2022-05-10
Payer: COMMERCIAL

## 2022-05-10 NOTE — PROGRESS NOTES
Called patient to schedule 3 month follow-up with Dr. Tripp to discuss whether or not he is a surgical candidate to repair his umbilical hernia. LVM to return phone call to get scheduled. Awaiting response from patient.

## 2022-05-11 NOTE — PROGRESS NOTES
Left message with patient of recommendations per Dr. Bales: cut back diuretics for a couple of days to see if that helps.  If not, see PCP.  Also discussed this with pt's spouse Elicia, who will make sure pt gets this message.  Will touch base with patient next week to see how things are going.

## 2022-05-12 DIAGNOSIS — E87.6 HYPOKALEMIA: ICD-10-CM

## 2022-05-12 RX ORDER — POTASSIUM CHLORIDE 1500 MG/1
40 TABLET, EXTENDED RELEASE ORAL DAILY
Qty: 120 TABLET | Refills: 1 | Status: SHIPPED | OUTPATIENT
Start: 2022-05-12 | End: 2022-09-07

## 2022-05-12 NOTE — TELEPHONE ENCOUNTER
Hello,   The patient is requesting a refill on Potassium Chloride ER 20meq tablet. Thanks!    [facility]  Phone: [phone]  Fax: [phone]

## 2022-05-17 ENCOUNTER — PATIENT OUTREACH (OUTPATIENT)
Dept: GASTROENTEROLOGY | Facility: CLINIC | Age: 57
End: 2022-05-17
Payer: COMMERCIAL

## 2022-05-17 NOTE — PROGRESS NOTES
Patient called to check in. He did decrease diuretics for a few days over the weekend, but really didn't notice improvement in urinary frequency. Per Dr. Bales, if no improvement, patient should discuss with his PCP. Suggested he could also try checking in with his kidney doctors to see if the kidney stones could be causing this issue. Patient verbalized understanding, and has no further concerns at this time.

## 2022-06-03 ENCOUNTER — PATIENT OUTREACH (OUTPATIENT)
Dept: GASTROENTEROLOGY | Facility: CLINIC | Age: 57
End: 2022-06-03
Payer: COMMERCIAL

## 2022-06-03 NOTE — PROGRESS NOTES
Patient's spouse Elicia called to provide her update and concerns, as she will not be at the appointment with Dr. Bales on 6/13.   -She states patient has cut back on diuretics as noted in medication list, and is doing well in terms of no fluid retention noted.   -Patient has stopped taking lactulose altogether. Although she does not see any confusion or forgetfulness, she is concerned by the increase in sleep along with napping throughout the day, lack of motivation, and an increase in depressed mood.   -She assisted patient in getting all the paperwork completed in order to begin CD treatment, but he never did get started with the counseling.   Overall, she is concerned about his depressed mood, and is not sure what to do about this, she has tried encouraging him but he is not motivated.

## 2022-06-07 ENCOUNTER — OFFICE VISIT (OUTPATIENT)
Dept: FAMILY MEDICINE | Facility: CLINIC | Age: 57
End: 2022-06-07
Payer: COMMERCIAL

## 2022-06-07 ENCOUNTER — PATIENT OUTREACH (OUTPATIENT)
Dept: GASTROENTEROLOGY | Facility: CLINIC | Age: 57
End: 2022-06-07

## 2022-06-07 ENCOUNTER — DOCUMENTATION ONLY (OUTPATIENT)
Dept: LAB | Facility: CLINIC | Age: 57
End: 2022-06-07

## 2022-06-07 VITALS
BODY MASS INDEX: 25.51 KG/M2 | DIASTOLIC BLOOD PRESSURE: 68 MMHG | SYSTOLIC BLOOD PRESSURE: 136 MMHG | HEART RATE: 76 BPM | TEMPERATURE: 97.3 F | OXYGEN SATURATION: 99 % | WEIGHT: 177.8 LBS

## 2022-06-07 DIAGNOSIS — Z71.89 ADVANCE CARE PLANNING: ICD-10-CM

## 2022-06-07 DIAGNOSIS — Z01.818 PRE-OPERATIVE GENERAL PHYSICAL EXAMINATION: Primary | ICD-10-CM

## 2022-06-07 DIAGNOSIS — Z13.220 SCREENING FOR HYPERLIPIDEMIA: ICD-10-CM

## 2022-06-07 DIAGNOSIS — I10 HYPERTENSION GOAL BP (BLOOD PRESSURE) < 140/90: ICD-10-CM

## 2022-06-07 DIAGNOSIS — K70.31 ALCOHOLIC CIRRHOSIS OF LIVER WITH ASCITES (H): Primary | ICD-10-CM

## 2022-06-07 DIAGNOSIS — N18.2 BENIGN HYPERTENSION WITH CKD (CHRONIC KIDNEY DISEASE), STAGE II: ICD-10-CM

## 2022-06-07 DIAGNOSIS — N18.2 CKD (CHRONIC KIDNEY DISEASE) STAGE 2, GFR 60-89 ML/MIN: ICD-10-CM

## 2022-06-07 DIAGNOSIS — K70.31 ALCOHOLIC CIRRHOSIS OF LIVER WITH ASCITES (H): ICD-10-CM

## 2022-06-07 DIAGNOSIS — I12.9 BENIGN HYPERTENSION WITH CKD (CHRONIC KIDNEY DISEASE), STAGE II: ICD-10-CM

## 2022-06-07 DIAGNOSIS — D69.6 THROMBOCYTOPENIA (H): ICD-10-CM

## 2022-06-07 DIAGNOSIS — N52.8 OTHER MALE ERECTILE DYSFUNCTION: ICD-10-CM

## 2022-06-07 DIAGNOSIS — Z11.4 SCREENING FOR HIV (HUMAN IMMUNODEFICIENCY VIRUS): ICD-10-CM

## 2022-06-07 DIAGNOSIS — N20.0 NEPHROLITHIASIS: ICD-10-CM

## 2022-06-07 LAB
ALBUMIN SERPL-MCNC: 2.1 G/DL (ref 3.4–5)
ALP SERPL-CCNC: 150 U/L (ref 40–150)
ALT SERPL W P-5'-P-CCNC: 23 U/L (ref 0–70)
ANION GAP SERPL CALCULATED.3IONS-SCNC: 3 MMOL/L (ref 3–14)
AST SERPL W P-5'-P-CCNC: 34 U/L (ref 0–45)
BILIRUB SERPL-MCNC: 1.6 MG/DL (ref 0.2–1.3)
BUN SERPL-MCNC: 13 MG/DL (ref 7–30)
CALCIUM SERPL-MCNC: 8.5 MG/DL (ref 8.5–10.1)
CHLORIDE BLD-SCNC: 118 MMOL/L (ref 94–109)
CO2 SERPL-SCNC: 23 MMOL/L (ref 20–32)
CREAT SERPL-MCNC: 1.21 MG/DL (ref 0.66–1.25)
ERYTHROCYTE [DISTWIDTH] IN BLOOD BY AUTOMATED COUNT: 17.2 % (ref 10–15)
GFR SERPL CREATININE-BSD FRML MDRD: 69 ML/MIN/1.73M2
GLUCOSE BLD-MCNC: 70 MG/DL (ref 70–99)
HCT VFR BLD AUTO: 31.6 % (ref 40–53)
HGB BLD-MCNC: 10 G/DL (ref 13.3–17.7)
HIV 1+2 AB+HIV1 P24 AG SERPL QL IA: NONREACTIVE
HOLD SPECIMEN: NORMAL
MCH RBC QN AUTO: 30.7 PG (ref 26.5–33)
MCHC RBC AUTO-ENTMCNC: 31.6 G/DL (ref 31.5–36.5)
MCV RBC AUTO: 97 FL (ref 78–100)
PLATELET # BLD AUTO: 78 10E3/UL (ref 150–450)
POTASSIUM BLD-SCNC: 3.8 MMOL/L (ref 3.4–5.3)
PROT SERPL-MCNC: 5.3 G/DL (ref 6.8–8.8)
RBC # BLD AUTO: 3.26 10E6/UL (ref 4.4–5.9)
SODIUM SERPL-SCNC: 144 MMOL/L (ref 133–144)
WBC # BLD AUTO: 3.9 10E3/UL (ref 4–11)

## 2022-06-07 PROCEDURE — 36415 COLL VENOUS BLD VENIPUNCTURE: CPT | Performed by: STUDENT IN AN ORGANIZED HEALTH CARE EDUCATION/TRAINING PROGRAM

## 2022-06-07 PROCEDURE — 85027 COMPLETE CBC AUTOMATED: CPT | Performed by: STUDENT IN AN ORGANIZED HEALTH CARE EDUCATION/TRAINING PROGRAM

## 2022-06-07 PROCEDURE — 80053 COMPREHEN METABOLIC PANEL: CPT | Performed by: STUDENT IN AN ORGANIZED HEALTH CARE EDUCATION/TRAINING PROGRAM

## 2022-06-07 PROCEDURE — 87389 HIV-1 AG W/HIV-1&-2 AB AG IA: CPT | Performed by: STUDENT IN AN ORGANIZED HEALTH CARE EDUCATION/TRAINING PROGRAM

## 2022-06-07 PROCEDURE — 99214 OFFICE O/P EST MOD 30 MIN: CPT | Performed by: STUDENT IN AN ORGANIZED HEALTH CARE EDUCATION/TRAINING PROGRAM

## 2022-06-07 PROCEDURE — 82043 UR ALBUMIN QUANTITATIVE: CPT | Performed by: STUDENT IN AN ORGANIZED HEALTH CARE EDUCATION/TRAINING PROGRAM

## 2022-06-07 RX ORDER — SILDENAFIL CITRATE 20 MG/1
TABLET ORAL
Qty: 30 TABLET | Refills: 1 | Status: SHIPPED | OUTPATIENT
Start: 2022-06-07

## 2022-06-07 ASSESSMENT — PAIN SCALES - GENERAL: PAINLEVEL: NO PAIN (0)

## 2022-06-07 NOTE — PATIENT INSTRUCTIONS
Damian Love,    Thank you for allowing [facility] to manage your care.    I ordered some blood work, please go to the laboratory to get your laboratory studies.        I sent your prescriptions to your pharmacy.    You can take all your medications with a sip of water the morning of the surgery if your surgery is later in the day    You can take all your medications except for your Furosamide and eplerenone if your surgery is in the morning. Take the Furosamide and eplerenone after the surgery    For your convenience, test results are released as soon as they are available  Please allow 1-2 business days for me to send you a comment about your results.  If not done so, I encourage you to login into [email or web address] [email or web address] to review your results in real time.     If you have any questions or concerns, please feel free to call us at [phone].    Sincerely,    Dr. Acuña    Did you know?      You can schedule a video visit for follow-up appointments as well as future appointments for certain conditions.  Please see the below link.     [email or web address]    If you have not already done so,  I encourage you to sign up for [employer] ([email or web address]).  This will allow you to review your results, securely communicate with a provider, and schedule virtual visits as well.

## 2022-06-07 NOTE — PROGRESS NOTES
[facility]  [address] [place] [address]  Phone: [phone]  Primary Provider: Too Washington  Pre-op Performing Provider: FLOR ROSE  PREOPERATIVE EVALUATION:  Today's date: 6/7/2022    Ivan Bell is a 56 year old male who presents for a preoperative evaluation.    Surgical Information:  Surgery/Procedure: CYSTOSCOPY, RIGHT RETROGRADE PYELOGRAM, RIGHT URETEROSCOPY WITH LASER LITHOTRIPSY AND BASKET REMOVA OF STONE, RIGHT URETERAL STENT PLACEMENT, LEFT RETROGRADE PYELOGRAM, LEFT URETEROSCOPY WITH LASER LITHOTRIPSY AND BASKET REMOVAL OF STONE, LEFT URETERAL STENT PLACEMENT  Surgery Location: [place]  Surgeon: Dr. Dylan Galaviz  Surgery Date: June 14, 2022 and June 28, 2022  Time of Surgery: TBD  Where patient plans to recover: At home with family  Fax number for surgical facility: Note does not need to be faxed, will be available electronically in Epic.    Type of Anesthesia Anticipated: to be determined    Assessment & Plan     The proposed surgical procedure is considered INTERMEDIATE risk.    1. Pre-operative general physical examination    - Albumin Random Urine Quantitative with Creat Ratio; Future  - Albumin Random Urine Quantitative with Creat Ratio  CMP and CBC reviewed patient has anemia secondary to chronic disease and liver disease.  Patient h  2. Nephrolithiasis      3. Advance care planning    Form given  4. Screening for hyperlipidemia      5. Thrombocytopenia (H)    Stable,he can have FFP if needed  6. Hypertension goal BP (blood pressure) < 140/90  Controlled  - Albumin Random Urine Quantitative with Creat Ratio; Future  - Albumin Random Urine Quantitative with Creat Ratio    7. CKD (chronic kidney disease) stage 2, GFR 60-89 ml/min    - Albumin Random Urine Quantitative with Creat Ratio; Future  - Albumin Random Urine Quantitative with Creat Ratio    8. Alcoholic cirrhosis of liver with ascites (H)  Status post TIPS, managed by  vascular and GI  - Comprehensive metabolic panel  - CBC with platelets    9. Other male erectile dysfunction  Refill sildenafil    10. Screening for HIV (human immunodeficiency virus)    - HIV Antigen Antibody Combo; Future  - HIV Antigen Antibody Combo           Risks and Recommendations:  The patient has the following additional risks and recommendations for perioperative complications:  Anemia/Bleeding/Clotting:    - Anemia and does not require treatment prior to surgery. Monitor hemoglobin postoperatively  -Thrombocytopenia, stable.  He can have FFP if needed  Medication Instructions:  Okay to take medications with a sip of water the morning of surgery    RECOMMENDATION:  APPROVAL GIVEN to proceed with proposed procedure   [id number]}    Subjective     HPI related to upcoming procedure: patient with recurrent kidney stones.      Preop Questions 6/7/2022   1. Have you ever had a heart attack or stroke? No   2. Have you ever had surgery on your heart or blood vessels, such as a stent placement, a coronary artery bypass, or surgery on an artery in your head, neck, heart, or legs? YES - liver   3. Do you have chest pain with activity? No   4. Do you have a history of  heart failure? No   5. Do you currently have a cold, bronchitis or symptoms of other infection? No   6. Do you have a cough, shortness of breath, or wheezing? No   7. Do you or anyone in your family have previous history of blood clots? No   8. Do you or does anyone in your family have a serious bleeding problem such as prolonged bleeding following surgeries or cuts? No   9. Have you ever had problems with anemia or been told to take iron pills? No   10. Have you had any abnormal blood loss such as black, tarry or bloody stools? No   11. Have you ever had a blood transfusion? Yes   11a. Have you ever had a transfusion reaction? No   12. Are you willing to have a blood transfusion if it is medically needed before, during, or after your surgery? Yes   13.  Have you or any of your relatives ever had problems with anesthesia? No   14. Do you have sleep apnea, excessive snoring or daytime drowsiness? No   15. Do you have any artifical heart valves or other implanted medical devices like a pacemaker, defibrillator, or continuous glucose monitor? No   16. Do you have artificial joints? Knee and foot   17. Are you allergic to latex? No     Health Care Directive:  Patient does not have a Health Care Directive or Living Will: Discussed advance care planning with patient; information given to patient to review.     Preoperative Review of :   reviewed - controlled substances prescribed by other outside provider(s).  [id number]}    Status of Chronic Conditions:  See problem list for active medical problems.  Problems all longstanding and stable, except as noted/documented.  See ROS for pertinent symptoms related to these conditions.    Patient has a history of cirrhosis secondary to alcohol status post TIPS.  Patient has thrombocytopenia secondary to liver cirrhosis.  Patient has erectile dysfunction for which he takes Sildenafil. He Is requesting for medication refill today.  Patient has hypertension. Review of Systems  Constitutional, neuro, ENT, endocrine, pulmonary, cardiac, gastrointestinal, genitourinary, musculoskeletal, integument and psychiatric systems are negative, except as otherwise noted.    Patient Active Problem List    Diagnosis Date Noted     Cervicalgia 04/13/2021     Priority: Medium     Acute low back pain 04/13/2021     Priority: Medium     Thrombocytopenia (H) 06/05/2020     Priority: Medium     CKD (chronic kidney disease) stage 2, GFR 60-89 ml/min 04/17/2020     Priority: Medium     Seasonal allergic rhinitis, unspecified trigger 04/17/2020     Priority: Medium     Nephrolithiasis 09/23/2018     Priority: Medium     September 23, 2018 non-obstructing, incident finding on us.        Alcoholic cirrhosis of liver with ascites (H) 03/08/2016     Priority:  Medium     September 23, 2018 now sober, ascites resolved, S/P TIP procedure 6/2018. Normal liver enzymes, diminished liver function.  INR 1.6, bilirubin 2.5, albumin 2.6. He  appears healthy. Doing well. Stressed the importance of abstaining from alcohol. See copy of recent us.     9/10/18:  Impression:   1. Patent TIPS with appropriate in stent velocities. Normal Doppler  evaluation of the remainder of the hepatic vasculature in the setting  of TIPS.  2. Cirrhotic hepatic morphology with evidence of portal hypertension,  including splenomegaly. No focal hepatic mass.  3. Cholelithiasis without evidence of cholecystitis.  4. Bilateral nonobstructing nephrolithiasis.            Hypertension goal BP (blood pressure) < 140/90 12/18/2012     Priority: Medium     24 hour contact given to patient  01/02/2012     Priority: Medium     EMERGENCY CARE PLAN  Presenting Problem Signs and Symptoms Treatment Plan    Questions or conerns during clinic hours    I will call the clinic directly     Questions or conerns outside clinic hours    I will call the 24 hour nurse line at [phone]    Patient needs to schedule an appointment    I will call the 24 hour scheduling team at [phone] or clinic directly    Same day treatment     I will call the clinic first, nurse line if after hours, urgent care and express care if needed                                 CARDIOVASCULAR SCREENING; LDL GOAL LESS THAN 130 10/31/2010     Priority: Medium     Erectile dysfunction 01/29/2008     Priority: Medium     September 23, 2018 no known CAD, no contraindications to sildenafil.        Gouty arthropathy 12/31/2006     Priority: Medium     Problem list name updated by automated process. Provider to review and confirm  [employer] Update utility       Hypertriglyceridemia 08/03/2005     Priority: Medium     Last Exam: December 19, 2007  Last Lipids: CHOL      211   01/25/2007 LDL      104   01/25/2007 HDL       83   01/25/2007  Last LFTs:: AST       106   01/25/2007   ALT       53   01/25/2007    TRIG      120   01/25/2007  TRIG      115   01/16/2006  Meds: Lopid 600 bid - tolerating        Past Medical History:   Diagnosis Date     Alcoholic cirrhosis of liver (H)      Alcoholic hepatitis 03/2019     Chronic kidney disease     CRF     Depression      Gout      History of transfusion      Hypertension      Hypertension      Hypertriglyceridemia      Left calcaneus fracture 01/09/2006 January 16, 2006: Fell 10 feet from ladder onto left foot on frozen ground on 1/9/06 at home.  Immediate pain and unable to walk- seen at [place] and diagnosed with calcaneus fracture     Nephrolithiasis      Osteoarthritis      Portal vein thrombosis     left occlusion, partial main     Past Surgical History:   Procedure Laterality Date     ANKLE SURGERY Left      ANKLE SURGERY       ARTHROSCOPY KNEE       ARTHROSCOPY SHOULDER       ARTHROSCOPY SHOULDER ROTATOR CUFF REPAIR       COLONOSCOPY N/A 03/31/2016    Procedure: COLONOSCOPY;  Surgeon: Rhys Uriostegui MD;  Location:  GI     ESOPHAGOSCOPY, GASTROSCOPY, DUODENOSCOPY (EGD), COMBINED N/A 03/31/2016    Procedure: COMBINED ESOPHAGOSCOPY, GASTROSCOPY, DUODENOSCOPY (EGD);  Surgeon: Rhys Uriostegui MD;  Location:  GI     ESOPHAGOSCOPY, GASTROSCOPY, DUODENOSCOPY (EGD), COMBINED N/A 03/09/2018    Procedure: COMBINED ESOPHAGOSCOPY, GASTROSCOPY, DUODENOSCOPY (EGD), BIOPSY SINGLE OR MULTIPLE;  EGD;  Surgeon: Gonzalo Wahl MD;  Location:  GI     ESOPHAGOSCOPY, GASTROSCOPY, DUODENOSCOPY (EGD), COMBINED N/A 06/07/2019    Procedure: ESOPHAGOGASTRODUODENOSCOPY (EGD);  Surgeon: Gonzalo Wahl MD;  Location:  GI     FOOT SURGERY       HIP SURGERY       IR PARACENTESIS  10/29/2019     IR PARACENTESIS  11/25/2020     IR PARACENTESIS  8/11/2021     IR PARACENTESIS  1/28/2022     IR PARACENTESIS  3/30/2022     IR TRANSVEN INTRAHEPATIC PORTOSYST REV  10/29/2019     IR TRANSVEN INTRAHEPATIC  PORTOSYST REV  11/25/2020     IR TRANSVEN INTRAHEPATIC PORTOSYST REV  8/11/2021     IR TRANSVEN INTRAHEPATIC PORTOSYST REV  1/28/2022     IR TRANSVEN INTRAHEPATIC PORTOSYST REV  3/30/2022     KNEE SURGERY Left      KNEE SURGERY Right      RELEASE CARPAL TUNNEL       RELEASE TRIGGER FINGER Right 06/11/2020    Procedure: RELEASE, TRIGGER FINGER, right ring and long finger;  Surgeon: Pascual Valencia MD;  Location: [facility] OR     SIGMOIDOSCOPY FLEXIBLE N/A 10/31/2017    Procedure: SIGMOIDOSCOPY FLEXIBLE;;  Surgeon: Armaan Adams MD;  Location: [facility] GI     TIPS Procedure  06/06/2018     TIPS PROCEDURE  11/01/2020     ZZC PLASTY KNEE,MED OR LAT COMPARTMT Right 02/19/2021    Procedure: RIGHT UNICOMPARTMENTAL ARTHROPLASTY KNEE MEDIAL;  Surgeon: José Miguel Landaverde MD;  Location: [facility];  Service: Orthopedics     Current Outpatient Medications   Medication Sig Dispense Refill     acetaminophen (TYLENOL) 500 MG tablet Take 1,000 mg by mouth every 6 hours as needed for mild pain        Ascorbic Acid (VITAMIN C PO) Take by mouth daily       benzonatate (TESSALON) 100 MG capsule Take 1 capsule (100 mg) by mouth 3 times daily as needed for cough 15 capsule 0     eplerenone (INSPRA) 50 MG tablet Take 2 tablets (100 mg) by mouth 2 times daily (Patient taking differently: Take 100 mg by mouth 2 times daily Patient taking 100 mg AM and 50 mg PM.) 340 tablet 3     fexofenadine (ALLEGRA) 60 MG tablet Take 1 tablet (60 mg) by mouth daily 30 tablet 1     furosemide (LASIX) 40 MG tablet Take 1 tablet (40 mg) by mouth every morning 90 tablet 3     lactulose (CHRONULAC) 10 GM/15ML solution TAKE 30MLS BY MOUTH THREE TIMES DAILY AS NEEDED FOR CONSTIPATION (TAKE AS NEEDED TO MAINTAIN 3 TO 4 BOWEL MOVEMENTS DAILY) 2000 mL 11     Menaquinone-7 (VITAMIN K2) 100 MCG CAPS Take 200 mcg by mouth daily        MULTIPLE VITAMINS PO Take 1 tablet by mouth daily       ondansetron (ZOFRAN-ODT) 4 MG ODT tab Take 1 tablet (4 mg) by mouth every 8  hours as needed for nausea 30 tablet 0     potassium chloride ER (KLOR-CON M) 20 MEQ CR tablet Take 2 tablets (40 mEq) by mouth daily 120 tablet 1     rifaximin (XIFAXAN) 550 MG TABS tablet Take 1 tablet (550 mg) by mouth 2 times daily 180 tablet 3     sertraline (ZOLOFT) 50 MG tablet TAKE ONE-HALF TABLET BY MOUTH EVERY DAY 45 tablet 3     sildenafil (REVATIO) 20 MG tablet Take 3-5 tabs 1/2-4 hours to relations 30 tablet 11     sodium bicarbonate 650 MG tablet Take 1 tablet (650 mg) by mouth 2 times daily 180 tablet 3     tamsulosin (FLOMAX) 0.4 MG capsule Take 1 capsule (0.4 mg) by mouth daily 7 capsule 1     vitamin D3 (CHOLECALCIFEROL) 2000 units (50 mcg) tablet Take 1 tablet by mouth daily       furosemide (LASIX) 20 MG tablet Take 1 tablet (20 mg) by mouth every evening (Patient not taking: Reported on 2022) 90 tablet 3       Allergies   Allergen Reactions     Prednisone Visual Disturbance     Trazodone Visual Disturbance     Benadryl [Diphenhydramine] Other (See Comments)     Delirium (visual and auditory hallucinations)        Social History     Tobacco Use     Smoking status: Former Smoker     Packs/day: 0.00     Types: Dip, chew, snus or snuff     Quit date: 1998     Years since quittin.7     Smokeless tobacco: Former User     Types: Chew     Tobacco comment: 1 tin per 10 days.   Substance Use Topics     Alcohol use: No     Alcohol/week: 17.5 standard drinks     Types: 21 Cans of beer per week     Comment: last etoh 14, did have Odouls ~2017     Family History   Problem Relation Age of Onset     Family History Negative Mother      Family History Negative Father      Hypertension Father      Cerebrovascular Disease Father 87     Breast Cancer Maternal Grandmother      Rheumatoid Arthritis Daughter      Depression Daughter      Substance Abuse Brother      Cancer - colorectal No family hx of      Prostate Cancer No family hx of      Liver Disease No family hx of      History   Drug Use No          Objective     BP (!) 150/67   Pulse 76   Temp 97.3  F (36.3  C) (Tympanic)   Wt 80.6 kg (177 lb 12.8 oz)   SpO2 99%   BMI 25.51 kg/m      Physical Exam    GENERAL APPEARANCE: healthy, alert and no distress     EYES: EOMI,  PERRL     HENT: ear canals and TM's normal and nose and mouth without ulcers or lesions     NECK: no adenopathy, no asymmetry, masses, or scars and thyroid normal to palpation     RESP: lungs clear to auscultation - no rales, rhonchi or wheezes     CV: regular rates and rhythm, normal S1 S2, no S3 or S4 and no murmur, click or rub     ABDOMEN:  soft, nontender, no HSM or masses and bowel sounds normal     MS: extremities normal- no gross deformities noted, no evidence of inflammation in joints, FROM in all extremities.     SKIN: no suspicious lesions or rashes     NEURO: Normal strength and tone, sensory exam grossly normal, mentation intact and speech normal     PSYCH: mentation appears normal. and affect normal/bright     LYMPHATICS: No cervical adenopathy    Recent Labs   Lab Test 04/15/22  0728 03/30/22  1325 03/07/22  0746   HGB 10.0* 10.8* 11.2*   PLT 62* 69* 67*   INR 1.87* 1.76* 1.81*   *  --  144   POTASSIUM 3.8  --  3.3*   CR 1.19  --  1.20        Diagnostics:  Labs pending at this time.  Results will be reviewed when available.     No EKG required, no history of coronary heart disease, significant arrhythmia, peripheral arterial disease or other structural heart disease.  EKG from 3/21: appears normal, NSR, Normal Sinus Rhythm    Revised Cardiac Risk Index (RCRI):  The patient has the following serious cardiovascular risks for perioperative complications:   - No serious cardiac risks = 0 points     RCRI Interpretation: 0 points: Class I (very low risk - 0.4% complication rate)          Signed Electronically by: Abena Acuña MD  Copy of this evaluation report is provided to requesting physician.

## 2022-06-07 NOTE — PROGRESS NOTES
Returned call to patient to review upcoming appointments. There was no answer so message left with brief review of next scheduled appointments and requested call back if further questions.

## 2022-06-07 NOTE — PROGRESS NOTES
Patient was in today for his labs but has no orders from you. If needed please place orders. We have some extra tubes.    Thank you,  [facility]    Addendum: orders placed per Dr. Bales as add on labs.

## 2022-06-08 LAB
CREAT UR-MCNC: 104 MG/DL
MICROALBUMIN UR-MCNC: 273 MG/L
MICROALBUMIN/CREAT UR: 262.5 MG/G CR (ref 0–17)

## 2022-06-09 ENCOUNTER — PREP FOR PROCEDURE (OUTPATIENT)
Dept: UROLOGY | Facility: CLINIC | Age: 57
End: 2022-06-09
Payer: COMMERCIAL

## 2022-06-09 ENCOUNTER — TELEPHONE (OUTPATIENT)
Dept: UROLOGY | Facility: CLINIC | Age: 57
End: 2022-06-09
Payer: COMMERCIAL

## 2022-06-09 DIAGNOSIS — R79.1 ELEVATED INR: ICD-10-CM

## 2022-06-09 DIAGNOSIS — K70.31 ALCOHOLIC CIRRHOSIS OF LIVER WITH ASCITES (H): ICD-10-CM

## 2022-06-09 DIAGNOSIS — N20.0 LEFT RENAL STONE: ICD-10-CM

## 2022-06-09 DIAGNOSIS — N20.0 RIGHT KIDNEY STONE: Primary | ICD-10-CM

## 2022-06-09 NOTE — TELEPHONE ENCOUNTER
According to OR staff, patient cannot have surgery here in [facility] due to being a transplant patient and having a large medical hx. Patient will need to be done at the hospital.    Sent message to schedulers at the hospital to reschedule both 6/14 and 6/28 procedures.     Pre-op nurse called patient and left a voicemail for the patient.

## 2022-06-10 ENCOUNTER — TELEPHONE (OUTPATIENT)
Dept: UROLOGY | Facility: CLINIC | Age: 57
End: 2022-06-10
Payer: COMMERCIAL

## 2022-06-10 ENCOUNTER — PATIENT OUTREACH (OUTPATIENT)
Dept: GASTROENTEROLOGY | Facility: CLINIC | Age: 57
End: 2022-06-10
Payer: COMMERCIAL

## 2022-06-10 NOTE — TELEPHONE ENCOUNTER
[facility] Call Center    Phone Message    May a detailed message be left on voicemail: yes     Reason for Call: Other: Ivan is calling stating that Dr. Bales cleared him to be able to have his surgeries on 6/14 and 6/28. He is stating he wants them put back on the schedule. Please call him back to discuss, thanks!      Action Taken: Message routed to:  Adult Clinics: Urology p [id number]    Travel Screening: Not Applicable

## 2022-06-10 NOTE — PROGRESS NOTES
"Patient called to discuss upcoming procedure appointments. Via review of chart, it was noted that all appointments in regards to the surgical procedures for kidney stones have been cancelled as of yesterday afternoon noting \"due to being a transplant patient and having a large medical hx.\" Patient has no history of any transplant and was unaware of this change. Notes say he was left a message regarding this but he was not aware of one. Writer called and left message for Dr. Galaviz and his office, and asked that they call patient directly to discuss. Patient will keep his phone near him today and watch for a call. Patient is aware of and planning for appointment 6/13 for labs and office visit with Dr. Bales.   "

## 2022-06-10 NOTE — TELEPHONE ENCOUNTER
[facility] Call Center    Phone Message    May a detailed message be left on voicemail: yes     Reason for Call: Per Mary, Nurse at [facility] and patient, he was scheduled for cysto with Dr. Galaviz. Patient received message about being a transplant patient. Per Mary, patient is not a transplant patient.  He is disappointed that his surgery got canceled.  Please reach out to patient.    Action Taken: Message routed to:  [facility] Urology    Travel Screening: Not Applicable

## 2022-06-10 NOTE — TELEPHONE ENCOUNTER
Left generic voicemail for patient to return call to clinic. When call is returned will  Relay the note below.    Per pre-op nurse and surgery scheduler, patients medical history is too complex to be done at a surgery center, and he needs to have his surgeries done in a hospital setting. A message has already been sent to the [facility] surgery schedulers.     Holli Sarmiento LPN on 6/10/2022 at 9:04 AM

## 2022-06-12 PROCEDURE — 99358 PROLONG SERVICE W/O CONTACT: CPT | Performed by: INTERNAL MEDICINE

## 2022-06-13 ENCOUNTER — OFFICE VISIT (OUTPATIENT)
Dept: GASTROENTEROLOGY | Facility: CLINIC | Age: 57
End: 2022-06-13
Attending: INTERNAL MEDICINE
Payer: COMMERCIAL

## 2022-06-13 ENCOUNTER — LAB (OUTPATIENT)
Dept: LAB | Facility: CLINIC | Age: 57
End: 2022-06-13
Payer: COMMERCIAL

## 2022-06-13 ENCOUNTER — PATIENT OUTREACH (OUTPATIENT)
Dept: GASTROENTEROLOGY | Facility: CLINIC | Age: 57
End: 2022-06-13

## 2022-06-13 VITALS
HEIGHT: 70 IN | DIASTOLIC BLOOD PRESSURE: 79 MMHG | BODY MASS INDEX: 23.79 KG/M2 | RESPIRATION RATE: 16 BRPM | HEART RATE: 71 BPM | WEIGHT: 166.2 LBS | OXYGEN SATURATION: 100 % | TEMPERATURE: 98 F | SYSTOLIC BLOOD PRESSURE: 147 MMHG

## 2022-06-13 DIAGNOSIS — K70.31 ALCOHOLIC CIRRHOSIS OF LIVER WITH ASCITES (H): Primary | ICD-10-CM

## 2022-06-13 DIAGNOSIS — K70.31 ALCOHOLIC CIRRHOSIS OF LIVER WITH ASCITES (H): ICD-10-CM

## 2022-06-13 LAB
ALBUMIN SERPL-MCNC: 2.4 G/DL (ref 3.4–5)
ALP SERPL-CCNC: 178 U/L (ref 40–150)
ALT SERPL W P-5'-P-CCNC: 26 U/L (ref 0–70)
ANION GAP SERPL CALCULATED.3IONS-SCNC: 7 MMOL/L (ref 3–14)
AST SERPL W P-5'-P-CCNC: 37 U/L (ref 0–45)
BILIRUB DIRECT SERPL-MCNC: 0.9 MG/DL (ref 0–0.2)
BILIRUB SERPL-MCNC: 2.1 MG/DL (ref 0.2–1.3)
BUN SERPL-MCNC: 14 MG/DL (ref 7–30)
CALCIUM SERPL-MCNC: 9.2 MG/DL (ref 8.5–10.1)
CHLORIDE BLD-SCNC: 110 MMOL/L (ref 94–109)
CO2 SERPL-SCNC: 29 MMOL/L (ref 20–32)
CREAT SERPL-MCNC: 1.34 MG/DL (ref 0.66–1.25)
ERYTHROCYTE [DISTWIDTH] IN BLOOD BY AUTOMATED COUNT: 16.8 % (ref 10–15)
GFR SERPL CREATININE-BSD FRML MDRD: 61 ML/MIN/1.73M2
GLUCOSE BLD-MCNC: 91 MG/DL (ref 70–99)
HCT VFR BLD AUTO: 38.9 % (ref 40–53)
HGB BLD-MCNC: 12.6 G/DL (ref 13.3–17.7)
INR PPP: 1.65 (ref 0.85–1.15)
MCH RBC QN AUTO: 30.4 PG (ref 26.5–33)
MCHC RBC AUTO-ENTMCNC: 32.4 G/DL (ref 31.5–36.5)
MCV RBC AUTO: 94 FL (ref 78–100)
PLATELET # BLD AUTO: 83 10E3/UL (ref 150–450)
POTASSIUM BLD-SCNC: 4.2 MMOL/L (ref 3.4–5.3)
PROT SERPL-MCNC: 6 G/DL (ref 6.8–8.8)
RBC # BLD AUTO: 4.14 10E6/UL (ref 4.4–5.9)
SODIUM SERPL-SCNC: 146 MMOL/L (ref 133–144)
WBC # BLD AUTO: 4.3 10E3/UL (ref 4–11)

## 2022-06-13 PROCEDURE — 82248 BILIRUBIN DIRECT: CPT | Performed by: STUDENT IN AN ORGANIZED HEALTH CARE EDUCATION/TRAINING PROGRAM

## 2022-06-13 PROCEDURE — 85027 COMPLETE CBC AUTOMATED: CPT | Performed by: PATHOLOGY

## 2022-06-13 PROCEDURE — 99215 OFFICE O/P EST HI 40 MIN: CPT | Mod: GC | Performed by: INTERNAL MEDICINE

## 2022-06-13 PROCEDURE — 36415 COLL VENOUS BLD VENIPUNCTURE: CPT | Performed by: PATHOLOGY

## 2022-06-13 PROCEDURE — G0463 HOSPITAL OUTPT CLINIC VISIT: HCPCS

## 2022-06-13 PROCEDURE — 85610 PROTHROMBIN TIME: CPT | Performed by: PATHOLOGY

## 2022-06-13 PROCEDURE — 80053 COMPREHEN METABOLIC PANEL: CPT | Performed by: PATHOLOGY

## 2022-06-13 ASSESSMENT — PAIN SCALES - GENERAL: PAINLEVEL: NO PAIN (0)

## 2022-06-13 NOTE — LETTER
6/13/2022         RE: Ivan Bell  [address] [facility] [address]      Physician Attestation  I, Gonzalo Bales, saw this patient with the fellow and agree with the fellow s findings and plan of care as documented in the fellow s note.  I personally reviewed vital signs, medications, labs and imaging.    Ascites appears to have resolved since TIPS revision.  Umbilical hernia remains symptomatic.  MELD-Na= 18 (stable).  Last ETOH= Feb 2019.  Due for chemical dependency evaluation on 6/15.  LT evaluation team have contacted the patient but he has yet to return their calls- he remains interested in LT and will need to complete his evaluation prior to any consideration of umbilical hernia repair surgery.      Gonzalo Bales  Date of Service (when I saw the patient): Jun 13, 2022    I spent 40 minutes on the date of the encounter doing chart review, history and exam, documentation and further activities as noted above.    On 6/12/2022, approximately 35 minutes of non face-to-face time were spent in review of the patient's medical record to date.  This included review of previous: clinic visits, hospital records, lab results, imaging studies, and procedural documentation.  The findings from this review are summarized in the above note.  _________________________________________________________________________________    Date of Service: 6/13/2022     Subjective:            Ivan Bell is a 56 year old male presenting for follow-up of liver disease    - Etiology: ETOH  - hx variceal bleed s/p TIPS  - hx ascites s/p TIPS  - hx HE minimal  - last EGD 2019  - HCC screening CT abd Feb 2022    History of Present Illness   Ivan Bell is a 56 year old male with past medical history of alcoholic cirrhosis complicated by ascites and history of esophageal varices s/p TIPS with TIPS malfunction resulting in recurrent ascites s/p balloon plasty of TIPS stent on 3/30/2022 who is coming in for follow-up.    Patient  stated that he does not has any bleeding, no melena/hematochezia or hematemesis since the TIPS was placed.  He had issues with ascites and was requiring paracentesis but since March 2022 after the TIPS revision, he has not required any paracentesis.  He denies any lower extremity swelling.  He does not know whether he is taking Lasix or not since his wife is managing his medications.    He has hepatic encephalopathy which is very minimal and is under control.  He has been having 2-3 bowel movements a day.  He takes lactulose as needed with approximately 3 doses in a week.  Does not remember if he is taking rifaximin or not.    He will have his first chemical dependency evaluation on 6/15/2022.    He was scheduled for a procedure for nephrolithiasis but that got canceled.    Last alcohol intake was 4 years back, lives with his wife.    Past Medical History:  Past Medical History:   Diagnosis Date     Alcoholic cirrhosis of liver (H)      Alcoholic hepatitis 03/2019     Chronic kidney disease     CRF     Depression      Gout      History of transfusion      Hypertension      Hypertension      Hypertriglyceridemia      Left calcaneus fracture 01/09/2006 January 16, 2006: Fell 10 feet from ladder onto left foot on frozen ground on 1/9/06 at home.  Immediate pain and unable to walk- seen at [place] and diagnosed with calcaneus fracture     Nephrolithiasis      Osteoarthritis      Portal vein thrombosis     left occlusion, partial main       Surgical History:  Past Surgical History:   Procedure Laterality Date     ANKLE SURGERY Left      ANKLE SURGERY       ARTHROSCOPY KNEE       ARTHROSCOPY SHOULDER       ARTHROSCOPY SHOULDER ROTATOR CUFF REPAIR       COLONOSCOPY N/A 03/31/2016    Procedure: COLONOSCOPY;  Surgeon: Rhys Uriostegui MD;  Location:  GI     ESOPHAGOSCOPY, GASTROSCOPY, DUODENOSCOPY (EGD), COMBINED N/A 03/31/2016    Procedure: COMBINED ESOPHAGOSCOPY, GASTROSCOPY, DUODENOSCOPY (EGD);  Surgeon:  Rhys Uriostegui MD;  Location:  GI     ESOPHAGOSCOPY, GASTROSCOPY, DUODENOSCOPY (EGD), COMBINED N/A 2018    Procedure: COMBINED ESOPHAGOSCOPY, GASTROSCOPY, DUODENOSCOPY (EGD), BIOPSY SINGLE OR MULTIPLE;  EGD;  Surgeon: Gonzalo Wahl MD;  Location:  GI     ESOPHAGOSCOPY, GASTROSCOPY, DUODENOSCOPY (EGD), COMBINED N/A 2019    Procedure: ESOPHAGOGASTRODUODENOSCOPY (EGD);  Surgeon: Gonzalo Wahl MD;  Location:  GI     FOOT SURGERY       HIP SURGERY       IR PARACENTESIS  10/29/2019     IR PARACENTESIS  2020     IR PARACENTESIS  2021     IR PARACENTESIS  2022     IR PARACENTESIS  3/30/2022     IR TRANSVEN INTRAHEPATIC PORTOSYST REV  10/29/2019     IR TRANSVEN INTRAHEPATIC PORTOSYST REV  2020     IR TRANSVEN INTRAHEPATIC PORTOSYST REV  2021     IR TRANSVEN INTRAHEPATIC PORTOSYST REV  2022     IR TRANSVEN INTRAHEPATIC PORTOSYST REV  3/30/2022     KNEE SURGERY Left      KNEE SURGERY Right      RELEASE CARPAL TUNNEL       RELEASE TRIGGER FINGER Right 2020    Procedure: RELEASE, TRIGGER FINGER, right ring and long finger;  Surgeon: Pascual Valencia MD;  Location: [facility] OR     SIGMOIDOSCOPY FLEXIBLE N/A 10/31/2017    Procedure: SIGMOIDOSCOPY FLEXIBLE;;  Surgeon: Armaan Adams MD;  Location:  GI     TIPS Procedure  2018     TIPS PROCEDURE  2020     ZZC PLASTY KNEE,MED OR LAT COMPARTMT Right 2021    Procedure: RIGHT UNICOMPARTMENTAL ARTHROPLASTY KNEE MEDIAL;  Surgeon: José Miguel Landaverde MD;  Location: [facility] OR;  Service: Orthopedics       Social History:  Social History     Tobacco Use     Smoking status: Former Smoker     Packs/day: 0.00     Types: Dip, chew, snus or snuff     Quit date: 1998     Years since quittin.8     Smokeless tobacco: Former User     Types: Chew     Tobacco comment: 1 tin per 10 days.   Vaping Use     Vaping Use: Never used   Substance Use Topics     Alcohol use: No      "Alcohol/week: 17.5 standard drinks     Types: 21 Cans of beer per week     Comment: last etoh 2/14/16, did have Odouls ~8/2017     Drug use: No       Family History:  Family History   Problem Relation Age of Onset     Family History Negative Mother      Family History Negative Father      Hypertension Father      Cerebrovascular Disease Father 87     Breast Cancer Maternal Grandmother      Rheumatoid Arthritis Daughter      Depression Daughter      Substance Abuse Brother      Cancer - colorectal No family hx of      Prostate Cancer No family hx of      Liver Disease No family hx of        Medications:  Current Outpatient Medications   Medication     acetaminophen (TYLENOL) 500 MG tablet     Ascorbic Acid (VITAMIN C PO)     eplerenone (INSPRA) 50 MG tablet     fexofenadine (ALLEGRA) 60 MG tablet     furosemide (LASIX) 40 MG tablet     lactulose (CHRONULAC) 10 GM/15ML solution     Menaquinone-7 (VITAMIN K2) 100 MCG CAPS     MULTIPLE VITAMINS PO     ondansetron (ZOFRAN-ODT) 4 MG ODT tab     potassium chloride ER (KLOR-CON M) 20 MEQ CR tablet     rifaximin (XIFAXAN) 550 MG TABS tablet     sertraline (ZOLOFT) 50 MG tablet     sildenafil (REVATIO) 20 MG tablet     sodium bicarbonate 650 MG tablet     vitamin D3 (CHOLECALCIFEROL) 2000 units (50 mcg) tablet     benzonatate (TESSALON) 100 MG capsule     furosemide (LASIX) 20 MG tablet     tamsulosin (FLOMAX) 0.4 MG capsule     No current facility-administered medications for this visit.       Review of Systems  A complete 10 point review of systems was asked and answered in the negative unless specifically commented upon in the HPI    Objective:         Vitals:    06/13/22 1128   BP: (!) 147/79   BP Location: Right arm   Patient Position: Sitting   Cuff Size: Adult Regular   Pulse: 71   Resp: 16   Temp: 98  F (36.7  C)   TempSrc: Oral   SpO2: 100%   Weight: 75.4 kg (166 lb 3.2 oz)   Height: 1.778 m (5' 10\")     Body mass index is 23.85 kg/m .     Physical " Exam  Constitutional: Well-developed, well-nourished, in no apparent distress.    HEENT: Normocephalic. No scleral icterus.   Neck/Lymph: Normal ROM, supple.   Cardiac:  Regular rate and rhythm.  No overt murmurs  Respiratory: Clear to auscultation bilaterally.  No wheezes or rales  GI:  Abdomen soft, non-distended, non-tender. BS present. No shifting dullness. No overt hepatosplenomegaly.  Umbilical hernia  Skin:  Skin is warm and dry. No rash noted.  No jaundice.   Peripheral Vascular: No lower extremity edema. 2+ pulses in all extremities  Musculoskeletal:  ROM intact.  Psychiatric: Normal mood and affect. Behavior is normal.  Neuro:  No asterixis.    Labs and Diagnostic tests:  Lab Results   Component Value Date     06/13/2022     06/23/2021    POTASSIUM 4.2 06/13/2022    POTASSIUM 4.0 06/23/2021    CHLORIDE 110 06/13/2022    CHLORIDE 117 06/23/2021    CO2 29 06/13/2022    CO2 25 06/23/2021    BUN 14 06/13/2022    BUN 8 06/23/2021    CR 1.34 06/13/2022    CR 1.09 06/23/2021     Lab Results   Component Value Date    BILITOTAL 2.1 06/13/2022    BILITOTAL 3.6 06/23/2021    ALT 26 06/13/2022    ALT 19 06/23/2021    AST 37 06/13/2022    AST 33 06/23/2021    ALKPHOS 178 06/13/2022    ALKPHOS 165 06/23/2021     Lab Results   Component Value Date    ALBUMIN 2.4 06/13/2022    ALBUMIN 2.7 06/23/2021    PROTTOTAL 6.0 06/13/2022    PROTTOTAL 5.6 06/23/2021      Lab Results   Component Value Date    WBC 4.3 06/13/2022    WBC 4.3 06/23/2021    HGB 12.6 06/13/2022    HGB 12.1 06/23/2021    MCV 94 06/13/2022    MCV 99 06/23/2021    PLT 83 06/13/2022    PLT 58 06/23/2021     Lab Results   Component Value Date    INR 1.65 06/13/2022    INR 1.79 06/23/2021       MELD-Na score: 18 at 6/13/2022 10:42 AM  MELD score: 18 at 6/13/2022 10:42 AM  Calculated from:  Serum Creatinine: 1.34 mg/dL at 6/13/2022 10:42 AM  Serum Sodium: 146 mmol/L (Using max of 137 mmol/L) at 6/13/2022 10:42 AM  Total Bilirubin: 2.1 mg/dL at  6/13/2022 10:42 AM  INR(ratio): 1.65 at 6/13/2022 10:42 AM  Age: 56 years    Imaging:  IR TIPS 3/30/22 reviewed  US TIPS Doppler 4/15/22 reviewed    Assessment and Plan:  56 year old male with past medical history of alcoholic cirrhosis complicated by ascites and history of esophageal varices s/p TIPS with TIPS malfunction resulting in recurrent ascites s/p balloon plasty of TIPS stent on 3/30/2022 who is coming in for follow-up.    Alcoholic Cirrhosis MELD 18:    Patient has alcoholic cirrhosis with a meld of 18, he is s/p TIPS for his refractory esophageal varices but developed recurrent ascites prompting TIPS revision in March 2022.  He has not required any paracentesis since then but his TIPS velocities remain elevated.  He would require a liver transplantation but needs a CD evaluation before that. He is also planning for umbilical hernia repair.    Liver Transplant Candidacy:   - chem dep evaluation as scheduled  - start LT evaluation    Ascites-s/p TIPS:  -He has been on Lasix   - Continue to follow a sodium restricted (<2g sodium diet)     Hepatic Encephalopathy:  - On lactulose and Rifaximin, recommend compliance to prevent worsening HE    Follow Up:  -- in 3 mo with Dr. Bales    Pt was seen and discussed with Dr. Bales.    Ayaka Barnett M.D.  Advanced & Transplant Hepatology Fellow  [facility]

## 2022-06-13 NOTE — NURSING NOTE
"Chief Complaint   Patient presents with     RECHECK     Cirrhosis     Vital signs:  Temp: 98  F (36.7  C) Temp src: Oral BP: (!) 147/79 Pulse: 71   Resp: 16 SpO2: 100 %     Height: 177.8 cm (5' 10\") Weight: 75.4 kg (166 lb 3.2 oz)  Estimated body mass index is 23.85 kg/m  as calculated from the following:    Height as of this encounter: 1.778 m (5' 10\").    Weight as of this encounter: 75.4 kg (166 lb 3.2 oz).      Lida Hutchinson, [facility]  6/13/2022 11:30 AM      "

## 2022-06-13 NOTE — PROGRESS NOTES
Physician Attestation  I, Gonzalo Bales, saw this patient with the fellow and agree with the fellow s findings and plan of care as documented in the fellow s note.  I personally reviewed vital signs, medications, labs and imaging.    Ascites appears to have resolved since TIPS revision.  Umbilical hernia remains symptomatic.  MELD-Na= 18 (stable).  Last ETOH= Feb 2019.  Due for chemical dependency evaluation on 6/15.  LT evaluation team have contacted the patient but he has yet to return their calls- he remains interested in LT and will need to complete his evaluation prior to any consideration of umbilical hernia repair surgery.      Gonzalo Bales  Date of Service (when I saw the patient): Jun 13, 2022    I spent 40 minutes on the date of the encounter doing chart review, history and exam, documentation and further activities as noted above.    On 6/12/2022, approximately 35 minutes of non face-to-face time were spent in review of the patient's medical record to date.  This included review of previous: clinic visits, hospital records, lab results, imaging studies, and procedural documentation.  The findings from this review are summarized in the above note.  _________________________________________________________________________________    Date of Service: 6/13/2022     Subjective:            Ivan Bell is a 56 year old male presenting for follow-up of liver disease    - Etiology: ETOH  - hx variceal bleed s/p TIPS  - hx ascites s/p TIPS  - hx HE minimal  - last EGD 2019  - HCC screening CT abd Feb 2022    History of Present Illness   Ivan Bell is a 56 year old male with past medical history of alcoholic cirrhosis complicated by ascites and history of esophageal varices s/p TIPS with TIPS malfunction resulting in recurrent ascites s/p balloon plasty of TIPS stent on 3/30/2022 who is coming in for follow-up.    Patient stated that he does not has any bleeding, no melena/hematochezia or hematemesis since  the TIPS was placed.  He had issues with ascites and was requiring paracentesis but since March 2022 after the TIPS revision, he has not required any paracentesis.  He denies any lower extremity swelling.  He does not know whether he is taking Lasix or not since his wife is managing his medications.    He has hepatic encephalopathy which is very minimal and is under control.  He has been having 2-3 bowel movements a day.  He takes lactulose as needed with approximately 3 doses in a week.  Does not remember if he is taking rifaximin or not.    He will have his first chemical dependency evaluation on 6/15/2022.    He was scheduled for a procedure for nephrolithiasis but that got canceled.    Last alcohol intake was 4 years back, lives with his wife.    Past Medical History:  Past Medical History:   Diagnosis Date     Alcoholic cirrhosis of liver (H)      Alcoholic hepatitis 03/2019     Chronic kidney disease     CRF     Depression      Gout      History of transfusion      Hypertension      Hypertension      Hypertriglyceridemia      Left calcaneus fracture 01/09/2006 January 16, 2006: Fell 10 feet from ladder onto left foot on frozen ground on 1/9/06 at home.  Immediate pain and unable to walk- seen at [place] and diagnosed with calcaneus fracture     Nephrolithiasis      Osteoarthritis      Portal vein thrombosis     left occlusion, partial main       Surgical History:  Past Surgical History:   Procedure Laterality Date     ANKLE SURGERY Left      ANKLE SURGERY       ARTHROSCOPY KNEE       ARTHROSCOPY SHOULDER       ARTHROSCOPY SHOULDER ROTATOR CUFF REPAIR       COLONOSCOPY N/A 03/31/2016    Procedure: COLONOSCOPY;  Surgeon: Rhys Uriostegui MD;  Location:  GI     ESOPHAGOSCOPY, GASTROSCOPY, DUODENOSCOPY (EGD), COMBINED N/A 03/31/2016    Procedure: COMBINED ESOPHAGOSCOPY, GASTROSCOPY, DUODENOSCOPY (EGD);  Surgeon: Rhys Uriostegui MD;  Location:  GI     ESOPHAGOSCOPY, GASTROSCOPY,  DUODENOSCOPY (EGD), COMBINED N/A 2018    Procedure: COMBINED ESOPHAGOSCOPY, GASTROSCOPY, DUODENOSCOPY (EGD), BIOPSY SINGLE OR MULTIPLE;  EGD;  Surgeon: Gonzalo Wahl MD;  Location:  GI     ESOPHAGOSCOPY, GASTROSCOPY, DUODENOSCOPY (EGD), COMBINED N/A 2019    Procedure: ESOPHAGOGASTRODUODENOSCOPY (EGD);  Surgeon: Gonzalo Wahl MD;  Location:  GI     FOOT SURGERY       HIP SURGERY       IR PARACENTESIS  10/29/2019     IR PARACENTESIS  2020     IR PARACENTESIS  2021     IR PARACENTESIS  2022     IR PARACENTESIS  3/30/2022     IR TRANSVEN INTRAHEPATIC PORTOSYST REV  10/29/2019     IR TRANSVEN INTRAHEPATIC PORTOSYST REV  2020     IR TRANSVEN INTRAHEPATIC PORTOSYST REV  2021     IR TRANSVEN INTRAHEPATIC PORTOSYST REV  2022     IR TRANSVEN INTRAHEPATIC PORTOSYST REV  3/30/2022     KNEE SURGERY Left      KNEE SURGERY Right      RELEASE CARPAL TUNNEL       RELEASE TRIGGER FINGER Right 2020    Procedure: RELEASE, TRIGGER FINGER, right ring and long finger;  Surgeon: Pascual Valencia MD;  Location: [facility] OR     SIGMOIDOSCOPY FLEXIBLE N/A 10/31/2017    Procedure: SIGMOIDOSCOPY FLEXIBLE;;  Surgeon: Armaan Adams MD;  Location:  GI     TIPS Procedure  2018     TIPS PROCEDURE  2020     ZZC PLASTY KNEE,MED OR LAT COMPARTMT Right 2021    Procedure: RIGHT UNICOMPARTMENTAL ARTHROPLASTY KNEE MEDIAL;  Surgeon: José Miguel Landaverde MD;  Location: [facility];  Service: Orthopedics       Social History:  Social History     Tobacco Use     Smoking status: Former Smoker     Packs/day: 0.00     Types: Dip, chew, snus or snuff     Quit date: 1998     Years since quittin.8     Smokeless tobacco: Former User     Types: Chew     Tobacco comment: 1 tin per 10 days.   Vaping Use     Vaping Use: Never used   Substance Use Topics     Alcohol use: No     Alcohol/week: 17.5 standard drinks     Types: 21 Cans of beer per week     Comment: last  "etoh 2/14/16, did have Odouls ~8/2017     Drug use: No       Family History:  Family History   Problem Relation Age of Onset     Family History Negative Mother      Family History Negative Father      Hypertension Father      Cerebrovascular Disease Father 87     Breast Cancer Maternal Grandmother      Rheumatoid Arthritis Daughter      Depression Daughter      Substance Abuse Brother      Cancer - colorectal No family hx of      Prostate Cancer No family hx of      Liver Disease No family hx of        Medications:  Current Outpatient Medications   Medication     acetaminophen (TYLENOL) 500 MG tablet     Ascorbic Acid (VITAMIN C PO)     eplerenone (INSPRA) 50 MG tablet     fexofenadine (ALLEGRA) 60 MG tablet     furosemide (LASIX) 40 MG tablet     lactulose (CHRONULAC) 10 GM/15ML solution     Menaquinone-7 (VITAMIN K2) 100 MCG CAPS     MULTIPLE VITAMINS PO     ondansetron (ZOFRAN-ODT) 4 MG ODT tab     potassium chloride ER (KLOR-CON M) 20 MEQ CR tablet     rifaximin (XIFAXAN) 550 MG TABS tablet     sertraline (ZOLOFT) 50 MG tablet     sildenafil (REVATIO) 20 MG tablet     sodium bicarbonate 650 MG tablet     vitamin D3 (CHOLECALCIFEROL) 2000 units (50 mcg) tablet     benzonatate (TESSALON) 100 MG capsule     furosemide (LASIX) 20 MG tablet     tamsulosin (FLOMAX) 0.4 MG capsule     No current facility-administered medications for this visit.       Review of Systems  A complete 10 point review of systems was asked and answered in the negative unless specifically commented upon in the HPI    Objective:         Vitals:    06/13/22 1128   BP: (!) 147/79   BP Location: Right arm   Patient Position: Sitting   Cuff Size: Adult Regular   Pulse: 71   Resp: 16   Temp: 98  F (36.7  C)   TempSrc: Oral   SpO2: 100%   Weight: 75.4 kg (166 lb 3.2 oz)   Height: 1.778 m (5' 10\")     Body mass index is 23.85 kg/m .     Physical Exam  Constitutional: Well-developed, well-nourished, in no apparent distress.    HEENT: Normocephalic. No " scleral icterus.   Neck/Lymph: Normal ROM, supple.   Cardiac:  Regular rate and rhythm.  No overt murmurs  Respiratory: Clear to auscultation bilaterally.  No wheezes or rales  GI:  Abdomen soft, non-distended, non-tender. BS present. No shifting dullness. No overt hepatosplenomegaly.  Umbilical hernia  Skin:  Skin is warm and dry. No rash noted.  No jaundice.   Peripheral Vascular: No lower extremity edema. 2+ pulses in all extremities  Musculoskeletal:  ROM intact.  Psychiatric: Normal mood and affect. Behavior is normal.  Neuro:  No asterixis.    Labs and Diagnostic tests:  Lab Results   Component Value Date     06/13/2022     06/23/2021    POTASSIUM 4.2 06/13/2022    POTASSIUM 4.0 06/23/2021    CHLORIDE 110 06/13/2022    CHLORIDE 117 06/23/2021    CO2 29 06/13/2022    CO2 25 06/23/2021    BUN 14 06/13/2022    BUN 8 06/23/2021    CR 1.34 06/13/2022    CR 1.09 06/23/2021     Lab Results   Component Value Date    BILITOTAL 2.1 06/13/2022    BILITOTAL 3.6 06/23/2021    ALT 26 06/13/2022    ALT 19 06/23/2021    AST 37 06/13/2022    AST 33 06/23/2021    ALKPHOS 178 06/13/2022    ALKPHOS 165 06/23/2021     Lab Results   Component Value Date    ALBUMIN 2.4 06/13/2022    ALBUMIN 2.7 06/23/2021    PROTTOTAL 6.0 06/13/2022    PROTTOTAL 5.6 06/23/2021      Lab Results   Component Value Date    WBC 4.3 06/13/2022    WBC 4.3 06/23/2021    HGB 12.6 06/13/2022    HGB 12.1 06/23/2021    MCV 94 06/13/2022    MCV 99 06/23/2021    PLT 83 06/13/2022    PLT 58 06/23/2021     Lab Results   Component Value Date    INR 1.65 06/13/2022    INR 1.79 06/23/2021       MELD-Na score: 18 at 6/13/2022 10:42 AM  MELD score: 18 at 6/13/2022 10:42 AM  Calculated from:  Serum Creatinine: 1.34 mg/dL at 6/13/2022 10:42 AM  Serum Sodium: 146 mmol/L (Using max of 137 mmol/L) at 6/13/2022 10:42 AM  Total Bilirubin: 2.1 mg/dL at 6/13/2022 10:42 AM  INR(ratio): 1.65 at 6/13/2022 10:42 AM  Age: 56 years    Imaging:  IR TIPS 3/30/22 reviewed  US  TIPS Doppler 4/15/22 reviewed    Assessment and Plan:  56 year old male with past medical history of alcoholic cirrhosis complicated by ascites and history of esophageal varices s/p TIPS with TIPS malfunction resulting in recurrent ascites s/p balloon plasty of TIPS stent on 3/30/2022 who is coming in for follow-up.    Alcoholic Cirrhosis MELD 18:    Patient has alcoholic cirrhosis with a meld of 18, he is s/p TIPS for his refractory esophageal varices but developed recurrent ascites prompting TIPS revision in March 2022.  He has not required any paracentesis since then but his TIPS velocities remain elevated.  He would require a liver transplantation but needs a CD evaluation before that. He is also planning for umbilical hernia repair.    Liver Transplant Candidacy:   - chem dep evaluation as scheduled  - start LT evaluation    Ascites-s/p TIPS:  -He has been on Lasix   - Continue to follow a sodium restricted (<2g sodium diet)     Hepatic Encephalopathy:  - On lactulose and Rifaximin, recommend compliance to prevent worsening HE    Follow Up:  -- in 3 mo with Dr. Bales    Pt was seen and discussed with Dr. Bales.    Ayaka Barnett M.D.  Advanced & Transplant Hepatology Fellow  [facility]

## 2022-06-13 NOTE — H&P (VIEW-ONLY)
Physician Attestation  I, Gonzalo Bales, saw this patient with the fellow and agree with the fellow s findings and plan of care as documented in the fellow s note.  I personally reviewed vital signs, medications, labs and imaging.    Ascites appears to have resolved since TIPS revision.  Umbilical hernia remains symptomatic.  MELD-Na= 18 (stable).  Last ETOH= Feb 2019.  Due for chemical dependency evaluation on 6/15.  LT evaluation team have contacted the patient but he has yet to return their calls- he remains interested in LT and will need to complete his evaluation prior to any consideration of umbilical hernia repair surgery.      Gonzalo Bales  Date of Service (when I saw the patient): Jun 13, 2022    I spent 40 minutes on the date of the encounter doing chart review, history and exam, documentation and further activities as noted above.    On 6/12/2022, approximately 35 minutes of non face-to-face time were spent in review of the patient's medical record to date.  This included review of previous: clinic visits, hospital records, lab results, imaging studies, and procedural documentation.  The findings from this review are summarized in the above note.  _________________________________________________________________________________    Date of Service: 6/13/2022     Subjective:            Ivan Bell is a 56 year old male presenting for follow-up of liver disease    - Etiology: ETOH  - hx variceal bleed s/p TIPS  - hx ascites s/p TIPS  - hx HE minimal  - last EGD 2019  - HCC screening CT abd Feb 2022    History of Present Illness   Ivan Bell is a 56 year old male with past medical history of alcoholic cirrhosis complicated by ascites and history of esophageal varices s/p TIPS with TIPS malfunction resulting in recurrent ascites s/p balloon plasty of TIPS stent on 3/30/2022 who is coming in for follow-up.    Patient stated that he does not has any bleeding, no melena/hematochezia or hematemesis since  the TIPS was placed.  He had issues with ascites and was requiring paracentesis but since March 2022 after the TIPS revision, he has not required any paracentesis.  He denies any lower extremity swelling.  He does not know whether he is taking Lasix or not since his wife is managing his medications.    He has hepatic encephalopathy which is very minimal and is under control.  He has been having 2-3 bowel movements a day.  He takes lactulose as needed with approximately 3 doses in a week.  Does not remember if he is taking rifaximin or not.    He will have his first chemical dependency evaluation on 6/15/2022.    He was scheduled for a procedure for nephrolithiasis but that got canceled.    Last alcohol intake was 4 years back, lives with his wife.    Past Medical History:  Past Medical History:   Diagnosis Date     Alcoholic cirrhosis of liver (H)      Alcoholic hepatitis 03/2019     Chronic kidney disease     CRF     Depression      Gout      History of transfusion      Hypertension      Hypertension      Hypertriglyceridemia      Left calcaneus fracture 01/09/2006 January 16, 2006: Fell 10 feet from ladder onto left foot on frozen ground on 1/9/06 at home.  Immediate pain and unable to walk- seen at [place] and diagnosed with calcaneus fracture     Nephrolithiasis      Osteoarthritis      Portal vein thrombosis     left occlusion, partial main       Surgical History:  Past Surgical History:   Procedure Laterality Date     ANKLE SURGERY Left      ANKLE SURGERY       ARTHROSCOPY KNEE       ARTHROSCOPY SHOULDER       ARTHROSCOPY SHOULDER ROTATOR CUFF REPAIR       COLONOSCOPY N/A 03/31/2016    Procedure: COLONOSCOPY;  Surgeon: Rhys Uriostegui MD;  Location:  GI     ESOPHAGOSCOPY, GASTROSCOPY, DUODENOSCOPY (EGD), COMBINED N/A 03/31/2016    Procedure: COMBINED ESOPHAGOSCOPY, GASTROSCOPY, DUODENOSCOPY (EGD);  Surgeon: Rhys Uriostegui MD;  Location:  GI     ESOPHAGOSCOPY, GASTROSCOPY,  DUODENOSCOPY (EGD), COMBINED N/A 2018    Procedure: COMBINED ESOPHAGOSCOPY, GASTROSCOPY, DUODENOSCOPY (EGD), BIOPSY SINGLE OR MULTIPLE;  EGD;  Surgeon: Gonzalo Wahl MD;  Location:  GI     ESOPHAGOSCOPY, GASTROSCOPY, DUODENOSCOPY (EGD), COMBINED N/A 2019    Procedure: ESOPHAGOGASTRODUODENOSCOPY (EGD);  Surgeon: Gonzalo Wahl MD;  Location:  GI     FOOT SURGERY       HIP SURGERY       IR PARACENTESIS  10/29/2019     IR PARACENTESIS  2020     IR PARACENTESIS  2021     IR PARACENTESIS  2022     IR PARACENTESIS  3/30/2022     IR TRANSVEN INTRAHEPATIC PORTOSYST REV  10/29/2019     IR TRANSVEN INTRAHEPATIC PORTOSYST REV  2020     IR TRANSVEN INTRAHEPATIC PORTOSYST REV  2021     IR TRANSVEN INTRAHEPATIC PORTOSYST REV  2022     IR TRANSVEN INTRAHEPATIC PORTOSYST REV  3/30/2022     KNEE SURGERY Left      KNEE SURGERY Right      RELEASE CARPAL TUNNEL       RELEASE TRIGGER FINGER Right 2020    Procedure: RELEASE, TRIGGER FINGER, right ring and long finger;  Surgeon: Pascual Valencia MD;  Location: [facility]     SIGMOIDOSCOPY FLEXIBLE N/A 10/31/2017    Procedure: SIGMOIDOSCOPY FLEXIBLE;;  Surgeon: Armaan Adams MD;  Location:  GI     TIPS Procedure  2018     TIPS PROCEDURE  2020     ZZC PLASTY KNEE,MED OR LAT COMPARTMT Right 2021    Procedure: RIGHT UNICOMPARTMENTAL ARTHROPLASTY KNEE MEDIAL;  Surgeon: José Miguel Landaverde MD;  Location: [facility];  Service: Orthopedics       Social History:  Social History     Tobacco Use     Smoking status: Former Smoker     Packs/day: 0.00     Types: Dip, chew, snus or snuff     Quit date: 1998     Years since quittin.8     Smokeless tobacco: Former User     Types: Chew     Tobacco comment: 1 tin per 10 days.   Vaping Use     Vaping Use: Never used   Substance Use Topics     Alcohol use: No     Alcohol/week: 17.5 standard drinks     Types: 21 Cans of beer per week     Comment: last  "etoh 2/14/16, did have Odouls ~8/2017     Drug use: No       Family History:  Family History   Problem Relation Age of Onset     Family History Negative Mother      Family History Negative Father      Hypertension Father      Cerebrovascular Disease Father 87     Breast Cancer Maternal Grandmother      Rheumatoid Arthritis Daughter      Depression Daughter      Substance Abuse Brother      Cancer - colorectal No family hx of      Prostate Cancer No family hx of      Liver Disease No family hx of        Medications:  Current Outpatient Medications   Medication     acetaminophen (TYLENOL) 500 MG tablet     Ascorbic Acid (VITAMIN C PO)     eplerenone (INSPRA) 50 MG tablet     fexofenadine (ALLEGRA) 60 MG tablet     furosemide (LASIX) 40 MG tablet     lactulose (CHRONULAC) 10 GM/15ML solution     Menaquinone-7 (VITAMIN K2) 100 MCG CAPS     MULTIPLE VITAMINS PO     ondansetron (ZOFRAN-ODT) 4 MG ODT tab     potassium chloride ER (KLOR-CON M) 20 MEQ CR tablet     rifaximin (XIFAXAN) 550 MG TABS tablet     sertraline (ZOLOFT) 50 MG tablet     sildenafil (REVATIO) 20 MG tablet     sodium bicarbonate 650 MG tablet     vitamin D3 (CHOLECALCIFEROL) 2000 units (50 mcg) tablet     benzonatate (TESSALON) 100 MG capsule     furosemide (LASIX) 20 MG tablet     tamsulosin (FLOMAX) 0.4 MG capsule     No current facility-administered medications for this visit.       Review of Systems  A complete 10 point review of systems was asked and answered in the negative unless specifically commented upon in the HPI    Objective:         Vitals:    06/13/22 1128   BP: (!) 147/79   BP Location: Right arm   Patient Position: Sitting   Cuff Size: Adult Regular   Pulse: 71   Resp: 16   Temp: 98  F (36.7  C)   TempSrc: Oral   SpO2: 100%   Weight: 75.4 kg (166 lb 3.2 oz)   Height: 1.778 m (5' 10\")     Body mass index is 23.85 kg/m .     Physical Exam  Constitutional: Well-developed, well-nourished, in no apparent distress.    HEENT: Normocephalic. No " scleral icterus.   Neck/Lymph: Normal ROM, supple.   Cardiac:  Regular rate and rhythm.  No overt murmurs  Respiratory: Clear to auscultation bilaterally.  No wheezes or rales  GI:  Abdomen soft, non-distended, non-tender. BS present. No shifting dullness. No overt hepatosplenomegaly.  Umbilical hernia  Skin:  Skin is warm and dry. No rash noted.  No jaundice.   Peripheral Vascular: No lower extremity edema. 2+ pulses in all extremities  Musculoskeletal:  ROM intact.  Psychiatric: Normal mood and affect. Behavior is normal.  Neuro:  No asterixis.    Labs and Diagnostic tests:  Lab Results   Component Value Date     06/13/2022     06/23/2021    POTASSIUM 4.2 06/13/2022    POTASSIUM 4.0 06/23/2021    CHLORIDE 110 06/13/2022    CHLORIDE 117 06/23/2021    CO2 29 06/13/2022    CO2 25 06/23/2021    BUN 14 06/13/2022    BUN 8 06/23/2021    CR 1.34 06/13/2022    CR 1.09 06/23/2021     Lab Results   Component Value Date    BILITOTAL 2.1 06/13/2022    BILITOTAL 3.6 06/23/2021    ALT 26 06/13/2022    ALT 19 06/23/2021    AST 37 06/13/2022    AST 33 06/23/2021    ALKPHOS 178 06/13/2022    ALKPHOS 165 06/23/2021     Lab Results   Component Value Date    ALBUMIN 2.4 06/13/2022    ALBUMIN 2.7 06/23/2021    PROTTOTAL 6.0 06/13/2022    PROTTOTAL 5.6 06/23/2021      Lab Results   Component Value Date    WBC 4.3 06/13/2022    WBC 4.3 06/23/2021    HGB 12.6 06/13/2022    HGB 12.1 06/23/2021    MCV 94 06/13/2022    MCV 99 06/23/2021    PLT 83 06/13/2022    PLT 58 06/23/2021     Lab Results   Component Value Date    INR 1.65 06/13/2022    INR 1.79 06/23/2021       MELD-Na score: 18 at 6/13/2022 10:42 AM  MELD score: 18 at 6/13/2022 10:42 AM  Calculated from:  Serum Creatinine: 1.34 mg/dL at 6/13/2022 10:42 AM  Serum Sodium: 146 mmol/L (Using max of 137 mmol/L) at 6/13/2022 10:42 AM  Total Bilirubin: 2.1 mg/dL at 6/13/2022 10:42 AM  INR(ratio): 1.65 at 6/13/2022 10:42 AM  Age: 56 years    Imaging:  IR TIPS 3/30/22 reviewed  US  TIPS Doppler 4/15/22 reviewed    Assessment and Plan:  56 year old male with past medical history of alcoholic cirrhosis complicated by ascites and history of esophageal varices s/p TIPS with TIPS malfunction resulting in recurrent ascites s/p balloon plasty of TIPS stent on 3/30/2022 who is coming in for follow-up.    Alcoholic Cirrhosis MELD 18:    Patient has alcoholic cirrhosis with a meld of 18, he is s/p TIPS for his refractory esophageal varices but developed recurrent ascites prompting TIPS revision in March 2022.  He has not required any paracentesis since then but his TIPS velocities remain elevated.  He would require a liver transplantation but needs a CD evaluation before that. He is also planning for umbilical hernia repair.    Liver Transplant Candidacy:   - chem dep evaluation as scheduled  - start LT evaluation    Ascites-s/p TIPS:  -He has been on Lasix   - Continue to follow a sodium restricted (<2g sodium diet)     Hepatic Encephalopathy:  - On lactulose and Rifaximin, recommend compliance to prevent worsening HE    Follow Up:  -- in 3 mo with Dr. Bales    Pt was seen and discussed with Dr. Bales.    Ayaka Barnett M.D.  Advanced & Transplant Hepatology Fellow  [facility]

## 2022-06-13 NOTE — LETTER
6/13/2022         RE: Ivan Bell  [id number] \[facility]"" [id number]        Dear Colleague,    Thank you for referring your patient, Ivan Bell, to the [facility] [place]. Please see a copy of my visit note below.    Physician Attestation  I, Gonzalo Bales, saw this patient with the fellow and agree with the fellow s findings and plan of care as documented in the fellow s note.  I personally reviewed vital signs, medications, labs and imaging.    Ascites appears to have resolved since TIPS revision.  Umbilical hernia remains symptomatic.  MELD-Na= 18 (stable).  Last ETOH= Feb 2019.  Due for chemical dependency evaluation on 6/15.  LT evaluation team have contacted the patient but he has yet to return their calls- he remains interested in LT and will need to complete his evaluation prior to any consideration of umbilical hernia repair surgery.      Gonzalo Bales  Date of Service (when I saw the patient): Jun 13, 2022    I spent 40 minutes on the date of the encounter doing chart review, history and exam, documentation and further activities as noted above.    On 6/12/2022, approximately 35 minutes of non face-to-face time were spent in review of the patient's medical record to date.  This included review of previous: clinic visits, hospital records, lab results, imaging studies, and procedural documentation.  The findings from this review are summarized in the above note.  _________________________________________________________________________________    Date of Service: 6/13/2022     Subjective:            Ivan Bell is a 56 year old male presenting for follow-up of liver disease    - Etiology: ETOH  - hx variceal bleed s/p TIPS  - hx ascites s/p TIPS  - hx HE minimal  - last EGD 2019  - HCC screening CT abd Feb 2022    History of Present Illness   Ivan Bell is a 56 year old male with past medical history of alcoholic cirrhosis complicated by ascites and  history of esophageal varices s/p TIPS with TIPS malfunction resulting in recurrent ascites s/p balloon plasty of TIPS stent on 3/30/2022 who is coming in for follow-up.    Patient stated that he does not has any bleeding, no melena/hematochezia or hematemesis since the TIPS was placed.  He had issues with ascites and was requiring paracentesis but since March 2022 after the TIPS revision, he has not required any paracentesis.  He denies any lower extremity swelling.  He does not know whether he is taking Lasix or not since his wife is managing his medications.    He has hepatic encephalopathy which is very minimal and is under control.  He has been having 2-3 bowel movements a day.  He takes lactulose as needed with approximately 3 doses in a week.  Does not remember if he is taking rifaximin or not.    He will have his first chemical dependency evaluation on 6/15/2022.    He was scheduled for a procedure for nephrolithiasis but that got canceled.    Last alcohol intake was 4 years back, lives with his wife.    Past Medical History:  Past Medical History:   Diagnosis Date     Alcoholic cirrhosis of liver (H)      Alcoholic hepatitis 03/2019     Chronic kidney disease     CRF     Depression      Gout      History of transfusion      Hypertension      Hypertension      Hypertriglyceridemia      Left calcaneus fracture 01/09/2006 January 16, 2006: Fell 10 feet from ladder onto left foot on frozen ground on 1/9/06 at home.  Immediate pain and unable to walk- seen at [place] and diagnosed with calcaneus fracture     Nephrolithiasis      Osteoarthritis      Portal vein thrombosis     left occlusion, partial main       Surgical History:  Past Surgical History:   Procedure Laterality Date     ANKLE SURGERY Left      ANKLE SURGERY       ARTHROSCOPY KNEE       ARTHROSCOPY SHOULDER       ARTHROSCOPY SHOULDER ROTATOR CUFF REPAIR       COLONOSCOPY N/A 03/31/2016    Procedure: COLONOSCOPY;  Surgeon: Rhys Uriostegui  MD ALAN;  Location:  GI     ESOPHAGOSCOPY, GASTROSCOPY, DUODENOSCOPY (EGD), COMBINED N/A 2016    Procedure: COMBINED ESOPHAGOSCOPY, GASTROSCOPY, DUODENOSCOPY (EGD);  Surgeon: Rhys Uriostegui MD;  Location:  GI     ESOPHAGOSCOPY, GASTROSCOPY, DUODENOSCOPY (EGD), COMBINED N/A 2018    Procedure: COMBINED ESOPHAGOSCOPY, GASTROSCOPY, DUODENOSCOPY (EGD), BIOPSY SINGLE OR MULTIPLE;  EGD;  Surgeon: Gonzalo Wahl MD;  Location:  GI     ESOPHAGOSCOPY, GASTROSCOPY, DUODENOSCOPY (EGD), COMBINED N/A 2019    Procedure: ESOPHAGOGASTRODUODENOSCOPY (EGD);  Surgeon: Gonzalo Wahl MD;  Location:  GI     FOOT SURGERY       HIP SURGERY       IR PARACENTESIS  10/29/2019     IR PARACENTESIS  2020     IR PARACENTESIS  2021     IR PARACENTESIS  2022     IR PARACENTESIS  3/30/2022     IR TRANSVEN INTRAHEPATIC PORTOSYST REV  10/29/2019     IR TRANSVEN INTRAHEPATIC PORTOSYST REV  2020     IR TRANSVEN INTRAHEPATIC PORTOSYST REV  2021     IR TRANSVEN INTRAHEPATIC PORTOSYST REV  2022     IR TRANSVEN INTRAHEPATIC PORTOSYST REV  3/30/2022     KNEE SURGERY Left      KNEE SURGERY Right      RELEASE CARPAL TUNNEL       RELEASE TRIGGER FINGER Right 2020    Procedure: RELEASE, TRIGGER FINGER, right ring and long finger;  Surgeon: Pascual Valencia MD;  Location: [facility]     SIGMOIDOSCOPY FLEXIBLE N/A 10/31/2017    Procedure: SIGMOIDOSCOPY FLEXIBLE;;  Surgeon: Armaan Adams MD;  Location:  GI     TIPS Procedure  2018     TIPS PROCEDURE  2020     ZZC PLASTY KNEE,MED OR LAT COMPARTMT Right 2021    Procedure: RIGHT UNICOMPARTMENTAL ARTHROPLASTY KNEE MEDIAL;  Surgeon: José Miguel Landaverde MD;  Location: [facility];  Service: Orthopedics       Social History:  Social History     Tobacco Use     Smoking status: Former Smoker     Packs/day: 0.00     Types: Dip, chew, snus or snuff     Quit date: 1998     Years since quittin.8      Smokeless tobacco: Former User     Types: Chew     Tobacco comment: 1 tin per 10 days.   Vaping Use     Vaping Use: Never used   Substance Use Topics     Alcohol use: No     Alcohol/week: 17.5 standard drinks     Types: 21 Cans of beer per week     Comment: last etoh 2/14/16, did have Odouls ~8/2017     Drug use: No       Family History:  Family History   Problem Relation Age of Onset     Family History Negative Mother      Family History Negative Father      Hypertension Father      Cerebrovascular Disease Father 87     Breast Cancer Maternal Grandmother      Rheumatoid Arthritis Daughter      Depression Daughter      Substance Abuse Brother      Cancer - colorectal No family hx of      Prostate Cancer No family hx of      Liver Disease No family hx of        Medications:  Current Outpatient Medications   Medication     acetaminophen (TYLENOL) 500 MG tablet     Ascorbic Acid (VITAMIN C PO)     eplerenone (INSPRA) 50 MG tablet     fexofenadine (ALLEGRA) 60 MG tablet     furosemide (LASIX) 40 MG tablet     lactulose (CHRONULAC) 10 GM/15ML solution     Menaquinone-7 (VITAMIN K2) 100 MCG CAPS     MULTIPLE VITAMINS PO     ondansetron (ZOFRAN-ODT) 4 MG ODT tab     potassium chloride ER (KLOR-CON M) 20 MEQ CR tablet     rifaximin (XIFAXAN) 550 MG TABS tablet     sertraline (ZOLOFT) 50 MG tablet     sildenafil (REVATIO) 20 MG tablet     sodium bicarbonate 650 MG tablet     vitamin D3 (CHOLECALCIFEROL) 2000 units (50 mcg) tablet     benzonatate (TESSALON) 100 MG capsule     furosemide (LASIX) 20 MG tablet     tamsulosin (FLOMAX) 0.4 MG capsule     No current facility-administered medications for this visit.       Review of Systems  A complete 10 point review of systems was asked and answered in the negative unless specifically commented upon in the HPI    Objective:         Vitals:    06/13/22 1128   BP: (!) 147/79   BP Location: Right arm   Patient Position: Sitting   Cuff Size: Adult Regular   Pulse: 71   Resp: 16   Temp:  "98  F (36.7  C)   TempSrc: Oral   SpO2: 100%   Weight: 75.4 kg (166 lb 3.2 oz)   Height: 1.778 m (5' 10\")     Body mass index is 23.85 kg/m .     Physical Exam  Constitutional: Well-developed, well-nourished, in no apparent distress.    HEENT: Normocephalic. No scleral icterus.   Neck/Lymph: Normal ROM, supple.   Cardiac:  Regular rate and rhythm.  No overt murmurs  Respiratory: Clear to auscultation bilaterally.  No wheezes or rales  GI:  Abdomen soft, non-distended, non-tender. BS present. No shifting dullness. No overt hepatosplenomegaly.  Umbilical hernia  Skin:  Skin is warm and dry. No rash noted.  No jaundice.   Peripheral Vascular: No lower extremity edema. 2+ pulses in all extremities  Musculoskeletal:  ROM intact.  Psychiatric: Normal mood and affect. Behavior is normal.  Neuro:  No asterixis.    Labs and Diagnostic tests:  Lab Results   Component Value Date     06/13/2022     06/23/2021    POTASSIUM 4.2 06/13/2022    POTASSIUM 4.0 06/23/2021    CHLORIDE 110 06/13/2022    CHLORIDE 117 06/23/2021    CO2 29 06/13/2022    CO2 25 06/23/2021    BUN 14 06/13/2022    BUN 8 06/23/2021    CR 1.34 06/13/2022    CR 1.09 06/23/2021     Lab Results   Component Value Date    BILITOTAL 2.1 06/13/2022    BILITOTAL 3.6 06/23/2021    ALT 26 06/13/2022    ALT 19 06/23/2021    AST 37 06/13/2022    AST 33 06/23/2021    ALKPHOS 178 06/13/2022    ALKPHOS 165 06/23/2021     Lab Results   Component Value Date    ALBUMIN 2.4 06/13/2022    ALBUMIN 2.7 06/23/2021    PROTTOTAL 6.0 06/13/2022    PROTTOTAL 5.6 06/23/2021      Lab Results   Component Value Date    WBC 4.3 06/13/2022    WBC 4.3 06/23/2021    HGB 12.6 06/13/2022    HGB 12.1 06/23/2021    MCV 94 06/13/2022    MCV 99 06/23/2021    PLT 83 06/13/2022    PLT 58 06/23/2021     Lab Results   Component Value Date    INR 1.65 06/13/2022    INR 1.79 06/23/2021       MELD-Na score: 18 at 6/13/2022 10:42 AM  MELD score: 18 at 6/13/2022 10:42 AM  Calculated from:  Serum " Creatinine: 1.34 mg/dL at 6/13/2022 10:42 AM  Serum Sodium: 146 mmol/L (Using max of 137 mmol/L) at 6/13/2022 10:42 AM  Total Bilirubin: 2.1 mg/dL at 6/13/2022 10:42 AM  INR(ratio): 1.65 at 6/13/2022 10:42 AM  Age: 56 years    Imaging:  IR TIPS 3/30/22 reviewed  US TIPS Doppler 4/15/22 reviewed    Assessment and Plan:  56 year old male with past medical history of alcoholic cirrhosis complicated by ascites and history of esophageal varices s/p TIPS with TIPS malfunction resulting in recurrent ascites s/p balloon plasty of TIPS stent on 3/30/2022 who is coming in for follow-up.    Alcoholic Cirrhosis MELD 18:    Patient has alcoholic cirrhosis with a meld of 18, he is s/p TIPS for his refractory esophageal varices but developed recurrent ascites prompting TIPS revision in March 2022.  He has not required any paracentesis since then but his TIPS velocities remain elevated.  He would require a liver transplantation but needs a CD evaluation before that. He is also planning for umbilical hernia repair.    Liver Transplant Candidacy:   - chem dep evaluation as scheduled  - start LT evaluation    Ascites-s/p TIPS:  -He has been on Lasix   - Continue to follow a sodium restricted (<2g sodium diet)     Hepatic Encephalopathy:  - On lactulose and Rifaximin, recommend compliance to prevent worsening HE    Follow Up:  -- in 3 mo with Dr. Bales    Pt was seen and discussed with Dr. Bales.    Ayaka Barnett M.D.  Advanced & Transplant Hepatology Fellow  [facility]

## 2022-06-15 ENCOUNTER — HOSPITAL ENCOUNTER (OUTPATIENT)
Dept: BEHAVIORAL HEALTH | Facility: CLINIC | Age: 57
Discharge: HOME | End: 2022-06-15
Attending: FAMILY MEDICINE | Admitting: FAMILY MEDICINE
Payer: COMMERCIAL

## 2022-06-15 ENCOUNTER — TELEPHONE (OUTPATIENT)
Dept: BEHAVIORAL HEALTH | Facility: CLINIC | Age: 57
End: 2022-06-15

## 2022-06-15 VITALS — HEIGHT: 70 IN | BODY MASS INDEX: 23.62 KG/M2 | WEIGHT: 165 LBS

## 2022-06-15 DIAGNOSIS — K70.31 ALCOHOLIC CIRRHOSIS OF LIVER WITH ASCITES (H): Primary | ICD-10-CM

## 2022-06-15 PROBLEM — M54.50 ACUTE LOW BACK PAIN: Status: RESOLVED | Noted: 2021-04-13 | Resolved: 2022-06-15

## 2022-06-15 PROCEDURE — H0001 ALCOHOL AND/OR DRUG ASSESS: HCPCS | Mod: TEL,95

## 2022-06-15 ASSESSMENT — COLUMBIA-SUICIDE SEVERITY RATING SCALE - C-SSRS
3. HAVE YOU BEEN THINKING ABOUT HOW YOU MIGHT KILL YOURSELF?: NO
6. HAVE YOU EVER DONE ANYTHING, STARTED TO DO ANYTHING, OR PREPARED TO DO ANYTHING TO END YOUR LIFE?: NO
5. HAVE YOU STARTED TO WORK OUT OR WORKED OUT THE DETAILS OF HOW TO KILL YOURSELF? DO YOU INTEND TO CARRY OUT THIS PLAN?: NO
4. HAVE YOU HAD THESE THOUGHTS AND HAD SOME INTENTION OF ACTING ON THEM?: NO
1. IN THE PAST MONTH, HAVE YOU WISHED YOU WERE DEAD OR WISHED YOU COULD GO TO SLEEP AND NOT WAKE UP?: NO
2. HAVE YOU ACTUALLY HAD ANY THOUGHTS OF KILLING YOURSELF IN THE PAST MONTH?: NO

## 2022-06-15 ASSESSMENT — PAIN SCALES - GENERAL: PAINLEVEL: NO PAIN (0)

## 2022-06-15 NOTE — PROGRESS NOTES
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 use since the previous assessments.  Patient said he tried setting up the individual therapy but he had difficulty and the place would not call him back, he would leave messages, and then nothing would happen.  Patient said he is willing to follow the recommendations and he wants to, but would like assistance in getting this completed.  Patient was encouraged to discuss his substance use in relation to grief and loss of his brother and his father and how that impacted his drinking.  Patient was also encouraged to explore family planning around his medical diagnosis and to develop a plan if needed in the future.      Rational for recommended level of care: The patient has medical issues which are exacerbated by substance abuse.    The patient reported he is willing to follow the above recommendations.    The patient would like the following family or other support people involved in their treatment:  None at this time.    [facility] Assessment ID: [id number]     Provider Name/ Credentials:  Rekha Sloan MA, [facility] Date: Meghan 15, 2022

## 2022-06-16 ENCOUNTER — PATIENT OUTREACH (OUTPATIENT)
Dept: CARE COORDINATION | Facility: CLINIC | Age: 57
End: 2022-06-16
Payer: COMMERCIAL

## 2022-06-16 NOTE — PROGRESS NOTES
Clinic Care Coordination Contact  [facility]/Voicemail       Clinical Data: Care Coordinator Outreach  Outreach attempted x 1.  Left message on patient's voicemail with call back information and requested return call.  Plan: Care Coordinator will try to reach patient again in 1-2 business days.    Russell Powers, [facility]  Clinic Care Coordination  [facility]  [email or web address]  [phone]

## 2022-06-16 NOTE — PROGRESS NOTES
Met with patient during appointment with Dr. Bales. He states he has not needed paracentesis since March. He is still bothered by umbilical hernia and is anxious to have this repaired. He is scheduled for CD eval this week and cysto procedures later in the month.  Plan per Dr. Bales:   - proceed with CD evaluation, scheduled 6/15/22  - start liver transplant evaluation  - follow up with Dr. Bales in 3 months, scheduled 9/13/22.

## 2022-06-20 ENCOUNTER — PATIENT OUTREACH (OUTPATIENT)
Dept: CARE COORDINATION | Facility: CLINIC | Age: 57
End: 2022-06-20
Payer: COMMERCIAL

## 2022-06-20 ENCOUNTER — TELEPHONE (OUTPATIENT)
Dept: FAMILY MEDICINE | Facility: CLINIC | Age: 57
End: 2022-06-20

## 2022-06-20 NOTE — PROGRESS NOTES
Clinic Care Coordination Contact  [facility]/Voicemail       Clinical Data: Care Coordinator Outreach  Outreach attempted x 2.  Left message on patient's voicemail with call back information and requested return call.  Plan: Care Coordinator will try to reach patient again in 3-5 business days.    Russell Powers, [facility]  Clinic Care Coordination  [facility]  [email or web address]  [phone]

## 2022-06-20 NOTE — TELEPHONE ENCOUNTER
Left message on voice mail for patient to call clinic.   [phone]    Rianna Madrid RN BSN  [facility]

## 2022-06-20 NOTE — LETTER
June 23, 2022      Ivan Bell  [address] [address] [address]        Dear ,    We are writing to inform you of your test results. We have tried to reach you by telephone and have been unsuccessful.    Your lab is normal except for increase in albumin/creatinine ratio. I will recommend seeing kidney doctor.     Kindly let me know if you agree with plan.    If you have any questions or concerns, please call the clinic at the number listed above.       Sincerely,    Abena Acuña MD,MPH/hlo

## 2022-06-20 NOTE — TELEPHONE ENCOUNTER
Ivan,     Your lab is normal except for increase in albumin/creatinine ratio. I will recommend seeing kidney doctor.     Kindly let me know if you agree with plan.  Please contact me if you have additional questions or concerns     Abena Acuña MD,MPH      Patient has not reviewed the Result Note sent via My Chart on 6/15/22.     Left message on voice mail for patient to call clinic.   [phone] to discuss his recent lab results.     Rianna Madrid RN BSN  [facility]

## 2022-06-21 NOTE — TELEPHONE ENCOUNTER
Does not appear he has read this message, nor many of his other past messages.       Attempted to call patient at home/mobile number, no answer, left message on voicemail; patient was instructed to return call to [facility] at [phone].    Vaishali Abbasi RN  [facility]

## 2022-06-22 NOTE — TELEPHONE ENCOUNTER
Called patient, however unable to leave a message because his mail box is full.     Please send letter.     Rianna JOYNER  [employer]

## 2022-07-01 ENCOUNTER — ANESTHESIA (OUTPATIENT)
Dept: SURGERY | Facility: CLINIC | Age: 57
End: 2022-07-01
Payer: COMMERCIAL

## 2022-07-01 ENCOUNTER — ANESTHESIA EVENT (OUTPATIENT)
Dept: SURGERY | Facility: CLINIC | Age: 57
End: 2022-07-01
Payer: COMMERCIAL

## 2022-07-01 ENCOUNTER — APPOINTMENT (OUTPATIENT)
Dept: GENERAL RADIOLOGY | Facility: CLINIC | Age: 57
End: 2022-07-01
Attending: UROLOGY
Payer: COMMERCIAL

## 2022-07-01 ENCOUNTER — HOSPITAL ENCOUNTER (OUTPATIENT)
Facility: CLINIC | Age: 57
Discharge: HOME | End: 2022-07-01
Attending: UROLOGY | Admitting: UROLOGY
Payer: COMMERCIAL

## 2022-07-01 VITALS
TEMPERATURE: 98.2 F | HEIGHT: 70 IN | DIASTOLIC BLOOD PRESSURE: 64 MMHG | RESPIRATION RATE: 16 BRPM | BODY MASS INDEX: 25.04 KG/M2 | SYSTOLIC BLOOD PRESSURE: 137 MMHG | OXYGEN SATURATION: 97 % | HEART RATE: 58 BPM | WEIGHT: 174.9 LBS

## 2022-07-01 DIAGNOSIS — N20.0 RIGHT KIDNEY STONE: Primary | ICD-10-CM

## 2022-07-01 DIAGNOSIS — N20.0 CALCULUS OF LEFT KIDNEY: ICD-10-CM

## 2022-07-01 LAB
ANION GAP SERPL CALCULATED.3IONS-SCNC: 6 MMOL/L (ref 3–14)
BUN SERPL-MCNC: 13 MG/DL (ref 7–30)
CALCIUM SERPL-MCNC: 8.5 MG/DL (ref 8.5–10.1)
CHLORIDE BLD-SCNC: 115 MMOL/L (ref 94–109)
CO2 SERPL-SCNC: 25 MMOL/L (ref 20–32)
CREAT SERPL-MCNC: 1.13 MG/DL (ref 0.66–1.25)
GFR SERPL CREATININE-BSD FRML MDRD: 75 ML/MIN/1.73M2
GLUCOSE BLD-MCNC: 96 MG/DL (ref 70–99)
POTASSIUM BLD-SCNC: 3.8 MMOL/L (ref 3.4–5.3)
SODIUM SERPL-SCNC: 146 MMOL/L (ref 133–144)

## 2022-07-01 PROCEDURE — 360N000076 HC SURGERY LEVEL 3, PER MIN: Performed by: UROLOGY

## 2022-07-01 PROCEDURE — 250N000009 HC RX 250: Performed by: NURSE ANESTHETIST, CERTIFIED REGISTERED

## 2022-07-01 PROCEDURE — 258N000001 HC RX 258: Performed by: UROLOGY

## 2022-07-01 PROCEDURE — C1894 INTRO/SHEATH, NON-LASER: HCPCS | Performed by: UROLOGY

## 2022-07-01 PROCEDURE — 272N000001 HC OR GENERAL SUPPLY STERILE: Performed by: UROLOGY

## 2022-07-01 PROCEDURE — 250N000011 HC RX IP 250 OP 636: Performed by: UROLOGY

## 2022-07-01 PROCEDURE — 999N000179 XR SURGERY CARM FLUORO LESS THAN 5 MIN W STILLS: Mod: TC

## 2022-07-01 PROCEDURE — 370N000017 HC ANESTHESIA TECHNICAL FEE, PER MIN: Performed by: UROLOGY

## 2022-07-01 PROCEDURE — 74420 UROGRAPHY RTRGR +-KUB: CPT | Mod: 26 | Performed by: UROLOGY

## 2022-07-01 PROCEDURE — 82365 CALCULUS SPECTROSCOPY: CPT | Performed by: UROLOGY

## 2022-07-01 PROCEDURE — 250N000009 HC RX 250: Performed by: UROLOGY

## 2022-07-01 PROCEDURE — 250N000011 HC RX IP 250 OP 636: Performed by: NURSE ANESTHETIST, CERTIFIED REGISTERED

## 2022-07-01 PROCEDURE — 250N000025 HC SEVOFLURANE, PER MIN: Performed by: UROLOGY

## 2022-07-01 PROCEDURE — 258N000003 HC RX IP 258 OP 636: Performed by: ANESTHESIOLOGY

## 2022-07-01 PROCEDURE — 710N000012 HC RECOVERY PHASE 2, PER MINUTE: Performed by: UROLOGY

## 2022-07-01 PROCEDURE — 80048 BASIC METABOLIC PNL TOTAL CA: CPT | Performed by: ANESTHESIOLOGY

## 2022-07-01 PROCEDURE — 52356 CYSTO/URETERO W/LITHOTRIPSY: CPT | Mod: 22 | Performed by: UROLOGY

## 2022-07-01 PROCEDURE — C1769 GUIDE WIRE: HCPCS | Performed by: UROLOGY

## 2022-07-01 PROCEDURE — C2617 STENT, NON-COR, TEM W/O DEL: HCPCS | Performed by: UROLOGY

## 2022-07-01 PROCEDURE — 999N000141 HC STATISTIC PRE-PROCEDURE NURSING ASSESSMENT: Performed by: UROLOGY

## 2022-07-01 PROCEDURE — 710N000009 HC RECOVERY PHASE 1, LEVEL 1, PER MIN: Performed by: UROLOGY

## 2022-07-01 PROCEDURE — 36415 COLL VENOUS BLD VENIPUNCTURE: CPT | Performed by: ANESTHESIOLOGY

## 2022-07-01 DEVICE — URETERAL STENT
Type: IMPLANTABLE DEVICE | Site: URETER | Status: NON-FUNCTIONAL
Brand: POLARIS™ ULTRA
Removed: 2022-07-27

## 2022-07-01 RX ORDER — DIMENHYDRINATE 50 MG/ML
25 INJECTION, SOLUTION INTRAMUSCULAR; INTRAVENOUS
Status: DISCONTINUED | OUTPATIENT
Start: 2022-07-01 | End: 2022-07-01 | Stop reason: HOSPADM

## 2022-07-01 RX ORDER — ONDANSETRON 2 MG/ML
INJECTION INTRAMUSCULAR; INTRAVENOUS PRN
Status: DISCONTINUED | OUTPATIENT
Start: 2022-07-01 | End: 2022-07-01

## 2022-07-01 RX ORDER — OXYCODONE HYDROCHLORIDE 5 MG/1
5 TABLET ORAL EVERY 4 HOURS PRN
Status: DISCONTINUED | OUTPATIENT
Start: 2022-07-01 | End: 2022-07-01 | Stop reason: HOSPADM

## 2022-07-01 RX ORDER — FENTANYL CITRATE 0.05 MG/ML
50 INJECTION, SOLUTION INTRAMUSCULAR; INTRAVENOUS EVERY 5 MIN PRN
Status: DISCONTINUED | OUTPATIENT
Start: 2022-07-01 | End: 2022-07-01 | Stop reason: HOSPADM

## 2022-07-01 RX ORDER — HYDRALAZINE HYDROCHLORIDE 20 MG/ML
2.5-5 INJECTION INTRAMUSCULAR; INTRAVENOUS EVERY 10 MIN PRN
Status: DISCONTINUED | OUTPATIENT
Start: 2022-07-01 | End: 2022-07-01 | Stop reason: HOSPADM

## 2022-07-01 RX ORDER — SODIUM CHLORIDE, SODIUM LACTATE, POTASSIUM CHLORIDE, CALCIUM CHLORIDE 600; 310; 30; 20 MG/100ML; MG/100ML; MG/100ML; MG/100ML
INJECTION, SOLUTION INTRAVENOUS CONTINUOUS
Status: DISCONTINUED | OUTPATIENT
Start: 2022-07-01 | End: 2022-07-01 | Stop reason: HOSPADM

## 2022-07-01 RX ORDER — FENTANYL CITRATE 50 UG/ML
INJECTION, SOLUTION INTRAMUSCULAR; INTRAVENOUS PRN
Status: DISCONTINUED | OUTPATIENT
Start: 2022-07-01 | End: 2022-07-01

## 2022-07-01 RX ORDER — ASPIRIN 81 MG
100 TABLET, DELAYED RELEASE (ENTERIC COATED) ORAL DAILY
Qty: 60 TABLET | Refills: 1 | Status: SHIPPED | OUTPATIENT
Start: 2022-07-01 | End: 2022-10-12

## 2022-07-01 RX ORDER — FUROSEMIDE 10 MG/ML
INJECTION INTRAMUSCULAR; INTRAVENOUS PRN
Status: DISCONTINUED | OUTPATIENT
Start: 2022-07-01 | End: 2022-07-01

## 2022-07-01 RX ORDER — HYDROMORPHONE HCL IN WATER/PF 6 MG/30 ML
0.2 PATIENT CONTROLLED ANALGESIA SYRINGE INTRAVENOUS EVERY 5 MIN PRN
Status: DISCONTINUED | OUTPATIENT
Start: 2022-07-01 | End: 2022-07-01 | Stop reason: HOSPADM

## 2022-07-01 RX ORDER — ONDANSETRON 4 MG/1
4 TABLET, ORALLY DISINTEGRATING ORAL EVERY 30 MIN PRN
Status: DISCONTINUED | OUTPATIENT
Start: 2022-07-01 | End: 2022-07-01 | Stop reason: HOSPADM

## 2022-07-01 RX ORDER — LABETALOL HYDROCHLORIDE 5 MG/ML
10 INJECTION, SOLUTION INTRAVENOUS
Status: DISCONTINUED | OUTPATIENT
Start: 2022-07-01 | End: 2022-07-01 | Stop reason: HOSPADM

## 2022-07-01 RX ORDER — TAMSULOSIN HYDROCHLORIDE 0.4 MG/1
0.4 CAPSULE ORAL DAILY
Qty: 30 CAPSULE | Refills: 3 | Status: SHIPPED | OUTPATIENT
Start: 2022-07-01 | End: 2022-10-12

## 2022-07-01 RX ORDER — PROPOFOL 10 MG/ML
INJECTION, EMULSION INTRAVENOUS PRN
Status: DISCONTINUED | OUTPATIENT
Start: 2022-07-01 | End: 2022-07-01

## 2022-07-01 RX ORDER — CEFAZOLIN SODIUM/WATER 2 G/20 ML
2 SYRINGE (ML) INTRAVENOUS SEE ADMIN INSTRUCTIONS
Status: DISCONTINUED | OUTPATIENT
Start: 2022-07-01 | End: 2022-07-01 | Stop reason: HOSPADM

## 2022-07-01 RX ORDER — CEFAZOLIN SODIUM/WATER 2 G/20 ML
2 SYRINGE (ML) INTRAVENOUS
Status: COMPLETED | OUTPATIENT
Start: 2022-07-01 | End: 2022-07-01

## 2022-07-01 RX ORDER — ONDANSETRON 2 MG/ML
4 INJECTION INTRAMUSCULAR; INTRAVENOUS EVERY 30 MIN PRN
Status: DISCONTINUED | OUTPATIENT
Start: 2022-07-01 | End: 2022-07-01 | Stop reason: HOSPADM

## 2022-07-01 RX ORDER — OXYBUTYNIN CHLORIDE 5 MG/1
5 TABLET, EXTENDED RELEASE ORAL DAILY
Qty: 30 TABLET | Refills: 1 | Status: SHIPPED | OUTPATIENT
Start: 2022-07-01 | End: 2022-10-12

## 2022-07-01 RX ORDER — HYDROXYZINE HYDROCHLORIDE 25 MG/1
25 TABLET, FILM COATED ORAL EVERY 6 HOURS PRN
Status: DISCONTINUED | OUTPATIENT
Start: 2022-07-01 | End: 2022-07-01 | Stop reason: HOSPADM

## 2022-07-01 RX ORDER — OXYCODONE HYDROCHLORIDE 5 MG/1
5 TABLET ORAL EVERY 6 HOURS PRN
Qty: 9 TABLET | Refills: 0 | Status: SHIPPED | OUTPATIENT
Start: 2022-07-01 | End: 2022-07-04

## 2022-07-01 RX ORDER — PROPOFOL 10 MG/ML
INJECTION, EMULSION INTRAVENOUS CONTINUOUS PRN
Status: DISCONTINUED | OUTPATIENT
Start: 2022-07-01 | End: 2022-07-01

## 2022-07-01 RX ORDER — LIDOCAINE HYDROCHLORIDE 20 MG/ML
INJECTION, SOLUTION INFILTRATION; PERINEURAL PRN
Status: DISCONTINUED | OUTPATIENT
Start: 2022-07-01 | End: 2022-07-01

## 2022-07-01 RX ORDER — ACETAMINOPHEN 325 MG/1
975 TABLET ORAL ONCE
Status: DISCONTINUED | OUTPATIENT
Start: 2022-07-01 | End: 2022-07-01 | Stop reason: HOSPADM

## 2022-07-01 RX ORDER — MAGNESIUM HYDROXIDE 1200 MG/15ML
LIQUID ORAL PRN
Status: DISCONTINUED | OUTPATIENT
Start: 2022-07-01 | End: 2022-07-01 | Stop reason: HOSPADM

## 2022-07-01 RX ORDER — IOPAMIDOL 612 MG/ML
INJECTION, SOLUTION INTRATHECAL PRN
Status: DISCONTINUED | OUTPATIENT
Start: 2022-07-01 | End: 2022-07-01 | Stop reason: HOSPADM

## 2022-07-01 RX ADMIN — Medication 0.5 G: at 12:39

## 2022-07-01 RX ADMIN — FENTANYL CITRATE 50 MCG: 50 INJECTION, SOLUTION INTRAMUSCULAR; INTRAVENOUS at 12:42

## 2022-07-01 RX ADMIN — Medication 0.5 G: at 12:36

## 2022-07-01 RX ADMIN — ONDANSETRON 4 MG: 2 INJECTION INTRAMUSCULAR; INTRAVENOUS at 14:45

## 2022-07-01 RX ADMIN — FENTANYL CITRATE 25 MCG: 50 INJECTION, SOLUTION INTRAMUSCULAR; INTRAVENOUS at 14:05

## 2022-07-01 RX ADMIN — MIDAZOLAM 2 MG: 1 INJECTION INTRAMUSCULAR; INTRAVENOUS at 12:40

## 2022-07-01 RX ADMIN — Medication 0.5 G: at 12:37

## 2022-07-01 RX ADMIN — LIDOCAINE HYDROCHLORIDE 100 MG: 20 INJECTION, SOLUTION INFILTRATION; PERINEURAL at 12:42

## 2022-07-01 RX ADMIN — PROPOFOL 30 MCG/KG/MIN: 10 INJECTION, EMULSION INTRAVENOUS at 12:49

## 2022-07-01 RX ADMIN — FUROSEMIDE 10 MG: 10 INJECTION, SOLUTION INTRAVENOUS at 14:36

## 2022-07-01 RX ADMIN — FENTANYL CITRATE 25 MCG: 50 INJECTION, SOLUTION INTRAMUSCULAR; INTRAVENOUS at 13:43

## 2022-07-01 RX ADMIN — SODIUM CHLORIDE, POTASSIUM CHLORIDE, SODIUM LACTATE AND CALCIUM CHLORIDE: 600; 310; 30; 20 INJECTION, SOLUTION INTRAVENOUS at 10:30

## 2022-07-01 RX ADMIN — FUROSEMIDE 10 MG: 10 INJECTION, SOLUTION INTRAVENOUS at 14:35

## 2022-07-01 RX ADMIN — Medication 0.5 G: at 12:38

## 2022-07-01 RX ADMIN — SODIUM CHLORIDE, POTASSIUM CHLORIDE, SODIUM LACTATE AND CALCIUM CHLORIDE: 600; 310; 30; 20 INJECTION, SOLUTION INTRAVENOUS at 13:23

## 2022-07-01 RX ADMIN — PROPOFOL 200 MG: 10 INJECTION, EMULSION INTRAVENOUS at 12:42

## 2022-07-01 ASSESSMENT — LIFESTYLE VARIABLES: TOBACCO_USE: 1

## 2022-07-01 NOTE — ANESTHESIA PROCEDURE NOTES
Airway       Patient location: [facility] - Operating Room.       Procedure Start/Stop Times: 7/1/2022 12:43 PM and 7/1/2022 12:43 PM  Staff -        CRNA: Arthur Holman APRN CRNA       Performed By: CRNA  Consent for Airway        Urgency: elective  Indications and Patient Condition       Indications for airway management: tea-procedural       Induction type:intravenous       Mask difficulty assessment: 0 - not attempted    Final Airway Details       Final airway type: supraglottic airway    Supraglottic Airway Details        Type: LMA       Brand: Ambu AuraGain       LMA size: 5    Post intubation assessment        Placement verified by: capnometry, equal breath sounds and chest rise        Number of attempts at approach: 1       Number of other approaches attempted: 0       Secured with: pink tape       Ease of procedure: easy       Dentition: Intact and Unchanged    Medication(s) Administered   Medication Administration Time: 7/1/2022 12:43 PM    Additional Comments         LMA Ambu AuraGain Size 5.

## 2022-07-01 NOTE — ADDENDUM NOTE
Addendum  created 07/01/22 1543 by Arthur Holman APRN CRNA    Child order released for a procedure order, Clinical Note Signed, Intraprocedure Blocks edited, LDA created via procedure documentation, LDA properties accepted, SmartForm saved

## 2022-07-01 NOTE — ANESTHESIA POSTPROCEDURE EVALUATION
Patient: Ivan Bell    Procedure: Procedure(s):  CYSTOSCOPY, RIGHT RETROGRADE PYELOGRAM, RIGHT URETEROSCOPY WITH LASER LITHOTRIPSY AND BASKET REMOVAL OF STONE, RIGHT URETERAL STENT PLACEMENT, LEFT RETROGRADE PYELOGRAM, LEFT URETEROSCOPY WITH LASER LITHOTRIPSY AND BASKET REMOVAL OF STONE, LEFT URETERAL STENT PLACEMENT       Anesthesia Type:  General    Note:     Postop Pain Control: Uneventful            Sign Out: Well controlled pain   PONV: No   Neuro/Psych: Uneventful            Sign Out: Acceptable/Baseline neuro status   Airway/Respiratory: Uneventful            Sign Out: Acceptable/Baseline resp. status   CV/Hemodynamics: Uneventful            Sign Out: Acceptable CV status   Other NRE: NONE   DID A NON-ROUTINE EVENT OCCUR? No           Last vitals:  Vitals Value Taken Time   /64 07/01/22 1530   Temp 36.8  C (98.3  F) 07/01/22 1453   Pulse 64 07/01/22 1532   Resp 20 07/01/22 1532   SpO2 97 % 07/01/22 1532   Vitals shown include unvalidated device data.    Electronically Signed By: Tian Mills MD  July 1, 2022  3:33 PM

## 2022-07-01 NOTE — ANESTHESIA PREPROCEDURE EVALUATION
Anesthesia Pre-Procedure Evaluation    Patient: Ivan Bell   MRN: [id number] : 1965        Procedure : Procedure(s):  CYSTOSCOPY, RIGHT RETROGRADE PYELOGRAM, RIGHT URETEROSCOPY WITH LASER LITHOTRIPSY AND BASKET REMOVAL OF STONE, RIGHT URETERAL STENT PLACEMENT, LEFT RETROGRADE PYELOGRAM, LEFT URETEROSCOPY WITH LASER LITHOTRIPSY AND BASKET REMOVAL OF STONE, LEFT URETERAL STENT PLACEMENT          Past Medical History:   Diagnosis Date     Alcoholic cirrhosis of liver (H)      Alcoholic hepatitis 2019     Chronic kidney disease     CRF     Depression      Gout      History of transfusion      Hypertension      Hypertriglyceridemia      Left calcaneus fracture 2006: Fell 10 feet from ladder onto left foot on frozen ground on 06 at home.  Immediate pain and unable to walk- seen at [place] and diagnosed with calcaneus fracture     Nephrolithiasis      Osteoarthritis      Portal vein thrombosis     left occlusion, partial main      Past Surgical History:   Procedure Laterality Date     ANKLE SURGERY Left      ANKLE SURGERY       ARTHROSCOPY KNEE       ARTHROSCOPY SHOULDER       ARTHROSCOPY SHOULDER ROTATOR CUFF REPAIR       COLONOSCOPY N/A 2016    Procedure: COLONOSCOPY;  Surgeon: Rhys Uriostegui MD;  Location:  GI     ESOPHAGOSCOPY, GASTROSCOPY, DUODENOSCOPY (EGD), COMBINED N/A 2016    Procedure: COMBINED ESOPHAGOSCOPY, GASTROSCOPY, DUODENOSCOPY (EGD);  Surgeon: Rhys Uriostegui MD;  Location:  GI     ESOPHAGOSCOPY, GASTROSCOPY, DUODENOSCOPY (EGD), COMBINED N/A 2018    Procedure: COMBINED ESOPHAGOSCOPY, GASTROSCOPY, DUODENOSCOPY (EGD), BIOPSY SINGLE OR MULTIPLE;  EGD;  Surgeon: Gonzalo Wahl MD;  Location:  GI     ESOPHAGOSCOPY, GASTROSCOPY, DUODENOSCOPY (EGD), COMBINED N/A 2019    Procedure: ESOPHAGOGASTRODUODENOSCOPY (EGD);  Surgeon: Gonzalo Wahl MD;  Location:  GI     FOOT SURGERY       HIP  SURGERY       IR PARACENTESIS  10/29/2019     IR PARACENTESIS  2020     IR PARACENTESIS  2021     IR PARACENTESIS  2022     IR PARACENTESIS  3/30/2022     IR TRANSVEN INTRAHEPATIC PORTOSYST REV  10/29/2019     IR TRANSVEN INTRAHEPATIC PORTOSYST REV  2020     IR TRANSVEN INTRAHEPATIC PORTOSYST REV  2021     IR TRANSVEN INTRAHEPATIC PORTOSYST REV  2022     IR TRANSVEN INTRAHEPATIC PORTOSYST REV  3/30/2022     KNEE SURGERY Left      KNEE SURGERY Right      RELEASE CARPAL TUNNEL       RELEASE TRIGGER FINGER Right 2020    Procedure: RELEASE, TRIGGER FINGER, right ring and long finger;  Surgeon: Pascual Valencia MD;  Location: [facility] OR     SIGMOIDOSCOPY FLEXIBLE N/A 10/31/2017    Procedure: SIGMOIDOSCOPY FLEXIBLE;;  Surgeon: Armaan Adams MD;  Location: [facility] GI     TIPS Procedure  2018     TIPS PROCEDURE  2020     ZZC PLASTY KNEE,MED OR LAT COMPARTMT Right 2021    Procedure: RIGHT UNICOMPARTMENTAL ARTHROPLASTY KNEE MEDIAL;  Surgeon: José Miguel Landaverde MD;  Location: [facility] OR;  Service: Orthopedics      Allergies   Allergen Reactions     Prednisone Visual Disturbance     Trazodone Visual Disturbance     Benadryl [Diphenhydramine] Other (See Comments)     Delirium (visual and auditory hallucinations)      Social History     Tobacco Use     Smoking status: Former Smoker     Packs/day: 0.00     Types: Dip, chew, snus or snuff     Quit date: 1998     Years since quittin.8     Smokeless tobacco: Former User     Types: Chew     Tobacco comment: 1 tin per 10 days.   Substance Use Topics     Alcohol use: No     Alcohol/week: 17.5 standard drinks     Types: 21 Cans of beer per week     Comment: 2019      Wt Readings from Last 1 Encounters:   22 75.4 kg (166 lb 3.2 oz)        Anesthesia Evaluation   Pt has had prior anesthetic. Type: General.    No history of anesthetic complications       ROS/MED HX  ENT/Pulmonary:     (+) tobacco use (Quit in  '98), Past use, asthma     Neurologic:       Cardiovascular:     (+) Dyslipidemia hypertension-----Previous cardiac testing   Echo: Date: Results:    Stress Test: Date: Results:    ECG Reviewed: Date: 3/26/21 Results:  NSR  Cath: Date: Results:      METS/Exercise Tolerance:     Hematologic: Comments: Hx of portal vein thrombosis; Thrombocytopenia (PLT 82)    (+) anemia, history of blood transfusion,     Musculoskeletal:   (+) arthritis,     GI/Hepatic:     (+) esophageal disease, Varices, hepatitis type Alcoholic, liver disease (Cirrhosis s/p recent TIPS, INR 1.65),     Renal/Genitourinary:     (+) renal disease (Stage 2), type: CRI, BPH,     Endo:       Psychiatric/Substance Use:     (+) psychiatric history depression alcohol abuse (Sober since '19)     Infectious Disease:       Malignancy:       Other:      (+) , H/O Chronic Pain (Cervicalgia),           OUTSIDE LABS:  CBC:   Lab Results   Component Value Date    WBC 4.3 06/13/2022    WBC 3.9 (L) 06/07/2022    HGB 12.6 (L) 06/13/2022    HGB 10.0 (L) 06/07/2022    HCT 38.9 (L) 06/13/2022    HCT 31.6 (L) 06/07/2022    PLT 83 (L) 06/13/2022    PLT 78 (L) 06/07/2022     BMP:   Lab Results   Component Value Date     (H) 06/13/2022     06/07/2022    POTASSIUM 4.2 06/13/2022    POTASSIUM 3.8 06/07/2022    CHLORIDE 110 (H) 06/13/2022    CHLORIDE 118 (H) 06/07/2022    CO2 29 06/13/2022    CO2 23 06/07/2022    BUN 14 06/13/2022    BUN 13 06/07/2022    CR 1.34 (H) 06/13/2022    CR 1.21 06/07/2022    GLC 91 06/13/2022    GLC 70 06/07/2022     COAGS:   Lab Results   Component Value Date    PTT 41 (H) 02/02/2022    INR 1.65 (H) 06/13/2022    FIBR 181 (L) 03/10/2016     POC:   Lab Results   Component Value Date    BGM 77 06/06/2018     HEPATIC:   Lab Results   Component Value Date    ALBUMIN 2.4 (L) 06/13/2022    PROTTOTAL 6.0 (L) 06/13/2022    ALT 26 06/13/2022    AST 37 06/13/2022    GGT 3411 (H) 01/26/2004    ALKPHOS 178 (H) 06/13/2022    BILITOTAL 2.1 (H)  06/13/2022    LUISITO 62 (H) 06/04/2019     OTHER:   Lab Results   Component Value Date    LACT 1.4 11/01/2019    A1C 5.3 01/13/2008    JULISSA 9.2 06/13/2022    PHOS 2.5 08/30/2019    MAG 1.8 12/31/2019    LIPASE 327 02/02/2022    AMYLASE 66 01/26/2004    TSH 1.22 06/09/2018    CRP <2.9 05/27/2020    SED 9 05/27/2020       Anesthesia Plan    ASA Status:  4   NPO Status:  NPO Appropriate    Anesthesia Type: General.     - Airway: LMA   Induction: Intravenous.   Maintenance: Balanced.        Consents    Anesthesia Plan(s) and associated risks, benefits, and realistic alternatives discussed. Questions answered and patient/representative(s) expressed understanding.    - Discussed:     - Discussed with:  Patient         Postoperative Care    Pain management: IV analgesics, Oral pain medications, Multi-modal analgesia.   PONV prophylaxis: Ondansetron (or other 5HT-3), Dexamethasone or Solumedrol     Comments:                Tian Mills MD

## 2022-07-01 NOTE — DISCHARGE INSTRUCTIONS
Same Day Surgery Discharge Instructions for  Sedation and General Anesthesia     It's not unusual to feel dizzy, light-headed or faint for up to 24 hours after surgery or while taking pain medication.  If you have these symptoms: sit for a few minutes before standing and have someone assist you when you get up to walk or use the bathroom.    You should rest and relax for the next 24 hours. We recommend you make arrangements to have an adult stay with you for at least 24 hours after your discharge.  Avoid hazardous and strenuous activity.    DO NOT DRIVE any vehicle or operate mechanical equipment for 24 hours following the end of your surgery.  Even though you may feel normal, your reactions may be affected by the medication you have received.    Do not drink alcoholic beverages for 24 hours following surgery.     Slowly progress to your regular diet as you feel able. It's not unusual to feel nauseated and/or vomit after receiving anesthesia.  If you develop these symptoms, drink clear liquids (apple juice, ginger ale, broth, 7-up, etc. ) until you feel better.  If your nausea and vomiting persists for 24 hours, please notify your surgeon.      All narcotic pain medications, along with inactivity and anesthesia, can cause constipation. Drinking plenty of liquids and increasing fiber intake will help.    For any questions of a medical nature, call your surgeon.    Do not make important decisions for 24 hours.    If you had general anesthesia, you may have a sore throat for a couple of days related to the breathing tube used during surgery.  You may use Cepacol lozenges to help with this discomfort.  If it worsens or if you develop a fever, contact your surgeon.     If you feel your pain is not well managed with the pain medications prescribed by your surgeon, please contact your surgeon's office to let them know so they can address your concerns.                POSTOPERATIVE  INSTRUCTIONS    Diagnosis-------------------------------   Bilateral kidney stones    Procedure-------------------------------  Procedure(s) (LRB):  CYSTOSCOPY, RIGHT RETROGRADE PYELOGRAM, RIGHT URETEROSCOPY WITH LASER LITHOTRIPSY AND BASKET REMOVAL OF STONE, RIGHT URETERAL STENT PLACEMENT, LEFT RETROGRADE PYELOGRAM, LEFT URETEROSCOPY WITH LASER LITHOTRIPSY AND BASKET REMOVAL OF STONE, LEFT URETERAL STENT PLACEMENT (Bilateral)      Findings--------------------------------  Cystoscopy with no evidence of stones in the bladder.  Right retrograde pyelogram with no evidence of stones in the ureter.  Right ureteroscopy with numerous stones requiring laser lithotripsy and basket removal of all significant stone fragments.  Left ureteroscopy with significant stone burden and 1 time spent using laser lithotripsy to fragment stones however stone burden significant and require staged ureteroscopy which we discussed preoperatively.    Home-going instructions-----------------         Activity Limitation:     - No driving or operating heavy machinery while on narcotic pain medication.     FOLLOW THESE INSTRUCTIONS AS INDICATED BELOW:  - Observe operative area for signs of excessive bleeding.  - You may shower.  - Increase fluid intake to promote clear urine.  - Resume usual diet as tolerated    What to expect while recovering-----------  - You may experience some intermittent bleeding that makes your urine pink or cherry colored. This is normal.  - However, if you are unable to urinate, passing large amount of clots, have jennifer blood in your urine, or have a temperature >101 degrees, call the urology nurse on call, or present to your nearest emergency department.  - You are encouraged to walk daily, and have no activity restrictions.   - A URETERAL STENT has been placed that allows urine to flow unobstructed from your kidney into your bladder.  The stent has a curl in the kidney and a curl in the bladder.  The curl in the  bladder can cause some urgency and frequency of urination as well as some mild blood in the urine.  The curl in the kidney can cause some mild flank discomfort.  This may be more noticeable when you urinate.  A URETERAL STENT is meant to be left in temporarily.  It must be removed or changed no later than 3 months after it's insertion.  If it's not removed it can result in stone overgrowth on the stent that can cause pain, infection, and can be very difficult to remove.      Discharge Medications/instructions:   - Flomax (tamsulosin) to be taken daily until stent is removed  - Oxybutynin 5mg XL (Ditropan XL) to be taken daily until ureteral stent is removed  - Take Tylenol 1000mg every 6 hours for pain  - Take Ibuprofen 600mg every 6 hours as needed for additional pain control  - Take Oxycodone 5mg every 4-6 hours only for break through pain  - Take Colace while taking Oxycodone to prevent constipation      Questions/concerns------------------------  [facility]: [phone]  [facility]: [phone]    Future appointments  You will be contacted to schedule follow up Ureteroscopy in 2-4 weeks.      Dylan Galaviz MD       **If you have questions or concerns about your procedure,   call Dr. Galaviz at [phone]**

## 2022-07-01 NOTE — OR NURSING
Blue cath removed per orders- voids 200 ml red urine- Pt dressed, up in recliner and transported to Phase 2.

## 2022-07-01 NOTE — ANESTHESIA CARE TRANSFER NOTE
Patient: Ivan Bell    Procedure: Procedure(s):  CYSTOSCOPY, RIGHT RETROGRADE PYELOGRAM, RIGHT URETEROSCOPY WITH LASER LITHOTRIPSY AND BASKET REMOVAL OF STONE, RIGHT URETERAL STENT PLACEMENT, LEFT RETROGRADE PYELOGRAM, LEFT URETEROSCOPY WITH LASER LITHOTRIPSY AND BASKET REMOVAL OF STONE, LEFT URETERAL STENT PLACEMENT       Diagnosis: Right kidney stone [N20.0]  Left renal stone [N20.0]  Alcoholic cirrhosis of liver with ascites (H) [K70.31]  Elevated INR [R79.1]  Diagnosis Additional Information: No value filed.    Anesthesia Type:   General     Note:    Oropharynx: oropharynx clear of all foreign objects and spontaneously breathing  Level of Consciousness: awake  Oxygen Supplementation: face mask  Level of Supplemental Oxygen (L/min / FiO2): 6  Independent Airway: airway patency satisfactory and stable  Dentition: dentition unchanged  Vital Signs Stable: post-procedure vital signs reviewed and stable  Report to RN Given: handoff report given  Patient transferred to: PACU  Comments: At end of procedure, spontaneous respirations, adequate tidal volumes, LMA removed atraumatically, airway patent after LMA removal. Oxygen via facemask at 6 liters per minute to PACU. Oxygen tubing connected to wall O2 in PACU, SpO2, NiBP, and EKG monitors and alarms on and functioning, Jocelyn Hugger warmer connected to patient gown, report on patient's clinical status given to PACU RN, RN questions answered.      Handoff Report: Identifed the Patient, Identified the Reponsible Provider, Reviewed the pertinent medical history, Discussed the surgical course, Reviewed Intra-OP anesthesia mangement and issues during anesthesia, Set expectations for post-procedure period and Allowed opportunity for questions and acknowledgement of understanding      Vitals:  Vitals Value Taken Time   BP     Temp     Pulse 69 07/01/22 1455   Resp 17 07/01/22 1455   SpO2 100 % 07/01/22 1455   Vitals shown include unvalidated device data.    Electronically  Signed By: GILLIAN Moreno CRNA  July 1, 2022  2:56 PM

## 2022-07-01 NOTE — OR NURSING
Patient brought a picture of home covid antigen test on phone as directed.  I personally saw this result and it was time stamped 6/30/2022.  Result was negative.

## 2022-07-01 NOTE — INTERVAL H&P NOTE
I have reviewed the surgical (or preoperative) H&P that is linked to this encounter, and examined the patient. There are no significant changes    Clinical Conditions Present on Arrival:  Clinically Significant Risk Factors Present on Admission           # Hypernatremia: Na = N/A on admission, will monitor as appropriate       # Coagulation Defect: INR = N/A and/or PTT = N/A on admission, will monitor for bleeding  # Thrombocytopenia: Plts = N/A on admission, will monitor for bleeding

## 2022-07-01 NOTE — OP NOTE
OPERATIVE REPORT  DATE OF SURGERY: 07/01/22  LOCATION OF SURGERY: [facility] OR  PREOPERATIVE DIAGNOSIS:  (N20.0) Right kidney stone  (primary encounter diagnosis)  (N20.0) Calculus of left kidney  POSTOPERATIVE DIAGNOSIS: (N20.0) Right kidney stone  (primary encounter diagnosis)  (N20.0) Calculus of left kidney     START TIME: 12:53 PM  END TIME: 2:38 PM    PROCEDURE PERFORMED:   1. Cystoscopy  2. RIGHT retrograde pyelogram  3. RIGHT ureteroscopy with laser lithotripsy  4. RIGHT ureteroscopy with basketing of stones   5. RIGHT JJ stent placement  6. LEFT Retrograde Pyelogram   7. LEFT Ureteroscopy with laser lithotripsy - Modifier 22  8.  LEFT ureteral stent placement  9. >1hr physician fluoroscopy time      STAFF SURGEON: Dylan Galaviz MD  ANESTHESIA: General.   ESTIMATED BLOOD LOSS: 10 mL.   DRAINS AND TUBES: bilateral 6fr x24cm ureteral stent, 18fr coude catheter  COMPLICATIONS: None.   DISPOSITION: PACU.   SPECIMENS OBTAINED:   ID Type Source Tests Collected by Time Destination   A : RIGHT KIDNEY STONE Calculus/Stone Kidney, Right STONE ANALYSIS Dylan Galaviz MD 7/1/2022  1:36 PM      SIGNIFICANT FINDINGS: Cystoscopy with no evidence of stones in the bladder.  Right retrograde pyelogram with no evidence of stones in the ureter.  Right ureteroscopy with numerous stones requiring laser lithotripsy and basket removal of all significant stone fragments.  Left ureteroscopy with significant stone burden and 1 time spent using laser lithotripsy to fragment stones however stone burden significant and require staged ureteroscopy which we discussed preoperatively.    MODIFER 22 JUSTIFICATION: He had significant stone burden bilaterally, however on the left side there were 6-8 large stones each measuring about 1 cm making the stone burden extremely large.  This required significant time and effort for laser lithotripsy with several of the stones and difficult to access calyces.     HISTORY OF PRESENT ILLNESS: Ivan  Ray is a 56 year old man with significant medical history including alcoholic cirrhosis status post recent TI PS and paracentesis with a baseline elevated INR.  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  He is being considered for a liver transplant.  He was noted to have significant bilateral kidney stones and he was counseled on treatment options.  We discussed that based on his stone burden I would have recommended PCNL, however given his baseline elevation of INR due to his liver dysfunction this would not be advisable.  We discussed the need to proceed with staged bilateral ureteroscopy and he presents today for first stage.    OPERATION PERFORMED:   Informed consent was obtained and the patient was brought to the operating room where general anesthesia was induced. The patient was given appropriate preoperative antibiotics and positioned supine. The patient was then repositioned in dorsal lithotomy with all pressure points padded. We then performed a timeout, verifying the correct patient's site and procedure to be performed.    22 Anguillan cystoscope was inserted atraumatically into the bladder.  Cystoscopy was performed with no evidence of stones in the bladder.  The right ureteral orifice was cannulated with a 0.035 sensor wire which was advanced into the renal pelvis under fluoroscopic guidance and the cystoscope was removed.  A semirigid ureteroscope was advanced atraumatically into the bladder.  Using an Amplatz superstiff wire the ureteral orifice was cannulated and the semirigid ureteroscope was advanced up to the mid ureter using a railroad technique.  A gentle retrograde pyelogram was performed with no evidence of stones in the ureter.  The Super Stiff wire was advanced into the renal pelvis and the semirigid ureteroscope was removed.  A 12-14 x 46 cm ureteral access sheath was advanced over the Super Stiff wire up to the level of the proximal ureter/UPJ and the inner stylette and wire removed.  Flexible  ureteroscopy was then performed with 6 to 7 stones identified.  These were fragmented with holmium laser lithotripsy and all visible fragments greater than 2 mm were basketed and removed.  This was fairly challenging given the friable mucosa and elevated INR making visualization at times challenging.  The ureteroscope and access sheath were removed en bloc with no evidence of ureteral injury.  The cystoscope was replaced in the bladder and a 6 Albanian by 24 cm JJ ureteral stent was advanced over the wire with good curl noted in the renal pelvis fluoroscopically and in the bladder on direct vision.  Attention was turned to the left side.  The left ureteral orifice was cannulated with a 0.035 sensor wire which was advanced to the renal pelvis under fluoroscopic guidance and the cystoscope was removed.  The semirigid ureteroscope was advanced into the bladder and the Amplatz Super Stiff wire was used to cannulate the ureteral orifice and the semirigid ureteroscope was advanced using a railroad technique up to the mid ureter.  A gentle retrograde pyelogram was performed with no evidence of stones in the ureter.  The Super Stiff wire was advanced up to the renal pelvis and the semirigid ureteroscope was removed.  The 12-14 x 46 cm ureteral access sheath was advanced up the proximal ureter/UPJ and the inner stylette and wire were removed.  Flexible ureteroscope was then advanced up to the kidney and numerous large 1 cm stones or greater were identified throughout the mid and lower poles.  Significant time was spent using holmium laser lithotripsy to fragment the stones, however several of the stones were very challenging calyces and this took considerable surgeon time and effort -modifier 22.  Given the duration of the surgery and the friable mucosa due to his elevated INR, decision made to focus today on laser lithotripsy of the left stones and will return for additional laser lithotripsy and basket removal of fragments  at the next stage ureteroscopy.  The ureteroscope and access sheath were removed with no evidence of ureteral injury.  The cystoscope was replaced in the bladder and a 6 Japanese by 24 cm JJ ureteral stent was advanced over the wire with good curl noted in the renal pelvis fluoroscopically and in the bladder on direct vision.  The cystoscope was removed.  An 18 Japanese coudé catheter was placed with 10 cc in the balloon.  He received 20 mg IV Lasix.  He was emerged anesthesia and taken to the PACU in stable condition.      Dylan Galaviz MD   Urology  [facility]  Clinic Phone [phone]

## 2022-07-02 NOTE — ED PROVIDER NOTES
History     Chief Complaint   Patient presents with     Drainage from Incision     parcentesis site     HPI  Ivan Bell is a 52 year old male who has past medical history significant for cirrhosis, with ascites, and paracentesis that was performed on October 30, patient subsequently had repeat presented with upper GI bleed, requiring banding.  I am familiar with the patient from previous emergency department visit.  I had performed a paracentesis on October 30.  He subsequently required figure of 8 stitch to stop persistent leakage.  Patient had been hospitalized for 5 days, and recently discharged.  He had Dermabond which was applied prior to discharge.  During bandaged changed this evening he started having leakage from the paracentesis site.  No pains currently.  No other complaints.    Problem List:    Patient Active Problem List    Diagnosis Date Noted     GI bleed 10/30/2017     Priority: Medium     Decompensated hepatic cirrhosis (H) 10/30/2017     Priority: Medium     Alcoholic cirrhosis of liver with ascites (H) 03/08/2016     Priority: Medium     Hallucinations, visual 02/28/2016     Priority: Medium     Peroneal muscular atrophy 01/02/2014     Priority: Medium     January 2, 2014 right leg, EMG done.        Hypertension goal BP (blood pressure) < 140/90 12/18/2012     Priority: Medium     24 hour contact given to patient  01/02/2012     Priority: Medium     EMERGENCY CARE PLAN  Presenting Problem Signs and Symptoms Treatment Plan    Questions or conerns during clinic hours    I will call the clinic directly     Questions or conerns outside clinic hours    I will call the 24 hour nurse line at [phone]    Patient needs to schedule an appointment    I will call the 24 hour scheduling team at [phone] or clinic directly    Same day treatment     I will call the clinic first, nurse line if after hours, urgent care and express care if needed                                 CARDIOVASCULAR  SCREENING; LDL GOAL LESS THAN 130 10/31/2010     Priority: Medium     Injury, hand 11/21/2008     Priority: Medium     January 2, 2009: Tendon injury 2nd  extensor tendon.  Crush injury with residual significant deficit.  Ortho Hand consult       Erectile dysfunction 01/29/2008     Priority: Medium     Gouty arthropathy 12/31/2006     Priority: Medium     Problem list name updated by automated process. Provider to review and confirm  Imo Update utility       Hypertriglyceridemia 08/03/2005     Priority: Medium     Last Exam: December 19, 2007  Last Lipids: CHOL      211   01/25/2007 LDL      104   01/25/2007 HDL       83   01/25/2007  Last LFTs:: AST      106   01/25/2007   ALT       53   01/25/2007    TRIG      120   01/25/2007  TRIG      115   01/16/2006  Meds: Lopid 600 bid - tolerating          Past Medical History:    Past Medical History:   Diagnosis Date     Ascites      Cirrhosis (H)      Esophageal varices (H)      Hepatitis      Hypertension      Left calcaneus fracture 1/9/2006     Portal vein thrombosis        Past Surgical History:    Past Surgical History:   Procedure Laterality Date     ANKLE SURGERY Left      COLONOSCOPY N/A 3/31/2016    Procedure: COLONOSCOPY;  Surgeon: Rhys Uriostegui MD;  Location:  GI     ESOPHAGOSCOPY, GASTROSCOPY, DUODENOSCOPY (EGD), COMBINED N/A 3/31/2016    Procedure: COMBINED ESOPHAGOSCOPY, GASTROSCOPY, DUODENOSCOPY (EGD);  Surgeon: Rhys Uriostegui MD;  Location:  GI     KNEE SURGERY Left      KNEE SURGERY Right      SIGMOIDOSCOPY FLEXIBLE N/A 10/31/2017    Procedure: SIGMOIDOSCOPY FLEXIBLE;;  Surgeon: Armaan Adams MD;  Location:  GI       Family History:    Family History   Problem Relation Age of Onset     Family History Negative Mother      Family History Negative Father      Hypertension Father      CEREBROVASCULAR DISEASE Father 87     Breast Cancer Maternal Grandmother      Rheumatoid Arthritis Daughter      Depression Daughter       Cancer - colorectal No family hx of      Prostate Cancer No family hx of      Liver Disease No family hx of        Social History:  Marital Status:   [2]  Social History   Substance Use Topics     Smoking status: Former Smoker     Types: Dip, chew, snus or snuff     Smokeless tobacco: Former User     Quit date: 10/24/2017      Comment: 1 tin per 10 days.     Alcohol use No      Comment: last etoh 2/14/16, did have Odouls ~8/2017        Medications:      cefTRIAXone (ROCEPHIN) 2 GM vial   furosemide (LASIX) 20 MG tablet   spironolactone (ALDACTONE) 100 MG tablet   ciprofloxacin (CIPRO) 500 MG tablet   pantoprazole (PROTONIX) 40 MG EC tablet   SILDENAFIL CITRATE PO   Pyridoxine HCl (VITAMIN B6 PO)   lactulose (CHRONULAC) 10 GM/15ML solution   Acetaminophen (TYLENOL PO)   MULTIPLE VITAMINS PO         Review of Systems   Constitutional: Negative for fever.   Respiratory: Negative for shortness of breath.    Cardiovascular: Negative for chest pain.   Gastrointestinal: Negative for abdominal pain.        See HPI.     All other systems reviewed and are negative.      Physical Exam   BP: 117/66  Pulse: 87  Temp: 97.8  F (36.6  C)  Resp: 18  Weight: 71.7 kg (158 lb 1.1 oz)  SpO2: 100 %      Physical Exam  /78  Pulse 87  Temp 97.8  F (36.6  C) (Oral)  Resp 18  Wt 71.7 kg (158 lb 1.1 oz)  SpO2 100%  BMI 22.68 kg/m2  General: alert and in no acute distress  Head: atraumatic, normocephalic  Abd: Soft, distended.  Leakage of ascitic fluid from left sided paracentesis site.    Musculoskel/Extremities: normal extremities, no edema, erythema, tenderness and full AROM of major joints without tenderness  Skin: no rashes, no diaphoresis and skin color normal  Neuro: Patient awake, alert, oriented, speech is fluent, gait is normal  Psychiatric: affect/mood normal, cooperative, normal judgement/insight and memory intact      ED Course     ED Course     Procedures               Critical Care time:  none                Labs Ordered and Resulted from Time of ED Arrival Up to the Time of Departure from the ED - No data to display    Assessments & Plan (with Medical Decision Making)  52 year old male , presenting to the emergency Department with complaints of drainage from the left-sided paracentesis site that began at approximately 11 PM after the dressing had been changed at approximately 7 PM.  Patient does have distended abdomen.  He does have persistent leakage from the left-sided paracentesis site.  There had been a figure-of-eight stitch which was placed on October 30.  This was removed a few days ago.  Initially, attempted to place Dermabond, however had persistent drainage, and therefore repeat figure-of-eight stitch was placed.  Dermabond was applied over this.  He had no further drainage.  Patient has follow-up on Wednesday, in 2 days, and can have assessment at that point.  Otherwise instructed to have stitches removed in approximately 7-10 days.       I have reviewed the nursing notes.    I have reviewed the findings, diagnosis, plan and need for follow up with the patient.       Discharge Medication List as of 11/6/2017  4:19 AM          Final diagnoses:   Drainage from wound   Alcoholic cirrhosis of liver with ascites (H)       11/6/2017   [facility] EMERGENCY DEPARTMENT     Shahid Miller MD  11/06/17 [id number]     show

## 2022-07-05 ENCOUNTER — PREP FOR PROCEDURE (OUTPATIENT)
Dept: UROLOGY | Facility: CLINIC | Age: 57
End: 2022-07-05

## 2022-07-05 DIAGNOSIS — N20.0 RIGHT KIDNEY STONE: Primary | ICD-10-CM

## 2022-07-05 DIAGNOSIS — N20.0 LEFT RENAL STONE: ICD-10-CM

## 2022-07-06 ENCOUNTER — TELEPHONE (OUTPATIENT)
Dept: UROLOGY | Facility: CLINIC | Age: 57
End: 2022-07-06

## 2022-07-06 LAB
APPEARANCE STONE: NORMAL
COMPN STONE: NORMAL
SPECIMEN WT: 230 MG

## 2022-07-06 NOTE — TELEPHONE ENCOUNTER
[facility] Call Center    Phone Message    May a detailed message be left on voicemail: yes     Reason for Call: Symptoms or Concerns     If patient has red-flag symptoms, warm transfer to triage line    Current symptom or concern: still having some bleeding since 7/1 surgery.    Symptoms have been present for:  5 day(s)    Has patient previously been seen for this? No    By Dr. Galaviz    Date: 7/1/22    Are there any new or worsening symptoms? No      Action Taken: Message routed to:  Adult Clinics: Urology p [id number]    Travel Screening: Not Applicable

## 2022-07-06 NOTE — TELEPHONE ENCOUNTER
Returned call to patient. Patient stated he is having a lot of blood and clotting. Denies fever or chills and pain is a 3/10. Encouraged patient to push fluids and informed him a message will be sent to Dr. Galaviz. Patient stated he was comfortable with this plan. Also assisted patient in cancelling stent removal for tomorrow and informed him the [place] surgery scheduler will be reaching out.    Holli Sarmiento LPN on 7/6/2022 at 8:50 AM

## 2022-07-07 NOTE — TELEPHONE ENCOUNTER
Per Dr. Galaviz, he agrees with pushing fluids. Patient is aware.    Holli Sarmiento LPN on 7/7/2022 at 9:58 AM

## 2022-07-11 ENCOUNTER — TELEPHONE (OUTPATIENT)
Dept: UROLOGY | Facility: CLINIC | Age: 57
End: 2022-07-11

## 2022-07-11 ENCOUNTER — NURSE TRIAGE (OUTPATIENT)
Dept: NURSING | Facility: CLINIC | Age: 57
End: 2022-07-11

## 2022-07-11 NOTE — TELEPHONE ENCOUNTER
Attempted to reach pt.  Left detailed message to call clinic back at [phone].     Tita Floyd RN MSN    See other phone note.

## 2022-07-11 NOTE — TELEPHONE ENCOUNTER
Nurse Triage SBAR    Is this a 2nd Level Triage?    Yes    Situation:    Blood in urine    Background/Assessment    On 7/27/2022  CYSTOSCOPY, BILATERAL URETERAL STENT REMOVAL, BILATERAL  RETROGRADE PYELOGRAM, BILATERAL URETEROSCOPY WITH LASER LITHOTRIPSY AND BASKET REMOVAL OF STONE, POSSIBLE BILATERAL URETERAL STENT PLACEMENT    Pt reporting, having a lot of blood in his urine after the procedure.       No pain, or fever symptoms noted.    But wondering how much blood is to much to have in a person urine after this procedure.    Pt would like a call back from the clinic.    Please call Pt back @ [phone] for further assistance.    Ilsa Steward RN  Central Triage Red Flags/Med Refills      Protocol Recommended Disposition:   See Today Or Tomorrow In Office      Reason for Disposition    All other patients with blood in urine (Exception: could be normal menstrual bleeding)    Additional Information    Negative: Shock suspected (e.g., cold/pale/clammy skin, too weak to stand, low BP, rapid pulse)    Negative: Sounds like a life-threatening emergency to the triager    Negative: Urinary catheter, questions about    Negative: Recent back or abdominal injury    Negative: Recent genital injury    Negative: Unable to urinate (or only a few drops) > 4 hours and bladder feels very full (e.g., palpable bladder or strong urge to urinate)    Negative: Passing pure blood or large blood clots (i.e., larger than a dime or grape)    Negative: Fever > 100.4 F (38.0 C)    Negative: Patient sounds very sick or weak to the triager    Negative: Known sickle cell disease    Negative: Taking Coumadin (warfarin) or other strong blood thinner, or known bleeding disorder (e.g., thrombocytopenia)    Negative: Side (flank) or back pain present    Negative: Pain or burning with passing urine (urination)    Negative: Patient wants to be seen    Negative: Pink or red-colored urine and likely from food (beets, rhubarb, red food dye) and lasts  > 24 hours after stopping food    Protocols used: URINE - BLOOD IN-A-OH

## 2022-07-11 NOTE — TELEPHONE ENCOUNTER
The patient called again stating he is still bleeding and would like to speak with someone. Please advise. Thank you.

## 2022-07-11 NOTE — TELEPHONE ENCOUNTER
Attempted to reach pt.  Left detailed message to call clinic back at [phone].     Tita Floyd RN MSN

## 2022-07-11 NOTE — TELEPHONE ENCOUNTER
Attempted to reach pt.  Left detailed message to call clinic back at [phone].   To discuss symptoms.     Tita Floyd RN MSN

## 2022-07-12 NOTE — TELEPHONE ENCOUNTER
Dylan Galaviz MD Bratsch, Angie J RN  Caller: Unspecified (Yesterday, 12:14 PM)  Damian Rivers,     Okay, I would prefer that we continue to provide reassurance and support and follow-up with him in August as scheduled, however if you would like to come in for an earlier visit next week we could make that work.     Thanks,   Dylan Galaviz M.D.     Spoke to pt. Provided pt reassurance and confirmed that he is ok to continue doing what he is doing and monitor at home. Reviewed urgent symptoms and he verbalized understanding. Will call clinic back as needed.     Tita Floyd, RN MSN

## 2022-07-12 NOTE — TELEPHONE ENCOUNTER
Spoke to pt. Pt reports that blood in urine has remained the same since surgery. Pt reports that he's been drinking a lot of water. About 7-8 bottles of water daily at work. No change in bleeding noted with increased fluids. Does have some burning with urination intermittently, rated up to 8/10. No clots noted in urine. No fever. No weakness noted. No difficulty noted otherwise with urination. Last BM this morning, normal. Able to eat and drink ok. No nausea of vomiting. Confirmed that pt has been taking flomax and oxybutynin daily. Chart and problems list reviewed.    Tita Floyd, DILIA MSN

## 2022-07-19 NOTE — TELEPHONE ENCOUNTER
The Intake Pod has attempted to reach patient via phone x 3, with the numbers provided in chart. Trying to reach you letter also mailed to patient over seven days ago, with no response from patient. Per work flow process, patient is being removed from the intake pods reporting work bench. The assigned RN coordinator for this patient will be notified that Intake Pod was unable to reach patient to initiate referral process.

## 2022-07-21 ENCOUNTER — TELEPHONE (OUTPATIENT)
Dept: BEHAVIORAL HEALTH | Facility: CLINIC | Age: 57
End: 2022-07-21

## 2022-07-26 ENCOUNTER — ANESTHESIA EVENT (OUTPATIENT)
Dept: SURGERY | Facility: CLINIC | Age: 57
End: 2022-07-26
Payer: COMMERCIAL

## 2022-07-27 ENCOUNTER — TELEPHONE (OUTPATIENT)
Dept: UROLOGY | Facility: CLINIC | Age: 57
End: 2022-07-27

## 2022-07-27 ENCOUNTER — ANESTHESIA (OUTPATIENT)
Dept: SURGERY | Facility: CLINIC | Age: 57
End: 2022-07-27
Payer: COMMERCIAL

## 2022-07-27 ENCOUNTER — APPOINTMENT (OUTPATIENT)
Dept: GENERAL RADIOLOGY | Facility: CLINIC | Age: 57
End: 2022-07-27
Attending: UROLOGY
Payer: COMMERCIAL

## 2022-07-27 ENCOUNTER — HOSPITAL ENCOUNTER (OUTPATIENT)
Facility: CLINIC | Age: 57
Discharge: HOME | End: 2022-07-27
Attending: UROLOGY | Admitting: UROLOGY
Payer: COMMERCIAL

## 2022-07-27 VITALS
OXYGEN SATURATION: 99 % | SYSTOLIC BLOOD PRESSURE: 147 MMHG | HEART RATE: 73 BPM | DIASTOLIC BLOOD PRESSURE: 69 MMHG | TEMPERATURE: 97.6 F | WEIGHT: 172.8 LBS | BODY MASS INDEX: 24.19 KG/M2 | HEIGHT: 71 IN | RESPIRATION RATE: 15 BRPM

## 2022-07-27 DIAGNOSIS — N20.0 CALCULUS OF LEFT KIDNEY: ICD-10-CM

## 2022-07-27 DIAGNOSIS — N20.1 RIGHT URETERAL STONE: Primary | ICD-10-CM

## 2022-07-27 PROCEDURE — C1758 CATHETER, URETERAL: HCPCS | Performed by: UROLOGY

## 2022-07-27 PROCEDURE — 250N000009 HC RX 250: Performed by: UROLOGY

## 2022-07-27 PROCEDURE — 272N000001 HC OR GENERAL SUPPLY STERILE: Performed by: UROLOGY

## 2022-07-27 PROCEDURE — 258N000001 HC RX 258: Performed by: UROLOGY

## 2022-07-27 PROCEDURE — C2617 STENT, NON-COR, TEM W/O DEL: HCPCS | Performed by: UROLOGY

## 2022-07-27 PROCEDURE — 250N000009 HC RX 250: Performed by: NURSE ANESTHETIST, CERTIFIED REGISTERED

## 2022-07-27 PROCEDURE — 710N000009 HC RECOVERY PHASE 1, LEVEL 1, PER MIN: Performed by: UROLOGY

## 2022-07-27 PROCEDURE — 258N000003 HC RX IP 258 OP 636: Performed by: ANESTHESIOLOGY

## 2022-07-27 PROCEDURE — C1894 INTRO/SHEATH, NON-LASER: HCPCS | Performed by: UROLOGY

## 2022-07-27 PROCEDURE — 999N000179 XR SURGERY CARM FLUORO LESS THAN 5 MIN W STILLS: Mod: TC

## 2022-07-27 PROCEDURE — 82365 CALCULUS SPECTROSCOPY: CPT | Performed by: UROLOGY

## 2022-07-27 PROCEDURE — 52352 CYSTOURETERO W/STONE REMOVE: CPT | Mod: 59 | Performed by: UROLOGY

## 2022-07-27 PROCEDURE — 710N000012 HC RECOVERY PHASE 2, PER MINUTE: Performed by: UROLOGY

## 2022-07-27 PROCEDURE — 52356 CYSTO/URETERO W/LITHOTRIPSY: CPT | Mod: 22 | Performed by: UROLOGY

## 2022-07-27 PROCEDURE — 999N000141 HC STATISTIC PRE-PROCEDURE NURSING ASSESSMENT: Performed by: UROLOGY

## 2022-07-27 PROCEDURE — 250N000011 HC RX IP 250 OP 636: Performed by: UROLOGY

## 2022-07-27 PROCEDURE — C1769 GUIDE WIRE: HCPCS | Performed by: UROLOGY

## 2022-07-27 PROCEDURE — 250N000011 HC RX IP 250 OP 636: Performed by: NURSE ANESTHETIST, CERTIFIED REGISTERED

## 2022-07-27 PROCEDURE — 360N000076 HC SURGERY LEVEL 3, PER MIN: Performed by: UROLOGY

## 2022-07-27 PROCEDURE — 250N000025 HC SEVOFLURANE, PER MIN: Performed by: UROLOGY

## 2022-07-27 PROCEDURE — 250N000011 HC RX IP 250 OP 636: Performed by: ANESTHESIOLOGY

## 2022-07-27 PROCEDURE — 74420 UROGRAPHY RTRGR +-KUB: CPT | Mod: 26 | Performed by: UROLOGY

## 2022-07-27 PROCEDURE — 258N000003 HC RX IP 258 OP 636: Performed by: NURSE ANESTHETIST, CERTIFIED REGISTERED

## 2022-07-27 PROCEDURE — 370N000017 HC ANESTHESIA TECHNICAL FEE, PER MIN: Performed by: UROLOGY

## 2022-07-27 DEVICE — URETERAL STENT
Type: IMPLANTABLE DEVICE | Site: URETER | Status: FUNCTIONAL
Brand: POLARIS™ ULTRA

## 2022-07-27 RX ORDER — FENTANYL CITRATE 50 UG/ML
25 INJECTION, SOLUTION INTRAMUSCULAR; INTRAVENOUS
Status: DISCONTINUED | OUTPATIENT
Start: 2022-07-27 | End: 2022-07-27 | Stop reason: HOSPADM

## 2022-07-27 RX ORDER — CEFAZOLIN SODIUM/WATER 2 G/20 ML
2 SYRINGE (ML) INTRAVENOUS
Status: COMPLETED | OUTPATIENT
Start: 2022-07-27 | End: 2022-07-27

## 2022-07-27 RX ORDER — LIDOCAINE HYDROCHLORIDE 20 MG/ML
INJECTION, SOLUTION INFILTRATION; PERINEURAL PRN
Status: DISCONTINUED | OUTPATIENT
Start: 2022-07-27 | End: 2022-07-27

## 2022-07-27 RX ORDER — EPHEDRINE SULFATE 50 MG/ML
INJECTION, SOLUTION INTRAMUSCULAR; INTRAVENOUS; SUBCUTANEOUS PRN
Status: DISCONTINUED | OUTPATIENT
Start: 2022-07-27 | End: 2022-07-27

## 2022-07-27 RX ORDER — PROPOFOL 10 MG/ML
INJECTION, EMULSION INTRAVENOUS CONTINUOUS PRN
Status: DISCONTINUED | OUTPATIENT
Start: 2022-07-27 | End: 2022-07-27

## 2022-07-27 RX ORDER — ONDANSETRON 2 MG/ML
4 INJECTION INTRAMUSCULAR; INTRAVENOUS EVERY 30 MIN PRN
Status: DISCONTINUED | OUTPATIENT
Start: 2022-07-27 | End: 2022-07-27 | Stop reason: HOSPADM

## 2022-07-27 RX ORDER — CEFAZOLIN SODIUM/WATER 2 G/20 ML
2 SYRINGE (ML) INTRAVENOUS SEE ADMIN INSTRUCTIONS
Status: DISCONTINUED | OUTPATIENT
Start: 2022-07-27 | End: 2022-07-27 | Stop reason: HOSPADM

## 2022-07-27 RX ORDER — FENTANYL CITRATE 0.05 MG/ML
50 INJECTION, SOLUTION INTRAMUSCULAR; INTRAVENOUS EVERY 5 MIN PRN
Status: DISCONTINUED | OUTPATIENT
Start: 2022-07-27 | End: 2022-07-27 | Stop reason: HOSPADM

## 2022-07-27 RX ORDER — ASPIRIN 81 MG
100 TABLET, DELAYED RELEASE (ENTERIC COATED) ORAL DAILY
Qty: 30 TABLET | Refills: 0 | Status: SHIPPED | OUTPATIENT
Start: 2022-07-27 | End: 2022-08-26

## 2022-07-27 RX ORDER — SODIUM CHLORIDE, SODIUM LACTATE, POTASSIUM CHLORIDE, CALCIUM CHLORIDE 600; 310; 30; 20 MG/100ML; MG/100ML; MG/100ML; MG/100ML
INJECTION, SOLUTION INTRAVENOUS CONTINUOUS
Status: DISCONTINUED | OUTPATIENT
Start: 2022-07-27 | End: 2022-07-27 | Stop reason: HOSPADM

## 2022-07-27 RX ORDER — DEXMEDETOMIDINE HYDROCHLORIDE 4 UG/ML
INJECTION, SOLUTION INTRAVENOUS PRN
Status: DISCONTINUED | OUTPATIENT
Start: 2022-07-27 | End: 2022-07-27

## 2022-07-27 RX ORDER — PROPOFOL 10 MG/ML
INJECTION, EMULSION INTRAVENOUS PRN
Status: DISCONTINUED | OUTPATIENT
Start: 2022-07-27 | End: 2022-07-27

## 2022-07-27 RX ORDER — HYDROMORPHONE HCL IN WATER/PF 6 MG/30 ML
0.2 PATIENT CONTROLLED ANALGESIA SYRINGE INTRAVENOUS EVERY 5 MIN PRN
Status: DISCONTINUED | OUTPATIENT
Start: 2022-07-27 | End: 2022-07-27 | Stop reason: HOSPADM

## 2022-07-27 RX ORDER — ONDANSETRON 4 MG/1
4 TABLET, ORALLY DISINTEGRATING ORAL EVERY 30 MIN PRN
Status: DISCONTINUED | OUTPATIENT
Start: 2022-07-27 | End: 2022-07-27 | Stop reason: HOSPADM

## 2022-07-27 RX ORDER — ONDANSETRON 2 MG/ML
INJECTION INTRAMUSCULAR; INTRAVENOUS PRN
Status: DISCONTINUED | OUTPATIENT
Start: 2022-07-27 | End: 2022-07-27

## 2022-07-27 RX ORDER — FENTANYL CITRATE 50 UG/ML
INJECTION, SOLUTION INTRAMUSCULAR; INTRAVENOUS PRN
Status: DISCONTINUED | OUTPATIENT
Start: 2022-07-27 | End: 2022-07-27

## 2022-07-27 RX ORDER — FUROSEMIDE 10 MG/ML
INJECTION INTRAMUSCULAR; INTRAVENOUS PRN
Status: DISCONTINUED | OUTPATIENT
Start: 2022-07-27 | End: 2022-07-27

## 2022-07-27 RX ORDER — OXYCODONE HYDROCHLORIDE 5 MG/1
5 TABLET ORAL EVERY 4 HOURS PRN
Status: DISCONTINUED | OUTPATIENT
Start: 2022-07-27 | End: 2022-07-27 | Stop reason: HOSPADM

## 2022-07-27 RX ORDER — IOPAMIDOL 612 MG/ML
INJECTION, SOLUTION INTRATHECAL PRN
Status: DISCONTINUED | OUTPATIENT
Start: 2022-07-27 | End: 2022-07-27 | Stop reason: HOSPADM

## 2022-07-27 RX ORDER — MAGNESIUM HYDROXIDE 1200 MG/15ML
LIQUID ORAL PRN
Status: DISCONTINUED | OUTPATIENT
Start: 2022-07-27 | End: 2022-07-27 | Stop reason: HOSPADM

## 2022-07-27 RX ORDER — OXYCODONE HYDROCHLORIDE 5 MG/1
5 TABLET ORAL EVERY 6 HOURS PRN
Qty: 9 TABLET | Refills: 0 | Status: SHIPPED | OUTPATIENT
Start: 2022-07-27 | End: 2022-07-30

## 2022-07-27 RX ADMIN — FENTANYL CITRATE 50 MCG: 50 INJECTION, SOLUTION INTRAMUSCULAR; INTRAVENOUS at 11:45

## 2022-07-27 RX ADMIN — ONDANSETRON 4 MG: 2 INJECTION INTRAMUSCULAR; INTRAVENOUS at 13:36

## 2022-07-27 RX ADMIN — Medication 2 G: at 11:39

## 2022-07-27 RX ADMIN — FENTANYL CITRATE 25 MCG: 50 INJECTION, SOLUTION INTRAMUSCULAR; INTRAVENOUS at 12:01

## 2022-07-27 RX ADMIN — FUROSEMIDE 20 MG: 10 INJECTION, SOLUTION INTRAVENOUS at 13:39

## 2022-07-27 RX ADMIN — PHENYLEPHRINE HYDROCHLORIDE 100 MCG: 10 INJECTION INTRAVENOUS at 13:02

## 2022-07-27 RX ADMIN — Medication 7.5 MG: at 11:54

## 2022-07-27 RX ADMIN — PROPOFOL 30 MCG/KG/MIN: 10 INJECTION, EMULSION INTRAVENOUS at 11:51

## 2022-07-27 RX ADMIN — PROPOFOL 30 MG: 10 INJECTION, EMULSION INTRAVENOUS at 11:52

## 2022-07-27 RX ADMIN — Medication 5 MG: at 12:13

## 2022-07-27 RX ADMIN — PHENYLEPHRINE HYDROCHLORIDE 100 MCG: 10 INJECTION INTRAVENOUS at 13:11

## 2022-07-27 RX ADMIN — PROPOFOL 150 MG: 10 INJECTION, EMULSION INTRAVENOUS at 11:48

## 2022-07-27 RX ADMIN — SODIUM CHLORIDE, POTASSIUM CHLORIDE, SODIUM LACTATE AND CALCIUM CHLORIDE: 600; 310; 30; 20 INJECTION, SOLUTION INTRAVENOUS at 11:39

## 2022-07-27 RX ADMIN — FENTANYL CITRATE 25 MCG: 50 INJECTION, SOLUTION INTRAMUSCULAR; INTRAVENOUS at 11:48

## 2022-07-27 RX ADMIN — DEXMEDETOMIDINE HYDROCHLORIDE 12 MCG: 200 INJECTION INTRAVENOUS at 13:35

## 2022-07-27 RX ADMIN — LIDOCAINE HYDROCHLORIDE 60 MG: 20 INJECTION, SOLUTION INFILTRATION; PERINEURAL at 11:48

## 2022-07-27 RX ADMIN — FENTANYL CITRATE 50 MCG: 50 INJECTION, SOLUTION INTRAMUSCULAR; INTRAVENOUS at 14:20

## 2022-07-27 RX ADMIN — PROPOFOL 50 MG: 10 INJECTION, EMULSION INTRAVENOUS at 11:49

## 2022-07-27 RX ADMIN — FENTANYL CITRATE 25 MCG: 50 INJECTION, SOLUTION INTRAMUSCULAR; INTRAVENOUS at 13:40

## 2022-07-27 RX ADMIN — PHENYLEPHRINE HYDROCHLORIDE 100 MCG: 10 INJECTION INTRAVENOUS at 11:54

## 2022-07-27 ASSESSMENT — LIFESTYLE VARIABLES: TOBACCO_USE: 1

## 2022-07-27 NOTE — ANESTHESIA PREPROCEDURE EVALUATION
Anesthesia Pre-Procedure Evaluation    Patient: Ivan Bell   MRN: [id number] : 1965        Procedure : Procedure(s):  CYSTOSCOPY, BILATERAL URETERAL STENT REMOVAL, BILATERAL  RETROGRADE PYELOGRAM, BILATERAL URETEROSCOPY WITH LASER LITHOTRIPSY AND BASKET REMOVAL OF STONE, POSSIBLE BILATERAL URETERAL STENT PLACEMENT          Past Medical History:   Diagnosis Date     Alcoholic cirrhosis of liver (H)      Alcoholic hepatitis 2019     Chronic kidney disease     CRF     Depression      Gout      History of transfusion      Hypertension      Hypertriglyceridemia      Left calcaneus fracture 2006: Fell 10 feet from ladder onto left foot on frozen ground on 06 at home.  Immediate pain and unable to walk- seen at [place] and diagnosed with calcaneus fracture     Nephrolithiasis      Osteoarthritis      Portal vein thrombosis     left occlusion, partial main     Thrombocytopenia (H)       Past Surgical History:   Procedure Laterality Date     ANKLE SURGERY Left      ANKLE SURGERY       ARTHROSCOPY KNEE       COLONOSCOPY N/A 2016    Procedure: COLONOSCOPY;  Surgeon: Rhys Uriostegui MD;  Location: [facility]     COMBINED CYSTOSCOPY, RETROGRADES, URETEROSCOPY, LASER HOLMIUM LITHOTRIPSY URETER(S), INSERT STENT Bilateral 2022    Procedure: CYSTOSCOPY, RIGHT RETROGRADE PYELOGRAM, RIGHT URETEROSCOPY WITH LASER LITHOTRIPSY AND BASKET REMOVAL OF STONE, RIGHT URETERAL STENT PLACEMENT, LEFT RETROGRADE PYELOGRAM, LEFT URETEROSCOPY WITH LASER LITHOTRIPSY AND BASKET REMOVAL OF STONE, LEFT URETERAL STENT PLACEMENT;  Surgeon: Dylan Galaviz MD;  Location:  OR     ESOPHAGOSCOPY, GASTROSCOPY, DUODENOSCOPY (EGD), COMBINED N/A 2016    Procedure: COMBINED ESOPHAGOSCOPY, GASTROSCOPY, DUODENOSCOPY (EGD);  Surgeon: Rhys Uriostegui MD;  Location: [facility]     ESOPHAGOSCOPY, GASTROSCOPY, DUODENOSCOPY (EGD), COMBINED N/A 2018    Procedure: COMBINED ESOPHAGOSCOPY,  GASTROSCOPY, DUODENOSCOPY (EGD), BIOPSY SINGLE OR MULTIPLE;  EGD;  Surgeon: Gonzalo Wahl MD;  Location:  GI     ESOPHAGOSCOPY, GASTROSCOPY, DUODENOSCOPY (EGD), COMBINED N/A 2019    Procedure: ESOPHAGOGASTRODUODENOSCOPY (EGD);  Surgeon: Gonzalo Wahl MD;  Location:  GI     FOOT SURGERY       IR PARACENTESIS  10/29/2019     IR PARACENTESIS  2020     IR PARACENTESIS  2021     IR PARACENTESIS  2022     IR PARACENTESIS  2022     IR TRANSVEN INTRAHEPATIC PORTOSYST REV  10/29/2019     IR TRANSVEN INTRAHEPATIC PORTOSYST REV  2020     IR TRANSVEN INTRAHEPATIC PORTOSYST REV  2021     IR TRANSVEN INTRAHEPATIC PORTOSYST REV  2022     IR TRANSVEN INTRAHEPATIC PORTOSYST REV  2022     KNEE SURGERY Left      KNEE SURGERY Right      RELEASE CARPAL TUNNEL       RELEASE TRIGGER FINGER Right 2020    Procedure: RELEASE, TRIGGER FINGER, right ring and long finger;  Surgeon: Pascual Valencia MD;  Location: [facility]     SIGMOIDOSCOPY FLEXIBLE N/A 10/31/2017    Procedure: SIGMOIDOSCOPY FLEXIBLE;;  Surgeon: Armaan Adams MD;  Location:  GI     TIPS Procedure  2018     TIPS PROCEDURE  2020     ZZC PLASTY KNEE,MED OR LAT COMPARTMT Right 2021    Procedure: RIGHT UNICOMPARTMENTAL ARTHROPLASTY KNEE MEDIAL;  Surgeon: José Miguel Landaverde MD;  Location: [facility];  Service: Orthopedics      Allergies   Allergen Reactions     Prednisone Visual Disturbance     Trazodone Visual Disturbance     Benadryl [Diphenhydramine] Other (See Comments)     Delirium (visual and auditory hallucinations)      Social History     Tobacco Use     Smoking status: Former Smoker     Packs/day: 0.00     Types: Dip, chew, snus or snuff     Quit date: 1998     Years since quittin.9     Smokeless tobacco: Former User     Types: Chew     Tobacco comment: 1 tin per 10 days.   Substance Use Topics     Alcohol use: No     Alcohol/week: 17.5 standard drinks      Types: 21 Cans of beer per week     Comment: 02/2019      Wt Readings from Last 1 Encounters:   07/27/22 78.4 kg (172 lb 12.8 oz)        Anesthesia Evaluation   Pt has had prior anesthetic.     No history of anesthetic complications       ROS/MED HX  ENT/Pulmonary:     (+) sleep apnea, tobacco use, Past use,     Neurologic:       Cardiovascular:     (+) Dyslipidemia hypertension-----    METS/Exercise Tolerance:     Hematologic:       Musculoskeletal:   (+) arthritis (cervicalgia),     GI/Hepatic: Comment: Hx of portal vein thrombosis  Cirhosis, s/p tips , inr 1.65    (+) hepatitis liver disease,     Renal/Genitourinary:     (+) renal disease, BPH,     Endo:       Psychiatric/Substance Use:     (+) psychiatric history depression alcohol abuse (sober)  (-) chronic opioid use history   Infectious Disease:       Malignancy:       Other:            Physical Exam    Airway        Mallampati: I   TM distance: > 3 FB   Neck ROM: full   Mouth opening: > 3 cm    Respiratory Devices and Support         Dental  no notable dental history         Cardiovascular   cardiovascular exam normal          Pulmonary   pulmonary exam normal                OUTSIDE LABS:  CBC:   Lab Results   Component Value Date    WBC 4.3 06/13/2022    WBC 3.9 (L) 06/07/2022    HGB 12.6 (L) 06/13/2022    HGB 10.0 (L) 06/07/2022    HCT 38.9 (L) 06/13/2022    HCT 31.6 (L) 06/07/2022    PLT 83 (L) 06/13/2022    PLT 78 (L) 06/07/2022     BMP:   Lab Results   Component Value Date     (H) 07/01/2022     (H) 06/13/2022    POTASSIUM 3.8 07/01/2022    POTASSIUM 4.2 06/13/2022    CHLORIDE 115 (H) 07/01/2022    CHLORIDE 110 (H) 06/13/2022    CO2 25 07/01/2022    CO2 29 06/13/2022    BUN 13 07/01/2022    BUN 14 06/13/2022    CR 1.13 07/01/2022    CR 1.34 (H) 06/13/2022    GLC 96 07/01/2022    GLC 91 06/13/2022     COAGS:   Lab Results   Component Value Date    PTT 41 (H) 02/02/2022    INR 1.65 (H) 06/13/2022    FIBR 181 (L) 03/10/2016     POC:   Lab  Results   Component Value Date    BGM 77 06/06/2018     HEPATIC:   Lab Results   Component Value Date    ALBUMIN 2.4 (L) 06/13/2022    PROTTOTAL 6.0 (L) 06/13/2022    ALT 26 06/13/2022    AST 37 06/13/2022    GGT 3411 (H) 01/26/2004    ALKPHOS 178 (H) 06/13/2022    BILITOTAL 2.1 (H) 06/13/2022    LUISITO 62 (H) 06/04/2019     OTHER:   Lab Results   Component Value Date    LACT 1.4 11/01/2019    A1C 5.3 01/13/2008    JULISSA 8.5 07/01/2022    PHOS 2.5 08/30/2019    MAG 1.8 12/31/2019    LIPASE 327 02/02/2022    AMYLASE 66 01/26/2004    TSH 1.22 06/09/2018    CRP <2.9 05/27/2020    SED 9 05/27/2020       Anesthesia Plan    ASA Status:  4      Anesthesia Type: General.     - Airway: LMA              Consents    Anesthesia Plan(s) and associated risks, benefits, and realistic alternatives discussed. Questions answered and patient/representative(s) expressed understanding.    - Discussed:     - Discussed with:  Patient         Postoperative Care    Pain management: IV analgesics, Oral pain medications.   PONV prophylaxis: Ondansetron (or other 5HT-3), Dexamethasone or Solumedrol     Comments:           H&P reviewed: Unable to attach H&P to encounter due to EHR limitations. H&P Update: appropriate H&P reviewed, patient examined. No interval changes since H&P (within 30 days).         Yael Larsen

## 2022-07-27 NOTE — OR NURSING
Notified Dr Galaviz that urine was dark to medium red in color, he stated to still to discontinue kenney and encourage fluids at home.

## 2022-07-27 NOTE — DISCHARGE INSTRUCTIONS
POSTOPERATIVE INSTRUCTIONS    Diagnosis-------------------------------   Bilateral kidney stones    Procedure-------------------------------  Procedure(s) (LRB):  CYSTOSCOPY, BILATERAL URETERAL STENT REMOVAL, BILATERAL  RETROGRADE PYELOGRAM, BILATERAL URETEROSCOPY. HOLMIUM LASER LITHOTRIPSY LEFT SIDE.  AND BASKET REMOVAL OF STONES BILATERAL, LEFT  URETERAL STENT PLACEMENT (Bilateral)      Findings--------------------------------  Right ureteroscopy with evidence of a few stones in the right ureter and no stones in the renal pelvis or kidney.  No evidence of ureteral injury and no ureteral stent placed.  Left ureteroscopy with evidence of numerous stones within the renal pelvis all calyces.  Several stones required laser lithotripsy.  Numerous stone fragments basketed and removed with all significant stone fragments removed which were accessible.  Several calyces with evidence of stones which were deeper in the parenchyma.  Left ureteral stent placed    Home-going instructions-----------------         Activity Limitation:     - No driving or operating heavy machinery while on narcotic pain medication.     FOLLOW THESE INSTRUCTIONS AS INDICATED BELOW:  - Observe operative area for signs of excessive bleeding.  - You may shower.  - Increase fluid intake to promote clear urine.  - Resume usual diet as tolerated    What to expect while recovering-----------  - You may experience some intermittent bleeding that makes your urine pink or cherry colored. This is normal.  - However, if you are unable to urinate, passing large amount of clots, have jennifer blood in your urine, or have a temperature >101 degrees, call the urology nurse on call, or present to your nearest emergency department.  - You are encouraged to walk daily, and have no activity restrictions.   - A URETERAL STENT has been placed that allows urine to flow unobstructed from your kidney into your bladder.  The stent has a curl in the kidney and a curl in the  bladder.  The curl in the bladder can cause some urgency and frequency of urination as well as some mild blood in the urine.  The curl in the kidney can cause some mild flank discomfort.  This may be more noticeable when you urinate.  A URETERAL STENT is meant to be left in temporarily.  It must be removed or changed no later than 3 months after it's insertion.  If it's not removed it can result in stone overgrowth on the stent that can cause pain, infection, and can be very difficult to remove.      Discharge Medications/instructions:   - Flomax (tamsulosin) to be taken daily until stent is removed  - Antibiotics will only be prescribed if needed  - Take Tylenol 1000mg every 6 hours for pain  - Take Ibuprofen 600mg every 6 hours as needed for additional pain control  - Take Oxycodone 5mg every 4-6 hours only for break through pain  - Take Colace while taking Oxycodone to prevent constipation      Questions/concerns------------------------  [facility]: [phone]  [facility]: [phone]    Future appointments  You will  be contacted to schedule follow up for stent removal next week.       Dylan Galaviz MD       Same Day Surgery Discharge Instructions for  Sedation and General Anesthesia     It's not unusual to feel dizzy, light-headed or faint for up to 24 hours after surgery or while taking pain medication.  If you have these symptoms: sit for a few minutes before standing and have someone assist you when you get up to walk or use the bathroom.    You should rest and relax for the next 24 hours. We recommend you make arrangements to have an adult stay with you for at least 24 hours after your discharge.  Avoid hazardous and strenuous activity.    DO NOT DRIVE any vehicle or operate mechanical equipment for 24 hours following the end of your surgery.  Even though you may feel normal, your reactions may be affected by the medication you have received.    Do not drink  alcoholic beverages for 24 hours following surgery.     Slowly progress to your regular diet as you feel able. It's not unusual to feel nauseated and/or vomit after receiving anesthesia.  If you develop these symptoms, drink clear liquids (apple juice, ginger ale, broth, 7-up, etc. ) until you feel better.  If your nausea and vomiting persists for 24 hours, please notify your surgeon.      All narcotic pain medications, along with inactivity and anesthesia, can cause constipation. Drinking plenty of liquids and increasing fiber intake will help.    For any questions of a medical nature, call your surgeon.    Do not make important decisions for 24 hours.    If you had general anesthesia, you may have a sore throat for a couple of days related to the breathing tube used during surgery.  You may use Cepacol lozenges to help with this discomfort.  If it worsens or if you develop a fever, contact your surgeon.     If you feel your pain is not well managed with the pain medications prescribed by your surgeon, please contact your surgeon's office to let them know so they can address your concerns.            **If you have questions or concerns about your procedure,   call Dr. Galaviz at [phone]**

## 2022-07-27 NOTE — OP NOTE
OPERATIVE REPORT  DATE OF SURGERY: 07/27/22  LOCATION OF SURGERY: [facility]  PREOPERATIVE DIAGNOSIS:  (N20.1) Right ureteral stone  (primary encounter diagnosis)  (N20.0) Calculus of left kidney  POSTOPERATIVE DIAGNOSIS: (N20.1) Right ureteral stone  (primary encounter diagnosis)  (N20.0) Calculus of left kidney     START TIME: 12:02 PM  END TIME: 1:41 PM    PROCEDURE PERFORMED:   1. Cystoscopy and RIGHT ureteral stent removal  2. RIGHT retrograde pyelogram  3. RIGHT ureteroscopy with basketing of stones  4. LEFT ureteral stent removal  5. LEFT Ureteroscopy with laser lithotripsy and basketing of stones - Modifier 22  6. LEFT ureteral stent placement   7. >1hr physician fluoroscopy time      STAFF SURGEON: Dylan Galaviz MD  ANESTHESIA: General.   ESTIMATED BLOOD LOSS: 20 mL.   DRAINS AND TUBES: LEFT 6fr x24cm ureteral stent, 18fr coude catheter  COMPLICATIONS: None.   DISPOSITION: PACU.   SPECIMENS OBTAINED:   ID Type Source Tests Collected by Time Destination   A : RIGHT URETERAL STONES Calculus/Stone Ureter, Right STONE ANALYSIS Dylan Galaviz MD 7/27/2022 12:18 PM    B : LEFT KIDNEY STONES Calculus/Stone Kidney, Left STONE ANALYSIS Dylan Galaviz MD 7/27/2022  1:38 PM      SIGNIFICANT FINDINGS: Right ureteroscopy with evidence of a few stones in the right ureter and no stones in the renal pelvis or kidney.  No evidence of ureteral injury and no ureteral stent placed.  Left ureteroscopy with evidence of numerous stones within the renal pelvis all calyces.  Several stones required laser lithotripsy.  Numerous stone fragments basketed and removed with all significant stone fragments removed which were accessible.  Several calyces with evidence of stones which were deeper in the parenchyma.  Left ureteral stent placed    MODIFIER 22 JUSTIFICATION: This was an extremely challenging case with numerous large stones remaining in the left kidney with greater than 50 basket retrieval's and challenging stone  fragmentation requiring significant surgeon time and effort beyond a standard ureteroscopy.    HISTORY OF PRESENT ILLNESS: Ivan Bell is a 56 year old man with significant medical history including alcohol cirrhosis status post TI PS and paracentesis with baseline elevated INR.  Now sober for the last 4 years.  He is being considered for liver transplant.  He was noted to have significant bilateral kidney stones he was counseled on treatment options.  Based on his stone burden I would have recommended a PCNL, however given his baseline elevation of his INR due to his liver dysfunction this would not be possible.  We discussed a staged bilateral ureteroscopy and he presents today for the second stage.  For stage completed on 7/1/2022 with removal of right-sided kidney stone burden and fragmentation of several of the left large stones.    OPERATION PERFORMED:   Informed consent was obtained and the patient was brought to the operating room where general anesthesia was induced. The patient was given appropriate preoperative antibiotics and positioned supine. The patient was then repositioned in dorsal lithotomy with all pressure points padded. We then performed a timeout, verifying the correct patient's site and procedure to be performed.    22 [place] citizen cystoscope was inserted atraumatically into the bladder.  The right ureteral stent was identified and grasped and withdrawn to the urethral meatus.  This was cannulated with a 0.035 sensor wire which was advanced up the renal pelvis under fluoroscopic guidance and the stent was removed.  A 10 [place] citizen dual-lumen catheter was advanced over the wire to the mid ureter and a gentle retrograde pyelogram was performed with no clear evidence of stones in the ureter.  Amplatz Super Stiff wire was advanced up the renal pelvis and the dual-lumen catheter was removed.  A 13-15 x 46 cm ureteral access sheath was advanced over the Super Stiff wire to the proximal ureter and the inner  stylette and wire were removed.  Flexible ureteroscope was advanced up to the renal pelvis and complete pyeloscopy was performed with no evidence of residual stone fragments.  The ureteroscope and access sheath were removed en bloc with 3 small stone fragments in the ureter which were basketed and removed.  There is no evidence of ureteral injury no ureteral stent placed.  Attention was then turned to the left side and the ureteral stent was grasped and withdrawn to the urethral meatus.  This was cannulated with 0.035 sensor wire which was advanced up to the renal pelvis under fluoroscopic guidance and the stent was removed.  A dual-lumen catheter was advanced over the wire to the mid ureter and a gentle retrograde pyelogram was performed with no clear evidence of stones in the ureter.  An Amplatz superstiff wire was advanced into the renal pelvis and the dual-lumen catheter was removed.  The 13-15 x 46 cm ureteral access sheath was advanced up to the renal pelvis/UPJ under fluoroscopic guidance and the inner stylette and wire were removed.  Flexible ureteroscope was advanced up to the renal pelvis where a large stone was encountered in the renal pelvis.  This was fragmented and dusted with holmium laser lithotripsy.  Pyeloscopy demonstrated significant stone burden in the upper, mid, and lower poles and a few of these stones required laser fragmentation.  Halo basket was used to basket all stone fragments greater than 2 mm.  Several of the stones in the mid and lower pole were noted to be deeper within the parenchyma, though they were visible on x-ray.  This was quite challenging with numerous stone fragments and at times challenging visualization given his elevated INR.  The ureteroscope and access sheath were then removed en bloc with no evidence of ureteral injury.  A new 6 [place] by 24 cm JJ ureteral stent was advanced over the wire with good curl noted in the renal pelvis fluoroscopically and in the bladder on  direct vision.  An 18 Malay coudé catheter was placed.  He received 10 mg IV Lasix.  He was emerged anesthesia and taken to the PACU in stable condition.      Dylan Galaviz MD   Urology  [facility]  Clinic Phone [phone]

## 2022-07-27 NOTE — ANESTHESIA PROCEDURE NOTES
Airway       Patient location during procedure: OR  Staff -        CRNA: Cassy Hi APRN CRNA       Performed By: CRNA  Consent for Airway        Urgency: elective  Indications and Patient Condition       Indications for airway management: tea-procedural         Mask difficulty assessment: 0 - not attempted    Final Airway Details       Final airway type: supraglottic airway    Supraglottic Airway Details        Type: LMA       Brand: Ambu AuraGain       LMA size: 5    Post intubation assessment        Placement verified by: capnometry, equal breath sounds and chest rise        Number of attempts at approach: 1       Secured with: commercial tube parker and silk tape       Ease of procedure: easy       Dentition: Intact

## 2022-07-27 NOTE — ANESTHESIA POSTPROCEDURE EVALUATION
Patient: Ivan Bell    Procedure: Procedure(s):  CYSTOSCOPY, BILATERAL URETERAL STENT REMOVAL, BILATERAL  RETROGRADE PYELOGRAM, BILATERAL URETEROSCOPY. HOLMIUM LASER LITHOTRIPSY LEFT SIDE.  AND BASKET REMOVAL OF STONES BILATERAL, LEFT  URETERAL STENT PLACEMENT       Anesthesia Type:  General    Note:  Disposition: Outpatient   Postop Pain Control: Uneventful            Sign Out: Well controlled pain   PONV: No   Neuro/Psych: Uneventful            Sign Out: Acceptable/Baseline neuro status   Airway/Respiratory: Uneventful            Sign Out: Acceptable/Baseline resp. status   CV/Hemodynamics: Uneventful            Sign Out: Acceptable CV status; No obvious hypovolemia; No obvious fluid overload   Other NRE: NONE   DID A NON-ROUTINE EVENT OCCUR?            Last vitals:  Vitals Value Taken Time   /69 07/27/22 1445   Temp 36.3  C (97.3  F) 07/27/22 1430   Pulse 73 07/27/22 1448   Resp 11 07/27/22 1448   SpO2 96 % 07/27/22 1448   Vitals shown include unvalidated device data.    Electronically Signed By: Yael Larsen  July 27, 2022  2:48 PM

## 2022-07-27 NOTE — ANESTHESIA CARE TRANSFER NOTE
Patient: Ivan Bell    Procedure: Procedure(s):  CYSTOSCOPY, BILATERAL URETERAL STENT REMOVAL, BILATERAL  RETROGRADE PYELOGRAM, BILATERAL URETEROSCOPY. HOLMIUM LASER LITHOTRIPSY LEFT SIDE.  AND BASKET REMOVAL OF STONES BILATERAL, LEFT  URETERAL STENT PLACEMENT       Diagnosis: Right kidney stone [N20.0]  Left renal stone [N20.0]  Diagnosis Additional Information: No value filed.    Anesthesia Type:   General     Note:    Oropharynx: oropharynx clear of all foreign objects and spontaneously breathing  Level of Consciousness: drowsy  Oxygen Supplementation: face mask      Dentition: dentition unchanged  Vital Signs Stable: post-procedure vital signs reviewed and stable  Report to RN Given: handoff report given  Patient transferred to: PACU    Handoff Report: Identifed the Patient, Identified the Reponsible Provider, Reviewed the pertinent medical history, Discussed the surgical course, Reviewed Intra-OP anesthesia mangement and issues during anesthesia, Set expectations for post-procedure period and Allowed opportunity for questions and acknowledgement of understanding      Vitals:  Vitals Value Taken Time   /81 07/27/22 1357   Temp     Pulse 75 07/27/22 1359   Resp 19 07/27/22 1400   SpO2 100 % 07/27/22 1400   Vitals shown include unvalidated device data.    Electronically Signed By: GILLIAN Gore CRNA  July 27, 2022  2:01 PM

## 2022-07-27 NOTE — TELEPHONE ENCOUNTER
Message received from Dr. Galaviz that he would like to see patient back next Thursday for a stent removal.    Attempted to call patient left voicemail for call back.     Offer 8/4/22 at 10:15 am.     Thank you   Betty Acosta LPN on 7/27/2022 at 2:49 PM

## 2022-07-29 LAB
APPEARANCE STONE: NORMAL
COMPN STONE: NORMAL
SPECIMEN WT: 699 MG

## 2022-07-31 LAB
APPEARANCE STONE: NORMAL
COMPN STONE: NORMAL
SPECIMEN WT: 52 MG

## 2022-08-04 ENCOUNTER — OFFICE VISIT (OUTPATIENT)
Dept: UROLOGY | Facility: CLINIC | Age: 57
End: 2022-08-04
Payer: COMMERCIAL

## 2022-08-04 DIAGNOSIS — N20.0 LEFT RENAL STONE: ICD-10-CM

## 2022-08-04 DIAGNOSIS — Z46.6 ENCOUNTER FOR REMOVAL OF URETERAL STENT: ICD-10-CM

## 2022-08-04 DIAGNOSIS — N20.0 RIGHT KIDNEY STONE: Primary | ICD-10-CM

## 2022-08-04 PROCEDURE — 52310 CYSTOSCOPY AND TREATMENT: CPT | Performed by: UROLOGY

## 2022-08-04 RX ORDER — CIPROFLOXACIN 500 MG/1
500 TABLET, FILM COATED ORAL ONCE
Status: COMPLETED | OUTPATIENT
Start: 2022-08-04 | End: 2022-08-04

## 2022-08-04 RX ADMIN — CIPROFLOXACIN 500 MG: 500 TABLET, FILM COATED ORAL at 10:15

## 2022-08-04 NOTE — PATIENT INSTRUCTIONS
"After Your Cystoscopy    What happens after the exam?    You may go back to your normal diet and activity as you feel ready, unless your doctor tells you not to.    For the next two days, you may notice:    Some blood in your urine  Some burning when you urinate (use the toilet)  An urge to urinate more often  Bladder spasms    These are normal after the procedure and should go away after a day or two.  To relieve these problems drink 6 to 8 large glasses of water each day (includes drinks at meals) as this will help clear the urine.  Take warm baths to relieve pain and bladder spasms.  Do not add anything to the bath water.  You may also take Tylenol (acetaminophen) for pain if needed.    When should I call my doctor?    A fever over 100F (38C) for more than a day. (Before you call the doctor, check your temperature under your tongue)  Chills  Failure to urinate: No urine comes out when you try to use the toilet. (Try soaking in a bathtub full of warm water. If still no urine, call your doctor)  A lot of blood in the urine, or blood clots larger than a nickel  Pain in the back or belly area (abdomen)  Pain or spasms that are not relieved by warm tub baths and pain medicine  Severe pain, burning or other problems while passing urine  Pain that gets worse after two days           AFTER YOUR CYSTOSCOPY        You have just completed a cystoscopy, or \"cysto\", which allowed your physician to learn more about your bladder (or to remove a stent placed after surgery). We suggest that you continue to avoid caffeine, fruit juice, and alcohol for the next 24 hours, however, you are encouraged to return to your normal activities.         A few things that are considered normal after your cystoscopy:     * Small amount of bleeding (or spotting) that clears within the next 24 hours     * Slight burning sensation with urination     * Sensation to of needing to avoid more frequently     * The feeling of \"air\" in your urine     * " Mild discomfort that is relieved with Tylenol        Please contact our office promptly if you:     * Develop a fever above 101 degrees     * Are unable to urinate     * Develop bright red blood that does not stop     * Severe pain or swelling         Please contact our office with any concerns or questions @[facility].

## 2022-08-04 NOTE — PROCEDURES
CYSTOSCOPY PROCEDURE NOTE:    Ivan Bell is a 56 year old male  who presents with ureteral stent for cystoscopy and ureteral stent removal.    Pt ID verified with patient: Yes     Procedure verified with patient: Yes     Procedure confirmed with physician and support staff: Yes     Consent form confirmed with physician and support staff.    Sign In  History and Physical Exam reviewed .  Informed Consent Discussed: Yes   Sign in Communication: Yes   Time Out:  Team Confirms the Correct Patient, Correct Procedure; Yes , Correct Site and Site Marking, Correct Position (if applicable).    Affirmation of Time Out: Yes   Sign Out:  Sign Out Discussion: Yes     Ivan Bell is a 56 year old male with an indwelling ureteral stent in need of removal.    CYSTOSCOPY PROCEDURE:  After sterile preparation and draping of the patient,  a 17-Trinidadian flexible cystoscope was introduced via the urethra.  It was passed without difficulty into the bladder.  The urethra was open without evidence of stricture.  The ureteral orifices were orthotopic.  The double J stent was seen coming out the left side.  It was grasped with an alligator forceps and extracted intact without difficulty.  The patient tolerated the procedure well    A/P Successful stent removal  Prophylactic antibiotic ordered   Stone prevention counseling provided today  -We discussed the recommendation for metabolic analysis and referral to nephrology for stone prevention  - Order for referral to nephrology placed  - Plan for follow-up with me in 1 year with a KUB    Watch for any new onset fevers, signs of UTI.  May expect some pain after removal.  If this is severe, or last many hours, you may need to return for replacement of stent.    Dylan Galaviz MD   Urology  [facility]

## 2022-08-04 NOTE — PROGRESS NOTES
Ivan Bell's goals for this visit include:   Chief Complaint   Patient presents with     Cystoscopy     Stent removal        He requests these members of his care team be copied on today's visit information:     PCP: Too [place]    Referring Provider:  No referring provider defined for this encounter.    There were no vitals taken for this visit.    Do you need any medication refills at today's visit?     Sent [employer] paper work for 2 24 hour collections per Dr. Galaviz.   Betty Acosta LPN on 8/4/2022 at 10:11 AM

## 2022-08-10 NOTE — PROGRESS NOTES
[facility]  [id number]  [place] [address]  [phone]  Dept: [phone]    PRE-OP EVALUATION:  Today's date: 6/3/2020    Ivan Bell (: 1965) presents for pre-operative evaluation assessment as requested by Dr. Valencia.  He requires evaluation and anesthesia risk assessment prior to undergoing surgery/procedure for treatment of RELEASE, TRIGGER FINGER, right ring and long finger  .    Proposed Surgery/ Procedure: RELEASE, TRIGGER FINGER, right ring and long finger   Date of Surgery/ Procedure: 20   Time of Surgery/ Procedure: 10:15am  Hospital/Surgical Facility: [facility]  Primary Physician: Too [place]  Type of Anesthesia Anticipated: Combined MAC with Local     Patient has a Health Care Directive or Living Will:  NO    1. NO - Do you have a history of heart attack, stroke, stent, bypass or surgery on an artery in the head, neck, heart or legs?  2. YES - DO YOU EVER HAVE ANY PAIN OR DISCOMFORT IN YOUR CHEST? No acute chest pain.    3. NO - Do you have a history of  Heart Failure?  4. NO - Are you troubled by shortness of breath when: walking on the level, up a slight hill or at night?  5. NO - Do you currently have a cold, bronchitis or other respiratory infection?  6. NO - Do you have a cough, shortness of breath or wheezing?  7. NO - Do you sometimes get pains in the calves of your legs when you walk?  8. NO - Do you or anyone in your family have previous history of blood clots?  9. NO - Do you or does anyone in your family have a serious bleeding problem such as prolonged bleeding following surgeries or cuts?  10. NO - Have you ever had problems with anemia or been told to take iron pills?  11. NO - Have you had any abnormal blood loss such as black, tarry or bloody stools, or abnormal vaginal bleeding?  12. YES - HAVE YOU EVER HAD A BLOOD TRANSFUSION? Had endoscopy performed during hospitalization 19.  Most recent CBC on 20 with a  Post-Op Assessment Note    CV Status:  Stable  Pain Score: 0    Pain management: adequate     Mental Status:  Alert and awake   Hydration Status:  Euvolemic   PONV Controlled:  Controlled   Airway Patency:  Patent      Post Op Vitals Reviewed: Yes      Staff: CRNA         No complications documented      BP   121/75   Temp      Pulse  70   Resp 18   SpO2 97% RA stable hemoglobin at 12.0, platelets low at 59.  13. NO - Have you or any of your relatives ever had problems with anesthesia?  14. NO - Do you have sleep apnea, excessive snoring or daytime drowsiness?  15. NO - Do you have any prosthetic heart valves?  16. NO - Do you have prosthetic joints?  17. NO - Is there any chance that you may be pregnant?      HPI:     HPI related to upcoming procedure:  Has trigger fingers of right hand off and on for the past few months.  Condition has not responded to NSAIDs and rest.  Symptoms are worsening.  Plan is for trigger finger release.          HYPERTENSION - Patient has longstanding history of HTN , currently denies any symptoms referable to elevated blood pressure. Specifically denies chest pain, palpitations, dyspnea, orthopnea, PND or peripheral edema. Blood pressure readings have been in normal range. Current medication regimen is as listed below. Patient denies any side effects of medication.       MEDICAL HISTORY:     Patient Active Problem List    Diagnosis Date Noted     Thrombocytopenia (H) 06/05/2020     Priority: Medium     Trigger finger, acquired 06/01/2020     Priority: Medium     Added automatically from request for surgery [id number]       CKD (chronic kidney disease) stage 2, GFR 60-89 ml/min 04/17/2020     Priority: Medium     Seasonal allergic rhinitis, unspecified trigger 04/17/2020     Priority: Medium     Hypokalemia 12/31/2019     Priority: Medium     Decompensation of cirrhosis of liver (H) 03/10/2019     Priority: Medium     Calculus of gallbladder without cholecystitis 09/23/2018     Priority: Medium     September 23, 2018 non-obstructing, incidental finding on us.        Nephrolithiasis 09/23/2018     Priority: Medium     September 23, 2018 non-obstructing, incident finding on us.        Hepatic encephalopathy (H) 06/10/2018     Priority: Medium     Alcoholic cirrhosis of liver with ascites (H) 03/08/2016     Priority: Medium     September 23, 2018 now  sober, ascites resolved, S/P TIP procedure 6/2018. Normal liver enzymes, diminished liver function.  INR 1.6, bilirubin 2.5, albumin 2.6. He  appears healthy. Doing well. Stressed the importance of abstaining from alcohol. See copy of recent us.     9/10/18:  Impression:   1. Patent TIPS with appropriate in stent velocities. Normal Doppler  evaluation of the remainder of the hepatic vasculature in the setting  of TIPS.  2. Cirrhotic hepatic morphology with evidence of portal hypertension,  including splenomegaly. No focal hepatic mass.  3. Cholelithiasis without evidence of cholecystitis.  4. Bilateral nonobstructing nephrolithiasis.            Hypertension goal BP (blood pressure) < 140/90 12/18/2012     Priority: Medium     24 hour contact given to patient  01/02/2012     Priority: Medium     EMERGENCY CARE PLAN  Presenting Problem Signs and Symptoms Treatment Plan    Questions or conerns during clinic hours    I will call the clinic directly     Questions or conerns outside clinic hours    I will call the 24 hour nurse line at [phone]    Patient needs to schedule an appointment    I will call the 24 hour scheduling team at [phone] or clinic directly    Same day treatment     I will call the clinic first, nurse line if after hours, urgent care and express care if needed                                 CARDIOVASCULAR SCREENING; LDL GOAL LESS THAN 130 10/31/2010     Priority: Medium     Erectile dysfunction 01/29/2008     Priority: Medium     September 23, 2018 no known CAD, no contraindications to sildenafil.        Gouty arthropathy 12/31/2006     Priority: Medium     Problem list name updated by automated process. Provider to review and confirm  [employer] Update utility       Hypertriglyceridemia 08/03/2005     Priority: Medium     Last Exam: December 19, 2007  Last Lipids: CHOL      211   01/25/2007 LDL      104   01/25/2007 HDL       83   01/25/2007  Last LFTs:: AST      106   01/25/2007   ALT       53    01/25/2007    TRIG      120   01/25/2007  TRIG      115   01/16/2006  Meds: Lopid 600 bid - tolerating        Past Medical History:   Diagnosis Date     Alcoholic cirrhosis (H)      Alcoholic hepatitis 03/2019     Gout      Hypertension      Hypertriglyceridemia      Left calcaneus fracture 1/9/2006 January 16, 2006: Fell 10 feet from ladder onto left foot on frozen ground on 1/9/06 at home.  Immediate pain and unable to walk- seen at [place] and diagnosed with calcaneus fracture     Portal vein thrombosis     left occlusion, partial main     Past Surgical History:   Procedure Laterality Date     ANKLE SURGERY Left      COLONOSCOPY N/A 3/31/2016    Procedure: COLONOSCOPY;  Surgeon: Rhys Uriostegui MD;  Location:  GI     ESOPHAGOSCOPY, GASTROSCOPY, DUODENOSCOPY (EGD), COMBINED N/A 3/31/2016    Procedure: COMBINED ESOPHAGOSCOPY, GASTROSCOPY, DUODENOSCOPY (EGD);  Surgeon: Rhys Uriostegui MD;  Location:  GI     ESOPHAGOSCOPY, GASTROSCOPY, DUODENOSCOPY (EGD), COMBINED N/A 3/9/2018    Procedure: COMBINED ESOPHAGOSCOPY, GASTROSCOPY, DUODENOSCOPY (EGD), BIOPSY SINGLE OR MULTIPLE;  EGD;  Surgeon: Gonzalo Wahl MD;  Location:  GI     ESOPHAGOSCOPY, GASTROSCOPY, DUODENOSCOPY (EGD), COMBINED N/A 6/7/2019    Procedure: ESOPHAGOGASTRODUODENOSCOPY (EGD);  Surgeon: Gonzalo Wahl MD;  Location:  GI     IR PARACENTESIS  10/29/2019     IR TRANSVEN INTRAHEPATIC PORTOSYST REV  10/29/2019     KNEE SURGERY Left      KNEE SURGERY Right      SIGMOIDOSCOPY FLEXIBLE N/A 10/31/2017    Procedure: SIGMOIDOSCOPY FLEXIBLE;;  Surgeon: Armaan Adams MD;  Location:  GI     TIPS Procedure  06/06/2018     Current Outpatient Medications   Medication Sig Dispense Refill     acetaminophen (TYLENOL) 500 MG tablet Take 1,000 mg by mouth every 6 hours as needed for mild pain       Ascorbic Acid (VITAMIN C PO) Take by mouth daily       fexofenadine (ALLEGRA) 60 MG tablet Take 1 tablet (60 mg) by  mouth daily 30 tablet 1     furosemide (LASIX) 20 MG tablet Take 1 tablet (20 mg) by mouth daily 90 tablet 3     lactulose (CONSTULOSE) 10 GM/15ML solution Take 30 mLs (20 g) by mouth daily       Menaquinone-7 (VITAMIN K2) 100 MCG CAPS Take 200 mcg by mouth daily        MULTIPLE VITAMINS PO Take 1 tablet by mouth daily       potassium chloride ER (K-DUR/KLOR-CON M) 20 MEQ CR tablet Take 2 tablets (40 mEq) by mouth daily 120 tablet 3     rifaximin (XIFAXAN) 550 MG TABS tablet Take 1 tablet (550 mg) by mouth 2 times daily 180 tablet 0     sertraline (ZOLOFT) 50 MG tablet Take 0.5 tablets (25 mg) by mouth daily 30 tablet 3     sodium bicarbonate 650 MG tablet Take 1 tablet (650 mg) by mouth 2 times daily 180 tablet 3     spironolactone (ALDACTONE) 50 MG tablet Take 1 tablet (50 mg) by mouth daily 90 tablet 3     Thiamine HCl (B-1) 100 MG TABS Take 1 tablet by mouth daily 90 tablet 3     vitamin D3 (CHOLECALCIFEROL) 2000 units (50 mcg) tablet Take 1 tablet by mouth daily       OTC products: None, except as noted above    Allergies   Allergen Reactions     Prednisone Visual Disturbance     Trazodone Visual Disturbance     Benadryl [Diphenhydramine] Other (See Comments)     Delirium (visual and auditory hallucinations)     Oxycodone Other (See Comments)     Delirium and constipation      Latex Allergy: NO    Social History     Tobacco Use     Smoking status: Former Smoker     Packs/day: 0.00     Types: Dip, chew, snus or snuff     Last attempt to quit: 1998     Years since quittin.7     Smokeless tobacco: Current User     Types: Chew     Tobacco comment: 1 tin per 10 days.   Substance Use Topics     Alcohol use: No     Alcohol/week: 17.5 standard drinks     Types: 21 Cans of beer per week     Comment: last etoh 16, did have Odouls ~2017     History   Drug Use No       REVIEW OF SYSTEMS:   Constitutional, neuro, ENT, endocrine, pulmonary, cardiac, gastrointestinal, genitourinary, musculoskeletal,  "integument and psychiatric systems are negative, except as otherwise noted.    EXAM:   /71   Pulse 78   Temp 97.2  F (36.2  C) (Tympanic)   Ht 1.778 m (5' 10\")   Wt 77.6 kg (171 lb)   SpO2 100%   BMI 24.54 kg/m      GENERAL APPEARANCE: healthy, alert and no distress     EYES: EOMI,  PERRL     HENT: ear canals and TM's normal and nose and mouth without ulcers or lesions     NECK: no adenopathy, no asymmetry, masses, or scars and thyroid normal to palpation     RESP: lungs clear to auscultation - no rales, rhonchi or wheezes     CV: regular rates and rhythm, normal S1 S2, no S3 or S4 and no murmur, click or rub     ABDOMEN:  soft, nontender, no HSM or masses and bowel sounds normal     MS: extremities normal- no gross deformities noted, no evidence of inflammation in joints, FROM in all extremities.  Triggering noted of digits 2-5 of right hand.         SKIN: no suspicious lesions or rashes     NEURO: Normal strength and tone, sensory exam grossly normal, mentation intact and speech normal     PSYCH: mentation appears normal. and affect normal/bright     LYMPHATICS: No cervical adenopathy    DIAGNOSTICS:   EKG: Not indicated due to non-vascular surgery and last ekg on 12/31/19 (within 30 days for CAD history or last year for cardiac risk factors)    Recent Labs   Lab Test 05/27/20  1118 04/29/20  1353  02/27/20  0855  02/06/20  0756   HGB 12.0* 11.2*  --   --   --  10.3*   PLT 59* 53*   < >  --   --  63*   INR  --  1.99*  --   --   --  1.86*   NA  --  143  --  140   < > 142   POTASSIUM  --  5.0  --  4.0   < > 3.9   CR  --  1.33*  --  1.24   < > 1.20    < > = values in this interval not displayed.      CMP from 6/3/20:  Stable.   IMPRESSION:   Reason for surgery/procedure: trigger finger  Diagnosis/reason for consult: preop    The proposed surgical procedure is considered INTERMEDIATE risk.    REVISED CARDIAC RISK INDEX  The patient has the following serious cardiovascular risks for perioperative " complications such as (MI, PE, VFib and 3  AV Block):  No serious cardiac risks  INTERPRETATION: 1 risks: Class II (low risk - 0.9% complication rate)    The patient has the following additional risks for perioperative complications:  No identified additional risks  See problem list below, these conditions are stable.       ICD-10-CM    1. Preop general physical exam  Z01.818 Comprehensive metabolic panel (BMP + Alb, Alk Phos, ALT, AST, Total. Bili, TP)   2. Trigger finger, acquired  M65.30    3. Seasonal allergic rhinitis, unspecified trigger  J30.2    4. Hypertension goal BP (blood pressure) < 140/90  I10    5. CKD (chronic kidney disease) stage 2, GFR 60-89 ml/min  N18.2    6. Alcoholic cirrhosis of liver with ascites (H)  K70.31 lactulose (CONSTULOSE) 10 GM/15ML solution   7. Hepatic encephalopathy (H)  K72.90 lactulose (CONSTULOSE) 10 GM/15ML solution   8. Thrombocytopenia (H)  D69.6      Finger splints dispensed today for trigger finger.    RECOMMENDATIONS:       Cardiovascular Risk  Performs 4 METs exercise without symptoms (Climb a flight of stairs) .       --Patient is to take all scheduled medications on the day of surgery    APPROVAL GIVEN to proceed with proposed procedure, without further diagnostic evaluation       Signed Electronically by: Citlali Morgan PA-C    Copy of this evaluation report is provided to requesting physician.    [facility] Preop Guidelines    Revised Cardiac Risk Index

## 2022-08-15 ENCOUNTER — PATIENT OUTREACH (OUTPATIENT)
Dept: GASTROENTEROLOGY | Facility: CLINIC | Age: 57
End: 2022-08-15

## 2022-08-15 DIAGNOSIS — K70.31 ALCOHOLIC CIRRHOSIS OF LIVER WITH ASCITES (H): Primary | ICD-10-CM

## 2022-08-15 NOTE — PROGRESS NOTES
Patient and spouse called in to report new lower extremity swelling up to knees bilaterally. He noticed this about 2 weeks ago gradually worsening. He has been taking eplerenone 100 mg AM and 50 mg PM, and furosemide 40 mg AM only. Four days ago, he added additional furosemide 20 mg in PM. The swelling does not seem to be improving, and continues to seem to be worsening. Patient did have cystoscopy with significant stone removal on 7/1 and again on 7/27. His urine is now clear light yellow. Patient denies changes in diet/increased sodium intake, and has been up and mobile in recent weeks.  Per Dr. Bales okay for patient to return to previous doses diuretics, eplerenone 100 mg AM and 100 mg PM, and furosemide 40 mg AM and 20 mg PM, labs in 1 week. Patient and spouse aware of dose change. Scheduled for lab check 8/22. Reminded pt he will need to reschedule the CD evaluation that he missed in July. 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 He states he also needs to reschedule with Dr. Bales but was not able to at the moment and states he will call back. Will check in with pt again after lab recheck.

## 2022-08-18 ENCOUNTER — MEDICAL CORRESPONDENCE (OUTPATIENT)
Dept: HEALTH INFORMATION MANAGEMENT | Facility: CLINIC | Age: 57
End: 2022-08-18

## 2022-08-18 ENCOUNTER — OFFICE VISIT (OUTPATIENT)
Dept: PHYSICAL MEDICINE AND REHAB | Facility: CLINIC | Age: 57
End: 2022-08-18
Payer: COMMERCIAL

## 2022-08-18 VITALS
SYSTOLIC BLOOD PRESSURE: 155 MMHG | HEART RATE: 80 BPM | DIASTOLIC BLOOD PRESSURE: 70 MMHG | HEIGHT: 70 IN | BODY MASS INDEX: 25.05 KG/M2 | WEIGHT: 175 LBS | OXYGEN SATURATION: 99 %

## 2022-08-18 DIAGNOSIS — M51.369 DDD (DEGENERATIVE DISC DISEASE), LUMBAR: ICD-10-CM

## 2022-08-18 DIAGNOSIS — G89.29 CHRONIC BILATERAL LOW BACK PAIN WITH RIGHT-SIDED SCIATICA: Primary | ICD-10-CM

## 2022-08-18 DIAGNOSIS — M54.16 RIGHT LUMBAR RADICULOPATHY: ICD-10-CM

## 2022-08-18 DIAGNOSIS — M54.41 CHRONIC BILATERAL LOW BACK PAIN WITH RIGHT-SIDED SCIATICA: Primary | ICD-10-CM

## 2022-08-18 PROCEDURE — 99204 OFFICE O/P NEW MOD 45 MIN: CPT | Performed by: NURSE PRACTITIONER

## 2022-08-18 ASSESSMENT — PAIN SCALES - GENERAL: PAINLEVEL: EXTREME PAIN (9)

## 2022-08-18 NOTE — LETTER
8/18/2022         RE: Ivan Bell  [id number] [facility] [address]        Dear Colleague,    Thank you for referring your patient, Ivan Bell, to the [facility]. Please see a copy of my visit note below.    ASSESSMENT: Ivan Bell is a 56 year old male who presents for consultation at the request of PCP [place], with a past medical history significant for hypertriglyceridemia, hypertension, gouty arthropathy, nephrolithiasis, chronic kidney disease stage II, thrombocytopenia, alcoholic cirrhosis of the liver with ascites, cystoscopy with lithotripsy and bilateral ureteral stent placement 7/1/2022 with stent removal 7/27/2022, who presents today for new patient evaluation of:    -Chronic bilateral low back pain with right lumbar radiculopathy L4 and L5 dermatomal pattern.  MRI with degenerative changes at both L4-5 and L5-S1 with foraminal stenosis.    Patient is neurologically intact on exam. No myelopathic or red flag symptoms.     OSWESTRY DISABILITY INDEX 4/13/2021   Count 10   Sum 22   Oswestry Score (%) 44   Some recent data might be hidden            Diagnoses and all orders for this visit:  Chronic bilateral low back pain with right-sided sciatica  -     PAIN Transforaminal RIYA Inj Lumbosacral Right; Future  Right lumbar radiculopathy  -     PAIN Transforaminal RIYA Inj Lumbosacral Right; Future  DDD (degenerative disc disease), lumbar  -     PAIN Transforaminal RIYA Inj Lumbosacral Right; Future    PLAN:  Reviewed spine anatomy and disease process. Discussed diagnosis and treatment options with the patient today. A shared decision making model was used.  The patient's values and choices were respected. The following represents what was discussed and decided upon by the provider and the patient.      -DIAGNOSTIC TESTS:  Images were personally reviewed and interpreted and explained to patient today using spine model.   --Lumbar spine MRI 2021 with  L4-5 moderate disc height loss with bone spurring with left disc protrusion with left L5 nerve root compression, mild bilateral foraminal stenosis.  L5-S1 moderate disc height loss with facet arthropathy with moderate bilateral foraminal stenosis.    -PHYSICAL THERAPY: Encourage patient continue with home exercises from prior physical therapy sessions from [facility], which he is diligent about doing at this time on a daily basis, however minimal benefit currently.  Discussed the importance of core strengthening, ROM, stretching exercises with the patient and how each of these entities is important in decreasing pain.  Explained to the patient that the purpose of physical therapy is to teach the patient a home exercise program.  These exercises need to be performed every day in order to decrease pain and prevent future occurrences of pain.        -MEDICATIONS: No medication changes at this time  Discussed multiple medication options today with patient. Discussed risks, side effects, and proper use of medications. Patient verbalized understanding.    -INTERVENTIONS: Ordered a right L4-5 and L5-S1 transforaminal epidural steroid injection to see if we can get further relief of lumbar radiculopathy.  He has had benefit with injections/epidural steroid injections in the past and he does have foraminal stenosis bilaterally at L4-5 and L5-S1.  Did discuss that if his left-sided symptoms are still ongoing 2 weeks postinjection we could consider a left RIYA, currently symptoms are greatest on the right.  He is hoping for an urgent injection due to the significance of his pain currently as well, advised patient that we will check with insurance authorization first.  Discussed risks and benefits of injections with patient today.    -PATIENT EDUCATION:  Total time of 46 minutes, on the day of service, spent with the patient, reviewing the chart, placing orders, and documenting.   -Today we also discussed the issues  related to the current COVID-19 pandemic, the pros and cons of the current treatment plan, the CDC guidelines such as social distancing, washing hands, masking, and covering the cough.    -FOLLOW-UP:   Follow-up for urgent injection with either Dr. Medrano or Dr. Nova, first available is okay per his preference    Advised patient to call the [facility]ine Center if symptoms worsen or you have problems controlling bladder and bowel function.   ______________________________________________________________________    SUBJECTIVE:  HPI:  Ivan Bell  Is a 56 year old male who presents today for new patient evaluation of low back pain chronic bilateral lumbosacral junction that is been ongoing for many years with radiation pain into the right anterior and lateral thigh and occasionally into the lateral shin with associated numbness and tingling.  Currently his pain is a 9/10 constant type pain worse in the last couple weeks, a 3 at its best.  He does report that he has had injections with [facility] nothing in the last 1 year but did have significant relief with these injections and is hoping for repeat injection.  He does report that he is very active with his job but also that is currently aggravating his back pain.  Patient denies any lower extremity weakness, denies any recent trips or falls or balance changes, denies bowel or bladder loss control.    -Treatment to Date: No prior spinal surgery     History of lumbar epidural steroid injection [facility] 2021 with benefit    -Medications:  Acetaminophen    Current Outpatient Medications   Medication     acetaminophen (TYLENOL) 500 MG tablet     Ascorbic Acid (VITAMIN C PO)     benzonatate (TESSALON) 100 MG capsule     docusate sodium (COLACE) 100 MG tablet     docusate sodium (COLACE) 100 MG tablet     eplerenone (INSPRA) 50 MG tablet     fexofenadine (ALLEGRA) 60 MG tablet     furosemide (LASIX) 40 MG tablet     lactulose (CHRONULAC) 10 GM/15ML solution      Menaquinone-7 (VITAMIN K2) 100 MCG CAPS     MULTIPLE VITAMINS PO     ondansetron (ZOFRAN-ODT) 4 MG ODT tab     oxybutynin ER (DITROPAN XL) 5 MG 24 hr tablet     potassium chloride ER (KLOR-CON M) 20 MEQ CR tablet     rifaximin (XIFAXAN) 550 MG TABS tablet     sertraline (ZOLOFT) 50 MG tablet     sildenafil (REVATIO) 20 MG tablet     sodium bicarbonate 650 MG tablet     tamsulosin (FLOMAX) 0.4 MG capsule     vitamin D3 (CHOLECALCIFEROL) 2000 units (50 mcg) tablet     No current facility-administered medications for this visit.       Allergies   Allergen Reactions     Prednisone Visual Disturbance     Trazodone Visual Disturbance     Benadryl [Diphenhydramine] Other (See Comments)     Delirium (visual and auditory hallucinations)       Past Medical History:   Diagnosis Date     Alcoholic cirrhosis of liver (H)      Alcoholic hepatitis 03/2019     Chronic kidney disease     CRF     Depression      Gout      History of transfusion      Hypertension      Hypertriglyceridemia      Left calcaneus fracture 01/09/2006 January 16, 2006: Fell 10 feet from ladder onto left foot on frozen ground on 1/9/06 at home.  Immediate pain and unable to walk- seen at [place] and diagnosed with calcaneus fracture     Nephrolithiasis      Osteoarthritis      Portal vein thrombosis     left occlusion, partial main     Thrombocytopenia (H)         Patient Active Problem List   Diagnosis     Hypertriglyceridemia     Gouty arthropathy     Erectile dysfunction     CARDIOVASCULAR SCREENING; LDL GOAL LESS THAN 130     Hypertension goal BP (blood pressure) < 140/90     Alcoholic cirrhosis of liver with ascites (H)     Nephrolithiasis     CKD (chronic kidney disease) stage 2, GFR 60-89 ml/min     Seasonal allergic rhinitis, unspecified trigger     Thrombocytopenia (H)     Cervicalgia       Past Surgical History:   Procedure Laterality Date     ANKLE SURGERY Left      ANKLE SURGERY       ARTHROSCOPY KNEE       COLONOSCOPY N/A 03/31/2016     Procedure: COLONOSCOPY;  Surgeon: Rhys Uriostegui MD;  Location:  GI     COMBINED CYSTOSCOPY, RETROGRADES, URETEROSCOPY, LASER HOLMIUM LITHOTRIPSY URETER(S), INSERT STENT Bilateral 7/1/2022    Procedure: CYSTOSCOPY, RIGHT RETROGRADE PYELOGRAM, RIGHT URETEROSCOPY WITH LASER LITHOTRIPSY AND BASKET REMOVAL OF STONE, RIGHT URETERAL STENT PLACEMENT, LEFT RETROGRADE PYELOGRAM, LEFT URETEROSCOPY WITH LASER LITHOTRIPSY AND BASKET REMOVAL OF STONE, LEFT URETERAL STENT PLACEMENT;  Surgeon: Dylan Galaviz MD;  Location:  OR     COMBINED CYSTOSCOPY, RETROGRADES, URETEROSCOPY, LASER HOLMIUM LITHOTRIPSY URETER(S), INSERT STENT Bilateral 7/27/2022    Procedure: CYSTOSCOPY, BILATERAL URETERAL STENT REMOVAL, BILATERAL  RETROGRADE PYELOGRAM, BILATERAL URETEROSCOPY. HOLMIUM LASER LITHOTRIPSY LEFT SIDE.  AND BASKET REMOVAL OF STONES BILATERAL, LEFT  URETERAL STENT PLACEMENT;  Surgeon: Dylan Galaviz MD;  Location:  OR     ESOPHAGOSCOPY, GASTROSCOPY, DUODENOSCOPY (EGD), COMBINED N/A 03/31/2016    Procedure: COMBINED ESOPHAGOSCOPY, GASTROSCOPY, DUODENOSCOPY (EGD);  Surgeon: Rhys Uriostegui MD;  Location:  GI     ESOPHAGOSCOPY, GASTROSCOPY, DUODENOSCOPY (EGD), COMBINED N/A 03/09/2018    Procedure: COMBINED ESOPHAGOSCOPY, GASTROSCOPY, DUODENOSCOPY (EGD), BIOPSY SINGLE OR MULTIPLE;  EGD;  Surgeon: Gonzalo Wahl MD;  Location:  GI     ESOPHAGOSCOPY, GASTROSCOPY, DUODENOSCOPY (EGD), COMBINED N/A 06/07/2019    Procedure: ESOPHAGOGASTRODUODENOSCOPY (EGD);  Surgeon: Gonzalo Wahl MD;  Location:  GI     FOOT SURGERY       IR PARACENTESIS  10/29/2019     IR PARACENTESIS  11/25/2020     IR PARACENTESIS  08/11/2021     IR PARACENTESIS  01/28/2022     IR PARACENTESIS  03/30/2022     IR TRANSVEN INTRAHEPATIC PORTOSYST REV  10/29/2019     IR TRANSVEN INTRAHEPATIC PORTOSYST REV  11/25/2020     IR TRANSVEN INTRAHEPATIC PORTOSYST REV  08/11/2021     IR TRANSVEN INTRAHEPATIC PORTOSYST REV   "01/28/2022     IR TRANSVEN INTRAHEPATIC PORTOSYST REV  03/30/2022     KNEE SURGERY Left      KNEE SURGERY Right      RELEASE CARPAL TUNNEL       RELEASE TRIGGER FINGER Right 06/11/2020    Procedure: RELEASE, TRIGGER FINGER, right ring and long finger;  Surgeon: Pascual Valencia MD;  Location: [facility] OR     SIGMOIDOSCOPY FLEXIBLE N/A 10/31/2017    Procedure: SIGMOIDOSCOPY FLEXIBLE;;  Surgeon: Armaan Adams MD;  Location: [facility] GI     TIPS Procedure  06/06/2018     TIPS PROCEDURE  11/01/2020     ZZC PLASTY KNEE,MED OR LAT COMPARTMT Right 02/19/2021    Procedure: RIGHT UNICOMPARTMENTAL ARTHROPLASTY KNEE MEDIAL;  Surgeon: José Miguel Landaverde MD;  Location: [facility] OR;  Service: Orthopedics       Family History   Problem Relation Age of Onset     Family History Negative Mother      Family History Negative Father      Hypertension Father      Cerebrovascular Disease Father 87     Breast Cancer Maternal Grandmother      Rheumatoid Arthritis Daughter      Depression Daughter      Substance Abuse Brother      Cancer - colorectal No family hx of      Prostate Cancer No family hx of      Liver Disease No family hx of        Reviewed past medical, surgical, and family history with patient found on new patient intake packet located in [employer]MR [employer] tab.     SOCIAL HX: Patient reports being a former smoker, denies current tobacco use.  Does report history of being a heavy drinker, quit 4 years ago, denies current recreational drug use.    ROS: Positive for muscle pain, excessive tiredness, headache, leg swelling. Specifically negative for bowel/bladder dysfunction, balance changes, headache, dizziness, foot drop, fevers, chills, appetite changes, nausea/vomiting, unexplained weight loss. Otherwise 13 systems reviewed are negative. Please see the patient's intake questionnaire from today for details.    OBJECTIVE:  BP (!) 155/70 (BP Location: Right arm, Patient Position: Sitting)   Pulse 80   Ht 5' 10\" (1.778 m)   Wt 175 lb " (79.4 kg)   SpO2 99%   BMI 25.11 kg/m      PHYSICAL EXAMINATION:    --CONSTITUTIONAL:  Vital signs as above.  No acute distress.  The patient is well nourished and well groomed.  --PSYCHIATRIC:  Appropriate mood and affect. The patient is awake, alert, oriented to person, place, time and answering questions appropriately with clear speech.    --SKIN:  Skin over the face, bilateral lower extremities, and posterior torso is clean, dry, intact without rashes.    --RESPIRATORY: Normal rhythm and effort. No abnormal accessory muscle breathing patterns noted.   --ABDOMINAL:  Non-distended.  --STANDING EXAMINATION:  Normal lumbar lordosis noted, no lateral shift.  --MUSCULOSKELETAL: Lumbar spine inspection reveals no evidence of deformity. Range of motion is not limited in lumbar flexion, extension, lateral rotation. No tenderness to palpation lumbar spine. Straight leg raising in the seated position is negative to radicular pain on the left, positive on the right. Sciatic notch non-tender.  --GROSS MOTOR: Gait is non-antalgic. Easily arises from a seated position.   --LOWER EXTREMITY MOTOR TESTING:  Plantar flexion left 5/5, right 5/5   Dorsiflexion left 5/5, right 5/5   Great toe MTP extension left 5/5, right 5/5  Knee flexion left 5/5, right 5/5  Knee extension left 5/5, right 5/5   Hip flexion left 5/5, right 5/5  Hip abduction left 5/5, right 5/5  Hip adduction left 5/5, right 5/5   --HIPS: Full range of motion bilaterally. Negative FABERs on the involved lower extremity.   --NEUROLOGICAL:  2/4 patellar, medial hamstring, and achilles reflexes bilaterally.  Sensation to light touch is intact in the bilateral L4, L5, and S1 dermatomes. Babinski is negative. No clonus.  Negative Haydee reflex bilaterally.  --VASCULAR:  2/4 dorsalis pedis and posterior tibialsi pulses bilaterally.  Bilateral lower extremities are warm.  There is no pitting edema of the bilateral lower extremities.    RESULTS: Prior medical records  from [facility] were reviewed today.    Imaging: Spine imaging was personally reviewed and interpreted today. The images were shown to the patient and the findings were explained using a spine model.      Lumbar spine MRI reviewed from 2021             Again, thank you for allowing me to participate in the care of your patient.        Sincerely,        Nina Montoya, CNP

## 2022-08-18 NOTE — PROGRESS NOTES
ASSESSMENT: Ivan Bell is a 56 year old male who presents for consultation at the request of PCP [place], with a past medical history significant for hypertriglyceridemia, hypertension, gouty arthropathy, nephrolithiasis, chronic kidney disease stage II, thrombocytopenia, alcoholic cirrhosis of the liver with ascites, cystoscopy with lithotripsy and bilateral ureteral stent placement 7/1/2022 with stent removal 7/27/2022, who presents today for new patient evaluation of:    -Chronic bilateral low back pain with right lumbar radiculopathy L4 and L5 dermatomal pattern.  MRI with degenerative changes at both L4-5 and L5-S1 with foraminal stenosis.    Patient is neurologically intact on exam. No myelopathic or red flag symptoms.     OSWESTRY DISABILITY INDEX 4/13/2021   Count 10   Sum 22   Oswestry Score (%) 44   Some recent data might be hidden            Diagnoses and all orders for this visit:  Chronic bilateral low back pain with right-sided sciatica  -     PAIN Transforaminal RIYA Inj Lumbosacral Right; Future  Right lumbar radiculopathy  -     PAIN Transforaminal RIYA Inj Lumbosacral Right; Future  DDD (degenerative disc disease), lumbar  -     PAIN Transforaminal RIYA Inj Lumbosacral Right; Future    PLAN:  Reviewed spine anatomy and disease process. Discussed diagnosis and treatment options with the patient today. A shared decision making model was used.  The patient's values and choices were respected. The following represents what was discussed and decided upon by the provider and the patient.      -DIAGNOSTIC TESTS:  Images were personally reviewed and interpreted and explained to patient today using spine model.   --Lumbar spine MRI 2021 with L4-5 moderate disc height loss with bone spurring with left disc protrusion with left L5 nerve root compression, mild bilateral foraminal stenosis.  L5-S1 moderate disc height loss with facet arthropathy with moderate bilateral foraminal  stenosis.    -PHYSICAL THERAPY: Encourage patient continue with home exercises from prior physical therapy sessions from [facility], [place], which he is diligent about doing at this time on a daily basis, however minimal benefit currently.  Discussed the importance of core strengthening, ROM, stretching exercises with the patient and how each of these entities is important in decreasing pain.  Explained to the patient that the purpose of physical therapy is to teach the patient a home exercise program.  These exercises need to be performed every day in order to decrease pain and prevent future occurrences of pain.        -MEDICATIONS: No medication changes at this time  Discussed multiple medication options today with patient. Discussed risks, side effects, and proper use of medications. Patient verbalized understanding.    -INTERVENTIONS: Ordered a right L4-5 and L5-S1 transforaminal epidural steroid injection to see if we can get further relief of lumbar radiculopathy.  He has had benefit with injections/epidural steroid injections in the past and he does have foraminal stenosis bilaterally at L4-5 and L5-S1.  Did discuss that if his left-sided symptoms are still ongoing 2 weeks postinjection we could consider a left RIYA, currently symptoms are greatest on the right.  He is hoping for an urgent injection due to the significance of his pain currently as well, advised patient that we will check with insurance authorization first.  Discussed risks and benefits of injections with patient today.    -PATIENT EDUCATION:  Total time of 46 minutes, on the day of service, spent with the patient, reviewing the chart, placing orders, and documenting.   -Today we also discussed the issues related to the current COVID-19 pandemic, the pros and cons of the current treatment plan, the CDC guidelines such as social distancing, washing hands, masking, and covering the cough.    -FOLLOW-UP:   Follow-up for urgent injection with  either Dr. Medrano or Dr. Nova, first available is mary lou per his preference    Advised patient to call the Spine Center if symptoms worsen or you have problems controlling bladder and bowel function.   ______________________________________________________________________    SUBJECTIVE:  HPI:  Ivan Bell  Is a 56 year old male who presents today for new patient evaluation of low back pain chronic bilateral lumbosacral junction that is been ongoing for many years with radiation pain into the right anterior and lateral thigh and occasionally into the lateral shin with associated numbness and tingling.  Currently his pain is a 9/10 constant type pain worse in the last couple weeks, a 3 at its best.  He does report that he has had injections with [facility] nothing in the last 1 year but did have significant relief with these injections and is hoping for repeat injection.  He does report that he is very active with his job but also that is currently aggravating his back pain.  Patient denies any lower extremity weakness, denies any recent trips or falls or balance changes, denies bowel or bladder loss control.    -Treatment to Date: No prior spinal surgery     History of lumbar epidural steroid injection [facility] 2021 with benefit    -Medications:  Acetaminophen    Current Outpatient Medications   Medication     acetaminophen (TYLENOL) 500 MG tablet     Ascorbic Acid (VITAMIN C PO)     benzonatate (TESSALON) 100 MG capsule     docusate sodium (COLACE) 100 MG tablet     docusate sodium (COLACE) 100 MG tablet     eplerenone (INSPRA) 50 MG tablet     fexofenadine (ALLEGRA) 60 MG tablet     furosemide (LASIX) 40 MG tablet     lactulose (CHRONULAC) 10 GM/15ML solution     Menaquinone-7 (VITAMIN K2) 100 MCG CAPS     MULTIPLE VITAMINS PO     ondansetron (ZOFRAN-ODT) 4 MG ODT tab     oxybutynin ER (DITROPAN XL) 5 MG 24 hr tablet     potassium chloride ER (KLOR-CON M) 20 MEQ CR tablet     rifaximin  (XIFAXAN) 550 MG TABS tablet     sertraline (ZOLOFT) 50 MG tablet     sildenafil (REVATIO) 20 MG tablet     sodium bicarbonate 650 MG tablet     tamsulosin (FLOMAX) 0.4 MG capsule     vitamin D3 (CHOLECALCIFEROL) 2000 units (50 mcg) tablet     No current facility-administered medications for this visit.       Allergies   Allergen Reactions     Prednisone Visual Disturbance     Trazodone Visual Disturbance     Benadryl [Diphenhydramine] Other (See Comments)     Delirium (visual and auditory hallucinations)       Past Medical History:   Diagnosis Date     Alcoholic cirrhosis of liver (H)      Alcoholic hepatitis 03/2019     Chronic kidney disease     CRF     Depression      Gout      History of transfusion      Hypertension      Hypertriglyceridemia      Left calcaneus fracture 01/09/2006 January 16, 2006: Fell 10 feet from ladder onto left foot on frozen ground on 1/9/06 at home.  Immediate pain and unable to walk- seen at [place] and diagnosed with calcaneus fracture     Nephrolithiasis      Osteoarthritis      Portal vein thrombosis     left occlusion, partial main     Thrombocytopenia (H)         Patient Active Problem List   Diagnosis     Hypertriglyceridemia     Gouty arthropathy     Erectile dysfunction     CARDIOVASCULAR SCREENING; LDL GOAL LESS THAN 130     Hypertension goal BP (blood pressure) < 140/90     Alcoholic cirrhosis of liver with ascites (H)     Nephrolithiasis     CKD (chronic kidney disease) stage 2, GFR 60-89 ml/min     Seasonal allergic rhinitis, unspecified trigger     Thrombocytopenia (H)     Cervicalgia       Past Surgical History:   Procedure Laterality Date     ANKLE SURGERY Left      ANKLE SURGERY       ARTHROSCOPY KNEE       COLONOSCOPY N/A 03/31/2016    Procedure: COLONOSCOPY;  Surgeon: Rhys Uriostegui MD;  Location: [facility]     COMBINED CYSTOSCOPY, RETROGRADES, URETEROSCOPY, LASER HOLMIUM LITHOTRIPSY URETER(S), INSERT STENT Bilateral 7/1/2022    Procedure: CYSTOSCOPY,  RIGHT RETROGRADE PYELOGRAM, RIGHT URETEROSCOPY WITH LASER LITHOTRIPSY AND BASKET REMOVAL OF STONE, RIGHT URETERAL STENT PLACEMENT, LEFT RETROGRADE PYELOGRAM, LEFT URETEROSCOPY WITH LASER LITHOTRIPSY AND BASKET REMOVAL OF STONE, LEFT URETERAL STENT PLACEMENT;  Surgeon: Dylan Galaviz MD;  Location:  OR     COMBINED CYSTOSCOPY, RETROGRADES, URETEROSCOPY, LASER HOLMIUM LITHOTRIPSY URETER(S), INSERT STENT Bilateral 7/27/2022    Procedure: CYSTOSCOPY, BILATERAL URETERAL STENT REMOVAL, BILATERAL  RETROGRADE PYELOGRAM, BILATERAL URETEROSCOPY. HOLMIUM LASER LITHOTRIPSY LEFT SIDE.  AND BASKET REMOVAL OF STONES BILATERAL, LEFT  URETERAL STENT PLACEMENT;  Surgeon: Dylan Galaviz MD;  Location:  OR     ESOPHAGOSCOPY, GASTROSCOPY, DUODENOSCOPY (EGD), COMBINED N/A 03/31/2016    Procedure: COMBINED ESOPHAGOSCOPY, GASTROSCOPY, DUODENOSCOPY (EGD);  Surgeon: Rhys Uriostegui MD;  Location:  GI     ESOPHAGOSCOPY, GASTROSCOPY, DUODENOSCOPY (EGD), COMBINED N/A 03/09/2018    Procedure: COMBINED ESOPHAGOSCOPY, GASTROSCOPY, DUODENOSCOPY (EGD), BIOPSY SINGLE OR MULTIPLE;  EGD;  Surgeon: Gonzalo Wahl MD;  Location:  GI     ESOPHAGOSCOPY, GASTROSCOPY, DUODENOSCOPY (EGD), COMBINED N/A 06/07/2019    Procedure: ESOPHAGOGASTRODUODENOSCOPY (EGD);  Surgeon: Gonzalo Wahl MD;  Location:  GI     FOOT SURGERY       IR PARACENTESIS  10/29/2019     IR PARACENTESIS  11/25/2020     IR PARACENTESIS  08/11/2021     IR PARACENTESIS  01/28/2022     IR PARACENTESIS  03/30/2022     IR TRANSVEN INTRAHEPATIC PORTOSYST REV  10/29/2019     IR TRANSVEN INTRAHEPATIC PORTOSYST REV  11/25/2020     IR TRANSVEN INTRAHEPATIC PORTOSYST REV  08/11/2021     IR TRANSVEN INTRAHEPATIC PORTOSYST REV  01/28/2022     IR TRANSVEN INTRAHEPATIC PORTOSYST REV  03/30/2022     KNEE SURGERY Left      KNEE SURGERY Right      RELEASE CARPAL TUNNEL       RELEASE TRIGGER FINGER Right 06/11/2020    Procedure: RELEASE, TRIGGER FINGER, right ring  "and long finger;  Surgeon: Pascual Valencia MD;  Location: [facility]     SIGMOIDOSCOPY FLEXIBLE N/A 10/31/2017    Procedure: SIGMOIDOSCOPY FLEXIBLE;;  Surgeon: Armaan Adams MD;  Location: [facility]     TIPS Procedure  06/06/2018     TIPS PROCEDURE  11/01/2020     ZZC PLASTY KNEE,MED OR LAT COMPARTMT Right 02/19/2021    Procedure: RIGHT UNICOMPARTMENTAL ARTHROPLASTY KNEE MEDIAL;  Surgeon: José Miguel Landaverde MD;  Location: [facility] OR;  Service: Orthopedics       Family History   Problem Relation Age of Onset     Family History Negative Mother      Family History Negative Father      Hypertension Father      Cerebrovascular Disease Father 87     Breast Cancer Maternal Grandmother      Rheumatoid Arthritis Daughter      Depression Daughter      Substance Abuse Brother      Cancer - colorectal No family hx of      Prostate Cancer No family hx of      Liver Disease No family hx of        Reviewed past medical, surgical, and family history with patient found on new patient intake packet located in EMR Media tab.     SOCIAL HX: Patient reports being a former smoker, denies current tobacco use.  Does report history of being a heavy drinker, quit 4 years ago, denies current recreational drug use.    ROS: Positive for muscle pain, excessive tiredness, headache, leg swelling. Specifically negative for bowel/bladder dysfunction, balance changes, headache, dizziness, foot drop, fevers, chills, appetite changes, nausea/vomiting, unexplained weight loss. Otherwise 13 systems reviewed are negative. Please see the patient's intake questionnaire from today for details.    OBJECTIVE:  BP (!) 155/70 (BP Location: Right arm, Patient Position: Sitting)   Pulse 80   Ht 5' 10\" (1.778 m)   Wt 175 lb (79.4 kg)   SpO2 99%   BMI 25.11 kg/m      PHYSICAL EXAMINATION:    --CONSTITUTIONAL:  Vital signs as above.  No acute distress.  The patient is well nourished and well groomed.  --PSYCHIATRIC:  Appropriate mood and affect. The patient " is awake, alert, oriented to person, place, time and answering questions appropriately with clear speech.    --SKIN:  Skin over the face, bilateral lower extremities, and posterior torso is clean, dry, intact without rashes.    --RESPIRATORY: Normal rhythm and effort. No abnormal accessory muscle breathing patterns noted.   --ABDOMINAL:  Non-distended.  --STANDING EXAMINATION:  Normal lumbar lordosis noted, no lateral shift.  --MUSCULOSKELETAL: Lumbar spine inspection reveals no evidence of deformity. Range of motion is not limited in lumbar flexion, extension, lateral rotation. No tenderness to palpation lumbar spine. Straight leg raising in the seated position is negative to radicular pain on the left, positive on the right. Sciatic notch non-tender.  --GROSS MOTOR: Gait is non-antalgic. Easily arises from a seated position.   --LOWER EXTREMITY MOTOR TESTING:  Plantar flexion left 5/5, right 5/5   Dorsiflexion left 5/5, right 5/5   Great toe MTP extension left 5/5, right 5/5  Knee flexion left 5/5, right 5/5  Knee extension left 5/5, right 5/5   Hip flexion left 5/5, right 5/5  Hip abduction left 5/5, right 5/5  Hip adduction left 5/5, right 5/5   --HIPS: Full range of motion bilaterally. Negative FABERs on the involved lower extremity.   --NEUROLOGICAL:  2/4 patellar, medial hamstring, and achilles reflexes bilaterally.  Sensation to light touch is intact in the bilateral L4, L5, and S1 dermatomes. Babinski is negative. No clonus.  Negative Haydee reflex bilaterally.  --VASCULAR:  2/4 dorsalis pedis and posterior tibialsi pulses bilaterally.  Bilateral lower extremities are warm.  There is no pitting edema of the bilateral lower extremities.    RESULTS: Prior medical records from [facility] and [facility]where were reviewed today.    Imaging: Spine imaging was personally reviewed and interpreted today. The images were shown to the patient and the findings were explained using a spine model.      Lumbar  spine MRI reviewed from 2021

## 2022-08-18 NOTE — PATIENT INSTRUCTIONS
~Please call our [facility] Nurse Navigation line [phone] with any questions or concerns about your treatment plan, if symptoms worsen and you would like to be seen urgently, or if you have problems controlling bladder and bowel function.       An injection has been ordered today to potentially help with your pain symptoms. These injections do not fix what is going on in your back, therefore they typically do not take away the pain completely, however they can many times help improve symptoms. Injections should always be completed along with other modalities such as physical therapy for the best long term outcomes. If injections alone are done, then pain will likely return.     [facility] Injection Requirements    A  is required for all fluoroscopically-guided injections.  Injection appointments may be cancelled if there are signs/symptoms of an active infection or if the patient is being actively treated with antibiotics for a diagnosed infection.  Patients may have their steroid injection cancelled if they have had another steroid injection within 2 weeks.  Diabetic patients will have their blood glucose levels checked the day of their injection and the appointment will be rescheduled if the blood glucose level is 300 or higher.  Patients with allergies to cortisone, local anesthetics, iodine, or contrast dye should contact the Spine Center to further discuss these considerations.  Patients scheduled for medial branch block diagnostic injections should refrain from taking pain medication the day of the procedure.  The medial branch block injection appointment will be rescheduled if the patient's pain rating is not 5/10 or greater at the time of the procedure.  Patients taking warfarin/Coumadin will have their INR checked the day of the procedure and the procedure may be rescheduled if the INR is greater than 3.0.  Please contact the Spine Center (#[phone]) if  you are taking any prescription blood-thinning medications (warfarin, Plavix, Lovenox, Eliquis, Brilinta, Effient, etc.) as special dosing adjustments may need to be made depending on the type of injection you are scheduled to receive.  It is recommended that you delay having your steroid injection if you have received a flu shot or shingles vaccine within 2 weeks.

## 2022-08-22 ENCOUNTER — ANCILLARY PROCEDURE (OUTPATIENT)
Dept: ULTRASOUND IMAGING | Facility: CLINIC | Age: 57
End: 2022-08-22
Attending: RADIOLOGY
Payer: COMMERCIAL

## 2022-08-22 ENCOUNTER — LAB (OUTPATIENT)
Dept: LAB | Facility: CLINIC | Age: 57
End: 2022-08-22
Attending: RADIOLOGY
Payer: COMMERCIAL

## 2022-08-22 DIAGNOSIS — Z13.220 SCREENING FOR HYPERLIPIDEMIA: ICD-10-CM

## 2022-08-22 DIAGNOSIS — Z95.828 S/P TIPS (TRANSJUGULAR INTRAHEPATIC PORTOSYSTEMIC SHUNT): ICD-10-CM

## 2022-08-22 DIAGNOSIS — E78.1 PURE HYPERGLYCERIDEMIA: ICD-10-CM

## 2022-08-22 DIAGNOSIS — K70.31 ALCOHOLIC CIRRHOSIS OF LIVER WITH ASCITES (H): ICD-10-CM

## 2022-08-22 LAB
ALBUMIN SERPL-MCNC: 2.2 G/DL (ref 3.4–5)
ALP SERPL-CCNC: 145 U/L (ref 40–150)
ALT SERPL W P-5'-P-CCNC: 19 U/L (ref 0–70)
ANION GAP SERPL CALCULATED.3IONS-SCNC: <1 MMOL/L (ref 3–14)
AST SERPL W P-5'-P-CCNC: 32 U/L (ref 0–45)
BILIRUB DIRECT SERPL-MCNC: 0.9 MG/DL (ref 0–0.2)
BILIRUB SERPL-MCNC: 2.2 MG/DL (ref 0.2–1.3)
BUN SERPL-MCNC: 12 MG/DL (ref 7–30)
CALCIUM SERPL-MCNC: 8.2 MG/DL (ref 8.5–10.1)
CHLORIDE BLD-SCNC: 116 MMOL/L (ref 94–109)
CHOLEST SERPL-MCNC: 132 MG/DL
CO2 SERPL-SCNC: 23 MMOL/L (ref 20–32)
CREAT SERPL-MCNC: 1.2 MG/DL (ref 0.66–1.25)
ERYTHROCYTE [DISTWIDTH] IN BLOOD BY AUTOMATED COUNT: 16.5 % (ref 10–15)
FASTING STATUS PATIENT QL REPORTED: YES
GFR SERPL CREATININE-BSD FRML MDRD: 70 ML/MIN/1.73M2
GLUCOSE BLD-MCNC: 93 MG/DL (ref 70–99)
HCT VFR BLD AUTO: 31.3 % (ref 40–53)
HDLC SERPL-MCNC: 75 MG/DL
HGB BLD-MCNC: 10 G/DL (ref 13.3–17.7)
INR PPP: 1.69 (ref 0.85–1.15)
LDLC SERPL CALC-MCNC: 41 MG/DL
MCH RBC QN AUTO: 29.1 PG (ref 26.5–33)
MCHC RBC AUTO-ENTMCNC: 31.9 G/DL (ref 31.5–36.5)
MCV RBC AUTO: 91 FL (ref 78–100)
NONHDLC SERPL-MCNC: 57 MG/DL
PLATELET # BLD AUTO: 83 10E3/UL (ref 150–450)
POTASSIUM BLD-SCNC: 3.9 MMOL/L (ref 3.4–5.3)
PROT SERPL-MCNC: 5.5 G/DL (ref 6.8–8.8)
RBC # BLD AUTO: 3.44 10E6/UL (ref 4.4–5.9)
SODIUM SERPL-SCNC: 138 MMOL/L (ref 133–144)
TRIGL SERPL-MCNC: 82 MG/DL
WBC # BLD AUTO: 4.2 10E3/UL (ref 4–11)

## 2022-08-22 PROCEDURE — 85610 PROTHROMBIN TIME: CPT | Performed by: PATHOLOGY

## 2022-08-22 PROCEDURE — 85027 COMPLETE CBC AUTOMATED: CPT | Performed by: PATHOLOGY

## 2022-08-22 PROCEDURE — 80053 COMPREHEN METABOLIC PANEL: CPT | Performed by: PATHOLOGY

## 2022-08-22 PROCEDURE — 93975 VASCULAR STUDY: CPT | Mod: GC | Performed by: STUDENT IN AN ORGANIZED HEALTH CARE EDUCATION/TRAINING PROGRAM

## 2022-08-22 PROCEDURE — 76700 US EXAM ABDOM COMPLETE: CPT | Mod: GC | Performed by: STUDENT IN AN ORGANIZED HEALTH CARE EDUCATION/TRAINING PROGRAM

## 2022-08-22 PROCEDURE — 80061 LIPID PANEL: CPT | Performed by: PATHOLOGY

## 2022-08-22 PROCEDURE — 36415 COLL VENOUS BLD VENIPUNCTURE: CPT | Performed by: PATHOLOGY

## 2022-08-22 PROCEDURE — 82248 BILIRUBIN DIRECT: CPT | Performed by: PATHOLOGY

## 2022-08-24 ENCOUNTER — RADIOLOGY INJECTION OFFICE VISIT (OUTPATIENT)
Dept: PHYSICAL MEDICINE AND REHAB | Facility: CLINIC | Age: 57
End: 2022-08-24
Attending: NURSE PRACTITIONER
Payer: COMMERCIAL

## 2022-08-24 VITALS
TEMPERATURE: 98.1 F | HEART RATE: 68 BPM | DIASTOLIC BLOOD PRESSURE: 62 MMHG | SYSTOLIC BLOOD PRESSURE: 124 MMHG | OXYGEN SATURATION: 100 %

## 2022-08-24 DIAGNOSIS — G89.29 CHRONIC BILATERAL LOW BACK PAIN WITH RIGHT-SIDED SCIATICA: ICD-10-CM

## 2022-08-24 DIAGNOSIS — M51.369 DDD (DEGENERATIVE DISC DISEASE), LUMBAR: ICD-10-CM

## 2022-08-24 DIAGNOSIS — M54.16 RIGHT LUMBAR RADICULOPATHY: ICD-10-CM

## 2022-08-24 DIAGNOSIS — M54.41 CHRONIC BILATERAL LOW BACK PAIN WITH RIGHT-SIDED SCIATICA: ICD-10-CM

## 2022-08-24 PROCEDURE — 64483 NJX AA&/STRD TFRM EPI L/S 1: CPT | Mod: RT | Performed by: PHYSICAL MEDICINE & REHABILITATION

## 2022-08-24 PROCEDURE — 64484 NJX AA&/STRD TFRM EPI L/S EA: CPT | Mod: RT | Performed by: PHYSICAL MEDICINE & REHABILITATION

## 2022-08-24 RX ORDER — GABAPENTIN 300 MG/1
CAPSULE ORAL
Qty: 270 CAPSULE | Refills: 0 | Status: SHIPPED | OUTPATIENT
Start: 2022-08-24 | End: 2023-04-11

## 2022-08-24 RX ORDER — LIDOCAINE HYDROCHLORIDE 10 MG/ML
INJECTION, SOLUTION EPIDURAL; INFILTRATION; INTRACAUDAL; PERINEURAL
Status: COMPLETED | OUTPATIENT
Start: 2022-08-24 | End: 2022-08-24

## 2022-08-24 RX ORDER — METHYLPREDNISOLONE ACETATE 80 MG/ML
INJECTION, SUSPENSION INTRA-ARTICULAR; INTRALESIONAL; INTRAMUSCULAR; SOFT TISSUE
Status: COMPLETED | OUTPATIENT
Start: 2022-08-24 | End: 2022-08-24

## 2022-08-24 RX ADMIN — METHYLPREDNISOLONE ACETATE 80 MG: 80 INJECTION, SUSPENSION INTRA-ARTICULAR; INTRALESIONAL; INTRAMUSCULAR; SOFT TISSUE at 16:05

## 2022-08-24 RX ADMIN — LIDOCAINE HYDROCHLORIDE 4 ML: 10 INJECTION, SOLUTION EPIDURAL; INFILTRATION; INTRACAUDAL; PERINEURAL at 16:05

## 2022-08-24 ASSESSMENT — PAIN SCALES - GENERAL
PAINLEVEL: EXTREME PAIN (8)
PAINLEVEL: SEVERE PAIN (7)

## 2022-08-24 NOTE — PATIENT INSTRUCTIONS
Follow-up visit in 1 week with Nina Montoya to discuss injection outcome and determine care plan going forward.     DISCHARGE INSTRUCTIONS    During office hours (8:00 a.m.- 4:00 p.m.) questions or concerns may be answered  by calling Spine Center Navigation Nurses at  [phone].  Messages received after hours will be returned the following business day.      In the case of an emergency, please dial 911 or seek assistance at the nearest Emergency Room/Urgent Care facility.     All Patients:    You may experience an increase in your symptoms for the first 2 days (It may take anywhere between 2 days- 2 weeks for the steroid to have maximum effect).    You may use ice on the injection site, as frequently as 20 minutes each hour if needed.    You may take your pain medicine.    You may continue taking your regular medication after your injection. If you have had a Medial Branch Block you may resume pain medication once your pain diary is completed.    You may shower. No swimming, tub bath or hot tub for 48 hours.  You may remove your bandaid/bandage as soon as you are home.    You may resume light activities, as tolerated.    Resume your usual diet as tolerated.    It is strongly advised that you do not drive for 1-3 hours post injection.    If you have had oral sedation:  Do not drive for 8 hours post injection.      If you have had IV sedation:  Do not drive for 24 hours post injection.  Do not operate hazardous machinery or make important personal/business decisions for 24 hours.      POSSIBLE STEROID SIDE EFFECTS (If steroid/cortisone was used for your procedure)    -If you experience these symptoms, it should only last for a short period    Swelling of the legs              Skin redness (flushing)     Mouth (oral) irritation   Blood sugar (glucose) levels            Sweats                    Mood changes  Headache  Sleeplessness  Weakened immune system for up to 14 days, which could increase the risk of  nicolasa the COVID-19 virus and/or experiencing more severe symptoms of the disease, if exposed.  Decreased effectiveness of the flu vaccine if given within 2 weeks of the steroid.         POSSIBLE PROCEDURE SIDE EFFECTS  -Call the Spine Center if you are concerned  Increased Pain           Increased numbness/tingling      Nausea/Vomiting          Bruising/bleeding at site      Redness or swelling                                              Difficulty walking      Weakness           Fever greater than 100.5    *In the event of a severe headache after an epidural steroid injection that is relieved by lying down, please call the [facility] to speak with a clinical staff member*    Gabapentin (nerve pain medication) was prescribed today.  Please use the chart to increase the dose to an amount that controls your pain (do not exceed 3 tablets three times a day).  If you have any questions on how to increase the dose or any side effects to this medication, please call the clinic.    Gabapentin 300mg Dosing Chart    DATE  MORNING AFTERNOON BEDTIME    Day 1 0 0 1    Day 2 0 0 1    Day 3 0 0 1    Day 4 1 0 1    Day 5 1 0 1    Day 6 1 0 1    Day 7 1 1 1    Day 8 1 1 1    Day 9 1 1 1    Day 10 1 1 2    Day 11 1 1 2    Day 12 1 1 2    Day 13 2 1 2    Day 14 2 1 2    Day 15 2 1 2    Day 16 2 2 2    Day 17 2 2 2    Day 18 2 2 2    Day 19 2 2 3    Day 20 2 2 3    Day 21 2 2 3    Day 22 3 2 3    Day 23 3 2 3    Day 24 3 2 3    Day 25 3 3 3    Day 26 3 3 3    Day 27 3 3 3     Continue medication, taking 3 capsules three times daily    Please call if you have any questions regarding how to take your medication  Clinic Phone # [phone]

## 2022-08-25 DIAGNOSIS — N25.89 RENAL TUBULAR ACIDOSIS: ICD-10-CM

## 2022-08-25 RX ORDER — SODIUM BICARBONATE 650 MG/1
650 TABLET ORAL 2 TIMES DAILY
Qty: 180 TABLET | Refills: 3 | Status: ON HOLD | OUTPATIENT
Start: 2022-08-25 | End: 2023-09-19

## 2022-08-25 NOTE — TELEPHONE ENCOUNTER
Hello,  The patient is requesting a refill on Sodium Bicarbonate 650 mg tablet. Please verify and send a new order to the pharmacy. Thanks!    [facility]  Phone: [phone]  Fax: [phone]

## 2022-08-30 ENCOUNTER — PATIENT OUTREACH (OUTPATIENT)
Dept: GASTROENTEROLOGY | Facility: CLINIC | Age: 57
End: 2022-08-30

## 2022-08-30 ENCOUNTER — VIRTUAL VISIT (OUTPATIENT)
Dept: VASCULAR SURGERY | Facility: CLINIC | Age: 57
End: 2022-08-30
Payer: COMMERCIAL

## 2022-08-30 DIAGNOSIS — Z95.828 S/P TIPS (TRANSJUGULAR INTRAHEPATIC PORTOSYSTEMIC SHUNT): Primary | ICD-10-CM

## 2022-08-30 PROCEDURE — 99213 OFFICE O/P EST LOW 20 MIN: CPT | Mod: TEL | Performed by: RADIOLOGY

## 2022-08-30 NOTE — LETTER
8/30/2022       RE: Ivan Bell  [id number] [address] [address]     Dear Colleague,    Thank you for referring your patient, Ivan Bell, to the [facility] at [facility]. Please see a copy of my visit note below.    [facility]          INTERVENTIONAL RADIOLOGY CLINIC VISIT - FOLLOW UP     Name: Ivan Bell  Age: 56 year old   Referral: Dr. Bales         HPI: Ivan Bell is a 56 year old with history of cirrhosis 2/2 EtOH Who is coming for 4-month follow up post TIPS revision. He is s/p esophageal variceal banding, TIPS placement 6/2018 and multiple TIPS revisions d/t recurrent ascites. He was previously a patient of Dr. Elaine. We extended his stent in March. Since then, he needed only a few paracentesis. He had none since April.    He has no complains. His energy level, appetite and sleep is good.  His only issue is worsening of his right leg swelling. He says he is not on any diuretics however Furosemide is listed in his med list.  I asked him to contact Dr. Bales to evaluate the need for diuretic treatment.    I looked at his imaging and Labs. US shows patency of the stent with good velocities. His Bilirubin is also improving.      Lab Results   Component Value Date    AST 32 08/22/2022    AST 33 06/23/2021     Lab Results   Component Value Date    ALT 19 08/22/2022    ALT 19 06/23/2021     Lab Results   Component Value Date    BILICONJ 0.1 03/30/2011      Lab Results   Component Value Date    BILITOTAL 2.2 08/22/2022    BILITOTAL 3.6 06/23/2021     Lab Results   Component Value Date    ALBUMIN 2.2 08/22/2022    ALBUMIN 2.7 06/23/2021     Lab Results   Component Value Date    PROTTOTAL 5.5 08/22/2022    PROTTOTAL 5.6 06/23/2021      Lab Results   Component Value Date    ALKPHOS 145 08/22/2022    ALKPHOS 165 06/23/2021        Current Outpatient Medications   Medication     acetaminophen (TYLENOL) 500 MG  tablet     Ascorbic Acid (VITAMIN C PO)     eplerenone (INSPRA) 50 MG tablet     fexofenadine (ALLEGRA) 60 MG tablet     furosemide (LASIX) 40 MG tablet     gabapentin (NEURONTIN) 300 MG capsule     lactulose (CHRONULAC) 10 GM/15ML solution     Menaquinone-7 (VITAMIN K2) 100 MCG CAPS     MULTIPLE VITAMINS PO     potassium chloride ER (KLOR-CON M) 20 MEQ CR tablet     rifaximin (XIFAXAN) 550 MG TABS tablet     sertraline (ZOLOFT) 50 MG tablet     sildenafil (REVATIO) 20 MG tablet     sodium bicarbonate 650 MG tablet     vitamin D3 (CHOLECALCIFEROL) 2000 units (50 mcg) tablet     benzonatate (TESSALON) 100 MG capsule     docusate sodium (COLACE) 100 MG tablet     ondansetron (ZOFRAN-ODT) 4 MG ODT tab     oxybutynin ER (DITROPAN XL) 5 MG 24 hr tablet     tamsulosin (FLOMAX) 0.4 MG capsule     No current facility-administered medications for this visit.        Impression and Plan:     Good results post TIPS with no ascites and significant improvement of his QOL.  Patient will return in 6 months with new Labs and US.        Total time: 15 min      Again, thank you for allowing me to participate in the care of your patient.      Sincerely,    Michelle Trinidad MD

## 2022-08-30 NOTE — PROGRESS NOTES
"Attempted to reach patient for check in and review current diuretic medications, no answer, message left requesting call back, number provided.  Patient returned call. He states he made a mistake in the appointment with IR, and he is taking his diuretics as prescribed. His edema has improved, but still some swelling of right lower leg. He elevates as needed, and it improves with rest and elevation. Patient states this right knee has been replaced, and wonders if the swelling is worse in that leg due to this. Patient mentions that he really wants to have his hernia surgically repaired. Discussed that he will need no ascites usually x 6 months for Dr. Bales to approve. Recent imaging shows \"Mild ascites.\" patient requests assistance in scheduling cancelled appointment with Dr. Bales, spot held on 10/12 and sent to scheduling. Patient is aware to have lab work done prior.  "

## 2022-08-30 NOTE — PATIENT INSTRUCTIONS
You were seen today in the Vascular IR Clinic by Dr Trinidad for follow up regarding your TIPS revision procedure.    Plan:    - We will plan to see you back in 6 months for a follow up visit with repeat labs and ultrasound. Some one from our scheduling team will reach out to you to help make these appointments.     Please call or send a MyChart with any questions or concerns.    Tabitha BARRON RN, BSN   Interventional Radiology [facility]   [phone]

## 2022-08-30 NOTE — NURSING NOTE
Chief Complaint   Patient presents with     Follow Up       Patient confirms medications and allergies are accurate via patients echeck in completion, and or denies any changes since last reviewed/verified.     Nancy Simmons, Nik Facilitator    Could not verify all meds states he knew some but wife not home to verify all.   Nik Escalante/[facility], 8/30/2022

## 2022-08-30 NOTE — PROGRESS NOTES
"[facility]      Type of Visit: Virtual: Other: [phone]    Patient is here for a return visit to discuss follow up.     Vitals - Patient Reported  Weight (Patient Reported): 74.8 kg (165 lb)  Height (Patient Reported): 177.8 cm (5' 10\")  BMI (Based on Pt Reported Ht/Wt): 23.67  Pain Score: No Pain (0)      Patient states is currently in the [facility]: Yes      Questions patient would like addressed today are: Patient verbalized no questions/concerns, this has been communicated to the provider.     Refills are needed: No    How would you like to obtain your AVS? Frances Simmons            Telephone Visit: 5 min        INTERVENTIONAL RADIOLOGY CLINIC VISIT - FOLLOW UP     Name: Ivan Bell  Age: 56 year old   Referral: Dr. Bales         HPI: Ivan Bell is a 56 year old with history of cirrhosis 2/2 EtOH Who is coming for 4-month follow up post TIPS revision. He is s/p esophageal variceal banding, TIPS placement 6/2018 and multiple TIPS revisions d/t recurrent ascites. He was previously a patient of Dr. Elaine. We extended his stent in March. Since then, he needed only a few paracentesis. He had none since April.    He has no complains. His energy level, appetite and sleep is good.  His only issue is worsening of his right leg swelling. He says he is not on any diuretics however Furosemide is listed in his med list.  I asked him to contact Dr. Bales to evaluate the need for diuretic treatment.    I looked at his imaging and Labs. US shows patency of the stent with good velocities. His Bilirubin is also improving.      Lab Results   Component Value Date    AST 32 08/22/2022    AST 33 06/23/2021     Lab Results   Component Value Date    ALT 19 08/22/2022    ALT 19 06/23/2021     Lab Results   Component Value Date    BILICONJ 0.1 03/30/2011      Lab Results   Component Value Date    BILITOTAL 2.2 08/22/2022    BILITOTAL 3.6 06/23/2021     Lab Results   Component Value Date    " ALBUMIN 2.2 08/22/2022    ALBUMIN 2.7 06/23/2021     Lab Results   Component Value Date    PROTTOTAL 5.5 08/22/2022    PROTTOTAL 5.6 06/23/2021      Lab Results   Component Value Date    ALKPHOS 145 08/22/2022    ALKPHOS 165 06/23/2021        Current Outpatient Medications   Medication     acetaminophen (TYLENOL) 500 MG tablet     Ascorbic Acid (VITAMIN C PO)     eplerenone (INSPRA) 50 MG tablet     fexofenadine (ALLEGRA) 60 MG tablet     furosemide (LASIX) 40 MG tablet     gabapentin (NEURONTIN) 300 MG capsule     lactulose (CHRONULAC) 10 GM/15ML solution     Menaquinone-7 (VITAMIN K2) 100 MCG CAPS     MULTIPLE VITAMINS PO     potassium chloride ER (KLOR-CON M) 20 MEQ CR tablet     rifaximin (XIFAXAN) 550 MG TABS tablet     sertraline (ZOLOFT) 50 MG tablet     sildenafil (REVATIO) 20 MG tablet     sodium bicarbonate 650 MG tablet     vitamin D3 (CHOLECALCIFEROL) 2000 units (50 mcg) tablet     benzonatate (TESSALON) 100 MG capsule     docusate sodium (COLACE) 100 MG tablet     ondansetron (ZOFRAN-ODT) 4 MG ODT tab     oxybutynin ER (DITROPAN XL) 5 MG 24 hr tablet     tamsulosin (FLOMAX) 0.4 MG capsule     No current facility-administered medications for this visit.        Impression and Plan:     Good results post TIPS with no ascites and significant improvement of his QOL.  Patient will return in 6 months with new Labs and US.        Total time: 15 min

## 2022-08-31 ENCOUNTER — TELEPHONE (OUTPATIENT)
Dept: GASTROENTEROLOGY | Facility: CLINIC | Age: 57
End: 2022-08-31

## 2022-09-07 ENCOUNTER — TELEPHONE (OUTPATIENT)
Dept: GASTROENTEROLOGY | Facility: CLINIC | Age: 57
End: 2022-09-07

## 2022-09-07 DIAGNOSIS — E87.6 HYPOKALEMIA: ICD-10-CM

## 2022-09-07 DIAGNOSIS — K70.31 ALCOHOLIC CIRRHOSIS OF LIVER WITH ASCITES (H): ICD-10-CM

## 2022-09-07 RX ORDER — POTASSIUM CHLORIDE 1500 MG/1
40 TABLET, EXTENDED RELEASE ORAL DAILY
Qty: 120 TABLET | Refills: 2 | Status: SHIPPED | OUTPATIENT
Start: 2022-09-07 | End: 2022-10-14

## 2022-09-07 RX ORDER — FUROSEMIDE 40 MG
TABLET ORAL
Qty: 135 TABLET | Refills: 3 | Status: SHIPPED | OUTPATIENT
Start: 2022-09-07 | End: 2022-10-03

## 2022-09-07 NOTE — PROGRESS NOTES
Unable to reach patient to review upcoming scheduled appointment. Called and spoke to spouse Elicia and reviewed appointment. Elicia requests refill for potassium and for furosemide, refills sent.

## 2022-09-15 ENCOUNTER — PATIENT OUTREACH (OUTPATIENT)
Dept: GASTROENTEROLOGY | Facility: CLINIC | Age: 57
End: 2022-09-15

## 2022-09-15 DIAGNOSIS — K70.31 ALCOHOLIC CIRRHOSIS OF LIVER WITH ASCITES (H): Primary | ICD-10-CM

## 2022-09-15 NOTE — PROGRESS NOTES
Patient has been having urinary frequency for the past 3 days. He denies urgency, burning, fowl smell or abnormal color of urine, fever or chills. He states for the most part he has been eating and drinking as usual, though likely somewhat less food intake due to long work hours. Patient reports no increase in fluid retention or abdominal swelling. He states that if on his feet for many hours, his right LE swells. However it goes down once he is home and elevating it. Patient is wondering if he should decrease diuretics. He is currently at the prescribed doses of eplerenone 100 mg twice per day and furosemide 40 mg AM and 20 mg PM.   Per Dr. Bales, patient may titrate his diuretics as needed for frequent urination vs increased swelling. Message left with this information, call back encouraged to further discuss.

## 2022-10-03 DIAGNOSIS — K70.31 ALCOHOLIC CIRRHOSIS OF LIVER WITH ASCITES (H): ICD-10-CM

## 2022-10-03 RX ORDER — FUROSEMIDE 20 MG
20 TABLET ORAL EVERY EVENING
Qty: 90 TABLET | Refills: 3 | Status: SHIPPED | OUTPATIENT
Start: 2022-10-03 | End: 2023-12-26

## 2022-10-03 RX ORDER — FUROSEMIDE 40 MG
40 TABLET ORAL EVERY MORNING
Qty: 90 TABLET | Refills: 3 | Status: SHIPPED | OUTPATIENT
Start: 2022-10-03

## 2022-10-12 ENCOUNTER — LAB (OUTPATIENT)
Dept: LAB | Facility: CLINIC | Age: 57
End: 2022-10-12
Payer: COMMERCIAL

## 2022-10-12 ENCOUNTER — OFFICE VISIT (OUTPATIENT)
Dept: GASTROENTEROLOGY | Facility: CLINIC | Age: 57
End: 2022-10-12
Attending: INTERNAL MEDICINE
Payer: COMMERCIAL

## 2022-10-12 ENCOUNTER — TELEPHONE (OUTPATIENT)
Dept: SURGERY | Facility: CLINIC | Age: 57
End: 2022-10-12

## 2022-10-12 VITALS
BODY MASS INDEX: 25.4 KG/M2 | SYSTOLIC BLOOD PRESSURE: 149 MMHG | DIASTOLIC BLOOD PRESSURE: 73 MMHG | HEART RATE: 78 BPM | WEIGHT: 177 LBS | OXYGEN SATURATION: 99 %

## 2022-10-12 DIAGNOSIS — K70.31 ALCOHOLIC CIRRHOSIS OF LIVER WITH ASCITES (H): ICD-10-CM

## 2022-10-12 DIAGNOSIS — K70.31 ALCOHOLIC CIRRHOSIS OF LIVER WITH ASCITES (H): Primary | ICD-10-CM

## 2022-10-12 LAB
ALBUMIN SERPL BCG-MCNC: 2.9 G/DL (ref 3.5–5.2)
ALP SERPL-CCNC: 152 U/L (ref 40–129)
ALT SERPL W P-5'-P-CCNC: 7 U/L (ref 10–50)
ANION GAP SERPL CALCULATED.3IONS-SCNC: 7 MMOL/L (ref 7–15)
AST SERPL W P-5'-P-CCNC: 34 U/L (ref 10–50)
BILIRUB DIRECT SERPL-MCNC: 0.63 MG/DL (ref 0–0.3)
BILIRUB SERPL-MCNC: 1.6 MG/DL
BUN SERPL-MCNC: 14.9 MG/DL (ref 8–23)
CALCIUM SERPL-MCNC: 9 MG/DL (ref 8.6–10)
CHLORIDE SERPL-SCNC: 111 MMOL/L (ref 98–107)
CREAT SERPL-MCNC: 1.47 MG/DL (ref 0.67–1.17)
DEPRECATED HCO3 PLAS-SCNC: 25 MMOL/L (ref 22–29)
ERYTHROCYTE [DISTWIDTH] IN BLOOD BY AUTOMATED COUNT: 18.7 % (ref 10–15)
GFR SERPL CREATININE-BSD FRML MDRD: 55 ML/MIN/1.73M2
GLUCOSE SERPL-MCNC: 82 MG/DL (ref 70–99)
HCT VFR BLD AUTO: 34.4 % (ref 40–53)
HGB BLD-MCNC: 11.1 G/DL (ref 13.3–17.7)
INR PPP: 1.63 (ref 0.85–1.15)
MCH RBC QN AUTO: 28.8 PG (ref 26.5–33)
MCHC RBC AUTO-ENTMCNC: 32.3 G/DL (ref 31.5–36.5)
MCV RBC AUTO: 89 FL (ref 78–100)
PLATELET # BLD AUTO: 78 10E3/UL (ref 150–450)
POTASSIUM SERPL-SCNC: 3.7 MMOL/L (ref 3.4–5.3)
PROT SERPL-MCNC: 5.6 G/DL (ref 6.4–8.3)
RBC # BLD AUTO: 3.86 10E6/UL (ref 4.4–5.9)
SODIUM SERPL-SCNC: 143 MMOL/L (ref 136–145)
WBC # BLD AUTO: 4 10E3/UL (ref 4–11)

## 2022-10-12 PROCEDURE — 82248 BILIRUBIN DIRECT: CPT | Performed by: PATHOLOGY

## 2022-10-12 PROCEDURE — 85027 COMPLETE CBC AUTOMATED: CPT | Performed by: PATHOLOGY

## 2022-10-12 PROCEDURE — 80053 COMPREHEN METABOLIC PANEL: CPT | Performed by: PATHOLOGY

## 2022-10-12 PROCEDURE — 99214 OFFICE O/P EST MOD 30 MIN: CPT | Performed by: INTERNAL MEDICINE

## 2022-10-12 PROCEDURE — G0463 HOSPITAL OUTPT CLINIC VISIT: HCPCS

## 2022-10-12 PROCEDURE — 36415 COLL VENOUS BLD VENIPUNCTURE: CPT | Performed by: PATHOLOGY

## 2022-10-12 PROCEDURE — 85610 PROTHROMBIN TIME: CPT | Performed by: PATHOLOGY

## 2022-10-12 NOTE — TELEPHONE ENCOUNTER
Patient seen by Dr. Bales this morning and determined he does not need a transplant evaluation at this time.        From: Gonzalo Wahl MD  Sent: 10/12/2022   9:15 AM CDT  To: Tommy Romo Jr., RN  Subject: RE: clinic visit tomorrow                        He is not interested and I don't think he needs it anymore so we can close referral.    Thanks    adebayo

## 2022-10-12 NOTE — PROGRESS NOTES
[facility] Hepatology    Follow-up Visit    Follow-up visit for cirrhosis    Subjective:  57 year old male    Cirrhosis  - ETOH  - hx ascites, TIPS placement 5/14/18, 6/6/18; TIPS revision 10/29/19, 11/25/2020, 8/11/2021, 1/28/2022  - hx HE  - hx variceal bleed Oct 2017  - ETOH hepatitis March 2019  - last EGD Jun 2019- diminutive EV, mild portal hypertensive gastropathy  - HCC screening- liver TIPS doppler U/S Aug 2022    Last visit 06/2022.  The patient underwent chemical dependency assessment 06/15/2022 which recommended attending psychotherapy sessions.  The patient was unable to attend these because of work commitments.      The patient had a cystoscopy with stone removal with a followup cystoscopy for bleeding, both in 07/2022.  He presented to his local ER and 09/2022 with a right finger abscess, which was treated with antibiotics.    The patient is well today.  His main complaint today is lower back pain related to physical exertion.  His hematuria is fully resolved.        The patient denies jaundice, lower extremity edema, abdominal distention, lethargy or confusion.    The patient denies melena, hematemesis or hematochezia.    The patient denies fever, sweats or chills.  He has not had COVID-19 infection since last visit.  He is fully vaccinated against COVID-19.    Weight is stable.  Appetite is good.    The patient has not drank alcohol for several years.  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  His wife is out of town this week attending a sugar conference.    Medical hx Surgical hx   Past Medical History:   Diagnosis Date     Alcoholic cirrhosis of liver (H)      Alcoholic hepatitis 03/2019     Chronic kidney disease     CRF     Depression      Gout      History of transfusion      Hypertension      Hypertriglyceridemia      Left calcaneus fracture 01/09/2006 January 16, 2006: Fell 10 feet from ladder onto left foot on  frozen ground on 1/9/06 at home.  Immediate pain and unable to walk- seen at [place] and diagnosed with calcaneus fracture     Nephrolithiasis      Osteoarthritis      Portal vein thrombosis     left occlusion, partial main     Thrombocytopenia (H)       Past Surgical History:   Procedure Laterality Date     ANKLE SURGERY Left      ANKLE SURGERY       ARTHROSCOPY KNEE       COLONOSCOPY N/A 03/31/2016    Procedure: COLONOSCOPY;  Surgeon: Rhys Uriostegui MD;  Location:  GI     COMBINED CYSTOSCOPY, RETROGRADES, URETEROSCOPY, LASER HOLMIUM LITHOTRIPSY URETER(S), INSERT STENT Bilateral 7/1/2022    Procedure: CYSTOSCOPY, RIGHT RETROGRADE PYELOGRAM, RIGHT URETEROSCOPY WITH LASER LITHOTRIPSY AND BASKET REMOVAL OF STONE, RIGHT URETERAL STENT PLACEMENT, LEFT RETROGRADE PYELOGRAM, LEFT URETEROSCOPY WITH LASER LITHOTRIPSY AND BASKET REMOVAL OF STONE, LEFT URETERAL STENT PLACEMENT;  Surgeon: Dylan Galaviz MD;  Location:  OR     COMBINED CYSTOSCOPY, RETROGRADES, URETEROSCOPY, LASER HOLMIUM LITHOTRIPSY URETER(S), INSERT STENT Bilateral 7/27/2022    Procedure: CYSTOSCOPY, BILATERAL URETERAL STENT REMOVAL, BILATERAL  RETROGRADE PYELOGRAM, BILATERAL URETEROSCOPY. HOLMIUM LASER LITHOTRIPSY LEFT SIDE.  AND BASKET REMOVAL OF STONES BILATERAL, LEFT  URETERAL STENT PLACEMENT;  Surgeon: Dylan Galaviz MD;  Location:  OR     ESOPHAGOSCOPY, GASTROSCOPY, DUODENOSCOPY (EGD), COMBINED N/A 03/31/2016    Procedure: COMBINED ESOPHAGOSCOPY, GASTROSCOPY, DUODENOSCOPY (EGD);  Surgeon: Rhys Uriostegui MD;  Location:  GI     ESOPHAGOSCOPY, GASTROSCOPY, DUODENOSCOPY (EGD), COMBINED N/A 03/09/2018    Procedure: COMBINED ESOPHAGOSCOPY, GASTROSCOPY, DUODENOSCOPY (EGD), BIOPSY SINGLE OR MULTIPLE;  EGD;  Surgeon: Gonzalo Wahl MD;  Location:  GI     ESOPHAGOSCOPY, GASTROSCOPY, DUODENOSCOPY (EGD), COMBINED N/A 06/07/2019    Procedure: ESOPHAGOGASTRODUODENOSCOPY (EGD);  Surgeon: Gonzalo Wahl MD;   Location: [facility]     FOOT SURGERY       IR PARACENTESIS  10/29/2019     IR PARACENTESIS  11/25/2020     IR PARACENTESIS  08/11/2021     IR PARACENTESIS  01/28/2022     IR PARACENTESIS  03/30/2022     IR TRANSVEN INTRAHEPATIC PORTOSYST REV  10/29/2019     IR TRANSVEN INTRAHEPATIC PORTOSYST REV  11/25/2020     IR TRANSVEN INTRAHEPATIC PORTOSYST REV  08/11/2021     IR TRANSVEN INTRAHEPATIC PORTOSYST REV  01/28/2022     IR TRANSVEN INTRAHEPATIC PORTOSYST REV  03/30/2022     KNEE SURGERY Left      KNEE SURGERY Right      RELEASE CARPAL TUNNEL       RELEASE TRIGGER FINGER Right 06/11/2020    Procedure: RELEASE, TRIGGER FINGER, right ring and long finger;  Surgeon: Pascual Valencia MD;  Location: [facility] OR     SIGMOIDOSCOPY FLEXIBLE N/A 10/31/2017    Procedure: SIGMOIDOSCOPY FLEXIBLE;;  Surgeon: Armaan Adams MD;  Location:  GI     TIPS Procedure  06/06/2018     TIPS PROCEDURE  11/01/2020     ZZC PLASTY KNEE,MED OR LAT COMPARTMT Right 02/19/2021    Procedure: RIGHT UNICOMPARTMENTAL ARTHROPLASTY KNEE MEDIAL;  Surgeon: José Miguel Landaverde MD;  Location: [facility];  Service: Orthopedics          Medications  Prior to Admission medications    Medication Sig Start Date End Date Taking? Authorizing Provider   acetaminophen (TYLENOL) 500 MG tablet Take 1,000 mg by mouth every 6 hours as needed for mild pain    Yes Reported, Patient   Ascorbic Acid (VITAMIN C PO) Take by mouth daily   Yes Reported, Patient   eplerenone (INSPRA) 50 MG tablet Take 2 tablets (100 mg) by mouth 2 times daily  Patient taking differently: Take 100 mg by mouth 2 times daily Patient taking as ordered as of 8/16/22. 3/7/22  Yes Gonzalo Wahl MD   fexofenadine (ALLEGRA) 60 MG tablet Take 1 tablet (60 mg) by mouth daily 4/17/20  Yes Citlali Morgan PA-C   furosemide (LASIX) 20 MG tablet Take 1 tablet (20 mg) by mouth every evening 10/3/22  Yes Gonzalo Wahl MD   furosemide (LASIX) 40 MG tablet Take 1 tablet (40 mg) by mouth  every morning 10/3/22  Yes Gonzalo Wahl MD   gabapentin (NEURONTIN) 300 MG capsule Increase as directed to a maximum of 900 mg TID 8/24/22  Yes Elena Sanders,    lactulose (CHRONULAC) 10 GM/15ML solution TAKE 30MLS BY MOUTH THREE TIMES DAILY AS NEEDED FOR CONSTIPATION (TAKE AS NEEDED TO MAINTAIN 3 TO 4 BOWEL MOVEMENTS DAILY) 10/14/21  Yes Gonzalo Wahl MD   Menaquinone-7 (VITAMIN K2) 100 MCG CAPS Take 200 mcg by mouth daily    Yes Reported, Patient   MULTIPLE VITAMINS PO Take 1 tablet by mouth daily   Yes Reported, Patient   potassium chloride ER (KLOR-CON M) 20 MEQ CR tablet Take 2 tablets (40 mEq) by mouth daily 9/7/22  Yes Celestino Verduzco PA-C   rifaximin (XIFAXAN) 550 MG TABS tablet Take 1 tablet (550 mg) by mouth 2 times daily 10/20/21  Yes Celestino Verduzco PA-C   sertraline (ZOLOFT) 50 MG tablet TAKE ONE-HALF TABLET BY MOUTH EVERY DAY 3/7/22  Yes Gonzalo Wahl MD   sildenafil (REVATIO) 20 MG tablet Take 3-5 tabs 1/2-4 hours to relations 6/7/22  Yes Abena Acuña MD   sodium bicarbonate 650 MG tablet Take 1 tablet (650 mg) by mouth 2 times daily 8/25/22  Yes Alma Moses MD   vitamin D3 (CHOLECALCIFEROL) 2000 units (50 mcg) tablet Take 1 tablet by mouth daily   Yes Unknown, Entered By History   benzonatate (TESSALON) 100 MG capsule Take 1 capsule (100 mg) by mouth 3 times daily as needed for cough  Patient not taking: No sig reported 3/17/22   Precious Billingsley PA-C   docusate sodium (COLACE) 100 MG tablet Take 1 tablet (100 mg) by mouth daily  Patient not taking: No sig reported 7/1/22   Dylan Galaviz MD   ondansetron (ZOFRAN-ODT) 4 MG ODT tab Take 1 tablet (4 mg) by mouth every 8 hours as needed for nausea  Patient not taking: No sig reported 2/3/22   Jenifer Forde APRN CNP   oxybutynin ER (DITROPAN XL) 5 MG 24 hr tablet Take 1 tablet (5 mg) by mouth daily Take daily until your stent is removed.  Patient not taking:  No sig reported 7/1/22   Dylan Galaviz MD   tamsulosin (FLOMAX) 0.4 MG capsule Take 1 capsule (0.4 mg) by mouth daily Take daily until your stent is removed.  Patient not taking: No sig reported 7/1/22   Dylan Galaviz MD       Allergies  Allergies   Allergen Reactions     Prednisone Visual Disturbance     Trazodone Visual Disturbance     Benadryl [Diphenhydramine] Other (See Comments)     Delirium (visual and auditory hallucinations)       Review of systems  A 10-point review of systems was negative    Examination  BP (!) 149/73 (BP Location: Right arm, Patient Position: Sitting, Cuff Size: Adult Regular)   Pulse 78   Wt 80.3 kg (177 lb)   SpO2 99%   BMI 25.40 kg/m    Body mass index is 25.4 kg/m .    Gen- well, NAD, A+Ox3, normal color  CVS- RRR  RS- CTA  Abd- SNT, palpable liver edge, small reducible umbilical hernia (stable), no ascites or splenomegaly on palpation or percussion, BS+  Extr- hands normal, no BESSY  Skin- no rash or jaundice  Neuro- no asterixis  Psych- normal mood    Laboratory  Lab Results   Component Value Date     10/12/2022     06/23/2021    POTASSIUM 3.7 10/12/2022    POTASSIUM 3.9 08/22/2022    POTASSIUM 4.0 06/23/2021    CHLORIDE 111 10/12/2022    CHLORIDE 116 08/22/2022    CHLORIDE 117 06/23/2021    CO2 25 10/12/2022    CO2 23 08/22/2022    CO2 25 06/23/2021    BUN 14.9 10/12/2022    BUN 12 08/22/2022    BUN 8 06/23/2021    CR 1.47 10/12/2022    CR 1.09 06/23/2021       Lab Results   Component Value Date    BILITOTAL 1.6 10/12/2022    BILITOTAL 3.6 06/23/2021    ALT 7 10/12/2022    ALT 19 06/23/2021    AST 34 10/12/2022    AST 33 06/23/2021    ALKPHOS 152 10/12/2022    ALKPHOS 165 06/23/2021       Lab Results   Component Value Date    ALBUMIN 2.9 10/12/2022    ALBUMIN 2.2 08/22/2022    ALBUMIN 2.7 06/23/2021    PROTTOTAL 5.6 10/12/2022    PROTTOTAL 5.6 06/23/2021        Lab Results   Component Value Date    WBC 4.0 10/12/2022    WBC 4.3 06/23/2021    HGB 11.1 10/12/2022     HGB 12.1 06/23/2021    MCV 89 10/12/2022    MCV 99 06/23/2021    PLT 78 10/12/2022    PLT 58 06/23/2021       Lab Results   Component Value Date    INR 1.63 10/12/2022    INR 1.79 06/23/2021       Radiology  Liver TIPS doppler U/S  Aug 2022 personally reviewed- no mass, small ascites    Assessment  A 57-year-old male who presents for followup of decompensated alcohol-related cirrhosis complicated with ascites and hepatic encephalopathy.  MELD-Na = 17 (stable).  Last ETOH = 2017.  Ascites is well-controlled on current diuretics and TIPS.  Hepatic encephalopathy is also well controlled on current medical therapy.  Liver function overall is stable and patient is not fully interested in pursuing liver transplant will close LT referral.  Up to date with HCC screening.    Plan  1.  Close LT referral  2.  Low Na diet  3.  Continue diuretics at current doses  4.  Continue lactulose and rifaximin  5.  Follow-up in 6 months    Gonzalo Bales MD  Hepatology  [facility]

## 2022-10-12 NOTE — NURSING NOTE
Chief Complaint   Patient presents with     RECHECK     Cirrhosis     Blood pressure (!) 149/73, pulse 78, weight 80.3 kg (177 lb), SpO2 99 %.    Rip Shetty on 10/12/2022 at 8:43 AM

## 2022-10-13 NOTE — TELEPHONE ENCOUNTER
I have been unsuccessful in my efforts to contact the pt by phone and [place] to schedule the requested appointments.  I am sending out a letter to the pt today.  Everett Cuevas on 10/12/2022 at 7:57 PM     LETICIA & Frances Cuevas on 10/3/2022 at 2:34 PM   LETICIA Cuevas on 10/10/2022 at 11:40 AM

## 2022-10-13 NOTE — TELEPHONE ENCOUNTER
----- Message from Tabitha Mays RN sent at 8/30/2022  1:37 PM CDT -----  Hi,     Can you please call this patient and schedule him for the ordered labs and ultrasound. And then a follow up video visit s/p tips revision with Dr Trinidad. Due in 6 months. Thank you!!    Tabitha BARRON RN, BSN   Interventional Radiology [facility]   [phone]

## 2022-10-14 ENCOUNTER — OFFICE VISIT (OUTPATIENT)
Dept: PHYSICAL MEDICINE AND REHAB | Facility: CLINIC | Age: 57
End: 2022-10-14
Payer: COMMERCIAL

## 2022-10-14 VITALS
SYSTOLIC BLOOD PRESSURE: 163 MMHG | HEART RATE: 79 BPM | WEIGHT: 177 LBS | BODY MASS INDEX: 25.34 KG/M2 | HEIGHT: 70 IN | DIASTOLIC BLOOD PRESSURE: 74 MMHG

## 2022-10-14 DIAGNOSIS — M47.816 LUMBAR FACET ARTHROPATHY: ICD-10-CM

## 2022-10-14 DIAGNOSIS — M62.830 LUMBAR PARASPINAL MUSCLE SPASM: ICD-10-CM

## 2022-10-14 DIAGNOSIS — M54.50 LUMBAR SPINE PAIN: Primary | ICD-10-CM

## 2022-10-14 DIAGNOSIS — E87.6 HYPOKALEMIA: ICD-10-CM

## 2022-10-14 PROCEDURE — 99214 OFFICE O/P EST MOD 30 MIN: CPT | Performed by: PHYSICIAN ASSISTANT

## 2022-10-14 RX ORDER — POTASSIUM CHLORIDE 1500 MG/1
40 TABLET, EXTENDED RELEASE ORAL DAILY
Qty: 120 TABLET | Refills: 2 | Status: SHIPPED | OUTPATIENT
Start: 2022-10-14 | End: 2023-10-12

## 2022-10-14 RX ORDER — BACLOFEN 10 MG/1
10 TABLET ORAL 3 TIMES DAILY PRN
Qty: 60 TABLET | Refills: 1 | Status: SHIPPED | OUTPATIENT
Start: 2022-10-14 | End: 2023-04-11

## 2022-10-14 RX ORDER — METHYLPREDNISOLONE 4 MG
TABLET, DOSE PACK ORAL
Qty: 21 TABLET | Refills: 0 | Status: SHIPPED | OUTPATIENT
Start: 2022-10-14 | End: 2022-10-31

## 2022-10-14 ASSESSMENT — PAIN SCALES - GENERAL: PAINLEVEL: EXTREME PAIN (8)

## 2022-10-14 NOTE — LETTER
10/14/2022         RE: Ivan Bell  [address] [facility] [address]        Dear Colleague,    Thank you for referring your patient, Ivan Bell, to the [facility]ERY. Please see a copy of my visit note below.    Assessment:   Ivan Bell is a 57 year old y.o. male with past medical history significant for alcoholic cirrhosis of the liver with ascites (sober 5 years), hypertriglyceridemia, hypertension, gouty arthropathy, nephrolithiasis, chronic kidney disease, thrombocytopenia who presents today for follow-up regarding chronic bilateral low back pain with intermittent radiation into the right lower extremity with numbness and tingling.  My review of an MRI lumbar spine shows a left L4-5 paracentral disc herniation.  He does not have any left lower extremity radicular symptoms patient is following up after right L4-5 and L5-S1 transforaminal epidural steroid injections August 24, 2022 which only provided 7 to 8% relief of his pain for 2 weeks.  Over the past 3 to 4 weeks he has been experiencing muscle spasms.  Suspect he is more symptomatic from lumbar facet arthropathy.  He is reproduction of pain with lumbar facet loading maneuvers.    PSP: Nina Montoya CNP  Plan:     A shared decision making plan was used.  The patient's values and choices were respected.  The following represents what was discussed and decided upon by the physician assistant and the patient.      1.  DIAGNOSTIC TESTS: I reviewed the MRI lumbar spine from 2021.  Patient reports that his pain is the exact same pain as he had in June 2021, at the time of the MRI scan.  No further diagnostic tests were ordered.    2.  PHYSICAL THERAPY: Patient completed a course of [facility] about 1 month ago.  He does his home exercises.  No further physical therapy was ordered.    3.  MEDICATIONS:    -I prescribed a Medrol Dosepak for the patient.  - I prescribed baclofen 10 mg 3 times daily.       4.  INTERVENTIONS: No interventions were ordered today.  I am going to check with his insurance to see if they will authorize facet joint injections.  If yes, I recommend bilateral L4-5, L5-S1 facet joint injections.  If not, I recommend bilateral L3, L4, L5 medial branch blocks as a work-up for radiofrequency ablation.    5.  PATIENT EDUCATION: Patient is in agreement the above plan.  All questions were answered.    6.  FOLLOW-UP: A nurse will call the patient with an update after we hear back from the insurance team.  If he has questions or concerns in the meantime, he should not hesitate to call.   Subjective:     Ivan Bell is a 57 year old male who presents today for follow-up regarding chronic bilateral low back pain with intermittent radiation into the right lower extremity with associated numbness and tingling.  This is a patient of Nina Montoya CNP.  Patient had a right L4-5, L5-S1 transforaminal epidural steroid injection on 524, 2022.  Patient reports that injection only provided 7 to 8% relief of his pain for 2 weeks.  After that same pain returned and for the past 3 to 4 weeks pain has been more severe with spasms.  He denies any injury.    Patient complains of bilateral low back pain.  Pain spans across low back in a broadband distribution at the belt line.  He has intermittent pain in the right buttock, lateral thigh, and knee, but he feels some of his pain is related to his right knee which has been replaced.  He has numbness and tingling in the same distribution as his pain.  He states his back pain is much worse than his leg pain.  He denies weakness.  Denies loss of bowel or bladder control.  Denies recent fevers, chills, sweats.  Patient rates his pain today as an 8 or 10.  At its worst it is a 9 out of 10.  At its best it is a 5-10.  Pain is aggravated with moving and working.  Pain is alleviated with lying down.    Patient completed physical therapy through [facility]  about 1 month ago.  He still does his home exercises.  He is taking Tylenol as needed which is somewhat helpful.  He never tried gabapentin because he was concerned about possible side effects.    Review of Systems:  Positive for numbness/tingling, trip/stumble/falls.  Negative for weakness, loss of bowel/bladder control and inability to urinate, headache, pain much worse in neck, difficulty swallowing, difficulty with hand skills, fevers, unintentional weight loss.     Objective:   CONSTITUTIONAL:  Vital signs as above.  Patient appears to be in significant pain.  The patient is well nourished and well groomed.    PSYCHIATRIC:  The patient is awake, alert, oriented to person, place and time.  The patient is answering questions appropriately with clear speech.  Normal affect.  HEENT: Normocephalic, atraumatic.  Sclera clear.    SKIN:  Skin over the face, posterior torso, bilateral upper and lower extremities is clean, dry, intact without rashes.  VASCULAR: No significant lower extremity edema.  MUSCULOSKELETAL:  Gait is guarded.  He transitions from seated to standing with difficulty.  The patient is able to heel and toe walk bilaterally although he reports it is painful.  Significant tenderness over the bilateral lower lumbar paraspinal muscles with palpable muscle spasm.  Lumbar flexion severely restricted.  Lumbar extension severely restricted.  Lumbar facet loading maneuvers increased low back pain bilaterally.      The patient has 5/5 strength for the bilateral hip flexors, knee flexors/extensors, ankle dorsiflexors/plantar flexors, ankle evertors/invertors.    NEUROLOGICAL: 2+ patellar, achilles reflexes which are symmetric bilaterally.  No ankle clonus bilaterally.  Sensation to light touch is intact in the bilateral L4, L5, and S1 dermatomes.       RESULTS: I reviewed the MRI lumbar spine from [place] dated June 2, 2021.  At L4-5 there is a left paracentral disc protrusion with left lateral recess stenosis  and mild bilateral foraminal stenosis.  There is also moderate bilateral foraminal stenosis at L5-S1.  There is moderate facet arthropathy L5-S1 and mild facet arthropathy L4-5.          Again, thank you for allowing me to participate in the care of your patient.        Sincerely,        Zuleyma Murphy PA-C

## 2022-10-14 NOTE — TELEPHONE ENCOUNTER
Hello,  The patient is requesting a refill on Potassium Chloride Crystal ER 20 tbr. Please verify and send a new order to the pharmacy. Thanks!    Padmini Vo CPhT  [facility]  Phone: [phone]  Fax: [phone]

## 2022-10-14 NOTE — PROGRESS NOTES
Assessment:   Ivan Bell is a 57 year old y.o. male with past medical history significant for alcoholic cirrhosis of the liver with ascites (sober 5 years), hypertriglyceridemia, hypertension, gouty arthropathy, nephrolithiasis, chronic kidney disease, thrombocytopenia who presents today for follow-up regarding chronic bilateral low back pain with intermittent radiation into the right lower extremity with numbness and tingling.  My review of an MRI lumbar spine shows a left L4-5 paracentral disc herniation.  He does not have any left lower extremity radicular symptoms patient is following up after right L4-5 and L5-S1 transforaminal epidural steroid injections August 24, 2022 which only provided 7 to 8% relief of his pain for 2 weeks.  Over the past 3 to 4 weeks he has been experiencing muscle spasms.  Suspect he is more symptomatic from lumbar facet arthropathy.  He is reproduction of pain with lumbar facet loading maneuvers.    PSP: Nina Montoya CNP  Plan:     A shared decision making plan was used.  The patient's values and choices were respected.  The following represents what was discussed and decided upon by the physician assistant and the patient.      1.  DIAGNOSTIC TESTS: I reviewed the MRI lumbar spine from 2021.  Patient reports that his pain is the exact same pain as he had in June 2021, at the time of the MRI scan.  No further diagnostic tests were ordered.    2.  PHYSICAL THERAPY: Patient completed a course of [facility] about 1 month ago.  He does his home exercises.  No further physical therapy was ordered.    3.  MEDICATIONS:    -I prescribed a Medrol Dosepak for the patient.  - I prescribed baclofen 10 mg 3 times daily.      4.  INTERVENTIONS: No interventions were ordered today.  I am going to check with his insurance to see if they will authorize facet joint injections.  If yes, I recommend bilateral L4-5, L5-S1 facet joint injections.  If not, I recommend bilateral L3, L4, L5  medial branch blocks as a work-up for radiofrequency ablation.    5.  PATIENT EDUCATION: Patient is in agreement the above plan.  All questions were answered.    6.  FOLLOW-UP: A nurse will call the patient with an update after we hear back from the insurance team.  If he has questions or concerns in the meantime, he should not hesitate to call.   Subjective:     Ivan Bell is a 57 year old male who presents today for follow-up regarding chronic bilateral low back pain with intermittent radiation into the right lower extremity with associated numbness and tingling.  This is a patient of Nina Montoya CNP.  Patient had a right L4-5, L5-S1 transforaminal epidural steroid injection on 524, 2022.  Patient reports that injection only provided 7 to 8% relief of his pain for 2 weeks.  After that same pain returned and for the past 3 to 4 weeks pain has been more severe with spasms.  He denies any injury.    Patient complains of bilateral low back pain.  Pain spans across low back in a broadband distribution at the belt line.  He has intermittent pain in the right buttock, lateral thigh, and knee, but he feels some of his pain is related to his right knee which has been replaced.  He has numbness and tingling in the same distribution as his pain.  He states his back pain is much worse than his leg pain.  He denies weakness.  Denies loss of bowel or bladder control.  Denies recent fevers, chills, sweats.  Patient rates his pain today as an 8 or 10.  At its worst it is a 9 out of 10.  At its best it is a 5-10.  Pain is aggravated with moving and working.  Pain is alleviated with lying down.    Patient completed physical therapy through [facility] about 1 month ago.  He still does his home exercises.  He is taking Tylenol as needed which is somewhat helpful.  He never tried gabapentin because he was concerned about possible side effects.    Review of Systems:  Positive for numbness/tingling,  trip/stumble/falls.  Negative for weakness, loss of bowel/bladder control and inability to urinate, headache, pain much worse in neck, difficulty swallowing, difficulty with hand skills, fevers, unintentional weight loss.     Objective:   CONSTITUTIONAL:  Vital signs as above.  Patient appears to be in significant pain.  The patient is well nourished and well groomed.    PSYCHIATRIC:  The patient is awake, alert, oriented to person, place and time.  The patient is answering questions appropriately with clear speech.  Normal affect.  HEENT: Normocephalic, atraumatic.  Sclera clear.    SKIN:  Skin over the face, posterior torso, bilateral upper and lower extremities is clean, dry, intact without rashes.  VASCULAR: No significant lower extremity edema.  MUSCULOSKELETAL:  Gait is guarded.  He transitions from seated to standing with difficulty.  The patient is able to heel and toe walk bilaterally although he reports it is painful.  Significant tenderness over the bilateral lower lumbar paraspinal muscles with palpable muscle spasm.  Lumbar flexion severely restricted.  Lumbar extension severely restricted.  Lumbar facet loading maneuvers increased low back pain bilaterally.      The patient has 5/5 strength for the bilateral hip flexors, knee flexors/extensors, ankle dorsiflexors/plantar flexors, ankle evertors/invertors.    NEUROLOGICAL: 2+ patellar, achilles reflexes which are symmetric bilaterally.  No ankle clonus bilaterally.  Sensation to light touch is intact in the bilateral L4, L5, and S1 dermatomes.       RESULTS: I reviewed the MRI lumbar spine from [place] dated June 2, 2021.  At L4-5 there is a left paracentral disc protrusion with left lateral recess stenosis and mild bilateral foraminal stenosis.  There is also moderate bilateral foraminal stenosis at L5-S1.  There is moderate facet arthropathy L5-S1 and mild facet arthropathy L4-5.

## 2022-10-17 ENCOUNTER — TELEPHONE (OUTPATIENT)
Dept: PHYSICAL MEDICINE AND REHAB | Facility: CLINIC | Age: 57
End: 2022-10-17

## 2022-10-17 DIAGNOSIS — M47.816 LUMBAR FACET ARTHROPATHY: Primary | ICD-10-CM

## 2022-10-17 NOTE — TELEPHONE ENCOUNTER
Patient returned call. He would like to get scheduled and have the FJIs ASAP. Injection requirements reviewed in full with patient. Discussed steroids have immunosuppressant properties which could potentially put patient at a higher risk for a worsened course of any infection including COVID. Stated understanding. Transferred to scheduling to make appointment.

## 2022-10-17 NOTE — TELEPHONE ENCOUNTER
Phone call to patient to discuss. Left message to return call.    ~Can review injection requirements with him as well as get scheduled upon call back if he wants to move forward.

## 2022-10-17 NOTE — TELEPHONE ENCOUNTER
----- Message from Zuleyma Murphy PA-C sent at 10/17/2022 12:44 PM CDT -----  Regarding: FJI  Please call this patient back and let him know that we can try to submit for bilateral L4-5, L5-S1 facet joint injections.  His insurance will review and let us know if they are covered.  The order for the facet joint injections is in.

## 2022-10-19 NOTE — TELEPHONE ENCOUNTER
Per message from DICKSON Muprhy PA-C: Care navigation: can you please call this patient and let him know that the FJI will not be covered but he could try bilateral L3, L4, L5 MBBs as a workup towards radiofrequency ablation instead?             Phone call to patient to discuss. Left message to return call.

## 2022-10-19 NOTE — TELEPHONE ENCOUNTER
Patient returned call. Explained the original facet injection injections with steroid is not covered by his insurance. Explained he could try the bilateral L3, L4, L5 MBBs as offered by [facility]. MBB and RFA explained. He would like to move forward with this. Order placed. Injection requirements reviewed in full with patient. Stated understanding. He will come for the scheduled time on 10/24/22 for MBBs.

## 2022-10-24 ENCOUNTER — RADIOLOGY INJECTION OFFICE VISIT (OUTPATIENT)
Dept: PHYSICAL MEDICINE AND REHAB | Facility: CLINIC | Age: 57
End: 2022-10-24
Attending: PHYSICIAN ASSISTANT
Payer: COMMERCIAL

## 2022-10-24 VITALS
HEART RATE: 76 BPM | RESPIRATION RATE: 16 BRPM | TEMPERATURE: 98.9 F | SYSTOLIC BLOOD PRESSURE: 142 MMHG | DIASTOLIC BLOOD PRESSURE: 74 MMHG | OXYGEN SATURATION: 97 %

## 2022-10-24 DIAGNOSIS — M47.816 LUMBAR FACET ARTHROPATHY: ICD-10-CM

## 2022-10-24 PROCEDURE — 64494 INJ PARAVERT F JNT L/S 2 LEV: CPT | Mod: 50 | Performed by: PHYSICAL MEDICINE & REHABILITATION

## 2022-10-24 PROCEDURE — 64493 INJ PARAVERT F JNT L/S 1 LEV: CPT | Mod: 50 | Performed by: PHYSICAL MEDICINE & REHABILITATION

## 2022-10-24 PROCEDURE — A9576 INJ PROHANCE MULTIPACK: HCPCS | Performed by: PHYSICAL MEDICINE & REHABILITATION

## 2022-10-24 RX ORDER — BUPIVACAINE HYDROCHLORIDE 7.5 MG/ML
INJECTION, SOLUTION EPIDURAL; RETROBULBAR
Status: COMPLETED | OUTPATIENT
Start: 2022-10-24 | End: 2022-10-24

## 2022-10-24 RX ADMIN — BUPIVACAINE HYDROCHLORIDE 3 ML: 7.5 INJECTION, SOLUTION EPIDURAL; RETROBULBAR at 10:02

## 2022-10-24 ASSESSMENT — PAIN SCALES - GENERAL
PAINLEVEL: SEVERE PAIN (6)
PAINLEVEL: EXTREME PAIN (8)

## 2022-10-24 NOTE — PATIENT INSTRUCTIONS
DISCHARGE INSTRUCTIONS    During office hours (8:00 a.m.- 4:00 p.m.) questions or concerns may be answered  by calling Spine Center Navigation Nurses at  [phone].  Messages received after hours will be returned the following business day.      In the case of an emergency, please dial 911 or seek assistance at the nearest Emergency Room/Urgent Care facility.     All Patients:  You may experience an increase in your symptoms for the first 2 days, once the numbing medication wears off.    You may resume your regular medication, no pain medication until you have completed your diary    You may shower. No swimming, tub bath or hot tub for 24 hours; remove bandage after 4 hours    Continue your activities that can cause you pain to test the blocks.                         You should not drive for the next 3 to 5 hours (have someone drive you)           POSSIBLE PROCEDURE SIDE EFFECTS  -Call Spine Center if concerned-    Increased Pain  Increased numbness/tingling     Nausea/Vomiting  Bruising/bleeding at site (hematoma)             Swelling at site (edema) Headache  Difficulty walking  Infection        Fever greater than 100.5

## 2022-10-26 ENCOUNTER — PATIENT OUTREACH (OUTPATIENT)
Dept: GASTROENTEROLOGY | Facility: CLINIC | Age: 57
End: 2022-10-26

## 2022-10-26 NOTE — PROGRESS NOTES
Called to check in with pt after office visit, message left asking for return call, number provided.

## 2022-10-31 ENCOUNTER — OFFICE VISIT (OUTPATIENT)
Dept: PHYSICAL MEDICINE AND REHAB | Facility: CLINIC | Age: 57
End: 2022-10-31
Payer: COMMERCIAL

## 2022-10-31 VITALS
WEIGHT: 170 LBS | SYSTOLIC BLOOD PRESSURE: 156 MMHG | DIASTOLIC BLOOD PRESSURE: 70 MMHG | HEIGHT: 70 IN | BODY MASS INDEX: 24.34 KG/M2

## 2022-10-31 DIAGNOSIS — M54.50 LUMBAR SPINE PAIN: ICD-10-CM

## 2022-10-31 DIAGNOSIS — M51.369 DDD (DEGENERATIVE DISC DISEASE), LUMBAR: ICD-10-CM

## 2022-10-31 DIAGNOSIS — M48.061 LUMBAR FORAMINAL STENOSIS: ICD-10-CM

## 2022-10-31 DIAGNOSIS — M54.41 CHRONIC BILATERAL LOW BACK PAIN WITH RIGHT-SIDED SCIATICA: ICD-10-CM

## 2022-10-31 DIAGNOSIS — M54.16 LUMBAR RADICULITIS: Primary | ICD-10-CM

## 2022-10-31 DIAGNOSIS — G89.29 CHRONIC BILATERAL LOW BACK PAIN WITH RIGHT-SIDED SCIATICA: ICD-10-CM

## 2022-10-31 PROCEDURE — 99214 OFFICE O/P EST MOD 30 MIN: CPT | Performed by: NURSE PRACTITIONER

## 2022-10-31 ASSESSMENT — PAIN SCALES - GENERAL: PAINLEVEL: MODERATE PAIN (5)

## 2022-10-31 NOTE — LETTER
10/31/2022         RE: Ivan Bell  [address] [facility] [address]        Dear Colleague,    Thank you for referring your patient, Ivan Bell, to the [facility] SPINE [facility] NEUROSURGERY. Please see a copy of my visit note below.      Assessment:     Diagnoses and all orders for this visit:  Lumbar radiculitis  -     Physical Therapy Referral; Future  -     PAIN Transforaminal RIYA Inj Lumbosacral Tyron; Future  Chronic bilateral low back pain with right-sided sciatica  -     Physical Therapy Referral; Future  Lumbar spine pain  -     Physical Therapy Referral; Future  -     PAIN Transforaminal RIYA Inj Lumbosacral Tyorn; Future  DDD (degenerative disc disease), lumbar  -     Physical Therapy Referral; Future  Lumbar foraminal stenosis  -     Physical Therapy Referral; Future  -     PAIN Transforaminal RIYA Inj Lumbosacral Tyron; Future     Ivan Bell is a 57 year old y.o. male with past medical history significant for hypertriglyceridemia, hypertension, gouty arthropathy, nephrolithiasis, chronic kidney disease stage II, thrombocytopenia, alcoholic cirrhosis of the liver with ascites, cystoscopy with lithotripsy and bilateral ureteral stent placement 7/1/2022 with stent removal 7/27/2022, right total knee replacement who presents today for follow-up regarding:    -Chronic bilateral low back pain with lumbar radiculitis right greater than left, short-term relief with right L4-5 and L5-S1 TFESI, failed response with medial branch block.  MRI with disc bulging greater at L4-5 with bilateral nerve compression.     Plan:     A shared decision making plan was used. The patient's values and choices were respected. Prior medical records were reviewed today. The following represents what was discussed and decided upon by the provider and the patient.        -DIAGNOSTIC TESTS: Images were personally reviewed and interpreted.   --Lumbar spine MRI 2021 with L4-5 moderate disc height loss with bone spurring  with left disc protrusion with left L5 nerve root compression, mild bilateral foraminal stenosis.  L5-S1 moderate disc height loss with facet arthropathy with moderate bilateral foraminal stenosis.    -INTERVENTIONS: Ordered bilateral L4-5 transforaminal epidural steroid injection to see if we can improve his lumbar radiculitis.  If no benefit with this injection we could consider bilateral L5-S1 TFESI as he does have foraminal stenosis at both levels    -MEDICATIONS: No changes in medications today, advised patient continue acetaminophen and baclofen as needed.  Discussed side effects of medications and proper use. Patient verbalized understanding.    -PHYSICAL THERAPY: Referral to physical therapy lumbar MedX program here at the spine center for intensive core strengthening and nerve glide exercises.  Discussed the importance of core strengthening, ROM, stretching exercises with the patient and how each of these entities is important in decreasing pain.  Explained to the patient that the purpose of physical therapy is to teach the patient a home exercise program.  These exercises need to be performed every day in order to decrease pain and prevent future occurrences of pain.        -PATIENT EDUCATION:  Total time of 32 minutes, on the day of service, spent with the patient, reviewing the chart, placing orders, and documenting.   -Today we also discussed the issues related to the current COVID-19 pandemic, the pros and cons of the current treatment plan, the CDC guidelines such as social distancing, washing the hands, and covering the cough.    -FOLLOW UP: Follow-up for injection then 2 weeks postinjection  Advised to contact clinic if symptoms worsen or change.    Subjective:     Ivan Bell is a 57 year old male who presents today for follow-up regarding chronic bilateral low back pain slightly greater on the right with intermittent numbness and tingling bilateral lower extremities also greater on the right  that is ongoing and unchanged for the last couple of years.  Currently his pain is a constant 5/10, 7 at its worst, 4 at its best aggravated with lifting, does improve with laying and standing straight.    Patient denies lower extremity weakness.    Denies any recent trips or falls or balance changes.  Denies bowel or bladder loss control.    -Treatment to Date: No prior spinal surgery   Physical therapy 2021 LBP     History of lumbar epidural steroid injection [facility] 2021 with benefit  Right L4-5 and L5-S1 TFESI 8/24/2022 with 7 to 8% relief for 2 weeks only.  Bilateral L3, L4, L5 MBB 50% relief at best.     -Medications:  Acetaminophen    Patient Active Problem List   Diagnosis     Hypertriglyceridemia     Gouty arthropathy     Erectile dysfunction     CARDIOVASCULAR SCREENING; LDL GOAL LESS THAN 130     Hypertension goal BP (blood pressure) < 140/90     Alcoholic cirrhosis of liver with ascites (H)     Nephrolithiasis     CKD (chronic kidney disease) stage 2, GFR 60-89 ml/min     Seasonal allergic rhinitis, unspecified trigger     Thrombocytopenia (H)     Cervicalgia       Current Outpatient Medications   Medication     acetaminophen (TYLENOL) 500 MG tablet     Ascorbic Acid (VITAMIN C PO)     baclofen (LIORESAL) 10 MG tablet     eplerenone (INSPRA) 50 MG tablet     fexofenadine (ALLEGRA) 60 MG tablet     furosemide (LASIX) 20 MG tablet     furosemide (LASIX) 40 MG tablet     gabapentin (NEURONTIN) 300 MG capsule     lactulose (CHRONULAC) 10 GM/15ML solution     Menaquinone-7 (VITAMIN K2) 100 MCG CAPS     MULTIPLE VITAMINS PO     potassium chloride ER (KLOR-CON M) 20 MEQ CR tablet     rifaximin (XIFAXAN) 550 MG TABS tablet     sertraline (ZOLOFT) 50 MG tablet     sildenafil (REVATIO) 20 MG tablet     sodium bicarbonate 650 MG tablet     vitamin D3 (CHOLECALCIFEROL) 2000 units (50 mcg) tablet     No current facility-administered medications for this visit.       Allergies   Allergen Reactions     Prednisone  "Visual Disturbance     Trazodone Visual Disturbance     Benadryl [Diphenhydramine] Other (See Comments)     Delirium (visual and auditory hallucinations)       Past Medical History:   Diagnosis Date     Alcoholic cirrhosis of liver (H)      Alcoholic hepatitis 03/2019     Chronic kidney disease     CRF     Depression      Gout      History of transfusion      Hypertension      Hypertriglyceridemia      Left calcaneus fracture 01/09/2006 January 16, 2006: Fell 10 feet from ladder onto left foot on frozen ground on 1/9/06 at home.  Immediate pain and unable to walk- seen at [place] and diagnosed with calcaneus fracture     Nephrolithiasis      Osteoarthritis      Portal vein thrombosis     left occlusion, partial main     Thrombocytopenia (H)         Review of Systems  ROS:  Specifically negative for bowel/bladder dysfunction, balance changes, headache, dizziness, foot drop, fevers, chills, appetite changes, nausea/vomiting, unexplained weight loss. Otherwise 13 systems reviewed are negative. Please see the patient's intake questionnaire from today for details.    Reviewed Social, Family, Past Medical and Past Surgical history with patient, no significant changes noted since prior visit.     Objective:     BP (!) 156/70 (BP Location: Right arm, Patient Position: Sitting)   Ht 5' 10\" (1.778 m)   Wt 170 lb (77.1 kg)   BMI 24.39 kg/m      PHYSICAL EXAMINATION:    --CONSTITUTIONAL: Well developed, well nourished, healthy appearing individual.  --PSYCHIATRIC: Appropriate mood and affect. No difficulty interacting due to temper, social withdrawal, or memory issues.  --SKIN: Lumbar region is dry and intact.   --RESPIRATORY: Normal rhythm and effort. No abnormal accessory muscle breathing patterns noted.   --MUSCULOSKELETAL:  Normal lumbar lordosis noted, no lateral shift.  --GROSS MOTOR: Easily arises from a seated position. Gait is non-antalgic  --LUMBAR SPINE:  Inspection reveals no evidence of deformity. "     RESULTS:   Imaging: Spine imaging was reviewed today. The images were shown to the patient and the findings were explained using a spine model.      Lumbar spine MRI reviewed from 2021                          Again, thank you for allowing me to participate in the care of your patient.        Sincerely,        Nina Montoya, CNP

## 2022-10-31 NOTE — PROGRESS NOTES
Assessment:     Diagnoses and all orders for this visit:  Lumbar radiculitis  -     Physical Therapy Referral; Future  -     PAIN Transforaminal RIYA Inj Lumbosacral Tyron; Future  Chronic bilateral low back pain with right-sided sciatica  -     Physical Therapy Referral; Future  Lumbar spine pain  -     Physical Therapy Referral; Future  -     PAIN Transforaminal RIYA Inj Lumbosacral Tyron; Future  DDD (degenerative disc disease), lumbar  -     Physical Therapy Referral; Future  Lumbar foraminal stenosis  -     Physical Therapy Referral; Future  -     PAIN Transforaminal RIYA Inj Lumbosacral Tyron; Future     Ivan T Ray is a 57 year old y.o. male with past medical history significant for hypertriglyceridemia, hypertension, gouty arthropathy, nephrolithiasis, chronic kidney disease stage II, thrombocytopenia, alcoholic cirrhosis of the liver with ascites, cystoscopy with lithotripsy and bilateral ureteral stent placement 7/1/2022 with stent removal 7/27/2022, right total knee replacement who presents today for follow-up regarding:    -Chronic bilateral low back pain with lumbar radiculitis right greater than left, short-term relief with right L4-5 and L5-S1 TFESI, failed response with medial branch block.  MRI with disc bulging greater at L4-5 with bilateral nerve compression.     Plan:     A shared decision making plan was used. The patient's values and choices were respected. Prior medical records were reviewed today. The following represents what was discussed and decided upon by the provider and the patient.        -DIAGNOSTIC TESTS: Images were personally reviewed and interpreted.   --Lumbar spine MRI 2021 with L4-5 moderate disc height loss with bone spurring with left disc protrusion with left L5 nerve root compression, mild bilateral foraminal stenosis.  L5-S1 moderate disc height loss with facet arthropathy with moderate bilateral foraminal stenosis.    -INTERVENTIONS: Ordered bilateral L4-5 transforaminal  epidural steroid injection to see if we can improve his lumbar radiculitis.  If no benefit with this injection we could consider bilateral L5-S1 TFESI as he does have foraminal stenosis at both levels    -MEDICATIONS: No changes in medications today, advised patient continue acetaminophen and baclofen as needed.  Discussed side effects of medications and proper use. Patient verbalized understanding.    -PHYSICAL THERAPY: Referral to physical therapy lumbar MedX program here at the spine center for intensive core strengthening and nerve glide exercises.  Discussed the importance of core strengthening, ROM, stretching exercises with the patient and how each of these entities is important in decreasing pain.  Explained to the patient that the purpose of physical therapy is to teach the patient a home exercise program.  These exercises need to be performed every day in order to decrease pain and prevent future occurrences of pain.        -PATIENT EDUCATION:  Total time of 32 minutes, on the day of service, spent with the patient, reviewing the chart, placing orders, and documenting.   -Today we also discussed the issues related to the current COVID-19 pandemic, the pros and cons of the current treatment plan, the CDC guidelines such as social distancing, washing the hands, and covering the cough.    -FOLLOW UP: Follow-up for injection then 2 weeks postinjection  Advised to contact clinic if symptoms worsen or change.    Subjective:     Ivan Bell is a 57 year old male who presents today for follow-up regarding chronic bilateral low back pain slightly greater on the right with intermittent numbness and tingling bilateral lower extremities also greater on the right that is ongoing and unchanged for the last couple of years.  Currently his pain is a constant 5/10, 7 at its worst, 4 at its best aggravated with lifting, does improve with laying and standing straight.    Patient denies lower extremity weakness.    Denies  any recent trips or falls or balance changes.  Denies bowel or bladder loss control.    -Treatment to Date: No prior spinal surgery   Physical therapy 2021 LBP     History of lumbar epidural steroid injection [facility] 2021 with benefit  Right L4-5 and L5-S1 TFESI 8/24/2022 with 7 to 8% relief for 2 weeks only.  Bilateral L3, L4, L5 MBB 50% relief at best.     -Medications:  Acetaminophen    Patient Active Problem List   Diagnosis     Hypertriglyceridemia     Gouty arthropathy     Erectile dysfunction     CARDIOVASCULAR SCREENING; LDL GOAL LESS THAN 130     Hypertension goal BP (blood pressure) < 140/90     Alcoholic cirrhosis of liver with ascites (H)     Nephrolithiasis     CKD (chronic kidney disease) stage 2, GFR 60-89 ml/min     Seasonal allergic rhinitis, unspecified trigger     Thrombocytopenia (H)     Cervicalgia       Current Outpatient Medications   Medication     acetaminophen (TYLENOL) 500 MG tablet     Ascorbic Acid (VITAMIN C PO)     baclofen (LIORESAL) 10 MG tablet     eplerenone (INSPRA) 50 MG tablet     fexofenadine (ALLEGRA) 60 MG tablet     furosemide (LASIX) 20 MG tablet     furosemide (LASIX) 40 MG tablet     gabapentin (NEURONTIN) 300 MG capsule     lactulose (CHRONULAC) 10 GM/15ML solution     Menaquinone-7 (VITAMIN K2) 100 MCG CAPS     MULTIPLE VITAMINS PO     potassium chloride ER (KLOR-CON M) 20 MEQ CR tablet     rifaximin (XIFAXAN) 550 MG TABS tablet     sertraline (ZOLOFT) 50 MG tablet     sildenafil (REVATIO) 20 MG tablet     sodium bicarbonate 650 MG tablet     vitamin D3 (CHOLECALCIFEROL) 2000 units (50 mcg) tablet     No current facility-administered medications for this visit.       Allergies   Allergen Reactions     Prednisone Visual Disturbance     Trazodone Visual Disturbance     Benadryl [Diphenhydramine] Other (See Comments)     Delirium (visual and auditory hallucinations)       Past Medical History:   Diagnosis Date     Alcoholic cirrhosis of liver (H)      Alcoholic  "hepatitis 03/2019     Chronic kidney disease     CRF     Depression      Gout      History of transfusion      Hypertension      Hypertriglyceridemia      Left calcaneus fracture 01/09/2006 January 16, 2006: Fell 10 feet from ladder onto left foot on frozen ground on 1/9/06 at home.  Immediate pain and unable to walk- seen at [place] and diagnosed with calcaneus fracture     Nephrolithiasis      Osteoarthritis      Portal vein thrombosis     left occlusion, partial main     Thrombocytopenia (H)         Review of Systems  ROS:  Specifically negative for bowel/bladder dysfunction, balance changes, headache, dizziness, foot drop, fevers, chills, appetite changes, nausea/vomiting, unexplained weight loss. Otherwise 13 systems reviewed are negative. Please see the patient's intake questionnaire from today for details.    Reviewed Social, Family, Past Medical and Past Surgical history with patient, no significant changes noted since prior visit.     Objective:     BP (!) 156/70 (BP Location: Right arm, Patient Position: Sitting)   Ht 5' 10\" (1.778 m)   Wt 170 lb (77.1 kg)   BMI 24.39 kg/m      PHYSICAL EXAMINATION:    --CONSTITUTIONAL: Well developed, well nourished, healthy appearing individual.  --PSYCHIATRIC: Appropriate mood and affect. No difficulty interacting due to temper, social withdrawal, or memory issues.  --SKIN: Lumbar region is dry and intact.   --RESPIRATORY: Normal rhythm and effort. No abnormal accessory muscle breathing patterns noted.   --MUSCULOSKELETAL:  Normal lumbar lordosis noted, no lateral shift.  --GROSS MOTOR: Easily arises from a seated position. Gait is non-antalgic  --LUMBAR SPINE:  Inspection reveals no evidence of deformity.     RESULTS:   Imaging: Spine imaging was reviewed today. The images were shown to the patient and the findings were explained using a spine model.      Lumbar spine MRI reviewed from 2021                      "

## 2022-10-31 NOTE — PATIENT INSTRUCTIONS
~Please call our [facility] Nurse Navigation line [phone] with any questions or concerns about your treatment plan, if symptoms worsen and you would like to be seen urgently, or if you have problems controlling bladder and bowel function.     ~You have been referred for Physical Therapy to [facility]. They will call you to schedule an appointment.      Scheduling phone number is [phone] for [facility], [place], or [place] location.  If you have not heard from the scheduling office within 2 business days, please call [phone] for ALL other locations.    Discussed the importance of core strengthening, ROM, stretching exercises and how each of these entities is important in decreasing pain and improving long term spine health.  The purpose of physical therapy is to teach you an individualized home exercise program.  These exercises need to be performed every day in order to decrease pain and prevent future occurrences of pain.         An injection has been ordered today to potentially help with your pain symptoms. These injections do not fix what is going on in your back, therefore they typically do not take away the pain completely, however they can many times help improve symptoms. Injections should always be completed along with other modalities such as physical therapy for the best long term outcomes. If injections alone are done, then pain will likely return.     [facility] Injection Requirements    A  is required for all fluoroscopically-guided injections.  Injection appointments may be cancelled if there are signs/symptoms of an active infection or if the patient is being actively treated with antibiotics for a diagnosed infection.  Patients may have their steroid injection cancelled if they have had another steroid injection within 2 weeks.  Diabetic patients will have their blood glucose levels checked the day  of their injection and the appointment will be rescheduled if the blood glucose level is 300 or higher.  Patients with allergies to cortisone, local anesthetics, iodine, or contrast dye should contact the Spine Center to further discuss these considerations.  Patients scheduled for medial branch block diagnostic injections should refrain from taking pain medication the day of the procedure.  The medial branch block injection appointment will be rescheduled if the patient's pain rating is not 5/10 or greater at the time of the procedure.  Patients taking warfarin/Coumadin will have their INR checked the day of the procedure and the procedure may be rescheduled if the INR is greater than 3.0.  Please contact the Spine Center [phone]) if you are taking any prescription blood-thinning medications (warfarin, Plavix, Lovenox, Eliquis, Brilinta, Effient, etc.) as special dosing adjustments may need to be made depending on the type of injection you are scheduled to receive.  It is recommended that you delay having your steroid injection if you have received a flu shot or shingles vaccine within 2 weeks.

## 2022-11-02 ENCOUNTER — RADIOLOGY INJECTION OFFICE VISIT (OUTPATIENT)
Dept: PHYSICAL MEDICINE AND REHAB | Facility: CLINIC | Age: 57
End: 2022-11-02
Attending: NURSE PRACTITIONER
Payer: COMMERCIAL

## 2022-11-02 VITALS
OXYGEN SATURATION: 96 % | HEART RATE: 69 BPM | DIASTOLIC BLOOD PRESSURE: 64 MMHG | SYSTOLIC BLOOD PRESSURE: 130 MMHG | RESPIRATION RATE: 16 BRPM | TEMPERATURE: 98.6 F

## 2022-11-02 DIAGNOSIS — M54.50 LUMBAR SPINE PAIN: ICD-10-CM

## 2022-11-02 DIAGNOSIS — M54.16 LUMBAR RADICULITIS: ICD-10-CM

## 2022-11-02 DIAGNOSIS — M48.061 LUMBAR FORAMINAL STENOSIS: ICD-10-CM

## 2022-11-02 PROCEDURE — 64483 NJX AA&/STRD TFRM EPI L/S 1: CPT | Mod: 50 | Performed by: PAIN MEDICINE

## 2022-11-02 RX ORDER — DEXAMETHASONE SODIUM PHOSPHATE 10 MG/ML
INJECTION, SOLUTION INTRAMUSCULAR; INTRAVENOUS
Status: COMPLETED | OUTPATIENT
Start: 2022-11-02 | End: 2022-11-02

## 2022-11-02 RX ORDER — LIDOCAINE HYDROCHLORIDE 10 MG/ML
INJECTION, SOLUTION EPIDURAL; INFILTRATION; INTRACAUDAL; PERINEURAL
Status: COMPLETED | OUTPATIENT
Start: 2022-11-02 | End: 2022-11-02

## 2022-11-02 RX ADMIN — DEXAMETHASONE SODIUM PHOSPHATE 20 MG: 10 INJECTION, SOLUTION INTRAMUSCULAR; INTRAVENOUS at 08:28

## 2022-11-02 RX ADMIN — LIDOCAINE HYDROCHLORIDE 4 ML: 10 INJECTION, SOLUTION EPIDURAL; INFILTRATION; INTRACAUDAL; PERINEURAL at 08:26

## 2022-11-02 ASSESSMENT — PAIN SCALES - GENERAL
PAINLEVEL: MILD PAIN (2)
PAINLEVEL: SEVERE PAIN (7)

## 2022-11-02 NOTE — PATIENT INSTRUCTIONS
Follow-up visit with Nina Montoya CNP in 2 weeks to discuss injection outcome and determine care plan going forward.       DISCHARGE INSTRUCTIONS    During office hours (8:00 a.m.- 4:00 p.m.) questions or concerns may be answered  by calling Spine Center Navigation Nurses at  [phone].  Messages received after hours will be returned the following business day.      In the case of an emergency, please dial 911 or seek assistance at the nearest Emergency Room/Urgent Care facility.     All Patients:    You may experience an increase in your symptoms for the first 2 days (It may take anywhere between 2 days- 2 weeks for the steroid to have maximum effect).    You may use ice on the injection site, as frequently as 20 minutes each hour if needed.    You may take your pain medicine.    You may continue taking your regular medication after your injection. If you have had a Medial Branch Block you may resume pain medication once your pain diary is completed.    You may shower. No swimming, tub bath or hot tub for 48 hours.  You may remove your bandaid/bandage as soon as you are home.    You may resume light activities, as tolerated.    Resume your usual diet as tolerated.    It is strongly advised that you do not drive for 1-3 hours post injection.    If you have had oral sedation:  Do not drive for 8 hours post injection.      If you have had IV sedation:  Do not drive for 24 hours post injection.  Do not operate hazardous machinery or make important personal/business decisions for 24 hours.      POSSIBLE STEROID SIDE EFFECTS (If steroid/cortisone was used for your procedure)    -If you experience these symptoms, it should only last for a short period    Swelling of the legs              Skin redness (flushing)     Mouth (oral) irritation   Blood sugar (glucose) levels            Sweats                    Mood changes  Headache  Sleeplessness  Weakened immune system for up to 14 days, which could increase the  risk of nicolasa the COVID-19 virus and/or experiencing more severe symptoms of the disease, if exposed.  Decreased effectiveness of the flu vaccine if given within 2 weeks of the steroid.         POSSIBLE PROCEDURE SIDE EFFECTS  -Call the Spine Center if you are concerned  Increased Pain           Increased numbness/tingling      Nausea/Vomiting          Bruising/bleeding at site      Redness or swelling                                              Difficulty walking      Weakness           Fever greater than 100.5    *In the event of a severe headache after an epidural steroid injection that is relieved by lying down, please call the [facility] to speak with a clinical staff member*

## 2022-11-07 DIAGNOSIS — K76.82 HEPATIC ENCEPHALOPATHY (H): ICD-10-CM

## 2022-11-07 NOTE — TELEPHONE ENCOUNTER
rifaximin (XIFAXAN) 550 MG TABS tablet  Last Written Prescription Date:   10/20/2021  Last Fill Quantity: 180,   # refills: 3  Last Office Visit :  10/12/2022  Future Office visit:   4/10/2023    Routing refill request to provider for review/approval because:  Drug not on the [facility], [facility] or [facility] refill protocol or controlled substance      Ilsa Steward RN  Central Triage Red Flags/Med Refills

## 2022-11-08 ENCOUNTER — TELEPHONE (OUTPATIENT)
Dept: UROLOGY | Facility: CLINIC | Age: 57
End: 2022-11-08

## 2022-11-08 NOTE — TELEPHONE ENCOUNTER
Left Voicemail (1st Attempt) for the patient to call back and schedule the following:    Appointment type: return   Provider: dr. Alvarado   Return date: 8/4/2023   Specialty phone number: [phone]   Additonal Notes: : Return in 1 year (on 8/4/2023).    Luzma wheeler Procedure   Orthopedics, Podiatry, Sports Medicine, Ent ,Eye , Audiology, Adult Endocrine & Diabetes, Nutrition & Medication Therapy Management Specialties   [facility]

## 2022-11-09 ENCOUNTER — OFFICE VISIT (OUTPATIENT)
Dept: FAMILY MEDICINE | Facility: CLINIC | Age: 57
End: 2022-11-09
Payer: COMMERCIAL

## 2022-11-09 VITALS
BODY MASS INDEX: 26.26 KG/M2 | DIASTOLIC BLOOD PRESSURE: 58 MMHG | RESPIRATION RATE: 16 BRPM | TEMPERATURE: 97 F | OXYGEN SATURATION: 100 % | HEART RATE: 76 BPM | WEIGHT: 183 LBS | SYSTOLIC BLOOD PRESSURE: 124 MMHG

## 2022-11-09 DIAGNOSIS — G47.00 INSOMNIA, UNSPECIFIED TYPE: Primary | ICD-10-CM

## 2022-11-09 DIAGNOSIS — F41.9 ANXIETY: ICD-10-CM

## 2022-11-09 PROCEDURE — 91313 COVID-19,PF,MODERNA BIVALENT: CPT | Performed by: PHYSICIAN ASSISTANT

## 2022-11-09 PROCEDURE — 99213 OFFICE O/P EST LOW 20 MIN: CPT | Performed by: PHYSICIAN ASSISTANT

## 2022-11-09 PROCEDURE — 0134A COVID-19,PF,MODERNA BIVALENT: CPT | Performed by: PHYSICIAN ASSISTANT

## 2022-11-09 RX ORDER — HYDROXYZINE PAMOATE 50 MG/1
50 CAPSULE ORAL
Qty: 60 CAPSULE | Refills: 2 | Status: ON HOLD | OUTPATIENT
Start: 2022-11-09 | End: 2023-09-19

## 2022-11-09 RX ORDER — TRAZODONE HYDROCHLORIDE 50 MG/1
50-100 TABLET, FILM COATED ORAL
Qty: 60 TABLET | Refills: 2 | Status: ON HOLD | OUTPATIENT
Start: 2022-11-09 | End: 2023-09-19

## 2022-11-09 ASSESSMENT — PAIN SCALES - GENERAL: PAINLEVEL: SEVERE PAIN (6)

## 2022-11-09 NOTE — PROGRESS NOTES
Assessment & Plan   Problem List Items Addressed This Visit    None  Visit Diagnoses     Insomnia, unspecified type    -  Primary    Relevant Medications    traZODone (DESYREL) 50 MG tablet    hydrOXYzine (VISTARIL) 50 MG capsule    Anxiety        Relevant Medications    traZODone (DESYREL) 50 MG tablet    hydrOXYzine (VISTARIL) 50 MG capsule         Will trial trazodone and/or hydroxyzine for sleep aid. Also admits that his anxiety is partly to blame. We could certainly ramp up his sertraline as well. Has trazodone listed as an allergy (visual disturbance), but he thinks this was when he was drinking EtOH heavily and wants to try it again. Will follow up as below to reassess.    Complete history and physical exam as below. AF with normal VS.    DDx and Dx discussed with and explained to the pt to their satisfaction.  All questions were answered at this time. Pt expressed understanding of and agreement with this dx, tx, and plan. No further workup warranted and standard medication warnings given. I have given the patient a list of pertinent indications for re-evaluation. Will go to the Emergency Department if symptoms worsen or new concerning symptoms arise. Patient left in no apparent distress.     Prescription drug management    See Patient Instructions    Return in about 2 weeks (around 11/23/2022) for a recheck of your symptoms if not improving, or call 911/go to an ER anytime if worsening.    ROGER Baig  [facility] ERENDIRA Love is a 57 year old presenting for the following health issues:  Insomnia      HPI     Insomnia  Onset/Duration: a few years  Description:   Frequency of insomnia:  nightly  Time to fall asleep (sleep latency): some nights he doesn't fall asleep  Middle of night awakening:  YES  Early morning awakening:  YES  Progression of Symptoms:  same  Accompanying Signs & Symptoms:  Daytime sleepiness/napping: YES  Excessive snoring/apnea: No  Restless legs:  YES  Waking to urinate: YES  Chronic pain:  YES- back  Depression symptoms (if yes, do PHQ9): not sure  Anxiety symptoms (if yes, do OMID-7): don't know  History:  Prior Insomnia: YES  New stressful situation: YES  Precipitating factors:   Caffeine intake: No  OTC decongestants: No  Any new medications: No  Alleviating factors:  Self medicating (alcohol, etc.):  No  Stress-reduction (exercise, yoga, meditation etc): YES- work  Therapies tried and outcome: none    Review of Systems   Constitutional, HEENT, cardiovascular, pulmonary, gi and gu systems are negative, except as otherwise noted.      Objective    /58   Pulse 76   Temp 97  F (36.1  C) (Tympanic)   Resp 16   Wt 83 kg (183 lb)   SpO2 100%   BMI 26.26 kg/m    Body mass index is 26.26 kg/m .  Physical Exam  Vitals and nursing note reviewed.   Constitutional:       General: He is not in acute distress.     Appearance: Normal appearance. He is not diaphoretic.   HENT:      Head: Normocephalic and atraumatic.      Nose: Nose normal.   Eyes:      Conjunctiva/sclera: Conjunctivae normal.   Pulmonary:      Effort: Pulmonary effort is normal. No respiratory distress.   Skin:     General: Skin is dry.      Coloration: Skin is not jaundiced or pale.   Neurological:      General: No focal deficit present.      Mental Status: He is alert. Mental status is at baseline.   Psychiatric:         Mood and Affect: Mood normal.         Behavior: Behavior normal.

## 2022-11-09 NOTE — PATIENT INSTRUCTIONS
Chiquita Love,    Thank you for allowing [facility] to manage your care.    If you develop worsening/changing symptoms at any time, please call 911 or go to the emergency department for evaluation.    I sent your prescriptions to your pharmacy.    For difficulty sleeping, please use trazodone and/or hydroxyzine as prescribed. Do not use these medications while driving, operating machinery, with other sedating medications, or while drinking alcohol as it will make you drowsy.    Try the trazodone first. If this is not helping, try hydroxyzine instead. If that is not helping, you can take one tablet of each before bed.    If you have any questions or concerns, please feel free to call us at [phone]    Sincerely,    Justin Sanchez PA-C    Did you know?      You can schedule a video visit for follow-up appointments as well as future appointments for certain conditions.  Please see the below link.     [email or web address]    If you have not already done so,  I encourage you to sign up for [email or web address] ([email or web address]).  This will allow you to review your results, securely communicate with a provider, and schedule virtual visits as well.

## 2022-11-14 NOTE — TELEPHONE ENCOUNTER
Left Voicemail (2nd Attempt) for the patient to call back and schedule the following:    Appointment type: Return Urology  Provider: Dr. Vu  Return date: 8/4/2023  Specialty phone number: [phone]  Additional appointment(s) needed: none  Additonal Notes: Patient needs to schedule a one year return follow up with Dr. Vu, due 8/4/2023.    A [employer] message was also sent.    Meeta R/Procedure    [facility]   Neurology, NeuroSurgery, NeuroPsychology and Pain Management Specialties  Medical/Surgical Adult Specialties

## 2022-11-16 DIAGNOSIS — K70.31 ALCOHOLIC CIRRHOSIS OF LIVER WITH ASCITES (H): ICD-10-CM

## 2022-11-16 DIAGNOSIS — K76.82 HEPATIC ENCEPHALOPATHY (H): ICD-10-CM

## 2022-11-16 RX ORDER — LACTULOSE 10 G/15ML
SOLUTION ORAL
Qty: 2000 ML | Refills: 11 | Status: ON HOLD | OUTPATIENT
Start: 2022-11-16 | End: 2023-09-22

## 2022-11-16 NOTE — PROGRESS NOTES
Patient was prescribed hydroxyzine and trazodone recently. He became increasingly somnolent over several days, and decided these medications are not best for him. Spouse Elicia called asking if there were any other anxiety/depression medications that may also help with sleep that might work better for pt with liver disease.  Per Celestino Verduzco recommend that he talks to this primary about going up on sertraline to 50 mg or 75 mg and adjust as needed. Called Elicia and informed her of this recommendation.

## 2022-11-17 NOTE — PROGRESS NOTES
Patient's spouse called to inform that they have set up an account with [facility], and next step is to fax the prescription for Xifaxan. Prescription printed and will get signed and faxed at clinic tomorrow.

## 2022-11-19 ENCOUNTER — HEALTH MAINTENANCE LETTER (OUTPATIENT)
Age: 57
End: 2022-11-19

## 2022-12-06 ENCOUNTER — PRE VISIT (OUTPATIENT)
Dept: UROLOGY | Facility: CLINIC | Age: 57
End: 2022-12-06

## 2022-12-06 ENCOUNTER — PATIENT OUTREACH (OUTPATIENT)
Dept: GASTROENTEROLOGY | Facility: CLINIC | Age: 57
End: 2022-12-06

## 2022-12-06 NOTE — PROGRESS NOTES
Patient inquiring about the prescription for Xifaxan that was sent to [facility]. Account set up by spouse and writer faxed prescription on 11/18/22. Writer called [facility] and learned that the prescription has been processed, and next step is for pt to call and provide payment for first order. Provided pt with the toll free number and patient verbalized understanding. He has no other questions or concerns at this time, and states he is doing well overall.

## 2022-12-06 NOTE — TELEPHONE ENCOUNTER
Reason for Visit: Consult on Right kidney stone, Left renal stone    Diagnosis: Right kidney stone, Left renal stone    Rooming Requirements: Normal      [facility]  12/06/22  2:10 PM

## 2022-12-13 ENCOUNTER — VIRTUAL VISIT (OUTPATIENT)
Dept: NEPHROLOGY | Facility: CLINIC | Age: 57
End: 2022-12-13
Attending: UROLOGY
Payer: COMMERCIAL

## 2022-12-13 VITALS — HEIGHT: 70 IN | WEIGHT: 183 LBS | BODY MASS INDEX: 26.2 KG/M2

## 2022-12-13 DIAGNOSIS — N20.0 RIGHT KIDNEY STONE: ICD-10-CM

## 2022-12-13 DIAGNOSIS — N20.0 LEFT RENAL STONE: ICD-10-CM

## 2022-12-13 DIAGNOSIS — N20.0 RECURRENT KIDNEY STONES: Primary | ICD-10-CM

## 2022-12-13 PROCEDURE — 99215 OFFICE O/P EST HI 40 MIN: CPT | Mod: GT | Performed by: INTERNAL MEDICINE

## 2022-12-13 ASSESSMENT — PAIN SCALES - GENERAL: PAINLEVEL: NO PAIN (0)

## 2022-12-13 NOTE — PROGRESS NOTES
Ivan is a 57 year old who is being evaluated via a billable video visit.      How would you like to obtain your AVS? MyChart  If the video visit is dropped, the invitation should be resent by: Text to cell phone: [phone]  Will anyone else be joining your video visit? No        Video-Visit Details    Video Start Time: 2:02 PM    Type of service:  Video Visit    Video End Time:2:19 PM    Originating Location (pt. Location): Home        Distant Location (provider location):  Off-site    Platform used for Video Visit: [facility]    Alma Moses MD  2022     Name: Ivan Bell  MRN: [id number]  : 1965  Referring provider: Dylan Galaviz MD     Assessment and Plan:  Ivan Bell is a 57 year old with alcohol related cirrhosis, ascites, HE and recurrent kidney stones.     # Nephrolithiasis - stone type- primarily calcium oxalate - 2022, 2022 stone analysis  Fluid intake limited given cirrhosis and ascites    # history AMBER - creatinine 1.47 with eGFR 55 on 10/12/2022  - creatinine and GFR fluctuate (hemodynamic), creatinine often around 1.3 mg/dL and eGFR often close to 65     # alcohol related cirrhosis with complications of ascites and hepatic encephalopathy  - last visit with Dr. Bales 10/12/2022 and MELD-Na was 17 at that time  - diuretics furosemide 40 mg am and 20 mg pm, has been off eplerenone  - lactulose and rifaximin         24 hour urine asap  CT scan/imaging summer 2023  Return in about 6 months (around 2023).     47 minutes spent on visit, meeting with Ivan Bell and in chart review and documentation on date of encounter, 22      Alma Moses MD  [facility]  Department of Medicine  Division of Nephrology and Hypertension  [facility]  edilma Johnson Web Console     Reason For Visit:   Nephrolithiasis.     HPI:   Ivan Bell is a 57 year old  with PMHx of cirrhosis due to  alcohol use, ascites, HE, TIPS in 2018 and revisions 2019, 2020, 2021 and Jan 2022 and kidney stones.  Has passed many stones over the years.     I previously had a visit with Ivan in 2019 and then a brief telephone visit 3/24/2020. Had reviewed options of low salt diet and high fruit and vegetable intake. Limited in terms of some preventative measures due to cirrhosis. Has been on loop diuretic and mineralocorticoid-receptor antagonists (currently on furosemide alone). Has history of hypokalemia requiring potassium supplement and also low bicarb requiring sodium bicarbonate (avoid citrate in patient with liver disease).  April 2022, Iavn was evaluated by Dr. Dylan Galaviz for significant bilateral kidney stone burden. There was significant stone burden on left and staged procedure planned.    7/1/2022 he had bilateral URS and laser lithotripsy with basket stone removal  7/27/2022 - bilateral URS and laser lithotripsy on left  All accessible stone fragments removed  Noted to have several calyces with evidence of stones which were deeper in the parenchyma  Ultrasound for TIPS on 8/22/22 showed bilateral nephrolithiasis    diet is 3 meals per day  Breakfast of cereal, or eggs, toast, fruit - orange or grapes  Lunch - sandwich with bologna or ham, butter, sometimes cheese  Dinner/supper - meat (chicken, pork chop, hamburger) peas or green beans    Calcium with glass of milk in morning. Water, sometimes pop, no juice, no coffee or tea.  Does not limit fluid  No added salt.    No leg edema, ascites controlled since last TIPS revision in Jan 2022.  Does not monitor BP at home  No lightheadedness or dizziness with standing  Has episodes of sore left arm after doing something and laying down.  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  Energy is up and down, appetite is good. No dyspnea, no chest pain, no fever or chills, no vomiting.    Review of Systems:   Pertinent items are  noted in HPI or as below, remainder of complete ROS is negative.      Active Medications:   Current Outpatient Medications   Medication Sig Dispense Refill     acetaminophen (TYLENOL) 500 MG tablet Take 1,000 mg by mouth every 6 hours as needed for mild pain        Ascorbic Acid (VITAMIN C PO) Take by mouth daily       baclofen (LIORESAL) 10 MG tablet Take 1 tablet (10 mg) by mouth 3 times daily as needed for muscle spasms 60 tablet 1     eplerenone (INSPRA) 50 MG tablet Take 2 tablets (100 mg) by mouth 2 times daily (Patient taking differently: Take 100 mg by mouth 2 times daily Patient taking as ordered as of 8/16/22.) 340 tablet 3     fexofenadine (ALLEGRA) 60 MG tablet Take 1 tablet (60 mg) by mouth daily 30 tablet 1     furosemide (LASIX) 20 MG tablet Take 1 tablet (20 mg) by mouth every evening 90 tablet 3     furosemide (LASIX) 40 MG tablet Take 1 tablet (40 mg) by mouth every morning 90 tablet 3     gabapentin (NEURONTIN) 300 MG capsule Increase as directed to a maximum of 900 mg TID (Patient not taking: Reported on 10/14/2022) 270 capsule 0     hydrOXYzine (VISTARIL) 50 MG capsule Take 1 capsule (50 mg) by mouth at bedtime as needed, may repeat once (For anxiety and difficulty sleeping) 60 capsule 2     lactulose (CHRONULAC) 10 GM/15ML solution TAKE 30MLS BY MOUTH THREE TIMES DAILY AS NEEDED FOR CONSTIPATION (TAKE AS NEEDED TO MAINTAIN 3 TO 4 BOWEL MOVEMENTS DAILY) 2000 mL 11     Menaquinone-7 (VITAMIN K2) 100 MCG CAPS Take 200 mcg by mouth daily        MULTIPLE VITAMINS PO Take 1 tablet by mouth daily       potassium chloride ER (KLOR-CON M) 20 MEQ CR tablet Take 2 tablets (40 mEq) by mouth daily 120 tablet 2     rifaximin (XIFAXAN) 550 MG TABS tablet Take 1 tablet (550 mg) by mouth 2 times daily 180 tablet 3     sertraline (ZOLOFT) 50 MG tablet TAKE ONE-HALF TABLET BY MOUTH EVERY DAY 45 tablet 3     sildenafil (REVATIO) 20 MG tablet Take 3-5 tabs 1/2-4 hours to relations 30 tablet 1     sodium bicarbonate  650 MG tablet Take 1 tablet (650 mg) by mouth 2 times daily 180 tablet 3     traZODone (DESYREL) 50 MG tablet Take 1-2 tablets ( mg) by mouth nightly as needed for sleep 60 tablet 2     vitamin D3 (CHOLECALCIFEROL) 2000 units (50 mcg) tablet Take 1 tablet by mouth daily           Allergies:   Prednisone; Trazodone; Benadryl [diphenhydramine]; and Oxycodone      Problem list:     Patient Active Problem List   Diagnosis     Hypertriglyceridemia     Gouty arthropathy     Erectile dysfunction     CARDIOVASCULAR SCREENING; LDL GOAL LESS THAN 130     Hypertension goal BP (blood pressure) < 140/90     Alcoholic cirrhosis of liver with ascites (H)     Nephrolithiasis     CKD (chronic kidney disease) stage 2, GFR 60-89 ml/min     Seasonal allergic rhinitis, unspecified trigger     Thrombocytopenia (H)     Cervicalgia      Past Surgical History:  Past Surgical History:   Procedure Laterality Date     ANKLE SURGERY Left      ANKLE SURGERY       ARTHROSCOPY KNEE       COLONOSCOPY N/A 03/31/2016    Procedure: COLONOSCOPY;  Surgeon: Rhys Uriostegui MD;  Location: [facility]     COMBINED CYSTOSCOPY, RETROGRADES, URETEROSCOPY, LASER HOLMIUM LITHOTRIPSY URETER(S), INSERT STENT Bilateral 7/1/2022    Procedure: CYSTOSCOPY, RIGHT RETROGRADE PYELOGRAM, RIGHT URETEROSCOPY WITH LASER LITHOTRIPSY AND BASKET REMOVAL OF STONE, RIGHT URETERAL STENT PLACEMENT, LEFT RETROGRADE PYELOGRAM, LEFT URETEROSCOPY WITH LASER LITHOTRIPSY AND BASKET REMOVAL OF STONE, LEFT URETERAL STENT PLACEMENT;  Surgeon: Dylan Galaviz MD;  Location:  OR     COMBINED CYSTOSCOPY, RETROGRADES, URETEROSCOPY, LASER HOLMIUM LITHOTRIPSY URETER(S), INSERT STENT Bilateral 7/27/2022    Procedure: CYSTOSCOPY, BILATERAL URETERAL STENT REMOVAL, BILATERAL  RETROGRADE PYELOGRAM, BILATERAL URETEROSCOPY. HOLMIUM LASER LITHOTRIPSY LEFT SIDE.  AND BASKET REMOVAL OF STONES BILATERAL, LEFT  URETERAL STENT PLACEMENT;  Surgeon: Dylan Galaviz MD;  Location:  OR      ESOPHAGOSCOPY, GASTROSCOPY, DUODENOSCOPY (EGD), COMBINED N/A 03/31/2016    Procedure: COMBINED ESOPHAGOSCOPY, GASTROSCOPY, DUODENOSCOPY (EGD);  Surgeon: Rhys Uriostegui MD;  Location:  GI     ESOPHAGOSCOPY, GASTROSCOPY, DUODENOSCOPY (EGD), COMBINED N/A 03/09/2018    Procedure: COMBINED ESOPHAGOSCOPY, GASTROSCOPY, DUODENOSCOPY (EGD), BIOPSY SINGLE OR MULTIPLE;  EGD;  Surgeon: Gonzalo Wahl MD;  Location:  GI     ESOPHAGOSCOPY, GASTROSCOPY, DUODENOSCOPY (EGD), COMBINED N/A 06/07/2019    Procedure: ESOPHAGOGASTRODUODENOSCOPY (EGD);  Surgeon: oGnzalo Wahl MD;  Location:  GI     FOOT SURGERY       IR PARACENTESIS  10/29/2019     IR PARACENTESIS  11/25/2020     IR PARACENTESIS  08/11/2021     IR PARACENTESIS  01/28/2022     IR PARACENTESIS  03/30/2022     IR TRANSVEN INTRAHEPATIC PORTOSYST REV  10/29/2019     IR TRANSVEN INTRAHEPATIC PORTOSYST REV  11/25/2020     IR TRANSVEN INTRAHEPATIC PORTOSYST REV  08/11/2021     IR TRANSVEN INTRAHEPATIC PORTOSYST REV  01/28/2022     IR TRANSVEN INTRAHEPATIC PORTOSYST REV  03/30/2022     KNEE SURGERY Left      KNEE SURGERY Right      RELEASE CARPAL TUNNEL       RELEASE TRIGGER FINGER Right 06/11/2020    Procedure: RELEASE, TRIGGER FINGER, right ring and long finger;  Surgeon: Pascual Valencia MD;  Location: [facility]     SIGMOIDOSCOPY FLEXIBLE N/A 10/31/2017    Procedure: SIGMOIDOSCOPY FLEXIBLE;;  Surgeon: Armaan Adams MD;  Location:  GI     TIPS Procedure  06/06/2018     TIPS PROCEDURE  11/01/2020     ZZC PLASTY KNEE,MED OR LAT COMPARTMT Right 02/19/2021    Procedure: RIGHT UNICOMPARTMENTAL ARTHROPLASTY KNEE MEDIAL;  Surgeon: José Miguel Landaverde MD;  Location: [facility];  Service: Orthopedics        Family History:   Family history is positive for kidney stones in 2 of his children and his father.      Social History:   Former smoker  Hx of alcohol abuse   in winter    Physical Exam:There were no vitals taken for  this visit.  GENERAL APPEARANCE: alert and no distress  Pulmonary: normal work of breathing  NEURO: mentation intact and speech normal     Laboratory:  CMP:  Recent Labs   Lab Test 10/12/22  0712 08/22/22  1148 07/01/22  1016 06/13/22  1042 06/07/22  0955 08/11/21  0856 06/23/21  1805 03/26/21  1422 02/20/21  0621 02/19/21  1029 12/31/19  1612 12/31/19  1516 10/07/19  0740 08/30/19  0819 08/23/19  1131 08/02/19  0708 06/14/19  0800 06/07/19  0644 06/06/19  1749 06/06/19  0609 06/05/19  1655    138 146* 146* 144   < > 148* 145* 138 139   < >  --    < > 142   < > 138   < > 144  --  143 142   POTASSIUM 3.7 3.9 3.8 4.2 3.8   < > 4.0 4.5 4.2 4.1   < >  --    < > 3.2*   < > 4.3   < > 3.4   < > 3.3* 3.4   CHLORIDE 111* 116* 115* 110* 118*   < > 117* 114* 110* 111*   < >  --    < > 116*   < > 110*   < > 120*  --  120* 118*   CO2 25 23 25 29 23   < > 25 29 24 20*   < >  --    < > 19*   < > 22   < > 15*  --  14* 16*   ANIONGAP 7 <1* 6 7 3   < > 6 2* 4* 8   < >  --    < > 7   < > 6   < > 9  --  9 7   GLC 82 93 96 91 70   < > 77 144* 157* 101   < >  --    < > 103*   < > 89   < > 93  --  81 197*   BUN 14.9 12 13 14 13   < > 8 12 15 15   < >  --    < > 10   < > 11   < > 9  --  7 8   CR 1.47* 1.20 1.13 1.34* 1.21   < > 1.09 1.17 1.05 1.07   < >  --    < > 1.04   < > 1.18   < > 1.18  --  1.27* 1.19   GFRESTIMATED 55* 70 75 61 69   < > 75 69 >60 >60   < >  --    < > 80   < > 69   < > 69  --  63 68   GFRESTCONRADACK  --   --   --   --   --   --  86 79 >60 >60   < >  --    < > >90   < > 80   < > 80  --  73 79   JULISSA 9.0 8.2* 8.5 9.2 8.5   < > 8.5 9.0 8.0* 8.4*   < >  --    < > 8.3*   < > 9.0   < > 9.1  --  8.8 8.7   MAG  --   --   --   --   --   --   --   --   --   --   --  1.8  --   --   --   --   --  2.0  --  1.8 1.8   PHOS  --   --   --   --   --   --   --   --   --   --   --   --   --  2.5  --  2.8  --  2.1*  --  2.2* 2.0*   PROTTOTAL 5.6* 5.5*  --  6.0* 5.3*   < > 5.6* 5.7* 5.3* 5.5*   < >  --    < >  --    < >  --    < > 5.7*   --  5.3*  --    ALBUMIN 2.9* 2.2*  --  2.4* 2.1*   < > 2.7* 2.7* 2.5* 2.7*   < >  --    < > 2.5*   < > 2.8*   < > 3.5  --  3.4  --    BILITOTAL 1.6* 2.2*  --  2.1* 1.6*   < > 3.6* 2.5* 2.6* 2.9*   < >  --    < >  --    < >  --    < > 8.0*  --  6.8*  --    ALKPHOS 152* 145  --  178* 150   < > 165* 147 88 111   < >  --    < >  --    < >  --    < > 127  --  111  --    AST 34 32  --  37 34   < > 33 28 36 40   < >  --    < >  --    < >  --    < > 37  --  38  --    ALT 7* 19  --  26 23   < > 19 19 15 15   < >  --    < >  --    < >  --    < > 14  --  14  --     < > = values in this interval not displayed.       CBC:  Recent Labs   Lab Test 10/12/22  0712 08/22/22  1148 06/13/22  1042 06/07/22  0955   HGB 11.1* 10.0* 12.6* 10.0*   WBC 4.0 4.2 4.3 3.9*   RBC 3.86* 3.44* 4.14* 3.26*   HCT 34.4* 31.3* 38.9* 31.6*   MCV 89 91 94 97   MCH 28.8 29.1 30.4 30.7   MCHC 32.3 31.9 32.4 31.6   RDW 18.7* 16.5* 16.8* 17.2*   PLT 78* 83* 83* 78*       INR:  Recent Labs   Lab Test 10/12/22  0712 08/22/22  1148 06/13/22  1042 04/15/22  0728 03/07/22  0746 02/02/22  1520 01/28/22  0731 09/27/21  1207 08/11/21  0856 11/01/19  2013 10/29/19  1155   INR 1.63* 1.69* 1.65* 1.87*   < > 1.65* 1.57*   < > 1.67*   < > 2.06*   PTT  --   --   --   --   --  41* 40*  --  36  --  44*    < > = values in this interval not displayed.       ABG:  Recent Labs   Lab Test 03/14/19  0602   O2PER 21.0        URINE STUDIES:  Recent Labs   Lab Test 02/03/22  0818 11/01/19  1730 06/06/19  1215 06/05/19  0615   COLOR Yellow Light Red Yellow Yellow   APPEARANCE Clear Slightly Cloudy Clear Clear   URINEGLC Negative Negative Negative Negative   URINEBILI Negative Negative Negative Negative   URINEKETONE Negative Negative Negative Negative   SG 1.015 1.015 1.007 1.007   UBLD Small* Large* Moderate* Moderate*   URINEPH 6.5 7.0 6.5 7.5*   PROTEIN Negative 30* Negative 10*   UROBILINOGEN 1.0  --   --   --    NITRITE Negative Negative Negative Negative   LEUKEST Moderate*  Large* Moderate* Large*   RBCU 5-10* >182* 68* 136*   WBCU * 63* 19* 58*     Recent Labs   Lab Test 01/15/20  0716   UTPG 0.24*       PTH:   Recent Labs   Lab Test 08/02/19  0708   PTHI <7*       IRON STUDIES:   Recent Labs   Lab Test 08/02/19  0708 02/25/16  1630   IRON 113 51   * 180*   IRONSAT 47* 28   JOAQUIN 123 1,306*     Imaging:  reviewed

## 2022-12-13 NOTE — LETTER
2022       RE: Ivan Bell  [id number] [facility] [address]     Dear Colleague,    Thank you for referring your patient, Ivan Bell, to the [facility] [place] at [facility]. Please see a copy of my visit note below.    Ivan is a 57 year old who is being evaluated via a billable video visit.      How would you like to obtain your AVS? MyChart  If the video visit is dropped, the invitation should be resent by: Text to cell phone: [phone]  Will anyone else be joining your video visit? No        Video-Visit Details    Video Start Time: 2:02 PM    Type of service:  Video Visit    Video End Time:2:19 PM    Originating Location (pt. Location): Home        Distant Location (provider location):  Off-site    Platform used for Video Visit: [facility]    Alma Moses MD  2022     Name: Ivan Bell  MRN: [id number]  : 1965  Referring provider: Dylan Galaviz MD     Assessment and Plan:  Ivan Bell is a 57 year old with alcohol related cirrhosis, ascites, HE and recurrent kidney stones.     # Nephrolithiasis - stone type- primarily calcium oxalate - 2022, 2022 stone analysis  Fluid intake limited given cirrhosis and ascites    # history AMBER - creatinine 1.47 with eGFR 55 on 10/12/2022  - creatinine and GFR fluctuate (hemodynamic), creatinine often around 1.3 mg/dL and eGFR often close to 65     # alcohol related cirrhosis with complications of ascites and hepatic encephalopathy  - last visit with Dr. Bales 10/12/2022 and MELD-Na was 17 at that time  - diuretics furosemide 40 mg am and 20 mg pm, has been off eplerenone  - lactulose and rifaximin         24 hour urine asap  CT scan/imaging summer 2023  Return in about 6 months (around 2023).     47 minutes spent on visit, meeting with Ivan T Ray and in chart review and documentation on  date of encounter, 12/13/22      Alma Moses MD  [facility]  Department of Medicine  Division of Nephrology and Hypertension  [employer]  edilma Johnson Web Console     Reason For Visit:   Nephrolithiasis.     HPI:   Ivan Bell is a 57 year old  with PMHx of cirrhosis due to alcohol use, ascites, HE, TIPS in 2018 and revisions 2019, 2020, 2021 and Jan 2022 and kidney stones.  Has passed many stones over the years.     I previously had a visit with Ivan in 2019 and then a brief telephone visit 3/24/2020. Had reviewed options of low salt diet and high fruit and vegetable intake. Limited in terms of some preventative measures due to cirrhosis. Has been on loop diuretic and mineralocorticoid-receptor antagonists (currently on furosemide alone). Has history of hypokalemia requiring potassium supplement and also low bicarb requiring sodium bicarbonate (avoid citrate in patient with liver disease).  April 2022, Ivan was evaluated by Dr. Dylan Galaviz for significant bilateral kidney stone burden. There was significant stone burden on left and staged procedure planned.    7/1/2022 he had bilateral URS and laser lithotripsy with basket stone removal  7/27/2022 - bilateral URS and laser lithotripsy on left  All accessible stone fragments removed  Noted to have several calyces with evidence of stones which were deeper in the parenchyma  Ultrasound for TIPS on 8/22/22 showed bilateral nephrolithiasis    diet is 3 meals per day  Breakfast of cereal, or eggs, toast, fruit - orange or grapes  Lunch - sandwich with bologna or ham, butter, sometimes cheese  Dinner/supper - meat (chicken, pork chop, hamburger) peas or green beans    Calcium with glass of milk in morning. Water, sometimes pop, no juice, no coffee or tea.  Does not limit fluid  No added salt.    No leg edema, ascites controlled since last TIPS revision in Jan 2022.  Does not monitor BP at home  No lightheadedness or dizziness with  standing  Has episodes of sore left arm after doing something and laying down.  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  Energy is up and down, appetite is good. No dyspnea, no chest pain, no fever or chills, no vomiting.    Review of Systems:   Pertinent items are noted in HPI or as below, remainder of complete ROS is negative.      Active Medications:   Current Outpatient Medications   Medication Sig Dispense Refill     acetaminophen (TYLENOL) 500 MG tablet Take 1,000 mg by mouth every 6 hours as needed for mild pain        Ascorbic Acid (VITAMIN C PO) Take by mouth daily       baclofen (LIORESAL) 10 MG tablet Take 1 tablet (10 mg) by mouth 3 times daily as needed for muscle spasms 60 tablet 1     eplerenone (INSPRA) 50 MG tablet Take 2 tablets (100 mg) by mouth 2 times daily (Patient taking differently: Take 100 mg by mouth 2 times daily Patient taking as ordered as of 8/16/22.) 340 tablet 3     fexofenadine (ALLEGRA) 60 MG tablet Take 1 tablet (60 mg) by mouth daily 30 tablet 1     furosemide (LASIX) 20 MG tablet Take 1 tablet (20 mg) by mouth every evening 90 tablet 3     furosemide (LASIX) 40 MG tablet Take 1 tablet (40 mg) by mouth every morning 90 tablet 3     gabapentin (NEURONTIN) 300 MG capsule Increase as directed to a maximum of 900 mg TID (Patient not taking: Reported on 10/14/2022) 270 capsule 0     hydrOXYzine (VISTARIL) 50 MG capsule Take 1 capsule (50 mg) by mouth at bedtime as needed, may repeat once (For anxiety and difficulty sleeping) 60 capsule 2     lactulose (CHRONULAC) 10 GM/15ML solution TAKE 30MLS BY MOUTH THREE TIMES DAILY AS NEEDED FOR CONSTIPATION (TAKE AS NEEDED TO MAINTAIN 3 TO 4 BOWEL MOVEMENTS DAILY) 2000 mL 11     Menaquinone-7 (VITAMIN K2) 100 MCG CAPS Take 200 mcg by mouth daily        MULTIPLE VITAMINS PO Take 1 tablet by mouth daily       potassium chloride ER (KLOR-CON M) 20 MEQ CR tablet Take 2 tablets (40 mEq) by mouth daily 120  tablet 2     rifaximin (XIFAXAN) 550 MG TABS tablet Take 1 tablet (550 mg) by mouth 2 times daily 180 tablet 3     sertraline (ZOLOFT) 50 MG tablet TAKE ONE-HALF TABLET BY MOUTH EVERY DAY 45 tablet 3     sildenafil (REVATIO) 20 MG tablet Take 3-5 tabs 1/2-4 hours to relations 30 tablet 1     sodium bicarbonate 650 MG tablet Take 1 tablet (650 mg) by mouth 2 times daily 180 tablet 3     traZODone (DESYREL) 50 MG tablet Take 1-2 tablets ( mg) by mouth nightly as needed for sleep 60 tablet 2     vitamin D3 (CHOLECALCIFEROL) 2000 units (50 mcg) tablet Take 1 tablet by mouth daily           Allergies:   Prednisone; Trazodone; Benadryl [diphenhydramine]; and Oxycodone      Problem list:     Patient Active Problem List   Diagnosis     Hypertriglyceridemia     Gouty arthropathy     Erectile dysfunction     CARDIOVASCULAR SCREENING; LDL GOAL LESS THAN 130     Hypertension goal BP (blood pressure) < 140/90     Alcoholic cirrhosis of liver with ascites (H)     Nephrolithiasis     CKD (chronic kidney disease) stage 2, GFR 60-89 ml/min     Seasonal allergic rhinitis, unspecified trigger     Thrombocytopenia (H)     Cervicalgia      Past Surgical History:  Past Surgical History:   Procedure Laterality Date     ANKLE SURGERY Left      ANKLE SURGERY       ARTHROSCOPY KNEE       COLONOSCOPY N/A 03/31/2016    Procedure: COLONOSCOPY;  Surgeon: Rhys Uriostegui MD;  Location: [facility]     COMBINED CYSTOSCOPY, RETROGRADES, URETEROSCOPY, LASER HOLMIUM LITHOTRIPSY URETER(S), INSERT STENT Bilateral 7/1/2022    Procedure: CYSTOSCOPY, RIGHT RETROGRADE PYELOGRAM, RIGHT URETEROSCOPY WITH LASER LITHOTRIPSY AND BASKET REMOVAL OF STONE, RIGHT URETERAL STENT PLACEMENT, LEFT RETROGRADE PYELOGRAM, LEFT URETEROSCOPY WITH LASER LITHOTRIPSY AND BASKET REMOVAL OF STONE, LEFT URETERAL STENT PLACEMENT;  Surgeon: Dylan Galaviz MD;  Location:  OR     COMBINED CYSTOSCOPY, RETROGRADES, URETEROSCOPY, LASER HOLMIUM LITHOTRIPSY URETER(S),  INSERT STENT Bilateral 7/27/2022    Procedure: CYSTOSCOPY, BILATERAL URETERAL STENT REMOVAL, BILATERAL  RETROGRADE PYELOGRAM, BILATERAL URETEROSCOPY. HOLMIUM LASER LITHOTRIPSY LEFT SIDE.  AND BASKET REMOVAL OF STONES BILATERAL, LEFT  URETERAL STENT PLACEMENT;  Surgeon: Dylan Galaviz MD;  Location:  OR     ESOPHAGOSCOPY, GASTROSCOPY, DUODENOSCOPY (EGD), COMBINED N/A 03/31/2016    Procedure: COMBINED ESOPHAGOSCOPY, GASTROSCOPY, DUODENOSCOPY (EGD);  Surgeon: Rhys Uriostegui MD;  Location:  GI     ESOPHAGOSCOPY, GASTROSCOPY, DUODENOSCOPY (EGD), COMBINED N/A 03/09/2018    Procedure: COMBINED ESOPHAGOSCOPY, GASTROSCOPY, DUODENOSCOPY (EGD), BIOPSY SINGLE OR MULTIPLE;  EGD;  Surgeon: Gonzalo Wahl MD;  Location:  GI     ESOPHAGOSCOPY, GASTROSCOPY, DUODENOSCOPY (EGD), COMBINED N/A 06/07/2019    Procedure: ESOPHAGOGASTRODUODENOSCOPY (EGD);  Surgeon: Gonzalo Wahl MD;  Location:  GI     FOOT SURGERY       IR PARACENTESIS  10/29/2019     IR PARACENTESIS  11/25/2020     IR PARACENTESIS  08/11/2021     IR PARACENTESIS  01/28/2022     IR PARACENTESIS  03/30/2022     IR TRANSVEN INTRAHEPATIC PORTOSYST REV  10/29/2019     IR TRANSVEN INTRAHEPATIC PORTOSYST REV  11/25/2020     IR TRANSVEN INTRAHEPATIC PORTOSYST REV  08/11/2021     IR TRANSVEN INTRAHEPATIC PORTOSYST REV  01/28/2022     IR TRANSVEN INTRAHEPATIC PORTOSYST REV  03/30/2022     KNEE SURGERY Left      KNEE SURGERY Right      RELEASE CARPAL TUNNEL       RELEASE TRIGGER FINGER Right 06/11/2020    Procedure: RELEASE, TRIGGER FINGER, right ring and long finger;  Surgeon: Pascual Valencia MD;  Location: [facility] OR     SIGMOIDOSCOPY FLEXIBLE N/A 10/31/2017    Procedure: SIGMOIDOSCOPY FLEXIBLE;;  Surgeon: Armaan Adams MD;  Location:  GI     TIPS Procedure  06/06/2018     TIPS PROCEDURE  11/01/2020     ZZC PLASTY KNEE,MED OR LAT COMPARTMT Right 02/19/2021    Procedure: RIGHT UNICOMPARTMENTAL ARTHROPLASTY KNEE MEDIAL;  Surgeon: Saima  José Miguel JOSEPH MD;  Location: [facility];  Service: Orthopedics        Family History:   Family history is positive for kidney stones in 2 of his children and his father.      Social History:   Former smoker  Hx of alcohol abuse   in winter    Physical Exam:There were no vitals taken for this visit.  GENERAL APPEARANCE: alert and no distress  Pulmonary: normal work of breathing  NEURO: mentation intact and speech normal     Laboratory:  CMP:  Recent Labs   Lab Test 10/12/22  0712 08/22/22  1148 07/01/22  1016 06/13/22  1042 06/07/22  0955 08/11/21  0856 06/23/21  1805 03/26/21  1422 02/20/21  0621 02/19/21  1029 12/31/19  1612 12/31/19  1516 10/07/19  0740 08/30/19  0819 08/23/19  1131 08/02/19  0708 06/14/19  0800 06/07/19  0644 06/06/19  1749 06/06/19  0609 06/05/19  1655    138 146* 146* 144   < > 148* 145* 138 139   < >  --    < > 142   < > 138   < > 144  --  143 142   POTASSIUM 3.7 3.9 3.8 4.2 3.8   < > 4.0 4.5 4.2 4.1   < >  --    < > 3.2*   < > 4.3   < > 3.4   < > 3.3* 3.4   CHLORIDE 111* 116* 115* 110* 118*   < > 117* 114* 110* 111*   < >  --    < > 116*   < > 110*   < > 120*  --  120* 118*   CO2 25 23 25 29 23   < > 25 29 24 20*   < >  --    < > 19*   < > 22   < > 15*  --  14* 16*   ANIONGAP 7 <1* 6 7 3   < > 6 2* 4* 8   < >  --    < > 7   < > 6   < > 9  --  9 7   GLC 82 93 96 91 70   < > 77 144* 157* 101   < >  --    < > 103*   < > 89   < > 93  --  81 197*   BUN 14.9 12 13 14 13   < > 8 12 15 15   < >  --    < > 10   < > 11   < > 9  --  7 8   CR 1.47* 1.20 1.13 1.34* 1.21   < > 1.09 1.17 1.05 1.07   < >  --    < > 1.04   < > 1.18   < > 1.18  --  1.27* 1.19   GFRESTIMATED 55* 70 75 61 69   < > 75 69 >60 >60   < >  --    < > 80   < > 69   < > 69  --  63 68   GFRESTBLACK  --   --   --   --   --   --  86 79 >60 >60   < >  --    < > >90   < > 80   < > 80  --  73 79   JULISSA 9.0 8.2* 8.5 9.2 8.5   < > 8.5 9.0 8.0* 8.4*   < >  --    < > 8.3*   < > 9.0   < > 9.1  --  8.8 8.7   MAG  --    --   --   --   --   --   --   --   --   --   --  1.8  --   --   --   --   --  2.0  --  1.8 1.8   PHOS  --   --   --   --   --   --   --   --   --   --   --   --   --  2.5  --  2.8  --  2.1*  --  2.2* 2.0*   PROTTOTAL 5.6* 5.5*  --  6.0* 5.3*   < > 5.6* 5.7* 5.3* 5.5*   < >  --    < >  --    < >  --    < > 5.7*  --  5.3*  --    ALBUMIN 2.9* 2.2*  --  2.4* 2.1*   < > 2.7* 2.7* 2.5* 2.7*   < >  --    < > 2.5*   < > 2.8*   < > 3.5  --  3.4  --    BILITOTAL 1.6* 2.2*  --  2.1* 1.6*   < > 3.6* 2.5* 2.6* 2.9*   < >  --    < >  --    < >  --    < > 8.0*  --  6.8*  --    ALKPHOS 152* 145  --  178* 150   < > 165* 147 88 111   < >  --    < >  --    < >  --    < > 127  --  111  --    AST 34 32  --  37 34   < > 33 28 36 40   < >  --    < >  --    < >  --    < > 37  --  38  --    ALT 7* 19  --  26 23   < > 19 19 15 15   < >  --    < >  --    < >  --    < > 14  --  14  --     < > = values in this interval not displayed.       CBC:  Recent Labs   Lab Test 10/12/22  0712 08/22/22  1148 06/13/22  1042 06/07/22  0955   HGB 11.1* 10.0* 12.6* 10.0*   WBC 4.0 4.2 4.3 3.9*   RBC 3.86* 3.44* 4.14* 3.26*   HCT 34.4* 31.3* 38.9* 31.6*   MCV 89 91 94 97   MCH 28.8 29.1 30.4 30.7   MCHC 32.3 31.9 32.4 31.6   RDW 18.7* 16.5* 16.8* 17.2*   PLT 78* 83* 83* 78*       INR:  Recent Labs   Lab Test 10/12/22  0712 08/22/22  1148 06/13/22  1042 04/15/22  0728 03/07/22  0746 02/02/22  1520 01/28/22  0731 09/27/21  1207 08/11/21  0856 11/01/19  2013 10/29/19  1155   INR 1.63* 1.69* 1.65* 1.87*   < > 1.65* 1.57*   < > 1.67*   < > 2.06*   PTT  --   --   --   --   --  41* 40*  --  36  --  44*    < > = values in this interval not displayed.       ABG:  Recent Labs   Lab Test 03/14/19  0602   O2PER 21.0        URINE STUDIES:  Recent Labs   Lab Test 02/03/22  0818 11/01/19  1730 06/06/19  1215 06/05/19  0615   COLOR Yellow Light Red Yellow Yellow   APPEARANCE Clear Slightly Cloudy Clear Clear   URINEGLC Negative Negative Negative Negative   URINEBILI Negative  Negative Negative Negative   URINEKETONE Negative Negative Negative Negative   SG 1.015 1.015 1.007 1.007   UBLD Small* Large* Moderate* Moderate*   URINEPH 6.5 7.0 6.5 7.5*   PROTEIN Negative 30* Negative 10*   UROBILINOGEN 1.0  --   --   --    NITRITE Negative Negative Negative Negative   LEUKEST Moderate* Large* Moderate* Large*   RBCU 5-10* >182* 68* 136*   WBCU * 63* 19* 58*     Recent Labs   Lab Test 01/15/20  0716   UTPG 0.24*       PTH:   Recent Labs   Lab Test 08/02/19  0708   PTHI <7*       IRON STUDIES:   Recent Labs   Lab Test 08/02/19  0708 02/25/16  1630   IRON 113 51   * 180*   IRONSAT 47* 28   JOAQUIN 123 1,306*     Imaging:  reviewed

## 2022-12-13 NOTE — PATIENT INSTRUCTIONS
Ivan  To prevent kidney stones - I recommend low salt diet (mathewa is high in salt) and getting one serving of high calcium food three times a day with meals. For example, 8 ounces milk with breakfast, 2 ounces cheese at lunch and 8 ounces milk with supper.    Please complete 24 hour urine collection as soon as possible, let us know if you do not receive the collection kit by Dec 26th    CT scan with follow up visit    Return in about 6 months (around 6/13/2023).

## 2022-12-20 ENCOUNTER — MEDICAL CORRESPONDENCE (OUTPATIENT)
Dept: HEALTH INFORMATION MANAGEMENT | Facility: CLINIC | Age: 57
End: 2022-12-20

## 2022-12-27 ENCOUNTER — TELEPHONE (OUTPATIENT)
Dept: MULTI SPECIALTY CLINIC | Facility: CLINIC | Age: 57
End: 2022-12-27

## 2022-12-27 ENCOUNTER — MEDICAL CORRESPONDENCE (OUTPATIENT)
Dept: HEALTH INFORMATION MANAGEMENT | Facility: CLINIC | Age: 57
End: 2022-12-27

## 2023-01-09 ENCOUNTER — HOSPITAL ENCOUNTER (OUTPATIENT)
Dept: PHYSICAL THERAPY | Facility: CLINIC | Age: 58
Discharge: HOME | End: 2023-01-09
Attending: NURSE PRACTITIONER
Payer: COMMERCIAL

## 2023-01-09 DIAGNOSIS — M54.16 LUMBAR RADICULITIS: ICD-10-CM

## 2023-01-09 DIAGNOSIS — M48.061 LUMBAR FORAMINAL STENOSIS: ICD-10-CM

## 2023-01-09 DIAGNOSIS — G89.29 CHRONIC BILATERAL LOW BACK PAIN WITH RIGHT-SIDED SCIATICA: ICD-10-CM

## 2023-01-09 DIAGNOSIS — M54.50 LUMBAR SPINE PAIN: ICD-10-CM

## 2023-01-09 DIAGNOSIS — M54.41 CHRONIC BILATERAL LOW BACK PAIN WITH RIGHT-SIDED SCIATICA: ICD-10-CM

## 2023-01-09 DIAGNOSIS — M51.369 DDD (DEGENERATIVE DISC DISEASE), LUMBAR: ICD-10-CM

## 2023-01-09 PROCEDURE — 97161 PT EVAL LOW COMPLEX 20 MIN: CPT | Mod: GP | Performed by: PHYSICAL THERAPIST

## 2023-01-09 PROCEDURE — 97032 APPL MODALITY 1+ESTIM EA 15: CPT | Mod: GP | Performed by: PHYSICAL THERAPIST

## 2023-01-09 PROCEDURE — 97110 THERAPEUTIC EXERCISES: CPT | Mod: GP | Performed by: PHYSICAL THERAPIST

## 2023-01-09 NOTE — PROGRESS NOTES
01/09/23 0800   General Information   Type of Visit Initial OP Ortho PT Evaluation   Start of Care Date 01/09/23   Referring Physician Nina Montoya CNP   Patient/Family Goals Statement Help back pain   Orders Evaluate and Treat   Orders Comment MEDX   Certification Required? No   Medical Diagnosis Chronic B LBP with R sided sciatica   Surgical/Medical history reviewed Yes   Body Part(s)   Body Part(s) Lumbar Spine/SI   Presentation and Etiology   Pertinent history of current problem (include personal factors and/or comorbidities that impact the POC) Ivan is presenting for Chronic B LBP soreness, uncomfortable. He noted some improvement from the injection with Dr. Nova. He noted his symptoms to worsen in summer of 2022. No injuries noted. R knee replacement in 2021. Sitting pain level: 7/10. Best pain level in the last week 4/10- Relaxing. Worst pain level in the last week: 7-8. Working makes it worse. 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 He notes R radicular  Tingling into R lateral hip. Occasionally goes down the leg further with work. No recent changes in bowel or bladder. He notes therapy in the past but nothing recently. Tries to stretch daily.   Onset date of current episode/exacerbation 10/31/22   Prior Level of Function   Prior Level of Function-Mobility independent   Prior Level of Function-ADLs independent   Current Level of Function   Patient role/employment history A. Employed   Employment Comments self- contractor   Fall Risk Screen   Fall screen completed by PT   Have you fallen 2 or more times in the past year? No   Have you fallen and had an injury in the past year? No   Is patient a fall risk? No   Abuse Screen (yes response referral indicated)   Feels Unsafe at Home or Work/School no   Feels Threatened by Someone no   Does Anyone Try to Keep You From Having Contact with Others or Doing Things Outside Your Home? no   Physical Signs of  Abuse Present no   System Outcome Measures   Outcome Measures   (MAUREEN 44%)   Lumbar Spine/SI Objective Findings   Gait/Locomotion forward leaning and slow and antalgic   Hamstring Flexibility R 45 L 30   Piriformis Flexibility Seated hip IR to 10 degrees   Flexion ROM 32 cm finger tip to floor, pain at end range flexion   Extension ROM major loss, pain but less than flexion   Right Side Bending ROM 59 cm finger tip to floor   Left Side Bending ROM 56 cm finger tip to floor   Hip Flexion (L2) Strength R 4, p L 5   Hip Adduction Strength 4- B   Knee Extension (L3) Strength pain with R knee extension but normal strength   Lumbar/Hip/Knee/Foot Strength Comments remaining 5/5   Crossover SLR negative   Slump Test positive on R   Posture forward leaning   Sensation Testing decreased light touch through LE except for anterior medial shin   Planned Therapy Interventions   Planned Therapy Interventions manual therapy;neuromuscular re-education;joint mobilization;ROM;strengthening;stretching   Planned Modality Interventions   Planned Modality Interventions TENS   Clinical Impression   Criteria for Skilled Therapeutic Interventions Met yes, treatment indicated   PT Diagnosis major flexibility loss of the lumbar spine and hip   Influenced by the following impairments ROM loss and neural tension   Functional limitations due to impairments ability to work   Clinical Presentation Stable/Uncomplicated   Clinical Decision Making (Complexity) Low complexity   Therapy Frequency 2 times/Week   Predicted Duration of Therapy Intervention (days/wks) 12 weeks   Risk & Benefits of therapy have been explained Yes   Patient, Family & other staff in agreement with plan of care Yes   Clinical Impression Comments Ivan is presenting to PT eval with history of Chronic LBP B and R lumabr radicular symptoms worsening since 6/2022. PT eval reveals antalgis gait, greatly reduced lumbar ROM and hip mobility. PT initiated TENS trial this visit and  "initiation of HEP. PT to test on the MEDX next session   Ortho Goal 1   Goal Identifier 1   Goal Description Patient will reduce MAUREEN <30% in 8 weeks for improvement in QOL   Target Date 04/09/23   Ortho Goal 2   Goal Identifier 2   Goal Description Patient will improve lumbar extension strength to 30# in 12 weeks for improvement in functional posture and strength   Target Date 04/09/23   Ortho Goal 3   Goal Identifier 3   Goal Description Patient will be independent with home management and self-care in 12 weeks   Target Date 04/09/23   Total Evaluation Time   PT Arsalan, Low Complexity Minutes (30212) 20     History of Present Illness:  Ivan is presenting for Chronic B LBP soreness, uncomfortable. He noted some improvement from the injection with Dr. Nova. He noted his symptoms to worsen in summer of 2022. No injuries noted. R knee replacement in 2021. Sitting pain level: 7/10. Best pain level in the last week 4/10- Relaxing. Worst pain level in the last week: 7-8. Working makes it worse. 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 He notes R radicular  Tingling into R lateral hip. Occasionally goes down the leg further with work. No recent changes in bowel or bladder. He notes therapy in the past but nothing recently. Tries to stretch daily.     Lumbar MedX Initial testing    AROM (full=  0-72  lumbar)    Max Extension Torque     Flex: ext ratio (ideal 1.4:1)          Date    Lumbar Parameters    Top Dead Center (TDC)    Counterbalance (CB)    Seat Pad    Femur Restraint    Week/Visit    Enter Week/Visit #    Weight (lbs)    Reps (#)    Time    ROM (degrees)    Pain    Flex:Ext ratio      Date 1/9/23   Exercise    Supine LTR X 10 B   Supine piriformis stretch- knees bent on table 2 x 30\"/leg   Supine sciatic nerve glide X 10/leg                                           "

## 2023-01-24 ENCOUNTER — TELEPHONE (OUTPATIENT)
Dept: PHYSICAL THERAPY | Facility: HOSPITAL | Age: 58
End: 2023-01-24

## 2023-01-24 NOTE — PROGRESS NOTES
[facility]    Outpatient Physical Therapy Discharge Note  Patient: Ivan Bell  : 1965    Beginning/End Dates of Reporting Period:  23 to 23, only attended initial visit    Referring Provider: fauzia Montoya CNP    Therapy Diagnosis: Chronic Low Back Pain     Client Self Report: see initial eval    Objective Measurements:           Goals:  Goal Identifier 1   Goal Description Patient will reduce MAUREEN <30% in 8 weeks for improvement in QOL   Target Date 23   Date Met      Progress (detail required for progress note):       Goal Identifier 2   Goal Description Patient will improve lumbar extension strength to 30# in 12 weeks for improvement in functional posture and strength   Target Date 23   Date Met      Progress (detail required for progress note):       Goal Identifier 3   Goal Description Patient will be independent with home management and self-care in 12 weeks   Target Date 23   Date Met      Progress (detail required for progress note):       Goal Identifier     Goal Description     Target Date     Date Met      Progress (detail required for progress note):       Goal Identifier     Goal Description     Target Date     Date Met      Progress (detail required for progress note):       Goal Identifier     Goal Description     Target Date     Date Met      Progress (detail required for progress note):       Goal Identifier     Goal Description     Target Date     Date Met      Progress (detail required for progress note):       Goal Identifier     Goal Description     Target Date     Date Met      Progress (detail required for progress note):             Plan:  Discharge from therapy.    Discharge:    Reason for Discharge: due to attendance.  Cx 4 and NS 1 since initial eval    Equipment Issued:     Discharge Plan: Patient to continue home program.

## 2023-01-24 NOTE — ADDENDUM NOTE
Encounter addended by: Priyanka Cardenas, PT on: 1/24/2023 10:53 AM   Actions taken: Episode resolved, Clinical Note Signed

## 2023-02-14 ENCOUNTER — TELEPHONE (OUTPATIENT)
Dept: MULTI SPECIALTY CLINIC | Facility: CLINIC | Age: 58
End: 2023-02-14

## 2023-02-14 NOTE — CONFIDENTIAL NOTE
Pt called, notified I received a message that he had not reviewed his [employer] message. I asked pt if he had received his kit. Pt states he has. RNCC notified.    Cris Khanna CMA  02/14/23  10:27 AM

## 2023-02-20 ENCOUNTER — PATIENT OUTREACH (OUTPATIENT)
Dept: GASTROENTEROLOGY | Facility: CLINIC | Age: 58
End: 2023-02-20
Payer: COMMERCIAL

## 2023-02-20 NOTE — PROGRESS NOTES
Patient called to obtain number to call for CD assessment, and this was provided: [phone]. Patient denies any fluid retention, bleeding or issues with HE. He continues to deal with chronic back pain, and issues with urine control related to past kidney stones, clots, and bleeding. He does not need any refills at this time. Reviewed upcoming appointment with Dr. Bales including labs and ultrasound on 4/10. Patient has no further questions or concerns at this time.

## 2023-02-28 ENCOUNTER — TELEPHONE (OUTPATIENT)
Dept: BEHAVIORAL HEALTH | Facility: CLINIC | Age: 58
End: 2023-02-28

## 2023-02-28 NOTE — TELEPHONE ENCOUNTER
Pt is a(n) adult (18+ out of HS) Seeking as eval for Adult JOSSE Assessment for evaluation and recommendations. and is interested in Adult Evaluation only - no specific program requested.  Appointment scheduled by:  Patient.  (If patient is self-pay, we much complete a Cost Estimate and save it to the pt chart)  Caller name:  Ivan    Caller phone #: [phone]  If in person, please state reason why:  NA  Brief reason for appt:  Pt reports missing prev appts.     Cost estimate Did not get completed.  Contact information verified/updated: Yes

## 2023-03-11 ENCOUNTER — NURSE TRIAGE (OUTPATIENT)
Dept: NURSING | Facility: CLINIC | Age: 58
End: 2023-03-11
Payer: COMMERCIAL

## 2023-03-11 NOTE — TELEPHONE ENCOUNTER
"Call from patient w/ right eye pain after snow plowing from 3am to 1:30 pm yesterday. He says he has to go plow again today.    Eye is sore, tender to touch, eyelids are swollen, redness on white of his eye, light sensitivity, pain level is 7/10    He reports head cold for last couple weeks    No fever or eye injury.    1:35 pm paged Geoffrey on-call, Dr Blanco, to call [facility] back re: 2nd level triage for eye pain.  Call from Dr. Blanco who recommends ED visit. Patient was informed and agrees to go to ED.    William Davies RN, BSN  Triage Nurse Advisor      Reason for Disposition    MODERATE eye pain or discomfort (e.g., interferes with normal activities or awakens from sleep; more than mild)    Additional Information    Negative: SEVERE eye pain    Negative: [1] Eyelids are very swollen (shut or almost) AND [2] fever    Negative: [1] Eyelid (outer) is very red (or tender to touch) AND [2] fever    Negative: [1] Foreign body sensation (\"feels like something is in there\") AND [2] irrigation didn't help    Negative: Vomiting    Negative: [1] Recent eye surgery AND [2] increasing eye pain    Negative: Patient sounds very sick or weak to the triager    Protocols used: EYE - RED WITHOUT PUS-A-AH      "

## 2023-03-22 ENCOUNTER — TELEPHONE (OUTPATIENT)
Dept: BEHAVIORAL HEALTH | Facility: CLINIC | Age: 58
End: 2023-03-22
Payer: COMMERCIAL

## 2023-03-27 ENCOUNTER — TELEPHONE (OUTPATIENT)
Dept: BEHAVIORAL HEALTH | Facility: CLINIC | Age: 58
End: 2023-03-27

## 2023-03-27 ENCOUNTER — HOSPITAL ENCOUNTER (OUTPATIENT)
Dept: BEHAVIORAL HEALTH | Facility: CLINIC | Age: 58
Discharge: HOME | End: 2023-03-27
Attending: FAMILY MEDICINE | Admitting: FAMILY MEDICINE
Payer: COMMERCIAL

## 2023-03-27 VITALS — HEIGHT: 71 IN | WEIGHT: 175 LBS | BODY MASS INDEX: 24.5 KG/M2

## 2023-03-27 DIAGNOSIS — F10.21 ALCOHOL USE DISORDER, MODERATE, IN SUSTAINED REMISSION (H): ICD-10-CM

## 2023-03-27 PROCEDURE — H0001 ALCOHOL AND/OR DRUG ASSESS: HCPCS | Mod: TEL,95

## 2023-03-27 ASSESSMENT — ANXIETY QUESTIONNAIRES
GAD7 TOTAL SCORE: 2
5. BEING SO RESTLESS THAT IT IS HARD TO SIT STILL: NOT AT ALL
IF YOU CHECKED OFF ANY PROBLEMS ON THIS QUESTIONNAIRE, HOW DIFFICULT HAVE THESE PROBLEMS MADE IT FOR YOU TO DO YOUR WORK, TAKE CARE OF THINGS AT HOME, OR GET ALONG WITH OTHER PEOPLE: NOT DIFFICULT AT ALL
7. FEELING AFRAID AS IF SOMETHING AWFUL MIGHT HAPPEN: NOT AT ALL
GAD7 TOTAL SCORE: 2
2. NOT BEING ABLE TO STOP OR CONTROL WORRYING: NOT AT ALL
3. WORRYING TOO MUCH ABOUT DIFFERENT THINGS: SEVERAL DAYS
4. TROUBLE RELAXING: SEVERAL DAYS
6. BECOMING EASILY ANNOYED OR IRRITABLE: NOT AT ALL
1. FEELING NERVOUS, ANXIOUS, OR ON EDGE: NOT AT ALL

## 2023-03-27 ASSESSMENT — COLUMBIA-SUICIDE SEVERITY RATING SCALE - C-SSRS
2. HAVE YOU ACTUALLY HAD ANY THOUGHTS OF KILLING YOURSELF?: NO
TOTAL  NUMBER OF ABORTED OR SELF INTERRUPTED ATTEMPTS LIFETIME: NO
TOTAL  NUMBER OF INTERRUPTED ATTEMPTS LIFETIME: NO
ATTEMPT LIFETIME: NO
1. HAVE YOU WISHED YOU WERE DEAD OR WISHED YOU COULD GO TO SLEEP AND NOT WAKE UP?: NO
6. HAVE YOU EVER DONE ANYTHING, STARTED TO DO ANYTHING, OR PREPARED TO DO ANYTHING TO END YOUR LIFE?: NO

## 2023-03-27 ASSESSMENT — PATIENT HEALTH QUESTIONNAIRE - PHQ9: SUM OF ALL RESPONSES TO PHQ QUESTIONS 1-9: 3

## 2023-03-27 ASSESSMENT — PAIN SCALES - GENERAL: PAINLEVEL: NO PAIN (0)

## 2023-03-27 NOTE — PROGRESS NOTES
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 assessment:      Elicia Bell(spouse), TEL: [phone]    3/27/23: [facility] left requesting a call back.  3/30/2023:  \"He has not drank any alcohol for four years I believe and I have no concerns about any current use.  I am concerned about how much he is sleeping and if it is Sleep Apnea or Depression.  I do not feel alcohol will be a concern for hi post transplant.\"  Discussed referral to therapy at [facility] and this 's follow up with him to call Intake.    EMR-reviewed.    If court related records were reviewed, summarize here: NA    Information from collateral contacts supported/largely agreed with information from the client and associated risk ratings.    Information in this assessment was obtained from the medical record and provided by patient who is a good historian.      Patient will have open access to their mental health medical record.    Review of Symptoms per patient report:   Substance Use:  No symptoms in the past 6 years    Diagnostic Criteria:   1.)  Substance is often taken in larger amounts or over a longer period than was intended.  Met for:  alcohol.  2.)  There is persistent desire or unsuccessful efforts to cut down or control use of " the substance.  Met for:  alcohol.  3.)  A great deal of time is spent in activities necessary to obtain the substance, use the substance, or recover from its effects.  Met for:  alcohol.  9.)  Use of the substance is continued despite knowledge of having a persistent or recurrent physical or psychological problem that is likely to have been cause or exacerbated by the substance.  Met for:  alcohol.  10.)  Tolerance:  either a need for markedly increased amounts of the substance to achieve the desired effect or a markedly diminished effect with continued use of the dame amount of the substance.  Met for:  alcohol.     As evidenced by self report and criteria, the patient meets the following DSM-5 Diagnoses: (Sustained by DSM-5 Criteria Listed Above)       Alcohol Use Disorder Moderate - 303.90 (F10.20) In sustained remission     Specify if: In early remission:  After full criteria for alcohol/drug use disorder were previously met, none of the criteria for alcohol/drug use disorder have been met for at least 3 months but for less than 12 months (with the exception that Criterion A4,  Craving or a strong desire or urge to use alcohol/drug  may be met).      In sustained remission:   After full criteria for alcohol use disorder were previously met, none of the criteria for alcohol/drug use disorder have been met at any time during a period of 12 months or longer (with the exception that Criterion A4,  Craving or strong desire or urge to use alcohol/drug  may be met).      Specify if:   This additional specifier is used if the individual is in an environment where access to alcohol is restricted.     Mild: Presence of 2-3 symptoms  Moderate: Presence of 4-5 symptoms  Severe: Presence of 6 or more symptoms    Recommendations:     1. Plan for Safety and Risk Management:  Recommended that patient call 911 or go to the local ED should there be a change in any of these risk factors.    Report to child / adult protection  services was NA.     2. JOSSE Referrals:     He had an assessment in June of 2022 and reports difficulty in setting up the recommended therapy appointment with a therapist knowledgeable in addiction.  He states he is willing to do so and asked for a referral to [facility].      Attend 6 individual psychotherapy sessions with a therapist who specializes in addiction/substance use.    Abstain from all mood-altering chemicals unless prescribed by a licensed provider.     Attend one weekly sober support group meeting, such as 12 step based (AA/NA), SMART Recovery, [facility], [facility], [facility]ANT[facility], [place] Recovery Connection meetings.   Patient was informed about the LT support group and if appropriate the Liver Transplant Team can initiate that with him.    Follow all the recommendations of your treatment/medical providers.     It is recommended to continue to provide ongoing PEth/ETG testing per transplant team guidelines.    3. Mental Health Referrals: The pt reports no MH DX.  He has grief and loss in his life and is open to individual therapy with a therapist knowledgeable in addiction.    4. Clinical Substantiation/medical necessity for the above recommendations:      Met with patient individually on 3/27/2023  for a comprehensive assessment including summary of assessment and conclusion, assessment risk and appropriate steps taken, documentation to support the diagnosis, rationale for disposition and appropriate level of care recommended and alternative for treatment options.  Patient stated he has done chemical dependency assessments before but he had issues getting everything set up.  Patient has not had any use since the previous assessments.  During his past assessment on 6/15/2022,  Patient said he tried setting up the individual therapy but he had difficulty and the place would not call him back, he would leave messages, and then nothing would happen.  Patient said he is willing to follow the  recommendations and he wants to, but would like assistance in getting this completed.  Patient was encouraged to discuss his substance use in relation to grief and loss of his brother and his father and how that impacted his drinking.      5. Patient's identified no special considerations needed.     6. Recommendations for treatment focus:       Grief / Loss - loss of brother to alcoholism.  Loss of father.    Alcohol / Substance Use - Alcohol Use Disorder-Moderate reportedly in sustained remission.    Liver Disease and health concerns.    Relapse prevention skills.      7.  Interactive Complexity: No     DAANES Assessment ID: [id number]    Provider Name/ Credentials:  CARA Forte Date: March 27, 2023

## 2023-03-27 NOTE — TELEPHONE ENCOUNTER
Patient have a telephone appt today at 8am. Writer unable to get a hold of patient. Writer left vm with writer's call back number. Included in vm that 8:15am is latest time to check in. Included Intake's number to reschedule if needed.

## 2023-03-27 NOTE — ADDENDUM NOTE
Enc[facility] on: 3/27/2023 3:31 PM   Actions taken: Clinical Note Signed

## 2023-03-30 ENCOUNTER — TELEPHONE (OUTPATIENT)
Dept: BEHAVIORAL HEALTH | Facility: CLINIC | Age: 58
End: 2023-03-30
Payer: COMMERCIAL

## 2023-03-30 DIAGNOSIS — K70.31 ALCOHOLIC CIRRHOSIS OF LIVER WITH ASCITES (H): Primary | ICD-10-CM

## 2023-03-30 NOTE — ADDENDUM NOTE
Encounter addended by: Danyell Baptiste [facility] on: 3/30/2023 12:28 PM   Actions taken: Clinical Note Signed

## 2023-03-31 ENCOUNTER — TELEPHONE (OUTPATIENT)
Dept: BEHAVIORAL HEALTH | Facility: CLINIC | Age: 58
End: 2023-03-31
Payer: COMMERCIAL

## 2023-03-31 NOTE — TELEPHONE ENCOUNTER
Second attempt to contact pt. Jesser left a VM with TC contact info and encouraged a phone call back to schedule initial therapy appointment. Coordinator will jennifer referral as complete.Tracker completed.    Angie Villaseñor  03/31/2023  900    ----- Message from Jolanta Carpenter sent at 3/30/2023  4:36 PM CDT -----  Regarding: bridge adult therapy  Transition Clinic Referral   [place]         Please Check Type of Referral Requested:       __x__THERAPY: The Transition clinic is able to schedule patients without current medical insurance; these patient will be referred to our Social Work Care Coordinator for Medical Insurance              Assistance. We are open for referral for psychotherapy. Patient is referred from:  Outpatient Intake      ____MEDICATION:  Referrals for Medication are ONLY accepted from the following areas (select):                                        Suboxone and Opioid Management Referrals are automatically denied. TC Psychiatry cannot see patient without active medical insurance.   TC Psychiatry cannot accept patient with next level of care scheduled with PCP      GUARDIAN: If your patient is not their own Guardian, please provide the following:    Guardian Name:  Guardian Contact Information (Phone & Email) :  Guardian Address:     FOSTER CARE PROVIDER: If your patient lives at a Licensed Foster Care, please provide the following:    Foster Provider:  Foster Provider Contact Information (Phone & Email):  Foster Provider Address:         Referring Provider Contact Name: Danyell Baptiste; Phone Number: [phone]    Reason for Transition Clinic Referral: Bridge therapy for F10.21 (ICD-10-CM) - Alcohol use disorder, moderate, in sustained remission (H)    Next Level of Care Patient Will Be Transitioned To: [facility]  Provider(s)Rickie Fox  [facility]  Date/Time July 17th, 2023  2pm    What Would Be Helpful from the Transition Clinic: bridge therapy F10.21 (ICD-10-CM) - Alcohol  use disorder, moderate, in sustained remission (H)     Needs: NO    Does Patient Have Access to Technology: no    Patient E-mail Address: [email or web address]    Current Patient Phone Number: [phone];     Clinician Gender Preference (if applicable): NO    Patient location preference: In person    Jolanta Carpenter

## 2023-03-31 NOTE — TELEPHONE ENCOUNTER
First attempt to contact pt.  left a VM with TC contact info and encouraged a phone call back to schedule initial therapy appointment. Jesser will postpone for tomorrow.    Angie Villaseñor  03/30/2023  531    ----- Message from Jolanta Carpenter sent at 3/30/2023  4:36 PM CDT -----  Regarding: bridge adult therapy  Transition Clinic Referral   [place]         Please Check Type of Referral Requested:       __x__THERAPY: The Transition clinic is able to schedule patients without current medical insurance; these patient will be referred to our Social Work Care Coordinator for Medical Insurance              Assistance. We are open for referral for psychotherapy. Patient is referred from:  Outpatient Intake      ____MEDICATION:  Referrals for Medication are ONLY accepted from the following areas (select):                                        Suboxone and Opioid Management Referrals are automatically denied. TC Psychiatry cannot see patient without active medical insurance.   TC Psychiatry cannot accept patient with next level of care scheduled with PCP      GUARDIAN: If your patient is not their own Guardian, please provide the following:    Guardian Name:  Guardian Contact Information (Phone & Email) :  Guardian Address:     FOSTER CARE PROVIDER: If your patient lives at a Licensed Foster Care, please provide the following:    Foster Provider:  Foster Provider Contact Information (Phone & Email):  Foster Provider Address:         Referring Provider Contact Name: Danyell Baptiste; Phone Number: [phone]    Reason for Transition Clinic Referral: Bridge therapy for F10.21 (ICD-10-CM) - Alcohol use disorder, moderate, in sustained remission (H)    Next Level of Care Patient Will Be Transitioned To: [facility]  Provider(s)Rickie Fox  [facility]  Date/Time July 17th, 2023  2pm    What Would Be Helpful from the Transition Clinic: bridge therapy F10.21 (ICD-10-CM) - Alcohol use disorder, moderate, in  sustained remission (H)     Needs: NO    Does Patient Have Access to Technology: no    Patient E-mail Address: [email or web address]    Current Patient Phone Number: [phone];     Clinician Gender Preference (if applicable): NO    Patient location preference: In person    Jolanta Carpenter

## 2023-04-03 DIAGNOSIS — K70.31 ALCOHOLIC CIRRHOSIS OF LIVER WITH ASCITES (H): ICD-10-CM

## 2023-04-03 DIAGNOSIS — L29.9 ITCHING: ICD-10-CM

## 2023-04-03 RX ORDER — SERTRALINE HYDROCHLORIDE 25 MG/1
25 TABLET, FILM COATED ORAL DAILY
Qty: 90 TABLET | Refills: 3 | Status: SHIPPED | OUTPATIENT
Start: 2023-04-03

## 2023-04-03 NOTE — PROGRESS NOTES
Patient states he is overall doing well. He has no concerns or questions. He requested refills of sertraline, which was done. Reviewed upcoming appointments for labs, ultrasound, and office visit with Dr. Bales on 4/10.

## 2023-04-09 ENCOUNTER — HEALTH MAINTENANCE LETTER (OUTPATIENT)
Age: 58
End: 2023-04-09

## 2023-04-10 ENCOUNTER — ANCILLARY PROCEDURE (OUTPATIENT)
Dept: ULTRASOUND IMAGING | Facility: CLINIC | Age: 58
End: 2023-04-10
Attending: INTERNAL MEDICINE
Payer: COMMERCIAL

## 2023-04-10 ENCOUNTER — PATIENT OUTREACH (OUTPATIENT)
Dept: UROLOGY | Facility: CLINIC | Age: 58
End: 2023-04-10

## 2023-04-10 ENCOUNTER — LAB (OUTPATIENT)
Dept: LAB | Facility: CLINIC | Age: 58
End: 2023-04-10
Payer: COMMERCIAL

## 2023-04-10 ENCOUNTER — OFFICE VISIT (OUTPATIENT)
Dept: GASTROENTEROLOGY | Facility: CLINIC | Age: 58
End: 2023-04-10
Attending: INTERNAL MEDICINE
Payer: COMMERCIAL

## 2023-04-10 VITALS
WEIGHT: 184 LBS | HEART RATE: 74 BPM | DIASTOLIC BLOOD PRESSURE: 77 MMHG | OXYGEN SATURATION: 100 % | SYSTOLIC BLOOD PRESSURE: 137 MMHG | BODY MASS INDEX: 25.66 KG/M2

## 2023-04-10 DIAGNOSIS — Z95.828 S/P TIPS (TRANSJUGULAR INTRAHEPATIC PORTOSYSTEMIC SHUNT): ICD-10-CM

## 2023-04-10 DIAGNOSIS — K70.31 ALCOHOLIC CIRRHOSIS OF LIVER WITH ASCITES (H): ICD-10-CM

## 2023-04-10 DIAGNOSIS — K70.30 ALCOHOLIC CIRRHOSIS OF LIVER WITHOUT ASCITES (H): Primary | ICD-10-CM

## 2023-04-10 LAB
ALBUMIN SERPL BCG-MCNC: 2.9 G/DL (ref 3.5–5.2)
ALP SERPL-CCNC: 124 U/L (ref 40–129)
ALT SERPL W P-5'-P-CCNC: <5 U/L (ref 10–50)
ANION GAP SERPL CALCULATED.3IONS-SCNC: 7 MMOL/L (ref 7–15)
AST SERPL W P-5'-P-CCNC: 29 U/L (ref 10–50)
BILIRUB DIRECT SERPL-MCNC: 0.86 MG/DL (ref 0–0.3)
BILIRUB SERPL-MCNC: 2 MG/DL
BUN SERPL-MCNC: 21.1 MG/DL (ref 8–23)
CALCIUM SERPL-MCNC: 9.5 MG/DL (ref 8.6–10)
CHLORIDE SERPL-SCNC: 110 MMOL/L (ref 98–107)
CREAT SERPL-MCNC: 1.7 MG/DL (ref 0.67–1.17)
DEPRECATED HCO3 PLAS-SCNC: 24 MMOL/L (ref 22–29)
ERYTHROCYTE [DISTWIDTH] IN BLOOD BY AUTOMATED COUNT: 15.5 % (ref 10–15)
GFR SERPL CREATININE-BSD FRML MDRD: 46 ML/MIN/1.73M2
GLUCOSE SERPL-MCNC: 95 MG/DL (ref 70–99)
HCT VFR BLD AUTO: 32.7 % (ref 40–53)
HGB BLD-MCNC: 11 G/DL (ref 13.3–17.7)
INR PPP: 1.47 (ref 0.85–1.15)
MCH RBC QN AUTO: 31.6 PG (ref 26.5–33)
MCHC RBC AUTO-ENTMCNC: 33.6 G/DL (ref 31.5–36.5)
MCV RBC AUTO: 94 FL (ref 78–100)
PLATELET # BLD AUTO: 74 10E3/UL (ref 150–450)
POTASSIUM SERPL-SCNC: 4.2 MMOL/L (ref 3.4–5.3)
PROT SERPL-MCNC: 5.6 G/DL (ref 6.4–8.3)
RADIOLOGIST FLAGS: ABNORMAL
RBC # BLD AUTO: 3.48 10E6/UL (ref 4.4–5.9)
SODIUM SERPL-SCNC: 141 MMOL/L (ref 136–145)
WBC # BLD AUTO: 4.4 10E3/UL (ref 4–11)

## 2023-04-10 PROCEDURE — 85027 COMPLETE CBC AUTOMATED: CPT | Performed by: PATHOLOGY

## 2023-04-10 PROCEDURE — 82248 BILIRUBIN DIRECT: CPT | Performed by: PATHOLOGY

## 2023-04-10 PROCEDURE — 93975 VASCULAR STUDY: CPT | Mod: GC | Performed by: STUDENT IN AN ORGANIZED HEALTH CARE EDUCATION/TRAINING PROGRAM

## 2023-04-10 PROCEDURE — 85610 PROTHROMBIN TIME: CPT | Performed by: PATHOLOGY

## 2023-04-10 PROCEDURE — 80053 COMPREHEN METABOLIC PANEL: CPT | Performed by: PATHOLOGY

## 2023-04-10 PROCEDURE — 99214 OFFICE O/P EST MOD 30 MIN: CPT | Performed by: INTERNAL MEDICINE

## 2023-04-10 PROCEDURE — 36415 COLL VENOUS BLD VENIPUNCTURE: CPT | Performed by: PATHOLOGY

## 2023-04-10 PROCEDURE — G0463 HOSPITAL OUTPT CLINIC VISIT: HCPCS | Performed by: INTERNAL MEDICINE

## 2023-04-10 NOTE — NURSING NOTE
Chief Complaint   Patient presents with     RECHECK     Cirrhosis     Blood pressure 137/77, pulse 74, weight 83.5 kg (184 lb), SpO2 100 %.    Rip Shetty on 4/10/2023 at 8:22 AM

## 2023-04-10 NOTE — PROGRESS NOTES
[facility] Hepatology    Follow-up Visit    Follow-up visit for cirrhosis    Subjective:  57 year old male    Cirrhosis  - ETOH  - hx ascites, TIPS placement 5/14/18, 6/6/18; TIPS revision 10/29/19, 11/25/2020, 8/11/2021, 1/28/2022  - hx HE  - hx variceal bleed Oct 2017  - ETOH hepatitis March 2019  - last EGD Jun 2019- diminutive EV, mild portal hypertensive gastropathy  - HCC screening- liver TIPS doppler U/S 4/10/2023     Last visit Oct 2022.  Patient presented to his local ER last month with acute conjunctivitis in the right eye.  No new medications or hospital admissions since last month.    Patient is well today.  He reports that any time he drinks fluids, he needs to urinate almost immediately.  Patient has been having issues with lower back pain.  He will be seeing [facility] soon and may need surgery.    Patient denies jaundice, lower extremity edema, abdominal distension, lethargy or confusion.    Patient denies melena, hematemesis or hematochezia.    Patient denies fevers, sweats or chills.  No COVID-19 infection since last visit.    Weight stable.  Appetite is good.    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  Last alcohol was 2019.  Patient is due to start counseling for AUD next week.  He recently put his elderly mom in a nursing home.        Medical hx Surgical hx   Past Medical History:   Diagnosis Date     Alcoholic cirrhosis of liver (H)      Alcoholic hepatitis 03/2019     Chronic kidney disease     CRF     Depression      Gout      History of transfusion      Hypertension      Hypertriglyceridemia      Left calcaneus fracture 01/09/2006 January 16, 2006: Fell 10 feet from ladder onto left foot on frozen ground on 1/9/06 at home.  Immediate pain and unable to walk- seen at [place] and diagnosed with calcaneus fracture     Nephrolithiasis      Osteoarthritis      Portal vein thrombosis     left occlusion, partial main     Thrombocytopenia (H)       Past Surgical History:   Procedure  Laterality Date     ANKLE SURGERY Left      ANKLE SURGERY       ARTHROSCOPY KNEE       COLONOSCOPY N/A 03/31/2016    Procedure: COLONOSCOPY;  Surgeon: Rhys Uriostegui MD;  Location:  GI     COMBINED CYSTOSCOPY, RETROGRADES, URETEROSCOPY, LASER HOLMIUM LITHOTRIPSY URETER(S), INSERT STENT Bilateral 7/1/2022    Procedure: CYSTOSCOPY, RIGHT RETROGRADE PYELOGRAM, RIGHT URETEROSCOPY WITH LASER LITHOTRIPSY AND BASKET REMOVAL OF STONE, RIGHT URETERAL STENT PLACEMENT, LEFT RETROGRADE PYELOGRAM, LEFT URETEROSCOPY WITH LASER LITHOTRIPSY AND BASKET REMOVAL OF STONE, LEFT URETERAL STENT PLACEMENT;  Surgeon: Dylan Galaviz MD;  Location:  OR     COMBINED CYSTOSCOPY, RETROGRADES, URETEROSCOPY, LASER HOLMIUM LITHOTRIPSY URETER(S), INSERT STENT Bilateral 7/27/2022    Procedure: CYSTOSCOPY, BILATERAL URETERAL STENT REMOVAL, BILATERAL  RETROGRADE PYELOGRAM, BILATERAL URETEROSCOPY. HOLMIUM LASER LITHOTRIPSY LEFT SIDE.  AND BASKET REMOVAL OF STONES BILATERAL, LEFT  URETERAL STENT PLACEMENT;  Surgeon: Dylan Galaviz MD;  Location:  OR     ESOPHAGOSCOPY, GASTROSCOPY, DUODENOSCOPY (EGD), COMBINED N/A 03/31/2016    Procedure: COMBINED ESOPHAGOSCOPY, GASTROSCOPY, DUODENOSCOPY (EGD);  Surgeon: Rhys Uriostegui MD;  Location: [facility]     ESOPHAGOSCOPY, GASTROSCOPY, DUODENOSCOPY (EGD), COMBINED N/A 03/09/2018    Procedure: COMBINED ESOPHAGOSCOPY, GASTROSCOPY, DUODENOSCOPY (EGD), BIOPSY SINGLE OR MULTIPLE;  EGD;  Surgeon: Gonzalo Wahl MD;  Location: [facility]     ESOPHAGOSCOPY, GASTROSCOPY, DUODENOSCOPY (EGD), COMBINED N/A 06/07/2019    Procedure: ESOPHAGOGASTRODUODENOSCOPY (EGD);  Surgeon: Gonzalo Wahl MD;  Location:  GI     FOOT SURGERY       IR PARACENTESIS  10/29/2019     IR PARACENTESIS  11/25/2020     IR PARACENTESIS  08/11/2021     IR PARACENTESIS  01/28/2022     IR PARACENTESIS  03/30/2022     IR TRANSVEN INTRAHEPATIC PORTOSYST REV  10/29/2019     IR TRANSVEN INTRAHEPATIC PORTOSYST REV   11/25/2020     IR TRANSVEN INTRAHEPATIC PORTOSYST REV  08/11/2021     IR TRANSVEN INTRAHEPATIC PORTOSYST REV  01/28/2022     IR TRANSVEN INTRAHEPATIC PORTOSYST REV  03/30/2022     KNEE SURGERY Left      KNEE SURGERY Right      RELEASE CARPAL TUNNEL       RELEASE TRIGGER FINGER Right 06/11/2020    Procedure: RELEASE, TRIGGER FINGER, right ring and long finger;  Surgeon: Pascual Valencia MD;  Location: [facility] OR     SIGMOIDOSCOPY FLEXIBLE N/A 10/31/2017    Procedure: SIGMOIDOSCOPY FLEXIBLE;;  Surgeon: Armaan Adams MD;  Location: [facility] GI     TIPS Procedure  06/06/2018     TIPS PROCEDURE  11/01/2020     ZZC PLASTY KNEE,MED OR LAT COMPARTMT Right 02/19/2021    Procedure: RIGHT UNICOMPARTMENTAL ARTHROPLASTY KNEE MEDIAL;  Surgeon: José Miguel Landaverde MD;  Location: [facility];  Service: Orthopedics          Medications  Prior to Admission medications    Medication Sig Start Date End Date Taking? Authorizing Provider   acetaminophen (TYLENOL) 500 MG tablet Take 1,000 mg by mouth every 6 hours as needed for mild pain    Yes Reported, Patient   Ascorbic Acid (VITAMIN C PO) Take by mouth daily   Yes Reported, Patient   baclofen (LIORESAL) 10 MG tablet Take 1 tablet (10 mg) by mouth 3 times daily as needed for muscle spasms 10/14/22  Yes Zuleyma Murphy PA-C   eplerenone (INSPRA) 50 MG tablet Take 2 tablets (100 mg) by mouth 2 times daily  Patient taking differently: Take 100 mg by mouth 2 times daily Patient taking as ordered as of 8/16/22. 3/7/22  Yes Gonzalo Wahl MD   fexofenadine (ALLEGRA) 60 MG tablet Take 1 tablet (60 mg) by mouth daily 4/17/20  Yes Citlali Morgan PA-C   furosemide (LASIX) 20 MG tablet Take 1 tablet (20 mg) by mouth every evening 10/3/22  Yes Gonzalo Wahl MD   furosemide (LASIX) 40 MG tablet Take 1 tablet (40 mg) by mouth every morning 10/3/22  Yes Gonzalo Wahl MD   lactulose (CHRONULAC) 10 GM/15ML solution TAKE 30MLS BY MOUTH THREE TIMES DAILY AS NEEDED FOR  CONSTIPATION (TAKE AS NEEDED TO MAINTAIN 3 TO 4 BOWEL MOVEMENTS DAILY) 11/16/22  Yes Celestino Verduzco PA-C   Menaquinone-7 (VITAMIN K2) 100 MCG CAPS Take 200 mcg by mouth daily    Yes Reported, Patient   MULTIPLE VITAMINS PO Take 1 tablet by mouth daily   Yes Reported, Patient   potassium chloride ER (KLOR-CON M) 20 MEQ CR tablet Take 2 tablets (40 mEq) by mouth daily 10/14/22  Yes Celestino Verduzco PA-C   rifaximin (XIFAXAN) 550 MG TABS tablet Take 1 tablet (550 mg) by mouth 2 times daily 11/17/22  Yes Celestino Verduzco PA-C   sertraline (ZOLOFT) 25 MG tablet Take 1 tablet (25 mg) by mouth daily 4/3/23  Yes Gonzalo Wahl MD   sodium bicarbonate 650 MG tablet Take 1 tablet (650 mg) by mouth 2 times daily 8/25/22  Yes Alma Moses MD   traZODone (DESYREL) 50 MG tablet Take 1-2 tablets ( mg) by mouth nightly as needed for sleep 11/9/22  Yes Jovi Sanchez PA   vitamin D3 (CHOLECALCIFEROL) 2000 units (50 mcg) tablet Take 1 tablet by mouth daily   Yes Unknown, Entered By History   gabapentin (NEURONTIN) 300 MG capsule Increase as directed to a maximum of 900 mg TID  Patient not taking: Reported on 10/14/2022 8/24/22   Elena Sanders DO   hydrOXYzine (VISTARIL) 50 MG capsule Take 1 capsule (50 mg) by mouth at bedtime as needed, may repeat once (For anxiety and difficulty sleeping)  Patient not taking: Reported on 12/13/2022 11/9/22   Jovi Sanchez PA   sildenafil (REVATIO) 20 MG tablet Take 3-5 tabs 1/2-4 hours to relations  Patient not taking: Reported on 12/13/2022 6/7/22   Abena Acuña MD       Allergies  Allergies   Allergen Reactions     Prednisone Visual Disturbance     Trazodone Visual Disturbance     Benadryl [Diphenhydramine] Other (See Comments)     Delirium (visual and auditory hallucinations)     Oxycodone Other (See Comments)     Delirium and constipation  Delirium and constipation       Review of systems  A 10-point review  of systems was negative    Examination  /77 (BP Location: Right arm, Patient Position: Sitting, Cuff Size: Adult Regular)   Pulse 74   Wt 83.5 kg (184 lb)   SpO2 100%   BMI 25.66 kg/m    Body mass index is 25.66 kg/m .    Gen- well, NAD, A+Ox3, normal color  CVS- RRR  RS- CTA  Abd- non-distended, soft, non-tender, no ascites or organomegaly on palpation or percussion, BS+  Extr- hands normal, no BESSY  Skin- no rash or jaundice  Neuro- no asterixis  Psych- normal mood    Laboratory  Lab Results   Component Value Date     04/10/2023     06/23/2021    POTASSIUM 4.2 04/10/2023    POTASSIUM 3.9 08/22/2022    POTASSIUM 4.0 06/23/2021    CHLORIDE 110 04/10/2023    CHLORIDE 116 08/22/2022    CHLORIDE 117 06/23/2021    CO2 24 04/10/2023    CO2 23 08/22/2022    CO2 25 06/23/2021    BUN 21.1 04/10/2023    BUN 12 08/22/2022    BUN 8 06/23/2021    CR 1.70 04/10/2023    CR 1.09 06/23/2021       Lab Results   Component Value Date    BILITOTAL 2.0 04/10/2023    BILITOTAL 3.6 06/23/2021    ALT <5 04/10/2023    ALT 19 06/23/2021    AST 29 04/10/2023    AST 33 06/23/2021    ALKPHOS 124 04/10/2023    ALKPHOS 165 06/23/2021       Lab Results   Component Value Date    ALBUMIN 2.9 04/10/2023    ALBUMIN 2.2 08/22/2022    ALBUMIN 2.7 06/23/2021    PROTTOTAL 5.6 04/10/2023    PROTTOTAL 5.6 06/23/2021        Lab Results   Component Value Date    WBC 4.4 04/10/2023    WBC 4.3 06/23/2021    HGB 11.0 04/10/2023    HGB 12.1 06/23/2021    MCV 94 04/10/2023    MCV 99 06/23/2021    PLT 74 04/10/2023    PLT 58 06/23/2021       Lab Results   Component Value Date    INR 1.47 04/10/2023    INR 1.79 06/23/2021       Radiology  Liver TIPS doppler U/S 4/10/2023 reviewed    Assessment  57 year old male who presents for follow-up of decompensated ETOH cirrhosis complicated with ascites and hepatic encephalopathy.  MELD-Na= 19 (increased due to serum Cr).  Last ETOH= 2019.  No evidence of ascites with TIPS and current diuretics.  Will  consider reduction in diuretic doses based on elevated serum Cr today.  Will discuss with nephrology regarding elevated Cr and hydronephrosis seen on US today.  No evidence of hepatic encephalopathy.  Patient's liver function is adequate to undergo orthopedic neck surgery if needed.  Up to date with HCC screening.    Plan  1.  Low Na diet  2.  Follow-up on pending US report  3.  Reduce furosemide to 40mg PO Q24  4.  Continue eplerenone at current dose  5.  Continue lactulose and rifaximin  6.  Follow-up in 6 months    Gonzalo Bales MD  Hepatology  [facility]

## 2023-04-10 NOTE — TELEPHONE ENCOUNTER
Per Dr. Nii Miramontes, pt completed abd U/S for HCC screening this morning and new onset left-sided hydronephrosis (new since Aug 2022) and multiple kidney stones on both sides were found.  Serum Cr is a little higher than baseline too. Per Dr. Moses, pt should have a CT ASAP. Pt phoned and agreeable to plan. CT scheduled and team notified.    DILIA Prince  Care Coordinator  [phone]

## 2023-04-10 NOTE — LETTER
4/10/2023         RE: Ivan Bell  [address] [facility] [address]        Dear Colleague,    Thank you for referring your patient, Ivan Bell, to the [facility] [place]. Please see a copy of my visit note below.    [facility] Hepat[facility]    Follow-up Visit    Follow-up visit for cirrhosis    Subjective:  57 year old male    Cirrhosis  - ETOH  - hx ascites, TIPS placement 5/14/18, 6/6/18; TIPS revision 10/29/19, 11/25/2020, 8/11/2021, 1/28/2022  - hx HE  - hx variceal bleed Oct 2017  - ETOH hepatitis March 2019  - last EGD Jun 2019- diminutive EV, mild portal hypertensive gastropathy  - HCC screening- liver TIPS doppler U/S 4/10/2023     Last visit Oct 2022.  Patient presented to his local ER last month with acute conjunctivitis in the right eye.  No new medications or hospital admissions since last month.    Patient is well today.  He reports that any time he drinks fluids, he needs to urinate almost immediately.  Patient has been having issues with lower back pain.  He will be seeing [facility] soon and may need surgery.    Patient denies jaundice, lower extremity edema, abdominal distension, lethargy or confusion.    Patient denies melena, hematemesis or hematochezia.    Patient denies fevers, sweats or chills.  No COVID-19 infection since last visit.    Weight stable.  Appetite is good.    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  Last alcohol was 2019.  Patient is due to start counseling for AUD next week.  He recently put his elderly mom in a nursing home.        Medical hx Surgical hx   Past Medical History:   Diagnosis Date    Alcoholic cirrhosis of liver (H)     Alcoholic hepatitis 03/2019    Chronic kidney disease     CRF    Depression     Gout     History of transfusion     Hypertension     Hypertriglyceridemia     Left calcaneus fracture 01/09/2006 January 16, 2006: Fell 10 feet from ladder onto left foot on frozen ground on 1/9/06 at home.   Immediate pain and unable to walk- seen at [place] and diagnosed with calcaneus fracture    Nephrolithiasis     Osteoarthritis     Portal vein thrombosis     left occlusion, partial main    Thrombocytopenia (H)       Past Surgical History:   Procedure Laterality Date    ANKLE SURGERY Left     ANKLE SURGERY      ARTHROSCOPY KNEE      COLONOSCOPY N/A 03/31/2016    Procedure: COLONOSCOPY;  Surgeon: Rhys Uriostegui MD;  Location: [facility]    COMBINED CYSTOSCOPY, RETROGRADES, URETEROSCOPY, LASER HOLMIUM LITHOTRIPSY URETER(S), INSERT STENT Bilateral 7/1/2022    Procedure: CYSTOSCOPY, RIGHT RETROGRADE PYELOGRAM, RIGHT URETEROSCOPY WITH LASER LITHOTRIPSY AND BASKET REMOVAL OF STONE, RIGHT URETERAL STENT PLACEMENT, LEFT RETROGRADE PYELOGRAM, LEFT URETEROSCOPY WITH LASER LITHOTRIPSY AND BASKET REMOVAL OF STONE, LEFT URETERAL STENT PLACEMENT;  Surgeon: Dylan Galaviz MD;  Location:  OR    COMBINED CYSTOSCOPY, RETROGRADES, URETEROSCOPY, LASER HOLMIUM LITHOTRIPSY URETER(S), INSERT STENT Bilateral 7/27/2022    Procedure: CYSTOSCOPY, BILATERAL URETERAL STENT REMOVAL, BILATERAL  RETROGRADE PYELOGRAM, BILATERAL URETEROSCOPY. HOLMIUM LASER LITHOTRIPSY LEFT SIDE.  AND BASKET REMOVAL OF STONES BILATERAL, LEFT  URETERAL STENT PLACEMENT;  Surgeon: Dylan Galaviz MD;  Location:  OR    ESOPHAGOSCOPY, GASTROSCOPY, DUODENOSCOPY (EGD), COMBINED N/A 03/31/2016    Procedure: COMBINED ESOPHAGOSCOPY, GASTROSCOPY, DUODENOSCOPY (EGD);  Surgeon: Rhys Uriostegui MD;  Location: [facility]    ESOPHAGOSCOPY, GASTROSCOPY, DUODENOSCOPY (EGD), COMBINED N/A 03/09/2018    Procedure: COMBINED ESOPHAGOSCOPY, GASTROSCOPY, DUODENOSCOPY (EGD), BIOPSY SINGLE OR MULTIPLE;  EGD;  Surgeon: Gonzalo Wahl MD;  Location: [facility]    ESOPHAGOSCOPY, GASTROSCOPY, DUODENOSCOPY (EGD), COMBINED N/A 06/07/2019    Procedure: ESOPHAGOGASTRODUODENOSCOPY (EGD);  Surgeon: Gonzalo Wahl MD;  Location:  GI    FOOT SURGERY      IR  PARACENTESIS  10/29/2019    IR PARACENTESIS  11/25/2020    IR PARACENTESIS  08/11/2021    IR PARACENTESIS  01/28/2022    IR PARACENTESIS  03/30/2022    IR TRANSVEN INTRAHEPATIC PORTOSYST REV  10/29/2019    IR TRANSVEN INTRAHEPATIC PORTOSYST REV  11/25/2020    IR TRANSVEN INTRAHEPATIC PORTOSYST REV  08/11/2021    IR TRANSVEN INTRAHEPATIC PORTOSYST REV  01/28/2022    IR TRANSVEN INTRAHEPATIC PORTOSYST REV  03/30/2022    KNEE SURGERY Left     KNEE SURGERY Right     RELEASE CARPAL TUNNEL      RELEASE TRIGGER FINGER Right 06/11/2020    Procedure: RELEASE, TRIGGER FINGER, right ring and long finger;  Surgeon: Pascual Valencia MD;  Location: [facility] OR    SIGMOIDOSCOPY FLEXIBLE N/A 10/31/2017    Procedure: SIGMOIDOSCOPY FLEXIBLE;;  Surgeon: Armaan Adams MD;  Location: [facility] GI    TIPS Procedure  06/06/2018    TIPS PROCEDURE  11/01/2020    ZZC PLASTY KNEE,MED OR LAT COMPARTMT Right 02/19/2021    Procedure: RIGHT UNICOMPARTMENTAL ARTHROPLASTY KNEE MEDIAL;  Surgeon: José Miguel Landaverde MD;  Location: [facility];  Service: Orthopedics          Medications  Prior to Admission medications    Medication Sig Start Date End Date Taking? Authorizing Provider   acetaminophen (TYLENOL) 500 MG tablet Take 1,000 mg by mouth every 6 hours as needed for mild pain    Yes Reported, Patient   Ascorbic Acid (VITAMIN C PO) Take by mouth daily   Yes Reported, Patient   baclofen (LIORESAL) 10 MG tablet Take 1 tablet (10 mg) by mouth 3 times daily as needed for muscle spasms 10/14/22  Yes Zuleyma Murphy PA-C   eplerenone (INSPRA) 50 MG tablet Take 2 tablets (100 mg) by mouth 2 times daily  Patient taking differently: Take 100 mg by mouth 2 times daily Patient taking as ordered as of 8/16/22. 3/7/22  Yes Gonzalo Wahl MD   fexofenadine (ALLEGRA) 60 MG tablet Take 1 tablet (60 mg) by mouth daily 4/17/20  Yes Citlali Morgan PA-C   furosemide (LASIX) 20 MG tablet Take 1 tablet (20 mg) by mouth every evening 10/3/22  Yes Nii Mcintyre  Gonzalo Miramontes MD   furosemide (LASIX) 40 MG tablet Take 1 tablet (40 mg) by mouth every morning 10/3/22  Yes Gonzalo Wahl MD   lactulose (CHRONULAC) 10 GM/15ML solution TAKE 30MLS BY MOUTH THREE TIMES DAILY AS NEEDED FOR CONSTIPATION (TAKE AS NEEDED TO MAINTAIN 3 TO 4 BOWEL MOVEMENTS DAILY) 11/16/22  Yes Celestino Verduzco PA-C   Menaquinone-7 (VITAMIN K2) 100 MCG CAPS Take 200 mcg by mouth daily    Yes Reported, Patient   MULTIPLE VITAMINS PO Take 1 tablet by mouth daily   Yes Reported, Patient   potassium chloride ER (KLOR-CON M) 20 MEQ CR tablet Take 2 tablets (40 mEq) by mouth daily 10/14/22  Yes Celestino Verduzco PA-C   rifaximin (XIFAXAN) 550 MG TABS tablet Take 1 tablet (550 mg) by mouth 2 times daily 11/17/22  Yes Celestino Verduzco PA-C   sertraline (ZOLOFT) 25 MG tablet Take 1 tablet (25 mg) by mouth daily 4/3/23  Yes Gonzalo Wahl MD   sodium bicarbonate 650 MG tablet Take 1 tablet (650 mg) by mouth 2 times daily 8/25/22  Yes Alma Moses MD   traZODone (DESYREL) 50 MG tablet Take 1-2 tablets ( mg) by mouth nightly as needed for sleep 11/9/22  Yes Jovi Sanchez PA   vitamin D3 (CHOLECALCIFEROL) 2000 units (50 mcg) tablet Take 1 tablet by mouth daily   Yes Unknown, Entered By History   gabapentin (NEURONTIN) 300 MG capsule Increase as directed to a maximum of 900 mg TID  Patient not taking: Reported on 10/14/2022 8/24/22   Elena Sanders,    hydrOXYzine (VISTARIL) 50 MG capsule Take 1 capsule (50 mg) by mouth at bedtime as needed, may repeat once (For anxiety and difficulty sleeping)  Patient not taking: Reported on 12/13/2022 11/9/22   Jovi Sanchez PA   sildenafil (REVATIO) 20 MG tablet Take 3-5 tabs 1/2-4 hours to relations  Patient not taking: Reported on 12/13/2022 6/7/22   Abena Acuña MD       Allergies  Allergies   Allergen Reactions    Prednisone Visual Disturbance    Trazodone Visual Disturbance     Benadryl [Diphenhydramine] Other (See Comments)     Delirium (visual and auditory hallucinations)    Oxycodone Other (See Comments)     Delirium and constipation  Delirium and constipation       Review of systems  A 10-point review of systems was negative    Examination  /77 (BP Location: Right arm, Patient Position: Sitting, Cuff Size: Adult Regular)   Pulse 74   Wt 83.5 kg (184 lb)   SpO2 100%   BMI 25.66 kg/m    Body mass index is 25.66 kg/m .    Gen- well, NAD, A+Ox3, normal color  CVS- RRR  RS- CTA  Abd- non-distended, soft, non-tender, no ascites or organomegaly on palpation or percussion, BS+  Extr- hands normal, no BESSY  Skin- no rash or jaundice  Neuro- no asterixis  Psych- normal mood    Laboratory  Lab Results   Component Value Date     04/10/2023     06/23/2021    POTASSIUM 4.2 04/10/2023    POTASSIUM 3.9 08/22/2022    POTASSIUM 4.0 06/23/2021    CHLORIDE 110 04/10/2023    CHLORIDE 116 08/22/2022    CHLORIDE 117 06/23/2021    CO2 24 04/10/2023    CO2 23 08/22/2022    CO2 25 06/23/2021    BUN 21.1 04/10/2023    BUN 12 08/22/2022    BUN 8 06/23/2021    CR 1.70 04/10/2023    CR 1.09 06/23/2021       Lab Results   Component Value Date    BILITOTAL 2.0 04/10/2023    BILITOTAL 3.6 06/23/2021    ALT <5 04/10/2023    ALT 19 06/23/2021    AST 29 04/10/2023    AST 33 06/23/2021    ALKPHOS 124 04/10/2023    ALKPHOS 165 06/23/2021       Lab Results   Component Value Date    ALBUMIN 2.9 04/10/2023    ALBUMIN 2.2 08/22/2022    ALBUMIN 2.7 06/23/2021    PROTTOTAL 5.6 04/10/2023    PROTTOTAL 5.6 06/23/2021        Lab Results   Component Value Date    WBC 4.4 04/10/2023    WBC 4.3 06/23/2021    HGB 11.0 04/10/2023    HGB 12.1 06/23/2021    MCV 94 04/10/2023    MCV 99 06/23/2021    PLT 74 04/10/2023    PLT 58 06/23/2021       Lab Results   Component Value Date    INR 1.47 04/10/2023    INR 1.79 06/23/2021       Radiology  Liver TIPS doppler U/S 4/10/2023 reviewed    Assessment  57 year old male who  presents for follow-up of decompensated ETOH cirrhosis complicated with ascites and hepatic encephalopathy.  MELD-Na= 19 (increased due to serum Cr).  Last ETOH= 2019.  No evidence of ascites with TIPS and current diuretics.  Will consider reduction in diuretic doses based on elevated serum Cr today.  Will discuss with nephrology regarding elevated Cr and hydronephrosis seen on US today.  No evidence of hepatic encephalopathy.  Patient's liver function is adequate to undergo orthopedic neck surgery if needed.  Up to date with HCC screening.    Plan  1.  Low Na diet  2.  Follow-up on pending US report  3.  Reduce furosemide to 40mg PO Q24  4.  Continue eplerenone at current dose  5.  Continue lactulose and rifaximin  6.  Follow-up in 6 months    Gonzalo Bales MD  Hepatology  [facility]

## 2023-04-10 NOTE — PROGRESS NOTES
Assessment:     Diagnoses and all orders for this visit:  Chronic bilateral low back pain with right-sided sciatica  Lumbar radiculitis  Lumbar foraminal stenosis  DDD (degenerative disc disease), lumbar  Lumbar facet arthropathy     Ivan Bell is a 57 year old y.o. male with past medical history significant for hypertriglyceridemia, hypertension, gouty arthropathy, nephrolithiasis, chronic kidney disease stage II, thrombocytopenia, alcoholic cirrhosis of the liver with ascites, cystoscopy with lithotripsy and bilateral ureteral stent placement 7/1/2022 with stent removal 7/27/2022, right total knee replacement who presents today for follow-up regarding:    -Chronic progressive bilateral low back pain with lumbar radiculitis with short-term relief with injections, failed response with medial branch block in the past.  Disc bulging noted at both L5-S1 and L4-5.     Plan:     A shared decision making plan was used. The patient's values and choices were respected. Prior medical records were reviewed today. The following represents what was discussed and decided upon by the provider and the patient.        -DIAGNOSTIC TESTS: Images were personally reviewed and interpreted.   -Ordered updated lumbar spine MRI as well as lumbar flexion-extension x-ray to further evaluate progressive low back pain prior to surgical consultation.  --Lumbar spine MRI 2021 with L4-5 moderate disc height loss with bone spurring with left disc protrusion with left L5 nerve root compression, mild bilateral foraminal stenosis.  L5-S1 moderate disc height loss with facet arthropathy with moderate bilateral foraminal stenosis.    -INTERVENTIONS: No further spine injection recommendations at this time.  Did discuss with patient that if he is a nonsurgical candidate we could consider potential spinal cord stimulator.    -Referral placed to [facility] neurosurgery at this time for surgical opinion.    -MEDICATIONS: No changes to  medications at this time  Discussed side effects of medications and proper use. Patient verbalized understanding.    -PHYSICAL THERAPY: Did advise patient to continue with home exercises from prior physical therapy sessions, he does report that he is continuing these on a regular basis with minimal benefit at this time.  Previously we have discussed MedX program, he defers this option today.    Discussed the importance of core strengthening, ROM, stretching exercises with the patient and how each of these entities is important in decreasing pain.  Explained to the patient that the purpose of physical therapy is to teach the patient a home exercise program.  These exercises need to be performed every day in order to decrease pain and prevent future occurrences of pain.        -PATIENT EDUCATION:  Total time of 32 minutes, on the day of service, spent with the patient, reviewing the chart, placing orders, and documenting.   -Today we also discussed the issues related to the current COVID-19 pandemic, the pros and cons of the current treatment plan, the CDC guidelines such as social distancing, washing the hands, and covering the cough.    -FOLLOW UP: Follow-up [facility] neurosurgery.  Patient is aware that he should have his imagings completed before consult with neurosurgery.  Advised to contact clinic if symptoms worsen or change.    Subjective:     Ivan Bell is a 57 year old male who presents today for follow-up regarding ongoing progressive bilateral low back pain that radiates into bilateral buttock region and posterior thighs with numbness and tingling slightly greater on the right.  Overall symptoms are occurring bilaterally.  Currently his pain is a constant 7/10, 9 at its worst, 5 at its best.  Aggravated with prolonged standing as well as walking, bending and then transitioning back from bending forward to baseline.  Rest does give him some benefit.  Patient denies any recent trips or falls or  balance changes.    -Treatment to Date: No prior spinal surgery   Physical therapy 2021 LBP  Physical therapy lumbar MedX program eval only January 2023     History of lumbar epidural steroid injection [facility] 2021 with benefit  Right L4-5 and L5-S1 TFESI 8/24/2022 with 7 to 8% relief for 2 weeks only.  Bilateral L3, L4, L5 MBB 50% relief at best.  Bilateral L4-5 TFESI 11/2/2022 with short-term relief for 2 weeks only, no lasting benefit.  Preprocedure pain 7/10, post 2/10.     -Medications:  Acetaminophen    Patient Active Problem List   Diagnosis     Hypertriglyceridemia     Gouty arthropathy     Erectile dysfunction     CARDIOVASCULAR SCREENING; LDL GOAL LESS THAN 130     Hypertension goal BP (blood pressure) < 140/90     Alcoholic cirrhosis of liver with ascites (H)     Nephrolithiasis     CKD (chronic kidney disease) stage 2, GFR 60-89 ml/min     Seasonal allergic rhinitis, unspecified trigger     Thrombocytopenia (H)     Cervicalgia     Alcohol use disorder, moderate, in sustained remission (H)       Current Outpatient Medications   Medication     acetaminophen (TYLENOL) 500 MG tablet     Ascorbic Acid (VITAMIN C PO)     baclofen (LIORESAL) 10 MG tablet     eplerenone (INSPRA) 50 MG tablet     fexofenadine (ALLEGRA) 60 MG tablet     furosemide (LASIX) 20 MG tablet     furosemide (LASIX) 40 MG tablet     gabapentin (NEURONTIN) 300 MG capsule     hydrOXYzine (VISTARIL) 50 MG capsule     lactulose (CHRONULAC) 10 GM/15ML solution     Menaquinone-7 (VITAMIN K2) 100 MCG CAPS     MULTIPLE VITAMINS PO     potassium chloride ER (KLOR-CON M) 20 MEQ CR tablet     rifaximin (XIFAXAN) 550 MG TABS tablet     sertraline (ZOLOFT) 25 MG tablet     sildenafil (REVATIO) 20 MG tablet     sodium bicarbonate 650 MG tablet     traZODone (DESYREL) 50 MG tablet     vitamin D3 (CHOLECALCIFEROL) 2000 units (50 mcg) tablet     No current facility-administered medications for this visit.       Allergies   Allergen Reactions      Prednisone Visual Disturbance     Trazodone Visual Disturbance     Benadryl [Diphenhydramine] Other (See Comments)     Delirium (visual and auditory hallucinations)     Oxycodone Other (See Comments)     Delirium and constipation  Delirium and constipation       Past Medical History:   Diagnosis Date     Alcoholic cirrhosis of liver (H)      Alcoholic hepatitis 03/2019     Chronic kidney disease     CRF     Depression      Gout      History of transfusion      Hypertension      Hypertriglyceridemia      Left calcaneus fracture 01/09/2006 January 16, 2006: Fell 10 feet from ladder onto left foot on frozen ground on 1/9/06 at home.  Immediate pain and unable to walk- seen at [place] and diagnosed with calcaneus fracture     Nephrolithiasis      Osteoarthritis      Portal vein thrombosis     left occlusion, partial main     Thrombocytopenia (H)         Review of Systems  ROS:  Specifically negative for bowel/bladder dysfunction, balance changes, headache, dizziness, foot drop, fevers, chills, appetite changes, nausea/vomiting, unexplained weight loss. Otherwise 13 systems reviewed are negative. Please see the patient's intake questionnaire from today for details.    Reviewed Social, Family, Past Medical and Past Surgical history with patient, no significant changes noted since prior visit.     Objective:     PHYSICAL EXAMINATION:    --CONSTITUTIONAL: Well developed, well nourished, healthy appearing individual.  --PSYCHIATRIC: Appropriate mood and affect. No difficulty interacting due to temper, social withdrawal, or memory issues.  --SKIN: Lumbar region is dry and intact.   --RESPIRATORY: Normal rhythm and effort. No abnormal accessory muscle breathing patterns noted.   --MUSCULOSKELETAL:  Normal lumbar lordosis noted, no lateral shift.  --GROSS MOTOR: Easily arises from a seated position. Gait is non-antalgic  --LUMBAR SPINE:  Inspection reveals no evidence of deformity.     RESULTS:   Imaging: Spine imaging was  reviewed today. The images were shown to the patient and the findings were explained using a spine model.      Lumbar spine MRI reviewed from 2021

## 2023-04-11 ENCOUNTER — OFFICE VISIT (OUTPATIENT)
Dept: PHYSICAL MEDICINE AND REHAB | Facility: CLINIC | Age: 58
End: 2023-04-11
Payer: COMMERCIAL

## 2023-04-11 VITALS
BODY MASS INDEX: 25.62 KG/M2 | WEIGHT: 183 LBS | DIASTOLIC BLOOD PRESSURE: 68 MMHG | SYSTOLIC BLOOD PRESSURE: 152 MMHG | HEIGHT: 71 IN

## 2023-04-11 DIAGNOSIS — M47.816 LUMBAR FACET ARTHROPATHY: ICD-10-CM

## 2023-04-11 DIAGNOSIS — M54.41 CHRONIC BILATERAL LOW BACK PAIN WITH BILATERAL SCIATICA: Primary | ICD-10-CM

## 2023-04-11 DIAGNOSIS — M48.061 LUMBAR FORAMINAL STENOSIS: ICD-10-CM

## 2023-04-11 DIAGNOSIS — M54.16 LUMBAR RADICULITIS: ICD-10-CM

## 2023-04-11 DIAGNOSIS — G89.29 CHRONIC BILATERAL LOW BACK PAIN WITH BILATERAL SCIATICA: Primary | ICD-10-CM

## 2023-04-11 DIAGNOSIS — M54.42 CHRONIC BILATERAL LOW BACK PAIN WITH BILATERAL SCIATICA: Primary | ICD-10-CM

## 2023-04-11 DIAGNOSIS — M51.369 DDD (DEGENERATIVE DISC DISEASE), LUMBAR: ICD-10-CM

## 2023-04-11 PROCEDURE — 99214 OFFICE O/P EST MOD 30 MIN: CPT | Performed by: NURSE PRACTITIONER

## 2023-04-11 ASSESSMENT — PAIN SCALES - GENERAL: PAINLEVEL: SEVERE PAIN (7)

## 2023-04-11 NOTE — LETTER
4/11/2023         RE: Ivan Bell  [address] [facility] [address]        Dear Colleague,    Thank you for referring your patient, Ivan Bell, to the [facility]. Please see a copy of my visit note below.      Assessment:     Diagnoses and all orders for this visit:  Chronic bilateral low back pain with right-sided sciatica  Lumbar radiculitis  Lumbar foraminal stenosis  DDD (degenerative disc disease), lumbar  Lumbar facet arthropathy     Ivan Bell is a 57 year old y.o. male with past medical history significant for hypertriglyceridemia, hypertension, gouty arthropathy, nephrolithiasis, chronic kidney disease stage II, thrombocytopenia, alcoholic cirrhosis of the liver with ascites, cystoscopy with lithotripsy and bilateral ureteral stent placement 7/1/2022 with stent removal 7/27/2022, right total knee replacement who presents today for follow-up regarding:    -Chronic progressive bilateral low back pain with lumbar radiculitis with short-term relief with injections, failed response with medial branch block in the past.  Disc bulging noted at both L5-S1 and L4-5.     Plan:     A shared decision making plan was used. The patient's values and choices were respected. Prior medical records were reviewed today. The following represents what was discussed and decided upon by the provider and the patient.        -DIAGNOSTIC TESTS: Images were personally reviewed and interpreted.   -Ordered updated lumbar spine MRI as well as lumbar flexion-extension x-ray to further evaluate progressive low back pain prior to surgical consultation.  --Lumbar spine MRI 2021 with L4-5 moderate disc height loss with bone spurring with left disc protrusion with left L5 nerve root compression, mild bilateral foraminal stenosis.  L5-S1 moderate disc height loss with facet arthropathy with moderate bilateral foraminal stenosis.    -INTERVENTIONS: No further spine injection recommendations at  this time.  Did discuss with patient that if he is a nonsurgical candidate we could consider potential spinal cord stimulator.    -Referral placed to [facility] neurosurgery at this time for surgical opinion.    -MEDICATIONS: No changes to medications at this time  Discussed side effects of medications and proper use. Patient verbalized understanding.    -PHYSICAL THERAPY: Did advise patient to continue with home exercises from prior physical therapy sessions, he does report that he is continuing these on a regular basis with minimal benefit at this time.  Previously we have discussed MedX program, he defers this option today.    Discussed the importance of core strengthening, ROM, stretching exercises with the patient and how each of these entities is important in decreasing pain.  Explained to the patient that the purpose of physical therapy is to teach the patient a home exercise program.  These exercises need to be performed every day in order to decrease pain and prevent future occurrences of pain.        -PATIENT EDUCATION:  Total time of 32 minutes, on the day of service, spent with the patient, reviewing the chart, placing orders, and documenting.   -Today we also discussed the issues related to the current COVID-19 pandemic, the pros and cons of the current treatment plan, the CDC guidelines such as social distancing, washing the hands, and covering the cough.    -FOLLOW UP: Follow-up [facility] neurosurgery.  Patient is aware that he should have his imagings completed before consult with neurosurgery.  Advised to contact clinic if symptoms worsen or change.    Subjective:     Ivan Bell is a 57 year old male who presents today for follow-up regarding ongoing progressive bilateral low back pain that radiates into bilateral buttock region and posterior thighs with numbness and tingling slightly greater on the right.  Overall symptoms are occurring bilaterally.  Currently his pain is a  constant 7/10, 9 at its worst, 5 at its best.  Aggravated with prolonged standing as well as walking, bending and then transitioning back from bending forward to baseline.  Rest does give him some benefit.  Patient denies any recent trips or falls or balance changes.    -Treatment to Date: No prior spinal surgery   Physical therapy 2021 LBP  Physical therapy lumbar MedX program eval only January 2023     History of lumbar epidural steroid injection [facility] 2021 with benefit  Right L4-5 and L5-S1 TFESI 8/24/2022 with 7 to 8% relief for 2 weeks only.  Bilateral L3, L4, L5 MBB 50% relief at best.  Bilateral L4-5 TFESI 11/2/2022 with short-term relief for 2 weeks only, no lasting benefit.  Preprocedure pain 7/10, post 2/10.     -Medications:  Acetaminophen    Patient Active Problem List   Diagnosis     Hypertriglyceridemia     Gouty arthropathy     Erectile dysfunction     CARDIOVASCULAR SCREENING; LDL GOAL LESS THAN 130     Hypertension goal BP (blood pressure) < 140/90     Alcoholic cirrhosis of liver with ascites (H)     Nephrolithiasis     CKD (chronic kidney disease) stage 2, GFR 60-89 ml/min     Seasonal allergic rhinitis, unspecified trigger     Thrombocytopenia (H)     Cervicalgia     Alcohol use disorder, moderate, in sustained remission (H)       Current Outpatient Medications   Medication     acetaminophen (TYLENOL) 500 MG tablet     Ascorbic Acid (VITAMIN C PO)     baclofen (LIORESAL) 10 MG tablet     eplerenone (INSPRA) 50 MG tablet     fexofenadine (ALLEGRA) 60 MG tablet     furosemide (LASIX) 20 MG tablet     furosemide (LASIX) 40 MG tablet     gabapentin (NEURONTIN) 300 MG capsule     hydrOXYzine (VISTARIL) 50 MG capsule     lactulose (CHRONULAC) 10 GM/15ML solution     Menaquinone-7 (VITAMIN K2) 100 MCG CAPS     MULTIPLE VITAMINS PO     potassium chloride ER (KLOR-CON M) 20 MEQ CR tablet     rifaximin (XIFAXAN) 550 MG TABS tablet     sertraline (ZOLOFT) 25 MG tablet     sildenafil (REVATIO) 20 MG  tablet     sodium bicarbonate 650 MG tablet     traZODone (DESYREL) 50 MG tablet     vitamin D3 (CHOLECALCIFEROL) 2000 units (50 mcg) tablet     No current facility-administered medications for this visit.       Allergies   Allergen Reactions     Prednisone Visual Disturbance     Trazodone Visual Disturbance     Benadryl [Diphenhydramine] Other (See Comments)     Delirium (visual and auditory hallucinations)     Oxycodone Other (See Comments)     Delirium and constipation  Delirium and constipation       Past Medical History:   Diagnosis Date     Alcoholic cirrhosis of liver (H)      Alcoholic hepatitis 03/2019     Chronic kidney disease     CRF     Depression      Gout      History of transfusion      Hypertension      Hypertriglyceridemia      Left calcaneus fracture 01/09/2006 January 16, 2006: Fell 10 feet from ladder onto left foot on frozen ground on 1/9/06 at home.  Immediate pain and unable to walk- seen at [place] and diagnosed with calcaneus fracture     Nephrolithiasis      Osteoarthritis      Portal vein thrombosis     left occlusion, partial main     Thrombocytopenia (H)         Review of Systems  ROS:  Specifically negative for bowel/bladder dysfunction, balance changes, headache, dizziness, foot drop, fevers, chills, appetite changes, nausea/vomiting, unexplained weight loss. Otherwise 13 systems reviewed are negative. Please see the patient's intake questionnaire from today for details.    Reviewed Social, Family, Past Medical and Past Surgical history with patient, no significant changes noted since prior visit.     Objective:     PHYSICAL EXAMINATION:    --CONSTITUTIONAL: Well developed, well nourished, healthy appearing individual.  --PSYCHIATRIC: Appropriate mood and affect. No difficulty interacting due to temper, social withdrawal, or memory issues.  --SKIN: Lumbar region is dry and intact.   --RESPIRATORY: Normal rhythm and effort. No abnormal accessory muscle breathing patterns noted.    --MUSCULOSKELETAL:  Normal lumbar lordosis noted, no lateral shift.  --GROSS MOTOR: Easily arises from a seated position. Gait is non-antalgic  --LUMBAR SPINE:  Inspection reveals no evidence of deformity.     RESULTS:   Imaging: Spine imaging was reviewed today. The images were shown to the patient and the findings were explained using a spine model.      Lumbar spine MRI reviewed from 2021                        Again, thank you for allowing me to participate in the care of your patient.        Sincerely,        Nina Montoya, CNP

## 2023-04-11 NOTE — PATIENT INSTRUCTIONS
~Please call our [facility] Nurse Navigation line [phone] with any questions or concerns about your treatment plan, if symptoms worsen and you would like to be seen urgently, or if you have problems controlling bladder and bowel function.  ~Please note that any My Chart messages may take multiple days for a response due to the high volume of patients seen in clinic.   Anything sent Thursday night or after will be answered the following week when able, as Nina Montoya CNP does not work in clinic on Fridays.        Importance of specialized Physical Therapy: Discussed the importance of core strengthening, ROM, stretching exercises with the patient and how each of these entities is important in decreasing pain.  Explained to the patient that the purpose of physical therapy is to teach the patient a home exercise program individualized to them and their specific health concerns.  These exercises need to be performed every day in order to decrease pain and prevent future occurrences of pain.           Imaging has been ordered. Radiology will call you to schedule. Please call below if you do not hear from them in the next couple of days.     [facility] Radiology Scheduling    Please call [phone] to schedule your image(s) (select option #1). There are 3 different locations, see below.     [facility]  [address] [address]    [facility] [place]  [address], [address]   [place] [address]    [place] [id number] [facility] [address]       You have been referred to [facility] Neurosurgery for surgical consult.  They will callyou to schedule an appointment at the Spine Center.    Neurosurgery Scheduling phone #: [phone]

## 2023-04-12 ENCOUNTER — ANCILLARY PROCEDURE (OUTPATIENT)
Dept: CT IMAGING | Facility: CLINIC | Age: 58
End: 2023-04-12
Attending: INTERNAL MEDICINE
Payer: COMMERCIAL

## 2023-04-12 DIAGNOSIS — N20.0 RECURRENT KIDNEY STONES: ICD-10-CM

## 2023-04-12 PROCEDURE — 74176 CT ABD & PELVIS W/O CONTRAST: CPT | Performed by: RADIOLOGY

## 2023-04-13 ENCOUNTER — MYC MEDICAL ADVICE (OUTPATIENT)
Dept: UROLOGY | Facility: CLINIC | Age: 58
End: 2023-04-13

## 2023-04-13 ENCOUNTER — TELEPHONE (OUTPATIENT)
Dept: UROLOGY | Facility: CLINIC | Age: 58
End: 2023-04-13

## 2023-04-13 DIAGNOSIS — N20.1 LEFT URETERAL STONE: Primary | ICD-10-CM

## 2023-04-13 RX ORDER — TAMSULOSIN HYDROCHLORIDE 0.4 MG/1
0.4 CAPSULE ORAL DAILY
Qty: 30 CAPSULE | Refills: 1 | Status: CANCELLED | OUTPATIENT
Start: 2023-04-13

## 2023-04-13 NOTE — TELEPHONE ENCOUNTER
Dylan Galaviz MD Berkenes, Melissa, RN  Cc: [facility]  Caller: Unspecified (Today,  9:00 AM)  Damian Henley,     I looked at the CT scan and he has several left ureteral stones.  He should start on tamsulosin 0.4 mg daily and he can get added onto my clinic schedule tomorrow at 8 AM for virtual visit.     Thanks,   Dylan Galaviz M.D.      Received the above message from Dr. Galaviz. Attempted to reach patient by phone, but no answer. Left generic message with request to return call to clinic as soon as possible regarding the need for a video visit tomorrow at 8:00am and additional recommendations. Requested for patient to return call to clinic as soon as possible. My chart message also sent to patient.     Lory Hoang RN, BSN

## 2023-04-13 NOTE — TELEPHONE ENCOUNTER
4/12/23 CT results available. High priority message sent to Dr. Galaviz with request to review and advise on results.    Lory Hoang RN, BSN

## 2023-04-14 ENCOUNTER — PREP FOR PROCEDURE (OUTPATIENT)
Dept: UROLOGY | Facility: CLINIC | Age: 58
End: 2023-04-14
Payer: COMMERCIAL

## 2023-04-14 DIAGNOSIS — N20.1 LEFT URETERAL STONE: Primary | ICD-10-CM

## 2023-04-14 NOTE — CONFIDENTIAL NOTE
Patient has not yet reviewed my chart message. Attempted to reach patient by phone. See 4/13/23 telephone encounter.    Lory Hoang RN, BSN

## 2023-04-14 NOTE — TELEPHONE ENCOUNTER
Attempted to reach patient by phone, but no answer. Left generic message with request to return call to clinic. When patient returns call, will discuss note below, assist in scheduling a video visit with Dr. Galaviz today 4/14/23 at 8:00am, and discuss patient's preferred pharmacy.    Lory Hoang RN, BSN

## 2023-04-17 ENCOUNTER — HOSPITAL ENCOUNTER (OUTPATIENT)
Dept: MRI IMAGING | Facility: CLINIC | Age: 58
Discharge: HOME | End: 2023-04-17
Attending: NURSE PRACTITIONER
Payer: COMMERCIAL

## 2023-04-17 ENCOUNTER — HOSPITAL ENCOUNTER (OUTPATIENT)
Dept: GENERAL RADIOLOGY | Facility: CLINIC | Age: 58
Discharge: HOME | End: 2023-04-17
Attending: NURSE PRACTITIONER
Payer: COMMERCIAL

## 2023-04-17 DIAGNOSIS — G89.29 CHRONIC BILATERAL LOW BACK PAIN WITH BILATERAL SCIATICA: ICD-10-CM

## 2023-04-17 DIAGNOSIS — M54.16 LUMBAR RADICULITIS: ICD-10-CM

## 2023-04-17 DIAGNOSIS — M48.061 LUMBAR FORAMINAL STENOSIS: ICD-10-CM

## 2023-04-17 DIAGNOSIS — M47.816 LUMBAR FACET ARTHROPATHY: ICD-10-CM

## 2023-04-17 DIAGNOSIS — M51.369 DDD (DEGENERATIVE DISC DISEASE), LUMBAR: ICD-10-CM

## 2023-04-17 DIAGNOSIS — M54.42 CHRONIC BILATERAL LOW BACK PAIN WITH BILATERAL SCIATICA: ICD-10-CM

## 2023-04-17 DIAGNOSIS — M54.41 CHRONIC BILATERAL LOW BACK PAIN WITH BILATERAL SCIATICA: ICD-10-CM

## 2023-04-17 PROCEDURE — 72110 X-RAY EXAM L-2 SPINE 4/>VWS: CPT

## 2023-04-17 PROCEDURE — 72148 MRI LUMBAR SPINE W/O DYE: CPT

## 2023-04-17 NOTE — TELEPHONE ENCOUNTER
Patient called and scheduled with Dr. Galaviz 4/18/23 10 am and is currently taking Flomax as directed.     Betty Acosta LPN on 4/17/2023 at 10:24 AM

## 2023-04-17 NOTE — TELEPHONE ENCOUNTER
[facility] Call Center    Phone Message    May a detailed message be left on voicemail: yes     Reason for Call: Patient returned call. Writer wasn't able to get a hold of anyone on the back line so please call patient back. Thank you.    Action Taken: Message routed to:  Other: [facility]    Travel Screening: Not Applicable

## 2023-04-17 NOTE — TELEPHONE ENCOUNTER
Per chart review, patient has been contacted and is scheduled for in person follow up tomorrow 4/18/23. Per 4/13/23 MyChart encounter, patient is taking flomax as directed.     Lory Hoang RN, BSN

## 2023-04-18 ENCOUNTER — OFFICE VISIT (OUTPATIENT)
Dept: UROLOGY | Facility: CLINIC | Age: 58
End: 2023-04-18
Payer: COMMERCIAL

## 2023-04-18 DIAGNOSIS — N20.1 LEFT URETERAL STONE: Primary | ICD-10-CM

## 2023-04-18 DIAGNOSIS — N20.0 KIDNEY STONE ON LEFT SIDE: ICD-10-CM

## 2023-04-18 LAB
ALBUMIN UR-MCNC: 20 MG/DL
APPEARANCE UR: CLEAR
BACTERIA #/AREA URNS HPF: ABNORMAL /HPF
BILIRUB UR QL STRIP: NEGATIVE
COLOR UR AUTO: YELLOW
GLUCOSE UR STRIP-MCNC: NEGATIVE MG/DL
HGB UR QL STRIP: ABNORMAL
KETONES UR STRIP-MCNC: NEGATIVE MG/DL
LEUKOCYTE ESTERASE UR QL STRIP: ABNORMAL
NITRATE UR QL: NEGATIVE
PH UR STRIP: 6 [PH] (ref 5–7)
RBC #/AREA URNS AUTO: ABNORMAL /HPF
SKIP: ABNORMAL
SP GR UR STRIP: 1.01 (ref 1–1.03)
UROBILINOGEN UR STRIP-MCNC: 2 MG/DL
WBC #/AREA URNS AUTO: ABNORMAL /HPF
WBC CLUMPS #/AREA URNS HPF: PRESENT /HPF

## 2023-04-18 PROCEDURE — 99214 OFFICE O/P EST MOD 30 MIN: CPT | Performed by: UROLOGY

## 2023-04-18 PROCEDURE — 81001 URINALYSIS AUTO W/SCOPE: CPT | Performed by: UROLOGY

## 2023-04-18 PROCEDURE — 87086 URINE CULTURE/COLONY COUNT: CPT | Performed by: UROLOGY

## 2023-04-18 RX ORDER — TAMSULOSIN HYDROCHLORIDE 0.4 MG/1
0.4 CAPSULE ORAL DAILY
Qty: 30 CAPSULE | Refills: 0 | Status: ON HOLD | OUTPATIENT
Start: 2023-04-18 | End: 2023-09-19

## 2023-04-18 NOTE — PROGRESS NOTES
[facility]   CHIEF COMPLAINT   It was my pleasure to see Ivan Bell who is a 57 year old male for follow-up of nephrolithiasis.      HPI   Ivan Bell is a very pleasant 57 year old male     7/1/2022: first stage Ureteroscopy   7/27/2022: second stage Ureteroscopy     TODAY 4/18/2023:  He noted some gradual worsening of left-sided flank pain and discomfort over the last several months  He was recently noted to have an elevated serum creatinine and a CT scan was obtained which demonstrates significant left-sided ureteral stone burden as well as kidney stone burden    PHYSICAL EXAM  Patient is a 57 year old  male   Vitals: There were no vitals taken for this visit.  There is no height or weight on file to calculate BMI.  General Appearance Adult:   Alert, no acute distress, oriented  HENT: throat/mouth:normal, good dentition  Lungs: no respiratory distress, or pursed lip breathing  Heart: No obvious jugular venous distension present  Abdomen: soft, nontender, no organomegaly or masses  Musculoskeltal: extremities normal, no peripheral edema  Skin: no suspicious lesions or rashes  Neuro: Alert, oriented, speech and mentation normal  Psych: affect and mood normal  Gait: Normal    Creatinine   Date Value Ref Range Status   04/10/2023 1.70 (H) 0.67 - 1.17 mg/dL Final   06/23/2021 1.09 0.66 - 1.25 mg/dL Final        IMAGING:  All pertinent imaging reviewed:    All imaging studies reviewed by me.  I personally reviewed these imaging films.  A formal report from radiology will follow.    CT ABD/PEL 4/12/2023:  FINDINGS:  CHEST:  LUNGS:   Incompletely imaged 5 x 5 mm right middle lobe noncalcified pulmonary  nodule (series 2, image 1), unchanged since 2018 and statistically  benign.     MEDIASTINUM:   Mild ventriculomegaly. No pericardial effusion.     ABDOMEN/PELVIS:  LIVER:  Cirrhotic morphology.     STOMACH:   Decompressed which limits evaluation     BILIARY:   No biliary ductal dilation. Large gallstones  measuring up to 3 cm.     PANCREAS:   Subtle fat stranding along the pancreatic head and superior  mesenteric vasculature (series 3, image 174 for example).  Punctate calcifications in the pancreatic head.     SPLEEN:   16 cm splenomegaly.     ADRENAL GLANDS:   Normal.     :  Obstructing left middle and distal ureteral calculi, high density  measuring 1.0 cm superiorly (series 5, image 53 and series 3, image  344) and 1.7 x 0.8 cm inferiorly (series 5, image 53 and series 3,  image 376). Multiple additional smaller calculi. Mild periureteral fat  stranding and thickening of the mid to distal left ureter. Associated  moderate left hydroureteronephrosis.     Bilateral calyceal stones measuring up to 0.9 cm on the right (series  3, image 179) and 0.8 cm on the left (series 3, image 195).  No right hydronephrosis.  Mild bilateral renal atrophy.  No urinary bladder wall thickening.     BOWEL/PERITONEUM:   Normal caliber of the small and large bowel.   Normal appendix.  No pneumoperitoneum. No free fluid.     RETROPERITONEUM:  Normal.     VESSELS:   Limited evaluation on non-contrast exam.  TIPS     LYMPH NODES:   No lymphadenopathy.     BONES/SOFT TISSUES:   No acute osseous abnormality.                                                        IMPRESSION:  1.  Multiple obstructing left mid and distal ureteral calculi  measuring up to 1.7 cm with associated moderate left  hydroureteronephrosis.  2.  Inflammatory changes of the left ureter are likely reactive.  Correlate with urinalysis to exclude infection.  3.  Bilateral nonobstructing calyceal stone.  4.  Stranding of the mesenteric root along the pancreatic head;  suspect due to early volume overload/congestion in the setting of  cirrhosis. However, correlate with patient history and/or lipase to  exclude pancreatitis.  5.  Cirrhosis and sequelae of portal hypertension. Recommend screening  for hepatocellular carcinoma per clinical guidelines.  6.  Gallstones measuring  up to 3 cm. Consider outpatient follow-up  with general surgery.      ASSESSMENT and PLAN  57-year-old man with history of alcoholic cirrhosis status post TI PS and paracentesis with baseline elevated INR.  Noted to have significant bilateral nephrolithiasis.  He is status post a staged ureteroscopy in July 2022.    Left kidney and ureteral stones  - He has been scheduled for ureteroscopy on 4/24/2023  - Order for urinalysis placed today and I will send him the results  - We discussed that there is high likelihood of requiring a staged approach to render his left collecting system stone free given the significant stone burden  - Recommend he continue tamsulosin previously prescribed  - Proceed with surgery on 4/24/2023 as scheduled    Time spent: 20 minutes spent on the date of the encounter doing chart review, history and exam, documentation and further activities as noted above.    Dylan Galaviz MD   Urology  [facility]  [facility] Phone: [phone]  [facility] Phone: [phone]

## 2023-04-18 NOTE — CONFIDENTIAL NOTE
Per chart review, flomax is not listed on patient's med list. flomax ordered per Dr. Galaviz and the 4/13/23 telephone encounter. Prescription sent to [facility] in [place] with request for pharmacy team to update patient on possible interaction with sildenafil.    Lory Hoang RN, BSN

## 2023-04-18 NOTE — NURSING NOTE
Ivan Bell's goals for this visit include:   Chief Complaint   Patient presents with     RECHECK     Appointment to review CT.       He requests these members of his care team be copied on today's visit information:       PCP: [facility] [place]    Referring Provider:  No referring provider defined for this encounter.    There were no vitals taken for this visit.    Do you need any medication refills at today's visit?   Betty Acosta LPN on 4/18/2023 at 9:41 AM

## 2023-04-19 ENCOUNTER — OFFICE VISIT (OUTPATIENT)
Dept: PHYSICAL MEDICINE AND REHAB | Facility: CLINIC | Age: 58
End: 2023-04-19
Payer: COMMERCIAL

## 2023-04-19 VITALS — DIASTOLIC BLOOD PRESSURE: 72 MMHG | SYSTOLIC BLOOD PRESSURE: 150 MMHG

## 2023-04-19 DIAGNOSIS — M54.16 LUMBAR RADICULITIS: ICD-10-CM

## 2023-04-19 DIAGNOSIS — M54.41 CHRONIC BILATERAL LOW BACK PAIN WITH BILATERAL SCIATICA: Primary | ICD-10-CM

## 2023-04-19 DIAGNOSIS — G89.29 CHRONIC BILATERAL LOW BACK PAIN WITH BILATERAL SCIATICA: Primary | ICD-10-CM

## 2023-04-19 DIAGNOSIS — M48.061 LUMBAR FORAMINAL STENOSIS: ICD-10-CM

## 2023-04-19 DIAGNOSIS — M51.369 DDD (DEGENERATIVE DISC DISEASE), LUMBAR: ICD-10-CM

## 2023-04-19 DIAGNOSIS — M54.42 CHRONIC BILATERAL LOW BACK PAIN WITH BILATERAL SCIATICA: Primary | ICD-10-CM

## 2023-04-19 LAB — BACTERIA UR CULT: NORMAL

## 2023-04-19 PROCEDURE — 99214 OFFICE O/P EST MOD 30 MIN: CPT | Performed by: NURSE PRACTITIONER

## 2023-04-19 ASSESSMENT — PAIN SCALES - GENERAL: PAINLEVEL: SEVERE PAIN (7)

## 2023-04-19 NOTE — PATIENT INSTRUCTIONS
~Please call our [facility] Nurse Navigation line [phone] with any questions or concerns about your treatment plan, if symptoms worsen and you would like to be seen urgently, or if you have problems controlling bladder and bowel function.  ~Please note that any My Chart messages may take multiple days for a response due to the high volume of patients seen in clinic.   Anything sent Thursday night or after will be answered the following week when able, as Nina Montoya CNP does not work in clinic on Fridays.        Importance of specialized Physical Therapy: Discussed the importance of core strengthening, ROM, stretching exercises with the patient and how each of these entities is important in decreasing pain.  Explained to the patient that the purpose of physical therapy is to teach the patient a home exercise program individualized to them and their specific health concerns.  These exercises need to be performed every day in order to decrease pain and prevent future occurrences of pain.           An injection has been ordered today to potentially help with your pain symptoms. These injections do not fix what is going on in your back, therefore they typically do not take away the pain completely, however they can many times help improve symptoms. Injections should always be completed along with other modalities such as physical therapy for the best long term outcomes. If injections alone are done, then pain will likely return.     [facility] Injection Requirements    A  is required for all fluoroscopically-guided injections.  Injection appointments may be cancelled if there are signs/symptoms of an active infection or if the patient is being actively treated with antibiotics for a diagnosed infection.  Patients may have their steroid injection cancelled if they have had another steroid injection within 2 weeks.  Diabetic patients will have their blood glucose  levels checked the day of their injection and the appointment will be rescheduled if the blood glucose level is 300 or higher.  Patients with allergies to cortisone, local anesthetics, iodine, or contrast dye should contact the Spine Center to further discuss these considerations.  Patients scheduled for medial branch block diagnostic injections should refrain from taking pain medication the day of the procedure.  The medial branch block injection appointment will be rescheduled if the patient's pain rating is not 5/10 or greater at the time of the procedure.  Patients taking warfarin/Coumadin will have their INR checked the day of the procedure and the procedure may be rescheduled if the INR is greater than 3.0.  Please contact the Spine Center [phone]) if you are taking any prescription blood-thinning medications (warfarin, Plavix, Lovenox, Eliquis, Brilinta, Effient, etc.) as special dosing adjustments may need to be made depending on the type of injection you are scheduled to receive.  It is recommended that you delay having your steroid injection if you have received a flu shot or shingles vaccine within 2 weeks.

## 2023-04-19 NOTE — PROGRESS NOTES
Assessment:     Diagnoses and all orders for this visit:  Chronic bilateral low back pain with bilateral sciatica  -     PAIN Transforaminal RIYA Inj Lumbosacral Tyron; Future  Lumbar radiculitis  -     PAIN Transforaminal RIYA Inj Lumbosacral Tyron; Future  Lumbar foraminal stenosis  DDD (degenerative disc disease), lumbar     Ivankm Bell is a 57 year old y.o. male with past medical history significant for hypertriglyceridemia, hypertension, gouty arthropathy, nephrolithiasis, chronic kidney disease stage II, thrombocytopenia, alcoholic cirrhosis of the liver with ascites, cystoscopy with lithotripsy and bilateral ureteral stent placement 7/1/2022 with stent removal 7/27/2022, right total knee replacement who presents today for follow-up regarding:    -Chronic bilateral low back pain with lumbar radiculitis bilaterally.  Disc bulge at multiple levels, greatest at L5-S1 where there has been progression since previous imaging.     Plan:     A shared decision making plan was used. The patient's values and choices were respected. Prior medical records were reviewed today. The following represents what was discussed and decided upon by the provider and the patient.        -DIAGNOSTIC TESTS: Images were personally reviewed and interpreted.   -- Lumbar spine flexion-extension x-ray 4/17/2023 with mild retrolisthesis L5-S1, no further shifting with flexion extension.  --Lumbar spine MRI 4/17/2023 with RIGHT disc protrusion at L5-S1 with right S1 compression, moderate right facet arthropathy with moderate right foraminal stenosis.  L4-5 with mild bilateral foraminal stenosis.  L3-4 with mild to moderate right and mild left foraminal stenosis with mild central stenosis.  L2-3 mild bilateral foraminal stenosis.  --Lumbar spine MRI 2021 with L4-5 moderate disc height loss with bone spurring with left disc protrusion with left L5 nerve root compression, mild bilateral foraminal stenosis.  L5-S1 moderate disc height loss with  facet arthropathy with moderate bilateral foraminal stenosis.    *Referral placed to [facility] neurosurgery per patient request for surgical opinion on 4/11/2023.    -INTERVENTIONS: Ordered bilateral L5-S1 transforaminal epidural steroid injection to see if we get further relief of chronic bilateral low back pain and radicular pain.  Previous failed response with medial branch block and failed response with bilateral L4-5 TFESI.  -We did discuss that if he does not get relief with this injection I would recommend MedX program and we could always consider surgical evaluation for either surgical intervention options versus spinal cord stimulator options down the road.    -MEDICATIONS: No changes to medications today  Discussed side effects of medications and proper use. Patient verbalized understanding.    -PHYSICAL THERAPY: Advised patient continue with home exercises from prior physical therapy sessions.  Again we did discuss revisiting the MedX program which I do recommend for more intensive core strengthening, patient wants to trial the injection first.  Discussed the importance of core strengthening, ROM, stretching exercises with the patient and how each of these entities is important in decreasing pain.  Explained to the patient that the purpose of physical therapy is to teach the patient a home exercise program.  These exercises need to be performed every day in order to decrease pain and prevent future occurrences of pain.        -PATIENT EDUCATION:  Total time of 32 minutes, on the day of service, spent with the patient, reviewing the chart, placing orders, and documenting.   -Today we also discussed the issues related to the current COVID-19 pandemic, the pros and cons of the current treatment plan, the CDC guidelines such as social distancing, washing the hands, and covering the cough.    -FOLLOW UP: Follow-up injection then 2 weeks postinjection  Advised to contact clinic if symptoms worsen or  change.    Subjective:     Ivan Bell is a 57 year old male who presents today for follow-up regarding ongoing bilateral low back pain that is fairly equal on both sides that radiates into the buttock and posterior and lateral thighs with numbness and tingling.  Currently pain is a 7/10, 9 at its worst, 4 at its best.  Aggravated with generalized activity bending and walking, does improve with resting.  Patient denies any lower extremity weakness.  Denies recent trips or falls or balance changes.  Denies bowel or bladder loss control.    -Treatment to Date: No prior spinal surgery   Physical therapy 2021 LBP  Physical therapy lumbar MedX program eval only January 2023     History of lumbar epidural steroid injection [facility] 2021 with benefit  Right L4-5 and L5-S1 TFESI 8/24/2022 with 7 to 8% relief for 2 weeks only.  Bilateral L3, L4, L5 MBB 50% relief at best.  Bilateral L4-5 TFESI 11/2/2022 with short-term relief for 2 weeks only, no lasting benefit.  Preprocedure pain 7/10, post 2/10.     -Medications:  Acetaminophen    Patient Active Problem List   Diagnosis     Hypertriglyceridemia     Gouty arthropathy     Erectile dysfunction     CARDIOVASCULAR SCREENING; LDL GOAL LESS THAN 130     Hypertension goal BP (blood pressure) < 140/90     Alcoholic cirrhosis of liver with ascites (H)     Nephrolithiasis     CKD (chronic kidney disease) stage 2, GFR 60-89 ml/min     Seasonal allergic rhinitis, unspecified trigger     Thrombocytopenia (H)     Cervicalgia     Alcohol use disorder, moderate, in sustained remission (H)       Current Outpatient Medications   Medication     acetaminophen (TYLENOL) 500 MG tablet     Ascorbic Acid (VITAMIN C PO)     eplerenone (INSPRA) 50 MG tablet     fexofenadine (ALLEGRA) 60 MG tablet     furosemide (LASIX) 20 MG tablet     furosemide (LASIX) 40 MG tablet     hydrOXYzine (VISTARIL) 50 MG capsule     lactulose (CHRONULAC) 10 GM/15ML solution     Menaquinone-7 (VITAMIN K2) 100 MCG  CAPS     MULTIPLE VITAMINS PO     potassium chloride ER (KLOR-CON M) 20 MEQ CR tablet     rifaximin (XIFAXAN) 550 MG TABS tablet     sertraline (ZOLOFT) 25 MG tablet     sildenafil (REVATIO) 20 MG tablet     sodium bicarbonate 650 MG tablet     tamsulosin (FLOMAX) 0.4 MG capsule     traZODone (DESYREL) 50 MG tablet     vitamin D3 (CHOLECALCIFEROL) 2000 units (50 mcg) tablet     No current facility-administered medications for this visit.       Allergies   Allergen Reactions     Prednisone Visual Disturbance     Trazodone Visual Disturbance     Benadryl [Diphenhydramine] Other (See Comments)     Delirium (visual and auditory hallucinations)     Oxycodone Other (See Comments)     Delirium and constipation  Delirium and constipation       Past Medical History:   Diagnosis Date     Alcoholic cirrhosis of liver (H)      Alcoholic hepatitis 03/2019     Chronic kidney disease     CRF     Depression      Gout      History of transfusion      Hypertension      Hypertriglyceridemia      Left calcaneus fracture 01/09/2006 January 16, 2006: Fell 10 feet from ladder onto left foot on frozen ground on 1/9/06 at home.  Immediate pain and unable to walk- seen at [place] and diagnosed with calcaneus fracture     Nephrolithiasis      Osteoarthritis      Portal vein thrombosis     left occlusion, partial main     Thrombocytopenia (H)         Review of Systems  ROS:  Specifically negative for bowel/bladder dysfunction, balance changes, headache, dizziness, foot drop, fevers, chills, appetite changes, nausea/vomiting, unexplained weight loss. Otherwise 13 systems reviewed are negative. Please see the patient's intake questionnaire from today for details.    Reviewed Social, Family, Past Medical and Past Surgical history with patient, no significant changes noted since prior visit.     Objective:     BP (!) 150/72 (BP Location: Right arm, Patient Position: Sitting)     PHYSICAL EXAMINATION:    --CONSTITUTIONAL: Well developed, well  nourished, healthy appearing individual.  --PSYCHIATRIC: Appropriate mood and affect. No difficulty interacting due to temper, social withdrawal, or memory issues.  --SKIN: Lumbar region is dry and intact.   --RESPIRATORY: Normal rhythm and effort. No abnormal accessory muscle breathing patterns noted.   --MUSCULOSKELETAL:  Normal lumbar lordosis noted, no lateral shift.  --GROSS MOTOR: Easily arises from a seated position. Gait is non-antalgic  --LUMBAR SPINE:  Inspection reveals no evidence of deformity.       RESULTS:   Imaging: Spine imaging was reviewed today. The images were shown to the patient and the findings were explained using a spine model.    XR Lumbar Spine G/E 4 Views  Result Date: 4/18/2023  LUMBAR SPINE FOUR OR MORE VIEWS April 17, 2023 1:35 PM HISTORY: Evaluate spondylolisthesis stability. Chronic bilateral low back pain with bilateral sciatica. Lumbar radiculitis. Lumbar foraminal stenosis. DDD (degenerative disc disease), lumbar. Lumbar facet arthropathy. COMPARISON: Lumbar spine MR 4/17/2023.   IMPRESSION: Five lumbar vertebrae. Minimal degenerative retrolisthesis of L5 upon S1 again noted. Alignment otherwise normal. Alignment has not changed appreciably in the flexed or extended positions. Vertebral body heights normal. No fractures. Facet arthropathy throughout the lumbar spine. Loss of disc space height and degenerative endplate spurring at L1-L2, L2-L3 and L3-L4. SIL OCHOA MD   SYSTEM ID:  [id number]    MR Lumbar Spine w/o Contrast  Result Date: 4/18/2023  MRI LUMBAR SPINE WITHOUT CONTRAST April 17, 2023 1:18 PM HISTORY: Chronic bilateral low back pain with bilateral sciatica. Lumbar radiculitis. Lumbar foraminal stenosis. DDD (degenerative disc disease), lumbar. Lumbar facet arthropathy. TECHNIQUE: Multiplanar multisequence MRI of the lumbar spine without contrast. COMPARISON: MRI 6/2/2021. FINDINGS: Alignment is significant for multilevel subtle grade 1 spondylolisthesis including  grade 1 retrolisthesis of L2 on L3, L3 on L4, L4 on L5, and L5 on S1. Bone marrow demonstrates mild low lumbar spine degenerative endplate changes with marrow edema (Modic I) most conspicuous surrounding the L4-L5 and L5-S1 disc joint with probable subtle associated Schmorl's nodes. Conus medullaris is unremarkable terminating at the level of L1-L2 disc. Cauda equina is unremarkable. Left-sided hydronephrosis with hydroureter. Presumed multiple left-sided renal cysts. Segmental Analysis (evolved compared to the prior as detailed): T12-L1: Disc height maintained. Left central extrusion. Moderate bilateral facet arthropathy. No foraminal stenosis. Mild spinal canal stenosis related to accommodation of facet arthropathy in the left central extrusion. Findings are similar compared to the prior. L1-L2: Disc height maintained. No herniation. Mild bilateral facet arthropathy. No foraminal or spinal canal stenosis.  L2-L3: Mild disc height loss. Disc bulge with shallow right central component and broad right lateral protrusion component. Mild bilateral facet arthropathy. Mild bilateral foraminal stenosis. No spinal canal stenosis. L3-L4: Mild disc height loss. Disc bulge with bilateral foraminal components, right larger than left. Mild bilateral facet arthropathy. Mild to moderate right foraminal stenosis. Mild left foraminal stenosis. Mild spinal canal stenosis. L4-L5: Mild disc height loss. Disc bulge with bilateral foraminal components. Previously demonstrated left central protrusion has decreased in size. Mild bilateral facet arthropathy. Mild bilateral foraminal stenoses. Mild spinal canal stenosis with bilateral lateral recess narrowing.  L5-S1: Mild disc height loss. Right central disc protrusion which impinges on the traversing right S1 nerve root within the lateral recess (series 5 image 7), worsened compared to the prior. Moderate right and mild left facet arthropathy. Moderate right foraminal stenosis. Mild left  foraminal stenosis. Mild spinal canal stenosis.   IMPRESSION: Multiple disc herniations and stenoses as detailed. HAYDE DAMON MD   SYSTEM ID:  [id number]    CT Abdomen Pelvis w/o Contrast  Result Date: 4/12/2023  EXAMINATION: CT ABDOMEN PELVIS W/O CONTRAST, 4/12/2023 6:58 AM TECHNIQUE:  Helical CT images from the lung bases through the symphysis pubis were obtained without IV contrast. COMPARISON: 4/10/2023 ultrasound, 2/2/2022 CT HISTORY: recurrent kidney stones, low dose CT for kidney stone only, assess kidney stone burden; Recurrent kidney stones FINDINGS: CHEST: LUNGS:  Incompletely imaged 5 x 5 mm right middle lobe noncalcified pulmonary nodule (series 2, image 1), unchanged since 2018 and statistically benign. MEDIASTINUM: Mild ventriculomegaly. No pericardial effusion. ABDOMEN/PELVIS: LIVER: Cirrhotic morphology. STOMACH: Decompressed which limits evaluation BILIARY:  No biliary ductal dilation. Large gallstones measuring up to 3 cm. PANCREAS:  Subtle fat stranding along the pancreatic head and superior mesenteric vasculature (series 3, image 174 for example). Punctate calcifications in the pancreatic head. SPLEEN:  16 cm splenomegaly. ADRENAL GLANDS: Normal. : Obstructing left middle and distal ureteral calculi, high density measuring 1.0 cm superiorly (series 5, image 53 and series 3, image 344) and 1.7 x 0.8 cm inferiorly (series 5, image 53 and series 3, image 376). Multiple additional smaller calculi. Mild periureteral fat stranding and thickening of the mid to distal left ureter. Associated moderate left hydroureteronephrosis. Bilateral calyceal stones measuring up to 0.9 cm on the right (series 3, image 179) and 0.8 cm on the left (series 3, image 195). No right hydronephrosis. Mild bilateral renal atrophy. No urinary bladder wall thickening. BOWEL/PERITONEUM: Normal caliber of the small and large bowel. Normal appendix. No pneumoperitoneum. No free fluid. RETROPERITONEUM: Normal. VESSELS:   Limited evaluation on non-contrast exam. TIPS LYMPH NODES:  No lymphadenopathy. BONES/SOFT TISSUES: No acute osseous abnormality.   IMPRESSION: 1.  Multiple obstructing left mid and distal ureteral calculi measuring up to 1.7 cm with associated moderate left hydroureteronephrosis. 2.  Inflammatory changes of the left ureter are likely reactive. Correlate with urinalysis to exclude infection. 3.  Bilateral nonobstructing calyceal stone. 4.  Stranding of the mesenteric root along the pancreatic head; suspect due to early volume overload/congestion in the setting of cirrhosis. However, correlate with patient history and/or lipase to exclude pancreatitis. 5.  Cirrhosis and sequelae of portal hypertension. Recommend screening for hepatocellular carcinoma per clinical guidelines. 6.  Gallstones measuring up to 3 cm. Consider outpatient follow-up with general surgery. I have personally reviewed the examination and initial interpretation and I agree with the findings. ROSEY WETZEL DO   SYSTEM ID:  [id number]

## 2023-04-19 NOTE — LETTER
4/19/2023         RE: Ivan Bell  [address] [facility] [id number]        Dear Colleague,    Thank you for referring your patient, Ivan Bell, to the [facility]. Please see a copy of my visit note below.      Assessment:     Diagnoses and all orders for this visit:  Chronic bilateral low back pain with bilateral sciatica  -     PAIN Transforaminal RIYA Inj Lumbosacral Tyron; Future  Lumbar radiculitis  -     PAIN Transforaminal RIYA Inj Lumbosacral Tyron; Future  Lumbar foraminal stenosis  DDD (degenerative disc disease), lumbar     Ivan Bell is a 57 year old y.o. male with past medical history significant for hypertriglyceridemia, hypertension, gouty arthropathy, nephrolithiasis, chronic kidney disease stage II, thrombocytopenia, alcoholic cirrhosis of the liver with ascites, cystoscopy with lithotripsy and bilateral ureteral stent placement 7/1/2022 with stent removal 7/27/2022, right total knee replacement who presents today for follow-up regarding:    -Chronic bilateral low back pain with lumbar radiculitis bilaterally.  Disc bulge at multiple levels, greatest at L5-S1 where there has been progression since previous imaging.     Plan:     A shared decision making plan was used. The patient's values and choices were respected. Prior medical records were reviewed today. The following represents what was discussed and decided upon by the provider and the patient.        -DIAGNOSTIC TESTS: Images were personally reviewed and interpreted.   -- Lumbar spine flexion-extension x-ray 4/17/2023 with mild retrolisthesis L5-S1, no further shifting with flexion extension.  --Lumbar spine MRI 4/17/2023 with RIGHT disc protrusion at L5-S1 with right S1 compression, moderate right facet arthropathy with moderate right foraminal stenosis.  L4-5 with mild bilateral foraminal stenosis.  L3-4 with mild to moderate right and mild left foraminal stenosis with mild central stenosis.   L2-3 mild bilateral foraminal stenosis.  --Lumbar spine MRI 2021 with L4-5 moderate disc height loss with bone spurring with left disc protrusion with left L5 nerve root compression, mild bilateral foraminal stenosis.  L5-S1 moderate disc height loss with facet arthropathy with moderate bilateral foraminal stenosis.    *Referral placed to [facility] neurosurgery per patient request for surgical opinion on 4/11/2023.    -INTERVENTIONS: Ordered bilateral L5-S1 transforaminal epidural steroid injection to see if we get further relief of chronic bilateral low back pain and radicular pain.  Previous failed response with medial branch block and failed response with bilateral L4-5 TFESI.  -We did discuss that if he does not get relief with this injection I would recommend MedX program and we could always consider surgical evaluation for either surgical intervention options versus spinal cord stimulator options down the road.    -MEDICATIONS: No changes to medications today  Discussed side effects of medications and proper use. Patient verbalized understanding.    -PHYSICAL THERAPY: Advised patient continue with home exercises from prior physical therapy sessions.  Again we did discuss revisiting the MedX program which I do recommend for more intensive core strengthening, patient wants to trial the injection first.  Discussed the importance of core strengthening, ROM, stretching exercises with the patient and how each of these entities is important in decreasing pain.  Explained to the patient that the purpose of physical therapy is to teach the patient a home exercise program.  These exercises need to be performed every day in order to decrease pain and prevent future occurrences of pain.        -PATIENT EDUCATION:  Total time of 32 minutes, on the day of service, spent with the patient, reviewing the chart, placing orders, and documenting.   -Today we also discussed the issues related to the current COVID-19  pandemic, the pros and cons of the current treatment plan, the CDC guidelines such as social distancing, washing the hands, and covering the cough.    -FOLLOW UP: Follow-up injection then 2 weeks postinjection  Advised to contact clinic if symptoms worsen or change.    Subjective:     Ivan Bell is a 57 year old male who presents today for follow-up regarding ongoing bilateral low back pain that is fairly equal on both sides that radiates into the buttock and posterior and lateral thighs with numbness and tingling.  Currently pain is a 7/10, 9 at its worst, 4 at its best.  Aggravated with generalized activity bending and walking, does improve with resting.  Patient denies any lower extremity weakness.  Denies recent trips or falls or balance changes.  Denies bowel or bladder loss control.    -Treatment to Date: No prior spinal surgery   Physical therapy 2021 LBP  Physical therapy lumbar MedX program eval only January 2023     History of lumbar epidural steroid injection [facility] 2021 with benefit  Right L4-5 and L5-S1 TFESI 8/24/2022 with 7 to 8% relief for 2 weeks only.  Bilateral L3, L4, L5 MBB 50% relief at best.  Bilateral L4-5 TFESI 11/2/2022 with short-term relief for 2 weeks only, no lasting benefit.  Preprocedure pain 7/10, post 2/10.     -Medications:  Acetaminophen    Patient Active Problem List   Diagnosis     Hypertriglyceridemia     Gouty arthropathy     Erectile dysfunction     CARDIOVASCULAR SCREENING; LDL GOAL LESS THAN 130     Hypertension goal BP (blood pressure) < 140/90     Alcoholic cirrhosis of liver with ascites (H)     Nephrolithiasis     CKD (chronic kidney disease) stage 2, GFR 60-89 ml/min     Seasonal allergic rhinitis, unspecified trigger     Thrombocytopenia (H)     Cervicalgia     Alcohol use disorder, moderate, in sustained remission (H)       Current Outpatient Medications   Medication     acetaminophen (TYLENOL) 500 MG tablet     Ascorbic Acid (VITAMIN C PO)     eplerenone  (INSPRA) 50 MG tablet     fexofenadine (ALLEGRA) 60 MG tablet     furosemide (LASIX) 20 MG tablet     furosemide (LASIX) 40 MG tablet     hydrOXYzine (VISTARIL) 50 MG capsule     lactulose (CHRONULAC) 10 GM/15ML solution     Menaquinone-7 (VITAMIN K2) 100 MCG CAPS     MULTIPLE VITAMINS PO     potassium chloride ER (KLOR-CON M) 20 MEQ CR tablet     rifaximin (XIFAXAN) 550 MG TABS tablet     sertraline (ZOLOFT) 25 MG tablet     sildenafil (REVATIO) 20 MG tablet     sodium bicarbonate 650 MG tablet     tamsulosin (FLOMAX) 0.4 MG capsule     traZODone (DESYREL) 50 MG tablet     vitamin D3 (CHOLECALCIFEROL) 2000 units (50 mcg) tablet     No current facility-administered medications for this visit.       Allergies   Allergen Reactions     Prednisone Visual Disturbance     Trazodone Visual Disturbance     Benadryl [Diphenhydramine] Other (See Comments)     Delirium (visual and auditory hallucinations)     Oxycodone Other (See Comments)     Delirium and constipation  Delirium and constipation       Past Medical History:   Diagnosis Date     Alcoholic cirrhosis of liver (H)      Alcoholic hepatitis 03/2019     Chronic kidney disease     CRF     Depression      Gout      History of transfusion      Hypertension      Hypertriglyceridemia      Left calcaneus fracture 01/09/2006 January 16, 2006: Fell 10 feet from ladder onto left foot on frozen ground on 1/9/06 at home.  Immediate pain and unable to walk- seen at [place] and diagnosed with calcaneus fracture     Nephrolithiasis      Osteoarthritis      Portal vein thrombosis     left occlusion, partial main     Thrombocytopenia (H)         Review of Systems  ROS:  Specifically negative for bowel/bladder dysfunction, balance changes, headache, dizziness, foot drop, fevers, chills, appetite changes, nausea/vomiting, unexplained weight loss. Otherwise 13 systems reviewed are negative. Please see the patient's intake questionnaire from today for details.    Reviewed Social,  Family, Past Medical and Past Surgical history with patient, no significant changes noted since prior visit.     Objective:     BP (!) 150/72 (BP Location: Right arm, Patient Position: Sitting)     PHYSICAL EXAMINATION:    --CONSTITUTIONAL: Well developed, well nourished, healthy appearing individual.  --PSYCHIATRIC: Appropriate mood and affect. No difficulty interacting due to temper, social withdrawal, or memory issues.  --SKIN: Lumbar region is dry and intact.   --RESPIRATORY: Normal rhythm and effort. No abnormal accessory muscle breathing patterns noted.   --MUSCULOSKELETAL:  Normal lumbar lordosis noted, no lateral shift.  --GROSS MOTOR: Easily arises from a seated position. Gait is non-antalgic  --LUMBAR SPINE:  Inspection reveals no evidence of deformity.       RESULTS:   Imaging: Spine imaging was reviewed today. The images were shown to the patient and the findings were explained using a spine model.    XR Lumbar Spine G/E 4 Views  Result Date: 4/18/2023  LUMBAR SPINE FOUR OR MORE VIEWS April 17, 2023 1:35 PM HISTORY: Evaluate spondylolisthesis stability. Chronic bilateral low back pain with bilateral sciatica. Lumbar radiculitis. Lumbar foraminal stenosis. DDD (degenerative disc disease), lumbar. Lumbar facet arthropathy. COMPARISON: Lumbar spine MR 4/17/2023.   IMPRESSION: Five lumbar vertebrae. Minimal degenerative retrolisthesis of L5 upon S1 again noted. Alignment otherwise normal. Alignment has not changed appreciably in the flexed or extended positions. Vertebral body heights normal. No fractures. Facet arthropathy throughout the lumbar spine. Loss of disc space height and degenerative endplate spurring at L1-L2, L2-L3 and L3-L4. SIL OCHOA MD   SYSTEM ID:  [id number]    MR Lumbar Spine w/o Contrast  Result Date: 4/18/2023  MRI LUMBAR SPINE WITHOUT CONTRAST April 17, 2023 1:18 PM HISTORY: Chronic bilateral low back pain with bilateral sciatica. Lumbar radiculitis. Lumbar foraminal stenosis. DDD  (degenerative disc disease), lumbar. Lumbar facet arthropathy. TECHNIQUE: Multiplanar multisequence MRI of the lumbar spine without contrast. COMPARISON: MRI 6/2/2021. FINDINGS: Alignment is significant for multilevel subtle grade 1 spondylolisthesis including grade 1 retrolisthesis of L2 on L3, L3 on L4, L4 on L5, and L5 on S1. Bone marrow demonstrates mild low lumbar spine degenerative endplate changes with marrow edema (Modic I) most conspicuous surrounding the L4-L5 and L5-S1 disc joint with probable subtle associated Schmorl's nodes. Conus medullaris is unremarkable terminating at the level of L1-L2 disc. Cauda equina is unremarkable. Left-sided hydronephrosis with hydroureter. Presumed multiple left-sided renal cysts. Segmental Analysis (evolved compared to the prior as detailed): T12-L1: Disc height maintained. Left central extrusion. Moderate bilateral facet arthropathy. No foraminal stenosis. Mild spinal canal stenosis related to accommodation of facet arthropathy in the left central extrusion. Findings are similar compared to the prior. L1-L2: Disc height maintained. No herniation. Mild bilateral facet arthropathy. No foraminal or spinal canal stenosis.  L2-L3: Mild disc height loss. Disc bulge with shallow right central component and broad right lateral protrusion component. Mild bilateral facet arthropathy. Mild bilateral foraminal stenosis. No spinal canal stenosis. L3-L4: Mild disc height loss. Disc bulge with bilateral foraminal components, right larger than left. Mild bilateral facet arthropathy. Mild to moderate right foraminal stenosis. Mild left foraminal stenosis. Mild spinal canal stenosis. L4-L5: Mild disc height loss. Disc bulge with bilateral foraminal components. Previously demonstrated left central protrusion has decreased in size. Mild bilateral facet arthropathy. Mild bilateral foraminal stenoses. Mild spinal canal stenosis with bilateral lateral recess narrowing.  L5-S1: Mild disc height  loss. Right central disc protrusion which impinges on the traversing right S1 nerve root within the lateral recess (series 5 image 7), worsened compared to the prior. Moderate right and mild left facet arthropathy. Moderate right foraminal stenosis. Mild left foraminal stenosis. Mild spinal canal stenosis.   IMPRESSION: Multiple disc herniations and stenoses as detailed. HAYDE DAMON MD   SYSTEM ID:  [id number]    CT Abdomen Pelvis w/o Contrast  Result Date: 4/12/2023  EXAMINATION: CT ABDOMEN PELVIS W/O CONTRAST, 4/12/2023 6:58 AM TECHNIQUE:  Helical CT images from the lung bases through the symphysis pubis were obtained without IV contrast. COMPARISON: 4/10/2023 ultrasound, 2/2/2022 CT HISTORY: recurrent kidney stones, low dose CT for kidney stone only, assess kidney stone burden; Recurrent kidney stones FINDINGS: CHEST: LUNGS:  Incompletely imaged 5 x 5 mm right middle lobe noncalcified pulmonary nodule (series 2, image 1), unchanged since 2018 and statistically benign. MEDIASTINUM: Mild ventriculomegaly. No pericardial effusion. ABDOMEN/PELVIS: LIVER: Cirrhotic morphology. STOMACH: Decompressed which limits evaluation BILIARY:  No biliary ductal dilation. Large gallstones measuring up to 3 cm. PANCREAS:  Subtle fat stranding along the pancreatic head and superior mesenteric vasculature (series 3, image 174 for example). Punctate calcifications in the pancreatic head. SPLEEN:  16 cm splenomegaly. ADRENAL GLANDS: Normal. : Obstructing left middle and distal ureteral calculi, high density measuring 1.0 cm superiorly (series 5, image 53 and series 3, image 344) and 1.7 x 0.8 cm inferiorly (series 5, image 53 and series 3, image 376). Multiple additional smaller calculi. Mild periureteral fat stranding and thickening of the mid to distal left ureter. Associated moderate left hydroureteronephrosis. Bilateral calyceal stones measuring up to 0.9 cm on the right (series 3, image 179) and 0.8 cm on the left (series 3,  image 195). No right hydronephrosis. Mild bilateral renal atrophy. No urinary bladder wall thickening. BOWEL/PERITONEUM: Normal caliber of the small and large bowel. Normal appendix. No pneumoperitoneum. No free fluid. RETROPERITONEUM: Normal. VESSELS:  Limited evaluation on non-contrast exam. TIPS LYMPH NODES:  No lymphadenopathy. BONES/SOFT TISSUES: No acute osseous abnormality.   IMPRESSION: 1.  Multiple obstructing left mid and distal ureteral calculi measuring up to 1.7 cm with associated moderate left hydroureteronephrosis. 2.  Inflammatory changes of the left ureter are likely reactive. Correlate with urinalysis to exclude infection. 3.  Bilateral nonobstructing calyceal stone. 4.  Stranding of the mesenteric root along the pancreatic head; suspect due to early volume overload/congestion in the setting of cirrhosis. However, correlate with patient history and/or lipase to exclude pancreatitis. 5.  Cirrhosis and sequelae of portal hypertension. Recommend screening for hepatocellular carcinoma per clinical guidelines. 6.  Gallstones measuring up to 3 cm. Consider outpatient follow-up with general surgery. I have personally reviewed the examination and initial interpretation and I agree with the findings. ROSEY WETZEL,    SYSTEM ID:  [id number]                          Again, thank you for allowing me to participate in the care of your patient.        Sincerely,        Nina Montoya, CNP

## 2023-04-20 ENCOUNTER — TELEPHONE (OUTPATIENT)
Dept: FAMILY MEDICINE | Facility: CLINIC | Age: 58
End: 2023-04-20
Payer: COMMERCIAL

## 2023-04-20 NOTE — TELEPHONE ENCOUNTER
Please clarify why patient is requesting to be seen.  Is he requesting a preop?  If yes, may schedule tomorrow at 11 am (may override admin block).  Please give correct arrival time.

## 2023-04-20 NOTE — TELEPHONE ENCOUNTER
Reason for Call:  Other     Detailed comments: Patient is scheduled for gallbladder surgery 04/24/2023 and there is no UC openings would provider be willing to see patient.    Phone Number Patient can be reached at: Home number on file [phone] (home)    Best Time:     Can we leave a detailed message on this number? YES    Call taken on 4/20/2023 at 11:21 AM by Ava Wiseman

## 2023-04-21 ENCOUNTER — OFFICE VISIT (OUTPATIENT)
Dept: FAMILY MEDICINE | Facility: CLINIC | Age: 58
End: 2023-04-21
Payer: COMMERCIAL

## 2023-04-21 VITALS
DIASTOLIC BLOOD PRESSURE: 70 MMHG | TEMPERATURE: 98.7 F | SYSTOLIC BLOOD PRESSURE: 132 MMHG | HEIGHT: 71 IN | OXYGEN SATURATION: 98 % | WEIGHT: 194.6 LBS | RESPIRATION RATE: 18 BRPM | BODY MASS INDEX: 27.24 KG/M2 | HEART RATE: 77 BPM

## 2023-04-21 DIAGNOSIS — N20.0 NEPHROLITHIASIS: ICD-10-CM

## 2023-04-21 DIAGNOSIS — Z01.818 PREOP GENERAL PHYSICAL EXAM: Primary | ICD-10-CM

## 2023-04-21 PROCEDURE — 99213 OFFICE O/P EST LOW 20 MIN: CPT | Mod: 25 | Performed by: FAMILY MEDICINE

## 2023-04-21 PROCEDURE — 90472 IMMUNIZATION ADMIN EACH ADD: CPT | Performed by: FAMILY MEDICINE

## 2023-04-21 PROCEDURE — 90677 PCV20 VACCINE IM: CPT | Performed by: FAMILY MEDICINE

## 2023-04-21 PROCEDURE — 90471 IMMUNIZATION ADMIN: CPT | Performed by: FAMILY MEDICINE

## 2023-04-21 PROCEDURE — 90750 HZV VACC RECOMBINANT IM: CPT | Performed by: FAMILY MEDICINE

## 2023-04-21 ASSESSMENT — ENCOUNTER SYMPTOMS
JOINT SWELLING: 0
CHILLS: 0
DIZZINESS: 0
COUGH: 0
PALPITATIONS: 0
WEAKNESS: 0
SHORTNESS OF BREATH: 0
PARESTHESIAS: 0
ARTHRALGIAS: 0
SORE THROAT: 0
MYALGIAS: 0
FEVER: 0
HEADACHES: 0
NERVOUS/ANXIOUS: 0

## 2023-04-21 NOTE — PATIENT INSTRUCTIONS
Damian Love,    Thank you for allowing [facility] to manage your care.    If you have any questions or concerns, please feel free to call us at [phone].    Sincerely,    Dr. Washington    Did you know?      You can schedule a video visit for follow-up appointments as well as future appointments for certain conditions.  Please see the below link.     [email or web address]    If you have not already done so,  I encourage you to sign up for [employer] [email or web address]).  This will allow you to review your results, securely communicate with a provider, and schedule virtual visits as well.        For informational purposes only. Not to replace the advice of your health care provider. Copyright   2003, 2019 [facility]. All rights reserved. Clinically reviewed by Na Orantes MD. [employer] [id number] - REV 12/22.  Preparing for Your Surgery  Getting started  A nurse will call you to review your health history and instructions. They will give you an arrival time based on your scheduled surgery time. Please be ready to share:  Your doctor's clinic name and phone number  Your medical, surgical, and anesthesia history  A list of allergies and sensitivities  A list of medicines, including herbal treatments and over-the-counter drugs  Whether the patient has a legal guardian (ask how to send us the papers in advance)  Please tell us if you're pregnant--or if there's any chance you might be pregnant. Some surgeries may injure a fetus (unborn baby), so they require a pregnancy test. Surgeries that are safe for a fetus don't always need a test, and you can choose whether to have one.   If you have a child who's having surgery, please ask for a copy of Preparing for Your Child's Surgery.    Preparing for surgery  Within 10 to 30 days of surgery: Have a pre-op exam (sometimes called an H&P, or History and Physical). This can be done at a clinic or pre-operative  center.  If you're having a , you may not need this exam. Talk to your care team.  At your pre-op exam, talk to your care team about all medicines you take. If you need to stop any medicines before surgery, ask when to start taking them again.  We do this for your safety. Many medicines can make you bleed too much during surgery. Some change how well surgery (anesthesia) drugs work.  Call your insurance company to let them know you're having surgery. (If you don't have insurance, call [phone].)  Call your clinic if there's any change in your health. This includes signs of a cold or flu (sore throat, runny nose, cough, rash, fever). It also includes a scrape or scratch near the surgery site.  If you have questions on the day of surgery, call your hospital or surgery center.  Eating and drinking guidelines  For your safety: Unless your surgeon tells you otherwise, follow the guidelines below.  Eat and drink as usual until 8 hours before you arrive for surgery. After that, no food or milk.  Drink clear liquids until 2 hours before you arrive. These are liquids you can see through, like water, Gatorade, and Propel Water. They also include plain black coffee and tea (no cream or milk), candy, and breath mints. You can spit out gum when you arrive.  If you drink alcohol: Stop drinking it the night before surgery.  If your care team tells you to take medicine on the morning of surgery, it's okay to take it with a sip of water.  Preventing infection  Shower or bathe the night before and morning of your surgery. Follow the instructions your clinic gave you. (If no instructions, use regular soap.)  Don't shave or clip hair near your surgery site. We'll remove the hair if needed.  Don't smoke or vape the morning of surgery. You may chew nicotine gum up to 2 hours before surgery. A nicotine patch is okay.  Note: Some surgeries require you to completely quit smoking and nicotine. Check with your surgeon.  Your care  team will make every effort to keep you safe from infection. We will:  Clean our hands often with soap and water (or an alcohol-based hand rub).  Clean the skin at your surgery site with a special soap that kills germs.  Give you a special gown to keep you warm. (Cold raises the risk of infection.)  Wear special hair covers, masks, gowns and gloves during surgery.  Give antibiotic medicine, if prescribed. Not all surgeries need antibiotics.  What to bring on the day of surgery  Photo ID and insurance card  Copy of your health care directive, if you have one  Glasses and hearing aids (bring cases)  You can't wear contacts during surgery  Inhaler and eye drops, if you use them (tell us about these when you arrive)  CPAP machine or breathing device, if you use them  A few personal items, if spending the night  If you have . . .  A pacemaker, ICD (cardiac defibrillator) or other implant: Bring the ID card.  An implanted stimulator: Bring the remote control.  A legal guardian: Bring a copy of the certified (court-stamped) guardianship papers.  Please remove any jewelry, including body piercings. Leave jewelry and other valuables at home.  If you're going home the day of surgery  You must have a responsible adult drive you home. They should stay with you overnight as well.  If you don't have someone to stay with you, and you aren't safe to go home alone, we may keep you overnight. Insurance often won't pay for this.  After surgery  If it's hard to control your pain or you need more pain medicine, please call your surgeon's office.  Questions?   If you have any questions for your care team, list them here: _________________________________________________________________________________________________________________________________________________________________________ ____________________________________ ____________________________________ ____________________________________    How to Take Your Medication Before  Surgery  - HOLD (do not take) your LASIX (FUROSEMIDE) on the morning of surgery.

## 2023-04-21 NOTE — H&P (VIEW-ONLY)
[facility]  [address] [facility] [facility]  [place] [address]  Phone: [phone]  Primary Provider: Too Washington  Pre-op Performing Provider: TOO WASHINGTON      PREOPERATIVE EVALUATION:  Today's date: 4/21/2023    Ivan Bell is a 57 year old male who presents for a preoperative evaluation.       View : No data to display.              Surgical Information:  Surgery/Procedure: CYSTOSCOPY, LEFT RETROGRADE PYELOGRAM, LEFT URETEROSCOPY WITH LASER LITHOTRIOPSY AND BASKET REMOVAL OF STONES, LEFT URETERAL STENT PLACEMENT  Surgery Location: [facility]  Surgeon: Dylan Galaviz MD  Surgery Date: 04/24/2023  Time of Surgery: 1:30pm  Where patient plans to recover: At home with family  Fax number for surgical facility: N/A    Assessment & Plan     The proposed surgical procedure is considered INTERMEDIATE risk.    Preop general physical exam    Nephrolithiasis    Possible Sleep Apnea:        4/21/2023    11:04 AM   STOP-Bang Total Score   Total Score 4          Risks and Recommendations:  The patient has the following additional risks and recommendations for perioperative complications:   - No identified additional risk factors other than previously addressed    Medication Instructions:   - Diuretics: HOLD on the day of surgery.    RECOMMENDATION:  APPROVAL GIVEN to proceed with proposed procedure, without further diagnostic evaluation.   [id number]}    Subjective     HPI related to upcoming procedure: 56 yo M with 1.7 left sided kidney stone.           4/21/2023    10:41 AM   Preop Questions   1. Have you ever had a heart attack or stroke? No   2. Have you ever had surgery on your heart or blood vessels, such as a stent placement, a coronary artery bypass, or surgery on an artery in your head, neck, heart, or legs? No   3. Do you have chest pain with activity? No   4. Do you have a history of  heart failure? No   5. Do you currently have a cold, bronchitis or symptoms of other  infection? No   6. Do you have a cough, shortness of breath, or wheezing? No   7. Do you or anyone in your family have previous history of blood clots? No   8. Do you or does anyone in your family have a serious bleeding problem such as prolonged bleeding following surgeries or cuts? No   9. Have you ever had problems with anemia or been told to take iron pills? No   10. Have you had any abnormal blood loss such as black, tarry or bloody stools? No   11. Have you ever had a blood transfusion? No   12. Are you willing to have a blood transfusion if it is medically needed before, during, or after your surgery? Yes   13. Have you or any of your relatives ever had problems with anesthesia? No   14. Do you have sleep apnea, excessive snoring or daytime drowsiness? YES - History of insomnia.   14a. Do you have a CPAP machine? No   15. Do you have any artifical heart valves or other implanted medical devices like a pacemaker, defibrillator, or continuous glucose monitor? No   16. Do you have artificial joints? YES - Rt knee, lt heel    17. Are you allergic to latex? No     Health Care Directive:  Patient does not have a Health Care Directive or Living Will: Discussed advance care planning with patient; information given to patient to review.    Preoperative Review of :   reviewed - no record of controlled substances prescribed.      Status of Chronic Conditions:  History of alcohol abuse - sober for the past 7 years.     Review of Systems   Constitutional: Negative for chills and fever.   HENT: Negative for congestion, ear pain, hearing loss and sore throat.    Respiratory: Negative for cough and shortness of breath.    Cardiovascular: Negative for chest pain, palpitations and peripheral edema.   Musculoskeletal: Negative for arthralgias, joint swelling and myalgias.   Skin: Negative for rash.   Neurological: Negative for dizziness, weakness, headaches and paresthesias.   Psychiatric/Behavioral: Negative for mood  changes. The patient is not nervous/anxious.          Patient Active Problem List    Diagnosis Date Noted     Alcohol use disorder, moderate, in sustained remission (H) 03/27/2023     Priority: Medium     Cervicalgia 04/13/2021     Priority: Medium     Thrombocytopenia (H) 06/05/2020     Priority: Medium     CKD (chronic kidney disease) stage 2, GFR 60-89 ml/min 04/17/2020     Priority: Medium     Seasonal allergic rhinitis, unspecified trigger 04/17/2020     Priority: Medium     Nephrolithiasis 09/23/2018     Priority: Medium     September 23, 2018 non-obstructing, incident finding on us.        Alcoholic cirrhosis of liver with ascites (H) 03/08/2016     Priority: Medium     September 23, 2018 now sober, ascites resolved, S/P TIP procedure 6/2018. Normal liver enzymes, diminished liver function.  INR 1.6, bilirubin 2.5, albumin 2.6. He  appears healthy. Doing well. Stressed the importance of abstaining from alcohol. See copy of recent us.     9/10/18:  Impression:   1. Patent TIPS with appropriate in stent velocities. Normal Doppler  evaluation of the remainder of the hepatic vasculature in the setting  of TIPS.  2. Cirrhotic hepatic morphology with evidence of portal hypertension,  including splenomegaly. No focal hepatic mass.  3. Cholelithiasis without evidence of cholecystitis.  4. Bilateral nonobstructing nephrolithiasis.            Hypertension goal BP (blood pressure) < 140/90 12/18/2012     Priority: Medium     CARDIOVASCULAR SCREENING; LDL GOAL LESS THAN 130 10/31/2010     Priority: Medium     Erectile dysfunction 01/29/2008     Priority: Medium     September 23, 2018 no known CAD, no contraindications to sildenafil.        Gouty arthropathy 12/31/2006     Priority: Medium     Problem list name updated by automated process. Provider to review and confirm  [employer] Update utility       Hypertriglyceridemia 08/03/2005     Priority: Medium     Last Exam: December 19, 2007  Last Lipids: CHOL      211   01/25/2007  LDL      104   01/25/2007 HDL       83   01/25/2007  Last LFTs:: AST      106   01/25/2007   ALT       53   01/25/2007    TRIG      120   01/25/2007  TRIG      115   01/16/2006  Meds: Lopid 600 bid - tolerating        Past Medical History:   Diagnosis Date     Alcoholic cirrhosis of liver (H)      Alcoholic hepatitis 03/2019     Chronic kidney disease     CRF     Depression      Gout      History of transfusion      Hypertension      Hypertriglyceridemia      Left calcaneus fracture 01/09/2006 January 16, 2006: Fell 10 feet from ladder onto left foot on frozen ground on 1/9/06 at home.  Immediate pain and unable to walk- seen at [place] and diagnosed with calcaneus fracture     Nephrolithiasis      Osteoarthritis      Portal vein thrombosis     left occlusion, partial main     Thrombocytopenia (H)      Past Surgical History:   Procedure Laterality Date     ANKLE SURGERY Left      ANKLE SURGERY       ARTHROSCOPY KNEE       COLONOSCOPY N/A 03/31/2016    Procedure: COLONOSCOPY;  Surgeon: Rhys Uriostegui MD;  Location: [facility]     COMBINED CYSTOSCOPY, RETROGRADES, URETEROSCOPY, LASER HOLMIUM LITHOTRIPSY URETER(S), INSERT STENT Bilateral 7/1/2022    Procedure: CYSTOSCOPY, RIGHT RETROGRADE PYELOGRAM, RIGHT URETEROSCOPY WITH LASER LITHOTRIPSY AND BASKET REMOVAL OF STONE, RIGHT URETERAL STENT PLACEMENT, LEFT RETROGRADE PYELOGRAM, LEFT URETEROSCOPY WITH LASER LITHOTRIPSY AND BASKET REMOVAL OF STONE, LEFT URETERAL STENT PLACEMENT;  Surgeon: Dylan Galaviz MD;  Location:  OR     COMBINED CYSTOSCOPY, RETROGRADES, URETEROSCOPY, LASER HOLMIUM LITHOTRIPSY URETER(S), INSERT STENT Bilateral 7/27/2022    Procedure: CYSTOSCOPY, BILATERAL URETERAL STENT REMOVAL, BILATERAL  RETROGRADE PYELOGRAM, BILATERAL URETEROSCOPY. HOLMIUM LASER LITHOTRIPSY LEFT SIDE.  AND BASKET REMOVAL OF STONES BILATERAL, LEFT  URETERAL STENT PLACEMENT;  Surgeon: Dylan Galaviz MD;  Location:  OR     ESOPHAGOSCOPY, GASTROSCOPY, DUODENOSCOPY  (EGD), COMBINED N/A 03/31/2016    Procedure: COMBINED ESOPHAGOSCOPY, GASTROSCOPY, DUODENOSCOPY (EGD);  Surgeon: Rhys Uriostegui MD;  Location:  GI     ESOPHAGOSCOPY, GASTROSCOPY, DUODENOSCOPY (EGD), COMBINED N/A 03/09/2018    Procedure: COMBINED ESOPHAGOSCOPY, GASTROSCOPY, DUODENOSCOPY (EGD), BIOPSY SINGLE OR MULTIPLE;  EGD;  Surgeon: Gonzalo Wahl MD;  Location:  GI     ESOPHAGOSCOPY, GASTROSCOPY, DUODENOSCOPY (EGD), COMBINED N/A 06/07/2019    Procedure: ESOPHAGOGASTRODUODENOSCOPY (EGD);  Surgeon: Gonzalo Wahl MD;  Location:  GI     FOOT SURGERY       IR PARACENTESIS  10/29/2019     IR PARACENTESIS  11/25/2020     IR PARACENTESIS  08/11/2021     IR PARACENTESIS  01/28/2022     IR PARACENTESIS  03/30/2022     IR TRANSVEN INTRAHEPATIC PORTOSYST REV  10/29/2019     IR TRANSVEN INTRAHEPATIC PORTOSYST REV  11/25/2020     IR TRANSVEN INTRAHEPATIC PORTOSYST REV  08/11/2021     IR TRANSVEN INTRAHEPATIC PORTOSYST REV  01/28/2022     IR TRANSVEN INTRAHEPATIC PORTOSYST REV  03/30/2022     KNEE SURGERY Left      KNEE SURGERY Right      RELEASE CARPAL TUNNEL       RELEASE TRIGGER FINGER Right 06/11/2020    Procedure: RELEASE, TRIGGER FINGER, right ring and long finger;  Surgeon: Pascual Valencia MD;  Location: [facility]     SIGMOIDOSCOPY FLEXIBLE N/A 10/31/2017    Procedure: SIGMOIDOSCOPY FLEXIBLE;;  Surgeon: Armaan Adams MD;  Location:  GI     TIPS Procedure  06/06/2018     TIPS PROCEDURE  11/01/2020     ZZC PLASTY KNEE,MED OR LAT COMPARTMT Right 02/19/2021    Procedure: RIGHT UNICOMPARTMENTAL ARTHROPLASTY KNEE MEDIAL;  Surgeon: José Miguel Landaverde MD;  Location: [facility];  Service: Orthopedics     Current Outpatient Medications   Medication Sig Dispense Refill     acetaminophen (TYLENOL) 500 MG tablet Take 1,000 mg by mouth every 6 hours as needed for mild pain        Ascorbic Acid (VITAMIN C PO) Take by mouth daily       eplerenone (INSPRA) 50 MG tablet Take 2 tablets  (100 mg) by mouth 2 times daily (Patient taking differently: Take 100 mg by mouth 2 times daily Patient taking as ordered as of 8/16/22.) 340 tablet 3     fexofenadine (ALLEGRA) 60 MG tablet Take 1 tablet (60 mg) by mouth daily 30 tablet 1     furosemide (LASIX) 20 MG tablet Take 1 tablet (20 mg) by mouth every evening 90 tablet 3     furosemide (LASIX) 40 MG tablet Take 1 tablet (40 mg) by mouth every morning 90 tablet 3     hydrOXYzine (VISTARIL) 50 MG capsule Take 1 capsule (50 mg) by mouth at bedtime as needed, may repeat once (For anxiety and difficulty sleeping) 60 capsule 2     lactulose (CHRONULAC) 10 GM/15ML solution TAKE 30MLS BY MOUTH THREE TIMES DAILY AS NEEDED FOR CONSTIPATION (TAKE AS NEEDED TO MAINTAIN 3 TO 4 BOWEL MOVEMENTS DAILY) 2000 mL 11     Menaquinone-7 (VITAMIN K2) 100 MCG CAPS Take 200 mcg by mouth daily        MULTIPLE VITAMINS PO Take 1 tablet by mouth daily       potassium chloride ER (KLOR-CON M) 20 MEQ CR tablet Take 2 tablets (40 mEq) by mouth daily 120 tablet 2     rifaximin (XIFAXAN) 550 MG TABS tablet Take 1 tablet (550 mg) by mouth 2 times daily 180 tablet 3     sertraline (ZOLOFT) 25 MG tablet Take 1 tablet (25 mg) by mouth daily 90 tablet 3     sildenafil (REVATIO) 20 MG tablet Take 3-5 tabs 1/2-4 hours to relations 30 tablet 1     sodium bicarbonate 650 MG tablet Take 1 tablet (650 mg) by mouth 2 times daily 180 tablet 3     tamsulosin (FLOMAX) 0.4 MG capsule Take 1 capsule (0.4 mg) by mouth daily 30 capsule 0     traZODone (DESYREL) 50 MG tablet Take 1-2 tablets ( mg) by mouth nightly as needed for sleep 60 tablet 2     vitamin D3 (CHOLECALCIFEROL) 2000 units (50 mcg) tablet Take 1 tablet by mouth daily         Allergies   Allergen Reactions     Prednisone Visual Disturbance     Trazodone Visual Disturbance     Benadryl [Diphenhydramine] Other (See Comments)     Delirium (visual and auditory hallucinations)     Oxycodone Other (See Comments)     Delirium and  "constipation  Delirium and constipation        Social History     Tobacco Use     Smoking status: Former     Packs/day: 0.00     Types: Dip, chew, snus or snuff, Cigarettes     Quit date: 1998     Years since quittin.6     Smokeless tobacco: Former     Types: Chew     Tobacco comments:     1 tin per 10 days.   Vaping Use     Vaping status: Never Used   Substance Use Topics     Alcohol use: No     Alcohol/week: 17.5 standard drinks of alcohol     Types: 21 Cans of beer per week     Comment: 2019     Family History   Problem Relation Age of Onset     Family History Negative Mother      Family History Negative Father      Hypertension Father      Cerebrovascular Disease Father 87     Breast Cancer Maternal Grandmother      Substance Abuse Brother      Rheumatoid Arthritis Daughter      Depression Daughter      Cancer - colorectal No family hx of      Prostate Cancer No family hx of      Liver Disease No family hx of      History   Drug Use No         Objective     /70   Pulse 77   Temp 98.7  F (37.1  C) (Temporal)   Resp 18   Ht 1.803 m (5' 11\")   Wt 88.3 kg (194 lb 9.6 oz)   SpO2 98%   BMI 27.14 kg/m      Physical Exam  Constitutional:       General: He is not in acute distress.     Appearance: He is well-developed.   HENT:      Head: Normocephalic and atraumatic.      Nose: Nose normal.   Eyes:      Conjunctiva/sclera: Conjunctivae normal.   Neck:      Trachea: No tracheal deviation.   Cardiovascular:      Rate and Rhythm: Normal rate and regular rhythm.      Heart sounds: Normal heart sounds.   Pulmonary:      Effort: Pulmonary effort is normal.      Breath sounds: No wheezing.   Musculoskeletal:         General: Normal range of motion.      Cervical back: Normal range of motion.   Skin:     Findings: No erythema or rash.   Neurological:      Mental Status: He is alert and oriented to person, place, and time.   Psychiatric:         Behavior: Behavior normal.         Recent Labs   Lab Test " 04/10/23  0656 10/12/22  0712   HGB 11.0* 11.1*   PLT 74* 78*   INR 1.47* 1.63*    143   POTASSIUM 4.2 3.7   CR 1.70* 1.47*      4/12/23    1.  Multiple obstructing left mid and distal ureteral calculi  measuring up to 1.7 cm with associated moderate left  hydroureteronephrosis.  2.  Inflammatory changes of the left ureter are likely reactive.  Correlate with urinalysis to exclude infection.  3.  Bilateral nonobstructing calyceal stone.  4.  Stranding of the mesenteric root along the pancreatic head;  suspect due to early volume overload/congestion in the setting of  cirrhosis. However, correlate with patient history and/or lipase to  exclude pancreatitis.  5.  Cirrhosis and sequelae of portal hypertension. Recommend screening  for hepatocellular carcinoma per clinical guidelines.  6.  Gallstones measuring up to 3 cm. Consider outpatient follow-up  with general surgery    Diagnostics:  No labs were ordered during this visit.   No EKG required, no history of coronary heart disease, significant arrhythmia, peripheral arterial disease or other structural heart disease.        Revised Cardiac Risk Index (RCRI):  The patient has the following serious cardiovascular risks for perioperative complications:   - No serious cardiac risks = 0 points     RCRI Interpretation: 0 points: Class I (very low risk - 0.4% complication rate)           Signed Electronically by: Too Washington DO  Copy of this evaluation report is provided to requesting physician.

## 2023-04-21 NOTE — PROGRESS NOTES
[facility]  [address] [facility]  [place] [address]  Phone: [phone]  Primary Provider: Too Washington  Pre-op Performing Provider: TOO WASHINGTON      PREOPERATIVE EVALUATION:  Today's date: 4/21/2023    Ivan Bell is a 57 year old male who presents for a preoperative evaluation.       View : No data to display.              Surgical Information:  Surgery/Procedure: CYSTOSCOPY, LEFT RETROGRADE PYELOGRAM, LEFT URETEROSCOPY WITH LASER LITHOTRIOPSY AND BASKET REMOVAL OF STONES, LEFT URETERAL STENT PLACEMENT  Surgery Location: [facility]  Surgeon: Dylan Galaviz MD  Surgery Date: 04/24/2023  Time of Surgery: 1:30pm  Where patient plans to recover: At home with family  Fax number for surgical facility: N/A    Assessment & Plan     The proposed surgical procedure is considered INTERMEDIATE risk.    Preop general physical exam    Nephrolithiasis    Possible Sleep Apnea:        4/21/2023    11:04 AM   STOP-Bang Total Score   Total Score 4          Risks and Recommendations:  The patient has the following additional risks and recommendations for perioperative complications:   - No identified additional risk factors other than previously addressed    Medication Instructions:   - Diuretics: HOLD on the day of surgery.    RECOMMENDATION:  APPROVAL GIVEN to proceed with proposed procedure, without further diagnostic evaluation.   [id number]}    Subjective     HPI related to upcoming procedure: 56 yo M with 1.7 left sided kidney stone.           4/21/2023    10:41 AM   Preop Questions   1. Have you ever had a heart attack or stroke? No   2. Have you ever had surgery on your heart or blood vessels, such as a stent placement, a coronary artery bypass, or surgery on an artery in your head, neck, heart, or legs? No   3. Do you have chest pain with activity? No   4. Do you have a history of  heart failure? No   5. Do you currently have a cold, bronchitis or symptoms of other  infection? No   6. Do you have a cough, shortness of breath, or wheezing? No   7. Do you or anyone in your family have previous history of blood clots? No   8. Do you or does anyone in your family have a serious bleeding problem such as prolonged bleeding following surgeries or cuts? No   9. Have you ever had problems with anemia or been told to take iron pills? No   10. Have you had any abnormal blood loss such as black, tarry or bloody stools? No   11. Have you ever had a blood transfusion? No   12. Are you willing to have a blood transfusion if it is medically needed before, during, or after your surgery? Yes   13. Have you or any of your relatives ever had problems with anesthesia? No   14. Do you have sleep apnea, excessive snoring or daytime drowsiness? YES - History of insomnia.   14a. Do you have a CPAP machine? No   15. Do you have any artifical heart valves or other implanted medical devices like a pacemaker, defibrillator, or continuous glucose monitor? No   16. Do you have artificial joints? YES - Rt knee, lt heel    17. Are you allergic to latex? No     Health Care Directive:  Patient does not have a Health Care Directive or Living Will: Discussed advance care planning with patient; information given to patient to review.    Preoperative Review of :   reviewed - no record of controlled substances prescribed.      Status of Chronic Conditions:  History of alcohol abuse - sober for the past 7 years.     Review of Systems   Constitutional: Negative for chills and fever.   HENT: Negative for congestion, ear pain, hearing loss and sore throat.    Respiratory: Negative for cough and shortness of breath.    Cardiovascular: Negative for chest pain, palpitations and peripheral edema.   Musculoskeletal: Negative for arthralgias, joint swelling and myalgias.   Skin: Negative for rash.   Neurological: Negative for dizziness, weakness, headaches and paresthesias.   Psychiatric/Behavioral: Negative for mood  changes. The patient is not nervous/anxious.          Patient Active Problem List    Diagnosis Date Noted     Alcohol use disorder, moderate, in sustained remission (H) 03/27/2023     Priority: Medium     Cervicalgia 04/13/2021     Priority: Medium     Thrombocytopenia (H) 06/05/2020     Priority: Medium     CKD (chronic kidney disease) stage 2, GFR 60-89 ml/min 04/17/2020     Priority: Medium     Seasonal allergic rhinitis, unspecified trigger 04/17/2020     Priority: Medium     Nephrolithiasis 09/23/2018     Priority: Medium     September 23, 2018 non-obstructing, incident finding on us.        Alcoholic cirrhosis of liver with ascites (H) 03/08/2016     Priority: Medium     September 23, 2018 now sober, ascites resolved, S/P TIP procedure 6/2018. Normal liver enzymes, diminished liver function.  INR 1.6, bilirubin 2.5, albumin 2.6. He  appears healthy. Doing well. Stressed the importance of abstaining from alcohol. See copy of recent us.     9/10/18:  Impression:   1. Patent TIPS with appropriate in stent velocities. Normal Doppler  evaluation of the remainder of the hepatic vasculature in the setting  of TIPS.  2. Cirrhotic hepatic morphology with evidence of portal hypertension,  including splenomegaly. No focal hepatic mass.  3. Cholelithiasis without evidence of cholecystitis.  4. Bilateral nonobstructing nephrolithiasis.            Hypertension goal BP (blood pressure) < 140/90 12/18/2012     Priority: Medium     CARDIOVASCULAR SCREENING; LDL GOAL LESS THAN 130 10/31/2010     Priority: Medium     Erectile dysfunction 01/29/2008     Priority: Medium     September 23, 2018 no known CAD, no contraindications to sildenafil.        Gouty arthropathy 12/31/2006     Priority: Medium     Problem list name updated by automated process. Provider to review and confirm  [employer] Update utility       Hypertriglyceridemia 08/03/2005     Priority: Medium     Last Exam: December 19, 2007  Last Lipids: CHOL      211   01/25/2007  LDL      104   01/25/2007 HDL       83   01/25/2007  Last LFTs:: AST      106   01/25/2007   ALT       53   01/25/2007    TRIG      120   01/25/2007  TRIG      115   01/16/2006  Meds: Lopid 600 bid - tolerating        Past Medical History:   Diagnosis Date     Alcoholic cirrhosis of liver (H)      Alcoholic hepatitis 03/2019     Chronic kidney disease     CRF     Depression      Gout      History of transfusion      Hypertension      Hypertriglyceridemia      Left calcaneus fracture 01/09/2006 January 16, 2006: Fell 10 feet from ladder onto left foot on frozen ground on 1/9/06 at home.  Immediate pain and unable to walk- seen at [place] and diagnosed with calcaneus fracture     Nephrolithiasis      Osteoarthritis      Portal vein thrombosis     left occlusion, partial main     Thrombocytopenia (H)      Past Surgical History:   Procedure Laterality Date     ANKLE SURGERY Left      ANKLE SURGERY       ARTHROSCOPY KNEE       COLONOSCOPY N/A 03/31/2016    Procedure: COLONOSCOPY;  Surgeon: Rhys Uriostegui MD;  [facility]: [facility]     COMBINED CYSTOSCOPY, RETROGRADES, URETEROSCOPY, LASER HOLMIUM LITHOTRIPSY URETER(S), INSERT STENT Bilateral 7/1/2022    Procedure: CYSTOSCOPY, RIGHT RETROGRADE PYELOGRAM, RIGHT URETEROSCOPY WITH LASER LITHOTRIPSY AND BASKET REMOVAL OF STONE, RIGHT URETERAL STENT PLACEMENT, LEFT RETROGRADE PYELOGRAM, LEFT URETEROSCOPY WITH LASER LITHOTRIPSY AND BASKET REMOVAL OF STONE, LEFT URETERAL STENT PLACEMENT;  Surgeon: Dylan Galaviz MD;  Location:  OR     COMBINED CYSTOSCOPY, RETROGRADES, URETEROSCOPY, LASER HOLMIUM LITHOTRIPSY URETER(S), INSERT STENT Bilateral 7/27/2022    Procedure: CYSTOSCOPY, BILATERAL URETERAL STENT REMOVAL, BILATERAL  RETROGRADE PYELOGRAM, BILATERAL URETEROSCOPY. HOLMIUM LASER LITHOTRIPSY LEFT SIDE.  AND BASKET REMOVAL OF STONES BILATERAL, LEFT  URETERAL STENT PLACEMENT;  Surgeon: Dylan Galaviz MD;  Location:  OR     ESOPHAGOSCOPY, GASTROSCOPY, DUODENOSCOPY  (EGD), COMBINED N/A 03/31/2016    Procedure: COMBINED ESOPHAGOSCOPY, GASTROSCOPY, DUODENOSCOPY (EGD);  Surgeon: Rhys Uriostegui MD;  Location:  GI     ESOPHAGOSCOPY, GASTROSCOPY, DUODENOSCOPY (EGD), COMBINED N/A 03/09/2018    Procedure: COMBINED ESOPHAGOSCOPY, GASTROSCOPY, DUODENOSCOPY (EGD), BIOPSY SINGLE OR MULTIPLE;  EGD;  Surgeon: Gonzalo Wahl MD;  Location:  GI     ESOPHAGOSCOPY, GASTROSCOPY, DUODENOSCOPY (EGD), COMBINED N/A 06/07/2019    Procedure: ESOPHAGOGASTRODUODENOSCOPY (EGD);  Surgeon: Gonzalo Wahl MD;  Location:  GI     FOOT SURGERY       IR PARACENTESIS  10/29/2019     IR PARACENTESIS  11/25/2020     IR PARACENTESIS  08/11/2021     IR PARACENTESIS  01/28/2022     IR PARACENTESIS  03/30/2022     IR TRANSVEN INTRAHEPATIC PORTOSYST REV  10/29/2019     IR TRANSVEN INTRAHEPATIC PORTOSYST REV  11/25/2020     IR TRANSVEN INTRAHEPATIC PORTOSYST REV  08/11/2021     IR TRANSVEN INTRAHEPATIC PORTOSYST REV  01/28/2022     IR TRANSVEN INTRAHEPATIC PORTOSYST REV  03/30/2022     KNEE SURGERY Left      KNEE SURGERY Right      RELEASE CARPAL TUNNEL       RELEASE TRIGGER FINGER Right 06/11/2020    Procedure: RELEASE, TRIGGER FINGER, right ring and long finger;  Surgeon: Pascual Valencia MD;  Location: [facility]     SIGMOIDOSCOPY FLEXIBLE N/A 10/31/2017    Procedure: SIGMOIDOSCOPY FLEXIBLE;;  Surgeon: Armaan Adams MD;  Location:  GI     TIPS Procedure  06/06/2018     TIPS PROCEDURE  11/01/2020     ZZC PLASTY KNEE,MED OR LAT COMPARTMT Right 02/19/2021    Procedure: RIGHT UNICOMPARTMENTAL ARTHROPLASTY KNEE MEDIAL;  Surgeon: José Miguel Landaverde MD;  Location: [facility];  Service: Orthopedics     Current Outpatient Medications   Medication Sig Dispense Refill     acetaminophen (TYLENOL) 500 MG tablet Take 1,000 mg by mouth every 6 hours as needed for mild pain        Ascorbic Acid (VITAMIN C PO) Take by mouth daily       eplerenone (INSPRA) 50 MG tablet Take 2 tablets  (100 mg) by mouth 2 times daily (Patient taking differently: Take 100 mg by mouth 2 times daily Patient taking as ordered as of 8/16/22.) 340 tablet 3     fexofenadine (ALLEGRA) 60 MG tablet Take 1 tablet (60 mg) by mouth daily 30 tablet 1     furosemide (LASIX) 20 MG tablet Take 1 tablet (20 mg) by mouth every evening 90 tablet 3     furosemide (LASIX) 40 MG tablet Take 1 tablet (40 mg) by mouth every morning 90 tablet 3     hydrOXYzine (VISTARIL) 50 MG capsule Take 1 capsule (50 mg) by mouth at bedtime as needed, may repeat once (For anxiety and difficulty sleeping) 60 capsule 2     lactulose (CHRONULAC) 10 GM/15ML solution TAKE 30MLS BY MOUTH THREE TIMES DAILY AS NEEDED FOR CONSTIPATION (TAKE AS NEEDED TO MAINTAIN 3 TO 4 BOWEL MOVEMENTS DAILY) 2000 mL 11     Menaquinone-7 (VITAMIN K2) 100 MCG CAPS Take 200 mcg by mouth daily        MULTIPLE VITAMINS PO Take 1 tablet by mouth daily       potassium chloride ER (KLOR-CON M) 20 MEQ CR tablet Take 2 tablets (40 mEq) by mouth daily 120 tablet 2     rifaximin (XIFAXAN) 550 MG TABS tablet Take 1 tablet (550 mg) by mouth 2 times daily 180 tablet 3     sertraline (ZOLOFT) 25 MG tablet Take 1 tablet (25 mg) by mouth daily 90 tablet 3     sildenafil (REVATIO) 20 MG tablet Take 3-5 tabs 1/2-4 hours to relations 30 tablet 1     sodium bicarbonate 650 MG tablet Take 1 tablet (650 mg) by mouth 2 times daily 180 tablet 3     tamsulosin (FLOMAX) 0.4 MG capsule Take 1 capsule (0.4 mg) by mouth daily 30 capsule 0     traZODone (DESYREL) 50 MG tablet Take 1-2 tablets ( mg) by mouth nightly as needed for sleep 60 tablet 2     vitamin D3 (CHOLECALCIFEROL) 2000 units (50 mcg) tablet Take 1 tablet by mouth daily         Allergies   Allergen Reactions     Prednisone Visual Disturbance     Trazodone Visual Disturbance     Benadryl [Diphenhydramine] Other (See Comments)     Delirium (visual and auditory hallucinations)     Oxycodone Other (See Comments)     Delirium and  "constipation  Delirium and constipation        Social History     Tobacco Use     Smoking status: Former     Packs/day: 0.00     Types: Dip, chew, snus or snuff, Cigarettes     Quit date: 1998     Years since quittin.6     Smokeless tobacco: Former     Types: Chew     Tobacco comments:     1 tin per 10 days.   Vaping Use     Vaping status: Never Used   Substance Use Topics     Alcohol use: No     Alcohol/week: 17.5 standard drinks of alcohol     Types: 21 Cans of beer per week     Comment: 2019     Family History   Problem Relation Age of Onset     Family History Negative Mother      Family History Negative Father      Hypertension Father      Cerebrovascular Disease Father 87     Breast Cancer Maternal Grandmother      Substance Abuse Brother      Rheumatoid Arthritis Daughter      Depression Daughter      Cancer - colorectal No family hx of      Prostate Cancer No family hx of      Liver Disease No family hx of      History   Drug Use No         Objective     /70   Pulse 77   Temp 98.7  F (37.1  C) (Temporal)   Resp 18   Ht 1.803 m (5' 11\")   Wt 88.3 kg (194 lb 9.6 oz)   SpO2 98%   BMI 27.14 kg/m      Physical Exam  Constitutional:       General: He is not in acute distress.     Appearance: He is well-developed.   HENT:      Head: Normocephalic and atraumatic.      Nose: Nose normal.   Eyes:      Conjunctiva/sclera: Conjunctivae normal.   Neck:      Trachea: No tracheal deviation.   Cardiovascular:      Rate and Rhythm: Normal rate and regular rhythm.      Heart sounds: Normal heart sounds.   Pulmonary:      Effort: Pulmonary effort is normal.      Breath sounds: No wheezing.   Musculoskeletal:         General: Normal range of motion.      Cervical back: Normal range of motion.   Skin:     Findings: No erythema or rash.   Neurological:      Mental Status: He is alert and oriented to person, place, and time.   Psychiatric:         Behavior: Behavior normal.         Recent Labs   Lab Test " 04/10/23  0656 10/12/22  0712   HGB 11.0* 11.1*   PLT 74* 78*   INR 1.47* 1.63*    143   POTASSIUM 4.2 3.7   CR 1.70* 1.47*      4/12/23    1.  Multiple obstructing left mid and distal ureteral calculi  measuring up to 1.7 cm with associated moderate left  hydroureteronephrosis.  2.  Inflammatory changes of the left ureter are likely reactive.  Correlate with urinalysis to exclude infection.  3.  Bilateral nonobstructing calyceal stone.  4.  Stranding of the mesenteric root along the pancreatic head;  suspect due to early volume overload/congestion in the setting of  cirrhosis. However, correlate with patient history and/or lipase to  exclude pancreatitis.  5.  Cirrhosis and sequelae of portal hypertension. Recommend screening  for hepatocellular carcinoma per clinical guidelines.  6.  Gallstones measuring up to 3 cm. Consider outpatient follow-up  with general surgery    Diagnostics:  No labs were ordered during this visit.   No EKG required, no history of coronary heart disease, significant arrhythmia, peripheral arterial disease or other structural heart disease.        Revised Cardiac Risk Index (RCRI):  The patient has the following serious cardiovascular risks for perioperative complications:   - No serious cardiac risks = 0 points     RCRI Interpretation: 0 points: Class I (very low risk - 0.4% complication rate)           Signed Electronically by: Too Washington DO  Copy of this evaluation report is provided to requesting physician.

## 2023-04-23 ENCOUNTER — ANESTHESIA EVENT (OUTPATIENT)
Dept: SURGERY | Facility: CLINIC | Age: 58
End: 2023-04-23
Payer: COMMERCIAL

## 2023-04-23 ASSESSMENT — LIFESTYLE VARIABLES: TOBACCO_USE: 1

## 2023-04-23 NOTE — ANESTHESIA PREPROCEDURE EVALUATION
Anesthesia Pre-Procedure Evaluation    Patient: Ivan Bell   MRN: [id number] : 1965        Procedure : Procedure(s):  CYSTOSCOPY, LEFT RETROGRADE PYELOGRAM, LEFT URETEROSCOPY WITH LASER LITHOTRIOPSY AND BASKET REMOVAL OF STONES, LEFT URETERAL STENT PLACEMENT          Past Medical History:   Diagnosis Date     Alcoholic cirrhosis of liver (H)      Alcoholic hepatitis 2019     Chronic kidney disease     CRF     Depression      Gout      History of transfusion      Hypertension      Hypertriglyceridemia      Left calcaneus fracture 2006: Fell 10 feet from ladder onto left foot on frozen ground on 06 at home.  Immediate pain and unable to walk- seen at [place] and diagnosed with calcaneus fracture     Nephrolithiasis      Osteoarthritis      Portal vein thrombosis     left occlusion, partial main     Thrombocytopenia (H)       Past Surgical History:   Procedure Laterality Date     ANKLE SURGERY Left      ANKLE SURGERY       ARTHROSCOPY KNEE       COLONOSCOPY N/A 2016    Procedure: COLONOSCOPY;  Surgeon: Rhys Uriostegui MD;  Location: [facility]     COMBINED CYSTOSCOPY, RETROGRADES, URETEROSCOPY, LASER HOLMIUM LITHOTRIPSY URETER(S), INSERT STENT Bilateral 2022    Procedure: CYSTOSCOPY, RIGHT RETROGRADE PYELOGRAM, RIGHT URETEROSCOPY WITH LASER LITHOTRIPSY AND BASKET REMOVAL OF STONE, RIGHT URETERAL STENT PLACEMENT, LEFT RETROGRADE PYELOGRAM, LEFT URETEROSCOPY WITH LASER LITHOTRIPSY AND BASKET REMOVAL OF STONE, LEFT URETERAL STENT PLACEMENT;  Surgeon: Dylan Galaviz MD;  Location: [facility]     COMBINED CYSTOSCOPY, RETROGRADES, URETEROSCOPY, LASER HOLMIUM LITHOTRIPSY URETER(S), INSERT STENT Bilateral 2022    Procedure: CYSTOSCOPY, BILATERAL URETERAL STENT REMOVAL, BILATERAL  RETROGRADE PYELOGRAM, BILATERAL URETEROSCOPY. HOLMIUM LASER LITHOTRIPSY LEFT SIDE.  AND BASKET REMOVAL OF STONES BILATERAL, LEFT  URETERAL STENT PLACEMENT;  Surgeon: Dylan Galaviz MD;   Location:  OR     ESOPHAGOSCOPY, GASTROSCOPY, DUODENOSCOPY (EGD), COMBINED N/A 03/31/2016    Procedure: COMBINED ESOPHAGOSCOPY, GASTROSCOPY, DUODENOSCOPY (EGD);  Surgeon: Rhys Uriostegui MD;  Location:  GI     ESOPHAGOSCOPY, GASTROSCOPY, DUODENOSCOPY (EGD), COMBINED N/A 03/09/2018    Procedure: COMBINED ESOPHAGOSCOPY, GASTROSCOPY, DUODENOSCOPY (EGD), BIOPSY SINGLE OR MULTIPLE;  EGD;  Surgeon: Gonzalo Wahl MD;  Location:  GI     ESOPHAGOSCOPY, GASTROSCOPY, DUODENOSCOPY (EGD), COMBINED N/A 06/07/2019    Procedure: ESOPHAGOGASTRODUODENOSCOPY (EGD);  Surgeon: Gonzalo Wahl MD;  Location:  GI     FOOT SURGERY       IR PARACENTESIS  10/29/2019     IR PARACENTESIS  11/25/2020     IR PARACENTESIS  08/11/2021     IR PARACENTESIS  01/28/2022     IR PARACENTESIS  03/30/2022     IR TRANSVEN INTRAHEPATIC PORTOSYST REV  10/29/2019     IR TRANSVEN INTRAHEPATIC PORTOSYST REV  11/25/2020     IR TRANSVEN INTRAHEPATIC PORTOSYST REV  08/11/2021     IR TRANSVEN INTRAHEPATIC PORTOSYST REV  01/28/2022     IR TRANSVEN INTRAHEPATIC PORTOSYST REV  03/30/2022     KNEE SURGERY Left      KNEE SURGERY Right      RELEASE CARPAL TUNNEL       RELEASE TRIGGER FINGER Right 06/11/2020    Procedure: RELEASE, TRIGGER FINGER, right ring and long finger;  Surgeon: Pascual Valencia MD;  Location: [facility]     SIGMOIDOSCOPY FLEXIBLE N/A 10/31/2017    Procedure: SIGMOIDOSCOPY FLEXIBLE;;  Surgeon: Armaan Adams MD;  Location:  GI     TIPS Procedure  06/06/2018     TIPS PROCEDURE  11/01/2020     ZZC PLASTY KNEE,MED OR LAT COMPARTMT Right 02/19/2021    Procedure: RIGHT UNICOMPARTMENTAL ARTHROPLASTY KNEE MEDIAL;  Surgeon: José Miguel Landaverde MD;  Location: [facility];  Service: Orthopedics        Allergies   Allergen Reactions     Prednisone Visual Disturbance     Trazodone Visual Disturbance     Benadryl [Diphenhydramine] Other (See Comments)     Delirium (visual and auditory hallucinations)     Oxycodone  Other (See Comments)     Delirium and constipation  Delirium and constipation      Social History     Tobacco Use     Smoking status: Former     Packs/day: 0.00     Types: Dip, chew, snus or snuff, Cigarettes     Quit date: 1998     Years since quittin.6     Smokeless tobacco: Former     Types: Chew     Tobacco comments:     1 tin per 10 days.   Vaping Use     Vaping status: Never Used   Substance Use Topics     Alcohol use: No     Alcohol/week: 17.5 standard drinks of alcohol     Types: 21 Cans of beer per week     Comment: 2019      Wt Readings from Last 1 Encounters:   23 88.3 kg (194 lb 9.6 oz)        Anesthesia Evaluation            ROS/MED HX  ENT/Pulmonary:     (+) tobacco use, Past use,  (-) sleep apnea   Neurologic:  - neg neurologic ROS     Cardiovascular:     (+) Dyslipidemia hypertension-----    METS/Exercise Tolerance:     Hematologic: Comments: thrombocytopenia      Musculoskeletal:  - neg musculoskeletal ROS     GI/Hepatic: Comment: Cirrhosis      (+) hepatitis type Alcoholic, liver disease,     Renal/Genitourinary:     (+) renal disease, type: CRI, Pt does not require dialysis, Nephrolithiasis ,     Endo:       Psychiatric/Substance Use: Comment: Sober x 7yrs    (+) alcohol abuse     Infectious Disease:       Malignancy:       Other:            Physical Exam    Airway        Mallampati: II   TM distance: > 3 FB   Neck ROM: full   Mouth opening: > 3 cm    Respiratory Devices and Support         Dental       (+) Minor Abnormalities - some fillings, tiny chips      Cardiovascular   cardiovascular exam normal          Pulmonary   pulmonary exam normal                OUTSIDE LABS:  CBC:   Lab Results   Component Value Date    WBC 4.4 04/10/2023    WBC 4.0 10/12/2022    HGB 11.0 (L) 04/10/2023    HGB 11.1 (L) 10/12/2022    HCT 32.7 (L) 04/10/2023    HCT 34.4 (L) 10/12/2022    PLT 74 (L) 04/10/2023    PLT 78 (L) 10/12/2022     BMP:   Lab Results   Component Value Date     04/10/2023      10/12/2022    POTASSIUM 4.2 04/10/2023    POTASSIUM 3.7 10/12/2022    CHLORIDE 110 (H) 04/10/2023    CHLORIDE 111 (H) 10/12/2022    CO2 24 04/10/2023    CO2 25 10/12/2022    BUN 21.1 04/10/2023    BUN 14.9 10/12/2022    CR 1.70 (H) 04/10/2023    CR 1.47 (H) 10/12/2022    GLC 95 04/10/2023    GLC 82 10/12/2022     COAGS:   Lab Results   Component Value Date    PTT 41 (H) 02/02/2022    INR 1.47 (H) 04/10/2023    FIBR 181 (L) 03/10/2016     POC:   Lab Results   Component Value Date    BGM 77 06/06/2018     HEPATIC:   Lab Results   Component Value Date    ALBUMIN 2.9 (L) 04/10/2023    PROTTOTAL 5.6 (L) 04/10/2023    ALT <5 (L) 04/10/2023    AST 29 04/10/2023    GGT 3411 (H) 01/26/2004    ALKPHOS 124 04/10/2023    BILITOTAL 2.0 (H) 04/10/2023    LUISITO 62 (H) 06/04/2019     OTHER:   Lab Results   Component Value Date    LACT 1.4 11/01/2019    A1C 5.3 01/13/2008    JULISSA 9.5 04/10/2023    PHOS 2.5 08/30/2019    MAG 1.8 12/31/2019    LIPASE 327 02/02/2022    AMYLASE 66 01/26/2004    TSH 1.22 06/09/2018    CRP <2.9 05/27/2020    SED 9 05/27/2020       Anesthesia Plan    ASA Status:  3   NPO Status:  NPO Appropriate    Anesthesia Type: General.     - Airway: LMA   Induction: Intravenous, Propofol.   Maintenance: Balanced.        Consents    Anesthesia Plan(s) and associated risks, benefits, and realistic alternatives discussed. Questions answered and patient/representative(s) expressed understanding.    - Discussed:     - Discussed with:  Patient         Postoperative Care    Pain management: IV analgesics.   PONV prophylaxis: Ondansetron (or other 5HT-3), Dexamethasone or Solumedrol     Comments:                Dennys Wilsno DO,

## 2023-04-24 ENCOUNTER — APPOINTMENT (OUTPATIENT)
Dept: GENERAL RADIOLOGY | Facility: CLINIC | Age: 58
End: 2023-04-24
Attending: UROLOGY
Payer: COMMERCIAL

## 2023-04-24 ENCOUNTER — PREP FOR PROCEDURE (OUTPATIENT)
Dept: UROLOGY | Facility: CLINIC | Age: 58
End: 2023-04-24

## 2023-04-24 ENCOUNTER — HOSPITAL ENCOUNTER (OUTPATIENT)
Facility: CLINIC | Age: 58
Discharge: HOME | End: 2023-04-24
Attending: UROLOGY | Admitting: UROLOGY
Payer: COMMERCIAL

## 2023-04-24 ENCOUNTER — ANESTHESIA (OUTPATIENT)
Dept: SURGERY | Facility: CLINIC | Age: 58
End: 2023-04-24
Payer: COMMERCIAL

## 2023-04-24 VITALS
RESPIRATION RATE: 16 BRPM | BODY MASS INDEX: 25.83 KG/M2 | WEIGHT: 184.5 LBS | DIASTOLIC BLOOD PRESSURE: 72 MMHG | HEART RATE: 65 BPM | TEMPERATURE: 98 F | OXYGEN SATURATION: 99 % | SYSTOLIC BLOOD PRESSURE: 142 MMHG | HEIGHT: 71 IN

## 2023-04-24 DIAGNOSIS — N20.0 KIDNEY STONE ON LEFT SIDE: ICD-10-CM

## 2023-04-24 DIAGNOSIS — N20.1 LEFT URETERAL STONE: Primary | ICD-10-CM

## 2023-04-24 LAB
ANION GAP SERPL CALCULATED.3IONS-SCNC: 7 MMOL/L (ref 7–15)
BUN SERPL-MCNC: 13.9 MG/DL (ref 8–23)
CALCIUM SERPL-MCNC: 9.3 MG/DL (ref 8.6–10)
CHLORIDE SERPL-SCNC: 110 MMOL/L (ref 98–107)
CREAT SERPL-MCNC: 1.38 MG/DL (ref 0.67–1.17)
DEPRECATED HCO3 PLAS-SCNC: 23 MMOL/L (ref 22–29)
GFR SERPL CREATININE-BSD FRML MDRD: 59 ML/MIN/1.73M2
GLUCOSE SERPL-MCNC: 90 MG/DL (ref 70–99)
POTASSIUM SERPL-SCNC: 4 MMOL/L (ref 3.4–5.3)
SODIUM SERPL-SCNC: 140 MMOL/L (ref 136–145)

## 2023-04-24 PROCEDURE — 710N000012 HC RECOVERY PHASE 2, PER MINUTE: Performed by: UROLOGY

## 2023-04-24 PROCEDURE — 250N000009 HC RX 250: Performed by: NURSE ANESTHETIST, CERTIFIED REGISTERED

## 2023-04-24 PROCEDURE — C1769 GUIDE WIRE: HCPCS | Performed by: UROLOGY

## 2023-04-24 PROCEDURE — 250N000011 HC RX IP 250 OP 636: Performed by: NURSE ANESTHETIST, CERTIFIED REGISTERED

## 2023-04-24 PROCEDURE — 82310 ASSAY OF CALCIUM: CPT | Performed by: UROLOGY

## 2023-04-24 PROCEDURE — C2617 STENT, NON-COR, TEM W/O DEL: HCPCS | Performed by: UROLOGY

## 2023-04-24 PROCEDURE — 250N000011 HC RX IP 250 OP 636: Performed by: UROLOGY

## 2023-04-24 PROCEDURE — 82365 CALCULUS SPECTROSCOPY: CPT | Performed by: UROLOGY

## 2023-04-24 PROCEDURE — C1887 CATHETER, GUIDING: HCPCS | Performed by: UROLOGY

## 2023-04-24 PROCEDURE — 258N000003 HC RX IP 258 OP 636: Performed by: NURSE ANESTHETIST, CERTIFIED REGISTERED

## 2023-04-24 PROCEDURE — 999N000179 XR SURGERY CARM FLUORO LESS THAN 5 MIN W STILLS: Mod: TC

## 2023-04-24 PROCEDURE — 250N000009 HC RX 250: Performed by: UROLOGY

## 2023-04-24 PROCEDURE — 250N000013 HC RX MED GY IP 250 OP 250 PS 637: Performed by: ANESTHESIOLOGY

## 2023-04-24 PROCEDURE — 272N000001 HC OR GENERAL SUPPLY STERILE: Performed by: UROLOGY

## 2023-04-24 PROCEDURE — 370N000017 HC ANESTHESIA TECHNICAL FEE, PER MIN: Performed by: UROLOGY

## 2023-04-24 PROCEDURE — 360N000076 HC SURGERY LEVEL 3, PER MIN: Performed by: UROLOGY

## 2023-04-24 PROCEDURE — 710N000009 HC RECOVERY PHASE 1, LEVEL 1, PER MIN: Performed by: UROLOGY

## 2023-04-24 PROCEDURE — 52356 CYSTO/URETERO W/LITHOTRIPSY: CPT | Mod: LT | Performed by: UROLOGY

## 2023-04-24 PROCEDURE — 258N000001 HC RX 258: Performed by: UROLOGY

## 2023-04-24 PROCEDURE — 999N000141 HC STATISTIC PRE-PROCEDURE NURSING ASSESSMENT: Performed by: UROLOGY

## 2023-04-24 PROCEDURE — 250N000025 HC SEVOFLURANE, PER MIN: Performed by: UROLOGY

## 2023-04-24 PROCEDURE — 74420 UROGRAPHY RTRGR +-KUB: CPT | Mod: 26 | Performed by: UROLOGY

## 2023-04-24 DEVICE — URETERAL STENT
Type: IMPLANTABLE DEVICE | Site: URETHRA | Status: NON-FUNCTIONAL
Brand: POLARIS™ ULTRA
Removed: 2023-05-10

## 2023-04-24 RX ORDER — HYDROMORPHONE HCL IN WATER/PF 6 MG/30 ML
0.4 PATIENT CONTROLLED ANALGESIA SYRINGE INTRAVENOUS EVERY 5 MIN PRN
Status: DISCONTINUED | OUTPATIENT
Start: 2023-04-24 | End: 2023-04-24 | Stop reason: HOSPADM

## 2023-04-24 RX ORDER — FENTANYL CITRATE 50 UG/ML
25 INJECTION, SOLUTION INTRAMUSCULAR; INTRAVENOUS EVERY 5 MIN PRN
Status: DISCONTINUED | OUTPATIENT
Start: 2023-04-24 | End: 2023-04-24 | Stop reason: HOSPADM

## 2023-04-24 RX ORDER — ONDANSETRON 2 MG/ML
INJECTION INTRAMUSCULAR; INTRAVENOUS PRN
Status: DISCONTINUED | OUTPATIENT
Start: 2023-04-24 | End: 2023-04-24

## 2023-04-24 RX ORDER — ONDANSETRON 4 MG/1
4 TABLET, ORALLY DISINTEGRATING ORAL EVERY 30 MIN PRN
Status: DISCONTINUED | OUTPATIENT
Start: 2023-04-24 | End: 2023-04-24 | Stop reason: HOSPADM

## 2023-04-24 RX ORDER — TRAMADOL HYDROCHLORIDE 50 MG/1
50 TABLET ORAL ONCE
Status: COMPLETED | OUTPATIENT
Start: 2023-04-24 | End: 2023-04-24

## 2023-04-24 RX ORDER — HYDROMORPHONE HCL IN WATER/PF 6 MG/30 ML
0.2 PATIENT CONTROLLED ANALGESIA SYRINGE INTRAVENOUS EVERY 5 MIN PRN
Status: DISCONTINUED | OUTPATIENT
Start: 2023-04-24 | End: 2023-04-24 | Stop reason: HOSPADM

## 2023-04-24 RX ORDER — OXYBUTYNIN CHLORIDE 5 MG/1
5 TABLET, EXTENDED RELEASE ORAL DAILY
Qty: 30 TABLET | Refills: 0 | Status: SHIPPED | OUTPATIENT
Start: 2023-04-24 | End: 2023-09-13

## 2023-04-24 RX ORDER — OXYBUTYNIN CHLORIDE 5 MG/1
5 TABLET, EXTENDED RELEASE ORAL DAILY
Qty: 10 TABLET | Refills: 0 | Status: SHIPPED | OUTPATIENT
Start: 2023-04-24 | End: 2023-04-24

## 2023-04-24 RX ORDER — SODIUM CHLORIDE, SODIUM LACTATE, POTASSIUM CHLORIDE, CALCIUM CHLORIDE 600; 310; 30; 20 MG/100ML; MG/100ML; MG/100ML; MG/100ML
INJECTION, SOLUTION INTRAVENOUS CONTINUOUS
Status: DISCONTINUED | OUTPATIENT
Start: 2023-04-24 | End: 2023-04-24 | Stop reason: HOSPADM

## 2023-04-24 RX ORDER — FENTANYL CITRATE 50 UG/ML
INJECTION, SOLUTION INTRAMUSCULAR; INTRAVENOUS PRN
Status: DISCONTINUED | OUTPATIENT
Start: 2023-04-24 | End: 2023-04-24

## 2023-04-24 RX ORDER — FUROSEMIDE 10 MG/ML
INJECTION INTRAMUSCULAR; INTRAVENOUS PRN
Status: DISCONTINUED | OUTPATIENT
Start: 2023-04-24 | End: 2023-04-24

## 2023-04-24 RX ORDER — SODIUM CHLORIDE, SODIUM LACTATE, POTASSIUM CHLORIDE, CALCIUM CHLORIDE 600; 310; 30; 20 MG/100ML; MG/100ML; MG/100ML; MG/100ML
INJECTION, SOLUTION INTRAVENOUS CONTINUOUS PRN
Status: DISCONTINUED | OUTPATIENT
Start: 2023-04-24 | End: 2023-04-24

## 2023-04-24 RX ORDER — MAGNESIUM HYDROXIDE 1200 MG/15ML
LIQUID ORAL PRN
Status: DISCONTINUED | OUTPATIENT
Start: 2023-04-24 | End: 2023-04-24 | Stop reason: HOSPADM

## 2023-04-24 RX ORDER — LIDOCAINE HYDROCHLORIDE 20 MG/ML
INJECTION, SOLUTION INFILTRATION; PERINEURAL PRN
Status: DISCONTINUED | OUTPATIENT
Start: 2023-04-24 | End: 2023-04-24

## 2023-04-24 RX ORDER — DEXAMETHASONE SODIUM PHOSPHATE 4 MG/ML
INJECTION, SOLUTION INTRA-ARTICULAR; INTRALESIONAL; INTRAMUSCULAR; INTRAVENOUS; SOFT TISSUE PRN
Status: DISCONTINUED | OUTPATIENT
Start: 2023-04-24 | End: 2023-04-24

## 2023-04-24 RX ORDER — FENTANYL CITRATE 50 UG/ML
50 INJECTION, SOLUTION INTRAMUSCULAR; INTRAVENOUS EVERY 5 MIN PRN
Status: DISCONTINUED | OUTPATIENT
Start: 2023-04-24 | End: 2023-04-24 | Stop reason: HOSPADM

## 2023-04-24 RX ORDER — CEFAZOLIN SODIUM/WATER 2 G/20 ML
2 SYRINGE (ML) INTRAVENOUS
Status: COMPLETED | OUTPATIENT
Start: 2023-04-24 | End: 2023-04-24

## 2023-04-24 RX ORDER — CEFAZOLIN SODIUM/WATER 2 G/20 ML
2 SYRINGE (ML) INTRAVENOUS SEE ADMIN INSTRUCTIONS
Status: DISCONTINUED | OUTPATIENT
Start: 2023-04-24 | End: 2023-04-24 | Stop reason: HOSPADM

## 2023-04-24 RX ORDER — ONDANSETRON 2 MG/ML
4 INJECTION INTRAMUSCULAR; INTRAVENOUS EVERY 30 MIN PRN
Status: DISCONTINUED | OUTPATIENT
Start: 2023-04-24 | End: 2023-04-24 | Stop reason: HOSPADM

## 2023-04-24 RX ORDER — DEXMEDETOMIDINE HYDROCHLORIDE 4 UG/ML
INJECTION, SOLUTION INTRAVENOUS PRN
Status: DISCONTINUED | OUTPATIENT
Start: 2023-04-24 | End: 2023-04-24

## 2023-04-24 RX ORDER — PROPOFOL 10 MG/ML
INJECTION, EMULSION INTRAVENOUS PRN
Status: DISCONTINUED | OUTPATIENT
Start: 2023-04-24 | End: 2023-04-24

## 2023-04-24 RX ORDER — ASPIRIN 81 MG
100 TABLET, DELAYED RELEASE (ENTERIC COATED) ORAL DAILY
Qty: 60 TABLET | Refills: 1 | Status: ON HOLD | OUTPATIENT
Start: 2023-04-24 | End: 2023-09-19

## 2023-04-24 RX ORDER — TRAMADOL HYDROCHLORIDE 50 MG/1
50 TABLET ORAL EVERY 6 HOURS PRN
Qty: 10 TABLET | Refills: 0 | Status: SHIPPED | OUTPATIENT
Start: 2023-04-24 | End: 2023-04-27

## 2023-04-24 RX ORDER — IOPAMIDOL 612 MG/ML
INJECTION, SOLUTION INTRATHECAL PRN
Status: DISCONTINUED | OUTPATIENT
Start: 2023-04-24 | End: 2023-04-24 | Stop reason: HOSPADM

## 2023-04-24 RX ADMIN — FENTANYL CITRATE 50 MCG: 50 INJECTION, SOLUTION INTRAMUSCULAR; INTRAVENOUS at 14:34

## 2023-04-24 RX ADMIN — PHENYLEPHRINE HYDROCHLORIDE 100 MCG: 10 INJECTION INTRAVENOUS at 14:56

## 2023-04-24 RX ADMIN — Medication 2 G: at 14:30

## 2023-04-24 RX ADMIN — PHENYLEPHRINE HYDROCHLORIDE 100 MCG: 10 INJECTION INTRAVENOUS at 14:50

## 2023-04-24 RX ADMIN — DEXAMETHASONE SODIUM PHOSPHATE 4 MG: 4 INJECTION, SOLUTION INTRA-ARTICULAR; INTRALESIONAL; INTRAMUSCULAR; INTRAVENOUS; SOFT TISSUE at 14:39

## 2023-04-24 RX ADMIN — MIDAZOLAM 2 MG: 1 INJECTION INTRAMUSCULAR; INTRAVENOUS at 14:28

## 2023-04-24 RX ADMIN — FENTANYL CITRATE 50 MCG: 50 INJECTION, SOLUTION INTRAMUSCULAR; INTRAVENOUS at 14:38

## 2023-04-24 RX ADMIN — PROPOFOL 100 MG: 10 INJECTION, EMULSION INTRAVENOUS at 15:27

## 2023-04-24 RX ADMIN — SODIUM CHLORIDE, POTASSIUM CHLORIDE, SODIUM LACTATE AND CALCIUM CHLORIDE: 600; 310; 30; 20 INJECTION, SOLUTION INTRAVENOUS at 14:30

## 2023-04-24 RX ADMIN — DEXMEDETOMIDINE HYDROCHLORIDE 20 MCG: 200 INJECTION INTRAVENOUS at 15:48

## 2023-04-24 RX ADMIN — LIDOCAINE HYDROCHLORIDE 80 MG: 20 INJECTION, SOLUTION INFILTRATION; PERINEURAL at 14:34

## 2023-04-24 RX ADMIN — PROPOFOL 200 MG: 10 INJECTION, EMULSION INTRAVENOUS at 14:34

## 2023-04-24 RX ADMIN — ONDANSETRON 4 MG: 2 INJECTION INTRAMUSCULAR; INTRAVENOUS at 14:39

## 2023-04-24 RX ADMIN — TRAMADOL HYDROCHLORIDE 50 MG: 50 TABLET, COATED ORAL at 16:27

## 2023-04-24 RX ADMIN — FUROSEMIDE 20 MG: 10 INJECTION, SOLUTION INTRAVENOUS at 15:59

## 2023-04-24 ASSESSMENT — ACTIVITIES OF DAILY LIVING (ADL)
ADLS_ACUITY_SCORE: 20

## 2023-04-24 NOTE — OR NURSING
Blue cath removed per orders- Pt dressed, up in recliner and transported to Phase 2. Incontinent urine X2- then void 200 ml pink/red urine- [id number] sats >92% RA- Good PO intake, pain tolerable 5/10- Pt and responsible adult verbalize understanding of discharge instructions, denies questions. Up in W/C - transported to door for discharge to home.

## 2023-04-24 NOTE — DISCHARGE INSTRUCTIONS
Same Day Surgery Discharge Instructions for  Sedation and General Anesthesia     It's not unusual to feel dizzy, light-headed or faint for up to 24 hours after surgery or while taking pain medication.  If you have these symptoms: sit for a few minutes before standing and have someone assist you when you get up to walk or use the bathroom.    You should rest and relax for the next 24 hours. We recommend you make arrangements to have an adult stay with you for at least 24 hours after your discharge.  Avoid hazardous and strenuous activity.    DO NOT DRIVE any vehicle or operate mechanical equipment for 24 hours following the end of your surgery.  Even though you may feel normal, your reactions may be affected by the medication you have received.    Do not drink alcoholic beverages for 24 hours following surgery.     Slowly progress to your regular diet as you feel able. It's not unusual to feel nauseated and/or vomit after receiving anesthesia.  If you develop these symptoms, drink clear liquids (apple juice, ginger ale, broth, 7-up, etc. ) until you feel better.  If your nausea and vomiting persists for 24 hours, please notify your surgeon.      All narcotic pain medications, along with inactivity and anesthesia, can cause constipation. Drinking plenty of liquids and increasing fiber intake will help.    For any questions of a medical nature, call your surgeon.    Do not make important decisions for 24 hours.    If you had general anesthesia, you may have a sore throat for a couple of days related to the breathing tube used during surgery.  You may use Cepacol lozenges to help with this discomfort.  If it worsens or if you develop a fever, contact your surgeon.     If you feel your pain is not well managed with the pain medications prescribed by your surgeon, please contact your surgeon's office to let them know so they can address your concerns.              POSTOPERATIVE  INSTRUCTIONS    Diagnosis-------------------------------   Left ureteral and kidney stones    Procedure-------------------------------  Procedure(s) (LRB):  CYSTOSCOPY, LEFT RETROGRADE PYELOGRAM, LEFT URETEROSCOPY WITH LASER LITHOTRIOPSY AND BASKET REMOVAL OF STONES, LEFT URETERAL STENT PLACEMENT (Left)      Findings--------------------------------  Numerous stones in the left distal ureter fragmented and removed.  Left ureteral stent placed.    Home-going instructions-----------------         Activity Limitation:     - No driving or operating heavy machinery while on narcotic pain medication.     FOLLOW THESE INSTRUCTIONS AS INDICATED BELOW:  - Observe operative area for signs of excessive bleeding.  - You may shower.  - Increase fluid intake to promote clear urine.  - Resume usual diet as tolerated    What to expect while recovering-----------  - You may experience some intermittent bleeding that makes your urine pink or cherry colored. This is normal.  - However, if you are unable to urinate, passing large amount of clots, have jennifer blood in your urine, or have a temperature >101 degrees, call the urology nurse on call, or present to your nearest emergency department.  - You are encouraged to walk daily, and have no activity restrictions.   - A URETERAL STENT has been placed that allows urine to flow unobstructed from your kidney into your bladder.  The stent has a curl in the kidney and a curl in the bladder.  The curl in the bladder can cause some urgency and frequency of urination as well as some mild blood in the urine.  The curl in the kidney can cause some mild flank discomfort.  This may be more noticeable when you urinate.  A URETERAL STENT is meant to be left in temporarily.  It must be removed or changed no later than 3 months after it's insertion.  If it's not removed it can result in stone overgrowth on the stent that can cause pain, infection, and can be very difficult to remove.      Discharge  Medications/instructions:   - Flomax (tamsulosin) to be taken daily until stent is removed  - oxybutynin 5mg XL (Ditropan XL) to be taken daily until ureteral stent is removed  - Take Tylenol 1000mg every 6 hours for pain  - Take Ibuprofen 600mg every 6 hours as needed for additional pain control  - Take Tramadol 50mg every 4-6 hours only for break through pain  - Take Colace while taking Tramadol to prevent constipation      Questions/concerns------------------------  [facility]: [phone]    Future appointments  You will be contacted to schedule return for next ureteroscopy.    Dylan Galaviz MD       **If you have questions or concerns about your procedure,   call Dr. Galaviz at [phone]**

## 2023-04-24 NOTE — OP NOTE
OPERATIVE REPORT  DATE OF SURGERY: 04/24/23  LOCATION OF SURGERY: [facility]  PREOPERATIVE DIAGNOSIS:  (N20.1) Left ureteral stone  (primary encounter diagnosis)  (N20.0) Kidney stone on left side  POSTOPERATIVE DIAGNOSIS: (N20.1) Left ureteral stone  (primary encounter diagnosis)  (N20.0) Kidney stone on left side    START TIME: 2:48 PM  END TIME: 3:48 PM    PROCEDURE PERFORMED:   1. Cystoscopy  2. LEFT retrograde pyelogram  3. LEFT ureteroscopy with laser lithotripsy  4. LEFT ureteroscopy with basketing of stones  5. LEFT JJ stent placement  6. >1hr physician fluoroscopy time      STAFF SURGEON: Dylan Galaviz MD  ANESTHESIA: General.   ESTIMATED BLOOD LOSS: 5 mL.   DRAINS AND TUBES: LEFT 6fr x 26cm ureteral stent, 14fr coude catheter  COMPLICATIONS: None.   DISPOSITION: PACU.   SPECIMENS OBTAINED:   ID Type Source Tests Collected by Time Destination   A : left ureteral stone Calculus/Stone Ureter, Left STONE ANALYSIS Dylan Galaviz MD 4/24/2023  3:45 PM      SIGNIFICANT FINDINGS: Cystoscopy with no evidence of stones in the bladder.  Left ureteroscopy with evidence of a distal impacted ureteral stone as well as several additional large distal ureteral stones.  Stones fragmented and removed.  Ureteral stent placed.     HISTORY OF PRESENT ILLNESS: Ivan Bell is a 57 year old man with complicated medical history including alcoholic cirrhosis status post TI PS and paracentesis with baseline elevated INR.  History of significant bilateral nephrolithiasis status post staged ureteroscopy in July 2022.  He developed return of left-sided flank pain gradually over the last several months and a repeat CT scan demonstrated significant stone burden in the left distal ureter with associated hydroureteronephrosis.  Also evidence for significant left kidney stone burden.  He was counseled on the need for the above surgery with plans to clear the ureter at this stage and return for flexible ureteroscopy of the kidney at  her date.    OPERATION PERFORMED:   Informed consent was obtained and the patient was brought to the operating room where general anesthesia was induced. The patient was given appropriate preoperative antibiotics and positioned supine. The patient was then repositioned in dorsal lithotomy with all pressure points padded. We then performed a timeout, verifying the correct patient's site and procedure to be performed.    A 22 Kenyan cystoscope was inserted atraumatically into the bladder.  Cystoscopy was performed with no evidence of stones in the bladder.  Attempt was made to pass a 0.035 sensor wire up the left ureter, however resistance was met after 2 to 3 cm.  Attempt was made to pass an angled stiff Glidewire, however again resistance was met and the wire would not pass.  The cystoscope was removed.  A semirigid ureteroscope was assembled and inserted atraumatically into the bladder.  The ureteral orifice was able to be cannulated using a 0.035 sensor wire for guidance.  The ureteroscope was advanced up to the distal ureter 2 to 3 cm where the distalmost stone was encountered and noted to have fairly significant impaction.  Under direct visualization the 0.035 sensor wire was able to pass next to the stone and next to the subsequent distal ureteral stones up to the renal pelvis and the semirigid ureteroscope was removed.  A 14 Kenyan coudé catheter was placed for bladder drainage.  The ureteroscope was replaced into the bladder and into the ureteral orifice.  The stone was dusted and fragmented with holmium laser lithotripsy and fragments were basketed and placed in the bladder.  Subsequent stones were also dusted and fragmented with holmium laser lithotripsy and stone fragments were placed in the bladder.  Following clearance of the distal ureter the ureteroscope was advanced up to the mid ureter with no additional stone fragments noted.  A gentle retrograde pyelogram was performed with no further clear  evidence of stones in the ureter and well visualized stones in the kidney.  The ureter was inspected with no evidence of ureteral injury though there was significant edema at the site of the most distal stone impaction.  The ureteroscope and the Blue catheter removed.  The cystoscope was replaced in the bladder and a 6 Comoran by 26 cm JJ ureteral stent was advanced over the wire with good curl noted in the renal pelvis fluoroscopically and in the bladder and direct vision.  The bladder was irrigated and all stone fragments were removed.  He received 10 mg IV Lasix.  The cystoscope was removed and the 14 Comoran coudé catheter was placed with 10 cc in the balloon.  He was emerged from anesthesia and taken to the recovery room in stable condition.    Dylan Galaviz MD   Urology  [facility]  Clinic Phone [phone]

## 2023-04-24 NOTE — ANESTHESIA POSTPROCEDURE EVALUATION
Patient: Ivan Bell    Procedure: Procedure(s):  CYSTOSCOPY, LEFT RETROGRADE PYELOGRAM, LEFT URETEROSCOPY WITH LASER LITHOTRIOPSY AND BASKET REMOVAL OF STONES, LEFT URETERAL STENT PLACEMENT       Anesthesia Type:  General    Note:     Postop Pain Control: Uneventful            Sign Out: Well controlled pain   PONV: No   Neuro/Psych: Uneventful            Sign Out: Acceptable/Baseline neuro status   Airway/Respiratory: Uneventful            Sign Out: Acceptable/Baseline resp. status   CV/Hemodynamics: Uneventful            Sign Out: Acceptable CV status   Other NRE: NONE   DID A NON-ROUTINE EVENT OCCUR?            Last vitals:  Vitals Value Taken Time   /81 04/24/23 1630   Temp 36.4  C (97.6  F) 04/24/23 1557   Pulse 74 04/24/23 1638   Resp 18 04/24/23 1638   SpO2 96 % 04/24/23 1638   Vitals shown include unvalidated device data.    Electronically Signed By: Tian Mills MD  April 24, 2023  4:39 PM

## 2023-04-24 NOTE — ANESTHESIA CARE TRANSFER NOTE
Patient: Ivan Bell    Procedure: Procedure(s):  CYSTOSCOPY, LEFT RETROGRADE PYELOGRAM, LEFT URETEROSCOPY WITH LASER LITHOTRIOPSY AND BASKET REMOVAL OF STONES, LEFT URETERAL STENT PLACEMENT       Diagnosis: Left ureteral stone [N20.1]  Diagnosis Additional Information: No value filed.    Anesthesia Type:   General     Note:    Oropharynx: oropharynx clear of all foreign objects  Level of Consciousness: drowsy  Oxygen Supplementation: face mask  Level of Supplemental Oxygen (L/min / FiO2): 6  Independent Airway: airway patency satisfactory and stable  Dentition: dentition unchanged  Vital Signs Stable: post-procedure vital signs reviewed and stable  Report to RN Given: handoff report given  Patient transferred to: PACU    Handoff Report: Identifed the Patient, Identified the Reponsible Provider, Reviewed the pertinent medical history, Discussed the surgical course, Reviewed Intra-OP anesthesia mangement and issues during anesthesia, Set expectations for post-procedure period and Allowed opportunity for questions and acknowledgement of understanding      Vitals:  Vitals Value Taken Time   /63 04/24/23 1557   Temp     Pulse 70 04/24/23 1559   Resp 14 04/24/23 1559   SpO2 100 % 04/24/23 1559   Vitals shown include unvalidated device data.    Electronically Signed By: GILLIAN John CRNA  April 24, 2023  3:59 PM

## 2023-04-25 ENCOUNTER — PATIENT OUTREACH (OUTPATIENT)
Dept: GASTROENTEROLOGY | Facility: CLINIC | Age: 58
End: 2023-04-25
Payer: COMMERCIAL

## 2023-04-25 DIAGNOSIS — K70.30 ALCOHOLIC CIRRHOSIS OF LIVER WITHOUT ASCITES (H): Primary | ICD-10-CM

## 2023-04-25 NOTE — PROGRESS NOTES
Patient called to ask if a colonoscopy can be ordered for him, as he thinks he may be due, and has also just had a friend pass from colon cancer. Colonoscopy with moderate sedation non-urgent, to be performed by Dr. Bales, ordered.

## 2023-04-26 ENCOUNTER — TELEPHONE (OUTPATIENT)
Dept: UROLOGY | Facility: CLINIC | Age: 58
End: 2023-04-26
Payer: COMMERCIAL

## 2023-04-28 ENCOUNTER — TELEPHONE (OUTPATIENT)
Dept: UROLOGY | Facility: CLINIC | Age: 58
End: 2023-04-28
Payer: COMMERCIAL

## 2023-04-28 LAB
APPEARANCE STONE: NORMAL
COMPN STONE: NORMAL
SPECIMEN WT: 454 MG

## 2023-04-28 NOTE — TELEPHONE ENCOUNTER
4/28 Called patient and left voicemail. Provided patient with [phone] to reschedule appointment on 5/2 to 5/18 at 9:30 AM. Ok to double book per provider.     Rylie wheeler Procedure   Dermatology, Surgery, Urology  [facility] [place]    ----- Message from Lory Hoang RN sent at 4/28/2023  3:37 PM CDT -----  Hello,    Are you able to contact patient to assist in rescheduling the 5/2/23 and rescheduling him to 5/18/23 at 9:30am with Dr. Galaviz? Okay to double book.    Thank you,    Lory Hoang RN, BSN      ----- Message -----  From: Kasey Olivier  Sent: 4/25/2023   4:15 PM CDT  To: [facility]    This pt needs another surgery so he does not need a sent out appt on 5/2 anymore but he does need one on 5/18 can you work him in that day in ?    Kasey

## 2023-05-01 ENCOUNTER — PATIENT OUTREACH (OUTPATIENT)
Dept: GASTROENTEROLOGY | Facility: CLINIC | Age: 58
End: 2023-05-01
Payer: COMMERCIAL

## 2023-05-01 NOTE — TELEPHONE ENCOUNTER
5/1 Called patient and left voicemail. Provided patient with [phone] to reschedule to 5/18 at 9:30 AM.     Rylie wheeler Procedure   Dermatology, Surgery, Urology  [facility]

## 2023-05-01 NOTE — PROGRESS NOTES
Patient called to review upcoming appointments and get assistance with call back numbers for scheduling for his renal issues. Patient has not yet heard from endoscopy regarding scheduling of colonoscopy, provided this number as well. Patient plans to call to get scheduled himself. He has no further questions or concerns at this time.

## 2023-05-07 ENCOUNTER — ANESTHESIA EVENT (OUTPATIENT)
Dept: SURGERY | Facility: CLINIC | Age: 58
End: 2023-05-07
Payer: COMMERCIAL

## 2023-05-08 NOTE — ADDENDUM NOTE
Addendum  created 05/07/23 2206 by Dennys Wilson DO    Attestation recorded in Intraprocedure, Intraprocedure Attestations filed

## 2023-05-09 ENCOUNTER — TELEPHONE (OUTPATIENT)
Dept: GASTROENTEROLOGY | Facility: CLINIC | Age: 58
End: 2023-05-09
Payer: COMMERCIAL

## 2023-05-09 ENCOUNTER — HOSPITAL ENCOUNTER (OUTPATIENT)
Facility: AMBULATORY SURGERY CENTER | Age: 58
End: 2023-05-09
Attending: INTERNAL MEDICINE | Admitting: INTERNAL MEDICINE
Payer: COMMERCIAL

## 2023-05-09 NOTE — TELEPHONE ENCOUNTER
Screening Questions  BLUE  KIND OF PREP RED  LOCATION [review exclusion criteria] GREEN  SEDATION TYPE        Y Are you active on mychart?       MAGDALENE WATKINS Ordering/Referring Provider?        HP What type of coverage do you have?      N Have you had a positive covid test in the last 14 days?     27.1 1. BMI  [BMI 40+ - review exclusion criteria& smart-phrase document]    Y  2. Are you able to give consent for your medical care? [IF NO,RN REVIEW]          N  3. Are you taking any prescription pain medications on a routine schedule   (ex narcotics: oxycodone, roxicodone, oxycontin,  and percocet)? [RN Review]          3a. EXTENDED PREP What kind of prescription?     N 4. Do you have any chemical dependencies such as alcohol, street drugs, or methadone?        **If yes 3- 5 , please schedule with MAC sedation.**          IF YES TO ANY 6 - 10 - HOSPITAL SETTING ONLY.     N 6.   Do you need assistance transferring?     N 7.   Have you had a heart or lung transplant?    N 8.   Are you currently on dialysis?   N 9.   Do you use daily home oxygen?   N 10. Do you take nitroglycerin?   10a.  If yes, how often?     11. [FEMALES]   Are you currently pregnant?    11a.  If yes, how many weeks? [ Greater than 12 weeks, OR NEEDED]    N 12. Do you have Pulmonary Hypertension? *NEED PAC APPT AT [facility] w/ [facility]*     N 13. [review exclusion criteria]  Do you have any implantable devices in your body (pacemaker, defib, LVAD)?    N 14. In the past 6 months, have you had any heart related issues including cardiomyopathy or heart attack?     14a.  If yes, did it require cardiac stenting if so when?     N 15. Have you had a stroke or Transient ischemic attack (TIA - aka  mini stroke ) within 6 months?      N 16. Do you have mod to severe Obstructive Sleep Apnea?  [Hospital only]    N 17. Do you have SEVERE AND UNCONTROLLED asthma? *NEED PAC APPT AT [facility] w/[facility]*     18. Are you currently taking any blood thinners?     18a. No.  "Continue to 19.   18b. Yes/no Blood Thinner: No. Inform patient to \"follow up w/ ordering provider for bridging instructions.\"    N 19. Do you take the medication named Phentermine?    19a. If yes, \"Hold for 7 days before procedure.  Please consult your prescribing provider if you have questions about holding this medication.\"     N  20. Do you have chronic kidney disease?      N  21. Do you have a diagnosis of diabetes?     N  22. On a regular basis do you go 3-5 days between bowel movements?      23. Preferred LOCAL Pharmacy for Pre Prescription    [ LIST ONLY ONE PHARMACY]      [facility] [place] - [address] [facility]    - CLOSING REMINDERS -    Informed patient they will need an adult    Cannot take any type of public or medical transportation alone    Conscious Sedation- Needs  for 6 hours after the procedure       MAC/General-Needs  for 24 hours after procedure    Pre-Procedure Covid Home test to be completed [[place] Required]    Confirmed Nurse will call to complete assessment   Failure to complete the Nurse assessment may result in the procedure being cancelled      - SCHEDULING DETAILS -  NO Hospital Setting Required? If yes, what is the exclusion?:    HERRING  Surgeon    8/25/2023  Date of Procedure  Lower Endoscopy [Colonoscopy]  Type of Procedure Scheduled  [facility] Location   MIRALAX GATORADE WITHOUT MAGNEISUM CITRATE Which Colonoscopy Prep was Sent?     MODERATE Sedation Type     NO PAC / Pre-op Required                 "

## 2023-05-09 NOTE — TELEPHONE ENCOUNTER
LVM & sent mychart // pt needs to reschedule appt on 10.10.23 with Dr. Bales (& the labs and imaging) // first attempt, AN 5.9.23   Problem: Patient Centered Care  Goal: Patient preferences are identified and integrated in the patient's plan of care  Description: Interventions:  - What would you like us to know as we care for you?  \"I want to know where my wife is\"  - Provide timely fever/infection during anticipated neutropenic period  Description: INTERVENTIONS  - Monitor WBC  - Administer growth factors as ordered  - Implement neutropenic guidelines  Outcome: Progressing     Problem: SAFETY ADULT - FALL  Goal: Free from fall injury wakefulness i.e. lights on, blinds open  - Promote sleep, encourage patient's normal rest cycle i.e. lights off, TV off, minimize noise and interruptions  - Encourage family to assist in orientation and promotion of home routines  Outcome: Progressing

## 2023-05-10 ENCOUNTER — APPOINTMENT (OUTPATIENT)
Dept: GENERAL RADIOLOGY | Facility: CLINIC | Age: 58
End: 2023-05-10
Attending: UROLOGY
Payer: COMMERCIAL

## 2023-05-10 ENCOUNTER — HOSPITAL ENCOUNTER (OUTPATIENT)
Facility: CLINIC | Age: 58
Discharge: HOME | End: 2023-05-10
Attending: UROLOGY | Admitting: UROLOGY
Payer: COMMERCIAL

## 2023-05-10 ENCOUNTER — ANESTHESIA (OUTPATIENT)
Dept: SURGERY | Facility: CLINIC | Age: 58
End: 2023-05-10
Payer: COMMERCIAL

## 2023-05-10 VITALS
OXYGEN SATURATION: 97 % | TEMPERATURE: 97.1 F | HEART RATE: 79 BPM | HEIGHT: 71 IN | DIASTOLIC BLOOD PRESSURE: 82 MMHG | WEIGHT: 178.6 LBS | RESPIRATION RATE: 15 BRPM | BODY MASS INDEX: 25 KG/M2 | SYSTOLIC BLOOD PRESSURE: 149 MMHG

## 2023-05-10 DIAGNOSIS — N20.0 KIDNEY STONE ON LEFT SIDE: Primary | ICD-10-CM

## 2023-05-10 DIAGNOSIS — N20.1 LEFT URETERAL STONE: ICD-10-CM

## 2023-05-10 LAB
CREAT SERPL-MCNC: 1.48 MG/DL (ref 0.67–1.17)
GFR SERPL CREATININE-BSD FRML MDRD: 54 ML/MIN/1.73M2
POTASSIUM SERPL-SCNC: 3.9 MMOL/L (ref 3.4–5.3)
SODIUM SERPL-SCNC: 144 MMOL/L (ref 136–145)

## 2023-05-10 PROCEDURE — 250N000013 HC RX MED GY IP 250 OP 250 PS 637: Performed by: UROLOGY

## 2023-05-10 PROCEDURE — C1758 CATHETER, URETERAL: HCPCS | Performed by: UROLOGY

## 2023-05-10 PROCEDURE — 999N000179 XR SURGERY CARM FLUORO LESS THAN 5 MIN W STILLS: Mod: TC

## 2023-05-10 PROCEDURE — 82565 ASSAY OF CREATININE: CPT | Performed by: ANESTHESIOLOGY

## 2023-05-10 PROCEDURE — 710N000009 HC RECOVERY PHASE 1, LEVEL 1, PER MIN: Performed by: UROLOGY

## 2023-05-10 PROCEDURE — 258N000003 HC RX IP 258 OP 636: Performed by: NURSE ANESTHETIST, CERTIFIED REGISTERED

## 2023-05-10 PROCEDURE — 258N000001 HC RX 258: Performed by: UROLOGY

## 2023-05-10 PROCEDURE — 84132 ASSAY OF SERUM POTASSIUM: CPT | Performed by: ANESTHESIOLOGY

## 2023-05-10 PROCEDURE — 82365 CALCULUS SPECTROSCOPY: CPT | Performed by: UROLOGY

## 2023-05-10 PROCEDURE — 250N000011 HC RX IP 250 OP 636: Performed by: UROLOGY

## 2023-05-10 PROCEDURE — 999N000141 HC STATISTIC PRE-PROCEDURE NURSING ASSESSMENT: Performed by: UROLOGY

## 2023-05-10 PROCEDURE — 250N000009 HC RX 250: Performed by: UROLOGY

## 2023-05-10 PROCEDURE — 360N000076 HC SURGERY LEVEL 3, PER MIN: Performed by: UROLOGY

## 2023-05-10 PROCEDURE — 74420 UROGRAPHY RTRGR +-KUB: CPT | Mod: 26 | Performed by: UROLOGY

## 2023-05-10 PROCEDURE — 36415 COLL VENOUS BLD VENIPUNCTURE: CPT | Performed by: ANESTHESIOLOGY

## 2023-05-10 PROCEDURE — 272N000001 HC OR GENERAL SUPPLY STERILE: Performed by: UROLOGY

## 2023-05-10 PROCEDURE — 255N000002 HC RX 255 OP 636: Performed by: UROLOGY

## 2023-05-10 PROCEDURE — 258N000003 HC RX IP 258 OP 636: Performed by: ANESTHESIOLOGY

## 2023-05-10 PROCEDURE — 250N000009 HC RX 250: Performed by: NURSE ANESTHETIST, CERTIFIED REGISTERED

## 2023-05-10 PROCEDURE — 250N000025 HC SEVOFLURANE, PER MIN: Performed by: UROLOGY

## 2023-05-10 PROCEDURE — 250N000011 HC RX IP 250 OP 636: Performed by: NURSE ANESTHETIST, CERTIFIED REGISTERED

## 2023-05-10 PROCEDURE — C2617 STENT, NON-COR, TEM W/O DEL: HCPCS | Performed by: UROLOGY

## 2023-05-10 PROCEDURE — 52356 CYSTO/URETERO W/LITHOTRIPSY: CPT | Mod: 22 | Performed by: UROLOGY

## 2023-05-10 PROCEDURE — 370N000017 HC ANESTHESIA TECHNICAL FEE, PER MIN: Performed by: UROLOGY

## 2023-05-10 PROCEDURE — C1894 INTRO/SHEATH, NON-LASER: HCPCS | Performed by: UROLOGY

## 2023-05-10 PROCEDURE — 710N000012 HC RECOVERY PHASE 2, PER MINUTE: Performed by: UROLOGY

## 2023-05-10 PROCEDURE — 84295 ASSAY OF SERUM SODIUM: CPT | Performed by: ANESTHESIOLOGY

## 2023-05-10 PROCEDURE — C1769 GUIDE WIRE: HCPCS | Performed by: UROLOGY

## 2023-05-10 DEVICE — URETERAL STENT
Type: IMPLANTABLE DEVICE | Site: URETER | Status: FUNCTIONAL
Brand: POLARIS™ ULTRA

## 2023-05-10 RX ORDER — OXYCODONE HYDROCHLORIDE 5 MG/1
5 TABLET ORAL EVERY 6 HOURS PRN
Qty: 9 TABLET | Refills: 0 | Status: SHIPPED | OUTPATIENT
Start: 2023-05-10 | End: 2023-05-13

## 2023-05-10 RX ORDER — FENTANYL CITRATE 50 UG/ML
INJECTION, SOLUTION INTRAMUSCULAR; INTRAVENOUS PRN
Status: DISCONTINUED | OUTPATIENT
Start: 2023-05-10 | End: 2023-05-10

## 2023-05-10 RX ORDER — FENTANYL CITRATE 0.05 MG/ML
50 INJECTION, SOLUTION INTRAMUSCULAR; INTRAVENOUS
Status: CANCELLED | OUTPATIENT
Start: 2023-05-10

## 2023-05-10 RX ORDER — DEXAMETHASONE SODIUM PHOSPHATE 4 MG/ML
INJECTION, SOLUTION INTRA-ARTICULAR; INTRALESIONAL; INTRAMUSCULAR; INTRAVENOUS; SOFT TISSUE PRN
Status: DISCONTINUED | OUTPATIENT
Start: 2023-05-10 | End: 2023-05-10

## 2023-05-10 RX ORDER — SODIUM CHLORIDE, SODIUM LACTATE, POTASSIUM CHLORIDE, CALCIUM CHLORIDE 600; 310; 30; 20 MG/100ML; MG/100ML; MG/100ML; MG/100ML
INJECTION, SOLUTION INTRAVENOUS CONTINUOUS PRN
Status: DISCONTINUED | OUTPATIENT
Start: 2023-05-10 | End: 2023-05-10

## 2023-05-10 RX ORDER — CEFAZOLIN SODIUM/WATER 2 G/20 ML
2 SYRINGE (ML) INTRAVENOUS
Status: DISCONTINUED | OUTPATIENT
Start: 2023-05-10 | End: 2023-05-10 | Stop reason: HOSPADM

## 2023-05-10 RX ORDER — HYDROMORPHONE HCL IN WATER/PF 6 MG/30 ML
0.2 PATIENT CONTROLLED ANALGESIA SYRINGE INTRAVENOUS EVERY 5 MIN PRN
Status: DISCONTINUED | OUTPATIENT
Start: 2023-05-10 | End: 2023-05-10 | Stop reason: HOSPADM

## 2023-05-10 RX ORDER — HYDROMORPHONE HCL IN WATER/PF 6 MG/30 ML
0.4 PATIENT CONTROLLED ANALGESIA SYRINGE INTRAVENOUS EVERY 5 MIN PRN
Status: DISCONTINUED | OUTPATIENT
Start: 2023-05-10 | End: 2023-05-10 | Stop reason: HOSPADM

## 2023-05-10 RX ORDER — OXYCODONE HYDROCHLORIDE 5 MG/1
5 TABLET ORAL EVERY 4 HOURS PRN
Status: DISCONTINUED | OUTPATIENT
Start: 2023-05-10 | End: 2023-05-10 | Stop reason: HOSPADM

## 2023-05-10 RX ORDER — FUROSEMIDE 10 MG/ML
INJECTION INTRAMUSCULAR; INTRAVENOUS PRN
Status: DISCONTINUED | OUTPATIENT
Start: 2023-05-10 | End: 2023-05-10

## 2023-05-10 RX ORDER — FENTANYL CITRATE 0.05 MG/ML
50 INJECTION, SOLUTION INTRAMUSCULAR; INTRAVENOUS EVERY 5 MIN PRN
Status: DISCONTINUED | OUTPATIENT
Start: 2023-05-10 | End: 2023-05-10 | Stop reason: HOSPADM

## 2023-05-10 RX ORDER — ONDANSETRON 2 MG/ML
4 INJECTION INTRAMUSCULAR; INTRAVENOUS EVERY 30 MIN PRN
Status: DISCONTINUED | OUTPATIENT
Start: 2023-05-10 | End: 2023-05-10 | Stop reason: HOSPADM

## 2023-05-10 RX ORDER — CEFAZOLIN SODIUM/WATER 2 G/20 ML
2 SYRINGE (ML) INTRAVENOUS SEE ADMIN INSTRUCTIONS
Status: DISCONTINUED | OUTPATIENT
Start: 2023-05-10 | End: 2023-05-10 | Stop reason: HOSPADM

## 2023-05-10 RX ORDER — ONDANSETRON 2 MG/ML
INJECTION INTRAMUSCULAR; INTRAVENOUS PRN
Status: DISCONTINUED | OUTPATIENT
Start: 2023-05-10 | End: 2023-05-10

## 2023-05-10 RX ORDER — MAGNESIUM HYDROXIDE 1200 MG/15ML
LIQUID ORAL PRN
Status: DISCONTINUED | OUTPATIENT
Start: 2023-05-10 | End: 2023-05-10 | Stop reason: HOSPADM

## 2023-05-10 RX ORDER — FENTANYL CITRATE 0.05 MG/ML
25 INJECTION, SOLUTION INTRAMUSCULAR; INTRAVENOUS EVERY 5 MIN PRN
Status: DISCONTINUED | OUTPATIENT
Start: 2023-05-10 | End: 2023-05-10 | Stop reason: HOSPADM

## 2023-05-10 RX ORDER — SODIUM CHLORIDE, SODIUM LACTATE, POTASSIUM CHLORIDE, CALCIUM CHLORIDE 600; 310; 30; 20 MG/100ML; MG/100ML; MG/100ML; MG/100ML
INJECTION, SOLUTION INTRAVENOUS CONTINUOUS
Status: DISCONTINUED | OUTPATIENT
Start: 2023-05-10 | End: 2023-05-10 | Stop reason: HOSPADM

## 2023-05-10 RX ORDER — PROPOFOL 10 MG/ML
INJECTION, EMULSION INTRAVENOUS PRN
Status: DISCONTINUED | OUTPATIENT
Start: 2023-05-10 | End: 2023-05-10

## 2023-05-10 RX ORDER — LIDOCAINE 40 MG/G
CREAM TOPICAL
Status: DISCONTINUED | OUTPATIENT
Start: 2023-05-10 | End: 2023-05-10 | Stop reason: HOSPADM

## 2023-05-10 RX ORDER — LIDOCAINE HYDROCHLORIDE 20 MG/ML
INJECTION, SOLUTION INFILTRATION; PERINEURAL PRN
Status: DISCONTINUED | OUTPATIENT
Start: 2023-05-10 | End: 2023-05-10

## 2023-05-10 RX ORDER — ASPIRIN 81 MG
100 TABLET, DELAYED RELEASE (ENTERIC COATED) ORAL DAILY
Qty: 60 TABLET | Refills: 1 | Status: SHIPPED | OUTPATIENT
Start: 2023-05-10

## 2023-05-10 RX ORDER — ONDANSETRON 4 MG/1
4 TABLET, ORALLY DISINTEGRATING ORAL EVERY 30 MIN PRN
Status: DISCONTINUED | OUTPATIENT
Start: 2023-05-10 | End: 2023-05-10 | Stop reason: HOSPADM

## 2023-05-10 RX ORDER — MEPERIDINE HYDROCHLORIDE 25 MG/ML
12.5 INJECTION INTRAMUSCULAR; INTRAVENOUS; SUBCUTANEOUS EVERY 5 MIN PRN
Status: DISCONTINUED | OUTPATIENT
Start: 2023-05-10 | End: 2023-05-10 | Stop reason: HOSPADM

## 2023-05-10 RX ORDER — KETOROLAC TROMETHAMINE 30 MG/ML
INJECTION, SOLUTION INTRAMUSCULAR; INTRAVENOUS PRN
Status: DISCONTINUED | OUTPATIENT
Start: 2023-05-10 | End: 2023-05-10

## 2023-05-10 RX ADMIN — ONDANSETRON 4 MG: 2 INJECTION INTRAMUSCULAR; INTRAVENOUS at 09:08

## 2023-05-10 RX ADMIN — FENTANYL CITRATE 50 MCG: 50 INJECTION, SOLUTION INTRAMUSCULAR; INTRAVENOUS at 10:14

## 2023-05-10 RX ADMIN — OXYCODONE HYDROCHLORIDE 5 MG: 5 TABLET ORAL at 11:56

## 2023-05-10 RX ADMIN — FUROSEMIDE 20 MG: 10 INJECTION, SOLUTION INTRAVENOUS at 10:33

## 2023-05-10 RX ADMIN — PROPOFOL 200 MG: 10 INJECTION, EMULSION INTRAVENOUS at 08:24

## 2023-05-10 RX ADMIN — KETOROLAC TROMETHAMINE 15 MG: 30 INJECTION, SOLUTION INTRAMUSCULAR at 10:35

## 2023-05-10 RX ADMIN — SODIUM CHLORIDE, POTASSIUM CHLORIDE, SODIUM LACTATE AND CALCIUM CHLORIDE: 600; 310; 30; 20 INJECTION, SOLUTION INTRAVENOUS at 07:13

## 2023-05-10 RX ADMIN — DEXAMETHASONE SODIUM PHOSPHATE 4 MG: 4 INJECTION, SOLUTION INTRA-ARTICULAR; INTRALESIONAL; INTRAMUSCULAR; INTRAVENOUS; SOFT TISSUE at 08:34

## 2023-05-10 RX ADMIN — FENTANYL CITRATE 50 MCG: 50 INJECTION, SOLUTION INTRAMUSCULAR; INTRAVENOUS at 08:24

## 2023-05-10 RX ADMIN — MIDAZOLAM 2 MG: 1 INJECTION INTRAMUSCULAR; INTRAVENOUS at 08:21

## 2023-05-10 RX ADMIN — Medication 2 G: at 08:33

## 2023-05-10 RX ADMIN — LIDOCAINE HYDROCHLORIDE 80 MG: 20 INJECTION, SOLUTION INFILTRATION; PERINEURAL at 08:24

## 2023-05-10 RX ADMIN — PHENYLEPHRINE HYDROCHLORIDE 100 MCG: 10 INJECTION INTRAVENOUS at 08:51

## 2023-05-10 RX ADMIN — FENTANYL CITRATE 50 MCG: 50 INJECTION, SOLUTION INTRAMUSCULAR; INTRAVENOUS at 08:48

## 2023-05-10 RX ADMIN — SODIUM CHLORIDE, POTASSIUM CHLORIDE, SODIUM LACTATE AND CALCIUM CHLORIDE: 600; 310; 30; 20 INJECTION, SOLUTION INTRAVENOUS at 08:21

## 2023-05-10 ASSESSMENT — ACTIVITIES OF DAILY LIVING (ADL)
ADLS_ACUITY_SCORE: 37

## 2023-05-10 ASSESSMENT — LIFESTYLE VARIABLES: TOBACCO_USE: 1

## 2023-05-10 NOTE — ANESTHESIA PREPROCEDURE EVALUATION
Anesthesia Pre-Procedure Evaluation    Patient: Ivan Bell   MRN: [id number] : 1965        Procedure : Procedure(s):  CYSTOSCOPY, LEFT URETERAL STENT REMOVAL, LEFT RETROGRADE PYELOGRAM, LEFT URETEROSCOPY WITH LASER LITHOTRIOPSY AND BASKET REMOVAL OF STONES, LEFT URETERAL STENT PLACEMENT          Past Medical History:   Diagnosis Date     Alcoholic cirrhosis of liver (H)      Alcoholic hepatitis 2019     Chronic kidney disease     CRF     Depression      Gout      History of transfusion      Hypertension      Hypertriglyceridemia      Left calcaneus fracture 2006: Fell 10 feet from ladder onto left foot on frozen ground on 06 at home.  Immediate pain and unable to walk- seen at [place] and diagnosed with calcaneus fracture     Nephrolithiasis      Osteoarthritis      Portal vein thrombosis     left occlusion, partial main     Thrombocytopenia (H)       Past Surgical History:   Procedure Laterality Date     ANKLE SURGERY Left      ANKLE SURGERY       ARTHROSCOPY KNEE       COLONOSCOPY N/A 2016    Procedure: COLONOSCOPY;  Surgeon: Rhys Uriostegui MD;  Location: [facility]     COMBINED CYSTOSCOPY, RETROGRADES, URETEROSCOPY, LASER HOLMIUM LITHOTRIPSY URETER(S), INSERT STENT Bilateral 2022    Procedure: CYSTOSCOPY, RIGHT RETROGRADE PYELOGRAM, RIGHT URETEROSCOPY WITH LASER LITHOTRIPSY AND BASKET REMOVAL OF STONE, RIGHT URETERAL STENT PLACEMENT, LEFT RETROGRADE PYELOGRAM, LEFT URETEROSCOPY WITH LASER LITHOTRIPSY AND BASKET REMOVAL OF STONE, LEFT URETERAL STENT PLACEMENT;  Surgeon: Dylan Galaviz MD;  Location: [facility]     COMBINED CYSTOSCOPY, RETROGRADES, URETEROSCOPY, LASER HOLMIUM LITHOTRIPSY URETER(S), INSERT STENT Bilateral 2022    Procedure: CYSTOSCOPY, BILATERAL URETERAL STENT REMOVAL, BILATERAL  RETROGRADE PYELOGRAM, BILATERAL URETEROSCOPY. HOLMIUM LASER LITHOTRIPSY LEFT SIDE.  AND BASKET REMOVAL OF STONES BILATERAL, LEFT  URETERAL STENT PLACEMENT;   Surgeon: Dylan Galaviz MD;  Location:  OR     COMBINED CYSTOSCOPY, RETROGRADES, URETEROSCOPY, LASER HOLMIUM LITHOTRIPSY URETER(S), INSERT STENT Left 4/24/2023    Procedure: CYSTOSCOPY, LEFT RETROGRADE PYELOGRAM, LEFT URETEROSCOPY WITH LASER LITHOTRIOPSY AND BASKET REMOVAL OF STONES, LEFT URETERAL STENT PLACEMENT;  Surgeon: Dylan Galaviz MD;  Location:  OR     ESOPHAGOSCOPY, GASTROSCOPY, DUODENOSCOPY (EGD), COMBINED N/A 03/31/2016    Procedure: COMBINED ESOPHAGOSCOPY, GASTROSCOPY, DUODENOSCOPY (EGD);  Surgeon: Rhys Uriostegui MD;  Location:  GI     ESOPHAGOSCOPY, GASTROSCOPY, DUODENOSCOPY (EGD), COMBINED N/A 03/09/2018    Procedure: COMBINED ESOPHAGOSCOPY, GASTROSCOPY, DUODENOSCOPY (EGD), BIOPSY SINGLE OR MULTIPLE;  EGD;  Surgeon: Gonzalo Wahl MD;  Location:  GI     ESOPHAGOSCOPY, GASTROSCOPY, DUODENOSCOPY (EGD), COMBINED N/A 06/07/2019    Procedure: ESOPHAGOGASTRODUODENOSCOPY (EGD);  Surgeon: Gonzalo Wahl MD;  Location:  GI     FOOT SURGERY       IR PARACENTESIS  10/29/2019     IR PARACENTESIS  11/25/2020     IR PARACENTESIS  08/11/2021     IR PARACENTESIS  01/28/2022     IR PARACENTESIS  03/30/2022     IR TRANSVEN INTRAHEPATIC PORTOSYST REV  10/29/2019     IR TRANSVEN INTRAHEPATIC PORTOSYST REV  11/25/2020     IR TRANSVEN INTRAHEPATIC PORTOSYST REV  08/11/2021     IR TRANSVEN INTRAHEPATIC PORTOSYST REV  01/28/2022     IR TRANSVEN INTRAHEPATIC PORTOSYST REV  03/30/2022     KNEE SURGERY Left      KNEE SURGERY Right      RELEASE CARPAL TUNNEL       RELEASE TRIGGER FINGER Right 06/11/2020    Procedure: RELEASE, TRIGGER FINGER, right ring and long finger;  Surgeon: Pascual Valencia MD;  Location: [facility]     SIGMOIDOSCOPY FLEXIBLE N/A 10/31/2017    Procedure: SIGMOIDOSCOPY FLEXIBLE;;  Surgeon: Armaan Adams MD;  Location:  GI     TIPS Procedure  06/06/2018     TIPS PROCEDURE  11/01/2020     ZZC PLASTY KNEE,MED OR LAT COMPARTMT Right 02/19/2021    Procedure: RIGHT  UNICOMPARTMENTAL ARTHROPLASTY KNEE MEDIAL;  Surgeon: José Miguel Landaverde MD;  Location: [facility];  Service: Orthopedics      Allergies   Allergen Reactions     Trazodone Visual Disturbance     Oxycodone Other (See Comments)     Delirium and constipation  Delirium and constipation      Social History     Tobacco Use     Smoking status: Former     Packs/day: 0.00     Types: Dip, chew, snus or snuff, Cigarettes     Quit date: 1998     Years since quittin.7     Smokeless tobacco: Former     Types: Chew     Tobacco comments:     1 tin per 10 days.   Vaping Use     Vaping status: Never Used   Substance Use Topics     Alcohol use: No     Alcohol/week: 17.5 standard drinks of alcohol     Types: 21 Cans of beer per week     Comment: 2019      Wt Readings from Last 1 Encounters:   05/10/23 81 kg (178 lb 9.6 oz)        Anesthesia Evaluation            ROS/MED HX  ENT/Pulmonary:     (+) EVELYNE risk factors, tobacco use, Past use,  (-) sleep apnea   Neurologic:  - neg neurologic ROS     Cardiovascular:     (+) Dyslipidemia -----    METS/Exercise Tolerance:     Hematologic: Comments: thrombocytopenia      Musculoskeletal:  - neg musculoskeletal ROS     GI/Hepatic: Comment: Cirrhosis    S/p TIPS    (+) hepatitis type Alcoholic, liver disease,     Renal/Genitourinary:     (+) renal disease, type: CRI, Pt does not require dialysis, Nephrolithiasis ,     Endo:       Psychiatric/Substance Use: Comment: Sober x 7yrs    (+) alcohol abuse     Infectious Disease:       Malignancy:       Other:            Physical Exam    Airway  airway exam normal      Mallampati: II   TM distance: > 3 FB   Neck ROM: full   Mouth opening: > 3 cm    Respiratory Devices and Support         Dental       (+) Minor Abnormalities - some fillings, tiny chips      Cardiovascular   cardiovascular exam normal          Pulmonary   pulmonary exam normal                OUTSIDE LABS:  CBC:   Lab Results   Component Value Date    WBC 4.4 04/10/2023     WBC 4.0 10/12/2022    HGB 11.0 (L) 04/10/2023    HGB 11.1 (L) 10/12/2022    HCT 32.7 (L) 04/10/2023    HCT 34.4 (L) 10/12/2022    PLT 74 (L) 04/10/2023    PLT 78 (L) 10/12/2022     BMP:   Lab Results   Component Value Date     05/10/2023     04/24/2023    POTASSIUM 3.9 05/10/2023    POTASSIUM 4.0 04/24/2023    CHLORIDE 110 (H) 04/24/2023    CHLORIDE 110 (H) 04/10/2023    CO2 23 04/24/2023    CO2 24 04/10/2023    BUN 13.9 04/24/2023    BUN 21.1 04/10/2023    CR 1.48 (H) 05/10/2023    CR 1.38 (H) 04/24/2023    GLC 90 04/24/2023    GLC 95 04/10/2023     COAGS:   Lab Results   Component Value Date    PTT 41 (H) 02/02/2022    INR 1.47 (H) 04/10/2023    FIBR 181 (L) 03/10/2016     POC:   Lab Results   Component Value Date    BGM 77 06/06/2018     HEPATIC:   Lab Results   Component Value Date    ALBUMIN 2.9 (L) 04/10/2023    PROTTOTAL 5.6 (L) 04/10/2023    ALT <5 (L) 04/10/2023    AST 29 04/10/2023    GGT 3411 (H) 01/26/2004    ALKPHOS 124 04/10/2023    BILITOTAL 2.0 (H) 04/10/2023    LUISITO 62 (H) 06/04/2019     OTHER:   Lab Results   Component Value Date    LACT 1.4 11/01/2019    A1C 5.3 01/13/2008    JULISSA 9.3 04/24/2023    PHOS 2.5 08/30/2019    MAG 1.8 12/31/2019    LIPASE 327 02/02/2022    AMYLASE 66 01/26/2004    TSH 1.22 06/09/2018    CRP <2.9 05/27/2020    SED 9 05/27/2020       Anesthesia Plan    ASA Status:  3   NPO Status:  NPO Appropriate    Anesthesia Type: General.     - Airway: LMA   Induction: Intravenous.   Maintenance: Balanced.        Consents    Anesthesia Plan(s) and associated risks, benefits, and realistic alternatives discussed. Questions answered and patient/representative(s) expressed understanding.    - Discussed:     - Discussed with:  Patient         Postoperative Care    Pain management: IV analgesics, Oral pain medications.   PONV prophylaxis: Ondansetron (or other 5HT-3), Dexamethasone or Solumedrol, Background Propofol Infusion     Comments:                Eric Collins MD

## 2023-05-10 NOTE — OP NOTE
OPERATIVE REPORT  DATE OF SURGERY: 05/10/23  LOCATION OF SURGERY: [facility]  PREOPERATIVE DIAGNOSIS:  (N20.0) Kidney stone on left side  (primary encounter diagnosis)  (N20.1) Left ureteral stone  POSTOPERATIVE DIAGNOSIS: (N20.0) Kidney stone on left side  (primary encounter diagnosis)  (N20.1) Left ureteral stone     START TIME: 8:48 AM  END TIME: 10:38 AM    PROCEDURE PERFORMED:   1. Cystoscopy and LEFT ureteral stent removal  2. LEFT retrograde pyelogram  3. LEFT ureteroscopy with laser lithotripsy  4. LEFT ureteroscopy with basketing of stones - Modifier 22  5. LEFT JJ stent placement  6. >1hr physician fluoroscopy time      STAFF SURGEON: Dylan Galaviz MD  ANESTHESIA: General.   ESTIMATED BLOOD LOSS: 10 mL.   DRAINS AND TUBES: 6fr x 26 cm ureteral stent, 18fr coude catheter  COMPLICATIONS: None.   DISPOSITION: PACU.   SPECIMENS OBTAINED:   ID Type Source Tests Collected by Time Destination   A : LEFT KIDNEY, URETERAL STONE Calculus/Stone Kidney, Left STONE ANALYSIS Dylan Galaviz MD 5/10/2023 10:34 AM      SIGNIFICANT FINDINGS: Cystoscopy with no evidence of stone in the bladder.  Previously placed left ureteral stent removed.  Left ureteroscopy with evidence of a large stone in the proximal ureter which was fragmented and removed.  Left ureteroscopy with evidence of significant stone burden in the mid and lower poles with several stones and very challenging to access calyces.  Stones fragmented and basketed and removed.  Left ureteral stent placed.    MODIFIER 22 JUSTIFICATION: This is a very challenging ureteroscopy given the large stone burden as well as few of the stones which were in very challenging to access calyces requiring significant additional surgeon time and effort beyond that of a standard ureteroscopy.     HISTORY OF PRESENT ILLNESS: Ivan Bell is a 57 year old man with complicated medical history including alcoholic cirrhosis status post TI PS and paracentesis with baseline elevated  INR.  History of significant bilateral nephrolithiasis status post staged ureteroscopy in July 2022.  He developed return of left-sided flank pain gradually over the last several months and a repeat CT scan demonstrated significant stone burden in the left distal ureter with associated hydroureteronephrosis.  Also evidence for significant left kidney stone burden.  He was counseled on the need for staged ureteroscopy and underwent for stage on 4/24/2023 with clearance of his ureter and stent placement.  He returns today for of his kidney stone burden.    OPERATION PERFORMED:   Informed consent was obtained and the patient was brought to the operating room where general anesthesia was induced. The patient was given appropriate preoperative antibiotics and positioned supine. The patient was then repositioned in dorsal lithotomy with all pressure points padded. We then performed a timeout, verifying the correct patient's site and procedure to be performed.    A 22 Comoran cystoscope was inserted atraumatically into the bladder.  Cystoscopy was performed with no evidence of stones and withdrawn to the urethral meatus.  This was cannulated with a 0.035 sensor wire which was advanced up to the renal pelvis under fluoroscopic guidance and the stent was removed.  Semirigid ureteroscope was assembled and inserted atraumatically into the bladder.  Using Amplatz Super Stiff wire and a railroad technique the ureteroscope was able to advance up to the proximal ureter.  A rather large stone was identified in the proximal ureter which was basketed, however was too large to remove intact and so therefore was fragmented with holmium laser lithotripsy and all fragments were removed.  Following this retrograde pyelogram from no further evidence of stones in the ureter and the Amplatz Super Stiff wire was advanced up to the renal pelvis and the ureteroscope was removed.  A 13-15 Comoran by 46 cm ureteral access sheath was advanced over  the wire to the UPJ and the inner stylette and wire were removed.  Flexible ureteroscopy was then performed with evidence of significant stone burden in the mid and lower poles consistent with his CT scan.  A few of the stones were noted to be in narrow infundibulum and very adherent to the papilla.  Holmium laser lithotripsy was initiated and the stones were fragmented.  There was a large stone in the lower pole of the kidney which was very challenging to access and required additional surgeon time and effort beyond that of a standard case.  Stone fragments were basketed and removed.  Visualization was challenging given the difficult laser lithotripsy in the lower pole and all attainable and visible stone fragments were removed.  The flexible ureteroscope and access sheath were removed en bloc with visualization of the ureter with no evidence of ureteral injury though ongoing inflammation from the site of prior stone impaction.  Cystoscope was replaced into the bladder and a new 6 Upper sorbian by 26 cm JJ ureteral stent was advanced over the wire with good curl noted in the renal pelvis and in the bladder.  He received 20 mg IV Lasix and 15 mg IV Toradol.  The cystoscope was removed.  An 18 Upper sorbian coudé catheter was placed with 10 cc in the balloon.  He was emerged from anesthesia and taken to the recovery room in stable condition.      Dylan Galaviz MD   Urology  [facility]  Clinic Phone [phone]

## 2023-05-10 NOTE — DISCHARGE INSTRUCTIONS
POSTOPERATIVE INSTRUCTIONS    Diagnosis-------------------------------   LEFT kidney and ureteral stones    Procedure-------------------------------  Procedure(s) (LRB):  CYSTOSCOPY, LEFT URETERAL STENT REMOVAL, LEFT RETROGRADE PYELOGRAM, LEFT URETEROSCOPY WITH LASER LITHOTRIOPSY AND BASKET REMOVAL OF STONES, LEFT URETERAL STENT PLACEMENT (Left)      Findings--------------------------------  LEFT ureteral stone removed  Majority of LEFT kidney stones removed    Home-going instructions-----------------         Activity Limitation:     - No driving or operating heavy machinery while on narcotic pain medication.     FOLLOW THESE INSTRUCTIONS AS INDICATED BELOW:  - Observe operative area for signs of excessive bleeding.  - You may shower.  - Increase fluid intake to promote clear urine.  - Resume usual diet as tolerated    What to expect while recovering-----------  - You may experience some intermittent bleeding that makes your urine pink or cherry colored. This is normal.  - However, if you are unable to urinate, passing large amount of clots, have jennifer blood in your urine, or have a temperature >101 degrees, call the urology nurse on call, or present to your nearest emergency department.  - You are encouraged to walk daily, and have no activity restrictions.   - A URETERAL STENT has been placed that allows urine to flow unobstructed from your kidney into your bladder.  The stent has a curl in the kidney and a curl in the bladder.  The curl in the bladder can cause some urgency and frequency of urination as well as some mild blood in the urine.  The curl in the kidney can cause some mild flank discomfort.  This may be more noticeable when you urinate.  A URETERAL STENT is meant to be left in temporarily.  It must be removed or changed no later than 3 months after it's insertion.  If it's not removed it can result in stone overgrowth on the stent that can cause pain, infection, and can be very difficult to remove.       Discharge Medications/instructions:   - Flomax (tamsulosin) to be taken daily until stent is removed  - Oxybutynin 5mg XL (Ditropan XL) to be taken daily until ureteral stent is removed  - Take Tylenol 1000mg every 6 hours for pain  - Take Ibuprofen 600mg every 6 hours as needed for additional pain control  - Take Oxycodone 5mg every 4-6 hours only for break through pain  - Take Colace while taking Oxycodone to prevent constipation      Questions/concerns------------------------  [facility]: [phone]  [facility]: [phone]    Future appointments  You will be contacted to arrange stent removal next week.    Dylan Galaviz MD       Today you received Toradol, an antiinflammatory medication similar to Ibuprofen.  You should not take other antiinflammatory medication, such as Ibuprofen, Motrin, Advil, Aleve, Naprosyn, etc until 4:30p.      Same Day Surgery Discharge Instructions for  Sedation and General Anesthesia     It's not unusual to feel dizzy, light-headed or faint for up to 24 hours after surgery or while taking pain medication.  If you have these symptoms: sit for a few minutes before standing and have someone assist you when you get up to walk or use the bathroom.    You should rest and relax for the next 24 hours. We recommend you make arrangements to have an adult stay with you for at least 24 hours after your discharge.  Avoid hazardous and strenuous activity.    DO NOT DRIVE any vehicle or operate mechanical equipment for 24 hours following the end of your surgery.  Even though you may feel normal, your reactions may be affected by the medication you have received.    Do not drink alcoholic beverages for 24 hours following surgery.     Slowly progress to your regular diet as you feel able. It's not unusual to feel nauseated and/or vomit after receiving anesthesia.  If you develop these symptoms, drink clear liquids (apple juice, ginger ale, broth, 7-up, etc.  ) until you feel better.  If your nausea and vomiting persists for 24 hours, please notify your surgeon.      All narcotic pain medications, along with inactivity and anesthesia, can cause constipation. Drinking plenty of liquids and increasing fiber intake will help.    For any questions of a medical nature, call your surgeon.    Do not make important decisions for 24 hours.    If you had general anesthesia, you may have a sore throat for a couple of days related to the breathing tube used during surgery.  You may use Cepacol lozenges to help with this discomfort.  If it worsens or if you develop a fever, contact your surgeon.     If you feel your pain is not well managed with the pain medications prescribed by your surgeon, please contact your surgeon's office to let them know so they can address your concerns.

## 2023-05-10 NOTE — ANESTHESIA POSTPROCEDURE EVALUATION
Patient: Ivan Bell    Procedure: Procedure(s):  CYSTOSCOPY, LEFT URETERAL STENT REMOVAL, LEFT RETROGRADE PYELOGRAM, LEFT URETEROSCOPY WITH LASER LITHOTRIOPSY AND BASKET REMOVAL OF STONES, LEFT URETERAL STENT PLACEMENT       Anesthesia Type:  General    Note:  Disposition: Outpatient   Postop Pain Control: Uneventful            Sign Out: Well controlled pain   PONV: No   Neuro/Psych: Uneventful            Sign Out: Acceptable/Baseline neuro status   Airway/Respiratory: Uneventful            Sign Out: Acceptable/Baseline resp. status   CV/Hemodynamics: Uneventful            Sign Out: Acceptable CV status; No obvious hypovolemia; No obvious fluid overload   Other NRE: NONE   DID A NON-ROUTINE EVENT OCCUR?            Last vitals:  Vitals Value Taken Time   /74 05/10/23 1145   Temp 36.2  C (97.1  F) 05/10/23 1052   Pulse 74 05/10/23 1158   Resp 17 05/10/23 1158   SpO2 100 % 05/10/23 1158   Vitals shown include unvalidated device data.    Electronically Signed By: Dennys Wilson DO, DO  May 10, 2023  3:12 PM

## 2023-05-10 NOTE — ANESTHESIA PROCEDURE NOTES
Airway       Patient location during procedure: OR  Staff -        Performed By: CRNA  Consent for Airway        Urgency: elective  Indications and Patient Condition       Indications for airway management: tea-procedural       Induction type:intravenous       Mask difficulty assessment: 1 - vent by mask    Final Airway Details       Final airway type: supraglottic airway      Post intubation assessment        Placement verified by: capnometry, equal breath sounds and chest rise        Number of attempts at approach: 1       Secured with: commercial tube parker and pink tape       Ease of procedure: easy       Dentition: Intact and Unchanged

## 2023-05-10 NOTE — INTERVAL H&P NOTE
I have reviewed the surgical (or preoperative) H&P that is linked to this encounter, and examined the patient. There are no significant changes    Clinical Conditions Present on Arrival:  Clinically Significant Risk Factors Present on Admission                # Coagulation Defect: INR = 1.47 (Ref range: 0.85 - 1.15) and/or PTT = N/A, will monitor for bleeding

## 2023-05-10 NOTE — ANESTHESIA CARE TRANSFER NOTE
Patient: Ivan Bell    Procedure: Procedure(s):  CYSTOSCOPY, LEFT URETERAL STENT REMOVAL, LEFT RETROGRADE PYELOGRAM, LEFT URETEROSCOPY WITH LASER LITHOTRIOPSY AND BASKET REMOVAL OF STONES, LEFT URETERAL STENT PLACEMENT       Diagnosis: Left ureteral stone [N20.1]  Kidney stone on left side [N20.0]  Diagnosis Additional Information: No value filed.    Anesthesia Type:   General     Note:    Oropharynx: oropharynx clear of all foreign objects and spontaneously breathing  Level of Consciousness: awake  Oxygen Supplementation: face mask  Level of Supplemental Oxygen (L/min / FiO2): 8  Independent Airway: airway patency satisfactory and stable  Dentition: dentition unchanged  Vital Signs Stable: post-procedure vital signs reviewed and stable  Report to RN Given: handoff report given  Patient transferred to: PACU  Comments: Pt exhibits spontaneous respirations, follows commands, suctioned, LMA removed, exchanging well, transferred to pacu with O2 @ 10L via mask, all monitors and alarms on, report to RN, VSS.  Handoff Report: Identifed the Patient, Identified the Reponsible Provider, Reviewed the pertinent medical history, Discussed the surgical course, Reviewed Intra-OP anesthesia mangement and issues during anesthesia, Set expectations for post-procedure period and Allowed opportunity for questions and acknowledgement of understanding      Vitals:  Vitals Value Taken Time   /76 05/10/23 1052   Temp 36.2  C (97.1  F) 05/10/23 1052   Pulse 68 05/10/23 1052   Resp 12 05/10/23 1052   SpO2 100 % 05/10/23 1052   Vitals shown include unvalidated device data.    Electronically Signed By: GILLIAN Aponte CRNA  May 10, 2023  10:53 AM

## 2023-05-13 LAB
APPEARANCE STONE: NORMAL
COMPN STONE: NORMAL
SPECIMEN WT: 349 MG

## 2023-05-14 NOTE — TELEPHONE ENCOUNTER
[facility] Call Center    Phone Message    May a detailed message be left on voicemail: yes    Reason for Call: Other: Courtney RN from [facility] [place], Ph # [phone] - requests call back from Dr Bales's Nurse to discuss Pt treatment plan and status - Please return her call - Thanks!      Action Taken: Message routed to:  [facility] Hepatology Clinic   50

## 2023-05-15 ENCOUNTER — RADIOLOGY INJECTION OFFICE VISIT (OUTPATIENT)
Dept: PHYSICAL MEDICINE AND REHAB | Facility: CLINIC | Age: 58
End: 2023-05-15
Attending: NURSE PRACTITIONER
Payer: COMMERCIAL

## 2023-05-15 ENCOUNTER — TELEPHONE (OUTPATIENT)
Dept: PHYSICAL MEDICINE AND REHAB | Facility: CLINIC | Age: 58
End: 2023-05-15

## 2023-05-15 VITALS
SYSTOLIC BLOOD PRESSURE: 142 MMHG | TEMPERATURE: 98.2 F | OXYGEN SATURATION: 98 % | HEART RATE: 97 BPM | DIASTOLIC BLOOD PRESSURE: 62 MMHG

## 2023-05-15 DIAGNOSIS — M54.42 CHRONIC BILATERAL LOW BACK PAIN WITH BILATERAL SCIATICA: ICD-10-CM

## 2023-05-15 DIAGNOSIS — G89.29 CHRONIC BILATERAL LOW BACK PAIN WITH BILATERAL SCIATICA: ICD-10-CM

## 2023-05-15 DIAGNOSIS — M54.41 CHRONIC BILATERAL LOW BACK PAIN WITH BILATERAL SCIATICA: ICD-10-CM

## 2023-05-15 DIAGNOSIS — M54.16 LUMBAR RADICULITIS: ICD-10-CM

## 2023-05-15 PROCEDURE — 64483 NJX AA&/STRD TFRM EPI L/S 1: CPT | Mod: 50 | Performed by: PAIN MEDICINE

## 2023-05-15 RX ORDER — DEXAMETHASONE SODIUM PHOSPHATE 10 MG/ML
INJECTION, SOLUTION INTRAMUSCULAR; INTRAVENOUS
Status: COMPLETED | OUTPATIENT
Start: 2023-05-15 | End: 2023-05-15

## 2023-05-15 RX ORDER — LIDOCAINE HYDROCHLORIDE 10 MG/ML
INJECTION, SOLUTION EPIDURAL; INFILTRATION; INTRACAUDAL; PERINEURAL
Status: COMPLETED | OUTPATIENT
Start: 2023-05-15 | End: 2023-05-15

## 2023-05-15 RX ADMIN — DEXAMETHASONE SODIUM PHOSPHATE 20 MG: 10 INJECTION, SOLUTION INTRAMUSCULAR; INTRAVENOUS at 14:35

## 2023-05-15 RX ADMIN — LIDOCAINE HYDROCHLORIDE 4 ML: 10 INJECTION, SOLUTION EPIDURAL; INFILTRATION; INTRACAUDAL; PERINEURAL at 14:34

## 2023-05-15 ASSESSMENT — PAIN SCALES - GENERAL
PAINLEVEL: SEVERE PAIN (6)
PAINLEVEL: MODERATE PAIN (4)

## 2023-05-15 NOTE — PATIENT INSTRUCTIONS
Follow-up visit in 2-4 weeks with Nina Montoya CNP to discuss injection outcome and determine care plan going forward.     DISCHARGE INSTRUCTIONS    During office hours (8:00 a.m.- 4:00 p.m.) questions or concerns may be answered  by calling [facility] Navigation Nurses at  [phone].  Messages received after hours will be returned the following business day.      In the case of an emergency, please dial 911 or seek assistance at the nearest Emergency Room/Urgent Care facility.     All Patients:    You may experience an increase in your symptoms for the first 2 days (It may take anywhere between 2 days- 2 weeks for the steroid to have maximum effect).    You may use ice on the injection site, as frequently as 20 minutes each hour if needed.    You may take your pain medicine.    You may continue taking your regular medication after your injection. If you have had a Medial Branch Block you may resume pain medication once your pain diary is completed.    You may shower. No swimming, tub bath or hot tub for 48 hours.  You may remove your bandaid/bandage as soon as you are home.    You may resume light activities, as tolerated.    Resume your usual diet as tolerated.    It is strongly advised that you do not drive for 1-3 hours post injection.    If you have had oral sedation:  Do not drive for 8 hours post injection.      If you have had IV sedation:  Do not drive for 24 hours post injection.  Do not operate hazardous machinery or make important personal/business decisions for 24 hours.      POSSIBLE STEROID SIDE EFFECTS (If steroid/cortisone was used for your procedure)    -If you experience these symptoms, it should only last for a short period    Swelling of the legs              Skin redness (flushing)     Mouth (oral) irritation   Blood sugar (glucose) levels            Sweats                    Mood changes  Headache  Sleeplessness  Weakened immune system for up to 14 days, which could increase the risk  of nicolasa the COVID-19 virus and/or experiencing more severe symptoms of the disease, if exposed.  Decreased effectiveness of the flu vaccine if given within 2 weeks of the steroid.         POSSIBLE PROCEDURE SIDE EFFECTS  -Call the Spine Center if you are concerned  Increased Pain           Increased numbness/tingling      Nausea/Vomiting          Bruising/bleeding at site      Redness or swelling                                              Difficulty walking      Weakness           Fever greater than 100.5    *In the event of a severe headache after an epidural steroid injection that is relieved by lying down, please call the [facility] to speak with a clinical staff member*

## 2023-05-15 NOTE — TELEPHONE ENCOUNTER
[facility] Call Center    Phone Message    May a detailed message be left on voicemail: yes     Reason for Call: Other: Pt would like to come in earlier today . Said he has doubled booked appts      Action Taken: Other: thang spine    Travel Screening: Not Applicable

## 2023-05-15 NOTE — TELEPHONE ENCOUNTER
Called patient to inform him there are no open appointments available to get him in sooner today. Left detailed message with this information. He is asked to call  if he needs to reschedule.

## 2023-05-16 ENCOUNTER — TELEPHONE (OUTPATIENT)
Dept: NURSING | Facility: CLINIC | Age: 58
End: 2023-05-16
Payer: COMMERCIAL

## 2023-05-16 ENCOUNTER — TELEPHONE (OUTPATIENT)
Dept: UROLOGY | Facility: CLINIC | Age: 58
End: 2023-05-16

## 2023-05-16 ENCOUNTER — OFFICE VISIT (OUTPATIENT)
Dept: UROLOGY | Facility: CLINIC | Age: 58
End: 2023-05-16
Payer: COMMERCIAL

## 2023-05-16 DIAGNOSIS — N20.1 LEFT URETERAL STONE: Primary | ICD-10-CM

## 2023-05-16 PROCEDURE — 52310 CYSTOSCOPY AND TREATMENT: CPT | Performed by: UROLOGY

## 2023-05-16 RX ORDER — CIPROFLOXACIN 500 MG/1
500 TABLET, FILM COATED ORAL ONCE
Status: COMPLETED | OUTPATIENT
Start: 2023-05-16 | End: 2023-05-16

## 2023-05-16 RX ADMIN — CIPROFLOXACIN 500 MG: 500 TABLET, FILM COATED ORAL at 11:39

## 2023-05-16 NOTE — TELEPHONE ENCOUNTER
Received update from [facility] team stating that patient had contacted them today about getting his stent removed with Dr. Galaviz today as he is leaving out of town.    Attempted to reach patient by phone, but no answer. Left voicemail to return call to clinic as soon as possible. When patient returns call, will discuss when patient can make it here to clinic. Patient is currently scheduled for cysto stent removal on 5/18/23.     Lory Hoang RN, BSN

## 2023-05-16 NOTE — TELEPHONE ENCOUNTER
Patient calling to change appointment for stent removal due to a work conflict. Advised patient call back when urology clinic opens.    Cheryl Shaw RN  05/16/23 7:10 AM  [facility] Nurse Advisor

## 2023-05-16 NOTE — PROGRESS NOTES
CYSTOSCOPY AND URETERAL STENT REMOVAL PROCEDURE NOTE:    Ivan Bell is a 57 year old male  who presents with ureteral stent  for cystoscopy and ureteral stent removal.    Pt ID verified with patient: Yes     Procedure verified with patient: Yes     Procedure confirmed with physician and support staff: Yes     Consent form confirmed with physician and support staff.    Sign In  History and Physical Exam reviewed .  Informed Consent Discussed: Yes   Sign in Communication: Yes   Time Out:  Team Confirms the Correct Patient, Correct Procedure; Yes , Correct Site and Site Marking, Correct Position (if applicable).    Affirmation of Time Out: Yes   Sign Out:  Sign Out Discussion: Yes     Ivan Bell is a 57 year old male with an indwelling ureteral stent in need of removal.    CYSTOSCOPY PROCEDURE:  After sterile preparation and draping of the patient,  a 17-E[place]ian flexible cystoscope was introduced via the urethra.  It was passed without difficulty into the bladder.  The urethra was open without evidence of stricture.  The ureteral orifices were orthotopic.  The double J stent was seen coming out the left side.  It was grasped with an alligator forceps and extracted intact without difficulty.  The patient tolerated the procedure well    A/P Successful stent removal  Prophylactic antibiotic ordered   Stone prevention counseling provided today    Watch for any new onset fevers, signs of UTI.  May expect some pain after removal.  If this is severe, or last many hours, you may need to return for replacement of stent.    Dylan Galaviz MD   Urology  [facility]

## 2023-05-16 NOTE — NURSING NOTE
One time dose of ciprofloxacin 500mg po for one dose ordered per Dr. Galaviz for cysto stent removal.    Lory Hoang RN, BSN

## 2023-05-16 NOTE — TELEPHONE ENCOUNTER
Called and spoke to patient. Cysto stent removal rescheduled to today at 11:45am at [facility].    Lory Hoang RN, BSN

## 2023-05-16 NOTE — NURSING NOTE
Ivan Bell's goals for this visit include:   Chief Complaint   Patient presents with     Cystoscopy     Stent removal        He requests these members of his care team be copied on today's visit information:       PCP: [facility] [place]    Referring Provider:  No referring provider defined for this encounter.    There were no vitals taken for this visit.    Do you need any medication refills at today's visit?     Betty Acosta LPN on 5/16/2023 at 11:38 AM

## 2023-05-17 NOTE — CONSULTS
DGTF/Event Note [facility]  Consult Note - Hospitalist Service, Gold 6  Date of Admission:  6/4/2019  Consult Requested by: Marilee Claudio  Reason for Consult: diagnosis paracentesis           US abdomen for fluid determination           I was asked to perform a focused abdominal ultrasound exclusively to determine whether paracentesis could be performed, and so evaluated areas most safe for paracentesis, and in which fluid reasonably likely to be present.   Interpretation of this exam is that insufficient fluid determined to be present in safe locations; team notified.  Interventional radiology referral can be considered if indicated.   Images saved in PACS.   Full report below:  Indication: ascites (suspected)  Views:   LUQ, LLQ, RUQ and RLQ  Findings:   Only small pockets of free fluid visualized.   Interpretation: see above. I do not feel it would be safe for me to tap and risk outweighs  benefit in this patient stable (no hypo or hyperthermia without abdominal pain and normal WBC). If you want to pursue, please contact IR. Discussed with patient my rationale for not attempting.     Thank you    Quinton Paulino MD  [facility]  Pager: [phone]

## 2023-05-19 DIAGNOSIS — K70.31 ALCOHOLIC CIRRHOSIS OF LIVER WITH ASCITES (H): ICD-10-CM

## 2023-05-19 RX ORDER — EPLERENONE 50 MG/1
100 TABLET, FILM COATED ORAL 2 TIMES DAILY
Qty: 360 TABLET | Refills: 3 | Status: ON HOLD | OUTPATIENT
Start: 2023-05-19 | End: 2023-09-19

## 2023-06-20 ENCOUNTER — VIRTUAL VISIT (OUTPATIENT)
Dept: NEPHROLOGY | Facility: CLINIC | Age: 58
End: 2023-06-20
Payer: COMMERCIAL

## 2023-06-20 DIAGNOSIS — R39.15 URINARY URGENCY: ICD-10-CM

## 2023-06-20 DIAGNOSIS — R79.89 ELEVATED SERUM CREATININE: ICD-10-CM

## 2023-06-20 DIAGNOSIS — N20.0 RECURRENT KIDNEY STONES: Primary | ICD-10-CM

## 2023-06-20 PROCEDURE — 99214 OFFICE O/P EST MOD 30 MIN: CPT | Mod: 93 | Performed by: INTERNAL MEDICINE

## 2023-06-20 NOTE — PROGRESS NOTES
Virtual Visit Details    Type of service:  Telephone Visit   Phone call duration: 13 minutes   Start 1:34 PM   End 1:47 PM     [facility]    Alma Moses MD  2023     Name: Ivan Bell  MRN: [id number]  : 1965  Referring provider: Dylan Galaviz MD     Assessment and Plan:  Ivan Bell is a 57 year old with alcohol related cirrhosis, ascites, HE and recurrent kidney stones.     # Nephrolithiasis - stone type- primarily calcium oxalate - 2022, 2022 stone analysis, again calcium oxalate 5/10/23 and 23  Fluid intake limited given cirrhosis and ascites    # elevated creatinine - currently running 1.4-1.7 mg/dL (last checked 5/10/23) in setting of hydronephrosis, previously baseline was 1.2-1.3 mg/dL  Recheck renal panel, UA and urine protein    # urinary urgency/incontinence - UA, urine culture, should follow up with urology    # history AMBER - creatinine 1.47 with eGFR 55 on 10/12/2022  - creatinine and GFR fluctuate (hemodynamic), creatinine often around 1.3 mg/dL and eGFR often close to 65     # alcohol related cirrhosis with complications of history of variceal bleed 2017, ascites, mild portal hypertensive gastropathy and hepatic encephalopathy  - last alcohol intake was 2019 per Dr. Bales's note  - last visit with Dr. Bales 4/10/232 and MELD-Na was 19 at that time and this was an increase related to higher creatinine  - diuretics furosemide 40 mg am and 20 mg pm, has been off eplerenone  - lactulose and rifaximin     24 hour urine 2023  CT scan/imaging summer 2023  Return in about 5 months (around 2023).     Alma Moses MD  [facility]  Department of Medicine  Division of Nephrology and Hypertension  [facility]  edilma Johnson Web Console     Reason For Visit:   Nephrolithiasis.     HPI:   Ivan Bell is a 57 year old  with PMHx of cirrhosis due to alcohol use, ascites, HE, TIPS in 2018 and  "revisions 2019, 2020, 2021 and Jan 2022 and kidney stones.  Has passed many stones over the years.     April 2022, Ivan was evaluated by Dr. Dylan Galaviz for significant bilateral kidney stone burden. There was significant stone burden on left and staged procedure planned.    7/1/2022 he had bilateral URS and laser lithotripsy with basket stone removal  7/27/2022 - bilateral URS and laser lithotripsy on left  All accessible stone fragments removed  Noted to have several calyces with evidence of stones which were deeper in the parenchyma  Ultrasound for TIPS on 8/22/22 showed bilateral nephrolithiasis  4/24/23 and 5/10/23 - staged stone removal on left with URS, laser lithotripsy and basket removal of stones      Last visit 12/13/2022 at which time we reviewed kidney stone history as well as cirrhosis with complication of ascites s/p TIPS revision Jan 2022. Also reviewed how presence of cirrhosis impacts kidney stone prevention as Ivan has been on loop diuretic and there is need to avoid citrate given risk of citrate toxicity with cirrhosis.  Litholink order was sent in January 2023 after our last visit.    Ivan was incidentally found to have new left sided hydronephrosis in April 2023 (routine ultrasound for HCC screening) so a CT scan was arranged - this showed multiple obstructing stones in left ureter. Ultimately Ivan underwent staged ureteroscopy with left URS, laser lithotripsy and basket removal of stones on 4/24/23 and 5/10/23 with Dr. Galaviz.  Was not having severe symptoms of kidney stones when that ultrasound was done but did have some symptoms in retrospect. \"didn't put me down like they did a few years ago\".  Doing OK now but has a significant urinary urgency and sometimes cannot make it to the bathroom. Once in awhile gets pain genital area that feels like it might be kidney stone but has not passed anything. No hematuria. No fever or chills, no N/V.  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  Fluid retention related to cirrhosis continues to be controlled since TIPS.    Review of Systems:   Pertinent items are noted in HPI or as below, remainder of complete ROS is negative.      Active Medications:   Current Outpatient Medications   Medication Sig Dispense Refill     acetaminophen (TYLENOL) 500 MG tablet Take 1,000 mg by mouth every 6 hours as needed for mild pain        Ascorbic Acid (VITAMIN C PO) Take by mouth daily       docusate sodium (COLACE) 100 MG tablet Take 1 tablet (100 mg) by mouth daily 60 tablet 1     docusate sodium (COLACE) 100 MG tablet Take 1 tablet (100 mg) by mouth daily 60 tablet 1     eplerenone (INSPRA) 50 MG tablet Take 2 tablets (100 mg) by mouth 2 times daily Patient taking as ordered as of 8/16/22. 360 tablet 3     fexofenadine (ALLEGRA) 60 MG tablet Take 1 tablet (60 mg) by mouth daily 30 tablet 1     furosemide (LASIX) 20 MG tablet Take 1 tablet (20 mg) by mouth every evening 90 tablet 3     furosemide (LASIX) 40 MG tablet Take 1 tablet (40 mg) by mouth every morning 90 tablet 3     hydrOXYzine (VISTARIL) 50 MG capsule Take 1 capsule (50 mg) by mouth at bedtime as needed, may repeat once (For anxiety and difficulty sleeping) 60 capsule 2     lactulose (CHRONULAC) 10 GM/15ML solution TAKE 30MLS BY MOUTH THREE TIMES DAILY AS NEEDED FOR CONSTIPATION (TAKE AS NEEDED TO MAINTAIN 3 TO 4 BOWEL MOVEMENTS DAILY) 2000 mL 11     Menaquinone-7 (VITAMIN K2) 100 MCG CAPS Take 200 mcg by mouth daily        MULTIPLE VITAMINS PO Take 1 tablet by mouth daily       oxybutynin ER (DITROPAN XL) 5 MG 24 hr tablet Take 1 tablet (5 mg) by mouth daily Take daily until your stent is removed. 30 tablet 0     potassium chloride ER (KLOR-CON M) 20 MEQ CR tablet Take 2 tablets (40 mEq) by mouth daily 120 tablet 2     rifaximin (XIFAXAN) 550 MG TABS tablet Take 1  tablet (550 mg) by mouth 2 times daily 180 tablet 3     sertraline (ZOLOFT) 25 MG tablet Take 1 tablet (25 mg) by mouth daily 90 tablet 3     sildenafil (REVATIO) 20 MG tablet Take 3-5 tabs 1/2-4 hours to relations 30 tablet 1     sodium bicarbonate 650 MG tablet Take 1 tablet (650 mg) by mouth 2 times daily 180 tablet 3     tamsulosin (FLOMAX) 0.4 MG capsule Take 1 capsule (0.4 mg) by mouth daily 30 capsule 0     traZODone (DESYREL) 50 MG tablet Take 1-2 tablets ( mg) by mouth nightly as needed for sleep 60 tablet 2     vitamin D3 (CHOLECALCIFEROL) 2000 units (50 mcg) tablet Take 1 tablet by mouth daily           Allergies:   Prednisone; Trazodone; Benadryl [diphenhydramine]; and Oxycodone      Problem list:     Patient Active Problem List   Diagnosis     Hypertriglyceridemia     Gouty arthropathy     Erectile dysfunction     CARDIOVASCULAR SCREENING; LDL GOAL LESS THAN 130     Hypertension goal BP (blood pressure) < 140/90     Alcoholic cirrhosis of liver with ascites (H)     Nephrolithiasis     CKD (chronic kidney disease) stage 2, GFR 60-89 ml/min     Seasonal allergic rhinitis, unspecified trigger     Thrombocytopenia (H)     Cervicalgia     Alcohol use disorder, moderate, in sustained remission (H)      Past Surgical History:  Past Surgical History:   Procedure Laterality Date     ANKLE SURGERY Left      ANKLE SURGERY       ARTHROSCOPY KNEE       COLONOSCOPY N/A 03/31/2016    Procedure: COLONOSCOPY;  Surgeon: Rhys Uriostegui MD;  Location: [facility]     COMBINED CYSTOSCOPY, RETROGRADES, URETEROSCOPY, LASER HOLMIUM LITHOTRIPSY URETER(S), INSERT STENT Bilateral 7/1/2022    Procedure: CYSTOSCOPY, RIGHT RETROGRADE PYELOGRAM, RIGHT URETEROSCOPY WITH LASER LITHOTRIPSY AND BASKET REMOVAL OF STONE, RIGHT URETERAL STENT PLACEMENT, LEFT RETROGRADE PYELOGRAM, LEFT URETEROSCOPY WITH LASER LITHOTRIPSY AND BASKET REMOVAL OF STONE, LEFT URETERAL STENT PLACEMENT;  Surgeon: Dylan Galaviz MD;  Location: [facility]      COMBINED CYSTOSCOPY, RETROGRADES, URETEROSCOPY, LASER HOLMIUM LITHOTRIPSY URETER(S), INSERT STENT Bilateral 7/27/2022    Procedure: CYSTOSCOPY, BILATERAL URETERAL STENT REMOVAL, BILATERAL  RETROGRADE PYELOGRAM, BILATERAL URETEROSCOPY. HOLMIUM LASER LITHOTRIPSY LEFT SIDE.  AND BASKET REMOVAL OF STONES BILATERAL, LEFT  URETERAL STENT PLACEMENT;  Surgeon: Dylan Galaviz MD;  Location:  OR     COMBINED CYSTOSCOPY, RETROGRADES, URETEROSCOPY, LASER HOLMIUM LITHOTRIPSY URETER(S), INSERT STENT Left 4/24/2023    Procedure: CYSTOSCOPY, LEFT RETROGRADE PYELOGRAM, LEFT URETEROSCOPY WITH LASER LITHOTRIOPSY AND BASKET REMOVAL OF STONES, LEFT URETERAL STENT PLACEMENT;  Surgeon: Dylan Galaviz MD;  Location:  OR     COMBINED CYSTOSCOPY, RETROGRADES, URETEROSCOPY, LASER HOLMIUM LITHOTRIPSY URETER(S), INSERT STENT Left 5/10/2023    Procedure: CYSTOSCOPY, LEFT URETERAL STENT REMOVAL, LEFT RETROGRADE PYELOGRAM, LEFT URETEROSCOPY WITH LASER LITHOTRIOPSY AND BASKET REMOVAL OF STONES, LEFT URETERAL STENT PLACEMENT;  Surgeon: Dylan Galaviz MD;  Location:  OR     ESOPHAGOSCOPY, GASTROSCOPY, DUODENOSCOPY (EGD), COMBINED N/A 03/31/2016    Procedure: COMBINED ESOPHAGOSCOPY, GASTROSCOPY, DUODENOSCOPY (EGD);  Surgeon: Rhys Uriostegui MD;  Location:  GI     ESOPHAGOSCOPY, GASTROSCOPY, DUODENOSCOPY (EGD), COMBINED N/A 03/09/2018    Procedure: COMBINED ESOPHAGOSCOPY, GASTROSCOPY, DUODENOSCOPY (EGD), BIOPSY SINGLE OR MULTIPLE;  EGD;  Surgeon: Gonzalo Wahl MD;  Location:  GI     ESOPHAGOSCOPY, GASTROSCOPY, DUODENOSCOPY (EGD), COMBINED N/A 06/07/2019    Procedure: ESOPHAGOGASTRODUODENOSCOPY (EGD);  Surgeon: Gonzalo Wahl MD;  Location:  GI     FOOT SURGERY       IR PARACENTESIS  10/29/2019     IR PARACENTESIS  11/25/2020     IR PARACENTESIS  08/11/2021     IR PARACENTESIS  01/28/2022     IR PARACENTESIS  03/30/2022     IR TRANSVEN INTRAHEPATIC PORTOSYST REV  10/29/2019     IR TRANSVEN INTRAHEPATIC  PORTOSYST REV  11/25/2020     IR TRANSVEN INTRAHEPATIC PORTOSYST REV  08/11/2021     IR TRANSVEN INTRAHEPATIC PORTOSYST REV  01/28/2022     IR TRANSVEN INTRAHEPATIC PORTOSYST REV  03/30/2022     KNEE SURGERY Left      KNEE SURGERY Right      RELEASE CARPAL TUNNEL       RELEASE TRIGGER FINGER Right 06/11/2020    Procedure: RELEASE, TRIGGER FINGER, right ring and long finger;  Surgeon: Pascual Valencia MD;  Location: [facility]     SIGMOIDOSCOPY FLEXIBLE N/A 10/31/2017    Procedure: SIGMOIDOSCOPY FLEXIBLE;;  Surgeon: Armaan Adams MD;  Location: [facility]     TIPS Procedure  06/06/2018     TIPS PROCEDURE  11/01/2020     ZZC PLASTY KNEE,MED OR LAT COMPARTMT Right 02/19/2021    Procedure: RIGHT UNICOMPARTMENTAL ARTHROPLASTY KNEE MEDIAL;  Surgeon: José Miguel Landaverde MD;  Location: [facility];  Service: Orthopedics        Family History:   Family history is positive for kidney stones in 2 of his children and his father.      Social History:   Former smoker  Hx of alcohol abuse   in winter    Physical Exam:There were no vitals taken for this visit.  GENERAL APPEARANCE: alert and no distress  Pulmonary: normal work of breathing  NEURO: mentation intact and speech normal     Laboratory:  CMP:  Recent Labs   Lab Test 05/10/23  0706 04/24/23  1222 04/10/23  0656 10/12/22  0712 08/22/22  1148 07/01/22  1016 06/13/22  1042 08/11/21  0856 06/23/21  1805 03/26/21  1422 02/20/21  0621 02/19/21  1029 12/31/19  1612 12/31/19  1516 10/07/19  0740 08/30/19  0819 08/23/19  1131 08/02/19  0708 06/14/19  0800 06/07/19  0644 06/06/19  1749 06/06/19  0609 06/05/19  1655    140 141 143 138   < > 146*   < > 148* 145* 138 139   < >  --    < > 142   < > 138   < > 144  --  143 142   POTASSIUM 3.9 4.0 4.2 3.7 3.9   < > 4.2   < > 4.0 4.5 4.2 4.1   < >  --    < > 3.2*   < > 4.3   < > 3.4   < > 3.3* 3.4   CHLORIDE  --  110* 110* 111* 116*   < > 110*   < > 117* 114* 110* 111*   < >  --    < > 116*   < > 110*   < > 120*  --   120* 118*   CO2  --  23 24 25 23   < > 29   < > 25 29 24 20*   < >  --    < > 19*   < > 22   < > 15*  --  14* 16*   ANIONGAP  --  7 7 7 <1*   < > 7   < > 6 2* 4* 8   < >  --    < > 7   < > 6   < > 9  --  9 7   GLC  --  90 95 82 93   < > 91   < > 77 144* 157* 101   < >  --    < > 103*   < > 89   < > 93  --  81 197*   BUN  --  13.9 21.1 14.9 12   < > 14   < > 8 12 15 15   < >  --    < > 10   < > 11   < > 9  --  7 8   CR 1.48* 1.38* 1.70* 1.47* 1.20   < > 1.34*   < > 1.09 1.17 1.05 1.07   < >  --    < > 1.04   < > 1.18   < > 1.18  --  1.27* 1.19   GFRESTIMATED 54* 59* 46* 55* 70   < > 61   < > 75 69 >60 >60   < >  --    < > 80   < > 69   < > 69  --  63 68   GFRESTBLACK  --   --   --   --   --   --   --   --  86 79 >60 >60   < >  --    < > >90   < > 80   < > 80  --  73 79   JULISSA  --  9.3 9.5 9.0 8.2*   < > 9.2   < > 8.5 9.0 8.0* 8.4*   < >  --    < > 8.3*   < > 9.0   < > 9.1  --  8.8 8.7   MAG  --   --   --   --   --   --   --   --   --   --   --   --   --  1.8  --   --   --   --   --  2.0  --  1.8 1.8   PHOS  --   --   --   --   --   --   --   --   --   --   --   --   --   --   --  2.5  --  2.8  --  2.1*  --  2.2* 2.0*   PROTTOTAL  --   --  5.6* 5.6* 5.5*  --  6.0*   < > 5.6* 5.7* 5.3* 5.5*   < >  --    < >  --    < >  --    < > 5.7*  --  5.3*  --    ALBUMIN  --   --  2.9* 2.9* 2.2*  --  2.4*   < > 2.7* 2.7* 2.5* 2.7*   < >  --    < > 2.5*   < > 2.8*   < > 3.5  --  3.4  --    BILITOTAL  --   --  2.0* 1.6* 2.2*  --  2.1*   < > 3.6* 2.5* 2.6* 2.9*   < >  --    < >  --    < >  --    < > 8.0*  --  6.8*  --    ALKPHOS  --   --  124 152* 145  --  178*   < > 165* 147 88 111   < >  --    < >  --    < >  --    < > 127  --  111  --    AST  --   --  29 34 32  --  37   < > 33 28 36 40   < >  --    < >  --    < >  --    < > 37  --  38  --    ALT  --   --  <5* 7* 19  --  26   < > 19 19 15 15   < >  --    < >  --    < >  --    < > 14  --  14  --     < > = values in this interval not displayed.       CBC:  Recent Labs   Lab Test  04/10/23  0656 10/12/22  0712 08/22/22  1148 06/13/22  1042   HGB 11.0* 11.1* 10.0* 12.6*   WBC 4.4 4.0 4.2 4.3   RBC 3.48* 3.86* 3.44* 4.14*   HCT 32.7* 34.4* 31.3* 38.9*   MCV 94 89 91 94   MCH 31.6 28.8 29.1 30.4   MCHC 33.6 32.3 31.9 32.4   RDW 15.5* 18.7* 16.5* 16.8*   PLT 74* 78* 83* 83*       INR:  Recent Labs   Lab Test 04/10/23  0656 10/12/22  0712 08/22/22  1148 06/13/22  1042 03/07/22  0746 02/02/22  1520 01/28/22  0731 09/27/21  1207 08/11/21  0856 11/01/19  2013 10/29/19  1155   INR 1.47* 1.63* 1.69* 1.65*   < > 1.65* 1.57*   < > 1.67*   < > 2.06*   PTT  --   --   --   --   --  41* 40*  --  36  --  44*    < > = values in this interval not displayed.       ABG:  Recent Labs   Lab Test 03/14/19  0602   O2PER 21.0        URINE STUDIES:  Recent Labs   Lab Test 04/18/23  1016 02/03/22  0818 11/01/19  1730 06/06/19  1215   COLOR Yellow Yellow Light Red Yellow   APPEARANCE Clear Clear Slightly Cloudy Clear   URINEGLC Negative Negative Negative Negative   URINEBILI Negative Negative Negative Negative   URINEKETONE Negative Negative Negative Negative   SG 1.012 1.015 1.015 1.007   UBLD Large* Small* Large* Moderate*   URINEPH 6.0 6.5 7.0 6.5   PROTEIN 20* Negative 30* Negative   UROBILINOGEN  --  1.0  --   --    NITRITE Negative Negative Negative Negative   LEUKEST Large* Moderate* Large* Moderate*   RBCU * 5-10* >182* 68*   WBCU * * 63* 19*     Recent Labs   Lab Test 01/15/20  0716   UTPG 0.24*       PTH:   Recent Labs   Lab Test 08/02/19  0708   PTHI <7*       IRON STUDIES:   Recent Labs   Lab Test 08/02/19  0708 02/25/16  1630   IRON 113 51   * 180*   IRONSAT 47* 28   JOAQUIN 123 1,306*     Imaging:  Reviewed    13 minutes on telephone  Total time 40 minutes with chart review, documentation and coord of care.

## 2023-06-20 NOTE — NURSING NOTE
Is the patient currently in the state of [place]? YES    Visit mode:TELEPHONE    If the visit is dropped, the patient can be reconnected by: TELEPHONE VISIT: Phone number: [phone]    Will anyone else be joining the visit? NO      How would you like to obtain your AVS? MyChart    Are changes needed to the allergy or medication list? NO    Reason for visit: RECHECK

## 2023-06-20 NOTE — PATIENT INSTRUCTIONS
Please get blood and urine tests done  [facility] September 2023  Return in about 5 months (around 11/20/2023).

## 2023-06-20 NOTE — LETTER
2023       RE: Ivan Bell  [address] [facility] [facility] [address]     Dear Colleague,    Thank you for referring your patient, Ivan Bell, to the [facility] NEPHROLOG[facility] [place] at [facility]. Please see a copy of my visit note below.        Virtual Visit Details    Type of service:  Telephone Visit   Phone call duration: 13 minutes   Start 1:34 PM   End 1:47 PM     [facility]    Alma Moses MD  2023     Name: Ivan Bell  MRN: [id number]  : 1965  Referring provider: Dylan Galaviz MD     Assessment and Plan:  Ivan Bell is a 57 year old with alcohol related cirrhosis, ascites, HE and recurrent kidney stones.     # Nephrolithiasis - stone type- primarily calcium oxalate - 2022, 2022 stone analysis, again calcium oxalate 5/10/23 and 23  Fluid intake limited given cirrhosis and ascites    # elevated creatinine - currently running 1.4-1.7 mg/dL (last checked 5/10/23) in setting of hydronephrosis, previously baseline was 1.2-1.3 mg/dL  Recheck renal panel, UA and urine protein    # urinary urgency/incontinence - UA, urine culture, should follow up with urology    # history AMBER - creatinine 1.47 with eGFR 55 on 10/12/2022  - creatinine and GFR fluctuate (hemodynamic), creatinine often around 1.3 mg/dL and eGFR often close to 65     # alcohol related cirrhosis with complications of history of variceal bleed , ascites, mild portal hypertensive gastropathy and hepatic encephalopathy  - last alcohol intake was 2019 per Dr. Bales's note  - last visit with Dr. Bales 4/10/232 and MELD-Na was 19 at that time and this was an increase related to higher creatinine  - diuretics furosemide 40 mg am and 20 mg pm, has been off eplerenone  - lactulose and rifaximin     24 hour urine 2023  CT scan/imaging summer 2023  Return in about 5 months (around 2023).     Alma Longoria  "Aurelia CAPONE  [facility]  Department of Medicine  Division of Nephrology and Hypertension  [employer]  edilma Johnson Web Console     Reason For Visit:   Nephrolithiasis.     HPI:   Ivan Bell is a 57 year old  with PMHx of cirrhosis due to alcohol use, ascites, HE, TIPS in 2018 and revisions 2019, 2020, 2021 and Jan 2022 and kidney stones.  Has passed many stones over the years.     April 2022, Ivan was evaluated by Dr. Dylan Galaviz for significant bilateral kidney stone burden. There was significant stone burden on left and staged procedure planned.    7/1/2022 he had bilateral URS and laser lithotripsy with basket stone removal  7/27/2022 - bilateral URS and laser lithotripsy on left  All accessible stone fragments removed  Noted to have several calyces with evidence of stones which were deeper in the parenchyma  Ultrasound for TIPS on 8/22/22 showed bilateral nephrolithiasis  4/24/23 and 5/10/23 - staged stone removal on left with URS, laser lithotripsy and basket removal of stones      Last visit 12/13/2022 at which time we reviewed kidney stone history as well as cirrhosis with complication of ascites s/p TIPS revision Jan 2022. Also reviewed how presence of cirrhosis impacts kidney stone prevention as Ivan has been on loop diuretic and there is need to avoid citrate given risk of citrate toxicity with cirrhosis.  Litholink order was sent in January 2023 after our last visit.    Ivan was incidentally found to have new left sided hydronephrosis in April 2023 (routine ultrasound for HCC screening) so a CT scan was arranged - this showed multiple obstructing stones in left ureter. Ultimately Ivan underwent staged ureteroscopy with left URS, laser lithotripsy and basket removal of stones on 4/24/23 and 5/10/23 with Dr. Galaviz.  Was not having severe symptoms of kidney stones when that ultrasound was done but did have some symptoms in retrospect. \"didn't put me down like they did a few " "years ago\".  Doing OK now but has a significant urinary urgency and sometimes cannot make it to the bathroom. Once in awhile gets pain genital area that feels like it might be kidney stone but has not passed anything. No hematuria. No fever or chills, no N/V.  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  Fluid retention related to cirrhosis continues to be controlled since TIPS.    Review of Systems:   Pertinent items are noted in HPI or as below, remainder of complete ROS is negative.      Active Medications:   Current Outpatient Medications   Medication Sig Dispense Refill    acetaminophen (TYLENOL) 500 MG tablet Take 1,000 mg by mouth every 6 hours as needed for mild pain       Ascorbic Acid (VITAMIN C PO) Take by mouth daily      docusate sodium (COLACE) 100 MG tablet Take 1 tablet (100 mg) by mouth daily 60 tablet 1    docusate sodium (COLACE) 100 MG tablet Take 1 tablet (100 mg) by mouth daily 60 tablet 1    eplerenone (INSPRA) 50 MG tablet Take 2 tablets (100 mg) by mouth 2 times daily Patient taking as ordered as of 8/16/22. 360 tablet 3    fexofenadine (ALLEGRA) 60 MG tablet Take 1 tablet (60 mg) by mouth daily 30 tablet 1    furosemide (LASIX) 20 MG tablet Take 1 tablet (20 mg) by mouth every evening 90 tablet 3    furosemide (LASIX) 40 MG tablet Take 1 tablet (40 mg) by mouth every morning 90 tablet 3    hydrOXYzine (VISTARIL) 50 MG capsule Take 1 capsule (50 mg) by mouth at bedtime as needed, may repeat once (For anxiety and difficulty sleeping) 60 capsule 2    lactulose (CHRONULAC) 10 GM/15ML solution TAKE 30MLS BY MOUTH THREE TIMES DAILY AS NEEDED FOR CONSTIPATION (TAKE AS NEEDED TO MAINTAIN 3 TO 4 BOWEL MOVEMENTS DAILY) 2000 mL 11    Menaquinone-7 (VITAMIN K2) 100 MCG CAPS Take 200 mcg by mouth daily       MULTIPLE VITAMINS PO Take 1 tablet by mouth daily  "     oxybutynin ER (DITROPAN XL) 5 MG 24 hr tablet Take 1 tablet (5 mg) by mouth daily Take daily until your stent is removed. 30 tablet 0    potassium chloride ER (KLOR-CON M) 20 MEQ CR tablet Take 2 tablets (40 mEq) by mouth daily 120 tablet 2    rifaximin (XIFAXAN) 550 MG TABS tablet Take 1 tablet (550 mg) by mouth 2 times daily 180 tablet 3    sertraline (ZOLOFT) 25 MG tablet Take 1 tablet (25 mg) by mouth daily 90 tablet 3    sildenafil (REVATIO) 20 MG tablet Take 3-5 tabs 1/2-4 hours to relations 30 tablet 1    sodium bicarbonate 650 MG tablet Take 1 tablet (650 mg) by mouth 2 times daily 180 tablet 3    tamsulosin (FLOMAX) 0.4 MG capsule Take 1 capsule (0.4 mg) by mouth daily 30 capsule 0    traZODone (DESYREL) 50 MG tablet Take 1-2 tablets ( mg) by mouth nightly as needed for sleep 60 tablet 2    vitamin D3 (CHOLECALCIFEROL) 2000 units (50 mcg) tablet Take 1 tablet by mouth daily           Allergies:   Prednisone; Trazodone; Benadryl [diphenhydramine]; and Oxycodone      Problem list:     Patient Active Problem List   Diagnosis    Hypertriglyceridemia    Gouty arthropathy    Erectile dysfunction    CARDIOVASCULAR SCREENING; LDL GOAL LESS THAN 130    Hypertension goal BP (blood pressure) < 140/90    Alcoholic cirrhosis of liver with ascites (H)    Nephrolithiasis    CKD (chronic kidney disease) stage 2, GFR 60-89 ml/min    Seasonal allergic rhinitis, unspecified trigger    Thrombocytopenia (H)    Cervicalgia    Alcohol use disorder, moderate, in sustained remission (H)      Past Surgical History:  Past Surgical History:   Procedure Laterality Date    ANKLE SURGERY Left     ANKLE SURGERY      ARTHROSCOPY KNEE      COLONOSCOPY N/A 03/31/2016    Procedure: COLONOSCOPY;  Surgeon: Rhys Uriostegui MD;  Location: [facility]    COMBINED CYSTOSCOPY, RETROGRADES, URETEROSCOPY, LASER HOLMIUM LITHOTRIPSY URETER(S), INSERT STENT Bilateral 7/1/2022    Procedure: CYSTOSCOPY, RIGHT RETROGRADE PYELOGRAM, RIGHT  URETEROSCOPY WITH LASER LITHOTRIPSY AND BASKET REMOVAL OF STONE, RIGHT URETERAL STENT PLACEMENT, LEFT RETROGRADE PYELOGRAM, LEFT URETEROSCOPY WITH LASER LITHOTRIPSY AND BASKET REMOVAL OF STONE, LEFT URETERAL STENT PLACEMENT;  Surgeon: Dylan Galaviz MD;  Location:  OR    COMBINED CYSTOSCOPY, RETROGRADES, URETEROSCOPY, LASER HOLMIUM LITHOTRIPSY URETER(S), INSERT STENT Bilateral 7/27/2022    Procedure: CYSTOSCOPY, BILATERAL URETERAL STENT REMOVAL, BILATERAL  RETROGRADE PYELOGRAM, BILATERAL URETEROSCOPY. HOLMIUM LASER LITHOTRIPSY LEFT SIDE.  AND BASKET REMOVAL OF STONES BILATERAL, LEFT  URETERAL STENT PLACEMENT;  Surgeon: Dylan Galaviz MD;  Location:  OR    COMBINED CYSTOSCOPY, RETROGRADES, URETEROSCOPY, LASER HOLMIUM LITHOTRIPSY URETER(S), INSERT STENT Left 4/24/2023    Procedure: CYSTOSCOPY, LEFT RETROGRADE PYELOGRAM, LEFT URETEROSCOPY WITH LASER LITHOTRIOPSY AND BASKET REMOVAL OF STONES, LEFT URETERAL STENT PLACEMENT;  Surgeon: Dylan Galaviz MD;  Location:  OR    COMBINED CYSTOSCOPY, RETROGRADES, URETEROSCOPY, LASER HOLMIUM LITHOTRIPSY URETER(S), INSERT STENT Left 5/10/2023    Procedure: CYSTOSCOPY, LEFT URETERAL STENT REMOVAL, LEFT RETROGRADE PYELOGRAM, LEFT URETEROSCOPY WITH LASER LITHOTRIOPSY AND BASKET REMOVAL OF STONES, LEFT URETERAL STENT PLACEMENT;  Surgeon: Dylan Galaviz MD;  Location:  OR    ESOPHAGOSCOPY, GASTROSCOPY, DUODENOSCOPY (EGD), COMBINED N/A 03/31/2016    Procedure: COMBINED ESOPHAGOSCOPY, GASTROSCOPY, DUODENOSCOPY (EGD);  Surgeon: Rhys Uriostegui MD;  Location:  GI    ESOPHAGOSCOPY, GASTROSCOPY, DUODENOSCOPY (EGD), COMBINED N/A 03/09/2018    Procedure: COMBINED ESOPHAGOSCOPY, GASTROSCOPY, DUODENOSCOPY (EGD), BIOPSY SINGLE OR MULTIPLE;  EGD;  Surgeon: Gonzalo Wahl MD;  Location:  GI    ESOPHAGOSCOPY, GASTROSCOPY, DUODENOSCOPY (EGD), COMBINED N/A 06/07/2019    Procedure: ESOPHAGOGASTRODUODENOSCOPY (EGD);  Surgeon: Gonzalo Wahl MD;  Location:   GI    FOOT SURGERY      IR PARACENTESIS  10/29/2019    IR PARACENTESIS  11/25/2020    IR PARACENTESIS  08/11/2021    IR PARACENTESIS  01/28/2022    IR PARACENTESIS  03/30/2022    IR TRANSVEN INTRAHEPATIC PORTOSYST REV  10/29/2019    IR TRANSVEN INTRAHEPATIC PORTOSYST REV  11/25/2020    IR TRANSVEN INTRAHEPATIC PORTOSYST REV  08/11/2021    IR TRANSVEN INTRAHEPATIC PORTOSYST REV  01/28/2022    IR TRANSVEN INTRAHEPATIC PORTOSYST REV  03/30/2022    KNEE SURGERY Left     KNEE SURGERY Right     RELEASE CARPAL TUNNEL      RELEASE TRIGGER FINGER Right 06/11/2020    Procedure: RELEASE, TRIGGER FINGER, right ring and long finger;  Surgeon: Pascual Valencia MD;  Location: [facility] OR    SIGMOIDOSCOPY FLEXIBLE N/A 10/31/2017    Procedure: SIGMOIDOSCOPY FLEXIBLE;;  Surgeon: Armaan Adams MD;  Location: [facility]    TIPS Procedure  06/06/2018    TIPS PROCEDURE  11/01/2020    ZZC PLASTY KNEE,MED OR LAT COMPARTMT Right 02/19/2021    Procedure: RIGHT UNICOMPARTMENTAL ARTHROPLASTY KNEE MEDIAL;  Surgeon: José Miguel Landaverde MD;  Location: [facility];  Service: Orthopedics        Family History:   Family history is positive for kidney stones in 2 of his children and his father.      Social History:   Former smoker  Hx of alcohol abuse   in winter    Physical Exam:There were no vitals taken for this visit.  GENERAL APPEARANCE: alert and no distress  Pulmonary: normal work of breathing  NEURO: mentation intact and speech normal     Laboratory:  CMP:  Recent Labs   Lab Test 05/10/23  0706 04/24/23  1222 04/10/23  0656 10/12/22  0712 08/22/22  1148 07/01/22  1016 06/13/22  1042 08/11/21  0856 06/23/21  1805 03/26/21  1422 02/20/21  0621 02/19/21  1029 12/31/19  1612 12/31/19  1516 10/07/19  0740 08/30/19  0819 08/23/19  1131 08/02/19  0708 06/14/19  0800 06/07/19  0644 06/06/19  1749 06/06/19  0609 06/05/19  1655    140 141 143 138   < > 146*   < > 148* 145* 138 139   < >  --    < > 142   < > 138   < > 144  --   143 142   POTASSIUM 3.9 4.0 4.2 3.7 3.9   < > 4.2   < > 4.0 4.5 4.2 4.1   < >  --    < > 3.2*   < > 4.3   < > 3.4   < > 3.3* 3.4   CHLORIDE  --  110* 110* 111* 116*   < > 110*   < > 117* 114* 110* 111*   < >  --    < > 116*   < > 110*   < > 120*  --  120* 118*   CO2  --  23 24 25 23   < > 29   < > 25 29 24 20*   < >  --    < > 19*   < > 22   < > 15*  --  14* 16*   ANIONGAP  --  7 7 7 <1*   < > 7   < > 6 2* 4* 8   < >  --    < > 7   < > 6   < > 9  --  9 7   GLC  --  90 95 82 93   < > 91   < > 77 144* 157* 101   < >  --    < > 103*   < > 89   < > 93  --  81 197*   BUN  --  13.9 21.1 14.9 12   < > 14   < > 8 12 15 15   < >  --    < > 10   < > 11   < > 9  --  7 8   CR 1.48* 1.38* 1.70* 1.47* 1.20   < > 1.34*   < > 1.09 1.17 1.05 1.07   < >  --    < > 1.04   < > 1.18   < > 1.18  --  1.27* 1.19   GFRESTIMATED 54* 59* 46* 55* 70   < > 61   < > 75 69 >60 >60   < >  --    < > 80   < > 69   < > 69  --  63 68   GFRESTBLACK  --   --   --   --   --   --   --   --  86 79 >60 >60   < >  --    < > >90   < > 80   < > 80  --  73 79   JULISSA  --  9.3 9.5 9.0 8.2*   < > 9.2   < > 8.5 9.0 8.0* 8.4*   < >  --    < > 8.3*   < > 9.0   < > 9.1  --  8.8 8.7   MAG  --   --   --   --   --   --   --   --   --   --   --   --   --  1.8  --   --   --   --   --  2.0  --  1.8 1.8   PHOS  --   --   --   --   --   --   --   --   --   --   --   --   --   --   --  2.5  --  2.8  --  2.1*  --  2.2* 2.0*   PROTTOTAL  --   --  5.6* 5.6* 5.5*  --  6.0*   < > 5.6* 5.7* 5.3* 5.5*   < >  --    < >  --    < >  --    < > 5.7*  --  5.3*  --    ALBUMIN  --   --  2.9* 2.9* 2.2*  --  2.4*   < > 2.7* 2.7* 2.5* 2.7*   < >  --    < > 2.5*   < > 2.8*   < > 3.5  --  3.4  --    BILITOTAL  --   --  2.0* 1.6* 2.2*  --  2.1*   < > 3.6* 2.5* 2.6* 2.9*   < >  --    < >  --    < >  --    < > 8.0*  --  6.8*  --    ALKPHOS  --   --  124 152* 145  --  178*   < > 165* 147 88 111   < >  --    < >  --    < >  --    < > 127  --  111  --    AST  --   --  29 34 32  --  37   < > 33 28 36 40    < >  --    < >  --    < >  --    < > 37  --  38  --    ALT  --   --  <5* 7* 19  --  26   < > 19 19 15 15   < >  --    < >  --    < >  --    < > 14  --  14  --     < > = values in this interval not displayed.       CBC:  Recent Labs   Lab Test 04/10/23  0656 10/12/22  0712 08/22/22  1148 06/13/22  1042   HGB 11.0* 11.1* 10.0* 12.6*   WBC 4.4 4.0 4.2 4.3   RBC 3.48* 3.86* 3.44* 4.14*   HCT 32.7* 34.4* 31.3* 38.9*   MCV 94 89 91 94   MCH 31.6 28.8 29.1 30.4   MCHC 33.6 32.3 31.9 32.4   RDW 15.5* 18.7* 16.5* 16.8*   PLT 74* 78* 83* 83*       INR:  Recent Labs   Lab Test 04/10/23  0656 10/12/22  0712 08/22/22  1148 06/13/22  1042 03/07/22  0746 02/02/22  1520 01/28/22  0731 09/27/21  1207 08/11/21  0856 11/01/19  2013 10/29/19  1155   INR 1.47* 1.63* 1.69* 1.65*   < > 1.65* 1.57*   < > 1.67*   < > 2.06*   PTT  --   --   --   --   --  41* 40*  --  36  --  44*    < > = values in this interval not displayed.       ABG:  Recent Labs   Lab Test 03/14/19  0602   O2PER 21.0        URINE STUDIES:  Recent Labs   Lab Test 04/18/23  1016 02/03/22  0818 11/01/19  1730 06/06/19  1215   COLOR Yellow Yellow Light Red Yellow   APPEARANCE Clear Clear Slightly Cloudy Clear   URINEGLC Negative Negative Negative Negative   URINEBILI Negative Negative Negative Negative   URINEKETONE Negative Negative Negative Negative   SG 1.012 1.015 1.015 1.007   UBLD Large* Small* Large* Moderate*   URINEPH 6.0 6.5 7.0 6.5   PROTEIN 20* Negative 30* Negative   UROBILINOGEN  --  1.0  --   --    NITRITE Negative Negative Negative Negative   LEUKEST Large* Moderate* Large* Moderate*   RBCU * 5-10* >182* 68*   WBCU * * 63* 19*     Recent Labs   Lab Test 01/15/20  0716   UTPG 0.24*       PTH:   Recent Labs   Lab Test 08/02/19  0708   PTHI <7*       IRON STUDIES:   Recent Labs   Lab Test 08/02/19  0708 02/25/16  1630   IRON 113 51   * 180*   IRONSAT 47* 28   JOAQUIN 123 1,306*     Imaging:  Reviewed    13 minutes on telephone  Total time  40 minutes with chart review, documentation and coord of care.      Again, thank you for allowing me to participate in the care of your patient.      Sincerely,    Alma Moses MD

## 2023-06-27 ENCOUNTER — PATIENT OUTREACH (OUTPATIENT)
Dept: GASTROENTEROLOGY | Facility: CLINIC | Age: 58
End: 2023-06-27

## 2023-06-29 ENCOUNTER — TRANSFERRED RECORDS (OUTPATIENT)
Dept: HEALTH INFORMATION MANAGEMENT | Facility: CLINIC | Age: 58
End: 2023-06-29
Payer: COMMERCIAL

## 2023-07-06 ENCOUNTER — LAB (OUTPATIENT)
Dept: LAB | Facility: CLINIC | Age: 58
End: 2023-07-06
Attending: INTERNAL MEDICINE
Payer: COMMERCIAL

## 2023-07-06 ENCOUNTER — OFFICE VISIT (OUTPATIENT)
Dept: UROLOGY | Facility: CLINIC | Age: 58
End: 2023-07-06
Payer: COMMERCIAL

## 2023-07-06 ENCOUNTER — TRANSFERRED RECORDS (OUTPATIENT)
Dept: HEALTH INFORMATION MANAGEMENT | Facility: CLINIC | Age: 58
End: 2023-07-06

## 2023-07-06 DIAGNOSIS — R39.15 BENIGN PROSTATIC HYPERPLASIA (BPH) WITH URINARY URGENCY: Primary | ICD-10-CM

## 2023-07-06 DIAGNOSIS — R79.89 ELEVATED SERUM CREATININE: ICD-10-CM

## 2023-07-06 DIAGNOSIS — N40.1 BENIGN PROSTATIC HYPERPLASIA (BPH) WITH URINARY URGENCY: Primary | ICD-10-CM

## 2023-07-06 DIAGNOSIS — R39.15 URINARY URGENCY: ICD-10-CM

## 2023-07-06 DIAGNOSIS — N20.0 RECURRENT KIDNEY STONES: ICD-10-CM

## 2023-07-06 LAB
ALBUMIN SERPL BCG-MCNC: 2.9 G/DL (ref 3.5–5.2)
ANION GAP SERPL CALCULATED.3IONS-SCNC: 8 MMOL/L (ref 7–15)
BUN SERPL-MCNC: 12.2 MG/DL (ref 8–23)
CALCIUM SERPL-MCNC: 8.9 MG/DL (ref 8.6–10)
CHLORIDE SERPL-SCNC: 112 MMOL/L (ref 98–107)
CREAT SERPL-MCNC: 1.32 MG/DL (ref 0.67–1.17)
DEPRECATED HCO3 PLAS-SCNC: 24 MMOL/L (ref 22–29)
GFR SERPL CREATININE-BSD FRML MDRD: 62 ML/MIN/1.73M2
GLUCOSE SERPL-MCNC: 169 MG/DL (ref 70–99)
PHOSPHATE SERPL-MCNC: 2.4 MG/DL (ref 2.5–4.5)
POTASSIUM SERPL-SCNC: 3.6 MMOL/L (ref 3.4–5.3)
SODIUM SERPL-SCNC: 144 MMOL/L (ref 136–145)

## 2023-07-06 PROCEDURE — 36415 COLL VENOUS BLD VENIPUNCTURE: CPT

## 2023-07-06 PROCEDURE — 51798 US URINE CAPACITY MEASURE: CPT | Performed by: UROLOGY

## 2023-07-06 PROCEDURE — 80069 RENAL FUNCTION PANEL: CPT

## 2023-07-06 PROCEDURE — 99214 OFFICE O/P EST MOD 30 MIN: CPT | Mod: 25 | Performed by: UROLOGY

## 2023-07-06 NOTE — NURSING NOTE
Ivan Bell's chief complaint for this visit includes:  Chief Complaint   Patient presents with     RECHECK     AUA and PVR check     PCP: [place]  post void residual: 28mls    Referring Provider:  No referring provider defined for this encounter.    There were no vitals taken for this visit.  Data Unavailable        Allergies   Allergen Reactions     Trazodone Visual Disturbance     Oxycodone Other (See Comments)     Delirium and constipation  Delirium and constipation         Do you need any medication refills at today's visit? No    [facility] Visit Facilitator- Surgical Specialties

## 2023-07-06 NOTE — PROGRESS NOTES
[facility]   CHIEF COMPLAINT   It was my pleasure to see Ivan Bell who is a 57 year old male for follow-up of nephrolithiasis.      HPI   Ivan Bell is a very pleasant 57 year old male     7/1/2022: first stage Ureteroscopy   7/27/2022: second stage Ureteroscopy     4/18/2023:  He noted some gradual worsening of left-sided flank pain and discomfort over the last several months  He was recently noted to have an elevated serum creatinine and a CT scan was obtained which demonstrates significant left-sided ureteral stone burden as well as kidney stone burden    5/10/2023:  Left Ureteroscopy     5/16/2023:  Ureteral stent removal    TODAY 7/6/2023:  Has been having issues with urgency and at time urge incontinence   He notes that this has really been since May  He notes that this is worse when he is at work - he works doing cement work  He has been off this week and now doing well  Drinks mainly only water - very occasional pop    AUASS: 2/3-5-4/5-4-0-0-3/4 = 18-21  QOL = 3  PVR = 20cc    PHYSICAL EXAM  Patient is a 57 year old  male   Vitals: There were no vitals taken for this visit.  There is no height or weight on file to calculate BMI.  General Appearance Adult:   Alert, no acute distress, oriented  HENT: throat/mouth:normal, good dentition  Lungs: no respiratory distress, or pursed lip breathing  Heart: No obvious jugular venous distension present  Abdomen: soft, nontender, no organomegaly or masses  Musculoskeltal: extremities normal, no peripheral edema  Skin: no suspicious lesions or rashes  Neuro: Alert, oriented, speech and mentation normal  Psych: affect and mood normal  Gait: Normal    Creatinine   Date Value Ref Range Status   05/10/2023 1.48 (H) 0.67 - 1.17 mg/dL Final   06/23/2021 1.09 0.66 - 1.25 mg/dL Final        IMAGING:  All pertinent imaging reviewed:    All imaging studies reviewed by me.  I personally reviewed these imaging films.  A formal report from radiology will follow.    CT  ABD/PEL 4/12/2023:  FINDINGS:  CHEST:  LUNGS:   Incompletely imaged 5 x 5 mm right middle lobe noncalcified pulmonary  nodule (series 2, image 1), unchanged since 2018 and statistically  benign.     MEDIASTINUM:   Mild ventriculomegaly. No pericardial effusion.     ABDOMEN/PELVIS:  LIVER:  Cirrhotic morphology.     STOMACH:   Decompressed which limits evaluation     BILIARY:   No biliary ductal dilation. Large gallstones measuring up to 3 cm.     PANCREAS:   Subtle fat stranding along the pancreatic head and superior  mesenteric vasculature (series 3, image 174 for example).  Punctate calcifications in the pancreatic head.     SPLEEN:   16 cm splenomegaly.     ADRENAL GLANDS:   Normal.     :  Obstructing left middle and distal ureteral calculi, high density  measuring 1.0 cm superiorly (series 5, image 53 and series 3, image  344) and 1.7 x 0.8 cm inferiorly (series 5, image 53 and series 3,  image 376). Multiple additional smaller calculi. Mild periureteral fat  stranding and thickening of the mid to distal left ureter. Associated  moderate left hydroureteronephrosis.     Bilateral calyceal stones measuring up to 0.9 cm on the right (series  3, image 179) and 0.8 cm on the left (series 3, image 195).  No right hydronephrosis.  Mild bilateral renal atrophy.  No urinary bladder wall thickening.     BOWEL/PERITONEUM:   Normal caliber of the small and large bowel.   Normal appendix.  No pneumoperitoneum. No free fluid.     RETROPERITONEUM:  Normal.     VESSELS:   Limited evaluation on non-contrast exam.  TIPS     LYMPH NODES:   No lymphadenopathy.     BONES/SOFT TISSUES:   No acute osseous abnormality.                                                        IMPRESSION:  1.  Multiple obstructing left mid and distal ureteral calculi  measuring up to 1.7 cm with associated moderate left  hydroureteronephrosis.  2.  Inflammatory changes of the left ureter are likely reactive.  Correlate with urinalysis to exclude  infection.  3.  Bilateral nonobstructing calyceal stone.  4.  Stranding of the mesenteric root along the pancreatic head;  suspect due to early volume overload/congestion in the setting of  cirrhosis. However, correlate with patient history and/or lipase to  exclude pancreatitis.  5.  Cirrhosis and sequelae of portal hypertension. Recommend screening  for hepatocellular carcinoma per clinical guidelines.  6.  Gallstones measuring up to 3 cm. Consider outpatient follow-up  with general surgery.      ASSESSMENT and PLAN  57-year-old man with history of alcoholic cirrhosis status post TI PS and paracentesis with baseline elevated INR.  Noted to have significant bilateral nephrolithiasis.  He is status post a staged ureteroscopy in July 2022. Now having issues with urinary urgency    Urinary urgency with BPH  - He continues on tamsulosin 0.4 mg daily I recommend he continue on this medication  - We discussed the pathophysiology of the bladder and the prostate and normal changes associated with the development of BPH and LUTS  - I reviewed his CT scan and reviewed the images personally with no evidence of a protruding median lobe  - We discussed that given that his urinary symptoms have been gradually improving and his urinary urgency and urge incontinence is better this week while off work, I would recommend continued observation  - We discussed the option for an OAB type medication, and he will notify our office in 1 to 2 weeks if his symptoms return and he would like to try medication  - We discussed that we will try an anticholinergic and could send a prescription if needed  -he can follow-up on a as needed basis depending on how he is doing      Time spent: 20 minutes spent on the date of the encounter doing chart review, history and exam, documentation and further activities as noted above.    Dylan Galaviz MD   Urology  [facility] Phone: [phone]  M  [facility] Phone: [phone]

## 2023-07-06 NOTE — PROGRESS NOTES
"Patient had recently returned to work. He calls today to report that he \"blew out (his) knee\" and will need a total knee replacement. His doctor wants to start him on a few medications and he wanted to check with Dr Bales: meloxicam or diclofenac, and medrol 5 day pack. He will need to get a brace fit for his leg, and plans for the knee replacement in 3 months, due to a recent cortisone injection.  Per Dr. Bales, he should not take either the meloxicam or the diclofenac as they are both NSAIDs and should not be taken in liver disease. Medrol is fine, though pt may have some lower extremity swelling while on it. Message left with this information.  "

## 2023-07-07 ENCOUNTER — MEDICAL CORRESPONDENCE (OUTPATIENT)
Dept: HEALTH INFORMATION MANAGEMENT | Facility: CLINIC | Age: 58
End: 2023-07-07
Payer: COMMERCIAL

## 2023-07-17 ENCOUNTER — VIRTUAL VISIT (OUTPATIENT)
Dept: PSYCHOLOGY | Facility: CLINIC | Age: 58
End: 2023-07-17
Payer: COMMERCIAL

## 2023-07-17 DIAGNOSIS — F43.23 ACUTE ADJUSTMENT DISORDER WITH MIXED ANXIETY AND DEPRESSED MOOD: Primary | ICD-10-CM

## 2023-07-17 PROCEDURE — 90834 PSYTX W PT 45 MINUTES: CPT | Mod: 93 | Performed by: PSYCHOLOGIST

## 2023-07-17 ASSESSMENT — COLUMBIA-SUICIDE SEVERITY RATING SCALE - C-SSRS
5. HAVE YOU STARTED TO WORK OUT OR WORKED OUT THE DETAILS OF HOW TO KILL YOURSELF? DO YOU INTEND TO CARRY OUT THIS PLAN?: NO
4. HAVE YOU HAD THESE THOUGHTS AND HAD SOME INTENTION OF ACTING ON THEM?: NO
6. HAVE YOU EVER DONE ANYTHING, STARTED TO DO ANYTHING, OR PREPARED TO DO ANYTHING TO END YOUR LIFE?: NO
3. HAVE YOU BEEN THINKING ABOUT HOW YOU MIGHT KILL YOURSELF?: NO
1. IN THE PAST MONTH, HAVE YOU WISHED YOU WERE DEAD OR WISHED YOU COULD GO TO SLEEP AND NOT WAKE UP?: NO
2. HAVE YOU ACTUALLY HAD ANY THOUGHTS OF KILLING YOURSELF IN THE PAST MONTH?: NO

## 2023-07-17 NOTE — PROGRESS NOTES
[facility]   Mental Health & Addiction Services     Progress Note - Initial Visit    Patient  Name:  Ivan Bell Date: 7/17/23         Service Type: Individual     Visit Start Time: 2:000PM  Visit End Time: 2:45PM    Visit #: 1    Attendees: Client attended alone    Service Modality:  Video Visit:      Provider verified identity through the following two step process.  Patient provided:  Patient photo    Telemedicine Visit: The patient's condition can be safely assessed and treated via synchronous audio and visual telemedicine encounter.      Reason for Telemedicine Visit: Services only offered telehealth    Originating Site (Patient Location): Patient's home    Distant Site (Provider Location): Provider Remote Setting- Home Office    Consent:  The patient/guardian has verbally consented to: the potential risks and benefits of telemedicine (video visit) versus in person care; bill my insurance or make self-payment for services provided; and responsibility for payment of non-covered services.     Patient would like the video invitation sent by:  My Chart    Mode of Communication:  Video Conference via [facility]    Distant Location (Provider):  Off-site    As the provider I attest to compliance with applicable laws and regulations related to telemedicine.       DATA:   Interactive Complexity: No   Crisis: No     Presenting Concerns/  Current Stressors:   Liver disease. Drank for many years. Sober 8 years after doc told him he was gonna die, gave it up.   Needs to have knee surgery. 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 Want him full-time but knee can't handle. [place]. Likes to hunt and fish, has 2 Hungarian short-hairs.  Lives in [place] last 33 year.    Nine Grandchildren. Gets to see them multiple times per year. Come up to cabin.    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   Used to do a lot of bowling &  pool. Now spends time with friends and spouse. TV is hunting shows or westerns.       ASSESSMENT:  Mental Status Assessment:  Appearance:   Appropriate   Eye Contact:   Good   Psychomotor Behavior: Normal   Attitude:   Cooperative  Interested  Orientation:   All  Speech   Rate / Production: Normal/ Responsive   Volume:  Normal   Mood:    Sad   Affect:    Appropriate   Thought Content:  Clear   Thought Form:  Coherent   Insight:    Good       Safety Issues and Plan for Safety and Risk Management:   [place] Suicide Severity Rating Scale (Short Version)      4/21/2022    12:10 PM 6/15/2022     8:00 AM 7/1/2022    10:35 AM 7/27/2022     9:23 AM 4/24/2023    11:46 AM 5/10/2023     6:50 AM 7/17/2023     2:00 PM   [place] Suicide Severity Rating (Short Version)   Over the past 2 weeks have you felt down, depressed, or hopeless? no  no  no no    Over the past 2 weeks have you had thoughts of killing yourself? no  no no no no    Have you ever attempted to kill yourself? no  no no no no    Q1 Wished to be Dead (Past Month)  no     no   Q2 Suicidal Thoughts (Past Month)  no     no   Q3 Suicidal Thought Method  no     no   Q4 Suicidal Intent without Specific Plan  no     no   Q5 Suicide Intent with Specific Plan  no     no   Q6 Suicide Behavior (Lifetime)  no     no   Level of Risk per Screen  low risk     low risk     Patient denies current fears or concerns for personal safety.  Patient denies current or recent suicidal ideation or behaviors.  Patient denies current or recent homicidal ideation or behaviors.  Patient denies current or recent self injurious behavior or ideation.  Patient denies other safety concerns.  Recommended that patient call 911 or go to the local ED should there be a change in any of these risk factors.  Patient reports there are firearms in the house. The firearms are secured in a locked space.     Diagnostic Criteria:  Adjustment Disorder  A. The development of emotional or behavioral symptoms in  response to an identifiable stressor(s) occurring within 3 months of the onset of the stressor(s)  B. These symptoms or behaviors are clinically significant, as evidenced by one or both of the following:       - Marked distress that is out of proportion to the severity/intensity of the stressor (with consideration for external context & culture)       - Significant impairment in social, occupational, or other important areas of functioning  C. The stress-related disturbance does not meet criteria for another disorder & is not not an exacerbation of another mental disorder  D. The symptoms do not represent normal bereavement  E. Once the stressor or its consequences have terminated, the symptoms do not persist for more than an additional 6 months       * Adjustment Disorder with Mixed Anxiety and Depressed Mood: The predominant manifestation is a combination of depression and anxiety      DSM5 Diagnoses: (Sustained by DSM5 Criteria Listed Above)  Diagnoses: Adjustment Disorders  309.28 (F43.23) With mixed anxiety and depressed mood  Psychosocial & Contextual Factors: Multiple medical problems  WHODAS 2.0 (12 item):        No data to display              Intervention:   Behavioral activation  Collateral Reports Completed:  Not Applicable      PLAN: (Homework, other):  1. Provider will continue Diagnostic Assessment.  Patient was given the following to do until next session:  Complete intake paperwork    2. Provider recommended the following referrals: N/A.      3.  Suicide Risk and Safety Concerns were assessed for Ivan Bell.    Patient meets the following risk assessment and triage: Patient denied any current/recent/lifetime history of suicidal ideation and/or behaviors.  No safety plan indicated at this time.       Rickie Fox, PhD  July 17, 2023

## 2023-07-27 ENCOUNTER — VIRTUAL VISIT (OUTPATIENT)
Dept: PSYCHOLOGY | Facility: CLINIC | Age: 58
End: 2023-07-27
Payer: COMMERCIAL

## 2023-07-27 DIAGNOSIS — F43.23 ACUTE ADJUSTMENT DISORDER WITH MIXED ANXIETY AND DEPRESSED MOOD: Primary | ICD-10-CM

## 2023-07-27 PROCEDURE — 90791 PSYCH DIAGNOSTIC EVALUATION: CPT | Mod: GT | Performed by: PSYCHOLOGIST

## 2023-07-27 NOTE — PROGRESS NOTES
[facility]  Provider Name:  Rickie Fox PhD, LP        Disclaimer: Voice recognition software was used to generate this note. As a result, wrong word or 'sound-a-like' substitutions may have occurred due to the inherent limitations of voice recognition software. There may be errors in the script that have gone undetected. Please consider this when interpreting information found in this chart.     PATIENT'S NAME: Ivan Bell  PREFERRED NAME: Ivan  PRONOUNS: he/him/his     MRN: [id number]  : 1965  ADDRESS: [address] [place] [id number]  ACCT. NUMBER:  [id number]  DATE OF SERVICE: 23  START TIME: 9:00AM  END TIME: 9:45AM  PREFERRED PHONE: [phone]  May we leave a program related message: Yes  SERVICE MODALITY:  Video Visit:      Provider verified identity through the following two step process.  Patient provided:  Patient is known previously to provider    Telemedicine Visit: The patient's condition can be safely assessed and treated via synchronous audio and visual telemedicine encounter.      Reason for Telemedicine Visit: Services only offered telehealth    Originating Site (Patient Location): Patient's home    Distant Site (Provider Location): Provider Remote Setting- Home Office    Consent:  The patient/guardian has verbally consented to: the potential risks and benefits of telemedicine (video visit) versus in person care; bill my insurance or make self-payment for services provided; and responsibility for payment of non-covered services.     Patient would like the video invitation sent by:  My Chart    Mode of Communication:  [facility]    Distant Location (Provider):  Off-site    As the provider I attest to compliance with applicable laws and regulations related to telemedicine.    UNIVERSAL ADULT Mental Health DIAGNOSTIC ASSESSMENT    Identifying Information:  Patient is a 57 year old,   .  The pronoun use throughout this assessment  reflects the patient's chosen pronoun.  Patient was referred for an assessment by self and family .  Patient attended the session alone.    Chief Complaint:   Liver disease. Drank for many years. Sober 8 years after doc told him he was gonna die, gave it up.   Needs to have knee surgery. 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 Want him full-time but knee can't handle. [place]. Likes to hunt and fish, has 2 Hong Konger short-hairs.  Lives in [place] last 33 year.    Nine Grandchildren. Gets to see them multiple times per year. Come up to cabin.    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   Used to do a lot of bowling & pool. Now spends time with friends and spouse. TV is hunting shows or westerns.     Patient has not attempted to resolve these concerns in the past.    Client Reports:    Liver disease. Drank for many years. Sober 8 years after doc told him he was gonna die, gave it up.   Needs to have knee surgery. 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 Want him full-time but knee can't handle. [place]. Likes to hunt and fish, has 2 [place] Konger short-hairs.  Lives in [place] last 33 year.    Nine Grandchildren. Gets to see them multiple times per year. Come up to cabin.    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   Used to do a lot of bowling & pool. Now spends time with friends and spouse. TV is hunting shows or westerns. Let go from job last week. DT disability. Has some offers.      Social/Family History:  Patient reported they grew up in [facility].  They were raised by Parents.  Father  2 years ago. Patient reported that their childhood was rural. Always busy doing something. G[place] up [place] farm.   Patient described their current relationships with family of origin as bother , talks to sisters a couple times per year. .     The  patient describes their cultural background as  Polish and Swazi.  Patient identified their preferred language to be English  . Patient reported they do not need the assistance of an  or other support involved in therapy.     Patient reported had no significant delays in developmental tasks.   Patient's highest education level was HS graduate   .  Patient identified the following learning problems: none reported.  Modifications will not be used to assist communication in therapy.  Patient reports they are able to understand written materials.    Patient reported the following relationship history two marriages.  Patient's current relationship status is    for 26 years.   Patient identified their sexual orientation as straight  .  Patient reported having   child(lou). Patient identified family, pets   as part of their support system.  Patient identified the quality of these relationships as good.      Patient's current living/housing situation involves living in own him .  The immediate members of family and household includeSpouset and they report that housing is stable.    Patient is currently unemployed .  Patient reports their finances are obtained through work  . Patient does  identify finances as a current stressor.      Patient reported that they have not   been involved with the legal system.    Patient's Strengths and Limitations:  Patient identified the following strengths or resources that will help them succeed in treatment: friends / good social support and family support. Things that may interfere with the patient's success in treatment include: none identified.     Personal and Family Medical History:  Patient   report a family history of mental health concerns.  Patient reports family history includes Breast Cancer in his maternal grandmother; Cerebrovascular Disease (age of onset: 87) in his father; Depression in his daughter; Family History Negative in his father and mother;  Hypertension in his father; Rheumatoid Arthritis in his daughter; Substance Abuse in his brother..     Patient does not report Mental Health Diagnosis or Treatment.  Zoloft since 2019 not sure why.    Patient has had a physical exam to rule out medical causes for current symptoms.  Date of last physical exam was within the past year. Client was encouraged to follow up with PCP if symptoms were to develop. The patient has a [facility] Provider, who is named Too [place]..  Patient reports the following current medical concerns: See EMR .  Patient denies any issues with pain..   There are not significant appetite / nutritional concerns / weight changes.   Patient does not report a history of head injury / trauma / cognitive impairment.      Patient reports current meds as:   No outpatient medications have been marked as taking for the 7/27/23 encounter (Virtual Visit) with Rickie Fox, PhD.       Medication Adherence:  Patient reports  .  taking prescribed medications as prescribed.    Patient Allergies:    Allergies   Allergen Reactions    Trazodone Visual Disturbance    Oxycodone Other (See Comments)     Delirium and constipation  Delirium and constipation       Medical History:    Past Medical History:   Diagnosis Date    Alcoholic cirrhosis of liver (H)     Alcoholic hepatitis 03/2019    Chronic kidney disease     CRF    Depression     Gout     History of transfusion     Hypertension     Hypertriglyceridemia     Left calcaneus fracture 01/09/2006 January 16, 2006: Fell 10 feet from ladder onto left foot on frozen ground on 1/9/06 at home.  Immediate pain and unable to walk- seen at [place] and diagnosed with calcaneus fracture    Nephrolithiasis     Osteoarthritis     Portal vein thrombosis     left occlusion, partial main    Thrombocytopenia (H)          Current Mental Status Exam:   Appearance:  Appropriate    Eye Contact:  Good   Psychomotor:  Normal       Gait / station:  no  problem  Attitude / Demeanor: Cooperative   Speech      Rate / Production: Normal/ Responsive      Volume:  Normal  volume      Language:  intact  Mood:   Normal  Affect:   Appropriate    Thought Content: Clear   Thought Process: Coherent       Associations: No loosening of associations  Insight:   Good   Judgment:  Intact   Orientation:  All  Attention/concentration: Good    Rating Scales:    PHQ9:        9/8/2021     8:00 AM 12/16/2021     8:00 AM 3/27/2023     8:00 AM   PHQ-9 SCORE   PHQ-9 Total Score 7 6 3       GAD7:        9/8/2021     8:00 AM 12/16/2021     8:00 AM 3/27/2023     8:00 AM   OMID-7 SCORE   Total Score 4 2 2     CGI:     First:  No data recorded    Most recent  No data recorded    Substance Use:  Patient did report a family history of substance use concerns; see medical history section for details.  Brother ETOH.Patient has not received chemical dependency treatment in the past.  Patient has not ever been to detox.  Quit drinking on his own.    Patient is not currently receiving any chemical dependency treatment.           Substance History of use Age of first use Date of last use     Pattern and duration of use (include amounts and frequency)   Alcohol Does not use       2019 REPORTS SUBSTANCE USE: N/A   Cannabis         REPORTS SUBSTANCE USE: N/A     Amphetamines         REPORTS SUBSTANCE USE: N/A   Cocaine/crack            REPORTS SUBSTANCE USE: N/A   Hallucinogens           REPORTS SUBSTANCE USE: N/A   Inhalants           REPORTS SUBSTANCE USE: N/A   Heroin           REPORTS SUBSTANCE USE: N/A   Other Opiates       REPORTS SUBSTANCE USE: N/A   Benzodiazepine         REPORTS SUBSTANCE USE: N/A   Barbiturates       REPORTS SUBSTANCE USE: N/A   Over the counter meds       REPORTS SUBSTANCE USE: N/A   Caffeine       Occasional soda   Nicotine        REPORTS SUBSTANCE USE: N/A   Other substances not listed above:  Identify:        REPORTS SUBSTANCE USE: N/A     Patient reported the following  problems as a result of their substance use:  .     CAGE- AID:         No data to display                Substance Use: No symptoms    Based on the negative CAGE score and clinical interview there   Are indications of alcohol use in sustained remission .    Significant Losses / Trauma / Abuse / Neglect Issues:   Patient     Did not serve in the .  There are indications or report of significant loss, trauma, abuse or neglect issues related to: major medical problems see EMR .  Concerns for possible neglect are not present.     Safety Assessment:   Current Safety Concerns:  [place] Suicide Severity Rating Scale (Short Version)      4/21/2022    12:10 PM 6/15/2022     8:00 AM 7/1/2022    10:35 AM 7/27/2022     9:23 AM 4/24/2023    11:46 AM 5/10/2023     6:50 AM 7/17/2023     2:00 PM   [place] Suicide Severity Rating (Short Version)   Over the past 2 weeks have you felt down, depressed, or hopeless? no  no  no no    Over the past 2 weeks have you had thoughts of killing yourself? no  no no no no    Have you ever attempted to kill yourself? no  no no no no    Q1 Wished to be Dead (Past Month)  no     no   Q2 Suicidal Thoughts (Past Month)  no     no   Q3 Suicidal Thought Method  no     no   Q4 Suicidal Intent without Specific Plan  no     no   Q5 Suicide Intent with Specific Plan  no     no   Q6 Suicide Behavior (Lifetime)  no     no   Level of Risk per Screen  low risk     low risk     Patient denies current homicidal ideation and behaviors.  Patient denies current self-injurious ideation and behaviors.    Patient denied risk behaviors associated with substance use.  Patient denies any high risk behaviors associated with mental health symptoms.  Patient reports the following current concerns for their personal safety: None.  Patient reports there     firearms in the house.         The firearms are secured in a locked space.    History of Safety Concerns:  Patient denied a history of homicidal ideation.      Patient denied a history of personal safety concerns.    Patient denied a history of assaultive behaviors.    Patient denied a history of sexual assault behaviors.     Patient denied a history of risk behaviors associated with substance use.  Patient denies any history of high risk behaviors associated with mental health symptoms.  Patient reports the following protective factors:        Risk Plan:  See Recommendations for Safety and Risk Management Plan    Review of Symptoms per patient report:  Depression: No symptoms  Annmarie:  No Symptoms  Psychosis: No Symptoms  Anxiety: Excessive worry  Panic:  No symptoms  Post Traumatic Stress Disorder:  No Symptoms   Eating Disorder: No Symptoms  ADD / ADHD:  No symptoms  Conduct Disorder: No symptoms  Autism Spectrum Disorder: No symptoms  Obsessive Compulsive Disorder: No Symptoms    Patient reports the following compulsive behaviors and treatment history:  None .      Diagnostic Criteria:   Adjustment Disorder  A. The development of emotional or behavioral symptoms in response to an identifiable stressor(s) occurring within 3 months of the onset of the stressor(s)  B. These symptoms or behaviors are clinically significant, as evidenced by one or both of the following:       - Marked distress that is out of proportion to the severity/intensity of the stressor (with consideration for external context & culture)       - Significant impairment in social, occupational, or other important areas of functioning  C. The stress-related disturbance does not meet criteria for another disorder & is not not an exacerbation of another mental disorder  D. The symptoms do not represent normal bereavement  E. Once the stressor or its consequences have terminated, the symptoms do not persist for more than an additional 6 months       * Adjustment Disorder with Anxiety: The predominant manfestations are symptoms such as nervousness, worry, or jitteriness, or, in children separation anxiety  from major attachment figures    Functional Status:  Patient reports the following functional impairments: chronic disease management, self-care, and work / vocational responsibilities.     WHODAS:        No data to display              Nonprogrammatic care:  Patient is requesting basic services to address current mental health concerns.    Clinical Summary:  1. Reason for assessment: Adjustment to chronic medical condition.  2. Psychosocial, Cultural and Contextual Factors: History of alcohol abuse.  3. Principal DSM5 Diagnoses  (Sustained by DSM5 Criteria Listed Above):   Adjustment Disorders  309.24 (F43.22) With anxiety.  4. Other Diagnoses that is relevant to services:   None  5. Provisional Diagnosis: None  6. Prognosis: Expect Improvement.  7. Likely consequences of symptoms if not treated: Worsening functioning.  8. Client strengths include:  support of family, friends and providers .     Recommendations:     1. Plan for Safety and Risk Management:   Recommended that patient call 911 or go to the local ED should there be a change in any of these risk factors..          Report to child / adult protection services was NA.         3. Initial Treatment will focus on:    Adjustment related to chronic medical condition coping skills development     4. Resources/Service Plan:    services are not needed   Modifications to assist communication are not indicated   Additional disability accommodations are not indicated      5. Collaboration: Chronic medical conditions   Collaboration / coordination of treatment will be initiated with the following  support professionals:  N/A  6.  Referrals:   The following referral(s) will be initiated: N/A     A Release of Information has been obtained for the following: N/A    7. JOSSE:    JOSSE:  Discussed Discussed the general effects of drugs and alcohol on health and well-being. Provider gave patient printed information about the effects of chemical use on their health  and well being. Recommendations: None at this time maintain sobriety.     8. Records:   These were reviewed at time of assessment.   Information in this assessment was obtained from the medical record and  provided by patient who is a good historian.    Patient will have open access to their mental health medical record.      Provider Name/ Credentials:  Rickie Fox PhD, LP  July 27, 2023    Let go from job last week. DT disability. Has some offers.

## 2023-08-08 NOTE — PROGRESS NOTES
Clinic Administered Medication Documentation      Injectable Medication Documentation    Is there an active order (written within the past 365 days, with administrations remaining, not ) in the chart? Yes.     Patient was given  Shingrix 0.5 mls injected in the right deltoid . Prior to medication administration, verified patient's identity using patient s name and date of birth. Please see MAR and medication order for additional information. Patient instructed to report any adverse reaction to staff immediately.    Vial/Syringe: Syringe  Was this medication supplied by the patient? No  Is this a medication the patient will need to receive again? No - not necessary to check for refills remaining.       Pt A&O, VSS on RA. No c/o pain/nausea/SOB. Monitoring for bloody stools. Octreotide and protonix drips stopped tonight, starting on oral protonix tomorrow. Midline placed this afternoon, flushing well, pt reporting pain at site, heat applied.

## 2023-08-09 ENCOUNTER — TELEPHONE (OUTPATIENT)
Dept: GASTROENTEROLOGY | Facility: CLINIC | Age: 58
End: 2023-08-09
Payer: COMMERCIAL

## 2023-08-09 DIAGNOSIS — Z12.11 ENCOUNTER FOR SCREENING COLONOSCOPY: Primary | ICD-10-CM

## 2023-08-09 RX ORDER — BISACODYL 5 MG/1
TABLET, DELAYED RELEASE ORAL
Qty: 4 TABLET | Refills: 0 | Status: ON HOLD | OUTPATIENT
Start: 2023-08-09 | End: 2023-09-19

## 2023-08-09 NOTE — TELEPHONE ENCOUNTER
Pre assessment completed for upcoming procedure.   (Please see previous telephone encounter notes for complete details)      Procedure details:    Arrival time and facility location reviewed    Pre op exam needed? N/A    Designated  policy reviewed. Instructed to have someone stay 6 hours post procedure.     COVID policy reviewed.      Medication review:    Medications reviewed. Please see supporting documentation below. Holding recommendations discussed (if applicable).       Prep for procedure:     Reviewed procedure prep instructions.       Additional information needed?  N/A      Patient  verbalized understanding and had no questions or concerns at this time.      Denise Be RN  Endoscopy Procedure Pre Assessment RN  [phone] option 4

## 2023-08-09 NOTE — TELEPHONE ENCOUNTER
Pre visit planning completed.      Procedure details:    Patient scheduled for Colonoscopy  on 8/25/2023.     Arrival time: 0645. Procedure time 0745    Pre op exam needed? N/A    Facility location: [facility]; [address] [place] [address], 5[address] Floor, [place] [address]    Sedation type: Conscious sedation     Indication for procedure: screening      Chart review:     Electronic implanted devices? No    Diabetic? No    Diabetic medication HOLDING recommendations: (if applicable)  Oral diabetic medications: N/A  Diabetic injectables: N/A  Insulin: N/A      Medication review:    Anticoagulants? No    NSAIDS? No NSAID medications per patient's medication list.  RN will verify with pre-assessment call.    Other medication HOLDING recommendations:  N/A      Prep for procedure:     Bowel prep recommendation: Standard Golytely    Due to:  CKD noted.     Prep instructions sent via SNOBSWAP     Bowel prep script sent to    [facility] - [id number] [facility]        Denise Be RN  Endoscopy Procedure Pre Assessment RN  [phone] option 4

## 2023-08-10 ENCOUNTER — TRANSFERRED RECORDS (OUTPATIENT)
Dept: HEALTH INFORMATION MANAGEMENT | Facility: CLINIC | Age: 58
End: 2023-08-10
Payer: COMMERCIAL

## 2023-08-14 ENCOUNTER — TELEPHONE (OUTPATIENT)
Dept: GASTROENTEROLOGY | Facility: CLINIC | Age: 58
End: 2023-08-14
Payer: COMMERCIAL

## 2023-08-14 ENCOUNTER — PATIENT OUTREACH (OUTPATIENT)
Dept: GASTROENTEROLOGY | Facility: CLINIC | Age: 58
End: 2023-08-14
Payer: COMMERCIAL

## 2023-08-14 NOTE — PROGRESS NOTES
Patient called to request assistance in rescheduling colonoscopy he has later this month. Patient will be too busy and unable to make this appointment. He will then be having knee replacement surgery in September. He wishes to reschedule the scope for sometime in October. Writer called endoscopy scheduling and informed them of patient's needs. They plan to reach out to patient to assist in rescheduling. Patient is also scheduled to follow up with Dr. Bales in October, along with labs and ultrasound. Patient is otherwise doing well, having no liver related concerns.

## 2023-08-14 NOTE — TELEPHONE ENCOUNTER
Patient cancelled upcoming procedure.     Elena Mccarty RN  Endoscopy Procedure Pre Assessment RN  [phone] option 4

## 2023-08-30 RX ORDER — SPIRONOLACTONE 50 MG/1
50 TABLET, FILM COATED ORAL DAILY
Status: ON HOLD | COMMUNITY
End: 2023-09-19

## 2023-09-12 ENCOUNTER — TRANSFERRED RECORDS (OUTPATIENT)
Dept: HEALTH INFORMATION MANAGEMENT | Facility: CLINIC | Age: 58
End: 2023-09-12
Payer: COMMERCIAL

## 2023-09-13 ENCOUNTER — OFFICE VISIT (OUTPATIENT)
Dept: FAMILY MEDICINE | Facility: CLINIC | Age: 58
End: 2023-09-13
Payer: COMMERCIAL

## 2023-09-13 VITALS
TEMPERATURE: 96.7 F | HEART RATE: 64 BPM | WEIGHT: 187.4 LBS | HEIGHT: 71 IN | DIASTOLIC BLOOD PRESSURE: 74 MMHG | SYSTOLIC BLOOD PRESSURE: 138 MMHG | BODY MASS INDEX: 26.23 KG/M2 | RESPIRATION RATE: 16 BRPM | OXYGEN SATURATION: 99 %

## 2023-09-13 DIAGNOSIS — Z01.818 PREOP GENERAL PHYSICAL EXAM: Primary | ICD-10-CM

## 2023-09-13 DIAGNOSIS — N18.2 CKD (CHRONIC KIDNEY DISEASE) STAGE 2, GFR 60-89 ML/MIN: ICD-10-CM

## 2023-09-13 DIAGNOSIS — G89.29 CHRONIC PAIN OF LEFT KNEE: ICD-10-CM

## 2023-09-13 DIAGNOSIS — M25.562 CHRONIC PAIN OF LEFT KNEE: ICD-10-CM

## 2023-09-13 LAB
ALBUMIN SERPL BCG-MCNC: 2.9 G/DL (ref 3.5–5.2)
ALP SERPL-CCNC: 125 U/L (ref 40–129)
ALT SERPL W P-5'-P-CCNC: 5 U/L (ref 0–70)
ANION GAP SERPL CALCULATED.3IONS-SCNC: 9 MMOL/L (ref 7–15)
AST SERPL W P-5'-P-CCNC: 35 U/L (ref 0–45)
BILIRUB SERPL-MCNC: 2.3 MG/DL
BUN SERPL-MCNC: 10.1 MG/DL (ref 8–23)
CALCIUM SERPL-MCNC: 9.3 MG/DL (ref 8.8–10.2)
CHLORIDE SERPL-SCNC: 115 MMOL/L (ref 98–107)
CREAT SERPL-MCNC: 1.24 MG/DL (ref 0.67–1.17)
DEPRECATED HCO3 PLAS-SCNC: 21 MMOL/L (ref 22–29)
EGFRCR SERPLBLD CKD-EPI 2021: 67 ML/MIN/1.73M2
ERYTHROCYTE [DISTWIDTH] IN BLOOD BY AUTOMATED COUNT: 16.3 % (ref 10–15)
GLUCOSE SERPL-MCNC: 87 MG/DL (ref 70–99)
HCT VFR BLD AUTO: 35.2 % (ref 40–53)
HGB BLD-MCNC: 12.2 G/DL (ref 13.3–17.7)
INR PPP: 1.65 (ref 0.85–1.15)
MCH RBC QN AUTO: 32 PG (ref 26.5–33)
MCHC RBC AUTO-ENTMCNC: 34.7 G/DL (ref 31.5–36.5)
MCV RBC AUTO: 92 FL (ref 78–100)
PLATELET # BLD AUTO: 66 10E3/UL (ref 150–450)
POTASSIUM SERPL-SCNC: 4 MMOL/L (ref 3.4–5.3)
PROT SERPL-MCNC: 5.4 G/DL (ref 6.4–8.3)
RBC # BLD AUTO: 3.81 10E6/UL (ref 4.4–5.9)
SODIUM SERPL-SCNC: 145 MMOL/L (ref 136–145)
WBC # BLD AUTO: 3.6 10E3/UL (ref 4–11)

## 2023-09-13 PROCEDURE — 99214 OFFICE O/P EST MOD 30 MIN: CPT | Mod: 25 | Performed by: PHYSICIAN ASSISTANT

## 2023-09-13 PROCEDURE — 85027 COMPLETE CBC AUTOMATED: CPT | Performed by: PHYSICIAN ASSISTANT

## 2023-09-13 PROCEDURE — 85610 PROTHROMBIN TIME: CPT | Performed by: PHYSICIAN ASSISTANT

## 2023-09-13 PROCEDURE — 36415 COLL VENOUS BLD VENIPUNCTURE: CPT | Performed by: PHYSICIAN ASSISTANT

## 2023-09-13 PROCEDURE — 80053 COMPREHEN METABOLIC PANEL: CPT | Performed by: PHYSICIAN ASSISTANT

## 2023-09-13 PROCEDURE — 93000 ELECTROCARDIOGRAM COMPLETE: CPT | Performed by: PHYSICIAN ASSISTANT

## 2023-09-13 ASSESSMENT — PAIN SCALES - GENERAL: PAINLEVEL: SEVERE PAIN (7)

## 2023-09-13 NOTE — PROGRESS NOTES
[facility]  [id number] [facility]  [place] [address]  Phone: [phone]  Primary Provider: Too Washington  Pre-op Performing Provider: EDYTA WOODRUFF      PREOPERATIVE EVALUATION:  Today's date: 9/13/2023    Ivan Bell is a 58 year old male who presents for a preoperative evaluation.      9/13/2023    10:05 AM   Additional Questions   Roomed by Winnie   Accompanied by self         9/13/2023    10:05 AM   Patient Reported Additional Medications   Patient reports taking the following new medications none       Surgical Information:  Surgery/Procedure: LEFT TOTAL KNEE ARTHROPLASTY   Surgery Location: [facility]  Surgeon: José Miguel Landaverde MD   Surgery Date: 9/19/23  Time of Surgery: 2:30pm  Where patient plans to recover: At home with family  Fax number for surgical facility: Note does not need to be faxed, will be available electronically in Epic.    Assessment & Plan     The proposed surgical procedure is considered INTERMEDIATE risk.    Problem List Items Addressed This Visit          Endocrine    Hypertriglyceridemia       Urinary    CKD (chronic kidney disease) stage 2, GFR 60-89 ml/min - Primary              Risks and Recommendations:  The patient has the following additional risks and recommendations for perioperative complications:  Cardiovascular:   - stable without symptoms  Anemia/Bleeding/Clotting:    - history of elevated INR due to history of alcoholic hepatitis/cirrhosis.    Antiplatelet or Anticoagulation Medication Instructions:   - Patient is on no antiplatelet or anticoagulation medications.    Additional Medication Instructions:  Patient is to take all scheduled medications on the day of surgery EXCEPT for modifications listed below:   - Diuretics: May continue due to heart failure.   - ibuprofen (Advil, Motrin): HOLD 1 day before surgery.    - naproxen (Aleve, Naprosyn): HOLD 4 days before surgery.    - SSRIs, SNRIs, TCAs,  Antipsychotics: Continue without modification.    - sildenafil: HOLD for 24 hours.    RECOMMENDATION:  APPROVAL GIVEN to proceed with proposed procedure.    Ordering of each unique test  Prescription drug management    Subjective       HPI related to upcoming procedure: needs left TKA for chronic knee pain.        9/13/2023     9:50 AM   Preop Questions   1. Have you ever had a heart attack or stroke? No   2. Have you ever had surgery on your heart or blood vessels, such as a stent placement, a coronary artery bypass, or surgery on an artery in your head, neck, heart, or legs? YES - stent 7 years ago.    3. Do you have chest pain with activity? No   4. Do you have a history of  heart failure? No   5. Do you currently have a cold, bronchitis or symptoms of other infection? No   6. Do you have a cough, shortness of breath, or wheezing? No   7. Do you or anyone in your family have previous history of blood clots? No   8. Do you or does anyone in your family have a serious bleeding problem such as prolonged bleeding following surgeries or cuts? No   9. Have you ever had problems with anemia or been told to take iron pills? No   10. Have you had any abnormal blood loss such as black, tarry or bloody stools? No   11. Have you ever had a blood transfusion? No   12. Are you willing to have a blood transfusion if it is medically needed before, during, or after your surgery? Yes   13. Have you or any of your relatives ever had problems with anesthesia? No   14. Do you have sleep apnea, excessive snoring or daytime drowsiness? No   15. Do you have any artifical heart valves or other implanted medical devices like a pacemaker, defibrillator, or continuous glucose monitor? No   16. Do you have artificial joints? YES - partial right knee and left foot   17. Are you allergic to latex? No       Health Care Directive:  Patient does not have a Health Care Directive or Living Will: Discussed advance care planning with patient;  however, patient declined at this time.    Preoperative Review of :   reviewed - no record of controlled substances prescribed.    Status of Chronic Conditions:  CAD - Patient has a longstanding history of moderate-severe CAD. Patient denies recent chest pain or NTG use, denies exercise induced dyspnea or PND. EKG reassuring today.     RENAL INSUFFICIENCY - Patient has a longstanding history of moderate-severe chronic renal insufficiency. Last Cr 1.32.     Review of Systems  CONSTITUTIONAL: NEGATIVE for fever, chills, change in weight  INTEGUMENTARY/SKIN: NEGATIVE for worrisome rashes, moles or lesions  EYES: NEGATIVE for vision changes or irritation  ENT/MOUTH: NEGATIVE for ear, mouth and throat problems  RESP: NEGATIVE for significant cough or SOB  CV: NEGATIVE for chest pain, palpitations or peripheral edema  GI: NEGATIVE for nausea, abdominal pain, heartburn, or change in bowel habits  : NEGATIVE for frequency, dysuria, or hematuria  MUSCULOSKELETAL: NEGATIVE for significant arthralgias or myalgia  NEURO: NEGATIVE for weakness, dizziness or paresthesias  ENDOCRINE: NEGATIVE for temperature intolerance, skin/hair changes  HEME: NEGATIVE for bleeding problems  PSYCHIATRIC: NEGATIVE for changes in mood or affect    Patient Active Problem List    Diagnosis Date Noted    Alcohol use disorder, moderate, in sustained remission (H) 03/27/2023     Priority: Medium    Cervicalgia 04/13/2021     Priority: Medium    Thrombocytopenia (H) 06/05/2020     Priority: Medium    CKD (chronic kidney disease) stage 2, GFR 60-89 ml/min 04/17/2020     Priority: Medium    Seasonal allergic rhinitis, unspecified trigger 04/17/2020     Priority: Medium    Nephrolithiasis 09/23/2018     Priority: Medium     September 23, 2018 non-obstructing, incident finding on us.       Alcoholic cirrhosis of liver with ascites (H) 03/08/2016     Priority: Medium     September 23, 2018 now sober, ascites resolved, S/P TIP procedure 6/2018. Normal  liver enzymes, diminished liver function.  INR 1.6, bilirubin 2.5, albumin 2.6. He  appears healthy. Doing well. Stressed the importance of abstaining from alcohol. See copy of recent us.     9/10/18:  Impression:   1. Patent TIPS with appropriate in stent velocities. Normal Doppler  evaluation of the remainder of the hepatic vasculature in the setting  of TIPS.  2. Cirrhotic hepatic morphology with evidence of portal hypertension,  including splenomegaly. No focal hepatic mass.  3. Cholelithiasis without evidence of cholecystitis.  4. Bilateral nonobstructing nephrolithiasis.           Hypertension goal BP (blood pressure) < 140/90 12/18/2012     Priority: Medium    CARDIOVASCULAR SCREENING; LDL GOAL LESS THAN 130 10/31/2010     Priority: Medium    Erectile dysfunction 01/29/2008     Priority: Medium     September 23, 2018 no known CAD, no contraindications to sildenafil.       Gouty arthropathy 12/31/2006     Priority: Medium     Problem list name updated by automated process. Provider to review and confirm  [employer] Update utility      Hypertriglyceridemia 08/03/2005     Priority: Medium     Last Exam: December 19, 2007  Last Lipids: CHOL      211   01/25/2007 LDL      104   01/25/2007 HDL       83   01/25/2007  Last LFTs:: AST      106   01/25/2007   ALT       53   01/25/2007    TRIG      120   01/25/2007  TRIG      115   01/16/2006  Meds: Lopid 600 bid - tolerating        Past Medical History:   Diagnosis Date    Alcoholic cirrhosis of liver (H)     Alcoholic hepatitis 03/2019    Chronic kidney disease     CRF    Depression     Gout     History of transfusion     Hypertension     Hypertriglyceridemia     Left calcaneus fracture 01/09/2006 January 16, 2006: Fell 10 feet from ladder onto left foot on frozen ground on 1/9/06 at home.  Immediate pain and unable to walk- seen at [place] and diagnosed with calcaneus fracture    Nephrolithiasis     Osteoarthritis     Portal vein thrombosis     left occlusion, partial  main    Thrombocytopenia (H)      Past Surgical History:   Procedure Laterality Date    ANKLE SURGERY Left     ANKLE SURGERY      ARTHROSCOPY KNEE      COLONOSCOPY N/A 03/31/2016    Procedure: COLONOSCOPY;  Surgeon: Rhys Uriostegui MD;  Location: [facility]    COMBINED CYSTOSCOPY, RETROGRADES, URETEROSCOPY, LASER HOLMIUM LITHOTRIPSY URETER(S), INSERT STENT Bilateral 7/1/2022    Procedure: CYSTOSCOPY, RIGHT RETROGRADE PYELOGRAM, RIGHT URETEROSCOPY WITH LASER LITHOTRIPSY AND BASKET REMOVAL OF STONE, RIGHT URETERAL STENT PLACEMENT, LEFT RETROGRADE PYELOGRAM, LEFT URETEROSCOPY WITH LASER LITHOTRIPSY AND BASKET REMOVAL OF STONE, LEFT URETERAL STENT PLACEMENT;  Surgeon: Dylan Galaviz MD;  Location: [facility]    COMBINED CYSTOSCOPY, RETROGRADES, URETEROSCOPY, LASER HOLMIUM LITHOTRIPSY URETER(S), INSERT STENT Bilateral 7/27/2022    Procedure: CYSTOSCOPY, BILATERAL URETERAL STENT REMOVAL, BILATERAL  RETROGRADE PYELOGRAM, BILATERAL URETEROSCOPY. HOLMIUM LASER LITHOTRIPSY LEFT SIDE.  AND BASKET REMOVAL OF STONES BILATERAL, LEFT  URETERAL STENT PLACEMENT;  Surgeon: Dylan Galaviz MD;  Location: [facility]    COMBINED CYSTOSCOPY, RETROGRADES, URETEROSCOPY, LASER HOLMIUM LITHOTRIPSY URETER(S), INSERT STENT Left 4/24/2023    Procedure: CYSTOSCOPY, LEFT RETROGRADE PYELOGRAM, LEFT URETEROSCOPY WITH LASER LITHOTRIOPSY AND BASKET REMOVAL OF STONES, LEFT URETERAL STENT PLACEMENT;  Surgeon: Dylan Galaviz MD;  Location: [facility]    COMBINED CYSTOSCOPY, RETROGRADES, URETEROSCOPY, LASER HOLMIUM LITHOTRIPSY URETER(S), INSERT STENT Left 5/10/2023    Procedure: CYSTOSCOPY, LEFT URETERAL STENT REMOVAL, LEFT RETROGRADE PYELOGRAM, LEFT URETEROSCOPY WITH LASER LITHOTRIOPSY AND BASKET REMOVAL OF STONES, LEFT URETERAL STENT PLACEMENT;  Surgeon: Dylan Galaviz MD;  Location: [facility]    ESOPHAGOSCOPY, GASTROSCOPY, DUODENOSCOPY (EGD), COMBINED N/A 03/31/2016    Procedure: COMBINED ESOPHAGOSCOPY, GASTROSCOPY, DUODENOSCOPY (EGD);  Surgeon: Gila  Rhys PHILLIPS MD;  Location:  GI    ESOPHAGOSCOPY, GASTROSCOPY, DUODENOSCOPY (EGD), COMBINED N/A 03/09/2018    Procedure: COMBINED ESOPHAGOSCOPY, GASTROSCOPY, DUODENOSCOPY (EGD), BIOPSY SINGLE OR MULTIPLE;  EGD;  Surgeon: Gonzalo Wahl MD;  Location:  GI    ESOPHAGOSCOPY, GASTROSCOPY, DUODENOSCOPY (EGD), COMBINED N/A 06/07/2019    Procedure: ESOPHAGOGASTRODUODENOSCOPY (EGD);  Surgeon: Gonzalo Wahl MD;  Location:  GI    FOOT SURGERY      IR PARACENTESIS  10/29/2019    IR PARACENTESIS  11/25/2020    IR PARACENTESIS  08/11/2021    IR PARACENTESIS  01/28/2022    IR PARACENTESIS  03/30/2022    IR TRANSVEN INTRAHEPATIC PORTOSYST REV  10/29/2019    IR TRANSVEN INTRAHEPATIC PORTOSYST REV  11/25/2020    IR TRANSVEN INTRAHEPATIC PORTOSYST REV  08/11/2021    IR TRANSVEN INTRAHEPATIC PORTOSYST REV  01/28/2022    IR TRANSVEN INTRAHEPATIC PORTOSYST REV  03/30/2022    KNEE SURGERY Left     KNEE SURGERY Right     RELEASE CARPAL TUNNEL      RELEASE TRIGGER FINGER Right 06/11/2020    Procedure: RELEASE, TRIGGER FINGER, right ring and long finger;  Surgeon: Pascual Valencia MD;  Location:  OR    SIGMOIDOSCOPY FLEXIBLE N/A 10/31/2017    Procedure: SIGMOIDOSCOPY FLEXIBLE;;  Surgeon: Armaan Adams MD;  Location:  GI    TIPS Procedure  06/06/2018    TIPS PROCEDURE  11/01/2020    ZZC PLASTY KNEE,MED OR LAT COMPARTMT Right 02/19/2021    Procedure: RIGHT UNICOMPARTMENTAL ARTHROPLASTY KNEE MEDIAL;  Surgeon: José Miguel Landaverde MD;  Location: [facility];  Service: Orthopedics     Current Outpatient Medications   Medication Sig Dispense Refill    acetaminophen (TYLENOL) 500 MG tablet Take 1,000 mg by mouth every 6 hours as needed for mild pain       Ascorbic Acid (VITAMIN C PO) Take by mouth daily      bisacodyl (DULCOLAX) 5 MG EC tablet Take 2 tablets at 3 pm the day before your procedure. If your procedure is before 11 am, take 2 additional tablets at 11 pm. If your procedure is after 11 am,  take 2 additional tablets at 6 am. For additional instructions refer to your colonoscopy prep instructions. 4 tablet 0    docusate sodium (COLACE) 100 MG tablet Take 1 tablet (100 mg) by mouth daily 60 tablet 1    docusate sodium (COLACE) 100 MG tablet Take 1 tablet (100 mg) by mouth daily 60 tablet 1    furosemide (LASIX) 20 MG tablet Take 1 tablet (20 mg) by mouth every evening 90 tablet 3    furosemide (LASIX) 40 MG tablet Take 1 tablet (40 mg) by mouth every morning 90 tablet 3    hydrOXYzine (VISTARIL) 50 MG capsule Take 1 capsule (50 mg) by mouth at bedtime as needed, may repeat once (For anxiety and difficulty sleeping) 60 capsule 2    lactulose (CHRONULAC) 10 GM/15ML solution TAKE 30MLS BY MOUTH THREE TIMES DAILY AS NEEDED FOR CONSTIPATION (TAKE AS NEEDED TO MAINTAIN 3 TO 4 BOWEL MOVEMENTS DAILY) 2000 mL 11    Menaquinone-7 (VITAMIN K2) 100 MCG CAPS Take 200 mcg by mouth daily       MULTIPLE VITAMINS PO Take 1 tablet by mouth daily      oxybutynin ER (DITROPAN XL) 5 MG 24 hr tablet Take 1 tablet (5 mg) by mouth daily Take daily until your stent is removed. 30 tablet 0    polyethylene glycol (GOLYTELY) 236 g suspension The night before the exam at 6 pm drink an 8-ounce glass every 15 minutes until the jug is half empty. If you arrive before 11 AM: Drink the other half of the Golytely jug at 11 PM night before procedure. If you arrive after 11 AM: Drink the other half of the Golytely jug at 6 AM day of procedure. For additional instructions refer to your colonoscopy prep instructions. 4000 mL 0    potassium chloride ER (KLOR-CON M) 20 MEQ CR tablet Take 2 tablets (40 mEq) by mouth daily 120 tablet 2    rifaximin (XIFAXAN) 550 MG TABS tablet Take 1 tablet (550 mg) by mouth 2 times daily 180 tablet 3    sertraline (ZOLOFT) 25 MG tablet Take 1 tablet (25 mg) by mouth daily 90 tablet 3    sildenafil (REVATIO) 20 MG tablet Take 3-5 tabs 1/2-4 hours to relations 30 tablet 1    sodium bicarbonate 650 MG tablet Take 1  tablet (650 mg) by mouth 2 times daily 180 tablet 3    spironolactone (ALDACTONE) 50 MG tablet Take 50 mg by mouth daily      vitamin D3 (CHOLECALCIFEROL) 2000 units (50 mcg) tablet Take 1 tablet by mouth daily      eplerenone (INSPRA) 50 MG tablet Take 2 tablets (100 mg) by mouth 2 times daily Patient taking as ordered as of 22. (Patient not taking: Reported on 2023) 360 tablet 3    fexofenadine (ALLEGRA) 60 MG tablet Take 1 tablet (60 mg) by mouth daily (Patient not taking: Reported on 2023) 30 tablet 1    tamsulosin (FLOMAX) 0.4 MG capsule Take 1 capsule (0.4 mg) by mouth daily (Patient not taking: Reported on 2023) 30 capsule 0    traZODone (DESYREL) 50 MG tablet Take 1-2 tablets ( mg) by mouth nightly as needed for sleep (Patient not taking: Reported on 2023) 60 tablet 2       Allergies   Allergen Reactions    Trazodone Visual Disturbance    Oxycodone Other (See Comments)     Delirium and constipation  Delirium and constipation        Social History     Tobacco Use    Smoking status: Former     Packs/day: 0.00     Types: Dip, chew, snus or snuff, Cigarettes     Quit date: 1998     Years since quittin.0     Passive exposure: Never    Smokeless tobacco: Former     Types: Chew    Tobacco comments:     1 tin per 10 days.   Substance Use Topics    Alcohol use: No     Alcohol/week: 17.5 standard drinks of alcohol     Types: 21 Cans of beer per week     Family History   Problem Relation Age of Onset    Family History Negative Mother     Family History Negative Father     Hypertension Father     Cerebrovascular Disease Father 87    Breast Cancer Maternal Grandmother     Substance Abuse Brother     Rheumatoid Arthritis Daughter     Depression Daughter     Cancer - colorectal No family hx of     Prostate Cancer No family hx of     Liver Disease No family hx of      History   Drug Use No         Objective     BP (!) 148/80 (BP Location: Right arm, Patient Position: Sitting, Cuff  "Size: Adult Regular)   Pulse 64   Temp (!) 96.7  F (35.9  C) (Tympanic)   Resp 16   Ht 1.803 m (5' 11\")   Wt 85 kg (187 lb 6.4 oz)   SpO2 99%   BMI 26.14 kg/m      Physical Exam    GENERAL APPEARANCE: healthy, alert and no distress     EYES: EOMI,  PERRL     HENT: ear canals and TM's normal and nose and mouth without ulcers or lesions     NECK: no adenopathy, no asymmetry, masses, or scars and thyroid normal to palpation     RESP: lungs clear to auscultation - no rales, rhonchi or wheezes     CV: regular rates and rhythm, normal S1 S2, no S3 or S4 and no murmur, click or rub     ABDOMEN:  soft, nontender, no HSM or masses and bowel sounds normal     MS: extremities normal- no gross deformities noted, no evidence of inflammation in joints, FROM in all extremities.     SKIN: no suspicious lesions or rashes     NEURO: Normal strength and tone, sensory exam grossly normal, mentation intact and speech normal     PSYCH: mentation appears normal. and affect normal/bright     LYMPHATICS: No cervical adenopathy    Recent Labs   Lab Test 07/06/23  1434 05/10/23  0706 04/24/23  1222 04/10/23  0656 10/12/22  0712   HGB  --   --   --  11.0* 11.1*   PLT  --   --   --  74* 78*   INR  --   --   --  1.47* 1.63*    144   < > 141 143   POTASSIUM 3.6 3.9   < > 4.2 3.7   CR 1.32* 1.48*   < > 1.70* 1.47*    < > = values in this interval not displayed.        Diagnostics:  Results for orders placed or performed in visit on 09/13/23   CBC with platelets     Status: Abnormal   Result Value Ref Range    WBC Count 3.6 (L) 4.0 - 11.0 10e3/uL    RBC Count 3.81 (L) 4.40 - 5.90 10e6/uL    Hemoglobin 12.2 (L) 13.3 - 17.7 g/dL    Hematocrit 35.2 (L) 40.0 - 53.0 %    MCV 92 78 - 100 fL    MCH 32.0 26.5 - 33.0 pg    MCHC 34.7 31.5 - 36.5 g/dL    RDW 16.3 (H) 10.0 - 15.0 %    Platelet Count 66 (L) 150 - 450 10e3/uL   Comprehensive metabolic panel (BMP + Alb, Alk Phos, ALT, AST, Total. Bili, TP)     Status: Abnormal   Result Value Ref " Range    Sodium 145 136 - 145 mmol/L    Potassium 4.0 3.4 - 5.3 mmol/L    Chloride 115 (H) 98 - 107 mmol/L    Carbon Dioxide (CO2) 21 (L) 22 - 29 mmol/L    Anion Gap 9 7 - 15 mmol/L    Urea Nitrogen 10.1 8.0 - 23.0 mg/dL    Creatinine 1.24 (H) 0.67 - 1.17 mg/dL    Calcium 9.3 8.8 - 10.2 mg/dL    Glucose 87 70 - 99 mg/dL    Alkaline Phosphatase 125 40 - 129 U/L    AST 35 0 - 45 U/L    ALT 5 0 - 70 U/L    Protein Total 5.4 (L) 6.4 - 8.3 g/dL    Albumin 2.9 (L) 3.5 - 5.2 g/dL    Bilirubin Total 2.3 (H) <=1.2 mg/dL    GFR Estimate 67 >60 mL/min/1.73m2   INR     Status: Abnormal   Result Value Ref Range    INR 1.65 (H) 0.85 - 1.15       EKG: sinus bradycardia, normal axis, normal intervals, no acute ST/T changes c/w ischemia, no LVH by voltage criteria, unchanged from previous tracings    Revised Cardiac Risk Index (RCRI):  The patient has the following serious cardiovascular risks for perioperative complications:   - Coronary Artery Disease (MI, positive stress test, angina, Qs on EKG) = 1 point   - Congestive Heart Failure (pulmonary edema, PND, s3 edna, CXR with pulmonary congestion, basilar rales) = 1 point     RCRI Interpretation: 2 points: Class III (moderate risk - 6.6% complication rate)     Estimated Functional Capacity: Performs 4 METS exercise without symptoms (e.g., light housework, stairs, 4 mph walk, 7 mph bike, slow step dance)           Signed Electronically by: ROGER Baig  Copy of this evaluation report is provided to requesting physician.

## 2023-09-13 NOTE — PROGRESS NOTES
Discharge plan according to [facility]:       08/30/23 [id number]   Discharge Planning   Patient/Family Anticipates Transition to home with family   Living Arrangements   People in Home spouse   Type of Residence Private Residence   Is your private residence a single family home or apartment? Single family home   Number of Stairs, Within Home, Primary none   Once home, are you able to live on one level? Yes   Which level? Main Level   Bathroom Shower/Tub Walk-in shower   Equipment Currently Used at Home raised toilet seat   Support System   Support Systems Spouse/Significant Other   Do you have someone available to stay with you one or two nights once you are home? Yes

## 2023-09-13 NOTE — H&P (VIEW-ONLY)
[facility]  [address]  [place] [address]  Phone: [phone]  Primary Provider: Too Washington  Pre-op Performing Provider: EDYTA WOODRUFF      PREOPERATIVE EVALUATION:  Today's date: 9/13/2023    Ivan Bell is a 58 year old male who presents for a preoperative evaluation.      9/13/2023    10:05 AM   Additional Questions   Roomed by Winnie   Accompanied by self         9/13/2023    10:05 AM   Patient Reported Additional Medications   Patient reports taking the following new medications none       Surgical Information:  Surgery/Procedure: LEFT TOTAL KNEE ARTHROPLASTY   Surgery Location: [facility]  Surgeon: José Miguel Landaverde MD   Surgery Date: 9/19/23  Time of Surgery: 2:30pm  Where patient plans to recover: At home with family  Fax number for surgical facility: Note does not need to be faxed, will be available electronically in Epic.    Assessment & Plan     The proposed surgical procedure is considered INTERMEDIATE risk.    Problem List Items Addressed This Visit          Endocrine    Hypertriglyceridemia       Urinary    CKD (chronic kidney disease) stage 2, GFR 60-89 ml/min - Primary              Risks and Recommendations:  The patient has the following additional risks and recommendations for perioperative complications:  Cardiovascular:   - stable without symptoms  Anemia/Bleeding/Clotting:    - history of elevated INR due to history of alcoholic hepatitis/cirrhosis.    Antiplatelet or Anticoagulation Medication Instructions:   - Patient is on no antiplatelet or anticoagulation medications.    Additional Medication Instructions:  Patient is to take all scheduled medications on the day of surgery EXCEPT for modifications listed below:   - Diuretics: May continue due to heart failure.   - ibuprofen (Advil, Motrin): HOLD 1 day before surgery.    - naproxen (Aleve, Naprosyn): HOLD 4 days before surgery.    - SSRIs, SNRIs, TCAs,  Antipsychotics: Continue without modification.    - sildenafil: HOLD for 24 hours.    RECOMMENDATION:  APPROVAL GIVEN to proceed with proposed procedure.    Ordering of each unique test  Prescription drug management    Subjective       HPI related to upcoming procedure: needs left TKA for chronic knee pain.        9/13/2023     9:50 AM   Preop Questions   1. Have you ever had a heart attack or stroke? No   2. Have you ever had surgery on your heart or blood vessels, such as a stent placement, a coronary artery bypass, or surgery on an artery in your head, neck, heart, or legs? YES - stent 7 years ago.    3. Do you have chest pain with activity? No   4. Do you have a history of  heart failure? No   5. Do you currently have a cold, bronchitis or symptoms of other infection? No   6. Do you have a cough, shortness of breath, or wheezing? No   7. Do you or anyone in your family have previous history of blood clots? No   8. Do you or does anyone in your family have a serious bleeding problem such as prolonged bleeding following surgeries or cuts? No   9. Have you ever had problems with anemia or been told to take iron pills? No   10. Have you had any abnormal blood loss such as black, tarry or bloody stools? No   11. Have you ever had a blood transfusion? No   12. Are you willing to have a blood transfusion if it is medically needed before, during, or after your surgery? Yes   13. Have you or any of your relatives ever had problems with anesthesia? No   14. Do you have sleep apnea, excessive snoring or daytime drowsiness? No   15. Do you have any artifical heart valves or other implanted medical devices like a pacemaker, defibrillator, or continuous glucose monitor? No   16. Do you have artificial joints? YES - partial right knee and left foot   17. Are you allergic to latex? No       Health Care Directive:  Patient does not have a Health Care Directive or Living Will: Discussed advance care planning with patient;  however, patient declined at this time.    Preoperative Review of :   reviewed - no record of controlled substances prescribed.    Status of Chronic Conditions:  CAD - Patient has a longstanding history of moderate-severe CAD. Patient denies recent chest pain or NTG use, denies exercise induced dyspnea or PND. EKG reassuring today.     RENAL INSUFFICIENCY - Patient has a longstanding history of moderate-severe chronic renal insufficiency. Last Cr 1.32.     Review of Systems  CONSTITUTIONAL: NEGATIVE for fever, chills, change in weight  INTEGUMENTARY/SKIN: NEGATIVE for worrisome rashes, moles or lesions  EYES: NEGATIVE for vision changes or irritation  ENT/MOUTH: NEGATIVE for ear, mouth and throat problems  RESP: NEGATIVE for significant cough or SOB  CV: NEGATIVE for chest pain, palpitations or peripheral edema  GI: NEGATIVE for nausea, abdominal pain, heartburn, or change in bowel habits  : NEGATIVE for frequency, dysuria, or hematuria  MUSCULOSKELETAL: NEGATIVE for significant arthralgias or myalgia  NEURO: NEGATIVE for weakness, dizziness or paresthesias  ENDOCRINE: NEGATIVE for temperature intolerance, skin/hair changes  HEME: NEGATIVE for bleeding problems  PSYCHIATRIC: NEGATIVE for changes in mood or affect    Patient Active Problem List    Diagnosis Date Noted    Alcohol use disorder, moderate, in sustained remission (H) 03/27/2023     Priority: Medium    Cervicalgia 04/13/2021     Priority: Medium    Thrombocytopenia (H) 06/05/2020     Priority: Medium    CKD (chronic kidney disease) stage 2, GFR 60-89 ml/min 04/17/2020     Priority: Medium    Seasonal allergic rhinitis, unspecified trigger 04/17/2020     Priority: Medium    Nephrolithiasis 09/23/2018     Priority: Medium     September 23, 2018 non-obstructing, incident finding on us.       Alcoholic cirrhosis of liver with ascites (H) 03/08/2016     Priority: Medium     September 23, 2018 now sober, ascites resolved, S/P TIP procedure 6/2018. Normal  liver enzymes, diminished liver function.  INR 1.6, bilirubin 2.5, albumin 2.6. He  appears healthy. Doing well. Stressed the importance of abstaining from alcohol. See copy of recent us.     9/10/18:  Impression:   1. Patent TIPS with appropriate in stent velocities. Normal Doppler  evaluation of the remainder of the hepatic vasculature in the setting  of TIPS.  2. Cirrhotic hepatic morphology with evidence of portal hypertension,  including splenomegaly. No focal hepatic mass.  3. Cholelithiasis without evidence of cholecystitis.  4. Bilateral nonobstructing nephrolithiasis.           Hypertension goal BP (blood pressure) < 140/90 12/18/2012     Priority: Medium    CARDIOVASCULAR SCREENING; LDL GOAL LESS THAN 130 10/31/2010     Priority: Medium    Erectile dysfunction 01/29/2008     Priority: Medium     September 23, 2018 no known CAD, no contraindications to sildenafil.       Gouty arthropathy 12/31/2006     Priority: Medium     Problem list name updated by automated process. Provider to review and confirm  [employer] Update utility      Hypertriglyceridemia 08/03/2005     Priority: Medium     Last Exam: December 19, 2007  Last Lipids: CHOL      211   01/25/2007 LDL      104   01/25/2007 HDL       83   01/25/2007  Last LFTs:: AST      106   01/25/2007   ALT       53   01/25/2007    TRIG      120   01/25/2007  TRIG      115   01/16/2006  Meds: Lopid 600 bid - tolerating        Past Medical History:   Diagnosis Date    Alcoholic cirrhosis of liver (H)     Alcoholic hepatitis 03/2019    Chronic kidney disease     CRF    Depression     Gout     History of transfusion     Hypertension     Hypertriglyceridemia     Left calcaneus fracture 01/09/2006 January 16, 2006: Fell 10 feet from ladder onto left foot on frozen ground on 1/9/06 at home.  Immediate pain and unable to walk- seen at [place] and diagnosed with calcaneus fracture    Nephrolithiasis     Osteoarthritis     Portal vein thrombosis     left occlusion, partial  main    Thrombocytopenia (H)      Past Surgical History:   Procedure Laterality Date    ANKLE SURGERY Left     ANKLE SURGERY      ARTHROSCOPY KNEE      COLONOSCOPY N/A 03/31/2016    Procedure: COLONOSCOPY;  Surgeon: Rhys Uriostegui MD;  Location: [facility]    COMBINED CYSTOSCOPY, RETROGRADES, URETEROSCOPY, LASER HOLMIUM LITHOTRIPSY URETER(S), INSERT STENT Bilateral 7/1/2022    Procedure: CYSTOSCOPY, RIGHT RETROGRADE PYELOGRAM, RIGHT URETEROSCOPY WITH LASER LITHOTRIPSY AND BASKET REMOVAL OF STONE, RIGHT URETERAL STENT PLACEMENT, LEFT RETROGRADE PYELOGRAM, LEFT URETEROSCOPY WITH LASER LITHOTRIPSY AND BASKET REMOVAL OF STONE, LEFT URETERAL STENT PLACEMENT;  Surgeon: Dylan Galaviz MD;  Location: [facility]    COMBINED CYSTOSCOPY, RETROGRADES, URETEROSCOPY, LASER HOLMIUM LITHOTRIPSY URETER(S), INSERT STENT Bilateral 7/27/2022    Procedure: CYSTOSCOPY, BILATERAL URETERAL STENT REMOVAL, BILATERAL  RETROGRADE PYELOGRAM, BILATERAL URETEROSCOPY. HOLMIUM LASER LITHOTRIPSY LEFT SIDE.  AND BASKET REMOVAL OF STONES BILATERAL, LEFT  URETERAL STENT PLACEMENT;  Surgeon: Dylan Galaviz MD;  Location: [facility]    COMBINED CYSTOSCOPY, RETROGRADES, URETEROSCOPY, LASER HOLMIUM LITHOTRIPSY URETER(S), INSERT STENT Left 4/24/2023    Procedure: CYSTOSCOPY, LEFT RETROGRADE PYELOGRAM, LEFT URETEROSCOPY WITH LASER LITHOTRIOPSY AND BASKET REMOVAL OF STONES, LEFT URETERAL STENT PLACEMENT;  Surgeon: Dylan Galaviz MD;  Location: [facility]    COMBINED CYSTOSCOPY, RETROGRADES, URETEROSCOPY, LASER HOLMIUM LITHOTRIPSY URETER(S), INSERT STENT Left 5/10/2023    Procedure: CYSTOSCOPY, LEFT URETERAL STENT REMOVAL, LEFT RETROGRADE PYELOGRAM, LEFT URETEROSCOPY WITH LASER LITHOTRIOPSY AND BASKET REMOVAL OF STONES, LEFT URETERAL STENT PLACEMENT;  Surgeon: Dylan Galaviz MD;  Location: [facility]    ESOPHAGOSCOPY, GASTROSCOPY, DUODENOSCOPY (EGD), COMBINED N/A 03/31/2016    Procedure: COMBINED ESOPHAGOSCOPY, GASTROSCOPY, DUODENOSCOPY (EGD);  Surgeon: Gila  Rhys PHILLIPS MD;  Location:  GI    ESOPHAGOSCOPY, GASTROSCOPY, DUODENOSCOPY (EGD), COMBINED N/A 03/09/2018    Procedure: COMBINED ESOPHAGOSCOPY, GASTROSCOPY, DUODENOSCOPY (EGD), BIOPSY SINGLE OR MULTIPLE;  EGD;  Surgeon: Gonzalo Wahl MD;  Location:  GI    ESOPHAGOSCOPY, GASTROSCOPY, DUODENOSCOPY (EGD), COMBINED N/A 06/07/2019    Procedure: ESOPHAGOGASTRODUODENOSCOPY (EGD);  Surgeon: Gonzalo Wahl MD;  Location:  GI    FOOT SURGERY      IR PARACENTESIS  10/29/2019    IR PARACENTESIS  11/25/2020    IR PARACENTESIS  08/11/2021    IR PARACENTESIS  01/28/2022    IR PARACENTESIS  03/30/2022    IR TRANSVEN INTRAHEPATIC PORTOSYST REV  10/29/2019    IR TRANSVEN INTRAHEPATIC PORTOSYST REV  11/25/2020    IR TRANSVEN INTRAHEPATIC PORTOSYST REV  08/11/2021    IR TRANSVEN INTRAHEPATIC PORTOSYST REV  01/28/2022    IR TRANSVEN INTRAHEPATIC PORTOSYST REV  03/30/2022    KNEE SURGERY Left     KNEE SURGERY Right     RELEASE CARPAL TUNNEL      RELEASE TRIGGER FINGER Right 06/11/2020    Procedure: RELEASE, TRIGGER FINGER, right ring and long finger;  Surgeon: Pascual Valencia MD;  Location:  OR    SIGMOIDOSCOPY FLEXIBLE N/A 10/31/2017    Procedure: SIGMOIDOSCOPY FLEXIBLE;;  Surgeon: Armaan Adams MD;  Location:  GI    TIPS Procedure  06/06/2018    TIPS PROCEDURE  11/01/2020    ZZC PLASTY KNEE,MED OR LAT COMPARTMT Right 02/19/2021    Procedure: RIGHT UNICOMPARTMENTAL ARTHROPLASTY KNEE MEDIAL;  Surgeon: José Miguel Landaverde MD;  Location: [facility];  Service: Orthopedics     Current Outpatient Medications   Medication Sig Dispense Refill    acetaminophen (TYLENOL) 500 MG tablet Take 1,000 mg by mouth every 6 hours as needed for mild pain       Ascorbic Acid (VITAMIN C PO) Take by mouth daily      bisacodyl (DULCOLAX) 5 MG EC tablet Take 2 tablets at 3 pm the day before your procedure. If your procedure is before 11 am, take 2 additional tablets at 11 pm. If your procedure is after 11 am,  take 2 additional tablets at 6 am. For additional instructions refer to your colonoscopy prep instructions. 4 tablet 0    docusate sodium (COLACE) 100 MG tablet Take 1 tablet (100 mg) by mouth daily 60 tablet 1    docusate sodium (COLACE) 100 MG tablet Take 1 tablet (100 mg) by mouth daily 60 tablet 1    furosemide (LASIX) 20 MG tablet Take 1 tablet (20 mg) by mouth every evening 90 tablet 3    furosemide (LASIX) 40 MG tablet Take 1 tablet (40 mg) by mouth every morning 90 tablet 3    hydrOXYzine (VISTARIL) 50 MG capsule Take 1 capsule (50 mg) by mouth at bedtime as needed, may repeat once (For anxiety and difficulty sleeping) 60 capsule 2    lactulose (CHRONULAC) 10 GM/15ML solution TAKE 30MLS BY MOUTH THREE TIMES DAILY AS NEEDED FOR CONSTIPATION (TAKE AS NEEDED TO MAINTAIN 3 TO 4 BOWEL MOVEMENTS DAILY) 2000 mL 11    Menaquinone-7 (VITAMIN K2) 100 MCG CAPS Take 200 mcg by mouth daily       MULTIPLE VITAMINS PO Take 1 tablet by mouth daily      oxybutynin ER (DITROPAN XL) 5 MG 24 hr tablet Take 1 tablet (5 mg) by mouth daily Take daily until your stent is removed. 30 tablet 0    polyethylene glycol (GOLYTELY) 236 g suspension The night before the exam at 6 pm drink an 8-ounce glass every 15 minutes until the jug is half empty. If you arrive before 11 AM: Drink the other half of the Golytely jug at 11 PM night before procedure. If you arrive after 11 AM: Drink the other half of the Golytely jug at 6 AM day of procedure. For additional instructions refer to your colonoscopy prep instructions. 4000 mL 0    potassium chloride ER (KLOR-CON M) 20 MEQ CR tablet Take 2 tablets (40 mEq) by mouth daily 120 tablet 2    rifaximin (XIFAXAN) 550 MG TABS tablet Take 1 tablet (550 mg) by mouth 2 times daily 180 tablet 3    sertraline (ZOLOFT) 25 MG tablet Take 1 tablet (25 mg) by mouth daily 90 tablet 3    sildenafil (REVATIO) 20 MG tablet Take 3-5 tabs 1/2-4 hours to relations 30 tablet 1    sodium bicarbonate 650 MG tablet Take 1  tablet (650 mg) by mouth 2 times daily 180 tablet 3    spironolactone (ALDACTONE) 50 MG tablet Take 50 mg by mouth daily      vitamin D3 (CHOLECALCIFEROL) 2000 units (50 mcg) tablet Take 1 tablet by mouth daily      eplerenone (INSPRA) 50 MG tablet Take 2 tablets (100 mg) by mouth 2 times daily Patient taking as ordered as of 22. (Patient not taking: Reported on 2023) 360 tablet 3    fexofenadine (ALLEGRA) 60 MG tablet Take 1 tablet (60 mg) by mouth daily (Patient not taking: Reported on 2023) 30 tablet 1    tamsulosin (FLOMAX) 0.4 MG capsule Take 1 capsule (0.4 mg) by mouth daily (Patient not taking: Reported on 2023) 30 capsule 0    traZODone (DESYREL) 50 MG tablet Take 1-2 tablets ( mg) by mouth nightly as needed for sleep (Patient not taking: Reported on 2023) 60 tablet 2       Allergies   Allergen Reactions    Trazodone Visual Disturbance    Oxycodone Other (See Comments)     Delirium and constipation  Delirium and constipation        Social History     Tobacco Use    Smoking status: Former     Packs/day: 0.00     Types: Dip, chew, snus or snuff, Cigarettes     Quit date: 1998     Years since quittin.0     Passive exposure: Never    Smokeless tobacco: Former     Types: Chew    Tobacco comments:     1 tin per 10 days.   Substance Use Topics    Alcohol use: No     Alcohol/week: 17.5 standard drinks of alcohol     Types: 21 Cans of beer per week     Family History   Problem Relation Age of Onset    Family History Negative Mother     Family History Negative Father     Hypertension Father     Cerebrovascular Disease Father 87    Breast Cancer Maternal Grandmother     Substance Abuse Brother     Rheumatoid Arthritis Daughter     Depression Daughter     Cancer - colorectal No family hx of     Prostate Cancer No family hx of     Liver Disease No family hx of      History   Drug Use No         Objective     BP (!) 148/80 (BP Location: Right arm, Patient Position: Sitting, Cuff  "Size: Adult Regular)   Pulse 64   Temp (!) 96.7  F (35.9  C) (Tympanic)   Resp 16   Ht 1.803 m (5' 11\")   Wt 85 kg (187 lb 6.4 oz)   SpO2 99%   BMI 26.14 kg/m      Physical Exam    GENERAL APPEARANCE: healthy, alert and no distress     EYES: EOMI,  PERRL     HENT: ear canals and TM's normal and nose and mouth without ulcers or lesions     NECK: no adenopathy, no asymmetry, masses, or scars and thyroid normal to palpation     RESP: lungs clear to auscultation - no rales, rhonchi or wheezes     CV: regular rates and rhythm, normal S1 S2, no S3 or S4 and no murmur, click or rub     ABDOMEN:  soft, nontender, no HSM or masses and bowel sounds normal     MS: extremities normal- no gross deformities noted, no evidence of inflammation in joints, FROM in all extremities.     SKIN: no suspicious lesions or rashes     NEURO: Normal strength and tone, sensory exam grossly normal, mentation intact and speech normal     PSYCH: mentation appears normal. and affect normal/bright     LYMPHATICS: No cervical adenopathy    Recent Labs   Lab Test 07/06/23  1434 05/10/23  0706 04/24/23  1222 04/10/23  0656 10/12/22  0712   HGB  --   --   --  11.0* 11.1*   PLT  --   --   --  74* 78*   INR  --   --   --  1.47* 1.63*    144   < > 141 143   POTASSIUM 3.6 3.9   < > 4.2 3.7   CR 1.32* 1.48*   < > 1.70* 1.47*    < > = values in this interval not displayed.        Diagnostics:  Results for orders placed or performed in visit on 09/13/23   CBC with platelets     Status: Abnormal   Result Value Ref Range    WBC Count 3.6 (L) 4.0 - 11.0 10e3/uL    RBC Count 3.81 (L) 4.40 - 5.90 10e6/uL    Hemoglobin 12.2 (L) 13.3 - 17.7 g/dL    Hematocrit 35.2 (L) 40.0 - 53.0 %    MCV 92 78 - 100 fL    MCH 32.0 26.5 - 33.0 pg    MCHC 34.7 31.5 - 36.5 g/dL    RDW 16.3 (H) 10.0 - 15.0 %    Platelet Count 66 (L) 150 - 450 10e3/uL   Comprehensive metabolic panel (BMP + Alb, Alk Phos, ALT, AST, Total. Bili, TP)     Status: Abnormal   Result Value Ref " Range    Sodium 145 136 - 145 mmol/L    Potassium 4.0 3.4 - 5.3 mmol/L    Chloride 115 (H) 98 - 107 mmol/L    Carbon Dioxide (CO2) 21 (L) 22 - 29 mmol/L    Anion Gap 9 7 - 15 mmol/L    Urea Nitrogen 10.1 8.0 - 23.0 mg/dL    Creatinine 1.24 (H) 0.67 - 1.17 mg/dL    Calcium 9.3 8.8 - 10.2 mg/dL    Glucose 87 70 - 99 mg/dL    Alkaline Phosphatase 125 40 - 129 U/L    AST 35 0 - 45 U/L    ALT 5 0 - 70 U/L    Protein Total 5.4 (L) 6.4 - 8.3 g/dL    Albumin 2.9 (L) 3.5 - 5.2 g/dL    Bilirubin Total 2.3 (H) <=1.2 mg/dL    GFR Estimate 67 >60 mL/min/1.73m2   INR     Status: Abnormal   Result Value Ref Range    INR 1.65 (H) 0.85 - 1.15       EKG: sinus bradycardia, normal axis, normal intervals, no acute ST/T changes c/w ischemia, no LVH by voltage criteria, unchanged from previous tracings    Revised Cardiac Risk Index (RCRI):  The patient has the following serious cardiovascular risks for perioperative complications:   - Coronary Artery Disease (MI, positive stress test, angina, Qs on EKG) = 1 point   - Congestive Heart Failure (pulmonary edema, PND, s3 edna, CXR with pulmonary congestion, basilar rales) = 1 point     RCRI Interpretation: 2 points: Class III (moderate risk - 6.6% complication rate)     Estimated Functional Capacity: Performs 4 METS exercise without symptoms (e.g., light housework, stairs, 4 mph walk, 7 mph bike, slow step dance)           Signed Electronically by: ROGER Baig  Copy of this evaluation report is provided to requesting physician.

## 2023-09-13 NOTE — PATIENT INSTRUCTIONS
Chiquita Love,    Thank you for allowing [facility] to manage your care.    I ordered some lab work. Please go to the laboratory to get your studies.    Please allow 1-2 business days for our office to contact you in regards to your laboratory/radiological studies.  If not done so, I encourage you to login into [employer] [email or web address] to review your results as well.     If you have any questions or concerns, please feel free to call us at [phone]    Sincerely,    Justin Sanchez PA-C    Did you know?      You can schedule a video visit for follow-up appointments as well as future appointments for certain conditions.  Please see the below link.     [email or web address]    If you have not already done so,  I encourage you to sign up for [employer] ([email or web address]).  This will allow you to review your results, securely communicate with a provider, and schedule virtual visits as well.    For informational purposes only. Not to replace the advice of your health care provider. Copyright   2003, 2019 [facility]. All rights reserved. Clinically reviewed by Na Orantes MD. [employer] 103437 - REV 12/22.  Preparing for Your Surgery  Getting started  A nurse will call you to review your health history and instructions. They will give you an arrival time based on your scheduled surgery time. Please be ready to share:  Your doctor's clinic name and phone number  Your medical, surgical, and anesthesia history  A list of allergies and sensitivities  A list of medicines, including herbal treatments and over-the-counter drugs  Whether the patient has a legal guardian (ask how to send us the papers in advance)  Please tell us if you're pregnant--or if there's any chance you might be pregnant. Some surgeries may injure a fetus (unborn baby), so they require a pregnancy test. Surgeries that are safe for a fetus don't always need a  test, and you can choose whether to have one.   If you have a child who's having surgery, please ask for a copy of Preparing for Your Child's Surgery.    Preparing for surgery  Within 10 to 30 days of surgery: Have a pre-op exam (sometimes called an H&P, or History and Physical). This can be done at a clinic or pre-operative center.  If you're having a , you may not need this exam. Talk to your care team.  At your pre-op exam, talk to your care team about all medicines you take. If you need to stop any medicines before surgery, ask when to start taking them again.  We do this for your safety. Many medicines can make you bleed too much during surgery. Some change how well surgery (anesthesia) drugs work.  Call your insurance company to let them know you're having surgery. (If you don't have insurance, call [phone].)  Call your clinic if there's any change in your health. This includes signs of a cold or flu (sore throat, runny nose, cough, rash, fever). It also includes a scrape or scratch near the surgery site.  If you have questions on the day of surgery, call your hospital or surgery center.  Eating and drinking guidelines  For your safety: Unless your surgeon tells you otherwise, follow the guidelines below.  Eat and drink as usual until 8 hours before you arrive for surgery. After that, no food or milk.  Drink clear liquids until 2 hours before you arrive. These are liquids you can see through, like water, Gatorade, and Propel Water. They also include plain black coffee and tea (no cream or milk), candy, and breath mints. You can spit out gum when you arrive.  If you drink alcohol: Stop drinking it the night before surgery.  If your care team tells you to take medicine on the morning of surgery, it's okay to take it with a sip of water.  Preventing infection  Shower or bathe the night before and morning of your surgery. Follow the instructions your clinic gave you. (If no instructions, use regular  soap.)  Don't shave or clip hair near your surgery site. We'll remove the hair if needed.  Don't smoke or vape the morning of surgery. You may chew nicotine gum up to 2 hours before surgery. A nicotine patch is okay.  Note: Some surgeries require you to completely quit smoking and nicotine. Check with your surgeon.  Your care team will make every effort to keep you safe from infection. We will:  Clean our hands often with soap and water (or an alcohol-based hand rub).  Clean the skin at your surgery site with a special soap that kills germs.  Give you a special gown to keep you warm. (Cold raises the risk of infection.)  Wear special hair covers, masks, gowns and gloves during surgery.  Give antibiotic medicine, if prescribed. Not all surgeries need antibiotics.  What to bring on the day of surgery  Photo ID and insurance card  Copy of your health care directive, if you have one  Glasses and hearing aids (bring cases)  You can't wear contacts during surgery  Inhaler and eye drops, if you use them (tell us about these when you arrive)  CPAP machine or breathing device, if you use them  A few personal items, if spending the night  If you have . . .  A pacemaker, ICD (cardiac defibrillator) or other implant: Bring the ID card.  An implanted stimulator: Bring the remote control.  A legal guardian: Bring a copy of the certified (court-stamped) guardianship papers.  Please remove any jewelry, including body piercings. Leave jewelry and other valuables at home.  If you're going home the day of surgery  You must have a responsible adult drive you home. They should stay with you overnight as well.  If you don't have someone to stay with you, and you aren't safe to go home alone, we may keep you overnight. Insurance often won't pay for this.  After surgery  If it's hard to control your pain or you need more pain medicine, please call your surgeon's office.  Questions?   If you have any questions for your care team, list them  here: _________________________________________________________________________________________________________________________________________________________________________ ____________________________________ ____________________________________ ____________________________________    How to Take Your Medication Before Surgery  - HOLD (do not take) sildenafil 24 hours before your procedure.

## 2023-09-18 ENCOUNTER — ANESTHESIA EVENT (OUTPATIENT)
Dept: SURGERY | Facility: CLINIC | Age: 58
End: 2023-09-18
Payer: COMMERCIAL

## 2023-09-18 LAB
BLD PROD TYP BPU: NORMAL
BLD PROD TYP BPU: NORMAL
BLOOD COMPONENT TYPE: NORMAL
BLOOD COMPONENT TYPE: NORMAL
CODING SYSTEM: NORMAL
CODING SYSTEM: NORMAL
ISSUE DATE AND TIME: NORMAL
ISSUE DATE AND TIME: NORMAL
UNIT ABO/RH: NORMAL
UNIT ABO/RH: NORMAL
UNIT NUMBER: NORMAL
UNIT NUMBER: NORMAL
UNIT STATUS: NORMAL
UNIT STATUS: NORMAL
UNIT TYPE ISBT: 6200
UNIT TYPE ISBT: 6200

## 2023-09-18 NOTE — PROVIDER NOTIFICATION
I am evaluating this patient for upcoming Left Total Knee Arthroplasty with Dr. Landaverde at [facility] on 9/19/23:    - Reviewed preop H&P and labs. Patient cleared for surgery by PCP but has liver cirrhosis and INR 1.65, platelet count 66,000 on 9/13/23. Patient has chronically elevated INR and chronic thrombocytopenia due to liver cirrhosis. He is on daily vitamin K. He had right unicompartmental knee arthroplasty here 2/2021 and required preop transfusion with platelets and FFP prior to that surgery. Sent message to Dr. Landaverde and his PA's notifying them of information above. Waiting for response.     - Update 9/18/23 at 10:28 am: Dr. Landaverde ok proceeding but wants to have platelets near 100,000 and INR < 1.5 before surgery. He thinks we should give transfusion with platelets and possibly with FFP in preop as we did before patient's right knee surgery here in 2021. I spoke to [facility]. No history of antibodies in blood. Ok to check blood type & screen in preop on day of surgery. I will coordinate with preop nursing and patient- will plan to bring patient in extra early tomorrow to allow time for testing (blood type & screen, updated INR, updated CBC with platelets) and for transfusion of platelets and FFP.  Blood Bank recommends that we wait for results of updated INR in preop on day of surgery before deciding whether to transfuse with FFP as they don't want to thaw a unit of FFP if it does not need to be used. I will enter orders later today. Call me below if any questions.     Update 9/18/23 at 1:45 pm: Preop RN called patient and confirmed that he will arriver early at 0930 tomorrow. I have placed orders for STAT blood type & screen, INR, and CBC with platelets in Epic under signed/held preop orders. I have also placed orders for 2 units platelets to be ready on day of surgery, to transfuse with 1 unit platelets in preop, for 1 unit of FFP plasma to be ready tomorrow (all under active  orders).  Decision about whether patient needs FFP plasma transfusion in preop will be up to Dr. Landaverde's Team based on results of updated INR on morning of surgery. Full Treatment Plan note to follow. Call me below if any questions.       GILLIAN Correa, CNP   Advanced Practice Nurse Navigator- Orthopedics  [facility]   Office Phone: [phone]  Direct Fax: [phone]

## 2023-09-19 ENCOUNTER — ANESTHESIA (OUTPATIENT)
Dept: SURGERY | Facility: CLINIC | Age: 58
End: 2023-09-19
Payer: COMMERCIAL

## 2023-09-19 ENCOUNTER — HOSPITAL ENCOUNTER (OUTPATIENT)
Facility: CLINIC | Age: 58
Discharge: HOME | End: 2023-09-22
Attending: ORTHOPAEDIC SURGERY | Admitting: ORTHOPAEDIC SURGERY
Payer: COMMERCIAL

## 2023-09-19 DIAGNOSIS — K76.82 HEPATIC ENCEPHALOPATHY (H): ICD-10-CM

## 2023-09-19 DIAGNOSIS — M17.12 PRIMARY OSTEOARTHRITIS OF LEFT KNEE: Primary | ICD-10-CM

## 2023-09-19 DIAGNOSIS — K70.31 ALCOHOLIC CIRRHOSIS OF LIVER WITH ASCITES (H): ICD-10-CM

## 2023-09-19 PROBLEM — M17.9 KNEE OSTEOARTHRITIS: Status: ACTIVE | Noted: 2023-09-19

## 2023-09-19 LAB
ABO/RH(D): NORMAL
ANTIBODY SCREEN: NEGATIVE
BLD PROD TYP BPU: NORMAL
BLOOD COMPONENT TYPE: NORMAL
CODING SYSTEM: NORMAL
ERYTHROCYTE [DISTWIDTH] IN BLOOD BY AUTOMATED COUNT: 16 % (ref 10–15)
ERYTHROCYTE [DISTWIDTH] IN BLOOD BY AUTOMATED COUNT: 16.1 % (ref 10–15)
HCT VFR BLD AUTO: 31.2 % (ref 40–53)
HCT VFR BLD AUTO: 32.4 % (ref 40–53)
HGB BLD-MCNC: 10.5 G/DL (ref 13.3–17.7)
HGB BLD-MCNC: 10.9 G/DL (ref 13.3–17.7)
INR PPP: 1.7 (ref 0.85–1.15)
ISSUE DATE AND TIME: NORMAL
MCH RBC QN AUTO: 31.6 PG (ref 26.5–33)
MCH RBC QN AUTO: 32.1 PG (ref 26.5–33)
MCHC RBC AUTO-ENTMCNC: 33.6 G/DL (ref 31.5–36.5)
MCHC RBC AUTO-ENTMCNC: 33.7 G/DL (ref 31.5–36.5)
MCV RBC AUTO: 94 FL (ref 78–100)
MCV RBC AUTO: 95 FL (ref 78–100)
PLATELET # BLD AUTO: 55 10E3/UL (ref 150–450)
PLATELET # BLD AUTO: 58 10E3/UL (ref 150–450)
RBC # BLD AUTO: 3.27 10E6/UL (ref 4.4–5.9)
RBC # BLD AUTO: 3.45 10E6/UL (ref 4.4–5.9)
SPECIMEN EXPIRATION DATE: NORMAL
UNIT ABO/RH: NORMAL
UNIT NUMBER: NORMAL
UNIT STATUS: NORMAL
UNIT TYPE ISBT: 600
WBC # BLD AUTO: 2.4 10E3/UL (ref 4–11)
WBC # BLD AUTO: 3.1 10E3/UL (ref 4–11)

## 2023-09-19 PROCEDURE — 85027 COMPLETE CBC AUTOMATED: CPT | Performed by: NURSE PRACTITIONER

## 2023-09-19 PROCEDURE — 250N000009 HC RX 250: Performed by: NURSE ANESTHETIST, CERTIFIED REGISTERED

## 2023-09-19 PROCEDURE — 99204 OFFICE O/P NEW MOD 45 MIN: CPT | Performed by: FAMILY MEDICINE

## 2023-09-19 PROCEDURE — 258N000001 HC RX 258: Performed by: ORTHOPAEDIC SURGERY

## 2023-09-19 PROCEDURE — 710N000010 HC RECOVERY PHASE 1, LEVEL 2, PER MIN: Performed by: ORTHOPAEDIC SURGERY

## 2023-09-19 PROCEDURE — P9059 PLASMA, FRZ BETWEEN 8-24HOUR: HCPCS | Performed by: NURSE PRACTITIONER

## 2023-09-19 PROCEDURE — C1713 ANCHOR/SCREW BN/BN,TIS/BN: HCPCS | Performed by: ORTHOPAEDIC SURGERY

## 2023-09-19 PROCEDURE — 85610 PROTHROMBIN TIME: CPT | Performed by: NURSE PRACTITIONER

## 2023-09-19 PROCEDURE — 999N000141 HC STATISTIC PRE-PROCEDURE NURSING ASSESSMENT: Performed by: ORTHOPAEDIC SURGERY

## 2023-09-19 PROCEDURE — P9059 PLASMA, FRZ BETWEEN 8-24HOUR: HCPCS | Performed by: ORTHOPAEDIC SURGERY

## 2023-09-19 PROCEDURE — 250N000011 HC RX IP 250 OP 636: Performed by: STUDENT IN AN ORGANIZED HEALTH CARE EDUCATION/TRAINING PROGRAM

## 2023-09-19 PROCEDURE — 272N000001 HC OR GENERAL SUPPLY STERILE: Performed by: ORTHOPAEDIC SURGERY

## 2023-09-19 PROCEDURE — 250N000013 HC RX MED GY IP 250 OP 250 PS 637: Performed by: PHYSICIAN ASSISTANT

## 2023-09-19 PROCEDURE — P9035 PLATELET PHERES LEUKOREDUCED: HCPCS | Performed by: NURSE PRACTITIONER

## 2023-09-19 PROCEDURE — 250N000011 HC RX IP 250 OP 636: Mod: JZ | Performed by: ORTHOPAEDIC SURGERY

## 2023-09-19 PROCEDURE — 36415 COLL VENOUS BLD VENIPUNCTURE: CPT | Performed by: ORTHOPAEDIC SURGERY

## 2023-09-19 PROCEDURE — 250N000011 HC RX IP 250 OP 636: Mod: JZ | Performed by: NURSE ANESTHETIST, CERTIFIED REGISTERED

## 2023-09-19 PROCEDURE — 250N000009 HC RX 250: Performed by: STUDENT IN AN ORGANIZED HEALTH CARE EDUCATION/TRAINING PROGRAM

## 2023-09-19 PROCEDURE — 250N000011 HC RX IP 250 OP 636: Performed by: ANESTHESIOLOGY

## 2023-09-19 PROCEDURE — 250N000013 HC RX MED GY IP 250 OP 250 PS 637: Performed by: FAMILY MEDICINE

## 2023-09-19 PROCEDURE — 258N000003 HC RX IP 258 OP 636: Performed by: NURSE ANESTHETIST, CERTIFIED REGISTERED

## 2023-09-19 PROCEDURE — 250N000011 HC RX IP 250 OP 636: Mod: JZ | Performed by: HOSPITALIST

## 2023-09-19 PROCEDURE — 86850 RBC ANTIBODY SCREEN: CPT | Performed by: NURSE PRACTITIONER

## 2023-09-19 PROCEDURE — 250N000009 HC RX 250: Performed by: PHYSICIAN ASSISTANT

## 2023-09-19 PROCEDURE — C1776 JOINT DEVICE (IMPLANTABLE): HCPCS | Performed by: ORTHOPAEDIC SURGERY

## 2023-09-19 PROCEDURE — 258N000003 HC RX IP 258 OP 636: Performed by: ORTHOPAEDIC SURGERY

## 2023-09-19 PROCEDURE — 36415 COLL VENOUS BLD VENIPUNCTURE: CPT | Performed by: NURSE PRACTITIONER

## 2023-09-19 PROCEDURE — 370N000017 HC ANESTHESIA TECHNICAL FEE, PER MIN: Performed by: ORTHOPAEDIC SURGERY

## 2023-09-19 PROCEDURE — 86901 BLOOD TYPING SEROLOGIC RH(D): CPT | Performed by: NURSE PRACTITIONER

## 2023-09-19 PROCEDURE — 85027 COMPLETE CBC AUTOMATED: CPT | Mod: 91 | Performed by: ORTHOPAEDIC SURGERY

## 2023-09-19 PROCEDURE — 250N000011 HC RX IP 250 OP 636: Performed by: PHYSICIAN ASSISTANT

## 2023-09-19 PROCEDURE — 250N000025 HC SEVOFLURANE, PER MIN: Performed by: ORTHOPAEDIC SURGERY

## 2023-09-19 PROCEDURE — 258N000003 HC RX IP 258 OP 636: Performed by: STUDENT IN AN ORGANIZED HEALTH CARE EDUCATION/TRAINING PROGRAM

## 2023-09-19 PROCEDURE — 250N000013 HC RX MED GY IP 250 OP 250 PS 637: Performed by: ORTHOPAEDIC SURGERY

## 2023-09-19 PROCEDURE — 360N000077 HC SURGERY LEVEL 4, PER MIN: Performed by: ORTHOPAEDIC SURGERY

## 2023-09-19 DEVICE — IMPLANTABLE DEVICE
Type: IMPLANTABLE DEVICE | Site: KNEE | Status: FUNCTIONAL
Brand: PERSONA® NATURAL TIBIA®

## 2023-09-19 DEVICE — SIMPLEX® HV WITH GENTAMICIN IS A FAST-SETTING ACRYLIC RESIN WITH ADDITION OF GENTAMICIN SULFATE FOR USE IN BONE SURGERY. MIXING THE TWO SEPARATE STERILE COMPONENTS PRODUCES A DUCTILE BONE CEMENT WHICH, AFTER HARDENING, FIXES THE IMPLANT AND TRANSFERS STRESSES PRODUCED DURING MOVEMENT EVENLY TO THE BONE. SIMPLEX® HV WITH GENTAMICINN CEMENT POWDER ALSO CONTAINS INSOLUBLE ZIRCONIUM DIOXIDE AS AN X-RAY CONTRAST MEDIUM. SIMPLEX® HV WITH GENTAMICIN DOES NOT EMIT A SIGNAL AND DOES NOT POSE A SAFETY RISK IN A MAGNETIC RESONANCE ENVIRONMENT.
Type: IMPLANTABLE DEVICE | Site: KNEE | Status: FUNCTIONAL
Brand: SIMPLEX HV WITH GENTAMICIN

## 2023-09-19 DEVICE — IMPLANTABLE DEVICE
Type: IMPLANTABLE DEVICE | Site: KNEE | Status: FUNCTIONAL
Brand: PERSONA®

## 2023-09-19 DEVICE — IMPLANTABLE DEVICE
Type: IMPLANTABLE DEVICE | Site: KNEE | Status: FUNCTIONAL
Brand: PERSONA® VIVACIT-E®

## 2023-09-19 RX ORDER — ONDANSETRON 2 MG/ML
4 INJECTION INTRAMUSCULAR; INTRAVENOUS EVERY 30 MIN PRN
Status: DISCONTINUED | OUTPATIENT
Start: 2023-09-19 | End: 2023-09-19 | Stop reason: HOSPADM

## 2023-09-19 RX ORDER — SODIUM CHLORIDE 9 MG/ML
INJECTION, SOLUTION INTRAVENOUS CONTINUOUS PRN
Status: DISCONTINUED | OUTPATIENT
Start: 2023-09-19 | End: 2023-09-19

## 2023-09-19 RX ORDER — ONDANSETRON 4 MG/1
4 TABLET, ORALLY DISINTEGRATING ORAL EVERY 30 MIN PRN
Status: CANCELLED | OUTPATIENT
Start: 2023-09-19

## 2023-09-19 RX ORDER — CEFAZOLIN SODIUM/WATER 2 G/20 ML
2 SYRINGE (ML) INTRAVENOUS SEE ADMIN INSTRUCTIONS
Status: DISCONTINUED | OUTPATIENT
Start: 2023-09-19 | End: 2023-09-19 | Stop reason: HOSPADM

## 2023-09-19 RX ORDER — AMOXICILLIN 250 MG
1 CAPSULE ORAL 2 TIMES DAILY
Status: DISCONTINUED | OUTPATIENT
Start: 2023-09-19 | End: 2023-09-22 | Stop reason: HOSPADM

## 2023-09-19 RX ORDER — FENTANYL CITRATE 50 UG/ML
25 INJECTION, SOLUTION INTRAMUSCULAR; INTRAVENOUS EVERY 5 MIN PRN
Status: DISCONTINUED | OUTPATIENT
Start: 2023-09-19 | End: 2023-09-19 | Stop reason: HOSPADM

## 2023-09-19 RX ORDER — HYDROMORPHONE HYDROCHLORIDE 2 MG/1
4 TABLET ORAL EVERY 4 HOURS PRN
Status: DISCONTINUED | OUTPATIENT
Start: 2023-09-19 | End: 2023-09-20

## 2023-09-19 RX ORDER — LIDOCAINE 40 MG/G
CREAM TOPICAL
Status: DISCONTINUED | OUTPATIENT
Start: 2023-09-19 | End: 2023-09-22 | Stop reason: HOSPADM

## 2023-09-19 RX ORDER — ONDANSETRON 2 MG/ML
4 INJECTION INTRAMUSCULAR; INTRAVENOUS EVERY 30 MIN PRN
Status: CANCELLED | OUTPATIENT
Start: 2023-09-19

## 2023-09-19 RX ORDER — FUROSEMIDE 40 MG
40 TABLET ORAL EVERY MORNING
Status: DISCONTINUED | OUTPATIENT
Start: 2023-09-20 | End: 2023-09-22 | Stop reason: HOSPADM

## 2023-09-19 RX ORDER — NALOXONE HYDROCHLORIDE 0.4 MG/ML
0.2 INJECTION, SOLUTION INTRAMUSCULAR; INTRAVENOUS; SUBCUTANEOUS
Status: DISCONTINUED | OUTPATIENT
Start: 2023-09-19 | End: 2023-09-22 | Stop reason: HOSPADM

## 2023-09-19 RX ORDER — LACTULOSE 10 G/15ML
10 SOLUTION ORAL 3 TIMES DAILY PRN
Status: DISCONTINUED | OUTPATIENT
Start: 2023-09-19 | End: 2023-09-22 | Stop reason: HOSPADM

## 2023-09-19 RX ORDER — ONDANSETRON 2 MG/ML
INJECTION INTRAMUSCULAR; INTRAVENOUS PRN
Status: DISCONTINUED | OUTPATIENT
Start: 2023-09-19 | End: 2023-09-19

## 2023-09-19 RX ORDER — SODIUM CHLORIDE, SODIUM LACTATE, POTASSIUM CHLORIDE, CALCIUM CHLORIDE 600; 310; 30; 20 MG/100ML; MG/100ML; MG/100ML; MG/100ML
INJECTION, SOLUTION INTRAVENOUS CONTINUOUS
Status: DISCONTINUED | OUTPATIENT
Start: 2023-09-19 | End: 2023-09-20

## 2023-09-19 RX ORDER — ONDANSETRON 4 MG/1
4 TABLET, ORALLY DISINTEGRATING ORAL EVERY 6 HOURS PRN
Status: DISCONTINUED | OUTPATIENT
Start: 2023-09-19 | End: 2023-09-22 | Stop reason: HOSPADM

## 2023-09-19 RX ORDER — BISACODYL 10 MG
10 SUPPOSITORY, RECTAL RECTAL DAILY PRN
Status: DISCONTINUED | OUTPATIENT
Start: 2023-09-19 | End: 2023-09-22 | Stop reason: HOSPADM

## 2023-09-19 RX ORDER — DEXAMETHASONE SODIUM PHOSPHATE 10 MG/ML
INJECTION, SOLUTION INTRAMUSCULAR; INTRAVENOUS PRN
Status: DISCONTINUED | OUTPATIENT
Start: 2023-09-19 | End: 2023-09-19

## 2023-09-19 RX ORDER — HYDROMORPHONE HYDROCHLORIDE 2 MG/1
2-4 TABLET ORAL EVERY 4 HOURS PRN
Qty: 30 TABLET | Refills: 0 | Status: SHIPPED | OUTPATIENT
Start: 2023-09-19 | End: 2023-09-22

## 2023-09-19 RX ORDER — FENTANYL CITRATE 50 UG/ML
100 INJECTION, SOLUTION INTRAMUSCULAR; INTRAVENOUS
Status: COMPLETED | OUTPATIENT
Start: 2023-09-19 | End: 2023-09-19

## 2023-09-19 RX ORDER — TRANEXAMIC ACID 650 MG/1
1950 TABLET ORAL ONCE
Status: COMPLETED | OUTPATIENT
Start: 2023-09-19 | End: 2023-09-19

## 2023-09-19 RX ORDER — FUROSEMIDE 20 MG
20 TABLET ORAL EVERY EVENING
Status: DISCONTINUED | OUTPATIENT
Start: 2023-09-20 | End: 2023-09-22 | Stop reason: HOSPADM

## 2023-09-19 RX ORDER — SODIUM CHLORIDE, SODIUM LACTATE, POTASSIUM CHLORIDE, CALCIUM CHLORIDE 600; 310; 30; 20 MG/100ML; MG/100ML; MG/100ML; MG/100ML
INJECTION, SOLUTION INTRAVENOUS CONTINUOUS
Status: DISCONTINUED | OUTPATIENT
Start: 2023-09-19 | End: 2023-09-19 | Stop reason: HOSPADM

## 2023-09-19 RX ORDER — LIDOCAINE 40 MG/G
CREAM TOPICAL
Status: DISCONTINUED | OUTPATIENT
Start: 2023-09-19 | End: 2023-09-19 | Stop reason: HOSPADM

## 2023-09-19 RX ORDER — ASCORBIC ACID 125 MG
200 TABLET,CHEWABLE ORAL DAILY
Status: DISCONTINUED | OUTPATIENT
Start: 2023-09-19 | End: 2023-09-22 | Stop reason: HOSPADM

## 2023-09-19 RX ORDER — MULTIVITAMIN,THERAPEUTIC
1 TABLET ORAL DAILY
Status: DISCONTINUED | OUTPATIENT
Start: 2023-09-20 | End: 2023-09-22 | Stop reason: HOSPADM

## 2023-09-19 RX ORDER — ONDANSETRON 2 MG/ML
4 INJECTION INTRAMUSCULAR; INTRAVENOUS EVERY 6 HOURS PRN
Status: DISCONTINUED | OUTPATIENT
Start: 2023-09-19 | End: 2023-09-22 | Stop reason: HOSPADM

## 2023-09-19 RX ORDER — NALOXONE HYDROCHLORIDE 0.4 MG/ML
0.4 INJECTION, SOLUTION INTRAMUSCULAR; INTRAVENOUS; SUBCUTANEOUS
Status: DISCONTINUED | OUTPATIENT
Start: 2023-09-19 | End: 2023-09-22 | Stop reason: HOSPADM

## 2023-09-19 RX ORDER — FENTANYL CITRATE 50 UG/ML
50 INJECTION, SOLUTION INTRAMUSCULAR; INTRAVENOUS EVERY 5 MIN PRN
Status: DISCONTINUED | OUTPATIENT
Start: 2023-09-19 | End: 2023-09-19 | Stop reason: HOSPADM

## 2023-09-19 RX ORDER — HYDRALAZINE HYDROCHLORIDE 20 MG/ML
10 INJECTION INTRAMUSCULAR; INTRAVENOUS EVERY 6 HOURS PRN
Status: DISCONTINUED | OUTPATIENT
Start: 2023-09-19 | End: 2023-09-22 | Stop reason: HOSPADM

## 2023-09-19 RX ORDER — HYDROMORPHONE HCL IN WATER/PF 6 MG/30 ML
0.4 PATIENT CONTROLLED ANALGESIA SYRINGE INTRAVENOUS
Status: DISCONTINUED | OUTPATIENT
Start: 2023-09-19 | End: 2023-09-20

## 2023-09-19 RX ORDER — SERTRALINE HYDROCHLORIDE 25 MG/1
25 TABLET, FILM COATED ORAL DAILY
Status: DISCONTINUED | OUTPATIENT
Start: 2023-09-19 | End: 2023-09-22 | Stop reason: HOSPADM

## 2023-09-19 RX ORDER — BUPIVACAINE HYDROCHLORIDE 5 MG/ML
INJECTION, SOLUTION EPIDURAL; INTRACAUDAL PRN
Status: DISCONTINUED | OUTPATIENT
Start: 2023-09-19 | End: 2023-09-19

## 2023-09-19 RX ORDER — PROCHLORPERAZINE MALEATE 10 MG
10 TABLET ORAL EVERY 6 HOURS PRN
Status: DISCONTINUED | OUTPATIENT
Start: 2023-09-19 | End: 2023-09-22 | Stop reason: HOSPADM

## 2023-09-19 RX ORDER — CEFAZOLIN SODIUM 2 G/100ML
2 INJECTION, SOLUTION INTRAVENOUS EVERY 8 HOURS
Status: COMPLETED | OUTPATIENT
Start: 2023-09-19 | End: 2023-09-20

## 2023-09-19 RX ORDER — HYDROMORPHONE HYDROCHLORIDE 2 MG/1
2 TABLET ORAL EVERY 4 HOURS PRN
Status: DISCONTINUED | OUTPATIENT
Start: 2023-09-19 | End: 2023-09-20

## 2023-09-19 RX ORDER — HYDROXYZINE HYDROCHLORIDE 25 MG/1
25 TABLET, FILM COATED ORAL EVERY 6 HOURS PRN
Status: DISCONTINUED | OUTPATIENT
Start: 2023-09-19 | End: 2023-09-21

## 2023-09-19 RX ORDER — HYDROMORPHONE HCL IN WATER/PF 6 MG/30 ML
0.2 PATIENT CONTROLLED ANALGESIA SYRINGE INTRAVENOUS EVERY 5 MIN PRN
Status: DISCONTINUED | OUTPATIENT
Start: 2023-09-19 | End: 2023-09-19 | Stop reason: HOSPADM

## 2023-09-19 RX ORDER — KETAMINE HYDROCHLORIDE 10 MG/ML
INJECTION INTRAMUSCULAR; INTRAVENOUS PRN
Status: DISCONTINUED | OUTPATIENT
Start: 2023-09-19 | End: 2023-09-19

## 2023-09-19 RX ORDER — ONDANSETRON 4 MG/1
4 TABLET, ORALLY DISINTEGRATING ORAL EVERY 30 MIN PRN
Status: DISCONTINUED | OUTPATIENT
Start: 2023-09-19 | End: 2023-09-19 | Stop reason: HOSPADM

## 2023-09-19 RX ORDER — PROPOFOL 10 MG/ML
INJECTION, EMULSION INTRAVENOUS PRN
Status: DISCONTINUED | OUTPATIENT
Start: 2023-09-19 | End: 2023-09-19

## 2023-09-19 RX ORDER — HYDROMORPHONE HCL IN WATER/PF 6 MG/30 ML
0.4 PATIENT CONTROLLED ANALGESIA SYRINGE INTRAVENOUS EVERY 5 MIN PRN
Status: DISCONTINUED | OUTPATIENT
Start: 2023-09-19 | End: 2023-09-19 | Stop reason: HOSPADM

## 2023-09-19 RX ORDER — CEFAZOLIN SODIUM/WATER 2 G/20 ML
2 SYRINGE (ML) INTRAVENOUS
Status: COMPLETED | OUTPATIENT
Start: 2023-09-19 | End: 2023-09-19

## 2023-09-19 RX ORDER — POTASSIUM CHLORIDE 1500 MG/1
40 TABLET, EXTENDED RELEASE ORAL DAILY
Status: DISCONTINUED | OUTPATIENT
Start: 2023-09-20 | End: 2023-09-22 | Stop reason: HOSPADM

## 2023-09-19 RX ORDER — HYDROMORPHONE HCL IN WATER/PF 6 MG/30 ML
0.2 PATIENT CONTROLLED ANALGESIA SYRINGE INTRAVENOUS
Status: DISCONTINUED | OUTPATIENT
Start: 2023-09-19 | End: 2023-09-22 | Stop reason: HOSPADM

## 2023-09-19 RX ORDER — AMOXICILLIN 250 MG
1-2 CAPSULE ORAL 2 TIMES DAILY
Qty: 30 TABLET | Refills: 0 | Status: SHIPPED | OUTPATIENT
Start: 2023-09-19 | End: 2024-04-03

## 2023-09-19 RX ORDER — EPHEDRINE SULFATE 50 MG/ML
INJECTION, SOLUTION INTRAMUSCULAR; INTRAVENOUS; SUBCUTANEOUS PRN
Status: DISCONTINUED | OUTPATIENT
Start: 2023-09-19 | End: 2023-09-19

## 2023-09-19 RX ORDER — POLYETHYLENE GLYCOL 3350 17 G/17G
17 POWDER, FOR SOLUTION ORAL DAILY
Status: DISCONTINUED | OUTPATIENT
Start: 2023-09-20 | End: 2023-09-22 | Stop reason: HOSPADM

## 2023-09-19 RX ORDER — VITAMIN B COMPLEX
50 TABLET ORAL DAILY
Status: DISCONTINUED | OUTPATIENT
Start: 2023-09-20 | End: 2023-09-22 | Stop reason: HOSPADM

## 2023-09-19 RX ADMIN — Medication 2 G: at 12:45

## 2023-09-19 RX ADMIN — Medication 10 MG: at 13:45

## 2023-09-19 RX ADMIN — SENNOSIDES AND DOCUSATE SODIUM 1 TABLET: 50; 8.6 TABLET ORAL at 19:46

## 2023-09-19 RX ADMIN — MIDAZOLAM 2 MG: 1 INJECTION INTRAMUSCULAR; INTRAVENOUS at 12:45

## 2023-09-19 RX ADMIN — HYDRALAZINE HYDROCHLORIDE 10 MG: 20 INJECTION, SOLUTION INTRAMUSCULAR; INTRAVENOUS at 19:56

## 2023-09-19 RX ADMIN — HYDROMORPHONE HYDROCHLORIDE 0.5 MG: 1 INJECTION, SOLUTION INTRAMUSCULAR; INTRAVENOUS; SUBCUTANEOUS at 13:11

## 2023-09-19 RX ADMIN — HYDROMORPHONE HYDROCHLORIDE 4 MG: 2 TABLET ORAL at 19:46

## 2023-09-19 RX ADMIN — SODIUM CHLORIDE, POTASSIUM CHLORIDE, SODIUM LACTATE AND CALCIUM CHLORIDE: 600; 310; 30; 20 INJECTION, SOLUTION INTRAVENOUS at 17:49

## 2023-09-19 RX ADMIN — KETAMINE HYDROCHLORIDE 40 MG: 10 INJECTION INTRAMUSCULAR; INTRAVENOUS at 13:19

## 2023-09-19 RX ADMIN — SODIUM CHLORIDE: 0.9 INJECTION, SOLUTION INTRAVENOUS at 12:45

## 2023-09-19 RX ADMIN — TRANEXAMIC ACID 1950 MG: 650 TABLET ORAL at 09:49

## 2023-09-19 RX ADMIN — PROPOFOL 200 MG: 10 INJECTION, EMULSION INTRAVENOUS at 12:53

## 2023-09-19 RX ADMIN — BUPIVACAINE HYDROCHLORIDE 15 ML: 5 INJECTION, SOLUTION EPIDURAL; INTRACAUDAL; PERINEURAL at 12:53

## 2023-09-19 RX ADMIN — SODIUM CHLORIDE, POTASSIUM CHLORIDE, SODIUM LACTATE AND CALCIUM CHLORIDE: 600; 310; 30; 20 INJECTION, SOLUTION INTRAVENOUS at 13:00

## 2023-09-19 RX ADMIN — DEXAMETHASONE SODIUM PHOSPHATE 10 MG: 10 INJECTION, SOLUTION INTRAMUSCULAR; INTRAVENOUS at 13:00

## 2023-09-19 RX ADMIN — CEFAZOLIN SODIUM 2 G: 2 INJECTION, SOLUTION INTRAVENOUS at 20:00

## 2023-09-19 RX ADMIN — ONDANSETRON 4 MG: 2 INJECTION INTRAMUSCULAR; INTRAVENOUS at 13:00

## 2023-09-19 RX ADMIN — SERTRALINE HYDROCHLORIDE 25 MG: 25 TABLET, FILM COATED ORAL at 18:54

## 2023-09-19 RX ADMIN — PHENYLEPHRINE HYDROCHLORIDE 0.3 MCG/KG/MIN: 10 INJECTION INTRAVENOUS at 13:35

## 2023-09-19 RX ADMIN — LIDOCAINE HYDROCHLORIDE 20 MG: 10 INJECTION, SOLUTION EPIDURAL; INFILTRATION; INTRACAUDAL; PERINEURAL at 12:53

## 2023-09-19 RX ADMIN — HYDROMORPHONE HYDROCHLORIDE 0.5 MG: 1 INJECTION, SOLUTION INTRAMUSCULAR; INTRAVENOUS; SUBCUTANEOUS at 13:15

## 2023-09-19 RX ADMIN — FENTANYL CITRATE 100 MCG: 50 INJECTION, SOLUTION INTRAMUSCULAR; INTRAVENOUS at 12:53

## 2023-09-19 ASSESSMENT — ACTIVITIES OF DAILY LIVING (ADL)
ADLS_ACUITY_SCORE: 20
ADLS_ACUITY_SCORE: 22
ADLS_ACUITY_SCORE: 20
ADLS_ACUITY_SCORE: 20
ADLS_ACUITY_SCORE: 22

## 2023-09-19 NOTE — ANESTHESIA PROCEDURE NOTES
Adductor canal Procedure Note    Pre-Procedure   Staff -        Anesthesiologist:  Narinder Taylor MD       Performed By: anesthesiologist       Location: pre-op       Procedure Start/Stop Times: 9/19/2023 12:55 PM and 9/19/2023 12:55 PM       Pre-Anesthestic Checklist: patient identified, IV checked, site marked, risks and benefits discussed, informed consent, monitors and equipment checked, pre-op evaluation, at physician/surgeon's request and post-op pain management  Timeout:       Correct Patient: Yes        Correct Procedure: Yes        Correct Site: Yes        Correct Position: Yes        Correct Laterality: Yes        Site Marked: Yes  Procedure Documentation  Procedure: Adductor canal       Laterality: left       Patient Position: supine       Skin prep: Chloraprep       Local skin infiltrated with 3 mL of 1% lidocaine.        Needle Type: other       Needle Gauge: 20.        Needle Length (Inches): 6        Ultrasound guided       1. Ultrasound was used to identify targeted nerve, plexus, vascular marker, or fascial plane and place a needle adjacent to it in real-time.       2. Ultrasound was used to visualize the spread of anesthetic in close proximity to the above referenced structure.       3. A permanent image is entered into the patient's record.       4. The visualized anatomic structures appeared normal.       5. There were no apparent abnormal pathologic findings.    Assessment/Narrative         The placement was negative for: blood aspirated, painful injection and site bleeding       Paresthesias: No.       Test dose of 3 mL at.         Test dose negative, 3 minutes after injection, for signs of intravascular, subdural, or intrathecal injection.       Bolus given via needle. no blood aspirated via catheter.        Secured via.        Insertion/Infusion Method: Single Shot       Complications: none       Injection made incrementally with aspirations every 5 mL.    Medication(s) Administered  "  Medication Administration Time: 9/19/2023 12:55 PM      FOR [facility]) ONLY:   Pain Team Contact information: please page the Pain Team Via [employer]. Search \"Pain\". During daytime hours, please page the attending first. At night please page the resident first.      "

## 2023-09-19 NOTE — CONSULTS
[facility] MEDICINE CONSULT NOTE   Physician requesting consult: José Miguel Landaverde MD    Reason for consult: Postoperative medical management of medical co-morbidities as below    Identification/Summary:   Ivan Bell is a 58 year old male with a PMH of alcoholic cirrhosis sober since 2015, HTN, CKD 2, thrombocytopenia, hypertriglyceridemia, depression, gout.  Underwent left total knee arthroplasty.     Assessment and Plan:    Status post left total knee arthroplasty  Postoperative management per orthopedics.  Alcoholic cirrhosis with ascites  History of TIPS procedure  Sober from alcohol since 2015.  States has been taking his medications regularly.  Order home Lasix 40 mg a.m. 20 mg p.m.  Resume doses on 9/20.  Orders potassium 40 mill equivalents daily.  Order lactulose as needed constipation.  Depression  Order Zoloft.  Thrombocytopenia  Preoperative platelets 55.  Follow platelet in the a.m.  Chronic kidney disease stage II  Recheck BMP in the morning.    Anticoagulation.  Elevated INR  Preop INR up to 1.7.  Anticoagulation per surgery.  Hold his vitamin K at this time.    Fluids: Per surgery  Pain meds: Per surgery  Therapy: Per surgery  Blue:Not present  Lines: None       Current Diet  Orders Placed This Encounter      Discharge Instruction - Regular Diet Adult    Supplements  None      Disposition  Per surgery    Code status:Prior   [facility] service was asked to evaluate patient for postoperative medical management as follows below. Please resume the home medications as reconciled and further noted with ordered hold parameters.  Thank you for this consult; we will continue to follow this patient until discharge.    Procedure(s):  LEFT TOTAL KNEE ARTHROPLASTY  Day of Surgery  Estimated Blood Loss:  * No values recorded between 9/19/2023  1:15 PM and 9/19/2023  2:37 PM *  Hospital Problem List   No problem-specific Assessment & Plan notes found for this encounter.    Principal  "Problem:    Knee osteoarthritis      -Reviewed the patient's preoperative H and P and updated missing elements.  -Home medication reconciliation has been reviewed.  Medications have been ordered as noted from the home list and changes are documented above     Clinically Significant Risk Factors Present on Admission                 # Coagulation Defect: INR = 1.70 (Ref range: 0.85 - 1.15) and/or PTT = N/A, will monitor for bleeding    # Thrombocytopenia: Lowest platelets = 55 in last 2 days, will monitor for bleeding     # Hypertension: Noted on problem list      # Overweight: Estimated body mass index is 26.08 kg/m  as calculated from the following:    Height as of this encounter: 1.803 m (5' 11\").    Weight as of this encounter: 84.8 kg (187 lb).              HISTORY     Ivan Bell is a 58 year old male with PMH of alcoholic cirrhosis sober since 2015, HTN, CKD 2, thrombocytopenia, hypertriglyceridemia, depression, gout.  Underwent left total knee arthroplasty..  Doubly doing well.  Just having some fatigue.  Pain under good control.  No chest pain.  No shortness of breath.  No nausea or vomiting.  Patient states has been sober from alcohol since 2015.  Does take his medications regularly.  Denies personal history of heart attack, stroke, cancer, diabetes, DVT, PE, peptic ulcer disease or sleep apnea.  Platelets were low at 55 and INR was elevated at 1.7.  Patient takes his lactulose just as needed for constipation..  Questions answered to verbalized satisfaction.    Past Medical History     Past Medical History:  No date: Alcoholic cirrhosis of liver (H)  03/2019: Alcoholic hepatitis  No date: Chronic kidney disease      Comment:  CRF  No date: Depression  No date: Gout  No date: History of transfusion  No date: Hypertension  No date: Hypertriglyceridemia  01/09/2006: Left calcaneus fracture      Comment:  January 16, 2006: Fell 10 feet from ladder onto left                foot on frozen ground on 1/9/06 at " home.  Immediate pain                and unable to walk- seen at [place] and diagnosed with                calcaneus fracture  No date: Nephrolithiasis  No date: Osteoarthritis  No date: Portal vein thrombosis      Comment:  left occlusion, partial main  No date: Thrombocytopenia (H)     Patient Active Problem List    Diagnosis Date Noted    Knee osteoarthritis 09/19/2023     Priority: Medium    Alcohol use disorder, moderate, in sustained remission (H) 03/27/2023     Priority: Medium    Cervicalgia 04/13/2021     Priority: Medium    Thrombocytopenia (H) 06/05/2020     Priority: Medium    CKD (chronic kidney disease) stage 2, GFR 60-89 ml/min 04/17/2020     Priority: Medium    Seasonal allergic rhinitis, unspecified trigger 04/17/2020     Priority: Medium    Nephrolithiasis 09/23/2018     Priority: Medium     September 23, 2018 non-obstructing, incident finding on us.       Alcoholic cirrhosis of liver with ascites (H) 03/08/2016     Priority: Medium     September 23, 2018 now sober, ascites resolved, S/P TIP procedure 6/2018. Normal liver enzymes, diminished liver function.  INR 1.6, bilirubin 2.5, albumin 2.6. He  appears healthy. Doing well. Stressed the importance of abstaining from alcohol. See copy of recent us.     9/10/18:  Impression:   1. Patent TIPS with appropriate in stent velocities. Normal Doppler  evaluation of the remainder of the hepatic vasculature in the setting  of TIPS.  2. Cirrhotic hepatic morphology with evidence of portal hypertension,  including splenomegaly. No focal hepatic mass.  3. Cholelithiasis without evidence of cholecystitis.  4. Bilateral nonobstructing nephrolithiasis.           Hypertension goal BP (blood pressure) < 140/90 12/18/2012     Priority: Medium    CARDIOVASCULAR SCREENING; LDL GOAL LESS THAN 130 10/31/2010     Priority: Medium    Erectile dysfunction 01/29/2008     Priority: Medium     September 23, 2018 no known CAD, no contraindications to sildenafil.       Gouty  arthropathy 12/31/2006     Priority: Medium     Problem list name updated by automated process. Provider to review and confirm  [employer] Update utility      Hypertriglyceridemia 08/03/2005     Priority: Medium     Last Exam: December 19, 2007  Last Lipids: CHOL      211   01/25/2007 LDL      104   01/25/2007 HDL       83   01/25/2007  Last LFTs:: AST      106   01/25/2007   ALT       53   01/25/2007    TRIG      120   01/25/2007  TRIG      115   01/16/2006  Meds: Lopid 600 bid - tolerating       Surgical History     Past Surgical History:   Procedure Laterality Date    ANKLE SURGERY Left     ANKLE SURGERY      ARTHROSCOPY KNEE      COLONOSCOPY N/A 03/31/2016    Procedure: COLONOSCOPY;  Surgeon: Rhys Uriostegui MD;  Location: [facility]    COMBINED CYSTOSCOPY, RETROGRADES, URETEROSCOPY, LASER HOLMIUM LITHOTRIPSY URETER(S), INSERT STENT Bilateral 7/1/2022    Procedure: CYSTOSCOPY, RIGHT RETROGRADE PYELOGRAM, RIGHT URETEROSCOPY WITH LASER LITHOTRIPSY AND BASKET REMOVAL OF STONE, RIGHT URETERAL STENT PLACEMENT, LEFT RETROGRADE PYELOGRAM, LEFT URETEROSCOPY WITH LASER LITHOTRIPSY AND BASKET REMOVAL OF STONE, LEFT URETERAL STENT PLACEMENT;  Surgeon: Dylan Galaviz MD;  Location:  OR    COMBINED CYSTOSCOPY, RETROGRADES, URETEROSCOPY, LASER HOLMIUM LITHOTRIPSY URETER(S), INSERT STENT Bilateral 7/27/2022    Procedure: CYSTOSCOPY, BILATERAL URETERAL STENT REMOVAL, BILATERAL  RETROGRADE PYELOGRAM, BILATERAL URETEROSCOPY. HOLMIUM LASER LITHOTRIPSY LEFT SIDE.  AND BASKET REMOVAL OF STONES BILATERAL, LEFT  URETERAL STENT PLACEMENT;  Surgeon: Dylan Galaviz MD;  Location:  OR    COMBINED CYSTOSCOPY, RETROGRADES, URETEROSCOPY, LASER HOLMIUM LITHOTRIPSY URETER(S), INSERT STENT Left 4/24/2023    Procedure: CYSTOSCOPY, LEFT RETROGRADE PYELOGRAM, LEFT URETEROSCOPY WITH LASER LITHOTRIOPSY AND BASKET REMOVAL OF STONES, LEFT URETERAL STENT PLACEMENT;  Surgeon: Dylan Galaviz MD;  Location:  OR    COMBINED CYSTOSCOPY,  RETROGRADES, URETEROSCOPY, LASER HOLMIUM LITHOTRIPSY URETER(S), INSERT STENT Left 5/10/2023    Procedure: CYSTOSCOPY, LEFT URETERAL STENT REMOVAL, LEFT RETROGRADE PYELOGRAM, LEFT URETEROSCOPY WITH LASER LITHOTRIOPSY AND BASKET REMOVAL OF STONES, LEFT URETERAL STENT PLACEMENT;  Surgeon: Dylan Galaviz MD;  Location:  OR    ESOPHAGOSCOPY, GASTROSCOPY, DUODENOSCOPY (EGD), COMBINED N/A 03/31/2016    Procedure: COMBINED ESOPHAGOSCOPY, GASTROSCOPY, DUODENOSCOPY (EGD);  Surgeon: Rhys Uriostegui MD;  Location:  GI    ESOPHAGOSCOPY, GASTROSCOPY, DUODENOSCOPY (EGD), COMBINED N/A 03/09/2018    Procedure: COMBINED ESOPHAGOSCOPY, GASTROSCOPY, DUODENOSCOPY (EGD), BIOPSY SINGLE OR MULTIPLE;  EGD;  Surgeon: Gonzalo Wahl MD;  Location:  GI    ESOPHAGOSCOPY, GASTROSCOPY, DUODENOSCOPY (EGD), COMBINED N/A 06/07/2019    Procedure: ESOPHAGOGASTRODUODENOSCOPY (EGD);  Surgeon: Gonzalo Wahl MD;  Location:  GI    FOOT SURGERY      IR PARACENTESIS  10/29/2019    IR PARACENTESIS  11/25/2020    IR PARACENTESIS  08/11/2021    IR PARACENTESIS  01/28/2022    IR PARACENTESIS  03/30/2022    IR TRANSVEN INTRAHEPATIC PORTOSYST REV  10/29/2019    IR TRANSVEN INTRAHEPATIC PORTOSYST REV  11/25/2020    IR TRANSVEN INTRAHEPATIC PORTOSYST REV  08/11/2021    IR TRANSVEN INTRAHEPATIC PORTOSYST REV  01/28/2022    IR TRANSVEN INTRAHEPATIC PORTOSYST REV  03/30/2022    KNEE SURGERY Left     KNEE SURGERY Right     RELEASE CARPAL TUNNEL      RELEASE TRIGGER FINGER Right 06/11/2020    Procedure: RELEASE, TRIGGER FINGER, right ring and long finger;  Surgeon: Pascual Valencia MD;  Location: [facility] OR    SIGMOIDOSCOPY FLEXIBLE N/A 10/31/2017    Procedure: SIGMOIDOSCOPY FLEXIBLE;;  Surgeon: Armaan Adams MD;  Location:  GI    TIPS Procedure  06/06/2018    TIPS PROCEDURE  11/01/2020    ZZC PLASTY KNEE,MED OR LAT COMPARTMT Right 02/19/2021    Procedure: RIGHT UNICOMPARTMENTAL ARTHROPLASTY KNEE MEDIAL;  Surgeon: Saima  José Miguel JOSEPH MD;  Location: [facility] OR;  Service: Orthopedics     Family History      Family History   Problem Relation Age of Onset    Family History Negative Mother     Family History Negative Father     Hypertension Father     Cerebrovascular Disease Father 87    Breast Cancer Maternal Grandmother     Substance Abuse Brother     Rheumatoid Arthritis Daughter     Depression Daughter     Cancer - colorectal No family hx of     Prostate Cancer No family hx of     Liver Disease No family hx of       Social History      Social History     Tobacco Use    Smoking status: Former     Packs/day: 0.00     Types: Dip, chew, snus or snuff, Cigarettes     Quit date: 1998     Years since quittin.0     Passive exposure: Never    Smokeless tobacco: Former     Types: Chew    Tobacco comments:     1 tin per 10 days.   Vaping Use    Vaping Use: Never used   Substance Use Topics    Alcohol use: No     Comment: sober since     Drug use: No      Allergies     Allergies   Allergen Reactions    Trazodone Visual Disturbance    Oxycodone Other (See Comments)     Delirium and constipation  Delirium and constipation       Prior to Admission Medications      Prior to Admission Medications   Prescriptions Last Dose Informant Patient Reported? Taking?   Ascorbic Acid (VITAMIN C PO) 2023 Self Yes Yes   Sig: Take 500 mg by mouth daily   MULTIPLE VITAMINS PO 2023 Self Yes Yes   Sig: Take 1 tablet by mouth daily   Menaquinone-7 (VITAMIN K2) 100 MCG CAPS 2023 Self Yes Yes   Sig: Take 200 mcg by mouth daily    acetaminophen (TYLENOL) 500 MG tablet Past Week Self Yes Yes   Sig: Take 1,000 mg by mouth every 6 hours as needed for mild pain    docusate sodium (COLACE) 100 MG tablet 2023  No Yes   Sig: Take 1 tablet (100 mg) by mouth daily   furosemide (LASIX) 20 MG tablet 2023 at pm  No Yes   Sig: Take 1 tablet (20 mg) by mouth every evening   furosemide (LASIX) 40 MG tablet 2023 at am  No Yes   Sig:  Take 1 tablet (40 mg) by mouth every morning   lactulose (CHRONULAC) 10 GM/15ML solution prn  No Yes   Sig: TAKE 30MLS BY MOUTH THREE TIMES DAILY AS NEEDED FOR CONSTIPATION (TAKE AS NEEDED TO MAINTAIN 3 TO 4 BOWEL MOVEMENTS DAILY)   potassium chloride ER (KLOR-CON M) 20 MEQ CR tablet 9/18/2023 at am  No Yes   Sig: Take 2 tablets (40 mEq) by mouth daily   sertraline (ZOLOFT) 25 MG tablet 9/18/2023 at am  No Yes   Sig: Take 1 tablet (25 mg) by mouth daily   sildenafil (REVATIO) 20 MG tablet Past Month  No Yes   Sig: Take 3-5 tabs 1/2-4 hours to relations   vitamin D3 (CHOLECALCIFEROL) 2000 units (50 mcg) tablet 9/12/2023 Self Yes Yes   Sig: Take 1 tablet by mouth daily      Facility-Administered Medications: None      Review of Systems     A 12 point comprehensive review of systems was negative except as noted above in HPI.    OBJECTIVE         Physical Exam   Temp:  [97.9  F (36.6  C)-99.8  F (37.7  C)] 97.9  F (36.6  C)  Pulse:  [69-99] 93  Resp:  [12-25] 15  BP: (137-180)/(66-83) 159/76  SpO2:  [93 %-100 %] 93 %  Body mass index is 26.08 kg/m .  Constitutional: awake, alert, cooperative, no apparent distress, and appears stated age and fatigued  Eyes: Lids and lashes normal, pupils equal, round and reactive to light, extra ocular muscles intact, sclera clear, conjunctiva normal  ENT: Normocephalic, without obvious abnormality, atraumatic, sinuses nontender on palpation, external ears without lesions, oral pharynx with moist mucous membranes, tonsils without erythema or exudates, gums normal and good dentition.  Hematologic / Lymphatic: no cervical lymphadenopathy and no supraclavicular lymphadenopathy  Respiratory: No increased work of breathing, good air exchange, clear to auscultation bilaterally, no crackles or wheezing  Cardiovascular: Normal apical impulse, regular rate and rhythm, normal S1 and S2, no S3 or S4, and no murmur noted  GI: No scars, normal bowel sounds, soft, non-distended, non-tender, no masses  "palpated, no hepatosplenomegally  Skin: normal skin color, texture, turgor, no redness, warmth, or swelling, and no rashes  Musculoskeletal: There is no redness, warmth, or swelling of the joints.  Full range of motion noted.  Motor strength is 5 out of 5 all extremities bilaterally.  Tone is normal. no lower extremity pitting edema present  Neurologic: Cranial nerves II-XII are grossly intact. Sensory:  Sensory intact  Neuropsychiatric: General: normal, calm, and normal eye contact Level of consciousness: alert / normal Affect: normal Orientation: oriented to self, place, time and situation Memory and insight: normal, memory for past and recent events intact, and thought process normal      Imaging Reviewed Personally By Myself      Radiology Results:   Recent Results (from the past 24 hour(s))   POC US Guidance Needle Placement    Narrative    Ultrasound was performed as guidance to an anesthesia procedure.  Click   \"PACS images\" hyperlink below to view any stored images.  For specific   procedure details, view procedure note authored by anesthesia.       Labs Reviewed Personally By Myself     Results for orders placed or performed during the hospital encounter of 09/19/23 (from the past 24 hour(s))   POC US Guidance Needle Placement    Narrative    Ultrasound was performed as guidance to an anesthesia procedure.  Click   \"PACS images\" hyperlink below to view any stored images.  For specific   procedure details, view procedure note authored by anesthesia.   ABO/Rh type and screen    Narrative    The following orders were created for panel order ABO/Rh type and screen.  Procedure                               Abnormality         Status                     ---------                               -----------         ------                     Adult Type and Screen[[id number]]                            Final result                 Please view results for these tests on the individual orders.   INR - Pre-Op   Result " Value Ref Range    INR 1.70 (H) 0.85 - 1.15   CBC with platelets   Result Value Ref Range    WBC Count 3.1 (L) 4.0 - 11.0 10e3/uL    RBC Count 3.45 (L) 4.40 - 5.90 10e6/uL    Hemoglobin 10.9 (L) 13.3 - 17.7 g/dL    Hematocrit 32.4 (L) 40.0 - 53.0 %    MCV 94 78 - 100 fL    MCH 31.6 26.5 - 33.0 pg    MCHC 33.6 31.5 - 36.5 g/dL    RDW 16.1 (H) 10.0 - 15.0 %    Platelet Count 55 (L) 150 - 450 10e3/uL   Adult Type and Screen   Result Value Ref Range    ABO/RH(D) A NEG     Antibody Screen Negative Negative    SPECIMEN EXPIRATION DATE [id number]    Prepare plasma (unit)   Result Value Ref Range    Blood Component Type Plasma     Product Code [id number]     Unit Status Transfused     Unit Number [id number]     CODING SYSTEM [id number]     ISSUE DATE AND TIME [id number]     UNIT ABO/RH A-     UNIT TYPE ISBT 0600      *Note: Due to a large number of results and/or encounters for the requested time period, some results have not been displayed. A complete set of results can be found in Results Review.       Preoperative Labs Reviewed Personally By Myself         Thank you for this consultation.  Appreciate the opportunity to participate in the care of Ivan Wuor, please feel free to contact us for any questions or concerns.    Tommy Foreman MD  [facility]  Phone: #[phone]

## 2023-09-19 NOTE — ANESTHESIA PROCEDURE NOTES
Airway       Patient location during procedure: OR  Staff -        CRNA: Toro Yanez APRN CRNA       Performed By: CRNA  Consent for Airway        Urgency: elective  Indications and Patient Condition       Indications for airway management: tea-procedural       Induction type:intravenous       Mask difficulty assessment: 1 - vent by mask    Final Airway Details       Final airway type: supraglottic airway    Supraglottic Airway Details        Type: LMA       Brand: Ambu AuraGain       LMA size: 5    Post intubation assessment        Placement verified by: capnometry, equal breath sounds and chest rise        Number of attempts at approach: 1       Number of other approaches attempted: 0       Secured with: silk tape       Ease of procedure: easy       Dentition: Unchanged

## 2023-09-19 NOTE — ANESTHESIA POSTPROCEDURE EVALUATION
Patient: Ivan Bell    Procedure: Procedure(s):  LEFT TOTAL KNEE ARTHROPLASTY       Anesthesia Type:  General    Note:  Disposition: Outpatient   Postop Pain Control: Uneventful            Sign Out: Well controlled pain   PONV: No   Neuro/Psych: Uneventful            Sign Out: Acceptable/Baseline neuro status   Airway/Respiratory: Uneventful            Sign Out: Acceptable/Baseline resp. status   CV/Hemodynamics: Uneventful            Sign Out: Acceptable CV status; No obvious hypovolemia; No obvious fluid overload   Other NRE: NONE   DID A NON-ROUTINE EVENT OCCUR? No       Last vitals:  Vitals Value Taken Time   /76 09/19/23 1520   Temp 36.6  C (97.9  F) 09/19/23 1510   Pulse 87 09/19/23 1549   Resp 17 09/19/23 1525   SpO2 99 % 09/19/23 1549   Vitals shown include unvalidated device data.    Electronically Signed By: Kg Arriaga DO  September 19, 2023  3:50 PM

## 2023-09-19 NOTE — ANESTHESIA CARE TRANSFER NOTE
Patient: Ivan Bell    Procedure: Procedure(s):  LEFT TOTAL KNEE ARTHROPLASTY       Diagnosis: Osteoarthritis of left knee [M17.12]  Diagnosis Additional Information: No value filed.    Anesthesia Type:   General     Note:    Oropharynx: oropharynx clear of all foreign objects and spontaneously breathing  Level of Consciousness: drowsy  Oxygen Supplementation: face mask  Level of Supplemental Oxygen (L/min / FiO2): 8  Independent Airway: airway patency satisfactory and stable  Dentition: dentition unchanged  Vital Signs Stable: post-procedure vital signs reviewed and stable  Report to RN Given: handoff report given  Patient transferred to: PACU    Handoff Report: Identifed the Patient, Identified the Reponsible Provider, Reviewed the pertinent medical history, Discussed the surgical course, Reviewed Intra-OP anesthesia mangement and issues during anesthesia, Set expectations for post-procedure period and Allowed opportunity for questions and acknowledgement of understanding  Vitals:  Vitals Value Taken Time   /79 09/19/23 1440   Temp 36.7  C (98  F) 09/19/23 1438   Pulse 96 09/19/23 1445   Resp 18 09/19/23 1445   SpO2 100 % 09/19/23 1445   Vitals shown include unvalidated device data.    Electronically Signed By: GILLIAN Sapp CRNA  September 19, 2023  2:46 PM

## 2023-09-19 NOTE — PHARMACY-ADMISSION MEDICATION HISTORY
Pharmacist Admission Medication History    Admission medication history is complete. The information provided in this note is only as accurate as the sources available at the time of the update.    Medication reconciliation/reorder completed by provider prior to medication history? No    Information Source(s): Patient and CareEverywhere/SureScripts via in-person    Pertinent Information:      Changes made to PTA medication list:  Added: None  Deleted: Eplerenone, Hydroxyzine, Rifaximin, Spironolactone, Tamsulosin, Trazodone  Changed: None    Medication Affordability:  Not including over the counter (OTC) medications, was there a time in the past 3 months when you did not take your medications as prescribed because of cost?: No    Allergies reviewed with patient and updates made in EHR: yes    Medication History Completed By: Fernandez Guy RPH 9/19/2023 10:55 AM    Prior to Admission medications    Medication Sig Last Dose Taking? Auth Provider Long Term End Date   acetaminophen (TYLENOL) 500 MG tablet Take 1,000 mg by mouth every 6 hours as needed for mild pain  Past Week Yes Reported, Patient No    Ascorbic Acid (VITAMIN C PO) Take 500 mg by mouth daily 9/12/2023 Yes Reported, Patient     docusate sodium (COLACE) 100 MG tablet Take 1 tablet (100 mg) by mouth daily 9/18/2023 Yes Dylan Galaviz MD     furosemide (LASIX) 20 MG tablet Take 1 tablet (20 mg) by mouth every evening 9/18/2023 at pm Yes Gonzalo Wahl MD Yes    furosemide (LASIX) 40 MG tablet Take 1 tablet (40 mg) by mouth every morning 9/18/2023 at am Yes Gonzalo Wahl MD Yes    lactulose (CHRONULAC) 10 GM/15ML solution TAKE 30MLS BY MOUTH THREE TIMES DAILY AS NEEDED FOR CONSTIPATION (TAKE AS NEEDED TO MAINTAIN 3 TO 4 BOWEL MOVEMENTS DAILY) prn Yes Celestino Verduzco PA-C     Menaquinone-7 (VITAMIN K2) 100 MCG CAPS Take 200 mcg by mouth daily  9/12/2023 Yes Reported, Patient     MULTIPLE VITAMINS PO Take 1 tablet by mouth daily  9/12/2023 Yes Reported, Patient     potassium chloride ER (KLOR-CON M) 20 MEQ CR tablet Take 2 tablets (40 mEq) by mouth daily 9/18/2023 at am Yes Celestino Verduzco PA-C No    sertraline (ZOLOFT) 25 MG tablet Take 1 tablet (25 mg) by mouth daily 9/18/2023 at am Yes Gonzalo Wahl MD Yes    sildenafil (REVATIO) 20 MG tablet Take 3-5 tabs 1/2-4 hours to relations Past Month Yes Abena Acuña MD Yes    vitamin D3 (CHOLECALCIFEROL) 2000 units (50 mcg) tablet Take 1 tablet by mouth daily 9/12/2023 Yes Unknown, Entered By History

## 2023-09-19 NOTE — TREATMENT PLAN
Orthopedic Surgery Pre-Op Plan: Ivan Bell  pre-op review. This is NOT an H&P   Surgeon: Dr. Landaverde    Hospital: [facility]  Name of Surgery: Left Total Knee Arthroplasty   Date of Surgery: 9/19/23  H&P: Completed on 9/13/23 by ROGER Grier at [facility].   History of ASA, NSAIDS, vitamin and/or herbal supplements, GLP-1 Agonist medication taken within 10 days?: Yes- Multivitamin-instructed patient to hold this for 7 days before surgery.  History of blood thinners?: No    Plan:   1) Discharge Plan: Home POD 1 with assist of Spouse. Please see Discharge Planning section near bottom of this note for further details.     2) Alcoholic Liver Cirrhosis: S/P TIPS placement. Resulting chronically elevated INR and chronic thrombocytopenia: Follows with Dr. Bales at [facility] [place].  INR 1.65 and platelet count 66,000 on 9/13/2023.  On daily vitamin K.  Patient had right unicompartmental knee arthroplasty here 2/2021 and required preop transfusion with platelets and FFP prior to that surgery.  Notified Dr. Landaverde of INR and platelet count above. He is ok proceeding but would like to have platelets near 100,000 and INR < 1.5 before surgery. Discussed with Dr. Landaverde and [facility]. Patient will come in early before surgery. We will perform blood type & screen,  check updated INR and CBC with platelets in preop. We will transfuse with 1 unit of platelets in preop prior to surgery. Depending on updated INR result tomorrow, Dr. Landaverde may also want also transfuse with 1 unit FFP in preop. I have placed orders for 2 units platelets to be ready tomorrow (will transfuse with 1 unit platelets in preop and have extra unit available in case it's needed). I also placed orders for 1 unit FFP to be ready if it is needed.     Reviewed most recent visit note with Dr. Bales from 4/10/23: patient reported doing well. No alcohol use since 2019. No evidence of hepatic  encephalopathy.  Plan is to continue low-sodium diet, continue furosemide at 40 mg daily, continue eplerenone, continue lactulose and rifaximin and follow-up again with Dr. Bales in 6 months (around 10/2023).     3) Hypertension: BP mildly elevated at 148/88 at preop exam.  We will monitor BPs closely during hospital stay.  If BPs consistently elevated, nursing to please notify Hospitalist.  Patient is on multiple diuretic medications due to liver cirrhosis.     4) History of Recurrent Nephrolithiasis: Most recently had  stage stone removal on the left with URS, laser lithotripsy and basket removal of stones on 4/24/2023 and 5/10/2023.  Follows with [facility] nephrology/kidney stone clinic.     5) Chronic Kidney Disease: Stage 2: Stable.  Most recent creatinine 1.24, GFR 67 on 9/13/2023.  Appears baseline creatinine is typically in 1.3-1.4 range. I recommend avoiding nephrotoxins like NSAIDS, promoting good post-op hydration and monitoring post-op kidney function closely.      6) Benign Prostatic Hyperplasia (BPH): with urinary urgency: Follows with Urology at [facility].  On tamsulosin.  At increased risk for postop urinary retention.  Recommend patient remains on tamsulosin perioperatively and has close monitoring for urinary retention with frequent bladder scanning as needed.     7) Depression: On sertraline.    8) Alcohol Use Disorder: in sustained remission: reports no alcohol use since 2019. Follows with [facility] in [place].     Patient appears medically optimized for upcoming surgery. I would recommend Hospitalist Consult to assist with medical management. Please call me below with any questions on this patient.       Review of Systems Notable for: Alcoholic liver cirrhosis-s/p TIPS placement with resulting chronically elevated INR and chronic thrombocytopenia, hypertension, history of recurrent nephrolithiasis, chronic kidney disease-stage  2, benign prostatic hyperplasia, depression, alcohol use disorder-in sustained remission.    Past Medical History:   Past Medical History:   Diagnosis Date    Alcoholic cirrhosis of liver (H)     Alcoholic hepatitis 03/2019    Chronic kidney disease     CRF    Depression     Gout     History of transfusion     Hypertension     Hypertriglyceridemia     Left calcaneus fracture 01/09/2006 January 16, 2006: Fell 10 feet from ladder onto left foot on frozen ground on 1/9/06 at home.  Immediate pain and unable to walk- seen at [place] and diagnosed with calcaneus fracture    Nephrolithiasis     Osteoarthritis     Portal vein thrombosis     left occlusion, partial main    Thrombocytopenia (H)      Past Surgical History:   Procedure Laterality Date    ANKLE SURGERY Left     ANKLE SURGERY      ARTHROSCOPY KNEE      COLONOSCOPY N/A 03/31/2016    Procedure: COLONOSCOPY;  Surgeon: Rhys Uriostegui MD;  Location: [facility]    COMBINED CYSTOSCOPY, RETROGRADES, URETEROSCOPY, LASER HOLMIUM LITHOTRIPSY URETER(S), INSERT STENT Bilateral 7/1/2022    Procedure: CYSTOSCOPY, RIGHT RETROGRADE PYELOGRAM, RIGHT URETEROSCOPY WITH LASER LITHOTRIPSY AND BASKET REMOVAL OF STONE, RIGHT URETERAL STENT PLACEMENT, LEFT RETROGRADE PYELOGRAM, LEFT URETEROSCOPY WITH LASER LITHOTRIPSY AND BASKET REMOVAL OF STONE, LEFT URETERAL STENT PLACEMENT;  Surgeon: Dylan Galaviz MD;  Location: [facility]    COMBINED CYSTOSCOPY, RETROGRADES, URETEROSCOPY, LASER HOLMIUM LITHOTRIPSY URETER(S), INSERT STENT Bilateral 7/27/2022    Procedure: CYSTOSCOPY, BILATERAL URETERAL STENT REMOVAL, BILATERAL  RETROGRADE PYELOGRAM, BILATERAL URETEROSCOPY. HOLMIUM LASER LITHOTRIPSY LEFT SIDE.  AND BASKET REMOVAL OF STONES BILATERAL, LEFT  URETERAL STENT PLACEMENT;  Surgeon: Dylan Galaviz MD;  Location: [facility]    COMBINED CYSTOSCOPY, RETROGRADES, URETEROSCOPY, LASER HOLMIUM LITHOTRIPSY URETER(S), INSERT STENT Left 4/24/2023    Procedure: CYSTOSCOPY, LEFT RETROGRADE  PYELOGRAM, LEFT URETEROSCOPY WITH LASER LITHOTRIOPSY AND BASKET REMOVAL OF STONES, LEFT URETERAL STENT PLACEMENT;  Surgeon: Dylan Galaviz MD;  Location:  OR    COMBINED CYSTOSCOPY, RETROGRADES, URETEROSCOPY, LASER HOLMIUM LITHOTRIPSY URETER(S), INSERT STENT Left 5/10/2023    Procedure: CYSTOSCOPY, LEFT URETERAL STENT REMOVAL, LEFT RETROGRADE PYELOGRAM, LEFT URETEROSCOPY WITH LASER LITHOTRIOPSY AND BASKET REMOVAL OF STONES, LEFT URETERAL STENT PLACEMENT;  Surgeon: Dylan Galaviz MD;  Location:  OR    ESOPHAGOSCOPY, GASTROSCOPY, DUODENOSCOPY (EGD), COMBINED N/A 03/31/2016    Procedure: COMBINED ESOPHAGOSCOPY, GASTROSCOPY, DUODENOSCOPY (EGD);  Surgeon: Rhys Uriostegui MD;  Location:  GI    ESOPHAGOSCOPY, GASTROSCOPY, DUODENOSCOPY (EGD), COMBINED N/A 03/09/2018    Procedure: COMBINED ESOPHAGOSCOPY, GASTROSCOPY, DUODENOSCOPY (EGD), BIOPSY SINGLE OR MULTIPLE;  EGD;  Surgeon: Gonzalo Wahl MD;  Location:  GI    ESOPHAGOSCOPY, GASTROSCOPY, DUODENOSCOPY (EGD), COMBINED N/A 06/07/2019    Procedure: ESOPHAGOGASTRODUODENOSCOPY (EGD);  Surgeon: Gonzalo Wahl MD;  Location:  GI    FOOT SURGERY      IR PARACENTESIS  10/29/2019    IR PARACENTESIS  11/25/2020    IR PARACENTESIS  08/11/2021    IR PARACENTESIS  01/28/2022    IR PARACENTESIS  03/30/2022    IR TRANSVEN INTRAHEPATIC PORTOSYST REV  10/29/2019    IR TRANSVEN INTRAHEPATIC PORTOSYST REV  11/25/2020    IR TRANSVEN INTRAHEPATIC PORTOSYST REV  08/11/2021    IR TRANSVEN INTRAHEPATIC PORTOSYST REV  01/28/2022    IR TRANSVEN INTRAHEPATIC PORTOSYST REV  03/30/2022    KNEE SURGERY Left     KNEE SURGERY Right     RELEASE CARPAL TUNNEL      RELEASE TRIGGER FINGER Right 06/11/2020    Procedure: RELEASE, TRIGGER FINGER, right ring and long finger;  Surgeon: Pascual Valencia MD;  Location:  OR    SIGMOIDOSCOPY FLEXIBLE N/A 10/31/2017    Procedure: SIGMOIDOSCOPY FLEXIBLE;;  Surgeon: Armaan Adams MD;  Location: [facility]    TIPS Procedure   06/06/2018    TIPS PROCEDURE  11/01/2020    ZZC PLASTY KNEE,MED OR LAT COMPARTMT Right 02/19/2021    Procedure: RIGHT UNICOMPARTMENTAL ARTHROPLASTY KNEE MEDIAL;  Surgeon: José Miguel Landaverde MD;  Location: [facility];  Service: Orthopedics       Current Medications:  Patient's Medications   New Prescriptions    No medications on file   Previous Medications    ACETAMINOPHEN (TYLENOL) 500 MG TABLET    Take 1,000 mg by mouth every 6 hours as needed for mild pain     ASCORBIC ACID (VITAMIN C PO)    Take by mouth daily    BISACODYL (DULCOLAX) 5 MG EC TABLET    Take 2 tablets at 3 pm the day before your procedure. If your procedure is before 11 am, take 2 additional tablets at 11 pm. If your procedure is after 11 am, take 2 additional tablets at 6 am. For additional instructions refer to your colonoscopy prep instructions.    DOCUSATE SODIUM (COLACE) 100 MG TABLET    Take 1 tablet (100 mg) by mouth daily    DOCUSATE SODIUM (COLACE) 100 MG TABLET    Take 1 tablet (100 mg) by mouth daily    EPLERENONE (INSPRA) 50 MG TABLET    Take 2 tablets (100 mg) by mouth 2 times daily Patient taking as ordered as of 8/16/22.    FEXOFENADINE (ALLEGRA) 60 MG TABLET    Take 1 tablet (60 mg) by mouth daily    FUROSEMIDE (LASIX) 20 MG TABLET    Take 1 tablet (20 mg) by mouth every evening    FUROSEMIDE (LASIX) 40 MG TABLET    Take 1 tablet (40 mg) by mouth every morning    HYDROXYZINE (VISTARIL) 50 MG CAPSULE    Take 1 capsule (50 mg) by mouth at bedtime as needed, may repeat once (For anxiety and difficulty sleeping)    LACTULOSE (CHRONULAC) 10 GM/15ML SOLUTION    TAKE 30MLS BY MOUTH THREE TIMES DAILY AS NEEDED FOR CONSTIPATION (TAKE AS NEEDED TO MAINTAIN 3 TO 4 BOWEL MOVEMENTS DAILY)    MENAQUINONE-7 (VITAMIN K2) 100 MCG CAPS    Take 200 mcg by mouth daily     MULTIPLE VITAMINS PO    Take 1 tablet by mouth daily    POLYETHYLENE GLYCOL (GOLYTELY) 236 G SUSPENSION    The night before the exam at 6 pm drink an 8-ounce glass every 15  minutes until the jug is half empty. If you arrive before 11 AM: Drink the other half of the Awesome[employer]mely jug at 11 PM night before procedure. If you arrive after 11 AM: Drink the other half of the [employer] jug at 6 AM day of procedure. For additional instructions refer to your colonoscopy prep instructions.    POTASSIUM CHLORIDE ER (KLOR-CON M) 20 MEQ CR TABLET    Take 2 tablets (40 mEq) by mouth daily    RIFAXIMIN (XIFAXAN) 550 MG TABS TABLET    Take 1 tablet (550 mg) by mouth 2 times daily    SERTRALINE (ZOLOFT) 25 MG TABLET    Take 1 tablet (25 mg) by mouth daily    SILDENAFIL (REVATIO) 20 MG TABLET    Take 3-5 tabs 1/2-4 hours to relations    SODIUM BICARBONATE 650 MG TABLET    Take 1 tablet (650 mg) by mouth 2 times daily    SPIRONOLACTONE (ALDACTONE) 50 MG TABLET    Take 50 mg by mouth daily    TAMSULOSIN (FLOMAX) 0.4 MG CAPSULE    Take 1 capsule (0.4 mg) by mouth daily    TRAZODONE (DESYREL) 50 MG TABLET    Take 1-2 tablets ( mg) by mouth nightly as needed for sleep    VITAMIN D3 (CHOLECALCIFEROL) 2000 UNITS (50 MCG) TABLET    Take 1 tablet by mouth daily   Modified Medications    No medications on file   Discontinued Medications    No medications on file       ALLERGIES:  Allergies   Allergen Reactions    Trazodone Visual Disturbance    Oxycodone Other (See Comments)     Delirium and constipation  Delirium and constipation       Social History  Social History     Tobacco Use    Smoking status: Former     Packs/day: 0.00     Types: Dip, chew, snus or snuff, Cigarettes     Quit date: 1998     Years since quittin.0     Passive exposure: Never    Smokeless tobacco: Former     Types: Chew    Tobacco comments:     1 tin per 10 days.   Vaping Use    Vaping Use: Never used   Substance Use Topics    Alcohol use: No     Alcohol/week: 17.5 standard drinks of alcohol     Types: 21 Cans of beer per week    Drug use: No       Any Abnormal Recent Diagnostics? Yes  INR 1.65 on 2023: Patient has chronically  elevated INR due to liver cirrhosis.  Dr. Landaverde notified and is okay proceeding but would like INR to be <1.5 prior to surgery.  We will check updated INR on day of surgery. If INR > 1.5  Dr. Landaverde may choose to transfuse with 1 unit FFP in preop.  Platelet count 66,000 on 9/13/2023: Dr. Landaverde notified and would like platelet count to be around 100,000 prior to surgery.  We will transfuse with 1 unit platelets in preop and have another unit of platelets available on day of surgery in case it is needed.  Protein total 5.4, albumin 2.9, bilirubin 2.3 on 9/13/2023: Due to liver cirrhosis.  Creatinine 1.24, GFR 67 on 9/13/2023: Shows stable chronic kidney disease-stage 2.     Discharge Planning:   Discharge plan according to [facility]:     Home POD 1 with assist of Spouse     08/30/23 1330   Discharge Planning   Patient/Family Anticipates Transition to home with family   Living Arrangements   People in Home spouse   Type of Residence Private Residence   Is your private residence a single family home or apartment? Single family home   Number of Stairs, Within Home, Primary none   Once home, are you able to live on one level? Yes   Which level? Main Level   Bathroom Shower/Tub Walk-in shower   Equipment Currently Used at Home raised toilet seat   Support System   Support Systems Spouse/Significant Other   Do you have someone available to stay with you one or two nights once you are home? Yes       GILLIAN Correa, CNP   Advanced Practice Nurse Navigator- Orthopedics  [facility]   Phone: [phone]

## 2023-09-19 NOTE — ANESTHESIA PREPROCEDURE EVALUATION
Anesthesia Pre-Procedure Evaluation    Patient: Ivan Bell   MRN: [id number] : 1965        Procedure : Procedure(s):  LEFT TOTAL KNEE ARTHROPLASTY          Past Medical History:   Diagnosis Date    Alcoholic cirrhosis of liver (H)     Alcoholic hepatitis 2019    Chronic kidney disease     CRF    Depression     Gout     History of transfusion     Hypertension     Hypertriglyceridemia     Left calcaneus fracture 2006: Fell 10 feet from ladder onto left foot on frozen ground on 06 at home.  Immediate pain and unable to walk- seen at [place] and diagnosed with calcaneus fracture    Nephrolithiasis     Osteoarthritis     Portal vein thrombosis     left occlusion, partial main    Thrombocytopenia (H)       Past Surgical History:   Procedure Laterality Date    ANKLE SURGERY Left     ANKLE SURGERY      ARTHROSCOPY KNEE      COLONOSCOPY N/A 2016    Procedure: COLONOSCOPY;  Surgeon: Rhys Uriostegui MD;  Location: [facility]    COMBINED CYSTOSCOPY, RETROGRADES, URETEROSCOPY, LASER HOLMIUM LITHOTRIPSY URETER(S), INSERT STENT Bilateral 2022    Procedure: CYSTOSCOPY, RIGHT RETROGRADE PYELOGRAM, RIGHT URETEROSCOPY WITH LASER LITHOTRIPSY AND BASKET REMOVAL OF STONE, RIGHT URETERAL STENT PLACEMENT, LEFT RETROGRADE PYELOGRAM, LEFT URETEROSCOPY WITH LASER LITHOTRIPSY AND BASKET REMOVAL OF STONE, LEFT URETERAL STENT PLACEMENT;  Surgeon: Dylan Galaviz MD;  Location:  OR    COMBINED CYSTOSCOPY, RETROGRADES, URETEROSCOPY, LASER HOLMIUM LITHOTRIPSY URETER(S), INSERT STENT Bilateral 2022    Procedure: CYSTOSCOPY, BILATERAL URETERAL STENT REMOVAL, BILATERAL  RETROGRADE PYELOGRAM, BILATERAL URETEROSCOPY. HOLMIUM LASER LITHOTRIPSY LEFT SIDE.  AND BASKET REMOVAL OF STONES BILATERAL, LEFT  URETERAL STENT PLACEMENT;  Surgeon: Dylan Galaviz MD;  Location:  OR    COMBINED CYSTOSCOPY, RETROGRADES, URETEROSCOPY, LASER HOLMIUM LITHOTRIPSY URETER(S), INSERT STENT Left  4/24/2023    Procedure: CYSTOSCOPY, LEFT RETROGRADE PYELOGRAM, LEFT URETEROSCOPY WITH LASER LITHOTRIOPSY AND BASKET REMOVAL OF STONES, LEFT URETERAL STENT PLACEMENT;  Surgeon: Dylan Galaviz MD;  Location:  OR    COMBINED CYSTOSCOPY, RETROGRADES, URETEROSCOPY, LASER HOLMIUM LITHOTRIPSY URETER(S), INSERT STENT Left 5/10/2023    Procedure: CYSTOSCOPY, LEFT URETERAL STENT REMOVAL, LEFT RETROGRADE PYELOGRAM, LEFT URETEROSCOPY WITH LASER LITHOTRIOPSY AND BASKET REMOVAL OF STONES, LEFT URETERAL STENT PLACEMENT;  Surgeon: Dylan Galaviz MD;  Location:  OR    ESOPHAGOSCOPY, GASTROSCOPY, DUODENOSCOPY (EGD), COMBINED N/A 03/31/2016    Procedure: COMBINED ESOPHAGOSCOPY, GASTROSCOPY, DUODENOSCOPY (EGD);  Surgeon: Rhys Uriostegui MD;  Location:  GI    ESOPHAGOSCOPY, GASTROSCOPY, DUODENOSCOPY (EGD), COMBINED N/A 03/09/2018    Procedure: COMBINED ESOPHAGOSCOPY, GASTROSCOPY, DUODENOSCOPY (EGD), BIOPSY SINGLE OR MULTIPLE;  EGD;  Surgeon: Gonzalo Wahl MD;  Location:  GI    ESOPHAGOSCOPY, GASTROSCOPY, DUODENOSCOPY (EGD), COMBINED N/A 06/07/2019    Procedure: ESOPHAGOGASTRODUODENOSCOPY (EGD);  Surgeon: Gonzalo Wahl MD;  Location:  GI    FOOT SURGERY      IR PARACENTESIS  10/29/2019    IR PARACENTESIS  11/25/2020    IR PARACENTESIS  08/11/2021    IR PARACENTESIS  01/28/2022    IR PARACENTESIS  03/30/2022    IR TRANSVEN INTRAHEPATIC PORTOSYST REV  10/29/2019    IR TRANSVEN INTRAHEPATIC PORTOSYST REV  11/25/2020    IR TRANSVEN INTRAHEPATIC PORTOSYST REV  08/11/2021    IR TRANSVEN INTRAHEPATIC PORTOSYST REV  01/28/2022    IR TRANSVEN INTRAHEPATIC PORTOSYST REV  03/30/2022    KNEE SURGERY Left     KNEE SURGERY Right     RELEASE CARPAL TUNNEL      RELEASE TRIGGER FINGER Right 06/11/2020    Procedure: RELEASE, TRIGGER FINGER, right ring and long finger;  Surgeon: Pascual Valencia MD;  Location:  OR    SIGMOIDOSCOPY FLEXIBLE N/A 10/31/2017    Procedure: SIGMOIDOSCOPY FLEXIBLE;;  Surgeon:  Armaan Adams MD;  [facility]    TIPS Procedure  2018    TIPS PROCEDURE  2020    ZZC PLASTY KNEE,MED OR LAT COMPARTMT Right 2021    Procedure: RIGHT UNICOMPARTMENTAL ARTHROPLASTY KNEE MEDIAL;  Surgeon: José Miguel Landaverde MD;  Location: [facility];  Service: Orthopedics      Allergies   Allergen Reactions    Trazodone Visual Disturbance    Oxycodone Other (See Comments)     Delirium and constipation  Delirium and constipation      Social History     Tobacco Use    Smoking status: Former     Packs/day: 0.00     Types: Dip, chew, snus or snuff, Cigarettes     Quit date: 1998     Years since quittin.0     Passive exposure: Never    Smokeless tobacco: Former     Types: Chew    Tobacco comments:     1 tin per 10 days.   Substance Use Topics    Alcohol use: No     Alcohol/week: 17.5 standard drinks of alcohol     Types: 21 Cans of beer per week      Wt Readings from Last 1 Encounters:   23 81.6 kg (180 lb)        Anesthesia Evaluation            ROS/MED HX  ENT/Pulmonary:  - neg pulmonary ROS     Neurologic:  - neg neurologic ROS     Cardiovascular:     (+)  - -   -  - -   Taking blood thinners                                   METS/Exercise Tolerance: >4 METS    Hematologic:  - neg hematologic  ROS     Musculoskeletal:  - neg musculoskeletal ROS     GI/Hepatic:       Renal/Genitourinary:       Endo:  - neg endo ROS     Psychiatric/Substance Use:  - neg psychiatric ROS     Infectious Disease:  - neg infectious disease ROS     Malignancy:  - neg malignancy ROS     Other:  - neg other ROS          Physical Exam    Airway  airway exam normal      Mallampati: II       Respiratory Devices and Support         Dental  no notable dental history         Cardiovascular   cardiovascular exam normal          Pulmonary   pulmonary exam normal                OUTSIDE LABS:  CBC:   Lab Results   Component Value Date    WBC 3.6 (L) 2023    WBC 4.4 04/10/2023    HGB 12.2 (L) 2023     HGB 11.0 (L) 04/10/2023    HCT 35.2 (L) 09/13/2023    HCT 32.7 (L) 04/10/2023    PLT 66 (L) 09/13/2023    PLT 74 (L) 04/10/2023     BMP:   Lab Results   Component Value Date     09/13/2023     07/06/2023    POTASSIUM 4.0 09/13/2023    POTASSIUM 3.6 07/06/2023    CHLORIDE 115 (H) 09/13/2023    CHLORIDE 112 (H) 07/06/2023    CO2 21 (L) 09/13/2023    CO2 24 07/06/2023    BUN 10.1 09/13/2023    BUN 12.2 07/06/2023    CR 1.24 (H) 09/13/2023    CR 1.32 (H) 07/06/2023    GLC 87 09/13/2023     (H) 07/06/2023     COAGS:   Lab Results   Component Value Date    PTT 41 (H) 02/02/2022    INR 1.65 (H) 09/13/2023    FIBR 181 (L) 03/10/2016     POC:   Lab Results   Component Value Date    BGM 77 06/06/2018     HEPATIC:   Lab Results   Component Value Date    ALBUMIN 2.9 (L) 09/13/2023    PROTTOTAL 5.4 (L) 09/13/2023    ALT 5 09/13/2023    AST 35 09/13/2023    GGT 3411 (H) 01/26/2004    ALKPHOS 125 09/13/2023    BILITOTAL 2.3 (H) 09/13/2023    LUISITO 62 (H) 06/04/2019     OTHER:   Lab Results   Component Value Date    LACT 1.4 11/01/2019    A1C 5.3 01/13/2008    JULISSA 9.3 09/13/2023    PHOS 2.4 (L) 07/06/2023    MAG 1.8 12/31/2019    LIPASE 327 02/02/2022    AMYLASE 66 01/26/2004    TSH 1.22 06/09/2018    CRP <2.9 05/27/2020    SED 9 05/27/2020       Anesthesia Plan    ASA Status:  4       Anesthesia Type: General.     - Airway: LMA              Consents    Anesthesia Plan(s) and associated risks, benefits, and realistic alternatives discussed. Questions answered and patient/representative(s) expressed understanding.     - Discussed:     - Discussed with:  Patient            Postoperative Care    Pain management: Peripheral nerve block (Single Shot).        Comments:                Narinder Taylor MD

## 2023-09-19 NOTE — INTERVAL H&P NOTE
"I have reviewed the surgical (or preoperative) H&P that is linked to this encounter, and examined the patient. There are no significant changes    Clinical Conditions Present on Arrival:  Clinically Significant Risk Factors Present on Admission              # Hypoalbuminemia: Lowest albumin = 2.9 g/dL in the past 30 days , will monitor as appropriate   # Coagulation Defect: INR = 1.70 (Ref range: 0.85 - 1.15) and/or PTT = N/A, will monitor for bleeding  # Thrombocytopenia: Lowest platelets = 55 in last 2 days, will monitor for bleeding  # Overweight: Estimated body mass index is 26.08 kg/m  as calculated from the following:    Height as of this encounter: 1.803 m (5' 11\").    Weight as of this encounter: 84.8 kg (187 lb).       "

## 2023-09-19 NOTE — OP NOTE
Operative Report    PATIENT:  Ivan Bell    DATE OF SURGERY:  9/19/2023    SURGEON  José Miguel Landaverde MD.      FIRST ASSISTANT  Jenifer Haji PA-C  (Expert TU assist was required throughout for patient positioning, soft tissue retraction, appropriate use of knee instrumentation, and patient safety)     PREOPERATIVE DIAGNOSIS  left knee osteoarthritis     POSTOPERATIVE DIAGNOSIS  left knee osteoarthritis.         PROCEDURE  left Total Knee Arthroplasty.         ANESTHESIA  Spinal    SPECIMENS: none     ESTIMATED BLOOD LOSS  50cc     INDICATIONS  Mr. Ivan Bell is a pleasant 58 year old-year-old male with an ongoing history of increasing and progressive pain in the left knee with severe disability. Pain and disability due to knee arthritis are severely affecting quality of life and ability to perform even simple activities of daily living.  X-rays have shown bone-on-bone degenerative change. Consequently after trying and failing all conservative management of knee arthritis, discussion regarding the risk and benefits of knee replacement was undertaken and the patient elected to proceed.     FINDINGS:  The operative knee showed a severe full-thickness cartilage loss on the femur and tibia in the medial compartment of the knee.  The patellofemoral joint also showed advanced arthritic changes.      IMPLANTS  1. Connor, Persona, CR femoral component, size 9 .  2. Connor, Persona, tibial component, size F.  3. Connor, Persona,  all polyethylene articular surface 11 mm thickness.  MC  4. Connor, Persona, all polyethylene patellar button, 38mm diameter      PROCEDURE  Once consent was obtained and the operative site marked in the preop holding area, the patient was brought to the operating room.  Anesthesia was established without difficulty. All bony prominences and the non-operative leg were padded appropriately. The left leg was sterilely prepped and draped in the usual fashion after placement of a  proximal tourniquet.  Tourniquet was  inflated.     A longitudinal incision made over the knee.  Dissection was carried down through the extensor mechanism.  A medial parapatellar arthrotomy  was performed.  The patella was luxed laterally and protected. Standard medial release performed.    An intramedullary guide was used in the femur.  The distal femoral was made at 4 degrees of valgus.  The femoral cutting block  was applied and the rest of the femoral cuts completed. ACL was removed and the PCL was recessed.    Attention was then turned to the tibia.  This was cut using an extramedullary tibial cutting guide perpendicular to its mechanical axis.  The trial tibial and femoral components were then placed and the knee reduced. The patella was resurfaced and found to track well. Flexion and extension gaps were appropriate and varus valgus stability was good.     The knee was copiously irrigated and all bony surfaces dried.  Cement was mixed and all components were cemented and held until firm.  The knee was again trialed.  The  Polyethylene was opened and snapped into place, the locking mechanism was ensured.  The knee was copiously irrigated. The extensor mechanism was closed with # 1 interrupted Vicryl suture. Deep dermis was closed layer-wise of # 2-0 interrupted inverted Vicryl sutures followed by running # 3-0 Monocryl in the skin.  Dressings were applied. The patient tolerated the procedure well and was returned to the postop recovery area in stable condition.          JOSÉ MIGUEL MELGAR MD    @C(1)@  José Miguel Melgar MD    @C(2)@  [place]

## 2023-09-20 ENCOUNTER — APPOINTMENT (OUTPATIENT)
Dept: OCCUPATIONAL THERAPY | Facility: CLINIC | Age: 58
End: 2023-09-20
Attending: ORTHOPAEDIC SURGERY
Payer: COMMERCIAL

## 2023-09-20 ENCOUNTER — APPOINTMENT (OUTPATIENT)
Dept: PHYSICAL THERAPY | Facility: CLINIC | Age: 58
End: 2023-09-20
Attending: ORTHOPAEDIC SURGERY
Payer: COMMERCIAL

## 2023-09-20 PROBLEM — G47.10 HYPERSOMNOLENCE: Status: ACTIVE | Noted: 2023-09-20

## 2023-09-20 LAB
ANION GAP SERPL CALCULATED.3IONS-SCNC: 5 MMOL/L (ref 7–15)
BUN SERPL-MCNC: 15.1 MG/DL (ref 8–23)
CALCIUM SERPL-MCNC: 8.7 MG/DL (ref 8.8–10.2)
CHLORIDE SERPL-SCNC: 110 MMOL/L (ref 98–107)
CREAT SERPL-MCNC: 1.34 MG/DL (ref 0.67–1.17)
DEPRECATED HCO3 PLAS-SCNC: 24 MMOL/L (ref 22–29)
EGFRCR SERPLBLD CKD-EPI 2021: 61 ML/MIN/1.73M2
ERYTHROCYTE [DISTWIDTH] IN BLOOD BY AUTOMATED COUNT: 15.7 % (ref 10–15)
GLUCOSE BLDC GLUCOMTR-MCNC: 157 MG/DL (ref 70–99)
GLUCOSE SERPL-MCNC: 174 MG/DL (ref 70–99)
HBA1C MFR BLD: 4.8 %
HCT VFR BLD AUTO: 30.2 % (ref 40–53)
HGB BLD-MCNC: 10 G/DL (ref 13.3–17.7)
INR PPP: 1.67 (ref 0.85–1.15)
MCH RBC QN AUTO: 31.5 PG (ref 26.5–33)
MCHC RBC AUTO-ENTMCNC: 33.1 G/DL (ref 31.5–36.5)
MCV RBC AUTO: 95 FL (ref 78–100)
PLATELET # BLD AUTO: 52 10E3/UL (ref 150–450)
POTASSIUM SERPL-SCNC: 3.8 MMOL/L (ref 3.4–5.3)
RBC # BLD AUTO: 3.17 10E6/UL (ref 4.4–5.9)
SODIUM SERPL-SCNC: 139 MMOL/L (ref 136–145)
WBC # BLD AUTO: 5.3 10E3/UL (ref 4–11)

## 2023-09-20 PROCEDURE — 83036 HEMOGLOBIN GLYCOSYLATED A1C: CPT | Performed by: FAMILY MEDICINE

## 2023-09-20 PROCEDURE — 99214 OFFICE O/P EST MOD 30 MIN: CPT | Performed by: FAMILY MEDICINE

## 2023-09-20 PROCEDURE — 85610 PROTHROMBIN TIME: CPT | Performed by: ORTHOPAEDIC SURGERY

## 2023-09-20 PROCEDURE — 80048 BASIC METABOLIC PNL TOTAL CA: CPT | Performed by: FAMILY MEDICINE

## 2023-09-20 PROCEDURE — 36415 COLL VENOUS BLD VENIPUNCTURE: CPT | Performed by: FAMILY MEDICINE

## 2023-09-20 PROCEDURE — 97162 PT EVAL MOD COMPLEX 30 MIN: CPT | Mod: GP

## 2023-09-20 PROCEDURE — 82962 GLUCOSE BLOOD TEST: CPT

## 2023-09-20 PROCEDURE — 97535 SELF CARE MNGMENT TRAINING: CPT | Mod: GO

## 2023-09-20 PROCEDURE — 250N000013 HC RX MED GY IP 250 OP 250 PS 637: Performed by: FAMILY MEDICINE

## 2023-09-20 PROCEDURE — 97530 THERAPEUTIC ACTIVITIES: CPT | Mod: GP

## 2023-09-20 PROCEDURE — 250N000013 HC RX MED GY IP 250 OP 250 PS 637: Performed by: ORTHOPAEDIC SURGERY

## 2023-09-20 PROCEDURE — 97166 OT EVAL MOD COMPLEX 45 MIN: CPT | Mod: GO

## 2023-09-20 PROCEDURE — 250N000011 HC RX IP 250 OP 636: Mod: JZ | Performed by: HOSPITALIST

## 2023-09-20 PROCEDURE — 250N000011 HC RX IP 250 OP 636: Mod: JZ | Performed by: ORTHOPAEDIC SURGERY

## 2023-09-20 PROCEDURE — 85027 COMPLETE CBC AUTOMATED: CPT | Performed by: ORTHOPAEDIC SURGERY

## 2023-09-20 RX ORDER — HYDROMORPHONE HYDROCHLORIDE 2 MG/1
2 TABLET ORAL EVERY 4 HOURS PRN
Status: DISCONTINUED | OUTPATIENT
Start: 2023-09-20 | End: 2023-09-22 | Stop reason: HOSPADM

## 2023-09-20 RX ORDER — HYDROXYZINE HYDROCHLORIDE 25 MG/1
25 TABLET, FILM COATED ORAL EVERY 6 HOURS PRN
Qty: 30 TABLET | Refills: 0 | Status: SHIPPED | OUTPATIENT
Start: 2023-09-20 | End: 2023-09-22

## 2023-09-20 RX ORDER — LACTULOSE 10 G/15ML
10 SOLUTION ORAL 3 TIMES DAILY
Status: DISCONTINUED | OUTPATIENT
Start: 2023-09-20 | End: 2023-09-20

## 2023-09-20 RX ORDER — ACETAMINOPHEN 325 MG/1
650 TABLET ORAL 3 TIMES DAILY
Status: DISCONTINUED | OUTPATIENT
Start: 2023-09-20 | End: 2023-09-22 | Stop reason: HOSPADM

## 2023-09-20 RX ORDER — LACTULOSE 10 G/15ML
20 SOLUTION ORAL 3 TIMES DAILY
Status: DISCONTINUED | OUTPATIENT
Start: 2023-09-20 | End: 2023-09-22 | Stop reason: HOSPADM

## 2023-09-20 RX ADMIN — HYDROMORPHONE HYDROCHLORIDE 2 MG: 2 TABLET ORAL at 05:18

## 2023-09-20 RX ADMIN — LACTULOSE 10 G: 10 SOLUTION ORAL at 14:54

## 2023-09-20 RX ADMIN — LACTULOSE 10 G: 10 SOLUTION ORAL at 16:42

## 2023-09-20 RX ADMIN — HYDRALAZINE HYDROCHLORIDE 10 MG: 20 INJECTION, SOLUTION INTRAMUSCULAR; INTRAVENOUS at 22:15

## 2023-09-20 RX ADMIN — SENNOSIDES AND DOCUSATE SODIUM 1 TABLET: 50; 8.6 TABLET ORAL at 08:48

## 2023-09-20 RX ADMIN — HYDROMORPHONE HYDROCHLORIDE 4 MG: 2 TABLET ORAL at 01:08

## 2023-09-20 RX ADMIN — LACTULOSE 20 G: 10 SOLUTION ORAL at 21:14

## 2023-09-20 RX ADMIN — FUROSEMIDE 40 MG: 40 TABLET ORAL at 08:48

## 2023-09-20 RX ADMIN — Medication 50 MCG: at 08:48

## 2023-09-20 RX ADMIN — POLYETHYLENE GLYCOL 3350 17 G: 17 POWDER, FOR SOLUTION ORAL at 08:48

## 2023-09-20 RX ADMIN — THERA TABS 1 TABLET: TAB at 08:47

## 2023-09-20 RX ADMIN — POTASSIUM CHLORIDE 40 MEQ: 1500 TABLET, EXTENDED RELEASE ORAL at 08:47

## 2023-09-20 RX ADMIN — ACETAMINOPHEN 650 MG: 325 TABLET ORAL at 21:15

## 2023-09-20 RX ADMIN — ACETAMINOPHEN 650 MG: 325 TABLET ORAL at 14:55

## 2023-09-20 RX ADMIN — SERTRALINE HYDROCHLORIDE 25 MG: 25 TABLET, FILM COATED ORAL at 08:48

## 2023-09-20 RX ADMIN — FUROSEMIDE 20 MG: 20 TABLET ORAL at 19:21

## 2023-09-20 RX ADMIN — SENNOSIDES AND DOCUSATE SODIUM 1 TABLET: 50; 8.6 TABLET ORAL at 19:21

## 2023-09-20 RX ADMIN — LACTULOSE 10 G: 10 SOLUTION ORAL at 11:16

## 2023-09-20 RX ADMIN — CEFAZOLIN SODIUM 2 G: 2 INJECTION, SOLUTION INTRAVENOUS at 05:10

## 2023-09-20 ASSESSMENT — ACTIVITIES OF DAILY LIVING (ADL)
ADLS_ACUITY_SCORE: 22
ADLS_ACUITY_SCORE: 28

## 2023-09-20 NOTE — PLAN OF CARE
Patient alert and oriented. Vitally stable, although Bps were high, PRN hydralazine given. RA.  LR infusing at 75ml/hr. Pleasant and cooperative with cares. Tolerating Regular diet. Voiding spontaneously. Has not been up walking. PRN Dilaudid given for pain. Hemovac output of 30ml. Resting in bed. Call light within reach. Plan of care ongoing.

## 2023-09-20 NOTE — PROGRESS NOTES
Patient lethargic & difficult to arouse, falls asleep quickly after being woken. VSS on 2L O2. Pain is managed with PO tylenol. PRN lactulose given for drowsiness related to pain medication, was not yet effective. Hemovac removed and dressing changed today, dime sized drainage on new dressing. Patient ambulates Ax1-2 with walker due to drowsiness, SBA when alert. PIV is patent and infusing LR at 75/hr. Tolerating oral intake and voiding appropriately. Patient up in chair. Patient is safe, will continue to monitor.    Halle Valdez RN

## 2023-09-20 NOTE — PROGRESS NOTES
"Orthopedic Progress Note      Assessment: 1 Day Post-Op  S/P Procedure(s):  LEFT TOTAL KNEE ARTHROPLASTY @    Plan:   - Continue PT/OT.   - Weightbearing status: WBAT.  - Anticoagulation: none in addition to SCDs, denilson stockings and early ambulation.  - Discharge planning: Discharge to home today.     Subjective:  Pain: Well controlled on Tylenol & oxycodone.  Nausea, Vomiting:  No  Chest pain: No  Lightheadedness, Dizziness:  No  Neuro:  Patient denies new onset numbness or paresthesias in left lower extremity     Patient is doing well on POD #1. Ambulating, tolerating oral intake, voiding & pain is controlled with oral medication. Ready for discharge. Hgb 10 (pre-op 10.5).     Objective:  BP (!) 166/73 (BP Location: Right arm, Patient Position: Semi-Lozada's, Cuff Size: Adult Regular)   Pulse 79   Temp 97.6  F (36.4  C) (Oral)   Resp 18   Ht 1.803 m (5' 11\")   Wt 84.8 kg (187 lb)   SpO2 90%   BMI 26.08 kg/m    The patient is A&Ox3. Appears comfortable, sitting up at bedside.  Sensation is intact to light touch & equal bilaterally in the L2 through S1 dermatomes.  Calves are soft and non-tender.  Dorsiflexion and plantar flexion is intact bilaterally.  Appropriate flexion and extension of the toes bilaterally.   Brisk capillary refill in the toes bilaterally.   Palpable left dorsalis pedis pulse.  left knee dressing C/D/I.  Changed this morning and drain pulled    Pertinent Labs   Lab Results: personally reviewed.   Lab Results   Component Value Date    INR 1.67 (H) 09/20/2023    INR 1.70 (H) 09/19/2023    INR 1.65 (H) 09/13/2023     Lab Results   Component Value Date    WBC 5.3 09/20/2023    HGB 10.0 (L) 09/20/2023    HCT 30.2 (L) 09/20/2023    MCV 95 09/20/2023    PLT 52 (L) 09/20/2023     Lab Results   Component Value Date     09/20/2023    CO2 24 09/20/2023         Report completed by:  Josefina Jacob PA-C/Dr. [facility]    Date: 9/20/2023  Time: 9:04 AM    "

## 2023-09-20 NOTE — PROGRESS NOTES
09/20/23 0800   Appointment Info   Signing Clinician's Name / Credentials (PT) Mis Cabrera, PT, DPT   Quick Adds   Quick Adds Certification   Living Environment   People in Home spouse   Current Living Arrangements house   Home Accessibility other (see comments)  (pt reported being unsure if there were stairs in house or not. Pt poor historian d/t current confusion and fatigue)   General Information   Onset of Illness/Injury or Date of Surgery 09/19/23   Referring Physician Sparkle Valdez MD   Patient/Family Therapy Goals Statement (PT) Return home   Pertinent History of Current Problem (include personal factors and/or comorbidities that impact the POC) s/p L TKA   Existing Precautions/Restrictions other (see comments)  (0-75 degrees ROM for first 3 days post op)   Weight-Bearing Status - LLE weight-bearing as tolerated   Cognition   Affect/Mental Status (Cognition) confused   Follows Commands (Cognition) does not follow one-step commands;delayed response/completion   Pain Assessment   Patient Currently in Pain No   Range of Motion (ROM)   ROM Comment Limited d/t TKA   Strength (Manual Muscle Testing)   Strength Comments Limited d/t TKA   Bed Mobility   Bed Mobility rolling left;rolling right   Rolling Left Dahlgren (Bed Mobility) verbal cues;contact guard;other (see comments)   Rolling Right [place] (Bed Mobility) verbal cues;contact guard   Bed Mobility Limitations cognitive deficits   Impairments Contributing to Impaired Bed Mobility decreased ROM;decreased strength   Assistive Device (Bed Mobility) bed rails   Clinical Impression   Criteria for Skilled Therapeutic Intervention Yes, treatment indicated   PT Diagnosis (PT) Impaired Functional Mobility   Influenced by the following impairments weakness, limited ROM   Functional limitations due to impairments gait, transfers   Clinical Presentation (PT Evaluation Complexity) Evolving/Changing   Clinical Presentation Rationale pt presents as  medically diagnosed   Clinical Decision Making (Complexity) moderate complexity   Planned Therapy Interventions (PT) home exercise program;gait training;ROM (range of motion);strengthening;transfer training   Anticipated Equipment Needs at Discharge (PT) walker, rolling   Risk & Benefits of therapy have been explained evaluation/treatment results reviewed;patient   PT Total Evaluation Time   PT Eval, Moderate Complexity Minutes (28758) 10   Plan of Care Review   Plan of Care Reviewed With patient   Therapy Certification   Start of care date 09/20/23   Certification date from 09/20/23   Certification date to 10/20/23   Medical Diagnosis s/p L TKA   Physical Therapy Goals   PT Frequency 2x/day   PT Predicted Duration/Target Date for Goal Attainment 09/22/23   PT Goals Gait;Transfers;PT Goal 1   PT: Transfers Supervision/stand-by assist;Sit to/from stand;Within precautions   PT: Gait Supervision/stand-by assist;Rolling walker;25 feet;Within precautions   PT: Goal 1 Independent with HEP   Interventions   Interventions Quick Adds Therapeutic Activity   Therapeutic Activity   Therapeutic Activities: dynamic activities to improve functional performance Minutes (32833) 15   Symptoms Noted During/After Treatment Fatigue   Treatment Detail/Skilled Intervention Rolling Left and Right: Increased verbal and visual cueing for safety/technique/attention to task, difficulty following one step commands, CGA, use of bed rails for assitance. Increased time d/t difficulty following one step commands and confusion. Pt was falling asleep mid conversation during session.   PT Discharge Planning   PT Plan Gait, Transfers, TKA HEP   PT Discharge Recommendation (DC Rec)   (defer to ortho)   Total Session Time   Timed Code Treatment Minutes 15   Total Session Time (sum of timed and untimed services) 25     M [facility]  OUTPATIENT PHYSICAL THERAPY EVALUATION  PLAN OF TREATMENT FOR OUTPATIENT  REHABILITATION  (COMPLETE FOR INITIAL CLAIMS ONLY)  Patient's Last Name, First Name, M.I.  YOB: 1965  Ivan Bell                        Provider's Name  [facility] Medical Record No.  [id number]                             Onset Date:  09/19/23   Start of Care Date:  09/20/23   Type:     _X_PT   ___OT   ___SLP Medical Diagnosis:  s/p L TKA              PT Diagnosis:  Impaired Functional Mobility Visits from SOC:  1     See note for plan of treatment, functional goals and certification details    I CERTIFY THE NEED FOR THESE SERVICES FURNISHED UNDER        THIS PLAN OF TREATMENT AND WHILE UNDER MY CARE     (Physician co-signature of this document indicates review and certification of the therapy plan).

## 2023-09-20 NOTE — DISCHARGE SUMMARY
"ORTHOPEDIC DISCHARGE SUMMARY       Ivan Bell,  1965, MRN [id number]    Admission Date: 2023      Admission Diagnoses: Osteoarthritis of left knee [M17.12]  Knee osteoarthritis [M17.9]     Discharge Date:  23     Post-operative Day:  1 Day Post-Op    Reason for Admission: The patient was admitted for the following: Procedure(s):  LEFT TOTAL KNEE ARTHROPLASTY    BRIEF HOSPITAL COURSE   Ivan Bell is a pleasant 58 year old male who underwent the aforementioned procedure with Dr. Landaverde on . There were no intraoperative complications and the patient was transferred to the recovery room and later the orthopedic unit in stable condition. Once the patient reached the orthopedic floor our orthopedic pain protocol was implemented along with the following:    Anticoagulation Medications: None  Therapy: PT and OT  Activity: WBAT  Bracing: None    Consultations during Admission: Hospitalist service for medical management     COMPLICATIONS/SIGNIFICANT FINDINGS    NONE    DISCHARGE INFORMATION   Condition at discharge: Good  Discharge destination: Home  Patient was seen by myself on the date of discharge.    FOLLOW UP CARE   Follow up with orthopedics in 2 weeks or sooner should the need arise. Ortho will continue to manage pain control, post op anticoagulation and incision care.     Follow up with your PCP for management of chronic medical problems and to evaluate for post op medical complications including constipation, nausea/vomiting, DVT/PE, anemia, changes in blood pressure, fevers/chills, urinary retention and atelectasis/pneumonia.     Subjective   Patient is doing well on POD #1. Pain is well controlled with oral medications. Ambulating. Tolerating oral intake.     Physical Exam   BP (!) 166/73 (BP Location: Right arm, Patient Position: Semi-Lozada's, Cuff Size: Adult Regular)   Pulse 79   Temp 97.6  F (36.4  C) (Oral)   Resp 18   Ht 1.803 m (5' 11\")   Wt 84.8 kg (187 lb)   SpO2 " 90%   BMI 26.08 kg/m    The patient is A&Ox3. Appears comfortable, sitting up at bedside.  Sensation is intact to light touch & equal bilaterally in the L2 through S1 dermatomes.  Calves are soft and non-tender.  Dorsiflexion and plantar flexion is intact bilaterally.  Appropriate flexion and extension of the toes bilaterally.   Brisk capillary refill in the toes bilaterally.   Palpable left dorsalis pedis pulse.  left knee dressing C/D/I.    Pertinent Results at Discharge     Hemoglobin   Date/Time Value Ref Range Status   09/20/2023 04:50 AM 10.0 (L) 13.3 - 17.7 g/dL Final   09/19/2023 07:13 PM 10.5 (L) 13.3 - 17.7 g/dL Final   09/19/2023 09:50 AM 10.9 (L) 13.3 - 17.7 g/dL Final   06/23/2021 06:05 PM 12.1 (L) 13.3 - 17.7 g/dL Final   03/26/2021 02:22 PM 11.0 (L) 13.3 - 17.7 g/dL Final   02/11/2021 07:57 AM 11.8 (L) 13.3 - 17.7 g/dL Final     INR   Date/Time Value Ref Range Status   09/20/2023 04:50 AM 1.67 (H) 0.85 - 1.15 Final   09/19/2023 09:50 AM 1.70 (H) 0.85 - 1.15 Final   09/13/2023 11:24 AM 1.65 (H) 0.85 - 1.15 Final   06/23/2021 06:05 PM 1.79 (H) 0.86 - 1.14 Final   02/11/2021 07:57 AM 1.48 (H) 0.86 - 1.14 Final   11/02/2020 08:50 AM 1.77 (H) 0.86 - 1.14 Final     Platelet Count   Date/Time Value Ref Range Status   09/20/2023 04:50 AM 52 (L) 150 - 450 10e3/uL Final   09/19/2023 07:13 PM 58 (L) 150 - 450 10e3/uL Final   09/19/2023 09:50 AM 55 (L) 150 - 450 10e3/uL Final   06/23/2021 06:05 PM 58 (L) 150 - 450 10e9/L Final   03/26/2021 02:22 PM 53 (L) 150 - 450 10e9/L Final     Comment:     Results confirmed by repeat test   02/11/2021 07:57 AM 52 (L) 150 - 450 10e9/L Final       Problem List   Principal Problem:    Knee osteoarthritis      Josefina Jacob PA-C/Dr. Landaverde  [facility]  [phone]  Date: 9/20/2023  Time: 9:06 AM

## 2023-09-20 NOTE — PROGRESS NOTES
Care Management Follow Up    Length of Stay (days): 0    Expected Discharge Date: 09/21/2023     Concerns to be Addressed: Hypersomnolence       Patient plan of care discussed at interdisciplinary rounds: Yes    Anticipated Discharge Disposition: Home     Anticipated Discharge Services: None    Anticipated Discharge DME: None     Education Provided on the Discharge Plan: Yes (AVS per bedside RN)    Patient/Family in Agreement with the Plan: yes        Additional Information:  CM reviewed chart. NO CM needs identified or requested by Ortho team. Family to provide transportation home at discharge.     Swetha Oliver RN

## 2023-09-20 NOTE — PROGRESS NOTES
PRIMARY DIAGNOSIS: S/P Total Knee Arthroplasty, Left  OUTPATIENT/OBSERVATION GOALS TO BE MET BEFORE DISCHARGE:  1. Stable vital signs Yes  2. Tolerating diet:Yes Pt had pop libby and maria crackers during the night.   3. Pain controlled with oral pain medications:  Yes  4. Positive bowel sounds:  Yes  5. Voiding without difficulty:  Yes  6. Able to ambulate:  Yes Pt ambulates to the bathroom wt walker, SBA.  7. Provider specific discharge goals met:  Yes    Discharge Planner Nurse   Safe discharge environment identified: Yes  Barriers to discharge: No       Entered by: Nargis Galindo RN 09/20/2023 8:01 AM     Dressing clean, dry and intact. Hemovac draining bloody red secretions. Output of 20ml overnight. Blood sugar stable.

## 2023-09-20 NOTE — PROGRESS NOTES
"   09/20/23 0945   Appointment Info   Signing Clinician's Name / Credentials (OT) Eula Rodriguez OTR/L OTD   Quick Adds   Quick Adds Certification   Living Environment   People in Home spouse   Current Living Arrangements house   Home Accessibility stairs to enter home   Number of Stairs, Main Entrance 1   Living Environment Comments Pt reports walk in shower and raised toilet seat   Self-Care   Equipment Currently Used at Home none   Fall history within last six months no   Activity/Exercise/Self-Care Comment Pt reports ind with all ADLs and IADLs - pt demos some poor history giving d/t lethargy   General Information   Onset of Illness/Injury or Date of Surgery 09/19/23   Referring Physician José Miguel Landaverde MD   Patient/Family Therapy Goal Statement (OT) To \"wake up\"   Additional Occupational Profile Info/Pertinent History of Current Problem Ivan Bell is a 58 year old male with a PMH of alcoholic cirrhosis sober since 2015, HTN, CKD 2, thrombocytopenia, hypertriglyceridemia, depression, gout.  Underwent left total knee arthroplasty.   Existing Precautions/Restrictions (S)  fall;other (see comments)  (0-75 degrees ROM for 3 days post-op)   Limitations/Impairments safety/cognitive  (currently limited by min confusion and lethargy)   Left Lower Extremity (Weight-bearing Status) weight-bearing as tolerated (WBAT)   Cognitive Status Examination   Orientation Status person;place   Affect/Mental Status (Cognitive) low arousal/lethargic;confused   Follows Commands increased processing time needed;repetition of directions required;physical/tactile prompts required;verbal cues/prompting required   Cognitive Status Comments Pt very lethargic throughout session, in and out of sleep, required cueing to stay awake during session   Visual Perception   Visual Impairment/Limitations WFL   Posture   Posture not impaired   Range of Motion Comprehensive   General Range of Motion bilateral upper extremity ROM WFL   Strength " Comprehensive (MMT)   General Manual Muscle Testing (MMT) Assessment no strength deficits identified   Bed Mobility   Bed Mobility supine-sit   Supine-Sit [place] (Bed Mobility) supervision   Transfers   Transfers sit-stand transfer;toilet transfer   Sit-Stand Transfer   Sit-Stand [place] (Transfers) minimum assist (75% patient effort)   Assistive Device (Sit-Stand Transfers) walker, front-wheeled   Toilet Transfer   [place] Level (Toilet Transfer) contact guard   Assistive Device (Toilet Transfer) grab bars/safety frame   Balance   Balance Assessment standing balance: dynamic   Balance Comments Fair - d/t lethargy   Activities of Daily Living   BADL Assessment/Intervention lower body dressing;toileting   Lower Body Dressing Assessment/Training   Position (Lower Body Dressing) edge of bed sitting   [place] Level (Lower Body Dressing) maximum assist (25% patient effort)   Toileting   [place] Level (Toileting) minimum assist (75% patient effort)   Clinical Impression   Criteria for Skilled Therapeutic Interventions Met (OT) Yes, treatment indicated   OT Diagnosis Decreased ind with ADLs and safety   Influenced by the following impairments S/p TKA   OT Problem List-Impairments impacting ADL activity tolerance impaired;balance;cognition;strength;pain;post-surgical precautions   Assessment of Occupational Performance 3-5 Performance Deficits   Identified Performance Deficits dressing, toileting, bathing, fxl transfers/bed mobility, safety   Planned Therapy Interventions (OT) ADL retraining;balance training;bed mobility training;cognition;transfer training;risk factor education   Clinical Decision Making Complexity (OT) moderate complexity   Risk & Benefits of therapy have been explained evaluation/treatment results reviewed;care plan/treatment goals reviewed;risks/benefits reviewed;current/potential barriers reviewed;patient   OT Total Evaluation Time   OT Eval, Moderate Complexity Minutes  (95007) 10   Therapy Certification   Medical Diagnosis S/p TKA   Start of Care Date 09/19/23   Certification date from 09/20/23   Certification date to 10/20/23   OT Goals   Therapy Frequency (OT) Daily   OT Predicted Duration/Target Date for Goal Attainment 09/27/23   OT Goals Lower Body Dressing;Hygiene/Grooming;Bed Mobility;Toilet Transfer/Toileting   OT: Hygiene/Grooming supervision/stand-by assist;while standing   OT: Lower Body Dressing Supervision/stand-by assist;within precautions   OT: Bed Mobility Supervision/stand-by assist   OT: Toilet Transfer/Toileting Supervision/stand-by assist;toilet transfer;cleaning and garment management   Interventions   Interventions Quick Adds Self-Care/Home Management   Self-Care/Home Management   Self-Care/Home Mgmt/ADL, Compensatory, Meal Prep Minutes (36966) 14   Symptoms Noted During/After Treatment (Meal Preparation/Planning Training) fatigue;other (see comments)  (lethargy)   Treatment Detail/Skilled Intervention Evaluation completed, treatment initiated. Pt required frequent cueing throughout for attention to task and safety. STS min A x1 w/ FWW, cuein for hand placement. Fxl mob/tofrom bathroom CGA x2, mod A for walker advancement, pt attempting to doze off during mobility. Toilet transfer min A, mod A for garment mgmt. Extended time for toileting, unable to correctly aim into toilet, unaware of urinating on floor. STS CGA x1. Returned to bedside chair CGA x2, min A for safety with chair transfer. Call light near and chair alarm on.   OT Discharge Planning   OT Plan ROM of knee 0-75 degrees 3 days post op - Progress transfers, monitor cognition, LB dressing w/ AE, toileting   OT Discharge Recommendation (DC Rec) home with home care occupational therapy;home with assist   OT Rationale for DC Rec Pending clearing of cognition, pt will be safe to return home with assist from spouse. Pt currently limited by lethargy. If cognition does not clear, recommend TCU for safety    OT Brief overview of current status CGAx2 for transfers/safety   Total Session Time   Timed Code Treatment Minutes 14   Total Session Time (sum of timed and untimed services) 24    M [facility]  OUTPATIENT OCCUPATIONAL THERAPY  EVALUATION  PLAN OF TREATMENT FOR OUTPATIENT REHABILITATION  (COMPLETE FOR INITIAL CLAIMS ONLY)  Patient's Last Name, First Name, M.I.  YOB: 1965  Ivan Bell                          Provider's Name  [facility] Medical Record No.  [id number]                             Onset Date:  09/19/23   Start of Care Date:  09/19/23   Type:     ___PT   _X_OT   ___SLP Medical Diagnosis:  S/p TKA                    OT Diagnosis:  Decreased ind with ADLs and safety Visits from SOC:  1     See note for plan of treatment, functional goals and certification details    I CERTIFY THE NEED FOR THESE SERVICES FURNISHED UNDER        THIS PLAN OF TREATMENT AND WHILE UNDER MY CARE     (Physician co-signature of this document indicates review and certification of the therapy plan).

## 2023-09-20 NOTE — PROGRESS NOTES
[facility] MEDICINE  PROGRESS NOTE     Code Status: Full Code  Procedure(s):  LEFT TOTAL KNEE ARTHROPLASTY  1 Day Post-Op  Identification/Summary:   Ivan Bell is a 58 year old male with a PMH of alcoholic cirrhosis sober since 2015, HTN, CKD 2, thrombocytopenia, hypertriglyceridemia, depression, gout.  Underwent left total knee arthroplasty.  Postoperatively very somnolent after receiving his pain medications.  Changing lactulose to scheduled.  Decreased narcotic pain medications and ordered scheduled Tylenol.  Not medically cleared for discharge today.  Updated wife via telephone.     Assessment and Plan:     Status post left total knee arthroplasty  Postoperative management per orthopedics.  Somnolence  Likely etiology from both his pain medications and potential hyperammonemia.  Decrease his Dilaudid by one half.  Ordered Tylenol scheduled.  Change lactulose to scheduled 3 times daily.  Monitor stool frequency closely.  Not medically clear for discharge.  Acute on chronic blood loss anemia  Preoperative hemoglobin 10.9 then down to 10.5 and recheck at 10.  No indication for transfusion at this time.  Alcoholic cirrhosis with ascites  History of TIPS procedure  Sober from alcohol since 2015.  States has been taking his medications regularly.  Continue home Lasix 40 mg a.m. 20 mg p.m.    Continue potassium 40 mill equivalents daily.  As above transition his lactulose from as needed to scheduled dosing.  Monitor stool frequency closely.  Depression  Continue Zoloft.  Thrombocytopenia  Preoperative platelets 55.  Postoperatively platelets are stable.  Chronic kidney disease stage II  Recheck BMP shows stable kidney function.  Creatinine 1.34.  Hyperglycemia  A.m. after surgery glucose was up to 174.  A1c was down to 4.8.  Recommend annual fasting glucose testing.     Anticoagulation.  Elevated INR  Preop INR up to 1.7.  Anticoagulation per surgery.  SCDs and ambulation.  Hold  "his vitamin K at this time.     Fluids: Per surgery  Pain meds: Per surgery  Therapy: Per surgery   Blue:Not present  Lines: None       Current Diet  Orders Placed This Encounter      Discharge Instruction - Regular Diet Adult      Advance Diet as Tolerated: Regular Diet Adult    Supplements  None    Barriers to Discharge: Hypersomnolence    Disposition: To be determined.  Hopefully 9/21.    Clinically Significant Risk Factors Present on Admission               # Coagulation Defect: INR = 1.67 (Ref range: 0.85 - 1.15) and/or PTT = N/A, will monitor for bleeding  # Thrombocytopenia: Lowest platelets = 52 in last 2 days, will monitor for bleeding   # Hypertension: Noted on problem list      # Overweight: Estimated body mass index is 26.08 kg/m  as calculated from the following:    Height as of this encounter: 1.803 m (5' 11\").    Weight as of this encounter: 84.8 kg (187 lb).              Interval History/Subjective:  Patient significant Jasper somnolent this morning.  Does awaken to voice but quickly falls back asleep.  Did receive a total of 6 mg of Dilaudid this morning since 0100 hrs.  Is not had any recorded bowel movements.  When he does awaken denies any chest pain or shortness of breath.  Was able to ambulate to the bathroom.  No family present.  Updated wife Elicia via telephone.  Questions answered to verbalized satisfaction.      Last 24H PRN:     hydrALAZINE (APRESOLINE) injection 10 mg, 10 mg at 09/19/23 1956    HYDROmorphone (DILAUDID) tablet 2 mg, 2 mg at 09/20/23 0518 **OR** HYDROmorphone (DILAUDID) tablet 4 mg, 4 mg at 09/20/23 0108    sodium chloride (PF) 0.9% PF flush 3 mL, 3 mL at 09/20/23 0110    Physical Exam/Objective:  Temp:  [97.3  F (36.3  C)-99.8  F (37.7  C)] 97.6  F (36.4  C)  Pulse:  [69-99] 79  Resp:  [12-25] 17  BP: (137-209)/(66-83) 160/75  SpO2:  [78 %-100 %] 96 %  Wt Readings from Last 4 Encounters:   09/19/23 84.8 kg (187 lb)   09/13/23 85 kg (187 lb 6.4 oz)   05/10/23 81 kg (178 lb 9.6 " oz)   04/24/23 83.7 kg (184 lb 8 oz)     Body mass index is 26.08 kg/m .    Constitutional: fatigued, somnolent, cooperative, rapidly falls back asleep, no apparent distress, appears stated age, and mildly obese  ENT: Normocephalic, without obvious abnormality, atraumatic, external ears without lesions, oral pharynx with moist mucous membranes, tonsils without erythema or exudates, gums normal and good dentition.  Respiratory: No increased work of breathing, good air exchange, clear to auscultation bilaterally, no crackles or wheezing  Cardiovascular: Normal apical impulse, regular rate and rhythm, normal S1 and S2, no S3 or S4, and no murmur noted  GI: No scars, normal bowel sounds, soft, non-distended, non-tender, no masses palpated, no hepatosplenomegally  Skin: normal skin color, texture, turgor, no redness, warmth, or swelling, and no rashes  Musculoskeletal: There is no redness, warmth, or swelling of the joints.  Motor strength Tone is normal. no lower extremity pitting edema present  Neurologic: Cranial nerves II-XII are grossly intact. Sensory:  Sensory intact  Neuropsychiatric: General: normal, calm, and poor eye contact Level of consciousness: Drowsy/ normal Affect: normal Orientation: oriented to self, place, time and situation Memory and insight: normal, memory for past and recent events intact, and thought process normal      Medications:   Personally Reviewed.  Medications    lactated ringers 75 mL/hr at 09/20/23 0900      furosemide  20 mg Oral QPM    furosemide  40 mg Oral QAM    multivitamin, therapeutic  1 tablet Oral Daily    polyethylene glycol  17 g Oral Daily    potassium chloride ER  40 mEq Oral Daily    senna-docusate  1 tablet Oral BID    sertraline  25 mg Oral Daily    sodium chloride (PF)  3 mL Intracatheter Q8H    vitamin D3  50 mcg Oral Daily    [Held by provider] Vitamin K2  200 mcg Oral Daily       Data reviewed today: I personally reviewed all new medications, labs,  imaging/diagnostics reports over the past 24 hours. Pertinent findings include:    Imaging:   No results found for this or any previous visit (from the past 24 hour(s)).    Labs:  POC US Guidance Needle Placement   Final Result        Recent Results (from the past 24 hour(s))   Prepare plasma (unit)    Collection Time: 09/19/23 11:03 AM   Result Value Ref Range    Blood Component Type Plasma     Product Code G4922I52     Unit Status Transfused     Unit Number [id number]     CODING SYSTEM [id number]     ISSUE DATE AND TIME [id number]     UNIT ABO/RH A-     UNIT TYPE ISBT 0600    CBC with platelets    Collection Time: 09/19/23  7:13 PM   Result Value Ref Range    WBC Count 2.4 (L) 4.0 - 11.0 10e3/uL    RBC Count 3.27 (L) 4.40 - 5.90 10e6/uL    Hemoglobin 10.5 (L) 13.3 - 17.7 g/dL    Hematocrit 31.2 (L) 40.0 - 53.0 %    MCV 95 78 - 100 fL    MCH 32.1 26.5 - 33.0 pg    MCHC 33.7 31.5 - 36.5 g/dL    RDW 16.0 (H) 10.0 - 15.0 %    Platelet Count 58 (L) 150 - 450 10e3/uL   INR    Collection Time: 09/20/23  4:50 AM   Result Value Ref Range    INR 1.67 (H) 0.85 - 1.15   CBC with platelets    Collection Time: 09/20/23  4:50 AM   Result Value Ref Range    WBC Count 5.3 4.0 - 11.0 10e3/uL    RBC Count 3.17 (L) 4.40 - 5.90 10e6/uL    Hemoglobin 10.0 (L) 13.3 - 17.7 g/dL    Hematocrit 30.2 (L) 40.0 - 53.0 %    MCV 95 78 - 100 fL    MCH 31.5 26.5 - 33.0 pg    MCHC 33.1 31.5 - 36.5 g/dL    RDW 15.7 (H) 10.0 - 15.0 %    Platelet Count 52 (L) 150 - 450 10e3/uL   Basic metabolic panel    Collection Time: 09/20/23  4:50 AM   Result Value Ref Range    Sodium 139 136 - 145 mmol/L    Potassium 3.8 3.4 - 5.3 mmol/L    Chloride 110 (H) 98 - 107 mmol/L    Carbon Dioxide (CO2) 24 22 - 29 mmol/L    Anion Gap 5 (L) 7 - 15 mmol/L    Urea Nitrogen 15.1 8.0 - 23.0 mg/dL    Creatinine 1.34 (H) 0.67 - 1.17 mg/dL    Calcium 8.7 (L) 8.8 - 10.2 mg/dL    Glucose 174 (H) 70 - 99 mg/dL    GFR Estimate 61 >60 mL/min/1.73m2   Hemoglobin A1c    Collection  Time: 09/20/23  4:50 AM   Result Value Ref Range    Hemoglobin A1C 4.8 <5.7 %   Glucose by meter    Collection Time: 09/20/23  6:19 AM   Result Value Ref Range    GLUCOSE BY METER POCT 157 (H) 70 - 99 mg/dL       Pending Labs:  Unresulted Labs Ordered in the Past 30 Days of this Admission       No orders found for last 31 day(s).              Tommy Foreman MD  [facility]  Phone: #[phone]

## 2023-09-21 ENCOUNTER — APPOINTMENT (OUTPATIENT)
Dept: PHYSICAL THERAPY | Facility: CLINIC | Age: 58
End: 2023-09-21
Attending: ORTHOPAEDIC SURGERY
Payer: COMMERCIAL

## 2023-09-21 ENCOUNTER — APPOINTMENT (OUTPATIENT)
Dept: OCCUPATIONAL THERAPY | Facility: CLINIC | Age: 58
End: 2023-09-21
Attending: ORTHOPAEDIC SURGERY
Payer: COMMERCIAL

## 2023-09-21 LAB
FASTING STATUS PATIENT QL REPORTED: ABNORMAL
GLUCOSE SERPL-MCNC: 106 MG/DL (ref 70–99)

## 2023-09-21 PROCEDURE — 82947 ASSAY GLUCOSE BLOOD QUANT: CPT | Performed by: ORTHOPAEDIC SURGERY

## 2023-09-21 PROCEDURE — 97110 THERAPEUTIC EXERCISES: CPT | Mod: GP

## 2023-09-21 PROCEDURE — 99214 OFFICE O/P EST MOD 30 MIN: CPT | Performed by: FAMILY MEDICINE

## 2023-09-21 PROCEDURE — 97535 SELF CARE MNGMENT TRAINING: CPT | Mod: GO

## 2023-09-21 PROCEDURE — 97116 GAIT TRAINING THERAPY: CPT | Mod: GP

## 2023-09-21 PROCEDURE — 97530 THERAPEUTIC ACTIVITIES: CPT | Mod: GP

## 2023-09-21 PROCEDURE — 250N000013 HC RX MED GY IP 250 OP 250 PS 637: Performed by: ORTHOPAEDIC SURGERY

## 2023-09-21 PROCEDURE — 250N000013 HC RX MED GY IP 250 OP 250 PS 637: Performed by: FAMILY MEDICINE

## 2023-09-21 PROCEDURE — 36415 COLL VENOUS BLD VENIPUNCTURE: CPT | Performed by: ORTHOPAEDIC SURGERY

## 2023-09-21 RX ORDER — HYDROCODONE BITARTRATE AND ACETAMINOPHEN 5; 325 MG/1; MG/1
1 TABLET ORAL EVERY 6 HOURS PRN
Status: DISCONTINUED | OUTPATIENT
Start: 2023-09-21 | End: 2023-09-22 | Stop reason: HOSPADM

## 2023-09-21 RX ORDER — HYDROXYZINE HYDROCHLORIDE 10 MG/1
10 TABLET, FILM COATED ORAL EVERY 6 HOURS PRN
Status: DISCONTINUED | OUTPATIENT
Start: 2023-09-21 | End: 2023-09-22 | Stop reason: HOSPADM

## 2023-09-21 RX ADMIN — HYDROXYZINE HYDROCHLORIDE 25 MG: 25 TABLET, FILM COATED ORAL at 01:35

## 2023-09-21 RX ADMIN — SENNOSIDES AND DOCUSATE SODIUM 1 TABLET: 50; 8.6 TABLET ORAL at 22:13

## 2023-09-21 RX ADMIN — ACETAMINOPHEN 650 MG: 325 TABLET ORAL at 13:41

## 2023-09-21 RX ADMIN — FUROSEMIDE 20 MG: 20 TABLET ORAL at 22:17

## 2023-09-21 RX ADMIN — LACTULOSE 20 G: 10 SOLUTION ORAL at 13:41

## 2023-09-21 RX ADMIN — SENNOSIDES AND DOCUSATE SODIUM 1 TABLET: 50; 8.6 TABLET ORAL at 07:58

## 2023-09-21 RX ADMIN — POTASSIUM CHLORIDE 40 MEQ: 1500 TABLET, EXTENDED RELEASE ORAL at 07:58

## 2023-09-21 RX ADMIN — ACETAMINOPHEN 650 MG: 325 TABLET ORAL at 08:00

## 2023-09-21 RX ADMIN — FUROSEMIDE 40 MG: 40 TABLET ORAL at 07:58

## 2023-09-21 RX ADMIN — ACETAMINOPHEN 650 MG: 325 TABLET ORAL at 22:13

## 2023-09-21 RX ADMIN — SERTRALINE HYDROCHLORIDE 25 MG: 25 TABLET, FILM COATED ORAL at 07:58

## 2023-09-21 RX ADMIN — Medication 50 MCG: at 07:58

## 2023-09-21 RX ADMIN — THERA TABS 1 TABLET: TAB at 07:58

## 2023-09-21 RX ADMIN — POLYETHYLENE GLYCOL 3350 17 G: 17 POWDER, FOR SOLUTION ORAL at 07:59

## 2023-09-21 RX ADMIN — LACTULOSE 20 G: 10 SOLUTION ORAL at 08:00

## 2023-09-21 ASSESSMENT — ACTIVITIES OF DAILY LIVING (ADL)
ADLS_ACUITY_SCORE: 27
ADLS_ACUITY_SCORE: 29
ADLS_ACUITY_SCORE: 29
ADLS_ACUITY_SCORE: 28
ADLS_ACUITY_SCORE: 27
ADLS_ACUITY_SCORE: 27
ADLS_ACUITY_SCORE: 29
ADLS_ACUITY_SCORE: 28
ADLS_ACUITY_SCORE: 27

## 2023-09-21 NOTE — PROGRESS NOTES
"Orthopedic Progress Note      Assessment: 2 Day Post-Op  S/P Procedure(s):  LEFT TOTAL KNEE ARTHROPLASTY     Plan:   - Continue PT/OT.   - Weightbearing status: WBAT.  - Anticoagulation: none in addition to SCDs, denilson stockings and early ambulation.  - Discharge planning: pending clearance in somnolence and passing therapy     Subjective:  Pain: Well controlled on Tylenol & oxycodone.  Nausea, Vomiting:  No  Chest pain: No  Lightheadedness, Dizziness:  No  Neuro:  Patient denies new onset numbness or paresthesias in left lower extremity     Patient is doing well on POD #2. Ambulating, tolerating oral intake, voiding & pain is controlled with oral medication.  Hgb 10 (pre-op 10.5).  Patient has been very somnolent and difficult to keep awake during therapies and conversation.  Hospitalist as increased his lactulose as well as decreased pain medicines to see if this helps with his somnolence.  He is mildly more awake today but still somnolent.  We will continue to monitor and adjust meds as necessary so that he can go home likely tomorrow     Objective:  BP (!) 160/74 (BP Location: Right arm)   Pulse 75   Temp 98.2  F (36.8  C) (Oral)   Resp 18   Ht 1.803 m (5' 11\")   Wt 84.8 kg (187 lb)   SpO2 96%   BMI 26.08 kg/m    The patient is A&Ox3. Appears comfortable, sitting up at bedside.  Sensation is intact to light touch & equal bilaterally in the L2 through S1 dermatomes.  Calves are soft and non-tender.  Dorsiflexion and plantar flexion is intact bilaterally.  Appropriate flexion and extension of the toes bilaterally.   Brisk capillary refill in the toes bilaterally.   Palpable left dorsalis pedis pulse.  left knee dressing C/D/I.      Pertinent Labs   Lab Results: personally reviewed.   Lab Results   Component Value Date    INR 1.67 (H) 09/20/2023    INR 1.70 (H) 09/19/2023    INR 1.65 (H) 09/13/2023     Lab Results   Component Value Date    WBC 5.3 09/20/2023    HGB 10.0 (L) 09/20/2023    HCT 30.2 (L) " 09/20/2023    MCV 95 09/20/2023    PLT 52 (L) 09/20/2023     Lab Results   Component Value Date     09/20/2023    CO2 24 09/20/2023         Report completed by:  Josefina Jacob PA-C/Dr. Landaverde  [facility]  09/21/23

## 2023-09-21 NOTE — PLAN OF CARE
Problem: Obstructive Sleep Apnea Risk or Actual Comorbidity Management  Goal: Unobstructed Breathing During Sleep  Outcome: Not Progressing  Intervention: Monitor and Manage Obstructive Sleep Apnea  Recent Flowsheet Documentation  Taken 9/20/2023 1645 by Mely Trinh RN  Medication Review/Management: medications reviewed     Problem: Knee Arthroplasty  Goal: Optimal Coping  Outcome: Progressing  Goal: Absence of Bleeding  Outcome: Progressing  Goal: Fluid and Electrolyte Balance  Outcome: Progressing  Goal: Optimal Functional Ability  Outcome: Progressing  Intervention: Promote Optimal Functional Status  Recent Flowsheet Documentation  Taken 9/20/2023 2145 by Mely Trinh RN  Assistive Device Utilized:   gait belt   walker  Activity Management: back to bed  Taken 9/20/2023 2132 by Mely Trinh RN  Assistive Device Utilized:   gait belt   walker  Activity Management: ambulated to bathroom  Taken 9/20/2023 1645 by Mely Trinh RN  Activity Management: up in chair  Goal: Absence of Infection Signs and Symptoms  Outcome: Progressing  Goal: Intact Neurovascular Status  Outcome: Progressing  Goal: Anesthesia/Sedation Recovery  Outcome: Progressing  Intervention: Optimize Anesthesia Recovery  Recent Flowsheet Documentation  Taken 9/20/2023 1645 by Mely Trinh, RN  Safety Promotion/Fall Prevention:   activity supervised   clutter free environment maintained   increased rounding and observation   increase visualization of patient   lighting adjusted   mobility aid in reach   nonskid shoes/slippers when out of bed   patient and family education   room door open   room near nurse's station   room organization consistent   safety round/check completed   supervised activity   treat reversible contributory factors   treat underlying cause  Goal: Acceptable Pain Control  Outcome: Progressing  Intervention: Prevent or Manage Pain  Recent Flowsheet Documentation  Taken 9/20/2023 1540 by Mely Trinh  RN  Pain Management Interventions: rest  Goal: Nausea and Vomiting Relief  Outcome: Progressing  Goal: Effective Urinary Elimination  Outcome: Progressing  Goal: Effective Oxygenation and Ventilation  Outcome: Progressing  Intervention: Optimize Oxygenation and Ventilation  Recent Flowsheet Documentation  Taken 9/20/2023 2145 by Mely Trinh RN  Head of Bed (HOB) Positioning: HOB at 20-30 degrees     Patient vital signs are at baseline: Yes, able to titrate off O2 while alert & awake; Running hypertensive; Adm PRN IV Hydralazine 10 mg w/ effective results  Patient able to ambulate as they were prior to admission or with assist devices provided by therapies during their stay:  No,  Reason:  Up w/ an assist of 2; Pt is fairly shaky & unsteady when ambulating; Requiring extensive cueing  Patient MUST void prior to discharge:  Yes, but bladder scanned for 429 ml  Patient able to tolerate oral intake:  Yes  Pain has adequate pain control using Oral analgesics:  Yes  Does patient have an identified :  Yes  Has goal D/C date and time been discussed with patient:  Yes     Pt is oriented x 4 but has been largely somnolent for the majority of the shift. Arousable to gentle, repeated & consistent physical stimulation until 2100 but became arousable, using only voice & opening his eyes spontaneously w/o stimulation at 2200. Pt had a difficulty comprehending directions & requiring intense cueing when ambulating to the bathroom. Pt required an assist of 2 w/ walker & gait belt as he was exceedingly shakey & unsteady when ambulating.     Pt denies pain during today's shift. Utilizing scheduled APAP w/ effective results. Gave one dose of PRN Lactulose. Awaiting results. Dressing to surgical incision has a small amount of sanguinous drainage present. CMS intact to BLE. Moderate edema noted to bilateral hands. Voiding spontaneously; Bladder scanned pt for 429 ml upon urination. Monitoring for need to straight cath. LR  running at 75 ml. Tolerating a regular diet but only ate a popsicle this shift d/t lethargy. VSS; Able to titrate off O2 while alert & awake; Also running hypertensive; Adm PRN IV Hydralazine 10 mg w/ effective results. Anticipating discharge tomorrow pending improved level of consciousness & PT/OT.      ANYA Wing  Shift: [id number] - [id number]

## 2023-09-21 NOTE — PROGRESS NOTES
[facility] MEDICINE  PROGRESS NOTE     Code Status: Full Code  Procedure(s):  LEFT TOTAL KNEE ARTHROPLASTY  2 Days Post-Op  Identification/Summary:   Ivan Bell is a 58 year old male with a PMH of alcoholic cirrhosis sober since 2015, HTN, CKD 2, thrombocytopenia, hypertriglyceridemia, depression, gout.  Underwent left total knee arthroplasty.  Postoperatively very somnolent after receiving his pain medications.  Changing lactulose to scheduled.  Decreased pain medications and ordered scheduled Tylenol.  Alertness improving but not yet back to baseline.  Continue lactulose and minimize pain medications.  Not medically cleared for discharge today.  Hopeful discharge 9/22.     Assessment and Plan:     Status post left total knee arthroplasty  Postoperative management per orthopedics.  Somnolence  Likely etiology from both his pain medications and potential hyperammonemia.  Decrease his Dilaudid and Atarax  Continue Tylenol scheduled.  Change lactulose to scheduled 3 times daily.  Monitor stool frequency closely.  Increase lactulose dose to assist with stools.  9/21 improved alertness but still somnolent.  Anticipate continued slow improvement.  Not medically clear for discharge yet.  Acute on chronic blood loss anemia  Preoperative hemoglobin 10.9 then down to 10.5 and recheck at 10.  No indication for transfusion at this time.  Alcoholic cirrhosis with ascites  History of TIPS procedure  Sober from alcohol since 2015.  States has been taking his medications regularly.  Continue home Lasix 40 mg a.m. 20 mg p.m.    Continue potassium 40 mill equivalents daily.  As above transition his lactulose from as needed to scheduled dosing.  Monitor stool frequency closely.  Goal is 2-3 bowel movements per day.  Depression  Continue Zoloft.  Thrombocytopenia  Preoperative platelets 55.  Postoperatively platelets are stable.  Chronic kidney disease stage II  Recheck BMP shows stable kidney  "function.  Creatinine 1.34.  Hyperglycemia  A.m. after surgery glucose was up to 174.  A1c was down to 4.8.  Recommend annual fasting glucose testing.     Anticoagulation.  Elevated INR  Preop INR up to 1.7.  Anticoagulation per surgery.  SCDs and ambulation.  Hold his vitamin K at this time.     Fluids: Per surgery  Pain meds: Per surgery  Therapy: Per surgery    Blue:Not present  Lines: None       Current Diet  Orders Placed This Encounter      Discharge Instruction - Regular Diet Adult      Advance Diet as Tolerated: Regular Diet Adult    Supplements  None    Barriers to Discharge: Hypersomnolence/fatigue    Disposition: Hopeful discharge 9/22 if alertness continues to improve    Clinically Significant Risk Factors Present on Admission               # Coagulation Defect: INR = 1.67 (Ref range: 0.85 - 1.15) and/or PTT = N/A, will monitor for bleeding  # Thrombocytopenia: Lowest platelets = 52 in last 2 days, will monitor for bleeding   # Hypertension: Noted on problem list      # Overweight: Estimated body mass index is 26.08 kg/m  as calculated from the following:    Height as of this encounter: 1.803 m (5' 11\").    Weight as of this encounter: 84.8 kg (187 lb).              Interval History/Subjective:  Patient more awake today than yesterday.  Does awaken to voice, answers questions appropriately and follows commands.  However still will fall asleep during the course of my visit.  Denies chest pain shortness of breath nausea or vomiting.  States his leg pain is under good control.  No family present.  Questions answered to verbalized satisfaction.      Last 24H PRN:     hydrALAZINE (APRESOLINE) injection 10 mg, 10 mg at 09/20/23 2215    lactulose (CHRONULAC) solution 10 g, 10 g at 09/20/23 1642    Physical Exam/Objective:  Temp:  [97.4  F (36.3  C)-98.2  F (36.8  C)] 98.2  F (36.8  C)  Pulse:  [75-81] 75  Resp:  [18] 18  BP: (136-185)/(65-84) 160/74  SpO2:  [93 %-97 %] 96 %  Wt Readings from Last 4 Encounters: "   09/19/23 84.8 kg (187 lb)   09/13/23 85 kg (187 lb 6.4 oz)   05/10/23 81 kg (178 lb 9.6 oz)   04/24/23 83.7 kg (184 lb 8 oz)     Body mass index is 26.08 kg/m .    Constitutional: fatigued, awakens to voice but will fall back asleep, cooperative, no apparent distress, appears stated age, and mildly obese  ENT: Normocephalic, without obvious abnormality, atraumatic, external ears without lesions, oral pharynx with moist mucous membranes, tonsils without erythema or exudates, gums normal and good dentition.  Respiratory: No increased work of breathing, good air exchange, clear to auscultation bilaterally, no crackles or wheezing  Cardiovascular: Normal apical impulse, regular rate and rhythm, normal S1 and S2, no S3 or S4, and no murmur noted  GI: No scars, normal bowel sounds, soft, non-distended, non-tender, no masses palpated, no hepatosplenomegally  Skin: normal skin color, texture, turgor, no redness, warmth, or swelling, and no rashes  Musculoskeletal: Stable goal surgical dressings to lower extremity otherwise no redness, warmth, or swelling of the joints.  Motor strength is 5 out of 5 all extremities bilaterally.  Tone is normal. no lower extremity pitting edema present  Neurologic: Cranial nerves II-XII are grossly intact. Sensory:  Sensory intact  Neuropsychiatric: General: normal, calm, and normal eye contact Level of consciousness: Fatigued/ normal Affect: normal Orientation: oriented to self, place, time and situation Memory and insight: normal, memory for past and recent events intact, and thought process normal      Medications:   Personally Reviewed.  Medications      acetaminophen  650 mg Oral TID    furosemide  20 mg Oral QPM    furosemide  40 mg Oral QAM    lactulose  20 g Oral TID    multivitamin, therapeutic  1 tablet Oral Daily    polyethylene glycol  17 g Oral Daily    potassium chloride ER  40 mEq Oral Daily    senna-docusate  1 tablet Oral BID    sertraline  25 mg Oral Daily    sodium  chloride (PF)  3 mL Intracatheter Q8H    vitamin D3  50 mcg Oral Daily    [Held by provider] Vitamin K2  200 mcg Oral Daily       Data reviewed today: I personally reviewed all new medications, labs, imaging/diagnostics reports over the past 24 hours. Pertinent findings include:    Imaging:   No results found for this or any previous visit (from the past 24 hour(s)).    Labs:  POC US Guidance Needle Placement   Final Result        Recent Results (from the past 24 hour(s))   Glucose    Collection Time: 09/21/23  6:09 AM   Result Value Ref Range    Glucose 106 (H) 70 - 99 mg/dL    Patient Fasting > 8hrs? Unknown        Pending Labs:  Unresulted Labs Ordered in the Past 30 Days of this Admission       No orders found from 8/20/2023 to 9/20/2023.              Tommy Foreman MD  [facility]  [facility]  Phone: #[phone]

## 2023-09-21 NOTE — PLAN OF CARE
Patient vital signs are at baseline: No,  Reason:  hypertensive,   Patient able to ambulate as they were prior to admission or with assist devices provided by therapies during their stay:  No,  Reason:  Intermittent confusion; unsteady gait. Requires assist of 2 and extensive redirecting  Patient MUST void prior to discharge:  Yes  Patient able to tolerate oral intake:  Yes  Pain has adequate pain control using Oral analgesics:  Yes  Does patient have an identified :  Yes  Has goal D/C date and time been discussed with patient:  Yes     Goal Outcome Evaluation:    A/O x 4, intermittent confusion. Appeared more lethargic and unsteady after dose of atarax for pain, 25 mg PRN. Decreased appetite; push fluid intake.      Problem: Plan of Care - These are the overarching goals to be used throughout the patient stay.    Goal: Optimal Comfort and Wellbeing  Outcome: Progressing  Intervention: Monitor Pain and Promote Comfort  Recent Flowsheet Documentation  Taken 9/21/2023 0117 by Any Hutchinson, RN  Pain Management Interventions:   medication (see MAR)   rest     Problem: Knee Arthroplasty  Goal: Acceptable Pain Control  Outcome: Progressing  Intervention: Prevent or Manage Pain  Recent Flowsheet Documentation  Taken 9/21/2023 0117 by Any Hutchinson, RN  Pain Management Interventions:   medication (see MAR)   rest  Complementary Therapy: music therapy provided

## 2023-09-21 NOTE — PLAN OF CARE
Note from [id number]. Pt denied pain during shift thus far. No new skin issues noted. Dressing is intact, dried drainage. Full sensation per pt. SBA to assist of 1 with walker for transferring, depends on pt's mentation. Saline locked. Voiding adequately. 1 large BM this shift. Education on medication administration and use of call-light to reduce risk for falls and injury. Pt is more alert today, however, still falls asleep mid sentence and while taking pills. Med changes per ortho and [facility]. Ortho PA and [facility] aware of pt's level of consciousness.  No further issues noted. Alarms in place. VSS, denies shortness of breath.    Patient vital signs are at baseline: Yes  Patient able to ambulate as they were prior to admission or with assist devices provided by therapies during their stay:  No, Reason: Working with PT and OT.  Patient MUST void prior to discharge:  Yes  Patient able to tolerate oral intake:  Yes  Pain has adequate pain control using Oral analgesics:  Yes  Does patient have an identified :  Yes  Has goal D/C date and time been discussed with patient:  Yes    Taylor R Schoenecker, RN

## 2023-09-21 NOTE — UTILIZATION REVIEW
Admission Status; Secondary Review Determination   Under the authority of the Utilization Management Committee, the utilization review process indicated a secondary review on Ivan Bell. The review outcome is based on review of the medical records, discussions with staff, and applying clinical experience noted on the date of the review.   (x) Outpatient Status with extended recovery is appropriate - This patient does not meet hospital inpatient criteria.     RATIONALE FOR DETERMINATION   58-year-old male status post left total knee arthroplasty with history of alcoholic cirrhosis, chronic kidney disease and depression.  Remains in the hospital secondary to somnolence likely from his pain medication and possible hyper ammonemia but this has not been checked.  Medications being adjusted.  Patient was admitted to the hospital after the procedure. Patient has Medicare and the procedure is not on the CMS inpatient list. No documented complications or unexpected recovery. Patient can be safely  monitored and recover in outpatient/extended recovery setting.  He has been ambulating, tolerating oral intake and voiding and his pain is controlled with oral medication.  The severity of illness, intensity of service provided, expected LOS and risk for adverse outcome doesn't meet inpatient hospital admission.   The information on this document is developed by the utilization review team in order for the business office to ensure compliance. This only denotes the appropriateness of proper admission status and does not reflect the quality of care rendered.   The definitions of Inpatient Status and Observation Status used in making the determination above are those provided in the CMS Coverage Manual, Chapter 1 and Chapter 6, section 70.4.   Sincerely,   Cody Boyd MD  Utilization Review  Physician Advisor  [facility]

## 2023-09-22 ENCOUNTER — APPOINTMENT (OUTPATIENT)
Dept: OCCUPATIONAL THERAPY | Facility: CLINIC | Age: 58
End: 2023-09-22
Attending: ORTHOPAEDIC SURGERY
Payer: COMMERCIAL

## 2023-09-22 ENCOUNTER — APPOINTMENT (OUTPATIENT)
Dept: PHYSICAL THERAPY | Facility: CLINIC | Age: 58
End: 2023-09-22
Attending: ORTHOPAEDIC SURGERY
Payer: COMMERCIAL

## 2023-09-22 VITALS
DIASTOLIC BLOOD PRESSURE: 72 MMHG | WEIGHT: 187 LBS | OXYGEN SATURATION: 95 % | RESPIRATION RATE: 18 BRPM | BODY MASS INDEX: 26.18 KG/M2 | HEIGHT: 71 IN | SYSTOLIC BLOOD PRESSURE: 162 MMHG | TEMPERATURE: 97.6 F | HEART RATE: 72 BPM

## 2023-09-22 PROCEDURE — 97110 THERAPEUTIC EXERCISES: CPT | Mod: GP

## 2023-09-22 PROCEDURE — 99214 OFFICE O/P EST MOD 30 MIN: CPT | Performed by: FAMILY MEDICINE

## 2023-09-22 PROCEDURE — 97116 GAIT TRAINING THERAPY: CPT | Mod: GP

## 2023-09-22 PROCEDURE — 97535 SELF CARE MNGMENT TRAINING: CPT | Mod: GO

## 2023-09-22 PROCEDURE — 250N000013 HC RX MED GY IP 250 OP 250 PS 637: Performed by: FAMILY MEDICINE

## 2023-09-22 RX ORDER — LACTULOSE 10 G/15ML
20 SOLUTION ORAL 3 TIMES DAILY
Qty: 2700 ML | Refills: 0 | Status: SHIPPED | OUTPATIENT
Start: 2023-09-22

## 2023-09-22 RX ORDER — HYDROXYZINE HYDROCHLORIDE 10 MG/1
10 TABLET, FILM COATED ORAL EVERY 6 HOURS PRN
Qty: 30 TABLET | Refills: 0 | Status: SHIPPED | OUTPATIENT
Start: 2023-09-22 | End: 2024-04-03

## 2023-09-22 RX ORDER — HYDROCODONE BITARTRATE AND ACETAMINOPHEN 5; 325 MG/1; MG/1
1 TABLET ORAL EVERY 6 HOURS PRN
Qty: 20 TABLET | Refills: 0 | Status: SHIPPED | OUTPATIENT
Start: 2023-09-22 | End: 2024-04-03

## 2023-09-22 RX ADMIN — Medication 50 MCG: at 08:24

## 2023-09-22 RX ADMIN — SERTRALINE HYDROCHLORIDE 25 MG: 25 TABLET, FILM COATED ORAL at 08:24

## 2023-09-22 RX ADMIN — LACTULOSE 20 G: 10 SOLUTION ORAL at 08:24

## 2023-09-22 RX ADMIN — FUROSEMIDE 40 MG: 40 TABLET ORAL at 08:24

## 2023-09-22 RX ADMIN — POTASSIUM CHLORIDE 40 MEQ: 1500 TABLET, EXTENDED RELEASE ORAL at 08:24

## 2023-09-22 RX ADMIN — ACETAMINOPHEN 650 MG: 325 TABLET ORAL at 08:24

## 2023-09-22 RX ADMIN — THERA TABS 1 TABLET: TAB at 08:24

## 2023-09-22 ASSESSMENT — ACTIVITIES OF DAILY LIVING (ADL)
ADLS_ACUITY_SCORE: 27
ADLS_ACUITY_SCORE: 31
ADLS_ACUITY_SCORE: 27
ADLS_ACUITY_SCORE: 32
ADLS_ACUITY_SCORE: 27
ADLS_ACUITY_SCORE: 27
ADLS_ACUITY_SCORE: 31

## 2023-09-22 NOTE — PROGRESS NOTES
Physical Therapy Discharge Summary    Reason for therapy discharge:    All goals and outcomes met, no further needs identified.    Progress towards therapy goal(s). See goals on Care Plan in [facility] electronic health record for goal details.  Goals met    Therapy recommendation(s):    Continue home exercise program.

## 2023-09-22 NOTE — PROGRESS NOTES
[facility] MEDICINE  PROGRESS NOTE     Code Status: Full Code  Procedure(s):  LEFT TOTAL KNEE ARTHROPLASTY  3 Days Post-Op  Identification/Summary:   Ivan Bell is a 58 year old male with a PMH of alcoholic cirrhosis sober since 2015, HTN, CKD 2, thrombocytopenia, hypertriglyceridemia, depression, gout.  Underwent left total knee arthroplasty.  Postoperatively very somnolent after receiving his pain medications.  Changed lactulose to scheduled and increased dose.  Decreased pain medications and given scheduled Tylenol.  Alertness had slow gradual improvement.  Patient back to baseline and cleared for discharge on 9/22.  Discussed with importance for future surgeries to minimize narcotics and to be taking his lactulose scheduled prior to the surgery.  Follow-up with primary in 2 to 3 weeks.     Assessment and Plan:     Status post left total knee arthroplasty  Postoperative management per orthopedics.  Somnolence  Likely etiology from both his pain medications and potential hyperammonemia.  Decreased his Dilaudid and Atarax  Continue Tylenol scheduled.  Changed lactulose to 10 g scheduled 3 times daily.  Later increased to 20 g 3 times daily and this did result in patient having 5 bowel movements.    Subsequently had good improvement to his alertness and is back to baseline on 9/22.  Medically cleared for discharge.  Acute on chronic blood loss anemia  Preoperative hemoglobin 10.9 then down to 10.5 and recheck at 10.  No indication for transfusion at this time.  Alcoholic cirrhosis with ascites  History of TIPS procedure  Sober from alcohol since 2015.  States has been taking his medications regularly.  Continue home Lasix 40 mg a.m. 20 mg p.m.    Continue potassium 40 mill equivalents daily.  As above adjusted his lactulose from as needed to scheduled dosing and increased strength.   Goal is 3-4 bowel movements per day.  Discussed importance of taking his lactulose regularly  "especially before any future surgeries.  Depression  Continue Zoloft.  Thrombocytopenia  Preoperative platelets 55.  Postoperatively platelets are stable.  Chronic kidney disease stage II  Recheck BMP shows stable kidney function.  Creatinine 1.34.  Hyperglycemia  A.m. after surgery glucose was up to 174.  A1c was down to 4.8.  Recommend annual fasting glucose testing.     Anticoagulation.  Elevated INR  Preop INR up to 1.7.  Anticoagulation per surgery.  SCDs and ambulation.  Hold his vitamin K at this time.     Fluids: Per surgery  Pain meds: Per surgery  Therapy: Per surgery    Blue:Not present  Lines: None       Current Diet  Orders Placed This Encounter      Discharge Instruction - Regular Diet Adult      Advance Diet as Tolerated: Regular Diet Adult    Supplements  None    Barriers to Discharge: Medically stable for discharge by orthopedics    Disposition: Discharge med rec reviewed and our area of responsibility was addressed.    Clinically Significant Risk Factors Present on Admission               # Coagulation Defect: INR = 1.67 (Ref range: 0.85 - 1.15) and/or PTT = N/A, will monitor for bleeding    # Hypertension: Noted on problem list      # Overweight: Estimated body mass index is 26.08 kg/m  as calculated from the following:    Height as of this encounter: 1.803 m (5' 11\").    Weight as of this encounter: 84.8 kg (187 lb).              Interval History/Subjective:  Patient doing well.  Feels back to normal.  No fatigue exhibited.  Able to participate with therapy.  No chest pain.  No shortness of breath.  No nausea or vomiting.  Has had 5 bowel movements in the last 24 hours.  Questions answered to verbalized satisfaction.        Physical Exam/Objective:  Temp:  [97.6  F (36.4  C)-98.4  F (36.9  C)] 97.6  F (36.4  C)  Pulse:  [72-91] 72  Resp:  [16-18] 18  BP: (162-177)/(72-81) 162/72  SpO2:  [91 %-96 %] 95 %  Wt Readings from Last 4 Encounters:   09/19/23 84.8 kg (187 lb)   09/13/23 85 kg (187 lb 6.4 " oz)   05/10/23 81 kg (178 lb 9.6 oz)   04/24/23 83.7 kg (184 lb 8 oz)     Body mass index is 26.08 kg/m .    Constitutional: awake, alert, cooperative, no apparent distress, and appears stated age  ENT: Normocephalic, without obvious abnormality, atraumatic, external ears without lesions, oral pharynx with moist mucous membranes, tonsils without erythema or exudates, gums normal and good dentition.  Respiratory: No increased work of breathing, good air exchange, clear to auscultation bilaterally, no crackles or wheezing  Cardiovascular: Normal apical impulse, regular rate and rhythm, normal S1 and S2, no S3 or S4, and no murmur noted  GI: No scars, normal bowel sounds, soft, non-distended, non-tender, no masses palpated, no hepatosplenomegally  Skin: normal skin color, texture, turgor, no redness, warmth, or swelling, and no rashes  Musculoskeletal: There is no redness, warmth, or swelling of the joints.  Motor strength is 5 out of 5 all extremities bilaterally.  Tone is normal. no lower extremity pitting edema present  Neurologic: Cranial nerves II-XII are grossly intact. Sensory:  Sensory intact  Neuropsychiatric: General: normal, calm, and normal eye contact Level of consciousness: alert / normal Affect: normal Orientation: oriented to self, place, time and situation Memory and insight: normal, memory for past and recent events intact, and thought process normal      Medications:   Personally Reviewed.  Medications      acetaminophen  650 mg Oral TID    furosemide  20 mg Oral QPM    furosemide  40 mg Oral QAM    lactulose  20 g Oral TID    multivitamin, therapeutic  1 tablet Oral Daily    polyethylene glycol  17 g Oral Daily    potassium chloride ER  40 mEq Oral Daily    senna-docusate  1 tablet Oral BID    sertraline  25 mg Oral Daily    sodium chloride (PF)  3 mL Intracatheter Q8H    vitamin D3  50 mcg Oral Daily    [Held by provider] Vitamin K2  200 mcg Oral Daily       Data reviewed today: I personally  reviewed all new medications, labs, imaging/diagnostics reports over the past 24 hours. Pertinent findings include:    Imaging:   No results found for this or any previous visit (from the past 24 hour(s)).    Labs:  POC US Guidance Needle Placement   Final Result        No results found for this or any previous visit (from the past 24 hour(s)).    Pending Labs:  Unresulted Labs Ordered in the Past 30 Days of this Admission       No orders found from 8/20/2023 to 9/20/2023.              Tommy Foreman MD  [facility]  Phone: #[phone]

## 2023-09-22 NOTE — PROGRESS NOTES
"Orthopedic Progress Note      Assessment: 3 Day Post-Op  S/P Procedure(s):  LEFT TOTAL KNEE ARTHROPLASTY     Plan:   - Continue PT/OT.   - Weightbearing status: WBAT.  - Anticoagulation: none in addition to SCDs, denilson stockings and early ambulation.  - Discharge planning: home today     Subjective:  Pain: Well controlled on Tylenol & oxycodone.  Nausea, Vomiting:  No  Chest pain: No  Lightheadedness, Dizziness:  No  Neuro:  Patient denies new onset numbness or paresthesias in left lower extremity     Patient is doing well on POD #2. Ambulating, tolerating oral intake, voiding & pain is controlled with oral medication.  Hgb 10 (pre-op 10.5).  patient is more awake today and cleared medically. Has worked with therapy and passed. Meds are helping his pain     Objective:  BP (!) 162/72 (BP Location: Left arm)   Pulse 72   Temp 97.6  F (36.4  C) (Oral)   Resp 18   Ht 1.803 m (5' 11\")   Wt 84.8 kg (187 lb)   SpO2 95%   BMI 26.08 kg/m    The patient is A&Ox3. Appears comfortable, sitting up at bedside.  Sensation is intact to light touch & equal bilaterally in the L2 through S1 dermatomes.  Calves are soft and non-tender.  Dorsiflexion and plantar flexion is intact bilaterally.  Appropriate flexion and extension of the toes bilaterally.   Brisk capillary refill in the toes bilaterally.   Palpable left dorsalis pedis pulse.  left knee dressing C/D/I.      Pertinent Labs   Lab Results: personally reviewed.   Lab Results   Component Value Date    INR 1.67 (H) 09/20/2023    INR 1.70 (H) 09/19/2023    INR 1.65 (H) 09/13/2023     Lab Results   Component Value Date    WBC 5.3 09/20/2023    HGB 10.0 (L) 09/20/2023    HCT 30.2 (L) 09/20/2023    MCV 95 09/20/2023    PLT 52 (L) 09/20/2023     Lab Results   Component Value Date     09/20/2023    CO2 24 09/20/2023         Report completed by:  Josefina Jacob PA-C/Dr. [facility]  09/22/23      "

## 2023-09-22 NOTE — PLAN OF CARE
Patient vital signs are at baseline: Yes  Patient able to ambulate as they were prior to admission or with assist devices provided by therapies during their stay:  Yes  Patient MUST void prior to discharge:  Yes  Patient able to tolerate oral intake:  Yes  Pain has adequate pain control using Oral analgesics:  Yes  Does patient have an identified :  Yes  Has goal D/C date and time been discussed with patient:  Yes     Pt is A/O x3, disoriented to time. Has trouble following directions at times. BPs running high, other vitals on RA. A1 with walker and gait belt when ambulating. Voiding adequately. Dressing is CDI. CMS intact. IV saline locked, patent.

## 2023-09-22 NOTE — DISCHARGE SUMMARY
"ORTHOPEDIC DISCHARGE SUMMARY       Ivan Bell,  1965, MRN [id number]    Admission Date: 2023      Admission Diagnoses: Osteoarthritis of left knee [M17.12]  Knee osteoarthritis [M17.9]     Discharge Date:  23     Post-operative Day:  3 Days Post-Op    Reason for Admission: The patient was admitted for the following: Procedure(s):  LEFT TOTAL KNEE ARTHROPLASTY    BRIEF HOSPITAL COURSE   Ivan Bell is a pleasant 58 year old male who underwent the aforementioned procedure with Dr. Landaverde on . There were no intraoperative complications and the patient was transferred to the recovery room and later the orthopedic unit in stable condition. Once the patient reached the orthopedic floor our orthopedic pain protocol was implemented along with the following:    Anticoagulation Medications: None  Therapy: PT and OT  Activity: WBAT  Bracing: None    Consultations during Admission: Hospitalist service for medical management     COMPLICATIONS/SIGNIFICANT FINDINGS    NONE    DISCHARGE INFORMATION   Condition at discharge: Good  Discharge destination: Home  Patient was seen by myself on the date of discharge.    FOLLOW UP CARE   Follow up with orthopedics in 2 weeks or sooner should the need arise. Ortho will continue to manage pain control, post op anticoagulation and incision care.     Follow up with your PCP for management of chronic medical problems and to evaluate for post op medical complications including constipation, nausea/vomiting, DVT/PE, anemia, changes in blood pressure, fevers/chills, urinary retention and atelectasis/pneumonia.     Subjective   Patient is doing well on POD #1. Pain is well controlled with oral medications. Ambulating. Tolerating oral intake.     Physical Exam   BP (!) 162/72 (BP Location: Left arm)   Pulse 72   Temp 97.6  F (36.4  C) (Oral)   Resp 18   Ht 1.803 m (5' 11\")   Wt 84.8 kg (187 lb)   SpO2 95%   BMI 26.08 kg/m    The patient is A&Ox3. Appears " comfortable, sitting up at bedside.  Sensation is intact to light touch & equal bilaterally in the L2 through S1 dermatomes.  Calves are soft and non-tender.  Dorsiflexion and plantar flexion is intact bilaterally.  Appropriate flexion and extension of the toes bilaterally.   Brisk capillary refill in the toes bilaterally.   Palpable left dorsalis pedis pulse.  left knee dressing C/D/I.      Pertinent Results at Discharge     Hemoglobin   Date/Time Value Ref Range Status   09/20/2023 04:50 AM 10.0 (L) 13.3 - 17.7 g/dL Final   09/19/2023 07:13 PM 10.5 (L) 13.3 - 17.7 g/dL Final   09/19/2023 09:50 AM 10.9 (L) 13.3 - 17.7 g/dL Final   06/23/2021 06:05 PM 12.1 (L) 13.3 - 17.7 g/dL Final   03/26/2021 02:22 PM 11.0 (L) 13.3 - 17.7 g/dL Final   02/11/2021 07:57 AM 11.8 (L) 13.3 - 17.7 g/dL Final     INR   Date/Time Value Ref Range Status   09/20/2023 04:50 AM 1.67 (H) 0.85 - 1.15 Final   09/19/2023 09:50 AM 1.70 (H) 0.85 - 1.15 Final   09/13/2023 11:24 AM 1.65 (H) 0.85 - 1.15 Final   06/23/2021 06:05 PM 1.79 (H) 0.86 - 1.14 Final   02/11/2021 07:57 AM 1.48 (H) 0.86 - 1.14 Final   11/02/2020 08:50 AM 1.77 (H) 0.86 - 1.14 Final     Platelet Count   Date/Time Value Ref Range Status   09/20/2023 04:50 AM 52 (L) 150 - 450 10e3/uL Final   09/19/2023 07:13 PM 58 (L) 150 - 450 10e3/uL Final   09/19/2023 09:50 AM 55 (L) 150 - 450 10e3/uL Final   06/23/2021 06:05 PM 58 (L) 150 - 450 10e9/L Final   03/26/2021 02:22 PM 53 (L) 150 - 450 10e9/L Final     Comment:     Results confirmed by repeat test   02/11/2021 07:57 AM 52 (L) 150 - 450 10e9/L Final       Problem List   Principal Problem:    Knee osteoarthritis  Active Problems:    Alcoholic cirrhosis of liver with ascites (H)    Thrombocytopenia (H)    Hypersomnolence      Josefina Jacob PA-C/Dr. Landaverde  [facility]  [phone]  Date: 9/22/2023  Time: 10:58 AM

## 2023-09-22 NOTE — PLAN OF CARE
Orthopedic Progress Note  Pt has refused assessment and vitals for the beginning of shift, writer re-approached multiple times throughout the evening. Unable to assess until this evening when pt woke up and was accepting. At 2200 this evening he was up in room, oriented x3, assist of one to use urinal at bedside. Pleasant and agreeable to cares.    Patient vital signs are at baseline, except hypertensive. Ambulating with assist of one, use of walker and gait belt. Alarms in place for safety. Pt is voiding.Regular diet, tolerating well, denies nausea. Bowel sounds present.     Pt states pain is 2/10 to left knee, scheduled tylenol given. CMS intact to extremity, ACE wrap dressing changed due to some old dried drainage. Will continue to monitor.     Possible discharge tomorrow, pending mentation, safe discharge environment.      Fox Lee, MARIANAN, RN

## 2023-09-28 ENCOUNTER — PATIENT OUTREACH (OUTPATIENT)
Dept: GASTROENTEROLOGY | Facility: CLINIC | Age: 58
End: 2023-09-28
Payer: COMMERCIAL

## 2023-09-28 DIAGNOSIS — K70.31 ALCOHOLIC CIRRHOSIS OF LIVER WITH ASCITES (H): Primary | ICD-10-CM

## 2023-09-28 NOTE — PROGRESS NOTES
Patient is s/p left total knee arthroplasty on 9/19/23. He is at home now recovering, and attending physical therapy. He states the only issue he has had post op was about 3 days of somnolence following anesthesia. He states this is much improved now, but he had not experienced this before. He denies issues with ascites, edema, hepatic encephalopathy, and only concern is leg pain and the healing process.   Reviewed upcoming appointments next week including labs, ultrasound, and office visit with Dr. Bales on 10/3.

## 2023-10-03 ENCOUNTER — OFFICE VISIT (OUTPATIENT)
Dept: GASTROENTEROLOGY | Facility: CLINIC | Age: 58
End: 2023-10-03
Attending: INTERNAL MEDICINE
Payer: COMMERCIAL

## 2023-10-03 ENCOUNTER — LAB (OUTPATIENT)
Dept: LAB | Facility: CLINIC | Age: 58
End: 2023-10-03
Payer: COMMERCIAL

## 2023-10-03 ENCOUNTER — ANCILLARY PROCEDURE (OUTPATIENT)
Dept: ULTRASOUND IMAGING | Facility: CLINIC | Age: 58
End: 2023-10-03
Attending: INTERNAL MEDICINE
Payer: COMMERCIAL

## 2023-10-03 VITALS
BODY MASS INDEX: 25.01 KG/M2 | TEMPERATURE: 98 F | HEART RATE: 74 BPM | SYSTOLIC BLOOD PRESSURE: 147 MMHG | DIASTOLIC BLOOD PRESSURE: 75 MMHG | WEIGHT: 179.3 LBS | OXYGEN SATURATION: 99 %

## 2023-10-03 DIAGNOSIS — Z95.828 S/P TIPS (TRANSJUGULAR INTRAHEPATIC PORTOSYSTEMIC SHUNT): ICD-10-CM

## 2023-10-03 DIAGNOSIS — K70.31 ALCOHOLIC CIRRHOSIS OF LIVER WITH ASCITES (H): ICD-10-CM

## 2023-10-03 DIAGNOSIS — K70.30 ALCOHOLIC CIRRHOSIS OF LIVER WITHOUT ASCITES (H): ICD-10-CM

## 2023-10-03 DIAGNOSIS — K70.30 ALCOHOLIC CIRRHOSIS OF LIVER WITHOUT ASCITES (H): Primary | ICD-10-CM

## 2023-10-03 LAB
ALBUMIN SERPL BCG-MCNC: 3 G/DL (ref 3.5–5.2)
ALP SERPL-CCNC: 151 U/L (ref 40–129)
ALT SERPL W P-5'-P-CCNC: <5 U/L (ref 0–70)
ANION GAP SERPL CALCULATED.3IONS-SCNC: 7 MMOL/L (ref 7–15)
AST SERPL W P-5'-P-CCNC: 27 U/L (ref 0–45)
BILIRUB DIRECT SERPL-MCNC: 1.09 MG/DL (ref 0–0.3)
BILIRUB SERPL-MCNC: 2.5 MG/DL
BUN SERPL-MCNC: 11.4 MG/DL (ref 8–23)
CALCIUM SERPL-MCNC: 8.8 MG/DL (ref 8.8–10.2)
CHLORIDE SERPL-SCNC: 111 MMOL/L (ref 98–107)
CREAT SERPL-MCNC: 1.16 MG/DL (ref 0.67–1.17)
DEPRECATED HCO3 PLAS-SCNC: 24 MMOL/L (ref 22–29)
EGFRCR SERPLBLD CKD-EPI 2021: 72 ML/MIN/1.73M2
ERYTHROCYTE [DISTWIDTH] IN BLOOD BY AUTOMATED COUNT: 16.9 % (ref 10–15)
GLUCOSE SERPL-MCNC: 97 MG/DL (ref 70–99)
HCT VFR BLD AUTO: 29.8 % (ref 40–53)
HGB BLD-MCNC: 10.1 G/DL (ref 13.3–17.7)
INR PPP: 1.64 (ref 0.85–1.15)
MCH RBC QN AUTO: 32 PG (ref 26.5–33)
MCHC RBC AUTO-ENTMCNC: 33.9 G/DL (ref 31.5–36.5)
MCV RBC AUTO: 94 FL (ref 78–100)
PLATELET # BLD AUTO: 95 10E3/UL (ref 150–450)
POTASSIUM SERPL-SCNC: 3.4 MMOL/L (ref 3.4–5.3)
PROT SERPL-MCNC: 5.5 G/DL (ref 6.4–8.3)
RBC # BLD AUTO: 3.16 10E6/UL (ref 4.4–5.9)
SODIUM SERPL-SCNC: 142 MMOL/L (ref 135–145)
WBC # BLD AUTO: 4 10E3/UL (ref 4–11)

## 2023-10-03 PROCEDURE — 93975 VASCULAR STUDY: CPT | Mod: GC | Performed by: RADIOLOGY

## 2023-10-03 PROCEDURE — 99213 OFFICE O/P EST LOW 20 MIN: CPT | Performed by: INTERNAL MEDICINE

## 2023-10-03 PROCEDURE — 99214 OFFICE O/P EST MOD 30 MIN: CPT | Performed by: INTERNAL MEDICINE

## 2023-10-03 PROCEDURE — 36415 COLL VENOUS BLD VENIPUNCTURE: CPT | Performed by: PATHOLOGY

## 2023-10-03 PROCEDURE — 85027 COMPLETE CBC AUTOMATED: CPT | Performed by: PATHOLOGY

## 2023-10-03 PROCEDURE — 76700 US EXAM ABDOM COMPLETE: CPT | Mod: GC | Performed by: RADIOLOGY

## 2023-10-03 PROCEDURE — 82248 BILIRUBIN DIRECT: CPT | Performed by: PATHOLOGY

## 2023-10-03 PROCEDURE — 80053 COMPREHEN METABOLIC PANEL: CPT | Performed by: PATHOLOGY

## 2023-10-03 PROCEDURE — 85610 PROTHROMBIN TIME: CPT | Performed by: PATHOLOGY

## 2023-10-03 NOTE — LETTER
10/3/2023         RE: Ivan Bell  [address] [facility] [address]        Dear Colleague,    Thank you for referring your patient, Ivan Bell, to the [facility] [place]. Please see a copy of my visit note below.    [facility]    Follow-up Visit    Follow-up visit for cirrhosis    Subjective:  58 year old male    Cirrhosis  - ETOH  - hx ascites, TIPS placement 5/14/18, 6/6/18; TIPS revision 10/29/19, 11/25/2020, 8/11/2021, 1/28/2022  - hx HE  - hx variceal bleed Oct 2017  - ETOH hepatitis March 2019  - last EGD Jun 2019- diminutive EV, mild portal hypertensive gastropathy  - HCC screening- liver TIPS doppler U/S 10/3/2023     Last visit April 2023.  Patient underwent cystoscopy with lithotripsy in April 2023 and again in May 2023.  Patient underwent left knee TKA 2 weeks ago.  No new regular prescription medications or ER visits since last visit.    Patient is well today.  His main issues are related to postoperative knee pain.  He denies any symptoms related to liver disease.    The patient was intubated longer than expected at the time of the surgery.  He did not develop any issues with fluid retention or hepatic encephalopathy while in hospital.    Patient denies jaundice, lower extremity edema, abdominal distension, lethargy or confusion.    Patient denies melena, hematemesis or hematochezia.    Patient denies fevers, sweats or chills.      Weight stable.  Appetite is normal.    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  He is not currently working due to recovery from surgery.  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    Medical hx Surgical hx   Past Medical History:   Diagnosis Date    Alcoholic cirrhosis of liver (H)     Alcoholic hepatitis (H28) 03/2019    Chronic kidney disease     CRF    Depression     Gout     History of transfusion     Hypertension     Hypertriglyceridemia     Left  calcaneus fracture 01/09/2006 January 16, 2006: Fell 10 feet from ladder onto left foot on frozen ground on 1/9/06 at home.  Immediate pain and unable to walk- seen at [place] and diagnosed with calcaneus fracture    Nephrolithiasis     Osteoarthritis     Portal vein thrombosis     left occlusion, partial main    Thrombocytopenia (H24)       Past Surgical History:   Procedure Laterality Date    ANKLE SURGERY Left     ANKLE SURGERY      ARTHROPLASTY KNEE Left 9/19/2023    Procedure: LEFT TOTAL KNEE ARTHROPLASTY;  Surgeon: José Miguel Landaverde MD;  Location: [facility] OR    ARTHROSCOPY KNEE      COLONOSCOPY N/A 03/31/2016    Procedure: COLONOSCOPY;  Surgeon: Rhys Uriostegui MD;  Location: [facility] GI    COMBINED CYSTOSCOPY, RETROGRADES, URETEROSCOPY, LASER HOLMIUM LITHOTRIPSY URETER(S), INSERT STENT Bilateral 7/1/2022    Procedure: CYSTOSCOPY, RIGHT RETROGRADE PYELOGRAM, RIGHT URETEROSCOPY WITH LASER LITHOTRIPSY AND BASKET REMOVAL OF STONE, RIGHT URETERAL STENT PLACEMENT, LEFT RETROGRADE PYELOGRAM, LEFT URETEROSCOPY WITH LASER LITHOTRIPSY AND BASKET REMOVAL OF STONE, LEFT URETERAL STENT PLACEMENT;  Surgeon: Dylan Galaviz MD;  Location:  OR    COMBINED CYSTOSCOPY, RETROGRADES, URETEROSCOPY, LASER HOLMIUM LITHOTRIPSY URETER(S), INSERT STENT Bilateral 7/27/2022    Procedure: CYSTOSCOPY, BILATERAL URETERAL STENT REMOVAL, BILATERAL  RETROGRADE PYELOGRAM, BILATERAL URETEROSCOPY. HOLMIUM LASER LITHOTRIPSY LEFT SIDE.  AND BASKET REMOVAL OF STONES BILATERAL, LEFT  URETERAL STENT PLACEMENT;  Surgeon: Dylan Galaviz MD;  Location: [facility] OR    COMBINED CYSTOSCOPY, RETROGRADES, URETEROSCOPY, LASER HOLMIUM LITHOTRIPSY URETER(S), INSERT STENT Left 4/24/2023    Procedure: CYSTOSCOPY, LEFT RETROGRADE PYELOGRAM, LEFT URETEROSCOPY WITH LASER LITHOTRIOPSY AND BASKET REMOVAL OF STONES, LEFT URETERAL STENT PLACEMENT;  Surgeon: Dylan Galaviz MD;  Location: [facility] OR    COMBINED CYSTOSCOPY, RETROGRADES, URETEROSCOPY, LASER  HOLMIUM LITHOTRIPSY URETER(S), INSERT STENT Left 5/10/2023    Procedure: CYSTOSCOPY, LEFT URETERAL STENT REMOVAL, LEFT RETROGRADE PYELOGRAM, LEFT URETEROSCOPY WITH LASER LITHOTRIOPSY AND BASKET REMOVAL OF STONES, LEFT URETERAL STENT PLACEMENT;  Surgeon: Dylan Galaviz MD;  Location:  OR    ESOPHAGOSCOPY, GASTROSCOPY, DUODENOSCOPY (EGD), COMBINED N/A 03/31/2016    Procedure: COMBINED ESOPHAGOSCOPY, GASTROSCOPY, DUODENOSCOPY (EGD);  Surgeon: Rhys Uriostegui MD;  Location:  GI    ESOPHAGOSCOPY, GASTROSCOPY, DUODENOSCOPY (EGD), COMBINED N/A 03/09/2018    Procedure: COMBINED ESOPHAGOSCOPY, GASTROSCOPY, DUODENOSCOPY (EGD), BIOPSY SINGLE OR MULTIPLE;  EGD;  Surgeon: Gonzalo Wahl MD;  Location:  GI    ESOPHAGOSCOPY, GASTROSCOPY, DUODENOSCOPY (EGD), COMBINED N/A 06/07/2019    Procedure: ESOPHAGOGASTRODUODENOSCOPY (EGD);  Surgeon: Gonzalo Wahl MD;  Location:  GI    FOOT SURGERY      IR PARACENTESIS  10/29/2019    IR PARACENTESIS  11/25/2020    IR PARACENTESIS  08/11/2021    IR PARACENTESIS  01/28/2022    IR PARACENTESIS  03/30/2022    IR TRANSVEN INTRAHEPATIC PORTOSYST REV  10/29/2019    IR TRANSVEN INTRAHEPATIC PORTOSYST REV  11/25/2020    IR TRANSVEN INTRAHEPATIC PORTOSYST REV  08/11/2021    IR TRANSVEN INTRAHEPATIC PORTOSYST REV  01/28/2022    IR TRANSVEN INTRAHEPATIC PORTOSYST REV  03/30/2022    KNEE SURGERY Left     KNEE SURGERY Right     RELEASE CARPAL TUNNEL      RELEASE TRIGGER FINGER Right 06/11/2020    Procedure: RELEASE, TRIGGER FINGER, right ring and long finger;  Surgeon: Pascual Valencia MD;  Location: [facility] OR    SIGMOIDOSCOPY FLEXIBLE N/A 10/31/2017    Procedure: SIGMOIDOSCOPY FLEXIBLE;;  Surgeon: Armaan Adams MD;  Location:  GI    TIPS Procedure  06/06/2018    TIPS PROCEDURE  11/01/2020    ZZC PLASTY KNEE,MED OR LAT COMPARTMT Right 02/19/2021    Procedure: RIGHT UNICOMPARTMENTAL ARTHROPLASTY KNEE MEDIAL;  Surgeon: José Miguel Landaverde MD;  Location: [facility]  Main OR;  Service: Orthopedics          Medications  Prior to Admission medications    Medication Sig Start Date End Date Taking? Authorizing Provider   acetaminophen (TYLENOL) 500 MG tablet Take 1,000 mg by mouth every 6 hours as needed for mild pain    Yes Reported, Patient   Ascorbic Acid (VITAMIN C PO) Take 500 mg by mouth daily   Yes Reported, Patient   docusate sodium (COLACE) 100 MG tablet Take 1 tablet (100 mg) by mouth daily 5/10/23  Yes Dylan Galaviz MD   furosemide (LASIX) 20 MG tablet Take 1 tablet (20 mg) by mouth every evening 10/3/22  Yes Gonzalo Wahl MD   furosemide (LASIX) 40 MG tablet Take 1 tablet (40 mg) by mouth every morning 10/3/22  Yes Gonazlo Wahl MD   HYDROcodone-acetaminophen (NORCO) 5-325 MG tablet Take 1 tablet by mouth every 6 hours as needed for severe pain or moderate pain 9/22/23  Yes Josefina Jacob PA-C   hydrOXYzine (ATARAX) 10 MG tablet Take 1 tablet (10 mg) by mouth every 6 hours as needed for other (adjuvant pain) 9/22/23  Yes Josefina Jacob PA-C   lactulose (CHRONULAC) 10 GM/15ML solution Take 30 mLs (20 g) by mouth 3 times daily TAKE TO MAINTAIN 3 TO 4 BOWEL MOVEMENTS DAILY 9/22/23  Yes Tommy Foreman MD   Menaquinone-7 (VITAMIN K2) 100 MCG CAPS Take 200 mcg by mouth daily    Yes Reported, Patient   MULTIPLE VITAMINS PO Take 1 tablet by mouth daily   Yes Reported, Patient   potassium chloride ER (KLOR-CON M) 20 MEQ CR tablet Take 2 tablets (40 mEq) by mouth daily 10/14/22  Yes Celestino Verduzco PA-C   senna-docusate (SENOKOT-S/PERICOLACE) 8.6-50 MG tablet Take 1-2 tablets by mouth 2 times daily Take while on oral narcotics to prevent or treat constipation. 9/19/23  Yes José Miguel Landaverde MD   sertraline (ZOLOFT) 25 MG tablet Take 1 tablet (25 mg) by mouth daily 4/3/23  Yes Gonzalo Wahl MD   sildenafil (REVATIO) 20 MG tablet Take 3-5 tabs 1/2-4 hours to relations 6/7/22  Yes Abena Acuña MD   vitamin D3  (CHOLECALCIFEROL) 2000 units (50 mcg) tablet Take 1 tablet by mouth daily   Yes Unknown, Entered By History       Allergies  Allergies   Allergen Reactions    Trazodone Visual Disturbance    Oxycodone Other (See Comments)     Delirium and constipation  Delirium and constipation       Review of systems  A 10-point review of systems was negative    Examination  Wt 81.3 kg (179 lb 4.8 oz)   BMI 25.01 kg/m    Body mass index is 25.01 kg/m .    Gen- well, NAD, A+Ox3, normal color  CVS- RRR  RS- CTA  Abd- SNT, no ascites or organomegaly on palpation or percussion, BS+  Extr- hands normal, mild left BESSY  Skin- no rash or jaundice  Neuro- no asterixis  Psych- normal mood    Laboratory  Lab Results   Component Value Date     09/20/2023     06/23/2021    POTASSIUM 3.8 09/20/2023    POTASSIUM 3.9 08/22/2022    POTASSIUM 4.0 06/23/2021    CHLORIDE 110 09/20/2023    CHLORIDE 116 08/22/2022    CHLORIDE 117 06/23/2021    CO2 24 09/20/2023    CO2 23 08/22/2022    CO2 25 06/23/2021    BUN 15.1 09/20/2023    BUN 12 08/22/2022    BUN 8 06/23/2021    CR 1.34 09/20/2023    CR 1.09 06/23/2021       Lab Results   Component Value Date    BILITOTAL 2.3 09/13/2023    BILITOTAL 3.6 06/23/2021    ALT 5 09/13/2023    ALT 19 06/23/2021    AST 35 09/13/2023    AST 33 06/23/2021    ALKPHOS 125 09/13/2023    ALKPHOS 165 06/23/2021       Lab Results   Component Value Date    ALBUMIN 2.9 09/13/2023    ALBUMIN 2.2 08/22/2022    ALBUMIN 2.7 06/23/2021    PROTTOTAL 5.4 09/13/2023    PROTTOTAL 5.6 06/23/2021        Lab Results   Component Value Date    WBC 4.0 10/03/2023    WBC 4.3 06/23/2021    HGB 10.1 10/03/2023    HGB 12.1 06/23/2021    MCV 94 10/03/2023    MCV 99 06/23/2021    PLT 95 10/03/2023    PLT 58 06/23/2021       Lab Results   Component Value Date    INR 1.67 09/20/2023    INR 1.79 06/23/2021       Radiology  Liver TIPS doppler U/S 10/3/2023 personally reviewed- no mass    Assessment  58 year old male who presents for follow-up  of decompensated EtOH cirrhosis complicated with ascites and hepatic encephalopathy.  MELD 3.0 = 17 (stable).  Ascites is well-controlled on current doses of diuretics and TIPS.  Hepatic encephalopathy is well-controlled on current medication regimen.  Fortunately, patient has not developed any complications of his liver disease after his recent knee surgery.  Up-to-date with HCC screening.    Plan  1.  Low Na diet  2.  Continue diuretics at current doses  3.  Continue lactulose and rifaximin  4.  Follow-up in 6 months        Again, thank you for allowing me to participate in the care of your patient.      Sincerely,    Gonzalo Bales MD

## 2023-10-03 NOTE — PROGRESS NOTES
[facility]    Follow-up Visit    Follow-up visit for cirrhosis    Subjective:  58 year old male    Cirrhosis  - ETOH  - hx ascites, TIPS placement 5/14/18, 6/6/18; TIPS revision 10/29/19, 11/25/2020, 8/11/2021, 1/28/2022  - hx HE  - hx variceal bleed Oct 2017  - ETOH hepatitis March 2019  - last EGD Jun 2019- diminutive EV, mild portal hypertensive gastropathy  - HCC screening- liver TIPS doppler U/S 10/3/2023     Last visit April 2023.  Patient underwent cystoscopy with lithotripsy in April 2023 and again in May 2023.  Patient underwent left knee TKA 2 weeks ago.  No new regular prescription medications or ER visits since last visit.    Patient is well today.  His main issues are related to postoperative knee pain.  He denies any symptoms related to liver disease.    The patient was intubated longer than expected at the time of the surgery.  He did not develop any issues with fluid retention or hepatic encephalopathy while in hospital.    Patient denies jaundice, lower extremity edema, abdominal distension, lethargy or confusion.    Patient denies melena, hematemesis or hematochezia.    Patient denies fevers, sweats or chills.      Weight stable.  Appetite is normal.    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  He is not currently working due to recovery from surgery.  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    Medical hx Surgical hx   Past Medical History:   Diagnosis Date     Alcoholic cirrhosis of liver (H)      Alcoholic hepatitis (H28) 03/2019     Chronic kidney disease     CRF     Depression      Gout      History of transfusion      Hypertension      Hypertriglyceridemia      Left calcaneus fracture 01/09/2006 January 16, 2006: Fell 10 feet from ladder onto left foot on frozen ground on 1/9/06 at home.  Immediate pain and unable to walk- seen at [place] and diagnosed with calcaneus fracture     Nephrolithiasis      Osteoarthritis       Portal vein thrombosis     left occlusion, partial main     Thrombocytopenia (H24)       Past Surgical History:   Procedure Laterality Date     ANKLE SURGERY Left      ANKLE SURGERY       ARTHROPLASTY KNEE Left 9/19/2023    Procedure: LEFT TOTAL KNEE ARTHROPLASTY;  Surgeon: José Miguel Landaverde MD;  Location: [facility] Main OR     ARTHROSCOPY KNEE       COLONOSCOPY N/A 03/31/2016    Procedure: COLONOSCOPY;  Surgeon: Rhys Uriostegui MD;  Location: [facility] GI     COMBINED CYSTOSCOPY, RETROGRADES, URETEROSCOPY, LASER HOLMIUM LITHOTRIPSY URETER(S), INSERT STENT Bilateral 7/1/2022    Procedure: CYSTOSCOPY, RIGHT RETROGRADE PYELOGRAM, RIGHT URETEROSCOPY WITH LASER LITHOTRIPSY AND BASKET REMOVAL OF STONE, RIGHT URETERAL STENT PLACEMENT, LEFT RETROGRADE PYELOGRAM, LEFT URETEROSCOPY WITH LASER LITHOTRIPSY AND BASKET REMOVAL OF STONE, LEFT URETERAL STENT PLACEMENT;  Surgeon: Dylan Galaviz MD;  Location: [facility] OR     COMBINED CYSTOSCOPY, RETROGRADES, URETEROSCOPY, LASER HOLMIUM LITHOTRIPSY URETER(S), INSERT STENT Bilateral 7/27/2022    Procedure: CYSTOSCOPY, BILATERAL URETERAL STENT REMOVAL, BILATERAL  RETROGRADE PYELOGRAM, BILATERAL URETEROSCOPY. HOLMIUM LASER LITHOTRIPSY LEFT SIDE.  AND BASKET REMOVAL OF STONES BILATERAL, LEFT  URETERAL STENT PLACEMENT;  Surgeon: Dylan Galaviz MD;  Location:  OR     COMBINED CYSTOSCOPY, RETROGRADES, URETEROSCOPY, LASER HOLMIUM LITHOTRIPSY URETER(S), INSERT STENT Left 4/24/2023    Procedure: CYSTOSCOPY, LEFT RETROGRADE PYELOGRAM, LEFT URETEROSCOPY WITH LASER LITHOTRIOPSY AND BASKET REMOVAL OF STONES, LEFT URETERAL STENT PLACEMENT;  Surgeon: Dylan Galaviz MD;  Location: [facility] OR     COMBINED CYSTOSCOPY, RETROGRADES, URETEROSCOPY, LASER HOLMIUM LITHOTRIPSY URETER(S), INSERT STENT Left 5/10/2023    Procedure: CYSTOSCOPY, LEFT URETERAL STENT REMOVAL, LEFT RETROGRADE PYELOGRAM, LEFT URETEROSCOPY WITH LASER LITHOTRIOPSY AND BASKET REMOVAL OF STONES, LEFT URETERAL STENT PLACEMENT;   Surgeon: Dylan Galaviz MD;  Location:  OR     ESOPHAGOSCOPY, GASTROSCOPY, DUODENOSCOPY (EGD), COMBINED N/A 03/31/2016    Procedure: COMBINED ESOPHAGOSCOPY, GASTROSCOPY, DUODENOSCOPY (EGD);  Surgeon: Rhys Uriostegui MD;  Location:  GI     ESOPHAGOSCOPY, GASTROSCOPY, DUODENOSCOPY (EGD), COMBINED N/A 03/09/2018    Procedure: COMBINED ESOPHAGOSCOPY, GASTROSCOPY, DUODENOSCOPY (EGD), BIOPSY SINGLE OR MULTIPLE;  EGD;  Surgeon: Gonzalo Wahl MD;  Location:  GI     ESOPHAGOSCOPY, GASTROSCOPY, DUODENOSCOPY (EGD), COMBINED N/A 06/07/2019    Procedure: ESOPHAGOGASTRODUODENOSCOPY (EGD);  Surgeon: Gonzalo Wahl MD;  Location:  GI     FOOT SURGERY       IR PARACENTESIS  10/29/2019     IR PARACENTESIS  11/25/2020     IR PARACENTESIS  08/11/2021     IR PARACENTESIS  01/28/2022     IR PARACENTESIS  03/30/2022     IR TRANSVEN INTRAHEPATIC PORTOSYST REV  10/29/2019     IR TRANSVEN INTRAHEPATIC PORTOSYST REV  11/25/2020     IR TRANSVEN INTRAHEPATIC PORTOSYST REV  08/11/2021     IR TRANSVEN INTRAHEPATIC PORTOSYST REV  01/28/2022     IR TRANSVEN INTRAHEPATIC PORTOSYST REV  03/30/2022     KNEE SURGERY Left      KNEE SURGERY Right      RELEASE CARPAL TUNNEL       RELEASE TRIGGER FINGER Right 06/11/2020    Procedure: RELEASE, TRIGGER FINGER, right ring and long finger;  Surgeon: Pascual Valencia MD;  Location: [facility] OR     SIGMOIDOSCOPY FLEXIBLE N/A 10/31/2017    Procedure: SIGMOIDOSCOPY FLEXIBLE;;  Surgeon: Armaan Adams MD;  Location:  GI     TIPS Procedure  06/06/2018     TIPS PROCEDURE  11/01/2020     ZZC PLASTY KNEE,MED OR LAT COMPARTMT Right 02/19/2021    Procedure: RIGHT UNICOMPARTMENTAL ARTHROPLASTY KNEE MEDIAL;  Surgeon: José Miguel Landaverde MD;  Location: [facility];  Service: Orthopedics          Medications  Prior to Admission medications    Medication Sig Start Date End Date Taking? Authorizing Provider   acetaminophen (TYLENOL) 500 MG tablet Take 1,000 mg by mouth every 6  hours as needed for mild pain    Yes Reported, Patient   Ascorbic Acid (VITAMIN C PO) Take 500 mg by mouth daily   Yes Reported, Patient   docusate sodium (COLACE) 100 MG tablet Take 1 tablet (100 mg) by mouth daily 5/10/23  Yes Dylan Galaviz MD   furosemide (LASIX) 20 MG tablet Take 1 tablet (20 mg) by mouth every evening 10/3/22  Yes Gonzalo Wahl MD   furosemide (LASIX) 40 MG tablet Take 1 tablet (40 mg) by mouth every morning 10/3/22  Yes Gonzalo Wahl MD   HYDROcodone-acetaminophen (NORCO) 5-325 MG tablet Take 1 tablet by mouth every 6 hours as needed for severe pain or moderate pain 9/22/23  Yes Josefina Jacob PA-C   hydrOXYzine (ATARAX) 10 MG tablet Take 1 tablet (10 mg) by mouth every 6 hours as needed for other (adjuvant pain) 9/22/23  Yes Josefina Jacob PA-C   lactulose (CHRONULAC) 10 GM/15ML solution Take 30 mLs (20 g) by mouth 3 times daily TAKE TO MAINTAIN 3 TO 4 BOWEL MOVEMENTS DAILY 9/22/23  Yes Tommy Foreman MD   Menaquinone-7 (VITAMIN K2) 100 MCG CAPS Take 200 mcg by mouth daily    Yes Reported, Patient   MULTIPLE VITAMINS PO Take 1 tablet by mouth daily   Yes Reported, Patient   potassium chloride ER (KLOR-CON M) 20 MEQ CR tablet Take 2 tablets (40 mEq) by mouth daily 10/14/22  Yes Celestino Verduzco PA-C   senna-docusate (SENOKOT-S/PERICOLACE) 8.6-50 MG tablet Take 1-2 tablets by mouth 2 times daily Take while on oral narcotics to prevent or treat constipation. 9/19/23  Yes José Miguel Landaverde MD   sertraline (ZOLOFT) 25 MG tablet Take 1 tablet (25 mg) by mouth daily 4/3/23  Yes Gonzalo Wahl MD   sildenafil (REVATIO) 20 MG tablet Take 3-5 tabs 1/2-4 hours to relations 6/7/22  Yes Abena Acuña MD   vitamin D3 (CHOLECALCIFEROL) 2000 units (50 mcg) tablet Take 1 tablet by mouth daily   Yes Unknown, Entered By History       Allergies  Allergies   Allergen Reactions     Trazodone Visual Disturbance     Oxycodone Other (See Comments)      Delirium and constipation  Delirium and constipation       Review of systems  A 10-point review of systems was negative    Examination  Wt 81.3 kg (179 lb 4.8 oz)   BMI 25.01 kg/m    Body mass index is 25.01 kg/m .    Gen- well, NAD, A+Ox3, normal color  CVS- RRR  RS- CTA  Abd- SNT, no ascites or organomegaly on palpation or percussion, BS+  Extr- hands normal, mild left BESSY  Skin- no rash or jaundice  Neuro- no asterixis  Psych- normal mood    Laboratory  Lab Results   Component Value Date     09/20/2023     06/23/2021    POTASSIUM 3.8 09/20/2023    POTASSIUM 3.9 08/22/2022    POTASSIUM 4.0 06/23/2021    CHLORIDE 110 09/20/2023    CHLORIDE 116 08/22/2022    CHLORIDE 117 06/23/2021    CO2 24 09/20/2023    CO2 23 08/22/2022    CO2 25 06/23/2021    BUN 15.1 09/20/2023    BUN 12 08/22/2022    BUN 8 06/23/2021    CR 1.34 09/20/2023    CR 1.09 06/23/2021       Lab Results   Component Value Date    BILITOTAL 2.3 09/13/2023    BILITOTAL 3.6 06/23/2021    ALT 5 09/13/2023    ALT 19 06/23/2021    AST 35 09/13/2023    AST 33 06/23/2021    ALKPHOS 125 09/13/2023    ALKPHOS 165 06/23/2021       Lab Results   Component Value Date    ALBUMIN 2.9 09/13/2023    ALBUMIN 2.2 08/22/2022    ALBUMIN 2.7 06/23/2021    PROTTOTAL 5.4 09/13/2023    PROTTOTAL 5.6 06/23/2021        Lab Results   Component Value Date    WBC 4.0 10/03/2023    WBC 4.3 06/23/2021    HGB 10.1 10/03/2023    HGB 12.1 06/23/2021    MCV 94 10/03/2023    MCV 99 06/23/2021    PLT 95 10/03/2023    PLT 58 06/23/2021       Lab Results   Component Value Date    INR 1.67 09/20/2023    INR 1.79 06/23/2021       Radiology  Liver TIPS doppler U/S 10/3/2023 personally reviewed- no mass    Assessment  58 year old male who presents for follow-up of decompensated EtOH cirrhosis complicated with ascites and hepatic encephalopathy.  MELD 3.0 = 17 (stable).  Ascites is well-controlled on current doses of diuretics and TIPS.  Hepatic encephalopathy is well-controlled  on current medication regimen.  Fortunately, patient has not developed any complications of his liver disease after his recent knee surgery.  Up-to-date with HCC screening.    Plan  1.  Low Na diet  2.  Continue diuretics at current doses  3.  Continue lactulose and rifaximin  4.  Follow-up in 6 months    Gonzalo Bales MD  Hepatology  [facility]

## 2023-10-09 ENCOUNTER — TRANSFERRED RECORDS (OUTPATIENT)
Dept: HEALTH INFORMATION MANAGEMENT | Facility: CLINIC | Age: 58
End: 2023-10-09
Payer: COMMERCIAL

## 2023-10-12 DIAGNOSIS — E87.6 HYPOKALEMIA: ICD-10-CM

## 2023-10-12 RX ORDER — POTASSIUM CHLORIDE 1500 MG/1
40 TABLET, EXTENDED RELEASE ORAL DAILY
Qty: 120 TABLET | Refills: 2 | Status: SHIPPED | OUTPATIENT
Start: 2023-10-12

## 2023-10-30 ENCOUNTER — PRE VISIT (OUTPATIENT)
Dept: MULTI SPECIALTY CLINIC | Facility: CLINIC | Age: 58
End: 2023-10-30
Payer: COMMERCIAL

## 2023-10-30 DIAGNOSIS — N20.1 LEFT URETERAL STONE: Primary | ICD-10-CM

## 2023-10-30 NOTE — TELEPHONE ENCOUNTER
Reason for Visit:  5 months Follow-up    Diagnosis: Recurrent kidney stones Urinary urgency, Elevated serum creatinine    Relevant information: Patient Office Visit with Urology, Dr. Guillaume Loyola on 07/06/23    Records/imaging/labs/orders: CT scan/imaging    Litholink in: No    Pt called: Yes, spoke with Ivan, if he has received the Litholink. Patient confirm that he has one, and understands to send the Litholink in as soon as possible.     11/08/23 - Spoke with Ivan in the regards of the Litholink. Patient confirms that he has not done the Litholink because of busy schedule and won't be able to do it in a couple of weeks. Patient confirm of wanting to reschedule appointment on 11/21/23, at 1:30 pm, with Dr Moses.     Sara Juarez MA  10/30/23  12:51 PM

## 2023-11-02 ENCOUNTER — TRANSFERRED RECORDS (OUTPATIENT)
Dept: HEALTH INFORMATION MANAGEMENT | Facility: CLINIC | Age: 58
End: 2023-11-02
Payer: COMMERCIAL

## 2023-11-08 ENCOUNTER — TELEPHONE (OUTPATIENT)
Dept: MULTI SPECIALTY CLINIC | Facility: CLINIC | Age: 58
End: 2023-11-08
Payer: COMMERCIAL

## 2023-11-24 ENCOUNTER — PATIENT OUTREACH (OUTPATIENT)
Dept: GASTROENTEROLOGY | Facility: CLINIC | Age: 58
End: 2023-11-24
Payer: COMMERCIAL

## 2023-11-27 ENCOUNTER — OFFICE VISIT (OUTPATIENT)
Dept: FAMILY MEDICINE | Facility: CLINIC | Age: 58
End: 2023-11-27
Payer: COMMERCIAL

## 2023-11-27 VITALS
HEART RATE: 89 BPM | OXYGEN SATURATION: 100 % | BODY MASS INDEX: 25.2 KG/M2 | WEIGHT: 180 LBS | TEMPERATURE: 97.3 F | DIASTOLIC BLOOD PRESSURE: 72 MMHG | SYSTOLIC BLOOD PRESSURE: 176 MMHG | RESPIRATION RATE: 16 BRPM | HEIGHT: 71 IN

## 2023-11-27 DIAGNOSIS — N18.2 CKD (CHRONIC KIDNEY DISEASE) STAGE 2, GFR 60-89 ML/MIN: ICD-10-CM

## 2023-11-27 DIAGNOSIS — D69.6 THROMBOCYTOPENIA (H): ICD-10-CM

## 2023-11-27 DIAGNOSIS — F19.20 OTHER PSYCHOACTIVE SUBSTANCE DEPENDENCE, UNCOMPLICATED (H): ICD-10-CM

## 2023-11-27 DIAGNOSIS — K70.31 ALCOHOLIC CIRRHOSIS OF LIVER WITH ASCITES (H): ICD-10-CM

## 2023-11-27 DIAGNOSIS — R51.9 ACUTE NONINTRACTABLE HEADACHE, UNSPECIFIED HEADACHE TYPE: Primary | ICD-10-CM

## 2023-11-27 LAB
ALBUMIN SERPL BCG-MCNC: 3.1 G/DL (ref 3.5–5.2)
ALP SERPL-CCNC: 157 U/L (ref 40–150)
ALT SERPL W P-5'-P-CCNC: <5 U/L (ref 0–70)
AMMONIA PLAS-SCNC: 97 UMOL/L (ref 16–60)
ANION GAP SERPL CALCULATED.3IONS-SCNC: 8 MMOL/L (ref 7–15)
AST SERPL W P-5'-P-CCNC: 30 U/L (ref 0–45)
BASOPHILS # BLD AUTO: 0 10E3/UL (ref 0–0.2)
BASOPHILS NFR BLD AUTO: 1 %
BILIRUB DIRECT SERPL-MCNC: 1.15 MG/DL (ref 0–0.3)
BILIRUB SERPL-MCNC: 3 MG/DL
BUN SERPL-MCNC: 7.7 MG/DL (ref 8–23)
CALCIUM SERPL-MCNC: 8.8 MG/DL (ref 8.8–10.2)
CHLORIDE SERPL-SCNC: 113 MMOL/L (ref 98–107)
CREAT SERPL-MCNC: 1.2 MG/DL (ref 0.67–1.17)
CREAT UR-MCNC: 75 MG/DL
CRP SERPL-MCNC: <3 MG/L
DEPRECATED HCO3 PLAS-SCNC: 23 MMOL/L (ref 22–29)
EGFRCR SERPLBLD CKD-EPI 2021: 69 ML/MIN/1.73M2
EOSINOPHIL # BLD AUTO: 0.2 10E3/UL (ref 0–0.7)
EOSINOPHIL NFR BLD AUTO: 5 %
ERYTHROCYTE [DISTWIDTH] IN BLOOD BY AUTOMATED COUNT: 16.8 % (ref 10–15)
ERYTHROCYTE [SEDIMENTATION RATE] IN BLOOD BY WESTERGREN METHOD: 8 MM/HR (ref 0–20)
GLUCOSE SERPL-MCNC: 129 MG/DL (ref 70–99)
HCT VFR BLD AUTO: 39.6 % (ref 40–53)
HGB BLD-MCNC: 13.1 G/DL (ref 13.3–17.7)
IMM GRANULOCYTES # BLD: 0 10E3/UL
IMM GRANULOCYTES NFR BLD: 0 %
LYMPHOCYTES # BLD AUTO: 1 10E3/UL (ref 0.8–5.3)
LYMPHOCYTES NFR BLD AUTO: 22 %
MCH RBC QN AUTO: 31 PG (ref 26.5–33)
MCHC RBC AUTO-ENTMCNC: 33.1 G/DL (ref 31.5–36.5)
MCV RBC AUTO: 94 FL (ref 78–100)
MICROALBUMIN UR-MCNC: 37.9 MG/L
MICROALBUMIN/CREAT UR: 50.53 MG/G CR (ref 0–17)
MONOCYTES # BLD AUTO: 0.4 10E3/UL (ref 0–1.3)
MONOCYTES NFR BLD AUTO: 9 %
NEUTROPHILS # BLD AUTO: 2.9 10E3/UL (ref 1.6–8.3)
NEUTROPHILS NFR BLD AUTO: 63 %
NRBC # BLD AUTO: 0 10E3/UL
NRBC BLD AUTO-RTO: 0 /100
PLATELET # BLD AUTO: 85 10E3/UL (ref 150–450)
POTASSIUM SERPL-SCNC: 3.6 MMOL/L (ref 3.4–5.3)
PROT SERPL-MCNC: 5.8 G/DL (ref 6.4–8.3)
RBC # BLD AUTO: 4.23 10E6/UL (ref 4.4–5.9)
SODIUM SERPL-SCNC: 144 MMOL/L (ref 135–145)
WBC # BLD AUTO: 4.5 10E3/UL (ref 4–11)

## 2023-11-27 PROCEDURE — 85025 COMPLETE CBC W/AUTO DIFF WBC: CPT | Performed by: PHYSICIAN ASSISTANT

## 2023-11-27 PROCEDURE — 82043 UR ALBUMIN QUANTITATIVE: CPT | Performed by: PHYSICIAN ASSISTANT

## 2023-11-27 PROCEDURE — 82570 ASSAY OF URINE CREATININE: CPT | Performed by: PHYSICIAN ASSISTANT

## 2023-11-27 PROCEDURE — 80053 COMPREHEN METABOLIC PANEL: CPT | Performed by: PHYSICIAN ASSISTANT

## 2023-11-27 PROCEDURE — 86140 C-REACTIVE PROTEIN: CPT | Performed by: PHYSICIAN ASSISTANT

## 2023-11-27 PROCEDURE — 85652 RBC SED RATE AUTOMATED: CPT | Performed by: PHYSICIAN ASSISTANT

## 2023-11-27 PROCEDURE — 82248 BILIRUBIN DIRECT: CPT | Performed by: PHYSICIAN ASSISTANT

## 2023-11-27 PROCEDURE — 99215 OFFICE O/P EST HI 40 MIN: CPT | Performed by: PHYSICIAN ASSISTANT

## 2023-11-27 PROCEDURE — 36415 COLL VENOUS BLD VENIPUNCTURE: CPT | Performed by: PHYSICIAN ASSISTANT

## 2023-11-27 PROCEDURE — 82140 ASSAY OF AMMONIA: CPT | Performed by: PHYSICIAN ASSISTANT

## 2023-11-27 NOTE — PROGRESS NOTES
Assessment & Plan     Acute nonintractable headache, unspecified headache type    Differentials for his symptoms are quite broad given his presentation today. I have considered temporal arteritis initially, however pain is more in back of head/neck.  No tenderness over temples and inflammatory markers are normal.  Symptoms may be secondary to a tension type of headache, he has been under more stress.  May be due to sinus pressure, however no excessive congestion or feelings of acute illness noted.  I also considered a possible hepatic encephalopathy, especially given his recent episode of confusion and him not taking his medications consistently.   CT scan of the head was obtained and normal, which is reassuring.  Labs overall are stable.  Reviewed findings with his GI specialist, Dr. Bales, who did not advise checking an ammonia level going forward in a cirrhotic patient as this does not necessarily give us any further information.       At the time of his visit, I advised him to start taking his lactulose more consistently.  Follow-up call on 11/29/23 done and he states the headaches are resolved.  Still feeling some fatigue and nausea, which he thinks will improve the longer he continues to take the lactulose consistently. I was in agreement.  He also does c/o increased stress, if symptoms persist, I suggest he follow-up with his PCP and may need to evaluate for depression/anxiety in more detail.      - ESR: Erythrocyte sedimentation rate  - CRP, inflammation  - Basic metabolic panel  (Ca, Cl, CO2, Creat, Gluc, K, Na, BUN)  - Hepatic panel (Albumin, ALT, AST, Bili, Alk Phos, TP)  - CBC with platelets and differential  - Ammonia  - CT Head w/o Contrast; Future    Alcoholic cirrhosis of liver with ascites (H)  Managed by liver specialist.  Discussed importance of restarting his medications to help his body clear the toxins   - ESR: Erythrocyte sedimentation rate  - CRP, inflammation  - Basic metabolic panel  (Ca,  Urgent Care Discharge Call Back    Person Contacted: mother    This is Suzanna Martel RN calling as a courtesy from [facility], [place].    Tootie Ruggiero NP wanted me to call you to see how you are doing.  You do not need to return this call, however, if you have any questions or concerns, please feel free to call us back at [phone].     "Cl, CO2, Creat, Gluc, K, Na, BUN)  - Hepatic panel (Albumin, ALT, AST, Bili, Alk Phos, TP)  - CBC with platelets and differential  - Ammonia  - CT Head w/o Contrast; Future    CKD (chronic kidney disease) stage 2, GFR 60-89 ml/min  Stable.   - Albumin Random Urine Quantitative with Creat Ratio        (F19.20) Other psychoactive substance dependence, uncomplicated (H)  Comment: remains sober.   Plan:     (D69.6) Thrombocytopenia (H24)  Comment: platelets stable.   Plan:         42 minutes spent by me on the date of the encounter doing chart review, history and exam, documentation and further activities per the note       BMI:   Estimated body mass index is 25.1 kg/m  as calculated from the following:    Height as of this encounter: 1.803 m (5' 11\").    Weight as of this encounter: 81.6 kg (180 lb).       CONSULTATION/REFERRAL to PCP if symptoms change or persist.     Citlali Morgan PA-C  [facility] is a 58 year old, presenting for the following health issues:  Headache        11/27/2023     1:11 PM   Additional Questions   Roomed by Leonarda   Accompanied by Self         11/27/2023     1:11 PM   Patient Reported Additional Medications   Patient reports taking the following new medications na       HPI     SUBJECTIVE:    Ivan Bell is a 58 year old male who complains of headaches x over 1 month.     Description of pain: sharp pain, squeezing pain, unilateral but varies as to which side and no matter where pain moves to there is always pain over forehead. Duration of individual headaches: varies, from a few hours to a few days, frequency is daily. Associated symptoms: nausea, sinus pressure, and neck stiffness. Pain relief: acetaminophen. Precipitating factors: patient is aware of none. He denies a history of recent head injury.   Prior neurological history: negative for no neurological problems.    He did not take medication on Thursday and felt quite sick. He got " "quite confused.  He's been skipping his meds off and on for the past 2-3 months due to work schedule fluctuating.  He states if he takes his lactulose, he needs to use the restroom more often and he can't always use the restroom when working.   Dr. Bales is his liver specialist, last seen on 10/3/23 and stable.  Recheck in 6 months was advised.   No abdominal pain.    He does c/o nausea, no vomiting.      Vision changes no  Jaw claudication no  Unexplained fever or constituational symptoms no    C/o burning pain over forehead and on both sides of neck for the past month (off and on).  No midline neck pain.   He does have some sinus congestion.    No sore throat or cough.     The pain in back neck comes and goes and is sharp at times,  Pain can be on both side of back of neck.     He uses tylenol with some relief.      Had knee surgery on left, things are healing up well per patient.      Misses lactulose every 2-3 days.  Feels tired if he misses it.    Not drinking any alcohol.               Review of Systems   Constitutional, HEENT, cardiovascular, pulmonary, GI, , musculoskeletal, neuro, skin, endocrine and psych systems are negative, except as otherwise noted.      Objective    BP (!) 176/72   Pulse 89   Temp 97.3  F (36.3  C) (Tympanic)   Resp 16   Ht 1.803 m (5' 11\")   Wt 81.6 kg (180 lb)   SpO2 100%   BMI 25.10 kg/m    Body mass index is 25.1 kg/m .  Physical Exam   GENERAL: healthy, alert and no distress  EYES: Eyes grossly normal to inspection, PERRL and conjunctivae and sclerae normal, no jaundice  NECK: no adenopathy, no asymmetry, masses, or scars and thyroid normal to palpation  RESP: lungs clear to auscultation - no rales, rhonchi or wheezes  CV: regular rate and rhythm, normal S1 S2, no S3 or S4, no murmur, click or rub, no peripheral edema and peripheral pulses strong  ABDOMEN: soft, nontender, no hepatosplenomegaly, no masses and bowel sounds normal  MS: no gross musculoskeletal defects noted, " no edema    Neurological Examination:  Mental Status:  Alert and oriented to time, place, and person.  Recent and remote memory intact.  Attention span and concentration normal.  Adequate fund of knowledge.  Speech:  Normal  Cranial Nerves:  Cranial Nerve #2: Visual acuity normal.  Pupils equal and reacting to light and to accomodation.    Cranial #3, 4, 6:  Eye movements normal in all directions of gaze  Cranial #5: Motor function normal.  Cranial #7: Face symmetrical in appearance and movement.  Cranial #8: Normal hearing to whispered sounds.  Cranial #9 & 10: Normal palate and uvula movement  Cranial #11:  Shoulder shrug symmetrical  Cranial #12:  Tongue midline with normal movements.  Motor:  Tone:  Normal in both upper and lower limbs.  Bulk:  Normal in both upper and lower limbs  Power: No drift of the outstretched hands.  Normal strength in all muscle groups (5/5) of both upper and lower limbs.  Coordination:  heel-shin test normal bilaterally, needs to hold onto table for balance.   Reflexes: Deep tendon reflexes 2+ and symmetrical both sides in lower extremities.  Gait:  Walk with a normal stride length and normal arm swing.  Normal tandem walking.  Can stand/walk on heels and toes.  Romberg sign: Negative.        SKIN: no suspicious lesions or rashes    MUSCULOSKELETAL:  Inspection: normal cervical lordosis  Tender:  left paracervical muscles, right paracervical muscles, left trapezius muscles, right trapezius muscles  Non-tender:  spinous processes  Range of Motion:  Full ROM of cervical spine  Strength: Full strength of all neck muscles

## 2023-11-27 NOTE — PATIENT INSTRUCTIONS
It is very important to take your medication consistently.  Please ensure you are taking the lactulose 3 times per day.  I am concerned that you missing these doses is causing your symptoms.      We'll check labs today and a CT scan of the brain.  If your symptoms change or worsen, I suggest you be evaluated in the ER urgently.      I recommend you schedule a follow-up visit with your liver specialist as well as a recheck with your PCP (Dr. Washington), within 1 month.

## 2023-11-28 ENCOUNTER — HOSPITAL ENCOUNTER (OUTPATIENT)
Dept: CT IMAGING | Facility: CLINIC | Age: 58
Discharge: HOME | End: 2023-11-28
Attending: PHYSICIAN ASSISTANT | Admitting: PHYSICIAN ASSISTANT
Payer: COMMERCIAL

## 2023-11-28 DIAGNOSIS — K70.31 ALCOHOLIC CIRRHOSIS OF LIVER WITH ASCITES (H): ICD-10-CM

## 2023-11-28 DIAGNOSIS — R51.9 ACUTE NONINTRACTABLE HEADACHE, UNSPECIFIED HEADACHE TYPE: ICD-10-CM

## 2023-11-28 PROCEDURE — 70450 CT HEAD/BRAIN W/O DYE: CPT

## 2023-11-29 PROBLEM — F19.20 OTHER PSYCHOACTIVE SUBSTANCE DEPENDENCE, UNCOMPLICATED (H): Status: ACTIVE | Noted: 2023-11-29

## 2023-11-29 NOTE — PROGRESS NOTES
Friday 11/24/23 Patient's spouse Elicia called to report lethargy, confusion and belligerence from patient after he had been skipping doses of lactulose on and off recently. Reviewed plan to increase lactulose until patient begins having stools and alertness returns. Also instructed to call 911 if unable to get him to take the lactulose, as his condition will decline further without. Elicia verbalized understanding and agreeable to plan.  Tuesday 11/28/23  Patient is feeling more alert and no longer confused, although still feeling sluggish and tired. He is having about 1 bowel movement daily. Discussed lactulose titration to achieve 2-3 bowel movements daily, and to adjust as needed to maintain 2-3 stools daily without loose stools or confusion. Patient will increase his lactulose usage until feeling back to his baseline.  Patient also complaining of ongoing headaches and had appointment with PCP to discuss, ordered head CT. Reviewed with patient this is most likely unrelated to his liver disease.   Patient verbalized understanding and will update clinic with any changes.

## 2023-12-26 DIAGNOSIS — K70.31 ALCOHOLIC CIRRHOSIS OF LIVER WITH ASCITES (H): ICD-10-CM

## 2023-12-26 RX ORDER — FUROSEMIDE 20 MG
20 TABLET ORAL EVERY EVENING
Qty: 90 TABLET | Refills: 3 | Status: SHIPPED | OUTPATIENT
Start: 2023-12-26

## 2024-01-05 ENCOUNTER — OFFICE VISIT (OUTPATIENT)
Dept: INTERNAL MEDICINE | Facility: CLINIC | Age: 59
End: 2024-01-05
Payer: COMMERCIAL

## 2024-01-05 VITALS
TEMPERATURE: 97.9 F | DIASTOLIC BLOOD PRESSURE: 60 MMHG | SYSTOLIC BLOOD PRESSURE: 138 MMHG | HEIGHT: 71 IN | BODY MASS INDEX: 25.65 KG/M2 | WEIGHT: 183.2 LBS | HEART RATE: 83 BPM | OXYGEN SATURATION: 99 % | RESPIRATION RATE: 10 BRPM

## 2024-01-05 DIAGNOSIS — H60.392 OTHER INFECTIVE ACUTE OTITIS EXTERNA OF LEFT EAR: Primary | ICD-10-CM

## 2024-01-05 PROCEDURE — 99213 OFFICE O/P EST LOW 20 MIN: CPT | Performed by: INTERNAL MEDICINE

## 2024-01-05 RX ORDER — NEOMYCIN SULFATE, POLYMYXIN B SULFATE, HYDROCORTISONE 3.5; 10000; 1 MG/ML; [USP'U]/ML; MG/ML
3 SOLUTION/ DROPS AURICULAR (OTIC) 4 TIMES DAILY
Qty: 10 ML | Refills: 0 | Status: SHIPPED | OUTPATIENT
Start: 2024-01-05 | End: 2024-04-03

## 2024-01-05 RX ORDER — CIPROFLOXACIN AND DEXAMETHASONE 3; 1 MG/ML; MG/ML
4 SUSPENSION/ DROPS AURICULAR (OTIC) 2 TIMES DAILY
Qty: 7.5 ML | Refills: 1 | Status: SHIPPED | OUTPATIENT
Start: 2024-01-05 | End: 2024-01-15

## 2024-01-05 NOTE — PROGRESS NOTES
[facility] - Primary [facility] Specialists    Comprehensive and complex medical care - Chronic disease management - Shared decision making - Care coordination - Compassionate care    Patient advocacy - Rational deprescribing - Minimally disruptive medicine - Ethical focus - Customized care         Date of Service: 1/5/2024  Primary Provider: Too Washington    Patient Care Team:  Too Washington DO as PCP - General (Family Practice)  Devin Diego MD as MD (Family Practice)  Gonzalo Wahl MD as MD (Gastroenterology)  Jelani Tristan MD as MD (Radiology)  Vaishali Goff, RN as Specialty Care Coordinator (Hepatology)  Gonzalo Wahl MD as Assigned Gastroenterology Provider  Dylan Galaviz MD as MD (Urology)  Jose Tripp MD as MD (Critical Care)  Dylan Galaviz MD as Assigned Surgical Provider  Alma Moses MD as MD (Nephrology)  Alma Moses MD as Assigned Nephrology Provider  Too Washington DO as Assigned PCP  Rickie Fox, PhD as Assigned Behavioral Health Provider          Patient's Pharmacy:    [facility] [place], [place] - [address]  [address] [address]  Phone: [phone] Fax: [phone]    [facility] [place], [place] - [address]  [address] [address]  Phone: [phone] Fax: [phone]    [facility] [place], [place] - [id number] [address]  Phone: [phone] Fax: [phone]     Patient's Contacts:  Name Home Phone Work Phone Mobile Phone Relationship LgPATTY Tim [phone] Spouse      Patient's Insurance:    Payor: [employer] / Plan: [employer] OPEN ACCESS / Product Type: HMO /           Current Outpatient Medications   Medication Instructions    acetaminophen (TYLENOL) 1,000 mg, Oral, EVERY 6 HOURS PRN     Ascorbic Acid (VITAMIN C PO) 500 mg, Oral, DAILY    docusate sodium (COLACE) 100 mg, Oral, DAILY    furosemide (LASIX) 40 mg, Oral, EVERY MORNING    furosemide (LASIX) 20 mg, Oral, EVERY EVENING    HYDROcodone-acetaminophen (NORCO) 5-325 MG tablet 1 tablet, Oral, EVERY 6 HOURS PRN    hydrOXYzine HCl (ATARAX) 10 mg, Oral, EVERY 6 HOURS PRN    lactulose (CHRONULAC) 20 g, Oral, 3 TIMES DAILY, TAKE TO MAINTAIN 3 TO 4 BOWEL MOVEMENTS DAILY    MULTIPLE VITAMINS PO 1 tablet, Oral, DAILY    neomycin-polymyxin-hydrocortisone (CORTISPORIN) 3.5-80559-2 otic solution 3 drops, Otic, 4 TIMES DAILY    potassium chloride ER (KLOR-CON M) 20 MEQ CR tablet 40 mEq, Oral, DAILY    senna-docusate (SENOKOT-S/PERICOLACE) 8.6-50 MG tablet 1-2 tablets, Oral, 2 TIMES DAILY, Take while on oral narcotics to prevent or treat constipation.    sertraline (ZOLOFT) 25 mg, Oral, DAILY    sildenafil (REVATIO) 20 MG tablet Take 3-5 tabs 1/2-4 hours to relations    vitamin D3 (CHOLECALCIFEROL) 2000 units (50 mcg) tablet 1 tablet, Oral, DAILY    Vitamin K2 200 mcg, Oral, DAILY        Subjective:      Ivan Bell is a 58 year old male who comes in today for:    Chief Complaint   Patient presents with    Ear Problem     Pain          1/5/2024     8:36 AM   Additional Questions   Roomed by Eric MITTAL       Patient comes in today for left ear pain of 3 to 4 weeks duration.  This may have been preceded by a head cold.  He still has some congestion.    He does [place] any severe respiratory symptoms still.  He does not really have a cough.  He denies any fevers or chills.  He has had decreased hearing in the left ear compared to the right.      It is tender to the touch-the external ear.  He has some pain near the jaw as well.    It hurts for him to chew with his jaw pain.  There has been no rashes noted.  No major drainage from the ear.    Objective:     Wt Readings from Last 3 Encounters:   01/05/24 83.1 kg (183 lb 3.2 oz)   11/27/23 81.6 kg (180 lb)  "  10/03/23 81.3 kg (179 lb 4.8 oz)     BP Readings from Last 3 Encounters:   01/05/24 138/60   11/27/23 (!) 176/72   10/03/23 (!) 147/75      /60   Pulse 83   Temp 97.9  F (36.6  C) (Oral)   Resp 10   Ht 1.803 m (5' 11\")   Wt 83.1 kg (183 lb 3.2 oz)   SpO2 99%   BMI 25.55 kg/m     In general, the patient is comfortable, no apparent distress.  Mood good.  Insight good.  Eyes are nonicteric.  No conjunctivitis noted.  Left ear shows external otitis.  There is tenderness with touching the external ear.  There is some mild tenderness near the jaw.  There is some pustular drainage in the left ear with redness of the canal.  The right ear is normal.  There is no otitis media.  Nose reveals mild rhinitis.  Throat is clear.  Neck is supple without adenopathy.    Diagnostics:     No results found for any visits on 01/05/24.     Assessment:     1. Other infective acute otitis externa of left ear        Plan:     Will send in Ciprodex as Cortisporin otic not available.  Let us know if this is not working for him and we can consider further treatment or referral.    No orders of the defined types were placed in this encounter.      Jovi Goins MD  General Internal Medicine  [facility]    Return if symptoms worsen or fail to improve.     Future Appointments   Date Time Provider Department Center   2/27/2024  4:30 PM Alma Moses MD [facility]   4/3/2024  7:30 AM  LAB [facility]   4/3/2024  7:50 AM [facility]   4/3/2024  8:30 AM Gonzalo Wahl MD [facility]       "

## 2024-01-05 NOTE — PROGRESS NOTES
"  {PROVIDER CHARTING PREFERENCE:196377}    Ananda Love is a 58 year old, presenting for the following health issues:  Ear Problem (Pain)  {(!) Visit Details have not yet been documented.  Please enter Visit Details and then use this list to pull in documentation. (Optional):341801}    Ear Problem         {MA/LPN/RN Pre-Provider Visit Orders- hCG/UA/Strep (Optional):068757}  Concern - Ear Pain  Onset: Started about 3 weeks   Description: Painful where you can't even touch it.  Intensity: severe  Progression of Symptoms:  same  Accompanying Signs & Symptoms: N/a  Previous history of similar problem: N/a  Precipitating factors:        Worsened by: n/a  Alleviating factors:        Improved by: n/a  Therapies tried and outcome: Vicks and cotton  {additonal problems for provider to add (Optional):040728}      Review of Systems   HENT:  Positive for ear pain.       {ROS COMP (Optional):348256}      Objective    /60   Pulse 83   Temp 97.9  F (36.6  C) (Oral)   Resp 10   Ht 1.803 m (5' 11\")   Wt 83.1 kg (183 lb 3.2 oz)   SpO2 99%   BMI 25.55 kg/m    Body mass index is 25.55 kg/m .  Physical Exam   {Exam List (Optional):013246}    {Diagnostic Test Results (Optional):911460}    {AMBULATORY ATTESTATION (Optional):654395}              "

## 2024-01-16 ENCOUNTER — MEDICAL CORRESPONDENCE (OUTPATIENT)
Dept: HEALTH INFORMATION MANAGEMENT | Facility: CLINIC | Age: 59
End: 2024-01-16
Payer: COMMERCIAL

## 2024-01-16 ENCOUNTER — PRE VISIT (OUTPATIENT)
Dept: MULTI SPECIALTY CLINIC | Facility: CLINIC | Age: 59
End: 2024-01-16
Payer: COMMERCIAL

## 2024-01-16 NOTE — TELEPHONE ENCOUNTER
Reason for Visit:  5 months Follow-up    Diagnosis: Recurrent kidney stones Urinary urgency, Elevated serum creatinine     Records/imaging/labs/orders: CT Abdomen pelvis w/o contrast, Renal Panel - Mychart massage Patient    [employer] in: No, Fax and Mychart massage Patient    Contact Patient: n/a    Sara Juarez MA  01/16/24  8:21 AM

## 2024-01-31 ENCOUNTER — PATIENT OUTREACH (OUTPATIENT)
Dept: GASTROENTEROLOGY | Facility: CLINIC | Age: 59
End: 2024-01-31
Payer: COMMERCIAL

## 2024-01-31 NOTE — PROGRESS NOTES
Patient called with some concerns. First he states he is not sleeping again. He has had some trouble with this in the past but he had been sleeping well again up until recently. Patient states he has trouble falling asleep and waking during the night and being unable to return to sleep. He is always feeling unrested. 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 He has to quickly sit down to adjust to the position change and then the dizziness and shakiness goes away. Patient does not feel he is drinking enough fluids, especially during the work day. He is unsure if he has issues currently with low blood pressure.patient denies issues with dizziness with position changes at home, only at work.  Suggested patient try melatonin 2 mg nightly, about 30-45 minutes before going to bed, and to give it a try for about a week.  Also suggested patient try to increase his fluid intake during his work day, and to start the day out with a large glass of water. He should gradually change positions, and stand up multiple times during his work day rather than staying stooped over for extended periods and then standing.  Writer also strongly encouraged patient to make an appointment with his PCP to discuss both of these concerns as they may have other suggestions or testing for him. Patient verbalized understanding, agreeable to scheduling with PCP, and has no further questions at this time.

## 2024-03-25 ENCOUNTER — PATIENT OUTREACH (OUTPATIENT)
Dept: GASTROENTEROLOGY | Facility: CLINIC | Age: 59
End: 2024-03-25
Payer: COMMERCIAL

## 2024-04-02 ENCOUNTER — DOCUMENTATION ONLY (OUTPATIENT)
Dept: GASTROENTEROLOGY | Facility: CLINIC | Age: 59
End: 2024-04-02
Payer: COMMERCIAL

## 2024-04-02 DIAGNOSIS — K70.31 ALCOHOLIC CIRRHOSIS OF LIVER WITH ASCITES (H): Primary | ICD-10-CM

## 2024-04-03 ENCOUNTER — OFFICE VISIT (OUTPATIENT)
Dept: GASTROENTEROLOGY | Facility: CLINIC | Age: 59
End: 2024-04-03
Attending: INTERNAL MEDICINE
Payer: COMMERCIAL

## 2024-04-03 ENCOUNTER — LAB (OUTPATIENT)
Dept: LAB | Facility: CLINIC | Age: 59
End: 2024-04-03
Attending: INTERNAL MEDICINE
Payer: COMMERCIAL

## 2024-04-03 ENCOUNTER — ANCILLARY PROCEDURE (OUTPATIENT)
Dept: ULTRASOUND IMAGING | Facility: CLINIC | Age: 59
End: 2024-04-03
Attending: INTERNAL MEDICINE
Payer: COMMERCIAL

## 2024-04-03 VITALS
WEIGHT: 183 LBS | HEART RATE: 79 BPM | TEMPERATURE: 97.4 F | BODY MASS INDEX: 25.52 KG/M2 | SYSTOLIC BLOOD PRESSURE: 174 MMHG | OXYGEN SATURATION: 98 % | DIASTOLIC BLOOD PRESSURE: 83 MMHG

## 2024-04-03 DIAGNOSIS — K70.31 ALCOHOLIC CIRRHOSIS OF LIVER WITH ASCITES (H): ICD-10-CM

## 2024-04-03 DIAGNOSIS — K70.30 ALCOHOLIC CIRRHOSIS OF LIVER WITHOUT ASCITES (H): ICD-10-CM

## 2024-04-03 DIAGNOSIS — Z86.0100 HISTORY OF COLONIC POLYPS: Primary | ICD-10-CM

## 2024-04-03 LAB
ALBUMIN SERPL BCG-MCNC: 2.9 G/DL (ref 3.5–5.2)
ALP SERPL-CCNC: 124 U/L (ref 40–150)
ALT SERPL W P-5'-P-CCNC: <5 U/L (ref 0–70)
ANION GAP SERPL CALCULATED.3IONS-SCNC: 7 MMOL/L (ref 7–15)
AST SERPL W P-5'-P-CCNC: 35 U/L (ref 0–45)
BILIRUB DIRECT SERPL-MCNC: 1.14 MG/DL (ref 0–0.3)
BILIRUB SERPL-MCNC: 2.9 MG/DL
BUN SERPL-MCNC: 14 MG/DL (ref 8–23)
CALCIUM SERPL-MCNC: 9 MG/DL (ref 8.8–10.2)
CHLORIDE SERPL-SCNC: 111 MMOL/L (ref 98–107)
CREAT SERPL-MCNC: 1.35 MG/DL (ref 0.67–1.17)
DEPRECATED HCO3 PLAS-SCNC: 23 MMOL/L (ref 22–29)
EGFRCR SERPLBLD CKD-EPI 2021: 60 ML/MIN/1.73M2
ERYTHROCYTE [DISTWIDTH] IN BLOOD BY AUTOMATED COUNT: 15.9 % (ref 10–15)
GLUCOSE SERPL-MCNC: 106 MG/DL (ref 70–99)
HCT VFR BLD AUTO: 35 % (ref 40–53)
HGB BLD-MCNC: 12.1 G/DL (ref 13.3–17.7)
INR PPP: 1.69 (ref 0.85–1.15)
MCH RBC QN AUTO: 31.9 PG (ref 26.5–33)
MCHC RBC AUTO-ENTMCNC: 34.6 G/DL (ref 31.5–36.5)
MCV RBC AUTO: 92 FL (ref 78–100)
PLATELET # BLD AUTO: 71 10E3/UL (ref 150–450)
POTASSIUM SERPL-SCNC: 4.2 MMOL/L (ref 3.4–5.3)
PROT SERPL-MCNC: 5.7 G/DL (ref 6.4–8.3)
RBC # BLD AUTO: 3.79 10E6/UL (ref 4.4–5.9)
SODIUM SERPL-SCNC: 141 MMOL/L (ref 135–145)
WBC # BLD AUTO: 4.8 10E3/UL (ref 4–11)

## 2024-04-03 PROCEDURE — 99214 OFFICE O/P EST MOD 30 MIN: CPT | Performed by: INTERNAL MEDICINE

## 2024-04-03 PROCEDURE — 82248 BILIRUBIN DIRECT: CPT | Performed by: PATHOLOGY

## 2024-04-03 PROCEDURE — 93975 VASCULAR STUDY: CPT | Performed by: RADIOLOGY

## 2024-04-03 PROCEDURE — G2211 COMPLEX E/M VISIT ADD ON: HCPCS | Performed by: INTERNAL MEDICINE

## 2024-04-03 PROCEDURE — 76700 US EXAM ABDOM COMPLETE: CPT | Mod: XU | Performed by: RADIOLOGY

## 2024-04-03 PROCEDURE — 85027 COMPLETE CBC AUTOMATED: CPT | Performed by: PATHOLOGY

## 2024-04-03 PROCEDURE — 85610 PROTHROMBIN TIME: CPT | Performed by: PATHOLOGY

## 2024-04-03 PROCEDURE — 99213 OFFICE O/P EST LOW 20 MIN: CPT | Performed by: INTERNAL MEDICINE

## 2024-04-03 PROCEDURE — 80053 COMPREHEN METABOLIC PANEL: CPT | Performed by: PATHOLOGY

## 2024-04-03 PROCEDURE — 36415 COLL VENOUS BLD VENIPUNCTURE: CPT | Performed by: PATHOLOGY

## 2024-04-03 RX ORDER — EPLERENONE 50 MG/1
50 TABLET, FILM COATED ORAL DAILY
COMMUNITY
Start: 2024-02-22

## 2024-04-03 ASSESSMENT — PAIN SCALES - GENERAL: PAINLEVEL: NO PAIN (0)

## 2024-04-03 NOTE — PROGRESS NOTES
[facility] Hepatology    Follow-up Visit    Follow-up visit for cirrhosis    Subjective:  58 year old male    Cirrhosis  - ETOH  - hx ascites, TIPS placement 5/14/18, 6/6/18; TIPS revision 10/29/19, 11/25/2020, 8/11/2021, 1/28/2022  - hx HE  - hx variceal bleed Oct 2017  - ETOH hepatitis March 2019  - last EGD Jun 2019- diminutive EV, mild portal hypertensive gastropathy  - HCC screening- liver TIPS doppler U/S 4/3/2024     Last visit October 2023.  No new medications, ER visits or hospital admissions since last visit.    Patient is well today.  He has no symptoms related to liver disease.  Patient is making progress with his mobility following his left TKA last year.    Patient denies jaundice, lower extremity edema, abdominal distension, lethargy or confusion.  Bowels are moving 2-3 times a day.    Patient denies melena, hematemesis or hematochezia.    Patient denies fevers, sweats or chills.  He has not had his annual influenza and COVID-19 vaccinations this winter.    Weight stable.  Appetite is normal.    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      Medical hx Surgical hx   Past Medical History:   Diagnosis Date    Alcoholic cirrhosis of liver (H)     Alcoholic hepatitis (H28) 03/2019    Chronic kidney disease     CRF    Depression     Gout     History of transfusion     Hypertension     Hypertriglyceridemia     Left calcaneus fracture 01/09/2006 January 16, 2006: Fell 10 feet from ladder onto left foot on frozen ground on 1/9/06 at home.  Immediate pain and unable to walk- seen at [place] and diagnosed with calcaneus fracture    Nephrolithiasis     Osteoarthritis     Portal vein thrombosis     left occlusion, partial main    Thrombocytopenia (H24)       Past Surgical History:   Procedure Laterality Date    ANKLE SURGERY Left     ANKLE SURGERY      ARTHROPLASTY KNEE Left  9/19/2023    Procedure: LEFT TOTAL KNEE ARTHROPLASTY;  Surgeon: José Miguel Landaverde MD;  Location: [facility] OR    ARTHROSCOPY KNEE      COLONOSCOPY N/A 03/31/2016    Procedure: COLONOSCOPY;  Surgeon: Rhys Uriostegui MD;  Location: [facility] GI    COMBINED CYSTOSCOPY, RETROGRADES, URETEROSCOPY, LASER HOLMIUM LITHOTRIPSY URETER(S), INSERT STENT Bilateral 7/1/2022    Procedure: CYSTOSCOPY, RIGHT RETROGRADE PYELOGRAM, RIGHT URETEROSCOPY WITH LASER LITHOTRIPSY AND BASKET REMOVAL OF STONE, RIGHT URETERAL STENT PLACEMENT, LEFT RETROGRADE PYELOGRAM, LEFT URETEROSCOPY WITH LASER LITHOTRIPSY AND BASKET REMOVAL OF STONE, LEFT URETERAL STENT PLACEMENT;  Surgeon: Dylan Galaviz MD;  Location:  OR    COMBINED CYSTOSCOPY, RETROGRADES, URETEROSCOPY, LASER HOLMIUM LITHOTRIPSY URETER(S), INSERT STENT Bilateral 7/27/2022    Procedure: CYSTOSCOPY, BILATERAL URETERAL STENT REMOVAL, BILATERAL  RETROGRADE PYELOGRAM, BILATERAL URETEROSCOPY. HOLMIUM LASER LITHOTRIPSY LEFT SIDE.  AND BASKET REMOVAL OF STONES BILATERAL, LEFT  URETERAL STENT PLACEMENT;  Surgeon: Dylan Galaviz MD;  Location: [facility] OR    COMBINED CYSTOSCOPY, RETROGRADES, URETEROSCOPY, LASER HOLMIUM LITHOTRIPSY URETER(S), INSERT STENT Left 4/24/2023    Procedure: CYSTOSCOPY, LEFT RETROGRADE PYELOGRAM, LEFT URETEROSCOPY WITH LASER LITHOTRIOPSY AND BASKET REMOVAL OF STONES, LEFT URETERAL STENT PLACEMENT;  Surgeon: Dylan Galaviz MD;  Location:  OR    COMBINED CYSTOSCOPY, RETROGRADES, URETEROSCOPY, LASER HOLMIUM LITHOTRIPSY URETER(S), INSERT STENT Left 5/10/2023    Procedure: CYSTOSCOPY, LEFT URETERAL STENT REMOVAL, LEFT RETROGRADE PYELOGRAM, LEFT URETEROSCOPY WITH LASER LITHOTRIOPSY AND BASKET REMOVAL OF STONES, LEFT URETERAL STENT PLACEMENT;  Surgeon: Dylan Galaviz MD;  Location:  OR    ESOPHAGOSCOPY, GASTROSCOPY, DUODENOSCOPY (EGD), COMBINED N/A 03/31/2016    Procedure: COMBINED ESOPHAGOSCOPY, GASTROSCOPY, DUODENOSCOPY (EGD);  Surgeon: Rhys Uriostegui  Luis Manuel PHILLIPS MD;  Location:  GI    ESOPHAGOSCOPY, GASTROSCOPY, DUODENOSCOPY (EGD), COMBINED N/A 03/09/2018    Procedure: COMBINED ESOPHAGOSCOPY, GASTROSCOPY, DUODENOSCOPY (EGD), BIOPSY SINGLE OR MULTIPLE;  EGD;  Surgeon: Gonzalo Wahl MD;  Location:  GI    ESOPHAGOSCOPY, GASTROSCOPY, DUODENOSCOPY (EGD), COMBINED N/A 06/07/2019    Procedure: ESOPHAGOGASTRODUODENOSCOPY (EGD);  Surgeon: Gonzalo Wahl MD;  Location:  GI    FOOT SURGERY      IR PARACENTESIS  10/29/2019    IR PARACENTESIS  11/25/2020    IR PARACENTESIS  08/11/2021    IR PARACENTESIS  01/28/2022    IR PARACENTESIS  03/30/2022    IR PARACENTESIS  7/23/2021    IR PARACENTESIS  8/5/2021    IR PARACENTESIS  4/27/2022    IR TRANSVEN INTRAHEPATIC PORTOSYST REV  10/29/2019    IR TRANSVEN INTRAHEPATIC PORTOSYST REV  11/25/2020    IR TRANSVEN INTRAHEPATIC PORTOSYST REV  08/11/2021    IR TRANSVEN INTRAHEPATIC PORTOSYST REV  01/28/2022    IR TRANSVEN INTRAHEPATIC PORTOSYST REV  03/30/2022    KNEE SURGERY Left     KNEE SURGERY Right     RELEASE CARPAL TUNNEL      RELEASE TRIGGER FINGER Right 06/11/2020    Procedure: RELEASE, TRIGGER FINGER, right ring and long finger;  Surgeon: Pascual Valencia MD;  Location: [facility] OR    SIGMOIDOSCOPY FLEXIBLE N/A 10/31/2017    Procedure: SIGMOIDOSCOPY FLEXIBLE;;  Surgeon: Armaan Adams MD;  Location:  GI    TIPS Procedure  06/06/2018    TIPS PROCEDURE  11/01/2020    ZZC PLASTY KNEE,MED OR LAT COMPARTMT Right 02/19/2021    Procedure: RIGHT UNICOMPARTMENTAL ARTHROPLASTY KNEE MEDIAL;  Surgeon: José Miguel Landaverde MD;  Location: [facility];  Service: Orthopedics          Medications  Prior to Admission medications    Medication Sig Start Date End Date Taking? Authorizing Provider   acetaminophen (TYLENOL) 500 MG tablet Take 1,000 mg by mouth every 6 hours as needed for mild pain    Yes Reported, Patient   Ascorbic Acid (VITAMIN C PO) Take 500 mg by mouth daily   Yes Reported, Patient   docusate  sodium (COLACE) 100 MG tablet Take 1 tablet (100 mg) by mouth daily 5/10/23  Yes Dylan Galaviz MD   eplerenone (INSPRA) 50 MG tablet Take 50 mg by mouth daily 2/22/24  Yes Reported, Patient   furosemide (LASIX) 20 MG tablet TAKE 1 TABLET BY MOUTH EVERY EVENING 12/26/23  Yes Gonzalo Wahl MD   furosemide (LASIX) 40 MG tablet Take 1 tablet (40 mg) by mouth every morning 10/3/22  Yes Gonzalo Wahl MD   lactulose (CHRONULAC) 10 GM/15ML solution Take 30 mLs (20 g) by mouth 3 times daily TAKE TO MAINTAIN 3 TO 4 BOWEL MOVEMENTS DAILY 9/22/23  Yes Tommy Foreman MD   MULTIPLE VITAMINS PO Take 1 tablet by mouth daily   Yes Reported, Patient   potassium chloride ER (KLOR-CON M) 20 MEQ CR tablet Take 2 tablets (40 mEq) by mouth daily 10/12/23  Yes Celestino Verduzco PA-C   sertraline (ZOLOFT) 25 MG tablet Take 1 tablet (25 mg) by mouth daily 4/3/23  Yes Gonzalo Wahl MD   sildenafil (REVATIO) 20 MG tablet Take 3-5 tabs 1/2-4 hours to relations 6/7/22  Yes Abena Acuña MD   vitamin D3 (CHOLECALCIFEROL) 2000 units (50 mcg) tablet Take 1 tablet by mouth daily   Yes Unknown, Entered By History       Allergies  Allergies   Allergen Reactions    Trazodone Visual Disturbance    Oxycodone Other (See Comments)     Delirium and constipation  Delirium and constipation       Review of systems  A 10-point review of systems was negative    Examination  BP (!) 174/83   Pulse 79   Temp 97.4  F (36.3  C) (Oral)   Wt 83 kg (183 lb)   SpO2 98%   BMI 25.52 kg/m    Body mass index is 25.52 kg/m .    Gen- well, NAD, A+Ox3, normal color  CVS- RRR  RS- CTA  Abd- SNT, no ascites or organomegaly on palpation or percussion, BS+  Extr- hands normal, no BESSY  Skin- no rash or jaundice  Neuro- no asterixis  Psych- normal mood    Laboratory  Lab Results   Component Value Date     04/03/2024     06/23/2021    POTASSIUM 4.2 04/03/2024    POTASSIUM 3.9 08/22/2022    POTASSIUM 4.0 06/23/2021     CHLORIDE 111 04/03/2024    CHLORIDE 116 08/22/2022    CHLORIDE 117 06/23/2021    CO2 23 04/03/2024    CO2 23 08/22/2022    CO2 25 06/23/2021    BUN 14.0 04/03/2024    BUN 12 08/22/2022    BUN 8 06/23/2021    CR 1.35 04/03/2024    CR 1.09 06/23/2021       Lab Results   Component Value Date    BILITOTAL 2.9 04/03/2024    BILITOTAL 3.6 06/23/2021    ALT <5 04/03/2024    ALT 19 06/23/2021    AST 35 04/03/2024    AST 33 06/23/2021    ALKPHOS 124 04/03/2024    ALKPHOS 165 06/23/2021       Lab Results   Component Value Date    ALBUMIN 2.9 04/03/2024    ALBUMIN 2.2 08/22/2022    ALBUMIN 2.7 06/23/2021    PROTTOTAL 5.7 04/03/2024    PROTTOTAL 5.6 06/23/2021        Lab Results   Component Value Date    WBC 4.8 04/03/2024    WBC 4.3 06/23/2021    HGB 12.1 04/03/2024    HGB 12.1 06/23/2021    MCV 92 04/03/2024    MCV 99 06/23/2021    PLT 71 04/03/2024    PLT 58 06/23/2021       Lab Results   Component Value Date    INR 1.69 04/03/2024    INR 1.79 06/23/2021       Radiology  Liver TIPS doppler U/S 4/3/2024 reviewed  Colonoscopy 3/31/2016 reviewed- 15mm TA in cecum    Assessment  58 year old male who presents for follow-up of decompensated alcohol-related cirrhosis complicated with ascites and hepatic encephalopathy.  MELD 3.0 = 20 (stable).  Ascites is well-controlled with TIPS and diuretics.  Hepatic encephalopathy is well-controlled on medical therapy.  Up-to-date with HCC screening.  Overdue for colorectal cancer screening.    Plan  Low Na diet  Continue diuretics at current doses  Continue lactulose and rifaximin  Colonoscopy (OK for ASC)   Follow-up in 6 months    Gonzalo Bales MD  Hepatology  [facility]    I spent 30 minutes on the date of the encounter doing chart review, history and exam, documentation and further activities as noted above.

## 2024-04-03 NOTE — LETTER
4/3/2024         RE: Ivan Bell  [id number] [facility] [address]        Dear Colleague,    Thank you for referring your patient, Ivan Bell, to the [facility] [place]. Please see a copy of my visit note below.    [facility] Hepatology    Follow-up Visit    Follow-up visit for cirrhosis    Subjective:  58 year old male    Cirrhosis  - ETOH  - hx ascites, TIPS placement 5/14/18, 6/6/18; TIPS revision 10/29/19, 11/25/2020, 8/11/2021, 1/28/2022  - hx HE  - hx variceal bleed Oct 2017  - ETOH hepatitis March 2019  - last EGD Jun 2019- diminutive EV, mild portal hypertensive gastropathy  - HCC screening- liver TIPS doppler U/S 4/3/2024     Last visit October 2023.  No new medications, ER visits or hospital admissions since last visit.    Patient is well today.  He has no symptoms related to liver disease.  Patient is making progress with his mobility following his left TKA last year.    Patient denies jaundice, lower extremity edema, abdominal distension, lethargy or confusion.  Bowels are moving 2-3 times a day.    Patient denies melena, hematemesis or hematochezia.    Patient denies fevers, sweats or chills.  He has not had his annual influenza and COVID-19 vaccinations this winter.    Weight stable.  Appetite is normal.    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      Medical hx Surgical hx   Past Medical History:   Diagnosis Date     Alcoholic cirrhosis of liver (H)      Alcoholic hepatitis (H28) 03/2019     Chronic kidney disease     CRF     Depression      Gout      History of transfusion      Hypertension      Hypertriglyceridemia      Left calcaneus fracture 01/09/2006 January 16, 2006: Fell 10 feet from ladder onto left foot on frozen ground on 1/9/06 at home.  Immediate pain and unable to walk- seen at [place] and diagnosed  with calcaneus fracture     Nephrolithiasis      Osteoarthritis      Portal vein thrombosis     left occlusion, partial main     Thrombocytopenia (H24)       Past Surgical History:   Procedure Laterality Date     ANKLE SURGERY Left      ANKLE SURGERY       ARTHROPLASTY KNEE Left 9/19/2023    Procedure: LEFT TOTAL KNEE ARTHROPLASTY;  Surgeon: José Miguel Landaverde MD;  Location: [facility] OR     ARTHROSCOPY KNEE       COLONOSCOPY N/A 03/31/2016    Procedure: COLONOSCOPY;  Surgeon: Rhys Uriostegui MD;  Location: [facility] GI     COMBINED CYSTOSCOPY, RETROGRADES, URETEROSCOPY, LASER HOLMIUM LITHOTRIPSY URETER(S), INSERT STENT Bilateral 7/1/2022    Procedure: CYSTOSCOPY, RIGHT RETROGRADE PYELOGRAM, RIGHT URETEROSCOPY WITH LASER LITHOTRIPSY AND BASKET REMOVAL OF STONE, RIGHT URETERAL STENT PLACEMENT, LEFT RETROGRADE PYELOGRAM, LEFT URETEROSCOPY WITH LASER LITHOTRIPSY AND BASKET REMOVAL OF STONE, LEFT URETERAL STENT PLACEMENT;  Surgeon: Dylan Galaviz MD;  Location: [facility] OR     COMBINED CYSTOSCOPY, RETROGRADES, URETEROSCOPY, LASER HOLMIUM LITHOTRIPSY URETER(S), INSERT STENT Bilateral 7/27/2022    Procedure: CYSTOSCOPY, BILATERAL URETERAL STENT REMOVAL, BILATERAL  RETROGRADE PYELOGRAM, BILATERAL URETEROSCOPY. HOLMIUM LASER LITHOTRIPSY LEFT SIDE.  AND BASKET REMOVAL OF STONES BILATERAL, LEFT  URETERAL STENT PLACEMENT;  Surgeon: Dylan Galaviz MD;  Location: [facility] OR     COMBINED CYSTOSCOPY, RETROGRADES, URETEROSCOPY, LASER HOLMIUM LITHOTRIPSY URETER(S), INSERT STENT Left 4/24/2023    Procedure: CYSTOSCOPY, LEFT RETROGRADE PYELOGRAM, LEFT URETEROSCOPY WITH LASER LITHOTRIOPSY AND BASKET REMOVAL OF STONES, LEFT URETERAL STENT PLACEMENT;  Surgeon: Dylan Galaviz MD;  Location: [facility] OR     COMBINED CYSTOSCOPY, RETROGRADES, URETEROSCOPY, LASER HOLMIUM LITHOTRIPSY URETER(S), INSERT STENT Left 5/10/2023    Procedure: CYSTOSCOPY, LEFT URETERAL STENT REMOVAL, LEFT RETROGRADE PYELOGRAM, LEFT URETEROSCOPY WITH LASER LITHOTRIOPSY  AND BASKET REMOVAL OF STONES, LEFT URETERAL STENT PLACEMENT;  Surgeon: Dylan Galaviz MD;  Location:  OR     ESOPHAGOSCOPY, GASTROSCOPY, DUODENOSCOPY (EGD), COMBINED N/A 03/31/2016    Procedure: COMBINED ESOPHAGOSCOPY, GASTROSCOPY, DUODENOSCOPY (EGD);  Surgeon: Rhys Uriostegui MD;  Location:  GI     ESOPHAGOSCOPY, GASTROSCOPY, DUODENOSCOPY (EGD), COMBINED N/A 03/09/2018    Procedure: COMBINED ESOPHAGOSCOPY, GASTROSCOPY, DUODENOSCOPY (EGD), BIOPSY SINGLE OR MULTIPLE;  EGD;  Surgeon: Gonzalo Wahl MD;  Location:  GI     ESOPHAGOSCOPY, GASTROSCOPY, DUODENOSCOPY (EGD), COMBINED N/A 06/07/2019    Procedure: ESOPHAGOGASTRODUODENOSCOPY (EGD);  Surgeon: Gonzalo Wahl MD;  Location:  GI     FOOT SURGERY       IR PARACENTESIS  10/29/2019     IR PARACENTESIS  11/25/2020     IR PARACENTESIS  08/11/2021     IR PARACENTESIS  01/28/2022     IR PARACENTESIS  03/30/2022     IR PARACENTESIS  7/23/2021     IR PARACENTESIS  8/5/2021     IR PARACENTESIS  4/27/2022     IR TRANSVEN INTRAHEPATIC PORTOSYST REV  10/29/2019     IR TRANSVEN INTRAHEPATIC PORTOSYST REV  11/25/2020     IR TRANSVEN INTRAHEPATIC PORTOSYST REV  08/11/2021     IR TRANSVEN INTRAHEPATIC PORTOSYST REV  01/28/2022     IR TRANSVEN INTRAHEPATIC PORTOSYST REV  03/30/2022     KNEE SURGERY Left      KNEE SURGERY Right      RELEASE CARPAL TUNNEL       RELEASE TRIGGER FINGER Right 06/11/2020    Procedure: RELEASE, TRIGGER FINGER, right ring and long finger;  Surgeon: Pascual Valencia MD;  Location: [facility] OR     SIGMOIDOSCOPY FLEXIBLE N/A 10/31/2017    Procedure: SIGMOIDOSCOPY FLEXIBLE;;  Surgeon: Armaan Adams MD;  Location:  GI     TIPS Procedure  06/06/2018     TIPS PROCEDURE  11/01/2020     ZZC PLASTY KNEE,MED OR LAT COMPARTMT Right 02/19/2021    Procedure: RIGHT UNICOMPARTMENTAL ARTHROPLASTY KNEE MEDIAL;  Surgeon: José Miguel Landaverde MD;  Location: [facility];  Service: Orthopedics          Medications  Prior to  Admission medications    Medication Sig Start Date End Date Taking? Authorizing Provider   acetaminophen (TYLENOL) 500 MG tablet Take 1,000 mg by mouth every 6 hours as needed for mild pain    Yes Reported, Patient   Ascorbic Acid (VITAMIN C PO) Take 500 mg by mouth daily   Yes Reported, Patient   docusate sodium (COLACE) 100 MG tablet Take 1 tablet (100 mg) by mouth daily 5/10/23  Yes Dylan Galaviz MD   eplerenone (INSPRA) 50 MG tablet Take 50 mg by mouth daily 2/22/24  Yes Reported, Patient   furosemide (LASIX) 20 MG tablet TAKE 1 TABLET BY MOUTH EVERY EVENING 12/26/23  Yes Gonzalo Wahl MD   furosemide (LASIX) 40 MG tablet Take 1 tablet (40 mg) by mouth every morning 10/3/22  Yes Gonzalo Wahl MD   lactulose (CHRONULAC) 10 GM/15ML solution Take 30 mLs (20 g) by mouth 3 times daily TAKE TO MAINTAIN 3 TO 4 BOWEL MOVEMENTS DAILY 9/22/23  Yes Tommy Foreman MD   MULTIPLE VITAMINS PO Take 1 tablet by mouth daily   Yes Reported, Patient   potassium chloride ER (KLOR-CON M) 20 MEQ CR tablet Take 2 tablets (40 mEq) by mouth daily 10/12/23  Yes Celestino Verduzco PA-C   sertraline (ZOLOFT) 25 MG tablet Take 1 tablet (25 mg) by mouth daily 4/3/23  Yes Gonzalo Wahl MD   sildenafil (REVATIO) 20 MG tablet Take 3-5 tabs 1/2-4 hours to relations 6/7/22  Yes Abena Acuña MD   vitamin D3 (CHOLECALCIFEROL) 2000 units (50 mcg) tablet Take 1 tablet by mouth daily   Yes Unknown, Entered By History       Allergies  Allergies   Allergen Reactions     Trazodone Visual Disturbance     Oxycodone Other (See Comments)     Delirium and constipation  Delirium and constipation       Review of systems  A 10-point review of systems was negative    Examination  BP (!) 174/83   Pulse 79   Temp 97.4  F (36.3  C) (Oral)   Wt 83 kg (183 lb)   SpO2 98%   BMI 25.52 kg/m    Body mass index is 25.52 kg/m .    Gen- well, NAD, A+Ox3, normal color  CVS- RRR  RS- CTA  Abd- SNT, no ascites or  organomegaly on palpation or percussion, BS+  Extr- hands normal, no BESSY  Skin- no rash or jaundice  Neuro- no asterixis  Psych- normal mood    Laboratory  Lab Results   Component Value Date     04/03/2024     06/23/2021    POTASSIUM 4.2 04/03/2024    POTASSIUM 3.9 08/22/2022    POTASSIUM 4.0 06/23/2021    CHLORIDE 111 04/03/2024    CHLORIDE 116 08/22/2022    CHLORIDE 117 06/23/2021    CO2 23 04/03/2024    CO2 23 08/22/2022    CO2 25 06/23/2021    BUN 14.0 04/03/2024    BUN 12 08/22/2022    BUN 8 06/23/2021    CR 1.35 04/03/2024    CR 1.09 06/23/2021       Lab Results   Component Value Date    BILITOTAL 2.9 04/03/2024    BILITOTAL 3.6 06/23/2021    ALT <5 04/03/2024    ALT 19 06/23/2021    AST 35 04/03/2024    AST 33 06/23/2021    ALKPHOS 124 04/03/2024    ALKPHOS 165 06/23/2021       Lab Results   Component Value Date    ALBUMIN 2.9 04/03/2024    ALBUMIN 2.2 08/22/2022    ALBUMIN 2.7 06/23/2021    PROTTOTAL 5.7 04/03/2024    PROTTOTAL 5.6 06/23/2021        Lab Results   Component Value Date    WBC 4.8 04/03/2024    WBC 4.3 06/23/2021    HGB 12.1 04/03/2024    HGB 12.1 06/23/2021    MCV 92 04/03/2024    MCV 99 06/23/2021    PLT 71 04/03/2024    PLT 58 06/23/2021       Lab Results   Component Value Date    INR 1.69 04/03/2024    INR 1.79 06/23/2021       Radiology  Liver TIPS doppler U/S 4/3/2024 reviewed  Colonoscopy 3/31/2016 reviewed- 15mm TA in cecum    Assessment  58 year old male who presents for follow-up of decompensated alcohol-related cirrhosis complicated with ascites and hepatic encephalopathy.  MELD 3.0 = 20 (stable).  Ascites is well-controlled with TIPS and diuretics.  Hepatic encephalopathy is well-controlled on medical therapy.  Up-to-date with HCC screening.  Overdue for colorectal cancer screening.    Plan  Low Na diet  Continue diuretics at current doses  Continue lactulose and rifaximin  Colonoscopy (OK for ASC)   Follow-up in 6 months    Gonzalo Bales MD  Hepatology  [facility]  [place]    I spent 30 minutes on the date of the encounter doing chart review, history and exam, documentation and further activities as noted above.      Again, thank you for allowing me to participate in the care of your patient.        Sincerely,        Gonzalo Bales MD

## 2024-04-03 NOTE — NURSING NOTE
Chief Complaint   Patient presents with    RECHECK     6 mo f/u       BP (!) 174/83   Pulse 79   Temp 97.4  F (36.3  C) (Oral)   Wt 83 kg (183 lb)   SpO2 98%   BMI 25.52 kg/m      Sabino Wang on 4/3/2024 at 8:45 AM

## 2024-04-22 NOTE — PROGRESS NOTES
Patient called to review upcoming appointments. Patient scheduled 4/3/24 beginning at 0730 for lab work, ultrasound, and office visit with Dr. Bales. Reviewed ultrasound instructions. Patient states he is feeling well, no abdominal distention or lower extremity swelling. He reports fatigue but unchanged. Patient has no questions or concerns at this time.

## 2024-05-17 DIAGNOSIS — N25.89 RENAL TUBULAR ACIDOSIS: ICD-10-CM

## 2024-05-22 RX ORDER — SODIUM BICARBONATE 650 MG/1
650 TABLET ORAL 2 TIMES DAILY
Qty: 180 TABLET | Refills: 0 | Status: SHIPPED | OUTPATIENT
Start: 2024-05-22

## 2024-05-22 NOTE — TELEPHONE ENCOUNTER
LVD:  6/20/2023  [facility] [place]     Alma Moses MD  Nephrology Recurrent kidney stones     Refilled per protocol.

## 2024-05-29 ENCOUNTER — TRANSFERRED RECORDS (OUTPATIENT)
Dept: HEALTH INFORMATION MANAGEMENT | Facility: CLINIC | Age: 59
End: 2024-05-29
Payer: COMMERCIAL

## 2024-06-06 NOTE — PROGRESS NOTES
I called pt bc she no showed but call wouldn't go through.   Patient's wife Elicia reports that after taking patient off spironolactone a few weeks ago due to side effects of breast tenderness, he has slowly accumulated fluid. Weight is up 10 lbs and feet/ankles have some swelling along with abdominal distention. She had patient resume his spironolactone 50 mg daily. Patient will have labs done locally in about 2 weeks, just prior to his virtual visit with Dr. Bales on 5/4. She states that otherwise patient is doing well, no further complaints. Spironolactone added back to med list.  Per Dr. Bales, if breast tenderness returns, patient can try lower dose of spironolactone to 25 mg. This was left in a voicemail with patient.

## 2024-06-12 ENCOUNTER — TRANSFERRED RECORDS (OUTPATIENT)
Dept: HEALTH INFORMATION MANAGEMENT | Facility: CLINIC | Age: 59
End: 2024-06-12

## 2024-06-15 ENCOUNTER — HEALTH MAINTENANCE LETTER (OUTPATIENT)
Age: 59
End: 2024-06-15

## 2024-07-03 ENCOUNTER — PATIENT OUTREACH (OUTPATIENT)
Dept: GASTROENTEROLOGY | Facility: CLINIC | Age: 59
End: 2024-07-03
Payer: COMMERCIAL

## 2024-07-03 NOTE — PROGRESS NOTES
Per Dr. Bales, pt's cirrhosis is stable with current regimen and pt does not require writer's assistance. Will end hepatology care coordination at this time.

## 2024-07-15 ENCOUNTER — TRANSFERRED RECORDS (OUTPATIENT)
Dept: HEALTH INFORMATION MANAGEMENT | Facility: CLINIC | Age: 59
End: 2024-07-15
Payer: COMMERCIAL

## 2024-08-07 ENCOUNTER — TRANSFERRED RECORDS (OUTPATIENT)
Dept: HEALTH INFORMATION MANAGEMENT | Facility: CLINIC | Age: 59
End: 2024-08-07
Payer: COMMERCIAL

## 2024-08-15 ENCOUNTER — PATIENT OUTREACH (OUTPATIENT)
Dept: CARE COORDINATION | Facility: CLINIC | Age: 59
End: 2024-08-15
Payer: COMMERCIAL

## 2024-10-03 ENCOUNTER — DOCUMENTATION ONLY (OUTPATIENT)
Dept: GASTROENTEROLOGY | Facility: CLINIC | Age: 59
End: 2024-10-03
Payer: COMMERCIAL

## 2024-10-03 DIAGNOSIS — K70.30 ALCOHOLIC CIRRHOSIS OF LIVER WITHOUT ASCITES (H): Primary | ICD-10-CM

## 2024-10-09 ENCOUNTER — ANCILLARY PROCEDURE (OUTPATIENT)
Dept: ULTRASOUND IMAGING | Facility: CLINIC | Age: 59
End: 2024-10-09
Attending: INTERNAL MEDICINE
Payer: COMMERCIAL

## 2024-10-09 ENCOUNTER — LAB (OUTPATIENT)
Dept: LAB | Facility: CLINIC | Age: 59
End: 2024-10-09
Payer: COMMERCIAL

## 2024-10-09 ENCOUNTER — OFFICE VISIT (OUTPATIENT)
Dept: GASTROENTEROLOGY | Facility: CLINIC | Age: 59
End: 2024-10-09
Attending: INTERNAL MEDICINE
Payer: COMMERCIAL

## 2024-10-09 VITALS
SYSTOLIC BLOOD PRESSURE: 171 MMHG | DIASTOLIC BLOOD PRESSURE: 90 MMHG | TEMPERATURE: 97.8 F | WEIGHT: 177.9 LBS | HEART RATE: 63 BPM | OXYGEN SATURATION: 100 % | BODY MASS INDEX: 24.81 KG/M2

## 2024-10-09 DIAGNOSIS — D36.9 TUBULAR ADENOMA: Primary | ICD-10-CM

## 2024-10-09 DIAGNOSIS — K70.30 ALCOHOLIC CIRRHOSIS OF LIVER WITHOUT ASCITES (H): ICD-10-CM

## 2024-10-09 DIAGNOSIS — N20.1 LEFT URETERAL STONE: ICD-10-CM

## 2024-10-09 LAB
ALBUMIN SERPL BCG-MCNC: 2.8 G/DL (ref 3.5–5.2)
ALP SERPL-CCNC: 140 U/L (ref 40–150)
ALT SERPL W P-5'-P-CCNC: 12 U/L (ref 0–70)
ANION GAP SERPL CALCULATED.3IONS-SCNC: 6 MMOL/L (ref 7–15)
AST SERPL W P-5'-P-CCNC: 37 U/L (ref 0–45)
BILIRUB DIRECT SERPL-MCNC: 1.16 MG/DL (ref 0–0.3)
BILIRUB SERPL-MCNC: 3.4 MG/DL
BUN SERPL-MCNC: 10.3 MG/DL (ref 8–23)
CALCIUM SERPL-MCNC: 8.7 MG/DL (ref 8.8–10.4)
CHLORIDE SERPL-SCNC: 114 MMOL/L (ref 98–107)
CREAT SERPL-MCNC: 1.2 MG/DL (ref 0.67–1.17)
EGFRCR SERPLBLD CKD-EPI 2021: 69 ML/MIN/1.73M2
ERYTHROCYTE [DISTWIDTH] IN BLOOD BY AUTOMATED COUNT: 16.4 % (ref 10–15)
GLUCOSE SERPL-MCNC: 141 MG/DL (ref 70–99)
HCO3 SERPL-SCNC: 22 MMOL/L (ref 22–29)
HCT VFR BLD AUTO: 36.1 % (ref 40–53)
HGB BLD-MCNC: 12.2 G/DL (ref 13.3–17.7)
INR PPP: 1.81 (ref 0.85–1.15)
MCH RBC QN AUTO: 31.9 PG (ref 26.5–33)
MCHC RBC AUTO-ENTMCNC: 33.8 G/DL (ref 31.5–36.5)
MCV RBC AUTO: 95 FL (ref 78–100)
PHOSPHATE SERPL-MCNC: 2.6 MG/DL (ref 2.5–4.5)
PLATELET # BLD AUTO: 57 10E3/UL (ref 150–450)
POTASSIUM SERPL-SCNC: 3.4 MMOL/L (ref 3.4–5.3)
PROT SERPL-MCNC: 5.1 G/DL (ref 6.4–8.3)
RBC # BLD AUTO: 3.82 10E6/UL (ref 4.4–5.9)
SODIUM SERPL-SCNC: 142 MMOL/L (ref 135–145)
WBC # BLD AUTO: 3.6 10E3/UL (ref 4–11)

## 2024-10-09 PROCEDURE — 80053 COMPREHEN METABOLIC PANEL: CPT | Performed by: PATHOLOGY

## 2024-10-09 PROCEDURE — 82248 BILIRUBIN DIRECT: CPT | Performed by: PATHOLOGY

## 2024-10-09 PROCEDURE — 99213 OFFICE O/P EST LOW 20 MIN: CPT | Performed by: INTERNAL MEDICINE

## 2024-10-09 PROCEDURE — 99214 OFFICE O/P EST MOD 30 MIN: CPT | Mod: GC | Performed by: INTERNAL MEDICINE

## 2024-10-09 PROCEDURE — 84100 ASSAY OF PHOSPHORUS: CPT | Performed by: PATHOLOGY

## 2024-10-09 PROCEDURE — 93975 VASCULAR STUDY: CPT | Performed by: RADIOLOGY

## 2024-10-09 PROCEDURE — 36415 COLL VENOUS BLD VENIPUNCTURE: CPT | Performed by: PATHOLOGY

## 2024-10-09 PROCEDURE — 76700 US EXAM ABDOM COMPLETE: CPT | Mod: XU | Performed by: RADIOLOGY

## 2024-10-09 PROCEDURE — 85027 COMPLETE CBC AUTOMATED: CPT | Performed by: PATHOLOGY

## 2024-10-09 PROCEDURE — 85610 PROTHROMBIN TIME: CPT | Performed by: PATHOLOGY

## 2024-10-09 ASSESSMENT — PAIN SCALES - GENERAL: PAINLEVEL: NO PAIN (0)

## 2024-10-09 NOTE — PROGRESS NOTES
"[facility]    Follow-up Visit    Follow-up visit for cirrhosis    Subjective:  59 year old male    Cirrhosis  - ETOH  - hx ascites, TIPS placement 5/14/18, 6/6/18; TIPS revision 10/29/19, 11/25/2020, 8/11/2021, 1/28/2022  - hx HE  - hx variceal bleed Oct 2017  - ETOH hepatitis March 2019  - last EGD Jun 2019- diminutive EV, mild portal hypertensive gastropathy  - HCC screening- liver TIPS doppler U/S 10/9/24    Last visit April 2024.  No new medications, ER visits or hospital admissions since last visit.    Patient is well today.  He has no symptoms related to liver disease. Last night woke up with a pain in his LUQ under his rib cage. Sat up, and pain got worse. Denies any radiation or associated nausea. Never had pain before. Went away spontaneously after 10 min. During abd US this morning, pain reproducible over LUQ, but now resolved. Still having regular Bms 2-3x a day. Wonders if he \"tweaked\" something at work.     Patient denies jaundice, lower extremity edema, abdominal distension, lethargy or confusion.  Bowels are moving 2-3 times a day on lactulose.     Patient denies melena, hematemesis or hematochezia.    Patient denies fevers, sweats or chills.  He has not had his annual influenza and COVID-19 vaccinations this fall. Last COVID booster was 2 years ago.     Lost 6 lbs since last visit. Appetite is normal.    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    7 years overdue for colon cancer screening. Last colonoscopy in 2016 w/ one 15 mm polyp.     Medical hx Surgical hx   Past Medical History:   Diagnosis Date    Alcoholic cirrhosis of liver (H)     Alcoholic hepatitis (H) 03/2019    Chronic kidney disease     CRF    Depression     Gout     History of transfusion     Hypertension     Hypertriglyceridemia     Left calcaneus fracture 01/09/2006 January 16, 2006: Fell 10 feet from ladder onto left foot on frozen ground on 1/9/06 " at home.  Immediate pain and unable to walk- seen at [place] and diagnosed with calcaneus fracture    Nephrolithiasis     Osteoarthritis     Portal vein thrombosis     left occlusion, partial main    Thrombocytopenia (H)       Past Surgical History:   Procedure Laterality Date    ANKLE SURGERY Left     ANKLE SURGERY      ARTHROPLASTY KNEE Left 9/19/2023    Procedure: LEFT TOTAL KNEE ARTHROPLASTY;  Surgeon: José Miguel Landaverde MD;  Location: [facility] Main OR    ARTHROSCOPY KNEE      COLONOSCOPY N/A 03/31/2016    Procedure: COLONOSCOPY;  Surgeon: Rhys Uriostegui MD;  Location: [facility] GI    COMBINED CYSTOSCOPY, RETROGRADES, URETEROSCOPY, LASER HOLMIUM LITHOTRIPSY URETER(S), INSERT STENT Bilateral 7/1/2022    Procedure: CYSTOSCOPY, RIGHT RETROGRADE PYELOGRAM, RIGHT URETEROSCOPY WITH LASER LITHOTRIPSY AND BASKET REMOVAL OF STONE, RIGHT URETERAL STENT PLACEMENT, LEFT RETROGRADE PYELOGRAM, LEFT URETEROSCOPY WITH LASER LITHOTRIPSY AND BASKET REMOVAL OF STONE, LEFT URETERAL STENT PLACEMENT;  Surgeon: Dylan Galaviz MD;  Location:  OR    COMBINED CYSTOSCOPY, RETROGRADES, URETEROSCOPY, LASER HOLMIUM LITHOTRIPSY URETER(S), INSERT STENT Bilateral 7/27/2022    Procedure: CYSTOSCOPY, BILATERAL URETERAL STENT REMOVAL, BILATERAL  RETROGRADE PYELOGRAM, BILATERAL URETEROSCOPY. HOLMIUM LASER LITHOTRIPSY LEFT SIDE.  AND BASKET REMOVAL OF STONES BILATERAL, LEFT  URETERAL STENT PLACEMENT;  Surgeon: Dylan Galaviz MD;  Location: [facility] OR    COMBINED CYSTOSCOPY, RETROGRADES, URETEROSCOPY, LASER HOLMIUM LITHOTRIPSY URETER(S), INSERT STENT Left 4/24/2023    Procedure: CYSTOSCOPY, LEFT RETROGRADE PYELOGRAM, LEFT URETEROSCOPY WITH LASER LITHOTRIOPSY AND BASKET REMOVAL OF STONES, LEFT URETERAL STENT PLACEMENT;  Surgeon: Dylan Galaviz MD;  Location: [facility] OR    COMBINED CYSTOSCOPY, RETROGRADES, URETEROSCOPY, LASER HOLMIUM LITHOTRIPSY URETER(S), INSERT STENT Left 5/10/2023    Procedure: CYSTOSCOPY, LEFT URETERAL STENT REMOVAL, LEFT  RETROGRADE PYELOGRAM, LEFT URETEROSCOPY WITH LASER LITHOTRIOPSY AND BASKET REMOVAL OF STONES, LEFT URETERAL STENT PLACEMENT;  Surgeon: Dylan Galaviz MD;  Location:  OR    ESOPHAGOSCOPY, GASTROSCOPY, DUODENOSCOPY (EGD), COMBINED N/A 03/31/2016    Procedure: COMBINED ESOPHAGOSCOPY, GASTROSCOPY, DUODENOSCOPY (EGD);  Surgeon: Rhys Uriostegui MD;  Location:  GI    ESOPHAGOSCOPY, GASTROSCOPY, DUODENOSCOPY (EGD), COMBINED N/A 03/09/2018    Procedure: COMBINED ESOPHAGOSCOPY, GASTROSCOPY, DUODENOSCOPY (EGD), BIOPSY SINGLE OR MULTIPLE;  EGD;  Surgeon: Gonzalo Wahl MD;  Location:  GI    ESOPHAGOSCOPY, GASTROSCOPY, DUODENOSCOPY (EGD), COMBINED N/A 06/07/2019    Procedure: ESOPHAGOGASTRODUODENOSCOPY (EGD);  Surgeon: Gonzalo Wahl MD;  Location:  GI    FOOT SURGERY      IR PARACENTESIS  10/29/2019    IR PARACENTESIS  11/25/2020    IR PARACENTESIS  08/11/2021    IR PARACENTESIS  01/28/2022    IR PARACENTESIS  03/30/2022    IR PARACENTESIS  7/23/2021    IR PARACENTESIS  8/5/2021    IR PARACENTESIS  4/27/2022    IR TRANSVEN INTRAHEPATIC PORTOSYST REV  10/29/2019    IR TRANSVEN INTRAHEPATIC PORTOSYST REV  11/25/2020    IR TRANSVEN INTRAHEPATIC PORTOSYST REV  08/11/2021    IR TRANSVEN INTRAHEPATIC PORTOSYST REV  01/28/2022    IR TRANSVEN INTRAHEPATIC PORTOSYST REV  03/30/2022    KNEE SURGERY Left     KNEE SURGERY Right     RELEASE CARPAL TUNNEL      RELEASE TRIGGER FINGER Right 06/11/2020    Procedure: RELEASE, TRIGGER FINGER, right ring and long finger;  Surgeon: Pascual Valencia MD;  Location: [facility] OR    SIGMOIDOSCOPY FLEXIBLE N/A 10/31/2017    Procedure: SIGMOIDOSCOPY FLEXIBLE;;  Surgeon: Armaan Adams MD;  Location:  GI    TIPS Procedure  06/06/2018    TIPS PROCEDURE  11/01/2020    ZZC PLASTY KNEE,MED OR LAT COMPARTMT Right 02/19/2021    Procedure: RIGHT UNICOMPARTMENTAL ARTHROPLASTY KNEE MEDIAL;  Surgeon: José Miguel Landaverde MD;  Location: [facility];  Service: Orthopedics           Medications  Prior to Admission medications    Medication Sig Start Date End Date Taking? Authorizing Provider   acetaminophen (TYLENOL) 500 MG tablet Take 1,000 mg by mouth every 6 hours as needed for mild pain    Yes Reported, Patient   Ascorbic Acid (VITAMIN C PO) Take 500 mg by mouth daily   Yes Reported, Patient   docusate sodium (COLACE) 100 MG tablet Take 1 tablet (100 mg) by mouth daily 5/10/23  Yes Dylan Galaviz MD   eplerenone (INSPRA) 50 MG tablet Take 50 mg by mouth daily 2/22/24  Yes Reported, Patient   furosemide (LASIX) 20 MG tablet TAKE 1 TABLET BY MOUTH EVERY EVENING 12/26/23  Yes Gonzalo Wahl MD   furosemide (LASIX) 40 MG tablet Take 1 tablet (40 mg) by mouth every morning 10/3/22  Yes Gonzalo Wahl MD   lactulose (CHRONULAC) 10 GM/15ML solution Take 30 mLs (20 g) by mouth 3 times daily TAKE TO MAINTAIN 3 TO 4 BOWEL MOVEMENTS DAILY 9/22/23  Yes Tommy Foreman MD   MULTIPLE VITAMINS PO Take 1 tablet by mouth daily   Yes Reported, Patient   potassium chloride ER (KLOR-CON M) 20 MEQ CR tablet Take 2 tablets (40 mEq) by mouth daily 10/12/23  Yes Celestino Verduzco PA-C   sertraline (ZOLOFT) 25 MG tablet Take 1 tablet (25 mg) by mouth daily 4/3/23  Yes Gonzalo Wahl MD   sildenafil (REVATIO) 20 MG tablet Take 3-5 tabs 1/2-4 hours to relations 6/7/22  Yes Abena Acuña MD   vitamin D3 (CHOLECALCIFEROL) 2000 units (50 mcg) tablet Take 1 tablet by mouth daily   Yes Unknown, Entered By History       Allergies  Allergies   Allergen Reactions    Trazodone Visual Disturbance    Oxycodone Other (See Comments)     Delirium and constipation  Delirium and constipation       Review of systems  A 10-point review of systems was negative    Examination  Gen- well, NAD, A+Ox3, normal color  CVS- RRR  RS- CTA  Abd- Soft, mild tenderness in LUQ just inferior to costal margin, no ascites or organomegaly on palpation or percussion, BS+  Extr- hands normal, no  BESSY  Skin- no rash or jaundice  Neuro- no asterixis  Psych- normal mood    Laboratory  Lab on 10/09/2024   Component Date Value Ref Range Status    WBC Count 10/09/2024 3.6 (L)  4.0 - 11.0 10e3/uL Final    RBC Count 10/09/2024 3.82 (L)  4.40 - 5.90 10e6/uL Final    Hemoglobin 10/09/2024 12.2 (L)  13.3 - 17.7 g/dL Final    Hematocrit 10/09/2024 36.1 (L)  40.0 - 53.0 % Final    MCV 10/09/2024 95  78 - 100 fL Final    MCH 10/09/2024 31.9  26.5 - 33.0 pg Final    MCHC 10/09/2024 33.8  31.5 - 36.5 g/dL Final    RDW 10/09/2024 16.4 (H)  10.0 - 15.0 % Final    Platelet Count 10/09/2024 57 (L)  150 - 450 10e3/uL Final    INR 10/09/2024 1.81 (H)  0.85 - 1.15 Final    Sodium 10/09/2024 142  135 - 145 mmol/L Final    Potassium 10/09/2024 3.4  3.4 - 5.3 mmol/L Final    Chloride 10/09/2024 114 (H)  98 - 107 mmol/L Final    Carbon Dioxide (CO2) 10/09/2024 22  22 - 29 mmol/L Final    Anion Gap 10/09/2024 6 (L)  7 - 15 mmol/L Final    Glucose 10/09/2024 141 (H)  70 - 99 mg/dL Final    Urea Nitrogen 10/09/2024 10.3  8.0 - 23.0 mg/dL Final    Creatinine 10/09/2024 1.20 (H)  0.67 - 1.17 mg/dL Final    GFR Estimate 10/09/2024 69  >60 mL/min/1.73m2 Final    eGFR calculated using 2021 CKD-EPI equation.    Calcium 10/09/2024 8.7 (L)  8.8 - 10.4 mg/dL Final    Reference intervals for this test were updated on 7/16/2024 to reflect our healthy population more accurately. There may be differences in the flagging of prior results with similar values performed with this method. Those prior results can be interpreted in the context of the updated reference intervals.    Albumin 10/09/2024 2.8 (L)  3.5 - 5.2 g/dL Final    Phosphorus 10/09/2024 2.6  2.5 - 4.5 mg/dL Final    Alkaline Phosphatase 10/09/2024 140  40 - 150 U/L Final    ALT 10/09/2024 12  0 - 70 U/L Final    AST 10/09/2024 37  0 - 45 U/L Final    Bilirubin Total 10/09/2024 3.4 (H)  <=1.2 mg/dL Final    Bilirubin Direct 10/09/2024 1.16 (H)  0.00 - 0.30 mg/dL Final    Protein Total  10/09/2024 5.1 (L)  6.4 - 8.3 g/dL Final     MELD 3.0: 20 at 10/9/2024  7:36 AM  MELD-Na: 19 at 10/9/2024  7:36 AM  Calculated from:  Serum Creatinine: 1.20 mg/dL at 10/9/2024  7:36 AM  Serum Sodium: 142 mmol/L (Using max of 137 mmol/L) at 10/9/2024  7:36 AM  Total Bilirubin: 3.4 mg/dL at 10/9/2024  7:36 AM  Serum Albumin: 2.8 g/dL at 10/9/2024  7:36 AM  INR(ratio): 1.81 at 10/9/2024  7:36 AM  Age at listing (hypothetical): 59 years  Sex: Male at 10/9/2024  7:36 AM    Radiology  Liver TIPS doppler U/S 10/9/24 reviewed  Colonoscopy 3/31/2016 reviewed- 15mm TA in cecum    Assessment  59 year old male who presents for follow-up of decompensated alcohol-related cirrhosis complicated with ascites and hepatic encephalopathy.  MELD 3.0 = 20 (stable). Mild indirect hyperbilirubinemia likely attributed to hemolysis from TIPS. Ascites is well-controlled with TIPS and diuretics.  Hepatic encephalopathy is well-controlled on medical therapy.  Up-to-date with HCC screening.  Overdue for colorectal cancer screening. Declined COVID/flu.     Plan  Monitor LUQ pain--seek care if pain returns and persists   Low Na diet  Continue diuretics at current doses  Continue lactulose  Colonoscopy ordered  Follow-up with ESTEFANIA in 6 months    Patient was seen and staffed with MD Elaine Royal,   Internal Medicine, PGY-3

## 2024-10-09 NOTE — LETTER
"10/9/2024      Ivan Bell  [id number] [address] [id number]      Dear Colleague,    Thank you for referring your patient, Ivan Bell, to the [facility] [place]. Please see a copy of my visit note below.    [facility]    Follow-up Visit    Follow-up visit for cirrhosis    Subjective:  59 year old male    Cirrhosis  - ETOH  - hx ascites, TIPS placement 5/14/18, 6/6/18; TIPS revision 10/29/19, 11/25/2020, 8/11/2021, 1/28/2022  - hx HE  - hx variceal bleed Oct 2017  - ETOH hepatitis March 2019  - last EGD Jun 2019- diminutive EV, mild portal hypertensive gastropathy  - HCC screening- liver TIPS doppler U/S 10/9/24    Last visit April 2024.  No new medications, ER visits or hospital admissions since last visit.    Patient is well today.  He has no symptoms related to liver disease. Last night woke up with a pain in his LUQ under his rib cage. Sat up, and pain got worse. Denies any radiation or associated nausea. Never had pain before. Went away spontaneously after 10 min. During abd US this morning, pain reproducible over LUQ, but now resolved. Still having regular Bms 2-3x a day. Wonders if he \"tweaked\" something at work.     Patient denies jaundice, lower extremity edema, abdominal distension, lethargy or confusion.  Bowels are moving 2-3 times a day on lactulose.     Patient denies melena, hematemesis or hematochezia.    Patient denies fevers, sweats or chills.  He has not had his annual influenza and COVID-19 vaccinations this fall. Last COVID booster was 2 years ago.     Lost 6 lbs since last visit. Appetite is normal.    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    7 years overdue for colon cancer screening. Last colonoscopy in 2016 w/ one 15 mm polyp.     Medical hx Surgical hx   Past Medical History:   Diagnosis Date     Alcoholic cirrhosis of liver (H)      Alcoholic hepatitis (H) 03/2019 "     Chronic kidney disease     CRF     Depression      Gout      History of transfusion      Hypertension      Hypertriglyceridemia      Left calcaneus fracture 01/09/2006 January 16, 2006: Fell 10 feet from ladder onto left foot on frozen ground on 1/9/06 at home.  Immediate pain and unable to walk- seen at [place] and diagnosed with calcaneus fracture     Nephrolithiasis      Osteoarthritis      Portal vein thrombosis     left occlusion, partial main     Thrombocytopenia (H)       Past Surgical History:   Procedure Laterality Date     ANKLE SURGERY Left      ANKLE SURGERY       ARTHROPLASTY KNEE Left 9/19/2023    Procedure: LEFT TOTAL KNEE ARTHROPLASTY;  Surgeon: José Miguel Landaverde MD;  Location: [facility] Main OR     ARTHROSCOPY KNEE       COLONOSCOPY N/A 03/31/2016    Procedure: COLONOSCOPY;  Surgeon: Rhys Uriostegui MD;  Location: [facility]     COMBINED CYSTOSCOPY, RETROGRADES, URETEROSCOPY, LASER HOLMIUM LITHOTRIPSY URETER(S), INSERT STENT Bilateral 7/1/2022    Procedure: CYSTOSCOPY, RIGHT RETROGRADE PYELOGRAM, RIGHT URETEROSCOPY WITH LASER LITHOTRIPSY AND BASKET REMOVAL OF STONE, RIGHT URETERAL STENT PLACEMENT, LEFT RETROGRADE PYELOGRAM, LEFT URETEROSCOPY WITH LASER LITHOTRIPSY AND BASKET REMOVAL OF STONE, LEFT URETERAL STENT PLACEMENT;  Surgeon: Dylan Galaviz MD;  Location:  OR     COMBINED CYSTOSCOPY, RETROGRADES, URETEROSCOPY, LASER HOLMIUM LITHOTRIPSY URETER(S), INSERT STENT Bilateral 7/27/2022    Procedure: CYSTOSCOPY, BILATERAL URETERAL STENT REMOVAL, BILATERAL  RETROGRADE PYELOGRAM, BILATERAL URETEROSCOPY. HOLMIUM LASER LITHOTRIPSY LEFT SIDE.  AND BASKET REMOVAL OF STONES BILATERAL, LEFT  URETERAL STENT PLACEMENT;  Surgeon: Dylan Galaviz MD;  Location:  OR     COMBINED CYSTOSCOPY, RETROGRADES, URETEROSCOPY, LASER HOLMIUM LITHOTRIPSY URETER(S), INSERT STENT Left 4/24/2023    Procedure: CYSTOSCOPY, LEFT RETROGRADE PYELOGRAM, LEFT URETEROSCOPY WITH LASER LITHOTRIOPSY AND BASKET  REMOVAL OF STONES, LEFT URETERAL STENT PLACEMENT;  Surgeon: Dylan Galaviz MD;  Location:  OR     COMBINED CYSTOSCOPY, RETROGRADES, URETEROSCOPY, LASER HOLMIUM LITHOTRIPSY URETER(S), INSERT STENT Left 5/10/2023    Procedure: CYSTOSCOPY, LEFT URETERAL STENT REMOVAL, LEFT RETROGRADE PYELOGRAM, LEFT URETEROSCOPY WITH LASER LITHOTRIOPSY AND BASKET REMOVAL OF STONES, LEFT URETERAL STENT PLACEMENT;  Surgeon: Dylan Galaviz MD;  Location:  OR     ESOPHAGOSCOPY, GASTROSCOPY, DUODENOSCOPY (EGD), COMBINED N/A 03/31/2016    Procedure: COMBINED ESOPHAGOSCOPY, GASTROSCOPY, DUODENOSCOPY (EGD);  Surgeon: Rhys Uriostegui MD;  Location:  GI     ESOPHAGOSCOPY, GASTROSCOPY, DUODENOSCOPY (EGD), COMBINED N/A 03/09/2018    Procedure: COMBINED ESOPHAGOSCOPY, GASTROSCOPY, DUODENOSCOPY (EGD), BIOPSY SINGLE OR MULTIPLE;  EGD;  Surgeon: Gonzalo Wahl MD;  Location:  GI     ESOPHAGOSCOPY, GASTROSCOPY, DUODENOSCOPY (EGD), COMBINED N/A 06/07/2019    Procedure: ESOPHAGOGASTRODUODENOSCOPY (EGD);  Surgeon: Gonzalo Wahl MD;  Location:  GI     FOOT SURGERY       IR PARACENTESIS  10/29/2019     IR PARACENTESIS  11/25/2020     IR PARACENTESIS  08/11/2021     IR PARACENTESIS  01/28/2022     IR PARACENTESIS  03/30/2022     IR PARACENTESIS  7/23/2021     IR PARACENTESIS  8/5/2021     IR PARACENTESIS  4/27/2022     IR TRANSVEN INTRAHEPATIC PORTOSYST REV  10/29/2019     IR TRANSVEN INTRAHEPATIC PORTOSYST REV  11/25/2020     IR TRANSVEN INTRAHEPATIC PORTOSYST REV  08/11/2021     IR TRANSVEN INTRAHEPATIC PORTOSYST REV  01/28/2022     IR TRANSVEN INTRAHEPATIC PORTOSYST REV  03/30/2022     KNEE SURGERY Left      KNEE SURGERY Right      RELEASE CARPAL TUNNEL       RELEASE TRIGGER FINGER Right 06/11/2020    Procedure: RELEASE, TRIGGER FINGER, right ring and long finger;  Surgeon: Pascual Valencia MD;  Location: [facility] OR     SIGMOIDOSCOPY FLEXIBLE N/A 10/31/2017    Procedure: SIGMOIDOSCOPY FLEXIBLE;;  Surgeon: Bryan  MD Armaan;  Location:  GI     TIPS Procedure  06/06/2018     TIPS PROCEDURE  11/01/2020     ZZC PLASTY KNEE,MED OR LAT COMPARTMT Right 02/19/2021    Procedure: RIGHT UNICOMPARTMENTAL ARTHROPLASTY KNEE MEDIAL;  Surgeon: José Miguel Landaverde MD;  Location: [facility];  Service: Orthopedics          Medications  Prior to Admission medications    Medication Sig Start Date End Date Taking? Authorizing Provider   acetaminophen (TYLENOL) 500 MG tablet Take 1,000 mg by mouth every 6 hours as needed for mild pain    Yes Reported, Patient   Ascorbic Acid (VITAMIN C PO) Take 500 mg by mouth daily   Yes Reported, Patient   docusate sodium (COLACE) 100 MG tablet Take 1 tablet (100 mg) by mouth daily 5/10/23  Yes Dylan Galaviz MD   eplerenone (INSPRA) 50 MG tablet Take 50 mg by mouth daily 2/22/24  Yes Reported, Patient   furosemide (LASIX) 20 MG tablet TAKE 1 TABLET BY MOUTH EVERY EVENING 12/26/23  Yes Gonzalo Wahl MD   furosemide (LASIX) 40 MG tablet Take 1 tablet (40 mg) by mouth every morning 10/3/22  Yes Gonzalo Wahl MD   lactulose (CHRONULAC) 10 GM/15ML solution Take 30 mLs (20 g) by mouth 3 times daily TAKE TO MAINTAIN 3 TO 4 BOWEL MOVEMENTS DAILY 9/22/23  Yes Tommy Foreman MD   MULTIPLE VITAMINS PO Take 1 tablet by mouth daily   Yes Reported, Patient   potassium chloride ER (KLOR-CON M) 20 MEQ CR tablet Take 2 tablets (40 mEq) by mouth daily 10/12/23  Yes Celestino Verduzco PA-C   sertraline (ZOLOFT) 25 MG tablet Take 1 tablet (25 mg) by mouth daily 4/3/23  Yes Gonzalo Wahl MD   sildenafil (REVATIO) 20 MG tablet Take 3-5 tabs 1/2-4 hours to relations 6/7/22  Yes Abena Acuña MD   vitamin D3 (CHOLECALCIFEROL) 2000 units (50 mcg) tablet Take 1 tablet by mouth daily   Yes Unknown, Entered By History       Allergies  Allergies   Allergen Reactions     Trazodone Visual Disturbance     Oxycodone Other (See Comments)     Delirium and constipation  Delirium and  constipation       Review of systems  A 10-point review of systems was negative    Examination  Gen- well, NAD, A+Ox3, normal color  CVS- RRR  RS- CTA  Abd- Soft, mild tenderness in LUQ just inferior to costal margin, no ascites or organomegaly on palpation or percussion, BS+  Extr- hands normal, no BESSY  Skin- no rash or jaundice  Neuro- no asterixis  Psych- normal mood    Laboratory  Lab on 10/09/2024   Component Date Value Ref Range Status     WBC Count 10/09/2024 3.6 (L)  4.0 - 11.0 10e3/uL Final     RBC Count 10/09/2024 3.82 (L)  4.40 - 5.90 10e6/uL Final     Hemoglobin 10/09/2024 12.2 (L)  13.3 - 17.7 g/dL Final     Hematocrit 10/09/2024 36.1 (L)  40.0 - 53.0 % Final     MCV 10/09/2024 95  78 - 100 fL Final     MCH 10/09/2024 31.9  26.5 - 33.0 pg Final     MCHC 10/09/2024 33.8  31.5 - 36.5 g/dL Final     RDW 10/09/2024 16.4 (H)  10.0 - 15.0 % Final     Platelet Count 10/09/2024 57 (L)  150 - 450 10e3/uL Final     INR 10/09/2024 1.81 (H)  0.85 - 1.15 Final     Sodium 10/09/2024 142  135 - 145 mmol/L Final     Potassium 10/09/2024 3.4  3.4 - 5.3 mmol/L Final     Chloride 10/09/2024 114 (H)  98 - 107 mmol/L Final     Carbon Dioxide (CO2) 10/09/2024 22  22 - 29 mmol/L Final     Anion Gap 10/09/2024 6 (L)  7 - 15 mmol/L Final     Glucose 10/09/2024 141 (H)  70 - 99 mg/dL Final     Urea Nitrogen 10/09/2024 10.3  8.0 - 23.0 mg/dL Final     Creatinine 10/09/2024 1.20 (H)  0.67 - 1.17 mg/dL Final     GFR Estimate 10/09/2024 69  >60 mL/min/1.73m2 Final    eGFR calculated using 2021 CKD-EPI equation.     Calcium 10/09/2024 8.7 (L)  8.8 - 10.4 mg/dL Final    Reference intervals for this test were updated on 7/16/2024 to reflect our healthy population more accurately. There may be differences in the flagging of prior results with similar values performed with this method. Those prior results can be interpreted in the context of the updated reference intervals.     Albumin 10/09/2024 2.8 (L)  3.5 - 5.2 g/dL Final      Phosphorus 10/09/2024 2.6  2.5 - 4.5 mg/dL Final     Alkaline Phosphatase 10/09/2024 140  40 - 150 U/L Final     ALT 10/09/2024 12  0 - 70 U/L Final     AST 10/09/2024 37  0 - 45 U/L Final     Bilirubin Total 10/09/2024 3.4 (H)  <=1.2 mg/dL Final     Bilirubin Direct 10/09/2024 1.16 (H)  0.00 - 0.30 mg/dL Final     Protein Total 10/09/2024 5.1 (L)  6.4 - 8.3 g/dL Final     MELD 3.0: 20 at 10/9/2024  7:36 AM  MELD-Na: 19 at 10/9/2024  7:36 AM  Calculated from:  Serum Creatinine: 1.20 mg/dL at 10/9/2024  7:36 AM  Serum Sodium: 142 mmol/L (Using max of 137 mmol/L) at 10/9/2024  7:36 AM  Total Bilirubin: 3.4 mg/dL at 10/9/2024  7:36 AM  Serum Albumin: 2.8 g/dL at 10/9/2024  7:36 AM  INR(ratio): 1.81 at 10/9/2024  7:36 AM  Age at listing (hypothetical): 59 years  Sex: Male at 10/9/2024  7:36 AM    Radiology  Liver TIPS doppler U/S 10/9/24 reviewed  Colonoscopy 3/31/2016 reviewed- 15mm TA in cecum    Assessment  59 year old male who presents for follow-up of decompensated alcohol-related cirrhosis complicated with ascites and hepatic encephalopathy.  MELD 3.0 = 20 (stable). Mild indirect hyperbilirubinemia likely attributed to hemolysis from TIPS. Ascites is well-controlled with TIPS and diuretics.  Hepatic encephalopathy is well-controlled on medical therapy.  Up-to-date with HCC screening.  Overdue for colorectal cancer screening. Declined COVID/flu.     Plan  Monitor LUQ pain--seek care if pain returns and persists   Low Na diet  Continue diuretics at current doses  Continue lactulose  Colonoscopy ordered  Follow-up with ESTEFANIA in 6 months    Patient was seen and staffed with MD Elaine Royal DO  Internal Medicine, PGY-3    Attestation with edits by Gonzalo Wahl MD at 10/9/2024  3:17 PM:  Physician Attestation  I, Gonzalo Bales, saw this patient with the learner and agree with the learner s findings and plan of care as documented in the note.  I personally reviewed vital signs, medications, labs, and  imaging.    Liver function stable.  Doing well overall.  Ultrasound today shows no cause of LUQ pain.  Would continue to monitor clinically.  Overdue for colonoscopy for colorectal cancer screening (large TA in 2016).    Gonzalo Bales  Date of Service (when I saw the patient): 10/09/24    I spent 30 minutes on the date of the encounter doing chart review, history and exam, documentation and further activities as noted above.       Again, thank you for allowing me to participate in the care of your patient.        Sincerely,        Gonzalo Bales MD

## 2024-10-16 DIAGNOSIS — K70.30 ALCOHOLIC CIRRHOSIS OF LIVER WITHOUT ASCITES (H): Primary | ICD-10-CM

## 2024-10-16 NOTE — PROGRESS NOTES
Plan of care per visit with Dr. Bales on 10/9/24:  - continue diuretics, patient only taking eplerenone 50 mg daily  - continue lactulose, patient taking Xifaxan and minimal lactulose  - colonoscopy ordered, provided patient scheduling number [phone]   - follow up with ESTEFANIA in 6 months, scheduled with Celestino Verduzco 4/    Called patient to discuss the above and answer any questions. There was no answer, message left and also sent my chart message.

## 2024-10-30 DIAGNOSIS — K76.82 HEPATIC ENCEPHALOPATHY (H): Primary | ICD-10-CM

## 2024-11-04 ENCOUNTER — PATIENT OUTREACH (OUTPATIENT)
Dept: GASTROENTEROLOGY | Facility: CLINIC | Age: 59
End: 2024-11-04
Payer: COMMERCIAL

## 2024-11-20 DIAGNOSIS — E87.6 HYPOKALEMIA: ICD-10-CM

## 2024-11-20 RX ORDER — POTASSIUM CHLORIDE 1500 MG/1
40 TABLET, EXTENDED RELEASE ORAL DAILY
Qty: 120 TABLET | Refills: 2 | Status: CANCELLED | OUTPATIENT
Start: 2024-11-20

## 2024-11-21 DIAGNOSIS — E87.6 HYPOKALEMIA: ICD-10-CM

## 2024-11-21 RX ORDER — POTASSIUM CHLORIDE 1500 MG/1
20 TABLET, EXTENDED RELEASE ORAL DAILY
Qty: 90 TABLET | Refills: 3 | Status: SHIPPED | OUTPATIENT
Start: 2024-11-21

## 2024-12-12 ENCOUNTER — LAB (OUTPATIENT)
Dept: LAB | Facility: CLINIC | Age: 59
End: 2024-12-12
Payer: COMMERCIAL

## 2024-12-12 DIAGNOSIS — K70.30 ALCOHOLIC CIRRHOSIS OF LIVER WITHOUT ASCITES (H): ICD-10-CM

## 2024-12-12 LAB
ALBUMIN SERPL BCG-MCNC: 2.7 G/DL (ref 3.5–5.2)
ALP SERPL-CCNC: 140 U/L (ref 40–150)
ALT SERPL W P-5'-P-CCNC: <5 U/L (ref 0–70)
ANION GAP SERPL CALCULATED.3IONS-SCNC: 7 MMOL/L (ref 7–15)
AST SERPL W P-5'-P-CCNC: 32 U/L (ref 0–45)
BILIRUB DIRECT SERPL-MCNC: 1.09 MG/DL (ref 0–0.3)
BILIRUB SERPL-MCNC: 2.9 MG/DL
BUN SERPL-MCNC: 10.2 MG/DL (ref 8–23)
CALCIUM SERPL-MCNC: 8.7 MG/DL (ref 8.8–10.4)
CHLORIDE SERPL-SCNC: 111 MMOL/L (ref 98–107)
CREAT SERPL-MCNC: 1.24 MG/DL (ref 0.67–1.17)
EGFRCR SERPLBLD CKD-EPI 2021: 66 ML/MIN/1.73M2
ERYTHROCYTE [DISTWIDTH] IN BLOOD BY AUTOMATED COUNT: 16.4 % (ref 10–15)
GLUCOSE SERPL-MCNC: 158 MG/DL (ref 70–99)
HCO3 SERPL-SCNC: 22 MMOL/L (ref 22–29)
HCT VFR BLD AUTO: 35.6 % (ref 40–53)
HGB BLD-MCNC: 12.1 G/DL (ref 13.3–17.7)
INR PPP: 1.56 (ref 0.85–1.15)
MCH RBC QN AUTO: 31.7 PG (ref 26.5–33)
MCHC RBC AUTO-ENTMCNC: 34 G/DL (ref 31.5–36.5)
MCV RBC AUTO: 93 FL (ref 78–100)
PLATELET # BLD AUTO: 63 10E3/UL (ref 150–450)
POTASSIUM SERPL-SCNC: 3.7 MMOL/L (ref 3.4–5.3)
PROT SERPL-MCNC: 5.1 G/DL (ref 6.4–8.3)
RBC # BLD AUTO: 3.82 10E6/UL (ref 4.4–5.9)
SODIUM SERPL-SCNC: 140 MMOL/L (ref 135–145)
T3FREE SERPL-MCNC: 2.5 PG/ML (ref 2–4.4)
TSH SERPL DL<=0.005 MIU/L-ACNC: 3.87 UIU/ML (ref 0.3–4.2)
WBC # BLD AUTO: 3.7 10E3/UL (ref 4–11)

## 2024-12-14 LAB
LABORATORY REPORT: NORMAL
PETH INTERPRETATION: NORMAL
PLPETH BLD-MCNC: <10 NG/ML
POPETH BLD-MCNC: <10 NG/ML

## 2024-12-18 ENCOUNTER — PATIENT OUTREACH (OUTPATIENT)
Dept: GASTROENTEROLOGY | Facility: CLINIC | Age: 59
End: 2024-12-18
Payer: COMMERCIAL

## 2024-12-18 NOTE — PROGRESS NOTES
Left message with spouse the result note that Celestino Verduzco wrote in his lab results from last week. The My Chart message regarding this was unread, as well as the My Chart results. Encouraged call back if further questions or concerns.

## 2024-12-23 ENCOUNTER — HOSPITAL ENCOUNTER (INPATIENT)
Facility: CLINIC | Age: 59
LOS: 2 days | Discharge: HOME | End: 2024-12-26
Attending: EMERGENCY MEDICINE | Admitting: STUDENT IN AN ORGANIZED HEALTH CARE EDUCATION/TRAINING PROGRAM
Payer: COMMERCIAL

## 2024-12-23 ENCOUNTER — APPOINTMENT (OUTPATIENT)
Dept: CT IMAGING | Facility: CLINIC | Age: 59
DRG: 442 | End: 2024-12-23
Attending: FAMILY MEDICINE
Payer: COMMERCIAL

## 2024-12-23 DIAGNOSIS — A49.9 UTI (URINARY TRACT INFECTION), BACTERIAL: Primary | ICD-10-CM

## 2024-12-23 DIAGNOSIS — K76.82 HEPATIC ENCEPHALOPATHY (H): ICD-10-CM

## 2024-12-23 DIAGNOSIS — N39.0 UTI (URINARY TRACT INFECTION), BACTERIAL: Primary | ICD-10-CM

## 2024-12-23 DIAGNOSIS — N25.89 RENAL TUBULAR ACIDOSIS: ICD-10-CM

## 2024-12-23 DIAGNOSIS — K70.31 ALCOHOLIC CIRRHOSIS OF LIVER WITH ASCITES (H): ICD-10-CM

## 2024-12-23 LAB
ALBUMIN SERPL BCG-MCNC: 2.7 G/DL (ref 3.5–5.2)
ALP SERPL-CCNC: 166 U/L (ref 40–150)
ALT SERPL W P-5'-P-CCNC: 9 U/L (ref 0–70)
AMMONIA PLAS-SCNC: 138 UMOL/L (ref 16–60)
ANION GAP SERPL CALCULATED.3IONS-SCNC: 6 MMOL/L (ref 7–15)
AST SERPL W P-5'-P-CCNC: 28 U/L (ref 0–45)
BASOPHILS # BLD AUTO: 0 10E3/UL (ref 0–0.2)
BASOPHILS NFR BLD AUTO: 0 %
BILIRUB DIRECT SERPL-MCNC: 0.83 MG/DL (ref 0–0.3)
BILIRUB SERPL-MCNC: 2.2 MG/DL
BUN SERPL-MCNC: 10.9 MG/DL (ref 8–23)
CALCIUM SERPL-MCNC: 8.8 MG/DL (ref 8.8–10.4)
CHLORIDE SERPL-SCNC: 115 MMOL/L (ref 98–107)
CREAT SERPL-MCNC: 1.29 MG/DL (ref 0.67–1.17)
EGFRCR SERPLBLD CKD-EPI 2021: 63 ML/MIN/1.73M2
EOSINOPHIL # BLD AUTO: 0.5 10E3/UL (ref 0–0.7)
EOSINOPHIL NFR BLD AUTO: 11 %
ERYTHROCYTE [DISTWIDTH] IN BLOOD BY AUTOMATED COUNT: 16.5 % (ref 10–15)
ETHANOL SERPL-MCNC: <0.01 G/DL
FLUAV RNA SPEC QL NAA+PROBE: NEGATIVE
FLUBV RNA RESP QL NAA+PROBE: NEGATIVE
GLUCOSE BLDC GLUCOMTR-MCNC: 129 MG/DL (ref 70–99)
GLUCOSE SERPL-MCNC: 158 MG/DL (ref 70–99)
HCO3 SERPL-SCNC: 22 MMOL/L (ref 22–29)
HCT VFR BLD AUTO: 36.3 % (ref 40–53)
HGB BLD-MCNC: 12.3 G/DL (ref 13.3–17.7)
HOLD SPECIMEN: NORMAL
IMM GRANULOCYTES # BLD: 0 10E3/UL
IMM GRANULOCYTES NFR BLD: 0 %
LIPASE SERPL-CCNC: 79 U/L (ref 13–60)
LYMPHOCYTES # BLD AUTO: 1.3 10E3/UL (ref 0.8–5.3)
LYMPHOCYTES NFR BLD AUTO: 28 %
MCH RBC QN AUTO: 31.5 PG (ref 26.5–33)
MCHC RBC AUTO-ENTMCNC: 33.9 G/DL (ref 31.5–36.5)
MCV RBC AUTO: 93 FL (ref 78–100)
MONOCYTES # BLD AUTO: 0.3 10E3/UL (ref 0–1.3)
MONOCYTES NFR BLD AUTO: 6 %
NEUTROPHILS # BLD AUTO: 2.4 10E3/UL (ref 1.6–8.3)
NEUTROPHILS NFR BLD AUTO: 54 %
NRBC # BLD AUTO: 0 10E3/UL
NRBC BLD AUTO-RTO: 0 /100
PLATELET # BLD AUTO: 63 10E3/UL (ref 150–450)
POTASSIUM SERPL-SCNC: 3.7 MMOL/L (ref 3.4–5.3)
PROT SERPL-MCNC: 5 G/DL (ref 6.4–8.3)
RBC # BLD AUTO: 3.9 10E6/UL (ref 4.4–5.9)
RSV RNA SPEC NAA+PROBE: NEGATIVE
SARS-COV-2 RNA RESP QL NAA+PROBE: NEGATIVE
SODIUM SERPL-SCNC: 143 MMOL/L (ref 135–145)
T4 FREE SERPL-MCNC: 0.71 NG/DL (ref 0.9–1.7)
TSH SERPL DL<=0.005 MIU/L-ACNC: 4.79 UIU/ML (ref 0.3–4.2)
WBC # BLD AUTO: 4.5 10E3/UL (ref 4–11)

## 2024-12-23 PROCEDURE — 87637 SARSCOV2&INF A&B&RSV AMP PRB: CPT | Performed by: EMERGENCY MEDICINE

## 2024-12-23 PROCEDURE — 99285 EMERGENCY DEPT VISIT HI MDM: CPT | Mod: 25 | Performed by: FAMILY MEDICINE

## 2024-12-23 PROCEDURE — 36415 COLL VENOUS BLD VENIPUNCTURE: CPT | Performed by: FAMILY MEDICINE

## 2024-12-23 PROCEDURE — G0378 HOSPITAL OBSERVATION PER HR: HCPCS

## 2024-12-23 PROCEDURE — 82077 ASSAY SPEC XCP UR&BREATH IA: CPT | Performed by: FAMILY MEDICINE

## 2024-12-23 PROCEDURE — 80321 ALCOHOLS BIOMARKERS 1OR 2: CPT | Performed by: FAMILY MEDICINE

## 2024-12-23 PROCEDURE — 250N000013 HC RX MED GY IP 250 OP 250 PS 637: Performed by: STUDENT IN AN ORGANIZED HEALTH CARE EDUCATION/TRAINING PROGRAM

## 2024-12-23 PROCEDURE — 87040 BLOOD CULTURE FOR BACTERIA: CPT | Performed by: FAMILY MEDICINE

## 2024-12-23 PROCEDURE — 85004 AUTOMATED DIFF WBC COUNT: CPT | Performed by: EMERGENCY MEDICINE

## 2024-12-23 PROCEDURE — 84439 ASSAY OF FREE THYROXINE: CPT | Performed by: FAMILY MEDICINE

## 2024-12-23 PROCEDURE — 83690 ASSAY OF LIPASE: CPT | Performed by: FAMILY MEDICINE

## 2024-12-23 PROCEDURE — 82140 ASSAY OF AMMONIA: CPT | Performed by: EMERGENCY MEDICINE

## 2024-12-23 PROCEDURE — 70450 CT HEAD/BRAIN W/O DYE: CPT

## 2024-12-23 PROCEDURE — 36415 COLL VENOUS BLD VENIPUNCTURE: CPT | Performed by: EMERGENCY MEDICINE

## 2024-12-23 PROCEDURE — 82962 GLUCOSE BLOOD TEST: CPT

## 2024-12-23 PROCEDURE — 80053 COMPREHEN METABOLIC PANEL: CPT | Performed by: EMERGENCY MEDICINE

## 2024-12-23 PROCEDURE — 82248 BILIRUBIN DIRECT: CPT | Performed by: FAMILY MEDICINE

## 2024-12-23 PROCEDURE — 85049 AUTOMATED PLATELET COUNT: CPT | Performed by: EMERGENCY MEDICINE

## 2024-12-23 PROCEDURE — 84443 ASSAY THYROID STIM HORMONE: CPT | Performed by: FAMILY MEDICINE

## 2024-12-23 PROCEDURE — 99222 1ST HOSP IP/OBS MODERATE 55: CPT | Performed by: STUDENT IN AN ORGANIZED HEALTH CARE EDUCATION/TRAINING PROGRAM

## 2024-12-23 PROCEDURE — 99285 EMERGENCY DEPT VISIT HI MDM: CPT | Performed by: FAMILY MEDICINE

## 2024-12-23 RX ORDER — ONDANSETRON 2 MG/ML
4 INJECTION INTRAMUSCULAR; INTRAVENOUS EVERY 6 HOURS PRN
Status: DISCONTINUED | OUTPATIENT
Start: 2024-12-23 | End: 2024-12-26 | Stop reason: HOSPADM

## 2024-12-23 RX ORDER — EPLERENONE 25 MG/1
50 TABLET, FILM COATED ORAL DAILY
Status: DISCONTINUED | OUTPATIENT
Start: 2024-12-24 | End: 2024-12-25

## 2024-12-23 RX ORDER — AMOXICILLIN 250 MG
1 CAPSULE ORAL 2 TIMES DAILY PRN
Status: DISCONTINUED | OUTPATIENT
Start: 2024-12-23 | End: 2024-12-26 | Stop reason: HOSPADM

## 2024-12-23 RX ORDER — ONDANSETRON 4 MG/1
4 TABLET, ORALLY DISINTEGRATING ORAL EVERY 6 HOURS PRN
Status: DISCONTINUED | OUTPATIENT
Start: 2024-12-23 | End: 2024-12-23

## 2024-12-23 RX ORDER — CALCIUM CARBONATE 500 MG/1
1000 TABLET, CHEWABLE ORAL 4 TIMES DAILY PRN
Status: DISCONTINUED | OUTPATIENT
Start: 2024-12-23 | End: 2024-12-26 | Stop reason: HOSPADM

## 2024-12-23 RX ORDER — ONDANSETRON 4 MG/1
4 TABLET, ORALLY DISINTEGRATING ORAL EVERY 6 HOURS PRN
Status: DISCONTINUED | OUTPATIENT
Start: 2024-12-23 | End: 2024-12-26 | Stop reason: HOSPADM

## 2024-12-23 RX ORDER — AMOXICILLIN 250 MG
2 CAPSULE ORAL 2 TIMES DAILY PRN
Status: DISCONTINUED | OUTPATIENT
Start: 2024-12-23 | End: 2024-12-26 | Stop reason: HOSPADM

## 2024-12-23 RX ORDER — SERTRALINE HYDROCHLORIDE 25 MG/1
25 TABLET, FILM COATED ORAL DAILY
Status: DISCONTINUED | OUTPATIENT
Start: 2024-12-24 | End: 2024-12-26 | Stop reason: HOSPADM

## 2024-12-23 RX ORDER — IOPAMIDOL 755 MG/ML
67 INJECTION, SOLUTION INTRAVASCULAR ONCE
Status: DISCONTINUED | OUTPATIENT
Start: 2024-12-23 | End: 2024-12-26 | Stop reason: HOSPADM

## 2024-12-23 RX ORDER — PROCHLORPERAZINE MALEATE 5 MG/1
10 TABLET ORAL EVERY 6 HOURS PRN
Status: DISCONTINUED | OUTPATIENT
Start: 2024-12-23 | End: 2024-12-26 | Stop reason: HOSPADM

## 2024-12-23 RX ORDER — ACETAMINOPHEN 500 MG
1000 TABLET ORAL EVERY 6 HOURS PRN
Status: DISCONTINUED | OUTPATIENT
Start: 2024-12-23 | End: 2024-12-26 | Stop reason: HOSPADM

## 2024-12-23 RX ORDER — LACTULOSE 10 G/15ML
20 SOLUTION ORAL 3 TIMES DAILY
Status: DISCONTINUED | OUTPATIENT
Start: 2024-12-24 | End: 2024-12-23

## 2024-12-23 RX ORDER — LIDOCAINE 40 MG/G
CREAM TOPICAL
Status: DISCONTINUED | OUTPATIENT
Start: 2024-12-23 | End: 2024-12-26 | Stop reason: HOSPADM

## 2024-12-23 RX ORDER — SODIUM BICARBONATE 650 MG/1
650 TABLET ORAL 2 TIMES DAILY
Status: DISCONTINUED | OUTPATIENT
Start: 2024-12-24 | End: 2024-12-24

## 2024-12-23 RX ORDER — LACTULOSE 10 G/15ML
20 SOLUTION ORAL 3 TIMES DAILY
Status: DISCONTINUED | OUTPATIENT
Start: 2024-12-23 | End: 2024-12-25

## 2024-12-23 RX ORDER — ONDANSETRON 2 MG/ML
4 INJECTION INTRAMUSCULAR; INTRAVENOUS EVERY 6 HOURS PRN
Status: DISCONTINUED | OUTPATIENT
Start: 2024-12-23 | End: 2024-12-23

## 2024-12-23 RX ADMIN — RIFAXIMIN 550 MG: 550 TABLET ORAL at 23:54

## 2024-12-23 RX ADMIN — LACTULOSE 20 G: 20 SOLUTION ORAL at 23:54

## 2024-12-23 ASSESSMENT — COLUMBIA-SUICIDE SEVERITY RATING SCALE - C-SSRS
1. IN THE PAST MONTH, HAVE YOU WISHED YOU WERE DEAD OR WISHED YOU COULD GO TO SLEEP AND NOT WAKE UP?: NO
2. HAVE YOU ACTUALLY HAD ANY THOUGHTS OF KILLING YOURSELF IN THE PAST MONTH?: NO
6. HAVE YOU EVER DONE ANYTHING, STARTED TO DO ANYTHING, OR PREPARED TO DO ANYTHING TO END YOUR LIFE?: NO

## 2024-12-23 ASSESSMENT — ACTIVITIES OF DAILY LIVING (ADL)
ADLS_ACUITY_SCORE: 54

## 2024-12-24 ENCOUNTER — APPOINTMENT (OUTPATIENT)
Dept: ULTRASOUND IMAGING | Facility: CLINIC | Age: 59
DRG: 442 | End: 2024-12-24
Attending: STUDENT IN AN ORGANIZED HEALTH CARE EDUCATION/TRAINING PROGRAM
Payer: COMMERCIAL

## 2024-12-24 LAB
ALBUMIN SERPL BCG-MCNC: 2.3 G/DL (ref 3.5–5.2)
ALBUMIN UR-MCNC: NEGATIVE MG/DL
ALP SERPL-CCNC: 116 U/L (ref 40–150)
ALT SERPL W P-5'-P-CCNC: 8 U/L (ref 0–70)
AMMONIA PLAS-SCNC: 73 UMOL/L (ref 16–60)
ANION GAP SERPL CALCULATED.3IONS-SCNC: 6 MMOL/L (ref 7–15)
APPEARANCE UR: CLEAR
AST SERPL W P-5'-P-CCNC: 22 U/L (ref 0–45)
BILIRUB DIRECT SERPL-MCNC: 0.96 MG/DL (ref 0–0.3)
BILIRUB SERPL-MCNC: 3 MG/DL
BILIRUB UR QL STRIP: NEGATIVE
BUN SERPL-MCNC: 11.1 MG/DL (ref 8–23)
CALCIUM SERPL-MCNC: 8.4 MG/DL (ref 8.8–10.4)
CHLORIDE SERPL-SCNC: 118 MMOL/L (ref 98–107)
COLOR UR AUTO: YELLOW
CREAT SERPL-MCNC: 1.22 MG/DL (ref 0.67–1.17)
EGFRCR SERPLBLD CKD-EPI 2021: 67 ML/MIN/1.73M2
ERYTHROCYTE [DISTWIDTH] IN BLOOD BY AUTOMATED COUNT: 16.7 % (ref 10–15)
GLUCOSE SERPL-MCNC: 84 MG/DL (ref 70–99)
GLUCOSE UR STRIP-MCNC: NEGATIVE MG/DL
HCO3 SERPL-SCNC: 21 MMOL/L (ref 22–29)
HCT VFR BLD AUTO: 32.6 % (ref 40–53)
HGB BLD-MCNC: 10.9 G/DL (ref 13.3–17.7)
HGB UR QL STRIP: ABNORMAL
HOLD SPECIMEN: NORMAL
HYALINE CASTS: 1 /LPF
KETONES UR STRIP-MCNC: NEGATIVE MG/DL
LEUKOCYTE ESTERASE UR QL STRIP: ABNORMAL
MCH RBC QN AUTO: 31.2 PG (ref 26.5–33)
MCHC RBC AUTO-ENTMCNC: 33.4 G/DL (ref 31.5–36.5)
MCV RBC AUTO: 93 FL (ref 78–100)
NITRATE UR QL: NEGATIVE
PH UR STRIP: 6.5 [PH] (ref 5–7)
PLATELET # BLD AUTO: 60 10E3/UL (ref 150–450)
POTASSIUM SERPL-SCNC: 3.8 MMOL/L (ref 3.4–5.3)
PROT SERPL-MCNC: 4.3 G/DL (ref 6.4–8.3)
RBC # BLD AUTO: 3.49 10E6/UL (ref 4.4–5.9)
RBC URINE: 24 /HPF
SODIUM SERPL-SCNC: 145 MMOL/L (ref 135–145)
SP GR UR STRIP: 1.01 (ref 1–1.03)
UROBILINOGEN UR STRIP-MCNC: 8 MG/DL
WBC # BLD AUTO: 4.2 10E3/UL (ref 4–11)
WBC URINE: 95 /HPF

## 2024-12-24 PROCEDURE — 250N000013 HC RX MED GY IP 250 OP 250 PS 637: Performed by: STUDENT IN AN ORGANIZED HEALTH CARE EDUCATION/TRAINING PROGRAM

## 2024-12-24 PROCEDURE — 82140 ASSAY OF AMMONIA: CPT | Performed by: STUDENT IN AN ORGANIZED HEALTH CARE EDUCATION/TRAINING PROGRAM

## 2024-12-24 PROCEDURE — 120N000001 HC R&B MED SURG/OB

## 2024-12-24 PROCEDURE — 81003 URINALYSIS AUTO W/O SCOPE: CPT | Performed by: STUDENT IN AN ORGANIZED HEALTH CARE EDUCATION/TRAINING PROGRAM

## 2024-12-24 PROCEDURE — G0378 HOSPITAL OBSERVATION PER HR: HCPCS

## 2024-12-24 PROCEDURE — 36415 COLL VENOUS BLD VENIPUNCTURE: CPT | Performed by: STUDENT IN AN ORGANIZED HEALTH CARE EDUCATION/TRAINING PROGRAM

## 2024-12-24 PROCEDURE — 82248 BILIRUBIN DIRECT: CPT | Performed by: STUDENT IN AN ORGANIZED HEALTH CARE EDUCATION/TRAINING PROGRAM

## 2024-12-24 PROCEDURE — 76700 US EXAM ABDOM COMPLETE: CPT

## 2024-12-24 PROCEDURE — 87186 SC STD MICRODIL/AGAR DIL: CPT | Performed by: STUDENT IN AN ORGANIZED HEALTH CARE EDUCATION/TRAINING PROGRAM

## 2024-12-24 PROCEDURE — 85014 HEMATOCRIT: CPT | Performed by: STUDENT IN AN ORGANIZED HEALTH CARE EDUCATION/TRAINING PROGRAM

## 2024-12-24 PROCEDURE — 99232 SBSQ HOSP IP/OBS MODERATE 35: CPT | Performed by: STUDENT IN AN ORGANIZED HEALTH CARE EDUCATION/TRAINING PROGRAM

## 2024-12-24 RX ORDER — POTASSIUM CHLORIDE 750 MG/1
10 TABLET, EXTENDED RELEASE ORAL 3 TIMES DAILY
Status: DISCONTINUED | OUTPATIENT
Start: 2024-12-24 | End: 2024-12-26 | Stop reason: HOSPADM

## 2024-12-24 RX ORDER — POLYETHYLENE GLYCOL 3350 17 G/17G
17 POWDER, FOR SOLUTION ORAL 2 TIMES DAILY
Status: DISCONTINUED | OUTPATIENT
Start: 2024-12-24 | End: 2024-12-25

## 2024-12-24 RX ORDER — SODIUM BICARBONATE 650 MG/1
650 TABLET ORAL 4 TIMES DAILY
Status: DISCONTINUED | OUTPATIENT
Start: 2024-12-24 | End: 2024-12-26

## 2024-12-24 RX ORDER — ZINC SULFATE 50(220)MG
220 CAPSULE ORAL DAILY
Status: DISCONTINUED | OUTPATIENT
Start: 2024-12-24 | End: 2024-12-26 | Stop reason: HOSPADM

## 2024-12-24 RX ADMIN — LACTULOSE 20 G: 20 SOLUTION ORAL at 09:31

## 2024-12-24 RX ADMIN — POLYETHYLENE GLYCOL 3350 17 G: 17 POWDER, FOR SOLUTION ORAL at 09:29

## 2024-12-24 RX ADMIN — SODIUM BICARBONATE 650 MG: 650 TABLET ORAL at 09:32

## 2024-12-24 RX ADMIN — Medication 220 MG: at 12:26

## 2024-12-24 RX ADMIN — SERTRALINE HYDROCHLORIDE 25 MG: 25 TABLET ORAL at 09:31

## 2024-12-24 RX ADMIN — EPLERENONE 50 MG: 25 TABLET, FILM COATED ORAL at 09:31

## 2024-12-24 RX ADMIN — POTASSIUM CHLORIDE 10 MEQ: 750 TABLET, FILM COATED, EXTENDED RELEASE ORAL at 20:08

## 2024-12-24 RX ADMIN — RIFAXIMIN 550 MG: 550 TABLET ORAL at 20:08

## 2024-12-24 RX ADMIN — SODIUM BICARBONATE 650 MG: 650 TABLET ORAL at 16:50

## 2024-12-24 RX ADMIN — LACTULOSE 20 G: 20 SOLUTION ORAL at 20:08

## 2024-12-24 RX ADMIN — RIFAXIMIN 550 MG: 550 TABLET ORAL at 09:31

## 2024-12-24 RX ADMIN — SODIUM BICARBONATE 650 MG: 650 TABLET ORAL at 21:27

## 2024-12-24 RX ADMIN — POLYETHYLENE GLYCOL 3350 17 G: 17 POWDER, FOR SOLUTION ORAL at 20:08

## 2024-12-24 RX ADMIN — SODIUM BICARBONATE 650 MG: 650 TABLET ORAL at 12:26

## 2024-12-24 RX ADMIN — LACTULOSE 20 G: 20 SOLUTION ORAL at 13:47

## 2024-12-24 RX ADMIN — POTASSIUM CHLORIDE 10 MEQ: 750 TABLET, FILM COATED, EXTENDED RELEASE ORAL at 14:59

## 2024-12-24 ASSESSMENT — ACTIVITIES OF DAILY LIVING (ADL)
ADLS_ACUITY_SCORE: 36

## 2024-12-24 NOTE — PROGRESS NOTES
[facility]    Medicine Progress Note - Hospitalist Service    Date of Admission:  12/23/2024    Assessment & Plan      Ivan Bell is a 59 year old male with past medical history of alcoholic liver cirrhosis, recurrent hepatic encephalopathy, CKD, depression, gout, kidney stones and hypertension that presented to the emergency department with confusion and concern for recurrence of hepatic encephalopathy and admitted on 12/23/2024 for hepatic encephalopathy.     # Hepatic encephalopathy  # Alcoholic cirrhosis with hs of ascites s/p TIPS with multiple revisions   # Hx variceal bleed 10/2017  Patient reported to this admission with confusion and his wife raise concern that he had recurrence of hepatic encephalopathy. CT head without acute findings and ammonia elevated consistent with hepatic encephalopathy patient has had several episodes of hepatic encephalopathy within the last year and has a history of TIPS.  ED providers discussed with Dr. Be of hepatology who was in agreement with admission at [facility]. Increasing his lactulose, and obtaining an ultrasound of his TIPS with Doppler. Unclear cause of patient's worsening encephalopathy.  He has been sober from alcohol for 8 years. MELD is stable at 18. Last gastroenterology clinic appointment 10/09/2024    - US TIPS Doppler pending  - Increased PTA lactulose, titrate for 3-4 loose BM per day  - Continued PTA rifampin  - Added miralax BID 12/24  - Added zinc sulfate 12/24  - Consider further discussion with gastroenterology if not improving  - Continued PTA eplerenone    # Facial laceration, left  Patient got a cut around his left eye from a scope while hunting about 2 weeks prior to presentation. Appears to be well healing.     # Hx nephrolithiasis   Is followed in nephrology comprehensive stone clinic.  -Continued PTA bicarbonate    # CKD  Patients Cr appears to be around 1.2 at baseline. 1.29 on presentation.   - BMP  with am labs     # Erectile dysfunction  -Holding PTA sildenafil during admission    # Mild normocytic anemia   Patient appears to be chronically anemic. No signs of bleeding. Further evaluation pending course.  - CBC with am labs     # Thrombocytopenia  Patient's platelets typically around 60. Likely secondary to liver disease. platelets on admission 63.  -CBC with a.m. labs    # Depression   -Continued PTA sertraline          Diet: Regular Diet Adult    DVT Prophylaxis: Pneumatic Compression Devices and plt <100k  Blue Catheter: Not present  Lines: None     Cardiac Monitoring: None  Code Status: Full Code      Clinically Significant Risk Factors Present on Admission          # Hyperchloremia: Highest Cl = 118 mmol/L in last 2 days, will monitor as appropriate          # Hypoalbuminemia: Lowest albumin = 2.3 g/dL at 12/24/2024  6:43 AM, will monitor as appropriate   # Thrombocytopenia: Lowest platelets = 60 in last 2 days, will monitor for bleeding   # Hypertension: Noted on problem list      # Anemia: based on hgb <11                  Social Drivers of Health    Tobacco Use: Medium Risk (10/9/2024)    Patient History     Smoking Tobacco Use: Former     Smokeless Tobacco Use: Former     Passive Exposure: Never          Disposition Plan     Medically Ready for Discharge: Anticipated in 2-4 Days             Harmeet Palmer DO  Hospitalist Service  [facility]  Securely message with [employer] (more info)  Text page via [employer] Paging/Directory   ______________________________________________________________________    Interval History   Naeo. Still having asterixis. Only 1 BM today.     Physical Exam   Vital Signs: Temp: 97.8  F (36.6  C) Temp src: Oral BP: (!) 151/68 Pulse: 68   Resp: 16 SpO2: 96 % O2 Device: None (Room air)    Weight: 169 lbs 15.59 oz    Physical Exam  Constitutional:       General: Pt is not in acute distress.  HENT:      Head: Normocephalic and atraumatic.      Nose: Nose  normal.   Eyes:      Conjunctiva/sclera: Conjunctivae normal.   Pulmonary:      Effort: Pulmonary effort is normal.   Abdominal:      General: Abdomen is flat.   Skin:     Findings: No rash.   Neurological:      General: No focal deficit present.      Mental Status: Pt is alert.   Psychiatric:         Mood and Affect: Mood normal.       Medical Decision Making       45 MINUTES SPENT BY ME on the date of service doing chart review, history, exam, documentation & further activities per the note.      Data     I have personally reviewed the following data over the past 24 hrs:    4.2  \   10.9 (L)   / 60 (L)     145 118 (H) 11.1 /  84   3.8 21 (L) 1.22 (H) \     ALT: 8 AST: 22 AP: 116 TBILI: 3.0 (H)   ALB: 2.3 (L) TOT PROTEIN: 4.3 (L) LIPASE: 79 (H)     TSH: 4.79 (H) T4: 0.71 (L) A1C: N/A       Imaging results reviewed over the past 24 hrs:   Recent Results (from the past 24 hours)   CT Head w/o Contrast    Narrative    EXAM: CT HEAD W/O CONTRAST  LOCATION: [facility]  DATE: 12/23/2024    INDICATION: Headache, closed head injury, altered mental status, history of hepatic encephalopathy  COMPARISON: 11/28/2023  TECHNIQUE: Routine CT Head without IV contrast. Multiplanar reformats. Dose reduction techniques were used.    FINDINGS:  INTRACRANIAL CONTENTS: No intracranial hemorrhage, extraaxial collection, or mass effect.  No CT evidence of acute infarct. Mild presumed chronic small vessel ischemic changes. There is intracranial atherosclerosis. Mild generalized volume loss. No   hydrocephalus.     VISUALIZED ORBITS/SINUSES/MASTOIDS: No intraorbital abnormality. No paranasal sinus mucosal disease. No middle ear or mastoid effusion.    BONES/SOFT TISSUES: No acute abnormality.      Impression    IMPRESSION:  1.  No acute intracranial process.   US Abdomen Complete with TIPS Doppler    Narrative    US ABDOMEN COMPLETE WITH TIPS DOPPLER 12/24/2024 9:43 AM    CLINICAL HISTORY: Patient with TIPS and  hepatic encephalopathy.    TECHNIQUE: Complete abdominal ultrasound. Color flow with spectral  Doppler and waveform analysis performed.    COMPARISON: Abdominal ultrasounds, most recently 10/9/2024. CT of the  abdomen and pelvis dated 4/12/2023.     FINDINGS:    GALLBLADDER: Gallstones in an otherwise normal gallbladder. No wall  thickening, or pericholecystic fluid. Negative sonographic Zimmer's  sign per the sonographer.     BILE DUCTS: No biliary dilatation. The common duct measures 3 mm.    LIVER: Coarsened echotexture, suggesting underlying fibrosis. Nodular  contour. No focal mass.     RIGHT KIDNEY: Normal size. Normal echogenicity with no hydronephrosis  or mass. Nonobstructive nephrolithiasis, with shadowing stones in the  midpole measuring 15 x 13 mm, 9 x 10 mm and 10 x 12 mm.    LEFT KIDNEY: Normal size. Normal echogenicity with no hydronephrosis  or mass. Nonobstructive nephrolithiasis, with 9 and 10 mm lower pole  stones and an 11 mm mid pole stone.    SPLEEN: Splenomegaly, measuring 15.3 cm in length. Previously, the  spleen measured 15.9 cm.    PANCREAS: The pancreas is largely obscured by overlying gas.    AORTA: Normal in caliber.    IVC: Normal where visualized.    No ascites.    TIPS DUPLEX: Patent TIPS with antegrade flow towards the hepatic vein,  and normal waveforms. The following peak systolic velocities were  obtained:    Proximal to stent: 49 cm/s  Portal vein end of stent: 123 cm/s  Mid stent: 216 cm/s, previously 160 cm/s  Hepatic vein end of stent: 205 cm/s, previously 171 cm/s  Distal to stent: 53 cm/s    The left and right hepatic veins are not well demonstrated. The IVC  and splenic vein are patent with antegrade flow. The hepatic artery is  patent with low resistance monophasic waveforms. Peak systolic  velocity is 67 cm/s.      Impression    IMPRESSION:  1.  Cirrhotic appearance of the liver. No focal mass.    2.  Splenomegaly, suggestive of portal hypertension. No ascites.    3.   Patent TIPS with antegrade flow. Mildly elevated velocities,  increased from prior. This may represent mild or developing stenosis.  Recommend surveillance.    4.  Bilateral nonobstructive nephrolithiasis.    5.  Cholelithiasis without sonographic evidence for acute  cholecystitis.    6.  The pancreas is obscured by overlying bowel gas.

## 2024-12-24 NOTE — PROGRESS NOTES
Writer returned call to patient's wife (Elicia) and provided an update.  Elicia stated she might come up in a little bit and see the patient.    Ángel DOBBS [facility]

## 2024-12-24 NOTE — PLAN OF CARE
Problem: Adult Inpatient Plan of Care  Goal: Plan of Care Review  Description: The Plan of Care Review/Shift note should be completed every shift.  The Outcome Evaluation is a brief statement about your assessment that the patient is improving, declining, or no change.  This information will be displayed automatically on your shift  note.  Flowsheets (Taken 12/24/2024 0663)  Outcome Evaluation: Pt alert with some intermittent confusion. Pt denies pain and has to be redirected in the bathroom on where to urinate. Pt able to be directed back to bed and slept throughout the night. Pt SBA.  Plan of Care Reviewed With: patient  Overall Patient Progress: improving  Goal: Absence of Hospital-Acquired Illness or Injury  Intervention: Identify and Manage Fall Risk  Recent Flowsheet Documentation  Taken 12/23/2024 2238 by Lida Mccarthy RN  Safety Promotion/Fall Prevention:   activity supervised   patient and family education   nonskid shoes/slippers when out of bed   supervised activity  Goal: Readiness for Transition of Care  Intervention: Mutually Develop Transition Plan  Recent Flowsheet Documentation  Taken 12/23/2024 2238 by Lida Mccarthy, RN  Equipment Currently Used at Home: none     Problem: Fall Injury Risk  Goal: Absence of Fall and Fall-Related Injury  Intervention: Identify and Manage Contributors  Recent Flowsheet Documentation  Taken 12/23/2024 2238 by Lida Mccarthy, RN  Medication Review/Management: medications reviewed  Intervention: Promote Injury-Free Environment  Recent Flowsheet Documentation  Taken 12/23/2024 2238 by Lida Mccarthy, RN  Safety Promotion/Fall Prevention:   activity supervised   patient and family education   nonskid shoes/slippers when out of bed   supervised activity       Goal Outcome Evaluation:      Plan of Care Reviewed With: patient    Overall Patient Progress: improvingOverall Patient Progress: improving    Outcome Evaluation: Pt alert with some intermittent confusion. Pt  denies pain and has to be redirected in the bathroom on where to urinate. Pt able to be directed back to bed and slept throughout the night. Pt SBA.

## 2024-12-24 NOTE — PROGRESS NOTES
"[facility] [facility] ADMISSION NOTE    Patient admitted to room 2317 at approximately 2238 via cart from emergency room. Patient was accompanied by transport tech.     Verbal SBAR report received from ED RN prior to patient arrival.     Patient ambulated to bed with stand-by assist. Patient alert and oriented X 4. The patient is not having any pain.  . Admission vital signs: Blood pressure (!) 167/80, pulse 68, temperature 97.5  F (36.4  C), temperature source Oral, resp. rate 19, height 1.778 m (5' 10\"), weight 77.1 kg (169 lb 15.6 oz), SpO2 99%. Patient was oriented to plan of care, call light, bed controls, tv, telephone, bathroom, and visiting hours.     Risk Assessment    The following safety risks were identified during admission: fall. Yellow risk band applied: YES.     Skin Initial Assessment    This writer admitted this patient and completed a full skin assessment and Tanmay score in the Adult PCS flowsheet.   Photo documentation of skin problem and/or wound competed via [employer] application (located under Media):  N/A    Appropriate interventions initiated as needed.     Secondary skin check completed by Niki OBRIEN RN.         Education    Patient has a [place] to Observation order: Yes  Observation education completed and documented: Yes      Lida Mccarthy RN      "
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
## 2024-12-24 NOTE — ED NOTES
"[facility]   Admission Handoff    The patient is Ivan Bell, 59 year old who arrived in the ED by CAR from home with a complaint of Altered Mental Status  . The patient's current symptoms are an exacerbation of a chronic illness and during this time the symptoms have remained the same. In the ED, patient was diagnosed with   Final diagnoses:   Hepatic encephalopathy (H)         Needed?: No    Allergies:    Allergies   Allergen Reactions    Trazodone Visual Disturbance    Oxycodone Other (See Comments)     Delirium and constipation  Delirium and constipation       Past Medical Hx:   Past Medical History:   Diagnosis Date    Alcoholic cirrhosis of liver (H)     Alcoholic hepatitis (H) 03/2019    Chronic kidney disease     CRF    Depression     Gout     History of transfusion     Hypertension     Hypertriglyceridemia     Left calcaneus fracture 01/09/2006 January 16, 2006: Fell 10 feet from ladder onto left foot on frozen ground on 1/9/06 at home.  Immediate pain and unable to walk- seen at [place] and diagnosed with calcaneus fracture    Nephrolithiasis     Osteoarthritis     Portal vein thrombosis     left occlusion, partial main    Thrombocytopenia (H)        Initial vitals were: BP: (!) 181/81  Pulse: 77  Temp: 97.6  F (36.4  C)  Resp: 18  Height: 177.8 cm (5' 10\")  Weight: 77.1 kg (170 lb)  SpO2: 99 %   Recent vital Signs: BP (!) 167/78   Pulse 69   Temp 97.6  F (36.4  C) (Oral)   Resp 13   Ht 1.778 m (5' 10\")   Wt 77.1 kg (170 lb)   SpO2 99%   BMI 24.39 kg/m      Elimination Status: Continent: Yes     Activity Level: SBA    Fall Status: Reason for falls risk: Cognition  bed/chair alarm on, nonskid shoes/slippers when out of bed, and arm band in place    Baseline Mental status: WDL  Current Mental Status changes: acute confusion    Infection present or suspected this encounter: cultures pending  Sepsis suspected: No    Isolation type:     Bariatric equipment " needed?: No    In the ED these meds were given:   Medications   iopamidol (ISOVUE-370) solution 67 mL ( Intravenous Canceled Entry 12/23/24 1934)   sodium chloride 0.9 % bag 500mL for CT scan flush use ( Intravenous Canceled Entry 12/23/24 1934)       Drips running?  No    Home pump  No    Current LDAs: Peripheral IV: Site L AC; Gauge 18  none     Results:   Labs/Imaging  Ordered and Resulted from Time of ED Arrival Up to the Time of Departure from the ED  Results for orders placed or performed during the hospital encounter of 12/23/24 (from the past 24 hours)   Glucose by meter   Result Value Ref Range    GLUCOSE BY METER POCT 129 (H) 70 - 99 mg/dL   Influenza A/B, RSV and SARS-CoV2 PCR (COVID-19) Nose    Specimen: Nose; Swab   Result Value Ref Range    Influenza A PCR Negative Negative    Influenza B PCR Negative Negative    RSV PCR Negative Negative    SARS CoV2 PCR Negative Negative    Narrative    Testing was performed using the Xpert Xpress CoV2/Flu/RSV Assay on the Cepheid GeneXpert Instrument. This test should be ordered for the detection of SARS-CoV2, influenza, and RSV viruses in individuals with signs and symptoms of respiratory tract infection. This test is for in vitro diagnostic use under the [place] FDA for laboratories certified under CLIA to perform high or moderate complexity testing. This test has been [place] FDA cleared. A negative result does not rule out the presence of PCR inhibitors in the specimen or target RNA in concentration below the limit of detection for the assay. If only one viral target is positive but coinfection with multiple targets is suspected, the sample should be re-tested with another FDA cleared, approved, or authorized test, if coninfection would change clinical management. This test was validated by the [facility]. These laboratories are certified under the Clinical Laboratory Improvement Amendments of 1988 (CLIA-88) as qualified to perfom high complexity  laboratory testing.   Basic metabolic panel   Result Value Ref Range    Sodium 143 135 - 145 mmol/L    Potassium 3.7 3.4 - 5.3 mmol/L    Chloride 115 (H) 98 - 107 mmol/L    Carbon Dioxide (CO2) 22 22 - 29 mmol/L    Anion Gap 6 (L) 7 - 15 mmol/L    Urea Nitrogen 10.9 8.0 - 23.0 mg/dL    Creatinine 1.29 (H) 0.67 - 1.17 mg/dL    GFR Estimate 63 >60 mL/min/1.73m2    Calcium 8.8 8.8 - 10.4 mg/dL    Glucose 158 (H) 70 - 99 mg/dL   Ammonia (on ice)   Result Value Ref Range    Ammonia 138 (HH) 16 - 60 umol/L   CBC with platelets, differential    Narrative    The following orders were created for panel order CBC with platelets, differential.  Procedure                               Abnormality         Status                     ---------                               -----------         ------                     CBC with platelets and d...[[id number]]  Abnormal            Final result                 Please view results for these tests on the individual orders.   [place] Draw    Narrative    The following orders were created for panel order [place] Draw.  Procedure                               Abnormality         Status                     ---------                               -----------         ------                     Extra Green Top (Lithium...[[id number]]                      Final result                 Please view results for these tests on the individual orders.   CBC with platelets and differential   Result Value Ref Range    WBC Count 4.5 4.0 - 11.0 10e3/uL    RBC Count 3.90 (L) 4.40 - 5.90 10e6/uL    Hemoglobin 12.3 (L) 13.3 - 17.7 g/dL    Hematocrit 36.3 (L) 40.0 - 53.0 %    MCV 93 78 - 100 fL    MCH 31.5 26.5 - 33.0 pg    MCHC 33.9 31.5 - 36.5 g/dL    RDW 16.5 (H) 10.0 - 15.0 %    Platelet Count 63 (L) 150 - 450 10e3/uL    % Neutrophils 54 %    % Lymphocytes 28 %    % Monocytes 6 %    % Eosinophils 11 %    % Basophils 0 %    % Immature Granulocytes 0 %    NRBCs per 100 WBC 0 <1 /100    Absolute Neutrophils  2.4 1.6 - 8.3 10e3/uL    Absolute Lymphocytes 1.3 0.8 - 5.3 10e3/uL    Absolute Monocytes 0.3 0.0 - 1.3 10e3/uL    Absolute Eosinophils 0.5 0.0 - 0.7 10e3/uL    Absolute Basophils 0.0 0.0 - 0.2 10e3/uL    Absolute Immature Granulocytes 0.0 <=0.4 10e3/uL    Absolute NRBCs 0.0 10e3/uL   Extra Green Top (Lithium Heparin) ON ICE   Result Value Ref Range    Hold Specimen [facility]    Hepatic panel   Result Value Ref Range    Protein Total 5.0 (L) 6.4 - 8.3 g/dL    Albumin 2.7 (L) 3.5 - 5.2 g/dL    Bilirubin Total 2.2 (H) <=1.2 mg/dL    Alkaline Phosphatase 166 (H) 40 - 150 U/L    AST 28 0 - 45 U/L    ALT 9 0 - 70 U/L    Bilirubin Direct 0.83 (H) 0.00 - 0.30 mg/dL   Lipase   Result Value Ref Range    Lipase 79 (H) 13 - 60 U/L   Alcohol level blood   Result Value Ref Range    Alcohol ethyl <0.01 <=0.01 g/dL   TSH with free T4 reflex   Result Value Ref Range    TSH 4.79 (H) 0.30 - 4.20 uIU/mL   CT Head w/o Contrast    Narrative    EXAM: CT HEAD W/O CONTRAST  LOCATION: [facility]  DATE: 12/23/2024    INDICATION: Headache, closed head injury, altered mental status, history of hepatic encephalopathy  COMPARISON: 11/28/2023  TECHNIQUE: Routine CT Head without IV contrast. Multiplanar reformats. Dose reduction techniques were used.    FINDINGS:  INTRACRANIAL CONTENTS: No intracranial hemorrhage, extraaxial collection, or mass effect.  No CT evidence of acute infarct. Mild presumed chronic small vessel ischemic changes. There is intracranial atherosclerosis. Mild generalized volume loss. No   hydrocephalus.     VISUALIZED ORBITS/SINUSES/MASTOIDS: No intraorbital abnormality. No paranasal sinus mucosal disease. No middle ear or mastoid effusion.    BONES/SOFT TISSUES: No acute abnormality.      Impression    IMPRESSION:  1.  No acute intracranial process.     *Note: Due to a large number of results and/or encounters for the requested time period, some results have not been displayed. A complete set of results  can be found in Results Review.       For the majority of the shift this patient's behavior was Green     Cardiac Rhythm: Normal Sinus  Pt needs tele? Yes  Skin/wound Issues: None    Code Status: Full Code    Pain control: pt had none    Nausea control: pt had none    Abnormal labs/tests/findings requiring intervention:     Patient tested for COVID 19 prior to admission: YES     OBS brochure/video discussed/provided to patient/family: Yes     Family present during ED course? Yes     Family Comments/Social Situation comments:     Tasks needing completion: None    Mely Mauro RN

## 2024-12-24 NOTE — PROGRESS NOTES
Skin affirmation note    Admitting nurse completed full skin assessment, Tanmay score and Tanmay interventions. This writer agrees with the initial skin assessment findings.    Niki Walker RN on 12/24/2024 at 5:06 AM

## 2024-12-24 NOTE — H&P
[facility]    History and Physical - Hospitalist Service       Date of Admission:  12/23/2024    Assessment & Plan      Ivan Bell is a 59 year old male with past medical history of alcoholic liver cirrhosis, recurrent hepatic encephalopathy, CKD, depression, gout, kidney stones and hypertension that presented to the emergency department with confusion and concern for recurrence of hepatic encephalopathy and admitted on 12/23/2024 for hepatic encephalopathy.     # Hepatic encephalopathy  # Alcoholic cirrhosis with hs of ascites s/p TIPS with multiple revisions   # Hx variceal bleed 10/2017  Patient reported to this admission with confusion and his wife raise concern that he had recurrence of hepatic encephalopathy. CT head without acute findings and ammonia elevated consistent with hepatic encephalopathy patient has had several episodes of hepatic encephalopathy within the last year and has a history of TIPS.  ED providers discussed with Dr. Be of hepatology who was in agreement with admission at [facility]. Increasing his lactulose, and obtaining an ultrasound of his TIPS with Doppler. Unclear cause of patient's worsening encephalopathy.  He has been sober from alcohol for 8 years. MELD is stable at 18. Last gastroenterology clinic appointment 10/09/2024  -N.p.o. to allow for TIPS ultrasound with Doppler  -Increased PTA lactulose  -Continued PTA rifampin  -Consider further discussion with gastroenterology if not improving  -Continued PTA eplerenone  -Unclear if he is still taking furosemide     # Scope cut, left   Patient got a cut around his left eye from a scope while hunting about 2 weeks prior to presentation. Appears to be well healing.     # Hx nephrolithiasis   Is followed in nephrology comprehensive stone clinic.  -Continued PTA bicarbonate    # CKD  Patients Cr appears to be around 1.2 at baseline. 1.29 on presentation.   - BMP with am labs     # Erectile  dysfunction  -Holding PTA sildenafil during admission    # Mild normocytic anemia   Patient appears to be chronically anemic. No signs of bleeding. Further evaluation pending course.  - CBC with am labs     # Thrombocytopenia  Patient's platelets typically around 60. Likely secondary to liver disease. platelets on admission 63.  -CBC with a.m. labs    # Depression   -Continued PTA sertraline        Diet:  NPO for tips ultrasound   DVT Prophylaxis: Pneumatic Compression Devices  Blue Catheter: Not present  Lines: None     Cardiac Monitoring: None  Code Status:  Full code     Clinically Significant Risk Factors Present on Admission          # Hyperchloremia: Highest Cl = 115 mmol/L in last 2 days, will monitor as appropriate          # Hypoalbuminemia: Lowest albumin = 2.7 g/dL at 12/23/2024  7:27 PM, will monitor as appropriate   # Thrombocytopenia: Lowest platelets = 63 in last 2 days, will monitor for bleeding   # Hypertension: Noted on problem list                    Disposition Plan     Medically Ready for Discharge: Anticipated in 2-4 Days       Nash Be MD  Hospitalist Service  [facility]  Securely message with [employer] (more info)  Text page via [employer] Paging/Directory     ______________________________________________________________________    Chief Complaint   Confusion    History is obtained from the patient, ED providers, and chart review    History of Present Illness   Ivan Bell is a 59 year old male with past medical history of alcoholic liver cirrhosis, recurrent hepatic encephalopathy, CKD, depression, gout, kidney stones and hypertension that presented to the emergency department with confusion and concern for recurrence of hepatic encephalopathy.    Reportedly this is the fourth or fifth episode that the patient has had within the last year.  Patient's spouse was concerned that his ammonia was elevated.  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   Throughout the day patient confused about his location and no longer answering any orientation questions correctly.  Patient was also noted to have a scope cut on his left periorbital area muzzle  deer hunting about 2 weeks prior.  Reportedly patient has been sober for 6 years.  ED providers discussed with gastroenterology who felt patient was appropriate for admission at [facility] which is reasonable.    Patient states that he feels well he thinks that he had a fever couple days ago but that it was not sustained.  He denies having any current fevers or chills.  He denies having any abdominal pain, chest pain, palpitations.  He states that his last drink of alcohol was 8 years prior.  He states that he takes his medications and believes she has had 3-4 bowel movements today.  He believes he is still taking diuretics but he is unsure of which ones they are or their current doses.    Past Medical History    Past Medical History:   Diagnosis Date    Alcoholic cirrhosis of liver (H)     Alcoholic hepatitis (H) 03/2019    Chronic kidney disease     CRF    Depression     Gout     History of transfusion     Hypertension     Hypertriglyceridemia     Left calcaneus fracture 01/09/2006 January 16, 2006: Fell 10 feet from ladder onto left foot on frozen ground on 1/9/06 at home.  Immediate pain and unable to walk- seen at [place] and diagnosed with calcaneus fracture    Nephrolithiasis     Osteoarthritis     Portal vein thrombosis     left occlusion, partial main    Thrombocytopenia (H)        Past Surgical History   Past Surgical History:   Procedure Laterality Date    ANKLE SURGERY Left     ANKLE SURGERY      ARTHROPLASTY KNEE Left 9/19/2023    Procedure: LEFT TOTAL KNEE ARTHROPLASTY;  Surgeon: José Miguel Landaverde MD;  Location: [facility] Main OR    ARTHROSCOPY KNEE      COLONOSCOPY N/A 03/31/2016    Procedure: COLONOSCOPY;  Surgeon: Rhys Uriostegui MD;  Location:  GI    COMBINED CYSTOSCOPY,  RETROGRADES, URETEROSCOPY, LASER HOLMIUM LITHOTRIPSY URETER(S), INSERT STENT Bilateral 7/1/2022    Procedure: CYSTOSCOPY, RIGHT RETROGRADE PYELOGRAM, RIGHT URETEROSCOPY WITH LASER LITHOTRIPSY AND BASKET REMOVAL OF STONE, RIGHT URETERAL STENT PLACEMENT, LEFT RETROGRADE PYELOGRAM, LEFT URETEROSCOPY WITH LASER LITHOTRIPSY AND BASKET REMOVAL OF STONE, LEFT URETERAL STENT PLACEMENT;  Surgeon: Dylan Galaviz MD;  Location: [facility] OR    COMBINED CYSTOSCOPY, RETROGRADES, URETEROSCOPY, LASER HOLMIUM LITHOTRIPSY URETER(S), INSERT STENT Bilateral 7/27/2022    Procedure: CYSTOSCOPY, BILATERAL URETERAL STENT REMOVAL, BILATERAL  RETROGRADE PYELOGRAM, BILATERAL URETEROSCOPY. HOLMIUM LASER LITHOTRIPSY LEFT SIDE.  AND BASKET REMOVAL OF STONES BILATERAL, LEFT  URETERAL STENT PLACEMENT;  Surgeon: Dylan Galaviz MD;  Location: [facility] OR    COMBINED CYSTOSCOPY, RETROGRADES, URETEROSCOPY, LASER HOLMIUM LITHOTRIPSY URETER(S), INSERT STENT Left 4/24/2023    Procedure: CYSTOSCOPY, LEFT RETROGRADE PYELOGRAM, LEFT URETEROSCOPY WITH LASER LITHOTRIOPSY AND BASKET REMOVAL OF STONES, LEFT URETERAL STENT PLACEMENT;  Surgeon: Dylan Galaviz MD;  Location:  OR    COMBINED CYSTOSCOPY, RETROGRADES, URETEROSCOPY, LASER HOLMIUM LITHOTRIPSY URETER(S), INSERT STENT Left 5/10/2023    Procedure: CYSTOSCOPY, LEFT URETERAL STENT REMOVAL, LEFT RETROGRADE PYELOGRAM, LEFT URETEROSCOPY WITH LASER LITHOTRIOPSY AND BASKET REMOVAL OF STONES, LEFT URETERAL STENT PLACEMENT;  Surgeon: Dylan Galaviz MD;  Location:  OR    ESOPHAGOSCOPY, GASTROSCOPY, DUODENOSCOPY (EGD), COMBINED N/A 03/31/2016    Procedure: COMBINED ESOPHAGOSCOPY, GASTROSCOPY, DUODENOSCOPY (EGD);  Surgeon: Rhys Uriostegui MD;  Location:  GI    ESOPHAGOSCOPY, GASTROSCOPY, DUODENOSCOPY (EGD), COMBINED N/A 03/09/2018    Procedure: COMBINED ESOPHAGOSCOPY, GASTROSCOPY, DUODENOSCOPY (EGD), BIOPSY SINGLE OR MULTIPLE;  EGD;  Surgeon: Gonzalo Wahl MD;  Location: [facility] GI    ESOPHAGOSCOPY,  GASTROSCOPY, DUODENOSCOPY (EGD), COMBINED N/A 06/07/2019    Procedure: ESOPHAGOGASTRODUODENOSCOPY (EGD);  Surgeon: Gonzalo Wahl MD;  Location:  GI    FOOT SURGERY      IR PARACENTESIS  10/29/2019    IR PARACENTESIS  11/25/2020    IR PARACENTESIS  08/11/2021    IR PARACENTESIS  01/28/2022    IR PARACENTESIS  03/30/2022    IR PARACENTESIS  7/23/2021    IR PARACENTESIS  8/5/2021    IR PARACENTESIS  4/27/2022    IR TRANSVEN INTRAHEPATIC PORTOSYST REV  10/29/2019    IR TRANSVEN INTRAHEPATIC PORTOSYST REV  11/25/2020    IR TRANSVEN INTRAHEPATIC PORTOSYST REV  08/11/2021    IR TRANSVEN INTRAHEPATIC PORTOSYST REV  01/28/2022    IR TRANSVEN INTRAHEPATIC PORTOSYST REV  03/30/2022    KNEE SURGERY Left     KNEE SURGERY Right     RELEASE CARPAL TUNNEL      RELEASE TRIGGER FINGER Right 06/11/2020    Procedure: RELEASE, TRIGGER FINGER, right ring and long finger;  Surgeon: Pascual Vaelncia MD;  Location: [facility] OR    SIGMOIDOSCOPY FLEXIBLE N/A 10/31/2017    Procedure: SIGMOIDOSCOPY FLEXIBLE;;  Surgeon: Armaan Adams MD;  Location:  GI    TIPS Procedure  06/06/2018    TIPS PROCEDURE  11/01/2020    ZZC PLASTY KNEE,MED OR LAT COMPARTMT Right 02/19/2021    Procedure: RIGHT UNICOMPARTMENTAL ARTHROPLASTY KNEE MEDIAL;  Surgeon: José Miguel Landaverde MD;  Location: [facility];  Service: Orthopedics       Prior to Admission Medications   Prior to Admission Medications   Prescriptions Last Dose Informant Patient Reported? Taking?   Ascorbic Acid (VITAMIN C PO)  Self Yes No   Sig: Take 500 mg by mouth daily   MULTIPLE VITAMINS PO  Self Yes No   Sig: Take 1 tablet by mouth daily   acetaminophen (TYLENOL) 500 MG tablet  Self Yes No   Sig: Take 1,000 mg by mouth every 6 hours as needed for mild pain    eplerenone (INSPRA) 50 MG tablet   Yes No   Sig: Take 50 mg by mouth daily   lactulose (CHRONULAC) 10 GM/15ML solution   No Yes   Sig: Take 30 mLs (20 g) by mouth 3 times daily TAKE TO MAINTAIN 3 TO 4 BOWEL MOVEMENTS DAILY    potassium chloride maira ER (KLOR-CON M20) 20 MEQ CR tablet   No No   Sig: Take 1 tablet (20 mEq) by mouth daily.   rifaximin (XIFAXAN) 550 MG TABS tablet   No No   Sig: Take 1 tablet (550 mg) by mouth 2 times daily.   sertraline (ZOLOFT) 25 MG tablet   No No   Sig: Take 1 tablet (25 mg) by mouth daily   sildenafil (REVATIO) 20 MG tablet   No No   Sig: Take 3-5 tabs 1/2-4 hours to relations   sodium bicarbonate 650 MG tablet   No No   Sig: TAKE ONE TABLET BY MOUTH TWICE A DAY   vitamin D3 (CHOLECALCIFEROL) 2000 units (50 mcg) tablet  Self Yes No   Sig: Take 1 tablet by mouth daily      Facility-Administered Medications: None      ------------------------------------------------------------------------     Physical Exam   Vital Signs: Temp: 97.6  F (36.4  C) Temp src: Oral BP: (!) 147/72 Pulse: 65   Resp: 20 SpO2: 99 % O2 Device: None (Room air)    Weight: 170 lbs 0 oz    General Appearance: Awake and alert, laying back in bed, no acute distress  Respiratory: Breathing easily on room air, lungs clear to auscultation bilaterally  Cardiovascular: Regular rate and rhythm, extremities warm and well-perfused  GI: Abdomen soft, nontender, nondistended  Skin: Healing lesion around left eye  Other: Appropriate mood and affect, states the month is April but knows that he is at [facility] in [facility], is unsure of why he is here    Medical Decision Making       55 MINUTES SPENT BY ME on the date of service doing chart review, history, exam, documentation & further activities per the note.      Data     I have personally reviewed the following data over the past 24 hrs:    4.5  \   12.3 (L)   / 63 (L)     143 115 (H) 10.9 /  158 (H)   3.7 22 1.29 (H) \     ALT: 9 AST: 28 AP: 166 (H) TBILI: 2.2 (H)   ALB: 2.7 (L) TOT PROTEIN: 5.0 (L) LIPASE: 79 (H)     TSH: 4.79 (H) T4: 0.71 (L) A1C: N/A       Imaging results reviewed over the past 24 hrs:   Recent Results (from the past 24 hours)   CT Head w/o Contrast     Narrative    EXAM: CT HEAD W/O CONTRAST  LOCATION: [facility]  DATE: 12/23/2024    INDICATION: Headache, closed head injury, altered mental status, history of hepatic encephalopathy  COMPARISON: 11/28/2023  TECHNIQUE: Routine CT Head without IV contrast. Multiplanar reformats. Dose reduction techniques were used.    FINDINGS:  INTRACRANIAL CONTENTS: No intracranial hemorrhage, extraaxial collection, or mass effect.  No CT evidence of acute infarct. Mild presumed chronic small vessel ischemic changes. There is intracranial atherosclerosis. Mild generalized volume loss. No   hydrocephalus.     VISUALIZED ORBITS/SINUSES/MASTOIDS: No intraorbital abnormality. No paranasal sinus mucosal disease. No middle ear or mastoid effusion.    BONES/SOFT TISSUES: No acute abnormality.      Impression    IMPRESSION:  1.  No acute intracranial process.

## 2024-12-24 NOTE — PLAN OF CARE
"  Problem: Adult Inpatient Plan of Care  Goal: Plan of Care Review  Description: The Plan of Care Review/Shift note should be completed every shift.  The Outcome Evaluation is a brief statement about your assessment that the patient is improving, declining, or no change.  This information will be displayed automatically on your shift  note.  Outcome: Progressing  Goal: Patient-Specific Goal (Individualized)  Description: You can add care plan individualizations to a care plan. Examples of Individualization might be:  \"Parent requests to be called daily at 9am for status\", \"I have a hard time hearing out of my right ear\", or \"Do not touch me to wake me up as it startles  me\".  Outcome: Progressing  Goal: Absence of Hospital-Acquired Illness or Injury  Outcome: Progressing  Intervention: Identify and Manage Fall Risk  Recent Flowsheet Documentation  Taken 12/24/2024 1000 by Ángel Villavicencio RN  Safety Promotion/Fall Prevention: activity supervised  Intervention: Prevent Skin Injury  Recent Flowsheet Documentation  Taken 12/24/2024 1000 by Ángel Villavicencio RN  Body Position: position changed independently  Goal: Optimal Comfort and Wellbeing  Outcome: Progressing  Goal: Readiness for Transition of Care  Outcome: Progressing     Problem: Fall Injury Risk  Goal: Absence of Fall and Fall-Related Injury  Outcome: Progressing  Intervention: Identify and Manage Contributors  Recent Flowsheet Documentation  Taken 12/24/2024 1000 by Ángel Villavicencio RN  Medication Review/Management: medications reviewed  Intervention: Promote Injury-Free Environment  Recent Flowsheet Documentation  Taken 12/24/2024 1000 by Ángel Villavicencio RN  Safety Promotion/Fall Prevention: activity supervised   Goal Outcome Evaluation:         Patient was disoriented to the year today, he knew the day of week and the month.  Patient was otherwise oriented.  Patient becomes confused when trying to track conversation and instructions  Patient forgets to call " appropriately and does not always know the details of his situation  Patient is fairly steady on feet  Is now on regular diet  Jan Neal RN [facility]

## 2024-12-25 LAB
ALBUMIN SERPL BCG-MCNC: 2.2 G/DL (ref 3.5–5.2)
ANION GAP SERPL CALCULATED.3IONS-SCNC: 6 MMOL/L (ref 7–15)
BASOPHILS # BLD AUTO: 0 10E3/UL (ref 0–0.2)
BASOPHILS NFR BLD AUTO: 1 %
BUN SERPL-MCNC: 12.4 MG/DL (ref 8–23)
CALCIUM SERPL-MCNC: 8.2 MG/DL (ref 8.8–10.4)
CHLORIDE SERPL-SCNC: 116 MMOL/L (ref 98–107)
CREAT SERPL-MCNC: 1.25 MG/DL (ref 0.67–1.17)
EGFRCR SERPLBLD CKD-EPI 2021: 66 ML/MIN/1.73M2
EOSINOPHIL # BLD AUTO: 0.6 10E3/UL (ref 0–0.7)
EOSINOPHIL NFR BLD AUTO: 14 %
ERYTHROCYTE [DISTWIDTH] IN BLOOD BY AUTOMATED COUNT: 16.6 % (ref 10–15)
GLUCOSE SERPL-MCNC: 86 MG/DL (ref 70–99)
HCO3 SERPL-SCNC: 22 MMOL/L (ref 22–29)
HCT VFR BLD AUTO: 31.9 % (ref 40–53)
HGB BLD-MCNC: 10.8 G/DL (ref 13.3–17.7)
IMM GRANULOCYTES # BLD: 0 10E3/UL
IMM GRANULOCYTES NFR BLD: 0 %
LYMPHOCYTES # BLD AUTO: 1.3 10E3/UL (ref 0.8–5.3)
LYMPHOCYTES NFR BLD AUTO: 27 %
MCH RBC QN AUTO: 31.8 PG (ref 26.5–33)
MCHC RBC AUTO-ENTMCNC: 33.9 G/DL (ref 31.5–36.5)
MCV RBC AUTO: 94 FL (ref 78–100)
MONOCYTES # BLD AUTO: 0.3 10E3/UL (ref 0–1.3)
MONOCYTES NFR BLD AUTO: 7 %
NEUTROPHILS # BLD AUTO: 2.4 10E3/UL (ref 1.6–8.3)
NEUTROPHILS NFR BLD AUTO: 52 %
NRBC # BLD AUTO: 0 10E3/UL
NRBC BLD AUTO-RTO: 0 /100
PHOSPHATE SERPL-MCNC: 2.7 MG/DL (ref 2.5–4.5)
PLATELET # BLD AUTO: 60 10E3/UL (ref 150–450)
POTASSIUM SERPL-SCNC: 3.8 MMOL/L (ref 3.4–5.3)
RBC # BLD AUTO: 3.4 10E6/UL (ref 4.4–5.9)
SODIUM SERPL-SCNC: 144 MMOL/L (ref 135–145)
WBC # BLD AUTO: 4.7 10E3/UL (ref 4–11)

## 2024-12-25 PROCEDURE — 250N000013 HC RX MED GY IP 250 OP 250 PS 637: Performed by: STUDENT IN AN ORGANIZED HEALTH CARE EDUCATION/TRAINING PROGRAM

## 2024-12-25 PROCEDURE — 82565 ASSAY OF CREATININE: CPT | Performed by: STUDENT IN AN ORGANIZED HEALTH CARE EDUCATION/TRAINING PROGRAM

## 2024-12-25 PROCEDURE — 250N000011 HC RX IP 250 OP 636: Performed by: STUDENT IN AN ORGANIZED HEALTH CARE EDUCATION/TRAINING PROGRAM

## 2024-12-25 PROCEDURE — 36415 COLL VENOUS BLD VENIPUNCTURE: CPT | Performed by: STUDENT IN AN ORGANIZED HEALTH CARE EDUCATION/TRAINING PROGRAM

## 2024-12-25 PROCEDURE — 85048 AUTOMATED LEUKOCYTE COUNT: CPT | Performed by: STUDENT IN AN ORGANIZED HEALTH CARE EDUCATION/TRAINING PROGRAM

## 2024-12-25 PROCEDURE — 85004 AUTOMATED DIFF WBC COUNT: CPT | Performed by: STUDENT IN AN ORGANIZED HEALTH CARE EDUCATION/TRAINING PROGRAM

## 2024-12-25 PROCEDURE — 120N000001 HC R&B MED SURG/OB

## 2024-12-25 PROCEDURE — 99232 SBSQ HOSP IP/OBS MODERATE 35: CPT | Performed by: STUDENT IN AN ORGANIZED HEALTH CARE EDUCATION/TRAINING PROGRAM

## 2024-12-25 PROCEDURE — 82310 ASSAY OF CALCIUM: CPT | Performed by: STUDENT IN AN ORGANIZED HEALTH CARE EDUCATION/TRAINING PROGRAM

## 2024-12-25 RX ORDER — CEFTRIAXONE 1 G/1
1 INJECTION, POWDER, FOR SOLUTION INTRAMUSCULAR; INTRAVENOUS EVERY 24 HOURS
Status: DISCONTINUED | OUTPATIENT
Start: 2024-12-25 | End: 2024-12-26

## 2024-12-25 RX ORDER — EPLERENONE 25 MG/1
50 TABLET, FILM COATED ORAL 2 TIMES DAILY
Status: DISCONTINUED | OUTPATIENT
Start: 2024-12-25 | End: 2024-12-26 | Stop reason: HOSPADM

## 2024-12-25 RX ORDER — POLYETHYLENE GLYCOL 3350 17 G/17G
17 POWDER, FOR SOLUTION ORAL 2 TIMES DAILY
Status: DISCONTINUED | OUTPATIENT
Start: 2024-12-26 | End: 2024-12-26

## 2024-12-25 RX ORDER — LACTULOSE 10 G/15ML
20 SOLUTION ORAL 3 TIMES DAILY
Status: DISCONTINUED | OUTPATIENT
Start: 2024-12-26 | End: 2024-12-26 | Stop reason: HOSPADM

## 2024-12-25 RX ADMIN — RIFAXIMIN 550 MG: 550 TABLET ORAL at 08:33

## 2024-12-25 RX ADMIN — SERTRALINE HYDROCHLORIDE 25 MG: 25 TABLET ORAL at 08:33

## 2024-12-25 RX ADMIN — RIFAXIMIN 550 MG: 550 TABLET ORAL at 19:41

## 2024-12-25 RX ADMIN — Medication 220 MG: at 08:33

## 2024-12-25 RX ADMIN — CEFTRIAXONE SODIUM 1 G: 1 INJECTION, POWDER, FOR SOLUTION INTRAMUSCULAR; INTRAVENOUS at 09:10

## 2024-12-25 RX ADMIN — LACTULOSE 20 G: 20 SOLUTION ORAL at 08:31

## 2024-12-25 RX ADMIN — SODIUM BICARBONATE 650 MG: 650 TABLET ORAL at 08:33

## 2024-12-25 RX ADMIN — POTASSIUM CHLORIDE 10 MEQ: 750 TABLET, FILM COATED, EXTENDED RELEASE ORAL at 19:41

## 2024-12-25 RX ADMIN — SODIUM BICARBONATE 650 MG: 650 TABLET ORAL at 11:49

## 2024-12-25 RX ADMIN — EPLERENONE 50 MG: 25 TABLET, FILM COATED ORAL at 08:33

## 2024-12-25 RX ADMIN — POTASSIUM CHLORIDE 10 MEQ: 750 TABLET, FILM COATED, EXTENDED RELEASE ORAL at 08:33

## 2024-12-25 RX ADMIN — SODIUM BICARBONATE 650 MG: 650 TABLET ORAL at 21:52

## 2024-12-25 RX ADMIN — EPLERENONE 50 MG: 25 TABLET, FILM COATED ORAL at 19:41

## 2024-12-25 RX ADMIN — POLYETHYLENE GLYCOL 3350 17 G: 17 POWDER, FOR SOLUTION ORAL at 08:29

## 2024-12-25 RX ADMIN — SODIUM BICARBONATE 650 MG: 650 TABLET ORAL at 17:05

## 2024-12-25 RX ADMIN — POTASSIUM CHLORIDE 10 MEQ: 750 TABLET, FILM COATED, EXTENDED RELEASE ORAL at 13:43

## 2024-12-25 NOTE — PLAN OF CARE
"  Problem: Adult Inpatient Plan of Care  Goal: Plan of Care Review  Description: The Plan of Care Review/Shift note should be completed every shift.  The Outcome Evaluation is a brief statement about your assessment that the patient is improving, declining, or no change.  This information will be displayed automatically on your shift  note.  Outcome: Progressing  Goal: Patient-Specific Goal (Individualized)  Description: You can add care plan individualizations to a care plan. Examples of Individualization might be:  \"Parent requests to be called daily at 9am for status\", \"I have a hard time hearing out of my right ear\", or \"Do not touch me to wake me up as it startles  me\".  Outcome: Progressing  Goal: Absence of Hospital-Acquired Illness or Injury  Outcome: Progressing  Intervention: Identify and Manage Fall Risk  Recent Flowsheet Documentation  Taken 12/25/2024 1200 by Ángel Villavicencio RN  Safety Promotion/Fall Prevention: activity supervised  Intervention: Prevent Skin Injury  Recent Flowsheet Documentation  Taken 12/25/2024 1200 by Ángel Villavicencio RN  Body Position: position changed independently  Goal: Optimal Comfort and Wellbeing  Outcome: Progressing  Goal: Readiness for Transition of Care  Outcome: Progressing     Problem: Fall Injury Risk  Goal: Absence of Fall and Fall-Related Injury  Outcome: Progressing  Intervention: Promote Injury-Free Environment  Recent Flowsheet Documentation  Taken 12/25/2024 1200 by Ángel Villavicencio RN  Safety Promotion/Fall Prevention: activity supervised   Goal Outcome Evaluation:     A&Ox4  Patient can still be a little confused at times but is answering orientation questions correctly, is independent in the room, playing cards with family etc...  Patient has had multiple loose watery stools today  Strict Is and Os tracked  New IV placed in left forearm   Pleasant calm tanner Neal RN [facility]                     "

## 2024-12-25 NOTE — PROGRESS NOTES
[facility]    Medicine Progress Note - Hospitalist Service    Date of Admission:  12/23/2024    Assessment & Plan   Ivan Bell is a 59 year old male with past medical history of alcoholic liver cirrhosis, recurrent hepatic encephalopathy, CKD, depression, gout, kidney stones and hypertension that presented to the emergency department with confusion and concern for recurrence of hepatic encephalopathy and admitted on 12/23/2024 for hepatic encephalopathy.     12/25/24 Update: Patient's mentation is clearing but he still showing signs of encephalopathy with asterixis and has had 4 loose bowel movements today so have timed lactulose and MiraLAX next doses to resume tomorrow 1226.  Spoke with patient and wife at bedside for some time today discussing patient's course since he has had his TIPS and revisions for TIPS.  They state that patient was doing really well until about 5 months ago and then seen that approximately every month the patient develops an acute confusional state that triggers the wife into giving him more lactulose.  Both the patient and wife state that they were told to take lactulose just once a day and to have a goal of 1 bowel movement per day.  Suspect patient is being underdosed with lactulose chronically and needs to have 3-4 loose bowel movements a day.  Additionally patient's current hepatic encephalopathy episode could be from a UTI but it is unlikely that he has been having a recurrent UTI every month although not impossible.  Spent some time educating patient and wife about some of the early stages of hepatic encephalopathy such as alteration of sleep-wake cycle and they state that they had noticed that prior to each of the episodes that he had been occurring each month.  Dispo: Expect 1 more day  Referral: Reestablish with hepatology (has not seen the doctor since his prior hepatologist moved practice)  Lactulose: Would recommend titrating for 3 loose bowel  movements per day, could start with taking lactulose twice a day although patient and wife are agreeable to taking them or if that is what is recommended.    # Hepatic encephalopathy  # Alcoholic cirrhosis with hs of ascites s/p TIPS with multiple revisions   # Hx variceal bleed 10/2017  Patient reported to this admission with confusion and his wife raise concern that he had recurrence of hepatic encephalopathy. CT head without acute findings and ammonia elevated consistent with hepatic encephalopathy patient has had several episodes of hepatic encephalopathy within the last year and has a history of TIPS.  ED providers discussed with Dr. Be of hepatology who was in agreement with admission at [facility]. Increasing his lactulose, and obtaining an ultrasound of his TIPS with Doppler. Unclear cause of patient's worsening encephalopathy.  He has been sober from alcohol for 8 years. MELD is stable at 18. Last gastroenterology clinic appointment 10/09/2024  US TIPS Doppler: no significant change other than slight change in some velocities.     - Increased PTA lactulose, titrate for 3-4 loose BM per day  - Continued PTA rifampin  - Added miralax BID 12/24  - Added zinc sulfate 12/24  - Consider further discussion with gastroenterology if not improving  - Continued PTA eplerenone    #UTI  May be causing current HE episode  UA: pyuria, LE, bacteria  Prior urine cx all without growth of pathogenic bacteria    Ucx pending  1g IV rocephin q24    # Facial laceration, left  Patient got a cut around his left eye from a scope while hunting about 2 weeks prior to presentation. Appears to be well healing.     # Hx nephrolithiasis   Is followed in nephrology comprehensive stone clinic.  -Continued PTA bicarbonate    # CKD  Patients Cr appears to be around 1.2 at baseline. 1.29 on presentation.   - BMP with am labs     # Erectile dysfunction  # Gynecomastia  -Holding PTA sildenafil during admission    # Mild normocytic  anemia   Patient appears to be chronically anemic. No signs of bleeding. Further evaluation pending course.  - CBC with am labs     # Thrombocytopenia  Patient's platelets typically around 60. Likely secondary to liver disease. platelets on admission 63.  -CBC with a.m. labs    # Depression   -Continued PTA sertraline          Diet: Regular Diet Adult    DVT Prophylaxis: Pneumatic Compression Devices/plt <100k  Blue Catheter: Not present  Lines: None     Cardiac Monitoring: None  Code Status: Full Code      Clinically Significant Risk Factors          # Hyperchloremia: Highest Cl = 118 mmol/L in last 2 days, will monitor as appropriate          # Hypoalbuminemia: Lowest albumin = 2.2 g/dL at 12/25/2024  6:03 AM, will monitor as appropriate   # Thrombocytopenia: Lowest platelets = 60 in last 2 days, will monitor for bleeding   # Hypertension: Noted on problem list                       Social Drivers of Health    Tobacco Use: Medium Risk (10/9/2024)    Patient History     Smoking Tobacco Use: Former     Smokeless Tobacco Use: Former     Passive Exposure: Never          Disposition Plan     Medically Ready for Discharge: Anticipated Tomorrow             Harmeet Palmer DO  Hospitalist Service  [facility]  Securely message with [employer] (more info)  Text page via [employer] Paging/Directory   ______________________________________________________________________    Interval History   Naeo, improving    Physical Exam   Vital Signs: Temp: 97.9  F (36.6  C) Temp src: Oral BP: (!) 153/66 Pulse: 63   Resp: 16 SpO2: 99 % O2 Device: None (Room air)    Weight: 169 lbs 15.59 oz    Physical Exam  Constitutional:       General: Pt is not in acute distress.  HENT:      Head: Normocephalic and atraumatic.      Nose: Nose normal.   Eyes:      Conjunctiva/sclera: Conjunctivae normal.   Pulmonary:      Effort: Pulmonary effort is normal.   Abdominal:      General: Abdomen is flat.   Skin:     Findings: No rash.    Neurological:      General: No focal deficit present. +milkmaid /asterixis     Mental Status: Pt is alert.   Psychiatric:         Mood and Affect: Mood normal.       Medical Decision Making       45 MINUTES SPENT BY ME on the date of service doing chart review, history, exam, documentation & further activities per the note.      Data     I have personally reviewed the following data over the past 24 hrs:    4.7  \   10.8 (L)   / 60 (L)     144 116 (H) 12.4 /  86   3.8 22 1.25 (H) \     ALT: N/A AST: N/A AP: N/A TBILI: N/A   ALB: 2.2 (L) TOT PROTEIN: N/A LIPASE: N/A       Imaging results reviewed over the past 24 hrs:   No results found for this or any previous visit (from the past 24 hours).

## 2024-12-25 NOTE — PLAN OF CARE
"  Problem: Adult Inpatient Plan of Care  Goal: Plan of Care Review  Outcome: Progressing  Flowsheets (Taken 12/24/2024 2338)  Plan of Care Reviewed With: patient  Overall Patient Progress: improving  Goal: Patient-Specific Goal (Individualized)  Outcome: Progressing  Flowsheets (Taken 12/24/2024 2338)  Patient/Family-Specific Goals (Include Timeframe): \"i should go home tomorrow\"  Goal: Optimal Comfort and Wellbeing  Outcome: Progressing     Problem: Fall Injury Risk  Goal: Absence of Fall and Fall-Related Injury  Outcome: Progressing  Intervention: Promote Injury-Free Environment  Recent Flowsheet Documentation  Taken 12/24/2024 2314 by Luz Marina Conn RN  Safety Promotion/Fall Prevention:   activity supervised   room near nurse's station   safety round/check completed   Goal Outcome Evaluation:      Plan of Care Reviewed With: patient    Overall Patient Progress: improvingOverall Patient Progress: improving     Patient is A&O, has been up independently in room.  When asked about bowel movements pt states, \"just the right amount\"  Patient is taking in food and fluids without difficulty.  Pt appears to have slept well, wakes easily.  Call light is in reach.       "

## 2024-12-26 VITALS
OXYGEN SATURATION: 97 % | DIASTOLIC BLOOD PRESSURE: 67 MMHG | WEIGHT: 169.97 LBS | TEMPERATURE: 98.1 F | SYSTOLIC BLOOD PRESSURE: 148 MMHG | RESPIRATION RATE: 18 BRPM | HEIGHT: 70 IN | BODY MASS INDEX: 24.33 KG/M2 | HEART RATE: 66 BPM

## 2024-12-26 LAB
ALBUMIN SERPL BCG-MCNC: 2.2 G/DL (ref 3.5–5.2)
ALP SERPL-CCNC: 117 U/L (ref 40–150)
ALT SERPL W P-5'-P-CCNC: 8 U/L (ref 0–70)
ANION GAP SERPL CALCULATED.3IONS-SCNC: 7 MMOL/L (ref 7–15)
AST SERPL W P-5'-P-CCNC: 25 U/L (ref 0–45)
BACTERIA BLD CULT: NORMAL
BACTERIA BLD CULT: NORMAL
BACTERIA UR CULT: ABNORMAL
BACTERIA UR CULT: ABNORMAL
BASOPHILS # BLD AUTO: 0 10E3/UL (ref 0–0.2)
BASOPHILS NFR BLD AUTO: 1 %
BILIRUB DIRECT SERPL-MCNC: 0.7 MG/DL (ref 0–0.3)
BILIRUB SERPL-MCNC: 1.9 MG/DL
BUN SERPL-MCNC: 12.6 MG/DL (ref 8–23)
CALCIUM SERPL-MCNC: 8 MG/DL (ref 8.8–10.4)
CHLORIDE SERPL-SCNC: 114 MMOL/L (ref 98–107)
CREAT SERPL-MCNC: 1.27 MG/DL (ref 0.67–1.17)
EGFRCR SERPLBLD CKD-EPI 2021: 64 ML/MIN/1.73M2
EOSINOPHIL # BLD AUTO: 0.6 10E3/UL (ref 0–0.7)
EOSINOPHIL NFR BLD AUTO: 13 %
ERYTHROCYTE [DISTWIDTH] IN BLOOD BY AUTOMATED COUNT: 16.2 % (ref 10–15)
GLUCOSE SERPL-MCNC: 83 MG/DL (ref 70–99)
HCO3 SERPL-SCNC: 20 MMOL/L (ref 22–29)
HCT VFR BLD AUTO: 32.5 % (ref 40–53)
HGB BLD-MCNC: 10.7 G/DL (ref 13.3–17.7)
IMM GRANULOCYTES # BLD: 0 10E3/UL
IMM GRANULOCYTES NFR BLD: 0 %
LABORATORY REPORT: NORMAL
LYMPHOCYTES # BLD AUTO: 1.2 10E3/UL (ref 0.8–5.3)
LYMPHOCYTES NFR BLD AUTO: 29 %
MCH RBC QN AUTO: 31 PG (ref 26.5–33)
MCHC RBC AUTO-ENTMCNC: 32.9 G/DL (ref 31.5–36.5)
MCV RBC AUTO: 94 FL (ref 78–100)
MONOCYTES # BLD AUTO: 0.3 10E3/UL (ref 0–1.3)
MONOCYTES NFR BLD AUTO: 7 %
NEUTROPHILS # BLD AUTO: 2.1 10E3/UL (ref 1.6–8.3)
NEUTROPHILS NFR BLD AUTO: 50 %
NRBC # BLD AUTO: 0 10E3/UL
NRBC BLD AUTO-RTO: 0 /100
PETH INTERPRETATION: NORMAL
PHOSPHATE SERPL-MCNC: 2.6 MG/DL (ref 2.5–4.5)
PLATELET # BLD AUTO: 55 10E3/UL (ref 150–450)
PLPETH BLD-MCNC: <10 NG/ML
POPETH BLD-MCNC: <10 NG/ML
POTASSIUM SERPL-SCNC: 4 MMOL/L (ref 3.4–5.3)
PROT SERPL-MCNC: 4.2 G/DL (ref 6.4–8.3)
RBC # BLD AUTO: 3.45 10E6/UL (ref 4.4–5.9)
SODIUM SERPL-SCNC: 141 MMOL/L (ref 135–145)
WBC # BLD AUTO: 4.2 10E3/UL (ref 4–11)

## 2024-12-26 PROCEDURE — 250N000013 HC RX MED GY IP 250 OP 250 PS 637: Performed by: STUDENT IN AN ORGANIZED HEALTH CARE EDUCATION/TRAINING PROGRAM

## 2024-12-26 PROCEDURE — 99239 HOSP IP/OBS DSCHRG MGMT >30: CPT | Performed by: STUDENT IN AN ORGANIZED HEALTH CARE EDUCATION/TRAINING PROGRAM

## 2024-12-26 PROCEDURE — 36415 COLL VENOUS BLD VENIPUNCTURE: CPT | Performed by: STUDENT IN AN ORGANIZED HEALTH CARE EDUCATION/TRAINING PROGRAM

## 2024-12-26 PROCEDURE — 85004 AUTOMATED DIFF WBC COUNT: CPT | Performed by: STUDENT IN AN ORGANIZED HEALTH CARE EDUCATION/TRAINING PROGRAM

## 2024-12-26 PROCEDURE — 82248 BILIRUBIN DIRECT: CPT | Performed by: STUDENT IN AN ORGANIZED HEALTH CARE EDUCATION/TRAINING PROGRAM

## 2024-12-26 PROCEDURE — 84100 ASSAY OF PHOSPHORUS: CPT | Performed by: STUDENT IN AN ORGANIZED HEALTH CARE EDUCATION/TRAINING PROGRAM

## 2024-12-26 PROCEDURE — 82310 ASSAY OF CALCIUM: CPT | Performed by: STUDENT IN AN ORGANIZED HEALTH CARE EDUCATION/TRAINING PROGRAM

## 2024-12-26 PROCEDURE — 84460 ALANINE AMINO (ALT) (SGPT): CPT | Performed by: STUDENT IN AN ORGANIZED HEALTH CARE EDUCATION/TRAINING PROGRAM

## 2024-12-26 RX ORDER — POLYETHYLENE GLYCOL 3350 17 G/17G
17 POWDER, FOR SOLUTION ORAL DAILY
Status: DISCONTINUED | OUTPATIENT
Start: 2024-12-27 | End: 2024-12-26 | Stop reason: HOSPADM

## 2024-12-26 RX ORDER — ZINC SULFATE 50(220)MG
220 CAPSULE ORAL DAILY
Qty: 30 CAPSULE | Refills: 0 | Status: SHIPPED | OUTPATIENT
Start: 2024-12-27 | End: 2025-01-26

## 2024-12-26 RX ORDER — AMOXICILLIN 500 MG/1
500 CAPSULE ORAL EVERY 8 HOURS SCHEDULED
Status: DISCONTINUED | OUTPATIENT
Start: 2024-12-26 | End: 2024-12-26 | Stop reason: HOSPADM

## 2024-12-26 RX ORDER — EPLERENONE 50 MG/1
50 TABLET, FILM COATED ORAL 2 TIMES DAILY
Qty: 60 TABLET | Refills: 0 | Status: SHIPPED | OUTPATIENT
Start: 2024-12-26 | End: 2025-01-25

## 2024-12-26 RX ORDER — SODIUM BICARBONATE 650 MG/1
325 TABLET ORAL 2 TIMES DAILY
Qty: 180 TABLET | Refills: 0 | Status: SHIPPED | OUTPATIENT
Start: 2024-12-26

## 2024-12-26 RX ORDER — LACTULOSE 10 G/15ML
20 SOLUTION ORAL 3 TIMES DAILY
Qty: 2700 ML | Refills: 0 | Status: SHIPPED | OUTPATIENT
Start: 2024-12-26

## 2024-12-26 RX ORDER — AMOXICILLIN 500 MG/1
500 CAPSULE ORAL EVERY 8 HOURS
Qty: 20 CAPSULE | Refills: 0 | Status: SHIPPED | OUTPATIENT
Start: 2024-12-26 | End: 2025-01-02

## 2024-12-26 RX ORDER — SODIUM BICARBONATE 650 MG/1
650 TABLET ORAL DAILY
Status: DISCONTINUED | OUTPATIENT
Start: 2024-12-27 | End: 2024-12-26 | Stop reason: HOSPADM

## 2024-12-26 RX ADMIN — POLYETHYLENE GLYCOL 3350 17 G: 17 POWDER, FOR SOLUTION ORAL at 08:33

## 2024-12-26 RX ADMIN — AMOXICILLIN 500 MG: 500 CAPSULE ORAL at 11:57

## 2024-12-26 RX ADMIN — LACTULOSE 20 G: 20 SOLUTION ORAL at 08:33

## 2024-12-26 RX ADMIN — SODIUM BICARBONATE 650 MG: 650 TABLET ORAL at 08:35

## 2024-12-26 RX ADMIN — RIFAXIMIN 550 MG: 550 TABLET ORAL at 08:35

## 2024-12-26 RX ADMIN — EPLERENONE 50 MG: 25 TABLET, FILM COATED ORAL at 08:34

## 2024-12-26 RX ADMIN — Medication 220 MG: at 08:41

## 2024-12-26 RX ADMIN — POTASSIUM CHLORIDE 10 MEQ: 750 TABLET, FILM COATED, EXTENDED RELEASE ORAL at 08:34

## 2024-12-26 RX ADMIN — SERTRALINE HYDROCHLORIDE 25 MG: 25 TABLET ORAL at 08:35

## 2024-12-26 ASSESSMENT — ACTIVITIES OF DAILY LIVING (ADL)
ADLS_ACUITY_SCORE: 35

## 2024-12-26 NOTE — PLAN OF CARE
"  Problem: Adult Inpatient Plan of Care  Goal: Plan of Care Review  Description: The Plan of Care Review/Shift note should be completed every shift.  The Outcome Evaluation is a brief statement about your assessment that the patient is improving, declining, or no change.  This information will be displayed automatically on your shift  note.  Outcome: Progressing  Flowsheets (Taken 12/26/2024 0620)  Outcome Evaluation: Patient is A&O x 3.  IV is saline locked and flushes successfully. He slept all night with no complaints of pain or concerns expressed. He continues to be independent in his room.  Plan of Care Reviewed With: patient  Overall Patient Progress: improving  Goal: Patient-Specific Goal (Individualized)  Description: You can add care plan individualizations to a care plan. Examples of Individualization might be:  \"Parent requests to be called daily at 9am for status\", \"I have a hard time hearing out of my right ear\", or \"Do not touch me to wake me up as it startles  me\".  Outcome: Progressing  Goal: Absence of Hospital-Acquired Illness or Injury  Outcome: Progressing  Intervention: Identify and Manage Fall Risk  Recent Flowsheet Documentation  Taken 12/26/2024 0338 by Alondra Herrera RN  Safety Promotion/Fall Prevention:   room near nurse's station   safety round/check completed  Intervention: Prevent Skin Injury  Recent Flowsheet Documentation  Taken 12/26/2024 0338 by Alondra Herrera RN  Body Position: position changed independently  Goal: Optimal Comfort and Wellbeing  Outcome: Progressing  Goal: Readiness for Transition of Care  Outcome: Progressing     Problem: Fall Injury Risk  Goal: Absence of Fall and Fall-Related Injury  Outcome: Progressing  Intervention: Promote Injury-Free Environment  Recent Flowsheet Documentation  Taken 12/26/2024 0338 by Alondra Herrera RN  Safety Promotion/Fall Prevention:   room near nurse's station   safety round/check completed   Goal Outcome Evaluation:      " Plan of Care Reviewed With: patient    Overall Patient Progress: improvingOverall Patient Progress: improving    Outcome Evaluation: Patient is A&O x 3.  IV is saline locked and flushes successfully. He slept all night with no complaints of pain or concerns expressed. He continues to be independent in his room.

## 2024-12-26 NOTE — DISCHARGE SUMMARY
[facility]  Hospitalist Discharge Summary      Date of Admission:  12/23/2024  Date of Discharge:  12/26/2024  Discharging Provider: Harmeet Palmer DO  Discharge Service: Hospitalist Service    Discharge Diagnoses   Ivan Bell is a 59 year old male with past medical history of alcoholic liver cirrhosis, recurrent hepatic encephalopathy, CKD, depression, gout, kidney stones and hypertension that presented to the emergency department with confusion and concern for recurrence of hepatic encephalopathy and admitted on 12/23/2024 for hepatic encephalopathy.  Suspect that patient's hepatic encephalopathy is multifactorial in the setting of TIPS anatomy, underdosing of lactulose, and ultimately patient was found to have an enterococcal urinary tract infection which likely set this off.  During hospitalization patient and wife repeatedly said they were told by the liver doctor to only take lactulose once a day and it is okay to just have 1 bowel movement per day however given his recent hepatic encephalopathy would recommend patient goes back to 3 times a day and can titrate to target 3-4 loose bowel movements per day.  (Of note I did review most recent hepatology notes and they did recommend taking 38 mL of lactulose 3 times a day to maintain 3-4 bowel movements per day, and I reviewed this with the patient on day of discharge so that he understood how he should be taking his lactulose.) patient was discharged with the recommendation to follow-up with his hepatologist and primary care physician and was discharged with oral antibiotics.    # Hepatic encephalopathy  # Alcoholic cirrhosis with hs of ascites s/p TIPS with multiple revisions   # Hx variceal bleed 10/2017  #Enterococcal UTI  # Facial laceration, left  # Hx nephrolithiasis   # CKD  # Erectile dysfunction  # Gynecomastia  # Mild normocytic anemia   # Thrombocytopenia  # Depression     Clinically Significant Risk Factors           Follow-ups Needed After Discharge   Follow-up Appointments       Follow-up and recommended labs and tests       Follow up with primary care provider, Too [place], within 14 days for hospital follow- up.  The following labs/tests are recommended: CMP in 2 weeks, Repeat UA and Ucx in 2 weeks, CBC 2-4 weeks.                Unresulted Labs Ordered in the Past 30 Days of this Admission       Date and Time Order Name Status Description    12/24/2024  5:57 PM Urine Culture Preliminary     12/23/2024  8:53 PM Blood Culture Arm, Right Preliminary     12/23/2024  8:53 PM Blood Culture Peripheral Blood Preliminary         These results will be followed up by Medicine team/Myself, PCP, or hepatology    Discharge Disposition   Discharged to home  Condition at discharge: Fair    Hospital Course   Ivan Bell is a 59 year old male with past medical history of alcoholic liver cirrhosis, recurrent hepatic encephalopathy, CKD, depression, gout, kidney stones and hypertension that presented to the emergency department with confusion and concern for recurrence of hepatic encephalopathy and admitted on 12/23/2024 for hepatic encephalopathy.  Suspect that patient's hepatic encephalopathy is multifactorial in the setting of TIPS anatomy, underdosing of lactulose, and ultimately patient was found to have an enterococcal urinary tract infection which likely set this off.  During hospitalization patient and wife repeatedly said they were told by the liver doctor to only take lactulose once a day and it is okay to just have 1 bowel movement per day however given his recent hepatic encephalopathy would recommend patient goes back to 3 times a day and can titrate to target 3-4 loose bowel movements per day.  (Of note I did review most recent hepatology notes and they did recommend taking 38 mL of lactulose 3 times a day to maintain 3-4 bowel movements per day, and I reviewed this with the patient on day of discharge so that he  understood how he should be taking his lactulose.) patient was discharged with the recommendation to follow-up with his hepatologist and primary care physician and was discharged with oral antibiotics.    12/26/24 Update: Ucx - E. Faecalis (antibiogram has high susceptibility for ampicillin) -> amoxicillin 500mg q8h x7 days . HE resolved. Lactulose TID recommended. Discharge home.    # Hepatic encephalopathy  # Alcoholic cirrhosis with hs of ascites s/p TIPS with multiple revisions   # Hx variceal bleed 10/2017  Patient reported to this admission with confusion and his wife raise concern that he had recurrence of hepatic encephalopathy. CT head without acute findings and ammonia elevated consistent with hepatic encephalopathy patient has had several episodes of hepatic encephalopathy within the last year and has a history of TIPS.  ED providers discussed with Dr. Be of hepatology who was in agreement with admission at [facility]. Increasing his lactulose, and obtaining an ultrasound of his TIPS with Doppler. Unclear cause of patient's worsening encephalopathy.  He has been sober from alcohol for 8 years. MELD is stable at 18. Last gastroenterology clinic appointment 10/09/2024  US TIPS Doppler: no significant change other than slight change in some velocities.     - Increased PTA lactulose, titrate for 3-4 loose BM per day  - Continued PTA rifampin  - Added miralax BID 12/24  - Added zinc sulfate 12/24  - Consider further discussion with gastroenterology if not improving  - Continued PTA eplerenone    #UTI  May be causing current HE episode  UA: pyuria, LE, bacteria  Prior urine cx all without growth of pathogenic bacteria    Ucx pending  1g IV rocephin q24    # Facial laceration, left  Patient got a cut around his left eye from a scope while hunting about 2 weeks prior to presentation. Appears to be well healing.     # Hx nephrolithiasis   Is followed in nephrology comprehensive stone clinic.  -Continued  PTA bicarbonate    # CKD  Patients Cr appears to be around 1.2 at baseline. 1.29 on presentation.   - BMP with am labs     # Erectile dysfunction  # Gynecomastia  -Holding PTA sildenafil during admission    # Mild normocytic anemia   Patient appears to be chronically anemic. No signs of bleeding. Further evaluation pending course.  - CBC with am labs     # Thrombocytopenia  Patient's platelets typically around 60. Likely secondary to liver disease. platelets on admission 63.  -CBC with a.m. labs    # Depression   -Continued PTA sertraline    Consultations This Hospital Stay   None    Code Status   Full Code    Time Spent on this Encounter   IHarmeet DO, personally saw the patient today and spent greater than 30 minutes discharging this patient.       Harmeet Palmer DO  [facility]  [address] [address]  Phone: [phone]  Fax: [phone]  ______________________________________________________________________    Physical Exam   Vital Signs: Temp: 98.1  F (36.7  C) Temp src: Oral BP: (!) 148/67 Pulse: 66   Resp: 18 SpO2: 97 % O2 Device: None (Room air)    Weight: 169 lbs 15.59 oz  Physical Exam  Constitutional:       General: Pt is not in acute distress.  HENT:      Head: Normocephalic and atraumatic.      Nose: Nose normal.   Eyes:      Conjunctiva/sclera: Conjunctivae normal.   Pulmonary:      Effort: Pulmonary effort is normal.   Abdominal:      General: Abdomen is flat.   Skin:     Findings: No rash.   Neurological:      General: No focal deficit present.      Mental Status: Pt is alert.   Psychiatric:         Mood and Affect: Mood normal.          Primary Care Physician   Too [place]    Discharge Orders      Reason for your hospital stay    You were hospitalized for confusion (hepatic encephalopathy) which was likely due to a urinary tract infection and underdosing of your lactulose.  As discussed, both myself and your hepatology team recommend that  you take lactulose at your prescribed dose 3 times a day to maintain 3-4 loose bowel movements per day.  Please call your hepatology team to schedule a follow-up appointment given your recurrent hepatic encephalopathy.  You are discharged home with antibiotics for your urinary tract infection.     Follow-up and recommended labs and tests     Follow up with primary care provider, Too [place], within 14 days for hospital follow- up.  The following labs/tests are recommended: CMP in 2 weeks, Repeat UA and Ucx in 2 weeks, CBC 2-4 weeks.     Activity    Your activity upon discharge: activity as tolerated     Diet    Follow this diet upon discharge: Current Diet:Orders Placed This Encounter      Regular Diet Adult       Significant Results and Procedures   Results for orders placed or performed during the hospital encounter of 12/23/24   CT Head w/o Contrast    Narrative    EXAM: CT HEAD W/O CONTRAST  LOCATION: [facility]  DATE: 12/23/2024    INDICATION: Headache, closed head injury, altered mental status, history of hepatic encephalopathy  COMPARISON: 11/28/2023  TECHNIQUE: Routine CT Head without IV contrast. Multiplanar reformats. Dose reduction techniques were used.    FINDINGS:  INTRACRANIAL CONTENTS: No intracranial hemorrhage, extraaxial collection, or mass effect.  No CT evidence of acute infarct. Mild presumed chronic small vessel ischemic changes. There is intracranial atherosclerosis. Mild generalized volume loss. No   hydrocephalus.     VISUALIZED ORBITS/SINUSES/MASTOIDS: No intraorbital abnormality. No paranasal sinus mucosal disease. No middle ear or mastoid effusion.    BONES/SOFT TISSUES: No acute abnormality.      Impression    IMPRESSION:  1.  No acute intracranial process.   US Abdomen Complete with TIPS Doppler    Narrative    US ABDOMEN COMPLETE WITH TIPS DOPPLER 12/24/2024 9:43 AM    CLINICAL HISTORY: Patient with TIPS and hepatic encephalopathy.    TECHNIQUE: Complete  abdominal ultrasound. Color flow with spectral  Doppler and waveform analysis performed.    COMPARISON: Abdominal ultrasounds, most recently 10/9/2024. CT of the  abdomen and pelvis dated 4/12/2023.     FINDINGS:    GALLBLADDER: Gallstones in an otherwise normal gallbladder. No wall  thickening, or pericholecystic fluid. Negative sonographic Zimmer's  sign per the sonographer.     BILE DUCTS: No biliary dilatation. The common duct measures 3 mm.    LIVER: Coarsened echotexture, suggesting underlying fibrosis. Nodular  contour. No focal mass.     RIGHT KIDNEY: Normal size. Normal echogenicity with no hydronephrosis  or mass. Nonobstructive nephrolithiasis, with shadowing stones in the  midpole measuring 15 x 13 mm, 9 x 10 mm and 10 x 12 mm.    LEFT KIDNEY: Normal size. Normal echogenicity with no hydronephrosis  or mass. Nonobstructive nephrolithiasis, with 9 and 10 mm lower pole  stones and an 11 mm mid pole stone.    SPLEEN: Splenomegaly, measuring 15.3 cm in length. Previously, the  spleen measured 15.9 cm.    PANCREAS: The pancreas is largely obscured by overlying gas.    AORTA: Normal in caliber.    IVC: Normal where visualized.    No ascites.    TIPS DUPLEX: Patent TIPS with antegrade flow towards the hepatic vein,  and normal waveforms. The following peak systolic velocities were  obtained:    Proximal to stent: 49 cm/s  Portal vein end of stent: 123 cm/s  Mid stent: 216 cm/s, previously 160 cm/s  Hepatic vein end of stent: 205 cm/s, previously 171 cm/s  Distal to stent: 53 cm/s    The left and right hepatic veins are not well demonstrated. The IVC  and splenic vein are patent with antegrade flow. The hepatic artery is  patent with low resistance monophasic waveforms. Peak systolic  velocity is 67 cm/s.      Impression    IMPRESSION:  1. Cirrhotic appearance of the liver. No focal mass.    2. Splenomegaly, suggestive of portal hypertension. No ascites.    3. Patent TIPS with antegrade flow. Mildly elevated  velocities,  increased from prior. This may represent mild or developing stenosis.  Recommend surveillance.    4. Bilateral nonobstructive nephrolithiasis.    5. Cholelithiasis without sonographic evidence for acute  cholecystitis.    6. The pancreas is obscured by overlying bowel gas.    RIO KUO MD         SYSTEM ID:  [id number]     *Note: Due to a large number of results and/or encounters for the requested time period, some results have not been displayed. A complete set of results can be found in Results Review.       Discharge Medications   Current Discharge Medication List        START taking these medications    Details   amoxicillin (AMOXIL) 500 MG capsule Take 1 capsule (500 mg) by mouth every 8 hours for 7 days.  Qty: 20 capsule, Refills: 0    Associated Diagnoses: UTI (urinary tract infection), bacterial      zinc sulfate (ZINCATE) 220 (50 Zn) MG capsule Take 1 capsule (220 mg) by mouth daily.  Qty: 30 capsule, Refills: 0    Associated Diagnoses: Hepatic encephalopathy (H)           CONTINUE these medications which have CHANGED    Details   eplerenone (INSPRA) 50 MG tablet Take 1 tablet (50 mg) by mouth 2 times daily.  Qty: 60 tablet, Refills: 0    Associated Diagnoses: Hepatic encephalopathy (H)      lactulose (CHRONULAC) 10 GM/15ML solution Take 30 mLs (20 g) by mouth 3 times daily. TAKE TO MAINTAIN 3 TO 4 BOWEL MOVEMENTS DAILY  Qty: 2700 mL, Refills: 0    Comments: Please re-iterate to patient the importance of taking lactulose for maintaining at least 3 bowel movements a day  Associated Diagnoses: Hepatic encephalopathy (H); Alcoholic cirrhosis of liver with ascites (H)      sodium bicarbonate 650 MG tablet Take 0.5 tablets (325 mg) by mouth 2 times daily.  Qty: 180 tablet, Refills: 0    Associated Diagnoses: Renal tubular acidosis           CONTINUE these medications which have NOT CHANGED    Details   acetaminophen (TYLENOL) 500 MG tablet Take 1,000 mg by mouth every 6 hours as needed for mild  pain       Ascorbic Acid (VITAMIN C PO) Take 500 mg by mouth daily      MULTIPLE VITAMINS PO Take 1 tablet by mouth daily      potassium chloride maira ER (KLOR-CON M20) 20 MEQ CR tablet Take 1 tablet (20 mEq) by mouth daily.  Qty: 90 tablet, Refills: 3    Associated Diagnoses: Hypokalemia      rifaximin (XIFAXAN) 550 MG TABS tablet Take 1 tablet (550 mg) by mouth 2 times daily.  Qty: 180 tablet, Refills: 3    Associated Diagnoses: Hepatic encephalopathy (H)      sertraline (ZOLOFT) 25 MG tablet Take 1 tablet (25 mg) by mouth daily  Qty: 90 tablet, Refills: 3    Associated Diagnoses: Alcoholic cirrhosis of liver with ascites (H); Itching      sildenafil (REVATIO) 20 MG tablet Take 3-5 tabs 1/2-4 hours to relations  Qty: 30 tablet, Refills: 1    Associated Diagnoses: Other male erectile dysfunction      vitamin D3 (CHOLECALCIFEROL) 2000 units (50 mcg) tablet Take 1 tablet by mouth daily           Allergies   Allergies   Allergen Reactions    Trazodone Visual Disturbance    Oxycodone Other (See Comments)     Delirium and constipation  Delirium and constipation

## 2024-12-26 NOTE — PLAN OF CARE
YOSVANY SALINAS[facility] DISCHARGE NOTE    Patient discharged to home at 12:01 PM via wheel chair. Accompanied by daughter and staff. Discharge instructions reviewed with patient and daughter, opportunity offered to ask questions. Prescriptions sent to patients preferred pharmacy. All belongings sent with patient.    Lida Garcia, RNGoal Outcome Evaluation:

## 2024-12-26 NOTE — PLAN OF CARE
Problem: Adult Inpatient Plan of Care  Goal: Plan of Care Review  Outcome: Progressing  Flowsheets (Taken 12/25/2024 2221)  Plan of Care Reviewed With: patient  Overall Patient Progress: improving  Goal: Optimal Comfort and Wellbeing  Outcome: Progressing   Goal Outcome Evaluation:      Plan of Care Reviewed With: patient    Overall Patient Progress: improvingOverall Patient Progress: improving  Patient is A&O, pleasant and cooperative.  Pt is independent in room.  Pt sometimes forgets we have requested to be informed when he uses the restroom.  Pt has denies discomfort.  Pt is taking food and fluids without difficulty.

## 2024-12-29 LAB
BACTERIA BLD CULT: NO GROWTH
BACTERIA BLD CULT: NO GROWTH

## 2025-04-08 ENCOUNTER — TRANSFERRED RECORDS (OUTPATIENT)
Dept: HEALTH INFORMATION MANAGEMENT | Facility: CLINIC | Age: 60
End: 2025-04-08
Payer: COMMERCIAL

## 2025-04-09 ENCOUNTER — LAB (OUTPATIENT)
Dept: LAB | Facility: CLINIC | Age: 60
End: 2025-04-09
Payer: COMMERCIAL

## 2025-04-09 DIAGNOSIS — M25.562 KNEE PAIN, LEFT: Primary | ICD-10-CM

## 2025-04-09 DIAGNOSIS — M25.561 KNEE PAIN, RIGHT: ICD-10-CM

## 2025-04-09 LAB
BASOPHILS # BLD AUTO: 0 10E3/UL (ref 0–0.2)
BASOPHILS NFR BLD AUTO: 1 %
CRP SERPL-MCNC: <3 MG/L
EOSINOPHIL # BLD AUTO: 0.3 10E3/UL (ref 0–0.7)
EOSINOPHIL NFR BLD AUTO: 6 %
ERYTHROCYTE [DISTWIDTH] IN BLOOD BY AUTOMATED COUNT: 16 % (ref 10–15)
ERYTHROCYTE [SEDIMENTATION RATE] IN BLOOD BY WESTERGREN METHOD: 9 MM/HR (ref 0–20)
HCT VFR BLD AUTO: 36.3 % (ref 40–53)
HGB BLD-MCNC: 12.6 G/DL (ref 13.3–17.7)
IMM GRANULOCYTES # BLD: 0 10E3/UL
IMM GRANULOCYTES NFR BLD: 0 %
LYMPHOCYTES # BLD AUTO: 0.8 10E3/UL (ref 0.8–5.3)
LYMPHOCYTES NFR BLD AUTO: 20 %
MCH RBC QN AUTO: 32.1 PG (ref 26.5–33)
MCHC RBC AUTO-ENTMCNC: 34.7 G/DL (ref 31.5–36.5)
MCV RBC AUTO: 93 FL (ref 78–100)
MONOCYTES # BLD AUTO: 0.3 10E3/UL (ref 0–1.3)
MONOCYTES NFR BLD AUTO: 8 %
NEUTROPHILS # BLD AUTO: 2.7 10E3/UL (ref 1.6–8.3)
NEUTROPHILS NFR BLD AUTO: 65 %
NRBC # BLD AUTO: 0 10E3/UL
NRBC BLD AUTO-RTO: 0 /100
PLATELET # BLD AUTO: 63 10E3/UL (ref 150–450)
RBC # BLD AUTO: 3.92 10E6/UL (ref 4.4–5.9)
WBC # BLD AUTO: 4.1 10E3/UL (ref 4–11)

## 2025-04-09 PROCEDURE — 86140 C-REACTIVE PROTEIN: CPT

## 2025-04-09 PROCEDURE — 36415 COLL VENOUS BLD VENIPUNCTURE: CPT

## 2025-04-09 PROCEDURE — 85652 RBC SED RATE AUTOMATED: CPT

## 2025-04-09 PROCEDURE — 85025 COMPLETE CBC W/AUTO DIFF WBC: CPT

## 2025-04-14 ENCOUNTER — LAB (OUTPATIENT)
Dept: LAB | Facility: CLINIC | Age: 60
End: 2025-04-14
Payer: COMMERCIAL

## 2025-04-14 ENCOUNTER — OFFICE VISIT (OUTPATIENT)
Dept: GASTROENTEROLOGY | Facility: CLINIC | Age: 60
End: 2025-04-14
Attending: PHYSICIAN ASSISTANT
Payer: COMMERCIAL

## 2025-04-14 VITALS
HEIGHT: 71 IN | BODY MASS INDEX: 25.28 KG/M2 | HEART RATE: 66 BPM | OXYGEN SATURATION: 99 % | RESPIRATION RATE: 16 BRPM | SYSTOLIC BLOOD PRESSURE: 160 MMHG | DIASTOLIC BLOOD PRESSURE: 76 MMHG | WEIGHT: 180.6 LBS

## 2025-04-14 DIAGNOSIS — R06.83 SNORING: ICD-10-CM

## 2025-04-14 DIAGNOSIS — K70.30 ALCOHOLIC CIRRHOSIS OF LIVER WITHOUT ASCITES (H): ICD-10-CM

## 2025-04-14 DIAGNOSIS — K76.82 HEPATIC ENCEPHALOPATHY (H): ICD-10-CM

## 2025-04-14 DIAGNOSIS — N18.2 CKD (CHRONIC KIDNEY DISEASE) STAGE 2, GFR 60-89 ML/MIN: ICD-10-CM

## 2025-04-14 DIAGNOSIS — K70.30 ALCOHOLIC CIRRHOSIS OF LIVER WITHOUT ASCITES (H): Primary | ICD-10-CM

## 2025-04-14 DIAGNOSIS — Z95.828 S/P TIPS (TRANSJUGULAR INTRAHEPATIC PORTOSYSTEMIC SHUNT): ICD-10-CM

## 2025-04-14 DIAGNOSIS — F10.21 ALCOHOL USE DISORDER, MODERATE, IN SUSTAINED REMISSION (H): ICD-10-CM

## 2025-04-14 DIAGNOSIS — N20.0 NEPHROLITHIASIS: ICD-10-CM

## 2025-04-14 LAB
ALBUMIN SERPL BCG-MCNC: 2.8 G/DL (ref 3.5–5.2)
ALP SERPL-CCNC: 114 U/L (ref 40–150)
ALT SERPL W P-5'-P-CCNC: 9 U/L (ref 0–70)
ANION GAP SERPL CALCULATED.3IONS-SCNC: 4 MMOL/L (ref 7–15)
AST SERPL W P-5'-P-CCNC: 27 U/L (ref 0–45)
BILIRUB DIRECT SERPL-MCNC: 1.2 MG/DL (ref 0–0.3)
BILIRUB SERPL-MCNC: 2.4 MG/DL
BUN SERPL-MCNC: 13.9 MG/DL (ref 8–23)
CALCIUM SERPL-MCNC: 9.9 MG/DL (ref 8.8–10.4)
CHLORIDE SERPL-SCNC: 115 MMOL/L (ref 98–107)
CREAT SERPL-MCNC: 1.43 MG/DL (ref 0.67–1.17)
EGFRCR SERPLBLD CKD-EPI 2021: 56 ML/MIN/1.73M2
ERYTHROCYTE [DISTWIDTH] IN BLOOD BY AUTOMATED COUNT: 16 % (ref 10–15)
GLUCOSE SERPL-MCNC: 110 MG/DL (ref 70–99)
HCO3 SERPL-SCNC: 24 MMOL/L (ref 22–29)
HCT VFR BLD AUTO: 35 % (ref 40–53)
HGB BLD-MCNC: 12.1 G/DL (ref 13.3–17.7)
INR PPP: 1.63 (ref 0.85–1.15)
MCH RBC QN AUTO: 32.4 PG (ref 26.5–33)
MCHC RBC AUTO-ENTMCNC: 34.6 G/DL (ref 31.5–36.5)
MCV RBC AUTO: 94 FL (ref 78–100)
PLATELET # BLD AUTO: 56 10E3/UL (ref 150–450)
POTASSIUM SERPL-SCNC: 4.7 MMOL/L (ref 3.4–5.3)
PROT SERPL-MCNC: 5.1 G/DL (ref 6.4–8.3)
RBC # BLD AUTO: 3.74 10E6/UL (ref 4.4–5.9)
SODIUM SERPL-SCNC: 143 MMOL/L (ref 135–145)
TSH SERPL DL<=0.005 MIU/L-ACNC: 1.47 UIU/ML (ref 0.3–4.2)
WBC # BLD AUTO: 3.9 10E3/UL (ref 4–11)

## 2025-04-14 PROCEDURE — 3078F DIAST BP <80 MM HG: CPT | Performed by: PHYSICIAN ASSISTANT

## 2025-04-14 PROCEDURE — 36415 COLL VENOUS BLD VENIPUNCTURE: CPT | Performed by: PATHOLOGY

## 2025-04-14 PROCEDURE — G2211 COMPLEX E/M VISIT ADD ON: HCPCS | Performed by: PHYSICIAN ASSISTANT

## 2025-04-14 PROCEDURE — 3077F SYST BP >= 140 MM HG: CPT | Performed by: PHYSICIAN ASSISTANT

## 2025-04-14 PROCEDURE — 84443 ASSAY THYROID STIM HORMONE: CPT | Performed by: PATHOLOGY

## 2025-04-14 PROCEDURE — 99213 OFFICE O/P EST LOW 20 MIN: CPT | Performed by: PHYSICIAN ASSISTANT

## 2025-04-14 PROCEDURE — 1126F AMNT PAIN NOTED NONE PRSNT: CPT | Performed by: PHYSICIAN ASSISTANT

## 2025-04-14 PROCEDURE — 80053 COMPREHEN METABOLIC PANEL: CPT | Performed by: PATHOLOGY

## 2025-04-14 PROCEDURE — 85027 COMPLETE CBC AUTOMATED: CPT | Performed by: PATHOLOGY

## 2025-04-14 PROCEDURE — 99215 OFFICE O/P EST HI 40 MIN: CPT | Performed by: PHYSICIAN ASSISTANT

## 2025-04-14 PROCEDURE — 85610 PROTHROMBIN TIME: CPT | Performed by: PATHOLOGY

## 2025-04-14 PROCEDURE — 82248 BILIRUBIN DIRECT: CPT | Performed by: PATHOLOGY

## 2025-04-14 RX ORDER — LACTULOSE 10 G/15ML
20 SOLUTION ORAL 3 TIMES DAILY
Qty: 2700 ML | Refills: 0 | Status: SHIPPED | OUTPATIENT
Start: 2025-04-14

## 2025-04-14 RX ORDER — EPLERENONE 25 MG/1
TABLET ORAL DAILY
COMMUNITY

## 2025-04-14 ASSESSMENT — PAIN SCALES - GENERAL: PAINLEVEL_OUTOF10: NO PAIN (0)

## 2025-04-14 NOTE — NURSING NOTE
"Chief Complaint   Patient presents with    Follow Up     Return visit     Naldo Valdez, CMA CMA at 1:24 PM on 4/14/2025     BP (!) 160/76   Pulse 66   Resp 16   Ht 1.803 m (5' 11\")   Wt 81.9 kg (180 lb 9.6 oz)   SpO2 99%   BMI 25.19 kg/m      "

## 2025-04-14 NOTE — PROGRESS NOTES
Hepatology Follow-up Clinic note  Ivan Bell   Date of Birth 1965  Reason for follow-up: Cirrhosis          Assessment/plan:   Ivan Bell is a 59 year old male with  decompensated alcohol-related cirrhosis complicated with ascites s/p TIPS 5/2018 with multiple revisions (last 2022) and hepatic encephalopathy fairly well controlled with medical therapy, although more episodes at the end of last year.  MELD 3.0, driven by Cr 1.43  = 19 (stable).  No urgent indication for liver transplant evaluation, reviewed indications and briefly discussed living donor transplant in the setting of lower MELD.  Continue optimization of nutrition and medical comorbidities to maintain overall health.    # Decompensated cirrhosis with moderate liver dysfunction, MELD 3.0 19 total by creatinine 1.43, T. bili 2.4 direct (1.20), Alb 2.8 and INR 1.63.   - BMP, Hepatic panel, CBC, INR, AFP and Peth in 6 months     # HCC screening: up-to-date:   - US abdomen w/ TIPS doppler pending at the time of visit   - US abdomen w/ TIPS doppler in six months      # Variceal screening, last EGD 2019 up to date:   - Will not need routine EGD with patent TIPS    # Hepatic encephalopathy, controlled currently, more episodes with past year:   - Continue lactulose, (dose to 2-3 BM daily)   - Change Rifaximin 550 mg to twice daily     # Ascites s/p TIPS, resolved:  # Lower extremity edema, controlled with current regimen  - Currently taking epleronone (unclear current dose).  Will have RN CC clarify home dose and adjust diuretics as needed.  - Continue 2000 mg sodium diet   - Continue high-protein diet    # History of nephrolithiasis and CKD, Cr trending up to 1.43.   - Follow up with nephrology, last seen by Dr. Moses in 2023 [facility]    # Snoring, daytime fatigue:   - Rule out sleep apnea contributing to fatigue.  Sleep study placed.  - Repeat TSH today improved    # Continue absolute sobriety from alcohol    - Follow-up in clinic in  six months and establish care with transplant hepatologist and Dr. Bales's departure.     Celestino Verduzco PA-C   [facility] Hepatology clinic    Total time for E/M services performed on the date of the encounter 40 minutes.  This included review of previous: clinic visits, hospital records, lab results, imaging studies, and procedural documentation. Time also includes patient visit, documentation and discussion with other providers.  The findings from this review are summarized in the above note.    The longitudinal plan of care for the diagnosis(es)/condition(s) as documented were addressed during this visit. Due to the added complexity in care, I will continue to support Ivan Bell in the subsequent management and with ongoing continuity of care.  -----------------------------------------------------       HPI:   Ivan Bell is a 59 year old male presenting for follow-up.  He is accompanied by his wife today.    - ETOH  - hx ascites, TIPS placement 5/14/18, 6/6/18; TIPS revision 10/29/19, 11/25/2020, 8/11/2021, 1/28/2022  - hx HE  - hx variceal bleed Oct 2017  - ETOH hepatitis March 2019  - last EGD Jun 2019- diminutive EV, mild portal hypertensive gastropathy  - HCC screening- liver TIPS doppler U/S 10/9/24    Patient was last seen by Dr Bales on  10/9/2024.   Recent hospitalizations or ER visits: Patient was hospitalized in late December for hepatic encephalopathy.  Enterococcal UTI.  He was encouraged to increase dose of lactulose dose to 3 times a day to ensure that he is having 3-4 bMa day.    - Experienced two episodes of confusion back in September, with no emergency department visits.  - Taking lactulose, initially decreased dose last fall, increased again in December due to confusion. having 2-3 bowel movements a day  - Taking rifaximin, taking both tablets once a day.     - Reports constant fatigue, unsure if related to liver condition or potential sleep apnea.  - Snores heavily, no formal  diagnosis of sleep apnea, but was advised to use a CPAP during knee surgery.    - History of knee problems, with surgeries three years ago and last year. Experienced overmedication with Dilaudid during a hospital stay, leading to prolonged sedation.  - Reports swelling in knees and legs, varies with activity.  - Taking eplerenone, with doses of 50 mg twice a day and 25 mg daily on medication list. Unclear current dose.     - Appetite fluctuates, maintains weight around 180 lbs.  - No blood or black stools, no bloating reported.    - Engages in physical activities like splitting wood, maintains strength at this time.  - Avoids alcohol, last consumed eight years ago.    - History of kidney stones, previously removed.  Followed by Dr. Camacho in nephrology.  - Reports hand tremors, especially noticeable when eating.  - Thyroid function was off in December during a hospital admission,          Medications:     Current Outpatient Medications   Medication Sig Dispense Refill    acetaminophen (TYLENOL) 500 MG tablet Take 1,000 mg by mouth every 6 hours as needed for mild pain       Ascorbic Acid (VITAMIN C PO) Take 500 mg by mouth daily      eplerenone (INSPRA) 50 MG tablet Take 1 tablet (50 mg) by mouth 2 times daily. 60 tablet 0    lactulose (CHRONULAC) 10 GM/15ML solution Take 30 mLs (20 g) by mouth 3 times daily. TAKE TO MAINTAIN 3 TO 4 BOWEL MOVEMENTS DAILY 2700 mL 0    MULTIPLE VITAMINS PO Take 1 tablet by mouth daily      potassium chloride maira ER (KLOR-CON M20) 20 MEQ CR tablet Take 1 tablet (20 mEq) by mouth daily. 90 tablet 3    rifaximin (XIFAXAN) 550 MG TABS tablet Take 1 tablet (550 mg) by mouth 2 times daily. 180 tablet 3    sertraline (ZOLOFT) 25 MG tablet Take 1 tablet (25 mg) by mouth daily 90 tablet 3    sildenafil (REVATIO) 20 MG tablet Take 3-5 tabs 1/2-4 hours to relations 30 tablet 1    sodium bicarbonate 650 MG tablet Take 0.5 tablets (325 mg) by mouth 2 times daily. 180 tablet 0    vitamin D3  "(CHOLECALCIFEROL) 2000 units (50 mcg) tablet Take 1 tablet by mouth daily       No current facility-administered medications for this visit.            Review of Systems:   10 points ROS was obtained and highlighted in the HPI, otherwise negative.          Physical Exam:   BP (!) 160/76   Pulse 66   Resp 16   Ht 1.803 m (5' 11\")   Wt 81.9 kg (180 lb 9.6 oz)   SpO2 99%   BMI 25.19 kg/m      Gen: A&Ox3, NAD, thin, temporal muscle wasting  HEENT: non-icteric   CV: RRR, no overt murmurs  Lung: CTA Bilatererally, no wheezing or crackles.   Lym- no palpable lymphadenopathy  Abd: soft, NT, ND, no organomegaly.   Ext: mild bilateral pitting   Skin: No rash, no palmar erythema, telangiectasias or jaundice  Neuro: grossly intact, no asterixis   Psych: appropriate mood and affects         Data:   Reviewed in person and significant for:    Lab Results   Component Value Date     12/26/2024     06/23/2021      Lab Results   Component Value Date    POTASSIUM 4.0 12/26/2024    POTASSIUM 3.9 08/22/2022    POTASSIUM 4.0 06/23/2021     Lab Results   Component Value Date    CHLORIDE 114 12/26/2024    CHLORIDE 116 08/22/2022    CHLORIDE 117 06/23/2021     Lab Results   Component Value Date    CO2 20 12/26/2024    CO2 23 08/22/2022    CO2 25 06/23/2021     Lab Results   Component Value Date    BUN 12.6 12/26/2024    BUN 12 08/22/2022    BUN 8 06/23/2021     Lab Results   Component Value Date    CR 1.27 12/26/2024    CR 1.09 06/23/2021       Lab Results   Component Value Date    WBC 4.1 04/09/2025    WBC 4.3 06/23/2021     Lab Results   Component Value Date    HGB 12.6 04/09/2025    HGB 12.1 06/23/2021     Lab Results   Component Value Date    HCT 36.3 04/09/2025    HCT 37.3 06/23/2021     Lab Results   Component Value Date    MCV 93 04/09/2025    MCV 99 06/23/2021     Lab Results   Component Value Date    PLT 63 04/09/2025    PLT 58 06/23/2021       Lab Results   Component Value Date    AST 25 12/26/2024    AST 33 " 06/23/2021     Lab Results   Component Value Date    ALT 8 12/26/2024    ALT 19 06/23/2021     Lab Results   Component Value Date    BILICONJ 0.1 03/30/2011      Lab Results   Component Value Date    BILITOTAL 1.9 12/26/2024    BILITOTAL 3.6 06/23/2021       Lab Results   Component Value Date    ALBUMIN 2.2 12/26/2024    ALBUMIN 2.2 08/22/2022    ALBUMIN 2.7 06/23/2021     Lab Results   Component Value Date    PROTTOTAL 4.2 12/26/2024    PROTTOTAL 5.6 06/23/2021      Lab Results   Component Value Date    ALKPHOS 117 12/26/2024    ALKPHOS 165 06/23/2021       Lab Results   Component Value Date    INR 1.56 12/12/2024    INR 1.79 06/23/2021     US ABDOMEN COMPLETE WITH TIPS DOPPLER 12/24/2024 9:43 AM     CLINICAL HISTORY: Patient with TIPS and hepatic encephalopathy.     TECHNIQUE: Complete abdominal ultrasound. Color flow with spectral  Doppler and waveform analysis performed.     COMPARISON: Abdominal ultrasounds, most recently 10/9/2024. CT of the  abdomen and pelvis dated 4/12/2023.     FINDINGS:     GALLBLADDER: Gallstones in an otherwise normal gallbladder. No wall  thickening, or pericholecystic fluid. Negative sonographic Zimmer's  sign per the sonographer.      BILE DUCTS: No biliary dilatation. The common duct measures 3 mm.     LIVER: Coarsened echotexture, suggesting underlying fibrosis. Nodular  contour. No focal mass.      RIGHT KIDNEY: Normal size. Normal echogenicity with no hydronephrosis  or mass. Nonobstructive nephrolithiasis, with shadowing stones in the  midpole measuring 15 x 13 mm, 9 x 10 mm and 10 x 12 mm.     LEFT KIDNEY: Normal size. Normal echogenicity with no hydronephrosis  or mass. Nonobstructive nephrolithiasis, with 9 and 10 mm lower pole  stones and an 11 mm mid pole stone.     SPLEEN: Splenomegaly, measuring 15.3 cm in length. Previously, the  spleen measured 15.9 cm.     PANCREAS: The pancreas is largely obscured by overlying gas.     AORTA: Normal in caliber.     IVC: Normal where  visualized.     No ascites.     TIPS DUPLEX: Patent TIPS with antegrade flow towards the hepatic vein,  and normal waveforms. The following peak systolic velocities were  obtained:     Proximal to stent: 49 cm/s  Portal vein end of stent: 123 cm/s  Mid stent: 216 cm/s, previously 160 cm/s  Hepatic vein end of stent: 205 cm/s, previously 171 cm/s  Distal to stent: 53 cm/s     The left and right hepatic veins are not well demonstrated. The IVC  and splenic vein are patent with antegrade flow. The hepatic artery is  patent with low resistance monophasic waveforms. Peak systolic  velocity is 67 cm/s.                                                                      IMPRESSION:  1. Cirrhotic appearance of the liver. No focal mass.     2. Splenomegaly, suggestive of portal hypertension. No ascites.     3. Patent TIPS with antegrade flow. Mildly elevated velocities,  increased from prior. This may represent mild or developing stenosis.  Recommend surveillance.     4. Bilateral nonobstructive nephrolithiasis.     5. Cholelithiasis without sonographic evidence for acute  cholecystitis.     6. The pancreas is obscured by overlying bowel gas.     RIO KUO MD

## 2025-04-14 NOTE — LETTER
4/14/2025      Ivan Bell  [id number] [address] [place] [id number]      Dear Colleague,    Thank you for referring your patient, Ivan Bell, to the [facility] [place]. Please see a copy of my visit note below.    Hepatology Follow-up Clinic note  Ivan Bell   Date of Birth 1965  Reason for follow-up: Cirrhosis          Assessment/plan:   Ivan Bell is a 59 year old male with  decompensated alcohol-related cirrhosis complicated with ascites s/p TIPS 5/2018 with multiple revisions (last 2022) and hepatic encephalopathy fairly well controlled with medical therapy, although more episodes at the end of last year.  MELD 3.0, driven by Cr 1.43  = 19 (stable).  No urgent indication for liver transplant evaluation, reviewed indications and briefly discussed living donor transplant in the setting of lower MELD.  Continue optimization of nutrition and medical comorbidities to maintain overall health.    # Decompensated cirrhosis with moderate liver dysfunction, MELD 3.0 19 total by creatinine 1.43, T. bili 2.4 direct (1.20), Alb 2.8 and INR 1.63.   - BMP, Hepatic panel, CBC, INR, AFP and Peth in 6 months     # HCC screening: up-to-date:   - US abdomen w/ TIPS doppler pending at the time of visit   - US abdomen w/ TIPS doppler in six months      # Variceal screening, last EGD 2019 up to date:   - Will not need routine EGD with patent TIPS    # Hepatic encephalopathy, controlled currently, more episodes with past year:   - Continue lactulose, (dose to 2-3 BM daily)   - Change Rifaximin 550 mg to twice daily     # Ascites s/p TIPS, resolved:  # Lower extremity edema, controlled with current regimen  - Currently taking epleronone (unclear current dose).  Will have RN CC clarify home dose and adjust diuretics as needed.  - Continue 2000 mg sodium diet   - Continue high-protein diet    # History of nephrolithiasis and CKD, Cr trending up to 1.43.   - Follow up with nephrology,  last seen by Dr. Moses in 2023 in [facility]    # Snoring, daytime fatigue:   - Rule out sleep apnea contributing to fatigue.  Sleep study placed.  - Repeat TSH today improved    # Continue absolute sobriety from alcohol    - Follow-up in clinic in six months and establish care with transplant hepatologist and Dr. Bales's departure.     Celestino Verduzco PA-C   [facility] Hepatology clinic    Total time for E/M services performed on the date of the encounter 40 minutes.  This included review of previous: clinic visits, hospital records, lab results, imaging studies, and procedural documentation. Time also includes patient visit, documentation and discussion with other providers.  The findings from this review are summarized in the above note.    The longitudinal plan of care for the diagnosis(es)/condition(s) as documented were addressed during this visit. Due to the added complexity in care, I will continue to support Ivan Bell in the subsequent management and with ongoing continuity of care.  -----------------------------------------------------       HPI:   Ivan Bell is a 59 year old male presenting for follow-up.  He is accompanied by his wife today.    - ETOH  - hx ascites, TIPS placement 5/14/18, 6/6/18; TIPS revision 10/29/19, 11/25/2020, 8/11/2021, 1/28/2022  - hx HE  - hx variceal bleed Oct 2017  - ETOH hepatitis March 2019  - last EGD Jun 2019- diminutive EV, mild portal hypertensive gastropathy  - HCC screening- liver TIPS doppler U/S 10/9/24    Patient was last seen by Dr Bales on  10/9/2024.   Recent hospitalizations or ER visits: Patient was hospitalized in late December for hepatic encephalopathy.  Enterococcal UTI.  He was encouraged to increase dose of lactulose dose to 3 times a day to ensure that he is having 3-4 bMa day.    - Experienced two episodes of confusion back in September, with no emergency department visits.  - Taking lactulose, initially decreased dose last fall,  increased again in December due to confusion. having 2-3 bowel movements a day  - Taking rifaximin, taking both tablets once a day.     - Reports constant fatigue, unsure if related to liver condition or potential sleep apnea.  - Snores heavily, no formal diagnosis of sleep apnea, but was advised to use a CPAP during knee surgery.    - History of knee problems, with surgeries three years ago and last year. Experienced overmedication with Dilaudid during a hospital stay, leading to prolonged sedation.  - Reports swelling in knees and legs, varies with activity.  - Taking eplerenone, with doses of 50 mg twice a day and 25 mg daily on medication list. Unclear current dose.     - Appetite fluctuates, maintains weight around 180 lbs.  - No blood or black stools, no bloating reported.    - Engages in physical activities like splitting wood, maintains strength at this time.  - Avoids alcohol, last consumed eight years ago.    - History of kidney stones, previously removed.  Followed by Dr. Camacho in nephrology.  - Reports hand tremors, especially noticeable when eating.  - Thyroid function was off in December during a hospital admission,          Medications:     Current Outpatient Medications   Medication Sig Dispense Refill     acetaminophen (TYLENOL) 500 MG tablet Take 1,000 mg by mouth every 6 hours as needed for mild pain        Ascorbic Acid (VITAMIN C PO) Take 500 mg by mouth daily       eplerenone (INSPRA) 50 MG tablet Take 1 tablet (50 mg) by mouth 2 times daily. 60 tablet 0     lactulose (CHRONULAC) 10 GM/15ML solution Take 30 mLs (20 g) by mouth 3 times daily. TAKE TO MAINTAIN 3 TO 4 BOWEL MOVEMENTS DAILY 2700 mL 0     MULTIPLE VITAMINS PO Take 1 tablet by mouth daily       potassium chloride maira ER (KLOR-CON M20) 20 MEQ CR tablet Take 1 tablet (20 mEq) by mouth daily. 90 tablet 3     rifaximin (XIFAXAN) 550 MG TABS tablet Take 1 tablet (550 mg) by mouth 2 times daily. 180 tablet 3     sertraline (ZOLOFT) 25  "MG tablet Take 1 tablet (25 mg) by mouth daily 90 tablet 3     sildenafil (REVATIO) 20 MG tablet Take 3-5 tabs 1/2-4 hours to relations 30 tablet 1     sodium bicarbonate 650 MG tablet Take 0.5 tablets (325 mg) by mouth 2 times daily. 180 tablet 0     vitamin D3 (CHOLECALCIFEROL) 2000 units (50 mcg) tablet Take 1 tablet by mouth daily       No current facility-administered medications for this visit.            Review of Systems:   10 points ROS was obtained and highlighted in the HPI, otherwise negative.          Physical Exam:   BP (!) 160/76   Pulse 66   Resp 16   Ht 1.803 m (5' 11\")   Wt 81.9 kg (180 lb 9.6 oz)   SpO2 99%   BMI 25.19 kg/m      Gen: A&Ox3, NAD, thin, temporal muscle wasting  HEENT: non-icteric   CV: RRR, no overt murmurs  Lung: CTA Bilatererally, no wheezing or crackles.   Lym- no palpable lymphadenopathy  Abd: soft, NT, ND, no organomegaly.   Ext: mild bilateral pitting   Skin: No rash, no palmar erythema, telangiectasias or jaundice  Neuro: grossly intact, no asterixis   Psych: appropriate mood and affects         Data:   Reviewed in person and significant for:    Lab Results   Component Value Date     12/26/2024     06/23/2021      Lab Results   Component Value Date    POTASSIUM 4.0 12/26/2024    POTASSIUM 3.9 08/22/2022    POTASSIUM 4.0 06/23/2021     Lab Results   Component Value Date    CHLORIDE 114 12/26/2024    CHLORIDE 116 08/22/2022    CHLORIDE 117 06/23/2021     Lab Results   Component Value Date    CO2 20 12/26/2024    CO2 23 08/22/2022    CO2 25 06/23/2021     Lab Results   Component Value Date    BUN 12.6 12/26/2024    BUN 12 08/22/2022    BUN 8 06/23/2021     Lab Results   Component Value Date    CR 1.27 12/26/2024    CR 1.09 06/23/2021       Lab Results   Component Value Date    WBC 4.1 04/09/2025    WBC 4.3 06/23/2021     Lab Results   Component Value Date    HGB 12.6 04/09/2025    HGB 12.1 06/23/2021     Lab Results   Component Value Date    HCT 36.3 04/09/2025 "    HCT 37.3 06/23/2021     Lab Results   Component Value Date    MCV 93 04/09/2025    MCV 99 06/23/2021     Lab Results   Component Value Date    PLT 63 04/09/2025    PLT 58 06/23/2021       Lab Results   Component Value Date    AST 25 12/26/2024    AST 33 06/23/2021     Lab Results   Component Value Date    ALT 8 12/26/2024    ALT 19 06/23/2021     Lab Results   Component Value Date    BILICONJ 0.1 03/30/2011      Lab Results   Component Value Date    BILITOTAL 1.9 12/26/2024    BILITOTAL 3.6 06/23/2021       Lab Results   Component Value Date    ALBUMIN 2.2 12/26/2024    ALBUMIN 2.2 08/22/2022    ALBUMIN 2.7 06/23/2021     Lab Results   Component Value Date    PROTTOTAL 4.2 12/26/2024    PROTTOTAL 5.6 06/23/2021      Lab Results   Component Value Date    ALKPHOS 117 12/26/2024    ALKPHOS 165 06/23/2021       Lab Results   Component Value Date    INR 1.56 12/12/2024    INR 1.79 06/23/2021     US ABDOMEN COMPLETE WITH TIPS DOPPLER 12/24/2024 9:43 AM     CLINICAL HISTORY: Patient with TIPS and hepatic encephalopathy.     TECHNIQUE: Complete abdominal ultrasound. Color flow with spectral  Doppler and waveform analysis performed.     COMPARISON: Abdominal ultrasounds, most recently 10/9/2024. CT of the  abdomen and pelvis dated 4/12/2023.     FINDINGS:     GALLBLADDER: Gallstones in an otherwise normal gallbladder. No wall  thickening, or pericholecystic fluid. Negative sonographic Zimmer's  sign per the sonographer.      BILE DUCTS: No biliary dilatation. The common duct measures 3 mm.     LIVER: Coarsened echotexture, suggesting underlying fibrosis. Nodular  contour. No focal mass.      RIGHT KIDNEY: Normal size. Normal echogenicity with no hydronephrosis  or mass. Nonobstructive nephrolithiasis, with shadowing stones in the  midpole measuring 15 x 13 mm, 9 x 10 mm and 10 x 12 mm.     LEFT KIDNEY: Normal size. Normal echogenicity with no hydronephrosis  or mass. Nonobstructive nephrolithiasis, with 9 and 10 mm lower  pole  stones and an 11 mm mid pole stone.     SPLEEN: Splenomegaly, measuring 15.3 cm in length. Previously, the  spleen measured 15.9 cm.     PANCREAS: The pancreas is largely obscured by overlying gas.     AORTA: Normal in caliber.     IVC: Normal where visualized.     No ascites.     TIPS DUPLEX: Patent TIPS with antegrade flow towards the hepatic vein,  and normal waveforms. The following peak systolic velocities were  obtained:     Proximal to stent: 49 cm/s  Portal vein end of stent: 123 cm/s  Mid stent: 216 cm/s, previously 160 cm/s  Hepatic vein end of stent: 205 cm/s, previously 171 cm/s  Distal to stent: 53 cm/s     The left and right hepatic veins are not well demonstrated. The IVC  and splenic vein are patent with antegrade flow. The hepatic artery is  patent with low resistance monophasic waveforms. Peak systolic  velocity is 67 cm/s.                                                                      IMPRESSION:  1. Cirrhotic appearance of the liver. No focal mass.     2. Splenomegaly, suggestive of portal hypertension. No ascites.     3. Patent TIPS with antegrade flow. Mildly elevated velocities,  increased from prior. This may represent mild or developing stenosis.  Recommend surveillance.     4. Bilateral nonobstructive nephrolithiasis.     5. Cholelithiasis without sonographic evidence for acute  cholecystitis.     6. The pancreas is obscured by overlying bowel gas.     RIO KUO MD       Again, thank you for allowing me to participate in the care of your patient.        Sincerely,        Celestino Verduzco PA-C    Electronically signed

## 2025-05-22 ENCOUNTER — TRANSFERRED RECORDS (OUTPATIENT)
Dept: HEALTH INFORMATION MANAGEMENT | Facility: CLINIC | Age: 60
End: 2025-05-22
Payer: COMMERCIAL

## 2025-05-27 ENCOUNTER — TRANSFERRED RECORDS (OUTPATIENT)
Dept: HEALTH INFORMATION MANAGEMENT | Facility: CLINIC | Age: 60
End: 2025-05-27
Payer: COMMERCIAL

## 2025-05-30 ENCOUNTER — HOSPITAL ENCOUNTER (OUTPATIENT)
Facility: CLINIC | Age: 60
End: 2025-05-30
Attending: ORTHOPAEDIC SURGERY | Admitting: ORTHOPAEDIC SURGERY
Payer: COMMERCIAL

## 2025-06-02 NOTE — PROGRESS NOTES
06/01/25 2057   Discharge Planning   Patient/Family Anticipates Transition to home   Concerns to be Addressed all concerns addressed in this encounter   Living Arrangements   People in Home spouse   Type of Residence Private Residence   Is your private residence a single family home or apartment? Single family home   Stair Railings, Within Home, Primary railings safe and in good condition   Once home, are you able to live on one level? Yes   Which level? Main Level   Bathroom Shower/Tub Tub/Shower unit   Equipment Currently Used at Home none   Support System   Support Systems Spouse/Significant Other   Do you have someone available to stay with you one or two nights once you are home? Yes   Falls Risk   Has the patient been identified as a high falls risk before surgery? (fallen in the last 6 months, history of falls, unsteady gait, or balance issues) No   Did an order get placed to attend outpatient pre-surgery balance evaluation? No   Relationship/Environment   Name(s) of People in Home [place]fe

## 2025-06-21 ENCOUNTER — HEALTH MAINTENANCE LETTER (OUTPATIENT)
Age: 60
End: 2025-06-21

## 2025-06-23 NOTE — TELEPHONE ENCOUNTER
DIAGNOSIS: M16.12 (ICD-10-CM) - Osteoarthritis of left hip    APPOINTMENT DATE: 7/24/2025   NOTES STATUS DETAILS   OFFICE NOTE from referring provider Epic 5/30/2025 5/22/2025  José Miguel Landaverde MD ([place])   OFFICE NOTE from other specialist [facility]              Internal 5/27/2025  Kang Davis MD ([place])  - L Hip    4/8/2025  ROGER Arredondo ([place])  - B/L Knee    7/15/24  ROGER Grover ([place])  - L Hip    4/19/2023, more  Nina Montoya CNP ([facility] [place])  - LBP/Sciatic   OFFICE NOTE from physical therapist Internal 1/9/2023   - LBP/Sciatica   Injections Epic    Internal 5/27/2025 8/7/2024 (Dr. Davis)  B/L Hips    Low Back injections  5/15/2023, 11/2/2022, more   OPERATIVE REPORT Internal 9/19/2023  José Miguel Landaverde MD  - L Knee Arthoroplasty   MEDICATION LIST Internal    EMG (for Spine) Internal 1/2/2014   LABS     CBC/DIFF Internal 4/14/2025   US Internal L Knee  9/19/2023 (with surgery)    Lumbar Injection  5/15/2023  10/2/2022    Abdomen  4/14/2025  12/24/2024  10/9/2024 + more   MRI Internal Lumbar  4/17/2023   CT SCAN Internal Abd/Pelvis  4/12/2023   XRAYS (IMAGES & REPORTS) Internal Lumbar  4/17/2023

## 2025-07-09 ENCOUNTER — PATIENT OUTREACH (OUTPATIENT)
Dept: NEPHROLOGY | Facility: CLINIC | Age: 60
End: 2025-07-09
Payer: COMMERCIAL

## 2025-07-09 ENCOUNTER — TELEPHONE (OUTPATIENT)
Dept: NEPHROLOGY | Facility: CLINIC | Age: 60
End: 2025-07-09
Payer: COMMERCIAL

## 2025-07-09 DIAGNOSIS — M25.552 LEFT HIP PAIN: Primary | ICD-10-CM

## 2025-07-09 NOTE — TELEPHONE ENCOUNTER
[facility] Call Center    Phone Message    May a detailed message be left on voicemail: anthony    Reason for Call: Pt declined to schedule offered appt on 07/10 at 3pm. Pt is scheduled next available on 11/13/25 and on wait list, please review if appropriate.    Action Taken: Message routed to:  [facility] Nephrology    Travel Screening: Not Applicable     Date of Service:

## 2025-07-09 NOTE — PROGRESS NOTES
7/9- Called Ivan to see if he would be available to see Dr. Moses tomorrow at 3:00. No answer. LVM to return my call.  Reji DARNELL RN  Nephrology care coordinator  [place]

## 2025-07-09 NOTE — TELEPHONE ENCOUNTER
Left Voicemail (1st Attempt) and Sent Mychart (1st Attempt) for the patient to call back and schedule the following:    Appointment type: RTN Kidney stone/ RTN NEPH  Provider: Dr. Moses  Return date: 07/10/25   Specialty phone number: [phone]  Additional appointment(s) needed: N/A  Additonal Notes: N/A

## 2025-07-24 ENCOUNTER — PRE VISIT (OUTPATIENT)
Dept: ORTHOPEDICS | Facility: CLINIC | Age: 60
End: 2025-07-24

## 2025-07-24 ENCOUNTER — OFFICE VISIT (OUTPATIENT)
Dept: ORTHOPEDICS | Facility: CLINIC | Age: 60
End: 2025-07-24
Payer: COMMERCIAL

## 2025-07-24 VITALS — WEIGHT: 179 LBS | BODY MASS INDEX: 25.06 KG/M2 | HEIGHT: 71 IN

## 2025-07-24 DIAGNOSIS — M16.11 PRIMARY OSTEOARTHRITIS OF RIGHT HIP: Primary | ICD-10-CM

## 2025-07-24 DIAGNOSIS — M16.12 PRIMARY OSTEOARTHRITIS OF LEFT HIP: ICD-10-CM

## 2025-07-24 ASSESSMENT — HOOS JR
LYING IN BED (TURNING OVER, MAINTAINING HIP POSITION): MODERATE
GOING UP OR DOWN STAIRS: MODERATE
WALKING ON UNEVEN SURFACE: MODERATE
HOOS JR TOTAL INTERVAL SCORE: 43.34
RISING FROM SITTING: SEVERE
BENDING TO THE FLOOR TO PICK UP OBJECT: EXTREME
SITTING: MODERATE

## 2025-07-24 NOTE — PROGRESS NOTES
Assessment: This is a 59-year-old male with bilateral moderate to advanced hip osteoarthritis with severe pain.  This is however in the setting of significant low back pain with radicular symptoms as well as bilateral greater trochanter pain.  We discussed that these diagnoses and reviewed his radiographs.  We discussed that there would likely not be a single intervention that would address all 3 pain generators.  We discussed that it would be appropriate to address them in the order that he preferred in terms of the significance of the symptoms.  If he chose to address his spine first I suggested a referral to nonoperative spine.  If he wanted to address the trochanter symptoms this may be difficult in the setting of arthritis but that I would would start with a physical therapy program.  If he wanted to address the hip first this would be reasonable considering his strong positive response to initial intra-articular injection.  He states he wants to address the hip first.We discussed that given the level of symptoms and radiographic changes that further non-operative management in the form injection and/or physical therapist directed exercises is not mandatory. We spent twenty minutes discussing total hip arthroplasty.  We discussed the implants, the procedure, the risks and benefits, and the post-operative course.  We discussed blood clots, blood clots to the bell point we discussed that as part of the routine part of surgical care a qualified assist may finish closing the wound after I have left the room. All the patients questions were answered to the best of my ability.    Plan: staged bilateral total hip when the patient chooses to go forward with it.       Chief Complaint: Consult (Bilateral hip pain referral [facility] )      Physician:  Referred Self    HPI: Ivan Bell is a 59 year old male who presents today for evaluation of bilateral hips.     Location of symptoms:  low  back, butocks, groin.   Onset: insidious  Duration of symptoms: > than a year but very tough the past year  Quality of symptoms: aching   Severity: severe  Alleviating: activity modification-- has stopped working as a  due to hip pain   Exacerbating: activities  Previous Treatments: Previous treatments include activity modification, oral pain medication (daily OTC)    Intra-articular injection left hip with short term relief.     HOOS Jr: 43.34  PROMIS Mental:    PROMIS Physical:    PROMIS Total:    [place] Activity Scale:    MEDICAL HISTORY:   Past Medical History:   Diagnosis Date    Alcoholic cirrhosis of liver (H)     Alcoholic hepatitis (H) 03/2019    Chronic kidney disease     CRF    Depression     Gout     History of transfusion     Hypertension     Hypertriglyceridemia     Left calcaneus fracture 01/09/2006 January 16, 2006: Fell 10 feet from ladder onto left foot on frozen ground on 1/9/06 at home.  Immediate pain and unable to walk- seen at [place] and diagnosed with calcaneus fracture    Nephrolithiasis     Osteoarthritis     PONV (postoperative nausea and vomiting)     Portal vein thrombosis     left occlusion, partial main    Thrombocytopenia        Pertinent negatives:  Patient has no history of DVT or PE. Discussed risk factors.    Medications:     Current Outpatient Medications:     acetaminophen (TYLENOL) 500 MG tablet, Take 1,000 mg by mouth every 6 hours as needed for mild pain , Disp: , Rfl:     Ascorbic Acid (VITAMIN C PO), Take 500 mg by mouth daily, Disp: , Rfl:     eplerenone (INSPRA) 25 MG tablet, Take by mouth daily., Disp: , Rfl:     eplerenone (INSPRA) 50 MG tablet, Take 1 tablet (50 mg) by mouth 2 times daily., Disp: 60 tablet, Rfl: 0    lactulose (CHRONULAC) 10 GM/15ML solution, Take 30 mLs (20 g) by mouth 3 times daily. TAKE TO MAINTAIN 3 TO 4 BOWEL MOVEMENTS DAILY, Disp: 2700 mL, Rfl: 5    MULTIPLE VITAMINS PO, Take 1 tablet by mouth daily, Disp: , Rfl:     potassium  chloride maira ER (KLOR-CON M20) 20 MEQ CR tablet, Take 1 tablet (20 mEq) by mouth daily., Disp: 90 tablet, Rfl: 3    rifaximin (XIFAXAN) 550 MG TABS tablet, Take 1 tablet (550 mg) by mouth 2 times daily., Disp: 180 tablet, Rfl: 3    sildenafil (REVATIO) 20 MG tablet, Take 3-5 tabs 1/2-4 hours to relations (Patient not taking: Reported on 4/14/2025), Disp: 30 tablet, Rfl: 1    sodium bicarbonate 650 MG tablet, Take 0.5 tablets (325 mg) by mouth 2 times daily., Disp: 180 tablet, Rfl: 0    vitamin D3 (CHOLECALCIFEROL) 2000 units (50 mcg) tablet, Take 1 tablet by mouth daily, Disp: , Rfl:     Allergies: Trazodone and Oxycodone    SURGICAL HISTORY:   Past Surgical History:   Procedure Laterality Date    ANKLE SURGERY Left     ANKLE SURGERY      ARTHROPLASTY KNEE Left 9/19/2023    Procedure: LEFT TOTAL KNEE ARTHROPLASTY;  Surgeon: José Miguel Landaverde MD;  Location: [facility] OR    ARTHROSCOPY KNEE      COLONOSCOPY N/A 03/31/2016    Procedure: COLONOSCOPY;  Surgeon: Rhys Uriostegui MD;  Location: [facility] GI    COMBINED CYSTOSCOPY, RETROGRADES, URETEROSCOPY, LASER HOLMIUM LITHOTRIPSY URETER(S), INSERT STENT Bilateral 7/1/2022    Procedure: CYSTOSCOPY, RIGHT RETROGRADE PYELOGRAM, RIGHT URETEROSCOPY WITH LASER LITHOTRIPSY AND BASKET REMOVAL OF STONE, RIGHT URETERAL STENT PLACEMENT, LEFT RETROGRADE PYELOGRAM, LEFT URETEROSCOPY WITH LASER LITHOTRIPSY AND BASKET REMOVAL OF STONE, LEFT URETERAL STENT PLACEMENT;  Surgeon: Dylan Galaviz MD;  Location:  OR    COMBINED CYSTOSCOPY, RETROGRADES, URETEROSCOPY, LASER HOLMIUM LITHOTRIPSY URETER(S), INSERT STENT Bilateral 7/27/2022    Procedure: CYSTOSCOPY, BILATERAL URETERAL STENT REMOVAL, BILATERAL  RETROGRADE PYELOGRAM, BILATERAL URETEROSCOPY. HOLMIUM LASER LITHOTRIPSY LEFT SIDE.  AND BASKET REMOVAL OF STONES BILATERAL, LEFT  URETERAL STENT PLACEMENT;  Surgeon: Dylan Galaviz MD;  Location:  OR    COMBINED CYSTOSCOPY, RETROGRADES, URETEROSCOPY, LASER HOLMIUM  LITHOTRIPSY URETER(S), INSERT STENT Left 4/24/2023    Procedure: CYSTOSCOPY, LEFT RETROGRADE PYELOGRAM, LEFT URETEROSCOPY WITH LASER LITHOTRIOPSY AND BASKET REMOVAL OF STONES, LEFT URETERAL STENT PLACEMENT;  Surgeon: Dylan Galaviz MD;  Location:  OR    COMBINED CYSTOSCOPY, RETROGRADES, URETEROSCOPY, LASER HOLMIUM LITHOTRIPSY URETER(S), INSERT STENT Left 5/10/2023    Procedure: CYSTOSCOPY, LEFT URETERAL STENT REMOVAL, LEFT RETROGRADE PYELOGRAM, LEFT URETEROSCOPY WITH LASER LITHOTRIOPSY AND BASKET REMOVAL OF STONES, LEFT URETERAL STENT PLACEMENT;  Surgeon: Dylan Galaviz MD;  Location:  OR    ESOPHAGOSCOPY, GASTROSCOPY, DUODENOSCOPY (EGD), COMBINED N/A 03/31/2016    Procedure: COMBINED ESOPHAGOSCOPY, GASTROSCOPY, DUODENOSCOPY (EGD);  Surgeon: Rhys Uriostegui MD;  Location:  GI    ESOPHAGOSCOPY, GASTROSCOPY, DUODENOSCOPY (EGD), COMBINED N/A 03/09/2018    Procedure: COMBINED ESOPHAGOSCOPY, GASTROSCOPY, DUODENOSCOPY (EGD), BIOPSY SINGLE OR MULTIPLE;  EGD;  Surgeon: Gonzalo Wahl MD;  Location:  GI    ESOPHAGOSCOPY, GASTROSCOPY, DUODENOSCOPY (EGD), COMBINED N/A 06/07/2019    Procedure: ESOPHAGOGASTRODUODENOSCOPY (EGD);  Surgeon: Gonzalo aWhl MD;  Location:  GI    FOOT SURGERY      IR PARACENTESIS  10/29/2019    IR PARACENTESIS  11/25/2020    IR PARACENTESIS  08/11/2021    IR PARACENTESIS  01/28/2022    IR PARACENTESIS  03/30/2022    IR PARACENTESIS  7/23/2021    IR PARACENTESIS  8/5/2021    IR PARACENTESIS  4/27/2022    IR TRANSVEN INTRAHEPATIC PORTOSYST REV  10/29/2019    IR TRANSVEN INTRAHEPATIC PORTOSYST REV  11/25/2020    IR TRANSVEN INTRAHEPATIC PORTOSYST REV  08/11/2021    IR TRANSVEN INTRAHEPATIC PORTOSYST REV  01/28/2022    IR TRANSVEN INTRAHEPATIC PORTOSYST REV  03/30/2022    KNEE SURGERY Left     KNEE SURGERY Right     RELEASE CARPAL TUNNEL      RELEASE TRIGGER FINGER Right 06/11/2020    Procedure: RELEASE, TRIGGER FINGER, right ring and long finger;  Surgeon:  Pascual Valencia MD;  Location: [facility] OR    SIGMOIDOSCOPY FLEXIBLE N/A 10/31/2017    Procedure: SIGMOIDOSCOPY FLEXIBLE;;  Surgeon: Armaan Adams MD;  Location: [facility]    TIPS Procedure  2018    TIPS PROCEDURE  2020    ZZC PLASTY KNEE,MED OR LAT COMPARTMT Right 2021    Procedure: RIGHT UNICOMPARTMENTAL ARTHROPLASTY KNEE MEDIAL;  Surgeon: José Miguel Landaverde MD;  Location: [facility];  Service: Orthopedics   Bilateral total knees 2 and 4 years ago, no issues medically with the surgeries.     HISTORY:   Family History   Problem Relation Age of Onset    Family History Negative Mother     Family History Negative Father     Hypertension Father     Cerebrovascular Disease Father 87    Breast Cancer Maternal Grandmother     Substance Abuse Brother     Rheumatoid Arthritis Daughter     Depression Daughter     Cancer - colorectal No family hx of     Prostate Cancer No family hx of     Liver Disease No family hx of        SOCIAL HISTORY:   Social History     Tobacco Use    Smoking status: Former     Current packs/day: 0.00     Types: Dip, chew, snus or snuff, Cigarettes     Quit date: 1998     Years since quittin.9     Passive exposure: Never    Smokeless tobacco: Former     Types: Chew    Tobacco comments:     1 tin per 10 days.   Substance Use Topics    Alcohol use: No     Comment: sober since    No EtOH    REVIEW OF SYSTEMS:  The comprehensive review of systems from the intake form was reviewed with the patient.  No fever, weight change or fatigue. No dry eyes. No oral ulcers, sore throat or voice change. No palpitations, syncope, angina or edema.  No chest pain, excessive sleepiness, shortness of breath or hemoptysis.   No abdominal pain, nausea, vomiting, diarrhea or heartburn.  No skin rash. No focal weakness or numbness. No bleeding or lymphadenopathy. No rhinitis or hives.     Exam:  On physical examination the patient appears the stated age, is in no acute distress, alert and  "oriented, affect is appropriate, and breathing is non-labored.  Vitals are documented in the EMR and have been reviewed:    Ht 1.803 m (5' 11\")   Wt 81.2 kg (179 lb)   BMI 24.97 kg/m    5' 11\"  Body mass index is 24.97 kg/m .    Rises from chair: slowly   Gait:stooped and uncomfortable  Trendelenburg test:  Gains the exam table: slowly   Uncomfortable lying flat     RIGHT hip subjective: sore/irriated   Abd: 20 sore   Add:  Flexion: 85  IRF:-5  ERF:30  Impingement test: + groin   Tenderness to palpation:++     LEFT hip symmetric with right     Distal the circulatory, motor, and sensation exam is intact with 5/5 EHL, gastroc-soleus, and tibialis anterior.  Sensation to light touch is intact.  Dorsalis pedis and posterior tibialis pulses are palpable.  There are no sores on the feet, no bruising, and no lymphedema.    Imaging:   Bilatearl Tonis 2-3 osteoarthritis     "

## 2025-07-24 NOTE — LETTER
7/24/2025      Ivan Bell  [id number] [id number]      Dear Colleague,    Thank you for referring your patient, Ivan Bell, to the [facility] [place]. Please see a copy of my visit note below.    Assessment: This is a 59-year-old male with bilateral moderate to advanced hip osteoarthritis with severe pain.  This is however in the setting of significant low back pain with radicular symptoms as well as bilateral greater trochanter pain.  We discussed that these diagnoses and reviewed his radiographs.  We discussed that there would likely not be a single intervention that would address all 3 pain generators.  We discussed that it would be appropriate to address them in the order that he preferred in terms of the significance of the symptoms.  If he chose to address his spine first I suggested a referral to nonoperative spine.  If he wanted to address the trochanter symptoms this may be difficult in the setting of arthritis but that I would would start with a physical therapy program.  If he wanted to address the hip first this would be reasonable considering his strong positive response to initial intra-articular injection.  He states he wants to address the hip first.We discussed that given the level of symptoms and radiographic changes that further non-operative management in the form injection and/or physical therapist directed exercises is not mandatory. We spent twenty minutes discussing total hip arthroplasty.  We discussed the implants, the procedure, the risks and benefits, and the post-operative course.  We discussed blood clots, blood clots to the bell point we discussed that as part of the routine part of surgical care a qualified assist may finish closing the wound after I have left the room. All the patients questions were answered to the best of my ability.    Plan: staged bilateral total hip when the patient chooses to go forward with it.       Chief  Complaint: Consult (Bilateral hip pain referral [facility] )      Physician:  Referred Self    HPI: Ivan Bell is a 59 year old male who presents today for evaluation of bilateral hips.     Location of symptoms:  low back, butocks, groin.   Onset: insidious  Duration of symptoms: > than a year but very tough the past year  Quality of symptoms: aching   Severity: severe  Alleviating: activity modification-- has stopped working as a  due to hip pain   Exacerbating: activities  Previous Treatments: Previous treatments include activity modification, oral pain medication (daily OTC)    Intra-articular injection left hip with short term relief.     HOOS Jr: 43.34  PROMIS Mental:    PROMIS Physical:    PROMIS Total:    [place] Activity Scale:    MEDICAL HISTORY:   Past Medical History:   Diagnosis Date     Alcoholic cirrhosis of liver (H)      Alcoholic hepatitis (H) 03/2019     Chronic kidney disease     CRF     Depression      Gout      History of transfusion      Hypertension      Hypertriglyceridemia      Left calcaneus fracture 01/09/2006 January 16, 2006: Fell 10 feet from ladder onto left foot on frozen ground on 1/9/06 at home.  Immediate pain and unable to walk- seen at [place] and diagnosed with calcaneus fracture     Nephrolithiasis      Osteoarthritis      PONV (postoperative nausea and vomiting)      Portal vein thrombosis     left occlusion, partial main     Thrombocytopenia        Pertinent negatives:  Patient has no history of DVT or PE. Discussed risk factors.    Medications:     Current Outpatient Medications:      acetaminophen (TYLENOL) 500 MG tablet, Take 1,000 mg by mouth every 6 hours as needed for mild pain , Disp: , Rfl:      Ascorbic Acid (VITAMIN C PO), Take 500 mg by mouth daily, Disp: , Rfl:      eplerenone (INSPRA) 25 MG tablet, Take by mouth daily., Disp: , Rfl:      eplerenone (INSPRA) 50 MG tablet, Take 1 tablet (50 mg) by mouth 2 times  daily., Disp: 60 tablet, Rfl: 0     lactulose (CHRONULAC) 10 GM/15ML solution, Take 30 mLs (20 g) by mouth 3 times daily. TAKE TO MAINTAIN 3 TO 4 BOWEL MOVEMENTS DAILY, Disp: 2700 mL, Rfl: 5     MULTIPLE VITAMINS PO, Take 1 tablet by mouth daily, Disp: , Rfl:      potassium chloride maira ER (KLOR-CON M20) 20 MEQ CR tablet, Take 1 tablet (20 mEq) by mouth daily., Disp: 90 tablet, Rfl: 3     rifaximin (XIFAXAN) 550 MG TABS tablet, Take 1 tablet (550 mg) by mouth 2 times daily., Disp: 180 tablet, Rfl: 3     sildenafil (REVATIO) 20 MG tablet, Take 3-5 tabs 1/2-4 hours to relations (Patient not taking: Reported on 4/14/2025), Disp: 30 tablet, Rfl: 1     sodium bicarbonate 650 MG tablet, Take 0.5 tablets (325 mg) by mouth 2 times daily., Disp: 180 tablet, Rfl: 0     vitamin D3 (CHOLECALCIFEROL) 2000 units (50 mcg) tablet, Take 1 tablet by mouth daily, Disp: , Rfl:     Allergies: Trazodone and Oxycodone    SURGICAL HISTORY:   Past Surgical History:   Procedure Laterality Date     ANKLE SURGERY Left      ANKLE SURGERY       ARTHROPLASTY KNEE Left 9/19/2023    Procedure: LEFT TOTAL KNEE ARTHROPLASTY;  Surgeon: José Miguel Landaverde MD;  Location: [facility] OR     ARTHROSCOPY KNEE       COLONOSCOPY N/A 03/31/2016    Procedure: COLONOSCOPY;  Surgeon: Rhys Uriostegui MD;  Location: [facility]     COMBINED CYSTOSCOPY, RETROGRADES, URETEROSCOPY, LASER HOLMIUM LITHOTRIPSY URETER(S), INSERT STENT Bilateral 7/1/2022    Procedure: CYSTOSCOPY, RIGHT RETROGRADE PYELOGRAM, RIGHT URETEROSCOPY WITH LASER LITHOTRIPSY AND BASKET REMOVAL OF STONE, RIGHT URETERAL STENT PLACEMENT, LEFT RETROGRADE PYELOGRAM, LEFT URETEROSCOPY WITH LASER LITHOTRIPSY AND BASKET REMOVAL OF STONE, LEFT URETERAL STENT PLACEMENT;  Surgeon: Dylan Galaviz MD;  Location:  OR     COMBINED CYSTOSCOPY, RETROGRADES, URETEROSCOPY, LASER HOLMIUM LITHOTRIPSY URETER(S), INSERT STENT Bilateral 7/27/2022    Procedure: CYSTOSCOPY, BILATERAL URETERAL STENT REMOVAL,  BILATERAL  RETROGRADE PYELOGRAM, BILATERAL URETEROSCOPY. HOLMIUM LASER LITHOTRIPSY LEFT SIDE.  AND BASKET REMOVAL OF STONES BILATERAL, LEFT  URETERAL STENT PLACEMENT;  Surgeon: Dylan Galaviz MD;  Location:  OR     COMBINED CYSTOSCOPY, RETROGRADES, URETEROSCOPY, LASER HOLMIUM LITHOTRIPSY URETER(S), INSERT STENT Left 4/24/2023    Procedure: CYSTOSCOPY, LEFT RETROGRADE PYELOGRAM, LEFT URETEROSCOPY WITH LASER LITHOTRIOPSY AND BASKET REMOVAL OF STONES, LEFT URETERAL STENT PLACEMENT;  Surgeon: Dylan Galaviz MD;  Location:  OR     COMBINED CYSTOSCOPY, RETROGRADES, URETEROSCOPY, LASER HOLMIUM LITHOTRIPSY URETER(S), INSERT STENT Left 5/10/2023    Procedure: CYSTOSCOPY, LEFT URETERAL STENT REMOVAL, LEFT RETROGRADE PYELOGRAM, LEFT URETEROSCOPY WITH LASER LITHOTRIOPSY AND BASKET REMOVAL OF STONES, LEFT URETERAL STENT PLACEMENT;  Surgeon: Dylan Galaviz MD;  Location:  OR     ESOPHAGOSCOPY, GASTROSCOPY, DUODENOSCOPY (EGD), COMBINED N/A 03/31/2016    Procedure: COMBINED ESOPHAGOSCOPY, GASTROSCOPY, DUODENOSCOPY (EGD);  Surgeon: Rhys Uriostegui MD;  Location:  GI     ESOPHAGOSCOPY, GASTROSCOPY, DUODENOSCOPY (EGD), COMBINED N/A 03/09/2018    Procedure: COMBINED ESOPHAGOSCOPY, GASTROSCOPY, DUODENOSCOPY (EGD), BIOPSY SINGLE OR MULTIPLE;  EGD;  Surgeon: Gonzalo Wahl MD;  Location:  GI     ESOPHAGOSCOPY, GASTROSCOPY, DUODENOSCOPY (EGD), COMBINED N/A 06/07/2019    Procedure: ESOPHAGOGASTRODUODENOSCOPY (EGD);  Surgeon: Gonzalo Wahl MD;  Location:  GI     FOOT SURGERY       IR PARACENTESIS  10/29/2019     IR PARACENTESIS  11/25/2020     IR PARACENTESIS  08/11/2021     IR PARACENTESIS  01/28/2022     IR PARACENTESIS  03/30/2022     IR PARACENTESIS  7/23/2021     IR PARACENTESIS  8/5/2021     IR PARACENTESIS  4/27/2022     IR TRANSVEN INTRAHEPATIC PORTOSYST REV  10/29/2019     IR TRANSVEN INTRAHEPATIC PORTOSYST REV  11/25/2020     IR TRANSVEN INTRAHEPATIC PORTOSYST REV  08/11/2021     IR  TRANSVEN INTRAHEPATIC PORTOSYST REV  2022     IR TRANSVEN INTRAHEPATIC PORTOSYST REV  2022     KNEE SURGERY Left      KNEE SURGERY Right      RELEASE CARPAL TUNNEL       RELEASE TRIGGER FINGER Right 2020    Procedure: RELEASE, TRIGGER FINGER, right ring and long finger;  Surgeon: Pascual Valencia MD;  Location: [facility] OR     SIGMOIDOSCOPY FLEXIBLE N/A 10/31/2017    Procedure: SIGMOIDOSCOPY FLEXIBLE;;  Surgeon: Armaan Adams MD;  Location: [facility]     TIPS Procedure  2018     TIPS PROCEDURE  2020     ZZC PLASTY KNEE,MED OR LAT COMPARTMT Right 2021    Procedure: RIGHT UNICOMPARTMENTAL ARTHROPLASTY KNEE MEDIAL;  Surgeon: José Miguel Landaverde MD;  Location: [facility];  Service: Orthopedics   Bilateral total knees 2 and 4 years ago, no issues medically with the surgeries.     HISTORY:   Family History   Problem Relation Age of Onset     Family History Negative Mother      Family History Negative Father      Hypertension Father      Cerebrovascular Disease Father 87     Breast Cancer Maternal Grandmother      Substance Abuse Brother      Rheumatoid Arthritis Daughter      Depression Daughter      Cancer - colorectal No family hx of      Prostate Cancer No family hx of      Liver Disease No family hx of        SOCIAL HISTORY:   Social History     Tobacco Use     Smoking status: Former     Current packs/day: 0.00     Types: Dip, chew, snus or snuff, Cigarettes     Quit date: 1998     Years since quittin.9     Passive exposure: Never     Smokeless tobacco: Former     Types: Chew     Tobacco comments:     1 tin per 10 days.   Substance Use Topics     Alcohol use: No     Comment: sober since    No EtOH    REVIEW OF SYSTEMS:  The comprehensive review of systems from the intake form was reviewed with the patient.  No fever, weight change or fatigue. No dry eyes. No oral ulcers, sore throat or voice change. No palpitations, syncope, angina or edema.  No chest pain, excessive  "sleepiness, shortness of breath or hemoptysis.   No abdominal pain, nausea, vomiting, diarrhea or heartburn.  No skin rash. No focal weakness or numbness. No bleeding or lymphadenopathy. No rhinitis or hives.     Exam:  On physical examination the patient appears the stated age, is in no acute distress, alert and oriented, affect is appropriate, and breathing is non-labored.  Vitals are documented in the EMR and have been reviewed:    Ht 1.803 m (5' 11\")   Wt 81.2 kg (179 lb)   BMI 24.97 kg/m    5' 11\"  Body mass index is 24.97 kg/m .    Rises from chair: slowly   Gait:stooped and uncomfortable  Trendelenburg test:  Gains the exam table: slowly   Uncomfortable lying flat     RIGHT hip subjective: sore/irriated   Abd: 20 sore   Add:  Flexion: 85  IRF:-5  ERF:30  Impingement test: + groin   Tenderness to palpation:++     LEFT hip symmetric with right     Distal the circulatory, motor, and sensation exam is intact with 5/5 EHL, gastroc-soleus, and tibialis anterior.  Sensation to light touch is intact.  Dorsalis pedis and posterior tibialis pulses are palpable.  There are no sores on the feet, no bruising, and no lymphedema.    Imaging:   Bilatearl Tonis 2-3 osteoarthritis       Again, thank you for allowing me to participate in the care of your patient.        Sincerely,        Royer Velez MD    Electronically signed"

## 2025-07-24 NOTE — NURSING NOTE
"Reason For Visit:   Chief Complaint   Patient presents with    Consult     Bilateral hip pain referral [facility]         1.803 m (5' 11\")   Wt 81.2 kg (179 lb)   BMI 24.97 kg/m      Pain Assessment  Patient Currently in Pain: Yes  Primary Pain Location: Hip (Bilateral - pain level is an 8)  Pain Descriptors: Sharp, Sore, Aching, Throbbing    Basilia Bailey CMA    "

## 2025-07-29 ENCOUNTER — TELEPHONE (OUTPATIENT)
Dept: ORTHOPEDICS | Facility: CLINIC | Age: 60
End: 2025-07-29
Payer: COMMERCIAL

## 2025-07-29 ENCOUNTER — HOSPITAL ENCOUNTER (OUTPATIENT)
Facility: CLINIC | Age: 60
End: 2025-07-29
Attending: ORTHOPAEDIC SURGERY | Admitting: ORTHOPAEDIC SURGERY
Payer: COMMERCIAL

## 2025-07-29 NOTE — TELEPHONE ENCOUNTER
Incoming call to schedule surgery with Dr. Velez    Spoke with: Ivan (patient)    Reason for Surgical Date: Patient preference    Date of Surgery: 8-25-25 (right) & 11-17-25(left)    Estimated Arrival time Discussed with Patient:  No    Location of surgery: [facility] OR     Pre-Op H&P: Randy    Imaging: No     Additional Pre-Op Appointments: No     Post-Op Appt Date w/ NP/PA:  Holli Mays PA-C  [place] [facility] [place]t Date w/ Surgeon  Dr. 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 I told him that we can schedule second side for 12 weeks post op, but Dr. Velez will give the final go ahead at 6 week post op appointment.       Viviana Talley on 7/29/2025 at 3:42 PM

## 2025-07-29 NOTE — TELEPHONE ENCOUNTER
General Call      Reason for Call:  Surgery Appt    What are your questions or concerns:  Pt would like to schedule surgery for his hip     Date of last appointment with provider:     Could we send this information to you in [place] or would you prefer to receive a phone call?:   Patient would prefer a phone call   Okay to leave a detailed message?: Yes at Cell number on file:    Telephone Information:   Mobile [phone]

## 2025-07-29 NOTE — TELEPHONE ENCOUNTER
Message left for the patient to call back to get surgery scheduled with Dr. Velez.     Viviana Talley  Surgery Scheduling Coordinator  Ph: [phone]

## 2025-08-05 ENCOUNTER — TELEPHONE (OUTPATIENT)
Dept: SLEEP MEDICINE | Facility: CLINIC | Age: 60
End: 2025-08-05

## (undated) DEVICE — CATH URETERAL OPEN END 6FR AXXCESS

## (undated) DEVICE — LASER FIBER HOLMIUM MOSES 200 D/F/L AC-10030100

## (undated) DEVICE — PACK CYSTOSCOPY SBA15CYFSI

## (undated) DEVICE — CUSTOM PACK TOTAL KNEE ACCESSORY SOP5BTAHEA

## (undated) DEVICE — WIRE GUIDE AMPLATZ SUPER STIFF 0.035"X145CM 46-524

## (undated) DEVICE — SHEATH URETERAL ACCESS NAVIGATOR HD 13/15FRX46CM M0062502290

## (undated) DEVICE — CATH URETERAL DUAL LUMEN 10FRX54CM M0064051000

## (undated) DEVICE — ENDO SEAL BX PORT BPS-A

## (undated) DEVICE — PUMP SYSTEM SINGLE ACTION M0067201000

## (undated) DEVICE — PLATE GROUNDING ADULT W/CORD 9165L

## (undated) DEVICE — SOL NACL 0.9% IRRIG 3000ML BAG 2B7477

## (undated) DEVICE — BLADE SAW SAGITTAL STRK DUAL CUT 4118-135-090

## (undated) DEVICE — BASKET NITINOL TIPLESS HALO  1.5FRX120CM 554120

## (undated) DEVICE — CATH FOLEY 18FR 30ML LATEX 0166SI18

## (undated) DEVICE — PAD CHUX UNDERPAD 23X24" 7136

## (undated) DEVICE — SOL WATER IRRIG 1000ML BOTTLE 2F7114

## (undated) DEVICE — GLOVE PROTEXIS MICRO 7.5  2D73PM75

## (undated) DEVICE — GLOVE PROTEXIS BLUE W/NEU-THERA 7.5  2D73EB75

## (undated) DEVICE — BAG DRAIN URO FOR SIEMENS 8MM ADAPTER NS CC164NS-A

## (undated) DEVICE — SU ETHILON 4-0 FS-2 18" 662H

## (undated) DEVICE — DRAPE SHEET REV FOLD 3/4 9349

## (undated) DEVICE — PACK HAND WRIST SOP15HWFSP

## (undated) DEVICE — DECANTER VIAL 2006S

## (undated) DEVICE — DRAPE STERI TOWEL LG 1010

## (undated) DEVICE — GUIDEWIRE AMPLATZ SUPER STIFF .035 X 145CM M0066401080

## (undated) DEVICE — GLOVE PROTEXIS W/NEU-THERA 8.0  2D73TE80

## (undated) DEVICE — PREP CHLORAPREP 26ML TINTED ORANGE  260815

## (undated) DEVICE — SUCTION MANIFOLD NEPTUNE 2 SYS 4 PORT 0702-020-000

## (undated) DEVICE — NDL 19GA 1.5"

## (undated) DEVICE — BNDG KLING 3" 2232

## (undated) DEVICE — GUIDEWIRE SENSOR DUAL FLEX STR 0.035"X150CM M0066703080

## (undated) DEVICE — LASER FIBER FLEXIVA PULSE ID TRACTIP 242 M006L8405960

## (undated) DEVICE — SU MONOCRYL 3-0 PS-2 18" UND MCP497G

## (undated) DEVICE — SOL WATER IRRIG 1000ML BOTTLE 07139-09

## (undated) DEVICE — RAD RX ISOVUE 300 (50ML) 61% IOPAMIDOL CHARGE PER ML

## (undated) DEVICE — GLOVE BIOGEL PI ULTRATOUCH G SZ 8.5 42185

## (undated) DEVICE — Device

## (undated) DEVICE — GLOVE BIOGEL PI INDICATOR 8.0 LF 41680

## (undated) DEVICE — CATH FOLEY COUDE 18FR 5ML LATEX

## (undated) DEVICE — GLOVE BIOGEL PI ULTRATOUCH G SZ 8.0 42180

## (undated) DEVICE — CAST PADDING 6" STERILE 9046S

## (undated) DEVICE — GLOVE UNDER INDICATOR PI SZ 8.5 LF 41685

## (undated) DEVICE — SUTURE VICRYL+ 2 27IN CP UNDYED VCP195H

## (undated) DEVICE — CUSTOM PACK TOTAL KNEE SOP5BTKHEC

## (undated) DEVICE — HOLDER LIMB VELCRO OR 0814-1533

## (undated) DEVICE — SU STRATAFIX PDS PLUS 1 CT-1 18" SXPP1A404

## (undated) DEVICE — SOL NACL 0.9% IRRIG 1000ML BOTTLE 2F7124

## (undated) DEVICE — GLOVE PROTEXIS W/NEU-THERA 7.5  2D73TE75

## (undated) DEVICE — A3 SUPPLIES- SEE NURSING INFO PAGE

## (undated) DEVICE — DRESSING MEPILEX AG SILVER 4X12 395990

## (undated) DEVICE — CATH TRAY URINE METER DRAIN BAG 2000ML ADD-A-FOLEY 399400A

## (undated) DEVICE — SHEATH URETERAL ACCESS NAVIGATOR HD 12/14FRX46CM M0062502260

## (undated) DEVICE — SUTURE VICRYL+ 1 27IN CT-1 UND VCP261H

## (undated) DEVICE — SOL NACL 0.9% INJ 1000ML BAG 2B1324X

## (undated) RX ORDER — ALBUMIN (HUMAN) 12.5 G/50ML
SOLUTION INTRAVENOUS
Status: DISPENSED
Start: 2019-12-02

## (undated) RX ORDER — PROPOFOL 10 MG/ML
INJECTION, EMULSION INTRAVENOUS
Status: DISPENSED
Start: 2023-04-24

## (undated) RX ORDER — HYDROMORPHONE HYDROCHLORIDE 1 MG/ML
INJECTION, SOLUTION INTRAMUSCULAR; INTRAVENOUS; SUBCUTANEOUS
Status: DISPENSED
Start: 2018-06-06

## (undated) RX ORDER — KETOROLAC TROMETHAMINE 30 MG/ML
INJECTION, SOLUTION INTRAMUSCULAR; INTRAVENOUS
Status: DISPENSED
Start: 2023-05-10

## (undated) RX ORDER — ONDANSETRON 2 MG/ML
INJECTION INTRAMUSCULAR; INTRAVENOUS
Status: DISPENSED
Start: 2023-05-10

## (undated) RX ORDER — DIPHENHYDRAMINE HYDROCHLORIDE 50 MG/ML
INJECTION INTRAMUSCULAR; INTRAVENOUS
Status: DISPENSED
Start: 2018-01-19

## (undated) RX ORDER — FENTANYL CITRATE 50 UG/ML
INJECTION, SOLUTION INTRAMUSCULAR; INTRAVENOUS
Status: DISPENSED
Start: 2020-11-25

## (undated) RX ORDER — FENTANYL CITRATE 50 UG/ML
INJECTION, SOLUTION INTRAMUSCULAR; INTRAVENOUS
Status: DISPENSED
Start: 2023-05-10

## (undated) RX ORDER — DEXAMETHASONE SODIUM PHOSPHATE 4 MG/ML
INJECTION, SOLUTION INTRA-ARTICULAR; INTRALESIONAL; INTRAMUSCULAR; INTRAVENOUS; SOFT TISSUE
Status: DISPENSED
Start: 2023-05-10

## (undated) RX ORDER — EPHEDRINE SULFATE 50 MG/ML
INJECTION, SOLUTION INTRAMUSCULAR; INTRAVENOUS; SUBCUTANEOUS
Status: DISPENSED
Start: 2018-05-14

## (undated) RX ORDER — FENTANYL CITRATE 0.05 MG/ML
INJECTION, SOLUTION INTRAMUSCULAR; INTRAVENOUS
Status: DISPENSED
Start: 2022-07-27

## (undated) RX ORDER — ALBUMIN (HUMAN) 12.5 G/50ML
SOLUTION INTRAVENOUS
Status: DISPENSED
Start: 2019-10-07

## (undated) RX ORDER — FENTANYL CITRATE 50 UG/ML
INJECTION, SOLUTION INTRAMUSCULAR; INTRAVENOUS
Status: DISPENSED
Start: 2022-07-27

## (undated) RX ORDER — FENTANYL CITRATE 50 UG/ML
INJECTION, SOLUTION INTRAMUSCULAR; INTRAVENOUS
Status: DISPENSED
Start: 2018-03-09

## (undated) RX ORDER — FENTANYL CITRATE 50 UG/ML
INJECTION, SOLUTION INTRAMUSCULAR; INTRAVENOUS
Status: DISPENSED
Start: 2018-06-06

## (undated) RX ORDER — ALBUMIN (HUMAN) 12.5 G/50ML
SOLUTION INTRAVENOUS
Status: DISPENSED
Start: 2019-04-17

## (undated) RX ORDER — FENTANYL CITRATE 50 UG/ML
INJECTION, SOLUTION INTRAMUSCULAR; INTRAVENOUS
Status: DISPENSED
Start: 2018-01-19

## (undated) RX ORDER — LIDOCAINE HYDROCHLORIDE 10 MG/ML
INJECTION, SOLUTION INFILTRATION; PERINEURAL
Status: DISPENSED
Start: 2018-02-07

## (undated) RX ORDER — FENTANYL CITRATE 50 UG/ML
INJECTION, SOLUTION INTRAMUSCULAR; INTRAVENOUS
Status: DISPENSED
Start: 2018-05-14

## (undated) RX ORDER — ALBUMIN (HUMAN) 12.5 G/50ML
SOLUTION INTRAVENOUS
Status: DISPENSED
Start: 2020-01-03

## (undated) RX ORDER — FENTANYL CITRATE 50 UG/ML
INJECTION, SOLUTION INTRAMUSCULAR; INTRAVENOUS
Status: DISPENSED
Start: 2022-03-30

## (undated) RX ORDER — DEXAMETHASONE SODIUM PHOSPHATE 4 MG/ML
INJECTION, SOLUTION INTRA-ARTICULAR; INTRALESIONAL; INTRAMUSCULAR; INTRAVENOUS; SOFT TISSUE
Status: DISPENSED
Start: 2023-04-24

## (undated) RX ORDER — ALBUMIN (HUMAN) 12.5 G/50ML
SOLUTION INTRAVENOUS
Status: DISPENSED
Start: 2022-03-02

## (undated) RX ORDER — PROPOFOL 10 MG/ML
INJECTION, EMULSION INTRAVENOUS
Status: DISPENSED
Start: 2018-05-14

## (undated) RX ORDER — ALBUMIN (HUMAN) 12.5 G/50ML
SOLUTION INTRAVENOUS
Status: DISPENSED
Start: 2022-01-19

## (undated) RX ORDER — HEPARIN SODIUM 1000 [USP'U]/ML
INJECTION, SOLUTION INTRAVENOUS; SUBCUTANEOUS
Status: DISPENSED
Start: 2022-01-28

## (undated) RX ORDER — PHENYLEPHRINE HCL IN 0.9% NACL 1 MG/10 ML
SYRINGE (ML) INTRAVENOUS
Status: DISPENSED
Start: 2018-05-14

## (undated) RX ORDER — PROPOFOL 10 MG/ML
INJECTION, EMULSION INTRAVENOUS
Status: DISPENSED
Start: 2020-06-11

## (undated) RX ORDER — FUROSEMIDE 10 MG/ML
INJECTION INTRAMUSCULAR; INTRAVENOUS
Status: DISPENSED
Start: 2023-05-10

## (undated) RX ORDER — HEPARIN SODIUM 200 [USP'U]/100ML
INJECTION, SOLUTION INTRAVENOUS
Status: DISPENSED
Start: 2021-08-11

## (undated) RX ORDER — ALBUMIN (HUMAN) 12.5 G/50ML
SOLUTION INTRAVENOUS
Status: DISPENSED
Start: 2019-05-02

## (undated) RX ORDER — FENTANYL CITRATE 50 UG/ML
INJECTION, SOLUTION INTRAMUSCULAR; INTRAVENOUS
Status: DISPENSED
Start: 2021-08-11

## (undated) RX ORDER — DIPHENHYDRAMINE HYDROCHLORIDE 50 MG/ML
INJECTION INTRAMUSCULAR; INTRAVENOUS
Status: DISPENSED
Start: 2017-12-08

## (undated) RX ORDER — LIDOCAINE HYDROCHLORIDE 10 MG/ML
INJECTION, SOLUTION EPIDURAL; INFILTRATION; INTRACAUDAL; PERINEURAL
Status: DISPENSED
Start: 2019-05-02

## (undated) RX ORDER — LIDOCAINE HYDROCHLORIDE 10 MG/ML
INJECTION, SOLUTION EPIDURAL; INFILTRATION; INTRACAUDAL; PERINEURAL
Status: DISPENSED
Start: 2020-11-25

## (undated) RX ORDER — AMPICILLIN AND SULBACTAM 2; 1 G/1; G/1
INJECTION, POWDER, FOR SOLUTION INTRAMUSCULAR; INTRAVENOUS
Status: DISPENSED
Start: 2018-06-06

## (undated) RX ORDER — LIDOCAINE HYDROCHLORIDE 10 MG/ML
INJECTION, SOLUTION EPIDURAL; INFILTRATION; INTRACAUDAL; PERINEURAL
Status: DISPENSED
Start: 2022-01-19

## (undated) RX ORDER — SIMETHICONE 20 MG/.3ML
EMULSION ORAL
Status: DISPENSED
Start: 2018-03-09

## (undated) RX ORDER — LIDOCAINE HYDROCHLORIDE 10 MG/ML
INJECTION, SOLUTION EPIDURAL; INFILTRATION; INTRACAUDAL; PERINEURAL
Status: DISPENSED
Start: 2020-06-11

## (undated) RX ORDER — FENTANYL CITRATE 50 UG/ML
INJECTION, SOLUTION INTRAMUSCULAR; INTRAVENOUS
Status: DISPENSED
Start: 2023-09-19

## (undated) RX ORDER — FENTANYL CITRATE 50 UG/ML
INJECTION, SOLUTION INTRAMUSCULAR; INTRAVENOUS
Status: DISPENSED
Start: 2023-04-24

## (undated) RX ORDER — LIDOCAINE HYDROCHLORIDE 10 MG/ML
INJECTION, SOLUTION EPIDURAL; INFILTRATION; INTRACAUDAL; PERINEURAL
Status: DISPENSED
Start: 2018-05-14

## (undated) RX ORDER — ACETAMINOPHEN 325 MG/1
TABLET ORAL
Status: DISPENSED
Start: 2018-06-06

## (undated) RX ORDER — SIMETHICONE 20 MG/.3ML
EMULSION ORAL
Status: DISPENSED
Start: 2018-01-19

## (undated) RX ORDER — FENTANYL CITRATE 50 UG/ML
INJECTION, SOLUTION INTRAMUSCULAR; INTRAVENOUS
Status: DISPENSED
Start: 2019-10-29

## (undated) RX ORDER — IBUPROFEN 200 MG
TABLET ORAL
Status: DISPENSED
Start: 2020-11-25

## (undated) RX ORDER — ACETAMINOPHEN 325 MG/1
TABLET ORAL
Status: DISPENSED
Start: 2018-05-14

## (undated) RX ORDER — LIDOCAINE HYDROCHLORIDE 10 MG/ML
INJECTION, SOLUTION EPIDURAL; INFILTRATION; INTRACAUDAL; PERINEURAL
Status: DISPENSED
Start: 2019-04-17

## (undated) RX ORDER — PROPOFOL 10 MG/ML
INJECTION, EMULSION INTRAVENOUS
Status: DISPENSED
Start: 2023-09-19

## (undated) RX ORDER — ALBUMIN (HUMAN) 12.5 G/50ML
SOLUTION INTRAVENOUS
Status: DISPENSED
Start: 2022-03-16

## (undated) RX ORDER — ALBUMIN (HUMAN) 12.5 G/50ML
SOLUTION INTRAVENOUS
Status: DISPENSED
Start: 2020-02-06

## (undated) RX ORDER — OXYCODONE HYDROCHLORIDE 5 MG/1
TABLET ORAL
Status: DISPENSED
Start: 2018-06-06

## (undated) RX ORDER — OXYCODONE HYDROCHLORIDE 5 MG/1
TABLET ORAL
Status: DISPENSED
Start: 2023-05-10

## (undated) RX ORDER — LIDOCAINE HYDROCHLORIDE 10 MG/ML
INJECTION, SOLUTION EPIDURAL; INFILTRATION; INTRACAUDAL; PERINEURAL
Status: DISPENSED
Start: 2021-08-11

## (undated) RX ORDER — CEFAZOLIN SODIUM/WATER 2 G/20 ML
SYRINGE (ML) INTRAVENOUS
Status: DISPENSED
Start: 2022-07-27

## (undated) RX ORDER — DEXAMETHASONE SODIUM PHOSPHATE 10 MG/ML
INJECTION, EMULSION INTRAMUSCULAR; INTRAVENOUS
Status: DISPENSED
Start: 2023-09-19

## (undated) RX ORDER — FENTANYL CITRATE 50 UG/ML
INJECTION, SOLUTION INTRAMUSCULAR; INTRAVENOUS
Status: DISPENSED
Start: 2022-01-28

## (undated) RX ORDER — ALBUMIN (HUMAN) 12.5 G/50ML
SOLUTION INTRAVENOUS
Status: DISPENSED
Start: 2020-01-23

## (undated) RX ORDER — LIDOCAINE HYDROCHLORIDE 10 MG/ML
INJECTION, SOLUTION EPIDURAL; INFILTRATION; INTRACAUDAL; PERINEURAL
Status: DISPENSED
Start: 2022-03-02

## (undated) RX ORDER — TRAMADOL HYDROCHLORIDE 50 MG/1
TABLET ORAL
Status: DISPENSED
Start: 2023-04-24

## (undated) RX ORDER — LIDOCAINE HYDROCHLORIDE 10 MG/ML
INJECTION, SOLUTION EPIDURAL; INFILTRATION; INTRACAUDAL; PERINEURAL
Status: DISPENSED
Start: 2020-01-23

## (undated) RX ORDER — LIDOCAINE HYDROCHLORIDE 10 MG/ML
INJECTION, SOLUTION EPIDURAL; INFILTRATION; INTRACAUDAL; PERINEURAL
Status: DISPENSED
Start: 2022-03-30

## (undated) RX ORDER — BUPIVACAINE HYDROCHLORIDE 2.5 MG/ML
INJECTION, SOLUTION EPIDURAL; INFILTRATION; INTRACAUDAL
Status: DISPENSED
Start: 2020-06-11

## (undated) RX ORDER — PROPOFOL 10 MG/ML
INJECTION, EMULSION INTRAVENOUS
Status: DISPENSED
Start: 2018-06-06

## (undated) RX ORDER — LIDOCAINE HYDROCHLORIDE 20 MG/ML
INJECTION, SOLUTION EPIDURAL; INFILTRATION; INTRACAUDAL; PERINEURAL
Status: DISPENSED
Start: 2018-05-14

## (undated) RX ORDER — PROPOFOL 10 MG/ML
INJECTION, EMULSION INTRAVENOUS
Status: DISPENSED
Start: 2023-05-10

## (undated) RX ORDER — ALBUMIN (HUMAN) 12.5 G/50ML
SOLUTION INTRAVENOUS
Status: DISPENSED
Start: 2021-12-27

## (undated) RX ORDER — FENTANYL CITRATE 50 UG/ML
INJECTION, SOLUTION INTRAMUSCULAR; INTRAVENOUS
Status: DISPENSED
Start: 2020-06-11

## (undated) RX ORDER — CEFAZOLIN SODIUM/WATER 2 G/20 ML
SYRINGE (ML) INTRAVENOUS
Status: DISPENSED
Start: 2023-04-24

## (undated) RX ORDER — SODIUM CHLORIDE 9 MG/ML
INJECTION, SOLUTION INTRAVENOUS
Status: DISPENSED
Start: 2022-03-30

## (undated) RX ORDER — CEFAZOLIN SODIUM 1 G/3ML
INJECTION, POWDER, FOR SOLUTION INTRAMUSCULAR; INTRAVENOUS
Status: DISPENSED
Start: 2020-06-11

## (undated) RX ORDER — SODIUM CHLORIDE 9 MG/ML
INJECTION, SOLUTION INTRAVENOUS
Status: DISPENSED
Start: 2020-11-25

## (undated) RX ORDER — ALBUMIN (HUMAN) 12.5 G/50ML
SOLUTION INTRAVENOUS
Status: DISPENSED
Start: 2019-05-16

## (undated) RX ORDER — ONDANSETRON 2 MG/ML
INJECTION INTRAMUSCULAR; INTRAVENOUS
Status: DISPENSED
Start: 2023-04-24

## (undated) RX ORDER — LIDOCAINE HYDROCHLORIDE 10 MG/ML
INJECTION, SOLUTION EPIDURAL; INFILTRATION; INTRACAUDAL; PERINEURAL
Status: DISPENSED
Start: 2019-05-31

## (undated) RX ORDER — SODIUM CHLORIDE 9 MG/ML
INJECTION, SOLUTION INTRAVENOUS
Status: DISPENSED
Start: 2019-10-29

## (undated) RX ORDER — AMPICILLIN AND SULBACTAM 2; 1 G/1; G/1
INJECTION, POWDER, FOR SOLUTION INTRAMUSCULAR; INTRAVENOUS
Status: DISPENSED
Start: 2018-05-14

## (undated) RX ORDER — FUROSEMIDE 10 MG/ML
INJECTION INTRAMUSCULAR; INTRAVENOUS
Status: DISPENSED
Start: 2022-07-27

## (undated) RX ORDER — ALBUMIN (HUMAN) 12.5 G/50ML
SOLUTION INTRAVENOUS
Status: DISPENSED
Start: 2019-05-31

## (undated) RX ORDER — FENTANYL CITRATE 50 UG/ML
INJECTION, SOLUTION INTRAMUSCULAR; INTRAVENOUS
Status: DISPENSED
Start: 2017-12-08

## (undated) RX ORDER — FENTANYL CITRATE 50 UG/ML
INJECTION, SOLUTION INTRAMUSCULAR; INTRAVENOUS
Status: DISPENSED
Start: 2018-04-13

## (undated) RX ORDER — ALBUMIN (HUMAN) 12.5 G/50ML
SOLUTION INTRAVENOUS
Status: DISPENSED
Start: 2018-02-07

## (undated) RX ORDER — ONDANSETRON 2 MG/ML
INJECTION INTRAMUSCULAR; INTRAVENOUS
Status: DISPENSED
Start: 2018-05-14

## (undated) RX ORDER — FENTANYL CITRATE 50 UG/ML
INJECTION, SOLUTION INTRAMUSCULAR; INTRAVENOUS
Status: DISPENSED
Start: 2019-06-07

## (undated) RX ORDER — AMPICILLIN AND SULBACTAM 2; 1 G/1; G/1
INJECTION, POWDER, FOR SOLUTION INTRAMUSCULAR; INTRAVENOUS
Status: DISPENSED
Start: 2020-11-25

## (undated) RX ORDER — LIDOCAINE HYDROCHLORIDE 10 MG/ML
INJECTION, SOLUTION EPIDURAL; INFILTRATION; INTRACAUDAL; PERINEURAL
Status: DISPENSED
Start: 2019-05-16

## (undated) RX ORDER — ONDANSETRON 2 MG/ML
INJECTION INTRAMUSCULAR; INTRAVENOUS
Status: DISPENSED
Start: 2018-06-06

## (undated) RX ORDER — LIDOCAINE HYDROCHLORIDE 10 MG/ML
INJECTION, SOLUTION EPIDURAL; INFILTRATION; INTRACAUDAL; PERINEURAL
Status: DISPENSED
Start: 2020-01-03

## (undated) RX ORDER — LIDOCAINE HYDROCHLORIDE 10 MG/ML
INJECTION, SOLUTION EPIDURAL; INFILTRATION; INTRACAUDAL; PERINEURAL
Status: DISPENSED
Start: 2019-10-29

## (undated) RX ORDER — LIDOCAINE HYDROCHLORIDE 10 MG/ML
INJECTION, SOLUTION EPIDURAL; INFILTRATION; INTRACAUDAL; PERINEURAL
Status: DISPENSED
Start: 2022-01-03

## (undated) RX ORDER — DIPHENHYDRAMINE HYDROCHLORIDE 50 MG/ML
INJECTION INTRAMUSCULAR; INTRAVENOUS
Status: DISPENSED
Start: 2019-06-07

## (undated) RX ORDER — GABAPENTIN 300 MG/1
CAPSULE ORAL
Status: DISPENSED
Start: 2020-06-11

## (undated) RX ORDER — FENTANYL CITRATE 50 UG/ML
INJECTION, SOLUTION INTRAMUSCULAR; INTRAVENOUS
Status: DISPENSED
Start: 2017-10-31

## (undated) RX ORDER — EPHEDRINE SULFATE 50 MG/ML
INJECTION, SOLUTION INTRAMUSCULAR; INTRAVENOUS; SUBCUTANEOUS
Status: DISPENSED
Start: 2023-09-19

## (undated) RX ORDER — SODIUM CHLORIDE 9 MG/ML
INJECTION, SOLUTION INTRAVENOUS
Status: DISPENSED
Start: 2022-01-28

## (undated) RX ORDER — ALBUMIN, HUMAN INJ 5% 5 %
SOLUTION INTRAVENOUS
Status: DISPENSED
Start: 2018-06-06

## (undated) RX ORDER — ONDANSETRON 2 MG/ML
INJECTION INTRAMUSCULAR; INTRAVENOUS
Status: DISPENSED
Start: 2023-09-19

## (undated) RX ORDER — ALBUMIN (HUMAN) 12.5 G/50ML
SOLUTION INTRAVENOUS
Status: DISPENSED
Start: 2022-02-18

## (undated) RX ORDER — PROPOFOL 10 MG/ML
INJECTION, EMULSION INTRAVENOUS
Status: DISPENSED
Start: 2022-07-01

## (undated) RX ORDER — ACETAMINOPHEN 325 MG/1
TABLET ORAL
Status: DISPENSED
Start: 2020-06-11

## (undated) RX ORDER — LIDOCAINE HYDROCHLORIDE 10 MG/ML
INJECTION, SOLUTION EPIDURAL; INFILTRATION; INTRACAUDAL; PERINEURAL
Status: DISPENSED
Start: 2019-10-07

## (undated) RX ORDER — FENTANYL CITRATE 50 UG/ML
INJECTION, SOLUTION INTRAMUSCULAR; INTRAVENOUS
Status: DISPENSED
Start: 2022-07-01

## (undated) RX ORDER — LIDOCAINE HYDROCHLORIDE 10 MG/ML
INJECTION, SOLUTION EPIDURAL; INFILTRATION; INTRACAUDAL; PERINEURAL
Status: DISPENSED
Start: 2020-02-06

## (undated) RX ORDER — LIDOCAINE HYDROCHLORIDE 10 MG/ML
INJECTION, SOLUTION EPIDURAL; INFILTRATION; INTRACAUDAL; PERINEURAL
Status: DISPENSED
Start: 2021-12-27

## (undated) RX ORDER — LIDOCAINE HYDROCHLORIDE 10 MG/ML
INJECTION, SOLUTION EPIDURAL; INFILTRATION; INTRACAUDAL; PERINEURAL
Status: DISPENSED
Start: 2022-02-18

## (undated) RX ORDER — FUROSEMIDE 10 MG/ML
INJECTION INTRAMUSCULAR; INTRAVENOUS
Status: DISPENSED
Start: 2023-04-24

## (undated) RX ORDER — GLYCOPYRROLATE 0.2 MG/ML
INJECTION, SOLUTION INTRAMUSCULAR; INTRAVENOUS
Status: DISPENSED
Start: 2018-05-14

## (undated) RX ORDER — SODIUM CHLORIDE 9 MG/ML
INJECTION, SOLUTION INTRAVENOUS
Status: DISPENSED
Start: 2021-08-11

## (undated) RX ORDER — LIDOCAINE HYDROCHLORIDE 10 MG/ML
INJECTION, SOLUTION EPIDURAL; INFILTRATION; INTRACAUDAL; PERINEURAL
Status: DISPENSED
Start: 2022-01-28

## (undated) RX ORDER — LIDOCAINE 40 MG/G
CREAM TOPICAL
Status: DISPENSED
Start: 2021-08-11

## (undated) RX ORDER — LIDOCAINE HYDROCHLORIDE 10 MG/ML
INJECTION, SOLUTION EPIDURAL; INFILTRATION; INTRACAUDAL; PERINEURAL
Status: DISPENSED
Start: 2019-12-02

## (undated) RX ORDER — LIDOCAINE HYDROCHLORIDE 10 MG/ML
INJECTION, SOLUTION EPIDURAL; INFILTRATION; INTRACAUDAL; PERINEURAL
Status: DISPENSED
Start: 2022-03-16

## (undated) RX ORDER — LIDOCAINE HYDROCHLORIDE 10 MG/ML
INJECTION, SOLUTION EPIDURAL; INFILTRATION; INTRACAUDAL; PERINEURAL
Status: DISPENSED
Start: 2018-06-06

## (undated) RX ORDER — KETOROLAC TROMETHAMINE 30 MG/ML
INJECTION, SOLUTION INTRAMUSCULAR; INTRAVENOUS
Status: DISPENSED
Start: 2023-04-24